# Patient Record
Sex: FEMALE | Race: WHITE | URBAN - METROPOLITAN AREA
[De-identification: names, ages, dates, MRNs, and addresses within clinical notes are randomized per-mention and may not be internally consistent; named-entity substitution may affect disease eponyms.]

---

## 2017-07-20 ENCOUNTER — EMERGENCY (EMERGENCY)
Facility: HOSPITAL | Age: 57
LOS: 1 days | End: 2017-07-20
Payer: COMMERCIAL

## 2017-07-20 PROCEDURE — 99283 EMERGENCY DEPT VISIT LOW MDM: CPT | Mod: 25

## 2021-07-07 ENCOUNTER — APPOINTMENT (OUTPATIENT)
Dept: HEART AND VASCULAR | Facility: CLINIC | Age: 61
End: 2021-07-07
Payer: COMMERCIAL

## 2021-07-07 VITALS
HEIGHT: 68 IN | SYSTOLIC BLOOD PRESSURE: 130 MMHG | WEIGHT: 287.5 LBS | HEART RATE: 80 BPM | BODY MASS INDEX: 43.57 KG/M2 | DIASTOLIC BLOOD PRESSURE: 74 MMHG

## 2021-07-07 DIAGNOSIS — Z78.9 OTHER SPECIFIED HEALTH STATUS: ICD-10-CM

## 2021-07-07 DIAGNOSIS — R00.2 PALPITATIONS: ICD-10-CM

## 2021-07-07 DIAGNOSIS — Z80.0 FAMILY HISTORY OF MALIGNANT NEOPLASM OF DIGESTIVE ORGANS: ICD-10-CM

## 2021-07-07 DIAGNOSIS — Z80.7 FAMILY HISTORY OF OTHER MALIGNANT NEOPLASMS OF LYMPHOID, HEMATOPOIETIC AND RELATED TISSUES: ICD-10-CM

## 2021-07-07 DIAGNOSIS — E78.00 PURE HYPERCHOLESTEROLEMIA, UNSPECIFIED: ICD-10-CM

## 2021-07-07 PROCEDURE — 93000 ELECTROCARDIOGRAM COMPLETE: CPT

## 2021-07-07 PROCEDURE — 99072 ADDL SUPL MATRL&STAF TM PHE: CPT

## 2021-07-07 PROCEDURE — 99204 OFFICE O/P NEW MOD 45 MIN: CPT | Mod: 25

## 2021-07-07 RX ORDER — ESOMEPRAZOLE MAGNESIUM 20 MG/1
20 CAPSULE, DELAYED RELEASE ORAL
Refills: 0 | Status: ACTIVE | COMMUNITY

## 2021-07-07 NOTE — HISTORY OF PRESENT ILLNESS
[FreeTextEntry1] : has chronic heavy legs- and developed bilateral leg discomfort- saw vascular in [place] who DX with venous Abnormaility (RLE "totally closed" and advised support stockings- has been having almost daily, short palpitations- legs get more swollen when walks a lot. gets OOB with minimal movement and bending down( chronic). . no cp,dizziness. no significant caffeine intake

## 2021-07-07 NOTE — DISCUSSION/SUMMARY
[FreeTextEntry1] : EKG:NSR\par reveiwed labs done last month: DXB=213wt%\par elevated A1c- weight loss/diet for lipids; will get echo for dyspnea and stress test( feel due to weight)- get event recorder for palpitations

## 2021-07-07 NOTE — PHYSICAL EXAM
[Well Developed] : well developed [Well Nourished] : well nourished [No Acute Distress] : no acute distress [Obese] : obese [Normal Conjunctiva] : normal conjunctiva [Normal Venous Pressure] : normal venous pressure [No Carotid Bruit] : no carotid bruit [Normal S1, S2] : normal S1, S2 [No Murmur] : no murmur [No Rub] : no rub [No Gallop] : no gallop [Clear Lung Fields] : clear lung fields [Good Air Entry] : good air entry [No Respiratory Distress] : no respiratory distress  [Soft] : abdomen soft [Non Tender] : non-tender [Normal Bowel Sounds] : normal bowel sounds [Normal Gait] : normal gait [No Varicosities] : no varicosities [No Rash] : no rash [No Skin Lesions] : no skin lesions [Moves all extremities] : moves all extremities [Normal Speech] : normal speech [Alert and Oriented] : alert and oriented [de-identified] : + LE varicosities: non pitting lE edema

## 2021-07-07 NOTE — REVIEW OF SYSTEMS
[Weight Loss (___ Lbs)] : [unfilled] ~Ulb weight loss [Tinnitus] : tinnitus [Vertigo] : vertigo [SOB] : shortness of breath [Dyspnea on exertion] : dyspnea during exertion [Chest Discomfort] : no chest discomfort [Lower Ext Edema] : lower extremity edema [Leg Claudication] : no intermittent leg claudication [Palpitations] : palpitations [Orthopnea] : no orthopnea [PND] : no PND [Syncope] : no syncope [Cough] : cough [Wheezing] : no wheezing [Coughing Up Blood] : no hemoptysis [Snoring] : no snoring [Abdominal Pain] : no abdominal pain [Nausea] : no nausea [Vomiting] : no vomiting [Heartburn] : heartburn [Change in Appetite] : no change in appetite [Change In The Stool] : no change in stool [Diarrhea] : diarrhea [Constipation] : no constipation [Blood in Stool] : no blood in stool [Dysuria] : no dysuria [Joint Pain] : joint pain [Myalgia] : no myalgia [Knee Pain] : knee pain [Ankle Pain] : ankle pain [Dizziness] : dizziness [Memory Lapses Or Loss] : memory lapses or loss [Depression] : no depression [Anxiety] : anxiety [Under Stress] : under stress [Negative] : Heme/Lymph [FreeTextEntry4] : positional [FreeTextEntry2] : positional vertigo

## 2021-07-30 ENCOUNTER — APPOINTMENT (OUTPATIENT)
Dept: HEART AND VASCULAR | Facility: CLINIC | Age: 61
End: 2021-07-30
Payer: COMMERCIAL

## 2021-07-30 DIAGNOSIS — I51.7 CARDIOMEGALY: ICD-10-CM

## 2021-07-30 PROCEDURE — 93306 TTE W/DOPPLER COMPLETE: CPT

## 2023-01-19 ENCOUNTER — APPOINTMENT (RX ONLY)
Dept: URBAN - METROPOLITAN AREA CLINIC 146 | Facility: CLINIC | Age: 63
Setting detail: DERMATOLOGY
End: 2023-01-19

## 2023-01-19 DIAGNOSIS — L28.1 PRURIGO NODULARIS: ICD-10-CM

## 2023-01-19 DIAGNOSIS — L29.89 OTHER PRURITUS: ICD-10-CM | Status: INADEQUATELY CONTROLLED

## 2023-01-19 PROBLEM — L29.8 OTHER PRURITUS: Status: ACTIVE | Noted: 2023-01-19

## 2023-01-19 PROCEDURE — ? PRESCRIPTION

## 2023-01-19 PROCEDURE — 99204 OFFICE O/P NEW MOD 45 MIN: CPT

## 2023-01-19 PROCEDURE — ? COUNSELING

## 2023-01-19 PROCEDURE — ? PRESCRIPTION MEDICATION MANAGEMENT

## 2023-01-19 RX ORDER — GABAPENTIN 100 MG/1
CAPSULE ORAL
Qty: 60 | Refills: 5 | Status: ERX | COMMUNITY
Start: 2023-01-19

## 2023-01-19 RX ADMIN — GABAPENTIN: 100 CAPSULE ORAL at 00:00

## 2023-01-19 ASSESSMENT — LOCATION DETAILED DESCRIPTION DERM
LOCATION DETAILED: RIGHT ANTERIOR PROXIMAL UPPER ARM
LOCATION DETAILED: RIGHT VENTRAL DISTAL FOREARM
LOCATION DETAILED: LEFT VENTRAL DISTAL FOREARM
LOCATION DETAILED: LEFT VENTRAL PROXIMAL FOREARM
LOCATION DETAILED: LEFT ANTERIOR PROXIMAL UPPER ARM

## 2023-01-19 ASSESSMENT — LOCATION SIMPLE DESCRIPTION DERM
LOCATION SIMPLE: RIGHT UPPER ARM
LOCATION SIMPLE: LEFT FOREARM
LOCATION SIMPLE: LEFT UPPER ARM
LOCATION SIMPLE: RIGHT FOREARM

## 2023-01-19 ASSESSMENT — LOCATION ZONE DERM: LOCATION ZONE: ARM

## 2023-08-26 ENCOUNTER — INPATIENT (INPATIENT)
Facility: HOSPITAL | Age: 63
LOS: 17 days | Discharge: ANOTHER HOSPITAL | DRG: 3 | End: 2023-09-13
Attending: INTERNAL MEDICINE | Admitting: STUDENT IN AN ORGANIZED HEALTH CARE EDUCATION/TRAINING PROGRAM
Payer: COMMERCIAL

## 2023-08-26 VITALS
OXYGEN SATURATION: 94 % | HEIGHT: 68 IN | RESPIRATION RATE: 26 BRPM | HEART RATE: 108 BPM | WEIGHT: 289.03 LBS | SYSTOLIC BLOOD PRESSURE: 156 MMHG | DIASTOLIC BLOOD PRESSURE: 88 MMHG | TEMPERATURE: 98 F

## 2023-08-26 DIAGNOSIS — R06.09 OTHER FORMS OF DYSPNEA: ICD-10-CM

## 2023-08-26 DIAGNOSIS — I48.91 UNSPECIFIED ATRIAL FIBRILLATION: ICD-10-CM

## 2023-08-26 DIAGNOSIS — I24.8 OTHER FORMS OF ACUTE ISCHEMIC HEART DISEASE: ICD-10-CM

## 2023-08-26 DIAGNOSIS — Z29.9 ENCOUNTER FOR PROPHYLACTIC MEASURES, UNSPECIFIED: ICD-10-CM

## 2023-08-26 DIAGNOSIS — R91.8 OTHER NONSPECIFIC ABNORMAL FINDING OF LUNG FIELD: ICD-10-CM

## 2023-08-26 DIAGNOSIS — M54.30 SCIATICA, UNSPECIFIED SIDE: ICD-10-CM

## 2023-08-26 DIAGNOSIS — R77.8 OTHER SPECIFIED ABNORMALITIES OF PLASMA PROTEINS: ICD-10-CM

## 2023-08-26 DIAGNOSIS — R74.01 ELEVATION OF LEVELS OF LIVER TRANSAMINASE LEVELS: ICD-10-CM

## 2023-08-26 LAB
ALBUMIN SERPL ELPH-MCNC: 3.4 G/DL — SIGNIFICANT CHANGE UP (ref 3.3–5)
ALP SERPL-CCNC: 68 U/L — SIGNIFICANT CHANGE UP (ref 40–120)
ALT FLD-CCNC: 71 U/L — HIGH (ref 10–45)
ANION GAP SERPL CALC-SCNC: 10 MMOL/L — SIGNIFICANT CHANGE UP (ref 5–17)
APTT BLD: 28.8 SEC — SIGNIFICANT CHANGE UP (ref 24.5–35.6)
AST SERPL-CCNC: 58 U/L — HIGH (ref 10–40)
BASE EXCESS BLDV CALC-SCNC: 2.7 MMOL/L — SIGNIFICANT CHANGE UP (ref -2–3)
BASOPHILS # BLD AUTO: 0.03 K/UL — SIGNIFICANT CHANGE UP (ref 0–0.2)
BASOPHILS NFR BLD AUTO: 0.3 % — SIGNIFICANT CHANGE UP (ref 0–2)
BILIRUB SERPL-MCNC: 1.3 MG/DL — HIGH (ref 0.2–1.2)
BUN SERPL-MCNC: 15 MG/DL — SIGNIFICANT CHANGE UP (ref 7–23)
CALCIUM SERPL-MCNC: 9.3 MG/DL — SIGNIFICANT CHANGE UP (ref 8.4–10.5)
CHLORIDE SERPL-SCNC: 100 MMOL/L — SIGNIFICANT CHANGE UP (ref 96–108)
CO2 BLDV-SCNC: 29.3 MMOL/L — HIGH (ref 22–26)
CO2 SERPL-SCNC: 25 MMOL/L — SIGNIFICANT CHANGE UP (ref 22–31)
CREAT SERPL-MCNC: 0.84 MG/DL — SIGNIFICANT CHANGE UP (ref 0.5–1.3)
D DIMER BLD IA.RAPID-MCNC: 666 NG/ML DDU — HIGH
EGFR: 78 ML/MIN/1.73M2 — SIGNIFICANT CHANGE UP
EOSINOPHIL # BLD AUTO: 0.01 K/UL — SIGNIFICANT CHANGE UP (ref 0–0.5)
EOSINOPHIL NFR BLD AUTO: 0.1 % — SIGNIFICANT CHANGE UP (ref 0–6)
GLUCOSE SERPL-MCNC: 119 MG/DL — HIGH (ref 70–99)
HCO3 BLDV-SCNC: 28 MMOL/L — SIGNIFICANT CHANGE UP (ref 22–29)
HCT VFR BLD CALC: 46.1 % — HIGH (ref 34.5–45)
HGB BLD-MCNC: 15.4 G/DL — SIGNIFICANT CHANGE UP (ref 11.5–15.5)
IMM GRANULOCYTES NFR BLD AUTO: 1.2 % — HIGH (ref 0–0.9)
INR BLD: 1.1 — SIGNIFICANT CHANGE UP (ref 0.85–1.18)
LYMPHOCYTES # BLD AUTO: 0.86 K/UL — LOW (ref 1–3.3)
LYMPHOCYTES # BLD AUTO: 8.7 % — LOW (ref 13–44)
MCHC RBC-ENTMCNC: 31.1 PG — SIGNIFICANT CHANGE UP (ref 27–34)
MCHC RBC-ENTMCNC: 33.4 GM/DL — SIGNIFICANT CHANGE UP (ref 32–36)
MCV RBC AUTO: 93.1 FL — SIGNIFICANT CHANGE UP (ref 80–100)
MONOCYTES # BLD AUTO: 0.88 K/UL — SIGNIFICANT CHANGE UP (ref 0–0.9)
MONOCYTES NFR BLD AUTO: 9 % — SIGNIFICANT CHANGE UP (ref 2–14)
NEUTROPHILS # BLD AUTO: 7.93 K/UL — HIGH (ref 1.8–7.4)
NEUTROPHILS NFR BLD AUTO: 80.7 % — HIGH (ref 43–77)
NRBC # BLD: 0 /100 WBCS — SIGNIFICANT CHANGE UP (ref 0–0)
NT-PROBNP SERPL-SCNC: 160 PG/ML — SIGNIFICANT CHANGE UP (ref 0–300)
PCO2 BLDV: 44 MMHG — HIGH (ref 39–42)
PH BLDV: 7.41 — SIGNIFICANT CHANGE UP (ref 7.32–7.43)
PLATELET # BLD AUTO: 222 K/UL — SIGNIFICANT CHANGE UP (ref 150–400)
PO2 BLDV: <33 MMHG — SIGNIFICANT CHANGE UP (ref 25–45)
POTASSIUM SERPL-MCNC: 4.2 MMOL/L — SIGNIFICANT CHANGE UP (ref 3.5–5.3)
POTASSIUM SERPL-SCNC: 4.2 MMOL/L — SIGNIFICANT CHANGE UP (ref 3.5–5.3)
PROT SERPL-MCNC: 7.6 G/DL — SIGNIFICANT CHANGE UP (ref 6–8.3)
PROTHROM AB SERPL-ACNC: 12.5 SEC — SIGNIFICANT CHANGE UP (ref 9.5–13)
RAPID RVP RESULT: SIGNIFICANT CHANGE UP
RBC # BLD: 4.95 M/UL — SIGNIFICANT CHANGE UP (ref 3.8–5.2)
RBC # FLD: 14.9 % — HIGH (ref 10.3–14.5)
SAO2 % BLDV: 47.1 % — LOW (ref 67–88)
SARS-COV-2 RNA SPEC QL NAA+PROBE: SIGNIFICANT CHANGE UP
SODIUM SERPL-SCNC: 135 MMOL/L — SIGNIFICANT CHANGE UP (ref 135–145)
TROPONIN T, HIGH SENSITIVITY RESULT: 118 NG/L — CRITICAL HIGH (ref 0–51)
TROPONIN T, HIGH SENSITIVITY RESULT: 123 NG/L — CRITICAL HIGH (ref 0–51)
WBC # BLD: 9.83 K/UL — SIGNIFICANT CHANGE UP (ref 3.8–10.5)
WBC # FLD AUTO: 9.83 K/UL — SIGNIFICANT CHANGE UP (ref 3.8–10.5)

## 2023-08-26 PROCEDURE — 71275 CT ANGIOGRAPHY CHEST: CPT | Mod: 26,MC

## 2023-08-26 PROCEDURE — 99223 1ST HOSP IP/OBS HIGH 75: CPT

## 2023-08-26 PROCEDURE — 99291 CRITICAL CARE FIRST HOUR: CPT

## 2023-08-26 RX ORDER — SODIUM CHLORIDE 9 MG/ML
1000 INJECTION, SOLUTION INTRAVENOUS
Refills: 0 | Status: DISCONTINUED | OUTPATIENT
Start: 2023-08-26 | End: 2023-09-13

## 2023-08-26 RX ORDER — GABAPENTIN 400 MG/1
100 CAPSULE ORAL THREE TIMES A DAY
Refills: 0 | Status: DISCONTINUED | OUTPATIENT
Start: 2023-08-26 | End: 2023-08-30

## 2023-08-26 RX ORDER — DEXTROSE 50 % IN WATER 50 %
25 SYRINGE (ML) INTRAVENOUS ONCE
Refills: 0 | Status: DISCONTINUED | OUTPATIENT
Start: 2023-08-26 | End: 2023-09-13

## 2023-08-26 RX ORDER — GLUCAGON INJECTION, SOLUTION 0.5 MG/.1ML
1 INJECTION, SOLUTION SUBCUTANEOUS ONCE
Refills: 0 | Status: DISCONTINUED | OUTPATIENT
Start: 2023-08-26 | End: 2023-09-13

## 2023-08-26 RX ORDER — INSULIN LISPRO 100/ML
VIAL (ML) SUBCUTANEOUS
Refills: 0 | Status: DISCONTINUED | OUTPATIENT
Start: 2023-08-26 | End: 2023-09-13

## 2023-08-26 RX ORDER — PANTOPRAZOLE SODIUM 20 MG/1
40 TABLET, DELAYED RELEASE ORAL
Refills: 0 | Status: DISCONTINUED | OUTPATIENT
Start: 2023-08-26 | End: 2023-09-13

## 2023-08-26 RX ORDER — DEXTROSE 50 % IN WATER 50 %
15 SYRINGE (ML) INTRAVENOUS ONCE
Refills: 0 | Status: DISCONTINUED | OUTPATIENT
Start: 2023-08-26 | End: 2023-09-13

## 2023-08-26 RX ORDER — ACETAMINOPHEN 500 MG
650 TABLET ORAL EVERY 6 HOURS
Refills: 0 | Status: DISCONTINUED | OUTPATIENT
Start: 2023-08-26 | End: 2023-09-13

## 2023-08-26 RX ORDER — ENOXAPARIN SODIUM 100 MG/ML
40 INJECTION SUBCUTANEOUS EVERY 12 HOURS
Refills: 0 | Status: DISCONTINUED | OUTPATIENT
Start: 2023-08-27 | End: 2023-09-13

## 2023-08-26 RX ORDER — DEXTROSE 50 % IN WATER 50 %
12.5 SYRINGE (ML) INTRAVENOUS ONCE
Refills: 0 | Status: DISCONTINUED | OUTPATIENT
Start: 2023-08-26 | End: 2023-09-13

## 2023-08-26 RX ADMIN — GABAPENTIN 100 MILLIGRAM(S): 400 CAPSULE ORAL at 22:32

## 2023-08-26 NOTE — ED ADULT NURSE NOTE - NSFALLUNIVINTERV_ED_ALL_ED
Bed/Stretcher in lowest position, wheels locked, appropriate side rails in place/Call bell, personal items and telephone in reach/Instruct patient to call for assistance before getting out of bed/chair/stretcher/Non-slip footwear applied when patient is off stretcher/[place] to call system/Physically safe environment - no spills, clutter or unnecessary equipment/Purposeful proactive rounding/Room/bathroom lighting operational, light cord in reach

## 2023-08-26 NOTE — H&P ADULT - PROBLEM SELECTOR PLAN 3
Trops downtrending    - f/u cardiology Trops downtrending. Troponin may be elevated in the setting of demand ischemia. Pt has history of chronic SOB. BNP wnl    - f/u cardiology rec  - continue to monitor symptoms  - f/u tte

## 2023-08-26 NOTE — H&P ADULT - ATTENDING COMMENTS
63 yo F with no significant PMHx px from outpatient urgent care with 1d hx of shortness of breath found to have acute hypoxic respiratory failure with CTPE evidence of interstitial lung disease admitted for further work-up.      #Acute hypoxic respiratory failure – Pt reporting hx of shortness of breath over several months, worsening in last few weeks found to be hypoxic to low 80s with ambulation. Labs largely within normal, no significant peripheral eosinophilia noted. CTPE in ED negative for PE but revealing bilateral interstitial lung disease 2/2 HP vs NSIP vs atypical PNA. Of note, pt recently on prednisone short-course for alternative reason (pinched nerve) and reportedly had work-up outpatient to R/O autoimmune/rheumatological etiology after being told about GGO on outpatient CT scan prior. 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 On current admission, AOx3, tachycardic on exam, lungs with fine crackles at the base, R>L, mildly tachypneic with speaking full sentences, satting well on 2L NC. Outpatient collateral from PCP. Pulm consult. Wean O2 as tolerated. IS. Ambulatory O2 saturation. F/U autoimmune/rheum work-up with AM labs. Favor starting prednisone 40mg PO Q24 for suspected ILD.      Agree with remainder of resident plan as above.

## 2023-08-26 NOTE — ED PROVIDER NOTE - OBJECTIVE STATEMENT
61 y/o F recently travelled to [place] by car 2 weeks ago with b/l ground glass opacities from unknown cause presents to ED c/o SOB. Pt has had chronic SOB for several months and got worked up in [place] where she had a CAT scan negative for PE. Pt was seen by pulmonology and rheumatology to r/o lupus and other immunological disorders. Prior to arrival pt went to urgent care and had an XR done which was concerning for b/l lower infiltrates. Pt also has chronic leg swelling and cough, but denies fever, chills.

## 2023-08-26 NOTE — H&P ADULT - ASSESSMENT
62 year old female with a past medical history of afib, and sciatica, who presented to [facility] for shortness of breath found to have tropinemia and admitted for shortness of breath. RUPERT:'[id number]' TOKEN:'[id number]' 62 year old female with a past medical history of afib, and sciatica, who presented to [facility] for shortness of breath found to have tropinemia and interstitial lung disease admitted for hypoxia.

## 2023-08-26 NOTE — H&P ADULT - PROBLEM SELECTOR PLAN 7
F: none given  E: If k>4 or mag>2 replete prn  N: dash diet  G: none  DVT: lovenox  Dispo; 7uris Home meds prednisone 20mg BID, gabapentin 100g TID.    - c/w home meds

## 2023-08-26 NOTE — H&P ADULT - PROBLEM SELECTOR PLAN 4
CXR revealed ground glass opacity    - continue to monitor sxs CXR revealed ground glass opacity. CTA revealed interstitial lung disease.    - continue to monitor sxs  - f/u procal

## 2023-08-26 NOTE — H&P ADULT - NSHPPHYSICALEXAM_GEN_ALL_CORE
T(C): 36.4 (08-26-23 @ 20:00), Max: 36.9 (08-26-23 @ 17:47)  HR: 97 (08-26-23 @ 20:00) (96 - 108)  BP: 113/76 (08-26-23 @ 20:00) (113/76 - 156/88)  RR: 18 (08-26-23 @ 20:00) (18 - 22)  SpO2: 94% (08-26-23 @ 20:00) (94% - 98%)    General: NAD, laying in bed, speaking in full sentences  HEENT: head NC/AT, no conjunctival injection, EOMI, MMM  Neck: supple  Cardio: RRR, +S1/S2, no M/R/G  Resp: lungs CTAB, no cough/wheezes/rales/rhonchi  Abdo: obese abdomen, nontender nondistended  Extremities: WWP, no edema/cyanosis/clubbing   Vasc: 2+ radial and DP pulses b/l  Neuro: A&Ox3  Psych: speech non-pressured, thoughts goal-oriented  Skin: lymphedema  MSK: no joint swelling T(C): 36.4 (08-26-23 @ 20:00), Max: 36.9 (08-26-23 @ 17:47)  HR: 97 (08-26-23 @ 20:00) (96 - 108)  BP: 113/76 (08-26-23 @ 20:00) (113/76 - 156/88)  RR: 18 (08-26-23 @ 20:00) (18 - 22)  SpO2: 94% (08-26-23 @ 20:00) (94% - 98%)    General: NAD, laying in bed, speaking in full sentences  HEENT: head NC/AT, no conjunctival injection, EOMI, MMM  Neck: supple  Cardio: RRR, +S1/S2, no M/R/G  Resp: lungs CTAB, no cough/wheezes/rales/rhonchi  Abdo: obese abdomen, nontender nondistended  Extremities: WWP. no pitting edema, lymphedema  Vasc: 2+ radial and DP pulses b/l  Neuro: A&Ox3  Psych: speech non-pressured, thoughts goal-oriented  Skin: no rash  MSK: no joint swelling

## 2023-08-26 NOTE — H&P ADULT - PROBLEM SELECTOR PLAN 6
Pt states she had afib 1 month ago. Has not been taking medications    - not currently in afib  - consider lopressor 12.5 Pt states she had afib 1 month ago. Has not been taking medications.    - not currently in afib  - consider lopressor 12.5

## 2023-08-26 NOTE — H&P ADULT - HISTORY OF PRESENT ILLNESS
This is a 62 year old female with a past medical history of afib, and sciatica, who presented to [facility] for shortness of breath. Pt states that she went to the urgent care where she had an XR done concerning for b/l lower infiltrates, and was driven here to the ED by car from the ambulance. This is a 62 year old female with a past medical history of afib, and sciatica, who presented to [facility] for shortness of breath. Pt states that she went to the urgent care today where she had an XR done concerning for b/l lower infiltrates, and was driven here to the ED by car from the ambulance. She states that she can only walk 12-15 feet and feels short of breath. She states that she has a pulse ox at home and had readings of 82. She states that she had chronic SOB for several months and got worked up in [place] where she had a CT scan that was negative for PE. She also states that she was seen by a pulmonologist and rheumatology, where they ruled out lupus and other immunological disorders. Pt denies any uri sxs, chest pain, pain with breathing, abdominal pain, nvd, headaches,     ED Course:  Vitals: 97.6, 97, 113/76, 18 94% 2L nc  Labs: Wbc 9.83 Hb 15.4, Hct 46.1, Plt 222, D-dimer 666, Trop T 123, VBG 7.41 CO2 29.3, RVP neg, Cov2 neg,  Imaging: CTA: no visualized PE  EKhr sinus rhythm  Consult: Cardiology This is a 62 year old female with a past medical history of afib, and sciatica, who presented to [facility] for shortness of breath. Pt states that she went to the urgent care today where she had an XR done concerning for b/l lower infiltrates, and was driven here to the ED by car from the ambulance. She states that she can only walk 12-15 feet and feels short of breath. She states that she has a pulse ox at home and had readings of 82. She states that she had chronic SOB for several months and got worked up in [place] where she had a CT scan that was negative for PE. She also states that she was seen by a pulmonologist and rheumatology, where they ruled out lupus and other immunological disorders. Pt states that she has a cough with no sputum. Pt denies any uri sxs, chest pain, pain with breathing, abdominal pain, nvd, headaches,     ED Course:  Vitals: 97.6, 97, 113/76, 18 94% 2L nc  Labs: Wbc 9.83 Hb 15.4, Hct 46.1, Plt 222, D-dimer 666, Trop T 123, VBG 7.41 CO2 29.3, RVP neg, Cov2 neg,  Imaging: CTA: no visualized PE  EKhr sinus rhythm  Consult: Cardiology

## 2023-08-26 NOTE — H&P ADULT - PROBLEM SELECTOR PLAN 5
AST 58, ALT 71    - trend  - hep panel AST 58, ALT 71. May be elevated in the setting of decreased perfusion. Cr wnl    - trend cmp daily  - hep panel

## 2023-08-26 NOTE — H&P ADULT - PROBLEM SELECTOR PLAN 2
on 2l nc  CXR: pulmonary infiltrates    - f/u pulm consult No acs sxs. Elevated troponin, downtrending. In the setting of chronic sob and dyspnea on exertion. BNP wnl    - f/u cardiology consult  - f/u a1c, lipid profile,  - f/u tte

## 2023-08-26 NOTE — ED ADULT TRIAGE NOTE - CHIEF COMPLAINT QUOTE
Pt presents to ED here for sob. Pt A&Ox4. Pt is conversive in full sentences pt noted to have labored breathing. Pt reports have been taking prednisone for her pinch nerve. No known PMHx. Denies cp, abd pain, HA or dizziness. EKG in prog. Pt presents to ED here for sob. Pt A&Ox4. Pt is conversive in full sentences. Pt reports have been taking prednisone for her pinch nerve. No known PMHx. Denies cp, abd pain, HA or dizziness. EKG in prog. Pt presents to ED here for sob. Pt A&Ox4. Pt is conversive in full sentences. Pt reports have been taking prednisone for her pinch nerve. No known PMHx. Denies any cardiac hx or lung hx, cp, abd pain, HA or dizziness. EKG in prog.

## 2023-08-26 NOTE — ED ADULT NURSE NOTE - CHIEF COMPLAINT QUOTE
Pt presents to ED here for sob. Pt A&Ox4. Pt is conversive in full sentences. Pt reports have been taking prednisone for her pinch nerve. No known PMHx. Denies any cardiac hx or lung hx, cp, abd pain, HA or dizziness. EKG in prog.

## 2023-08-26 NOTE — ED PROVIDER NOTE - CLINICAL SUMMARY MEDICAL DECISION MAKING FREE TEXT BOX
61 y/o F presents to ED c/o SOB which has been ongoing xseveral months but now hypoxic on RA requiring 2L NC to maintain O2 over 94%. Check labs, D-dimer, CTA r/o PE. Will likely admit for hypoxia. 61 y/o F presents to ED c/o SOB which has been ongoing xseveral months but now hypoxic on RA requiring 2L NC to maintain O2 over 94%. Check labs, D-dimer, CTA r/o PE. Will likely admit for hypoxia.    Workup shows trop 126, then 118.  EKG with q waves III and avF.  CTA no PE - but findings c/w ILD.  Discussed case with cards who feels pt requires med admission for pulm workup and cards to consult on pt.  Pulm fellow aware of consult.  Pt stable for regional med admission.

## 2023-08-26 NOTE — H&P ADULT - PROBLEM SELECTOR PLAN 1
No acs sxs. Elevated troponin, downtrending.    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 RVP, cov negative. Hb and Hct elevated in setting of  chronic sob  CXR: pulmonary infiltrates  CTA: Bilateral interstitial lung disease. Differential diagnosis includes HP,NSIP, atypical pneumonia.    Possibly idiopathic pulmonary fibrosis with chronic duration given CTA findings and CXR. 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 Symptoms have not improved with prednisone 20mg BID, may be less likely ILD secondary to rheumatological causes. Less likely interstitial pneumonia given asymptomatic and wbc wnl. Physical exam not notable for connective tissue diseases.     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 RVP, cov negative. Hb and Hct elevated in setting of  chronic sob  CXR: pulmonary infiltrates  CTA: Bilateral interstitial lung disease. Differential diagnosis includes HP,NSIP, atypical pneumonia.    Possibly idiopathic pulmonary fibrosis with chronic duration given CTA findings and CXR. 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 Symptoms have not improved with prednisone 20mg BID, may be less likely ILD secondary to rheumatological causes. Less likely interstitial pneumonia given asymptomatic and wbc wnl. Physical exam not notable for connective tissue diseases.     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 RVP, cov negative. Hb and Hct elevated in setting of  chronic sob  CXR: pulmonary infiltrates  CTA: Bilateral interstitial lung disease. Differential diagnosis includes HP,NSIP, atypical pneumonia.    Possibly idiopathic pulmonary fibrosis with chronic duration given CTA findings and CXR. 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 Symptoms have not improved with prednisone 20mg BID, may be less likely ILD secondary to rheumatological causes. Less likely interstitial pneumonia given asymptomatic and wbc wnl. Physical exam not notable for connective tissue diseases.     - f/u pulm consult  - prednisone 40mg qD  - consider ct chest  - incentive spirometer  - f/u legionella and strep  - f/u procal  - wean o2

## 2023-08-26 NOTE — H&P ADULT - PROBLEM SELECTOR PLAN 8
F: none given  E: If k>4 or mag>2 replete prn  N: dash diet  G: none  DVT: lovenox  Dispo; 7uris F: none given  E: If k>4 or mag>2 replete prn  N: dash diet  G: protonix  DVT: lovenox  Dispo; 7uris

## 2023-08-26 NOTE — H&P ADULT - NSHPSOCIALHISTORY_GEN_ALL_CORE
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

## 2023-08-26 NOTE — ED ADULT NURSE NOTE - OBJECTIVE STATEMENT
Patient to ED c/o shortness of breath "for awhile", worsening x 2 days. Reports she has been checking O2 @ home and has been mid 80's the past two days. Denies cardiac/respiratory hx. Currently taking prednisone r/t back injury. 89/90% on RA upon ED arrival, placed on 2 L O2. Speaking in complete sentences. AAOX4, NAD.

## 2023-08-27 DIAGNOSIS — J96.01 ACUTE RESPIRATORY FAILURE WITH HYPOXIA: ICD-10-CM

## 2023-08-27 LAB
A1C WITH ESTIMATED AVERAGE GLUCOSE RESULT: 6.1 % — HIGH (ref 4–5.6)
ALBUMIN SERPL ELPH-MCNC: 2.9 G/DL — LOW (ref 3.3–5)
ALP SERPL-CCNC: 55 U/L — SIGNIFICANT CHANGE UP (ref 40–120)
ALT FLD-CCNC: 50 U/L — HIGH (ref 10–45)
ANION GAP SERPL CALC-SCNC: 8 MMOL/L — SIGNIFICANT CHANGE UP (ref 5–17)
AST SERPL-CCNC: 47 U/L — HIGH (ref 10–40)
BASOPHILS # BLD AUTO: 0.03 K/UL — SIGNIFICANT CHANGE UP (ref 0–0.2)
BASOPHILS NFR BLD AUTO: 0.4 % — SIGNIFICANT CHANGE UP (ref 0–2)
BILIRUB SERPL-MCNC: 1.1 MG/DL — SIGNIFICANT CHANGE UP (ref 0.2–1.2)
BUN SERPL-MCNC: 14 MG/DL — SIGNIFICANT CHANGE UP (ref 7–23)
CALCIUM SERPL-MCNC: 8.8 MG/DL — SIGNIFICANT CHANGE UP (ref 8.4–10.5)
CHLORIDE SERPL-SCNC: 104 MMOL/L — SIGNIFICANT CHANGE UP (ref 96–108)
CHOLEST SERPL-MCNC: 152 MG/DL — SIGNIFICANT CHANGE UP
CO2 SERPL-SCNC: 23 MMOL/L — SIGNIFICANT CHANGE UP (ref 22–31)
CREAT SERPL-MCNC: 0.79 MG/DL — SIGNIFICANT CHANGE UP (ref 0.5–1.3)
EGFR: 83 ML/MIN/1.73M2 — SIGNIFICANT CHANGE UP
EOSINOPHIL # BLD AUTO: 0.06 K/UL — SIGNIFICANT CHANGE UP (ref 0–0.5)
EOSINOPHIL NFR BLD AUTO: 0.8 % — SIGNIFICANT CHANGE UP (ref 0–6)
ESTIMATED AVERAGE GLUCOSE: 128 MG/DL — HIGH (ref 68–114)
GLUCOSE BLDC GLUCOMTR-MCNC: 104 MG/DL — HIGH (ref 70–99)
GLUCOSE BLDC GLUCOMTR-MCNC: 135 MG/DL — HIGH (ref 70–99)
GLUCOSE BLDC GLUCOMTR-MCNC: 168 MG/DL — HIGH (ref 70–99)
GLUCOSE SERPL-MCNC: 105 MG/DL — HIGH (ref 70–99)
HAV IGM SER-ACNC: SIGNIFICANT CHANGE UP
HBV CORE AB SER-ACNC: SIGNIFICANT CHANGE UP
HBV CORE IGM SER-ACNC: SIGNIFICANT CHANGE UP
HBV SURFACE AB SER-ACNC: SIGNIFICANT CHANGE UP
HBV SURFACE AG SER-ACNC: SIGNIFICANT CHANGE UP
HCT VFR BLD CALC: 42.8 % — SIGNIFICANT CHANGE UP (ref 34.5–45)
HCV AB S/CO SERPL IA: 0.04 S/CO — SIGNIFICANT CHANGE UP
HCV AB S/CO SERPL IA: 0.04 S/CO — SIGNIFICANT CHANGE UP
HCV AB SERPL-IMP: SIGNIFICANT CHANGE UP
HCV AB SERPL-IMP: SIGNIFICANT CHANGE UP
HDLC SERPL-MCNC: 32 MG/DL — LOW
HGB BLD-MCNC: 13.7 G/DL — SIGNIFICANT CHANGE UP (ref 11.5–15.5)
IMM GRANULOCYTES NFR BLD AUTO: 1.1 % — HIGH (ref 0–0.9)
LIPID PNL WITH DIRECT LDL SERPL: 99 MG/DL — SIGNIFICANT CHANGE UP
LYMPHOCYTES # BLD AUTO: 0.73 K/UL — LOW (ref 1–3.3)
LYMPHOCYTES # BLD AUTO: 10.1 % — LOW (ref 13–44)
MAGNESIUM SERPL-MCNC: 2 MG/DL — SIGNIFICANT CHANGE UP (ref 1.6–2.6)
MCHC RBC-ENTMCNC: 30.5 PG — SIGNIFICANT CHANGE UP (ref 27–34)
MCHC RBC-ENTMCNC: 32 GM/DL — SIGNIFICANT CHANGE UP (ref 32–36)
MCV RBC AUTO: 95.3 FL — SIGNIFICANT CHANGE UP (ref 80–100)
MONOCYTES # BLD AUTO: 0.71 K/UL — SIGNIFICANT CHANGE UP (ref 0–0.9)
MONOCYTES NFR BLD AUTO: 9.8 % — SIGNIFICANT CHANGE UP (ref 2–14)
NEUTROPHILS # BLD AUTO: 5.65 K/UL — SIGNIFICANT CHANGE UP (ref 1.8–7.4)
NEUTROPHILS NFR BLD AUTO: 77.8 % — HIGH (ref 43–77)
NON HDL CHOLESTEROL: 120 MG/DL — SIGNIFICANT CHANGE UP
NRBC # BLD: 0 /100 WBCS — SIGNIFICANT CHANGE UP (ref 0–0)
PHOSPHATE SERPL-MCNC: 4 MG/DL — SIGNIFICANT CHANGE UP (ref 2.5–4.5)
PLATELET # BLD AUTO: 160 K/UL — SIGNIFICANT CHANGE UP (ref 150–400)
POTASSIUM SERPL-MCNC: 4.2 MMOL/L — SIGNIFICANT CHANGE UP (ref 3.5–5.3)
POTASSIUM SERPL-SCNC: 4.2 MMOL/L — SIGNIFICANT CHANGE UP (ref 3.5–5.3)
PROCALCITONIN SERPL-MCNC: 0.09 NG/ML — SIGNIFICANT CHANGE UP (ref 0.02–0.1)
PROT SERPL-MCNC: 6.3 G/DL — SIGNIFICANT CHANGE UP (ref 6–8.3)
RBC # BLD: 4.49 M/UL — SIGNIFICANT CHANGE UP (ref 3.8–5.2)
RBC # FLD: 15 % — HIGH (ref 10.3–14.5)
RHEUMATOID FACT SERPL-ACNC: <10 IU/ML — SIGNIFICANT CHANGE UP (ref 0–13)
SODIUM SERPL-SCNC: 135 MMOL/L — SIGNIFICANT CHANGE UP (ref 135–145)
TRIGL SERPL-MCNC: 104 MG/DL — SIGNIFICANT CHANGE UP
TSH SERPL-MCNC: 2.36 UIU/ML — SIGNIFICANT CHANGE UP (ref 0.27–4.2)
WBC # BLD: 7.26 K/UL — SIGNIFICANT CHANGE UP (ref 3.8–10.5)
WBC # FLD AUTO: 7.26 K/UL — SIGNIFICANT CHANGE UP (ref 3.8–10.5)

## 2023-08-27 PROCEDURE — 99223 1ST HOSP IP/OBS HIGH 75: CPT | Mod: GC

## 2023-08-27 PROCEDURE — 93010 ELECTROCARDIOGRAM REPORT: CPT

## 2023-08-27 PROCEDURE — 99221 1ST HOSP IP/OBS SF/LOW 40: CPT

## 2023-08-27 PROCEDURE — 99233 SBSQ HOSP IP/OBS HIGH 50: CPT | Mod: GC

## 2023-08-27 RX ADMIN — GABAPENTIN 100 MILLIGRAM(S): 400 CAPSULE ORAL at 06:25

## 2023-08-27 RX ADMIN — Medication 40 MILLIGRAM(S): at 06:25

## 2023-08-27 RX ADMIN — ENOXAPARIN SODIUM 40 MILLIGRAM(S): 100 INJECTION SUBCUTANEOUS at 17:57

## 2023-08-27 RX ADMIN — PANTOPRAZOLE SODIUM 40 MILLIGRAM(S): 20 TABLET, DELAYED RELEASE ORAL at 06:25

## 2023-08-27 RX ADMIN — ENOXAPARIN SODIUM 40 MILLIGRAM(S): 100 INJECTION SUBCUTANEOUS at 06:25

## 2023-08-27 RX ADMIN — GABAPENTIN 100 MILLIGRAM(S): 400 CAPSULE ORAL at 14:11

## 2023-08-27 RX ADMIN — GABAPENTIN 100 MILLIGRAM(S): 400 CAPSULE ORAL at 21:29

## 2023-08-27 RX ADMIN — Medication 1: at 09:58

## 2023-08-27 NOTE — CONSULT NOTE ADULT - SUBJECTIVE AND OBJECTIVE BOX
HPI  HPI:  This is a 62 F afib, and sciatica who presented to [facility] for shortness of breath. Can only walk 12-15 feet and feels short of breath. She states that she has a pulse ox at home and had readings of 82. She states that she had chronic SOB for several months and got worked up in [place] where she had a CT scan that was negative for PE. She also states that she was seen by a pulmonologist and rheumatology, where they ruled out lupus and other immunological disorders. Pt states that she has a cough with no sputum. Pt denies any uri sxs, chest pain, pain with breathing, abdominal pain, nvd, headaches,     Denies CP, palpitations, presyncope, syncope    ROS: A 10-point review of systems was otherwise negative.    PAST MEDICAL & SURGICAL HISTORY:      SOCIAL HISTORY: non smoker  FAMILY HISTORY:    No hx of premature ASCVD in first degree relative, no sudden cardiac death in first degree relatives.       ALLERGIES: 	  No Known Allergies            MEDICATIONS:  acetaminophen     Tablet .. 650 milliGRAM(s) Oral every 6 hours PRN  dextrose 5%. 1000 milliLiter(s) IV Continuous <Continuous>  dextrose 5%. 1000 milliLiter(s) IV Continuous <Continuous>  dextrose 50% Injectable 25 Gram(s) IV Push once  dextrose 50% Injectable 12.5 Gram(s) IV Push once  dextrose 50% Injectable 25 Gram(s) IV Push once  dextrose Oral Gel 15 Gram(s) Oral once PRN  enoxaparin Injectable 40 milliGRAM(s) SubCutaneous every 12 hours  gabapentin 100 milliGRAM(s) Oral three times a day  glucagon  Injectable 1 milliGRAM(s) IntraMuscular once  insulin lispro (ADMELOG) corrective regimen sliding scale   SubCutaneous three times a day before meals  pantoprazole    Tablet 40 milliGRAM(s) Oral before breakfast  predniSONE   Tablet 40 milliGRAM(s) Oral daily      PHYSICAL EXAM:  T(C): 37 (08-27-23 @ 05:41), Max: 37.6 (08-26-23 @ 20:40)  HR: 89 (08-27-23 @ 05:41) (89 - 108)  BP: 115/69 (08-27-23 @ 05:41) (113/76 - 156/88)  RR: 18 (08-27-23 @ 11:00) (18 - 22)  SpO2: 91% (08-27-23 @ 11:00) (88% - 98%)  Wt(kg): --    GEN:  NAD.   HEENT: JVP 5  cmH2O  RESP: Chest clear bilaterally  CV: S1+s2. No murmur  ABD: Soft and non tender  EXT: Warm and well perfused. No edema    LABS:                        13.7   7.26  )-----------( 160      ( 27 Aug 2023 05:30 )             42.8     08-27    135  |  104  |  14  ----------------------------<  105<H>  4.2   |  23  |  0.79    Ca    8.8      27 Aug 2023 05:30  Phos  4.0     08-27  Mg     2.0     08-27    TPro  6.3  /  Alb  2.9<L>  /  TBili  1.1  /  DBili  x   /  AST  47<H>  /  ALT  50<H>  /  AlkPhos  55  08-27      Radiographic Imaging, ECG, echocardiography personally reviewed.   HPI  HPI:  This is a 62 F afib, and sciatica who presented to [facility] for shortness of breath. Can only walk 12-15 feet and feels short of breath. She states that she has a pulse ox at home and had readings of 82. She states that she had chronic SOB for several months and got worked up in [place] where she had a CT scan that was negative for PE. She also states that she was seen by a pulmonologist and rheumatology, where they ruled out lupus and other immunological disorders. Pt states that she has a cough with no sputum. Pt denies any uri sxs, chest pain, pain with breathing, abdominal pain, nvd, headaches,     Denies CP, palpitations, presyncope, syncope    ROS: A 10-point review of systems was otherwise negative.    PAST MEDICAL & SURGICAL HISTORY:      SOCIAL HISTORY: non smoker  FAMILY HISTORY:    father w possible premature angina      ALLERGIES: 	  No Known Allergies            MEDICATIONS:  acetaminophen     Tablet .. 650 milliGRAM(s) Oral every 6 hours PRN  dextrose 5%. 1000 milliLiter(s) IV Continuous <Continuous>  dextrose 5%. 1000 milliLiter(s) IV Continuous <Continuous>  dextrose 50% Injectable 25 Gram(s) IV Push once  dextrose 50% Injectable 12.5 Gram(s) IV Push once  dextrose 50% Injectable 25 Gram(s) IV Push once  dextrose Oral Gel 15 Gram(s) Oral once PRN  enoxaparin Injectable 40 milliGRAM(s) SubCutaneous every 12 hours  gabapentin 100 milliGRAM(s) Oral three times a day  glucagon  Injectable 1 milliGRAM(s) IntraMuscular once  insulin lispro (ADMELOG) corrective regimen sliding scale   SubCutaneous three times a day before meals  pantoprazole    Tablet 40 milliGRAM(s) Oral before breakfast  predniSONE   Tablet 40 milliGRAM(s) Oral daily      PHYSICAL EXAM:  T(C): 37 (08-27-23 @ 05:41), Max: 37.6 (08-26-23 @ 20:40)  HR: 89 (08-27-23 @ 05:41) (89 - 108)  BP: 115/69 (08-27-23 @ 05:41) (113/76 - 156/88)  RR: 18 (08-27-23 @ 11:00) (18 - 22)  SpO2: 91% (08-27-23 @ 11:00) (88% - 98%)  Wt(kg): --    GEN:  NAD.   HEENT: JVP 5  cmH2O  RESP: Chest clear bilaterally  CV: S1+s2. No murmur  ABD: Soft and non tender  EXT: Warm and well perfused. No edema.    LABS:                        13.7   7.26  )-----------( 160      ( 27 Aug 2023 05:30 )             42.8     08-27    135  |  104  |  14  ----------------------------<  105<H>  4.2   |  23  |  0.79    Ca    8.8      27 Aug 2023 05:30  Phos  4.0     08-27  Mg     2.0     08-27    TPro  6.3  /  Alb  2.9<L>  /  TBili  1.1  /  DBili  x   /  AST  47<H>  /  ALT  50<H>  /  AlkPhos  55  08-27      Radiographic Imaging, ECG, echocardiography personally reviewed.

## 2023-08-27 NOTE — CONSULT NOTE ADULT - SUBJECTIVE AND OBJECTIVE BOX
PULMONARY SERVICE INITIAL CONSULT NOTE    HPI:  This is a 62 year old female with a past medical history of afib, and sciatica, who presented to [facility] for shortness of breath. Pt states that she went to the urgent care today where she had an XR done concerning for b/l lower infiltrates, and was driven here to the ED by car from the ambulance. She states that she can only walk 12-15 feet and feels short of breath. She states that she has a pulse ox at home and had readings of 82. She states that she had chronic SOB for several months and got worked up in [place] where she had a CT scan that was negative for PE. She also states that she was seen by a pulmonologist and rheumatology, where they ruled out lupus and other immunological disorders. Pt states that she has a cough with no sputum. Pt denies any uri sxs, chest pain, pain with breathing, abdominal pain, nvd, headaches,     ED Course:  Vitals: 97.6, 97, 113/76, 18 94% 2L nc  Labs: Wbc 9.83 Hb 15.4, Hct 46.1, Plt 222, D-dimer 666, Trop T 123, VBG 7.41 CO2 29.3, RVP neg, Cov2 neg,  Imaging: CTA: no visualized PE  EKhr sinus rhythm  Consult: Cardiology (26 Aug 2023 18:38)    Additional Pulmonary History:     REVIEW OF SYSTEMS:  Constitutional: No fever, weight loss or fatigue  Eyes: No eye pain, visual disturbances, or discharge  ENMT:  No difficulty hearing, tinnitus, vertigo; No sinus or throat pain  Neck: No pain, stiffness or neck swelling  Respiratory: see HPI  Cardiovascular: No chest pain, palpitations, dizziness or leg swelling  Gastrointestinal: No abdominal or epigastric pain. No nausea, vomiting or hematemesis; No diarrhea or constipation. No melena or hematochezia.  Genitourinary: No dysuria, frequency, hematuria or incontinence  Neurological: No headaches, memory loss, loss of strength, numbness or tremors  Skin: No itching, burning, rashes or lesions   Lymph Nodes: No enlarged glands  Endocrine: No heat or cold intolerance; No hair loss  Musculoskeletal: No joint pain or swelling; No muscle, back or extremity pain  Psychiatric: No depression, anxiety, mood swings or difficulty sleeping  Heme/Lymph: No easy bruising or bleeding gums  Allergy and Immunologic: No hives or eczema    PAST MEDICAL & SURGICAL HISTORY:      FAMILY HISTORY:      SOCIAL HISTORY:  Smoking Status: [ ] Current, [ ] Former, [ ] Never  Pack Years:    MEDICATIONS:  Pulmonary:    Antimicrobials:    Anticoagulants:  enoxaparin Injectable 40 milliGRAM(s) SubCutaneous every 12 hours    Onc:    GI/:  pantoprazole    Tablet 40 milliGRAM(s) Oral before breakfast    Endocrine:  dextrose 50% Injectable 25 Gram(s) IV Push once  dextrose 50% Injectable 12.5 Gram(s) IV Push once  dextrose 50% Injectable 25 Gram(s) IV Push once  dextrose Oral Gel 15 Gram(s) Oral once PRN  glucagon  Injectable 1 milliGRAM(s) IntraMuscular once  insulin lispro (ADMELOG) corrective regimen sliding scale   SubCutaneous three times a day before meals  predniSONE   Tablet 40 milliGRAM(s) Oral daily    Cardiac:    Other Medications:  acetaminophen     Tablet .. 650 milliGRAM(s) Oral every 6 hours PRN  dextrose 5%. 1000 milliLiter(s) IV Continuous <Continuous>  dextrose 5%. 1000 milliLiter(s) IV Continuous <Continuous>  gabapentin 100 milliGRAM(s) Oral three times a day      Allergies    No Known Allergies    Intolerances        Vital Signs Last 24 Hrs  T(C): 37 (27 Aug 2023 05:41), Max: 37.6 (26 Aug 2023 20:40)  T(F): 98.6 (27 Aug 2023 05:41), Max: 99.6 (26 Aug 2023 20:40)  HR: 89 (27 Aug 2023 05:41) (89 - 108)  BP: 115/69 (27 Aug 2023 05:41) (113/76 - 156/88)  BP(mean): 85 (26 Aug 2023 20:40) (85 - 85)  RR: 18 (27 Aug 2023 11:00) (18 - 22)  SpO2: 91% (27 Aug 2023 11:00) (88% - 98%)    Parameters below as of 27 Aug 2023 11:00  Patient On (Oxygen Delivery Method): nasal cannula  O2 Flow (L/min): 3          PHYSICAL EXAM:  Constitutional: well-appearing  Head: NC/AT  EENT: PERRL, anicteric sclera; oropharynx clear, MMM  Neck: supple, no appreciable JVD  Respiratory: CTA B/L; no W/R/R  Cardiovascular: +S1/S2, RRR  Gastrointestinal: soft, NT/ND  Extremities: WWP; no edema, clubbing or cyanosis  Vascular: 2+ radial pulses B/L  Neurological: awake and alert; WRIGHT    LABS:      CBC Full  -  ( 27 Aug 2023 05:30 )  WBC Count : 7.26 K/uL  RBC Count : 4.49 M/uL  Hemoglobin : 13.7 g/dL  Hematocrit : 42.8 %  Platelet Count - Automated : 160 K/uL  Mean Cell Volume : 95.3 fl  Mean Cell Hemoglobin : 30.5 pg  Mean Cell Hemoglobin Concentration : 32.0 gm/dL  Auto Neutrophil # : 5.65 K/uL  Auto Lymphocyte # : 0.73 K/uL  Auto Monocyte # : 0.71 K/uL  Auto Eosinophil # : 0.06 K/uL  Auto Basophil # : 0.03 K/uL  Auto Neutrophil % : 77.8 %  Auto Lymphocyte % : 10.1 %  Auto Monocyte % : 9.8 %  Auto Eosinophil % : 0.8 %  Auto Basophil % : 0.4 %        135  |  104  |  14  ----------------------------<  105<H>  4.2   |  23  |  0.79    Ca    8.8      27 Aug 2023 05:30  Phos  4.0       Mg     2.0         TPro  6.3  /  Alb  2.9<L>  /  TBili  1.1  /  DBili  x   /  AST  47<H>  /  ALT  50<H>  /  AlkPhos  55  08-27    PT/INR - ( 26 Aug 2023 12:55 )   PT: 12.5 sec;   INR: 1.10          PTT - ( 26 Aug 2023 12:55 )  PTT:28.8 sec      Urinalysis Basic - ( 27 Aug 2023 05:30 )    Color: x / Appearance: x / SG: x / pH: x  Gluc: 105 mg/dL / Ketone: x  / Bili: x / Urobili: x   Blood: x / Protein: x / Nitrite: x   Leuk Esterase: x / RBC: x / WBC x   Sq Epi: x / Non Sq Epi: x / Bacteria: x                RADIOLOGY & ADDITIONAL STUDIES: PULMONARY SERVICE INITIAL CONSULT NOTE    HPI:  This is a 62 year old female with a past medical history of afib, and sciatica, who presented to [facility] for shortness of breath. Pt states that she went to the urgent care today where she had an XR done concerning for b/l lower infiltrates, and was driven here to the ED by car from the ambulance. She states that she can only walk 12-15 feet and feels short of breath. She states that she has a pulse ox at home and had readings of 82. She states that she had chronic SOB for several months and got worked up in [place] where she had a CT scan that was negative for PE. She also states that she was seen by a pulmonologist and rheumatology, where they ruled out lupus and other immunological disorders. Pt states that she has a cough with no sputum. Pt denies any uri sxs, chest pain, pain with breathing, abdominal pain, nvd, headaches,     ED Course:  Vitals: 97.6, 97, 113/76, 18 94% 2L nc  Labs: Wbc 9.83 Hb 15.4, Hct 46.1, Plt 222, D-dimer 666, Trop T 123, VBG 7.41 CO2 29.3, RVP neg, Cov2 neg,  Imaging: CTA: no visualized PE  EKhr sinus rhythm  Consult: Cardiology (26 Aug 2023 18:38)    Additional Pulmonary History: History as above. Reports worsening shortness of breath over the last several months with extensive workup in [place]. 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 No pets or specific allergies she is aware of. Parents are both  and is unaware of any significant family history. No other personal history of pulmonary parenchumal disease or rheumatologic phenomena although does have digit paresthesias secondary to cervical spine issues for which she was given a short course of steroids. Negligible smoking history. No correlation between symptoms and geographical location, although in hindsight she admits it may be worse when she is in [place].     REVIEW OF SYSTEMS:  Constitutional: No fever, weight loss or fatigue  Eyes: No eye pain, visual disturbances, or discharge  ENMT:  No difficulty hearing, tinnitus, vertigo; No sinus or throat pain  Neck: No pain, stiffness or neck swelling  Respiratory: see HPI  Cardiovascular: No chest pain, palpitations, dizziness or leg swelling  Gastrointestinal: No abdominal or epigastric pain. No nausea, vomiting or hematemesis; No diarrhea or constipation. No melena or hematochezia.  Genitourinary: No dysuria, frequency, hematuria or incontinence  Neurological: No headaches, memory loss, loss of strength, numbness or tremors  Skin: No itching, burning, rashes or lesions   Lymph Nodes: No enlarged glands  Endocrine: No heat or cold intolerance; No hair loss  Musculoskeletal: No joint pain or swelling; No muscle, back or extremity pain  Psychiatric: No depression, anxiety, mood swings or difficulty sleeping  Heme/Lymph: No easy bruising or bleeding gums  Allergy and Immunologic: No hives or eczema    PAST MEDICAL & SURGICAL HISTORY:      FAMILY HISTORY:      SOCIAL HISTORY:  Smoking Status: [ ] Current, [ ] Former, [ ] Never  Pack Years:    MEDICATIONS:  Pulmonary:    Antimicrobials:    Anticoagulants:  enoxaparin Injectable 40 milliGRAM(s) SubCutaneous every 12 hours    Onc:    GI/:  pantoprazole    Tablet 40 milliGRAM(s) Oral before breakfast    Endocrine:  dextrose 50% Injectable 25 Gram(s) IV Push once  dextrose 50% Injectable 12.5 Gram(s) IV Push once  dextrose 50% Injectable 25 Gram(s) IV Push once  dextrose Oral Gel 15 Gram(s) Oral once PRN  glucagon  Injectable 1 milliGRAM(s) IntraMuscular once  insulin lispro (ADMELOG) corrective regimen sliding scale   SubCutaneous three times a day before meals  predniSONE   Tablet 40 milliGRAM(s) Oral daily    Cardiac:    Other Medications:  acetaminophen     Tablet .. 650 milliGRAM(s) Oral every 6 hours PRN  dextrose 5%. 1000 milliLiter(s) IV Continuous <Continuous>  dextrose 5%. 1000 milliLiter(s) IV Continuous <Continuous>  gabapentin 100 milliGRAM(s) Oral three times a day      Allergies    No Known Allergies    Intolerances        Vital Signs Last 24 Hrs  T(C): 37 (27 Aug 2023 05:41), Max: 37.6 (26 Aug 2023 20:40)  T(F): 98.6 (27 Aug 2023 05:41), Max: 99.6 (26 Aug 2023 20:40)  HR: 89 (27 Aug 2023 05:41) (89 - 108)  BP: 115/69 (27 Aug 2023 05:41) (113/76 - 156/88)  BP(mean): 85 (26 Aug 2023 20:40) (85 - 85)  RR: 18 (27 Aug 2023 11:00) (18 - 22)  SpO2: 91% (27 Aug 2023 11:00) (88% - 98%)    Parameters below as of 27 Aug 2023 11:00  Patient On (Oxygen Delivery Method): nasal cannula  O2 Flow (L/min): 3          PHYSICAL EXAM:  Constitutional: well-appearing  Head: NC/AT  EENT: PERRL, anicteric sclera; oropharynx clear, MMM  Neck: supple, no appreciable JVD  Respiratory: CTA B/L; no W/R/R  Cardiovascular: +S1/S2, RRR  Gastrointestinal: soft, NT/ND  Extremities: WWP; no edema, clubbing or cyanosis  Vascular: 2+ radial pulses B/L  Neurological: awake and alert; WRIGHT    LABS:      CBC Full  -  ( 27 Aug 2023 05:30 )  WBC Count : 7.26 K/uL  RBC Count : 4.49 M/uL  Hemoglobin : 13.7 g/dL  Hematocrit : 42.8 %  Platelet Count - Automated : 160 K/uL  Mean Cell Volume : 95.3 fl  Mean Cell Hemoglobin : 30.5 pg  Mean Cell Hemoglobin Concentration : 32.0 gm/dL  Auto Neutrophil # : 5.65 K/uL  Auto Lymphocyte # : 0.73 K/uL  Auto Monocyte # : 0.71 K/uL  Auto Eosinophil # : 0.06 K/uL  Auto Basophil # : 0.03 K/uL  Auto Neutrophil % : 77.8 %  Auto Lymphocyte % : 10.1 %  Auto Monocyte % : 9.8 %  Auto Eosinophil % : 0.8 %  Auto Basophil % : 0.4 %        135  |  104  |  14  ----------------------------<  105<H>  4.2   |  23  |  0.79    Ca    8.8      27 Aug 2023 05:30  Phos  4.0       Mg     2.0         TPro  6.3  /  Alb  2.9<L>  /  TBili  1.1  /  DBili  x   /  AST  47<H>  /  ALT  50<H>  /  AlkPhos  55  0827    PT/INR - ( 26 Aug 2023 12:55 )   PT: 12.5 sec;   INR: 1.10          PTT - ( 26 Aug 2023 12:55 )  PTT:28.8 sec      Urinalysis Basic - ( 27 Aug 2023 05:30 )    Color: x / Appearance: x / SG: x / pH: x  Gluc: 105 mg/dL / Ketone: x  / Bili: x / Urobili: x   Blood: x / Protein: x / Nitrite: x   Leuk Esterase: x / RBC: x / WBC x   Sq Epi: x / Non Sq Epi: x / Bacteria: x                RADIOLOGY & ADDITIONAL STUDIES:

## 2023-08-27 NOTE — PROGRESS NOTE ADULT - PROBLEM SELECTOR PLAN 5
AST 58, ALT 71. May be elevated in the setting of decreased perfusion. Cr wnl    - trend cmp daily  - hep panel AST 58, ALT 71. May be elevated in the setting of decreased perfusion. Cr wnl  Hep panel non-reactive     - trend cmp daily

## 2023-08-27 NOTE — PROGRESS NOTE ADULT - ATTENDING COMMENTS
62 year old female with a past medical history of afib, and sciatica, who presented to [facility] for shortness of breath found to have tropinemia and interstitial lung disease admitted for hypoxia.    Labs and imaging reviewed    Problem List  #Acute Hypoxic Resp Failure  #Interstitial Lung Disease  #Chronic Afib  #Troponinemia likely Type 2 MI  #Sciatica      Plan:  -Consulting Pulm for ILD discovered on CT, awaiting recommendations  -Consulting Cardiology given subacute nature of dyspnea and troponin elevation concern for possible underlying ischemia  -TTE pending  -Continue to titrate O2, patient is saturating well but visibly dyspneic, likely needs flow at this time.     Rest as above

## 2023-08-27 NOTE — CONSULT NOTE ADULT - PROBLEM SELECTOR RECOMMENDATION 9
Presenting in acute respiratory failure, with imaging should bilateral reticulations and ground glass opacities. Given reticular pattern, underlying interstitial lung disease is a concern, however, the presence of ground glass favors a diagnosis alternative to UIP (usual interstitial pneumonia). More likely she has some underlying rheumatologic disease, painting a picture of an NSIP (non specific interstitial pneumonia). There is also a possiblity that she has some level of cellular HP (hypersensitivity pneumonitis) given some geographic correlation with her symptoms, suggesting some antigen she is being exposed to exacerbating her symptoms. There may also be an element of fluid overload contributing and should warrant an evaluation for any diastologic dysfunction contributing to a cardiogenic component to her shortness of breath.     Recommend:   - rheumatologic workup with sjogren antibodies, ARIANA, RF, centromere abs, anti CCP, anti RNP, myomarker panel and systemic sclerosis panel (all received)   - hypersensitivity pneumonitis panel (ordered)   - full TTE for evaluation of cardiac function and diastology   - PT evaluation and document ambulatory saturation if possible; may require establishment of home oxygen   - nutrition counseling and weight loss   - may ultimately require bronchoscopy with biopsy for definitive diagnosis   - would hold any steroids at the moment so as to increase yield of diagnostic studies   - will require dedicated outpatient pulmonary follow up with formal PFTs, 6MWT Presenting in acute respiratory failure, with imaging should bilateral reticulations and ground glass opacities. Given reticular pattern, underlying interstitial lung disease is a concern, however, the presence of ground glass favors a diagnosis alternative to UIP (usual interstitial pneumonia). More likely she has some underlying rheumatologic disease, painting a picture of an NSIP (non specific interstitial pneumonia). There is also a possiblity that she has some level of cellular HP (hypersensitivity pneumonitis) given some geographic correlation with her symptoms, suggesting some antigen she is being exposed to exacerbating her symptoms. There may also be an element of fluid overload contributing and should warrant an evaluation for any diastologic dysfunction contributing to a cardiogenic component to her shortness of breath.     Recommend:   - rheumatologic workup with sjogren antibodies, ARIANA, RF, centromere abs, anti CCP, anti RNP, myomarker panel and systemic sclerosis panel (all received)   - hypersensitivity pneumonitis panel (ordered)   - full TTE for evaluation of cardiac function and diastology   - please have discs she has available with radiographic imaging done at [facility] uploaded to PACS  - PT evaluation and document ambulatory saturation if possible; may require establishment of home oxygen   - nutrition counseling and weight loss   - may ultimately require bronchoscopy with biopsy for definitive diagnosis   - would hold any steroids at the moment so as to increase yield of diagnostic studies   - will require dedicated outpatient pulmonary follow up with formal PFTs, 6MWT

## 2023-08-27 NOTE — PROGRESS NOTE ADULT - PROBLEM SELECTOR PLAN 2
No acs sxs. Elevated troponin, downtrending. In the setting of chronic sob and dyspnea on exertion. BNP wnl    - f/u cardiology consult  - f/u a1c, lipid profile,  - f/u tte No acs sxs. Elevated troponin, downtrending. In the setting of chronic sob and dyspnea on exertion. BNP wnl  A1c 6.1  Cholesterol 152, triglycerides 104, HDL 32, LDL 99  - f/u cardiology consult- recommending work up for ILD in patient with normal TTE outpatient in June.  - f/u tte

## 2023-08-27 NOTE — CONSULT NOTE ADULT - ASSESSMENT
62F w/ a fib p/w progressive chronic MANZANO w/ bilateral infiltrates on CT likely in setting of underlying lung pathology.     TTE 2021 - normal RV/LV fxn w/ mild LVH     #MANZANO - likely primary lung etiol. less likely cardiac w/ normal BNP   Obtain formal TTE    #Myocardial injury - non ACS.   Downtrending  - No further troponin trend indicated       62F w/ a fib p/w progressive chronic MANZANO since march w/ bilateral infiltrates on CT which have progressed over the last few months likely in setting of underlying lung pathology.     TTE 2021 - normal RV/LV fxn w/ mild LVH   tte 7/2023 [place]: nl lv fxn w/ normal diastology, nl rv systolix fxn, normal atria and no sig valve dz    ecg 8/26: sins w/ low voltage. non specfic t wave flattening    #MANZANO - likely primary lung etiol. less likely cardiac w/ normal BNP, and normal tte  Can obtain updated tte  rec evaluation for primary lung etiology/ILD.  euvolemic. no indication for diuresis    #Myocardial injury - non ACS.   Downtrending  - No further troponin trend indicated

## 2023-08-27 NOTE — PROGRESS NOTE ADULT - PROBLEM SELECTOR PLAN 1
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 RVP, cov negative. Hb and Hct elevated in setting of  chronic sob  CXR: pulmonary infiltrates  CTA: Bilateral interstitial lung disease. Differential diagnosis includes HP,NSIP, atypical pneumonia.    Possibly idiopathic pulmonary fibrosis with chronic duration given CTA findings and CXR. 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 Symptoms have not improved with prednisone 20mg BID, may be less likely ILD secondary to rheumatological causes. Less likely interstitial pneumonia given asymptomatic and wbc wnl. Physical exam not notable for connective tissue diseases.     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 RVP, cov negative. Hb and Hct elevated in setting of  chronic sob  CXR: pulmonary infiltrates  CTA: Bilateral interstitial lung disease. Differential diagnosis includes HP, NSIP, atypical pneumonia.    Possibly idiopathic pulmonary fibrosis with chronic duration given CTA findings and CXR. 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 Symptoms have not improved with prednisone 20mg BID, may be less likely ILD secondary to rheumatological causes. Less likely interstitial pneumonia given asymptomatic and wbc wnl. Physical exam not notable for connective tissue diseases.   Procal 0.09    - f/u pulm consult  - prednisone 40mg qD  - consider ct chest  - incentive spirometer  - f/u legionella and strep  - wean o2

## 2023-08-27 NOTE — CONSULT NOTE ADULT - ASSESSMENT
Ms. Hager is a 62 year old female with a past medical history of afib, and sciatica, who presented to [facility] for shortness of breath. Pulmonary consulted for acute hypoxic respiratory failure.     #Acute Hypoxic Respiratory Failure   #R/O ILD     Data Reviewed:   CT Chest 8/27/23:   Labwork: No peripheral eosinophilia, rheumatologic workup pending      Ms. Hager is a 62 year old female with a past medical history of afib, and sciatica, who presented to [facility] for shortness of breath. Pulmonary consulted for acute hypoxic respiratory failure.     #Acute Hypoxic Respiratory Failure   #R/O ILD     Data Reviewed:   CT Chest 8/27/23:   Labwork: No peripheral eosinophilia, rheumatologic workup pending  Collateral from [facility] ([place]) workup: CT Chest report 6/2023 (Lingular GGO and scarring of right lung base), no sig labwork obtained (no rheumatologic studies available), no PE on CT angio performed in late June 2023

## 2023-08-27 NOTE — PATIENT PROFILE ADULT - NSPROMEDSDISPOSITION_GEN_A_NUR
In patients bag and instructed not to take home meds while in hospital. Patient states they understand and will not take home medications while in hospital./bedside

## 2023-08-27 NOTE — PATIENT PROFILE ADULT - NSPRESCRUSEDDRG_GEN_A_NUR
Spoke with patient. Dr. Avinash Schmidt prescribed ensure. Patient is stating rx is costing $65 and he cannot afford that. Is there any way we can get this covered for him? Requests strawberry flavor if possible. Seeing new gastro provider on 6/5/17. Dr. Helena Hinojosa at [facility].  He needs an insurance authorization for referral. No

## 2023-08-27 NOTE — PROGRESS NOTE ADULT - SUBJECTIVE AND OBJECTIVE BOX
Subjective/ROS: Patient seen and examined at bedside. Patient states that she has a sinus headache and that she gets them sometimes. She also endorses a cough with some shortness of breath and post nasal drip. The patient also states that she has a hx pinched nerve in her neck.    Denies Fever/Chills, CP, n/v, changes in bowel/urinary habits.  12pt ROS otherwise negative.    VITALS  Vital Signs Last 24 Hrs  T(C): 37 (27 Aug 2023 05:41), Max: 37.6 (26 Aug 2023 20:40)  T(F): 98.6 (27 Aug 2023 05:41), Max: 99.6 (26 Aug 2023 20:40)  HR: 89 (27 Aug 2023 05:41) (89 - 100)  BP: 115/69 (27 Aug 2023 05:41) (113/76 - 144/68)  BP(mean): 85 (26 Aug 2023 20:40) (85 - 85)  RR: 18 (27 Aug 2023 11:00) (18 - 20)  SpO2: 91% (27 Aug 2023 11:00) (88% - 98%)    Parameters below as of 27 Aug 2023 11:00  Patient On (Oxygen Delivery Method): nasal cannula  O2 Flow (L/min): 3      CAPILLARY BLOOD GLUCOSE      POCT Blood Glucose.: 168 mg/dL (27 Aug 2023 09:56)      PHYSICAL EXAM  General: NAD  Head: NC/AT; MMM; PERRL; EOMI;  Neck: Supple; no JVD  Respiratory: wheezes in lung bases  Cardiovascular: Regular rhythm/rate; S1/S2+, no murmurs  Gastrointestinal: Soft; NTND;  Extremities: WWP; lymphedema present in the BL lower extemities  Neurological: A&Ox3; no obvious focal deficits    MEDICATIONS  (STANDING):  dextrose 5%. 1000 milliLiter(s) (50 mL/Hr) IV Continuous <Continuous>  dextrose 5%. 1000 milliLiter(s) (100 mL/Hr) IV Continuous <Continuous>  dextrose 50% Injectable 25 Gram(s) IV Push once  dextrose 50% Injectable 12.5 Gram(s) IV Push once  dextrose 50% Injectable 25 Gram(s) IV Push once  enoxaparin Injectable 40 milliGRAM(s) SubCutaneous every 12 hours  gabapentin 100 milliGRAM(s) Oral three times a day  glucagon  Injectable 1 milliGRAM(s) IntraMuscular once  insulin lispro (ADMELOG) corrective regimen sliding scale   SubCutaneous three times a day before meals  pantoprazole    Tablet 40 milliGRAM(s) Oral before breakfast  predniSONE   Tablet 40 milliGRAM(s) Oral daily    MEDICATIONS  (PRN):  acetaminophen     Tablet .. 650 milliGRAM(s) Oral every 6 hours PRN Temp greater or equal to 38C (100.4F), Mild Pain (1 - 3)  dextrose Oral Gel 15 Gram(s) Oral once PRN Blood Glucose LESS THAN 70 milliGRAM(s)/deciliter      No Known Allergies      LABS                        13.7   7.26  )-----------( 160      ( 27 Aug 2023 05:30 )             42.8     08-27    135  |  104  |  14  ----------------------------<  105<H>  4.2   |  23  |  0.79    Ca    8.8      27 Aug 2023 05:30  Phos  4.0     08-27  Mg     2.0     08-27    TPro  6.3  /  Alb  2.9<L>  /  TBili  1.1  /  DBili  x   /  AST  47<H>  /  ALT  50<H>  /  AlkPhos  55  08-27    PT/INR - ( 26 Aug 2023 12:55 )   PT: 12.5 sec;   INR: 1.10          PTT - ( 26 Aug 2023 12:55 )  PTT:28.8 sec  Urinalysis Basic - ( 27 Aug 2023 05:30 )    Color: x / Appearance: x / SG: x / pH: x  Gluc: 105 mg/dL / Ketone: x  / Bili: x / Urobili: x   Blood: x / Protein: x / Nitrite: x   Leuk Esterase: x / RBC: x / WBC x   Sq Epi: x / Non Sq Epi: x / Bacteria: x      CARDIAC MARKERS ( 26 Aug 2023 16:15 )  x     / x     / 104 U/L / x     / 6.9 ng/mL          IMAGING/EKG/ETC

## 2023-08-27 NOTE — PROGRESS NOTE ADULT - PROBLEM SELECTOR PLAN 6
Pt states she had afib 1 month ago. Has not been taking medications.    - not currently in afib  - consider lopressor 12.5

## 2023-08-27 NOTE — PROGRESS NOTE ADULT - PROBLEM SELECTOR PLAN 3
Trops downtrending. Troponin may be elevated in the setting of demand ischemia. Pt has history of chronic SOB. BNP wnl    - f/u cardiology rec  - continue to monitor symptoms  - f/u tte

## 2023-08-27 NOTE — PROGRESS NOTE ADULT - ASSESSMENT
62 year old female with a past medical history of afib, and sciatica, who presented to [facility] for shortness of breath found to have tropinemia and interstitial lung disease admitted for hypoxia. 62 year old female with a past medical history of afib, and sciatica, who presented to [facility] for shortness of breath found to have tropinemia and interstitial lung disease admitted for hypoxia.

## 2023-08-27 NOTE — PROGRESS NOTE ADULT - PROBLEM SELECTOR PLAN 4
CXR revealed ground glass opacity. CTA revealed interstitial lung disease.    - continue to monitor sxs  - f/u procal CXR revealed ground glass opacity. CTA revealed interstitial lung disease.  Procal 0.09    - continue to monitor sxs

## 2023-08-27 NOTE — PROGRESS NOTE ADULT - PROBLEM SELECTOR PLAN 8
F: none given  E: If k>4 or mag>2 replete prn  N: dash diet  G: protonix  DVT: lovenox  Dispo; 7uris

## 2023-08-27 NOTE — CONSULT NOTE ADULT - ATTENDING COMMENTS
Patient is a 62 F w/ PMHX of AFib (off bb and a/c) who p/w progressive chronic MANZANO since march w/ bilateral infiltrates on CT which have progressed over the last few months likely in setting of underlying lung pathology. Cardiology consulted for Evaluation of MANZANO    REVIEW OF STUDIES  - TTE 2021 - normal RV/LV fxn w/ mild LVH   - TTE 7/2023 [place]: nl lv fxn w/ normal diastology, normal systolic fxn, normal atria and no sig valve dz  - EKG 8/26: sins w/ low voltage. non specific t wave flattening    # MANZANO/Hypoxic Respiratory Failure  - At this time believe patient's Symptoms to be secondary to primary lung etiology rather than Cardiac  - Echos reviewed with normal Biventricular function without valvular heart disease. No evidence of Diastolic dysfunction. To that end patient's BNP has also been normal  - CT chest showed  ground glass opacities/infiltrates   -Recommend continued pulmonary evaluation to better assess etiology of Hypoxic Respiratory Failure    # Atrial Fibrillation  - Patient diagnosed with AF a month prior to this hospitalization, recommended a Beta Blocker and AC which as an outpatient which she has deferred  - At this time would recommend restarting Toprol 25 mg daily to maintain rate control. Patient is at risk r recurrent AF given underlying lung disease. Hypoxia  - [id number] of +1 for Female Sex. Currently on Lovenox Full AC dose. Can continue with ASA for now. Patient will turn 63 in a few months (CHADVASC of 2). If her thromboembolic risk increases, she would like to have a Watchman and would like to defer indefinite AC  - Please ensure she has outpatient EP follow up upon DC with Dr. Cadena for close surveillance of her arrythmia

## 2023-08-28 ENCOUNTER — APPOINTMENT (OUTPATIENT)
Dept: INTERNAL MEDICINE | Facility: CLINIC | Age: 63
End: 2023-08-28

## 2023-08-28 LAB
ALBUMIN SERPL ELPH-MCNC: 3.1 G/DL — LOW (ref 3.3–5)
ALP SERPL-CCNC: 57 U/L — SIGNIFICANT CHANGE UP (ref 40–120)
ALT FLD-CCNC: 55 U/L — HIGH (ref 10–45)
ANION GAP SERPL CALC-SCNC: 8 MMOL/L — SIGNIFICANT CHANGE UP (ref 5–17)
ANTI-RIBONUCLEAR PROTEIN: >8 AI — HIGH
AST SERPL-CCNC: 58 U/L — HIGH (ref 10–40)
BASOPHILS # BLD AUTO: 0.02 K/UL — SIGNIFICANT CHANGE UP (ref 0–0.2)
BASOPHILS NFR BLD AUTO: 0.3 % — SIGNIFICANT CHANGE UP (ref 0–2)
BILIRUB SERPL-MCNC: 0.8 MG/DL — SIGNIFICANT CHANGE UP (ref 0.2–1.2)
BUN SERPL-MCNC: 12 MG/DL — SIGNIFICANT CHANGE UP (ref 7–23)
CALCIUM SERPL-MCNC: 9 MG/DL — SIGNIFICANT CHANGE UP (ref 8.4–10.5)
CCP IGG SERPL-ACNC: <8 UNITS — SIGNIFICANT CHANGE UP
CENTROMERE AB SER-ACNC: <0.2 AI — SIGNIFICANT CHANGE UP
CHLORIDE SERPL-SCNC: 104 MMOL/L — SIGNIFICANT CHANGE UP (ref 96–108)
CO2 SERPL-SCNC: 24 MMOL/L — SIGNIFICANT CHANGE UP (ref 22–31)
CREAT SERPL-MCNC: 0.74 MG/DL — SIGNIFICANT CHANGE UP (ref 0.5–1.3)
DSDNA AB FLD-ACNC: <0.2 AI — SIGNIFICANT CHANGE UP
EGFR: 90 ML/MIN/1.73M2 — SIGNIFICANT CHANGE UP
ENA SS-A AB FLD IA-ACNC: 0.4 AI — SIGNIFICANT CHANGE UP
EOSINOPHIL # BLD AUTO: 0.11 K/UL — SIGNIFICANT CHANGE UP (ref 0–0.5)
EOSINOPHIL NFR BLD AUTO: 1.6 % — SIGNIFICANT CHANGE UP (ref 0–6)
GLUCOSE BLDC GLUCOMTR-MCNC: 114 MG/DL — HIGH (ref 70–99)
GLUCOSE BLDC GLUCOMTR-MCNC: 132 MG/DL — HIGH (ref 70–99)
GLUCOSE BLDC GLUCOMTR-MCNC: 133 MG/DL — HIGH (ref 70–99)
GLUCOSE BLDC GLUCOMTR-MCNC: 169 MG/DL — HIGH (ref 70–99)
GLUCOSE SERPL-MCNC: 108 MG/DL — HIGH (ref 70–99)
HCT VFR BLD CALC: 43.3 % — SIGNIFICANT CHANGE UP (ref 34.5–45)
HGB BLD-MCNC: 14.2 G/DL — SIGNIFICANT CHANGE UP (ref 11.5–15.5)
IMM GRANULOCYTES NFR BLD AUTO: 0.9 % — SIGNIFICANT CHANGE UP (ref 0–0.9)
LYMPHOCYTES # BLD AUTO: 1.08 K/UL — SIGNIFICANT CHANGE UP (ref 1–3.3)
LYMPHOCYTES # BLD AUTO: 15.8 % — SIGNIFICANT CHANGE UP (ref 13–44)
MAGNESIUM SERPL-MCNC: 2 MG/DL — SIGNIFICANT CHANGE UP (ref 1.6–2.6)
MCHC RBC-ENTMCNC: 31.3 PG — SIGNIFICANT CHANGE UP (ref 27–34)
MCHC RBC-ENTMCNC: 32.8 GM/DL — SIGNIFICANT CHANGE UP (ref 32–36)
MCV RBC AUTO: 95.6 FL — SIGNIFICANT CHANGE UP (ref 80–100)
MONOCYTES # BLD AUTO: 0.61 K/UL — SIGNIFICANT CHANGE UP (ref 0–0.9)
MONOCYTES NFR BLD AUTO: 8.9 % — SIGNIFICANT CHANGE UP (ref 2–14)
NEUTROPHILS # BLD AUTO: 4.97 K/UL — SIGNIFICANT CHANGE UP (ref 1.8–7.4)
NEUTROPHILS NFR BLD AUTO: 72.5 % — SIGNIFICANT CHANGE UP (ref 43–77)
NRBC # BLD: 0 /100 WBCS — SIGNIFICANT CHANGE UP (ref 0–0)
PHOSPHATE SERPL-MCNC: 3.5 MG/DL — SIGNIFICANT CHANGE UP (ref 2.5–4.5)
PLATELET # BLD AUTO: 150 K/UL — SIGNIFICANT CHANGE UP (ref 150–400)
POTASSIUM SERPL-MCNC: 3.8 MMOL/L — SIGNIFICANT CHANGE UP (ref 3.5–5.3)
POTASSIUM SERPL-SCNC: 3.8 MMOL/L — SIGNIFICANT CHANGE UP (ref 3.5–5.3)
PROT SERPL-MCNC: 6.3 G/DL — SIGNIFICANT CHANGE UP (ref 6–8.3)
RBC # BLD: 4.53 M/UL — SIGNIFICANT CHANGE UP (ref 3.8–5.2)
RBC # FLD: 14.8 % — HIGH (ref 10.3–14.5)
RF+CCP IGG SER-IMP: NEGATIVE — SIGNIFICANT CHANGE UP
SODIUM SERPL-SCNC: 136 MMOL/L — SIGNIFICANT CHANGE UP (ref 135–145)
WBC # BLD: 6.85 K/UL — SIGNIFICANT CHANGE UP (ref 3.8–10.5)
WBC # FLD AUTO: 6.85 K/UL — SIGNIFICANT CHANGE UP (ref 3.8–10.5)

## 2023-08-28 PROCEDURE — 99232 SBSQ HOSP IP/OBS MODERATE 35: CPT | Mod: GC

## 2023-08-28 PROCEDURE — 93306 TTE W/DOPPLER COMPLETE: CPT | Mod: 26

## 2023-08-28 PROCEDURE — 71250 CT THORAX DX C-: CPT | Mod: 26

## 2023-08-28 RX ADMIN — GABAPENTIN 100 MILLIGRAM(S): 400 CAPSULE ORAL at 06:07

## 2023-08-28 RX ADMIN — ENOXAPARIN SODIUM 40 MILLIGRAM(S): 100 INJECTION SUBCUTANEOUS at 19:20

## 2023-08-28 RX ADMIN — GABAPENTIN 100 MILLIGRAM(S): 400 CAPSULE ORAL at 22:26

## 2023-08-28 RX ADMIN — PANTOPRAZOLE SODIUM 40 MILLIGRAM(S): 20 TABLET, DELAYED RELEASE ORAL at 06:07

## 2023-08-28 RX ADMIN — GABAPENTIN 100 MILLIGRAM(S): 400 CAPSULE ORAL at 14:15

## 2023-08-28 RX ADMIN — ENOXAPARIN SODIUM 40 MILLIGRAM(S): 100 INJECTION SUBCUTANEOUS at 06:08

## 2023-08-28 RX ADMIN — Medication 40 MILLIGRAM(S): at 06:07

## 2023-08-28 NOTE — PROGRESS NOTE ADULT - PROBLEM SELECTOR PLAN 4
CXR revealed ground glass opacity. CTA revealed interstitial lung disease.  Procal 0.09    - continue to monitor sxs CXR revealed ground glass opacity. CTA revealed interstitial lung disease.  Procal 0.09  Anti- SSA 0.4, SSB <0.2, rheumatoid factor quant <10, CCP (-), centromere ab <0.2, anti-ribonuclear >8.0    - f/u rheumatologic workup: ANCA, ARIAAN, anti-dsDNA, complement levels, myomarker panel, and systemic sclerosis panel  - f/u hypersensitivity pneumonitis panel  - continue to monitor sxs

## 2023-08-28 NOTE — PROGRESS NOTE ADULT - PROBLEM SELECTOR PLAN 5
AST 58, ALT 71. May be elevated in the setting of decreased perfusion. Cr wnl  Hep panel non-reactive     - trend cmp daily

## 2023-08-28 NOTE — PROGRESS NOTE ADULT - ATTENDING COMMENTS
pt seen and examined by me. case d/w housestaff agree with VS, PE, assessment and plan as above with following additives    1-Hypoxic respiratory failure- etiology unclear  pulm following  plan for bronch with biopsy  2- Chronic atrial fibrillation - not on AC  will fu echo results today as well

## 2023-08-28 NOTE — PROGRESS NOTE ADULT - PROBLEM SELECTOR PLAN 3
Trops downtrending. Troponin may be elevated in the setting of demand ischemia. Pt has history of chronic SOB. BNP wnl    - f/u cardiology rec  - continue to monitor symptoms  - f/u tte Trops downtrending. Troponin may be elevated in the setting of demand ischemia. Pt has history of chronic SOB. BNP wnl    - f/u cardiology rec  - continue to monitor symptoms

## 2023-08-28 NOTE — PROGRESS NOTE ADULT - ASSESSMENT
62 year old female with a past medical history of afib, and sciatica, who presented to [facility] for shortness of breath found to have tropinemia and interstitial lung disease admitted for hypoxia.

## 2023-08-28 NOTE — PROGRESS NOTE ADULT - PROBLEM SELECTOR PLAN 2
No acs sxs. Elevated troponin, downtrending. In the setting of chronic sob and dyspnea on exertion. BNP wnl  A1c 6.1  Cholesterol 152, triglycerides 104, HDL 32, LDL 99  - f/u cardiology consult- recommending work up for ILD in patient with normal TTE outpatient in June.  - f/u tte No acs sxs. Elevated troponin, downtrending. In the setting of chronic sob and dyspnea on exertion. BNP wnl  A1c 6.1  Cholesterol 152, triglycerides 104, HDL 32, LDL 99  TTE:  1. Normal left and right ventricular size and systolic function.  2. Normal atria.  3. No significant valvular disease.  4. No evidence of pulmonary hypertension.  5. No pericardial effusion.  6. No prior echo is available for comparison.    - f/c cardiology rec - recommending work up for ILD in patient with normal TTE outpatient in June.

## 2023-08-28 NOTE — PROGRESS NOTE ADULT - ASSESSMENT
62 F w/ PMHX of AFib (off bb and a/c) who p/w progressive chronic MANZANO since march w/ bilateral infiltrates on CT which have progressed over the last few months likely in setting of underlying lung pathology.     TTE 2021 - normal RV/LV fxn w/ mild LVH   TTE 7/2023 [place]: nl lv fxn w/ normal diastology, nl rv systolix fxn, normal atria and no sig valve dz  EKG 8/26: sins w/ low voltage. non specfic t wave flattening    #MANZANO - likely primary lung etiology less likely cardiac w/ normal BNP, normal TTE and prior CT chest with ground glass opacities/infiltrates   - repeat TTE done 8/28 showed NL LVSF, no VHD, normal chambers  - euvolemic on exam, no indication for diuresis  - f/u with pulm recommendations     #Afib  - patient diagnosed with AF 1 mo ago   - started on Toprol x30 days and has since d/c'ed  - recommend restarting Toprol 25 mg daily as patient is at risk for recurrent AF iso hypoxia   - [id number] +1 for female, no indication for a/c at this time    Please see attending attestation for final recommendations.  62 F w/ PMHX of AFib (off bb and a/c) who p/w progressive chronic MANZANO since march w/ bilateral infiltrates on CT which have progressed over the last few months likely in setting of underlying lung pathology.     TTE 2021 - normal RV/LV fxn w/ mild LVH   TTE 7/2023 [place]: nl lv fxn w/ normal diastology, nl rv systolix fxn, normal atria and no sig valve dz  EKG 8/26: sins w/ low voltage. non specfic t wave flattening    #MANZANO - likely primary lung etiology less likely cardiac w/ normal BNP, normal TTE and prior CT chest with ground glass opacities/infiltrates   - repeat TTE done 8/28 showed NL LVSF, no VHD, normal chambers  - euvolemic on exam, no indication for diuresis  - f/u with pulm recommendations     #Afib  - patient diagnosed with AF 1 mo ago   - started on Toprol x30 days and has since d/c'ed  - recommend restarting Toprol 25 mg daily as patient is at risk for recurrent AF iso hypoxia however patient declines  - [id number] +1 for female, no indication for a/c at this time.  - for now, would start ASA 81 mg  - f/u with EP/cardiology out-patient (Dr. Cadena)    Please see attending attestation for final recommendations.

## 2023-08-28 NOTE — PROGRESS NOTE ADULT - SUBJECTIVE AND OBJECTIVE BOX
PULMONARY CONSULT SERVICE FOLLOW-UP NOTE    INTERVAL HPI:  Reviewed chart and overnight events; patient seen and examined at bedside.    MEDICATIONS:  Pulmonary:    Antimicrobials:    Anticoagulants:  enoxaparin Injectable 40 milliGRAM(s) SubCutaneous every 12 hours    Cardiac:      Allergies    No Known Allergies    Intolerances        Vital Signs Last 24 Hrs  T(C): 36.6 (28 Aug 2023 07:25), Max: 37 (27 Aug 2023 13:01)  T(F): 97.9 (28 Aug 2023 07:25), Max: 98.6 (27 Aug 2023 13:01)  HR: 94 (28 Aug 2023 07:25) (87 - 94)  BP: 137/82 (28 Aug 2023 07:25) (118/68 - 137/82)  BP(mean): --  RR: 17 (28 Aug 2023 07:25) (17 - 19)  SpO2: 90% (28 Aug 2023 07:25) (88% - 94%)    Parameters below as of 28 Aug 2023 07:25  Patient On (Oxygen Delivery Method): nasal cannula  O2 Flow (L/min): 3          PHYSICAL EXAM:  Constitutional: NAD  HEENT: NC/AT; PERRL, anicteric sclera; MMM  Neck: supple  Cardiovascular: +S1/S2, RRR  Respiratory: b/l crackles  Gastrointestinal: soft, NT/ND  Extremities: WWP; no edema, clubbing or cyanosis  Vascular: 2+ radial pulses B/L  Neurological: awake and alert; WRIGHT    LABS:      CBC Full  -  ( 28 Aug 2023 06:06 )  WBC Count : 6.85 K/uL  RBC Count : 4.53 M/uL  Hemoglobin : 14.2 g/dL  Hematocrit : 43.3 %  Platelet Count - Automated : 150 K/uL  Mean Cell Volume : 95.6 fl  Mean Cell Hemoglobin : 31.3 pg  Mean Cell Hemoglobin Concentration : 32.8 gm/dL  Auto Neutrophil # : 4.97 K/uL  Auto Lymphocyte # : 1.08 K/uL  Auto Monocyte # : 0.61 K/uL  Auto Eosinophil # : 0.11 K/uL  Auto Basophil # : 0.02 K/uL  Auto Neutrophil % : 72.5 %  Auto Lymphocyte % : 15.8 %  Auto Monocyte % : 8.9 %  Auto Eosinophil % : 1.6 %  Auto Basophil % : 0.3 %    08-28    136  |  104  |  12  ----------------------------<  108<H>  3.8   |  24  |  0.74    Ca    9.0      28 Aug 2023 06:06  Phos  3.5     08-28  Mg     2.0     08-28    TPro  6.3  /  Alb  3.1<L>  /  TBili  0.8  /  DBili  x   /  AST  58<H>  /  ALT  55<H>  /  AlkPhos  57  08-28    PT/INR - ( 26 Aug 2023 12:55 )   PT: 12.5 sec;   INR: 1.10          PTT - ( 26 Aug 2023 12:55 )  PTT:28.8 sec      Urinalysis Basic - ( 28 Aug 2023 06:06 )    Color: x / Appearance: x / SG: x / pH: x  Gluc: 108 mg/dL / Ketone: x  / Bili: x / Urobili: x   Blood: x / Protein: x / Nitrite: x   Leuk Esterase: x / RBC: x / WBC x   Sq Epi: x / Non Sq Epi: x / Bacteria: x                RADIOLOGY & ADDITIONAL STUDIES: PULMONARY CONSULT SERVICE FOLLOW-UP NOTE    INTERVAL HPI:  Reviewed chart and overnight events; patient seen and examined at bedside. Patient denies any skin rashes, joint pain, or new respiratory issues today. She describes GERD-like symptoms and PND. Mentions a "bronchial infection" in [place] 3-4 months ago. Had a CT chest in June.    MEDICATIONS:  Pulmonary:    Antimicrobials:    Anticoagulants:  enoxaparin Injectable 40 milliGRAM(s) SubCutaneous every 12 hours    Cardiac:      Allergies    No Known Allergies    Intolerances        Vital Signs Last 24 Hrs  T(C): 36.6 (28 Aug 2023 07:25), Max: 37 (27 Aug 2023 13:01)  T(F): 97.9 (28 Aug 2023 07:25), Max: 98.6 (27 Aug 2023 13:01)  HR: 94 (28 Aug 2023 07:25) (87 - 94)  BP: 137/82 (28 Aug 2023 07:25) (118/68 - 137/82)  BP(mean): --  RR: 17 (28 Aug 2023 07:25) (17 - 19)  SpO2: 90% (28 Aug 2023 07:25) (88% - 94%)    Parameters below as of 28 Aug 2023 07:25  Patient On (Oxygen Delivery Method): nasal cannula  O2 Flow (L/min): 3          PHYSICAL EXAM:  GENERAL: Pleasant, alert and oriented, not in any acute distress, appears comfortable.  HEENT: Nonicteric sclerae, PERRLA, EOMI. Oropharynx clear. Moist mucous membranes.  CHEST: Chest wall is nontender.  HEART: Regular rate and rhythm without any appreciable m/r/g.  LUNGS: Breathing comfortably on nasal cannula. Could not appreciate breath sounds due to body habitus.  ABDOMEN: Soft, normoactive bowel sounds, nontender in all quadrants.  : No suprapubic tenderness to palpation. No CVA tenderness bilaterally.  SKIN: No rash, no excessive bruising, petechiae, or purpura. No manifestations of rheumatologic disease  NEUROLOGIC: Moves all extremities spontaneously.  EXTREMITIES: WWP; No BLE pitting edema, clubbing or cyanosis      LABS:      CBC Full  -  ( 28 Aug 2023 06:06 )  WBC Count : 6.85 K/uL  RBC Count : 4.53 M/uL  Hemoglobin : 14.2 g/dL  Hematocrit : 43.3 %  Platelet Count - Automated : 150 K/uL  Mean Cell Volume : 95.6 fl  Mean Cell Hemoglobin : 31.3 pg  Mean Cell Hemoglobin Concentration : 32.8 gm/dL  Auto Neutrophil # : 4.97 K/uL  Auto Lymphocyte # : 1.08 K/uL  Auto Monocyte # : 0.61 K/uL  Auto Eosinophil # : 0.11 K/uL  Auto Basophil # : 0.02 K/uL  Auto Neutrophil % : 72.5 %  Auto Lymphocyte % : 15.8 %  Auto Monocyte % : 8.9 %  Auto Eosinophil % : 1.6 %  Auto Basophil % : 0.3 %    08-28    136  |  104  |  12  ----------------------------<  108<H>  3.8   |  24  |  0.74    Ca    9.0      28 Aug 2023 06:06  Phos  3.5     08-28  Mg     2.0     08-28    TPro  6.3  /  Alb  3.1<L>  /  TBili  0.8  /  DBili  x   /  AST  58<H>  /  ALT  55<H>  /  AlkPhos  57  08-28    PT/INR - ( 26 Aug 2023 12:55 )   PT: 12.5 sec;   INR: 1.10          PTT - ( 26 Aug 2023 12:55 )  PTT:28.8 sec      Urinalysis Basic - ( 28 Aug 2023 06:06 )    Color: x / Appearance: x / SG: x / pH: x  Gluc: 108 mg/dL / Ketone: x  / Bili: x / Urobili: x   Blood: x / Protein: x / Nitrite: x   Leuk Esterase: x / RBC: x / WBC x   Sq Epi: x / Non Sq Epi: x / Bacteria: x                RADIOLOGY & ADDITIONAL STUDIES:

## 2023-08-28 NOTE — PROGRESS NOTE ADULT - PROBLEM SELECTOR PLAN 8
F: none given  E: If k>4 or mag>2 replete prn  N: dash diet  G: protonix  DVT: lovenox  Dispo; 7uris EF: EF:60

## 2023-08-28 NOTE — PROGRESS NOTE ADULT - SUBJECTIVE AND OBJECTIVE BOX
Cardiology Consult    O/N: DUNG  Interval History/HPI: Pt seen and examined at bedside, NAD, no complaints at this time. ROS s/f worsening SOB. Denies any chest pain or palpitations. Remainder of ROS otherwise unremarkable   Telemetry: not available     OBJECTIVE  T(C): 36.6 (08-28-23 @ 07:25), Max: 36.9 (08-27-23 @ 20:44)  HR: 94 (08-28-23 @ 07:25) (87 - 94)  BP: 137/82 (08-28-23 @ 07:25) (119/79 - 137/82)  RR: 17 (08-28-23 @ 07:25) (17 - 18)  SpO2: 90% (08-28-23 @ 07:25) (90% - 94%)      PHYSICAL EXAM:    Constitutional: resting comfortably in bed; NAD, + NC  Neck: supple; no thyromegaly, no JVD   Respiratory: CTA B/L; no W/R/R, no retractions  Cardiac: +S1/S2; RRR; no M/R/G; PMI non-displaced  Gastrointestinal: soft, NT/ND; no rebound or guarding; +BSx4  Extremities: WWP, no clubbing or cyanosis; + lymphedema   Vascular: 2+ radial, DP/PT pulses B/L    LABS:                        14.2   6.85  )-----------( 150      ( 28 Aug 2023 06:06 )             43.3     08-28    136  |  104  |  12  ----------------------------<  108<H>  3.8   |  24  |  0.74    Ca    9.0      28 Aug 2023 06:06  Phos  3.5     08-28  Mg     2.0     08-28    TPro  6.3  /  Alb  3.1<L>  /  TBili  0.8  /  DBili  x   /  AST  58<H>  /  ALT  55<H>  /  AlkPhos  57  08-28      Urinalysis Basic - ( 28 Aug 2023 06:06 )    Color: x / Appearance: x / SG: x / pH: x  Gluc: 108 mg/dL / Ketone: x  / Bili: x / Urobili: x   Blood: x / Protein: x / Nitrite: x   Leuk Esterase: x / RBC: x / WBC x   Sq Epi: x / Non Sq Epi: x / Bacteria: x        RADIOLOGY & ADDITIONAL TESTS:  Reviewed .    MEDICATIONS  (STANDING):  dextrose 5%. 1000 milliLiter(s) (50 mL/Hr) IV Continuous <Continuous>  dextrose 5%. 1000 milliLiter(s) (100 mL/Hr) IV Continuous <Continuous>  dextrose 50% Injectable 25 Gram(s) IV Push once  dextrose 50% Injectable 12.5 Gram(s) IV Push once  dextrose 50% Injectable 25 Gram(s) IV Push once  enoxaparin Injectable 40 milliGRAM(s) SubCutaneous every 12 hours  gabapentin 100 milliGRAM(s) Oral three times a day  glucagon  Injectable 1 milliGRAM(s) IntraMuscular once  insulin lispro (ADMELOG) corrective regimen sliding scale   SubCutaneous Before meals and at bedtime  pantoprazole    Tablet 40 milliGRAM(s) Oral before breakfast    MEDICATIONS  (PRN):  acetaminophen     Tablet .. 650 milliGRAM(s) Oral every 6 hours PRN Temp greater or equal to 38C (100.4F), Mild Pain (1 - 3)  dextrose Oral Gel 15 Gram(s) Oral once PRN Blood Glucose LESS THAN 70 milliGRAM(s)/deciliter

## 2023-08-28 NOTE — PROGRESS NOTE ADULT - SUBJECTIVE AND OBJECTIVE BOX
O/N Events: no acute events overnight    Subjective/ROS: Patient seen and examined at bedside. Patient expressed concern for new onset of cold and numb feeling toes. States that the toes feel difficult to move and that the sensation is different from the numbness that she has in her fingers. Patient endorses increased SOB from yesterday and dry cough without sputum.     Denies Fever/Chills, HA, CP, n/v, changes in bowel/urinary habits.  12pt ROS otherwise negative.    VITALS  Vital Signs Last 24 Hrs  T(C): 37 (28 Aug 2023 13:45), Max: 37 (28 Aug 2023 13:45)  T(F): 98.6 (28 Aug 2023 13:45), Max: 98.6 (28 Aug 2023 13:45)  HR: 95 (28 Aug 2023 13:45) (87 - 95)  BP: 147/86 (28 Aug 2023 13:45) (119/79 - 147/86)  BP(mean): --  RR: 18 (28 Aug 2023 13:45) (17 - 18)  SpO2: 95% (28 Aug 2023 13:45) (90% - 95%)    Parameters below as of 28 Aug 2023 13:45  Patient On (Oxygen Delivery Method): nasal cannula  O2 Flow (L/min): 4      CAPILLARY BLOOD GLUCOSE      POCT Blood Glucose.: 132 mg/dL (28 Aug 2023 17:50)  POCT Blood Glucose.: 133 mg/dL (28 Aug 2023 13:23)  POCT Blood Glucose.: 169 mg/dL (28 Aug 2023 08:49)      PHYSICAL EXAM  General: NAD  Head: NC/AT; MMM; PERRL; EOMI;  Neck: Supple; no JVD  Respiratory: CTAB; no wheezes/rales/rhonchi  Cardiovascular: Regular rhythm/rate; S1/S2+, no murmurs, rubs gallops   Gastrointestinal: Soft; NTND; bowel sounds normal and present  Extremities: WWP; no edema/cyanosis  Neurological: A&Ox3, CNII-XII grossly intact; no obvious focal deficits    MEDICATIONS  (STANDING):  dextrose 5%. 1000 milliLiter(s) (50 mL/Hr) IV Continuous <Continuous>  dextrose 5%. 1000 milliLiter(s) (100 mL/Hr) IV Continuous <Continuous>  dextrose 50% Injectable 25 Gram(s) IV Push once  dextrose 50% Injectable 12.5 Gram(s) IV Push once  dextrose 50% Injectable 25 Gram(s) IV Push once  enoxaparin Injectable 40 milliGRAM(s) SubCutaneous every 12 hours  gabapentin 100 milliGRAM(s) Oral three times a day  glucagon  Injectable 1 milliGRAM(s) IntraMuscular once  insulin lispro (ADMELOG) corrective regimen sliding scale   SubCutaneous Before meals and at bedtime  pantoprazole    Tablet 40 milliGRAM(s) Oral before breakfast    MEDICATIONS  (PRN):  acetaminophen     Tablet .. 650 milliGRAM(s) Oral every 6 hours PRN Temp greater or equal to 38C (100.4F), Mild Pain (1 - 3)  dextrose Oral Gel 15 Gram(s) Oral once PRN Blood Glucose LESS THAN 70 milliGRAM(s)/deciliter      No Known Allergies      LABS                        14.2   6.85  )-----------( 150      ( 28 Aug 2023 06:06 )             43.3     08-28    136  |  104  |  12  ----------------------------<  108<H>  3.8   |  24  |  0.74    Ca    9.0      28 Aug 2023 06:06  Phos  3.5     08-28  Mg     2.0     08-28    TPro  6.3  /  Alb  3.1<L>  /  TBili  0.8  /  DBili  x   /  AST  58<H>  /  ALT  55<H>  /  AlkPhos  57  08-28      Urinalysis Basic - ( 28 Aug 2023 06:06 )    Color: x / Appearance: x / SG: x / pH: x  Gluc: 108 mg/dL / Ketone: x  / Bili: x / Urobili: x   Blood: x / Protein: x / Nitrite: x   Leuk Esterase: x / RBC: x / WBC x   Sq Epi: x / Non Sq Epi: x / Bacteria: x              IMAGING/EKG/ETC

## 2023-08-28 NOTE — PROGRESS NOTE ADULT - SUBJECTIVE AND OBJECTIVE BOX
PULMONARY CONSULT SERVICE FOLLOW-UP NOTE    INTERVAL HPI:  Reviewed chart and overnight events; patient seen and examined at bedside.       MEDICATIONS:  Pulmonary:    Antimicrobials:    Anticoagulants:  enoxaparin Injectable 40 milliGRAM(s) SubCutaneous every 12 hours    Cardiac:      Allergies    No Known Allergies    Intolerances        Vital Signs Last 24 Hrs  T(C): 36.6 (28 Aug 2023 07:25), Max: 37 (27 Aug 2023 13:01)  T(F): 97.9 (28 Aug 2023 07:25), Max: 98.6 (27 Aug 2023 13:01)  HR: 94 (28 Aug 2023 07:25) (87 - 94)  BP: 137/82 (28 Aug 2023 07:25) (118/68 - 137/82)  BP(mean): --  RR: 17 (28 Aug 2023 07:25) (17 - 19)  SpO2: 90% (28 Aug 2023 07:25) (88% - 94%)    Parameters below as of 28 Aug 2023 07:25  Patient On (Oxygen Delivery Method): nasal cannula  O2 Flow (L/min): 3          PHYSICAL EXAM:  Constitutional: NAD  HEENT: NC/AT; PERRL, anicteric sclera; MMM  Neck: supple  Cardiovascular: +S1/S2, RRR  Respiratory: CTA B/L; no W/R/R  Gastrointestinal: soft, NT/ND  Extremities: WWP; no edema, clubbing or cyanosis  Vascular: 2+ radial pulses B/L  Neurological: awake and alert; WRIGHT    LABS:      CBC Full  -  ( 28 Aug 2023 06:06 )  WBC Count : 6.85 K/uL  RBC Count : 4.53 M/uL  Hemoglobin : 14.2 g/dL  Hematocrit : 43.3 %  Platelet Count - Automated : 150 K/uL  Mean Cell Volume : 95.6 fl  Mean Cell Hemoglobin : 31.3 pg  Mean Cell Hemoglobin Concentration : 32.8 gm/dL  Auto Neutrophil # : 4.97 K/uL  Auto Lymphocyte # : 1.08 K/uL  Auto Monocyte # : 0.61 K/uL  Auto Eosinophil # : 0.11 K/uL  Auto Basophil # : 0.02 K/uL  Auto Neutrophil % : 72.5 %  Auto Lymphocyte % : 15.8 %  Auto Monocyte % : 8.9 %  Auto Eosinophil % : 1.6 %  Auto Basophil % : 0.3 %    08-28    136  |  104  |  12  ----------------------------<  108<H>  3.8   |  24  |  0.74    Ca    9.0      28 Aug 2023 06:06  Phos  3.5     08-28  Mg     2.0     08-28    TPro  6.3  /  Alb  3.1<L>  /  TBili  0.8  /  DBili  x   /  AST  58<H>  /  ALT  55<H>  /  AlkPhos  57  08-28    PT/INR - ( 26 Aug 2023 12:55 )   PT: 12.5 sec;   INR: 1.10          PTT - ( 26 Aug 2023 12:55 )  PTT:28.8 sec      Urinalysis Basic - ( 28 Aug 2023 06:06 )    Color: x / Appearance: x / SG: x / pH: x  Gluc: 108 mg/dL / Ketone: x  / Bili: x / Urobili: x   Blood: x / Protein: x / Nitrite: x   Leuk Esterase: x / RBC: x / WBC x   Sq Epi: x / Non Sq Epi: x / Bacteria: x                RADIOLOGY & ADDITIONAL STUDIES:

## 2023-08-28 NOTE — PROGRESS NOTE ADULT - PROBLEM SELECTOR PLAN 1
Presenting in acute respiratory failure, with imaging should bilateral reticulations and ground glass opacities. Given reticular pattern, underlying interstitial lung disease is a concern, however, the presence of ground glass favors a diagnosis alternative to UIP (usual interstitial pneumonia). More likely she has some underlying rheumatologic disease, painting a picture of an NSIP (non specific interstitial pneumonia). There is also a possiblity that she has some level of cellular HP (hypersensitivity pneumonitis) given some geographic correlation with her symptoms, suggesting some antigen she is being exposed to exacerbating her symptoms. There may also be an element of fluid overload contributing and should warrant an evaluation for any diastologic dysfunction contributing to a cardiogenic component to her shortness of breath.     Recommend:   - Plan for bronchoscopy  - Recommend HRCT with inspiratory, expiratory, and prone images to further characterize ILD  - F/u rheumatologic workup with sjogren antibodies, ARIANA, RF, centromere abs, anti CCP, anti RNP, myomarker panel and systemic sclerosis panel (all received)   - F/u hypersensitivity pneumonitis panel (received)   - full TTE for evaluation of cardiac function and diastology   - please have discs she has available with radiographic imaging done at [facility] uploaded to PACS  - PT evaluation and document ambulatory saturation if possible; may require establishment of home oxygen   - nutrition counseling and weight loss    - would hold any steroids at the moment so as to increase yield of diagnostic studies   - will require dedicated outpatient pulmonary follow up with formal PFTs, 6MWT. Presenting in acute respiratory failure, with imaging should bilateral reticulations and ground glass opacities. Given reticular pattern, underlying interstitial lung disease is a concern, however, the presence of ground glass favors a diagnosis alternative to UIP (usual interstitial pneumonia) vs NSIP. There is also a possibility that she has some level of cellular HP (hypersensitivity pneumonitis) given some geographic correlation with her symptoms, suggesting some antigen she is being exposed to exacerbating her symptoms.    Recommend:   -Plan for bronchoscopy with [facility] ([facility] location) on Wednesday  -Ordered HRCT with inspiratory, expiratory, with supine and prone images to further characterize ILD  -F/u rheumatologic workup with ANCA,  sjogren antibodies, ARIANA, anti-dsDNA, complement levels, RF, centromere abs, anti CCP, anti RNP, myomarker panel and systemic sclerosis panel  -F/u hypersensitivity pneumonitis panel  -TTE  -ABG, then hyperventilated for 1 minute, and then repeat ABG right after.    -Uploaded prior CT chest from 6/2023 that looks essentially normal.  -PT evaluation and document ambulatory saturation if possible; may require establishment of home oxygen   -Nutrition counseling and weight loss  -Would hold any steroids at the moment so as to increase yield of diagnostic studies, unless patient becomes acutely hypoxic  -Will require dedicated outpatient pulmonary follow up with formal PFTs, 6MWT.

## 2023-08-28 NOTE — PROGRESS NOTE ADULT - PROBLEM SELECTOR PLAN 1
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 RVP, cov negative. Hb and Hct elevated in setting of  chronic sob  CXR: pulmonary infiltrates  CTA: Bilateral interstitial lung disease. Differential diagnosis includes HP, NSIP, atypical pneumonia.    Possibly idiopathic pulmonary fibrosis with chronic duration given CTA findings and CXR. 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 Symptoms have not improved with prednisone 20mg BID, may be less likely ILD secondary to rheumatological causes. Less likely interstitial pneumonia given asymptomatic and wbc wnl. Physical exam not notable for connective tissue diseases.   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 RVP, cov negative. Hb and Hct elevated in setting of  chronic sob  CXR: pulmonary infiltrates  CTA: Bilateral interstitial lung disease. Differential diagnosis includes HP, NSIP, atypical pneumonia.    Possibly idiopathic pulmonary fibrosis with chronic duration given CTA findings and CXR. 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 Symptoms have not improved with prednisone 20mg BID, may be less likely ILD secondary to rheumatological causes. Less likely interstitial pneumonia given asymptomatic and wbc wnl. Physical exam not notable for connective tissue diseases.   Procal 0.09  8/28: patient O2 requirement 3L NC -> 4L NC    - f/u pulm consult  - f/u high resolution ct chest  - f/u legionella and strep  - prednisone 40mg qD  - incentive spirometer  - wean o2

## 2023-08-28 NOTE — PROGRESS NOTE ADULT - ASSESSMENT
Ms. Hager is a 62 year old female with a past medical history of afib, and sciatica, who presented to [facility] for shortness of breath. Pulmonary consulted for acute hypoxic respiratory failure.     #Acute Hypoxic Respiratory Failure   #R/O ILD     Data Reviewed:   CT Chest 8/27/23:   Labwork: No peripheral eosinophilia, rheumatologic workup pending  Collateral from [facility] ([place]) workup: CT Chest report 6/2023 (Lingular GGO and scarring of right lung base), no sig labwork obtained (no rheumatologic studies available), no PE on CT angio performed in late June 2023      Ms. Hager is a 62 year old female with a past medical history of afib, and sciatica, who presented to [facility] for shortness of breath. Pulmonary consulted for acute hypoxic respiratory failure.     #Acute Hypoxic Respiratory Failure   #R/O ILD    Data Reviewed:   CT Chest 8/27/23:   Labwork: No peripheral eosinophilia, rheumatologic workup pending  Collateral from [facility] ([place]) workup: CT Chest report 6/2023 (Lingular GGO and scarring of right lung base), no sig labwork obtained (no rheumatologic studies available), no PE on CT angio performed in late June 2023

## 2023-08-29 LAB
ALBUMIN SERPL ELPH-MCNC: 3.1 G/DL — LOW (ref 3.3–5)
ALP SERPL-CCNC: 58 U/L — SIGNIFICANT CHANGE UP (ref 40–120)
ALT FLD-CCNC: 61 U/L — HIGH (ref 10–45)
ANION GAP SERPL CALC-SCNC: 10 MMOL/L — SIGNIFICANT CHANGE UP (ref 5–17)
AST SERPL-CCNC: 54 U/L — HIGH (ref 10–40)
BASE EXCESS BLDA CALC-SCNC: -0.3 MMOL/L — SIGNIFICANT CHANGE UP (ref -2–3)
BASOPHILS # BLD AUTO: 0.02 K/UL — SIGNIFICANT CHANGE UP (ref 0–0.2)
BASOPHILS NFR BLD AUTO: 0.3 % — SIGNIFICANT CHANGE UP (ref 0–2)
BILIRUB SERPL-MCNC: 0.7 MG/DL — SIGNIFICANT CHANGE UP (ref 0.2–1.2)
BUN SERPL-MCNC: 15 MG/DL — SIGNIFICANT CHANGE UP (ref 7–23)
C3 SERPL-MCNC: 99 MG/DL — SIGNIFICANT CHANGE UP (ref 81–157)
C4 SERPL-MCNC: 12 MG/DL — LOW (ref 13–39)
CALCIUM SERPL-MCNC: 9.2 MG/DL — SIGNIFICANT CHANGE UP (ref 8.4–10.5)
CHLORIDE SERPL-SCNC: 102 MMOL/L — SIGNIFICANT CHANGE UP (ref 96–108)
CO2 BLDA-SCNC: 25 MMOL/L — HIGH (ref 19–24)
CO2 SERPL-SCNC: 24 MMOL/L — SIGNIFICANT CHANGE UP (ref 22–31)
CREAT SERPL-MCNC: 0.8 MG/DL — SIGNIFICANT CHANGE UP (ref 0.5–1.3)
D DIMER BLD IA.RAPID-MCNC: 861 NG/ML DDU — HIGH
EGFR: 82 ML/MIN/1.73M2 — SIGNIFICANT CHANGE UP
EOSINOPHIL # BLD AUTO: 0.09 K/UL — SIGNIFICANT CHANGE UP (ref 0–0.5)
EOSINOPHIL NFR BLD AUTO: 1.2 % — SIGNIFICANT CHANGE UP (ref 0–6)
GLUCOSE BLDC GLUCOMTR-MCNC: 112 MG/DL — HIGH (ref 70–99)
GLUCOSE BLDC GLUCOMTR-MCNC: 122 MG/DL — HIGH (ref 70–99)
GLUCOSE BLDC GLUCOMTR-MCNC: 90 MG/DL — SIGNIFICANT CHANGE UP (ref 70–99)
GLUCOSE SERPL-MCNC: 120 MG/DL — HIGH (ref 70–99)
HCO3 BLDA-SCNC: 24 MMOL/L — SIGNIFICANT CHANGE UP (ref 21–28)
HCT VFR BLD CALC: 44.4 % — SIGNIFICANT CHANGE UP (ref 34.5–45)
HGB BLD-MCNC: 14.2 G/DL — SIGNIFICANT CHANGE UP (ref 11.5–15.5)
IMM GRANULOCYTES NFR BLD AUTO: 0.7 % — SIGNIFICANT CHANGE UP (ref 0–0.9)
LEGIONELLA AG UR QL: NEGATIVE — SIGNIFICANT CHANGE UP
LYMPHOCYTES # BLD AUTO: 1.11 K/UL — SIGNIFICANT CHANGE UP (ref 1–3.3)
LYMPHOCYTES # BLD AUTO: 14.8 % — SIGNIFICANT CHANGE UP (ref 13–44)
MAGNESIUM SERPL-MCNC: 1.9 MG/DL — SIGNIFICANT CHANGE UP (ref 1.6–2.6)
MCHC RBC-ENTMCNC: 30.7 PG — SIGNIFICANT CHANGE UP (ref 27–34)
MCHC RBC-ENTMCNC: 32 GM/DL — SIGNIFICANT CHANGE UP (ref 32–36)
MCV RBC AUTO: 96.1 FL — SIGNIFICANT CHANGE UP (ref 80–100)
MONOCYTES # BLD AUTO: 0.56 K/UL — SIGNIFICANT CHANGE UP (ref 0–0.9)
MONOCYTES NFR BLD AUTO: 7.5 % — SIGNIFICANT CHANGE UP (ref 2–14)
NEUTROPHILS # BLD AUTO: 5.65 K/UL — SIGNIFICANT CHANGE UP (ref 1.8–7.4)
NEUTROPHILS NFR BLD AUTO: 75.5 % — SIGNIFICANT CHANGE UP (ref 43–77)
NRBC # BLD: 0 /100 WBCS — SIGNIFICANT CHANGE UP (ref 0–0)
NT-PROBNP SERPL-SCNC: 76 PG/ML — SIGNIFICANT CHANGE UP (ref 0–300)
PCO2 BLDA: 38 MMHG — SIGNIFICANT CHANGE UP (ref 32–45)
PH BLDA: 7.41 — SIGNIFICANT CHANGE UP (ref 7.35–7.45)
PHOSPHATE SERPL-MCNC: 3.4 MG/DL — SIGNIFICANT CHANGE UP (ref 2.5–4.5)
PLATELET # BLD AUTO: 174 K/UL — SIGNIFICANT CHANGE UP (ref 150–400)
PO2 BLDA: 81 MMHG — LOW (ref 83–108)
POTASSIUM SERPL-MCNC: 3.6 MMOL/L — SIGNIFICANT CHANGE UP (ref 3.5–5.3)
POTASSIUM SERPL-SCNC: 3.6 MMOL/L — SIGNIFICANT CHANGE UP (ref 3.5–5.3)
PROT SERPL-MCNC: 6.7 G/DL — SIGNIFICANT CHANGE UP (ref 6–8.3)
RBC # BLD: 4.62 M/UL — SIGNIFICANT CHANGE UP (ref 3.8–5.2)
RBC # FLD: 14.7 % — HIGH (ref 10.3–14.5)
RNAP III AB SER-ACNC: 16 UNITS — SIGNIFICANT CHANGE UP (ref 0–19)
S PNEUM AG UR QL: NEGATIVE — SIGNIFICANT CHANGE UP
SAO2 % BLDA: 97.7 % — SIGNIFICANT CHANGE UP (ref 94–98)
SODIUM SERPL-SCNC: 136 MMOL/L — SIGNIFICANT CHANGE UP (ref 135–145)
WBC # BLD: 7.48 K/UL — SIGNIFICANT CHANGE UP (ref 3.8–10.5)
WBC # FLD AUTO: 7.48 K/UL — SIGNIFICANT CHANGE UP (ref 3.8–10.5)

## 2023-08-29 PROCEDURE — 99222 1ST HOSP IP/OBS MODERATE 55: CPT

## 2023-08-29 RX ORDER — POTASSIUM CHLORIDE 20 MEQ
40 PACKET (EA) ORAL ONCE
Refills: 0 | Status: COMPLETED | OUTPATIENT
Start: 2023-08-29 | End: 2023-08-29

## 2023-08-29 RX ORDER — POTASSIUM CHLORIDE 20 MEQ
40 PACKET (EA) ORAL ONCE
Refills: 0 | Status: DISCONTINUED | OUTPATIENT
Start: 2023-08-29 | End: 2023-08-29

## 2023-08-29 RX ADMIN — GABAPENTIN 100 MILLIGRAM(S): 400 CAPSULE ORAL at 06:19

## 2023-08-29 RX ADMIN — Medication 1 TABLET(S): at 13:48

## 2023-08-29 RX ADMIN — GABAPENTIN 100 MILLIGRAM(S): 400 CAPSULE ORAL at 22:31

## 2023-08-29 RX ADMIN — Medication 40 MILLIEQUIVALENT(S): at 11:18

## 2023-08-29 RX ADMIN — ENOXAPARIN SODIUM 40 MILLIGRAM(S): 100 INJECTION SUBCUTANEOUS at 06:20

## 2023-08-29 RX ADMIN — ENOXAPARIN SODIUM 40 MILLIGRAM(S): 100 INJECTION SUBCUTANEOUS at 17:48

## 2023-08-29 RX ADMIN — PANTOPRAZOLE SODIUM 40 MILLIGRAM(S): 20 TABLET, DELAYED RELEASE ORAL at 06:19

## 2023-08-29 RX ADMIN — GABAPENTIN 100 MILLIGRAM(S): 400 CAPSULE ORAL at 13:15

## 2023-08-29 NOTE — CONSULT NOTE ADULT - SUBJECTIVE AND OBJECTIVE BOX
Patient is a 62y old  Female who presents with a chief complaint of Shortness of breath (29 Aug 2023 08:14)      HPI:  This is a 62 year old female with a past medical history of afib, and sciatica, who presented to [facility] for shortness of breath. Pt states that she went to the urgent care today where she had an XR done concerning for b/l lower infiltrates, and was driven here to the ED by car from the ambulance. She states that she can only walk 12-15 feet and feels short of breath. She states that she has a pulse ox at home and had readings of 82. She states that she had chronic SOB for several months and got worked up in [place] where she had a CT scan that was negative for PE. She also states that she was seen by a pulmonologist and rheumatology, where they ruled out lupus and other immunological disorders. Pt states that she has a cough with no sputum. Pt denies any uri sxs, chest pain, pain with breathing, abdominal pain, nvd, headaches,     ED Course:  Vitals: 97.6, 97, 113/76, 18 94% 2L nc  Labs: Wbc 9.83 Hb 15.4, Hct 46.1, Plt 222, D-dimer 666, Trop T 123, VBG 7.41 CO2 29.3, RVP neg, Cov2 neg,  Imaging: CTA: no visualized PE  EKhr sinus rhythm  Consult: Cardiology (26 Aug 2023 18:38)    PAST MEDICAL & SURGICAL HISTORY:    MEDICATIONS  (STANDING):  dextrose 5%. 1000 milliLiter(s) (50 mL/Hr) IV Continuous <Continuous>  dextrose 5%. 1000 milliLiter(s) (100 mL/Hr) IV Continuous <Continuous>  dextrose 50% Injectable 25 Gram(s) IV Push once  dextrose 50% Injectable 12.5 Gram(s) IV Push once  dextrose 50% Injectable 25 Gram(s) IV Push once  enoxaparin Injectable 40 milliGRAM(s) SubCutaneous every 12 hours  gabapentin 100 milliGRAM(s) Oral three times a day  glucagon  Injectable 1 milliGRAM(s) IntraMuscular once  insulin lispro (ADMELOG) corrective regimen sliding scale   SubCutaneous Before meals and at bedtime  pantoprazole    Tablet 40 milliGRAM(s) Oral before breakfast    MEDICATIONS  (PRN):  acetaminophen     Tablet .. 650 milliGRAM(s) Oral every 6 hours PRN Temp greater or equal to 38C (100.4F), Mild Pain (1 - 3)  dextrose Oral Gel 15 Gram(s) Oral once PRN Blood Glucose LESS THAN 70 milliGRAM(s)/deciliter               Home Living Status :  lives in [facility] with her  [place] private house         -  Prior Home Care Services :  none      Baseline Functional Level Prior to Admission :             - ADL's/ IADL's :  independent          - Ambulatory status prior to admission :   walked with no assistive devices          FAMILY HISTORY:      CBC Full  -  ( 29 Aug 2023 05:30 )  WBC Count : 7.48 K/uL  RBC Count : 4.62 M/uL  Hemoglobin : 14.2 g/dL  Hematocrit : 44.4 %  Platelet Count - Automated : 174 K/uL  Mean Cell Volume : 96.1 fl  Mean Cell Hemoglobin : 30.7 pg  Mean Cell Hemoglobin Concentration : 32.0 gm/dL  Auto Neutrophil # : 5.65 K/uL  Auto Lymphocyte # : 1.11 K/uL  Auto Monocyte # : 0.56 K/uL  Auto Eosinophil # : 0.09 K/uL  Auto Basophil # : 0.02 K/uL  Auto Neutrophil % : 75.5 %  Auto Lymphocyte % : 14.8 %  Auto Monocyte % : 7.5 %  Auto Eosinophil % : 1.2 %  Auto Basophil % : 0.3 %          136  |  102  |  15  ----------------------------<  120<H>  3.6   |  24  |  0.80    Ca    9.2      29 Aug 2023 05:30  Phos  3.4       Mg     1.9         TPro  6.7  /  Alb  3.1<L>  /  TBili  0.7  /  DBili  x   /  AST  54<H>  /  ALT  61<H>  /  AlkPhos  58        Urinalysis Basic - ( 29 Aug 2023 05:30 )    Color: x / Appearance: x / SG: x / pH: x  Gluc: 120 mg/dL / Ketone: x  / Bili: x / Urobili: x   Blood: x / Protein: x / Nitrite: x   Leuk Esterase: x / RBC: x / WBC x   Sq Epi: x / Non Sq Epi: x / Bacteria: x        Radiology :     < from: CT Angio Chest PE Protocol w/ IV Cont (23 @ 15:10) >  ACC: [id number] EXAM:  CT ANGIO CHEST PULM ART [facility]   ORDERED BY:   COLT SUÁREZ     PROCEDURE DATE:  2023          INTERPRETATION:  CONTRAST/COMPLICATIONS:  IV Contrast: Isovue 370  154 cc administered   46 cc discarded  Oral Contrast: NONE  Complications: None reported at time of study completion    CTA (CT angiography) of the CHEST dated 2023 3:10 PM    INDICATION: dyspnea, recent travel r/o PE    TECHNIQUE: CT angiography of the chest was performed during bolus   injection of intravenous contrast.  Post-processing including the   production of axial and coronal and sagittal multiplanar reformatted   images and coronal maximum intensity projections (MIPs) was performed.    PRIOR STUDY: None.    FINDINGS: No visualized PE.The pulmonary trunk measures 3.3 cm diameter.     The heart is enlarged.     Trace left lateral asymmetric pericardial effusion is seen.     The great vessels are unremarkable. Left vertebral artery arises   directly from the aortic arch.     No mediastinal or hilar lymphadenopathy is seen.    Mediastinal lipomatosis.    Evaluation of the pulmonary parenchyma demonstrates bilateral   interstitial lung disease in the upper, mid and lower zones with   peripheral groundglass opacities and reticulation. Less involvement of   the bilateral upper zones. No traction bronchiectasis or honeycombing.    Bilateral air trapping.     No pleural effusions are seen.    Limited evaluation of the upper abdomen demonstrates 1.6 cm hypodensity   in segment one likely cyst. Small to moderate hiatal hernia.    Evaluation of the osseous structures demonstrates no significant   abnormality.      IMPRESSION:  No visualized PE.    Bilateral interstitial lung disease. Differential diagnosis includes  HP,NSIP, atypical pneumonia.             Review of Systems : per HPI         Vital Signs Last 24 Hrs  T(C): 36.7 (29 Aug 2023 05:35), Max: 37 (28 Aug 2023 13:45)  T(F): 98.1 (29 Aug 2023 05:35), Max: 98.6 (28 Aug 2023 13:45)  HR: 81 (29 Aug 2023 05:35) (81 - 95)  BP: 111/66 (29 Aug 2023 05:35) (111/66 - 147/86)  BP(mean): --  RR: 17 (29 Aug 2023 05:35) (17 - 18)  SpO2: 96% (29 Aug 2023 05:35) (92% - 96%)    Parameters below as of 29 Aug 2023 05:35  Patient On (Oxygen Delivery Method): nasal cannula  O2 Flow (L/min): 4          Physical Exam :  obese 62 y o woman lying comfortably in semi Pandya's position , awake , alert , no acute complaints , feels tired     Head : normocephalic , atraumatic    Eyes : PERRLA , EOMI , no nystagmus , sclera anicteric    ENT / FACE : neg nasal discharge , uvula midline , no oropharyngeal erythema / exudate    Neck : supple , negative JVD , negative carotid bruits , no thyromegaly    Chest : CTA bilaterally     Cardiovascular : regular rate and rhythm , neg murmurs / rubs / gallops    Abdomen : soft , non distended , non tender to palpation in all 4 quadrants ,  normal bowel sounds     Extremities : WWP , neg cyanosis /clubbing / edema     Neurologic Exam :    Alert and oriented  x 3     Motor Exam:          Right UE:               no focal weakness ,  > 4/5 throughout     Left UE:                 no focal weakness ,  > 4/5 throughout         Right LE:    no focal weakness ,  > 4/5 throughout        Left LE:    no focal weakness ,  > 4/5 throughout         Sensation :         intact to light touch x 4 extremities       DTR :     biceps/brachioradialis : equal                      patella/ankle : equal        Gait :  not tested          PM&R Impression :     admitted for c/o  shortness of breath found to have tropinemia and interstitial lung disease admitted for hypoxia    - deconditioned    - no focal weakness       Recommendations / Plan :      1) Physical / Occupational therapy focusing on therapeutic exercises , equipment evaluation , bed mobility/transfer out of bed evaluation , progressive ambulation with assistive devices prn .    2) Current disposition plan recommendation  :    pending functional progress

## 2023-08-29 NOTE — CONSULT NOTE ADULT - ASSESSMENT
I M    62 year old female with a past medical history of afib, and sciatica, who presented to [facility] for shortness of breath found to have tropinemia and interstitial lung disease admitted for hypoxia.    Problem/Plan - 1:  ·  Problem: Dyspnea on exertion.   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 RVP, cov negative. Hb and Hct elevated in setting of  chronic sob  CXR: pulmonary infiltrates  CTA: Bilateral interstitial lung disease. Differential diagnosis includes HP, NSIP, atypical pneumonia.    Possibly idiopathic pulmonary fibrosis with chronic duration given CTA findings and CXR. 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 Symptoms have not improved with prednisone 20mg BID, may be less likely ILD secondary to rheumatological causes. Less likely interstitial pneumonia given asymptomatic and wbc wnl. Physical exam not notable for connective tissue diseases.   Procal 0.09  8/28: patient O2 requirement 3L NC -> 4L NC    - f/u pulm consult  - f/u high resolution ct chest  - f/u legionella and strep  - prednisone 40mg qD  - incentive spirometer  - wean o2.    Problem/Plan - 2:  ·  Problem: Demand ischemia.   ·  Plan: No acs sxs. Elevated troponin, downtrending. In the setting of chronic sob and dyspnea on exertion. BNP wnl  A1c 6.1  Cholesterol 152, triglycerides 104, HDL 32, LDL 99  TTE:  1. Normal left and right ventricular size and systolic function.  2. Normal atria.  3. No significant valvular disease.  4. No evidence of pulmonary hypertension.  5. No pericardial effusion.  6. No prior echo is available for comparison.    - f/c cardiology rec - recommending work up for ILD in patient with normal TTE outpatient in June.    Problem/Plan - 3:  ·  Problem: Elevated troponin.   ·  Plan: Trops downtrending. Troponin may be elevated in the setting of demand ischemia. Pt has history of chronic SOB. BNP wnl    - f/u cardiology rec  - continue to monitor symptoms.    Problem/Plan - 4:  ·  Problem: Ground glass opacity present on imaging of lung.   ·  Plan: CXR revealed ground glass opacity. CTA revealed interstitial lung disease.  Procal 0.09  Anti- SSA 0.4, SSB <0.2, rheumatoid factor quant <10, CCP (-), centromere ab <0.2, anti-ribonuclear >8.0    - f/u rheumatologic workup: ANCA, ARIANA, anti-dsDNA, complement levels, myomarker panel, and systemic sclerosis panel  - f/u hypersensitivity pneumonitis panel  - continue to monitor sxs.    Problem/Plan - 5:  ·  Problem: Transaminitis.   ·  Plan: AST 58, ALT 71. May be elevated in the setting of decreased perfusion. Cr wnl  Hep panel non-reactive     - trend cmp daily.    Problem/Plan - 6:  ·  Problem: Afib.   ·  Plan: Pt states she had afib 1 month ago. Has not been taking medications.    - not currently in afib  - consider lopressor 12.5.    Problem/Plan - 7:  ·  Problem: Sciatica.   ·  Plan: Home meds prednisone 20mg BID, gabapentin 100g TID.    - c/w home meds.    Problem/Plan - 8:  ·  Problem: Prophylactic measure.   ·  Plan: F: none given  E: If k>4 or mag>2 replete prn  N: dash diet  G: protonix  DVT: lovenox  Dispo; 7uris.

## 2023-08-29 NOTE — PROGRESS NOTE ADULT - SUBJECTIVE AND OBJECTIVE BOX
Cardiology Consult    O/N:  Interval History/HPI: Pt seen and examined at bedside, NAD, no complaints at this time. ROS s/f ?, remainder of ROS otherwise unremarkable   Telemetry:    OBJECTIVE  T(C): 36.7 (08-29-23 @ 05:35), Max: 37 (08-28-23 @ 13:45)  HR: 81 (08-29-23 @ 05:35) (81 - 95)  BP: 111/66 (08-29-23 @ 05:35) (111/66 - 147/86)  RR: 17 (08-29-23 @ 05:35) (17 - 18)  SpO2: 96% (08-29-23 @ 05:35) (92% - 96%)      PHYSICAL EXAM:    Constitutional: resting comfortably in bed; NAD  HEENT: NC/AT, PERRL, EOMI, anicteric sclera, no nasal discharge; uvula midline, no oropharyngeal erythema or exudates; MMM  Neck: supple; no thyromegaly, JVP ? cm H20, JVD +/-  Respiratory: CTA B/L; no W/R/R, no retractions  Cardiac: +S1/S2; RRR; no M/R/G; PMI non-displaced  Gastrointestinal: soft, NT/ND; no rebound or guarding; +BSx4  Extremities: WWP, no clubbing or cyanosis; no peripheral edema  Musculoskeletal: NROM x4; no joint swelling, tenderness or erythema  Vascular: 2+ radial, DP/PT pulses B/L  Dermatologic: skin warm, dry and intact; no rashes, wounds, or scars  Lymphatic: no submandibular or cervical LAD  Neurologic: AAOx3; CNII-XII grossly intact; no focal deficits    LABS:                        14.2   7.48  )-----------( 174      ( 29 Aug 2023 05:30 )             44.4     08-29    136  |  102  |  15  ----------------------------<  120<H>  3.6   |  24  |  0.80    Ca    9.2      29 Aug 2023 05:30  Phos  3.4     08-29  Mg     1.9     08-29    TPro  6.7  /  Alb  3.1<L>  /  TBili  0.7  /  DBili  x   /  AST  54<H>  /  ALT  61<H>  /  AlkPhos  58  08-29      Urinalysis Basic - ( 29 Aug 2023 05:30 )    Color: x / Appearance: x / SG: x / pH: x  Gluc: 120 mg/dL / Ketone: x  / Bili: x / Urobili: x   Blood: x / Protein: x / Nitrite: x   Leuk Esterase: x / RBC: x / WBC x   Sq Epi: x / Non Sq Epi: x / Bacteria: x        RADIOLOGY & ADDITIONAL TESTS:  Reviewed .    MEDICATIONS  (STANDING):  dextrose 5%. 1000 milliLiter(s) (50 mL/Hr) IV Continuous <Continuous>  dextrose 5%. 1000 milliLiter(s) (100 mL/Hr) IV Continuous <Continuous>  dextrose 50% Injectable 25 Gram(s) IV Push once  dextrose 50% Injectable 12.5 Gram(s) IV Push once  dextrose 50% Injectable 25 Gram(s) IV Push once  enoxaparin Injectable 40 milliGRAM(s) SubCutaneous every 12 hours  gabapentin 100 milliGRAM(s) Oral three times a day  glucagon  Injectable 1 milliGRAM(s) IntraMuscular once  insulin lispro (ADMELOG) corrective regimen sliding scale   SubCutaneous Before meals and at bedtime  pantoprazole    Tablet 40 milliGRAM(s) Oral before breakfast    MEDICATIONS  (PRN):  acetaminophen     Tablet .. 650 milliGRAM(s) Oral every 6 hours PRN Temp greater or equal to 38C (100.4F), Mild Pain (1 - 3)  dextrose Oral Gel 15 Gram(s) Oral once PRN Blood Glucose LESS THAN 70 milliGRAM(s)/deciliter

## 2023-08-29 NOTE — PROGRESS NOTE ADULT - SUBJECTIVE AND OBJECTIVE BOX
PULMONARY CONSULT SERVICE FOLLOW-UP NOTE    INTERVAL HPI:  Reviewed chart and overnight events; patient seen and examined at bedside.    MEDICATIONS:  Pulmonary:    Antimicrobials:    Anticoagulants:  enoxaparin Injectable 40 milliGRAM(s) SubCutaneous every 12 hours    Cardiac:      Allergies    No Known Allergies    Intolerances        Vital Signs Last 24 Hrs  T(C): 36.7 (29 Aug 2023 05:35), Max: 37 (28 Aug 2023 13:45)  T(F): 98.1 (29 Aug 2023 05:35), Max: 98.6 (28 Aug 2023 13:45)  HR: 81 (29 Aug 2023 05:35) (81 - 95)  BP: 111/66 (29 Aug 2023 05:35) (111/66 - 147/86)  BP(mean): --  RR: 17 (29 Aug 2023 05:35) (17 - 18)  SpO2: 96% (29 Aug 2023 05:35) (92% - 96%)    Parameters below as of 29 Aug 2023 05:35  Patient On (Oxygen Delivery Method): nasal cannula  O2 Flow (L/min): 4          PHYSICAL EXAM:  Constitutional: NAD  HEENT: NC/AT; PERRL, anicteric sclera; MMM  Neck: supple  Cardiovascular: +S1/S2, RRR  Respiratory: CTA B/L; no W/R/R  Gastrointestinal: soft, NT/ND  Extremities: WWP; no edema, clubbing or cyanosis  Vascular: 2+ radial pulses B/L  Neurological: awake and alert; WRIGHT    LABS:      CBC Full  -  ( 29 Aug 2023 05:30 )  WBC Count : 7.48 K/uL  RBC Count : 4.62 M/uL  Hemoglobin : 14.2 g/dL  Hematocrit : 44.4 %  Platelet Count - Automated : 174 K/uL  Mean Cell Volume : 96.1 fl  Mean Cell Hemoglobin : 30.7 pg  Mean Cell Hemoglobin Concentration : 32.0 gm/dL  Auto Neutrophil # : 5.65 K/uL  Auto Lymphocyte # : 1.11 K/uL  Auto Monocyte # : 0.56 K/uL  Auto Eosinophil # : 0.09 K/uL  Auto Basophil # : 0.02 K/uL  Auto Neutrophil % : 75.5 %  Auto Lymphocyte % : 14.8 %  Auto Monocyte % : 7.5 %  Auto Eosinophil % : 1.2 %  Auto Basophil % : 0.3 %    08-29    136  |  102  |  15  ----------------------------<  120<H>  3.6   |  24  |  0.80    Ca    9.2      29 Aug 2023 05:30  Phos  3.4     08-29  Mg     1.9     08-29    TPro  6.7  /  Alb  3.1<L>  /  TBili  0.7  /  DBili  x   /  AST  54<H>  /  ALT  61<H>  /  AlkPhos  58  08-29          Urinalysis Basic - ( 29 Aug 2023 05:30 )    Color: x / Appearance: x / SG: x / pH: x  Gluc: 120 mg/dL / Ketone: x  / Bili: x / Urobili: x   Blood: x / Protein: x / Nitrite: x   Leuk Esterase: x / RBC: x / WBC x   Sq Epi: x / Non Sq Epi: x / Bacteria: x                RADIOLOGY & ADDITIONAL STUDIES: PULMONARY CONSULT SERVICE FOLLOW-UP NOTE    INTERVAL HPI:  Reviewed chart and overnight events; patient seen and examined at bedside.    MEDICATIONS:  Pulmonary:    Antimicrobials:    Anticoagulants:  enoxaparin Injectable 40 milliGRAM(s) SubCutaneous every 12 hours    Cardiac:      Allergies    No Known Allergies    Intolerances        Vital Signs Last 24 Hrs  T(C): 36.7 (29 Aug 2023 05:35), Max: 37 (28 Aug 2023 13:45)  T(F): 98.1 (29 Aug 2023 05:35), Max: 98.6 (28 Aug 2023 13:45)  HR: 81 (29 Aug 2023 05:35) (81 - 95)  BP: 111/66 (29 Aug 2023 05:35) (111/66 - 147/86)  BP(mean): --  RR: 17 (29 Aug 2023 05:35) (17 - 18)  SpO2: 96% (29 Aug 2023 05:35) (92% - 96%)    Parameters below as of 29 Aug 2023 05:35  Patient On (Oxygen Delivery Method): nasal cannula  O2 Flow (L/min): 4          PHYSICAL EXAM:  Constitutional: NAD  HEENT: NC/AT; PERRL, anicteric sclera; MMM  Neck: supple  Cardiovascular: +S1/S2, RRR  Respiratory: b/l crackles  Gastrointestinal: soft, NT/ND  Extremities: WWP; no edema, clubbing or cyanosis  Vascular: 2+ radial pulses B/L  Neurological: awake and alert; WRIGHT    LABS:      CBC Full  -  ( 29 Aug 2023 05:30 )  WBC Count : 7.48 K/uL  RBC Count : 4.62 M/uL  Hemoglobin : 14.2 g/dL  Hematocrit : 44.4 %  Platelet Count - Automated : 174 K/uL  Mean Cell Volume : 96.1 fl  Mean Cell Hemoglobin : 30.7 pg  Mean Cell Hemoglobin Concentration : 32.0 gm/dL  Auto Neutrophil # : 5.65 K/uL  Auto Lymphocyte # : 1.11 K/uL  Auto Monocyte # : 0.56 K/uL  Auto Eosinophil # : 0.09 K/uL  Auto Basophil # : 0.02 K/uL  Auto Neutrophil % : 75.5 %  Auto Lymphocyte % : 14.8 %  Auto Monocyte % : 7.5 %  Auto Eosinophil % : 1.2 %  Auto Basophil % : 0.3 %    08-29    136  |  102  |  15  ----------------------------<  120<H>  3.6   |  24  |  0.80    Ca    9.2      29 Aug 2023 05:30  Phos  3.4     08-29  Mg     1.9     08-29    TPro  6.7  /  Alb  3.1<L>  /  TBili  0.7  /  DBili  x   /  AST  54<H>  /  ALT  61<H>  /  AlkPhos  58  08-29          Urinalysis Basic - ( 29 Aug 2023 05:30 )    Color: x / Appearance: x / SG: x / pH: x  Gluc: 120 mg/dL / Ketone: x  / Bili: x / Urobili: x   Blood: x / Protein: x / Nitrite: x   Leuk Esterase: x / RBC: x / WBC x   Sq Epi: x / Non Sq Epi: x / Bacteria: x                RADIOLOGY & ADDITIONAL STUDIES:

## 2023-08-29 NOTE — PHYSICAL THERAPY INITIAL EVALUATION ADULT - LIVES WITH, PROFILE
partner (Kiara) in both [place] and [facility]; Patient has one home in [facility] (stairs to negotiate) and an apartment in [facility] (elevator access); patient reports she will be staying in elevator apartment upon discharge from [facility]

## 2023-08-29 NOTE — PHYSICAL THERAPY INITIAL EVALUATION ADULT - GAIT DEVIATIONS NOTED, PT EVAL
appropriate tomas/step length, steady gait, no LOB/knee buckling noted; good negotiation through hallway obstacles without gait disturbances noted

## 2023-08-29 NOTE — CONSULT NOTE ADULT - ASSESSMENT
Presenting in acute respiratory failure, with imaging should bilateral reticulations and ground glass opacities. Given reticular pattern, underlying interstitial lung disease is a concern, however, the presence of ground glass favors a diagnosis alternative to UIP (usual interstitial pneumonia) vs NSIP. There is also a possibility that she has some level of cellular HP (hypersensitivity pneumonitis) given some geographic correlation with her symptoms, suggesting some antigen she is being exposed to exacerbating her symptoms.    Recommend:   -Will discuss with IP for possible bronchoscopy with BAL ([facility] location) on Wednesday  -Ordered HRCT with inspiratory, expiratory, with supine and prone images to further characterize ILD  -F/u rheumatologic workup with ANCA,  sjogren antibodies, ARIANA, anti-dsDNA, complement levels, RF, centromere abs, anti CCP, anti RNP, myomarker panel and systemic sclerosis panel  -F/u hypersensitivity pneumonitis panel  -TTE  -ABG, then hyperventilated for 1 minute, and then repeat ABG right after.    -Uploaded prior CT chest from 6/2023 that looks essentially normal.  -PT evaluation and document ambulatory saturation if possible; may require establishment of home oxygen   -Nutrition counseling and weight loss  -Would hold any steroids at the moment so as to increase yield of diagnostic studies, unless patient becomes acutely hypoxic  -Will require dedicated outpatient pulmonary follow up with formal PFTs, 6MWT. 62 year old female with a past medical history of afib, and sciatica, who presented to [facility] for shortness of breath. Pulmonary consulted for acute hypoxic respiratory failure.     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 Given reticular pattern, in particular in a subpleural pattern with a cranial caudal distrubution, GGOs, and arcade-like sign- this constellation of CT chest findings noted on HRCT favors mainly diagnoses of organizing pneumonia and NSIP 2/2 CTD (highly elevated anti-RNP). Patient may have certainly presented with ILD prior to rheumatological manifestations of MCTD.     Recommend:  -Start prednisone 40mg qd and bactrim PJP ppx (eg, double strength 1 tablet daily)  -IgG and IgM SARS COV-2  -Rheum consult recommended with capillaroscopy  -Will discuss with IP for possible bronchoscopy with BAL ([facility] location) on Wednesday  -F/u rheumatologic workup with ANCA, sjogren antibodies (neg), ARIANA, anti-dsDNA, complement levels (neg), RF, centromere abs (neg), anti-CCP (neg), anti RNP (elevated; >8), myomarker panel, and systemic sclerosis panel  -F/u hypersensitivity pneumonitis panel  -TTE wnl  -ABG, then hyperventilated for 1 minute, and then repeat ABG right after.    -Uploaded prior CT chest from 6/2023 that looks essentially normal.  -PT evaluation and document ambulatory saturation if possible; may require establishment of home oxygen   -Nutrition counseling and weight loss  -Would hold any steroids at the moment so as to increase yield of diagnostic studies, unless patient becomes acutely hypoxic  -Will require dedicated outpatient pulmonary follow up with formal PFTs, 6MWT. 62 year old female with a past medical history of afib, and sciatica, who presented to [facility] for shortness of breath. Pulmonary consulted for acute hypoxic respiratory failure.   My assessment is as follows:  1. Clinical  In a middle aged lady, who is a non-smoker and who has no family history of fibrotic lung disease, the differential diagnosis is:  --Autoimmune related ILD  --Hypersensitivity pneumonitis  --Drug induced ILD  --Post-Covid  --Organizing pneumonia  2. Radiographic  --Minimal reticulation  --Ground glass opacities and consolidation in sub-pleural areas with arcade pattern  This pattern is consistent with organizing pneumonia. The pattern is consistent with an alternate diagnosis to UIP  Putting the clinical and radiological picture together, I suspect that the patient has an autoimmune disease such as mixed connective tissue disease which is resulting in organizing pneumonia.  An injury or insult like covid may cause auto-immunity to flare up. Awaiting SARS CoV2 antibody results.    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 Given reticular pattern, in particular in a subpleural pattern with a cranial caudal distrubution, GGOs/consolidation, and arcade pattern- this constellation of CT chest findings noted on HRCT favors mainly diagnoses of organizing pneumonia possibly due to CTD (highly elevated anti-RNP). Patient may have certainly presented with ILD prior to rheumatological manifestations of MCTD.     Recommend:  -Start prednisone 40mg qd and bactrim PJP ppx (eg, double strength 1 tablet daily)-only if anti polymerase 3 antibodies are negative  -IgG and IgM SARS COV-2  -Rheum consult recommended with capillaroscopy  -Will discuss with IP for possible bronchoscopy with BAL ([facility] location) on Wednesday  -F/u rheumatologic workup with ANCA, sjogren antibodies (neg), ARIANA, anti-dsDNA, complement levels (neg), RF, centromere abs (neg), anti-CCP (neg), anti RNP (elevated; >8), myomarker panel, and systemic sclerosis panel  -F/u hypersensitivity pneumonitis panel  -TTE wnl  -ABG, then hyperventilated for 1 minute, and then repeat ABG right after.    -Uploaded prior CT chest from 6/2023 that looks essentially normal.  -PT evaluation and document ambulatory saturation if possible; may require establishment of home oxygen   -Nutrition counseling and weight loss  -Would hold any steroids at the moment so as to increase yield of diagnostic studies, unless patient becomes acutely hypoxic  -Will require dedicated outpatient pulmonary follow up with formal PFTs, 6MWT. Discharged

## 2023-08-29 NOTE — PROGRESS NOTE ADULT - ASSESSMENT
62 year old female with a past medical history of afib, and sciatica, who presented to [facility] for shortness of breath found to have tropinemia and interstitial lung disease admitted for hypoxia.

## 2023-08-29 NOTE — PROGRESS NOTE ADULT - PROBLEM SELECTOR PLAN 1
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 RVP, cov negative. Hb and Hct elevated in setting of  chronic sob  CXR: pulmonary infiltrates  CTA: Bilateral interstitial lung disease. Differential diagnosis includes HP, NSIP, atypical pneumonia.    Possibly idiopathic pulmonary fibrosis with chronic duration given CTA findings and CXR. 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 Symptoms have not improved with prednisone 20mg BID, may be less likely ILD secondary to rheumatological causes. Less likely interstitial pneumonia given asymptomatic and wbc wnl. Physical exam not notable for connective tissue diseases.   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 RVP, cov negative. Hb and Hct elevated in setting of  chronic sob  CXR: pulmonary infiltrates  CTA: Bilateral interstitial lung disease. Differential diagnosis includes HP, NSIP, atypical pneumonia.    Possibly idiopathic pulmonary fibrosis with chronic duration given CTA findings and CXR. 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 Symptoms have not improved with prednisone 20mg BID, may be less likely ILD secondary to rheumatological causes. Less likely interstitial pneumonia given asymptomatic and wbc wnl. Physical exam not notable for connective tissue diseases.   Procal 0.09  8/28: patient O2 requirement 3L NC -> 4L NC  High resolution CT findings in problem 4 ground glass below    - f/u pulm consult  - f/u legionella and strep  - holding prednisone 40mg qD  - incentive spirometer  - wean o2 as appropriate

## 2023-08-29 NOTE — PROGRESS NOTE ADULT - ASSESSMENT
Ms. Hager is a 62 year old female with a past medical history of afib, and sciatica, who presented to [facility] for shortness of breath. Pulmonary consulted for acute hypoxic respiratory failure.     #Acute Hypoxic Respiratory Failure   #R/O ILD    Data Reviewed:   CT Chest 8/27/23:   Labwork: No peripheral eosinophilia, rheumatologic workup pending  Collateral from [facility] ([place]) workup: CT Chest report 6/2023 (Lingular GGO and scarring of right lung base), no sig labwork obtained (no rheumatologic studies available), no PE on CT angio performed in late June 2023

## 2023-08-29 NOTE — PHYSICAL THERAPY INITIAL EVALUATION ADULT - THERAPY FREQUENCY, PT EVAL
Patient educated on discharge from inpatient physical therapy at [facility], patient verbalized understanding.

## 2023-08-29 NOTE — PROGRESS NOTE ADULT - SUBJECTIVE AND OBJECTIVE BOX
O/N Events: no acute events overnight    Subjective/ROS: Patient seen and examined at bedside. Patient states that she feels that her oxygen level is better than yesterday. Patient has personal pulse oximeter at bedside. She states that the numbness and coldness she felt in her toes yesterday has resolved and attributes that to better oxygenation.     Denies Fever/Chills, HA, CP, n/v, changes in bowel/urinary habits.  12pt ROS otherwise negative.    VITALS  Vital Signs Last 24 Hrs  T(C): 36.7 (29 Aug 2023 05:35), Max: 36.9 (28 Aug 2023 21:02)  T(F): 98.1 (29 Aug 2023 05:35), Max: 98.5 (28 Aug 2023 21:02)  HR: 81 (29 Aug 2023 05:35) (81 - 87)  BP: 111/66 (29 Aug 2023 05:35) (111/66 - 111/75)  BP(mean): --  RR: 17 (29 Aug 2023 05:35) (17 - 18)  SpO2: 96% (29 Aug 2023 05:35) (92% - 96%)    Parameters below as of 29 Aug 2023 05:35  Patient On (Oxygen Delivery Method): nasal cannula  O2 Flow (L/min): 4      CAPILLARY BLOOD GLUCOSE      POCT Blood Glucose.: 122 mg/dL (29 Aug 2023 12:10)  POCT Blood Glucose.: 90 mg/dL (29 Aug 2023 08:41)  POCT Blood Glucose.: 114 mg/dL (28 Aug 2023 22:07)  POCT Blood Glucose.: 132 mg/dL (28 Aug 2023 17:50)      PHYSICAL EXAM  General: NAD  Head: NC/AT; MMM; PERRL; EOMI;  Neck: Supple; no JVD  Respiratory: crackles in BL lung bases  Cardiovascular: Regular rhythm/rate; S1/S2+, no murmurs  Gastrointestinal: Soft; NTND  Extremities: WWP; BL lymphedema of LE  Neurological: A&Ox3, no obvious focal deficits    MEDICATIONS  (STANDING):  dextrose 5%. 1000 milliLiter(s) (50 mL/Hr) IV Continuous <Continuous>  dextrose 5%. 1000 milliLiter(s) (100 mL/Hr) IV Continuous <Continuous>  dextrose 50% Injectable 25 Gram(s) IV Push once  dextrose 50% Injectable 12.5 Gram(s) IV Push once  dextrose 50% Injectable 25 Gram(s) IV Push once  enoxaparin Injectable 40 milliGRAM(s) SubCutaneous every 12 hours  gabapentin 100 milliGRAM(s) Oral three times a day  glucagon  Injectable 1 milliGRAM(s) IntraMuscular once  insulin lispro (ADMELOG) corrective regimen sliding scale   SubCutaneous Before meals and at bedtime  pantoprazole    Tablet 40 milliGRAM(s) Oral before breakfast  trimethoprim  160 mG/sulfamethoxazole 800 mG 1 Tablet(s) Oral daily    MEDICATIONS  (PRN):  acetaminophen     Tablet .. 650 milliGRAM(s) Oral every 6 hours PRN Temp greater or equal to 38C (100.4F), Mild Pain (1 - 3)  dextrose Oral Gel 15 Gram(s) Oral once PRN Blood Glucose LESS THAN 70 milliGRAM(s)/deciliter      No Known Allergies      LABS                        14.2   7.48  )-----------( 174      ( 29 Aug 2023 05:30 )             44.4     08-29    136  |  102  |  15  ----------------------------<  120<H>  3.6   |  24  |  0.80    Ca    9.2      29 Aug 2023 05:30  Phos  3.4     08-29  Mg     1.9     08-29    TPro  6.7  /  Alb  3.1<L>  /  TBili  0.7  /  DBili  x   /  AST  54<H>  /  ALT  61<H>  /  AlkPhos  58  08-29      Urinalysis Basic - ( 29 Aug 2023 05:30 )    Color: x / Appearance: x / SG: x / pH: x  Gluc: 120 mg/dL / Ketone: x  / Bili: x / Urobili: x   Blood: x / Protein: x / Nitrite: x   Leuk Esterase: x / RBC: x / WBC x   Sq Epi: x / Non Sq Epi: x / Bacteria: x              IMAGING/EKG/ETC

## 2023-08-29 NOTE — PROGRESS NOTE ADULT - PROBLEM SELECTOR PLAN 2
No acs sxs. Elevated troponin, downtrending. In the setting of chronic sob and dyspnea on exertion. BNP wnl  A1c 6.1  Cholesterol 152, triglycerides 104, HDL 32, LDL 99  TTE:  1. Normal left and right ventricular size and systolic function.  2. Normal atria.  3. No significant valvular disease.  4. No evidence of pulmonary hypertension.  5. No pericardial effusion.  6. No prior echo is available for comparison.    - f/c cardiology rec - recommending work up for ILD in patient with normal TTE outpatient in June.

## 2023-08-29 NOTE — PROGRESS NOTE ADULT - PROBLEM SELECTOR PLAN 1
Presenting in acute respiratory failure, with imaging showing bilateral reticulations and ground glass opacities. Prior CT from 6/2023 unremarkable. Rheumatologic workup shows positive anti-RNP. HRCT consistent with NSIP. 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     Recommend:   - Will discuss plan with Dr. Whitney  - No need for bronchoscopy at this time  - F/u remaining rheumatologic workup: ARIANA, anti-dsDNA, complement levels, myomarker panel and systemic sclerosis panel  - F/u hypersensitivity pneumonitis panel  - TTE  - ABG, then hyperventilated for 1 minute, and then repeat ABG right after.    - Document ambulatory saturation if possible; may require establishment of home oxygen   - Nutrition counseling and weight loss  - Will require dedicated outpatient pulmonary follow up with formal PFTs, 6MWT. Presenting in acute respiratory failure, with imaging showing bilateral reticulations and ground glass opacities. Prior CT from 6/2023 unremarkable. TTE unremarkable. Rheumatologic workup shows positive anti-RNP. HRCT consistent with NSIP. 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     Recommend:   - Will discuss plan with Dr. Whitney  - No need for bronchoscopy at this time  - F/u remaining rheumatologic workup: ARIANA, anti-dsDNA, complement levels, myomarker panel and systemic sclerosis panel  - F/u hypersensitivity pneumonitis panel  - ABG, then hyperventilated for 1 minute, and then repeat ABG right after.    - Document ambulatory saturation if possible; may require establishment of home oxygen   - Nutrition counseling and weight loss  - Will require dedicated outpatient pulmonary follow up with formal PFTs, 6MWT.

## 2023-08-29 NOTE — PHYSICAL THERAPY INITIAL EVALUATION ADULT - GENERAL OBSERVATIONS, REHAB EVAL
PT IE completed. Chart reviewed. Patient without complaints of pain at rest, agreeable to PT. Patient received semi-supine, NAD, +5LO2NC, +IV hep SADIE fournier cleared patient for treatment.

## 2023-08-29 NOTE — PHYSICAL THERAPY INITIAL EVALUATION ADULT - GAIT DISTANCE, PT EVAL
~200 feet x 1 (initially SpO2 noted 85% on 5LO2NC; SpO2 noted to desat to 77% on room air after ~100 feet and recovered to only ~87% on 5LO2NC after 1 minute seated rest and deep breathing therex; MD Bartholomew ([facility] Team 2) made aware)

## 2023-08-29 NOTE — PHYSICAL THERAPY INITIAL EVALUATION ADULT - PERTINENT HX OF CURRENT PROBLEM, REHAB EVAL
This is a 62 year old female with a past medical history of afib, and sciatica, who presented to [facility] for shortness of breath. Pt states that she went to the urgent care today where she had an XR done concerning for b/l lower infiltrates, and was driven here to the ED by car from the ambulance. She states that she can only walk 12-15 feet and feels short of breath. She states that she has a pulse ox at home and had readings of 82. She states that she had chronic SOB for several months and got worked up in [place] where she had a CT scan that was negative for PE. She also states that she was seen by a pulmonologist and rheumatology, where they ruled out lupus and other immunological disorders. Pt states that she has a cough with no sputum. Pt denies any uri sxs, chest pain, pain with breathing, abdominal pain, nvd, headaches. Please refer to H&P on [place] for remaining.

## 2023-08-29 NOTE — PROGRESS NOTE ADULT - PROBLEM SELECTOR PLAN 3
Trops downtrending. Troponin may be elevated in the setting of demand ischemia. Pt has history of chronic SOB. BNP wnl    - f/u cardiology rec  - continue to monitor symptoms

## 2023-08-29 NOTE — PROGRESS NOTE ADULT - PROBLEM SELECTOR PLAN 4
CXR revealed ground glass opacity. CTA revealed interstitial lung disease.  Procal 0.09  Anti- SSA 0.4, SSB <0.2, rheumatoid factor quant <10, CCP (-), centromere ab <0.2, anti-ribonuclear >8.0    - f/u rheumatologic workup: ANCA, ARIANA, anti-dsDNA, complement levels, myomarker panel, and systemic sclerosis panel  - f/u hypersensitivity pneumonitis panel  - continue to monitor sxs CXR revealed ground glass opacity. CTA revealed interstitial lung disease.  Procal 0.09  Anti- SSA 0.4, SSB <0.2, rheumatoid factor quant <10, CCP (-), centromere ab <0.2, anti-ribonuclear >8.0  High resolution CT:  1. Since August 26, 2023, again interstitial lung disease non-UIP pattern. Although no subpleural sparing, possibly interstitial pneumonia, differential includes NSIP associated with connective tissue disorders, chronic HP or drug toxicity.    - f/u rheumatologic workup: ANCA, ARIANA, anti-dsDNA, complement levels, myomarker panel, and systemic sclerosis panel  - f/u hypersensitivity pneumonitis panel  - continue to monitor sxs

## 2023-08-29 NOTE — CONSULT NOTE ADULT - ATTENDING COMMENTS
My assessment is as follows:  1. Clinical  In a middle aged lady, who is a non-smoker and who has no family history of fibrotic lung disease, the differential diagnosis is:  --Autoimmune related ILD  --Hypersensitivity pneumonitis  --Drug induced ILD  --Post-Covid  --Organizing pneumonia  2. Radiographic  --Minimal reticulation  --Ground glass opacities and consolidation in sub-pleural areas with arcade pattern  This pattern is consistent with organizing pneumonia. The pattern is consistent with an alternate diagnosis to UIP  Putting the clinical and radiological picture together, I suspect that the patient has an autoimmune disease such as mixed connective tissue disease which is resulting in organizing pneumonia.  An injury or insult like covid may cause auto-immunity to flare up. Awaiting SARS CoV2 antibody results.

## 2023-08-30 ENCOUNTER — RESULT REVIEW (OUTPATIENT)
Age: 63
End: 2023-08-30

## 2023-08-30 ENCOUNTER — TRANSCRIPTION ENCOUNTER (OUTPATIENT)
Age: 63
End: 2023-08-30

## 2023-08-30 LAB
ALBUMIN SERPL ELPH-MCNC: 2.9 G/DL — LOW (ref 3.3–5)
ALBUMIN SERPL ELPH-MCNC: 3 G/DL — LOW (ref 3.3–5)
ALP SERPL-CCNC: 66 U/L — SIGNIFICANT CHANGE UP (ref 40–120)
ALP SERPL-CCNC: 74 U/L — SIGNIFICANT CHANGE UP (ref 40–120)
ALT FLD-CCNC: SIGNIFICANT CHANGE UP (ref 10–45)
ALT FLD-CCNC: SIGNIFICANT CHANGE UP U/L (ref 10–45)
ANION GAP SERPL CALC-SCNC: 5 MMOL/L — SIGNIFICANT CHANGE UP (ref 5–17)
ANION GAP SERPL CALC-SCNC: 7 MMOL/L — SIGNIFICANT CHANGE UP (ref 5–17)
ANISOCYTOSIS BLD QL: SLIGHT — SIGNIFICANT CHANGE UP
ANTI-RIBONUCLEAR PROTEIN: >8 AI — HIGH
APTT BLD: 32.7 SEC — SIGNIFICANT CHANGE UP (ref 24.5–35.6)
AST SERPL-CCNC: SIGNIFICANT CHANGE UP (ref 10–40)
AST SERPL-CCNC: SIGNIFICANT CHANGE UP U/L (ref 10–40)
AUTO DIFF PNL BLD: ABNORMAL
B PERT IGG+IGM PNL SER: SIGNIFICANT CHANGE UP
BASOPHILS # BLD AUTO: 0 K/UL — SIGNIFICANT CHANGE UP (ref 0–0.2)
BASOPHILS NFR BLD AUTO: 0 % — SIGNIFICANT CHANGE UP (ref 0–2)
BILIRUB SERPL-MCNC: 0.5 MG/DL — SIGNIFICANT CHANGE UP (ref 0.2–1.2)
BILIRUB SERPL-MCNC: 0.8 MG/DL — SIGNIFICANT CHANGE UP (ref 0.2–1.2)
BUN SERPL-MCNC: 11 MG/DL — SIGNIFICANT CHANGE UP (ref 7–23)
BUN SERPL-MCNC: 12 MG/DL — SIGNIFICANT CHANGE UP (ref 7–23)
C-ANCA SER-ACNC: NEGATIVE — SIGNIFICANT CHANGE UP
CALCIUM SERPL-MCNC: 8.8 MG/DL — SIGNIFICANT CHANGE UP (ref 8.4–10.5)
CALCIUM SERPL-MCNC: 8.9 MG/DL — SIGNIFICANT CHANGE UP (ref 8.4–10.5)
CHLORIDE SERPL-SCNC: 100 MMOL/L — SIGNIFICANT CHANGE UP (ref 96–108)
CHLORIDE SERPL-SCNC: 101 MMOL/L — SIGNIFICANT CHANGE UP (ref 96–108)
CK SERPL-CCNC: 120 U/L — SIGNIFICANT CHANGE UP (ref 25–170)
CO2 SERPL-SCNC: 21 MMOL/L — LOW (ref 22–31)
CO2 SERPL-SCNC: 23 MMOL/L — SIGNIFICANT CHANGE UP (ref 22–31)
COLOR FLD: SIGNIFICANT CHANGE UP
COMMENT - FLUIDS: SIGNIFICANT CHANGE UP
COVID-19 NUCLEOCAPSID GAM AB INTERP: NEGATIVE — SIGNIFICANT CHANGE UP
COVID-19 NUCLEOCAPSID TOTAL GAM ANTIBODY RESULT: 0.08 INDEX — SIGNIFICANT CHANGE UP
COVID-19 SPIKE DOMAIN AB INTERP: POSITIVE
COVID-19 SPIKE DOMAIN ANTIBODY RESULT: >250 U/ML — HIGH
CREAT SERPL-MCNC: 0.76 MG/DL — SIGNIFICANT CHANGE UP (ref 0.5–1.3)
CREAT SERPL-MCNC: 0.85 MG/DL — SIGNIFICANT CHANGE UP (ref 0.5–1.3)
CRP SERPL-MCNC: 18.1 MG/L — HIGH (ref 0–4)
DACRYOCYTES BLD QL SMEAR: SLIGHT — SIGNIFICANT CHANGE UP
DSDNA AB SER-ACNC: <12 IU/ML — SIGNIFICANT CHANGE UP
DSDNA AB SER-ACNC: <12 IU/ML — SIGNIFICANT CHANGE UP
EGFR: 76 ML/MIN/1.73M2 — SIGNIFICANT CHANGE UP
EGFR: 87 ML/MIN/1.73M2 — SIGNIFICANT CHANGE UP
ENA SM AB FLD QL: >8 AI — HIGH
EOSINOPHIL # BLD AUTO: 0 K/UL — SIGNIFICANT CHANGE UP (ref 0–0.5)
EOSINOPHIL NFR BLD AUTO: 0 % — SIGNIFICANT CHANGE UP (ref 0–6)
ERYTHROCYTE [SEDIMENTATION RATE] IN BLOOD: 26 MM/HR — HIGH
FLUID INTAKE SUBSTANCE CLASS: SIGNIFICANT CHANGE UP
GLUCOSE SERPL-MCNC: 103 MG/DL — HIGH (ref 70–99)
GLUCOSE SERPL-MCNC: 97 MG/DL — SIGNIFICANT CHANGE UP (ref 70–99)
GRAM STN FLD: SIGNIFICANT CHANGE UP
HCT VFR BLD CALC: 43.7 % — SIGNIFICANT CHANGE UP (ref 34.5–45)
HGB BLD-MCNC: 14.3 G/DL — SIGNIFICANT CHANGE UP (ref 11.5–15.5)
HYPOCHROMIA BLD QL: SLIGHT — SIGNIFICANT CHANGE UP
INR BLD: 1.05 — SIGNIFICANT CHANGE UP (ref 0.85–1.18)
LYMPHOCYTES # BLD AUTO: 0.18 K/UL — LOW (ref 1–3.3)
LYMPHOCYTES # BLD AUTO: 2.6 % — LOW (ref 13–44)
LYMPHOCYTES # FLD: 2 % — SIGNIFICANT CHANGE UP
MAGNESIUM SERPL-MCNC: 1.7 MG/DL — SIGNIFICANT CHANGE UP (ref 1.6–2.6)
MANUAL SMEAR VERIFICATION: SIGNIFICANT CHANGE UP
MCHC RBC-ENTMCNC: 31.3 PG — SIGNIFICANT CHANGE UP (ref 27–34)
MCHC RBC-ENTMCNC: 32.7 GM/DL — SIGNIFICANT CHANGE UP (ref 32–36)
MCV RBC AUTO: 95.6 FL — SIGNIFICANT CHANGE UP (ref 80–100)
MICROCYTES BLD QL: SLIGHT — SIGNIFICANT CHANGE UP
MONOCYTES # BLD AUTO: 0.13 K/UL — SIGNIFICANT CHANGE UP (ref 0–0.9)
MONOCYTES NFR BLD AUTO: 1.8 % — LOW (ref 2–14)
MONOS+MACROS # FLD: 4 % — SIGNIFICANT CHANGE UP
NEUTROPHILS # BLD AUTO: 6.72 K/UL — SIGNIFICANT CHANGE UP (ref 1.8–7.4)
NEUTROPHILS NFR BLD AUTO: 95.6 % — HIGH (ref 43–77)
NEUTROPHILS-BODY FLUID: 13 % — SIGNIFICANT CHANGE UP
OVALOCYTES BLD QL SMEAR: SLIGHT — SIGNIFICANT CHANGE UP
P-ANCA SER-ACNC: NEGATIVE — SIGNIFICANT CHANGE UP
PHOSPHATE SERPL-MCNC: 3.4 MG/DL — SIGNIFICANT CHANGE UP (ref 2.5–4.5)
PLAT MORPH BLD: NORMAL — SIGNIFICANT CHANGE UP
PLATELET # BLD AUTO: 154 K/UL — SIGNIFICANT CHANGE UP (ref 150–400)
POIKILOCYTOSIS BLD QL AUTO: SLIGHT — SIGNIFICANT CHANGE UP
POLYCHROMASIA BLD QL SMEAR: SLIGHT — SIGNIFICANT CHANGE UP
POTASSIUM SERPL-MCNC: SIGNIFICANT CHANGE UP (ref 3.5–5.3)
POTASSIUM SERPL-MCNC: SIGNIFICANT CHANGE UP MMOL/L (ref 3.5–5.3)
POTASSIUM SERPL-SCNC: SIGNIFICANT CHANGE UP (ref 3.5–5.3)
POTASSIUM SERPL-SCNC: SIGNIFICANT CHANGE UP MMOL/L (ref 3.5–5.3)
PROT SERPL-MCNC: 6.8 G/DL — SIGNIFICANT CHANGE UP (ref 6–8.3)
PROT SERPL-MCNC: 6.9 G/DL — SIGNIFICANT CHANGE UP (ref 6–8.3)
PROTHROM AB SERPL-ACNC: 11.9 SEC — SIGNIFICANT CHANGE UP (ref 9.5–13)
RBC # BLD: 4.57 M/UL — SIGNIFICANT CHANGE UP (ref 3.8–5.2)
RBC # FLD: 14.6 % — HIGH (ref 10.3–14.5)
RBC BLD AUTO: ABNORMAL
RCV VOL RI: 898 /UL — HIGH (ref 0–0)
SARS-COV-2 IGG+IGM SERPL QL IA: 0.08 INDEX — SIGNIFICANT CHANGE UP
SARS-COV-2 IGG+IGM SERPL QL IA: >250 U/ML — HIGH
SARS-COV-2 IGG+IGM SERPL QL IA: NEGATIVE — SIGNIFICANT CHANGE UP
SARS-COV-2 IGG+IGM SERPL QL IA: POSITIVE
SMUDGE CELLS # BLD: PRESENT — SIGNIFICANT CHANGE UP
SODIUM SERPL-SCNC: 127 MMOL/L — LOW (ref 135–145)
SODIUM SERPL-SCNC: 130 MMOL/L — LOW (ref 135–145)
SPECIMEN SOURCE: SIGNIFICANT CHANGE UP
TOTAL NUCLEATED CELL COUNT, BODY FLUID: 19 /UL — SIGNIFICANT CHANGE UP
TUBE TYPE: SIGNIFICANT CHANGE UP
WBC # BLD: 7.03 K/UL — SIGNIFICANT CHANGE UP (ref 3.8–10.5)
WBC # FLD AUTO: 7.03 K/UL — SIGNIFICANT CHANGE UP (ref 3.8–10.5)

## 2023-08-30 PROCEDURE — 99222 1ST HOSP IP/OBS MODERATE 55: CPT

## 2023-08-30 PROCEDURE — 71045 X-RAY EXAM CHEST 1 VIEW: CPT | Mod: 26

## 2023-08-30 PROCEDURE — 88305 TISSUE EXAM BY PATHOLOGIST: CPT | Mod: 26

## 2023-08-30 PROCEDURE — 31624 DX BRONCHOSCOPE/LAVAGE: CPT | Mod: GC

## 2023-08-30 PROCEDURE — 31628 BRONCHOSCOPY/LUNG BX EACH: CPT | Mod: GC

## 2023-08-30 PROCEDURE — 88112 CYTOPATH CELL ENHANCE TECH: CPT | Mod: 26

## 2023-08-30 RX ORDER — GABAPENTIN 400 MG/1
300 CAPSULE ORAL EVERY 8 HOURS
Refills: 0 | Status: DISCONTINUED | OUTPATIENT
Start: 2023-08-30 | End: 2023-09-13

## 2023-08-30 RX ORDER — MAGNESIUM SULFATE 500 MG/ML
2 VIAL (ML) INJECTION ONCE
Refills: 0 | Status: COMPLETED | OUTPATIENT
Start: 2023-08-30 | End: 2023-08-30

## 2023-08-30 RX ADMIN — Medication 1 TABLET(S): at 14:53

## 2023-08-30 RX ADMIN — PANTOPRAZOLE SODIUM 40 MILLIGRAM(S): 20 TABLET, DELAYED RELEASE ORAL at 07:29

## 2023-08-30 RX ADMIN — Medication 25 GRAM(S): at 16:55

## 2023-08-30 RX ADMIN — GABAPENTIN 300 MILLIGRAM(S): 400 CAPSULE ORAL at 21:06

## 2023-08-30 RX ADMIN — GABAPENTIN 100 MILLIGRAM(S): 400 CAPSULE ORAL at 14:53

## 2023-08-30 RX ADMIN — GABAPENTIN 100 MILLIGRAM(S): 400 CAPSULE ORAL at 05:18

## 2023-08-30 RX ADMIN — ENOXAPARIN SODIUM 40 MILLIGRAM(S): 100 INJECTION SUBCUTANEOUS at 16:55

## 2023-08-30 RX ADMIN — Medication 40 MILLIGRAM(S): at 16:55

## 2023-08-30 RX ADMIN — ENOXAPARIN SODIUM 40 MILLIGRAM(S): 100 INJECTION SUBCUTANEOUS at 05:18

## 2023-08-30 RX ADMIN — Medication 650 MILLIGRAM(S): at 23:54

## 2023-08-30 RX ADMIN — Medication 650 MILLIGRAM(S): at 22:54

## 2023-08-30 NOTE — PROGRESS NOTE ADULT - PROBLEM SELECTOR PLAN 7
Home meds prednisone 20mg BID, gabapentin 100g TID.    - c/w home meds #BL hand and arm numbness 2/2 to radiculopathy  Patient states that numbness in the BL UE has been increasing due to sleeping on hospital mattress.  Home meds prednisone 20mg BID, gabapentin 100g TID.    - prednisone 40 mg qD  - gabapentin increased 100 mg -> 300 mg TID  - PT consulted

## 2023-08-30 NOTE — PROGRESS NOTE ADULT - PROBLEM SELECTOR PLAN 1
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 RVP, cov negative. Hb and Hct elevated in setting of  chronic sob  CXR: pulmonary infiltrates  CTA: Bilateral interstitial lung disease. Differential diagnosis includes HP, NSIP, atypical pneumonia.    Possibly idiopathic pulmonary fibrosis with chronic duration given CTA findings and CXR. 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 Symptoms have not improved with prednisone 20mg BID, may be less likely ILD secondary to rheumatological causes. Less likely interstitial pneumonia given asymptomatic and wbc wnl. Physical exam not notable for connective tissue diseases.   Procal 0.09, legionella (-), strep pneumo (-)  8/28: patient O2 requirement 3L NC -> 4L NC  High resolution CT findings in problem 4 ground glass below    - f/u pulm consult  - restarting prednisone 40mg qD  - incentive spirometer  - wean o2 as appropriate

## 2023-08-30 NOTE — PROGRESS NOTE ADULT - PROBLEM SELECTOR PLAN 4
CXR revealed ground glass opacity. CTA revealed interstitial lung disease.  Procal 0.09  Anti- SSA 0.4, SSB <0.2, rheumatoid factor quant <10, CCP (-), centromere ab <0.2, anti-ribonuclear >8.0  High resolution CT:  1. Since August 26, 2023, again interstitial lung disease non-UIP pattern. Although no subpleural sparing, possibly interstitial pneumonia, differential includes NSIP associated with connective tissue disorders, chronic HP or drug toxicity.    - f/u rheumatologic workup: ANCA, ARIANA, anti-dsDNA, complement levels, myomarker panel, and systemic sclerosis panel  - f/u hypersensitivity pneumonitis panel  - continue to monitor sxs

## 2023-08-30 NOTE — DIETITIAN INITIAL EVALUATION ADULT - PERTINENT MEDS FT
MEDICATIONS  (STANDING):  dextrose 5%. 1000 milliLiter(s) (50 mL/Hr) IV Continuous <Continuous>  dextrose 5%. 1000 milliLiter(s) (100 mL/Hr) IV Continuous <Continuous>  dextrose 50% Injectable 25 Gram(s) IV Push once  dextrose 50% Injectable 12.5 Gram(s) IV Push once  dextrose 50% Injectable 25 Gram(s) IV Push once  enoxaparin Injectable 40 milliGRAM(s) SubCutaneous every 12 hours  gabapentin 100 milliGRAM(s) Oral three times a day  glucagon  Injectable 1 milliGRAM(s) IntraMuscular once  insulin lispro (ADMELOG) corrective regimen sliding scale   SubCutaneous Before meals and at bedtime  pantoprazole    Tablet 40 milliGRAM(s) Oral before breakfast  trimethoprim  160 mG/sulfamethoxazole 800 mG 1 Tablet(s) Oral daily    MEDICATIONS  (PRN):  acetaminophen     Tablet .. 650 milliGRAM(s) Oral every 6 hours PRN Temp greater or equal to 38C (100.4F), Mild Pain (1 - 3)  dextrose Oral Gel 15 Gram(s) Oral once PRN Blood Glucose LESS THAN 70 milliGRAM(s)/deciliter

## 2023-08-30 NOTE — PROGRESS NOTE ADULT - PROBLEM SELECTOR PLAN 1
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 RVP, cov negative. Hb and Hct elevated in setting of  chronic sob  CXR: pulmonary infiltrates  CTA: Bilateral interstitial lung disease. Differential diagnosis includes HP, NSIP, atypical pneumonia.    Possibly idiopathic pulmonary fibrosis with chronic duration given CTA findings and CXR. 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 Symptoms have not improved with prednisone 20mg BID, may be less likely ILD secondary to rheumatological causes. Less likely interstitial pneumonia given asymptomatic and wbc wnl. Physical exam not notable for connective tissue diseases.   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 RVP, cov negative. Hb and Hct elevated in setting of  chronic sob  CXR: pulmonary infiltrates  CTA: Bilateral interstitial lung disease. Differential diagnosis includes HP, NSIP, atypical pneumonia.    Possibly idiopathic pulmonary fibrosis with chronic duration given CTA findings and CXR. 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 Symptoms have not improved with prednisone 20mg BID, may be less likely ILD secondary to rheumatological causes. Less likely interstitial pneumonia given asymptomatic and wbc wnl. Physical exam not notable for connective tissue diseases.   Procal 0.09, legionella (-), strep pneumo (-)  8/28: patient O2 requirement 3L NC -> 4L NC  High resolution CT findings in problem 4 ground glass below    - f/u pulm consult  - restarting prednisone 40mg qD  - incentive spirometer  - wean o2 as appropriate

## 2023-08-30 NOTE — DIETITIAN INITIAL EVALUATION ADULT - PROBLEM SELECTOR PLAN 2
No acs sxs. Elevated troponin, downtrending. In the setting of chronic sob and dyspnea on exertion. BNP wnl    - f/u cardiology consult  - f/u a1c, lipid profile,  - f/u tte

## 2023-08-30 NOTE — PROGRESS NOTE ADULT - PROBLEM SELECTOR PLAN 5
AST 58, ALT 71. May be elevated in the setting of decreased perfusion. Cr wnl  Hep panel non-reactive     - trend cmp daily Statement Selected

## 2023-08-30 NOTE — PROGRESS NOTE ADULT - ASSESSMENT
62 year old female with a past medical history of afib, and sciatica, who presented to [facility] for shortness of breath found to have tropinemia and interstitial lung disease admitted for hypoxia.

## 2023-08-30 NOTE — DIETITIAN INITIAL EVALUATION ADULT - PROBLEM SELECTOR PLAN 5
AST 58, ALT 71. May be elevated in the setting of decreased perfusion. Cr wnl    - trend cmp daily  - hep panel

## 2023-08-30 NOTE — DIETITIAN INITIAL EVALUATION ADULT - PROBLEM SELECTOR PLAN 1
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 RVP, cov negative. Hb and Hct elevated in setting of  chronic sob  CXR: pulmonary infiltrates  CTA: Bilateral interstitial lung disease. Differential diagnosis includes HP,NSIP, atypical pneumonia.    Possibly idiopathic pulmonary fibrosis with chronic duration given CTA findings and CXR. 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 Symptoms have not improved with prednisone 20mg BID, may be less likely ILD secondary to rheumatological causes. Less likely interstitial pneumonia given asymptomatic and wbc wnl. Physical exam not notable for connective tissue diseases.     - f/u pulm consult  - prednisone 40mg qD  - consider ct chest  - incentive spirometer  - f/u legionella and strep  - f/u procal  - wean o2

## 2023-08-30 NOTE — PROGRESS NOTE ADULT - SUBJECTIVE AND OBJECTIVE BOX
O/N Events: patient stated that she had a increase in the BL hand numbness 2/2 to pinched nerve in her neck.    Subjective/ROS: Patient seen and examined at bedside. Patient's partner was by bed side. Patient stated that her increase in BL hand numbness is likely due to the hospital mattress. Patient states that she feels shes oxygenating well on 4L NC, no increased work of breathing since yesterday.     Denies Fever/Chills, HA, CP, n/v, changes in bowel/urinary habits.  12pt ROS otherwise negative.    VITALS  Vital Signs Last 24 Hrs  T(C): 37.7 (30 Aug 2023 05:23), Max: 37.7 (30 Aug 2023 05:23)  T(F): 99.8 (30 Aug 2023 05:23), Max: 99.8 (30 Aug 2023 05:23)  HR: 99 (30 Aug 2023 06:51) (88 - 103)  BP: 105/64 (30 Aug 2023 05:23) (105/64 - 144/84)  BP(mean): --  RR: 18 (30 Aug 2023 05:23) (17 - 18)  SpO2: 98% (30 Aug 2023 06:51) (93% - 98%)    Parameters below as of 30 Aug 2023 06:51  Patient On (Oxygen Delivery Method): nasal cannula  O2 Flow (L/min): 4      CAPILLARY BLOOD GLUCOSE      POCT Blood Glucose.: 112 mg/dL (29 Aug 2023 17:50)  POCT Blood Glucose.: 122 mg/dL (29 Aug 2023 12:10)      PHYSICAL EXAM  General: NAD  Head: NC/AT;   Neck: Supple; no JVD  Respiratory: BL crackles in lung bases   Cardiovascular: Regular rhythm/rate; S1/S2+, no murmurs  Gastrointestinal: Non-distended  Extremities: WWP; BL lymphedema   Neurological: A&Ox3, no obvious focal deficits  patient was wearing eye mask during interview    MEDICATIONS  (STANDING):  dextrose 5%. 1000 milliLiter(s) (50 mL/Hr) IV Continuous <Continuous>  dextrose 5%. 1000 milliLiter(s) (100 mL/Hr) IV Continuous <Continuous>  dextrose 50% Injectable 25 Gram(s) IV Push once  dextrose 50% Injectable 12.5 Gram(s) IV Push once  dextrose 50% Injectable 25 Gram(s) IV Push once  enoxaparin Injectable 40 milliGRAM(s) SubCutaneous every 12 hours  gabapentin 100 milliGRAM(s) Oral three times a day  glucagon  Injectable 1 milliGRAM(s) IntraMuscular once  insulin lispro (ADMELOG) corrective regimen sliding scale   SubCutaneous Before meals and at bedtime  pantoprazole    Tablet 40 milliGRAM(s) Oral before breakfast  trimethoprim  160 mG/sulfamethoxazole 800 mG 1 Tablet(s) Oral daily    MEDICATIONS  (PRN):  acetaminophen     Tablet .. 650 milliGRAM(s) Oral every 6 hours PRN Temp greater or equal to 38C (100.4F), Mild Pain (1 - 3)  dextrose Oral Gel 15 Gram(s) Oral once PRN Blood Glucose LESS THAN 70 milliGRAM(s)/deciliter      No Known Allergies      LABS                        14.3   7.03  )-----------( 154      ( 30 Aug 2023 06:08 )             43.7     08-30    130<L>  |  100  |  12  ----------------------------<  97  See Note   |  23  |  0.85    Ca    8.9      30 Aug 2023 06:08  Phos  3.4     08-30  Mg     1.7     08-30    TPro  6.8  /  Alb  3.0<L>  /  TBili  0.5  /  DBili  x   /  AST  See Note  /  ALT  See Note  /  AlkPhos  74  08-30    PT/INR - ( 30 Aug 2023 06:08 )   PT: 11.9 sec;   INR: 1.05          PTT - ( 30 Aug 2023 06:08 )  PTT:32.7 sec  Urinalysis Basic - ( 30 Aug 2023 06:08 )    Color: x / Appearance: x / SG: x / pH: x  Gluc: 97 mg/dL / Ketone: x  / Bili: x / Urobili: x   Blood: x / Protein: x / Nitrite: x   Leuk Esterase: x / RBC: x / WBC x   Sq Epi: x / Non Sq Epi: x / Bacteria: x      CARDIAC MARKERS ( 30 Aug 2023 06:08 )  x     / x     / 120 U/L / x     / x              IMAGING/EKG/ETC

## 2023-08-30 NOTE — CONSULT NOTE ADULT - ASSESSMENT
62 year old female with PMH of ILD, Afib admitted for respiratory failure, is being evaluated for worsening pain and numbness of upper extremities. Her physical exam showing intact strength and stable gait. HEr presentation is pretty consistent with radiculopathy.    Recommendations:  - Recommend increasing Gabapentin 300 mg TID  - If cleared by Pulmonology, recommend restarting Prednisone , agrees with higher dosing than home dose.  - Patient has the CD of MRI C spine performed on 08/15: got it uploaded in EMR , no cord compression , will review with attending.  - Physical therapy.  - Neurology will follow.  62 year old female with PMH of ILD, Afib admitted for respiratory failure, is being evaluated for worsening pain and numbness of upper extremities. Her physical exam showing intact strength and stable gait.   Reviewed her MRI C-spine: moderate to severe spinal stenosis at C3-4, C4-5 level , but no cord signal. Neural foraminal stenosis left at C3-4 level, and bilateral at C4-5 level.  Her sensory distribution and presence of allodynia seems to be more likely due to non-length dependent small fiber neuropathy, which is very much possible with her concomitant autoimmune disease. But will also rule out Carpal tunnel syndrome ( unlikely as Tinel And Phalen sign: negative)    Recommendations:  - Obtain Vit-B12, SPEP, UPEP, immunofixation  - Patient will need EMG-NCS as outpatient  - Recommend increasing Gabapentin 300 mg TID: can uptitrate slowly if patient can't tolerate.   - Patient has the CD of MRI C spine performed on 08/15: got it uploaded in EMR : reviewed: see above  - Physical therapy.  - Can follow up with Dr. Dipak Carroll upon discharge . Contact: [phone]  - Neurology will sign off, please call the service if you have any question or concern.

## 2023-08-30 NOTE — PROGRESS NOTE ADULT - ASSESSMENT
62 year old female with a past medical history of afib, and sciatica, who presented to [facility] for shortness of breath found to have tropinemia and interstitial lung disease admitted for hypoxia.

## 2023-08-30 NOTE — CONSULT NOTE ADULT - SUBJECTIVE AND OBJECTIVE BOX
HPI:  This is a 62 year old female with a past medical history of afib, and sciatica, who presented to [facility] for shortness of breath, currently being treated for respiratory failure. Neurology is being consulted for worsening upper extremity numbness and pain. Antonio states that she was admitted in a hospital in [place] for Pneumonia and developed numbness and pain of both hands. She had MRI done and was told she has pinched nerve in her neck and prescribed oral steroid and gabapentin. She has been receiving acupuncture chiropractor and her symptoms were well controlled. She was planning to go for PT , and hasn't seen any neurologist yet. Since this morning she noticed worsening of her pain and numbness of both hands and numbness of her toes. She attributes it to the uncomfortable mattress. She is having trouble using her fingers due to pain,  She denies any weakness of her hands, denies any difficulty walking. No bladder bowel symptoms.  No blurred vision, slurred speech, headache, neck pain.             PAST MEDICAL & SURGICAL HISTORY:  Afib  ILD        Medications:  acetaminophen     Tablet .. 650 milliGRAM(s) Oral every 6 hours PRN  dextrose 5%. 1000 milliLiter(s) IV Continuous <Continuous>  dextrose 5%. 1000 milliLiter(s) IV Continuous <Continuous>  dextrose 50% Injectable 25 Gram(s) IV Push once  dextrose 50% Injectable 12.5 Gram(s) IV Push once  dextrose 50% Injectable 25 Gram(s) IV Push once  dextrose Oral Gel 15 Gram(s) Oral once PRN  enoxaparin Injectable 40 milliGRAM(s) SubCutaneous every 12 hours  gabapentin 100 milliGRAM(s) Oral three times a day  glucagon  Injectable 1 milliGRAM(s) IntraMuscular once  insulin lispro (ADMELOG) corrective regimen sliding scale   SubCutaneous Before meals and at bedtime  pantoprazole    Tablet 40 milliGRAM(s) Oral before breakfast  trimethoprim  160 mG/sulfamethoxazole 800 mG 1 Tablet(s) Oral daily      Vital Signs Last 24 Hrs  T(C): 37.7 (30 Aug 2023 11:42), Max: 37.7 (30 Aug 2023 05:23)  T(F): 99.8 (30 Aug 2023 05:23), Max: 99.8 (30 Aug 2023 05:23)  HR: 99 (30 Aug 2023 11:42) (88 - 103)  BP: 105/64 (30 Aug 2023 11:42) (105/64 - 144/84)  BP(mean): 85 (30 Aug 2023 11:42) (85 - 85)  RR: 18 (30 Aug 2023 11:42) (17 - 18)  SpO2: 98% (30 Aug 2023 11:42) (93% - 98%)    Parameters below as of 30 Aug 2023 11:42    O2 Flow (L/min): 4      Neurological Exam:   Mental status: Awake, alert and oriented x3.    Naming, repetition and comprehension intact.  Attention/concentration intact.  No dysarthria, no aphasia.    Cranial nerves: Pupils equally round and reactive to light, visual fields full, no nystagmus, extraocular muscles intact, V1 through V3 intact bilaterally and symmetric, face symmetric, hearing intact to finger rub, palate elevation symmetric, tongue was midline.  Motor:  MRC grading 5/5 b/l UE/LE.   strength 5/5.  Normal tone and bulk.  No abnormal movements.    Sensation: Decreased temperature sensation over all 4 extremities intact pinprick and light touch B/L.   Coordination: No dysmetria on finger-to-nose   Reflexes: 2+ in bilateral UE/LE, downgoing toes bilaterally. (-) Bolaños.  Gait: Narrow and steady. No ataxia.     Labs:  Fingerstick Blood Glucose: CAPILLARY BLOOD GLUCOSE      POCT Blood Glucose.: 112 mg/dL (29 Aug 2023 17:50)    LABS:                        14.3   7.03  )-----------( 154      ( 30 Aug 2023 06:08 )             43.7     08-30    127<L>  |  101  |  11  ----------------------------<  103<H>  See note   |  21<L>  |  0.76    Ca    8.8      30 Aug 2023 10:12  Phos  3.4     08-30  Mg     1.7     08-30    TPro  6.9  /  Alb  2.9<L>  /  TBili  0.8  /  DBili  x   /  AST  See note  /  ALT  See note  /  AlkPhos  66  08-30    PT/INR - ( 30 Aug 2023 06:08 )   PT: 11.9 sec;   INR: 1.05          PTT - ( 30 Aug 2023 06:08 )  PTT:32.7 sec  CARDIAC MARKERS ( 30 Aug 2023 06:08 )  x     / x     / 120 U/L / x     / x          Urinalysis Basic - ( 30 Aug 2023 10:12 )    Color: x / Appearance: x / SG: x / pH: x  Gluc: 103 mg/dL / Ketone: x  / Bili: x / Urobili: x   Blood: x / Protein: x / Nitrite: x   Leuk Esterase: x / RBC: x / WBC x   Sq Epi: x / Non Sq Epi: x / Bacteria: x         HPI:  This is a 62 year old female with a past medical history of afib, and sciatica, who presented to [facility] for shortness of breath, currently being treated for respiratory failure. Neurology is being consulted for worsening upper extremity numbness and pain. Patient states that she was admitted in a hospital in [place] for Pneumonia and developed numbness and pain of both hands. She had MRI done and was told she has pinched nerve in her neck and prescribed oral steroid and gabapentin. She has been receiving acupuncture chiropractor and her symptoms were well controlled. She was planning to go for PT , and hasn't seen any neurologist yet. Since this morning she noticed worsening of her pain and numbness of both hands and numbness of her toes. She attributes it to the uncomfortable mattress. She is having trouble using her fingers due to pain,  She denies any weakness of her hands, denies any difficulty walking. No bladder bowel symptoms.  No blurred vision, slurred speech, headache, neck pain.             PAST MEDICAL & SURGICAL HISTORY:  Afib  ILD        Medications:  acetaminophen     Tablet .. 650 milliGRAM(s) Oral every 6 hours PRN  dextrose 5%. 1000 milliLiter(s) IV Continuous <Continuous>  dextrose 5%. 1000 milliLiter(s) IV Continuous <Continuous>  dextrose 50% Injectable 25 Gram(s) IV Push once  dextrose 50% Injectable 12.5 Gram(s) IV Push once  dextrose 50% Injectable 25 Gram(s) IV Push once  dextrose Oral Gel 15 Gram(s) Oral once PRN  enoxaparin Injectable 40 milliGRAM(s) SubCutaneous every 12 hours  gabapentin 100 milliGRAM(s) Oral three times a day  glucagon  Injectable 1 milliGRAM(s) IntraMuscular once  insulin lispro (ADMELOG) corrective regimen sliding scale   SubCutaneous Before meals and at bedtime  pantoprazole    Tablet 40 milliGRAM(s) Oral before breakfast  trimethoprim  160 mG/sulfamethoxazole 800 mG 1 Tablet(s) Oral daily      Vital Signs Last 24 Hrs  T(C): 37.7 (30 Aug 2023 11:42), Max: 37.7 (30 Aug 2023 05:23)  T(F): 99.8 (30 Aug 2023 05:23), Max: 99.8 (30 Aug 2023 05:23)  HR: 99 (30 Aug 2023 11:42) (88 - 103)  BP: 105/64 (30 Aug 2023 11:42) (105/64 - 144/84)  BP(mean): 85 (30 Aug 2023 11:42) (85 - 85)  RR: 18 (30 Aug 2023 11:42) (17 - 18)  SpO2: 98% (30 Aug 2023 11:42) (93% - 98%)    Parameters below as of 30 Aug 2023 11:42    O2 Flow (L/min): 4      Neurological Exam:   Mental status: Awake, alert and oriented x3.    Naming, repetition and comprehension intact.  Attention/concentration intact.  No dysarthria, no aphasia.    Cranial nerves: Pupils equally round and reactive to light, visual fields full, no nystagmus, extraocular muscles intact, V1 through V3 intact bilaterally and symmetric, face symmetric, hearing intact to finger rub, palate elevation symmetric, tongue was midline.  Motor:  MRC grading 5/5 b/l UE/LE.   strength 5/5.  Normal tone and bulk.  No abnormal movements.    Sensation: Decreased temperature sensation over all 4 extremities intact pinprick and light touch B/L. Hypersensitivity to pinprick over both hands upto forearm.  Coordination: No dysmetria on finger-to-nose   Reflexes: 2+ in bilateral UE/LE, downgoing toes bilaterally. (-) Bolaños.  Gait: Narrow and steady. No ataxia.     Labs:  Fingerstick Blood Glucose: CAPILLARY BLOOD GLUCOSE      POCT Blood Glucose.: 112 mg/dL (29 Aug 2023 17:50)    LABS:                        14.3   7.03  )-----------( 154      ( 30 Aug 2023 06:08 )             43.7     08-30    127<L>  |  101  |  11  ----------------------------<  103<H>  See note   |  21<L>  |  0.76    Ca    8.8      30 Aug 2023 10:12  Phos  3.4     08-30  Mg     1.7     08-30    TPro  6.9  /  Alb  2.9<L>  /  TBili  0.8  /  DBili  x   /  AST  See note  /  ALT  See note  /  AlkPhos  66  08-30    PT/INR - ( 30 Aug 2023 06:08 )   PT: 11.9 sec;   INR: 1.05          PTT - ( 30 Aug 2023 06:08 )  PTT:32.7 sec  CARDIAC MARKERS ( 30 Aug 2023 06:08 )  x     / x     / 120 U/L / x     / x          Urinalysis Basic - ( 30 Aug 2023 10:12 )    Color: x / Appearance: x / SG: x / pH: x  Gluc: 103 mg/dL / Ketone: x  / Bili: x / Urobili: x   Blood: x / Protein: x / Nitrite: x   Leuk Esterase: x / RBC: x / WBC x   Sq Epi: x / Non Sq Epi: x / Bacteria: x

## 2023-08-30 NOTE — DIETITIAN INITIAL EVALUATION ADULT - PERTINENT LABORATORY DATA
08-30    127<L>  |  101  |  11  ----------------------------<  103<H>  See note   |  21<L>  |  0.76    Ca    8.8      30 Aug 2023 10:12  Phos  3.4     08-30  Mg     1.7     08-30    TPro  6.9  /  Alb  2.9<L>  /  TBili  0.8  /  DBili  x   /  AST  See note  /  ALT  See note  /  AlkPhos  66  08-30  POCT Blood Glucose.: 112 mg/dL (08-29-23 @ 17:50)  A1C with Estimated Average Glucose Result: 6.1 % (08-27-23 @ 05:30)

## 2023-08-30 NOTE — PROGRESS NOTE ADULT - PROBLEM SELECTOR PLAN 6
Pt states she had afib 1 month ago. Has not been taking medications.    - not currently in afib  -f/u with cardiology

## 2023-08-30 NOTE — DIETITIAN INITIAL EVALUATION ADULT - PERSON TAUGHT/METHOD
Reviewed general healthful diet, outpatient nutrition program/verbal instruction/patient instructed/significant other instructed

## 2023-08-30 NOTE — DIETITIAN NUTRITION RISK NOTIFICATION - ADDITIONAL COMMENTS/DIETITIAN RECOMMENDATIONS
1. Continue with diet order   2. Encourage pt to meet nutritional needs as able   3. Monitor labs: electrolytes, cmp  4. Monitor weights   5. Pain and bowel regimen per team   6. Will continue to assess/honor food preferences as able  7. Monitor adherence to diet education  patient instructed; significant other instructed; verbal instruction; Reviewed general healthful diet, Provided information on [facility]. Outpatient contact provided for potential enrollment

## 2023-08-30 NOTE — DIETITIAN INITIAL EVALUATION ADULT - OTHER INFO
62 year old female with a past medical history of afib, and sciatica, who presented to [facility] for shortness of breath. Pt states that she went to the urgent care today where she had an XR done concerning for b/l lower infiltrates, and was driven here to the ED by car from the ambulance. She states that she can only walk 12-15 feet and feels short of breath. She states that she has a pulse ox at home and had readings of 82. She states that she had chronic SOB for several months and got worked up in [place] where she had a CT scan that was negative for PE. She also states that she was seen by a pulmonologist and rheumatology, where they ruled out lupus and other immunological disorders. Pt states that she has a cough with no sputum. Pt denies any uri sxs, chest pain, pain with breathing, abdominal pain, nvd, headaches,     Patient and partner seen at bedside for length of stay initial assessment. Current diet order: DASH/TLC. NKFA. No difficulty chewing/swallowing reported. Reports blank appetite. Patient did not consume breakfast this AM due to NPO for procedure, however endorses good appetite and PO intake since admit and PTA. Food preferences reviewed and documented in CBORD: lactaid milk. Patient limits sodium intake at home, otherwise does not follow any specific diet. Reviewed general healthful diet for gradual weight loss, patient and partner expressed understanding. Provided information on [place] [address] [facility] Weight [facility] Program. Outpatient contact provided for potential enrollment: [email or web address], [phone]. Dosing weight: 288.4#, BMI 44, reports UBW of 287# and denies recent weight loss. No pressure injuries or edema documented. Denies n/v/d/c/abd pain, last BM 8/30 per EMR. Labs: Na low (127), glucose trending  x 24 hours, HgbA1C 6.1%. Meds: abx, insulin, protonix. Observed with no overt signs and symptoms of muscle or fat wasting. Based on ASPEN guidelines, pt does not meet criteria for malnutrition. See nutrition recommendations below.  62 year old female with a past medical history of afib, and sciatica, who presented to [facility] for shortness of breath. Pt states that she went to the urgent care today where she had an XR done concerning for b/l lower infiltrates, and was driven here to the ED by car from the ambulance. She states that she can only walk 12-15 feet and feels short of breath. She states that she has a pulse ox at home and had readings of 82. She states that she had chronic SOB for several months and got worked up in [place] where she had a CT scan that was negative for PE. She also states that she was seen by a pulmonologist and rheumatology, where they ruled out lupus and other immunological disorders. Pt states that she has a cough with no sputum. Pt denies any uri sxs, chest pain, pain with breathing, abdominal pain, nvd, headaches,     Patient and partner seen at bedside for length of stay initial assessment. Current diet order: DASH/TLC. NKFA. No difficulty chewing/swallowing reported. Patient did not consume breakfast this AM due to NPO for procedure, however endorses good appetite and PO intake since admit and PTA. Food preferences reviewed and documented in CBORD: lactaid milk. Patient limits sodium intake at home, otherwise does not follow any specific diet. Reviewed general healthful diet for gradual weight loss, patient and partner expressed understanding. Provided information on [place] [address] Outpatient Weight Loss Program. Outpatient contact provided for potential enrollment: [email or web address], [phone]. Dosing weight: 288.4#, BMI 44, reports UBW of 287# and denies recent weight loss. No pressure injuries or edema documented. Denies n/v/d/c/abd pain, last BM 8/30 per EMR. Labs: Na low (127), glucose trending  x 24 hours, HgbA1C 6.1%. Meds: abx, insulin, protonix. Observed with no overt signs and symptoms of muscle or fat wasting. Based on ASPEN guidelines, pt does not meet criteria for malnutrition. See nutrition recommendations below.

## 2023-08-30 NOTE — PROGRESS NOTE ADULT - SUBJECTIVE AND OBJECTIVE BOX
PULMONARY CONSULT SERVICE FOLLOW-UP NOTE    INTERVAL HPI:  Reviewed chart and overnight events; patient seen and examined at bedside.    MEDICATIONS:  Pulmonary:    Antimicrobials:  trimethoprim  160 mG/sulfamethoxazole 800 mG 1 Tablet(s) Oral daily    Anticoagulants:  enoxaparin Injectable 40 milliGRAM(s) SubCutaneous every 12 hours    Cardiac:      Allergies    No Known Allergies    Intolerances        Vital Signs Last 24 Hrs  T(C): 37.7 (30 Aug 2023 05:23), Max: 37.7 (30 Aug 2023 05:23)  T(F): 99.8 (30 Aug 2023 05:23), Max: 99.8 (30 Aug 2023 05:23)  HR: 99 (30 Aug 2023 06:51) (88 - 103)  BP: 105/64 (30 Aug 2023 05:23) (105/64 - 144/84)  BP(mean): --  RR: 18 (30 Aug 2023 05:23) (17 - 18)  SpO2: 98% (30 Aug 2023 06:51) (93% - 98%)    Parameters below as of 30 Aug 2023 06:51  Patient On (Oxygen Delivery Method): nasal cannula  O2 Flow (L/min): 4          PHYSICAL EXAM:  GENERAL: Pleasant, alert and oriented, obese, middle aged female not in any acute distress, appears comfortable.  HEENT: Nonicteric sclerae, PERRLA, EOMI. Oropharynx clear. Moist mucous membranes. No cervical, supraclavicular, or axillary LAD.  CHEST: Chest wall is nontender.  HEART: Regular rate and rhythm without any appreciable m/r/g.  LUNGS: Breathing comfortably. Difficult to appreciate breath sounds due to body habitus, but appears to have lung fields clear to auscultation bilaterally. No r/r/w.  ABDOMEN: Soft, normoactive bowel sounds, nontender in all quadrants.  : No suprapubic tenderness to palpation.  SKIN: No rash, no excessive bruising, petechiae, or purpura. No dorsal hand thickening,  hands, or digital calcification or ulceration.  NEUROLOGIC: Moves all extremities spontaneously.  EXTREMITIES: 2+ bilateral radial pulses; WWP; No BLE pitting edema, clubbing or cyanosis; BLE varicose veins    LABS:  ABG - ( 29 Aug 2023 16:25 )  pH, Arterial: 7.41  pH, Blood: x     /  pCO2: 38    /  pO2: 81    / HCO3: 24    / Base Excess: -0.3  /  SaO2: 97.7                CBC Full  -  ( 30 Aug 2023 06:08 )  WBC Count : 7.03 K/uL  RBC Count : 4.57 M/uL  Hemoglobin : 14.3 g/dL  Hematocrit : 43.7 %  Platelet Count - Automated : 154 K/uL  Mean Cell Volume : 95.6 fl  Mean Cell Hemoglobin : 31.3 pg  Mean Cell Hemoglobin Concentration : 32.7 gm/dL  Auto Neutrophil # : 6.72 K/uL  Auto Lymphocyte # : 0.18 K/uL  Auto Monocyte # : 0.13 K/uL  Auto Eosinophil # : 0.00 K/uL  Auto Basophil # : 0.00 K/uL  Auto Neutrophil % : 95.6 %  Auto Lymphocyte % : 2.6 %  Auto Monocyte % : 1.8 %  Auto Eosinophil % : 0.0 %  Auto Basophil % : 0.0 %    08-30    130<L>  |  100  |  12  ----------------------------<  97  See Note   |  23  |  0.85    Ca    8.9      30 Aug 2023 06:08  Phos  3.4     08-30  Mg     1.7     08-30    TPro  6.8  /  Alb  3.0<L>  /  TBili  0.5  /  DBili  x   /  AST  See Note  /  ALT  See Note  /  AlkPhos  74  08-30    PT/INR - ( 30 Aug 2023 06:08 )   PT: 11.9 sec;   INR: 1.05          PTT - ( 30 Aug 2023 06:08 )  PTT:32.7 sec      Urinalysis Basic - ( 30 Aug 2023 06:08 )    Color: x / Appearance: x / SG: x / pH: x  Gluc: 97 mg/dL / Ketone: x  / Bili: x / Urobili: x   Blood: x / Protein: x / Nitrite: x   Leuk Esterase: x / RBC: x / WBC x   Sq Epi: x / Non Sq Epi: x / Bacteria: x                RADIOLOGY & ADDITIONAL STUDIES:

## 2023-08-30 NOTE — PROGRESS NOTE ADULT - PROBLEM SELECTOR PLAN 7
#BL hand and arm numbness 2/2 to radiculopathy  Patient states that numbness in the BL UE has been increasing due to sleeping on hospital mattress.  Home meds prednisone 20mg BID, gabapentin 100g TID.    - prednisone 40 mg qD  - gabapentin increased 100 mg -> 300 mg TID  - PT consulted

## 2023-08-30 NOTE — PROGRESS NOTE ADULT - PROBLEM SELECTOR PLAN 5
How Many Skin Cancers Have You Had?: one What Is The Reason For Today's Visit?: History of Non-Melanoma Skin Cancer When Was Your Last Cancer Diagnosed?: 2014 AST 58, ALT 71. May be elevated in the setting of decreased perfusion. Cr wnl  Hep panel non-reactive     - trend cmp daily

## 2023-08-30 NOTE — DIETITIAN INITIAL EVALUATION ADULT - PROBLEM SELECTOR PLAN 3
Trops downtrending. Troponin may be elevated in the setting of demand ischemia. Pt has history of chronic SOB. BNP wnl    - f/u cardiology rec  - continue to monitor symptoms  - f/u tte Principal Discharge DX:	Miscarriage   1

## 2023-08-30 NOTE — PROGRESS NOTE ADULT - SUBJECTIVE AND OBJECTIVE BOX
PULMONARY INTERSTITIAL LUNG DISEASE (ILD) SERVICE INITIAL CONSULT NOTE    HPI:  62 year old female with PMH of afib, and sciatica, who presented to [facility] ED for shortness of breath. Pt states that she went to the urgent care ([facility]) where she had a CXR performed concerning for b/l lower infiltrates, and was driven here to the ED by car from the ambulance. She states that she can only walk 12-15 feet and felt short of breath. She states that she has a pulse ox at home and had readings of in the low SpO2 around 88. She states that she had chronic SOB for several months and got worked up in [place] where she had a CT scan in June that was negative for PE. She describes GERD-like symptoms and PND. Mentions a "bronchial infection" in [place] 3-4 months ago and her symptoms have persisted since. She also states that she was seen by a pulmonologist and rheumatology, where they ruled out lupus and other immunological disorders. Pt states that she has a cough with no sputum. 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 No pets or specific allergies she is aware of. Denies any other environmental or occupational exposures. Denies any family history of lung disease, lung cancer, or autoimmune/rheumatological diseases. No other personal history of pulmonary disease or rheumatologic phenomena although does have digit paresthesias secondary to cervical spine issues for which she was given a short course of steroids. Never smoker. No correlation between symptoms and geographical location. Denies any fever, chills, night sweats, weight loss, chest pain, rash, arthralgias or myalgias.  labs with positive RNP - other rheum serologies are still pending.   Now on 3 L ox oxygen with normal O2 sat at rest  Pulmonology has started steroid at 60 mg daily  s/p bronchoscopy today    REVIEW OF SYSTEMS:  A 12 point ROS was negative other than noted in HPI above.    PAST MEDICAL & SURGICAL HISTORY:      FAMILY HISTORY:      SOCIAL HISTORY:  Smoking Status: [ ] Current, [ ] Former, [x] Never  Pack Years: 0    MEDICATIONS:  Pulmonary:    Antimicrobials:    Anticoagulants:  enoxaparin Injectable 40 milliGRAM(s) SubCutaneous every 12 hours    Onc:    GI/:  pantoprazole    Tablet 40 milliGRAM(s) Oral before breakfast    Endocrine:  dextrose 50% Injectable 25 Gram(s) IV Push once  dextrose 50% Injectable 12.5 Gram(s) IV Push once  dextrose 50% Injectable 25 Gram(s) IV Push once  dextrose Oral Gel 15 Gram(s) Oral once PRN  glucagon  Injectable 1 milliGRAM(s) IntraMuscular once  insulin lispro (ADMELOG) corrective regimen sliding scale   SubCutaneous Before meals and at bedtime    Cardiac:    Other Medications:  acetaminophen     Tablet .. 650 milliGRAM(s) Oral every 6 hours PRN  dextrose 5%. 1000 milliLiter(s) IV Continuous <Continuous>  dextrose 5%. 1000 milliLiter(s) IV Continuous <Continuous>  gabapentin 100 milliGRAM(s) Oral three times a day      Allergies    No Known Allergies    Intolerances        Vital Signs Last 24 Hrs  T(C): 36.7 (29 Aug 2023 05:35), Max: 37 (28 Aug 2023 13:45)  T(F): 98.1 (29 Aug 2023 05:35), Max: 98.6 (28 Aug 2023 13:45)  HR: 81 (29 Aug 2023 05:35) (81 - 95)  BP: 111/66 (29 Aug 2023 05:35) (111/66 - 147/86)  BP(mean): --  RR: 17 (29 Aug 2023 05:35) (17 - 18)  SpO2: 96% (29 Aug 2023 05:35) (92% - 96%)    Parameters below as of 29 Aug 2023 05:35  Patient On (Oxygen Delivery Method): nasal cannula  O2 Flow (L/min): 4          PHYSICAL EXAM:  GENERAL: Pleasant, alert and oriented, obese, middle aged female not in any acute distress, appears comfortable.  HEENT: Collar size 16. Nonicteric sclerae, PERRLA, EOMI. Oropharynx clear. Moist mucous membranes. No cervical, supraclavicular, or axillary LAD.  CHEST: Chest wall is nontender.  HEART: Regular rate and rhythm without any appreciable m/r/g.  LUNGS: Breathing comfortably. Difficult to appreciate breath sounds due to body habitus, but appears to have lung fields clear to auscultation bilaterally. No rhonchi, rales, or wheezing appreciated.  ABDOMEN: Soft, normoactive bowel sounds, nontender in all quadrants.  : No suprapubic tenderness to palpation. No CVA tenderness bilaterally.  SKIN: No rash, no excessive bruising, petechiae, or purpura. No dorsal hand thickening,  hands, or digital calcification or ulceration.  NEUROLOGIC: Moves all extremities spontaneously.  EXTREMITIES: 2+ bilateral radial pulses; WWP; No BLE pitting edema, clubbing or cyanosis; BLE varicose veins      LABS:      CBC Full  -  ( 29 Aug 2023 05:30 )  WBC Count : 7.48 K/uL  RBC Count : 4.62 M/uL  Hemoglobin : 14.2 g/dL  Hematocrit : 44.4 %  Platelet Count - Automated : 174 K/uL  Mean Cell Volume : 96.1 fl  Mean Cell Hemoglobin : 30.7 pg  Mean Cell Hemoglobin Concentration : 32.0 gm/dL  Auto Neutrophil # : 5.65 K/uL  Auto Lymphocyte # : 1.11 K/uL  Auto Monocyte # : 0.56 K/uL  Auto Eosinophil # : 0.09 K/uL  Auto Basophil # : 0.02 K/uL  Auto Neutrophil % : 75.5 %  Auto Lymphocyte % : 14.8 %  Auto Monocyte % : 7.5 %  Auto Eosinophil % : 1.2 %  Auto Basophil % : 0.3 %    08-29    136  |  102  |  15  ----------------------------<  120<H>  3.6   |  24  |  0.80    Ca    9.2      29 Aug 2023 05:30  Phos  3.4     08-29  Mg     1.9     08-29    TPro  6.7  /  Alb  3.1<L>  /  TBili  0.7  /  DBili  x   /  AST  54<H>  /  ALT  61<H>  /  AlkPhos  58  08-29          Urinalysis Basic - ( 29 Aug 2023 05:30 )    Color: x / Appearance: x / SG: x / pH: x  Gluc: 120 mg/dL / Ketone: x  / Bili: x / Urobili: x   Blood: x / Protein: x / Nitrite: x   Leuk Esterase: x / RBC: x / WBC x   Sq Epi: x / Non Sq Epi: x / Bacteria: x                RADIOLOGY & ADDITIONAL STUDIES:  CT CHEST   ORDERED BY: YANELIS ZAPATA     PROCEDURE DATE:  08/28/2023          INTERPRETATION:  CLINICAL INFORMATION: Interstitial lung disease    COMPARISON: August 26, 2023    CONTRAST/COMPLICATIONS:  IV Contrast: None  Oral Contrast: None  Complications: None    PROCEDURE:  CT of the Chest was performed. Prone inspiratory and supine expiratory   scans were performed.  Sagittal and coronal reformats were performed.    FINDINGS:    LUNGS AND AIRWAYS: Patent central airways.  Again, bilateral groundglass   and reticular opacities increasing from apices to bases, both peripheral   as well as extending centrally along bronchovascular bundles. Mild   traction bronchiectasis and bronchiolectasis. No honeycombing. Mild   expiratory air trapping.  PLEURA: No pleural effusion.  MEDIASTINUM AND NIRMALA: No lymphadenopathy.  VESSELS: Within normal limits.  HEART: Heart size is normal. No pericardial effusion.  CHEST WALL AND LOWER NECK: Within normal limits.  VISUALIZED UPPERABDOMEN: Again, probable caudate lobe cyst and moderate   hiatal hernia. BONES: Within normal limits.      IMPRESSION:  1. Since August 26, 2023, again interstitial lung disease non-UIP   pattern. Although no subpleural sparing, possibly interstitialpneumonia,   differential includes NSIP associated with connective tissue disorders,   chronic HP or drug toxicity.  **************     CT ANGIO CHEST PULM ART WAWIC   ORDERED BY:   COLT SUÁREZ     PROCEDURE DATE:  08/26/2023          INTERPRETATION:  CONTRAST/COMPLICATIONS:  IV Contrast: Isovue 370  154 cc administered   46 cc discarded  Oral Contrast: NONE  Complications: None reported at time of study completion    CTA (CT angiography) of the CHEST dated 8/26/2023 3:10 PM    INDICATION: dyspnea, recent travel r/o PE    TECHNIQUE: CT angiography of the chest was performed during bolus   injection of intravenous contrast.  Post-processing including the   production of axial and coronal and sagittal multiplanar reformatted   images and coronal maximum intensity projections (MIPs) was performed.    PRIOR STUDY: None.    FINDINGS: No visualized PE.The pulmonary trunk measures 3.3 cm diameter.     The heart is enlarged.     Trace left lateral asymmetric pericardial effusion is seen.     The great vessels are unremarkable. Left vertebral artery arises   directly from the aortic arch.     No mediastinal or hilar lymphadenopathy is seen.    Mediastinal lipomatosis.    Evaluation of the pulmonary parenchyma demonstrates bilateral   interstitial lung disease in the upper, mid and lower zones with   peripheral groundglass opacities and reticulation. Less involvement of   the bilateral upper zones. No traction bronchiectasis or honeycombing.    Bilateral air trapping.     No pleural effusions are seen.    Limited evaluation of the upper abdomen demonstrates 1.6 cm hypodensity   in segment one likely cyst. Small to moderate hiatal hernia.    Evaluation of the osseous structures demonstrates no significant   abnormality.      IMPRESSION:  No visualized PE.    Bilateral interstitial lung disease. Differential diagnosis includes  HP,NSIP, atypical pneumonia.  ****************  TTE 8/28/23 CONCLUSIONS:     1. Normal left and right ventricular size and systolic function.   2. Normal atria.   3. No significant valvular disease.   4. No evidence of pulmonary hypertension.   5. No pericardial effusion.   6. No prior echo is available for comparison.

## 2023-08-30 NOTE — DIETITIAN INITIAL EVALUATION ADULT - OTHER CALCULATIONS
Based on Standards of Care pt within % IBW (206%) thus actual body weight used for all calculations (140#). Fluid recs per team.

## 2023-08-30 NOTE — PROGRESS NOTE ADULT - PROBLEM SELECTOR PLAN 4
CXR revealed ground glass opacity. CTA revealed interstitial lung disease.  Procal 0.09  Anti- SSA 0.4, SSB <0.2, rheumatoid factor quant <10, CCP (-), centromere ab <0.2, anti-ribonuclear >8.0  High resolution CT:  1. Since August 26, 2023, again interstitial lung disease non-UIP pattern. Although no subpleural sparing, possibly interstitial pneumonia, differential includes NSIP associated with connective tissue disorders, chronic HP or drug toxicity.  had a previous CT scan in 06/2023 - told to be negative for PE    - f/u rheumatologic workup: ANCA, ARIANA, anti-dsDNA, complement levels, myomarker panel, and systemic sclerosis panel  - f/u hypersensitivity pneumonitis panel  - continue to monitor sxs  started prednisone 60 mg daily  consider long term immunosuppressant after discharge  pending PFT outpatient  muscle enzymes are normal -

## 2023-08-30 NOTE — DIETITIAN INITIAL EVALUATION ADULT - PROBLEM SELECTOR PLAN 4
CXR revealed ground glass opacity. CTA revealed interstitial lung disease.    - continue to monitor sxs  - f/u procal

## 2023-08-30 NOTE — PROGRESS NOTE ADULT - ASSESSMENT
62 year old female with a past medical history of afib, and sciatica, who presented to [facility] for shortness of breath. Pulmonary consulted for acute hypoxic respiratory failure.     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 Given reticular pattern, in particular in a subpleural pattern with a cranial caudal distrubution, GGOs, and arcade-like sign- this constellation of CT chest findings noted on HRCT favors mainly diagnoses of organizing pneumonia and NSIP 2/2 CTD (highly elevated anti-RNP). Patient may have certainly presented with ILD prior to rheumatological manifestations of MCTD.     Recommend:  -Plan for bronchoscopy today with BAL and TBBX; keep NPO for now  -Start prednisone 40mg qd and bactrim PJP ppx (eg, double strength 1 tablet daily)  -Rheum consult recommended with capillaroscopy  -Will discuss with IP for possible bronchoscopy with BAL ([facility] location) on Wednesday  -F/u rheumatologic workup with ANCA, sjogren antibodies (neg), ARIANA, anti-dsDNA, complement levels (C3 normal, C4 low), RF, centromere abs (neg), RF (neg), anti-CCP (neg), anti RNP (elevated; >8), myomarker panel, and systemic sclerosis panel  -SARS COV-2 spike protein antibody positive  -CRP and ESR elevated  -F/u hypersensitivity pneumonitis panel  -TTE wnl  -ABG, then hyperventilated for 1 minute, and then repeat ABG right after.    -Uploaded prior CT chest from 6/2023 that looks essentially normal.  -PT evaluation and document ambulatory saturation if possible; may require establishment of home oxygen   -Nutrition counseling and weight loss  -Would hold any steroids at the moment so as to increase yield of diagnostic studies, unless patient becomes acutely hypoxic  -Will require dedicated outpatient pulmonary follow up with formal PFTs and a 6MWT.

## 2023-08-30 NOTE — CONSULT NOTE ADULT - ATTENDING COMMENTS
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 No radicular symptoms. Associated raynauds in toes and myalgia. O/E glove distribution of hyperesthesia in both hands. Tinel and phalen neg without median n distribution or cervical radicular distribution of sensory loss or weakness. Concern for a autoimmune nonlength dependent small fiber neuropathy vs bilateral carpal tunnel not evident on exam. No evidence of diabetes or thyroid disease but will complete the work up with B12 and SPEP with IF. Advised outpatient EMG NCS. May consider skin biopsy on outpatient basis depending on results of tests and trajectory of symptoms. 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 No radicular symptoms. Associated raynauds in toes and myalgia. O/E glove distribution of hyperesthesia in both hands. Tinel and phalen neg without median n distribution or cervical radicular distribution of sensory loss or weakness. Concern for a autoimmune nonlength dependent small fiber neuropathy vs bilateral carpal tunnel not evident on exam. No evidence of diabetes or thyroid disease but will complete the work up with B12 and SPEP with IF. Advised outpatient EMG NCS. MRI c spine reviewed which shows cervical spondylosis with C3-C4 and C4-C5 stenosis with neural foraminal stenosis worse at C4-C5, not symptoms and exam are not consistent with cervical radiculopathy or myelopathy. May consider skin biopsy on outpatient basis depending on results of tests and trajectory of symptoms.

## 2023-08-31 DIAGNOSIS — J84.89 OTHER SPECIFIED INTERSTITIAL PULMONARY DISEASES: ICD-10-CM

## 2023-08-31 DIAGNOSIS — M35.1 OTHER OVERLAP SYNDROMES: ICD-10-CM

## 2023-08-31 LAB
A FUMIGATUS IGG SER QL: 4.9 MCG/ML — SIGNIFICANT CHANGE UP
ALBUMIN SERPL ELPH-MCNC: 3 G/DL — LOW (ref 3.3–5)
ALDOLASE SERPL-CCNC: 12.1 U/L — HIGH (ref 3.3–10.3)
ALP SERPL-CCNC: 71 U/L — SIGNIFICANT CHANGE UP (ref 40–120)
ALT FLD-CCNC: 59 U/L — HIGH (ref 10–45)
ALTERNARIA TENUIS/ALTERNATA IGG: <2 MCG/ML — SIGNIFICANT CHANGE UP
ANA PAT FLD IF-IMP: ABNORMAL
ANA TITR SER: ABNORMAL
ANION GAP SERPL CALC-SCNC: 6 MMOL/L — SIGNIFICANT CHANGE UP (ref 5–17)
ANISOCYTOSIS BLD QL: SLIGHT — SIGNIFICANT CHANGE UP
APPEARANCE UR: CLEAR — SIGNIFICANT CHANGE UP
AST SERPL-CCNC: 42 U/L — HIGH (ref 10–40)
AUREOBASIDIUM PULLULANS IGG: <2 MCG/ML — SIGNIFICANT CHANGE UP
BASOPHILS # BLD AUTO: 0 K/UL — SIGNIFICANT CHANGE UP (ref 0–0.2)
BASOPHILS NFR BLD AUTO: 0 % — SIGNIFICANT CHANGE UP (ref 0–2)
BILIRUB SERPL-MCNC: 0.5 MG/DL — SIGNIFICANT CHANGE UP (ref 0.2–1.2)
BILIRUB UR-MCNC: NEGATIVE — SIGNIFICANT CHANGE UP
BUN SERPL-MCNC: 16 MG/DL — SIGNIFICANT CHANGE UP (ref 7–23)
BURR CELLS BLD QL SMEAR: PRESENT — SIGNIFICANT CHANGE UP
CALCIUM SERPL-MCNC: 9.2 MG/DL — SIGNIFICANT CHANGE UP (ref 8.4–10.5)
CHLORIDE SERPL-SCNC: 101 MMOL/L — SIGNIFICANT CHANGE UP (ref 96–108)
CO2 SERPL-SCNC: 24 MMOL/L — SIGNIFICANT CHANGE UP (ref 22–31)
COLOR SPEC: YELLOW — SIGNIFICANT CHANGE UP
CREAT ?TM UR-MCNC: 72 MG/DL — SIGNIFICANT CHANGE UP
CREAT SERPL-MCNC: 0.88 MG/DL — SIGNIFICANT CHANGE UP (ref 0.5–1.3)
DACRYOCYTES BLD QL SMEAR: SLIGHT — SIGNIFICANT CHANGE UP
DIFF PNL FLD: NEGATIVE — SIGNIFICANT CHANGE UP
EGFR: 73 ML/MIN/1.73M2 — SIGNIFICANT CHANGE UP
EOSINOPHIL # BLD AUTO: 0 K/UL — SIGNIFICANT CHANGE UP (ref 0–0.5)
EOSINOPHIL NFR BLD AUTO: 0 % — SIGNIFICANT CHANGE UP (ref 0–6)
GIANT PLATELETS BLD QL SMEAR: PRESENT — SIGNIFICANT CHANGE UP
GLUCOSE BLDC GLUCOMTR-MCNC: 127 MG/DL — HIGH (ref 70–99)
GLUCOSE BLDC GLUCOMTR-MCNC: 136 MG/DL — HIGH (ref 70–99)
GLUCOSE BLDC GLUCOMTR-MCNC: 144 MG/DL — HIGH (ref 70–99)
GLUCOSE BLDC GLUCOMTR-MCNC: 148 MG/DL — HIGH (ref 70–99)
GLUCOSE SERPL-MCNC: 165 MG/DL — HIGH (ref 70–99)
GLUCOSE UR QL: NEGATIVE — SIGNIFICANT CHANGE UP
HCT VFR BLD CALC: 43.9 % — SIGNIFICANT CHANGE UP (ref 34.5–45)
HGB BLD-MCNC: 13.8 G/DL — SIGNIFICANT CHANGE UP (ref 11.5–15.5)
KETONES UR-MCNC: NEGATIVE — SIGNIFICANT CHANGE UP
LACEYELLA SACCHARI IGG: <2 MCG/ML — SIGNIFICANT CHANGE UP
LEUKOCYTE ESTERASE UR-ACNC: NEGATIVE — SIGNIFICANT CHANGE UP
LYMPHOCYTES # BLD AUTO: 0.19 K/UL — LOW (ref 1–3.3)
LYMPHOCYTES # BLD AUTO: 2.7 % — LOW (ref 13–44)
MACROCYTES BLD QL: SLIGHT — SIGNIFICANT CHANGE UP
MAGNESIUM SERPL-MCNC: 2.4 MG/DL — SIGNIFICANT CHANGE UP (ref 1.6–2.6)
MANUAL SMEAR VERIFICATION: SIGNIFICANT CHANGE UP
MCHC RBC-ENTMCNC: 30.4 PG — SIGNIFICANT CHANGE UP (ref 27–34)
MCHC RBC-ENTMCNC: 31.4 GM/DL — LOW (ref 32–36)
MCV RBC AUTO: 96.7 FL — SIGNIFICANT CHANGE UP (ref 80–100)
MICROCYTES BLD QL: SLIGHT — SIGNIFICANT CHANGE UP
MICROPOLYSPORA FAENI IGG: <2 MCG/ML — SIGNIFICANT CHANGE UP
MONOCYTES # BLD AUTO: 0.32 K/UL — SIGNIFICANT CHANGE UP (ref 0–0.9)
MONOCYTES NFR BLD AUTO: 4.4 % — SIGNIFICANT CHANGE UP (ref 2–14)
NEUTROPHILS # BLD AUTO: 6.7 K/UL — SIGNIFICANT CHANGE UP (ref 1.8–7.4)
NEUTROPHILS NFR BLD AUTO: 92.9 % — HIGH (ref 43–77)
NITRITE UR-MCNC: NEGATIVE — SIGNIFICANT CHANGE UP
OSMOLALITY UR: 392 MOSM/KG — SIGNIFICANT CHANGE UP (ref 300–900)
OVALOCYTES BLD QL SMEAR: SLIGHT — SIGNIFICANT CHANGE UP
PENICILLIUM CHRYSOGENUM/NOTATUM IGG: 5.3 MCG/ML — SIGNIFICANT CHANGE UP
PH UR: 6 — SIGNIFICANT CHANGE UP (ref 5–8)
PHOMA BETAE IGG: <2 MCG/ML — SIGNIFICANT CHANGE UP
PHOSPHATE SERPL-MCNC: 4.6 MG/DL — HIGH (ref 2.5–4.5)
PLAT MORPH BLD: ABNORMAL
PLATELET # BLD AUTO: 142 K/UL — LOW (ref 150–400)
POIKILOCYTOSIS BLD QL AUTO: SLIGHT — SIGNIFICANT CHANGE UP
POTASSIUM SERPL-MCNC: 5.2 MMOL/L — SIGNIFICANT CHANGE UP (ref 3.5–5.3)
POTASSIUM SERPL-SCNC: 5.2 MMOL/L — SIGNIFICANT CHANGE UP (ref 3.5–5.3)
POTASSIUM UR-SCNC: 27 MMOL/L — SIGNIFICANT CHANGE UP
PROT ?TM UR-MCNC: 14 MG/DL — HIGH (ref 0–12)
PROT SERPL-MCNC: 7 G/DL — SIGNIFICANT CHANGE UP (ref 6–8.3)
PROT UR-MCNC: NEGATIVE MG/DL — SIGNIFICANT CHANGE UP
PROT/CREAT UR-RTO: 0.2 RATIO — SIGNIFICANT CHANGE UP (ref 0–0.2)
RBC # BLD: 4.54 M/UL — SIGNIFICANT CHANGE UP (ref 3.8–5.2)
RBC # FLD: 14.4 % — SIGNIFICANT CHANGE UP (ref 10.3–14.5)
RBC BLD AUTO: ABNORMAL
SMUDGE CELLS # BLD: PRESENT — SIGNIFICANT CHANGE UP
SODIUM SERPL-SCNC: 131 MMOL/L — LOW (ref 135–145)
SODIUM UR-SCNC: 30 MMOL/L — SIGNIFICANT CHANGE UP
SP GR SPEC: 1.02 — SIGNIFICANT CHANGE UP (ref 1–1.03)
SPHEROCYTES BLD QL SMEAR: SLIGHT — SIGNIFICANT CHANGE UP
TRICHODERMA VIRIDE IGG: 3.4 MCG/ML — SIGNIFICANT CHANGE UP
UROBILINOGEN FLD QL: 0.2 E.U./DL — SIGNIFICANT CHANGE UP
UUN UR-MCNC: 593 MG/DL — SIGNIFICANT CHANGE UP
WBC # BLD: 7.21 K/UL — SIGNIFICANT CHANGE UP (ref 3.8–10.5)
WBC # FLD AUTO: 7.21 K/UL — SIGNIFICANT CHANGE UP (ref 3.8–10.5)

## 2023-08-31 PROCEDURE — 88189 FLOWCYTOMETRY/READ 16 & >: CPT

## 2023-08-31 PROCEDURE — 99222 1ST HOSP IP/OBS MODERATE 55: CPT

## 2023-08-31 RX ORDER — BENZOCAINE AND MENTHOL 5; 1 G/100ML; G/100ML
1 LIQUID ORAL EVERY 4 HOURS
Refills: 0 | Status: DISCONTINUED | OUTPATIENT
Start: 2023-08-31 | End: 2023-09-13

## 2023-08-31 RX ADMIN — PANTOPRAZOLE SODIUM 40 MILLIGRAM(S): 20 TABLET, DELAYED RELEASE ORAL at 06:42

## 2023-08-31 RX ADMIN — GABAPENTIN 300 MILLIGRAM(S): 400 CAPSULE ORAL at 06:42

## 2023-08-31 RX ADMIN — GABAPENTIN 300 MILLIGRAM(S): 400 CAPSULE ORAL at 22:01

## 2023-08-31 RX ADMIN — Medication 40 MILLIGRAM(S): at 06:42

## 2023-08-31 RX ADMIN — Medication 1 TABLET(S): at 11:53

## 2023-08-31 RX ADMIN — GABAPENTIN 300 MILLIGRAM(S): 400 CAPSULE ORAL at 13:30

## 2023-08-31 RX ADMIN — ENOXAPARIN SODIUM 40 MILLIGRAM(S): 100 INJECTION SUBCUTANEOUS at 06:42

## 2023-08-31 RX ADMIN — ENOXAPARIN SODIUM 40 MILLIGRAM(S): 100 INJECTION SUBCUTANEOUS at 17:36

## 2023-08-31 NOTE — PROGRESS NOTE ADULT - PROBLEM SELECTOR PLAN 8
F: none given  E: If k>4 or mag>2 replete prn  N: dash diet  G: protonix  DVT: lovenox  Dispo; 7uris F: none  E: If k>4 or mag>2 replete prn  N: dash diet  G: protonix  DVT: lovenox  Dispo; 7uris

## 2023-08-31 NOTE — PROGRESS NOTE ADULT - SUBJECTIVE AND OBJECTIVE BOX
Physical Medicine and Rehabilitation Progress Note :       Patient is a 62y old  Female who presents with a chief complaint of Shortness of breath (31 Aug 2023 10:05)      HPI:  This is a 62 year old female with a past medical history of afib, and sciatica, who presented to [facility] for shortness of breath. Pt states that she went to the urgent care today where she had an XR done concerning for b/l lower infiltrates, and was driven here to the ED by car from the ambulance. She states that she can only walk 12-15 feet and feels short of breath. She states that she has a pulse ox at home and had readings of 82. She states that she had chronic SOB for several months and got worked up in [place] where she had a CT scan that was negative for PE. She also states that she was seen by a pulmonologist and rheumatology, where they ruled out lupus and other immunological disorders. Pt states that she has a cough with no sputum. Pt denies any uri sxs, chest pain, pain with breathing, abdominal pain, nvd, headaches,     ED Course:  Vitals: 97.6, 97, 113/76, 18 94% 2L nc  Labs: Wbc 9.83 Hb 15.4, Hct 46.1, Plt 222, D-dimer 666, Trop T 123, VBG 7.41 CO2 29.3, RVP neg, Cov2 neg,  Imaging: CTA: no visualized PE  EKhr sinus rhythm  Consult: Cardiology (26 Aug 2023 18:38)                            13.8   7.21  )-----------( 142      ( 31 Aug 2023 05:30 )             43.9       08-31    131<L>  |  101  |  16  ----------------------------<  165<H>  5.2   |  24  |  0.88    Ca    9.2      31 Aug 2023 05:30  Phos  4.6     08-31  Mg     2.4     08-31    TPro  7.0  /  Alb  3.0<L>  /  TBili  0.5  /  DBili  x   /  AST  42<H>  /  ALT  59<H>  /  AlkPhos  71  08-31    Vital Signs Last 24 Hrs  T(C): 36.3 (31 Aug 2023 09:34), Max: 37.3 (30 Aug 2023 20:43)  T(F): 97.4 (31 Aug 2023 09:34), Max: 99.2 (30 Aug 2023 20:43)  HR: 76 (31 Aug 2023 09:34) (76 - 93)  BP: 128/71 (31 Aug 2023 09:34) (117/79 - 128/71)  BP(mean): --  RR: 17 (31 Aug 2023 09:34) (17 - 24)  SpO2: 94% (31 Aug 2023 09:34) (81% - 96%)    Parameters below as of 31 Aug 2023 09:34  Patient On (Oxygen Delivery Method): nasal cannula  O2 Flow (L/min): 5      MEDICATIONS  (STANDING):  dextrose 5%. 1000 milliLiter(s) (50 mL/Hr) IV Continuous <Continuous>  dextrose 5%. 1000 milliLiter(s) (100 mL/Hr) IV Continuous <Continuous>  dextrose 50% Injectable 25 Gram(s) IV Push once  dextrose 50% Injectable 25 Gram(s) IV Push once  dextrose 50% Injectable 12.5 Gram(s) IV Push once  enoxaparin Injectable 40 milliGRAM(s) SubCutaneous every 12 hours  gabapentin 300 milliGRAM(s) Oral every 8 hours  glucagon  Injectable 1 milliGRAM(s) IntraMuscular once  insulin lispro (ADMELOG) corrective regimen sliding scale   SubCutaneous Before meals and at bedtime  pantoprazole    Tablet 40 milliGRAM(s) Oral before breakfast  predniSONE   Tablet 40 milliGRAM(s) Oral daily  trimethoprim  160 mG/sulfamethoxazole 800 mG 1 Tablet(s) Oral daily    MEDICATIONS  (PRN):  acetaminophen     Tablet .. 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 No HHA. Denies history of mechanical falls.    Cognitive Status Examination:   · Orientation	oriented to person, place, time and situation  · Level of Consciousness	alert  · Follows Commands and Answers Questions	100% of the time  · Personal Safety and Judgment	intact    Peripheral Neurovascular:     Neurovascular Assessment:   · LLE	warm; no discoloration; no numbness; no tingling  · RLE	warm; no discoloration; no numbness; no tingling    Range of Motion Exam:   · Active Range of Motion Examination	bilateral upper extremity Active ROM was WFL (within functional limits); bilateral  lower extremity Active ROM was WFL (within functional limits)    Manual Muscle Testing:   · Manual Muscle Testing Results	Grossly assessed with functional movement, bilateral UE/LE greater than or equal to 3+/5    Bed Mobility: Rolling/Turning:     · Level of [place]	independent  · Physical Assist/Nonphysical Assist	rolling to (R) side    Bed Mobility: Scooting/Bridging:     · Level of [place]	independent  · Physical Assist/Nonphysical Assist	scooting to EOB in sitting    Bed Mobility: Sit to Supine:     · Level of [place]	independent  · Assistive Device	HOB < 20 degrees    Bed Mobility: Supine to Sit:     · Level of [place]	independent  · Assistive Device	HOB < 20 degrees    Transfer: Sit to Stand:     · Level of [place]	independent  · Physical Assist/Nonphysical Assist	steady, no LOB/knee buckling noted  · Assistive Device	without assistive device    Transfer: Stand to Sit:     · Level of [place]	independent  · Physical Assist/Nonphysical Assist	good eccentric control upon descent  · Assistive Device	without assistive device    Gait Skills:     · Level of [place]	independent  · Assistive Device	without assistive device  · Gait Distance	~200 feet x 1 (initially SpO2 noted 85% on 5LO2NC; SpO2 noted to desat to 77% on room air after ~100 feet and recovered to only ~87% on 5LO2NC after 1 minute seated rest and deep breathing therex; MD Bartholomew ([facility]s Team 2) made aware)    Gait Analysis:     · Gait Deviations Noted	appropriate tomas/step length, steady gait, no LOB/knee buckling noted; good negotiation through hallway obstacles without gait disturbances noted    Balance Skills Assessment:     · Sitting Balance: Static	independent  · Sitting Balance: Dynamic	independent  · Sit-to-Stand Balance	independent  · Standing Balance: Static	independent  · Standing Balance: Dynamic	independent    Sensory Examination:   Sensory Examination:    Grossly Intact:   · Gross Sensory Examination	Grossly Intact; Left UE; Right UE; Left LE; Right LE; Head/Neck; Trunk      Clinical Impressions:   · Therapy Frequency	Patient educated on discharge from inpatient physical therapy at [facility], patient verbalized understanding.  · Physical Therapy DME Recommendations	no DME needs        PM&R Impression : as above    Current Disposition Plan Recommendations :      d/c home with no PT/OT needs

## 2023-08-31 NOTE — PROGRESS NOTE ADULT - PROBLEM SELECTOR PLAN 2
ARIANA (1:1280 speckled), anti-smith (positive), complement levels (C3 normal, C4 low), anti RNP (elevated; >8)    -Continue prednisone 40mg qd and bactrim PJP ppx (eg, double strength 1 tablet daily)  -SARS COV-2 spike protein antibody positive  -CRP and ESR elevated  -F/u hypersensitivity pneumonitis panel  -TTE wnl  -ABG, then hyperventilated for 1 minute, and then repeat ABG right after.    -Uploaded prior CT chest from 6/2023 that looks essentially normal.  -PT evaluation and document ambulatory saturation if possible; may require establishment of home oxygen   -Nutrition counseling and weight loss  -Will require dedicated outpatient pulmonary follow up with formal PFTs and a 6MWT. ARIANA (1:1280 speckled), anti-smith (positive), complement levels (C3 normal, C4 low), anti RNP (elevated; >8)  SARS COV-2 spike protein antibody positive; CRP and ESR elevated    -Continue prednisone 40mg qd and bactrim PJP ppx (eg, double strength 1 tablet daily)  -F/u hypersensitivity pneumonitis panel  -TTE wnl    -Uploaded prior CT chest from 6/2023 that looks essentially normal.  -PT evaluation and document ambulatory saturation if possible; may require establishment of home oxygen   -Nutrition counseling and weight loss  -Will require dedicated outpatient pulmonary follow up with Dr. Whitney in 2 weeks with formal PFTs and a 6MWT. Repeat CT chest in 6 weeks.  -Will need to walk with suppl O2 prior to discharge; will call 48 hours after discharge to see if the patient is okay. ARIANA (1:1280 speckled), anti-smith (positive), complement levels (C3 normal, C4 low), anti RNP (elevated; >8)  SARS COV-2 spike protein antibody positive; CRP and ESR elevated    Hypersensitivity pneumonitis panel negative  Uploaded prior CT chest from 6/2023 that looks essentially normal.  TTE wnl    -Continue prednisone 40mg qd and bactrim PJP ppx (eg, double strength 1 tablet daily)  -PT evaluation and document ambulatory saturation if possible; will likely require establishment of home oxygen   -Nutrition counseling and weight loss  -Will require dedicated outpatient pulmonary follow up with Dr. Whitney in 2 weeks with formal PFTs and a 6MWT. Repeat CT chest in 6 weeks.  -Will need to walk with suppl O2 prior to discharge; will call 48 hours after discharge to see if the patient is okay.

## 2023-08-31 NOTE — PROGRESS NOTE ADULT - SUBJECTIVE AND OBJECTIVE BOX
PULMONARY CONSULT SERVICE FOLLOW-UP NOTE    INTERVAL HPI:  Reviewed chart and overnight events; patient seen and examined at bedside. No new respiratory complaints today.    MEDICATIONS:  Pulmonary:    Antimicrobials:  trimethoprim  160 mG/sulfamethoxazole 800 mG 1 Tablet(s) Oral daily    Anticoagulants:  enoxaparin Injectable 40 milliGRAM(s) SubCutaneous every 12 hours    Cardiac:      Allergies    No Known Allergies    Intolerances        Vital Signs Last 24 Hrs  T(C): 36.3 (31 Aug 2023 09:34), Max: 37.7 (30 Aug 2023 11:42)  T(F): 97.4 (31 Aug 2023 09:34), Max: 99.2 (30 Aug 2023 20:43)  HR: 76 (31 Aug 2023 09:34) (76 - 99)  BP: 128/71 (31 Aug 2023 09:34) (105/64 - 128/71)  BP(mean): 85 (30 Aug 2023 11:42) (85 - 85)  RR: 17 (31 Aug 2023 09:34) (17 - 24)  SpO2: 94% (31 Aug 2023 09:34) (81% - 98%)    Parameters below as of 31 Aug 2023 09:34  Patient On (Oxygen Delivery Method): nasal cannula  O2 Flow (L/min): 5          PHYSICAL EXAM:  GENERAL: Pleasant, alert and oriented, obese, middle aged female not in any acute distress, appears comfortable. Sitting in chair eating breakfast.  HEENT: Nonicteric sclerae, PERRLA, EOMI. Oropharynx clear. Moist mucous membranes. No cervical, supraclavicular, or axillary LAD.  CHEST: Chest wall is nontender.  HEART: Regular rate and rhythm without any appreciable m/r/g.  LUNGS: Breathing comfortably on low flow nasal cannula. Difficult to appreciate breath sounds due to body habitus  ABDOMEN: Soft, normoactive bowel sounds, nontender in all quadrants.  : No suprapubic tenderness to palpation.  SKIN: No rash, no excessive bruising, petechiae, or purpura.  NEUROLOGIC: Moves all extremities spontaneously.  EXTREMITIES: 2+ bilateral radial pulses; WWP; No BLE pitting edema, clubbing or cyanosis; BLE varicose veins    LABS:  ABG - ( 29 Aug 2023 16:25 )  pH, Arterial: 7.41  pH, Blood: x     /  pCO2: 38    /  pO2: 81    / HCO3: 24    / Base Excess: -0.3  /  SaO2: 97.7                CBC Full  -  ( 31 Aug 2023 05:30 )  WBC Count : 7.21 K/uL  RBC Count : 4.54 M/uL  Hemoglobin : 13.8 g/dL  Hematocrit : 43.9 %  Platelet Count - Automated : 142 K/uL  Mean Cell Volume : 96.7 fl  Mean Cell Hemoglobin : 30.4 pg  Mean Cell Hemoglobin Concentration : 31.4 gm/dL  Auto Neutrophil # : 6.70 K/uL  Auto Lymphocyte # : 0.19 K/uL  Auto Monocyte # : 0.32 K/uL  Auto Eosinophil # : 0.00 K/uL  Auto Basophil # : 0.00 K/uL  Auto Neutrophil % : 92.9 %  Auto Lymphocyte % : 2.7 %  Auto Monocyte % : 4.4 %  Auto Eosinophil % : 0.0 %  Auto Basophil % : 0.0 %    08-31    131<L>  |  101  |  16  ----------------------------<  165<H>  5.2   |  24  |  0.88    Ca    9.2      31 Aug 2023 05:30  Phos  4.6     08-31  Mg     2.4     08-31    TPro  7.0  /  Alb  3.0<L>  /  TBili  0.5  /  DBili  x   /  AST  42<H>  /  ALT  59<H>  /  AlkPhos  71  08-31    PT/INR - ( 30 Aug 2023 06:08 )   PT: 11.9 sec;   INR: 1.05          PTT - ( 30 Aug 2023 06:08 )  PTT:32.7 sec      Urinalysis Basic - ( 31 Aug 2023 05:30 )    Color: x / Appearance: x / SG: x / pH: x  Gluc: 165 mg/dL / Ketone: x  / Bili: x / Urobili: x   Blood: x / Protein: x / Nitrite: x   Leuk Esterase: x / RBC: x / WBC x   Sq Epi: x / Non Sq Epi: x / Bacteria: x                RADIOLOGY & ADDITIONAL STUDIES:

## 2023-08-31 NOTE — PROGRESS NOTE ADULT - NUTRITIONAL ASSESSMENT
This patient has been assessed with a concern for Malnutrition and has been determined to have a diagnosis/diagnoses of Morbid obesity (BMI > 40).    This patient is being managed with:   Diet DASH/TLC-  Sodium & Cholesterol Restricted  Entered: Aug 26 2023  6:49PM

## 2023-08-31 NOTE — PROGRESS NOTE ADULT - ASSESSMENT
{\rtf1\sodrob81125\ansi\odpvulk0989\ftnbj\uc1\deff0  {\fonttbl{\f0 \fnil Segoe UI;}{\f1 \fnil \fcharset0 Segoe UI;}{\f2 \fnil Times New Thanh;}}  {\colortbl ;\jev903\hmldi120\qurl213 ;\red0\green0\blue0 ;\red0\green0\auuw592 ;\red0\green0\blue0 ;}  {\stylesheet{\f0\fs20 Normal;}{\cs1 Default Paragraph Font;}{\cs2\f0\fs16 Line Number;}{\cs3\f2\fs24\ul\cf3 Hyperlink;}}  {\*\revtbl{Unknown;}}  \fnnpje58985\obkavl14338\rgtvt4343\pnxzq7101\glqhm4157\ghhnp0729\gxbiaww154\kkmhbnw689\nogrowautofit\haargy280\formshade\nofeaturethrottle1\dntblnsbdb\fet4\aendnotes\aftnnrlc\pgbrdrhead\pgbrdrfoot  \sectd\lasawn20091\ltvqxv53119\guttersxn0\ibvazttf5522\iwwiihmr7544\vijqdjmk0782\buqjmfua1073\yegptlh486\jgaxtel755\sbkpage\pgncont\pgndec  \plain\plain\f0\fs24\ql\plain\f0\fs24\plain\f0\fs20\flag1095\hich\f0\dbch\f0\loch\f0\fs20 I M\par  \par  62 year old female with a past medical history of afib, and sciatica, who presented to [facility] for shortness of breath found to have tropinemia and interstitial lung disease admitted for hypoxia.\par  \par  \plain\f1\fs20\qqcx5489\hich\f1\dbch\f1\loch\f1\cf2\fs20\ul{\field{\*\fldinst HYPERLINK [id number] }{\fldrslt Nutritional Assessment:}}\plain\f0\fs20\eawv1573\hich\f0\dbch\f0\loch\f0\fs20\ql\par  \trowd\uwwgmz79\lastrow\mgipoip34\trpaddfl3\hmwjssy52\trpaddfr3\trpaddt0\trpaddft3\trpaddb0\trpaddfb3\trleft0  \clvertalt\stiemw39\clpadft3\fujrre52\clpadfr3\clpadl0\clpadfl3\clpadb0\clpadfb3\fqoof6329  \clvertalt\elwnyg52\clpadft3\guqdnw61\clpadfr3\clpadl0\clpadfl3\clpadb0\clpadfb3\tfirl0766  \pard\intbl\ssparaaux0\s0\fi-120\li120\ql\plain\f0\fs24{\*\bkmkstart [id number]}{\*\bkmkend [id number]}\plain\f0\fs20\xdnh7894\hich\f0\dbch\f0\loch\f0\fs20 \'b7 Nutritional Assessment\cell  \pard\intbl\ssparaaux0\s0\ql\plain\f0\fs24\plain\f0\fs20\bbao0660\hich\f0\dbch\f0\loch\f0\fs20 This patient has been assessed with a concern for Malnutrition and has been determined to have a diagnosis/diagnoses of Morbid obesity (BMI > 40).\par  \par  This patient is being managed with: \par  Diet DASH/TLC-\par  Sodium & Cholesterol Restricted\par  Entered: Aug 26 2023  6:49PM\cell  \intbl\row  \pard\ssparaaux0\s0\ql\plain\f0\fs24\plain\f0\fs20\xfns7558\hich\f0\dbch\f0\loch\f0\fs20\par  \plain\f1\fs20\ymuj8964\hich\f1\dbch\f1\loch\f1\cf2\fs20\ul{\field{\*\fldinst HYPERLINK [id number] }{\fldrslt Problem/Plan - 1:}}\plain\f0\fs20\eknr5297\hich\f0\dbch\f0\loch\f0\fs20\ql\par  \'b7  {\*\bkmkstart uz42048741999}{\*\bkmkend ij83110194022}Problem: {\*\bkmkstart mo95690617800}{\*\bkmkend sf14073430503}Acute respiratory failure with hypoxia.\plain\f1\fs20\mllk4974\hich\f1\dbch\f1\loch\f1\cf2\fs20\strike\plain\f0\fs20\lrth6607\hich\f0\dbch\f0\loch\f0\fs20\par  \'b7  {\*\bkmkstart wf16525099707}{\*\bkmkend ay14225720550}Plan: {\*\bkmkstart fb28699293136}{\*\bkmkend vm06643647736}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 RVP, cov negative. Hb and Hct elevated   in setting of  chronic sob\par  CXR: pulmonary infiltrates\par  CTA: Bilateral interstitial lung disease. Differential diagnosis includes HP, NSIP, atypical pneumonia.\par  \par  Possibly idiopathic pulmonary fibrosis with chronic duration given CTA findings and CXR. 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   Symptoms have not improved with prednisone 20mg BID, may be less likely ILD secondary to rheumatological causes. Less likely interstitial pneumonia given asymptomatic and wbc wnl. Physical exam not notable for connective tissue diseases. \par  Procal 0.09, legionella (-), strep pneumo (-)\par  8/28: patient O2 requirement 3L NC -> 4L NC\par  High resolution CT findings in problem 4 ground glass below\par  s/p bronchoscopy with BAL and biopsy\par  \par  - f/u pulm consult\par  - restarting prednisone 40mg qD\par  - bactrim 1 DS qd\par  - incentive spirometer\par  - wean o2 as appropriate.\par  \par  \plain\f1\fs20\hfgu8266\hich\f1\dbch\f1\loch\f1\cf2\fs20\ul{\field{\*\fldinst HYPERLINK [id number] }{\fldrslt Problem/Plan - 2:}}\plain\f0\fs20\aenu9160\hich\f0\dbch\f0\loch\f0\fs20\ql\par  \'b7  {\*\bkmkstart vd46507959538}{\*\bkmkend mu69317964046}Problem: {\*\bkmkstart iu25362079285}{\*\bkmkend pk93343254199}NSIP (nonspecific interstitial pneumonia).\plain\f1\fs20\gldc1111\hich\f1\dbch\f1\loch\f1\cf2\fs20\strike\plain\f0\fs20\pwdi1988\hich\f0\dbch\f0\loch\f0\fs20\par  \'b7  {\*\bkmkstart gv65756932923}{\*\bkmkend ni80565520211}Plan: {\*\bkmkstart oc42623583768}{\*\bkmkend ce45900888932}CXR revealed ground glass opacity. CTA revealed interstitial lung disease.\par  Procal 0.09\par  Anti- SSA 0.4, SSB <0.2, rheumatoid factor quant <10, CCP (-), centromere ab <0.2, antiribonucleoprotein >8.0\par  c-ANCA (-), p-ANCA (-), atypical ANCA indeterminate, C4 12, C3 99, ARIANA speckled, Anti-nuclear factor titer 1:1280\par  DS DNA <12, Hypersensitivity pneumonitis panel wnl\par  \par  High resolution CT:\par  1. Since August 26, 2023, again interstitial lung disease non-UIP pattern. Although no subpleural sparing, possibly interstitial pneumonia, differential includes NSIP associated with connective tissue disorders, chronic HP or drug toxicity.\par  \par  - f/u rheum consult\par  - f/u rheumatologic workup: myomarker panel, and systemic sclerosis panel\par  - f/u hypersensitivity pneumonitis panel\par  - continue to monitor sxs.\plain\f1\fs20\qfch9795\hich\f1\dbch\f1\loch\f1\cf2\fs20\strike\plain\f0\fs20\jniz9864\hich\f0\dbch\f0\loch\f0\fs20\par  \par  \plain\f1\fs20\kgdr2919\hich\f1\dbch\f1\loch\f1\cf2\fs20\ul{\field{\*\fldinst HYPERLINK [id number] }{\fldrslt Problem/Plan - 3:}}\plain\f0\fs20\uulz6985\hich\f0\dbch\f0\loch\f0\fs20\ql\par  \'b7  {\*\bkmkstart qd03664022333}{\*\bkmkend ku89136926143}Problem: {\*\bkmkstart ji58061745832}{\*\bkmkend or41196348867}Demand ischemia.\plain\f1\fs20\vnrt5727\hich\f1\dbch\f1\loch\f1\cf2\fs20\strike\plain\f0\fs20\ewgk4490\hich\f0\dbch\f0\loch\f0\fs20\par  \'b7  {\*\bkmkstart hc21951584705}{\*\bkmkend cg20661905030}Plan: {\*\bkmkstart zn61135946195}{\*\bkmkend uc51495729455}No acs sxs. Elevated troponin, downtrending. In the setting of chronic sob and dyspnea on exertion. BNP wnl\par  A1c 6.1\par  Cholesterol 152, triglycerides 104, HDL 32, LDL 99\par  TTE:\par  1. Normal left and right ventricular size and systolic function.\par  2. Normal atria.\par  3. No significant valvular disease.\par  4. No evidence of pulmonary hypertension.\par  5. No pericardial effusion.\par  6. No prior echo is available for comparison.\par  \par  - f/c cardiology rec - recommending work up for ILD in patient with normal TTE outpatient in June.\plain\f1\fs20\txfe2111\hich\f1\dbch\f1\loch\f1\cf2\fs20\strike\plain\f0\fs20\dbkr6997\hich\f0\dbch\f0\loch\f0\fs20\par  \par  \plain\f1\fs20\mrqb8748\hich\f1\dbch\f1\loch\f1\cf2\fs20\ul{\field{\*\fldinst HYPERLINK [id number] }{\fldrslt Problem/Plan - 4:}}\plain\f0\fs20\jwrp9260\hich\f0\dbch\f0\loch\f0\fs20\ql\par  \'b7  {\*\bkmkstart av89151198319}{\*\bkmkend sb02461914499}Problem: {\*\bkmkstart dl68491725892}{\*\bkmkend vm25781042986}Elevated troponin.\plain\f1\fs20\nswj9503\hich\f1\dbch\f1\loch\f1\cf2\fs20\strike\plain\f0\fs20\bvmw8984\hich\f0\dbch\f0\loch\f0\fs20\par  \'b7  {\*\bkmkstart mz99082050055}{\*\bkmkend sz92330546059}Plan: {\*\bkmkstart tw47429408217}{\*\bkmkend fy05083972290}Trops downtrending. Troponin may be elevated in the setting of demand ischemia. Pt has history of chronic SOB. BNP wnl\par  \par  - f/u cardiology rec\par  - continue to monitor symptoms.\plain\f1\fs20\sxnx5268\hich\f1\dbch\f1\loch\f1\cf2\fs20\strike\plain\f0\fs20\dyls2428\hich\f0\dbch\f0\loch\f0\fs20\par  \par  \plain\f1\fs20\wnbr2410\hich\f1\dbch\f1\loch\f1\cf2\fs20\ul{\field{\*\fldinst HYPERLINK [id number] }{\fldrslt Problem/Plan - 5:}}\plain\f0\fs20\vtfq6758\hich\f0\dbch\f0\loch\f0\fs20\ql\par  \'b7  {\*\bkmkstart lb92233471249}{\*\bkmkend tl79801853434}Problem: {\*\bkmkstart td91297437516}{\*\bkmkend sk00110176006}Transaminitis. \par  \'b7  {\*\bkmkstart sz22795370694}{\*\bkmkend xy72135600079}Plan: {\*\bkmkstart lo01662330517}{\*\bkmkend vz32009005099}AST 58, ALT 71. May be elevated in the setting of decreased perfusion. Cr wnl\par  Hep panel non-reactive \par  \par  - trend cmp daily.\par  \par  \plain\f1\fs20\sbfj2517\hich\f1\dbch\f1\loch\f1\cf2\fs20\ul{\field{\*\fldinst HYPERLINK [id number] }{\fldrslt Problem/Plan - 6:}}\plain\f0\fs20\hezo3595\hich\f0\dbch\f0\loch\f0\fs20\ql\par  \'b7  {\*\bkmkstart nd42630094455}{\*\bkmkend we38256719427}Problem: {\*\bkmkstart od53192950899}{\*\bkmkend pl62268435405}Afib. \par  \'b7  {\*\bkmkstart ac33772031815}{\*\bkmkend uu83115146559}Plan: {\*\bkmkstart ku24709654332}{\*\bkmkend sq43693097980}Pt states she had afib 1 month ago. Has not been taking medications.\par  \par  - not currently in afib\par  - consider lopressor 12.5.\par  \par  \plain\f1\fs20\ejzm9984\hich\f1\dbch\f1\loch\f1\cf2\fs20\ul{\field{\*\fldinst HYPERLINK [id number] }{\fldrslt Problem/Plan - 7:}}\plain\f0\fs20\rklf6819\hich\f0\dbch\f0\loch\f0\fs20\ql\par  \'b7  {\*\bkmkstart ki33865288834}{\*\bkmkend az90605111441}Problem: {\*\bkmkstart in08559383232}{\*\bkmkend bn67696883980}Sciatica. \par  \'b7  {\*\bkmkstart sh58213610045}{\*\bkmkend do18808759101}Plan: {\*\bkmkstart lw36969339600}{\*\bkmkend vw28052677982}#BL hand and arm numbness 2/2 to radiculopathy\par  Patient states that numbness in the BL UE has been increasing due to sleeping on hospital mattress.\par  Home meds prednisone 20mg BID, gabapentin 100g TID.\par  \par  - f/u neuro recs\par  - prednisone 40 mg qD\par  - gabapentin increased 100 mg -> 300 mg TID\par  - PT consulted.\par  \par  \plain\f1\fs20\mpcb3521\hich\f1\dbch\f1\loch\f1\cf2\fs20\ul{\field{\*\fldinst HYPERLINK [id number] }{\fldrslt Problem/Plan - 8:}}\plain\f0\fs20\ipoz3143\hich\f0\dbch\f0\loch\f0\fs20\ql\par  \'b7  {\*\bkmkstart [id number]}{\*\bkmkend [id number]}Problem: {\*\bkmkstart [id number]}{\*\bkmkend [id number]}Prophylactic measure. \par  \'b7  {\*\bkmkstart [id number]}{\*\bkmkend [id number]}Plan: {\*\bkmkstart [id number]}{\*\bkmkend [id number]}F: none given\par  E: If k>4 or mag>2 replete prn\par  N: dash diet\par  G: protonix\par  DVT: lovenox\par  Dispo; 7uris.\par  }

## 2023-08-31 NOTE — DISCHARGE NOTE NURSING/CASE MANAGEMENT/SOCIAL WORK - PATIENT PORTAL LINK FT
You can access the FollowMyHealth Patient Portal offered by [facility] by registering at the following website: [email or web address]. By joining [employer]’s FollowMyHealth portal, you will also be able to view your health information using other applications (apps) compatible with our system.

## 2023-08-31 NOTE — PROGRESS NOTE ADULT - PROBLEM SELECTOR PLAN 2
No acs sxs. Elevated troponin, downtrending. In the setting of chronic sob and dyspnea on exertion. BNP wnl  A1c 6.1  Cholesterol 152, triglycerides 104, HDL 32, LDL 99  TTE:  1. Normal left and right ventricular size and systolic function.  2. Normal atria.  3. No significant valvular disease.  4. No evidence of pulmonary hypertension.  5. No pericardial effusion.  6. No prior echo is available for comparison.    - f/c cardiology rec - recommending work up for ILD in patient with normal TTE outpatient in June. CXR revealed ground glass opacity. CTA revealed interstitial lung disease.  Procal 0.09  Anti- SSA 0.4, SSB <0.2, rheumatoid factor quant <10, CCP (-), centromere ab <0.2, antiribonucleoprotein >8.0  c-ANCA (-), p-ANCA (-), atypical ANCA indeterminate, C4 12, C3 99, ARIANA speckled, Anti-nuclear factor titer 1:1280  DS DNA <12, Hypersensitivity pneumonitis panel wnl    High resolution CT:  1. Since August 26, 2023, again interstitial lung disease non-UIP pattern. Although no subpleural sparing, possibly interstitial pneumonia, differential includes NSIP associated with connective tissue disorders, chronic HP or drug toxicity.    - f/u rheum consult  - f/u rheumatologic workup: myomarker panel, and systemic sclerosis panel  - f/u hypersensitivity pneumonitis panel  - continue to monitor sxs CXR revealed ground glass opacity. CTA revealed interstitial lung disease.  Procal 0.09  Anti- SSA 0.4, SSB <0.2, rheumatoid factor quant <10, CCP (-), centromere ab <0.2, antiribonucleoprotein >8.0  c-ANCA (-), p-ANCA (-), atypical ANCA indeterminate, ARIANA speckled, Anti-nuclear factor titer 1:1280, DS DNA <12,   Alejandro  C4 12, C3 99,     Hypersensitivity pneumonitis panel wnl    High resolution CT:  1. Since August 26, 2023, again interstitial lung disease non-UIP pattern. Although no subpleural sparing, possibly interstitial pneumonia, differential includes NSIP associated with connective tissue disorders, chronic HP or drug toxicity.    - f/u rheum consult  - f/u rheumatologic workup: myomarker panel, and systemic sclerosis panel  - continue to monitor sxs CXR revealed ground glass opacity. CTA revealed interstitial lung disease.  Procal 0.09  Anti- SSA 0.4, SSB <0.2, rheumatoid factor quant <10, CCP (-), centromere ab <0.2, antiribonucleoprotein >8.0  c-ANCA (-), p-ANCA (-), atypical ANCA indeterminate, RAIANA speckled, Anti-nuclear factor titer 1:1280, DS DNA <12,   Alejandro >8, RNA polymerase III IG 16,   C4 12, C3 99,   Hypersensitivity pneumonitis panel wnl    High resolution CT:  1. Since August 26, 2023, again interstitial lung disease non-UIP pattern. Although no subpleural sparing, possibly interstitial pneumonia, differential includes NSIP associated with connective tissue disorders, chronic HP or drug toxicity.    - f/u rheum consult  - f/u rheumatologic workup: myomarker panel, and systemic sclerosis panel  - continue to monitor sxs

## 2023-08-31 NOTE — PROGRESS NOTE ADULT - PROBLEM SELECTOR PLAN 3
Trops downtrending. Troponin may be elevated in the setting of demand ischemia. Pt has history of chronic SOB. BNP wnl    - f/u cardiology rec  - continue to monitor symptoms No acs sxs. Elevated troponin, downtrending. In the setting of chronic sob and dyspnea on exertion. BNP wnl  A1c 6.1  Cholesterol 152, triglycerides 104, HDL 32, LDL 99  TTE:  1. Normal left and right ventricular size and systolic function.  2. Normal atria.  3. No significant valvular disease.  4. No evidence of pulmonary hypertension.  5. No pericardial effusion.  6. No prior echo is available for comparison.    - f/c cardiology rec - recommending work up for ILD in patient with normal TTE outpatient in June.

## 2023-08-31 NOTE — PROGRESS NOTE ADULT - PROBLEM SELECTOR PLAN 1
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 RVP, cov negative. Hb and Hct elevated in setting of  chronic sob  CXR: pulmonary infiltrates  CTA: Bilateral interstitial lung disease. Differential diagnosis includes HP, NSIP, atypical pneumonia.    Possibly idiopathic pulmonary fibrosis with chronic duration given CTA findings and CXR. 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 Symptoms have not improved with prednisone 20mg BID, may be less likely ILD secondary to rheumatological causes. Less likely interstitial pneumonia given asymptomatic and wbc wnl. Physical exam not notable for connective tissue diseases.   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 RVP, cov negative. Hb and Hct elevated in setting of  chronic sob  CXR: pulmonary infiltrates  CTA: Bilateral interstitial lung disease. Differential diagnosis includes HP, NSIP, atypical pneumonia.    Possibly idiopathic pulmonary fibrosis with chronic duration given CTA findings and CXR. 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 Symptoms have not improved with prednisone 20mg BID, may be less likely ILD secondary to rheumatological causes. Less likely interstitial pneumonia given asymptomatic and wbc wnl. Physical exam not notable for connective tissue diseases.   Procal 0.09, legionella (-), strep pneumo (-)  8/28: patient O2 requirement 3L NC -> 4L NC  High resolution CT findings in problem 4 ground glass below  s/p bronchoscopy with BAL and biopsy    - f/u pulm consult  - c/w prednisone 40mg qD  - bactrim 1 DS qd PJP prophylaxis   - incentive spirometer  - wean o2 as appropriate

## 2023-08-31 NOTE — PROGRESS NOTE ADULT - PROBLEM SELECTOR PLAN 4
CXR revealed ground glass opacity. CTA revealed interstitial lung disease.  Procal 0.09  Anti- SSA 0.4, SSB <0.2, rheumatoid factor quant <10, CCP (-), centromere ab <0.2, anti-ribonuclear >8.0  High resolution CT:  1. Since August 26, 2023, again interstitial lung disease non-UIP pattern. Although no subpleural sparing, possibly interstitial pneumonia, differential includes NSIP associated with connective tissue disorders, chronic HP or drug toxicity.    - f/u rheum consult  - f/u rheumatologic workup: ANCA, ARIANA, anti-dsDNA, complement levels, myomarker panel, and systemic sclerosis panel  - f/u hypersensitivity pneumonitis panel  - continue to monitor sxs Trops downtrending. Troponin may be elevated in the setting of demand ischemia. Pt has history of chronic SOB. BNP wnl    - f/u cardiology rec  - continue to monitor symptoms

## 2023-08-31 NOTE — CHART NOTE - NSCHARTNOTEFT_GEN_A_CORE
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

## 2023-08-31 NOTE — PROGRESS NOTE ADULT - PROBLEM SELECTOR PLAN 7
#BL hand and arm numbness 2/2 to radiculopathy  Patient states that numbness in the BL UE has been increasing due to sleeping on hospital mattress.  Home meds prednisone 20mg BID, gabapentin 100g TID.    - f/u neuro recs  - prednisone 40 mg qD  - gabapentin increased 100 mg -> 300 mg TID  - PT consulted #BL hand and arm numbness 2/2 to radiculopathy  Patient states that numbness in the BL UE has been increasing due to sleeping on hospital mattress.  Home meds prednisone 20mg BID, gabapentin 100g TID.    - f/u neuro recs  - c/w prednisone 40 mg qD  - c/w gabapentin increased 300 mg TID  - PT consulted #BL hand and arm numbness 2/2 to radiculopathy  Patient states that numbness in the BL UE has been increasing due to sleeping on hospital mattress.  Home meds prednisone 20mg BID, gabapentin 100g TID.    - f/u neuro recs  - c/w prednisone 40 mg qD  - c/w gabapentin 300 mg TID  - PT consulted

## 2023-08-31 NOTE — PROGRESS NOTE ADULT - PROBLEM SELECTOR PLAN 1
Presenting in acute respiratory failure, with imaging should bilateral reticulations and ground glass opacities. Given reticular pattern, in particular in a subpleural pattern with a cranial caudal distrubution, GGOs, and arcade-like sign- this constellation of CT chest findings noted on HRCT favors mainly diagnoses of organizing pneumonia and NSIP 2/2 CTD (highly elevated anti-RNP). Patient may have certainly presented with ILD prior to rheumatological manifestations of MCTD.

## 2023-08-31 NOTE — PROGRESS NOTE ADULT - SUBJECTIVE AND OBJECTIVE BOX
O/N Events: patient refused finger sticks overnight    Subjective/ROS: Patient seen and examined at chairside. The patient states that her numbness in the BL hands is the same despite increase in gabapentin from 100 mg -> 300 mg.   Patient stated that she had 2 episodes of diarrhea this morning that she attributes these episodes to restarting her prednisone 40 mg    Denies Fever/Chills, HA, CP, n/v, changes in urinary habits.  12pt ROS otherwise negative.    VITALS  Vital Signs Last 24 Hrs  T(C): 36.3 (31 Aug 2023 09:34), Max: 37.3 (30 Aug 2023 20:43)  T(F): 97.4 (31 Aug 2023 09:34), Max: 99.2 (30 Aug 2023 20:43)  HR: 76 (31 Aug 2023 09:34) (76 - 93)  BP: 128/71 (31 Aug 2023 09:34) (117/79 - 128/71)  BP(mean): --  RR: 17 (31 Aug 2023 09:34) (17 - 24)  SpO2: 94% (31 Aug 2023 09:34) (81% - 96%)    Parameters below as of 31 Aug 2023 09:34  Patient On (Oxygen Delivery Method): nasal cannula  O2 Flow (L/min): 5      CAPILLARY BLOOD GLUCOSE      POCT Blood Glucose.: 127 mg/dL (31 Aug 2023 13:08)  POCT Blood Glucose.: 136 mg/dL (31 Aug 2023 09:31)      PHYSICAL EXAM  General: NAD  Head: NC/AT; MMM; PERRL; EOMI;  Neck: Supple; no JVD  Respiratory: CTAB; no wheezes/rales/rhonchi  Cardiovascular: Regular rhythm/rate; S1/S2+, no murmurs, rubs gallops   Gastrointestinal: Soft; NTND; bowel sounds normal and present  Extremities: WWP; no edema/cyanosis  Neurological: A&Ox3, CNII-XII grossly intact; no obvious focal deficits    MEDICATIONS  (STANDING):  dextrose 5%. 1000 milliLiter(s) (50 mL/Hr) IV Continuous <Continuous>  dextrose 5%. 1000 milliLiter(s) (100 mL/Hr) IV Continuous <Continuous>  dextrose 50% Injectable 25 Gram(s) IV Push once  dextrose 50% Injectable 25 Gram(s) IV Push once  dextrose 50% Injectable 12.5 Gram(s) IV Push once  enoxaparin Injectable 40 milliGRAM(s) SubCutaneous every 12 hours  gabapentin 300 milliGRAM(s) Oral every 8 hours  glucagon  Injectable 1 milliGRAM(s) IntraMuscular once  insulin lispro (ADMELOG) corrective regimen sliding scale   SubCutaneous Before meals and at bedtime  pantoprazole    Tablet 40 milliGRAM(s) Oral before breakfast  predniSONE   Tablet 40 milliGRAM(s) Oral daily  trimethoprim  160 mG/sulfamethoxazole 800 mG 1 Tablet(s) Oral daily    MEDICATIONS  (PRN):  acetaminophen     Tablet .. 650 milliGRAM(s) Oral every 6 hours PRN Temp greater or equal to 38C (100.4F), Mild Pain (1 - 3)  dextrose Oral Gel 15 Gram(s) Oral once PRN Blood Glucose LESS THAN 70 milliGRAM(s)/deciliter      No Known Allergies      LABS                        13.8   7.21  )-----------( 142      ( 31 Aug 2023 05:30 )             43.9     08-31    131<L>  |  101  |  16  ----------------------------<  165<H>  5.2   |  24  |  0.88    Ca    9.2      31 Aug 2023 05:30  Phos  4.6     08-31  Mg     2.4     08-31    TPro  7.0  /  Alb  3.0<L>  /  TBili  0.5  /  DBili  x   /  AST  42<H>  /  ALT  59<H>  /  AlkPhos  71  08-31    PT/INR - ( 30 Aug 2023 06:08 )   PT: 11.9 sec;   INR: 1.05          PTT - ( 30 Aug 2023 06:08 )  PTT:32.7 sec  Urinalysis Basic - ( 31 Aug 2023 05:30 )    Color: x / Appearance: x / SG: x / pH: x  Gluc: 165 mg/dL / Ketone: x  / Bili: x / Urobili: x   Blood: x / Protein: x / Nitrite: x   Leuk Esterase: x / RBC: x / WBC x   Sq Epi: x / Non Sq Epi: x / Bacteria: x      CARDIAC MARKERS ( 30 Aug 2023 06:08 )  x     / x     / 120 U/L / x     / x              IMAGING/EKG/ETC

## 2023-08-31 NOTE — PROGRESS NOTE ADULT - ASSESSMENT
62 year old female with a past medical history of afib, and sciatica, who presented to [facility] for shortness of breath found to have tropinemia and interstitial lung disease admitted for hypoxia.

## 2023-08-31 NOTE — PROGRESS NOTE ADULT - ASSESSMENT
62 year old female with a past medical history of afib, and sciatica, who presented to [facility] for shortness of breath. Pulmonary consulted for acute hypoxic respiratory failure.     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

## 2023-09-01 LAB
ALBUMIN SERPL ELPH-MCNC: 3.2 G/DL — LOW (ref 3.3–5)
ALP SERPL-CCNC: 77 U/L — SIGNIFICANT CHANGE UP (ref 40–120)
ALT FLD-CCNC: 59 U/L — HIGH (ref 10–45)
ANION GAP SERPL CALC-SCNC: 8 MMOL/L — SIGNIFICANT CHANGE UP (ref 5–17)
AST SERPL-CCNC: 53 U/L — HIGH (ref 10–40)
BASOPHILS # BLD AUTO: 0.02 K/UL — SIGNIFICANT CHANGE UP (ref 0–0.2)
BASOPHILS NFR BLD AUTO: 0.2 % — SIGNIFICANT CHANGE UP (ref 0–2)
BILIRUB SERPL-MCNC: 0.4 MG/DL — SIGNIFICANT CHANGE UP (ref 0.2–1.2)
BUN SERPL-MCNC: 20 MG/DL — SIGNIFICANT CHANGE UP (ref 7–23)
CALCIUM SERPL-MCNC: 9.6 MG/DL — SIGNIFICANT CHANGE UP (ref 8.4–10.5)
CENP-A: <11 SI — SIGNIFICANT CHANGE UP
CENP-B: <11 SI — SIGNIFICANT CHANGE UP
CHLORIDE SERPL-SCNC: 102 MMOL/L — SIGNIFICANT CHANGE UP (ref 96–108)
CO2 SERPL-SCNC: 25 MMOL/L — SIGNIFICANT CHANGE UP (ref 22–31)
CREAT SERPL-MCNC: 0.93 MG/DL — SIGNIFICANT CHANGE UP (ref 0.5–1.3)
CULTURE RESULTS: SIGNIFICANT CHANGE UP
EGFR: 69 ML/MIN/1.73M2 — SIGNIFICANT CHANGE UP
EOSINOPHIL # BLD AUTO: 0.57 K/UL — HIGH (ref 0–0.5)
EOSINOPHIL NFR BLD AUTO: 5.1 % — SIGNIFICANT CHANGE UP (ref 0–6)
FIBRILLARIN: <11 SI — SIGNIFICANT CHANGE UP
FUNGITELL B-D-GLUCAN,  BRONCHIAL LAVAGE: SIGNIFICANT CHANGE UP
GLUCOSE BLDC GLUCOMTR-MCNC: 105 MG/DL — HIGH (ref 70–99)
GLUCOSE BLDC GLUCOMTR-MCNC: 108 MG/DL — HIGH (ref 70–99)
GLUCOSE BLDC GLUCOMTR-MCNC: 145 MG/DL — HIGH (ref 70–99)
GLUCOSE BLDC GLUCOMTR-MCNC: 156 MG/DL — HIGH (ref 70–99)
GLUCOSE SERPL-MCNC: 94 MG/DL — SIGNIFICANT CHANGE UP (ref 70–99)
HCT VFR BLD CALC: 44.7 % — SIGNIFICANT CHANGE UP (ref 34.5–45)
HGB BLD-MCNC: 14.2 G/DL — SIGNIFICANT CHANGE UP (ref 11.5–15.5)
IMM GRANULOCYTES NFR BLD AUTO: 0.6 % — SIGNIFICANT CHANGE UP (ref 0–0.9)
LYMPHOCYTES # BLD AUTO: 1.03 K/UL — SIGNIFICANT CHANGE UP (ref 1–3.3)
LYMPHOCYTES # BLD AUTO: 9.2 % — LOW (ref 13–44)
MAGNESIUM SERPL-MCNC: 2 MG/DL — SIGNIFICANT CHANGE UP (ref 1.6–2.6)
MCHC RBC-ENTMCNC: 30.9 PG — SIGNIFICANT CHANGE UP (ref 27–34)
MCHC RBC-ENTMCNC: 31.8 GM/DL — LOW (ref 32–36)
MCV RBC AUTO: 97.4 FL — SIGNIFICANT CHANGE UP (ref 80–100)
MONOCYTES # BLD AUTO: 0.48 K/UL — SIGNIFICANT CHANGE UP (ref 0–0.9)
MONOCYTES NFR BLD AUTO: 4.3 % — SIGNIFICANT CHANGE UP (ref 2–14)
NEUTROPHILS # BLD AUTO: 9.06 K/UL — HIGH (ref 1.8–7.4)
NEUTROPHILS NFR BLD AUTO: 80.6 % — HIGH (ref 43–77)
NRBC # BLD: 0 /100 WBCS — SIGNIFICANT CHANGE UP (ref 0–0)
PHOSPHATE SERPL-MCNC: 3.7 MG/DL — SIGNIFICANT CHANGE UP (ref 2.5–4.5)
PLATELET # BLD AUTO: 171 K/UL — SIGNIFICANT CHANGE UP (ref 150–400)
PM/SCL-100: <11 SI — SIGNIFICANT CHANGE UP
PM/SCL-75: <11 SI — SIGNIFICANT CHANGE UP
POTASSIUM SERPL-MCNC: 4.7 MMOL/L — SIGNIFICANT CHANGE UP (ref 3.5–5.3)
POTASSIUM SERPL-SCNC: 4.7 MMOL/L — SIGNIFICANT CHANGE UP (ref 3.5–5.3)
PROT SERPL-MCNC: 7 G/DL — SIGNIFICANT CHANGE UP (ref 6–8.3)
RBC # BLD: 4.59 M/UL — SIGNIFICANT CHANGE UP (ref 3.8–5.2)
RBC # FLD: 14.6 % — HIGH (ref 10.3–14.5)
RP11: <11 SI — SIGNIFICANT CHANGE UP
RP155: <11 SI — SIGNIFICANT CHANGE UP
SCL-70: <11 SI — SIGNIFICANT CHANGE UP
SODIUM SERPL-SCNC: 135 MMOL/L — SIGNIFICANT CHANGE UP (ref 135–145)
SPECIMEN SOURCE: SIGNIFICANT CHANGE UP
SURGICAL PATHOLOGY STUDY: SIGNIFICANT CHANGE UP
TH/TO: <11 SI — SIGNIFICANT CHANGE UP
U1-SNRNP RNP A: 162 SI — HIGH
U1-SNRNP RNP C: 71 SI — HIGH
U1-SNRNP RNP-70KD: 111 SI — HIGH
WBC # BLD: 11.23 K/UL — HIGH (ref 3.8–10.5)
WBC # FLD AUTO: 11.23 K/UL — HIGH (ref 3.8–10.5)

## 2023-09-01 PROCEDURE — 99232 SBSQ HOSP IP/OBS MODERATE 35: CPT | Mod: GC

## 2023-09-01 PROCEDURE — 99233 SBSQ HOSP IP/OBS HIGH 50: CPT | Mod: GC

## 2023-09-01 RX ORDER — DIPHENHYDRAMINE HYDROCHLORIDE AND LIDOCAINE HYDROCHLORIDE AND ALUMINUM HYDROXIDE AND MAGNESIUM HYDRO
10 KIT EVERY 4 HOURS
Refills: 0 | Status: DISCONTINUED | OUTPATIENT
Start: 2023-09-01 | End: 2023-09-07

## 2023-09-01 RX ORDER — CEFTRIAXONE 500 MG/1
2000 INJECTION, POWDER, FOR SOLUTION INTRAMUSCULAR; INTRAVENOUS EVERY 24 HOURS
Refills: 0 | Status: COMPLETED | OUTPATIENT
Start: 2023-09-01 | End: 2023-09-05

## 2023-09-01 RX ADMIN — DIPHENHYDRAMINE HYDROCHLORIDE AND LIDOCAINE HYDROCHLORIDE AND ALUMINUM HYDROXIDE AND MAGNESIUM HYDRO 10 MILLILITER(S): KIT at 11:04

## 2023-09-01 RX ADMIN — GABAPENTIN 300 MILLIGRAM(S): 400 CAPSULE ORAL at 06:26

## 2023-09-01 RX ADMIN — Medication 650 MILLIGRAM(S): at 08:41

## 2023-09-01 RX ADMIN — Medication 650 MILLIGRAM(S): at 09:41

## 2023-09-01 RX ADMIN — CEFTRIAXONE 100 MILLIGRAM(S): 500 INJECTION, POWDER, FOR SOLUTION INTRAMUSCULAR; INTRAVENOUS at 16:40

## 2023-09-01 RX ADMIN — DIPHENHYDRAMINE HYDROCHLORIDE AND LIDOCAINE HYDROCHLORIDE AND ALUMINUM HYDROXIDE AND MAGNESIUM HYDRO 10 MILLILITER(S): KIT at 22:11

## 2023-09-01 RX ADMIN — DIPHENHYDRAMINE HYDROCHLORIDE AND LIDOCAINE HYDROCHLORIDE AND ALUMINUM HYDROXIDE AND MAGNESIUM HYDRO 10 MILLILITER(S): KIT at 19:05

## 2023-09-01 RX ADMIN — PANTOPRAZOLE SODIUM 40 MILLIGRAM(S): 20 TABLET, DELAYED RELEASE ORAL at 06:27

## 2023-09-01 RX ADMIN — GABAPENTIN 300 MILLIGRAM(S): 400 CAPSULE ORAL at 22:10

## 2023-09-01 RX ADMIN — Medication 60 MILLIGRAM(S): at 18:14

## 2023-09-01 RX ADMIN — Medication 40 MILLIGRAM(S): at 06:27

## 2023-09-01 RX ADMIN — ENOXAPARIN SODIUM 40 MILLIGRAM(S): 100 INJECTION SUBCUTANEOUS at 06:26

## 2023-09-01 RX ADMIN — GABAPENTIN 300 MILLIGRAM(S): 400 CAPSULE ORAL at 13:11

## 2023-09-01 RX ADMIN — Medication 1 TABLET(S): at 13:11

## 2023-09-01 RX ADMIN — ENOXAPARIN SODIUM 40 MILLIGRAM(S): 100 INJECTION SUBCUTANEOUS at 18:14

## 2023-09-01 NOTE — PROGRESS NOTE ADULT - PROBLEM SELECTOR PLAN 2
ARIANA (1:1280 speckled), anti-smith (positive), complement levels (C3 normal, C4 low), anti RNP (elevated; >8)  SARS COV-2 spike protein antibody positive; CRP and ESR elevated    Hypersensitivity pneumonitis panel negative  Uploaded prior CT chest from 6/2023 that looks essentially normal.  TTE wnl    -Continue prednisone 40mg qd and bactrim PJP ppx (eg, double strength 1 tablet daily)  -PT evaluation and document ambulatory saturation if possible; will likely require establishment of home oxygen prior to discharge  -Nutrition counseling and weight loss  -Will require dedicated outpatient pulmonary follow up with Dr. Whitney in 2 weeks with formal PFTs and a 6MWT. -Repeat CT chest in 6 weeks.  -Will need to walk with suppl O2 prior to discharge; will call 48 hours after discharge to see if the patient is okay.

## 2023-09-01 NOTE — PROGRESS NOTE ADULT - ATTENDING COMMENTS
Patient was seen and examined at bedside on 9/1/2023 at 130 pm. Patient reports that she feels mostly unchanged. Denies SOB, CP, dizziness, abdominal pain, N/V. ROS is otherwise negative. Vitals, labwork and pertinent imaging reviewed. Physical exam - NAD, AAO x 4, PERRLA, EOMI, supple neck, chest - CTA b/l, CV - rrr, s1s2, no m/r/g, abd - soft, + BS, ext - WWP, psych - normal affect, skin - no rash, back - midline    Plan  -Will start high dose Steroids - Solumedrol 60 mg IV q6 for at least 3 days, Protonix 40 mg PO qd  -Will start CTX given + cultures (H influenza)   -Continue to monitor oxygen requirements

## 2023-09-01 NOTE — PROGRESS NOTE ADULT - PROBLEM SELECTOR PLAN 7
#BL hand and arm numbness 2/2 to radiculopathy  Patient states that numbness in the BL UE has been increasing due to sleeping on hospital mattress.  Home meds prednisone 20mg BID, gabapentin 100g TID.    - f/u neuro recs  - c/w prednisone 40 mg qD  - c/w gabapentin 300 mg TID  - PT consulted #BL hand and arm numbness 2/2 to radiculopathy  Patient states that numbness in the BL UE has been increasing due to sleeping on hospital mattress.  Home meds prednisone 20mg BID, gabapentin 100g TID.    - f/u neuro recs  - c/w gabapentin 300 mg TID  - PT consulted

## 2023-09-01 NOTE — PROGRESS NOTE ADULT - SUBJECTIVE AND OBJECTIVE BOX
O/N Events: no events overnight    Subjective/ROS: Patient seen and examined at bedside. Patient states that the numbness in her hands is persistent despite increase in gabapentin from 100 mg TID to 300 mg TID. Patient stated that has some traumatic ulcers from the bronchoscopy and a sinus headache. Denies any diarrhea, cramping.     Denies Fever/Chills, HA, CP, n/v, changes in bowel/urinary habits.  12pt ROS otherwise negative.    VITALS  Vital Signs Last 24 Hrs  T(C): 36.5 (01 Sep 2023 13:00), Max: 36.7 (31 Aug 2023 20:42)  T(F): 97.7 (01 Sep 2023 13:00), Max: 98.1 (01 Sep 2023 06:25)  HR: 72 (01 Sep 2023 13:00) (72 - 85)  BP: 119/72 (01 Sep 2023 13:00) (119/72 - 130/80)  BP(mean): --  RR: 18 (01 Sep 2023 13:00) (16 - 18)  SpO2: 96% (01 Sep 2023 13:00) (86% - 97%)    Parameters below as of 01 Sep 2023 13:00  Patient On (Oxygen Delivery Method): nasal cannula  O2 Flow (L/min): 4      CAPILLARY BLOOD GLUCOSE      POCT Blood Glucose.: 145 mg/dL (01 Sep 2023 11:52)  POCT Blood Glucose.: 108 mg/dL (01 Sep 2023 08:55)  POCT Blood Glucose.: 144 mg/dL (31 Aug 2023 21:59)  POCT Blood Glucose.: 148 mg/dL (31 Aug 2023 17:53)      PHYSICAL EXAM  General: NAD  Head: NC/AT; MMM; EOMI;  Neck: Supple; no JVD  Respiratory: crackles heard in the lower lung fields bilaterally  Cardiovascular: Regular rhythm/rate; S1/S2+, no murmurs  Gastrointestinal: Soft; obese  Extremities: WWP; lymphedema in BL lower extremities  Neurological: A&Ox3, no obvious focal deficits      MEDICATIONS  (STANDING):  cefTRIAXone   IVPB 2000 milliGRAM(s) IV Intermittent every 24 hours  dextrose 5%. 1000 milliLiter(s) (50 mL/Hr) IV Continuous <Continuous>  dextrose 5%. 1000 milliLiter(s) (100 mL/Hr) IV Continuous <Continuous>  dextrose 50% Injectable 25 Gram(s) IV Push once  dextrose 50% Injectable 12.5 Gram(s) IV Push once  dextrose 50% Injectable 25 Gram(s) IV Push once  enoxaparin Injectable 40 milliGRAM(s) SubCutaneous every 12 hours  FIRST- Mouthwash  BLM 10 milliLiter(s) Swish and Spit every 4 hours  gabapentin 300 milliGRAM(s) Oral every 8 hours  glucagon  Injectable 1 milliGRAM(s) IntraMuscular once  insulin lispro (ADMELOG) corrective regimen sliding scale   SubCutaneous Before meals and at bedtime  methylPREDNISolone sodium succinate Injectable 60 milliGRAM(s) IV Push every 6 hours  pantoprazole    Tablet 40 milliGRAM(s) Oral before breakfast  trimethoprim  160 mG/sulfamethoxazole 800 mG 1 Tablet(s) Oral daily    MEDICATIONS  (PRN):  acetaminophen     Tablet .. 650 milliGRAM(s) Oral every 6 hours PRN Temp greater or equal to 38C (100.4F), Mild Pain (1 - 3)  benzocaine/menthol Lozenge 1 Lozenge Oral every 4 hours PRN Sore Throat  dextrose Oral Gel 15 Gram(s) Oral once PRN Blood Glucose LESS THAN 70 milliGRAM(s)/deciliter      No Known Allergies      LABS                        14.2   11.23 )-----------( 171      ( 01 Sep 2023 07:00 )             44.7     09-01    135  |  102  |  20  ----------------------------<  94  4.7   |  25  |  0.93    Ca    9.6      01 Sep 2023 07:00  Phos  3.7     09-01  Mg     2.0     09-01    TPro  7.0  /  Alb  3.2<L>  /  TBili  0.4  /  DBili  x   /  AST  53<H>  /  ALT  59<H>  /  AlkPhos  77  09-01      Urinalysis Basic - ( 01 Sep 2023 07:00 )    Color: x / Appearance: x / SG: x / pH: x  Gluc: 94 mg/dL / Ketone: x  / Bili: x / Urobili: x   Blood: x / Protein: x / Nitrite: x   Leuk Esterase: x / RBC: x / WBC x   Sq Epi: x / Non Sq Epi: x / Bacteria: x              IMAGING/EKG/ETC

## 2023-09-01 NOTE — PROGRESS NOTE ADULT - ATTENDING COMMENTS
Clinically stable, new dx of CTD-ILD and now on prednisone and supplemental O2. Discharge planning ongoing, and will follow up w Dr Whitney. Dr Del Castillo covers the pulmonary service for the long weekend.

## 2023-09-01 NOTE — PROGRESS NOTE ADULT - ASSESSMENT
62 year old female with a past medical history of afib, and sciatica, who presented to [facility] for shortness of breath found to have tropinemia and interstitial lung disease admitted for hypoxia.

## 2023-09-01 NOTE — PROGRESS NOTE ADULT - ASSESSMENT
62 year old female with a past medical history of afib, and sciatica, who presented to [facility] for shortness of breath. Pulmonary consulted for acute hypoxic respiratory failure.     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

## 2023-09-01 NOTE — PROGRESS NOTE ADULT - SUBJECTIVE AND OBJECTIVE BOX
PULMONARY CONSULT SERVICE FOLLOW-UP NOTE    INTERVAL HPI:  Reviewed chart and overnight events; patient seen and examined at bedside. Patient says she feels about the same as she did yesterday. She is hoping to go home today or this weekend.     MEDICATIONS:  Pulmonary:    Antimicrobials:  cefTRIAXone   IVPB 2000 milliGRAM(s) IV Intermittent every 24 hours  trimethoprim  160 mG/sulfamethoxazole 800 mG 1 Tablet(s) Oral daily    Anticoagulants:  enoxaparin Injectable 40 milliGRAM(s) SubCutaneous every 12 hours    Cardiac:      Allergies    No Known Allergies    Intolerances        Vital Signs Last 24 Hrs  T(C): 36.7 (01 Sep 2023 06:25), Max: 36.7 (31 Aug 2023 20:42)  T(F): 98.1 (01 Sep 2023 06:25), Max: 98.1 (01 Sep 2023 06:25)  HR: 85 (01 Sep 2023 06:25) (74 - 85)  BP: 128/70 (01 Sep 2023 06:25) (123/74 - 130/80)  BP(mean): --  RR: 18 (01 Sep 2023 06:25) (16 - 18)  SpO2: 92% (01 Sep 2023 06:25) (92% - 97%)    Parameters below as of 01 Sep 2023 06:25  Patient On (Oxygen Delivery Method): nasal cannula  O2 Flow (L/min): 4 08-31 @ 07:01  -  09-01 @ 07:00  --------------------------------------------------------  IN: 360 mL / OUT: 0 mL / NET: 360 mL          PHYSICAL EXAM:  Constitutional: WD  HEENT: NC/AT; PERRL, anicteric sclera; MMM  Neck: supple  Lymph: No supraclavical LAD or cervical chain LAD  Cardiovascular: +S1/S2, RRR  Respiratory: CTA B/L; no W/R/R  Gastrointestinal: soft, NT/ND  Extremities: WWP; no edema, clubbing or cyanosis  Vascular: 2+ radial and pedal pulses  Neurological: AAOx3; no focal deficits    LABS:      CBC Full  -  ( 01 Sep 2023 07:00 )  WBC Count : 11.23 K/uL  RBC Count : 4.59 M/uL  Hemoglobin : 14.2 g/dL  Hematocrit : 44.7 %  Platelet Count - Automated : 171 K/uL  Mean Cell Volume : 97.4 fl  Mean Cell Hemoglobin : 30.9 pg  Mean Cell Hemoglobin Concentration : 31.8 gm/dL  Auto Neutrophil # : 9.06 K/uL  Auto Lymphocyte # : 1.03 K/uL  Auto Monocyte # : 0.48 K/uL  Auto Eosinophil # : 0.57 K/uL  Auto Basophil # : 0.02 K/uL  Auto Neutrophil % : 80.6 %  Auto Lymphocyte % : 9.2 %  Auto Monocyte % : 4.3 %  Auto Eosinophil % : 5.1 %  Auto Basophil % : 0.2 %    09-01    135  |  102  |  20  ----------------------------<  94  4.7   |  25  |  0.93    Ca    9.6      01 Sep 2023 07:00  Phos  3.7     09-01  Mg     2.0     09-01    TPro  7.0  /  Alb  3.2<L>  /  TBili  0.4  /  DBili  x   /  AST  53<H>  /  ALT  59<H>  /  AlkPhos  77  09-01      Urinalysis Basic - ( 01 Sep 2023 07:00 )    Color: x / Appearance: x / SG: x / pH: x  Gluc: 94 mg/dL / Ketone: x  / Bili: x / Urobili: x   Blood: x / Protein: x / Nitrite: x   Leuk Esterase: x / RBC: x / WBC x   Sq Epi: x / Non Sq Epi: x / Bacteria: x      RADIOLOGY & ADDITIONAL STUDIES:    < from: Xray Chest 1 View-PORTABLE IMMEDIATE (Xray Chest 1 View-PORTABLE IMMEDIATE .) (08.30.23 @ 13:31) >    ACC: [id number] EXAM:  XR CHEST PORTABLE IMMED 1V   ORDERED BY: FRED CHAVEZ     PROCEDURE DATE:  08/30/2023          INTERPRETATION:  PORTABLE CHEST X-RAY    HISTORY: Cough    PRIOR STUDIES: CT chest August 28, 2023.    IMPRESSION: No significantchange bilateral lung markings compared to    radiograph August 28, 2023. No pneumothorax or pleural effusion.   Stable cardiac mediastinal silhouette.    --- End of Report ---            MORALES SCHRADER MD; Attending Radiologist  This documenthas been electronically signed. Aug 30 2023  2:11PM    < end of copied text >

## 2023-09-01 NOTE — PROGRESS NOTE ADULT - PROBLEM SELECTOR PLAN 2
CXR revealed ground glass opacity. CTA revealed interstitial lung disease.  Procal 0.09  Anti- SSA 0.4, SSB <0.2, rheumatoid factor quant <10, CCP (-), centromere ab <0.2, antiribonucleoprotein >8.0  c-ANCA (-), p-ANCA (-), atypical ANCA indeterminate, ARIANA speckled, Anti-nuclear factor titer 1:1280, DS DNA <12,   Alejandro >8, RNA polymerase III IG 16,   C4 12, C3 99,   Hypersensitivity pneumonitis panel wnl    High resolution CT:  1. Since August 26, 2023, again interstitial lung disease non-UIP pattern. Although no subpleural sparing, possibly interstitial pneumonia, differential includes NSIP associated with connective tissue disorders, chronic HP or drug toxicity.    - f/u rheum consult  - f/u rheumatologic workup: myomarker panel, and systemic sclerosis panel  - continue to monitor sxs CXR revealed ground glass opacity. CTA revealed interstitial lung disease.  Procal 0.09  Anti- SSA 0.4, SSB <0.2, rheumatoid factor quant <10, CCP (-), centromere ab <0.2, antiribonucleoprotein >8.0  c-ANCA (-), p-ANCA (-), atypical ANCA indeterminate, ARIANA speckled, Anti-nuclear factor titer 1:1280, DS DNA <12,   Alejandro >8, RNA polymerase III IG 16,   C4 12, C3 99,   Hypersensitivity pneumonitis panel wnl    High resolution CT:  1. Since August 26, 2023, again interstitial lung disease non-UIP pattern. Although no subpleural sparing, possibly interstitial pneumonia, differential includes NSIP associated with connective tissue disorders, chronic HP or drug toxicity.    - f/u rheum consult  - f/u rheumatologic workup: myomarker panel and systemic sclerosis panel  - continue to monitor sxs

## 2023-09-01 NOTE — PROGRESS NOTE ADULT - PROBLEM SELECTOR PLAN 1
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 RVP, cov negative. Hb and Hct elevated in setting of  chronic sob  CXR: pulmonary infiltrates  CTA: Bilateral interstitial lung disease. Differential diagnosis includes HP, NSIP, atypical pneumonia.    Possibly idiopathic pulmonary fibrosis with chronic duration given CTA findings and CXR. 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 Symptoms have not improved with prednisone 20mg BID, may be less likely ILD secondary to rheumatological causes. Less likely interstitial pneumonia given asymptomatic and wbc wnl. Physical exam not notable for connective tissue diseases.   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 RVP, cov negative. Hb and Hct elevated in setting of  chronic sob  CXR: pulmonary infiltrates  CTA: Bilateral interstitial lung disease. Differential diagnosis includes HP, NSIP, atypical pneumonia.    Possibly idiopathic pulmonary fibrosis with chronic duration given CTA findings and CXR. 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 Symptoms have not improved with prednisone 20mg BID, may be less likely ILD secondary to rheumatological causes. Less likely interstitial pneumonia given asymptomatic and wbc wnl. Physical exam not notable for connective tissue diseases.   Procal 0.09, legionella (-), strep pneumo (-)  8/28: patient O2 requirement 3L NC -> 4L NC  High resolution CT findings in problem 4 ground glass below  s/p bronchoscopy with BAL and biopsy  Bronch culture: H. parainfluenza     - f/u pulm consult  - d/c prednisone 40mg PO qD  - start solumedrol 60mg IV q6h  - start protonix 40mg PO qd before breakfast   - start ceftriaxone 2000mg IV q24h  - bactrim 1 DS qd PJP prophylaxis   - incentive spirometer  - wean o2 as appropriate

## 2023-09-02 LAB
ALBUMIN SERPL ELPH-MCNC: 3.6 G/DL — SIGNIFICANT CHANGE UP (ref 3.3–5)
ALP SERPL-CCNC: 73 U/L — SIGNIFICANT CHANGE UP (ref 40–120)
ALT FLD-CCNC: 54 U/L — HIGH (ref 10–45)
ANION GAP SERPL CALC-SCNC: 9 MMOL/L — SIGNIFICANT CHANGE UP (ref 5–17)
ANISOCYTOSIS BLD QL: SLIGHT — SIGNIFICANT CHANGE UP
AST SERPL-CCNC: 34 U/L — SIGNIFICANT CHANGE UP (ref 10–40)
BASOPHILS # BLD AUTO: 0 K/UL — SIGNIFICANT CHANGE UP (ref 0–0.2)
BASOPHILS NFR BLD AUTO: 0 % — SIGNIFICANT CHANGE UP (ref 0–2)
BILIRUB SERPL-MCNC: 0.4 MG/DL — SIGNIFICANT CHANGE UP (ref 0.2–1.2)
BUN SERPL-MCNC: 18 MG/DL — SIGNIFICANT CHANGE UP (ref 7–23)
CALCIUM SERPL-MCNC: 10 MG/DL — SIGNIFICANT CHANGE UP (ref 8.4–10.5)
CHLORIDE SERPL-SCNC: 99 MMOL/L — SIGNIFICANT CHANGE UP (ref 96–108)
CO2 SERPL-SCNC: 27 MMOL/L — SIGNIFICANT CHANGE UP (ref 22–31)
CREAT SERPL-MCNC: 0.75 MG/DL — SIGNIFICANT CHANGE UP (ref 0.5–1.3)
EGFR: 89 ML/MIN/1.73M2 — SIGNIFICANT CHANGE UP
EOSINOPHIL # BLD AUTO: 0.16 K/UL — SIGNIFICANT CHANGE UP (ref 0–0.5)
EOSINOPHIL NFR BLD AUTO: 1.8 % — SIGNIFICANT CHANGE UP (ref 0–6)
GIANT PLATELETS BLD QL SMEAR: PRESENT — SIGNIFICANT CHANGE UP
GLUCOSE BLDC GLUCOMTR-MCNC: 154 MG/DL — HIGH (ref 70–99)
GLUCOSE BLDC GLUCOMTR-MCNC: 161 MG/DL — HIGH (ref 70–99)
GLUCOSE BLDC GLUCOMTR-MCNC: 175 MG/DL — HIGH (ref 70–99)
GLUCOSE BLDC GLUCOMTR-MCNC: 178 MG/DL — HIGH (ref 70–99)
GLUCOSE SERPL-MCNC: 157 MG/DL — HIGH (ref 70–99)
HCT VFR BLD CALC: 47.2 % — HIGH (ref 34.5–45)
HGB BLD-MCNC: 14.9 G/DL — SIGNIFICANT CHANGE UP (ref 11.5–15.5)
HYPOCHROMIA BLD QL: SLIGHT — SIGNIFICANT CHANGE UP
LYMPHOCYTES # BLD AUTO: 0.08 K/UL — LOW (ref 1–3.3)
LYMPHOCYTES # BLD AUTO: 0.9 % — LOW (ref 13–44)
MAGNESIUM SERPL-MCNC: 2.1 MG/DL — SIGNIFICANT CHANGE UP (ref 1.6–2.6)
MANUAL SMEAR VERIFICATION: SIGNIFICANT CHANGE UP
MCHC RBC-ENTMCNC: 31.2 PG — SIGNIFICANT CHANGE UP (ref 27–34)
MCHC RBC-ENTMCNC: 31.6 GM/DL — LOW (ref 32–36)
MCV RBC AUTO: 98.7 FL — SIGNIFICANT CHANGE UP (ref 80–100)
MONOCYTES # BLD AUTO: 0.24 K/UL — SIGNIFICANT CHANGE UP (ref 0–0.9)
MONOCYTES NFR BLD AUTO: 2.7 % — SIGNIFICANT CHANGE UP (ref 2–14)
NEUTROPHILS # BLD AUTO: 8.4 K/UL — HIGH (ref 1.8–7.4)
NEUTROPHILS NFR BLD AUTO: 91.9 % — HIGH (ref 43–77)
NEUTS BAND # BLD: 0.9 % — SIGNIFICANT CHANGE UP (ref 0–8)
OVALOCYTES BLD QL SMEAR: SLIGHT — SIGNIFICANT CHANGE UP
PHOSPHATE SERPL-MCNC: 4.1 MG/DL — SIGNIFICANT CHANGE UP (ref 2.5–4.5)
PLAT MORPH BLD: NORMAL — SIGNIFICANT CHANGE UP
PLATELET # BLD AUTO: 166 K/UL — SIGNIFICANT CHANGE UP (ref 150–400)
POLYCHROMASIA BLD QL SMEAR: SLIGHT — SIGNIFICANT CHANGE UP
POTASSIUM SERPL-MCNC: 4.9 MMOL/L — SIGNIFICANT CHANGE UP (ref 3.5–5.3)
POTASSIUM SERPL-SCNC: 4.9 MMOL/L — SIGNIFICANT CHANGE UP (ref 3.5–5.3)
PROT SERPL-MCNC: 7.7 G/DL — SIGNIFICANT CHANGE UP (ref 6–8.3)
RBC # BLD: 4.78 M/UL — SIGNIFICANT CHANGE UP (ref 3.8–5.2)
RBC # FLD: 14.2 % — SIGNIFICANT CHANGE UP (ref 10.3–14.5)
RBC BLD AUTO: ABNORMAL
SMUDGE CELLS # BLD: PRESENT — SIGNIFICANT CHANGE UP
SODIUM SERPL-SCNC: 135 MMOL/L — SIGNIFICANT CHANGE UP (ref 135–145)
VARIANT LYMPHS # BLD: 1.8 % — SIGNIFICANT CHANGE UP (ref 0–6)
WBC # BLD: 9.05 K/UL — SIGNIFICANT CHANGE UP (ref 3.8–10.5)
WBC # FLD AUTO: 9.05 K/UL — SIGNIFICANT CHANGE UP (ref 3.8–10.5)

## 2023-09-02 PROCEDURE — 99233 SBSQ HOSP IP/OBS HIGH 50: CPT | Mod: GC

## 2023-09-02 PROCEDURE — 99232 SBSQ HOSP IP/OBS MODERATE 35: CPT | Mod: GC

## 2023-09-02 RX ORDER — METFORMIN HYDROCHLORIDE 850 MG/1
500 TABLET ORAL
Refills: 0 | Status: DISCONTINUED | OUTPATIENT
Start: 2023-09-02 | End: 2023-09-13

## 2023-09-02 RX ADMIN — ENOXAPARIN SODIUM 40 MILLIGRAM(S): 100 INJECTION SUBCUTANEOUS at 06:11

## 2023-09-02 RX ADMIN — Medication 1 TABLET(S): at 11:57

## 2023-09-02 RX ADMIN — Medication 60 MILLIGRAM(S): at 06:11

## 2023-09-02 RX ADMIN — GABAPENTIN 300 MILLIGRAM(S): 400 CAPSULE ORAL at 06:11

## 2023-09-02 RX ADMIN — Medication 60 MILLIGRAM(S): at 18:16

## 2023-09-02 RX ADMIN — GABAPENTIN 300 MILLIGRAM(S): 400 CAPSULE ORAL at 23:02

## 2023-09-02 RX ADMIN — PANTOPRAZOLE SODIUM 40 MILLIGRAM(S): 20 TABLET, DELAYED RELEASE ORAL at 06:12

## 2023-09-02 RX ADMIN — CEFTRIAXONE 100 MILLIGRAM(S): 500 INJECTION, POWDER, FOR SOLUTION INTRAMUSCULAR; INTRAVENOUS at 15:33

## 2023-09-02 RX ADMIN — Medication 60 MILLIGRAM(S): at 11:57

## 2023-09-02 RX ADMIN — GABAPENTIN 300 MILLIGRAM(S): 400 CAPSULE ORAL at 13:43

## 2023-09-02 RX ADMIN — ENOXAPARIN SODIUM 40 MILLIGRAM(S): 100 INJECTION SUBCUTANEOUS at 18:15

## 2023-09-02 RX ADMIN — Medication 60 MILLIGRAM(S): at 00:01

## 2023-09-02 RX ADMIN — METFORMIN HYDROCHLORIDE 500 MILLIGRAM(S): 850 TABLET ORAL at 18:56

## 2023-09-02 NOTE — PROGRESS NOTE ADULT - ATTENDING COMMENTS
Patient was seen and examined at bedside on 9/2/2023 at 1230 pm. Patient reports that she feels mostly unchanged. Denies SOB, CP, dizziness, abdominal pain, N/V. ROS is otherwise negative. Vitals, labwork and pertinent imaging reviewed. Physical exam - NAD, AAO x 4, PERRLA, EOMI, supple neck, chest - CTA b/l, CV - rrr, s1s2, no m/r/g, abd - soft, + BS, ext - WWP, psych - normal affect, skin - no rash, back - midline    Plan  -Will start high dose Steroids - Solumedrol 60 mg IV q6 for at least 3 days, Protonix 40 mg PO qd  -Will start CTX given + cultures (H influenza), 5 day course  -Continue to monitor oxygen requirements  -Start Metformin given increased FS

## 2023-09-02 NOTE — PROGRESS NOTE ADULT - ATTENDING COMMENTS
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 Covering for CAP as well given high risk. On PCP ppx i/s/o high dose steroids and will need prolonged course. Rest of plan as above

## 2023-09-02 NOTE — PROGRESS NOTE ADULT - PROBLEM SELECTOR PLAN 2
CXR revealed ground glass opacity. CTA revealed interstitial lung disease.  Procal 0.09  Anti- SSA 0.4, SSB <0.2, rheumatoid factor quant <10, CCP (-), centromere ab <0.2, antiribonucleoprotein >8.0  c-ANCA (-), p-ANCA (-), atypical ANCA indeterminate, ARIANA speckled, Anti-nuclear factor titer 1:1280, DS DNA <12,   Alejandro >8, RNA polymerase III IG 16,   C4 12, C3 99,   Hypersensitivity pneumonitis panel wnl    High resolution CT:  1. Since August 26, 2023, again interstitial lung disease non-UIP pattern. Although no subpleural sparing, possibly interstitial pneumonia, differential includes NSIP associated with connective tissue disorders, chronic HP or drug toxicity.    - f/u rheum consult  - f/u rheumatologic workup: myomarker panel and systemic sclerosis panel  - continue to monitor sxs

## 2023-09-02 NOTE — PROGRESS NOTE ADULT - SUBJECTIVE AND OBJECTIVE BOX
O/N Events: patient refused insulin overnight, was given patient education in regards to steroids and hyperglycemia    Subjective/ROS: Patient seen and examined at bedside. Patient is resting comfortably. Patient stated that when she took a shower (~5 min duration), she noticed that her feet "turned purple" and her oxygen was 69% on her personal pulse ox.   Complains that the gabapentin wears off before the next dose is given and asked if the frequency could be increased to 4 times a day. She keeps track using a notepad shown at bedside.  This AM patient stated that she prefers metformin for management of hyperglycemia and wants to avoid insulin because she wants to avoid additional needles.    Denies Fever/Chills, HA, CP, n/v, changes in bowel/urinary habits.  12pt ROS otherwise negative.    VITALS  Vital Signs Last 24 Hrs  T(C): 36.6 (02 Sep 2023 06:15), Max: 36.9 (01 Sep 2023 20:43)  T(F): 97.8 (02 Sep 2023 06:15), Max: 98.5 (01 Sep 2023 20:43)  HR: 85 (02 Sep 2023 06:15) (72 - 85)  BP: 139/81 (02 Sep 2023 06:15) (107/69 - 139/81)  BP(mean): --  RR: 18 (02 Sep 2023 06:15) (18 - 18)  SpO2: 93% (02 Sep 2023 06:15) (93% - 96%)    Parameters below as of 02 Sep 2023 06:15  Patient On (Oxygen Delivery Method): nasal cannula  O2 Flow (L/min): 4      CAPILLARY BLOOD GLUCOSE      POCT Blood Glucose.: 161 mg/dL (02 Sep 2023 12:03)  POCT Blood Glucose.: 178 mg/dL (02 Sep 2023 09:14)  POCT Blood Glucose.: 156 mg/dL (01 Sep 2023 22:33)  POCT Blood Glucose.: 105 mg/dL (01 Sep 2023 18:03)      PHYSICAL EXAM  General: NAD  Head: NC/AT; MMM; EOMI;  Neck: Supple; no JVD  Respiratory: soft crackles in lower BL lung fields.  Cardiovascular: Regular rhythm/rate; S1/S2+, no murmur  Gastrointestinal: Soft; obese  Extremities: WWP; BL lymphedema present  Neurological: A&Ox3, no obvious focal deficits    MEDICATIONS  (STANDING):  cefTRIAXone   IVPB 2000 milliGRAM(s) IV Intermittent every 24 hours  dextrose 5%. 1000 milliLiter(s) (50 mL/Hr) IV Continuous <Continuous>  dextrose 5%. 1000 milliLiter(s) (100 mL/Hr) IV Continuous <Continuous>  dextrose 50% Injectable 25 Gram(s) IV Push once  dextrose 50% Injectable 25 Gram(s) IV Push once  dextrose 50% Injectable 12.5 Gram(s) IV Push once  enoxaparin Injectable 40 milliGRAM(s) SubCutaneous every 12 hours  FIRST- Mouthwash  BLM 10 milliLiter(s) Swish and Spit every 4 hours  gabapentin 300 milliGRAM(s) Oral every 8 hours  glucagon  Injectable 1 milliGRAM(s) IntraMuscular once  insulin lispro (ADMELOG) corrective regimen sliding scale   SubCutaneous Before meals and at bedtime  metFORMIN 500 milliGRAM(s) Oral two times a day  methylPREDNISolone sodium succinate Injectable 60 milliGRAM(s) IV Push every 6 hours  pantoprazole    Tablet 40 milliGRAM(s) Oral before breakfast  trimethoprim  160 mG/sulfamethoxazole 800 mG 1 Tablet(s) Oral daily    MEDICATIONS  (PRN):  acetaminophen     Tablet .. 650 milliGRAM(s) Oral every 6 hours PRN Temp greater or equal to 38C (100.4F), Mild Pain (1 - 3)  benzocaine/menthol Lozenge 1 Lozenge Oral every 4 hours PRN Sore Throat  dextrose Oral Gel 15 Gram(s) Oral once PRN Blood Glucose LESS THAN 70 milliGRAM(s)/deciliter      No Known Allergies      LABS                        14.9   9.05  )-----------( 166      ( 02 Sep 2023 07:04 )             47.2     09-02    135  |  99  |  18  ----------------------------<  157<H>  4.9   |  27  |  0.75    Ca    10.0      02 Sep 2023 07:04  Phos  4.1     09-02  Mg     2.1     09-02    TPro  7.7  /  Alb  3.6  /  TBili  0.4  /  DBili  x   /  AST  34  /  ALT  54<H>  /  AlkPhos  73  09-02      Urinalysis Basic - ( 02 Sep 2023 07:04 )    Color: x / Appearance: x / SG: x / pH: x  Gluc: 157 mg/dL / Ketone: x  / Bili: x / Urobili: x   Blood: x / Protein: x / Nitrite: x   Leuk Esterase: x / RBC: x / WBC x   Sq Epi: x / Non Sq Epi: x / Bacteria: x              IMAGING/EKG/ETC

## 2023-09-02 NOTE — PROGRESS NOTE ADULT - PROBLEM SELECTOR PLAN 7
#BL hand and arm numbness 2/2 to radiculopathy  Patient states that numbness in the BL UE has been increasing due to sleeping on hospital mattress.  Home meds prednisone 20mg BID, gabapentin 100g TID.    - f/u neuro recs  - c/w gabapentin 300 mg TID  - PT consulted

## 2023-09-02 NOTE — PROGRESS NOTE ADULT - ASSESSMENT
62 year old female with a past medical history of afib, and sciatica, who presented to [facility] for shortness of breath. Pulmonary consulted for acute hypoxic respiratory failure.     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

## 2023-09-02 NOTE — PROGRESS NOTE ADULT - PROBLEM SELECTOR PLAN 2
ARIANA (1:1280 speckled), anti-smith (positive), complement levels (C3 normal, C4 low), anti RNP (elevated; >8)  SARS COV-2 spike protein antibody positive; CRP and ESR elevated    Hypersensitivity pneumonitis panel negative  Uploaded prior CT chest from 6/2023 that looks essentially normal.  TTE wnl    -Continue IV solumedrol 60mg q6h and bactrim PJP ppx (eg, double strength 1 tablet daily), PPI  -IV Ceftriaxone out of an abundance of precaution to cover BAL specimen  -PT evaluation and document ambulatory saturation if possible; will likely require establishment of home oxygen prior to discharge  -Nutrition counseling and weight loss  -Will require dedicated outpatient pulmonary follow up with Dr. Whitney in 2 weeks with formal PFTs and a 6MWT. -Repeat CT chest in 6 weeks.  -Will need to walk with suppl O2 prior to discharge; will call 48 hours after discharge to see if the patient is okay.

## 2023-09-02 NOTE — PROGRESS NOTE ADULT - SUBJECTIVE AND OBJECTIVE BOX
PULMONARY CONSULT SERVICE FOLLOW-UP NOTE    INTERVAL HPI:  Reviewed chart and overnight events; patient seen and examined at bedside. States that she was cyanotic when taking a shower and checked her pulse ox to be 69% when she came out, but returned to normal with O2 supplementation    MEDICATIONS:  Pulmonary:    Antimicrobials:  cefTRIAXone   IVPB 2000 milliGRAM(s) IV Intermittent every 24 hours  trimethoprim  160 mG/sulfamethoxazole 800 mG 1 Tablet(s) Oral daily    Anticoagulants:  enoxaparin Injectable 40 milliGRAM(s) SubCutaneous every 12 hours    Cardiac:      Allergies    No Known Allergies    Intolerances        Vital Signs Last 24 Hrs  T(C): 36.6 (02 Sep 2023 06:15), Max: 36.9 (01 Sep 2023 20:43)  T(F): 97.8 (02 Sep 2023 06:15), Max: 98.5 (01 Sep 2023 20:43)  HR: 85 (02 Sep 2023 06:15) (72 - 85)  BP: 139/81 (02 Sep 2023 06:15) (107/69 - 139/81)  BP(mean): --  RR: 18 (02 Sep 2023 06:15) (18 - 18)  SpO2: 93% (02 Sep 2023 06:15) (86% - 96%)    Parameters below as of 02 Sep 2023 06:15  Patient On (Oxygen Delivery Method): nasal cannula  O2 Flow (L/min): 4          PHYSICAL EXAM:  Constitutional: WDWN  HEENT: NC/AT; PERRL, anicteric sclera; MMM  Neck: supple  Cardiovascular: +S1/S2, RRR  Respiratory: Breathing comfortably on 4LPM NC; CTA B/L; no W/R/R  Gastrointestinal: soft, NT/ND; +BSx4  Extremities: WWP; no edema, clubbing or cyanosis  Vascular: 2+ radial, DP/PT pulses B/L  Neurological: AAOx3; no focal deficits    LABS:      CBC Full  -  ( 02 Sep 2023 07:04 )  WBC Count : 9.05 K/uL  RBC Count : 4.78 M/uL  Hemoglobin : 14.9 g/dL  Hematocrit : 47.2 %  Platelet Count - Automated : 166 K/uL  Mean Cell Volume : 98.7 fl  Mean Cell Hemoglobin : 31.2 pg  Mean Cell Hemoglobin Concentration : 31.6 gm/dL  Auto Neutrophil # : 8.40 K/uL  Auto Lymphocyte # : 0.08 K/uL  Auto Monocyte # : 0.24 K/uL  Auto Eosinophil # : 0.16 K/uL  Auto Basophil # : 0.00 K/uL  Auto Neutrophil % : 91.9 %  Auto Lymphocyte % : 0.9 %  Auto Monocyte % : 2.7 %  Auto Eosinophil % : 1.8 %  Auto Basophil % : 0.0 %    09-02    135  |  99  |  18  ----------------------------<  157<H>  4.9   |  27  |  0.75    Ca    10.0      02 Sep 2023 07:04  Phos  4.1     09-02  Mg     2.1     09-02    TPro  7.7  /  Alb  3.6  /  TBili  0.4  /  DBili  x   /  AST  34  /  ALT  54<H>  /  AlkPhos  73  09-02          Urinalysis Basic - ( 02 Sep 2023 07:04 )    Color: x / Appearance: x / SG: x / pH: x  Gluc: 157 mg/dL / Ketone: x  / Bili: x / Urobili: x   Blood: x / Protein: x / Nitrite: x   Leuk Esterase: x / RBC: x / WBC x   Sq Epi: x / Non Sq Epi: x / Bacteria: x                RADIOLOGY & ADDITIONAL STUDIES:

## 2023-09-02 NOTE — PROGRESS NOTE ADULT - PROBLEM SELECTOR PLAN 1
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 RVP, cov negative. Hb and Hct elevated in setting of  chronic sob  CXR: pulmonary infiltrates  CTA: Bilateral interstitial lung disease. Differential diagnosis includes HP, NSIP, atypical pneumonia.    Possibly idiopathic pulmonary fibrosis with chronic duration given CTA findings and CXR. 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 Symptoms have not improved with prednisone 20mg BID, may be less likely ILD secondary to rheumatological causes. Less likely interstitial pneumonia given asymptomatic and wbc wnl. Physical exam not notable for connective tissue diseases.   Procal 0.09, legionella (-), strep pneumo (-)  8/28: patient O2 requirement 3L NC -> 4L NC  High resolution CT findings in problem 4 ground glass below  s/p bronchoscopy with BAL and biopsy  Bronch culture: H. parainfluenza     - f/u pulm consult  - d/c prednisone 40mg PO qD  - start solumedrol 60mg IV q6h  - start protonix 40mg PO qd before breakfast   - start ceftriaxone 2000mg IV q24h  - bactrim 1 DS qd PJP prophylaxis   - incentive spirometer  - wean o2 as appropriate

## 2023-09-02 NOTE — PROGRESS NOTE ADULT - ASSESSMENT
62 year old female with a past medical history of afib, and sciatica, who presented to [facility] for shortness of breath found to have tropinemia and interstitial lung disease admitted for hypoxia.

## 2023-09-03 LAB
ALBUMIN SERPL ELPH-MCNC: 3.3 G/DL — SIGNIFICANT CHANGE UP (ref 3.3–5)
ALP SERPL-CCNC: 66 U/L — SIGNIFICANT CHANGE UP (ref 40–120)
ALT FLD-CCNC: 51 U/L — HIGH (ref 10–45)
ANION GAP SERPL CALC-SCNC: 9 MMOL/L — SIGNIFICANT CHANGE UP (ref 5–17)
AST SERPL-CCNC: 31 U/L — SIGNIFICANT CHANGE UP (ref 10–40)
BASOPHILS # BLD AUTO: 0.02 K/UL — SIGNIFICANT CHANGE UP (ref 0–0.2)
BASOPHILS NFR BLD AUTO: 0.2 % — SIGNIFICANT CHANGE UP (ref 0–2)
BILIRUB SERPL-MCNC: 0.4 MG/DL — SIGNIFICANT CHANGE UP (ref 0.2–1.2)
BUN SERPL-MCNC: 21 MG/DL — SIGNIFICANT CHANGE UP (ref 7–23)
CALCIUM SERPL-MCNC: 9.9 MG/DL — SIGNIFICANT CHANGE UP (ref 8.4–10.5)
CHLORIDE SERPL-SCNC: 101 MMOL/L — SIGNIFICANT CHANGE UP (ref 96–108)
CO2 SERPL-SCNC: 26 MMOL/L — SIGNIFICANT CHANGE UP (ref 22–31)
CREAT SERPL-MCNC: 0.74 MG/DL — SIGNIFICANT CHANGE UP (ref 0.5–1.3)
EGFR: 90 ML/MIN/1.73M2 — SIGNIFICANT CHANGE UP
EOSINOPHIL # BLD AUTO: 0.29 K/UL — SIGNIFICANT CHANGE UP (ref 0–0.5)
EOSINOPHIL NFR BLD AUTO: 2.5 % — SIGNIFICANT CHANGE UP (ref 0–6)
GLUCOSE BLDC GLUCOMTR-MCNC: 133 MG/DL — HIGH (ref 70–99)
GLUCOSE BLDC GLUCOMTR-MCNC: 138 MG/DL — HIGH (ref 70–99)
GLUCOSE BLDC GLUCOMTR-MCNC: 139 MG/DL — HIGH (ref 70–99)
GLUCOSE BLDC GLUCOMTR-MCNC: 171 MG/DL — HIGH (ref 70–99)
GLUCOSE SERPL-MCNC: 163 MG/DL — HIGH (ref 70–99)
HCT VFR BLD CALC: 46.6 % — HIGH (ref 34.5–45)
HGB BLD-MCNC: 14.9 G/DL — SIGNIFICANT CHANGE UP (ref 11.5–15.5)
IMM GRANULOCYTES NFR BLD AUTO: 1 % — HIGH (ref 0–0.9)
LYMPHOCYTES # BLD AUTO: 0.42 K/UL — LOW (ref 1–3.3)
LYMPHOCYTES # BLD AUTO: 3.6 % — LOW (ref 13–44)
MAGNESIUM SERPL-MCNC: 2 MG/DL — SIGNIFICANT CHANGE UP (ref 1.6–2.6)
MCHC RBC-ENTMCNC: 31.3 PG — SIGNIFICANT CHANGE UP (ref 27–34)
MCHC RBC-ENTMCNC: 32 GM/DL — SIGNIFICANT CHANGE UP (ref 32–36)
MCV RBC AUTO: 97.9 FL — SIGNIFICANT CHANGE UP (ref 80–100)
MONOCYTES # BLD AUTO: 0.4 K/UL — SIGNIFICANT CHANGE UP (ref 0–0.9)
MONOCYTES NFR BLD AUTO: 3.4 % — SIGNIFICANT CHANGE UP (ref 2–14)
NEUTROPHILS # BLD AUTO: 10.45 K/UL — HIGH (ref 1.8–7.4)
NEUTROPHILS NFR BLD AUTO: 89.3 % — HIGH (ref 43–77)
NRBC # BLD: 0 /100 WBCS — SIGNIFICANT CHANGE UP (ref 0–0)
PHOSPHATE SERPL-MCNC: 4.1 MG/DL — SIGNIFICANT CHANGE UP (ref 2.5–4.5)
PLATELET # BLD AUTO: 157 K/UL — SIGNIFICANT CHANGE UP (ref 150–400)
POTASSIUM SERPL-MCNC: 4.7 MMOL/L — SIGNIFICANT CHANGE UP (ref 3.5–5.3)
POTASSIUM SERPL-SCNC: 4.7 MMOL/L — SIGNIFICANT CHANGE UP (ref 3.5–5.3)
PROT SERPL-MCNC: 6.9 G/DL — SIGNIFICANT CHANGE UP (ref 6–8.3)
RBC # BLD: 4.76 M/UL — SIGNIFICANT CHANGE UP (ref 3.8–5.2)
RBC # FLD: 13.8 % — SIGNIFICANT CHANGE UP (ref 10.3–14.5)
SODIUM SERPL-SCNC: 136 MMOL/L — SIGNIFICANT CHANGE UP (ref 135–145)
WBC # BLD: 11.7 K/UL — HIGH (ref 3.8–10.5)
WBC # FLD AUTO: 11.7 K/UL — HIGH (ref 3.8–10.5)

## 2023-09-03 PROCEDURE — 99232 SBSQ HOSP IP/OBS MODERATE 35: CPT | Mod: GC

## 2023-09-03 RX ADMIN — METFORMIN HYDROCHLORIDE 500 MILLIGRAM(S): 850 TABLET ORAL at 06:41

## 2023-09-03 RX ADMIN — Medication 1 TABLET(S): at 12:19

## 2023-09-03 RX ADMIN — Medication 60 MILLIGRAM(S): at 12:20

## 2023-09-03 RX ADMIN — GABAPENTIN 300 MILLIGRAM(S): 400 CAPSULE ORAL at 14:45

## 2023-09-03 RX ADMIN — CEFTRIAXONE 100 MILLIGRAM(S): 500 INJECTION, POWDER, FOR SOLUTION INTRAMUSCULAR; INTRAVENOUS at 12:19

## 2023-09-03 RX ADMIN — ENOXAPARIN SODIUM 40 MILLIGRAM(S): 100 INJECTION SUBCUTANEOUS at 06:41

## 2023-09-03 RX ADMIN — Medication 60 MILLIGRAM(S): at 06:40

## 2023-09-03 RX ADMIN — GABAPENTIN 300 MILLIGRAM(S): 400 CAPSULE ORAL at 22:28

## 2023-09-03 RX ADMIN — METFORMIN HYDROCHLORIDE 500 MILLIGRAM(S): 850 TABLET ORAL at 17:40

## 2023-09-03 RX ADMIN — PANTOPRAZOLE SODIUM 40 MILLIGRAM(S): 20 TABLET, DELAYED RELEASE ORAL at 06:41

## 2023-09-03 RX ADMIN — GABAPENTIN 300 MILLIGRAM(S): 400 CAPSULE ORAL at 06:41

## 2023-09-03 RX ADMIN — ENOXAPARIN SODIUM 40 MILLIGRAM(S): 100 INJECTION SUBCUTANEOUS at 17:40

## 2023-09-03 RX ADMIN — Medication 60 MILLIGRAM(S): at 00:23

## 2023-09-03 RX ADMIN — Medication 60 MILLIGRAM(S): at 17:40

## 2023-09-03 RX ADMIN — Medication 60 MILLIGRAM(S): at 23:40

## 2023-09-03 NOTE — DISCHARGE NOTE PROVIDER - DETAILS OF MALNUTRITION DIAGNOSIS/DIAGNOSES
This patient has been assessed with a concern for Malnutrition and was treated during this hospitalization for the following Nutrition diagnosis/diagnoses:     -  08/30/2023: Morbid obesity (BMI > 40)

## 2023-09-03 NOTE — DISCHARGE NOTE PROVIDER - HOSPITAL COURSE
#Discharge: do not delete    The patient is a 62 year old female with a past medical history of afib, and sciatica, who presented to [facility] for shortness of breath. Pt stated that she went to the urgent care prior to admission where she had an XR concerning for b/l lower infiltrates. She stated that she could only walk 12-15 feet before feeling short of breath. She stated that she has a pulse ox at home showing readings of 82%. She stated that she had chronic SOB for several months and got worked up in [place] where she had a CT scan that was negative for PE. She also stated that she was seen by pulmonology and rheumatology, where they ruled out lupus and other immunological disorders. Pt states that she has a cough with no sputum. Pt denies any uri sxs, chest pain, pain with breathing, abdominal pain, nvd, headaches. While at [facility] patient was underwent bronchoscopy with BAL and biopsy and pulse steroids.    # Acute respiratory failure with hypoxia.   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 Pt home med is prednisone 20mg BID for cervical pinched nerve. RVP, cov negative. Hb and Hct elevated in setting of  chronic sob  CXR: pulmonary infiltrates  CTA: Bilateral interstitial lung disease. Differential diagnosis includes HP, NSIP, atypical pneumonia.    Possibly idiopathic pulmonary fibrosis with chronic duration given CTA findings and CXR. 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 Symptoms have not improved with prednisone 20mg BID, may be less likely ILD secondary to rheumatological causes. Less likely interstitial pneumonia given asymptomatic and wbc wnl. Physical exam not notable for connective tissue diseases.   Procal 0.09, legionella (-), strep pneumo (-)  8/28: patient O2 requirement 3L NC -> 4L NC  High resolution CT findings in problem 4 ground glass below  s/p bronchoscopy with BAL and biopsy  Bronch culture: H. parainfluenza     - f/u pulm consult  - d/c prednisone 40mg PO qD  - start solumedrol 60mg IV q6h  - start protonix 40mg PO qd before breakfast   - start ceftriaxone 2000mg IV q24h  - bactrim 1 DS qd PJP prophylaxis   - incentive spirometer  - wean o2 as appropriate.    # NSIP (nonspecific interstitial pneumonia).   CXR revealed ground glass opacity. CTA revealed interstitial lung disease.  Procal 0.09  Anti- SSA 0.4, SSB <0.2, rheumatoid factor quant <10, CCP (-), centromere ab <0.2, antiribonucleoprotein >8.0  c-ANCA (-), p-ANCA (-), atypical ANCA indeterminate, ARIANA speckled, Anti-nuclear factor titer 1:1280, DS DNA <12,   Alejandro >8, RNA polymerase III IG 16,   C4 12, C3 99,   Hypersensitivity pneumonitis panel wnl    High resolution CT:  1. Since August 26, 2023, again interstitial lung disease non-UIP pattern. Although no subpleural sparing, possibly interstitial pneumonia, differential includes NSIP associated with connective tissue disorders, chronic HP or drug toxicity.    - f/u rheum consult  - f/u rheumatologic workup: myomarker panel and systemic sclerosis panel  - continue to monitor sxs.    # Demand ischemia.   RESOLVED   No acs sxs. Elevated troponin, downtrending. In the setting of chronic sob and dyspnea   on exertion. BNP wnl  A1c 6.1  Cholesterol 152, triglycerides 104, HDL 32, LDL 99  TTE:  1. Normal left and right ventricular size and systolic function.  2. Normal atria.  3. No significant valvular disease.  4. No evidence of pulmonary hypertension.  5. No pericardial effusion.  6. No prior echo is available for comparison.    #Elevated troponin.   RESOLVED  Trops downtrending. Troponin may be elevated in the setting of demand ischemia. Pt has history of chronic SOB. BNP wnl    # Transaminitis.   AST 58, ALT 71. May be elevated in the setting of decreased perfusion. Cr wnl  Hep panel non-reactive     # Afib.   Pt states she had afib 1 month ago. Has not been taking medications.    Patient was discharged to: home    New medications:   Changes to old medications:  Medications that were stopped:    Items to follow up as outpatient:    Physical exam at the time of discharge:   #Discharge: do not delete    The patient is a 62 year old female with a past medical history of afib, and sciatica, who presented to [facility] for shortness of breath. Pt stated that she went to the urgent care prior to admission where she had an XR concerning for b/l lower infiltrates. She stated that she could only walk 12-15 feet before feeling short of breath. She stated that she has a pulse ox at home showing readings of 82%. She stated that she had chronic SOB for several months and got worked up in [place] where she had a CT scan that was negative for PE. She also stated that she was seen by pulmonology and rheumatology, where they ruled out lupus and other immunological disorders. Pt states that she has a cough with no sputum. Pt denies any uri sxs, chest pain, pain with breathing, abdominal pain, nvd, headaches. While at [facility] patient was underwent bronchoscopy with BAL and biopsy and pulse steroids.    # Acute respiratory failure with hypoxia.   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 Pt home med is prednisone 20mg BID for cervical pinched nerve. RVP, cov negative. Hb and Hct elevated in setting of  chronic sob  CXR: pulmonary infiltrates  CTA: Bilateral interstitial lung disease. Differential diagnosis includes HP, NSIP, atypical pneumonia.    Possibly idiopathic pulmonary fibrosis with chronic duration given CTA findings and CXR. 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 Symptoms have not improved with prednisone 20mg BID, may be less likely ILD secondary to rheumatological causes. Less likely interstitial pneumonia given asymptomatic and wbc wnl. Physical exam not notable for connective tissue diseases.   Procal 0.09, legionella (-), strep pneumo (-)  8/28: patient O2 requirement 3L NC -> 4L NC  High resolution CT findings in problem 4 ground glass below  s/p bronchoscopy with BAL and biopsy  Bronch culture: H. parainfluenza     - f/u pulm consult  - d/c prednisone 40mg PO qD  - start solumedrol 60mg IV q6h  - start protonix 40mg PO qd before breakfast   - start ceftriaxone 2000mg IV q24h  - bactrim 1 DS qd PJP prophylaxis   - incentive spirometer  - wean o2 as appropriate.    # NSIP (nonspecific interstitial pneumonia).   CXR revealed ground glass opacity. CTA revealed interstitial lung disease.  Procal 0.09  Anti- SSA 0.4, SSB <0.2, rheumatoid factor quant <10, CCP (-), centromere ab <0.2, antiribonucleoprotein >8.0  c-ANCA (-), p-ANCA (-), atypical ANCA indeterminate, ARIANA speckled, Anti-nuclear factor titer 1:1280, DS DNA <12,   Alejandro >8, RNA polymerase III IG 16,   C4 12, C3 99,   Hypersensitivity pneumonitis panel wnl    High resolution CT:  1. Since August 26, 2023, again interstitial lung disease non-UIP pattern. Although no subpleural sparing, possibly interstitial pneumonia, differential includes NSIP associated with connective tissue disorders, chronic HP or drug toxicity.    - f/u rheum consult  - f/u rheumatologic workup: myomarker panel and systemic sclerosis panel  - continue to monitor sxs.    # Demand ischemia.   RESOLVED   No acs sxs. Elevated troponin, downtrending. In the setting of chronic sob and dyspnea   on exertion. BNP wnl  A1c 6.1  Cholesterol 152, triglycerides 104, HDL 32, LDL 99  TTE:  1. Normal left and right ventricular size and systolic function.  2. Normal atria.  3. No significant valvular disease.  4. No evidence of pulmonary hypertension.  5. No pericardial effusion.  6. No prior echo is available for comparison.    #Elevated troponin- RESOLVED    # Transaminitis - RESOLVED  AST 58, ALT 71. May be elevated in the setting of decreased perfusion. Cr wnl  Hep panel non-reactive     # Afib.   Pt states she had afib 1 month ago. Has not been taking medications.    Patient was discharged to: home    New medications:   solumedrol PO     Changes to old medications:    Medications that were stopped:    Items to follow up as outpatient:  PFT     Physical exam at the time of discharge:   #Discharge: do not delete    The patient is a 62 year old female with a past medical history of afib, and sciatica, who presented to [facility] for shortness of breath. Pt stated that she went to the urgent care prior to admission where she had an XR concerning for b/l lower infiltrates. She stated that she could only walk 12-15 feet before feeling short of breath. She stated that she has a pulse ox at home showing readings of 82%. She stated that she had chronic SOB for several months and got worked up in [place] where she had a CT scan that was negative for PE. She also stated that she was seen by pulmonology and rheumatology, where they ruled out lupus and other immunological disorders. Pt states that she has a cough with no sputum. Pt denies any uri sxs, chest pain, pain with breathing, abdominal pain, nvd, headaches. While at [facility] patient was underwent bronchoscopy with BAL and biopsy and pulse steroids.    #NSIP (nonspecific interstitial pneumonia).   #hypoxia  STABLE. 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 Hb and Hct elevated in setting of chronic sob. CXR revealed ground glass opacity. CTA revealed interstitial lung disease. Infectious workup negative so far. Hypersensitivity pneumonitis panel wnl. on 2L O2. Ambulatory sats have been stable around high 80s/ low 90s with quick recovery to baseline. s/p solu-medrol 60mg course & tapering 40mg q6 9/6, 30mg q6 9/7, 20mg 9/8, transition to PO methylprednisone 32mg qd after. Also started on Cellcept 500 BID with no significant adverse effects.     #Likely LEILA  Indeterminate ANCA, ARIANA (1:1280 speckled), anti-smith (positive), anti-dsDNA (neg), complement levels (C3 normal, C4 low), RF (neg), sjogrens (neg), centromere abs (neg), RF (neg), anti-CCP (neg), anti RNP (elevated; >8), myomarker panel (pending), and systemic sclerosis (neg)    # Afib.   Pt states she had afib 1 month ago. Has not been taking medications. Lovenox inpt.     Patient was discharged to: home    New medications:   methylprednisone PO 32 qd  cellcept 500 PO BID  O2 therapy    Changes to old medications:    Medications that were stopped:    Items to follow up as outpatient:  -Will require dedicated outpatient pulmonary follow up with Dr. Whitney in 2 weeks from discharge with formal PFTs and a 6MWT.  -Repeat CT chest in 6 week  - rheum workup, if indicated.     Physical exam at the time of discharge:  ao3, rrr, mild crackles b/l LLs , abdomen ntnd, BL LE lymphedema, O2 sat during ambulation 91% on 2L O2/ resting 95% on 2L O2

## 2023-09-03 NOTE — DISCHARGE NOTE PROVIDER - CARE PROVIDER_API CALL
Kyle Pierce  Internal Medicine  [address] [place] [address], [address]  [place] [address]  Phone: [phone]  Fax: [phone]  Follow Up Time: 2 weeks    Trae Whitney  Pulmonary Disease  [address] [address]  Phone: [phone]  Fax: [phone]  Follow Up Time: 2 weeks   Kyle Pierce  Internal Medicine  [address] [place] [address], Floor [address]  [place] [address]  Phone: [phone]  Fax: [phone]  Follow Up Time: 2 weeks    Trae Whitney  Pulmonary Disease  [address], [address] [place] [address]  Phone: [phone]  Fax: [phone]  Follow Up Time: 2 weeks    Ryanne Allen  Rheumatology  [address] [address]  Phone: [phone]  Fax: [phone]  Follow Up Time: 1 week   Trae Whitney  Pulmonary Disease  [address], [address] [place] [address]  Phone: [phone]  Fax: [phone]  Follow Up Time: 2 weeks    Ryanne Allen  Rheumatology  [address] [address]  Phone: [phone]  Fax: [phone]  Follow Up Time: 1 week    Chacho Dumont  Internal Medicine  [address] [place] [address]  Phone: [phone]  Fax: [phone]  Scheduled Appointment: 09/13/2023 02:40 PM   Trae Whitney  Pulmonary Disease  [address], [address] [place] [address]  Phone: [phone]  Fax: [phone]  Follow Up Time: 2 weeks    Ryanne Allen  Rheumatology  [address] [address]  Phone: [phone]  Fax: [phone]  Follow Up Time: 1 week    Kyle Pierce  Internal Medicine  [address] [place] [address], [address]  [place] [address]  Phone: [phone]  Fax: [phone]  Follow Up Time: 2 weeks

## 2023-09-03 NOTE — PROGRESS NOTE ADULT - ASSESSMENT
62 year old female with a past medical history of afib, and sciatica, who presented to [facility] for shortness of breath. Pulmonary consulted for acute hypoxic respiratory failure.     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

## 2023-09-03 NOTE — PROGRESS NOTE ADULT - SUBJECTIVE AND OBJECTIVE BOX
PULMONARY CONSULT SERVICE FOLLOW-UP NOTE    INTERVAL HPI:  Reviewed chart and overnight events; patient seen and examined at bedside. Patient denies any cough, fever, chills, or shortness of breath. She was able to walk around the hallways and her room.    MEDICATIONS:  Pulmonary:    Antimicrobials:  cefTRIAXone   IVPB 2000 milliGRAM(s) IV Intermittent every 24 hours  trimethoprim  160 mG/sulfamethoxazole 800 mG 1 Tablet(s) Oral daily    Anticoagulants:  enoxaparin Injectable 40 milliGRAM(s) SubCutaneous every 12 hours    Cardiac:      Allergies    No Known Allergies    Intolerances        Vital Signs Last 24 Hrs  T(C): 36.5 (03 Sep 2023 12:17), Max: 37 (02 Sep 2023 20:45)  T(F): 97.7 (03 Sep 2023 12:17), Max: 98.6 (02 Sep 2023 20:45)  HR: 69 (03 Sep 2023 12:17) (69 - 95)  BP: 137/86 (03 Sep 2023 12:17) (131/79 - 137/86)  BP(mean): 96 (03 Sep 2023 06:57) (96 - 99)  RR: 18 (03 Sep 2023 12:17) (18 - 18)  SpO2: 94% (03 Sep 2023 12:17) (93% - 95%)    Parameters below as of 03 Sep 2023 12:17  Patient On (Oxygen Delivery Method): nasal cannula  O2 Flow (L/min): 3          PHYSICAL EXAM:  GENERAL: Pleasant, alert and oriented, obese, middle aged female not in any acute distress, appears comfortable. Sitting up in bed  HEENT: Nonicteric sclerae, PERRLA, EOMI. Oropharynx clear. Moist mucous membranes. No cervical, supraclavicular, or axillary LAD.  CHEST: Chest wall is nontender.  HEART: Regular rate and rhythm without any appreciable m/r/g.  LUNGS: Breathing comfortably on low flow nasal cannula. Difficult to appreciate breath sounds due to body habitus  ABDOMEN: Soft, normoactive bowel sounds, nontender in all quadrants.  : No suprapubic tenderness to palpation.  SKIN: No rash, no excessive bruising, petechiae, or purpura.  NEUROLOGIC: Moves all extremities spontaneously.  EXTREMITIES: 2+ bilateral radial pulses; WWP; No BLE pitting edema, clubbing or cyanosis; BLE varicose veins    LABS:      CBC Full  -  ( 03 Sep 2023 06:08 )  WBC Count : 11.70 K/uL  RBC Count : 4.76 M/uL  Hemoglobin : 14.9 g/dL  Hematocrit : 46.6 %  Platelet Count - Automated : 157 K/uL  Mean Cell Volume : 97.9 fl  Mean Cell Hemoglobin : 31.3 pg  Mean Cell Hemoglobin Concentration : 32.0 gm/dL  Auto Neutrophil # : 10.45 K/uL  Auto Lymphocyte # : 0.42 K/uL  Auto Monocyte # : 0.40 K/uL  Auto Eosinophil # : 0.29 K/uL  Auto Basophil # : 0.02 K/uL  Auto Neutrophil % : 89.3 %  Auto Lymphocyte % : 3.6 %  Auto Monocyte % : 3.4 %  Auto Eosinophil % : 2.5 %  Auto Basophil % : 0.2 %    09-03    136  |  101  |  21  ----------------------------<  163<H>  4.7   |  26  |  0.74    Ca    9.9      03 Sep 2023 06:08  Phos  4.1     09-03  Mg     2.0     09-03    TPro  6.9  /  Alb  3.3  /  TBili  0.4  /  DBili  x   /  AST  31  /  ALT  51<H>  /  AlkPhos  66  09-03          Urinalysis Basic - ( 03 Sep 2023 06:08 )    Color: x / Appearance: x / SG: x / pH: x  Gluc: 163 mg/dL / Ketone: x  / Bili: x / Urobili: x   Blood: x / Protein: x / Nitrite: x   Leuk Esterase: x / RBC: x / WBC x   Sq Epi: x / Non Sq Epi: x / Bacteria: x                RADIOLOGY & ADDITIONAL STUDIES:

## 2023-09-03 NOTE — PROGRESS NOTE ADULT - ATTENDING COMMENTS
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 Ambulating but having worsening hypoxia w/ ambulation to be expected. Will need to transition to oral prednisone if continues to improve, likely tomorrow or early next week. Covering for CAP as well given high risk. On PCP ppx i/s/o high dose steroids and will need prolonged course. Rest of plan as above

## 2023-09-03 NOTE — DISCHARGE NOTE PROVIDER - CARE PROVIDERS DIRECT ADDRESSES
,[email or web address] ,[email or web address] ,[email or web address],[email or web address] ,[email or web address].Bradley HospitalCradle TechnologiesUNM Children's Psychiatric Center.[facility],[email or web address].Bradley HospitalCradle TechnologiesUNM Children's Psychiatric Center.net

## 2023-09-03 NOTE — DISCHARGE NOTE PROVIDER - PROVIDER TOKENS
PROVIDER:[TOKEN:[[id number]],FOLLOWUP:[2 weeks]],PROVIDER:[TOKEN:[[id number]],FOLLOWUP:[2 weeks]] PROVIDER:[TOKEN:[[id number]],FOLLOWUP:[2 weeks]],PROVIDER:[TOKEN:[[id number]],FOLLOWUP:[2 weeks]],PROVIDER:[TOKEN:[[id number]],FOLLOWUP:[1 week]] PROVIDER:[TOKEN:[[id number]],FOLLOWUP:[2 weeks]],PROVIDER:[TOKEN:[[id number]],FOLLOWUP:[1 week]],FREE:[LAST:[Julia],FIRST:[Chacho Matamoros],PHONE:[phone]],FAX:[phone]],ADDRESS:[Internal Medicine  [address] [place] [address]],SCHEDULEDAPPT:[09/13/2023],SCHEDULEDAPPTTIME:[02:40 PM]] PROVIDER:[TOKEN:[[id number]],FOLLOWUP:[2 weeks]],PROVIDER:[TOKEN:[[id number]],FOLLOWUP:[1 week]],PROVIDER:[TOKEN:[[id number]],FOLLOWUP:[2 weeks]]

## 2023-09-03 NOTE — DISCHARGE NOTE PROVIDER - NSDCCPCAREPLAN_GEN_ALL_CORE_FT
PRINCIPAL DISCHARGE DIAGNOSIS  Diagnosis: NSIP (nonspecific interstitial pneumonia)  Assessment and Plan of Treatment:       SECONDARY DISCHARGE DIAGNOSES  Diagnosis: Diabetes mellitus  Assessment and Plan of Treatment:      PRINCIPAL DISCHARGE DIAGNOSIS  Diagnosis: NSIP (nonspecific interstitial pneumonia)  Assessment and Plan of Treatment:      PRINCIPAL DISCHARGE DIAGNOSIS  Diagnosis: NSIP (nonspecific interstitial pneumonia)  Assessment and Plan of Treatment: Interstitial lung disease describes a large group of disorders, most of which cause progressive scarring of lung tissue. The scarring associated with interstitial lung disease eventually affects your ability to breathe and get enough oxygen into your bloodstream.  Interstitial lung disease can be caused by long-term exposure to hazardous materials, such as asbestos. Some types of autoimmune diseases, such as rheumatoid arthritis, also can cause interstitial lung disease. In some cases, however, the causes remain unknown.  Once lung scarring occurs, it's generally irreversible. Medications may slow the damage of interstitial lung disease. It is very important for you to continue taking the medications we prescribed (Cellcept and methylprednisone) and have frequent follow up with your pulmonologist. Please also adhere to any additional oxygen therapy your pulmonologist suggests   We also recommend you follow up with your primary care doctor, pulmonologist and outpt rheumatologist within 2 weeks of your discharge for further management recommendations.   If you experience acute worsening of your symtoms, please go to your closest emergency room to seek medical attention.

## 2023-09-03 NOTE — DISCHARGE NOTE PROVIDER - NSDCFUADDAPPT_GEN_ALL_CORE_FT
(1) Please note that the office of your Pulmonary Medicine Provider, Dr. Trae Whitney will contact you from phone number [phone] to schedule an appointment following your hospital discharge.  Please bring your Insurance card, Photo ID, copy of Images if any; and Discharge paperwork to the appointment.    Appointment was facilitated by Ms. CELINA Hui, Referral Coordinator. (1) Please note that the office of your Pulmonary Medicine Provider, Dr. Trae Whitney will contact you from phone number [phone] to schedule an appointment following your hospital discharge.  Please bring your Insurance card, Photo ID, copy of Images if any; and Discharge paperwork to the appointment.    Appointment was facilitated by Ms. CELINA Hui, Referral Coordinator.    Please bring your Insurance card, Photo ID and Discharge paperwork to the following appointment:    (2) Please follow up with your Primary Care Provider, Dr. Chacho Dumont at [address] [place] [address] on 09/13/2023 at 2:40pm.    Appointment was scheduled by Ms. CELINA Hui, Referral Coordinator.

## 2023-09-03 NOTE — PROGRESS NOTE ADULT - PROBLEM SELECTOR PLAN 1
Improving with IV steroids and is currently on 2-3LPM NC.    Presenting in acute respiratory failure, with imaging should bilateral reticulations and ground glass opacities. Given reticular pattern, in particular in a subpleural pattern with a cranial caudal distrubution, GGOs, and arcade-like sign- this constellation of CT chest findings noted on HRCT favors mainly diagnoses of organizing pneumonia and NSIP 2/2 CTD (highly elevated anti-RNP). Patient may have certainly presented with ILD prior to rheumatological manifestations of MCTD.

## 2023-09-03 NOTE — PROGRESS NOTE ADULT - SUBJECTIVE AND OBJECTIVE BOX
O/N Events: DUNG    Subjective/ROS: Patient seen and examined at bedside. Patient is resting comfortably. Patient denies abdominal pain, CP.     Denies Fever/Chills, HA, CP, n/v, changes in bowel/urinary habits.  12pt ROS otherwise negative.      Vital Signs Last 24 Hrs  T(C): 36.7 (03 Sep 2023 06:57), Max: 37 (02 Sep 2023 20:45)  T(F): 98 (03 Sep 2023 06:57), Max: 98.6 (02 Sep 2023 20:45)  HR: 70 (03 Sep 2023 06:57) (70 - 95)  BP: 131/79 (03 Sep 2023 06:57) (131/79 - 132/83)  BP(mean): 96 (03 Sep 2023 06:57) (96 - 99)  RR: 18 (03 Sep 2023 06:57) (18 - 18)  SpO2: 94% (03 Sep 2023 06:57) (93% - 95%)    Parameters below as of 03 Sep 2023 06:57  Patient On (Oxygen Delivery Method): nasal cannula  O2 Flow (L/min): 2            PHYSICAL EXAM  General: NAD  Head: NC/AT; MMM; EOMI;  Neck: Supple; no JVD  Respiratory: soft crackles in lower BL lung fields.  Cardiovascular: Regular rhythm/rate; S1/S2+, no murmur  Gastrointestinal: Soft; obese  Extremities: WWP; BL lymphedema present  Neurological: A&Ox3, no obvious focal deficits  Psych: normal affect        MEDICATIONS  (STANDING):  cefTRIAXone   IVPB 2000 milliGRAM(s) IV Intermittent every 24 hours  dextrose 5%. 1000 milliLiter(s) (50 mL/Hr) IV Continuous <Continuous>  dextrose 5%. 1000 milliLiter(s) (100 mL/Hr) IV Continuous <Continuous>  dextrose 50% Injectable 25 Gram(s) IV Push once  dextrose 50% Injectable 25 Gram(s) IV Push once  dextrose 50% Injectable 12.5 Gram(s) IV Push once  enoxaparin Injectable 40 milliGRAM(s) SubCutaneous every 12 hours  FIRST- Mouthwash  BLM 10 milliLiter(s) Swish and Spit every 4 hours  gabapentin 300 milliGRAM(s) Oral every 8 hours  glucagon  Injectable 1 milliGRAM(s) IntraMuscular once  insulin lispro (ADMELOG) corrective regimen sliding scale   SubCutaneous Before meals and at bedtime  metFORMIN 500 milliGRAM(s) Oral two times a day  methylPREDNISolone sodium succinate Injectable 60 milliGRAM(s) IV Push every 6 hours  pantoprazole    Tablet 40 milliGRAM(s) Oral before breakfast  trimethoprim  160 mG/sulfamethoxazole 800 mG 1 Tablet(s) Oral daily    MEDICATIONS  (PRN):  acetaminophen     Tablet .. 650 milliGRAM(s) Oral every 6 hours PRN Temp greater or equal to 38C (100.4F), Mild Pain (1 - 3)  benzocaine/menthol Lozenge 1 Lozenge Oral every 4 hours PRN Sore Throat  dextrose Oral Gel 15 Gram(s) Oral once PRN Blood Glucose LESS THAN 70 milliGRAM(s)/deciliter          No Known Allergies      LABS                            14.9   11.70 )-----------( 157      ( 03 Sep 2023 06:08 )             46.6   09-03    136  |  101  |  21  ----------------------------<  163<H>  4.7   |  26  |  0.74    Ca    9.9      03 Sep 2023 06:08  Phos  4.1     09-03  Mg     2.0     09-03    TPro  6.9  /  Alb  3.3  /  TBili  0.4  /  DBili  x   /  AST  31  /  ALT  51<H>  /  AlkPhos  66  09-03        Urinalysis Basic - ( 02 Sep 2023 07:04 )    Color: x / Appearance: x / SG: x / pH: x  Gluc: 157 mg/dL / Ketone: x  / Bili: x / Urobili: x   Blood: x / Protein: x / Nitrite: x   Leuk Esterase: x / RBC: x / WBC x   Sq Epi: x / Non Sq Epi: x / Bacteria: x              IMAGING/EKG/ETC

## 2023-09-03 NOTE — DISCHARGE NOTE PROVIDER - NSDCMRMEDTOKEN_GEN_ALL_CORE_FT
gabapentin 100 mg oral capsule: 1 orally 3 times a day  predniSONE 20 mg oral tablet: 1 orally 2 times a day   Dexcom G7 Monitor: Use as prescribed  Dexcom G7 [place]: use as instructed  Dexcom G7 Sensors: change every 10 days  Dexcom G7 Sensors: 3 applicators, change every 10 days  gabapentin 100 mg oral capsule: 1 orally 3 times a day  predniSONE 20 mg oral tablet: 1 orally 2 times a day

## 2023-09-03 NOTE — DISCHARGE NOTE PROVIDER - NPI NUMBER (FOR SYSADMIN USE ONLY) :
[[id number] [[id number] [[id number]],[[id number]] [[id number]]

## 2023-09-03 NOTE — PROGRESS NOTE ADULT - ASSESSMENT
62 year old female with a past medical history of afib, and sciatica, who presented to [facility] for shortness of breath found to have tropinemia and interstitial lung disease admitted for hypoxia.

## 2023-09-04 LAB
ALBUMIN SERPL ELPH-MCNC: 3.3 G/DL — SIGNIFICANT CHANGE UP (ref 3.3–5)
ALP SERPL-CCNC: 64 U/L — SIGNIFICANT CHANGE UP (ref 40–120)
ALT FLD-CCNC: 53 U/L — HIGH (ref 10–45)
ANION GAP SERPL CALC-SCNC: 7 MMOL/L — SIGNIFICANT CHANGE UP (ref 5–17)
AST SERPL-CCNC: 28 U/L — SIGNIFICANT CHANGE UP (ref 10–40)
BASOPHILS # BLD AUTO: 0.02 K/UL — SIGNIFICANT CHANGE UP (ref 0–0.2)
BASOPHILS NFR BLD AUTO: 0.1 % — SIGNIFICANT CHANGE UP (ref 0–2)
BILIRUB SERPL-MCNC: 0.4 MG/DL — SIGNIFICANT CHANGE UP (ref 0.2–1.2)
BUN SERPL-MCNC: 25 MG/DL — HIGH (ref 7–23)
CALCIUM SERPL-MCNC: 9.7 MG/DL — SIGNIFICANT CHANGE UP (ref 8.4–10.5)
CHLORIDE SERPL-SCNC: 100 MMOL/L — SIGNIFICANT CHANGE UP (ref 96–108)
CO2 SERPL-SCNC: 25 MMOL/L — SIGNIFICANT CHANGE UP (ref 22–31)
CREAT SERPL-MCNC: 0.74 MG/DL — SIGNIFICANT CHANGE UP (ref 0.5–1.3)
EGFR: 90 ML/MIN/1.73M2 — SIGNIFICANT CHANGE UP
EOSINOPHIL # BLD AUTO: 0.11 K/UL — SIGNIFICANT CHANGE UP (ref 0–0.5)
EOSINOPHIL NFR BLD AUTO: 0.8 % — SIGNIFICANT CHANGE UP (ref 0–6)
GLUCOSE BLDC GLUCOMTR-MCNC: 142 MG/DL — HIGH (ref 70–99)
GLUCOSE BLDC GLUCOMTR-MCNC: 147 MG/DL — HIGH (ref 70–99)
GLUCOSE BLDC GLUCOMTR-MCNC: 170 MG/DL — HIGH (ref 70–99)
GLUCOSE BLDC GLUCOMTR-MCNC: 181 MG/DL — HIGH (ref 70–99)
GLUCOSE SERPL-MCNC: 174 MG/DL — HIGH (ref 70–99)
HCT VFR BLD CALC: 46.2 % — HIGH (ref 34.5–45)
HGB BLD-MCNC: 15.4 G/DL — SIGNIFICANT CHANGE UP (ref 11.5–15.5)
IMM GRANULOCYTES NFR BLD AUTO: 1.5 % — HIGH (ref 0–0.9)
LYMPHOCYTES # BLD AUTO: 0.4 K/UL — LOW (ref 1–3.3)
LYMPHOCYTES # BLD AUTO: 2.9 % — LOW (ref 13–44)
MAGNESIUM SERPL-MCNC: 2 MG/DL — SIGNIFICANT CHANGE UP (ref 1.6–2.6)
MCHC RBC-ENTMCNC: 32.2 PG — SIGNIFICANT CHANGE UP (ref 27–34)
MCHC RBC-ENTMCNC: 33.3 GM/DL — SIGNIFICANT CHANGE UP (ref 32–36)
MCV RBC AUTO: 96.7 FL — SIGNIFICANT CHANGE UP (ref 80–100)
MONOCYTES # BLD AUTO: 0.52 K/UL — SIGNIFICANT CHANGE UP (ref 0–0.9)
MONOCYTES NFR BLD AUTO: 3.8 % — SIGNIFICANT CHANGE UP (ref 2–14)
NEUTROPHILS # BLD AUTO: 12.31 K/UL — HIGH (ref 1.8–7.4)
NEUTROPHILS NFR BLD AUTO: 90.9 % — HIGH (ref 43–77)
NRBC # BLD: 0 /100 WBCS — SIGNIFICANT CHANGE UP (ref 0–0)
PHOSPHATE SERPL-MCNC: 3.8 MG/DL — SIGNIFICANT CHANGE UP (ref 2.5–4.5)
PLATELET # BLD AUTO: 206 K/UL — SIGNIFICANT CHANGE UP (ref 150–400)
POTASSIUM SERPL-MCNC: 5.1 MMOL/L — SIGNIFICANT CHANGE UP (ref 3.5–5.3)
POTASSIUM SERPL-SCNC: 5.1 MMOL/L — SIGNIFICANT CHANGE UP (ref 3.5–5.3)
PROT SERPL-MCNC: 6.9 G/DL — SIGNIFICANT CHANGE UP (ref 6–8.3)
RBC # BLD: 4.78 M/UL — SIGNIFICANT CHANGE UP (ref 3.8–5.2)
RBC # FLD: 14 % — SIGNIFICANT CHANGE UP (ref 10.3–14.5)
SODIUM SERPL-SCNC: 132 MMOL/L — LOW (ref 135–145)
WBC # BLD: 13.56 K/UL — HIGH (ref 3.8–10.5)
WBC # FLD AUTO: 13.56 K/UL — HIGH (ref 3.8–10.5)

## 2023-09-04 PROCEDURE — 99233 SBSQ HOSP IP/OBS HIGH 50: CPT | Mod: GC

## 2023-09-04 RX ADMIN — ENOXAPARIN SODIUM 40 MILLIGRAM(S): 100 INJECTION SUBCUTANEOUS at 17:57

## 2023-09-04 RX ADMIN — GABAPENTIN 300 MILLIGRAM(S): 400 CAPSULE ORAL at 06:09

## 2023-09-04 RX ADMIN — GABAPENTIN 300 MILLIGRAM(S): 400 CAPSULE ORAL at 13:22

## 2023-09-04 RX ADMIN — CEFTRIAXONE 100 MILLIGRAM(S): 500 INJECTION, POWDER, FOR SOLUTION INTRAMUSCULAR; INTRAVENOUS at 12:37

## 2023-09-04 RX ADMIN — ENOXAPARIN SODIUM 40 MILLIGRAM(S): 100 INJECTION SUBCUTANEOUS at 06:09

## 2023-09-04 RX ADMIN — GABAPENTIN 300 MILLIGRAM(S): 400 CAPSULE ORAL at 22:12

## 2023-09-04 RX ADMIN — Medication 60 MILLIGRAM(S): at 17:56

## 2023-09-04 RX ADMIN — Medication 60 MILLIGRAM(S): at 11:29

## 2023-09-04 RX ADMIN — Medication 60 MILLIGRAM(S): at 06:10

## 2023-09-04 RX ADMIN — METFORMIN HYDROCHLORIDE 500 MILLIGRAM(S): 850 TABLET ORAL at 06:10

## 2023-09-04 RX ADMIN — Medication 60 MILLIGRAM(S): at 23:37

## 2023-09-04 RX ADMIN — PANTOPRAZOLE SODIUM 40 MILLIGRAM(S): 20 TABLET, DELAYED RELEASE ORAL at 06:09

## 2023-09-04 RX ADMIN — Medication 1 TABLET(S): at 11:28

## 2023-09-04 RX ADMIN — METFORMIN HYDROCHLORIDE 500 MILLIGRAM(S): 850 TABLET ORAL at 17:56

## 2023-09-04 NOTE — PROGRESS NOTE ADULT - ATTENDING COMMENTS
Patient was seen and examined at bedside on 9/2/2023 at 1230 pm. Patient reports that she feels mostly unchanged. Denies SOB, CP, dizziness, abdominal pain, N/V. ROS is otherwise negative. Vitals, labwork and pertinent imaging reviewed. Physical exam - NAD, AAO x 4, PERRLA, EOMI, supple neck, chest - CTA b/l, CV - rrr, s1s2, no m/r/g, abd - soft, + BS, ext - WWP, psych - normal affect, skin - no rash, back - midline    Plan  -Will start high dose Steroids - Solumedrol 60 mg IV q6, Protonix 40 mg PO qd  -Will start CTX given + cultures (H influenza), 5 day course  -Continue to monitor oxygen requirements  -Start Metformin given increased FS

## 2023-09-04 NOTE — PROGRESS NOTE ADULT - PROBLEM SELECTOR PLAN 2
ARIANA (1:1280 speckled), anti-smith (positive), complement levels (C3 normal, C4 low), anti RNP (elevated; >8)  SARS COV-2 spike protein antibody positive; CRP and ESR elevated    Hypersensitivity pneumonitis panel negative  Uploaded prior CT chest from 6/2023 that looks essentially normal.  TTE wnl    -Continue IV solumedrol 60mg q6h for an additional 2 days (finish on 9/6 noon dose; then transition to oral prednisone) and bactrim PJP ppx (eg, double strength 1 tablet daily), PPI  -IV Ceftriaxone 5 days out of an abundance of precaution to cover BAL specimen  -PT evaluation and document ambulatory saturation if possible; will likely require establishment of home oxygen prior to discharge  -Nutrition counseling and weight loss  -Will require dedicated outpatient pulmonary follow up with Dr. Whitney in 2 weeks with formal PFTs and a 6MWT.  -Repeat CT chest in 6 weeks.  -Will need to walk with suppl O2 prior to discharge; will call 48 hours after discharge to see if the patient is okay.

## 2023-09-04 NOTE — PROGRESS NOTE ADULT - SUBJECTIVE AND OBJECTIVE BOX
PULMONARY CONSULT SERVICE FOLLOW-UP NOTE    INTERVAL HPI:  Reviewed chart and overnight events; patient seen and examined at bedside.    MEDICATIONS:  Pulmonary:    Antimicrobials:  cefTRIAXone   IVPB 2000 milliGRAM(s) IV Intermittent every 24 hours  trimethoprim  160 mG/sulfamethoxazole 800 mG 1 Tablet(s) Oral daily    Anticoagulants:  enoxaparin Injectable 40 milliGRAM(s) SubCutaneous every 12 hours    Cardiac:      Allergies    No Known Allergies    Intolerances        Vital Signs Last 24 Hrs  T(C): 36.8 (04 Sep 2023 12:29), Max: 36.8 (03 Sep 2023 20:31)  T(F): 98.2 (04 Sep 2023 12:29), Max: 98.3 (03 Sep 2023 20:31)  HR: 80 (04 Sep 2023 12:29) (71 - 80)  BP: 144/82 (04 Sep 2023 12:29) (139/80 - 153/88)  BP(mean): --  RR: 18 (04 Sep 2023 12:29) (17 - 18)  SpO2: 86% (04 Sep 2023 12:29) (86% - 93%)    Parameters below as of 04 Sep 2023 12:29  Patient On (Oxygen Delivery Method): nasal cannula  O2 Flow (L/min): 2          PHYSICAL EXAM:  GENERAL: Pleasant, alert and oriented, obese, middle aged female not in any acute distress, appears comfortable.  HEENT: Nonicteric sclerae, PERRLA, EOMI. Oropharynx clear. Moist mucous membranes.  CHEST: Chest wall is nontender.  HEART: Regular rate and rhythm without any appreciable m/r/g.  LUNGS: Breathing comfortably on 2L nasal cannula. Difficult to appreciate breath sounds due to body habitus.  ABDOMEN: Soft, normoactive bowel sounds, nontender in all quadrants.  : No suprapubic tenderness to palpation.  SKIN: No rash, no excessive bruising, petechiae, or purpura.  NEUROLOGIC: Moves all extremities spontaneously.  EXTREMITIES: 2+ bilateral radial pulses; WWP; No BLE pitting edema, clubbing or cyanosis    LABS:      CBC Full  -  ( 04 Sep 2023 06:21 )  WBC Count : 13.56 K/uL  RBC Count : 4.78 M/uL  Hemoglobin : 15.4 g/dL  Hematocrit : 46.2 %  Platelet Count - Automated : 206 K/uL  Mean Cell Volume : 96.7 fl  Mean Cell Hemoglobin : 32.2 pg  Mean Cell Hemoglobin Concentration : 33.3 gm/dL  Auto Neutrophil # : 12.31 K/uL  Auto Lymphocyte # : 0.40 K/uL  Auto Monocyte # : 0.52 K/uL  Auto Eosinophil # : 0.11 K/uL  Auto Basophil # : 0.02 K/uL  Auto Neutrophil % : 90.9 %  Auto Lymphocyte % : 2.9 %  Auto Monocyte % : 3.8 %  Auto Eosinophil % : 0.8 %  Auto Basophil % : 0.1 %    09-04    132<L>  |  100  |  25<H>  ----------------------------<  174<H>  5.1   |  25  |  0.74    Ca    9.7      04 Sep 2023 06:21  Phos  3.8     09-04  Mg     2.0     09-04    TPro  6.9  /  Alb  3.3  /  TBili  0.4  /  DBili  x   /  AST  28  /  ALT  53<H>  /  AlkPhos  64  09-04          Urinalysis Basic - ( 04 Sep 2023 06:21 )    Color: x / Appearance: x / SG: x / pH: x  Gluc: 174 mg/dL / Ketone: x  / Bili: x / Urobili: x   Blood: x / Protein: x / Nitrite: x   Leuk Esterase: x / RBC: x / WBC x   Sq Epi: x / Non Sq Epi: x / Bacteria: x                RADIOLOGY & ADDITIONAL STUDIES:

## 2023-09-04 NOTE — PROGRESS NOTE ADULT - SUBJECTIVE AND OBJECTIVE BOX
O/N Events: no acute events overnight    Subjective/ROS: Patient seen and examined at bedside. Patient complains of swelling in the balls of her feet and ankles. Patient states that when walking she desats to the 80s. No new complaints today.    Denies Fever/Chills, HA, CP, n/v, changes in bowel/urinary habits.  12pt ROS otherwise negative.    VITALS  Vital Signs Last 24 Hrs  T(C): 36.8 (04 Sep 2023 12:29), Max: 36.8 (03 Sep 2023 20:31)  T(F): 98.2 (04 Sep 2023 12:29), Max: 98.3 (03 Sep 2023 20:31)  HR: 80 (04 Sep 2023 12:29) (71 - 80)  BP: 144/82 (04 Sep 2023 12:29) (139/80 - 153/88)  BP(mean): --  RR: 18 (04 Sep 2023 12:29) (17 - 18)  SpO2: 86% (04 Sep 2023 12:29) (86% - 93%)    Parameters below as of 04 Sep 2023 12:29  Patient On (Oxygen Delivery Method): nasal cannula  O2 Flow (L/min): 2      CAPILLARY BLOOD GLUCOSE      POCT Blood Glucose.: 170 mg/dL (04 Sep 2023 13:02)  POCT Blood Glucose.: 142 mg/dL (04 Sep 2023 09:15)  POCT Blood Glucose.: 171 mg/dL (03 Sep 2023 23:45)  POCT Blood Glucose.: 139 mg/dL (03 Sep 2023 17:50)      PHYSICAL EXAM  General: NAD  Head: NC/AT; MMM; EOMI;  Neck: Supple; no JVD  Respiratory: soft crackles in BL lower lung fields   Cardiovascular: Regular rhythm/rate; S1/S2+, no murmur  Gastrointestinal: Soft; NTND; obese  Extremities: WWP; BL lymphedema present  Neurological: A&Ox3, no obvious focal deficits    MEDICATIONS  (STANDING):  cefTRIAXone   IVPB 2000 milliGRAM(s) IV Intermittent every 24 hours  dextrose 5%. 1000 milliLiter(s) (100 mL/Hr) IV Continuous <Continuous>  dextrose 5%. 1000 milliLiter(s) (50 mL/Hr) IV Continuous <Continuous>  dextrose 50% Injectable 25 Gram(s) IV Push once  dextrose 50% Injectable 12.5 Gram(s) IV Push once  dextrose 50% Injectable 25 Gram(s) IV Push once  enoxaparin Injectable 40 milliGRAM(s) SubCutaneous every 12 hours  FIRST- Mouthwash  BLM 10 milliLiter(s) Swish and Spit every 4 hours  gabapentin 300 milliGRAM(s) Oral every 8 hours  glucagon  Injectable 1 milliGRAM(s) IntraMuscular once  insulin lispro (ADMELOG) corrective regimen sliding scale   SubCutaneous Before meals and at bedtime  metFORMIN 500 milliGRAM(s) Oral two times a day  methylPREDNISolone sodium succinate Injectable 60 milliGRAM(s) IV Push every 6 hours  pantoprazole    Tablet 40 milliGRAM(s) Oral before breakfast  trimethoprim  160 mG/sulfamethoxazole 800 mG 1 Tablet(s) Oral daily    MEDICATIONS  (PRN):  acetaminophen     Tablet .. 650 milliGRAM(s) Oral every 6 hours PRN Temp greater or equal to 38C (100.4F), Mild Pain (1 - 3)  benzocaine/menthol Lozenge 1 Lozenge Oral every 4 hours PRN Sore Throat  dextrose Oral Gel 15 Gram(s) Oral once PRN Blood Glucose LESS THAN 70 milliGRAM(s)/deciliter      No Known Allergies      LABS                        15.4   13.56 )-----------( 206      ( 04 Sep 2023 06:21 )             46.2     09-04    132<L>  |  100  |  25<H>  ----------------------------<  174<H>  5.1   |  25  |  0.74    Ca    9.7      04 Sep 2023 06:21  Phos  3.8     09-04  Mg     2.0     09-04    TPro  6.9  /  Alb  3.3  /  TBili  0.4  /  DBili  x   /  AST  28  /  ALT  53<H>  /  AlkPhos  64  09-04      Urinalysis Basic - ( 04 Sep 2023 06:21 )    Color: x / Appearance: x / SG: x / pH: x  Gluc: 174 mg/dL / Ketone: x  / Bili: x / Urobili: x   Blood: x / Protein: x / Nitrite: x   Leuk Esterase: x / RBC: x / WBC x   Sq Epi: x / Non Sq Epi: x / Bacteria: x              IMAGING/EKG/ETC

## 2023-09-04 NOTE — PROGRESS NOTE ADULT - PROBLEM SELECTOR PLAN 1
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 RVP, cov negative. Hb and Hct elevated in setting of  chronic sob  CXR: pulmonary infiltrates  CTA: Bilateral interstitial lung disease. Differential diagnosis includes HP, NSIP, atypical pneumonia.    Possibly idiopathic pulmonary fibrosis with chronic duration given CTA findings and CXR. 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 Symptoms have not improved with prednisone 20mg BID, may be less likely ILD secondary to rheumatological causes. Less likely interstitial pneumonia given asymptomatic and wbc wnl. Physical exam not notable for connective tissue diseases.   Procal 0.09, legionella (-), strep pneumo (-)  8/28: patient O2 requirement 3L NC -> 4L NC  High resolution CT findings in problem 4 ground glass below  s/p bronchoscopy with BAL and biopsy  Bronch culture: H. 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 RVP, cov negative. Hb and Hct elevated in setting of  chronic sob  CXR: pulmonary infiltrates  CTA: Bilateral interstitial lung disease. Differential diagnosis includes HP, NSIP, atypical pneumonia.    Possibly idiopathic pulmonary fibrosis with chronic duration given CTA findings and CXR. 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 Symptoms have not improved with prednisone 20mg BID, may be less likely ILD secondary to rheumatological causes. Less likely interstitial pneumonia given asymptomatic and wbc wnl. Physical exam not notable for connective tissue diseases.   Procal 0.09, legionella (-), strep pneumo (-)  8/28: patient O2 requirement 3L NC -> 4L NC  High resolution CT findings in problem 4 ground glass below  s/p bronchoscopy with BAL and biopsy  Bronch culture: H. parainfluenza     - f/u pulm consult  - c/w solumedrol 60mg IV q6h (day 3/5)  - c/w protonix 40mg PO qd before breakfast   - c/w ceftriaxone 2000mg IV q24h (end 9/6)  - c/w bactrim 1 DS qd PJP prophylaxis   - incentive spirometer  - wean o2 as appropriate

## 2023-09-04 NOTE — PROGRESS NOTE ADULT - ASSESSMENT
62 year old female with a past medical history of afib, and sciatica, who presented to [facility] for shortness of breath. Pulmonary consulted for acute hypoxic respiratory failure.     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

## 2023-09-04 NOTE — PROGRESS NOTE ADULT - ASSESSMENT
62 year old female with a past medical history of afib, and sciatica, who presented to [facility] for shortness of breath found to have tropinemia and interstitial lung disease admitted for hypoxia.

## 2023-09-05 LAB
ALBUMIN SERPL ELPH-MCNC: 3.5 G/DL — SIGNIFICANT CHANGE UP (ref 3.3–5)
ALP SERPL-CCNC: 66 U/L — SIGNIFICANT CHANGE UP (ref 40–120)
ALT FLD-CCNC: 66 U/L — HIGH (ref 10–45)
ANION GAP SERPL CALC-SCNC: 10 MMOL/L — SIGNIFICANT CHANGE UP (ref 5–17)
ANISOCYTOSIS BLD QL: SLIGHT — SIGNIFICANT CHANGE UP
AST SERPL-CCNC: 28 U/L — SIGNIFICANT CHANGE UP (ref 10–40)
BASOPHILS # BLD AUTO: 0.13 K/UL — SIGNIFICANT CHANGE UP (ref 0–0.2)
BASOPHILS NFR BLD AUTO: 0.9 % — SIGNIFICANT CHANGE UP (ref 0–2)
BILIRUB SERPL-MCNC: 0.6 MG/DL — SIGNIFICANT CHANGE UP (ref 0.2–1.2)
BUN SERPL-MCNC: 26 MG/DL — HIGH (ref 7–23)
CALCIUM SERPL-MCNC: 10.1 MG/DL — SIGNIFICANT CHANGE UP (ref 8.4–10.5)
CHLORIDE SERPL-SCNC: 98 MMOL/L — SIGNIFICANT CHANGE UP (ref 96–108)
CO2 SERPL-SCNC: 24 MMOL/L — SIGNIFICANT CHANGE UP (ref 22–31)
CREAT SERPL-MCNC: 0.72 MG/DL — SIGNIFICANT CHANGE UP (ref 0.5–1.3)
EGFR: 93 ML/MIN/1.73M2 — SIGNIFICANT CHANGE UP
EOSINOPHIL # BLD AUTO: 0 K/UL — SIGNIFICANT CHANGE UP (ref 0–0.5)
EOSINOPHIL NFR BLD AUTO: 0 % — SIGNIFICANT CHANGE UP (ref 0–6)
GIANT PLATELETS BLD QL SMEAR: PRESENT — SIGNIFICANT CHANGE UP
GLUCOSE BLDC GLUCOMTR-MCNC: 144 MG/DL — HIGH (ref 70–99)
GLUCOSE BLDC GLUCOMTR-MCNC: 145 MG/DL — HIGH (ref 70–99)
GLUCOSE BLDC GLUCOMTR-MCNC: 175 MG/DL — HIGH (ref 70–99)
GLUCOSE BLDC GLUCOMTR-MCNC: 213 MG/DL — HIGH (ref 70–99)
GLUCOSE SERPL-MCNC: 164 MG/DL — HIGH (ref 70–99)
HCT VFR BLD CALC: 49.2 % — HIGH (ref 34.5–45)
HGB BLD-MCNC: 16 G/DL — HIGH (ref 11.5–15.5)
LYMPHOCYTES # BLD AUTO: 0 % — LOW (ref 13–44)
LYMPHOCYTES # BLD AUTO: 0 K/UL — LOW (ref 1–3.3)
MAGNESIUM SERPL-MCNC: 2 MG/DL — SIGNIFICANT CHANGE UP (ref 1.6–2.6)
MANUAL SMEAR VERIFICATION: SIGNIFICANT CHANGE UP
MCHC RBC-ENTMCNC: 31.2 PG — SIGNIFICANT CHANGE UP (ref 27–34)
MCHC RBC-ENTMCNC: 32.5 GM/DL — SIGNIFICANT CHANGE UP (ref 32–36)
MCV RBC AUTO: 95.9 FL — SIGNIFICANT CHANGE UP (ref 80–100)
METAMYELOCYTES # FLD: 0.9 % — HIGH (ref 0–0)
MONOCYTES # BLD AUTO: 0.5 K/UL — SIGNIFICANT CHANGE UP (ref 0–0.9)
MONOCYTES NFR BLD AUTO: 3.5 % — SIGNIFICANT CHANGE UP (ref 2–14)
NEUTROPHILS # BLD AUTO: 13.58 K/UL — HIGH (ref 1.8–7.4)
NEUTROPHILS NFR BLD AUTO: 94.7 % — HIGH (ref 43–77)
NON-GYNECOLOGICAL CYTOLOGY STUDY: SIGNIFICANT CHANGE UP
OVALOCYTES BLD QL SMEAR: SLIGHT — SIGNIFICANT CHANGE UP
PHOSPHATE SERPL-MCNC: 3.5 MG/DL — SIGNIFICANT CHANGE UP (ref 2.5–4.5)
PLAT MORPH BLD: ABNORMAL
PLATELET # BLD AUTO: 160 K/UL — SIGNIFICANT CHANGE UP (ref 150–400)
POTASSIUM SERPL-MCNC: 4.8 MMOL/L — SIGNIFICANT CHANGE UP (ref 3.5–5.3)
POTASSIUM SERPL-SCNC: 4.8 MMOL/L — SIGNIFICANT CHANGE UP (ref 3.5–5.3)
PROT SERPL-MCNC: 7.3 G/DL — SIGNIFICANT CHANGE UP (ref 6–8.3)
RBC # BLD: 5.13 M/UL — SIGNIFICANT CHANGE UP (ref 3.8–5.2)
RBC # FLD: 13.8 % — SIGNIFICANT CHANGE UP (ref 10.3–14.5)
RBC BLD AUTO: ABNORMAL
SODIUM SERPL-SCNC: 132 MMOL/L — LOW (ref 135–145)
WBC # BLD: 14.34 K/UL — HIGH (ref 3.8–10.5)
WBC # FLD AUTO: 14.34 K/UL — HIGH (ref 3.8–10.5)

## 2023-09-05 PROCEDURE — 99233 SBSQ HOSP IP/OBS HIGH 50: CPT | Mod: GC

## 2023-09-05 PROCEDURE — 99232 SBSQ HOSP IP/OBS MODERATE 35: CPT | Mod: GC

## 2023-09-05 PROCEDURE — 36000 PLACE NEEDLE IN VEIN: CPT

## 2023-09-05 PROCEDURE — 76937 US GUIDE VASCULAR ACCESS: CPT | Mod: 26

## 2023-09-05 RX ADMIN — METFORMIN HYDROCHLORIDE 500 MILLIGRAM(S): 850 TABLET ORAL at 05:54

## 2023-09-05 RX ADMIN — Medication 60 MILLIGRAM(S): at 05:54

## 2023-09-05 RX ADMIN — GABAPENTIN 300 MILLIGRAM(S): 400 CAPSULE ORAL at 05:54

## 2023-09-05 RX ADMIN — Medication 60 MILLIGRAM(S): at 17:31

## 2023-09-05 RX ADMIN — ENOXAPARIN SODIUM 40 MILLIGRAM(S): 100 INJECTION SUBCUTANEOUS at 17:31

## 2023-09-05 RX ADMIN — ENOXAPARIN SODIUM 40 MILLIGRAM(S): 100 INJECTION SUBCUTANEOUS at 05:54

## 2023-09-05 RX ADMIN — Medication 60 MILLIGRAM(S): at 11:30

## 2023-09-05 RX ADMIN — METFORMIN HYDROCHLORIDE 500 MILLIGRAM(S): 850 TABLET ORAL at 17:31

## 2023-09-05 RX ADMIN — Medication 60 MILLIGRAM(S): at 23:56

## 2023-09-05 RX ADMIN — CEFTRIAXONE 100 MILLIGRAM(S): 500 INJECTION, POWDER, FOR SOLUTION INTRAMUSCULAR; INTRAVENOUS at 12:28

## 2023-09-05 RX ADMIN — GABAPENTIN 300 MILLIGRAM(S): 400 CAPSULE ORAL at 15:49

## 2023-09-05 RX ADMIN — PANTOPRAZOLE SODIUM 40 MILLIGRAM(S): 20 TABLET, DELAYED RELEASE ORAL at 06:05

## 2023-09-05 RX ADMIN — Medication 1 TABLET(S): at 11:30

## 2023-09-05 RX ADMIN — GABAPENTIN 300 MILLIGRAM(S): 400 CAPSULE ORAL at 22:29

## 2023-09-05 NOTE — PROGRESS NOTE ADULT - SUBJECTIVE AND OBJECTIVE BOX
***** Hospital course ******  The patient is a 62 year old female with a past medical history of afib, and sciatica, who presented to [facility] for shortness of breath. Pt stated that she went to the urgent care prior to admission where she had an XR concerning for b/l lower infiltrates. She stated that she could only walk 12-15 feet before feeling short of breath. She stated that she has a pulse ox at home showing readings of 82%. She stated that she had chronic SOB for several months and got worked up in [place] where she had a CT scan that was negative for PE. She also stated that she was seen by pulmonology and rheumatology, where they ruled out lupus and other immunological disorders. Pt states that she has a cough with no sputum. Pt denies any uri sxs, chest pain, pain with breathing, abdominal pain, nvd, headaches. While at [facility] patient was underwent bronchoscopy with BAL and biopsy and pulse steroids.  ***** Hospital course ******    O/N Events: no acute events overnight.    Subjective/ROS: Patient seen and examined at bedside. Patient complains that the IV on her left arm is giving her pain. Suspect IV infiltration and will need to replace IV. Patient is amenable to additional days of IV steroids. No new complaints today.    Denies Fever/Chills, HA, CP, n/v, changes in bowel/urinary habits.  12pt ROS otherwise negative.    VITALS  Vital Signs Last 24 Hrs  T(C): 36.6 (05 Sep 2023 05:34), Max: 36.8 (04 Sep 2023 12:29)  T(F): 97.8 (05 Sep 2023 05:34), Max: 98.2 (04 Sep 2023 12:29)  HR: 82 (05 Sep 2023 05:34) (80 - 82)  BP: 152/96 (05 Sep 2023 05:34) (144/82 - 152/96)  BP(mean): --  RR: 19 (05 Sep 2023 05:34) (18 - 19)  SpO2: 94% (05 Sep 2023 05:34) (86% - 94%)    Parameters below as of 05 Sep 2023 05:34  Patient On (Oxygen Delivery Method): nasal cannula        CAPILLARY BLOOD GLUCOSE      POCT Blood Glucose.: 145 mg/dL (05 Sep 2023 09:11)  POCT Blood Glucose.: 147 mg/dL (04 Sep 2023 22:11)  POCT Blood Glucose.: 181 mg/dL (04 Sep 2023 18:15)  POCT Blood Glucose.: 170 mg/dL (04 Sep 2023 13:02)      PHYSICAL EXAM  General: NAD  Head: NC/AT; MMM; PERRL; EOMI;  Neck: Supple; no JVD  Respiratory: CTAB; no wheezes/rales/rhonchi  Cardiovascular: Regular rhythm/rate; S1/S2+, no murmurs, rubs gallops   Gastrointestinal: Soft; NTND; bowel sounds normal and present  Extremities: WWP; no edema/cyanosis  Neurological: A&Ox3, CNII-XII grossly intact; no obvious focal deficits    MEDICATIONS  (STANDING):  cefTRIAXone   IVPB 2000 milliGRAM(s) IV Intermittent every 24 hours  dextrose 5%. 1000 milliLiter(s) (50 mL/Hr) IV Continuous <Continuous>  dextrose 5%. 1000 milliLiter(s) (100 mL/Hr) IV Continuous <Continuous>  dextrose 50% Injectable 25 Gram(s) IV Push once  dextrose 50% Injectable 25 Gram(s) IV Push once  dextrose 50% Injectable 12.5 Gram(s) IV Push once  enoxaparin Injectable 40 milliGRAM(s) SubCutaneous every 12 hours  FIRST- Mouthwash  BLM 10 milliLiter(s) Swish and Spit every 4 hours  gabapentin 300 milliGRAM(s) Oral every 8 hours  glucagon  Injectable 1 milliGRAM(s) IntraMuscular once  insulin lispro (ADMELOG) corrective regimen sliding scale   SubCutaneous Before meals and at bedtime  metFORMIN 500 milliGRAM(s) Oral two times a day  methylPREDNISolone sodium succinate Injectable 60 milliGRAM(s) IV Push every 6 hours  pantoprazole    Tablet 40 milliGRAM(s) Oral before breakfast  trimethoprim  160 mG/sulfamethoxazole 800 mG 1 Tablet(s) Oral daily    MEDICATIONS  (PRN):  acetaminophen     Tablet .. 650 milliGRAM(s) Oral every 6 hours PRN Temp greater or equal to 38C (100.4F), Mild Pain (1 - 3)  benzocaine/menthol Lozenge 1 Lozenge Oral every 4 hours PRN Sore Throat  dextrose Oral Gel 15 Gram(s) Oral once PRN Blood Glucose LESS THAN 70 milliGRAM(s)/deciliter      No Known Allergies      LABS                        16.0   14.34 )-----------( 160      ( 05 Sep 2023 07:40 )             49.2     09-05    132<L>  |  98  |  26<H>  ----------------------------<  164<H>  4.8   |  24  |  0.72    Ca    10.1      05 Sep 2023 07:40  Phos  3.5     09-05  Mg     2.0     09-05    TPro  7.3  /  Alb  3.5  /  TBili  0.6  /  DBili  x   /  AST  28  /  ALT  66<H>  /  AlkPhos  66  09-05      Urinalysis Basic - ( 05 Sep 2023 07:40 )    Color: x / Appearance: x / SG: x / pH: x  Gluc: 164 mg/dL / Ketone: x  / Bili: x / Urobili: x   Blood: x / Protein: x / Nitrite: x   Leuk Esterase: x / RBC: x / WBC x   Sq Epi: x / Non Sq Epi: x / Bacteria: x              IMAGING/EKG/ETC   ***** Hospital course ******  The patient is a 62 year old female with a past medical history of afib, and sciatica, who presented to [facility] for shortness of breath. Pt stated that she went to the urgent care prior to admission where she had an XR concerning for b/l lower infiltrates. She stated that she could only walk 12-15 feet before feeling short of breath. She stated that she has a pulse ox at home showing readings of 82%. She stated that she had chronic SOB for several months and got worked up in [place] where she had a CT scan that was negative for PE. She also stated that she was seen by pulmonology and rheumatology, where they ruled out lupus and other immunological disorders. Pt states that she has a cough with no sputum. Pt denies any uri sxs, chest pain, pain with breathing, abdominal pain, nvd, headaches. While at [facility] patient was underwent bronchoscopy with BAL and biopsy and pulse steroids.   ***** Hospital course ******    O/N Events: no acute events overnight.    Subjective/ROS: Patient seen and examined at bedside. Patient complains that the IV on her left arm is giving her pain. Suspect IV infiltration and will need to replace IV. Patient is amenable to additional days of IV steroids. No new complaints today.    Denies Fever/Chills, HA, CP, n/v, changes in bowel/urinary habits.  12pt ROS otherwise negative.    VITALS  Vital Signs Last 24 Hrs  T(C): 36.6 (05 Sep 2023 05:34), Max: 36.8 (04 Sep 2023 12:29)  T(F): 97.8 (05 Sep 2023 05:34), Max: 98.2 (04 Sep 2023 12:29)  HR: 82 (05 Sep 2023 05:34) (80 - 82)  BP: 152/96 (05 Sep 2023 05:34) (144/82 - 152/96)  BP(mean): --  RR: 19 (05 Sep 2023 05:34) (18 - 19)  SpO2: 94% (05 Sep 2023 05:34) (86% - 94%)    Parameters below as of 05 Sep 2023 05:34  Patient On (Oxygen Delivery Method): nasal cannula        CAPILLARY BLOOD GLUCOSE      POCT Blood Glucose.: 145 mg/dL (05 Sep 2023 09:11)  POCT Blood Glucose.: 147 mg/dL (04 Sep 2023 22:11)  POCT Blood Glucose.: 181 mg/dL (04 Sep 2023 18:15)  POCT Blood Glucose.: 170 mg/dL (04 Sep 2023 13:02)      PHYSICAL EXAM  General: NAD  Head: NC/AT; MMM; PERRL; EOMI;  Neck: Supple; no JVD  Respiratory: CTAB upper lung fields; mild crackles in the BL lower lung fields   Cardiovascular: Regular rhythm/rate; S1/S2+, no murmurs, rubs gallops   Gastrointestinal: Soft; obese  Extremities: WWP; lymphedema in the BL lower extremities   Neurological: A&Ox3, CNII-XII grossly intact; no obvious focal deficits    MEDICATIONS  (STANDING):  cefTRIAXone   IVPB 2000 milliGRAM(s) IV Intermittent every 24 hours  dextrose 5%. 1000 milliLiter(s) (50 mL/Hr) IV Continuous <Continuous>  dextrose 5%. 1000 milliLiter(s) (100 mL/Hr) IV Continuous <Continuous>  dextrose 50% Injectable 25 Gram(s) IV Push once  dextrose 50% Injectable 25 Gram(s) IV Push once  dextrose 50% Injectable 12.5 Gram(s) IV Push once  enoxaparin Injectable 40 milliGRAM(s) SubCutaneous every 12 hours  FIRST- Mouthwash  BLM 10 milliLiter(s) Swish and Spit every 4 hours  gabapentin 300 milliGRAM(s) Oral every 8 hours  glucagon  Injectable 1 milliGRAM(s) IntraMuscular once  insulin lispro (ADMELOG) corrective regimen sliding scale   SubCutaneous Before meals and at bedtime  metFORMIN 500 milliGRAM(s) Oral two times a day  methylPREDNISolone sodium succinate Injectable 60 milliGRAM(s) IV Push every 6 hours  pantoprazole    Tablet 40 milliGRAM(s) Oral before breakfast  trimethoprim  160 mG/sulfamethoxazole 800 mG 1 Tablet(s) Oral daily    MEDICATIONS  (PRN):  acetaminophen     Tablet .. 650 milliGRAM(s) Oral every 6 hours PRN Temp greater or equal to 38C (100.4F), Mild Pain (1 - 3)  benzocaine/menthol Lozenge 1 Lozenge Oral every 4 hours PRN Sore Throat  dextrose Oral Gel 15 Gram(s) Oral once PRN Blood Glucose LESS THAN 70 milliGRAM(s)/deciliter      No Known Allergies      LABS                        16.0   14.34 )-----------( 160      ( 05 Sep 2023 07:40 )             49.2     09-05    132<L>  |  98  |  26<H>  ----------------------------<  164<H>  4.8   |  24  |  0.72    Ca    10.1      05 Sep 2023 07:40  Phos  3.5     09-05  Mg     2.0     09-05    TPro  7.3  /  Alb  3.5  /  TBili  0.6  /  DBili  x   /  AST  28  /  ALT  66<H>  /  AlkPhos  66  09-05      Urinalysis Basic - ( 05 Sep 2023 07:40 )    Color: x / Appearance: x / SG: x / pH: x  Gluc: 164 mg/dL / Ketone: x  / Bili: x / Urobili: x   Blood: x / Protein: x / Nitrite: x   Leuk Esterase: x / RBC: x / WBC x   Sq Epi: x / Non Sq Epi: x / Bacteria: x              IMAGING/EKG/ETC

## 2023-09-05 NOTE — PROGRESS NOTE ADULT - ASSESSMENT
62 year old female with a past medical history of afib, and sciatica, who presented to [facility] for shortness of breath found to have tropinemia and interstitial lung disease admitted for hypoxia.

## 2023-09-05 NOTE — PROGRESS NOTE ADULT - SUBJECTIVE AND OBJECTIVE BOX
PULMONARY CONSULT SERVICE FOLLOW-UP NOTE    INTERVAL HPI:  Reviewed chart and overnight events; patient seen and examined at bedside. She feels a little better. She would like to know what the long-term plan is to manage her ILD.    MEDICATIONS:  Pulmonary:    Antimicrobials:  cefTRIAXone   IVPB 2000 milliGRAM(s) IV Intermittent every 24 hours  trimethoprim  160 mG/sulfamethoxazole 800 mG 1 Tablet(s) Oral daily    Anticoagulants:  enoxaparin Injectable 40 milliGRAM(s) SubCutaneous every 12 hours    Cardiac:      Allergies    No Known Allergies    Intolerances        Vital Signs Last 24 Hrs  T(C): 36.6 (05 Sep 2023 05:34), Max: 36.8 (04 Sep 2023 12:29)  T(F): 97.8 (05 Sep 2023 05:34), Max: 98.2 (04 Sep 2023 12:29)  HR: 82 (05 Sep 2023 05:34) (80 - 82)  BP: 152/96 (05 Sep 2023 05:34) (144/82 - 152/96)  BP(mean): --  RR: 19 (05 Sep 2023 05:34) (18 - 19)  SpO2: 94% (05 Sep 2023 05:34) (86% - 94%)    Parameters below as of 05 Sep 2023 05:34  Patient On (Oxygen Delivery Method): nasal cannula            PHYSICAL EXAM:  Constitutional: WD  HEENT: NC/AT; PERRL, anicteric sclera; MMM  Neck: supple  Lymph: No supraclavical LAD or cervical chain LAD  Cardiovascular: +S1/S2, RRR  Respiratory: CTA B/L; no W/R/R  Gastrointestinal: soft, NT/ND  Extremities: WWP; no edema, clubbing or cyanosis  Vascular: 2+ radial and pedal pulses  Neurological: AAOx3; no focal deficits    LABS:      CBC Full  -  ( 05 Sep 2023 07:40 )  WBC Count : 14.34 K/uL  RBC Count : 5.13 M/uL  Hemoglobin : 16.0 g/dL  Hematocrit : 49.2 %  Platelet Count - Automated : 160 K/uL  Mean Cell Volume : 95.9 fl  Mean Cell Hemoglobin : 31.2 pg  Mean Cell Hemoglobin Concentration : 32.5 gm/dL  Auto Neutrophil # : 13.58 K/uL  Auto Lymphocyte # : 0.00 K/uL  Auto Monocyte # : 0.50 K/uL  Auto Eosinophil # : 0.00 K/uL  Auto Basophil # : 0.13 K/uL  Auto Neutrophil % : 94.7 %  Auto Lymphocyte % : 0.0 %  Auto Monocyte % : 3.5 %  Auto Eosinophil % : 0.0 %  Auto Basophil % : 0.9 %    09-05    132<L>  |  98  |  26<H>  ----------------------------<  164<H>  4.8   |  24  |  0.72    Ca    10.1      05 Sep 2023 07:40  Phos  3.5     09-05  Mg     2.0     09-05    TPro  7.3  /  Alb  3.5  /  TBili  0.6  /  DBili  x   /  AST  28  /  ALT  66<H>  /  AlkPhos  66  09-05          Urinalysis Basic - ( 05 Sep 2023 07:40 )    Color: x / Appearance: x / SG: x / pH: x  Gluc: 164 mg/dL / Ketone: x  / Bili: x / Urobili: x   Blood: x / Protein: x / Nitrite: x   Leuk Esterase: x / RBC: x / WBC x   Sq Epi: x / Non Sq Epi: x / Bacteria: x    RADIOLOGY & ADDITIONAL STUDIES:    < from: Xray Chest 1 View-PORTABLE IMMEDIATE (Xray Chest 1 View-PORTABLE IMMEDIATE .) (08.30.23 @ 13:31) >  ACC: [id number] EXAM:  XR CHEST PORTABLE IMMED 1V   ORDERED BY: FRED CHAVEZ     PROCEDURE DATE:  08/30/2023          INTERPRETATION:  PORTABLE CHEST X-RAY    HISTORY: Cough    PRIOR STUDIES: CT chest August 28, 2023.    IMPRESSION: No significantchange bilateral lung markings compared to    radiograph August 28, 2023. No pneumothorax or pleural effusion.   Stable cardiac mediastinal silhouette.    --- End of Report ---      < end of copied text >

## 2023-09-05 NOTE — PROGRESS NOTE ADULT - ASSESSMENT
62 year old female with a past medical history of afib, and sciatica, who presented to [facility] for shortness of breath. Pulmonary consulted for acute hypoxic respiratory failure.     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

## 2023-09-05 NOTE — PROGRESS NOTE ADULT - PROBLEM SELECTOR PLAN 2
ARIANA (1:1280 speckled), anti-smith (positive), complement levels (C3 normal, C4 low), anti RNP (elevated; >8)  SARS COV-2 spike protein antibody positive; CRP and ESR elevated    Hypersensitivity pneumonitis panel negative  Uploaded prior CT chest from 6/2023 that looks essentially normal.  TTE wnl    -Continue IV solumedrol 60mg q6h for an additional 2 days (finish on 9/6 noon dose; then transition to oral prednisone) and bactrim PJP ppx (eg, double strength 1 tablet daily), PPI  -IV Ceftriaxone 5 days out of an abundance of precaution to cover BAL specimen  -PT evaluation and document ambulatory saturation if possible; will likely require establishment of home oxygen prior to discharge  -Nutrition counseling and weight loss  -Will require dedicated outpatient pulmonary follow up with Dr. Whitney in 2 weeks with formal PFTs and a 6MWT.  -Repeat CT chest in 6 weeks.  -Will need to walk with suppl O2 prior to discharge; will call 48 hours after discharge to see if the patient is okay. ARIANA (1:1280 speckled), anti-smith (positive), complement levels (C3 normal, C4 low), anti RNP (elevated; >8)  SARS COV-2 spike protein antibody positive; CRP and ESR elevated    Hypersensitivity pneumonitis panel negative  Uploaded prior CT chest from 6/2023 that looks essentially normal.  TTE wnl    -Continue IV solumedrol 60mg q6h for an additional 2 days (finish on 9/6 noon dose; then transition to a taper and bactrim PJP ppx (eg, double strength 1 tablet daily), PPI  -IV Ceftriaxone 5 days out of an abundance of precaution to cover BAL specimen  -PT evaluation and document ambulatory saturation if possible; will likely require establishment of home oxygen prior to discharge  -Nutrition counseling and weight loss  -Will require dedicated outpatient pulmonary follow up with Dr. Whitney in 2 weeks with formal PFTs and a 6MWT.  -Repeat CT chest in 6 weeks.  -Will need to walk with suppl O2 prior to discharge; will call 48 hours after discharge to see if the patient is okay.

## 2023-09-05 NOTE — PROGRESS NOTE ADULT - ATTENDING COMMENTS
Patient was seen and examined at bedside on 9/5/2023 at 1230 pm. Patient reports that she feels mostly unchanged. Denies SOB, CP, dizziness, abdominal pain, N/V. ROS is otherwise negative. Vitals, labwork and pertinent imaging reviewed. Physical exam - NAD, AAO x 4, PERRLA, EOMI, supple neck, chest - CTA b/l, CV - rrr, s1s2, no m/r/g, abd - soft, + BS, ext - WWP, psych - normal affect, skin - no rash, back - midline    Plan  -C/w high dose Steroids - Solumedrol 60 mg IV q6, Protonix 40 mg PO qd  -S/p 5 day course of CTX given + cultures (H influenza)  -Continue to monitor oxygen requirements  -C/w Metformin given increased FS

## 2023-09-05 NOTE — PROGRESS NOTE ADULT - PROBLEM SELECTOR PLAN 6
Pt states she had afib 1 month ago. Has not been taking medications.    - not currently in afib  - consider lopressor 12.5 Notification Instructions: Patient will be notified of biopsy results. However, patient instructed to call the office if not contacted within 2 weeks.

## 2023-09-05 NOTE — PROGRESS NOTE ADULT - PROBLEM SELECTOR PLAN 1
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 RVP, cov negative. Hb and Hct elevated in setting of  chronic sob  CXR: pulmonary infiltrates  CTA: Bilateral interstitial lung disease. Differential diagnosis includes HP, NSIP, atypical pneumonia.    Possibly idiopathic pulmonary fibrosis with chronic duration given CTA findings and CXR. 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 Symptoms have not improved with prednisone 20mg BID, may be less likely ILD secondary to rheumatological causes. Less likely interstitial pneumonia given asymptomatic and wbc wnl. Physical exam not notable for connective tissue diseases.   Procal 0.09, legionella (-), strep pneumo (-)  8/28: patient O2 requirement 3L NC -> 4L NC  High resolution CT findings in problem 4 ground glass below  s/p bronchoscopy with BAL and biopsy  Bronch culture: H. 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 RVP, cov negative. Hb and Hct elevated in setting of  chronic sob  CXR: pulmonary infiltrates  CTA: Bilateral interstitial lung disease. Differential diagnosis includes HP, NSIP, atypical pneumonia.    Possibly idiopathic pulmonary fibrosis with chronic duration given CTA findings and CXR. 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 Symptoms have not improved with prednisone 20mg BID, may be less likely ILD secondary to rheumatological causes. Less likely interstitial pneumonia given asymptomatic and wbc wnl. Physical exam not notable for connective tissue diseases.   Procal 0.09, legionella (-), strep pneumo (-)  8/28: patient O2 requirement 3L NC -> 4L NC  High resolution CT findings in problem 4 ground glass below  s/p bronchoscopy with BAL and biopsy  Bronch culture: H. parainfluenza     - f/u pulm consult  - c/w solumedrol 60mg IV q6h (re-evaluation 9/6)  - c/w protonix 40mg PO qd before breakfast   - c/w ceftriaxone 2000mg IV q24h (end 9/6)  - c/w bactrim 1 DS qd PJP prophylaxis   - incentive spirometer  - wean o2 as appropriate

## 2023-09-06 LAB
GALACTOMANNAN AG SERPL-ACNC: 1.75 INDEX — HIGH (ref 0–0.49)
GLUCOSE BLDC GLUCOMTR-MCNC: 174 MG/DL — HIGH (ref 70–99)
GLUCOSE BLDC GLUCOMTR-MCNC: 182 MG/DL — HIGH (ref 70–99)
GLUCOSE BLDC GLUCOMTR-MCNC: 186 MG/DL — HIGH (ref 70–99)
GLUCOSE BLDC GLUCOMTR-MCNC: 214 MG/DL — HIGH (ref 70–99)

## 2023-09-06 PROCEDURE — 99232 SBSQ HOSP IP/OBS MODERATE 35: CPT | Mod: GC

## 2023-09-06 RX ADMIN — PANTOPRAZOLE SODIUM 40 MILLIGRAM(S): 20 TABLET, DELAYED RELEASE ORAL at 06:14

## 2023-09-06 RX ADMIN — GABAPENTIN 300 MILLIGRAM(S): 400 CAPSULE ORAL at 13:09

## 2023-09-06 RX ADMIN — ENOXAPARIN SODIUM 40 MILLIGRAM(S): 100 INJECTION SUBCUTANEOUS at 17:12

## 2023-09-06 RX ADMIN — Medication 1 TABLET(S): at 11:22

## 2023-09-06 RX ADMIN — GABAPENTIN 300 MILLIGRAM(S): 400 CAPSULE ORAL at 22:02

## 2023-09-06 RX ADMIN — METFORMIN HYDROCHLORIDE 500 MILLIGRAM(S): 850 TABLET ORAL at 06:12

## 2023-09-06 RX ADMIN — Medication 60 MILLIGRAM(S): at 06:12

## 2023-09-06 RX ADMIN — Medication 60 MILLIGRAM(S): at 11:22

## 2023-09-06 RX ADMIN — GABAPENTIN 300 MILLIGRAM(S): 400 CAPSULE ORAL at 06:13

## 2023-09-06 RX ADMIN — Medication 40 MILLIGRAM(S): at 23:43

## 2023-09-06 RX ADMIN — METFORMIN HYDROCHLORIDE 500 MILLIGRAM(S): 850 TABLET ORAL at 17:07

## 2023-09-06 RX ADMIN — Medication 40 MILLIGRAM(S): at 17:07

## 2023-09-06 RX ADMIN — ENOXAPARIN SODIUM 40 MILLIGRAM(S): 100 INJECTION SUBCUTANEOUS at 06:12

## 2023-09-06 NOTE — PROGRESS NOTE ADULT - PROBLEM SELECTOR PLAN 1
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 RVP, cov negative. Hb and Hct elevated in setting of  chronic sob  CXR: pulmonary infiltrates  CTA: Bilateral interstitial lung disease. Differential diagnosis includes HP, NSIP, atypical pneumonia.    Possibly idiopathic pulmonary fibrosis with chronic duration given CTA findings and CXR. 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 Symptoms have not improved with prednisone 20mg BID, may be less likely ILD secondary to rheumatological causes. Less likely interstitial pneumonia given asymptomatic and wbc wnl. Physical exam not notable for connective tissue diseases.   Procal 0.09, legionella (-), strep pneumo (-)  8/28: patient O2 requirement 3L NC -> 4L NC  High resolution CT findings in problem 4 ground glass below  s/p bronchoscopy with BAL and biopsy  Bronch culture: H. 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 Hb and Hct elevated in setting of chronic sob. CXR revealed ground glass opacity. CTA revealed interstitial lung disease.  Infectious workup negative so far. Hypersensitivity pneumonitis panel wnl. Ambulatory sat 88% 6/5, 87% 9/6. s/p solu-medrol 60mg course.     - Solu-medrol taper 40mg q6hr x1 9/6. Reassess 9/7  - f/u rheum consult and workup  - continue to monitor sxs and obtain ambulatory sats.

## 2023-09-06 NOTE — PROGRESS NOTE ADULT - PROBLEM SELECTOR PLAN 2
CXR revealed ground glass opacity. CTA revealed interstitial lung disease.  Procal 0.09  Anti- SSA 0.4, SSB <0.2, rheumatoid factor quant <10, CCP (-), centromere ab <0.2, antiribonucleoprotein >8.0  c-ANCA (-), p-ANCA (-), atypical ANCA indeterminate, ARIANA speckled, Anti-nuclear factor titer 1:1280, DS DNA <12,   Alejandro >8, RNA polymerase III IG 16,   C4 12, C3 99,   Hypersensitivity pneumonitis panel wnl    High resolution CT:  1. Since August 26, 2023, again interstitial lung disease non-UIP pattern. Although no subpleural sparing, possibly interstitial pneumonia, differential includes NSIP associated with connective tissue disorders, chronic HP or drug toxicity.    - f/u rheum consult  - f/u rheumatologic workup: myomarker panel and systemic sclerosis panel  - continue to monitor sxs STABLE and currently in sinus rhythm. Pt states she had afib 1 month ago. Has not been taking medications.     - Lovenox AC   - consider lopressor 12.5 if afib

## 2023-09-06 NOTE — PROGRESS NOTE ADULT - PROBLEM SELECTOR PLAN 3
No acs sxs. Elevated troponin, downtrending. In the setting of chronic sob and dyspnea on exertion. BNP wnl  A1c 6.1  Cholesterol 152, triglycerides 104, HDL 32, LDL 99  TTE:  1. Normal left and right ventricular size and systolic function.  2. Normal atria.  3. No significant valvular disease.  4. No evidence of pulmonary hypertension.  5. No pericardial effusion.  6. No prior echo is available for comparison.    - f/c cardiology rec - recommending work up for ILD in patient with normal TTE outpatient in June. STABLE. BL hand and arm numbness 2/2 to radiculopathy. Patient states that numbness in the BL UE has been increasing due to sleeping on hospital mattress. Home meds prednisone 20mg BID, gabapentin 100g TID. PT consult 8/29 needed no additional PT needs.     - f/u neuro recs  - c/w gabapentin 300 mg TID

## 2023-09-06 NOTE — PROGRESS NOTE ADULT - SUBJECTIVE AND OBJECTIVE BOX
OVERNIGHT EVENTS: NAEO    SUBJECTIVE  Pt was seen and examined at bedside. Denies chest pain, palpitation. Reports MANZANO with O2 sat dropping to 85-88s but would return to 92-94 baseline within 1-2 minutes of resting.     Reports no other active concerns.         T(C): 36.9 (09-06-23 @ 05:36), Max: 36.9 (09-06-23 @ 05:36)  HR: 78 (09-06-23 @ 05:36) (78 - 78)  BP: 127/87 (09-06-23 @ 05:36) (127/87 - 142/89)  RR: 19 (09-06-23 @ 05:36) (18 - 19)  SpO2: 93% (09-06-23 @ 05:36) (88% - 95%)    PHYSICAL EXAM     General: NAD  HEENT: NC/AT; PERRL; Neck Supple; no JVD  Respiratory: CTAB; no wheezes/rales/rhonchi  Cardiovascular: Regular rhythm/rate, + S1/S2; no murmurs, rubs gallops   Gastrointestinal: Soft; NTND; bowel sounds normal and present  Extremities: WWP; no edema/cyanosis  Neurological: A&Ox3, CNII-XII grossly intact; no obvious focal deficits  Integument: intact; normal turgor, texture, temperature, color for age    Vitals:  ============  T(C): 36.9 (09-06-23 @ 05:36), Max: 36.9 (09-06-23 @ 05:36)  HR: 78 (09-06-23 @ 05:36) (78 - 78)  BP: 127/87 (09-06-23 @ 05:36) (127/87 - 142/89)  RR: 19 (09-06-23 @ 05:36) (18 - 19)  SpO2: 93% (09-06-23 @ 05:36) (88% - 95%)        =======================================================  Current Antibiotics:  trimethoprim  160 mG/sulfamethoxazole 800 mG 1 Tablet(s) Oral daily    Other medications:  dextrose 5%. 1000 milliLiter(s) IV Continuous <Continuous>  dextrose 5%. 1000 milliLiter(s) IV Continuous <Continuous>  dextrose 50% Injectable 25 Gram(s) IV Push once  dextrose 50% Injectable 25 Gram(s) IV Push once  dextrose 50% Injectable 12.5 Gram(s) IV Push once  enoxaparin Injectable 40 milliGRAM(s) SubCutaneous every 12 hours  FIRST- Mouthwash  BLM 10 milliLiter(s) Swish and Spit every 4 hours  gabapentin 300 milliGRAM(s) Oral every 8 hours  glucagon  Injectable 1 milliGRAM(s) IntraMuscular once  insulin lispro (ADMELOG) corrective regimen sliding scale   SubCutaneous Before meals and at bedtime  metFORMIN 500 milliGRAM(s) Oral two times a day  methylPREDNISolone sodium succinate Injectable 40 milliGRAM(s) IV Push every 6 hours  pantoprazole    Tablet 40 milliGRAM(s) Oral before breakfast      =======================================================  Labs:                        16.0   14.34 )-----------( 160      ( 05 Sep 2023 07:40 )             49.2     09-05    132<L>  |  98  |  26<H>  ----------------------------<  164<H>  4.8   |  24  |  0.72    Ca    10.1      05 Sep 2023 07:40  Phos  3.5     09-05  Mg     2.0     09-05    TPro  7.3  /  Alb  3.5  /  TBili  0.6  /  DBili  x   /  AST  28  /  ALT  66<H>  /  AlkPhos  66  09-05      Culture - Fungal, Bronchial (collected 08-30-23 @ 12:54)  Source: .Bronchial RLL BAL    Culture - Bronchial (collected 08-30-23 @ 12:54)  Source: Lavage RLL BAL  Gram Stain (08-30-23 @ 16:26):    Rare epithelial cells    Rare WBC's    Few Gram Positive Rods    Few Gram Positive Cocci in Pairs and Chains  Final Report (09-01-23 @ 14:22):    Normal Respiratory Noreen present including    7,000 CFU/ml Haemophilus parainfluenzae       OVERNIGHT EVENTS: NAEO    SUBJECTIVE  Pt was seen and examined at bedside. Denies chest pain, palpitation. Reports MANZANO with O2 sat dropping to 85-88s but would return to 92-94 baseline within 1-2 minutes of resting.   No other active concerns.         T(C): 36.9 (09-06-23 @ 05:36), Max: 36.9 (09-06-23 @ 05:36)  HR: 78 (09-06-23 @ 05:36) (78 - 78)  BP: 127/87 (09-06-23 @ 05:36) (127/87 - 142/89)  RR: 19 (09-06-23 @ 05:36) (18 - 19)  SpO2: 93% (09-06-23 @ 05:36) (88% - 95%)    PHYSICAL EXAM     General: NAD  HEENT: NC/AT; PERRL; Neck Supple; no JVD  Respiratory: CTAB; no wheezes/rales/rhonchi  Cardiovascular: Regular rhythm/rate, + S1/S2; no murmurs, rubs gallops   Gastrointestinal: Soft; NTND; bowel sounds normal and present  Extremities: WWP; 1+ nonpitting b/l LE, cyanosis, 2+ distal pulses b/l   Neurological: A&Ox3, no obvious focal deficits  Integument: intact; normal turgor, texture, temperature, color for age    Vitals:  ============  T(C): 36.9 (09-06-23 @ 05:36), Max: 36.9 (09-06-23 @ 05:36)  HR: 78 (09-06-23 @ 05:36) (78 - 78)  BP: 127/87 (09-06-23 @ 05:36) (127/87 - 142/89)  RR: 19 (09-06-23 @ 05:36) (18 - 19)  SpO2: 93% (09-06-23 @ 05:36) (88% - 95%)        =======================================================  Current Antibiotics:  trimethoprim  160 mG/sulfamethoxazole 800 mG 1 Tablet(s) Oral daily    Other medications:  dextrose 5%. 1000 milliLiter(s) IV Continuous <Continuous>  dextrose 5%. 1000 milliLiter(s) IV Continuous <Continuous>  dextrose 50% Injectable 25 Gram(s) IV Push once  dextrose 50% Injectable 25 Gram(s) IV Push once  dextrose 50% Injectable 12.5 Gram(s) IV Push once  enoxaparin Injectable 40 milliGRAM(s) SubCutaneous every 12 hours  FIRST- Mouthwash  BLM 10 milliLiter(s) Swish and Spit every 4 hours  gabapentin 300 milliGRAM(s) Oral every 8 hours  glucagon  Injectable 1 milliGRAM(s) IntraMuscular once  insulin lispro (ADMELOG) corrective regimen sliding scale   SubCutaneous Before meals and at bedtime  metFORMIN 500 milliGRAM(s) Oral two times a day  methylPREDNISolone sodium succinate Injectable 40 milliGRAM(s) IV Push every 6 hours  pantoprazole    Tablet 40 milliGRAM(s) Oral before breakfast      =======================================================  Labs:                        16.0   14.34 )-----------( 160      ( 05 Sep 2023 07:40 )             49.2     09-05    132<L>  |  98  |  26<H>  ----------------------------<  164<H>  4.8   |  24  |  0.72    Ca    10.1      05 Sep 2023 07:40  Phos  3.5     09-05  Mg     2.0     09-05    TPro  7.3  /  Alb  3.5  /  TBili  0.6  /  DBili  x   /  AST  28  /  ALT  66<H>  /  AlkPhos  66  09-05      Culture - Fungal, Bronchial (collected 08-30-23 @ 12:54)  Source: .Bronchial RLL BAL    Culture - Bronchial (collected 08-30-23 @ 12:54)  Source: Lavage RLL BAL  Gram Stain (08-30-23 @ 16:26):    Rare epithelial cells    Rare WBC's    Few Gram Positive Rods    Few Gram Positive Cocci in Pairs and Chains  Final Report (09-01-23 @ 14:22):    Normal Respiratory Noreen present including    7,000 CFU/ml Haemophilus parainfluenzae

## 2023-09-06 NOTE — PROGRESS NOTE ADULT - PROBLEM SELECTOR PLAN 2
ARIANA (1:1280 speckled), anti-smith (positive), complement levels (C3 normal, C4 low), anti RNP (elevated; >8)  SARS COV-2 spike protein antibody positive; CRP and ESR elevated    Hypersensitivity pneumonitis panel negative  Uploaded prior CT chest from 6/2023 that looks essentially normal.  TTE wnl    -Continue IV solumedrol 60mg q6h for an additional 2 days (finish on 9/6 noon dose; then transition to a taper and bactrim PJP ppx (eg, double strength 1 tablet daily), PPI  -IV Ceftriaxone 5 days out of an abundance of precaution to cover BAL specimen  -PT evaluation and document ambulatory saturation if possible; will likely require establishment of home oxygen prior to discharge  -Nutrition counseling and weight loss  -Will require dedicated outpatient pulmonary follow up with Dr. Whitney in 2 weeks with formal PFTs and a 6MWT.  -Repeat CT chest in 6 weeks.  -Will need to walk with suppl O2 prior to discharge; will call 48 hours after discharge to see if the patient is okay. ARIANA (1:1280 speckled), anti-smith (positive), complement levels (C3 normal, C4 low), anti RNP (elevated; >8)  SARS COV-2 spike protein antibody positive; CRP and ESR elevated    Hypersensitivity pneumonitis panel negative  Uploaded prior CT chest from 6/2023 that looks essentially normal.  TTE wnl    -Continue IV solumedrol but please taper to 40 mg IV q6 starting today, and then 30 mg IV q6 tomorrow, followed by 20 mg IV q6 on Friday  -If she continues to improve we will plan to discharge on Saturday on PO methylprednisolone 20 mg qd   -IV Ceftriaxone 5 days total out of an abundance of precaution to cover BAL specimen  -PT evaluation and document ambulatory saturation if possible; will likely require establishment of home oxygen prior to discharge  -Nutrition counseling and weight loss  -Will require dedicated outpatient pulmonary follow up with Dr. Whitney in 2 weeks from discharge with formal PFTs and a 6MWT.  -Repeat CT chest in 6 weeks.  -Will need to walk with suppl O2 prior to discharge; will call 48 hours after discharge to see if the patient is okay.

## 2023-09-06 NOTE — PROGRESS NOTE ADULT - SUBJECTIVE AND OBJECTIVE BOX
PULMONARY CONSULT SERVICE FOLLOW-UP NOTE    INTERVAL HPI:  Reviewed chart and overnight events; patient seen and examined at bedside. She feels about the same. Has no new complaints. She says she walked yesterday around the room and was a little short of breath.     MEDICATIONS:  Pulmonary:    Antimicrobials:  trimethoprim  160 mG/sulfamethoxazole 800 mG 1 Tablet(s) Oral daily    Anticoagulants:  enoxaparin Injectable 40 milliGRAM(s) SubCutaneous every 12 hours    Cardiac:      Allergies    No Known Allergies    Intolerances        Vital Signs Last 24 Hrs  T(C): 36.9 (06 Sep 2023 05:36), Max: 36.9 (06 Sep 2023 05:36)  T(F): 98.4 (06 Sep 2023 05:36), Max: 98.4 (06 Sep 2023 05:36)  HR: 78 (06 Sep 2023 05:36) (78 - 78)  BP: 127/87 (06 Sep 2023 05:36) (127/87 - 146/84)  BP(mean): --  RR: 19 (06 Sep 2023 05:36) (18 - 19)  SpO2: 93% (06 Sep 2023 05:36) (88% - 95%)    Parameters below as of 06 Sep 2023 05:36  Patient On (Oxygen Delivery Method): nasal cannula            PHYSICAL EXAM:  Constitutional: WD  HEENT: NC/AT; PERRL, anicteric sclera; MMM  Neck: supple  Lymph: No supraclavical LAD or cervical chain LAD  Cardiovascular: +S1/S2, RRR  Respiratory: Faint inspiratory crackles throughout bilaterally  Gastrointestinal: soft, NT/ND  Extremities: WWP; no edema, clubbing or cyanosis  Vascular: 2+ radial and pedal pulses  Neurological: AAOx3; no focal deficits    LABS:      CBC Full  -  ( 05 Sep 2023 07:40 )  WBC Count : 14.34 K/uL  RBC Count : 5.13 M/uL  Hemoglobin : 16.0 g/dL  Hematocrit : 49.2 %  Platelet Count - Automated : 160 K/uL  Mean Cell Volume : 95.9 fl  Mean Cell Hemoglobin : 31.2 pg  Mean Cell Hemoglobin Concentration : 32.5 gm/dL  Auto Neutrophil # : 13.58 K/uL  Auto Lymphocyte # : 0.00 K/uL  Auto Monocyte # : 0.50 K/uL  Auto Eosinophil # : 0.00 K/uL  Auto Basophil # : 0.13 K/uL  Auto Neutrophil % : 94.7 %  Auto Lymphocyte % : 0.0 %  Auto Monocyte % : 3.5 %  Auto Eosinophil % : 0.0 %  Auto Basophil % : 0.9 %    09-05    132<L>  |  98  |  26<H>  ----------------------------<  164<H>  4.8   |  24  |  0.72    Ca    10.1      05 Sep 2023 07:40  Phos  3.5     09-05  Mg     2.0     09-05    TPro  7.3  /  Alb  3.5  /  TBili  0.6  /  DBili  x   /  AST  28  /  ALT  66<H>  /  AlkPhos  66  09-05          Urinalysis Basic - ( 05 Sep 2023 07:40 )    Color: x / Appearance: x / SG: x / pH: x  Gluc: 164 mg/dL / Ketone: x  / Bili: x / Urobili: x   Blood: x / Protein: x / Nitrite: x   Leuk Esterase: x / RBC: x / WBC x   Sq Epi: x / Non Sq Epi: x / Bacteria: x                RADIOLOGY & ADDITIONAL STUDIES:

## 2023-09-06 NOTE — PROGRESS NOTE ADULT - PROBLEM SELECTOR PROBLEM 7
Sciatica

## 2023-09-06 NOTE — PROGRESS NOTE ADULT - ATTENDING COMMENTS
Patient was seen and examined at bedside on 9/6/2023 at 1230 pm. Patient reports that she feels mostly unchanged. Denies SOB, CP, dizziness, abdominal pain, N/V. ROS is otherwise negative. Vitals, labwork and pertinent imaging reviewed. Physical exam - NAD, AAO x 4, PERRLA, EOMI, supple neck, chest - CTA b/l, CV - rrr, s1s2, no m/r/g, abd - soft, + BS, ext - WWP, psych - normal affect, skin - no rash, back - midline    Plan  -C/w high dose Steroids - Solumedrol 60 mg IV q6, Protonix 40 mg PO qd, plan to taper and switch to PO on 9/9  -S/p 5 day course of CTX given + cultures (H influenza)  -Continue to monitor oxygen requirements  -C/w Metformin given increased FS

## 2023-09-06 NOTE — CHART NOTE - NSCHARTNOTEFT_GEN_A_CORE
Admitting Diagnosis:   Patient is a 62y old  Female who presents with a chief complaint of Shortness of breath (06 Sep 2023 10:53)      PAST MEDICAL & SURGICAL HISTORY:      Current Nutrition Order: DASH/TLC    PO Intake: Good (%) [ X ]  Fair (50-75%) [   ] Poor (<25%) [   ]    GI Issues: No N/V/D/C    Pain: No pain noted    Skin Integrity: Apolinar score 20; 1+ edema L/R foot    Labs:   09-05    132<L>  |  98  |  26<H>  ----------------------------<  164<H>  4.8   |  24  |  0.72    Ca    10.1      05 Sep 2023 07:40  Phos  3.5     09-05  Mg     2.0     09-05    TPro  7.3  /  Alb  3.5  /  TBili  0.6  /  DBili  x   /  AST  28  /  ALT  66<H>  /  AlkPhos  66  09-05    CAPILLARY BLOOD GLUCOSE      POCT Blood Glucose.: 174 mg/dL (06 Sep 2023 08:50)  POCT Blood Glucose.: 175 mg/dL (05 Sep 2023 22:32)  POCT Blood Glucose.: 144 mg/dL (05 Sep 2023 18:00)      Medications:  MEDICATIONS  (STANDING):  dextrose 5%. 1000 milliLiter(s) (50 mL/Hr) IV Continuous <Continuous>  dextrose 5%. 1000 milliLiter(s) (100 mL/Hr) IV Continuous <Continuous>  dextrose 50% Injectable 25 Gram(s) IV Push once  dextrose 50% Injectable 25 Gram(s) IV Push once  dextrose 50% Injectable 12.5 Gram(s) IV Push once  enoxaparin Injectable 40 milliGRAM(s) SubCutaneous every 12 hours  FIRST- Mouthwash  BLM 10 milliLiter(s) Swish and Spit every 4 hours  gabapentin 300 milliGRAM(s) Oral every 8 hours  glucagon  Injectable 1 milliGRAM(s) IntraMuscular once  insulin lispro (ADMELOG) corrective regimen sliding scale   SubCutaneous Before meals and at bedtime  metFORMIN 500 milliGRAM(s) Oral two times a day  methylPREDNISolone sodium succinate Injectable 40 milliGRAM(s) IV Push every 6 hours  pantoprazole    Tablet 40 milliGRAM(s) Oral before breakfast  trimethoprim  160 mG/sulfamethoxazole 800 mG 1 Tablet(s) Oral daily    MEDICATIONS  (PRN):  acetaminophen     Tablet .. 650 milliGRAM(s) Oral every 6 hours PRN Temp greater or equal to 38C (100.4F), Mild Pain (1 - 3)  benzocaine/menthol Lozenge 1 Lozenge Oral every 4 hours PRN Sore Throat  dextrose Oral Gel 15 Gram(s) Oral once PRN Blood Glucose LESS THAN 70 milliGRAM(s)/deciliter    Admission Anthropometrics:  Height for BMI (FEET)	5 Feet  Height for BMI (INCHES)	8 Inch(s)  Height for BMI (CENTIMETERS)	172.72 Centimeter(s)  Weight for BMI (lbs)	288.4 lb  Weight for BMI (kg)	130.8 kg  Body Mass Index	43.8      Estimated energy needs:   Estimated Energy Needs From (leo/kg)	25  Estimated Energy Needs To (leo/kg)	30  Estimated Energy Needs Calculated From (leo/kg)	1587  Estimated Energy Needs Calculated To (leo/kg)	1905    Estimated Protein Needs From (g/kg)	0.8  Estimated Protein Needs To (g/kg)	1  Estimated Protein Needs Calculated From (g/kg)	50.8  Estimated Protein Needs Calculated To (g/kg)	63.5    Other Calculations	Based on Standards of Care pt within % IBW (206%) thus IBW used for all calculations (140#). Fluid recs per team.    Subjective:   62 year old female with a past medical history of afib, and sciatica, who presented to [facility] for shortness of breath. Pulmonary consulted for acute hypoxic respiratory failure.         Previous Nutrition Diagnosis:  Overweight/Obesity related to excessive energy intake as evidenced by BMI 44    Active [ X  ]  Resolved [   ]    Goal: Pt to recall and verbalize at least 3 nutrition education points    Recommendations:  1. Continue with diet order   2. Encourage pt to meet nutritional needs as able   3. Monitor labs: electrolytes, cmp  4. Monitor weights   5. Pain and bowel regimen per team   6. Will continue to assess/honor food preferences as able  7. Monitor adherence to diet education      Education: Reviewed general healthful diet, outpatient nutrition program    Risk Level: High [   ] Moderate [  X ] Low [   ] Admitting Diagnosis:   Patient is a 62y old  Female who presents with a chief complaint of Shortness of breath (06 Sep 2023 10:53)      PAST MEDICAL & SURGICAL HISTORY:      Current Nutrition Order: DASH/TLC    PO Intake: Good (%) [ X ]  Fair (50-75%) [   ] Poor (<25%) [   ]    GI Issues: No N/V/D/C    Pain: No pain noted    Skin Integrity: Apolinar score 20; 1+ edema L/R foot    Labs:   09-05    132<L>  |  98  |  26<H>  ----------------------------<  164<H>  4.8   |  24  |  0.72    Ca    10.1      05 Sep 2023 07:40  Phos  3.5     09-05  Mg     2.0     09-05    TPro  7.3  /  Alb  3.5  /  TBili  0.6  /  DBili  x   /  AST  28  /  ALT  66<H>  /  AlkPhos  66  09-05    CAPILLARY BLOOD GLUCOSE      POCT Blood Glucose.: 174 mg/dL (06 Sep 2023 08:50)  POCT Blood Glucose.: 175 mg/dL (05 Sep 2023 22:32)  POCT Blood Glucose.: 144 mg/dL (05 Sep 2023 18:00)      Medications:  MEDICATIONS  (STANDING):  dextrose 5%. 1000 milliLiter(s) (50 mL/Hr) IV Continuous <Continuous>  dextrose 5%. 1000 milliLiter(s) (100 mL/Hr) IV Continuous <Continuous>  dextrose 50% Injectable 25 Gram(s) IV Push once  dextrose 50% Injectable 25 Gram(s) IV Push once  dextrose 50% Injectable 12.5 Gram(s) IV Push once  enoxaparin Injectable 40 milliGRAM(s) SubCutaneous every 12 hours  FIRST- Mouthwash  BLM 10 milliLiter(s) Swish and Spit every 4 hours  gabapentin 300 milliGRAM(s) Oral every 8 hours  glucagon  Injectable 1 milliGRAM(s) IntraMuscular once  insulin lispro (ADMELOG) corrective regimen sliding scale   SubCutaneous Before meals and at bedtime  metFORMIN 500 milliGRAM(s) Oral two times a day  methylPREDNISolone sodium succinate Injectable 40 milliGRAM(s) IV Push every 6 hours  pantoprazole    Tablet 40 milliGRAM(s) Oral before breakfast  trimethoprim  160 mG/sulfamethoxazole 800 mG 1 Tablet(s) Oral daily    MEDICATIONS  (PRN):  acetaminophen     Tablet .. 650 milliGRAM(s) Oral every 6 hours PRN Temp greater or equal to 38C (100.4F), Mild Pain (1 - 3)  benzocaine/menthol Lozenge 1 Lozenge Oral every 4 hours PRN Sore Throat  dextrose Oral Gel 15 Gram(s) Oral once PRN Blood Glucose LESS THAN 70 milliGRAM(s)/deciliter    Admission Anthropometrics:  Height for BMI (FEET)	5 Feet  Height for BMI (INCHES)	8 Inch(s)  Height for BMI (CENTIMETERS)	172.72 Centimeter(s)  Weight for BMI (lbs)	288.4 lb  Weight for BMI (kg)	130.8 kg  Body Mass Index	43.8      Estimated energy needs:   Estimated Energy Needs From (leo/kg)	25  Estimated Energy Needs To (leo/kg)	30  Estimated Energy Needs Calculated From (leo/kg)	1587  Estimated Energy Needs Calculated To (leo/kg)	1905    Estimated Protein Needs From (g/kg)	0.8  Estimated Protein Needs To (g/kg)	1  Estimated Protein Needs Calculated From (g/kg)	50.8  Estimated Protein Needs Calculated To (g/kg)	63.5    Other Calculations	Based on Standards of Care pt within % IBW (206%) thus IBW used for all calculations (140#). Fluid recs per team.    Subjective:   62 year old female with a past medical history of afib, and sciatica, who presented to [facility] for shortness of breath. Pulmonary consulted for acute hypoxic respiratory failure.     Pt assessed at bedside. Resting in bed on 2L NC. Continues on DASH/TLC diet. Tolerated well with good PO intake. Reviewed adequate intake parameters, avoiding excess intake. RD to follow.    Previous Nutrition Diagnosis:  Overweight/Obesity related to excessive energy intake as evidenced by BMI 44    Active [ X  ]  Resolved [   ]    Goal: Pt to recall and verbalize at least 3 nutrition education points    Recommendations:  1. Continue with diet order   2. Encourage pt to meet nutritional needs as able   3. Monitor labs: electrolytes, cmp  4. Monitor weights   5. Pain and bowel regimen per team   6. Will continue to assess/honor food preferences as able  7. Monitor adherence to diet education    Education: Reviewed general healthful diet, outpatient nutrition program    Risk Level: High [   ] Moderate [  X ] Low [   ]

## 2023-09-06 NOTE — PROGRESS NOTE ADULT - ASSESSMENT
62 year old female with a past medical history of afib, and sciatica, who presented to [facility] for shortness of breath found to have tropinemia and interstitial lung disease admitted for hypoxia. 62 year old female with a past medical history of afib, and sciatica, who presented to [facility] for shortness of breath found to hypoxic and have interstitial lung disease induced admitted for further ILD workup and management, respiratory status improving. 62 year old female with a past medical history of afib, and sciatica, who presented to [facility] for shortness of breath found to hypoxic and have interstitial lung disease induced admitted for AHRF, further ILD workup and management, respiratory status improving. 62 year old female with a past medical history of afib, and sciatica, who presented to [facility] for shortness of breath found to be hypoxic and have interstitial lung disease, admitted for AHRF, further ILD workup and management; respiratory status improving.

## 2023-09-06 NOTE — PROGRESS NOTE ADULT - ASSESSMENT
62 year old female with a past medical history of afib, and sciatica, who presented to [facility] for shortness of breath. Pulmonary consulted for acute hypoxic respiratory failure.     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

## 2023-09-06 NOTE — PROGRESS NOTE ADULT - PROBLEM SELECTOR PLAN 4
Trops downtrending. Troponin may be elevated in the setting of demand ischemia. Pt has history of chronic SOB. BNP wnl    - f/u cardiology rec  - continue to monitor symptoms F: none  E: If k>4 or mag>2 replete prn  N: dash diet  G: protonix  DVT: lovenox  Dispo; 7uris

## 2023-09-07 LAB
GLUCOSE BLDC GLUCOMTR-MCNC: 162 MG/DL — HIGH (ref 70–99)
GLUCOSE BLDC GLUCOMTR-MCNC: 178 MG/DL — HIGH (ref 70–99)
GLUCOSE BLDC GLUCOMTR-MCNC: 180 MG/DL — HIGH (ref 70–99)
GLUCOSE BLDC GLUCOMTR-MCNC: 220 MG/DL — HIGH (ref 70–99)

## 2023-09-07 PROCEDURE — 99232 SBSQ HOSP IP/OBS MODERATE 35: CPT | Mod: GC

## 2023-09-07 PROCEDURE — 99233 SBSQ HOSP IP/OBS HIGH 50: CPT | Mod: GC

## 2023-09-07 PROCEDURE — 71045 X-RAY EXAM CHEST 1 VIEW: CPT | Mod: 26

## 2023-09-07 RX ORDER — POLYETHYLENE GLYCOL 3350 17 G/17G
17 POWDER, FOR SOLUTION ORAL DAILY
Refills: 0 | Status: DISCONTINUED | OUTPATIENT
Start: 2023-09-07 | End: 2023-09-13

## 2023-09-07 RX ADMIN — METFORMIN HYDROCHLORIDE 500 MILLIGRAM(S): 850 TABLET ORAL at 17:47

## 2023-09-07 RX ADMIN — GABAPENTIN 300 MILLIGRAM(S): 400 CAPSULE ORAL at 22:45

## 2023-09-07 RX ADMIN — PANTOPRAZOLE SODIUM 40 MILLIGRAM(S): 20 TABLET, DELAYED RELEASE ORAL at 06:02

## 2023-09-07 RX ADMIN — METFORMIN HYDROCHLORIDE 500 MILLIGRAM(S): 850 TABLET ORAL at 05:40

## 2023-09-07 RX ADMIN — Medication 30 MILLIGRAM(S): at 11:13

## 2023-09-07 RX ADMIN — Medication 40 MILLIGRAM(S): at 05:40

## 2023-09-07 RX ADMIN — ENOXAPARIN SODIUM 40 MILLIGRAM(S): 100 INJECTION SUBCUTANEOUS at 05:40

## 2023-09-07 RX ADMIN — GABAPENTIN 300 MILLIGRAM(S): 400 CAPSULE ORAL at 13:12

## 2023-09-07 RX ADMIN — GABAPENTIN 300 MILLIGRAM(S): 400 CAPSULE ORAL at 05:40

## 2023-09-07 RX ADMIN — Medication 30 MILLIGRAM(S): at 17:47

## 2023-09-07 RX ADMIN — Medication 1 TABLET(S): at 11:13

## 2023-09-07 RX ADMIN — ENOXAPARIN SODIUM 40 MILLIGRAM(S): 100 INJECTION SUBCUTANEOUS at 17:47

## 2023-09-07 RX ADMIN — POLYETHYLENE GLYCOL 3350 17 GRAM(S): 17 POWDER, FOR SOLUTION ORAL at 09:59

## 2023-09-07 NOTE — PROGRESS NOTE ADULT - NUTRITIONAL ASSESSMENT
This patient has been assessed with a concern for Malnutrition and has been determined to have a diagnosis/diagnoses of Morbid obesity (BMI > 40).    This patient is being managed with:   Diet Regular-  Entered: Sep  7 2023 11:21AM

## 2023-09-07 NOTE — PROGRESS NOTE ADULT - ASSESSMENT
62 year old female with a past medical history of afib, and sciatica, who presented to [facility] for shortness of breath. Pulmonary consulted for acute hypoxic respiratory failure.     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

## 2023-09-07 NOTE — CONSULT NOTE ADULT - SUBJECTIVE AND OBJECTIVE BOX
HPI: 62F hx afib, now admitted to medicine for w/u acute onset SOB and interstitial lung disease. ENT consulted for L ear discomfort. Patient reports 2 days ago, she noticed a bubbling noise in her L ear and put her finger in her ear. She noted that her L ear canal felt more narrow than prior, and it was much narrower than the R. She worried this was an acute change. No drainage from L ear. No changes in hearing. No pain in L ear. Denies vertigo. Patient has a prior history of b/l tinnitus for which she's been worked up in [place], but reports no acute changes to this. Denies any current allergic symptoms (postnasal drip, nasal congestion).    Allergies    No Known Allergies    Intolerances      MEDICATIONS:  Antiinfectives:   trimethoprim  160 mG/sulfamethoxazole 800 mG 1 Tablet(s) Oral daily    Hematologic/Anticoagulation:  enoxaparin Injectable 40 milliGRAM(s) SubCutaneous every 12 hours    Pain medications/Neuro:  acetaminophen     Tablet .. 650 milliGRAM(s) Oral every 6 hours PRN  gabapentin 300 milliGRAM(s) Oral every 8 hours    IV fluids:  dextrose 5%. 1000 milliLiter(s) IV Continuous <Continuous>  dextrose 5%. 1000 milliLiter(s) IV Continuous <Continuous>    Endocrine Medications:   dextrose 50% Injectable 12.5 Gram(s) IV Push once  dextrose 50% Injectable 25 Gram(s) IV Push once  dextrose 50% Injectable 25 Gram(s) IV Push once  dextrose Oral Gel 15 Gram(s) Oral once PRN  glucagon  Injectable 1 milliGRAM(s) IntraMuscular once  insulin lispro (ADMELOG) corrective regimen sliding scale   SubCutaneous Before meals and at bedtime  metFORMIN 500 milliGRAM(s) Oral two times a day  methylPREDNISolone sodium succinate Injectable 30 milliGRAM(s) IV Push every 6 hours    All other standing medications:   FIRST- Mouthwash  BLM 10 milliLiter(s) Swish and Spit every 4 hours  pantoprazole    Tablet 40 milliGRAM(s) Oral before breakfast  polyethylene glycol 3350 17 Gram(s) Oral daily    All other PRN medications:  benzocaine/menthol Lozenge 1 Lozenge Oral every 4 hours PRN    Vital Signs Last 24 Hrs  T(C): 36.6 (07 Sep 2023 11:28), Max: 36.8 (06 Sep 2023 20:39)  T(F): 97.8 (07 Sep 2023 11:28), Max: 98.2 (06 Sep 2023 20:39)  HR: 79 (07 Sep 2023 11:28) (77 - 86)  BP: 143/85 (07 Sep 2023 11:28) (121/84 - 143/85)  BP(mean): --  RR: 16 (07 Sep 2023 11:28) (16 - 19)  SpO2: 95% (07 Sep 2023 11:28) (92% - 96%)    Parameters below as of 07 Sep 2023 11:28  Patient On (Oxygen Delivery Method): nasal cannula  O2 Flow (L/min): 2    LABS:  CBC-    Coagulation Studies-    Endocrine Panel-    PHYSICAL EXAM:  ENT EXAM-   Constitutional: No apparent distress. No hoarseness.     Head: normocephalic, atraumatic.   Ears: Ear canals both clear, no rashes or lesions. Tympanic membranes both intact; no effusion or retraction.   Nose: nares patent, septum intact, midline  OC/OP: Floor of mouth, buccal mucosa, lips, hard palate, soft palate, uvula, posterior pharyngeal wall normal. Mucosa moist.  Neck: Trachea midline. Thyroid, parotid and submandibular glands normal.  Lymph: No cervical adenopathy.    MULTISYSTEM EXAM-  Neuro/Psych: A&O x 3. Mood stable. Affect bright  Cranial nerves: 2-12 grossly intact bilaterally.  Eyes: EOMI, no nystagmus  Pulm: No dyspnea, non-labored breathing  Cardiovascular: regular rate   Skin: No rash or lesions on exposed skin of head/neck

## 2023-09-07 NOTE — PROGRESS NOTE ADULT - PROBLEM SELECTOR PLAN 3
STABLE. BL hand and arm numbness 2/2 to radiculopathy. Patient states that numbness in the BL UE has been increasing due to sleeping on hospital mattress. Home meds prednisone 20mg BID, gabapentin 100g TID. PT consult 8/29 needed no additional PT needs.     - f/u neuro recs  - c/w gabapentin 300 mg TID

## 2023-09-07 NOTE — PROGRESS NOTE ADULT - ASSESSMENT
62 year old female with a past medical history of afib, and sciatica, who presented to [facility] for shortness of breath found to be hypoxic and have interstitial lung disease, admitted for AHRF, further ILD workup and management; Respiratory status improving and medically stable.

## 2023-09-07 NOTE — PROGRESS NOTE ADULT - PROBLEM SELECTOR PLAN 1
STABLE. 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 Hb and Hct elevated in setting of chronic sob. CXR revealed ground glass opacity. CTA revealed interstitial lung disease.  Infectious workup negative so far. Hypersensitivity pneumonitis panel wnl. stable. Ambulatory sats have been stable around high 80s with quick recovery to baseline. s/p solu-medrol 60mg course. Tapering 40mg q6 9/6, 30mg q6 9/7    - c/w steroid tapering, reassess 9/8   - f/u rheum consult and workup  - continue to monitor sxs and obtain ambulatory sats.

## 2023-09-07 NOTE — PROGRESS NOTE ADULT - SUBJECTIVE AND OBJECTIVE BOX
PULMONARY CONSULT SERVICE FOLLOW-UP NOTE    INTERVAL HPI:  Reviewed chart and overnight events; patient seen and examined at bedside. She feels well, better compared to yesterday but she notes she is weak and it's hard for her to gauge since she doesn't get around and do much.     MEDICATIONS:  Pulmonary:    Antimicrobials:  trimethoprim  160 mG/sulfamethoxazole 800 mG 1 Tablet(s) Oral daily    Anticoagulants:  enoxaparin Injectable 40 milliGRAM(s) SubCutaneous every 12 hours    Cardiac:      Allergies    No Known Allergies    Intolerances        Vital Signs Last 24 Hrs  T(C): 36.8 (07 Sep 2023 05:51), Max: 36.8 (06 Sep 2023 20:39)  T(F): 98.2 (07 Sep 2023 05:51), Max: 98.2 (06 Sep 2023 20:39)  HR: 77 (07 Sep 2023 05:51) (77 - 86)  BP: 121/84 (07 Sep 2023 05:51) (121/84 - 138/93)  BP(mean): --  RR: 19 (07 Sep 2023 05:51) (18 - 19)  SpO2: 96% (07 Sep 2023 05:51) (92% - 96%)    Parameters below as of 07 Sep 2023 05:51  Patient On (Oxygen Delivery Method): nasal cannula  O2 Flow (L/min): 2          PHYSICAL EXAM:  Constitutional: WD  HEENT: NC/AT; PERRL, anicteric sclera; MMM  Neck: supple  Lymph: No supraclavical LAD or cervical chain LAD  Cardiovascular: +S1/S2, RRR  Respiratory: CTA B/L; no W/R/R  Gastrointestinal: soft, NT/ND  Extremities: WWP; no edema, clubbing or cyanosis  Vascular: 2+ radial and pedal pulses  Neurological: AAOx3; no focal deficits    LABS:    RADIOLOGY & ADDITIONAL STUDIES:    < from: Xray Chest 1 View- PORTABLE-Urgent (Xray Chest 1 View- PORTABLE-Urgent .) (09.07.23 @ 09:13) >  ACC: [id number] EXAM:  XR CHEST PORTABLE URGENT 1V   ORDERED BY: ALEX CARRASCO     PROCEDURE DATE:  09/07/2023          INTERPRETATION:  Portable chest    HISTORY: Abnormal breath sounds    IMPRESSION:    Increased hypoinflation compared to prior exam 8/30/2023. Bibasilar focal   atelectasis, lung base infiltrates cannot be excluded. Upper lungs   grossly clear. No pleural effusion or pneumothorax.    --- End of Report ---          < end of copied text >

## 2023-09-07 NOTE — PROGRESS NOTE ADULT - PROBLEM SELECTOR PLAN 2
ARIANA (1:1280 speckled), anti-smith (positive), complement levels (C3 normal, C4 low), anti RNP (elevated; >8)  SARS COV-2 spike protein antibody positive; CRP and ESR elevated    Hypersensitivity pneumonitis panel negative  Uploaded prior CT chest from 6/2023 that looks essentially normal.  TTE wnl    -Continue IV solumedrol but please taper to 40 mg IV q6 starting today, and then 30 mg IV q6 tomorrow, followed by 20 mg IV q6 on Friday  -If she continues to improve we will plan to discharge on Saturday on PO methylprednisolone 20 mg qd   -IV Ceftriaxone 5 days total out of an abundance of precaution to cover BAL specimen  -PT evaluation and document ambulatory saturation if possible; will likely require establishment of home oxygen prior to discharge  -Patient walked this morning and ambulatory saturation after going up and down the [place] was 90% on 2L  -Repeat CXR this AM largely unchanged  -Nutrition counseling and weight loss  -Will require dedicated outpatient pulmonary follow up with Dr. Whitney in 2 weeks from discharge with formal PFTs and a 6MWT.  -Repeat CT chest in 6 weeks.  -Will need to walk with suppl O2 prior to discharge; will call 48 hours after discharge to see if the patient is okay.

## 2023-09-07 NOTE — PROGRESS NOTE ADULT - SUBJECTIVE AND OBJECTIVE BOX
OVERNIGHT EVENTS: NAEO    SUBJECTIVE  Pt was seen and examined at bedside. Pt appears comfortable and not in any acute distress. Reports MANZANO but no SOB at rest.  Also reports left side ear discomfort. Denies tinnitus, vertigo, dizziness.    Also denies chest pain, palpitation. ROS negative other than above.         PHYSICAL EXAM     General: NAD  HEENT: NC/AT; PERRL; Neck Supple; no JVD  Respiratory: CTAB; no wheezes/rales/rhonchi  Cardiovascular: Regular rhythm/rate, + S1/S2; no murmurs, rubs gallops   Gastrointestinal: Soft; NTND; bowel sounds normal and present  Extremities: WWP; no edema/cyanosis  Neurological: A&Ox3, CNII-XII grossly intact; no obvious focal deficits  Integument: intact; normal turgor, texture, temperature, color for age    Vitals:  ============  T(C): 36.6 (09-07-23 @ 11:28), Max: 36.8 (09-06-23 @ 20:39)  HR: 79 (09-07-23 @ 11:28) (77 - 86)  BP: 143/85 (09-07-23 @ 11:28) (121/84 - 143/85)  RR: 16 (09-07-23 @ 11:28) (16 - 19)  SpO2: 95% (09-07-23 @ 11:28) (92% - 96%)        =======================================================  Current Antibiotics:  trimethoprim  160 mG/sulfamethoxazole 800 mG 1 Tablet(s) Oral daily    Other medications:  dextrose 5%. 1000 milliLiter(s) IV Continuous <Continuous>  dextrose 5%. 1000 milliLiter(s) IV Continuous <Continuous>  dextrose 50% Injectable 25 Gram(s) IV Push once  dextrose 50% Injectable 25 Gram(s) IV Push once  dextrose 50% Injectable 12.5 Gram(s) IV Push once  enoxaparin Injectable 40 milliGRAM(s) SubCutaneous every 12 hours  FIRST- Mouthwash  BLM 10 milliLiter(s) Swish and Spit every 4 hours  gabapentin 300 milliGRAM(s) Oral every 8 hours  glucagon  Injectable 1 milliGRAM(s) IntraMuscular once  insulin lispro (ADMELOG) corrective regimen sliding scale   SubCutaneous Before meals and at bedtime  metFORMIN 500 milliGRAM(s) Oral two times a day  methylPREDNISolone sodium succinate Injectable 30 milliGRAM(s) IV Push every 6 hours  pantoprazole    Tablet 40 milliGRAM(s) Oral before breakfast  polyethylene glycol 3350 17 Gram(s) Oral daily      =======================================================  Labs:            Culture - Fungal, Bronchial (collected 08-30-23 @ 12:54)  Source: .Bronchial RLL BAL    Culture - Bronchial (collected 08-30-23 @ 12:54)  Source: Lavage RLL BAL  Gram Stain (08-30-23 @ 16:26):    Rare epithelial cells    Rare WBC's    Few Gram Positive Rods    Few Gram Positive Cocci in Pairs and Chains  Final Report (09-01-23 @ 14:22):    Normal Respiratory Noreen present including    7,000 CFU/ml Haemophilus parainfluenzae       OVERNIGHT EVENTS: NAEO    SUBJECTIVE  Pt was seen and examined at bedside. Pt appears comfortable and not in any acute distress. Reports "feeling better" but continues to MANZANO w/o SOB at rest.  Also reports left side ear discomfort. Denies tinnitus, vertigo, dizziness.    Also denies chest pain, palpitation. ROS negative other than above.         PHYSICAL EXAM     General: NAD  HEENT: NC/AT; PERRL; Neck Supple; no JVD. No erythemia, edema at b/l external ears   Respiratory: CTAB; no wheezes/rales/rhonchi  Cardiovascular: Regular rhythm/rate, + S1/S2; no murmurs, rubs gallops   Gastrointestinal: Soft; NTND; bowel sounds normal and present  Extremities: WWP; no edema/cyanosis,  2+ pulses b/l at all extremities   Neurological: A&Ox3,  no focal deficits  Integument: intact; normal turgor, texture, temperature, color for age    Vitals:  ============  T(C): 36.6 (09-07-23 @ 11:28), Max: 36.8 (09-06-23 @ 20:39)  HR: 79 (09-07-23 @ 11:28) (77 - 86)  BP: 143/85 (09-07-23 @ 11:28) (121/84 - 143/85)  RR: 16 (09-07-23 @ 11:28) (16 - 19)  SpO2: 95% (09-07-23 @ 11:28) (92% - 96%)        =======================================================  Current Antibiotics:  trimethoprim  160 mG/sulfamethoxazole 800 mG 1 Tablet(s) Oral daily    Other medications:  dextrose 5%. 1000 milliLiter(s) IV Continuous <Continuous>  dextrose 5%. 1000 milliLiter(s) IV Continuous <Continuous>  dextrose 50% Injectable 25 Gram(s) IV Push once  dextrose 50% Injectable 25 Gram(s) IV Push once  dextrose 50% Injectable 12.5 Gram(s) IV Push once  enoxaparin Injectable 40 milliGRAM(s) SubCutaneous every 12 hours  FIRST- Mouthwash  BLM 10 milliLiter(s) Swish and Spit every 4 hours  gabapentin 300 milliGRAM(s) Oral every 8 hours  glucagon  Injectable 1 milliGRAM(s) IntraMuscular once  insulin lispro (ADMELOG) corrective regimen sliding scale   SubCutaneous Before meals and at bedtime  metFORMIN 500 milliGRAM(s) Oral two times a day  methylPREDNISolone sodium succinate Injectable 30 milliGRAM(s) IV Push every 6 hours  pantoprazole    Tablet 40 milliGRAM(s) Oral before breakfast  polyethylene glycol 3350 17 Gram(s) Oral daily      =======================================================

## 2023-09-07 NOTE — PROGRESS NOTE ADULT - ATTENDING COMMENTS
Patient was seen and examined at bedside on 9/7/2023 at 1130 am. Patient reports that she feels mildly improved. Denies SOB, CP, dizziness, abdominal pain, N/V. ROS is otherwise negative. Vitals, labwork and pertinent imaging reviewed. Physical exam - NAD, AAO x 4, PERRLA, EOMI, supple neck, chest - CTA b/l, CV - rrr, s1s2, no m/r/g, abd - soft, + BS, ext - WWP, psych - normal affect, skin - no rash, back - midline    Plan  -C/w high dose Steroids - noiw tapered Solumedrol 30 mg IV q6, Protonix 40 mg PO qd, plan to taper and switch to PO on 9/9  -S/p 5 day course of CTX given + cultures (H influenza)  -Continue to monitor oxygen requirements - improving  -C/w Metformin given increased FS - fairly well controlled; will try to obtain CGM for patient

## 2023-09-07 NOTE — PROGRESS NOTE ADULT - PROBLEM SELECTOR PLAN 2
STABLE and currently in sinus rhythm. Pt states she had afib 1 month ago. Has not been taking medications.     - Lovenox AC   - consider lopressor 12.5 if afib

## 2023-09-07 NOTE — CONSULT NOTE ADULT - ASSESSMENT
62F hx afib, now admitted to medicine for w/u acute onset SOB and interstitial lung disease. ENT consulted for L ear discomfort. Normal ear exam, no rashes or lesions on canal, TM non-retracted, non-erythematous.    Plan:  - No acute ENT intervention at this time  - If symptoms worsen or persist, patient can call [phone] to schedule an outpatient f/u appointment

## 2023-09-08 ENCOUNTER — TRANSCRIPTION ENCOUNTER (OUTPATIENT)
Age: 63
End: 2023-09-08

## 2023-09-08 LAB
ALBUMIN SERPL ELPH-MCNC: 3.2 G/DL — LOW (ref 3.3–5)
ALP SERPL-CCNC: 66 U/L — SIGNIFICANT CHANGE UP (ref 40–120)
ALT FLD-CCNC: 79 U/L — HIGH (ref 10–45)
ANION GAP SERPL CALC-SCNC: 11 MMOL/L — SIGNIFICANT CHANGE UP (ref 5–17)
AST SERPL-CCNC: 30 U/L — SIGNIFICANT CHANGE UP (ref 10–40)
BASOPHILS # BLD AUTO: 0.03 K/UL — SIGNIFICANT CHANGE UP (ref 0–0.2)
BASOPHILS NFR BLD AUTO: 0.3 % — SIGNIFICANT CHANGE UP (ref 0–2)
BILIRUB SERPL-MCNC: 0.8 MG/DL — SIGNIFICANT CHANGE UP (ref 0.2–1.2)
BUN SERPL-MCNC: 36 MG/DL — HIGH (ref 7–23)
CALCIUM SERPL-MCNC: 9.7 MG/DL — SIGNIFICANT CHANGE UP (ref 8.4–10.5)
CHLORIDE SERPL-SCNC: 97 MMOL/L — SIGNIFICANT CHANGE UP (ref 96–108)
CO2 SERPL-SCNC: 22 MMOL/L — SIGNIFICANT CHANGE UP (ref 22–31)
CREAT SERPL-MCNC: 0.76 MG/DL — SIGNIFICANT CHANGE UP (ref 0.5–1.3)
EGFR: 87 ML/MIN/1.73M2 — SIGNIFICANT CHANGE UP
EOSINOPHIL # BLD AUTO: 0.02 K/UL — SIGNIFICANT CHANGE UP (ref 0–0.5)
EOSINOPHIL NFR BLD AUTO: 0.2 % — SIGNIFICANT CHANGE UP (ref 0–6)
GLUCOSE BLDC GLUCOMTR-MCNC: 172 MG/DL — HIGH (ref 70–99)
GLUCOSE BLDC GLUCOMTR-MCNC: 187 MG/DL — HIGH (ref 70–99)
GLUCOSE BLDC GLUCOMTR-MCNC: 210 MG/DL — HIGH (ref 70–99)
GLUCOSE BLDC GLUCOMTR-MCNC: 220 MG/DL — HIGH (ref 70–99)
GLUCOSE SERPL-MCNC: 209 MG/DL — HIGH (ref 70–99)
HCT VFR BLD CALC: 49.8 % — HIGH (ref 34.5–45)
HGB BLD-MCNC: 16.3 G/DL — HIGH (ref 11.5–15.5)
IMM GRANULOCYTES NFR BLD AUTO: 1.6 % — HIGH (ref 0–0.9)
LYMPHOCYTES # BLD AUTO: 0.66 K/UL — LOW (ref 1–3.3)
LYMPHOCYTES # BLD AUTO: 5.7 % — LOW (ref 13–44)
MAGNESIUM SERPL-MCNC: 2.1 MG/DL — SIGNIFICANT CHANGE UP (ref 1.6–2.6)
MCHC RBC-ENTMCNC: 30.4 PG — SIGNIFICANT CHANGE UP (ref 27–34)
MCHC RBC-ENTMCNC: 32.7 GM/DL — SIGNIFICANT CHANGE UP (ref 32–36)
MCV RBC AUTO: 92.9 FL — SIGNIFICANT CHANGE UP (ref 80–100)
MONOCYTES # BLD AUTO: 0.39 K/UL — SIGNIFICANT CHANGE UP (ref 0–0.9)
MONOCYTES NFR BLD AUTO: 3.3 % — SIGNIFICANT CHANGE UP (ref 2–14)
NEUTROPHILS # BLD AUTO: 10.38 K/UL — HIGH (ref 1.8–7.4)
NEUTROPHILS NFR BLD AUTO: 88.9 % — HIGH (ref 43–77)
NRBC # BLD: 0 /100 WBCS — SIGNIFICANT CHANGE UP (ref 0–0)
OSMOLALITY UR: 848 MOSM/KG — SIGNIFICANT CHANGE UP (ref 300–900)
PHOSPHATE SERPL-MCNC: 3.2 MG/DL — SIGNIFICANT CHANGE UP (ref 2.5–4.5)
PLATELET # BLD AUTO: 119 K/UL — LOW (ref 150–400)
POTASSIUM SERPL-MCNC: 4.6 MMOL/L — SIGNIFICANT CHANGE UP (ref 3.5–5.3)
POTASSIUM SERPL-SCNC: 4.6 MMOL/L — SIGNIFICANT CHANGE UP (ref 3.5–5.3)
PROT SERPL-MCNC: 6.7 G/DL — SIGNIFICANT CHANGE UP (ref 6–8.3)
RBC # BLD: 5.36 M/UL — HIGH (ref 3.8–5.2)
RBC # FLD: 14.4 % — SIGNIFICANT CHANGE UP (ref 10.3–14.5)
SODIUM SERPL-SCNC: 130 MMOL/L — LOW (ref 135–145)
SODIUM UR-SCNC: 75 MMOL/L — SIGNIFICANT CHANGE UP
TM INTERPRETATION: SIGNIFICANT CHANGE UP
WBC # BLD: 11.67 K/UL — HIGH (ref 3.8–10.5)
WBC # FLD AUTO: 11.67 K/UL — HIGH (ref 3.8–10.5)

## 2023-09-08 PROCEDURE — 99232 SBSQ HOSP IP/OBS MODERATE 35: CPT | Mod: GC

## 2023-09-08 RX ORDER — MYCOPHENOLATE MOFETIL 250 MG/1
500 CAPSULE ORAL ONCE
Refills: 0 | Status: COMPLETED | OUTPATIENT
Start: 2023-09-08 | End: 2023-09-08

## 2023-09-08 RX ADMIN — Medication 20 MILLIGRAM(S): at 12:17

## 2023-09-08 RX ADMIN — METFORMIN HYDROCHLORIDE 500 MILLIGRAM(S): 850 TABLET ORAL at 18:32

## 2023-09-08 RX ADMIN — Medication 20 MILLIGRAM(S): at 18:32

## 2023-09-08 RX ADMIN — GABAPENTIN 300 MILLIGRAM(S): 400 CAPSULE ORAL at 22:12

## 2023-09-08 RX ADMIN — ENOXAPARIN SODIUM 40 MILLIGRAM(S): 100 INJECTION SUBCUTANEOUS at 18:32

## 2023-09-08 RX ADMIN — ENOXAPARIN SODIUM 40 MILLIGRAM(S): 100 INJECTION SUBCUTANEOUS at 06:54

## 2023-09-08 RX ADMIN — METFORMIN HYDROCHLORIDE 500 MILLIGRAM(S): 850 TABLET ORAL at 06:54

## 2023-09-08 RX ADMIN — GABAPENTIN 300 MILLIGRAM(S): 400 CAPSULE ORAL at 06:55

## 2023-09-08 RX ADMIN — PANTOPRAZOLE SODIUM 40 MILLIGRAM(S): 20 TABLET, DELAYED RELEASE ORAL at 06:54

## 2023-09-08 RX ADMIN — Medication 20 MILLIGRAM(S): at 07:20

## 2023-09-08 RX ADMIN — POLYETHYLENE GLYCOL 3350 17 GRAM(S): 17 POWDER, FOR SOLUTION ORAL at 12:17

## 2023-09-08 RX ADMIN — GABAPENTIN 300 MILLIGRAM(S): 400 CAPSULE ORAL at 13:01

## 2023-09-08 RX ADMIN — Medication 1 TABLET(S): at 12:17

## 2023-09-08 RX ADMIN — MYCOPHENOLATE MOFETIL 500 MILLIGRAM(S): 250 CAPSULE ORAL at 22:12

## 2023-09-08 RX ADMIN — Medication 30 MILLIGRAM(S): at 00:37

## 2023-09-08 NOTE — PROGRESS NOTE ADULT - PROBLEM SELECTOR PLAN 2
ARIANA (1:1280 speckled), anti-smith (positive), complement levels (C3 normal, C4 low), anti RNP (elevated; >8)  SARS COV-2 spike protein antibody positive; CRP and ESR elevated    Hypersensitivity pneumonitis panel negative  Uploaded prior CT chest from 6/2023 that looks essentially normal.  TTE wnl    -Continue IV solumedrol but please taper to 40 mg IV q6 starting today, and then 30 mg IV q6 tomorrow, followed by 20 mg IV q6 on Friday, and the transition to methylprednisolone 32 mg PO qd starting Saturday  -Please start Cellcept 500 mg PO this evening. If she tolerates it well, then we can go up to Cellcept 500 mg BID starting tomorrow  -If she continues to improve we will plan to discharge on Sunday on PO methylprednisolone 32mg PO qd  -IV Ceftriaxone 5 days total out of an abundance of precaution to cover BAL specimen  -PT evaluation and document ambulatory saturation if possible; will likely require establishment of home oxygen prior to discharge  -Patient walked yesterday morning and ambulatory saturation after going up and down the [place] was 90% on 2L  -Repeat CXR this AM largely unchanged  -Nutrition counseling and weight loss  -Will require dedicated outpatient pulmonary follow up with Dr. Whitney in 2 weeks from discharge with formal PFTs and a 6MWT.  -Repeat CT chest in 6 weeks.  -Will need to walk with suppl O2 prior to discharge; will call 48 hours after discharge to see if the patient is okay. ARIANA (1:1280 speckled), anti-smith (positive), complement levels (C3 normal, C4 low), anti RNP (elevated; >8)  SARS COV-2 spike protein antibody positive; CRP and ESR elevated    Hypersensitivity pneumonitis panel negative  Uploaded prior CT chest from 6/2023 that looks essentially normal.  TTE wnl    -Continue IV solumedrol but please taper to 40 mg IV q6 starting today, and then 30 mg IV q6 tomorrow, followed by 20 mg IV q6 on Friday, and the transition to methylprednisolone 32 mg PO qd starting Saturday  -Please start Cellcept 500 mg PO this evening. If she tolerates it well, then we can go up to Cellcept 500 mg BID starting tomorrow  -If she continues to improve we will plan to discharge on Sunday on PO methylprednisolone 32mg PO qd and Cellcept 500 mg PO BID  -IV Ceftriaxone 5 days total out of an abundance of precaution to cover BAL specimen  -PT evaluation and document ambulatory saturation if possible; will likely require establishment of home oxygen prior to discharge  -Patient walked yesterday morning and ambulatory saturation after going up and down the [place] was 90% on 2L  -Nutrition counseling and weight loss  -Will require dedicated outpatient pulmonary follow up with Dr. Whitney in 2 weeks from discharge with formal PFTs and a 6MWT.  -Repeat CT chest in 6 weeks.  -Will need to walk with suppl O2 prior to discharge

## 2023-09-08 NOTE — PROGRESS NOTE ADULT - PROBLEM SELECTOR PLAN 1
STABLE. 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 Hb and Hct elevated in setting of chronic sob. CXR revealed ground glass opacity. CTA revealed interstitial lung disease.  Infectious workup negative so far. Hypersensitivity pneumonitis panel wnl. stable. Ambulatory sats have been stable around high 80s/ low 90s with quick recovery to baseline. s/p solu-medrol 60mg course & tapering 40mg q6 9/6, 30mg q6 9/7, 20mg 9/8    - transition to PO methylprednisone 20mg qd  - start cellcept   - f/u rheum consult and workup  - continue to monitor sxs and obtain ambulatory sats. STABLE. 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 Hb and Hct elevated in setting of chronic sob. CXR revealed ground glass opacity. CTA revealed interstitial lung disease.  Infectious workup negative so far. Hypersensitivity pneumonitis panel wnl. stable. Ambulatory sats have been stable around high 80s/ low 90s with quick recovery to baseline. s/p solu-medrol 60mg course & tapering 40mg q6 9/6, 30mg q6 9/7, 20mg 9/8, transition to PO methylprednisone 20mg qd after     - continue steroid tapering course   - start cellcept   - f/u rheum consult and workup  - continue to monitor sxs and obtain ambulatory sats. STABLE. 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 Hb and Hct elevated in setting of chronic sob. CXR revealed ground glass opacity. CTA revealed interstitial lung disease.  Infectious workup negative so far. Hypersensitivity pneumonitis panel wnl. stable. Ambulatory sats have been stable around high 80s/ low 90s with quick recovery to baseline. s/p solu-medrol 60mg course & tapering 40mg q6 9/6, 30mg q6 9/7, 20mg 9/8, transition to PO methylprednisone 20mg qd after     - f/u 12pm lab  - continue steroid tapering course   - start cellcept   - f/u rheum consult and workup  - continue to monitor sxs and obtain ambulatory sats.

## 2023-09-08 NOTE — PROGRESS NOTE ADULT - ASSESSMENT
62 year old female with a past medical history of afib, and sciatica, who presented to [facility] for shortness of breath. Pulmonary consulted for acute hypoxic respiratory failure.     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

## 2023-09-08 NOTE — PROGRESS NOTE ADULT - ASSESSMENT
62 year old female with a past medical history of afib, and sciatica, who presented to [facility] for shortness of breath found to be hypoxic and have interstitial lung disease, admitted for AHRF, further ILD workup and management; Respiratory status improving and medically stable.

## 2023-09-08 NOTE — PROGRESS NOTE ADULT - SUBJECTIVE AND OBJECTIVE BOX
OVERNIGHT EVENTS: NAEO    SUBJECTIVE  pt was seen and examined at bedside. Stated subjective symptomatic improvement.  Continues to report MANZANO w/o SOB at rest.     denies chest pain, palpitation. ROS negative other than above.         T(C): 36.5 (09-08-23 @ 06:59), Max: 37.1 (09-07-23 @ 20:42)  HR: 85 (09-08-23 @ 06:59) (79 - 89)  BP: 119/77 (09-08-23 @ 06:59) (119/77 - 143/85)  RR: 18 (09-08-23 @ 06:59) (16 - 18)  SpO2: 95% (09-08-23 @ 06:59) (94% - 95%)    PHYSICAL EXAM   General: NAD  HEENT: NC/AT; PERRL; Neck Supple; no JVD.   Respiratory: faint basilar crackle b/l   Cardiovascular: Regular rhythm/rate, + S1/S2; no murmurs, rubs gallops   Gastrointestinal: Soft; NTND; bowel sounds normal and present  Extremities: WWP; no edema/cyanosis,  2+ pulses b/l at all extremities   Neurological: A&Ox3,  no focal deficits  Integument: intact; normal turgor, texture, temperature, color for age    Vitals:  ============  T(C): 36.5 (09-08-23 @ 06:59), Max: 37.1 (09-07-23 @ 20:42)  HR: 85 (09-08-23 @ 06:59) (79 - 89)  BP: 119/77 (09-08-23 @ 06:59) (119/77 - 143/85)  RR: 18 (09-08-23 @ 06:59) (16 - 18)  SpO2: 95% (09-08-23 @ 06:59) (94% - 95%)        =======================================================  Current Antibiotics:  trimethoprim  160 mG/sulfamethoxazole 800 mG 1 Tablet(s) Oral daily    Other medications:  dextrose 5%. 1000 milliLiter(s) IV Continuous <Continuous>  dextrose 5%. 1000 milliLiter(s) IV Continuous <Continuous>  dextrose 50% Injectable 25 Gram(s) IV Push once  dextrose 50% Injectable 12.5 Gram(s) IV Push once  dextrose 50% Injectable 25 Gram(s) IV Push once  enoxaparin Injectable 40 milliGRAM(s) SubCutaneous every 12 hours  gabapentin 300 milliGRAM(s) Oral every 8 hours  glucagon  Injectable 1 milliGRAM(s) IntraMuscular once  insulin lispro (ADMELOG) corrective regimen sliding scale   SubCutaneous Before meals and at bedtime  metFORMIN 500 milliGRAM(s) Oral two times a day  methylPREDNISolone sodium succinate Injectable 20 milliGRAM(s) IV Push every 6 hours  mycophenolate mofetil 500 milliGRAM(s) Oral once  pantoprazole    Tablet 40 milliGRAM(s) Oral before breakfast  polyethylene glycol 3350 17 Gram(s) Oral daily      =======================================================  Labs:            Culture - Fungal, Bronchial (collected 08-30-23 @ 12:54)  Source: .Bronchial RLL BAL    Culture - Bronchial (collected 08-30-23 @ 12:54)  Source: Lavage RLL BAL  Gram Stain (08-30-23 @ 16:26):    Rare epithelial cells    Rare WBC's    Few Gram Positive Rods    Few Gram Positive Cocci in Pairs and Chains  Final Report (09-01-23 @ 14:22):    Normal Respiratory Noreen present including    7,000 CFU/ml Haemophilus parainfluenzae

## 2023-09-08 NOTE — PROGRESS NOTE ADULT - ATTENDING COMMENTS
Patient was seen and examined at bedside on 9/8/2023 at 1130 am. Patient reports that she feels mildly improved especially at rest although her symptoms worsen with ambulation. Denies SOB at rest, CP, dizziness, abdominal pain, N/V. ROS is otherwise negative. Vitals, labwork and pertinent imaging reviewed. Physical exam - NAD, AAO x 4, PERRLA, EOMI, supple neck, chest - CTA b/l, CV - rrr, s1s2, no m/r/g, abd - soft, + BS, ext - WWP, psych - normal affect, skin - no rash, back - midline    Plan  -C/w high dose Steroid taper, Protonix 40 mg PO qd, plan to taper and switch to PO on 9/9  -Will start Cellcept today  -S/p 5 day course of CTX given + cultures (H influenza)  -Continue to monitor oxygen requirements - improving  -C/w Metformin given increased FS - fairly well controlled; will try to obtain CGM for patient  -Hyponatremia suspect SIADH, check urine lytes

## 2023-09-09 DIAGNOSIS — R53.1 WEAKNESS: ICD-10-CM

## 2023-09-09 LAB
ALBUMIN SERPL ELPH-MCNC: 3.3 G/DL — SIGNIFICANT CHANGE UP (ref 3.3–5)
ALP SERPL-CCNC: 72 U/L — SIGNIFICANT CHANGE UP (ref 40–120)
ALT FLD-CCNC: 87 U/L — HIGH (ref 10–45)
ANION GAP SERPL CALC-SCNC: 13 MMOL/L — SIGNIFICANT CHANGE UP (ref 5–17)
AST SERPL-CCNC: 37 U/L — SIGNIFICANT CHANGE UP (ref 10–40)
BASOPHILS # BLD AUTO: 0.03 K/UL — SIGNIFICANT CHANGE UP (ref 0–0.2)
BASOPHILS NFR BLD AUTO: 0.2 % — SIGNIFICANT CHANGE UP (ref 0–2)
BILIRUB SERPL-MCNC: 0.7 MG/DL — SIGNIFICANT CHANGE UP (ref 0.2–1.2)
BUN SERPL-MCNC: 34 MG/DL — HIGH (ref 7–23)
CALCIUM SERPL-MCNC: 9.6 MG/DL — SIGNIFICANT CHANGE UP (ref 8.4–10.5)
CHLORIDE SERPL-SCNC: 95 MMOL/L — LOW (ref 96–108)
CO2 SERPL-SCNC: 23 MMOL/L — SIGNIFICANT CHANGE UP (ref 22–31)
CREAT SERPL-MCNC: 0.78 MG/DL — SIGNIFICANT CHANGE UP (ref 0.5–1.3)
EGFR: 85 ML/MIN/1.73M2 — SIGNIFICANT CHANGE UP
EOSINOPHIL # BLD AUTO: 0.02 K/UL — SIGNIFICANT CHANGE UP (ref 0–0.5)
EOSINOPHIL NFR BLD AUTO: 0.2 % — SIGNIFICANT CHANGE UP (ref 0–6)
GLUCOSE BLDC GLUCOMTR-MCNC: 202 MG/DL — HIGH (ref 70–99)
GLUCOSE SERPL-MCNC: 189 MG/DL — HIGH (ref 70–99)
HCT VFR BLD CALC: 52.4 % — HIGH (ref 34.5–45)
HGB BLD-MCNC: 17.1 G/DL — HIGH (ref 11.5–15.5)
IMM GRANULOCYTES NFR BLD AUTO: 1.3 % — HIGH (ref 0–0.9)
LYMPHOCYTES # BLD AUTO: 0.57 K/UL — LOW (ref 1–3.3)
LYMPHOCYTES # BLD AUTO: 4.3 % — LOW (ref 13–44)
MAGNESIUM SERPL-MCNC: 2.2 MG/DL — SIGNIFICANT CHANGE UP (ref 1.6–2.6)
MCHC RBC-ENTMCNC: 31 PG — SIGNIFICANT CHANGE UP (ref 27–34)
MCHC RBC-ENTMCNC: 32.6 GM/DL — SIGNIFICANT CHANGE UP (ref 32–36)
MCV RBC AUTO: 94.9 FL — SIGNIFICANT CHANGE UP (ref 80–100)
MONOCYTES # BLD AUTO: 0.44 K/UL — SIGNIFICANT CHANGE UP (ref 0–0.9)
MONOCYTES NFR BLD AUTO: 3.4 % — SIGNIFICANT CHANGE UP (ref 2–14)
NEUTROPHILS # BLD AUTO: 11.9 K/UL — HIGH (ref 1.8–7.4)
NEUTROPHILS NFR BLD AUTO: 90.6 % — HIGH (ref 43–77)
NRBC # BLD: 0 /100 WBCS — SIGNIFICANT CHANGE UP (ref 0–0)
PHOSPHATE SERPL-MCNC: 4 MG/DL — SIGNIFICANT CHANGE UP (ref 2.5–4.5)
PLATELET # BLD AUTO: 96 K/UL — LOW (ref 150–400)
POTASSIUM SERPL-MCNC: 5.2 MMOL/L — SIGNIFICANT CHANGE UP (ref 3.5–5.3)
POTASSIUM SERPL-SCNC: 5.2 MMOL/L — SIGNIFICANT CHANGE UP (ref 3.5–5.3)
PROT SERPL-MCNC: 6.4 G/DL — SIGNIFICANT CHANGE UP (ref 6–8.3)
RBC # BLD: 5.52 M/UL — HIGH (ref 3.8–5.2)
RBC # FLD: 14.4 % — SIGNIFICANT CHANGE UP (ref 10.3–14.5)
SODIUM SERPL-SCNC: 131 MMOL/L — LOW (ref 135–145)
WBC # BLD: 13.13 K/UL — HIGH (ref 3.8–10.5)
WBC # FLD AUTO: 13.13 K/UL — HIGH (ref 3.8–10.5)

## 2023-09-09 PROCEDURE — 99232 SBSQ HOSP IP/OBS MODERATE 35: CPT | Mod: GC

## 2023-09-09 RX ORDER — MYCOPHENOLATE MOFETIL 250 MG/1
2 CAPSULE ORAL
Qty: 28 | Refills: 2
Start: 2023-09-09 | End: 2023-10-20

## 2023-09-09 RX ORDER — MYCOPHENOLATE MOFETIL 250 MG/1
500 CAPSULE ORAL EVERY 12 HOURS
Refills: 0 | Status: DISCONTINUED | OUTPATIENT
Start: 2023-09-09 | End: 2023-09-11

## 2023-09-09 RX ADMIN — METFORMIN HYDROCHLORIDE 500 MILLIGRAM(S): 850 TABLET ORAL at 18:06

## 2023-09-09 RX ADMIN — METFORMIN HYDROCHLORIDE 500 MILLIGRAM(S): 850 TABLET ORAL at 06:52

## 2023-09-09 RX ADMIN — Medication 1 TABLET(S): at 11:21

## 2023-09-09 RX ADMIN — Medication 32 MILLIGRAM(S): at 06:53

## 2023-09-09 RX ADMIN — MYCOPHENOLATE MOFETIL 500 MILLIGRAM(S): 250 CAPSULE ORAL at 22:35

## 2023-09-09 RX ADMIN — Medication 20 MILLIGRAM(S): at 00:47

## 2023-09-09 RX ADMIN — GABAPENTIN 300 MILLIGRAM(S): 400 CAPSULE ORAL at 22:35

## 2023-09-09 RX ADMIN — GABAPENTIN 300 MILLIGRAM(S): 400 CAPSULE ORAL at 14:11

## 2023-09-09 RX ADMIN — GABAPENTIN 300 MILLIGRAM(S): 400 CAPSULE ORAL at 06:52

## 2023-09-09 RX ADMIN — ENOXAPARIN SODIUM 40 MILLIGRAM(S): 100 INJECTION SUBCUTANEOUS at 06:52

## 2023-09-09 RX ADMIN — ENOXAPARIN SODIUM 40 MILLIGRAM(S): 100 INJECTION SUBCUTANEOUS at 18:06

## 2023-09-09 RX ADMIN — MYCOPHENOLATE MOFETIL 500 MILLIGRAM(S): 250 CAPSULE ORAL at 08:39

## 2023-09-09 RX ADMIN — PANTOPRAZOLE SODIUM 40 MILLIGRAM(S): 20 TABLET, DELAYED RELEASE ORAL at 06:53

## 2023-09-09 NOTE — PROGRESS NOTE ADULT - SUBJECTIVE AND OBJECTIVE BOX
OVERNIGHT EVENTS: JEN MAYA  pt was seen and examined at bedside. Patient reports that she is now experiencing increased weakness in her upper extremity. Has pain with simple ADLs    denies chest pain, palpitation. ROS negative other than above.             PHYSICAL EXAM   General: NAD  HEENT: NC/AT; PERRL; Neck Supple; no JVD.   Respiratory: faint basilar crackle b/l   Cardiovascular: Regular rhythm/rate, + S1/S2; no murmurs, rubs gallops   Gastrointestinal: Soft; NTND; bowel sounds normal and present  Extremities: WWP; no edema/cyanosis,  2+ pulses b/l at all extremities   Neurological: A&Ox3,  no focal deficits  Integument: intact; normal turgor, texture, temperature, color for age  Psych: normal affect  Skin: no rash      Vital Signs Last 24 Hrs  T(C): 36.6 (09 Sep 2023 06:24), Max: 37.3 (08 Sep 2023 20:59)  T(F): 97.8 (09 Sep 2023 06:24), Max: 99.1 (08 Sep 2023 20:59)  HR: 78 (09 Sep 2023 06:24) (75 - 84)  BP: 131/85 (09 Sep 2023 06:24) (127/77 - 131/85)  BP(mean): 99 (08 Sep 2023 20:59) (99 - 99)  RR: 18 (09 Sep 2023 06:24) (18 - 18)  SpO2: 96% (09 Sep 2023 06:24) (92% - 96%)    Parameters below as of 09 Sep 2023 06:24  Patient On (Oxygen Delivery Method): nasal cannula  O2 Flow (L/min): 2          MEDICATIONS  (STANDING):  dextrose 5%. 1000 milliLiter(s) (50 mL/Hr) IV Continuous <Continuous>  dextrose 5%. 1000 milliLiter(s) (100 mL/Hr) IV Continuous <Continuous>  dextrose 50% Injectable 25 Gram(s) IV Push once  dextrose 50% Injectable 12.5 Gram(s) IV Push once  dextrose 50% Injectable 25 Gram(s) IV Push once  enoxaparin Injectable 40 milliGRAM(s) SubCutaneous every 12 hours  gabapentin 300 milliGRAM(s) Oral every 8 hours  glucagon  Injectable 1 milliGRAM(s) IntraMuscular once  insulin lispro (ADMELOG) corrective regimen sliding scale   SubCutaneous Before meals and at bedtime  metFORMIN 500 milliGRAM(s) Oral two times a day  methylPREDNISolone 32 milliGRAM(s) Oral daily  mycophenolate mofetil 500 milliGRAM(s) Oral every 12 hours  pantoprazole    Tablet 40 milliGRAM(s) Oral before breakfast  polyethylene glycol 3350 17 Gram(s) Oral daily  trimethoprim  160 mG/sulfamethoxazole 800 mG 1 Tablet(s) Oral daily    MEDICATIONS  (PRN):  acetaminophen     Tablet .. 650 milliGRAM(s) Oral every 6 hours PRN Temp greater or equal to 38C (100.4F), Mild Pain (1 - 3)  benzocaine/menthol Lozenge 1 Lozenge Oral every 4 hours PRN Sore Throat  dextrose Oral Gel 15 Gram(s) Oral once PRN Blood Glucose LESS THAN 70 milliGRAM(s)/deciliter          Labs:                          17.1   13.13 )-----------( 96       ( 09 Sep 2023 06:44 )             52.4   09-09    131<L>  |  95<L>  |  34<H>  ----------------------------<  189<H>  5.2   |  23  |  0.78    Ca    9.6      09 Sep 2023 06:44  Phos  4.0     09-09  Mg     2.2     09-09    TPro  6.4  /  Alb  3.3  /  TBili  0.7  /  DBili  x   /  AST  37  /  ALT  87<H>  /  AlkPhos  72  09-09          Culture - Fungal, Bronchial (collected 08-30-23 @ 12:54)  Source: .Bronchial RLL BAL    Culture - Bronchial (collected 08-30-23 @ 12:54)  Source: Lavage RLL BAL  Gram Stain (08-30-23 @ 16:26):    Rare epithelial cells    Rare WBC's    Few Gram Positive Rods    Few Gram Positive Cocci in Pairs and Chains  Final Report (09-01-23 @ 14:22):    Normal Respiratory Noreen present including    7,000 CFU/ml Haemophilus parainfluenzae

## 2023-09-09 NOTE — PROGRESS NOTE ADULT - SUBJECTIVE AND OBJECTIVE BOX
PULMONARY CONSULT SERVICE FOLLOW-UP NOTE    INTERVAL HPI:  Reviewed chart and overnight events; patient seen and examined at bedside.    MEDICATIONS:  Pulmonary:    Antimicrobials:  trimethoprim  160 mG/sulfamethoxazole 800 mG 1 Tablet(s) Oral daily    Anticoagulants:  enoxaparin Injectable 40 milliGRAM(s) SubCutaneous every 12 hours    Cardiac:      Allergies    No Known Allergies    Intolerances        Vital Signs Last 24 Hrs  T(C): 36.6 (09 Sep 2023 06:24), Max: 37.3 (08 Sep 2023 20:59)  T(F): 97.8 (09 Sep 2023 06:24), Max: 99.1 (08 Sep 2023 20:59)  HR: 78 (09 Sep 2023 06:24) (75 - 84)  BP: 131/85 (09 Sep 2023 06:24) (127/77 - 131/85)  BP(mean): 99 (08 Sep 2023 20:59) (99 - 99)  RR: 18 (09 Sep 2023 06:24) (18 - 18)  SpO2: 96% (09 Sep 2023 06:24) (92% - 96%)    Parameters below as of 09 Sep 2023 06:24  Patient On (Oxygen Delivery Method): nasal cannula  O2 Flow (L/min): 2          PHYSICAL EXAM:  Constitutional: WD  HEENT: NC/AT; PERRL, anicteric sclera; MMM  Neck: supple  Lymph: No supraclavical LAD or cervical chain LAD  Cardiovascular: +S1/S2, RRR  Respiratory: CTA B/L; no W/R/R  Gastrointestinal: soft, NT/ND  Extremities: WWP; no edema, clubbing or cyanosis  Vascular: 2+ radial and pedal pulses  Neurological: AAOx3; no focal deficits    LABS:      CBC Full  -  ( 09 Sep 2023 06:44 )  WBC Count : 13.13 K/uL  RBC Count : 5.52 M/uL  Hemoglobin : 17.1 g/dL  Hematocrit : 52.4 %  Platelet Count - Automated : 96 K/uL  Mean Cell Volume : 94.9 fl  Mean Cell Hemoglobin : 31.0 pg  Mean Cell Hemoglobin Concentration : 32.6 gm/dL  Auto Neutrophil # : 11.90 K/uL  Auto Lymphocyte # : 0.57 K/uL  Auto Monocyte # : 0.44 K/uL  Auto Eosinophil # : 0.02 K/uL  Auto Basophil # : 0.03 K/uL  Auto Neutrophil % : 90.6 %  Auto Lymphocyte % : 4.3 %  Auto Monocyte % : 3.4 %  Auto Eosinophil % : 0.2 %  Auto Basophil % : 0.2 %    09-09    131<L>  |  95<L>  |  34<H>  ----------------------------<  189<H>  5.2   |  23  |  0.78    Ca    9.6      09 Sep 2023 06:44  Phos  4.0     09-09  Mg     2.2     09-09    TPro  6.4  /  Alb  3.3  /  TBili  0.7  /  DBili  x   /  AST  37  /  ALT  87<H>  /  AlkPhos  72  09-09          Urinalysis Basic - ( 09 Sep 2023 06:44 )    Color: x / Appearance: x / SG: x / pH: x  Gluc: 189 mg/dL / Ketone: x  / Bili: x / Urobili: x   Blood: x / Protein: x / Nitrite: x   Leuk Esterase: x / RBC: x / WBC x   Sq Epi: x / Non Sq Epi: x / Bacteria: x    RADIOLOGY & ADDITIONAL STUDIES:    < from: Xray Chest 1 View- PORTABLE-Urgent (Xray Chest 1 View- PORTABLE-Urgent .) (09.07.23 @ 09:13) >    ACC: [id number] EXAM:  XR CHEST PORTABLE URGENT 1V   ORDERED BY: ALEX CARRASCO     PROCEDURE DATE:  09/07/2023          INTERPRETATION:  Portable chest    HISTORY: Abnormal breath sounds    IMPRESSION:    Increased hypoinflation compared to prior exam 8/30/2023. Bibasilar focal   atelectasis, lung base infiltrates cannot be excluded. Upper lungs   grossly clear. No pleural effusion or pneumothorax.    --- End of Report ---      < end of copied text >   PULMONARY CONSULT SERVICE FOLLOW-UP NOTE    INTERVAL HPI:  Reviewed chart and overnight events; patient seen and examined at bedside. She feels well from a respiratory standpoint but continues to complain of limb weakness and would like to see a neurologist or rheumatologist.     MEDICATIONS:  Pulmonary:    Antimicrobials:  trimethoprim  160 mG/sulfamethoxazole 800 mG 1 Tablet(s) Oral daily    Anticoagulants:  enoxaparin Injectable 40 milliGRAM(s) SubCutaneous every 12 hours    Cardiac:      Allergies    No Known Allergies    Intolerances        Vital Signs Last 24 Hrs  T(C): 36.6 (09 Sep 2023 06:24), Max: 37.3 (08 Sep 2023 20:59)  T(F): 97.8 (09 Sep 2023 06:24), Max: 99.1 (08 Sep 2023 20:59)  HR: 78 (09 Sep 2023 06:24) (75 - 84)  BP: 131/85 (09 Sep 2023 06:24) (127/77 - 131/85)  BP(mean): 99 (08 Sep 2023 20:59) (99 - 99)  RR: 18 (09 Sep 2023 06:24) (18 - 18)  SpO2: 96% (09 Sep 2023 06:24) (92% - 96%)    Parameters below as of 09 Sep 2023 06:24  Patient On (Oxygen Delivery Method): nasal cannula  O2 Flow (L/min): 2          PHYSICAL EXAM:  Constitutional: WD  HEENT: NC/AT; PERRL, anicteric sclera; MMM  Neck: supple  Lymph: No supraclavical LAD or cervical chain LAD  Cardiovascular: +S1/S2, RRR  Respiratory: CTA B/L; no W/R/R  Gastrointestinal: soft, NT/ND  Extremities: WWP; no edema, clubbing or cyanosis  Vascular: 2+ radial and pedal pulses  Neurological: AAOx3; no focal deficits    LABS:      CBC Full  -  ( 09 Sep 2023 06:44 )  WBC Count : 13.13 K/uL  RBC Count : 5.52 M/uL  Hemoglobin : 17.1 g/dL  Hematocrit : 52.4 %  Platelet Count - Automated : 96 K/uL  Mean Cell Volume : 94.9 fl  Mean Cell Hemoglobin : 31.0 pg  Mean Cell Hemoglobin Concentration : 32.6 gm/dL  Auto Neutrophil # : 11.90 K/uL  Auto Lymphocyte # : 0.57 K/uL  Auto Monocyte # : 0.44 K/uL  Auto Eosinophil # : 0.02 K/uL  Auto Basophil # : 0.03 K/uL  Auto Neutrophil % : 90.6 %  Auto Lymphocyte % : 4.3 %  Auto Monocyte % : 3.4 %  Auto Eosinophil % : 0.2 %  Auto Basophil % : 0.2 %    09-09    131<L>  |  95<L>  |  34<H>  ----------------------------<  189<H>  5.2   |  23  |  0.78    Ca    9.6      09 Sep 2023 06:44  Phos  4.0     09-09  Mg     2.2     09-09    TPro  6.4  /  Alb  3.3  /  TBili  0.7  /  DBili  x   /  AST  37  /  ALT  87<H>  /  AlkPhos  72  09-09          Urinalysis Basic - ( 09 Sep 2023 06:44 )    Color: x / Appearance: x / SG: x / pH: x  Gluc: 189 mg/dL / Ketone: x  / Bili: x / Urobili: x   Blood: x / Protein: x / Nitrite: x   Leuk Esterase: x / RBC: x / WBC x   Sq Epi: x / Non Sq Epi: x / Bacteria: x    RADIOLOGY & ADDITIONAL STUDIES:    < from: Xray Chest 1 View- PORTABLE-Urgent (Xray Chest 1 View- PORTABLE-Urgent .) (09.07.23 @ 09:13) >    ACC: [id number] EXAM:  XR CHEST PORTABLE URGENT 1V   ORDERED BY: ALEX CARRASCO     PROCEDURE DATE:  09/07/2023          INTERPRETATION:  Portable chest    HISTORY: Abnormal breath sounds    IMPRESSION:    Increased hypoinflation compared to prior exam 8/30/2023. Bibasilar focal   atelectasis, lung base infiltrates cannot be excluded. Upper lungs   grossly clear. No pleural effusion or pneumothorax.    --- End of Report ---      < end of copied text >

## 2023-09-09 NOTE — PROGRESS NOTE ADULT - PROBLEM SELECTOR PLAN 1
STABLE. 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 Hb and Hct elevated in setting of chronic sob. CXR revealed ground glass opacity. CTA revealed interstitial lung disease.  Infectious workup negative so far. Hypersensitivity pneumonitis panel wnl. stable. Ambulatory sats have been stable around high 80s/ low 90s with quick recovery to baseline. s/p solu-medrol 60mg course & tapering 40mg q6 9/6, 30mg q6 9/7, 20mg 9/8, transition to PO methylprednisone 20mg qd after     - f/u 12pm lab  - continue steroid tapering course   - start cellcept   - f/u rheum consult and workup  - continue to monitor sxs and obtain ambulatory sats.

## 2023-09-09 NOTE — PROGRESS NOTE ADULT - ASSESSMENT
62 year old female with a past medical history of afib, and sciatica, who presented to [facility] for shortness of breath found to be hypoxic and have interstitial lung disease, admitted for AHRF, further ILD workup and management; Respiratory status improving and medically stable.

## 2023-09-09 NOTE — PROGRESS NOTE ADULT - ASSESSMENT
Per JULIANA Valentino, patient's packet is to be faxed to [facility], but there are other packets in review ahead of patient.     [facility] fax#[phone]    There are no other facilities to refer patient to this weekend due to all other facilities being full     AMIE Carver was notified of the above and will be faxing the packet to [facility].        62 year old female with a past medical history of afib, and sciatica, who presented to [facility] for shortness of breath. Pulmonary consulted for acute hypoxic respiratory failure.     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

## 2023-09-09 NOTE — PROGRESS NOTE ADULT - NUTRITIONAL ASSESSMENT
This patient has been assessed with a concern for Malnutrition and has been determined to have a diagnosis/diagnoses of Morbid obesity (BMI > 40).    This patient is being managed with:   Diet Regular-  1000mL Fluid Restriction ([id number])  Entered: Sep  9 2023  8:10AM

## 2023-09-09 NOTE — PROGRESS NOTE ADULT - PROBLEM SELECTOR PLAN 2
ARIANA (1:1280 speckled), anti-smith (positive), complement levels (C3 normal, C4 low), anti RNP (elevated; >8)  SARS COV-2 spike protein antibody positive; CRP and ESR elevated    Hypersensitivity pneumonitis panel negative  Uploaded prior CT chest from 6/2023 that looks essentially normal.  TTE wnl    -Continue IV solumedrol but please taper to 40 mg IV q6 starting today, and then 30 mg IV q6 tomorrow, followed by 20 mg IV q6 on Friday, and the transition to methylprednisolone 32 mg PO qd starting Saturday  -Please start Cellcept 500 mg PO this evening. If she tolerates it well, then we can go up to Cellcept 500 mg BID starting tomorrow  -If she continues to improve we will plan to discharge on Sunday on PO methylprednisolone 32mg PO qd and Cellcept 500 mg PO BID  -IV Ceftriaxone 5 days total out of an abundance of precaution to cover BAL specimen  -PT evaluation and document ambulatory saturation if possible; will likely require establishment of home oxygen prior to discharge  -Patient walked yesterday morning and ambulatory saturation after going up and down the barboza was 90% on 2L  -Nutrition counseling and weight loss  -Will require dedicated outpatient pulmonary follow up with Dr. Whitney in 2 weeks from discharge with formal PFTs and a 6MWT.  -Repeat CT chest in 6 weeks.  -Will need to walk with suppl O2 prior to discharge ARIANA (1:1280 speckled), anti-smith (positive), complement levels (C3 normal, C4 low), anti RNP (elevated; >8)  SARS COV-2 spike protein antibody positive; CRP and ESR elevated    Hypersensitivity pneumonitis panel negative  Uploaded prior CT chest from 6/2023 that looks essentially normal.  TTE wnl    -Continue with PO methylprednisolone 32mg PO qd and Cellcept 500 mg PO BID  -IV Ceftriaxone 5 days total out of an abundance of precaution to cover BAL specimen  -PT evaluation and document ambulatory saturation if possible; will likely require establishment of home oxygen prior to discharge  -Patient walked and ambulatory saturation after going up and down the [place]bo[place] was 91% on 2L  -Nutrition counseling and weight loss  -Will require dedicated outpatient pulmonary follow up with Dr. Whitney in 2 weeks from discharge with formal PFTs and a 6MWT.  -Repeat CT chest in 6 weeks.  -Will need to walk with suppl O2 prior to discharge

## 2023-09-09 NOTE — PROGRESS NOTE ADULT - PROBLEM SELECTOR PROBLEM 4
Pharmacy requesting clarification on how frequent patient should be using lancets. Pending new order for lancets with frequency added per other diabetic supply orders.   Linda Reid LPN     MCTD (mixed connective tissue disease)

## 2023-09-10 DIAGNOSIS — E87.1 HYPO-OSMOLALITY AND HYPONATREMIA: ICD-10-CM

## 2023-09-10 LAB
ANION GAP SERPL CALC-SCNC: 12 MMOL/L — SIGNIFICANT CHANGE UP (ref 5–17)
ANION GAP SERPL CALC-SCNC: 9 MMOL/L — SIGNIFICANT CHANGE UP (ref 5–17)
APPEARANCE UR: CLEAR — SIGNIFICANT CHANGE UP
BACTERIA # UR AUTO: PRESENT /HPF
BILIRUB UR-MCNC: NEGATIVE — SIGNIFICANT CHANGE UP
BUN SERPL-MCNC: 35 MG/DL — HIGH (ref 7–23)
BUN SERPL-MCNC: 35 MG/DL — HIGH (ref 7–23)
CALCIUM SERPL-MCNC: 9 MG/DL — SIGNIFICANT CHANGE UP (ref 8.4–10.5)
CALCIUM SERPL-MCNC: 9.1 MG/DL — SIGNIFICANT CHANGE UP (ref 8.4–10.5)
CHLORIDE SERPL-SCNC: 95 MMOL/L — LOW (ref 96–108)
CHLORIDE SERPL-SCNC: 96 MMOL/L — SIGNIFICANT CHANGE UP (ref 96–108)
CO2 SERPL-SCNC: 21 MMOL/L — LOW (ref 22–31)
CO2 SERPL-SCNC: 23 MMOL/L — SIGNIFICANT CHANGE UP (ref 22–31)
COLOR SPEC: YELLOW — SIGNIFICANT CHANGE UP
COMMENT - URINE: SIGNIFICANT CHANGE UP
CREAT ?TM UR-MCNC: 134 MG/DL — SIGNIFICANT CHANGE UP
CREAT SERPL-MCNC: 0.74 MG/DL — SIGNIFICANT CHANGE UP (ref 0.5–1.3)
CREAT SERPL-MCNC: 0.91 MG/DL — SIGNIFICANT CHANGE UP (ref 0.5–1.3)
DIFF PNL FLD: ABNORMAL
EGFR: 70 ML/MIN/1.73M2 — SIGNIFICANT CHANGE UP
EGFR: 90 ML/MIN/1.73M2 — SIGNIFICANT CHANGE UP
EPI CELLS # UR: SIGNIFICANT CHANGE UP /HPF (ref 0–5)
GLUCOSE BLDC GLUCOMTR-MCNC: 112 MG/DL — HIGH (ref 70–99)
GLUCOSE BLDC GLUCOMTR-MCNC: 124 MG/DL — HIGH (ref 70–99)
GLUCOSE BLDC GLUCOMTR-MCNC: 134 MG/DL — HIGH (ref 70–99)
GLUCOSE BLDC GLUCOMTR-MCNC: 135 MG/DL — HIGH (ref 70–99)
GLUCOSE BLDC GLUCOMTR-MCNC: 155 MG/DL — HIGH (ref 70–99)
GLUCOSE BLDC GLUCOMTR-MCNC: 172 MG/DL — HIGH (ref 70–99)
GLUCOSE BLDC GLUCOMTR-MCNC: 173 MG/DL — HIGH (ref 70–99)
GLUCOSE BLDC GLUCOMTR-MCNC: 234 MG/DL — HIGH (ref 70–99)
GLUCOSE SERPL-MCNC: 125 MG/DL — HIGH (ref 70–99)
GLUCOSE SERPL-MCNC: 202 MG/DL — HIGH (ref 70–99)
GLUCOSE UR QL: NEGATIVE — SIGNIFICANT CHANGE UP
HCT VFR BLD CALC: 52 % — HIGH (ref 34.5–45)
HGB BLD-MCNC: 17.3 G/DL — HIGH (ref 11.5–15.5)
KETONES UR-MCNC: NEGATIVE — SIGNIFICANT CHANGE UP
LEUKOCYTE ESTERASE UR-ACNC: NEGATIVE — SIGNIFICANT CHANGE UP
MAGNESIUM SERPL-MCNC: 2 MG/DL — SIGNIFICANT CHANGE UP (ref 1.6–2.6)
MCHC RBC-ENTMCNC: 30.8 PG — SIGNIFICANT CHANGE UP (ref 27–34)
MCHC RBC-ENTMCNC: 33.3 GM/DL — SIGNIFICANT CHANGE UP (ref 32–36)
MCV RBC AUTO: 92.7 FL — SIGNIFICANT CHANGE UP (ref 80–100)
NITRITE UR-MCNC: NEGATIVE — SIGNIFICANT CHANGE UP
NRBC # BLD: 0 /100 WBCS — SIGNIFICANT CHANGE UP (ref 0–0)
OSMOLALITY SERPL: 283 MOSM/KG — SIGNIFICANT CHANGE UP (ref 280–301)
OSMOLALITY UR: 888 MOSM/KG — SIGNIFICANT CHANGE UP (ref 300–900)
PH UR: 5.5 — SIGNIFICANT CHANGE UP (ref 5–8)
PHOSPHATE SERPL-MCNC: 2.5 MG/DL — SIGNIFICANT CHANGE UP (ref 2.5–4.5)
PLATELET # BLD AUTO: 103 K/UL — LOW (ref 150–400)
POTASSIUM SERPL-MCNC: 5.2 MMOL/L — SIGNIFICANT CHANGE UP (ref 3.5–5.3)
POTASSIUM SERPL-MCNC: SIGNIFICANT CHANGE UP MMOL/L (ref 3.5–5.3)
POTASSIUM SERPL-SCNC: 5.2 MMOL/L — SIGNIFICANT CHANGE UP (ref 3.5–5.3)
POTASSIUM SERPL-SCNC: SIGNIFICANT CHANGE UP MMOL/L (ref 3.5–5.3)
PROT UR-MCNC: ABNORMAL MG/DL
RAPID RVP RESULT: SIGNIFICANT CHANGE UP
RBC # BLD: 5.61 M/UL — HIGH (ref 3.8–5.2)
RBC # FLD: 14.6 % — HIGH (ref 10.3–14.5)
RBC CASTS # UR COMP ASSIST: < 5 /HPF — SIGNIFICANT CHANGE UP
SARS-COV-2 RNA SPEC QL NAA+PROBE: SIGNIFICANT CHANGE UP
SODIUM SERPL-SCNC: 128 MMOL/L — LOW (ref 135–145)
SODIUM SERPL-SCNC: 128 MMOL/L — LOW (ref 135–145)
SODIUM UR-SCNC: <20 MMOL/L — SIGNIFICANT CHANGE UP
SP GR SPEC: 1.02 — SIGNIFICANT CHANGE UP (ref 1–1.03)
UROBILINOGEN FLD QL: 1 E.U./DL — SIGNIFICANT CHANGE UP
WBC # BLD: 11.62 K/UL — HIGH (ref 3.8–10.5)
WBC # FLD AUTO: 11.62 K/UL — HIGH (ref 3.8–10.5)
WBC UR QL: < 5 /HPF — SIGNIFICANT CHANGE UP

## 2023-09-10 PROCEDURE — 99233 SBSQ HOSP IP/OBS HIGH 50: CPT | Mod: GC

## 2023-09-10 PROCEDURE — 71045 X-RAY EXAM CHEST 1 VIEW: CPT | Mod: 26

## 2023-09-10 PROCEDURE — 99222 1ST HOSP IP/OBS MODERATE 55: CPT

## 2023-09-10 PROCEDURE — 93010 ELECTROCARDIOGRAM REPORT: CPT

## 2023-09-10 RX ADMIN — GABAPENTIN 300 MILLIGRAM(S): 400 CAPSULE ORAL at 21:52

## 2023-09-10 RX ADMIN — METFORMIN HYDROCHLORIDE 500 MILLIGRAM(S): 850 TABLET ORAL at 06:30

## 2023-09-10 RX ADMIN — Medication 1 TABLET(S): at 11:41

## 2023-09-10 RX ADMIN — Medication 32 MILLIGRAM(S): at 06:29

## 2023-09-10 RX ADMIN — ENOXAPARIN SODIUM 40 MILLIGRAM(S): 100 INJECTION SUBCUTANEOUS at 17:29

## 2023-09-10 RX ADMIN — MYCOPHENOLATE MOFETIL 500 MILLIGRAM(S): 250 CAPSULE ORAL at 21:52

## 2023-09-10 RX ADMIN — ENOXAPARIN SODIUM 40 MILLIGRAM(S): 100 INJECTION SUBCUTANEOUS at 06:30

## 2023-09-10 RX ADMIN — PANTOPRAZOLE SODIUM 40 MILLIGRAM(S): 20 TABLET, DELAYED RELEASE ORAL at 06:30

## 2023-09-10 RX ADMIN — GABAPENTIN 300 MILLIGRAM(S): 400 CAPSULE ORAL at 06:30

## 2023-09-10 RX ADMIN — GABAPENTIN 300 MILLIGRAM(S): 400 CAPSULE ORAL at 13:39

## 2023-09-10 RX ADMIN — METFORMIN HYDROCHLORIDE 500 MILLIGRAM(S): 850 TABLET ORAL at 17:29

## 2023-09-10 NOTE — PROGRESS NOTE ADULT - PROBLEM SELECTOR PLAN 1
Improving with IV steroids and is currently on 2-3LPM NC.    Presenting in acute respiratory failure, with imaging should bilateral reticulations and ground glass opacities. Given reticular pattern, in particular in a subpleural pattern with a cranial caudal distrubution, GGOs, and arcade-like sign- this constellation of CT chest findings noted on HRCT favors mainly diagnoses of organizing pneumonia and NSIP 2/2 CTD (highly elevated anti-RNP). Patient may have certainly presented with ILD prior to rheumatological manifestations of MCTD. Her subclinical fever this AM and symptoms are unlikely to be due to Cellcept. Her CXR is largely unchanged compared to prior. We would recommend continuing with Cellcept at qd dosing for now once infection has been ruled out.

## 2023-09-10 NOTE — CONSULT NOTE ADULT - TIME BILLING
As above
Patient seen and examined with house-staff during bedside rounds.  Resident note read, including vitals, physical findings, laboratory data, and radiological reports.   Revisions included below.  Direct personal management at bed side and extensive interpretation of the data.  Plan was outlined and discussed in details with the housestaff.  Decision making of high complexity  Action taken for acute disease activity to reflect the level of care provided:  - medication reconciliation  - review laboratory data  as above and including mosaic pattern in CT scan.  CT scan findings are not consistent with UIP.  CTD and hypersensitivity panel workup.  PFT and may need Bronch with Bx.  Hold steroids till bronch
evaluation, coordination of care, counseling

## 2023-09-10 NOTE — PROGRESS NOTE ADULT - NUTRITIONAL ASSESSMENT
This patient has been assessed with a concern for Malnutrition and has been determined to have a diagnosis/diagnoses of Morbid obesity (BMI > 40).    This patient is being managed with:   Diet Regular-  Entered: Sep  9 2023  5:16PM

## 2023-09-10 NOTE — PROGRESS NOTE ADULT - PROBLEM SELECTOR PLAN 5
Na 128 this AM. likely SIADH i/s/o nausea, weakness  Serum Na pending   Plan:   - f/u Urine Lytes and serum osmolarity

## 2023-09-10 NOTE — PROGRESS NOTE ADULT - SUBJECTIVE AND OBJECTIVE BOX
PULMONARY CONSULT SERVICE FOLLOW-UP NOTE    INTERVAL HPI:  Reviewed chart and overnight events; patient seen and examined at bedside. Patient states initially this morning she felt like she had a fever, headache, and some trouble breathing but it resolved in a few hours and now she feels back at her baseline.     MEDICATIONS:  Pulmonary:    Antimicrobials:  trimethoprim  160 mG/sulfamethoxazole 800 mG 1 Tablet(s) Oral daily    Anticoagulants:  enoxaparin Injectable 40 milliGRAM(s) SubCutaneous every 12 hours    Cardiac:      Allergies    No Known Allergies    Intolerances        Vital Signs Last 24 Hrs  T(C): 37.3 (10 Sep 2023 11:29), Max: 37.9 (10 Sep 2023 09:06)  T(F): 99.2 (10 Sep 2023 11:29), Max: 100.2 (10 Sep 2023 09:06)  HR: 89 (10 Sep 2023 11:29) (89 - 118)  BP: 115/74 (10 Sep 2023 11:29) (115/74 - 145/80)  BP(mean): --  RR: 20 (10 Sep 2023 11:29) (18 - 20)  SpO2: 94% (10 Sep 2023 11:29) (89% - 95%)    Parameters below as of 10 Sep 2023 11:29  Patient On (Oxygen Delivery Method): nasal cannula  O2 Flow (L/min): 3      PHYSICAL EXAM:  Constitutional: WD  HEENT: NC/AT; PERRL, anicteric sclera; MMM  Neck: supple  Lymph: No supraclavical LAD or cervical chain LAD  Cardiovascular: +S1/S2, RRR  Respiratory: Faint inspiratory crackles bilaterally   Gastrointestinal: soft, NT/ND  Extremities: WWP; no edema, clubbing or cyanosis  Vascular: 2+ radial and pedal pulses  Neurological: AAOx3; no focal deficits    LABS:      CBC Full  -  ( 10 Sep 2023 07:12 )  WBC Count : 11.62 K/uL  RBC Count : 5.61 M/uL  Hemoglobin : 17.3 g/dL  Hematocrit : 52.0 %  Platelet Count - Automated : 103 K/uL  Mean Cell Volume : 92.7 fl  Mean Cell Hemoglobin : 30.8 pg  Mean Cell Hemoglobin Concentration : 33.3 gm/dL  Auto Neutrophil # : x  Auto Lymphocyte # : x  Auto Monocyte # : x  Auto Eosinophil # : x  Auto Basophil # : x  Auto Neutrophil % : x  Auto Lymphocyte % : x  Auto Monocyte % : x  Auto Eosinophil % : x  Auto Basophil % : x    09-10    128<L>  |  96  |  35<H>  ----------------------------<  125<H>  SEE NOTE   |  23  |  0.74    Ca    9.0      10 Sep 2023 07:12  Phos  2.5     09-10  Mg     2.0     09-10    TPro  6.4  /  Alb  3.3  /  TBili  0.7  /  DBili  x   /  AST  37  /  ALT  87<H>  /  AlkPhos  72  09-09          Urinalysis Basic - ( 10 Sep 2023 07:12 )    Color: x / Appearance: x / SG: x / pH: x  Gluc: 125 mg/dL / Ketone: x  / Bili: x / Urobili: x   Blood: x / Protein: x / Nitrite: x   Leuk Esterase: x / RBC: x / WBC x   Sq Epi: x / Non Sq Epi: x / Bacteria: x    RADIOLOGY & ADDITIONAL STUDIES:    < from: Xray Chest 1 View-PORTABLE IMMEDIATE (Xray Chest 1 View-PORTABLE IMMEDIATE .) (09.10.23 @ 10:21) >  ACC: [id number] EXAM:  XR CHEST PORTABLE IMMED 1V   ORDERED BY: DANIEL WAGGONER     PROCEDURE DATE:  09/10/2023          INTERPRETATION:  CLINICAL INDICATION:  SOB    TECHNIQUE: Frontal chest radiograph on 9/10/2023 9:54 AM.    COMPARISON: Radiographs ofthe chest from 9/7/2023 and 8/30/2023. CT of   the chest from 8/28/2023.    FINDINGS:  Since 9/7/2023, no significant interval change in bilateral basilar   predominant reticular opacities consistent with known interstitial lung   disease. Findings are better evaluated on recent CT of the chest from   8/28/2023. There is no new focal consolidation, pleural effusion or   pneumothorax. The cardiomediastinal silhouette and osseous structures are   unchanged.    IMPRESSION:  Stable exam when comparedto most recent prior chest radiograph from   9/7/2023.    --- End of Report ---      < end of copied text >

## 2023-09-10 NOTE — PROGRESS NOTE ADULT - SUBJECTIVE AND OBJECTIVE BOX
OVERNIGHT EVENTS: SABRA    SUBJECTIVE:  The pt was seen and examined at the bedside. The pt states that she is feeling more tired than prior this AM and that she has muscle pain in her extremities. She states that pulling herself up in bed has become harder everyday becuase of the weakness. She was also complaining of mild chest pressure which is not new. The pt states that she is finding it hard to breathe as well. The pt has good appetite but states she has not had a BM in 3 days    ROS: Negative except for above    VITAL SIGNS:  Vital Signs Last 24 Hrs  T(C): 37.3 (10 Sep 2023 11:29), Max: 37.9 (10 Sep 2023 09:06)  T(F): 99.2 (10 Sep 2023 11:29), Max: 100.2 (10 Sep 2023 09:06)  HR: 89 (10 Sep 2023 11:29) (77 - 118)  BP: 115/74 (10 Sep 2023 11:29) (115/74 - 145/80)  BP(mean): --  RR: 20 (10 Sep 2023 11:29) (17 - 20)  SpO2: 94% (10 Sep 2023 11:29) (89% - 95%)    Parameters below as of 10 Sep 2023 11:29  Patient On (Oxygen Delivery Method): nasal cannula  O2 Flow (L/min): 3      PHYSICAL EXAM:  General: Not in distress at the moment, Speaking full sentences and cooperating to exam.   HEENT: PERRL; EOMI; MMM  Neck: supple; no JVD  Cardiac: RRR; +S1/S2, no murmurs or gallops  Pulm: CTA B/L; no W/R/R, using NC 2L   GI: soft, NT/ND, +BS  Extremities: Mild edema, extremities WWP; clubbing or cyanosis  Vasc: 2+ radial, DP pulses B/L  Neuro: AAOx3; no focal deficits    MEDICATIONS:  MEDICATIONS  (STANDING):  dextrose 5%. 1000 milliLiter(s) (100 mL/Hr) IV Continuous <Continuous>  dextrose 5%. 1000 milliLiter(s) (50 mL/Hr) IV Continuous <Continuous>  dextrose 50% Injectable 12.5 Gram(s) IV Push once  dextrose 50% Injectable 25 Gram(s) IV Push once  dextrose 50% Injectable 25 Gram(s) IV Push once  enoxaparin Injectable 40 milliGRAM(s) SubCutaneous every 12 hours  gabapentin 300 milliGRAM(s) Oral every 8 hours  glucagon  Injectable 1 milliGRAM(s) IntraMuscular once  insulin lispro (ADMELOG) corrective regimen sliding scale   SubCutaneous Before meals and at bedtime  metFORMIN 500 milliGRAM(s) Oral two times a day  methylPREDNISolone 32 milliGRAM(s) Oral daily  mycophenolate mofetil 500 milliGRAM(s) Oral every 12 hours  pantoprazole    Tablet 40 milliGRAM(s) Oral before breakfast  polyethylene glycol 3350 17 Gram(s) Oral daily  trimethoprim  160 mG/sulfamethoxazole 800 mG 1 Tablet(s) Oral daily    MEDICATIONS  (PRN):  acetaminophen     Tablet .. 650 milliGRAM(s) Oral every 6 hours PRN Temp greater or equal to 38C (100.4F), Mild Pain (1 - 3)  benzocaine/menthol Lozenge 1 Lozenge Oral every 4 hours PRN Sore Throat  dextrose Oral Gel 15 Gram(s) Oral once PRN Blood Glucose LESS THAN 70 milliGRAM(s)/deciliter      ALLERGIES:  Allergies    No Known Allergies    Intolerances        LABS:                        17.3   11.62 )-----------( 103      ( 10 Sep 2023 07:12 )             52.0     09-10    128<L>  |  96  |  35<H>  ----------------------------<  125<H>  SEE NOTE   |  23  |  0.74    Ca    9.0      10 Sep 2023 07:12  Phos  2.5     09-10  Mg     2.0     09-10    TPro  6.4  /  Alb  3.3  /  TBili  0.7  /  DBili  x   /  AST  37  /  ALT  87<H>  /  AlkPhos  72  09-09        RADIOLOGY & ADDITIONAL TESTS: Reviewed.

## 2023-09-10 NOTE — PROGRESS NOTE ADULT - ASSESSMENT
Subjective:     Patient ID: Bird Spear is a 71 y.o. female.    Chief Complaint: Bilateral hip pain, right shoulder pain    History of Present Illness    Ms. Spear presents to clinic with a two-year history of pain noted at bilateral hips.  Pain present at the lateral aspect of bilateral hips worse on the right side.  Reports that she had initially experienced pain after her back surgery which was in 2012 but really over the last 2 years has pain missing worse at hips.  Denies that she's had corticosteroid injections in the past that she has had x-rays of her hips at outside facility which were negative.  The imaging is not available for viewing.  She denies that her legs are giving out on her but has noticed the pain radiating at the right hip down the lateral aspect of her leg to her knee and onto her ankle.  Rates pain at a 6-7 out of a 10 aching and pulling in nature.  She notices pain when she is lying on either side at night and try to get comfortable which she is unable to do secondary pain.  She also experiences pain at the lateral aspect of her hips while sitting in a hard surface chair and for sitting for long periods of time and with abduction motions of both hips.  She has applied a heating pad as needed in bilateral hips however does not receive significant symptom relief with a heating pad.  Denies presence of numbness and tingling radiating down bilateral lower extremities.    She would also like to be seen for her right shoulder pain present over the lateral posterior aspect of her shoulder radiates down inferiorly to her mid humerus.  Began noticing within the last 3 months after completing an increased amount of luis beans which included her lifting the jars up into the canner which was over her head.  She reports pain present when lying on her right shoulder at night and initially just began noticing the aching sensation intermittently however the last few months the pain has been constant  aching without any symptom relief.  Denies previous x-rays at her right shoulder, denies previously receiving glucose steroid injections at her right shoulder in the past.  Increased pain noted with reaching up overhead and reaching back behind herself.  Denies that she's had any previous injury at her right shoulder in the past but she is right-hand dominant.  Denies presence of numbness and tingling radiating from shoulder to hand on her right upper extremity denies pain radiating up into the right side of her neck.  Denies other concerns present this time.     Social History     Occupational History   • Not on file.     Social History Main Topics   • Smoking status: Never Smoker   • Smokeless tobacco: Not on file   • Alcohol use No      Comment: caffeine use   • Drug use: Not on file   • Sexual activity: Not on file      Past Medical History:   Diagnosis Date   • Abdominal pain    • Abnormal electrocardiogram    • Arthritis 2016   • Arthritis of neck    • Chronic pain syndrome 2016   • Depression    • Edema    • Essential hypertension 2016   • Fatigue    • Hematuria 2017   • Hx of radiation therapy    • Hyperlipidemia 2016   • Hypertension    • Hypothyroidism    • Hypothyroidism (acquired) 2016   • Lumbosacral disc disease    • Malignant neoplasm of breast    • Renal failure    • Scoliosis    • Thoracic aortic aneurysm    • Thyroid disease      Past Surgical History:   Procedure Laterality Date   • BACK SURGERY     • BREAST LUMPECTOMY Right    •  SECTION     • HYSTERECTOMY     • SPINAL FUSION         Family History   Problem Relation Age of Onset   • Heart disease Mother    • Hyperlipidemia Mother    • Diabetes Mother    • Hypertension Mother    • Heart disease Father      Coronary artery disease   • Hyperlipidemia Father    • Hypertension Father    • Heart disease Sister    • Hypertension Other          Review of Systems   Constitutional: Negative for chills, diaphoresis,  "fever and unexpected weight change.   HENT: Negative for hearing loss, nosebleeds, sore throat and tinnitus.    Eyes: Negative for pain and visual disturbance.   Respiratory: Negative for cough, shortness of breath and wheezing.    Cardiovascular: Negative for chest pain and palpitations.   Gastrointestinal: Positive for abdominal pain. Negative for diarrhea, nausea and vomiting.   Endocrine: Positive for heat intolerance. Negative for cold intolerance and polydipsia.   Genitourinary: Positive for hematuria. Negative for difficulty urinating and dysuria.   Musculoskeletal: Positive for arthralgias and myalgias. Negative for joint swelling.   Skin: Negative for rash and wound.   Allergic/Immunologic: Negative for environmental allergies.   Neurological: Negative for dizziness, syncope and numbness.   Hematological: Does not bruise/bleed easily.   Psychiatric/Behavioral: Negative for dysphoric mood and sleep disturbance. The patient is not nervous/anxious.            Objective:  Physical Exam    Vital signs reviewed.   General: No acute distress.  Eyes: conjunctiva clear; pupils equally round and reactive  ENT: external ears and nose atraumatic; oropharynx clear  CV: no peripheral edema  Resp: normal respiratory effort  Skin: no rashes or wounds; normal turgor  Psych: mood and affect appropriate; recent and remote memory intact    Vitals:    12/13/17 1311   BP: 121/79   Pulse: 63   Weight: 67.1 kg (148 lb)   Height: 149.9 cm (59\")     Last 2 weights    12/13/17  1311   Weight: 67.1 kg (148 lb)     Body mass index is 29.89 kg/(m^2).     Right Hip Exam     Tenderness   The patient is experiencing tenderness in the greater trochanter.    Range of Motion   Extension: 0   Flexion: 100   Abduction: 45   Adduction: 30     Tests   DINO: negative  Mario: positive  Fadir:  Negative FADIR test    Other   Erythema: absent  Scars: absent  Sensation: normal  Pulse: present      Left Hip Exam     Tenderness   The patient is " experiencing tenderness in the greater trochanter.    Range of Motion   Extension: 0   Flexion: 100   Abduction: 45   Adduction: 30     Tests   DINO: negative  Mario: positive  Fadir:  Negative FADIR test    Other   Erythema: absent  Scars: absent  Sensation: normal  Pulse: present      Right Shoulder Exam     Tenderness   The patient is experiencing tenderness in the acromion.    Range of Motion   Forward Flexion: 180+   External Rotation: 50   Internal Rotation 0 degrees: L2     Muscle Strength   Internal Rotation: 4/5   External Rotation: 5/5   Supraspinatus: 4/5   Subscapularis: 4/5   Biceps: 5/5     Tests   Apprehension: negative  Cross Arm: positive  Drop Arm: negative  Hawkin's test: positive  Impingement: positive  Sulcus: absent    Other   Erythema: absent  Scars: absent  Sensation: normal  Pulse: present    Comments:  Mildly positive empty can  negative [place]  negative Speed's  Mildly positive bear hug exam                Imaging:  Right Hip X-Ray  Indication: Pain  AP and Frog Leg views    Findings:  No fracture  No bony lesion  Normal soft tissues  Normal joint spaces    No prior studies were available for comparison.    Right Shoulder X-Ray  Indication: Pain  AP Internal and External Rotation views    Findings:  No fracture  No bony lesion  Normal soft tissues  AC joint arthritis     No prior studies were available for comparison.    Assessment:       1. Pain    2. Greater trochanteric bursitis of both hips    3. Tendinopathy of rotator cuff, right    4. Acromioclavicular joint arthritis          Plan:  1.  Discussed plan of care with patient.  Wishes to proceed with corticosteroid injection at her right shoulder and bilateral greater trochanter bursa.  We'll plan to start her on home strengthening exercises at bilateral lower extremities for quad, hamstring, calf strengthening activities.  2.  We'll plan to see her back in 6 weeks to reassess.  Patient verbalized understanding of all information  agrees plan of care.  Denies other concerns present time.  Orders:  Orders Placed This Encounter   Procedures   • Large Joint Arthrocentesis   • Large Joint Arthrocentesis   • XR Hip With or Without Pelvis 2 - 3 View Right   • XR Shoulder 2+ View Right       DESTINI query complete.    Dragon transcription disclaimer     Much of this encounter note is an electronic transcription/translation of spoken language to printed text. The electronic translation of spoken language may permit erroneous, or at times, nonsensical words or phrases to be inadvertently transcribed. Although I have reviewed the note for such errors, some may still exist.  Large Joint Arthrocentesis  Date/Time: 12/13/2017 2:06 PM  Consent given by: patient  Site marked: site marked  Timeout: Immediately prior to procedure a time out was called to verify the correct patient, procedure, equipment, support staff and site/side marked as required   Supporting Documentation  Indications: pain   Procedure Details  Location: hip - Hip joint: bilateral greater trochanteric bursa.  Preparation: Patient was prepped and draped in the usual sterile fashion  Needle size: 22 G  Approach: lateral  Medications administered: 2 mL lidocaine PF 1% 1 %; 2 mL bupivacaine (PF) 0.5 %; 80 mg triamcinolone acetonide 40 MG/ML  Patient tolerance: patient tolerated the procedure well with no immediate complications           62 year old female with a past medical history of afib, and sciatica, who presented to [facility] for shortness of breath. Pulmonary consulted for acute hypoxic respiratory failure.     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

## 2023-09-10 NOTE — PROGRESS NOTE ADULT - PROBLEM SELECTOR PLAN 2
ARIANA (1:1280 speckled), anti-smith (positive), complement levels (C3 normal, C4 low), anti RNP (elevated; >8)  SARS COV-2 spike protein antibody positive; CRP and ESR elevated    Hypersensitivity pneumonitis panel negative  Uploaded prior CT chest from 6/2023 that looks essentially normal.  TTE wnl    -Continue with PO methylprednisolone 32mg PO qd   -Please send broad infectious workup including RVP and UA  -Can restart Cellcept at 500 mg PO qd every evening for the next 3 days. Please note if she has an infectious process that is found then the Cellcept should be held  -After 3 days of qd dosing, would recommend returning to BID dosing of Cellcept until she follows up with Dr. Whitney as outpatient   -PT evaluation and document ambulatory saturation if possible; will likely require establishment of home oxygen prior to discharge  -Nutrition counseling and weight loss  -Will require dedicated outpatient pulmonary follow up with Dr. Whitney in 2 weeks from discharge with formal PFTs and a 6MWT.  -Repeat CT chest in 6 weeks.  -Will need to walk with suppl O2 prior to discharge

## 2023-09-10 NOTE — PROGRESS NOTE ADULT - ATTENDING COMMENTS
Patient was seen and examined at bedside on 9/10/2023 at 830 am. Patient reports that she feels much worse today, she feels extremely tired with any movement. Reports that she was extremely SOB prior to me seeing her when walking which persisted when she went back to bed and was at rest. Denies dizziness, abdominal pain, N/V. ROS is otherwise negative. Vitals, labwork and pertinent imaging reviewed. Physical exam - NAD, AAO x 4, PERRLA, EOMI, supple neck, chest - CTA b/l, CV - rrr, s1s2, no m/r/g, abd - soft, + BS, ext - WWP, psych - normal affect, skin - no rash, back - midline    Plan  -Currently on Medrol, Protonix 40 mg PO qd, Cellcept  -S/p 5 day course of CTX given + cultures (H influenza)  -Given increasing oxygen requirements, symptoms and mild distress will call ICU consult  -C/w Metformin given increased FS - fairly well controlled; pt w/ CGM   -Hyponatremia suspect SIADH, urine lytes c./w this  -Neuro consult given profound weakness  -Check EKG, troponin Patient was seen and examined at bedside on 9/10/2023 at 830 am. Patient reports that she feels much worse today, she feels extremely tired with any movement. Reports that she was extremely SOB prior to me seeing her when walking which persisted when she went back to bed and was at rest. Denies dizziness, abdominal pain, N/V. ROS is otherwise negative. Vitals, labwork and pertinent imaging reviewed. Physical exam - NAD, AAO x 4, PERRLA, EOMI, supple neck, chest - CTA b/l, CV - rrr, s1s2, no m/r/g, abd - soft, + BS, ext - WWP, psych - normal affect, skin - no rash, back - midline    Plan  -Currently on Medrol, Protonix 40 mg PO qd, Cellcept  -S/p 5 day course of CTX given + cultures (H influenza)  -Given increasing oxygen requirements, symptoms and mild distress will call ICU consult  -C/w Metformin given increased FS - fairly well controlled; pt w/ CGM   -Hyponatremia suspect SIADH, urine lytes c./w this  -Neuro consult given profound weakness  -Check EKG, troponin  -Check RVP, BCx

## 2023-09-10 NOTE — PROGRESS NOTE ADULT - ASSESSMENT
62 year old female with a past medical history of afib, and sciatica, who presented to [facility] for shortness of breath found to be hypoxic and have interstitial lung disease, admitted for AHRF, further ILD workup and management; Respiratory status improving and medically stable.

## 2023-09-10 NOTE — PROGRESS NOTE ADULT - ATTENDING COMMENTS
Low grade temp this morning and slight increase in oxygen requirement without any new localizing symptoms or leukocytosis. CXR is unchanged from prior with no new infiltrate or effusion. Rec workup for other sources of infection, and DVT study (on Lovenox ppx) and cont w steroids and MMF as noted above.

## 2023-09-10 NOTE — CONSULT NOTE ADULT - SUBJECTIVE AND OBJECTIVE BOX
NEUROLOGY CONSULT    HPI:  This is a 62 year old female with a past medical history of afib, and sciatica, who presented to [facility] for shortness of breath. Pt states that she went to the urgent care today where she had an XR done concerning for b/l lower infiltrates, and was driven here to the ED by car from the ambulance. She states that she can only walk 12-15 feet and feels short of breath. She states that she has a pulse ox at home and had readings of 82. She states that she had chronic SOB for several months and got worked up in [place] where she had a CT scan that was negative for PE. She also states that she was seen by a pulmonologist and rheumatology, where they ruled out lupus and other immunological disorders. Pt states that she has a cough with no sputum. Pt denies any uri sxs, chest pain, pain with breathing, abdominal pain, nvd, headaches,     ED Course:  Vitals: 97.6, 97, 113/76, 18 94% 2L nc  Labs: Wbc 9.83 Hb 15.4, Hct 46.1, Plt 222, D-dimer 666, Trop T 123, VBG 7.41 CO2 29.3, RVP neg, Cov2 neg,  Imaging: CTA: no visualized PE  EKhr sinus rhythm  Consult: Cardiology (26 Aug 2023 18:38)    Neurology Team on board to evaluation patient.     mRankin score 0 at baseline  0 No symptoms at all  1 No significant disability despite symptoms; able to carry out all usual duties and activities without assistance  2 Slight disability; unable to carry out all previous activities, but able to look after own affairs  3 Moderate disability; requiring some help, but able to walk without assistance  4 Moderately severe disability; unable to walk without assistance and unable to attend to own bodily needs without assistance  5 Severe disability; bedridden, incontinent and requiring constant nursing care and attention  6 Dead     MEDICATIONS  Home Medications:  gabapentin 100 mg oral capsule: 1 orally 3 times a day (07 Sep 2023 16:58)    MEDICATIONS  (STANDING):  dextrose 5%. 1000 milliLiter(s) (100 mL/Hr) IV Continuous <Continuous>  dextrose 5%. 1000 milliLiter(s) (50 mL/Hr) IV Continuous <Continuous>  dextrose 50% Injectable 12.5 Gram(s) IV Push once  dextrose 50% Injectable 25 Gram(s) IV Push once  dextrose 50% Injectable 25 Gram(s) IV Push once  enoxaparin Injectable 40 milliGRAM(s) SubCutaneous every 12 hours  gabapentin 300 milliGRAM(s) Oral every 8 hours  glucagon  Injectable 1 milliGRAM(s) IntraMuscular once  insulin lispro (ADMELOG) corrective regimen sliding scale   SubCutaneous Before meals and at bedtime  metFORMIN 500 milliGRAM(s) Oral two times a day  methylPREDNISolone 32 milliGRAM(s) Oral daily  mycophenolate mofetil 500 milliGRAM(s) Oral every 12 hours  pantoprazole    Tablet 40 milliGRAM(s) Oral before breakfast  polyethylene glycol 3350 17 Gram(s) Oral daily  trimethoprim  160 mG/sulfamethoxazole 800 mG 1 Tablet(s) Oral daily    MEDICATIONS  (PRN):  acetaminophen     Tablet .. 650 milliGRAM(s) Oral every 6 hours PRN Temp greater or equal to 38C (100.4F), Mild Pain (1 - 3)  benzocaine/menthol Lozenge 1 Lozenge Oral every 4 hours PRN Sore Throat  dextrose Oral Gel 15 Gram(s) Oral once PRN Blood Glucose LESS THAN 70 milliGRAM(s)/deciliter      FAMILY HISTORY:    SOCIAL HISTORY: negative for tobacco, alcohol, or ilicit drug use.    Allergies    No Known Allergies    Intolerances        GEN: NAD, pleasant, cooperative    NEURO:   MENTAL STATUS: AAOx3  LANG/SPEECH: Fluent, intact naming, repetition & comprehension  CRANIAL NERVES:  II: Pupils equal and reactive, no RAPD, normal visual field and fundus  III, IV, VI: EOM intact, no gaze preference or deviation  V: normal  VII: no facial asymmetry  VIII: normal hearing to speech  MOTOR: 5/5 in both upper and lower extremities  REFLEXES: 2/4 throughout, bilateral flexor plantars  SENSORY: Normal to touch, temperature & pin prick in all extremiteis  COORD: Normal finger to nose and heel to shin, no tremor, no dysmetria  Gait:   NIHSS: **** ASPECT Score: ***** ICH Score: ****** (GCS)    LABS:                        17.3   11.62 )-----------( 103      ( 10 Sep 2023 07:12 )             52.0     09-10    128<L>  |  96  |  35<H>  ----------------------------<  125<H>  SEE NOTE   |  23  |  0.74    Ca    9.0      10 Sep 2023 07:12  Phos  2.5     09-10  Mg     2.0     09-10    TPro  6.4  /  Alb  3.3  /  TBili  0.7  /  DBili  x   /  AST  37  /  ALT  87<H>  /  AlkPhos  72      Hemoglobin A1C:   Vitamin B12     CAPILLARY BLOOD GLUCOSE      POCT Blood Glucose.: 173 mg/dL (10 Sep 2023 09:14)      Urinalysis Basic - ( 10 Sep 2023 07:12 )    Color: x / Appearance: x / SG: x / pH: x  Gluc: 125 mg/dL / Ketone: x  / Bili: x / Urobili: x   Blood: x / Protein: x / Nitrite: x   Leuk Esterase: x / RBC: x / WBC x   Sq Epi: x / Non Sq Epi: x / Bacteria: x            Microbiology:      RADIOLOGY, EKG AND ADDITIONAL TESTS: Reviewed.           NEUROLOGY CONSULT    HPI:  This is a 62 year old female with a past medical history of afib, and sciatica, who presented to [facility] for shortness of breath. Pt states that she went to the urgent care today where she had an XR done concerning for b/l lower infiltrates, and was driven here to the ED by car from the ambulance. She states that she can only walk 12-15 feet and feels short of breath. She states that she has a pulse ox at home and had readings of 82. She states that she had chronic SOB for several months and got worked up in [place] where she had a CT scan that was negative for PE. She also states that she was seen by a pulmonologist and rheumatology, where they ruled out lupus and other immunological disorders. Pt states that she has a cough with no sputum. Pt denies any uri sxs, chest pain, pain with breathing, abdominal pain, nvd, headaches. Patient started 3 months ago presenting with a subacute episode of intense tingling and burning sensation, crampings, pain and weakness in both hands that progressed proximally and worsened with repetitive movements very incapacitating and debilitating. Initially was diagnosed of bilateral carpal tunnel but never assessed with NCS/EMG as per patient. After going to acupuncture and chiropractor without improvement for a few days, she decided to go to the hospital in [place] for better assessment. She ended up going three different times and was assessed for rheumatological process and likely lupus. She was started on prednisone 20 mg daily on 8/10/23 with improvement of symptoms, also on gabapentin on 23. One month ago patient started to feel worsening of symptoms plus new shortness of breath and now wasting and muscular in jaw while repetitively chewing, on both upper extremities proximal and distally, and on the back part of leg (mainly in the calves), back part of legs and buttocks. Tenth months ago, started with bilateral skin lesions described as painful lumps in both legs. Progressively got also blister-like and red lesions in upper chest. Recently noticed cuticular lesions in her finger nails. No significant loss of weigh but patient has been recently depressed. She believes high stress trigger symptoms a few months ago. Once arrived to [facility], receiving high-dose steroids improving all her symptoms. Recently diagnosed with ILD. Patient does not report light worsen the skin lesions, no eye skin changes, no difficulty swallowing. No blood in body fluids. No new adenopathies.     ED1 Course:  Vitals: 97.6, 97, 113/76, 18 94% 2L nc  Labs: Wbc 9.83 Hb 15.4, Hct 46.1, Plt 222, D-dimer 666, Trop T 123, VBG 7.41 CO2 29.3, RVP neg, Cov2 neg,  Imaging: CTA: no visualized PE  EKhr sinus rhythm  Consult: Cardiology (26 Aug 2023 18:38)    Neurology Team on board to evaluation patient.  Now still pain, burning sensation, cramps and shortness of breath but significantly improving.     mRankin score 0 at baseline    Review of systems negative for all but skin, psychiatry, lungs and peripheral nerve system.      MEDICATIONS  Home Medications:  gabapentin 100 mg oral capsule: 1 orally 3 times a day (07 Sep 2023 16:58)    MEDICATIONS  (STANDING):  dextrose 5%. 1000 milliLiter(s) (100 mL/Hr) IV Continuous <Continuous>  dextrose 5%. 1000 milliLiter(s) (50 mL/Hr) IV Continuous <Continuous>  dextrose 50% Injectable 12.5 Gram(s) IV Push once  dextrose 50% Injectable 25 Gram(s) IV Push once  dextrose 50% Injectable 25 Gram(s) IV Push once  enoxaparin Injectable 40 milliGRAM(s) SubCutaneous every 12 hours  gabapentin 300 milliGRAM(s) Oral every 8 hours  glucagon  Injectable 1 milliGRAM(s) IntraMuscular once  insulin lispro (ADMELOG) corrective regimen sliding scale   SubCutaneous Before meals and at bedtime  metFORMIN 500 milliGRAM(s) Oral two times a day  methylPREDNISolone 32 milliGRAM(s) Oral daily  mycophenolate mofetil 500 milliGRAM(s) Oral every 12 hours  pantoprazole    Tablet 40 milliGRAM(s) Oral before breakfast  polyethylene glycol 3350 17 Gram(s) Oral daily  trimethoprim  160 mG/sulfamethoxazole 800 mG 1 Tablet(s) Oral daily    MEDICATIONS  (PRN):  acetaminophen     Tablet .. 650 milliGRAM(s) Oral every 6 hours PRN Temp greater or equal to 38C (100.4F), Mild Pain (1 - 3)  benzocaine/menthol Lozenge 1 Lozenge Oral every 4 hours PRN Sore Throat  dextrose Oral Gel 15 Gram(s) Oral once PRN Blood Glucose LESS THAN 70 milliGRAM(s)/deciliter      FAMILY HISTORY:    SOCIAL HISTORY: negative for tobacco, alcohol, or ilicit drug use.    Allergies    No Known Allergies    GEN: obese, pleasant, cooperative, multiple skin painful, dark, nodular lesions, v- neck erythematous lesions, nail nodular and purulent lesions    NEURO:   MENTAL STATUS: AAOx3  LANG/SPEECH: Fluent, intact naming, repetition & comprehension  CRANIAL NERVES:  II: Pupils equal and reactive, no RAPD, normal visual field and fundus  III, IV, VI: EOM intact, no gaze preference or deviation  V: normal  VII: no facial asymmetry  VIII: normal hearing to speech  MOTOR: 5/5 in both upper and lower extremities. Tone normal  REFLEXES: 0/4 throughout, bilateral flexor plantars  SENSORY: Reduced to touch, temperature & pin prick in all four extremities with a gloves and socks pattern  COORD: Normal finger to nose and heel to shin, no tremor, no dysmetria  BALANCE AND GAIT: Non-assessed    OSTEOMUSCULAR: Tenderness to palpation in muscles in head and all four extremities    LABS:                        17.3   11.62 )-----------( 103      ( 10 Sep 2023 07:12 )             52.0     09-10    128<L>  |  96  |  35<H>  ----------------------------<  125<H>  SEE NOTE   |  23  |  0.74    Ca    9.0      10 Sep 2023 07:12  Phos  2.5     09-10  Mg     2.0     -10    TPro  6.4  /  Alb  3.3  /  TBili  0.7  /  DBili  x   /  AST  37  /  ALT  87<H>  /  AlkPhos  72  09-09    Hemoglobin A1C:   Vitamin B12     CAPILLARY BLOOD GLUCOSE      POCT Blood Glucose.: 173 mg/dL (10 Sep 2023 09:14)      Urinalysis Basic - ( 10 Sep 2023 07:12 )    Color: x / Appearance: x / SG: x / pH: x  Gluc: 125 mg/dL / Ketone: x  / Bili: x / Urobili: x   Blood: x / Protein: x / Nitrite: x   Leuk Esterase: x / RBC: x / WBC x   Sq Epi: x / Non Sq Epi: x / Bacteria: x            Microbiology:      RADIOLOGY, EKG AND ADDITIONAL TESTS: Reviewed.

## 2023-09-10 NOTE — CONSULT NOTE ADULT - ATTENDING COMMENTS
Patient was seen and examined at bedside on 9/10/2023 at 830 am. Patient reports that she feels much worse today, she feels extremely tired with any movement. Reports that she was extremely SOB prior to me seeing her when walking which persisted when she went back to bed and was at rest. Denies dizziness, abdominal pain, N/V. ROS is otherwise negative. Vitals, labwork and pertinent imaging reviewed. Physical exam - NAD, AAO x 4, PERRLA, EOMI, supple neck, chest - CTA b/l, CV - rrr, s1s2, no m/r/g, abd - soft, + BS, ext - WWP, psych - normal affect, skin - no rash, back - midline    Plan  -Currently on Medrol, Protonix 40 mg PO qd, Cellcept  -S/p 5 day course of CTX given + cultures (H influenza)  -Given increasing oxygen requirements, symptoms and mild distress will call ICU consult  -C/w Metformin given increased FS - fairly well controlled; pt w/ CGM   -Hyponatremia suspect SIADH, urine lytes c./w this  -Neuro consult given profound weakness  -Check EKG, troponin  -Check RVP, BCx I was physically present for the key portions of the evaluation and managemnent (E/M) service provided.  I agree with the above history, physical, and plan which I have reviewed and edited where appropriate, with the exceptions as per my note.    aox3 normally conversant  cn intact  5/5 throughout except 4+ in both triceps and 4+ in both HF.  sens intact to LT, PP, VBS  dtrs absent throughout  ftn and karly intact    AP: unclear etiology of patient's complaints of "burning" within arms and legs. she denies that this is a sensation of burning on the skin and feels it deeper, but it's unclear if this signifies myalgia. this discomfort has been limiting her activities. she also reports heaviness of all 4. Neuro exam with some proximal weakness bilaterally and depressed reflexes. given she reports that the heaviness predates steroids, a steroid myopathy is not likely. more likely is a neuropathy or myopathy that is related to her underlying rheumatological condition that is currently being treated. CPKs have been normal on 3 separate occasions which makes myopathy less likely. a small fiber neuropathy is certainly possible.    - management as per primary team  - pain control if not tolerable  - f/u with Dr. Marie or Dr. Urbano for EMG as outpatient.

## 2023-09-10 NOTE — PROGRESS NOTE ADULT - PROBLEM SELECTOR PLAN 4
Unclear etiology, likely myositis since pt has been having worsening weakness with movement of extremities  Plan:  - Neurology consulted, f/u recs

## 2023-09-10 NOTE — CONSULT NOTE ADULT - ASSESSMENT
**INCOMPLETE**    62 year old female with a past medical history of afib, and sciatica, who presented to [facility] for shortness of breath found to be hypoxic and have interstitial lung disease, admitted for AHRF, further ILD workup and management; Respiratory status improving and medically stable.    Plan  -Currently on Medrol, Protonix 40 mg PO qd, Cellcept  -S/p 5 day course of CTX given + cultures (H influenza)  -Given increasing oxygen requirements, symptoms and mild distress will call ICU consult  -C/w Metformin given increased FS - fairly well controlled; pt w/ CGM   -Hyponatremia suspect SIADH, urine lytes c./w this  -Neuro consult given profound weakness  -Check EKG, troponin  -Check RVP, BCx.  **INCOMPLETE**    62 year old female with a past medical history of afib, and sciatica, who presented to [facility] for shortness of breath found to be hypoxic and have interstitial lung disease, admitted for AHRF, further ILD workup and management; Respiratory status improving and medically stable. Prot-alb gradient positive. RNP positive titers.     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 R/o systemic disorders (dermatomyositis/polymyositis, erythematous lupus, sarcoidosis, systemic sclerosis, paraneoplastic...)     Plan  -Continue management by primary team  -Correct hyponatremia  -Order folate/vit B, TSH, ARIANA, Ab (anti-Jennifer, anti-Mi2, SRP, TIF-1-gamma, NXP-2, MDA-5, anti-ssDNA, anti-Alejandro, paraneoplastic panel), ECA, vitamin D, PTH,   -Repeat CK  -Order NCS/EMG  -Please contact back to general neurology if any worsening or new neurological symptoms **INCOMPLETE**    62 year old female with a past medical history of afib, and sciatica, who presented to [facility] for shortness of breath found to be hypoxic and have interstitial lung disease, admitted for AHRF, further ILD workup and management; Respiratory status improving and medically stable. Prot-alb gradient, ARIANA. RNP, SM, ssDNA, DUSTIN, atypical ANCA positive titers.     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 Associated with possible lupus erythematosus with positive anti Alejandro and anti ssDNA and/or mixed connective tissue disease (MCTD) with positive anti RNP. R/o systemic disorders (dermatomyositis/polymyositis, erythematous lupus, sarcoidosis, systemic sclerosis, mixed connective tissue disease (MCTD), porphyria, paraneoplastic...)     Plan  -Continue management by primary team  -Correct hyponatremia  -Order folate/vit B1-B6-B12 and E, ARIANA, Ab (anti-Jennifer, anti-Mi2, SRP, TIF-1-gamma, NXP-2, MDA-5, anti-ssDNA, anti-Alejandro, paraneoplastic panel), ECA, vitamin D, PTH, porphyrins   -Repeat CK  -Order NCS/EMG  -Follow up positive results and comanagement with rheumatology  -Please contact back to general neurology if any worsening or new neurological symptoms 62 year old female with a past medical history of afib, and sciatica, who presented to [facility] for shortness of breath found to be hypoxic and have interstitial lung disease, admitted for AHRF, further ILD workup and management; Respiratory status improving and medically stable. Prot-alb gradient, ARIANA. RNP, SM, ssDNA, DUSTIN, atypical ANCA positive titers. Reviewed her MRI C-spine: moderate to severe spinal stenosis at C3-4, C4-5 level , but no cord signal. Neural foraminal stenosis left at C3-4 level, and bilateral at C4-5 level. Her sensory distribution and presence of allodynia seems to be more likely due to non-length dependent small fiber neuropathy, which is very much possible with her concomitant autoimmune disease. But will also rule out Carpal tunnel syndrome ( unlikely as Tinel And Phalen sign: negative)    Today Neurology Team was consulted for weakness. Patient was evaluated on 8/30 by us after being consulted for pain and numbness of upper extremities. When evaluated patient does not present any weakness in the neuro exam but patient does have myalgias and mild sensory polyneuropathy with hypoesthesia.     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 Associated with possible lupus erythematosus with positive anti Alejandro and anti ssDNA and/or mixed connective tissue disease (MCTD) with positive anti RNP (erythematous lupus?, mixed connective tissue disease (MCTD)?)     Plan  -Continue management by primary team  -Follow previous neuro recs from 8/30  -Could increase gabapentin for pain relieve  -Correct hyponatremia  -Order folate/vit B1-B6-B12 and E  -Follow up positive results and comanagement with rheumatology  -Physical therapy.  -Can follow up with Dr. Dipak Carroll upon discharge . Contact: [phone]  -Neurology will sign off, please call the service if you have any question or concern.     Case discussed with Neurology attending Dr. Urbano 62 year old female with a past medical history of afib, and sciatica, who presented to [facility] for shortness of breath found to be hypoxic and have interstitial lung disease, admitted for AHRF, further ILD workup and management; Respiratory status improving and medically stable. Prot-alb gradient, ARIANA. RNP, SM, ssDNA, DUSTIN, atypical ANCA positive titers. Reviewed her MRI C-spine: moderate to severe spinal stenosis at C3-4, C4-5 level , but no cord signal. Neural foraminal stenosis left at C3-4 level, and bilateral at C4-5 level. Her sensory distribution and presence of allodynia seems to be more likely due to non-length dependent small fiber neuropathy, which is very much possible with her concomitant autoimmune disease. But will also rule out Carpal tunnel syndrome ( unlikely as Tinel And Phalen sign: negative)    Today Neurology Team was consulted for weakness. Patient was evaluated on 8/30 by us after being consulted for pain and numbness of upper extremities. When evaluated patient does not present any weakness in the neuro exam but patient does have myalgias and mild sensory polyneuropathy with hypoesthesia.     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 Associated with possible lupus erythematosus with positive anti Alejandro and anti ssDNA and/or mixed connective tissue disease (MCTD) with positive anti RNP (erythematous lupus?, mixed connective tissue disease (MCTD)?)     Plan  -Continue management by primary team  -Follow previous neuro recs from 8/30  -Could increase gabapentin for pain relieve  -Correct hyponatremia  -Can reorder CK  -Order folate/vit B1-B6-B12 and E  -Follow up positive results and comanagement with rheumatology  -Physical therapy.  -Can follow up with Dr. Dipak Carroll upon discharge . Contact: [phone]  -Neurology will sign off, please call the service if you have any question or concern.     Case discussed with Neurology attending Dr. Urbano 62 year old female with a past medical history of afib, and sciatica, who presented to [facility] for shortness of breath found to be hypoxic and have interstitial lung disease, admitted for AHRF, further ILD workup and management; Respiratory status improving and medically stable. Prot-alb gradient, ARIANA. RNP, SM, ssDNA, DUSTIN, atypical ANCA positive titers. Reviewed her MRI C-spine: moderate to severe spinal stenosis at C3-4, C4-5 level , but no cord signal. Neural foraminal stenosis left at C3-4 level, and bilateral at C4-5 level. Her sensory distribution and presence of allodynia seems to be more likely due to non-length dependent small fiber neuropathy, which is very much possible with her concomitant autoimmune disease. But will also rule out Carpal tunnel syndrome ( unlikely as Tinel And Phalen sign: negative)    Today Neurology Team was consulted for weakness. Patient was evaluated on 8/30 by us after being consulted for pain and numbness of upper extremities. When evaluated patient does not present any weakness in the neuro exam but patient does have myalgias and mild sensory polyneuropathy with hypoesthesia.     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 Associated with possible lupus erythematosus with positive anti Alejandro and anti ssDNA and/or mixed connective tissue disease (MCTD) with positive anti RNP (erythematous lupus?, mixed connective tissue disease (MCTD)?)     Plan  -Continue management by primary team and rheum  -Follow previous neuro recs from 8/30  -Could increase gabapentin for pain relief, but pt is not interested in this at this time - she reports the sensation is tolerable from a discomfort standpoint, but it is limiting her function.  - reorder CK  -Order folate/vit B1-B6-B12 and E  -Follow up positive results and comanagement with rheumatology  -Physical therapy.  - follow up with Dr. Nik Marie or Dante Urbano for EMG upon discharge. Contact: [phone]  -Neurology will sign off, please call the service if you have any question or concern.     Case discussed with Neurology attending Dr. Kincaid

## 2023-09-10 NOTE — PROGRESS NOTE ADULT - PROBLEM SELECTOR PLAN 1
STABLE.   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 Hb and Hct elevated in setting of chronic sob. CXR revealed ground glass opacity. CTA revealed interstitial lung disease.  Infectious workup negative so far. Hypersensitivity pneumonitis panel wnl. stable. Ambulatory sats have been stable around high 80s/ low 90s with quick recovery to baseline. s/p solu-medrol 60mg course & tapering, now transitioned to PO Methylprednisolone 32mg and started on Cellecept 500mg Q12 on 9/9  Plan:  - c/w Methyprednisolone PO 32mg   - c/w Cellecept 500mg Q12Hrs, continue for 3 days and then double dose to 1000mg until pt sees dr Whitney   - F/u Pulm recommendations   - continue to monitor sxs and obtain ambulatory sats.  - RVP pending

## 2023-09-11 LAB
ANION GAP SERPL CALC-SCNC: 11 MMOL/L — SIGNIFICANT CHANGE UP (ref 5–17)
BUN SERPL-MCNC: 38 MG/DL — HIGH (ref 7–23)
CALCIUM SERPL-MCNC: 8.9 MG/DL — SIGNIFICANT CHANGE UP (ref 8.4–10.5)
CHLORIDE SERPL-SCNC: 96 MMOL/L — SIGNIFICANT CHANGE UP (ref 96–108)
CK SERPL-CCNC: 43 U/L — SIGNIFICANT CHANGE UP (ref 25–170)
CO2 SERPL-SCNC: 22 MMOL/L — SIGNIFICANT CHANGE UP (ref 22–31)
CREAT SERPL-MCNC: 0.82 MG/DL — SIGNIFICANT CHANGE UP (ref 0.5–1.3)
EGFR: 80 ML/MIN/1.73M2 — SIGNIFICANT CHANGE UP
GLUCOSE BLDC GLUCOMTR-MCNC: 141 MG/DL — HIGH (ref 70–99)
GLUCOSE BLDC GLUCOMTR-MCNC: 143 MG/DL — HIGH (ref 70–99)
GLUCOSE BLDC GLUCOMTR-MCNC: 165 MG/DL — HIGH (ref 70–99)
GLUCOSE BLDC GLUCOMTR-MCNC: 165 MG/DL — HIGH (ref 70–99)
GLUCOSE BLDC GLUCOMTR-MCNC: 223 MG/DL — HIGH (ref 70–99)
GLUCOSE SERPL-MCNC: 141 MG/DL — HIGH (ref 70–99)
HCT VFR BLD CALC: 49 % — HIGH (ref 34.5–45)
HGB BLD-MCNC: 16.6 G/DL — HIGH (ref 11.5–15.5)
MAGNESIUM SERPL-MCNC: 2.2 MG/DL — SIGNIFICANT CHANGE UP (ref 1.6–2.6)
MCHC RBC-ENTMCNC: 30.6 PG — SIGNIFICANT CHANGE UP (ref 27–34)
MCHC RBC-ENTMCNC: 33.9 GM/DL — SIGNIFICANT CHANGE UP (ref 32–36)
MCV RBC AUTO: 90.4 FL — SIGNIFICANT CHANGE UP (ref 80–100)
NRBC # BLD: 0 /100 WBCS — SIGNIFICANT CHANGE UP (ref 0–0)
PHOSPHATE SERPL-MCNC: 3.2 MG/DL — SIGNIFICANT CHANGE UP (ref 2.5–4.5)
PLATELET # BLD AUTO: 108 K/UL — LOW (ref 150–400)
POTASSIUM SERPL-MCNC: 4.5 MMOL/L — SIGNIFICANT CHANGE UP (ref 3.5–5.3)
POTASSIUM SERPL-SCNC: 4.5 MMOL/L — SIGNIFICANT CHANGE UP (ref 3.5–5.3)
RBC # BLD: 5.42 M/UL — HIGH (ref 3.8–5.2)
RBC # FLD: 14.6 % — HIGH (ref 10.3–14.5)
SODIUM SERPL-SCNC: 129 MMOL/L — LOW (ref 135–145)
TROPONIN T, HIGH SENSITIVITY RESULT: 91 NG/L — CRITICAL HIGH (ref 0–51)
WBC # BLD: 12.6 K/UL — HIGH (ref 3.8–10.5)
WBC # FLD AUTO: 12.6 K/UL — HIGH (ref 3.8–10.5)

## 2023-09-11 PROCEDURE — 99233 SBSQ HOSP IP/OBS HIGH 50: CPT | Mod: GC

## 2023-09-11 PROCEDURE — 71275 CT ANGIOGRAPHY CHEST: CPT | Mod: 26

## 2023-09-11 PROCEDURE — 99233 SBSQ HOSP IP/OBS HIGH 50: CPT

## 2023-09-11 PROCEDURE — 93010 ELECTROCARDIOGRAM REPORT: CPT

## 2023-09-11 PROCEDURE — 99232 SBSQ HOSP IP/OBS MODERATE 35: CPT | Mod: GC

## 2023-09-11 PROCEDURE — 93970 EXTREMITY STUDY: CPT | Mod: 26

## 2023-09-11 RX ORDER — ACETAMINOPHEN 500 MG
1000 TABLET ORAL ONCE
Refills: 0 | Status: COMPLETED | OUTPATIENT
Start: 2023-09-11 | End: 2023-09-11

## 2023-09-11 RX ORDER — MYCOPHENOLATE MOFETIL 250 MG/1
500 CAPSULE ORAL EVERY 24 HOURS
Refills: 0 | Status: DISCONTINUED | OUTPATIENT
Start: 2023-09-11 | End: 2023-09-11

## 2023-09-11 RX ORDER — METOPROLOL TARTRATE 50 MG
25 TABLET ORAL EVERY 8 HOURS
Refills: 0 | Status: DISCONTINUED | OUTPATIENT
Start: 2023-09-11 | End: 2023-09-11

## 2023-09-11 RX ORDER — SODIUM CHLORIDE 9 MG/ML
1000 INJECTION INTRAMUSCULAR; INTRAVENOUS; SUBCUTANEOUS ONCE
Refills: 0 | Status: COMPLETED | OUTPATIENT
Start: 2023-09-11 | End: 2023-09-11

## 2023-09-11 RX ORDER — METOPROLOL TARTRATE 50 MG
5 TABLET ORAL ONCE
Refills: 0 | Status: COMPLETED | OUTPATIENT
Start: 2023-09-11 | End: 2023-09-11

## 2023-09-11 RX ORDER — SODIUM CHLORIDE 9 MG/ML
500 INJECTION INTRAMUSCULAR; INTRAVENOUS; SUBCUTANEOUS
Refills: 0 | Status: DISCONTINUED | OUTPATIENT
Start: 2023-09-11 | End: 2023-09-11

## 2023-09-11 RX ORDER — METOPROLOL TARTRATE 50 MG
2.5 TABLET ORAL ONCE
Refills: 0 | Status: COMPLETED | OUTPATIENT
Start: 2023-09-11 | End: 2023-09-11

## 2023-09-11 RX ORDER — SODIUM CHLORIDE 9 MG/ML
500 INJECTION INTRAMUSCULAR; INTRAVENOUS; SUBCUTANEOUS ONCE
Refills: 0 | Status: COMPLETED | OUTPATIENT
Start: 2023-09-11 | End: 2023-09-11

## 2023-09-11 RX ORDER — METOPROLOL TARTRATE 50 MG
2.5 TABLET ORAL ONCE
Refills: 0 | Status: DISCONTINUED | OUTPATIENT
Start: 2023-09-11 | End: 2023-09-11

## 2023-09-11 RX ORDER — METOPROLOL TARTRATE 50 MG
12.5 TABLET ORAL EVERY 8 HOURS
Refills: 0 | Status: DISCONTINUED | OUTPATIENT
Start: 2023-09-11 | End: 2023-09-13

## 2023-09-11 RX ORDER — SODIUM CHLORIDE 9 MG/ML
1500 INJECTION INTRAMUSCULAR; INTRAVENOUS; SUBCUTANEOUS ONCE
Refills: 0 | Status: COMPLETED | OUTPATIENT
Start: 2023-09-11 | End: 2023-09-11

## 2023-09-11 RX ORDER — SODIUM CHLORIDE 9 MG/ML
1 INJECTION INTRAMUSCULAR; INTRAVENOUS; SUBCUTANEOUS THREE TIMES A DAY
Refills: 0 | Status: DISCONTINUED | OUTPATIENT
Start: 2023-09-11 | End: 2023-09-12

## 2023-09-11 RX ADMIN — SODIUM CHLORIDE 500 MILLILITER(S): 9 INJECTION INTRAMUSCULAR; INTRAVENOUS; SUBCUTANEOUS at 19:02

## 2023-09-11 RX ADMIN — METFORMIN HYDROCHLORIDE 500 MILLIGRAM(S): 850 TABLET ORAL at 06:29

## 2023-09-11 RX ADMIN — PANTOPRAZOLE SODIUM 40 MILLIGRAM(S): 20 TABLET, DELAYED RELEASE ORAL at 06:29

## 2023-09-11 RX ADMIN — ENOXAPARIN SODIUM 40 MILLIGRAM(S): 100 INJECTION SUBCUTANEOUS at 06:28

## 2023-09-11 RX ADMIN — GABAPENTIN 300 MILLIGRAM(S): 400 CAPSULE ORAL at 06:29

## 2023-09-11 RX ADMIN — Medication 32 MILLIGRAM(S): at 06:28

## 2023-09-11 RX ADMIN — SODIUM CHLORIDE 1 GRAM(S): 9 INJECTION INTRAMUSCULAR; INTRAVENOUS; SUBCUTANEOUS at 13:48

## 2023-09-11 RX ADMIN — Medication 2.5 MILLIGRAM(S): at 05:51

## 2023-09-11 RX ADMIN — GABAPENTIN 300 MILLIGRAM(S): 400 CAPSULE ORAL at 13:48

## 2023-09-11 RX ADMIN — Medication 5 MILLIGRAM(S): at 06:09

## 2023-09-11 RX ADMIN — Medication 1 TABLET(S): at 13:48

## 2023-09-11 RX ADMIN — GABAPENTIN 300 MILLIGRAM(S): 400 CAPSULE ORAL at 22:02

## 2023-09-11 RX ADMIN — SODIUM CHLORIDE 1000 MILLILITER(S): 9 INJECTION INTRAMUSCULAR; INTRAVENOUS; SUBCUTANEOUS at 04:50

## 2023-09-11 RX ADMIN — SODIUM CHLORIDE 1500 MILLILITER(S): 9 INJECTION INTRAMUSCULAR; INTRAVENOUS; SUBCUTANEOUS at 20:47

## 2023-09-11 RX ADMIN — METFORMIN HYDROCHLORIDE 500 MILLIGRAM(S): 850 TABLET ORAL at 18:32

## 2023-09-11 RX ADMIN — Medication 5 MILLIGRAM(S): at 16:50

## 2023-09-11 RX ADMIN — Medication 2.5 MILLIGRAM(S): at 05:23

## 2023-09-11 RX ADMIN — ENOXAPARIN SODIUM 40 MILLIGRAM(S): 100 INJECTION SUBCUTANEOUS at 18:26

## 2023-09-11 RX ADMIN — POLYETHYLENE GLYCOL 3350 17 GRAM(S): 17 POWDER, FOR SOLUTION ORAL at 13:49

## 2023-09-11 RX ADMIN — SODIUM CHLORIDE 1 GRAM(S): 9 INJECTION INTRAMUSCULAR; INTRAVENOUS; SUBCUTANEOUS at 22:02

## 2023-09-11 NOTE — PROGRESS NOTE ADULT - ASSESSMENT
62 year old female with a past medical history of afib, and sciatica, who presented to [facility] for shortness of breath found to be hypoxic and have interstitial lung disease, admitted for AHRF, further ILD workup and management; Respiratory status improving and medically stable.

## 2023-09-11 NOTE — PROGRESS NOTE ADULT - SUBJECTIVE AND OBJECTIVE BOX
PULMONARY CONSULT SERVICE FOLLOW-UP NOTE    INTERVAL HPI:  Reviewed chart and overnight events; patient seen and examined at bedside. She says she feels well again after the morning episode resolved. She feels the Cellcept is what causes her these episodes of tachycardia and fever.     MEDICATIONS:  Pulmonary:    Antimicrobials:  trimethoprim  160 mG/sulfamethoxazole 800 mG 1 Tablet(s) Oral daily    Anticoagulants:  enoxaparin Injectable 40 milliGRAM(s) SubCutaneous every 12 hours    Cardiac:  metoprolol tartrate 25 milliGRAM(s) Oral every 8 hours      Allergies    No Known Allergies    Intolerances        Vital Signs Last 24 Hrs  T(C): 36.7 (11 Sep 2023 12:52), Max: 36.7 (10 Sep 2023 21:54)  T(F): 98.1 (11 Sep 2023 12:52), Max: 98.1 (11 Sep 2023 12:52)  HR: 116 (11 Sep 2023 12:52) (85 - 136)  BP: 104/72 (11 Sep 2023 12:52) (89/54 - 114/74)  BP(mean): --  RR: 18 (11 Sep 2023 12:52) (18 - 24)  SpO2: 98% (11 Sep 2023 12:52) (89% - 98%)    Parameters below as of 11 Sep 2023 12:52  Patient On (Oxygen Delivery Method): nasal cannula  O2 Flow (L/min): 4      PHYSICAL EXAM:  Constitutional: WD  HEENT: NC/AT; PERRL, anicteric sclera; MMM  Neck: supple  Lymph: No supraclavical LAD or cervical chain LAD  Cardiovascular: +S1/S2, RRR  Respiratory: CTA B/L; no W/R/R  Gastrointestinal: soft, NT/ND  Extremities: WWP; no edema, clubbing or cyanosis  Vascular: 2+ radial and pedal pulses  Neurological: AAOx3; no focal deficits    LABS:      CBC Full  -  ( 11 Sep 2023 05:30 )  WBC Count : 12.60 K/uL  RBC Count : 5.42 M/uL  Hemoglobin : 16.6 g/dL  Hematocrit : 49.0 %  Platelet Count - Automated : 108 K/uL  Mean Cell Volume : 90.4 fl  Mean Cell Hemoglobin : 30.6 pg  Mean Cell Hemoglobin Concentration : 33.9 gm/dL  Auto Neutrophil # : x  Auto Lymphocyte # : x  Auto Monocyte # : x  Auto Eosinophil # : x  Auto Basophil # : x  Auto Neutrophil % : x  Auto Lymphocyte % : x  Auto Monocyte % : x  Auto Eosinophil % : x  Auto Basophil % : x    09-11    129<L>  |  96  |  38<H>  ----------------------------<  141<H>  4.5   |  22  |  0.82    Ca    8.9      11 Sep 2023 05:30  Phos  3.2     09-11  Mg     2.2     09-11            Urinalysis Basic - ( 11 Sep 2023 05:30 )    Color: x / Appearance: x / SG: x / pH: x  Gluc: 141 mg/dL / Ketone: x  / Bili: x / Urobili: x   Blood: x / Protein: x / Nitrite: x   Leuk Esterase: x / RBC: x / WBC x   Sq Epi: x / Non Sq Epi: x / Bacteria: x    RADIOLOGY & ADDITIONAL STUDIES:    < from: CT Angio Chest PE Protocol w/ IV Cont (09.11.23 @ 13:45) >  ACC: [id number] EXAM:  CT ANGIO CHEST PULM ART WAWIC   ORDERED BY: DELFINO DRISCOLL     PROCEDURE DATE:  09/11/2023          INTERPRETATION:  CLINICAL INFORMATION: New tachycardia, desaturation    COMPARISON: 8/28/2023    CONTRAST/COMPLICATIONS:  IV Contrast: Isovue 370  90 cc administered   10 cc discarded  Oral Contrast: NONE  Complications: None reported at time of study completion    PROCEDURE:  CT Angiography of the Chest.  Sagittal and coronal reformats were performed as well as 3D (MIP)   reconstructions.    FINDINGS:    LUNGS AND AIRWAYS: Patent central airways.  Bilateral mild groundglass   opacities and increased reticular markings/opacities overall   decreased/improved compared to the previous study. Extending from the   apices to thelung bases and both peripheral and central. No   honeycombing.A new small focus of consolidative changes seen in the   lingula. Otherwise, no new areas of lung disease  PLEURA: No pleural effusion.  MEDIASTINUM AND NIRMALA: No lymphadenopathy.  VESSELS:No pulmonary embolism. No thoracic aortic dissection or aneurysm.  HEART: Heart size is normal. No pericardial effusion.  CHEST WALL AND LOWER NECK: Within normal limits.  VISUALIZED UPPER ABDOMEN: Small hiatal hernia  BONES: Small low-attenuation focus noted in the caudate lobe, stable,   likely a small cyst.    IMPRESSION:  1.  Improved/decreased extent of bilateral groundglass opacities and   reticular markings compared to the previous study. Findings are   nonspecific but differential etiologies include interstitial pneumonia,   drug toxicity, nonspecific connective tissue disorders.  2.  New small focus of parenchymal consolidation seen in the lingula;   possible small focal pneumonia.  3.  No pulmonary embolism      < end of copied text >

## 2023-09-11 NOTE — PROGRESS NOTE ADULT - PROBLEM SELECTOR PLAN 5
Na 128 this AM. likely SIADH i/s/o nausea, weakness  Serum Na pending   Plan:   - f/u Urine Lytes and serum osmolarity Na 129 9/11. etiology unclear    - f/u Urine Lytes and serum osmolarity and correlate w/ am lab 9/12.

## 2023-09-11 NOTE — PROGRESS NOTE ADULT - PROBLEM SELECTOR PLAN 2
ARIANA (1:1280 speckled), anti-smith (positive), complement levels (C3 normal, C4 low), anti RNP (elevated; >8)  SARS COV-2 spike protein antibody positive; CRP and ESR elevated    Hypersensitivity pneumonitis panel negative  Uploaded prior CT chest from 6/2023 that looks essentially normal.  TTE wnl    -Increase to PO methylprednisolone 40 mg PO qd    -PT evaluation and document ambulatory saturation if possible; will likely require establishment of home oxygen prior to discharge  -Nutrition counseling and weight loss  -Will require dedicated outpatient pulmonary follow up with Dr. Whitney in 2 weeks from discharge with formal PFTs and a 6MWT.  -Repeat CT chest in 6 weeks.  -Will need to walk with suppl O2 prior to discharge

## 2023-09-11 NOTE — PROGRESS NOTE ADULT - PROBLEM SELECTOR PLAN 2
Unclear etiology, likely myositis since pt has been having worsening weakness with movement of extremities vs Cellcept s/e  Plan:  - Neurology consulted, f/u recs Pt states she had afib3 month ago and took 1 month of toprol.  pt has episodes of afib 9/10 o/n and returned to sinus s/p Lopressor 2.5 and 5. PE negative on CT angio. Denies chest pain, palpitation. Cardiology consulted. CHADVASC 1 (female)    - Lovenox AC for DVT  - f/u US b/l LE     Per card:  - started Lopressor 25mg PO q8hrs as patient is at risk for recurrent AF iso hypoxia   - would monitor fever trend given Tmax of 100.2, possible infection that could be provoking recurrent Afib  - f/u with EP/cardiology out-patient (Dr. Cadena)

## 2023-09-11 NOTE — PROGRESS NOTE ADULT - ASSESSMENT
The patient is a 62 year old female with a past medical history of afib, and sciatica, who presented to [facility] for shortness of breath. Being treated for ILD. ICU consulted i/s/o A-fib with RVR.     #A-fib w/ RVR   Pt noted to have HRs in 140s-160s while on regional medical floors, decreased to 130s and sustained throughout day. Pt states she believes her sx are attributed to Cellcept which she began receiving 9/9. Pt afebrile TMax of 100.2 9/10. Pt denies sx at this time including but not equal to CP, SOB, dizziness, palpitations. Electrolytes on recent blood work WNL. MAPs >80 on bedside monitor, though still tachycardic to 133, BP of 106/62. Unclear connection between Mycophenolate and tachycardia, as the incidence of irregular tachycardia has not been thoroughly reported/studied.   Plan:   - Per Cardiology, pt should begin Metoprolol tartrate 25mg q8h, last received 5mg IVP @ 0500 and 0600 this AM.   The patient is a 62 year old female with a past medical history of afib, and sciatica, who presented to [facility] for shortness of breath. Being treated for ILD. ICU consulted i/s/o A-fib with RVR.     #A-fib w/ RVR   Pt noted to have HRs in 140s-160s while on regional medical floors, decreased to 130s and sustained throughout day. Pt states she believes her sx are attributed to Cellcept which she began receiving 9/9. Pt afebrile TMax of 100.2 9/10. Pt denies sx at this time including but not equal to CP, SOB, dizziness, palpitations. Electrolytes on recent blood work WNL. MAPs >80 on bedside monitor, though still tachycardic to 133, BP of 106/62. Unclear connection between Mycophenolate and tachycardia, as the incidence of irregular tachycardia has not been thoroughly reported/studied.   Plan:   - Per Cardiology, pt should begin Metoprolol tartrate 25mg q8h, last received 5mg IVP @ 0500 and 0600 this AM.  - We recommend fluid resuscitation for isolated hypotensive episode of 106/62 prior to resuming B-Blocker   - We recommend continual vital checks overnight to assess for HR   - Pt declined any gestures at upgrading pt for cardiac monitoring, stated she "feels fine"    Thank you for this consult. Discussed with Intensivist Dr. Thacker   Recommendations are final after attending attestation.

## 2023-09-11 NOTE — CONSULT NOTE ADULT - SUBJECTIVE AND OBJECTIVE BOX
ALIZA FOLEY, 62y, Female  MRN-[id number]    ****ICU CONSULT NOTE****  HPI: The patient is a 62 year old female with a past medical history of afib, and sciatica, who presented to [facility] for shortness of breath. Pt stated that she went to the urgent care prior to admission where she had an XR concerning for b/l lower infiltrates. She stated that she could only walk 12-15 feet before feeling short of breath. She stated that she has a pulse ox at home showing readings of 82%. She stated that she had chronic SOB for several months and got worked up in [place] where she had a CT scan that was negative for PE. She also stated that she was seen by pulmonology and rheumatology, where they ruled out lupus and other immunological disorders. Pt states that she has a cough with no sputum. Pt denies any uri sxs, chest pain, pain with breathing, abdominal pain, nvd, headaches. While at [facility] patient was underwent bronchoscopy with BAL and biopsy and pulse steroids. ICU consulted i/s/o elevated HR.     Pt seen/examined at bedside. Pt AAOx3 and conversing fully. Pt denying chest pain, cough, SOB, abdominal pain, n/v. Pt stating that she had a similar a-fib RVR episode on 9/10 after reciving Cellcept and that she did not want to continue on that medication.     12 Point ROS Negative unless noted otherwise above.  -------------------------------------------------------------------------------  VITAL SIGNS:  Vital Signs Last 24 Hrs  T(C): 36.5 (11 Sep 2023 04:20), Max: 37.9 (10 Sep 2023 09:06)  T(F): 97.7 (11 Sep 2023 04:20), Max: 100.2 (10 Sep 2023 09:06)  HR: 126 (11 Sep 2023 04:20) (85 - 126)  BP: 89/54 (11 Sep 2023 04:20) (89/54 - 132/91)  BP(mean): --  RR: 20 (11 Sep 2023 04:36) (18 - 24)  SpO2: 94% (11 Sep 2023 04:36) (89% - 95%)    Parameters below as of 11 Sep 2023 04:36  Patient On (Oxygen Delivery Method): nasal cannula  O2 Flow (L/min): 6    I&O's Summary      PHYSICAL EXAM:    General: NAD, well developed  HEENT: NC/AT  Neck: supple  Cardiovascular: tachycardic and irregular rhythm; +S1/S2; NO M/R/G  Respiratory: CTA B/L; no W/R/R  Gastrointestinal: soft, NT/ND; +BSx4  Extremities: WWP; no edema or cyanosis  Vascular: 2+ radial, DP/PT pulses B/L  Neurological: AAOx3; no focal deficits    ALLERGIES:  Allergies    No Known Allergies    Intolerances    MEDICATIONS:  MEDICATIONS  (STANDING):  acetaminophen   IVPB .. 1000 milliGRAM(s) IV Intermittent once  dextrose 5%. 1000 milliLiter(s) (100 mL/Hr) IV Continuous <Continuous>  dextrose 5%. 1000 milliLiter(s) (50 mL/Hr) IV Continuous <Continuous>  dextrose 50% Injectable 25 Gram(s) IV Push once  dextrose 50% Injectable 12.5 Gram(s) IV Push once  dextrose 50% Injectable 25 Gram(s) IV Push once  enoxaparin Injectable 40 milliGRAM(s) SubCutaneous every 12 hours  gabapentin 300 milliGRAM(s) Oral every 8 hours  glucagon  Injectable 1 milliGRAM(s) IntraMuscular once  insulin lispro (ADMELOG) corrective regimen sliding scale   SubCutaneous Before meals and at bedtime  metFORMIN 500 milliGRAM(s) Oral two times a day  methylPREDNISolone 32 milliGRAM(s) Oral daily  metoprolol tartrate Injectable 5 milliGRAM(s) IV Push once  mycophenolate mofetil 500 milliGRAM(s) Oral every 12 hours  pantoprazole    Tablet 40 milliGRAM(s) Oral before breakfast  polyethylene glycol 3350 17 Gram(s) Oral daily  sodium chloride 0.9% Bolus 1000 milliLiter(s) IV Bolus once  trimethoprim  160 mG/sulfamethoxazole 800 mG 1 Tablet(s) Oral daily    MEDICATIONS  (PRN):  acetaminophen     Tablet .. 650 milliGRAM(s) Oral every 6 hours PRN Temp greater or equal to 38C (100.4F), Mild Pain (1 - 3)  benzocaine/menthol Lozenge 1 Lozenge Oral every 4 hours PRN Sore Throat  dextrose Oral Gel 15 Gram(s) Oral once PRN Blood Glucose LESS THAN 70 milliGRAM(s)/deciliter      -------------------------------------------------------------------------------  LABS:                        17.3   11.62 )-----------( 103      ( 10 Sep 2023 07:12 )             52.0     09-10    128<L>  |  95<L>  |  35<H>  ----------------------------<  202<H>  5.2   |  21<L>  |  0.91    Ca    9.1      10 Sep 2023 14:00  Phos  2.5     09-10  Mg     2.0     09-10    TPro  6.4  /  Alb  3.3  /  TBili  0.7  /  DBili  x   /  AST  37  /  ALT  87<H>  /  AlkPhos  72  09-09    LIVER FUNCTIONS - ( 09 Sep 2023 06:44 )  Alb: 3.3 g/dL / Pro: 6.4 g/dL / ALK PHOS: 72 U/L / ALT: 87 U/L / AST: 37 U/L / GGT: x             Urinalysis Basic - ( 10 Sep 2023 14:00 )    Color: x / Appearance: x / SG: x / pH: x  Gluc: 202 mg/dL / Ketone: x  / Bili: x / Urobili: x   Blood: x / Protein: x / Nitrite: x   Leuk Esterase: x / RBC: x / WBC x   Sq Epi: x / Non Sq Epi: x / Bacteria: x      CAPILLARY BLOOD GLUCOSE      POCT Blood Glucose.: 155 mg/dL (10 Sep 2023 23:11)      SARS-CoV-2: NotDetec (10 Sep 2023 11:24)  SARS-CoV-2: NotDetec (26 Aug 2023 12:55)      RADIOLOGY & ADDITIONAL TESTS: Reviewed.

## 2023-09-11 NOTE — CONSULT NOTE ADULT - CONSULT REQUESTED DATE/TIME
30-Aug-2023 15:29
10-Sep-2023 11:18
29-Aug-2023
29-Aug-2023 06:20
07-Sep-2023 17:09
11-Sep-2023 05:57
27-Aug-2023 11:36
27-Aug-2023 11:41

## 2023-09-11 NOTE — CHART NOTE - NSCHARTNOTEFT_GEN_A_CORE
At 430am, tachy to 130s, BPs 89/54, satting 89% on 4L. Non-toxic, reporting dry mouth, no pain. Pt evaluated by beside, asx other than feeling her heart race when she is tachycardic. Repeat Vitals 97.7F oral, HR 110s-130s, BP 90s/60s MAP around 67. SpO2 92 on 4L. EKG demonstrates afib with RVR . 1L NS ordered. ICU consulted. Etioogy of afib currently unclear, pt afebrile with WNL rectal temperature, no recent medications that could send her into afib. Potentialy 2/2 inflammation from ILD iso downtitration of her steroids v.s. pulmonary embolism causing R heart strain. CTA PE ordered. Given lopressor IV 2.5 x2 with /70s --> 104/70s, HR ~130-140s decreased to ~110-120s. ofirmev 1g IV given. Additional lopressor IVP 5 given. At 430am, tachy to 130s, BPs 89/54, satting 89% on 4L. Non-toxic, reporting dry mouth, no pain. Pt evaluated by beside, asx other than feeling her heart race when she is tachycardic. Repeat Vitals 97.7F oral, HR 110s-130s, BP 90s/60s MAP around 67. SpO2 92 on 4L. EKG demonstrates afib with RVR . 1L NS ordered. ICU consulted. Etioogy of afib currently unclear, pt afebrile with WNL rectal temperature, no recent medications that could send her into afib. Potentialy 2/2 inflammation from ILD iso downtitration of her steroids v.s. pulmonary embolism causing R heart strain. CTA PE ordered. Given lopressor IV 2.5 x2 with /70s --> 104/70s, HR ~130-140s decreased to ~110-120s. ofirmev 1g IV given. Additional lopressor IVP 5 given. -110s, BPs 95/73. At 430am, tachy to 130s, BPs 89/54, satting 89% on 4L. Non-toxic, reporting dry mouth, no pain. Pt evaluated by beside, asx other than feeling her heart race when she is tachycardic. Repeat Vitals 97.7F oral, HR 110s-130s, BP 90s/60s MAP around 67. SpO2 92 on 4L. EKG demonstrates afib with RVR . 1L NS ordered. ICU consulted. Etioogy of afib currently unclear, pt afebrile with WNL rectal temperature, no recent medications that could send her into afib. Potentially 2/2 inflammation from ILD iso downtitration of her steroids v.s. pulmonary embolism causing R heart strain. CTA PE ordered. Given lopressor IV 2.5 x2 with /70s --> 104/70s, HR ~130-140s decreased to ~110-120s. ofirmev 1g IV given. Additional lopressor IVP 5 given. -110s, BPs 95/73 (MAP 74). At 430am, tachy to 130s, BPs 89/54, satting 89% on 4L. Non-toxic, reporting dry mouth, no pain. Pt evaluated by beside, asx other than feeling her heart race when she is tachycardic. Repeat Vitals 97.7F oral, HR 110s-130s, BP 90s/60s MAP around 67. SpO2 92 on 4L. EKG demonstrates afib with RVR . 1L NS ordered. ICU consulted. Etioogy of afib currently unclear, pt afebrile with WNL rectal temperature, no recent medications that could send her into afib. Potentially 2/2 inflammation from ILD iso downtitration of her steroids v.s. pulmonary embolism causing R heart strain. CTA PE ordered. Given lopressor IV 2.5 x2 with /70s --> 104/70s, HR ~130-140s decreased to ~110-120s. ofirmev 1g IV given. Additional lopressor IVP 5 given. -110s, BPs 95/73 (MAP 74). Consider starting oral rate control.

## 2023-09-11 NOTE — PROGRESS NOTE ADULT - PROBLEM SELECTOR PLAN 1
Improving with IV steroids and is currently on 2-3LPM NC.    Presenting in acute respiratory failure, with imaging should bilateral reticulations and ground glass opacities. Given reticular pattern, in particular in a subpleural pattern with a cranial caudal distrubution, GGOs, and arcade-like sign- this constellation of CT chest findings noted on HRCT favors mainly diagnoses of organizing pneumonia and NSIP 2/2 CTD (highly elevated anti-RNP). Patient may have certainly presented with ILD prior to rheumatological manifestations of MCTD. Her subclinical fever this AM and symptoms are unlikely to be due to Cellcept. Her CT chest is improved compared to prior. Can continue on just steroids for now.

## 2023-09-11 NOTE — PROGRESS NOTE ADULT - ATTENDING COMMENTS
ILD with persistent AF with RVR  physical as above  seen earlier  since admission note progressive hemoconcentration suggestin dehdration may be contributing to lower BP and tachycardia  give 2 liters NS; echo shows good LV/RV function  CT improved  patient defers transfer to more monitored setting

## 2023-09-11 NOTE — PROGRESS NOTE ADULT - ASSESSMENT
62 year old female with a past medical history of afib, and sciatica, who presented to [facility] for shortness of breath. Pulmonary consulted for acute hypoxic respiratory failure.     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

## 2023-09-11 NOTE — PROVIDER CONTACT NOTE (MEDICATION) - SITUATION
provider made aware of POCT glucose of 155. Patient refused insulin. Patient received metformin q12.

## 2023-09-11 NOTE — CONSULT NOTE ADULT - ASSESSMENT
The patient is a 62 year old female with a past medical history of afib, and sciatica, who presented to [facility] for shortness of breath. Being treated for ILD. ICU consulted i/s/o A-fib with RVR.     #A-fib w/ RVR   Pt noted to have HRs in 140s-160s while on regional medical floors. Rectal T 100.2, no urinary retention, no pain. Pt stating she feels as though her symptoms are related to oral Cellcept use. Pt AAOx3 with no active chest pain or cardiac symptoms. Electrolytes on recent blood work WNL. MAPs >80 on bedside monitor.   - etiology likely 2/2 possible developing infection v cellcept use  - Recommend IV Lopressor 5mg for resolution of a-fib RVR   - recommend resuming PO beta-blocker once RVR resolved  - recommend performing full med rec and determine side effects of Cellcept -- consider additional alternatives if possible        Dispo: [facility]  Discussed w/ Dr. Rouse

## 2023-09-11 NOTE — CHART NOTE - NSCHARTNOTEFT_GEN_A_CORE
Admitting Diagnosis:   Patient is a 62y old  Female who presents with a chief complaint of Shortness of breath (11 Sep 2023 05:57)      PAST MEDICAL & SURGICAL HISTORY:      Current Nutrition Order: Regular        PO Intake: Good (%) [ x ]  Fair (50-75%) [   ] Poor (<25%) [   ]    GI Issues: denies n/v/d/c/abd pain, last BM 9/9 per EMR    Skin Integrity: no PI documented, noted with +1 BL foot edema    Labs:   09-11    129<L>  |  96  |  38<H>  ----------------------------<  141<H>  4.5   |  22  |  0.82    Ca    8.9      11 Sep 2023 05:30  Phos  3.2     09-11  Mg     2.2     09-11      CAPILLARY BLOOD GLUCOSE      POCT Blood Glucose.: 223 mg/dL (11 Sep 2023 09:53)  POCT Blood Glucose.: 165 mg/dL (11 Sep 2023 06:33)  POCT Blood Glucose.: 155 mg/dL (10 Sep 2023 23:11)  POCT Blood Glucose.: 172 mg/dL (10 Sep 2023 17:56)  POCT Blood Glucose.: 234 mg/dL (10 Sep 2023 12:53)      Medications:  MEDICATIONS  (STANDING):  dextrose 5%. 1000 milliLiter(s) (100 mL/Hr) IV Continuous <Continuous>  dextrose 5%. 1000 milliLiter(s) (50 mL/Hr) IV Continuous <Continuous>  dextrose 50% Injectable 25 Gram(s) IV Push once  dextrose 50% Injectable 12.5 Gram(s) IV Push once  dextrose 50% Injectable 25 Gram(s) IV Push once  enoxaparin Injectable 40 milliGRAM(s) SubCutaneous every 12 hours  gabapentin 300 milliGRAM(s) Oral every 8 hours  glucagon  Injectable 1 milliGRAM(s) IntraMuscular once  insulin lispro (ADMELOG) corrective regimen sliding scale   SubCutaneous Before meals and at bedtime  metFORMIN 500 milliGRAM(s) Oral two times a day  methylPREDNISolone 32 milliGRAM(s) Oral daily  mycophenolate mofetil 500 milliGRAM(s) Oral every 24 hours  pantoprazole    Tablet 40 milliGRAM(s) Oral before breakfast  polyethylene glycol 3350 17 Gram(s) Oral daily  sodium chloride 1 Gram(s) Oral three times a day  trimethoprim  160 mG/sulfamethoxazole 800 mG 1 Tablet(s) Oral daily    MEDICATIONS  (PRN):  acetaminophen     Tablet .. 650 milliGRAM(s) Oral every 6 hours PRN Temp greater or equal to 38C (100.4F), Mild Pain (1 - 3)  benzocaine/menthol Lozenge 1 Lozenge Oral every 4 hours PRN Sore Throat  dextrose Oral Gel 15 Gram(s) Oral once PRN Blood Glucose LESS THAN 70 milliGRAM(s)/deciliter      Anthropometrics:  Dosing height: 68in  Dosing weight: 288.4#  BMI: 43.8  IBW: 140#         %IBW: 206%    Weight Change: no new weights since admit     Nutrition Focused Physical Exam: Completed [ x ]  Not Pertinent [   ]  >>Observed with no overt signs and symptoms of muscle or fat wasting.    Estimated energy needs:   Estimated Energy Needs Weight (lbs)	140 lb  Estimated Energy Needs Weight (kg)	63.5 kg  Estimated Energy Needs From (leo/kg)	25  Estimated Energy Needs To (leo/kg)	30  Estimated Energy Needs Calculated From (leo/kg)	1587  Estimated Energy Needs Calculated To (leo/kg)	1905  Weight used for calculations	IBW  Estimated Protein Needs Weight (lbs)	140 lb  Estimated Protein Needs Weight (kg)	63.5 kg  Estimated Protein Needs From (g/kg)	0.8  Estimated Protein Needs To (g/kg)	1  Estimated Protein Needs Calculated From (g/kg)	50.8  Estimated Protein Needs Calculated To (g/kg)	63.5    Other Calculations	Based on Standards of Care pt within % IBW (206%) thus actual body weight used for all calculations (140#). Fluid recs per team.    Subjective:   62 year old female with a past medical history of afib, and sciatica, who presented to [facility] for shortness of breath. Pt states that she went to the urgent care today where she had an XR done concerning for b/l lower infiltrates, and was driven here to the ED by car from the ambulance. She states that she can only walk 12-15 feet and feels short of breath. She states that she has a pulse ox at home and had readings of 82. She states that she had chronic SOB for several months and got worked up in [place] where she had a CT scan that was negative for PE. She also states that she was seen by a pulmonologist and rheumatology, where they ruled out lupus and other immunological disorders. Pt states that she has a cough with no sputum. Pt denies any uri sxs, chest pain, pain with breathing, abdominal pain, nvd, headaches,     Patient and partner seen at bedside for follow up assessment. On regular diet with good PO intake. Reinforced nutrition education on general healthful diet, provided handouts on general healthful nutrition therapy, plate method, snacking tips. Patient expressed appreciation and understanding. Also reviewed information on [facility] Core 4 Outpatient Weight Loss Program. Outpatient contact provided for potential enrollment: [email or web address], [phone]. Denies n/v/d/c/abd pain, last BM 9/9 per EMR. No PI documented, noted with +1 BL foot edema. No new weights since admit. Labs: Na low (129), BUN elevated (38), glucose trending 141-223 x 24 hours. Meds: abx, IVF, metformin, insulin, methylprednisolone, sodium chloride, bowel regimen. RD to remain available for additional nutrition interventions as needed.     Previous Nutrition Diagnosis: Overweight/Obesity related to excessive energy intake as evidenced by BMI 44    Active [ x ]  Resolved [   ]    Goal: Pt to recall and verbalize at least 3 nutrition education points     Recommendations:  1. Continue current diet order   2. Encourage pt to meet nutritional needs as able   3. Monitor labs: electrolytes, cmp  4. Monitor weights   5. Pain and bowel regimen per team   6. Will continue to assess/honor food preferences as able  7. Monitor adherence to diet education     Education: general healthful nutrition therapy, plate method, snacking tips, [facility] Core 4 outpatient weight loss program     Risk Level: High [   ] Moderate [ x ] Low [   ]

## 2023-09-11 NOTE — PROGRESS NOTE ADULT - PROBLEM SELECTOR PLAN 3
STABLE and currently in sinus rhythm. Pt states she had afib 1 month ago. Has not been taking medications.     - Lovenox AC   - consider lopressor 12.5 if afib Unclear etiology, likely myositis since pt has been having worsening weakness with movement of extremities vs Cellcept s/e. Symptoms improving subjectively.     - Neurology consulted, f/u recs

## 2023-09-11 NOTE — PROGRESS NOTE ADULT - ATTENDING COMMENTS
seen and examined on 9/11/23 at 10:30 am  and 3:30 PM    patient remains on 4.lts o2   appears comfortable   resting HR in the 110-120s   discussed with Pulm attending , discontinue Cellcept , increase dose of Methylprednisolone to 40mg daily , CTA finding reviewed with pulm attending , overall improvement in ground glass changes , minor infiltrate in lingula - however pattern not concerning for Infectious process per Pulm team     Given persistent HR of 110-120s and Afib on EKG , Cardiology consulted , they recommended Metoprolol Tartrate 25mg po q8hrs     Low threshold for Reconsulting ICU     Hyponatremia consistent with Hypovolemia based on urine studies from 9/10 , received 1lts IV fluids on 9/11 , can give 1 more lt of NS bolus     Plan discussed with the patient and all her questions were answered

## 2023-09-11 NOTE — PROGRESS NOTE ADULT - PROVIDER SPECIALTY LIST ADULT
Cardiology Localized Dermabrasion Text: The patient was draped in routine manner.  Localized dermabrasion using 3 x 17 mm wire brush was performed in routine manner to papillary dermis. This spot dermabrasion is being performed to complete skin cancer reconstruction. It also will eliminate the other sun damaged precancerous cells that are known to be part of the regional effect of a lifetime's worth of sun exposure. This localized dermabrasion is therapeutic and should not be considered cosmetic in any regard. Localized Dermabrasion With Wire Brush Text: The patient was draped in routine manner.  Localized dermabrasion using 3 x 17 mm wire brush was performed in routine manner to papillary dermis. This spot dermabrasion is being performed to complete skin cancer reconstruction. It also will eliminate the other sun damaged precancerous cells that are known to be part of the regional effect of a lifetime's worth of sun exposure. This localized dermabrasion is therapeutic and should not be considered cosmetic in any regard.

## 2023-09-11 NOTE — PROGRESS NOTE ADULT - SUBJECTIVE AND OBJECTIVE BOX
Patient is a 62y old  Female who presents with a chief complaint of Shortness of breath     HPI: The patient is a 62 year old female with a past medical history of afib, and sciatica, who presented to [facility] for shortness of breath. Pt stated that she went to the urgent care prior to admission where she had an XR concerning for b/l lower infiltrates. She stated that she could only walk 12-15 feet before feeling short of breath. She stated that she has a pulse ox at home showing readings of 82%. She stated that she had chronic SOB for several months and got worked up in [place] where she had a CT scan that was negative for PE. She also stated that she was seen by pulmonology and rheumatology, where they ruled out lupus and other immunological disorders. Pt states that she has a cough with no sputum. Pt denies any uri sxs, chest pain, pain with breathing, abdominal pain, nvd, headaches. While at [facility] patient was underwent bronchoscopy with BAL and biopsy and pulse steroids. ICU consulted i/s/o elevated HR.     Pt seen/examined at bedside. Pt AAOx3 and conversing fully. Pt denying chest pain, cough, SOB, abdominal pain, n/v. Pt stating that she had a similar a-fib RVR episode on 9/10 after reciving Cellcept and that she did not want to continue on that medication.     12 Point ROS Negative unless noted otherwise above.    INTERVAL HPI/OVERNIGHT EVENTS:   No overnight events   Afebrile, hemodynamically stable     ICU Vital Signs Last 24 Hrs  T(C): 36.6 (11 Sep 2023 19:08), Max: 36.7 (10 Sep 2023 21:54)  T(F): 97.8 (11 Sep 2023 19:08), Max: 98.1 (11 Sep 2023 12:52)  HR: 137 (11 Sep 2023 19:08) (85 - 137)  BP: 98/49 (11 Sep 2023 19:08) (89/54 - 114/74)  BP(mean): --  ABP: --  ABP(mean): --  RR: 18 (11 Sep 2023 19:08) (18 - 24)  SpO2: 96% (11 Sep 2023 19:08) (89% - 98%)    O2 Parameters below as of 11 Sep 2023 19:08  Patient On (Oxygen Delivery Method): nasal cannula  O2 Flow (L/min): 4        I&O's Summary        LABS:                        16.6   12.60 )-----------( 108      ( 11 Sep 2023 05:30 )             49.0     09-11    129<L>  |  96  |  38<H>  ----------------------------<  141<H>  4.5   |  22  |  0.82    Ca    8.9      11 Sep 2023 05:30  Phos  3.2     09-11  Mg     2.2     09-11        Urinalysis Basic - ( 11 Sep 2023 05:30 )    Color: x / Appearance: x / SG: x / pH: x  Gluc: 141 mg/dL / Ketone: x  / Bili: x / Urobili: x   Blood: x / Protein: x / Nitrite: x   Leuk Esterase: x / RBC: x / WBC x   Sq Epi: x / Non Sq Epi: x / Bacteria: x      CAPILLARY BLOOD GLUCOSE      POCT Blood Glucose.: 141 mg/dL (11 Sep 2023 18:20)  POCT Blood Glucose.: 165 mg/dL (11 Sep 2023 14:50)  POCT Blood Glucose.: 223 mg/dL (11 Sep 2023 09:53)  POCT Blood Glucose.: 165 mg/dL (11 Sep 2023 06:33)  POCT Blood Glucose.: 155 mg/dL (10 Sep 2023 23:11)        RADIOLOGY & ADDITIONAL TESTS:    Consultant(s) Notes Reviewed:  [x ] YES  [ ] NO    MEDICATIONS  (STANDING):  dextrose 5%. 1000 milliLiter(s) (50 mL/Hr) IV Continuous <Continuous>  dextrose 5%. 1000 milliLiter(s) (100 mL/Hr) IV Continuous <Continuous>  dextrose 50% Injectable 25 Gram(s) IV Push once  dextrose 50% Injectable 12.5 Gram(s) IV Push once  dextrose 50% Injectable 25 Gram(s) IV Push once  enoxaparin Injectable 40 milliGRAM(s) SubCutaneous every 12 hours  gabapentin 300 milliGRAM(s) Oral every 8 hours  glucagon  Injectable 1 milliGRAM(s) IntraMuscular once  insulin lispro (ADMELOG) corrective regimen sliding scale   SubCutaneous Before meals and at bedtime  metFORMIN 500 milliGRAM(s) Oral two times a day  methylPREDNISolone 32 milliGRAM(s) Oral daily  pantoprazole    Tablet 40 milliGRAM(s) Oral before breakfast  polyethylene glycol 3350 17 Gram(s) Oral daily  sodium chloride 1 Gram(s) Oral three times a day  sodium chloride 0.9% Bolus 1500 milliLiter(s) IV Bolus once  trimethoprim  160 mG/sulfamethoxazole 800 mG 1 Tablet(s) Oral daily    MEDICATIONS  (PRN):  acetaminophen     Tablet .. 650 milliGRAM(s) Oral every 6 hours PRN Temp greater or equal to 38C (100.4F), Mild Pain (1 - 3)  benzocaine/menthol Lozenge 1 Lozenge Oral every 4 hours PRN Sore Throat  dextrose Oral Gel 15 Gram(s) Oral once PRN Blood Glucose LESS THAN 70 milliGRAM(s)/deciliter      PHYSICAL EXAM:  GENERAL:   HEAD:  Atraumatic, Normocephalic  EYES: EOMI, PERRLA, conjunctiva and sclera clear  NECK: Supple, No JVD, Normal thyroid, no enlarged nodes  NERVOUS SYSTEM:  Alert & Awake.   CHEST/LUNG: B/L good air entry; No rales, rhonchi, or wheezing  HEART: S1S2 normal, no S3, Regular rate and rhythm; No murmurs  ABDOMEN: Soft, Nontender, Nondistended; Bowel sounds present  EXTREMITIES:  2+ Peripheral Pulses, No clubbing, cyanosis, or edema  LYMPH: No lymphadenopathy noted  SKIN: No rashes or lesions    Care Discussed with Consultants/Other Providers [ x] YES  [ ] NO Patient is a 62y old  Female who presents with a chief complaint of Shortness of breath     HPI: The patient is a 62 year old female with a past medical history of afib, and sciatica, who presented to [facility] for shortness of breath. Pt stated that she went to the urgent care prior to admission where she had an XR concerning for b/l lower infiltrates. She stated that she could only walk 12-15 feet before feeling short of breath. She stated that she has a pulse ox at home showing readings of 82%. She stated that she had chronic SOB for several months and got worked up in [place] where she had a CT scan that was negative for PE. She also stated that she was seen by pulmonology and rheumatology, where they ruled out lupus and other immunological disorders. Pt states that she has a cough with no sputum. Pt denies any uri sxs, chest pain, pain with breathing, abdominal pain, nvd, headaches. While at [facility] patient was underwent bronchoscopy with BAL and biopsy and pulse steroids. ICU consulted i/s/o elevated HR this AM and again this evening.     Pt seen/examined at bedside. Pt AAOx3 and conversing fully and comfortable in bed. Pt denies headaches, blurry vision, dizziness, palpitations, SOB, CP, abdominal pain. States she has received Cellcept over the weekend and attributes that medication to her a-fib with RVR. Pt states she was initially offered a B-Blocker and declined but is open to trying it now. Pt declined any gestures at upgrading to telemetry given asymptomatic.     12 Point ROS Negative unless noted otherwise above.    INTERVAL HPI/OVERNIGHT EVENTS:   No overnight events   Afebrile, hemodynamically stable     ICU Vital Signs Last 24 Hrs  T(C): 36.6 (11 Sep 2023 19:08), Max: 36.7 (10 Sep 2023 21:54)  T(F): 97.8 (11 Sep 2023 19:08), Max: 98.1 (11 Sep 2023 12:52)  HR: 137 (11 Sep 2023 19:08) (85 - 137)  BP: 98/49 (11 Sep 2023 19:08) (89/54 - 114/74)  BP(mean): --  ABP: --  ABP(mean): --  RR: 18 (11 Sep 2023 19:08) (18 - 24)  SpO2: 96% (11 Sep 2023 19:08) (89% - 98%)    O2 Parameters below as of 11 Sep 2023 19:08  Patient On (Oxygen Delivery Method): nasal cannula  O2 Flow (L/min): 4        I&O's Summary        LABS:                        16.6   12.60 )-----------( 108      ( 11 Sep 2023 05:30 )             49.0     09-11    129<L>  |  96  |  38<H>  ----------------------------<  141<H>  4.5   |  22  |  0.82    Ca    8.9      11 Sep 2023 05:30  Phos  3.2     09-11  Mg     2.2     09-11        Urinalysis Basic - ( 11 Sep 2023 05:30 )    Color: x / Appearance: x / SG: x / pH: x  Gluc: 141 mg/dL / Ketone: x  / Bili: x / Urobili: x   Blood: x / Protein: x / Nitrite: x   Leuk Esterase: x / RBC: x / WBC x   Sq Epi: x / Non Sq Epi: x / Bacteria: x      CAPILLARY BLOOD GLUCOSE      POCT Blood Glucose.: 141 mg/dL (11 Sep 2023 18:20)  POCT Blood Glucose.: 165 mg/dL (11 Sep 2023 14:50)  POCT Blood Glucose.: 223 mg/dL (11 Sep 2023 09:53)  POCT Blood Glucose.: 165 mg/dL (11 Sep 2023 06:33)  POCT Blood Glucose.: 155 mg/dL (10 Sep 2023 23:11)        RADIOLOGY & ADDITIONAL TESTS:    Consultant(s) Notes Reviewed:  [x ] YES  [ ] NO    MEDICATIONS  (STANDING):  dextrose 5%. 1000 milliLiter(s) (50 mL/Hr) IV Continuous <Continuous>  dextrose 5%. 1000 milliLiter(s) (100 mL/Hr) IV Continuous <Continuous>  dextrose 50% Injectable 25 Gram(s) IV Push once  dextrose 50% Injectable 12.5 Gram(s) IV Push once  dextrose 50% Injectable 25 Gram(s) IV Push once  enoxaparin Injectable 40 milliGRAM(s) SubCutaneous every 12 hours  gabapentin 300 milliGRAM(s) Oral every 8 hours  glucagon  Injectable 1 milliGRAM(s) IntraMuscular once  insulin lispro (ADMELOG) corrective regimen sliding scale   SubCutaneous Before meals and at bedtime  metFORMIN 500 milliGRAM(s) Oral two times a day  methylPREDNISolone 32 milliGRAM(s) Oral daily  pantoprazole    Tablet 40 milliGRAM(s) Oral before breakfast  polyethylene glycol 3350 17 Gram(s) Oral daily  sodium chloride 1 Gram(s) Oral three times a day  sodium chloride 0.9% Bolus 1500 milliLiter(s) IV Bolus once  trimethoprim  160 mG/sulfamethoxazole 800 mG 1 Tablet(s) Oral daily    MEDICATIONS  (PRN):  acetaminophen     Tablet .. 650 milliGRAM(s) Oral every 6 hours PRN Temp greater or equal to 38C (100.4F), Mild Pain (1 - 3)  benzocaine/menthol Lozenge 1 Lozenge Oral every 4 hours PRN Sore Throat  dextrose Oral Gel 15 Gram(s) Oral once PRN Blood Glucose LESS THAN 70 milliGRAM(s)/deciliter      PHYSICAL EXAM:  GENERAL: AOx3, comfortable.   HEAD:  Atraumatic, Normocephalic  EYES: EOMI, PERRLA, conjunctiva and sclera clear  NECK: Supple, No JVD, Normal thyroid, no enlarged nodes  NERVOUS SYSTEM:  Alert & Awake.   CHEST/LUNG: B/L good air entry; No rales, rhonchi, or wheezing  HEART: + Tachycardic, regular rhythm with no obvious murmurs  ABDOMEN: Soft, Nontender, Nondistended; Bowel sounds present  EXTREMITIES:  2+ Peripheral Pulses, No clubbing, cyanosis, or edema  LYMPH: No lymphadenopathy noted  SKIN: Some ecchyomosis on R upper extremity, no other rashes noted.     Care Discussed with Consultants/Other Providers [ x] YES  [ ] NO

## 2023-09-11 NOTE — CONSULT NOTE ADULT - ATTENDING COMMENTS
62 F with Afib and sciatica presented with dyspnea, hypoxia, and BL infiltrates. ARIANA was 1:1280, anti-Sm pos, anti-RNP pos. She was diagnosed with MCTD, organizing PNA, and NSIP and started on methylprednisolone and mycophenolate. The patient states she had Afib w RVR after the first dose of mycophenolate and had another episode of the second dose yesterday.   1. Afib w RVR  2. ARFH  3. MCTD  4. OP/NSIP  - crystalloid bolus  - metoprolol iv

## 2023-09-11 NOTE — PROGRESS NOTE ADULT - SUBJECTIVE AND OBJECTIVE BOX
Cardiology Consult    O/N: pt had runs of AFib with RVR, rate 130s. ICU consulted   Interval History/HPI: Pt seen and examined at bedside. She reports that all of the issues overnight were due to her not feeling well after getting Cellcept. She knows that she went into AFib but states that it was only due to the circumstances she was in and getting the medication. She still does not want to be on any medications long-term and states that she would only take Metoprolol for 30 days to help her "settle" and transition back home.      OBJECTIVE  T(C): 36.7 (09-11-23 @ 12:52), Max: 37.3 (09-10-23 @ 15:20)  HR: 116 (09-11-23 @ 12:52) (85 - 136)  BP: 104/72 (09-11-23 @ 12:52) (89/54 - 114/74)  RR: 18 (09-11-23 @ 12:52) (18 - 24)  SpO2: 98% (09-11-23 @ 12:52) (89% - 98%)      PHYSICAL EXAM:    GEN: Awake, alert. NAD.   HEENT: NCAT, PERRL, EOMI. on NC  RESP: CTA b/l  CV: RRR. Normal S1/S2. No m/r/g.  ABD: Soft. NT/ND. BS+  EXT: Warm. No edema, clubbing, or cyanosis.   NEURO: AAOx3. No focal deficits.       LABS:                        16.6   12.60 )-----------( 108      ( 11 Sep 2023 05:30 )             49.0     09-11    129<L>  |  96  |  38<H>  ----------------------------<  141<H>  4.5   |  22  |  0.82    Ca    8.9      11 Sep 2023 05:30  Phos  3.2     09-11  Mg     2.2     09-11          RADIOLOGY & ADDITIONAL TESTS:  Reviewed .    MEDICATIONS  (STANDING):  dextrose 5%. 1000 milliLiter(s) (50 mL/Hr) IV Continuous <Continuous>  dextrose 5%. 1000 milliLiter(s) (100 mL/Hr) IV Continuous <Continuous>  dextrose 50% Injectable 25 Gram(s) IV Push once  dextrose 50% Injectable 12.5 Gram(s) IV Push once  dextrose 50% Injectable 25 Gram(s) IV Push once  enoxaparin Injectable 40 milliGRAM(s) SubCutaneous every 12 hours  gabapentin 300 milliGRAM(s) Oral every 8 hours  glucagon  Injectable 1 milliGRAM(s) IntraMuscular once  insulin lispro (ADMELOG) corrective regimen sliding scale   SubCutaneous Before meals and at bedtime  metFORMIN 500 milliGRAM(s) Oral two times a day  methylPREDNISolone 32 milliGRAM(s) Oral daily  mycophenolate mofetil 500 milliGRAM(s) Oral every 24 hours  pantoprazole    Tablet 40 milliGRAM(s) Oral before breakfast  polyethylene glycol 3350 17 Gram(s) Oral daily  sodium chloride 1 Gram(s) Oral three times a day  trimethoprim  160 mG/sulfamethoxazole 800 mG 1 Tablet(s) Oral daily    MEDICATIONS  (PRN):  acetaminophen     Tablet .. 650 milliGRAM(s) Oral every 6 hours PRN Temp greater or equal to 38C (100.4F), Mild Pain (1 - 3)  benzocaine/menthol Lozenge 1 Lozenge Oral every 4 hours PRN Sore Throat  dextrose Oral Gel 15 Gram(s) Oral once PRN Blood Glucose LESS THAN 70 milliGRAM(s)/deciliter

## 2023-09-11 NOTE — PROGRESS NOTE ADULT - PROBLEM SELECTOR PROBLEM 4
"Contacted pt and conveyed recommendations per Dr Rama Landin listed below. ""Thank you, based on her labs she should be able to switch to oral on 6/2 after 4-weeks of IV. We have her scheduled for telephone follow-up that day. Can you let her know that we will plan to switch that day unless new concerns arise. \""  " MCTD (mixed connective tissue disease)

## 2023-09-11 NOTE — PROGRESS NOTE ADULT - ASSESSMENT
62 F w/ PMHX of AFib (off BB and a/c) who p/w progressive chronic MANZANO since march w/ bilateral infiltrates on CT which have progressed over the last few months and now diagnosed with MCTD, organizing PNA, and NSIP, started on steroids and mycophenolate. Cardiology consulted for Afib.    TTE 2021 - normal RV/LV fxn w/ mild LVH   TTE 7/2023 [place]: nl lv fxn w/ normal diastology, nl rv systolix fxn, normal atria and no sig valve dz  EKG 8/26: sins w/ low voltage. non specfic t wave flattening      #Afib  - patient diagnosed with AF 1 mo ago, was on Toprol x30 days and has since d/c'ed  - [id number] +1 for female, no indication for a/c at this time  - recommend starting Metoprolol tartrate 25mg PO q8hrs as patient is at risk for recurrent AF iso hypoxia         - pt has agreed to 30-day trial of BB  - would monitor fever trend given Tmax of 100.2, possible infection that could be provoking recurrent Afib  - replete electrolytes so K>4, Mg>2  - f/u with EP/cardiology out-patient (Dr. Cadena)      Please see attending attestation for final recommendations.

## 2023-09-11 NOTE — PROGRESS NOTE ADULT - ATTENDING COMMENTS
Patient is a 62 F w/ PMHX of AFib (off bb and a/c) who p/w progressive chronic MANZANO since march w/ bilateral infiltrates on CT which have progressed over the last few months likely in setting of underlying lung pathology.     REVIEW OF STUDIES  - TTE 2021 - normal RV/LV fxn w/ mild LVH   - TTE 7/2023 [place]: nl lv fxn w/ normal diastology, normal systolic fxn, normal atria and no sig valve dz  - EKG 8/26: sins w/ low voltage. non specific t wave flattening    Assessment:  1. Afib RVR  2. 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 Start PO metoprolol tartrate 25mg q8h  2. Patient will have [id number] score 3 in 3 months when she turns 63 so needs systemic embolization prevention w/ AC which she adamantly refuses  3. Rest of management as per pulm and primary team    I saw, evaluated, and examined the patient at the bedside. I discussed the patient’s case with the cardiology fellow and agree with fellow’s note, ROS findings, physical exam, assessment, and plan as documented in the fellow’s note, with the following addendum. Total of 50 minutes were spent in reviewing the patient’s chart, examining the patient, and discussing patient’s assessment and plan with the patient and patient’s medical providers. I answered all questions related to the patient’s cardiac conditions and workup and management plan.     Thank you for the consult    Bykathi Vega M.D.  CARDIOLOGY ATTENDING    Please call with any questions:  Service Line: [phone] (PAGER)

## 2023-09-11 NOTE — PROGRESS NOTE ADULT - ATTENDING COMMENTS
Episode of atrial fib, she thinks due to MMF. Suspect this is not the case but will stop MMF and give higher dose of methylprednisolone for now.

## 2023-09-11 NOTE — PROGRESS NOTE ADULT - SUBJECTIVE AND OBJECTIVE BOX
OVERNIGHT EVENTS: At 430am, tachy to 130s, BPs 89/54, satting 89% on 4L. Non-toxic, reporting dry mouth, no pain. Pt evaluated by beside, asx other than feeling her heart race when she is tachycardic. Repeat Vitals 97.7F oral, HR 110s-130s, BP 90s/60s MAP around 67. SpO2 92 on 4L. EKG demonstrates afib with RVR . 1L NS ordered. ICU consulted.  Given lopressor IV 2.5 x2 with /70s --> 104/70s, HR ~130-140s decreased to ~110-120s. ofirmev 1g IV given. Additional lopressor IVP 5 given. -110s, BPs 95/73 (MAP 74).    SUBJECTIVE    Pt was seen and examined at bedside. Pt reports subjective symptomatic improvement w/ weakness and shortness of breath.       PHYSICAL EXAM     General: NAD on 4L O2   HEENT: NC/AT; PERRL; Neck Supple; no JVD.   Respiratory: faint basilar crackle b/l   Cardiovascular: Regular rhythm, tachycardia; + S1/S2; no murmurs, rubs gallops   Gastrointestinal: Soft; NTND; bowel sounds normal and present  Extremities: WWP; no edema/cyanosis,  2+ pulses b/l at all extremities   Neurological: A&Ox3,  no focal deficits  Integument: intact; normal turgor, texture, temperature, color for age      Vitals:  ============  T(C): 36.7 (09-11-23 @ 12:52), Max: 36.7 (09-10-23 @ 21:54)  HR: 116 (09-11-23 @ 12:52) (85 - 136)  BP: 104/72 (09-11-23 @ 12:52) (89/54 - 114/74)  RR: 18 (09-11-23 @ 12:52) (18 - 24)  SpO2: 98% (09-11-23 @ 12:52) (89% - 98%)        =======================================================  Current Antibiotics:  trimethoprim  160 mG/sulfamethoxazole 800 mG 1 Tablet(s) Oral daily    Other medications:  dextrose 5%. 1000 milliLiter(s) IV Continuous <Continuous>  dextrose 5%. 1000 milliLiter(s) IV Continuous <Continuous>  dextrose 50% Injectable 25 Gram(s) IV Push once  dextrose 50% Injectable 25 Gram(s) IV Push once  dextrose 50% Injectable 12.5 Gram(s) IV Push once  enoxaparin Injectable 40 milliGRAM(s) SubCutaneous every 12 hours  gabapentin 300 milliGRAM(s) Oral every 8 hours  glucagon  Injectable 1 milliGRAM(s) IntraMuscular once  insulin lispro (ADMELOG) corrective regimen sliding scale   SubCutaneous Before meals and at bedtime  metFORMIN 500 milliGRAM(s) Oral two times a day  methylPREDNISolone 32 milliGRAM(s) Oral daily  mycophenolate mofetil 500 milliGRAM(s) Oral every 24 hours  pantoprazole    Tablet 40 milliGRAM(s) Oral before breakfast  polyethylene glycol 3350 17 Gram(s) Oral daily  sodium chloride 1 Gram(s) Oral three times a day      =======================================================  Labs:                        16.6   12.60 )-----------( 108      ( 11 Sep 2023 05:30 )             49.0     09-11    129<L>  |  96  |  38<H>  ----------------------------<  141<H>  4.5   |  22  |  0.82    Ca    8.9      11 Sep 2023 05:30  Phos  3.2     09-11  Mg     2.2     09-11        Culture - Fungal, Bronchial (collected 08-30-23 @ 12:54)  Source: .Bronchial RLL BAL      Culture - Bronchial (collected 08-30-23 @ 12:54)  Source: Lavage RLL BAL  Gram Stain (08-30-23 @ 16:26):    Rare epithelial cells    Rare WBC's    Few Gram Positive Rods    Few Gram Positive Cocci in Pairs and Chains  Final Report (09-01-23 @ 14:22):    Normal Respiratory Noreen present including    7,000 CFU/ml Haemophilus parainfluenzae        < from: CT Angio Chest PE Protocol w/ IV Cont (09.11.23 @ 13:45) >  IMPRESSION:  1.  Improved/decreased extent of bilateral groundglass opacities and   reticular markings compared to the previous study. Findings are   nonspecific but differential etiologies include interstitial pneumonia,   drug toxicity, nonspecific connective tissue disorders.  2.  New small focus of parenchymal consolidation seen in the lingula;   possible small focal pneumonia.  3.  No pulmonary embolism    < end of copied text >

## 2023-09-12 DIAGNOSIS — R00.0 TACHYCARDIA, UNSPECIFIED: ICD-10-CM

## 2023-09-12 LAB
ALBUMIN SERPL ELPH-MCNC: 2.3 G/DL — LOW (ref 3.3–5)
ALBUMIN SERPL ELPH-MCNC: 2.5 G/DL — LOW (ref 3.3–5)
ALP SERPL-CCNC: 108 U/L — SIGNIFICANT CHANGE UP (ref 40–120)
ALP SERPL-CCNC: 110 U/L — SIGNIFICANT CHANGE UP (ref 40–120)
ALT FLD-CCNC: 102 U/L — HIGH (ref 10–45)
ALT FLD-CCNC: 91 U/L — HIGH (ref 10–45)
ANION GAP SERPL CALC-SCNC: 11 MMOL/L — SIGNIFICANT CHANGE UP (ref 5–17)
ANION GAP SERPL CALC-SCNC: 12 MMOL/L — SIGNIFICANT CHANGE UP (ref 5–17)
APPEARANCE UR: CLEAR — SIGNIFICANT CHANGE UP
AST SERPL-CCNC: 40 U/L — SIGNIFICANT CHANGE UP (ref 10–40)
AST SERPL-CCNC: 54 U/L — HIGH (ref 10–40)
BACTERIA # UR AUTO: PRESENT /HPF
BASOPHILS # BLD AUTO: 0.03 K/UL — SIGNIFICANT CHANGE UP (ref 0–0.2)
BASOPHILS # BLD AUTO: 0.04 K/UL — SIGNIFICANT CHANGE UP (ref 0–0.2)
BASOPHILS NFR BLD AUTO: 0.2 % — SIGNIFICANT CHANGE UP (ref 0–2)
BASOPHILS NFR BLD AUTO: 0.3 % — SIGNIFICANT CHANGE UP (ref 0–2)
BILIRUB SERPL-MCNC: 1 MG/DL — SIGNIFICANT CHANGE UP (ref 0.2–1.2)
BILIRUB SERPL-MCNC: 1 MG/DL — SIGNIFICANT CHANGE UP (ref 0.2–1.2)
BILIRUB UR-MCNC: ABNORMAL
BUN SERPL-MCNC: 34 MG/DL — HIGH (ref 7–23)
BUN SERPL-MCNC: 35 MG/DL — HIGH (ref 7–23)
CALCIUM SERPL-MCNC: 8.1 MG/DL — LOW (ref 8.4–10.5)
CALCIUM SERPL-MCNC: 8.2 MG/DL — LOW (ref 8.4–10.5)
CHLORIDE SERPL-SCNC: 101 MMOL/L — SIGNIFICANT CHANGE UP (ref 96–108)
CHLORIDE SERPL-SCNC: 104 MMOL/L — SIGNIFICANT CHANGE UP (ref 96–108)
CO2 SERPL-SCNC: 17 MMOL/L — LOW (ref 22–31)
CO2 SERPL-SCNC: 18 MMOL/L — LOW (ref 22–31)
COLOR SPEC: YELLOW — SIGNIFICANT CHANGE UP
COMMENT - URINE: SIGNIFICANT CHANGE UP
CREAT SERPL-MCNC: 0.75 MG/DL — SIGNIFICANT CHANGE UP (ref 0.5–1.3)
CREAT SERPL-MCNC: 0.84 MG/DL — SIGNIFICANT CHANGE UP (ref 0.5–1.3)
DIFF PNL FLD: ABNORMAL
EGFR: 78 ML/MIN/1.73M2 — SIGNIFICANT CHANGE UP
EGFR: 89 ML/MIN/1.73M2 — SIGNIFICANT CHANGE UP
EOSINOPHIL # BLD AUTO: 0.01 K/UL — SIGNIFICANT CHANGE UP (ref 0–0.5)
EOSINOPHIL # BLD AUTO: 0.04 K/UL — SIGNIFICANT CHANGE UP (ref 0–0.5)
EOSINOPHIL NFR BLD AUTO: 0.1 % — SIGNIFICANT CHANGE UP (ref 0–6)
EOSINOPHIL NFR BLD AUTO: 0.3 % — SIGNIFICANT CHANGE UP (ref 0–6)
EPI CELLS # UR: SIGNIFICANT CHANGE UP /HPF (ref 0–5)
GLUCOSE BLDC GLUCOMTR-MCNC: 129 MG/DL — HIGH (ref 70–99)
GLUCOSE BLDC GLUCOMTR-MCNC: 145 MG/DL — HIGH (ref 70–99)
GLUCOSE BLDC GLUCOMTR-MCNC: 148 MG/DL — HIGH (ref 70–99)
GLUCOSE BLDC GLUCOMTR-MCNC: 176 MG/DL — HIGH (ref 70–99)
GLUCOSE SERPL-MCNC: 133 MG/DL — HIGH (ref 70–99)
GLUCOSE SERPL-MCNC: 157 MG/DL — HIGH (ref 70–99)
GLUCOSE UR QL: 100
HCT VFR BLD CALC: 50 % — HIGH (ref 34.5–45)
HCT VFR BLD CALC: 51.5 % — HIGH (ref 34.5–45)
HGB BLD-MCNC: 15.9 G/DL — HIGH (ref 11.5–15.5)
HGB BLD-MCNC: 17 G/DL — HIGH (ref 11.5–15.5)
HYALINE CASTS # UR AUTO: ABNORMAL /LPF (ref 0–2)
IMM GRANULOCYTES NFR BLD AUTO: 0.5 % — SIGNIFICANT CHANGE UP (ref 0–0.9)
IMM GRANULOCYTES NFR BLD AUTO: 0.6 % — SIGNIFICANT CHANGE UP (ref 0–0.9)
KETONES UR-MCNC: NEGATIVE — SIGNIFICANT CHANGE UP
LACTATE SERPL-SCNC: 4.3 MMOL/L — CRITICAL HIGH (ref 0.5–2)
LACTATE SERPL-SCNC: 5.4 MMOL/L — CRITICAL HIGH (ref 0.5–2)
LEGIONELLA AG UR QL: NEGATIVE — SIGNIFICANT CHANGE UP
LEUKOCYTE ESTERASE UR-ACNC: NEGATIVE — SIGNIFICANT CHANGE UP
LYMPHOCYTES # BLD AUTO: 0.54 K/UL — LOW (ref 1–3.3)
LYMPHOCYTES # BLD AUTO: 0.61 K/UL — LOW (ref 1–3.3)
LYMPHOCYTES # BLD AUTO: 3.3 % — LOW (ref 13–44)
LYMPHOCYTES # BLD AUTO: 4 % — LOW (ref 13–44)
MAGNESIUM SERPL-MCNC: 2 MG/DL — SIGNIFICANT CHANGE UP (ref 1.6–2.6)
MAGNESIUM SERPL-MCNC: 2 MG/DL — SIGNIFICANT CHANGE UP (ref 1.6–2.6)
MCHC RBC-ENTMCNC: 30.4 PG — SIGNIFICANT CHANGE UP (ref 27–34)
MCHC RBC-ENTMCNC: 31.1 PG — SIGNIFICANT CHANGE UP (ref 27–34)
MCHC RBC-ENTMCNC: 31.8 GM/DL — LOW (ref 32–36)
MCHC RBC-ENTMCNC: 33 GM/DL — SIGNIFICANT CHANGE UP (ref 32–36)
MCV RBC AUTO: 94.1 FL — SIGNIFICANT CHANGE UP (ref 80–100)
MCV RBC AUTO: 95.6 FL — SIGNIFICANT CHANGE UP (ref 80–100)
MONOCYTES # BLD AUTO: 0.74 K/UL — SIGNIFICANT CHANGE UP (ref 0–0.9)
MONOCYTES # BLD AUTO: 0.87 K/UL — SIGNIFICANT CHANGE UP (ref 0–0.9)
MONOCYTES NFR BLD AUTO: 4.5 % — SIGNIFICANT CHANGE UP (ref 2–14)
MONOCYTES NFR BLD AUTO: 5.6 % — SIGNIFICANT CHANGE UP (ref 2–14)
MRSA PCR RESULT.: NEGATIVE — SIGNIFICANT CHANGE UP
NEUTROPHILS # BLD AUTO: 13.74 K/UL — HIGH (ref 1.8–7.4)
NEUTROPHILS # BLD AUTO: 15.17 K/UL — HIGH (ref 1.8–7.4)
NEUTROPHILS NFR BLD AUTO: 89.2 % — HIGH (ref 43–77)
NEUTROPHILS NFR BLD AUTO: 91.4 % — HIGH (ref 43–77)
NITRITE UR-MCNC: POSITIVE
NRBC # BLD: 0 /100 WBCS — SIGNIFICANT CHANGE UP (ref 0–0)
NRBC # BLD: 0 /100 WBCS — SIGNIFICANT CHANGE UP (ref 0–0)
NT-PROBNP SERPL-SCNC: 1511 PG/ML — HIGH (ref 0–300)
PH UR: 6 — SIGNIFICANT CHANGE UP (ref 5–8)
PHOSPHATE SERPL-MCNC: 3.3 MG/DL — SIGNIFICANT CHANGE UP (ref 2.5–4.5)
PHOSPHATE SERPL-MCNC: 3.9 MG/DL — SIGNIFICANT CHANGE UP (ref 2.5–4.5)
PLATELET # BLD AUTO: 112 K/UL — LOW (ref 150–400)
PLATELET # BLD AUTO: 117 K/UL — LOW (ref 150–400)
POTASSIUM SERPL-MCNC: 4.5 MMOL/L — SIGNIFICANT CHANGE UP (ref 3.5–5.3)
POTASSIUM SERPL-MCNC: 5.2 MMOL/L — SIGNIFICANT CHANGE UP (ref 3.5–5.3)
POTASSIUM SERPL-SCNC: 4.5 MMOL/L — SIGNIFICANT CHANGE UP (ref 3.5–5.3)
POTASSIUM SERPL-SCNC: 5.2 MMOL/L — SIGNIFICANT CHANGE UP (ref 3.5–5.3)
PROCALCITONIN SERPL-MCNC: 0.29 NG/ML — HIGH (ref 0.02–0.1)
PROT SERPL-MCNC: 5 G/DL — LOW (ref 6–8.3)
PROT SERPL-MCNC: 5.4 G/DL — LOW (ref 6–8.3)
PROT SERPL-MCNC: 5.5 G/DL — LOW (ref 6–8.3)
PROT SERPL-MCNC: 5.5 G/DL — LOW (ref 6–8.3)
PROT UR-MCNC: 30 MG/DL
RBC # BLD: 5.23 M/UL — HIGH (ref 3.8–5.2)
RBC # BLD: 5.47 M/UL — HIGH (ref 3.8–5.2)
RBC # FLD: 14.9 % — HIGH (ref 10.3–14.5)
RBC # FLD: 15.3 % — HIGH (ref 10.3–14.5)
RBC CASTS # UR COMP ASSIST: < 5 /HPF — SIGNIFICANT CHANGE UP
S AUREUS DNA NOSE QL NAA+PROBE: POSITIVE
S PNEUM AG UR QL: NEGATIVE — SIGNIFICANT CHANGE UP
SODIUM SERPL-SCNC: 130 MMOL/L — LOW (ref 135–145)
SODIUM SERPL-SCNC: 133 MMOL/L — LOW (ref 135–145)
SP GR SPEC: 1.02 — SIGNIFICANT CHANGE UP (ref 1–1.03)
UROBILINOGEN FLD QL: 2 E.U./DL
WBC # BLD: 15.4 K/UL — HIGH (ref 3.8–10.5)
WBC # BLD: 16.58 K/UL — HIGH (ref 3.8–10.5)
WBC # FLD AUTO: 15.4 K/UL — HIGH (ref 3.8–10.5)
WBC # FLD AUTO: 16.58 K/UL — HIGH (ref 3.8–10.5)
WBC UR QL: < 5 /HPF — SIGNIFICANT CHANGE UP

## 2023-09-12 PROCEDURE — 99232 SBSQ HOSP IP/OBS MODERATE 35: CPT

## 2023-09-12 PROCEDURE — 36000 PLACE NEEDLE IN VEIN: CPT

## 2023-09-12 PROCEDURE — 71045 X-RAY EXAM CHEST 1 VIEW: CPT | Mod: 26

## 2023-09-12 PROCEDURE — 93010 ELECTROCARDIOGRAM REPORT: CPT

## 2023-09-12 PROCEDURE — 76937 US GUIDE VASCULAR ACCESS: CPT | Mod: 26

## 2023-09-12 PROCEDURE — 93308 TTE F-UP OR LMTD: CPT | Mod: 26

## 2023-09-12 PROCEDURE — 99233 SBSQ HOSP IP/OBS HIGH 50: CPT | Mod: GC

## 2023-09-12 PROCEDURE — 99232 SBSQ HOSP IP/OBS MODERATE 35: CPT | Mod: GC

## 2023-09-12 RX ORDER — FUROSEMIDE 40 MG
10 TABLET ORAL ONCE
Refills: 0 | Status: COMPLETED | OUTPATIENT
Start: 2023-09-12 | End: 2023-09-12

## 2023-09-12 RX ORDER — VANCOMYCIN HCL 1 G
1500 VIAL (EA) INTRAVENOUS EVERY 12 HOURS
Refills: 0 | Status: DISCONTINUED | OUTPATIENT
Start: 2023-09-12 | End: 2023-09-12

## 2023-09-12 RX ORDER — ALBUMIN HUMAN 25 %
50 VIAL (ML) INTRAVENOUS EVERY 8 HOURS
Refills: 0 | Status: DISCONTINUED | OUTPATIENT
Start: 2023-09-12 | End: 2023-09-13

## 2023-09-12 RX ORDER — VANCOMYCIN HCL 1 G
2000 VIAL (EA) INTRAVENOUS EVERY 12 HOURS
Refills: 0 | Status: DISCONTINUED | OUTPATIENT
Start: 2023-09-12 | End: 2023-09-12

## 2023-09-12 RX ORDER — PIPERACILLIN AND TAZOBACTAM 4; .5 G/20ML; G/20ML
4.5 INJECTION, POWDER, LYOPHILIZED, FOR SOLUTION INTRAVENOUS EVERY 8 HOURS
Refills: 0 | Status: DISCONTINUED | OUTPATIENT
Start: 2023-09-12 | End: 2023-09-13

## 2023-09-12 RX ORDER — VANCOMYCIN HCL 1 G
2000 VIAL (EA) INTRAVENOUS ONCE
Refills: 0 | Status: DISCONTINUED | OUTPATIENT
Start: 2023-09-12 | End: 2023-09-12

## 2023-09-12 RX ORDER — PIPERACILLIN AND TAZOBACTAM 4; .5 G/20ML; G/20ML
4.5 INJECTION, POWDER, LYOPHILIZED, FOR SOLUTION INTRAVENOUS ONCE
Refills: 0 | Status: COMPLETED | OUTPATIENT
Start: 2023-09-12 | End: 2023-09-12

## 2023-09-12 RX ORDER — VANCOMYCIN HCL 1 G
2000 VIAL (EA) INTRAVENOUS ONCE
Refills: 0 | Status: COMPLETED | OUTPATIENT
Start: 2023-09-12 | End: 2023-09-12

## 2023-09-12 RX ORDER — PIPERACILLIN AND TAZOBACTAM 4; .5 G/20ML; G/20ML
3.38 INJECTION, POWDER, LYOPHILIZED, FOR SOLUTION INTRAVENOUS ONCE
Refills: 0 | Status: DISCONTINUED | OUTPATIENT
Start: 2023-09-12 | End: 2023-09-12

## 2023-09-12 RX ORDER — SODIUM CHLORIDE 9 MG/ML
1000 INJECTION INTRAMUSCULAR; INTRAVENOUS; SUBCUTANEOUS ONCE
Refills: 0 | Status: DISCONTINUED | OUTPATIENT
Start: 2023-09-12 | End: 2023-09-12

## 2023-09-12 RX ORDER — LIDOCAINE 4 G/100G
1 CREAM TOPICAL EVERY 24 HOURS
Refills: 0 | Status: DISCONTINUED | OUTPATIENT
Start: 2023-09-12 | End: 2023-09-13

## 2023-09-12 RX ORDER — METOPROLOL TARTRATE 50 MG
5 TABLET ORAL ONCE
Refills: 0 | Status: COMPLETED | OUTPATIENT
Start: 2023-09-12 | End: 2023-09-12

## 2023-09-12 RX ORDER — ACETAMINOPHEN 500 MG
1000 TABLET ORAL ONCE
Refills: 0 | Status: COMPLETED | OUTPATIENT
Start: 2023-09-12 | End: 2023-09-12

## 2023-09-12 RX ORDER — PIPERACILLIN AND TAZOBACTAM 4; .5 G/20ML; G/20ML
3.38 INJECTION, POWDER, LYOPHILIZED, FOR SOLUTION INTRAVENOUS EVERY 8 HOURS
Refills: 0 | Status: DISCONTINUED | OUTPATIENT
Start: 2023-09-12 | End: 2023-09-12

## 2023-09-12 RX ORDER — VANCOMYCIN HCL 1 G
1500 VIAL (EA) INTRAVENOUS EVERY 12 HOURS
Refills: 0 | Status: COMPLETED | OUTPATIENT
Start: 2023-09-12 | End: 2023-09-13

## 2023-09-12 RX ADMIN — Medication 10 MILLIGRAM(S): at 14:25

## 2023-09-12 RX ADMIN — METFORMIN HYDROCHLORIDE 500 MILLIGRAM(S): 850 TABLET ORAL at 18:30

## 2023-09-12 RX ADMIN — PANTOPRAZOLE SODIUM 40 MILLIGRAM(S): 20 TABLET, DELAYED RELEASE ORAL at 05:32

## 2023-09-12 RX ADMIN — SODIUM CHLORIDE 1 GRAM(S): 9 INJECTION INTRAMUSCULAR; INTRAVENOUS; SUBCUTANEOUS at 14:25

## 2023-09-12 RX ADMIN — Medication 50 MILLILITER(S): at 20:12

## 2023-09-12 RX ADMIN — SODIUM CHLORIDE 1000 MILLILITER(S): 9 INJECTION INTRAMUSCULAR; INTRAVENOUS; SUBCUTANEOUS at 01:24

## 2023-09-12 RX ADMIN — GABAPENTIN 300 MILLIGRAM(S): 400 CAPSULE ORAL at 21:42

## 2023-09-12 RX ADMIN — Medication 5 MILLIGRAM(S): at 02:11

## 2023-09-12 RX ADMIN — GABAPENTIN 300 MILLIGRAM(S): 400 CAPSULE ORAL at 13:06

## 2023-09-12 RX ADMIN — PIPERACILLIN AND TAZOBACTAM 25 GRAM(S): 4; .5 INJECTION, POWDER, LYOPHILIZED, FOR SOLUTION INTRAVENOUS at 21:42

## 2023-09-12 RX ADMIN — Medication 32 MILLIGRAM(S): at 05:29

## 2023-09-12 RX ADMIN — Medication 250 MILLIGRAM(S): at 06:14

## 2023-09-12 RX ADMIN — GABAPENTIN 300 MILLIGRAM(S): 400 CAPSULE ORAL at 05:30

## 2023-09-12 RX ADMIN — Medication 12.5 MILLIGRAM(S): at 13:06

## 2023-09-12 RX ADMIN — METFORMIN HYDROCHLORIDE 500 MILLIGRAM(S): 850 TABLET ORAL at 07:14

## 2023-09-12 RX ADMIN — Medication 300 MILLIGRAM(S): at 18:19

## 2023-09-12 RX ADMIN — Medication 50 MILLILITER(S): at 10:32

## 2023-09-12 RX ADMIN — PIPERACILLIN AND TAZOBACTAM 200 GRAM(S): 4; .5 INJECTION, POWDER, LYOPHILIZED, FOR SOLUTION INTRAVENOUS at 05:27

## 2023-09-12 RX ADMIN — LIDOCAINE 1 PATCH: 4 CREAM TOPICAL at 14:11

## 2023-09-12 RX ADMIN — Medication 5 MILLIGRAM(S): at 00:45

## 2023-09-12 RX ADMIN — PIPERACILLIN AND TAZOBACTAM 25 GRAM(S): 4; .5 INJECTION, POWDER, LYOPHILIZED, FOR SOLUTION INTRAVENOUS at 13:06

## 2023-09-12 RX ADMIN — Medication 650 MILLIGRAM(S): at 20:05

## 2023-09-12 RX ADMIN — Medication 1000 MILLIGRAM(S): at 01:56

## 2023-09-12 RX ADMIN — Medication 650 MILLIGRAM(S): at 19:05

## 2023-09-12 RX ADMIN — ENOXAPARIN SODIUM 40 MILLIGRAM(S): 100 INJECTION SUBCUTANEOUS at 05:30

## 2023-09-12 RX ADMIN — LIDOCAINE 1 PATCH: 4 CREAM TOPICAL at 23:19

## 2023-09-12 RX ADMIN — LIDOCAINE 1 PATCH: 4 CREAM TOPICAL at 02:19

## 2023-09-12 RX ADMIN — Medication 12.5 MILLIGRAM(S): at 00:41

## 2023-09-12 RX ADMIN — LIDOCAINE 1 PATCH: 4 CREAM TOPICAL at 05:33

## 2023-09-12 RX ADMIN — Medication 1 TABLET(S): at 11:58

## 2023-09-12 RX ADMIN — Medication 12.5 MILLIGRAM(S): at 21:43

## 2023-09-12 RX ADMIN — ENOXAPARIN SODIUM 40 MILLIGRAM(S): 100 INJECTION SUBCUTANEOUS at 18:19

## 2023-09-12 RX ADMIN — SODIUM CHLORIDE 1 GRAM(S): 9 INJECTION INTRAMUSCULAR; INTRAVENOUS; SUBCUTANEOUS at 05:29

## 2023-09-12 RX ADMIN — Medication 12.5 MILLIGRAM(S): at 05:30

## 2023-09-12 RX ADMIN — Medication 400 MILLIGRAM(S): at 01:35

## 2023-09-12 NOTE — PROGRESS NOTE ADULT - SUBJECTIVE AND OBJECTIVE BOX
-------------TRANSFER FROM [facility] TO [facility]-------------------  62 year old female with a past medical history of afib, and sciatica and connective tissue disease, who presented to [facility] for shortness of breath found to be hypoxic and have interstitial lung disease, admitted 8/26 for AHRF, further ILD workup and management. TTE 8/26 with normal LVEF. Pt was started on predinison on admission with gradually improving O2 requirement and has been stable on 2-3LNC since 9/3. Started steroid taper on 9/6 and fully transitioned to PO 9/8 overnight. Started on Mycophenolate mofetil (cellcept) 9/8 evening but pt reports subjective side effects with weakness. Episode of AF w RVR with HR up to 140s on 9/11 am, decreased to HR 10-110s with IV lopressor 10. CTA negative for PE but showed possible lingula pneumonia. ICU and cards consulted, recommending fluids and oral BB. Patient received 2L of fluids 9/12 pm with EKG showing NSR HR 90s, SBP . Before receiving oral tartrate 12.5 and an additional 1L bolus, pt developed heart palpitations, no CP/SOB. EKG HR 170s with SVT. BPs 105/70s. Did not respond to vagal maneuvers. Cards and ICU called. Bedside echo with preserved LVEF and small pericardial effusion. Pt given oral lopressor 12.5 and IV lopressor 5, with improvement of HR to 130s. SBP briefly dropped to 60-70s then recovered. Pt feeling warm with palpitations but otherwise asx. Started on additional 1L NS and transferred to .  -------------TRANSFER FROM [facility] TO [facility]-------------------    SUBJECTIVE / INTERVAL HPI:     Vital Signs Last 12 Hrs  T(F): 98.6 (09-12-23 @ 01:00), Max: 98.9 (09-11-23 @ 22:49)  HR: 84 (09-12-23 @ 02:50) (84 - 176)  BP: 87/61 (09-12-23 @ 02:50) (87/61 - 121/80)  BP(mean): 66 (09-12-23 @ 02:50) (66 - 97)  RR: 24 (09-12-23 @ 01:14) (17 - 24)  SpO2: 99% (09-12-23 @ 02:50) (82% - 99%)  I&O's Summary      PHYSICAL EXAM:  General: NAD on 4L O2   HEENT: NC/AT; PERRL; Neck Supple; no JVD.   Respiratory: faint basilar crackle b/l   Cardiovascular: tachy, no MRG  Gastrointestinal: Soft; NTND; bowel sounds normal and present  Extremities: WWP; no edema/cyanosis,  2+ pulses b/l at all extremities   Neurological: A&Ox3,  no focal deficits  Psych: appropriate        LABS:                        17.0   16.58 )-----------( 117      ( 12 Sep 2023 01:59 )             51.5     09-12    130<L>  |  101  |  34<H>  ----------------------------<  157<H>  4.5   |  18<L>  |  0.75    Ca    8.2<L>      12 Sep 2023 01:59  Phos  3.3     09-12  Mg     2.0     09-12    TPro  5.4<L>  /  Alb  2.5<L>  /  TBili  1.0  /  DBili  x   /  AST  40  /  ALT  91<H>  /  AlkPhos  110  09-12      Urinalysis Basic - ( 12 Sep 2023 01:59 )    Color: x / Appearance: x / SG: x / pH: x  Gluc: 157 mg/dL / Ketone: x  / Bili: x / Urobili: x   Blood: x / Protein: x / Nitrite: x   Leuk Esterase: x / RBC: x / WBC x   Sq Epi: x / Non Sq Epi: x / Bacteria: x          RADIOLOGY & ADDITIONAL TESTS:    MEDICATIONS  (STANDING):  dextrose 5%. 1000 milliLiter(s) (50 mL/Hr) IV Continuous <Continuous>  dextrose 5%. 1000 milliLiter(s) (100 mL/Hr) IV Continuous <Continuous>  dextrose 50% Injectable 25 Gram(s) IV Push once  dextrose 50% Injectable 12.5 Gram(s) IV Push once  dextrose 50% Injectable 25 Gram(s) IV Push once  enoxaparin Injectable 40 milliGRAM(s) SubCutaneous every 12 hours  gabapentin 300 milliGRAM(s) Oral every 8 hours  glucagon  Injectable 1 milliGRAM(s) IntraMuscular once  insulin lispro (ADMELOG) corrective regimen sliding scale   SubCutaneous Before meals and at bedtime  lidocaine   4% Patch 1 Patch Transdermal every 24 hours  metFORMIN 500 milliGRAM(s) Oral two times a day  methylPREDNISolone 32 milliGRAM(s) Oral daily  metoprolol tartrate 12.5 milliGRAM(s) Oral every 8 hours  pantoprazole    Tablet 40 milliGRAM(s) Oral before breakfast  piperacillin/tazobactam IVPB. 4.5 Gram(s) IV Intermittent once  piperacillin/tazobactam IVPB.- 4.5 Gram(s) IV Intermittent once  piperacillin/tazobactam IVPB.. 4.5 Gram(s) IV Intermittent every 8 hours  polyethylene glycol 3350 17 Gram(s) Oral daily  sodium chloride 1 Gram(s) Oral three times a day  trimethoprim  160 mG/sulfamethoxazole 800 mG 1 Tablet(s) Oral daily  vancomycin  IVPB 1500 milliGRAM(s) IV Intermittent every 12 hours  vancomycin  IVPB 2000 milliGRAM(s) IV Intermittent once    MEDICATIONS  (PRN):  acetaminophen     Tablet .. 650 milliGRAM(s) Oral every 6 hours PRN Temp greater or equal to 38C (100.4F), Mild Pain (1 - 3)  benzocaine/menthol Lozenge 1 Lozenge Oral every 4 hours PRN Sore Throat  dextrose Oral Gel 15 Gram(s) Oral once PRN Blood Glucose LESS THAN 70 milliGRAM(s)/deciliter   -------------TRANSFER FROM [facility] TO [facility]-------------------  62 year old female with a past medical history of afib, and sciatica and connective tissue disease, who presented to [facility] for shortness of breath found to be hypoxic and have interstitial lung disease, admitted 8/26 for AHRF, further ILD workup and management. TTE 8/26 with normal LVEF. Pt was started on predinison on admission with gradually improving O2 requirement and has been stable on 2-3LNC since 9/3. Started steroid taper on 9/6 and fully transitioned to PO 9/8 overnight. Started on Mycophenolate mofetil (cellcept) 9/8 evening but pt reports subjective side effects with weakness. Episode of AF w RVR with HR up to 140s on 9/11 am, decreased to HR 10-110s with IV lopressor 10. CTA negative for PE but showed possible lingula pneumonia. ICU and cards consulted, recommending fluids and oral BB. Patient received 2L of fluids 9/12 pm with EKG showing NSR HR 90s, SBP . Before receiving oral tartrate 12.5 and an additional 1L bolus, pt developed heart palpitations, no CP/SOB. EKG HR 170s with SVT. BPs 105/70s. Did not respond to vagal maneuvers. Cards and ICU called. Bedside echo with preserved LVEF and small pericardial effusion. Pt given oral lopressor 12.5 and IV lopressor 5, with improvement of HR to 130s. SBP briefly dropped to 60-70s then recovered. Pt feeling warm with palpitations but otherwise asx. Started on additional 1L NS and transferred to .  -------------TRANSFER FROM [facility] TO [facility]-------------------    SUBJECTIVE / INTERVAL HPI:   Patient evaluated at bedside. Patient complains of SOB and cough on NRB mask. Refuses BiPAP and HFNC due to discomfort.    Vital Signs Last 12 Hrs  T(F): 98.6 (09-12-23 @ 01:00), Max: 98.9 (09-11-23 @ 22:49)  HR: 84 (09-12-23 @ 02:50) (84 - 176)  BP: 87/61 (09-12-23 @ 02:50) (87/61 - 121/80)  BP(mean): 66 (09-12-23 @ 02:50) (66 - 97)  RR: 24 (09-12-23 @ 01:14) (17 - 24)  SpO2: 99% (09-12-23 @ 02:50) (82% - 99%)  I&O's Summary      PHYSICAL EXAM:  General: Uncomfortable on NRB O2 satting ~90%.  HEENT: NC/AT; PERRL; Neck Supple; no JVD.   Respiratory: SOB with increased wob. faint basilar crackle b/l   Cardiovascular: tachy, no MRG  Gastrointestinal: Soft; NTND; bowel sounds normal and present  Extremities: WWP; no edema/cyanosis,  2+ pulses b/l at all extremities   Neurological: A&Ox3,  no focal deficits  Psych: appropriate        LABS:                        17.0   16.58 )-----------( 117      ( 12 Sep 2023 01:59 )             51.5     09-12    130<L>  |  101  |  34<H>  ----------------------------<  157<H>  4.5   |  18<L>  |  0.75    Ca    8.2<L>      12 Sep 2023 01:59  Phos  3.3     09-12  Mg     2.0     09-12    TPro  5.4<L>  /  Alb  2.5<L>  /  TBili  1.0  /  DBili  x   /  AST  40  /  ALT  91<H>  /  AlkPhos  110  09-12      Urinalysis Basic - ( 12 Sep 2023 01:59 )    Color: x / Appearance: x / SG: x / pH: x  Gluc: 157 mg/dL / Ketone: x  / Bili: x / Urobili: x   Blood: x / Protein: x / Nitrite: x   Leuk Esterase: x / RBC: x / WBC x   Sq Epi: x / Non Sq Epi: x / Bacteria: x          RADIOLOGY & ADDITIONAL TESTS:    MEDICATIONS  (STANDING):  dextrose 5%. 1000 milliLiter(s) (50 mL/Hr) IV Continuous <Continuous>  dextrose 5%. 1000 milliLiter(s) (100 mL/Hr) IV Continuous <Continuous>  dextrose 50% Injectable 25 Gram(s) IV Push once  dextrose 50% Injectable 12.5 Gram(s) IV Push once  dextrose 50% Injectable 25 Gram(s) IV Push once  enoxaparin Injectable 40 milliGRAM(s) SubCutaneous every 12 hours  gabapentin 300 milliGRAM(s) Oral every 8 hours  glucagon  Injectable 1 milliGRAM(s) IntraMuscular once  insulin lispro (ADMELOG) corrective regimen sliding scale   SubCutaneous Before meals and at bedtime  lidocaine   4% Patch 1 Patch Transdermal every 24 hours  metFORMIN 500 milliGRAM(s) Oral two times a day  methylPREDNISolone 32 milliGRAM(s) Oral daily  metoprolol tartrate 12.5 milliGRAM(s) Oral every 8 hours  pantoprazole    Tablet 40 milliGRAM(s) Oral before breakfast  piperacillin/tazobactam IVPB. 4.5 Gram(s) IV Intermittent once  piperacillin/tazobactam IVPB.- 4.5 Gram(s) IV Intermittent once  piperacillin/tazobactam IVPB.. 4.5 Gram(s) IV Intermittent every 8 hours  polyethylene glycol 3350 17 Gram(s) Oral daily  sodium chloride 1 Gram(s) Oral three times a day  trimethoprim  160 mG/sulfamethoxazole 800 mG 1 Tablet(s) Oral daily  vancomycin  IVPB 1500 milliGRAM(s) IV Intermittent every 12 hours  vancomycin  IVPB 2000 milliGRAM(s) IV Intermittent once    MEDICATIONS  (PRN):  acetaminophen     Tablet .. 650 milliGRAM(s) Oral every 6 hours PRN Temp greater or equal to 38C (100.4F), Mild Pain (1 - 3)  benzocaine/menthol Lozenge 1 Lozenge Oral every 4 hours PRN Sore Throat  dextrose Oral Gel 15 Gram(s) Oral once PRN Blood Glucose LESS THAN 70 milliGRAM(s)/deciliter

## 2023-09-12 NOTE — PROGRESS NOTE ADULT - SUBJECTIVE AND OBJECTIVE BOX
Cardiology Consult    O/N: Pt went into Afib with RVR and had episode of AVNRT overnight, given IV lopressor 5mg x2. Transferred to telemetry. Also noted to be more hypoxic and hypotensive with lactate 5.4, given 3L IVF  Interval History/HPI: Pt seen and examined at bedside. She is tired and does not want to be bothered at this time. Declines interview by cardiology team.  Telemetry: NSR, rate 80s    OBJECTIVE  T(C): 37.1 (09-12-23 @ 13:35), Max: 37.2 (09-11-23 @ 22:49)  HR: 84 (09-12-23 @ 13:09) (80 - 176)  BP: 109/71 (09-12-23 @ 13:09) (87/61 - 123/74)  RR: 20 (09-12-23 @ 13:09) (17 - 33)  SpO2: 95% (09-12-23 @ 13:09) (82% - 99%)    09-11-23 @ 07:01  -  09-12-23 @ 07:00  --------------------------------------------------------  IN: 0 mL / OUT: 450 mL / NET: -450 mL    09-12-23 @ 07:01  -  09-12-23 @ 13:53  --------------------------------------------------------  IN: 710 mL / OUT: 200 mL / NET: 510 mL        PHYSICAL EXAM:    Pt declined physical examination          LABS:                        15.9   15.40 )-----------( 112      ( 12 Sep 2023 06:21 )             50.0     09-12    133<L>  |  104  |  35<H>  ----------------------------<  133<H>  5.2   |  17<L>  |  0.84    Ca    8.1<L>      12 Sep 2023 06:21  Phos  3.9     09-12  Mg     2.0     09-12    TPro  5.0<L>  /  Alb  2.3<L>  /  TBili  1.0  /  DBili  x   /  AST  54<H>  /  ALT  102<H>  /  AlkPhos  108  09-12        RADIOLOGY & ADDITIONAL TESTS:  Reviewed .    MEDICATIONS  (STANDING):  albumin human 25% IVPB 50 milliLiter(s) IV Intermittent every 8 hours  dextrose 5%. 1000 milliLiter(s) (50 mL/Hr) IV Continuous <Continuous>  dextrose 5%. 1000 milliLiter(s) (100 mL/Hr) IV Continuous <Continuous>  dextrose 50% Injectable 25 Gram(s) IV Push once  dextrose 50% Injectable 12.5 Gram(s) IV Push once  dextrose 50% Injectable 25 Gram(s) IV Push once  enoxaparin Injectable 40 milliGRAM(s) SubCutaneous every 12 hours  gabapentin 300 milliGRAM(s) Oral every 8 hours  glucagon  Injectable 1 milliGRAM(s) IntraMuscular once  insulin lispro (ADMELOG) corrective regimen sliding scale   SubCutaneous Before meals and at bedtime  lidocaine   4% Patch 1 Patch Transdermal every 24 hours  metFORMIN 500 milliGRAM(s) Oral two times a day  metoprolol tartrate 12.5 milliGRAM(s) Oral every 8 hours  pantoprazole    Tablet 40 milliGRAM(s) Oral before breakfast  piperacillin/tazobactam IVPB.. 4.5 Gram(s) IV Intermittent every 8 hours  polyethylene glycol 3350 17 Gram(s) Oral daily  sodium chloride 1 Gram(s) Oral three times a day  trimethoprim  160 mG/sulfamethoxazole 800 mG 1 Tablet(s) Oral daily  vancomycin  IVPB 1500 milliGRAM(s) IV Intermittent every 12 hours    MEDICATIONS  (PRN):  acetaminophen     Tablet .. 650 milliGRAM(s) Oral every 6 hours PRN Temp greater or equal to 38C (100.4F), Mild Pain (1 - 3)  benzocaine/menthol Lozenge 1 Lozenge Oral every 4 hours PRN Sore Throat  dextrose Oral Gel 15 Gram(s) Oral once PRN Blood Glucose LESS THAN 70 milliGRAM(s)/deciliter

## 2023-09-12 NOTE — PROGRESS NOTE ADULT - SUBJECTIVE AND OBJECTIVE BOX
-------------TRANSFER FROM [facility] TO [facility]-------------------  62 year old female with a past medical history of afib, and sciatica and connective tissue disease, who presented to [facility] for shortness of breath found to be hypoxic and have interstitial lung disease, admitted 8/26 for AHRF, further ILD workup and management. TTE 8/26 with normal LVEF. Pt was started on predinison on admission with gradually improving O2 requirement and has been stable on 2-3LNC since 9/3. Started steroid taper on 9/6 and fully transitioned to PO 9/8 overnight. Started on Mycophenolate mofetil (cellcept) 9/8 evening but pt reports subjective side effects with weakness. Episode of AF w RVR with HR up to 140s on 9/11 am, decreased to HR 10-110s with IV lopressor 10. CTA negative for PE but showed possible lingula pneumonia. ICU and cards consulted, recommending fluids and oral BB.     SUBJECTIVE / INTERVAL HPI: Patient received 2L of fluids 9/12 pm with EKG showing NSR HR 90s, SBP . Before receiving oral tartrate 12.5 and an additional 1L bolus, pt developed heart palpitations, no CP/SOB. EKG HR 170s with SVT. BPs 105/70s. Did not respond to vagal maneuvers. Cards and ICU called. Bedside echo with preserved LVEF and small pericardial effusion. Pt given oral lopressor 12.5 and IV lopressor 5, with improvement of HR to 130s. SBP briefly dropped to 60-70s then recovered. Pt feeling warm with palpitations but otherwise asx. Started on additional 1L NS and transferred to .    PHYSICAL EXAM:    General: NAD on 4L O2   HEENT: NC/AT; PERRL; Neck Supple; no JVD.   Respiratory: faint basilar crackle b/l   Cardiovascular: tachy, no MRG  Gastrointestinal: Soft; NTND; bowel sounds normal and present  Extremities: WWP; no edema/cyanosis,  2+ pulses b/l at all extremities   Neurological: A&Ox3,  no focal deficits  Psych: appropriate    VITAL SIGNS:  Vital Signs Last 24 Hrs  T(C): 37 (12 Sep 2023 01:00), Max: 37.2 (11 Sep 2023 22:49)  T(F): 98.6 (12 Sep 2023 01:00), Max: 98.9 (11 Sep 2023 22:49)  HR: 140 (12 Sep 2023 01:00) (89 - 176)  BP: 110/81 (12 Sep 2023 01:00) (89/54 - 113/76)  BP(mean): --  RR: 17 (12 Sep 2023 01:00) (17 - 24)  SpO2: 96% (12 Sep 2023 01:00) (89% - 98%)    Parameters below as of 12 Sep 2023 01:00  Patient On (Oxygen Delivery Method): nasal cannula  O2 Flow (L/min): 4        MEDICATIONS:  MEDICATIONS  (STANDING):  dextrose 5%. 1000 milliLiter(s) (100 mL/Hr) IV Continuous <Continuous>  dextrose 5%. 1000 milliLiter(s) (50 mL/Hr) IV Continuous <Continuous>  dextrose 50% Injectable 25 Gram(s) IV Push once  dextrose 50% Injectable 12.5 Gram(s) IV Push once  dextrose 50% Injectable 25 Gram(s) IV Push once  enoxaparin Injectable 40 milliGRAM(s) SubCutaneous every 12 hours  gabapentin 300 milliGRAM(s) Oral every 8 hours  glucagon  Injectable 1 milliGRAM(s) IntraMuscular once  insulin lispro (ADMELOG) corrective regimen sliding scale   SubCutaneous Before meals and at bedtime  metFORMIN 500 milliGRAM(s) Oral two times a day  methylPREDNISolone 32 milliGRAM(s) Oral daily  metoprolol tartrate 12.5 milliGRAM(s) Oral every 8 hours  pantoprazole    Tablet 40 milliGRAM(s) Oral before breakfast  polyethylene glycol 3350 17 Gram(s) Oral daily  sodium chloride 1 Gram(s) Oral three times a day  trimethoprim  160 mG/sulfamethoxazole 800 mG 1 Tablet(s) Oral daily    MEDICATIONS  (PRN):  acetaminophen     Tablet .. 650 milliGRAM(s) Oral every 6 hours PRN Temp greater or equal to 38C (100.4F), Mild Pain (1 - 3)  benzocaine/menthol Lozenge 1 Lozenge Oral every 4 hours PRN Sore Throat  dextrose Oral Gel 15 Gram(s) Oral once PRN Blood Glucose LESS THAN 70 milliGRAM(s)/deciliter      ALLERGIES:  Allergies    No Known Allergies    Intolerances        LABS:                        16.6   12.60 )-----------( 108      ( 11 Sep 2023 05:30 )             49.0     09-11    129<L>  |  96  |  38<H>  ----------------------------<  141<H>  4.5   |  22  |  0.82    Ca    8.9      11 Sep 2023 05:30  Phos  3.2     09-11  Mg     2.2     09-11        Urinalysis Basic - ( 11 Sep 2023 05:30 )    Color: x / Appearance: x / SG: x / pH: x  Gluc: 141 mg/dL / Ketone: x  / Bili: x / Urobili: x   Blood: x / Protein: x / Nitrite: x   Leuk Esterase: x / RBC: x / WBC x   Sq Epi: x / Non Sq Epi: x / Bacteria: x      CAPILLARY BLOOD GLUCOSE      POCT Blood Glucose.: 143 mg/dL (11 Sep 2023 22:29)      RADIOLOGY & ADDITIONAL TESTS: Reviewed.

## 2023-09-12 NOTE — PROGRESS NOTE ADULT - PROBLEM SELECTOR PLAN 1
Pt states she had afib3 month ago and took 1 month of toprol.  Pt has episodes of afib 9/10 o/n and returned to sinus s/p Lopressor 10IVP. PE negative on CT angio. On 9/11 evening, pt received 2L NS and was in NSR then developed SVT with HR to 170s, unresponsive to vagal maneuvers. Given lopressor 5 IVP with HR decreasing to 130s. -70s with intermittent readings of 70/40s. Endorsing palpitations but no CP/SOB.     Plan:  - telemetry  - c/w tartrate 12.5 q8 PO with hold for 90/60/60 for now  - c/w 1L NS for low BPs after receiving IV lopressor  - cards following, appreciate recs  - consider provoking factors for tachyarrhytmia: lingular pna, hemoconcentration, cellcept, adrenal insufficiency iso steroid taper  - f/u US b/l LE

## 2023-09-12 NOTE — PROGRESS NOTE ADULT - ASSESSMENT
62 year old female with a past medical history of afib, and sciatica, who presented to [facility] for shortness of breath. Pulmonary consulted for acute hypoxic respiratory failure.     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

## 2023-09-12 NOTE — CHART NOTE - NSCHARTNOTEFT_GEN_A_CORE
Patient initial overnight evaluation ~10:30pm, noted no new cardiopulmonary complaints with HR 89 /76 saturating 96% on NC, ECG sinus rhythm. Course subsequently complicated by SVT/AVNRT to 170s ~ 1145pm - with complaints of subjective fever and diaphoresis, afebrile on oral temperature, refusing rectal temperature check at that time. Received lopressor 5mg IVP with improvement in HR to 140s - repeat labs notable for leukocytosis, preserved renal function with an elevated lactate - bedside limited TTE performed with preserved LVEF and no significant valvular abnormalities - rhythm converted back to NSR at 86bpm, with borderline blood pressure - concern for possible underlying infectious process given immunocompromised state s/p Cellcept and currently on steroids. Plan to continue current rate control regimen with hold parameters (SBP <100 or HR <60) and epimeric coverage given possible CT PE findings concerning of possible new focal PNA (lingula) and bilateral ground glass opacities. Cardiology consult team to continue to follow. Patient initial overnight evaluation ~10:30pm, noted no new cardiopulmonary complaints with HR 89 /76 saturating 96% on NC, ECG sinus rhythm. Course subsequently complicated by SVT/AVNRT to 170s ~ 1145pm - with complaints of subjective fever and diaphoresis, afebrile on oral temperature, refusing rectal temperature check at that time. Received lopressor 5mg IVP with improvement in HR to 140s - repeat labs notable for leukocytosis, preserved renal function with an elevated lactate - bedside limited TTE performed with preserved LVEF and no significant valvular abnormalities - rhythm converted back to NSR at 86bpm, with borderline blood pressure - concern for possible underlying infectious process given immunocompromised state s/p Cellcept and currently on steroids. Plan to continue current rate control regimen with hold parameters (SBP <100 or HR <60) and epimeric antibiotic coverage  given new focal PNA (lingula) and bilateral ground glass opacities on recent CT PE study. Cardiology consult team to continue to follow.

## 2023-09-12 NOTE — PROGRESS NOTE ADULT - PROBLEM SELECTOR PLAN 4
STABLE. BL hand and arm numbness 2/2 to radiculopathy. Patient states that numbness in the BL UE has been increasing due to sleeping on hospital mattress. Home meds prednisone 20mg BID, gabapentin 100g TID. PT consult 8/29 needed no additional PT needs. Seen by neuro.     - c/w gabapentin 300 mg TID

## 2023-09-12 NOTE — PROGRESS NOTE ADULT - PROBLEM SELECTOR PLAN 3
Unclear etiology, likely myositis since pt has been having worsening weakness with movement of extremities vs Cellcept s/e. Symptoms improving subjectively.     - neuro consulted, now signed off  - follow up with Dr. Nik Marie or Dante Urbano for EMG upon discharge. Contact: [phone]

## 2023-09-12 NOTE — PROGRESS NOTE ADULT - ATTENDING COMMENTS
Transferred to tele last night in setting of rapid A fib. No change from pulm POV, cont on steroids and CT had shown improvement in ganga parenchymal lung disease. Will cont to follow w you.

## 2023-09-12 NOTE — PROGRESS NOTE ADULT - PROBLEM SELECTOR PLAN 3
Unclear etiology, likely myositis since pt has been having worsening weakness with movement of extremities vs Cellcept s/e. Symptoms improving subjectively.     - neuro consulted, now signed off  - follow up with Dr. Nik Marie or Dante Urbano for EMG upon discharge. Contact: [phone]

## 2023-09-12 NOTE — PROGRESS NOTE ADULT - ASSESSMENT
62 year old female with a past medical history of afib, and sciatica, who presented to [facility] for shortness of breath found to be hypoxic and have interstitial lung disease, admitted for AHRF, further ILD workup and management; course c/b tachyarrhytmia from AF w RVR and SVT, now transferred to telemetry.

## 2023-09-12 NOTE — PROGRESS NOTE ADULT - NUTRITIONAL ASSESSMENT
This patient has been assessed with a concern for Malnutrition and has been determined to have a diagnosis/diagnoses of Morbid obesity (BMI > 40).    This patient is being managed with:   Diet Regular-  Entered: Sep  9 2023  5:16PM        HR sustained in 140s-150, gave another lopressor 5mg IVP x1. HR 80s, BP soft, converted to NSR. bedside TTE by cards fellow wnl. desatting to 80s on 6L NC, improved to 95+ on NRB, attempted BiPAP/HFNC but pt prefers NRB and is satting well, CXR relatively unchanged possibly slightly more interstitial markings. sent BCx x2, ordered UA. Started empiric vancomycin and zosyn. This patient has been assessed with a concern for Malnutrition and has been determined to have a diagnosis/diagnoses of Morbid obesity (BMI > 40).    This patient is being managed with:   Diet Regular-  Entered: Sep  9 2023  5:16PM

## 2023-09-12 NOTE — PROGRESS NOTE ADULT - PROBLEM SELECTOR PLAN 6
F: S/p 1L NS  E: If k>4 or mag>2 replete prn  N: dash diet  G: protonix  DVT: lovenox  Dispo; Telemetry

## 2023-09-12 NOTE — PROGRESS NOTE ADULT - ASSESSMENT
62 year old female with a past medical history of afib, and sciatica, who presented to [facility] for shortness of breath found to be hypoxic with CT scan consistent with ILD, s/p bronchoscopy with pathology suggestive of organizing pneumonia, cultures negative to date, currently treated with medrol 40 mg qday.  Patient with tachycardia with atrial fib, transferred to teemetry, heart rate better controlled at this [email or web address]  Patient with improvement in   CT findings, Improved/decreased extent of bilateral groundglass opacities and reticular markings compared to the previous study. Findings are nonspecific but differential etiologies include interstitial pneumonia,   drug toxicity, nonspecific connective tissue disorders.  Patient noted to have strongly positive ARIANA, RNP, and anti smith antibodies with low C4 and normal C3. Patient with negative SSa and SSb, negative DsDNa, and myomarker panel pending.  Would continue steroids as per pulmonary, discussed initiation of plaquenil when acute tachyarrhythmia better controlled, would check G6pd as well.  Would repeat C3 and C4 as well. In addition, discussed addition of steroid sparing agent, patient hesitant to restart mycophenolate at this time, discussed possible addition of azathioprine, would recommend checking TPMT enzyme as per the 2023 ACR Guidelines for the treatment of ILD in people with systemic autoimmune diseases (August 12, 2023) pending myomarker panel results. Possible rituximab vs tocilizumab as outpatient.  Please obtain quantiferon if not already completed. Will continue to follow with you, please call [phone] for any questions or concerns.

## 2023-09-12 NOTE — PROGRESS NOTE ADULT - ATTENDING COMMENTS
Patient is a 62 F w/ PMHX of AFib (Deferred BB  and AC) who p/w progressive chronic MANZANO since March found to be in Hypoxic Respiratory Failure in the setting of organizing pneumonia and NSIP 2/2 CTD (highly elevated anti-RNP), suspicion for ILD, with hospital course complicated by Afib with RVR for which Cardiology reconsulted    REVIEW OF STUDIES  - TTE 2021 - normal RV/LV fxn w/ mild LVH   - TTE 7/2023 [place]: nl lv fxn w/ normal diastology, normal systolic fxn, normal atria and no sig valve dz  - EKG 8/26: Sinus Rhythm w/ low voltage. non specific T wave flattening  - EKG 9/11/23: Afib with RVR at rate of 142  - EKG 9/12/23 am: SVT at a rate of 170    # Paroxysmal Atrial Fibrillation with RVR  - Patient diagnosed with AF a month prior to this hospitalization, recommended a Beta Blocker and AC  as an outpatient which she has deferred; Seen earlier this hospitalization at which time she maintained she would like to defer BB therapy.  - Clinical course notable for Afib with RVR with subsequent SVT in the 170's. Patient subsequently had acute decline breathing status with worsening Hypoxia requiring Higher oxygen requirements and transfer to to telemetry. She has since cardioverted back to NSR after Lopressor IVP  - Patient deferred Physical Exam and Participation in ROS today as she expressed desire to rest  - At this time suspect Hypoxia could have been secondary to a Brief Flash Pulmonary Edema episode in the setting of Afib with RVR and subsequent SVT with rate in the 170's.  - Agree with primary's team to pursue NIPPV. Patient would like to use the BIPAP for an hour every couple hours   - Patient is at risk recurrent AF given underlying lung disease and Hypoxia, would continue with Lopressor 12.5 mg PO q/8 with goal to uptitrate to q/6 as tolerated with strict holding parameters. Would than switch patient to Toprol 50 mg po daily starting 9/13 am for sustained BB effect  - [id number] of +1 for Female Sex. Patient will turn 63 in a few months (CHADVASC of 2), as such she would required AC which is is currently deferring. If her thromboembolic risk increases, she would like to have a Watchman and would like to defer indefinite AC  - Cardiology will continue to follow, please call with any questions

## 2023-09-12 NOTE — PROGRESS NOTE ADULT - ASSESSMENT
The patient is a 62 year old female with a past medical history of afib, and sciatica, who presented to [facility] for shortness of breath. Being treated for ILD. ICU consulted i/s/o A-fib with RVR v SVT sustained to 160s on RMF.    #A-fib w/ RVR; concern for SVT/AVNRT  Pt noted to have HRs in 140s-160s while on regional medical floors, decreased to 130s and sustained throughout day. Pt states she believes her sx are attributed to Cellcept which she began receiving 9/9. Pt afebrile TMax of 100.2 9/10. ICU re-consulted this evening i/s/o elevated HR to 170s despite recieving 2L NS this afternoon. Pt was resumed on oral beta blocker however had no taken her PO dose yet. Electrolytes on recent blood work WNL. MAPs >65 on bedside monitor, though still tachycardic to 160s BP of 120s/80s. Unclear connection between Mycophenolate and tachycardia, as the incidence of irregular tachycardia has not been thoroughly reported/studied. Cardiology at bedside, recommending resuming PO beta blocker and using IV as well to control HR.     Plan:   - Per Cardiology, pt should begin Metoprolol tartrate -- first dose now and also give IV Lopressor 2.5mg IV once now with additional available for possible repeat to control  HR. Per cardiology fellow, ECG possibly concerning for AVNRT.   - Continue with telemetry monitoring , can increase beta blocker dose depending on response to H  - Recommend transfer to [facility] telemetry unit -- patient amenable at this time       Dispo: [facility]  Discussed w/ Dr. Rouse

## 2023-09-12 NOTE — PROGRESS NOTE ADULT - ASSESSMENT
62 year old female with a past medical history of afib, and sciatica, who presented to [facility] for shortness of breath found to be hypoxic and have interstitial lung disease, admitted for AHRF, further ILD workup and management; course c/b tachyarrhytmia from AF w RVR and SVT, now transferred to telemetry.

## 2023-09-12 NOTE — PROGRESS NOTE ADULT - PROBLEM SELECTOR PLAN 1
Pt states she had afib3 month ago and took 1 month of toprol.  Pt has episodes of afib 9/10 o/n and returned to sinus s/p Lopressor 10IVP. PE negative on CT angio. On 9/11 evening, pt received 2L NS and was in NSR then developed SVT with HR to 170s, unresponsive to vagal maneuvers. Given lopressor 5 IVP with HR decreasing to 130s. -70s with intermittent readings of 70/40s. Endorsing palpitations but no CP/SOB. Administered 1L NS bolus. Received additional lopressor 5mg IVP x1 which converted her to NSR. BP remained soft. Bedside TTE by shana fellow wnl.     Plan:  - telemetry  - c/w tartrate 12.5 q8 PO with hold for 90/60/60 for now  - cards following, appreciate recs  - consider provoking factors for tachyarrhytmia: lingular pna, hemoconcentration, cellcept, adrenal insufficiency iso steroid taper  - f/u US b/l LE

## 2023-09-12 NOTE — PROGRESS NOTE ADULT - PROBLEM SELECTOR PLAN 1
Pt states she had afib3 month ago and took 1 month of toprol.  Pt has episodes of afib 9/10 o/n and returned to sinus s/p Lopressor 10IVP. PE negative on CT angio. On 9/11 evening, pt received 2L NS and was in NSR then developed SVT with HR to 170s, unresponsive to vagal maneuvers. Given lopressor 5 IVP with HR decreasing to 130s. -70s with intermittent readings of 70/40s. Endorsing palpitations but no CP/SOB. Received additional lopressor 5mg IVP x1 which converted her to NSR. Bedside TTE by shana fellow roman.     Plan:  - telemetry  - c/w tartrate 12.5 q8 PO with hold for 90/60/60 for now  - c/w 1L NS for low BPs after receiving IV lopressor  - cards following, appreciate recs  - consider provoking factors for tachyarrhytmia: lingular pna, hemoconcentration, cellcept, adrenal insufficiency iso steroid taper  - f/u US b/l LE Pt states she had afib3 month ago and took 1 month of toprol.  Pt has episodes of afib 9/10 o/n and returned to sinus s/p Lopressor 10IVP. PE negative on CT angio. On 9/11 evening, pt received 2L NS and was in NSR then developed SVT with HR to 170s, unresponsive to vagal maneuvers. Given lopressor 5 IVP with HR decreasing to 130s. -70s with intermittent readings of 70/40s. Endorsing palpitations but no CP/SOB. Administered 1L NS bolus. Received additional lopressor 5mg IVP x1 which converted her to NSR. BP remained soft. Bedside TTE by shana fellow wnl.     Plan:  - telemetry  - c/w tartrate 12.5 q8 PO with hold for 90/60/60 for now  - cards following, appreciate recs  - consider provoking factors for tachyarrhytmia: lingular pna, hemoconcentration, cellcept, adrenal insufficiency iso steroid taper  - f/u US b/l LE

## 2023-09-12 NOTE — PROGRESS NOTE ADULT - PROBLEM SELECTOR PLAN 3
Unclear etiology, likely myositis since pt has been having worsening weakness with movement of extremities vs Cellcept s/e. Symptoms improving subjectively.     - neuro consulted, now signed off  - follow up with Dr. Nik Marie or Dante Urbano for EMG upon discharge. Contact: [phone]

## 2023-09-12 NOTE — PROGRESS NOTE ADULT - SUBJECTIVE AND OBJECTIVE BOX
ALIZA FOLEY, 62y, Female  MRN-[id number]    ****ICU CONSULT NOTE****  HPI: for shortness of breath. Pt stated that she went to the urgent care prior to admission where she had an XR concerning for b/l lower infiltrates. She stated that she could only walk 12-15 feet before feeling short of breath. She stated that she has a pulse ox at home showing readings of 82%. She stated that she had chronic SOB for several months and got worked up in [place] where she had a CT scan that was negative for PE. She also stated that she was seen by pulmonology and rheumatology, where they ruled out lupus and other immunological disorders. Pt states that she has a cough with no sputum. Pt denies any uri sxs, chest pain, pain with breathing, abdominal pain, nvd, headaches. While at [facility] patient was underwent bronchoscopy with BAL and biopsy and pulse steroids. ICU consulted i/s/o elevated HR this evening sustained in 170s which also occurred earlier this afternoon and AM.     Pt seen/examined at bedside. Pt AAOx3 and conversing fully and comfortable in bed. Pt denies headaches, blurry vision, dizziness, SOB, CP, abdominal pain. Admits to mild palpitations States she has received Cellcept over the weekend and attributes that medication to her a-fib with RVR. Pt states she was initially offered a B-Blocker and declined but is open to trying it now.       SUBJECTIVE:    12 Point ROS Negative unless noted otherwise above.  -------------------------------------------------------------------------------  VITAL SIGNS:  Vital Signs Last 24 Hrs  T(C): 37.2 (11 Sep 2023 22:49), Max: 37.2 (11 Sep 2023 22:49)  T(F): 98.9 (11 Sep 2023 22:49), Max: 98.9 (11 Sep 2023 22:49)  HR: 176 (11 Sep 2023 23:56) (89 - 176)  BP: 110/81 (11 Sep 2023 23:56) (89/54 - 113/76)  BP(mean): --  RR: 17 (11 Sep 2023 23:56) (17 - 24)  SpO2: 96% (11 Sep 2023 22:49) (89% - 98%)    Parameters below as of 11 Sep 2023 22:49  Patient On (Oxygen Delivery Method): nasal cannula  O2 Flow (L/min): 4    I&O's Summary      PHYSICAL EXAM:    General: NAD; sitting up in bed speaking in full sentences   HEENT: NC/AT  Cardiovascular: tachycardic; regular rhythm;  +S1/S2; NO M/R/G  Respiratory: CTA B/L; no W/R/R  Gastrointestinal: soft, NT/ND; +BSx4  Extremities: WWP; no edema or cyanosis  Vascular: 2+ radial, DP/PT pulses B/L  Neurological: AAOx3; no focal deficits    ALLERGIES:  Allergies    No Known Allergies    Intolerances        MEDICATIONS:  MEDICATIONS  (STANDING):  dextrose 5%. 1000 milliLiter(s) (50 mL/Hr) IV Continuous <Continuous>  dextrose 5%. 1000 milliLiter(s) (100 mL/Hr) IV Continuous <Continuous>  dextrose 50% Injectable 25 Gram(s) IV Push once  dextrose 50% Injectable 12.5 Gram(s) IV Push once  dextrose 50% Injectable 25 Gram(s) IV Push once  enoxaparin Injectable 40 milliGRAM(s) SubCutaneous every 12 hours  gabapentin 300 milliGRAM(s) Oral every 8 hours  glucagon  Injectable 1 milliGRAM(s) IntraMuscular once  insulin lispro (ADMELOG) corrective regimen sliding scale   SubCutaneous Before meals and at bedtime  metFORMIN 500 milliGRAM(s) Oral two times a day  methylPREDNISolone 32 milliGRAM(s) Oral daily  metoprolol tartrate 12.5 milliGRAM(s) Oral every 8 hours  metoprolol tartrate Injectable 5 milliGRAM(s) IV Push once  pantoprazole    Tablet 40 milliGRAM(s) Oral before breakfast  polyethylene glycol 3350 17 Gram(s) Oral daily  sodium chloride 1 Gram(s) Oral three times a day  sodium chloride 0.9% Bolus 1000 milliLiter(s) IV Bolus once  trimethoprim  160 mG/sulfamethoxazole 800 mG 1 Tablet(s) Oral daily    MEDICATIONS  (PRN):  acetaminophen     Tablet .. 650 milliGRAM(s) Oral every 6 hours PRN Temp greater or equal to 38C (100.4F), Mild Pain (1 - 3)  benzocaine/menthol Lozenge 1 Lozenge Oral every 4 hours PRN Sore Throat  dextrose Oral Gel 15 Gram(s) Oral once PRN Blood Glucose LESS THAN 70 milliGRAM(s)/deciliter      -------------------------------------------------------------------------------  LABS:                        16.6   12.60 )-----------( 108      ( 11 Sep 2023 05:30 )             49.0     09-11    129<L>  |  96  |  38<H>  ----------------------------<  141<H>  4.5   |  22  |  0.82    Ca    8.9      11 Sep 2023 05:30  Phos  3.2     09-11  Mg     2.2     09-11          Urinalysis Basic - ( 11 Sep 2023 05:30 )    Color: x / Appearance: x / SG: x / pH: x  Gluc: 141 mg/dL / Ketone: x  / Bili: x / Urobili: x   Blood: x / Protein: x / Nitrite: x   Leuk Esterase: x / RBC: x / WBC x   Sq Epi: x / Non Sq Epi: x / Bacteria: x      CAPILLARY BLOOD GLUCOSE      POCT Blood Glucose.: 143 mg/dL (11 Sep 2023 22:29)      SARS-CoV-2: NotDetec (10 Sep 2023 11:24)  SARS-CoV-2: NotDetec (26 Aug 2023 12:55)      RADIOLOGY & ADDITIONAL TESTS: Reviewed.

## 2023-09-12 NOTE — PROGRESS NOTE ADULT - ATTENDING COMMENTS
ILD with acute hypoxemic respiratory failure, hemoconcentration, rapid AF/SVT  physical as above  rales  POCUS with B lines diffusely although this may be from her ILD  CXR with no change  with hypoxemia would assume flash pulmonary edema with fluids and tachycardia  will give dose lasix  DC salt tabs  HFNC with CPAP one hour q 4h while awake

## 2023-09-12 NOTE — PROGRESS NOTE ADULT - TIME BILLING
as above
Patient seen and examined with house-staff during bedside rounds.  Resident note read, including vitals, physical findings, laboratory data, and radiological reports.   Revisions included below.  Direct personal management at bed side and extensive interpretation of the data.  Plan was outlined and discussed in details with the housestaff.  Decision making of high complexity  Action taken for acute disease activity to reflect the level of care provided:  - medication reconciliation  - review laboratory data  The case was discussed multiple times with internal medicine.  High resolution CT scan with inspiratory expiratory pending proning revealed some reticulation groundglass opacity consistent with either NSIP or hypersensitivity pneumonitis is some evidence of air trapping.  We will proceed with bronchoscopy.  Biopsy might be low yield and might need an open lung biopsy.
case complexity as above
The patient tolerated the procedure.  Continue to follow in the transbronchial bowel but biopsy.  These cell count is nondiagnostic.  Serology were reviewed and discussed with the patient.  Increase prednisone to 40 mg a day.  Wean off the oxygen.  Await rheumatology input  Patient seen and examined with house-staff during bedside rounds.  Resident note read, including vitals, physical findings, laboratory data, and radiological reports.   Revisions included below.  Direct personal management at bed side and extensive interpretation of the data.  Plan was outlined and discussed in details with the housestaff.  Decision making of high complexity  Action taken for acute disease activity to reflect the level of care provided:  - medication reconciliation  - review laboratory data
case complexity as above
Bedside exam and interview    Review of hospital course, labs, vitals, imaging ,  medical records.   Reviewing outside records   Discharge planning on Interdisciplinary rounds   Discussion with consultants and Primary team   Documenting the encounter.

## 2023-09-12 NOTE — PROGRESS NOTE ADULT - SUBJECTIVE AND OBJECTIVE BOX
PULMONARY CONSULT SERVICE FOLLOW-UP NOTE    INTERVAL HPI:  Reviewed chart and overnight events; patient seen and examined at bedside. She is frustrated because she feels like every time she is almost well enough to go home she runs into another issue. Does not have respiratory distress at this time but notes that when she was in Afib she did.     MEDICATIONS:  Pulmonary:    Antimicrobials:  piperacillin/tazobactam IVPB.. 4.5 Gram(s) IV Intermittent every 8 hours  trimethoprim  160 mG/sulfamethoxazole 800 mG 1 Tablet(s) Oral daily  vancomycin  IVPB 1500 milliGRAM(s) IV Intermittent every 12 hours    Anticoagulants:  enoxaparin Injectable 40 milliGRAM(s) SubCutaneous every 12 hours    Cardiac:  metoprolol tartrate 12.5 milliGRAM(s) Oral every 8 hours      Allergies    No Known Allergies    Intolerances        Vital Signs Last 24 Hrs  T(C): 37.1 (12 Sep 2023 13:35), Max: 37.2 (11 Sep 2023 22:49)  T(F): 98.7 (12 Sep 2023 13:35), Max: 98.9 (11 Sep 2023 22:49)  HR: 77 (12 Sep 2023 18:23) (77 - 176)  BP: 106/67 (12 Sep 2023 18:23) (87/61 - 123/74)  BP(mean): 80 (12 Sep 2023 18:23) (66 - 97)  RR: 22 (12 Sep 2023 18:23) (17 - 33)  SpO2: 100% (12 Sep 2023 18:23) (82% - 100%)    Parameters below as of 12 Sep 2023 18:23  Patient On (Oxygen Delivery Method): nasal cannula, high flow  O2 Flow (L/min): 50  O2 Concentration (%): 70    09-11 @ 07:01  -  09-12 @ 07:00  --------------------------------------------------------  IN: 0 mL / OUT: 450 mL / NET: -450 mL    09-12 @ 07:01  -  09-12 @ 19:22  --------------------------------------------------------  IN: 810 mL / OUT: 750 mL / NET: 60 mL          PHYSICAL EXAM:  Constitutional: WD  HEENT: NC/AT; PERRL, anicteric sclera; MMM  Neck: supple  Lymph: No supraclavical LAD or cervical chain LAD  Cardiovascular: +S1/S2, RRR  Respiratory: Faint inspiratory crackles bilaterally more so at the bases   Gastrointestinal: soft, NT/ND  Extremities: WWP; no edema, clubbing or cyanosis  Vascular: 2+ radial and pedal pulses  Neurological: AAOx3; no focal deficits    LABS:      CBC Full  -  ( 12 Sep 2023 06:21 )  WBC Count : 15.40 K/uL  RBC Count : 5.23 M/uL  Hemoglobin : 15.9 g/dL  Hematocrit : 50.0 %  Platelet Count - Automated : 112 K/uL  Mean Cell Volume : 95.6 fl  Mean Cell Hemoglobin : 30.4 pg  Mean Cell Hemoglobin Concentration : 31.8 gm/dL  Auto Neutrophil # : 13.74 K/uL  Auto Lymphocyte # : 0.61 K/uL  Auto Monocyte # : 0.87 K/uL  Auto Eosinophil # : 0.04 K/uL  Auto Basophil # : 0.04 K/uL  Auto Neutrophil % : 89.2 %  Auto Lymphocyte % : 4.0 %  Auto Monocyte % : 5.6 %  Auto Eosinophil % : 0.3 %  Auto Basophil % : 0.3 %    09-12    133<L>  |  104  |  35<H>  ----------------------------<  133<H>  5.2   |  17<L>  |  0.84    Ca    8.1<L>      12 Sep 2023 06:21  Phos  3.9     09-12  Mg     2.0     09-12    TPro  5.0<L>  /  Alb  2.3<L>  /  TBili  1.0  /  DBili  x   /  AST  54<H>  /  ALT  102<H>  /  AlkPhos  108  09-12          Urinalysis Basic - ( 12 Sep 2023 06:21 )    Color: x / Appearance: x / SG: x / pH: x  Gluc: 133 mg/dL / Ketone: x  / Bili: x / Urobili: x   Blood: x / Protein: x / Nitrite: x   Leuk Esterase: x / RBC: x / WBC x   Sq Epi: x / Non Sq Epi: x / Bacteria: x    RADIOLOGY & ADDITIONAL STUDIES:    < from: CT Angio Chest PE Protocol w/ IV Cont (09.11.23 @ 13:45) >  ACC: [id number] EXAM:  CT ANGIO CHEST PULM [facility]   ORDERED BY: DELFINO DRISCOLL     PROCEDURE DATE:  09/11/2023          INTERPRETATION:  CLINICAL INFORMATION: New tachycardia, desaturation    COMPARISON: 8/28/2023    CONTRAST/COMPLICATIONS:  IV Contrast: Isovue 370  90 cc administered   10 cc discarded  Oral Contrast: NONE  Complications: None reported at time of study completion    PROCEDURE:  CT Angiography of the Chest.  Sagittal and coronal reformats were performed as well as 3D (MIP)   reconstructions.    FINDINGS:    LUNGS AND AIRWAYS: Patent central airways.  Bilateral mild groundglass   opacities and increased reticular markings/opacities overall   decreased/improved compared to the previous study. Extending from the   apices to thelung bases and both peripheral and central. No   honeycombing.A new small focus of consolidative changes seen in the   lingula. Otherwise, no new areas of lung disease  PLEURA: No pleural effusion.  MEDIASTINUM AND NIRMALA: No lymphadenopathy.  VESSELS:No pulmonary embolism. No thoracic aortic dissection or aneurysm.  HEART: Heart size is normal. No pericardial effusion.  CHEST WALL AND LOWER NECK: Within normal limits.  VISUALIZED UPPER ABDOMEN: Small hiatal hernia  BONES: Small low-attenuation focus noted in the caudate lobe, stable,   likely a small cyst.    IMPRESSION:  1.  Improved/decreased extent of bilateral groundglass opacities and   reticular markings compared to the previous study. Findings are   nonspecific but differential etiologies include interstitial pneumonia,   drug toxicity, nonspecific connective tissue disorders.  2.  New small focus of parenchymal consolidation seen in the lingula;   possible small focal pneumonia.  3.  No pulmonary embolism        --- End of Report ---    < end of copied text >

## 2023-09-12 NOTE — PROGRESS NOTE ADULT - SUBJECTIVE AND OBJECTIVE BOX
Rheumatology Follow up  Patient seen and examined at bedside.  Patient transferred to telemetry given persistent tachycardia, atrial fibrillation. At this time, patient with heart rate in the 80's, better controlled. Currently taking medrol 40 mg qday, following treatment with 60 mg IV q6 solumedrol.  Patient started on cellcept 500 mg bid, but developed tachycardia and was discontinued. Patient was restarted on antibiotics given concern for possible pneumonia on repeat CT scan,  Repeat CT scan negative for PE, Dopplers negative for DVT.  Reviewed results with patient  Reviewed previous symptoms prior to hospitalization, bilateral hand pain with paresthesias, started on prednisone 20 mg bid prior to admission  +rash on chest wall  Heaviness of legs without muscle weakness  Decrease exercise tolerance from previous  Leukopenia  No Raynauds  + oral ulcers  Labs: +ARIANA, +RNP, +smith, negative DsDNA, Negative SSa and SSb, negative ANCA, C3 normal, C4 12  Bronchoscopy pathology: evidence of organizing pneumonia, cultures negative to date    Initial Note:  62 year old female with PMH of afib, and sciatica, who presented to [facility] ED for shortness of breath. Patient reports that she had chronic SOB for several months, evaluated previously in [place] where she had a CT scan in June that was negative for PE. She describes GERD-like symptoms and PND. Mentions a "bronchial infection" in [place] 3-4 months ago and her symptoms have persisted since. Patient was evaluated Pt states that she has a cough with no sputum. 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 No pets or specific allergies she is aware of. Denies any other environmental or occupational exposures. Denies any family history of lung disease, lung cancer, or autoimmune/rheumatological diseases. No other personal history of pulmonary disease or rheumatologic phenomena although does have digit paresthesias secondary to cervical spine issues for which she was given a short course of steroids. Never smoker. No correlation between symptoms and geographical location. D    PHYSICAL EXAM:  General: tachypnea with prolonged conversation  HEENT: NC/AT; PERRL, anicteric sclera; MMM  Neck: supple  Respiratory: bilateral course breath sounds  Extremities: No active synovitis  Vascular: 2+ radial, DP/PT pulses B/L, cool lower extremities without color changes  Neurological: AAOx3; no focal deficits  Dermatologic: healing lesions on the chest wall, no vesicular lesions.    Vital Signs Last 24 Hrs  T(C): 37.1 (12 Sep 2023 13:35), Max: 37.2 (11 Sep 2023 22:49)  T(F): 98.7 (12 Sep 2023 13:35), Max: 98.9 (11 Sep 2023 22:49)  HR: 78 (12 Sep 2023 16:45) (78 - 176)  BP: 109/71 (12 Sep 2023 13:09) (87/61 - 123/74)  BP(mean): 84 (12 Sep 2023 13:09) (66 - 97)  RR: 24 (12 Sep 2023 16:45) (17 - 33)  SpO2: 96% (12 Sep 2023 16:45) (82% - 99%)    Parameters below as of 12 Sep 2023 16:45  Patient On (Oxygen Delivery Method): HFT  O2 Flow (L/min): 50  O2 Concentration (%): 60    MEDICATIONS:  MEDICATIONS  (STANDING):  dextrose 5%. 1000 milliLiter(s) (100 mL/Hr) IV Continuous <Continuous>  dextrose 5%. 1000 milliLiter(s) (50 mL/Hr) IV Continuous <Continuous>  dextrose 50% Injectable 25 Gram(s) IV Push once  dextrose 50% Injectable 12.5 Gram(s) IV Push once  dextrose 50% Injectable 25 Gram(s) IV Push once  enoxaparin Injectable 40 milliGRAM(s) SubCutaneous every 12 hours  gabapentin 300 milliGRAM(s) Oral every 8 hours  glucagon  Injectable 1 milliGRAM(s) IntraMuscular once  insulin lispro (ADMELOG) corrective regimen sliding scale   SubCutaneous Before meals and at bedtime  lidocaine   4% Patch 1 Patch Transdermal every 24 hours  metFORMIN 500 milliGRAM(s) Oral two times a day  methylPREDNISolone 32 milliGRAM(s) Oral daily  metoprolol tartrate 12.5 milliGRAM(s) Oral every 8 hours  pantoprazole    Tablet 40 milliGRAM(s) Oral before breakfast  piperacillin/tazobactam IVPB.- 4.5 Gram(s) IV Intermittent once  piperacillin/tazobactam IVPB.. 4.5 Gram(s) IV Intermittent every 8 hours  polyethylene glycol 3350 17 Gram(s) Oral daily  sodium chloride 1 Gram(s) Oral three times a day  trimethoprim  160 mG/sulfamethoxazole 800 mG 1 Tablet(s) Oral daily  vancomycin  IVPB 1500 milliGRAM(s) IV Intermittent every 12 hours    MEDICATIONS  (PRN):  acetaminophen     Tablet .. 650 milliGRAM(s) Oral every 6 hours PRN Temp greater or equal to 38C (100.4F), Mild Pain (1 - 3)  benzocaine/menthol Lozenge 1 Lozenge Oral every 4 hours PRN Sore Throat  dextrose Oral Gel 15 Gram(s) Oral once PRN Blood Glucose LESS THAN 70 milliGRAM(s)/deciliter                          15.9   15.40 )-----------( 112      ( 12 Sep 2023 06:21 )             50.0       09-12    133<L>  |  104  |  35<H>  ----------------------------<  133<H>  5.2   |  17<L>  |  0.84    Ca    8.1<L>      12 Sep 2023 06:21  Phos  3.9     09-12  Mg     2.0     09-12    TPro  5.0<L>  /  Alb  2.3<L>  /  TBili  1.0  /  DBili  x   /  AST  54<H>  /  ALT  102<H>  /  AlkPhos  108  09-12        Urinalysis Basic - ( 12 Sep 2023 06:21 )    Color: x / Appearance: x / SG: x / pH: x  Gluc: 133 mg/dL / Ketone: x  / Bili: x / Urobili: x   Blood: x / Protein: x / Nitrite: x   Leuk Esterase: x / RBC: x / WBC x   Sq Epi: x / Non Sq Epi: x / Bacteria: x    CPK 43    ARIANA +,  Dsdna neg  RNP >8  smith >8  SSa and SSb neg  Myomarker panel pending

## 2023-09-12 NOTE — PROGRESS NOTE ADULT - PROBLEM SELECTOR PLAN 2
STABLE.   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 Hb and Hct elevated in setting of chronic sob. CXR revealed ground glass opacity. CTA revealed interstitial lung disease.  Infectious workup negative so far. Hypersensitivity pneumonitis panel wnl. stable. Ambulatory sats have been stable around high 80s/ low 90s with quick recovery to baseline. s/p solu-medrol 60mg course & tapering, now transitioned to PO Methylprednisolone 32mg and started on Cellecept 9/9 but pt reported subjective side effects. RVP negative. Desatted to 80s on NC 6L, placed in NRB due to refusal of BiPAP/HFNC; curently satting ~90%.     Plan:  - d/c Cellecept    - F/u Pulm recommendations   - continue to monitor sxs and obtain ambulatory sats.  - wean O2 as tolerated     #likely LEILA  Indeterminate ANCA, ARIANA (1:1280 speckled), anti-smith (positive), anti-dsDNA (neg), complement levels (C3 normal, C4 low), RF (neg), sjogrens (neg), centromere abs (neg), RF (neg), anti-CCP (neg), anti RNP (elevated; >8), myomarker panel (pending), and systemic sclerosis (neg) STABLE.   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 Hb and Hct elevated in setting of chronic sob. CXR revealed ground glass opacity. CTA revealed interstitial lung disease.  Infectious workup negative so far. Hypersensitivity pneumonitis panel wnl. stable. Ambulatory sats have been stable around high 80s/ low 90s with quick recovery to baseline. s/p solu-medrol 60mg course & tapering, now transitioned to PO Methylprednisolone 32mg and started on Cellecept 9/9 but pt reported subjective side effects. RVP negative. Desatted to 80s on NC 6L, placed in NRB due to refusal of BiPAP/HFNC; curently satting ~90%. Discontinued Cellcept     - Declined cards consult this AM  - Pulm recommendations: 10 mg Lasix IV, urinating well; check I&O  - High flow when not on BiPap: on intermittent BiPap every hour 1 every 4 hours from 6 am to 10 PM  - 25% albumin standing order q8  - Continue to monitor sxs and obtain ambulatory sats.  - Wean O2 as tolerated     #likely LEILA  Indeterminate ANCA, ARIANA (1:1280 speckled), anti-smith (positive), anti-dsDNA (neg), complement levels (C3 normal, C4 low), RF (neg), sjogrens (neg), centromere abs (neg), RF (neg), anti-CCP (neg), anti RNP (elevated; >8), myomarker panel (pending), and systemic sclerosis (neg)

## 2023-09-12 NOTE — PROGRESS NOTE ADULT - ATTENDING COMMENTS
62 F with Afib and sciatica presented with dyspnea, hypoxia, and BL infiltrates. ARIANA was 1:1280, anti-Sm pos, anti-RNP pos. She was diagnosed with MCTD, organizing PNA, and NSIP and started on methylprednisolone and mycophenolate. The patient states she had Afib w RVR after the first dose of mycophenolate and had another episode of the second dose yesterday.   1. SVT  - metoprolol iv and po per Cardiology  2. Afib w RVR  3. ARFH - worsening - likely due to the volume she received  - start BIPAP  4. MCTD  5. OP/NSIP  5. Sepsis? - uptrending WBC, elevated LA  - UA, blood cultures  - start antibiotics

## 2023-09-12 NOTE — PROGRESS NOTE ADULT - SUBJECTIVE AND OBJECTIVE BOX
***Note in progress***    OVERNIGHT EVENTS: NAEO    SUBJECTIVE / INTERVAL HPI: Patient seen and examined at bedside. Patient denying chest pain, SOB, palpitations, cough. Patient denies fever, chills, HA, Dizziness, N/V, abdominal pain, diarrhea, constipation, hematochezia/melena, dysuria, hematuria, new onset weakness/numbness, LE pain and/or swelling.    Remaining ROS negative       PHYSICAL EXAM:    General:NAD.   HEENT: NC/AT; PERRL, anicteric sclera; MMM  Neck: supple  Cardiovascular: +S1/S2, RRR  Respiratory: CTA B/L; no W/R/R  Gastrointestinal: soft, NT/ND; +BSx4  Extremities: WWP; no edema, clubbing or cyanosis  Vascular: 2+ radial, DP/PT pulses B/L  Neurological: AAOx3; no focal deficits  Psychiatric: pleasant mood and affect  Dermatologic: no appreciable wounds or damage to the skin    VITAL SIGNS:  Vital Signs Last 24 Hrs  T(C): 37 (12 Sep 2023 01:00), Max: 37.2 (11 Sep 2023 22:49)  T(F): 98.6 (12 Sep 2023 01:00), Max: 98.9 (11 Sep 2023 22:49)  HR: 82 (12 Sep 2023 06:42) (82 - 176)  BP: 105/63 (12 Sep 2023 06:42) (87/61 - 121/80)  BP(mean): 74 (12 Sep 2023 06:42) (66 - 97)  RR: 20 (12 Sep 2023 06:42) (17 - 24)  SpO2: 92% (12 Sep 2023 06:42) (82% - 99%)    Parameters below as of 12 Sep 2023 06:42  Patient On (Oxygen Delivery Method): mask, nonrebreather    O2 Concentration (%): 100      MEDICATIONS:  MEDICATIONS  (STANDING):  dextrose 5%. 1000 milliLiter(s) (100 mL/Hr) IV Continuous <Continuous>  dextrose 5%. 1000 milliLiter(s) (50 mL/Hr) IV Continuous <Continuous>  dextrose 50% Injectable 25 Gram(s) IV Push once  dextrose 50% Injectable 12.5 Gram(s) IV Push once  dextrose 50% Injectable 25 Gram(s) IV Push once  enoxaparin Injectable 40 milliGRAM(s) SubCutaneous every 12 hours  gabapentin 300 milliGRAM(s) Oral every 8 hours  glucagon  Injectable 1 milliGRAM(s) IntraMuscular once  insulin lispro (ADMELOG) corrective regimen sliding scale   SubCutaneous Before meals and at bedtime  lidocaine   4% Patch 1 Patch Transdermal every 24 hours  metFORMIN 500 milliGRAM(s) Oral two times a day  methylPREDNISolone 32 milliGRAM(s) Oral daily  metoprolol tartrate 12.5 milliGRAM(s) Oral every 8 hours  pantoprazole    Tablet 40 milliGRAM(s) Oral before breakfast  piperacillin/tazobactam IVPB.- 4.5 Gram(s) IV Intermittent once  piperacillin/tazobactam IVPB.. 4.5 Gram(s) IV Intermittent every 8 hours  polyethylene glycol 3350 17 Gram(s) Oral daily  sodium chloride 1 Gram(s) Oral three times a day  trimethoprim  160 mG/sulfamethoxazole 800 mG 1 Tablet(s) Oral daily  vancomycin  IVPB 1500 milliGRAM(s) IV Intermittent every 12 hours    MEDICATIONS  (PRN):  acetaminophen     Tablet .. 650 milliGRAM(s) Oral every 6 hours PRN Temp greater or equal to 38C (100.4F), Mild Pain (1 - 3)  benzocaine/menthol Lozenge 1 Lozenge Oral every 4 hours PRN Sore Throat  dextrose Oral Gel 15 Gram(s) Oral once PRN Blood Glucose LESS THAN 70 milliGRAM(s)/deciliter      ALLERGIES:  Allergies    No Known Allergies    Intolerances        LABS:                        15.9   15.40 )-----------( 112      ( 12 Sep 2023 06:21 )             50.0     09-12    130<L>  |  101  |  34<H>  ----------------------------<  157<H>  4.5   |  18<L>  |  0.75    Ca    8.2<L>      12 Sep 2023 01:59  Phos  3.3     09-12  Mg     2.0     09-12    TPro  5.4<L>  /  Alb  2.5<L>  /  TBili  1.0  /  DBili  x   /  AST  40  /  ALT  91<H>  /  AlkPhos  110  09-12      Urinalysis Basic - ( 12 Sep 2023 01:59 )    Color: x / Appearance: x / SG: x / pH: x  Gluc: 157 mg/dL / Ketone: x  / Bili: x / Urobili: x   Blood: x / Protein: x / Nitrite: x   Leuk Esterase: x / RBC: x / WBC x   Sq Epi: x / Non Sq Epi: x / Bacteria: x      CAPILLARY BLOOD GLUCOSE      POCT Blood Glucose.: 129 mg/dL (12 Sep 2023 06:55)      RADIOLOGY & ADDITIONAL TESTS: Reviewed. OVERNIGHT EVENTS: Increased oxygen requirement overnight    SUBJECTIVE / INTERVAL HPI: Patient seen and examined at bedside. Patient complains of ongoing SOB and cough and requiring increased oxygen overnight. She denies chest pain and palpitations. Patient denies fever, chills, HA, Dizziness, N/V, abdominal pain, diarrhea, constipation, hematochezia/melena, dysuria, hematuria, new onset weakness/numbness, LE pain and/or swelling.    PHYSICAL EXAM:  General: NAD  HEENT: NC/AT; PERRL, anicteric sclera; MMM  Neck: supple  Cardiovascular: +S1/S2, RRR  Respiratory: bilateral course breath sounds  Gastrointestinal: soft, NT/ND; +BSx4  Extremities: WWP; +pitting edema bilaterally, no clubbing or cyanosis  Vascular: 2+ radial, DP/PT pulses B/L  Neurological: AAOx3; no focal deficits  Psychiatric: pleasant mood and affect  Dermatologic: no appreciable wounds or damage to the skin    Vital Signs Last 24 Hrs  T(C): 37.1 (12 Sep 2023 13:35), Max: 37.2 (11 Sep 2023 22:49)  T(F): 98.7 (12 Sep 2023 13:35), Max: 98.9 (11 Sep 2023 22:49)  HR: 78 (12 Sep 2023 16:45) (78 - 176)  BP: 109/71 (12 Sep 2023 13:09) (87/61 - 123/74)  BP(mean): 84 (12 Sep 2023 13:09) (66 - 97)  RR: 24 (12 Sep 2023 16:45) (17 - 33)  SpO2: 96% (12 Sep 2023 16:45) (82% - 99%)    Parameters below as of 12 Sep 2023 16:45  Patient On (Oxygen Delivery Method): HFT  O2 Flow (L/min): 50  O2 Concentration (%): 60    MEDICATIONS:  MEDICATIONS  (STANDING):  dextrose 5%. 1000 milliLiter(s) (100 mL/Hr) IV Continuous <Continuous>  dextrose 5%. 1000 milliLiter(s) (50 mL/Hr) IV Continuous <Continuous>  dextrose 50% Injectable 25 Gram(s) IV Push once  dextrose 50% Injectable 12.5 Gram(s) IV Push once  dextrose 50% Injectable 25 Gram(s) IV Push once  enoxaparin Injectable 40 milliGRAM(s) SubCutaneous every 12 hours  gabapentin 300 milliGRAM(s) Oral every 8 hours  glucagon  Injectable 1 milliGRAM(s) IntraMuscular once  insulin lispro (ADMELOG) corrective regimen sliding scale   SubCutaneous Before meals and at bedtime  lidocaine   4% Patch 1 Patch Transdermal every 24 hours  metFORMIN 500 milliGRAM(s) Oral two times a day  methylPREDNISolone 32 milliGRAM(s) Oral daily  metoprolol tartrate 12.5 milliGRAM(s) Oral every 8 hours  pantoprazole    Tablet 40 milliGRAM(s) Oral before breakfast  piperacillin/tazobactam IVPB.- 4.5 Gram(s) IV Intermittent once  piperacillin/tazobactam IVPB.. 4.5 Gram(s) IV Intermittent every 8 hours  polyethylene glycol 3350 17 Gram(s) Oral daily  sodium chloride 1 Gram(s) Oral three times a day  trimethoprim  160 mG/sulfamethoxazole 800 mG 1 Tablet(s) Oral daily  vancomycin  IVPB 1500 milliGRAM(s) IV Intermittent every 12 hours    MEDICATIONS  (PRN):  acetaminophen     Tablet .. 650 milliGRAM(s) Oral every 6 hours PRN Temp greater or equal to 38C (100.4F), Mild Pain (1 - 3)  benzocaine/menthol Lozenge 1 Lozenge Oral every 4 hours PRN Sore Throat  dextrose Oral Gel 15 Gram(s) Oral once PRN Blood Glucose LESS THAN 70 milliGRAM(s)/deciliter      ALLERGIES:  Allergies    No Known Allergies    Intolerances                          15.9   15.40 )-----------( 112      ( 12 Sep 2023 06:21 )             50.0       09-12    133<L>  |  104  |  35<H>  ----------------------------<  133<H>  5.2   |  17<L>  |  0.84    Ca    8.1<L>      12 Sep 2023 06:21  Phos  3.9     09-12  Mg     2.0     09-12    TPro  5.0<L>  /  Alb  2.3<L>  /  TBili  1.0  /  DBili  x   /  AST  54<H>  /  ALT  102<H>  /  AlkPhos  108  09-12              Urinalysis Basic - ( 12 Sep 2023 06:21 )    Color: x / Appearance: x / SG: x / pH: x  Gluc: 133 mg/dL / Ketone: x  / Bili: x / Urobili: x   Blood: x / Protein: x / Nitrite: x   Leuk Esterase: x / RBC: x / WBC x   Sq Epi: x / Non Sq Epi: x / Bacteria: x            CARDIAC MARKERS ( 11 Sep 2023 05:30 )  x     / x     / 43 U/L / x     / x            CAPILLARY BLOOD GLUCOSE      POCT Blood Glucose.: 148 mg/dL (12 Sep 2023 16:11)

## 2023-09-12 NOTE — PROGRESS NOTE ADULT - PROBLEM SELECTOR PLAN 2
STABLE.   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 Hb and Hct elevated in setting of chronic sob. CXR revealed ground glass opacity. CTA revealed interstitial lung disease.  Infectious workup negative so far. Hypersensitivity pneumonitis panel wnl. stable. Ambulatory sats have been stable around high 80s/ low 90s with quick recovery to baseline. s/p solu-medrol 60mg course & tapering, now transitioned to PO Methylprednisolone 32mg and started on Cellecept 9/9 but pt reported subjective side effects.     Plan:  - d/c Cellecept    - F/u Pulm recommendations   - continue to monitor sxs and obtain ambulatory sats.  - wean O2 as tolerated i/s/o o/n events 9/10  - RVP pending    #likely LEILA  Indeterminate ANCA, ARIANA (1:1280 speckled), anti-smith (positive), anti-dsDNA (neg), complement levels (C3 normal, C4 low), RF (neg), sjogrens (neg), centromere abs (neg), RF (neg), anti-CCP (neg), anti RNP (elevated; >8), myomarker panel (pending), and systemic sclerosis (neg)

## 2023-09-12 NOTE — PROCEDURE NOTE - NSUS ED ADDITIONAL DETAIL1 FT
limited due to body habitus
Irregular and thick pleura with "step offs" suggestive of ILD compatible with her known history. Diffuse B-lines. No consolidations or pleural effusions.

## 2023-09-12 NOTE — PROGRESS NOTE ADULT - PROBLEM SELECTOR PLAN 2
ARIANA (1:1280 speckled), anti-smith (positive), complement levels (C3 normal, C4 low), anti RNP (elevated; >8)  SARS COV-2 spike protein antibody positive; CRP and ESR elevated    Hypersensitivity pneumonitis panel negative  Uploaded prior CT chest from 6/2023 that looks essentially normal.  TTE wnl    -Continue with PO methylprednisolone 40 mg PO qd    -Cardiology for evaluation of Afib  -Please maintain euvolemia as patient has suboptimal lung reserve and flash pulmonary edema would quickly lead to respiratory distress  -No significant consolidations on CT concerning for PNA but can empirically treat is clinical suspicion is high per primary   -PT evaluation and document ambulatory saturation if possible; will likely require establishment of home oxygen prior to discharge  -Nutrition counseling and weight loss  -Will require dedicated outpatient pulmonary follow up with Dr. Whitney in 2 weeks from discharge with formal PFTs and a 6MWT.  -Repeat CT chest in 6 weeks.  -Will need to walk with suppl O2 prior to discharge

## 2023-09-12 NOTE — PROGRESS NOTE ADULT - PROBLEM SELECTOR PLAN 1
Improving with IV steroids and is currently on 2-3LPM NC.    Presenting in acute respiratory failure, with imaging should bilateral reticulations and ground glass opacities. Given reticular pattern, in particular in a subpleural pattern with a cranial caudal distrubution, GGOs, and arcade-like sign- this constellation of CT chest findings noted on HRCT favors mainly diagnoses of organizing pneumonia and NSIP 2/2 CTD (highly elevated anti-RNP). Patient may have certainly presented with ILD prior to rheumatological manifestations of MCTD. Her subclinical fever this AM and symptoms are unlikely to be due to Cellcept. She continues to have episodes of afib that is likely contributing to her respiratory distress at times. Her CT chest is improved compared to prior with fewer GGOs and interlobular septal thickening without evidence of consolidation or PE. Can continue on just steroids for now.

## 2023-09-12 NOTE — PROGRESS NOTE ADULT - PROBLEM SELECTOR PLAN 2
STABLE.   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 Hb and Hct elevated in setting of chronic sob. CXR revealed ground glass opacity. CTA revealed interstitial lung disease.  Infectious workup negative so far. Hypersensitivity pneumonitis panel wnl. stable. Ambulatory sats have been stable around high 80s/ low 90s with quick recovery to baseline. s/p solu-medrol 60mg course & tapering, now transitioned to PO Methylprednisolone 32mg and started on Cellecept 9/9 but pt reported subjective side effects.     Plan:  - d/c Cellecept    - F/u Pulm recommendations   - continue to monitor sxs and obtain ambulatory sats.  - wean O2 as tolerated i/s/o o/n events 9/10  - RVP pending    #likely LEILA  Indeterminate ANCA, ARIANA (1:1280 speckled), anti-smith (positive), anti-dsDNA (neg), complement levels (C3 normal, C4 low), RF (neg), sjogrens (neg), centromere abs (neg), RF (neg), anti-CCP (neg), anti RNP (elevated; >8), myomarker panel (pending), and systemic sclerosis (neg) STABLE.   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 Hb and Hct elevated in setting of chronic sob. CXR revealed ground glass opacity. CTA revealed interstitial lung disease.  Infectious workup negative so far. Hypersensitivity pneumonitis panel wnl. stable. Ambulatory sats have been stable around high 80s/ low 90s with quick recovery to baseline. s/p solu-medrol 60mg course & tapering, now transitioned to PO Methylprednisolone 32mg and started on Cellecept 9/9 but pt reported subjective side effects. RVP negative. Desatted to 80s on NC 6L, placed in NRB due to refusal of BiPAP/HFNC; curently satting ~90%.     Plan:  - d/c Cellecept    - F/u Pulm recommendations   - continue to monitor sxs and obtain ambulatory sats.  - wean O2 as tolerated     #likely LEILA  Indeterminate ANCA, ARIANA (1:1280 speckled), anti-smith (positive), anti-dsDNA (neg), complement levels (C3 normal, C4 low), RF (neg), sjogrens (neg), centromere abs (neg), RF (neg), anti-CCP (neg), anti RNP (elevated; >8), myomarker panel (pending), and systemic sclerosis (neg)

## 2023-09-12 NOTE — PROGRESS NOTE ADULT - PROBLEM SELECTOR PLAN 6
F: none  E: If k>4 or mag>2 replete prn  N: dash diet  G: protonix  DVT: lovenox  Dispo; 7uris F: S/p 1L NS  E: If k>4 or mag>2 replete prn  N: dash diet  G: protonix  DVT: lovenox  Dispo; Telemetry

## 2023-09-12 NOTE — PROGRESS NOTE ADULT - PROBLEM SELECTOR PLAN 5
Na 129 9/11. etiology unclear    - f/u Urine Lytes and serum osmolarity and correlate w/ am lab 9/12.

## 2023-09-12 NOTE — PROGRESS NOTE ADULT - ASSESSMENT
62 F w/ PMHX of AFib (off BB and a/c) who p/w progressive chronic MANZANO since march w/ bilateral infiltrates on CT which have progressed over the last few months and now diagnosed with MCTD, organizing PNA, and NSIP, started on steroids and mycophenolate. Cardiology consulted for Afib.    TTE 2021 - normal RV/LV fxn w/ mild LVH   TTE 7/2023 [place]: nl lv fxn w/ normal diastology, nl rv systolix fxn, normal atria and no sig valve dz  EKG 8/26: sins w/ low voltage. non specfic t wave flattening      #Afib  #AVNRT  - patient diagnosed with AF 1 mo ago, was on Toprol x30 days and has since d/c'ed  - CHADSVASC +1 for female, no indication for a/c at this time  - c/w Metoprolol tartrate 12.5mg PO q8hrs as patient is at risk for recurrent AF iso hypoxia         - pt has agreed to 30-day trial of BB        - would transition to Toprol XL 50mg on 9/13 if tolerated by BP  - replete electrolytes so K>4, Mg>2  - f/u with EP/cardiology out-patient (Dr. Cadena)      Please see attending attestation for final recommendations.

## 2023-09-13 ENCOUNTER — APPOINTMENT (OUTPATIENT)
Dept: INTERNAL MEDICINE | Facility: CLINIC | Age: 63
End: 2023-09-13

## 2023-09-13 ENCOUNTER — RESULT REVIEW (OUTPATIENT)
Age: 63
End: 2023-09-13

## 2023-09-13 ENCOUNTER — INPATIENT (INPATIENT)
Facility: HOSPITAL | Age: 63
LOS: 21 days | Discharge: NURSING, REHAB OR LONG-TERM CARE | End: 2023-10-05
Attending: STUDENT IN AN ORGANIZED HEALTH CARE EDUCATION/TRAINING PROGRAM | Admitting: STUDENT IN AN ORGANIZED HEALTH CARE EDUCATION/TRAINING PROGRAM
Payer: COMMERCIAL

## 2023-09-13 VITALS — HEART RATE: 119 BPM | OXYGEN SATURATION: 100 % | RESPIRATION RATE: 14 BRPM

## 2023-09-13 VITALS — OXYGEN SATURATION: 98 % | RESPIRATION RATE: 18 BRPM | HEART RATE: 118 BPM | TEMPERATURE: 98 F

## 2023-09-13 DIAGNOSIS — R29.818 OTHER SYMPTOMS AND SIGNS INVOLVING THE NERVOUS SYSTEM: ICD-10-CM

## 2023-09-13 DIAGNOSIS — J98.9 RESPIRATORY DISORDER, UNSPECIFIED: ICD-10-CM

## 2023-09-13 LAB
% ALBUMIN: 51.1 % — SIGNIFICANT CHANGE UP
% ALPHA 1: 4.8 % — SIGNIFICANT CHANGE UP
% ALPHA 2: 15.7 % — SIGNIFICANT CHANGE UP
% BETA: 8.2 % — SIGNIFICANT CHANGE UP
% GAMMA: 20.2 % — SIGNIFICANT CHANGE UP
ALBUMIN SERPL ELPH-MCNC: 2.6 G/DL — LOW (ref 3.3–5)
ALBUMIN SERPL ELPH-MCNC: 2.8 G/DL — LOW (ref 3.6–5.5)
ALBUMIN SERPL ELPH-MCNC: 3 G/DL — LOW (ref 3.3–5)
ALBUMIN SERPL ELPH-MCNC: 3.6 G/DL — SIGNIFICANT CHANGE UP (ref 3.3–5)
ALBUMIN SERPL ELPH-MCNC: 3.9 G/DL — SIGNIFICANT CHANGE UP (ref 3.3–5)
ALBUMIN/GLOB SERPL ELPH: 1 RATIO — SIGNIFICANT CHANGE UP
ALP SERPL-CCNC: 126 U/L — HIGH (ref 40–120)
ALP SERPL-CCNC: 72 U/L — SIGNIFICANT CHANGE UP (ref 40–120)
ALP SERPL-CCNC: 75 U/L — SIGNIFICANT CHANGE UP (ref 40–120)
ALP SERPL-CCNC: 94 U/L — SIGNIFICANT CHANGE UP (ref 40–120)
ALPHA1 GLOB SERPL ELPH-MCNC: 0.3 G/DL — SIGNIFICANT CHANGE UP (ref 0.1–0.4)
ALPHA2 GLOB SERPL ELPH-MCNC: 0.9 G/DL — SIGNIFICANT CHANGE UP (ref 0.5–1)
ALT FLD-CCNC: 103 U/L — HIGH (ref 10–45)
ALT FLD-CCNC: 681 U/L — HIGH (ref 10–45)
ALT FLD-CCNC: 781 U/L — HIGH (ref 4–33)
ALT FLD-CCNC: 849 U/L — HIGH (ref 4–33)
AMYLASE P1 CFR SERPL: 66 U/L — SIGNIFICANT CHANGE UP (ref 25–125)
ANION GAP SERPL CALC-SCNC: 11 MMOL/L — SIGNIFICANT CHANGE UP (ref 5–17)
ANION GAP SERPL CALC-SCNC: 12 MMOL/L — SIGNIFICANT CHANGE UP (ref 5–17)
ANION GAP SERPL CALC-SCNC: 19 MMOL/L — HIGH (ref 7–14)
ANION GAP SERPL CALC-SCNC: 22 MMOL/L — HIGH (ref 7–14)
APPEARANCE UR: CLEAR — SIGNIFICANT CHANGE UP
APTT BLD: 104.8 SEC — HIGH (ref 24.5–35.6)
APTT BLD: 32.6 SEC — SIGNIFICANT CHANGE UP (ref 24.5–35.6)
APTT BLD: 59.1 SEC — HIGH (ref 24.5–35.6)
APTT BLD: 97.3 SEC — HIGH (ref 24.5–35.6)
APTT BLD: >200 SEC — CRITICAL HIGH (ref 24.5–35.6)
AST SERPL-CCNC: 1033 U/L — HIGH (ref 4–32)
AST SERPL-CCNC: 68 U/L — HIGH (ref 10–40)
AST SERPL-CCNC: 695 U/L — HIGH (ref 10–40)
AST SERPL-CCNC: 991 U/L — HIGH (ref 4–32)
B PERT DNA SPEC QL NAA+PROBE: SIGNIFICANT CHANGE UP
B PERT IGG+IGM PNL SER: ABNORMAL
B PERT+PARAPERT DNA PNL SPEC NAA+PROBE: SIGNIFICANT CHANGE UP
B-GLOBULIN SERPL ELPH-MCNC: 0.5 G/DL — SIGNIFICANT CHANGE UP (ref 0.5–1)
BASE EXCESS BLDA CALC-SCNC: -1.3 MMOL/L — SIGNIFICANT CHANGE UP (ref -2–3)
BASE EXCESS BLDA CALC-SCNC: -11.4 MMOL/L — LOW (ref -2–3)
BASE EXCESS BLDA CALC-SCNC: -11.6 MMOL/L — LOW (ref -2–3)
BASE EXCESS BLDA CALC-SCNC: -14.4 MMOL/L — LOW (ref -2–3)
BASE EXCESS BLDA CALC-SCNC: -17.2 MMOL/L — LOW (ref -2–3)
BASE EXCESS BLDA CALC-SCNC: -3.4 MMOL/L — LOW (ref -2–3)
BASE EXCESS BLDV CALC-SCNC: -3.4 MMOL/L — LOW (ref -2–3)
BASE EXCESS BLDV CALC-SCNC: -3.5 MMOL/L — LOW (ref -2–3)
BASOPHILS # BLD AUTO: 0 K/UL — SIGNIFICANT CHANGE UP (ref 0–0.2)
BASOPHILS NFR BLD AUTO: 0 % — SIGNIFICANT CHANGE UP (ref 0–2)
BILIRUB DIRECT SERPL-MCNC: 2.2 MG/DL — HIGH (ref 0–0.3)
BILIRUB INDIRECT FLD-MCNC: 0.5 MG/DL — SIGNIFICANT CHANGE UP (ref 0.2–1)
BILIRUB SERPL-MCNC: 2.4 MG/DL — HIGH (ref 0.2–1.2)
BILIRUB SERPL-MCNC: 2.7 MG/DL — HIGH (ref 0.2–1.2)
BILIRUB SERPL-MCNC: 2.7 MG/DL — HIGH (ref 0.2–1.2)
BILIRUB SERPL-MCNC: 3.8 MG/DL — HIGH (ref 0.2–1.2)
BILIRUB SERPL-MCNC: 3.9 MG/DL — HIGH (ref 0.2–1.2)
BILIRUB UR-MCNC: NEGATIVE — SIGNIFICANT CHANGE UP
BLD GP AB SCN SERPL QL: NEGATIVE — SIGNIFICANT CHANGE UP
BLD GP AB SCN SERPL QL: NEGATIVE — SIGNIFICANT CHANGE UP
BLOOD GAS ARTERIAL - LYTES,HGB,ICA,LACT RESULT: SIGNIFICANT CHANGE UP
BLOOD GAS ARTERIAL - LYTES,HGB,ICA,LACT RESULT: SIGNIFICANT CHANGE UP
BLOOD GAS ECMO POST MEMBRANE - ARTERIAL RESULT: SIGNIFICANT CHANGE UP
BLOOD GAS ECMO PRE MEMBRANE - VENOUS RESULT: SIGNIFICANT CHANGE UP
BORDETELLA PARAPERTUSSIS (RAPRVP): SIGNIFICANT CHANGE UP
BUN SERPL-MCNC: 28 MG/DL — HIGH (ref 7–23)
BUN SERPL-MCNC: 28 MG/DL — HIGH (ref 7–23)
BUN SERPL-MCNC: 29 MG/DL — HIGH (ref 7–23)
BUN SERPL-MCNC: 29 MG/DL — HIGH (ref 7–23)
C PNEUM DNA SPEC QL NAA+PROBE: SIGNIFICANT CHANGE UP
CA-I BLDA-SCNC: 1.24 MMOL/L — SIGNIFICANT CHANGE UP (ref 1.15–1.33)
CA-I BLDA-SCNC: 1.26 MMOL/L — SIGNIFICANT CHANGE UP (ref 1.15–1.33)
CA-I BLDA-SCNC: 1.47 MMOL/L — HIGH (ref 1.15–1.33)
CA-I SERPL-SCNC: 1.14 MMOL/L — LOW (ref 1.15–1.33)
CA-I SERPL-SCNC: 1.17 MMOL/L — SIGNIFICANT CHANGE UP (ref 1.15–1.33)
CALCIUM SERPL-MCNC: 8.2 MG/DL — LOW (ref 8.4–10.5)
CALCIUM SERPL-MCNC: 8.2 MG/DL — LOW (ref 8.4–10.5)
CALCIUM SERPL-MCNC: 8.4 MG/DL — SIGNIFICANT CHANGE UP (ref 8.4–10.5)
CALCIUM SERPL-MCNC: 9.1 MG/DL — SIGNIFICANT CHANGE UP (ref 8.4–10.5)
CHLORIDE SERPL-SCNC: 101 MMOL/L — SIGNIFICANT CHANGE UP (ref 96–108)
CHLORIDE SERPL-SCNC: 101 MMOL/L — SIGNIFICANT CHANGE UP (ref 96–108)
CHLORIDE SERPL-SCNC: 96 MMOL/L — LOW (ref 98–107)
CHLORIDE SERPL-SCNC: 96 MMOL/L — LOW (ref 98–107)
CO2 BLDA-SCNC: 14 MMOL/L — LOW (ref 19–24)
CO2 BLDA-SCNC: 17 MMOL/L — LOW (ref 19–24)
CO2 BLDA-SCNC: 17 MMOL/L — LOW (ref 19–24)
CO2 BLDA-SCNC: 20 MMOL/L — SIGNIFICANT CHANGE UP (ref 19–24)
CO2 BLDA-SCNC: 22 MMOL/L — SIGNIFICANT CHANGE UP (ref 19–24)
CO2 BLDA-SCNC: 25 MMOL/L — HIGH (ref 19–24)
CO2 BLDV-SCNC: 24.1 MMOL/L — SIGNIFICANT CHANGE UP (ref 22–26)
CO2 BLDV-SCNC: 25.1 MMOL/L — SIGNIFICANT CHANGE UP (ref 22–26)
CO2 SERPL-SCNC: 18 MMOL/L — LOW (ref 22–31)
CO2 SERPL-SCNC: 21 MMOL/L — LOW (ref 22–31)
COHGB MFR BLDA: 1.5 % — SIGNIFICANT CHANGE UP
COHGB MFR BLDA: 2 % — SIGNIFICANT CHANGE UP
COHGB MFR BLDA: 2.4 % — SIGNIFICANT CHANGE UP
COHGB MFR BLDA: 2.7 % — SIGNIFICANT CHANGE UP
COHGB MFR BLDV: 1.2 % — SIGNIFICANT CHANGE UP
COLOR FLD: SIGNIFICANT CHANGE UP
COLOR SPEC: YELLOW — SIGNIFICANT CHANGE UP
CREAT SERPL-MCNC: 0.95 MG/DL — SIGNIFICANT CHANGE UP (ref 0.5–1.3)
CREAT SERPL-MCNC: 0.95 MG/DL — SIGNIFICANT CHANGE UP (ref 0.5–1.3)
CREAT SERPL-MCNC: 0.97 MG/DL — SIGNIFICANT CHANGE UP (ref 0.5–1.3)
CREAT SERPL-MCNC: 1.02 MG/DL — SIGNIFICANT CHANGE UP (ref 0.5–1.3)
D DIMER BLD IA.RAPID-MCNC: 1058 NG/ML DDU — HIGH
DIFF PNL FLD: ABNORMAL
EGFR: 61 ML/MIN/1.73M2 — SIGNIFICANT CHANGE UP
EGFR: 65 ML/MIN/1.73M2 — SIGNIFICANT CHANGE UP
EGFR: 67 ML/MIN/1.73M2 — SIGNIFICANT CHANGE UP
EGFR: 67 ML/MIN/1.73M2 — SIGNIFICANT CHANGE UP
EOSINOPHIL # BLD AUTO: 0 K/UL — SIGNIFICANT CHANGE UP (ref 0–0.5)
EOSINOPHIL # FLD: 0 % — SIGNIFICANT CHANGE UP
EOSINOPHIL NFR BLD AUTO: 0 % — SIGNIFICANT CHANGE UP (ref 0–6)
FIBRINOGEN PPP-MCNC: 234 MG/DL — SIGNIFICANT CHANGE UP (ref 200–465)
FLUAV SUBTYP SPEC NAA+PROBE: SIGNIFICANT CHANGE UP
FLUBV RNA SPEC QL NAA+PROBE: SIGNIFICANT CHANGE UP
FLUID INTAKE SUBSTANCE CLASS: SIGNIFICANT CHANGE UP
FOLATE+VIT B12 SERBLD-IMP: 0 % — SIGNIFICANT CHANGE UP
GAMMA GLOBULIN: 1.1 G/DL — SIGNIFICANT CHANGE UP (ref 0.6–1.6)
GAS PNL BLDA: SIGNIFICANT CHANGE UP
GAS PNL BLDA: SIGNIFICANT CHANGE UP
GAS PNL BLDV: 134 MMOL/L — LOW (ref 136–145)
GAS PNL BLDV: 135 MMOL/L — LOW (ref 136–145)
GAS PNL BLDV: SIGNIFICANT CHANGE UP
GAS PNL BLDV: SIGNIFICANT CHANGE UP
GLUCOSE BLDA-MCNC: 151 MG/DL — HIGH (ref 70–99)
GLUCOSE BLDA-MCNC: 151 MG/DL — HIGH (ref 70–99)
GLUCOSE BLDA-MCNC: 162 MG/DL — HIGH (ref 70–99)
GLUCOSE BLDC GLUCOMTR-MCNC: 296 MG/DL — HIGH (ref 70–99)
GLUCOSE SERPL-MCNC: 184 MG/DL — HIGH (ref 70–99)
GLUCOSE SERPL-MCNC: 271 MG/DL — HIGH (ref 70–99)
GLUCOSE SERPL-MCNC: 279 MG/DL — HIGH (ref 70–99)
GLUCOSE SERPL-MCNC: 333 MG/DL — HIGH (ref 70–99)
GLUCOSE UR QL: NEGATIVE MG/DL — SIGNIFICANT CHANGE UP
HADV DNA SPEC QL NAA+PROBE: SIGNIFICANT CHANGE UP
HAPTOGLOB SERPL-MCNC: 56 MG/DL — SIGNIFICANT CHANGE UP (ref 34–200)
HCO3 BLDA-SCNC: 13 MMOL/L — LOW (ref 21–28)
HCO3 BLDA-SCNC: 16 MMOL/L — LOW (ref 21–28)
HCO3 BLDA-SCNC: 16 MMOL/L — LOW (ref 21–28)
HCO3 BLDA-SCNC: 19 MMOL/L — LOW (ref 21–28)
HCO3 BLDA-SCNC: 21 MMOL/L — SIGNIFICANT CHANGE UP (ref 21–28)
HCO3 BLDA-SCNC: 24 MMOL/L — SIGNIFICANT CHANGE UP (ref 21–28)
HCO3 BLDV-SCNC: 23 MMOL/L — SIGNIFICANT CHANGE UP (ref 22–29)
HCO3 BLDV-SCNC: 24 MMOL/L — SIGNIFICANT CHANGE UP (ref 22–29)
HCOV 229E RNA SPEC QL NAA+PROBE: SIGNIFICANT CHANGE UP
HCOV HKU1 RNA SPEC QL NAA+PROBE: SIGNIFICANT CHANGE UP
HCOV NL63 RNA SPEC QL NAA+PROBE: SIGNIFICANT CHANGE UP
HCOV OC43 RNA SPEC QL NAA+PROBE: SIGNIFICANT CHANGE UP
HCT VFR BLD CALC: 34.7 % — SIGNIFICANT CHANGE UP (ref 34.5–45)
HCT VFR BLD CALC: 39.6 % — SIGNIFICANT CHANGE UP (ref 34.5–45)
HCT VFR BLD CALC: 49.1 % — HIGH (ref 34.5–45)
HCT VFR BLDA CALC: 47 % — SIGNIFICANT CHANGE UP
HGB BLD CALC-MCNC: 15.5 G/DL — SIGNIFICANT CHANGE UP (ref 11.7–16.1)
HGB BLD-MCNC: 11.5 G/DL — SIGNIFICANT CHANGE UP (ref 11.5–15.5)
HGB BLD-MCNC: 13.2 G/DL — SIGNIFICANT CHANGE UP (ref 11.5–15.5)
HGB BLD-MCNC: 15.7 G/DL — HIGH (ref 11.5–15.5)
HGB BLDA-MCNC: 11.7 G/DL — SIGNIFICANT CHANGE UP (ref 11.7–16.1)
HGB BLDA-MCNC: 12.2 G/DL — SIGNIFICANT CHANGE UP (ref 11.7–16.1)
HGB BLDA-MCNC: 12.5 G/DL — SIGNIFICANT CHANGE UP (ref 11.7–16.1)
HGB BLDA-MCNC: 15.4 G/DL — SIGNIFICANT CHANGE UP (ref 11.7–16.1)
HMPV RNA SPEC QL NAA+PROBE: SIGNIFICANT CHANGE UP
HPIV1 RNA SPEC QL NAA+PROBE: SIGNIFICANT CHANGE UP
HPIV2 RNA SPEC QL NAA+PROBE: SIGNIFICANT CHANGE UP
HPIV3 RNA SPEC QL NAA+PROBE: SIGNIFICANT CHANGE UP
HPIV4 RNA SPEC QL NAA+PROBE: SIGNIFICANT CHANGE UP
IANC: 6.63 K/UL — SIGNIFICANT CHANGE UP (ref 1.8–7.4)
INR BLD: 1.21 — HIGH (ref 0.85–1.18)
INR BLD: 2.17 — HIGH (ref 0.85–1.18)
INR BLD: 2.47 RATIO — HIGH (ref 0.85–1.18)
INTERPRETATION SERPL IFE-IMP: SIGNIFICANT CHANGE UP
ISTAT ARTERIAL BE: -5 MMOL/L — LOW (ref -2–3)
ISTAT ARTERIAL GLUCOSE: 173 MG/DL — HIGH (ref 70–99)
ISTAT ARTERIAL HCO3: 21 MMOL/L — LOW (ref 22–26)
ISTAT ARTERIAL HEMATOCRIT: 37 % — SIGNIFICANT CHANGE UP (ref 34.5–45)
ISTAT ARTERIAL HEMOGLOBIN: 12.6 G/DL — SIGNIFICANT CHANGE UP (ref 11.5–15.5)
ISTAT ARTERIAL IONIZED CALCIUM: 1.25 MMOL/L — SIGNIFICANT CHANGE UP (ref 1.12–1.3)
ISTAT ARTERIAL PCO2: 40 MMHG — SIGNIFICANT CHANGE UP (ref 35–45)
ISTAT ARTERIAL PH: 7.33 — LOW (ref 7.35–7.45)
ISTAT ARTERIAL PO2: 138 MMHG — HIGH (ref 80–105)
ISTAT ARTERIAL POTASSIUM: 3.4 MMOL/L — LOW (ref 3.5–5.3)
ISTAT ARTERIAL SO2: 99 % — HIGH (ref 95–98)
ISTAT ARTERIAL SODIUM: 139 MMOL/L — SIGNIFICANT CHANGE UP (ref 135–145)
ISTAT ARTERIAL TCO2: 22 MMOL/L — SIGNIFICANT CHANGE UP (ref 22–31)
KETONES UR-MCNC: NEGATIVE MG/DL — SIGNIFICANT CHANGE UP
LACTATE SERPL-SCNC: 3.6 MMOL/L — HIGH (ref 0.5–2)
LACTATE SERPL-SCNC: 8.2 MMOL/L — CRITICAL HIGH (ref 0.5–2)
LDH SERPL L TO P-CCNC: 1307 U/L — HIGH (ref 135–225)
LEUKOCYTE ESTERASE UR-ACNC: NEGATIVE — SIGNIFICANT CHANGE UP
LIDOCAIN IGE QN: 27 U/L — SIGNIFICANT CHANGE UP (ref 7–60)
LYMPHOCYTES # BLD AUTO: 0.2 K/UL — LOW (ref 1–3.3)
LYMPHOCYTES # BLD AUTO: 2.7 % — LOW (ref 13–44)
LYMPHOCYTES # FLD: 5 % — SIGNIFICANT CHANGE UP
M PNEUMO DNA SPEC QL NAA+PROBE: SIGNIFICANT CHANGE UP
MAGNESIUM SERPL-MCNC: 1.8 MG/DL — SIGNIFICANT CHANGE UP (ref 1.6–2.6)
MAGNESIUM SERPL-MCNC: 2.2 MG/DL — SIGNIFICANT CHANGE UP (ref 1.6–2.6)
MCHC RBC-ENTMCNC: 30.3 PG — SIGNIFICANT CHANGE UP (ref 27–34)
MCHC RBC-ENTMCNC: 30.3 PG — SIGNIFICANT CHANGE UP (ref 27–34)
MCHC RBC-ENTMCNC: 31.1 PG — SIGNIFICANT CHANGE UP (ref 27–34)
MCHC RBC-ENTMCNC: 32 GM/DL — SIGNIFICANT CHANGE UP (ref 32–36)
MCHC RBC-ENTMCNC: 33.1 GM/DL — SIGNIFICANT CHANGE UP (ref 32–36)
MCHC RBC-ENTMCNC: 33.3 GM/DL — SIGNIFICANT CHANGE UP (ref 32–36)
MCV RBC AUTO: 91.3 FL — SIGNIFICANT CHANGE UP (ref 80–100)
MCV RBC AUTO: 93.4 FL — SIGNIFICANT CHANGE UP (ref 80–100)
MCV RBC AUTO: 94.8 FL — SIGNIFICANT CHANGE UP (ref 80–100)
MESOTHL CELL # FLD: 0 % — SIGNIFICANT CHANGE UP
METHGB MFR BLDA: 0.7 % — SIGNIFICANT CHANGE UP
METHGB MFR BLDA: 0.9 % — SIGNIFICANT CHANGE UP
METHGB MFR BLDA: 1 % — SIGNIFICANT CHANGE UP
METHGB MFR BLDA: 1 % — SIGNIFICANT CHANGE UP
METHGB MFR BLDV: 0.8 % — SIGNIFICANT CHANGE UP
MONOCYTES # BLD AUTO: 0.13 K/UL — SIGNIFICANT CHANGE UP (ref 0–0.9)
MONOCYTES NFR BLD AUTO: 1.8 % — LOW (ref 2–14)
MONOS+MACROS # FLD: 12 % — SIGNIFICANT CHANGE UP
NEUTROPHILS # BLD AUTO: 7.09 K/UL — SIGNIFICANT CHANGE UP (ref 1.8–7.4)
NEUTROPHILS NFR BLD AUTO: 95.5 % — HIGH (ref 43–77)
NEUTROPHILS-BODY FLUID: 83 % — SIGNIFICANT CHANGE UP
NITRITE UR-MCNC: NEGATIVE — SIGNIFICANT CHANGE UP
NRBC # BLD: 0 /100 WBCS — SIGNIFICANT CHANGE UP (ref 0–0)
NRBC # BLD: 0 /100 WBCS — SIGNIFICANT CHANGE UP (ref 0–0)
NT-PROBNP SERPL-SCNC: HIGH PG/ML
OTHER CELLS FLD MANUAL: 0 % — SIGNIFICANT CHANGE UP
OXYHGB MFR BLDA: 21.7 % — LOW (ref 90–95)
OXYHGB MFR BLDA: 95.3 % — HIGH (ref 90–95)
OXYHGB MFR BLDA: 96.3 % — HIGH (ref 90–95)
OXYHGB MFR BLDA: 96.5 % — HIGH (ref 90–95)
PCO2 BLDA: 37 MMHG — SIGNIFICANT CHANGE UP (ref 32–45)
PCO2 BLDA: 38 MMHG — SIGNIFICANT CHANGE UP (ref 32–45)
PCO2 BLDA: 40 MMHG — SIGNIFICANT CHANGE UP (ref 32–45)
PCO2 BLDA: 47 MMHG — HIGH (ref 32–45)
PCO2 BLDA: 53 MMHG — HIGH (ref 32–45)
PCO2 BLDA: 60 MMHG — HIGH (ref 32–45)
PCO2 BLDV: 44 MMHG — HIGH (ref 39–42)
PCO2 BLDV: 49 MMHG — HIGH (ref 39–42)
PH BLDA: 7.05 — CRITICAL LOW (ref 7.35–7.45)
PH BLDA: 7.08 — CRITICAL LOW (ref 7.35–7.45)
PH BLDA: 7.1 — CRITICAL LOW (ref 7.35–7.45)
PH BLDA: 7.22 — LOW (ref 7.35–7.45)
PH BLDA: 7.37 — SIGNIFICANT CHANGE UP (ref 7.35–7.45)
PH BLDA: 7.38 — SIGNIFICANT CHANGE UP (ref 7.35–7.45)
PH BLDV: 7.29 — LOW (ref 7.32–7.43)
PH BLDV: 7.32 — SIGNIFICANT CHANGE UP (ref 7.32–7.43)
PH UR: 6 — SIGNIFICANT CHANGE UP (ref 5–8)
PHOSPHATE SERPL-MCNC: 3.5 MG/DL — SIGNIFICANT CHANGE UP (ref 2.5–4.5)
PHOSPHATE SERPL-MCNC: 4.5 MG/DL — SIGNIFICANT CHANGE UP (ref 2.5–4.5)
PLATELET # BLD AUTO: 141 K/UL — LOW (ref 150–400)
PLATELET # BLD AUTO: 81 K/UL — LOW (ref 150–400)
PLATELET # BLD AUTO: 93 K/UL — LOW (ref 150–400)
PO2 BLDA: 107 MMHG — SIGNIFICANT CHANGE UP (ref 83–108)
PO2 BLDA: 153 MMHG — HIGH (ref 83–108)
PO2 BLDA: 46 MMHG — CRITICAL LOW (ref 83–108)
PO2 BLDA: 57 MMHG — LOW (ref 83–108)
PO2 BLDA: 74 MMHG — LOW (ref 83–108)
PO2 BLDA: 86 MMHG — SIGNIFICANT CHANGE UP (ref 83–108)
PO2 BLDV: 39 MMHG — SIGNIFICANT CHANGE UP (ref 25–45)
PO2 BLDV: 51 MMHG — HIGH (ref 25–45)
POTASSIUM BLDA-SCNC: 3.2 MMOL/L — LOW (ref 3.5–5.1)
POTASSIUM BLDA-SCNC: 3.6 MMOL/L — SIGNIFICANT CHANGE UP (ref 3.5–5.1)
POTASSIUM BLDA-SCNC: 3.7 MMOL/L — SIGNIFICANT CHANGE UP (ref 3.5–5.1)
POTASSIUM BLDV-SCNC: 3.3 MMOL/L — LOW (ref 3.5–5.1)
POTASSIUM BLDV-SCNC: 3.8 MMOL/L — SIGNIFICANT CHANGE UP (ref 3.5–5.1)
POTASSIUM SERPL-MCNC: 3.7 MMOL/L — SIGNIFICANT CHANGE UP (ref 3.5–5.3)
POTASSIUM SERPL-MCNC: 4.2 MMOL/L — SIGNIFICANT CHANGE UP (ref 3.5–5.3)
POTASSIUM SERPL-MCNC: 4.4 MMOL/L — SIGNIFICANT CHANGE UP (ref 3.5–5.3)
POTASSIUM SERPL-MCNC: 5.2 MMOL/L — SIGNIFICANT CHANGE UP (ref 3.5–5.3)
POTASSIUM SERPL-SCNC: 3.7 MMOL/L — SIGNIFICANT CHANGE UP (ref 3.5–5.3)
POTASSIUM SERPL-SCNC: 4.2 MMOL/L — SIGNIFICANT CHANGE UP (ref 3.5–5.3)
POTASSIUM SERPL-SCNC: 4.4 MMOL/L — SIGNIFICANT CHANGE UP (ref 3.5–5.3)
POTASSIUM SERPL-SCNC: 5.2 MMOL/L — SIGNIFICANT CHANGE UP (ref 3.5–5.3)
PROT ?TM UR-MCNC: 64 MG/DL — HIGH (ref 0–12)
PROT PATTERN SERPL ELPH-IMP: SIGNIFICANT CHANGE UP
PROT SERPL-MCNC: 4.5 G/DL — LOW (ref 6–8.3)
PROT SERPL-MCNC: 5.1 G/DL — LOW (ref 6–8.3)
PROT SERPL-MCNC: 5.4 G/DL — LOW (ref 6–8.3)
PROT SERPL-MCNC: 5.6 G/DL — LOW (ref 6–8.3)
PROT UR-MCNC: NEGATIVE MG/DL — SIGNIFICANT CHANGE UP
PROTHROM AB SERPL-ACNC: 13.7 SEC — HIGH (ref 9.5–13)
PROTHROM AB SERPL-ACNC: 24.2 SEC — HIGH (ref 9.5–13)
PROTHROM AB SERPL-ACNC: 27.2 SEC — HIGH (ref 9.5–13)
RAPID RVP RESULT: SIGNIFICANT CHANGE UP
RBC # BLD: 3.8 M/UL — SIGNIFICANT CHANGE UP (ref 3.8–5.2)
RBC # BLD: 4.24 M/UL — SIGNIFICANT CHANGE UP (ref 3.8–5.2)
RBC # BLD: 5.18 M/UL — SIGNIFICANT CHANGE UP (ref 3.8–5.2)
RBC # FLD: 15.2 % — HIGH (ref 10.3–14.5)
RBC # FLD: 15.3 % — HIGH (ref 10.3–14.5)
RBC # FLD: 15.6 % — HIGH (ref 10.3–14.5)
RCV VOL RI: SIGNIFICANT CHANGE UP CELLS/UL (ref 0–5)
RH IG SCN BLD-IMP: POSITIVE — SIGNIFICANT CHANGE UP
RSV RNA SPEC QL NAA+PROBE: SIGNIFICANT CHANGE UP
RV+EV RNA SPEC QL NAA+PROBE: SIGNIFICANT CHANGE UP
SAO2 % BLDA: 22.2 % — LOW (ref 94–98)
SAO2 % BLDA: 62.4 % — LOW (ref 94–98)
SAO2 % BLDA: 79.4 % — LOW (ref 94–98)
SAO2 % BLDA: 91.2 % — LOW (ref 94–98)
SAO2 % BLDA: 98.6 % — HIGH (ref 94–98)
SAO2 % BLDA: 99.5 % — HIGH (ref 94–98)
SAO2 % BLDA: 99.9 % — HIGH (ref 94–98)
SAO2 % BLDV: 22 % — LOW (ref 67–88)
SAO2 % BLDV: 60.4 % — LOW (ref 67–88)
SAO2 % BLDV: 82.9 % — SIGNIFICANT CHANGE UP (ref 67–88)
SARS-COV-2 RNA SPEC QL NAA+PROBE: SIGNIFICANT CHANGE UP
SODIUM BLDA-SCNC: 133 MMOL/L — LOW (ref 136–145)
SODIUM BLDA-SCNC: 137 MMOL/L — SIGNIFICANT CHANGE UP (ref 136–145)
SODIUM BLDA-SCNC: 139 MMOL/L — SIGNIFICANT CHANGE UP (ref 136–145)
SODIUM SERPL-SCNC: 130 MMOL/L — LOW (ref 135–145)
SODIUM SERPL-SCNC: 134 MMOL/L — LOW (ref 135–145)
SODIUM SERPL-SCNC: 136 MMOL/L — SIGNIFICANT CHANGE UP (ref 135–145)
SODIUM SERPL-SCNC: 139 MMOL/L — SIGNIFICANT CHANGE UP (ref 135–145)
SP GR SPEC: 1.01 — SIGNIFICANT CHANGE UP (ref 1–1.03)
TOTAL CELLS COUNTED, BODY FLUID: 100 CELLS — SIGNIFICANT CHANGE UP
TOTAL NUCLEATED CELL COUNT, BODY FLUID: SIGNIFICANT CHANGE UP CELLS/UL (ref 0–5)
UROBILINOGEN FLD QL: 0.2 MG/DL — SIGNIFICANT CHANGE UP (ref 0.2–1)
VANCOMYCIN FLD-MCNC: 5.5 UG/ML — SIGNIFICANT CHANGE UP
WBC # BLD: 11.48 K/UL — HIGH (ref 3.8–10.5)
WBC # BLD: 16.11 K/UL — HIGH (ref 3.8–10.5)
WBC # BLD: 7.42 K/UL — SIGNIFICANT CHANGE UP (ref 3.8–10.5)
WBC # FLD AUTO: 11.48 K/UL — HIGH (ref 3.8–10.5)
WBC # FLD AUTO: 16.11 K/UL — HIGH (ref 3.8–10.5)
WBC # FLD AUTO: 7.42 K/UL — SIGNIFICANT CHANGE UP (ref 3.8–10.5)

## 2023-09-13 PROCEDURE — 85379 FIBRIN DEGRADATION QUANT: CPT

## 2023-09-13 PROCEDURE — 86036 ANCA SCREEN EACH ANTIBODY: CPT

## 2023-09-13 PROCEDURE — 51702 INSERT TEMP BLADDER CATH: CPT

## 2023-09-13 PROCEDURE — 36620 INSERTION CATHETER ARTERY: CPT

## 2023-09-13 PROCEDURE — 82553 CREATINE MB FRACTION: CPT

## 2023-09-13 PROCEDURE — 33952 ECMO/ECLS INSJ PRPH CANNULA: CPT

## 2023-09-13 PROCEDURE — 71250 CT THORAX DX C-: CPT

## 2023-09-13 PROCEDURE — 88305 TISSUE EXAM BY PATHOLOGIST: CPT

## 2023-09-13 PROCEDURE — 86335 IMMUNFIX E-PHORSIS/URINE/CSF: CPT

## 2023-09-13 PROCEDURE — 84156 ASSAY OF PROTEIN URINE: CPT

## 2023-09-13 PROCEDURE — 85610 PROTHROMBIN TIME: CPT

## 2023-09-13 PROCEDURE — 88112 CYTOPATH CELL ENHANCE TECH: CPT | Mod: 26

## 2023-09-13 PROCEDURE — 87102 FUNGUS ISOLATION CULTURE: CPT

## 2023-09-13 PROCEDURE — 93308 TTE F-UP OR LMTD: CPT | Mod: 26,GC

## 2023-09-13 PROCEDURE — 86334 IMMUNOFIX E-PHORESIS SERUM: CPT

## 2023-09-13 PROCEDURE — 86225 DNA ANTIBODY NATIVE: CPT

## 2023-09-13 PROCEDURE — 86709 HEPATITIS A IGM ANTIBODY: CPT

## 2023-09-13 PROCEDURE — 31624 DX BRONCHOSCOPE/LAVAGE: CPT | Mod: GC

## 2023-09-13 PROCEDURE — 86706 HEP B SURFACE ANTIBODY: CPT

## 2023-09-13 PROCEDURE — 84484 ASSAY OF TROPONIN QUANT: CPT

## 2023-09-13 PROCEDURE — 86001 ALLERGEN SPECIFIC IGG: CPT

## 2023-09-13 PROCEDURE — 82330 ASSAY OF CALCIUM: CPT

## 2023-09-13 PROCEDURE — 82150 ASSAY OF AMYLASE: CPT

## 2023-09-13 PROCEDURE — 88112 CYTOPATH CELL ENHANCE TECH: CPT

## 2023-09-13 PROCEDURE — 86140 C-REACTIVE PROTEIN: CPT

## 2023-09-13 PROCEDURE — 0225U NFCT DS DNA&RNA 21 SARSCOV2: CPT

## 2023-09-13 PROCEDURE — 87070 CULTURE OTHR SPECIMN AEROBIC: CPT

## 2023-09-13 PROCEDURE — 86160 COMPLEMENT ANTIGEN: CPT

## 2023-09-13 PROCEDURE — 93314 ECHO TRANSESOPHAGEAL: CPT | Mod: 26,GC

## 2023-09-13 PROCEDURE — 84300 ASSAY OF URINE SODIUM: CPT

## 2023-09-13 PROCEDURE — 86769 SARS-COV-2 COVID-19 ANTIBODY: CPT

## 2023-09-13 PROCEDURE — 71045 X-RAY EXAM CHEST 1 VIEW: CPT

## 2023-09-13 PROCEDURE — 86705 HEP B CORE ANTIBODY IGM: CPT

## 2023-09-13 PROCEDURE — 86900 BLOOD TYPING SEROLOGIC ABO: CPT

## 2023-09-13 PROCEDURE — 83930 ASSAY OF BLOOD OSMOLALITY: CPT

## 2023-09-13 PROCEDURE — 82947 ASSAY GLUCOSE BLOOD QUANT: CPT

## 2023-09-13 PROCEDURE — 83520 IMMUNOASSAY QUANT NOS NONAB: CPT

## 2023-09-13 PROCEDURE — 86200 CCP ANTIBODY: CPT

## 2023-09-13 PROCEDURE — 88184 FLOWCYTOMETRY/ TC 1 MARKER: CPT

## 2023-09-13 PROCEDURE — 33948 ECMO/ECLS DAILY MGMT-VENOUS: CPT

## 2023-09-13 PROCEDURE — 84182 PROTEIN WESTERN BLOT TEST: CPT

## 2023-09-13 PROCEDURE — 86431 RHEUMATOID FACTOR QUANT: CPT

## 2023-09-13 PROCEDURE — C1889: CPT

## 2023-09-13 PROCEDURE — 33946 ECMO/ECLS INITIATION VENOUS: CPT | Mod: GC

## 2023-09-13 PROCEDURE — 84132 ASSAY OF SERUM POTASSIUM: CPT

## 2023-09-13 PROCEDURE — 93005 ELECTROCARDIOGRAM TRACING: CPT

## 2023-09-13 PROCEDURE — 80053 COMPREHEN METABOLIC PANEL: CPT

## 2023-09-13 PROCEDURE — 82247 BILIRUBIN TOTAL: CPT

## 2023-09-13 PROCEDURE — 33946 ECMO/ECLS INITIATION VENOUS: CPT

## 2023-09-13 PROCEDURE — 82248 BILIRUBIN DIRECT: CPT

## 2023-09-13 PROCEDURE — 83516 IMMUNOASSAY NONANTIBODY: CPT

## 2023-09-13 PROCEDURE — 87040 BLOOD CULTURE FOR BACTERIA: CPT

## 2023-09-13 PROCEDURE — 85014 HEMATOCRIT: CPT

## 2023-09-13 PROCEDURE — 87205 SMEAR GRAM STAIN: CPT

## 2023-09-13 PROCEDURE — 84540 ASSAY OF URINE/UREA-N: CPT

## 2023-09-13 PROCEDURE — 97161 PT EVAL LOW COMPLEX 20 MIN: CPT

## 2023-09-13 PROCEDURE — 86038 ANTINUCLEAR ANTIBODIES: CPT

## 2023-09-13 PROCEDURE — C8929: CPT

## 2023-09-13 PROCEDURE — 36415 COLL VENOUS BLD VENIPUNCTURE: CPT

## 2023-09-13 PROCEDURE — 88305 TISSUE EXAM BY PATHOLOGIST: CPT | Mod: 26

## 2023-09-13 PROCEDURE — 85730 THROMBOPLASTIN TIME PARTIAL: CPT

## 2023-09-13 PROCEDURE — 84155 ASSAY OF PROTEIN SERUM: CPT

## 2023-09-13 PROCEDURE — C9399: CPT

## 2023-09-13 PROCEDURE — 80048 BASIC METABOLIC PNL TOTAL CA: CPT

## 2023-09-13 PROCEDURE — 82803 BLOOD GASES ANY COMBINATION: CPT

## 2023-09-13 PROCEDURE — 99292 CRITICAL CARE ADDL 30 MIN: CPT | Mod: GC,25

## 2023-09-13 PROCEDURE — 82570 ASSAY OF URINE CREATININE: CPT

## 2023-09-13 PROCEDURE — 84207 ASSAY OF VITAMIN B-6: CPT

## 2023-09-13 PROCEDURE — 85347 COAGULATION TIME ACTIVATED: CPT

## 2023-09-13 PROCEDURE — 82550 ASSAY OF CK (CPK): CPT

## 2023-09-13 PROCEDURE — 84165 PROTEIN E-PHORESIS SERUM: CPT

## 2023-09-13 PROCEDURE — 85652 RBC SED RATE AUTOMATED: CPT

## 2023-09-13 PROCEDURE — 83735 ASSAY OF MAGNESIUM: CPT

## 2023-09-13 PROCEDURE — 71275 CT ANGIOGRAPHY CHEST: CPT | Mod: MC

## 2023-09-13 PROCEDURE — 81003 URINALYSIS AUTO W/O SCOPE: CPT

## 2023-09-13 PROCEDURE — 80061 LIPID PANEL: CPT

## 2023-09-13 PROCEDURE — 84100 ASSAY OF PHOSPHORUS: CPT

## 2023-09-13 PROCEDURE — 83605 ASSAY OF LACTIC ACID: CPT

## 2023-09-13 PROCEDURE — 99291 CRITICAL CARE FIRST HOUR: CPT

## 2023-09-13 PROCEDURE — 76604 US EXAM CHEST: CPT | Mod: 26,GC

## 2023-09-13 PROCEDURE — 86803 HEPATITIS C AB TEST: CPT

## 2023-09-13 PROCEDURE — 84145 PROCALCITONIN (PCT): CPT

## 2023-09-13 PROCEDURE — 81001 URINALYSIS AUTO W/SCOPE: CPT

## 2023-09-13 PROCEDURE — 84133 ASSAY OF URINE POTASSIUM: CPT

## 2023-09-13 PROCEDURE — 83880 ASSAY OF NATRIURETIC PEPTIDE: CPT

## 2023-09-13 PROCEDURE — 82085 ASSAY OF ALDOLASE: CPT

## 2023-09-13 PROCEDURE — 71045 X-RAY EXAM CHEST 1 VIEW: CPT | Mod: 26,76

## 2023-09-13 PROCEDURE — C1751: CPT

## 2023-09-13 PROCEDURE — P9047: CPT

## 2023-09-13 PROCEDURE — C1894: CPT

## 2023-09-13 PROCEDURE — 83690 ASSAY OF LIPASE: CPT

## 2023-09-13 PROCEDURE — C1887: CPT

## 2023-09-13 PROCEDURE — 89051 BODY FLUID CELL COUNT: CPT

## 2023-09-13 PROCEDURE — 86235 NUCLEAR ANTIGEN ANTIBODY: CPT

## 2023-09-13 PROCEDURE — 93970 EXTREMITY STUDY: CPT

## 2023-09-13 PROCEDURE — P9045: CPT

## 2023-09-13 PROCEDURE — 85025 COMPLETE CBC W/AUTO DIFF WBC: CPT

## 2023-09-13 PROCEDURE — 87641 MR-STAPH DNA AMP PROBE: CPT

## 2023-09-13 PROCEDURE — 36010 PLACE CATHETER IN VEIN: CPT

## 2023-09-13 PROCEDURE — 88185 FLOWCYTOMETRY/TC ADD-ON: CPT

## 2023-09-13 PROCEDURE — 85027 COMPLETE CBC AUTOMATED: CPT

## 2023-09-13 PROCEDURE — 76937 US GUIDE VASCULAR ACCESS: CPT | Mod: 26

## 2023-09-13 PROCEDURE — 87449 NOS EACH ORGANISM AG IA: CPT

## 2023-09-13 PROCEDURE — 86704 HEP B CORE ANTIBODY TOTAL: CPT

## 2023-09-13 PROCEDURE — 87305 ASPERGILLUS AG IA: CPT

## 2023-09-13 PROCEDURE — 84295 ASSAY OF SERUM SODIUM: CPT

## 2023-09-13 PROCEDURE — 84166 PROTEIN E-PHORESIS/URINE/CSF: CPT

## 2023-09-13 PROCEDURE — 83036 HEMOGLOBIN GLYCOSYLATED A1C: CPT

## 2023-09-13 PROCEDURE — 86901 BLOOD TYPING SEROLOGIC RH(D): CPT

## 2023-09-13 PROCEDURE — 36600 WITHDRAWAL OF ARTERIAL BLOOD: CPT | Mod: 59

## 2023-09-13 PROCEDURE — 94660 CPAP INITIATION&MGMT: CPT

## 2023-09-13 PROCEDURE — 31645 BRNCHSC W/THER ASPIR 1ST: CPT | Mod: GC

## 2023-09-13 PROCEDURE — C1769: CPT

## 2023-09-13 PROCEDURE — 87640 STAPH A DNA AMP PROBE: CPT

## 2023-09-13 PROCEDURE — 83935 ASSAY OF URINE OSMOLALITY: CPT

## 2023-09-13 PROCEDURE — 86850 RBC ANTIBODY SCREEN: CPT

## 2023-09-13 PROCEDURE — 36000 PLACE NEEDLE IN VEIN: CPT | Mod: 59

## 2023-09-13 PROCEDURE — 99291 CRITICAL CARE FIRST HOUR: CPT | Mod: GC,25

## 2023-09-13 PROCEDURE — 93321 DOPPLER ECHO F-UP/LMTD STD: CPT

## 2023-09-13 PROCEDURE — 87899 AGENT NOS ASSAY W/OPTIC: CPT

## 2023-09-13 PROCEDURE — 82962 GLUCOSE BLOOD TEST: CPT

## 2023-09-13 PROCEDURE — 84443 ASSAY THYROID STIM HORMONE: CPT

## 2023-09-13 PROCEDURE — 86255 FLUORESCENT ANTIBODY SCREEN: CPT

## 2023-09-13 PROCEDURE — 87340 HEPATITIS B SURFACE AG IA: CPT

## 2023-09-13 PROCEDURE — 71045 X-RAY EXAM CHEST 1 VIEW: CPT | Mod: 26,77

## 2023-09-13 RX ORDER — CHLORHEXIDINE GLUCONATE 213 G/1000ML
1 SOLUTION TOPICAL
Refills: 0 | Status: DISCONTINUED | OUTPATIENT
Start: 2023-09-13 | End: 2023-09-13

## 2023-09-13 RX ORDER — INSULIN LISPRO 100/ML
VIAL (ML) SUBCUTANEOUS EVERY 6 HOURS
Refills: 0 | Status: DISCONTINUED | OUTPATIENT
Start: 2023-09-13 | End: 2023-09-14

## 2023-09-13 RX ORDER — FENTANYL CITRATE 50 UG/ML
0.5 INJECTION INTRAVENOUS
Qty: 2500 | Refills: 0 | Status: DISCONTINUED | OUTPATIENT
Start: 2023-09-13 | End: 2023-09-13

## 2023-09-13 RX ORDER — NOREPINEPHRINE BITARTRATE/D5W 8 MG/250ML
0.05 PLASTIC BAG, INJECTION (ML) INTRAVENOUS
Qty: 32 | Refills: 0 | Status: DISCONTINUED | OUTPATIENT
Start: 2023-09-13 | End: 2023-09-13

## 2023-09-13 RX ORDER — ALBUMIN HUMAN 25 %
250 VIAL (ML) INTRAVENOUS
Refills: 0 | Status: COMPLETED | OUTPATIENT
Start: 2023-09-13 | End: 2023-09-13

## 2023-09-13 RX ORDER — FENTANYL CITRATE 50 UG/ML
100 INJECTION INTRAVENOUS ONCE
Refills: 0 | Status: DISCONTINUED | OUTPATIENT
Start: 2023-09-13 | End: 2023-09-14

## 2023-09-13 RX ORDER — PIPERACILLIN AND TAZOBACTAM 4; .5 G/20ML; G/20ML
4.5 INJECTION, POWDER, LYOPHILIZED, FOR SOLUTION INTRAVENOUS EVERY 8 HOURS
Refills: 0 | Status: DISCONTINUED | OUTPATIENT
Start: 2023-09-13 | End: 2023-09-13

## 2023-09-13 RX ORDER — SENNA PLUS 8.6 MG/1
10 TABLET ORAL
Refills: 0 | Status: DISCONTINUED | OUTPATIENT
Start: 2023-09-13 | End: 2023-09-24

## 2023-09-13 RX ORDER — DIGOXIN 250 MCG
500 TABLET ORAL ONCE
Refills: 0 | Status: COMPLETED | OUTPATIENT
Start: 2023-09-13 | End: 2023-09-13

## 2023-09-13 RX ORDER — DEXTROSE 50 % IN WATER 50 %
12.5 SYRINGE (ML) INTRAVENOUS ONCE
Refills: 0 | Status: DISCONTINUED | OUTPATIENT
Start: 2023-09-13 | End: 2023-09-19

## 2023-09-13 RX ORDER — POLYETHYLENE GLYCOL 3350 17 G/17G
17 POWDER, FOR SOLUTION ORAL
Refills: 0 | Status: DISCONTINUED | OUTPATIENT
Start: 2023-09-13 | End: 2023-09-15

## 2023-09-13 RX ORDER — CISATRACURIUM BESYLATE 2 MG/ML
3 INJECTION INTRAVENOUS
Qty: 200 | Refills: 0 | Status: DISCONTINUED | OUTPATIENT
Start: 2023-09-13 | End: 2023-09-13

## 2023-09-13 RX ORDER — ARGATROBAN 50 MG/50ML
0.4 INJECTION, SOLUTION INTRAVENOUS
Qty: 250 | Refills: 0 | Status: DISCONTINUED | OUTPATIENT
Start: 2023-09-13 | End: 2023-09-25

## 2023-09-13 RX ORDER — FUROSEMIDE 40 MG
20 TABLET ORAL ONCE
Refills: 0 | Status: DISCONTINUED | OUTPATIENT
Start: 2023-09-13 | End: 2023-09-13

## 2023-09-13 RX ORDER — SODIUM CHLORIDE 9 MG/ML
1000 INJECTION, SOLUTION INTRAVENOUS
Refills: 0 | Status: DISCONTINUED | OUTPATIENT
Start: 2023-09-13 | End: 2023-09-19

## 2023-09-13 RX ORDER — MILRINONE LACTATE 1 MG/ML
0.12 INJECTION, SOLUTION INTRAVENOUS
Qty: 20 | Refills: 0 | Status: DISCONTINUED | OUTPATIENT
Start: 2023-09-13 | End: 2023-09-13

## 2023-09-13 RX ORDER — CHLORHEXIDINE GLUCONATE 213 G/1000ML
1 SOLUTION TOPICAL
Refills: 0 | Status: DISCONTINUED | OUTPATIENT
Start: 2023-09-13 | End: 2023-10-05

## 2023-09-13 RX ORDER — DOBUTAMINE HCL 250MG/20ML
2.5 VIAL (ML) INTRAVENOUS
Qty: 500 | Refills: 0 | Status: DISCONTINUED | OUTPATIENT
Start: 2023-09-13 | End: 2023-09-13

## 2023-09-13 RX ORDER — PHENYLEPHRINE HYDROCHLORIDE 10 MG/ML
0.5 INJECTION INTRAVENOUS
Qty: 40 | Refills: 0 | Status: DISCONTINUED | OUTPATIENT
Start: 2023-09-13 | End: 2023-09-13

## 2023-09-13 RX ORDER — NOREPINEPHRINE BITARTRATE/D5W 8 MG/250ML
0.05 PLASTIC BAG, INJECTION (ML) INTRAVENOUS
Qty: 8 | Refills: 0 | Status: DISCONTINUED | OUTPATIENT
Start: 2023-09-13 | End: 2023-09-13

## 2023-09-13 RX ORDER — CISATRACURIUM BESYLATE 2 MG/ML
10 INJECTION INTRAVENOUS ONCE
Refills: 0 | Status: COMPLETED | OUTPATIENT
Start: 2023-09-13 | End: 2023-09-13

## 2023-09-13 RX ORDER — NOREPINEPHRINE BITARTRATE/D5W 8 MG/250ML
0.3 PLASTIC BAG, INJECTION (ML) INTRAVENOUS
Qty: 16 | Refills: 0 | Status: DISCONTINUED | OUTPATIENT
Start: 2023-09-13 | End: 2023-09-20

## 2023-09-13 RX ORDER — FUROSEMIDE 40 MG
80 TABLET ORAL ONCE
Refills: 0 | Status: COMPLETED | OUTPATIENT
Start: 2023-09-13 | End: 2023-09-13

## 2023-09-13 RX ORDER — NALOXEGOL OXALATE 12.5 MG/1
25 TABLET, FILM COATED ORAL ONCE
Refills: 0 | Status: DISCONTINUED | OUTPATIENT
Start: 2023-09-13 | End: 2023-09-14

## 2023-09-13 RX ORDER — AMIODARONE HYDROCHLORIDE 400 MG/1
0.5 TABLET ORAL
Qty: 450 | Refills: 0 | Status: DISCONTINUED | OUTPATIENT
Start: 2023-09-13 | End: 2023-09-14

## 2023-09-13 RX ORDER — DEXTROSE 50 % IN WATER 50 %
15 SYRINGE (ML) INTRAVENOUS ONCE
Refills: 0 | Status: DISCONTINUED | OUTPATIENT
Start: 2023-09-13 | End: 2023-10-05

## 2023-09-13 RX ORDER — MEROPENEM 1 G/30ML
INJECTION INTRAVENOUS
Refills: 0 | Status: DISCONTINUED | OUTPATIENT
Start: 2023-09-13 | End: 2023-09-20

## 2023-09-13 RX ORDER — DEXTROSE 50 % IN WATER 50 %
25 SYRINGE (ML) INTRAVENOUS ONCE
Refills: 0 | Status: DISCONTINUED | OUTPATIENT
Start: 2023-09-13 | End: 2023-09-19

## 2023-09-13 RX ORDER — HYDROMORPHONE HYDROCHLORIDE 2 MG/ML
1.5 INJECTION INTRAMUSCULAR; INTRAVENOUS; SUBCUTANEOUS
Qty: 100 | Refills: 0 | Status: DISCONTINUED | OUTPATIENT
Start: 2023-09-13 | End: 2023-09-16

## 2023-09-13 RX ORDER — PROPOFOL 10 MG/ML
15 INJECTION, EMULSION INTRAVENOUS
Qty: 1000 | Refills: 0 | Status: DISCONTINUED | OUTPATIENT
Start: 2023-09-13 | End: 2023-09-16

## 2023-09-13 RX ORDER — ALTEPLASE 100 MG
100 KIT INTRAVENOUS ONCE
Refills: 0 | Status: DISCONTINUED | OUTPATIENT
Start: 2023-09-13 | End: 2023-09-13

## 2023-09-13 RX ORDER — SODIUM CHLORIDE 9 MG/ML
10 INJECTION INTRAMUSCULAR; INTRAVENOUS; SUBCUTANEOUS ONCE
Refills: 0 | Status: COMPLETED | OUTPATIENT
Start: 2023-09-13 | End: 2023-09-13

## 2023-09-13 RX ORDER — AMIODARONE HYDROCHLORIDE 400 MG/1
1 TABLET ORAL
Qty: 450 | Refills: 0 | Status: DISCONTINUED | OUTPATIENT
Start: 2023-09-13 | End: 2023-09-13

## 2023-09-13 RX ORDER — ALBUMIN HUMAN 25 %
250 VIAL (ML) INTRAVENOUS ONCE
Refills: 0 | Status: COMPLETED | OUTPATIENT
Start: 2023-09-13 | End: 2023-09-13

## 2023-09-13 RX ORDER — TENECTEPLASE 50 MG
100 KIT INTRAVENOUS ONCE
Refills: 0 | Status: DISCONTINUED | OUTPATIENT
Start: 2023-09-13 | End: 2023-09-13

## 2023-09-13 RX ORDER — VASOPRESSIN 20 [USP'U]/ML
0.04 INJECTION INTRAVENOUS
Qty: 40 | Refills: 0 | Status: DISCONTINUED | OUTPATIENT
Start: 2023-09-13 | End: 2023-09-13

## 2023-09-13 RX ORDER — MILRINONE LACTATE 1 MG/ML
0.38 INJECTION, SOLUTION INTRAVENOUS
Qty: 20 | Refills: 0 | Status: DISCONTINUED | OUTPATIENT
Start: 2023-09-13 | End: 2023-09-13

## 2023-09-13 RX ORDER — GLUCAGON INJECTION, SOLUTION 0.5 MG/.1ML
1 INJECTION, SOLUTION SUBCUTANEOUS ONCE
Refills: 0 | Status: DISCONTINUED | OUTPATIENT
Start: 2023-09-13 | End: 2023-10-05

## 2023-09-13 RX ORDER — PANTOPRAZOLE SODIUM 20 MG/1
40 TABLET, DELAYED RELEASE ORAL EVERY 24 HOURS
Refills: 0 | Status: DISCONTINUED | OUTPATIENT
Start: 2023-09-13 | End: 2023-09-13

## 2023-09-13 RX ORDER — PHENYLEPHRINE HYDROCHLORIDE 10 MG/ML
1.5 INJECTION INTRAVENOUS
Qty: 160 | Refills: 0 | Status: DISCONTINUED | OUTPATIENT
Start: 2023-09-13 | End: 2023-09-15

## 2023-09-13 RX ORDER — CISATRACURIUM BESYLATE 2 MG/ML
20 INJECTION INTRAVENOUS ONCE
Refills: 0 | Status: COMPLETED | OUTPATIENT
Start: 2023-09-13 | End: 2023-09-14

## 2023-09-13 RX ORDER — VASOPRESSIN 20 [USP'U]/ML
0.04 INJECTION INTRAVENOUS
Qty: 40 | Refills: 0 | Status: DISCONTINUED | OUTPATIENT
Start: 2023-09-13 | End: 2023-09-15

## 2023-09-13 RX ORDER — MIDAZOLAM HYDROCHLORIDE 1 MG/ML
4 INJECTION, SOLUTION INTRAMUSCULAR; INTRAVENOUS ONCE
Refills: 0 | Status: DISCONTINUED | OUTPATIENT
Start: 2023-09-13 | End: 2023-09-13

## 2023-09-13 RX ORDER — MIDAZOLAM HYDROCHLORIDE 1 MG/ML
0.02 INJECTION, SOLUTION INTRAMUSCULAR; INTRAVENOUS
Qty: 100 | Refills: 0 | Status: DISCONTINUED | OUTPATIENT
Start: 2023-09-13 | End: 2023-09-13

## 2023-09-13 RX ORDER — CHLORHEXIDINE GLUCONATE 213 G/1000ML
15 SOLUTION TOPICAL EVERY 12 HOURS
Refills: 0 | Status: DISCONTINUED | OUTPATIENT
Start: 2023-09-13 | End: 2023-09-20

## 2023-09-13 RX ORDER — FUROSEMIDE 40 MG
20 TABLET ORAL ONCE
Refills: 0 | Status: COMPLETED | OUTPATIENT
Start: 2023-09-13 | End: 2023-09-13

## 2023-09-13 RX ORDER — AZITHROMYCIN 500 MG/1
TABLET, FILM COATED ORAL
Refills: 0 | Status: DISCONTINUED | OUTPATIENT
Start: 2023-09-13 | End: 2023-09-15

## 2023-09-13 RX ORDER — FENTANYL CITRATE 50 UG/ML
0.5 INJECTION INTRAVENOUS
Qty: 5000 | Refills: 0 | Status: DISCONTINUED | OUTPATIENT
Start: 2023-09-13 | End: 2023-09-13

## 2023-09-13 RX ORDER — PROPOFOL 10 MG/ML
10 INJECTION, EMULSION INTRAVENOUS
Qty: 1000 | Refills: 0 | Status: DISCONTINUED | OUTPATIENT
Start: 2023-09-13 | End: 2023-09-13

## 2023-09-13 RX ORDER — MAGNESIUM SULFATE 500 MG/ML
2 VIAL (ML) INJECTION ONCE
Refills: 0 | Status: COMPLETED | OUTPATIENT
Start: 2023-09-13 | End: 2023-09-13

## 2023-09-13 RX ORDER — MEROPENEM 1 G/30ML
1000 INJECTION INTRAVENOUS EVERY 8 HOURS
Refills: 0 | Status: DISCONTINUED | OUTPATIENT
Start: 2023-09-13 | End: 2023-09-20

## 2023-09-13 RX ORDER — PIPERACILLIN AND TAZOBACTAM 4; .5 G/20ML; G/20ML
4.5 INJECTION, POWDER, LYOPHILIZED, FOR SOLUTION INTRAVENOUS ONCE
Refills: 0 | Status: DISCONTINUED | OUTPATIENT
Start: 2023-09-13 | End: 2023-09-13

## 2023-09-13 RX ORDER — MILRINONE LACTATE 1 MG/ML
0.38 INJECTION, SOLUTION INTRAVENOUS
Qty: 20 | Refills: 0 | Status: DISCONTINUED | OUTPATIENT
Start: 2023-09-13 | End: 2023-09-14

## 2023-09-13 RX ORDER — AZITHROMYCIN 500 MG/1
500 TABLET, FILM COATED ORAL EVERY 24 HOURS
Refills: 0 | Status: DISCONTINUED | OUTPATIENT
Start: 2023-09-14 | End: 2023-09-15

## 2023-09-13 RX ORDER — PANTOPRAZOLE SODIUM 20 MG/1
40 TABLET, DELAYED RELEASE ORAL DAILY
Refills: 0 | Status: DISCONTINUED | OUTPATIENT
Start: 2023-09-14 | End: 2023-10-05

## 2023-09-13 RX ORDER — EPINEPHRINE 0.3 MG/.3ML
0.03 INJECTION INTRAMUSCULAR; SUBCUTANEOUS
Qty: 8 | Refills: 0 | Status: DISCONTINUED | OUTPATIENT
Start: 2023-09-13 | End: 2023-09-13

## 2023-09-13 RX ORDER — AZITHROMYCIN 500 MG/1
500 TABLET, FILM COATED ORAL ONCE
Refills: 0 | Status: COMPLETED | OUTPATIENT
Start: 2023-09-13 | End: 2023-09-13

## 2023-09-13 RX ORDER — EPINEPHRINE 0.3 MG/.3ML
0.03 INJECTION INTRAMUSCULAR; SUBCUTANEOUS
Qty: 4 | Refills: 0 | Status: DISCONTINUED | OUTPATIENT
Start: 2023-09-13 | End: 2023-09-13

## 2023-09-13 RX ORDER — HEPARIN SODIUM 5000 [USP'U]/ML
INJECTION INTRAVENOUS; SUBCUTANEOUS
Qty: 25000 | Refills: 0 | Status: DISCONTINUED | OUTPATIENT
Start: 2023-09-13 | End: 2023-09-13

## 2023-09-13 RX ORDER — FUROSEMIDE 40 MG
40 TABLET ORAL ONCE
Refills: 0 | Status: COMPLETED | OUTPATIENT
Start: 2023-09-13 | End: 2023-09-13

## 2023-09-13 RX ORDER — PANTOPRAZOLE SODIUM 20 MG/1
40 TABLET, DELAYED RELEASE ORAL ONCE
Refills: 0 | Status: COMPLETED | OUTPATIENT
Start: 2023-09-13 | End: 2023-09-13

## 2023-09-13 RX ORDER — VANCOMYCIN HCL 1 G
1000 VIAL (EA) INTRAVENOUS ONCE
Refills: 0 | Status: DISCONTINUED | OUTPATIENT
Start: 2023-09-13 | End: 2023-09-13

## 2023-09-13 RX ORDER — FENTANYL CITRATE 50 UG/ML
100 INJECTION INTRAVENOUS ONCE
Refills: 0 | Status: DISCONTINUED | OUTPATIENT
Start: 2023-09-13 | End: 2023-09-13

## 2023-09-13 RX ORDER — MEROPENEM 1 G/30ML
1000 INJECTION INTRAVENOUS ONCE
Refills: 0 | Status: COMPLETED | OUTPATIENT
Start: 2023-09-13 | End: 2023-09-13

## 2023-09-13 RX ORDER — SODIUM BICARBONATE 1 MEQ/ML
50 SYRINGE (ML) INTRAVENOUS
Refills: 0 | Status: DISCONTINUED | OUTPATIENT
Start: 2023-09-13 | End: 2023-09-13

## 2023-09-13 RX ADMIN — PROPOFOL 11.8 MICROGRAM(S)/KG/MIN: 10 INJECTION, EMULSION INTRAVENOUS at 17:01

## 2023-09-13 RX ADMIN — PIPERACILLIN AND TAZOBACTAM 25 GRAM(S): 4; .5 INJECTION, POWDER, LYOPHILIZED, FOR SOLUTION INTRAVENOUS at 05:14

## 2023-09-13 RX ADMIN — Medication 20 MILLIGRAM(S): at 05:31

## 2023-09-13 RX ADMIN — Medication 50 MILLILITER(S): at 14:37

## 2023-09-13 RX ADMIN — CISATRACURIUM BESYLATE 23.6 MICROGRAM(S)/KG/MIN: 2 INJECTION INTRAVENOUS at 06:32

## 2023-09-13 RX ADMIN — FENTANYL CITRATE 100 MICROGRAM(S): 50 INJECTION INTRAVENOUS at 17:01

## 2023-09-13 RX ADMIN — PHENYLEPHRINE HYDROCHLORIDE 36.9 MICROGRAM(S)/KG/MIN: 10 INJECTION INTRAVENOUS at 17:02

## 2023-09-13 RX ADMIN — SENNA PLUS 10 MILLILITER(S): 8.6 TABLET ORAL at 18:13

## 2023-09-13 RX ADMIN — VASOPRESSIN 6 UNIT(S)/MIN: 20 INJECTION INTRAVENOUS at 07:00

## 2023-09-13 RX ADMIN — FENTANYL CITRATE 3.28 MICROGRAM(S)/KG/HR: 50 INJECTION INTRAVENOUS at 06:32

## 2023-09-13 RX ADMIN — AMIODARONE HYDROCHLORIDE 16.7 MG/MIN: 400 TABLET ORAL at 19:35

## 2023-09-13 RX ADMIN — HYDROMORPHONE HYDROCHLORIDE 1 MG/HR: 2 INJECTION INTRAMUSCULAR; INTRAVENOUS; SUBCUTANEOUS at 19:37

## 2023-09-13 RX ADMIN — MIDAZOLAM HYDROCHLORIDE 4 MILLIGRAM(S): 1 INJECTION, SOLUTION INTRAMUSCULAR; INTRAVENOUS at 08:16

## 2023-09-13 RX ADMIN — MIDAZOLAM HYDROCHLORIDE 4 MILLIGRAM(S): 1 INJECTION, SOLUTION INTRAMUSCULAR; INTRAVENOUS at 17:00

## 2023-09-13 RX ADMIN — CISATRACURIUM BESYLATE 10 MILLIGRAM(S): 2 INJECTION INTRAVENOUS at 06:20

## 2023-09-13 RX ADMIN — Medication 25 GRAM(S): at 13:00

## 2023-09-13 RX ADMIN — PROPOFOL 11.8 MICROGRAM(S)/KG/MIN: 10 INJECTION, EMULSION INTRAVENOUS at 19:35

## 2023-09-13 RX ADMIN — Medication 500 MILLILITER(S): at 14:37

## 2023-09-13 RX ADMIN — AMIODARONE HYDROCHLORIDE 16.7 MG/MIN: 400 TABLET ORAL at 17:01

## 2023-09-13 RX ADMIN — ARGATROBAN 2.3 MICROGRAM(S)/KG/MIN: 50 INJECTION, SOLUTION INTRAVENOUS at 19:37

## 2023-09-13 RX ADMIN — PANTOPRAZOLE SODIUM 40 MILLIGRAM(S): 20 TABLET, DELAYED RELEASE ORAL at 18:14

## 2023-09-13 RX ADMIN — POLYETHYLENE GLYCOL 3350 17 GRAM(S): 17 POWDER, FOR SOLUTION ORAL at 18:13

## 2023-09-13 RX ADMIN — VASOPRESSIN 9 UNIT(S)/MIN: 20 INJECTION INTRAVENOUS at 19:35

## 2023-09-13 RX ADMIN — VASOPRESSIN 9 UNIT(S)/MIN: 20 INJECTION INTRAVENOUS at 17:01

## 2023-09-13 RX ADMIN — PROPOFOL 7.87 MICROGRAM(S)/KG/MIN: 10 INJECTION, EMULSION INTRAVENOUS at 06:20

## 2023-09-13 RX ADMIN — Medication 3: at 23:26

## 2023-09-13 RX ADMIN — FENTANYL CITRATE 1.31 MICROGRAM(S)/KG/HR: 50 INJECTION INTRAVENOUS at 17:02

## 2023-09-13 RX ADMIN — MEROPENEM 100 MILLIGRAM(S): 1 INJECTION INTRAVENOUS at 18:14

## 2023-09-13 RX ADMIN — AZITHROMYCIN 255 MILLIGRAM(S): 500 TABLET, FILM COATED ORAL at 18:14

## 2023-09-13 RX ADMIN — Medication 1 MILLIGRAM(S): at 02:37

## 2023-09-13 RX ADMIN — Medication 36.9 MICROGRAM(S)/KG/MIN: at 17:02

## 2023-09-13 RX ADMIN — Medication 50 MILLIGRAM(S): at 14:00

## 2023-09-13 RX ADMIN — Medication 500 MILLILITER(S): at 14:36

## 2023-09-13 RX ADMIN — Medication 50 MILLILITER(S): at 03:43

## 2023-09-13 RX ADMIN — Medication 125 MILLILITER(S): at 14:38

## 2023-09-13 RX ADMIN — Medication 80 MILLIGRAM(S): at 07:20

## 2023-09-13 RX ADMIN — Medication 40 MILLIGRAM(S): at 08:00

## 2023-09-13 RX ADMIN — CHLORHEXIDINE GLUCONATE 15 MILLILITER(S): 213 SOLUTION TOPICAL at 18:14

## 2023-09-13 RX ADMIN — Medication 36.9 MICROGRAM(S)/KG/MIN: at 19:36

## 2023-09-13 RX ADMIN — Medication 80 MILLIGRAM(S): at 07:37

## 2023-09-13 RX ADMIN — PHENYLEPHRINE HYDROCHLORIDE 36.9 MICROGRAM(S)/KG/MIN: 10 INJECTION INTRAVENOUS at 19:36

## 2023-09-13 RX ADMIN — Medication 500 MICROGRAM(S): at 13:30

## 2023-09-13 RX ADMIN — Medication 9.83 MICROGRAM(S)/KG/MIN: at 08:05

## 2023-09-13 NOTE — PROVIDER CONTACT NOTE (OTHER) - REASON
rapid hear rate
pt SaO2 dropped to 70, RR 40s, HR remains 140-150.
pt SA02 83, RR 45, 
Patient's status
pt RR 40
pt tachycardic , tachypneic RR 40s

## 2023-09-13 NOTE — PATIENT PROFILE ADULT - NSPROMEDSBROUGHTTOHOSP_GEN_A_NUR
Detail Level: Detailed Wound Evaluated By (Optional): Liset Mejia, Dr. Acevedo Wound Diameter In Cm(Optional): 0 Wound Edema?: mild yes

## 2023-09-13 NOTE — PROCEDURE NOTE - NSSITEPREP_SKIN_A_CORE
chlorhexidine/Adherence to aseptic technique: hand hygiene prior to donning barriers (gown, gloves), don cap and mask, sterile drape over patient
Adherence to aseptic technique: hand hygiene prior to donning barriers (gown, gloves), don cap and mask, sterile drape over patient
chlorhexidine

## 2023-09-13 NOTE — PROCEDURE NOTE - NSPROCDETAILS_GEN_ALL_CORE
USG/location identified, draped/prepped, sterile technique used/blood seen on insertion/dressing applied/flushes easily/secured in place/sterile technique, catheter placed
location identified, draped/prepped, sterile technique used/blood seen on insertion/dressing applied/flushes easily/secured in place/sterile technique, catheter placed
location identified, draped/prepped, sterile technique used, needle inserted/introduced/positive blood return obtained via catheter/connected to a pressurized flush line/sutured in place/hemostasis with direct pressure, dressing applied/Seldinger technique/all materials/supplies accounted for at end of procedure
sterile technique, indwelling urinary device inserted/a urinary catheter insertion kit was used for all insertion materials
guidewire recovered/lumen(s) aspirated and flushed/sterile dressing applied/sterile technique, catheter placed/ultrasound guidance with use of sterile gel and probe cove

## 2023-09-13 NOTE — PATIENT PROFILE ADULT - FALL HARM RISK - RISK INTERVENTIONS
Physically safe environment - no spills, clutter or unnecessary equipment/Purposeful Proactive Rounding

## 2023-09-13 NOTE — PROVIDER CONTACT NOTE (OTHER) - RECOMMENDATIONS
continue to monitor pt. 12 lead ecg to be done. team will come see pt if HR doesn't decrease over time.
ekg ordered  ns boilus ordered.metoprolol ordered.will be move to a monitored unit
pt to be placed back onto BIPAP. 12 lead ecg done- pt in aflutter. lopressor to be given if HR doesn't break.
ativan or other medication to help pt relax.
Provider should see patient at bedside.
respiratory therapist paged to put pt back on highflow

## 2023-09-13 NOTE — PROVIDER CONTACT NOTE (OTHER) - SITUATION
pt RR 40 on BIPAP.
Provider made aware that patient had desaturation to 89% on 2L NC; pt placed on 3L NC (SPO2 93%); , T 100.0 F oral; /81. Patient states that she feels "like she is going to die".
pt SaO2 dropped to 70, RR 40s, HR remains 140-150.
pt complained of palpitation    /81 RR17  temp 98.6
pt anxious w/ , RR 45, Sa02 83% on BIPAP w/ Fio2 of 100%  pt requesting break from BIPAP
pt  and RR 42.

## 2023-09-13 NOTE — PROVIDER CONTACT NOTE (EICU) - BACKGROUND
Zo Hager is a 62 year old woman with history of a fib presenting with hypoxic respiratory failure found to have ILD 2/2 mixed connective tissue disease. PAtient treated with steroids. Attempted cellcept, but pt had arrhythmia. Over last 3-4 days, patient with increasing oxygen requirements started on HFNC. Overnight, increasing oxygen requirements. Trialed bipap without improvement. Intubated, sedated, paralyzed. Saturation on 100% FiO2 and PEEP 18 ~60%. [facility] ECMO team consulted.

## 2023-09-13 NOTE — CHART NOTE - NSCHARTNOTEFT_GEN_A_CORE
:  Enzo Bolaños     INDICATION: ECMO     PROCEDURE:   [x] LIMITED ECHO  [x] LIMITED CHEST  [x] LIMITED RETROPERITONEAL  [ ] LIMITED ABDOMINAL  [x] LIMITED DVT  [ ] NEEDLE GUIDANCE VASCULAR  [ ] NEEDLE GUIDANCE THORACENTESIS  [ ] NEEDLE GUIDANCE PARACENTESIS  [ ] NEEDLE GUIDANCE PERICARDIOCENTESIS  [ ] OTHER     FINDINGS:  Lung  - bilateral B lines  - pleural thickening and irregularity bilateral  - bilateral basilar consolidations with air bronchograms    Cardiac (in AF at rate of 100-110)  - LV > RV  - mild septal flattening in systole and diastole  - normal LV systolic function  - LVOT diameter 2cm  - LVOT VTI average 17cm  - no significant aortic or mitral valvular regurgitation or stenosis  - PV regurgitation noted  - PV acceleration time 102ms  - UT Vmax 2.31 m/s   - End-diastolic UT All 2.13 m/s    Hemodynamics (calculated)  - LVOT area - 3.14 cm^2  - LV SV = 52.7 cm^3  - CO 5.5 L/min  - CI 2.3 L/min/m^2  - DO2 = 0.3 L/min/m^2    Renal  - no hydronephrosis bilateral    DVT  - no DVT visualized in L leg  - limited exam on R leg due to ecmo cannula but no DVT noted in popliteal vein     INTERPRETATION:     Images stored in qpath    Supervised by    Should not be considered final until signed by attending. :  Enzo Bolaños     INDICATION: ECMO     PROCEDURE:   [x] LIMITED ECHO  [x] LIMITED CHEST  [x] LIMITED RETROPERITONEAL  [ ] LIMITED ABDOMINAL  [x] LIMITED DVT  [ ] NEEDLE GUIDANCE VASCULAR  [ ] NEEDLE GUIDANCE THORACENTESIS  [ ] NEEDLE GUIDANCE PARACENTESIS  [ ] NEEDLE GUIDANCE PERICARDIOCENTESIS  [ ] OTHER     FINDINGS:  Lung  - bilateral B lines  - pleural thickening and irregularity bilateral  - bilateral basilar consolidations with air bronchograms    Cardiac (in AF at rate of 100-110)  - LV = RV  - mild septal flattening in systole and diastole  - normal LV systolic function  - LVOT diameter 2cm  - LVOT VTI average 17cm  - no significant aortic or mitral valvular regurgitation or stenosis  - PV regurgitation noted  - PV acceleration time 102ms  - NM Vmax 2.31 m/s   - End-diastolic NM All 2.13 m/s    Hemodynamics (calculated)  - LVOT area - 3.14 cm^2  - LV SV = 52.7 cm^3  - CO 5.5 L/min  - CI 2.3 L/min/m^2  - DO2 = 0.3 L/min/m^2    Renal  - no hydronephrosis bilateral    DVT  - no DVT visualized in L leg  - limited exam on R leg due to ecmo cannula but no DVT noted in popliteal vein     INTERPRETATION:     Images stored in qpath    Supervised by    Should not be considered final until signed by attending.    Attending Attestation:  I was present during the key portions of the procedure and immediately available during the entire procedure.  Jozef Borden MD  Attending  Pulmonary & Critical Care Medicine

## 2023-09-13 NOTE — PROCEDURE NOTE - NSURITECHNIQUE_GU_A_CORE
Proper hand hygiene was performed/Sterile gloves were worn for the duration of the procedure/A sterile drape was used to cover all adjacent areas/The catheter was appropriately lubricated/The catheter was secured with a securement device (e.g. StatLock)/The collection bag is below the level of the patient and urinary bladder/All applicable medical record documentation is completed

## 2023-09-13 NOTE — PRE-ANESTHESIA EVALUATION ADULT - NSANTHOSAYNRD_GEN_A_CORE
No. JACEY screening performed.  STOP BANG Legend: 0-2 = LOW Risk; 3-4 = INTERMEDIATE Risk; 5-8 = HIGH Risk
No. JACEY screening performed.  STOP BANG Legend: 0-2 = LOW Risk; 3-4 = INTERMEDIATE Risk; 5-8 = HIGH Risk

## 2023-09-13 NOTE — PROGRESS NOTE ADULT - ASSESSMENT
62 year old female with a past medical history of afib, and sciatica, who presented to [facility] for shortness of breath found to be hypoxic and have interstitial lung disease, admitted for AHRF, further ILD workup and management; course c/b tachyarrhytmia from AF w RVR and SVT, now transferred to telemetry.

## 2023-09-13 NOTE — PROGRESS NOTE ADULT - ATTENDING COMMENTS
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  This evening patient with worsening hypoxia, tachypnea, accessory muscles use - upgraded to 100% and BiPAP with high EPAP, but still with persistent hypoxia (SaO2 79-82%),RR still > 40, still with sinus tachy.  Decision was made for transfer to ICU and mechanical ventilation; consider pulse dose steroids and rheumatology re-evaluation.

## 2023-09-13 NOTE — H&P ADULT - NSHPPHYSICALEXAM_GEN_ALL_CORE
CONST:               Ill-appearing. Intubated, sedated, on ECMO  EYES:                   conjunctiva and sclera clear; PERRL; no anisicoria; ocular lubricant noted.  RESP/CHEST:      Ventilator sounds symmetric bilaterally. No wheezing. No chest wall crepitus.  CARDIOVASC:     RRR, normal S1/S2, no M/R/G; no appreciable JVD  ABDOMEN:         Soft, NT/ND, no organomegaly noted  EXT:                     Warm to touch. Anasarca. Cap refill <2s; pulses are 2+ B/L  NEURO:               Sedated to RASS -4. Paralyzed.  SKIN:                   No diffuse rashes; no pressure injury noted; no mottling    TUBES/LINES/DRAINS:  [x] ETT  [x] NGT/OGT  [] Peripheral IV  [x] Central Venous Line     	[] R	[x] L	[x] IJ	[] Fem	[] SC	Date Placed: 9/13/23  [x] ECMO inflow                           [x] R      [] L         [x] Fem             Date Placed 9/13/23   [x] ECMO outflow                         [x] R	[] L	[x] IJ	[] Fem	Date Placed: 9/13/23  [x] Arterial Line		[] R	[x] L	[] Fem	[] Rad	[x] Ax	Date Placed: 9/13/23  [] PICC:         	[] Midline		[] Mediport  [x] Urinary Catheter		Date Placed:     ICU Vital Signs Last 24 Hrs  T(F): 98.4 (13 Sep 2023 05:00), Max: 98.7 (12 Sep 2023 13:35)  HR: 124 (13 Sep 2023 08:43) (69 - 158)  BP: 84/45 (13 Sep 2023 08:43) (60/39 - 138/93)  BP(mean): 62 (13 Sep 2023 08:43) (45 - 110)  ABP: 101/58 (13 Sep 2023 08:25) (72/48 - 101/58)  ABP(mean): 71 (13 Sep 2023 08:25) (54 - 71)  RR: 28 (13 Sep 2023 08:43) (18 - 45)  SpO2: 79% (13 Sep 2023 08:43) (57% - 100%)

## 2023-09-13 NOTE — PROCEDURE NOTE - NSPOSTPRCRAD_GEN_A_CORE
central line located in the superior vena cava/depth of insertion/line adjusted to depth of insertion/no pneumothorax/post procedure radiography not performed/post-procedure radiography performed

## 2023-09-13 NOTE — PROGRESS NOTE ADULT - ATTENDING SUPERVISION STATEMENT
Fellow
Fellow
Resident
Fellow
Resident
Resident
Fellow
Fellow
Resident
Fellow
Resident
Fellow
Resident

## 2023-09-13 NOTE — PROCEDURE NOTE - NSINFORMCONSENT_GEN_A_CORE
This was an emergent procedure.
Benefits, risks, and possible complications of procedure explained to patient/caregiver who verbalized understanding and gave verbal consent.
This was an emergent procedure.
Benefits, risks, and possible complications of procedure explained to patient/caregiver who verbalized understanding and gave verbal consent.
Benefits, risks, and possible complications of procedure explained to patient/caregiver who verbalized understanding and gave verbal consent.

## 2023-09-13 NOTE — PROGRESS NOTE ADULT - NUTRITIONAL ASSESSMENT
This patient has been assessed with a concern for Malnutrition and has been determined to have a diagnosis/diagnoses of Morbid obesity (BMI > 40).    This patient is being managed with:   Diet Regular-  Supplement Feeding Modality:  Oral  Ensure Enlive Cans or Servings Per Day:  1       Frequency:  Three Times a day  Entered: Sep 12 2023  5:16PM

## 2023-09-13 NOTE — PROGRESS NOTE ADULT - PROBLEM SELECTOR PLAN 3
Unclear etiology, likely myositis since pt has been having worsening weakness with movement of extremities vs Cellcept s/e. Symptoms improving subjectively.     - neuro consulted, now signed off  - follow up with Dr. Nik Marie or Dante Urbano for EMG upon discharge. Contact: [phone]

## 2023-09-13 NOTE — CHART NOTE - NSCHARTNOTEFT_GEN_A_CORE
Culture - Blood (collected 12 Sep 2023 03:43)  Source: .Blood Blood  Preliminary Report (13 Sep 2023 06:00):    No growth at 1 day.    Culture - Blood (collected 12 Sep 2023 03:43)  Source: .Blood Blood  Preliminary Report (13 Sep 2023 06:00):    No growth at 1 day.    Urinalysis with Rflx Culture (collected 12 Sep 2023 02:56)      Pro- (2023 12:28)  Pro-BNP 1511 (2023 06:21)      CRP 18.1 (2023 6:08)  Aldolase 12.1 (2023 6:08)  Procalcitonin 0.29 (2023 6:21)  C3 99 (2023 05:30)  C4 12 (2023 05:30)  TSH 2.36 (2023 05:30)  A1c 6.1 (2023 05:30)    HCV S/CO 0/05  HCV Interpretation Negative  HBSAg Nonreactive  HBSAb Nonreactive  HBCAb IgM Nonreactive  HBCAb Total Nonreactive  HA IgM Nonreactive    Legionella Antigen, Urine – Negative (2023 13:27)  Legionella Antigen, Urine – Negative (2023 04:12)  Streptococcus pneumoniae Ag, Urine – Negative (2023 13:27)  Streptococcus pneumoniae Ag, Urine – Negative (2023 04:12)    Rapid RVP – Not Detec (2023 12:55)  Rapid RVP – Not Detec (09- 11:24)  SARS-CoV-2– Not Detec (2023 12:55)  SARS-CoV-2– Not Detec (09- 11:24)  COVID-19 Nucleocapsid Total ALLIE Ab 0.08 - Negative (2023 14:56)  COVID-19 Jeremy Domain Ab >250 - Positive (2023 14:56)      ARIANA 1:1280  ARIANA Pattern Speckled  DsDNA <12  SM (Alejandro) Ab FBIA >8  Anti-RNP >8  c-ANCA Negative  p-ANCA Negative  Atypical ANCA Indeterminate  Anti-SS-A Ab 0.4  Anti-SS-B Ab <0.2  RF Quant <10  CCP Ab IgG Negative  Centromere Ab <0.2  RNA Polymerase III IgG 16    Systemic Sclerosis 12 Ab Pnl 2 (2023 05:30)  CCP <8  Scl 70 <11  CENP-A <11  RP11 <11   <11  U1-snRNP RNP A 162  U1-snRNP RNP C 71  U1-snRNP RNP-70kd 111  Fibrillarin <11  Th/To <11  PM/Scl-100 <11  PM/Scl-75 <11    Hypersensitivity Pneumonitis Panel – Negative (2023 6:06)    Bronchoscopy results (2023 12:54)  -	Fungitell B-D Galactomanan 93   -	Aspergillus Galactomannan Antigen 1.75  -	Appearance – Hazy  -	Color – No Color  -	Total Nucleated Cell Count – 19   -	Total RBC Count – 1123   -	Neutrophils 13  -	BF Lymphocytes 2  -	Monocytes/Macrophages 4   -	Cytopathology  o	Final Diagnosis  o	BRONCHOALVEOLAR LAVAGE, RIGHT LOWER LOBE:  o	NEGATIVE FOR MALIGNANT CELLS.  o	Pulmonary macrophages, bronchial cells and squamous cells.  -	Surgical Pathology  o	Final Diagnosis  o	Lung, right lower lobe; biopsy:  o	Bronchial tissue and interstitium showing organizing pneumonia and focal fibrin - See Note.  o	Note: Multiple deeper levels were performed on block 1A. The histologic findings are that of organizing pneumonia and focal fibrin. No granuloma, neoplasm or interstitial fibrosis is identified. Clinical and radiographic correlation is suggested. This case was also reviewed virtually at the [facility]Wide Thoracic Pathology Consensus Conference on 2023, and again virtually to Dr. Andino on 2023.  -	TM Interpretation  o	Diagnosis:  o	Bronchioalveolar Lavage  o	Flow cytometric analysis attempted, however insufficient cells to report.    Pending Tests  -	Serum immunofixation (2023)  -	Urine immunofixation (2023)  -	Myomarker Panel 3 (2023)  -	Myomarker Panel 3 (2023)  -	Vitamin B6 (2023)      RADIOLOGY & ADDITIONAL TESTS:  BRONCHOSCOPY (2023 11:30)  Procedure Performed: Bronchoscopy with BAL and TBBx    The bronchoscope was inserted with ease and the airway examined to subsegments bilaterally. BAL performed in RB8 followed by TBBX performed in RB8 3 subsegments. Patient tolerated procedure well.    Limitations/Complications:  There were no apparent limitations or complications    Findings:  Normal distal trachea and main orlando. Scant mucous bilaterally. No endobronchial lesions. Mildly ectatic airways.    Impressions:  Normal anatomy, mildly ectatic airways and scant secretions. .    Plan:  CXR in endo may need higher supplemental oxygen few hours after procedure, wean off, incentive spirometry    Specimens:  BAL for cell count, diff, CD4/8, micro, GM, fungitell and cyto. TBBX for path    IMAGING    US DPLX LWR EXT VEINS     PROCEDURE DATE:  2023          INTERPRETATION:  CLINICAL INFORMATION: Tachycardia with oxygen   desaturation.    COMPARISON: None available.    TECHNIQUE: Duplex sonography of the BILATERAL LOWER extremity veins with   color and spectral Doppler, with and without compression.    FINDINGS:    RIGHT:  Normal compressibility of the RIGHT common femoral, femoral and popliteal   veins.  Doppler examination shows normal spontaneous and phasic flow.  No RIGHT calf vein thrombosis is detected.    LEFT:  Normal compressibility of the LEFT common femoral, femoral and popliteal   veins.  Doppler examination shows normal spontaneous and phasic flow.  No LEFT calf vein thrombosis is detected.    IMPRESSION:  No evidence of deep venous thrombosis in either lower extremity.      CT ANGIO CHEST PULM ART   PROCEDURE DATE:  2023          INTERPRETATION:  CONTRAST/COMPLICATIONS:  IV Contrast: Isovue 370  154 cc administered   46 cc discarded  Oral Contrast: NONE  Complications: None reported at time of study completion    CTA (CT angiography) of the CHEST dated 2023 3:10 PM    INDICATION: dyspnea, recent travel r/o PE    PRIOR STUDY: None.    FINDINGS: No visualized PE. The pulmonary trunk measures 3.3 cm diameter.     The heart is enlarged.     Trace left lateral asymmetric pericardial effusion is seen.     The great vessels are unremarkable. Left vertebral artery arises   directly from the aortic arch.     No mediastinal or hilar lymphadenopathy is seen.    Mediastinal lipomatosis.    Evaluation of the pulmonary parenchyma demonstrates bilateral   interstitial lung disease in the upper, mid and lower zones with   peripheral groundglass opacities and reticulation. Less involvement of   the bilateral upper zones. No traction bronchiectasis or honeycombing.    Bilateral air trapping.     No pleural effusions are seen.    Limited evaluation of the upper abdomen demonstrates 1.6 cm hypodensity   in segment one likely cyst. Small to moderate hiatal hernia.    Evaluation of the osseous structures demonstrates no significant   abnormality.      IMPRESSION:  No visualized PE.    Bilateral interstitial lung disease. Differential diagnosis includes  HP,NSIP, atypical pneumonia.      CT CHEST   ORDERED BY: YANELIS ZAPATA   PROCEDURE DATE:  2023        INTERPRETATION:  CLINICAL INFORMATION: Interstitial lung disease    COMPARISON: 2023    CONTRAST/COMPLICATIONS:  IV Contrast: None  Oral Contrast: None  Complications: None    PROCEDURE:  CT of the Chest was performed. Prone inspiratory and supine expiratory   scans were performed.  Sagittal and coronal reformats were performed.    FINDINGS:    LUNGS AND AIRWAYS: Patent central airways.  Again, bilateral groundglass   and reticular opacities increasing from apices to bases, both peripheral   as well as extending centrally along bronchovascular bundles. Mild   traction bronchiectasis and bronchiolectasis. No honeycombing. Mild   expiratory air trapping.  PLEURA: No pleural effusion.  MEDIASTINUM AND NIRMALA: No lymphadenopathy.  VESSELS: Within normal limits.  HEART: Heart size is normal. No pericardial effusion.  CHEST WALL AND LOWER NECK: Within normal limits.  VISUALIZED UPPER ABDOMEN: Again, probable caudate lobe cyst and moderate   hiatal hernia. BONES: Within normal limits.      IMPRESSION:  Since 2023, again interstitial lung disease non-UIP   pattern. Although no subpleural sparing, possibly interstitial pneumonia,   differential includes NSIP associated with connective tissue disorders,   chronic HP or drug toxicity.      CT ANGIO CHEST PULM   PROCEDURE DATE:  2023          INTERPRETATION:  CLINICAL INFORMATION: New tachycardia, desaturation    COMPARISON: 2023    CONTRAST/COMPLICATIONS:  IV Contrast: Isovue 370  90 cc administered   10 cc discarded  Oral Contrast: NONE  Complications: None reported at time of study completion    PROCEDURE:  CT Angiography of the Chest.  Sagittal and coronal reformats were performed as well as 3D (MIP)   reconstructions.    FINDINGS:    LUNGS AND AIRWAYS: Patent central airways.  Bilateral mild groundglass   opacities and increased reticular markings/opacities overall   decreased/improved compared to the previous study. Extending from the   apices to the lung bases and both peripheral and central. No   honeycombing. A new small focus of consolidative changes seen in the   lingula. Otherwise, no new areas of lung disease  PLEURA: No pleural effusion.  MEDIASTINUM AND NIRMALA: No lymphadenopathy.  VESSELS: No pulmonary embolism. No thoracic aortic dissection or aneurysm.  HEART: Heart size is normal. No pericardial effusion.  CHEST WALL AND LOWER NECK: Within normal limits.  VISUALIZED UPPER ABDOMEN: Small hiatal hernia  BONES: Small low-attenuation focus noted in the caudate lobe, stable,   likely a small cyst.    IMPRESSION:  1.  Improved/decreased extent of bilateral groundglass opacities and   reticular markings compared to the previous study. Findings are   nonspecific but differential etiologies include interstitial pneumonia,   drug toxicity, nonspecific connective tissue disorders.  2.  New small focus of parenchymal consolidation seen in the lingula;   possible small focal pneumonia.  3.  No pulmonary embolism      TRANSTHORACIC ECHOCARDIOGRAM REPORT  PROCEDURE DATE:  2023      Patient Name:   ALIZA FOLEY Date of Exam:        2023  Medical Rec #:  [id number]          Height/Weight:       68.0 in / 288.81 lb  Account #:      [id number]         BSA:                 2.39 m²  YOB: 1960       BP:                  130/67 mmHg  Patient Age:    62 years         HR:                  92 bpm  Patient Gender: F                Sonographer:         Gretchen Raymundo                                   Referring Physician: GEORGETTE SHANKS      ---------------------------------------------------------------------------  -----  CPT:                ECHO TTE WITH CON COMP W DOPP - ; - 6279089.m  Indication(s):      R06.00 - Dyspnea, unspecified  Procedure:          A complete two-dimensional transthoracic   echocardiogram was                      performed: 2D, M-mode, spectral and color flow   Doppler.  Ordering Location:  [facility]    Contrast Injection: Contrast injection with an imaging solution Definity   was                      performed to enhance endocardial border definition.  Study Comments:     SOB      ---------------------------------------------------------------------------  -----  CONCLUSIONS:     1. Normal left and right ventricular size and systolic function.   2. Normal atria.   3. No significant valvular disease.   4. No evidence of pulmonary hypertension.   5. No pericardial effusion.   6. No prior echo is available for comparison.    ---------------------------------------------------------------------------  -----  2D AND M-MODE MEASUREMENTS (normal ranges within parentheses):    Left Ventricle:         Normal - Men Normal - Women  IVSd (2D):      0.99 cm  (0.6-1.0)     (0.6-0.9)  LVPWd (2D):     1.33 cm  (0.6-1.0)     (0.6-0.9)  LVIDd (2D):     4.82 cm  (4.2-5.8)     (3.8-5.2)  LVIDs (2D):     3.51 cm  LV FS (2D):     27.2 %     (>25%)  LV EF (MOD BP):  61 %      (>55%)  Aorta/Left Atrium:         Normal - Men Normal - Women  Aortic Root (2D):  3.10 cm  (2.4-3.7)    LA Volume A/L:    Right Ventricle:    LV DIASTOLIC FUNCTION:    MV Peak E: 69.20 cm/s MV e' lat:  7.3 cm/s MV E/e' lat ratio: 9.5  MV Peak A: 68.70 cm/s MV e' med:  5.1 cm/s MV E/e' med ratio: 13.5  E/A Ratio: 1.01       Decel Time: 200 msec MV E/e' av.5    SPECTRAL DOPPLER ANALYSIS:    Mitral Valve:  MV Max All:   MV P1/2 Time: 58.00 msec  MV Mean Grad: MV Area, PHT: 3.79 cm²      Aortic Valve: AoV Max All:    1.81 m/s AoV Peak P mmHg  AoV Mean   P mmHg  LVOT Vmax:      1.31 m/s LVOT VTI:      0.241 m  LVOT Diameter: 2.10 cm  AoV Area, VMax: 2.51 cm² AoV Area, VTI: 3.20 cm² AoV Area, Vmn: 2.28 cm²      Tricuspid Valve and PA/RV Systolic Pressure: TR Max Velocity:       RA   Pressure: 3 mmHg  RVSP/PASP:  TAPSE:           19 mm RV S':       17 cm/s      ---------------------------------------------------------------------------  -----  FINDINGS:    Left Ventricle:  The left ventricle is normal in size, wall thickness, and systolic   function with a calculated ejection fraction of 60-65%. There are no   regional wall motion abnormalities seen. There is normal left ventricular   diastolic function and filling pressure.    Right Ventricle:  The right ventricle is normal in size. Right ventricular systolic   function is normal. The tricuspid annular plane systolic excursion   (TAPSE) is 18.80 mm (normal >=17 mm).    Left Atrium:  The left atrium is normal in size. Left atrial volume index (BERNARD) is   17.3 ml/m².    Right Atrium:  The right atrium is normal in size. Right atrial volume index (ETHAN) is   13.70 ml/m².    Aortic Valve:  The aortic valve is tricuspid with normal structure and function without   stenosis. There is trace aortic regurgitation.    Mitral Valve:  Structurally normal mitral valve with normal leaflet excursion. There is   trace mitral regurgitation.    Tricuspid Valve:  Structurally normal tricuspid valve with normal leaflet excursion. There   is trace tricuspid regurgitation.    Inferior Vena Cava:  The inferior vena cava is normal in size (<2.1cm) with normal inspiratory   collapse (>50%) consistent with normal right atrial pressure (  3, range 0-5mmHg).    Pulmonic Valve:  Structurally normal pulmonic valve with normal leaflet excursion. There   is trace pulmonic regurgitation.    Aorta:  The aortic root is normal in size. The aortic arch is normal without   evidence of aortic coarctation. The aortic root measures 3.10 cm at level   of the sinuses of Valsalva (normal 3.1-3.7 cm for men, 2.7-3.3 cm for   women). The proximal ascending aorta measures 3.00 cm (normal 2.6-3.4 cm   for men, 2.3-3.1 cm for women).    Pericardium:  No pericardial effusion is seen.    TRANSTHORACIC ECHOCARDIOGRAM REPORT  Patient Name: ALIZA FOLEY Date of Exam: 2023  Medical Rec #: [id number] Height/Weight: 67.7 in / 288.80 lb  Account #: [id number] BSA: 2.38 m²  YOB: 1960 BP: 87/61 mmHg  Patient Age: 62 years HR: 89 bpm  Patient Gender: F Sonographer: SWAPNIL CUELLO  Fellow: Andreia Akhtar  Referring Physician: DAVID  CPT: ECHO TTE W/O CON F/U LTD - 93423; - [id number].m  Indication(s): R94.31 - Abnormal electrocardiogram ECG/EKG  Procedure: A two-dimensional transthoracic echocardiogram with color flow and Doppler was   performed in limited views only.   Study Quality: Study quality was fair.   Left Ventricle: Normal - Men Normal - Women  IVSd (2D): 1.54 cm (0.6-1.0) (0.6-0.9)  LVPWd (2D): 1.59 cm (0.6-1.0) (0.6-0.9)  Aorta/Left Atrium: Normal - Men Normal - Women  Aortic Root (2D): 3.26 cm (2.4-3.7)  LA Volume A/L:  Right Ventricle:  TR Max Velocity: RA Pressure: 3 mmHg RVSP/PASP:  Pulmonic Valve: PV Max Velocity: 0.71 m/s PV Max P mmHg PV Mean P mmHg  FINDINGS:  Left Ventricle:  There is moderate concentric left ventricular hypertrophy. The left ventricle is normal in size and systolic function with a calculated ejection fraction of 65-70%. Left ventricular endocardium is not well visualized. Grossly, left ventricular function appears normal.   Right Ventricle:  The right ventricle is normal in size. Right ventricular systolic function is normal.   Aortic Valve:  There is no evidence of aortic regurgitation.   Mitral Valve:  There is no evidence of mitral regurgitation.   Tricuspid Valve:  Structurally normal tricuspid valve with normal leaflet excursion. There is no evidence of tricuspid   regurgitation.   Pulmonic Valve:  The pulmonic valve is not well visualized. There is trace pulmonic regurgitation.   Aorta:  The aortic root is normal in size.   Pericardium:  No pericardial effusion is seen.

## 2023-09-13 NOTE — H&P ADULT - ASSESSMENT
Assessment and Plan  62F PMH AF, sciatica who had been experiencing subacute dyspnea with evaluation in [place] reportedly negative for PE and SLE who presented to [facility] with worsening dyspnea and imaging findings concerning for NSIP and labs showing +ARIANA, RNP, Alejandro Ab, U1-snRNP, low C4 (myomarker panel in lab) overall concerning for MCTD ILD. Patient’s imaging and clinical picture appeared to stabilize on steroids (solumedrol 60mg -> taper). She had also been trialed on cellcept but appeared to have adverse response. Hospital course also significant for AF RVR and episode of SVT to 180s responsive to BB but patient became hypotensive and had worsening hypoxemia necessitating HFNC->BPAP->Intubation. Post intubation still with significant hypoxemia despite high FiO2 and PEEP so placed on VV ECMO.       NEUROLOGY  #Sedation  -	Maintain sedation +/- paralytics i/s/o severe hypoxemic respiratory failure  #Weakness  -	Unclear etiology, likely myositis since pt has been having worsening weakness with movement of extremities vs Cellcept s/e. Symptoms improving subjectively.  -	follow up with Dr. Nik Marie or Dante Urbano for EMG upon discharge  #Sciatica  -	BL hand and arm numbness 2/2 to radiculopathy. Patient states that numbness in the BL UE has been increasing due to sleeping on hospital mattress. Home meds prednisone 20mg BID, gabapentin 100g TID. PT consult 8/29 needed no additional PT needs. Seen by neuro.   -	c/w gabapentin 300 mg TID.         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  #NSIP  -	VV ECMO:   -	Ventilator rest settings:     CARDIOVASCULAR  #Tachyarrhythmia  #AF RVR  -	c/w amio load  -	CT PE negative for PE  -	LE duplex negative for DVT     #Concern for Right Heart dysfunction  -	Likely i/s/o hypoxic vasoconstriction and high PEEP  -	Had been on dobutamine (+/- dig, milrinone)  -	No PE seen on invasive pulmonary angiography at time of ECMO cannulation  -	RITCHIE (9/13) showing  -	Currently improved on VV ECMO – off inotropes    RENAL  # Hyponatremia  -	f/u urine lytes    GASTROINTESTINAL  # Tube feeds    INFECTIOUS DISEASE  # Empiric antibiotics (dosed per ECMO)  -	vancomycin  -	zosyn  # PJP ppx  -	Bactrim DS     ENDOCRINE  # Hyperglycemia  -	SSI    HEME  # ECMO anticoagulation  -	argatroban gtt goal aPTT 50-70    MSK  #Critical illness polymyoneuropathy  - PT once off paralytics, coming down on sedation and begining SAT/SBTs    FLUIDS/ELECTROLYTES/NUTRITION  # Diet: NPO with TF  # IVF: albumin  # Monitor, Replete to K>4, Phos>3, Mg>2, iCa>1  # Transfuse for Hgb <7, Plt<10      PROPHYLAXIS  # DVT: argatroban  # GI: PPI    DISPO: ICU  CODE STATUS: Full   Assessment and Plan  62F [facility] AF, sciatica who had been experiencing subacute dyspnea with evaluation in [place] reportedly negative for PE and SLE who presented to [facility] with worsening dyspnea and imaging findings concerning for NSIP and labs showing +ARIANA, RNP, Alejandro Ab, U1-snRNP, low C4 (myomarker panel in lab) overall concerning for MCTD ILD. Patient’s imaging and clinical picture appeared to stabilize on steroids (solumedrol 60mg -> taper). She had also been trialed on cellcept but appeared to have adverse response. Hospital course also significant for AF RVR and episode of SVT to 180s responsive to BB but patient became hypotensive and had worsening hypoxemia necessitating HFNC->BPAP->Intubation. Post intubation still with significant hypoxemia despite high FiO2 and PEEP so placed on VV ECMO.      NEUROLOGY  #Sedation  - Maintain sedation i/s/o severe hypoxemic respiratory failure (prop, dilaudid)  - low threshold to paralyze if dysynchronus    #Weakness  -Unclear etiology, likely myositis since pt has been having worsening weakness with movement of extremities vs Cellcept s/e. Symptoms improving subjectively.  -follow up with Dr. Nik Marie or Dante Urbano for EMG upon discharge    #Sciatica  -BL hand and arm numbness ?2/2 to radiculopathy. Patient states that numbness in the BL UE has been increasing due to sleeping on hospital mattress. Home meds prednisone 20mg BID, gabapentin 100g TID. PT consult 8/29 needed no additional PT needs. Seen by neuro at [facility]      PULMONARY  #Acute on Chronic Hypoxemic Respiratory Failure  #ARDS  # VV ECMO:  - setup: R fem 25F. 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  #NSIP  #DAH   - s/p bronch with serial BAL worsening bloody appearance  - f/u bronch results  - Rheum consult  - timing of worsening appears coincident with weaning steroids  - currently being pulsed with 1g solumedrol - first dose on 9/13    #R/O Pneumonia  - Vanc, donald, azithro  - f/u BAL cultures, urine strep and legionella  - monitor QTc while on azithro    CARDIOVASCULAR  #Tachyarrhythmia  #AF RVR  -c/w amio load  -CT PE negative for PE  -LE duplex negative for DVT but will repeat  -wean down levophed    #Concern for Right Heart dysfunction  #Mild pHTN  -Likely i/s/o hypoxic vasoconstriction and high PEEP  -Had been on milrinone PTA now off  -No PE seen on invasive pulmonary angiography at time of ECMO cannulation  -Currently improved on VV ECMO – off inotropes  -wean off NO2 as above  -f/u official echo    #Distributive Shock  -likely septic with component of vaspoplegia i/s/o sedation, possible contribution of cardiogenic from RV dysfunction  -c/w levo, marianne, vaso - increase marianne to come down on levo    RENAL  # Hyponatremia  -f/u urine lytes  -overall making good urine    GASTROINTESTINAL  # Nutrition  - start tube feeds tomorrow    #Transaminase elevation  - likely due to shock liver  - if not improving can consider pulling back pull cannula    INFECTIOUS DISEASE  # Empiric antibiotics (dosed per ECMO)  - vancomycin  - donald  - azithro    # PJP ppx  -Bactrim DS     ENDOCRINE  # Hyperglycemia i/s/o steroids  -SSI  -Admission A1c 6.1      HEME  # ECMO anticoagulation  -argatroban gtt goal aPTT 50-70    #R/O DVT  - f/u duplex    MSK  #Critical illness polymyoneuropathy  - PT once off paralytics, coming down on sedation and begining SAT/SBTs    FLUIDS/ELECTROLYTES/NUTRITION  # Diet: NPO with TF  # IVF: albumin PRN  # Monitor, Replete to K>4, Phos>3, Mg>2, iCa>1  # Transfuse for Hgb <7, Plt<10      PROPHYLAXIS  # DVT: argatroban  # GI: TF    DISPO: ICU  CODE STATUS: Full

## 2023-09-13 NOTE — PROGRESS NOTE ADULT - PROBLEM SELECTOR PLAN 2
STABLE.   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 Hb and Hct elevated in setting of chronic sob. CXR revealed ground glass opacity. CTA revealed interstitial lung disease.  Infectious workup negative so far. Hypersensitivity pneumonitis panel wnl. stable. Ambulatory sats have been stable around high 80s/ low 90s with quick recovery to baseline. s/p solu-medrol 60mg course & tapering, now transitioned to PO Methylprednisolone 32mg and started on Cellecept 9/9 but pt reported subjective side effects. RVP negative. Desatted to 80s on NC 6L, placed in NRB due to refusal of BiPAP/HFNC; curently satting ~90%. Discontinued Cellcept. Patient placed on HFNC which was transitioned to BiPAP for increased oxygen demand. Patient found to be tachypneic, in increased wob, and satting in the 70s despite maximal NIV therapy. Patient transferred to MICU for intubation and mechanical ventilation.     - Declined cards consult this AM  - Pulm recommendations: 10 mg Lasix IV, urinating well; check I&O  - High flow when not on BiPap: on intermittent BiPap every hour 1 every 4 hours from 6 am to 10 PM  - 25% albumin standing order q8  - Continue to monitor sxs and obtain ambulatory sats.  - Wean O2 as tolerated     #likely LEILA  Indeterminate ANCA, ARIANA (1:1280 speckled), anti-smith (positive), anti-dsDNA (neg), complement levels (C3 normal, C4 low), RF (neg), sjogrens (neg), centromere abs (neg), RF (neg), anti-CCP (neg), anti RNP (elevated; >8), myomarker panel (pending), and systemic sclerosis (neg)

## 2023-09-13 NOTE — PROVIDER CONTACT NOTE (OTHER) - DATE AND TIME:
10-Sep-2023 08:15
13-Sep-2023 00:00
12-Sep-2023 23:56
13-Sep-2023 04:30
12-Sep-2023 20:30
13-Sep-2023 02:11

## 2023-09-13 NOTE — CHART NOTE - NSCHARTNOTEFT_GEN_A_CORE
Procedure: Transesophageal Echocardiogram  Indication:  shock / hypoxemia  Consent:  Documented in chart.   Operators:  Yvonne / Michi   Anesthesia:  Fentanyl drip; propofol drip; nimbex  Technique:  Glide scope insertion.     Findings:     AV:  Trace AI.     MV:  Mild MR.    PV:  Normal.      TV:  Normal.    LV:  Normal LVSF without SWMA.     RV: No RV dilation.     LA:   Normal.    RA:  Normal.      SVC / IVC:  ECMO return cannula noted in the distal SVC. ECMO drainage cannula in the RA approximately 2 cm from the return cannula.     Aorta:  Normal.     PA:  Normal.     Doppler: LVOT VTI of 14 cm however suboptimal doppler signal. MV E 0.67 m/s. MV A absent in setting of afib. Lateral MV e' of 0.12 m/s. MV E/e' of 5.68.       Other:  Agitated saline contrast study negative for PFO. LVOT diameter of 2 cm. Consolidated lung noted bilaterally with associated interstitial pattern.     Assessment    Normal LVSF.  LVOT diameter of 2 cm.   LVOT VTI mildly reduced however suboptimal doppler signal.  No PFO.   ECMO cannulas approximately 2 cm apart. Monitor for evidence of recirculation.     Images uploaded to [employer] Procedure: Transesophageal Echocardiogram  Indication:  shock / hypoxemia  Consent:  Documented in chart.   Operators:  Yvonne / Michi   Anesthesia:  Fentanyl drip; propofol drip; nimbex  Technique:  Glide scope insertion.     Findings:     AV:  Trace AI.     MV:  Mild MR.    PV:  Normal.      TV:  Normal.    LV:  Normal LVSF without SWMA.     RV: No RV dilation. TAPSE mildly reduced however off axis.     LA:   Normal.    RA:  Normal.      SVC / IVC:  ECMO return cannula noted in the distal SVC. ECMO drainage cannula in the RA approximately 2 cm from the return cannula.     Aorta:  Normal.     PA:  Normal.     Doppler: LVOT VTI of 14 cm however suboptimal doppler signal. MV E 0.67 m/s. MV A absent in setting of afib. Lateral MV e' of 0.12 m/s. MV E/e' of 5.68. MEREDITH velocity of 57 cm/s.     Other:  Agitated saline contrast study negative for PFO. LVOT diameter of 2 cm. Consolidated lung noted bilaterally with associated interstitial pattern.     Assessment    Normal LVSF.  LVOT diameter of 2 cm.   LVOT VTI mildly reduced however suboptimal doppler signal.  No PFO.   ECMO cannulas approximately 2 cm apart. Monitor for evidence of recirculation.     Images uploaded to [employer] Procedure: Transesophageal Echocardiogram  Indication:  shock / hypoxemia  Consent:  Documented in chart.   Operators:  Yvonne / Michi   Anesthesia:  Fentanyl drip; propofol drip; nimbex  Technique:  Glide scope insertion.     Findings:     AV:  Trace AI.     MV:  Mild MR.    PV:  Normal.      TV:  Normal.    LV:  Normal LVSF without SWMA.     RV: Mild RV dilation. RV systolic function reduced.     LA:   Normal.    RA:  Normal.      SVC / IVC:  ECMO return cannula noted in the distal SVC. ECMO drainage cannula in the RA approximately 2 cm from the return cannula.     Aorta:  Normal.     PA:  Normal.     Doppler: LVOT VTI of 14 cm however suboptimal doppler signal. MV E 0.67 m/s. MV A absent in setting of afib. Lateral MV e' of 0.12 m/s. MV E/e' of 5.68. MEREDITH velocity of 57 cm/s.     Other:  Agitated saline contrast study negative for PFO. LVOT diameter of 2 cm. Consolidated lung noted bilaterally with associated interstitial pattern.     Assessment    Normal LVSF.  LVOT diameter of 2 cm.   LVOT VTI mildly reduced however suboptimal doppler signal.  No PFO.   ECMO cannulas approximately 2 cm apart. Monitor for evidence of recirculation.     Images uploaded to [employer]    Attending Attestation:  I was present during the key portions of the procedure and immediately available during the entire procedure.  Jozef Borden MD  Attending  Pulmonary & Critical Care Medicine Procedure: Transesophageal Echocardiogram  Indication:  shock / hypoxemia  Consent:  Documented in chart.   Operators:  Yvonne / Michi   Anesthesia:  Fentanyl drip; propofol drip; nimbex  Technique:  Glide scope insertion.     Findings:     AV:  Trace AI.     MV:  Mild MR.    PV:  Normal.      TV:  Normal.    LV:  Normal LVSF without SWMA.     RV: Mild RV dilation. RV systolic function reduced.     LA:   Normal. No thrombus noted in MEREDITH.    RA:  Normal.      SVC / IVC:  ECMO return cannula noted in the distal SVC. ECMO drainage cannula in the RA approximately 2 cm from the return cannula.     Aorta:  Normal.     PA:  Normal.     Doppler: LVOT VTI of 14 cm however suboptimal doppler signal. MV E 0.67 m/s. MV A absent in setting of afib. Lateral MV e' of 0.12 m/s. MV E/e' of 5.68. MEREDITH velocity of 57 cm/s.     Other:  Agitated saline contrast study negative for PFO. LVOT diameter of 2 cm. Consolidated lung noted bilaterally with associated interstitial pattern.     Assessment    Normal LVSF.  LVOT diameter of 2 cm.   LVOT VTI mildly reduced however suboptimal doppler signal.  No PFO.   ECMO cannulas approximately 2 cm apart. Monitor for evidence of recirculation.     Images uploaded to [employer]    Attending Attestation:  I was present during the key portions of the procedure and immediately available during the entire procedure.  Jozef Borden MD  Attending  Pulmonary & Critical Care Medicine

## 2023-09-13 NOTE — PROCEDURE NOTE - NSPROCNAME_GEN_A_CORE
Point of Care Ultrasound Cardiac
Point of Care Ultrasound Lung
Peripheral Line Insertion
Arterial Puncture/Cannulation
Urinary Device Placement
Central Line Insertion
Peripheral Line Insertion

## 2023-09-13 NOTE — PROGRESS NOTE ADULT - SUBJECTIVE AND OBJECTIVE BOX
***Note in progress***    OVERNIGHT EVENTS: NAEO    SUBJECTIVE / INTERVAL HPI: Patient seen and examined at bedside. Patient denying chest pain, SOB, palpitations, cough. Patient denies fever, chills, HA, Dizziness, N/V, abdominal pain, diarrhea, constipation, hematochezia/melena, dysuria, hematuria, new onset weakness/numbness, LE pain and/or swelling.    Remaining ROS negative       PHYSICAL EXAM:    General:NAD.   HEENT: NC/AT; PERRL, anicteric sclera; MMM  Neck: supple  Cardiovascular: +S1/S2, RRR  Respiratory: CTA B/L; no W/R/R  Gastrointestinal: soft, NT/ND; +BSx4  Extremities: WWP; no edema, clubbing or cyanosis  Vascular: 2+ radial, DP/PT pulses B/L  Neurological: AAOx3; no focal deficits  Psychiatric: pleasant mood and affect  Dermatologic: no appreciable wounds or damage to the skin    VITAL SIGNS:  Vital Signs Last 24 Hrs  T(C): 36.9 (13 Sep 2023 05:00), Max: 37.2 (12 Sep 2023 09:18)  T(F): 98.4 (13 Sep 2023 05:00), Max: 98.9 (12 Sep 2023 09:18)  HR: 154 (13 Sep 2023 06:57) (77 - 155)  BP: 128/67 (13 Sep 2023 05:30) (101/59 - 137/89)  BP(mean): 85 (13 Sep 2023 05:30) (75 - 106)  RR: 42 (13 Sep 2023 05:30) (18 - 45)  SpO2: 61% (13 Sep 2023 06:57) (61% - 100%)    Parameters below as of 13 Sep 2023 05:30  Patient On (Oxygen Delivery Method): BiPAP/CPAP    O2 Concentration (%): 100      MEDICATIONS:  MEDICATIONS  (STANDING):  albumin human 25% IVPB 50 milliLiter(s) IV Intermittent every 8 hours  chlorhexidine 4% Liquid 1 Application(s) Topical <User Schedule>  cisatracurium Infusion 3 MICROgram(s)/kG/Min (23.6 mL/Hr) IV Continuous <Continuous>  cisatracurium Injectable 10 milliGRAM(s) IV Push once  cisatracurium Injectable 10 milliGRAM(s) IV Push once  dextrose 5%. 1000 milliLiter(s) (50 mL/Hr) IV Continuous <Continuous>  dextrose 5%. 1000 milliLiter(s) (100 mL/Hr) IV Continuous <Continuous>  dextrose 50% Injectable 25 Gram(s) IV Push once  dextrose 50% Injectable 12.5 Gram(s) IV Push once  dextrose 50% Injectable 25 Gram(s) IV Push once  enoxaparin Injectable 40 milliGRAM(s) SubCutaneous every 12 hours  fentaNYL   Infusion... 0.5 MICROgram(s)/kG/Hr (3.28 mL/Hr) IV Continuous <Continuous>  gabapentin 300 milliGRAM(s) Oral every 8 hours  glucagon  Injectable 1 milliGRAM(s) IntraMuscular once  insulin lispro (ADMELOG) corrective regimen sliding scale   SubCutaneous Before meals and at bedtime  lidocaine   4% Patch 1 Patch Transdermal every 24 hours  metFORMIN 500 milliGRAM(s) Oral two times a day  methylPREDNISolone 40 milliGRAM(s) Oral daily  metoprolol tartrate 12.5 milliGRAM(s) Oral every 8 hours  midazolam Injectable 4 milliGRAM(s) IV Push once  norepinephrine Infusion 0.05 MICROgram(s)/kG/Min (12.3 mL/Hr) IV Continuous <Continuous>  pantoprazole    Tablet 40 milliGRAM(s) Oral before breakfast  piperacillin/tazobactam IVPB.. 4.5 Gram(s) IV Intermittent every 8 hours  polyethylene glycol 3350 17 Gram(s) Oral daily  propofol Infusion 10 MICROgram(s)/kG/Min (7.87 mL/Hr) IV Continuous <Continuous>  trimethoprim  160 mG/sulfamethoxazole 800 mG 1 Tablet(s) Oral daily  vancomycin  IVPB 1500 milliGRAM(s) IV Intermittent every 12 hours  vasopressin Infusion 0.04 Unit(s)/Min (6 mL/Hr) IV Continuous <Continuous>    MEDICATIONS  (PRN):  acetaminophen     Tablet .. 650 milliGRAM(s) Oral every 6 hours PRN Temp greater or equal to 38C (100.4F), Mild Pain (1 - 3)  benzocaine/menthol Lozenge 1 Lozenge Oral every 4 hours PRN Sore Throat  dextrose Oral Gel 15 Gram(s) Oral once PRN Blood Glucose LESS THAN 70 milliGRAM(s)/deciliter      ALLERGIES:  Allergies    No Known Allergies    Intolerances        LABS:                        15.9   15.40 )-----------( 112      ( 12 Sep 2023 06:21 )             50.0     09-12    133<L>  |  104  |  35<H>  ----------------------------<  133<H>  5.2   |  17<L>  |  0.84    Ca    8.1<L>      12 Sep 2023 06:21  Phos  3.9     09-12  Mg     2.0     09-12    TPro  5.0<L>  /  Alb  2.3<L>  /  TBili  1.0  /  DBili  x   /  AST  54<H>  /  ALT  102<H>  /  AlkPhos  108  09-12      Urinalysis Basic - ( 12 Sep 2023 06:21 )    Color: x / Appearance: x / SG: x / pH: x  Gluc: 133 mg/dL / Ketone: x  / Bili: x / Urobili: x   Blood: x / Protein: x / Nitrite: x   Leuk Esterase: x / RBC: x / WBC x   Sq Epi: x / Non Sq Epi: x / Bacteria: x      CAPILLARY BLOOD GLUCOSE      POCT Blood Glucose.: 145 mg/dL (12 Sep 2023 21:29)      RADIOLOGY & ADDITIONAL TESTS: Reviewed.

## 2023-09-13 NOTE — H&P ADULT - ATTENDING COMMENTS
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    Patient was intially admitted to [facility] on 8/26 after p/w SOB and found to be hypoxemic. Of note, as per chart review and wife, patient has had SOB  for several months. They stayed in [place] from 11/2022 to 8/20/2023 and while there, patient noted onset of chronic SOB several months ago. Also endorse onset of debilitating b/l hand cramps and some muscles weakness several months as well. Patient went to see a neurologist and had an MRI which reportedly showed cervical spine issues for which she was recently started on Prednisone shortly before admission. Also endorses single episode of painful rash on her shins, ears and neck and chest which resolved with a steroid cream from a dermatologist.     During admission at [facility], patient underwent bronchoscopy with TBBX on 8/30 which showed NSIP/OP. Labs notable for positive ARIANA 1:1280, RNP > 8, Alejandro > 8. Patient was started on Solumedrol 60mg q6h from 9/1 to 9/6 then weaned over time to then Medrol 32 mg 9/9 to 9/12. Patient was also on Cellcept 9/8 to 9/11 but was stopped due to tachycardia. During this time, patient was on 2 to 4 L nc. Interval CTA chest on 9/11 also showed improved in reticular findings and diffuse GGO. On 9/12 early AM, patient had episode of SVT to 170s with then rapidly progressive hypoxemic respiratory failure leading to intubation this AM 9/13. Patient was restarted on empiric abx with Vanc and Zosyn on 9/12. Despite best practices, patient remamined hypoxemic and patient was cannulated for VV-ECMO and transferred to [facility].    #Shock - Likely mixed with newly decreased RV systolic function and RV dilation and distributive from sepsis  #Septic Shock  #RV dysfunction  #Shock Liver  #ARDS  #Multifocal Pneumonia  #MCTD-ILD  #Acute hypoxemic and hypercapnic respiratory failure - Differential includes bacterial PNA, atypical PNA, aspiration, AEILD. Angiogram during cannulation today negative for PE  #VV-ECMO  #Afib w/ RVR  #Oropharyngeal dysphagia  - c/w vasopressors to maintain MAP > 65. Held milrinone shortly after arrival. Will monitor RV size and function with serial TTE/RITCHIE  - c/w amiodarone gtt, may need alternative control agent given ILD and transaminitis  - c/w sedation, currently on Dilaudid and Propofol. Paralytics if unable to keep concordant with vent  - c/w mechanical ventilatory support, currently on rest settings PC RR 12, PS 12, PEEP 12, 100%  - c/w VV-ECMO support, 25 Fr RIJ and 19Fr RFem. Pull cannula is within RA with tip near distal SVC. Will monitor for signs of recirculation. Thoracic to pull back tomorrow unless clinically detrimental overnight. Currently on Sweep 4, Flow 3.8 to 4.  - f/u BAL cyto, cell count, cultures, PCP PCR, fungitell, aspergillus  - f/u blood and urine cultures, MRSA PCR, urine legionella, RVP  - Will broaden empiric abx to Vanc, Wilmer and Azithro  - Will consult rheum given concern for DAH, need for continuing pulse dose steroids (received dose this AM) and steroid sparing agent  - Trend LFTs for shock liver  - c/w Argatroban gtt, tranfuse as needed. Trend LDH and haptoglobin  - CTH and CT chest when stable  - Official LE duplex  - Offical TTE    #GOC - Full Code    #DVT ppx - Argatroban gtt    #POCUS - See POCUS note    Total time spent on VV ECMO management was 30 minutes, separate from time spent on critical care management, procedures, and teaching.    Jozef Borden MD  Pulmonary & Critical Care

## 2023-09-13 NOTE — PROVIDER CONTACT NOTE (OTHER) - ASSESSMENT
Patient presents with pallor and sweating.
alert oriented x4
pt SOB, tachypneic, and remains tachycardic.   , /65, MAP 84, RR 45, Sa)2 75 on HFNC 50/70.   [facility]
pt laying in bed. pt SOB with RR of 42 and HR of 155. pt denies cp. Pt /65, MAP 84. pt satting @ 92% on HFNC 50/70.   pt RR went back to 18-24 after a few minutes but remains tachycardic w/ -155.
HR 79, /59, MAP 75, RR 40, SaO2 96%. pt denies cp and SOB.
pt anxious, , /89, , RR 45, Sa)2 83% on BIPAP fio2 100%.

## 2023-09-13 NOTE — PROVIDER CONTACT NOTE (EICU) - ASSESSMENT
Patient critically ill with ARDS in setting of ILD. Accepted to [facility] ECMO to Dr. Jozef Borden. Patient to be cannulated at [facility] and then to be transferred over to [facility]. [facility] and [facility] to touch base after cannulation.

## 2023-09-13 NOTE — PROCEDURE NOTE - NSINDICATIONS_GEN_A_CORE
emergency venous access
critical illness
critical patient/strict intake/output during critical illness
needs IV access for meds

## 2023-09-13 NOTE — PROCEDURE NOTE - NSPOSTCAREGUIDE_GEN_A_CORE
Verbal/written post procedure instructions were given to patient/caregiver/Instructed patient/caregiver to follow-up with primary care physician/Instructed patient/caregiver regarding signs and symptoms of infection
Verbal/written post procedure instructions were given to patient/caregiver/Instructed patient/caregiver to follow-up with primary care physician/Instructed patient/caregiver regarding signs and symptoms of infection/Keep the cast/splint/dressing clean and dry/Care for catheter as per unit/ICU protocols
Verbal/written post procedure instructions were given to patient/caregiver/Instructed patient/caregiver to follow-up with primary care physician/Instructed patient/caregiver regarding signs and symptoms of infection/Keep the cast/splint/dressing clean and dry/Care for catheter as per unit/ICU protocols
Care for catheter as per unit/ICU protocols
Verbal/written post procedure instructions were given to patient/caregiver/Instructed patient/caregiver to follow-up with primary care physician/Instructed patient/caregiver regarding signs and symptoms of infection/Keep the cast/splint/dressing clean and dry/Care for catheter as per unit/ICU protocols

## 2023-09-13 NOTE — AIRWAY PLACEMENT NOTE ADULT - UNABLE TO INTUBATE ANESTHESIA/PHYSICIAN CALLED:
Anesthesia called to bedside stat intubation. 61 yo F severe ILD on bipap 100% O2sats 75% acute respiratory distress. Uneventful, atraumatic intubation, see details above.  Pre intubation VS  bpm, /60, O2sats 70%, Post intubation, -150, BP supported with levophed infusion 130/80, O2sats 65%,+BBS on 100% FIO2  RR 28, PEEP 12 chest xray obtained. Transfer of care to Neuro ICU team

## 2023-09-13 NOTE — PROGRESS NOTE ADULT - ASSESSMENT
62 year old female with a past medical history of afib, and sciatica, who presented to [facility] for shortness of breath found to be hypoxic and have interstitial lung disease, admitted for AHRF, further ILD workup and management; course c/b tachyarrhytmia from AF w RVR and SVT, now transferred to telemetry.

## 2023-09-13 NOTE — PROVIDER CONTACT NOTE (OTHER) - ACTION/TREATMENT ORDERED:
pt to be placed back onto BIPAP. pt self converted to SR.
pt to be put onto high-flow to see if she tolerates it better.
Provider present to examine patient at bedside; patient placed on 4L NC via provider's orders.
continue to monitor pt. 12 lead ecg to be done. team will come see pt if HR doesn't decrease over time.
MD PINO in attendance
let pt relax for a few minutes and see if Sa02 increases. team will discuss if pt can get another push of ativan.

## 2023-09-13 NOTE — H&P ADULT - NSHPLABSRESULTS_GEN_ALL_CORE
.                        11.5   7.42  )-----------( 81       ( 13 Sep 2023 15:44 )             34.7     -    134<L>  |  101  |  29<H>  ----------------------------<  184<H>  3.7   |  21<L>  |  1.02    Ca    9.1      13 Sep 2023 11:57  Phos  3.5       Mg     2.20         TPro  4.5<L>  /  Alb  2.6<L>  /  TBili  2.7<H>  /  DBili  2.2<H>  /  AST  695<H>  /  ALT  681<H>  /  AlkPhos  94      PT/INR - ( 13 Sep 2023 15:44 )   PT: 27.2 sec;   INR: 2.47 ratio         PTT - ( 13 Sep 2023 17:41 )  PTT:59.1 sec      Urinalysis Basic - ( 13 Sep 2023 15:44 )    Color: Yellow / Appearance: Clear / S.010 / pH: x  Gluc: x / Ketone: Negative mg/dL  / Bili: Negative / Urobili: 0.2 mg/dL   Blood: x / Protein: Negative mg/dL / Nitrite: Negative   Leuk Esterase: Negative / RBC: 1 /HPF / WBC 0 /HPF   Sq Epi: x / Non Sq Epi: 0 /HPF / Bacteria: Negative /HPF        Culture - Blood (collected 12 Sep 2023 03:43)  Source: .Blood Blood  Preliminary Report (13 Sep 2023 06:00):    No growth at 1 day.    Culture - Blood (collected 12 Sep 2023 03:43)  Source: .Blood Blood  Preliminary Report (13 Sep 2023 06:00):    No growth at 1 day.    Urinalysis with Rflx Culture (collected 12 Sep 2023 02:56)        ABG - ( 13 Sep 2023 15:44 )  pH, Arterial: 7.31  pH, Blood: x     /  pCO2: 43    /  pO2: 78    / HCO3: 22    / Base Excess: -4.4  /  SaO2: 96.2              CAPILLARY BLOOD GLUCOSE      POCT Blood Glucose.: 145 mg/dL (12 Sep 2023 21:29)      RADIOLOGY, EKG & ADDITIONAL TESTS: Reviewed.     ECMO Day# 1    Adult Advanced Hemodynamics Last 24 Hrs  CVP(mm Hg): --  CVP(cm H2O): --  CO: -- 5.5  CI: -- 2.36  PA: --  PA(mean): --  PCWP: --  SVR: --  SVRI: --  PVR: --  PVRI: --    ECMO SETTINGS:  Type:		[x] Venovenous		[ ] Venoarterial  Cannulation Site(s):  R Fem, RIJ    Flow:  3.8 L/m	        RPM: 3100		    Oxygenator:	Sweep:   4  L/min	FiO2:    100

## 2023-09-13 NOTE — PROGRESS NOTE ADULT - PROBLEM SELECTOR PLAN 3
Unclear etiology, likely myositis since pt has been having worsening weakness with movement of extremities vs Cellcept s/e. Symptoms improving subjectively.     - neuro consulted, now signed off  - follow up with Dr. Nik Marie or Dante Urbano for EMG upon discharge. Contact: [phone]

## 2023-09-13 NOTE — PROGRESS NOTE ADULT - PROBLEM SELECTOR PROBLEM 6
Afib
Prophylactic measure
Prophylactic measure
Afib
Prophylactic measure
Prophylactic measure
Afib
Prophylactic measure
Afib
Prophylactic measure
Afib
Prophylactic measure
Afib

## 2023-09-13 NOTE — PROGRESS NOTE ADULT - REASON FOR ADMISSION

## 2023-09-13 NOTE — PROVIDER CONTACT NOTE (OTHER) - BACKGROUND
pt admitted for SOB and hypoxia, pt stepped up 9/11 for tachycardia and hypotension
Patient admitted for hypoxia.
admitted for hypoxia.with hx of rapid afib
pt admitted for SOB and hypoxia, pt stepped up 9/11 for tachycardia and hyotension
pt admitted for SOB and hypoxia. pt stepped up 9/11 for tachycardia, hypotension, and increased O2 requirements. Throughout day pt on BIPAP for 1hr every 4 hours and high flow the rest of the time.
pt admitted for SOB and hypoxia. pt stepped up on 9/11 for tachycardia and hypotension.
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
## 2023-09-13 NOTE — PROGRESS NOTE ADULT - PROBLEM SELECTOR PROBLEM 5
Transaminitis
Transaminitis
Hyponatremia
Transaminitis
Hyponatremia
Transaminitis
Hyponatremia
Hyponatremia
Transaminitis
Weakness
Hyponatremia
Hyponatremia
Transaminitis
Hyponatremia
Transaminitis

## 2023-09-13 NOTE — PROGRESS NOTE ADULT - SUBJECTIVE AND OBJECTIVE BOX
-------------TRANSFER FROM [facility] to MICU-------------------  62 year old female with a past medical history of afib, and sciatica and connective tissue disease, who presented to [facility] for shortness of breath found to be hypoxic and have interstitial lung disease, admitted 8/26 for AHRF, further ILD workup and management. TTE 8/26 with normal LVEF. Pt was started on predinison on admission with gradually improving O2 requirement and has been stable on 2-3LNC since 9/3. Started steroid taper on 9/6 and fully transitioned to PO 9/8 overnight. Started on Mycophenolate mofetil (cellcept) 9/8 evening but pt reports subjective side effects with weakness. Episode of AF w RVR with HR up to 140s on 9/11 am, decreased to HR 10-110s with IV lopressor 10. CTA negative for PE but showed possible lingula pneumonia. ICU and cards consulted, recommending fluids and oral BB. Patient received 2L of fluids 9/12 pm with EKG showing NSR HR 90s, SBP . Before receiving oral tartrate 12.5 and an additional 1L bolus, pt developed heart palpitations, no CP/SOB. EKG HR 170s with SVT. BPs 105/70s. Did not respond to vagal maneuvers. Cards and ICU called. Bedside echo with preserved LVEF and small pericardial effusion. Pt given oral lopressor 12.5 and IV lopressor 5, with improvement of HR to 130s. SBP briefly dropped to 60-70s then recovered. Pt feeling warm with palpitations but otherwise asx. Started on additional 1L NS and transferred to . Patient on NRB offered HFNC/BiPAP but refused. Started on empiric vancomycin and zosyn. BCx w/ NGTD. Per pulm increased solumedrol to 40mg qd. Patient acceded to HFNC in which she desatted and presented with increased wob for which she was transitioned to BiPAP. Patient with anxiety while on BiPAP presenting tachypnea and desatting to the 80s despite verbal redirection for which she was administered ativan 1mg IVP x1. Patient distress improved with sats in the low 90s. Subsequently patient started presenting with tachypnea, increased work of breathing and desaturation to 70s. STAT CXR ordered and read by intensivist showed increased pulmonary congestion for which patient was administered lasix 20mg IVP x1. Due to concerns of respiratory failure despite maximal NIV therapy patient was transferred to MICU for intubation and mechanical ventilation.   [facility]  SUBJECTIVE:  Patient seen and examined at bedside. Found to be in respiratory distress while on BiPAP.     Vital Signs Last 12 Hrs  T(F): 98.4 (09-13-23 @ 05:00), Max: 98.4 (09-12-23 @ 21:39)  HR: 124 (09-13-23 @ 05:30) (77 - 155)  BP: 128/67 (09-13-23 @ 05:30) (101/59 - 137/89)  BP(mean): 85 (09-13-23 @ 05:30) (75 - 106)  RR: 42 (09-13-23 @ 05:30) (18 - 45)  SpO2: 79% (09-13-23 @ 05:30) (75% - 100%)  I&O's Summary    11 Sep 2023 07:01  -  12 Sep 2023 07:00  --------------------------------------------------------  IN: 0 mL / OUT: 450 mL / NET: -450 mL    12 Sep 2023 07:01  -  13 Sep 2023 06:20  --------------------------------------------------------  IN: 810 mL / OUT: 950 mL / NET: -140 mL        PHYSICAL EXAM:  General: in respiratory distress while on BiPAP  HEENT: NC/AT; PERRL, anicteric sclera; MMM  Neck: supple  Cardiovascular: +S1/S2, RRR  Respiratory: bilateral course breath sounds, tachypneic, increased wob  Gastrointestinal: soft, NT/ND; +BSx4  Extremities: WWP; +pitting edema bilaterally, no clubbing or cyanosis  Vascular: 2+ radial, DP/PT pulses B/L  Neurological: AAOx3; no focal deficits  Psychiatric: pleasant mood and affect  Dermatologic: no appreciable wounds or damage to the skin      LABS:                        15.9   15.40 )-----------( 112      ( 12 Sep 2023 06:21 )             50.0     09-12    133<L>  |  104  |  35<H>  ----------------------------<  133<H>  5.2   |  17<L>  |  0.84    Ca    8.1<L>      12 Sep 2023 06:21  Phos  3.9     09-12  Mg     2.0     09-12    TPro  5.0<L>  /  Alb  2.3<L>  /  TBili  1.0  /  DBili  x   /  AST  54<H>  /  ALT  102<H>  /  AlkPhos  108  09-12      Urinalysis Basic - ( 12 Sep 2023 06:21 )    Color: x / Appearance: x / SG: x / pH: x  Gluc: 133 mg/dL / Ketone: x  / Bili: x / Urobili: x   Blood: x / Protein: x / Nitrite: x   Leuk Esterase: x / RBC: x / WBC x   Sq Epi: x / Non Sq Epi: x / Bacteria: x          RADIOLOGY & ADDITIONAL TESTS:    MEDICATIONS  (STANDING):  albumin human 25% IVPB 50 milliLiter(s) IV Intermittent every 8 hours  cisatracurium Injectable 10 milliGRAM(s) IV Push once  dextrose 5%. 1000 milliLiter(s) (50 mL/Hr) IV Continuous <Continuous>  dextrose 5%. 1000 milliLiter(s) (100 mL/Hr) IV Continuous <Continuous>  dextrose 50% Injectable 12.5 Gram(s) IV Push once  dextrose 50% Injectable 25 Gram(s) IV Push once  dextrose 50% Injectable 25 Gram(s) IV Push once  enoxaparin Injectable 40 milliGRAM(s) SubCutaneous every 12 hours  gabapentin 300 milliGRAM(s) Oral every 8 hours  glucagon  Injectable 1 milliGRAM(s) IntraMuscular once  insulin lispro (ADMELOG) corrective regimen sliding scale   SubCutaneous Before meals and at bedtime  lidocaine   4% Patch 1 Patch Transdermal every 24 hours  metFORMIN 500 milliGRAM(s) Oral two times a day  methylPREDNISolone 40 milliGRAM(s) Oral daily  metoprolol tartrate 12.5 milliGRAM(s) Oral every 8 hours  midazolam Injectable 4 milliGRAM(s) IV Push once  pantoprazole    Tablet 40 milliGRAM(s) Oral before breakfast  piperacillin/tazobactam IVPB.. 4.5 Gram(s) IV Intermittent every 8 hours  polyethylene glycol 3350 17 Gram(s) Oral daily  propofol Infusion 10 MICROgram(s)/kG/Min (7.87 mL/Hr) IV Continuous <Continuous>  trimethoprim  160 mG/sulfamethoxazole 800 mG 1 Tablet(s) Oral daily  vancomycin  IVPB 1500 milliGRAM(s) IV Intermittent every 12 hours    MEDICATIONS  (PRN):  acetaminophen     Tablet .. 650 milliGRAM(s) Oral every 6 hours PRN Temp greater or equal to 38C (100.4F), Mild Pain (1 - 3)  benzocaine/menthol Lozenge 1 Lozenge Oral every 4 hours PRN Sore Throat  dextrose Oral Gel 15 Gram(s) Oral once PRN Blood Glucose LESS THAN 70 milliGRAM(s)/deciliter

## 2023-09-13 NOTE — H&P ADULT - NSTOBACCOSCREENHP_GEN_A_NCS
Speech Therapy      Visit Type: Treatment  -  Swallow    Precautions     - Medical precautions:  ISO for COVID; aspiration precautions.    - Oxygen: nasal cannula (2L).      - Lines in chart and on patient reviewed; cautions maintained through out session    - Safety measures: bed rails and bed alarm    - Hearing: no hearing deficits    - Cognition:         • Expression is verbal.          • Following commands intact.      - Affect/Behavior: alert, appropriate, calm, cooperative and pleasant    Current Diet: nectar-thick liquids and full liquid      - Dentition: intact    SUBJECTIVE                                                                                                             70 YOM w/ history of multiple myeloma s/p bone marrow transplant, HTN, arthritis, and chronic adrenal insufficiency who initially presented to the ER on 11/3/2020 for exertional dyspnea, RLE swelling and pain. Patient was supposed to have preoperative clearance w/ his PCP on 11/2/2020 for cataract surgery and was recommended to go to the ER instead due to his symptoms. On arrival at the ER, patient was hypertensive and afebrile. CTA of the chest showed nonobstructive emboli noted in the main pulmonary artery, lobar, segmental, and subsegmental branches of the middle and bilateral lower lobes. NADER showed RV strain. Patient then underwent placement of pulmonary artery thrombolysis catheters and had a venous doppler of ultrasound of the lower extremity showing extensive LLE DVT. He also underwent bilateral pulmonary angiography, IVC filter placement, and attempted thrombectomy from the LLE on 11/4/2020. After the procedure, he then became short of breath and placed on BiPAP. Patient became hypotensive. He was sent over to hemorrhagic shock. Patient was placed on pressors and received transfusion of packed red blood cells. He was intubated and subsequently had a LLE venogram and venoplasty w/ stent placement in the L common iliac vein.  Patient then went into cardiac arrest and received 1 round of CPR with 1 mg of epinephrine. Nephrology was also placed on consult due to worsening renal function. He was started on hemodialysis treatment. Patient developed new onset atrial fibrillation w/RVR on 11/7/2020. His hospitalization was complicated by having several episodes of melena. Patient was extubated on 11/10/2020 and was placed on high flow nasal cannula. EGD performed on 11/11/2020 showed erosive duodenitis. Speech tx consulted for clinical swallow eval. Pt reportedly w/no hx of dysphagia, tolerating a general diet/thin liquids PTA. Swallow eval completed 11/18 w/recs for a [facility] soft diet and nectar thick liquids +s/s of aspiration w/thin liquids (see note for further details). ST f/u 11/19 and advanced to dysphagia III/soft diet and nectar thick liquids. Ongoing swallow analyses completed w/ST, last seen 11/23 w/recs to continue modified diet and follow up videoswallow eval to further objectively assess pts swallow response due to s/s of aspiration w/thin liquids. VFSE completed 11/24 which revealed moderate-severe oropharyngeal dysphagia w/aspiration of thin liquids and penetration of nectar thick liquids +increased pharyngeal residue. Recs made for full liquid /nectar thick liquid diet +STRICT swallow precautions (see note for further details).    Ongoing swallow analyses completed. 12/1: right CFA PSA in the setting of hemoglobin drop and mild hypotension. Pt clinically had a right groin PSA. Urgent CTA showed a right distal CFA PSA but also significant left thigh hematoma. The pt was moved to the [facility] for critical care management and a monitored bed. An IJ central line was placed for resuscitation. The case was discussed with Dr. Cisse from IR who was able to thrombin inject the PSA and perform a LLE angiogram that did not demonstrate any extravasation from an SFA or profunda branch into the thigh hematoma.    Okay to see pt per RN. Received  pt at the b/s awake and alert. RN at the b/s who placed NGT for flushes and supplemental feeds. Pts sodium is high and is not taking in enough fluids orally due to dysphagia. Worsening dysphonia noted upon arrival which improved throughout session. Afebrile.         OBJECTIVE                                                                                                                Oral Mechanism   Oral Motor Assessment: generalized weakness  Saliva Control: intact     Swallowing   Patient positioning: upright in bed  Pretrial vocal quality: dysphonic    Repositioned pt to an upright position w/assist from RN. RN reported pt coughing more w/yogurt this morning. Pt reported gagging due to meds in yogurt. Recent CXR 12/1 revealed no acute infiltrate. Assessed tolerance of PO - full liquid/nectar thick liquids via tsp given feeding assistance and verbal cues for double/hard swallows and intermittent throat clear and reswallow. Known delayed swallow per VFSE (11/24/20) and to palpation. Weak, reduced hyolaryngeal excursion to palpation. Improved timing of second voluntary swallow this day. Gag response x1 w/puree. Per pt, \"it felt like something was inside it.\" No overt s/s of aspiration throughout PO trials. Stable cardiopulmonary status.                 ASSESSMENT                                                                                                   • Impairments: swallowing  • Functional Limitations: eating/drinking  • Skilled therapy is required to establish safe means of nutrition, hydration and medication administration  •   Pt w/overall debility after prolonged hospitalization and acute illness. At risk for aspiration. Persisting moderate oral and moderate-severe pharyngeal dysphagia. Per VFSE (11/24/20), inconsistent, silent aspiration of thin liquids and penetration of nectar and honey thick liquids. Pharyngeal residue  w/increased viscosity requiring pt to swallow multiple times to reduce  residue. Barriers include reduced endurance, at risk for fatigue during meals.      • Rehab Potential: good  • Potential barriers to progress:  Current medical conditions, reduced endurance and severity of deficits  • Progress: Progressing toward goals    Patient at end of session:    - location: in bed    - safety measures: alarm system in place/re-engaged, lines intact, equipment intact, bed rails x4 and call light within reach    - hand off to: nurse    PLAN                                                                                                                               Cautiously continue PO for oral intake and to maintain swallow movement and fxn. STOP FEEDING IF COUGHING. Ongoing dysphagia tx. Pt may benefit from ENT consult if dysphonia persists and/or worsens. Repeat videoswallow eval in 1-2 weeks prior to diet advancement.     Interventions:  Assess tolerance of least restrictive oral diet, patient/family education and dysphagia therapy    Plan/Goal Agreement:  Patient agrees with goals and treatment plan    RECOMMENDATIONS     -Diet:          *nectar-thick liquids and full liquids    -Medication Administration:         *with puree, new aspiration risk and crushed    -Compensatory Strategies:         *throat clear/re-swallow, double swallow liquids, double swallow solids and effortful swallow    -Feeding Guidelines:          *eat slowly only when alert, no straws, sit fully upright for all po intake, small bites/sips, constant supervision, set up tray, feeds self, liquids by tsp only, allow extra time to swallow, feed patient/total feeding assistance and stop feeding if coughing observed    -Additional Swallow Recommendations:         *frequent oral care    -Speech Reviewed Swallow:         *with patient/family, with clinical caregivers and feeding guidelines posted in room (STIC 4 RN (Karla) MD (Christelle))    GOALS                                                                                                                       Long Term Goals:   1) Dysphagia: Pt will tolerate least restrictive PO diet using safe swallow precautions given min verbal cues w/o s/s of aspiration in 90% of trials - PROGRESSING/ONGOING.    Documented in the chart in the following areas: Assessment.     Unable to assess due to patient's cognitive impairment

## 2023-09-14 DIAGNOSIS — J96.01 ACUTE RESPIRATORY FAILURE WITH HYPOXIA: ICD-10-CM

## 2023-09-14 DIAGNOSIS — Z92.81 PERSONAL HISTORY OF EXTRACORPOREAL MEMBRANE OXYGENATION (ECMO): ICD-10-CM

## 2023-09-14 DIAGNOSIS — R53.2 FUNCTIONAL QUADRIPLEGIA: ICD-10-CM

## 2023-09-14 DIAGNOSIS — Z51.5 ENCOUNTER FOR PALLIATIVE CARE: ICD-10-CM

## 2023-09-14 LAB
% GAMMA, URINE: 7.7 % — SIGNIFICANT CHANGE UP
ALBUMIN 24H MFR UR ELPH: 6.9 % — SIGNIFICANT CHANGE UP
ALBUMIN SERPL ELPH-MCNC: 3.1 G/DL — LOW (ref 3.3–5)
ALBUMIN SERPL ELPH-MCNC: 3.2 G/DL — LOW (ref 3.3–5)
ALBUMIN SERPL ELPH-MCNC: 3.4 G/DL — SIGNIFICANT CHANGE UP (ref 3.3–5)
ALP SERPL-CCNC: 66 U/L — SIGNIFICANT CHANGE UP (ref 40–120)
ALP SERPL-CCNC: 66 U/L — SIGNIFICANT CHANGE UP (ref 40–120)
ALP SERPL-CCNC: 71 U/L — SIGNIFICANT CHANGE UP (ref 40–120)
ALPHA1 GLOB 24H MFR UR ELPH: 32.7 % — SIGNIFICANT CHANGE UP
ALPHA2 GLOB 24H MFR UR ELPH: 31.8 % — SIGNIFICANT CHANGE UP
ALT FLD-CCNC: 565 U/L — HIGH (ref 4–33)
ALT FLD-CCNC: 609 U/L — HIGH (ref 4–33)
ALT FLD-CCNC: 620 U/L — HIGH (ref 4–33)
ANION GAP SERPL CALC-SCNC: 11 MMOL/L — SIGNIFICANT CHANGE UP (ref 7–14)
ANION GAP SERPL CALC-SCNC: 17 MMOL/L — HIGH (ref 7–14)
ANION GAP SERPL CALC-SCNC: 21 MMOL/L — HIGH (ref 7–14)
APTT BLD: 101.1 SEC — HIGH (ref 24.5–35.6)
APTT BLD: 63.6 SEC — HIGH (ref 24.5–35.6)
APTT BLD: 74 SEC — HIGH (ref 24.5–35.6)
APTT BLD: 82.8 SEC — HIGH (ref 24.5–35.6)
APTT BLD: 90.2 SEC — HIGH (ref 24.5–35.6)
APTT BLD: 91.7 SEC — HIGH (ref 24.5–35.6)
APTT BLD: 93 SEC — HIGH (ref 24.5–35.6)
AST SERPL-CCNC: 584 U/L — HIGH (ref 4–32)
AST SERPL-CCNC: 614 U/L — HIGH (ref 4–32)
AST SERPL-CCNC: 663 U/L — HIGH (ref 4–32)
B-GLOBULIN 24H MFR UR ELPH: 20.9 % — SIGNIFICANT CHANGE UP
BASOPHILS # BLD AUTO: 0 K/UL — SIGNIFICANT CHANGE UP (ref 0–0.2)
BASOPHILS # BLD AUTO: 0 K/UL — SIGNIFICANT CHANGE UP (ref 0–0.2)
BASOPHILS # BLD AUTO: 0.01 K/UL — SIGNIFICANT CHANGE UP (ref 0–0.2)
BASOPHILS NFR BLD AUTO: 0 % — SIGNIFICANT CHANGE UP (ref 0–2)
BASOPHILS NFR BLD AUTO: 0 % — SIGNIFICANT CHANGE UP (ref 0–2)
BASOPHILS NFR BLD AUTO: 0.2 % — SIGNIFICANT CHANGE UP (ref 0–2)
BILIRUB DIRECT SERPL-MCNC: 2.8 MG/DL — HIGH (ref 0–0.3)
BILIRUB SERPL-MCNC: 4.2 MG/DL — HIGH (ref 0.2–1.2)
BILIRUB SERPL-MCNC: 4.4 MG/DL — HIGH (ref 0.2–1.2)
BILIRUB SERPL-MCNC: 4.6 MG/DL — HIGH (ref 0.2–1.2)
BLOOD GAS ARTERIAL - LYTES,HGB,ICA,LACT RESULT: SIGNIFICANT CHANGE UP
BLOOD GAS ECMO POST MEMBRANE - ARTERIAL RESULT: SIGNIFICANT CHANGE UP
BLOOD GAS ECMO PRE MEMBRANE - VENOUS RESULT: SIGNIFICANT CHANGE UP
BUN SERPL-MCNC: 32 MG/DL — HIGH (ref 7–23)
BUN SERPL-MCNC: 36 MG/DL — HIGH (ref 7–23)
BUN SERPL-MCNC: 39 MG/DL — HIGH (ref 7–23)
C3 SERPL-MCNC: 41 MG/DL — LOW (ref 90–180)
C4 SERPL-MCNC: <4 MG/DL — LOW (ref 10–40)
CALCIUM SERPL-MCNC: 7.8 MG/DL — LOW (ref 8.4–10.5)
CALCIUM SERPL-MCNC: 8.4 MG/DL — SIGNIFICANT CHANGE UP (ref 8.4–10.5)
CALCIUM SERPL-MCNC: 8.6 MG/DL — SIGNIFICANT CHANGE UP (ref 8.4–10.5)
CHLORIDE SERPL-SCNC: 94 MMOL/L — LOW (ref 98–107)
CHLORIDE SERPL-SCNC: 94 MMOL/L — LOW (ref 98–107)
CHLORIDE SERPL-SCNC: 96 MMOL/L — LOW (ref 98–107)
CK SERPL-CCNC: 85 U/L — SIGNIFICANT CHANGE UP (ref 25–170)
CK SERPL-CCNC: 85 U/L — SIGNIFICANT CHANGE UP (ref 25–170)
CO2 SERPL-SCNC: 20 MMOL/L — LOW (ref 22–31)
CO2 SERPL-SCNC: 21 MMOL/L — LOW (ref 22–31)
CO2 SERPL-SCNC: 25 MMOL/L — SIGNIFICANT CHANGE UP (ref 22–31)
CREAT SERPL-MCNC: 0.77 MG/DL — SIGNIFICANT CHANGE UP (ref 0.5–1.3)
CREAT SERPL-MCNC: 0.86 MG/DL — SIGNIFICANT CHANGE UP (ref 0.5–1.3)
CREAT SERPL-MCNC: 0.87 MG/DL — SIGNIFICANT CHANGE UP (ref 0.5–1.3)
CULTURE RESULTS: NO GROWTH — SIGNIFICANT CHANGE UP
EGFR: 74 ML/MIN/1.73M2 — SIGNIFICANT CHANGE UP
EGFR: 75 ML/MIN/1.73M2 — SIGNIFICANT CHANGE UP
EGFR: 86 ML/MIN/1.73M2 — SIGNIFICANT CHANGE UP
EOSINOPHIL # BLD AUTO: 0 K/UL — SIGNIFICANT CHANGE UP (ref 0–0.5)
EOSINOPHIL NFR BLD AUTO: 0 % — SIGNIFICANT CHANGE UP (ref 0–6)
FERRITIN SERPL-MCNC: 3752 NG/ML — HIGH (ref 13–330)
FIBRINOGEN PPP-MCNC: 237 MG/DL — SIGNIFICANT CHANGE UP (ref 200–465)
GLUCOSE BLDC GLUCOMTR-MCNC: 277 MG/DL — HIGH (ref 70–99)
GLUCOSE BLDC GLUCOMTR-MCNC: 334 MG/DL — HIGH (ref 70–99)
GLUCOSE BLDC GLUCOMTR-MCNC: 355 MG/DL — HIGH (ref 70–99)
GLUCOSE BLDC GLUCOMTR-MCNC: 386 MG/DL — HIGH (ref 70–99)
GLUCOSE SERPL-MCNC: 309 MG/DL — HIGH (ref 70–99)
GLUCOSE SERPL-MCNC: 350 MG/DL — HIGH (ref 70–99)
GLUCOSE SERPL-MCNC: 356 MG/DL — HIGH (ref 70–99)
GRAM STN FLD: SIGNIFICANT CHANGE UP
HAPTOGLOB SERPL-MCNC: 36 MG/DL — SIGNIFICANT CHANGE UP (ref 34–200)
HCT VFR BLD CALC: 32.7 % — LOW (ref 34.5–45)
HCT VFR BLD CALC: 32.9 % — LOW (ref 34.5–45)
HCT VFR BLD CALC: 32.9 % — LOW (ref 34.5–45)
HCT VFR BLD CALC: 33.2 % — LOW (ref 34.5–45)
HCT VFR BLD CALC: 35.3 % — SIGNIFICANT CHANGE UP (ref 34.5–45)
HGB BLD-MCNC: 11.2 G/DL — LOW (ref 11.5–15.5)
HGB BLD-MCNC: 11.3 G/DL — LOW (ref 11.5–15.5)
HGB BLD-MCNC: 11.3 G/DL — LOW (ref 11.5–15.5)
HGB BLD-MCNC: 11.6 G/DL — SIGNIFICANT CHANGE UP (ref 11.5–15.5)
HGB BLD-MCNC: 12.1 G/DL — SIGNIFICANT CHANGE UP (ref 11.5–15.5)
IANC: 3.85 K/UL — SIGNIFICANT CHANGE UP (ref 1.8–7.4)
IANC: 3.93 K/UL — SIGNIFICANT CHANGE UP (ref 1.8–7.4)
IANC: 4.28 K/UL — SIGNIFICANT CHANGE UP (ref 1.8–7.4)
IANC: 4.68 K/UL — SIGNIFICANT CHANGE UP (ref 1.8–7.4)
IANC: 5.29 K/UL — SIGNIFICANT CHANGE UP (ref 1.8–7.4)
IMM GRANULOCYTES NFR BLD AUTO: 0.2 % — SIGNIFICANT CHANGE UP (ref 0–0.9)
IMM GRANULOCYTES NFR BLD AUTO: 0.3 % — SIGNIFICANT CHANGE UP (ref 0–0.9)
IMM GRANULOCYTES NFR BLD AUTO: 0.4 % — SIGNIFICANT CHANGE UP (ref 0–0.9)
IMM GRANULOCYTES NFR BLD AUTO: 0.6 % — SIGNIFICANT CHANGE UP (ref 0–0.9)
IMM GRANULOCYTES NFR BLD AUTO: 0.7 % — SIGNIFICANT CHANGE UP (ref 0–0.9)
INR BLD: 2.62 RATIO — HIGH (ref 0.85–1.18)
INR BLD: 3.24 RATIO — HIGH (ref 0.85–1.18)
INR BLD: 3.62 RATIO — HIGH (ref 0.85–1.18)
INR BLD: 3.92 RATIO — HIGH (ref 0.85–1.18)
INR BLD: 3.94 RATIO — HIGH (ref 0.85–1.18)
INTERPRETATION 24H UR IFE-IMP: SIGNIFICANT CHANGE UP
INTERPRETATION 24H UR IFE-IMP: SIGNIFICANT CHANGE UP
LDH SERPL L TO P-CCNC: 966 U/L — HIGH (ref 135–225)
LEGIONELLA AG UR QL: NEGATIVE — SIGNIFICANT CHANGE UP
LYMPHOCYTES # BLD AUTO: 0.25 K/UL — LOW (ref 1–3.3)
LYMPHOCYTES # BLD AUTO: 0.26 K/UL — LOW (ref 1–3.3)
LYMPHOCYTES # BLD AUTO: 0.38 K/UL — LOW (ref 1–3.3)
LYMPHOCYTES # BLD AUTO: 0.55 K/UL — LOW (ref 1–3.3)
LYMPHOCYTES # BLD AUTO: 0.58 K/UL — LOW (ref 1–3.3)
LYMPHOCYTES # BLD AUTO: 10.1 % — LOW (ref 13–44)
LYMPHOCYTES # BLD AUTO: 5.9 % — LOW (ref 13–44)
LYMPHOCYTES # BLD AUTO: 6 % — LOW (ref 13–44)
LYMPHOCYTES # BLD AUTO: 7.8 % — LOW (ref 13–44)
LYMPHOCYTES # BLD AUTO: 9.5 % — LOW (ref 13–44)
M PROTEIN 24H UR ELPH-MRATE: PRESENT
MAGNESIUM SERPL-MCNC: 2.2 MG/DL — SIGNIFICANT CHANGE UP (ref 1.6–2.6)
MAGNESIUM SERPL-MCNC: 2.2 MG/DL — SIGNIFICANT CHANGE UP (ref 1.6–2.6)
MAGNESIUM SERPL-MCNC: 2.3 MG/DL — SIGNIFICANT CHANGE UP (ref 1.6–2.6)
MCHC RBC-ENTMCNC: 30.9 PG — SIGNIFICANT CHANGE UP (ref 27–34)
MCHC RBC-ENTMCNC: 31.6 PG — SIGNIFICANT CHANGE UP (ref 27–34)
MCHC RBC-ENTMCNC: 31.7 PG — SIGNIFICANT CHANGE UP (ref 27–34)
MCHC RBC-ENTMCNC: 31.7 PG — SIGNIFICANT CHANGE UP (ref 27–34)
MCHC RBC-ENTMCNC: 32.3 PG — SIGNIFICANT CHANGE UP (ref 27–34)
MCHC RBC-ENTMCNC: 33.7 GM/DL — SIGNIFICANT CHANGE UP (ref 32–36)
MCHC RBC-ENTMCNC: 34.3 GM/DL — SIGNIFICANT CHANGE UP (ref 32–36)
MCHC RBC-ENTMCNC: 34.3 GM/DL — SIGNIFICANT CHANGE UP (ref 32–36)
MCHC RBC-ENTMCNC: 34.6 GM/DL — SIGNIFICANT CHANGE UP (ref 32–36)
MCHC RBC-ENTMCNC: 35.3 GM/DL — SIGNIFICANT CHANGE UP (ref 32–36)
MCV RBC AUTO: 91.5 FL — SIGNIFICANT CHANGE UP (ref 80–100)
MCV RBC AUTO: 91.6 FL — SIGNIFICANT CHANGE UP (ref 80–100)
MCV RBC AUTO: 91.9 FL — SIGNIFICANT CHANGE UP (ref 80–100)
MCV RBC AUTO: 92.2 FL — SIGNIFICANT CHANGE UP (ref 80–100)
MCV RBC AUTO: 92.2 FL — SIGNIFICANT CHANGE UP (ref 80–100)
MONOCYTES # BLD AUTO: 0.14 K/UL — SIGNIFICANT CHANGE UP (ref 0–0.9)
MONOCYTES # BLD AUTO: 0.14 K/UL — SIGNIFICANT CHANGE UP (ref 0–0.9)
MONOCYTES # BLD AUTO: 0.19 K/UL — SIGNIFICANT CHANGE UP (ref 0–0.9)
MONOCYTES # BLD AUTO: 0.19 K/UL — SIGNIFICANT CHANGE UP (ref 0–0.9)
MONOCYTES # BLD AUTO: 0.21 K/UL — SIGNIFICANT CHANGE UP (ref 0–0.9)
MONOCYTES NFR BLD AUTO: 3.1 % — SIGNIFICANT CHANGE UP (ref 2–14)
MONOCYTES NFR BLD AUTO: 3.2 % — SIGNIFICANT CHANGE UP (ref 2–14)
MONOCYTES NFR BLD AUTO: 3.3 % — SIGNIFICANT CHANGE UP (ref 2–14)
MONOCYTES NFR BLD AUTO: 3.8 % — SIGNIFICANT CHANGE UP (ref 2–14)
MONOCYTES NFR BLD AUTO: 3.9 % — SIGNIFICANT CHANGE UP (ref 2–14)
MRSA PCR RESULT.: SIGNIFICANT CHANGE UP
NEUTROPHILS # BLD AUTO: 3.85 K/UL — SIGNIFICANT CHANGE UP (ref 1.8–7.4)
NEUTROPHILS # BLD AUTO: 3.93 K/UL — SIGNIFICANT CHANGE UP (ref 1.8–7.4)
NEUTROPHILS # BLD AUTO: 4.28 K/UL — SIGNIFICANT CHANGE UP (ref 1.8–7.4)
NEUTROPHILS # BLD AUTO: 4.68 K/UL — SIGNIFICANT CHANGE UP (ref 1.8–7.4)
NEUTROPHILS # BLD AUTO: 5.29 K/UL — SIGNIFICANT CHANGE UP (ref 1.8–7.4)
NEUTROPHILS NFR BLD AUTO: 85.5 % — HIGH (ref 43–77)
NEUTROPHILS NFR BLD AUTO: 87.1 % — HIGH (ref 43–77)
NEUTROPHILS NFR BLD AUTO: 87.5 % — HIGH (ref 43–77)
NEUTROPHILS NFR BLD AUTO: 90.1 % — HIGH (ref 43–77)
NEUTROPHILS NFR BLD AUTO: 90.4 % — HIGH (ref 43–77)
NIGHT BLUE STAIN TISS: SIGNIFICANT CHANGE UP
NRBC # BLD: 0 /100 WBCS — SIGNIFICANT CHANGE UP (ref 0–0)
NRBC # FLD: 0 K/UL — SIGNIFICANT CHANGE UP (ref 0–0)
PHOSPHATE SERPL-MCNC: 3 MG/DL — SIGNIFICANT CHANGE UP (ref 2.5–4.5)
PHOSPHATE SERPL-MCNC: 3.1 MG/DL — SIGNIFICANT CHANGE UP (ref 2.5–4.5)
PHOSPHATE SERPL-MCNC: 3.3 MG/DL — SIGNIFICANT CHANGE UP (ref 2.5–4.5)
PLATELET # BLD AUTO: 69 K/UL — LOW (ref 150–400)
PLATELET # BLD AUTO: 71 K/UL — LOW (ref 150–400)
PLATELET # BLD AUTO: 71 K/UL — LOW (ref 150–400)
PLATELET # BLD AUTO: 76 K/UL — LOW (ref 150–400)
PLATELET # BLD AUTO: 83 K/UL — LOW (ref 150–400)
POTASSIUM SERPL-MCNC: 4 MMOL/L — SIGNIFICANT CHANGE UP (ref 3.5–5.3)
POTASSIUM SERPL-MCNC: 4.1 MMOL/L — SIGNIFICANT CHANGE UP (ref 3.5–5.3)
POTASSIUM SERPL-MCNC: 4.4 MMOL/L — SIGNIFICANT CHANGE UP (ref 3.5–5.3)
POTASSIUM SERPL-SCNC: 4 MMOL/L — SIGNIFICANT CHANGE UP (ref 3.5–5.3)
POTASSIUM SERPL-SCNC: 4.1 MMOL/L — SIGNIFICANT CHANGE UP (ref 3.5–5.3)
POTASSIUM SERPL-SCNC: 4.4 MMOL/L — SIGNIFICANT CHANGE UP (ref 3.5–5.3)
PROT PATTERN 24H UR ELPH-IMP: SIGNIFICANT CHANGE UP
PROT SERPL-MCNC: 4.4 G/DL — LOW (ref 6–8.3)
PROT SERPL-MCNC: 4.8 G/DL — LOW (ref 6–8.3)
PROT SERPL-MCNC: 4.8 G/DL — LOW (ref 6–8.3)
PROTHROM AB SERPL-ACNC: 28.5 SEC — HIGH (ref 9.5–13)
PROTHROM AB SERPL-ACNC: 35.1 SEC — HIGH (ref 9.5–13)
PROTHROM AB SERPL-ACNC: 39.1 SEC — HIGH (ref 9.5–13)
PROTHROM AB SERPL-ACNC: 42.2 SEC — HIGH (ref 9.5–13)
PROTHROM AB SERPL-ACNC: 42.4 SEC — HIGH (ref 9.5–13)
RBC # BLD: 3.56 M/UL — LOW (ref 3.8–5.2)
RBC # BLD: 3.57 M/UL — LOW (ref 3.8–5.2)
RBC # BLD: 3.59 M/UL — LOW (ref 3.8–5.2)
RBC # BLD: 3.63 M/UL — LOW (ref 3.8–5.2)
RBC # BLD: 3.83 M/UL — SIGNIFICANT CHANGE UP (ref 3.8–5.2)
RBC # FLD: 15.3 % — HIGH (ref 10.3–14.5)
RBC # FLD: 15.3 % — HIGH (ref 10.3–14.5)
RBC # FLD: 15.5 % — HIGH (ref 10.3–14.5)
RBC # FLD: 15.6 % — HIGH (ref 10.3–14.5)
RBC # FLD: 15.6 % — HIGH (ref 10.3–14.5)
S AUREUS DNA NOSE QL NAA+PROBE: SIGNIFICANT CHANGE UP
S PNEUM AG UR QL: NEGATIVE — SIGNIFICANT CHANGE UP
SODIUM SERPL-SCNC: 132 MMOL/L — LOW (ref 135–145)
SODIUM SERPL-SCNC: 132 MMOL/L — LOW (ref 135–145)
SODIUM SERPL-SCNC: 135 MMOL/L — SIGNIFICANT CHANGE UP (ref 135–145)
SPECIMEN SOURCE: SIGNIFICANT CHANGE UP
TOTAL VOLUME - 24 HOUR: SIGNIFICANT CHANGE UP ML
URINE CREATININE CALCULATION: SIGNIFICANT CHANGE UP G/24 H (ref 0.8–1.8)
WBC # BLD: 4.27 K/UL — SIGNIFICANT CHANGE UP (ref 3.8–10.5)
WBC # BLD: 4.35 K/UL — SIGNIFICANT CHANGE UP (ref 3.8–10.5)
WBC # BLD: 4.89 K/UL — SIGNIFICANT CHANGE UP (ref 3.8–10.5)
WBC # BLD: 5.47 K/UL — SIGNIFICANT CHANGE UP (ref 3.8–10.5)
WBC # BLD: 6.08 K/UL — SIGNIFICANT CHANGE UP (ref 3.8–10.5)
WBC # FLD AUTO: 4.27 K/UL — SIGNIFICANT CHANGE UP (ref 3.8–10.5)
WBC # FLD AUTO: 4.35 K/UL — SIGNIFICANT CHANGE UP (ref 3.8–10.5)
WBC # FLD AUTO: 4.89 K/UL — SIGNIFICANT CHANGE UP (ref 3.8–10.5)
WBC # FLD AUTO: 5.47 K/UL — SIGNIFICANT CHANGE UP (ref 3.8–10.5)
WBC # FLD AUTO: 6.08 K/UL — SIGNIFICANT CHANGE UP (ref 3.8–10.5)

## 2023-09-14 PROCEDURE — 76604 US EXAM CHEST: CPT | Mod: 26,GC

## 2023-09-14 PROCEDURE — 99497 ADVNCD CARE PLAN 30 MIN: CPT | Mod: 25

## 2023-09-14 PROCEDURE — 33958 ECMO/ECLS REPOS PERPH CNULA: CPT | Mod: AS

## 2023-09-14 PROCEDURE — 99291 CRITICAL CARE FIRST HOUR: CPT | Mod: GC

## 2023-09-14 PROCEDURE — 99498 ADVNCD CARE PLAN ADDL 30 MIN: CPT | Mod: 25

## 2023-09-14 PROCEDURE — 33948 ECMO/ECLS DAILY MGMT-VENOUS: CPT | Mod: GC

## 2023-09-14 PROCEDURE — 71260 CT THORAX DX C+: CPT | Mod: 26

## 2023-09-14 PROCEDURE — 99222 1ST HOSP IP/OBS MODERATE 55: CPT

## 2023-09-14 PROCEDURE — 70450 CT HEAD/BRAIN W/O DYE: CPT | Mod: 26

## 2023-09-14 PROCEDURE — 74177 CT ABD & PELVIS W/CONTRAST: CPT | Mod: 26

## 2023-09-14 PROCEDURE — 93314 ECHO TRANSESOPHAGEAL: CPT | Mod: 26

## 2023-09-14 PROCEDURE — 99223 1ST HOSP IP/OBS HIGH 75: CPT

## 2023-09-14 PROCEDURE — 99292 CRITICAL CARE ADDL 30 MIN: CPT | Mod: GC

## 2023-09-14 RX ORDER — HUMAN INSULIN 100 [IU]/ML
3 INJECTION, SUSPENSION SUBCUTANEOUS EVERY 6 HOURS
Refills: 0 | Status: DISCONTINUED | OUTPATIENT
Start: 2023-09-14 | End: 2023-09-14

## 2023-09-14 RX ORDER — VANCOMYCIN HCL 1 G
2500 VIAL (EA) INTRAVENOUS ONCE
Refills: 0 | Status: COMPLETED | OUTPATIENT
Start: 2023-09-14 | End: 2023-09-14

## 2023-09-14 RX ORDER — VANCOMYCIN HCL 1 G
1750 VIAL (EA) INTRAVENOUS EVERY 12 HOURS
Refills: 0 | Status: DISCONTINUED | OUTPATIENT
Start: 2023-09-14 | End: 2023-09-14

## 2023-09-14 RX ORDER — IPRATROPIUM/ALBUTEROL SULFATE 18-103MCG
3 AEROSOL WITH ADAPTER (GRAM) INHALATION EVERY 6 HOURS
Refills: 0 | Status: DISCONTINUED | OUTPATIENT
Start: 2023-09-14 | End: 2023-10-05

## 2023-09-14 RX ORDER — NALOXEGOL OXALATE 12.5 MG/1
25 TABLET, FILM COATED ORAL DAILY
Refills: 0 | Status: DISCONTINUED | OUTPATIENT
Start: 2023-09-14 | End: 2023-09-20

## 2023-09-14 RX ORDER — HUMAN INSULIN 100 [IU]/ML
9 INJECTION, SUSPENSION SUBCUTANEOUS EVERY 6 HOURS
Refills: 0 | Status: DISCONTINUED | OUTPATIENT
Start: 2023-09-14 | End: 2023-09-15

## 2023-09-14 RX ORDER — CALCIUM GLUCONATE 100 MG/ML
2 VIAL (ML) INTRAVENOUS ONCE
Refills: 0 | Status: COMPLETED | OUTPATIENT
Start: 2023-09-14 | End: 2023-09-14

## 2023-09-14 RX ORDER — INSULIN LISPRO 100/ML
VIAL (ML) SUBCUTANEOUS EVERY 6 HOURS
Refills: 0 | Status: DISCONTINUED | OUTPATIENT
Start: 2023-09-14 | End: 2023-09-15

## 2023-09-14 RX ORDER — PHYTONADIONE (VIT K1) 5 MG
5 TABLET ORAL ONCE
Refills: 0 | Status: COMPLETED | OUTPATIENT
Start: 2023-09-14 | End: 2023-09-14

## 2023-09-14 RX ORDER — FENTANYL CITRATE 50 UG/ML
100 INJECTION INTRAVENOUS ONCE
Refills: 0 | Status: DISCONTINUED | OUTPATIENT
Start: 2023-09-14 | End: 2023-09-16

## 2023-09-14 RX ORDER — VANCOMYCIN HCL 1 G
2000 VIAL (EA) INTRAVENOUS EVERY 12 HOURS
Refills: 0 | Status: DISCONTINUED | OUTPATIENT
Start: 2023-09-14 | End: 2023-09-14

## 2023-09-14 RX ORDER — CISATRACURIUM BESYLATE 2 MG/ML
20 INJECTION INTRAVENOUS ONCE
Refills: 0 | Status: COMPLETED | OUTPATIENT
Start: 2023-09-14 | End: 2023-09-14

## 2023-09-14 RX ORDER — SODIUM CHLORIDE 9 MG/ML
500 INJECTION, SOLUTION INTRAVENOUS ONCE
Refills: 0 | Status: COMPLETED | OUTPATIENT
Start: 2023-09-14 | End: 2023-09-14

## 2023-09-14 RX ORDER — HYDROMORPHONE HYDROCHLORIDE 2 MG/ML
2 INJECTION INTRAMUSCULAR; INTRAVENOUS; SUBCUTANEOUS ONCE
Refills: 0 | Status: DISCONTINUED | OUTPATIENT
Start: 2023-09-14 | End: 2023-09-14

## 2023-09-14 RX ORDER — CISATRACURIUM BESYLATE 2 MG/ML
20 INJECTION INTRAVENOUS ONCE
Refills: 0 | Status: DISCONTINUED | OUTPATIENT
Start: 2023-09-14 | End: 2023-09-16

## 2023-09-14 RX ORDER — AMIODARONE HYDROCHLORIDE 400 MG/1
0.5 TABLET ORAL
Qty: 450 | Refills: 0 | Status: DISCONTINUED | OUTPATIENT
Start: 2023-09-14 | End: 2023-09-14

## 2023-09-14 RX ORDER — HUMAN INSULIN 100 [IU]/ML
5 INJECTION, SUSPENSION SUBCUTANEOUS EVERY 6 HOURS
Refills: 0 | Status: DISCONTINUED | OUTPATIENT
Start: 2023-09-14 | End: 2023-09-14

## 2023-09-14 RX ORDER — VANCOMYCIN HCL 1 G
1500 VIAL (EA) INTRAVENOUS EVERY 12 HOURS
Refills: 0 | Status: DISCONTINUED | OUTPATIENT
Start: 2023-09-14 | End: 2023-09-15

## 2023-09-14 RX ADMIN — NALOXEGOL OXALATE 25 MILLIGRAM(S): 12.5 TABLET, FILM COATED ORAL at 12:48

## 2023-09-14 RX ADMIN — Medication 36.9 MICROGRAM(S)/KG/MIN: at 20:04

## 2023-09-14 RX ADMIN — PROPOFOL 11.8 MICROGRAM(S)/KG/MIN: 10 INJECTION, EMULSION INTRAVENOUS at 20:03

## 2023-09-14 RX ADMIN — VASOPRESSIN 9 UNIT(S)/MIN: 20 INJECTION INTRAVENOUS at 20:03

## 2023-09-14 RX ADMIN — POLYETHYLENE GLYCOL 3350 17 GRAM(S): 17 POWDER, FOR SOLUTION ORAL at 05:14

## 2023-09-14 RX ADMIN — Medication 101 MILLIGRAM(S): at 11:43

## 2023-09-14 RX ADMIN — Medication 3: at 05:17

## 2023-09-14 RX ADMIN — HUMAN INSULIN 5 UNIT(S): 100 INJECTION, SUSPENSION SUBCUTANEOUS at 17:51

## 2023-09-14 RX ADMIN — POLYETHYLENE GLYCOL 3350 17 GRAM(S): 17 POWDER, FOR SOLUTION ORAL at 17:52

## 2023-09-14 RX ADMIN — CISATRACURIUM BESYLATE 20 MILLIGRAM(S): 2 INJECTION INTRAVENOUS at 10:00

## 2023-09-14 RX ADMIN — CHLORHEXIDINE GLUCONATE 15 MILLILITER(S): 213 SOLUTION TOPICAL at 05:14

## 2023-09-14 RX ADMIN — SENNA PLUS 10 MILLILITER(S): 8.6 TABLET ORAL at 05:14

## 2023-09-14 RX ADMIN — AMIODARONE HYDROCHLORIDE 16.7 MG/MIN: 400 TABLET ORAL at 07:38

## 2023-09-14 RX ADMIN — Medication 10: at 13:14

## 2023-09-14 RX ADMIN — Medication 50 MILLIGRAM(S): at 11:28

## 2023-09-14 RX ADMIN — HUMAN INSULIN 3 UNIT(S): 100 INJECTION, SUSPENSION SUBCUTANEOUS at 14:12

## 2023-09-14 RX ADMIN — PROPOFOL 11.8 MICROGRAM(S)/KG/MIN: 10 INJECTION, EMULSION INTRAVENOUS at 07:39

## 2023-09-14 RX ADMIN — MEROPENEM 100 MILLIGRAM(S): 1 INJECTION INTRAVENOUS at 20:03

## 2023-09-14 RX ADMIN — Medication 160 MILLIGRAM(S): at 11:44

## 2023-09-14 RX ADMIN — PANTOPRAZOLE SODIUM 40 MILLIGRAM(S): 20 TABLET, DELAYED RELEASE ORAL at 11:43

## 2023-09-14 RX ADMIN — CHLORHEXIDINE GLUCONATE 1 APPLICATION(S): 213 SOLUTION TOPICAL at 05:18

## 2023-09-14 RX ADMIN — HUMAN INSULIN 9 UNIT(S): 100 INJECTION, SUSPENSION SUBCUTANEOUS at 23:48

## 2023-09-14 RX ADMIN — Medication 50 GRAM(S): at 16:25

## 2023-09-14 RX ADMIN — AZITHROMYCIN 255 MILLIGRAM(S): 500 TABLET, FILM COATED ORAL at 17:52

## 2023-09-14 RX ADMIN — MEROPENEM 100 MILLIGRAM(S): 1 INJECTION INTRAVENOUS at 12:48

## 2023-09-14 RX ADMIN — FENTANYL CITRATE 100 MICROGRAM(S): 50 INJECTION INTRAVENOUS at 11:35

## 2023-09-14 RX ADMIN — HYDROMORPHONE HYDROCHLORIDE 2 MILLIGRAM(S): 2 INJECTION INTRAMUSCULAR; INTRAVENOUS; SUBCUTANEOUS at 10:00

## 2023-09-14 RX ADMIN — MEROPENEM 100 MILLIGRAM(S): 1 INJECTION INTRAVENOUS at 02:06

## 2023-09-14 RX ADMIN — Medication 187.5 MILLIGRAM(S): at 08:24

## 2023-09-14 RX ADMIN — Medication 3 MILLILITER(S): at 21:35

## 2023-09-14 RX ADMIN — SODIUM CHLORIDE 500 MILLILITER(S): 9 INJECTION, SOLUTION INTRAVENOUS at 04:25

## 2023-09-14 RX ADMIN — Medication 36.9 MICROGRAM(S)/KG/MIN: at 07:40

## 2023-09-14 RX ADMIN — HYDROMORPHONE HYDROCHLORIDE 1 MG/HR: 2 INJECTION INTRAMUSCULAR; INTRAVENOUS; SUBCUTANEOUS at 20:04

## 2023-09-14 RX ADMIN — HYDROMORPHONE HYDROCHLORIDE 1 MG/HR: 2 INJECTION INTRAMUSCULAR; INTRAVENOUS; SUBCUTANEOUS at 07:39

## 2023-09-14 RX ADMIN — VASOPRESSIN 9 UNIT(S)/MIN: 20 INJECTION INTRAVENOUS at 07:38

## 2023-09-14 RX ADMIN — SENNA PLUS 10 MILLILITER(S): 8.6 TABLET ORAL at 17:51

## 2023-09-14 RX ADMIN — Medication 8: at 17:51

## 2023-09-14 RX ADMIN — Medication 10: at 23:47

## 2023-09-14 RX ADMIN — PHENYLEPHRINE HYDROCHLORIDE 36.9 MICROGRAM(S)/KG/MIN: 10 INJECTION INTRAVENOUS at 20:04

## 2023-09-14 RX ADMIN — CHLORHEXIDINE GLUCONATE 15 MILLILITER(S): 213 SOLUTION TOPICAL at 17:51

## 2023-09-14 RX ADMIN — CISATRACURIUM BESYLATE 20 MILLIGRAM(S): 2 INJECTION INTRAVENOUS at 11:35

## 2023-09-14 RX ADMIN — Medication 250 MILLIGRAM(S): at 21:04

## 2023-09-14 RX ADMIN — PHENYLEPHRINE HYDROCHLORIDE 36.9 MICROGRAM(S)/KG/MIN: 10 INJECTION INTRAVENOUS at 07:40

## 2023-09-14 NOTE — DIETITIAN INITIAL EVALUATION ADULT - ADD RECOMMEND
---- Monitor tolerance to TF, GI status, electrolytes, glucose  ---- Continue bowel regimen  ---- Modify insulin regimen

## 2023-09-14 NOTE — CHART NOTE - NSCHARTNOTEFT_GEN_A_CORE
63 y/o male on VV ECMO  Asked by MICU team to help reposition right groin ECMO cannula.  Under sterile conditions, and with Dr. Quiñones providing RITCHIE guidance, right groin cannula pulled-back ~3-4cm to position tip of cannula just inferior to hepatic vein.  Cannula sutured in place.  Continues on vasopressin, levophed, phenylephrine drips, and sedated w/ propofol and dilaudid drips. 63 y/o male on VV ECMO  Asked by MICU team to help reposition right groin ECMO cannula.  Under sterile conditions, and with Dr. Quiñones providing RITCHIE guidance, right groin cannula pulled-back ~3-4cm to position tip of cannula just inferior to hepatic vein.  Cannula sutured in place.  Continues on VV ECMO, on vasopressin, levophed, phenylephrine drips, and sedated w/ propofol and dilaudid drips.

## 2023-09-14 NOTE — DIETITIAN INITIAL EVALUATION ADULT - ETIOLOGY
Endocrine dysfunction (Hx of DM), metabolic causes in critical illness, [prior] steroid use, obesity

## 2023-09-14 NOTE — CONSULT NOTE ADULT - PROBLEM SELECTOR RECOMMENDATION 3
PPSV 10%  Needs assistance with all ADLs  Skin care, frequent repositioning  High risk for complications

## 2023-09-14 NOTE — OCCUPATIONAL THERAPY INITIAL EVALUATION ADULT - ADDITIONAL COMMENTS
Patient's partner provided social history upon evaluation. 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 The apartment has an elevator, where the patient will be recovering. During the winter they go to a private house in [place]. She was independent in ADLs and functional mobility.

## 2023-09-14 NOTE — DIETITIAN INITIAL EVALUATION ADULT - REASON FOR ADMISSION
62 year old female with a past medical history of Afib, and sciatica, who presented to [facility] for shortness of breath.  Found to be hypoxic and have interstitial lung disease appearance on CT, admitted (8/26) for acute hypoxic respiratory failure.  Pt. now intubated & cannulated on (9/13) and transferred to [facility] for VV-ECMO.

## 2023-09-14 NOTE — PROGRESS NOTE ADULT - ATTENDING COMMENTS
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 for acute hypoxemic and hypercapnic respiratory failure requiring mechanical ventilatory and VV-ECMO.    Patient was intially admitted to [facility] on 8/26 after p/w SOB and found to be hypoxemic. Of note, as per chart review and wife, patient has had SOB  for several months. They stayed in [place] from 11/2022 to 8/20/2023 and while there, patient noted onset of chronic SOB several months ago. Also endorse onset of debilitating b/l hand cramps and some muscles weakness several months as well. Patient went to see a neurologist and had an MRI which reportedly showed cervical spine issues for which she was recently started on Prednisone shortly before admission. Also endorses single episode of painful rash on her shins, ears and neck and chest which resolved with a steroid cream from a dermatologist.     During admission at [facility], patient underwent bronchoscopy with TBBX on 8/30 which showed NSIP/OP. Labs notable for positive ARIANA 1:1280, RNP > 8, Alejandro > 8. Patient was started on Solumedrol 60mg q6h from 9/1 to 9/6 then weaned over time to then Medrol 32 mg 9/9 to 9/12. Patient was also on Cellcept 9/8 to 9/11 but was stopped due to tachycardia. During this time, patient was on 2 to 4 L nc. Interval CTA chest on 9/11 also showed improved in reticular findings and diffuse GGO. On 9/12 early AM, patient had episode of SVT to 170s with then rapidly progressive hypoxemic respiratory failure leading to intubation this AM 9/13. Patient was restarted on empiric abx with Vanc and Zosyn on 9/12. Despite best practices, patient remamined hypoxemic and patient was cannulated for VV-ECMO on 9/13 and transferred to [facility].    #Shock - Likely mixed with newly decreased RV systolic function and RV dilation and distributive from sepsis  #Septic Shock  #RV dysfunction  #Shock Liver  #Elevated bilirubin  #ARDS  #Multifocal Pneumonia  #MCTD-ILD  #Acute hypoxemic and hypercapnic respiratory failure - Differential includes bacterial PNA, atypical PNA, aspiration, AEILD. Angiogram during cannulation 9/13 negative for PE  #VV-ECMO  #Afib w/ RVR  #Oropharyngeal dysphagia  #Hyperglycemia  - c/w vasopressors to maintain MAP > 65. Vasopressors continue to improve off milrinone stopped shortly after arrival. Will monitor RV size and function with serial TTE/RITCHIE  - Spontaneous cardioversion to sinu fady overnight s/p amiodarone gtt. No MEREDITH thrombus on RITCHIE on 9/13. Monitor on tele  - c/w sedation, currently on Dilaudid and Propofol. Paralytics if unable to keep concordant with vent. Daily sedation vacation  - c/w mechanical ventilatory support, currently on rest settings PC RR 12, PS 12, PEEP 12, 100%  - Will wean NO with close monitoring of RV size and function  - c/w VV-ECMO support, 25 Fr RIJ and 19Fr RFem. Pull cannula is within RA with tip near distal SVC. Venous SO2 higher today in 80s. Will call Thoracic to pull back Rfem pull cannula today. Currently on Sweep 4, Flow 3.8 to 4.  - f/u BAL cyto, cell count, cultures, PCP PCR, fungitell, aspergillus  - f/u blood and urine cultures, MRSA PCR, urine legionella. RVP negative  - c/w empiric abx Vanc, Wilmer and Azithro  - Will f/u rheum given concern for DAH. Will c/w pulse dose steroids for now. Will discuss with Rheum re PLEX/steroid sparing agent  - Trend LFTs for shock liver, peaked but T bili elevated. Will obtain fractionated Bili  - Will dose Vitamin K x 1  - c/w Argatroban gtt, tranfuse as needed. Trend LDH and haptoglobin  - CTH, CT chest and CT AP w/ contrast today  - f/u official LE duplex  - f/u offical TTE  - Start low dose NPH with sliding scale. Monitor FSG    #GOC - Full Code    #DVT ppx - Argatroban gtt    #POCUS - See POCUS note    Total time spent on VV ECMO management was 30 minutes, separate from time spent on critical care management, procedures, and teaching.    Jozef Borden MD  Pulmonary & Critical Care

## 2023-09-14 NOTE — CONSULT NOTE ADULT - ASSESSMENT
62 year old female with a past medical history of afib, and sciatica, who presented to [facility] for shortness of breath. Transferred from [facility] for vvECMO. Palliative Care consulted for complex decision making in the setting of serious illness.

## 2023-09-14 NOTE — OCCUPATIONAL THERAPY INITIAL EVALUATION ADULT - PERTINENT HX OF CURRENT PROBLEM, REHAB EVAL
62 year old female with a past medical history of afib, and sciatica, who presented to [facility] for shortness of breath.At [facility] found to be hypoxic and have interstitial lung disease appearance on CT, admitted 8/26 for AHRF, further ILD workup and management.  Pt was started on prednisone on admission with gradually improving O2 requirement and has been stable on 2-3LNC since 9/3. Patient continued to functionally declined an was  Intubated and started on mechanical ventilation but required very high PEEP (18) and 100% FiO2 and still had low saturations. VV ECMO team consulted and accepted to [facility]. Cannulated with 25 F drainage cannula in R Fem V and 23F “arterial” cannula in RIJ. Transferred to [facility] on 9/13/23.

## 2023-09-14 NOTE — CONSULT NOTE ADULT - PROBLEM SELECTOR RECOMMENDATION 9
Next of kin: Partner of 20+years Kiara Delgado. She indicates there is a HCP document and will email to me. Regardless she would be next of kin according to LSA. No adult children, one sister who is aware of her condition, she resides in [place].   Functional status prior to ECMO: working even while at [facility]. Highly functional.   Support: Partner, sister residing in [place]. Per partner she's very well loved and supported by family and friends.   Referrals:  [ x] SW: See [facility] note   [ x] Chaplaincy  [ ] Child life  [x ] Caregiver support group    Met with partner Kiara at bedside, introduced the role of Palliative Care in the setting of vvECMO  Assessed understanding of the severity of illness involved and the complexities of ECMO therapy as well as uncertainty of outcomes.   Family showed understanding, questions answered, support provided.

## 2023-09-14 NOTE — DIETITIAN INITIAL EVALUATION ADULT - PERTINENT LABORATORY DATA
09-14    135  |  94<L>  |  32<H>  ----------------------------<  309<H>  4.4   |  20<L>  |  0.87    Ca    8.4      14 Sep 2023 04:00  Phos  3.3     09-14  Mg     2.20     09-14    Glucose: 333 mg/dL (09.13.23 @ 20:26)  Glucose: 271 mg/dL (09.13.23 @ 17:41)      CAPILLARY BLOOD GLUCOSE  POCT Blood Glucose.: 277 mg/dL (14 Sep 2023 05:12)  POCT Blood Glucose.: 296 mg/dL (13 Sep 2023 23:06)    A1C with Estimated Average Glucose Result: 6.1 % (08-27-23 @ 05:30)    Triglycerides, Serum: 104 mg/dL (08.27.23 @ 05:30)

## 2023-09-14 NOTE — CHART NOTE - NSCHARTNOTEFT_GEN_A_CORE
: Michi Bolaños    INDICATION: Respiratory failure    PROCEDURE:  [ ] LIMITED ECHO  [X] LIMITED CHEST  [ ] LIMITED RETROPERITONEAL  [ ] LIMITED ABDOMINAL  [ ] LIMITED DVT  [ ] NEEDLE GUIDANCE VASCULAR  [ ] NEEDLE GUIDANCE THORACENTESIS  [ ] NEEDLE GUIDANCE PARACENTESIS  [ ] NEEDLE GUIDANCE PERICARDIOCENTESIS  [ ] OTHER    FINDINGS: Diffuse B lines with irregular pleura. Dense bibasilar consolidation pattern.      INTERPRETATION: Lung US c/w ARDS    Images stored on [employer].    Jozef Borden MD  Pulmonary & Critical Care

## 2023-09-14 NOTE — GOALS OF CARE CONVERSATION - ADVANCED CARE PLANNING - CONVERSATION DETAILS
Referral to palliative care team for complex decision making and symptom management in setting of VV ECMO. Pt was transferred from [facility] for ECMO.   Introduced palliative care team as an extra layer of support for the patient and family to provide an opportunity for the family to share their understanding/concerns regarding their loved one on the ECMO circuit. The goal is to elicit their understanding of the complexities of this form of life support, ensuring that the families have appropriate insight and information. Assessed family's support system and provided information regarding resources for support including: chaplaincy, child life, and caregiver support if necessary. Met with pt's partner Kiara at bedside. She shared that she has been partners with Zo for 26 years. Kiara appears to understand  Zo's medical complexities. She is being realistic while maintaining hope that Zo's medical course will improve and hopes she will be able to come off the ECMO circuit. She recognizes that her partner is in a critical state receiving the highest form of life support.  Kiara was appreciative of palliative care support.   Caregiver  referral made; Kiara was open and receptive to additional support.

## 2023-09-14 NOTE — OCCUPATIONAL THERAPY INITIAL EVALUATION ADULT - GENERAL OBSERVATIONS, REHAB EVAL
Patient received semisupine in bed. Seated and intubated into ventilator. +ECMO line +IV lines +Catheter. HR 56 bpm and BP 93/56 mmHg. Patient left semisupine in bed in NAD. Partner at bedside.

## 2023-09-14 NOTE — PROGRESS NOTE ADULT - SUBJECTIVE AND OBJECTIVE BOX
CHIEF COMPLAINT:    Interval Events:    HPI:  62 year old female with a past medical history of afib, and sciatica, who presented to [facility] [place] for shortness of breath. She had gone to [facility] where she had CXR which showed bilateral lower lobe infiltrates so was sent to ER. She had been having exertional dyspnea and hypoxemia (on home pulse ox to 82%). She had been having dyspnea for several months and had a workup in [place] with a CT scan (which was negative for PE). She also states that she was seen by a pulmonologist and rheumatology, where they ruled out lupus and other immunological disorders.    [facility] Hospital Course  Found to be hypoxic and have interstitial lung disease appearance on CT, admitted 8/26 for AHRF, further ILD workup and management. TTE 8/26 with normal LVEF. Pt was started on prednisone on admission with gradually improving O2 requirement and has been stable on 2-3LNC since 9/3. Started steroid taper on 9/6 and fully transitioned to PO 9/8 overnight. Started on Mycophenolate mofetil (cellcept) 9/8 evening but pt reported subjective side effects with weakness. Episode of AF w RVR with HR up to 140s on 9/11 am, decreased to HR 10-110s with IV lopressor 10. CTA negative for PE and showed interval improvements in GGO but possible lingular bleb and RML bronchiectasis. Patient received 2L of but before more fluids and beta-blocker pt developed heart palpitations with EKG HR 170s with SVT. BPs 105/70s. Did not respond to vagal maneuvers. Pt given oral lopressor 12.5 and IV lopressor 5, with improvement of HR to 130s. SBP briefly dropped to 60-70s then recovered. Patient on NRB offered HFNC/BiPAP but refused. Started on empiric vancomycin and zosyn. BCx w/ NGTD. Per pulm increased solumedrol to 40mg qd. Patient acceded to HFNC in which she desatted and presented with increased wob for which she was transitioned to BiPAP. Patient with anxiety while on BiPAP presenting tachypnea and desatting to the 80s despite verbal redirection for which she was administered ativan 1mg IVP x1. Patient distress improved with sats in the low 90s but had desaturation to 70s. STAT CXR showed increased pulmonary congestion for which patient was administered lasix without improvement. Intubated and started on mechanical ventilation but required very high PEEP (18) and 100% FiO2 and still had low saturations. VV ECMO team consulted and accepted to [facility] center. Per signout patient had RV enlargement and dysfunction on POCUS with concern for either new PE vs high pulmonary pressures due to PEEP 18 and lung dysfunction. Patient went for cannulation at [facility] with flouro showing no acute PE. Cannulated with 25 F drainage cannula in R Fem V and 23F “arterial” cannula in RIJ.    (13 Sep 2023 15:24)      REVIEW OF SYSTEMS:  Constitutional: [ ] negative [ ] fevers [ ] chills [ ] weight loss [ ] weight gain  HEENT: [ ] negative [ ] dry eyes [ ] eye irritation [ ] postnasal drip [ ] nasal congestion  CV: [ ] negative  [ ] chest pain [ ] orthopnea [ ] palpitations [ ] murmur  Resp: [ ] negative [ ] cough [ ] shortness of breath [ ] dyspnea [ ] wheezing [ ] sputum [ ] hemoptysis  GI: [ ] negative [ ] nausea [ ] vomiting [ ] diarrhea [ ] constipation [ ] abd pain [ ] dysphagia   : [ ] negative [ ] dysuria [ ] nocturia [ ] hematuria [ ] increased urinary frequency  Musculoskeletal: [ ] negative [ ] back pain [ ] myalgias [ ] arthralgias [ ] fracture  Skin: [ ] negative [ ] rash [ ] itch  Neurological: [ ] negative [ ] headache [ ] dizziness [ ] syncope [ ] weakness [ ] numbness  Psychiatric: [ ] negative [ ] anxiety [ ] depression  Endocrine: [ ] negative [ ] diabetes [ ] thyroid problem  Hematologic/Lymphatic: [ ] negative [ ] anemia [ ] bleeding problem  Allergic/Immunologic: [ ] negative [ ] itchy eyes [ ] nasal discharge [ ] hives [ ] angioedema  [ ] All other systems negative  [ ] Unable to assess ROS because ________    OBJECTIVE:  ICU Vital Signs Last 24 Hrs  T(C): 35.7 (14 Sep 2023 04:00), Max: 36.9 (13 Sep 2023 08:43)  T(F): 96.3 (14 Sep 2023 04:00), Max: 97.7 (13 Sep 2023 15:30)  HR: 57 (14 Sep 2023 06:00) (57 - 158)  BP: 84/45 (13 Sep 2023 08:43) (60/39 - 138/93)  BP(mean): 62 (13 Sep 2023 08:43) (45 - 110)  ABP: 99/59 (14 Sep 2023 06:00) (72/48 - 157/65)  ABP(mean): 74 (14 Sep 2023 06:00) (51 - 114)  RR: 12 (14 Sep 2023 06:00) (0 - 32)  SpO2: 99% (14 Sep 2023 06:00) (57% - 100%)    O2 Parameters below as of 14 Sep 2023 06:00  Patient On (Oxygen Delivery Method): ventilator    O2 Concentration (%): 80      Mode: AC/ CMV (Assist Control/ Continuous Mandatory Ventilation), RR (machine): 12, FiO2: 80, PEEP: 12, ITime: 1, MAP: 15, PC: 12, PIP: 24    09-13 @ 07:01  -  09-14 @ 06:16  --------------------------------------------------------  IN: 2556.9 mL / OUT: 3180 mL / NET: -623.1 mL      CAPILLARY BLOOD GLUCOSE      POCT Blood Glucose.: 277 mg/dL (14 Sep 2023 05:12)      Physical Exam:   Constitutional: ill appearing, no acute distress   HEENT: + PERRLA, EOMI, no drainage or redness  Neck: supple,  No JVD  Respiratory: Ventilator assisted breath Sounds equal & clear bilaterally to auscultation, no accessory muscle use noted  Cardiovascular: Regular rate, regular rhythm, normal S1, S2; no murmurs or rub  Gastrointestinal: Soft, non-tender, non distended, no hepatosplenomegaly, normal bowel sounds  Extremities: + 2 peripheral edema, no cyanosis, no clubbing   Vascular: Equal and normal pulses: 2+ peripheral pulses throughout  Neurological: sedated/intubated  Psychiatric: calm, normal mood, normal affect  Musculoskeletal: No joint swelling or deformity; no limitation of movement  Skin: warm, dry, well perfused, no rashes    HOSPITAL MEDICATIONS:  Standing Meds:  aMIOdarone Infusion 0.5 mG/Min IV Continuous <Continuous>  argatroban Infusion 0.15 MICROgram(s)/kG/Min IV Continuous <Continuous>  azithromycin  IVPB      azithromycin  IVPB 500 milliGRAM(s) IV Intermittent every 24 hours  chlorhexidine 0.12% Liquid 15 milliLiter(s) Oral Mucosa every 12 hours  chlorhexidine 2% Cloths 1 Application(s) Topical <User Schedule>  cisatracurium Injectable 20 milliGRAM(s) IV Push once  dextrose 5%. 1000 milliLiter(s) IV Continuous <Continuous>  dextrose 5%. 1000 milliLiter(s) IV Continuous <Continuous>  dextrose 50% Injectable 25 Gram(s) IV Push once  dextrose 50% Injectable 12.5 Gram(s) IV Push once  dextrose 50% Injectable 25 Gram(s) IV Push once  fentaNYL    Injectable 100 MICROGram(s) IV Push once  glucagon  Injectable 1 milliGRAM(s) IntraMuscular once  HYDROmorphone Infusion. 1 mG/Hr IV Continuous <Continuous>  insulin lispro (ADMELOG) corrective regimen sliding scale   SubCutaneous every 6 hours  meropenem  IVPB      meropenem  IVPB 1000 milliGRAM(s) IV Intermittent every 8 hours  milrinone Infusion 0.375 MICROgram(s)/kG/Min IV Continuous <Continuous>  naloxegol 25 milliGRAM(s) Oral once  norepinephrine Infusion 0.3 MICROgram(s)/kG/Min IV Continuous <Continuous>  pantoprazole  Injectable 40 milliGRAM(s) IV Push daily  phenylephrine    Infusion 1.5 MICROgram(s)/kG/Min IV Continuous <Continuous>  polyethylene glycol 3350 17 Gram(s) Oral two times a day  propofol Infusion 15.001 MICROgram(s)/kG/Min IV Continuous <Continuous>  senna Syrup 10 milliLiter(s) Oral two times a day  trimethoprim  40 mG/sulfamethoxazole 200 mG Suspension 160 milliGRAM(s) Oral daily  vasopressin Infusion 0.06 Unit(s)/Min IV Continuous <Continuous>      PRN Meds:  dextrose Oral Gel 15 Gram(s) Oral once PRN      LABS:                        12.1   6.08  )-----------( 83       ( 14 Sep 2023 04:00 )             35.3     Hgb Trend: 12.1<--, 11.5<--, 13.2<--, 15.7<--, 15.9<--  09-14    135  |  94<L>  |  32<H>  ----------------------------<  309<H>  4.4   |  20<L>  |  0.87    Ca    8.4      14 Sep 2023 04:00  Phos  3.3     09-14  Mg     2.20     09-14    TPro  4.8<L>  /  Alb  3.4  /  TBili  4.2<H>  /  DBili  x   /  AST  663<H>  /  ALT  609<H>  /  AlkPhos  71  09-14    Creatinine Trend: 0.87<--, 0.95<--, 0.97<--, 1.02<--, 0.95<--, 0.84<--  PT/INR - ( 14 Sep 2023 04:00 )   PT: 42.2 sec;   INR: 3.92 ratio         PTT - ( 14 Sep 2023 04:00 )  PTT:101.1 sec  Urinalysis Basic - ( 14 Sep 2023 04:00 )    Color: x / Appearance: x / SG: x / pH: x  Gluc: 309 mg/dL / Ketone: x  / Bili: x / Urobili: x   Blood: x / Protein: x / Nitrite: x   Leuk Esterase: x / RBC: x / WBC x   Sq Epi: x / Non Sq Epi: x / Bacteria: x      Arterial Blood Gas:  09-14 @ 04:00  7.35/41/120/23/99.3/-2.9  ABG lactate: --  Arterial Blood Gas:  09-13 @ 19:55  7.31/46/126/23/99.1/-3.2  ABG lactate: --  Arterial Blood Gas:  09-13 @ 15:44  7.31/43/78/22/96.2/-4.4  ABG lactate: --  Arterial Blood Gas:  09-13 @ 12:07  7.32/39/165/20/99.8/-5.6  ABG lactate: --  Arterial Blood Gas:  09-13 @ 11:20  7.37/37/86/21/98.6/-3.4  ABG lactate: --  Arterial Blood Gas:  09-13 @ 10:46  7.38/40/107/24/99.9/-1.3  ABG lactate: --  Arterial Blood Gas:  09-13 @ 10:29  7.22/38/153/16/99.5/-11.4  ABG lactate: --  Arterial Blood Gas:  09-13 @ 10:05  --/--/--/--/22.2/--  ABG lactate: --  Arterial Blood Gas:  09-13 @ 08:33  7.07/47/63/14/86.6/-16.3  ABG lactate: --  Arterial Blood Gas:  09-13 @ 08:21  7.05/47/74/13/91.2/-17.2  ABG lactate: --  Arterial Blood Gas:  09-13 @ 07:58  7.08/53/46/16/62.4/-14.4  ABG lactate: --  Arterial Blood Gas:  09-13 @ 07:26  7.10/60/57/19/79.4/-11.6  ABG lactate: --    Venous Blood Gas:  09-13 @ 12:46  7.29/49/39/24/60.4  VBG Lactate: --  Venous Blood Gas:  09-13 @ 12:26  7.32/44/51/23/82.9  VBG Lactate: --  Venous Blood Gas:  09-13 @ 10:08  --/--/--/--/22  VBG Lactate: --      MICROBIOLOGY:     Culture - Blood (collected 12 Sep 2023 03:43)  Source: .Blood Blood  Preliminary Report (14 Sep 2023 06:00):    No growth at 2 days.    Culture - Blood (collected 12 Sep 2023 03:43)  Source: .Blood Blood  Preliminary Report (14 Sep 2023 06:00):    No growth at 2 days.    Urinalysis with Rflx Culture (collected 12 Sep 2023 02:56)      RADIOLOGY:  [ ] Reviewed and interpreted by me           Interval Events:    HPI:  62 year old female with a past medical history of afib, and sciatica, who presented to [facility] for shortness of breath. She had gone to [facility] where she had CXR which showed bilateral lower lobe infiltrates so was sent to ER. She had been having exertional dyspnea and hypoxemia (on home pulse ox to 82%). She had been having dyspnea for several months and had a workup in [place] with a CT scan (which was negative for PE). She also states that she was seen by a pulmonologist and rheumatology, where they ruled out lupus and other immunological disorders.    [facility] Hospital Course  Found to be hypoxic and have interstitial lung disease appearance on CT, admitted 8/26 for AHRF, further ILD workup and management. TTE 8/26 with normal LVEF. Pt was started on prednisone on admission with gradually improving O2 requirement and has been stable on 2-3LNC since 9/3. Started steroid taper on 9/6 and fully transitioned to PO 9/8 overnight. Started on Mycophenolate mofetil (cellcept) 9/8 evening but pt reported subjective side effects with weakness. Episode of AF w RVR with HR up to 140s on 9/11 am, decreased to HR 10-110s with IV lopressor 10. CTA negative for PE and showed interval improvements in GGO but possible lingular bleb and RML bronchiectasis. Patient received 2L of but before more fluids and beta-blocker pt developed heart palpitations with EKG HR 170s with SVT. BPs 105/70s. Did not respond to vagal maneuvers. Pt given oral lopressor 12.5 and IV lopressor 5, with improvement of HR to 130s. SBP briefly dropped to 60-70s then recovered. Patient on NRB offered HFNC/BiPAP but refused. Started on empiric vancomycin and zosyn. BCx w/ NGTD. Per pulm increased solumedrol to 40mg qd. Patient acceded to HFNC in which she desatted and presented with increased wob for which she was transitioned to BiPAP. Patient with anxiety while on BiPAP presenting tachypnea and desatting to the 80s despite verbal redirection for which she was administered ativan 1mg IVP x1. Patient distress improved with sats in the low 90s but had desaturation to 70s. STAT CXR showed increased pulmonary congestion for which patient was administered lasix without improvement. Intubated and started on mechanical ventilation but required very high PEEP (18) and 100% FiO2 and still had low saturations. VV ECMO team consulted and accepted to [facility] center. Per signout patient had RV enlargement and dysfunction on POCUS with concern for either new PE vs high pulmonary pressures due to PEEP 18 and lung dysfunction. Patient went for cannulation at [facility] with flouro showing no acute PE. Cannulated with 25 F drainage cannula in R Fem V and 23F “arterial” cannula in RIJ.    (13 Sep 2023 15:24)      REVIEW OF SYSTEMS:    [ ] All other systems negative  [x] Unable to assess ROS because patient is intubated and sedated    OBJECTIVE:  ICU Vital Signs Last 24 Hrs  T(C): 35.7 (14 Sep 2023 04:00), Max: 36.9 (13 Sep 2023 08:43)  T(F): 96.3 (14 Sep 2023 04:00), Max: 97.7 (13 Sep 2023 15:30)  HR: 57 (14 Sep 2023 06:00) (57 - 158)  BP: 84/45 (13 Sep 2023 08:43) (60/39 - 138/93)  BP(mean): 62 (13 Sep 2023 08:43) (45 - 110)  ABP: 99/59 (14 Sep 2023 06:00) (72/48 - 157/65)  ABP(mean): 74 (14 Sep 2023 06:00) (51 - 114)  RR: 12 (14 Sep 2023 06:00) (0 - 32)  SpO2: 99% (14 Sep 2023 06:00) (57% - 100%)    O2 Parameters below as of 14 Sep 2023 06:00  Patient On (Oxygen Delivery Method): ventilator    O2 Concentration (%): 80      Mode: AC/ CMV (Assist Control/ Continuous Mandatory Ventilation), RR (machine): 12, FiO2: 80, PEEP: 12, ITime: 1, MAP: 15, PC: 12, PIP: 24    09-13 @ 07:01  -  09-14 @ 06:16  --------------------------------------------------------  IN: 2556.9 mL / OUT: 3180 mL / NET: -623.1 mL      CAPILLARY BLOOD GLUCOSE      POCT Blood Glucose.: 277 mg/dL (14 Sep 2023 05:12)      Physical Exam:   Constitutional: ill appearing, no acute distress   HEENT: + PERRLA, EOMI, no drainage or redness  Neck: supple,  No JVD  Respiratory: Ventilator assisted breath Sounds equal & clear bilaterally to auscultation, no accessory muscle use noted  Cardiovascular: Regular rate, regular rhythm, normal S1, S2; no murmurs or rub  Gastrointestinal: Soft, non-tender, non distended, no hepatosplenomegaly, normal bowel sounds  Extremities: + 2 peripheral edema, no cyanosis, no clubbing   Vascular: Equal and normal pulses: 2+ peripheral pulses throughout  Neurological: sedated/intubated  Psychiatric: calm, normal mood, normal affect  Musculoskeletal: No joint swelling or deformity; no limitation of movement  Skin: warm, dry, well perfused, no rashes    HOSPITAL MEDICATIONS:  Standing Meds:  aMIOdarone Infusion 0.5 mG/Min IV Continuous <Continuous>  argatroban Infusion 0.15 MICROgram(s)/kG/Min IV Continuous <Continuous>  azithromycin  IVPB      azithromycin  IVPB 500 milliGRAM(s) IV Intermittent every 24 hours  chlorhexidine 0.12% Liquid 15 milliLiter(s) Oral Mucosa every 12 hours  chlorhexidine 2% Cloths 1 Application(s) Topical <User Schedule>  cisatracurium Injectable 20 milliGRAM(s) IV Push once  dextrose 5%. 1000 milliLiter(s) IV Continuous <Continuous>  dextrose 5%. 1000 milliLiter(s) IV Continuous <Continuous>  dextrose 50% Injectable 25 Gram(s) IV Push once  dextrose 50% Injectable 12.5 Gram(s) IV Push once  dextrose 50% Injectable 25 Gram(s) IV Push once  fentaNYL    Injectable 100 MICROGram(s) IV Push once  glucagon  Injectable 1 milliGRAM(s) IntraMuscular once  HYDROmorphone Infusion. 1 mG/Hr IV Continuous <Continuous>  insulin lispro (ADMELOG) corrective regimen sliding scale   SubCutaneous every 6 hours  meropenem  IVPB      meropenem  IVPB 1000 milliGRAM(s) IV Intermittent every 8 hours  milrinone Infusion 0.375 MICROgram(s)/kG/Min IV Continuous <Continuous>  naloxegol 25 milliGRAM(s) Oral once  norepinephrine Infusion 0.3 MICROgram(s)/kG/Min IV Continuous <Continuous>  pantoprazole  Injectable 40 milliGRAM(s) IV Push daily  phenylephrine    Infusion 1.5 MICROgram(s)/kG/Min IV Continuous <Continuous>  polyethylene glycol 3350 17 Gram(s) Oral two times a day  propofol Infusion 15.001 MICROgram(s)/kG/Min IV Continuous <Continuous>  senna Syrup 10 milliLiter(s) Oral two times a day  trimethoprim  40 mG/sulfamethoxazole 200 mG Suspension 160 milliGRAM(s) Oral daily  vasopressin Infusion 0.06 Unit(s)/Min IV Continuous <Continuous>      PRN Meds:  dextrose Oral Gel 15 Gram(s) Oral once PRN      LABS:                        12.1   6.08  )-----------( 83       ( 14 Sep 2023 04:00 )             35.3     Hgb Trend: 12.1<--, 11.5<--, 13.2<--, 15.7<--, 15.9<--  09-14    135  |  94<L>  |  32<H>  ----------------------------<  309<H>  4.4   |  20<L>  |  0.87    Ca    8.4      14 Sep 2023 04:00  Phos  3.3     09-14  Mg     2.20     09-14    TPro  4.8<L>  /  Alb  3.4  /  TBili  4.2<H>  /  DBili  x   /  AST  663<H>  /  ALT  609<H>  /  AlkPhos  71  09-14    Creatinine Trend: 0.87<--, 0.95<--, 0.97<--, 1.02<--, 0.95<--, 0.84<--  PT/INR - ( 14 Sep 2023 04:00 )   PT: 42.2 sec;   INR: 3.92 ratio         PTT - ( 14 Sep 2023 04:00 )  PTT:101.1 sec  Urinalysis Basic - ( 14 Sep 2023 04:00 )    Color: x / Appearance: x / SG: x / pH: x  Gluc: 309 mg/dL / Ketone: x  / Bili: x / Urobili: x   Blood: x / Protein: x / Nitrite: x   Leuk Esterase: x / RBC: x / WBC x   Sq Epi: x / Non Sq Epi: x / Bacteria: x      Arterial Blood Gas:  09-14 @ 04:00  7.35/41/120/23/99.3/-2.9  ABG lactate: --  Arterial Blood Gas:  09-13 @ 19:55  7.31/46/126/23/99.1/-3.2  ABG lactate: --  Arterial Blood Gas:  09-13 @ 15:44  7.31/43/78/22/96.2/-4.4  ABG lactate: --  Arterial Blood Gas:  09-13 @ 12:07  7.32/39/165/20/99.8/-5.6  ABG lactate: --  Arterial Blood Gas:  09-13 @ 11:20  7.37/37/86/21/98.6/-3.4  ABG lactate: --  Arterial Blood Gas:  09-13 @ 10:46  7.38/40/107/24/99.9/-1.3  ABG lactate: --  Arterial Blood Gas:  09-13 @ 10:29  7.22/38/153/16/99.5/-11.4  ABG lactate: --  Arterial Blood Gas:  09-13 @ 10:05  --/--/--/--/22.2/--  ABG lactate: --  Arterial Blood Gas:  09-13 @ 08:33  7.07/47/63/14/86.6/-16.3  ABG lactate: --  Arterial Blood Gas:  09-13 @ 08:21  7.05/47/74/13/91.2/-17.2  ABG lactate: --  Arterial Blood Gas:  09-13 @ 07:58  7.08/53/46/16/62.4/-14.4  ABG lactate: --  Arterial Blood Gas:  09-13 @ 07:26  7.10/60/57/19/79.4/-11.6  ABG lactate: --    Venous Blood Gas:  09-13 @ 12:46  7.29/49/39/24/60.4  VBG Lactate: --  Venous Blood Gas:  09-13 @ 12:26  7.32/44/51/23/82.9  VBG Lactate: --  Venous Blood Gas:  09-13 @ 10:08  --/--/--/--/22  VBG Lactate: --      MICROBIOLOGY:     Culture - Blood (collected 12 Sep 2023 03:43)  Source: .Blood Blood  Preliminary Report (14 Sep 2023 06:00):    No growth at 2 days.    Culture - Blood (collected 12 Sep 2023 03:43)  Source: .Blood Blood  Preliminary Report (14 Sep 2023 06:00):    No growth at 2 days.    Urinalysis with Rflx Culture (collected 12 Sep 2023 02:56)      RADIOLOGY:  [ ] Reviewed and interpreted by me           Interval Events:  -    HPI:  62 year old female with a past medical history of afib, and sciatica, who presented to [facility] for shortness of breath. She had gone to [facility] where she had CXR which showed bilateral lower lobe infiltrates so was sent to ER. She had been having exertional dyspnea and hypoxemia (on home pulse ox to 82%). She had been having dyspnea for several months and had a workup in [place] with a CT scan (which was negative for PE). She also states that she was seen by a pulmonologist and rheumatology, where they ruled out lupus and other immunological disorders.    [facility] Hospital Course  Found to be hypoxic and have interstitial lung disease appearance on CT, admitted 8/26 for AHRF, further ILD workup and management. TTE 8/26 with normal LVEF. Pt was started on prednisone on admission with gradually improving O2 requirement and has been stable on 2-3LNC since 9/3. Started steroid taper on 9/6 and fully transitioned to PO 9/8 overnight. Started on Mycophenolate mofetil (cellcept) 9/8 evening but pt reported subjective side effects with weakness. Episode of AF w RVR with HR up to 140s on 9/11 am, decreased to HR 10-110s with IV lopressor 10. CTA negative for PE and showed interval improvements in GGO but possible lingular bleb and RML bronchiectasis. Patient received 2L of but before more fluids and beta-blocker pt developed heart palpitations with EKG HR 170s with SVT. BPs 105/70s. Did not respond to vagal maneuvers. Pt given oral lopressor 12.5 and IV lopressor 5, with improvement of HR to 130s. SBP briefly dropped to 60-70s then recovered. Patient on NRB offered HFNC/BiPAP but refused. Started on empiric vancomycin and zosyn. BCx w/ NGTD. Per pulm increased solumedrol to 40mg qd. Patient acceded to HFNC in which she desatted and presented with increased wob for which she was transitioned to BiPAP. Patient with anxiety while on BiPAP presenting tachypnea and desatting to the 80s despite verbal redirection for which she was administered ativan 1mg IVP x1. Patient distress improved with sats in the low 90s but had desaturation to 70s. STAT CXR showed increased pulmonary congestion for which patient was administered lasix without improvement. Intubated and started on mechanical ventilation but required very high PEEP (18) and 100% FiO2 and still had low saturations. VV ECMO team consulted and accepted to [facility] center. Per signout patient had RV enlargement and dysfunction on POCUS with concern for either new PE vs high pulmonary pressures due to PEEP 18 and lung dysfunction. Patient went for cannulation at [facility] with flouro showing no acute PE. Cannulated with 25 F drainage cannula in R Fem V and 23F “arterial” cannula in RIJ.    (13 Sep 2023 15:24)      REVIEW OF SYSTEMS:    [ ] All other systems negative  [x] Unable to assess ROS because patient is intubated and sedated    OBJECTIVE:  ICU Vital Signs Last 24 Hrs  T(C): 35.7 (14 Sep 2023 04:00), Max: 36.9 (13 Sep 2023 08:43)  T(F): 96.3 (14 Sep 2023 04:00), Max: 97.7 (13 Sep 2023 15:30)  HR: 57 (14 Sep 2023 06:00) (57 - 158)  BP: 84/45 (13 Sep 2023 08:43) (60/39 - 138/93)  BP(mean): 62 (13 Sep 2023 08:43) (45 - 110)  ABP: 99/59 (14 Sep 2023 06:00) (72/48 - 157/65)  ABP(mean): 74 (14 Sep 2023 06:00) (51 - 114)  RR: 12 (14 Sep 2023 06:00) (0 - 32)  SpO2: 99% (14 Sep 2023 06:00) (57% - 100%)    O2 Parameters below as of 14 Sep 2023 06:00  Patient On (Oxygen Delivery Method): ventilator    O2 Concentration (%): 80      Mode: AC/ CMV (Assist Control/ Continuous Mandatory Ventilation), RR (machine): 12, FiO2: 80, PEEP: 12, ITime: 1, MAP: 15, PC: 12, PIP: 24    09-13 @ 07:01  -  09-14 @ 06:16  --------------------------------------------------------  IN: 2556.9 mL / OUT: 3180 mL / NET: -623.1 mL      CAPILLARY BLOOD GLUCOSE      POCT Blood Glucose.: 277 mg/dL (14 Sep 2023 05:12)      Physical Exam:   Constitutional: ill appearing, no acute distress   HEENT: + PERRLA, EOMI, no drainage or redness  Neck: supple,  No JVD  Respiratory: Ventilator assisted breath Sounds equal & clear bilaterally to auscultation, no accessory muscle use noted  Cardiovascular: Regular rate, regular rhythm, normal S1, S2; no murmurs or rub  Gastrointestinal: Soft, non-tender, non distended, no hepatosplenomegaly, normal bowel sounds  Extremities: + 2 peripheral edema, no cyanosis, no clubbing   Vascular: Equal and normal pulses: 2+ peripheral pulses throughout  Neurological: sedated/intubated  Psychiatric: calm, normal mood, normal affect  Musculoskeletal: No joint swelling or deformity; no limitation of movement  Skin: warm, dry, well perfused, no rashes    HOSPITAL MEDICATIONS:  Standing Meds:  aMIOdarone Infusion 0.5 mG/Min IV Continuous <Continuous>  argatroban Infusion 0.15 MICROgram(s)/kG/Min IV Continuous <Continuous>  azithromycin  IVPB      azithromycin  IVPB 500 milliGRAM(s) IV Intermittent every 24 hours  chlorhexidine 0.12% Liquid 15 milliLiter(s) Oral Mucosa every 12 hours  chlorhexidine 2% Cloths 1 Application(s) Topical <User Schedule>  cisatracurium Injectable 20 milliGRAM(s) IV Push once  dextrose 5%. 1000 milliLiter(s) IV Continuous <Continuous>  dextrose 5%. 1000 milliLiter(s) IV Continuous <Continuous>  dextrose 50% Injectable 25 Gram(s) IV Push once  dextrose 50% Injectable 12.5 Gram(s) IV Push once  dextrose 50% Injectable 25 Gram(s) IV Push once  fentaNYL    Injectable 100 MICROGram(s) IV Push once  glucagon  Injectable 1 milliGRAM(s) IntraMuscular once  HYDROmorphone Infusion. 1 mG/Hr IV Continuous <Continuous>  insulin lispro (ADMELOG) corrective regimen sliding scale   SubCutaneous every 6 hours  meropenem  IVPB      meropenem  IVPB 1000 milliGRAM(s) IV Intermittent every 8 hours  milrinone Infusion 0.375 MICROgram(s)/kG/Min IV Continuous <Continuous>  naloxegol 25 milliGRAM(s) Oral once  norepinephrine Infusion 0.3 MICROgram(s)/kG/Min IV Continuous <Continuous>  pantoprazole  Injectable 40 milliGRAM(s) IV Push daily  phenylephrine    Infusion 1.5 MICROgram(s)/kG/Min IV Continuous <Continuous>  polyethylene glycol 3350 17 Gram(s) Oral two times a day  propofol Infusion 15.001 MICROgram(s)/kG/Min IV Continuous <Continuous>  senna Syrup 10 milliLiter(s) Oral two times a day  trimethoprim  40 mG/sulfamethoxazole 200 mG Suspension 160 milliGRAM(s) Oral daily  vasopressin Infusion 0.06 Unit(s)/Min IV Continuous <Continuous>      PRN Meds:  dextrose Oral Gel 15 Gram(s) Oral once PRN      LABS:                        12.1   6.08  )-----------( 83       ( 14 Sep 2023 04:00 )             35.3     Hgb Trend: 12.1<--, 11.5<--, 13.2<--, 15.7<--, 15.9<--  09-14    135  |  94<L>  |  32<H>  ----------------------------<  309<H>  4.4   |  20<L>  |  0.87    Ca    8.4      14 Sep 2023 04:00  Phos  3.3     09-14  Mg     2.20     09-14    TPro  4.8<L>  /  Alb  3.4  /  TBili  4.2<H>  /  DBili  x   /  AST  663<H>  /  ALT  609<H>  /  AlkPhos  71  09-14    Creatinine Trend: 0.87<--, 0.95<--, 0.97<--, 1.02<--, 0.95<--, 0.84<--  PT/INR - ( 14 Sep 2023 04:00 )   PT: 42.2 sec;   INR: 3.92 ratio         PTT - ( 14 Sep 2023 04:00 )  PTT:101.1 sec  Urinalysis Basic - ( 14 Sep 2023 04:00 )    Color: x / Appearance: x / SG: x / pH: x  Gluc: 309 mg/dL / Ketone: x  / Bili: x / Urobili: x   Blood: x / Protein: x / Nitrite: x   Leuk Esterase: x / RBC: x / WBC x   Sq Epi: x / Non Sq Epi: x / Bacteria: x      Arterial Blood Gas:  09-14 @ 04:00  7.35/41/120/23/99.3/-2.9  ABG lactate: --  Arterial Blood Gas:  09-13 @ 19:55  7.31/46/126/23/99.1/-3.2  ABG lactate: --  Arterial Blood Gas:  09-13 @ 15:44  7.31/43/78/22/96.2/-4.4  ABG lactate: --  Arterial Blood Gas:  09-13 @ 12:07  7.32/39/165/20/99.8/-5.6  ABG lactate: --  Arterial Blood Gas:  09-13 @ 11:20  7.37/37/86/21/98.6/-3.4  ABG lactate: --  Arterial Blood Gas:  09-13 @ 10:46  7.38/40/107/24/99.9/-1.3  ABG lactate: --  Arterial Blood Gas:  09-13 @ 10:29  7.22/38/153/16/99.5/-11.4  ABG lactate: --  Arterial Blood Gas:  09-13 @ 10:05  --/--/--/--/22.2/--  ABG lactate: --  Arterial Blood Gas:  09-13 @ 08:33  7.07/47/63/14/86.6/-16.3  ABG lactate: --  Arterial Blood Gas:  09-13 @ 08:21  7.05/47/74/13/91.2/-17.2  ABG lactate: --  Arterial Blood Gas:  09-13 @ 07:58  7.08/53/46/16/62.4/-14.4  ABG lactate: --  Arterial Blood Gas:  09-13 @ 07:26  7.10/60/57/19/79.4/-11.6  ABG lactate: --    Venous Blood Gas:  09-13 @ 12:46  7.29/49/39/24/60.4  VBG Lactate: --  Venous Blood Gas:  09-13 @ 12:26  7.32/44/51/23/82.9  VBG Lactate: --  Venous Blood Gas:  09-13 @ 10:08  --/--/--/--/22  VBG Lactate: --      MICROBIOLOGY:     Culture - Blood (collected 12 Sep 2023 03:43)  Source: .Blood Blood  Preliminary Report (14 Sep 2023 06:00):    No growth at 2 days.    Culture - Blood (collected 12 Sep 2023 03:43)  Source: .Blood Blood  Preliminary Report (14 Sep 2023 06:00):    No growth at 2 days.    Urinalysis with Rflx Culture (collected 12 Sep 2023 02:56)      RADIOLOGY:  [ ] Reviewed and interpreted by me           Interval Events:  -No evidence of chattering noted on ECMO circuit, sweep remains on 4 and fi02 100%  -LR 500ml for rising lactate, 7.4.   -Milrinone remained off  -Pressors lowered to [place] 0.7, Levo 0.03 and Vaso 0.06  -Converted to sinus rhythm, amio gtt discontinued iso fady 40-50's        HPI:  62 year old female with a past medical history of afib, and sciatica, who presented to [facility] for shortness of breath. She had gone to [facility] where she had CXR which showed bilateral lower lobe infiltrates so was sent to ER. She had been having exertional dyspnea and hypoxemia (on home pulse ox to 82%). She had been having dyspnea for several months and had a workup in [place] with a CT scan (which was negative for PE). She also states that she was seen by a pulmonologist and rheumatology, where they ruled out lupus and other immunological disorders.    [facility] Course  Found to be hypoxic and have interstitial lung disease appearance on CT, admitted 8/26 for AHRF, further ILD workup and management. TTE 8/26 with normal LVEF. Pt was started on prednisone on admission with gradually improving O2 requirement and has been stable on 2-3LNC since 9/3. Started steroid taper on 9/6 and fully transitioned to PO 9/8 overnight. Started on Mycophenolate mofetil (cellcept) 9/8 evening but pt reported subjective side effects with weakness. Episode of AF w RVR with HR up to 140s on 9/11 am, decreased to HR 10-110s with IV lopressor 10. CTA negative for PE and showed interval improvements in GGO but possible lingular bleb and RML bronchiectasis. Patient received 2L of but before more fluids and beta-blocker pt developed heart palpitations with EKG HR 170s with SVT. BPs 105/70s. Did not respond to vagal maneuvers. Pt given oral lopressor 12.5 and IV lopressor 5, with improvement of HR to 130s. SBP briefly dropped to 60-70s then recovered. Patient on NRB offered HFNC/BiPAP but refused. Started on empiric vancomycin and zosyn. BCx w/ NGTD. Per pulm increased solumedrol to 40mg qd. Patient acceded to HFNC in which she desatted and presented with increased wob for which she was transitioned to BiPAP. Patient with anxiety while on BiPAP presenting tachypnea and desatting to the 80s despite verbal redirection for which she was administered ativan 1mg IVP x1. Patient distress improved with sats in the low 90s but had desaturation to 70s. STAT CXR showed increased pulmonary congestion for which patient was administered lasix without improvement. Intubated and started on mechanical ventilation but required very high PEEP (18) and 100% FiO2 and still had low saturations. VV ECMO team consulted and accepted to [facility] Injury center. Per signout patient had RV enlargement and dysfunction on POCUS with concern for either new PE vs high pulmonary pressures due to PEEP 18 and lung dysfunction. Patient went for cannulation at [facility] with flouro showing no acute PE. Cannulated with 25 F drainage cannula in R Fem V and 23F “arterial” cannula in RIJ.    (13 Sep 2023 15:24)      REVIEW OF SYSTEMS:    [ ] All other systems negative  [x] Unable to assess ROS because patient is intubated and sedated    OBJECTIVE:  ICU Vital Signs Last 24 Hrs  T(C): 35.7 (14 Sep 2023 04:00), Max: 36.9 (13 Sep 2023 08:43)  T(F): 96.3 (14 Sep 2023 04:00), Max: 97.7 (13 Sep 2023 15:30)  HR: 57 (14 Sep 2023 06:00) (57 - 158)  BP: 84/45 (13 Sep 2023 08:43) (60/39 - 138/93)  BP(mean): 62 (13 Sep 2023 08:43) (45 - 110)  ABP: 99/59 (14 Sep 2023 06:00) (72/48 - 157/65)  ABP(mean): 74 (14 Sep 2023 06:00) (51 - 114)  RR: 12 (14 Sep 2023 06:00) (0 - 32)  SpO2: 99% (14 Sep 2023 06:00) (57% - 100%)    O2 Parameters below as of 14 Sep 2023 06:00  Patient On (Oxygen Delivery Method): ventilator    O2 Concentration (%): 80      Mode: AC/ CMV (Assist Control/ Continuous Mandatory Ventilation), RR (machine): 12, FiO2: 80, PEEP: 12, ITime: 1, MAP: 15, PC: 12, PIP: 24    09-13 @ 07:01  -  09-14 @ 06:16  --------------------------------------------------------  IN: 2556.9 mL / OUT: 3180 mL / NET: -623.1 mL      CAPILLARY BLOOD GLUCOSE      POCT Blood Glucose.: 277 mg/dL (14 Sep 2023 05:12)      Physical Exam:   Constitutional: no acute distress   HEENT: + PERRLA, EOMI, no drainage or redness  Neck: supple, RIJ ECMO cannula, LIJ TLC  Respiratory: Ventilator assisted breath Sounds equal & clear bilaterally to auscultation, no accessory muscle use noted  Cardiovascular: Sinus fady, regular rate, regular rhythm, normal S1, S2; no murmurs or rub  Gastrointestinal: Soft, non-tender, non distended, no hepatosplenomegaly, normal bowel sounds  Extremities: + 2 peripheral edema, R. Fem ECMO cannula  Vascular: Equal and normal pulses: 2+ peripheral pulses by doppler   Neurological: sedated/intubated  Psychiatric: calm, normal mood, normal affect  Musculoskeletal: No joint swelling or deformity; no limitation of movement  Skin: warm, dry, well perfused, no rashes    HOSPITAL MEDICATIONS:  Standing Meds:  aMIOdarone Infusion 0.5 mG/Min IV Continuous <Continuous>  argatroban Infusion 0.15 MICROgram(s)/kG/Min IV Continuous <Continuous>  azithromycin  IVPB      azithromycin  IVPB 500 milliGRAM(s) IV Intermittent every 24 hours  chlorhexidine 0.12% Liquid 15 milliLiter(s) Oral Mucosa every 12 hours  chlorhexidine 2% Cloths 1 Application(s) Topical <User Schedule>  cisatracurium Injectable 20 milliGRAM(s) IV Push once  dextrose 5%. 1000 milliLiter(s) IV Continuous <Continuous>  dextrose 5%. 1000 milliLiter(s) IV Continuous <Continuous>  dextrose 50% Injectable 25 Gram(s) IV Push once  dextrose 50% Injectable 12.5 Gram(s) IV Push once  dextrose 50% Injectable 25 Gram(s) IV Push once  fentaNYL    Injectable 100 MICROGram(s) IV Push once  glucagon  Injectable 1 milliGRAM(s) IntraMuscular once  HYDROmorphone Infusion. 1 mG/Hr IV Continuous <Continuous>  insulin lispro (ADMELOG) corrective regimen sliding scale   SubCutaneous every 6 hours  meropenem  IVPB      meropenem  IVPB 1000 milliGRAM(s) IV Intermittent every 8 hours  milrinone Infusion 0.375 MICROgram(s)/kG/Min IV Continuous <Continuous>  naloxegol 25 milliGRAM(s) Oral once  norepinephrine Infusion 0.3 MICROgram(s)/kG/Min IV Continuous <Continuous>  pantoprazole  Injectable 40 milliGRAM(s) IV Push daily  phenylephrine    Infusion 1.5 MICROgram(s)/kG/Min IV Continuous <Continuous>  polyethylene glycol 3350 17 Gram(s) Oral two times a day  propofol Infusion 15.001 MICROgram(s)/kG/Min IV Continuous <Continuous>  senna Syrup 10 milliLiter(s) Oral two times a day  trimethoprim  40 mG/sulfamethoxazole 200 mG Suspension 160 milliGRAM(s) Oral daily  vasopressin Infusion 0.06 Unit(s)/Min IV Continuous <Continuous>      PRN Meds:  dextrose Oral Gel 15 Gram(s) Oral once PRN      LABS:                        12.1   6.08  )-----------( 83       ( 14 Sep 2023 04:00 )             35.3     Hgb Trend: 12.1<--, 11.5<--, 13.2<--, 15.7<--, 15.9<--  09-14    135  |  94<L>  |  32<H>  ----------------------------<  309<H>  4.4   |  20<L>  |  0.87    Ca    8.4      14 Sep 2023 04:00  Phos  3.3     09-14  Mg     2.20     09-14    TPro  4.8<L>  /  Alb  3.4  /  TBili  4.2<H>  /  DBili  x   /  AST  663<H>  /  ALT  609<H>  /  AlkPhos  71  09-14    Creatinine Trend: 0.87<--, 0.95<--, 0.97<--, 1.02<--, 0.95<--, 0.84<--  PT/INR - ( 14 Sep 2023 04:00 )   PT: 42.2 sec;   INR: 3.92 ratio         PTT - ( 14 Sep 2023 04:00 )  PTT:101.1 sec  Urinalysis Basic - ( 14 Sep 2023 04:00 )    Color: x / Appearance: x / SG: x / pH: x  Gluc: 309 mg/dL / Ketone: x  / Bili: x / Urobili: x   Blood: x / Protein: x / Nitrite: x   Leuk Esterase: x / RBC: x / WBC x   Sq Epi: x / Non Sq Epi: x / Bacteria: x      Arterial Blood Gas:  09-14 @ 04:00  7.35/41/120/23/99.3/-2.9  ABG lactate: --  Arterial Blood Gas:  09-13 @ 19:55  7.31/46/126/23/99.1/-3.2  ABG lactate: --  Arterial Blood Gas:  09-13 @ 15:44  7.31/43/78/22/96.2/-4.4  ABG lactate: --  Arterial Blood Gas:  09-13 @ 12:07  7.32/39/165/20/99.8/-5.6  ABG lactate: --  Arterial Blood Gas:  09-13 @ 11:20  7.37/37/86/21/98.6/-3.4  ABG lactate: --  Arterial Blood Gas:  09-13 @ 10:46  7.38/40/107/24/99.9/-1.3  ABG lactate: --  Arterial Blood Gas:  09-13 @ 10:29  7.22/38/153/16/99.5/-11.4  ABG lactate: --  Arterial Blood Gas:  09-13 @ 10:05  --/--/--/--/22.2/--  ABG lactate: --  Arterial Blood Gas:  09-13 @ 08:33  7.07/47/63/14/86.6/-16.3  ABG lactate: --  Arterial Blood Gas:  09-13 @ 08:21  7.05/47/74/13/91.2/-17.2  ABG lactate: --  Arterial Blood Gas:  09-13 @ 07:58  7.08/53/46/16/62.4/-14.4  ABG lactate: --  Arterial Blood Gas:  09-13 @ 07:26  7.10/60/57/19/79.4/-11.6  ABG lactate: --    Venous Blood Gas:  09-13 @ 12:46  7.29/49/39/24/60.4  VBG Lactate: --  Venous Blood Gas:  09-13 @ 12:26  7.32/44/51/23/82.9  VBG Lactate: --  Venous Blood Gas:  09-13 @ 10:08  --/--/--/--/22  VBG Lactate: --      MICROBIOLOGY:     Culture - Blood (collected 12 Sep 2023 03:43)  Source: .Blood Blood  Preliminary Report (14 Sep 2023 06:00):    No growth at 2 days.    Culture - Blood (collected 12 Sep 2023 03:43)  Source: .Blood Blood  Preliminary Report (14 Sep 2023 06:00):    No growth at 2 days.    Urinalysis with Rflx Culture (collected 12 Sep 2023 02:56)      RADIOLOGY:  [ ] Reviewed and interpreted by me

## 2023-09-14 NOTE — PHYSICAL THERAPY INITIAL EVALUATION ADULT - PERTINENT HX OF CURRENT PROBLEM, REHAB EVAL
Pt is a 62 year old female with a past medical history of afib, and sciatica, who presented to [facility] for shortness of breath. She had gone to [facility] where she had CXR which showed bilateral lower lobe infiltrates so was sent to ER. She had been having exertional dyspnea and hypoxemia (on home pulse ox to 82%). She had been having dyspnea for several months and had a workup in [place] with a CT scan (which was negative for PE). She also states that she was seen by a pulmonologist and rheumatology, where they ruled out lupus and other immunological disorders.    [facility] Hospital Course  Found to be hypoxic and have interstitial lung disease appearance on CT, admitted 8/26 for AHRF, further ILD workup and management. TTE 8/26 with normal LVEF. Pt was started on prednisone on admission with gradually improving O2 requirement and has been stable on 2-3LNC since 9/3. Started steroid taper on 9/6 and fully transitioned to PO 9/8 overnight. Started on Mycophenolate mofetil (cellcept) 9/8 evening but pt reported subjective side effects with weakness. Episode of AF w RVR with HR up to 140s on 9/11 am, decreased to HR 10-110s with IV lopressor 10. CTA negative for PE and showed interval improvements in GGO but possible lingular bleb and RML bronchiectasis. Patient received 2L of but before more fluids and beta-blocker pt developed heart palpitations with EKG HR 170s with SVT. BPs 105/70s. Did not respond to vagal maneuvers. Pt given oral lopressor 12.5 and IV lopressor 5, with improvement of HR to 130s. SBP briefly dropped to 60-70s then recovered. Patient on NRB offered HFNC/BiPAP but refused. Started on empiric vancomycin and zosyn. BCx w/ NGTD. Per pulm increased solumedrol to 40mg qd. Patient acceded to HFNC in which she desatted and presented with increased wob for which she was transitioned to BiPAP. Patient with anxiety while on BiPAP presenting tachypnea and desatting to the 80s despite verbal redirection for which she was administered ativan 1mg IVP x1. Patient distress improved with sats in the low 90s but had desaturation to 70s. STAT CXR showed increased pulmonary congestion for which patient was administered lasix without improvement. Intubated and started on mechanical ventilation but required very high PEEP (18) and 100% FiO2 and still had low saturations. VV ECMO team consulted and accepted to [facility] Acute Lung Injury center. Per signout patient had RV enlargement and dysfunction on POCUS with concern for either new PE vs high pulmonary pressures due to PEEP 18 and lung dysfunction. Patient went for cannulation at [facility] with flouro showing no acute PE. Cannulated with 25 F drainage cannula in R Fem V and 23F “arterial” cannula in RIJ.

## 2023-09-14 NOTE — DIETITIAN INITIAL EVALUATION ADULT - OTHER INFO
Present during interdisciplinary rounds re: Pt.    Plan is to initiate enteral feeding.  Pt. to remain on current rate of Propofol @ this time (38.3mL/hr).  NPH to be added to insulin regimen considering persistently elevated glucose.

## 2023-09-14 NOTE — OCCUPATIONAL THERAPY INITIAL EVALUATION ADULT - RANGE OF MOTION EXAMINATION, UPPER EXTREMITY
bilateral shoulder assessed 0-40 degrees due to ECMO lines/IV lines/bilateral UE Passive ROM was WFL  (within functional limits)

## 2023-09-14 NOTE — DIETITIAN INITIAL EVALUATION ADULT - PERTINENT MEDS FT
MEDICATIONS  (STANDING):  aMIOdarone Infusion 0.5 mG/Min (16.7 mL/Hr) IV Continuous <Continuous>  argatroban Infusion 0.1 MICROgram(s)/kG/Min (0.77 mL/Hr) IV Continuous <Continuous>  azithromycin  IVPB 500 milliGRAM(s) IV Intermittent every 24 hours  azithromycin  IVPB      chlorhexidine 0.12% Liquid 15 milliLiter(s) Oral Mucosa every 12 hours  chlorhexidine 2% Cloths 1 Application(s) Topical <User Schedule>  cisatracurium Injectable 20 milliGRAM(s) IV Push once  dextrose 5%. 1000 milliLiter(s) (50 mL/Hr) IV Continuous <Continuous>  dextrose 5%. 1000 milliLiter(s) (100 mL/Hr) IV Continuous <Continuous>  dextrose 50% Injectable 25 Gram(s) IV Push once  dextrose 50% Injectable 12.5 Gram(s) IV Push once  dextrose 50% Injectable 25 Gram(s) IV Push once  fentaNYL    Injectable 100 MICROGram(s) IV Push once  glucagon  Injectable 1 milliGRAM(s) IntraMuscular once  HYDROmorphone Infusion. 1 mG/Hr (1 mL/Hr) IV Continuous <Continuous>  insulin lispro (ADMELOG) corrective regimen sliding scale   SubCutaneous every 6 hours  insulin NPH human recombinant 3 Unit(s) SubCutaneous every 6 hours  meropenem  IVPB      meropenem  IVPB 1000 milliGRAM(s) IV Intermittent every 8 hours  methylPREDNISolone sodium succinate IVPB 1000 milliGRAM(s) IV Intermittent once  milrinone Infusion 0.375 MICROgram(s)/kG/Min (14.8 mL/Hr) IV Continuous <Continuous>  naloxegol 25 milliGRAM(s) Oral daily  norepinephrine Infusion 0.3 MICROgram(s)/kG/Min (36.9 mL/Hr) IV Continuous <Continuous>  pantoprazole  Injectable 40 milliGRAM(s) IV Push daily  phenylephrine    Infusion 1.5 MICROgram(s)/kG/Min (36.9 mL/Hr) IV Continuous <Continuous>  polyethylene glycol 3350 17 Gram(s) Oral two times a day  propofol Infusion 15.001 MICROgram(s)/kG/Min (11.8 mL/Hr) IV Continuous <Continuous>  senna Syrup 10 milliLiter(s) Oral two times a day  trimethoprim  40 mG/sulfamethoxazole 200 mG Suspension 160 milliGRAM(s) Oral daily  vancomycin  IVPB 1750 milliGRAM(s) IV Intermittent every 12 hours  vasopressin Infusion 0.06 Unit(s)/Min (9 mL/Hr) IV Continuous <Continuous>    MEDICATIONS  (PRN):  dextrose Oral Gel 15 Gram(s) Oral once PRN Blood Glucose LESS THAN 70 milliGRAM(s)/deciliter

## 2023-09-14 NOTE — PHYSICAL THERAPY INITIAL EVALUATION ADULT - GENERAL OBSERVATIONS, REHAB EVAL
Pt received semi supine in bed in NAD, all lines intact + telemetry, IV, ecmo, mechanical vent, casey, HR 65, pts partner at bedside.

## 2023-09-14 NOTE — CONSULT NOTE ADULT - SUBJECTIVE AND OBJECTIVE BOX
HPI:  62 year old female with a past medical history of afib, and sciatica, who presented to [facility] for shortness of breath. She had gone to [facility] where she had CXR which showed bilateral lower lobe infiltrates so was sent to ER. She had been having exertional dyspnea and hypoxemia (on home pulse ox to 82%). She had been having dyspnea for several months and had a workup in [place] with a CT scan (which was negative for PE). She also states that she was seen by a pulmonologist and rheumatology, where they ruled out lupus and other immunological disorders.    [facility] Hospital Course  Found to be hypoxic and have interstitial lung disease appearance on CT, admitted 8/26 for AHRF, further ILD workup and management. TTE 8/26 with normal LVEF. Pt was started on prednisone on admission with gradually improving O2 requirement and has been stable on 2-3LNC since 9/3. Started steroid taper on 9/6 and fully transitioned to PO 9/8 overnight. Started on Mycophenolate mofetil (cellcept) 9/8 evening but pt reported subjective side effects with weakness. Episode of AF w RVR with HR up to 140s on 9/11 am, decreased to HR 10-110s with IV lopressor 10. CTA negative for PE and showed interval improvements in GGO but possible lingular bleb and RML bronchiectasis. Patient received 2L of but before more fluids and beta-blocker pt developed heart palpitations with EKG HR 170s with SVT. BPs 105/70s. Did not respond to vagal maneuvers. Pt given oral lopressor 12.5 and IV lopressor 5, with improvement of HR to 130s. SBP briefly dropped to 60-70s then recovered. Patient on NRB offered HFNC/BiPAP but refused. Started on empiric vancomycin and zosyn. BCx w/ NGTD. Per pulm increased solumedrol to 40mg qd. Patient acceded to HFNC in which she desatted and presented with increased wob for which she was transitioned to BiPAP. Patient with anxiety while on BiPAP presenting tachypnea and desatting to the 80s despite verbal redirection for which she was administered ativan 1mg IVP x1. Patient distress improved with sats in the low 90s but had desaturation to 70s. STAT CXR showed increased pulmonary congestion for which patient was administered lasix without improvement. Intubated and started on mechanical ventilation but required very high PEEP (18) and 100% FiO2 and still had low saturations. VV ECMO team consulted and accepted to [facility] center. Per signout patient had RV enlargement and dysfunction on POCUS with concern for either new PE vs high pulmonary pressures due to PEEP 18 and lung dysfunction. Patient went for cannulation at [facility] with flouro showing no acute PE. Cannulated with 25 F drainage cannula in R Fem V and 23F “arterial” cannula in RIJ.    (13 Sep 2023 15:24)    PERTINENT PM/SXH:       FAMILY HISTORY:    Family Hx substance abuse [ ]yes [ ]no  ITEMS NOT CHECKED ARE NOT PRESENT    SOCIAL HISTORY:   Significant other/partner[ x]  Children[ ]  Lutheran/Spirituality:  Substance hx:  [ ]   Tobacco hx:  [ ]   Alcohol hx: [ ]   Home Opioid hx:  [ ] I-Stop Reference No:  Living Situation: [x ]Home  [ ]Long term care  [ ]Rehab [ ]Other    ADVANCE DIRECTIVES:    DNR/MOLST  [ ]  Living Will  [ ]   DECISION MAKER(s):  [ ] Health Care Proxy(s)  [ x] Surrogate(s)  [ ] Guardian           Name(s): Phone Number(s):  Kiara Delgado #[phone] (partner)    BASELINE (I)ADL(s) (prior to admission):  Vega: [x ]Total  [ ] Moderate [ ]Dependent    Allergies    No Known Allergies    Intolerances    MEDICATIONS  (STANDING):  aMIOdarone Infusion 0.5 mG/Min (16.7 mL/Hr) IV Continuous <Continuous>  argatroban Infusion 0.1 MICROgram(s)/kG/Min (0.77 mL/Hr) IV Continuous <Continuous>  azithromycin  IVPB 500 milliGRAM(s) IV Intermittent every 24 hours  azithromycin  IVPB      calcium gluconate IVPB 2 Gram(s) IV Intermittent once  chlorhexidine 0.12% Liquid 15 milliLiter(s) Oral Mucosa every 12 hours  chlorhexidine 2% Cloths 1 Application(s) Topical <User Schedule>  cisatracurium Injectable 20 milliGRAM(s) IV Push once  dextrose 5%. 1000 milliLiter(s) (50 mL/Hr) IV Continuous <Continuous>  dextrose 5%. 1000 milliLiter(s) (100 mL/Hr) IV Continuous <Continuous>  dextrose 50% Injectable 25 Gram(s) IV Push once  dextrose 50% Injectable 12.5 Gram(s) IV Push once  dextrose 50% Injectable 25 Gram(s) IV Push once  fentaNYL    Injectable 100 MICROGram(s) IV Push once  glucagon  Injectable 1 milliGRAM(s) IntraMuscular once  HYDROmorphone Infusion. 1 mG/Hr (1 mL/Hr) IV Continuous <Continuous>  insulin lispro (ADMELOG) corrective regimen sliding scale   SubCutaneous every 6 hours  insulin NPH human recombinant 3 Unit(s) SubCutaneous every 6 hours  meropenem  IVPB      meropenem  IVPB 1000 milliGRAM(s) IV Intermittent every 8 hours  milrinone Infusion 0.375 MICROgram(s)/kG/Min (14.8 mL/Hr) IV Continuous <Continuous>  multivitamin  Chewable 1 Tablet(s) Chew daily  naloxegol 25 milliGRAM(s) Oral daily  norepinephrine Infusion 0.3 MICROgram(s)/kG/Min (36.9 mL/Hr) IV Continuous <Continuous>  pantoprazole  Injectable 40 milliGRAM(s) IV Push daily  phenylephrine    Infusion 1.5 MICROgram(s)/kG/Min (36.9 mL/Hr) IV Continuous <Continuous>  polyethylene glycol 3350 17 Gram(s) Oral two times a day  propofol Infusion 15.001 MICROgram(s)/kG/Min (11.8 mL/Hr) IV Continuous <Continuous>  senna Syrup 10 milliLiter(s) Oral two times a day  trimethoprim  40 mG/sulfamethoxazole 200 mG Suspension 160 milliGRAM(s) Oral daily  vancomycin  IVPB 1750 milliGRAM(s) IV Intermittent every 12 hours  vasopressin Infusion 0.06 Unit(s)/Min (9 mL/Hr) IV Continuous <Continuous>    MEDICATIONS  (PRN):  dextrose Oral Gel 15 Gram(s) Oral once PRN Blood Glucose LESS THAN 70 milliGRAM(s)/deciliter    PRESENT SYMPTOMS: [x ]Unable to self-report  [ x] CPOT [ ] PAINADs [ x] RDOS  Source if other than patient:  [ ]Family   [ ]Team     Pain: [ ]yes [ ]no  QOL impact -   Location -                    Aggravating factors -  Quality -  Radiation -  Timing-  Severity (0-10 scale):  Minimal acceptable level (0-10 scale):     CPOT:  0  https://www.[email or web address]/getattachment/[email or web address]/Critical-Care-Pain-Observation-Tool-(CPOT)    PAIN AD Score:   http://geriatrictoolkit.[facility]/cog/painad.pdf (press ctrl +  left click to view)    Dyspnea:                           [ ]Mild [ ]Moderate [ ]Severe      RDOS: 0  0 to 2  minimal or no respiratory distress   3  mild distress  4 to 6 moderate distress  >7 severe distress  https://homecareinformation.net/handouts/hen/Respiratory_Distress_Observation_Scale.pdf (Ctrl +  left click to view)     Anxiety:                             [ ]Mild [ ]Moderate [ ]Severe  Fatigue:                             [ ]Mild [ ]Moderate [ ]Severe  Nausea:                             [ ]Mild [ ]Moderate [ ]Severe  Loss of appetite:              [ ]Mild [ ]Moderate [ ]Severe  Constipation:                    [ ]Mild [ ]Moderate [ ]Severe    PCSSQ[Palliative Care Spiritual Screening Question]   Severity (0-10): deferred  Score of 4 or > indicate consideration of Chaplaincy referral.    Chaplaincy Referral: [ ] yes [ ] refused [ ] following [x] deferred    Other Symptoms:  [ ]All other review of systems negative     Palliative Performance Status Version 2: 10 %    http://[facility].org/files/news/palliative_performance_scale_ppsv2.pdf    PHYSICAL EXAM:  Vital Signs Last 24 Hrs  T(C): 35.4 (14 Sep 2023 12:00), Max: 35.9 (13 Sep 2023 20:00)  T(F): 95.8 (14 Sep 2023 12:00), Max: 96.6 (13 Sep 2023 20:00)  HR: 56 (14 Sep 2023 15:44) (51 - 124)  BP: --  BP(mean): --  RR: 12 (14 Sep 2023 15:00) (0 - 24)  SpO2: 98% (14 Sep 2023 15:44) (93% - 100%)    Parameters below as of 14 Sep 2023 15:00  Patient On (Oxygen Delivery Method): ventilator    O2 Concentration (%): 40 I&O's Summary    13 Sep 2023 07:01  -  14 Sep 2023 07:00  --------------------------------------------------------  IN: 2644.3 mL / OUT: 3230 mL / NET: -585.7 mL    14 Sep 2023 07:01  -  14 Sep 2023 16:01  --------------------------------------------------------  IN: 1006.6 mL / OUT: 400 mL / NET: 606.6 mL    GENERAL: [ ]Cachexia  [x] sedated  [ ]Alert  [ ]Oriented x   [ ]Lethargic  [ ]Unarousable  [ ]Verbal  [ ]Non-Verbal  Behavioral:   [ ] Anxiety  [ ] Delirium [ ] Agitation [ ] Other  HEENT:  [ ]Normal   [ ]Dry mouth   [x ]ET Tube/Trach  [ ]Oral lesions  PULMONARY:   [ ]Clear [ ]Tachypnea  [ ]Audible excessive secretions   [x ]Rhonchi        [ ]Right [ ]Left [ ]Bilateral  [ ]Crackles        [ ]Right [ ]Left [ ]Bilateral  [ ]Wheezing     [ ]Right [ ]Left [ ]Bilateral  [ ]Diminished breath sounds [ ]right [ ]left [ ]bilateral  CARDIOVASCULAR:    [ ]Regular [x ]Irregular [ ]Tachy  [ ]Everett [ ]Murmur [ ]Other  GASTROINTESTINAL:  [x ]Soft  [x ]Distended   [ x]+BS  [ x]Non tender [ ]Tender  [ ]Other [ ]PEG [x ]OGT/ NGT  Last BM:  GENITOURINARY:  [ ]Normal [ ] Incontinent   [ ]Oliguria/Anuria   [x ]Briseno  MUSCULOSKELETAL:   [ ]Normal   [ ]Weakness  [ ]Bed/Wheelchair bound [x ]Edema  NEUROLOGIC:   [ ]No focal deficits  [ ]Cognitive impairment  [ ]Dysphagia [ ]Dysarthria [ ]Paresis [ ]Other   SKIN:   [x ]Normal  [ ]Rash  [ ]Other  [ ]Pressure ulcer(s)       Present on admission [ ]y [ ]n    CRITICAL CARE:  [ ] Shock Present  [ ]Septic [ ]Cardiogenic [ ]Neurologic [ ]Hypovolemic  [ ]  Vasopressors [ ]  Inotropes   [ ]Respiratory failure present [ ]Mechanical ventilation [ ]Non-invasive ventilatory support [ ]High flow  Mode: AC/ CMV (Assist Control/ Continuous Mandatory Ventilation), RR (machine): 12, FiO2: 40, PEEP: 12, ITime: 1, MAP: 15, PC: 12, PIP: 24  [ ]Acute  [ ]Chronic [ ]Hypoxic  [ ]Hypercarbic [ ]Other  [ ]Other organ failure     LABS:                        11.3   5.47  )-----------( 71       ( 14 Sep 2023 13:05 )             32.7   09-14    132<L>  |  94<L>  |  36<H>  ----------------------------<  356<H>  4.1   |  21<L>  |  0.77    Ca    7.8<L>      14 Sep 2023 13:05  Phos  3.0     09-14  Mg     2.20     09-14    TPro  4.4<L>  /  Alb  3.1<L>  /  TBili  4.4<H>  /  DBili  x   /  AST  584<H>  /  ALT  565<H>  /  AlkPhos  66  09-14  PT/INR - ( 14 Sep 2023 13:05 )   PT: 42.4 sec;   INR: 3.94 ratio       PTT - ( 14 Sep 2023 15:21 )  PTT:82.8 sec    Urinalysis Basic - ( 14 Sep 2023 13:05 )    Color: x / Appearance: x / SG: x / pH: x  Gluc: 356 mg/dL / Ketone: x  / Bili: x / Urobili: x   Blood: x / Protein: x / Nitrite: x   Leuk Esterase: x / RBC: x / WBC x   Sq Epi: x / Non Sq Epi: x / Bacteria: x    RADIOLOGY & ADDITIONAL STUDIES:  < from: CT Chest w/ IV Cont (09.14.23 @ 10:38) >  IMPRESSION:  Airway secretions and large bilateral airspace consolidation, new since   9/11/2023. Small bilateral pleural effusions.  Interval ECMO placement.  Trace abdominopelvic ascites.    < from: CT Abdomen and Pelvis w/ IV Cont (09.14.23 @ 10:50) >  IMPRESSION:  Airway secretions and large bilateral airspace consolidation, new since   9/11/2023. Small bilateral pleural effusions.  Interval ECMO placement.  Trace abdominopelvic ascites.    PROTEIN CALORIE MALNUTRITION PRESENT: [ ]mild [ ]moderate [ ]severe [ ]underweight [ ]morbid obesity  https://www.andeal.org/vault/2440/web/files/ONC/Table_Clinical%20Characteristics%20to%20Document%20Malnutrition-White%20JV%20et%20al%202012.pdf    Height (cm): 172.7 (09-13-23 @ 15:32), 172.7 (09-13-23 @ 08:43)  Weight (kg): 127.6 (09-13-23 @ 17:00), 131.1 (09-13-23 @ 08:43)  BMI (kg/m2): 42.8 (09-13-23 @ 17:00), 44 (09-13-23 @ 15:32), 44 (09-13-23 @ 08:43)    [ ]PPSV2 < or = to 30% [ ]significant weight loss  [ ]poor nutritional intake  [ ]anasarca[ ]Artificial Nutrition      Other REFERRALS:  [ ]Hospice  [ ]Child Life  [ ]Social Work  [ ]Case management [ ]Holistic Therapy     Goals of Care Document: BRICE Pulliam (09-14-23 @ 15:41)  Goals of Care Conversation:   Participants:  · Participants  Family; Staff  · Spouse  Kiara Arvizulon  · Provider  Dr. Metz  ·   Ember Pulliam    Advance Directives:  · Does patient have Advance Directive  Yes  · Indicate Type  Health Care Proxy (HCP)  · Agent's Name  Kiara Delgado  · Phone #  [phone]  · Caregiver:  no    Conversation Discussion:  · Conversation  Diagnosis; Treatment Options  · Conversation Details  Referral to palliative care team for complex decision making and symptom management in setting of VV ECMO. Pt was transferred from [facility] for ECMO.   Introduced palliative care team as an extra layer of support for the patient and family to provide an opportunity for the family to share their understanding/concerns regarding their loved one on the ECMO circuit. The goal is to elicit their understanding of the complexities of this form of life support, ensuring that the families have appropriate insight and information. Assessed family's support system and provided information regarding resources for support including: chaplaincy, child life, and caregiver support if necessary. Met with pt's partner Kiara at bedside. She shared that she has been partners with Zo for 26 years. Kiara appears to understand  Zo's medical complexities. She is being realistic while maintaining hope that Zo's medical course will improve and hopes she will be able to come off the ECMO circuit. She recognizes that her partner is in a critical state receiving the highest form of life support.  Kiara was appreciative of palliative care support.   Caregiver  referral made; Kiara was open and receptive to additional support.    Location of Discussion:   Location of Discussion:  · Location of discussion  Face to face      Electronic Signatures:  Ember Pulliam ([facility])  (Signed 14-Sep-2023 15:55)  	Authored: Goals of Care Conversation, Location of Discussion  Anamika Alicea)  (Signature Pending)  	Co-Signer: Goals of Care Conversation, Location of Discussion      Last Updated: 14-Sep-2023 15:55 by Ember Pulliam ([facility])

## 2023-09-14 NOTE — DIETITIAN INITIAL EVALUATION ADULT - ENTERAL
--- Initiate VitalHP @ 10mL/hr then, as tolerated, increase by 10mL q6hrs to goal of 35mL/hr x 24hrs continuously + NoCarb Prosource 4 packets daily     will provide:  840mL total volume  840 kcals (+ Propofol KCals)  73g protein (+ 60g additional protein via NoCarb Prosource)= 133g total protein   702mL free water

## 2023-09-14 NOTE — PROGRESS NOTE ADULT - ASSESSMENT
This is a...     Neurology  AA&Ox3 at baseline   -c/w Sedation Propfol, Fentanyl, versed, Precedex  -medically paralyzed with cisatracurium   -PT and OT evaluation in place     Respiratory  Acute hypoxemic respiratory 2/2... intubated on...  requiring VV ECMO  -Bronch performed..... follow up BAL cultures  -VDR settings Conv. rate:     PIP:    PEEP:   Percussive Rate:         FiO2:  -Current settings: AC, PC, PS...  -Emergency settings:   -Ppeak:     Pplat:     DP:     Stress index:      ECMO  VV ECMO		  Cannulation sites/dates:  Flow:		P1:		Delta P:  RPM:		P2:		SVO2:  Sweep:        L/min:		FIO2:      ECMO Calculated CO:  DO2/VO2:  VO2/DO2:    -Clinically perfusing. Lactate:   -ECMO sweep sighing q1hr  - Adjust circuit flow as tolerated with DO2:VO2 goal >2  -Monitor for hemolysis, chatter, evidence of recirculation, mixing      Cardiovascular  Shock state requiring pressors   -c/w norepinephrine, phenylyephrine, vasopressin titrate to MAP>65 wean as tolerated   -POCUS  -LV vs RV     Lines  -Ecmo Cannulas  -TLC  -Hallie     Gastrointestinal   -tube feeds  -Bowel regimen     /Renal  ROSE    baseline creatine....  -CRRT, hemoconcentrator, diuresis     Infectious disease   cultures  antibiotics   COVID-19 treatment     HEME/PPX  -argatroban @  -Monitor for hemolysis LDH haptoglobin   -PLTs  -Heparin, argatroban GTT (goal PTT 50-70)    Endocrine  -A1c   -sliding scale FS Q       DISPO/ETHICS/GOC  -Palliative consult   -code status    This is a...     Neurology  AA&Ox3 at baseline   - continue sedation with propfol and dilaudid i/s/o severe hypoxemic respiratory failure   - low threshold to paralyze if dysynchronus,   - received cisatracurium IVP given for procedures yesterday    #Weakness  -Unclear etiology, likely myositis since pt has been having worsening weakness with movement of extremities vs Cellcept s/e. Symptoms improving subjectively.  -follow up with Dr. Nik Marie or Dante Urbano for EMG upon discharge    #Sciatica  BL hand and arm numbness ?2/2 to radiculopathy. Patient states that numbness in the BL UE has been increasing due to sleeping on hospital mattress.   Home meds prednisone 20mg BID, gabapentin 100g TID.   - PT consult 8/29 needed no additional PT needs. Seen by neuro at [facility]   -PT and OT evaluation in place     Respiratory  Acute hypoxemic respiratory 2/2... intubated on...  requiring VV ECMO  -Bronch performed..... follow up BAL cultures  -VDR settings Conv. rate:     PIP:    PEEP:   Percussive Rate:         FiO2:  -Current settings: AC, PC, PS...  -Emergency settings:   -Ppeak:     Pplat:     DP:     Stress index:      ECMO  VV ECMO		  Cannulation sites/dates:  Flow:		P1:		Delta P:  RPM:		P2:		SVO2:  Sweep:        L/min:		FIO2:      ECMO Calculated CO:  DO2/VO2:  VO2/DO2:    -Clinically perfusing. Lactate:   -ECMO sweep sighing q1hr  - Adjust circuit flow as tolerated with DO2:VO2 goal >2  -Monitor for hemolysis, chatter, evidence of recirculation, mixing      Cardiovascular  Shock state requiring pressors   -c/w norepinephrine, phenylyephrine, vasopressin titrate to MAP>65 wean as tolerated   -POCUS  -LV vs RV     Lines  -Ecmo Cannulas  -TLC  -Filion     Gastrointestinal   -tube feeds  -Bowel regimen     /Renal  ROSE    baseline creatine....  -CRRT, hemoconcentrator, diuresis     Infectious disease   cultures  antibiotics   COVID-19 treatment     HEME/PPX  -argatroban @  -Monitor for hemolysis LDH haptoglobin   -PLTs  -Heparin, argatroban GTT (goal PTT 50-70)    Endocrine  -A1c   -sliding scale FS Q       DISPO/ETHICS/GOC  -Palliative consult   -code status    This is a...     Neurology  AA&Ox3 at baseline   - continue sedation with propfol and dilaudid i/s/o severe hypoxemic respiratory failure   - low threshold to paralyze if dysynchronus,   - received cisatracurium IVP given for procedures yesterday    #Weakness  -Unclear etiology, likely myositis since pt has been having worsening weakness with movement of extremities vs Cellcept s/e. Symptoms improving subjectively.  -follow up with Dr. Nik Marie or Dante Urbano for EMG upon discharge    #Sciatica  BL hand and arm numbness ?2/2 to radiculopathy. Patient states that numbness in the BL UE has been increasing due to sleeping on hospital mattress.   Home meds prednisone 20mg BID, gabapentin 100g TID.   - PT consult 8/29 needed no additional PT needs. Seen by neuro at [facility]   -PT and OT evaluation in place     Respiratory  Acute hypoxemic respiratory 2/2... intubated on...  requiring VV ECMOHis course was further complicated by worsening hypoxemic/hypercapnic respiratory failure requiring VV ECMO and transfer to [facility]  -Bronch performed..... follow up BAL cultures  -VDR settings Conv. rate:     PIP:    PEEP:   Percussive Rate:         FiO2:  -Current settings: AC, PC, PS...  -Emergency settings:   -Ppeak:     Pplat:     DP:     Stress index:      ECMO  VV ECMO		  Cannulation sites/dates:  Flow:		P1:		Delta P:  RPM:		P2:		SVO2:  Sweep:        L/min:		FIO2:      ECMO Calculated CO:  DO2/VO2:  VO2/DO2:    -Clinically perfusing. Lactate:   -ECMO sweep sighing q1hr  - Adjust circuit flow as tolerated with DO2:VO2 goal >2  -Monitor for hemolysis, chatter, evidence of recirculation, mixing      Cardiovascular  Shock state requiring pressors   -c/w norepinephrine, phenylyephrine, vasopressin titrate to MAP>65 wean as tolerated   -POCUS  -LV vs RV     Lines  -Ecmo Cannulas  -TLC  -Traci     Gastrointestinal   -tube feeds  -Bowel regimen     /Renal  ROSE    baseline creatine....  -CRRT, hemoconcentrator, diuresis     Infectious disease   cultures  antibiotics   COVID-19 treatment     HEME/PPX  -argatroban @  -Monitor for hemolysis LDH haptoglobin   -PLTs  -Heparin, argatroban GTT (goal PTT 50-70)    Endocrine  -A1c   -sliding scale FS Q       DISPO/ETHICS/GOC  -Palliative consult   -code status      PULMONARY  #Acute on Chronic Hypoxemic Respiratory Failure  #ARDS  # VV ECMO:  - setup: R fem 25F. 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  #NSIP  #DAH   - s/p bronch with serial BAL worsening bloody appearance  - f/u bronch results  - Rheum consult  - timing of worsening appears coincident with weaning steroids  - currently being pulsed with 1g solumedrol - first dose on 9/13    #R/O Pneumonia  - Vanc, donald, azithro  - f/u BAL cultures, urine strep and legionella  - monitor QTc while on azithro    CARDIOVASCULAR  #Tachyarrhythmia  #AF RVR  -c/w amio load  -CT PE negative for PE  -LE duplex negative for DVT but will repeat  -wean down levophed    #Concern for Right Heart dysfunction  #Mild pHTN  -Likely i/s/o hypoxic vasoconstriction and high PEEP  -Had been on milrinone PTA now off  -No PE seen on invasive pulmonary angiography at time of ECMO cannulation  -Currently improved on VV ECMO – off inotropes  -wean off NO2 as above  -f/u official echo    #Distributive Shock  -likely septic with component of vaspoplegia i/s/o sedation, possible contribution of cardiogenic from RV dysfunction  -c/w levo, marianne, vaso - increase marianne to come down on levo    RENAL  # Hyponatremia  -f/u urine lytes  -overall making good urine    GASTROINTESTINAL  # Nutrition  - start tube feeds tomorrow    #Transaminase elevation  - likely due to shock liver  - if not improving can consider pulling back pull cannula    INFECTIOUS DISEASE  # Empiric antibiotics (dosed per ECMO)  - vancomycin  - donald  - azithro    # PJP ppx  -Bactrim DS     ENDOCRINE  # Hyperglycemia i/s/o steroids  -SSI  -Admission A1c 6.1      HEME  # ECMO anticoagulation  -argatroban gtt goal aPTT 50-70    #R/O DVT  - f/u duplex    MSK  #Critical illness polymyoneuropathy  - PT once off paralytics, coming down on sedation and begining SAT/SBTs    FLUIDS/ELECTROLYTES/NUTRITION  # Diet: NPO with TF  # IVF: albumin PRN  # Monitor, Replete to K>4, Phos>3, Mg>2, iCa>1  # Transfuse for Hgb <7, Plt<10      PROPHYLAXIS  # DVT: argatroban  # GI: TF    DISPO: ICU  CODE STATUS: Full        This is a... His course was further complicated by worsening hypoxemic/hypercapnic respiratory failure requiring VV ECMO and transfer to [facility]      Neurology  AA&Ox3 at baseline   - continue sedation with propfol and dilaudid i/s/o severe hypoxemic respiratory failure   - low threshold to paralyze if dysynchronus,   - received cisatracurium IVP given for procedures yesterday    #Weakness  -Unclear etiology, likely myositis since pt has been having worsening weakness with movement of extremities vs Cellcept s/e. Symptoms improving subjectively.  -follow up with Dr. Nik Marie or Dante Urbano for EMG upon discharge    #Sciatica  BL hand and arm numbness ?2/2 to radiculopathy. Patient states that numbness in the BL UE has been increasing due to sleeping on hospital mattress.   Home meds prednisone 20mg BID, gabapentin 100g TID.   - PT consult 8/29 needed no additional PT needs. Seen by neuro at [facility]   -PT and OT evaluation in place     Respiratory  Acute hypoxemic respiratory failure 2/2 bacterial PNA vs atypical PNA vs aspiration vs AEILD. Intubated on 9/13  requiring VV ECMO. -Bronch performed..... follow up BAL cultures  -Current settings: AC, PC, PS...  -Emergency settings:   -Ppeak:     Pplat:     DP:     Stress index:      ECMO  VV ECMO		  Cannulation sites/dates:  Flow:		P1:		Delta P:  RPM:		P2:		SVO2:  Sweep:        L/min:		FIO2:      ECMO Calculated CO:  DO2/VO2:  VO2/DO2:    -Clinically perfusing. Lactate:   -ECMO sweep sighing q1hr  - Adjust circuit flow as tolerated with DO2:VO2 goal >2  -Monitor for hemolysis, chatter, evidence of recirculation, mixing      Cardiovascular  Shock state requiring pressors   -c/w norepinephrine, phenylyephrine, vasopressin titrate to MAP>65 wean as tolerated   -POCUS  -LV vs RV     Lines  -Ecmo Cannulas  -TLC  -Traci     Gastrointestinal   -tube feeds  -Bowel regimen     /Renal  ROSE    baseline creatine....  -CRRT, hemoconcentrator, diuresis     Infectious disease   cultures  antibiotics   COVID-19 treatment     HEME/PPX  -argatroban @  -Monitor for hemolysis LDH haptoglobin   -PLTs  -Heparin, argatroban GTT (goal PTT 50-70)    Endocrine  -A1c   -sliding scale FS Q       DISPO/ETHICS/GOC  -Palliative consult   -code status       66 y/o male PMH HTN, ETOH abuse who presented to [facility] on 12/15 for fall at home, he was initially treated for rhabdo and ETOH withdrawal but developed acute respiratory failure 2/2 legionella pneumonia requiring intubation on 12/16. His course was further complicated by worsening hypoxemic/hypercapnic respiratory failure requiring VV ECMO and transfer to [facility] on 12/21 requiring VDR therapy for 12/23-12/29 with serial bronchoscopies in the setting copious thick secretions, now improved and decannulated on 12/29.      Neurology  AA&Ox3 at baseline now with critical illness myopathy.   -CTH 12/15 at OSH negative for hemorrhage or infarct repeat on 1/6 negative    -CTH 1/6 shows involutional and microvascular ischemic-type changes, no intracranial hemorrhage or large vessel occlusion  -Confused likely ICU delirium, c/w haldol 5mg IVP PRN agitation  -c/w seroquel 25mg QHS for sleep disturbance  -c/w aggressive PT and OT      Respiratory  Acute hypoxemic/hypercapnic respiratory 2/2 legionella pneumonia, intubated on 12/16 requiring VV ECMO on 12/21, course of VDR 12/23-12/29 with improvements in secretions and was successfully de-cannulated on 12/29 and extubated 1/6  -B/L dense consolidations with irregular pleura with small B/L pleural effusions  -s/p VDR therapy to promote airway clearance 12/23-12/29   -serial bronchoscopies performed for airway clearance throughout ECMO course   -Extubated on1/6 to face tent, now on room air w/02 sat >97%  -airway clearance to facilitate secretion mobilization, continue duoneb and 3% nacl inhalation      Cardiovascular  Hx of HTN, episode of new onset rapid afib with RVR at OSH, started on heparin gtt now in shock state requiring low dose pressors most likely vasoplegic in the setting sedation now with bradycardia  requiring intermittent low dose pressors  Home dose : Norvasc 10mg daily  -off norepinephrine since 1/3  -holding off EP at this time, if still bradycardiac off sedation, will consider consulting   -can start norvasc 5mg PO daily, can uptitrate to home dose of 10mg if remains hypertensive  -cortisol level 12/23 11.5 and started on Hydrocortisone  on 12/23 now tapering down slowly  -bedside RITCHIE LVOT 2.5     Skin/Lines  -Ecmo Cannulas Right IJ/Right Fem 12/21-12/29  -Left IJ TLC 12/21-12/30  -left fem Traci d/c'd 12/30  -Acute urticaria- infx vs meds -hx of pcn allergy w/ macular rash to inner thigh and outer thigh region as well has right chest resolved s/p benadryl 25mg q8 x48 hrs (12/22-12/24)  -concern for possible lyme dz? given rash appearance but all are negative, now resolved     Gastrointestinal   Transaminitis  -NGT with high output, dark/coffee ground in appearance, unlikely UGIB. Hgb remains stable.   -Tolerating trickle feeds, can increase to goal rate   -continue PPI for now   -LFT's elevated but stable, will continue to monitor   -s/p RUQ US 12/26 showed enlarged fatty liver only if LFT's resolving   -frequent loose stools, rectal tube in place  -continue bowel regimen senna and miralax  -cine esophogram? for possible dysphagia following prolonged intubation once more stable/alert.   -nutrition following      /Renal  ROSE /rhabdo ([place] [address]) resolved, continues to be hypernatremic   -Creatinine uptrended most likely in the setting of hypotension vs obstruction given large amount of urine in bladder on US on 1/2    -casey reinserted, in place making good UO will attempt TOV   -Hypernatremia likely 2/2 free fluid loss/hypovolemia s/p fluids now resolving     Infectious disease   Legionella positive now febrile post ECMO decannulation (12/29) with no leukocytosis   -s/p Levaquin for ~9-10 day course (12/19-12/28)  -s/p Meropenem (12/21-26) iso ? rash but had rash prior when on cefepime  -vancomycin discontinued, MRSA PCR negative   -empiric Cefepime (1/1-1/4) discontinued in setting of elevated LFT's and cultures thus far currently NGTD, will continue to monitor   -ID following     HEME/PPX  -was initially on heparin for new onset rapid afib at OSH was changed to argatroban while on ECMO circuit    -VA duplex 12/30 shows non-occlusive thrombosis right common femoral artery   -d/c argatroban gtt can transition to lovenox 100mg SQ BID for anticoagulation,   -elevated INR in the setting of argatroban gtt and ?malnutrition vs ETOH liver dysfunction - now resolved  -B12 and folate to assess for b12 deficiency anemia iso tremors/involuntary shaking- wnl    Endocrine  No hx DM a1c 5.2   -currently on ISS increased to moderate sliding scale in the setting of steroids   -TSH 0.21 (low normal), FT4 0.5 (low), TT3 36 (low), concern for secondary hypothyroidism?  -c/w Levothyroxine IV 20mcg,  can change to levothyroxine 25 mcg PO daily once tolerating tube feeds,  repeat FT4 and TSH on 1/6 WNL,  -cortisol level 11.5, low in relation to severity of stress and illness started on Hydrocortisone 100mg x1 12/23 now on steroids taper, IVP 20 mg AM and 10 mg in PM can switch to PO when tolerated  -once stable can hold PM dose of hydrocortisone and check 8AM cortisol the following day to reassess HPA axis  -prolactin 45.2 (elevated) and LH 2.7 (low) concerning for pituitary adenoma, MRI sella once stable   -Endocrine following        DISPO/ETHICS/GOC  -Palliative care following   -full code   -brother Barron and sister in law involved in care          NEUROLOGY  AA&Ox3 at baseline   - continue sedation with propfol and dilaudid i/s/o severe hypoxemic respiratory failure   - low threshold to paralyze if dysynchronus,   - received cisatracurium IVP given for procedures yesterday    #Weakness  -Unclear etiology, likely myositis since pt has been having worsening weakness with movement of extremities vs Cellcept s/e. Symptoms improving subjectively.  -follow up with Dr. Nik Marie or Dante Urbano for EMG upon discharge    #Sciatica  Home meds: prednisone 20mg BID, gabapentin 100g TID  B/l hand and arm numbness ?2/2 to radiculopathy. Patient states that numbness in the BL UE has been increasing due to sleeping on hospital mattress.   - PT consult     PULMONARY  Acute hypoxemic respiratory failure 2/2 bacterial PNA vs atypical PNA vs aspiration vs AEILD. Intubated on 9/13  requiring VV ECMO.  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    -Bronch performed..... follow up BAL cultures  -Current settings: PC  RR 12, PS 12, PEEP 12 Fi02 80% Ppeak:   -Emergency settings:   - currently on NO2 40ppm, will wean while watching RV    ECMO  VV ECMO		  Cannulation sites/dates: 9/13/23 R fem 25F. RIJ 19F   Flow: 3.86		P1: 154		Delta P: 23  RPM: 2800		P2: 132		SVO2: 83  Sweep:   4     L/min:		FIO2:   100  #Acute on Chronic Hypoxemic Respiratory Failure  #ARDS  # VV ECMO:    ECMO Calculated CO:  DO2/VO2: .15  VO2/DO2: 6.4    -Clinically perfusing. Lactate: downtrending 7.4 > 6.1  - ECMO sweep sighing q1hr  - Adjust circuit flow as tolerated with DO2:VO2 goal >2  - Monitor for hemolysis, chatter, evidence of recirculation,         #NSIP  #DAH   - s/p bronch with serial BAL worsening bloody appearance, appears to be coincident with weaning steroids  - f/u bronch results        #R/O Pneumonia  - Vanc, donald, azithro  - f/u BAL cultures, urine strep and legionella  - monitor QTc while on azithro    CARDIOVASCULAR  #Tachyarrhythmia  #AF RVR  -c/w amio load  -CT PE negative for PE  -LE duplex negative for DVT but will repeat  -wean down levophed    #Concern for Right Heart dysfunction  #Mild pHTN  -Likely i/s/o hypoxic vasoconstriction and high PEEP  -Had been on milrinone PTA now off  -No PE seen on invasive pulmonary angiography at time of ECMO cannulation  -Currently improved on VV ECMO – off inotropes  -wean off NO2 as above  -f/u official echo    #Distributive Shock  -likely septic with component of vaspoplegia i/s/o sedation, possible contribution of cardiogenic from RV dysfunction  -c/w levo, roosevelt, vaso - increase roosevelt to come down on levo    RENAL  # Hyponatremia  -f/u urine lytes  -overall making good urine    GASTROINTESTINAL  # Nutrition  - start tube feeds tomorrow    #Transaminase elevation  - likely due to shock liver  - if not improving can consider pulling back pull cannula    INFECTIOUS DISEASE  # Empiric antibiotics (dosed per ECMO)  - vancomycin  - donald  - azithro    # PJP ppx  -Bactrim DS     ENDOCRINE  # Hyperglycemia i/s/o steroids  -SSI  -Admission A1c 6.1      HEME  # ECMO anticoagulation  -argatroban gtt goal aPTT 50-70    #R/O DVT  - f/u duplex    MSK  #Critical illness polymyoneuropathy  - PT once off paralytics, coming down on sedation and begining SAT/SBTs      RHEUM  #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 Findings are nonspecific but differential etiologies include interstitial pneumonia, drug toxicity, nonspecific connective tissue disorders.  - OSH labs show strongly positive ARIANA, RNP, and anti smith antibodies with low C4 and normal C3. Patient with negative SSa and SSb, negative DsDNa,  - Rheum consult  - s/p 1g solumedrol - first dose on 9/13, plan for 1gm dose of solumedrol again today 9/14, and 1gm dose tomorrow 9/15  Ferritin lupus anticoagulant, DRRVT, Anti B2 glycoprotein and anticardiolipin IgG, IgM, C3, C4.       FLUIDS/ELECTROLYTES/NUTRITION  # Diet: NPO with TF  # IVF: albumin PRN  # Monitor, Replete to K>4, Phos>3, Mg>2, iCa>1  # Transfuse for Hgb <7, Plt<10      PROPHYLAXIS  # DVT: argatroban  # GI: TF    DISPO: ICU  CODE STATUS: Full        This is a... His course was further complicated by worsening hypoxemic/hypercapnic respiratory failure requiring VV ECMO and transfer to [facility]   -PT and OT evaluation in place     Respiratory        mixing      Cardiovascular  Hx of Afib w at OSH, started on Amiodarone gtt now in shock state requiring low dose pressors most likely vasoplegic in the setting sedation, now with bradycardia   Shock state requiring pressors   -c/w norepinephrine, will wean off Roosevelt to MAP>65 as tolerated and continue Vaso while still on NO2  -POCUS  -LV vs RV     Lines  -Ecmo Cannulas  -TLC  -Traci     Gastrointestinal   -tube feeds  -Bowel regimen     /Renal  ROSE    baseline creatine....  -CRRT, hemoconcentrator, diuresis     Infectious disease   cultures  antibiotics   COVID-19 treatment     HEME/PPX  -argatroban @  -Monitor for hemolysis LDH haptoglobin   -PLTs  -Heparin, argatroban GTT (goal PTT 50-70)    Endocrine  -A1c   -sliding scale FS Q       DISPO/ETHICS/GOC  -Palliative consult   -code status       64 y/o male PMH HTN, ETOH abuse who presented to [facility] on 12/15 for fall at home, he was initially treated for rhabdo and ETOH withdrawal but developed acute respiratory failure 2/2 legionella pneumonia requiring intubation on 12/16. His course was further complicated by worsening hypoxemic/hypercapnic respiratory failure requiring VV ECMO and transfer to [facility] on 12/21 requiring VDR therapy for 12/23-12/29 with serial bronchoscopies in the setting copious thick secretions, now improved and decannulated on 12/29.      Neurology  AA&Ox3 at baseline now with critical illness myopathy.   -CTH 12/15 at OSH negative for hemorrhage or infarct repeat on 1/6 negative    -CTH 1/6 shows involutional and microvascular ischemic-type changes, no intracranial hemorrhage or large vessel occlusion  -Confused likely ICU delirium, c/w haldol 5mg IVP PRN agitation  -c/w seroquel 25mg QHS for sleep disturbance  -c/w aggressive PT and OT      Respiratory  Acute hypoxemic/hypercapnic respiratory 2/2 legionella pneumonia, intubated on 12/16 requiring VV ECMO on 12/21, course of VDR 12/23-12/29 with improvements in secretions and was successfully de-cannulated on 12/29 and extubated 1/6  -B/L dense consolidations with irregular pleura with small B/L pleural effusions  -s/p VDR therapy to promote airway clearance 12/23-12/29   -serial bronchoscopies performed for airway clearance throughout ECMO course   -Extubated on1/6 to face tent, now on room air w/02 sat >97%  -airway clearance to facilitate secretion mobilization, continue duoneb and 3% nacl inhalation      Cardiovascular  Hx of HTN, episode of new onset rapid afib with RVR at OSH, started on heparin gtt now in shock state requiring low dose pressors most likely vasoplegic in the setting sedation now with bradycardia  requiring intermittent low dose pressors  Home dose : Norvasc 10mg daily  -off norepinephrine since 1/3  -holding off EP at this time, if still bradycardiac off sedation, will consider consulting   -can start norvasc 5mg PO daily, can uptitrate to home dose of 10mg if remains hypertensive  -cortisol level 12/23 11.5 and started on Hydrocortisone  on 12/23 now tapering down slowly  -bedside RITCHIE LVOT 2.5     Skin/Lines  -Ecmo Cannulas Right IJ/Right Fem 12/21-12/29  -Left IJ TLC 12/21-12/30  -left fem Moriarty d/c'd 12/30  -Acute urticaria- infx vs meds -hx of pcn allergy w/ macular rash to inner thigh and outer thigh region as well has right chest resolved s/p benadryl 25mg q8 x48 hrs (12/22-12/24)  -concern for possible lyme dz? given rash appearance but all are negative, now resolved     Gastrointestinal   Transaminitis  -NGT with high output, dark/coffee ground in appearance, unlikely UGIB. Hgb remains stable.   -Tolerating trickle feeds, can increase to goal rate   -continue PPI for now   -LFT's elevated but stable, will continue to monitor   -s/p RUQ US 12/26 showed enlarged fatty liver only if LFT's resolving   -frequent loose stools, rectal tube in place  -continue bowel regimen senna and miralax  -cine esophogram? for possible dysphagia following prolonged intubation once more stable/alert.   -nutrition following      /Renal  ROSE /rhabdo ([place] peaked [place] [address]) resolved, continues to be hypernatremic   -Creatinine uptrended most likely in the setting of hypotension vs obstruction given large amount of urine in bladder on US on 1/2    -casey reinserted, in place making good UO will attempt TOV   -Hypernatremia likely 2/2 free fluid loss/hypovolemia s/p fluids now resolving     Infectious disease   Legionella positive now febrile post ECMO decannulation (12/29) with no leukocytosis   -s/p Levaquin for ~9-10 day course (12/19-12/28)  -s/p Meropenem (12/21-26) iso ? rash but had rash prior when on cefepime  -vancomycin discontinued, MRSA PCR negative   -empiric Cefepime (1/1-1/4) discontinued in setting of elevated LFT's and cultures thus far currently NGTD, will continue to monitor   -ID following     HEME/PPX  -was initially on heparin for new onset rapid afib at OSH was changed to argatroban while on ECMO circuit    -VA duplex 12/30 shows non-occlusive thrombosis right common femoral artery   -d/c argatroban gtt can transition to lovenox 100mg SQ BID for anticoagulation,   -elevated INR in the setting of argatroban gtt and ?malnutrition vs ETOH liver dysfunction - now resolved  -B12 and folate to assess for b12 deficiency anemia iso tremors/involuntary shaking- wnl    Endocrine  No hx DM a1c 5.2   -currently on ISS increased to moderate sliding scale in the setting of steroids   -TSH 0.21 (low normal), FT4 0.5 (low), TT3 36 (low), concern for secondary hypothyroidism?  -c/w Levothyroxine IV 20mcg,  can change to levothyroxine 25 mcg PO daily once tolerating tube feeds,  repeat FT4 and TSH on 1/6 WNL,  -cortisol level 11.5, low in relation to severity of stress and illness started on Hydrocortisone 100mg x1 12/23 now on steroids taper, IVP 20 mg AM and 10 mg in PM can switch to PO when tolerated  -once stable can hold PM dose of hydrocortisone and check 8AM cortisol the following day to reassess HPA axis  -prolactin 45.2 (elevated) and LH 2.7 (low) concerning for pituitary adenoma, MRI sella once stable   -Endocrine following        DISPO/ETHICS/GOC  -Palliative care following   -full code   -brother Barron and sister in law involved in care        His course was further complicated by worsening hypoxemic/hypercapnic respiratory failure requiring VV ECMO a        NEUROLOGY  AA&Ox3 at baseline   - continue sedation with propfol and dilaudid i/s/o severe hypoxemic respiratory failure   - low threshold to paralyze if dysynchronus,   - received cisatracurium IVP given for procedures yesterday    #Weakness  -Unclear etiology, likely myositis since pt has been having worsening weakness with movement of extremities vs Cellcept s/e. Symptoms improving subjectively.  -follow up with Dr. Nik Marie or Dante Urbano for EMG upon discharge    Home meds: prednisone 20mg BID, gabapentin 100g TID  B/l hand and arm numbness ?2/2 to radiculopathy. Patient states that numbness in the BL UE has been increasing due to sleeping on hospital mattress.   - PT consult     PULMONARY  Acute hypoxemic respiratory failure 2/2 bacterial PNA vs atypical PNA vs aspiration vs AEILD. Intubated on 9/13  requiring VV ECMO.  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    -Bronch performed..... follow up BAL cultures  - Current settings: PC  RR 12, PS 12, PEEP 12 Fi02 80% Ppeak: 24 pulling TV ~40  - Emergency settings:   - Bronchoscopy showed mild thick yellow secretions in trachea, diffuse moderate thin green/brown secretions throughout  - airway clearance facilitate secretion mobilization  - c/w empiric abx   - f/u BAL cultures, urine strep and legionella      ECMO  VV ECMO		  Cannulation sites/dates: 9/13/23 R fem 25F. RIJ 19F   Flow: 3.86		P1: 154		Delta P: 23  RPM: 2800		P2: 132		SVO2: 83  Sweep:   4     L/min:		FIO2:   100  #Acute on Chronic Hypoxemic Respiratory Failure  #ARDS  # VV ECMO:    ECMO Calculated CO:  DO2/VO2: .15  VO2/DO2: 6.4    -Clinically perfusing. Lactate: downtrending 7.4 > 6.1  - ECMO sweep sighing q1hr  - Adjust circuit flow as tolerated with DO2:VO2 goal >2  - Monitor for hemolysis, chatter, evidence of recirculation,       CARDIOVASCULAR  #Tachyarrhythmia  #AF RVR  #Distributive Shock  Hx of Afib w at OSH, started on Amiodarone gtt now in shock state requiring low dose pressors most likely vasoplegic in the setting sedation, now with bradycardia   Shock state requiring pressors   - likely septic with component of vaspoplegia i/s/o sedation, possible contribution of cardiogenic from RV dysfunction  - c/w norepinephrine, will wean off [place] today maintaining map >65 and continue Vasopressin for now while on NO2  - discontinue amio gtt iso bradycardia  - POCUS  - CT PE negative for PE    #Concern for Right Heart dysfunction  #Mild pHTN  - Likely i/s/o hypoxic vasoconstriction and high PEEP  - s/p milrinone gtt, d/c'd 9/13  - No PE seen on invasive pulmonary angiography at time of ECMO cannulation  - currently on NO2 40ppm, will wean while watching RV function  - f/u official echo      Lines  -Ecmo Cannulas  -TLC-   -[place] -         RENAL  -Creatinine uptrended most likely in the setting of hypotension vs obstruction given large amount of urine in bladder on US on 1/2    -casey reinserted, in place making good UO will attempt TOV       GASTROINTESTINAL  # Nutrition  #Transaminase elevation  - LFT's elevated, now slightly downtrending, however T.bili is high and uptrending, likely 2/2 combination of shock liver and malposition of ECMO cannula, if not improving can consider pulling back ECMO cannula  - NGT in place, can start tube feeds and increase to goal rate as tolerated  - continue PPI iso steroids   - continue bowel regimen senna and miralax and naloxogel to avoid opioid induced constipation  - nutrition following    INFECTIOUS DISEASE  No leukocytosis, afebrile  - c/w empiric donald (9/13- ) vancomycin (9/14- ) pending mrsa pcr and azithro pending Ulegionella  - c/w Bactrim DS for PJP prophylaxis  - 9/13 UA negative  - f/u BAL cyto, cell count, cultures, PCP PCR, fungitell, aspergillus  - f/u blood and urine cultures, MRSA PCR, urine legionella. RVP negative      ENDOCRINE  # Hyperglycemia i/s/o steroids  Admission A1c 6.1  - blood glucose 200-300's  - hyperglycemia iso steroids   -       HEME  # ECMO anticoagulation  -argatroban gtt goal aPTT 50-70  - LE duplex negative for DVT but will repeat  #R/O DVT  - f/u duplex    MSK  #Critical illness polymyoneuropathy  - PT once off paralytics, coming down on sedation and begining SAT/SBTs      RHEUM  CT findings at OSH consistent with ILD   -CT findings, Improved/decreased extent of bilateral groundglass opacities and reticular markings compared to the previous study. Findings are nonspecific but differential etiologies include interstitial pneumonia, drug toxicity, nonspecific connective tissue disorders.  - OSH labs show strongly positive RAIANA, RNP, and anti smith antibodies with low C4 and normal C3. Patient with negative SSa and SSb, negative DsDNa,  - Rheum consult  - s/p 1g solumedrol - first dose on 9/13, plan for 1gm dose of solumedrol again today 9/14, and 1gm dose tomorrow 9/15  Ferritin lupus anticoagulant, DRRVT, Anti B2 glycoprotein and anticardiolipin IgG, IgM, C3, C4.       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 for acute hypoxemic and hypercapnic respiratory failure requiring mechanical ventilatory and VV-ECMO.         NEUROLOGY  AA&Ox3 at baseline   - continue sedation with propfol and dilaudid i/s/o severe hypoxemic respiratory failure   - low threshold to paralyze if dysynchronus,   - received cisatracurium IVP given for procedures yesterday    #Weakness  -Unclear etiology, likely myositis since pt has been having worsening weakness with movement of extremities vs Cellcept s/e. Symptoms improving subjectively.  -follow up with Dr. Nik Marie or Dante Urbano for EMG upon discharge    Home meds: prednisone 20mg BID, gabapentin 100g TID  B/l hand and arm numbness ?2/2 to radiculopathy. Patient states that numbness in the BL UE has been increasing due to sleeping on hospital mattress.   - PT consult     PULMONARY  Acute hypoxemic respiratory failure 2/2 bacterial PNA vs atypical PNA vs aspiration vs AEILD. Intubated on 9/13  requiring VV ECMO.  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    -Bronch performed..... follow up BAL cultures  - Current settings: PC  RR 12, PS 12, PEEP 12 Fi02 80% Ppeak: 24 pulling TV ~40  - Emergency settings:   - Bronchoscopy showed mild thick yellow secretions in trachea, diffuse moderate thin green/brown secretions throughout  - airway clearance facilitate secretion mobilization  - c/w empiric abx   - f/u BAL cultures, urine strep and legionella      ECMO  VV ECMO		  Cannulation sites/dates: 9/13/23 R fem 25F. RI 19F   Flow: 3.86		P1: 154		Delta P: 23  RPM: 2800		P2: 132		SVO2: 83  Sweep:   4     L/min:		FIO2:   100  #Acute on Chronic Hypoxemic Respiratory Failure  #ARDS  # VV ECMO:    ECMO Calculated CO:  DO2/VO2: .15  VO2/DO2: 6.4    -Clinically perfusing. Lactate: downtrending 7.4 > 6.1  - ECMO sweep sighing q1hr  - Adjust circuit flow as tolerated with DO2:VO2 goal >2  - Monitor for hemolysis, chatter, evidence of recirculation,       CARDIOVASCULAR  #Tachyarrhythmia  #AF RVR  #Distributive Shock  Hx of Afib w at OSH, started on Amiodarone gtt now in shock state requiring low dose pressors most likely vasoplegic in the setting sedation, now with bradycardia   Shock state requiring pressors   - likely septic with component of vaspoplegia i/s/o sedation, possible contribution of cardiogenic from RV dysfunction  - c/w norepinephrine, will wean off [place] today maintaining map >65 and continue Vasopressin for now while on NO2  - discontinue amio gtt iso bradycardia  - POCUS  - CT PE negative for PE    #Concern for Right Heart dysfunction  #Mild pHTN  - Likely i/s/o hypoxic vasoconstriction and high PEEP  - s/p milrinone gtt, d/c'd 9/13  - No PE seen on invasive pulmonary angiography at time of ECMO cannulation  - currently on NO2 40ppm, will wean while watching RV function  - f/u official echo      Lines  -Ecmo Cannulas  -TLC-   -[place] -         RENAL  -Creatinine uptrended most likely in the setting of hypotension vs obstruction given large amount of urine in bladder on US on 1/2    -casey reinserted, in place making good UO will attempt TOV       GASTROINTESTINAL  # Nutrition  #Transaminase elevation  - LFT's elevated, now slightly downtrending, however T.bili is high and uptrending, likely 2/2 combination of shock liver and malposition of ECMO cannula, if not improving can consider pulling back ECMO cannula  - NGT in place, can start tube feeds and increase to goal rate as tolerated  - continue PPI iso steroids   - continue bowel regimen senna and miralax and naloxogel to avoid opioid induced constipation  - nutrition following    INFECTIOUS DISEASE  No leukocytosis, afebrile  - c/w empiric donald (9/13- ) vancomycin (9/14- ) pending mrsa pcr and azithro pending Ulegionella  - c/w Bactrim DS for PJP prophylaxis  - 9/13 UA negative  - f/u BAL cyto, cell count, cultures, PCP PCR, fungitell, aspergillus  - f/u blood and urine cultures, MRSA PCR, urine legionella. RVP negative      ENDOCRINE  # Hyperglycemia i/s/o steroids  Admission A1c 6.1  - blood glucose 200-300's  - hyperglycemia iso steroids   -       HEME  # ECMO anticoagulation  -argatroban gtt goal aPTT 50-70  - LE duplex negative for DVT but will repeat  #R/O DVT  - f/u duplex    MSK  #Critical illness polymyoneuropathy  - PT once off paralytics, coming down on sedation and begining SAT/SBTs      RHEUM  CT findings at OSH consistent with ILD   -CT findings, Improved/decreased extent of bilateral groundglass opacities and reticular markings compared to the previous study. Findings are nonspecific but differential etiologies include interstitial pneumonia, drug toxicity, nonspecific connective tissue disorders.  - OSH labs show strongly positive ARIANA, RNP, and anti smith antibodies with low C4 and normal C3. Patient with negative SSa and SSb, negative DsDNa,  - Rheum consult  - s/p 1g solumedrol - first dose on 9/13, plan for 1gm dose of solumedrol again today 9/14, and 1gm dose tomorrow 9/15  Ferritin lupus anticoagulant, DRRVT, Anti B2 glycoprotein and anticardiolipin IgG, IgM, C3, C4.       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 for acute hypoxemic and hypercapnic respiratory failure requiring mechanical ventilatory and VV-ECMO.         NEUROLOGY  AA&Ox3 at baseline   - continue sedation with propfol and dilaudid i/s/o severe hypoxemic respiratory failure   - low threshold to paralyze if dysynchronus,   - received cisatracurium IVP given for procedures yesterday      #Weakness    Home meds: prednisone 20mg BID, gabapentin 100g TID  Patient states that numbness in the BL UE has been increasing due to sleeping on hospital mattress.   - PT consult     PULMONARY  Acute hypoxemic respiratory failure 2/2 bacterial PNA vs atypical PNA vs aspiration vs AEILD. Intubated on 9/13  requiring VV ECMO.  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    -Bronch performed..... follow up BAL cultures  - Current settings: PC  RR 12, PS 12, PEEP 12 Fi02 80% Ppeak: 24 pulling TV ~40  - Emergency settings:   - Bronchoscopy showed mild thick yellow secretions in trachea, diffuse moderate thin green/brown secretions throughout  - airway clearance facilitate secretion mobilization  - c/w empiric abx   - f/u BAL cultures, urine strep and legionella      ECMO  VV ECMO		  Cannulation sites/dates: 9/13/23 R fem 25F. RIJ 19F   Flow: 3.86		P1: 154		Delta P: 23  RPM: 2800		P2: 132		SVO2: 83  Sweep:   4     L/min:		FIO2:   100  #Acute on Chronic Hypoxemic Respiratory Failure  #ARDS  # VV ECMO:    ECMO Calculated CO:  DO2/VO2: .15  VO2/DO2: 6.4    -Clinically perfusing. Lactate: downtrending 7.4 > 6.1  - ECMO sweep sighing q1hr  - Adjust circuit flow as tolerated with DO2:VO2 goal >2  - Monitor for hemolysis, chatter, evidence of recirculation,       CARDIOVASCULAR  #Tachyarrhythmia  #AF RVR  #Distributive Shock  Hx of Afib w at OSH, started on Amiodarone gtt now in shock state requiring low dose pressors most likely vasoplegic in the setting sedation, now with bradycardia   Shock state requiring pressors   - likely septic with component of vaspoplegia i/s/o sedation, possible contribution of cardiogenic from RV dysfunction  - c/w norepinephrine, will wean off [place] today maintaining map >65 and continue Vasopressin for now while on NO2  - discontinue amio gtt iso bradycardia  - POCUS   - TTE 9/12 with EF 65-70% with normal LV and RV  - TTE shows EF 18%. Severe global LV systolic dysfunction. RV enlargement with decreased RV systolic function.    - CT PE negative for PE    #Concern for Right Heart dysfunction  #Mild pHTN  - Likely i/s/o hypoxic vasoconstriction and high PEEP  - s/p milrinone gtt, d/c'd 9/13  - No PE seen on invasive pulmonary angiography at time of ECMO cannulation  - currently on NO2 40ppm, will wean while watching RV function  - f/u official echo      Lines  -Ecmo Cannulas  -TLC-   -[place] -         RENAL  -Creatinine uptrended most likely in the setting of hypotension vs obstruction given large amount of urine in bladder on US on 1/2    -casey reinserted, in place making good UO will attempt TOV       GASTROINTESTINAL  # Nutrition  #Transaminase elevation  - LFT's elevated, now slightly downtrending, however T.bili is high and uptrending, likely 2/2 combination of shock liver and malposition of ECMO cannula, if not improving can consider pulling back ECMO cannula  - NGT in place, can start tube feeds and increase to goal rate as tolerated  - continue PPI iso steroids   - continue bowel regimen senna and miralax and naloxogel to avoid opioid induced constipation  - nutrition following    INFECTIOUS DISEASE  No leukocytosis, afebrile  - c/w empiric donald (9/13- ) vancomycin (9/14- ) pending mrsa pcr and azithro pending Ulegionella  - c/w Bactrim DS for PJP prophylaxis  - 9/13 UA negative  - f/u BAL cyto, cell count, cultures, PCP PCR, fungitell, aspergillus  - f/u blood and urine cultures, MRSA PCR, urine legionella. RVP negative      ENDOCRINE  # Hyperglycemia i/s/o steroids  Admission A1c 6.1  - blood glucose 200-300's  - hyperglycemia iso steroids   -       HEME  # ECMO anticoagulation  -argatroban gtt goal aPTT 50-70  - LE duplex negative for DVT but will repeat  #R/O DVT  - f/u duplex    MSK  - c/o B/l hand and arm weakness and numbness ?2/2 to radiculopathy.   - Unclear etiology, likely myositis since pt has been having worsening weakness with movement of extremities vs Cellcept s/e. Symptoms improving subjectively.  - follow up with Dr. Nik Marie or Dante Urbano for EMG upon discharge      RHEUM  - CT findings at OSH consistent with ILD   - CT findings, Improved/decreased extent of bilateral groundglass opacities and reticular markings compared to the previous study. Findings are nonspecific but differential etiologies include interstitial pneumonia, drug toxicity, nonspecific connective tissue disorders.  - OSH labs show strongly positive ARIANA, RNP, and anti smith antibodies with low C4 and normal C3. Patient with negative SSa and SSb, negative DsDNa,  - Rheum consult  - s/p 1g solumedrol - first dose on 9/13, plan for 1gm dose of solumedrol again today 9/14, and 1gm dose tomorrow 9/15  Ferritin lupus anticoagulant, DRRVT, Anti B2 glycoprotein and anticardiolipin IgG, IgM, C3, C4.       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         NEUROLOGY  AA&Ox3 at baseline   - continue sedation with propfol and dilaudid i/s/o severe hypoxemic respiratory failure   - low threshold to paralyze if dysynchronus,   - received cisatracurium IVP given for procedures yesterday      #Weakness    Home meds: prednisone 20mg BID, gabapentin 100g TID  Patient states that numbness in the BL UE has been increasing due to sleeping on hospital mattress.   - PT consult     PULMONARY  Acute hypoxemic respiratory failure 2/2 bacterial PNA vs atypical PNA vs aspiration vs AEILD. Intubated on 9/13  requiring VV ECMO.  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    -Bronch performed..... follow up BAL cultures  - Current settings: PC  RR 12, PS 12, PEEP 12 Fi02 80% Ppeak: 24 pulling TV ~40  - Emergency settings:   - Bronchoscopy showed mild thick yellow secretions in trachea, diffuse moderate thin green/brown secretions throughout  - airway clearance facilitate secretion mobilization  - c/w empiric abx   - f/u BAL cultures, urine strep and legionella      ECMO  VV ECMO		  Cannulation sites/dates: 9/13/23 R fem 25F. RIJ 19F   Flow: 3.86		P1: 154		Delta P: 23  RPM: 2800		P2: 132		SVO2: 83  Sweep:   4     L/min:		FIO2:   100  #Acute on Chronic Hypoxemic Respiratory Failure  #ARDS  # VV ECMO:    ECMO Calculated CO:  DO2/VO2: .15  VO2/DO2: 6.4    -Clinically perfusing. Lactate: downtrending 7.4 > 6.1  - ECMO sweep sighing q1hr  - Adjust circuit flow as tolerated with DO2:VO2 goal >2  - Monitor for hemolysis, chatter, evidence of recirculation,       CARDIOVASCULAR  #Tachyarrhythmia  #AF RVR  #Distributive Shock  Hx of Afib w at OSH, started on Amiodarone gtt now in shock state requiring low dose pressors most likely vasoplegic in the setting sedation, now with bradycardia   Shock state requiring pressors   - likely septic with component of vaspoplegia i/s/o sedation, possible contribution of cardiogenic from RV dysfunction  - c/w norepinephrine, will wean off [place] today maintaining map >65 and continue Vasopressin for now while on NO2  - discontinue amio gtt iso bradycardia  - TTE 9/12 with EF 65-70% with normal LV and RV  - TTE shows EF 18%. Severe global LV systolic dysfunction. RV enlargement with decreased RV systolic function  - POCUS:  Normal LVSF. RV systolic function improved from yesterday  - CT PE negative for PE    #Concern for Right Heart dysfunction  #Mild pHTN  - Likely i/s/o hypoxic vasoconstriction and high PEEP  - s/p milrinone gtt, d/c'd 9/13  - No PE seen on invasive pulmonary angiography at time of ECMO cannulation  - currently on NO2 40ppm, will wean while watching RV function        Lines  -Ecmo Cannulas  -TLC-   -[place] -         RENAL  -Creatinine uptrended most likely in the setting of hypotension vs obstruction given large amount of urine in bladder on US on 1/2    -casey reinserted, in place making good UO will attempt TOV       GASTROINTESTINAL  # Nutrition  #Transaminase elevation  - LFT's elevated, now slightly downtrending, however T.bili is high and uptrending, likely 2/2 combination of shock liver and malposition of ECMO cannula, if not improving can consider pulling back ECMO cannula  - NGT in place, can start tube feeds and increase to goal rate as tolerated  - continue PPI iso steroids   - continue bowel regimen senna and miralax and naloxogel to avoid opioid induced constipation  - nutrition following    INFECTIOUS DISEASE  No leukocytosis, afebrile  - c/w empiric donald (9/13- ) vancomycin (9/14- ) pending mrsa pcr and azithro pending Ulegionella  - c/w Bactrim DS for PJP prophylaxis  - 9/13 UA negative  - f/u BAL cyto, cell count, cultures, PCP PCR, fungitell, aspergillus  - f/u blood and urine cultures, MRSA PCR, urine legionella. RVP negative      ENDOCRINE  # Hyperglycemia i/s/o steroids  Admission A1c 6.1  - blood glucose 200-300's  - hyperglycemia iso steroids   -       HEME  # ECMO anticoagulation  -argatroban gtt goal aPTT 50-70  - LE duplex negative for DVT but will repeat  #R/O DVT  - f/u duplex    MSK  - c/o B/l hand and arm weakness and numbness ?2/2 to radiculopathy.   - Unclear etiology, likely myositis since pt has been having worsening weakness with movement of extremities vs Cellcept s/e. Symptoms improving subjectively.  - follow up with Dr. Nik Marie or Dante Urbano for EMG upon discharge      RHEUM  - CT findings at OSH consistent with ILD   - CT findings, Improved/decreased extent of bilateral groundglass opacities and reticular markings compared to the previous study. Findings are nonspecific but differential etiologies include interstitial pneumonia, drug toxicity, nonspecific connective tissue disorders.  - OSH labs show strongly positive ARIANA, RNP, and anti smith antibodies with low C4 and normal C3. Patient with negative SSa and SSb, negative DsDNa,  - Rheum consult  - s/p 1g solumedrol - first dose on 9/13, plan for 1gm dose of solumedrol again today 9/14, and 1gm dose tomorrow 9/15  Ferritin lupus anticoagulant, DRRVT, Anti B2 glycoprotein and anticardiolipin IgG, IgM, C3, C4.       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    Patient was intially admitted to [facility] on 8/26 after p/w SOB and found to be hypoxemic. Of note, as per chart review and wife, patient has had SOB  for several months. They stayed in [place] from 11/2022 to 8/20/2023 and while there, patient noted onset of chronic SOB several months ago. Also endorse onset of debilitating b/l hand cramps and some muscles weakness several months as well. Patient went to see a neurologist and had an MRI which reportedly showed cervical spine issues for which she was recently started on Prednisone shortly before admission. Also endorses single episode of painful rash on her shins, ears and neck and chest which resolved with a steroid cream from a dermatologist.     During admission at [facility], patient underwent bronchoscopy with TBBX on 8/30 which showed NSIP/OP. Labs notable for positive ARIANA 1:1280, RNP > 8, Alejandro > 8. Patient was started on Solumedrol 60mg q6h from 9/1 to 9/6 then weaned over time to then Medrol 32 mg 9/9 to 9/12. Patient was also on Cellcept 9/8 to 9/11 but was stopped due to tachycardia. During this time, patient was on 2 to 4 L nc. Interval CTA chest on 9/11 also showed improved in reticular findings and diffuse GGO. On 9/12 early AM, patient had episode of SVT to 170s with then rapidly progressive hypoxemic respiratory failure leading to intubation this AM 9/13. Patient was restarted on empiric abx with Vanc and Zosyn on 9/12. Despite best practices, patient remamined hypoxemic and patient was cannulated for VV-ECMO on 9/13 and transferred to [facility].        Patient is a 63 yo F w/ Afib admitted to [facility] on 8/26 after p/w SOB and found to be hypoxemic. patient has had SOB  for several months. as well as cdebilitating b/l hand cramps and some muscles weakness several months as well. Patient went to see a neurologist and had an MRI which reportedly showed cervical spine issues for which she was recently started on Prednisone shortly before admission. Also endorses single episode of painful rash on her shins, ears and neck and chest which resolved with a steroid cream from a dermatologist.     /io onset of and recently diagnosed Mixed Connective Tissue Disease, associated ILD (NSIP pattern)   who was transferred today to [facility] from [facility] for acute hypoxemic and hypercapnic respiratory failure requiring mechanical ventilatory and VV-ECMO.         NEUROLOGY  AA&Ox3 at baseline   - continue sedation with propfol and dilaudid i/s/o severe hypoxemic respiratory failure   - low threshold to paralyze if dysynchronus,   - received cisatracurium IVP given for procedures yesterday      #Weakness    Home meds: prednisone 20mg BID, gabapentin 100g TID  Patient states that numbness in the BL UE has been increasing due to sleeping on hospital mattress.   - PT consult     PULMONARY  Acute hypoxemic respiratory failure 2/2 bacterial PNA vs atypical PNA vs aspiration vs AEILD. Intubated on 9/13  requiring VV ECMO.  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    -Bronch performed..... follow up BAL cultures  - Current settings: PC  RR 12, PS 12, PEEP 12 Fi02 80% Ppeak: 24 pulling TV ~40  - Emergency settings:   - Bronchoscopy showed mild thick yellow secretions in trachea, diffuse moderate thin green/brown secretions throughout  - airway clearance facilitate secretion mobilization  - c/w empiric abx   - f/u BAL cultures, urine strep and legionella      ECMO  VV ECMO		  Cannulation sites/dates: 9/13/23 R fem 25F. RIJ 19F   Flow: 3.86		P1: 154		Delta P: 23  RPM: 2800		P2: 132		SVO2: 83  Sweep:   4     L/min:		FIO2:   100  #Acute on Chronic Hypoxemic Respiratory Failure  #ARDS  # VV ECMO:    ECMO Calculated CO:  DO2/VO2: .15  VO2/DO2: 6.4    -Clinically perfusing. Lactate: downtrending 7.4 > 6.1  - ECMO sweep sighing q1hr  - Adjust circuit flow as tolerated with DO2:VO2 goal >2  - Monitor for hemolysis, chatter, evidence of recirculation,       CARDIOVASCULAR  #Tachyarrhythmia  #AF RVR  #Distributive Shock  Hx of Afib w at OSH, started on Amiodarone gtt now in shock state requiring low dose pressors most likely vasoplegic in the setting sedation, now with bradycardia   Shock state requiring pressors   - likely septic with component of vaspoplegia i/s/o sedation, possible contribution of cardiogenic from RV dysfunction  - c/w norepinephrine, will wean off Roosevelt today maintaining map >65 and continue Vasopressin for now while on NO2  - discontinue amio gtt iso bradycardia  - TTE 9/12 with EF 65-70% with normal LV and RV  - TTE shows EF 18%. Severe global LV systolic dysfunction. RV enlargement with decreased RV systolic function  - POCUS:  Normal LVSF. RV systolic function improved from yesterday  - CT PE negative for PE    #Concern for Right Heart dysfunction  #Mild pHTN  - Likely i/s/o hypoxic vasoconstriction and high PEEP  - s/p milrinone gtt, d/c'd 9/13  - No PE seen on invasive pulmonary angiography at time of ECMO cannulation  - currently on NO2 40ppm, will wean while watching RV function        Lines  -Ecmo Cannulas  -TLC-   -Traci -         RENAL  -Creatinine uptrended most likely in the setting of hypotension vs obstruction given large amount of urine in bladder on US on 1/2    -casey reinserted, in place making good UO will attempt TOV       GASTROINTESTINAL  # Nutrition  #Transaminase elevation  - LFT's elevated, now slightly downtrending, however T.bili is high and uptrending, likely 2/2 combination of shock liver and malposition of ECMO cannula, if not improving can consider pulling back ECMO cannula  - NGT in place, can start tube feeds and increase to goal rate as tolerated  - continue PPI iso steroids   - continue bowel regimen senna and miralax and naloxogel to avoid opioid induced constipation  - nutrition following    INFECTIOUS DISEASE  No leukocytosis, afebrile  - c/w empiric donald (9/13- ) vancomycin (9/14- ) pending mrsa pcr and azithro pending Ulegionella  - c/w Bactrim DS for PJP prophylaxis  - 9/13 UA negative  - f/u BAL cyto, cell count, cultures, PCP PCR, fungitell, aspergillus  - f/u blood and urine cultures, MRSA PCR, urine legionella. RVP negative      ENDOCRINE  # Hyperglycemia i/s/o steroids  Admission A1c 6.1  - blood glucose 200-300's  - hyperglycemia iso steroids   -       HEME  # ECMO anticoagulation  -argatroban gtt goal aPTT 50-70  - LE duplex negative for DVT but will repeat  #R/O DVT  - f/u duplex    MSK  - c/o B/l hand and arm weakness and numbness ?2/2 to radiculopathy.   - Unclear etiology, likely myositis since pt has been having worsening weakness with movement of extremities vs Cellcept s/e. Symptoms improving subjectively.  - follow up with Dr. Nik Marie or Dante Urbano for EMG upon discharge      RHEUM  - CT findings at OSH consistent with ILD   - CT findings, Improved/decreased extent of bilateral groundglass opacities and reticular markings compared to the previous study. Findings are nonspecific but differential etiologies include interstitial pneumonia, drug toxicity, nonspecific connective tissue disorders.  - OSH labs show strongly positive ARIANA, RNP, and anti smith antibodies with low C4 and normal C3. Patient with negative SSa and SSb, negative DsDNa,  - Rheum consult  - s/p 1g solumedrol - first dose on 9/13, plan for 1gm dose of solumedrol again today 9/14, and 1gm dose tomorrow 9/15  Ferritin lupus anticoagulant, DRRVT, Anti B2 glycoprotein and anticardiolipin IgG, IgM, C3, C4.       FLUIDS/ELECTROLYTES/NUTRITION  # Diet: NPO with TF  # IVF: albumin PRN  # Monitor, Replete to K>4, Phos>3, Mg>2, iCa>1  # Transfuse for Hgb <7, Plt<10      PROPHYLAXIS  # DVT: argatroban  # GI: TF    DISPO: ICU  CODE STATUS: Full       63 yo F w/ Afib initially admitted to [facility] on 8/26 after p/w SOB and found to be hypoxemic, reported having  SOB as well as debilitating b/l hand cramps and some muscles weakness several months.  Underwent bronchoscopy with TBBX on 8/30 which showed Non-Specific Intersitial Disease (NSIP)/OP. Labs notable for positive ARIANA 1:1280, RNP > 8, Alejandro > 8. Patient was started on steroids and recieved cellcept, which was discontinued 2/2 tachycardia. Interval CTA chest on 9/11 also showed improved in reticular findings and diffuse GGO,  course c/b episode of SVT to 170s w/ rapidly progressive hypoxemic respiratory failure leading to intubation  9/13. Patient was restarted on empiric abx with Vanc and Zosyn on 9/12. Despite best practices, patient remamined hypoxemic and patient was cannulated for VV-ECMO on 9/13 and transferred to [facility].      NEUROLOGY  Home meds: gabapentin 100g TID  AA&Ox3 at baseline   - continue sedation with propfol and dilaudid i/s/o severe hypoxemic respiratory failure   - low threshold to paralyze if dysynchronus,   - received cisatracurium IVP given for procedures yesterday  - [facility] 9/14 no acute findings\  - Palliative following  - PT consult     PULMONARY  Acute hypoxemic respiratory failure 2/2 bacterial PNA vs atypical PNA vs aspiration vs AEILD. Intubated on 9/13  requiring VV ECMO.  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    -Bronch performed..... follow up BAL cultures  - Current settings: PC  RR 12, PS 12, PEEP 12 Fi02 80% Ppeak: 24 pulling TV ~40  - Emergency settings:   - Bronchoscopy showed mild thick yellow secretions in trachea, diffuse moderate thin green/brown secretions throughout  - airway clearance facilitate secretion mobilization  - c/w empiric abx   - f/u BAL cultures, urine strep and legionella      ECMO  VV ECMO		  Cannulation sites/dates: 9/13/23 R fem 25F. RI 19F   Flow: 3.86		P1: 154		Delta P: 23  RPM: 2800		P2: 132		SVO2: 83  Sweep:   4     L/min:		FIO2:   100  #Acute on Chronic Hypoxemic Respiratory Failure  #ARDS  # VV ECMO:    ECMO Calculated CO:  DO2/VO2: .15  VO2/DO2: 6.4    -Clinically perfusing. Lactate: downtrending 7.4 > 6.1  - ECMO sweep sighing q1hr  - Adjust circuit flow as tolerated with DO2:VO2 goal >2  - Monitor for hemolysis, chatter, evidence of recirculation,       CARDIOVASCULAR  #Tachyarrhythmia  #AF RVR  #Distributive Shock  Hx of Afib w at OSH, started on Amiodarone gtt now in shock state requiring low dose pressors most likely vasoplegic in the setting sedation, now with bradycardia   Shock state requiring pressors   - likely septic with component of vaspoplegia i/s/o sedation, possible contribution of cardiogenic from RV dysfunction  - c/w norepinephrine, will wean off Roosevelt today maintaining map >65 and continue Vasopressin for now while on NO2  - discontinue amio gtt iso bradycardia  - TTE 9/12 with EF 65-70% with normal LV and RV  - TTE shows EF 18%. Severe global LV systolic dysfunction. RV enlargement with decreased RV systolic function  - POCUS:  Normal LVSF. RV systolic function improved from yesterday  - CT PE negative for PE    #Concern for Right Heart dysfunction  #Mild pHTN  - Likely i/s/o hypoxic vasoconstriction and high PEEP  - s/p milrinone gtt, d/c'd 9/13  - No PE seen on invasive pulmonary angiography at time of ECMO cannulation  - currently on NO2 40ppm, will wean while watching RV function        Lines  -Ecmo Cannulas  -LIJ TLC- 9/13 (placed at OSH)  -Left Axill Traci - 9/13 (placed at OSH)        RENAL  sCr baseline 0.78  - Creatinine wnl, 0.77 today   - indwelling catheter in place, making good UO 40-100ml/hr  - monitor i/o's, positive +2.5L LOS/ negative -670ml 24hrs  - monitor, Replete to K>4, Phos>3, Mg>2, iCa>1      GASTROINTESTINAL  # Nutrition  #Transaminase elevation  - LFT's elevated, now slightly downtrending, however T.bili is high and uptrending, likely 2/2 combination of shock liver and malposition of ECMO cannula, if not improving can consider pulling back ECMO cannula  - NGT in place, can start tube feeds and increase to goal rate as tolerated  - continue PPI iso steroids   - continue bowel regimen senna and miralax and naloxogel to avoid opioid induced constipation  - nutrition following    INFECTIOUS DISEASE  No leukocytosis, afebrile  - c/w empiric donald (9/13- ) vancomycin (9/14- ) pending mrsa pcr and azithro pending Ulegionella  - c/w Bactrim DS for PJP prophylaxis  - 9/13 UA negative  - f/u BAL cyto, cell count, cultures, PCP PCR, fungitell, aspergillus  - f/u blood and urine cultures, MRSA PCR, urine legionella. RVP negative      ENDOCRINE  # Hyperglycemia i/s/o steroids  Admission A1c 6.1  - blood glucose 200-300's  - hyperglycemia iso steroids   - can add NPH and sliding scale  - monitor closely and adjust as needed      HEME  # ECMO anticoagulation  - continue argatroban gtt goal aPTT 50-70  - INR elevated likely iso argatroban, however will give dose Vit. K  - Hgb stable 11.3  - Transfuse for Hgb <7, Plt<10    #R/O DVT  - LE duplex negative for DVT but will repeat  - f/u duplex    MSK  - c/o B/l hand and arm weakness and numbness ?2/2 to radiculopathy, unnclear etiology, likely myositis since pt has been having worsening weakness with movement of extremities vs Cellcept s/e. Symptoms improving subjectively.  - f/u with Dr. Nik Marie or Dante Urbano for EMG upon discharge (osh?)      RHEUM  - CT findings at OSH consistent with ILD   - CT findings, Improved/decreased extent of bilateral groundglass opacities and reticular markings compared to the previous study. Findings are nonspecific but differential etiologies include interstitial pneumonia, drug toxicity, nonspecific connective tissue disorders.  - OSH labs show strongly positive ARIANA, RNP, and anti smith antibodies with low C4 and normal C3. Patient with negative SSa and SSb, negative DsDNa,  - Rheum consult  - s/p 1g solumedrol - first dose on 9/13, plan for 1gm dose of solumedrol again today 9/14, and 1gm dose tomorrow 9/15  Ferritin lupus anticoagulant, DRRVT, Anti B2 glycoprotein and anticardiolipin IgG, IgM, C3, C4.       PROPHYLAXIS  # DVT: argatroban  # GI: TF      GOC  -Palliative Following    DISPO: ICU  CODE STATUS: Full       63 yo F w/ Afib initially admitted to [facility] on 8/26 after p/w SOB and found to be hypoxemic, reported having  SOB as well as debilitating b/l hand cramps and some muscles weakness several months.  Underwent bronchoscopy with TBBX on 8/30 which showed Non-Specific Intersitial Disease (NSIP)/OP. Labs notable for positive ARIANA 1:1280, RNP > 8, Alejandro > 8. Patient was started on steroids and recieved cellcept, which was discontinued 2/2 tachycardia. Interval CTA chest on 9/11 also showed improved in reticular findings and diffuse GGO,  course c/b episode of SVT to 170s w/ rapidly progressive hypoxemic respiratory failure leading to intubation  9/13. Patient was restarted on empiric abx with Vanc and Zosyn on 9/12. Despite best practices, patient remamined hypoxemic and patient was cannulated for VV-ECMO on 9/13 and transferred to [facility].      NEUROLOGY  Home meds: gabapentin 100g TID  AA&Ox3 at baseline   - continue sedation with propfol and dilaudid i/s/o severe hypoxemic respiratory failure   - low threshold to paralyze if dysynchronus,   - received cisatracurium IVP given for procedures yesterday  - [facility] 9/14 no acute findings\  - Palliative following  - PT consult     PULMONARY  Acute hypoxemic respiratory failure 2/2 bacterial PNA vs atypical PNA vs aspiration vs AEILD. Intubated on 9/13  requiring VV ECMO.  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    -Bronch performed..... follow up BAL cultures  - Current settings: PC  RR 12, PS 12, PEEP 12 Fi02 80% Ppeak: 24 pulling TV ~40  - Emergency settings:   - Bronchoscopy showed mild thick yellow secretions in trachea, diffuse moderate thin green/brown secretions throughout  - airway clearance facilitate secretion mobilization  - c/w empiric abx   - f/u BAL cultures, urine strep and legionella      ECMO  VV ECMO		  Cannulation sites/dates: 9/13/23 R fem 25F. RIJ 19F   Flow: 3.86		P1: 154		Delta P: 23  RPM: 2800		P2: 132		SVO2: 83  Sweep:   4     L/min:		FIO2:   100  #Acute on Chronic Hypoxemic Respiratory Failure  #ARDS  # VV ECMO:    ECMO Calculated CO:  DO2/VO2: .15  VO2/DO2: 6.4    - right groin cannula pulled-back ~3-4cm to position tip of cannula just inferior to hepatic vein.  -Clinically perfusing. Lactate: downtrending 7.4 > 6.1  - ECMO sweep sighing q1hr  - Adjust circuit flow as tolerated with DO2:VO2 goal >2  - Monitor for hemolysis, chatter, evidence of recirculation,       CARDIOVASCULAR  #Tachyarrhythmia  #AF RVR  #Distributive Shock  Hx of Afib w at OSH, started on Amiodarone gtt now in shock state requiring low dose pressors most likely vasoplegic in the setting sedation, now with bradycardia   Shock state requiring pressors   - likely septic with component of vaspoplegia i/s/o sedation, possible contribution of cardiogenic from RV dysfunction  - c/w norepinephrine, will wean off Roosevelt today maintaining map >65 and continue Vasopressin for now while on NO2  - discontinue amio gtt iso bradycardia  - TTE 9/12 with EF 65-70% with normal LV and RV  - TTE shows EF 18%. Severe global LV systolic dysfunction. RV enlargement with decreased RV systolic function  - POCUS:  Normal LVSF. RV systolic function improved from yesterday  - CT PE negative for PE    #Concern for Right Heart dysfunction  #Mild pHTN  - Likely i/s/o hypoxic vasoconstriction and high PEEP  - s/p milrinone gtt, d/c'd 9/13  - No PE seen on invasive pulmonary angiography at time of ECMO cannulation  - currently on NO2 40ppm, will wean while watching RV function      Lines  -Ecmo Cannulas  -LIJ TLC- 9/13 (placed at OSH)  -Left Axill Traci - 9/13 (placed at OSH)      RENAL  sCr baseline 0.78  - Creatinine wnl, 0.77 today   - indwelling catheter in place, making good UO 40-100ml/hr  - monitor i/o's, positive +2.5L LOS/ negative -670ml 24hrs  - monitor, Replete to K>4, Phos>3, Mg>2, iCa>1      GASTROINTESTINAL  # Nutrition  #Transaminase elevation  - LFT's elevated, now slightly downtrending, however T.bili is high and uptrending, likely 2/2 combination of shock liver and malposition of ECMO cannula, if not improving can consider pulling back ECMO cannula  - NGT in place, can start tube feeds and increase to goal rate as tolerated  - continue PPI iso steroids   - continue bowel regimen senna and miralax and naloxogel to avoid opioid induced constipation  - nutrition following    INFECTIOUS DISEASE  No leukocytosis, afebrile  - c/w empiric donald (9/13- ) vancomycin (9/14- ) pending mrsa pcr and azithro pending Ulegionella  - c/w Bactrim DS for PJP prophylaxis  - 9/13 UA negative  - f/u BAL cyto, cell count, cultures, PCP PCR, fungitell, aspergillus  - f/u blood and urine cultures, MRSA PCR, urine legionella. RVP negative      ENDOCRINE  # Hyperglycemia i/s/o steroids  Admission A1c 6.1  - blood glucose 200-300's  - hyperglycemia iso steroids   - can add NPH and sliding scale  - monitor closely and adjust as needed      HEME  # ECMO anticoagulation  - continue argatroban gtt goal aPTT 50-70  - INR elevated likely iso argatroban, however will give dose Vit. K  - Hgb stable 11.3  - Transfuse for Hgb <7, Plt<10    #R/O DVT  - LE duplex negative for DVT but will repeat  - f/u duplex    MSK  - c/o B/l hand and arm weakness and numbness ?2/2 to radiculopathy, unnclear etiology, likely myositis since pt has been having worsening weakness with movement of extremities vs Cellcept s/e. Symptoms improving subjectively.  - f/u with Dr. Nik Marie or Dante Urbano for EMG upon discharge (osh?)      RHEUM  - CT findings at OSH consistent with ILD   - CT findings, Improved/decreased extent of bilateral groundglass opacities and reticular markings compared to the previous study. Findings are nonspecific but differential etiologies include interstitial pneumonia, drug toxicity, nonspecific connective tissue disorders.  - OSH labs show strongly positive ARIANA, RNP, and anti smith antibodies with low C4 and normal C3. Patient with negative SSa and SSb, negative DsDNa,  - Rheum consult  - s/p 1g solumedrol - first dose on 9/13, plan for 1gm dose of solumedrol again today 9/14, and 1gm dose tomorrow 9/15  Ferritin lupus anticoagulant, DRRVT, Anti B2 glycoprotein and anticardiolipin IgG, IgM, C3, C4.       PROPHYLAXIS  # DVT: argatroban  # GI: TF      GOC  -Palliative Following    DISPO: ICU  CODE STATUS: Full       63 yo F w/ Afib initially admitted to [facility] on 8/26 after p/w SOB and found to be hypoxemic, reported having  SOB as well as debilitating b/l hand cramps and some muscles weakness several months.  Underwent bronchoscopy with TBBX on 8/30 which showed Non-Specific Intersitial Disease (NSIP)/OP. Labs notable for positive ARIANA 1:1280, RNP > 8, Alejandro > 8. Patient was started on steroids and recieved cellcept, which was discontinued 2/2 tachycardia. Interval CTA chest on 9/11 also showed improved in reticular findings and diffuse GGO,  course c/b episode of SVT to 170s w/ rapidly progressive hypoxemic respiratory failure leading to intubation  9/13. Patient was restarted on empiric abx with Vanc and Zosyn on 9/12. Despite best practices, patient remamined hypoxemic and patient was cannulated for VV-ECMO on 9/13 and transferred to [facility].      NEUROLOGY  Home meds: gabapentin 100g TID  AA&Ox3 at baseline   - continue sedation with propfol and dilaudid i/s/o severe hypoxemic respiratory failure   - low threshold to paralyze if dysynchronus,   - received cisatracurium IVP given for procedures yesterday  - [facility] 9/14 no acute findings\  - Palliative following  - PT consult     PULMONARY  Acute hypoxemic respiratory failure 2/2 bacterial PNA vs atypical PNA vs aspiration vs AEILD. Intubated on 9/13  requiring VV ECMO.  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    -Bronch performed..... follow up BAL cultures  - Current settings: PC  RR 12, PS 12, PEEP 12 Fi02 80% Ppeak: 24 pulling TV ~40  - Emergency settings:   - Bronchoscopy showed mild thick yellow secretions in trachea, diffuse moderate thin green/brown secretions throughout  - airway clearance facilitate secretion mobilization  - c/w empiric abx   - f/u BAL cultures, urine strep and legionella      ECMO  VV ECMO		  Cannulation sites/dates: 9/13/23 R fem 25F. RIJ 19F   Flow: 3.86		P1: 154		Delta P: 23  RPM: 2800		P2: 132		SVO2: 83  Sweep:   4     L/min:		FIO2:   100  #Acute on Chronic Hypoxemic Respiratory Failure  #ARDS  # VV ECMO:    ECMO Calculated CO:  DO2/VO2: .15  VO2/DO2: 6.4    - right groin cannula pulled-back ~3-4cm to position tip of cannula just inferior to hepatic vein.  -Clinically perfusing. Lactate: downtrending 7.4 > 6.1  - ECMO sweep sighing q1hr  - Adjust circuit flow as tolerated with DO2:VO2 goal >2  - Monitor for hemolysis, chatter, evidence of recirculation,       CARDIOVASCULAR  #Tachyarrhythmia  #AF RVR  #Distributive Shock  Hx of Afib w at OSH, started on Amiodarone gtt now in shock state requiring low dose pressors most likely vasoplegic in the setting sedation, now with bradycardia   Shock state requiring pressors   - likely septic with component of vaspoplegia i/s/o sedation, possible contribution of cardiogenic from RV dysfunction  - c/w norepinephrine, will wean off Roosevelt today maintaining map >65 and continue Vasopressin for now while on NO2  - discontinue amio gtt iso bradycardia  - TTE 9/12 with EF 65-70% with normal LV and RV  - TTE shows EF 18%. Severe global LV systolic dysfunction. RV enlargement with decreased RV systolic function  - POCUS:  Normal LVSF. RV systolic function improved from yesterday  - CT PE negative for PE    #Concern for Right Heart dysfunction  #Mild pHTN  - Likely i/s/o hypoxic vasoconstriction and high PEEP  - s/p milrinone gtt, d/c'd 9/13  - No PE seen on invasive pulmonary angiography at time of ECMO cannulation  - currently on NO2 40ppm, will wean while watching RV function      Lines  -Ecmo Cannulas  -[facility] TLC- 9/13 (placed at OSH)  -Left Axill Traci - 9/13 (placed at OSH)      RENAL  sCr baseline 0.78  - Creatinine wnl, 0.77 today   - indwelling catheter in place, making good UO 40-100ml/hr  - monitor i/o's, positive +2.5L LOS/ negative -670ml 24hrs  - monitor, Replete to K>4, Phos>3, Mg>2, iCa>1      GASTROINTESTINAL  # Nutrition  #Transaminase elevation  - LFT's elevated, now slightly downtrending, however T.bili is high and uptrending, likely 2/2 combination of shock liver and malposition of ECMO cannula, if not improving can consider pulling back ECMO cannula  - NGT in place, can start tube feeds and increase to goal rate as tolerated  - continue PPI iso steroids   - continue bowel regimen senna and miralax and naloxogel to avoid opioid induced constipation  - nutrition following    INFECTIOUS DISEASE  No leukocytosis, afebrile  - c/w empiric donald (9/13- ) vancomycin (9/14- ) pending mrsa pcr and azithro pending Ulegionella  - c/w Bactrim DS for PJP prophylaxis  - 9/13 UA negative  - f/u BAL cyto, cell count, cultures, PCP PCR, fungitell, aspergillus  - f/u blood and urine cultures, MRSA PCR, urine legionella. RVP negative      ENDOCRINE  # Hyperglycemia i/s/o steroids  Admission A1c 6.1  - blood glucose 200-300's  - hyperglycemia iso steroids   - can add NPH, increase to 5units q6 and sliding scale  - monitor closely and adjust as needed      HEME  # ECMO anticoagulation  - continue argatroban gtt goal aPTT 50-70  - INR elevated likely iso argatroban, however will give dose Vit. K  - Hgb stable 11.3  - Transfuse for Hgb <7, Plt<10    #R/O DVT  - LE duplex negative for DVT but will repeat  - f/u duplex    MSK  - c/o B/l hand and arm weakness and numbness ?2/2 to radiculopathy, unnclear etiology, likely myositis since pt has been having worsening weakness with movement of extremities vs Cellcept s/e. Symptoms improving subjectively.  - f/u with Dr. Nik Marie or Dante Urbano for EMG upon discharge (osh?)      RHEUM  - CT findings at OSH consistent with ILD   - CT findings, Improved/decreased extent of bilateral groundglass opacities and reticular markings compared to the previous study. Findings are nonspecific but differential etiologies include interstitial pneumonia, drug toxicity, nonspecific connective tissue disorders.  - OSH labs show strongly positive ARIANA, RNP, and anti smith antibodies with low C4 and normal C3. Patient with negative SSa and SSb, negative DsDNa,  - Rheum consult  - s/p 1g solumedrol - first dose on 9/13, plan for 1gm dose of solumedrol again today 9/14, and 1gm dose tomorrow 9/15  Ferritin lupus anticoagulant, DRRVT, Anti B2 glycoprotein and anticardiolipin IgG, IgM, C3, C4.       PROPHYLAXIS  # DVT: argatroban  # GI: TF      GOC  -Palliative Following    DISPO: ICU  CODE STATUS: Full       61 yo F w/ Afib initially admitted to [facility] on 8/26 after p/w SOB and found to be hypoxemic, reported having  SOB as well as debilitating b/l hand cramps and some muscles weakness several months.  Underwent bronchoscopy with TBBX on 8/30 which showed Non-Specific Intersitial Disease (NSIP)/OP. Labs notable for positive ARIANA 1:1280, RNP > 8, Alejandro > 8. Patient was started on steroids and recieved cellcept, which was discontinued 2/2 tachycardia. Interval CTA chest on 9/11 also showed improved in reticular findings and diffuse GGO,  course c/b episode of SVT to 170s w/ rapidly progressive hypoxemic respiratory failure leading to intubation  9/13. Patient was restarted on empiric abx with Vanc and Zosyn on 9/12. Despite best practices, patient remamined hypoxemic and patient was cannulated for VV-ECMO on 9/13 and transferred to [facility].      NEUROLOGY  Home meds: gabapentin 100g TID  AA&Ox3 at baseline   - continue sedation with propfol and dilaudid i/s/o severe hypoxemic respiratory failure   - low threshold to paralyze if dysynchronus,   - received cisatracurium IVP given for procedures yesterday  - [facility] 9/14 no acute findings\  - Palliative following  - PT consult     PULMONARY  Acute hypoxemic respiratory failure 2/2 bacterial PNA vs atypical PNA vs aspiration vs AEILD. Intubated on 9/13  requiring VV ECMO.  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    -Bronch performed..... follow up BAL cultures  - Current settings: PC  RR 12, PS 12, PEEP 12 Fi02 80% Ppeak: 24 pulling TV ~40  - Emergency settings:   - Bronchoscopy showed mild thick yellow secretions in trachea, diffuse moderate thin green/brown secretions throughout  - airway clearance facilitate secretion mobilization  - c/w empiric abx   - f/u BAL cultures, urine strep and legionella      ECMO  VV ECMO		  Cannulation sites/dates: 9/13/23 R fem 25F. RIJ 19F   Flow: 3.86		P1: 154		Delta P: 23  RPM: 2800		P2: 132		SVO2: 83  Sweep:   4     L/min:		FIO2:   100  #Acute on Chronic Hypoxemic Respiratory Failure  #ARDS  # VV ECMO:    ECMO Calculated CO:  DO2/VO2: .15  VO2/DO2: 6.4    - right groin cannula pulled-back ~3-4cm to position tip of cannula just inferior to hepatic vein.  -Clinically perfusing. Lactate: downtrending 7.4 > 6.1  - ECMO sweep sighing q1hr  - Adjust circuit flow as tolerated with DO2:VO2 goal >2  - Monitor for hemolysis, chatter, evidence of recirculation,       CARDIOVASCULAR  #Tachyarrhythmia  #AF RVR  #Distributive Shock  Hx of Afib w at OSH, started on Amiodarone gtt now in shock state requiring low dose pressors most likely vasoplegic in the setting sedation, now with bradycardia   Shock state requiring pressors   - likely septic with component of vaspoplegia i/s/o sedation, possible contribution of cardiogenic from RV dysfunction  - c/w norepinephrine, will wean off Roosevelt today maintaining map >65 and continue Vasopressin for now while on NO2  - discontinue amio gtt iso bradycardia  - TTE 9/12 with EF 65-70% with normal LV and RV  - TTE shows EF 18%. Severe global LV systolic dysfunction. RV enlargement with decreased RV systolic function  - POCUS:  Normal LVSF. RV systolic function improved from yesterday  - CT PE negative for PE    #Concern for Right Heart dysfunction  #Mild pHTN  - Likely i/s/o hypoxic vasoconstriction and high PEEP  - s/p milrinone gtt, d/c'd 9/13  - No PE seen on invasive pulmonary angiography at time of ECMO cannulation  - currently on NO2 40ppm, will wean while watching RV function       RENAL  sCr baseline 0.78  - Creatinine wnl, 0.77 today   - indwelling catheter in place, making good UO 40-100ml/hr  - monitor i/o's, positive +2.5L LOS/ negative -670ml 24hrs  - monitor, Replete to K>4, Phos>3, Mg>2, iCa>1      GASTROINTESTINAL  # Nutrition  #Transaminase elevation  - LFT's elevated, now slightly downtrending, however T.bili is high and uptrending, likely 2/2 combination of shock liver and malposition of ECMO cannula, if not improving can consider pulling back ECMO cannula  - NGT in place, can start tube feeds and increase to goal rate as tolerated  - continue PPI iso steroids   - continue bowel regimen senna and miralax and naloxogel to avoid opioid induced constipation  - nutrition following    INFECTIOUS DISEASE  No leukocytosis, afebrile  - c/w empiric donald (9/13- ) vancomycin (9/14- ) pending mrsa pcr and azithro pending Ulegionella  - c/w Bactrim DS for PJP prophylaxis  - 9/13 UA negative  - f/u BAL cyto, cell count, cultures, PCP PCR, fungitell, aspergillus  - f/u blood and urine cultures, MRSA PCR, urine legionella. RVP negative      ENDOCRINE  # Hyperglycemia i/s/o steroids  Admission A1c 6.1  - blood glucose 200-300's  - hyperglycemia iso steroids   - can add NPH, increase to 5units q6 and sliding scale  - monitor closely and adjust as needed      HEME  # ECMO anticoagulation  - continue argatroban gtt goal aPTT 50-70  - INR elevated likely iso argatroban, however will give dose Vit. K  - Hgb stable 11.3  - Transfuse for Hgb <7, Plt<10    #R/O DVT  - LE duplex negative for DVT but will repeat  - f/u duplex    MSK  #Hx Sciatica  - c/o B/l hand and arm weakness and numbness ?2/2 to radiculopathy, unnclear etiology, likely myositis since pt has been having worsening weakness with movement of extremities vs Cellcept s/e. Symptoms improving subjectively.  - f/u with Dr. Nik Marie or Dante Urbano for EMG upon discharge (osh?)      RHEUM  - CT findings at OSH consistent with ILD   - CT findings, Improved/decreased extent of bilateral groundglass opacities and reticular markings compared to the previous study. Findings are nonspecific but differential etiologies include interstitial pneumonia, drug toxicity, nonspecific connective tissue disorders.  - OSH labs show strongly positive ARIANA, RNP, and anti smith antibodies with low C4 and normal C3. Patient with negative SSa and SSb, negative DsDNa,  - Rheum consult  - s/p 1g solumedrol - first dose on 9/13, plan for 1gm dose of solumedrol again today 9/14, and 1gm dose tomorrow 9/15  Ferritin lupus anticoagulant, DRRVT, Anti B2 glycoprotein and anticardiolipin IgG, IgM, C3, C4.   - Plan for [facility] plasmapheresis      PROPHYLAXIS  # DVT: argatroban  # GI: TF      LINES  -Ecmo Cannulas  -[facility] TLC- 9/13 (placed at OSH)  -Left Axill Kimberly - 9/13 (placed at OSH)      GOC  -Palliative Following    DISPO: ICU  CODE STATUS: Full       61 yo F w/ Afib initially admitted to [facility] on 8/26 after p/w SOB and found to be hypoxemic, reported having  SOB as well as debilitating b/l hand cramps and some muscles weakness several months. Found to be hypoxic and have interstitial lung disease appearance on CT, admitted 8/26 for AHRF, further ILD workup and management,  started on prednisone on admission with gradually improving O2 requirement and has been stable on 2-3LNC since 9/3, underwent bronchoscopy with TBBX on 8/30 which showed Non-Specific Intersitial Disease (NSIP)/OP. Labs notable for positive ARIANA 1:1280, RNP > 8, Alejandro > 8. Patient was started on steroids and recieved cellcept, which was discontinued 2/2 tachycardia. Interval CTA chest on 9/11 also showed improved in reticular findings and diffuse GGO,  course was then c/b episode of SVT to 170s w/ rapidly progressive hypoxemic respiratory failure leading to intubation  9/13. Patient was restarted on empiric abx with Vanc and Zosyn on 9/12. Despite best practices, patient remained hypoxemic and patient was cannulated for VV-ECMO on 9/13 and transferred to [facility].      NEUROLOGY  Home meds: gabapentin 100g TID  AA&Ox3 at baseline   - continue sedation with propfol and dilaudid i/s/o severe hypoxemic respiratory failure   - low threshold to paralyze if dysynchronus,   - received cisatracurium IVP given for procedures yesterday  - [facility] 9/14 no acute findings\  - Palliative following  - PT consult     PULMONARY  Acute hypoxemic respiratory failure 2/2 bacterial PNA vs atypical PNA vs aspiration vs AEILD. Intubated on 9/13  requiring VV ECMO.  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    -Bronch performed..... follow up BAL cultures  - Current settings: PC  RR 12, PS 12, PEEP 12 Fi02 80% Ppeak: 24 pulling TV ~40  - Emergency settings:   - Bronchoscopy showed mild thick yellow secretions in trachea, diffuse moderate thin green/brown secretions throughout  - airway clearance facilitate secretion mobilization  - c/w empiric abx   - f/u BAL cultures, urine strep and legionella      ECMO  VV ECMO		  Cannulation sites/dates: 9/13/23 R fem 25F. RIJ 19F   Flow: 3.86		P1: 154		Delta P: 23  RPM: 2800		P2: 132		SVO2: 83  Sweep:   4     L/min:		FIO2:   100  #Acute on Chronic Hypoxemic Respiratory Failure  #ARDS  # VV ECMO:    ECMO Calculated CO:  DO2/VO2: .15  VO2/DO2: 6.4    - right groin cannula pulled-back ~3-4cm to position tip of cannula just inferior to hepatic vein.  -Clinically perfusing. Lactate: downtrending 7.4 > 6.1  - ECMO sweep sighing q1hr  - Adjust circuit flow as tolerated with DO2:VO2 goal >2  - Monitor for hemolysis, chatter, evidence of recirculation,       CARDIOVASCULAR  #Tachyarrhythmia  #AF RVR  #Distributive Shock  Hx of Afib w at OSH, started on Amiodarone gtt now in shock state requiring low dose pressors most likely vasoplegic in the setting sedation, now with bradycardia   Shock state requiring pressors   - likely septic with component of vaspoplegia i/s/o sedation, possible contribution of cardiogenic from RV dysfunction  - c/w norepinephrine, will wean off Roosevelt today maintaining map >65 and continue Vasopressin for now while on NO2  - discontinue amio gtt iso bradycardia  - TTE 9/12 with EF 65-70% with normal LV and RV  - TTE shows EF 18%. Severe global LV systolic dysfunction. RV enlargement with decreased RV systolic function  - POCUS:  Normal LVSF. RV systolic function improved from yesterday  - CT PE negative for PE    #Concern for Right Heart dysfunction  #Mild pHTN  - Likely i/s/o hypoxic vasoconstriction and high PEEP  - s/p milrinone gtt, d/c'd 9/13  - No PE seen on invasive pulmonary angiography at time of ECMO cannulation  - currently on NO2 40ppm, will wean while watching RV function       RENAL  sCr baseline 0.78  - Creatinine wnl, 0.77 today   - indwelling catheter in place, making good UO 40-100ml/hr  - monitor i/o's, positive +2.5L LOS/ negative -670ml 24hrs  - monitor, Replete to K>4, Phos>3, Mg>2, iCa>1      GASTROINTESTINAL  # Nutrition  #Transaminase elevation  - LFT's elevated, now slightly downtrending, however T.bili is high and uptrending, likely 2/2 combination of shock liver and malposition of ECMO cannula, if not improving can consider pulling back ECMO cannula  - NGT in place, can start tube feeds and increase to goal rate as tolerated  - continue PPI iso steroids   - continue bowel regimen senna and miralax and naloxogel to avoid opioid induced constipation  - nutrition following    INFECTIOUS DISEASE  No leukocytosis, afebrile  - c/w empiric donald (9/13- ) vancomycin (9/14- ) pending mrsa pcr and azithro pending Ulegionella  - c/w Bactrim DS for PJP prophylaxis  - 9/13 UA negative  - f/u BAL cyto, cell count, cultures, PCP PCR, fungitell, aspergillus  - f/u blood and urine cultures, MRSA PCR, urine legionella. RVP negative      ENDOCRINE  # Hyperglycemia i/s/o steroids  Admission A1c 6.1  - blood glucose 200-300's  - hyperglycemia iso steroids   - can add NPH, increase to 5units q6 and sliding scale  - monitor closely and adjust as needed      HEME  # ECMO anticoagulation  - continue argatroban gtt goal aPTT 50-70  - INR elevated likely iso argatroban, however will give dose Vit. K  - Hgb stable 11.3  - Transfuse for Hgb <7, Plt<10    #R/O DVT  - LE duplex negative for DVT but will repeat  - f/u duplex    MSK  #Hx Sciatica  - c/o B/l hand and arm weakness and numbness ?2/2 to radiculopathy, unnclear etiology, likely myositis since pt has been having worsening weakness with movement of extremities vs Cellcept s/e. Symptoms improving subjectively.  - f/u with Dr. Nik Marie or Dante Urbano for EMG upon discharge (osh?)      RHEUM  - CT findings at OSH consistent with ILD   - CT findings, Improved/decreased extent of bilateral groundglass opacities and reticular markings compared to the previous study. Findings are nonspecific but differential etiologies include interstitial pneumonia, drug toxicity, nonspecific connective tissue disorders.  - OSH labs show strongly positive ARIANA, RNP, and anti smith antibodies with low C4 and normal C3. Patient with negative SSa and SSb, negative DsDNa,  - Rheum consult  - s/p 1g solumedrol - first dose on 9/13, plan for 1gm dose of solumedrol again today 9/14, and 1gm dose tomorrow 9/15  Ferritin lupus anticoagulant, DRRVT, Anti B2 glycoprotein and anticardiolipin IgG, IgM, C3, C4.   - Plan for [facility] plasmapheresis      PROPHYLAXIS  # DVT: argatroban  # GI: TF      LINES  -Ecmo Cannulas  -[facility] TLC- 9/13 (placed at OSH)  -Left Axill Traci - 9/13 (placed at OSH)      GOC  -Palliative Following    DISPO: ICU  CODE STATUS: Full       63 yo F w/ Afib initially admitted to [facility] on 8/26 after p/w SOB and found to be hypoxemic, reported having  SOB as well as debilitating b/l hand cramps and some muscles weakness several months. Found to be hypoxic and have interstitial lung disease appearance on CT, admitted 8/26 for AHRF, further ILD workup and management,  started on prednisone on admission with gradually improving O2 requirement and has been stable on 2-3LNC since 9/3, underwent bronchoscopy with TBBX on 8/30 which showed Non-Specific Intersitial Disease (NSIP)/OP. Labs notable for positive ARIANA 1:1280, RNP > 8, Alejandro > 8. Patient was started on steroids and recieved cellcept, which was discontinued 2/2 tachycardia. Interval CTA chest on 9/11 also negative PE  but showed possible lingula pneumonia.  Started on empiric vancomycin and zosyn 9/12, course was then c/b episode of SVT to 170s w/ rapidly progressive hypoxemic respiratory failure, placed NRB offered HFNC/BiPAP but refused, leading to worsening hypoxemic respiratory failure requiring intubation  9/13. Despite best practices, patient remained hypoxemic and patient was cannulated for VV-ECMO on 9/13 and transferred to [facility].      NEUROLOGY  Home meds: gabapentin 100g TID  AA&Ox3 at baseline   - continue sedation with propfol and dilaudid i/s/o severe hypoxemic respiratory failure   - low threshold to paralyze if dysynchronus,   - received cisatracurium IVP given for procedures yesterday  - [facility] 9/14 no acute findings  - Palliative following  - PT consult     PULMONARY  Acute hypoxemic respiratory failure 2/2 bacterial PNA vs atypical PNA vs aspiration vs AEILD. Intubated on 9/13  requiring VV ECMO.  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   #Acute on Chronic Hypoxemic Respiratory Failure  # VV ECMO  - Current settings: PC  RR 12, PS 12, PEEP 12 Fi02 80% Ppeak: 24 pulling TV ~40  - Emergency settings:   - Bronchoscopy showed mild thick yellow secretions in trachea, diffuse moderate thin green/brown secretions throughout, follow up BAL cultures  - airway clearance to assist facilitate secretion mobilization with IPV and duonebs  - c/w empiric abx   - f/u BAL cultures, urine strep and legionella      ECMO  VV ECMO		  Cannulation sites/dates: 9/13/23 R fem 25F. RIJ 19F   Flow: 3.86		P1: 154		Delta P: 23  RPM: 2800		P2: 132		SVO2: 83  Sweep:   4     L/min:		FIO2:   100      ECMO Calculated CO:  5.5  DO2/VO2: .15  VO2/DO2: 6.4    - right groin cannula pulled-back ~3-4cm to position tip of cannula just inferior to hepatic vein.  - clinically perfusing. Lactate: downtrending 7.4 > 6.1  - ECMO sweep sighing q1hr  - Adjust circuit flow as tolerated with DO2:VO2 goal >2  - Monitor for hemolysis, chatter, evidence of recirculation,       CARDIOVASCULAR  #Tachyarrhythmia  #AF RVR  #Distributive Shock  Hx of Afib w at OSH, started on Amiodarone gtt now in shock state requiring low dose pressors most likely vasoplegic in the setting sedation, now with bradycardia   Shock state requiring pressors   - likely septic with component of vaspoplegia i/s/o sedation, possible contribution of cardiogenic from RV dysfunction  - c/w norepinephrine, will wean off Roosevelt today maintaining map >65 and continue Vasopressin for now while on NO2  - discontinue amio gtt iso bradycardia  - TTE 9/12 with EF 65-70% with normal LV and RV  - TTE shows EF 18%. Severe global LV systolic dysfunction. RV enlargement with decreased RV systolic function  - POCUS:  Normal LVSF. RV systolic function improved from yesterday  - CT PE negative for PE    #Concern for Right Heart dysfunction  #Mild pHTN  - Likely i/s/o hypoxic vasoconstriction and high PEEP  - s/p milrinone gtt, d/c'd 9/13  - No PE seen on invasive pulmonary angiography at time of ECMO cannulation  - currently on NO2 40ppm, will wean while watching RV function       RENAL  sCr baseline 0.78  - Creatinine wnl, 0.77 today   - indwelling catheter in place, making good UO 40-100ml/hr  - monitor i/o's, positive +2.5L LOS/ negative -670ml 24hrs  - monitor, Replete to K>4, Phos>3, Mg>2, iCa>1      GASTROINTESTINAL  # Nutrition  #Transaminase elevation  - LFT's elevated, now slightly downtrending, however T.bili is high and uptrending, likely 2/2 combination of shock liver and malposition of ECMO cannula, if not improving can consider pulling back ECMO cannula  - NGT in place, can start tube feeds and increase to goal rate as tolerated  - continue PPI iso steroids   - continue bowel regimen senna and miralax and naloxogel to avoid opioid induced constipation  - nutrition following    INFECTIOUS DISEASE  No leukocytosis, afebrile  - c/w empiric donald (9/13- ) vancomycin (9/14- ) pending mrsa pcr and azithro pending Ulegionella  - c/w Bactrim DS for PJP prophylaxis  - 9/13 UA negative  - f/u BAL cyto, cell count, cultures, PCP PCR, fungitell, aspergillus  - f/u blood and urine cultures, MRSA PCR, urine legionella. RVP negative      ENDOCRINE  # Hyperglycemia i/s/o steroids  Admission A1c 6.1  - blood glucose 200-300's  - hyperglycemia iso steroids   - can add NPH, increase to 5units q6 and mod ISS  - monitor closely and adjust as needed      HEME  # ECMO anticoagulation  - continue argatroban gtt goal aPTT 50-70  - INR elevated likely iso argatroban, however will give dose Vit. K  - Hgb stable 11.3  - Transfuse for Hgb <7, Plt<10    #R/O DVT  - LE duplex negative for DVT but will repeat  - f/u duplex    MSK  #Hx Sciatica  - c/o B/l hand and arm weakness and numbness ?2/2 to radiculopathy, unnclear etiology, likely myositis since pt has been having worsening weakness with movement of extremities vs Cellcept s/e. Symptoms improving subjectively.  - f/u with Dr. Nik Marie or Dante Urbano for EMG upon discharge (osh?)      RHEUM  - CT findings at OSH consistent with ILD   - CT findings, Improved/decreased extent of bilateral groundglass opacities and reticular markings compared to the previous study. Findings are nonspecific but differential etiologies include interstitial pneumonia, drug toxicity, nonspecific connective tissue disorders.  - OSH labs show strongly positive ARIANA, RNP, and anti smith antibodies with low C4 and normal C3. Patient with negative SSa and SSb, negative DsDNa,  - Rheum consult  - s/p 1g solumedrol - first dose on 9/13, plan for 1gm dose of solumedrol again today 9/14, and 1gm dose tomorrow 9/15  Ferritin lupus anticoagulant, DRRVT, Anti B2 glycoprotein and anticardiolipin IgG, IgM, C3, C4.   - Plan for [facility] plasmapheresis      PROPHYLAXIS  # DVT: argatroban  # GI: TF      LINES  -Ecmo Cannulas  -[facility] TLC- 9/13 (placed at OSH)  -Left Axill Westbrook - 9/13 (placed at OSH)      GOC  -Palliative Following    DISPO: ICU  CODE STATUS: Full

## 2023-09-14 NOTE — CONSULT NOTE ADULT - SUBJECTIVE AND OBJECTIVE BOX
***consult has been received. note is in progress and incomplete without attending attestation***    Candice Hollingsworth MD  PGY-4  Rheumatology Fellow  [employer]  [phone]    ALIZA FOLEY  [id number]    HISTORY OF PRESENT ILLNESS:    62 year old female with a past medical history of afib, and sciatica, who was transferred from [facility] for ECMO. Patient was inittially presented [facility] for worsening shortness of breath, found to be hypoxic and CT chest revealed have interstitial lung disease appearance. She was admitted 8/26 for AHRF, further ILD workup and management. TTE 8/26 with normal LVEF. Pt was started on prednisone 40 mg IV daily  with gradually improving O2 requirement and has been stable on 2-3 LNC until 9/3. Rheuamtology was consulted, she was started on Mycophenolate mofetil 500 mg BID on 9/8 but she developed tachycardia and MMF was discontinueds.  Repat CTA chest (8/23) showed interval improvements in GGO but possible lingular bleb and RML bronchiectasis.  Started on empiric vancomycin and zosyn given concern for possible pneumonia on repeat CT scan.  Patient acceded to HFNC in which she desatted and presented with increased wob for which she was transitioned to BiPAP.  Patient saturation was dropped to 70s. STAT CXR showed increased pulmonary congestion for which patient was administered lasix without improvement. Intubated and started on mechanical ventilation but required very high PEEP (18) and 100% FiO2 and still had low saturations.  ECMO team was consulted and accepted to [facility] yesterday.    As per the EMR, she has been having chronic SOB for several months and got worked up in [place] where she had a CT scan in June, 23 that was negative for PE. Had "bronchial infection" in [place] 4 months ago and her symptoms have persisted since. She also states that she was seen by a pulmonologist and rheumatology, where they ruled out lupus and other immunological disorders. 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  Prior to hospitalization, She was having bilateral hand pain with paresthesias,  rash on chest wall, heaviness of legs without muscle weakness, oral ulcers, she was started on prednisone 20 mg bid prior to admission. The patient was Pt initialy went to the urgent care where she had a CXR performed concerning for b/l lower infiltrates and saturation was 88%  and was brought to the [facility] ED by car from the ambulance.       Denies any family history of lung disease, lung cancer, or autoimmune/rheumatological diseases..  Never smoker.      MEDICATIONS  (STANDING):  aMIOdarone Infusion 0.5 mG/Min (16.7 mL/Hr) IV Continuous <Continuous>  argatroban Infusion 0.1 MICROgram(s)/kG/Min (0.77 mL/Hr) IV Continuous <Continuous>  azithromycin  IVPB 500 milliGRAM(s) IV Intermittent every 24 hours  azithromycin  IVPB      chlorhexidine 0.12% Liquid 15 milliLiter(s) Oral Mucosa every 12 hours  chlorhexidine 2% Cloths 1 Application(s) Topical <User Schedule>  cisatracurium Injectable 20 milliGRAM(s) IV Push once  dextrose 5%. 1000 milliLiter(s) (50 mL/Hr) IV Continuous <Continuous>  dextrose 5%. 1000 milliLiter(s) (100 mL/Hr) IV Continuous <Continuous>  dextrose 50% Injectable 25 Gram(s) IV Push once  dextrose 50% Injectable 12.5 Gram(s) IV Push once  dextrose 50% Injectable 25 Gram(s) IV Push once  fentaNYL    Injectable 100 MICROGram(s) IV Push once  glucagon  Injectable 1 milliGRAM(s) IntraMuscular once  HYDROmorphone Infusion. 1 mG/Hr (1 mL/Hr) IV Continuous <Continuous>  insulin lispro (ADMELOG) corrective regimen sliding scale   SubCutaneous every 6 hours  insulin NPH human recombinant 3 Unit(s) SubCutaneous every 6 hours  meropenem  IVPB      meropenem  IVPB 1000 milliGRAM(s) IV Intermittent every 8 hours  methylPREDNISolone sodium succinate IVPB 1000 milliGRAM(s) IV Intermittent once  milrinone Infusion 0.375 MICROgram(s)/kG/Min (14.8 mL/Hr) IV Continuous <Continuous>  multivitamin  Chewable 1 Tablet(s) Chew daily  naloxegol 25 milliGRAM(s) Oral daily  norepinephrine Infusion 0.3 MICROgram(s)/kG/Min (36.9 mL/Hr) IV Continuous <Continuous>  pantoprazole  Injectable 40 milliGRAM(s) IV Push daily  phenylephrine    Infusion 1.5 MICROgram(s)/kG/Min (36.9 mL/Hr) IV Continuous <Continuous>  polyethylene glycol 3350 17 Gram(s) Oral two times a day  propofol Infusion 15.001 MICROgram(s)/kG/Min (11.8 mL/Hr) IV Continuous <Continuous>  senna Syrup 10 milliLiter(s) Oral two times a day  trimethoprim  40 mG/sulfamethoxazole 200 mG Suspension 160 milliGRAM(s) Oral daily  vancomycin  IVPB 1750 milliGRAM(s) IV Intermittent every 12 hours  vasopressin Infusion 0.06 Unit(s)/Min (9 mL/Hr) IV Continuous <Continuous>    MEDICATIONS  (PRN):  dextrose Oral Gel 15 Gram(s) Oral once PRN Blood Glucose LESS THAN 70 milliGRAM(s)/deciliter      Allergies: No Known Allergies        PAST MEDICAL & SURGICAL HISTORY:        Vital Signs Last 24 Hrs  T(C): 35.4 (14 Sep 2023 08:00), Max: 36.5 (13 Sep 2023 15:30)  T(F): 95.7 (14 Sep 2023 08:00), Max: 97.7 (13 Sep 2023 15:30)  HR: 51 (14 Sep 2023 09:00) (51 - 156)  BP: --  BP(mean): --  RR: 12 (14 Sep 2023 09:00) (0 - 25)  SpO2: 100% (14 Sep 2023 09:00) (93% - 100%)    Parameters below as of 14 Sep 2023 09:00  Patient On (Oxygen Delivery Method): ventilator    O2 Concentration (%): 80    Physical Exam:  General: No apparent distress  HEENT: EOMI, MMM  CVS: +S1/S2, RRR, no murmurs/rubs/gallops  Resp: CTA b/l. No crackles/wheezing  GI: Soft, NT/ND +BS  MSK: no swelling/warmth/erythema of the joints of the UE/LE  Neuro: AAOx3  Skin: no visible rashes    LABS:                        12.1   6.08  )-----------( 83       ( 14 Sep 2023 04:00 )             35.3     09-14    135  |  94<L>  |  32<H>  ----------------------------<  309<H>  4.4   |  20<L>  |  0.87    Ca    8.4      14 Sep 2023 04:00  Phos  3.3     09-14  Mg     2.20     09-14    TPro  4.8<L>  /  Alb  3.4  /  TBili  4.2<H>  /  DBili  2.8<H>  /  AST  663<H>  /  ALT  609<H>  /  AlkPhos  71  09-14    PT/INR - ( 14 Sep 2023 04:00 )   PT: 42.2 sec;   INR: 3.92 ratio         PTT - ( 14 Sep 2023 07:04 )  PTT:93.0 sec  Urinalysis Basic - ( 14 Sep 2023 04:00 )    Color: x / Appearance: x / SG: x / pH: x  Gluc: 309 mg/dL / Ketone: x  / Bili: x / Urobili: x   Blood: x / Protein: x / Nitrite: x   Leuk Esterase: x / RBC: x / WBC x   Sq Epi: x / Non Sq Epi: x / Bacteria: x      Culture - Bronchial (collected 09-13-23 @ 15:45)  Source: .Bronchial Bronchial Lavage  Gram Stain (09-14-23 @ 07:27):    Rare polymorphonuclear leukocytes seen per low power field    No squamous epithelial cells seen per low power field    No organisms seen per oil power field        Rheumatology Work Up    Creatine Kinase, Serum: 43 U/L [25 - 170] (09-11-23 @ 05:30)  Protein/Creatinine Ratio Calculation: 0.2 Ratio [0.0 - 0.2] (08-31-23 @ 07:41)  Sedimentation Rate, Erythrocyte: 26 mm/Hr *H* [[facility] performs Erythrocyte Sedimentation Rate assay  utilizing the Westergren method] (08-30-23 @ 06:08)  C-Reactive Protein, Serum: 18.1 mg/L *H* [0.0 - 4.0] (08-30-23 @ 06:08)    ARIANA 1:1280 Speckled  DsDNA <12  SM (Alejandro) Ab FBIA >8  Anti-RNP >8  c-ANCA Negative  p-ANCA Negative  Atypical ANCA Indeterminate  Anti-SS-A Ab 0.4  Anti-SS-B Ab <0.2  RF Quant <10  CCP Ab IgG Negative  Centromere Ab <0.2  RNA Polymerase III IgG 16    Systemic Sclerosis 12 Ab Pnl 2 (08- 05:30)  CCP <8  Scl 70 <11  CENP-A <11  RP11 <11   <11  U1-snRNP RNP A 162  U1-snRNP RNP C 71  U1-snRNP RNP-70kd 111  Fibrillarin <11  Th/To <11  PM/Scl-100 <11  PM/Scl-75 <11    Hypersensitivity Pneumonitis Panel – Negative (08- 6:06)    Bronchoscopy results (08- 12:54)  -	Fungitell B-D Galactomanan 93   -	Aspergillus Galactomannan Antigen 1.75  -	Appearance – Hazy  -	Color – No Color  -	Total Nucleated Cell Count – 19   -	Total RBC Count – 1123   -	Neutrophils 13  -	BF Lymphocytes 2  -	Monocytes/Macrophages 4   -	Cytopathology  o	Final Diagnosis  o	BRONCHOALVEOLAR LAVAGE, RIGHT LOWER LOBE:  o	NEGATIVE FOR MALIGNANT CELLS.  o	Pulmonary macrophages, bronchial cells and squamous cells.  -	Surgical Pathology  o	Final Diagnosis  o	Lung, right lower lobe; biopsy:  o	Bronchial tissue and interstitium showing organizing pneumonia and focal fibrin - See Note.  o	Note: Multiple deeper levels were performed on block 1A. The histologic findings are that of organizing pneumonia and focal fibrin. No granuloma, neoplasm or interstitial fibrosis is identified. Clinical and radiographic correlation is suggested. This case was also reviewed virtually at the [facility]Wide [facility]acic Pathology Consensus Conference on 8/31/2023, and again virtually to Dr. Andino on 9/1/2023.  -	TM Interpretation  o	Diagnosis:  o	Bronchioalveolar Lavage  o	Flow cytometric analysis attempted, however insufficient cells to report.      BRONCHOSCOPY (08- 11:30)  Procedure Performed: Bronchoscopy with BAL and TBBx    The bronchoscope was inserted with ease and the airway examined to subsegments bilaterally. BAL performed in RB8 followed by TBBX performed in RB8 3 subsegments. Patient tolerated procedure well.    Limitations/Complications:  There were no apparent limitations or complications    Findings:  Normal distal trachea and main orlando. Scant mucous bilaterally. No endobronchial lesions. Mildly ectatic airways.    Impressions:  Normal anatomy, mildly ectatic airways and scant secretions. .    Plan:  CXR in endo may need higher supplemental oxygen few hours after procedure, wean off, incentive spirometry    Specimens:  BAL for cell count, diff, CD4/8, micro, GM, fungitell and cyto. TBBX for path      RADIOLOGY & ADDITIONAL STUDIES:      CT Angio Chest PE Protocol w/ IV Cont (08.26.23 @ 15:10)   FINDINGS: No visualized PE.The pulmonary trunk measures 3.3 cm diameter.     The heart is enlarged.     Trace left lateral asymmetric pericardial effusion is seen.     The great vessels are unremarkable. Left vertebral artery arises   directly from the aortic arch.     No mediastinal or hilar lymphadenopathy is seen.    Mediastinal lipomatosis.    Evaluation of the pulmonary parenchyma demonstrates bilateral   interstitial lung disease in the upper, mid and lower zones with   peripheral groundglass opacities and reticulation. Less involvement of   the bilateral upper zones. No traction bronchiectasis or honeycombing.    Bilateral air trapping.     No pleural effusions are seen.    Limited evaluation of the upper abdomen demonstrates 1.6 cm hypodensity   in segment one likely cyst. Small to moderate hiatal hernia.    Evaluation of the osseous structures demonstrates no significant   abnormality.      IMPRESSION:  No visualized PE.    Bilateral interstitial lung disease. Differential diagnosis includes  HP,NSIP, atypical pneumonia.      CT Chest No Cont (08.28.23 @ 16:47) >  IMPRESSION:  1. Since August 26, 2023, again interstitial lung disease non-UIP   pattern. Although no subpleural sparing, possibly interstitialpneumonia,   differential includes NSIP associated with connective tissue disorders,   chronic HP or drug toxicity.      CT Angio Chest PE Protocol w/ IV Cont (09.11.23 @ 13:45) >  IMPRESSION:  1.  Improved/decreased extent of bilateral groundglass opacities and   reticular markings compared to the previous study. Findings are   nonspecific but differential etiologies include interstitial pneumonia,   drug toxicity, nonspecific connective tissue disorders.  2.  New small focus of parenchymal consolidation seen in the lingula;   possible small focal pneumonia.  3.  No pulmonary embolism      < end of copied text >    BRONCHOSCOPY (2nd)   · Procedure Date/Time	13-Sep-2023 17:18  · Pre-Bronchoscopy Tuberculosis Risk Assessment Screening(check 'Preview' tab for full text on these list values when selected)	I evaluated the patient prior to bronchoscopy procedure for active pulmonary/laryngeal M. tuberculosis disease and the risk and actions taken:    Low risk with routine standard of care measures followed.  · Informed Consent	Benefits, risks, and possible complications of procedure explained to patient/caregiver who verbalized understanding and gave written consent.  · Procedure Performed By	Miky Borden  · Procedure Details	  Bronchoscope inserted through ETT. ETT noted to be in good position. Airway evaluation revealed mild thick yellow secretions in trachea. Diffuse moderate thin green/brown secretions throughout. Secretions therapeutically suctioned. BAL performed of lingula. Serial BAL x3 performed of RML with progressively bloody return.  Bronchoscope then withdrawn from ETT.    · Specimens	Cell Count, Gram Stain and Culture, Fungal Culture, Acid Fast Culture, Cytopathology             HISTORY OF PRESENT ILLNESS:    62 year old female with a past medical history of afib, and sciatica, who was transferred from [facility] for ECMO. Patient was inittially presented [facility] for worsening shortness of breath, found to be hypoxic and CT chest revealed have interstitial lung disease appearance. She was admitted 8/26 for AHRF, further ILD workup and management. TTE 8/26 with normal LVEF. Pt was started on prednisone 40 mg IV daily  with gradually improving O2 requirement and has been stable on 2-3 LNC until 9/3. Rheuamtology was consulted, she was started on Mycophenolate mofetil 500 mg BID on 9/8 but she developed tachycardia and MMF was discontinueds.  Repat CTA chest (8/23) showed interval improvements in GGO but possible lingular bleb and RML bronchiectasis.  Started on empiric vancomycin and zosyn given concern for possible pneumonia on repeat CT scan.  Patient acceded to HFNC in which she desatted and presented with increased wob for which she was transitioned to BiPAP.  Patient saturation was dropped to 70s. STAT CXR showed increased pulmonary congestion for which patient was administered lasix without improvement. Intubated and started on mechanical ventilation but required very high PEEP (18) and 100% FiO2 and still had low saturations.  ECMO team was consulted and accepted to [facility] center yesterday.  As per the EMR, she has been having chronic SOB for several months and got worked up in [place] where she had a CT scan in June, 23 that was negative for PE. Had "bronchial infection" in [place] 4 months ago and her symptoms have persisted since. She also states that she was seen by a pulmonologist and rheumatology, where they ruled out lupus and other immunological disorders. 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  Prior to hospitalization, She was having bilateral hand pain with paresthesias,  rash on chest wall, heaviness of legs without muscle weakness, oral ulcers, she was started on prednisone 20 mg bid prior to admission. The patient was Pt initialy went to the urgent care where she had a CXR performed concerning for b/l lower infiltrates and saturation was 88%  and was brought to the [facility] ED by car from the ambulance.     Denies any family history of lung disease, lung cancer, or autoimmune/rheumatological diseases..  Never smoker.      MEDICATIONS  (STANDING):  aMIOdarone Infusion 0.5 mG/Min (16.7 mL/Hr) IV Continuous <Continuous>  argatroban Infusion 0.1 MICROgram(s)/kG/Min (0.77 mL/Hr) IV Continuous <Continuous>  azithromycin  IVPB 500 milliGRAM(s) IV Intermittent every 24 hours  azithromycin  IVPB      chlorhexidine 0.12% Liquid 15 milliLiter(s) Oral Mucosa every 12 hours  chlorhexidine 2% Cloths 1 Application(s) Topical <User Schedule>  cisatracurium Injectable 20 milliGRAM(s) IV Push once  dextrose 5%. 1000 milliLiter(s) (50 mL/Hr) IV Continuous <Continuous>  dextrose 5%. 1000 milliLiter(s) (100 mL/Hr) IV Continuous <Continuous>  dextrose 50% Injectable 25 Gram(s) IV Push once  dextrose 50% Injectable 12.5 Gram(s) IV Push once  dextrose 50% Injectable 25 Gram(s) IV Push once  fentaNYL    Injectable 100 MICROGram(s) IV Push once  glucagon  Injectable 1 milliGRAM(s) IntraMuscular once  HYDROmorphone Infusion. 1 mG/Hr (1 mL/Hr) IV Continuous <Continuous>  insulin lispro (ADMELOG) corrective regimen sliding scale   SubCutaneous every 6 hours  insulin NPH human recombinant 3 Unit(s) SubCutaneous every 6 hours  meropenem  IVPB      meropenem  IVPB 1000 milliGRAM(s) IV Intermittent every 8 hours  methylPREDNISolone sodium succinate IVPB 1000 milliGRAM(s) IV Intermittent once  milrinone Infusion 0.375 MICROgram(s)/kG/Min (14.8 mL/Hr) IV Continuous <Continuous>  multivitamin  Chewable 1 Tablet(s) Chew daily  naloxegol 25 milliGRAM(s) Oral daily  norepinephrine Infusion 0.3 MICROgram(s)/kG/Min (36.9 mL/Hr) IV Continuous <Continuous>  pantoprazole  Injectable 40 milliGRAM(s) IV Push daily  phenylephrine    Infusion 1.5 MICROgram(s)/kG/Min (36.9 mL/Hr) IV Continuous <Continuous>  polyethylene glycol 3350 17 Gram(s) Oral two times a day  propofol Infusion 15.001 MICROgram(s)/kG/Min (11.8 mL/Hr) IV Continuous <Continuous>  senna Syrup 10 milliLiter(s) Oral two times a day  trimethoprim  40 mG/sulfamethoxazole 200 mG Suspension 160 milliGRAM(s) Oral daily  vancomycin  IVPB 1750 milliGRAM(s) IV Intermittent every 12 hours  vasopressin Infusion 0.06 Unit(s)/Min (9 mL/Hr) IV Continuous <Continuous>    MEDICATIONS  (PRN):  dextrose Oral Gel 15 Gram(s) Oral once PRN Blood Glucose LESS THAN 70 milliGRAM(s)/deciliter      Allergies: No Known Allergies        PAST MEDICAL & SURGICAL HISTORY:        Vital Signs Last 24 Hrs  T(C): 35.4 (14 Sep 2023 08:00), Max: 36.5 (13 Sep 2023 15:30)  T(F): 95.7 (14 Sep 2023 08:00), Max: 97.7 (13 Sep 2023 15:30)  HR: 51 (14 Sep 2023 09:00) (51 - 156)  BP: --  BP(mean): --  RR: 12 (14 Sep 2023 09:00) (0 - 25)  SpO2: 100% (14 Sep 2023 09:00) (93% - 100%)    Parameters below as of 14 Sep 2023 09:00  Patient On (Oxygen Delivery Method): ventilator    O2 Concentration (%): 80    Physical Exam:  General: Intubated,   HEENT: EOMI, MMM  CVS: +S1/S2, RRR, no murmurs/rubs/gallops  Resp: B/L crackles from upper to lower zones  GI: Soft, NT/ND +BS  MSK: no swelling/warmth/erythema of the joints of the UE/LE, periungual ulcers with mild rash on MCP and PIP joints     LABS:                        12.1   6.08  )-----------( 83       ( 14 Sep 2023 04:00 )             35.3     09-14    135  |  94<L>  |  32<H>  ----------------------------<  309<H>  4.4   |  20<L>  |  0.87    Ca    8.4      14 Sep 2023 04:00  Phos  3.3     09-14  Mg     2.20     09-14    TPro  4.8<L>  /  Alb  3.4  /  TBili  4.2<H>  /  DBili  2.8<H>  /  AST  663<H>  /  ALT  609<H>  /  AlkPhos  71  09-14    PT/INR - ( 14 Sep 2023 04:00 )   PT: 42.2 sec;   INR: 3.92 ratio         PTT - ( 14 Sep 2023 07:04 )  PTT:93.0 sec  Urinalysis Basic - ( 14 Sep 2023 04:00 )    Color: x / Appearance: x / SG: x / pH: x  Gluc: 309 mg/dL / Ketone: x  / Bili: x / Urobili: x   Blood: x / Protein: x / Nitrite: x   Leuk Esterase: x / RBC: x / WBC x   Sq Epi: x / Non Sq Epi: x / Bacteria: x      Culture - Bronchial (collected 09-13-23 @ 15:45)  Source: .Bronchial Bronchial Lavage  Gram Stain (09-14-23 @ 07:27):    Rare polymorphonuclear leukocytes seen per low power field    No squamous epithelial cells seen per low power field    No organisms seen per oil power field        Rheumatology Work Up    Creatine Kinase, Serum: 43 U/L [25 - 170] (09-11-23 @ 05:30)  Protein/Creatinine Ratio Calculation: 0.2 Ratio [0.0 - 0.2] (08-31-23 @ 07:41)  Sedimentation Rate, Erythrocyte: 26 mm/Hr *H* [[facility] performs Erythrocyte Sedimentation Rate assay  utilizing the Westergren method] (08-30-23 @ 06:08)  C-Reactive Protein, Serum: 18.1 mg/L *H* [0.0 - 4.0] (08-30-23 @ 06:08)    ARIANA 1:1280 Speckled  DsDNA <12  SM (Alejandro) Ab FBIA >8  Anti-RNP >8  c-ANCA Negative  p-ANCA Negative  Atypical ANCA Indeterminate  Anti-SS-A Ab 0.4  Anti-SS-B Ab <0.2  RF Quant <10  CCP Ab IgG Negative  Centromere Ab <0.2  RNA Polymerase III IgG 16    Systemic Sclerosis 12 Ab Pnl 2 (08- 05:30)  CCP <8  Scl 70 <11  CENP-A <11  RP11 <11   <11  U1-snRNP RNP A 162  U1-snRNP RNP C 71  U1-snRNP RNP-70kd 111  Fibrillarin <11  Th/To <11  PM/Scl-100 <11  PM/Scl-75 <11    Hypersensitivity Pneumonitis Panel – Negative (08- 6:06)    Bronchoscopy results (08- 12:54)  -	Fungitell B-D Galactomanan 93   -	Aspergillus Galactomannan Antigen 1.75  -	Appearance – Hazy  -	Color – No Color  -	Total Nucleated Cell Count – 19   -	Total RBC Count – 1123   -	Neutrophils 13  -	BF Lymphocytes 2  -	Monocytes/Macrophages 4   -	Cytopathology  o	Final Diagnosis  o	BRONCHOALVEOLAR LAVAGE, RIGHT LOWER LOBE:  o	NEGATIVE FOR MALIGNANT CELLS.  o	Pulmonary macrophages, bronchial cells and squamous cells.  -	Surgical Pathology  o	Final Diagnosis  o	Lung, right lower lobe; biopsy:  o	Bronchial tissue and interstitium showing organizing pneumonia and focal fibrin - See Note.  o	Note: Multiple deeper levels were performed on block 1A. The histologic findings are that of organizing pneumonia and focal fibrin. No granuloma, neoplasm or interstitial fibrosis is identified. Clinical and radiographic correlation is suggested. This case was also reviewed virtually at the [facility]acic Pathology Consensus Conference on 8/31/2023, and again virtually to Dr. Andino on 9/1/2023.  -	TM Interpretation  o	Diagnosis:  o	Bronchioalveolar Lavage  o	Flow cytometric analysis attempted, however insufficient cells to report.      BRONCHOSCOPY (08- 11:30)  Procedure Performed: Bronchoscopy with BAL and TBBx    The bronchoscope was inserted with ease and the airway examined to subsegments bilaterally. BAL performed in RB8 followed by TBBX performed in RB8 3 subsegments. Patient tolerated procedure well.    Limitations/Complications:  There were no apparent limitations or complications    Findings:  Normal distal trachea and main orlando. Scant mucous bilaterally. No endobronchial lesions. Mildly ectatic airways.    Impressions:  Normal anatomy, mildly ectatic airways and scant secretions. .    Plan:  CXR in endo may need higher supplemental oxygen few hours after procedure, wean off, incentive spirometry    Specimens:  BAL for cell count, diff, CD4/8, micro, GM, fungitell and cyto. TBBX for path      RADIOLOGY & ADDITIONAL STUDIES:      CT Angio Chest PE Protocol w/ IV Cont (08.26.23 @ 15:10)   FINDINGS: No visualized PE.The pulmonary trunk measures 3.3 cm diameter.     The heart is enlarged.     Trace left lateral asymmetric pericardial effusion is seen.     The great vessels are unremarkable. Left vertebral artery arises   directly from the aortic arch.     No mediastinal or hilar lymphadenopathy is seen.    Mediastinal lipomatosis.    Evaluation of the pulmonary parenchyma demonstrates bilateral   interstitial lung disease in the upper, mid and lower zones with   peripheral groundglass opacities and reticulation. Less involvement of   the bilateral upper zones. No traction bronchiectasis or honeycombing.    Bilateral air trapping.     No pleural effusions are seen.    Limited evaluation of the upper abdomen demonstrates 1.6 cm hypodensity   in segment one likely cyst. Small to moderate hiatal hernia.    Evaluation of the osseous structures demonstrates no significant   abnormality.      IMPRESSION:  No visualized PE.    Bilateral interstitial lung disease. Differential diagnosis includes  HP,NSIP, atypical pneumonia.      CT Chest No Cont (08.28.23 @ 16:47) >  IMPRESSION:  1. Since August 26, 2023, again interstitial lung disease non-UIP   pattern. Although no subpleural sparing, possibly interstitialpneumonia,   differential includes NSIP associated with connective tissue disorders,   chronic HP or drug toxicity.      CT Angio Chest PE Protocol w/ IV Cont (09.11.23 @ 13:45) >  IMPRESSION:  1.  Improved/decreased extent of bilateral groundglass opacities and   reticular markings compared to the previous study. Findings are   nonspecific but differential etiologies include interstitial pneumonia,   drug toxicity, nonspecific connective tissue disorders.  2.  New small focus of parenchymal consolidation seen in the lingula;   possible small focal pneumonia.  3.  No pulmonary embolism      < end of copied text >    BRONCHOSCOPY (2nd)   · Procedure Date/Time	13-Sep-2023 17:18  · Pre-Bronchoscopy Tuberculosis Risk Assessment Screening(check 'Preview' tab for full text on these list values when selected)	I evaluated the patient prior to bronchoscopy procedure for active pulmonary/laryngeal M. tuberculosis disease and the risk and actions taken:    Low risk with routine standard of care measures followed.  · Informed Consent	Benefits, risks, and possible complications of procedure explained to patient/caregiver who verbalized understanding and gave written consent.  · Procedure Performed By	Miky Borden  · Procedure Details	  Bronchoscope inserted through ETT. ETT noted to be in good position. Airway evaluation revealed mild thick yellow secretions in trachea. Diffuse moderate thin green/brown secretions throughout. Secretions therapeutically suctioned. BAL performed of lingula. Serial BAL x3 performed of RML with progressively bloody return.  Bronchoscope then withdrawn from ETT.    · Specimens	Cell Count, Gram Stain and Culture, Fungal Culture, Acid Fast Culture, Cytopathology

## 2023-09-14 NOTE — CONSULT NOTE ADULT - PROBLEM SELECTOR RECOMMENDATION 4
Pt is full code  Goal is to continue with all aggressive medical care  Will continue to follow and provide support  Page for uncontrolled symptoms [id number]

## 2023-09-14 NOTE — CHART NOTE - NSCHARTNOTEFT_GEN_A_CORE
Consent for plasma exchange obtained from Spouse. Plan to do every other day starting 9/15/23.  Perfusionist required for all procedures to do tandem PLEX with ECMO.  Blood center apheresis nurse aware.  Consult note to follow.

## 2023-09-14 NOTE — PHYSICAL THERAPY INITIAL EVALUATION ADULT - ADDITIONAL COMMENTS
Pt is intubated and sedated, history obtained from pts partner at bedside. Pt lives in an apartment with elevator access and has a private house in [place]. Pt did not use an assistive device and was independent with ADLs prior.   Pt left semi supine in bed in NAD, all lines intact, RN Kaleb made aware.

## 2023-09-14 NOTE — CHART NOTE - NSCHARTNOTEFT_GEN_A_CORE
Procedure: Transesophageal Echocardiogram  Indication:  ECMO cannula repositioning  Consent: Written  Operators: Michi    AV:  Trace AI.     MV:  Mild MR.    PV:  Normal.      TV:  Normal.    LV:  Normal LVSF without SWMA.     RV: RV smaller in LV in size. RV systolic function improved from yesterday    LA:   Normal. No thrombus noted in MEREDITH.    RA:  Normal.      SVC / IVC:  ECMO return cannula noted to be abutting interatrial septum. ECMO cannula withdrawn under RITCHIE guidance.     Aorta:  Normal.     PA:  Normal.     Doppler: LVOT VTI of 17 cm. PA AT 64 cm with biphasic wave    Other:  Agitated saline contrast study negative for PFO after withdrawal of ECMO drainage cannula. LVOT diameter of 2 cm. Consolidated lung noted bilaterally with dynamic air bronchograms.     Assessment    Normal LVSF. Improved RV size and systolic function  ECMO return cannula noted to be abutting interatrial septum. ECMO cannula withdrawn under RITCHIE guidance.   Agitated saline contrast study negative for PFO after withdrawal of ECMO drainage cannula.  Consolidated lung noted bilaterally with dynamic air bronchograms.     Images uploaded to Kita Borden MD  Pulmonary & Critical Care

## 2023-09-14 NOTE — CONSULT NOTE ADULT - ASSESSMENT
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 Initially admitted for acute hypoxemic respiratory failure and ILD-like findings, she responded to prednisone 40 mg daily but developed weakness/tachycardia with Mycophenolate mofetil 500mg BID (A fibr with RVR). Despite interventions, her condition worsened, intubated and ECMO transfer.    # SLE  # MCTD  # DAH  - Serology: ARIANA 1:1280 Speckled, DsDNA <12. 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 Initially admitted for acute hypoxemic respiratory failure and ILD-like findings, she responded to prednisone 40 mg daily but developed weakness/tachycardia with Mycophenolate mofetil 500mg BID (A fibr with RVR). Despite interventions, her condition worsened, intubated and ECMO transfer.    # SLE  # MCTD  # DAH  - Serology: ARIANA 1:1280 Speckled, dsDNA <12. + SM, + Anti-RNP, U1-snRNP RNP A 162, U1-snRNP RNP-70kd 111  - CT chest (8/28): Since August 26, 2023, again interstitial lung disease non-UIP pattern. Although no subpleural sparing, possibly interstitialpneumonia, differential includes NSIP associated with connective tissue disorders, chronic HP or drug toxicity.  - Bronchoscopy results (08/30): Bronchial tissue and interstitium showing organizing pneumonia and focal fibrin  - CT chest (9/11): Improved/decreased extent of bilateral groundglass opacities and reticular markings compared to the previous study. Findings are nonspecific but differential etiologies include interstitial pneumonia, drug toxicity, nonspecific connective tissue disorders. New small focus of parenchymal consolidation seen in the lingula;   possible small focal pneumonia.  - Repeat Bronchoscopy (9/13): BAL performed of lingula. Serial BAL x3 performed of RML with progressively bloody return.  - Lab showed thrombocytopenia since she developed respiratory failure ( normal on the first admission to [facility])       Recommendations:  - Please continue with solumedrol 1 gr daily for 3 days ( day 2 today)  - Please order following labs: Ferritin lupus anticoagulant, DRRVT, Anti B2 glycoprotein and anticardiolipin IgG, IgM, C3, C4.   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 Initially admitted for acute hypoxemic respiratory failure and ILD-like findings, she responded to prednisone 40 mg daily but developed weakness/tachycardia with Mycophenolate mofetil 500mg BID (A fibr with RVR). Despite interventions, her condition worsened, intubated and ECMO transfer.    # Acute severe hepatitis -likely due to hypoperfusion   # SLE/ MCTD with DAH  - Skin rash on chest with periungual ulcers, ILD, leukopenia, thrombocytopenia.   - Serology: ARIANA 1:1280 Speckled, dsDNA <12. + SM, + Anti-RNP, U1-snRNP RNP A 162, U1-snRNP RNP-70kd 111  - CT chest (8/28): Since August 26, 2023, again interstitial lung disease non-UIP pattern. Although no subpleural sparing, possibly interstitialpneumonia, differential includes NSIP associated with connective tissue disorders, chronic HP or drug toxicity.  - Bronchoscopy results (08/30): Bronchial tissue and interstitium showing organizing pneumonia and focal fibrin  - CT chest (9/11): Improved/decreased extent of bilateral groundglass opacities and reticular markings compared to the previous study. Findings are nonspecific but differential etiologies include interstitial pneumonia, drug toxicity, nonspecific connective tissue disorders. New small focus of parenchymal consolidation seen in the lingula;   possible small focal pneumonia.  - Repeat Bronchoscopy (9/13): BAL performed of lingula. Serial BAL x3 performed of RML with progressively bloody return.  - Lab showed thrombocytopenia since she developed respiratory failure ( normal on the first admission to [facility])   - CRP 18, ESR 26 (8/30)    Recommendations:  - Please continue with solumedrol 1 gr daily for 3 days ( day 2 today)  - Please order following labs: Anti B2 glycoprotein and anticardiolipin IgG, IgM, IgA  - Please consider to start plasmapheresis as a therapeutic option to rapidly remove autoantibodies and inflammatory factors from the patient's plasma due to severe DAH  - We plan to start Cyclophosphamide, but due to the patient's severe hepatitis, we will monitor their LFT tomorrow. Depending on the results, we may consider using Rituximab as an alternative immunosuppressive  - Please send viral hepatitis markers and QuantiFeron     D/w Dr. Luz Hollingsworth MD  PGY-4  Rheumatology Fellow  Reachable on TEAMS  [phone]

## 2023-09-15 LAB
ALBUMIN SERPL ELPH-MCNC: 3.1 G/DL — LOW (ref 3.3–5)
ALBUMIN SERPL ELPH-MCNC: 3.2 G/DL — LOW (ref 3.3–5)
ALBUMIN SERPL ELPH-MCNC: 3.3 G/DL — SIGNIFICANT CHANGE UP (ref 3.3–5)
ALP SERPL-CCNC: 69 U/L — SIGNIFICANT CHANGE UP (ref 40–120)
ALP SERPL-CCNC: 71 U/L — SIGNIFICANT CHANGE UP (ref 40–120)
ALP SERPL-CCNC: 73 U/L — SIGNIFICANT CHANGE UP (ref 40–120)
ALP SERPL-CCNC: 74 U/L — SIGNIFICANT CHANGE UP (ref 40–120)
ALP SERPL-CCNC: 76 U/L — SIGNIFICANT CHANGE UP (ref 40–120)
ALT FLD-CCNC: 661 U/L — HIGH (ref 4–33)
ALT FLD-CCNC: 781 U/L — HIGH (ref 4–33)
ALT FLD-CCNC: 850 U/L — HIGH (ref 4–33)
ALT FLD-CCNC: 854 U/L — HIGH (ref 4–33)
ALT FLD-CCNC: 965 U/L — HIGH (ref 4–33)
ANION GAP SERPL CALC-SCNC: 12 MMOL/L — SIGNIFICANT CHANGE UP (ref 7–14)
ANION GAP SERPL CALC-SCNC: 13 MMOL/L — SIGNIFICANT CHANGE UP (ref 7–14)
ANION GAP SERPL CALC-SCNC: 15 MMOL/L — HIGH (ref 7–14)
ANION GAP SERPL CALC-SCNC: 16 MMOL/L — HIGH (ref 7–14)
ANION GAP SERPL CALC-SCNC: 16 MMOL/L — HIGH (ref 7–14)
APTT BLD: 44.2 SEC — HIGH (ref 24.5–35.6)
APTT BLD: 45 SEC — HIGH (ref 24.5–35.6)
APTT BLD: 48.4 SEC — HIGH (ref 24.5–35.6)
APTT BLD: 55.8 SEC — HIGH (ref 24.5–35.6)
APTT BLD: 56.5 SEC — HIGH (ref 24.5–35.6)
APTT BLD: 57.4 SEC — HIGH (ref 24.5–35.6)
APTT BLD: 60.5 SEC — HIGH (ref 24.5–35.6)
AST SERPL-CCNC: 376 U/L — HIGH (ref 4–32)
AST SERPL-CCNC: 544 U/L — HIGH (ref 4–32)
AST SERPL-CCNC: 582 U/L — HIGH (ref 4–32)
AST SERPL-CCNC: 589 U/L — HIGH (ref 4–32)
AST SERPL-CCNC: 665 U/L — HIGH (ref 4–32)
B-OH-BUTYR SERPL-SCNC: 0.5 MMOL/L — HIGH (ref 0–0.4)
B2 GLYCOPROT1 AB SER QL: NEGATIVE — SIGNIFICANT CHANGE UP
BASOPHILS # BLD AUTO: 0 K/UL — SIGNIFICANT CHANGE UP (ref 0–0.2)
BASOPHILS NFR BLD AUTO: 0 % — SIGNIFICANT CHANGE UP (ref 0–2)
BILIRUB SERPL-MCNC: 2.8 MG/DL — HIGH (ref 0.2–1.2)
BILIRUB SERPL-MCNC: 4.2 MG/DL — HIGH (ref 0.2–1.2)
BILIRUB SERPL-MCNC: 4.6 MG/DL — HIGH (ref 0.2–1.2)
BILIRUB SERPL-MCNC: 4.7 MG/DL — HIGH (ref 0.2–1.2)
BILIRUB SERPL-MCNC: 4.8 MG/DL — HIGH (ref 0.2–1.2)
BLOOD GAS ARTERIAL - LYTES,HGB,ICA,LACT RESULT: SIGNIFICANT CHANGE UP
BLOOD GAS ECMO POST MEMBRANE - ARTERIAL RESULT: SIGNIFICANT CHANGE UP
BLOOD GAS ECMO POST MEMBRANE - ARTERIAL RESULT: SIGNIFICANT CHANGE UP
BLOOD GAS ECMO PRE MEMBRANE - VENOUS RESULT: SIGNIFICANT CHANGE UP
BLOOD GAS ECMO PRE MEMBRANE - VENOUS RESULT: SIGNIFICANT CHANGE UP
BUN SERPL-MCNC: 42 MG/DL — HIGH (ref 7–23)
BUN SERPL-MCNC: 42 MG/DL — HIGH (ref 7–23)
BUN SERPL-MCNC: 44 MG/DL — HIGH (ref 7–23)
BUN SERPL-MCNC: 44 MG/DL — HIGH (ref 7–23)
BUN SERPL-MCNC: 46 MG/DL — HIGH (ref 7–23)
CALCIUM SERPL-MCNC: 8.3 MG/DL — LOW (ref 8.4–10.5)
CALCIUM SERPL-MCNC: 8.3 MG/DL — LOW (ref 8.4–10.5)
CALCIUM SERPL-MCNC: 8.4 MG/DL — SIGNIFICANT CHANGE UP (ref 8.4–10.5)
CALCIUM SERPL-MCNC: 8.5 MG/DL — SIGNIFICANT CHANGE UP (ref 8.4–10.5)
CALCIUM SERPL-MCNC: 8.6 MG/DL — SIGNIFICANT CHANGE UP (ref 8.4–10.5)
CARDIOLIPIN AB SER-ACNC: NEGATIVE — SIGNIFICANT CHANGE UP
CHLORIDE SERPL-SCNC: 100 MMOL/L — SIGNIFICANT CHANGE UP (ref 98–107)
CHLORIDE SERPL-SCNC: 93 MMOL/L — LOW (ref 98–107)
CHLORIDE SERPL-SCNC: 94 MMOL/L — LOW (ref 98–107)
CHLORIDE SERPL-SCNC: 97 MMOL/L — LOW (ref 98–107)
CHLORIDE SERPL-SCNC: 99 MMOL/L — SIGNIFICANT CHANGE UP (ref 98–107)
CO2 SERPL-SCNC: 24 MMOL/L — SIGNIFICANT CHANGE UP (ref 22–31)
CO2 SERPL-SCNC: 25 MMOL/L — SIGNIFICANT CHANGE UP (ref 22–31)
CO2 SERPL-SCNC: 27 MMOL/L — SIGNIFICANT CHANGE UP (ref 22–31)
CO2 SERPL-SCNC: 28 MMOL/L — SIGNIFICANT CHANGE UP (ref 22–31)
CO2 SERPL-SCNC: 29 MMOL/L — SIGNIFICANT CHANGE UP (ref 22–31)
CREAT SERPL-MCNC: 0.82 MG/DL — SIGNIFICANT CHANGE UP (ref 0.5–1.3)
CREAT SERPL-MCNC: 0.83 MG/DL — SIGNIFICANT CHANGE UP (ref 0.5–1.3)
CREAT SERPL-MCNC: 0.85 MG/DL — SIGNIFICANT CHANGE UP (ref 0.5–1.3)
CREAT SERPL-MCNC: 0.87 MG/DL — SIGNIFICANT CHANGE UP (ref 0.5–1.3)
CREAT SERPL-MCNC: 0.93 MG/DL — SIGNIFICANT CHANGE UP (ref 0.5–1.3)
DRVVT RATIO: SIGNIFICANT CHANGE UP RATIO (ref 0–1.21)
DRVVT SCREEN TO CONFIRM RATIO: SIGNIFICANT CHANGE UP
EGFR: 69 ML/MIN/1.73M2 — SIGNIFICANT CHANGE UP
EGFR: 74 ML/MIN/1.73M2 — SIGNIFICANT CHANGE UP
EGFR: 76 ML/MIN/1.73M2 — SIGNIFICANT CHANGE UP
EGFR: 79 ML/MIN/1.73M2 — SIGNIFICANT CHANGE UP
EGFR: 80 ML/MIN/1.73M2 — SIGNIFICANT CHANGE UP
EOSINOPHIL # BLD AUTO: 0 K/UL — SIGNIFICANT CHANGE UP (ref 0–0.5)
EOSINOPHIL # BLD AUTO: 0 K/UL — SIGNIFICANT CHANGE UP (ref 0–0.5)
EOSINOPHIL # BLD AUTO: 0.01 K/UL — SIGNIFICANT CHANGE UP (ref 0–0.5)
EOSINOPHIL # BLD AUTO: 0.01 K/UL — SIGNIFICANT CHANGE UP (ref 0–0.5)
EOSINOPHIL NFR BLD AUTO: 0 % — SIGNIFICANT CHANGE UP (ref 0–6)
EOSINOPHIL NFR BLD AUTO: 0 % — SIGNIFICANT CHANGE UP (ref 0–6)
EOSINOPHIL NFR BLD AUTO: 0.2 % — SIGNIFICANT CHANGE UP (ref 0–6)
EOSINOPHIL NFR BLD AUTO: 0.6 % — SIGNIFICANT CHANGE UP (ref 0–6)
FIBRINOGEN PPP-MCNC: 237 MG/DL — SIGNIFICANT CHANGE UP (ref 200–465)
FUNGITELL B-D-GLUCAN,  BRONCHIAL LAVAGE: SIGNIFICANT CHANGE UP
GAS PNL BLDA: SIGNIFICANT CHANGE UP
GLUCOSE BLDC GLUCOMTR-MCNC: 144 MG/DL — HIGH (ref 70–99)
GLUCOSE BLDC GLUCOMTR-MCNC: 156 MG/DL — HIGH (ref 70–99)
GLUCOSE BLDC GLUCOMTR-MCNC: 163 MG/DL — HIGH (ref 70–99)
GLUCOSE BLDC GLUCOMTR-MCNC: 170 MG/DL — HIGH (ref 70–99)
GLUCOSE BLDC GLUCOMTR-MCNC: 178 MG/DL — HIGH (ref 70–99)
GLUCOSE BLDC GLUCOMTR-MCNC: 188 MG/DL — HIGH (ref 70–99)
GLUCOSE BLDC GLUCOMTR-MCNC: 204 MG/DL — HIGH (ref 70–99)
GLUCOSE BLDC GLUCOMTR-MCNC: 224 MG/DL — HIGH (ref 70–99)
GLUCOSE BLDC GLUCOMTR-MCNC: 246 MG/DL — HIGH (ref 70–99)
GLUCOSE BLDC GLUCOMTR-MCNC: 250 MG/DL — HIGH (ref 70–99)
GLUCOSE BLDC GLUCOMTR-MCNC: 252 MG/DL — HIGH (ref 70–99)
GLUCOSE BLDC GLUCOMTR-MCNC: 257 MG/DL — HIGH (ref 70–99)
GLUCOSE BLDC GLUCOMTR-MCNC: 267 MG/DL — HIGH (ref 70–99)
GLUCOSE BLDC GLUCOMTR-MCNC: 267 MG/DL — HIGH (ref 70–99)
GLUCOSE BLDC GLUCOMTR-MCNC: 307 MG/DL — HIGH (ref 70–99)
GLUCOSE SERPL-MCNC: 178 MG/DL — HIGH (ref 70–99)
GLUCOSE SERPL-MCNC: 279 MG/DL — HIGH (ref 70–99)
GLUCOSE SERPL-MCNC: 315 MG/DL — HIGH (ref 70–99)
GLUCOSE SERPL-MCNC: 359 MG/DL — HIGH (ref 70–99)
GLUCOSE SERPL-MCNC: 395 MG/DL — HIGH (ref 70–99)
HAPTOGLOB SERPL-MCNC: <20 MG/DL — LOW (ref 34–200)
HAV IGM SER-ACNC: SIGNIFICANT CHANGE UP
HBV CORE IGM SER-ACNC: SIGNIFICANT CHANGE UP
HBV SURFACE AG SER-ACNC: SIGNIFICANT CHANGE UP
HCT VFR BLD CALC: 34.4 % — LOW (ref 34.5–45)
HCT VFR BLD CALC: 34.5 % — SIGNIFICANT CHANGE UP (ref 34.5–45)
HCT VFR BLD CALC: 36.3 % — SIGNIFICANT CHANGE UP (ref 34.5–45)
HCT VFR BLD CALC: 36.5 % — SIGNIFICANT CHANGE UP (ref 34.5–45)
HCV AB S/CO SERPL IA: 0.04 S/CO — SIGNIFICANT CHANGE UP (ref 0–0.99)
HCV AB SERPL-IMP: SIGNIFICANT CHANGE UP
HGB BLD CALC-MCNC: 11.8 G/DL — SIGNIFICANT CHANGE UP (ref 11.7–16.1)
HGB BLD-MCNC: 11.7 G/DL — SIGNIFICANT CHANGE UP (ref 11.5–15.5)
HGB BLD-MCNC: 11.9 G/DL — SIGNIFICANT CHANGE UP (ref 11.5–15.5)
HGB BLD-MCNC: 12.2 G/DL — SIGNIFICANT CHANGE UP (ref 11.5–15.5)
HGB BLD-MCNC: 12.2 G/DL — SIGNIFICANT CHANGE UP (ref 11.5–15.5)
IANC: 1.42 K/UL — LOW (ref 1.8–7.4)
IANC: 2.33 K/UL — SIGNIFICANT CHANGE UP (ref 1.8–7.4)
IANC: 3.57 K/UL — SIGNIFICANT CHANGE UP (ref 1.8–7.4)
IANC: 3.75 K/UL — SIGNIFICANT CHANGE UP (ref 1.8–7.4)
IMM GRANULOCYTES NFR BLD AUTO: 0.4 % — SIGNIFICANT CHANGE UP (ref 0–0.9)
IMM GRANULOCYTES NFR BLD AUTO: 0.5 % — SIGNIFICANT CHANGE UP (ref 0–0.9)
IMM GRANULOCYTES NFR BLD AUTO: 0.8 % — SIGNIFICANT CHANGE UP (ref 0–0.9)
IMM GRANULOCYTES NFR BLD AUTO: 1.2 % — HIGH (ref 0–0.9)
INR BLD: 1.67 RATIO — HIGH (ref 0.85–1.18)
INR BLD: 1.98 RATIO — HIGH (ref 0.85–1.18)
INR BLD: 2.04 RATIO — HIGH (ref 0.85–1.18)
INR BLD: 2.17 RATIO — HIGH (ref 0.85–1.18)
INR BLD: 2.38 RATIO — HIGH (ref 0.85–1.18)
LDH SERPL L TO P-CCNC: 923 U/L — HIGH (ref 135–225)
LYMPHOCYTES # BLD AUTO: 0.08 K/UL — LOW (ref 1–3.3)
LYMPHOCYTES # BLD AUTO: 0.14 K/UL — LOW (ref 1–3.3)
LYMPHOCYTES # BLD AUTO: 0.17 K/UL — LOW (ref 1–3.3)
LYMPHOCYTES # BLD AUTO: 0.24 K/UL — LOW (ref 1–3.3)
LYMPHOCYTES # BLD AUTO: 3.1 % — LOW (ref 13–44)
LYMPHOCYTES # BLD AUTO: 4.3 % — LOW (ref 13–44)
LYMPHOCYTES # BLD AUTO: 5.8 % — LOW (ref 13–44)
LYMPHOCYTES # BLD AUTO: 8.1 % — LOW (ref 13–44)
MAGNESIUM SERPL-MCNC: 2.2 MG/DL — SIGNIFICANT CHANGE UP (ref 1.6–2.6)
MAGNESIUM SERPL-MCNC: 2.3 MG/DL — SIGNIFICANT CHANGE UP (ref 1.6–2.6)
MAGNESIUM SERPL-MCNC: 2.4 MG/DL — SIGNIFICANT CHANGE UP (ref 1.6–2.6)
MCHC RBC-ENTMCNC: 30.3 PG — SIGNIFICANT CHANGE UP (ref 27–34)
MCHC RBC-ENTMCNC: 31 PG — SIGNIFICANT CHANGE UP (ref 27–34)
MCHC RBC-ENTMCNC: 31.3 PG — SIGNIFICANT CHANGE UP (ref 27–34)
MCHC RBC-ENTMCNC: 31.5 PG — SIGNIFICANT CHANGE UP (ref 27–34)
MCHC RBC-ENTMCNC: 33.4 GM/DL — SIGNIFICANT CHANGE UP (ref 32–36)
MCHC RBC-ENTMCNC: 33.6 GM/DL — SIGNIFICANT CHANGE UP (ref 32–36)
MCHC RBC-ENTMCNC: 33.9 GM/DL — SIGNIFICANT CHANGE UP (ref 32–36)
MCHC RBC-ENTMCNC: 34.6 GM/DL — SIGNIFICANT CHANGE UP (ref 32–36)
MCV RBC AUTO: 90.5 FL — SIGNIFICANT CHANGE UP (ref 80–100)
MCV RBC AUTO: 90.6 FL — SIGNIFICANT CHANGE UP (ref 80–100)
MCV RBC AUTO: 92.4 FL — SIGNIFICANT CHANGE UP (ref 80–100)
MCV RBC AUTO: 92.7 FL — SIGNIFICANT CHANGE UP (ref 80–100)
METHGB MFR BLDV: 0.6 % — SIGNIFICANT CHANGE UP (ref 0–1.5)
MONOCYTES # BLD AUTO: 0.12 K/UL — SIGNIFICANT CHANGE UP (ref 0–0.9)
MONOCYTES # BLD AUTO: 0.12 K/UL — SIGNIFICANT CHANGE UP (ref 0–0.9)
MONOCYTES # BLD AUTO: 0.13 K/UL — SIGNIFICANT CHANGE UP (ref 0–0.9)
MONOCYTES # BLD AUTO: 0.19 K/UL — SIGNIFICANT CHANGE UP (ref 0–0.9)
MONOCYTES NFR BLD AUTO: 2.9 % — SIGNIFICANT CHANGE UP (ref 2–14)
MONOCYTES NFR BLD AUTO: 4.7 % — SIGNIFICANT CHANGE UP (ref 2–14)
MONOCYTES NFR BLD AUTO: 4.8 % — SIGNIFICANT CHANGE UP (ref 2–14)
MONOCYTES NFR BLD AUTO: 7.6 % — SIGNIFICANT CHANGE UP (ref 2–14)
NEUTROPHILS # BLD AUTO: 1.42 K/UL — LOW (ref 1.8–7.4)
NEUTROPHILS # BLD AUTO: 2.33 K/UL — SIGNIFICANT CHANGE UP (ref 1.8–7.4)
NEUTROPHILS # BLD AUTO: 3.57 K/UL — SIGNIFICANT CHANGE UP (ref 1.8–7.4)
NEUTROPHILS # BLD AUTO: 3.75 K/UL — SIGNIFICANT CHANGE UP (ref 1.8–7.4)
NEUTROPHILS NFR BLD AUTO: 82.5 % — HIGH (ref 43–77)
NEUTROPHILS NFR BLD AUTO: 90.1 % — HIGH (ref 43–77)
NEUTROPHILS NFR BLD AUTO: 90.6 % — HIGH (ref 43–77)
NEUTROPHILS NFR BLD AUTO: 91.8 % — HIGH (ref 43–77)
NRBC # BLD: 0 /100 WBCS — SIGNIFICANT CHANGE UP (ref 0–0)
NRBC # BLD: 0 /100 WBCS — SIGNIFICANT CHANGE UP (ref 0–0)
NRBC # BLD: 2 /100 WBCS — HIGH (ref 0–0)
NRBC # BLD: 4 /100 WBCS — HIGH (ref 0–0)
NRBC # FLD: 0 K/UL — SIGNIFICANT CHANGE UP (ref 0–0)
NRBC # FLD: 0 K/UL — SIGNIFICANT CHANGE UP (ref 0–0)
NRBC # FLD: 0.04 K/UL — HIGH (ref 0–0)
NRBC # FLD: 0.07 K/UL — HIGH (ref 0–0)
PHOSPHATE SERPL-MCNC: 2.2 MG/DL — LOW (ref 2.5–4.5)
PHOSPHATE SERPL-MCNC: 2.4 MG/DL — LOW (ref 2.5–4.5)
PHOSPHATE SERPL-MCNC: 2.4 MG/DL — LOW (ref 2.5–4.5)
PHOSPHATE SERPL-MCNC: 2.9 MG/DL — SIGNIFICANT CHANGE UP (ref 2.5–4.5)
PHOSPHATE SERPL-MCNC: 3.2 MG/DL — SIGNIFICANT CHANGE UP (ref 2.5–4.5)
PLATELET # BLD AUTO: 58 K/UL — LOW (ref 150–400)
PLATELET # BLD AUTO: 65 K/UL — LOW (ref 150–400)
PLATELET # BLD AUTO: 66 K/UL — LOW (ref 150–400)
PLATELET # BLD AUTO: 75 K/UL — LOW (ref 150–400)
POTASSIUM SERPL-MCNC: 3.4 MMOL/L — LOW (ref 3.5–5.3)
POTASSIUM SERPL-MCNC: 3.6 MMOL/L — SIGNIFICANT CHANGE UP (ref 3.5–5.3)
POTASSIUM SERPL-MCNC: 3.6 MMOL/L — SIGNIFICANT CHANGE UP (ref 3.5–5.3)
POTASSIUM SERPL-MCNC: 3.8 MMOL/L — SIGNIFICANT CHANGE UP (ref 3.5–5.3)
POTASSIUM SERPL-MCNC: 3.9 MMOL/L — SIGNIFICANT CHANGE UP (ref 3.5–5.3)
POTASSIUM SERPL-SCNC: 3.4 MMOL/L — LOW (ref 3.5–5.3)
POTASSIUM SERPL-SCNC: 3.6 MMOL/L — SIGNIFICANT CHANGE UP (ref 3.5–5.3)
POTASSIUM SERPL-SCNC: 3.6 MMOL/L — SIGNIFICANT CHANGE UP (ref 3.5–5.3)
POTASSIUM SERPL-SCNC: 3.8 MMOL/L — SIGNIFICANT CHANGE UP (ref 3.5–5.3)
POTASSIUM SERPL-SCNC: 3.9 MMOL/L — SIGNIFICANT CHANGE UP (ref 3.5–5.3)
PROT SERPL-MCNC: 4.7 G/DL — LOW (ref 6–8.3)
PROT SERPL-MCNC: 4.7 G/DL — LOW (ref 6–8.3)
PROT SERPL-MCNC: 5.2 G/DL — LOW (ref 6–8.3)
PROT SERPL-MCNC: 5.3 G/DL — LOW (ref 6–8.3)
PROT SERPL-MCNC: 5.3 G/DL — LOW (ref 6–8.3)
PROTHROM AB SERPL-ACNC: 18.6 SEC — HIGH (ref 9.5–13)
PROTHROM AB SERPL-ACNC: 21.9 SEC — HIGH (ref 9.5–13)
PROTHROM AB SERPL-ACNC: 22.6 SEC — HIGH (ref 9.5–13)
PROTHROM AB SERPL-ACNC: 24 SEC — HIGH (ref 9.5–13)
PROTHROM AB SERPL-ACNC: 26 SEC — HIGH (ref 9.5–13)
RBC # BLD: 3.72 M/UL — LOW (ref 3.8–5.2)
RBC # BLD: 3.8 M/UL — SIGNIFICANT CHANGE UP (ref 3.8–5.2)
RBC # BLD: 3.93 M/UL — SIGNIFICANT CHANGE UP (ref 3.8–5.2)
RBC # BLD: 4.03 M/UL — SIGNIFICANT CHANGE UP (ref 3.8–5.2)
RBC # FLD: 14.9 % — HIGH (ref 10.3–14.5)
RBC # FLD: 15 % — HIGH (ref 10.3–14.5)
RBC # FLD: 15 % — HIGH (ref 10.3–14.5)
RBC # FLD: 15.2 % — HIGH (ref 10.3–14.5)
SODIUM SERPL-SCNC: 131 MMOL/L — LOW (ref 135–145)
SODIUM SERPL-SCNC: 133 MMOL/L — LOW (ref 135–145)
SODIUM SERPL-SCNC: 137 MMOL/L — SIGNIFICANT CHANGE UP (ref 135–145)
SODIUM SERPL-SCNC: 143 MMOL/L — SIGNIFICANT CHANGE UP (ref 135–145)
SODIUM SERPL-SCNC: 144 MMOL/L — SIGNIFICANT CHANGE UP (ref 135–145)
TRIGL SERPL-MCNC: 406 MG/DL — HIGH
TRIGL SERPL-MCNC: 835 MG/DL — HIGH
WBC # BLD: 1.72 K/UL — LOW (ref 3.8–10.5)
WBC # BLD: 2.54 K/UL — LOW (ref 3.8–10.5)
WBC # BLD: 3.96 K/UL — SIGNIFICANT CHANGE UP (ref 3.8–10.5)
WBC # BLD: 4.14 K/UL — SIGNIFICANT CHANGE UP (ref 3.8–10.5)
WBC # FLD AUTO: 1.72 K/UL — LOW (ref 3.8–10.5)
WBC # FLD AUTO: 2.54 K/UL — LOW (ref 3.8–10.5)
WBC # FLD AUTO: 3.96 K/UL — SIGNIFICANT CHANGE UP (ref 3.8–10.5)
WBC # FLD AUTO: 4.14 K/UL — SIGNIFICANT CHANGE UP (ref 3.8–10.5)

## 2023-09-15 PROCEDURE — 99221 1ST HOSP IP/OBS SF/LOW 40: CPT | Mod: 25

## 2023-09-15 PROCEDURE — 76705 ECHO EXAM OF ABDOMEN: CPT | Mod: 26

## 2023-09-15 PROCEDURE — 36514 APHERESIS PLASMA: CPT

## 2023-09-15 PROCEDURE — 93308 TTE F-UP OR LMTD: CPT | Mod: 26

## 2023-09-15 PROCEDURE — 76604 US EXAM CHEST: CPT | Mod: 26

## 2023-09-15 PROCEDURE — 71045 X-RAY EXAM CHEST 1 VIEW: CPT | Mod: 26

## 2023-09-15 PROCEDURE — 99292 CRITICAL CARE ADDL 30 MIN: CPT | Mod: 25

## 2023-09-15 PROCEDURE — 99291 CRITICAL CARE FIRST HOUR: CPT | Mod: 25

## 2023-09-15 PROCEDURE — 99233 SBSQ HOSP IP/OBS HIGH 50: CPT | Mod: GC

## 2023-09-15 PROCEDURE — 33948 ECMO/ECLS DAILY MGMT-VENOUS: CPT

## 2023-09-15 RX ORDER — POTASSIUM PHOSPHATE, MONOBASIC POTASSIUM PHOSPHATE, DIBASIC 236; 224 MG/ML; MG/ML
15 INJECTION, SOLUTION INTRAVENOUS ONCE
Refills: 0 | Status: COMPLETED | OUTPATIENT
Start: 2023-09-15 | End: 2023-09-15

## 2023-09-15 RX ORDER — FUROSEMIDE 40 MG
40 TABLET ORAL ONCE
Refills: 0 | Status: COMPLETED | OUTPATIENT
Start: 2023-09-15 | End: 2023-09-15

## 2023-09-15 RX ORDER — POTASSIUM CHLORIDE 20 MEQ
40 PACKET (EA) ORAL ONCE
Refills: 0 | Status: COMPLETED | OUTPATIENT
Start: 2023-09-15 | End: 2023-09-15

## 2023-09-15 RX ORDER — FUROSEMIDE 40 MG
20 TABLET ORAL ONCE
Refills: 0 | Status: COMPLETED | OUTPATIENT
Start: 2023-09-15 | End: 2023-09-15

## 2023-09-15 RX ORDER — POTASSIUM CHLORIDE 20 MEQ
10 PACKET (EA) ORAL ONCE
Refills: 0 | Status: COMPLETED | OUTPATIENT
Start: 2023-09-15 | End: 2023-09-15

## 2023-09-15 RX ORDER — INSULIN HUMAN 100 [IU]/ML
7 INJECTION, SOLUTION SUBCUTANEOUS
Qty: 50 | Refills: 0 | Status: DISCONTINUED | OUTPATIENT
Start: 2023-09-15 | End: 2023-09-15

## 2023-09-15 RX ORDER — POLYETHYLENE GLYCOL 3350 17 G/17G
17 POWDER, FOR SOLUTION ORAL
Refills: 0 | Status: DISCONTINUED | OUTPATIENT
Start: 2023-09-15 | End: 2023-09-24

## 2023-09-15 RX ORDER — SODIUM,POTASSIUM PHOSPHATES 278-250MG
2 POWDER IN PACKET (EA) ORAL ONCE
Refills: 0 | Status: COMPLETED | OUTPATIENT
Start: 2023-09-15 | End: 2023-09-15

## 2023-09-15 RX ORDER — INSULIN HUMAN 100 [IU]/ML
1.5 INJECTION, SOLUTION SUBCUTANEOUS
Qty: 100 | Refills: 0 | Status: DISCONTINUED | OUTPATIENT
Start: 2023-09-15 | End: 2023-09-19

## 2023-09-15 RX ORDER — CALCIUM GLUCONATE 100 MG/ML
2 VIAL (ML) INTRAVENOUS ONCE
Refills: 0 | Status: COMPLETED | OUTPATIENT
Start: 2023-09-15 | End: 2023-09-15

## 2023-09-15 RX ADMIN — Medication 2 PACKET(S): at 22:28

## 2023-09-15 RX ADMIN — Medication 3 MILLILITER(S): at 10:49

## 2023-09-15 RX ADMIN — POLYETHYLENE GLYCOL 3350 17 GRAM(S): 17 POWDER, FOR SOLUTION ORAL at 18:07

## 2023-09-15 RX ADMIN — Medication 40 MILLIEQUIVALENT(S): at 22:28

## 2023-09-15 RX ADMIN — PANTOPRAZOLE SODIUM 40 MILLIGRAM(S): 20 TABLET, DELAYED RELEASE ORAL at 11:20

## 2023-09-15 RX ADMIN — Medication 200 GRAM(S): at 13:00

## 2023-09-15 RX ADMIN — ARGATROBAN 0.38 MICROGRAM(S)/KG/MIN: 50 INJECTION, SOLUTION INTRAVENOUS at 04:19

## 2023-09-15 RX ADMIN — NALOXEGOL OXALATE 25 MILLIGRAM(S): 12.5 TABLET, FILM COATED ORAL at 11:19

## 2023-09-15 RX ADMIN — HYDROMORPHONE HYDROCHLORIDE 1 MG/HR: 2 INJECTION INTRAMUSCULAR; INTRAVENOUS; SUBCUTANEOUS at 07:34

## 2023-09-15 RX ADMIN — Medication 100 MILLIEQUIVALENT(S): at 18:08

## 2023-09-15 RX ADMIN — Medication 3 MILLILITER(S): at 21:28

## 2023-09-15 RX ADMIN — MEROPENEM 100 MILLIGRAM(S): 1 INJECTION INTRAVENOUS at 04:15

## 2023-09-15 RX ADMIN — Medication 36.9 MICROGRAM(S)/KG/MIN: at 19:50

## 2023-09-15 RX ADMIN — POTASSIUM PHOSPHATE, MONOBASIC POTASSIUM PHOSPHATE, DIBASIC 62.5 MILLIMOLE(S): 236; 224 INJECTION, SOLUTION INTRAVENOUS at 06:00

## 2023-09-15 RX ADMIN — Medication 8: at 06:15

## 2023-09-15 RX ADMIN — Medication 20 MILLIGRAM(S): at 01:04

## 2023-09-15 RX ADMIN — Medication 50 MILLIGRAM(S): at 12:17

## 2023-09-15 RX ADMIN — Medication 40 MILLIGRAM(S): at 06:11

## 2023-09-15 RX ADMIN — MEROPENEM 100 MILLIGRAM(S): 1 INJECTION INTRAVENOUS at 11:20

## 2023-09-15 RX ADMIN — CHLORHEXIDINE GLUCONATE 15 MILLILITER(S): 213 SOLUTION TOPICAL at 05:01

## 2023-09-15 RX ADMIN — SENNA PLUS 10 MILLILITER(S): 8.6 TABLET ORAL at 18:07

## 2023-09-15 RX ADMIN — Medication 40 MILLIEQUIVALENT(S): at 18:07

## 2023-09-15 RX ADMIN — POLYETHYLENE GLYCOL 3350 17 GRAM(S): 17 POWDER, FOR SOLUTION ORAL at 21:34

## 2023-09-15 RX ADMIN — HYDROMORPHONE HYDROCHLORIDE 1.5 MG/HR: 2 INJECTION INTRAMUSCULAR; INTRAVENOUS; SUBCUTANEOUS at 19:49

## 2023-09-15 RX ADMIN — INSULIN HUMAN 3 UNIT(S)/HR: 100 INJECTION, SOLUTION SUBCUTANEOUS at 19:51

## 2023-09-15 RX ADMIN — SENNA PLUS 10 MILLILITER(S): 8.6 TABLET ORAL at 05:02

## 2023-09-15 RX ADMIN — Medication 3 MILLILITER(S): at 17:03

## 2023-09-15 RX ADMIN — ARGATROBAN 1.53 MICROGRAM(S)/KG/MIN: 50 INJECTION, SOLUTION INTRAVENOUS at 19:50

## 2023-09-15 RX ADMIN — CHLORHEXIDINE GLUCONATE 1 APPLICATION(S): 213 SOLUTION TOPICAL at 05:02

## 2023-09-15 RX ADMIN — Medication 3 MILLILITER(S): at 04:18

## 2023-09-15 RX ADMIN — HUMAN INSULIN 9 UNIT(S): 100 INJECTION, SUSPENSION SUBCUTANEOUS at 06:16

## 2023-09-15 RX ADMIN — Medication 160 MILLIGRAM(S): at 11:20

## 2023-09-15 RX ADMIN — PROPOFOL 11.8 MICROGRAM(S)/KG/MIN: 10 INJECTION, EMULSION INTRAVENOUS at 19:49

## 2023-09-15 RX ADMIN — Medication 36.9 MICROGRAM(S)/KG/MIN: at 07:34

## 2023-09-15 RX ADMIN — MEROPENEM 100 MILLIGRAM(S): 1 INJECTION INTRAVENOUS at 21:34

## 2023-09-15 RX ADMIN — PROPOFOL 11.8 MICROGRAM(S)/KG/MIN: 10 INJECTION, EMULSION INTRAVENOUS at 07:34

## 2023-09-15 RX ADMIN — CHLORHEXIDINE GLUCONATE 15 MILLILITER(S): 213 SOLUTION TOPICAL at 18:07

## 2023-09-15 RX ADMIN — POLYETHYLENE GLYCOL 3350 17 GRAM(S): 17 POWDER, FOR SOLUTION ORAL at 05:02

## 2023-09-15 NOTE — PROGRESS NOTE ADULT - ASSESSMENT
63 yo F w/ Afib initially admitted to [facility] on 8/26 after p/w SOB and found to be hypoxemic, reported having  SOB as well as debilitating b/l hand cramps and some muscles weakness several months. Found to be hypoxic and have interstitial lung disease appearance on CT, admitted 8/26 for AHRF, further ILD workup and management,  started on prednisone on admission with gradually improving O2 requirement and has been stable on 2-3LNC since 9/3, underwent bronchoscopy with TBBX on 8/30 which showed Non-Specific Intersitial Disease (NSIP)/OP. Labs notable for positive ARIANA 1:1280, RNP > 8, Alejandro > 8. Patient was started on steroids and recieved cellcept, which was discontinued 2/2 tachycardia. Interval CTA chest on 9/11 also negative PE  but showed possible lingula pneumonia.  Started on empiric vancomycin and zosyn 9/12, course was then c/b episode of SVT to 170s w/ rapidly progressive hypoxemic respiratory failure, placed NRB offered HFNC/BiPAP but refused, leading to worsening hypoxemic respiratory failure requiring intubation  9/13. Despite best practices, patient remained hypoxemic and patient was cannulated for VV-ECMO on 9/13 and transferred to [facility].      NEUROLOGY  Home meds: gabapentin 100g TID  AA&Ox3 at baseline   - continue sedation with propfol and dilaudid i/s/o severe hypoxemic respiratory failure   - low threshold to paralyze if dysynchronus,   - received cisatracurium IVP given for procedures yesterday  - [facility] 9/14 no acute findings  - Palliative following  - PT consult     PULMONARY  Acute hypoxemic respiratory failure 2/2 bacterial PNA vs atypical PNA vs aspiration vs AEILD. Intubated on 9/13  requiring VV ECMO.  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   #Acute on Chronic Hypoxemic Respiratory Failure  # VV ECMO  - Current settings: PC  RR 12, PS 12, PEEP 12 Fi02 80% Ppeak: 24 pulling TV ~40  - Emergency settings:   - Bronchoscopy showed mild thick yellow secretions in trachea, diffuse moderate thin green/brown secretions throughout, follow up BAL cultures  - airway clearance to assist facilitate secretion mobilization with IPV and duonebs  - c/w empiric abx   - f/u BAL cultures, urine strep and legionella      ECMO  VV ECMO		  Cannulation sites/dates: 9/13/23 R fem 25F. RIJ 19F   Flow: 3.86		P1: 154		Delta P: 23  RPM: 2800		P2: 132		SVO2: 83  Sweep:   4     L/min:		FIO2:   100      ECMO Calculated CO:  5.5  DO2/VO2: .15  VO2/DO2: 6.4    - right groin cannula pulled-back ~3-4cm to position tip of cannula just inferior to hepatic vein.  - clinically perfusing. Lactate: downtrending 7.4 > 6.1  - ECMO sweep sighing q1hr  - Adjust circuit flow as tolerated with DO2:VO2 goal >2  - Monitor for hemolysis, chatter, evidence of recirculation,       CARDIOVASCULAR  #Tachyarrhythmia  #AF RVR  #Distributive Shock  Hx of Afib w at OSH, started on Amiodarone gtt now in shock state requiring low dose pressors most likely vasoplegic in the setting sedation, now with bradycardia   Shock state requiring pressors   - likely septic with component of vaspoplegia i/s/o sedation, possible contribution of cardiogenic from RV dysfunction  - c/w norepinephrine, will wean off Roosevelt today maintaining map >65 and continue Vasopressin for now while on NO2  - discontinue amio gtt iso bradycardia  - TTE 9/12 with EF 65-70% with normal LV and RV  - TTE shows EF 18%. Severe global LV systolic dysfunction. RV enlargement with decreased RV systolic function  - POCUS:  Normal LVSF. RV systolic function improved from yesterday  - CT PE negative for PE    #Concern for Right Heart dysfunction  #Mild pHTN  - Likely i/s/o hypoxic vasoconstriction and high PEEP  - s/p milrinone gtt, d/c'd 9/13  - No PE seen on invasive pulmonary angiography at time of ECMO cannulation  - currently on NO2 40ppm, will wean while watching RV function       RENAL  sCr baseline 0.78  - Creatinine wnl, 0.77 today   - indwelling catheter in place, making good UO 40-100ml/hr  - monitor i/o's, positive +2.5L LOS/ negative -670ml 24hrs  - monitor, Replete to K>4, Phos>3, Mg>2, iCa>1      GASTROINTESTINAL  # Nutrition  #Transaminase elevation  - LFT's elevated, now slightly downtrending, however T.bili is high and uptrending, likely 2/2 combination of shock liver and malposition of ECMO cannula, if not improving can consider pulling back ECMO cannula  - NGT in place, can start tube feeds and increase to goal rate as tolerated  - continue PPI iso steroids   - continue bowel regimen senna and miralax and naloxogel to avoid opioid induced constipation  - nutrition following    INFECTIOUS DISEASE  No leukocytosis, afebrile  - c/w empiric donald (9/13- ) vancomycin (9/14- ) pending mrsa pcr and azithro pending Ulegionella  - c/w Bactrim DS for PJP prophylaxis  - 9/13 UA negative  - f/u BAL cyto, cell count, cultures, PCP PCR, fungitell, aspergillus  - f/u blood and urine cultures, MRSA PCR, urine legionella. RVP negative      ENDOCRINE  # Hyperglycemia i/s/o steroids  Admission A1c 6.1  - blood glucose 200-300's  - hyperglycemia iso steroids   - can add NPH, increase to 5units q6 and mod ISS  - monitor closely and adjust as needed      HEME  # ECMO anticoagulation  - continue argatroban gtt goal aPTT 50-70  - INR elevated likely iso argatroban, however will give dose Vit. K  - Hgb stable 11.3  - Transfuse for Hgb <7, Plt<10    #R/O DVT  - LE duplex negative for DVT but will repeat  - f/u duplex    MSK  #Hx Sciatica  - c/o B/l hand and arm weakness and numbness ?2/2 to radiculopathy, unnclear etiology, likely myositis since pt has been having worsening weakness with movement of extremities vs Cellcept s/e. Symptoms improving subjectively.  - f/u with Dr. Nik Marie or Dante Urbano for EMG upon discharge (osh?)      RHEUM  - CT findings at OSH consistent with ILD   - CT findings, Improved/decreased extent of bilateral groundglass opacities and reticular markings compared to the previous study. Findings are nonspecific but differential etiologies include interstitial pneumonia, drug toxicity, nonspecific connective tissue disorders.  - OSH labs show strongly positive ARIANA, RNP, and anti smith antibodies with low C4 and normal C3. Patient with negative SSa and SSb, negative DsDNa,  - Rheum consult  - s/p 1g solumedrol - first dose on 9/13, plan for 1gm dose of solumedrol again today 9/14, and 1gm dose tomorrow 9/15  Ferritin lupus anticoagulant, DRRVT, Anti B2 glycoprotein and anticardiolipin IgG, IgM, C3, C4.   - Plan for Plex plasmapheresis      PROPHYLAXIS  # DVT: argatroban  # GI: TF      LINES  -Ecmo Cannulas  -[facility] TLC- 9/13 (placed at OSH)  -Left Axill Zortman - 9/13 (placed at OSH)      GOC  -Palliative Following    DISPO: ICU  CODE STATUS: Full   61 yo F w/ Afib initially presented to urgent care for SOB where she had an XR done concerning for b/l lower infiltrates, sent to [facility] ED and  admitted to [facility] on 8/26 after being found to be hypoxemic, reported having  debilitating b/l hand cramps and some muscles weakness several months. Found to be hypoxic and have interstitial lung disease appearance on CT, admitted 8/26 for AHRF, further ILD workup and management,  started on prednisone on admission with gradually improving O2 requirement and has been stable on 2-3LNC since 9/3, underwent bronchoscopy with TBBX on 8/30 which showed Non-Specific Intersitial Disease (NSIP)/OP. Labs notable for positive ARIANA 1:1280, RNP > 8, Alejandro > 8. Patient was started on steroids and recieved cellcept, which was discontinued 2/2 tachycardia. Interval CTA chest on 9/11 also negative PE  but showed possible lingula pneumonia.  Started on empiric vancomycin and zosyn 9/12, course was then c/b episode of SVT to 170s w/ rapidly progressive hypoxemic respiratory failure, placed NRB offered HFNC/BiPAP but refused, leading to worsening hypoxemic respiratory failure requiring intubation  9/13. Despite best practices, patient remained hypoxemic and patient was cannulated for VV-ECMO on 9/13 and transferred to [facility].      NEUROLOGY  Home meds: gabapentin 100g TID  AA&Ox3 at baseline   - continue sedation with propfol and dilaudid i/s/o severe hypoxemic respiratory failure   - during sedation vacation yesterday, opened eyes, moved LE and R upper arm spontaneously but not following commands  - received cisatracurium IVP given for procedures yesterday  - low threshold to paralyze if dysynchronus   - CT 9/14 no acute findings  - Neurology following at OSH for pain and numbness of upper extremities x3 mos likely  neuropathy or myopathy r/t  underlying rheum condition, planned for EMG outpt w/Dr. Marie or Dr. Urbano  - Palliative following  - Social Work consult  - PT consult     PULMONARY  Acute hypoxemic respiratory failure 2/2 bacterial PNA vs atypical PNA vs aspiration vs AEILD. Intubated on 9/13  requiring VV ECMO. Patient had reported chronic SOB for several months, had a CT scan that was negative for PE. She also states that she was seen by a pulmonologist and rheumatology, where they ruled out lupus and other immunological disorders.  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   #Acute on Chronic Hypoxemic Respiratory Failure  # VV ECMO  - Current settings: PC  RR 12, PS 12, PEEP 12 Fi02 80% Ppeak: 24 pulling TV ~40  - Emergency settings:   - Bronchoscopy showed mild thick yellow secretions in trachea, diffuse moderate thin green/brown secretions throughout, follow up BAL cultures  - airway clearance to assist facilitate secretion mobilization with IPV and duonebs  - c/w empiric abx   - f/u BAL cultures, urine strep and legionella      ECMO  VV ECMO		  Cannulation sites/dates: 9/13/23 R fem 25F. RIJ 19F   Flow: 3.75		P1: 154		Delta P: 23  RPM: 2880		P2: 132		SVO2: 83  Sweep:   4     L/min:		FIO2:   100      ECMO Calculated CO:  5.5  DO2/VO2: .15  VO2/DO2: 6.4    - right groin cannula pulled-back ~3-4cm to position tip of cannula just inferior to hepatic vein.  - clinically perfusing. Lactate: downtrending 7.4 > 6.1  - ECMO sweep sighing q1hr  - Adjust circuit flow as tolerated with DO2:VO2 goal >2  - Monitor for hemolysis, chatter, evidence of recirculation,       CARDIOVASCULAR  #Tachyarrhythmia  #AF RVR  #Distributive Shock  Hx of Afib w at OSH, started on Amiodarone gtt now in shock state requiring low dose pressors most likely vasoplegic in the setting sedation, now with bradycardia   Shock state requiring pressors   - likely septic with component of vaspoplegia i/s/o sedation, possible contribution of cardiogenic from RV dysfunction  - c/w norepinephrine, will wean off Roosevelt today maintaining map >65 and continue Vasopressin for now while on NO2  - discontinue amio gtt iso bradycardia  - TTE 9/12 with EF 65-70% with normal LV and RV  - TTE shows EF 18%. Severe global LV systolic dysfunction. RV enlargement with decreased RV systolic function  - POCUS:  Normal LVSF. RV systolic function improved from yesterday  - CT PE negative for PE    #Concern for Right Heart dysfunction  #Mild pHTN  - Likely i/s/o hypoxic vasoconstriction and high PEEP  - s/p milrinone gtt, d/c'd 9/13  - No PE seen on invasive pulmonary angiography at time of ECMO cannulation  - currently on NO2 40ppm, will wean while watching RV function       RENAL  sCr baseline 0.78  - Creatinine wnl, 0.77 today   - indwelling catheter in place, making good UO 40-100ml/hr  - monitor i/o's, positive +2.5L LOS/ negative -670ml 24hrs  - monitor, Replete to K>4, Phos>3, Mg>2, iCa>1      GASTROINTESTINAL  # Nutrition  #Transaminase elevation  - LFT's elevated, now slightly downtrending, however T.bili is high and uptrending, likely 2/2 combination of shock liver and malposition of ECMO cannula, if not improving can consider pulling back ECMO cannula  - NGT in place, can start tube feeds and increase to goal rate as tolerated  - continue PPI iso steroids   - continue bowel regimen senna and miralax and naloxogel to avoid opioid induced constipation  - nutrition following    INFECTIOUS DISEASE  No leukocytosis, afebrile  - c/w empiric donald (9/13- ) vancomycin (9/14- ) pending mrsa pcr and azithro pending Ulegionella  - c/w Bactrim DS for PJP prophylaxis  - 9/13 UA negative  - f/u BAL cyto, cell count, cultures, PCP PCR, fungitell, aspergillus  - f/u blood and urine cultures, MRSA PCR, urine legionella. RVP negative      ENDOCRINE  # Hyperglycemia i/s/o steroids  Admission A1c 6.1  - blood glucose 200-300's  - hyperglycemia iso steroids   - can add NPH, increase to 5units q6 and mod ISS  - monitor closely and adjust as needed      HEME  # ECMO anticoagulation  - continue argatroban gtt goal aPTT 50-70  - INR elevated likely iso argatroban, however will give dose Vit. K  - Hgb stable 11.3  - Transfuse for Hgb <7, Plt<10    #R/O DVT  - LE duplex negative for DVT but will repeat  - f/u duplex    MSK  #Hx Sciatica  - c/o B/l hand and arm weakness and numbness ?2/2 to radiculopathy, unnclear etiology, likely myositis since pt has been having worsening weakness with movement of extremities vs Cellcept s/e. Symptoms improving subjectively.  - f/u with Dr. Nik Marie or Dante Urbano for EMG upon discharge (osh?)      RHEUM  - CT findings at OSH consistent with ILD   - CT findings, Improved/decreased extent of bilateral groundglass opacities and reticular markings compared to the previous study. Findings are nonspecific but differential etiologies include interstitial pneumonia, drug toxicity, nonspecific connective tissue disorders.  - OSH labs show strongly positive ARIANA, RNP, and anti smith antibodies with low C4 and normal C3. Patient with negative SSa and SSb, negative DsDNa,  - Rheum consult  - s/p 1g solumedrol - first dose on 9/13, plan for 1gm dose of solumedrol again today 9/14, and 1gm dose tomorrow 9/15  Ferritin lupus anticoagulant, DRRVT, Anti B2 glycoprotein and anticardiolipin IgG, IgM, C3, C4.   - Plan for Plex plasmapheresis      PROPHYLAXIS  # DVT: argatroban  # GI: TF      LINES  -Ecmo Cannulas  -[facility] TLC- 9/13 (placed at OSH)  -Left Axill Traci - 9/13 (placed at OSH)      GOC  -Palliative Following    DISPO: ICU  CODE STATUS: Full   61 yo F w/ Afib initially presented to urgent care for SOB where she had an XR done concerning for b/l lower infiltrates, sent to [facility] ED and  admitted to [facility] on 8/26 after being found to be hypoxemic, reported having  debilitating b/l hand cramps and some muscles weakness several months. Found to be hypoxic and have interstitial lung disease appearance on CT, admitted 8/26 for AHRF, further ILD workup and management,  started on prednisone on admission with gradually improving O2 requirement and has been stable on 2-3LNC since 9/3, underwent bronchoscopy with TBBX on 8/30 which showed Non-Specific Intersitial Disease (NSIP)/OP. Labs notable for positive ARIANA 1:1280, RNP > 8, Alejandro > 8. Patient was started on steroids and recieved cellcept, which was discontinued 2/2 tachycardia. Interval CTA chest on 9/11 also negative PE  but showed possible lingula pneumonia.  Started on empiric vancomycin and zosyn 9/12, course was then c/b episode of SVT to 170s w/ rapidly progressive hypoxemic respiratory failure, placed NRB offered HFNC/BiPAP but refused, leading to worsening hypoxemic respiratory failure requiring intubation  9/13. Despite best practices, patient remained hypoxemic and patient was cannulated for VV-ECMO on 9/13 and transferred to [facility].      NEUROLOGY  Home meds: gabapentin 100g TID  AA&Ox3 at baseline   - continue sedation with propfol and dilaudid i/s/o severe hypoxemic respiratory failure   - during sedation vacation yesterday, opened eyes, moved LE and R upper arm spontaneously but not following commands  - received cisatracurium IVP given for procedures yesterday  - low threshold to paralyze if dysynchronus   - CT 9/14 no acute findings  - Neurology following at OSH for pain and numbness of upper extremities x3 mos likely  neuropathy or myopathy r/t  underlying rheum condition, planned for EMG outpt w/Dr. Marie or Dr. Urbano  - Palliative following  - Social Work consult  - PT consult     PULMONARY  Acute hypoxemic respiratory failure 2/2 bacterial PNA vs atypical PNA vs aspiration vs AEILD. Intubated on 9/13  requiring VV ECMO. Patient had reported chronic SOB for several months, had a CT scan that was negative for PE. She also states that she was seen by a pulmonologist and rheumatology, where they ruled out lupus and other immunological disorders.  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   #Acute on Chronic Hypoxemic Respiratory Failure  # VV ECMO  - Current settings: PC  RR 12, PS 12, PEEP 12 Fi02 40% Ppeak: 24 pulling TV ~40-70  - Emergency settings: VC 28/400/12/100%  - Bronchoscopy showed mild thick yellow secretions in trachea, diffuse moderate thin green/brown secretions throughout, follow up BAL cultures  - airway clearance to assist facilitate secretion mobilization with IPV and duonebs  - f/u BAL cultures, urine strep and legionella  - c/w empiric abx       ECMO  VV ECMO		  Cannulation sites/dates: 9/13/23 R fem 25F. RIJ 19F   Flow: 3.75		P1: 176  	Delta P: 22  RPM: 2880		P2: 154		SVO2: 80  Sweep:   4     L/min:		FIO2:   100      ECMO Calculated CO:  4.9  DO2/VO2: .08  VO2/DO2: 12.037    - right groin cannula pulled-back ~3-4cm to position tip of cannula just inferior to hepatic vein.  - clinically perfusing. Lactate: downtrending 7.4 > 6.1  - ECMO sweep sighing q1hr  - Adjust circuit flow as tolerated with DO2:VO2 goal >2  - Monitor for hemolysis, chatter, evidence of recirculation,       CARDIOVASCULAR  #Tachyarrhythmia  #AF RVR  #Distributive Shock  Hx of Afib w at OSH, started on Amiodarone gtt now in shock state requiring low dose pressors most likely vasoplegic in the setting sedation, now with bradycardia   Shock state requiring pressors   - likely septic with component of vaspoplegia i/s/o sedation, possible contribution of cardiogenic from RV dysfunction  - c/w norepinephrine, will wean off Roosevelt today maintaining map >65 and continue Vasopressin for now while on NO2  - discontinue amio gtt iso bradycardia  - TTE 9/12 with EF 65-70% with normal LV and RV  - TTE 9/14 with  EF 18%. Severe global LV systolic dysfunction. RV enlargement with decreased RV systolic function, however RITCHIE and recent POCUS shows normal LV systolic function, normal RV systolic function with moderate enlargement  - POCUS:  Normal LVSF. RV systolic function improved from yesterday  - CT PE negative for PE    #Concern for Right Heart dysfunction  #Mild pHTN  - Likely i/s/o hypoxic vasoconstriction and high PEEP  - s/p milrinone gtt, d/c'd 9/13  - No PE seen on invasive pulmonary angiography at time of ECMO cannulation  - NO2 lowered to 10ppm, will continue to wean while watching RV function       RENAL  sCr baseline 0.78  - Creatinine wnl, 0.77 today   - monitor i/o's, positive 1L LOS/ positive 1.6 L 24hrs  - s/p lasix, goal to keep 1-1.5L net negative   - indwelling catheter in place, making good UO  - monitor, Replete to K>4, Phos>3, Mg>2, iCa>1      GASTROINTESTINAL  # Nutrition  #Transaminase elevation  - LFT's elevated, now slightly downtrending, however T.bili is high and uptrending, likely 2/2 combination of shock liver and malposition of ECMO cannula, if not improving can consider pulling back ECMO cannula  - RUQ US 9/15 shows fatty infiltration of liver, with no cholelithiasis or biliary ductal dilatation.  - continue tube feeds via NGT  - continue PPI iso steroids   - continue bowel regimen senna and miralax and naloxogel to avoid opioid induced constipation  - nutrition following    INFECTIOUS DISEASE  No leukocytosis, afebrile  - c/w empiric donald (9/13- ) for 7 day course  - can d/c vancomycin (9/14-9/15 ) negative mrsa pcr and azithro (9/14-9/15) neg U legionella  - c/w Bactrim DS for PJP prophylaxis  - 9/13 UA negative  - f/u BAL cyto, cell count, cultures, PCP PCR, fungitell, aspergillus  - f/u blood and urine cultures, MRSA PCR, urine legionella. RVP negative      ENDOCRINE  # Hyperglycemia i/s/o steroids  Admission A1c 6.1  - hyperglycemia iso steroids   - blood glucose remained elevated despite increasing -399  - start insulin gtt for tighter glycemic contril  - monitor closely and adjust as needed      HEME  # ECMO anticoagulation  - continue argatroban gtt goal aPTT 50-70  - INR elevated likely iso argatroban and liver dysfxn, s/p Vit. K and FFP x1  - Hgb stable 11.9  - Transfuse for Hgb <7, Plt<10    #R/O DVT  - LE duplex negative for DVT but will repeat  - f/u duplex    MSK  #Hx Sciatica  - c/o B/l hand and arm weakness and numbness ?2/2 to radiculopathy, unnclear etiology, likely myositis since pt has been having worsening weakness with movement of extremities vs Cellcept s/e. Symptoms improving subjectively.  - f/u with Dr. Nik Marie or Dante Urbano for EMG upon discharge (osh?)      RHEUM  - CT findings at OSH consistent with ILD   - CT findings, Improved/decreased extent of bilateral groundglass opacities and reticular markings compared to the previous study. Findings are nonspecific but differential etiologies include interstitial pneumonia, drug toxicity, nonspecific connective tissue disorders.  - OSH labs show strongly positive ARIANA, RNP, and anti smith antibodies with low C4 and normal C3. Patient with negative SSa and SSb, negative DsDNa,  - Rheum consult  - s/p 1g solumedrol - first dose on 9/13, plan for 1gm dose of solumedrol again today 9/14, and 1gm dose tomorrow 9/15  Ferritin lupus anticoagulant, DRRVT, Anti B2 glycoprotein and anticardiolipin IgG, IgM, C3, C4.   - Plan for Plex plasmapheresis      PROPHYLAXIS  # DVT: argatroban  # GI: TF      LINES  -Ecmo Cannulas  -[facility] TLC- 9/13 (placed at OSH)  -Left Axill Hall - 9/13 (placed at OSH)      GOC  -Palliative Following    DISPO: ICU  CODE STATUS: Full   63 yo F w/ Afib initially presented to urgent care for SOB where she had an XR done concerning for b/l lower infiltrates, sent to [facility] ED and  admitted to [facility] on 8/26 after being found to be hypoxemic, reported having  debilitating b/l hand cramps and some muscles weakness several months. Found to be hypoxic and have interstitial lung disease appearance on CT, admitted 8/26 for AHRF, further ILD workup and management,  started on prednisone on admission with gradually improving O2 requirement and has been stable on 2-3LNC since 9/3, underwent bronchoscopy with TBBX on 8/30 which showed Non-Specific Intersitial Disease (NSIP)/OP. Labs notable for positive ARIANA 1:1280, RNP > 8, Alejandro > 8. Patient was started on steroids and recieved cellcept, which was discontinued 2/2 tachycardia. Interval CTA chest on 9/11 also negative PE  but showed possible lingula pneumonia.  Started on empiric vancomycin and zosyn 9/12, course was then c/b episode of SVT to 170s w/ rapidly progressive hypoxemic respiratory failure, placed NRB offered HFNC/BiPAP but refused, leading to worsening hypoxemic respiratory failure requiring intubation  9/13. Despite best practices, patient remained hypoxemic and patient was cannulated for VV-ECMO on 9/13 and transferred to [facility].      NEUROLOGY  Home meds: gabapentin 100g TID  AA&Ox3 at baseline   - continue sedation with propfol and dilaudid i/s/o severe hypoxemic respiratory failure  - during sedation vacation yesterday, opened eyes, moved LE and R upper arm spontaneously but not following commands  - received cisatracurium IVP given for procedures yesterday  - low threshold to paralyze if dysynchronus   - CT 9/14 no acute findings  - Neurology following at OSH for pain and numbness of upper extremities x3 mos likely  neuropathy or myopathy r/t  underlying rheum condition, planned for EMG outpt w/Dr. Marie or Dr. Urbano  - Palliative following  - Social Work consult  - PT consult     PULMONARY  Acute hypoxemic respiratory failure 2/2 bacterial PNA vs atypical PNA vs aspiration vs AEILD. Intubated on 9/13  requiring VV ECMO. Patient had reported chronic SOB for several months, had a CT scan that was negative for PE. She also states that she was seen by a pulmonologist and rheumatology, where they ruled out lupus and other immunological disorders.  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   #Acute on Chronic Hypoxemic Respiratory Failure  # VV ECMO  - Current settings: PC  RR 12, PS 12, PEEP 12 Fi02 40% Ppeak: 24 pulling TV ~40-70  - Emergency settings: VC 28/400/12/100%  - Bronchoscopy showed mild thick yellow secretions in trachea, diffuse moderate thin green/brown secretions throughout, follow up BAL cultures  - airway clearance to assist facilitate secretion mobilization with IPV and duonebs  - f/u BAL cultures, urine strep and legionella  - c/w empiric abx       ECMO  VV ECMO		  Cannulation sites/dates: 9/13/23 R fem 25F. RIJ 19F   Flow: 3.75		P1: 176  	Delta P: 22  RPM: 2880		P2: 154		SVO2: 80  Sweep:   4     L/min:		FIO2:   100      ECMO Calculated CO:  4.9  DO2/VO2: .08  VO2/DO2: 12.037    - right groin cannula pulled-back ~3-4cm to position tip of cannula just inferior to hepatic vein.  - clinically perfusing. Lactate: downtrending 7.4 > 6.1  - ECMO sweep sighing q1hr  - Adjust circuit flow as tolerated with DO2:VO2 goal >2  - Monitor for hemolysis, chatter, evidence of recirculation,       CARDIOVASCULAR  #Tachyarrhythmia  #AF RVR  #Distributive Shock  Hx of Afib w at OSH, started on Amiodarone gtt now in shock state requiring low dose pressors most likely vasoplegic in the setting sedation, now with bradycardia   Shock state requiring pressors   - likely septic with component of vaspoplegia i/s/o sedation, possible contribution of cardiogenic from RV dysfunction  - c/w norepinephrine, will wean off Roosevelt today maintaining map >65 and continue Vasopressin for now while on NO2  - discontinue amio gtt iso bradycardia  - TTE 9/12 with EF 65-70% with normal LV and RV  - TTE 9/14 with  EF 18%. Severe global LV systolic dysfunction. RV enlargement with decreased RV systolic function, however RITCHIE and recent POCUS shows normal LV systolic function, normal RV systolic function with moderate enlargement  - POCUS:  Normal LVSF. RV systolic function improved from yesterday  - CT PE negative for PE    #Concern for Right Heart dysfunction  #Mild pHTN  - Likely i/s/o hypoxic vasoconstriction and high PEEP  - s/p milrinone gtt, d/c'd 9/13  - No PE seen on invasive pulmonary angiography at time of ECMO cannulation  - NO2 lowered to 10ppm, will continue to wean while watching RV function       RENAL  sCr baseline 0.78  - Creatinine wnl, 0.77 today   - monitor i/o's, positive 1L LOS/ positive 1.6 L 24hrs  - s/p lasix, goal to keep 1-1.5L net negative   - indwelling catheter in place, making good UO  - monitor, Replete to K>4, Phos>3, Mg>2, iCa>1      GASTROINTESTINAL  # Nutrition  #Transaminase elevation  - LFT's elevated, now slightly downtrending, however T.bili is high and uptrending, likely 2/2 combination of shock liver and malposition of ECMO cannula, if not improving can consider pulling back ECMO cannula  - RUQ US 9/15 shows fatty infiltration of liver, with no cholelithiasis or biliary ductal dilatation.  - continue tube feeds via NGT  - continue PPI iso steroids   - continue bowel regimen senna and miralax and naloxogel to avoid opioid induced constipation  - nutrition following    INFECTIOUS DISEASE  No leukocytosis, afebrile  - c/w empiric donald (9/13- ) for 7 day course  - can d/c vancomycin (9/14-9/15 ) negative mrsa pcr and azithro (9/14-9/15) neg U legionella  - c/w Bactrim DS for PJP prophylaxis  - 9/13 UA negative  - f/u BAL cyto, cell count, cultures, PCP PCR, fungitell, aspergillus  - f/u blood and urine cultures, MRSA PCR, urine legionella. RVP negative      ENDOCRINE  # Hyperglycemia i/s/o steroids  Admission A1c 6.1  - hyperglycemia iso steroids   - blood glucose remained elevated despite increasing -399  - start insulin gtt for tighter glycemic contril  - monitor closely and adjust as needed      HEME  # ECMO anticoagulation  - continue argatroban gtt goal aPTT 50-70  - INR elevated likely iso argatroban and liver dysfxn, s/p Vit. K and FFP x1  - Hgb stable 11.9  - Transfuse for Hgb <7, Plt<10    #R/O DVT  - LE duplex negative for DVT but will repeat  - f/u duplex    MSK  #Hx Sciatica  - c/o B/l hand and arm weakness and numbness ?2/2 to radiculopathy, unnclear etiology, likely myositis since pt has been having worsening weakness with movement of extremities vs Cellcept s/e. Symptoms improving subjectively.  - f/u with Dr. Nik Marie or Dante Urbano for EMG upon discharge (osh?)      RHEUM  - CT findings at OSH consistent with ILD   - CT findings, Improved/decreased extent of bilateral groundglass opacities and reticular markings compared to the previous study. Findings are nonspecific but differential etiologies include interstitial pneumonia, drug toxicity, nonspecific connective tissue disorders.  - OSH labs show strongly positive ARIANA, RNP, and anti smith antibodies with low C4 and normal C3. Patient with negative SSa and SSb, negative DsDNa,  - Rheum consult  - s/p 1g solumedrol - first dose on 9/13, plan for 1gm dose of solumedrol again today 9/14, and 1gm dose tomorrow 9/15  Ferritin lupus anticoagulant, DRRVT, Anti B2 glycoprotein and anticardiolipin IgG, IgM, C3, C4.   - Plan for Plex plasmapheresis      PROPHYLAXIS  # DVT: argatroban  # GI: TF      LINES  -Ecmo Cannulas  -LIJ TLC- 9/13 (placed at OSH)  -Left Axill Traci - 9/13 (placed at OSH)      GOC  -Palliative Following    DISPO: ICU  CODE STATUS: Full   63 yo F w/ Afib initially presented to urgent care for SOB where she had an XR done concerning for b/l lower infiltrates, sent to [facility] ED and  admitted to [facility] on 8/26 after being found to be hypoxemic, reported having  debilitating b/l hand cramps and some muscles weakness several months. Found to be hypoxic and have interstitial lung disease appearance on CT, admitted 8/26 for AHRF, further ILD workup and management,  started on prednisone on admission with gradually improving O2 requirement and has been stable on 2-3LNC since 9/3, underwent bronchoscopy with TBBX on 8/30 which showed Non-Specific Intersitial Disease (NSIP)/OP. Labs notable for positive ARIANA 1:1280, RNP > 8, Alejandro > 8. Patient was started on steroids and recieved cellcept, which was discontinued 2/2 tachycardia. Interval CTA chest on 9/11 also negative PE  but showed possible lingula pneumonia.  Started on empiric vancomycin and zosyn 9/12, course was then c/b episode of SVT to 170s w/ rapidly progressive hypoxemic respiratory failure, placed NRB offered HFNC/BiPAP but refused, leading to worsening hypoxemic respiratory failure requiring intubation  9/13. Despite best practices, patient remained hypoxemic and patient was cannulated for VV-ECMO on 9/13 and transferred to [facility].      NEUROLOGY  Home meds: gabapentin 100g TID  AA&Ox3 at baseline   - continue sedation with propfol and dilaudid i/s/o severe hypoxemic respiratory failure  - during sedation vacation yesterday, opened eyes, moved LE and R upper arm spontaneously but not following commands  - received cisatracurium IVP given for procedures yesterday  - low threshold to paralyze if dysynchronus   - CT 9/14 no acute findings  - Neurology following at OSH for pain and numbness of upper extremities x3 mos likely  neuropathy or myopathy r/t  underlying rheum condition, planned for EMG outpt w/Dr. Marie or Dr. Urbano  - Palliative following  - Social Work consult  - PT consult     PULMONARY  Acute hypoxemic respiratory failure 2/2 bacterial PNA vs atypical PNA vs aspiration vs AEILD. Intubated on 9/13  requiring VV ECMO. Patient had reported chronic SOB for several months, had a CT scan that was negative for PE. She also states that she was seen by a pulmonologist and rheumatology, where they ruled out lupus and other immunological disorders.  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   #Acute on Chronic Hypoxemic Respiratory Failure  # VV ECMO  - Current settings: PC  RR 12, PS 12, PEEP 12 Fi02 40% Ppeak: 24 pulling TV ~40-70  - Emergency settings: VC 28/400/12/100%  - Bronchoscopy showed mild thick yellow secretions in trachea, diffuse moderate thin green/brown secretions throughout, follow up BAL cultures  - airway clearance to assist facilitate secretion mobilization with IPV and duonebs  - f/u BAL cultures, urine strep and legionella  - c/w empiric abx       ECMO  VV ECMO		  Cannulation sites/dates: 9/13/23 R fem 25F. RIJ 19F   Flow: 3.75		P1: 176  	Delta P: 22  RPM: 2880		P2: 154		SVO2: 80  Sweep:   4     L/min:		FIO2:   100      ECMO Calculated CO:  4.9  DO2/VO2: .08  VO2/DO2: 12.037    - right groin cannula pulled-back ~3-4cm to position tip of cannula just inferior to hepatic vein.  - clinically perfusing. Lactate: downtrending 7.4 > 6.1  - ECMO sweep sighing q1hr  - Adjust circuit flow as tolerated with DO2:VO2 goal >2  - Monitor for hemolysis, chatter, evidence of recirculation,       CARDIOVASCULAR  #Tachyarrhythmia  #AF RVR  #Distributive Shock  Hx of Afib w at OSH, started on Amiodarone gtt now in shock state requiring low dose pressors most likely vasoplegic in the setting sedation, now with bradycardia   Shock state requiring pressors   - likely septic with component of vaspoplegia i/s/o sedation, possible contribution of cardiogenic from RV dysfunction  - c/w norepinephrine, will wean off Roosevelt today maintaining map >65 and continue Vasopressin for now while on NO2  - discontinue amio gtt iso bradycardia  - TTE 9/12 with EF 65-70% with normal LV and RV  - TTE 9/14 with  EF 18%. Severe global LV systolic dysfunction. RV enlargement with decreased RV systolic function, however RITCHIE and recent POCUS shows normal LV systolic function, normal RV systolic function with moderate enlargement  - POCUS:  Normal LVSF. RV systolic function improved from yesterday  - CT PE negative for PE    #Concern for Right Heart dysfunction  #Mild pHTN  - Likely i/s/o hypoxic vasoconstriction and high PEEP  - s/p milrinone gtt, d/c'd 9/13  - No PE seen on invasive pulmonary angiography at time of ECMO cannulation  - NO2 lowered to 10ppm, will continue to wean while watching RV function       RENAL  sCr baseline 0.78  - Creatinine wnl, 0 today   - monitor i/o's, positive 1L LOS/ positive 1.6 L 24hrs  - s/p lasix, goal to keep 1-1.5L net negative   - indwelling catheter in place, making good UO  - monitor, Replete to K>4, Phos>3, Mg>2, iCa>1      GASTROINTESTINAL  # Nutrition  #Transaminase elevation  - LFT's elevated, now slightly downtrending, however T.bili is high and uptrending, likely 2/2 combination of shock liver and malposition of ECMO cannula, if not improving can consider pulling back ECMO cannula  - RUQ US 9/15 shows fatty infiltration of liver, with no cholelithiasis or biliary ductal dilatation.  - continue tube feeds via NGT  - continue PPI iso steroids   - continue bowel regimen senna and miralax and naloxogel to avoid opioid induced constipation  - nutrition following    INFECTIOUS DISEASE  No leukocytosis, afebrile  - c/w empiric donald (9/13- ) for 7 day course  - can d/c vancomycin (9/14-9/15 ) negative mrsa pcr and azithro (9/14-9/15) neg U legionella  - c/w Bactrim DS for PJP prophylaxis  - 9/13 UA negative  - f/u BAL cyto, cell count, cultures, PCP PCR, fungitell, aspergillus  - f/u blood and urine cultures, MRSA PCR, urine legionella. RVP negative      ENDOCRINE  # Hyperglycemia i/s/o steroids  Admission A1c 6.1  - hyperglycemia iso steroids   - blood glucose remained elevated despite increasing -399  - start insulin gtt for tighter glycemic contril  - monitor closely and adjust as needed      HEME  # ECMO anticoagulation  - continue argatroban gtt goal aPTT 50-70  - INR elevated likely iso argatroban and liver dysfxn, s/p Vit. K and FFP x1  - Hgb stable 11.9  - Transfuse for Hgb <7, Plt<10    #R/O DVT  - LE duplex negative for DVT but will repeat  - f/u duplex    MSK  #Hx Sciatica  - c/o B/l hand and arm weakness and numbness ?2/2 to radiculopathy, unnclear etiology, likely myositis since pt has been having worsening weakness with movement of extremities vs Cellcept s/e. Symptoms improving subjectively.  - f/u with Dr. Nik Marie or Dante Urbano for EMG upon discharge (osh?)      RHEUM  - CT findings at OSH consistent with ILD   - CT findings, Improved/decreased extent of bilateral groundglass opacities and reticular markings compared to the previous study. Findings are nonspecific but differential etiologies include interstitial pneumonia, drug toxicity, nonspecific connective tissue disorders.  - OSH labs show strongly positive ARIANA, RNP, and anti smith antibodies with low C4 and normal C3. Patient with negative SSa and SSb, negative DsDNa,  - Rheum consult  - s/p 1g solumedrol - first dose on 9/13, plan for 1gm dose of solumedrol again today 9/14, and 1gm dose tomorrow 9/15  Ferritin lupus anticoagulant, DRRVT, Anti B2 glycoprotein and anticardiolipin IgG, IgM, C3, C4.   - Plan for Plex plasmapheresis      PROPHYLAXIS  # DVT: argatroban  # GI: TF      LINES  -Ecmo Cannulas  -[facility] TLC- 9/13 (placed at OSH)  -Left Axill Hitchins - 9/13 (placed at OSH)      GOC  -Palliative Following    DISPO: ICU  CODE STATUS: Full   61 yo F w/ Afib initially presented to urgent care for SOB where she had an XR done concerning for b/l lower infiltrates, sent to [facility] ED and  admitted to [facility] on 8/26 after being found to be hypoxemic, reported having  debilitating b/l hand cramps and some muscles weakness several months. Found to be hypoxic and have interstitial lung disease appearance on CT, admitted 8/26 for AHRF, further ILD workup and management,  started on prednisone on admission with gradually improving O2 requirement and has been stable on 2-3LNC since 9/3, underwent bronchoscopy with TBBX on 8/30 which showed Non-Specific Intersitial Disease (NSIP)/OP. Labs notable for positive ARIANA 1:1280, RNP > 8, Alejandro > 8. Patient was started on steroids and recieved cellcept, which was discontinued 2/2 tachycardia. Interval CTA chest on 9/11 also negative PE  but showed possible lingula pneumonia.  Started on empiric vancomycin and zosyn 9/12, course was then c/b episode of SVT to 170s w/ rapidly progressive hypoxemic respiratory failure, placed NRB offered HFNC/BiPAP but refused, leading to worsening hypoxemic respiratory failure requiring intubation  9/13. Despite best practices, patient remained hypoxemic and patient was cannulated for VV-ECMO on 9/13 and transferred to [facility].      NEUROLOGY  Home meds: gabapentin 100g TID  AA&Ox3 at baseline   - continue sedation with propfol and dilaudid i/s/o severe hypoxemic respiratory failure  - during sedation vacation yesterday, opened eyes, moved LE and R upper arm spontaneously but not following commands  - received cisatracurium IVP given for procedures yesterday  - low threshold to paralyze if dysynchronus   - CT 9/14 no acute findings  - Neurology following at OSH for pain and numbness of upper extremities x3 mos likely  neuropathy or myopathy r/t  underlying rheum condition, planned for EMG outpt w/Dr. Marie or Dr. Urbano  - Palliative following  - Social Work consult  - PT consult     PULMONARY  Acute hypoxemic respiratory failure 2/2 bacterial PNA vs atypical PNA vs aspiration vs AEILD. Intubated on 9/13  requiring VV ECMO. Patient had reported chronic SOB for several months, had a CT scan that was negative for PE. She also states that she was seen by a pulmonologist and rheumatology, where they ruled out lupus and other immunological disorders.  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   #Acute on Chronic Hypoxemic Respiratory Failure  # VV ECMO  - Current settings: PC  RR 12, PS 12, PEEP 12 Fi02 40% Ppeak: 24 pulling TV ~40-70  - Emergency settings: VC 28/400/12/100%  - Bronchoscopy showed mild thick yellow secretions in trachea, diffuse moderate thin green/brown secretions throughout, follow up BAL cultures  - airway clearance to assist facilitate secretion mobilization with IPV and duonebs  - f/u BAL cultures, urine strep and legionella  - c/w empiric abx       ECMO  VV ECMO		  Cannulation sites/dates: 9/13/23 R fem 25F. RIJ 19F   Flow: 3.75		P1: 176  	Delta P: 22  RPM: 2880		P2: 154		SVO2: 80  Sweep:   4     L/min:		FIO2:   100      ECMO Calculated CO:  4.9  DO2/VO2: .08  VO2/DO2: 12.037    - right groin cannula pulled-back ~3-4cm to position tip of cannula just inferior to hepatic vein.  - clinically perfusing. Lactate: downtrending 7.4 > 6.1  - ECMO sweep sighing q1hr  - Adjust circuit flow as tolerated with DO2:VO2 goal >2  - Monitor for hemolysis, chatter, evidence of recirculation,       CARDIOVASCULAR  #Tachyarrhythmia  #AF RVR  #Distributive Shock  Hx of Afib w at OSH, started on Amiodarone gtt now in shock state requiring low dose pressors most likely vasoplegic in the setting sedation, now with bradycardia   Shock state requiring pressors   - likely septic with component of vaspoplegia i/s/o sedation, possible contribution of cardiogenic from RV dysfunction  - c/w norepinephrine, will wean off Roosevelt today maintaining map >65 and continue Vasopressin for now while on NO2  - discontinue amio gtt iso bradycardia  - TTE 9/12 with EF 65-70% with normal LV and RV  - TTE 9/14 with  EF 18%. Severe global LV systolic dysfunction. RV enlargement with decreased RV systolic function, however RITCHIE and recent POCUS shows normal LV systolic function, normal RV systolic function with moderate enlargement. VTi 23.4  - POCUS:  Normal LVSF. RV systolic function improved from yesterday  - CT negative for PE    #Concern for Right Heart dysfunction  #Mild pHTN  - Likely i/s/o hypoxic vasoconstriction and high PEEP  - s/p milrinone gtt, d/c'd 9/13  - No PE seen on invasive pulmonary angiography at time of ECMO cannulation or in recent imaging  - NO2 lowered to 10ppm, will continue to wean while watching RV function       RENAL  sCr baseline 0.78  - Creatinine wnl, 0 today   - monitor i/o's, positive 1L LOS/ positive 1.6 L 24hrs  - s/p lasix, goal to keep 1-1.5L net negative, plan for hemoconcentrator   - indwelling catheter in place, making good UO  - monitor, Replete to K>4, Phos>3, Mg>2, iCa>1      GASTROINTESTINAL  # Nutrition  #Transaminase elevation  - LFT's elevated, now slightly downtrending, however T.bili is high and uptrending, likely 2/2 combination of shock liver and malposition of ECMO cannula, if not improving can consider pulling back ECMO cannula  - Triglycerides elevated 836, currently on insulin gtt for hyperglycemia, will continue to monitor and consider using ketamine/precedex for sedation in place of propofol if continues to rise  - RUQ US 9/15 shows fatty infiltration of liver, with no cholelithiasis or biliary ductal dilatation.  - continue tube feeds via NGT  - continue PPI iso steroids   - continue bowel regimen senna and miralax and naloxogel to avoid opioid induced constipation  - nutrition following    INFECTIOUS DISEASE  No leukocytosis, afebrile  - c/w empiric donald (9/13- ) for 7 day course  - can d/c vancomycin (9/14-9/15 ) negative mrsa pcr and azithro (9/14-9/15) neg U legionella  - c/w Bactrim DS for PJP prophylaxis  - 9/13 Ucx and blood cx ngtd, RVP negative  - f/u BAL cyto, cell count, cultures, PCP PCR, fungitell, aspergillus  - f/u blood and urine cultures       ENDOCRINE  # Hyperglycemia i/s/o steroids  Admission A1c 6.1  - hyperglycemia iso steroids   - blood glucose remained elevated despite increasing -399  - start insulin gtt for tighter glycemic control  - monitor closely and adjust as needed      HEME  # ECMO anticoagulation  - continue argatroban gtt goal aPTT 50-70  - INR elevated likely iso argatroban and liver dysfxn, s/p Vit. K and FFP x1  - fibrinogen 237  - Hgb stable 11.9  - Transfuse for Hgb <7, Plt<10    #R/O DVT  - LE duplex negative for DVT but will repeat  - f/u duplex    MSK  #Hx Sciatica  - c/o B/l hand and arm weakness and numbness ?2/2 to radiculopathy, unnclear etiology, likely myositis since pt has been having worsening weakness with movement of extremities vs Cellcept s/e. Symptoms improving subjectively.  - f/u with Dr. Nik Marie or Dante Urbano for EMG upon discharge (osh?)      RHEUM  - CT findings at OSH consistent with ILD   - CT findings, Improved/decreased extent of bilateral groundglass opacities and reticular markings compared to the previous study. Findings are nonspecific but differential etiologies include interstitial pneumonia, drug toxicity, nonspecific connective tissue disorders.  - OSH labs show strongly positive ARIANA, RNP, and anti smith antibodies with low C4 and normal C3. Patient with negative SSa and SSb, negative DsDNa,  - Rheum consult  - s/p 1g solumedrol - first dose on 9/13, plan for 1gm dose of solumedrol again today 9/14, and 1gm dose tomorrow 9/15  Ferritin lupus anticoagulant, DRRVT, Anti B2 glycoprotein and anticardiolipin IgG, IgM, C3, C4.   - Plan for Plex plasmapheresis      PROPHYLAXIS  # DVT: argatroban  # GI: TF      LINES  -Ecmo Cannulas  -[facility]J TLC- 9/13 (placed at OSH)  -Left Axill Herald - 9/13 (placed at OSH)      GOC  -Palliative Following    DISPO: ICU  CODE STATUS: Full   61 yo F w/ Afib initially presented to urgent care for SOB where she had an XR done concerning for b/l lower infiltrates, sent to [facility] ED and  admitted to [facility] on 8/26 after being found to be hypoxemic, reported having  debilitating b/l hand numbness/tingling and some muscles weakness several months. Found to be hypoxic and have interstitial lung disease appearance on CT, admitted 8/26 for AHRF, further ILD workup and management,  started on prednisone on admission with gradually improving O2 requirement and has been stable on 2-3LNC since 9/3, underwent bronchoscopy with TBBX on 8/30 which showed Non-Specific Intersitial Disease (NSIP)/OP. Labs notable for positive ARIANA 1:1280, RNP > 8, Alejandro > 8. Patient continued on steroids and recieved cellcept, which was discontinued 2/2 Afib-RVR. Interval CTA chest on 9/11 also negative PE did show possible lingula pneumonia.  Started on empiric vancomycin and zosyn 9/12, course was then c/b episode of SVT to 170s w/ rapidly progressive hypoxemic respiratory failure, placed NRB offered HFNC/BiPAP but refused, leading to worsening hypoxemic respiratory failure requiring intubation  9/13. Despite best practices, patient remained hypoxemic and patient was cannulated for VV-ECMO on 9/13 and transferred to [facility].      NEUROLOGY  Home meds: gabapentin 100g TID  AA&Ox3 at baseline   - continue sedation with propfol and dilaudid i/s/o severe hypoxemic respiratory failure  - during sedation vacation yesterday, opened eyes, moved LE and R upper arm spontaneously but not following commands  - received cisatracurium IVP given for procedures yesterday  - low threshold to paralyze if dysynchronus   - [facility] 9/14 no acute findings  - Neurology following at OSH for pain and numbness of upper extremities x3 mos likely  neuropathy or myopathy r/t  underlying rheum condition, planned for EMG outpt w/Dr. Marie or Dr. Urbano  - Palliative following  - Social Work consult  - PT consult     PULMONARY  Acute hypoxemic respiratory failure 2/2 bacterial PNA vs atypical PNA vs aspiration vs AEILD. Intubated on 9/13  requiring VV ECMO. Patient had reported chronic SOB for several months, had a CT scan that was negative for PE. She also states that she was seen by a pulmonologist and rheumatology, where they ruled out lupus and other immunological disorders.  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   #Acute on Chronic Hypoxemic Respiratory Failure  # VV ECMO  - Current settings: PC  RR 12, PS 12, PEEP 12 Fi02 40% Ppeak: 24 pulling TV ~40-70  - Emergency settings: VC 28/400/12/100%  - Bronchoscopy showed mild thick yellow secretions in trachea, diffuse moderate thin green/brown secretions throughout, follow up BAL cultures  - airway clearance to assist facilitate secretion mobilization with IPV and duonebs  - f/u BAL cultures, urine strep and legionella  - c/w empiric abx       ECMO  VV ECMO		  Cannulation sites/dates: 9/13/23 R fem 25F. RIJ 19F   Flow: 3.75		P1: 176  	Delta P: 22  RPM: 2880		P2: 154		SVO2: 80  Sweep:   4     L/min:		FIO2:   100      ECMO Calculated CO:  4.9  DO2/VO2: .08  VO2/DO2: 12.037    - right groin cannula pulled-back ~3-4cm to position tip of cannula just inferior to hepatic vein.  - clinically perfusing. Lactate: downtrending 7.4 > 6.1  - ECMO sweep sighing q1hr  - Adjust circuit flow as tolerated with DO2:VO2 goal >2  - Monitor for hemolysis, chatter, evidence of recirculation,       CARDIOVASCULAR  #Tachyarrhythmia  #AF RVR  #Distributive Shock  Hx of Afib w at OSH, started on Amiodarone gtt now in shock state requiring low dose pressors most likely vasoplegic in the setting sedation, now with bradycardia   Shock state requiring pressors   - likely septic with component of vaspoplegia i/s/o sedation, possible contribution of cardiogenic from RV dysfunction  - c/w norepinephrine, will wean off Roosevelt today maintaining map >65 and continue Vasopressin for now while on NO2  - discontinue amio gtt iso bradycardia  - TTE 9/12 with EF 65-70% with normal LV and RV  - TTE 9/14 with  EF 18%. Severe global LV systolic dysfunction. RV enlargement with decreased RV systolic function, however RITCHIE and recent POCUS shows normal LV systolic function, normal RV systolic function with moderate enlargement. VTi 23.4  - POCUS:  Normal LVSF. RV systolic function improved from yesterday  - CT negative for PE    #Concern for Right Heart dysfunction  #Mild pHTN  - Likely i/s/o hypoxic vasoconstriction and high PEEP  - s/p milrinone gtt, d/c'd 9/13  - No PE seen on invasive pulmonary angiography at time of ECMO cannulation or in recent imaging  - NO2 lowered to 10ppm, will continue to wean while watching RV function       RENAL  sCr baseline 0.78  - Creatinine wnl, 0 today   - monitor i/o's, positive 1L LOS/ positive 1.6 L 24hrs  - s/p lasix, goal to keep 1-1.5L net negative, plan for hemoconcentrator   - indwelling catheter in place, making good UO  - monitor, Replete to K>4, Phos>3, Mg>2, iCa>1      GASTROINTESTINAL  # Nutrition  #Transaminase elevation  - LFT's elevated, now slightly downtrending, however T.bili is high and uptrending, likely 2/2 combination of shock liver and malposition of ECMO cannula, if not improving can consider pulling back ECMO cannula  - Triglycerides elevated 836, currently on insulin gtt for hyperglycemia, will continue to monitor and consider using ketamine/precedex for sedation in place of propofol if continues to rise  - RUQ US 9/15 shows fatty infiltration of liver, with no cholelithiasis or biliary ductal dilatation.  - continue tube feeds via NGT  - continue PPI iso steroids   - continue bowel regimen senna and miralax and naloxogel to avoid opioid induced constipation  - nutrition following    INFECTIOUS DISEASE  No leukocytosis, afebrile  - s/p zosyn at OSH (9/12) for lingula PNA  - c/w empiric donald (9/13- ) for 7 day course  - can d/c vancomycin (9/12-9/15 ) negative mrsa PCR, and azithro (9/14-9/15) neg Urine legionella  - c/w Bactrim DS for PJP prophylaxis  - 9/13 Ucx and blood cx ngtd, RVP negative  - f/u BAL cyto, cell count, cultures, PCP PCR, fungitell, aspergillus  - f/u blood and urine cultures       ENDOCRINE  # Hyperglycemia i/s/o steroids  Admission A1c 6.1  - hyperglycemia iso steroids   - blood glucose remained elevated despite increasing -399  - start insulin gtt for tighter glycemic control, and now found to have elevated triglycerides  - monitor closely and adjust as needed      HEME  # ECMO anticoagulation  - continue argatroban gtt goal aPTT 50-70  - INR elevated likely iso argatroban and liver dysfxn, s/p Vit. K and FFP x1  - fibrinogen 237  - Hgb stable 11.9  - Transfuse for Hgb <7, Plt<10    #R/O DVT  - LE duplex negative for DVT but will repeat  - f/u duplex    MSK  #Hx Sciatica  - c/o B/l hand and arm weakness and numbness ?2/2 to radiculopathy, unnclear etiology, likely myositis since pt has been having worsening weakness with movement of extremities vs Cellcept s/e. Symptoms improving subjectively.  - f/u with Dr. Nik Marie or Dante Urbano for EMG upon discharge (osh?)      RHEUM  - CT findings at OSH consistent with ILD   - CT findings, Improved/decreased extent of bilateral groundglass opacities and reticular markings compared to the previous study. Findings are nonspecific but differential etiologies include interstitial pneumonia, drug toxicity, nonspecific connective tissue disorders.  - OSH labs show strongly positive ARIANA, RNP, and anti smith antibodies with low C4 and normal C3. 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 First session today  (9/15), next session planned for Sunday 0730am (will need to coordinate with perfusion)  - plan to start Cyclophosphamide, but due to the patient's severe hepatitis and elevated LFT's tomorrow, we may consider using Rituximab as an alternative immunosuppressive      PROPHYLAXIS  # DVT: argatroban  # GI: TF      LINES  -Ecmo Cannulas  -[facility] TLC- 9/13 (placed at OSH)  -Left Axill Dorado - 9/13 (placed at OSH)      GOC  -Palliative Following    DISPO: ICU  CODE STATUS: Full   61 yo F w/ Afib initially presented to urgent care for SOB where she had an XR done concerning for b/l lower infiltrates, sent to [facility] ED and  admitted to [facility] on 8/26 after being found to be hypoxemic, reported having  debilitating b/l hand numbness/tingling and some muscles weakness several months. Found to be hypoxic and have interstitial lung disease appearance on CT, admitted 8/26 for AHRF, further ILD workup and management,  started on prednisone on admission with gradually improving O2 requirement and has been stable on 2-3LNC since 9/3, underwent bronchoscopy with TBBX on 8/30 which showed Non-Specific Intersitial Disease (NSIP)/OP. Labs notable for positive ARIANA 1:1280, RNP > 8, Alejandro > 8. Patient continued on steroids and recieved cellcept, which was discontinued 2/2 Afib-RVR. Interval CTA chest on 9/11 also negative PE did show possible lingula pneumonia.  Started on empiric vancomycin and zosyn 9/12, course was then c/b episode of SVT to 170s w/ rapidly progressive hypoxemic respiratory failure, placed NRB offered HFNC/BiPAP but refused, leading to worsening hypoxemic respiratory failure requiring intubation  9/13. Despite best practices, patient remained hypoxemic and patient was cannulated for VV-ECMO on 9/13 and transferred to [facility].      NEUROLOGY  Home meds: gabapentin 100g TID  AA&Ox3 at baseline   - continue sedation with propfol and dilaudid i/s/o severe hypoxemic respiratory failure  - during sedation vacation yesterday, opened eyes, moved LE and R upper arm spontaneously but not following commands  - received cisatracurium IVP given for procedures yesterday  - low threshold to paralyze if dysynchronus   - [facility] 9/14 no acute findings  - Neurology following at OSH for pain and numbness of upper extremities x3 mos likely  neuropathy or myopathy r/t  underlying rheum condition, planned for EMG outpt w/Dr. Marie or Dr. Urbano  - Palliative following  - Social Work consult  - PT consult     PULMONARY  Acute hypoxemic respiratory failure 2/2 bacterial PNA vs atypical PNA vs aspiration vs AEILD. Intubated on 9/13  requiring VV ECMO. Patient had reported chronic SOB for several months, had a CT scan that was negative for PE. She also states that she was seen by a pulmonologist and rheumatology, where they ruled out lupus and other immunological disorders.  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   #Acute on Chronic Hypoxemic Respiratory Failure  # VV ECMO  - Current settings: PC  RR 12, PS 12, PEEP 12 Fi02 40% Ppeak: 24 pulling TV ~40-70  - Emergency settings: VC 28/400/12/100%  - Bronchoscopy showed mild thick yellow secretions in trachea, diffuse moderate thin green/brown secretions throughout, follow up BAL cultures  - airway clearance to assist facilitate secretion mobilization with IPV and duonebs  - f/u BAL cultures, urine strep and legionella  - c/w empiric abx       ECMO  VV ECMO		  Cannulation sites/dates: 9/13/23 R fem 25F. RIJ 19F   Flow: 3.75		P1: 176  	Delta P: 22  RPM: 2880		P2: 154		SVO2: 80  Sweep:   4     L/min:		FIO2:   100      ECMO Calculated CO:  4.9  DO2/VO2: .08  VO2/DO2: 12.037    - right groin cannula pulled-back ~3-4cm to position tip of cannula just inferior to hepatic vein.  - clinically perfusing. Lactate: 1.9  - ECMO sweep sighing q1hr  - Adjust circuit flow as tolerated with DO2:VO2 goal >2  - Monitor for hemolysis, chatter, evidence of recirculation,       CARDIOVASCULAR  #Tachyarrhythmia  #AF RVR  #Distributive Shock  Hx of Afib w at OSH, started on Amiodarone gtt now in shock state requiring low dose pressors most likely vasoplegic in the setting sedation, now with bradycardia   Shock state requiring pressors   - likely septic with component of vaspoplegia i/s/o sedation, possible contribution of cardiogenic from RV dysfunction  - c/w norepinephrine, will wean off Roosevelt today maintaining map >65 and continue Vasopressin for now while on NO2  - discontinue amio gtt iso bradycardia, converted to sinus 9/14  - TTE 9/12 with EF 65-70% with normal LV and RV  - TTE 9/14 with  EF 18%. Severe global LV systolic dysfunction. RV enlargement with decreased RV systolic function, however RITCHIE and recent POCUS shows normal LV systolic function, normal RV systolic function with moderate enlargement. VTi 23.4  - POCUS:  Normal LVSF. RV systolic function improved from yesterday  - CT negative for PE    #Concern for Right Heart dysfunction  #Mild pHTN  - Likely i/s/o hypoxic vasoconstriction and high PEEP  - s/p milrinone gtt, d/c'd 9/13  - No PE seen on invasive pulmonary angiography at time of ECMO cannulation or in recent imaging  - NO2 lowered to 10ppm, will continue to wean while watching RV function       RENAL  sCr baseline 0.78  - Creatinine wnl, 0.83 today   - monitor i/o's, positive +1L LOS/ positive +1.6 L 24hrs  - s/p lasix, goal to keep 1-1.5Liters net negative,   - plan for hemoconcentrator to assist with fluid removal  - indwelling catheter in place, making good UO  - monitor, Replete to K>4, Phos>3, Mg>2, iCa>1      GASTROINTESTINAL  # Nutrition  #Transaminase elevation  - LFT's elevated, now slightly downtrending, however T.bili is high and uptrending, likely 2/2 combination of shock liver and malposition of ECMO cannula, if not improving can consider pulling back ECMO cannula  - Triglycerides elevated 836, currently on insulin gtt for hyperglycemia, will continue to monitor and consider using ketamine/precedex for sedation in place of propofol if continues to rise  - RUQ US 9/15 shows fatty infiltration of liver, with no cholelithiasis or biliary ductal dilatation.  - continue tube feeds via NGT  - continue PPI iso steroids   - continue bowel regimen senna and miralax and naloxogel to avoid opioid induced constipation  - nutrition following    INFECTIOUS DISEASE  No leukocytosis, afebrile  - s/p zosyn at OSH (9/12) for lingula PNA  - c/w empiric donald (9/13- ) for 7 day course  - can d/c vancomycin (9/12-9/15 ) negative mrsa PCR, and azithro (9/14-9/15) neg Urine legionella  - c/w Bactrim DS for PJP prophylaxis  - 9/13 Ucx and blood cx ngtd, RVP negative  - f/u BAL cyto, cell count, cultures, PCP PCR, fungitell, aspergillus  - f/u blood and urine cultures       ENDOCRINE  # Hyperglycemia i/s/o steroids  Admission A1c 6.1  - hyperglycemia iso steroids   - blood glucose remained elevated despite increasing -399  - start insulin gtt for tighter glycemic control, and now found to have elevated triglycerides  - monitor closely and adjust as needed      HEME  # ECMO anticoagulation  - continue argatroban gtt goal aPTT 50-70  - INR elevated likely iso argatroban and liver dysfxn, s/p Vit. K and FFP x1  - fibrinogen 237  - Hgb stable 11.9  - Transfuse for Hgb <7, Plt<10    #R/O DVT  - LE duplex negative for DVT but will repeat  - f/u duplex    MSK  #Hx Sciatica  - c/o B/l hand and arm weakness and numbness ?2/2 to radiculopathy, unnclear etiology, likely myositis since pt has been having worsening weakness with movement of extremities vs Cellcept s/e. Symptoms improving subjectively.  - f/u with Dr. Nik Marie or Dante Urbano for EMG upon discharge (osh?)      RHEUM  - CT findings at OSH consistent with ILD   - CT findings, Improved/decreased extent of bilateral groundglass opacities and reticular markings compared to the previous study. Findings are nonspecific but differential etiologies include interstitial pneumonia, drug toxicity, nonspecific connective tissue disorders.  - OSH labs show strongly positive ARIANA, RNP, and anti smith antibodies with low C4 and normal C3. 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 First session today  (9/15), next session planned for Sunday 0730am (will need to coordinate with perfusion)  - plan to start Cyclophosphamide, but due to the patient's severe hepatitis and elevated LFT's tomorrow, we may consider using Rituximab as an alternative immunosuppressive      PROPHYLAXIS  # DVT: argatroban  # GI: TF      LINES  -Ecmo Cannulas  -[facility] TLC- 9/13 (placed at OSH)  -Left Axill Traci - 9/13 (placed at OSH)      GOC  -Palliative Following    DISPO: ICU  CODE STATUS: Full

## 2023-09-15 NOTE — PROGRESS NOTE ADULT - ASSESSMENT
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 Initially admitted for acute hypoxemic respiratory failure and ILD-like findings, she responded to prednisone 40 mg daily but developed weakness/tachycardia with Mycophenolate mofetil 500mg BID (A fibr with RVR). Despite interventions, her condition worsened, intubated and ECMO transfer.    # Acute severe hepatitis -likely due to hypoperfusion   # SLE/ MCTD with DAH  - Skin rash on chest with periungual ulcers, ILD, leukopenia, thrombocytopenia.   - Serology: ARIANA 1:1280 Speckled, dsDNA <12. + SM, + Anti-RNP, U1-snRNP RNP A 162, U1-snRNP RNP-70kd 111  - CT chest (8/28): Since August 26, 2023, again interstitial lung disease non-UIP pattern. Although no subpleural sparing, possibly interstitialpneumonia, differential includes NSIP associated with connective tissue disorders, chronic HP or drug toxicity.  - Bronchoscopy results (08/30): Bronchial tissue and interstitium showing organizing pneumonia and focal fibrin  - CT chest (9/11): Improved/decreased extent of bilateral groundglass opacities and reticular markings compared to the previous study. Findings are nonspecific but differential etiologies include interstitial pneumonia, drug toxicity, nonspecific connective tissue disorders. New small focus of parenchymal consolidation seen in the lingula;   possible small focal pneumonia.  - Repeat Bronchoscopy (9/13): BAL performed of lingula. Serial BAL x3 performed of RML with progressively bloody return.  - Lab showed thrombocytopenia since she developed respiratory failure ( normal on the first admission to [facility])   - CRP 18, ESR 26 (8/30)    Recommendations:  - S/p solumedrol 1 gr for 3 days, please c/w solumedrol 1m g/kg from tomorrow   - Follow up labs : Anti B2 glycoprotein and anticardiolipin IgG, IgM, IgA  - Please c/w plasmapheresis  - Will start Rituximab 1 gr tomorrow morning     Rheumatology will follow the patient     D/w Dr. Luz Hollingsworth MD  PGY-4  Rheumatology Fellow  Reachable on TEAMS  [phone]

## 2023-09-15 NOTE — PROGRESS NOTE ADULT - ATTENDING COMMENTS
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    Patient was intially admitted to [facility] on 8/26 after p/w SOB and found to be hypoxemic. Of note, as per chart review and wife, patient has had SOB  for several months. They stayed in [place] from 11/2022 to 8/20/2023 and while there, patient noted onset of chronic SOB several months ago. Also endorse onset of debilitating b/l hand cramps and some muscles weakness several months as well. Patient went to see a neurologist and had an MRI which reportedly showed cervical spine issues for which she was recently started on Prednisone shortly before admission. Also endorses single episode of painful rash on her shins, ears and neck and chest which resolved with a steroid cream from a dermatologist.     During admission at [facility], patient underwent bronchoscopy with TBBX on 8/30 which showed NSIP/OP. Labs notable for positive ARIANA 1:1280, RNP > 8, Alejandro > 8. Patient was started on Solumedrol 60mg q6h from 9/1 to 9/6 then weaned over time to then Medrol 32 mg 9/9 to 9/12. Patient was also on Cellcept 9/8 to 9/11 but was stopped due to tachycardia. During this time, patient was on 2 to 4 L nc. Interval CTA chest on 9/11 also showed improved in reticular findings and diffuse GGO. On 9/12 early AM, patient had episode of SVT to 170s with then rapidly progressive hypoxemic respiratory failure leading to intubation this AM 9/13. Patient was restarted on empiric abx with Vanc and Zosyn on 9/12. Despite best practices, patient remamined hypoxemic and patient was cannulated for VV-ECMO on 9/13 and transferred to [facility].    #Shock - Likely mixed with newly decreased RV systolic function and RV dilation and distributive from sepsis  #Septic Shock  #RV dysfunction - Improved with serial TTE/RITCHIE  #Shock Liver  #Elevated bilirubin   #ARDS  #Multifocal Pneumonia  #MCTD-ILD  #Acute hypoxemic and hypercapnic respiratory failure - Differential includes bacterial PNA, atypical PNA, aspiration, AEILD. Angiogram during cannulation 9/13 negative for PE  #VV-ECMO  #Afib w/ RVR - Spontaneous cardioversion on Amio gtt  #Oropharyngeal dysphagia  #Hyperglycemia  - c/w vasopressors to maintain MAP > 65. Vasopressors continue to improve off milrinone stopped shortly after arrival, now only on Levo. Will monitor RV size and function with serial TTE/RITCHIE. Still mild to moderate RV enlargement but improved TAPSE 1.8 cm today  - Spontaneous cardioversion to sinus s/p amiodarone gtt. No MEREDITH thrombus on RITCHIE on 9/13. Monitor on tele  - c/w sedation, currently on Dilaudid and Propofol. Paralytics if unable to keep concordant with vent. Daily sedation vacation  - c/w mechanical ventilatory support, currently on rest settings PC RR 12, PS 12, PEEP 12, 100%, TV of about 30 to 50ccs  - Will wean NO to off today with close monitoring of RV size and function  - c/w VV-ECMO support, 25 Fr RIJ and 19Fr RFem. Pull cannula is within RA with tip near distal SVC. Venous SO2 high 70s to low 80s. Currently on Sweep 4, Flow 3.8.  - f/u BAL cyto, cell count, cultures, PCP PCR, fungitell, aspergillus  - f/u blood and urine cultures. MRSA PCR negative, urine legionella negative. RVP negative  - c/w empiric Wilmer. D/C Vanc and Azithro  - c/w pulse dose steroids for now (Day 3 of 3 today). Plan for PLEX via ECMO circuit today at 1PM with next session Sunday AM, discussed with Blood Bank and Perfusion. f/u Rheum re Cyclophosphamide vs Rituxan  - f/u additional Rheum labs re APLS  - Trend LFTs for shock liver, peaked but T bili/D bili still elevated. CTAP negative for hepatobiliary pathology. Will order RUQ US. f/u acute hepatitis panel  - c/w Argatroban gtt, tranfuse as needed. Trend LDH and haptoglobin  - Start insulin gtt for persistent hyperglycemia   - Hemoconcentrator for goal net negative 1 to 1.5 L today    #GOC - Full Code    #DVT ppx - Argatroban gtt    #POCUS - See POCUS note    Total time spent on VV ECMO management was 30 minutes, separate from time spent on critical care management, procedures, and teaching.    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 for acute hypoxemic and hypercapnic respiratory failure requiring mechanical ventilatory and VV-ECMO.    Patient was intially admitted to [facility] on 8/26 after p/w SOB and found to be hypoxemic. Of note, as per chart review and wife, patient has had SOB  for several months. They stayed in [place] from 11/2022 to 8/20/2023 and while there, patient noted onset of chronic SOB several months ago. Also endorse onset of debilitating b/l hand cramps and some muscles weakness several months as well. Patient went to see a neurologist and had an MRI which reportedly showed cervical spine issues for which she was recently started on Prednisone shortly before admission. Also endorses single episode of painful rash on her shins, ears and neck and chest which resolved with a steroid cream from a dermatologist.     During admission at [facility], patient underwent bronchoscopy with TBBX on 8/30 which showed NSIP/OP. Labs notable for positive ARIANA 1:1280, RNP > 8, Alejandro > 8. Patient was started on Solumedrol 60mg q6h from 9/1 to 9/6 then weaned over time to then Medrol 32 mg 9/9 to 9/12. Patient was also on Cellcept 9/8 to 9/11 but was stopped due to tachycardia. During this time, patient was on 2 to 4 L nc. Interval CTA chest on 9/11 also showed improved in reticular findings and diffuse GGO. On 9/12 early AM, patient had episode of SVT to 170s with then rapidly progressive hypoxemic respiratory failure leading to intubation this AM 9/13. Patient was restarted on empiric abx with Vanc and Zosyn on 9/12. Despite best practices, patient remamined hypoxemic and patient was cannulated for VV-ECMO on 9/13 and transferred to [facility].    #Shock - Likely mixed with newly decreased RV systolic function and RV dilation and distributive from sepsis  #Septic Shock  #RV dysfunction - Improved with serial TTE/RITCHIE  #Shock Liver  #Elevated bilirubin   #ARDS  #Multifocal Pneumonia  #MCTD-ILD  #Acute hypoxemic and hypercapnic respiratory failure - Differential includes bacterial PNA, atypical PNA, aspiration, AEILD. Angiogram during cannulation 9/13 negative for PE  #VV-ECMO  #Afib w/ RVR - Spontaneous cardioversion on Amio gtt  #Oropharyngeal dysphagia  #Hyperglycemia  - c/w vasopressors to maintain MAP > 65. Vasopressors continue to improve off milrinone stopped shortly after arrival, now only on Levo. Will monitor RV size and function with serial TTE/RITCHIE. Still mild to moderate RV enlargement but improved TAPSE 1.8 cm today  - Spontaneous cardioversion to sinus s/p amiodarone gtt. No MEREDITH thrombus on RITCHIE on 9/13. Monitor on tele  - c/w sedation, currently on Dilaudid and Propofol. Paralytics if unable to keep concordant with vent. Daily sedation vacation  - c/w mechanical ventilatory support, currently on rest settings PC RR 12, PS 12, PEEP 12, 100%, TV of about 30 to 50ccs  - Will wean NO to off today with close monitoring of RV size and function  - c/w VV-ECMO support, 25 Fr RIJ and 19Fr RFem. Pull cannula is appropriately positioned after pull back yesterday. Venous SO2 high 70s to low 80s. Currently on Sweep 4, Flow 3.8.  - f/u BAL cyto, cell count, cultures, PCP PCR, fungitell, aspergillus  - f/u blood and urine cultures. MRSA PCR negative, urine legionella negative. RVP negative  - c/w empiric Wilmer. D/C Vanc and Azithro  - c/w pulse dose steroids for now (Day 3 of 3 today). Plan for PLEX via ECMO circuit today at 1PM with next session Sunday AM, discussed with Blood Bank and Perfusion. f/u Rheum re Cyclophosphamide vs Rituxan  - f/u additional Rheum labs re APLS  - Trend LFTs for shock liver, peaked but T bili/D bili still elevated. CTAP negative for hepatobiliary pathology. Will order RUQ US. f/u acute hepatitis panel  - c/w Argatroban gtt, tranfuse as needed. Trend LDH and haptoglobin  - Start insulin gtt for persistent hyperglycemia   - Hemoconcentrator for goal net negative 1 to 1.5 L today  - Increase bowel regimen    #GOC - Full Code    #DVT ppx - Argatroban gtt    #POCUS - See POCUS note    Total time spent on VV ECMO management was 30 minutes, separate from time spent on critical care management, procedures, and teaching.    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    Patient was intially admitted to [facility] on 8/26 after p/w SOB and found to be hypoxemic. Of note, as per chart review and wife, patient has had SOB  for several months. They stayed in [place] from 11/2022 to 8/20/2023 and while there, patient noted onset of chronic SOB several months ago. Also endorse onset of debilitating b/l hand cramps and some muscles weakness several months as well. Patient went to see a neurologist and had an MRI which reportedly showed cervical spine issues for which she was recently started on Prednisone shortly before admission. Also endorses single episode of painful rash on her shins, ears and neck and chest which resolved with a steroid cream from a dermatologist.     During admission at [facility], patient underwent bronchoscopy with TBBX on 8/30 which showed NSIP/OP. Labs notable for positive ARIANA 1:1280, RNP > 8, Alejandro > 8. Patient was started on Solumedrol 60mg q6h from 9/1 to 9/6 then weaned over time to then Medrol 32 mg 9/9 to 9/12. Patient was also on Cellcept 9/8 to 9/11 but was stopped due to tachycardia. During this time, patient was on 2 to 4 L nc. Interval CTA chest on 9/11 also showed improved in reticular findings and diffuse GGO. On 9/12 early AM, patient had episode of SVT to 170s with then rapidly progressive hypoxemic respiratory failure leading to intubation this AM 9/13. Patient was restarted on empiric abx with Vanc and Zosyn on 9/12. Despite best practices, patient remamined hypoxemic and patient was cannulated for VV-ECMO on 9/13 and transferred to [facility].    #Shock - Likely mixed with newly decreased RV systolic function and RV dilation and distributive from sepsis  #Septic Shock  #RV dysfunction - Improved with serial TTE/RITCHIE  #Shock Liver  #Elevated bilirubin   #ARDS  #Multifocal Pneumonia  #MCTD-ILD  #Acute hypoxemic and hypercapnic respiratory failure - Differential includes bacterial PNA, atypical PNA, aspiration, AEILD. Angiogram during cannulation 9/13 negative for PE  #VV-ECMO  #Afib w/ RVR - Spontaneous cardioversion on Amio gtt  #Oropharyngeal dysphagia  #Hyperglycemia  - c/w vasopressors to maintain MAP > 65. Vasopressors continue to improve off milrinone stopped shortly after arrival, now only on Levo. Will monitor RV size and function with serial TTE/RITCHIE. Still mild to moderate RV enlargement but improved TAPSE 1.8 cm today  - Spontaneous cardioversion to sinus s/p amiodarone gtt. No MEREDITH thrombus on RITCHIE on 9/13. Monitor on tele  - c/w sedation, currently on Dilaudid and Propofol. Paralytics if unable to keep concordant with vent. Daily sedation vacation  - c/w mechanical ventilatory support, currently on rest settings PC RR 12, PS 12, PEEP 12, 100%, TV of about 30 to 50ccs  - Will wean NO to off today with close monitoring of RV size and function  - c/w VV-ECMO support, 25 Fr RIJ and 19Fr RFem. Pull cannula is appropriately positioned after pull back yesterday. Venous SO2 high 70s to low 80s. Currently on Sweep 4, Flow 3.8.  - f/u BAL cyto, cell count, cultures, PCP PCR, fungitell, aspergillus  - f/u blood and urine cultures. MRSA PCR negative, urine legionella negative. RVP negative  - c/w empiric Wilmer. D/C Vanc and Azithro  - c/w pulse dose steroids for now (Day 3 of 3 today). Plan for PLEX via ECMO circuit today at 1PM with next session Sunday AM, discussed with Blood Bank and Perfusion. f/u Rheum re Cyclophosphamide vs Rituxan  - f/u additional Rheum labs re APLS  - Trend LFTs for shock liver, peaked but T bili/D bili still elevated. CTAP negative for hepatobiliary pathology. Will order RUQ US. f/u acute hepatitis panel  - c/w Argatroban gtt, tranfuse as needed. Trend LDH and haptoglobin  - Start insulin gtt for persistent hyperglycemia   - Hemoconcentrator for goal net negative 1 to 1.5 L today  - Increase bowel regimen  - c/w PT/OT for passive ROM exercises  - Palliative eval appreciated    #GOC - Full Code    #DVT ppx - Argatroban gtt    #POCUS - See POCUS note    Total time spent on VV ECMO management was 30 minutes, separate from time spent on critical care management, procedures, and teaching.    Jozef Borden MD  Pulmonary & Critical Care

## 2023-09-15 NOTE — PROGRESS NOTE ADULT - SUBJECTIVE AND OBJECTIVE BOX
INTERVAL HX:  Patient started on PLEX today  Updated the patient HCP regarding Rituximab treatment which will be start tomorrow morning   Pt still on ECMO      HISTORY OF PRESENT ILLNESS:    62 year old female with a past medical history of afib, and sciatica, who was transferred from [facility] for ECMO. Patient was inittially presented [facility] for worsening shortness of breath, found to be hypoxic and CT chest revealed have interstitial lung disease appearance. She was admitted 8/26 for AHRF, further ILD workup and management. TTE 8/26 with normal LVEF. Pt was started on prednisone 40 mg IV daily  with gradually improving O2 requirement and has been stable on 2-3 LNC until 9/3. Rheuamtology was consulted, she was started on Mycophenolate mofetil 500 mg BID on 9/8 but she developed tachycardia and MMF was discontinueds.  Repat CTA chest (8/23) showed interval improvements in GGO but possible lingular bleb and RML bronchiectasis.  Started on empiric vancomycin and zosyn given concern for possible pneumonia on repeat CT scan.  Patient acceded to [facility] in which she desatted and presented with increased wob for which she was transitioned to BiPAP.  Patient saturation was dropped to 70s. STAT CXR showed increased pulmonary congestion for which patient was administered lasix without improvement. Intubated and started on mechanical ventilation but required very high PEEP (18) and 100% FiO2 and still had low saturations.  ECMO team was consulted and accepted to [facility] yesterday.  As per the EMR, she has been having chronic SOB for several months and got worked up in [place] where she had a CT scan in June, 23 that was negative for PE. Had "bronchial infection" in [place] 4 months ago and her symptoms have persisted since. She also states that she was seen by a pulmonologist and rheumatology, where they ruled out lupus and other immunological disorders. 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  Prior to hospitalization, She was having bilateral hand pain with paresthesias,  rash on chest wall, heaviness of legs without muscle weakness, oral ulcers, she was started on prednisone 20 mg bid prior to admission. The patient was Pt initialy went to the urgent care where she had a CXR performed concerning for b/l lower infiltrates and saturation was 88%  and was brought to the [facility] ED by car from the ambulance.     Denies any family history of lung disease, lung cancer, or autoimmune/rheumatological diseases..  Never smoker.      MEDICATIONS  (STANDING):  aMIOdarone Infusion 0.5 mG/Min (16.7 mL/Hr) IV Continuous <Continuous>  argatroban Infusion 0.1 MICROgram(s)/kG/Min (0.77 mL/Hr) IV Continuous <Continuous>  azithromycin  IVPB 500 milliGRAM(s) IV Intermittent every 24 hours  azithromycin  IVPB      chlorhexidine 0.12% Liquid 15 milliLiter(s) Oral Mucosa every 12 hours  chlorhexidine 2% Cloths 1 Application(s) Topical <User Schedule>  cisatracurium Injectable 20 milliGRAM(s) IV Push once  dextrose 5%. 1000 milliLiter(s) (50 mL/Hr) IV Continuous <Continuous>  dextrose 5%. 1000 milliLiter(s) (100 mL/Hr) IV Continuous <Continuous>  dextrose 50% Injectable 25 Gram(s) IV Push once  dextrose 50% Injectable 12.5 Gram(s) IV Push once  dextrose 50% Injectable 25 Gram(s) IV Push once  fentaNYL    Injectable 100 MICROGram(s) IV Push once  glucagon  Injectable 1 milliGRAM(s) IntraMuscular once  HYDROmorphone Infusion. 1 mG/Hr (1 mL/Hr) IV Continuous <Continuous>  insulin lispro (ADMELOG) corrective regimen sliding scale   SubCutaneous every 6 hours  insulin NPH human recombinant 3 Unit(s) SubCutaneous every 6 hours  meropenem  IVPB      meropenem  IVPB 1000 milliGRAM(s) IV Intermittent every 8 hours  methylPREDNISolone sodium succinate IVPB 1000 milliGRAM(s) IV Intermittent once  milrinone Infusion 0.375 MICROgram(s)/kG/Min (14.8 mL/Hr) IV Continuous <Continuous>  multivitamin  Chewable 1 Tablet(s) Chew daily  naloxegol 25 milliGRAM(s) Oral daily  norepinephrine Infusion 0.3 MICROgram(s)/kG/Min (36.9 mL/Hr) IV Continuous <Continuous>  pantoprazole  Injectable 40 milliGRAM(s) IV Push daily  phenylephrine    Infusion 1.5 MICROgram(s)/kG/Min (36.9 mL/Hr) IV Continuous <Continuous>  polyethylene glycol 3350 17 Gram(s) Oral two times a day  propofol Infusion 15.001 MICROgram(s)/kG/Min (11.8 mL/Hr) IV Continuous <Continuous>  senna Syrup 10 milliLiter(s) Oral two times a day  trimethoprim  40 mG/sulfamethoxazole 200 mG Suspension 160 milliGRAM(s) Oral daily  vancomycin  IVPB 1750 milliGRAM(s) IV Intermittent every 12 hours  vasopressin Infusion 0.06 Unit(s)/Min (9 mL/Hr) IV Continuous <Continuous>    MEDICATIONS  (PRN):  dextrose Oral Gel 15 Gram(s) Oral once PRN Blood Glucose LESS THAN 70 milliGRAM(s)/deciliter      Allergies: No Known Allergies        PAST MEDICAL & SURGICAL HISTORY:        Vital Signs Last 24 Hrs  T(C): 35.4 (14 Sep 2023 08:00), Max: 36.5 (13 Sep 2023 15:30)  T(F): 95.7 (14 Sep 2023 08:00), Max: 97.7 (13 Sep 2023 15:30)  HR: 51 (14 Sep 2023 09:00) (51 - 156)  BP: --  BP(mean): --  RR: 12 (14 Sep 2023 09:00) (0 - 25)  SpO2: 100% (14 Sep 2023 09:00) (93% - 100%)    Parameters below as of 14 Sep 2023 09:00  Patient On (Oxygen Delivery Method): ventilator    O2 Concentration (%): 80    Physical Exam:  General: Intubated,   HEENT: EOMI, MMM  CVS: +S1/S2, RRR, no murmurs/rubs/gallops  Resp: B/L crackles from upper to lower zones  GI: Soft, NT/ND +BS  MSK: no swelling/warmth/erythema of the joints of the UE/LE, periungual ulcers with mild rash on MCP and PIP joints       LABS:  09-14 @ 13:05 Creatine 85 U/L [25 - 170]  09-14 @ 04:00 Creatine 85 U/L [25 - 170]  cret                        12.2   2.54  )-----------( 66       ( 15 Sep 2023 14:58 )             36.3     09-15    143  |  99  |  42<H>  ----------------------------<  279<H>  3.4<L>   |  28  |  0.85    Ca    8.5      15 Sep 2023 14:58  Phos  3.2     09-15  Mg     2.30     09-15    TPro  5.3<L>  /  Alb  3.1<L>  /  TBili  2.8<H>  /  DBili  x   /  AST  376<H>  /  ALT  661<H>  /  AlkPhos  69  09-15    PT/INR - ( 15 Sep 2023 08:36 )   PT: 21.9 sec;   INR: 1.98 ratio         PTT - ( 15 Sep 2023 14:58 )  PTT:45.0 sec              Culture - Bronchial (collected 09-13-23 @ 15:45)  Source: .Bronchial Bronchial Lavage  Gram Stain (09-14-23 @ 07:27):    Rare polymorphonuclear leukocytes seen per low power field    No squamous epithelial cells seen per low power field    No organisms seen per oil power field        Rheumatology Work Up    Creatine Kinase, Serum: 43 U/L [25 - 170] (09-11-23 @ 05:30)  Protein/Creatinine Ratio Calculation: 0.2 Ratio [0.0 - 0.2] (08-31-23 @ 07:41)  Sedimentation Rate, Erythrocyte: 26 mm/Hr *H* [[facility] performs Erythrocyte Sedimentation Rate assay  utilizing the Westergren method] (08-30-23 @ 06:08)  C-Reactive Protein, Serum: 18.1 mg/L *H* [0.0 - 4.0] (08-30-23 @ 06:08)    ARIANA 1:1280 Speckled  DsDNA <12  SM (Alejandro) Ab FBIA >8  Anti-RNP >8  c-ANCA Negative  p-ANCA Negative  Atypical ANCA Indeterminate  Anti-SS-A Ab 0.4  Anti-SS-B Ab <0.2  RF Quant <10  CCP Ab IgG Negative  Centromere Ab <0.2  RNA Polymerase III IgG 16    Systemic Sclerosis 12 Ab Pnl 2 (08- 05:30)  CCP <8  Scl 70 <11  CENP-A <11  RP11 <11   <11  U1-snRNP RNP A 162  U1-snRNP RNP C 71  U1-snRNP RNP-70kd 111  Fibrillarin <11  Th/To <11  PM/Scl-100 <11  PM/Scl-75 <11    Hypersensitivity Pneumonitis Panel – Negative (08- 6:06)    Bronchoscopy results (08- 12:54)  -	Fungitell B-D Galactomanan 93   -	Aspergillus Galactomannan Antigen 1.75  -	Appearance – Hazy  -	Color – No Color  -	Total Nucleated Cell Count – 19   -	Total RBC Count – 1123   -	Neutrophils 13  -	BF Lymphocytes 2  -	Monocytes/Macrophages 4   -	Cytopathology  o	Final Diagnosis  o	BRONCHOALVEOLAR LAVAGE, RIGHT LOWER LOBE:  o	NEGATIVE FOR MALIGNANT CELLS.  o	Pulmonary macrophages, bronchial cells and squamous cells.  -	Surgical Pathology  o	Final Diagnosis  o	Lung, right lower lobe; biopsy:  o	Bronchial tissue and interstitium showing organizing pneumonia and focal fibrin - See Note.  o	Note: Multiple deeper levels were performed on block 1A. The histologic findings are that of organizing pneumonia and focal fibrin. No granuloma, neoplasm or interstitial fibrosis is identified. Clinical and radiographic correlation is suggested. This case was also reviewed virtually at the [facility] Pathology Consensus Conference on 8/31/2023, and again virtually to Dr. Andino on 9/1/2023.  -	TM Interpretation  o	Diagnosis:  o	Bronchioalveolar Lavage  o	Flow cytometric analysis attempted, however insufficient cells to report.      BRONCHOSCOPY (08- 11:30)  Procedure Performed: Bronchoscopy with BAL and TBBx    The bronchoscope was inserted with ease and the airway examined to subsegments bilaterally. BAL performed in RB8 followed by TBBX performed in RB8 3 subsegments. Patient tolerated procedure well.    Limitations/Complications:  There were no apparent limitations or complications    Findings:  Normal distal trachea and main [place]. Scant mucous bilaterally. No endobronchial lesions. Mildly ectatic airways.    Impressions:  Normal anatomy, mildly ectatic airways and scant secretions. .    Plan:  CXR in endo may need higher supplemental oxygen few hours after procedure, wean off, incentive spirometry    Specimens:  BAL for cell count, diff, CD4/8, micro, GM, fungitell and cyto. TBBX for path      RADIOLOGY & ADDITIONAL STUDIES:      CT Angio Chest PE Protocol w/ IV Cont (08.26.23 @ 15:10)   FINDINGS: No visualized PE.The pulmonary trunk measures 3.3 cm diameter.     The heart is enlarged.     Trace left lateral asymmetric pericardial effusion is seen.     The great vessels are unremarkable. Left vertebral artery arises   directly from the aortic arch.     No mediastinal or hilar lymphadenopathy is seen.    Mediastinal lipomatosis.    Evaluation of the pulmonary parenchyma demonstrates bilateral   interstitial lung disease in the upper, mid and lower zones with   peripheral groundglass opacities and reticulation. Less involvement of   the bilateral upper zones. No traction bronchiectasis or honeycombing.    Bilateral air trapping.     No pleural effusions are seen.    Limited evaluation of the upper abdomen demonstrates 1.6 cm hypodensity   in segment one likely cyst. Small to moderate hiatal hernia.    Evaluation of the osseous structures demonstrates no significant   abnormality.      IMPRESSION:  No visualized PE.    Bilateral interstitial lung disease. Differential diagnosis includes  HP,NSIP, atypical pneumonia.      CT Chest No Cont (08.28.23 @ 16:47) >  IMPRESSION:  1. Since August 26, 2023, again interstitial lung disease non-UIP   pattern. Although no subpleural sparing, possibly interstitialpneumonia,   differential includes NSIP associated with connective tissue disorders,   chronic HP or drug toxicity.      CT Angio Chest PE Protocol w/ IV Cont (09.11.23 @ 13:45) >  IMPRESSION:  1.  Improved/decreased extent of bilateral groundglass opacities and   reticular markings compared to the previous study. Findings are   nonspecific but differential etiologies include interstitial pneumonia,   drug toxicity, nonspecific connective tissue disorders.  2.  New small focus of parenchymal consolidation seen in the lingula;   possible small focal pneumonia.  3.  No pulmonary embolism      < end of copied text >    BRONCHOSCOPY (2nd)   · Procedure Date/Time	13-Sep-2023 17:18  · Pre-Bronchoscopy Tuberculosis Risk Assessment Screening(check 'Preview' tab for full text on these list values when selected)	I evaluated the patient prior to bronchoscopy procedure for active pulmonary/laryngeal M. tuberculosis disease and the risk and actions taken:    Low risk with routine standard of care measures followed.  · Informed Consent	Benefits, risks, and possible complications of procedure explained to patient/caregiver who verbalized understanding and gave written consent.  · Procedure Performed By	Miky Borden  · Procedure Details	  Bronchoscope inserted through ETT. ETT noted to be in good position. Airway evaluation revealed mild thick yellow secretions in trachea. Diffuse moderate thin green/brown secretions throughout. Secretions therapeutically suctioned. BAL performed of lingula. Serial BAL x3 performed of RML with progressively bloody return.  Bronchoscope then withdrawn from ETT.    · Specimens	Cell Count, Gram Stain and Culture, Fungal Culture, Acid Fast Culture, Cytopathology

## 2023-09-15 NOTE — CHART NOTE - NSCHARTNOTEFT_GEN_A_CORE
: Michi Bolaños    INDICATION: Shock, Respiratory failure    PROCEDURE:  [X] LIMITED ECHO  [X] LIMITED CHEST  [ ] LIMITED RETROPERITONEAL  [ ] LIMITED ABDOMINAL  [ ] LIMITED DVT  [ ] NEEDLE GUIDANCE VASCULAR  [ ] NEEDLE GUIDANCE THORACENTESIS  [ ] NEEDLE GUIDANCE PARACENTESIS  [ ] NEEDLE GUIDANCE PERICARDIOCENTESIS  [ ] OTHER    FINDINGS: Diffuse B line predominant pattern with irregular pleura. Bibasilar consolidation pattern. Normal LV systolic function. RV equal to LV in size. TAPSE 1.8 cm. IVC 1.9 cm. ECMO drainage cannula tip at IVC/RA junction.      INTERPRETATION: Lung US c/w ARDS. Normal LV systolic function. Moderate RV enlargement. Improved RV systolic function. IVC indeterminate. ECMO drainage cannula appropriately positioned.    Images stored on [employer].    Jozef Borden MD  Pulmonary & Critical Care

## 2023-09-15 NOTE — CHART NOTE - NSCHARTNOTEFT_GEN_A_CORE
:  Enzo Bolaños     INDICATION: ECMO     PROCEDURE:   [x] LIMITED ECHO  [x] LIMITED CHEST  [ ] LIMITED RETROPERITONEAL  [ ] LIMITED ABDOMINAL  [ ] LIMITED DVT  [ ] NEEDLE GUIDANCE VASCULAR  [ ] NEEDLE GUIDANCE THORACENTESIS  [ ] NEEDLE GUIDANCE PARACENTESIS  [ ] NEEDLE GUIDANCE PERICARDIOCENTESIS  [ ] OTHER     FINDINGS:   Lung  - bilateral B lines with pleural irregularities  - trace pleural effusions bilat  - bilat basal consolidations    Cardiac  -PSL - normal LV function, no sig MR/AR  -PSS - normal LV function, no septal D-ing, PVaccT 95ms  -A4c - RV=LV. LVOT VTI 23.4cm, MV E 0.68m/s, A 0.77m/s, E/A 0.87, E' .07m/s, E/E' 9.97, TAPSE 1.8  -SX - IVC range 1.7-2.1 cm, cannula located in IVC at the level of hepatic vein        INTERPRETATION:   - consolidated lungs with irregular pleura and infiltrate  - improvement in cardiac funtion from yesteray  - improvement in RV size and function   - diastology consistent with impaired relaxation    Images stored in [employer]    Supervised by Dr Borden    Should not be considered final until signed by attending. :  Enzo Bolaños     INDICATION: ECMO     PROCEDURE:   [x] LIMITED ECHO  [x] LIMITED CHEST  [ ] LIMITED RETROPERITONEAL  [ ] LIMITED ABDOMINAL  [ ] LIMITED DVT  [ ] NEEDLE GUIDANCE VASCULAR  [ ] NEEDLE GUIDANCE THORACENTESIS  [ ] NEEDLE GUIDANCE PARACENTESIS  [ ] NEEDLE GUIDANCE PERICARDIOCENTESIS  [ ] OTHER     FINDINGS:   Lung  - bilateral B lines with pleural irregularities  - trace pleural effusions bilat  - bilat basal consolidations    Cardiac  -PSL - normal LV function, no sig MR/AR  -PSS - normal LV function, no septal D-ing, PVaccT 95ms  -A4c - RV=LV. LVOT VTI 23.4cm, MV E 0.68m/s, A 0.77m/s, E/A 0.87, E' .07m/s, E/E' 9.97, TAPSE 1.8  -SX - IVC range 1.7-2.1 cm, cannula located in IVC at the level of hepatic vein        INTERPRETATION:   - consolidated lungs with irregular pleura and infiltrate  - improvement in cardiac funtion from yesteray  - improvement in RV size and function   - diastology consistent with impaired relaxation    Images stored in [employer]he    Supervised by Dr Borden    Should not be considered final until signed by attending.    Attending Attestation:  I was present during the key portions of the procedure and immediately available during the entire procedure.  Jozef Borden MD  Attending  Pulmonary & Critical Care Medicine

## 2023-09-15 NOTE — PROGRESS NOTE ADULT - SUBJECTIVE AND OBJECTIVE BOX
Interval Events:    HPI:  62 year old female with a past medical history of afib, and sciatica, who presented to [facility] for shortness of breath. She had gone to [facility] where she had CXR which showed bilateral lower lobe infiltrates so was sent to ER. She had been having exertional dyspnea and hypoxemia (on home pulse ox to 82%). She had been having dyspnea for several months and had a workup in [place] with a CT scan (which was negative for PE). She also states that she was seen by a pulmonologist and rheumatology, where they ruled out lupus and other immunological disorders.    [facility] Hospital Course  Found to be hypoxic and have interstitial lung disease appearance on CT, admitted 8/26 for AHRF, further ILD workup and management. TTE 8/26 with normal LVEF. Pt was started on prednisone on admission with gradually improving O2 requirement and has been stable on 2-3LNC since 9/3. Started steroid taper on 9/6 and fully transitioned to PO 9/8 overnight. Started on Mycophenolate mofetil (cellcept) 9/8 evening but pt reported subjective side effects with weakness. Episode of AF w RVR with HR up to 140s on 9/11 am, decreased to HR 10-110s with IV lopressor 10. CTA negative for PE and showed interval improvements in GGO but possible lingular bleb and RML bronchiectasis. Patient received 2L of but before more fluids and beta-blocker pt developed heart palpitations with EKG HR 170s with SVT. BPs 105/70s. Did not respond to vagal maneuvers. Pt given oral lopressor 12.5 and IV lopressor 5, with improvement of HR to 130s. SBP briefly dropped to 60-70s then recovered. Patient on NRB offered HFNC/BiPAP but refused. Started on empiric vancomycin and zosyn. BCx w/ NGTD. Per pulm increased solumedrol to 40mg qd. Patient acceded to HFNC in which she desatted and presented with increased wob for which she was transitioned to BiPAP. Patient with anxiety while on BiPAP presenting tachypnea and desatting to the 80s despite verbal redirection for which she was administered ativan 1mg IVP x1. Patient distress improved with sats in the low 90s but had desaturation to 70s. STAT CXR showed increased pulmonary congestion for which patient was administered lasix without improvement. Intubated and started on mechanical ventilation but required very high PEEP (18) and 100% FiO2 and still had low saturations. VV ECMO team consulted and accepted to [facility] center. Per signout patient had RV enlargement and dysfunction on POCUS with concern for either new PE vs high pulmonary pressures due to PEEP 18 and lung dysfunction. Patient went for cannulation at [facility] with flouro showing no acute PE. Cannulated with 25 F drainage cannula in R Fem V and 23F “arterial” cannula in RIJ.    (13 Sep 2023 15:24)    REVIEW OF SYSTEMS:    [ ] All other systems negative  [x] Unable to assess ROS because patient is intubated and sedated        OBJECTIVE:  ICU Vital Signs Last 24 Hrs  T(C): 35.4 (15 Sep 2023 04:00), Max: 35.6 (14 Sep 2023 19:15)  T(F): 95.8 (15 Sep 2023 04:00), Max: 96.1 (14 Sep 2023 19:15)  HR: 65 (15 Sep 2023 05:00) (51 - 65)  BP: --  BP(mean): --  ABP: 115/67 (15 Sep 2023 05:00) (82/49 - 150/86)  ABP(mean): 87 (15 Sep 2023 05:00) (62 - 111)  RR: 12 (15 Sep 2023 05:00) (0 - 24)  SpO2: 95% (15 Sep 2023 05:00) (92% - 100%)    O2 Parameters below as of 15 Sep 2023 05:00  Patient On (Oxygen Delivery Method): ventilator    O2 Concentration (%): 40      Mode: AC/ CMV (Assist Control/ Continuous Mandatory Ventilation), RR (machine): 12, FiO2: 40, PEEP: 12, ITime: 1, MAP: 15, PC: 12, PIP: 24    09-13 @ 07:01 - 09-14 @ 07:00  --------------------------------------------------------  IN: 2644.3 mL / OUT: 3230 mL / NET: -585.7 mL    09-14 @ 07:01  -  09-15 @ 06:21  --------------------------------------------------------  IN: 3309 mL / OUT: 1445 mL / NET: 1864 mL      CAPILLARY BLOOD GLUCOSE      POCT Blood Glucose.: 307 mg/dL (15 Sep 2023 06:06)      Physical Exam:   Constitutional: no acute distress   HEENT: + PERRLA, EOMI, no drainage or redness  Neck: supple, RIJ ECMO cannula, LIJ TLC  Respiratory: Ventilator assisted breath Sounds equal & clear bilaterally to auscultation, no accessory muscle use noted  Cardiovascular: Sinus fady, regular rate, regular rhythm, normal S1, S2; no murmurs or rub  Gastrointestinal: Soft, non-tender, non distended, no hepatosplenomegaly, normal bowel sounds  Extremities: + 2 peripheral edema, R. Fem ECMO cannula  Vascular: Equal and normal pulses: 2+ peripheral pulses by doppler   Neurological: sedated/intubated  Psychiatric: calm, normal mood, normal affect  Musculoskeletal: No joint swelling or deformity; no limitation of movement  Skin: warm, dry, well perfused, no rashes      HOSPITAL MEDICATIONS:  Standing Meds:  albuterol/ipratropium for Nebulization 3 milliLiter(s) Nebulizer every 6 hours  argatroban Infusion 0.05 MICROgram(s)/kG/Min IV Continuous <Continuous>  azithromycin  IVPB 500 milliGRAM(s) IV Intermittent every 24 hours  azithromycin  IVPB      chlorhexidine 0.12% Liquid 15 milliLiter(s) Oral Mucosa every 12 hours  chlorhexidine 2% Cloths 1 Application(s) Topical <User Schedule>  cisatracurium Injectable 20 milliGRAM(s) IV Push once  dextrose 5%. 1000 milliLiter(s) IV Continuous <Continuous>  dextrose 5%. 1000 milliLiter(s) IV Continuous <Continuous>  dextrose 50% Injectable 25 Gram(s) IV Push once  dextrose 50% Injectable 12.5 Gram(s) IV Push once  dextrose 50% Injectable 25 Gram(s) IV Push once  fentaNYL    Injectable 100 MICROGram(s) IV Push once  glucagon  Injectable 1 milliGRAM(s) IntraMuscular once  HYDROmorphone Infusion. 1 mG/Hr IV Continuous <Continuous>  insulin lispro (ADMELOG) corrective regimen sliding scale   SubCutaneous every 6 hours  insulin NPH human recombinant 9 Unit(s) SubCutaneous every 6 hours  meropenem  IVPB      meropenem  IVPB 1000 milliGRAM(s) IV Intermittent every 8 hours  methylPREDNISolone sodium succinate IVPB 1000 milliGRAM(s) IV Intermittent once  multivitamin  Chewable 1 Tablet(s) Chew daily  naloxegol 25 milliGRAM(s) Oral daily  norepinephrine Infusion 0.3 MICROgram(s)/kG/Min IV Continuous <Continuous>  pantoprazole  Injectable 40 milliGRAM(s) IV Push daily  phenylephrine    Infusion 1.5 MICROgram(s)/kG/Min IV Continuous <Continuous>  polyethylene glycol 3350 17 Gram(s) Oral two times a day  potassium phosphate IVPB 15 milliMole(s) IV Intermittent once  propofol Infusion 15.001 MICROgram(s)/kG/Min IV Continuous <Continuous>  senna Syrup 10 milliLiter(s) Oral two times a day  trimethoprim  40 mG/sulfamethoxazole 200 mG Suspension 160 milliGRAM(s) Oral daily  vancomycin  IVPB 1500 milliGRAM(s) IV Intermittent every 12 hours  vasopressin Infusion 0.04 Unit(s)/Min IV Continuous <Continuous>      PRN Meds:  dextrose Oral Gel 15 Gram(s) Oral once PRN      LABS:                        11.7   4.14  )-----------( 65       ( 15 Sep 2023 03:30 )             34.5     Hgb Trend: 11.7<--, 11.6<--, 11.3<--, 11.2<--, 11.3<--  09-15    131<L>  |  94<L>  |  44<H>  ----------------------------<  359<H>  3.6   |  25  |  0.83    Ca    8.4      15 Sep 2023 03:30  Phos  2.2     09-15  Mg     2.20     09-15    TPro  4.7<L>  /  Alb  3.3  /  TBili  4.8<H>  /  DBili  x   /  AST  589<H>  /  ALT  850<H>  /  AlkPhos  74  09-15    Creatinine Trend: 0.83<--, 0.82<--, 0.86<--, 0.77<--, 0.87<--, 0.95<--  PT/INR - ( 15 Sep 2023 03:30 )   PT: 24.0 sec;   INR: 2.17 ratio         PTT - ( 15 Sep 2023 03:30 )  PTT:57.4 sec  Urinalysis Basic - ( 15 Sep 2023 03:30 )    Color: x / Appearance: x / SG: x / pH: x  Gluc: 359 mg/dL / Ketone: x  / Bili: x / Urobili: x   Blood: x / Protein: x / Nitrite: x   Leuk Esterase: x / RBC: x / WBC x   Sq Epi: x / Non Sq Epi: x / Bacteria: x      Arterial Blood Gas:  09-15 @ 03:30  7.40/48/104/30/98.6/4.1  ABG lactate: --  Arterial Blood Gas:  09-14 @ 23:36  7.40/45/117/28/99.3/2.6  ABG lactate: --  Arterial Blood Gas:  09-14 @ 17:15  7.35/51/114/28/98.6/1.8  ABG lactate: --  Arterial Blood Gas:  09-14 @ 13:05  7.35/45/125/25/99.4/-1.0  ABG lactate: --  Arterial Blood Gas:  09-14 @ 07:04  7.38/41/124/24/100.0/-0.8  ABG lactate: --  Arterial Blood Gas:  09-14 @ 04:00  7.35/41/120/23/99.3/-2.9  ABG lactate: --  Arterial Blood Gas:  09-13 @ 19:55  7.31/46/126/23/99.1/-3.2  ABG lactate: --  Arterial Blood Gas:  09-13 @ 15:44  7.31/43/78/22/96.2/-4.4  ABG lactate: --  Arterial Blood Gas:  09-13 @ 12:07  7.32/39/165/20/99.8/-5.6  ABG lactate: --  Arterial Blood Gas:  09-13 @ 11:20  7.37/37/86/21/98.6/-3.4  ABG lactate: --  Arterial Blood Gas:  09-13 @ 10:46  7.38/40/107/24/99.9/-1.3  ABG lactate: --  Arterial Blood Gas:  09-13 @ 10:29  7.22/38/153/16/99.5/-11.4  ABG lactate: --  Arterial Blood Gas:  09-13 @ 10:05  --/--/--/--/22.2/--  ABG lactate: --  Arterial Blood Gas:  09-13 @ 08:33  7.07/47/63/14/86.6/-16.3  ABG lactate: --  Arterial Blood Gas:  09-13 @ 08:21  7.05/47/74/13/91.2/-17.2  ABG lactate: --  Arterial Blood Gas:  09-13 @ 07:58  7.08/53/46/16/62.4/-14.4  ABG lactate: --  Arterial Blood Gas:  09-13 @ 07:26  7.10/60/57/19/79.4/-11.6  ABG lactate: --    Venous Blood Gas:  09-13 @ 12:46  7.29/49/39/24/60.4  VBG Lactate: --  Venous Blood Gas:  09-13 @ 12:26  7.32/44/51/23/82.9  VBG Lactate: --  Venous Blood Gas:  09-13 @ 10:08  --/--/--/--/22  VBG Lactate: --      MICROBIOLOGY:     Culture - Blood (collected 13 Sep 2023 17:51)  Source: .Blood Blood-Peripheral  Preliminary Report (14 Sep 2023 22:02):    No growth at 24 hours    Culture - Blood (collected 13 Sep 2023 17:44)  Source: .Blood Blood  Preliminary Report (14 Sep 2023 22:02):    No growth at 24 hours    Culture - Urine (collected 13 Sep 2023 17:25)  Source: Catheterized Catheterized  Final Report (14 Sep 2023 15:40):    No growth    Culture - Fungal, Bronchial (collected 13 Sep 2023 15:45)  Source: .Bronchial Bronchial Lavage  Preliminary Report (14 Sep 2023 14:07):    Testing in progress    Culture - Acid Fast - Bronchial w/Smear (collected 13 Sep 2023 15:45)  Source: .Bronchial Bronchial Lavage    Culture - Bronchial (collected 13 Sep 2023 15:45)  Source: .Bronchial Bronchial Lavage  Gram Stain (14 Sep 2023 07:27):    Rare polymorphonuclear leukocytes seen per low power field    No squamous epithelial cells seen per low power field    No organisms seen per oil power field      RADIOLOGY:  [ ] Reviewed and interpreted by me           Interval Events:  -Lasix 20mg IVP and additional 40mg IVP given this AM  -weaned off Vasopressin overnight  -remains on ECMO circuit fi02 100% sweep 4    HPI:  62 year old female with a past medical history of afib, and sciatica, who presented to [facility] for shortness of breath. She had gone to [facility] where she had CXR which showed bilateral lower lobe infiltrates so was sent to ER. She had been having exertional dyspnea and hypoxemia (on home pulse ox to 82%). She had been having dyspnea for several months and had a workup in [place] with a CT scan (which was negative for PE). She also states that she was seen by a pulmonologist and rheumatology, where they ruled out lupus and other immunological disorders.    [facility] Hospital Course  Found to be hypoxic and have interstitial lung disease appearance on CT, admitted 8/26 for AHRF, further ILD workup and management. TTE 8/26 with normal LVEF. Pt was started on prednisone on admission with gradually improving O2 requirement and has been stable on 2-3LNC since 9/3. Started steroid taper on 9/6 and fully transitioned to PO 9/8 overnight. Started on Mycophenolate mofetil (cellcept) 9/8 evening but pt reported subjective side effects with weakness. Episode of AF w RVR with HR up to 140s on 9/11 am, decreased to HR 10-110s with IV lopressor 10. CTA negative for PE and showed interval improvements in GGO but possible lingular bleb and RML bronchiectasis. Patient received 2L of but before more fluids and beta-blocker pt developed heart palpitations with EKG HR 170s with SVT. BPs 105/70s. Did not respond to vagal maneuvers. Pt given oral lopressor 12.5 and IV lopressor 5, with improvement of HR to 130s. SBP briefly dropped to 60-70s then recovered. Patient on NRB offered HFNC/BiPAP but refused. Started on empiric vancomycin and zosyn. BCx w/ NGTD. Per pulm increased solumedrol to 40mg qd. Patient acceded to HFNC in which she desatted and presented with increased wob for which she was transitioned to BiPAP. Patient with anxiety while on BiPAP presenting tachypnea and desatting to the 80s despite verbal redirection for which she was administered ativan 1mg IVP x1. Patient distress improved with sats in the low 90s but had desaturation to 70s. STAT CXR showed increased pulmonary congestion for which patient was administered lasix without improvement. Intubated and started on mechanical ventilation but required very high PEEP (18) and 100% FiO2 and still had low saturations. VV ECMO team consulted and accepted to [facility] center. Per signout patient had RV enlargement and dysfunction on POCUS with concern for either new PE vs high pulmonary pressures due to PEEP 18 and lung dysfunction. Patient went for cannulation at [facility] with flouro showing no acute PE. Cannulated with 25 F drainage cannula in R Fem V and 23F “arterial” cannula in RIJ.    (13 Sep 2023 15:24)    REVIEW OF SYSTEMS:    [ ] All other systems negative  [x] Unable to assess ROS because patient is intubated and sedated        OBJECTIVE:  ICU Vital Signs Last 24 Hrs  T(C): 35.4 (15 Sep 2023 04:00), Max: 35.6 (14 Sep 2023 19:15)  T(F): 95.8 (15 Sep 2023 04:00), Max: 96.1 (14 Sep 2023 19:15)  HR: 65 (15 Sep 2023 05:00) (51 - 65)  BP: --  BP(mean): --  ABP: 115/67 (15 Sep 2023 05:00) (82/49 - 150/86)  ABP(mean): 87 (15 Sep 2023 05:00) (62 - 111)  RR: 12 (15 Sep 2023 05:00) (0 - 24)  SpO2: 95% (15 Sep 2023 05:00) (92% - 100%)    O2 Parameters below as of 15 Sep 2023 05:00  Patient On (Oxygen Delivery Method): ventilator    O2 Concentration (%): 40      Mode: AC/ CMV (Assist Control/ Continuous Mandatory Ventilation), RR (machine): 12, FiO2: 40, PEEP: 12, ITime: 1, MAP: 15, PC: 12, PIP: 24    09-13 @ 07:01  -  09-14 @ 07:00  --------------------------------------------------------  IN: 2644.3 mL / OUT: 3230 mL / NET: -585.7 mL    09-14 @ 07:01  -  09-15 @ 06:21  --------------------------------------------------------  IN: 3309 mL / OUT: 1445 mL / NET: 1864 mL      CAPILLARY BLOOD GLUCOSE      POCT Blood Glucose.: 307 mg/dL (15 Sep 2023 06:06)      Physical Exam:   Constitutional: no acute distress   HEENT: + PERRLA, EOMI, no drainage or redness  Neck: supple, RIJ ECMO cannula, LIJ TLC  Respiratory: Ventilator assisted breath Sounds equal & clear bilaterally to auscultation, no accessory muscle use noted  Cardiovascular: Sinus fady, regular rate, regular rhythm, normal S1, S2; no murmurs or rub  Gastrointestinal: Soft, non-tender, non distended, no hepatosplenomegaly, normal bowel sounds  Extremities: + 2 peripheral edema, R. Fem ECMO cannula  Vascular: Equal and normal pulses: 2+ peripheral pulses by doppler   Neurological: sedated/intubated  Psychiatric: calm, normal mood, normal affect  Musculoskeletal: No joint swelling or deformity; no limitation of movement  Skin: warm, dry, well perfused, no rashes      HOSPITAL MEDICATIONS:  Standing Meds:  albuterol/ipratropium for Nebulization 3 milliLiter(s) Nebulizer every 6 hours  argatroban Infusion 0.05 MICROgram(s)/kG/Min IV Continuous <Continuous>  azithromycin  IVPB 500 milliGRAM(s) IV Intermittent every 24 hours  azithromycin  IVPB      chlorhexidine 0.12% Liquid 15 milliLiter(s) Oral Mucosa every 12 hours  chlorhexidine 2% Cloths 1 Application(s) Topical <User Schedule>  cisatracurium Injectable 20 milliGRAM(s) IV Push once  dextrose 5%. 1000 milliLiter(s) IV Continuous <Continuous>  dextrose 5%. 1000 milliLiter(s) IV Continuous <Continuous>  dextrose 50% Injectable 25 Gram(s) IV Push once  dextrose 50% Injectable 12.5 Gram(s) IV Push once  dextrose 50% Injectable 25 Gram(s) IV Push once  fentaNYL    Injectable 100 MICROGram(s) IV Push once  glucagon  Injectable 1 milliGRAM(s) IntraMuscular once  HYDROmorphone Infusion. 1 mG/Hr IV Continuous <Continuous>  insulin lispro (ADMELOG) corrective regimen sliding scale   SubCutaneous every 6 hours  insulin NPH human recombinant 9 Unit(s) SubCutaneous every 6 hours  meropenem  IVPB      meropenem  IVPB 1000 milliGRAM(s) IV Intermittent every 8 hours  methylPREDNISolone sodium succinate IVPB 1000 milliGRAM(s) IV Intermittent once  multivitamin  Chewable 1 Tablet(s) Chew daily  naloxegol 25 milliGRAM(s) Oral daily  norepinephrine Infusion 0.3 MICROgram(s)/kG/Min IV Continuous <Continuous>  pantoprazole  Injectable 40 milliGRAM(s) IV Push daily  phenylephrine    Infusion 1.5 MICROgram(s)/kG/Min IV Continuous <Continuous>  polyethylene glycol 3350 17 Gram(s) Oral two times a day  potassium phosphate IVPB 15 milliMole(s) IV Intermittent once  propofol Infusion 15.001 MICROgram(s)/kG/Min IV Continuous <Continuous>  senna Syrup 10 milliLiter(s) Oral two times a day  trimethoprim  40 mG/sulfamethoxazole 200 mG Suspension 160 milliGRAM(s) Oral daily  vancomycin  IVPB 1500 milliGRAM(s) IV Intermittent every 12 hours  vasopressin Infusion 0.04 Unit(s)/Min IV Continuous <Continuous>      PRN Meds:  dextrose Oral Gel 15 Gram(s) Oral once PRN      LABS:                        11.7   4.14  )-----------( 65       ( 15 Sep 2023 03:30 )             34.5     Hgb Trend: 11.7<--, 11.6<--, 11.3<--, 11.2<--, 11.3<--  09-15    131<L>  |  94<L>  |  44<H>  ----------------------------<  359<H>  3.6   |  25  |  0.83    Ca    8.4      15 Sep 2023 03:30  Phos  2.2     09-15  Mg     2.20     09-15    TPro  4.7<L>  /  Alb  3.3  /  TBili  4.8<H>  /  DBili  x   /  AST  589<H>  /  ALT  850<H>  /  AlkPhos  74  09-15    Creatinine Trend: 0.83<--, 0.82<--, 0.86<--, 0.77<--, 0.87<--, 0.95<--  PT/INR - ( 15 Sep 2023 03:30 )   PT: 24.0 sec;   INR: 2.17 ratio         PTT - ( 15 Sep 2023 03:30 )  PTT:57.4 sec  Urinalysis Basic - ( 15 Sep 2023 03:30 )    Color: x / Appearance: x / SG: x / pH: x  Gluc: 359 mg/dL / Ketone: x  / Bili: x / Urobili: x   Blood: x / Protein: x / Nitrite: x   Leuk Esterase: x / RBC: x / WBC x   Sq Epi: x / Non Sq Epi: x / Bacteria: x      Arterial Blood Gas:  09-15 @ 03:30  7.40/48/104/30/98.6/4.1  ABG lactate: --  Arterial Blood Gas:  09-14 @ 23:36  7.40/45/117/28/99.3/2.6  ABG lactate: --  Arterial Blood Gas:  09-14 @ 17:15  7.35/51/114/28/98.6/1.8  ABG lactate: --  Arterial Blood Gas:  09-14 @ 13:05  7.35/45/125/25/99.4/-1.0  ABG lactate: --  Arterial Blood Gas:  09-14 @ 07:04  7.38/41/124/24/100.0/-0.8  ABG lactate: --  Arterial Blood Gas:  09-14 @ 04:00  7.35/41/120/23/99.3/-2.9  ABG lactate: --  Arterial Blood Gas:  09-13 @ 19:55  7.31/46/126/23/99.1/-3.2  ABG lactate: --  Arterial Blood Gas:  09-13 @ 15:44  7.31/43/78/22/96.2/-4.4  ABG lactate: --  Arterial Blood Gas:  09-13 @ 12:07  7.32/39/165/20/99.8/-5.6  ABG lactate: --  Arterial Blood Gas:  09-13 @ 11:20  7.37/37/86/21/98.6/-3.4  ABG lactate: --  Arterial Blood Gas:  09-13 @ 10:46  7.38/40/107/24/99.9/-1.3  ABG lactate: --  Arterial Blood Gas:  09-13 @ 10:29  7.22/38/153/16/99.5/-11.4  ABG lactate: --  Arterial Blood Gas:  09-13 @ 10:05  --/--/--/--/22.2/--  ABG lactate: --  Arterial Blood Gas:  09-13 @ 08:33  7.07/47/63/14/86.6/-16.3  ABG lactate: --  Arterial Blood Gas:  09-13 @ 08:21  7.05/47/74/13/91.2/-17.2  ABG lactate: --  Arterial Blood Gas:  09-13 @ 07:58  7.08/53/46/16/62.4/-14.4  ABG lactate: --  Arterial Blood Gas:  09-13 @ 07:26  7.10/60/57/19/79.4/-11.6  ABG lactate: --    Venous Blood Gas:  09-13 @ 12:46  7.29/49/39/24/60.4  VBG Lactate: --  Venous Blood Gas:  09-13 @ 12:26  7.32/44/51/23/82.9  VBG Lactate: --  Venous Blood Gas:  09-13 @ 10:08  --/--/--/--/22  VBG Lactate: --      MICROBIOLOGY:     Culture - Blood (collected 13 Sep 2023 17:51)  Source: .Blood Blood-Peripheral  Preliminary Report (14 Sep 2023 22:02):    No growth at 24 hours    Culture - Blood (collected 13 Sep 2023 17:44)  Source: .Blood Blood  Preliminary Report (14 Sep 2023 22:02):    No growth at 24 hours    Culture - Urine (collected 13 Sep 2023 17:25)  Source: Catheterized Catheterized  Final Report (14 Sep 2023 15:40):    No growth    Culture - Fungal, Bronchial (collected 13 Sep 2023 15:45)  Source: .Bronchial Bronchial Lavage  Preliminary Report (14 Sep 2023 14:07):    Testing in progress    Culture - Acid Fast - Bronchial w/Smear (collected 13 Sep 2023 15:45)  Source: .Bronchial Bronchial Lavage    Culture - Bronchial (collected 13 Sep 2023 15:45)  Source: .Bronchial Bronchial Lavage  Gram Stain (14 Sep 2023 07:27):    Rare polymorphonuclear leukocytes seen per low power field    No squamous epithelial cells seen per low power field    No organisms seen per oil power field      RADIOLOGY:  [ ] Reviewed and interpreted by me           Interval Events:  -Argatroban held anticipating shiley placement for PLEX, however INR remained elevated, transfused FFP x1 and argatroban restarted w/o placement of shiley  -Lasix 20mg IVP and additional 40mg IVP given this AM  -weaned off Vasopressin overnight  -remains on ECMO circuit fi02 100% sweep 4    HPI:  62 year old female with a past medical history of afib, and sciatica, who presented to [facility] for shortness of breath. She had gone to [facility] where she had CXR which showed bilateral lower lobe infiltrates so was sent to ER. She had been having exertional dyspnea and hypoxemia (on home pulse ox to 82%). She had been having dyspnea for several months and had a workup in [place] with a CT scan (which was negative for PE). She also states that she was seen by a pulmonologist and rheumatology, where they ruled out lupus and other immunological disorders.    [facility] Hospital Course  Found to be hypoxic and have interstitial lung disease appearance on CT, admitted 8/26 for AHRF, further ILD workup and management. TTE 8/26 with normal LVEF. Pt was started on prednisone on admission with gradually improving O2 requirement and has been stable on 2-3LNC since 9/3. Started steroid taper on 9/6 and fully transitioned to PO 9/8 overnight. Started on Mycophenolate mofetil (cellcept) 9/8 evening but pt reported subjective side effects with weakness. Episode of AF w RVR with HR up to 140s on 9/11 am, decreased to HR 10-110s with IV lopressor 10. CTA negative for PE and showed interval improvements in GGO but possible lingular bleb and RML bronchiectasis. Patient received 2L of but before more fluids and beta-blocker pt developed heart palpitations with EKG HR 170s with SVT. BPs 105/70s. Did not respond to vagal maneuvers. Pt given oral lopressor 12.5 and IV lopressor 5, with improvement of HR to 130s. SBP briefly dropped to 60-70s then recovered. Patient on NRB offered HFNC/BiPAP but refused. Started on empiric vancomycin and zosyn. BCx w/ NGTD. Per pulm increased solumedrol to 40mg qd. Patient acceded to HFNC in which she desatted and presented with increased wob for which she was transitioned to BiPAP. Patient with anxiety while on BiPAP presenting tachypnea and desatting to the 80s despite verbal redirection for which she was administered ativan 1mg IVP x1. Patient distress improved with sats in the low 90s but had desaturation to 70s. STAT CXR showed increased pulmonary congestion for which patient was administered lasix without improvement. Intubated and started on mechanical ventilation but required very high PEEP (18) and 100% FiO2 and still had low saturations. VV ECMO team consulted and accepted to [facility] center. Per signout patient had RV enlargement and dysfunction on POCUS with concern for either new PE vs high pulmonary pressures due to PEEP 18 and lung dysfunction. Patient went for cannulation at [facility] with flouro showing no acute PE. Cannulated with 25 F drainage cannula in R Fem V and 23F “arterial” cannula in RIJ.    (13 Sep 2023 15:24)    REVIEW OF SYSTEMS:    [ ] All other systems negative  [x] Unable to assess ROS because patient is intubated and sedated        OBJECTIVE:  ICU Vital Signs Last 24 Hrs  T(C): 35.4 (15 Sep 2023 04:00), Max: 35.6 (14 Sep 2023 19:15)  T(F): 95.8 (15 Sep 2023 04:00), Max: 96.1 (14 Sep 2023 19:15)  HR: 65 (15 Sep 2023 05:00) (51 - 65)  BP: --  BP(mean): --  ABP: 115/67 (15 Sep 2023 05:00) (82/49 - 150/86)  ABP(mean): 87 (15 Sep 2023 05:00) (62 - 111)  RR: 12 (15 Sep 2023 05:00) (0 - 24)  SpO2: 95% (15 Sep 2023 05:00) (92% - 100%)    O2 Parameters below as of 15 Sep 2023 05:00  Patient On (Oxygen Delivery Method): ventilator    O2 Concentration (%): 40      Mode: AC/ CMV (Assist Control/ Continuous Mandatory Ventilation), RR (machine): 12, FiO2: 40, PEEP: 12, ITime: 1, MAP: 15, PC: 12, PIP: 24    09-13 @ 07:01  -  09-14 @ 07:00  --------------------------------------------------------  IN: 2644.3 mL / OUT: 3230 mL / NET: -585.7 mL    09-14 @ 07:01  -  09-15 @ 06:21  --------------------------------------------------------  IN: 3309 mL / OUT: 1445 mL / NET: 1864 mL      CAPILLARY BLOOD GLUCOSE      POCT Blood Glucose.: 307 mg/dL (15 Sep 2023 06:06)      Physical Exam:   Constitutional: no acute distress   HEENT: + PERRLA, EOMI, no drainage or redness  Neck: supple, RIJ ECMO cannula, LIJ TLC  Respiratory: Ventilator assisted breath Sounds equal & clear bilaterally to auscultation, no accessory muscle use noted  Cardiovascular: Sinus fady, regular rate, regular rhythm, normal S1, S2; no murmurs or rub  Gastrointestinal: Soft, non-tender, non distended, no hepatosplenomegaly, normal bowel sounds  Extremities: + 2 peripheral edema, R. Fem ECMO cannula  Vascular: Equal and normal pulses: 2+ peripheral pulses by doppler   Neurological: sedated/intubated  Psychiatric: calm, normal mood, normal affect  Musculoskeletal: No joint swelling or deformity; no limitation of movement  Skin: warm, dry, well perfused, no rashes      HOSPITAL MEDICATIONS:  Standing Meds:  albuterol/ipratropium for Nebulization 3 milliLiter(s) Nebulizer every 6 hours  argatroban Infusion 0.05 MICROgram(s)/kG/Min IV Continuous <Continuous>  azithromycin  IVPB 500 milliGRAM(s) IV Intermittent every 24 hours  azithromycin  IVPB      chlorhexidine 0.12% Liquid 15 milliLiter(s) Oral Mucosa every 12 hours  chlorhexidine 2% Cloths 1 Application(s) Topical <User Schedule>  cisatracurium Injectable 20 milliGRAM(s) IV Push once  dextrose 5%. 1000 milliLiter(s) IV Continuous <Continuous>  dextrose 5%. 1000 milliLiter(s) IV Continuous <Continuous>  dextrose 50% Injectable 25 Gram(s) IV Push once  dextrose 50% Injectable 12.5 Gram(s) IV Push once  dextrose 50% Injectable 25 Gram(s) IV Push once  fentaNYL    Injectable 100 MICROGram(s) IV Push once  glucagon  Injectable 1 milliGRAM(s) IntraMuscular once  HYDROmorphone Infusion. 1 mG/Hr IV Continuous <Continuous>  insulin lispro (ADMELOG) corrective regimen sliding scale   SubCutaneous every 6 hours  insulin NPH human recombinant 9 Unit(s) SubCutaneous every 6 hours  meropenem  IVPB      meropenem  IVPB 1000 milliGRAM(s) IV Intermittent every 8 hours  methylPREDNISolone sodium succinate IVPB 1000 milliGRAM(s) IV Intermittent once  multivitamin  Chewable 1 Tablet(s) Chew daily  naloxegol 25 milliGRAM(s) Oral daily  norepinephrine Infusion 0.3 MICROgram(s)/kG/Min IV Continuous <Continuous>  pantoprazole  Injectable 40 milliGRAM(s) IV Push daily  phenylephrine    Infusion 1.5 MICROgram(s)/kG/Min IV Continuous <Continuous>  polyethylene glycol 3350 17 Gram(s) Oral two times a day  potassium phosphate IVPB 15 milliMole(s) IV Intermittent once  propofol Infusion 15.001 MICROgram(s)/kG/Min IV Continuous <Continuous>  senna Syrup 10 milliLiter(s) Oral two times a day  trimethoprim  40 mG/sulfamethoxazole 200 mG Suspension 160 milliGRAM(s) Oral daily  vancomycin  IVPB 1500 milliGRAM(s) IV Intermittent every 12 hours  vasopressin Infusion 0.04 Unit(s)/Min IV Continuous <Continuous>      PRN Meds:  dextrose Oral Gel 15 Gram(s) Oral once PRN      LABS:                        11.7   4.14  )-----------( 65       ( 15 Sep 2023 03:30 )             34.5     Hgb Trend: 11.7<--, 11.6<--, 11.3<--, 11.2<--, 11.3<--  09-15    131<L>  |  94<L>  |  44<H>  ----------------------------<  359<H>  3.6   |  25  |  0.83    Ca    8.4      15 Sep 2023 03:30  Phos  2.2     09-15  Mg     2.20     09-15    TPro  4.7<L>  /  Alb  3.3  /  TBili  4.8<H>  /  DBili  x   /  AST  589<H>  /  ALT  850<H>  /  AlkPhos  74  09-15    Creatinine Trend: 0.83<--, 0.82<--, 0.86<--, 0.77<--, 0.87<--, 0.95<--  PT/INR - ( 15 Sep 2023 03:30 )   PT: 24.0 sec;   INR: 2.17 ratio         PTT - ( 15 Sep 2023 03:30 )  PTT:57.4 sec  Urinalysis Basic - ( 15 Sep 2023 03:30 )    Color: x / Appearance: x / SG: x / pH: x  Gluc: 359 mg/dL / Ketone: x  / Bili: x / Urobili: x   Blood: x / Protein: x / Nitrite: x   Leuk Esterase: x / RBC: x / WBC x   Sq Epi: x / Non Sq Epi: x / Bacteria: x      Arterial Blood Gas:  09-15 @ 03:30  7.40/48/104/30/98.6/4.1  ABG lactate: --  Arterial Blood Gas:  09-14 @ 23:36  7.40/45/117/28/99.3/2.6  ABG lactate: --  Arterial Blood Gas:  09-14 @ 17:15  7.35/51/114/28/98.6/1.8  ABG lactate: --  Arterial Blood Gas:  09-14 @ 13:05  7.35/45/125/25/99.4/-1.0  ABG lactate: --  Arterial Blood Gas:  09-14 @ 07:04  7.38/41/124/24/100.0/-0.8  ABG lactate: --  Arterial Blood Gas:  09-14 @ 04:00  7.35/41/120/23/99.3/-2.9  ABG lactate: --  Arterial Blood Gas:  09-13 @ 19:55  7.31/46/126/23/99.1/-3.2  ABG lactate: --  Arterial Blood Gas:  09-13 @ 15:44  7.31/43/78/22/96.2/-4.4  ABG lactate: --  Arterial Blood Gas:  09-13 @ 12:07  7.32/39/165/20/99.8/-5.6  ABG lactate: --  Arterial Blood Gas:  09-13 @ 11:20  7.37/37/86/21/98.6/-3.4  ABG lactate: --  Arterial Blood Gas:  09-13 @ 10:46  7.38/40/107/24/99.9/-1.3  ABG lactate: --  Arterial Blood Gas:  09-13 @ 10:29  7.22/38/153/16/99.5/-11.4  ABG lactate: --  Arterial Blood Gas:  09-13 @ 10:05  --/--/--/--/22.2/--  ABG lactate: --  Arterial Blood Gas:  09-13 @ 08:33  7.07/47/63/14/86.6/-16.3  ABG lactate: --  Arterial Blood Gas:  09-13 @ 08:21  7.05/47/74/13/91.2/-17.2  ABG lactate: --  Arterial Blood Gas:  09-13 @ 07:58  7.08/53/46/16/62.4/-14.4  ABG lactate: --  Arterial Blood Gas:  09-13 @ 07:26  7.10/60/57/19/79.4/-11.6  ABG lactate: --    Venous Blood Gas:  09-13 @ 12:46  7.29/49/39/24/60.4  VBG Lactate: --  Venous Blood Gas:  09-13 @ 12:26  7.32/44/51/23/82.9  VBG Lactate: --  Venous Blood Gas:  09-13 @ 10:08  --/--/--/--/22  VBG Lactate: --      MICROBIOLOGY:     Culture - Blood (collected 13 Sep 2023 17:51)  Source: .Blood Blood-Peripheral  Preliminary Report (14 Sep 2023 22:02):    No growth at 24 hours    Culture - Blood (collected 13 Sep 2023 17:44)  Source: .Blood Blood  Preliminary Report (14 Sep 2023 22:02):    No growth at 24 hours    Culture - Urine (collected 13 Sep 2023 17:25)  Source: Catheterized Catheterized  Final Report (14 Sep 2023 15:40):    No growth    Culture - Fungal, Bronchial (collected 13 Sep 2023 15:45)  Source: .Bronchial Bronchial Lavage  Preliminary Report (14 Sep 2023 14:07):    Testing in progress    Culture - Acid Fast - Bronchial w/Smear (collected 13 Sep 2023 15:45)  Source: .Bronchial Bronchial Lavage    Culture - Bronchial (collected 13 Sep 2023 15:45)  Source: .Bronchial Bronchial Lavage  Gram Stain (14 Sep 2023 07:27):    Rare polymorphonuclear leukocytes seen per low power field    No squamous epithelial cells seen per low power field    No organisms seen per oil power field      RADIOLOGY:  [ ] Reviewed and interpreted by me

## 2023-09-16 DIAGNOSIS — R57.0 CARDIOGENIC SHOCK: ICD-10-CM

## 2023-09-16 DIAGNOSIS — J84.89 OTHER SPECIFIED INTERSTITIAL PULMONARY DISEASES: ICD-10-CM

## 2023-09-16 DIAGNOSIS — J96.01 ACUTE RESPIRATORY FAILURE WITH HYPOXIA: ICD-10-CM

## 2023-09-16 DIAGNOSIS — I31.39 OTHER PERICARDIAL EFFUSION (NONINFLAMMATORY): ICD-10-CM

## 2023-09-16 DIAGNOSIS — J81.0 ACUTE PULMONARY EDEMA: ICD-10-CM

## 2023-09-16 DIAGNOSIS — J18.9 PNEUMONIA, UNSPECIFIED ORGANISM: ICD-10-CM

## 2023-09-16 DIAGNOSIS — E87.1 HYPO-OSMOLALITY AND HYPONATREMIA: ICD-10-CM

## 2023-09-16 DIAGNOSIS — M35.1 OTHER OVERLAP SYNDROMES: ICD-10-CM

## 2023-09-16 DIAGNOSIS — I48.20 CHRONIC ATRIAL FIBRILLATION, UNSPECIFIED: ICD-10-CM

## 2023-09-16 DIAGNOSIS — E66.01 MORBID (SEVERE) OBESITY DUE TO EXCESS CALORIES: ICD-10-CM

## 2023-09-16 DIAGNOSIS — I24.8 OTHER FORMS OF ACUTE ISCHEMIC HEART DISEASE: ICD-10-CM

## 2023-09-16 LAB
ALBUMIN SERPL ELPH-MCNC: 2.9 G/DL — LOW (ref 3.3–5)
ALBUMIN SERPL ELPH-MCNC: 3.1 G/DL — LOW (ref 3.3–5)
ALBUMIN SERPL ELPH-MCNC: 3.1 G/DL — LOW (ref 3.3–5)
ALBUMIN SERPL ELPH-MCNC: 3.2 G/DL — LOW (ref 3.3–5)
ALP SERPL-CCNC: 67 U/L — SIGNIFICANT CHANGE UP (ref 40–120)
ALP SERPL-CCNC: 74 U/L — SIGNIFICANT CHANGE UP (ref 40–120)
ALP SERPL-CCNC: 76 U/L — SIGNIFICANT CHANGE UP (ref 40–120)
ALP SERPL-CCNC: 79 U/L — SIGNIFICANT CHANGE UP (ref 40–120)
ALT FLD-CCNC: 1030 U/L — HIGH (ref 4–33)
ALT FLD-CCNC: 970 U/L — HIGH (ref 4–33)
ALT FLD-CCNC: 992 U/L — HIGH (ref 4–33)
ALT FLD-CCNC: 993 U/L — HIGH (ref 4–33)
ANION GAP SERPL CALC-SCNC: 10 MMOL/L — SIGNIFICANT CHANGE UP (ref 7–14)
ANION GAP SERPL CALC-SCNC: 11 MMOL/L — SIGNIFICANT CHANGE UP (ref 7–14)
ANION GAP SERPL CALC-SCNC: 11 MMOL/L — SIGNIFICANT CHANGE UP (ref 7–14)
ANION GAP SERPL CALC-SCNC: 9 MMOL/L — SIGNIFICANT CHANGE UP (ref 7–14)
APTT BLD: 55.2 SEC — HIGH (ref 24.5–35.6)
APTT BLD: 59.9 SEC — HIGH (ref 24.5–35.6)
APTT BLD: 68.6 SEC — HIGH (ref 24.5–35.6)
APTT BLD: 71.8 SEC — HIGH (ref 24.5–35.6)
APTT BLD: 71.8 SEC — HIGH (ref 24.5–35.6)
APTT BLD: 89.9 SEC — HIGH (ref 24.5–35.6)
AST SERPL-CCNC: 422 U/L — HIGH (ref 4–32)
AST SERPL-CCNC: 431 U/L — HIGH (ref 4–32)
AST SERPL-CCNC: 508 U/L — HIGH (ref 4–32)
AST SERPL-CCNC: 525 U/L — HIGH (ref 4–32)
BASOPHILS # BLD AUTO: 0 K/UL — SIGNIFICANT CHANGE UP (ref 0–0.2)
BASOPHILS NFR BLD AUTO: 0 % — SIGNIFICANT CHANGE UP (ref 0–2)
BILIRUB SERPL-MCNC: 4.5 MG/DL — HIGH (ref 0.2–1.2)
BILIRUB SERPL-MCNC: 5 MG/DL — HIGH (ref 0.2–1.2)
BILIRUB SERPL-MCNC: 5.3 MG/DL — HIGH (ref 0.2–1.2)
BILIRUB SERPL-MCNC: 6.2 MG/DL — HIGH (ref 0.2–1.2)
BLD GP AB SCN SERPL QL: NEGATIVE — SIGNIFICANT CHANGE UP
BLOOD GAS ARTERIAL COMPREHENSIVE RESULT: SIGNIFICANT CHANGE UP
BLOOD GAS ECMO POST MEMBRANE - ARTERIAL RESULT: SIGNIFICANT CHANGE UP
BLOOD GAS ECMO PRE MEMBRANE - VENOUS RESULT: SIGNIFICANT CHANGE UP
BUN SERPL-MCNC: 48 MG/DL — HIGH (ref 7–23)
BUN SERPL-MCNC: 51 MG/DL — HIGH (ref 7–23)
BUN SERPL-MCNC: 53 MG/DL — HIGH (ref 7–23)
BUN SERPL-MCNC: 56 MG/DL — HIGH (ref 7–23)
CALCIUM SERPL-MCNC: 8.5 MG/DL — SIGNIFICANT CHANGE UP (ref 8.4–10.5)
CALCIUM SERPL-MCNC: 8.5 MG/DL — SIGNIFICANT CHANGE UP (ref 8.4–10.5)
CALCIUM SERPL-MCNC: 8.6 MG/DL — SIGNIFICANT CHANGE UP (ref 8.4–10.5)
CALCIUM SERPL-MCNC: 8.7 MG/DL — SIGNIFICANT CHANGE UP (ref 8.4–10.5)
CHLORIDE SERPL-SCNC: 102 MMOL/L — SIGNIFICANT CHANGE UP (ref 98–107)
CHLORIDE SERPL-SCNC: 103 MMOL/L — SIGNIFICANT CHANGE UP (ref 98–107)
CHLORIDE SERPL-SCNC: 103 MMOL/L — SIGNIFICANT CHANGE UP (ref 98–107)
CHLORIDE SERPL-SCNC: 105 MMOL/L — SIGNIFICANT CHANGE UP (ref 98–107)
CO2 SERPL-SCNC: 31 MMOL/L — SIGNIFICANT CHANGE UP (ref 22–31)
CO2 SERPL-SCNC: 32 MMOL/L — HIGH (ref 22–31)
CO2 SERPL-SCNC: 32 MMOL/L — HIGH (ref 22–31)
CO2 SERPL-SCNC: 34 MMOL/L — HIGH (ref 22–31)
CREAT SERPL-MCNC: 0.85 MG/DL — SIGNIFICANT CHANGE UP (ref 0.5–1.3)
CREAT SERPL-MCNC: 0.9 MG/DL — SIGNIFICANT CHANGE UP (ref 0.5–1.3)
CREAT SERPL-MCNC: 0.91 MG/DL — SIGNIFICANT CHANGE UP (ref 0.5–1.3)
CREAT SERPL-MCNC: 0.95 MG/DL — SIGNIFICANT CHANGE UP (ref 0.5–1.3)
CRP SERPL-MCNC: 105.7 MG/L — HIGH
CULTURE RESULTS: NO GROWTH — SIGNIFICANT CHANGE UP
EGFR: 67 ML/MIN/1.73M2 — SIGNIFICANT CHANGE UP
EGFR: 70 ML/MIN/1.73M2 — SIGNIFICANT CHANGE UP
EGFR: 71 ML/MIN/1.73M2 — SIGNIFICANT CHANGE UP
EGFR: 76 ML/MIN/1.73M2 — SIGNIFICANT CHANGE UP
EOSINOPHIL # BLD AUTO: 0 K/UL — SIGNIFICANT CHANGE UP (ref 0–0.5)
EOSINOPHIL NFR BLD AUTO: 0 % — SIGNIFICANT CHANGE UP (ref 0–6)
ERYTHROCYTE [SEDIMENTATION RATE] IN BLOOD: 10 MM/HR — SIGNIFICANT CHANGE UP (ref 4–25)
GAS PNL BLDA: SIGNIFICANT CHANGE UP
GLUCOSE BLDC GLUCOMTR-MCNC: 104 MG/DL — HIGH (ref 70–99)
GLUCOSE BLDC GLUCOMTR-MCNC: 111 MG/DL — HIGH (ref 70–99)
GLUCOSE BLDC GLUCOMTR-MCNC: 113 MG/DL — HIGH (ref 70–99)
GLUCOSE BLDC GLUCOMTR-MCNC: 114 MG/DL — HIGH (ref 70–99)
GLUCOSE BLDC GLUCOMTR-MCNC: 117 MG/DL — HIGH (ref 70–99)
GLUCOSE BLDC GLUCOMTR-MCNC: 122 MG/DL — HIGH (ref 70–99)
GLUCOSE BLDC GLUCOMTR-MCNC: 126 MG/DL — HIGH (ref 70–99)
GLUCOSE BLDC GLUCOMTR-MCNC: 132 MG/DL — HIGH (ref 70–99)
GLUCOSE BLDC GLUCOMTR-MCNC: 135 MG/DL — HIGH (ref 70–99)
GLUCOSE BLDC GLUCOMTR-MCNC: 135 MG/DL — HIGH (ref 70–99)
GLUCOSE BLDC GLUCOMTR-MCNC: 140 MG/DL — HIGH (ref 70–99)
GLUCOSE BLDC GLUCOMTR-MCNC: 140 MG/DL — HIGH (ref 70–99)
GLUCOSE BLDC GLUCOMTR-MCNC: 142 MG/DL — HIGH (ref 70–99)
GLUCOSE BLDC GLUCOMTR-MCNC: 144 MG/DL — HIGH (ref 70–99)
GLUCOSE BLDC GLUCOMTR-MCNC: 145 MG/DL — HIGH (ref 70–99)
GLUCOSE BLDC GLUCOMTR-MCNC: 148 MG/DL — HIGH (ref 70–99)
GLUCOSE BLDC GLUCOMTR-MCNC: 148 MG/DL — HIGH (ref 70–99)
GLUCOSE BLDC GLUCOMTR-MCNC: 150 MG/DL — HIGH (ref 70–99)
GLUCOSE BLDC GLUCOMTR-MCNC: 155 MG/DL — HIGH (ref 70–99)
GLUCOSE BLDC GLUCOMTR-MCNC: 156 MG/DL — HIGH (ref 70–99)
GLUCOSE BLDC GLUCOMTR-MCNC: 162 MG/DL — HIGH (ref 70–99)
GLUCOSE BLDC GLUCOMTR-MCNC: 171 MG/DL — HIGH (ref 70–99)
GLUCOSE BLDC GLUCOMTR-MCNC: 174 MG/DL — HIGH (ref 70–99)
GLUCOSE BLDC GLUCOMTR-MCNC: 181 MG/DL — HIGH (ref 70–99)
GLUCOSE BLDC GLUCOMTR-MCNC: 181 MG/DL — HIGH (ref 70–99)
GLUCOSE BLDC GLUCOMTR-MCNC: 186 MG/DL — HIGH (ref 70–99)
GLUCOSE SERPL-MCNC: 134 MG/DL — HIGH (ref 70–99)
GLUCOSE SERPL-MCNC: 148 MG/DL — HIGH (ref 70–99)
GLUCOSE SERPL-MCNC: 176 MG/DL — HIGH (ref 70–99)
GLUCOSE SERPL-MCNC: 206 MG/DL — HIGH (ref 70–99)
GRAM STN FLD: SIGNIFICANT CHANGE UP
HAPTOGLOB SERPL-MCNC: <20 MG/DL — LOW (ref 34–200)
HAPTOGLOB SERPL-MCNC: <20 MG/DL — LOW (ref 34–200)
HCT VFR BLD CALC: 37 % — SIGNIFICANT CHANGE UP (ref 34.5–45)
HCT VFR BLD CALC: 37.5 % — SIGNIFICANT CHANGE UP (ref 34.5–45)
HCT VFR BLD CALC: 37.5 % — SIGNIFICANT CHANGE UP (ref 34.5–45)
HCT VFR BLD CALC: 37.6 % — SIGNIFICANT CHANGE UP (ref 34.5–45)
HGB BLD-MCNC: 12.1 G/DL — SIGNIFICANT CHANGE UP (ref 11.5–15.5)
HGB BLD-MCNC: 12.3 G/DL — SIGNIFICANT CHANGE UP (ref 11.5–15.5)
IANC: 0.22 K/UL — LOW (ref 1.8–7.4)
IANC: 0.33 K/UL — LOW (ref 1.8–7.4)
IANC: 0.61 K/UL — LOW (ref 1.8–7.4)
IANC: 0.96 K/UL — LOW (ref 1.8–7.4)
IMM GRANULOCYTES NFR BLD AUTO: 0 % — SIGNIFICANT CHANGE UP (ref 0–0.9)
IMM GRANULOCYTES NFR BLD AUTO: 1.6 % — HIGH (ref 0–0.9)
IMM GRANULOCYTES NFR BLD AUTO: 2 % — HIGH (ref 0–0.9)
INR BLD: 2.22 RATIO — HIGH (ref 0.85–1.18)
INR BLD: 2.59 RATIO — HIGH (ref 0.85–1.18)
INR BLD: 2.65 RATIO — HIGH (ref 0.85–1.18)
INR BLD: 2.83 RATIO — HIGH (ref 0.85–1.18)
LDH SERPL L TO P-CCNC: 681 U/L — HIGH (ref 135–225)
LDH SERPL L TO P-CCNC: 775 U/L — HIGH (ref 135–225)
LYMPHOCYTES # BLD AUTO: 0.09 K/UL — LOW (ref 1–3.3)
LYMPHOCYTES # BLD AUTO: 0.12 K/UL — LOW (ref 1–3.3)
LYMPHOCYTES # BLD AUTO: 18 % — SIGNIFICANT CHANGE UP (ref 13–44)
LYMPHOCYTES # BLD AUTO: 21.4 % — SIGNIFICANT CHANGE UP (ref 13–44)
LYMPHOCYTES # BLD AUTO: 9.6 % — LOW (ref 13–44)
LYMPHOCYTES # BLD AUTO: 9.6 % — LOW (ref 13–44)
MAGNESIUM SERPL-MCNC: 2.4 MG/DL — SIGNIFICANT CHANGE UP (ref 1.6–2.6)
MAGNESIUM SERPL-MCNC: 2.6 MG/DL — SIGNIFICANT CHANGE UP (ref 1.6–2.6)
MAGNESIUM SERPL-MCNC: 2.6 MG/DL — SIGNIFICANT CHANGE UP (ref 1.6–2.6)
MAGNESIUM SERPL-MCNC: 2.7 MG/DL — HIGH (ref 1.6–2.6)
MCHC RBC-ENTMCNC: 30.3 PG — SIGNIFICANT CHANGE UP (ref 27–34)
MCHC RBC-ENTMCNC: 30.4 PG — SIGNIFICANT CHANGE UP (ref 27–34)
MCHC RBC-ENTMCNC: 30.6 PG — SIGNIFICANT CHANGE UP (ref 27–34)
MCHC RBC-ENTMCNC: 30.8 PG — SIGNIFICANT CHANGE UP (ref 27–34)
MCHC RBC-ENTMCNC: 32.2 GM/DL — SIGNIFICANT CHANGE UP (ref 32–36)
MCHC RBC-ENTMCNC: 32.3 GM/DL — SIGNIFICANT CHANGE UP (ref 32–36)
MCHC RBC-ENTMCNC: 32.7 GM/DL — SIGNIFICANT CHANGE UP (ref 32–36)
MCHC RBC-ENTMCNC: 32.8 GM/DL — SIGNIFICANT CHANGE UP (ref 32–36)
MCV RBC AUTO: 92.8 FL — SIGNIFICANT CHANGE UP (ref 80–100)
MCV RBC AUTO: 93.4 FL — SIGNIFICANT CHANGE UP (ref 80–100)
MCV RBC AUTO: 94.2 FL — SIGNIFICANT CHANGE UP (ref 80–100)
MCV RBC AUTO: 95.4 FL — SIGNIFICANT CHANGE UP (ref 80–100)
MONOCYTES # BLD AUTO: 0.05 K/UL — SIGNIFICANT CHANGE UP (ref 0–0.9)
MONOCYTES # BLD AUTO: 0.07 K/UL — SIGNIFICANT CHANGE UP (ref 0–0.9)
MONOCYTES # BLD AUTO: 0.11 K/UL — SIGNIFICANT CHANGE UP (ref 0–0.9)
MONOCYTES # BLD AUTO: 0.15 K/UL — SIGNIFICANT CHANGE UP (ref 0–0.9)
MONOCYTES NFR BLD AUTO: 12 % — SIGNIFICANT CHANGE UP (ref 2–14)
MONOCYTES NFR BLD AUTO: 14 % — SIGNIFICANT CHANGE UP (ref 2–14)
MONOCYTES NFR BLD AUTO: 26.2 % — HIGH (ref 2–14)
MONOCYTES NFR BLD AUTO: 5.3 % — SIGNIFICANT CHANGE UP (ref 2–14)
NEUTROPHILS # BLD AUTO: 0.22 K/UL — LOW (ref 1.8–7.4)
NEUTROPHILS # BLD AUTO: 0.33 K/UL — LOW (ref 1.8–7.4)
NEUTROPHILS # BLD AUTO: 0.75 K/UL — LOW (ref 1.8–7.4)
NEUTROPHILS # BLD AUTO: 0.96 K/UL — LOW (ref 1.8–7.4)
NEUTROPHILS NFR BLD AUTO: 52.4 % — SIGNIFICANT CHANGE UP (ref 43–77)
NEUTROPHILS NFR BLD AUTO: 66 % — SIGNIFICANT CHANGE UP (ref 43–77)
NEUTROPHILS NFR BLD AUTO: 75.4 % — SIGNIFICANT CHANGE UP (ref 43–77)
NEUTROPHILS NFR BLD AUTO: 76.8 % — SIGNIFICANT CHANGE UP (ref 43–77)
NRBC # BLD: 10 /100 WBCS — HIGH (ref 0–0)
NRBC # BLD: 22 /100 WBCS — HIGH (ref 0–0)
NRBC # BLD: 31 /100 WBCS — HIGH (ref 0–0)
NRBC # FLD: 0.11 K/UL — HIGH (ref 0–0)
NRBC # FLD: 0.12 K/UL — HIGH (ref 0–0)
NRBC # FLD: 0.13 K/UL — HIGH (ref 0–0)
PHOSPHATE SERPL-MCNC: 2.9 MG/DL — SIGNIFICANT CHANGE UP (ref 2.5–4.5)
PHOSPHATE SERPL-MCNC: 3.1 MG/DL — SIGNIFICANT CHANGE UP (ref 2.5–4.5)
PHOSPHATE SERPL-MCNC: 3.3 MG/DL — SIGNIFICANT CHANGE UP (ref 2.5–4.5)
PHOSPHATE SERPL-MCNC: 3.5 MG/DL — SIGNIFICANT CHANGE UP (ref 2.5–4.5)
PLATELET # BLD AUTO: 44 K/UL — LOW (ref 150–400)
PLATELET # BLD AUTO: 47 K/UL — LOW (ref 150–400)
PLATELET # BLD AUTO: 48 K/UL — LOW (ref 150–400)
PLATELET # BLD AUTO: 56 K/UL — LOW (ref 150–400)
POTASSIUM SERPL-MCNC: 3.9 MMOL/L — SIGNIFICANT CHANGE UP (ref 3.5–5.3)
POTASSIUM SERPL-MCNC: 4.1 MMOL/L — SIGNIFICANT CHANGE UP (ref 3.5–5.3)
POTASSIUM SERPL-SCNC: 3.9 MMOL/L — SIGNIFICANT CHANGE UP (ref 3.5–5.3)
POTASSIUM SERPL-SCNC: 4.1 MMOL/L — SIGNIFICANT CHANGE UP (ref 3.5–5.3)
PROT SERPL-MCNC: 4.8 G/DL — LOW (ref 6–8.3)
PROT SERPL-MCNC: 4.8 G/DL — LOW (ref 6–8.3)
PROT SERPL-MCNC: 5.1 G/DL — LOW (ref 6–8.3)
PROT SERPL-MCNC: 5.3 G/DL — LOW (ref 6–8.3)
PROTHROM AB SERPL-ACNC: 24.3 SEC — HIGH (ref 9.5–13)
PROTHROM AB SERPL-ACNC: 28.2 SEC — HIGH (ref 9.5–13)
PROTHROM AB SERPL-ACNC: 28.9 SEC — HIGH (ref 9.5–13)
PROTHROM AB SERPL-ACNC: 30.8 SEC — HIGH (ref 9.5–13)
RBC # BLD: 3.93 M/UL — SIGNIFICANT CHANGE UP (ref 3.8–5.2)
RBC # BLD: 3.96 M/UL — SIGNIFICANT CHANGE UP (ref 3.8–5.2)
RBC # BLD: 3.99 M/UL — SIGNIFICANT CHANGE UP (ref 3.8–5.2)
RBC # BLD: 4.04 M/UL — SIGNIFICANT CHANGE UP (ref 3.8–5.2)
RBC # FLD: 15 % — HIGH (ref 10.3–14.5)
RBC # FLD: 15.3 % — HIGH (ref 10.3–14.5)
RBC # FLD: 15.4 % — HIGH (ref 10.3–14.5)
RBC # FLD: 15.6 % — HIGH (ref 10.3–14.5)
RH IG SCN BLD-IMP: POSITIVE — SIGNIFICANT CHANGE UP
SODIUM SERPL-SCNC: 143 MMOL/L — SIGNIFICANT CHANGE UP (ref 135–145)
SODIUM SERPL-SCNC: 146 MMOL/L — HIGH (ref 135–145)
SODIUM SERPL-SCNC: 146 MMOL/L — HIGH (ref 135–145)
SODIUM SERPL-SCNC: 148 MMOL/L — HIGH (ref 135–145)
SPECIMEN SOURCE: SIGNIFICANT CHANGE UP
SPECIMEN SOURCE: SIGNIFICANT CHANGE UP
TRIGL SERPL-MCNC: 271 MG/DL — HIGH
WBC # BLD: 0.42 K/UL — CRITICAL LOW (ref 3.8–10.5)
WBC # BLD: 0.5 K/UL — CRITICAL LOW (ref 3.8–10.5)
WBC # BLD: 0.98 K/UL — CRITICAL LOW (ref 3.8–10.5)
WBC # BLD: 1.25 K/UL — LOW (ref 3.8–10.5)
WBC # FLD AUTO: 0.42 K/UL — CRITICAL LOW (ref 3.8–10.5)
WBC # FLD AUTO: 0.5 K/UL — CRITICAL LOW (ref 3.8–10.5)
WBC # FLD AUTO: 0.98 K/UL — CRITICAL LOW (ref 3.8–10.5)
WBC # FLD AUTO: 1.25 K/UL — LOW (ref 3.8–10.5)

## 2023-09-16 PROCEDURE — 71045 X-RAY EXAM CHEST 1 VIEW: CPT | Mod: 26

## 2023-09-16 PROCEDURE — 99223 1ST HOSP IP/OBS HIGH 75: CPT | Mod: GC

## 2023-09-16 PROCEDURE — 93308 TTE F-UP OR LMTD: CPT | Mod: 26

## 2023-09-16 PROCEDURE — 76604 US EXAM CHEST: CPT | Mod: 26

## 2023-09-16 PROCEDURE — 99223 1ST HOSP IP/OBS HIGH 75: CPT

## 2023-09-16 PROCEDURE — 99292 CRITICAL CARE ADDL 30 MIN: CPT | Mod: 25

## 2023-09-16 PROCEDURE — 33948 ECMO/ECLS DAILY MGMT-VENOUS: CPT

## 2023-09-16 PROCEDURE — 99291 CRITICAL CARE FIRST HOUR: CPT | Mod: 25

## 2023-09-16 RX ORDER — DIPHENHYDRAMINE HCL 50 MG
50 CAPSULE ORAL ONCE
Refills: 0 | Status: COMPLETED | OUTPATIENT
Start: 2023-09-16 | End: 2023-09-16

## 2023-09-16 RX ORDER — RITUXIMAB 10 MG/ML
1000 INJECTION, SOLUTION INTRAVENOUS ONCE
Refills: 0 | Status: COMPLETED | OUTPATIENT
Start: 2023-09-16 | End: 2023-09-16

## 2023-09-16 RX ORDER — DIPHENHYDRAMINE HCL 50 MG
50 CAPSULE ORAL ONCE
Refills: 0 | Status: DISCONTINUED | OUTPATIENT
Start: 2023-09-16 | End: 2023-09-22

## 2023-09-16 RX ORDER — ACETAMINOPHEN 500 MG
650 TABLET ORAL ONCE
Refills: 0 | Status: COMPLETED | OUTPATIENT
Start: 2023-09-16 | End: 2023-09-16

## 2023-09-16 RX ORDER — MEPERIDINE HYDROCHLORIDE 50 MG/ML
25 INJECTION INTRAMUSCULAR; INTRAVENOUS; SUBCUTANEOUS ONCE
Refills: 0 | Status: DISCONTINUED | OUTPATIENT
Start: 2023-09-16 | End: 2023-09-22

## 2023-09-16 RX ORDER — HYDROMORPHONE HYDROCHLORIDE 2 MG/ML
1 INJECTION INTRAMUSCULAR; INTRAVENOUS; SUBCUTANEOUS
Qty: 100 | Refills: 0 | Status: DISCONTINUED | OUTPATIENT
Start: 2023-09-16 | End: 2023-09-18

## 2023-09-16 RX ORDER — METOCLOPRAMIDE HCL 10 MG
5 TABLET ORAL EVERY 8 HOURS
Refills: 0 | Status: COMPLETED | OUTPATIENT
Start: 2023-09-16 | End: 2023-09-17

## 2023-09-16 RX ORDER — ATOVAQUONE 750 MG/5ML
1500 SUSPENSION ORAL DAILY
Refills: 0 | Status: DISCONTINUED | OUTPATIENT
Start: 2023-09-17 | End: 2023-10-05

## 2023-09-16 RX ORDER — PROPOFOL 10 MG/ML
50 INJECTION, EMULSION INTRAVENOUS
Qty: 1000 | Refills: 0 | Status: DISCONTINUED | OUTPATIENT
Start: 2023-09-16 | End: 2023-09-18

## 2023-09-16 RX ORDER — POTASSIUM CHLORIDE 20 MEQ
40 PACKET (EA) ORAL ONCE
Refills: 0 | Status: DISCONTINUED | OUTPATIENT
Start: 2023-09-16 | End: 2023-09-16

## 2023-09-16 RX ORDER — MULTIVIT-MIN/FERROUS GLUCONATE 9 MG/15 ML
15 LIQUID (ML) ORAL DAILY
Refills: 0 | Status: DISCONTINUED | OUTPATIENT
Start: 2023-09-16 | End: 2023-10-05

## 2023-09-16 RX ADMIN — Medication 3 MILLILITER(S): at 03:50

## 2023-09-16 RX ADMIN — POLYETHYLENE GLYCOL 3350 17 GRAM(S): 17 POWDER, FOR SOLUTION ORAL at 05:06

## 2023-09-16 RX ADMIN — CHLORHEXIDINE GLUCONATE 1 APPLICATION(S): 213 SOLUTION TOPICAL at 05:07

## 2023-09-16 RX ADMIN — HYDROMORPHONE HYDROCHLORIDE 1.5 MG/HR: 2 INJECTION INTRAMUSCULAR; INTRAVENOUS; SUBCUTANEOUS at 08:33

## 2023-09-16 RX ADMIN — ARGATROBAN 2.3 MICROGRAM(S)/KG/MIN: 50 INJECTION, SOLUTION INTRAVENOUS at 08:34

## 2023-09-16 RX ADMIN — Medication 650 MILLIGRAM(S): at 11:28

## 2023-09-16 RX ADMIN — MEROPENEM 100 MILLIGRAM(S): 1 INJECTION INTRAVENOUS at 05:06

## 2023-09-16 RX ADMIN — CHLORHEXIDINE GLUCONATE 15 MILLILITER(S): 213 SOLUTION TOPICAL at 05:06

## 2023-09-16 RX ADMIN — NALOXEGOL OXALATE 25 MILLIGRAM(S): 12.5 TABLET, FILM COATED ORAL at 10:13

## 2023-09-16 RX ADMIN — HYDROMORPHONE HYDROCHLORIDE 1 MG/HR: 2 INJECTION INTRAMUSCULAR; INTRAVENOUS; SUBCUTANEOUS at 20:01

## 2023-09-16 RX ADMIN — Medication 160 MILLIGRAM(S): at 12:14

## 2023-09-16 RX ADMIN — Medication 3 MILLILITER(S): at 10:06

## 2023-09-16 RX ADMIN — PROPOFOL 38.3 MICROGRAM(S)/KG/MIN: 10 INJECTION, EMULSION INTRAVENOUS at 08:33

## 2023-09-16 RX ADMIN — CHLORHEXIDINE GLUCONATE 15 MILLILITER(S): 213 SOLUTION TOPICAL at 18:14

## 2023-09-16 RX ADMIN — Medication 15 MILLILITER(S): at 12:58

## 2023-09-16 RX ADMIN — INSULIN HUMAN 3 UNIT(S)/HR: 100 INJECTION, SOLUTION SUBCUTANEOUS at 20:03

## 2023-09-16 RX ADMIN — Medication 3 MILLILITER(S): at 15:50

## 2023-09-16 RX ADMIN — Medication 36.9 MICROGRAM(S)/KG/MIN: at 20:02

## 2023-09-16 RX ADMIN — PROPOFOL 38.3 MICROGRAM(S)/KG/MIN: 10 INJECTION, EMULSION INTRAVENOUS at 20:01

## 2023-09-16 RX ADMIN — POLYETHYLENE GLYCOL 3350 17 GRAM(S): 17 POWDER, FOR SOLUTION ORAL at 13:19

## 2023-09-16 RX ADMIN — Medication 125 MILLIGRAM(S): at 10:28

## 2023-09-16 RX ADMIN — Medication 50 MILLIGRAM(S): at 10:28

## 2023-09-16 RX ADMIN — Medication 3 MILLILITER(S): at 22:22

## 2023-09-16 RX ADMIN — INSULIN HUMAN 3 UNIT(S)/HR: 100 INJECTION, SOLUTION SUBCUTANEOUS at 08:33

## 2023-09-16 RX ADMIN — Medication 1 DROP(S): at 18:39

## 2023-09-16 RX ADMIN — Medication 650 MILLIGRAM(S): at 10:27

## 2023-09-16 RX ADMIN — MEROPENEM 100 MILLIGRAM(S): 1 INJECTION INTRAVENOUS at 22:17

## 2023-09-16 RX ADMIN — Medication 5 MILLIGRAM(S): at 22:22

## 2023-09-16 RX ADMIN — PANTOPRAZOLE SODIUM 40 MILLIGRAM(S): 20 TABLET, DELAYED RELEASE ORAL at 12:14

## 2023-09-16 RX ADMIN — ARGATROBAN 1.53 MICROGRAM(S)/KG/MIN: 50 INJECTION, SOLUTION INTRAVENOUS at 20:02

## 2023-09-16 RX ADMIN — PROPOFOL 38.3 MICROGRAM(S)/KG/MIN: 10 INJECTION, EMULSION INTRAVENOUS at 12:14

## 2023-09-16 RX ADMIN — SENNA PLUS 10 MILLILITER(S): 8.6 TABLET ORAL at 05:06

## 2023-09-16 RX ADMIN — RITUXIMAB 1000 MILLIGRAM(S): 10 INJECTION, SOLUTION INTRAVENOUS at 11:21

## 2023-09-16 RX ADMIN — Medication 36.9 MICROGRAM(S)/KG/MIN: at 08:33

## 2023-09-16 RX ADMIN — Medication 5 MILLIGRAM(S): at 13:20

## 2023-09-16 RX ADMIN — MEROPENEM 100 MILLIGRAM(S): 1 INJECTION INTRAVENOUS at 13:19

## 2023-09-16 RX ADMIN — POLYETHYLENE GLYCOL 3350 17 GRAM(S): 17 POWDER, FOR SOLUTION ORAL at 22:23

## 2023-09-16 RX ADMIN — SENNA PLUS 10 MILLILITER(S): 8.6 TABLET ORAL at 18:14

## 2023-09-16 NOTE — CONSULT NOTE ADULT - SUBJECTIVE AND OBJECTIVE BOX
Patient is a 62y old  Female who presents with a chief complaint of VV ECMO (16 Sep 2023 17:59)      HPI: Refer to MICU note for details. 62 year old presented with SOB to [facility] and is a transfer for further management mainly ECMO.   Worsening B/l lung infiltrates, negative for ID work up. A few months ago work up in [place] neg for lupus and other rheum markers. She is currently intubated and also on ECMO.   Partner by bedside. During bronchoscopy blood was noted in return concerning for DAH. BB was consulted for PLEX trial.    MEDICATIONS  (STANDING): REVIEWED no dosing adjustment needed with PLEX                 12.1   0.50  )-----------( 47       ( 16 Sep 2023 15:30 )             37.5       Hematocrit: 37.5 % (09-16 @ 15:30)      09-16    146<H>  |  103  |  56<H>  ----------------------------<  206<H>  4.1   |  32<H>  |  0.90    Ca    8.5      16 Sep 2023 15:30  Phos  3.5     09-16  Mg     2.60     09-16    TPro  4.8<L>  /  Alb  2.9<L>  /  TBili  5.3<H>  /  DBili  x   /  AST  431<H>  /  ALT  1030<H>  /  AlkPhos  74  09-16      Bilirubin InDirect: 2.5 mg/dL (09-14 @ 04:00)  Lactate Dehydrogenase, Serum: 775 U/L (09-16 @ 03:20)  Haptoglobin, Serum: <20 mg/dL (09-16 @ 03:20)  Ferritin: 3752 ng/mL (09-14 @ 13:31)  PT/INR - ( 16 Sep 2023 15:30 )   PT: 30.8 sec;   INR: 2.83 ratio    PTT - ( 16 Sep 2023 15:30 )  PTT:71.8 sec

## 2023-09-16 NOTE — CONSULT NOTE ADULT - ASSESSMENT
63 yo F w/ Afib initially presented to urgent care for SOB where she had an XR done concerning for b/l lower infiltrates, sent to [facility] ED and  admitted to [facility] on 8/26 after being found to be hypoxemic, reported having  debilitating b/l hand numbness/tingling and some muscles weakness several months. Found to be hypoxic and have interstitial lung disease appearance on CT, admitted 8/26 for AHRF, further ILD workup and management,  started on prednisone on admission with gradually improving O2 requirement and has been stable on 2-3LNC since 9/3, underwent bronchoscopy with TBBX on 8/30 which showed Non-Specific Intersitial Disease (NSIP)/OP. Labs notable for positive ARIANA 1:1280, RNP > 8, Alejandro > 8. Patient continued on steroids and recieved cellcept, which was discontinued 2/2 Afib-RVR. Interval CTA chest on 9/11 also negative PE did show possible lingula pneumonia.  Started on empiric vancomycin and zosyn 9/12, course was then c/b episode of SVT to 170s w/ rapidly progressive hypoxemic respiratory failure, placed NRB offered HFNC/BiPAP but refused, leading to worsening hypoxemic respiratory failure requiring intubation  9/13. Despite best practices, patient remained hypoxemic and patient was cannulated for VV-ECMO on 9/13 and transferred to [facility]. Oncology consulted for leukopenia    #leukopenia, thrombocytopenia  - new acute leukopenia of unclear etiology, possibly in the setting of autoimmune dz vs medication induced (meropenem? bactrim? 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) w/ plasmapheresis (9/15- ) and rituximab (9/16x1)  - thrombocytopenia likely in the setting of shock liver w/ pre-existing fatty liver and on ECMO  - would give filgrastim 480 daily until ANC >1500 in the setting of possible infxn  - acute hepatitis panel negative, send HIV  - send B12, folate, TSH  - consider consult hepatology  - peripheral blood smear reviewed: large platelets noted, no platelet clumps, no blast cells noted, neutrophils noted, nucleated RBC noted 63 yo F w/ Afib initially presented to urgent care for SOB where she had an XR done concerning for b/l lower infiltrates, sent to [facility] ED and  admitted to [facility] on 8/26 after being found to be hypoxemic, reported having  debilitating b/l hand numbness/tingling and some muscles weakness several months. Found to be hypoxic and have interstitial lung disease appearance on CT, admitted 8/26 for AHRF, further ILD workup and management,  started on prednisone on admission with gradually improving O2 requirement and has been stable on 2-3LNC since 9/3, underwent bronchoscopy with TBBX on 8/30 which showed Non-Specific Intersitial Disease (NSIP)/OP. Labs notable for positive ARIANA 1:1280, RNP > 8, Alejandro > 8. Patient continued on steroids and recieved cellcept, which was discontinued 2/2 Afib-RVR. Interval CTA chest on 9/11 also negative PE did show possible lingula pneumonia.  Started on empiric vancomycin and zosyn 9/12, course was then c/b episode of SVT to 170s w/ rapidly progressive hypoxemic respiratory failure, placed NRB offered HFNC/BiPAP but refused, leading to worsening hypoxemic respiratory failure requiring intubation  9/13. Despite best practices, patient remained hypoxemic and patient was cannulated for VV-ECMO on 9/13 and transferred to [facility]. Oncology consulted for leukopenia    #leukopenia, thrombocytopenia  - new acute leukopenia of unclear etiology, possibly in the setting of autoimmune dz vs medication induced (meropenem? bactrim? 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) w/ plasmapheresis (planned for every other day starting 9/15- ) and rituximab (9/16x1)  - thrombocytopenia likely in the setting of shock liver w/ pre-existing fatty liver and on ECMO  - would give filgrastim 480 daily until ANC >1500 in the setting of possible infxn  - acute hepatitis panel negative, send HIV  - send B12, folate, TSH  - consider consult hepatology  - peripheral blood smear reviewed: large platelets noted, no platelet clumps, no blast cells noted, neutrophils noted w/ normal number of lobes, nucleated RBC noted 63 yo F w/ Afib initially presented to urgent care for SOB where she had an XR done concerning for b/l lower infiltrates, sent to [facility] ED and  admitted to [facility] on 8/26 after being found to be hypoxemic, reported having  debilitating b/l hand numbness/tingling and some muscles weakness several months. Found to be hypoxic and have interstitial lung disease appearance on CT, admitted 8/26 for AHRF, further ILD workup and management,  started on prednisone on admission with gradually improving O2 requirement and has been stable on 2-3LNC since 9/3, underwent bronchoscopy with TBBX on 8/30 which showed Non-Specific Intersitial Disease (NSIP)/OP. Labs notable for positive ARIANA 1:1280, RNP > 8, Alejandro > 8. Patient continued on steroids and recieved cellcept, which was discontinued 2/2 Afib-RVR. Interval CTA chest on 9/11 also negative PE did show possible lingula pneumonia.  Started on empiric vancomycin and zosyn 9/12, course was then c/b episode of SVT to 170s w/ rapidly progressive hypoxemic respiratory failure, placed NRB offered HFNC/BiPAP but refused, leading to worsening hypoxemic respiratory failure requiring intubation  9/13. Despite best practices, patient remained hypoxemic and patient was cannulated for VV-ECMO on 9/13 and transferred to [facility]. Oncology consulted for leukopenia    #leukopenia, thrombocytopenia  - new acute leukopenia of unclear etiology, possibly in the setting of autoimmune dz vs medication induced (meropenem? bactrim? 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) w/ plasmapheresis (planned for every other day starting 9/15- ) and rituximab (9/16x1)  - thrombocytopenia likely in the setting of shock liver w/ pre-existing fatty liver and on ECMO  - would give filgrastim 480 daily until ANC >1500 in the setting of possible infxn  - acute hepatitis panel negative, send HIV  - send B12, folate, TSH  - consider consult hepatology  - send repeat fibrinogen, give cryoprecipitate to maintain fibrinogen > 150  - peripheral blood smear reviewed: large platelets noted, no platelet clumps, no blast cells noted, neutrophils noted w/ normal number of lobes, nucleated RBC noted 61 yo F w/ Afib initially presented to urgent care for SOB where she had an XR done concerning for b/l lower infiltrates, sent to [facility] ED and  admitted to [facility] on 8/26 after being found to be hypoxemic, reported having  debilitating b/l hand numbness/tingling and some muscles weakness several months. Found to be hypoxic and have interstitial lung disease appearance on CT, admitted 8/26 for AHRF, further ILD workup and management,  started on prednisone on admission with gradually improving O2 requirement and has been stable on 2-3LNC since 9/3, underwent bronchoscopy with TBBX on 8/30 which showed Non-Specific Intersitial Disease (NSIP)/OP. Labs notable for positive ARIANA 1:1280, RNP > 8, Alejandro > 8. Patient continued on steroids and recieved cellcept, which was discontinued 2/2 Afib-RVR. Interval CTA chest on 9/11 also negative PE did show possible lingula pneumonia.  Started on empiric vancomycin and zosyn 9/12, course was then c/b episode of SVT to 170s w/ rapidly progressive hypoxemic respiratory failure, placed NRB offered HFNC/BiPAP but refused, leading to worsening hypoxemic respiratory failure requiring intubation  9/13. Despite best practices, patient remained hypoxemic and patient was cannulated for VV-ECMO on 9/13 and transferred to [facility]. Oncology consulted for leukopenia    #leukopenia, thrombocytopenia  - new acute leukopenia of unclear etiology (partner mentioned history of leukopenia which she saw hematology for in the past w/ negative workup, however, no records were found), possibly in the setting of autoimmune dz vs medication induced (meropenem? bactrim? 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) w/ plasmapheresis (planned for every other day starting 9/15- ) and rituximab (9/16x1)  - thrombocytopenia likely in the setting of shock liver w/ pre-existing fatty liver and on ECMO  - would give filgrastim 480 daily until ANC >1500 in the setting of possible infxn  - acute hepatitis panel negative, send HIV  - send B12, folate, TSH  - consider consult hepatology  - send repeat fibrinogen, give cryoprecipitate to maintain fibrinogen > 150  - peripheral blood smear reviewed: large platelets noted, no platelet clumps, no blast cells noted, neutrophils noted w/ normal number of lobes, nucleated RBC noted

## 2023-09-16 NOTE — PROGRESS NOTE ADULT - ASSESSMENT
63 yo F w/ Afib initially presented to urgent care for SOB where she had an XR done concerning for b/l lower infiltrates, sent to [facility] ED and  admitted to [facility] on 8/26 after being found to be hypoxemic, reported having  debilitating b/l hand numbness/tingling and some muscles weakness several months. Found to be hypoxic and have interstitial lung disease appearance on CT, admitted 8/26 for AHRF, further ILD workup and management,  started on prednisone on admission with gradually improving O2 requirement and has been stable on 2-3LNC since 9/3, underwent bronchoscopy with TBBX on 8/30 which showed Non-Specific Intersitial Disease (NSIP)/OP. Labs notable for positive ARIANA 1:1280, RNP > 8, Alejandro > 8. Patient continued on steroids and recieved cellcept, which was discontinued 2/2 Afib-RVR. Interval CTA chest on 9/11 also negative PE did show possible lingula pneumonia.  Started on empiric vancomycin and zosyn 9/12, course was then c/b episode of SVT to 170s w/ rapidly progressive hypoxemic respiratory failure, placed NRB offered HFNC/BiPAP but refused, leading to worsening hypoxemic respiratory failure requiring intubation  9/13. Despite best practices, patient remained hypoxemic and patient was cannulated for VV-ECMO on 9/13 and transferred to [facility].      NEUROLOGY  Home meds: gabapentin 100g TID  AA&Ox3 at baseline   - continue sedation with propfol and dilaudid i/s/o severe hypoxemic respiratory failure  - during sedation vacation yesterday, opened eyes, moved LE and R upper arm spontaneously but not following commands  - received cisatracurium IVP given for procedures yesterday  - low threshold to paralyze if dysynchronus   - [facility] 9/14 no acute findings  - Neurology following at OSH for pain and numbness of upper extremities x3 mos likely  neuropathy or myopathy r/t  underlying rheum condition, planned for EMG outpt w/Dr. Marie or Dr. Urbano  - Palliative following  - Social Work consult  - PT consult     PULMONARY  Acute hypoxemic respiratory failure 2/2 bacterial PNA vs atypical PNA vs aspiration vs AEILD. Intubated on 9/13  requiring VV ECMO. Patient had reported chronic SOB for several months, had a CT scan that was negative for PE. She also states that she was seen by a pulmonologist and rheumatology, where they ruled out lupus and other immunological disorders.  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   #Acute on Chronic Hypoxemic Respiratory Failure  # VV ECMO  - Current settings: PC  RR 12, PS 12, PEEP 12 Fi02 40% Ppeak: 24 pulling TV ~40-70  - Emergency settings: VC 28/400/12/100%  - Bronchoscopy showed mild thick yellow secretions in trachea, diffuse moderate thin green/brown secretions throughout, follow up BAL cultures  - airway clearance to assist facilitate secretion mobilization with IPV and duonebs  - f/u BAL cultures, urine strep and legionella  - c/w empiric abx       ECMO  VV ECMO		  Cannulation sites/dates: 9/13/23 R fem 25F. RIJ 19F   Flow: 3.75		P1: 176  	Delta P: 22  RPM: 2880		P2: 154		SVO2: 80  Sweep:   4     L/min:		FIO2:   100      ECMO Calculated CO:  4.9  DO2/VO2: .08  VO2/DO2: 12.037    - right groin cannula pulled-back ~3-4cm to position tip of cannula just inferior to hepatic vein.  - clinically perfusing. Lactate: 1.9  - ECMO sweep sighing q1hr  - Adjust circuit flow as tolerated with DO2:VO2 goal >2  - Monitor for hemolysis, chatter, evidence of recirculation,       CARDIOVASCULAR  #Tachyarrhythmia  #AF RVR  #Distributive Shock  Hx of Afib w at OSH, started on Amiodarone gtt now in shock state requiring low dose pressors most likely vasoplegic in the setting sedation, now with bradycardia   Shock state requiring pressors   - likely septic with component of vaspoplegia i/s/o sedation, possible contribution of cardiogenic from RV dysfunction  - c/w norepinephrine, will wean off Roosevelt today maintaining map >65 and continue Vasopressin for now while on NO2  - discontinue amio gtt iso bradycardia, converted to sinus 9/14  - TTE 9/12 with EF 65-70% with normal LV and RV  - TTE 9/14 with  EF 18%. Severe global LV systolic dysfunction. RV enlargement with decreased RV systolic function, however RITCHIE and recent POCUS shows normal LV systolic function, normal RV systolic function with moderate enlargement. VTi 23.4  - POCUS:  Normal LVSF. RV systolic function improved from yesterday  - CT negative for PE    #Concern for Right Heart dysfunction  #Mild pHTN  - Likely i/s/o hypoxic vasoconstriction and high PEEP  - s/p milrinone gtt, d/c'd 9/13  - No PE seen on invasive pulmonary angiography at time of ECMO cannulation or in recent imaging  - NO2 lowered to 10ppm, will continue to wean while watching RV function       RENAL  sCr baseline 0.78  - Creatinine wnl, 0.83 today   - monitor i/o's, positive +1L LOS/ positive +1.6 L 24hrs  - s/p lasix, goal to keep 1-1.5Liters net negative,   - plan for hemoconcentrator to assist with fluid removal  - indwelling catheter in place, making good UO  - monitor, Replete to K>4, Phos>3, Mg>2, iCa>1      GASTROINTESTINAL  # Nutrition  #Transaminase elevation  - LFT's elevated, now slightly downtrending, however T.bili is high and uptrending, likely 2/2 combination of shock liver and malposition of ECMO cannula, if not improving can consider pulling back ECMO cannula  - Triglycerides elevated 836, currently on insulin gtt for hyperglycemia, will continue to monitor and consider using ketamine/precedex for sedation in place of propofol if continues to rise  - RUQ US 9/15 shows fatty infiltration of liver, with no cholelithiasis or biliary ductal dilatation.  - continue tube feeds via NGT  - continue PPI iso steroids   - continue bowel regimen senna and miralax and naloxogel to avoid opioid induced constipation  - nutrition following    INFECTIOUS DISEASE  No leukocytosis, afebrile  - s/p zosyn at OSH (9/12) for lingula PNA  - c/w empiric donald (9/13- ) for 7 day course  - can d/c vancomycin (9/12-9/15 ) negative mrsa PCR, and azithro (9/14-9/15) neg Urine legionella  - c/w Bactrim DS for PJP prophylaxis  - 9/13 Ucx and blood cx ngtd, RVP negative  - f/u BAL cyto, cell count, cultures, PCP PCR, fungitell, aspergillus  - f/u blood and urine cultures       ENDOCRINE  # Hyperglycemia i/s/o steroids  Admission A1c 6.1  - hyperglycemia iso steroids   - blood glucose remained elevated despite increasing -399  - start insulin gtt for tighter glycemic control, and now found to have elevated triglycerides  - monitor closely and adjust as needed      HEME  # ECMO anticoagulation  - continue argatroban gtt goal aPTT 50-70  - INR elevated likely iso argatroban and liver dysfxn, s/p Vit. K and FFP x1  - fibrinogen 237  - Hgb stable 11.9  - Transfuse for Hgb <7, Plt<10    #R/O DVT  - LE duplex negative for DVT but will repeat  - f/u duplex    MSK  #Hx Sciatica  - c/o B/l hand and arm weakness and numbness ?2/2 to radiculopathy, unnclear etiology, likely myositis since pt has been having worsening weakness with movement of extremities vs Cellcept s/e. Symptoms improving subjectively.  - f/u with Dr. Nik Marie or Dante Urbano for EMG upon discharge (osh?)      RHEUM  - CT findings at OSH consistent with ILD   - CT findings, Improved/decreased extent of bilateral groundglass opacities and reticular markings compared to the previous study. Findings are nonspecific but differential etiologies include interstitial pneumonia, drug toxicity, nonspecific connective tissue disorders.  - OSH labs show strongly positive ARIANA, RNP, and anti smith antibodies with low C4 and normal C3. 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 First session today  (9/15), next session planned for Sunday 0730am (will need to coordinate with perfusion)  - plan to start Cyclophosphamide, but due to the patient's severe hepatitis and elevated LFT's tomorrow, we may consider using Rituximab as an alternative immunosuppressive      PROPHYLAXIS  # DVT: argatroban  # GI: TF      LINES  -Ecmo Cannulas  -[facility] TLC- 9/13 (placed at OSH)  -Left Axill Traci - 9/13 (placed at OSH)      GOC  -Palliative Following    DISPO: ICU  CODE STATUS: Full   61 yo F w/ Afib initially presented to urgent care for SOB where she had an XR done concerning for b/l lower infiltrates, sent to [facility] ED and  admitted to [facility] on 8/26 after being found to be hypoxemic, reported having  debilitating b/l hand numbness/tingling and some muscles weakness several months. Found to be hypoxic and have interstitial lung disease appearance on CT, admitted 8/26 for AHRF, further ILD workup and management,  started on prednisone on admission with gradually improving O2 requirement and has been stable on 2-3LNC since 9/3, underwent bronchoscopy with TBBX on 8/30 which showed Non-Specific Intersitial Disease (NSIP)/OP. Labs notable for positive ARIANA 1:1280, RNP > 8, Alejandro > 8. Patient continued on steroids and recieved cellcept, which was discontinued 2/2 Afib-RVR. Interval CTA chest on 9/11 also negative PE did show possible lingula pneumonia.  Started on empiric vancomycin and zosyn 9/12, course was then c/b episode of SVT to 170s w/ rapidly progressive hypoxemic respiratory failure, placed NRB offered HFNC/BiPAP but refused, leading to worsening hypoxemic respiratory failure requiring intubation  9/13. Despite best practices, patient remained hypoxemic and patient was cannulated for VV-ECMO on 9/13 and transferred to [facility].      NEUROLOGY  Home meds: gabapentin 100g TID  AA&Ox3 at baseline   - opened eyes, moved LE and R upper arm spontaneously but did not following commands off sedation  - currently sedated w propfol and dilaudid i/s/o severe hypoxemic respiratory failure, plan to wean dilaudid today to RASS -1/-2  - received cisatracurium IVP given for procedures yesterday  - low threshold to paralyze if dysynchronus   - CT 9/14 no acute findings  - Neurology following at OSH for pain and numbness of upper extremities x3 mos likely  neuropathy or myopathy r/t  underlying rheum condition, planned for EMG outpt w/Dr. Marie or Dr. Urbano  - Palliative following  - Social Work consult  - PT consult     PULMONARY  Acute hypoxemic respiratory failure 2/2 bacterial PNA vs atypical PNA vs aspiration vs AEILD. Intubated on 9/13  requiring VV ECMO. Patient had reported chronic SOB for several months, had a CT scan that was negative for PE. She also states that she was seen by a pulmonologist and rheumatology, where they ruled out lupus and other immunological disorders.  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   #Acute on Chronic Hypoxemic Respiratory Failure  # VV ECMO  - Current settings: PC  RR 12, PS 12, PEEP 12 Fi02 40% Ppeak: 24 pulling TV ~40-70  - Emergency settings: VC 28/400/12/100%  - Bronchoscopy showed mild thick yellow secretions in trachea, diffuse moderate thin green/brown secretions throughout, serial BAL x3 performed of RML with progressively bloody return indicating DAH likely 2/2 rheumatic disease?  follow up BAL cultures  - airway clearance to assist facilitate secretion mobilization with IPV and duonebs  - f/u BAL cultures, urine strep and legionella  - c/w empiric abx       ECMO  VV ECMO		  Cannulation sites/dates: 9/13/23 R fem 25F. RIJ 19F   Flow: 3.7		P1: 176  	Delta P: 22  RPM: 3000		P2: 154		SVO2: 80  Sweep:   2.5                   L/min:              FIO2:   .70      ECMO Calculated CO:  4.0  DO2/VO2: .08  VO2/DO2: 12.037    - right groin cannula pulled-back ~3-4cm to position tip of cannula just inferior to hepatic vein 9/14  - clinically perfusing. Lactate: 1.4  - ECMO sweep sighing q1hr  - Adjust circuit flow as tolerated with DO2:VO2 goal >2  - Monitor for hemolysis, chatter, evidence of recirculation,       CARDIOVASCULAR  #Tachyarrhythmia  #AF RVR  #Distributive Shock  Hx of Afib w at OSH, started on Amiodarone gtt now in shock state requiring low dose pressors most likely vasoplegic in the setting sedation, now with bradycardia   Shock state requiring pressors   - likely septic with component of vaspoplegia i/s/o sedation, possible contribution of cardiogenic from RV dysfunction  - c/w norepinephrine, will wean off Roosevelt today maintaining map >65 and continue Vasopressin for now while on NO2  - converted to sinus 9/14, discontinued amio gtt iso bradycardia   - TTE 9/12 with EF 65-70% with normal LV and RV  - TTE 9/14 with  EF 18%. Severe global LV systolic dysfunction. RV enlargement with decreased RV systolic function, however RITCHIE and recent POCUS shows normal LV systolic function, improved RV systolic function with moderate enlargement. VTi 23.4  - CT negative for PE    #Concern for Right Heart dysfunction  #Mild pHTN  - Likely i/s/o hypoxic vasoconstriction and high PEEP  - s/p milrinone gtt, d/c'd 9/13  - No PE seen on invasive pulmonary angiography at time of ECMO cannulation or in recent imaging  - NO2 discontinued 9/15, RV function unchanged       RENAL  sCr baseline 0.78  - Creatinine wnl, 0.83 today   - monitor i/o's, positive +1L LOS/ positive +1.6 L 24hrs  - s/p lasix, goal to keep 1-1.5Liters net negative,   - plan for hemoconcentrator to assist with fluid removal  - indwelling catheter in place, making good UO  - monitor, Replete to K>4, Phos>3, Mg>2, iCa>1      GASTROINTESTINAL  # Nutrition  #Transaminase elevation  - LFT's elevated, now slightly downtrending, however T.bili is high and uptrending, likely 2/2 combination of shock liver and malposition of ECMO cannula, if not improving can consider pulling back ECMO cannula  - Triglycerides elevated 836, currently on insulin gtt for hyperglycemia, will continue to monitor and consider using ketamine/precedex for sedation in place of propofol if continues to rise  - RUQ US 9/15 shows fatty infiltration of liver, with no cholelithiasis or biliary ductal dilatation.  - continue tube feeds via NGT  - continue PPI iso steroids   - continue bowel regimen senna and miralax and naloxogel to avoid opioid induced constipation  - nutrition following    INFECTIOUS DISEASE  No leukocytosis, afebrile  - s/p zosyn at OSH (9/12) for lingula PNA  - c/w empiric donald (9/13- ) for 7 day course  - can d/c vancomycin (9/12-9/15 ) negative mrsa PCR, and azithro (9/14-9/15) neg Urine legionella  - c/w Bactrim DS for PJP prophylaxis  - 9/13 Ucx and blood cx ngtd, RVP negative  - f/u BAL cyto, cell count, cultures, PCP PCR, fungitell, aspergillus  - f/u blood and urine cultures       ENDOCRINE  # Hyperglycemia i/s/o steroids  Admission A1c 6.1  - hyperglycemia iso steroids   - blood glucose remained elevated despite increasing -399  - start insulin gtt for tighter glycemic control, and now found to have elevated triglycerides  - monitor closely and adjust as needed      HEME  # ECMO anticoagulation  - continue argatroban gtt goal aPTT 50-70  - INR elevated likely iso argatroban and liver dysfxn, s/p Vit. K and FFP x1  - fibrinogen 237  - Hgb stable 11.9  - Transfuse for Hgb <7, Plt<10    #R/O DVT  - LE duplex negative for DVT but will repeat  - f/u duplex    MSK  #Hx Sciatica  - c/o B/l hand and arm weakness and numbness ?2/2 to radiculopathy, unnclear etiology, likely myositis since pt has been having worsening weakness with movement of extremities vs Cellcept s/e. Symptoms improving subjectively.  - f/u with Dr. Nik Marie or Dante Urbano for EMG upon discharge (osh?)      RHEUM  - CT findings at OSH consistent with ILD   - CT findings, Improved/decreased extent of bilateral groundglass opacities and reticular markings compared to the previous study. Findings are nonspecific but differential etiologies include interstitial pneumonia, drug toxicity, nonspecific connective tissue disorders.  - OSH labs show strongly positive ARIANA, RNP, and anti smith antibodies with low C4 and normal C3. 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 First session today  (9/15), next session planned for Sunday 0730am (will need to coordinate with perfusion)  - plan to start Cyclophosphamide, but due to the patient's severe hepatitis and elevated LFT's tomorrow, we may consider using Rituximab as an alternative immunosuppressive      PROPHYLAXIS  # DVT: argatroban  # GI: TF      LINES  -Ecmo Cannulas  -[facility] TLC- 9/13 (placed at OSH)  -Left Axill [place] - 9/13 (placed at OSH)      GOC  -Palliative Following    DISPO: ICU  CODE STATUS: Full   61 yo F w/ Afib initially presented to urgent care for SOB where she had an XR done concerning for b/l lower infiltrates, sent to [facility] ED and  admitted to [facility] on 8/26 after being found to be hypoxemic, reported having  debilitating b/l hand numbness/tingling and some muscles weakness several months. Found to be hypoxic and have interstitial lung disease appearance on CT, admitted 8/26 for AHRF, further ILD workup and management,  started on prednisone on admission with gradually improving O2 requirement and has been stable on 2-3LNC since 9/3, underwent bronchoscopy with TBBX on 8/30 which showed Non-Specific Intersitial Disease (NSIP)/OP. Labs notable for positive ARIANA 1:1280, RNP > 8, Alejandro > 8. Patient continued on steroids and recieved cellcept, which was discontinued 2/2 Afib-RVR. Interval CTA chest on 9/11 also negative PE did show possible lingula pneumonia.  Started on empiric vancomycin and zosyn 9/12, course was then c/b episode of SVT to 170s w/ rapidly progressive hypoxemic respiratory failure, placed NRB offered HFNC/BiPAP but refused, leading to worsening hypoxemic respiratory failure requiring intubation  9/13. Despite best practices, patient remained hypoxemic and patient was cannulated for VV-ECMO on 9/13 and transferred to [facility].      NEUROLOGY  Home meds: gabapentin 100g TID  AA&Ox3 at baseline   - opened eyes, moved LE and R upper arm spontaneously but did not following commands off sedation  - currently sedated w propfol and dilaudid i/s/o severe hypoxemic respiratory failure, plan to wean dilaudid today to RASS -1/-2  - received cisatracurium IVP given for procedures yesterday  - low threshold to paralyze if dysynchronus   - CT 9/14 no acute findings  - Neurology following at OSH for pain and numbness of upper extremities x3 mos likely  neuropathy or myopathy r/t  underlying rheum condition, planned for EMG outpt w/Dr. Marie or Dr. Urbano  - Palliative following  - Social Work consult  - PT consult     PULMONARY  Acute hypoxemic respiratory failure 2/2 bacterial PNA vs atypical PNA vs aspiration vs AEILD. Intubated on 9/13  requiring VV ECMO. Patient had reported chronic SOB for several months, had a CT scan that was negative for PE. She also states that she was seen by a pulmonologist and rheumatology, where they ruled out lupus and other immunological disorders.  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   #Acute on Chronic Hypoxemic Respiratory Failure  # VV ECMO  - Current settings: PC  RR 12, PS 12, PEEP 12 Fi02 40% Ppeak: 24 pulling TV ~40-70  - Emergency settings: VC 28/400/12/100%  - Bronchoscopy showed mild thick yellow secretions in trachea, diffuse moderate thin green/brown secretions throughout, serial BAL x3 performed of RML with progressively bloody return indicating DAH likely 2/2 rheumatic disease?  follow up BAL cultures  - airway clearance to assist facilitate secretion mobilization with IPV and duonebs  - f/u BAL cultures, urine strep and legionella  - c/w empiric abx       ECMO  VV ECMO		  Cannulation sites/dates: 9/13/23 R fem 25F. RIJ 19F   Flow: 3.7		P1: 176  	Delta P: 22  RPM: 3000		P2: 154		SVO2: 80  Sweep:   2.5                   L/min:              FIO2:   .70      ECMO Calculated CO:  4.0  DO2/VO2: .08  VO2/DO2: 12.037    - right groin cannula pulled-back ~3-4cm to position tip of cannula just inferior to hepatic vein 9/14  - clinically perfusing. Lactate: 1.4  - ECMO sweep sighing q1hr  - Adjust circuit flow as tolerated with DO2:VO2 goal >2  - Monitor for hemolysis, chatter, evidence of recirculation,       CARDIOVASCULAR  #Tachyarrhythmia  #AF RVR  #Distributive Shock  Hx of Afib w at OSH, started on Amiodarone gtt now in shock state requiring low dose pressors most likely vasoplegic in the setting sedation, now with bradycardia   Shock state requiring pressors   - likely septic with component of vaspoplegia i/s/o sedation, possible contribution of cardiogenic from RV dysfunction  - c/w norepinephrine, will wean off Roosevelt today maintaining map >65 and continue Vasopressin for now while on NO2  - converted to sinus 9/14, discontinued amio gtt iso bradycardia   - TTE 9/12 with EF 65-70% with normal LV and RV  - TTE 9/14 with  EF 18%. Severe global LV systolic dysfunction. RV enlargement with decreased RV systolic function, however RITCHIE and recent POCUS shows normal LV systolic function, improved RV systolic function with moderate enlargement. VTi 23.4  - CT negative for PE    #Concern for Right Heart dysfunction  #Mild pHTN  - Likely i/s/o hypoxic vasoconstriction and high PEEP  - s/p milrinone gtt, d/c'd 9/13  - No PE seen on invasive pulmonary angiography at time of ECMO cannulation or in recent imaging  - NO2 discontinued 9/15, RV function unchanged       RENAL  sCr baseline 0.78  - Creatinine wnl, 0.83 today   - monitor i/o's, negative -60ml LOS/ negative -1.1L 24hrs  - s/p diuresis with lasix, last administered 9/15  - now with hemoconcentrator to assist with fluid removal, goal to keep 1-2L net negative  - indwelling catheter in place, making good UO  - monitor, Replete to K>4, Phos>3, Mg>2, iCa>1      GASTROINTESTINAL  # Nutrition  #Transaminase elevation likely 2/2 combination of shock liver, malposition of ECMO cannula and liver dysfunction  - ALK normal 67, AST downtrending but remain elevated 525, ALT continues to rise 992, T.bili remains elevated 5 (mostly direct)  - RUQ US 9/15 shows fatty infiltration of liver, with no cholelithiasis or biliary ductal dilatation.  - Triglycerides elevated 836, on insulin gtt for hyperglycemia, now downtrending 406 > 271  - continue tube feeds via NGT  - no BM reported since admission, can give reglan 5mg IVP x 3 doses for possible gastroparesis, QTc  416  - continue bowel regimen senna, miralax and naloxogel to avoid opioid induced constipation  - CT abdomen 9/15 w/o bowel obstruction, noted with colonic diverticulosis mostly in sigmoid, w/o evidence of diverticulitis  - continue PPI iso steroids   - nutrition following    INFECTIOUS DISEASE  Neutropenia, afebrile  - WBC 0.98, repeat cultures sent 9/16  - s/p zosyn at OSH (9/12) for lingula PNA  - c/w empiric donald (9/13- ) for 7 day course  - vancomycin (9/12-9/15 ) d/c'd negative mrsa PCR, and azithro (9/14-9/15) d/c'd neg Urine legionella  - Bactrim DS discontinued iso neutropenia, will start Mepron for for PJP prophylaxis instead  - 9/13 Ucx and blood cx ngtd, RVP negative  - f/u BAL cyto, cell count, cultures, PCP PCR, fungitell, aspergillus  - f/u blood and urine cultures       ENDOCRINE  # Hyperglycemia i/s/o steroids  Admission A1c 6.1  - hyperglycemia iso steroids   - blood glucose remained elevated despite increasing -399  - start insulin gtt for tighter glycemic control, and now found to have elevated triglycerides  - triglycerides are downtrending, 835 > 406>271, continue to monitor daily  - monitor closely and adjust as needed      HEME  # ECMO anticoagulation  - continue argatroban gtt goal aPTT 50-70  - INR elevated likely iso argatroban and liver dysfxn, s/p Vit. K and FFP x1  - fibrinogen 237  - Hgb stable 11.9  - Transfuse for Hgb <7, Plt<10  - thrombocytopenic 48 today  - Heme consult placed thrombocytopenia and neutropenia, f/u recs    #R/O DVT  - LE duplex negative for DVT but will repeat  - f/u duplex    MSK  #Hx Sciatica  - c/o B/l hand and arm weakness and numbness ?2/2 to radiculopathy, unnclear etiology, likely myositis since pt has been having worsening weakness with movement of extremities vs Cellcept s/e. Symptoms improving subjectively.  - f/u with Dr. Nik Marie or Dante Urbano for EMG upon discharge (osh?)      RHEUM  - CT findings at OSH consistent with ILD   - CT findings, Improved/decreased extent of bilateral groundglass opacities and reticular markings compared to the previous study. Findings are nonspecific but differential etiologies include interstitial pneumonia, drug toxicity, nonspecific connective tissue disorders.  - OSH labs show strongly positive ARIANA, RNP, and anti smith antibodies with low C4 and normal C3. 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 First session  (9/15), next session planned for Monday (will need to coordinate with perfusion)  - plan for Rituximab today as an alternative immunosuppressive      PROPHYLAXIS  # DVT: argatroban  # GI: TF      LINES  -Ecmo Cannulas  -LIJ TLC- 9/13 (placed at OSH)  -Left Axill Traci - 9/13 (placed at OSH)      GOC  -Palliative Following    DISPO: ICU  CODE STATUS: Full   63 yo F w/ Afib initially presented to urgent care for SOB where she had an XR done concerning for b/l lower infiltrates, sent to [facility] ED and  admitted to [facility] on 8/26 after being found to be hypoxemic, reported having  debilitating b/l hand numbness/tingling and some muscles weakness several months. Found to be hypoxic and have interstitial lung disease appearance on CT, admitted 8/26 for AHRF, further ILD workup and management,  started on prednisone on admission with gradually improving O2 requirement and has been stable on 2-3LNC since 9/3, underwent bronchoscopy with TBBX on 8/30 which showed Non-Specific Intersitial Disease (NSIP)/OP. Labs notable for positive ARIANA 1:1280, RNP > 8, Alejandro > 8. Patient continued on steroids and recieved cellcept, which was discontinued 2/2 Afib-RVR. Interval CTA chest on 9/11 also negative PE did show possible lingula pneumonia.  Started on empiric vancomycin and zosyn 9/12, course was then c/b episode of SVT to 170s w/ rapidly progressive hypoxemic respiratory failure, placed NRB offered HFNC/BiPAP but refused, leading to worsening hypoxemic respiratory failure requiring intubation  9/13. Despite best practices, patient remained hypoxemic and patient was cannulated for VV-ECMO on 9/13 and transferred to [facility].      NEUROLOGY  Home meds: gabapentin 100g TID  AA&Ox3 at baseline   - opened eyes, moved LE and R upper arm spontaneously but did not following commands off sedation  - currently sedated w propfol and dilaudid i/s/o severe hypoxemic respiratory failure, plan to wean dilaudid today to RASS -1/-2  - received cisatracurium IVP given for procedures yesterday  - low threshold to paralyze if dysynchronus   - CTH 9/14 no acute findings  - Neurology following at OSH for pain and numbness of upper extremities x3 mos likely  neuropathy or myopathy r/t  underlying rheum condition, planned for EMG outpt w/Dr. Marie or Dr. Urbano  - Palliative following  - Social Work consult  - PT consult     PULMONARY  Acute hypoxemic respiratory failure 2/2 bacterial PNA vs atypical PNA vs aspiration vs AEILD. Intubated on 9/13 and cannulated VV ECMO  Reported chronic SOB for several months, had a CT scan that was negative for PE, as per family was seen by a pulmonologist and rheumatology, was r/o for lupus and other immunological disorders. 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   - Current settings: PC  RR 12, PS 12, PEEP 12 Fi02 40% Ppeak: 24 pulling TV ~40-70  - Emergency settings: VC 28/400/12/100%  - Bronchoscopy showed mild thick yellow secretions in trachea, diffuse moderate thin green/brown secretions throughout, serial BAL x3 performed of RML with progressively bloody return indicating DAH likely 2/2 rheumatic disease?  - airway clearance to assist facilitate secretion mobilization with IPV and duonebs  - f/u BAL cultures, urine strep and legionella  - c/w empiric abx       ECMO  VV ECMO		  Cannulation sites/dates: 9/13/23 R fem 25F. RIJ 19F   Flow: 3.7		P1: 176  	Delta P: 22  RPM: 3000		P2: 154		SVO2: 80  Sweep:   2.5                   L/min:              FIO2:   .70      ECMO Calculated CO:  4.0  DO2/VO2: .08  VO2/DO2: 12.037    - 9/14 right groin cannula pulled-back ~3-4cm to position tip of cannula just inferior to hepatic vein   - clinically perfusing. Lactate: 1.4  - ECMO sweep sighing q1hr  - Adjust circuit flow as tolerated with DO2:VO2 goal >2  - Monitor for hemolysis, chatter, evidence of recirculation       CARDIOVASCULAR  Hx of Afib w at OSH, started on Amiodarone gtt now in shock state requiring low dose pressors most likely vasoplegic in the setting sedation. Shock state requiring pressors likely septic with component of vaspoplegia i/s/o sedation, possible contribution of cardiogenic from RV dysfunction  - c/w norepinephrine to maintain map > 65  - Roosevelt off since (  ) and Vaso d/c'd   - converted to sinus 9/14, discontinued amio gtt iso bradycardia   - TTE 9/12 with EF 65-70% with normal LV and RV  - TTE 9/14 with  EF 18%. Severe global LV systolic dysfunction. RV enlargement with decreased RV systolic function, however RITCHIE and recent POCUS shows normal LV systolic function, improved RV systolic function with moderate enlargement. VTi 23.4  - CT negative for PE    #Concern for Right Heart dysfunction  #Mild pHTN  - Likely i/s/o hypoxic vasoconstriction and high PEEP  - s/p milrinone gtt, d/c'd 9/13  - No PE seen on invasive pulmonary angiography at time of ECMO cannulation or in recent imaging  - NO2 discontinued 9/15, RV function unchanged       RENAL  sCr baseline 0.78  - Creatinine wnl, 0.83 today   - monitor i/o's, negative -60ml LOS/ negative -1.1L 24hrs  - s/p diuresis with lasix, last administered 9/15  - now with hemoconcentrator to assist with fluid removal, goal to keep 1-2L net negative  - indwelling catheter in place, making good UO  - monitor, Replete to K>4, Phos>3, Mg>2, iCa>1      GASTROINTESTINAL  # Nutrition  #Transaminase elevation likely 2/2 combination of shock liver, malposition of ECMO cannula and liver dysfunction  - ALK normal 67, AST downtrending but remain elevated 525, ALT continues to rise 992, T.bili remains elevated 5 (mostly direct)  - RUQ US 9/15 shows fatty infiltration of liver, with no cholelithiasis or biliary ductal dilatation.  - Triglycerides elevated 836, on insulin gtt for hyperglycemia, now downtrending 406 > 271  - continue tube feeds via NGT  - no BM reported since admission, can give reglan 5mg IVP x 3 doses for possible gastroparesis, QTc  416  - continue bowel regimen senna, miralax and naloxogel to avoid opioid induced constipation  - CT abdomen 9/15 w/o bowel obstruction, noted with colonic diverticulosis mostly in sigmoid, w/o evidence of diverticulitis  - continue PPI iso steroids   - nutrition following    INFECTIOUS DISEASE  Neutropenia, afebrile  - WBC 0.98, repeat cultures sent 9/16  - s/p zosyn at OSH (9/12) for lingula PNA  - c/w empiric donald (9/13- ) for 7 day course  - vancomycin (9/12-9/15 ) d/c'd negative mrsa PCR, and azithro (9/14-9/15) d/c'd neg Urine legionella  - Bactrim DS discontinued iso neutropenia, will start Mepron for for PJP prophylaxis instead  - 9/13 Ucx and blood cx ngtd, RVP negative  - f/u BAL cyto, cell count, cultures, PCP PCR, fungitell, aspergillus  - f/u blood and urine cultures       ENDOCRINE  # Hyperglycemia i/s/o steroids  Admission A1c 6.1  - hyperglycemia iso steroids   - blood glucose remained elevated despite increasing -399  - start insulin gtt for tighter glycemic control, and now found to have elevated triglycerides  - triglycerides are downtrending, 835 > 406>271, continue to monitor daily  - monitor closely and adjust as needed      HEME  # ECMO anticoagulation  - continue argatroban gtt goal aPTT 50-70  - INR elevated likely iso argatroban and liver dysfxn, s/p Vit. K and FFP x1  - fibrinogen 237  - Hgb stable 11.9  - Transfuse for Hgb <7, Plt<10  - thrombocytopenic 48 today  - Heme consult placed thrombocytopenia and neutropenia, f/u recs    #R/O DVT  - LE duplex negative for DVT but will repeat  - f/u duplex    MSK  #Hx Sciatica  - c/o B/l hand and arm weakness and numbness ?2/2 to radiculopathy, unnclear etiology, likely myositis since pt has been having worsening weakness with movement of extremities vs Cellcept s/e. Symptoms improving subjectively.  - f/u with Dr. Nik Marie or Dante Urbano for EMG upon discharge (osh?)      RHEUM  - CT findings at OSH consistent with ILD   - CT findings, Improved/decreased extent of bilateral groundglass opacities and reticular markings compared to the previous study. Findings are nonspecific but differential etiologies include interstitial pneumonia, drug toxicity, nonspecific connective tissue disorders.  - OSH labs show strongly positive ARIANA, RNP, and anti smith antibodies with low C4 and normal C3. 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 First session  (9/15), next session planned for Monday (will need to coordinate with perfusion)  - plan for Rituximab today as an alternative immunosuppressive      PROPHYLAXIS  # DVT: argatroban  # GI: TF      LINES  -Ecmo Cannulas  -[facility] TLC- 9/13 (placed at OSH)  -Left Axill [place] - 9/13 (placed at OSH)      GOC  -Palliative Following    DISPO: ICU  CODE STATUS: Full   61 yo F w/ Afib initially presented to urgent care for SOB where she had an XR done concerning for b/l lower infiltrates, sent to [facility] ED and  admitted to [facility] on 8/26 after being found to be hypoxemic, reported having  debilitating b/l hand numbness/tingling and some muscles weakness several months. Found to be hypoxic and have interstitial lung disease appearance on CT, admitted 8/26 for AHRF, further ILD workup and management,  started on prednisone on admission with gradually improving O2 requirement and has been stable on 2-3LNC since 9/3, underwent bronchoscopy with TBBX on 8/30 which showed Non-Specific Intersitial Disease (NSIP)/OP. Labs notable for positive ARIANA 1:1280, RNP > 8, Alejandro > 8. Patient continued on steroids and recieved cellcept, which was discontinued 2/2 Afib-RVR. Interval CTA chest on 9/11 also negative PE did show possible lingula pneumonia.  Started on empiric vancomycin and zosyn 9/12, course was then c/b episode of SVT to 170s w/ rapidly progressive hypoxemic respiratory failure, placed NRB offered HFNC/BiPAP but refused, leading to worsening hypoxemic respiratory failure requiring intubation  9/13. Despite best practices, patient remained hypoxemic and patient was cannulated for VV-ECMO on 9/13 and transferred to [facility].      NEUROLOGY  Home meds: gabapentin 100g TID  AA&Ox3 at baseline   - opened eyes, moved LE and R upper arm spontaneously but did not following commands off sedation  - currently sedated w propfol and dilaudid i/s/o severe hypoxemic respiratory failure, plan to wean dilaudid today to RASS -1/-2  - received cisatracurium IVP given for procedures yesterday  - low threshold to paralyze if dysynchronus   - CTH 9/14 no acute findings  - Neurology following at OSH for pain and numbness of upper extremities x3 mos likely  neuropathy or myopathy r/t  underlying rheum condition, planned for EMG outpt w/Dr. Marie or Dr. Urbano  - Palliative following  - Social Work consult  - PT consult     PULMONARY  Acute hypoxemic respiratory failure 2/2 bacterial PNA vs atypical PNA vs aspiration vs AEILD. Intubated on 9/13 and cannulated VV ECMO  Reported chronic SOB for several months, had a CT scan that was negative for PE, as per family was seen by a pulmonologist and rheumatology, was r/o for lupus and other immunological disorders. 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   - Current settings: PC  RR 12, PS 12, PEEP 12 Fi02 40% Ppeak: 24 pulling TV ~40-70  - Emergency settings: VC 28/400/12/100%  - Bronchoscopy showed mild thick yellow secretions in trachea, diffuse moderate thin green/brown secretions throughout, serial BAL x3 performed of RML with progressively bloody return indicating DAH likely 2/2 rheumatic disease?  - airway clearance to assist facilitate secretion mobilization with IPV and duonebs  - f/u BAL cultures, urine strep and legionella  - c/w empiric abx       ECMO  VV ECMO		  Cannulation sites/dates: 9/13/23 R fem 25F. RIJ 19F   Flow: 3.7		P1: 176  	Delta P: 22  RPM: 3000		P2: 154		SVO2: 80  Sweep:   2.5                   L/min:              FIO2:   .70      ECMO Calculated CO:  4.0  DO2/VO2: .08  VO2/DO2: 12.037    - 9/14 right groin cannula pulled-back ~3-4cm to position tip of cannula just inferior to hepatic vein   - clinically perfusing. Lactate: 1.4  - ECMO sweep sighing q1hr  - Adjust circuit flow as tolerated with DO2:VO2 goal >2  - Monitor for hemolysis, chatter, evidence of recirculation       CARDIOVASCULAR  Hx of Afib w at OSH, started on Amiodarone gtt now in shock state requiring low dose pressors most likely vasoplegic in the setting sedation. Shock state requiring pressors likely septic with component of vaspoplegia i/s/o sedation, possible contribution of cardiogenic from RV dysfunction  - c/w norepinephrine to maintain map > 65  - Roosevelt off since (  ) and Vaso d/c'd   - converted to sinus 9/14, discontinued amio gtt iso bradycardia   - TTE 9/12 with EF 65-70% with normal LV and RV  - TTE 9/14 with  EF 18%. Severe global LV systolic dysfunction. RV enlargement with decreased RV systolic function, however RITCHIE and recent POCUS shows normal LV systolic function, improved RV systolic function with moderate enlargement. VTi 23.4  - CT negative for PE    #Concern for Right Heart dysfunction  #Mild pHTN  - Likely i/s/o hypoxic vasoconstriction and high PEEP  - s/p milrinone gtt, d/c'd 9/13  - No PE seen on invasive pulmonary angiography at time of ECMO cannulation or in recent imaging  - NO2 discontinued 9/15, RV function remains unchanged       RENAL  sCr baseline 0.78  - Creatinine wnl, 0.83 today   - monitor i/o's, negative -60ml LOS/ negative -1.1L 24hrs  - s/p diuresis with lasix, last administered 9/15  - now with hemoconcentrator to assist with fluid removal, goal to keep 1-2L net negative  - indwelling catheter in place, making good UO  - monitor, Replete to K>4, Phos>3, Mg>2, iCa>1      GASTROINTESTINAL  Transaminase elevation likely 2/2 combination of shock liver, malposition of ECMO cannula and liver dysfunction  - ALK normal 67, AST downtrending but remain elevated 525, ALT continues to rise 992, T.bili remains elevated 5 (mostly direct)  - Acute hepatitis panel negative  - RUQ US 9/15 shows fatty infiltration of liver, with no cholelithiasis or biliary ductal dilatation.  - Triglycerides elevated 836, on insulin gtt for hyperglycemia, now downtrending 406 > 271  - continue tube feeds via NGT  - no BM reported since admission, can give reglan 5mg IVP x 3 doses for possible gastroparesis, QTc  416  - continue bowel regimen senna, miralax and movantik to avoid opioid induced constipation  - CT abdomen 9/15 w/o bowel obstruction, noted with colonic diverticulosis mostly in sigmoid, w/o evidence of diverticulitis  - continue PPI iso steroids   - nutrition following    INFECTIOUS DISEASE  Neutropenia w/unknown etiology? possible transient? other differentials include drug induced vs infection vs malignancy?  - WBC 0.98, remains afebrile however temperature has been regulated via ECMO circuit, will turn off and monitor for fever  - Bactrim DS discontinued iso neutropenia, will start Mepron for PJP prophylaxis instead  - can repeat blood and sputum cultures 9/16  - c/w empiric donald (9/13- ) for 7 day course  - s/p zosyn at OSH (9/12) for lingula PNA  - vancomycin (9/12-9/15 ) d/c'd negative mrsa PCR, and azithro (9/14-9/15) d/c'd neg Urine legionella  - 9/13 RVP negative, urine, sputum and blood cx ngtd  - f/u BAL cyto, cell count, fungitell  - f/u legionella PCR, quantiferon, mycoplasma pneumoniae, pneumocystis jiroveci pcr      ENDOCRINE  # Hyperglycemia i/s/o steroids  Admission A1c 6.1  - hyperglycemia iso steroids, glucose remained elevated despite increase of NPH and ISS  - continue insulin gtt for tighter glycemic control, and   - elevated triglycerides 835, has hx of fatty liver, currently on propofol gtt. TG downtrending since initiating insulin gtt for hyperglycemia, now 406> 271, continue to monitor daily          HEME  # ECMO anticoagulation  - continue argatroban gtt goal aPTT 50-70  - INR elevated likely iso argatroban and liver dysfxn, s/p Vit. K and FFP x1  - fibrinogen 237  - Hgb remains stable 11.9  - Transfuse for Hgb <7, Plt<10  - platelets continue to downtrend, 48 today  - Heme consult placed for thrombocytopenia accompanied by neutropenia    #R/O DVT  - LE duplex negative for DVT but will repeat  - f/u duplex    MSK  #Hx Sciatica  - c/o B/l hand and arm weakness and numbness ?2/2 to radiculopathy, unnclear etiology, likely myositis since pt has been having worsening weakness with movement of extremities vs Cellcept s/e. Symptoms improving subjectively.  - f/u with Dr. Nik Marie or Dante Urbano for EMG upon discharge (osh?)      RHEUM  - CT findings at OSH consistent with ILD   - CT findings, Improved/decreased extent of bilateral groundglass opacities and reticular markings compared to the previous study. Findings are nonspecific but differential etiologies include interstitial pneumonia, drug toxicity, nonspecific connective tissue disorders.  - OSH labs show strongly positive ARIANA, RNP, and anti smith antibodies with low C4 and normal C3. 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 First session  (9/15), next session planned for Monday (will need to coordinate with perfusion)  - plan for Rituximab today as an alternative immunosuppressive      PROPHYLAXIS  # DVT: argatroban  # GI: TF      LINES  -Ecmo Cannulas  -LIJ TLC- 9/13 (placed at OSH)  -Left Axill Traci - 9/13 (placed at OSH)      GOC  -Palliative Following    DISPO: ICU  CODE STATUS: Full   61 yo F w/ Afib initially presented to urgent care for SOB where she had an XR done concerning for b/l lower infiltrates, sent to [facility] ED and  admitted to [facility] on 8/26 after being found to be hypoxemic, reported having  debilitating b/l hand numbness/tingling and some muscles weakness several months. Found to be hypoxic and have interstitial lung disease appearance on CT, admitted 8/26 for AHRF, further ILD workup and management,  started on prednisone on admission with gradually improving O2 requirement and has been stable on 2-3LNC since 9/3, underwent bronchoscopy with TBBX on 8/30 which showed Non-Specific Intersitial Disease (NSIP)/OP. Labs notable for positive ARIANA 1:1280, RNP > 8, Alejandro > 8. Patient continued on steroids and recieved cellcept, which was discontinued 2/2 Afib-RVR. Interval CTA chest on 9/11 also negative PE did show possible lingula pneumonia.  Started on empiric vancomycin and zosyn 9/12, course was then c/b episode of SVT to 170s w/ rapidly progressive hypoxemic respiratory failure, placed NRB offered HFNC/BiPAP but refused, leading to worsening hypoxemic respiratory failure requiring intubation  9/13. Despite best practices, patient remained hypoxemic and patient was cannulated for VV-ECMO on 9/13 and transferred to [facility].      NEUROLOGY  Home meds: gabapentin 100g TID  AA&Ox3 at baseline   - opened eyes, moved LE and R upper arm spontaneously but did not following commands off sedation  - currently sedated w propfol and dilaudid i/s/o severe hypoxemic respiratory failure, plan to wean dilaudid today to RASS -1/-2  - received cisatracurium IVP given for procedures yesterday  - low threshold to paralyze if dysynchronus   - CT 9/14 no acute findings  - Neurology following at OSH for pain and numbness of upper extremities x3 mos likely  neuropathy or myopathy r/t  underlying rheum condition, planned for EMG outpt w/Dr. Marie or Dr. Urbano  - Palliative following  - Social Work consult  - PT consult     PULMONARY  Admitted to [facility] on 8/26 after p/w SOB, found to be hypoxemic, required 2-4L NC/bibap, initially responded well to IV steroids. 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 2/2 bacterial PNA vs atypical PNA vs aspiration vs AEILD   - pt reportedly had been seen by a pulmonologist and rheumatology ([place]), and was ruled out for lupus and other immunological disorders.  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  - airway clearance to assist facilitate secretion mobilization with IPV and duonebs  - f/u BAL cultures, urine strep and legionella  - c/w empiric abx       ECMO  VV ECMO		  Cannulation sites/dates: 9/13/23 R fem 25F. RIJ 19F   Flow: 3.7		P1: 176  	Delta P: 22  RPM: 3000		P2: 154		SVO2: 80  Sweep:   2.5                   L/min:              FIO2:   .70      ECMO Calculated CO:  4.0-4.9  DO2/VO2:   VO2/DO2:     - 9/14 right groin cannula pulled-back ~3-4cm to position tip of cannula just inferior to hepatic vein   - clinically perfusing. Lactate: 1.4  - ECMO sweep sighing q1hr  - Adjust circuit flow as tolerated with DO2:VO2 goal >2  - Monitor for hemolysis, chatter, evidence of recirculation       CARDIOVASCULAR  Hx of Afib w at OSH, started on Amiodarone gtt now in shock state requiring requiring pressors likely septic with component of vaspoplegia i/s/o sedation, possible contribution of cardiogenic from RV dysfunction   - No PE seen on invasive pulmonary angiography at time of ECMO cannulation or in recent imaging  - NO2 weaned off  (9/15) RV function remains unchanged  - s/p milrinone, off since (9/13)  - requiring pressors, continue to wean norepinephrine to maintain map > 65  - Roosevelt off since ( 9/14) and Vaso off since (9/15)  - converted to sinus (9/14) discontinued amio gtt iso bradycardia   - TTE 9/12 with EF 65-70% with normal LV and RV  - TTE 9/14 with  EF 18%. Severe global LV systolic dysfunction. RV enlargement with decreased RV systolic function, however RITCHIE and recent POCUS shows normal LV systolic function, improved RV systolic function RV< LV . LVOT VTI 20.9 cm. IVC 2.3 cm.        RENAL  sCr baseline 0.78  - Creatinine wnl, 0.83 today   - monitor i/o's, negative -60ml LOS/ negative -1.1L 24hrs  - s/p diuresis with lasix, last administered 9/15  - now with hemoconcentrator to assist with fluid removal, goal to keep 1-2L net negative  - indwelling catheter in place, making good UO  - monitor, Replete to K>4, Phos>3, Mg>2, iCa>1      GASTROINTESTINAL  Transaminase elevation likely 2/2 combination of shock liver, malposition of ECMO cannula and liver dysfunction  - ALK normal 67, AST downtrending but remain elevated 525, ALT continues to rise 992, T.bili remains elevated 5 (mostly direct)  - Acute hepatitis panel negative  - RUQ US 9/15 shows fatty infiltration of liver, with no cholelithiasis or biliary ductal dilatation.  - Triglycerides elevated 836, on insulin gtt for hyperglycemia, now downtrending 406 > 271  - tolerating tube feeds via NGT   - no BM reported since admission, can give reglan  IVP x 3 doses for gastroparesis, QTc  416  - continue bowel regimen senna, miralax and movantik to avoid opioid induced constipation  - CT abdomen 9/15 w/o bowel obstruction, noted with colonic diverticulosis mostly in sigmoid, w/o evidence of diverticulitis  - continue PPI iso steroids   - nutrition following    INFECTIOUS DISEASE  Neutropenia likely transient? vs drug induced vs infection vs malignancy?  - WBC 0.98, remains afebrile however temperature has been regulated via ECMO circuit, will turn off and monitor for fever  - Bactrim DS discontinued iso neutropenia, will start Mepron for PJP prophylaxis instead  - can repeat blood and sputum cultures 9/16  - heme consult given neutropenia and thrombocytopenia  - c/w empiric donald (9/13- ) for 7 day course  - s/p IV Ceftriaxone 5 days? at H out of an abundance of precaution to cover BAL specimen  - s/p zosyn at OSH (9/12) for lingula PNA  - vancomycin (9/12-9/15 ) d/c'd negative mrsa PCR, and azithro (9/14-9/15) d/c'd neg Urine legionella  - 9/13 RVP negative, urine, sputum and blood cx ngtd  - f/u BAL cyto, cell count, fungitell  - f/u legionella PCR, quantiferon, mycoplasma pneumoniae, pneumocystis jiroveci pcr      ENDOCRINE  # Hyperglycemia i/s/o steroids  Admission A1c 6.1  - hyperglycemia iso steroids, glucose remained elevated despite increase of NPH and ISS  - continue insulin gtt for tighter glycemic control, and   - elevated triglycerides 835, has hx of fatty liver, currently on propofol gtt. TG downtrending since initiating insulin gtt for hyperglycemia, now 406> 271, continue to monitor daily      HEME  # ECMO anticoagulation  - continue argatroban gtt goal aPTT 50-70  - INR elevated likely iso argatroban and liver dysfxn,  -  s/p Vit. K (9/14) and FFP x1 (9/15)  - fibrinogen 237, monitor daily  - Hgb remains stable 11.9  - Transfuse for Hgb <7, Plt<10  - platelets continue to downtrend, 48 today  - Heme consult placed for thrombocytopenia, noted to also be neutropenic    #R/O DVT  - LE duplex negative for DVT but will repeat  - f/u duplex- pending    MSK  Onset of debilitating b/l hand cramps and some muscles weakness x several months, unclear etiology, radiculopathy? vs myositis?   - MRI outpatient reportedly showed cervical spine issues, started on Prednisone shortly before admission at OSH  - seen by neurologist at [facility]   - symptoms improved with cellcept (9/8-9/11) but discontinued given Afib RVR   - f/u with Dr. Nik Marie or Dante Urbano for EMG upon discharge (osh?)      RHEUM  - started on Solumedrol 60mg q6h from 9/1 to 9/6 then weaned over time to then Medrol 32 mg 9/9 to 9/12 at [facility]  - s/p Cellcept 9/8 to 9/11 but stopped due to Afib- RVR/tachycardia   - CT findings, Improved/decreased extent of bilateral ground glass opacities and reticular markings compared to the previous study. Findings are nonspecific but differential etiologies include interstitial pneumonia, drug toxicity, nonspecific connective tissue disorders.  - OSH labs show strongly positive ARIANA, RNP, and anti smith antibodies with low C4 and normal C3. 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 First session  (9/15), next session planned for Monday (will need to coordinate time with perfusion and blood bank)  - plan for Rituximab today as an alternative immunosuppressive    SKIN  Reportedly had single episode of painful rash on her shins, ears and neck and chest which resolved with a steroid cream from a dermatologist prior to admission  - no evidence of rash currently      PROPHYLAXIS  # DVT: argatroban  # GI: TF      LINES  -Ecmo Cannulas  -[facility]J TLC- 9/13 (placed at OSH)  -Left Axill Traci - 9/13 (placed at OSH)  -RA 16g PIV x2- 9/15      GOC  -Palliative Following    DISPO: ICU  CODE STATUS: Full

## 2023-09-16 NOTE — PROGRESS NOTE ADULT - SUBJECTIVE AND OBJECTIVE BOX
Interval Events:  - Remains on ECMO circuit, sweep lowered from 4 to 2.5, fi01 lowered to 70%  - Hemoconcentrator placed with 25ml/hr fluid removal    HPI:  62 year old female with a past medical history of afib, and sciatica, who presented to [facility] for shortness of breath. She had gone to [facility] where she had CXR which showed bilateral lower lobe infiltrates so was sent to ER. She had been having exertional dyspnea and hypoxemia (on home pulse ox to 82%). She had been having dyspnea for several months and had a workup in [place] with a CT scan (which was negative for PE). She also states that she was seen by a pulmonologist and rheumatology, where they ruled out lupus and other immunological disorders.    [facility] Hospital Course  Found to be hypoxic and have interstitial lung disease appearance on CT, admitted 8/26 for AHRF, further ILD workup and management. TTE 8/26 with normal LVEF. Pt was started on prednisone on admission with gradually improving O2 requirement and has been stable on 2-3LNC since 9/3. Started steroid taper on 9/6 and fully transitioned to PO 9/8 overnight. Started on Mycophenolate mofetil (cellcept) 9/8 evening but pt reported subjective side effects with weakness. Episode of AF w RVR with HR up to 140s on 9/11 am, decreased to HR 10-110s with IV lopressor 10. CTA negative for PE and showed interval improvements in GGO but possible lingular bleb and RML bronchiectasis. Patient received 2L of but before more fluids and beta-blocker pt developed heart palpitations with EKG HR 170s with SVT. BPs 105/70s. Did not respond to vagal maneuvers. Pt given oral lopressor 12.5 and IV lopressor 5, with improvement of HR to 130s. SBP briefly dropped to 60-70s then recovered. Patient on NRB offered HFNC/BiPAP but refused. Started on empiric vancomycin and zosyn. BCx w/ NGTD. Per pulm increased solumedrol to 40mg qd. Patient acceded to HFNC in which she desatted and presented with increased wob for which she was transitioned to BiPAP. Patient with anxiety while on BiPAP presenting tachypnea and desatting to the 80s despite verbal redirection for which she was administered ativan 1mg IVP x1. Patient distress improved with sats in the low 90s but had desaturation to 70s. STAT CXR showed increased pulmonary congestion for which patient was administered lasix without improvement. Intubated and started on mechanical ventilation but required very high PEEP (18) and 100% FiO2 and still had low saturations. VV ECMO team consulted and accepted to [facility]. Per signout patient had RV enlargement and dysfunction on POCUS with concern for either new PE vs high pulmonary pressures due to PEEP 18 and lung dysfunction. Patient went for cannulation at [facility] with flouro showing no acute PE. Cannulated with 25 F drainage cannula in R Fem V and 23F “arterial” cannula in RIJ.    (13 Sep 2023 15:24)      REVIEW OF SYSTEMS:  [ ] All other systems negative  [x] Unable to assess ROS because patient is intubated and sedated    OBJECTIVE:  ICU Vital Signs Last 24 Hrs  T(C): 35.4 (16 Sep 2023 04:00), Max: 35.8 (15 Sep 2023 08:00)  T(F): 95.7 (16 Sep 2023 04:00), Max: 96.5 (15 Sep 2023 08:00)  HR: 66 (16 Sep 2023 06:00) (61 - 73)  BP: --  BP(mean): --  ABP: 92/54 (16 Sep 2023 06:00) (91/54 - 125/68)  ABP(mean): 70 (16 Sep 2023 06:00) (70 - 92)  RR: 12 (16 Sep 2023 06:00) (12 - 14)  SpO2: 94% (16 Sep 2023 06:00) (91% - 99%)    O2 Parameters below as of 16 Sep 2023 06:00  Patient On (Oxygen Delivery Method): ventilator    O2 Concentration (%): 40      Mode: AC/ CMV (Assist Control/ Continuous Mandatory Ventilation), RR (machine): 12, FiO2: 40, PEEP: 12, ITime: 1, MAP: 15, PC: 12, PIP: 24    09-14 @ 07:01  -  09-15 @ 07:00  --------------------------------------------------------  IN: 3393.1 mL / OUT: 1765 mL / NET: 1628.1 mL    09-15 @ 07:01  -  09-16 @ 06:31  --------------------------------------------------------  IN: 3238 mL / OUT: 4340 mL / NET: -1102 mL      CAPILLARY BLOOD GLUCOSE      POCT Blood Glucose.: 145 mg/dL (16 Sep 2023 05:50)      Physical Exam:   Constitutional: no acute distress   HEENT: + PERRLA, EOMI, no drainage or redness  Neck: supple, RIJ ECMO cannula, LIJ TLC  Respiratory: Ventilator assisted breath Sounds equal & diminished bilaterally to auscultation, no accessory muscle use noted  Cardiovascular: Sinus fady, regular rate, regular rhythm, normal S1, S2; no murmurs or rub  Gastrointestinal: Soft, non-tender, non distended, no hepatosplenomegaly, normal bowel sounds  Extremities: + 2 peripheral edema, R. Fem ECMO cannula  Vascular: Equal and normal pulses: 2+ peripheral pulses by doppler   Neurological: sedated/intubated  Psychiatric: calm, normal mood, normal affect  Musculoskeletal: No joint swelling or deformity; no limitation of movement  Skin: warm, dry, well perfused, no rashes          HOSPITAL MEDICATIONS:  Standing Meds:  albuterol/ipratropium for Nebulization 3 milliLiter(s) Nebulizer every 6 hours  argatroban Infusion 0.3 MICROgram(s)/kG/Min IV Continuous <Continuous>  chlorhexidine 0.12% Liquid 15 milliLiter(s) Oral Mucosa every 12 hours  chlorhexidine 2% Cloths 1 Application(s) Topical <User Schedule>  cisatracurium Injectable 20 milliGRAM(s) IV Push once  dextrose 5%. 1000 milliLiter(s) IV Continuous <Continuous>  dextrose 5%. 1000 milliLiter(s) IV Continuous <Continuous>  dextrose 50% Injectable 25 Gram(s) IV Push once  dextrose 50% Injectable 12.5 Gram(s) IV Push once  dextrose 50% Injectable 25 Gram(s) IV Push once  fentaNYL    Injectable 100 MICROGram(s) IV Push once  glucagon  Injectable 1 milliGRAM(s) IntraMuscular once  HYDROmorphone Infusion. 1.5 mG/Hr IV Continuous <Continuous>  insulin regular Infusion 3 Unit(s)/Hr IV Continuous <Continuous>  meropenem  IVPB 1000 milliGRAM(s) IV Intermittent every 8 hours  meropenem  IVPB      methylPREDNISolone sodium succinate Injectable 125 milliGRAM(s) IV Push once  multivitamin  Chewable 1 Tablet(s) Chew daily  naloxegol 25 milliGRAM(s) Oral daily  norepinephrine Infusion 0.3 MICROgram(s)/kG/Min IV Continuous <Continuous>  pantoprazole  Injectable 40 milliGRAM(s) IV Push daily  polyethylene glycol 3350 17 Gram(s) Oral <User Schedule>  propofol Infusion 50 MICROgram(s)/kG/Min IV Continuous <Continuous>  senna Syrup 10 milliLiter(s) Oral two times a day  trimethoprim  40 mG/sulfamethoxazole 200 mG Suspension 160 milliGRAM(s) Oral daily      PRN Meds:  dextrose Oral Gel 15 Gram(s) Oral once PRN      LABS:                        12.1   1.25  )-----------( 56       ( 16 Sep 2023 03:20 )             37.0     Hgb Trend: 12.1<--, 12.2<--, 12.2<--, 11.9<--, 11.7<--  09-16    143  |  102  |  48<H>  ----------------------------<  148<H>  3.9   |  31  |  0.95    Ca    8.6      16 Sep 2023 03:20  Phos  2.9     09-16  Mg     2.40     09-16    TPro  4.8<L>  /  Alb  3.1<L>  /  TBili  4.5<H>  /  DBili  x   /  AST  525<H>  /  ALT  992<H>  /  AlkPhos  67  09-16    Creatinine Trend: 0.95<--, 0.93<--, 0.85<--, 0.87<--, 0.83<--, 0.82<--  PT/INR - ( 16 Sep 2023 03:20 )   PT: 24.3 sec;   INR: 2.22 ratio         PTT - ( 16 Sep 2023 03:20 )  PTT:59.9 sec  Urinalysis Basic - ( 16 Sep 2023 03:20 )    Color: x / Appearance: x / SG: x / pH: x  Gluc: 148 mg/dL / Ketone: x  / Bili: x / Urobili: x   Blood: x / Protein: x / Nitrite: x   Leuk Esterase: x / RBC: x / WBC x   Sq Epi: x / Non Sq Epi: x / Bacteria: x      Arterial Blood Gas:  09-16 @ 05:50  7.42/57/78/37/96.1/10.5  ABG lactate: --  Arterial Blood Gas:  09-16 @ 03:20  7.41/59/82/37/96.9/10.7  ABG lactate: --  Arterial Blood Gas:  09-16 @ 02:01  7.38/40/77/24/96.8/-1.3  ABG lactate: --  Arterial Blood Gas:  09-16 @ 00:05  7.46/54/92/38/97.5/12.5  ABG lactate: --  Arterial Blood Gas:  09-15 @ 23:00  7.46/52/95/37/98.3/11.4  ABG lactate: --  Arterial Blood Gas:  09-15 @ 21:36  7.48/49/126/36/99.2/11.3  ABG lactate: --  Arterial Blood Gas:  09-15 @ 18:04  7.45/52/95/36/98.0/10.4  ABG lactate: --  Arterial Blood Gas:  09-15 @ 17:07  7.46/48/95/34/97.9/9.0  ABG lactate: --  Arterial Blood Gas:  09-15 @ 14:58  7.38/61/96/36/97.5/8.9  ABG lactate: --  Arterial Blood Gas:  09-15 @ 08:36  7.40/50/100/31/98.4/5.2  ABG lactate: --  Arterial Blood Gas:  09-15 @ 03:30  7.40/48/104/30/98.6/4.1  ABG lactate: --  Arterial Blood Gas:  09-14 @ 23:36  7.40/45/117/28/99.3/2.6  ABG lactate: --  Arterial Blood Gas:  09-14 @ 17:15  7.35/51/114/28/98.6/1.8  ABG lactate: --  Arterial Blood Gas:  09-14 @ 13:05  7.35/45/125/25/99.4/-1.0  ABG lactate: --  Arterial Blood Gas:  09-14 @ 07:04  7.38/41/124/24/100.0/-0.8  ABG lactate: --        MICROBIOLOGY:     Culture - Blood (collected 13 Sep 2023 17:51)  Source: .Blood Blood-Peripheral  Preliminary Report (15 Sep 2023 22:01):    No growth at 48 Hours    Culture - Blood (collected 13 Sep 2023 17:44)  Source: .Blood Blood  Preliminary Report (15 Sep 2023 22:01):    No growth at 48 Hours    Culture - Urine (collected 13 Sep 2023 17:25)  Source: Catheterized Catheterized  Final Report (14 Sep 2023 15:40):    No growth    Culture - Acid Fast - Bronchial w/Smear (collected 13 Sep 2023 15:45)  Source: .Bronchial Bronchial Lavage    Culture - Bronchial (collected 13 Sep 2023 15:45)  Source: .Bronchial Bronchial Lavage  Gram Stain (14 Sep 2023 07:27):    Rare polymorphonuclear leukocytes seen per low power field    No squamous epithelial cells seen per low power field    No organisms seen per oil power field  Preliminary Report (15 Sep 2023 07:41):    No growth to date    Culture - Fungal, Bronchial (collected 13 Sep 2023 15:45)  Source: .Bronchial Bronchial Lavage  Preliminary Report (14 Sep 2023 14:07):    Testing in progress      RADIOLOGY:  [ ] Reviewed and interpreted by me      Interval Events:    REVIEW OF SYSTEMS:  Unable to assess ROS because pt is intubated and sedated    Vital Signs Last 24 Hrs  T(C): 35.4 (16 Sep 2023 04:00), Max: 35.8 (15 Sep 2023 08:00)  T(F): 95.7 (16 Sep 2023 04:00), Max: 96.5 (15 Sep 2023 08:00)  HR: 66 (16 Sep 2023 06:00) (61 - 73)  BP: --  BP(mean): --  RR: 12 (16 Sep 2023 06:00) (12 - 14)  SpO2: 94% (16 Sep 2023 06:00) (91% - 99%)      Adult Advanced Hemodynamics Last 24 Hrs  CVP(mm Hg): --  CVP(cm H2O): --  CO: --  CI: --  PA: --  PA(mean): --  PCWP: --  SVR: --  SVRI: --  PVR: --  PVRI: --    I&O's Summary    14 Sep 2023 07:01  -  15 Sep 2023 07:00  --------------------------------------------------------  IN: 3393.1 mL / OUT: 1765 mL / NET: 1628.1 mL    15 Sep 2023 07:01  -  16 Sep 2023 06:31  --------------------------------------------------------  IN: 3238 mL / OUT: 4340 mL / NET: -1102 mL          ECMO SETTINGS:  Type:		[ ] Venovenous		[ ] Venoarterial  Cannulation Site(s):     Flow:  	          P1:                  Delta P:  RPM: 		  P2:                  SVO2:    Oxygenator:	Sweep:     L/min	FiO2:     ABG - ( 16 Sep 2023 05:50 )  pH: 7.42  /  pCO2: 57    /  pO2: 78    / HCO3: 37    / Base Excess: 10.5  /  SaO2: 96.1                  VENTILATOR SETTINGS:     Mode: AC/ CMV (Assist Control/ Continuous Mandatory Ventilation)  RR (machine): 12  FiO2: 40  PEEP: 12  ITime: 1  MAP: 15  PC: 12  PIP: 24        PHYSICAL EXAM:  General:   HEENT:   Lymph Nodes:  Neck:   Respiratory:   Cardiovascular:   Abdomen:   Extremities:   Skin:   Neurological:  Psychiatry:      LABS:                          12.1   1.25  )-----------( 56       ( 16 Sep 2023 03:20 )             37.0                          09-16    143  |  102  |  48<H>  ----------------------------<  148<H>  3.9   |  31  |  0.95    Ca    8.6      16 Sep 2023 03:20  Phos  2.9     09-16  Mg     2.40     09-16    TPro  4.8<L>  /  Alb  3.1<L>  /  TBili  4.5<H>  /  DBili  x   /  AST  525<H>  /  ALT  992<H>  /  AlkPhos  67  09-16      LIVER FUNCTIONS - ( 16 Sep 2023 03:20 )  Alb: 3.1 g/dL / Pro: 4.8 g/dL / ALK PHOS: 67 U/L / ALT: 992 U/L / AST: 525 U/L / GGT: x             PT/INR - ( 16 Sep 2023 03:20 )   PT: 24.3 sec;   INR: 2.22 ratio         PTT - ( 16 Sep 2023 03:20 )  PTT:59.9 sec      Culture - Blood (collected 13 Sep 2023 17:51)  Source: .Blood Blood-Peripheral  Preliminary Report (15 Sep 2023 22:01):    No growth at 48 Hours    Culture - Blood (collected 13 Sep 2023 17:44)  Source: .Blood Blood  Preliminary Report (15 Sep 2023 22:01):    No growth at 48 Hours    Culture - Urine (collected 13 Sep 2023 17:25)  Source: Catheterized Catheterized  Final Report (14 Sep 2023 15:40):    No growth    Culture - Acid Fast - Bronchial w/Smear (collected 13 Sep 2023 15:45)  Source: .Bronchial Bronchial Lavage    Culture - Bronchial (collected 13 Sep 2023 15:45)  Source: .Bronchial Bronchial Lavage  Gram Stain (14 Sep 2023 07:27):    Rare polymorphonuclear leukocytes seen per low power field    No squamous epithelial cells seen per low power field    No organisms seen per oil power field  Preliminary Report (15 Sep 2023 07:41):    No growth to date    Culture - Fungal, Bronchial (collected 13 Sep 2023 15:45)  Source: .Bronchial Bronchial Lavage  Preliminary Report (14 Sep 2023 14:07):    Testing in progress          ACTIVE MEDS:    MEDICATIONS  (STANDING):  albuterol/ipratropium for Nebulization 3 milliLiter(s) Nebulizer every 6 hours  argatroban Infusion 0.3 MICROgram(s)/kG/Min (2.3 mL/Hr) IV Continuous <Continuous>  chlorhexidine 0.12% Liquid 15 milliLiter(s) Oral Mucosa every 12 hours  chlorhexidine 2% Cloths 1 Application(s) Topical <User Schedule>  cisatracurium Injectable 20 milliGRAM(s) IV Push once  dextrose 5%. 1000 milliLiter(s) (50 mL/Hr) IV Continuous <Continuous>  dextrose 5%. 1000 milliLiter(s) (100 mL/Hr) IV Continuous <Continuous>  dextrose 50% Injectable 25 Gram(s) IV Push once  dextrose 50% Injectable 12.5 Gram(s) IV Push once  dextrose 50% Injectable 25 Gram(s) IV Push once  fentaNYL    Injectable 100 MICROGram(s) IV Push once  glucagon  Injectable 1 milliGRAM(s) IntraMuscular once  HYDROmorphone Infusion. 1.5 mG/Hr (1.5 mL/Hr) IV Continuous <Continuous>  insulin regular Infusion 3 Unit(s)/Hr (3 mL/Hr) IV Continuous <Continuous>  meropenem  IVPB      meropenem  IVPB 1000 milliGRAM(s) IV Intermittent every 8 hours  methylPREDNISolone sodium succinate Injectable 125 milliGRAM(s) IV Push once  multivitamin  Chewable 1 Tablet(s) Chew daily  naloxegol 25 milliGRAM(s) Oral daily  norepinephrine Infusion 0.3 MICROgram(s)/kG/Min (36.9 mL/Hr) IV Continuous <Continuous>  pantoprazole  Injectable 40 milliGRAM(s) IV Push daily  polyethylene glycol 3350 17 Gram(s) Oral <User Schedule>  propofol Infusion 50 MICROgram(s)/kG/Min (38.3 mL/Hr) IV Continuous <Continuous>  senna Syrup 10 milliLiter(s) Oral two times a day  trimethoprim  40 mG/sulfamethoxazole 200 mG Suspension 160 milliGRAM(s) Oral daily       Interval Events:  - Remains on ECMO circuit, sweep lowered from 4 to 2.5, fi01 lowered to 70%  - Hemoconcentrator placed with 25ml/hr fluid removal    HPI:  62 year old female with a past medical history of afib, and sciatica, who presented to [facility] for shortness of breath. She had gone to [facility] where she had CXR which showed bilateral lower lobe infiltrates so was sent to ER. She had been having exertional dyspnea and hypoxemia (on home pulse ox to 82%). She had been having dyspnea for several months and had a workup in [place] with a CT scan (which was negative for PE). She also states that she was seen by a pulmonologist and rheumatology, where they ruled out lupus and other immunological disorders.    [facility] Hospital Course  Found to be hypoxic and have interstitial lung disease appearance on CT, admitted 8/26 for AHRF, further ILD workup and management. TTE 8/26 with normal LVEF. Pt was started on prednisone on admission with gradually improving O2 requirement and has been stable on 2-3LNC since 9/3. Started steroid taper on 9/6 and fully transitioned to PO 9/8 overnight. Started on Mycophenolate mofetil (cellcept) 9/8 evening but pt reported subjective side effects with weakness. Episode of AF w RVR with HR up to 140s on 9/11 am, decreased to HR 10-110s with IV lopressor 10. CTA negative for PE and showed interval improvements in GGO but possible lingular bleb and RML bronchiectasis. Patient received 2L of but before more fluids and beta-blocker pt developed heart palpitations with EKG HR 170s with SVT. BPs 105/70s. Did not respond to vagal maneuvers. Pt given oral lopressor 12.5 and IV lopressor 5, with improvement of HR to 130s. SBP briefly dropped to 60-70s then recovered. Patient on NRB offered HFNC/BiPAP but refused. Started on empiric vancomycin and zosyn. BCx w/ NGTD. Per pulm increased solumedrol to 40mg qd. Patient acceded to HFNC in which she desatted and presented with increased wob for which she was transitioned to BiPAP. Patient with anxiety while on BiPAP presenting tachypnea and desatting to the 80s despite verbal redirection for which she was administered ativan 1mg IVP x1. Patient distress improved with sats in the low 90s but had desaturation to 70s. STAT CXR showed increased pulmonary congestion for which patient was administered lasix without improvement. Intubated and started on mechanical ventilation but required very high PEEP (18) and 100% FiO2 and still had low saturations. VV ECMO team consulted and accepted to [facility] center. Per signout patient had RV enlargement and dysfunction on POCUS with concern for either new PE vs high pulmonary pressures due to PEEP 18 and lung dysfunction. Patient went for cannulation at [facility] with flouro showing no acute PE. Cannulated with 25 F drainage cannula in R Fem V and 23F “arterial” cannula in RIJ.    (13 Sep 2023 15:24)      REVIEW OF SYSTEMS:  [ ] All other systems negative  [x] Unable to assess ROS because patient is intubated and sedated    OBJECTIVE:  ICU Vital Signs Last 24 Hrs  T(C): 35.4 (16 Sep 2023 04:00), Max: 35.8 (15 Sep 2023 08:00)  T(F): 95.7 (16 Sep 2023 04:00), Max: 96.5 (15 Sep 2023 08:00)  HR: 66 (16 Sep 2023 06:00) (61 - 73)  BP: --  BP(mean): --  ABP: 92/54 (16 Sep 2023 06:00) (91/54 - 125/68)  ABP(mean): 70 (16 Sep 2023 06:00) (70 - 92)  RR: 12 (16 Sep 2023 06:00) (12 - 14)  SpO2: 94% (16 Sep 2023 06:00) (91% - 99%)    O2 Parameters below as of 16 Sep 2023 06:00  Patient On (Oxygen Delivery Method): ventilator    O2 Concentration (%): 40      Mode: AC/ CMV (Assist Control/ Continuous Mandatory Ventilation), RR (machine): 12, FiO2: 40, PEEP: 12, ITime: 1, MAP: 15, PC: 12, PIP: 24    09-14 @ 07:01  -  09-15 @ 07:00  --------------------------------------------------------  IN: 3393.1 mL / OUT: 1765 mL / NET: 1628.1 mL    09-15 @ 07:01  - 09-16 @ 06:31  --------------------------------------------------------  IN: 3238 mL / OUT: 4340 mL / NET: -1102 mL      CAPILLARY BLOOD GLUCOSE      POCT Blood Glucose.: 145 mg/dL (16 Sep 2023 05:50)      Physical Exam:   Constitutional: no acute distress   HEENT: + PERRLA, EOMI, no drainage or redness  Neck: supple, RIJ ECMO cannula, LIJ TLC  Respiratory: Ventilator assisted breath Sounds equal & diminished bilaterally to auscultation, no accessory muscle use noted  Cardiovascular: Sinus fady, regular rate, regular rhythm, normal S1, S2; no murmurs or rub  Gastrointestinal: Soft, non-tender, non distended, no hepatosplenomegaly, normal bowel sounds  Extremities: + 2 peripheral edema, R. Fem ECMO cannula  Vascular: Equal and normal pulses: 2+ peripheral pulses by doppler   Neurological: sedated/intubated  Psychiatric: calm, normal mood, normal affect  Musculoskeletal: No joint swelling or deformity; no limitation of movement  Skin: warm, dry, well perfused, no rashes, right fem cannula site with yellow tissue/slough          [facility] MEDICATIONS:  Standing Meds:  albuterol/ipratropium for Nebulization 3 milliLiter(s) Nebulizer every 6 hours  argatroban Infusion 0.3 MICROgram(s)/kG/Min IV Continuous <Continuous>  chlorhexidine 0.12% Liquid 15 milliLiter(s) Oral Mucosa every 12 hours  chlorhexidine 2% Cloths 1 Application(s) Topical <User Schedule>  cisatracurium Injectable 20 milliGRAM(s) IV Push once  dextrose 5%. 1000 milliLiter(s) IV Continuous <Continuous>  dextrose 5%. 1000 milliLiter(s) IV Continuous <Continuous>  dextrose 50% Injectable 25 Gram(s) IV Push once  dextrose 50% Injectable 12.5 Gram(s) IV Push once  dextrose 50% Injectable 25 Gram(s) IV Push once  fentaNYL    Injectable 100 MICROGram(s) IV Push once  glucagon  Injectable 1 milliGRAM(s) IntraMuscular once  HYDROmorphone Infusion. 1.5 mG/Hr IV Continuous <Continuous>  insulin regular Infusion 3 Unit(s)/Hr IV Continuous <Continuous>  meropenem  IVPB 1000 milliGRAM(s) IV Intermittent every 8 hours  meropenem  IVPB      methylPREDNISolone sodium succinate Injectable 125 milliGRAM(s) IV Push once  multivitamin  Chewable 1 Tablet(s) Chew daily  naloxegol 25 milliGRAM(s) Oral daily  norepinephrine Infusion 0.3 MICROgram(s)/kG/Min IV Continuous <Continuous>  pantoprazole  Injectable 40 milliGRAM(s) IV Push daily  polyethylene glycol 3350 17 Gram(s) Oral <User Schedule>  propofol Infusion 50 MICROgram(s)/kG/Min IV Continuous <Continuous>  senna Syrup 10 milliLiter(s) Oral two times a day  trimethoprim  40 mG/sulfamethoxazole 200 mG Suspension 160 milliGRAM(s) Oral daily      PRN Meds:  dextrose Oral Gel 15 Gram(s) Oral once PRN      LABS:                        12.1   1.25  )-----------( 56       ( 16 Sep 2023 03:20 )             37.0     Hgb Trend: 12.1<--, 12.2<--, 12.2<--, 11.9<--, 11.7<--  09-16    143  |  102  |  48<H>  ----------------------------<  148<H>  3.9   |  31  |  0.95    Ca    8.6      16 Sep 2023 03:20  Phos  2.9     09-16  Mg     2.40     09-16    TPro  4.8<L>  /  Alb  3.1<L>  /  TBili  4.5<H>  /  DBili  x   /  AST  525<H>  /  ALT  992<H>  /  AlkPhos  67  09-16    Creatinine Trend: 0.95<--, 0.93<--, 0.85<--, 0.87<--, 0.83<--, 0.82<--  PT/INR - ( 16 Sep 2023 03:20 )   PT: 24.3 sec;   INR: 2.22 ratio         PTT - ( 16 Sep 2023 03:20 )  PTT:59.9 sec  Urinalysis Basic - ( 16 Sep 2023 03:20 )    Color: x / Appearance: x / SG: x / pH: x  Gluc: 148 mg/dL / Ketone: x  / Bili: x / Urobili: x   Blood: x / Protein: x / Nitrite: x   Leuk Esterase: x / RBC: x / WBC x   Sq Epi: x / Non Sq Epi: x / Bacteria: x      Arterial Blood Gas:  09-16 @ 05:50  7.42/57/78/37/96.1/10.5  ABG lactate: --  Arterial Blood Gas:  09-16 @ 03:20  7.41/59/82/37/96.9/10.7  ABG lactate: --  Arterial Blood Gas:  09-16 @ 02:01  7.38/40/77/24/96.8/-1.3  ABG lactate: --  Arterial Blood Gas:  09-16 @ 00:05  7.46/54/92/38/97.5/12.5  ABG lactate: --  Arterial Blood Gas:  09-15 @ 23:00  7.46/52/95/37/98.3/11.4  ABG lactate: --  Arterial Blood Gas:  09-15 @ 21:36  7.48/49/126/36/99.2/11.3  ABG lactate: --  Arterial Blood Gas:  09-15 @ 18:04  7.45/52/95/36/98.0/10.4  ABG lactate: --  Arterial Blood Gas:  09-15 @ 17:07  7.46/48/95/34/97.9/9.0  ABG lactate: --  Arterial Blood Gas:  09-15 @ 14:58  7.38/61/96/36/97.5/8.9  ABG lactate: --  Arterial Blood Gas:  09-15 @ 08:36  7.40/50/100/31/98.4/5.2  ABG lactate: --  Arterial Blood Gas:  09-15 @ 03:30  7.40/48/104/30/98.6/4.1  ABG lactate: --  Arterial Blood Gas:  09-14 @ 23:36  7.40/45/117/28/99.3/2.6  ABG lactate: --  Arterial Blood Gas:  09-14 @ 17:15  7.35/51/114/28/98.6/1.8  ABG lactate: --  Arterial Blood Gas:  09-14 @ 13:05  7.35/45/125/25/99.4/-1.0  ABG lactate: --  Arterial Blood Gas:  09-14 @ 07:04  7.38/41/124/24/100.0/-0.8  ABG lactate: --        MICROBIOLOGY:     Culture - Blood (collected 13 Sep 2023 17:51)  Source: .Blood Blood-Peripheral  Preliminary Report (15 Sep 2023 22:01):    No growth at 48 Hours    Culture - Blood (collected 13 Sep 2023 17:44)  Source: .Blood Blood  Preliminary Report (15 Sep 2023 22:01):    No growth at 48 Hours    Culture - Urine (collected 13 Sep 2023 17:25)  Source: Catheterized Catheterized  Final Report (14 Sep 2023 15:40):    No growth    Culture - Acid Fast - Bronchial w/Smear (collected 13 Sep 2023 15:45)  Source: .Bronchial Bronchial Lavage    Culture - Bronchial (collected 13 Sep 2023 15:45)  Source: .Bronchial Bronchial Lavage  Gram Stain (14 Sep 2023 07:27):    Rare polymorphonuclear leukocytes seen per low power field    No squamous epithelial cells seen per low power field    No organisms seen per oil power field  Preliminary Report (15 Sep 2023 07:41):    No growth to date    Culture - Fungal, Bronchial (collected 13 Sep 2023 15:45)  Source: .Bronchial Bronchial Lavage  Preliminary Report (14 Sep 2023 14:07):    Testing in progress      RADIOLOGY:  [ ] Reviewed and interpreted by me      Interval Events:    REVIEW OF SYSTEMS:  Unable to assess ROS because pt is intubated and sedated    Vital Signs Last 24 Hrs  T(C): 35.4 (16 Sep 2023 04:00), Max: 35.8 (15 Sep 2023 08:00)  T(F): 95.7 (16 Sep 2023 04:00), Max: 96.5 (15 Sep 2023 08:00)  HR: 66 (16 Sep 2023 06:00) (61 - 73)  BP: --  BP(mean): --  RR: 12 (16 Sep 2023 06:00) (12 - 14)  SpO2: 94% (16 Sep 2023 06:00) (91% - 99%)      Adult Advanced Hemodynamics Last 24 Hrs  CVP(mm Hg): --  CVP(cm H2O): --  CO: --  CI: --  PA: --  PA(mean): --  PCWP: --  SVR: --  SVRI: --  PVR: --  PVRI: --    I&O's Summary    14 Sep 2023 07:01  -  15 Sep 2023 07:00  --------------------------------------------------------  IN: 3393.1 mL / OUT: 1765 mL / NET: 1628.1 mL    15 Sep 2023 07:01  -  16 Sep 2023 06:31  --------------------------------------------------------  IN: 3238 mL / OUT: 4340 mL / NET: -1102 mL          ECMO SETTINGS:  Type:		[ ] Venovenous		[ ] Venoarterial  Cannulation Site(s):     Flow:  	          P1:                  Delta P:  RPM: 		  P2:                  SVO2:    Oxygenator:	Sweep:     L/min	FiO2:     ABG - ( 16 Sep 2023 05:50 )  pH: 7.42  /  pCO2: 57    /  pO2: 78    / HCO3: 37    / Base Excess: 10.5  /  SaO2: 96.1                  VENTILATOR SETTINGS:     Mode: AC/ CMV (Assist Control/ Continuous Mandatory Ventilation)  RR (machine): 12  FiO2: 40  PEEP: 12  ITime: 1  MAP: 15  PC: 12  PIP: 24        PHYSICAL EXAM:  General:   HEENT:   Lymph Nodes:  Neck:   Respiratory:   Cardiovascular:   Abdomen:   Extremities:   Skin:   Neurological:  Psychiatry:      LABS:                          12.1   1.25  )-----------( 56       ( 16 Sep 2023 03:20 )             37.0                          09-16    143  |  102  |  48<H>  ----------------------------<  148<H>  3.9   |  31  |  0.95    Ca    8.6      16 Sep 2023 03:20  Phos  2.9     09-16  Mg     2.40     09-16    TPro  4.8<L>  /  Alb  3.1<L>  /  TBili  4.5<H>  /  DBili  x   /  AST  525<H>  /  ALT  992<H>  /  AlkPhos  67  09-16      LIVER FUNCTIONS - ( 16 Sep 2023 03:20 )  Alb: 3.1 g/dL / Pro: 4.8 g/dL / ALK PHOS: 67 U/L / ALT: 992 U/L / AST: 525 U/L / GGT: x             PT/INR - ( 16 Sep 2023 03:20 )   PT: 24.3 sec;   INR: 2.22 ratio         PTT - ( 16 Sep 2023 03:20 )  PTT:59.9 sec      Culture - Blood (collected 13 Sep 2023 17:51)  Source: .Blood Blood-Peripheral  Preliminary Report (15 Sep 2023 22:01):    No growth at 48 Hours    Culture - Blood (collected 13 Sep 2023 17:44)  Source: .Blood Blood  Preliminary Report (15 Sep 2023 22:01):    No growth at 48 Hours    Culture - Urine (collected 13 Sep 2023 17:25)  Source: Catheterized Catheterized  Final Report (14 Sep 2023 15:40):    No growth    Culture - Acid Fast - Bronchial w/Smear (collected 13 Sep 2023 15:45)  Source: .Bronchial Bronchial Lavage    Culture - Bronchial (collected 13 Sep 2023 15:45)  Source: .Bronchial Bronchial Lavage  Gram Stain (14 Sep 2023 07:27):    Rare polymorphonuclear leukocytes seen per low power field    No squamous epithelial cells seen per low power field    No organisms seen per oil power field  Preliminary Report (15 Sep 2023 07:41):    No growth to date    Culture - Fungal, Bronchial (collected 13 Sep 2023 15:45)  Source: .Bronchial Bronchial Lavage  Preliminary Report (14 Sep 2023 14:07):    Testing in progress          ACTIVE MEDS:    MEDICATIONS  (STANDING):  albuterol/ipratropium for Nebulization 3 milliLiter(s) Nebulizer every 6 hours  argatroban Infusion 0.3 MICROgram(s)/kG/Min (2.3 mL/Hr) IV Continuous <Continuous>  chlorhexidine 0.12% Liquid 15 milliLiter(s) Oral Mucosa every 12 hours  chlorhexidine 2% Cloths 1 Application(s) Topical <User Schedule>  cisatracurium Injectable 20 milliGRAM(s) IV Push once  dextrose 5%. 1000 milliLiter(s) (50 mL/Hr) IV Continuous <Continuous>  dextrose 5%. 1000 milliLiter(s) (100 mL/Hr) IV Continuous <Continuous>  dextrose 50% Injectable 25 Gram(s) IV Push once  dextrose 50% Injectable 12.5 Gram(s) IV Push once  dextrose 50% Injectable 25 Gram(s) IV Push once  fentaNYL    Injectable 100 MICROGram(s) IV Push once  glucagon  Injectable 1 milliGRAM(s) IntraMuscular once  HYDROmorphone Infusion. 1.5 mG/Hr (1.5 mL/Hr) IV Continuous <Continuous>  insulin regular Infusion 3 Unit(s)/Hr (3 mL/Hr) IV Continuous <Continuous>  meropenem  IVPB      meropenem  IVPB 1000 milliGRAM(s) IV Intermittent every 8 hours  methylPREDNISolone sodium succinate Injectable 125 milliGRAM(s) IV Push once  multivitamin  Chewable 1 Tablet(s) Chew daily  naloxegol 25 milliGRAM(s) Oral daily  norepinephrine Infusion 0.3 MICROgram(s)/kG/Min (36.9 mL/Hr) IV Continuous <Continuous>  pantoprazole  Injectable 40 milliGRAM(s) IV Push daily  polyethylene glycol 3350 17 Gram(s) Oral <User Schedule>  propofol Infusion 50 MICROgram(s)/kG/Min (38.3 mL/Hr) IV Continuous <Continuous>  senna Syrup 10 milliLiter(s) Oral two times a day  trimethoprim  40 mG/sulfamethoxazole 200 mG Suspension 160 milliGRAM(s) Oral daily

## 2023-09-16 NOTE — CONSULT NOTE ADULT - ASSESSMENT
62 year old F with no hx of lung disease now presented with SOB. A few months ago she was worked up extensively for dyspnea- neg for lupus and autoimmune markers. Patient transferred from [facility] now on ECMO.   Remains intubated. 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   Consent obtained  from partner Kiara.   On 9/15/23 using the ECMO circuit, PLEX with 3L plasma replacement was performed and well tolerated.  Plan: Trail of 3 procedures every other day.    Follow up: Rheum plan to give Rituxan on 9/16/23. PLEX should be scheduled At least 24 hours after completion of Rituxan to prevent drug removal and reducing efficacy.     Procedure PLEX#2 rescheduled to 9/18/23

## 2023-09-16 NOTE — CHART NOTE - NSCHARTNOTEFT_GEN_A_CORE
: Michi    INDICATION: Shock, Respiratory failure    PROCEDURE:  [X] LIMITED ECHO  [X] LIMITED CHEST  [ ] LIMITED RETROPERITONEAL  [ ] LIMITED ABDOMINAL  [ ] LIMITED DVT  [ ] NEEDLE GUIDANCE VASCULAR  [ ] NEEDLE GUIDANCE THORACENTESIS  [ ] NEEDLE GUIDANCE PARACENTESIS  [ ] NEEDLE GUIDANCE PERICARDIOCENTESIS  [ ] OTHER    FINDINGS: Diffuse B line predominant pattern with irregular pleura. Bibasilar consolidation pattern. Normal LV systolic function. RV smaller than LV in size. LVOT VTI 20.9 cm. IVC 2.3 cm. ECMO drainage cannula tip proximal to hepatic vein inflow      INTERPRETATION: Lung US c/w ARDS. Normal LV systolic function. Improved RV size and systolic function. IVC enlarged. ECMO drainage cannula appropriately positioned.    Images stored on [employer].    Jozef Borden MD  Pulmonary & Critical Care.

## 2023-09-16 NOTE — CONSULT NOTE ADULT - SUBJECTIVE AND OBJECTIVE BOX
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  Transplant pulm consulted for possible transplant eventually.  Per partner at bedside, patient never had lung problems before.  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  No birds at home.  Never smoker, but parents did growing up.  While at [facility], had biopsy wtih ?organizing pneumonia, CT with some bronchiectasis but largely was improving and down to 2 L NC before having worsening requiring intubation/VVECMO.  Was having joint pains for last few months, never had an explanation.  Was told in past she has fatty liver, told to lose weight.    Here, she is on VVECMO and rest vent settings.  Has thrombocytopenia  Unable to assess mental status given sedation.            REVIEW OF SYSTEMS:  unable to obtain given sedation    PAST MEDICAL & SURGICAL HISTORY:      FAMILY HISTORY:      SOCIAL HISTORY:  Smoking: [x ] Never Smoked [ ] Former Smoker (__ packs x ___ years) [ ] Current Smoker  (__ packs x ___ years)  Substance Use: [ ] Never Used [ ] Used ____  EtOH Use:  Marital Status: domestic partner  Sexual History:   Occupation:  Recent Travel:  Country of Birth:  Advance Directives:    Allergies    No Known Allergies    Intolerances        HOME MEDICATIONS:  Home Medications:  gabapentin 100 mg oral capsule: 1 orally 3 times a day (07 Sep 2023 16:58)      OBJECTIVE:  ICU Vital Signs Last 24 Hrs  T(C): 35.6 (16 Sep 2023 08:00), Max: 35.8 (15 Sep 2023 16:00)  T(F): 96.1 (16 Sep 2023 08:00), Max: 96.4 (15 Sep 2023 16:00)  HR: 65 (16 Sep 2023 11:58) (61 - 73)  BP: --  BP(mean): --  ABP: 109/61 (16 Sep 2023 11:58) (91/54 - 125/68)  ABP(mean): 80 (16 Sep 2023 11:58) (70 - 92)  RR: 12 (16 Sep 2023 11:58) (12 - 12)  SpO2: 99% (16 Sep 2023 11:58) (91% - 100%)    O2 Parameters below as of 16 Sep 2023 11:58      O2 Concentration (%): 100      Mode: AC/ CMV (Assist Control/ Continuous Mandatory Ventilation), RR (machine): 12, FiO2: 40, PEEP: 12, ITime: 1, MAP: 15, PC: 12, PIP: 24    09-15 @ 07:01  -  09-16 @ 07:00  --------------------------------------------------------  IN: 3384 mL / OUT: 4475 mL / NET: -1091 mL    09-16 @ 07:01  - 09-16 @ 12:08  --------------------------------------------------------  IN: 761.5 mL / OUT: 755 mL / NET: 6.5 mL      CAPILLARY BLOOD GLUCOSE      POCT Blood Glucose.: 174 mg/dL (16 Sep 2023 12:07)      PHYSICAL EXAM:  GENERAL: NAD, sedated  HEENT:  Atraumatic, Normocephalic  EYES: EOMI, PERRLA,   NECK: Supple, No JVD, R IJ ecmo cannula  CHEST/LUNG: diminished breath sounds anteriorly  HEART: Regular rate and rhythm; No murmurs, rubs, or gallops  ABDOMEN: Soft, Nontender, Nondistended; Bowel sounds present  EXTREMITIES:  + edema LE  PSYCH: unable to assess  NEUROLOGY: unable to assess    LINES:     HOSPITAL MEDICATIONS:  Standing Meds:  albuterol/ipratropium for Nebulization 3 milliLiter(s) Nebulizer every 6 hours  argatroban Infusion 0.3 MICROgram(s)/kG/Min IV Continuous <Continuous>  artificial  tears Solution 1 Drop(s) Both EYES every 12 hours  chlorhexidine 0.12% Liquid 15 milliLiter(s) Oral Mucosa every 12 hours  chlorhexidine 2% Cloths 1 Application(s) Topical <User Schedule>  dextrose 5%. 1000 milliLiter(s) IV Continuous <Continuous>  dextrose 5%. 1000 milliLiter(s) IV Continuous <Continuous>  dextrose 50% Injectable 25 Gram(s) IV Push once  dextrose 50% Injectable 12.5 Gram(s) IV Push once  dextrose 50% Injectable 25 Gram(s) IV Push once  glucagon  Injectable 1 milliGRAM(s) IntraMuscular once  HYDROmorphone Infusion. 1.5 mG/Hr IV Continuous <Continuous>  insulin regular Infusion 3 Unit(s)/Hr IV Continuous <Continuous>  meropenem  IVPB      meropenem  IVPB 1000 milliGRAM(s) IV Intermittent every 8 hours  methylPREDNISolone sodium succinate Injectable 125 milliGRAM(s) IV Push daily  metoclopramide Injectable 5 milliGRAM(s) IV Push every 8 hours  multivitamin  Chewable 1 Tablet(s) Chew daily  naloxegol 25 milliGRAM(s) Oral daily  norepinephrine Infusion 0.3 MICROgram(s)/kG/Min IV Continuous <Continuous>  pantoprazole  Injectable 40 milliGRAM(s) IV Push daily  polyethylene glycol 3350 17 Gram(s) Oral <User Schedule>  propofol Infusion 50 MICROgram(s)/kG/Min IV Continuous <Continuous>  senna Syrup 10 milliLiter(s) Oral two times a day  trimethoprim  40 mG/sulfamethoxazole 200 mG Suspension 160 milliGRAM(s) Oral daily      PRN Meds:  dextrose Oral Gel 15 Gram(s) Oral once PRN  diphenhydrAMINE Injectable 50 milliGRAM(s) IV Push once PRN  meperidine     Injectable 25 milliGRAM(s) IV Push once PRN      LABS:                        12.1   0.98  )-----------( 48       ( 16 Sep 2023 09:45 )             37.6     Hgb Trend: 12.1<--, 12.1<--, 12.2<--, 12.2<--, 11.9<--  09-16    146<H>  |  103  |  51<H>  ----------------------------<  176<H>  4.1   |  34<H>  |  0.91    Ca    8.7      16 Sep 2023 09:45  Phos  3.3     09-16  Mg     2.60     09-16    TPro  5.1<L>  /  Alb  3.1<L>  /  TBili  5.0<H>  /  DBili  x   /  AST  508<H>  /  ALT  970<H>  /  AlkPhos  76  09-16    Creatinine Trend: 0.91<--, 0.95<--, 0.93<--, 0.85<--, 0.87<--, 0.83<--  PT/INR - ( 16 Sep 2023 09:45 )   PT: 28.9 sec;   INR: 2.65 ratio         PTT - ( 16 Sep 2023 09:45 )  PTT:68.6 sec  Urinalysis Basic - ( 16 Sep 2023 09:45 )    Color: x / Appearance: x / SG: x / pH: x  Gluc: 176 mg/dL / Ketone: x  / Bili: x / Urobili: x   Blood: x / Protein: x / Nitrite: x   Leuk Esterase: x / RBC: x / WBC x   Sq Epi: x / Non Sq Epi: x / Bacteria: x      Arterial Blood Gas:  09-16 @ 09:45  7.38/64/124/38/97.8/10.4  ABG lactate: --  Arterial Blood Gas:  09-16 @ 05:50  7.42/57/78/37/96.1/10.5  ABG lactate: --  Arterial Blood Gas:  09-16 @ 03:20  7.41/59/82/37/96.9/10.7  ABG lactate: --  Arterial Blood Gas:  09-16 @ 02:01  7.38/40/77/24/96.8/-1.3  ABG lactate: --  Arterial Blood Gas:  09-16 @ 00:05  7.46/54/92/38/97.5/12.5  ABG lactate: --  Arterial Blood Gas:  09-15 @ 23:00  7.46/52/95/37/98.3/11.4  ABG lactate: --  Arterial Blood Gas:  09-15 @ 21:36  7.48/49/126/36/99.2/11.3  ABG lactate: --  Arterial Blood Gas:  09-15 @ 18:04  7.45/52/95/36/98.0/10.4  ABG lactate: --  Arterial Blood Gas:  09-15 @ 17:07  7.46/48/95/34/97.9/9.0  ABG lactate: --  Arterial Blood Gas:  09-15 @ 14:58  7.38/61/96/36/97.5/8.9  ABG lactate: --  Arterial Blood Gas:  09-15 @ 08:36  7.40/50/100/31/98.4/5.2  ABG lactate: --  Arterial Blood Gas:  09-15 @ 03:30  7.40/48/104/30/98.6/4.1  ABG lactate: --  Arterial Blood Gas:  09-14 @ 23:36  7.40/45/117/28/99.3/2.6  ABG lactate: --  Arterial Blood Gas:  09-14 @ 17:15  7.35/51/114/28/98.6/1.8  ABG lactate: --  Arterial Blood Gas:  09-14 @ 13:05  7.35/45/125/25/99.4/-1.0  ABG lactate: --        MICROBIOLOGY:     Culture - Blood (collected 13 Sep 2023 17:51)  Source: .Blood Blood-Peripheral  Preliminary Report (15 Sep 2023 22:01):    No growth at 48 Hours    Culture - Blood (collected 13 Sep 2023 17:44)  Source: .Blood Blood  Preliminary Report (15 Sep 2023 22:01):    No growth at 48 Hours    Culture - Urine (collected 13 Sep 2023 17:25)  Source: Catheterized Catheterized  Final Report (14 Sep 2023 15:40):    No growth    Culture - Acid Fast - Bronchial w/Smear (collected 13 Sep 2023 15:45)  Source: .Bronchial Bronchial Lavage    Culture - Bronchial (collected 13 Sep 2023 15:45)  Source: .Bronchial Bronchial Lavage  Gram Stain (14 Sep 2023 07:27):    Rare polymorphonuclear leukocytes seen per low power field    No squamous epithelial cells seen per low power field    No organisms seen per oil power field  Final Report (16 Sep 2023 11:39):    No growth    Culture - Fungal, Bronchial (collected 13 Sep 2023 15:45)  Source: .Bronchial Bronchial Lavage  Preliminary Report (14 Sep 2023 14:07):    Testing in progress        RADIOLOGY:  [x ] Reviewed and interpreted by me  ct chest 8/26: reticulation bilaterally some GGO  cxr bilateral opacities/consolidations

## 2023-09-16 NOTE — PROGRESS NOTE ADULT - ATTENDING COMMENTS
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    Patient was intially admitted to [facility] on 8/26 after p/w SOB and found to be hypoxemic. Of note, as per chart review and wife, patient has had SOB  for several months. They stayed in [place] from 11/2022 to 8/20/2023 and while there, patient noted onset of chronic SOB several months ago. Also endorse onset of debilitating b/l hand cramps and some muscles weakness several months as well. Patient went to see a neurologist and had an MRI which reportedly showed cervical spine issues for which she was recently started on Prednisone shortly before admission. Also endorses single episode of painful rash on her shins, ears and neck and chest which resolved with a steroid cream from a dermatologist.     During admission at [facility], patient underwent bronchoscopy with TBBX on 8/30 which showed NSIP/OP. Labs notable for positive ARIANA 1:1280, RNP > 8, Alejandro > 8. Patient was started on Solumedrol 60mg q6h from 9/1 to 9/6 then weaned over time to then Medrol 32 mg 9/9 to 9/12. Patient was also on Cellcept 9/8 to 9/11 but was stopped due to tachycardia. During this time, patient was on 2 to 4 L nc. Interval CTA chest on 9/11 also showed improved in reticular findings and diffuse GGO. On 9/12 early AM, patient had episode of SVT to 170s with then rapidly progressive hypoxemic respiratory failure leading to intubation this AM 9/13. Patient was restarted on empiric abx with Vanc and Zosyn on 9/12. Despite best practices, patient remamined hypoxemic and patient was cannulated for VV-ECMO on 9/13 and transferred to [facility].    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 Differential includes bacterial PNA, atypical PNA, aspiration. Angiogram during cannulation 9/13 negative for PE  #VV-ECMO  #Afib w/ RVR - Spontaneous cardioversion on Amio gtt  #Oropharyngeal dysphagia  #Hyperglycemia  - c/w vasopressors to maintain MAP > 65. Vasopressors continue to improve off milrinone stopped shortly after arrival, now only on Levo. Will monitor RV size and function with serial TTE/RITCHIE. Still mild to moderate RV enlargement but improved RV systolic function  - Spontaneous cardioversion to sinus s/p amiodarone gtt. No MEREDITH thrombus on RITCHIE on 9/13. Monitor on tele  - c/w sedation, currently on Dilaudid and Propofol. Paralytics if unable to keep concordant with vent. Daily sedation vacation  - c/w mechanical ventilatory support, currently on rest settings PC RR 12, PS 12, PEEP 12, 100%, TV slightly improved to about 70 ccs today  - Weaned NO off yesterday, with close monitoring of RV size and function  - c/w VV-ECMO support, 25 Fr RIJ and 19Fr RFem. Pull cannula is appropriately positioned after pull back on 9/14. Venous SO2 high 70s to low 80s. Currently on Sweep 4, Flow 3.8.  - f/u BAL cyto, cell count, cultures, PCP PCR, fungitell, aspergillus  - f/u blood and urine cultures. MRSA PCR negative, urine legionella negative. RVP negative  - c/w empiric Wilmer, plan for 7 day course if cultures negative  - s/p pulse dose steroids x 3 days, now on 1mg/kg daily. s/p PLEX x1 on 9/15. Now getting Rituxan today. Discussed with blood bank attending, will plan for next PLEX for Monday AM to allow Rituxan to work. Will touch base with perfusion. f/u Rheum recs  - f/u additional Rheum labs re APLS  - Trend LFTs for shock liver, peaked but T bili/D bili still elevated. CTAP and RUQ US negative for hepatobiliary pathology. Acute hepatitis panel negative  - c/w Argatroban gtt, tranfuse as needed. Trend LDH and haptoglobin  - c/w insulin gtt for persistent hyperglycemia, improved  - Hemoconcentrator for goal net negative 1 to 12 L today  - c/w bowel regimen, add Reglan x 24 hours  - c/w PT/OT for passive ROM exercises  - Palliative eval appreciated    #Leukopenia - Unclear etiology.  #Thrombocytopenia  - Monitor CBC, trend  - Will repeat blood cultures in case infectious  - Heme consult  #GOC - Full Code    #DVT ppx - Argatroban gtt    #POCUS - See POCUS note    Total time spent on VV ECMO management was 30 minutes, separate from time spent on critical care management, procedures, and teaching.    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 from [facility] for acute hypoxemic and hypercapnic respiratory failure requiring mechanical ventilatory and VV-ECMO.    Patient was intially admitted to [facility] on 8/26 after p/w SOB and found to be hypoxemic. Of note, as per chart review and wife, patient has had SOB  for several months. They stayed in [place] from 11/2022 to 8/20/2023 and while there, patient noted onset of chronic SOB several months ago. Also endorse onset of debilitating b/l hand cramps and some muscles weakness several months as well. Patient went to see a neurologist and had an MRI which reportedly showed cervical spine issues for which she was recently started on Prednisone shortly before admission. Also endorses single episode of painful rash on her shins, ears and neck and chest which resolved with a steroid cream from a dermatologist.     During admission at [facility], patient underwent bronchoscopy with TBBX on 8/30 which showed NSIP/OP. Labs notable for positive ARIANA 1:1280, RNP > 8, Alejandro > 8. Patient was started on Solumedrol 60mg q6h from 9/1 to 9/6 then weaned over time to then Medrol 32 mg 9/9 to 9/12. Patient was also on Cellcept 9/8 to 9/11 but was stopped due to tachycardia. During this time, patient was on 2 to 4 L nc. Interval CTA chest on 9/11 also showed improved in reticular findings and diffuse GGO. On 9/12 early AM, patient had episode of SVT to 170s with then rapidly progressive hypoxemic respiratory failure leading to intubation this AM 9/13. Patient was restarted on empiric abx with Vanc and Zosyn on 9/12. Despite best practices, patient remamined hypoxemic and patient was cannulated for VV-ECMO on 9/13 and transferred to [facility].    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 Differential includes bacterial PNA, atypical PNA, aspiration. Angiogram during cannulation 9/13 negative for PE  #VV-ECMO  #Afib w/ RVR - Spontaneous cardioversion on Amio gtt  #Oropharyngeal dysphagia  #Hyperglycemia  - c/w vasopressors to maintain MAP > 65. Vasopressors continue to improve off milrinone stopped shortly after arrival, now only on Levo. Will monitor RV size and function with serial TTE/RITCHIE. Still mild to moderate RV enlargement but improved RV systolic function  - Spontaneous cardioversion to sinus s/p amiodarone gtt. No MEREDITH thrombus on RITCHIE on 9/13. Monitor on tele  - c/w sedation, currently on Dilaudid and Propofol. Paralytics if unable to keep concordant with vent. Daily sedation vacation  - c/w mechanical ventilatory support, currently on rest settings PC RR 12, PS 12, PEEP 12, 100%, TV slightly improved to about 70 ccs today  - Weaned NO off yesterday, with close monitoring of RV size and function  - c/w VV-ECMO support, 25 Fr RIJ and 19Fr RFem. Pull cannula is appropriately positioned after pull back on 9/14. Venous SO2 high 70s to low 80s. Currently on Sweep 4, Flow 3.8.  - f/u BAL cyto, cell count, cultures, PCP PCR, fungitell, aspergillus  - f/u blood and urine cultures. MRSA PCR negative, urine legionella negative. RVP negative  - c/w empiric Wilmer, plan for 7 day course if cultures negative  - Will change bactrim ppx to mepron due to bicytopenia  - s/p pulse dose steroids x 3 days, now on 1mg/kg daily. s/p PLEX x1 on 9/15. Now getting Rituxan today. Discussed with blood bank attending, will plan for next PLEX for Monday AM to allow Rituxan to work. Will touch base with perfusion. f/u Rheum recs  - f/u additional Rheum labs re APLS  - Trend LFTs for shock liver, peaked but T bili/D bili still elevated. CTAP and RUQ US negative for hepatobiliary pathology. Acute hepatitis panel negative  - c/w Argatroban gtt, tranfuse as needed. Trend LDH and haptoglobin  - c/w insulin gtt for persistent hyperglycemia, improved  - Hemoconcentrator for goal net negative 1 to 12 L today  - c/w bowel regimen, add Reglan x 24 hours  - c/w PT/OT for passive ROM exercises  - Palliative eval appreciated    #Leukopenia - Unclear etiology.  #Thrombocytopenia  - Monitor CBC, trend  - Will repeat blood cultures in case infectious  - Heme consult  - Will change bactrim ppx to mepron    #GOC - Full Code    #DVT ppx - Argatroban gtt    #POCUS - See POCUS note    Total time spent on VV ECMO management was 30 minutes, separate from time spent on critical care management, procedures, and teaching.    Jozef Borden MD  Pulmonary & Critical Care

## 2023-09-16 NOTE — CONSULT NOTE ADULT - ASSESSMENT
62 F with afib and sciatica presented to [facility] in August wtih SOB, found to have likely MCTD-ILD with acute hypoxemic respiratory failure, improved with steroids/cellcept c/b afib/flash pulmonary edema/worsening hypoxemia requiring intubation and VVECMO for ARDS on 9/13, tx to [facility].    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, started on rituxan/plex/steroids    # pretransplant evaluation for lung transplant  # ARDS  # acute hypoxemic respiratory failure  - at this time, patient would not be a lung transplant candidate given acute medical conditions (ARDS, DAH, cytopenias), as well as inability to assess mental status  - BMI at this time is 44, as well as reported history of fatty liver, both of which may be issues for possible transplantation  - would continue with ARDS and DAH management    Explained to partner at beside, Kiara, what lung transplant is, who does and does not qualify for transplant, what the workup is like, survival rates, risks/benefits of lung transplantation.  All her questions answered.  She is hopeful that patient will improve on her own.    At this time, given her acute medical conditions, she is not a candidate to start a formal lung transplant evaluation.  Her partner is in agreement with this and understands.  Medical team to call lung transplant team back once patient improves. No

## 2023-09-16 NOTE — CONSULT NOTE ADULT - SUBJECTIVE AND OBJECTIVE BOX
Hematology Consult Note  ***recs not final until attending attestation***    Kevin Lucero MD PGY-5  Reachable on TEAMS    HPI:  62 year old female with a past medical history of afib, and sciatica, who presented to [facility] for shortness of breath. She had gone to [facility] where she had CXR which showed bilateral lower lobe infiltrates so was sent to ER. She had been having exertional dyspnea and hypoxemia (on home pulse ox to 82%). She had been having dyspnea for several months and had a workup in [place] with a CT scan (which was negative for PE). She also states that she was seen by a pulmonologist and rheumatology, where they ruled out lupus and other immunological disorders.    [facility] Hospital Course  Found to be hypoxic and have interstitial lung disease appearance on CT, admitted 8/26 for AHRF, further ILD workup and management. TTE 8/26 with normal LVEF. Pt was started on prednisone on admission with gradually improving O2 requirement and has been stable on 2-3LNC since 9/3. Started steroid taper on 9/6 and fully transitioned to PO 9/8 overnight. Started on Mycophenolate mofetil (cellcept) 9/8 evening but pt reported subjective side effects with weakness. Episode of AF w RVR with HR up to 140s on 9/11 am, decreased to HR 10-110s with IV lopressor 10. CTA negative for PE and showed interval improvements in GGO but possible lingular bleb and RML bronchiectasis. Patient received 2L of but before more fluids and beta-blocker pt developed heart palpitations with EKG HR 170s with SVT. BPs 105/70s. Did not respond to vagal maneuvers. Pt given oral lopressor 12.5 and IV lopressor 5, with improvement of HR to 130s. SBP briefly dropped to 60-70s then recovered. Patient on NRB offered HFNC/BiPAP but refused. Started on empiric vancomycin and zosyn. BCx w/ NGTD. Per pulm increased solumedrol to 40mg qd. Patient acceded to HFNC in which she desatted and presented with increased wob for which she was transitioned to BiPAP. Patient with anxiety while on BiPAP presenting tachypnea and desatting to the 80s despite verbal redirection for which she was administered ativan 1mg IVP x1. Patient distress improved with sats in the low 90s but had desaturation to 70s. STAT CXR showed increased pulmonary congestion for which patient was administered lasix without improvement. Intubated and started on mechanical ventilation but required very high PEEP (18) and 100% FiO2 and still had low saturations. VV ECMO team consulted and accepted to [facility]. Per signout patient had RV enlargement and dysfunction on POCUS with concern for either new PE vs high pulmonary pressures due to PEEP 18 and lung dysfunction. Patient went for cannulation at [facility] with flouro showing no acute PE. Cannulated with 25 F drainage cannula in R Fem V and 23F “arterial” cannula in RIJ.     Patient seen and examined w/ partner at bedside. Intubated, sedated, on ECMO.       Allergies    No Known Allergies    Intolerances        MEDICATIONS  (STANDING):  albuterol/ipratropium for Nebulization 3 milliLiter(s) Nebulizer every 6 hours  argatroban Infusion 0.3 MICROgram(s)/kG/Min (2.3 mL/Hr) IV Continuous <Continuous>  artificial  tears Solution 1 Drop(s) Both EYES every 12 hours  chlorhexidine 0.12% Liquid 15 milliLiter(s) Oral Mucosa every 12 hours  chlorhexidine 2% Cloths 1 Application(s) Topical <User Schedule>  dextrose 5%. 1000 milliLiter(s) (50 mL/Hr) IV Continuous <Continuous>  dextrose 5%. 1000 milliLiter(s) (100 mL/Hr) IV Continuous <Continuous>  dextrose 50% Injectable 25 Gram(s) IV Push once  dextrose 50% Injectable 12.5 Gram(s) IV Push once  dextrose 50% Injectable 25 Gram(s) IV Push once  glucagon  Injectable 1 milliGRAM(s) IntraMuscular once  HYDROmorphone Infusion. 1.5 mG/Hr (1.5 mL/Hr) IV Continuous <Continuous>  insulin regular Infusion 3 Unit(s)/Hr (3 mL/Hr) IV Continuous <Continuous>  meropenem  IVPB 1000 milliGRAM(s) IV Intermittent every 8 hours  meropenem  IVPB      methylPREDNISolone sodium succinate Injectable 125 milliGRAM(s) IV Push daily  metoclopramide Injectable 5 milliGRAM(s) IV Push every 8 hours  multivitamin/minerals/iron Oral Solution (CENTRUM) 15 milliLiter(s) Oral daily  naloxegol 25 milliGRAM(s) Oral daily  norepinephrine Infusion 0.3 MICROgram(s)/kG/Min (36.9 mL/Hr) IV Continuous <Continuous>  pantoprazole  Injectable 40 milliGRAM(s) IV Push daily  polyethylene glycol 3350 17 Gram(s) Oral <User Schedule>  propofol Infusion 50 MICROgram(s)/kG/Min (38.3 mL/Hr) IV Continuous <Continuous>  senna Syrup 10 milliLiter(s) Oral two times a day    MEDICATIONS  (PRN):  dextrose Oral Gel 15 Gram(s) Oral once PRN Blood Glucose LESS THAN 70 milliGRAM(s)/deciliter  diphenhydrAMINE Injectable 50 milliGRAM(s) IV Push once PRN chemotherapy reaction  meperidine     Injectable 25 milliGRAM(s) IV Push once PRN chemotherapy reaction      PAST MEDICAL & SURGICAL HISTORY:      FAMILY HISTORY:      SOCIAL HISTORY: no tobacco use    REVIEW OF SYSTEMS:  All other systems were reviewed and are negative, except as noted        T(F): 96.1 (09-16-23 @ 16:00), Max: 96.2 (09-16-23 @ 00:00)  HR: 74 (09-16-23 @ 16:00)  BP: --  RR: 12 (09-16-23 @ 16:00)  SpO2: 100% (09-16-23 @ 16:00)  Wt(kg): --    Constitutional: NAD  Eyes: EOMI, sclera non-icteric  Neck: supple  Respiratory: intubated on ecmo  Cardiovascular: RRR,   Gastrointestinal: soft, NTND, no masses palpable,  Extremities: no cyanosis, no clubbing                          12.1   0.50  )-----------( 47       ( 16 Sep 2023 15:30 )             37.5       09-16    146<H>  |  103  |  56<H>  ----------------------------<  206<H>  4.1   |  32<H>  |  0.90    Ca    8.5      16 Sep 2023 15:30  Phos  3.5     09-16  Mg     2.60     09-16    TPro  4.8<L>  /  Alb  2.9<L>  /  TBili  5.3<H>  /  DBili  x   /  AST  431<H>  /  ALT  1030<H>  /  AlkPhos  74  09-16      Magnesium: 2.60 mg/dL (09-16 @ 15:30)  Phosphorus: 3.5 mg/dL (09-16 @ 15:30)  Magnesium: 2.60 mg/dL (09-16 @ 09:45)  Phosphorus: 3.3 mg/dL (09-16 @ 09:45)  Haptoglobin, Serum: <20 mg/dL (09-16 @ 03:20)  Lactate Dehydrogenase, Serum: 775 U/L (09-16 @ 03:20)  Magnesium: 2.40 mg/dL (09-16 @ 03:20)  Phosphorus: 2.9 mg/dL (09-16 @ 03:20)  Phosphorus: 2.4 mg/dL (09-15 @ 21:09)  Magnesium: 2.40 mg/dL (09-15 @ 21:09)      RADIOLOGY & ADDITIONAL TESTS:  Studies reviewed.

## 2023-09-17 LAB
ALBUMIN SERPL ELPH-MCNC: 2.8 G/DL — LOW (ref 3.3–5)
ALBUMIN SERPL ELPH-MCNC: 2.9 G/DL — LOW (ref 3.3–5)
ALBUMIN SERPL ELPH-MCNC: 3 G/DL — LOW (ref 3.3–5)
ALBUMIN SERPL ELPH-MCNC: 3.2 G/DL — LOW (ref 3.3–5)
ALBUMIN SERPL ELPH-MCNC: 3.2 G/DL — LOW (ref 3.3–5)
ALP SERPL-CCNC: 79 U/L — SIGNIFICANT CHANGE UP (ref 40–120)
ALP SERPL-CCNC: 79 U/L — SIGNIFICANT CHANGE UP (ref 40–120)
ALP SERPL-CCNC: 83 U/L — SIGNIFICANT CHANGE UP (ref 40–120)
ALP SERPL-CCNC: 85 U/L — SIGNIFICANT CHANGE UP (ref 40–120)
ALP SERPL-CCNC: 91 U/L — SIGNIFICANT CHANGE UP (ref 40–120)
ALT FLD-CCNC: 1069 U/L — HIGH (ref 4–33)
ALT FLD-CCNC: 867 U/L — HIGH (ref 4–33)
ALT FLD-CCNC: 870 U/L — HIGH (ref 4–33)
ALT FLD-CCNC: 930 U/L — HIGH (ref 4–33)
ALT FLD-CCNC: 994 U/L — HIGH (ref 4–33)
ANION GAP SERPL CALC-SCNC: 12 MMOL/L — SIGNIFICANT CHANGE UP (ref 7–14)
ANION GAP SERPL CALC-SCNC: 12 MMOL/L — SIGNIFICANT CHANGE UP (ref 7–14)
ANION GAP SERPL CALC-SCNC: 5 MMOL/L — LOW (ref 7–14)
ANION GAP SERPL CALC-SCNC: 8 MMOL/L — SIGNIFICANT CHANGE UP (ref 7–14)
ANION GAP SERPL CALC-SCNC: 8 MMOL/L — SIGNIFICANT CHANGE UP (ref 7–14)
APTT BLD: 56.1 SEC — HIGH (ref 24.5–35.6)
APTT BLD: 60.1 SEC — HIGH (ref 24.5–35.6)
APTT BLD: 66.8 SEC — HIGH (ref 24.5–35.6)
APTT BLD: 67.7 SEC — HIGH (ref 24.5–35.6)
APTT BLD: 71.8 SEC — HIGH (ref 24.5–35.6)
AST SERPL-CCNC: 283 U/L — HIGH (ref 4–32)
AST SERPL-CCNC: 304 U/L — HIGH (ref 4–32)
AST SERPL-CCNC: 340 U/L — HIGH (ref 4–32)
AST SERPL-CCNC: 348 U/L — HIGH (ref 4–32)
AST SERPL-CCNC: 387 U/L — HIGH (ref 4–32)
BASE EXCESS BLDCOV CALC-SCNC: 11.6 MMOL/L — HIGH (ref -3–2)
BASOPHILS # BLD AUTO: 0 K/UL — SIGNIFICANT CHANGE UP (ref 0–0.2)
BASOPHILS NFR BLD AUTO: 0 % — SIGNIFICANT CHANGE UP (ref 0–2)
BILIRUB SERPL-MCNC: 6.6 MG/DL — HIGH (ref 0.2–1.2)
BILIRUB SERPL-MCNC: 6.8 MG/DL — HIGH (ref 0.2–1.2)
BILIRUB SERPL-MCNC: 7.2 MG/DL — HIGH (ref 0.2–1.2)
BILIRUB SERPL-MCNC: 7.4 MG/DL — HIGH (ref 0.2–1.2)
BILIRUB SERPL-MCNC: 8.3 MG/DL — HIGH (ref 0.2–1.2)
BLOOD GAS ARTERIAL COMPREHENSIVE RESULT: SIGNIFICANT CHANGE UP
BLOOD GAS ECMO POST MEMBRANE - ARTERIAL RESULT: SIGNIFICANT CHANGE UP
BLOOD GAS ECMO PRE MEMBRANE - VENOUS RESULT: SIGNIFICANT CHANGE UP
BLOOD GAS PRE MEMBRANE - GLUCOSE: 196 MG/DL — HIGH (ref 70–99)
BLOOD GAS PRE MEMBRANE - ICALCIUM: 1.1 MMOL/L — LOW (ref 1.15–1.29)
BLOOD GAS PRE MEMBRANE - POTASSIUM: 4.5 MMOL/L — SIGNIFICANT CHANGE UP (ref 3.4–4.5)
BLOOD GAS PRE MEMBRANE - SODIUM: 143 MMOL/L — SIGNIFICANT CHANGE UP (ref 136–146)
BUN SERPL-MCNC: 49 MG/DL — HIGH (ref 7–23)
BUN SERPL-MCNC: 50 MG/DL — HIGH (ref 7–23)
BUN SERPL-MCNC: 52 MG/DL — HIGH (ref 7–23)
BUN SERPL-MCNC: 54 MG/DL — HIGH (ref 7–23)
BUN SERPL-MCNC: 54 MG/DL — HIGH (ref 7–23)
CALCIUM SERPL-MCNC: 8.4 MG/DL — SIGNIFICANT CHANGE UP (ref 8.4–10.5)
CALCIUM SERPL-MCNC: 8.5 MG/DL — SIGNIFICANT CHANGE UP (ref 8.4–10.5)
CHLORIDE SERPL-SCNC: 104 MMOL/L — SIGNIFICANT CHANGE UP (ref 98–107)
CHLORIDE SERPL-SCNC: 105 MMOL/L — SIGNIFICANT CHANGE UP (ref 98–107)
CHLORIDE SERPL-SCNC: 105 MMOL/L — SIGNIFICANT CHANGE UP (ref 98–107)
CHLORIDE SERPL-SCNC: 106 MMOL/L — SIGNIFICANT CHANGE UP (ref 98–107)
CHLORIDE SERPL-SCNC: 107 MMOL/L — SIGNIFICANT CHANGE UP (ref 98–107)
CO2 SERPL-SCNC: 29 MMOL/L — SIGNIFICANT CHANGE UP (ref 22–31)
CO2 SERPL-SCNC: 30 MMOL/L — SIGNIFICANT CHANGE UP (ref 22–31)
CO2 SERPL-SCNC: 34 MMOL/L — HIGH (ref 22–31)
CO2 SERPL-SCNC: 35 MMOL/L — HIGH (ref 22–31)
CO2 SERPL-SCNC: 38 MMOL/L — HIGH (ref 22–31)
COHGB MFR BLDA: 2.6 % — HIGH (ref 0.5–1.5)
CREAT SERPL-MCNC: 0.55 MG/DL — SIGNIFICANT CHANGE UP (ref 0.5–1.3)
CREAT SERPL-MCNC: 0.65 MG/DL — SIGNIFICANT CHANGE UP (ref 0.5–1.3)
CREAT SERPL-MCNC: 0.71 MG/DL — SIGNIFICANT CHANGE UP (ref 0.5–1.3)
CREAT SERPL-MCNC: 0.73 MG/DL — SIGNIFICANT CHANGE UP (ref 0.5–1.3)
CREAT SERPL-MCNC: 0.8 MG/DL — SIGNIFICANT CHANGE UP (ref 0.5–1.3)
CULTURE RESULTS: SIGNIFICANT CHANGE UP
CULTURE RESULTS: SIGNIFICANT CHANGE UP
EGFR: 102 ML/MIN/1.73M2 — SIGNIFICANT CHANGE UP
EGFR: 82 ML/MIN/1.73M2 — SIGNIFICANT CHANGE UP
EGFR: 92 ML/MIN/1.73M2 — SIGNIFICANT CHANGE UP
EGFR: 95 ML/MIN/1.73M2 — SIGNIFICANT CHANGE UP
EGFR: 98 ML/MIN/1.73M2 — SIGNIFICANT CHANGE UP
EOSINOPHIL # BLD AUTO: 0 K/UL — SIGNIFICANT CHANGE UP (ref 0–0.5)
EOSINOPHIL NFR BLD AUTO: 0 % — SIGNIFICANT CHANGE UP (ref 0–6)
FIBRINOGEN PPP-MCNC: 250 MG/DL — SIGNIFICANT CHANGE UP (ref 200–465)
FIBRINOGEN PPP-MCNC: 258 MG/DL — SIGNIFICANT CHANGE UP (ref 200–465)
FOLATE SERPL-MCNC: 6.2 NG/ML — SIGNIFICANT CHANGE UP (ref 3.1–17.5)
GAS PNL BLDA: SIGNIFICANT CHANGE UP
GLUCOSE BLDC GLUCOMTR-MCNC: 113 MG/DL — HIGH (ref 70–99)
GLUCOSE BLDC GLUCOMTR-MCNC: 120 MG/DL — HIGH (ref 70–99)
GLUCOSE BLDC GLUCOMTR-MCNC: 134 MG/DL — HIGH (ref 70–99)
GLUCOSE BLDC GLUCOMTR-MCNC: 139 MG/DL — HIGH (ref 70–99)
GLUCOSE BLDC GLUCOMTR-MCNC: 143 MG/DL — HIGH (ref 70–99)
GLUCOSE BLDC GLUCOMTR-MCNC: 149 MG/DL — HIGH (ref 70–99)
GLUCOSE BLDC GLUCOMTR-MCNC: 150 MG/DL — HIGH (ref 70–99)
GLUCOSE BLDC GLUCOMTR-MCNC: 151 MG/DL — HIGH (ref 70–99)
GLUCOSE BLDC GLUCOMTR-MCNC: 157 MG/DL — HIGH (ref 70–99)
GLUCOSE BLDC GLUCOMTR-MCNC: 157 MG/DL — HIGH (ref 70–99)
GLUCOSE BLDC GLUCOMTR-MCNC: 163 MG/DL — HIGH (ref 70–99)
GLUCOSE BLDC GLUCOMTR-MCNC: 166 MG/DL — HIGH (ref 70–99)
GLUCOSE BLDC GLUCOMTR-MCNC: 170 MG/DL — HIGH (ref 70–99)
GLUCOSE BLDC GLUCOMTR-MCNC: 171 MG/DL — HIGH (ref 70–99)
GLUCOSE BLDC GLUCOMTR-MCNC: 177 MG/DL — HIGH (ref 70–99)
GLUCOSE BLDC GLUCOMTR-MCNC: 180 MG/DL — HIGH (ref 70–99)
GLUCOSE BLDC GLUCOMTR-MCNC: 182 MG/DL — HIGH (ref 70–99)
GLUCOSE BLDC GLUCOMTR-MCNC: 184 MG/DL — HIGH (ref 70–99)
GLUCOSE BLDC GLUCOMTR-MCNC: 195 MG/DL — HIGH (ref 70–99)
GLUCOSE BLDC GLUCOMTR-MCNC: 197 MG/DL — HIGH (ref 70–99)
GLUCOSE BLDC GLUCOMTR-MCNC: 197 MG/DL — HIGH (ref 70–99)
GLUCOSE SERPL-MCNC: 155 MG/DL — HIGH (ref 70–99)
GLUCOSE SERPL-MCNC: 156 MG/DL — HIGH (ref 70–99)
GLUCOSE SERPL-MCNC: 202 MG/DL — HIGH (ref 70–99)
GLUCOSE SERPL-MCNC: 208 MG/DL — HIGH (ref 70–99)
GLUCOSE SERPL-MCNC: 213 MG/DL — HIGH (ref 70–99)
HAPTOGLOB SERPL-MCNC: <20 MG/DL — LOW (ref 34–200)
HCO3, PRE MEMBRANE VENOUS: 38 MMOL/L — HIGH (ref 20–27)
HCT VFR BLD CALC: 34.6 % — SIGNIFICANT CHANGE UP (ref 34.5–45)
HCT VFR BLD CALC: 36.3 % — SIGNIFICANT CHANGE UP (ref 34.5–45)
HCT VFR BLD CALC: 36.9 % — SIGNIFICANT CHANGE UP (ref 34.5–45)
HCT VFR BLD CALC: 36.9 % — SIGNIFICANT CHANGE UP (ref 34.5–45)
HGB BLD-MCNC: 11.2 G/DL — LOW (ref 11.5–15.5)
HGB BLD-MCNC: 11.5 G/DL — SIGNIFICANT CHANGE UP (ref 11.5–15.5)
HGB BLD-MCNC: 11.9 G/DL — SIGNIFICANT CHANGE UP (ref 11.5–15.5)
HGB BLD-MCNC: 12.1 G/DL — SIGNIFICANT CHANGE UP (ref 11.5–15.5)
HIV 1+2 AB+HIV1 P24 AG SERPL QL IA: SIGNIFICANT CHANGE UP
IANC: 0.03 K/UL — LOW (ref 1.8–7.4)
IANC: 0.03 K/UL — LOW (ref 1.8–7.4)
IANC: 0.07 K/UL — LOW (ref 1.8–7.4)
IANC: 0.11 K/UL — LOW (ref 1.8–7.4)
IMM GRANULOCYTES NFR BLD AUTO: 0 % — SIGNIFICANT CHANGE UP (ref 0–0.9)
INR BLD: 1.73 RATIO — HIGH (ref 0.85–1.18)
INR BLD: 1.93 RATIO — HIGH (ref 0.85–1.18)
INR BLD: 2.2 RATIO — HIGH (ref 0.85–1.18)
INR BLD: 2.28 RATIO — HIGH (ref 0.85–1.18)
INR BLD: 2.45 RATIO — HIGH (ref 0.85–1.18)
LACTATE, PRE MEMBRANE VENOUS: 1.8 MMOL/L — SIGNIFICANT CHANGE UP (ref 0.5–2)
LDH SERPL L TO P-CCNC: 627 U/L — HIGH (ref 135–225)
LEGIONELLA DNA SPEC NAA+PROBE: NEGATIVE — SIGNIFICANT CHANGE UP
LYMPHOCYTES # BLD AUTO: 0.08 K/UL — LOW (ref 1–3.3)
LYMPHOCYTES # BLD AUTO: 0.09 K/UL — LOW (ref 1–3.3)
LYMPHOCYTES # BLD AUTO: 0.09 K/UL — LOW (ref 1–3.3)
LYMPHOCYTES # BLD AUTO: 0.1 K/UL — LOW (ref 1–3.3)
LYMPHOCYTES # BLD AUTO: 32.1 % — SIGNIFICANT CHANGE UP (ref 13–44)
LYMPHOCYTES # BLD AUTO: 36 % — SIGNIFICANT CHANGE UP (ref 13–44)
LYMPHOCYTES # BLD AUTO: 40 % — SIGNIFICANT CHANGE UP (ref 13–44)
LYMPHOCYTES # BLD AUTO: 50 % — HIGH (ref 13–44)
M PNEUMO DNA SPEC QL NAA+PROBE: NEGATIVE — SIGNIFICANT CHANGE UP
MAGNESIUM SERPL-MCNC: 2.8 MG/DL — HIGH (ref 1.6–2.6)
MAGNESIUM SERPL-MCNC: 2.8 MG/DL — HIGH (ref 1.6–2.6)
MAGNESIUM SERPL-MCNC: 3 MG/DL — HIGH (ref 1.6–2.6)
MAGNESIUM SERPL-MCNC: 3 MG/DL — HIGH (ref 1.6–2.6)
MAGNESIUM SERPL-MCNC: 3.1 MG/DL — HIGH (ref 1.6–2.6)
MCHC RBC-ENTMCNC: 30.3 PG — SIGNIFICANT CHANGE UP (ref 27–34)
MCHC RBC-ENTMCNC: 30.4 PG — SIGNIFICANT CHANGE UP (ref 27–34)
MCHC RBC-ENTMCNC: 30.7 PG — SIGNIFICANT CHANGE UP (ref 27–34)
MCHC RBC-ENTMCNC: 30.8 PG — SIGNIFICANT CHANGE UP (ref 27–34)
MCHC RBC-ENTMCNC: 31.7 GM/DL — LOW (ref 32–36)
MCHC RBC-ENTMCNC: 32.2 GM/DL — SIGNIFICANT CHANGE UP (ref 32–36)
MCHC RBC-ENTMCNC: 32.4 GM/DL — SIGNIFICANT CHANGE UP (ref 32–36)
MCHC RBC-ENTMCNC: 32.8 GM/DL — SIGNIFICANT CHANGE UP (ref 32–36)
MCV RBC AUTO: 92.7 FL — SIGNIFICANT CHANGE UP (ref 80–100)
MCV RBC AUTO: 95.1 FL — SIGNIFICANT CHANGE UP (ref 80–100)
MCV RBC AUTO: 95.1 FL — SIGNIFICANT CHANGE UP (ref 80–100)
MCV RBC AUTO: 95.8 FL — SIGNIFICANT CHANGE UP (ref 80–100)
METHGB MFR BLDMV: 0.7 % — SIGNIFICANT CHANGE UP (ref 0–1.5)
MONOCYTES # BLD AUTO: 0.07 K/UL — SIGNIFICANT CHANGE UP (ref 0–0.9)
MONOCYTES # BLD AUTO: 0.08 K/UL — SIGNIFICANT CHANGE UP (ref 0–0.9)
MONOCYTES # BLD AUTO: 0.09 K/UL — SIGNIFICANT CHANGE UP (ref 0–0.9)
MONOCYTES # BLD AUTO: 0.09 K/UL — SIGNIFICANT CHANGE UP (ref 0–0.9)
MONOCYTES NFR BLD AUTO: 28.6 % — HIGH (ref 2–14)
MONOCYTES NFR BLD AUTO: 35 % — HIGH (ref 2–14)
MONOCYTES NFR BLD AUTO: 36 % — HIGH (ref 2–14)
MONOCYTES NFR BLD AUTO: 45 % — HIGH (ref 2–14)
NEUTROPHILS # BLD AUTO: 0.03 K/UL — LOW (ref 1.8–7.4)
NEUTROPHILS # BLD AUTO: 0.03 K/UL — LOW (ref 1.8–7.4)
NEUTROPHILS # BLD AUTO: 0.07 K/UL — LOW (ref 1.8–7.4)
NEUTROPHILS # BLD AUTO: 0.11 K/UL — LOW (ref 1.8–7.4)
NEUTROPHILS NFR BLD AUTO: 15 % — LOW (ref 43–77)
NEUTROPHILS NFR BLD AUTO: 15 % — LOW (ref 43–77)
NEUTROPHILS NFR BLD AUTO: 28 % — LOW (ref 43–77)
NEUTROPHILS NFR BLD AUTO: 39.3 % — LOW (ref 43–77)
NRBC # BLD: 28 /100 WBCS — HIGH (ref 0–0)
NRBC # BLD: 29 /100 WBCS — HIGH (ref 0–0)
NRBC # BLD: 35 /100 WBCS — HIGH (ref 0–0)
NRBC # BLD: 40 /100 WBCS — HIGH (ref 0–0)
NRBC # FLD: 0.07 K/UL — HIGH (ref 0–0)
NRBC # FLD: 0.07 K/UL — HIGH (ref 0–0)
NRBC # FLD: 0.08 K/UL — HIGH (ref 0–0)
NRBC # FLD: 0.08 K/UL — HIGH (ref 0–0)
OXYGEN SATURATION, PRE MEMBRANE VENOUS: 84.1 % — SIGNIFICANT CHANGE UP (ref 60–85)
OXYHEMOGLOBIN, PRE MEMBRANE VENOUS: 81.3 % — SIGNIFICANT CHANGE UP (ref 59–84)
P JIROVECII DNA L RESP QL NAA+NON-PROBE: NEGATIVE — SIGNIFICANT CHANGE UP
PCO2, PRE MEMBRANE VENOUS: 54 MMHG — HIGH (ref 39–42)
PH, PRE MEMBRANE VENOUS: 7.45 — HIGH (ref 7.32–7.43)
PHOSPHATE SERPL-MCNC: 2.7 MG/DL — SIGNIFICANT CHANGE UP (ref 2.5–4.5)
PHOSPHATE SERPL-MCNC: 2.9 MG/DL — SIGNIFICANT CHANGE UP (ref 2.5–4.5)
PHOSPHATE SERPL-MCNC: 2.9 MG/DL — SIGNIFICANT CHANGE UP (ref 2.5–4.5)
PHOSPHATE SERPL-MCNC: 3.2 MG/DL — SIGNIFICANT CHANGE UP (ref 2.5–4.5)
PHOSPHATE SERPL-MCNC: 3.4 MG/DL — SIGNIFICANT CHANGE UP (ref 2.5–4.5)
PLATELET # BLD AUTO: 38 K/UL — LOW (ref 150–400)
PLATELET # BLD AUTO: 39 K/UL — LOW (ref 150–400)
PLATELET # BLD AUTO: 44 K/UL — LOW (ref 150–400)
PLATELET # BLD AUTO: 58 K/UL — LOW (ref 150–400)
PO2, PRE MEMBRANE VENOUS: 57 MMHG — HIGH (ref 35–40)
POTASSIUM SERPL-MCNC: 4.3 MMOL/L — SIGNIFICANT CHANGE UP (ref 3.5–5.3)
POTASSIUM SERPL-MCNC: 4.4 MMOL/L — SIGNIFICANT CHANGE UP (ref 3.5–5.3)
POTASSIUM SERPL-MCNC: 4.5 MMOL/L — SIGNIFICANT CHANGE UP (ref 3.5–5.3)
POTASSIUM SERPL-SCNC: 4.3 MMOL/L — SIGNIFICANT CHANGE UP (ref 3.5–5.3)
POTASSIUM SERPL-SCNC: 4.4 MMOL/L — SIGNIFICANT CHANGE UP (ref 3.5–5.3)
POTASSIUM SERPL-SCNC: 4.5 MMOL/L — SIGNIFICANT CHANGE UP (ref 3.5–5.3)
PROT SERPL-MCNC: 4.6 G/DL — LOW (ref 6–8.3)
PROT SERPL-MCNC: 4.8 G/DL — LOW (ref 6–8.3)
PROT SERPL-MCNC: 5 G/DL — LOW (ref 6–8.3)
PROT SERPL-MCNC: 5.1 G/DL — LOW (ref 6–8.3)
PROT SERPL-MCNC: 5.2 G/DL — LOW (ref 6–8.3)
PROTHROM AB SERPL-ACNC: 19.2 SEC — HIGH (ref 9.5–13)
PROTHROM AB SERPL-ACNC: 21.4 SEC — HIGH (ref 9.5–13)
PROTHROM AB SERPL-ACNC: 24.3 SEC — HIGH (ref 9.5–13)
PROTHROM AB SERPL-ACNC: 25.2 SEC — HIGH (ref 9.5–13)
PROTHROM AB SERPL-ACNC: 27 SEC — HIGH (ref 9.5–13)
PYRIDOXAL PHOS SERPL-MCNC: 11.2 UG/L — SIGNIFICANT CHANGE UP (ref 3.4–65.2)
RBC # BLD: 3.64 M/UL — LOW (ref 3.8–5.2)
RBC # BLD: 3.79 M/UL — LOW (ref 3.8–5.2)
RBC # BLD: 3.88 M/UL — SIGNIFICANT CHANGE UP (ref 3.8–5.2)
RBC # BLD: 3.98 M/UL — SIGNIFICANT CHANGE UP (ref 3.8–5.2)
RBC # FLD: 15.5 % — HIGH (ref 10.3–14.5)
RBC # FLD: 15.8 % — HIGH (ref 10.3–14.5)
RBC # FLD: 15.8 % — HIGH (ref 10.3–14.5)
RBC # FLD: 15.9 % — HIGH (ref 10.3–14.5)
SODIUM SERPL-SCNC: 145 MMOL/L — SIGNIFICANT CHANGE UP (ref 135–145)
SODIUM SERPL-SCNC: 147 MMOL/L — HIGH (ref 135–145)
SODIUM SERPL-SCNC: 148 MMOL/L — HIGH (ref 135–145)
SODIUM SERPL-SCNC: 148 MMOL/L — HIGH (ref 135–145)
SODIUM SERPL-SCNC: 150 MMOL/L — HIGH (ref 135–145)
SPECIMEN SOURCE: SIGNIFICANT CHANGE UP
T4 FREE SERPL-MCNC: 0.7 NG/DL — LOW (ref 0.9–1.8)
TOTAL HEMOGLOBIN, PRE MEMBRANE VENOUS: 12.3 G/DL — SIGNIFICANT CHANGE UP (ref 11.5–15.5)
TRIGL SERPL-MCNC: 221 MG/DL — HIGH
TSH SERPL-MCNC: 0.13 UIU/ML — LOW (ref 0.27–4.2)
VIT B12 SERPL-MCNC: >2000 PG/ML — SIGNIFICANT CHANGE UP (ref 200–900)
WBC # BLD: 0.2 K/UL — CRITICAL LOW (ref 3.8–10.5)
WBC # BLD: 0.2 K/UL — CRITICAL LOW (ref 3.8–10.5)
WBC # BLD: 0.25 K/UL — CRITICAL LOW (ref 3.8–10.5)
WBC # BLD: 0.28 K/UL — CRITICAL LOW (ref 3.8–10.5)
WBC # FLD AUTO: 0.2 K/UL — CRITICAL LOW (ref 3.8–10.5)
WBC # FLD AUTO: 0.2 K/UL — CRITICAL LOW (ref 3.8–10.5)
WBC # FLD AUTO: 0.25 K/UL — CRITICAL LOW (ref 3.8–10.5)
WBC # FLD AUTO: 0.28 K/UL — CRITICAL LOW (ref 3.8–10.5)

## 2023-09-17 PROCEDURE — 93308 TTE F-UP OR LMTD: CPT | Mod: 26,GC

## 2023-09-17 PROCEDURE — 33948 ECMO/ECLS DAILY MGMT-VENOUS: CPT | Mod: GC

## 2023-09-17 PROCEDURE — 99291 CRITICAL CARE FIRST HOUR: CPT | Mod: GC,25

## 2023-09-17 PROCEDURE — 71045 X-RAY EXAM CHEST 1 VIEW: CPT | Mod: 26

## 2023-09-17 PROCEDURE — 76604 US EXAM CHEST: CPT | Mod: 26,GC

## 2023-09-17 PROCEDURE — 99292 CRITICAL CARE ADDL 30 MIN: CPT | Mod: GC,25

## 2023-09-17 RX ORDER — FOLIC ACID 0.8 MG
1 TABLET ORAL DAILY
Refills: 0 | Status: DISCONTINUED | OUTPATIENT
Start: 2023-09-17 | End: 2023-10-05

## 2023-09-17 RX ORDER — FILGRASTIM 480MCG/1.6
480 VIAL (ML) INJECTION DAILY
Refills: 0 | Status: DISCONTINUED | OUTPATIENT
Start: 2023-09-17 | End: 2023-09-17

## 2023-09-17 RX ORDER — FILGRASTIM 480MCG/1.6
480 VIAL (ML) INJECTION DAILY
Refills: 0 | Status: DISCONTINUED | OUTPATIENT
Start: 2023-09-17 | End: 2023-09-21

## 2023-09-17 RX ORDER — METOCLOPRAMIDE HCL 10 MG
5 TABLET ORAL EVERY 8 HOURS
Refills: 0 | Status: COMPLETED | OUTPATIENT
Start: 2023-09-17 | End: 2023-09-18

## 2023-09-17 RX ORDER — SODIUM CHLORIDE 9 MG/ML
1000 INJECTION, SOLUTION INTRAVENOUS
Refills: 0 | Status: DISCONTINUED | OUTPATIENT
Start: 2023-09-17 | End: 2023-09-18

## 2023-09-17 RX ORDER — DEXMEDETOMIDINE HYDROCHLORIDE IN 0.9% SODIUM CHLORIDE 4 UG/ML
0.2 INJECTION INTRAVENOUS
Qty: 400 | Refills: 0 | Status: DISCONTINUED | OUTPATIENT
Start: 2023-09-17 | End: 2023-09-22

## 2023-09-17 RX ADMIN — Medication 3 MILLILITER(S): at 21:43

## 2023-09-17 RX ADMIN — NALOXEGOL OXALATE 25 MILLIGRAM(S): 12.5 TABLET, FILM COATED ORAL at 10:29

## 2023-09-17 RX ADMIN — Medication 3 MILLILITER(S): at 04:16

## 2023-09-17 RX ADMIN — PROPOFOL 38.3 MICROGRAM(S)/KG/MIN: 10 INJECTION, EMULSION INTRAVENOUS at 20:00

## 2023-09-17 RX ADMIN — MEROPENEM 100 MILLIGRAM(S): 1 INJECTION INTRAVENOUS at 14:33

## 2023-09-17 RX ADMIN — Medication 36.9 MICROGRAM(S)/KG/MIN: at 06:22

## 2023-09-17 RX ADMIN — Medication 125 MILLIGRAM(S): at 06:20

## 2023-09-17 RX ADMIN — Medication 1 DROP(S): at 06:20

## 2023-09-17 RX ADMIN — POLYETHYLENE GLYCOL 3350 17 GRAM(S): 17 POWDER, FOR SOLUTION ORAL at 14:34

## 2023-09-17 RX ADMIN — ARGATROBAN 1.15 MICROGRAM(S)/KG/MIN: 50 INJECTION, SOLUTION INTRAVENOUS at 20:00

## 2023-09-17 RX ADMIN — Medication 36.9 MICROGRAM(S)/KG/MIN: at 10:28

## 2023-09-17 RX ADMIN — SENNA PLUS 10 MILLILITER(S): 8.6 TABLET ORAL at 06:24

## 2023-09-17 RX ADMIN — PANTOPRAZOLE SODIUM 40 MILLIGRAM(S): 20 TABLET, DELAYED RELEASE ORAL at 11:23

## 2023-09-17 RX ADMIN — HYDROMORPHONE HYDROCHLORIDE 1 MG/HR: 2 INJECTION INTRAMUSCULAR; INTRAVENOUS; SUBCUTANEOUS at 10:29

## 2023-09-17 RX ADMIN — Medication 15 MILLILITER(S): at 11:22

## 2023-09-17 RX ADMIN — PROPOFOL 38.3 MICROGRAM(S)/KG/MIN: 10 INJECTION, EMULSION INTRAVENOUS at 06:21

## 2023-09-17 RX ADMIN — MEROPENEM 100 MILLIGRAM(S): 1 INJECTION INTRAVENOUS at 06:20

## 2023-09-17 RX ADMIN — CHLORHEXIDINE GLUCONATE 15 MILLILITER(S): 213 SOLUTION TOPICAL at 17:48

## 2023-09-17 RX ADMIN — Medication 5 MILLIGRAM(S): at 14:34

## 2023-09-17 RX ADMIN — SODIUM CHLORIDE 100 MILLILITER(S): 9 INJECTION, SOLUTION INTRAVENOUS at 16:00

## 2023-09-17 RX ADMIN — MEROPENEM 100 MILLIGRAM(S): 1 INJECTION INTRAVENOUS at 21:52

## 2023-09-17 RX ADMIN — ATOVAQUONE 1500 MILLIGRAM(S): 750 SUSPENSION ORAL at 11:23

## 2023-09-17 RX ADMIN — Medication 1 DROP(S): at 17:49

## 2023-09-17 RX ADMIN — Medication 5 MILLIGRAM(S): at 06:21

## 2023-09-17 RX ADMIN — INSULIN HUMAN 2.8 UNIT(S)/HR: 100 INJECTION, SOLUTION SUBCUTANEOUS at 20:00

## 2023-09-17 RX ADMIN — CHLORHEXIDINE GLUCONATE 1 APPLICATION(S): 213 SOLUTION TOPICAL at 06:40

## 2023-09-17 RX ADMIN — Medication 5 MILLIGRAM(S): at 21:52

## 2023-09-17 RX ADMIN — SENNA PLUS 10 MILLILITER(S): 8.6 TABLET ORAL at 17:48

## 2023-09-17 RX ADMIN — HYDROMORPHONE HYDROCHLORIDE 1 MG/HR: 2 INJECTION INTRAMUSCULAR; INTRAVENOUS; SUBCUTANEOUS at 20:00

## 2023-09-17 RX ADMIN — DEXMEDETOMIDINE HYDROCHLORIDE IN 0.9% SODIUM CHLORIDE 6.38 MICROGRAM(S)/KG/HR: 4 INJECTION INTRAVENOUS at 20:00

## 2023-09-17 RX ADMIN — ARGATROBAN 1.15 MICROGRAM(S)/KG/MIN: 50 INJECTION, SOLUTION INTRAVENOUS at 06:21

## 2023-09-17 RX ADMIN — Medication 3 MILLILITER(S): at 16:25

## 2023-09-17 RX ADMIN — POLYETHYLENE GLYCOL 3350 17 GRAM(S): 17 POWDER, FOR SOLUTION ORAL at 06:21

## 2023-09-17 RX ADMIN — INSULIN HUMAN 2.8 UNIT(S)/HR: 100 INJECTION, SOLUTION SUBCUTANEOUS at 10:29

## 2023-09-17 RX ADMIN — Medication 3 MILLILITER(S): at 09:51

## 2023-09-17 RX ADMIN — Medication 36.9 MICROGRAM(S)/KG/MIN: at 20:00

## 2023-09-17 RX ADMIN — Medication 480 MICROGRAM(S): at 11:52

## 2023-09-17 RX ADMIN — POLYETHYLENE GLYCOL 3350 17 GRAM(S): 17 POWDER, FOR SOLUTION ORAL at 21:53

## 2023-09-17 RX ADMIN — HYDROMORPHONE HYDROCHLORIDE 1 MG/HR: 2 INJECTION INTRAMUSCULAR; INTRAVENOUS; SUBCUTANEOUS at 06:22

## 2023-09-17 RX ADMIN — INSULIN HUMAN 3 UNIT(S)/HR: 100 INJECTION, SOLUTION SUBCUTANEOUS at 06:21

## 2023-09-17 RX ADMIN — ARGATROBAN 1.15 MICROGRAM(S)/KG/MIN: 50 INJECTION, SOLUTION INTRAVENOUS at 10:29

## 2023-09-17 RX ADMIN — CHLORHEXIDINE GLUCONATE 15 MILLILITER(S): 213 SOLUTION TOPICAL at 06:24

## 2023-09-17 RX ADMIN — PROPOFOL 38.3 MICROGRAM(S)/KG/MIN: 10 INJECTION, EMULSION INTRAVENOUS at 10:28

## 2023-09-17 RX ADMIN — SODIUM CHLORIDE 100 MILLILITER(S): 9 INJECTION, SOLUTION INTRAVENOUS at 20:00

## 2023-09-17 NOTE — CHART NOTE - NSCHARTNOTEFT_GEN_A_CORE
:  Wong/LeJeune    INDICATION:  VV ECMO    PROCEDURE:  [ x] LIMITED ECHO  [ x] LIMITED CHEST  [ ] LIMITED RETROPERITONEAL  [ ] LIMITED ABDOMINAL  [ ] LIMITED DVT  [ ] NEEDLE GUIDANCE VASCULAR  [ ] NEEDLE GUIDANCE THORACENTESIS  [ ] NEEDLE GUIDANCE PARACENTESIS  [ ] NEEDLE GUIDANCE PERICARDIOCENTESIS  [ ] OTHER    FINDINGS:  mildly reduced LVSF. diastolic dysfunction, probably grade 1. TAPSE 1.6. S' 9 cm/s. LVOT VTI 22.4. Pericardial fat pad. LV>RV. IVC 2cm. Drainage ecmo cannula seen within IVC.    B lines b/l, less in the Rt upper lung field than prior. consolidated lung at bases. Small Lt pleural effusion.    trace perihepatic ascites. Dilated gastric fundus and fluid filled antrum.    INTERPRETATION:  Adequate DO2  Persistent airspace disease  distended stomach    Images uploaded to [employer]    Time spent on the procedure was separate from the critical care time spent for patient care. :  Michi/LeJeune    INDICATION:  VV ECMO    PROCEDURE:  [ x] LIMITED ECHO  [ x] LIMITED CHEST  [ ] LIMITED RETROPERITONEAL  [ ] LIMITED ABDOMINAL  [ ] LIMITED DVT  [ ] NEEDLE GUIDANCE VASCULAR  [ ] NEEDLE GUIDANCE THORACENTESIS  [ ] NEEDLE GUIDANCE PARACENTESIS  [ ] NEEDLE GUIDANCE PERICARDIOCENTESIS  [ ] OTHER    FINDINGS:  mildly reduced LVSF. diastolic dysfunction, probably grade 1. TAPSE 1.6. S' 9 cm/s. LVOT VTI 22.4. Pericardial fat pad. LV>RV. IVC 2cm. Drainage ecmo cannula seen within IVC.    B lines b/l, less in the Rt upper lung field than prior. consolidated lung at bases. Small Lt pleural effusion.    trace perihepatic ascites. Dilated gastric fundus and fluid filled antrum.    INTERPRETATION:  Adequate DO2  Persistent airspace disease  distended stomach    Images uploaded to [employer]    Time spent on the procedure was separate from the critical care time spent for patient care.    Jozef Borden MD  Pulmonary & Critical Care

## 2023-09-17 NOTE — PROGRESS NOTE ADULT - SUBJECTIVE AND OBJECTIVE BOX
Interval Events:  argatroban adjusted o/n  no changes w circuit    OBJECTIVE:  ICU Vital Signs Last 24 Hrs  T(C): 35 (17 Sep 2023 00:00), Max: 35.6 (16 Sep 2023 08:00)  T(F): 95 (17 Sep 2023 00:00), Max: 96.1 (16 Sep 2023 08:00)  HR: 61 (17 Sep 2023 07:00) (59 - 74)  BP: --  BP(mean): --  ABP: 115/62 (17 Sep 2023 07:00) (88/54 - 137/72)  ABP(mean): 83 (17 Sep 2023 07:00) (69 - 98)  RR: 12 (17 Sep 2023 07:00) (0 - 19)  SpO2: 98% (17 Sep 2023 07:00) (95% - 100%)    O2 Parameters below as of 17 Sep 2023 07:00  Patient On (Oxygen Delivery Method): ventilator    O2 Concentration (%): 40      Mode: AC/ CMV (Assist Control/ Continuous Mandatory Ventilation), RR (machine): 12, FiO2: 40, PEEP: 12, ITime: 1, MAP: 15, PC: 12, PIP: 24    09-16 @ 07:01  -  09-17 @ 07:00  --------------------------------------------------------  IN: 4119.9 mL / OUT: 6740 mL / NET: -2620.1 mL      CAPILLARY BLOOD GLUCOSE      POCT Blood Glucose.: 182 mg/dL (17 Sep 2023 06:59)      PHYSICAL EXAM:      LINES:    HOSPITAL MEDICATIONS:  argatroban Infusion 0.15 MICROgram(s)/kG/Min IV Continuous <Continuous>    atovaquone  Suspension 1500 milliGRAM(s) Oral daily  meropenem  IVPB      meropenem  IVPB 1000 milliGRAM(s) IV Intermittent every 8 hours    norepinephrine Infusion 0.3 MICROgram(s)/kG/Min IV Continuous <Continuous>    dextrose 50% Injectable 25 Gram(s) IV Push once  dextrose 50% Injectable 12.5 Gram(s) IV Push once  dextrose 50% Injectable 25 Gram(s) IV Push once  dextrose Oral Gel 15 Gram(s) Oral once PRN  glucagon  Injectable 1 milliGRAM(s) IntraMuscular once  insulin regular Infusion 3 Unit(s)/Hr IV Continuous <Continuous>  methylPREDNISolone sodium succinate Injectable 125 milliGRAM(s) IV Push daily    albuterol/ipratropium for Nebulization 3 milliLiter(s) Nebulizer every 6 hours  diphenhydrAMINE Injectable 50 milliGRAM(s) IV Push once PRN    HYDROmorphone Infusion. 1 mG/Hr IV Continuous <Continuous>  meperidine     Injectable 25 milliGRAM(s) IV Push once PRN  propofol Infusion 50 MICROgram(s)/kG/Min IV Continuous <Continuous>    naloxegol 25 milliGRAM(s) Oral daily  pantoprazole  Injectable 40 milliGRAM(s) IV Push daily  polyethylene glycol 3350 17 Gram(s) Oral <User Schedule>  senna Syrup 10 milliLiter(s) Oral two times a day        dextrose 5%. 1000 milliLiter(s) IV Continuous <Continuous>  dextrose 5%. 1000 milliLiter(s) IV Continuous <Continuous>  multivitamin/minerals/iron Oral Solution (CENTRUM) 15 milliLiter(s) Oral daily      artificial  tears Solution 1 Drop(s) Both EYES every 12 hours  chlorhexidine 0.12% Liquid 15 milliLiter(s) Oral Mucosa every 12 hours  chlorhexidine 2% Cloths 1 Application(s) Topical <User Schedule>        LABS:                        12.1   0.28  )-----------( 38       ( 17 Sep 2023 04:00 )             36.9     Hgb Trend: 12.1<--, 12.3<--, 12.1<--, 12.1<--, 12.1<--  09-17    147<H>  |  105  |  54<H>  ----------------------------<  202<H>  4.4   |  30  |  0.73    Ca    8.4      17 Sep 2023 04:00  Phos  2.9     09-17  Mg     2.80     09-17    TPro  4.6<L>  /  Alb  2.8<L>  /  TBili  6.8<H>  /  DBili  x   /  AST  348<H>  /  ALT  994<H>  /  AlkPhos  79  09-17    Creatinine Trend: 0.73<--, 0.80<--, 0.85<--, 0.90<--, 0.91<--, 0.95<--  PT/INR - ( 17 Sep 2023 04:00 )   PT: 27.0 sec;   INR: 2.45 ratio         PTT - ( 17 Sep 2023 04:00 )  PTT:71.8 sec  Urinalysis Basic - ( 17 Sep 2023 04:00 )    Color: x / Appearance: x / SG: x / pH: x  Gluc: 202 mg/dL / Ketone: x  / Bili: x / Urobili: x   Blood: x / Protein: x / Nitrite: x   Leuk Esterase: x / RBC: x / WBC x   Sq Epi: x / Non Sq Epi: x / Bacteria: x      Arterial Blood Gas:  09-17 @ 04:00  7.47/52/146/38/98.8/12.3  ABG lactate: --  Arterial Blood Gas:  09-16 @ 20:23  7.40/59/115/36/98.5/9.7  ABG lactate: --  Arterial Blood Gas:  09-16 @ 15:30  7.33/68/104/36/97.7/7.7  ABG lactate: --  Arterial Blood Gas:  09-16 @ 09:45  7.38/64/124/38/97.8/10.4  ABG lactate: --  Arterial Blood Gas:  09-16 @ 05:50  7.42/57/78/37/96.1/10.5  ABG lactate: --  Arterial Blood Gas:  09-16 @ 03:20  7.41/59/82/37/96.9/10.7  ABG lactate: --  Arterial Blood Gas:  09-16 @ 02:01  7.38/40/77/24/96.8/-1.3  ABG lactate: --  Arterial Blood Gas:  09-16 @ 00:05  7.46/54/92/38/97.5/12.5  ABG lactate: --  Arterial Blood Gas:  09-15 @ 23:00  7.46/52/95/37/98.3/11.4  ABG lactate: --  Arterial Blood Gas:  09-15 @ 21:36  7.48/49/126/36/99.2/11.3  ABG lactate: --  Arterial Blood Gas:  09-15 @ 18:04  7.45/52/95/36/98.0/10.4  ABG lactate: --  Arterial Blood Gas:  09-15 @ 17:07  7.46/48/95/34/97.9/9.0  ABG lactate: --  Arterial Blood Gas:  09-15 @ 14:58  7.38/61/96/36/97.5/8.9  ABG lactate: --  Arterial Blood Gas:  09-15 @ 08:36  7.40/50/100/31/98.4/5.2  ABG lactate: --        MICROBIOLOGY:     RADIOLOGY:  [ ] Reviewed and interpreted by me    EKG: Interval Events:  argatroban adjusted o/n  no changes w circuit  BM o/n  planned for plex tomorrow  yesterday w vomiting s/p IVP    OBJECTIVE:  ICU Vital Signs Last 24 Hrs  T(C): 35 (17 Sep 2023 00:00), Max: 35.6 (16 Sep 2023 08:00)  T(F): 95 (17 Sep 2023 00:00), Max: 96.1 (16 Sep 2023 08:00)  HR: 61 (17 Sep 2023 07:00) (59 - 74)  BP: --  BP(mean): --  ABP: 115/62 (17 Sep 2023 07:00) (88/54 - 137/72)  ABP(mean): 83 (17 Sep 2023 07:00) (69 - 98)  RR: 12 (17 Sep 2023 07:00) (0 - 19)  SpO2: 98% (17 Sep 2023 07:00) (95% - 100%)    O2 Parameters below as of 17 Sep 2023 07:00  Patient On (Oxygen Delivery Method): ventilator    O2 Concentration (%): 40      Mode: AC/ CMV (Assist Control/ Continuous Mandatory Ventilation), RR (machine): 12, FiO2: 40, PEEP: 12, ITime: 1, MAP: 15, PC: 12, PIP: 24    09-16 @ 07:01  -  09-17 @ 07:00  --------------------------------------------------------  IN: 4119.9 mL / OUT: 6740 mL / NET: -2620.1 mL      CAPILLARY BLOOD GLUCOSE      POCT Blood Glucose.: 182 mg/dL (17 Sep 2023 06:59)      PHYSICAL EXAM:  Constitutional: no acute distress   HEENT: + PERRLA, EOMI, no drainage or redness  Neck: supple, RIJ ECMO cannula, LIJ TLC  Respiratory: Ventilator assisted breath Sounds equal & diminished bilaterally to auscultation, no accessory muscle use noted  Cardiovascular: Sinus fady, regular rate, regular rhythm, normal S1, S2; no murmurs or rub  Gastrointestinal: Soft, non-tender, non distended, no hepatosplenomegaly, normal bowel sounds  Extremities: + 2 peripheral edema, R. Fem ECMO cannula  Vascular: Equal and normal pulses: 2+ peripheral pulses by doppler   Neurological: sedated/intubated  Psychiatric: calm, normal mood, normal affect  Musculoskeletal: No joint swelling or deformity; no limitation of movement  Skin: warm, dry, well perfused, no rashes, right fem cannula site with yellow tissue/slough    LINES:    HOSPITAL MEDICATIONS:  argatroban Infusion 0.15 MICROgram(s)/kG/Min IV Continuous <Continuous>    atovaquone  Suspension 1500 milliGRAM(s) Oral daily  meropenem  IVPB      meropenem  IVPB 1000 milliGRAM(s) IV Intermittent every 8 hours    norepinephrine Infusion 0.3 MICROgram(s)/kG/Min IV Continuous <Continuous>    dextrose 50% Injectable 25 Gram(s) IV Push once  dextrose 50% Injectable 12.5 Gram(s) IV Push once  dextrose 50% Injectable 25 Gram(s) IV Push once  dextrose Oral Gel 15 Gram(s) Oral once PRN  glucagon  Injectable 1 milliGRAM(s) IntraMuscular once  insulin regular Infusion 3 Unit(s)/Hr IV Continuous <Continuous>  methylPREDNISolone sodium succinate Injectable 125 milliGRAM(s) IV Push daily    albuterol/ipratropium for Nebulization 3 milliLiter(s) Nebulizer every 6 hours  diphenhydrAMINE Injectable 50 milliGRAM(s) IV Push once PRN    HYDROmorphone Infusion. 1 mG/Hr IV Continuous <Continuous>  meperidine     Injectable 25 milliGRAM(s) IV Push once PRN  propofol Infusion 50 MICROgram(s)/kG/Min IV Continuous <Continuous>    naloxegol 25 milliGRAM(s) Oral daily  pantoprazole  Injectable 40 milliGRAM(s) IV Push daily  polyethylene glycol 3350 17 Gram(s) Oral <User Schedule>  senna Syrup 10 milliLiter(s) Oral two times a day        dextrose 5%. 1000 milliLiter(s) IV Continuous <Continuous>  dextrose 5%. 1000 milliLiter(s) IV Continuous <Continuous>  multivitamin/minerals/iron Oral Solution (CENTRUM) 15 milliLiter(s) Oral daily      artificial  tears Solution 1 Drop(s) Both EYES every 12 hours  chlorhexidine 0.12% Liquid 15 milliLiter(s) Oral Mucosa every 12 hours  chlorhexidine 2% Cloths 1 Application(s) Topical <User Schedule>        LABS:                        12.1   0.28  )-----------( 38       ( 17 Sep 2023 04:00 )             36.9     Hgb Trend: 12.1<--, 12.3<--, 12.1<--, 12.1<--, 12.1<--  09-17    147<H>  |  105  |  54<H>  ----------------------------<  202<H>  4.4   |  30  |  0.73    Ca    8.4      17 Sep 2023 04:00  Phos  2.9     09-17  Mg     2.80     09-17    TPro  4.6<L>  /  Alb  2.8<L>  /  TBili  6.8<H>  /  DBili  x   /  AST  348<H>  /  ALT  994<H>  /  AlkPhos  79  09-17    Creatinine Trend: 0.73<--, 0.80<--, 0.85<--, 0.90<--, 0.91<--, 0.95<--  PT/INR - ( 17 Sep 2023 04:00 )   PT: 27.0 sec;   INR: 2.45 ratio         PTT - ( 17 Sep 2023 04:00 )  PTT:71.8 sec  Urinalysis Basic - ( 17 Sep 2023 04:00 )    Color: x / Appearance: x / SG: x / pH: x  Gluc: 202 mg/dL / Ketone: x  / Bili: x / Urobili: x   Blood: x / Protein: x / Nitrite: x   Leuk Esterase: x / RBC: x / WBC x   Sq Epi: x / Non Sq Epi: x / Bacteria: x      Arterial Blood Gas:  09-17 @ 04:00  7.47/52/146/38/98.8/12.3  ABG lactate: --  Arterial Blood Gas:  09-16 @ 20:23  7.40/59/115/36/98.5/9.7  ABG lactate: --  Arterial Blood Gas:  09-16 @ 15:30  7.33/68/104/36/97.7/7.7  ABG lactate: --  Arterial Blood Gas:  09-16 @ 09:45  7.38/64/124/38/97.8/10.4  ABG lactate: --  Arterial Blood Gas:  09-16 @ 05:50  7.42/57/78/37/96.1/10.5  ABG lactate: --  Arterial Blood Gas:  09-16 @ 03:20  7.41/59/82/37/96.9/10.7  ABG lactate: --  Arterial Blood Gas:  09-16 @ 02:01  7.38/40/77/24/96.8/-1.3  ABG lactate: --  Arterial Blood Gas:  09-16 @ 00:05  7.46/54/92/38/97.5/12.5  ABG lactate: --  Arterial Blood Gas:  09-15 @ 23:00  7.46/52/95/37/98.3/11.4  ABG lactate: --  Arterial Blood Gas:  09-15 @ 21:36  7.48/49/126/36/99.2/11.3  ABG lactate: --  Arterial Blood Gas:  09-15 @ 18:04  7.45/52/95/36/98.0/10.4  ABG lactate: --  Arterial Blood Gas:  09-15 @ 17:07  7.46/48/95/34/97.9/9.0  ABG lactate: --  Arterial Blood Gas:  09-15 @ 14:58  7.38/61/96/36/97.5/8.9  ABG lactate: --  Arterial Blood Gas:  09-15 @ 08:36  7.40/50/100/31/98.4/5.2  ABG lactate: --        MICROBIOLOGY:     RADIOLOGY:  [ ] Reviewed and interpreted by me    EKG:

## 2023-09-17 NOTE — PROGRESS NOTE ADULT - ASSESSMENT
63 yo F w/ Afib initially presented to urgent care for SOB where she had an XR done concerning for b/l lower infiltrates, sent to [facility] ED and  admitted to [facility] on 8/26 after being found to be hypoxemic, reported having  debilitating b/l hand numbness/tingling and some muscles weakness several months. Found to be hypoxic and have interstitial lung disease appearance on CT, admitted 8/26 for AHRF, further ILD workup and management,  started on prednisone on admission with gradually improving O2 requirement and has been stable on 2-3LNC since 9/3, underwent bronchoscopy with TBBX on 8/30 which showed Non-Specific Intersitial Disease (NSIP)/OP. Labs notable for positive ARIANA 1:1280, RNP > 8, Alejandro > 8. Patient continued on steroids and recieved cellcept, which was discontinued 2/2 Afib-RVR. Interval CTA chest on 9/11 also negative PE did show possible lingula pneumonia.  Started on empiric vancomycin and zosyn 9/12, course was then c/b episode of SVT to 170s w/ rapidly progressive hypoxemic respiratory failure, placed NRB offered HFNC/BiPAP but refused, leading to worsening hypoxemic respiratory failure requiring intubation  9/13. Despite best practices, patient remained hypoxemic and patient was cannulated for VV-ECMO on 9/13 and transferred to [facility].      NEUROLOGY  Home meds: gabapentin 100g TID  AA&Ox3 at baseline   - opened eyes, moved LE and R upper arm spontaneously but did not following commands off sedation  - currently sedated w propfol and dilaudid i/s/o severe hypoxemic respiratory failure, plan to wean dilaudid today to RASS -1/-2  - received cisatracurium IVP given for procedures yesterday  - low threshold to paralyze if dysynchronus   - CT 9/14 no acute findings  - Neurology following at OSH for pain and numbness of upper extremities x3 mos likely  neuropathy or myopathy r/t  underlying rheum condition, planned for EMG outpt w/Dr. Marie or Dr. Urbano  - Palliative following  - Social Work consult  - PT consult   dilaudid 1  prop 50    PULMONARY  Admitted to [facility] on 8/26 after p/w SOB, found to be hypoxemic, required 2-4L NC/bibap, initially responded well to IV steroids. 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 2/2 bacterial PNA vs atypical PNA vs aspiration vs AEILD   - pt reportedly had been seen by a pulmonologist and rheumatology ([place]), and was ruled out for lupus and other immunological disorders.  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  - airway clearance to assist facilitate secretion mobilization with IPV and duonebs  - f/u BAL cultures, urine strep and legionella  - c/w empiric abx   - PLEX 9/18  trialed IPV yesterday, vomited shortly after initiation    ECMO  VV ECMO		  Cannulation sites/dates: 9/13/23 R fem 25F. [facility] 19F   Flow: 3.7-4		P1: 176  	Delta P: 22  RPM: 3000		P2: 154		SVO2: 80  Sweep:   2.5                   L/min:              FIO2:   .70      ECMO Calculated CO:  4.0-4.9  DO2/VO2:   VO2/DO2:     - 9/14 right groin cannula pulled-back ~3-4cm to position tip of cannula just inferior to hepatic vein   - clinically perfusing. Lactate: 1.4  - ECMO sweep sighing q1hr  - Adjust circuit flow as tolerated with DO2:VO2 goal >2  - Monitor for hemolysis, chatter, evidence of recirculation       CARDIOVASCULAR  Hx of Afib w at OSH, started on Amiodarone gtt now in shock state requiring requiring pressors likely septic with component of vaspoplegia i/s/o sedation, possible contribution of cardiogenic from RV dysfunction   - No PE seen on invasive pulmonary angiography at time of ECMO cannulation or in recent imaging  - NO2 weaned off  (9/15) RV function remains unchanged  - s/p milrinone, off since (9/13)  - requiring pressors, continue to wean norepinephrine to maintain map > 65  - Roosevelt off since ( 9/14) and Vaso off since (9/15)  - converted to sinus (9/14) discontinued amio gtt iso bradycardia   - TTE 9/12 with EF 65-70% with normal LV and RV  - TTE 9/14 with  EF 18%. Severe global LV systolic dysfunction. RV enlargement with decreased RV systolic function, however RITCHIE and recent POCUS shows normal LV systolic function, improved RV systolic function RV< LV . LVOT VTI 20.9 cm. IVC 2.3 cm.        RENAL  sCr baseline 0.78  - Creatinine wnl, 0.83 today   - monitor i/o's, negative -60ml LOS/ negative -1.1L 24hrs  - s/p diuresis with lasix, last administered 9/15  - now with hemoconcentrator to assist with fluid removal, goal to keep 1-2L net negative  - indwelling catheter in place, making good UO  - monitor, Replete to K>4, Phos>3, Mg>2, iCa>1  - metabolic alkalosis, ?contraction      GASTROINTESTINAL  Transaminase elevation likely 2/2 combination of shock liver, malposition of ECMO cannula and liver dysfunction  - ALK normal 67, AST downtrending but remain elevated 525, ALT continues to rise 992, T.bili remains elevated 5 (mostly direct)  - Acute hepatitis panel negative  - RUQ US 9/15 shows fatty infiltration of liver, with no cholelithiasis or biliary ductal dilatation.  - Triglycerides elevated 836, on insulin gtt for hyperglycemia, now downtrending 406 > 271  - tolerating tube feeds via NGT   - no BM reported since admission, can give reglan  IVP x 3 doses for gastroparesis, QTc  416  - continue bowel regimen senna, miralax and movantik to avoid opioid induced constipation  - CT abdomen 9/15 w/o bowel obstruction, noted with colonic diverticulosis mostly in sigmoid, w/o evidence of diverticulitis  - continue PPI iso steroids   - nutrition following  - feeds held yesterday following vomiting     INFECTIOUS DISEASE  Neutropenia likely transient? vs drug induced vs infection vs malignancy?  - WBC 0.98, remains afebrile however temperature has been regulated via ECMO circuit, will turn off and monitor for fever  - Bactrim DS discontinued iso neutropenia, will start Mepron for PJP prophylaxis instead  - can repeat blood and sputum cultures 9/16  - heme consult given neutropenia and thrombocytopenia  - c/w empiric donald (9/13- ) for 7 day course  - s/p IV Ceftriaxone 5 days? at [facility] out of an abundance of precaution to cover BAL specimen  - s/p zosyn at OSH (9/12) for lingula PNA  - vancomycin (9/12-9/15 ) d/c'd negative mrsa PCR, and azithro (9/14-9/15) d/c'd neg Urine legionella  - 9/13 RVP negative, urine, sputum and blood cx ngtd  - f/u BAL cyto, cell count, fungitell  - f/u legionella PCR, quantiferon, mycoplasma pneumoniae, pneumocystis jiroveci pcr      ENDOCRINE  # Hyperglycemia i/s/o steroids  Admission A1c 6.1  - hyperglycemia iso steroids, glucose remained elevated despite increase of NPH and ISS  - continue insulin gtt for tighter glycemic control, and   - elevated triglycerides 835, has hx of fatty liver, currently on propofol gtt. TG downtrending since initiating insulin gtt for hyperglycemia, now 406> 271, continue to monitor daily      HEME  # ECMO anticoagulation  - continue argatroban gtt goal aPTT 50-70  - INR elevated likely iso argatroban and liver dysfxn,  -  s/p Vit. K (9/14) and FFP x1 (9/15)  - fibrinogen 237, monitor daily  - Hgb remains stable 11.9  - Transfuse for Hgb <7, Plt<10  - platelets continue to downtrend, 48 today  - Heme consult placed for thrombocytopenia, noted to also be neutropenic    #R/O DVT  - LE duplex negative for DVT but will repeat  - f/u duplex- pending    MSK  Onset of debilitating b/l hand cramps and some muscles weakness x several months, unclear etiology, radiculopathy? vs myositis?   - MRI outpatient reportedly showed cervical spine issues, started on Prednisone shortly before admission at OSH  - seen by neurologist at [facility]   - symptoms improved with cellcept (9/8-9/11) but discontinued given Afib RVR   - f/u with Dr. Nik Marie or Dante Urbano for EMG upon discharge (osh?)      RHEUM  - started on Solumedrol 60mg q6h from 9/1 to 9/6 then weaned over time to then Medrol 32 mg 9/9 to 9/12 at [facility]  - s/p Cellcept 9/8 to 9/11 but stopped due to Afib- RVR/tachycardia   - CT findings, Improved/decreased extent of bilateral ground glass opacities and reticular markings compared to the previous study. Findings are nonspecific but differential etiologies include interstitial pneumonia, drug toxicity, nonspecific connective tissue disorders.  - OSH labs show strongly positive ARIANA, RNP, and anti smith antibodies with low C4 and normal C3. 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 First session  (9/15), next session planned for Monday (will need to coordinate time with perfusion and blood bank)    SKIN  Reportedly had single episode of painful rash on her shins, ears and neck and chest which resolved with a steroid cream from a dermatologist prior to admission  - no evidence of rash currently      PROPHYLAXIS  # DVT: argatroban  # GI: TF      LINES  -Ecmo Cannulas  -[facility] TLC- 9/13 (placed at OSH)  -Left Axill [place] - 9/13 (placed at OSH)  -RA 16g PIV x2- 9/15      GOC  -Palliative Following    DISPO: ICU  CODE STATUS: Full   63 yo F w/ Afib initially presented to urgent care for SOB where she had an XR done concerning for b/l lower infiltrates, sent to [facility] ED and  admitted to [facility] on 8/26 after being found to be hypoxemic, reported having  debilitating b/l hand numbness/tingling and some muscles weakness several months. Found to be hypoxic and have interstitial lung disease appearance on CT, admitted 8/26 for AHRF, further ILD workup and management,  started on prednisone on admission with gradually improving O2 requirement and has been stable on 2-3LNC since 9/3, underwent bronchoscopy with TBBX on 8/30 which showed Non-Specific Intersitial Disease (NSIP)/OP. Labs notable for positive ARIANA 1:1280, RNP > 8, Alejandro > 8. Patient continued on steroids and recieved cellcept, which was discontinued 2/2 Afib-RVR. Interval CTA chest on 9/11 also negative PE did show possible lingula pneumonia.  Started on empiric vancomycin and zosyn 9/12, course was then c/b episode of SVT to 170s w/ rapidly progressive hypoxemic respiratory failure, placed NRB offered HFNC/BiPAP but refused, leading to worsening hypoxemic respiratory failure requiring intubation  9/13. Despite best practices, patient remained hypoxemic and patient was cannulated for VV-ECMO on 9/13 and transferred to [facility].      NEUROLOGY  Home meds: gabapentin 100g TID  AA&Ox3 at baseline   - opened eyes, moved LE and R upper arm spontaneously but did not following commands off sedation  - currently sedated w propfol and dilaudid i/s/o severe hypoxemic respiratory failure, plan to wean dilaudid today to RASS -1/-2  - received cisatracurium IVP given for procedures yesterday  - low threshold to paralyze if dysynchronus   - [facility] 9/14 no acute findings  - Neurology following at OSH for pain and numbness of upper extremities x3 mos likely  neuropathy or myopathy r/t  underlying rheum condition, planned for EMG outpt w/Dr. Marie or Dr. Urbano  - Palliative following  - Social Work consult  - PT consult   - wean sedation  - add precedex for anxiolysis if needed    PULMONARY  Admitted to [facility] on 8/26 after p/w SOB, found to be hypoxemic, required 2-4L NC/bibap, initially responded well to IV steroids. 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 2/2 bacterial PNA vs atypical PNA vs aspiration vs AEILD   - pt reportedly had been seen by a pulmonologist and rheumatology ([place]), and was ruled out for lupus and other immunological disorders.  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  - airway clearance to assist facilitate secretion mobilization with IPV and duonebs  - f/u BAL cultures, urine strep and legionella  - c/w empiric abx   - PLEX 9/18  trialed IPV yesterday, vomited shortly after initiation, now discontinued    ECMO  VV ECMO		  Cannulation sites/dates: 9/13/23 R fem 25F. [facility] 19F   Flow: 3.7-4		P1: 176  	Delta P: 22  RPM: 3000		P2: 154		SVO2: 80  Sweep:   2.5                   L/min:              FIO2:   .70      ECMO Calculated CO:  4.0-4.9  DO2/VO2:   VO2/DO2:     - 9/14 right groin cannula pulled-back ~3-4cm to position tip of cannula just inferior to hepatic vein   - clinically perfusing. Lactate: 1.4  - ECMO sweep sighing q1hr  - Adjust circuit flow as tolerated with DO2:VO2 goal >2  - Monitor for hemolysis, chatter, evidence of recirculation       CARDIOVASCULAR  Hx of Afib w at OSH, started on Amiodarone gtt now in shock state requiring requiring pressors likely septic with component of vaspoplegia i/s/o sedation, possible contribution of cardiogenic from RV dysfunction   - No PE seen on invasive pulmonary angiography at time of ECMO cannulation or in recent imaging  - NO2 weaned off  (9/15) RV function remains unchanged  - s/p milrinone, off since (9/13)  - requiring pressors, continue to wean norepinephrine to maintain map > 65  - Roosevelt off since ( 9/14) and Vaso off since (9/15)  - converted to sinus (9/14) discontinued amio gtt iso bradycardia   - TTE 9/12 with EF 65-70% with normal LV and RV  - TTE 9/14 with  EF 18%. Severe global LV systolic dysfunction. RV enlargement with decreased RV systolic function, however RITCHIE and recent POCUS shows normal LV systolic function, improved RV systolic function RV< LV . LVOT VTI 20.9 cm. IVC 2.3 cm.        RENAL  sCr baseline 0.78  - Creatinine wnl, 0.83 today   - monitor i/o's, negative -60ml LOS/ negative -1.1L 24hrs  - s/p diuresis with lasix, last administered 9/15  - now with hemoconcentrator to assist with fluid removal, goal to keep 1-2L net negative  - indwelling catheter in place, making good UO  - monitor, Replete to K>4, Phos>3, Mg>2, iCa>1  - metabolic alkalosis, ?contraction      GASTROINTESTINAL  Transaminase elevation likely 2/2 combination of shock liver, malposition of ECMO cannula and liver dysfunction  - ALK normal 67, AST downtrending but remain elevated 525, ALT continues to rise 992, T.bili remains elevated 5 (mostly direct)  - Acute hepatitis panel negative  - RUQ US 9/15 shows fatty infiltration of liver, with no cholelithiasis or biliary ductal dilatation.  - Triglycerides elevated 836, on insulin gtt for hyperglycemia, now downtrending 406 > 271  - tolerating tube feeds via NGT   - no BM reported since admission, can give reglan  IVP x 3 doses for gastroparesis, QTc  416  - continue bowel regimen senna, miralax and movantik to avoid opioid induced constipation  - CT abdomen 9/15 w/o bowel obstruction, noted with colonic diverticulosis mostly in sigmoid, w/o evidence of diverticulitis  - continue PPI iso steroids   - nutrition following  - feeds held yesterday following vomiting     INFECTIOUS DISEASE  Neutropenia likely transient? vs drug induced vs infection vs malignancy?  - WBC 0.98, remains afebrile however temperature has been regulated via ECMO circuit, will turn off and monitor for fever  - Bactrim DS discontinued iso neutropenia, will start Mepron for PJP prophylaxis instead  - can repeat blood and sputum cultures 9/16  - heme consult given neutropenia and thrombocytopenia  - c/w empiric donald (9/13- ) for 7 day course  - s/p IV Ceftriaxone 5 days? at [facility] out of an abundance of precaution to cover BAL specimen  - s/p zosyn at OSH (9/12) for lingula PNA  - vancomycin (9/12-9/15 ) d/c'd negative mrsa PCR, and azithro (9/14-9/15) d/c'd neg Urine legionella  - 9/13 RVP negative, urine, sputum and blood cx ngtd  - f/u BAL cyto, cell count, fungitell  - f/u legionella PCR, quantiferon, mycoplasma pneumoniae, pneumocystis jiroveci pcr      ENDOCRINE  # Hyperglycemia i/s/o steroids  Admission A1c 6.1  - hyperglycemia iso steroids, glucose remained elevated despite increase of NPH and ISS  - continue insulin gtt for tighter glycemic control, and   - elevated triglycerides 835, has hx of fatty liver, currently on propofol gtt. TG downtrending since initiating insulin gtt for hyperglycemia, now 406> 271, continue to monitor daily      HEME  # ECMO anticoagulation  - continue argatroban gtt goal aPTT 50-70  - INR elevated likely iso argatroban and liver dysfxn,  -  s/p Vit. K (9/14) and FFP x1 (9/15)  - fibrinogen 237, monitor daily  - Hgb remains stable 11.9  - Transfuse for Hgb <7, Plt<10  - platelets continue to downtrend, 48 today    #Leukopenia  #Thrombocytopenia  - Heme consult placed for thrombocytopenia, noted to also be neutropenic  - unlikely HLH/MAS since many abnormalities can be explained by severe hypoxemia/hypotension during initial decompensation prior to ecmo cannulation but some features that are consistent and with rheumatic disease it is a possibility to f/u hematology regarding utility of further lab/pathologic testing    #R/O DVT  - LE duplex negative for DVT but will repeat  - f/u duplex- pending    MSK  Onset of debilitating b/l hand cramps and some muscles weakness x several months, unclear etiology, radiculopathy? vs myositis?   - MRI outpatient reportedly showed cervical spine issues, started on Prednisone shortly before admission at OSH  - seen by neurologist at [facility]   - symptoms improved with cellcept (9/8-9/11) but discontinued given Afib RVR   - f/u with Dr. Nik Marie or Dante Urbano for EMG upon discharge (osh?)      RHEUM  - started on Solumedrol 60mg q6h from 9/1 to 9/6 then weaned over time to then Medrol 32 mg 9/9 to 9/12 at [facility]  - s/p Cellcept 9/8 to 9/11 but stopped due to Afib- RVR/tachycardia   - CT findings, Improved/decreased extent of bilateral ground glass opacities and reticular markings compared to the previous study. Findings are nonspecific but differential etiologies include interstitial pneumonia, drug toxicity, nonspecific connective tissue disorders.  - OSH labs show strongly positive ARIANA, RNP, and anti smith antibodies with low C4 and normal C3. 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 First session  (9/15), next session planned for Monday (will need to coordinate time with perfusion and blood bank)    SKIN  Reportedly had single episode of painful rash on her shins, ears and neck and chest which resolved with a steroid cream from a dermatologist prior to admission  - no evidence of rash currently      PROPHYLAXIS  # DVT: argatroban  # GI: TF      LINES  -Ecmo Cannulas  -[facility] TLC- 9/13 (placed at OSH)  -Left Axill [place] - 9/13 (placed at OSH)  -RA 16g PIV x2- 9/15      GOC  -Palliative Following    DISPO: ICU  CODE STATUS: Full

## 2023-09-17 NOTE — PROGRESS NOTE ADULT - ATTENDING COMMENTS
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    Patient was intially admitted to [facility] on 8/26 after p/w SOB and found to be hypoxemic. Of note, as per chart review and wife, patient has had SOB  for several months. They stayed in [place] from 11/2022 to 8/20/2023 and while there, patient noted onset of chronic SOB several months ago. Also endorse onset of debilitating b/l hand cramps and some muscles weakness several months as well. Patient went to see a neurologist and had an MRI which reportedly showed cervical spine issues for which she was recently started on Prednisone shortly before admission. Also endorses single episode of painful rash on her shins, ears and neck and chest which resolved with a steroid cream from a dermatologist.     During admission at [facility], patient underwent bronchoscopy with TBBX on 8/30 which showed NSIP/OP. Labs notable for positive ARIANA 1:1280, RNP > 8, Alejandro > 8. Patient was started on Solumedrol 60mg q6h from 9/1 to 9/6 then weaned over time to then Medrol 32 mg 9/9 to 9/12. Patient was also on Cellcept 9/8 to 9/11 but was stopped due to tachycardia. During this time, patient was on 2 to 4 L nc. Interval CTA chest on 9/11 also showed improved in reticular findings and diffuse GGO. On 9/12 early AM, patient had episode of SVT to 170s with then rapidly progressive hypoxemic respiratory failure leading to intubation this AM 9/13. Patient was restarted on empiric abx with Vanc and Zosyn on 9/12. Despite best practices, patient remamined hypoxemic and patient was cannulated for VV-ECMO on 9/13 and transferred to [facility].    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 Differential includes bacterial PNA, atypical PNA, aspiration. Angiogram during cannulation 9/13 negative for PE  #VV-ECMO  #Afib w/ RVR - Spontaneous cardioversion on Amio gtt  #Oropharyngeal dysphagia  #Hyperglycemia  - c/w vasopressors to maintain MAP > 65. Vasopressors continue to improve off milrinone stopped shortly after arrival, now only on Levo. Will monitor RV size and function with serial TTE/RITCHIE. Still mild to moderate RV enlargement but improved RV systolic function  - Spontaneous cardioversion to sinus s/p amiodarone gtt. No MEREDITH thrombus on RITCHIE on 9/13. Monitor on tele  - c/w sedation, currently on Dilaudid and Propofol. Paralytics if unable to keep concordant with vent. Daily sedation vacation  - c/w mechanical ventilatory support, currently on rest settings PC RR 12, PS 12, PEEP 12, 100%, TV slightly improved to about 70 ccs today  - Weaned NO off yesterday, with close monitoring of RV size and function  - c/w VV-ECMO support, 25 Fr RIJ and 19Fr RFem. Pull cannula is appropriately positioned after pull back on 9/14. Venous SO2 high 70s to low 80s. Currently on Sweep 4, Flow 3.8.  - f/u BAL cyto, cell count, cultures, PCP PCR, fungitell, aspergillus  - f/u blood and urine cultures. MRSA PCR negative, urine legionella negative. RVP negative  - c/w empiric Wilmer, plan for 7 day course if cultures negative  - Will change bactrim ppx to mepron due to bicytopenia  - s/p pulse dose steroids x 3 days, now on 1mg/kg daily. s/p PLEX x1 on 9/15. Now getting Rituxan today. Discussed with blood bank attending, will plan for next PLEX for Monday AM to allow Rituxan to work. Will touch base with perfusion. f/u Rheum recs  - f/u additional Rheum labs re APLS  - Trend LFTs for shock liver, peaked but T bili/D bili still elevated. CTAP and RUQ US negative for hepatobiliary pathology. Acute hepatitis panel negative  - c/w Argatroban gtt, tranfuse as needed. Trend LDH and haptoglobin  - c/w insulin gtt for persistent hyperglycemia, improved  - Hemoconcentrator for goal net negative 1 to 12 L today  - c/w bowel regimen, add Reglan x 24 hours  - c/w PT/OT for passive ROM exercises  - Palliative eval appreciated    #Leukopenia - Unclear etiology.  #Thrombocytopenia  - Monitor CBC, trend  - Will repeat blood cultures in case infectious  - Heme consult  - Will change bactrim ppx to mepron    #GOC - Full Code    #DVT ppx - Argatroban gtt    #POCUS - See POCUS note    Total time spent on VV ECMO management was 30 minutes, separate from time spent on critical care management, procedures, and teaching.    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    Patient was intially admitted to [facility] on 8/26 after p/w SOB and found to be hypoxemic. Of note, as per chart review and wife, patient has had SOB  for several months. They stayed in [place] from 11/2022 to 8/20/2023 and while there, patient noted onset of chronic SOB several months ago. Also endorse onset of debilitating b/l hand cramps and some muscles weakness several months as well. Patient went to see a neurologist and had an MRI which reportedly showed cervical spine issues for which she was recently started on Prednisone shortly before admission. Also endorses single episode of painful rash on her shins, ears and neck and chest which resolved with a steroid cream from a dermatologist.     During admission at [facility], patient underwent bronchoscopy with TBBX on 8/30 which showed NSIP/OP. Labs notable for positive ARIANA 1:1280, RNP > 8, Alejandro > 8. Patient was started on Solumedrol 60mg q6h from 9/1 to 9/6 then weaned over time to then Medrol 32 mg 9/9 to 9/12. Patient was also on Cellcept 9/8 to 9/11 but was stopped due to tachycardia. During this time, patient was on 2 to 4 L nc. Interval CTA chest on 9/11 also showed improved in reticular findings and diffuse GGO. On 9/12 early AM, patient had episode of SVT to 170s with then rapidly progressive hypoxemic respiratory failure leading to intubation this AM 9/13. Patient was restarted on empiric abx with Vanc and Zosyn on 9/12. Despite best practices, patient remamined hypoxemic and patient was cannulated for VV-ECMO on 9/13 and transferred to [facility].    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 Differential includes bacterial PNA, atypical PNA, aspiration. Angiogram during cannulation 9/13 negative for PE  #VV-ECMO  #Afib w/ RVR - Spontaneous cardioversion on Amio gtt now off  #Oropharyngeal dysphagia  #Hyperglycemia  - c/w vasopressors to maintain MAP > 65. Vasopressors continue to improve off milrinone stopped shortly after arrival, now only on Levo. Will monitor RV size and function with serial TTE/RITCHIE. Still mild  RV enlargement but improved RV systolic function  - Spontaneous cardioversion to sinus s/p amiodarone gtt. No MEREDITH thrombus on RITCHIE on 9/13. Monitor on tele  - c/w sedation, currently on Dilaudid and Propofol. Wean sedation, Precedex as needed  - c/w mechanical ventilatory support, currently on rest settings PC RR 12, PS 12, PEEP 12, 100%, TV slightly improved to about 90 ccs  - Weaned NO off  - c/w VV-ECMO support, 25 Fr RIJ and 19Fr RFem. Pull cannula is appropriately positioned after pull back on 9/14. Venous SO2 high 70s to low 80s. Currently on Sweep 3.5, Flow 3.8.  - f/u BAL cyto. Cultures NGTD. PCP PCR, fungitell negative. f/u aspergillus  - f/u blood and urine cultures. MRSA PCR negative, urine legionella negative. RVP negative  - c/w empiric Wilmer, plan for 7 day course if cultures negative  - Changeed bactrim ppx to mepron due to bicytopenia. Heme consulted. Will start Zaroxio today. Will transfuse plts x2 today.  - s/p pulse dose steroids x 3 days, now on 1mg/kg daily. s/p PLEX x1 on 9/15. s/p Rituxan 9/16. Discussed with blood bank attending, will plan for next PLEX for Monday AM. Will find out timing and touch base with perfusion. f/u Rheum recs  - f/u additional Rheum labs re APLS  - Trend LFTs for shock liver, peaked but T bili/D bili still elevated. CTAP and RUQ US negative for hepatobiliary pathology. Acute hepatitis panel negative  - c/w Argatroban gtt, tranfuse as needed. Trend LDH and haptoglobin  - c/w insulin gtt for persistent hyperglycemia, improved  - Hemoconcentrator for goal net negative even today, given hyperNa and contraction alkalosis  - c/w bowel regimen, add Reglan for additional x 24 hours. Restart trickle feeds  - TSH low at 0.13 and Free T4 slightly low 0.7. Will repeat in a few days  - c/w PT/OT for passive ROM exercises  - Palliative eval appreciated    #Leukopenia - Unclear etiology.  #Thrombocytopenia  - Monitor CBC, trend  - Will repeat blood cultures in case infectious  - Heme consult  - Will change bactrim ppx to mepron    #GOC - Full Code    #DVT ppx - Argatroban gtt    #POCUS - See POCUS note    Total time spent on VV ECMO management was 30 minutes, separate from time spent on critical care management, procedures, and teaching.    Jozef Borden MD  Pulmonary & Critical Care

## 2023-09-17 NOTE — PHARMACOTHERAPY INTERVENTION NOTE - COMMENTS
spoke with Dr. Will - patient has standing active order for solu-medrol 125mg daily; clarify if solu-medrol 100mg is still needed for premedication.  Demerol was written as "25mg prn".  Clarified which formulation of Demerol (IV or PO).  Standard reaction medications for Rituximab were left out.  Suggested a corticosteroid and another antihistamine.  Provider was okay with benadryl 50mg iv x1 dose; If the patient needed another corticosteroid, MICU will need to contact Rheumatology.

## 2023-09-18 LAB
ALBUMIN SERPL ELPH-MCNC: 3 G/DL — LOW (ref 3.3–5)
ALBUMIN SERPL ELPH-MCNC: 3.1 G/DL — LOW (ref 3.3–5)
ALBUMIN SERPL ELPH-MCNC: 3.2 G/DL — LOW (ref 3.3–5)
ALBUMIN SERPL ELPH-MCNC: 3.2 G/DL — LOW (ref 3.3–5)
ALP SERPL-CCNC: 106 U/L — SIGNIFICANT CHANGE UP (ref 40–120)
ALP SERPL-CCNC: 126 U/L — HIGH (ref 40–120)
ALP SERPL-CCNC: 127 U/L — HIGH (ref 40–120)
ALP SERPL-CCNC: 96 U/L — SIGNIFICANT CHANGE UP (ref 40–120)
ALT FLD-CCNC: 363 U/L — HIGH (ref 4–33)
ALT FLD-CCNC: 738 U/L — HIGH (ref 4–33)
ALT FLD-CCNC: 760 U/L — HIGH (ref 4–33)
ALT FLD-CCNC: 894 U/L — HIGH (ref 4–33)
ANION GAP SERPL CALC-SCNC: 8 MMOL/L — SIGNIFICANT CHANGE UP (ref 7–14)
ANION GAP SERPL CALC-SCNC: 8 MMOL/L — SIGNIFICANT CHANGE UP (ref 7–14)
ANION GAP SERPL CALC-SCNC: 9 MMOL/L — SIGNIFICANT CHANGE UP (ref 7–14)
ANION GAP SERPL CALC-SCNC: 9 MMOL/L — SIGNIFICANT CHANGE UP (ref 7–14)
APTT BLD: 51.1 SEC — HIGH (ref 24.5–35.6)
APTT BLD: 52.2 SEC — HIGH (ref 24.5–35.6)
APTT BLD: 54.6 SEC — HIGH (ref 24.5–35.6)
APTT BLD: 54.8 SEC — HIGH (ref 24.5–35.6)
AST SERPL-CCNC: 139 U/L — HIGH (ref 4–32)
AST SERPL-CCNC: 222 U/L — HIGH (ref 4–32)
AST SERPL-CCNC: 240 U/L — HIGH (ref 4–32)
AST SERPL-CCNC: 249 U/L — HIGH (ref 4–32)
BASE EXCESS BLDCOV CALC-SCNC: 11.5 MMOL/L — HIGH (ref -3–2)
BASOPHILS # BLD AUTO: 0 K/UL — SIGNIFICANT CHANGE UP (ref 0–0.2)
BASOPHILS NFR BLD AUTO: 0 % — SIGNIFICANT CHANGE UP (ref 0–2)
BILIRUB DIRECT SERPL-MCNC: 8.7 MG/DL — HIGH (ref 0–0.3)
BILIRUB DIRECT SERPL-MCNC: 8.7 MG/DL — HIGH (ref 0–0.3)
BILIRUB INDIRECT FLD-MCNC: 1.1 MG/DL — HIGH (ref 0–1)
BILIRUB SERPL-MCNC: 10.8 MG/DL — HIGH (ref 0.2–1.2)
BILIRUB SERPL-MCNC: 7.7 MG/DL — HIGH (ref 0.2–1.2)
BILIRUB SERPL-MCNC: 9.1 MG/DL — HIGH (ref 0.2–1.2)
BILIRUB SERPL-MCNC: 9.8 MG/DL — HIGH (ref 0.2–1.2)
BILIRUB SERPL-MCNC: 9.8 MG/DL — HIGH (ref 0.2–1.2)
BLOOD GAS ARTERIAL - LYTES,HGB,ICA,LACT RESULT: SIGNIFICANT CHANGE UP
BLOOD GAS ARTERIAL COMPREHENSIVE RESULT: SIGNIFICANT CHANGE UP
BLOOD GAS ECMO POST MEMBRANE - ARTERIAL RESULT: SIGNIFICANT CHANGE UP
BLOOD GAS ECMO PRE MEMBRANE - VENOUS RESULT: SIGNIFICANT CHANGE UP
BLOOD GAS PRE MEMBRANE - GLUCOSE: 123 MG/DL — HIGH (ref 70–99)
BLOOD GAS PRE MEMBRANE - ICALCIUM: 1.11 MMOL/L — LOW (ref 1.15–1.29)
BLOOD GAS PRE MEMBRANE - POTASSIUM: 4.2 MMOL/L — SIGNIFICANT CHANGE UP (ref 3.4–4.5)
BLOOD GAS PRE MEMBRANE - SODIUM: 143 MMOL/L — SIGNIFICANT CHANGE UP (ref 136–146)
BUN SERPL-MCNC: 47 MG/DL — HIGH (ref 7–23)
BUN SERPL-MCNC: 48 MG/DL — HIGH (ref 7–23)
BUN SERPL-MCNC: 48 MG/DL — HIGH (ref 7–23)
BUN SERPL-MCNC: 51 MG/DL — HIGH (ref 7–23)
CALCIUM SERPL-MCNC: 8.4 MG/DL — SIGNIFICANT CHANGE UP (ref 8.4–10.5)
CALCIUM SERPL-MCNC: 8.5 MG/DL — SIGNIFICANT CHANGE UP (ref 8.4–10.5)
CALCIUM SERPL-MCNC: 8.6 MG/DL — SIGNIFICANT CHANGE UP (ref 8.4–10.5)
CALCIUM SERPL-MCNC: 8.8 MG/DL — SIGNIFICANT CHANGE UP (ref 8.4–10.5)
CHLORIDE SERPL-SCNC: 103 MMOL/L — SIGNIFICANT CHANGE UP (ref 98–107)
CHLORIDE SERPL-SCNC: 104 MMOL/L — SIGNIFICANT CHANGE UP (ref 98–107)
CO2 SERPL-SCNC: 33 MMOL/L — HIGH (ref 22–31)
CO2 SERPL-SCNC: 34 MMOL/L — HIGH (ref 22–31)
CO2 SERPL-SCNC: 34 MMOL/L — HIGH (ref 22–31)
CO2 SERPL-SCNC: 35 MMOL/L — HIGH (ref 22–31)
COHGB MFR BLDA: 2.7 % — HIGH (ref 0.5–1.5)
CREAT SERPL-MCNC: 0.45 MG/DL — LOW (ref 0.5–1.3)
CREAT SERPL-MCNC: 0.47 MG/DL — LOW (ref 0.5–1.3)
CREAT SERPL-MCNC: 0.49 MG/DL — LOW (ref 0.5–1.3)
CREAT SERPL-MCNC: 0.52 MG/DL — SIGNIFICANT CHANGE UP (ref 0.5–1.3)
CULTURE RESULTS: SIGNIFICANT CHANGE UP
EGFR: 104 ML/MIN/1.73M2 — SIGNIFICANT CHANGE UP
EGFR: 105 ML/MIN/1.73M2 — SIGNIFICANT CHANGE UP
EGFR: 106 ML/MIN/1.73M2 — SIGNIFICANT CHANGE UP
EGFR: 107 ML/MIN/1.73M2 — SIGNIFICANT CHANGE UP
EOSINOPHIL # BLD AUTO: 0 K/UL — SIGNIFICANT CHANGE UP (ref 0–0.5)
EOSINOPHIL NFR BLD AUTO: 0 % — SIGNIFICANT CHANGE UP (ref 0–6)
GLUCOSE BLDC GLUCOMTR-MCNC: 106 MG/DL — HIGH (ref 70–99)
GLUCOSE BLDC GLUCOMTR-MCNC: 117 MG/DL — HIGH (ref 70–99)
GLUCOSE BLDC GLUCOMTR-MCNC: 125 MG/DL — HIGH (ref 70–99)
GLUCOSE BLDC GLUCOMTR-MCNC: 127 MG/DL — HIGH (ref 70–99)
GLUCOSE BLDC GLUCOMTR-MCNC: 128 MG/DL — HIGH (ref 70–99)
GLUCOSE BLDC GLUCOMTR-MCNC: 135 MG/DL — HIGH (ref 70–99)
GLUCOSE BLDC GLUCOMTR-MCNC: 140 MG/DL — HIGH (ref 70–99)
GLUCOSE BLDC GLUCOMTR-MCNC: 145 MG/DL — HIGH (ref 70–99)
GLUCOSE BLDC GLUCOMTR-MCNC: 146 MG/DL — HIGH (ref 70–99)
GLUCOSE BLDC GLUCOMTR-MCNC: 155 MG/DL — HIGH (ref 70–99)
GLUCOSE BLDC GLUCOMTR-MCNC: 156 MG/DL — HIGH (ref 70–99)
GLUCOSE BLDC GLUCOMTR-MCNC: 156 MG/DL — HIGH (ref 70–99)
GLUCOSE BLDC GLUCOMTR-MCNC: 157 MG/DL — HIGH (ref 70–99)
GLUCOSE BLDC GLUCOMTR-MCNC: 157 MG/DL — HIGH (ref 70–99)
GLUCOSE BLDC GLUCOMTR-MCNC: 160 MG/DL — HIGH (ref 70–99)
GLUCOSE BLDC GLUCOMTR-MCNC: 163 MG/DL — HIGH (ref 70–99)
GLUCOSE BLDC GLUCOMTR-MCNC: 166 MG/DL — HIGH (ref 70–99)
GLUCOSE BLDC GLUCOMTR-MCNC: 178 MG/DL — HIGH (ref 70–99)
GLUCOSE BLDC GLUCOMTR-MCNC: 180 MG/DL — HIGH (ref 70–99)
GLUCOSE BLDC GLUCOMTR-MCNC: 186 MG/DL — HIGH (ref 70–99)
GLUCOSE BLDC GLUCOMTR-MCNC: 189 MG/DL — HIGH (ref 70–99)
GLUCOSE BLDC GLUCOMTR-MCNC: 202 MG/DL — HIGH (ref 70–99)
GLUCOSE BLDC GLUCOMTR-MCNC: 205 MG/DL — HIGH (ref 70–99)
GLUCOSE BLDC GLUCOMTR-MCNC: 209 MG/DL — HIGH (ref 70–99)
GLUCOSE BLDC GLUCOMTR-MCNC: 210 MG/DL — HIGH (ref 70–99)
GLUCOSE SERPL-MCNC: 133 MG/DL — HIGH (ref 70–99)
GLUCOSE SERPL-MCNC: 173 MG/DL — HIGH (ref 70–99)
GLUCOSE SERPL-MCNC: 173 MG/DL — HIGH (ref 70–99)
GLUCOSE SERPL-MCNC: 209 MG/DL — HIGH (ref 70–99)
HAPTOGLOB SERPL-MCNC: <20 MG/DL — LOW (ref 34–200)
HCO3, PRE MEMBRANE VENOUS: 38 MMOL/L — HIGH (ref 20–27)
HCT VFR BLD CALC: 34.5 % — SIGNIFICANT CHANGE UP (ref 34.5–45)
HCT VFR BLD CALC: 34.8 % — SIGNIFICANT CHANGE UP (ref 34.5–45)
HCT VFR BLD CALC: 35.5 % — SIGNIFICANT CHANGE UP (ref 34.5–45)
HCT VFR BLD CALC: 36.7 % — SIGNIFICANT CHANGE UP (ref 34.5–45)
HGB BLD-MCNC: 10.9 G/DL — LOW (ref 11.5–15.5)
HGB BLD-MCNC: 11.1 G/DL — LOW (ref 11.5–15.5)
HGB BLD-MCNC: 11.3 G/DL — LOW (ref 11.5–15.5)
HGB BLD-MCNC: 11.3 G/DL — LOW (ref 11.5–15.5)
IANC: 0.01 K/UL — LOW (ref 1.8–7.4)
IANC: 0.01 K/UL — LOW (ref 1.8–7.4)
IANC: 0.02 K/UL — LOW (ref 1.8–7.4)
IANC: 0.02 K/UL — LOW (ref 1.8–7.4)
IMM GRANULOCYTES NFR BLD AUTO: 0 % — SIGNIFICANT CHANGE UP (ref 0–0.9)
INR BLD: 1.47 RATIO — HIGH (ref 0.85–1.18)
INR BLD: 1.58 RATIO — HIGH (ref 0.85–1.18)
LACTATE, PRE MEMBRANE VENOUS: 2.1 MMOL/L — HIGH (ref 0.5–2)
LDH SERPL L TO P-CCNC: 584 U/L — HIGH (ref 135–225)
LYMPHOCYTES # BLD AUTO: 0.08 K/UL — LOW (ref 1–3.3)
LYMPHOCYTES # BLD AUTO: 0.09 K/UL — LOW (ref 1–3.3)
LYMPHOCYTES # BLD AUTO: 0.11 K/UL — LOW (ref 1–3.3)
LYMPHOCYTES # BLD AUTO: 0.13 K/UL — LOW (ref 1–3.3)
LYMPHOCYTES # BLD AUTO: 38.1 % — SIGNIFICANT CHANGE UP (ref 13–44)
LYMPHOCYTES # BLD AUTO: 47.4 % — HIGH (ref 13–44)
LYMPHOCYTES # BLD AUTO: 52.4 % — HIGH (ref 13–44)
LYMPHOCYTES # BLD AUTO: 64 % — HIGH (ref 13–44)
MAGNESIUM SERPL-MCNC: 2.8 MG/DL — HIGH (ref 1.6–2.6)
MAGNESIUM SERPL-MCNC: 3 MG/DL — HIGH (ref 1.6–2.6)
MCHC RBC-ENTMCNC: 29.7 PG — SIGNIFICANT CHANGE UP (ref 27–34)
MCHC RBC-ENTMCNC: 30.1 PG — SIGNIFICANT CHANGE UP (ref 27–34)
MCHC RBC-ENTMCNC: 30.3 PG — SIGNIFICANT CHANGE UP (ref 27–34)
MCHC RBC-ENTMCNC: 30.5 PG — SIGNIFICANT CHANGE UP (ref 27–34)
MCHC RBC-ENTMCNC: 30.8 GM/DL — LOW (ref 32–36)
MCHC RBC-ENTMCNC: 31.6 GM/DL — LOW (ref 32–36)
MCHC RBC-ENTMCNC: 31.8 GM/DL — LOW (ref 32–36)
MCHC RBC-ENTMCNC: 31.9 GM/DL — LOW (ref 32–36)
MCV RBC AUTO: 94.7 FL — SIGNIFICANT CHANGE UP (ref 80–100)
MCV RBC AUTO: 95.6 FL — SIGNIFICANT CHANGE UP (ref 80–100)
MCV RBC AUTO: 95.8 FL — SIGNIFICANT CHANGE UP (ref 80–100)
MCV RBC AUTO: 96.3 FL — SIGNIFICANT CHANGE UP (ref 80–100)
METHGB MFR BLDMV: 0.7 % — SIGNIFICANT CHANGE UP (ref 0–1.5)
MONOCYTES # BLD AUTO: 0.05 K/UL — SIGNIFICANT CHANGE UP (ref 0–0.9)
MONOCYTES # BLD AUTO: 0.08 K/UL — SIGNIFICANT CHANGE UP (ref 0–0.9)
MONOCYTES # BLD AUTO: 0.09 K/UL — SIGNIFICANT CHANGE UP (ref 0–0.9)
MONOCYTES # BLD AUTO: 0.11 K/UL — SIGNIFICANT CHANGE UP (ref 0–0.9)
MONOCYTES NFR BLD AUTO: 24 % — HIGH (ref 2–14)
MONOCYTES NFR BLD AUTO: 42.1 % — HIGH (ref 2–14)
MONOCYTES NFR BLD AUTO: 42.9 % — HIGH (ref 2–14)
MONOCYTES NFR BLD AUTO: 52.4 % — HIGH (ref 2–14)
NEUTROPHILS # BLD AUTO: 0.01 K/UL — LOW (ref 1.8–7.4)
NEUTROPHILS # BLD AUTO: 0.01 K/UL — LOW (ref 1.8–7.4)
NEUTROPHILS # BLD AUTO: 0.02 K/UL — LOW (ref 1.8–7.4)
NEUTROPHILS # BLD AUTO: 0.02 K/UL — LOW (ref 1.8–7.4)
NEUTROPHILS NFR BLD AUTO: 10.5 % — LOW (ref 43–77)
NEUTROPHILS NFR BLD AUTO: 4 % — LOW (ref 43–77)
NEUTROPHILS NFR BLD AUTO: 4.7 % — LOW (ref 43–77)
NEUTROPHILS NFR BLD AUTO: 9.5 % — LOW (ref 43–77)
NRBC # BLD: 33 /100 WBCS — HIGH (ref 0–0)
NRBC # BLD: 37 /100 WBCS — HIGH (ref 0–0)
NRBC # BLD: 38 /100 WBCS — HIGH (ref 0–0)
NRBC # FLD: 0.07 K/UL — HIGH (ref 0–0)
NRBC # FLD: 0.07 K/UL — HIGH (ref 0–0)
NRBC # FLD: 0.08 K/UL — HIGH (ref 0–0)
OXYGEN SATURATION, PRE MEMBRANE VENOUS: 88.9 % — HIGH (ref 60–85)
OXYHEMOGLOBIN, PRE MEMBRANE VENOUS: 85.9 % — HIGH (ref 59–84)
PCO2, PRE MEMBRANE VENOUS: 57 MMHG — HIGH (ref 39–42)
PH, PRE MEMBRANE VENOUS: 7.43 — SIGNIFICANT CHANGE UP (ref 7.32–7.43)
PHOSPHATE SERPL-MCNC: 2.9 MG/DL — SIGNIFICANT CHANGE UP (ref 2.5–4.5)
PHOSPHATE SERPL-MCNC: 3.2 MG/DL — SIGNIFICANT CHANGE UP (ref 2.5–4.5)
PLATELET # BLD AUTO: 31 K/UL — LOW (ref 150–400)
PLATELET # BLD AUTO: 46 K/UL — LOW (ref 150–400)
PLATELET # BLD AUTO: 47 K/UL — LOW (ref 150–400)
PLATELET # BLD AUTO: 51 K/UL — LOW (ref 150–400)
PO2, PRE MEMBRANE VENOUS: 62 MMHG — HIGH (ref 35–40)
POTASSIUM SERPL-MCNC: 4.1 MMOL/L — SIGNIFICANT CHANGE UP (ref 3.5–5.3)
POTASSIUM SERPL-MCNC: 4.2 MMOL/L — SIGNIFICANT CHANGE UP (ref 3.5–5.3)
POTASSIUM SERPL-MCNC: 4.3 MMOL/L — SIGNIFICANT CHANGE UP (ref 3.5–5.3)
POTASSIUM SERPL-MCNC: 4.5 MMOL/L — SIGNIFICANT CHANGE UP (ref 3.5–5.3)
POTASSIUM SERPL-SCNC: 4.1 MMOL/L — SIGNIFICANT CHANGE UP (ref 3.5–5.3)
POTASSIUM SERPL-SCNC: 4.2 MMOL/L — SIGNIFICANT CHANGE UP (ref 3.5–5.3)
POTASSIUM SERPL-SCNC: 4.3 MMOL/L — SIGNIFICANT CHANGE UP (ref 3.5–5.3)
POTASSIUM SERPL-SCNC: 4.5 MMOL/L — SIGNIFICANT CHANGE UP (ref 3.5–5.3)
PROT SERPL-MCNC: 4.7 G/DL — LOW (ref 6–8.3)
PROT SERPL-MCNC: 5.1 G/DL — LOW (ref 6–8.3)
PROTHROM AB SERPL-ACNC: 16.3 SEC — HIGH (ref 9.5–13)
PROTHROM AB SERPL-ACNC: 17.5 SEC — HIGH (ref 9.5–13)
RBC # BLD: 3.6 M/UL — LOW (ref 3.8–5.2)
RBC # BLD: 3.64 M/UL — LOW (ref 3.8–5.2)
RBC # BLD: 3.75 M/UL — LOW (ref 3.8–5.2)
RBC # BLD: 3.81 M/UL — SIGNIFICANT CHANGE UP (ref 3.8–5.2)
RBC # FLD: 15.9 % — HIGH (ref 10.3–14.5)
RBC # FLD: 16 % — HIGH (ref 10.3–14.5)
RBC # FLD: 16 % — HIGH (ref 10.3–14.5)
RBC # FLD: 16.1 % — HIGH (ref 10.3–14.5)
SODIUM SERPL-SCNC: 145 MMOL/L — SIGNIFICANT CHANGE UP (ref 135–145)
SODIUM SERPL-SCNC: 146 MMOL/L — HIGH (ref 135–145)
SODIUM SERPL-SCNC: 147 MMOL/L — HIGH (ref 135–145)
SODIUM SERPL-SCNC: 147 MMOL/L — HIGH (ref 135–145)
SPECIMEN SOURCE: SIGNIFICANT CHANGE UP
TOTAL HEMOGLOBIN, PRE MEMBRANE VENOUS: 11.9 G/DL — SIGNIFICANT CHANGE UP (ref 11.5–15.5)
TRIGL SERPL-MCNC: 173 MG/DL — HIGH
TSH SERPL-MCNC: 1.3 UIU/ML — SIGNIFICANT CHANGE UP (ref 0.27–4.2)
WBC # BLD: 0.19 K/UL — CRITICAL LOW (ref 3.8–10.5)
WBC # BLD: 0.2 K/UL — CRITICAL LOW (ref 3.8–10.5)
WBC # BLD: 0.21 K/UL — CRITICAL LOW (ref 3.8–10.5)
WBC # BLD: 0.21 K/UL — CRITICAL LOW (ref 3.8–10.5)
WBC # FLD AUTO: 0.19 K/UL — CRITICAL LOW (ref 3.8–10.5)
WBC # FLD AUTO: 0.2 K/UL — CRITICAL LOW (ref 3.8–10.5)
WBC # FLD AUTO: 0.21 K/UL — CRITICAL LOW (ref 3.8–10.5)
WBC # FLD AUTO: 0.21 K/UL — CRITICAL LOW (ref 3.8–10.5)

## 2023-09-18 PROCEDURE — 99222 1ST HOSP IP/OBS MODERATE 55: CPT | Mod: GC

## 2023-09-18 PROCEDURE — 33948 ECMO/ECLS DAILY MGMT-VENOUS: CPT | Mod: GC

## 2023-09-18 PROCEDURE — 99291 CRITICAL CARE FIRST HOUR: CPT | Mod: GC

## 2023-09-18 PROCEDURE — 99292 CRITICAL CARE ADDL 30 MIN: CPT | Mod: GC

## 2023-09-18 PROCEDURE — 99233 SBSQ HOSP IP/OBS HIGH 50: CPT

## 2023-09-18 PROCEDURE — 36514 APHERESIS PLASMA: CPT

## 2023-09-18 PROCEDURE — 76705 ECHO EXAM OF ABDOMEN: CPT | Mod: 26

## 2023-09-18 PROCEDURE — 93970 EXTREMITY STUDY: CPT | Mod: 26

## 2023-09-18 PROCEDURE — 99222 1ST HOSP IP/OBS MODERATE 55: CPT

## 2023-09-18 PROCEDURE — 99233 SBSQ HOSP IP/OBS HIGH 50: CPT | Mod: GC

## 2023-09-18 RX ORDER — VANCOMYCIN HCL 1 G
VIAL (EA) INTRAVENOUS
Refills: 0 | Status: DISCONTINUED | OUTPATIENT
Start: 2023-09-18 | End: 2023-09-19

## 2023-09-18 RX ORDER — VANCOMYCIN HCL 1 G
1750 VIAL (EA) INTRAVENOUS ONCE
Refills: 0 | Status: COMPLETED | OUTPATIENT
Start: 2023-09-18 | End: 2023-09-18

## 2023-09-18 RX ORDER — VANCOMYCIN HCL 1 G
1750 VIAL (EA) INTRAVENOUS EVERY 12 HOURS
Refills: 0 | Status: DISCONTINUED | OUTPATIENT
Start: 2023-09-19 | End: 2023-09-19

## 2023-09-18 RX ORDER — CALCIUM GLUCONATE 100 MG/ML
2 VIAL (ML) INTRAVENOUS ONCE
Refills: 0 | Status: COMPLETED | OUTPATIENT
Start: 2023-09-18 | End: 2023-09-18

## 2023-09-18 RX ORDER — CALCIUM GLUCONATE 100 MG/ML
2 VIAL (ML) INTRAVENOUS ONCE
Refills: 0 | Status: DISCONTINUED | OUTPATIENT
Start: 2023-09-18 | End: 2023-09-18

## 2023-09-18 RX ADMIN — Medication 3 MILLILITER(S): at 03:50

## 2023-09-18 RX ADMIN — CHLORHEXIDINE GLUCONATE 15 MILLILITER(S): 213 SOLUTION TOPICAL at 18:19

## 2023-09-18 RX ADMIN — PANTOPRAZOLE SODIUM 40 MILLIGRAM(S): 20 TABLET, DELAYED RELEASE ORAL at 12:18

## 2023-09-18 RX ADMIN — ARGATROBAN 1.15 MICROGRAM(S)/KG/MIN: 50 INJECTION, SOLUTION INTRAVENOUS at 18:20

## 2023-09-18 RX ADMIN — SENNA PLUS 10 MILLILITER(S): 8.6 TABLET ORAL at 05:39

## 2023-09-18 RX ADMIN — Medication 125 MILLIGRAM(S): at 05:55

## 2023-09-18 RX ADMIN — MEROPENEM 100 MILLIGRAM(S): 1 INJECTION INTRAVENOUS at 22:17

## 2023-09-18 RX ADMIN — Medication 1 DROP(S): at 18:21

## 2023-09-18 RX ADMIN — ARGATROBAN 1.15 MICROGRAM(S)/KG/MIN: 50 INJECTION, SOLUTION INTRAVENOUS at 07:34

## 2023-09-18 RX ADMIN — PROPOFOL 38.3 MICROGRAM(S)/KG/MIN: 10 INJECTION, EMULSION INTRAVENOUS at 07:34

## 2023-09-18 RX ADMIN — DEXMEDETOMIDINE HYDROCHLORIDE IN 0.9% SODIUM CHLORIDE 6.38 MICROGRAM(S)/KG/HR: 4 INJECTION INTRAVENOUS at 18:21

## 2023-09-18 RX ADMIN — HYDROMORPHONE HYDROCHLORIDE 1 MG/HR: 2 INJECTION INTRAMUSCULAR; INTRAVENOUS; SUBCUTANEOUS at 07:34

## 2023-09-18 RX ADMIN — Medication 480 MICROGRAM(S): at 11:57

## 2023-09-18 RX ADMIN — ATOVAQUONE 1500 MILLIGRAM(S): 750 SUSPENSION ORAL at 11:57

## 2023-09-18 RX ADMIN — MEROPENEM 100 MILLIGRAM(S): 1 INJECTION INTRAVENOUS at 13:35

## 2023-09-18 RX ADMIN — Medication 36.9 MICROGRAM(S)/KG/MIN: at 22:21

## 2023-09-18 RX ADMIN — DEXMEDETOMIDINE HYDROCHLORIDE IN 0.9% SODIUM CHLORIDE 6.38 MICROGRAM(S)/KG/HR: 4 INJECTION INTRAVENOUS at 22:20

## 2023-09-18 RX ADMIN — Medication 15 MILLILITER(S): at 11:57

## 2023-09-18 RX ADMIN — SODIUM CHLORIDE 100 MILLILITER(S): 9 INJECTION, SOLUTION INTRAVENOUS at 07:35

## 2023-09-18 RX ADMIN — Medication 5 MILLIGRAM(S): at 05:39

## 2023-09-18 RX ADMIN — Medication 1 MILLIGRAM(S): at 11:58

## 2023-09-18 RX ADMIN — INSULIN HUMAN 2.8 UNIT(S)/HR: 100 INJECTION, SOLUTION SUBCUTANEOUS at 22:20

## 2023-09-18 RX ADMIN — ARGATROBAN 1.15 MICROGRAM(S)/KG/MIN: 50 INJECTION, SOLUTION INTRAVENOUS at 22:21

## 2023-09-18 RX ADMIN — Medication 50 GRAM(S): at 14:49

## 2023-09-18 RX ADMIN — INSULIN HUMAN 2.8 UNIT(S)/HR: 100 INJECTION, SOLUTION SUBCUTANEOUS at 18:20

## 2023-09-18 RX ADMIN — MEROPENEM 100 MILLIGRAM(S): 1 INJECTION INTRAVENOUS at 05:54

## 2023-09-18 RX ADMIN — Medication 1 DROP(S): at 05:55

## 2023-09-18 RX ADMIN — DEXMEDETOMIDINE HYDROCHLORIDE IN 0.9% SODIUM CHLORIDE 6.38 MICROGRAM(S)/KG/HR: 4 INJECTION INTRAVENOUS at 11:00

## 2023-09-18 RX ADMIN — NALOXEGOL OXALATE 25 MILLIGRAM(S): 12.5 TABLET, FILM COATED ORAL at 11:57

## 2023-09-18 RX ADMIN — CHLORHEXIDINE GLUCONATE 15 MILLILITER(S): 213 SOLUTION TOPICAL at 05:54

## 2023-09-18 RX ADMIN — INSULIN HUMAN 2.8 UNIT(S)/HR: 100 INJECTION, SOLUTION SUBCUTANEOUS at 07:34

## 2023-09-18 RX ADMIN — Medication 3 MILLILITER(S): at 16:21

## 2023-09-18 RX ADMIN — Medication 3 MILLILITER(S): at 11:07

## 2023-09-18 RX ADMIN — Medication 3 MILLILITER(S): at 20:09

## 2023-09-18 RX ADMIN — Medication 250 MILLIGRAM(S): at 14:56

## 2023-09-18 RX ADMIN — POLYETHYLENE GLYCOL 3350 17 GRAM(S): 17 POWDER, FOR SOLUTION ORAL at 13:35

## 2023-09-18 RX ADMIN — POLYETHYLENE GLYCOL 3350 17 GRAM(S): 17 POWDER, FOR SOLUTION ORAL at 06:31

## 2023-09-18 RX ADMIN — CHLORHEXIDINE GLUCONATE 1 APPLICATION(S): 213 SOLUTION TOPICAL at 05:56

## 2023-09-18 NOTE — CONSULT NOTE ADULT - SUBJECTIVE AND OBJECTIVE BOX
Patient is a 62y old  Female who presents with a chief complaint of VV ECMO (18 Sep 2023 12:49)    HPI:  Zo Hager is a 62 year old female with a past medical history of afib who presented to [facility] for shortness of breath.  ID is consulted to evaluate for need of antimicrobial ppx    She had been having dyspnea for several months and had a workup in [place] with a CT scan (which was negative for PE). Per the patient's partner, Kiara, these symptoms developed suddenly beginning around April and have since worsened.  The patient developed a rash which had since resolved.  She began to feel numbness in her hands as well as a heaviness in her legs.  They saw multiple doctors, at which time was determined that she has an autoimmune condition.  She acutely developed further exertional dyspnea and hypoxemia (on home pulse ox to 82%).  She had first presented to urgent care where she had CXR which showed bilateral lower lobe infiltrates so was sent to the ER.  At [facility] she was hypoxic with an appearance of ILD on CT and admitted on 8/26 for ARF.  Pt was started on prednisone on admission with gradually improving O2 requirement.  Started steroid taper on 9/6. Started on Mycophenolate mofetil 9/8 evening but pt reported subjective side effects with weakness. Began to experience AFib with RVR on 9/11 which [id number] to lopressor.  Respiratory function began to decline.  Patient on NRB offered HFNC/BiPAP but refused. Started on empiric vancomycin (9/12 - 9/15) with a dose of zosyn on 9/12.  Per pulm increased solumedrol to 40mg qd. Patient was subsequently transitioned to BiPAP.  CXR showed increased pulmonary congestion for which patient was administered lasix without improvement. Intubated and started on mechanical ventilation but required very high PEEP (18) and 100% FiO2 and still had low saturations. VV ECMO team consulted and accepted to [facility].     Laboratory testing is notable for positive COVID-19 antigen in late August.    Antibiotic History:  Bactrim 9/13-9/16  Meropenem 9/13 -   Azithromycin 9/13  Vancomycin 9/12 - 9/15 (per notes)  Zosyn 9/12 (per notes)        HPI obtained from chart review and speaking with patient's partner, Kiara    REVIEW OF SYSTEMS  Patient intubated and sedated      prior hospital charts reviewed [V]  primary team notes reviewed [V]  other consultant notes reviewed [V]    PAST MEDICAL & SURGICAL HISTORY:      SOCIAL HISTORY:  - Denied smoking/vaping/alcohol/recreational drug use    FAMILY HISTORY:      Allergies  No Known Allergies        ANTIMICROBIALS:  atovaquone  Suspension 1500 daily  meropenem  IVPB    meropenem  IVPB 1000 every 8 hours  vancomycin  IVPB    vancomycin  IVPB 1750 once      ANTIMICROBIALS (past 90 days):  MEDICATIONS  (STANDING):  atovaquone  Suspension   1500 milliGRAM(s) Oral (09-18-23 @ 11:57)   1500 milliGRAM(s) Oral (09-17-23 @ 11:23)    azithromycin  IVPB   255 mL/Hr IV Intermittent (09-13-23 @ 18:14)    azithromycin  IVPB   255 mL/Hr IV Intermittent (09-14-23 @ 17:52)    meropenem  IVPB   100 mL/Hr IV Intermittent (09-13-23 @ 18:14)    meropenem  IVPB   100 mL/Hr IV Intermittent (09-18-23 @ 13:35)   100 mL/Hr IV Intermittent (09-18-23 @ 05:54)   100 mL/Hr IV Intermittent (09-17-23 @ 21:52)   100 mL/Hr IV Intermittent (09-17-23 @ 14:33)   100 mL/Hr IV Intermittent (09-17-23 @ 06:20)   100 mL/Hr IV Intermittent (09-16-23 @ 22:17)   100 mL/Hr IV Intermittent (09-16-23 @ 13:19)   100 mL/Hr IV Intermittent (09-16-23 @ 05:06)   100 mL/Hr IV Intermittent (09-15-23 @ 21:34)   100 mL/Hr IV Intermittent (09-15-23 @ 11:20)   100 mL/Hr IV Intermittent (09-15-23 @ 04:15)   100 mL/Hr IV Intermittent (09-14-23 @ 20:03)   100 mL/Hr IV Intermittent (09-14-23 @ 12:48)   100 mL/Hr IV Intermittent (09-14-23 @ 02:06)    trimethoprim  40 mG/sulfamethoxazole 200 mG Suspension   160 milliGRAM(s) Oral (09-16-23 @ 12:14)   160 milliGRAM(s) Oral (09-15-23 @ 11:20)   160 milliGRAM(s) Oral (09-14-23 @ 11:44)    vancomycin  IVPB   250 mL/Hr IV Intermittent (09-14-23 @ 21:04)    vancomycin  IVPB   187.5 mL/Hr IV Intermittent (09-14-23 @ 08:24)        OTHER MEDS:   MEDICATIONS  (STANDING):  albuterol/ipratropium for Nebulization 3 every 6 hours  argatroban Infusion 0.15 <Continuous>  dexMEDEtomidine Infusion 0.2 <Continuous>  dextrose 50% Injectable 25 once  dextrose 50% Injectable 12.5 once  dextrose 50% Injectable 25 once  dextrose Oral Gel 15 once PRN  diphenhydrAMINE Injectable 50 once PRN  filgrastim-sndz (ZARXIO) Injectable 480 daily  glucagon  Injectable 1 once  HYDROmorphone Infusion. 1 <Continuous>  insulin regular Infusion 2.8 <Continuous>  meperidine     Injectable 25 once PRN  methylPREDNISolone sodium succinate Injectable 125 daily  naloxegol 25 daily  norepinephrine Infusion 0.3 <Continuous>  pantoprazole  Injectable 40 daily  polyethylene glycol 3350 17 <User Schedule>  propofol Infusion 50 <Continuous>  senna Syrup 10 two times a day      VITALS:  Vital Signs Last 24 Hrs  T(F): 95.7 (09-18-23 @ 12:00), Max: 98.9 (09-11-23 @ 22:49)    Vital Signs Last 24 Hrs  HR: 71 (09-18-23 @ 14:00) (62 - 85)  BP: --  RR: 12 (09-18-23 @ 14:00)  SpO2: 100% (09-18-23 @ 14:00) (98% - 100%)  Wt(kg): --    EXAM:    PHYSICAL EXAM:  GENERAL: Intubated, sedated  HEAD:  Atraumatic, Normocephalic  NERVOUS SYSTEM: Sedated  CHEST/LUNG: Distant lung sounds with crackles diffusely  HEART: Regular rate and rhythm; No murmurs, rubs, or gallops  ABDOMEN: Soft, Nontender, Nondistended; Bowel sounds present  EXTREMITIES:  2+ Peripheral Pulses, No clubbing, cyanosis  SKIN: No rashes or lesions    Labs:                        11.3   0.20  )-----------( 46       ( 18 Sep 2023 12:35 )             35.5     09-18    145  |  104  |  47<H>  ----------------------------<  209<H>  4.5   |  33<H>  |  0.47<L>    Ca    8.6      18 Sep 2023 12:35  Phos  2.9     09-18  Mg     3.00     09-18    TPro  5.1<L>  /  Alb  3.1<L>  /  TBili  10.8<H>  /  DBili  x   /  AST  222<H>  /  ALT  738<H>  /  AlkPhos  127<H>  09-18      WBC Trend:  WBC Count: 0.20 (09-18-23 @ 12:35)  WBC Count: 0.19 (09-18-23 @ 08:00)  WBC Count: 0.21 (09-18-23 @ 04:00)  WBC Count: 0.20 (09-17-23 @ 22:00)      Auto Neutrophil #: 0.01 K/uL (09-18-23 @ 12:35)  Auto Neutrophil #: 0.02 K/uL (09-18-23 @ 08:00)  Auto Neutrophil #: 0.02 K/uL (09-18-23 @ 04:00)  Auto Neutrophil #: 0.03 K/uL (09-17-23 @ 22:00)  Auto Neutrophil #: 0.03 K/uL (09-17-23 @ 14:45)      Creatine Trend:  Creatinine: 0.47 (09-18)  Creatinine: 0.49 (09-18)  Creatinine: 0.52 (09-18)  Creatinine: 0.55 (09-17)      Liver Biochemical Testing Trend:  Alanine Aminotransferase (ALT/SGPT): 738 *H* (09-18)  Alanine Aminotransferase (ALT/SGPT): 760 *H* (09-18)  Alanine Aminotransferase (ALT/SGPT): 894 *H* (09-18)  Alanine Aminotransferase (ALT/SGPT): 867 *H* (09-17)  Alanine Aminotransferase (ALT/SGPT): 870 *H* (09-17)  Aspartate Aminotransferase (AST/SGOT): 222 (09-18-23 @ 12:35)  Aspartate Aminotransferase (AST/SGOT): 240 (09-18-23 @ 08:00)  Aspartate Aminotransferase (AST/SGOT): 249 (09-18-23 @ 04:00)  Aspartate Aminotransferase (AST/SGOT): 283 (09-17-23 @ 22:00)  Aspartate Aminotransferase (AST/SGOT): 304 (09-17-23 @ 14:45)  Bilirubin Total: 10.8 (09-18)  Bilirubin Total: 9.8 (09-18)  Bilirubin Direct: 8.7 (09-18)  Bilirubin Direct: 8.7 (09-18)  Bilirubin Total: 9.8 (09-18)      Trend LDH  09-18-23 @ 04:00  584<H>  09-17-23 @ 04:00  627<H>  09-16-23 @ 20:23  681<H>  09-16-23 @ 03:20  775<H>  09-15-23 @ 03:30  923<H>      Auto Eosinophil %: 0.0 % (09-18-23 @ 12:35)  Auto Eosinophil %: 0.0 % (09-18-23 @ 08:00)  Auto Eosinophil %: 0.0 % (09-18-23 @ 04:00)  Auto Eosinophil %: 0.0 % (09-17-23 @ 22:00)  Auto Eosinophil %: 0.0 % (09-17-23 @ 14:45)  Auto Eosinophil %: 0.0 % (09-17-23 @ 08:00)  Auto Eosinophil %: 0.0 % (09-17-23 @ 04:00)  Auto Eosinophil %: 0.0 % (09-16-23 @ 20:23)  Auto Eosinophil %: 0.0 % (09-16-23 @ 15:30)  Auto Eosinophil %: 0.0 % (09-16-23 @ 09:45)  Auto Eosinophil %: 0.0 % (09-16-23 @ 03:20)  Auto Eosinophil %: 0.6 % (09-15-23 @ 21:09)  Auto Eosinophil %: 0.0 % (09-15-23 @ 14:58)      Urinalysis Basic - ( 18 Sep 2023 12:35 )    Color: x / Appearance: x / SG: x / pH: x  Gluc: 209 mg/dL / Ketone: x  / Bili: x / Urobili: x   Blood: x / Protein: x / Nitrite: x   Leuk Esterase: x / RBC: x / WBC x   Sq Epi: x / Non Sq Epi: x / Bacteria: x        MICROBIOLOGY:  Vancomycin Level, Random: 5.5 (09-13 @ 20:26)    MRSA PCR Result.: NotDetec (09-13-23 @ 17:41)  MRSA PCR Result.: Negative (09-12-23 @ 04:12)      Culture - Blood (collected 16 Sep 2023 10:30)  Source: .Blood Blood-Peripheral  Preliminary Report:    No growth at 24 hours    Culture - Blood (collected 16 Sep 2023 10:25)  Source: .Blood Blood-Peripheral  Preliminary Report:    No growth at 24 hours    Culture - Sputum (collected 16 Sep 2023 09:00)  Source: ET Tube ET Tube  Final Report:    Normal Respiratory Noreen present    Culture - Blood (collected 13 Sep 2023 17:51)  Source: .Blood Blood-Peripheral  Preliminary Report:    No growth at 4 days    Culture - Blood (collected 13 Sep 2023 17:44)  Source: .Blood Blood  Preliminary Report:    No growth at 4 days    Culture - Urine (collected 13 Sep 2023 17:25)  Source: Catheterized Catheterized  Final Report:    No growth    Culture - Fungal, Bronchial (collected 13 Sep 2023 15:45)  Source: .Bronchial Bronchial Lavage  Preliminary Report:    Culture is being performed. Fungal cultures are held for 4 weeks.    Culture - Acid Fast - Bronchial w/Smear (collected 13 Sep 2023 15:45)  Source: .Bronchial Bronchial Lavage  Preliminary Report:    Culture is being performed.    Culture - Bronchial (collected 13 Sep 2023 15:45)  Source: .Bronchial Bronchial Lavage  Final Report:    No growth    Culture - Blood (collected 12 Sep 2023 03:43)  Source: .Blood Blood  Final Report:    No growth at 5 days.      HIV-1/2 Combo Result: Nonreact (09-17-23 @ 08:00)                    Rapid RVP Result: NotDetec (09-13 @ 17:55)  Legionella Antigen, Urine: Negative (09-13 @ 17:53)  Legionella Antigen, Urine: Negative (09-12 @ 04:12)      COVID-19 Nucleocapsid ALLIE AB Interp: Negative (08-29-23 @ 14:56)  COVID-19 Jeremy Domain AB Interp: Positive (08-29-23 @ 14:56)      Procalcitonin, Serum: 0.29 (09-12)    C-Reactive Protein, Serum: 105.7 (09-16)    Ferritin: 3752 (09-14)    D-Dimer Assay, Quantitative: 1058 (09-13)    Lactate Dehydrogenase, Serum: 584 (09-18)  Lactate Dehydrogenase, Serum: 627 (09-17)  Lactate Dehydrogenase, Serum: 681 (09-16)  Lactate Dehydrogenase, Serum: 775 (09-16)  Lactate Dehydrogenase, Serum: 923 (09-15)  Lactate Dehydrogenase, Serum: 966 (09-14)  Lactate Dehydrogenase, Serum: 1307 (09-13)        Blood Gas Arterial, Lactate: 1.8 (09-18 @ 12:35)  Blood Gas Arterial, Lactate: 1.8 (09-18 @ 08:00)  Blood Gas Arterial, Lactate: 2.0 (09-18 @ 04:00)  Blood Gas Arterial, Lactate: 1.9 (09-17 @ 22:00)    A1C with Estimated Average Glucose Result: 6.1 % (08-27-23 @ 05:30)      RADIOLOGY:  imaging below personally reviewed   Patient is a 62y old  Female who presents with a chief complaint of VV ECMO (18 Sep 2023 12:49)    HPI:  Zo Hager is a 62 year old female with a past medical history of afib who presented to [facility] for shortness of breath.  ID is consulted to evaluate for need of antimicrobial ppx    She had been having dyspnea for several months and had a workup in [place] with a CT scan (which was negative for PE). Per the patient's partner, Kiara, these symptoms developed suddenly beginning around April and have since worsened.  The patient developed a rash which had since resolved.  She began to feel numbness in her hands as well as a heaviness in her legs.  They saw multiple doctors, at which time was determined that she has an autoimmune condition.  She acutely developed further exertional dyspnea and hypoxemia (on home pulse ox to 82%).  She had first presented to urgent care where she had CXR which showed bilateral lower lobe infiltrates so was sent to the ER.  At [facility] she was hypoxic with an appearance of ILD on CT and admitted on 8/26 for ARF.  Pt was started on prednisone on admission with gradually improving O2 requirement.  Started steroid taper on 9/6. Started on Mycophenolate mofetil 9/8 evening but pt reported subjective side effects with weakness. Began to experience AFib with RVR on 9/11 which rate-responded to lopressor.  Respiratory function began to decline.  Patient on NRB offered HFNC/BiPAP but refused. Started on empiric vancomycin (9/12 - 9/15) with a dose of zosyn on 9/12.  Patient had an episode of SVT.  Per pulm increased solumedrol to 40mg qd. Patient was subsequently transitioned to BiPAP.  CXR showed increased pulmonary congestion for which patient was administered lasix without improvement. Intubated and started on mechanical ventilation but required very high PEEP (18) and 100% FiO2 and still had low saturations. VV ECMO team consulted and accepted to [facility].     Laboratory testing is notable for positive COVID-19 antigen in late August.    Antibiotic History:  Bactrim 9/13-9/16  Atovaquone 9/17  Meropenem 9/13 -   Azithromycin 9/13  Vancomycin 9/12 - 9/15 (per notes)  Zosyn 9/12 (per notes)        HPI obtained from chart review and speaking with patient's partner, Kiara    REVIEW OF SYSTEMS  Patient intubated and sedated      prior hospital charts reviewed [V]  primary team notes reviewed [V]  other consultant notes reviewed [V]    PAST MEDICAL & SURGICAL HISTORY:      SOCIAL HISTORY:  - Denied smoking/vaping/alcohol/recreational drug use    FAMILY HISTORY:      Allergies  No Known Allergies        ANTIMICROBIALS:  atovaquone  Suspension 1500 daily  meropenem  IVPB    meropenem  IVPB 1000 every 8 hours  vancomycin  IVPB    vancomycin  IVPB 1750 once      ANTIMICROBIALS (past 90 days):  MEDICATIONS  (STANDING):  atovaquone  Suspension   1500 milliGRAM(s) Oral (09-18-23 @ 11:57)   1500 milliGRAM(s) Oral (09-17-23 @ 11:23)    azithromycin  IVPB   255 mL/Hr IV Intermittent (09-13-23 @ 18:14)    azithromycin  IVPB   255 mL/Hr IV Intermittent (09-14-23 @ 17:52)    meropenem  IVPB   100 mL/Hr IV Intermittent (09-13-23 @ 18:14)    meropenem  IVPB   100 mL/Hr IV Intermittent (09-18-23 @ 13:35)   100 mL/Hr IV Intermittent (09-18-23 @ 05:54)   100 mL/Hr IV Intermittent (09-17-23 @ 21:52)   100 mL/Hr IV Intermittent (09-17-23 @ 14:33)   100 mL/Hr IV Intermittent (09-17-23 @ 06:20)   100 mL/Hr IV Intermittent (09-16-23 @ 22:17)   100 mL/Hr IV Intermittent (09-16-23 @ 13:19)   100 mL/Hr IV Intermittent (09-16-23 @ 05:06)   100 mL/Hr IV Intermittent (09-15-23 @ 21:34)   100 mL/Hr IV Intermittent (09-15-23 @ 11:20)   100 mL/Hr IV Intermittent (09-15-23 @ 04:15)   100 mL/Hr IV Intermittent (09-14-23 @ 20:03)   100 mL/Hr IV Intermittent (09-14-23 @ 12:48)   100 mL/Hr IV Intermittent (09-14-23 @ 02:06)    trimethoprim  40 mG/sulfamethoxazole 200 mG Suspension   160 milliGRAM(s) Oral (09-16-23 @ 12:14)   160 milliGRAM(s) Oral (09-15-23 @ 11:20)   160 milliGRAM(s) Oral (09-14-23 @ 11:44)    vancomycin  IVPB   250 mL/Hr IV Intermittent (09-14-23 @ 21:04)    vancomycin  IVPB   187.5 mL/Hr IV Intermittent (09-14-23 @ 08:24)        OTHER MEDS:   MEDICATIONS  (STANDING):  albuterol/ipratropium for Nebulization 3 every 6 hours  argatroban Infusion 0.15 <Continuous>  dexMEDEtomidine Infusion 0.2 <Continuous>  dextrose 50% Injectable 25 once  dextrose 50% Injectable 12.5 once  dextrose 50% Injectable 25 once  dextrose Oral Gel 15 once PRN  diphenhydrAMINE Injectable 50 once PRN  filgrastim-sndz (ZARXIO) Injectable 480 daily  glucagon  Injectable 1 once  HYDROmorphone Infusion. 1 <Continuous>  insulin regular Infusion 2.8 <Continuous>  meperidine     Injectable 25 once PRN  methylPREDNISolone sodium succinate Injectable 125 daily  naloxegol 25 daily  norepinephrine Infusion 0.3 <Continuous>  pantoprazole  Injectable 40 daily  polyethylene glycol 3350 17 <User Schedule>  propofol Infusion 50 <Continuous>  senna Syrup 10 two times a day      VITALS:  Vital Signs Last 24 Hrs  T(F): 95.7 (09-18-23 @ 12:00), Max: 98.9 (09-11-23 @ 22:49)    Vital Signs Last 24 Hrs  HR: 71 (09-18-23 @ 14:00) (62 - 85)  BP: --  RR: 12 (09-18-23 @ 14:00)  SpO2: 100% (09-18-23 @ 14:00) (98% - 100%)  Wt(kg): --    EXAM:    PHYSICAL EXAM:  GENERAL: Intubated, sedated  HEAD:  Atraumatic, Normocephalic  NERVOUS SYSTEM: Sedated  CHEST/LUNG: Distant lung sounds with crackles diffusely  HEART: Regular rate and rhythm; No murmurs, rubs, or gallops  ABDOMEN: Soft, Nontender, Nondistended; Bowel sounds present  EXTREMITIES:  No clubbing, cyanosis  SKIN: No rashes or lesions    Labs:                        11.3   0.20  )-----------( 46       ( 18 Sep 2023 12:35 )             35.5     09-18    145  |  104  |  47<H>  ----------------------------<  209<H>  4.5   |  33<H>  |  0.47<L>    Ca    8.6      18 Sep 2023 12:35  Phos  2.9     09-18  Mg     3.00     09-18    TPro  5.1<L>  /  Alb  3.1<L>  /  TBili  10.8<H>  /  DBili  x   /  AST  222<H>  /  ALT  738<H>  /  AlkPhos  127<H>  09-18      WBC Trend:  WBC Count: 0.20 (09-18-23 @ 12:35)  WBC Count: 0.19 (09-18-23 @ 08:00)  WBC Count: 0.21 (09-18-23 @ 04:00)  WBC Count: 0.20 (09-17-23 @ 22:00)      Auto Neutrophil #: 0.01 K/uL (09-18-23 @ 12:35)  Auto Neutrophil #: 0.02 K/uL (09-18-23 @ 08:00)  Auto Neutrophil #: 0.02 K/uL (09-18-23 @ 04:00)  Auto Neutrophil #: 0.03 K/uL (09-17-23 @ 22:00)  Auto Neutrophil #: 0.03 K/uL (09-17-23 @ 14:45)      Creatine Trend:  Creatinine: 0.47 (09-18)  Creatinine: 0.49 (09-18)  Creatinine: 0.52 (09-18)  Creatinine: 0.55 (09-17)      Liver Biochemical Testing Trend:  Alanine Aminotransferase (ALT/SGPT): 738 *H* (09-18)  Alanine Aminotransferase (ALT/SGPT): 760 *H* (09-18)  Alanine Aminotransferase (ALT/SGPT): 894 *H* (09-18)  Alanine Aminotransferase (ALT/SGPT): 867 *H* (09-17)  Alanine Aminotransferase (ALT/SGPT): 870 *H* (09-17)  Aspartate Aminotransferase (AST/SGOT): 222 (09-18-23 @ 12:35)  Aspartate Aminotransferase (AST/SGOT): 240 (09-18-23 @ 08:00)  Aspartate Aminotransferase (AST/SGOT): 249 (09-18-23 @ 04:00)  Aspartate Aminotransferase (AST/SGOT): 283 (09-17-23 @ 22:00)  Aspartate Aminotransferase (AST/SGOT): 304 (09-17-23 @ 14:45)  Bilirubin Total: 10.8 (09-18)  Bilirubin Total: 9.8 (09-18)  Bilirubin Direct: 8.7 (09-18)  Bilirubin Direct: 8.7 (09-18)  Bilirubin Total: 9.8 (09-18)      Trend LDH  09-18-23 @ 04:00  584<H>  09-17-23 @ 04:00  627<H>  09-16-23 @ 20:23  681<H>  09-16-23 @ 03:20  775<H>  09-15-23 @ 03:30  923<H>      Auto Eosinophil %: 0.0 % (09-18-23 @ 12:35)  Auto Eosinophil %: 0.0 % (09-18-23 @ 08:00)  Auto Eosinophil %: 0.0 % (09-18-23 @ 04:00)  Auto Eosinophil %: 0.0 % (09-17-23 @ 22:00)  Auto Eosinophil %: 0.0 % (09-17-23 @ 14:45)  Auto Eosinophil %: 0.0 % (09-17-23 @ 08:00)  Auto Eosinophil %: 0.0 % (09-17-23 @ 04:00)  Auto Eosinophil %: 0.0 % (09-16-23 @ 20:23)  Auto Eosinophil %: 0.0 % (09-16-23 @ 15:30)  Auto Eosinophil %: 0.0 % (09-16-23 @ 09:45)  Auto Eosinophil %: 0.0 % (09-16-23 @ 03:20)  Auto Eosinophil %: 0.6 % (09-15-23 @ 21:09)  Auto Eosinophil %: 0.0 % (09-15-23 @ 14:58)      Urinalysis Basic - ( 18 Sep 2023 12:35 )    Color: x / Appearance: x / SG: x / pH: x  Gluc: 209 mg/dL / Ketone: x  / Bili: x / Urobili: x   Blood: x / Protein: x / Nitrite: x   Leuk Esterase: x / RBC: x / WBC x   Sq Epi: x / Non Sq Epi: x / Bacteria: x        MICROBIOLOGY:  Vancomycin Level, Random: 5.5 (09-13 @ 20:26)    MRSA PCR Result.: NotDetec (09-13-23 @ 17:41)  MRSA PCR Result.: Negative (09-12-23 @ 04:12)      Culture - Blood (collected 16 Sep 2023 10:30)  Source: .Blood Blood-Peripheral  Preliminary Report:    No growth at 24 hours    Culture - Blood (collected 16 Sep 2023 10:25)  Source: .Blood Blood-Peripheral  Preliminary Report:    No growth at 24 hours    Culture - Sputum (collected 16 Sep 2023 09:00)  Source: ET Tube ET Tube  Final Report:    Normal Respiratory Noreen present    Culture - Blood (collected 13 Sep 2023 17:51)  Source: .Blood Blood-Peripheral  Preliminary Report:    No growth at 4 days    Culture - Blood (collected 13 Sep 2023 17:44)  Source: .Blood Blood  Preliminary Report:    No growth at 4 days    Culture - Urine (collected 13 Sep 2023 17:25)  Source: Catheterized Catheterized  Final Report:    No growth    Culture - Fungal, Bronchial (collected 13 Sep 2023 15:45)  Source: .Bronchial Bronchial Lavage  Preliminary Report:    Culture is being performed. Fungal cultures are held for 4 weeks.    Culture - Acid Fast - Bronchial w/Smear (collected 13 Sep 2023 15:45)  Source: .Bronchial Bronchial Lavage  Preliminary Report:    Culture is being performed.    Culture - Bronchial (collected 13 Sep 2023 15:45)  Source: .Bronchial Bronchial Lavage  Final Report:    No growth    Culture - Blood (collected 12 Sep 2023 03:43)  Source: .Blood Blood  Final Report:    No growth at 5 days.      HIV-1/2 Combo Result: Nonreact (09-17-23 @ 08:00)                    Rapid RVP Result: NotDetec (09-13 @ 17:55)  Legionella Antigen, Urine: Negative (09-13 @ 17:53)  Legionella Antigen, Urine: Negative (09-12 @ 04:12)      COVID-19 Nucleocapsid ALLIE AB Interp: Negative (08-29-23 @ 14:56)  COVID-19 Jeremy Domain AB Interp: Positive (08-29-23 @ 14:56)      Procalcitonin, Serum: 0.29 (09-12)    C-Reactive Protein, Serum: 105.7 (09-16)    Ferritin: 3752 (09-14)    D-Dimer Assay, Quantitative: 1058 (09-13)    Lactate Dehydrogenase, Serum: 584 (09-18)  Lactate Dehydrogenase, Serum: 627 (09-17)  Lactate Dehydrogenase, Serum: 681 (09-16)  Lactate Dehydrogenase, Serum: 775 (09-16)  Lactate Dehydrogenase, Serum: 923 (09-15)  Lactate Dehydrogenase, Serum: 966 (09-14)  Lactate Dehydrogenase, Serum: 1307 (09-13)        Blood Gas Arterial, Lactate: 1.8 (09-18 @ 12:35)  Blood Gas Arterial, Lactate: 1.8 (09-18 @ 08:00)  Blood Gas Arterial, Lactate: 2.0 (09-18 @ 04:00)  Blood Gas Arterial, Lactate: 1.9 (09-17 @ 22:00)    A1C with Estimated Average Glucose Result: 6.1 % (08-27-23 @ 05:30)      RADIOLOGY:  imaging below personally reviewed

## 2023-09-18 NOTE — CONSULT NOTE ADULT - SUBJECTIVE AND OBJECTIVE BOX
Chief Complaint:  Patient is a 62y old  Female who presents with a chief complaint of VV ECMO (18 Sep 2023 18:22)      HPI: 61 yo F w/ Afib transferred to [facility] from [facility] after requiring VV ECMO for Acute hypoxemic respiratory failure. She was admitted to [facility] after c/o SOB x 3mos as well as b/l hand numbness/tingling and muscle weakness. Found to be hypoxic and have interstitial lung disease appearance on CT, admitted 8/26 for AHRF, further ILD workup and management,  started on prednisone on admission with gradually improving O2 requirement and has been stable on 2-3LNC since 9/3, underwent bronchoscopy with TBBX on 8/30 which showed Non-Specific Intersitial Disease (NSIP)/OP.  Patient continued on steroids and recieved cellcept, which was discontinued 2/2 Afib-RVR.  Interval CTA chest on 9/11 also negative PE did show possible lingula pneumonia. S/p CTX,  vancomycin and zosyn 9/12, course was then c/b episode of SVT to 170s w/ rapidly progressive hypoxemic respiratory failure, placed NRB offered HFNC/BiPAP but refused, leading to worsening hypoxemic respiratory failure requiring intubation 9/13, was hypotensive to 70s/40s and started on multiple pressors. Despite best practices, patient remained hypoxemic and patient was cannulated for VV-ECMO on 9/13 and transferred to [facility].  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 S/p pulse dose steroids x3 days, now 1mg/kg daily. PLEX session (9/15, 9/18), Rituximab 9/16 and receiving Vanco and Wilmer empirically. Found to have elevated liver enzymes initially attributed to shock liver, AST peaked at 1033 on 9/13, ALT initially improved then began to rise 1069 on 9/17, T. bili has continued to rise to 9.8 (direct 8.7) on 9/18. AST and ALT remain elevated but are downtrending. RUQ revealed fatty infiltration of liver, but no cholelithiasis or biliary ductal dilatation. Hepatology consulted for elevated liver enzymes and Tbili.        Allergies:  No Known Allergies      Home Medications:    Hospital Medications:  albuterol/ipratropium for Nebulization 3 milliLiter(s) Nebulizer every 6 hours  argatroban Infusion 0.15 MICROgram(s)/kG/Min IV Continuous <Continuous>  artificial  tears Solution 1 Drop(s) Both EYES every 12 hours  atovaquone  Suspension 1500 milliGRAM(s) Oral daily  chlorhexidine 0.12% Liquid 15 milliLiter(s) Oral Mucosa every 12 hours  chlorhexidine 2% Cloths 1 Application(s) Topical <User Schedule>  dexMEDEtomidine Infusion 0.2 MICROgram(s)/kG/Hr IV Continuous <Continuous>  dextrose 5%. 1000 milliLiter(s) IV Continuous <Continuous>  dextrose 5%. 1000 milliLiter(s) IV Continuous <Continuous>  dextrose 50% Injectable 12.5 Gram(s) IV Push once  dextrose 50% Injectable 25 Gram(s) IV Push once  dextrose 50% Injectable 25 Gram(s) IV Push once  dextrose Oral Gel 15 Gram(s) Oral once PRN  diphenhydrAMINE Injectable 50 milliGRAM(s) IV Push once PRN  filgrastim-sndz (ZARXIO) Injectable 480 MICROGram(s) SubCutaneous daily  folic acid 1 milliGRAM(s) Oral daily  glucagon  Injectable 1 milliGRAM(s) IntraMuscular once  insulin regular Infusion 2.8 Unit(s)/Hr IV Continuous <Continuous>  meperidine     Injectable 25 milliGRAM(s) IV Push once PRN  meropenem  IVPB 1000 milliGRAM(s) IV Intermittent every 8 hours  meropenem  IVPB      methylPREDNISolone sodium succinate Injectable 125 milliGRAM(s) IV Push daily  multivitamin/minerals/iron Oral Solution (CENTRUM) 15 milliLiter(s) Oral daily  naloxegol 25 milliGRAM(s) Oral daily  norepinephrine Infusion 0.3 MICROgram(s)/kG/Min IV Continuous <Continuous>  pantoprazole  Injectable 40 milliGRAM(s) IV Push daily  polyethylene glycol 3350 17 Gram(s) Oral <User Schedule>  senna Syrup 10 milliLiter(s) Oral two times a day  vancomycin  IVPB          PMHX/PSHX:      Family history:   Denies any family history of GI-related disease or cancers.    Social History:   ETOH: denies  Tobacco: denies  Illicit drug use: denies    ROS: 14 point ROS negative unless otherwise stated in HPI      Vital Signs:  Vital Signs Last 24 Hrs  T(C): 35.8 (18 Sep 2023 17:00), Max: 35.8 (18 Sep 2023 09:00)  T(F): 96.5 (18 Sep 2023 17:00), Max: 96.5 (18 Sep 2023 09:00)  HR: 70 (18 Sep 2023 19:00) (62 - 85)  BP: --  BP(mean): --  RR: 12 (18 Sep 2023 19:00) (12 - 18)  SpO2: 100% (18 Sep 2023 19:00) (98% - 100%)    Parameters below as of 18 Sep 2023 19:00  Patient On (Oxygen Delivery Method): ventilator    O2 Concentration (%): 40  Daily     Daily     PHYSICAL EXAM:     GENERAL:  Appears stated age, +critically ill appearing  HEENT:  NC/AT  CHEST:  +vented breath sounds  HEART:  Regular rhythm, S1, S2, no murmur/rub/S3/S4  ABDOMEN:  Soft, non-tender, non-distended    EXTREMITIES:  no cyanosis,clubbing or edema  SKIN:  +jaundice  NEURO:  intubated; sedated      LABS:                        10.9   0.21  )-----------( 31       ( 18 Sep 2023 18:00 )             34.5     09-18    147<H>  |  103  |  48<H>  ----------------------------<  173<H>  4.1   |  35<H>  |  0.45<L>    Ca    8.8      18 Sep 2023 18:00  Phos  3.2     09-18  Mg     2.80     09-18    TPro  5.1<L>  /  Alb  3.2<L>  /  TBili  7.7<H>  /  DBili  x   /  AST  139<H>  /  ALT  363<H>  /  AlkPhos  126<H>  09-18    LIVER FUNCTIONS - ( 18 Sep 2023 18:00 )  Alb: 3.2 g/dL / Pro: 5.1 g/dL / ALK PHOS: 126 U/L / ALT: 363 U/L / AST: 139 U/L / GGT: x           PT/INR - ( 18 Sep 2023 18:00 )   PT: 16.3 sec;   INR: 1.47 ratio         PTT - ( 18 Sep 2023 18:00 )  PTT:52.2 sec  Urinalysis Basic - ( 18 Sep 2023 18:00 )    Color: x / Appearance: x / SG: x / pH: x  Gluc: 173 mg/dL / Ketone: x  / Bili: x / Urobili: x   Blood: x / Protein: x / Nitrite: x   Leuk Esterase: x / RBC: x / WBC x   Sq Epi: x / Non Sq Epi: x / Bacteria: x          Imaging:       ACC: [id number] EXAM:  US ABDOMEN RT UPR QUADRANT   ORDERED BY: FRED ALANIS     PROCEDURE DATE:  09/18/2023          INTERPRETATION:  CLINICAL INFORMATION: Rising bilirubin.    COMPARISON: None available.    TECHNIQUE: Sonography of the rightupper quadrant.    FINDINGS:  Liver: Fatty infiltration of the liver. Hepatomegaly.  Bile ducts: Normal caliber. Common bile duct measures 4 mm.  Gallbladder: Within normal limits.  Pancreas: Poorly visualized.  Right kidney: 11.0 cm. No hydronephrosis.  Ascites: None.  IVC: Visualized portions are within normal limits.    IMPRESSION:  Fatty infiltration of the liver.  No biliary ductal dilatation.

## 2023-09-18 NOTE — CHART NOTE - NSCHARTNOTEFT_GEN_A_CORE
----- Message from Lisa Maher RN sent at 10/13/2021  3:11 PM CDT -----  Regarding: Barbara Yoon consultation  Hi,    This patient called another clinic (I got a teams message) and she may self-refer to Dr. Briseida Ansari. Just wanted to give you a heads up in case she does call you guys! I would need to request outside imaging, so if she does call I'll need time to get the images from wherever she had her MRI done. He is out of town next week so that will give us a buffer if she calls right away. Referring: self  Reason: neurofibromatosis  Visit type: in person consult once imaging received    Thanks!   Malik Degroot :  Enzo Bolaños     INDICATION: ECMO     PROCEDURE:   [x] LIMITED ECHO  [x] LIMITED CHEST  [ ] LIMITED RETROPERITONEAL  [ ] LIMITED ABDOMINAL  [ ] LIMITED DVT  [ ] NEEDLE GUIDANCE VASCULAR  [ ] NEEDLE GUIDANCE THORACENTESIS  [ ] NEEDLE GUIDANCE PARACENTESIS  [ ] NEEDLE GUIDANCE PERICARDIOCENTESIS  [ ] OTHER     FINDINGS:   Lungs:  - mostly B line pattern with irregular pleura  - scattered areas with A-line predominance    Cardiac:  - RV=LV  - normal LV systolic function  - LVOT VTI 23cm -> CO 5.3 L/min  - MV E 0.63m/s, A 0.72 m/s,. E/A 0.87, E' 0.06 m/s, E/E' 11.4  - IVC 1.8cm with resp variation         INTERPRETATION:   Improvement in LV systolic function.   Mild-moderate LV diastolic dysfunction.  RV still with some dilation but improved function  Pulmonary disease improving    Images stored in qpath    Supervised by Dr. Prabhakar    Should not be considered final until signed by attending. :  Enzo Bolaños     INDICATION: ECMO     PROCEDURE:   [x] LIMITED ECHO  [x] LIMITED CHEST  [ ] LIMITED RETROPERITONEAL  [ ] LIMITED ABDOMINAL  [ ] LIMITED DVT  [ ] NEEDLE GUIDANCE VASCULAR  [ ] NEEDLE GUIDANCE THORACENTESIS  [ ] NEEDLE GUIDANCE PARACENTESIS  [ ] NEEDLE GUIDANCE PERICARDIOCENTESIS  [ ] OTHER     FINDINGS:   Lungs:  - mostly B line pattern with irregular pleura  - scattered areas with A-line predominance    Cardiac:  - RV=LV  - normal LV systolic function  - LVOT VTI 23cm -> CO 5.3 L/min  - MV E 0.63m/s, A 0.72 m/s,. E/A 0.87, E' 0.06 m/s, E/E' 11.4  - IVC 1.8cm with resp variation         INTERPRETATION:   Improvement in LV systolic function.   Mild-moderate LV diastolic dysfunction.  RV still with some dilation but improved function  Pulmonary disease improving    Images stored in qpath    Supervised by Dr. Prabhakar      Attending Addendum:  At bedside for procedure and agree with above.  ROSA Prabhakar DO, [facility]

## 2023-09-18 NOTE — PROGRESS NOTE ADULT - ASSESSMENT
61 yo F w/ Afib initially presented to urgent care for SOB where she had an XR done concerning for b/l lower infiltrates, sent to [facility] ED and  admitted to [facility] on 8/26 after being found to be hypoxemic, reported having  debilitating b/l hand numbness/tingling and some muscles weakness several months. Found to be hypoxic and have interstitial lung disease appearance on CT, admitted 8/26 for AHRF, further ILD workup and management,  started on prednisone on admission with gradually improving O2 requirement and has been stable on 2-3LNC since 9/3, underwent bronchoscopy with TBBX on 8/30 which showed Non-Specific Intersitial Disease (NSIP)/OP. Labs notable for positive ARIANA 1:1280, RNP > 8, Alejandro > 8. Patient continued on steroids and recieved cellcept, which was discontinued 2/2 Afib-RVR. Interval CTA chest on 9/11 also negative PE did show possible lingula pneumonia.  Started on empiric vancomycin and zosyn 9/12, course was then c/b episode of SVT to 170s w/ rapidly progressive hypoxemic respiratory failure, placed NRB offered HFNC/BiPAP but refused, leading to worsening hypoxemic respiratory failure requiring intubation  9/13. Despite best practices, patient remained hypoxemic and patient was cannulated for VV-ECMO on 9/13 and transferred to [facility].      NEUROLOGY  Home meds: gabapentin 100g TID  AA&Ox3 at baseline   - opened eyes, moved LE and R upper arm spontaneously but did not following commands off sedation  - currently sedated w propfol and dilaudid i/s/o severe hypoxemic respiratory failure, plan to wean dilaudid today to RASS -1/-2  - received cisatracurium IVP given for procedures yesterday  - low threshold to paralyze if dysynchronus   - [facility] 9/14 no acute findings  - Neurology following at OSH for pain and numbness of upper extremities x3 mos likely  neuropathy or myopathy r/t  underlying rheum condition, planned for EMG outpt w/Dr. Marie or Dr. Urbano  - Palliative following  - Social Work consult  - PT consult   - wean sedation  - add precedex for anxiolysis if needed    PULMONARY  Admitted to [facility] on 8/26 after p/w SOB, found to be hypoxemic, required 2-4L NC/bibap, initially responded well to IV steroids. 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 2/2 bacterial PNA vs atypical PNA vs aspiration vs AEILD   - pt reportedly had been seen by a pulmonologist and rheumatology ([place]), and was ruled out for lupus and other immunological disorders.  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  - airway clearance to assist facilitate secretion mobilization with IPV and duonebs  - f/u BAL cultures, urine strep and legionella  - c/w empiric abx   - PLEX 9/18  trialed IPV yesterday, vomited shortly after initiation, now discontinued    ECMO  VV ECMO		  Cannulation sites/dates: 9/13/23 R fem 25F. [facility] 19F   Flow: 3.7-4		P1: 176  	Delta P: 22  RPM: 3000		P2: 154		SVO2: 80  Sweep:   2.5                   L/min:              FIO2:   .70      ECMO Calculated CO:  4.0-4.9  DO2/VO2:   VO2/DO2:     - 9/14 right groin cannula pulled-back ~3-4cm to position tip of cannula just inferior to hepatic vein   - clinically perfusing. Lactate: 1.4  - ECMO sweep sighing q1hr  - Adjust circuit flow as tolerated with DO2:VO2 goal >2  - Monitor for hemolysis, chatter, evidence of recirculation       CARDIOVASCULAR  Hx of Afib w at OSH, started on Amiodarone gtt now in shock state requiring requiring pressors likely septic with component of vaspoplegia i/s/o sedation, possible contribution of cardiogenic from RV dysfunction   - No PE seen on invasive pulmonary angiography at time of ECMO cannulation or in recent imaging  - NO2 weaned off  (9/15) RV function remains unchanged  - s/p milrinone, off since (9/13)  - requiring pressors, continue to wean norepinephrine to maintain map > 65  - Roosevelt off since ( 9/14) and Vaso off since (9/15)  - converted to sinus (9/14) discontinued amio gtt iso bradycardia   - TTE 9/12 with EF 65-70% with normal LV and RV  - TTE 9/14 with  EF 18%. Severe global LV systolic dysfunction. RV enlargement with decreased RV systolic function, however RITCHIE and recent POCUS shows normal LV systolic function, improved RV systolic function RV< LV . LVOT VTI 20.9 cm. IVC 2.3 cm.        RENAL  sCr baseline 0.78  - Creatinine wnl, 0.83 today   - monitor i/o's, negative -60ml LOS/ negative -1.1L 24hrs  - s/p diuresis with lasix, last administered 9/15  - now with hemoconcentrator to assist with fluid removal, goal to keep 1-2L net negative  - indwelling catheter in place, making good UO  - monitor, Replete to K>4, Phos>3, Mg>2, iCa>1  - metabolic alkalosis, ?contraction      GASTROINTESTINAL  Transaminase elevation likely 2/2 combination of shock liver, malposition of ECMO cannula and liver dysfunction  - ALK normal 67, AST downtrending but remain elevated 525, ALT continues to rise 992, T.bili remains elevated 5 (mostly direct)  - Acute hepatitis panel negative  - RUQ US 9/15 shows fatty infiltration of liver, with no cholelithiasis or biliary ductal dilatation.  - Triglycerides elevated 836, on insulin gtt for hyperglycemia, now downtrending 406 > 271  - tolerating tube feeds via NGT   - no BM reported since admission, can give reglan  IVP x 3 doses for gastroparesis, QTc  416  - continue bowel regimen senna, miralax and movantik to avoid opioid induced constipation  - CT abdomen 9/15 w/o bowel obstruction, noted with colonic diverticulosis mostly in sigmoid, w/o evidence of diverticulitis  - continue PPI iso steroids   - nutrition following  - feeds held yesterday following vomiting     INFECTIOUS DISEASE  Neutropenia likely transient? vs drug induced vs infection vs malignancy?  - WBC 0.98, remains afebrile however temperature has been regulated via ECMO circuit, will turn off and monitor for fever  - Bactrim DS discontinued iso neutropenia, will start Mepron for PJP prophylaxis instead  - can repeat blood and sputum cultures 9/16  - heme consult given neutropenia and thrombocytopenia  - c/w empiric donald (9/13- ) for 7 day course  - s/p IV Ceftriaxone 5 days? at [facility] out of an abundance of precaution to cover BAL specimen  - s/p zosyn at OSH (9/12) for lingula PNA  - vancomycin (9/12-9/15 ) d/c'd negative mrsa PCR, and azithro (9/14-9/15) d/c'd neg Urine legionella  - 9/13 RVP negative, urine, sputum and blood cx ngtd  - f/u BAL cyto, cell count, fungitell  - f/u legionella PCR, quantiferon, mycoplasma pneumoniae, pneumocystis jiroveci pcr      ENDOCRINE  # Hyperglycemia i/s/o steroids  Admission A1c 6.1  - hyperglycemia iso steroids, glucose remained elevated despite increase of NPH and ISS  - continue insulin gtt for tighter glycemic control, and   - elevated triglycerides 835, has hx of fatty liver, currently on propofol gtt. TG downtrending since initiating insulin gtt for hyperglycemia, now 406> 271, continue to monitor daily      HEME  # ECMO anticoagulation  - continue argatroban gtt goal aPTT 50-70  - INR elevated likely iso argatroban and liver dysfxn,  -  s/p Vit. K (9/14) and FFP x1 (9/15)  - fibrinogen 237, monitor daily  - Hgb remains stable 11.9  - Transfuse for Hgb <7, Plt<10  - platelets continue to downtrend, 48 today    #Leukopenia  #Thrombocytopenia  - Heme consult placed for thrombocytopenia, noted to also be neutropenic  - unlikely HLH/MAS since many abnormalities can be explained by severe hypoxemia/hypotension during initial decompensation prior to ecmo cannulation but some features that are consistent and with rheumatic disease it is a possibility to f/u hematology regarding utility of further lab/pathologic testing    #R/O DVT  - LE duplex negative for DVT but will repeat  - f/u duplex- pending    MSK  Onset of debilitating b/l hand cramps and some muscles weakness x several months, unclear etiology, radiculopathy? vs myositis?   - MRI outpatient reportedly showed cervical spine issues, started on Prednisone shortly before admission at OSH  - seen by neurologist at [facility]   - symptoms improved with cellcept (9/8-9/11) but discontinued given Afib RVR   - f/u with Dr. Nik Marie or Dante Urbano for EMG upon discharge (osh?)      RHEUM  - started on Solumedrol 60mg q6h from 9/1 to 9/6 then weaned over time to then Medrol 32 mg 9/9 to 9/12 at [facility]  - s/p Cellcept 9/8 to 9/11 but stopped due to Afib- RVR/tachycardia   - CT findings, Improved/decreased extent of bilateral ground glass opacities and reticular markings compared to the previous study. Findings are nonspecific but differential etiologies include interstitial pneumonia, drug toxicity, nonspecific connective tissue disorders.  - OSH labs show strongly positive ARIANA, RNP, and anti smith antibodies with low C4 and normal C3. 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 First session  (9/15), next session planned for Monday (will need to coordinate time with perfusion and blood bank)    SKIN  Reportedly had single episode of painful rash on her shins, ears and neck and chest which resolved with a steroid cream from a dermatologist prior to admission  - no evidence of rash currently      PROPHYLAXIS  # DVT: argatroban  # GI: TF      LINES  -Ecmo Cannulas  -LIJ TLC- 9/13 (placed at OSH)  -Left Axill [place] - 9/13 (placed at OSH)  -RA 16g PIV x2- 9/15      GOC  -Palliative Following    DISPO: ICU  CODE STATUS: Full     61 yo F w/ Afib initially presented to urgent care for SOB where she had an XR done concerning for b/l lower infiltrates, sent to [facility] ED and  admitted to [facility] on 8/26 after being found to be hypoxemic, reported having  debilitating b/l hand numbness/tingling and some muscles weakness several months. Found to be hypoxic and have interstitial lung disease appearance on CT, admitted 8/26 for AHRF, further ILD workup and management,  started on prednisone on admission with gradually improving O2 requirement and has been stable on 2-3LNC since 9/3, underwent bronchoscopy with TBBX on 8/30 which showed Non-Specific Intersitial Disease (NSIP)/OP. Labs notable for positive ARIANA 1:1280, RNP > 8, Alejandro > 8. Patient continued on steroids and recieved cellcept, which was discontinued 2/2 Afib-RVR. Interval CTA chest on 9/11 also negative PE did show possible lingula pneumonia.  Started on empiric vancomycin and zosyn 9/12, course was then c/b episode of SVT to 170s w/ rapidly progressive hypoxemic respiratory failure, placed NRB offered HFNC/BiPAP but refused, leading to worsening hypoxemic respiratory failure requiring intubation  9/13. Despite best practices, patient remained hypoxemic and patient was cannulated for VV-ECMO on 9/13 and transferred to [facility].      NEUROLOGY  Home meds: gabapentin 100g TID  AA&Ox3 at baseline   - opened eyes, moved LE and R upper arm spontaneously but did not following commands off sedation  - currently sedated w propfol and dilaudid i/s/o severe hypoxemic respiratory failure, plan to wean sedation to RASS -1/-2  - add precedex for anxiolysis if needed  - received cisatracurium IVP given for procedures. last given 9/14  - low threshold to paralyze if dysynchronus   - CT 9/14 no acute findings  - Palliative following  - Social Work consult  - PT consult         PULMONARY  Admitted to [facility] on 8/26 after p/w SOB, found to be hypoxemic, required 2-4L NC/bibap, initially responded well to IV steroids. 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 2/2 bacterial PNA vs atypical PNA vs aspiration vs AEILD   - pt reportedly had been seen by a pulmonologist and rheumatology ([place]), and was ruled out for lupus and other immunological disorders.  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  - continue with duonebs, trialed IPV , vomited shortly after initiation, now discontinued  - c/w empiric abx   - PLEX 9/18, plan for second session today      ECMO  VV ECMO		  Cannulation sites/dates: 9/13/23 R fem 25F. RIJ 19F   Flow: 3.78		P1: 169  	Delta P: 25  RPM: 3000		P2: 144		SVO2: 78  Sweep:   3.5                  L/min:              FIO2:   100%      ECMO Calculated CO:  4.0-4.9  DO2/VO2:   VO2/DO2:     - 9/14 right groin cannula pulled-back ~3-4cm to position tip of cannula just inferior to hepatic vein   - clinically perfusing. Lactate: 1.8  - ECMO sweep sighing q1hr  - Adjust circuit flow as tolerated with DO2:VO2 goal >2  - Monitor for hemolysis, chatter, evidence of recirculation       CARDIOVASCULAR  Hx of Afib w at OSH, started on Amiodarone gtt now in shock state requiring requiring pressors likely septic with component of vaspoplegia i/s/o sedation, possible contribution of cardiogenic from RV dysfunction   - No PE seen on invasive pulmonary angiography at time of ECMO cannulation or in recent imaging  - NO2 weaned off  (9/15) RV function remains unchanged  - s/p milrinone, off since (9/13)  - requiring pressors, continue to wean norepinephrine to maintain map > 65  - Roosevelt off since ( 9/14) and Vaso off since (9/15)  - converted to sinus (9/14) discontinued amio gtt iso bradycardia   - RITCHIE 9/14 : No MEREDITH thrombus noted, negative for PFO. LVOT diameter of 2 cm.  - TTE 9/12 with EF 65-70% with normal LV and RV  - TTE 9/14 with  EF 18%. Severe global LV systolic dysfunction. RV enlargement with decreased RV systolic function, however RITCHIE and recent POCUS shows normal LV systolic function, improved RV systolic function         RENAL  sCr baseline 0.78  - Creatinine wnl, 0.49 today   - monitor i/o's, negative -2.8ml LOS/ negative -160mL 24hrs  - s/p diuresis with lasix, last administered 9/15  - now with hemoconcentrator to assist with fluid removal, goal to keep net even  - indwelling catheter in place, making good UO  - monitor, Replete to K>4, Phos>3, Mg>2, iCa>1  - metabolic alkalosis, ?contraction  - continue d5w for hypernatremia 150, monitor bmp q6      GASTROINTESTINAL  Transaminase elevation likely 2/2 combination of shock liver, malposition of ECMO cannula and liver dysfunction  - ALK normal 106, AST/ALT downtrending but remain elevated 240/760, T.bili continues to rise 9.8  - Acute hepatitis panel negative  - RUQ US 9/15 shows fatty infiltration of liver, negative for hepatobiliary pathology  - Triglycerides elevated 836, on insulin gtt for hyperglycemia, downtrending 406 > 271 >221 >173  - Episode of vomiting 9/16, tube feeds held and given reglan x48 hours, restarted TF 9/17, tolerating  - BM noted 9/17  - continue bowel regimen senna, miralax and movantik to avoid opioid induced constipation  - CT abdomen 9/15 w/o bowel obstruction, noted with colonic diverticulosis mostly in sigmoid, w/o evidence of diverticulitis  - continue PPI iso steroids   - nutrition following    INFECTIOUS DISEASE  Leukopenia likely transient? vs drug induced vs infection vs malignancy?  - WBC 0.19, remains afebrile however temperature has been regulated via ECMO circuit, will turn off and monitor for fever  - Bactrim DS discontinued iso leukopenia, continue Mepron for PJP prophylaxis instead  - heme following- thrombocytopenia noted as well as leukopenia, likely drug induced?  - c/w empiric donald (9/13- ) for 7 day course  - s/p IV Ceftriaxone 5 days? at [facility] out of an abundance of precaution to cover BAL specimen  - s/p zosyn at OSH (9/12) for lingula PNA  - vancomycin (9/12-9/15 ) d/c'd negative mrsa PCR, and azithro (9/14-9/15) d/c'd neg Urine legionella  - 9/13 RVP negative, urine, sputum and blood cx ngtd  - f/u BAL cyto, cell count, fungitell  - f/u legionella PCR, quantiferon, mycoplasma pneumoniae, pneumocystis jiroveci pcr      ENDOCRINE  # Hyperglycemia i/s/o steroids  Admission A1c 6.1  - hyperglycemia iso steroids, glucose remained elevated despite increase of NPH and ISS  - continue insulin gtt for tighter glycemic control, and   - elevated triglycerides 835, has hx of fatty liver, currently on propofol gtt. TG downtrending since initiating insulin gtt for hyperglycemia, now 173, cotinue to monitor daily      HEME  # ECMO anticoagulation  - continue argatroban gtt goal aPTT 50-70  - INR elevated likely iso argatroban and liver dysfxn  - platelets continue to downtrend, refractory after 4 units  transfusion, 51 today  - s/p Vit. K (9/14) and FFP x1 (9/15), platelets x 4units (9/17, 9/18)  - fibrinogen 250, monitor daily  - Hgb remains stable 11.3  - Transfuse for Hgb <7, Plt<10      #Leukopenia  #Thrombocytopenia  - Heme consult placed for thrombocytopenia, noted to also be neutropenic  - unlikely HLH/MAS since many abnormalities can be explained by severe hypoxemia/hypotension during initial decompensation prior to ecmo cannulation but some features that are consistent and with rheumatic disease it is a possibility to f/u hematology regarding utility of further lab/pathologic testing  - peripheral blood smear reviewed: large platelets noted, no platelet clumps, no blast cells noted, neutrophils noted w/ normal number of lobes, nucleated RBC noted  - acute hepatitis panel negative  - give filgrastim 480 daily until ANC >1500 in the setting of possible infxn    #R/O DVT  - LE duplex negative for DVT but will repeat  - f/u duplex- pending    MSK  Onset of debilitating b/l hand cramps and some muscles weakness x several months, unclear etiology, radiculopathy? vs myositis?   - MRI outpatient reportedly showed cervical spine issues, started on Prednisone shortly before admission at OSH  - seen by neurologist at [facility]   - symptoms improved with cellcept (9/8-9/11) but discontinued given Afib RVR   - f/u with Dr. Nik Marie or Dante Urbano for EMG upon discharge (osh?)      RHEUM  - started on Solumedrol 60mg q6h from 9/1 to 9/6 then weaned over time to then Medrol 32 mg 9/9 to 9/12 at [facility]  - s/p Cellcept 9/8 to 9/11 but stopped due to Afib- RVR/tachycardia   - CT findings, Improved/decreased extent of bilateral ground glass opacities and reticular markings compared to the previous study. Findings are nonspecific but differential etiologies include interstitial pneumonia, drug toxicity, nonspecific connective tissue disorders.  - OSH labs show strongly positive ARIANA, RNP, and anti smith antibodies with low C4 and normal C3. 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 First session  (9/15), next session planned for today 9/18 (will need to coordinate time with perfusion and blood bank)    SKIN  Reportedly had single episode of painful rash on her shins, ears and neck and chest which resolved with a steroid cream from a dermatologist prior to admission  - no evidence of rash currently      PROPHYLAXIS  # DVT: argatroban  # GI: TF      LINES  -Ecmo Cannulas  -LIJ TLC- 9/13 (placed at OSH)  -Left Axill [place] - 9/13 (placed at OSH)  -RA 16g PIV x2- 9/15      GOC  -Palliative Following    DISPO: ICU  CODE STATUS: Full     63 yo F w/ Afib initially presented to urgent care for SOB where she had an XR done concerning for b/l lower infiltrates, sent to [facility] ED and  admitted to [facility] on 8/26 after being found to be hypoxemic, reported having  debilitating b/l hand numbness/tingling and some muscles weakness several months. Found to be hypoxic and have interstitial lung disease appearance on CT, admitted 8/26 for AHRF, further ILD workup and management,  started on prednisone on admission with gradually improving O2 requirement and has been stable on 2-3LNC since 9/3, underwent bronchoscopy with TBBX on 8/30 which showed Non-Specific Intersitial Disease (NSIP)/OP. Labs notable for positive ARIANA 1:1280, RNP > 8, Alejandro > 8. Patient continued on steroids and recieved cellcept, which was discontinued 2/2 Afib-RVR. Interval CTA chest on 9/11 also negative PE did show possible lingula pneumonia.  Started on empiric vancomycin and zosyn 9/12, course was then c/b episode of SVT to 170s w/ rapidly progressive hypoxemic respiratory failure, placed NRB offered HFNC/BiPAP but refused, leading to worsening hypoxemic respiratory failure requiring intubation  9/13. Despite best practices, patient remained hypoxemic and patient was cannulated for VV-ECMO on 9/13 and transferred to [facility].      NEUROLOGY  Home meds: gabapentin 100g TID  AA&Ox3 at baseline   - opened eyes, moved LE and R upper arm spontaneously but did not following commands off sedation  - currently sedated w propfol and dilaudid i/s/o severe hypoxemic respiratory failure, plan to wean sedation to RASS -1/-2  - add precedex for anxiolysis if needed  - received cisatracurium IVP given for procedures. last given 9/14  - low threshold to paralyze if dysynchronus   - CT 9/14 no acute findings  - Palliative following  - Social Work consult  - PT consult         PULMONARY  Admitted to [facility] on 8/26 after p/w SOB, found to be hypoxemic, required 2-4L NC/bibap, initially responded well to IV steroids. 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 2/2 bacterial PNA vs atypical PNA vs aspiration vs AEILD   - pt reportedly had been seen by a pulmonologist and rheumatology ([place]), and was ruled out for lupus and other immunological disorders.  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  - continue with duonebs, trialed IPV , vomited shortly after initiation, now discontinued  - c/w empiric abx   - PLEX 9/18, plan for second session today      ECMO  VV ECMO		  Cannulation sites/dates: 9/13/23 R fem 25F. RIJ 19F   Flow: 3.78		P1: 169  	Delta P: 25  RPM: 3000		P2: 144		SVO2: 78  Sweep:   3.5                  L/min:              FIO2:   100%      ECMO Calculated CO:  4.0-4.9  DO2/VO2:   VO2/DO2:     - 9/14 right groin cannula pulled-back ~3-4cm to position tip of cannula just inferior to hepatic vein   - clinically perfusing. Lactate: 1.8  - ECMO sweep sighing q1hr  - Adjust circuit flow as tolerated with DO2:VO2 goal >2  - Monitor for hemolysis, chatter, evidence of recirculation       CARDIOVASCULAR  Hx of Afib w at OSH, started on Amiodarone gtt now in shock state requiring requiring pressors likely septic with component of vaspoplegia i/s/o sedation, possible contribution of cardiogenic from RV dysfunction   - No PE seen on invasive pulmonary angiography at time of ECMO cannulation or in recent imaging  - NO2 weaned off  (9/15) RV function remains unchanged  - s/p milrinone, off since (9/13)  - no longer requiring pressors to maintain map >65  - Roosevelt off since ( 9/14) and Vaso off since (9/15) Levo off since (9/18)  - converted to sinus (9/14) discontinued amio gtt iso bradycardia   - RITCHIE 9/14 : No MEREDITH thrombus noted, negative for PFO. LVOT diameter of 2 cm.  - TTE 9/12 with EF 65-70% with normal LV and RV  - TTE 9/14 with  EF 18%. Severe global LV systolic dysfunction. RV enlargement with decreased RV systolic function, however RITCHIE and recent POCUS shows normal LV systolic function, improved RV systolic function         RENAL  sCr baseline 0.78  - Creatinine wnl, 0.49 today   - monitor i/o's, negative -2.8ml LOS/ negative -160mL 24hrs  - s/p diuresis with lasix, last administered 9/15  - now with hemoconcentrator to assist with fluid removal, goal to keep net even  - indwelling catheter in place, making good UO  - monitor, Replete to K>4, Phos>3, Mg>2, iCa>1  - metabolic alkalosis, ?contraction  - hypernatremia 150 d5w started overnight, Na >148>147> 146, can switch to free h20 250ml q8      GASTROINTESTINAL  Transaminase elevation likely 2/2 combination of shock liver, malposition of ECMO cannula and liver dysfunction  - ALK normal 106, AST/ALT downtrending but remain elevated 240/760, T.bili continues to rise 9.8  - Acute hepatitis panel negative  - RUQ US 9/15 shows fatty infiltration of liver, negative for hepatobiliary pathology, will repeat RUQ ultrasound  - GI consult placed  - Triglycerides elevated 836, on insulin gtt for hyperglycemia, downtrending 406 > 271 >221 >173  - Episode of vomiting 9/16, tube feeds held and given reglan x48 hours, restarted TF 9/17, tolerating  - BM noted 9/17  - continue bowel regimen senna, miralax and movantik to avoid opioid induced constipation,  if no BM today, can give Relistor   - CT abdomen 9/15 w/o bowel obstruction, noted with colonic diverticulosis mostly in sigmoid, w/o evidence of diverticulitis  - continue PPI iso steroids   - nutrition following    INFECTIOUS DISEASE  Leukopenia likely transient? vs drug induced vs infection vs malignancy?  - WBC 0.19, remains afebrile however temperature has been regulated via ECMO circuit, will turn off and monitor for fever  - Bactrim DS discontinued iso leukopenia, continue Mepron for PJP prophylaxis instead  - heme following- thrombocytopenia noted as well as leukopenia, likely drug induced? filgrastim started 9/17  - s/p IV Ceftriaxone 5 days? at [facility] out of an abundance of precaution to cover BAL specimen  - s/p zosyn at OSH (9/12) for lingula PNA  - vancomycin (9/12-9/15 ) d/c'd negative mrsa PCR, and azithro (9/14-9/15) d/c'd neg Urine legionella  - can restart Vancomycin ppx iso leukopenia and c/w empiric donald (9/13- ) for 7 day course?   - 9/13 RVP negative, urine, sputum and blood cx ngtd  - f/u BAL cyto, cell count, fungitell  - f/u legionella PCR, quantiferon, mycoplasma pneumoniae, pneumocystis jiroveci pcr      ENDOCRINE  # Hyperglycemia i/s/o steroids  Admission A1c 6.1  - hyperglycemia iso steroids, glucose remained elevated despite increase of NPH and ISS  - continue insulin gtt for tighter glycemic control, and   - elevated triglycerides 835, has hx of fatty liver, currently on propofol gtt. TG downtrending since initiating insulin gtt for hyperglycemia, now 173, cotinue to monitor daily      HEME  # ECMO anticoagulation  - continue argatroban gtt goal aPTT 50-70  - INR elevated likely iso argatroban and liver dysfxn  - platelets continue to downtrend, refractory after 4 units  transfusion, 51 today  - s/p Vit. K (9/14) and FFP x1 (9/15), platelets x 4units (9/17, 9/18)  - fibrinogen 250, monitor daily  - Hgb remains stable 11.3  - Transfuse for Hgb <7, Plt<10      #Leukopenia  #Thrombocytopenia  - Heme consult placed for thrombocytopenia, noted to also be neutropenic  - unlikely HLH/MAS since many abnormalities can be explained by severe hypoxemia/hypotension during initial decompensation prior to ecmo cannulation but some features that are consistent and with rheumatic disease it is a possibility to f/u hematology regarding utility of further lab/pathologic testing  - peripheral blood smear reviewed: large platelets noted, no platelet clumps, no blast cells noted, neutrophils noted w/ normal number of lobes, nucleated RBC noted  - acute hepatitis panel negative  - give filgrastim 480 daily until ANC >1500 in the setting of possible infxn  - GI consult     #R/O DVT  - LE duplex negative for DVT but will repeat  - f/u duplex- pending    MSK  Onset of debilitating b/l hand cramps and some muscles weakness x several months, unclear etiology, radiculopathy? vs myositis?   - MRI outpatient reportedly showed cervical spine issues, started on Prednisone shortly before admission at OSH  - seen by neurologist at [facility]   - symptoms improved with cellcept (9/8-9/11) but discontinued given Afib RVR   - f/u with Dr. Nik Marie or Dante Urbano for EMG upon discharge (osh?)      RHEUM  - started on Solumedrol 60mg q6h from 9/1 to 9/6 then weaned over time to then Medrol 32 mg 9/9 to 9/12 at [facility]  - s/p Cellcept 9/8 to 9/11 but stopped due to Afib- RVR/tachycardia   - CT findings, Improved/decreased extent of bilateral ground glass opacities and reticular markings compared to the previous study. Findings are nonspecific but differential etiologies include interstitial pneumonia, drug toxicity, nonspecific connective tissue disorders.  - OSH labs show strongly positive ARIANA, RNP, and anti smith antibodies with low C4 and normal C3. 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 First session  (9/15), next session planned for today 9/18 (will need to coordinate time with perfusion and blood bank)    SKIN  Reportedly had single episode of painful rash on her shins, ears and neck and chest which resolved with a steroid cream from a dermatologist prior to admission  - no evidence of rash currently      PROPHYLAXIS  # DVT: argatroban  # GI: TF      LINES  -Ecmo Cannulas  -LIJ TLC- 9/13 (placed at OSH)  -Left Axill Traci - 9/13 (placed at OSH)  -RA 16g PIV x2- 9/15      GOC  -Palliative Following    DISPO: ICU  CODE STATUS: Full     63 yo F w/ Afib initially presented to urgent care for SOB where she had an XR done concerning for b/l lower infiltrates, sent to [facility] ED and  admitted to [facility] on 8/26 after being found to be hypoxemic, reported having  debilitating b/l hand numbness/tingling and some muscles weakness several months. Found to be hypoxic and have interstitial lung disease appearance on CT, admitted 8/26 for AHRF, further ILD workup and management,  started on prednisone on admission with gradually improving O2 requirement and has been stable on 2-3LNC since 9/3, underwent bronchoscopy with TBBX on 8/30 which showed Non-Specific Intersitial Disease (NSIP)/OP. Labs notable for positive ARIANA 1:1280, RNP > 8, Alejandro > 8. Patient continued on steroids and recieved cellcept, which was discontinued 2/2 Afib-RVR. Interval CTA chest on 9/11 also negative PE did show possible lingula pneumonia.  Started on empiric vancomycin and zosyn 9/12, course was then c/b episode of SVT to 170s w/ rapidly progressive hypoxemic respiratory failure, placed NRB offered HFNC/BiPAP but refused, leading to worsening hypoxemic respiratory failure requiring intubation  9/13. Despite best practices, patient remained hypoxemic and patient was cannulated for VV-ECMO on 9/13 and transferred to [facility].      NEUROLOGY  Home meds: gabapentin 100g TID  AA&Ox3 at baseline   - opened eyes, moved LE and R upper arm spontaneously but did not following commands off sedation  - currently sedated w propfol and dilaudid i/s/o severe hypoxemic respiratory failure, plan to wean sedation to RASS -1/-2  - add precedex for anxiolysis if needed  - received cisatracurium IVP given for procedures. last given 9/14  - low threshold to paralyze if dysynchronus   - CT 9/14 no acute findings  - Palliative following  - Social Work consult  - PT consult         PULMONARY  Admitted to [facility] on 8/26 after p/w SOB, found to be hypoxemic, required 2-4L NC/bibap, initially responded well to IV steroids. 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  - rheum consulted for DAH  - continue with duonebs,  trialed IPV but vomited shortly after initiation, now discontinued,  - c/w empiric abx         ECMO  VV ECMO		  Cannulation sites/dates: 9/13/23 R fem 25F. RIJ 19F   Flow: 3.78		P1: 169  	Delta P: 25  RPM: 3000		P2: 144		SVO2: 78  Sweep:   3.5                  L/min:              FIO2:   100%      ECMO Calculated CO:  4.0-4.9  DO2/VO2:   VO2/DO2:     - 9/14 right groin cannula pulled-back ~3-4cm to position tip of cannula just inferior to hepatic vein   - clinically perfusing. Lactate: 1.8  - ECMO sweep sighing q1hr  - Adjust circuit flow as tolerated with DO2:VO2 goal >2  - Monitor for hemolysis, chatter, evidence of recirculation       CARDIOVASCULAR  Hx of Afib w at OSH, started on Amiodarone gtt now in shock state requiring requiring pressors likely septic with component of vaspoplegia i/s/o sedation, possible contribution of cardiogenic from RV dysfunction   - No PE seen on invasive pulmonary angiography at time of ECMO cannulation or in recent imaging  - NO2 weaned off  (9/15) RV function remains unchanged  - s/p milrinone, off since (9/13)  - no longer requiring pressors to maintain map >65  - Roosevelt off since ( 9/14) and Vaso off since (9/15) Levo off since (9/18)  - converted to sinus (9/14) discontinued amio gtt iso bradycardia   - RITCHIE 9/14 : No MEREDITH thrombus noted, negative for PFO. LVOT diameter of 2 cm  - TTE 9/12 with EF 65-70% with normal LV and RV  - TTE 9/14 with  EF 18%. Severe global LV systolic dysfunction. RV enlargement with decreased RV systolic function, however RITCHIE and recent POCUS shows normal LV systolic function, improved RV systolic function         RENAL  sCr baseline 0.78  - Creatinine wnl, 0.49 today   - monitor i/o's, negative -2.8ml LOS/ negative -160mL 24hrs  - s/p diuresis with lasix, last administered 9/15  - now with hemoconcentrator to assist with fluid removal, goal to keep net even  - indwelling catheter in place, can discontinue and monitor TOV  - monitor, Replete to K>4, Phos>3, Mg>2, iCa>1  - metabolic alkalosis, ?contraction  - hypernatremia 150,  Na improving  >148>147> 146, can d/c d5W, and give free h20 250ml q8 instead, monitor bmp q12      GASTROINTESTINAL  Transaminase elevation likely 2/2 combination of shock liver, malposition of ECMO cannula and liver dysfunction  - ALK normal 106, AST/ALT downtrending but remain elevated 240/760, T.bili continues to rise 9.8  - Acute hepatitis panel negative  - RUQ US 9/15 shows fatty infiltration of liver, negative for hepatobiliary pathology, will repeat RUQ ultrasound  - GI consult placed  - Triglycerides elevated 836, on insulin gtt for hyperglycemia, downtrending 406 > 271 >221 >173  - Episode of vomiting 9/16, tube feeds held and given reglan x48 hours, restarted TF 9/17, tolerating  - BM noted 9/17  - continue bowel regimen senna, miralax and movantik to avoid opioid induced constipation,  if no BM today, can give Relistor   - CT abdomen 9/15 w/o bowel obstruction, noted with colonic diverticulosis mostly in sigmoid, w/o evidence of diverticulitis  - continue PPI iso steroids   - nutrition following    INFECTIOUS DISEASE  Leukopenia likely transient? vs drug induced vs infection vs malignancy?  - WBC 0.19, remains afebrile however temperature has been regulated via ECMO circuit, will turn off and monitor for fever  - Bactrim DS discontinued iso leukopenia, continue Mepron for PJP prophylaxis instead  - heme following- thrombocytopenia noted as well as leukopenia, likely drug induced? filgrastim started 9/17  - s/p IV Ceftriaxone 5 days? at [facility] out of an abundance of precaution to cover BAL specimen  - s/p zosyn at OSH (9/12) for lingula PNA  - vancomycin (9/12-9/15 ) d/c'd negative mrsa PCR, and azithro (9/14-9/15) d/c'd neg Urine legionella  - can restart Vancomycin ppx iso leukopenia and c/w empiric donald (9/13- ) for 7 day course?   - 9/13 RVP negative, urine, sputum and blood cx ngtd  - f/u BAL cyto, cell count, fungitell  - f/u legionella PCR, quantiferon, mycoplasma pneumoniae, pneumocystis jiroveci pcr  - ID consult for leukopenia      ENDOCRINE  # Hyperglycemia i/s/o steroids  Admission A1c 6.1  - hyperglycemia iso steroids, glucose remained elevated despite increase of NPH and ISS  - continue insulin gtt for tighter glycemic control, and   - elevated triglycerides 835, has hx of fatty liver, currently on propofol gtt. TG downtrending since initiating insulin gtt for hyperglycemia, now 173, continue to monitor daily      HEME  # ECMO anticoagulation  - continue argatroban gtt goal aPTT 50-70  - INR elevated likely iso argatroban and liver dysfxn  - platelets continue to downtrend, refractory after transfusion, 47  -> 51 -> 46  - s/p Vit. K (9/14) and FFP x1 (9/15), platelets x 4units (9/17, 9/18)  - fibrinogen 250, monitor daily  - Hgb remains stable 11.3  - Transfuse for Hgb <7, Plt<10      #Leukopenia  #Thrombocytopenia  - Heme consult placed for thrombocytopenia, noted to also be neutropenic  - unlikely HLH/MAS since many abnormalities can be explained by severe hypoxemia/hypotension during initial decompensation prior to ecmo cannulation but some features that are consistent and with rheumatic disease it is a possibility to f/u hematology regarding utility of further lab/pathologic testing  - peripheral blood smear reviewed: large platelets noted, no platelet clumps, no blast cells noted, neutrophils noted w/ normal number of lobes, nucleated RBC noted  - acute hepatitis panel negative  - give filgrastim 480 daily until ANC >1500 in the setting of possible infxn  - GI consult     #R/O DVT  - LE duplex negative for DVT but will repeat  - f/u duplex- pending    MSK  Onset of debilitating b/l hand cramps and some muscles weakness x several months, unclear etiology, radiculopathy? vs myositis?   - MRI outpatient reportedly showed cervical spine issues, started on Prednisone shortly before admission at OSH  - seen by neurologist at [facility]   - symptoms improved with cellcept (9/8-9/11) but discontinued given Afib RVR   - f/u with Dr. Nik Marie or Dante Urbano for EMG upon discharge (osh?)      RHEUM  - started on Solumedrol 60mg q6h from 9/1 to 9/6 then weaned over time to then Medrol 32 mg 9/9 to 9/12 at [facility]  - s/p Cellcept 9/8 to 9/11 but stopped due to Afib- RVR/tachycardia   - CT findings, Improved/decreased extent of bilateral ground glass opacities and reticular markings compared to the previous study. Findings are nonspecific but differential etiologies include interstitial pneumonia, drug toxicity, nonspecific connective tissue disorders.  - OSH labs show strongly positive ARIANA, RNP, and anti smith antibodies with low C4 and normal C3. 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 First session  (9/15), next session planned for today 9/18 (will need to coordinate time with perfusion and blood bank)    SKIN  Reportedly had single episode of painful rash on her shins, ears and neck and chest which resolved with a steroid cream from a dermatologist prior to admission  - no evidence of rash currently      PROPHYLAXIS  # DVT: argatroban  # GI: TF      LINES  -Ecmo Cannulas  -LIJ TLC- 9/13 (placed at OSH)  -Left Axill Traci - 9/13 (placed at OSH)  -RA 16g PIV x2- 9/15      GOC  -Palliative Following    DISPO: ICU  CODE STATUS: Full     61 yo F w/ Afib initially presented to urgent care for SOB where she had an XR done concerning for b/l lower infiltrates, sent to [facility] ED and  admitted to [facility] on 8/26 after being found to be hypoxemic, reported having  debilitating b/l hand numbness/tingling and some muscles weakness several months. Found to be hypoxic and have interstitial lung disease appearance on CT, admitted 8/26 for AHRF, further ILD workup and management,  started on prednisone on admission with gradually improving O2 requirement and has been stable on 2-3LNC since 9/3, underwent bronchoscopy with TBBX on 8/30 which showed Non-Specific Intersitial Disease (NSIP)/OP. Labs notable for positive ARIANA 1:1280, RNP > 8, Alejandro > 8. Patient continued on steroids and recieved cellcept, which was discontinued 2/2 Afib-RVR. Interval CTA chest on 9/11 also negative PE did show possible lingula pneumonia.  Started on empiric vancomycin and zosyn 9/12, course was then c/b episode of SVT to 170s w/ rapidly progressive hypoxemic respiratory failure, placed NRB offered HFNC/BiPAP but refused, leading to worsening hypoxemic respiratory failure requiring intubation  9/13. Despite best practices, patient remained hypoxemic and patient was cannulated for VV-ECMO on 9/13 and transferred to [facility].      NEUROLOGY  Home meds: gabapentin 100g TID  AA&Ox3 at baseline   - opened eyes, moved LE and R upper arm spontaneously but did not following commands off sedation  - currently sedated w propfol and dilaudid i/s/o severe hypoxemic respiratory failure, plan to wean sedation to RASS -1/-2  - add precedex for anxiolysis if needed  - received cisatracurium IVP given for procedures. last given 9/14  - low threshold to paralyze if dysynchronus   - CT 9/14 no acute findings  - Palliative following  - Social Work consult  - PT consult         PULMONARY  Admitted to [facility] on 8/26 after p/w SOB, found to be hypoxemic, required 2-4L NC/bibap, initially responded well to IV steroids. 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  - rheum consulted for DAH  - continue with duonebs,  trialed IPV but vomited shortly after initiation, now discontinued,  - c/w empiric abx         ECMO  VV ECMO		  Cannulation sites/dates: 9/13/23 R fem 25F. RIJ 19F   Flow: 3.78		P1: 169  	Delta P: 25  RPM: 3000		P2: 144		SVO2: 78  Sweep:   3.5                  L/min:              FIO2:   100%      ECMO Calculated CO:  4.0-4.9  DO2/VO2:   VO2/DO2:     - 9/14 right groin cannula pulled-back ~3-4cm to position tip of cannula just inferior to hepatic vein   - clinically perfusing. Lactate: 1.8  - ECMO sweep sighing q1hr  - Adjust circuit flow as tolerated with DO2:VO2 goal >2  - Monitor for hemolysis, chatter, evidence of recirculation       CARDIOVASCULAR  Hx of Afib w at OSH, started on Amiodarone gtt now in shock state requiring requiring pressors likely septic with component of vaspoplegia i/s/o sedation, possible contribution of cardiogenic from RV dysfunction   - No PE seen on invasive pulmonary angiography at time of ECMO cannulation or in recent imaging  - NO2 weaned off  (9/15) RV function remains unchanged  - s/p milrinone, off since (9/13)  - no longer requiring pressors to maintain map >65  - Roosevelt off since ( 9/14) and Vaso off since (9/15) Levo off since (9/18)  - converted to sinus (9/14) discontinued amio gtt iso bradycardia   - RITCHIE 9/14 : No MEREDITH thrombus noted, negative for PFO. LVOT diameter of 2 cm  - TTE 9/12 with EF 65-70% with normal LV and RV  - TTE 9/14 with  EF 18%. Severe global LV systolic dysfunction. RV enlargement with decreased RV systolic function, however RITCHIE and recent POCUS shows normal LV systolic function, improved RV systolic function         RENAL  sCr baseline 0.78  - Creatinine wnl, 0.49 today   - monitor i/o's, negative -2.8ml LOS/ negative -160mL 24hrs  - s/p diuresis with lasix, last administered 9/15  - now with hemoconcentrator to assist with fluid removal   - metabolic alkalosis, ?contraction, goal to keep net even given hypernatremia and elevated bicarb  - indwelling catheter in place, can discontinue 9/18 and monitor   - monitor, Replete to K>4, Phos>3, Mg>2, iCa>1  - hypernatremia 150,  Na improving  >148>147> 146, can d/c d5W, and give free h20 250ml q8 instead, monitor bmp q12      GASTROINTESTINAL  Transaminase elevation likely 2/2 combination of shock liver, malposition of ECMO cannula and liver dysfunction  - ALK normal 106, AST/ALT downtrending but remain elevated 240/760, T.bili continues to rise 9.8  - Acute hepatitis panel negative  - RUQ US 9/15 shows fatty infiltration of liver, negative for hepatobiliary pathology, will repeat RUQ ultrasound  - ECMO canula repositioned appropriately 9/14  - GI consult placed - f/u recs  - Triglycerides elevated 836, on insulin gtt for hyperglycemia, downtrending 406 > 271 >221 >173  - Episode of vomiting 9/16, tube feeds held and given reglan x48 hours, restarted TF 9/17, tolerating  - BM noted 9/17 and 9/18  - continue bowel regimen senna, miralax and movantik to avoid opioid induced constipation  - CT abdomen 9/15 w/o bowel obstruction, noted with colonic diverticulosis mostly in sigmoid, w/o evidence of diverticulitis  - continue PPI iso steroids   - nutrition following    INFECTIOUS DISEASE  Leukopenia likely transient? vs drug induced vs infection vs malignancy?  - WBC 0.19, remains afebrile however temperature has been regulated via ECMO circuit, will turn off and monitor for fever  - Bactrim DS discontinued iso leukopenia, continue Mepron for PJP prophylaxis instead  - heme following- thrombocytopenia noted as well as leukopenia, likely drug induced? filgrastim started 9/17  - s/p IV Ceftriaxone 5 days? at [facility] out of an abundance of precaution to cover BAL specimen  - s/p zosyn at OSH (9/12) for lingula PNA  - vancomycin (9/12-9/15 ) d/c'd negative mrsa PCR, and azithro (9/14-9/15) d/c'd neg Urine legionella  - can restart Vancomycin ppx (9/18-  ) by level iso leukopenia and c/w empiric donald (9/13- ) for 7 day course?   - draw vancomycin level prior to 4th dose  - 9/13  and 9/16 urine, sputum and blood cx ngtd  - positive COVID-19 antigen in late August  - f/u BAL, cultures, cytopathology, IL2 receptor  - ID consult for leukopenia - f/u recs      ENDOCRINE  # Hyperglycemia i/s/o steroids  Admission A1c 6.1  - hyperglycemia iso steroids, glucose remained elevated despite increase of NPH and ISS  - continue insulin gtt for tighter glycemic control, and   - elevated triglycerides 835, has hx of fatty liver, currently on propofol gtt. TG downtrending since initiating insulin gtt for hyperglycemia, now 173, continue to monitor daily  - TSH low at 0.13/Free T4 slightly low 0.7, will repeat in a few days      HEME  # ECMO anticoagulation  - continue argatroban gtt goal aPTT 50-70  - INR elevated likely iso argatroban and liver dysfxn  - platelets continue to downtrend, refractory after transfusion, 47  -> 51 -> 46  - s/p Vit. K (9/14) and FFP x1 (9/15), platelets x 4units (9/17, 9/18)  - fibrinogen 250, monitor daily  - Hgb remains stable 11.3  - Transfuse for Hgb <7, Plt<10  - Heme consult      #Leukopenia  #Thrombocytopenia  - Heme consult placed for thrombocytopenia, noted to also be leukopenic  - unlikely HLH/MAS since many abnormalities can be explained by severe hypoxemia/hypotension during initial decompensation prior to ecmo cannulation but some features that are consistent and with rheumatic disease it is a possibility to f/u hematology regarding utility of further lab/pathologic testing  - peripheral blood smear reviewed: large platelets noted, no platelet clumps, no blast cells noted, neutrophils noted w/ normal number of lobes, nucleated RBC noted  - acute hepatitis panel negative  - give filgrastim 480 daily until ANC >1500 in the setting of possible infxn  - GI consult     #R/O DVT  - LE duplex negative for DVT but will repeat  - f/u duplex- pending    MSK  Onset of debilitating b/l hand cramps and some muscles weakness x several months, unclear etiology, radiculopathy? vs myositis?   - MRI outpatient reportedly showed cervical spine issues, started on Prednisone shortly before admission at OSH  - f/u myomarkers  - seen by neurologist at [facility]   - symptoms improved with cellcept (9/8-9/11) but discontinued given Afib RVR   - f/u with Dr. Nik Marie or Dante Urbano for EMG upon discharge (osh?)      RHEUM  - started on Solumedrol 60mg q6h from 9/1 to 9/6 then weaned over time to then Medrol 32 mg 9/9 to 9/12 at [facility]  - s/p Cellcept 9/8 to 9/11 but stopped due to Afib- RVR/tachycardia   - CT findings, Improved/decreased extent of bilateral ground glass opacities and reticular markings compared to the previous study. Findings are nonspecific but differential etiologies include interstitial pneumonia, drug toxicity, nonspecific connective tissue disorders.  - OSH labs show strongly positive ARIANA, RNP, and anti smith antibodies with low C4 and normal C3. 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 First session  (9/15), next session planned for today 9/18 (will need to coordinate time with perfusion and blood bank)    SKIN  Reportedly had single episode of painful rash on her shins, ears and neck and chest which resolved with a steroid cream from a dermatologist prior to admission  - no evidence of rash currently      PROPHYLAXIS  # DVT: argatroban  # GI: TF      LINES  -Ecmo Cannulas  -LIJ TLC- 9/13 (placed at OSH)  -Left Axill [place] - 9/13 (placed at OSH)  -RA 16g PIV x2- 9/15      GOC  -Palliative Following    DISPO: ICU  CODE STATUS: Full     63 yo F w/ Afib initially presented to urgent care for SOB where she had an XR done concerning for b/l lower infiltrates, sent to [facility] ED and  admitted to [facility] on 8/26 after being found to be hypoxemic, reported having  debilitating b/l hand numbness/tingling and some muscles weakness several months. Found to be hypoxic and have interstitial lung disease appearance on CT, admitted 8/26 for AHRF, further ILD workup and management,  started on prednisone on admission with gradually improving O2 requirement and has been stable on 2-3LNC since 9/3, underwent bronchoscopy with TBBX on 8/30 which showed Non-Specific Intersitial Disease (NSIP)/OP. Labs notable for positive ARIANA 1:1280, RNP > 8, Alejandro > 8. Patient continued on steroids and recieved cellcept, which was discontinued 2/2 Afib-RVR. Interval CTA chest on 9/11 also negative PE did show possible lingula pneumonia.  Started on empiric vancomycin and zosyn 9/12, course was then c/b episode of SVT to 170s w/ rapidly progressive hypoxemic respiratory failure, placed NRB offered HFNC/BiPAP but refused, leading to worsening hypoxemic respiratory failure requiring intubation  9/13. Despite best practices, patient remained hypoxemic and patient was cannulated for VV-ECMO on 9/13 and transferred to [facility].      NEUROLOGY  Home meds: gabapentin 100g TID  AA&Ox3 at baseline   - opened eyes, moved LE and R upper arm spontaneously but did not following commands off sedation  - currently sedated w propfol and dilaudid i/s/o severe hypoxemic respiratory failure, plan to wean sedation to RASS -1/-2  - add precedex for anxiolysis if needed  - received cisatracurium IVP given for procedures. last given 9/14  - low threshold to paralyze if dysynchronus   - CT 9/14 no acute findings  - Palliative following  - Social Work consult  - PT consult         PULMONARY  Admitted to [facility] on 8/26 after p/w SOB, found to be hypoxemic, required 2-4L NC/bibap, initially responded well to IV steroids. 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  - rheum consulted for DAH  - continue with duonebs,  trialed IPV but vomited shortly after initiation, now discontinued,  - c/w empiric abx         ECMO  VV ECMO		  Cannulation sites/dates: 9/13/23 R fem 25F. RIJ 19F   Flow: 3.78		P1: 169  	Delta P: 25  RPM: 3000		P2: 144		SVO2: 78  Sweep:   3.5                  L/min:              FIO2:   100%      ECMO Calculated CO:  5.1 /  DO2/VO2:   VO2/DO2:     - 9/14 right groin cannula pulled-back ~3-4cm to position tip of cannula just inferior to hepatic vein   - clinically perfusing. Lactate: 1.8  - ECMO sweep sighing q1hr  - Adjust circuit flow as tolerated with DO2:VO2 goal >2  - Monitor for hemolysis, chatter, evidence of recirculation   - Plan to call CT surgery PA in the AM to suture right femoral cannula securely now that patient is more alert/awake      CARDIOVASCULAR  Hx of Afib w at OSH, started on Amiodarone gtt now in shock state requiring requiring pressors likely septic with component of vaspoplegia i/s/o sedation, possible contribution of cardiogenic from RV dysfunction   - No PE seen on invasive pulmonary angiography at time of ECMO cannulation or in recent imaging  - NO2 weaned off  (9/15) RV function remains unchanged  - s/p milrinone, off since (9/13)  - no longer requiring pressors to maintain map >65  - Roosevelt off since ( 9/14) and Vaso off since (9/15) Levo off since (9/18)  - converted to sinus (9/14) discontinued amio gtt iso bradycardia   - RITCHIE 9/14 : No MEREDITH thrombus noted, negative for PFO. LVOT diameter of 2 cm  - TTE 9/12 with EF 65-70% with normal LV and RV  - TTE 9/14 with  EF 18%. Severe global LV systolic dysfunction. RV enlargement with decreased RV systolic function, however RITCHIE and recent POCUS shows normal LV systolic function, improved RV systolic function         RENAL  sCr baseline 0.78  - Creatinine wnl, 0.49 today   - monitor i/o's, negative -2.8ml LOS/ negative -160mL 24hrs  - s/p diuresis with lasix, last administered 9/15, IVC 1.8cm  - now with hemoconcentrator to assist with fluid removal   - metabolic alkalosis, ?contraction, goal to keep net even given hypernatremia and elevated bicarb  - indwelling catheter in place, can discontinue 9/18 and monitor   - monitor, Replete to K>4, Phos>3, Mg>2, iCa>1  - hypernatremia 150,  Na improving  >148>147> 146, can d/c d5W, and give free h20 250ml q8 instead, monitor bmp q12      GASTROINTESTINAL  Transaminase elevation likely 2/2 combination of shock liver, malposition of ECMO cannula and liver dysfunction  - ALK normal 106, AST/ALT downtrending but remain elevated 240/760, T.bili continues to rise 9.8  - Acute hepatitis panel negative  - RUQ US 9/15 shows fatty infiltration of liver, negative for hepatobiliary pathology, will repeat RUQ ultrasound  - ECMO canula repositioned appropriately 9/14  - GI consult placed - f/u recs  - Triglycerides elevated 836, on insulin gtt for hyperglycemia, downtrending 406 > 271 >221 >173  - Episode of vomiting 9/16, tube feeds held and given reglan x48 hours, restarted TF 9/17, tolerating  - BM noted 9/17 and 9/18  - continue bowel regimen senna, miralax and movantik to avoid opioid induced constipation  - CT abdomen 9/15 w/o bowel obstruction, noted with colonic diverticulosis mostly in sigmoid, w/o evidence of diverticulitis  - continue PPI iso steroids   - nutrition following    INFECTIOUS DISEASE  Leukopenia likely transient? vs drug induced vs infection vs malignancy?  - WBC 0.19, remains afebrile however temperature has been regulated via ECMO circuit, will turn off and monitor for fever  - Bactrim DS discontinued iso leukopenia, continue Mepron for PJP prophylaxis instead  - heme following- thrombocytopenia noted as well as leukopenia, likely drug induced? filgrastim started 9/17  - s/p IV Ceftriaxone 5 days? at [facility] out of an abundance of precaution to cover BAL specimen  - s/p zosyn at OSH (9/12) for lingula PNA  - vancomycin (9/12-9/15 ) d/c'd negative mrsa PCR, and azithro (9/14-9/15) d/c'd neg Urine legionella  - can restart Vancomycin ppx (9/18-  ) by level iso leukopenia and c/w empiric donald (9/13- ) for 7 day course?   - draw vancomycin level prior to 4th dose  - 9/13 and 9/16 urine, sputum and blood cx ngtd  - positive COVID-19 antigen in late August  - f/u BAL, cultures, cytopathology, IL2 receptor  - ID consult for leukopenia - f/u recs      ENDOCRINE  # Hyperglycemia i/s/o steroids  Admission A1c 6.1  - hyperglycemia iso steroids, glucose remained elevated despite increase of NPH and ISS  - continue insulin gtt for tighter glycemic control, and   - elevated triglycerides 835, has hx of fatty liver, currently on propofol gtt. TG downtrending since initiating insulin gtt for hyperglycemia, now 173, continue to monitor daily  - TSH low at 0.13/Free T4 slightly low 0.7, will repeat in a few days      HEME  # ECMO anticoagulation  - continue argatroban gtt goal aPTT 50-70  - INR elevated likely iso argatroban and liver dysfxn  - platelets continue to downtrend, refractory after transfusion, 47  -> 51 -> 46  - s/p Vit. K (9/14) and FFP x1 (9/15), platelets x 4units (9/17, 9/18)  - fibrinogen 250, monitor daily  - Hgb remains stable 11.3  - Transfuse for Hgb <7, Plt<10  - Heme consult      #Leukopenia  #Thrombocytopenia  - Heme consult placed for thrombocytopenia, noted to also be leukopenic  - unlikely HLH/MAS since many abnormalities can be explained by severe hypoxemia/hypotension during initial decompensation prior to ecmo cannulation but some features that are consistent and with rheumatic disease it is a possibility to f/u hematology regarding utility of further lab/pathologic testing  - peripheral blood smear reviewed: large platelets noted, no platelet clumps, no blast cells noted, neutrophils noted w/ normal number of lobes, nucleated RBC noted  - acute hepatitis panel negative  - give filgrastim 480 daily until ANC >1500 in the setting of possible infxn       #R/O DVT  - LE duplex negative for DVT but will repeat  - f/u duplex- pending    MSK  Onset of debilitating b/l hand cramps and some muscles weakness x several months, unclear etiology, radiculopathy? vs myositis?   - MRI outpatient reportedly showed cervical spine issues, started on Prednisone shortly before admission at OSH  - f/u myomarkers  - seen by neurologist at [facility]   - symptoms improved with cellcept (9/8-9/11) but discontinued given Afib RVR   - f/u with Dr. Nik Marie or Dante Urbano for EMG upon discharge (osh?)      RHEUM  - started on Solumedrol 60mg q6h from 9/1 to 9/6 then weaned over time to then Medrol 32 mg 9/9 to 9/12 at [facility]  - s/p Cellcept 9/8 to 9/11 but stopped due to Afib- RVR/tachycardia   - CT findings, Improved/decreased extent of bilateral ground glass opacities and reticular markings compared to the previous study. Findings are nonspecific but differential etiologies include interstitial pneumonia, drug toxicity, nonspecific connective tissue disorders.  - OSH labs show strongly positive ARIANA, RNP, and anti smith antibodies with low C4 and normal C3. 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 First session  (9/15), next session planned for today 9/18 (will need to coordinate time with perfusion and blood bank)    SKIN  Reportedly had single episode of painful rash on her shins, ears and neck and chest which resolved with a steroid cream from a dermatologist prior to admission  - no evidence of rash currently      PROPHYLAXIS  # DVT: argatroban  # GI: TF      LINES  -Ecmo Cannulas  -[facility] TLC- 9/13 (placed at OSH)  -Left Axill [place] - 9/13 (placed at OSH)  -RA 16g PIV x2- 9/15      GOC  -Palliative Following    DISPO: ICU  CODE STATUS: Full

## 2023-09-18 NOTE — PROGRESS NOTE ADULT - ASSESSMENT
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 Initially admitted for acute hypoxemic respiratory failure and ILD-like findings, she responded to prednisone 40 mg daily but developed weakness/tachycardia with Mycophenolate mofetil 500mg BID (A fibr with RVR). Despite interventions, her condition worsened, intubated and ECMO transfer.    # Acute severe hepatitis -likely due to hypoperfusion   # SLE/ MCTD with DAH  # Pancytopenia, likely due to HLH vs drug induced vs sepsis (less likely)  - Skin rash on chest with periungual ulcers, ILD, leukopenia, thrombocytopenia.   - Serology: ARIANA 1:1280 Speckled, dsDNA <12. + SM, + Anti-RNP, U1-snRNP RNP A 162, U1-snRNP RNP-70kd 111  - CT chest (8/28): Since August 26, 2023, again interstitial lung disease non-UIP pattern. Although no subpleural sparing, possibly interstitialpneumonia, differential includes NSIP associated with connective tissue disorders, chronic HP or drug toxicity.  - Bronchoscopy results (08/30): Bronchial tissue and interstitium showing organizing pneumonia and focal fibrin  - CT chest (9/11): Improved/decreased extent of bilateral groundglass opacities and reticular markings compared to the previous study. Findings are nonspecific but differential etiologies include interstitial pneumonia, drug toxicity, nonspecific connective tissue disorders. New small focus of parenchymal consolidation seen in the lingula;   possible small focal pneumonia.  - Repeat Bronchoscopy (9/13): BAL performed of lingula. Serial BAL x3 performed of RML with progressively bloody return.  - Lab showed thrombocytopenia since she developed respiratory failure ( normal on the first admission to [facility])   - WBC 0.2, PLT 46, Hg 11.3 (9/18/23)  - LFT improving   - S/p Rituximab 1 gr on 9/16/23  - ID consulted - c/w meropenem  - Hematology consulted -Pt started on Filgrastim     Recommendations:  - Please decrease methylprednisolone to 80 mg IV daily from tomorrow   - C/w plasmapheresis (First session  (9/15), next session planned for today 9/18)  - Please order IL-2 receptor level (CD25), triglyceride, ferritin, and fibrinogen    Rheumatology will follow the patient     D/w Dr. 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 Initially admitted for acute hypoxemic respiratory failure and ILD-like findings, she responded to prednisone 40 mg daily but developed weakness/tachycardia with Mycophenolate mofetil 500mg BID (A fibr with RVR). Despite interventions, her condition worsened, intubated and ECMO transfer.    # Acute severe hepatitis -likely due to hypoperfusion   # SLE/ MCTD with DAH  # Pancytopenia, likely due to MAS vs drug induced vs sepsis (less likely)  - Skin rash on chest with periungual ulcers, ILD, leukopenia, thrombocytopenia.   - Serology: ARIANA 1:1280 Speckled, dsDNA <12. + SM, + Anti-RNP, U1-snRNP RNP A 162, U1-snRNP RNP-70kd 111  - CT chest (8/28): Since August 26, 2023, again interstitial lung disease non-UIP pattern. Although no subpleural sparing, possibly interstitialpneumonia, differential includes NSIP associated with connective tissue disorders, chronic HP or drug toxicity.  - Bronchoscopy results (08/30): Bronchial tissue and interstitium showing organizing pneumonia and focal fibrin  - CT chest (9/11): Improved/decreased extent of bilateral groundglass opacities and reticular markings compared to the previous study. Findings are nonspecific but differential etiologies include interstitial pneumonia, drug toxicity, nonspecific connective tissue disorders. New small focus of parenchymal consolidation seen in the lingula;   possible small focal pneumonia.  - Repeat Bronchoscopy (9/13): BAL performed of lingula. Serial BAL x3 performed of RML with progressively bloody return.  - Lab showed thrombocytopenia since she developed respiratory failure ( normal on the first admission to [facility])   - WBC 0.2, PLT 46, Hg 11.3 (9/18/23)  - LFT improving   - S/p Rituximab 1 gr on 9/16/23  - ID consulted - c/w meropenem  - Hematology consulted -Pt started on Filgrastim     Recommendations:  - Please decrease methylprednisolone to 80 mg IV daily from tomorrow   - C/w plasmapheresis (First session  (9/15), next session planned for today 9/18)  - Please order IL-2 receptor level (CD25), triglyceride, ferritin, fibrinogen, NK cell activity, CXCL9    Rheumatology will follow the patient     D/w Dr. Luz Hollingsworth MD  PGY-4  Rheumatology Fellow  Reachable on TEAMS  [phone]

## 2023-09-18 NOTE — PROGRESS NOTE ADULT - SUBJECTIVE AND OBJECTIVE BOX
INTERVAL HX:  Patient seen today, still intubated and on ECMO      HISTORY OF PRESENT ILLNESS:    62 year old female with a past medical history of afib, and sciatica, who was transferred from [facility] for ECMO. Patient was inittially presented [facility] for worsening shortness of breath, found to be hypoxic and CT chest revealed have interstitial lung disease appearance. She was admitted 8/26 for AHRF, further ILD workup and management. TTE 8/26 with normal LVEF. Pt was started on prednisone 40 mg IV daily  with gradually improving O2 requirement and has been stable on 2-3 LNC until 9/3. Rheuamtology was consulted, she was started on Mycophenolate mofetil 500 mg BID on 9/8 but she developed tachycardia and MMF was discontinueds.  Repat CTA chest (8/23) showed interval improvements in GGO but possible lingular bleb and RML bronchiectasis.  Started on empiric vancomycin and zosyn given concern for possible pneumonia on repeat CT scan.  Patient acceded to [facility] in which she desatted and presented with increased wob for which she was transitioned to BiPAP.  Patient saturation was dropped to 70s. STAT CXR showed increased pulmonary congestion for which patient was administered lasix without improvement. Intubated and started on mechanical ventilation but required very high PEEP (18) and 100% FiO2 and still had low saturations.  ECMO team was consulted and accepted to [facility] yesterday.  As per the EMR, she has been having chronic SOB for several months and got worked up in [place] where she had a CT scan in June, 23 that was negative for PE. Had "bronchial infection" in [place] 4 months ago and her symptoms have persisted since. She also states that she was seen by a pulmonologist and rheumatology, where they ruled out lupus and other immunological disorders. 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  Prior to hospitalization, She was having bilateral hand pain with paresthesias,  rash on chest wall, heaviness of legs without muscle weakness, oral ulcers, she was started on prednisone 20 mg bid prior to admission. The patient was Pt initialy went to the urgent care where she had a CXR performed concerning for b/l lower infiltrates and saturation was 88%  and was brought to the [facility] ED by car from the ambulance.     Denies any family history of lung disease, lung cancer, or autoimmune/rheumatological diseases..  Never smoker.    MEDICATIONS  (STANDING):  albuterol/ipratropium for Nebulization 3 milliLiter(s) Nebulizer every 6 hours  argatroban Infusion 0.15 MICROgram(s)/kG/Min (1.15 mL/Hr) IV Continuous <Continuous>  artificial  tears Solution 1 Drop(s) Both EYES every 12 hours  atovaquone  Suspension 1500 milliGRAM(s) Oral daily  chlorhexidine 0.12% Liquid 15 milliLiter(s) Oral Mucosa every 12 hours  chlorhexidine 2% Cloths 1 Application(s) Topical <User Schedule>  dexMEDEtomidine Infusion 0.2 MICROgram(s)/kG/Hr (6.38 mL/Hr) IV Continuous <Continuous>  dextrose 5%. 1000 milliLiter(s) (50 mL/Hr) IV Continuous <Continuous>  dextrose 5%. 1000 milliLiter(s) (100 mL/Hr) IV Continuous <Continuous>  dextrose 50% Injectable 25 Gram(s) IV Push once  dextrose 50% Injectable 12.5 Gram(s) IV Push once  dextrose 50% Injectable 25 Gram(s) IV Push once  filgrastim-sndz (ZARXIO) Injectable 480 MICROGram(s) SubCutaneous daily  folic acid 1 milliGRAM(s) Oral daily  glucagon  Injectable 1 milliGRAM(s) IntraMuscular once  insulin regular Infusion 2.8 Unit(s)/Hr (2.8 mL/Hr) IV Continuous <Continuous>  meropenem  IVPB      meropenem  IVPB 1000 milliGRAM(s) IV Intermittent every 8 hours  methylPREDNISolone sodium succinate Injectable 125 milliGRAM(s) IV Push daily  multivitamin/minerals/iron Oral Solution (CENTRUM) 15 milliLiter(s) Oral daily  naloxegol 25 milliGRAM(s) Oral daily  norepinephrine Infusion 0.3 MICROgram(s)/kG/Min (36.9 mL/Hr) IV Continuous <Continuous>  pantoprazole  Injectable 40 milliGRAM(s) IV Push daily  polyethylene glycol 3350 17 Gram(s) Oral <User Schedule>  senna Syrup 10 milliLiter(s) Oral two times a day  vancomycin  IVPB        MEDICATIONS  (PRN):  dextrose Oral Gel 15 Gram(s) Oral once PRN Blood Glucose LESS THAN 70 milliGRAM(s)/deciliter  diphenhydrAMINE Injectable 50 milliGRAM(s) IV Push once PRN chemotherapy reaction  meperidine     Injectable 25 milliGRAM(s) IV Push once PRN chemotherapy reaction      Allergies    No Known Allergies    Intolerances        PERTINENT MEDICATION HISTORY:      Social History:      PAST MEDICAL & SURGICAL HISTORY:      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EL HISTORY:    FAMILY HISTORY:      Vital Signs Last 24 Hrs  T(C): 35.8 (18 Sep 2023 17:00), Max: 35.8 (18 Sep 2023 09:00)  T(F): 96.5 (18 Sep 2023 17:00), Max: 96.5 (18 Sep 2023 09:00)  HR: 77 (18 Sep 2023 18:00) (62 - 85)  BP: --  BP(mean): --  RR: 18 (18 Sep 2023 18:00) (12 - 18)  SpO2: 100% (18 Sep 2023 18:00) (98% - 100%)    Parameters below as of 18 Sep 2023 18:00  Patient On (Oxygen Delivery Method): ventilator    O2 Concentration (%): 40    Physical Exam:  General: No apparent distress  HEENT: EOMI, MMM  CVS: +S1/S2, RRR, no murmurs/rubs/gallops  Resp: CTA b/l. No crackles/wheezing  GI: Soft, NT/ND +BS  MSK: no swelling/warmth/erythema of the joints of the UE/LE  Neuro: AAOx3  Skin: no visible rashes    LABS:                        11.3   0.20  )-----------( 46       ( 18 Sep 2023 12:35 )             35.5     09-18    145  |  104  |  47<H>  ----------------------------<  209<H>  4.5   |  33<H>  |  0.47<L>    Ca    8.6      18 Sep 2023 12:35  Phos  2.9     09-18  Mg     3.00     09-18    TPro  5.1<L>  /  Alb  3.1<L>  /  TBili  10.8<H>  /  DBili  x   /  AST  222<H>  /  ALT  738<H>  /  AlkPhos  127<H>  09-18    PT/INR - ( 18 Sep 2023 08:00 )   PT: 17.5 sec;   INR: 1.58 ratio         PTT - ( 18 Sep 2023 12:35 )  PTT:51.1 sec  Urinalysis Basic - ( 18 Sep 2023 12:35 )    Color: x / Appearance: x / SG: x / pH: x  Gluc: 209 mg/dL / Ketone: x  / Bili: x / Urobili: x   Blood: x / Protein: x / Nitrite: x   Leuk Esterase: x / RBC: x / WBC x   Sq Epi: x / Non Sq Epi: x / Bacteria: x        RADIOLOGY & ADDITIONAL STUDIES:      Rheumatology Work Up    Creatine Kinase, Serum: 43 U/L [25 - 170] (09-11-23 @ 05:30)  Protein/Creatinine Ratio Calculation: 0.2 Ratio [0.0 - 0.2] (08-31-23 @ 07:41)  Sedimentation Rate, Erythrocyte: 26 mm/Hr *H* [[facility] performs Erythrocyte Sedimentation Rate assay  utilizing the Westergren method] (08-30-23 @ 06:08)  C-Reactive Protein, Serum: 18.1 mg/L *H* [0.0 - 4.0] (08-30-23 @ 06:08)    Beta 2 Glycoprotein 1 Antibody Screen: Negative (09-14-23 @ 13:31)  Anticardiolipin Antibody Level, Total: Negative [Method: EIA] (09-14-23 @ 13:31)    ARIANA 1:1280 Speckled  DsDNA <12  SM (Alejandro) Ab FBIA >8  Anti-RNP >8  c-ANCA Negative  p-ANCA Negative  Atypical ANCA Indeterminate  Anti-SS-A Ab 0.4  Anti-SS-B Ab <0.2  RF Quant <10  CCP Ab IgG Negative  Centromere Ab <0.2  RNA Polymerase III IgG 16    Systemic Sclerosis 12 Ab Pnl 2 (08- 05:30)  CCP <8  Scl 70 <11  CENP-A <11  RP11 <11   <11  U1-snRNP RNP A 162  U1-snRNP RNP C 71  U1-snRNP RNP-70kd 111  Fibrillarin <11  Th/To <11  PM/Scl-100 <11  PM/Scl-75 <11    Hypersensitivity Pneumonitis Panel – Negative (08- 6:06)    Bronchoscopy results (08- 12:54)  -	Fungitell B-D Galactomanan 93   -	Aspergillus Galactomannan Antigen 1.75  -	Appearance – Hazy  -	Color – No Color  -	Total Nucleated Cell Count – 19   -	Total RBC Count – 1123   -	Neutrophils 13  -	BF Lymphocytes 2  -	Monocytes/Macrophages 4   -	Cytopathology  o	Final Diagnosis  o	BRONCHOALVEOLAR LAVAGE, RIGHT LOWER LOBE:  o	NEGATIVE FOR MALIGNANT CELLS.  o	Pulmonary macrophages, bronchial cells and squamous cells.  -	Surgical Pathology  o	Final Diagnosis  o	Lung, right lower lobe; biopsy:  o	Bronchial tissue and interstitium showing organizing pneumonia and focal fibrin - See Note.  o	Note: Multiple deeper levels were performed on block 1A. The histologic findings are that of organizing pneumonia and focal fibrin. No granuloma, neoplasm or interstitial fibrosis is identified. Clinical and radiographic correlation is suggested. This case was also reviewed virtually at the [facility]ide [facility]acic Pathology Consensus Conference on 8/31/2023, and again virtually to Dr. Andino on 9/1/2023.  -	TM Interpretation  o	Diagnosis:  o	Bronchioalveolar Lavage  o	Flow cytometric analysis attempted, however insufficient cells to report.      BRONCHOSCOPY (08- 11:30)  Procedure Performed: Bronchoscopy with BAL and TBBx    The bronchoscope was inserted with ease and the airway examined to subsegments bilaterally. BAL performed in RB8 followed by TBBX performed in RB8 3 subsegments. Patient tolerated procedure well.    Limitations/Complications:  There were no apparent limitations or complications    Findings:  Normal distal trachea and main [place]. Scant mucous bilaterally. No endobronchial lesions. Mildly ectatic airways.    Impressions:  Normal anatomy, mildly ectatic airways and scant secretions. .    Plan:  CXR in endo may need higher supplemental oxygen few hours after procedure, wean off, incentive spirometry    Specimens:  BAL for cell count, diff, CD4/8, micro, GM, fungitell and cyto. TBBX for path      RADIOLOGY & ADDITIONAL STUDIES:      CT Angio Chest PE Protocol w/ IV Cont (08.26.23 @ 15:10)   FINDINGS: No visualized PE.The pulmonary trunk measures 3.3 cm diameter.     The heart is enlarged.     Trace left lateral asymmetric pericardial effusion is seen.     The great vessels are unremarkable. Left vertebral artery arises   directly from the aortic arch.     No mediastinal or hilar lymphadenopathy is seen.    Mediastinal lipomatosis.    Evaluation of the pulmonary parenchyma demonstrates bilateral   interstitial lung disease in the upper, mid and lower zones with   peripheral groundglass opacities and reticulation. Less involvement of   the bilateral upper zones. No traction bronchiectasis or honeycombing.    Bilateral air trapping.     No pleural effusions are seen.    Limited evaluation of the upper abdomen demonstrates 1.6 cm hypodensity   in segment one likely cyst. Small to moderate hiatal hernia.    Evaluation of the osseous structures demonstrates no significant   abnormality.      IMPRESSION:  No visualized PE.    Bilateral interstitial lung disease. Differential diagnosis includes  HP,NSIP, atypical pneumonia.      CT Chest No Cont (08.28.23 @ 16:47) >  IMPRESSION:  1. Since August 26, 2023, again interstitial lung disease non-UIP   pattern. Although no subpleural sparing, possibly interstitialpneumonia,   differential includes NSIP associated with connective tissue disorders,   chronic HP or drug toxicity.      CT Angio Chest PE Protocol w/ IV Cont (09.11.23 @ 13:45) >  IMPRESSION:  1.  Improved/decreased extent of bilateral groundglass opacities and   reticular markings compared to the previous study. Findings are   nonspecific but differential etiologies include interstitial pneumonia,   drug toxicity, nonspecific connective tissue disorders.  2.  New small focus of parenchymal consolidation seen in the lingula;   possible small focal pneumonia.  3.  No pulmonary embolism      < end of copied text >    BRONCHOSCOPY (2nd)   · Procedure Date/Time	13-Sep-2023 17:18  · Pre-Bronchoscopy Tuberculosis Risk Assessment Screening(check 'Preview' tab for full text on these list values when selected)	I evaluated the patient prior to bronchoscopy procedure for active pulmonary/laryngeal M. tuberculosis disease and the risk and actions taken:    Low risk with routine standard of care measures followed.  · Informed Consent	Benefits, risks, and possible complications of procedure explained to patient/caregiver who verbalized understanding and gave written consent.  · Procedure Performed By	Miky Borden  · Procedure Details	  Bronchoscope inserted through ETT. ETT noted to be in good position. Airway evaluation revealed mild thick yellow secretions in trachea. Diffuse moderate thin green/brown secretions throughout. Secretions therapeutically suctioned. BAL performed of lingula. Serial BAL x3 performed of RML with progressively bloody return.  Bronchoscope then withdrawn from ETT.    · Specimens	Cell Count, Gram Stain and Culture, Fungal Culture, Acid Fast Culture, Cytopathology

## 2023-09-18 NOTE — CONSULT NOTE ADULT - ATTENDING COMMENTS
Shock liver with abrupt increase of AST/ALT from 70/100 to >700/>700 on 9/13, the day she was hypotensive wit hBP 80s/40s, was intubated and started on pressors and ECMO. Improving liver enzymes, bilirubin shows typical delay, today also mildly improved.   Continue current management.
62 year old with progressive dyspnea and hypoxia that has progressed over months.  She has some associated rash and arthralgia.    She was admitted from [facility] 8/26 through 9/13  Course complicated by SVT    She had progressive hypoxic respiratory failure  She was intubated  Now on VV ECMO.    S/p Course of Ceftriaxone in early September  She was on Bactrim for prophyaxis    At transfer here, she was given Meropenem / vancomycin    Now with neutropenia    Concern for underlying inflammatory problem    I would continue mepron for prophylaxis  Can continue meropenem pending count recovery  I would stop vancomycin
per above
I have seen the patient and reviewed the case, and agree with the assessment and plan of care as outlined in Dr. Lucero's note.  61 y/o F who initially presented with SOB and admitted to [facility] on 8/26 for hypoxemic respiratory failure, with CT imaging c/w interstitial lung disease and started on steroids Bronchoscopic evaluation on 8/30 c/w nonspecific interstitial disease. Patient subsequently transferred to [facility] MICU and is currently intubated for worsening hypoxemic respiratory failure requiring ECMO. 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 Labs and imaging studies reviewed in detail. Unclear etiology of leukopenia and thrombocytopenia, ddx includes underlying autoimmune/rheumatologic disease vs. medication induced (bactrim/meropenem) vs. infectious process in setting of immunosuppression. Thrombocytopenia may be associated with liver disease and being on ECMO. Recommend filgrastim daily until ANC >1500 in setting of possible infectious process. Please send hepatitis panel, HIV, TSH, folate/B12 and repeat fibrinogen. Daily CBC with diff. Plan of care discussed in detail with patient's partner at bedside. Will continue to closely monitor.

## 2023-09-18 NOTE — PROGRESS NOTE ADULT - ATTENDING COMMENTS
Agree with above. Seen and examined with team on rounds. Critically ill on VV ECMO with respiratory failure from some mixed connective tissue disease. Taper down ECMO circuit as tolerated. Off vasopressors. RV improved. The patient is neutropenic for unclear reasons. Heme following. Bili is rising likely due to previous shock liver and shock state. Close follow up and hepatology consult appreciated. Continue support. Family at bedside and updated.

## 2023-09-18 NOTE — CONSULT NOTE ADULT - ASSESSMENT
Zo Hager is a 62 year old female with a past medical history of afib who presented to [facility] for rapid deterioration of respiratory status.  ID is consulted to evaluate for need of antimicrobial ppx in setting of ECMO.

## 2023-09-18 NOTE — PROGRESS NOTE ADULT - SUBJECTIVE AND OBJECTIVE BOX
SUBJECTIVE AND OBJECTIVE: pt sedated and intubated    Indication for Geriatrics and Palliative Care Services/INTERVAL HPI: complex decision making and support in the setting of vv ECMO    OVERNIGHT EVENTS: no overnight events    DNR on chart: full code  Allergies    No Known Allergies    Intolerances    MEDICATIONS  (STANDING):  albuterol/ipratropium for Nebulization 3 milliLiter(s) Nebulizer every 6 hours  argatroban Infusion 0.15 MICROgram(s)/kG/Min (1.15 mL/Hr) IV Continuous <Continuous>  artificial  tears Solution 1 Drop(s) Both EYES every 12 hours  atovaquone  Suspension 1500 milliGRAM(s) Oral daily  calcium gluconate IVPB 2 Gram(s) IV Intermittent once  chlorhexidine 0.12% Liquid 15 milliLiter(s) Oral Mucosa every 12 hours  chlorhexidine 2% Cloths 1 Application(s) Topical <User Schedule>  dexMEDEtomidine Infusion 0.2 MICROgram(s)/kG/Hr (6.38 mL/Hr) IV Continuous <Continuous>  dextrose 5%. 1000 milliLiter(s) (50 mL/Hr) IV Continuous <Continuous>  dextrose 5%. 1000 milliLiter(s) (100 mL/Hr) IV Continuous <Continuous>  dextrose 5%. 1000 milliLiter(s) (100 mL/Hr) IV Continuous <Continuous>  dextrose 50% Injectable 25 Gram(s) IV Push once  dextrose 50% Injectable 12.5 Gram(s) IV Push once  dextrose 50% Injectable 25 Gram(s) IV Push once  filgrastim-sndz (ZARXIO) Injectable 480 MICROGram(s) SubCutaneous daily  folic acid 1 milliGRAM(s) Oral daily  glucagon  Injectable 1 milliGRAM(s) IntraMuscular once  HYDROmorphone Infusion. 1 mG/Hr (1 mL/Hr) IV Continuous <Continuous>  insulin regular Infusion 2.8 Unit(s)/Hr (2.8 mL/Hr) IV Continuous <Continuous>  meropenem  IVPB      meropenem  IVPB 1000 milliGRAM(s) IV Intermittent every 8 hours  methylPREDNISolone sodium succinate Injectable 125 milliGRAM(s) IV Push daily  multivitamin/minerals/iron Oral Solution (CENTRUM) 15 milliLiter(s) Oral daily  naloxegol 25 milliGRAM(s) Oral daily  norepinephrine Infusion 0.3 MICROgram(s)/kG/Min (36.9 mL/Hr) IV Continuous <Continuous>  pantoprazole  Injectable 40 milliGRAM(s) IV Push daily  polyethylene glycol 3350 17 Gram(s) Oral <User Schedule>  propofol Infusion 50 MICROgram(s)/kG/Min (38.3 mL/Hr) IV Continuous <Continuous>  senna Syrup 10 milliLiter(s) Oral two times a day  vancomycin  IVPB        MEDICATIONS  (PRN):  dextrose Oral Gel 15 Gram(s) Oral once PRN Blood Glucose LESS THAN 70 milliGRAM(s)/deciliter  diphenhydrAMINE Injectable 50 milliGRAM(s) IV Push once PRN chemotherapy reaction  meperidine     Injectable 25 milliGRAM(s) IV Push once PRN chemotherapy reaction    ITEMS UNCHECKED ARE NOT PRESENT    PRESENT SYMPTOMS: [x ]Unable to self-report - see [ x] CPOT [ ] PAINADS [x ] RDOS  Source if other than patient:  [ ]Family   [ ]Team     Pain:  [ ]yes [ ]no  QOL impact -   Location -                    Aggravating factors -  Quality -  Radiation -  Timing-  Severity (0-10 scale):  Minimal acceptable level (0-10 scale):     CPOT:  0  https://www.[facility].org/getattachment/jpf60a20-7d5o-5c4z-7v8a-0335w3704x4r/Critical-Care-Pain-Observation-Tool-(CPOT)    PAIN AD Score:	  [email or web address]/[email or web address].pdf (Ctrl + left click to view)    Dyspnea:                           [ ]Mild [ ]Moderate [ ]Severe    RDOS: 0  0 to 2  minimal or no respiratory distress   3  mild distress  4 to 6 moderate distress  >7 severe distress  [email or web address]://[email or web address]/[email or web address]_Distress_Observation_Scale.pdf (Ctrl +  left click to view)     Anxiety:                             [ ]Mild [ ]Moderate [ ]Severe  Fatigue:                             [ ]Mild [ ]Moderate [ ]Severe  Nausea:                             [ ]Mild [ ]Moderate [ ]Severe  Loss of appetite:              [ ]Mild [ ]Moderate [ ]Severe  Constipation:                    [ ]Mild [ ]Moderate [ ]Severe    PCSSQ[Palliative Care Spiritual Screening Question]   Severity (0-10): 8  Score of 4 or > indicate consideration of Chaplaincy referral.    Chaplaincy Referral: [x ] yes [ ] refused [ ] following    Other Symptoms:  [ ]All other review of systems negative     Palliative Performance Status Version 2: 20 %      [email or web address]://[email or web address]/[email or web address]_performance_scale_[email or web address].pdf    PHYSICAL EXAM:  Vital Signs Last 24 Hrs  T(C): 35.4 (18 Sep 2023 12:00), Max: 35.8 (17 Sep 2023 17:00)  T(F): 95.7 (18 Sep 2023 12:00), Max: 96.5 (17 Sep 2023 17:00)  HR: 68 (18 Sep 2023 12:00) (62 - 85)  BP: --  BP(mean): --  RR: 12 (18 Sep 2023 12:00) (12 - 16)  SpO2: 100% (18 Sep 2023 12:00) (98% - 100%)    Parameters below as of 18 Sep 2023 12:00  Patient On (Oxygen Delivery Method): ventilator    O2 Concentration (%): 40 I&O's Summary    17 Sep 2023 07:01  -  18 Sep 2023 07:00  --------------------------------------------------------  IN: 5274.8 mL / OUT: 5435 mL / NET: -160.2 mL    18 Sep 2023 07:01  -  18 Sep 2023 12:49  --------------------------------------------------------  IN: 1009.5 mL / OUT: 1560 mL / NET: -550.5 mL    GENERAL: [ ]Cachexia  [x] sedated, intubated  [ ]Alert  [ ]Oriented x   [ ]Lethargic  [x ]Unarousable  [ ]Verbal  [ ]Non-Verbal  Behavioral:   [ ]Anxiety  [ ]Delirium [ ]Agitation [ ]Other  HEENT:  [ ]Normal   [ ]Dry mouth   [x ]ET Tube/Trach  [ ]Oral lesions  PULMONARY:   [ ]Clear [ ]Tachypnea  [ ]Audible excessive secretions   [x ]Rhonchi        [ ]Right [ ]Left [ ]Bilateral  [ ]Crackles        [ ]Right [ ]Left [ ]Bilateral  [ ]Wheezing     [ ]Right [ ]Left [ ]Bilateral  [ ]Diminished BS [ ] Right [ ]Left [ ]Bilateral  CARDIOVASCULAR:    [x ]Regular [ ]Irregular [ ]Tachy  [ ]Everett [ ]Murmur [ ]Other  GASTROINTESTINAL:  [ x]Soft  [ x]Distended   [x ]+BS  [x ]Non tender [ ]Tender  [ ]Other [ ]PEG [ ]OGT/ NGT   Last BM:  9/17  GENITOURINARY:  [ ]Normal [ ]Incontinent   [ ]Oliguria/Anuria   [ x]Briseno  MUSCULOSKELETAL:   [ ]Normal   [ ]Weakness  [x ]Bed/Wheelchair bound [ ]Edema  NEUROLOGIC:   [ ]No focal deficits  [x ] Cognitive impairment  [ ] Dysphagia [ ]Dysarthria [ ] Paresis [ ]Other   SKIN:   [x ]Normal  [ ]Rash  [ ]Other  [ ]Pressure ulcer(s) [ ]y [ ]n present on admission    CRITICAL CARE:  [ ]Shock Present  [ ]Septic [ ]Cardiogenic [ ]Neurologic [ ]Hypovolemic  [ ]Vasopressors [ ]Inotropes  [ x]Respiratory failure present [ x]Mechanical Ventilation [ ]Non-invasive ventilatory support [ ]High-Flow Mode: AC/ CMV (Assist Control/ Continuous Mandatory Ventilation), RR (machine): 12, FiO2: 40, PEEP: 12, ITime: 1, MAP: 16, PC: 12, PIP: 33  [ ]Acute  [ ]Chronic [ ]Hypoxic  [ ]Hypercarbic [ ]Other  [ ]Other organ failure     LABS:                        11.1   0.19  )-----------( 51       ( 18 Sep 2023 08:00 )             34.8   09-18    146<H>  |  104  |  48<H>  ----------------------------<  173<H>  4.3   |  34<H>  |  0.49<L>    Ca    8.5      18 Sep 2023 08:00  Phos  2.9     09-18  Mg     3.00     09-18    TPro  5.1<L>  /  Alb  3.2<L>  /  TBili  9.8<H>  /  DBili  8.7<H>  /  AST  240<H>  /  ALT  760<H>  /  AlkPhos  106  09-18  PT/INR - ( 18 Sep 2023 08:00 )   PT: 17.5 sec;   INR: 1.58 ratio         PTT - ( 18 Sep 2023 08:00 )  PTT:54.8 sec    Urinalysis Basic - ( 18 Sep 2023 08:00 )    Color: x / Appearance: x / SG: x / pH: x  Gluc: 173 mg/dL / Ketone: x  / Bili: x / Urobili: x   Blood: x / Protein: x / Nitrite: x   Leuk Esterase: x / RBC: x / WBC x   Sq Epi: x / Non Sq Epi: x / Bacteria: x    RADIOLOGY & ADDITIONAL STUDIES:  < from: Xray Chest 1 View- PORTABLE-Urgent (Xray Chest 1 View- PORTABLE-Urgent .) (09.17.23 @ 22:18) >  FINDINGS:  Endotracheal tube tip in the midtrachea. Left IJ central venous catheter   tip in SVC. Enteric tube courses below the diaphragm and out of the   field-of-view. ECMO cannula is unchanged in position. Heart size is not   assessed in this projection. Left basilar opacity may represent pleural   effusion/atelectasis. Improving aeration bilaterally. No pneumothorax.    Protein Calorie Malnutrition Present: [ ]mild [ ]moderate [ ]severe [ ]underweight [ ]morbid obesity  https://www.andeal.org/vault/2440/web/files/ONC/Table_Clinical%20Characteristics%20to%20Document%20Malnutrition-White%20JV%20et%20al%702131.pdf    Height (cm): 172.7 (09-13-23 @ 15:32), 172.7 (09-13-23 @ 08:43)  Weight (kg): 127.6 (09-13-23 @ 17:00), 131.1 (09-13-23 @ 08:43)  BMI (kg/m2): 42.8 (09-13-23 @ 17:00), 44 (09-13-23 @ 15:32), 44 (09-13-23 @ 08:43)    [ ]PPSV2 < or = 30%  [ ]significant weight loss [ ]poor nutritional intake [ ]anasarca[ ]Artificial Nutrition    Other REFERRALS:  [ ]Hospice  [ ]Child Life  [ ]Social Work  [ ]Case management [ ]Holistic Therapy     Goals of Care Document:ROSA Augustin (09-14-23 @ 15:41)  Goals of Care Conversation:   Participants:  · Participants  Family; Staff  · Spouse  Kiara Delgado  · Provider  Dr. Metz  ·   Ember Pulliam    Advance Directives:  · Does patient have Advance Directive  Yes  · Indicate Type  Health Care Proxy (HCP)  · Agent's Name  Kiara Delgado  · Phone #  [phone]  · Caregiver:  no    Conversation Discussion:  · Conversation  Diagnosis; Treatment Options  · Conversation Details  Referral to palliative care team for complex decision making and symptom management in setting of VV ECMO. Pt was transferred from [facility] for ECMO.   Introduced palliative care team as an extra layer of support for the patient and family to provide an opportunity for the family to share their understanding/concerns regarding their loved one on the ECMO circuit. The goal is to elicit their understanding of the complexities of this form of life support, ensuring that the families have appropriate insight and information. Assessed family's support system and provided information regarding resources for support including: chaplaincy, child life, and caregiver support if necessary. Met with pt's partner Kiaar at bedside. She shared that she has been partners with Zo for 26 years. Kiara appears to understand  Zo's medical complexities. She is being realistic while maintaining hope that Zo's medical course will improve and hopes she will be able to come off the ECMO circuit. She recognizes that her partner is in a critical state receiving the highest form of life support.  Kiara was appreciative of palliative care support.   Caregiver  referral made; Kiara was open and receptive to additional support.    Location of Discussion:   Time Spent on Advance Care Planning:  Attending or [facility] Only.     I personally spent 46 minutes on advance care planning services with the patient. This time is separate and distinct from any other care management services provided on this date.    Location of Discussion:  · Location of discussion  Face to face      Electronic Signatures:  Ember Pulliam (MS)  (Signed 14-Sep-2023 15:55)  	Authored: Goals of Care Conversation, Location of Discussion  Anamika Alicea)  (Signed 14-Sep-2023 16:25)  	Authored: Location of Discussion  	Co-Signer: Goals of Care Conversation, Location of Discussion      Last Updated: 14-Sep-2023 16:25 by Anamika Alicea)

## 2023-09-18 NOTE — PROGRESS NOTE ADULT - SUBJECTIVE AND OBJECTIVE BOX
CHIEF COMPLAINT:    Interval Events:    REVIEW OF SYSTEMS:  Constitutional: [ ] negative [ ] fevers [ ] chills [ ] weight loss [ ] weight gain  HEENT: [ ] negative [ ] dry eyes [ ] eye irritation [ ] postnasal drip [ ] nasal congestion  CV: [ ] negative  [ ] chest pain [ ] orthopnea [ ] palpitations [ ] murmur  Resp: [ ] negative [ ] cough [ ] shortness of breath [ ] dyspnea [ ] wheezing [ ] sputum [ ] hemoptysis  GI: [ ] negative [ ] nausea [ ] vomiting [ ] diarrhea [ ] constipation [ ] abd pain [ ] dysphagia   : [ ] negative [ ] dysuria [ ] nocturia [ ] hematuria [ ] increased urinary frequency  Musculoskeletal: [ ] negative [ ] back pain [ ] myalgias [ ] arthralgias [ ] fracture  Skin: [ ] negative [ ] rash [ ] itch  Neurological: [ ] negative [ ] headache [ ] dizziness [ ] syncope [ ] weakness [ ] numbness  Psychiatric: [ ] negative [ ] anxiety [ ] depression  Endocrine: [ ] negative [ ] diabetes [ ] thyroid problem  Hematologic/Lymphatic: [ ] negative [ ] anemia [ ] bleeding problem  Allergic/Immunologic: [ ] negative [ ] itchy eyes [ ] nasal discharge [ ] hives [ ] angioedema  [ ] All other systems negative  [ ] Unable to assess ROS because ________    OBJECTIVE:  ICU Vital Signs Last 24 Hrs  T(C): 35.7 (18 Sep 2023 04:00), Max: 35.8 (17 Sep 2023 17:00)  T(F): 96.3 (18 Sep 2023 04:00), Max: 96.5 (17 Sep 2023 17:00)  HR: 85 (18 Sep 2023 05:45) (56 - 85)  BP: --  BP(mean): --  ABP: 129/70 (18 Sep 2023 05:45) (103/59 - 144/82)  ABP(mean): 95 (18 Sep 2023 05:45) (76 - 106)  RR: 13 (18 Sep 2023 05:45) (12 - 16)  SpO2: 100% (18 Sep 2023 05:45) (98% - 100%)    O2 Parameters below as of 18 Sep 2023 05:45  Patient On (Oxygen Delivery Method): ventilator, VV ECMO 100%    O2 Concentration (%): 40      Mode: AC/ CMV (Assist Control/ Continuous Mandatory Ventilation), RR (machine): 12, FiO2: 40, PEEP: 12, ITime: 1, MAP: 15, PC: 12, PIP: 24    09-16 @ 07:01  - 09-17 @ 07:00  --------------------------------------------------------  IN: 4119.9 mL / OUT: 6740 mL / NET: -2620.1 mL    09-17 @ 07:01  -  09-18 @ 06:09  --------------------------------------------------------  IN: 4412.2 mL / OUT: 4060 mL / NET: 352.2 mL      CAPILLARY BLOOD GLUCOSE      POCT Blood Glucose.: 128 mg/dL (18 Sep 2023 05:04)      PHYSICAL EXAM:  General:   HEENT:   Lymph Nodes:  Neck:   Respiratory:   Cardiovascular:   Abdomen:   Extremities:   Skin:   Neurological:  Psychiatry:    LINES:    HOSPITAL MEDICATIONS:  Standing Meds:  albuterol/ipratropium for Nebulization 3 milliLiter(s) Nebulizer every 6 hours  argatroban Infusion 0.15 MICROgram(s)/kG/Min IV Continuous <Continuous>  artificial  tears Solution 1 Drop(s) Both EYES every 12 hours  atovaquone  Suspension 1500 milliGRAM(s) Oral daily  chlorhexidine 0.12% Liquid 15 milliLiter(s) Oral Mucosa every 12 hours  chlorhexidine 2% Cloths 1 Application(s) Topical <User Schedule>  dexMEDEtomidine Infusion 0.2 MICROgram(s)/kG/Hr IV Continuous <Continuous>  dextrose 5%. 1000 milliLiter(s) IV Continuous <Continuous>  dextrose 5%. 1000 milliLiter(s) IV Continuous <Continuous>  dextrose 5%. 1000 milliLiter(s) IV Continuous <Continuous>  dextrose 50% Injectable 25 Gram(s) IV Push once  dextrose 50% Injectable 12.5 Gram(s) IV Push once  dextrose 50% Injectable 25 Gram(s) IV Push once  filgrastim-sndz (ZARXIO) Injectable 480 MICROGram(s) SubCutaneous daily  folic acid 1 milliGRAM(s) Oral daily  glucagon  Injectable 1 milliGRAM(s) IntraMuscular once  HYDROmorphone Infusion. 1 mG/Hr IV Continuous <Continuous>  insulin regular Infusion 2.8 Unit(s)/Hr IV Continuous <Continuous>  meropenem  IVPB      meropenem  IVPB 1000 milliGRAM(s) IV Intermittent every 8 hours  methylPREDNISolone sodium succinate Injectable 125 milliGRAM(s) IV Push daily  multivitamin/minerals/iron Oral Solution (CENTRUM) 15 milliLiter(s) Oral daily  naloxegol 25 milliGRAM(s) Oral daily  norepinephrine Infusion 0.3 MICROgram(s)/kG/Min IV Continuous <Continuous>  pantoprazole  Injectable 40 milliGRAM(s) IV Push daily  polyethylene glycol 3350 17 Gram(s) Oral <User Schedule>  propofol Infusion 50 MICROgram(s)/kG/Min IV Continuous <Continuous>  senna Syrup 10 milliLiter(s) Oral two times a day      PRN Meds:  dextrose Oral Gel 15 Gram(s) Oral once PRN  diphenhydrAMINE Injectable 50 milliGRAM(s) IV Push once PRN  meperidine     Injectable 25 milliGRAM(s) IV Push once PRN      LABS:                        11.3   0.21  )-----------( 47       ( 18 Sep 2023 04:00 )             36.7     Hgb Trend: 11.3<--, 11.5<--, 11.2<--, 11.9<--, 12.1<--  09-18    147<H>  |  104  |  51<H>  ----------------------------<  133<H>  4.2   |  34<H>  |  0.52    Ca    8.4      18 Sep 2023 04:00  Phos  2.9     09-18  Mg     3.00     09-18    TPro  4.7<L>  /  Alb  3.0<L>  /  TBili  9.1<H>  /  DBili  x   /  AST  249<H>  /  ALT  894<H>  /  AlkPhos  96  09-18    Creatinine Trend: 0.52<--, 0.55<--, 0.65<--, 0.71<--, 0.73<--, 0.80<--  PT/INR - ( 17 Sep 2023 22:00 )   PT: 19.2 sec;   INR: 1.73 ratio         PTT - ( 18 Sep 2023 04:00 )  PTT:54.6 sec  Urinalysis Basic - ( 18 Sep 2023 04:00 )    Color: x / Appearance: x / SG: x / pH: x  Gluc: 133 mg/dL / Ketone: x  / Bili: x / Urobili: x   Blood: x / Protein: x / Nitrite: x   Leuk Esterase: x / RBC: x / WBC x   Sq Epi: x / Non Sq Epi: x / Bacteria: x      Arterial Blood Gas:  09-18 @ 04:00  7.41/58/104/37/98.8/10.2  ABG lactate: --  Arterial Blood Gas:  09-17 @ 22:00  7.43/58/102/38/98.8/11.9  ABG lactate: --  Arterial Blood Gas:  09-17 @ 14:45  7.42/59/106/38/98.3/11.7  ABG lactate: --  Arterial Blood Gas:  09-17 @ 08:00  7.40/60/143/37/98.8/10.3  ABG lactate: --  Arterial Blood Gas:  09-17 @ 04:00  7.47/52/146/38/98.8/12.3  ABG lactate: --  Arterial Blood Gas:  09-16 @ 20:23  7.40/59/115/36/98.5/9.7  ABG lactate: --  Arterial Blood Gas:  09-16 @ 15:30  7.33/68/104/36/97.7/7.7  ABG lactate: --  Arterial Blood Gas:  09-16 @ 09:45  7.38/64/124/38/97.8/10.4  ABG lactate: --        MICROBIOLOGY:     Culture - Blood (collected 16 Sep 2023 10:30)  Source: .Blood Blood-Peripheral  Preliminary Report (17 Sep 2023 15:01):    No growth at 24 hours    Culture - Blood (collected 16 Sep 2023 10:25)  Source: .Blood Blood-Peripheral  Preliminary Report (17 Sep 2023 15:01):    No growth at 24 hours    Culture - Sputum (collected 16 Sep 2023 09:00)  Source: ET Tube ET Tube  Gram Stain (16 Sep 2023 16:01):    No polymorphonuclear cells seen per low power field    No Squamous epithelial cells per low power field    No organisms seen  Preliminary Report (17 Sep 2023 15:19):    Normal Respiratory Noreen present      RADIOLOGY:  [ ] Reviewed and interpreted by me    EKG:   Interval Events:  -Levophed weaned off overnight  -Transfused 2 units platelets   -D5W at 100ml/hr for hypernatremia    REVIEW OF SYSTEMS:  [ ] All other systems negative  [x] Unable to assess ROS because patient is sedated and intubated    OBJECTIVE:  ICU Vital Signs Last 24 Hrs  T(C): 35.7 (18 Sep 2023 04:00), Max: 35.8 (17 Sep 2023 17:00)  T(F): 96.3 (18 Sep 2023 04:00), Max: 96.5 (17 Sep 2023 17:00)  HR: 85 (18 Sep 2023 05:45) (56 - 85)  BP: --  BP(mean): --  ABP: 129/70 (18 Sep 2023 05:45) (103/59 - 144/82)  ABP(mean): 95 (18 Sep 2023 05:45) (76 - 106)  RR: 13 (18 Sep 2023 05:45) (12 - 16)  SpO2: 100% (18 Sep 2023 05:45) (98% - 100%)    O2 Parameters below as of 18 Sep 2023 05:45  Patient On (Oxygen Delivery Method): ventilator, VV ECMO 100%    O2 Concentration (%): 40      Mode: AC/ CMV (Assist Control/ Continuous Mandatory Ventilation), RR (machine): 12, FiO2: 40, PEEP: 12, ITime: 1, MAP: 15, PC: 12, PIP: 24    09-16 @ 07:01 - 09-17 @ 07:00  --------------------------------------------------------  IN: 4119.9 mL / OUT: 6740 mL / NET: -2620.1 mL    09-17 @ 07:01 - 09-18 @ 06:09  --------------------------------------------------------  IN: 4412.2 mL / OUT: 4060 mL / NET: 352.2 mL      CAPILLARY BLOOD GLUCOSE      POCT Blood Glucose.: 128 mg/dL (18 Sep 2023 05:04)      Physical Exam:   Constitutional: no acute distress   HEENT: + PERRLA, EOMI, no drainage or redness  Neck: supple, RIJ ECMO cannula, LIJ TLC  Respiratory: Ventilator assisted breath Sounds equal & diminished bilaterally to auscultation, no accessory muscle use noted  Cardiovascular: Sinus fady, regular rate, regular rhythm, normal S1, S2; no murmurs or rub  Gastrointestinal: Soft, non-tender, non distended, no hepatosplenomegaly, normal bowel sounds  Extremities: + 2 peripheral edema, R. Fem ECMO cannula  Vascular: Equal and normal pulses: 2+ peripheral pulses by doppler   Neurological: sedated/intubated  Psychiatric: calm, normal mood, normal affect  Musculoskeletal: No joint swelling or deformity; no limitation of movement  Skin: warm, dry, well perfused, no rashes, right fem cannula site with yellow tissue/slough      LINES:    HOSPITAL MEDICATIONS:  Standing Meds:  albuterol/ipratropium for Nebulization 3 milliLiter(s) Nebulizer every 6 hours  argatroban Infusion 0.15 MICROgram(s)/kG/Min IV Continuous <Continuous>  artificial  tears Solution 1 Drop(s) Both EYES every 12 hours  atovaquone  Suspension 1500 milliGRAM(s) Oral daily  chlorhexidine 0.12% Liquid 15 milliLiter(s) Oral Mucosa every 12 hours  chlorhexidine 2% Cloths 1 Application(s) Topical <User Schedule>  dexMEDEtomidine Infusion 0.2 MICROgram(s)/kG/Hr IV Continuous <Continuous>  dextrose 5%. 1000 milliLiter(s) IV Continuous <Continuous>  dextrose 5%. 1000 milliLiter(s) IV Continuous <Continuous>  dextrose 5%. 1000 milliLiter(s) IV Continuous <Continuous>  dextrose 50% Injectable 25 Gram(s) IV Push once  dextrose 50% Injectable 12.5 Gram(s) IV Push once  dextrose 50% Injectable 25 Gram(s) IV Push once  filgrastim-sndz (ZARXIO) Injectable 480 MICROGram(s) SubCutaneous daily  folic acid 1 milliGRAM(s) Oral daily  glucagon  Injectable 1 milliGRAM(s) IntraMuscular once  HYDROmorphone Infusion. 1 mG/Hr IV Continuous <Continuous>  insulin regular Infusion 2.8 Unit(s)/Hr IV Continuous <Continuous>  meropenem  IVPB      meropenem  IVPB 1000 milliGRAM(s) IV Intermittent every 8 hours  methylPREDNISolone sodium succinate Injectable 125 milliGRAM(s) IV Push daily  multivitamin/minerals/iron Oral Solution (CENTRUM) 15 milliLiter(s) Oral daily  naloxegol 25 milliGRAM(s) Oral daily  norepinephrine Infusion 0.3 MICROgram(s)/kG/Min IV Continuous <Continuous>  pantoprazole  Injectable 40 milliGRAM(s) IV Push daily  polyethylene glycol 3350 17 Gram(s) Oral <User Schedule>  propofol Infusion 50 MICROgram(s)/kG/Min IV Continuous <Continuous>  senna Syrup 10 milliLiter(s) Oral two times a day      PRN Meds:  dextrose Oral Gel 15 Gram(s) Oral once PRN  diphenhydrAMINE Injectable 50 milliGRAM(s) IV Push once PRN  meperidine     Injectable 25 milliGRAM(s) IV Push once PRN      LABS:                        11.3   0.21  )-----------( 47       ( 18 Sep 2023 04:00 )             36.7     Hgb Trend: 11.3<--, 11.5<--, 11.2<--, 11.9<--, 12.1<--  09-18    147<H>  |  104  |  51<H>  ----------------------------<  133<H>  4.2   |  34<H>  |  0.52    Ca    8.4      18 Sep 2023 04:00  Phos  2.9     09-18  Mg     3.00     09-18    TPro  4.7<L>  /  Alb  3.0<L>  /  TBili  9.1<H>  /  DBili  x   /  AST  249<H>  /  ALT  894<H>  /  AlkPhos  96  09-18    Creatinine Trend: 0.52<--, 0.55<--, 0.65<--, 0.71<--, 0.73<--, 0.80<--  PT/INR - ( 17 Sep 2023 22:00 )   PT: 19.2 sec;   INR: 1.73 ratio         PTT - ( 18 Sep 2023 04:00 )  PTT:54.6 sec  Urinalysis Basic - ( 18 Sep 2023 04:00 )    Color: x / Appearance: x / SG: x / pH: x  Gluc: 133 mg/dL / Ketone: x  / Bili: x / Urobili: x   Blood: x / Protein: x / Nitrite: x   Leuk Esterase: x / RBC: x / WBC x   Sq Epi: x / Non Sq Epi: x / Bacteria: x      Arterial Blood Gas:  09-18 @ 04:00  7.41/58/104/37/98.8/10.2  ABG lactate: --  Arterial Blood Gas:  09-17 @ 22:00  7.43/58/102/38/98.8/11.9  ABG lactate: --  Arterial Blood Gas:  09-17 @ 14:45  7.42/59/106/38/98.3/11.7  ABG lactate: --  Arterial Blood Gas:  09-17 @ 08:00  7.40/60/143/37/98.8/10.3  ABG lactate: --  Arterial Blood Gas:  09-17 @ 04:00  7.47/52/146/38/98.8/12.3  ABG lactate: --  Arterial Blood Gas:  09-16 @ 20:23  7.40/59/115/36/98.5/9.7  ABG lactate: --  Arterial Blood Gas:  09-16 @ 15:30  7.33/68/104/36/97.7/7.7  ABG lactate: --  Arterial Blood Gas:  09-16 @ 09:45  7.38/64/124/38/97.8/10.4  ABG lactate: --        MICROBIOLOGY:     Culture - Blood (collected 16 Sep 2023 10:30)  Source: .Blood Blood-Peripheral  Preliminary Report (17 Sep 2023 15:01):    No growth at 24 hours    Culture - Blood (collected 16 Sep 2023 10:25)  Source: .Blood Blood-Peripheral  Preliminary Report (17 Sep 2023 15:01):    No growth at 24 hours    Culture - Sputum (collected 16 Sep 2023 09:00)  Source: ET Tube ET Tube  Gram Stain (16 Sep 2023 16:01):    No polymorphonuclear cells seen per low power field    No Squamous epithelial cells per low power field    No organisms seen  Preliminary Report (17 Sep 2023 15:19):    Normal Respiratory Noreen present      RADIOLOGY:  [ ] Reviewed and interpreted by me    EKG:   Interval Events:  -Levophed weaned off overnight  -Transfused 2 units platelets   -D5W at 100ml/hr for hypernatremia    REVIEW OF SYSTEMS:  [ ] All other systems negative  [x] Unable to assess ROS because patient is sedated and intubated    OBJECTIVE:  ICU Vital Signs Last 24 Hrs  T(C): 35.7 (18 Sep 2023 04:00), Max: 35.8 (17 Sep 2023 17:00)  T(F): 96.3 (18 Sep 2023 04:00), Max: 96.5 (17 Sep 2023 17:00)  HR: 85 (18 Sep 2023 05:45) (56 - 85)  BP: --  BP(mean): --  ABP: 129/70 (18 Sep 2023 05:45) (103/59 - 144/82)  ABP(mean): 95 (18 Sep 2023 05:45) (76 - 106)  RR: 13 (18 Sep 2023 05:45) (12 - 16)  SpO2: 100% (18 Sep 2023 05:45) (98% - 100%)    O2 Parameters below as of 18 Sep 2023 05:45  Patient On (Oxygen Delivery Method): ventilator, VV ECMO 100%    O2 Concentration (%): 40      Mode: AC/ CMV (Assist Control/ Continuous Mandatory Ventilation), RR (machine): 12, FiO2: 40, PEEP: 12, ITime: 1, MAP: 15, PC: 12, PIP: 24    09-16 @ 07:01 - 09-17 @ 07:00  --------------------------------------------------------  IN: 4119.9 mL / OUT: 6740 mL / NET: -2620.1 mL    09-17 @ 07:01 - 09-18 @ 06:09  --------------------------------------------------------  IN: 4412.2 mL / OUT: 4060 mL / NET: 352.2 mL      CAPILLARY BLOOD GLUCOSE      POCT Blood Glucose.: 128 mg/dL (18 Sep 2023 05:04)      Physical Exam:   Constitutional: no acute distress   HEENT: +2 pupils sluggish response, sclera icterus  Neck: supple, RIJ ECMO cannula, LIJ TLC  Respiratory: Ventilator assisted breath Sounds equal & diminished bilaterally to auscultation, no accessory muscle use noted  Cardiovascular: Sinus fady, regular rate, regular rhythm, normal S1, S2; no murmurs or rub  Gastrointestinal: Soft, non-tender, non distended, no hepatosplenomegaly, normal bowel sounds  Extremities: + 2 peripheral edema, R. Fem ECMO cannula  Vascular: Equal and normal pulses: 2+ peripheral pulses by doppler   Neurological: sedated/intubated  Psychiatric: calm, normal mood, normal affect  Musculoskeletal: No joint swelling or deformity; no limitation of movement  Skin: warm, dry, well perfused, no rashes, right fem cannula site with yellow tissue/slough      LINES:    HOSPITAL MEDICATIONS:  Standing Meds:  albuterol/ipratropium for Nebulization 3 milliLiter(s) Nebulizer every 6 hours  argatroban Infusion 0.15 MICROgram(s)/kG/Min IV Continuous <Continuous>  artificial  tears Solution 1 Drop(s) Both EYES every 12 hours  atovaquone  Suspension 1500 milliGRAM(s) Oral daily  chlorhexidine 0.12% Liquid 15 milliLiter(s) Oral Mucosa every 12 hours  chlorhexidine 2% Cloths 1 Application(s) Topical <User Schedule>  dexMEDEtomidine Infusion 0.2 MICROgram(s)/kG/Hr IV Continuous <Continuous>  dextrose 5%. 1000 milliLiter(s) IV Continuous <Continuous>  dextrose 5%. 1000 milliLiter(s) IV Continuous <Continuous>  dextrose 5%. 1000 milliLiter(s) IV Continuous <Continuous>  dextrose 50% Injectable 25 Gram(s) IV Push once  dextrose 50% Injectable 12.5 Gram(s) IV Push once  dextrose 50% Injectable 25 Gram(s) IV Push once  filgrastim-sndz (ZARXIO) Injectable 480 MICROGram(s) SubCutaneous daily  folic acid 1 milliGRAM(s) Oral daily  glucagon  Injectable 1 milliGRAM(s) IntraMuscular once  HYDROmorphone Infusion. 1 mG/Hr IV Continuous <Continuous>  insulin regular Infusion 2.8 Unit(s)/Hr IV Continuous <Continuous>  meropenem  IVPB      meropenem  IVPB 1000 milliGRAM(s) IV Intermittent every 8 hours  methylPREDNISolone sodium succinate Injectable 125 milliGRAM(s) IV Push daily  multivitamin/minerals/iron Oral Solution (CENTRUM) 15 milliLiter(s) Oral daily  naloxegol 25 milliGRAM(s) Oral daily  norepinephrine Infusion 0.3 MICROgram(s)/kG/Min IV Continuous <Continuous>  pantoprazole  Injectable 40 milliGRAM(s) IV Push daily  polyethylene glycol 3350 17 Gram(s) Oral <User Schedule>  propofol Infusion 50 MICROgram(s)/kG/Min IV Continuous <Continuous>  senna Syrup 10 milliLiter(s) Oral two times a day      PRN Meds:  dextrose Oral Gel 15 Gram(s) Oral once PRN  diphenhydrAMINE Injectable 50 milliGRAM(s) IV Push once PRN  meperidine     Injectable 25 milliGRAM(s) IV Push once PRN      LABS:                        11.3   0.21  )-----------( 47       ( 18 Sep 2023 04:00 )             36.7     Hgb Trend: 11.3<--, 11.5<--, 11.2<--, 11.9<--, 12.1<--  09-18    147<H>  |  104  |  51<H>  ----------------------------<  133<H>  4.2   |  34<H>  |  0.52    Ca    8.4      18 Sep 2023 04:00  Phos  2.9     09-18  Mg     3.00     09-18    TPro  4.7<L>  /  Alb  3.0<L>  /  TBili  9.1<H>  /  DBili  x   /  AST  249<H>  /  ALT  894<H>  /  AlkPhos  96  09-18    Creatinine Trend: 0.52<--, 0.55<--, 0.65<--, 0.71<--, 0.73<--, 0.80<--  PT/INR - ( 17 Sep 2023 22:00 )   PT: 19.2 sec;   INR: 1.73 ratio         PTT - ( 18 Sep 2023 04:00 )  PTT:54.6 sec  Urinalysis Basic - ( 18 Sep 2023 04:00 )    Color: x / Appearance: x / SG: x / pH: x  Gluc: 133 mg/dL / Ketone: x  / Bili: x / Urobili: x   Blood: x / Protein: x / Nitrite: x   Leuk Esterase: x / RBC: x / WBC x   Sq Epi: x / Non Sq Epi: x / Bacteria: x      Arterial Blood Gas:  09-18 @ 04:00  7.41/58/104/37/98.8/10.2  ABG lactate: --  Arterial Blood Gas:  09-17 @ 22:00  7.43/58/102/38/98.8/11.9  ABG lactate: --  Arterial Blood Gas:  09-17 @ 14:45  7.42/59/106/38/98.3/11.7  ABG lactate: --  Arterial Blood Gas:  09-17 @ 08:00  7.40/60/143/37/98.8/10.3  ABG lactate: --  Arterial Blood Gas:  09-17 @ 04:00  7.47/52/146/38/98.8/12.3  ABG lactate: --  Arterial Blood Gas:  09-16 @ 20:23  7.40/59/115/36/98.5/9.7  ABG lactate: --  Arterial Blood Gas:  09-16 @ 15:30  7.33/68/104/36/97.7/7.7  ABG lactate: --  Arterial Blood Gas:  09-16 @ 09:45  7.38/64/124/38/97.8/10.4  ABG lactate: --        MICROBIOLOGY:     Culture - Blood (collected 16 Sep 2023 10:30)  Source: .Blood Blood-Peripheral  Preliminary Report (17 Sep 2023 15:01):    No growth at 24 hours    Culture - Blood (collected 16 Sep 2023 10:25)  Source: .Blood Blood-Peripheral  Preliminary Report (17 Sep 2023 15:01):    No growth at 24 hours    Culture - Sputum (collected 16 Sep 2023 09:00)  Source: ET Tube ET Tube  Gram Stain (16 Sep 2023 16:01):    No polymorphonuclear cells seen per low power field    No Squamous epithelial cells per low power field    No organisms seen  Preliminary Report (17 Sep 2023 15:19):    Normal Respiratory Noreen present      RADIOLOGY:  [ ] Reviewed and interpreted by me    EKG:

## 2023-09-18 NOTE — CONSULT NOTE ADULT - ASSESSMENT
61 yo F w/ Afib transferred to [facility] from [facility] after requiring VV ECMO for Acute hypoxemic respiratory failure. She was admitted to [facility] after c/o SOB x 3mos as well as b/l hand numbness/tingling and muscle weakness. Found to be hypoxic and have interstitial lung disease appearance on CT, admitted 8/26 for AHRF, further ILD workup and management,  started on prednisone on admission with gradually improving O2 requirement and has been stable on 2-3LNC since 9/3, underwent bronchoscopy with TBBX on 8/30 which showed Non-Specific Intersitial Disease (NSIP)/OP.  Patient continued on steroids and recieved cellcept, which was discontinued 2/2 Afib-RVR.  Interval CTA chest on 9/11 also negative PE did show possible lingula pneumonia. S/p CTX,  vancomycin and zosyn 9/12, course was then c/b episode of SVT to 170s w/ rapidly progressive hypoxemic respiratory failure, placed NRB offered HFNC/BiPAP but refused, leading to worsening hypoxemic respiratory failure requiring intubation 9/13, was hypotensive to 70s/40s and started on multiple pressors. Despite best practices, patient remained hypoxemic and patient was cannulated for VV-ECMO on 9/13 and transferred to [facility].  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 S/p pulse dose steroids x3 days, now 1mg/kg daily. PLEX session (9/15, 9/18), Rituximab 9/16 and receiving Vanco and Wilmer empirically. Found to have elevated liver enzymes initially attributed to shock liver, AST peaked at 1033 on 9/13, ALT initially improved then began to rise 1069 on 9/17, T. bili has continued to rise to 9.8 (direct 8.7) on 9/18. AST and ALT remain elevated but are downtrending. RUQ revealed fatty infiltration of liver, but no cholelithiasis or biliary ductal dilatation. Hepatology consulted for elevated liver enzymes and Tbili.    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 Please reconsult/page if questions.      **THIS NOTE IS NOT FINALIZED UNTIL SIGNED BY THE ATTENDING**    Sapna Spivey MD  GI Fellow, PGY-6  Available via [employer] Teams    NON-URGENT CONSULTS:  Please email [email or web address]  AT NIGHT AND ON WEEKENDS:  Contact on-call GI fellow via answering service ([phone]) from 5pm-8am and on weekends/holidays  MONDAY-FRIDAY 8AM-5PM:  Pager# [phone] ([facility]) or [phone] ([facility])

## 2023-09-18 NOTE — PROGRESS NOTE ADULT - ASSESSMENT
62 year old female with a past medical history of afib, and sciatica, who presented to [facility] for shortness of breath. Transferred from [facility] for vvECMO. Palliative Care consulted for complex decision making in the setting of serious illness.

## 2023-09-19 LAB
ALBUMIN SERPL ELPH-MCNC: 2.5 G/DL — LOW (ref 3.3–5)
ALBUMIN SERPL ELPH-MCNC: 3 G/DL — LOW (ref 3.3–5)
ALBUMIN SERPL ELPH-MCNC: 3.1 G/DL — LOW (ref 3.3–5)
ALBUMIN SERPL ELPH-MCNC: 3.2 G/DL — LOW (ref 3.3–5)
ALP SERPL-CCNC: 153 U/L — HIGH (ref 40–120)
ALP SERPL-CCNC: 160 U/L — HIGH (ref 40–120)
ALP SERPL-CCNC: 182 U/L — HIGH (ref 40–120)
ALP SERPL-CCNC: 200 U/L — HIGH (ref 40–120)
ALT FLD-CCNC: 289 U/L — HIGH (ref 4–33)
ALT FLD-CCNC: 340 U/L — HIGH (ref 4–33)
ALT FLD-CCNC: 355 U/L — HIGH (ref 4–33)
ALT FLD-CCNC: 360 U/L — HIGH (ref 4–33)
AMMONIA BLD-MCNC: 27 UMOL/L — SIGNIFICANT CHANGE UP (ref 11–55)
ANION GAP SERPL CALC-SCNC: 10 MMOL/L — SIGNIFICANT CHANGE UP (ref 7–14)
ANION GAP SERPL CALC-SCNC: 7 MMOL/L — SIGNIFICANT CHANGE UP (ref 7–14)
APTT BLD: 46.6 SEC — HIGH (ref 24.5–35.6)
APTT BLD: 50.1 SEC — HIGH (ref 24.5–35.6)
APTT BLD: 50.1 SEC — HIGH (ref 24.5–35.6)
APTT BLD: 50.4 SEC — HIGH (ref 24.5–35.6)
APTT BLD: 51.9 SEC — HIGH (ref 24.5–35.6)
APTT BLD: 53.2 SEC — HIGH (ref 24.5–35.6)
APTT BLD: 55 SEC — HIGH (ref 24.5–35.6)
AST SERPL-CCNC: 122 U/L — HIGH (ref 4–32)
AST SERPL-CCNC: 123 U/L — HIGH (ref 4–32)
AST SERPL-CCNC: 87 U/L — HIGH (ref 4–32)
AST SERPL-CCNC: 98 U/L — HIGH (ref 4–32)
BASE EXCESS BLDCOV CALC-SCNC: 13.6 MMOL/L — HIGH (ref -3–2)
BASE EXCESS BLDCOV CALC-SCNC: 9.7 MMOL/L — HIGH (ref -3–2)
BASOPHILS # BLD AUTO: 0 K/UL — SIGNIFICANT CHANGE UP (ref 0–0.2)
BASOPHILS # BLD AUTO: 0.01 K/UL — SIGNIFICANT CHANGE UP (ref 0–0.2)
BASOPHILS # BLD AUTO: 0.02 K/UL — SIGNIFICANT CHANGE UP (ref 0–0.2)
BASOPHILS NFR BLD AUTO: 0 % — SIGNIFICANT CHANGE UP (ref 0–2)
BASOPHILS NFR BLD AUTO: 2.7 % — HIGH (ref 0–2)
BASOPHILS NFR BLD AUTO: 2.8 % — HIGH (ref 0–2)
BILIRUB SERPL-MCNC: 10.2 MG/DL — HIGH (ref 0.2–1.2)
BILIRUB SERPL-MCNC: 11.5 MG/DL — HIGH (ref 0.2–1.2)
BILIRUB SERPL-MCNC: 8.6 MG/DL — HIGH (ref 0.2–1.2)
BILIRUB SERPL-MCNC: 9 MG/DL — HIGH (ref 0.2–1.2)
BLOOD GAS ARTERIAL - LYTES,HGB,ICA,LACT RESULT: SIGNIFICANT CHANGE UP
BLOOD GAS ECMO POST MEMBRANE - ARTERIAL RESULT: SIGNIFICANT CHANGE UP
BLOOD GAS ECMO POST MEMBRANE - ARTERIAL RESULT: SIGNIFICANT CHANGE UP
BLOOD GAS ECMO PRE MEMBRANE - VENOUS RESULT: SIGNIFICANT CHANGE UP
BLOOD GAS ECMO PRE MEMBRANE - VENOUS RESULT: SIGNIFICANT CHANGE UP
BLOOD GAS PRE MEMBRANE - GLUCOSE: 169 MG/DL — HIGH (ref 70–99)
BLOOD GAS PRE MEMBRANE - GLUCOSE: 208 MG/DL — HIGH (ref 70–99)
BLOOD GAS PRE MEMBRANE - ICALCIUM: 1.14 MMOL/L — LOW (ref 1.15–1.29)
BLOOD GAS PRE MEMBRANE - ICALCIUM: 1.2 MMOL/L — SIGNIFICANT CHANGE UP (ref 1.15–1.29)
BLOOD GAS PRE MEMBRANE - POTASSIUM: 3.5 MMOL/L — SIGNIFICANT CHANGE UP (ref 3.4–4.5)
BLOOD GAS PRE MEMBRANE - POTASSIUM: 4.6 MMOL/L — HIGH (ref 3.4–4.5)
BLOOD GAS PRE MEMBRANE - SODIUM: 139 MMOL/L — SIGNIFICANT CHANGE UP (ref 136–146)
BLOOD GAS PRE MEMBRANE - SODIUM: 142 MMOL/L — SIGNIFICANT CHANGE UP (ref 136–146)
BUN SERPL-MCNC: 40 MG/DL — HIGH (ref 7–23)
BUN SERPL-MCNC: 44 MG/DL — HIGH (ref 7–23)
BUN SERPL-MCNC: 50 MG/DL — HIGH (ref 7–23)
BUN SERPL-MCNC: 51 MG/DL — HIGH (ref 7–23)
CALCIUM SERPL-MCNC: 6.9 MG/DL — LOW (ref 8.4–10.5)
CALCIUM SERPL-MCNC: 8.9 MG/DL — SIGNIFICANT CHANGE UP (ref 8.4–10.5)
CALCIUM SERPL-MCNC: 9 MG/DL — SIGNIFICANT CHANGE UP (ref 8.4–10.5)
CALCIUM SERPL-MCNC: 9 MG/DL — SIGNIFICANT CHANGE UP (ref 8.4–10.5)
CHLORIDE SERPL-SCNC: 101 MMOL/L — SIGNIFICANT CHANGE UP (ref 98–107)
CHLORIDE SERPL-SCNC: 102 MMOL/L — SIGNIFICANT CHANGE UP (ref 98–107)
CHLORIDE SERPL-SCNC: 102 MMOL/L — SIGNIFICANT CHANGE UP (ref 98–107)
CHLORIDE SERPL-SCNC: 108 MMOL/L — HIGH (ref 98–107)
CLOSURE TME COLL+EPINEP BLD: 26 K/UL — LOW (ref 150–400)
CO2 SERPL-SCNC: 27 MMOL/L — SIGNIFICANT CHANGE UP (ref 22–31)
CO2 SERPL-SCNC: 33 MMOL/L — HIGH (ref 22–31)
CO2 SERPL-SCNC: 33 MMOL/L — HIGH (ref 22–31)
CO2 SERPL-SCNC: 34 MMOL/L — HIGH (ref 22–31)
COHGB MFR BLDA: 1.7 % — HIGH (ref 0.5–1.5)
COHGB MFR BLDA: 1.8 % — HIGH (ref 0.5–1.5)
CREAT SERPL-MCNC: 0.31 MG/DL — LOW (ref 0.5–1.3)
CREAT SERPL-MCNC: 0.36 MG/DL — LOW (ref 0.5–1.3)
CREAT SERPL-MCNC: 0.4 MG/DL — LOW (ref 0.5–1.3)
CREAT SERPL-MCNC: 0.44 MG/DL — LOW (ref 0.5–1.3)
EGFR: 108 ML/MIN/1.73M2 — SIGNIFICANT CHANGE UP
EGFR: 110 ML/MIN/1.73M2 — SIGNIFICANT CHANGE UP
EGFR: 113 ML/MIN/1.73M2 — SIGNIFICANT CHANGE UP
EGFR: 117 ML/MIN/1.73M2 — SIGNIFICANT CHANGE UP
EJ: NEGATIVE — SIGNIFICANT CHANGE UP
ENA JO1 AB SER IA-ACNC: <20 UNITS — SIGNIFICANT CHANGE UP
ENA PM/SCL AB SER-ACNC: <20 UNITS — SIGNIFICANT CHANGE UP
ENA SM+RNP AB SER IA-ACNC: 134 UNITS — HIGH
ENA SS-A IGG SER QL: <20 UNITS — SIGNIFICANT CHANGE UP
EOSINOPHIL # BLD AUTO: 0 K/UL — SIGNIFICANT CHANGE UP (ref 0–0.5)
EOSINOPHIL # BLD AUTO: 0.01 K/UL — SIGNIFICANT CHANGE UP (ref 0–0.5)
EOSINOPHIL # BLD AUTO: 0.01 K/UL — SIGNIFICANT CHANGE UP (ref 0–0.5)
EOSINOPHIL NFR BLD AUTO: 0 % — SIGNIFICANT CHANGE UP (ref 0–6)
EOSINOPHIL NFR BLD AUTO: 2.8 % — SIGNIFICANT CHANGE UP (ref 0–6)
EOSINOPHIL NFR BLD AUTO: 3.4 % — SIGNIFICANT CHANGE UP (ref 0–6)
FERRITIN SERPL-MCNC: 274 NG/ML — SIGNIFICANT CHANGE UP (ref 13–330)
FIBRILLARIN AB SER QL: NEGATIVE — SIGNIFICANT CHANGE UP
FIBRINOGEN PPP-MCNC: 205 MG/DL — SIGNIFICANT CHANGE UP (ref 200–465)
FIBRINOGEN PPP-MCNC: 225 MG/DL — SIGNIFICANT CHANGE UP (ref 200–465)
GAMMA INTERFERON BACKGROUND BLD IA-ACNC: 0.01 IU/ML — SIGNIFICANT CHANGE UP
GLUCOSE BLDC GLUCOMTR-MCNC: 130 MG/DL — HIGH (ref 70–99)
GLUCOSE BLDC GLUCOMTR-MCNC: 136 MG/DL — HIGH (ref 70–99)
GLUCOSE BLDC GLUCOMTR-MCNC: 140 MG/DL — HIGH (ref 70–99)
GLUCOSE BLDC GLUCOMTR-MCNC: 149 MG/DL — HIGH (ref 70–99)
GLUCOSE BLDC GLUCOMTR-MCNC: 157 MG/DL — HIGH (ref 70–99)
GLUCOSE BLDC GLUCOMTR-MCNC: 164 MG/DL — HIGH (ref 70–99)
GLUCOSE BLDC GLUCOMTR-MCNC: 186 MG/DL — HIGH (ref 70–99)
GLUCOSE BLDC GLUCOMTR-MCNC: 189 MG/DL — HIGH (ref 70–99)
GLUCOSE BLDC GLUCOMTR-MCNC: 205 MG/DL — HIGH (ref 70–99)
GLUCOSE BLDC GLUCOMTR-MCNC: 211 MG/DL — HIGH (ref 70–99)
GLUCOSE BLDC GLUCOMTR-MCNC: 215 MG/DL — HIGH (ref 70–99)
GLUCOSE BLDC GLUCOMTR-MCNC: 225 MG/DL — HIGH (ref 70–99)
GLUCOSE BLDC GLUCOMTR-MCNC: 229 MG/DL — HIGH (ref 70–99)
GLUCOSE BLDC GLUCOMTR-MCNC: 242 MG/DL — HIGH (ref 70–99)
GLUCOSE BLDC GLUCOMTR-MCNC: 245 MG/DL — HIGH (ref 70–99)
GLUCOSE SERPL-MCNC: 192 MG/DL — HIGH (ref 70–99)
GLUCOSE SERPL-MCNC: 197 MG/DL — HIGH (ref 70–99)
GLUCOSE SERPL-MCNC: 234 MG/DL — HIGH (ref 70–99)
GLUCOSE SERPL-MCNC: 267 MG/DL — HIGH (ref 70–99)
GRAM STN FLD: SIGNIFICANT CHANGE UP
HAPTOGLOB SERPL-MCNC: <20 MG/DL — LOW (ref 34–200)
HAPTOGLOB SERPL-MCNC: <20 MG/DL — LOW (ref 34–200)
HCO3, PRE MEMBRANE VENOUS: 34 MMOL/L — HIGH (ref 20–27)
HCO3, PRE MEMBRANE VENOUS: 39 MMOL/L — HIGH (ref 20–27)
HCT VFR BLD CALC: 33.5 % — LOW (ref 34.5–45)
HCT VFR BLD CALC: 33.9 % — LOW (ref 34.5–45)
HCT VFR BLD CALC: 36.2 % — SIGNIFICANT CHANGE UP (ref 34.5–45)
HCT VFR BLD CALC: 36.3 % — SIGNIFICANT CHANGE UP (ref 34.5–45)
HCT VFR BLD CALC: 36.8 % — SIGNIFICANT CHANGE UP (ref 34.5–45)
HCT VFR BLD CALC: 36.9 % — SIGNIFICANT CHANGE UP (ref 34.5–45)
HGB BLD-MCNC: 10.9 G/DL — LOW (ref 11.5–15.5)
HGB BLD-MCNC: 11.1 G/DL — LOW (ref 11.5–15.5)
HGB BLD-MCNC: 11.6 G/DL — SIGNIFICANT CHANGE UP (ref 11.5–15.5)
HGB BLD-MCNC: 11.6 G/DL — SIGNIFICANT CHANGE UP (ref 11.5–15.5)
HGB BLD-MCNC: 11.7 G/DL — SIGNIFICANT CHANGE UP (ref 11.5–15.5)
HGB BLD-MCNC: 12 G/DL — SIGNIFICANT CHANGE UP (ref 11.5–15.5)
IANC: 0.01 K/UL — LOW (ref 1.8–7.4)
IANC: 0.02 K/UL — LOW (ref 1.8–7.4)
IANC: 0.03 K/UL — LOW (ref 1.8–7.4)
IANC: 0.13 K/UL — LOW (ref 1.8–7.4)
IANC: 0.13 K/UL — LOW (ref 1.8–7.4)
IANC: 0.32 K/UL — LOW (ref 1.8–7.4)
IMM GRANULOCYTES NFR BLD AUTO: 0 % — SIGNIFICANT CHANGE UP (ref 0–0.9)
IMM GRANULOCYTES NFR BLD AUTO: 7.4 % — HIGH (ref 0–0.9)
INR BLD: 1.43 RATIO — HIGH (ref 0.85–1.18)
INR BLD: 1.5 RATIO — HIGH (ref 0.85–1.18)
INR BLD: 1.54 RATIO — HIGH (ref 0.85–1.18)
KU AB SER QL: ABNORMAL
LACTATE, PRE MEMBRANE VENOUS: 1.7 MMOL/L — SIGNIFICANT CHANGE UP (ref 0.5–2)
LACTATE, PRE MEMBRANE VENOUS: 2 MMOL/L — SIGNIFICANT CHANGE UP (ref 0.5–2)
LDH SERPL L TO P-CCNC: 382 U/L — HIGH (ref 135–225)
LDH SERPL L TO P-CCNC: 404 U/L — HIGH (ref 135–225)
LYMPHOCYTES # BLD AUTO: 0.07 K/UL — LOW (ref 1–3.3)
LYMPHOCYTES # BLD AUTO: 0.09 K/UL — LOW (ref 1–3.3)
LYMPHOCYTES # BLD AUTO: 0.11 K/UL — LOW (ref 1–3.3)
LYMPHOCYTES # BLD AUTO: 0.12 K/UL — LOW (ref 1–3.3)
LYMPHOCYTES # BLD AUTO: 0.16 K/UL — LOW (ref 1–3.3)
LYMPHOCYTES # BLD AUTO: 0.18 K/UL — LOW (ref 1–3.3)
LYMPHOCYTES # BLD AUTO: 19.4 % — SIGNIFICANT CHANGE UP (ref 13–44)
LYMPHOCYTES # BLD AUTO: 24.3 % — SIGNIFICANT CHANGE UP (ref 13–44)
LYMPHOCYTES # BLD AUTO: 25.7 % — SIGNIFICANT CHANGE UP (ref 13–44)
LYMPHOCYTES # BLD AUTO: 44.4 % — HIGH (ref 13–44)
LYMPHOCYTES # BLD AUTO: 52.4 % — HIGH (ref 13–44)
LYMPHOCYTES # BLD AUTO: 55.2 % — HIGH (ref 13–44)
M TB IFN-G BLD-IMP: ABNORMAL
M TB IFN-G CD4+ BCKGRND COR BLD-ACNC: 0 IU/ML — SIGNIFICANT CHANGE UP
M TB IFN-G CD4+CD8+ BCKGRND COR BLD-ACNC: 0 IU/ML — SIGNIFICANT CHANGE UP
MAGNESIUM SERPL-MCNC: 2 MG/DL — SIGNIFICANT CHANGE UP (ref 1.6–2.6)
MAGNESIUM SERPL-MCNC: 2.6 MG/DL — SIGNIFICANT CHANGE UP (ref 1.6–2.6)
MAGNESIUM SERPL-MCNC: 2.6 MG/DL — SIGNIFICANT CHANGE UP (ref 1.6–2.6)
MAGNESIUM SERPL-MCNC: 2.7 MG/DL — HIGH (ref 1.6–2.6)
MCHC RBC-ENTMCNC: 29.9 PG — SIGNIFICANT CHANGE UP (ref 27–34)
MCHC RBC-ENTMCNC: 30.1 PG — SIGNIFICANT CHANGE UP (ref 27–34)
MCHC RBC-ENTMCNC: 30.3 PG — SIGNIFICANT CHANGE UP (ref 27–34)
MCHC RBC-ENTMCNC: 30.5 PG — SIGNIFICANT CHANGE UP (ref 27–34)
MCHC RBC-ENTMCNC: 30.5 PG — SIGNIFICANT CHANGE UP (ref 27–34)
MCHC RBC-ENTMCNC: 30.6 PG — SIGNIFICANT CHANGE UP (ref 27–34)
MCHC RBC-ENTMCNC: 31.4 GM/DL — LOW (ref 32–36)
MCHC RBC-ENTMCNC: 32 GM/DL — SIGNIFICANT CHANGE UP (ref 32–36)
MCHC RBC-ENTMCNC: 32.2 GM/DL — SIGNIFICANT CHANGE UP (ref 32–36)
MCHC RBC-ENTMCNC: 32.5 GM/DL — SIGNIFICANT CHANGE UP (ref 32–36)
MCHC RBC-ENTMCNC: 32.6 GM/DL — SIGNIFICANT CHANGE UP (ref 32–36)
MCHC RBC-ENTMCNC: 32.7 GM/DL — SIGNIFICANT CHANGE UP (ref 32–36)
MCV RBC AUTO: 92.9 FL — SIGNIFICANT CHANGE UP (ref 80–100)
MCV RBC AUTO: 93.4 FL — SIGNIFICANT CHANGE UP (ref 80–100)
MCV RBC AUTO: 93.8 FL — SIGNIFICANT CHANGE UP (ref 80–100)
MCV RBC AUTO: 93.8 FL — SIGNIFICANT CHANGE UP (ref 80–100)
MCV RBC AUTO: 94.5 FL — SIGNIFICANT CHANGE UP (ref 80–100)
MCV RBC AUTO: 95.1 FL — SIGNIFICANT CHANGE UP (ref 80–100)
MDA-5 (P140)(CADM-140): <20 UNITS — SIGNIFICANT CHANGE UP
METHGB MFR BLDMV: 0.3 % — SIGNIFICANT CHANGE UP (ref 0–1.5)
METHGB MFR BLDMV: 0.4 % — SIGNIFICANT CHANGE UP (ref 0–1.5)
MI2 AB SER QL: NEGATIVE — SIGNIFICANT CHANGE UP
MONOCYTES # BLD AUTO: 0.08 K/UL — SIGNIFICANT CHANGE UP (ref 0–0.9)
MONOCYTES # BLD AUTO: 0.09 K/UL — SIGNIFICANT CHANGE UP (ref 0–0.9)
MONOCYTES # BLD AUTO: 0.12 K/UL — SIGNIFICANT CHANGE UP (ref 0–0.9)
MONOCYTES # BLD AUTO: 0.13 K/UL — SIGNIFICANT CHANGE UP (ref 0–0.9)
MONOCYTES # BLD AUTO: 0.16 K/UL — SIGNIFICANT CHANGE UP (ref 0–0.9)
MONOCYTES # BLD AUTO: 0.22 K/UL — SIGNIFICANT CHANGE UP (ref 0–0.9)
MONOCYTES NFR BLD AUTO: 29.7 % — HIGH (ref 2–14)
MONOCYTES NFR BLD AUTO: 31 % — HIGH (ref 2–14)
MONOCYTES NFR BLD AUTO: 37.1 % — HIGH (ref 2–14)
MONOCYTES NFR BLD AUTO: 38.1 % — HIGH (ref 2–14)
MONOCYTES NFR BLD AUTO: 44.4 % — HIGH (ref 2–14)
MONOCYTES NFR BLD AUTO: 44.4 % — HIGH (ref 2–14)
MRSA PCR RESULT.: SIGNIFICANT CHANGE UP
NEUTROPHILS # BLD AUTO: 0.01 K/UL — LOW (ref 1.8–7.4)
NEUTROPHILS # BLD AUTO: 0.02 K/UL — LOW (ref 1.8–7.4)
NEUTROPHILS # BLD AUTO: 0.03 K/UL — LOW (ref 1.8–7.4)
NEUTROPHILS # BLD AUTO: 0.11 K/UL — LOW (ref 1.8–7.4)
NEUTROPHILS # BLD AUTO: 0.13 K/UL — LOW (ref 1.8–7.4)
NEUTROPHILS # BLD AUTO: 0.32 K/UL — LOW (ref 1.8–7.4)
NEUTROPHILS NFR BLD AUTO: 10.4 % — LOW (ref 43–77)
NEUTROPHILS NFR BLD AUTO: 3.8 % — LOW (ref 43–77)
NEUTROPHILS NFR BLD AUTO: 30.6 % — LOW (ref 43–77)
NEUTROPHILS NFR BLD AUTO: 37.2 % — LOW (ref 43–77)
NEUTROPHILS NFR BLD AUTO: 43.3 % — SIGNIFICANT CHANGE UP (ref 43–77)
NEUTROPHILS NFR BLD AUTO: 9.5 % — LOW (ref 43–77)
NON-GYNECOLOGICAL CYTOLOGY STUDY: SIGNIFICANT CHANGE UP
NRBC # BLD: 20 /100 WBCS — HIGH (ref 0–0)
NRBC # BLD: 28 /100 WBCS — HIGH (ref 0–0)
NRBC # BLD: 29 /100 WBCS — HIGH (ref 0–0)
NRBC # BLD: 33 /100 WBCS — HIGH (ref 0–0)
NRBC # BLD: 34 /100 WBCS — HIGH (ref 0–0)
NRBC # BLD: 38 /100 WBCS — HIGH (ref 0–0)
NRBC # FLD: 0.08 K/UL — HIGH (ref 0–0)
NRBC # FLD: 0.09 K/UL — HIGH (ref 0–0)
NRBC # FLD: 0.1 K/UL — HIGH (ref 0–0)
NRBC # FLD: 0.15 K/UL — HIGH (ref 0–0)
NXP-2 (P140): <20 UNITS — SIGNIFICANT CHANGE UP
OJ AB SER QL: NEGATIVE — SIGNIFICANT CHANGE UP
OXYGEN SATURATION, PRE MEMBRANE VENOUS: 73.3 % — SIGNIFICANT CHANGE UP (ref 60–85)
OXYGEN SATURATION, PRE MEMBRANE VENOUS: 86.2 % — HIGH (ref 60–85)
OXYHEMOGLOBIN, PRE MEMBRANE VENOUS: 71.8 % — SIGNIFICANT CHANGE UP (ref 59–84)
OXYHEMOGLOBIN, PRE MEMBRANE VENOUS: 84.3 % — HIGH (ref 59–84)
PCO2, PRE MEMBRANE VENOUS: 46 MMHG — HIGH (ref 39–42)
PCO2, PRE MEMBRANE VENOUS: 51 MMHG — HIGH (ref 39–42)
PH, PRE MEMBRANE VENOUS: 7.48 — HIGH (ref 7.32–7.43)
PH, PRE MEMBRANE VENOUS: 7.49 — HIGH (ref 7.32–7.43)
PHOSPHATE SERPL-MCNC: 2 MG/DL — LOW (ref 2.5–4.5)
PHOSPHATE SERPL-MCNC: 2.3 MG/DL — LOW (ref 2.5–4.5)
PHOSPHATE SERPL-MCNC: 2.9 MG/DL — SIGNIFICANT CHANGE UP (ref 2.5–4.5)
PHOSPHATE SERPL-MCNC: 3.6 MG/DL — SIGNIFICANT CHANGE UP (ref 2.5–4.5)
PL12 AB SER QL: NEGATIVE — SIGNIFICANT CHANGE UP
PL7 AB SER QL: NEGATIVE — SIGNIFICANT CHANGE UP
PLATELET # BLD AUTO: 25 K/UL — LOW (ref 150–400)
PLATELET # BLD AUTO: 27 K/UL — LOW (ref 150–400)
PLATELET # BLD AUTO: 30 K/UL — LOW (ref 150–400)
PLATELET # BLD AUTO: 34 K/UL — LOW (ref 150–400)
PLATELET # BLD AUTO: 38 K/UL — LOW (ref 150–400)
PLATELET # BLD AUTO: 38 K/UL — LOW (ref 150–400)
PO2, PRE MEMBRANE VENOUS: 44 MMHG — HIGH (ref 35–40)
PO2, PRE MEMBRANE VENOUS: 53 MMHG — HIGH (ref 35–40)
POTASSIUM SERPL-MCNC: 3.6 MMOL/L — SIGNIFICANT CHANGE UP (ref 3.5–5.3)
POTASSIUM SERPL-MCNC: 3.9 MMOL/L — SIGNIFICANT CHANGE UP (ref 3.5–5.3)
POTASSIUM SERPL-MCNC: 4.5 MMOL/L — SIGNIFICANT CHANGE UP (ref 3.5–5.3)
POTASSIUM SERPL-MCNC: 5.1 MMOL/L — SIGNIFICANT CHANGE UP (ref 3.5–5.3)
POTASSIUM SERPL-SCNC: 3.6 MMOL/L — SIGNIFICANT CHANGE UP (ref 3.5–5.3)
POTASSIUM SERPL-SCNC: 3.9 MMOL/L — SIGNIFICANT CHANGE UP (ref 3.5–5.3)
POTASSIUM SERPL-SCNC: 4.5 MMOL/L — SIGNIFICANT CHANGE UP (ref 3.5–5.3)
POTASSIUM SERPL-SCNC: 5.1 MMOL/L — SIGNIFICANT CHANGE UP (ref 3.5–5.3)
PROT SERPL-MCNC: 3.8 G/DL — LOW (ref 6–8.3)
PROT SERPL-MCNC: 4.5 G/DL — LOW (ref 6–8.3)
PROT SERPL-MCNC: 4.6 G/DL — LOW (ref 6–8.3)
PROT SERPL-MCNC: 5.3 G/DL — LOW (ref 6–8.3)
PROTHROM AB SERPL-ACNC: 16 SEC — HIGH (ref 9.5–13)
PROTHROM AB SERPL-ACNC: 16.7 SEC — HIGH (ref 9.5–13)
PROTHROM AB SERPL-ACNC: 17.2 SEC — HIGH (ref 9.5–13)
QUANT TB PLUS MITOGEN MINUS NIL: 0.01 IU/ML — SIGNIFICANT CHANGE UP
RBC # BLD: 3.57 M/UL — LOW (ref 3.8–5.2)
RBC # BLD: 3.63 M/UL — LOW (ref 3.8–5.2)
RBC # BLD: 3.84 M/UL — SIGNIFICANT CHANGE UP (ref 3.8–5.2)
RBC # BLD: 3.86 M/UL — SIGNIFICANT CHANGE UP (ref 3.8–5.2)
RBC # BLD: 3.88 M/UL — SIGNIFICANT CHANGE UP (ref 3.8–5.2)
RBC # BLD: 3.96 M/UL — SIGNIFICANT CHANGE UP (ref 3.8–5.2)
RBC # FLD: 16 % — HIGH (ref 10.3–14.5)
RBC # FLD: 16.1 % — HIGH (ref 10.3–14.5)
RBC # FLD: 16.2 % — HIGH (ref 10.3–14.5)
RBC # FLD: 16.4 % — HIGH (ref 10.3–14.5)
RBC # FLD: 16.5 % — HIGH (ref 10.3–14.5)
RBC # FLD: 16.7 % — HIGH (ref 10.3–14.5)
S AUREUS DNA NOSE QL NAA+PROBE: SIGNIFICANT CHANGE UP
SODIUM SERPL-SCNC: 142 MMOL/L — SIGNIFICANT CHANGE UP (ref 135–145)
SODIUM SERPL-SCNC: 145 MMOL/L — SIGNIFICANT CHANGE UP (ref 135–145)
SPECIMEN SOURCE: SIGNIFICANT CHANGE UP
SRP AB SERPL QL: NEGATIVE — SIGNIFICANT CHANGE UP
TIF GAMMA (P155/140): <20 UNITS — SIGNIFICANT CHANGE UP
TOTAL HEMOGLOBIN, PRE MEMBRANE VENOUS: 10.5 G/DL — LOW (ref 11.5–15.5)
TOTAL HEMOGLOBIN, PRE MEMBRANE VENOUS: 12.3 G/DL — SIGNIFICANT CHANGE UP (ref 11.5–15.5)
TRIGL SERPL-MCNC: 112 MG/DL — SIGNIFICANT CHANGE UP
U2 SNRNP AB SER QL: ABNORMAL
WBC # BLD: 0.21 K/UL — CRITICAL LOW (ref 3.8–10.5)
WBC # BLD: 0.27 K/UL — CRITICAL LOW (ref 3.8–10.5)
WBC # BLD: 0.29 K/UL — CRITICAL LOW (ref 3.8–10.5)
WBC # BLD: 0.35 K/UL — CRITICAL LOW (ref 3.8–10.5)
WBC # BLD: 0.42 K/UL — CRITICAL LOW (ref 3.8–10.5)
WBC # BLD: 0.74 K/UL — CRITICAL LOW (ref 3.8–10.5)
WBC # FLD AUTO: 0.21 K/UL — CRITICAL LOW (ref 3.8–10.5)
WBC # FLD AUTO: 0.27 K/UL — CRITICAL LOW (ref 3.8–10.5)
WBC # FLD AUTO: 0.29 K/UL — CRITICAL LOW (ref 3.8–10.5)
WBC # FLD AUTO: 0.35 K/UL — CRITICAL LOW (ref 3.8–10.5)
WBC # FLD AUTO: 0.42 K/UL — CRITICAL LOW (ref 3.8–10.5)
WBC # FLD AUTO: 0.74 K/UL — CRITICAL LOW (ref 3.8–10.5)

## 2023-09-19 PROCEDURE — 74018 RADEX ABDOMEN 1 VIEW: CPT | Mod: 26

## 2023-09-19 PROCEDURE — 99233 SBSQ HOSP IP/OBS HIGH 50: CPT

## 2023-09-19 PROCEDURE — 93306 TTE W/DOPPLER COMPLETE: CPT | Mod: 26

## 2023-09-19 PROCEDURE — 33948 ECMO/ECLS DAILY MGMT-VENOUS: CPT

## 2023-09-19 PROCEDURE — 71045 X-RAY EXAM CHEST 1 VIEW: CPT | Mod: 26

## 2023-09-19 PROCEDURE — 99291 CRITICAL CARE FIRST HOUR: CPT

## 2023-09-19 RX ORDER — POTASSIUM PHOSPHATE, MONOBASIC POTASSIUM PHOSPHATE, DIBASIC 236; 224 MG/ML; MG/ML
15 INJECTION, SOLUTION INTRAVENOUS ONCE
Refills: 0 | Status: COMPLETED | OUTPATIENT
Start: 2023-09-19 | End: 2023-09-19

## 2023-09-19 RX ORDER — ALBUMIN HUMAN 25 %
250 VIAL (ML) INTRAVENOUS ONCE
Refills: 0 | Status: COMPLETED | OUTPATIENT
Start: 2023-09-19 | End: 2023-09-19

## 2023-09-19 RX ORDER — HUMAN INSULIN 100 [IU]/ML
11 INJECTION, SUSPENSION SUBCUTANEOUS EVERY 6 HOURS
Refills: 0 | Status: DISCONTINUED | OUTPATIENT
Start: 2023-09-19 | End: 2023-09-24

## 2023-09-19 RX ORDER — DEXTROSE 50 % IN WATER 50 %
25 SYRINGE (ML) INTRAVENOUS ONCE
Refills: 0 | Status: DISCONTINUED | OUTPATIENT
Start: 2023-09-19 | End: 2023-10-05

## 2023-09-19 RX ORDER — CALCIUM GLUCONATE 100 MG/ML
1 VIAL (ML) INTRAVENOUS ONCE
Refills: 0 | Status: DISCONTINUED | OUTPATIENT
Start: 2023-09-19 | End: 2023-09-19

## 2023-09-19 RX ORDER — METOPROLOL TARTRATE 50 MG
5 TABLET ORAL ONCE
Refills: 0 | Status: COMPLETED | OUTPATIENT
Start: 2023-09-19 | End: 2023-09-19

## 2023-09-19 RX ORDER — METOPROLOL TARTRATE 50 MG
25 TABLET ORAL
Refills: 0 | Status: DISCONTINUED | OUTPATIENT
Start: 2023-09-19 | End: 2023-09-20

## 2023-09-19 RX ORDER — MIDAZOLAM HYDROCHLORIDE 1 MG/ML
2 INJECTION, SOLUTION INTRAMUSCULAR; INTRAVENOUS ONCE
Refills: 0 | Status: DISCONTINUED | OUTPATIENT
Start: 2023-09-19 | End: 2023-09-19

## 2023-09-19 RX ORDER — POTASSIUM PHOSPHATE, MONOBASIC POTASSIUM PHOSPHATE, DIBASIC 236; 224 MG/ML; MG/ML
30 INJECTION, SOLUTION INTRAVENOUS ONCE
Refills: 0 | Status: DISCONTINUED | OUTPATIENT
Start: 2023-09-19 | End: 2023-09-19

## 2023-09-19 RX ORDER — PHENYLEPHRINE HYDROCHLORIDE 10 MG/ML
1 INJECTION INTRAVENOUS
Qty: 160 | Refills: 0 | Status: DISCONTINUED | OUTPATIENT
Start: 2023-09-19 | End: 2023-09-20

## 2023-09-19 RX ORDER — DEXTROSE 50 % IN WATER 50 %
12.5 SYRINGE (ML) INTRAVENOUS ONCE
Refills: 0 | Status: DISCONTINUED | OUTPATIENT
Start: 2023-09-19 | End: 2023-10-05

## 2023-09-19 RX ORDER — SODIUM CHLORIDE 9 MG/ML
1000 INJECTION, SOLUTION INTRAVENOUS
Refills: 0 | Status: DISCONTINUED | OUTPATIENT
Start: 2023-09-19 | End: 2023-10-05

## 2023-09-19 RX ORDER — INSULIN LISPRO 100/ML
VIAL (ML) SUBCUTANEOUS EVERY 6 HOURS
Refills: 0 | Status: DISCONTINUED | OUTPATIENT
Start: 2023-09-19 | End: 2023-09-27

## 2023-09-19 RX ADMIN — HUMAN INSULIN 11 UNIT(S): 100 INJECTION, SUSPENSION SUBCUTANEOUS at 18:10

## 2023-09-19 RX ADMIN — Medication 5 MILLIGRAM(S): at 10:44

## 2023-09-19 RX ADMIN — CHLORHEXIDINE GLUCONATE 15 MILLILITER(S): 213 SOLUTION TOPICAL at 05:29

## 2023-09-19 RX ADMIN — MEROPENEM 100 MILLIGRAM(S): 1 INJECTION INTRAVENOUS at 05:29

## 2023-09-19 RX ADMIN — CHLORHEXIDINE GLUCONATE 15 MILLILITER(S): 213 SOLUTION TOPICAL at 17:35

## 2023-09-19 RX ADMIN — Medication 1 DROP(S): at 05:30

## 2023-09-19 RX ADMIN — MIDAZOLAM HYDROCHLORIDE 2 MILLIGRAM(S): 1 INJECTION, SOLUTION INTRAMUSCULAR; INTRAVENOUS at 23:15

## 2023-09-19 RX ADMIN — Medication 3 MILLILITER(S): at 21:48

## 2023-09-19 RX ADMIN — Medication 1: at 18:08

## 2023-09-19 RX ADMIN — POTASSIUM PHOSPHATE, MONOBASIC POTASSIUM PHOSPHATE, DIBASIC 62.5 MILLIMOLE(S): 236; 224 INJECTION, SOLUTION INTRAVENOUS at 11:00

## 2023-09-19 RX ADMIN — Medication 3 MILLILITER(S): at 09:50

## 2023-09-19 RX ADMIN — MEROPENEM 100 MILLIGRAM(S): 1 INJECTION INTRAVENOUS at 22:37

## 2023-09-19 RX ADMIN — Medication 3 MILLILITER(S): at 02:52

## 2023-09-19 RX ADMIN — Medication 125 MILLIGRAM(S): at 05:29

## 2023-09-19 RX ADMIN — HUMAN INSULIN 11 UNIT(S): 100 INJECTION, SUSPENSION SUBCUTANEOUS at 12:11

## 2023-09-19 RX ADMIN — Medication 125 MILLILITER(S): at 23:40

## 2023-09-19 RX ADMIN — SENNA PLUS 10 MILLILITER(S): 8.6 TABLET ORAL at 17:35

## 2023-09-19 RX ADMIN — Medication 1 MILLIGRAM(S): at 11:09

## 2023-09-19 RX ADMIN — Medication 250 MILLIGRAM(S): at 06:39

## 2023-09-19 RX ADMIN — Medication 1 DROP(S): at 17:35

## 2023-09-19 RX ADMIN — ARGATROBAN 1.38 MICROGRAM(S)/KG/MIN: 50 INJECTION, SOLUTION INTRAVENOUS at 10:47

## 2023-09-19 RX ADMIN — MEROPENEM 100 MILLIGRAM(S): 1 INJECTION INTRAVENOUS at 13:00

## 2023-09-19 RX ADMIN — DEXMEDETOMIDINE HYDROCHLORIDE IN 0.9% SODIUM CHLORIDE 6.38 MICROGRAM(S)/KG/HR: 4 INJECTION INTRAVENOUS at 19:20

## 2023-09-19 RX ADMIN — Medication 480 MICROGRAM(S): at 12:59

## 2023-09-19 RX ADMIN — NALOXEGOL OXALATE 25 MILLIGRAM(S): 12.5 TABLET, FILM COATED ORAL at 11:09

## 2023-09-19 RX ADMIN — Medication 25 MILLIGRAM(S): at 13:31

## 2023-09-19 RX ADMIN — ARGATROBAN 1.38 MICROGRAM(S)/KG/MIN: 50 INJECTION, SOLUTION INTRAVENOUS at 19:20

## 2023-09-19 RX ADMIN — Medication 15 MILLILITER(S): at 11:09

## 2023-09-19 RX ADMIN — PANTOPRAZOLE SODIUM 40 MILLIGRAM(S): 20 TABLET, DELAYED RELEASE ORAL at 11:09

## 2023-09-19 RX ADMIN — ATOVAQUONE 1500 MILLIGRAM(S): 750 SUSPENSION ORAL at 11:10

## 2023-09-19 RX ADMIN — CHLORHEXIDINE GLUCONATE 1 APPLICATION(S): 213 SOLUTION TOPICAL at 05:29

## 2023-09-19 RX ADMIN — Medication 3 MILLILITER(S): at 15:41

## 2023-09-19 RX ADMIN — DEXMEDETOMIDINE HYDROCHLORIDE IN 0.9% SODIUM CHLORIDE 6.38 MICROGRAM(S)/KG/HR: 4 INJECTION INTRAVENOUS at 10:47

## 2023-09-19 RX ADMIN — INSULIN HUMAN 1.5 UNIT(S)/HR: 100 INJECTION, SOLUTION SUBCUTANEOUS at 10:47

## 2023-09-19 NOTE — PROGRESS NOTE ADULT - SUBJECTIVE AND OBJECTIVE BOX
Interval Events:  -remains off dilaudid     HPI:  62 year old female with a past medical history of afib, and sciatica, who presented to [facility] for shortness of breath. She had gone to [facility] where she had CXR which showed bilateral lower lobe infiltrates so was sent to ER. She had been having exertional dyspnea and hypoxemia (on home pulse ox to 82%). She had been having dyspnea for several months and had a workup in [place] with a CT scan (which was negative for PE). She also states that she was seen by a pulmonologist and rheumatology, where they ruled out lupus and other immunological disorders.    [facility] Hospital Course  Found to be hypoxic and have interstitial lung disease appearance on CT, admitted 8/26 for AHRF, further ILD workup and management. TTE 8/26 with normal LVEF. Pt was started on prednisone on admission with gradually improving O2 requirement and has been stable on 2-3LNC since 9/3. Started steroid taper on 9/6 and fully transitioned to PO 9/8 overnight. Started on Mycophenolate mofetil (cellcept) 9/8 evening but pt reported subjective side effects with weakness. Episode of AF w RVR with HR up to 140s on 9/11 am, decreased to HR 10-110s with IV lopressor 10. CTA negative for PE and showed interval improvements in GGO but possible lingular bleb and RML bronchiectasis. Patient received 2L of but before more fluids and beta-blocker pt developed heart palpitations with EKG HR 170s with SVT. BPs 105/70s. Did not respond to vagal maneuvers. Pt given oral lopressor 12.5 and IV lopressor 5, with improvement of HR to 130s. SBP briefly dropped to 60-70s then recovered. Patient on NRB offered HFNC/BiPAP but refused. Started on empiric vancomycin and zosyn. BCx w/ NGTD. Per pulm increased solumedrol to 40mg qd. Patient acceded to HFNC in which she desatted and presented with increased wob for which she was transitioned to BiPAP. Patient with anxiety while on BiPAP presenting tachypnea and desatting to the 80s despite verbal redirection for which she was administered ativan 1mg IVP x1. Patient distress improved with sats in the low 90s but had desaturation to 70s. STAT CXR showed increased pulmonary congestion for which patient was administered lasix without improvement. Intubated and started on mechanical ventilation but required very high PEEP (18) and 100% FiO2 and still had low saturations. VV ECMO team consulted and accepted to [facility]. Per signout patient had RV enlargement and dysfunction on POCUS with concern for either new PE vs high pulmonary pressures due to PEEP 18 and lung dysfunction. Patient went for cannulation at [facility] with flouro showing no acute PE. Cannulated with 25 F drainage cannula in R Fem V and 23F “arterial” cannula in RIJ.    (13 Sep 2023 15:24)      REVIEW OF SYSTEMS:    [ ] Unable to assess ROS because ________    OBJECTIVE:  ICU Vital Signs Last 24 Hrs  T(C): 35.8 (19 Sep 2023 04:00), Max: 36 (18 Sep 2023 20:00)  T(F): 96.4 (19 Sep 2023 04:00), Max: 96.8 (18 Sep 2023 20:00)  HR: 77 (19 Sep 2023 05:00) (62 - 84)  BP: --  BP(mean): --  ABP: 112/63 (19 Sep 2023 05:00) (78/44 - 148/80)  ABP(mean): 84 (19 Sep 2023 05:00) (57 - 243)  RR: 12 (19 Sep 2023 05:00) (12 - 19)  SpO2: 98% (19 Sep 2023 05:00) (95% - 100%)    O2 Parameters below as of 19 Sep 2023 06:00  Patient On (Oxygen Delivery Method): ventilator          Mode: AC/ CMV (Assist Control/ Continuous Mandatory Ventilation), RR (machine): 12, FiO2: 40, PEEP: 12, ITime: 1, MAP: 16, PC: 20, PIP: 32    09-17 @ 07:01  -  09-18 @ 07:00  --------------------------------------------------------  IN: 5274.8 mL / OUT: 5435 mL / NET: -160.2 mL    09-18 @ 07:01  -  09-19 @ 05:59  --------------------------------------------------------  IN: 3740.4 mL / OUT: 3135 mL / NET: 605.4 mL      CAPILLARY BLOOD GLUCOSE      POCT Blood Glucose.: 140 mg/dL (19 Sep 2023 05:12)      Physical Exam:   Constitutional: ill appearing, no acute distress   HEENT: + PERRLA, EOMI, no drainage or redness  Neck: supple,  No JVD  Respiratory: Ventilator assisted breath Sounds equal & clear bilaterally to auscultation, no accessory muscle use noted  Cardiovascular: Regular rate, regular rhythm, normal S1, S2; no murmurs or rub  Gastrointestinal: Soft, non-tender, non distended, no hepatosplenomegaly, normal bowel sounds  Extremities: + 2 peripheral edema, no cyanosis, no clubbing   Vascular: Equal and normal pulses: 2+ peripheral pulses throughout  Neurological: sedated/intubated  Psychiatric: calm, normal mood, normal affect  Musculoskeletal: No joint swelling or deformity; no limitation of movement  Skin: warm, dry, well perfused, no rashes    HOSPITAL MEDICATIONS:  Standing Meds:  albuterol/ipratropium for Nebulization 3 milliLiter(s) Nebulizer every 6 hours  argatroban Infusion 0.15 MICROgram(s)/kG/Min IV Continuous <Continuous>  artificial  tears Solution 1 Drop(s) Both EYES every 12 hours  atovaquone  Suspension 1500 milliGRAM(s) Oral daily  chlorhexidine 0.12% Liquid 15 milliLiter(s) Oral Mucosa every 12 hours  chlorhexidine 2% Cloths 1 Application(s) Topical <User Schedule>  dexMEDEtomidine Infusion 0.2 MICROgram(s)/kG/Hr IV Continuous <Continuous>  dextrose 5%. 1000 milliLiter(s) IV Continuous <Continuous>  dextrose 5%. 1000 milliLiter(s) IV Continuous <Continuous>  dextrose 50% Injectable 12.5 Gram(s) IV Push once  dextrose 50% Injectable 25 Gram(s) IV Push once  dextrose 50% Injectable 25 Gram(s) IV Push once  filgrastim-sndz (ZARXIO) Injectable 480 MICROGram(s) SubCutaneous daily  folic acid 1 milliGRAM(s) Oral daily  glucagon  Injectable 1 milliGRAM(s) IntraMuscular once  insulin regular Infusion 2.8 Unit(s)/Hr IV Continuous <Continuous>  meropenem  IVPB      meropenem  IVPB 1000 milliGRAM(s) IV Intermittent every 8 hours  methylPREDNISolone sodium succinate Injectable 125 milliGRAM(s) IV Push daily  multivitamin/minerals/iron Oral Solution (CENTRUM) 15 milliLiter(s) Oral daily  naloxegol 25 milliGRAM(s) Oral daily  norepinephrine Infusion 0.3 MICROgram(s)/kG/Min IV Continuous <Continuous>  pantoprazole  Injectable 40 milliGRAM(s) IV Push daily  polyethylene glycol 3350 17 Gram(s) Oral <User Schedule>  senna Syrup 10 milliLiter(s) Oral two times a day  vancomycin  IVPB      vancomycin  IVPB 1750 milliGRAM(s) IV Intermittent every 12 hours      PRN Meds:  dextrose Oral Gel 15 Gram(s) Oral once PRN  diphenhydrAMINE Injectable 50 milliGRAM(s) IV Push once PRN  meperidine     Injectable 25 milliGRAM(s) IV Push once PRN      LABS:                        11.1   0.29  )-----------( 25       ( 19 Sep 2023 04:05 )             33.9     Hgb Trend: 11.1<--, 10.9<--, 10.9<--, 11.3<--, 11.1<--  09-19    145  |  102  |  50<H>  ----------------------------<  197<H>  3.9   |  33<H>  |  0.40<L>    Ca    9.0      19 Sep 2023 04:05  Phos  2.9     09-18  Mg     2.70     09-18    TPro  4.6<L>  /  Alb  3.1<L>  /  TBili  10.2<H>  /  DBili  x   /  AST  123<H>  /  ALT  360<H>  /  AlkPhos  182<H>  09-19    Creatinine Trend: 0.40<--, 0.44<--, 0.45<--, 0.47<--, 0.49<--, 0.52<--  PT/INR - ( 19 Sep 2023 04:05 )   PT: 16.0 sec;   INR: 1.43 ratio         PTT - ( 19 Sep 2023 04:05 )  PTT:46.6 sec  Urinalysis Basic - ( 19 Sep 2023 04:05 )    Color: x / Appearance: x / SG: x / pH: x  Gluc: 197 mg/dL / Ketone: x  / Bili: x / Urobili: x   Blood: x / Protein: x / Nitrite: x   Leuk Esterase: x / RBC: x / WBC x   Sq Epi: x / Non Sq Epi: x / Bacteria: x      Arterial Blood Gas:  09-19 @ 04:05  7.51/47/164/38/98.6/12.9  ABG lactate: --  Arterial Blood Gas:  09-18 @ 23:55  7.52/47/142/38/99.3/13.9  ABG lactate: --  Arterial Blood Gas:  09-18 @ 18:00  7.46/54/165/38/99.5/12.7  ABG lactate: --  Arterial Blood Gas:  09-18 @ 12:35  7.47/49/144/36/99.1/10.6  ABG lactate: --  Arterial Blood Gas:  09-18 @ 08:00  7.46/53/89/38/97.1/12.1  ABG lactate: --  Arterial Blood Gas:  09-18 @ 04:00  7.41/58/104/37/98.8/10.2  ABG lactate: --  Arterial Blood Gas:  09-17 @ 22:00  7.43/58/102/38/98.8/11.9  ABG lactate: --  Arterial Blood Gas:  09-17 @ 14:45  7.42/59/106/38/98.3/11.7  ABG lactate: --  Arterial Blood Gas:  09-17 @ 08:00  7.40/60/143/37/98.8/10.3  ABG lactate: --        MICROBIOLOGY:     Culture - Blood (collected 16 Sep 2023 10:30)  Source: .Blood Blood-Peripheral  Preliminary Report (18 Sep 2023 15:01):    No growth at 48 Hours    Culture - Blood (collected 16 Sep 2023 10:25)  Source: .Blood Blood-Peripheral  Preliminary Report (18 Sep 2023 15:01):    No growth at 48 Hours    Culture - Sputum (collected 16 Sep 2023 09:00)  Source: ET Tube ET Tube  Gram Stain (16 Sep 2023 16:01):    No polymorphonuclear cells seen per low power field    No Squamous epithelial cells per low power field    No organisms seen  Final Report (18 Sep 2023 08:24):    Normal Respiratory Noreen present      RADIOLOGY:  [ ] Reviewed and interpreted by me       Interval Events:  -remains off dilaudid   -off norepinephrine  -alkalotic this AM in the setting of higher tidal volume on ventilator      HPI:  62 year old female with a past medical history of afib, and sciatica, who presented to [facility] for shortness of breath. She had gone to [facility] where she had CXR which showed bilateral lower lobe infiltrates so was sent to ER. She had been having exertional dyspnea and hypoxemia (on home pulse ox to 82%). She had been having dyspnea for several months and had a workup in [place] with a CT scan (which was negative for PE). She also states that she was seen by a pulmonologist and rheumatology, where they ruled out lupus and other immunological disorders.    [facility] Hospital Course  Found to be hypoxic and have interstitial lung disease appearance on CT, admitted 8/26 for AHRF, further ILD workup and management. TTE 8/26 with normal LVEF. Pt was started on prednisone on admission with gradually improving O2 requirement and has been stable on 2-3LNC since 9/3. Started steroid taper on 9/6 and fully transitioned to PO 9/8 overnight. Started on Mycophenolate mofetil (cellcept) 9/8 evening but pt reported subjective side effects with weakness. Episode of AF w RVR with HR up to 140s on 9/11 am, decreased to HR 10-110s with IV lopressor 10. CTA negative for PE and showed interval improvements in GGO but possible lingular bleb and RML bronchiectasis. Patient received 2L of but before more fluids and beta-blocker pt developed heart palpitations with EKG HR 170s with SVT. BPs 105/70s. Did not respond to vagal maneuvers. Pt given oral lopressor 12.5 and IV lopressor 5, with improvement of HR to 130s. SBP briefly dropped to 60-70s then recovered. Patient on NRB offered HFNC/BiPAP but refused. Started on empiric vancomycin and zosyn. BCx w/ NGTD. Per pulm increased solumedrol to 40mg qd. Patient acceded to HFNC in which she desatted and presented with increased wob for which she was transitioned to BiPAP. Patient with anxiety while on BiPAP presenting tachypnea and desatting to the 80s despite verbal redirection for which she was administered ativan 1mg IVP x1. Patient distress improved with sats in the low 90s but had desaturation to 70s. STAT CXR showed increased pulmonary congestion for which patient was administered lasix without improvement. Intubated and started on mechanical ventilation but required very high PEEP (18) and 100% FiO2 and still had low saturations. VV ECMO team consulted and accepted to [facility] center. Per signout patient had RV enlargement and dysfunction on POCUS with concern for either new PE vs high pulmonary pressures due to PEEP 18 and lung dysfunction. Patient went for cannulation at [facility] with flouro showing no acute PE. Cannulated with 25 F drainage cannula in R Fem V and 23F “arterial” cannula in RIJ.    (13 Sep 2023 15:24)      REVIEW OF SYSTEMS:    [x ] Unable to assess ROS because patient is intubated/can squeeze hand only at this time     OBJECTIVE:  ICU Vital Signs Last 24 Hrs  T(C): 35.8 (19 Sep 2023 04:00), Max: 36 (18 Sep 2023 20:00)  T(F): 96.4 (19 Sep 2023 04:00), Max: 96.8 (18 Sep 2023 20:00)  HR: 77 (19 Sep 2023 05:00) (62 - 84)  BP: --  BP(mean): --  ABP: 112/63 (19 Sep 2023 05:00) (78/44 - 148/80)  ABP(mean): 84 (19 Sep 2023 05:00) (57 - 243)  RR: 12 (19 Sep 2023 05:00) (12 - 19)  SpO2: 98% (19 Sep 2023 05:00) (95% - 100%)    O2 Parameters below as of 19 Sep 2023 06:00  Patient On (Oxygen Delivery Method): ventilator          Mode: AC/ CMV (Assist Control/ Continuous Mandatory Ventilation), RR (machine): 12, FiO2: 40, PEEP: 12, ITime: 1, MAP: 16, PC: 20, PIP: 32    09-17 @ 07:01  -  09-18 @ 07:00  --------------------------------------------------------  IN: 5274.8 mL / OUT: 5435 mL / NET: -160.2 mL    09-18 @ 07:01  -  09-19 @ 05:59  --------------------------------------------------------  IN: 3740.4 mL / OUT: 3135 mL / NET: 605.4 mL      CAPILLARY BLOOD GLUCOSE      POCT Blood Glucose.: 140 mg/dL (19 Sep 2023 05:12)      Physical Exam:   Constitutional: ill appearing, no acute distress   HEENT: + PERRLA, EOMI, no drainage or redness  Neck: supple,  No JVD  Respiratory: Ventilator assisted breath Sounds equal & clear bilaterally to auscultation, no accessory muscle use noted  Cardiovascular: Regular rate, regular rhythm, normal S1, S2; no murmurs or rub  Gastrointestinal: Soft, non-tender, non distended, no hepatosplenomegaly, normal bowel sounds  Extremities: + 2 peripheral edema, no cyanosis, no clubbing   Vascular: Equal and normal pulses: 2+ peripheral pulses throughout  Neurological: sedated/intubated  Psychiatric: calm, normal mood, normal affect  Musculoskeletal: No joint swelling or deformity; no limitation of movement  Skin: warm, dry, well perfused, no rashes    HOSPITAL MEDICATIONS:  Standing Meds:  albuterol/ipratropium for Nebulization 3 milliLiter(s) Nebulizer every 6 hours  argatroban Infusion 0.15 MICROgram(s)/kG/Min IV Continuous <Continuous>  artificial  tears Solution 1 Drop(s) Both EYES every 12 hours  atovaquone  Suspension 1500 milliGRAM(s) Oral daily  chlorhexidine 0.12% Liquid 15 milliLiter(s) Oral Mucosa every 12 hours  chlorhexidine 2% Cloths 1 Application(s) Topical <User Schedule>  dexMEDEtomidine Infusion 0.2 MICROgram(s)/kG/Hr IV Continuous <Continuous>  dextrose 5%. 1000 milliLiter(s) IV Continuous <Continuous>  dextrose 5%. 1000 milliLiter(s) IV Continuous <Continuous>  dextrose 50% Injectable 12.5 Gram(s) IV Push once  dextrose 50% Injectable 25 Gram(s) IV Push once  dextrose 50% Injectable 25 Gram(s) IV Push once  filgrastim-sndz (ZARXIO) Injectable 480 MICROGram(s) SubCutaneous daily  folic acid 1 milliGRAM(s) Oral daily  glucagon  Injectable 1 milliGRAM(s) IntraMuscular once  insulin regular Infusion 2.8 Unit(s)/Hr IV Continuous <Continuous>  meropenem  IVPB      meropenem  IVPB 1000 milliGRAM(s) IV Intermittent every 8 hours  methylPREDNISolone sodium succinate Injectable 125 milliGRAM(s) IV Push daily  multivitamin/minerals/iron Oral Solution (CENTRUM) 15 milliLiter(s) Oral daily  naloxegol 25 milliGRAM(s) Oral daily  norepinephrine Infusion 0.3 MICROgram(s)/kG/Min IV Continuous <Continuous>  pantoprazole  Injectable 40 milliGRAM(s) IV Push daily  polyethylene glycol 3350 17 Gram(s) Oral <User Schedule>  senna Syrup 10 milliLiter(s) Oral two times a day  vancomycin  IVPB      vancomycin  IVPB 1750 milliGRAM(s) IV Intermittent every 12 hours      PRN Meds:  dextrose Oral Gel 15 Gram(s) Oral once PRN  diphenhydrAMINE Injectable 50 milliGRAM(s) IV Push once PRN  meperidine     Injectable 25 milliGRAM(s) IV Push once PRN      LABS:                        11.1   0.29  )-----------( 25       ( 19 Sep 2023 04:05 )             33.9     Hgb Trend: 11.1<--, 10.9<--, 10.9<--, 11.3<--, 11.1<--  09-19    145  |  102  |  50<H>  ----------------------------<  197<H>  3.9   |  33<H>  |  0.40<L>    Ca    9.0      19 Sep 2023 04:05  Phos  2.9     09-18  Mg     2.70     09-18    TPro  4.6<L>  /  Alb  3.1<L>  /  TBili  10.2<H>  /  DBili  x   /  AST  123<H>  /  ALT  360<H>  /  AlkPhos  182<H>  09-19    Creatinine Trend: 0.40<--, 0.44<--, 0.45<--, 0.47<--, 0.49<--, 0.52<--  PT/INR - ( 19 Sep 2023 04:05 )   PT: 16.0 sec;   INR: 1.43 ratio         PTT - ( 19 Sep 2023 04:05 )  PTT:46.6 sec  Urinalysis Basic - ( 19 Sep 2023 04:05 )    Color: x / Appearance: x / SG: x / pH: x  Gluc: 197 mg/dL / Ketone: x  / Bili: x / Urobili: x   Blood: x / Protein: x / Nitrite: x   Leuk Esterase: x / RBC: x / WBC x   Sq Epi: x / Non Sq Epi: x / Bacteria: x      Arterial Blood Gas:  09-19 @ 04:05  7.51/47/164/38/98.6/12.9  ABG lactate: --  Arterial Blood Gas:  09-18 @ 23:55  7.52/47/142/38/99.3/13.9  ABG lactate: --  Arterial Blood Gas:  09-18 @ 18:00  7.46/54/165/38/99.5/12.7  ABG lactate: --  Arterial Blood Gas:  09-18 @ 12:35  7.47/49/144/36/99.1/10.6  ABG lactate: --  Arterial Blood Gas:  09-18 @ 08:00  7.46/53/89/38/97.1/12.1  ABG lactate: --  Arterial Blood Gas:  09-18 @ 04:00  7.41/58/104/37/98.8/10.2  ABG lactate: --  Arterial Blood Gas:  09-17 @ 22:00  7.43/58/102/38/98.8/11.9  ABG lactate: --  Arterial Blood Gas:  09-17 @ 14:45  7.42/59/106/38/98.3/11.7  ABG lactate: --  Arterial Blood Gas:  09-17 @ 08:00  7.40/60/143/37/98.8/10.3  ABG lactate: --        MICROBIOLOGY:     Culture - Blood (collected 16 Sep 2023 10:30)  Source: .Blood Blood-Peripheral  Preliminary Report (18 Sep 2023 15:01):    No growth at 48 Hours    Culture - Blood (collected 16 Sep 2023 10:25)  Source: .Blood Blood-Peripheral  Preliminary Report (18 Sep 2023 15:01):    No growth at 48 Hours    Culture - Sputum (collected 16 Sep 2023 09:00)  Source: ET Tube ET Tube  Gram Stain (16 Sep 2023 16:01):    No polymorphonuclear cells seen per low power field    No Squamous epithelial cells per low power field    No organisms seen  Final Report (18 Sep 2023 08:24):    Normal Respiratory Noreen present      RADIOLOGY:  [ ] Reviewed and interpreted by me       Interval Events:  -remains off dilaudid   -off norepinephrine  -alkalotic this AM in the setting of higher tidal volume on ventilator      HPI:  62 year old female with a past medical history of afib, and sciatica, who presented to [facility] for shortness of breath. She had gone to [facility] where she had CXR which showed bilateral lower lobe infiltrates so was sent to ER. She had been having exertional dyspnea and hypoxemia (on home pulse ox to 82%). She had been having dyspnea for several months and had a workup in [place] with a CT scan (which was negative for PE). She also states that she was seen by a pulmonologist and rheumatology, where they ruled out lupus and other immunological disorders.    [facility] Hospital Course  Found to be hypoxic and have interstitial lung disease appearance on CT, admitted 8/26 for AHRF, further ILD workup and management. TTE 8/26 with normal LVEF. Pt was started on prednisone on admission with gradually improving O2 requirement and has been stable on 2-3LNC since 9/3. Started steroid taper on 9/6 and fully transitioned to PO 9/8 overnight. Started on Mycophenolate mofetil (cellcept) 9/8 evening but pt reported subjective side effects with weakness. Episode of AF w RVR with HR up to 140s on 9/11 am, decreased to HR 10-110s with IV lopressor 10. CTA negative for PE and showed interval improvements in GGO but possible lingular bleb and RML bronchiectasis. Patient received 2L of but before more fluids and beta-blocker pt developed heart palpitations with EKG HR 170s with SVT. BPs 105/70s. Did not respond to vagal maneuvers. Pt given oral lopressor 12.5 and IV lopressor 5, with improvement of HR to 130s. SBP briefly dropped to 60-70s then recovered. Patient on NRB offered HFNC/BiPAP but refused. Started on empiric vancomycin and zosyn. BCx w/ NGTD. Per pulm increased solumedrol to 40mg qd. Patient acceded to HFNC in which she desatted and presented with increased wob for which she was transitioned to BiPAP. Patient with anxiety while on BiPAP presenting tachypnea and desatting to the 80s despite verbal redirection for which she was administered ativan 1mg IVP x1. Patient distress improved with sats in the low 90s but had desaturation to 70s. STAT CXR showed increased pulmonary congestion for which patient was administered lasix without improvement. Intubated and started on mechanical ventilation but required very high PEEP (18) and 100% FiO2 and still had low saturations. VV ECMO team consulted and accepted to [facility] Injury center. Per signout patient had RV enlargement and dysfunction on POCUS with concern for either new PE vs high pulmonary pressures due to PEEP 18 and lung dysfunction. Patient went for cannulation at [facility] with flouro showing no acute PE. Cannulated with 25 F drainage cannula in R Fem V and 23F “arterial” cannula in RIJ.    (13 Sep 2023 15:24)      REVIEW OF SYSTEMS:    [x ] Unable to assess ROS because patient is intubated/can squeeze hand only at this time     OBJECTIVE:  ICU Vital Signs Last 24 Hrs  T(C): 35.8 (19 Sep 2023 04:00), Max: 36 (18 Sep 2023 20:00)  T(F): 96.4 (19 Sep 2023 04:00), Max: 96.8 (18 Sep 2023 20:00)  HR: 77 (19 Sep 2023 05:00) (62 - 84)  BP: --  BP(mean): --  ABP: 112/63 (19 Sep 2023 05:00) (78/44 - 148/80)  ABP(mean): 84 (19 Sep 2023 05:00) (57 - 243)  RR: 12 (19 Sep 2023 05:00) (12 - 19)  SpO2: 98% (19 Sep 2023 05:00) (95% - 100%)    O2 Parameters below as of 19 Sep 2023 06:00  Patient On (Oxygen Delivery Method): ventilator          Mode: AC/ CMV (Assist Control/ Continuous Mandatory Ventilation), RR (machine): 12, FiO2: 40, PEEP: 12, ITime: 1, MAP: 16, PC: 20, PIP: 32    09-17 @ 07:01  -  09-18 @ 07:00  --------------------------------------------------------  IN: 5274.8 mL / OUT: 5435 mL / NET: -160.2 mL    09-18 @ 07:01  -  09-19 @ 05:59  --------------------------------------------------------  IN: 3740.4 mL / OUT: 3135 mL / NET: 605.4 mL      CAPILLARY BLOOD GLUCOSE      POCT Blood Glucose.: 140 mg/dL (19 Sep 2023 05:12)      Physical Exam:   Constitutional: ill appearing, no acute distress   HEENT: + PERRLA, EOMI, no drainage or redness  Neck: Right IJ ecmo cannula and left IJ TLC in place, supple, No JVD  Respiratory: Ventilator assisted breath Sounds dimished bilaterally to auscultation, no accessory muscle use noted  Cardiovascular: regular rate, irregular rhythm, normal S1, S2; no murmurs or rub  Gastrointestinal: obese, soft, non-tender, non distended, no hepatosplenomegaly, normal bowel sounds  Extremities: + 2 peripheral edema, no cyanosis, no clubbing   Vascular: Equal and normal pulses: 2+ peripheral pulses throughout  Neurological: sedated/intubated, moves extremities   Psychiatric: calm, normal mood, normal affect  Musculoskeletal: No joint swelling or deformity; no limitation of movement  Skin: warm, dry, well perfused, no rashes, some ecchymotic areas       HOSPITAL MEDICATIONS:  Standing Meds:  albuterol/ipratropium for Nebulization 3 milliLiter(s) Nebulizer every 6 hours  argatroban Infusion 0.15 MICROgram(s)/kG/Min IV Continuous <Continuous>  artificial  tears Solution 1 Drop(s) Both EYES every 12 hours  atovaquone  Suspension 1500 milliGRAM(s) Oral daily  chlorhexidine 0.12% Liquid 15 milliLiter(s) Oral Mucosa every 12 hours  chlorhexidine 2% Cloths 1 Application(s) Topical <User Schedule>  dexMEDEtomidine Infusion 0.2 MICROgram(s)/kG/Hr IV Continuous <Continuous>  dextrose 5%. 1000 milliLiter(s) IV Continuous <Continuous>  dextrose 5%. 1000 milliLiter(s) IV Continuous <Continuous>  dextrose 50% Injectable 12.5 Gram(s) IV Push once  dextrose 50% Injectable 25 Gram(s) IV Push once  dextrose 50% Injectable 25 Gram(s) IV Push once  filgrastim-sndz (ZARXIO) Injectable 480 MICROGram(s) SubCutaneous daily  folic acid 1 milliGRAM(s) Oral daily  glucagon  Injectable 1 milliGRAM(s) IntraMuscular once  insulin regular Infusion 2.8 Unit(s)/Hr IV Continuous <Continuous>  meropenem  IVPB      meropenem  IVPB 1000 milliGRAM(s) IV Intermittent every 8 hours  methylPREDNISolone sodium succinate Injectable 125 milliGRAM(s) IV Push daily  multivitamin/minerals/iron Oral Solution (CENTRUM) 15 milliLiter(s) Oral daily  naloxegol 25 milliGRAM(s) Oral daily  norepinephrine Infusion 0.3 MICROgram(s)/kG/Min IV Continuous <Continuous>  pantoprazole  Injectable 40 milliGRAM(s) IV Push daily  polyethylene glycol 3350 17 Gram(s) Oral <User Schedule>  senna Syrup 10 milliLiter(s) Oral two times a day  vancomycin  IVPB      vancomycin  IVPB 1750 milliGRAM(s) IV Intermittent every 12 hours      PRN Meds:  dextrose Oral Gel 15 Gram(s) Oral once PRN  diphenhydrAMINE Injectable 50 milliGRAM(s) IV Push once PRN  meperidine     Injectable 25 milliGRAM(s) IV Push once PRN      LABS:                        11.1   0.29  )-----------( 25       ( 19 Sep 2023 04:05 )             33.9     Hgb Trend: 11.1<--, 10.9<--, 10.9<--, 11.3<--, 11.1<--  09-19    145  |  102  |  50<H>  ----------------------------<  197<H>  3.9   |  33<H>  |  0.40<L>    Ca    9.0      19 Sep 2023 04:05  Phos  2.9     09-18  Mg     2.70     09-18    TPro  4.6<L>  /  Alb  3.1<L>  /  TBili  10.2<H>  /  DBili  x   /  AST  123<H>  /  ALT  360<H>  /  AlkPhos  182<H>  09-19    Creatinine Trend: 0.40<--, 0.44<--, 0.45<--, 0.47<--, 0.49<--, 0.52<--  PT/INR - ( 19 Sep 2023 04:05 )   PT: 16.0 sec;   INR: 1.43 ratio         PTT - ( 19 Sep 2023 04:05 )  PTT:46.6 sec  Urinalysis Basic - ( 19 Sep 2023 04:05 )    Color: x / Appearance: x / SG: x / pH: x  Gluc: 197 mg/dL / Ketone: x  / Bili: x / Urobili: x   Blood: x / Protein: x / Nitrite: x   Leuk Esterase: x / RBC: x / WBC x   Sq Epi: x / Non Sq Epi: x / Bacteria: x      Arterial Blood Gas:  09-19 @ 04:05  7.51/47/164/38/98.6/12.9  ABG lactate: --  Arterial Blood Gas:  09-18 @ 23:55  7.52/47/142/38/99.3/13.9  ABG lactate: --  Arterial Blood Gas:  09-18 @ 18:00  7.46/54/165/38/99.5/12.7  ABG lactate: --  Arterial Blood Gas:  09-18 @ 12:35  7.47/49/144/36/99.1/10.6  ABG lactate: --  Arterial Blood Gas:  09-18 @ 08:00  7.46/53/89/38/97.1/12.1  ABG lactate: --  Arterial Blood Gas:  09-18 @ 04:00  7.41/58/104/37/98.8/10.2  ABG lactate: --  Arterial Blood Gas:  09-17 @ 22:00  7.43/58/102/38/98.8/11.9  ABG lactate: --  Arterial Blood Gas:  09-17 @ 14:45  7.42/59/106/38/98.3/11.7  ABG lactate: --  Arterial Blood Gas:  09-17 @ 08:00  7.40/60/143/37/98.8/10.3  ABG lactate: --        MICROBIOLOGY:     Culture - Blood (collected 16 Sep 2023 10:30)  Source: .Blood Blood-Peripheral  Preliminary Report (18 Sep 2023 15:01):    No growth at 48 Hours    Culture - Blood (collected 16 Sep 2023 10:25)  Source: .Blood Blood-Peripheral  Preliminary Report (18 Sep 2023 15:01):    No growth at 48 Hours    Culture - Sputum (collected 16 Sep 2023 09:00)  Source: ET Tube ET Tube  Gram Stain (16 Sep 2023 16:01):    No polymorphonuclear cells seen per low power field    No Squamous epithelial cells per low power field    No organisms seen  Final Report (18 Sep 2023 08:24):    Normal Respiratory Noreen present      RADIOLOGY:  [ ] Reviewed and interpreted by me

## 2023-09-19 NOTE — PROGRESS NOTE ADULT - SUBJECTIVE AND OBJECTIVE BOX
Follow Up:      Interval History/ROS: Patient had no events overnight. Still intubated on ECMO.  Patient currently receiving Vancomycin, meropenem, and atovaquone treatment.    REVIEW OF SYSTEMS  Unable to perform as patient is obtunded    Allergies  No Known Allergies        ANTIMICROBIALS:    atovaquone  Suspension 1500 daily  meropenem  IVPB    meropenem  IVPB 1000 every 8 hours  vancomycin  IVPB    vancomycin  IVPB 1750 every 12 hours      OTHER MEDS: MEDICATIONS  (STANDING):  albuterol/ipratropium for Nebulization 3 every 6 hours  argatroban Infusion 0.18 <Continuous>  dexMEDEtomidine Infusion 0.2 <Continuous>  dextrose 50% Injectable 12.5 once  dextrose 50% Injectable 25 once  dextrose 50% Injectable 25 once  dextrose Oral Gel 15 once PRN  diphenhydrAMINE Injectable 50 once PRN  filgrastim-sndz (ZARXIO) Injectable 480 daily  glucagon  Injectable 1 once  insulin NPH human recombinant 11 every 6 hours  insulin regular Infusion 1.5 <Continuous>  meperidine     Injectable 25 once PRN  methylPREDNISolone sodium succinate Injectable 80 daily  metoprolol tartrate Injectable 5 once  naloxegol 25 daily  norepinephrine Infusion 0.3 <Continuous>  pantoprazole  Injectable 40 daily  polyethylene glycol 3350 17 <User Schedule>  senna Syrup 10 two times a day      Vital Signs Last 24 Hrs  T(F): 96.5 (09-19-23 @ 08:00), Max: 98.7 (09-12-23 @ 13:35)    Vital Signs Last 24 Hrs  HR: 80 (09-19-23 @ 09:20) (62 - 84)  BP: --  RR: 18 (09-19-23 @ 09:20)  SpO2: 97% (09-19-23 @ 09:20) (95% - 100%)  Wt(kg): --    EXAM:  GENERAL: Intubated, sedated  HEAD:  Atraumatic, Normocephalic  EYES:  Scleral icterus  NERVOUS SYSTEM: Sedated  CHEST/LUNG:  Diffuse crackles and ronchi  HEART: Irregular tachycardia; No murmurs, rubs, or gallops  ABDOMEN: Soft, Nontender, Nondistended; Bowel sounds present  EXTREMITIES:  No clubbing, cyanosis  SKIN: No rashes or lesions        Labs:                        11.1   0.29  )-----------( 25       ( 19 Sep 2023 04:05 )             33.9     09-19    145  |  102  |  50<H>  ----------------------------<  197<H>  3.9   |  33<H>  |  0.40<L>    Ca    9.0      19 Sep 2023 04:05  Phos  2.3     09-19  Mg     2.60     09-19    TPro  4.6<L>  /  Alb  3.1<L>  /  TBili  10.2<H>  /  DBili  x   /  AST  123<H>  /  ALT  360<H>  /  AlkPhos  182<H>  09-19      WBC Trend:  WBC Count: 0.29 (09-19-23 @ 04:05)  WBC Count: 0.21 (09-18-23 @ 23:55)  WBC Count: 0.21 (09-18-23 @ 18:00)  WBC Count: 0.20 (09-18-23 @ 12:35)      Creatine Trend:  Creatinine: 0.40 (09-19)  Creatinine: 0.44 (09-18)  Creatinine: 0.45 (09-18)  Creatinine: 0.47 (09-18)      Liver Biochemical Testing Trend:  Alanine Aminotransferase (ALT/SGPT): 360 *H* (09-19)  Alanine Aminotransferase (ALT/SGPT): 355 *H* (09-18)  Alanine Aminotransferase (ALT/SGPT): 363 *H* (09-18)  Alanine Aminotransferase (ALT/SGPT): 738 *H* (09-18)  Alanine Aminotransferase (ALT/SGPT): 760 *H* (09-18)  Aspartate Aminotransferase (AST/SGOT): 123 (09-19-23 @ 04:05)  Aspartate Aminotransferase (AST/SGOT): 122 (09-18-23 @ 23:55)  Aspartate Aminotransferase (AST/SGOT): 139 (09-18-23 @ 18:00)  Aspartate Aminotransferase (AST/SGOT): 222 (09-18-23 @ 12:35)  Aspartate Aminotransferase (AST/SGOT): 240 (09-18-23 @ 08:00)  Bilirubin Total: 10.2 (09-19)  Bilirubin Total: 9.0 (09-18)  Bilirubin Total: 7.7 (09-18)  Bilirubin Total: 10.8 (09-18)  Bilirubin Total: 9.8 (09-18)      Trend LDH  09-19-23 @ 04:05  404<H>  09-18-23 @ 23:55  382<H>  09-18-23 @ 04:00  584<H>  09-17-23 @ 04:00  627<H>  09-16-23 @ 20:23  681<H>      Urinalysis Basic - ( 19 Sep 2023 04:05 )    Color: x / Appearance: x / SG: x / pH: x  Gluc: 197 mg/dL / Ketone: x  / Bili: x / Urobili: x   Blood: x / Protein: x / Nitrite: x   Leuk Esterase: x / RBC: x / WBC x   Sq Epi: x / Non Sq Epi: x / Bacteria: x        MICROBIOLOGY:  Vancomycin Level, Random: 5.5 (09-13 @ 20:26)    MRSA PCR Result.: NotDetec (09-13-23 @ 17:41)  MRSA PCR Result.: Negative (09-12-23 @ 04:12)      Culture - Blood (collected 16 Sep 2023 10:30)  Source: .Blood Blood-Peripheral  Preliminary Report:    No growth at 48 Hours    Culture - Blood (collected 16 Sep 2023 10:25)  Source: .Blood Blood-Peripheral  Preliminary Report:    No growth at 48 Hours    Culture - Sputum (collected 16 Sep 2023 09:00)  Source: ET Tube ET Tube  Final Report:    Normal Respiratory Noreen present    Culture - Blood (collected 13 Sep 2023 17:51)  Source: .Blood Blood-Peripheral  Final Report:    No growth at 5 days    Culture - Blood (collected 13 Sep 2023 17:44)  Source: .Blood Blood  Final Report:    No growth at 5 days    Culture - Urine (collected 13 Sep 2023 17:25)  Source: Catheterized Catheterized  Final Report:    No growth    Culture - Fungal, Bronchial (collected 13 Sep 2023 15:45)  Source: .Bronchial Bronchial Lavage  Preliminary Report:    Culture is being performed. Fungal cultures are held for 4 weeks.    Culture - Acid Fast - Bronchial w/Smear (collected 13 Sep 2023 15:45)  Source: .Bronchial Bronchial Lavage  Preliminary Report:    Culture is being performed.    Culture - Bronchial (collected 13 Sep 2023 15:45)  Source: .Bronchial Bronchial Lavage  Final Report:    No growth    Culture - Blood (collected 12 Sep 2023 03:43)  Source: .Blood Blood  Final Report:    No growth at 5 days.      HIV-1/2 Combo Result: Nonreact (09-17-23 @ 08:00)                    Rapid RVP Result: NotDetec (09-13 @ 17:55)  Legionella Antigen, Urine: Negative (09-13 @ 17:53)      COVID-19 Nucleocapsid ALLIE AB Interp: Negative (08-29-23 @ 14:56)  COVID-19 Jeremy Domain AB Interp: Positive (08-29-23 @ 14:56)      C-Reactive Protein, Serum: 105.7 (09-16)    Ferritin: 274 (09-19)  Ferritin: 3752 (09-14)    D-Dimer Assay, Quantitative: 1058 (09-13)    Lactate Dehydrogenase, Serum: 404 (09-19)  Lactate Dehydrogenase, Serum: 382 (09-18)  Lactate Dehydrogenase, Serum: 584 (09-18)  Lactate Dehydrogenase, Serum: 627 (09-17)  Lactate Dehydrogenase, Serum: 681 (09-16)  Lactate Dehydrogenase, Serum: 775 (09-16)  Lactate Dehydrogenase, Serum: 923 (09-15)  Lactate Dehydrogenase, Serum: 966 (09-14)  Lactate Dehydrogenase, Serum: 1307 (09-13)        Blood Gas Arterial, Lactate: 1.4 (09-19 @ 08:14)  Blood Gas Arterial, Lactate: 2.3 (09-19 @ 04:05)  Blood Gas Arterial, Lactate: 1.7 (09-18 @ 23:55)  Blood Gas Arterial, Lactate: 2.6 (09-18 @ 18:00)      RADIOLOGY:  imaging below personally reviewed

## 2023-09-19 NOTE — PROGRESS NOTE ADULT - ATTENDING COMMENTS
62 year old with progressive dyspnea and hypoxia that has progressed over months.  She has some associated rash and arthralgia.    Suspected Mixed connective tissue disorder/SLE    She had progressive hypoxic respiratory failure  She was intubated  Now on VV ECMO.  Now with neutropenia    I would continue mepron for prophylaxis  Can continue meropenem pending count recovery

## 2023-09-19 NOTE — PROGRESS NOTE ADULT - ASSESSMENT
61 yo F w/ Afib initially presented to urgent care for SOB where she had an XR done concerning for b/l lower infiltrates, sent to [facility] ED and  admitted to [facility] on 8/26 after being found to be hypoxemic, reported having  debilitating b/l hand numbness/tingling and some muscles weakness several months. Found to be hypoxic and have interstitial lung disease appearance on CT, admitted 8/26 for AHRF, further ILD workup and management,  started on prednisone on admission with gradually improving O2 requirement and has been stable on 2-3LNC since 9/3, underwent bronchoscopy with TBBX on 8/30 which showed Non-Specific Intersitial Disease (NSIP)/OP. Labs notable for positive ARIANA 1:1280, RNP > 8, Alejandro > 8. Patient continued on steroids and recieved cellcept, which was discontinued 2/2 Afib-RVR. Interval CTA chest on 9/11 also negative PE did show possible lingula pneumonia.  Started on empiric vancomycin and zosyn 9/12, course was then c/b episode of SVT to 170s w/ rapidly progressive hypoxemic respiratory failure, placed NRB offered HFNC/BiPAP but refused, leading to worsening hypoxemic respiratory failure requiring intubation  9/13. Despite best practices, patient remained hypoxemic and patient was cannulated for VV-ECMO on 9/13 and transferred to [facility].      NEUROLOGY  Home meds: gabapentin 100g TID  AA&Ox3 at baseline   - opened eyes, moved LE and R upper arm spontaneously but did not following commands off sedation  - currently on precedex for comfort remains off dilaudid   - CT 9/14 no acute findings  - Palliative following  - Social Work consult  - PT/OT following    PULMONARY  Admitted to [facility] on 8/26 after p/w SOB, found to be hypoxemic, required 2-4L NC/bibap, initially responded well to IV steroids. 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  - rheum consulted for DAH  - continue with duonebs,  trialed IPV but vomited shortly after initiation, now discontinued,  - c/w empiric abx         ECMO  VV ECMO		  Cannulation sites/dates: 9/13/23 R fem 25F. RIJ 19F   Flow: 3.7		P1: 162  	Delta P: 22  RPM: 2850		P2: 140		SVO2: 82  Sweep: 1 L/min:              FIO2:   100%      - 9/14 right groin cannula pulled-back ~3-4cm to position tip of cannula just inferior to hepatic vein   - clinically perfusing. Lactate: 2.3  - ECMO sweep sighing q1hr  - Adjust circuit flow as tolerated with DO2:VO2 goal >2  - Monitor for hemolysis, chatter, evidence of recirculation   - Plan to call CT surgery PA in the AM to suture right femoral cannula securely now that patient is more alert/awake      CARDIOVASCULAR  Hx of Afib w at OSH, started on Amiodarone gtt now in shock state requiring requiring pressors likely septic with component of vaspoplegia i/s/o sedation, possible contribution of cardiogenic from RV dysfunction   - No PE seen on invasive pulmonary angiography at time of ECMO cannulation or in recent imaging  - NO2 weaned off  (9/15) RV function remains unchanged  - s/p milrinone, off since (9/13)  - no longer requiring pressors to maintain map >65  - Roosevelt off since ( 9/14) and Vaso off since (9/15) Levo off since (9/18)  - converted to sinus (9/14) discontinued amio gtt iso bradycardia   - RITCHIE 9/14 : No MEREDITH thrombus noted, negative for PFO. LVOT diameter of 2 cm  - TTE 9/12 with EF 65-70% with normal LV and RV  - TTE 9/14 with  EF 18%. Severe global LV systolic dysfunction. RV enlargement with decreased RV systolic function, however RITCHIE and recent POCUS shows normal LV systolic function, improved RV systolic function     GASTROINTESTINAL  Transaminase elevation likely 2/2 combination of shock liver, malposition of ECMO cannula and liver dysfunction  - ALK normal 106, AST/ALT downtrending but remain elevated 240/760, T.bili continues to rise 9.8  - Acute hepatitis panel negative  - RUQ US 9/15 shows fatty infiltration of liver, negative for hepatobiliary pathology, will repeat RUQ ultrasound  - ECMO canula repositioned appropriately 9/14  - GI consult placed - f/u recs  - Triglycerides elevated 836, on insulin gtt for hyperglycemia, downtrending 406 > 271 >221 >173  - Episode of vomiting 9/16, tube feeds held and given reglan x48 hours, restarted TF 9/17, tolerating  - BM noted 9/17 and 9/18  - continue bowel regimen senna, miralax and movantik to avoid opioid induced constipation  - CT abdomen 9/15 w/o bowel obstruction, noted with colonic diverticulosis mostly in sigmoid, w/o evidence of diverticulitis  - continue PPI iso steroids   - nutrition following        RENAL  sCr baseline 0.78  - Creatinine wnl, 0.49 today   - s/p diuresis with lasix, last administered 9/15, IVC 1.8cm  - now with hemoconcentrator to assist with fluid removal was removing -50cc/h overnight   - metabolic alkalosis, ?contraction, goal to keep net even given hypernatremia and elevated bicarb  - indwelling catheter in place, can discontinue 9/18 and monitor   - monitor, Replete to K>4, Phos>3, Mg>2, iCa>1  - hypernatremia 150,  Na improving  >148>147> 146, can d/c d5W, and give free h20 250ml q8 instead, monitor bmp q12      INFECTIOUS DISEASE  Leukopenia likely transient? vs drug induced vs infection vs malignancy?  - WBC 0.19, remains afebrile however temperature has been regulated via ECMO circuit, will turn off and monitor for fever  - Bactrim DS discontinued iso leukopenia, continue Mepron for PJP prophylaxis instead  - heme following- thrombocytopenia noted as well as leukopenia, likely drug induced? filgrastim started 9/17  - s/p IV Ceftriaxone 5 days? at [facility] out of an abundance of precaution to cover BAL specimen  - s/p zosyn at OSH (9/12) for lingula PNA  - vancomycin (9/12-9/15 ) d/c'd negative mrsa PCR, and azithro (9/14-9/15) d/c'd neg Urine legionella  - can restart Vancomycin ppx (9/18-  )  iso leukopenia and c/w empiric donald (9/13- ) for 7 day course?   - draw vancomycin level prior to 4th dose 9/20   - 9/13 and 9/16 urine, sputum and blood cx ngtd  - positive COVID-19 antigen in late August  - f/u BAL, cultures, cytopathology, IL2 receptor  - ID consult for leukopenia - f/u recs      ENDOCRINE  # Hyperglycemia i/s/o steroids  Admission A1c 6.1  - hyperglycemia iso steroids, glucose remained elevated despite increase of NPH and ISS  - continue insulin gtt for tighter glycemic control, and   - elevated triglycerides 835, has hx of fatty liver, currently on propofol gtt. TG downtrending since initiating insulin gtt for hyperglycemia, now 173, continue to monitor daily  - TSH low at 0.13/Free T4 slightly low 0.7, will repeat in a few days      HEME  # ECMO anticoagulation  - continue argatroban gtt goal aPTT 50-70  - INR elevated likely iso argatroban and liver dysfxn  - platelets continue to downtrend, refractory after transfusion, 47  -> 51 -> 46 now 25  - s/p Vit. K (9/14) and FFP x1 (9/15), platelets x 4units (9/17, 9/18)  - fibrinogen 250, monitor daily  - Hgb remains stable 11.3  - Transfuse for Hgb <7, Plt<10  - Heme consult      #Leukopenia  #Thrombocytopenia  - Heme consult placed for thrombocytopenia, noted to also be leukopenic  - unlikely HLH/MAS since many abnormalities can be explained by severe hypoxemia/hypotension during initial decompensation prior to ecmo cannulation but some features that are consistent and with rheumatic disease it is a possibility to f/u hematology regarding utility of further lab/pathologic testing  - peripheral blood smear reviewed: large platelets noted, no platelet clumps, no blast cells noted, neutrophils noted w/ normal number of lobes, nucleated RBC noted  - acute hepatitis panel negative  - give filgrastim 480 daily until ANC >1500 in the setting of possible infxn       #R/O DVT  - LE duplex negative for DVT but will repeat  - f/u duplex- pending    MSK  Onset of debilitating b/l hand cramps and some muscles weakness x several months, unclear etiology, radiculopathy? vs myositis?   - MRI outpatient reportedly showed cervical spine issues, started on Prednisone shortly before admission at OSH  - f/u myomarkers  - seen by neurologist at [facility]   - symptoms improved with cellcept (9/8-9/11) but discontinued given Afib RVR   - f/u with Dr. Nik Marie or Dante Urbano for EMG upon discharge (osh?)      RHEUM  - started on Solumedrol 60mg q6h from 9/1 to 9/6 then weaned over time to then Medrol 32 mg 9/9 to 9/12 at [facility]  - s/p Cellcept 9/8 to 9/11 but stopped due to Afib- RVR/tachycardia   - CT findings, Improved/decreased extent of bilateral ground glass opacities and reticular markings compared to the previous study. Findings are nonspecific but differential etiologies include interstitial pneumonia, drug toxicity, nonspecific connective tissue disorders.  - OSH labs show strongly positive ARIANA, RNP, and anti smith antibodies with low C4 and normal C3. 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 First session  (9/15), next session planned for today 9/19 (will need to coordinate time with perfusion and blood bank)    SKIN  Reportedly had single episode of painful rash on her shins, ears and neck and chest which resolved with a steroid cream from a dermatologist prior to admission  - no evidence of rash currently      PROPHYLAXIS  # DVT: argatroban  # GI: TF      LINES  -Ecmo Cannulas  -LIJ TLC- 9/13 (placed at OSH)  -Left Axill [place] - 9/13 (placed at OSH)  -RA 16g PIV x2- 9/15      GOC  -Palliative Following   61 yo F w/ Afib initially presented to urgent care for SOB where she had an XR done concerning for b/l lower infiltrates, sent to [facility] ED and  admitted to [facility] on 8/26 after being found to be hypoxemic, reported having  debilitating b/l hand numbness/tingling and some muscles weakness several months. Found to be hypoxic and have interstitial lung disease appearance on CT, admitted 8/26 for AHRF, further ILD workup and management,  started on prednisone on admission with gradually improving O2 requirement and has been stable on 2-3LNC since 9/3, underwent bronchoscopy with TBBX on 8/30 which showed Non-Specific Intersitial Disease (NSIP)/OP. Labs notable for positive ARIANA 1:1280, RNP > 8, Alejandro > 8. Patient continued on steroids and recieved cellcept, which was discontinued 2/2 Afib-RVR. Interval CTA chest on 9/11 also negative PE did show possible lingula pneumonia.  Started on empiric vancomycin and zosyn 9/12, course was then c/b episode of SVT to 170s w/ rapidly progressive hypoxemic respiratory failure, placed NRB offered HFNC/BiPAP but refused, leading to worsening hypoxemic respiratory failure requiring intubation  9/13. Despite best practices, patient remained hypoxemic and patient was cannulated for VV-ECMO on 9/13 and transferred to [facility] for further management.       NEUROLOGY  Home meds: gabapentin 100g TID  AA&Ox3 at baseline   - opened eyes weakly, moves extremities weakly and following simple commands   - currently on precedex for comfort remains off dilaudid   - [facility] 9/14 no acute findings  - Palliative following  - Social Work consult  - PT/OT following    PULMONARY  Admitted to [facility] on 8/26 after p/w SOB, found to be hypoxemic, required 2-4L NC/bibap, initially responded well to IV steroids. 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  - CT findings at OSH consistent with ILD   - Current ventilator settings: PC  RR 12, PS 15, PEEP 12 Fi02 40% pulling TV ~300-600  - Emergency settings: VC 28/400/12/100%  - Bronchoscopy showed mild thick yellow secretions in trachea, diffuse moderate thin green/brown secretions throughout, serial BAL x3 performed of RML with progressively bloody return indicating DAH likely 2/2 SLE vs MCTD?  - rheum consulted for DAH  - continue with duonebs,  trialed IPV but vomited shortly after initiation, now discontinued,  - c/w empiric abx     ECMO  VV ECMO		  Cannulation sites/dates: 9/13/23 R fem 25F. RIJ 19F   Flow: 3.7		P1: 162  	Delta P: 22  RPM: 2850		P2: 140		SVO2: 78  Sweep: 1 L/min:              FIO2: 0.4      - 9/14 right groin cannula pulled-back ~3-4cm to position tip of cannula just inferior to hepatic vein   - clinically perfusing. Lactate: 2.3  - ECMO sweep sighing q1hr  - Adjust circuit flow as tolerated with DO2:VO2 goal >2  - Monitor for hemolysis, chatter, evidence of recirculation   - Plan to call CT surgery PA in the AM to suture right femoral cannula securely now that patient is more alert/awake      CARDIOVASCULAR  Hx of Afib w at OSH, started on Amiodarone gtt now in shock state requiring requiring pressors likely septic with component of vaspoplegia i/s/o sedation, possible contribution of cardiogenic from RV dysfunction   - No PE seen on invasive pulmonary angiography at time of ECMO cannulation or in recent imaging  - NO2 weaned off  (9/15) RV function remains unchanged  - s/p milrinone, off since (9/13)  - no longer requiring pressors to maintain map >65  - Roosevelt off since ( 9/14) and Vaso off since (9/15) Levo off since (9/18)  - converted to sinus (9/14) discontinued amio gtt iso bradycardia   - RITCHIE 9/14 : No MEREDITH thrombus noted, negative for PFO. LVOT diameter of 2 cm  - TTE 9/12 with EF 65-70% with normal LV and RV  - TTE 9/14 with  EF 18%. Severe global LV systolic dysfunction. RV enlargement with decreased RV systolic function, however RITCHIE and recent POCUS shows normal LV systolic function, improved RV systolic function     GASTROINTESTINAL  #Transaminis  #Hypertriglyceridemia   - likely 2/2 combination of shock liver, malposition of ECMO cannula and liver dysfunction  - Acute hepatitis panel negative  - RUQ US 9/15 shows fatty infiltration of liver, negative for hepatobiliary pathology, will repeat RUQ ultrasound  - ECMO canula repositioned appropriately 9/14  - Hepatology recommends RUQ with dopplers if bili and LFT's continue to rise   - Triglycerides elevated to 836, on insulin gtt for hyperglycemia continues to downtrend   - Episode of vomiting 9/16, tube feeds held and given reglan x48 hours, restarted TF 9/17, tolerating  - BM noted 9/17 and 9/18  - continue bowel regimen senna, miralax and movantik to avoid opioid induced constipation  - CT abdomen 9/15 w/o bowel obstruction, noted with colonic diverticulosis mostly in sigmoid, w/o evidence of diverticulitis  - continue PPI iso steroids   - nutrition following      RENAL  #Hypernatremic   sCr baseline 0.78  - Creatinine wnl  - s/p diuresis with lasix, last administered 9/15  - now with hemoconcentrator to assist with fluid removal was removing -50cc/h overnight   - metabolic alkalosis, ?contraction, goal to keep slightly net negative to net even given hypernatremia and elevated bicarb  -passed TOV on 9/18 continue to monitor   - hypernatremia improving s/p d5W, and c/w give free h20 250ml q8       INFECTIOUS DISEASE  Leukopenia likely transient? vs drug induced vs infection vs malignancy?  - WBC 0.29, remains afebrile however temperature has been regulated via ECMO circuit, will turn off and monitor for fever  - Bactrim DS discontinued iso leukopenia, continue Mepron for PJP prophylaxis instead  - heme following- thrombocytopenia noted as well as leukopenia, likely drug induced? filgrastim started 9/17  - s/p IV Ceftriaxone 5 days? at [facility] out of an abundance of precaution to cover BAL specimen  - s/p zosyn at OSH (9/12) for lingula PNA  - vancomycin (9/12-9/15 ) d/c'd negative mrsa PCR, and azithro (9/14-9/15) d/c'd neg Urine legionella  - restarted Vancomycin ppx (9/18-  )  iso leukopenia and c/w empiric donald (9/13- ) for 7 day course   -will resend MRSA/MSSA swab and sputum 9/19 and if negative will D/C vancomycin   - draw vancomycin level prior to 4th dose 9/20   - 9/13 and 9/16 urine, sputum and blood cx ngtd  - positive COVID-19 antigen in late August  - f/u BAL, cultures, cytopathology, IL2 receptor  - ID consulted for leukopenia    ENDOCRINE  # Hyperglycemia i/s/o steroids  Admission A1c 6.1  - hyperglycemia iso steroids, glucose remained elevated despite increase of NPH and ISS  - currently on insulin gtt for tighter glycemic control will transition back to NPH/SS given decreasing steroids   - elevated triglycerides 835, has hx of fatty liver, was on propofol gtt. TG downtrending since initiating insulin gtt for hyperglycemia, now 112, continue to monitor daily  - TSH low at 0.13/Free T4 slightly low 0.7, will repeat in a few days      HEME  # ECMO anticoagulation  - continue argatroban gtt goal aPTT 50-70  - INR elevated likely iso argatroban and liver dysfxn  - platelets continue to downtrend, refractory after transfusion, 47  -> 51 -> 46 now 25  - s/p Vit. K (9/14) and FFP x1 (9/15), platelets x 4units (9/17, 9/18)  - fibrinogen 250, monitor daily  - Hgb remains stable 11.3  - Transfuse for Hgb <7, Plt<10  - Heme consult    #Leukopenia  #Thrombocytopenia  - Heme consult placed for thrombocytopenia, noted to also be leukopenic  - unlikely HLH/MAS since many abnormalities can be explained by severe hypoxemia/hypotension during initial decompensation prior to ecmo cannulation but some features that are consistent and with rheumatic disease it is a possibility to f/u hematology regarding utility of further lab/pathologic testing  - peripheral blood smear reviewed: large platelets noted, no platelet clumps, no blast cells noted, neutrophils noted w/ normal number of lobes, nucleated RBC noted  - acute hepatitis panel negative  -c/w filgrastim 480 daily until ANC >1500 in the setting of possible infxn  -will send HIT/BEN/ and platelet blue top   -PLT count 25 will aim for PLT>50 will give 1u PLT today      #R/O DVT  - LE duplex negative for DVT but will repeat  - f/u duplex- pending    MSK  Onset of debilitating b/l hand cramps and some muscles weakness x several months, unclear etiology, radiculopathy? vs myositis?   - MRI outpatient reportedly showed cervical spine issues, started on Prednisone shortly before admission at OSH  - f/u myomarkers  - seen by neurologist at [facility]   - symptoms improved with cellcept (9/8-9/11) but discontinued given Afib RVR   - f/u with Dr. Nik Marie or Dante Urbano for EMG upon discharge (osh?)      RHEUM  - started on Solumedrol 60mg q6h from 9/1 to 9/6 then weaned over time to then Medrol 32 mg 9/9 to 9/12 at [facility]  - s/p Cellcept 9/8 to 9/11 but stopped due to Afib- RVR/tachycardia   - CT findings, Improved/decreased extent of bilateral ground glass opacities and reticular markings compared to the previous study. Findings are nonspecific but differential etiologies include interstitial pneumonia, drug toxicity, nonspecific connective tissue disorders.  - OSH labs show strongly positive ARIANA, RNP, and anti smith antibodies with low C4 and normal C3. 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 First session  (9/15), 2nd session 9/18  (will need to coordinate time with perfusion and blood bank)    SKIN  Reportedly had single episode of painful rash on her shins, ears and neck and chest which resolved with a steroid cream from a dermatologist prior to admission  - no evidence of rash currently      PROPHYLAXIS  # DVT: argatroban  # GI: TF      LINES  -Ecmo Cannulas  -LIJ TLC- 9/13 (placed at OSH)  -Left Axillae [place] - 9/13 (placed at OSH)  -RA 16g PIV x2- 9/15      GOC  -Palliative Following     63 yo F w/ Afib initially presented to urgent care for SOB where she had an XR done concerning for b/l lower infiltrates, sent to [facility] ED and  admitted to [facility] on 8/26 after being found to be hypoxemic, reported having  debilitating b/l hand numbness/tingling and some muscles weakness several months. Found to be hypoxic and have interstitial lung disease appearance on CT, admitted 8/26 for AHRF, further ILD workup and management,  started on prednisone on admission with gradually improving O2 requirement and has been stable on 2-3LNC since 9/3, underwent bronchoscopy with TBBX on 8/30 which showed Non-Specific Intersitial Disease (NSIP)/OP. Labs notable for positive ARIANA 1:1280, RNP > 8, Alejandro > 8. Patient continued on steroids and recieved cellcept, which was discontinued 2/2 Afib-RVR. Interval CTA chest on 9/11 also negative PE did show possible lingula pneumonia.  Started on empiric vancomycin and zosyn 9/12, course was then c/b episode of SVT to 170s w/ rapidly progressive hypoxemic respiratory failure, placed NRB offered HFNC/BiPAP but refused, leading to worsening hypoxemic respiratory failure requiring intubation  9/13. Despite best practices, patient remained hypoxemic and patient was cannulated for VV-ECMO on 9/13 and transferred to [facility] for further management.       NEUROLOGY  Home meds: gabapentin 100g TID  AA&Ox3 at baseline   - opened eyes weakly, moves extremities weakly and following simple commands   - currently on precedex for comfort remains off dilaudid since 9/18  - [facility] 9/14 no acute findings  - Palliative following  - Social Work consult  - PT/OT following    PULMONARY  Admitted to [facility] on 8/26 after p/w SOB, found to be hypoxemic, required 2-4L NC/bibap, initially responded well to IV steroids. 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  - rheum consulted for DAH  - continue with duonebs,  trialed IPV but vomited shortly after initiation, now discontinued,  - c/w empiric abx     ECMO  VV ECMO		  Cannulation sites/dates: 9/13/23 R fem 25F. RIJ 19F   Flow: 3.7		P1: 162  	Delta P: 22  RPM: 2850		P2: 140		SVO2: 78  Sweep: 1 L/min:              FIO2: 0.4      - 9/14 right groin cannula pulled-back ~3-4cm to position tip of cannula just inferior to hepatic vein   - clinically perfusing. Lactate: 2.3  - ECMO sweep sighing q1hr  - Adjust circuit flow as tolerated with DO2:VO2 goal >2  - Monitor for hemolysis, chatter, evidence of recirculation       CARDIOVASCULAR  Hx of Afib w at OSH, started on Amiodarone gtt now in shock state requiring requiring pressors likely septic with component of vaspoplegia i/s/o sedation, possible contribution of cardiogenic from RV dysfunction   - No PE seen on invasive pulmonary angiography at time of ECMO cannulation or in recent imaging  - NO2 weaned off  (9/15) RV function remains unchanged  - s/p milrinone, off since (9/13)  - no longer requiring pressors to maintain map >65  - Roosevelt off since ( 9/14) and Vaso off since (9/15) Levo off since (9/18)  - converted to sinus (9/14) discontinued amio gtt iso bradycardia   - RITCHIE 9/14 : No MEREDITH thrombus noted, negative for PFO. LVOT diameter of 2 cm  - TTE 9/12 with EF 65-70% with normal LV and RV  - TTE 9/14 with  EF 18%. Severe global LV systolic dysfunction. RV enlargement with decreased RV systolic function, however RITCHIE and recent POCUS shows normal LV systolic function, improved RV systolic function     GASTROINTESTINAL  #Transaminitis  #Hypertriglyceridemia   - likely 2/2 combination of shock liver, malposition of ECMO cannula and liver dysfunction  - Acute hepatitis panel negative  - RUQ US 9/15 shows fatty infiltration of liver, negative for hepatobiliary pathology, will repeat RUQ ultrasound  - ECMO canula repositioned appropriately 9/14  - Hepatology recommends RUQ with dopplers if bili and LFT's continue to rise   - Triglycerides elevated to 836, on insulin gtt for hyperglycemia continues to downtrend   - Episode of vomiting 9/16, tube feeds held and given reglan x48 hours, restarted TF 9/17, tolerating  - BM noted 9/17 and 9/18  - continue bowel regimen senna, miralax and movantik to avoid opioid induced constipation  - CT abdomen 9/15 w/o bowel obstruction, noted with colonic diverticulosis mostly in sigmoid, w/o evidence of diverticulitis  - continue PPI iso steroids   - nutrition following      RENAL  #Hypernatremic   sCr baseline 0.78  - Creatinine wnl  - s/p diuresis with lasix, last administered 9/15  - now with hemoconcentrator to assist with fluid removal was removing -50cc/h overnight   - metabolic alkalosis, ?contraction, goal to keep slightly net negative to net even given hypernatremia and elevated bicarb  -passed TOV on 9/18 continue to monitor   - hypernatremia improving s/p d5W, and c/w give free h20 250ml q8       INFECTIOUS DISEASE  Leukopenia likely transient? vs drug induced vs infection vs malignancy?  - WBC 0.29, remains afebrile however temperature has been regulated via ECMO circuit, will turn off and monitor for fever  - Bactrim DS discontinued iso leukopenia, continue Mepron for PJP prophylaxis instead  - heme following- thrombocytopenia noted as well as leukopenia, likely drug induced? filgrastim started 9/17  - s/p IV Ceftriaxone 5 days? at [facility] out of an abundance of precaution to cover BAL specimen  - s/p zosyn at OSH (9/12) for lingula PNA  - vancomycin (9/12-9/15 ) d/c'd negative mrsa PCR, and azithro (9/14-9/15) d/c'd neg Urine legionella  - restarted Vancomycin ppx (9/18-9/19)  iso leukopenia and c/w empiric donald (9/13- ) for 7 day course   -will resend MRSA/MSSA swab and sputum 9/19 and if negative will D/C vancomycin   - 9/13 and 9/16 urine, sputum and blood cx ngtd  - positive COVID-19 antigen in late August  - f/u BAL, cultures, cytopathology, IL2 receptor  - ID consulted for leukopenia    ENDOCRINE  # Hyperglycemia i/s/o steroids  Admission A1c 6.1  - hyperglycemia iso steroids, glucose remained elevated despite increase of NPH and ISS  - currently on insulin gtt for tighter glycemic control will transition back to NPH/SS given decreasing steroids and glucose better maintained, will need to watch glucose closely and adjust NPH as steroids are tapered down   - elevated triglycerides 835, has hx of fatty liver, was on propofol gtt. TG downtrending since initiating insulin gtt for hyperglycemia, now 112, continue to monitor daily  - TSH low at 0.13/Free T4 slightly low 0.7, will repeat in a few days      HEME  # ECMO anticoagulation  - continue argatroban gtt goal aPTT 50-70  - INR elevated likely iso argatroban and liver dysfxn  - platelets continue to downtrend, refractory after transfusion, 47  -> 51 -> 46 now 25  - s/p Vit. K (9/14) and FFP x1 (9/15), platelets x 4units (9/17, 9/18)  - fibrinogen 250, monitor daily  - Hgb remains stable 11.3  - Transfuse for Hgb <7, Plt<10  - Heme consult    #Leukopenia  #Thrombocytopenia  - Heme consult placed for thrombocytopenia, noted to also be leukopenic  - unlikely HLH/MAS since many abnormalities can be explained by severe hypoxemia/hypotension during initial decompensation prior to ecmo cannulation but some features that are consistent and with rheumatic disease it is a possibility to f/u hematology regarding utility of further lab/pathologic testing  - peripheral blood smear reviewed: large platelets noted, no platelet clumps, no blast cells noted, neutrophils noted w/ normal number of lobes, nucleated RBC noted  - acute hepatitis panel negative  -c/w filgrastim 480 daily until ANC >1500 in the setting of possible infxn  -will send HIT/BEN/ and platelet blue top   -intermittent PLTS given 5u total for hospital stay   -PLT count 25 today will aim for PLT>50 will give 1u PLT today      #R/O DVT  - LE duplex negative for DVT but will repeat  - f/u duplex- pending    MSK  Onset of debilitating b/l hand cramps and some muscles weakness x several months, unclear etiology, radiculopathy? vs myositis?   - MRI outpatient reportedly showed cervical spine issues, started on Prednisone shortly before admission at OSH  - f/u myomarkers  - seen by neurologist at [facility]   - symptoms improved with cellcept (9/8-9/11) but discontinued given Afib RVR   - f/u with Dr. Nik Marie or Dante Urbano for EMG upon discharge (osh?)      RHEUM  - started on Solumedrol 60mg q6h from 9/1 to 9/6 then weaned over time to then Medrol 32 mg 9/9 to 9/12 at [facility]  - s/p Cellcept 9/8 to 9/11 but stopped due to Afib- RVR/tachycardia   - CT findings, Improved/decreased extent of bilateral ground glass opacities and reticular markings compared to the previous study. Findings are nonspecific but differential etiologies include interstitial pneumonia, drug toxicity, nonspecific connective tissue disorders.  - OSH labs show strongly positive ARIANA, RNP, and anti smith antibodies with low C4 and normal C3. 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 First session  (9/15), 2nd session 9/18, 3rd session planned for 9/20 (will need to coordinate time with perfusion and blood bank)    SKIN  Reportedly had single episode of painful rash on her shins, ears and neck and chest which resolved with a steroid cream from a dermatologist prior to admission  - no evidence of rash currently      PROPHYLAXIS  # DVT: argatroban  # GI: TF      LINES  -Ecmo Cannulas  -LIJ TLC- 9/13 (placed at OSH)  -Left Axillae Traci - 9/13 (placed at OSH)  -RA 16g PIV x2- 9/15      GOC  -Palliative Following     61 yo F w/ Afib initially presented to urgent care for SOB where she had an XR done concerning for b/l lower infiltrates, sent to [facility] ED and  admitted to [facility] on 8/26 after being found to be hypoxemic, reported having  debilitating b/l hand numbness/tingling and some muscles weakness several months. Found to be hypoxic and have interstitial lung disease appearance on CT, admitted 8/26 for AHRF, further ILD workup and management,  started on prednisone on admission with gradually improving O2 requirement and has been stable on 2-3LNC since 9/3, underwent bronchoscopy with TBBX on 8/30 which showed Non-Specific Intersitial Disease (NSIP)/OP. Labs notable for positive ARIANA 1:1280, RNP > 8, Alejandro > 8. Patient continued on steroids and recieved cellcept, which was discontinued 2/2 Afib-RVR. Interval CTA chest on 9/11 also negative PE did show possible lingula pneumonia.  Started on empiric vancomycin and zosyn 9/12, course was then c/b episode of SVT to 170s w/ rapidly progressive hypoxemic respiratory failure, placed NRB offered HFNC/BiPAP but refused, leading to worsening hypoxemic respiratory failure requiring intubation  9/13. Despite best practices, patient remained hypoxemic and patient was cannulated for VV-ECMO on 9/13 and transferred to [facility] for further management.       NEUROLOGY  Home meds: gabapentin 100g TID  AA&Ox3 at baseline   - opened eyes weakly, moves extremities weakly and following simple commands   - currently on precedex for comfort remains off dilaudid since 9/18  - [facility] 9/14 no acute findings  - Palliative following  - Social Work consult  - PT/OT following    PULMONARY  Admitted to [facility] on 8/26 after p/w SOB, found to be hypoxemic, required 2-4L NC/bibap, initially responded well to IV steroids. 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  - rheum consulted for DAH  - continue with duonebs,  trialed IPV but vomited shortly after initiation, now discontinued,  - c/w empiric abx     ECMO  VV ECMO		  Cannulation sites/dates: 9/13/23 R fem 25F. RIJ 19F   Flow: 3.7		P1: 162  	Delta P: 22  RPM: 2850		P2: 140		SVO2: 78  Sweep: 1 L/min:              FIO2: 0.5    - 9/14 right groin cannula pulled-back ~3-4cm to position tip of cannula just inferior to hepatic vein   - clinically perfusing. Lactate: 2.3  - ECMO sweep sighing q1hr  - Adjust circuit flow as tolerated with DO2:VO2 goal >2  - Monitor for hemolysis, chatter, evidence of recirculation     CARDIOVASCULAR  #Atrial fibrillation  - Afib at OSH, started on Amiodarone gtt now in shock state requiring requiring pressors likely septic with component of vaspoplegia i/s/o sedation, possible contribution of cardiogenic from RV dysfunction   - No PE seen on invasive pulmonary angiography at time of ECMO cannulation or in recent imaging  - NO2 weaned off  (9/15) RV function remains unchanged  - s/p milrinone, off since (9/13)  - no longer requiring pressors to maintain map >65  - Roosevelt off since ( 9/14) and Vaso off since (9/15) Levo off since (9/18)  - converted to sinus (9/14) discontinued amio gtt iso bradycardia, now with episode of rapid afib, responed to metoprolol 5mg IVPx1 and will start metoprolol 25mg BID and increase as needed   - RITCHIE 9/14 : No MEREDITH thrombus noted, negative for PFO. LVOT diameter of 2 cm  - TTE 9/12 with EF 65-70% with normal LV and RV  - TTE 9/14 with  EF 18%. Severe global LV systolic dysfunction. RV enlargement with decreased RV systolic function, however RITCHIE and recent POCUS shows normal LV systolic function, improved RV systolic function   -will repeat TTE today    GASTROINTESTINAL  #Transaminitis  #Hypertriglyceridemia   - likely 2/2 combination of shock liver, malposition of ECMO cannula and liver dysfunction  - Acute hepatitis panel negative  - RUQ US 9/15 shows fatty infiltration of liver, negative for hepatobiliary pathology, will repeat RUQ ultrasound  - ECMO canula repositioned appropriately 9/14  - Hepatology recommends RUQ with dopplers if bili and LFT's continue to rise   - Triglycerides elevated to 836, on insulin gtt for hyperglycemia continues to downtrend   - Episode of vomiting 9/16, tube feeds held and given reglan x48 hours, restarted TF 9/17, tolerating  - BM noted 9/17 and 9/18  - continue bowel regimen senna, miralax and movantik to avoid opioid induced constipation  - CT abdomen 9/15 w/o bowel obstruction, noted with colonic diverticulosis mostly in sigmoid, w/o evidence of diverticulitis  - continue PPI iso steroids   - nutrition following    RENAL  #Hypernatremic   sCr baseline 0.78  - Creatinine wnl  - s/p diuresis with lasix, last administered 9/15  - now with hemoconcentrator to assist with fluid removal was removing -50cc/h overnight   - metabolic alkalosis, ?contraction, goal to keep slightly net negative to net even given hypernatremia and elevated bicarb  -passed TOV on 9/18 continue to monitor   - hypernatremia improving s/p d5W, and c/w give free h20 250ml q8     INFECTIOUS DISEASE  Leukopenia likely transient? vs drug induced vs infection vs malignancy?  - WBC 0.29, remains afebrile however temperature has been regulated via ECMO circuit, will turn off and monitor for fever  - Bactrim DS discontinued iso leukopenia, continue Mepron for PJP prophylaxis instead  - heme following- thrombocytopenia noted as well as leukopenia, likely drug induced? filgrastim started 9/17  - s/p IV Ceftriaxone 5 days? at [facility] out of an abundance of precaution to cover BAL specimen  - s/p zosyn at OSH (9/12) for lingula PNA  - vancomycin (9/12-9/15 ) d/c'd negative mrsa PCR, and azithro (9/14-9/15) d/c'd neg Urine legionella  - restarted Vancomycin ppx (9/18-9/19)  iso leukopenia and c/w empiric donald (9/13- ) for 7 day course   -will resend MRSA/MSSA swab and sputum 9/19 and if negative will D/C vancomycin   - 9/13 and 9/16 urine, sputum and blood cx ngtd  - positive COVID-19 antigen in late August  - f/u BAL, cultures, cytopathology, IL2 receptor  - ID consulted for leukopenia    ENDOCRINE  # Hyperglycemia i/s/o steroids  Admission A1c 6.1  - hyperglycemia iso steroids, glucose remained elevated despite increase of NPH and ISS  - currently on insulin gtt for tighter glycemic control will transition back to NPH/SS given decreasing steroids and glucose better maintained, will need to watch glucose closely and adjust NPH as steroids are tapered down   - elevated triglycerides 835, has hx of fatty liver, was on propofol gtt. TG downtrending since initiating insulin gtt for hyperglycemia, now 112, continue to monitor daily  - TSH low at 0.13/Free T4 slightly low 0.7, will repeat in a few days    HEME  # ECMO anticoagulation  - continue argatroban gtt goal aPTT 50-70  - INR elevated likely iso argatroban and liver dysfxn  - platelets continue to downtrend, refractory after transfusion, 47  -> 51 -> 46 now 25  - s/p Vit. K (9/14) and FFP x1 (9/15), platelets x 4units (9/17, 9/18)  - fibrinogen 250, monitor daily  - Hgb remains stable 11.3  - Transfuse for Hgb <7, Plt<10  - Heme consult    #Leukopenia  #Thrombocytopenia  - Heme consult placed for thrombocytopenia, noted to also be leukopenic  - unlikely HLH/MAS since many abnormalities can be explained by severe hypoxemia/hypotension during initial decompensation prior to ecmo cannulation but some features that are consistent and with rheumatic disease it is a possibility to f/u hematology regarding utility of further lab/pathologic testing  - peripheral blood smear reviewed: large platelets noted, no platelet clumps, no blast cells noted, neutrophils noted w/ normal number of lobes, nucleated RBC noted  - acute hepatitis panel negative  -c/w filgrastim 480 daily until ANC >1500 in the setting of possible infxn  -will send HIT/BEN/ and platelet blue top   -intermittent PLTS given 5u total for hospital stay   -PLT count 25 today will aim for PLT>50 will give 1u PLT today      #R/O DVT  - LE duplex negative for DVT but will repeat  - f/u duplex- pending    MSK  Onset of debilitating b/l hand cramps and some muscles weakness x several months, unclear etiology, radiculopathy? vs myositis?   - MRI outpatient reportedly showed cervical spine issues, started on Prednisone shortly before admission at OSH  - f/u myomarkers  - seen by neurologist at [facility]   - symptoms improved with cellcept (9/8-9/11) but discontinued given Afib RVR   - f/u with Dr. Nik Marie or Dante Urbano for EMG upon discharge (osh?)      RHEUM  - started on Solumedrol 60mg q6h from 9/1 to 9/6 then weaned over time to then Medrol 32 mg 9/9 to 9/12 at [facility]  - s/p Cellcept 9/8 to 9/11 but stopped due to Afib- RVR/tachycardia   - CT findings, Improved/decreased extent of bilateral ground glass opacities and reticular markings compared to the previous study. Findings are nonspecific but differential etiologies include interstitial pneumonia, drug toxicity, nonspecific connective tissue disorders.  - OSH labs show strongly positive ARIANA, RNP, and anti smith antibodies with low C4 and normal C3. 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 First session  (9/15), 2nd session 9/18, 3rd session planned for 9/20 (will need to coordinate time with perfusion and blood bank)    SKIN  Reportedly had single episode of painful rash on her shins, ears and neck and chest which resolved with a steroid cream from a dermatologist prior to admission  - no evidence of rash currently    PROPHYLAXIS  # DVT: argatroban  # GI: TF    LINES  -Ecmo Cannulas  -LIJ TLC- 9/13 (placed at OSH)  -Left Axillae Traci - 9/13 (placed at OSH)  -RA 16g PIV x2- 9/15    GOC  -Palliative Following

## 2023-09-19 NOTE — CHART NOTE - NSCHARTNOTEFT_GEN_A_CORE
S/p Rituxan on 9/16/23.  Labs and vitals reviewed with apheresis RN prior to procedure.   On 9/18/23 PLEX#2 performed using 3L plasma as replacement fluid via ECMO line.   Procedure uneventful.  PLEX#3 of 3 on 9/20/23

## 2023-09-19 NOTE — PROGRESS NOTE ADULT - ASSESSMENT
Zo Hager is a 62 year old female with a past medical history of afib who presented to [facility] for rapid deterioration of respiratory status.  ID is consulted to evaluate for need of antimicrobial ppx in setting of ECMO.

## 2023-09-20 DIAGNOSIS — G89.3 NEOPLASM RELATED PAIN (ACUTE) (CHRONIC): ICD-10-CM

## 2023-09-20 LAB
ALBUMIN SERPL ELPH-MCNC: 3.1 G/DL — LOW (ref 3.3–5)
ALBUMIN SERPL ELPH-MCNC: 3.1 G/DL — LOW (ref 3.3–5)
ALBUMIN SERPL ELPH-MCNC: 3.2 G/DL — LOW (ref 3.3–5)
ALBUMIN SERPL ELPH-MCNC: 3.3 G/DL — SIGNIFICANT CHANGE UP (ref 3.3–5)
ALBUMIN SERPL ELPH-MCNC: 3.5 G/DL — SIGNIFICANT CHANGE UP (ref 3.3–5)
ALP SERPL-CCNC: 160 U/L — HIGH (ref 40–120)
ALP SERPL-CCNC: 201 U/L — HIGH (ref 40–120)
ALP SERPL-CCNC: 207 U/L — HIGH (ref 40–120)
ALP SERPL-CCNC: 243 U/L — HIGH (ref 40–120)
ALP SERPL-CCNC: 254 U/L — HIGH (ref 40–120)
ALT FLD-CCNC: 144 U/L — HIGH (ref 4–33)
ALT FLD-CCNC: 182 U/L — HIGH (ref 4–33)
ALT FLD-CCNC: 292 U/L — HIGH (ref 4–33)
ALT FLD-CCNC: 313 U/L — HIGH (ref 4–33)
ALT FLD-CCNC: 317 U/L — HIGH (ref 4–33)
ANION GAP SERPL CALC-SCNC: 10 MMOL/L — SIGNIFICANT CHANGE UP (ref 7–14)
ANION GAP SERPL CALC-SCNC: 7 MMOL/L — SIGNIFICANT CHANGE UP (ref 7–14)
ANION GAP SERPL CALC-SCNC: 8 MMOL/L — SIGNIFICANT CHANGE UP (ref 7–14)
ANION GAP SERPL CALC-SCNC: 8 MMOL/L — SIGNIFICANT CHANGE UP (ref 7–14)
ANION GAP SERPL CALC-SCNC: 9 MMOL/L — SIGNIFICANT CHANGE UP (ref 7–14)
APTT BLD: 52.6 SEC — HIGH (ref 24.5–35.6)
APTT BLD: 53.3 SEC — HIGH (ref 24.5–35.6)
APTT BLD: 55.5 SEC — HIGH (ref 24.5–35.6)
APTT BLD: 56.3 SEC — HIGH (ref 24.5–35.6)
AST SERPL-CCNC: 61 U/L — HIGH (ref 4–32)
AST SERPL-CCNC: 80 U/L — HIGH (ref 4–32)
AST SERPL-CCNC: 86 U/L — HIGH (ref 4–32)
AST SERPL-CCNC: 87 U/L — HIGH (ref 4–32)
AST SERPL-CCNC: 90 U/L — HIGH (ref 4–32)
B PERT IGG+IGM PNL SER: ABNORMAL
BASE EXCESS BLDCOV CALC-SCNC: 8.3 MMOL/L — HIGH (ref -3–2)
BASOPHILS # BLD AUTO: 0 K/UL — SIGNIFICANT CHANGE UP (ref 0–0.2)
BASOPHILS # BLD AUTO: 0.01 K/UL — SIGNIFICANT CHANGE UP (ref 0–0.2)
BASOPHILS NFR BLD AUTO: 0 % — SIGNIFICANT CHANGE UP (ref 0–2)
BASOPHILS NFR BLD AUTO: 0.2 % — SIGNIFICANT CHANGE UP (ref 0–2)
BASOPHILS NFR BLD AUTO: 0.3 % — SIGNIFICANT CHANGE UP (ref 0–2)
BASOPHILS NFR BLD AUTO: 0.7 % — SIGNIFICANT CHANGE UP (ref 0–2)
BILIRUB SERPL-MCNC: 10.3 MG/DL — HIGH (ref 0.2–1.2)
BILIRUB SERPL-MCNC: 12.2 MG/DL — HIGH (ref 0.2–1.2)
BILIRUB SERPL-MCNC: 12.6 MG/DL — HIGH (ref 0.2–1.2)
BILIRUB SERPL-MCNC: 13.6 MG/DL — HIGH (ref 0.2–1.2)
BILIRUB SERPL-MCNC: 14.1 MG/DL — HIGH (ref 0.2–1.2)
BLOOD GAS ARTERIAL - LYTES,HGB,ICA,LACT RESULT: SIGNIFICANT CHANGE UP
BLOOD GAS ECMO POST MEMBRANE - ARTERIAL RESULT: SIGNIFICANT CHANGE UP
BLOOD GAS ECMO PRE MEMBRANE - VENOUS RESULT: SIGNIFICANT CHANGE UP
BLOOD GAS PRE MEMBRANE - GLUCOSE: 166 MG/DL — HIGH (ref 70–99)
BLOOD GAS PRE MEMBRANE - ICALCIUM: 1.19 MMOL/L — SIGNIFICANT CHANGE UP (ref 1.15–1.29)
BLOOD GAS PRE MEMBRANE - POTASSIUM: 4.2 MMOL/L — SIGNIFICANT CHANGE UP (ref 3.4–4.5)
BLOOD GAS PRE MEMBRANE - SODIUM: 139 MMOL/L — SIGNIFICANT CHANGE UP (ref 136–146)
BUN SERPL-MCNC: 38 MG/DL — HIGH (ref 7–23)
BUN SERPL-MCNC: 43 MG/DL — HIGH (ref 7–23)
BUN SERPL-MCNC: 44 MG/DL — HIGH (ref 7–23)
BUN SERPL-MCNC: 45 MG/DL — HIGH (ref 7–23)
BUN SERPL-MCNC: 48 MG/DL — HIGH (ref 7–23)
CALCIUM SERPL-MCNC: 8.9 MG/DL — SIGNIFICANT CHANGE UP (ref 8.4–10.5)
CALCIUM SERPL-MCNC: 9.1 MG/DL — SIGNIFICANT CHANGE UP (ref 8.4–10.5)
CALCIUM SERPL-MCNC: 9.3 MG/DL — SIGNIFICANT CHANGE UP (ref 8.4–10.5)
CALCIUM SERPL-MCNC: 9.3 MG/DL — SIGNIFICANT CHANGE UP (ref 8.4–10.5)
CALCIUM SERPL-MCNC: 9.5 MG/DL — SIGNIFICANT CHANGE UP (ref 8.4–10.5)
CHLORIDE SERPL-SCNC: 101 MMOL/L — SIGNIFICANT CHANGE UP (ref 98–107)
CHLORIDE SERPL-SCNC: 101 MMOL/L — SIGNIFICANT CHANGE UP (ref 98–107)
CHLORIDE SERPL-SCNC: 102 MMOL/L — SIGNIFICANT CHANGE UP (ref 98–107)
CHLORIDE SERPL-SCNC: 104 MMOL/L — SIGNIFICANT CHANGE UP (ref 98–107)
CHLORIDE SERPL-SCNC: 105 MMOL/L — SIGNIFICANT CHANGE UP (ref 98–107)
CO2 SERPL-SCNC: 29 MMOL/L — SIGNIFICANT CHANGE UP (ref 22–31)
CO2 SERPL-SCNC: 29 MMOL/L — SIGNIFICANT CHANGE UP (ref 22–31)
CO2 SERPL-SCNC: 31 MMOL/L — SIGNIFICANT CHANGE UP (ref 22–31)
CO2 SERPL-SCNC: 32 MMOL/L — HIGH (ref 22–31)
CO2 SERPL-SCNC: 32 MMOL/L — HIGH (ref 22–31)
COHGB MFR BLDA: 2.5 % — HIGH (ref 0.5–1.5)
COLOR FLD: ABNORMAL
CREAT SERPL-MCNC: 0.33 MG/DL — LOW (ref 0.5–1.3)
CREAT SERPL-MCNC: 0.33 MG/DL — LOW (ref 0.5–1.3)
CREAT SERPL-MCNC: 0.35 MG/DL — LOW (ref 0.5–1.3)
CREAT SERPL-MCNC: 0.37 MG/DL — LOW (ref 0.5–1.3)
CREAT SERPL-MCNC: 0.37 MG/DL — LOW (ref 0.5–1.3)
EGFR: 113 ML/MIN/1.73M2 — SIGNIFICANT CHANGE UP
EGFR: 113 ML/MIN/1.73M2 — SIGNIFICANT CHANGE UP
EGFR: 114 ML/MIN/1.73M2 — SIGNIFICANT CHANGE UP
EGFR: 116 ML/MIN/1.73M2 — SIGNIFICANT CHANGE UP
EGFR: 116 ML/MIN/1.73M2 — SIGNIFICANT CHANGE UP
EOSINOPHIL # BLD AUTO: 0 K/UL — SIGNIFICANT CHANGE UP (ref 0–0.5)
EOSINOPHIL # BLD AUTO: 0.01 K/UL — SIGNIFICANT CHANGE UP (ref 0–0.5)
EOSINOPHIL # BLD AUTO: 0.01 K/UL — SIGNIFICANT CHANGE UP (ref 0–0.5)
EOSINOPHIL # BLD AUTO: 0.04 K/UL — SIGNIFICANT CHANGE UP (ref 0–0.5)
EOSINOPHIL # FLD: 0 % — SIGNIFICANT CHANGE UP
EOSINOPHIL NFR BLD AUTO: 0 % — SIGNIFICANT CHANGE UP (ref 0–6)
EOSINOPHIL NFR BLD AUTO: 0.2 % — SIGNIFICANT CHANGE UP (ref 0–6)
EOSINOPHIL NFR BLD AUTO: 0.7 % — SIGNIFICANT CHANGE UP (ref 0–6)
EOSINOPHIL NFR BLD AUTO: 4 % — SIGNIFICANT CHANGE UP (ref 0–6)
FLUID INTAKE SUBSTANCE CLASS: SIGNIFICANT CHANGE UP
FOLATE+VIT B12 SERBLD-IMP: 0 % — SIGNIFICANT CHANGE UP
GLUCOSE BLDC GLUCOMTR-MCNC: 154 MG/DL — HIGH (ref 70–99)
GLUCOSE BLDC GLUCOMTR-MCNC: 161 MG/DL — HIGH (ref 70–99)
GLUCOSE BLDC GLUCOMTR-MCNC: 182 MG/DL — HIGH (ref 70–99)
GLUCOSE BLDC GLUCOMTR-MCNC: 189 MG/DL — HIGH (ref 70–99)
GLUCOSE BLDC GLUCOMTR-MCNC: 189 MG/DL — HIGH (ref 70–99)
GLUCOSE SERPL-MCNC: 174 MG/DL — HIGH (ref 70–99)
GLUCOSE SERPL-MCNC: 175 MG/DL — HIGH (ref 70–99)
GLUCOSE SERPL-MCNC: 203 MG/DL — HIGH (ref 70–99)
GLUCOSE SERPL-MCNC: 221 MG/DL — HIGH (ref 70–99)
GLUCOSE SERPL-MCNC: 238 MG/DL — HIGH (ref 70–99)
GRAM STN FLD: SIGNIFICANT CHANGE UP
HAPTOGLOB SERPL-MCNC: <20 MG/DL — LOW (ref 34–200)
HCO3, PRE MEMBRANE VENOUS: 33 MMOL/L — HIGH (ref 20–27)
HCT VFR BLD CALC: 34.9 % — SIGNIFICANT CHANGE UP (ref 34.5–45)
HCT VFR BLD CALC: 35.8 % — SIGNIFICANT CHANGE UP (ref 34.5–45)
HCT VFR BLD CALC: 35.9 % — SIGNIFICANT CHANGE UP (ref 34.5–45)
HCT VFR BLD CALC: 36.1 % — SIGNIFICANT CHANGE UP (ref 34.5–45)
HGB BLD-MCNC: 11.2 G/DL — LOW (ref 11.5–15.5)
HGB BLD-MCNC: 11.6 G/DL — SIGNIFICANT CHANGE UP (ref 11.5–15.5)
HGB BLD-MCNC: 11.6 G/DL — SIGNIFICANT CHANGE UP (ref 11.5–15.5)
HGB BLD-MCNC: 11.7 G/DL — SIGNIFICANT CHANGE UP (ref 11.5–15.5)
IANC: 0.51 K/UL — LOW (ref 1.8–7.4)
IANC: 1.01 K/UL — LOW (ref 1.8–7.4)
IANC: 2.63 K/UL — SIGNIFICANT CHANGE UP (ref 1.8–7.4)
IANC: 5.13 K/UL — SIGNIFICANT CHANGE UP (ref 1.8–7.4)
IMM GRANULOCYTES NFR BLD AUTO: 0.7 % — SIGNIFICANT CHANGE UP (ref 0–0.9)
IMM GRANULOCYTES NFR BLD AUTO: 1.1 % — HIGH (ref 0–0.9)
IMM GRANULOCYTES NFR BLD AUTO: 1.5 % — HIGH (ref 0–0.9)
INR BLD: 1.63 RATIO — HIGH (ref 0.85–1.18)
LACTATE, PRE MEMBRANE VENOUS: 1.6 MMOL/L — SIGNIFICANT CHANGE UP (ref 0.5–2)
LDH SERPL L TO P-CCNC: 436 U/L — HIGH (ref 135–225)
LYMPHOCYTES # BLD AUTO: 0.07 K/UL — LOW (ref 1–3.3)
LYMPHOCYTES # BLD AUTO: 0.1 K/UL — LOW (ref 1–3.3)
LYMPHOCYTES # BLD AUTO: 0.2 K/UL — LOW (ref 1–3.3)
LYMPHOCYTES # BLD AUTO: 0.35 K/UL — LOW (ref 1–3.3)
LYMPHOCYTES # BLD AUTO: 19 % — SIGNIFICANT CHANGE UP (ref 13–44)
LYMPHOCYTES # BLD AUTO: 3 % — LOW (ref 13–44)
LYMPHOCYTES # BLD AUTO: 5.1 % — LOW (ref 13–44)
LYMPHOCYTES # BLD AUTO: 5.7 % — LOW (ref 13–44)
LYMPHOCYTES # FLD: 5 % — SIGNIFICANT CHANGE UP
MAGNESIUM SERPL-MCNC: 2.2 MG/DL — SIGNIFICANT CHANGE UP (ref 1.6–2.6)
MAGNESIUM SERPL-MCNC: 2.3 MG/DL — SIGNIFICANT CHANGE UP (ref 1.6–2.6)
MAGNESIUM SERPL-MCNC: 2.4 MG/DL — SIGNIFICANT CHANGE UP (ref 1.6–2.6)
MAGNESIUM SERPL-MCNC: 2.4 MG/DL — SIGNIFICANT CHANGE UP (ref 1.6–2.6)
MCHC RBC-ENTMCNC: 30.1 PG — SIGNIFICANT CHANGE UP (ref 27–34)
MCHC RBC-ENTMCNC: 30.3 PG — SIGNIFICANT CHANGE UP (ref 27–34)
MCHC RBC-ENTMCNC: 30.3 PG — SIGNIFICANT CHANGE UP (ref 27–34)
MCHC RBC-ENTMCNC: 30.4 PG — SIGNIFICANT CHANGE UP (ref 27–34)
MCHC RBC-ENTMCNC: 32.1 GM/DL — SIGNIFICANT CHANGE UP (ref 32–36)
MCHC RBC-ENTMCNC: 32.3 GM/DL — SIGNIFICANT CHANGE UP (ref 32–36)
MCHC RBC-ENTMCNC: 32.4 GM/DL — SIGNIFICANT CHANGE UP (ref 32–36)
MCHC RBC-ENTMCNC: 32.4 GM/DL — SIGNIFICANT CHANGE UP (ref 32–36)
MCV RBC AUTO: 92.8 FL — SIGNIFICANT CHANGE UP (ref 80–100)
MCV RBC AUTO: 93.5 FL — SIGNIFICANT CHANGE UP (ref 80–100)
MCV RBC AUTO: 93.7 FL — SIGNIFICANT CHANGE UP (ref 80–100)
MCV RBC AUTO: 94.8 FL — SIGNIFICANT CHANGE UP (ref 80–100)
MESOTHL CELL # FLD: 0 % — SIGNIFICANT CHANGE UP
METHGB MFR BLDMV: 0.7 % — SIGNIFICANT CHANGE UP (ref 0–1.5)
MONOCYTES # BLD AUTO: 0.25 K/UL — SIGNIFICANT CHANGE UP (ref 0–0.9)
MONOCYTES # BLD AUTO: 0.29 K/UL — SIGNIFICANT CHANGE UP (ref 0–0.9)
MONOCYTES # BLD AUTO: 0.55 K/UL — SIGNIFICANT CHANGE UP (ref 0–0.9)
MONOCYTES # BLD AUTO: 0.58 K/UL — SIGNIFICANT CHANGE UP (ref 0–0.9)
MONOCYTES NFR BLD AUTO: 16.5 % — HIGH (ref 2–14)
MONOCYTES NFR BLD AUTO: 18.4 % — HIGH (ref 2–14)
MONOCYTES NFR BLD AUTO: 28 % — HIGH (ref 2–14)
MONOCYTES NFR BLD AUTO: 9.4 % — SIGNIFICANT CHANGE UP (ref 2–14)
MONOS+MACROS # FLD: 13 % — SIGNIFICANT CHANGE UP
NEUTROPHILS # BLD AUTO: 0.38 K/UL — LOW (ref 1.8–7.4)
NEUTROPHILS # BLD AUTO: 1.01 K/UL — LOW (ref 1.8–7.4)
NEUTROPHILS # BLD AUTO: 2.63 K/UL — SIGNIFICANT CHANGE UP (ref 1.8–7.4)
NEUTROPHILS # BLD AUTO: 5.13 K/UL — SIGNIFICANT CHANGE UP (ref 1.8–7.4)
NEUTROPHILS NFR BLD AUTO: 31 % — LOW (ref 43–77)
NEUTROPHILS NFR BLD AUTO: 74.4 % — SIGNIFICANT CHANGE UP (ref 43–77)
NEUTROPHILS NFR BLD AUTO: 78.7 % — HIGH (ref 43–77)
NEUTROPHILS NFR BLD AUTO: 83.4 % — HIGH (ref 43–77)
NEUTROPHILS-BODY FLUID: 0 % — SIGNIFICANT CHANGE UP
NRBC # BLD: 10 /100 WBCS — HIGH (ref 0–0)
NRBC # BLD: 4 /100 WBCS — HIGH (ref 0–0)
NRBC # BLD: 5 /100 WBCS — HIGH (ref 0–0)
NRBC # FLD: 0.13 K/UL — HIGH (ref 0–0)
NRBC # FLD: 0.17 K/UL — HIGH (ref 0–0)
NRBC # FLD: 0.26 K/UL — HIGH (ref 0–0)
OTHER CELLS FLD MANUAL: 82 % — SIGNIFICANT CHANGE UP
OXYGEN SATURATION, PRE MEMBRANE VENOUS: 81 % — SIGNIFICANT CHANGE UP (ref 60–85)
OXYHEMOGLOBIN, PRE MEMBRANE VENOUS: 78.5 % — SIGNIFICANT CHANGE UP (ref 59–84)
PCO2, PRE MEMBRANE VENOUS: 48 MMHG — HIGH (ref 39–42)
PH, PRE MEMBRANE VENOUS: 7.45 — HIGH (ref 7.32–7.43)
PHOSPHATE SERPL-MCNC: 2.9 MG/DL — SIGNIFICANT CHANGE UP (ref 2.5–4.5)
PHOSPHATE SERPL-MCNC: 3 MG/DL — SIGNIFICANT CHANGE UP (ref 2.5–4.5)
PHOSPHATE SERPL-MCNC: 3 MG/DL — SIGNIFICANT CHANGE UP (ref 2.5–4.5)
PHOSPHATE SERPL-MCNC: 3.1 MG/DL — SIGNIFICANT CHANGE UP (ref 2.5–4.5)
PLATELET # BLD AUTO: 31 K/UL — LOW (ref 150–400)
PLATELET # BLD AUTO: 33 K/UL — LOW (ref 150–400)
PLATELET # BLD AUTO: 47 K/UL — LOW (ref 150–400)
PLATELET # BLD AUTO: 49 K/UL — LOW (ref 150–400)
PO2, PRE MEMBRANE VENOUS: 50 MMHG — HIGH (ref 35–40)
POTASSIUM SERPL-MCNC: 4.7 MMOL/L — SIGNIFICANT CHANGE UP (ref 3.5–5.3)
POTASSIUM SERPL-MCNC: 4.8 MMOL/L — SIGNIFICANT CHANGE UP (ref 3.5–5.3)
POTASSIUM SERPL-MCNC: 4.8 MMOL/L — SIGNIFICANT CHANGE UP (ref 3.5–5.3)
POTASSIUM SERPL-MCNC: 5 MMOL/L — SIGNIFICANT CHANGE UP (ref 3.5–5.3)
POTASSIUM SERPL-MCNC: 5.1 MMOL/L — SIGNIFICANT CHANGE UP (ref 3.5–5.3)
POTASSIUM SERPL-SCNC: 4.7 MMOL/L — SIGNIFICANT CHANGE UP (ref 3.5–5.3)
POTASSIUM SERPL-SCNC: 4.8 MMOL/L — SIGNIFICANT CHANGE UP (ref 3.5–5.3)
POTASSIUM SERPL-SCNC: 4.8 MMOL/L — SIGNIFICANT CHANGE UP (ref 3.5–5.3)
POTASSIUM SERPL-SCNC: 5 MMOL/L — SIGNIFICANT CHANGE UP (ref 3.5–5.3)
POTASSIUM SERPL-SCNC: 5.1 MMOL/L — SIGNIFICANT CHANGE UP (ref 3.5–5.3)
PROT SERPL-MCNC: 4.6 G/DL — LOW (ref 6–8.3)
PROT SERPL-MCNC: 4.7 G/DL — LOW (ref 6–8.3)
PROT SERPL-MCNC: 5 G/DL — LOW (ref 6–8.3)
PROT SERPL-MCNC: 5.2 G/DL — LOW (ref 6–8.3)
PROT SERPL-MCNC: 5.3 G/DL — LOW (ref 6–8.3)
PROTHROM AB SERPL-ACNC: 18 SEC — HIGH (ref 9.5–13)
RBC # BLD: 3.68 M/UL — LOW (ref 3.8–5.2)
RBC # BLD: 3.83 M/UL — SIGNIFICANT CHANGE UP (ref 3.8–5.2)
RBC # BLD: 3.83 M/UL — SIGNIFICANT CHANGE UP (ref 3.8–5.2)
RBC # BLD: 3.89 M/UL — SIGNIFICANT CHANGE UP (ref 3.8–5.2)
RBC # FLD: 16.7 % — HIGH (ref 10.3–14.5)
RBC # FLD: 16.8 % — HIGH (ref 10.3–14.5)
RBC # FLD: 17 % — HIGH (ref 10.3–14.5)
RBC # FLD: 17.4 % — HIGH (ref 10.3–14.5)
RCV VOL RI: SIGNIFICANT CHANGE UP CELLS/UL (ref 0–5)
SODIUM SERPL-SCNC: 140 MMOL/L — SIGNIFICANT CHANGE UP (ref 135–145)
SODIUM SERPL-SCNC: 140 MMOL/L — SIGNIFICANT CHANGE UP (ref 135–145)
SODIUM SERPL-SCNC: 141 MMOL/L — SIGNIFICANT CHANGE UP (ref 135–145)
SODIUM SERPL-SCNC: 143 MMOL/L — SIGNIFICANT CHANGE UP (ref 135–145)
SODIUM SERPL-SCNC: 144 MMOL/L — SIGNIFICANT CHANGE UP (ref 135–145)
SPECIMEN SOURCE: SIGNIFICANT CHANGE UP
TOTAL CELLS COUNTED, BODY FLUID: 100 CELLS — SIGNIFICANT CHANGE UP
TOTAL HEMOGLOBIN, PRE MEMBRANE VENOUS: 10.6 G/DL — LOW (ref 11.5–15.5)
TOTAL NUCLEATED CELL COUNT, BODY FLUID: SIGNIFICANT CHANGE UP CELLS/UL (ref 0–5)
TRIGL SERPL-MCNC: 157 MG/DL — HIGH
TUBE TYPE: SIGNIFICANT CHANGE UP
WBC # BLD: 1.03 K/UL — LOW (ref 3.8–10.5)
WBC # BLD: 1.36 K/UL — LOW (ref 3.8–10.5)
WBC # BLD: 3.34 K/UL — LOW (ref 3.8–10.5)
WBC # BLD: 6.15 K/UL — SIGNIFICANT CHANGE UP (ref 3.8–10.5)
WBC # FLD AUTO: 1.03 K/UL — LOW (ref 3.8–10.5)
WBC # FLD AUTO: 1.36 K/UL — LOW (ref 3.8–10.5)
WBC # FLD AUTO: 3.34 K/UL — LOW (ref 3.8–10.5)
WBC # FLD AUTO: 6.15 K/UL — SIGNIFICANT CHANGE UP (ref 3.8–10.5)

## 2023-09-20 PROCEDURE — 99233 SBSQ HOSP IP/OBS HIGH 50: CPT

## 2023-09-20 PROCEDURE — 99233 SBSQ HOSP IP/OBS HIGH 50: CPT | Mod: GC

## 2023-09-20 PROCEDURE — 31624 DX BRONCHOSCOPE/LAVAGE: CPT | Mod: GC,59

## 2023-09-20 PROCEDURE — 99291 CRITICAL CARE FIRST HOUR: CPT | Mod: GC

## 2023-09-20 PROCEDURE — 36514 APHERESIS PLASMA: CPT

## 2023-09-20 PROCEDURE — 71045 X-RAY EXAM CHEST 1 VIEW: CPT | Mod: 26

## 2023-09-20 PROCEDURE — 33948 ECMO/ECLS DAILY MGMT-VENOUS: CPT | Mod: GC

## 2023-09-20 RX ORDER — CALCIUM GLUCONATE 100 MG/ML
2 VIAL (ML) INTRAVENOUS ONCE
Refills: 0 | Status: COMPLETED | OUTPATIENT
Start: 2023-09-20 | End: 2023-09-20

## 2023-09-20 RX ORDER — CHLORHEXIDINE GLUCONATE 213 G/1000ML
15 SOLUTION TOPICAL EVERY 12 HOURS
Refills: 0 | Status: DISCONTINUED | OUTPATIENT
Start: 2023-09-20 | End: 2023-09-22

## 2023-09-20 RX ORDER — PROPOFOL 10 MG/ML
20 INJECTION, EMULSION INTRAVENOUS ONCE
Refills: 0 | Status: COMPLETED | OUTPATIENT
Start: 2023-09-20 | End: 2023-09-20

## 2023-09-20 RX ORDER — MEROPENEM 1 G/30ML
1000 INJECTION INTRAVENOUS EVERY 8 HOURS
Refills: 0 | Status: DISCONTINUED | OUTPATIENT
Start: 2023-09-20 | End: 2023-09-20

## 2023-09-20 RX ORDER — FENTANYL CITRATE 50 UG/ML
100 INJECTION INTRAVENOUS ONCE
Refills: 0 | Status: DISCONTINUED | OUTPATIENT
Start: 2023-09-20 | End: 2023-09-20

## 2023-09-20 RX ORDER — PROPOFOL 10 MG/ML
100 INJECTION, EMULSION INTRAVENOUS ONCE
Refills: 0 | Status: DISCONTINUED | OUTPATIENT
Start: 2023-09-20 | End: 2023-09-20

## 2023-09-20 RX ORDER — LIDOCAINE HCL 20 MG/ML
8 VIAL (ML) INJECTION ONCE
Refills: 0 | Status: COMPLETED | OUTPATIENT
Start: 2023-09-20 | End: 2023-09-20

## 2023-09-20 RX ORDER — METOPROLOL TARTRATE 50 MG
12.5 TABLET ORAL
Refills: 0 | Status: DISCONTINUED | OUTPATIENT
Start: 2023-09-20 | End: 2023-09-25

## 2023-09-20 RX ORDER — LIDOCAINE HCL 20 MG/ML
50 VIAL (ML) INJECTION ONCE
Refills: 0 | Status: DISCONTINUED | OUTPATIENT
Start: 2023-09-20 | End: 2023-09-20

## 2023-09-20 RX ORDER — PHENYLEPHRINE HYDROCHLORIDE 10 MG/ML
0.5 INJECTION INTRAVENOUS
Qty: 40 | Refills: 0 | Status: DISCONTINUED | OUTPATIENT
Start: 2023-09-20 | End: 2023-09-21

## 2023-09-20 RX ADMIN — Medication 3 MILLILITER(S): at 09:46

## 2023-09-20 RX ADMIN — PHENYLEPHRINE HYDROCHLORIDE 23.9 MICROGRAM(S)/KG/MIN: 10 INJECTION INTRAVENOUS at 08:17

## 2023-09-20 RX ADMIN — Medication 480 MICROGRAM(S): at 11:31

## 2023-09-20 RX ADMIN — ARGATROBAN 1.38 MICROGRAM(S)/KG/MIN: 50 INJECTION, SOLUTION INTRAVENOUS at 08:18

## 2023-09-20 RX ADMIN — HUMAN INSULIN 11 UNIT(S): 100 INJECTION, SUSPENSION SUBCUTANEOUS at 23:15

## 2023-09-20 RX ADMIN — Medication 1: at 17:05

## 2023-09-20 RX ADMIN — Medication 3 MILLILITER(S): at 04:37

## 2023-09-20 RX ADMIN — HUMAN INSULIN 11 UNIT(S): 100 INJECTION, SUSPENSION SUBCUTANEOUS at 00:38

## 2023-09-20 RX ADMIN — ARGATROBAN 1.38 MICROGRAM(S)/KG/MIN: 50 INJECTION, SOLUTION INTRAVENOUS at 19:53

## 2023-09-20 RX ADMIN — CHLORHEXIDINE GLUCONATE 15 MILLILITER(S): 213 SOLUTION TOPICAL at 05:07

## 2023-09-20 RX ADMIN — Medication 1: at 05:11

## 2023-09-20 RX ADMIN — Medication 200 GRAM(S): at 10:58

## 2023-09-20 RX ADMIN — CHLORHEXIDINE GLUCONATE 1 APPLICATION(S): 213 SOLUTION TOPICAL at 05:11

## 2023-09-20 RX ADMIN — DEXMEDETOMIDINE HYDROCHLORIDE IN 0.9% SODIUM CHLORIDE 6.38 MICROGRAM(S)/KG/HR: 4 INJECTION INTRAVENOUS at 08:18

## 2023-09-20 RX ADMIN — Medication 1 DROP(S): at 05:28

## 2023-09-20 RX ADMIN — SENNA PLUS 10 MILLILITER(S): 8.6 TABLET ORAL at 05:07

## 2023-09-20 RX ADMIN — PHENYLEPHRINE HYDROCHLORIDE 23.9 MICROGRAM(S)/KG/MIN: 10 INJECTION INTRAVENOUS at 19:52

## 2023-09-20 RX ADMIN — Medication 80 MILLIGRAM(S): at 05:13

## 2023-09-20 RX ADMIN — Medication 1: at 00:39

## 2023-09-20 RX ADMIN — PROPOFOL 20 MILLIGRAM(S): 10 INJECTION, EMULSION INTRAVENOUS at 14:10

## 2023-09-20 RX ADMIN — Medication 3 MILLILITER(S): at 16:34

## 2023-09-20 RX ADMIN — Medication 1 DROP(S): at 17:07

## 2023-09-20 RX ADMIN — MEROPENEM 100 MILLIGRAM(S): 1 INJECTION INTRAVENOUS at 05:10

## 2023-09-20 RX ADMIN — SENNA PLUS 10 MILLILITER(S): 8.6 TABLET ORAL at 17:05

## 2023-09-20 RX ADMIN — HUMAN INSULIN 11 UNIT(S): 100 INJECTION, SUSPENSION SUBCUTANEOUS at 17:05

## 2023-09-20 RX ADMIN — HUMAN INSULIN 11 UNIT(S): 100 INJECTION, SUSPENSION SUBCUTANEOUS at 05:12

## 2023-09-20 RX ADMIN — CHLORHEXIDINE GLUCONATE 15 MILLILITER(S): 213 SOLUTION TOPICAL at 17:05

## 2023-09-20 RX ADMIN — Medication 15 MILLILITER(S): at 11:34

## 2023-09-20 RX ADMIN — Medication 8 MILLILITER(S): at 14:10

## 2023-09-20 RX ADMIN — POLYETHYLENE GLYCOL 3350 17 GRAM(S): 17 POWDER, FOR SOLUTION ORAL at 23:15

## 2023-09-20 RX ADMIN — DEXMEDETOMIDINE HYDROCHLORIDE IN 0.9% SODIUM CHLORIDE 6.38 MICROGRAM(S)/KG/HR: 4 INJECTION INTRAVENOUS at 19:52

## 2023-09-20 RX ADMIN — PROPOFOL 20 MILLIGRAM(S): 10 INJECTION, EMULSION INTRAVENOUS at 14:06

## 2023-09-20 RX ADMIN — POLYETHYLENE GLYCOL 3350 17 GRAM(S): 17 POWDER, FOR SOLUTION ORAL at 13:19

## 2023-09-20 RX ADMIN — Medication 1: at 11:31

## 2023-09-20 RX ADMIN — Medication 3 MILLILITER(S): at 22:10

## 2023-09-20 RX ADMIN — Medication 12.5 MILLIGRAM(S): at 17:06

## 2023-09-20 RX ADMIN — MEROPENEM 100 MILLIGRAM(S): 1 INJECTION INTRAVENOUS at 13:18

## 2023-09-20 RX ADMIN — Medication 1 MILLIGRAM(S): at 11:30

## 2023-09-20 RX ADMIN — ATOVAQUONE 1500 MILLIGRAM(S): 750 SUSPENSION ORAL at 11:31

## 2023-09-20 RX ADMIN — HUMAN INSULIN 11 UNIT(S): 100 INJECTION, SUSPENSION SUBCUTANEOUS at 11:32

## 2023-09-20 RX ADMIN — PANTOPRAZOLE SODIUM 40 MILLIGRAM(S): 20 TABLET, DELAYED RELEASE ORAL at 11:30

## 2023-09-20 RX ADMIN — Medication 1: at 23:15

## 2023-09-20 NOTE — PROGRESS NOTE ADULT - SUBJECTIVE AND OBJECTIVE BOX
ALIZA SILVALLES  [id number]    INTERVAL HPI/OVERNIGHT EVENTS:    MEDICATIONS  (STANDING):  albuterol/ipratropium for Nebulization 3 milliLiter(s) Nebulizer every 6 hours  argatroban Infusion 0.18 MICROgram(s)/kG/Min (1.38 mL/Hr) IV Continuous <Continuous>  artificial  tears Solution 1 Drop(s) Both EYES every 12 hours  atovaquone  Suspension 1500 milliGRAM(s) Oral daily  chlorhexidine 0.12% Liquid 15 milliLiter(s) Oral Mucosa every 12 hours  chlorhexidine 2% Cloths 1 Application(s) Topical <User Schedule>  dexMEDEtomidine Infusion 0.2 MICROgram(s)/kG/Hr (6.38 mL/Hr) IV Continuous <Continuous>  dextrose 5%. 1000 milliLiter(s) (50 mL/Hr) IV Continuous <Continuous>  dextrose 50% Injectable 12.5 Gram(s) IV Push once  dextrose 50% Injectable 25 Gram(s) IV Push once  dextrose 50% Injectable 25 Gram(s) IV Push once  fentaNYL    Injectable 100 MICROGram(s) IV Push once  filgrastim-sndz (ZARXIO) Injectable 480 MICROGram(s) SubCutaneous daily  folic acid 1 milliGRAM(s) Oral daily  glucagon  Injectable 1 milliGRAM(s) IntraMuscular once  insulin lispro (ADMELOG) corrective regimen sliding scale   SubCutaneous every 6 hours  insulin NPH human recombinant 11 Unit(s) SubCutaneous every 6 hours  lidocaine 1% Injectable 50 milliLiter(s) Local Injection once  meropenem  IVPB 1000 milliGRAM(s) IV Intermittent every 8 hours  methylPREDNISolone sodium succinate Injectable 80 milliGRAM(s) IV Push daily  metoprolol tartrate 12.5 milliGRAM(s) Oral two times a day  multivitamin/minerals/iron Oral Solution (CENTRUM) 15 milliLiter(s) Oral daily  norepinephrine Infusion 0.3 MICROgram(s)/kG/Min (36.9 mL/Hr) IV Continuous <Continuous>  pantoprazole  Injectable 40 milliGRAM(s) IV Push daily  phenylephrine    Infusion 0.5 MICROgram(s)/kG/Min (23.9 mL/Hr) IV Continuous <Continuous>  polyethylene glycol 3350 17 Gram(s) Oral <User Schedule>  propofol Injectable 100 milliGRAM(s) IV Push once  senna Syrup 10 milliLiter(s) Oral two times a day    MEDICATIONS  (PRN):  dextrose Oral Gel 15 Gram(s) Oral once PRN Blood Glucose LESS THAN 70 milliGRAM(s)/deciliter  diphenhydrAMINE Injectable 50 milliGRAM(s) IV Push once PRN chemotherapy reaction  meperidine     Injectable 25 milliGRAM(s) IV Push once PRN chemotherapy reaction      Allergies    No Known Allergies    Intolerances        Review of Systems:  Gen:  No fevers/chills, weight loss, alopecia  HEENT: No blurry vision, dry eyes, dry mouth, no difficulty swallowing, no oral or nasal ulcers  CVS: No chest pain    Resp: No SOB  GI: No N/V/C/D/abdominal pain, hematochezia  : No hematuria or dysuria  MSK: No joint pain or muscle weakness  Skin: No rash  Neuro: No headaches    Vital Signs Last 24 Hrs  T(C): 35.9 (20 Sep 2023 12:00), Max: 36.1 (20 Sep 2023 04:00)  T(F): 96.7 (20 Sep 2023 12:00), Max: 96.9 (20 Sep 2023 04:00)  HR: 112 (20 Sep 2023 15:00) (99 - 191)  BP: 156/88 (20 Sep 2023 08:00) (156/88 - 156/88)  BP(mean): 104 (20 Sep 2023 08:00) (104 - 104)  RR: 13 (20 Sep 2023 15:00) (12 - 35)  SpO2: 98% (20 Sep 2023 15:00) (95% - 100%)    Parameters below as of 20 Sep 2023 15:00  Patient On (Oxygen Delivery Method): ventilator, P-AC PS 27 RATE 12    O2 Concentration (%): 40    Physical Exam:  General: Breathing comfortably on room air, no distress  HEENT: EOMI, MMM, no oral ulcers  CVS: +S1/S2, RRR  Resp: CTA b/l. No crackles/wheezing  GI: Soft, NT/ND +BS  MSK: 5/5 muscle strength in arms and legs. B/l shoulder, elbow, wrist, MCP/PIP/DIP, Knee, ankle w/o swelling/erythema/or tenderness  Skin: no visible rashes    LABS:                        11.6   1.36  )-----------( 31       ( 20 Sep 2023 09:00 )             35.9     09-20    140  |  102  |  44<H>  ----------------------------<  221<H>  5.0   |  29  |  0.33<L>    Ca    9.3      20 Sep 2023 09:00  Phos  3.1     09-20  Mg     2.30     09-20    TPro  5.0<L>  /  Alb  3.3  /  TBili  14.1<H>  /  DBili  x   /  AST  86<H>  /  ALT  292<H>  /  AlkPhos  243<H>  09-20    PT/INR - ( 20 Sep 2023 04:46 )   PT: 18.0 sec;   INR: 1.63 ratio         PTT - ( 20 Sep 2023 09:00 )  PTT:53.3 sec  Urinalysis Basic - ( 20 Sep 2023 09:00 )    Color: x / Appearance: x / SG: x / pH: x  Gluc: 221 mg/dL / Ketone: x  / Bili: x / Urobili: x   Blood: x / Protein: x / Nitrite: x   Leuk Esterase: x / RBC: x / WBC x   Sq Epi: x / Non Sq Epi: x / Bacteria: x       ALIZA FOLEY  [id number]    INTERVAL HPI/OVERNIGHT EVENTS: Pt remains intubated, somewhat alert and able to squeeze hand on command.      MEDICATIONS  (STANDING):  albuterol/ipratropium for Nebulization 3 milliLiter(s) Nebulizer every 6 hours  argatroban Infusion 0.18 MICROgram(s)/kG/Min (1.38 mL/Hr) IV Continuous <Continuous>  artificial  tears Solution 1 Drop(s) Both EYES every 12 hours  atovaquone  Suspension 1500 milliGRAM(s) Oral daily  chlorhexidine 0.12% Liquid 15 milliLiter(s) Oral Mucosa every 12 hours  chlorhexidine 2% Cloths 1 Application(s) Topical <User Schedule>  dexMEDEtomidine Infusion 0.2 MICROgram(s)/kG/Hr (6.38 mL/Hr) IV Continuous <Continuous>  dextrose 5%. 1000 milliLiter(s) (50 mL/Hr) IV Continuous <Continuous>  dextrose 50% Injectable 12.5 Gram(s) IV Push once  dextrose 50% Injectable 25 Gram(s) IV Push once  dextrose 50% Injectable 25 Gram(s) IV Push once  fentaNYL    Injectable 100 MICROGram(s) IV Push once  filgrastim-sndz (ZARXIO) Injectable 480 MICROGram(s) SubCutaneous daily  folic acid 1 milliGRAM(s) Oral daily  glucagon  Injectable 1 milliGRAM(s) IntraMuscular once  insulin lispro (ADMELOG) corrective regimen sliding scale   SubCutaneous every 6 hours  insulin NPH human recombinant 11 Unit(s) SubCutaneous every 6 hours  lidocaine 1% Injectable 50 milliLiter(s) Local Injection once  meropenem  IVPB 1000 milliGRAM(s) IV Intermittent every 8 hours  methylPREDNISolone sodium succinate Injectable 80 milliGRAM(s) IV Push daily  metoprolol tartrate 12.5 milliGRAM(s) Oral two times a day  multivitamin/minerals/iron Oral Solution (CENTRUM) 15 milliLiter(s) Oral daily  norepinephrine Infusion 0.3 MICROgram(s)/kG/Min (36.9 mL/Hr) IV Continuous <Continuous>  pantoprazole  Injectable 40 milliGRAM(s) IV Push daily  phenylephrine    Infusion 0.5 MICROgram(s)/kG/Min (23.9 mL/Hr) IV Continuous <Continuous>  polyethylene glycol 3350 17 Gram(s) Oral <User Schedule>  propofol Injectable 100 milliGRAM(s) IV Push once  senna Syrup 10 milliLiter(s) Oral two times a day    MEDICATIONS  (PRN):  dextrose Oral Gel 15 Gram(s) Oral once PRN Blood Glucose LESS THAN 70 milliGRAM(s)/deciliter  diphenhydrAMINE Injectable 50 milliGRAM(s) IV Push once PRN chemotherapy reaction  meperidine     Injectable 25 milliGRAM(s) IV Push once PRN chemotherapy reaction      Allergies    No Known Allergies    Intolerances        Review of Systems: unable to obtain as intubated    Vital Signs Last 24 Hrs  T(C): 35.9 (20 Sep 2023 12:00), Max: 36.1 (20 Sep 2023 04:00)  T(F): 96.7 (20 Sep 2023 12:00), Max: 96.9 (20 Sep 2023 04:00)  HR: 112 (20 Sep 2023 15:00) (99 - 191)  BP: 156/88 (20 Sep 2023 08:00) (156/88 - 156/88)  BP(mean): 104 (20 Sep 2023 08:00) (104 - 104)  RR: 13 (20 Sep 2023 15:00) (12 - 35)  SpO2: 98% (20 Sep 2023 15:00) (95% - 100%)    Parameters below as of 20 Sep 2023 15:00  Patient On (Oxygen Delivery Method): ventilator, P-AC PS 27 RATE 12    O2 Concentration (%): 40    Physical Exam:  General: intubated, somewhat alert and able to squeeze hand on command.    HEENT: +ET tube  CVS: +S1/S2, tachycardic  Resp: mechanical breath sounds  GI: Soft   MSK: no synovitis  Skin: areas of ecchymosis, no rash, +lymphedema in b/l legs    LABS:                        11.6   1.36  )-----------( 31       ( 20 Sep 2023 09:00 )             35.9     09-20    140  |  102  |  44<H>  ----------------------------<  221<H>  5.0   |  29  |  0.33<L>    Ca    9.3      20 Sep 2023 09:00  Phos  3.1     09-20  Mg     2.30     09-20    TPro  5.0<L>  /  Alb  3.3  /  TBili  14.1<H>  /  DBili  x   /  AST  86<H>  /  ALT  292<H>  /  AlkPhos  243<H>  09-20    PT/INR - ( 20 Sep 2023 04:46 )   PT: 18.0 sec;   INR: 1.63 ratio         PTT - ( 20 Sep 2023 09:00 )  PTT:53.3 sec  Urinalysis Basic - ( 20 Sep 2023 09:00 )    Color: x / Appearance: x / SG: x / pH: x  Gluc: 221 mg/dL / Ketone: x  / Bili: x / Urobili: x   Blood: x / Protein: x / Nitrite: x   Leuk Esterase: x / RBC: x / WBC x   Sq Epi: x / Non Sq Epi: x / Bacteria: x

## 2023-09-20 NOTE — PROGRESS NOTE ADULT - ASSESSMENT
62 year old with progressive dyspnea and hypoxia that has progressed over months.  She has some associated rash and arthralgia.    Suspected Mixed connective tissue disorder/SLE    She had progressive hypoxic respiratory failure  She was intubated  Now on VV ECMO.  Now with neutropenia    I would continue mepron for prophylaxis  Can continue meropenem pending count recovery (ANC>1000)

## 2023-09-20 NOTE — CHART NOTE - NSCHARTNOTEFT_GEN_A_CORE
:  Enzo Bolaños     INDICATION: ECMO     PROCEDURE:   [x] LIMITED ECHO  [x] LIMITED CHEST  [ ] LIMITED RETROPERITONEAL  [ ] LIMITED ABDOMINAL  [ ] LIMITED DVT  [ ] NEEDLE GUIDANCE VASCULAR  [ ] NEEDLE GUIDANCE THORACENTESIS  [ ] NEEDLE GUIDANCE PARACENTESIS  [ ] NEEDLE GUIDANCE PERICARDIOCENTESIS  [ ] OTHER     FINDINGS:   Lung:  bilateral apices with scattered B lines    Cardiac:  normal appearing LV function  LVOT VTI range from 8-18 (patient in AFib) - average ~13cm  LV >RV, TAPSE ~1.6     INTERPRETATION:   Improving biventricular function albeit i/s/o AFib  improving aeration of bilateral apices of lungs      Images not stored in [employer] (due to machine storage problems)    Supervised by Dr. Hawley    Should not be considered final until signed by attending.

## 2023-09-20 NOTE — CHART NOTE - NSCHARTNOTEFT_GEN_A_CORE
NUTRITION FOLLOW-UP      61yo F with ARDS, intubated/sedated & cannulated (9/13). Transferred to [facility] for VV-ECMO.    Pt. with possible MCTD vs SLE.  Currently neutropenic.  Receiving PLEX at time of RDN encounter.  Remains on VV-ECMO and intubated.     Spoke with RN.  Loose BM (9/20)... on bowel regimen. Abx also perhaps somewhat contributory.  No vomiting noted/reported.  No abdominal distention.     Consistently receiving VitalHP @ prevously recommended goal.  However, Pt. no longer sedated with Propofol.  Although adequately meeting estimated protein needs on current TF regimen, inadequately meeting estimated kcal needs.  Hence advise discontinuation of VitalHP formula and changing to Pivot 1.5 with goal of 40mL/hr.  Prosource supplementation remains warranted.... 3 packets per day.  Can continue multivitamin for micronutrient coverage.    Glucose levels elevated likely related to corticosteroid use.  Adjust insulin once TF changes, as/if medically warranted.        _________________Diet___________________  Diet, NPO with Tube Feed:   Tube Feeding Modality: Nasogastric  Vital High Protein (VITALHP)  Continuous  Starting Tube Feed Rate {mL per Hour}: 10  Increase Tube Feed Rate by (mL): 10     Every 4 hours  Until Goal Tube Feed Rate (mL per Hour): 35  Tube Feed Duration (in Hours): 24  Tube Feed Start Time: 10:00  No Carb Prosource (1pkg = 15gms Protein)     Qty per Day:  4 (09-14-23 @ 10:39) [Active]    provides:  840mL total volume  840 kcals  73g protein (+ 60g additional protein via NoCarb Prosource)= 133g total protein   702mL free water         Weight:                    Height:  68"              Upper 10% Ideal Body Weight: 154lbs / 69.8kg  133.9kg (9/20)  131.6kg (9/17)  127.6kg (9/13 on admit)            _____Estimated Energy Needs_____  11-14 kcals/kg admit weight = 0182-7606 kcals/d  1.8-2.0g protein/kg Ideal Body Weight = 126-140g protein/d      Edema: 2+ b/l arms/hands/legs/feet  Skin: No pressure injuries         ________________________Pertinent Medications____________   MEDICATIONS  (STANDING):  albuterol/ipratropium for Nebulization 3 milliLiter(s) Nebulizer every 6 hours  argatroban Infusion 0.18 MICROgram(s)/kG/Min (1.38 mL/Hr) IV Continuous <Continuous>  artificial  tears Solution 1 Drop(s) Both EYES every 12 hours  atovaquone  Suspension 1500 milliGRAM(s) Oral daily  chlorhexidine 0.12% Liquid 15 milliLiter(s) Oral Mucosa every 12 hours  chlorhexidine 2% Cloths 1 Application(s) Topical <User Schedule>  dexMEDEtomidine Infusion 0.2 MICROgram(s)/kG/Hr (6.38 mL/Hr) IV Continuous <Continuous>  dextrose 5%. 1000 milliLiter(s) (50 mL/Hr) IV Continuous <Continuous>  dextrose 50% Injectable 12.5 Gram(s) IV Push once  dextrose 50% Injectable 25 Gram(s) IV Push once  dextrose 50% Injectable 25 Gram(s) IV Push once  filgrastim-sndz (ZARXIO) Injectable 480 MICROGram(s) SubCutaneous daily  folic acid 1 milliGRAM(s) Oral daily  glucagon  Injectable 1 milliGRAM(s) IntraMuscular once  insulin lispro (ADMELOG) corrective regimen sliding scale   SubCutaneous every 6 hours  insulin NPH human recombinant 11 Unit(s) SubCutaneous every 6 hours  meropenem  IVPB 1000 milliGRAM(s) IV Intermittent every 8 hours  methylPREDNISolone sodium succinate Injectable 80 milliGRAM(s) IV Push daily  multivitamin/minerals/iron Oral Solution (CENTRUM) 15 milliLiter(s) Oral daily  naloxegol 25 milliGRAM(s) Oral daily  norepinephrine Infusion 0.3 MICROgram(s)/kG/Min (36.9 mL/Hr) IV Continuous <Continuous>  pantoprazole  Injectable 40 milliGRAM(s) IV Push daily  phenylephrine    Infusion 0.5 MICROgram(s)/kG/Min (23.9 mL/Hr) IV Continuous <Continuous>  polyethylene glycol 3350 17 Gram(s) Oral <User Schedule>  senna Syrup 10 milliLiter(s) Oral two times a day    MEDICATIONS  (PRN):  dextrose Oral Gel 15 Gram(s) Oral once PRN Blood Glucose LESS THAN 70 milliGRAM(s)/deciliter  diphenhydrAMINE Injectable 50 milliGRAM(s) IV Push once PRN chemotherapy reaction  meperidine     Injectable 25 milliGRAM(s) IV Push once PRN chemotherapy reaction          _________________________Pertinent Labs____________________     09-20    140  |  102  |  44<H>  ----------------------------<  221<H>  5.0   |  29  |  0.33<L>    Ca    9.3      20 Sep 2023 09:00  Phos  3.1     09-20  Mg     2.30     09-20    TPro  5.0<L>  /  Alb  3.3  /  TBili  14.1<H>  /  DBili  x   /  AST  86<H>  /  ALT  292<H>  /  AlkPhos  243<H>  09-20                                                                   11.6   1.36  )-----------( 31       ( 20 Sep 2023 09:00 )             35.9         CAPILLARY BLOOD GLUCOSE      POCT Blood Glucose.: 182 mg/dL (20 Sep 2023 11:25)    POCT Blood Glucose.: 182 mg/dL (09-20-23 @ 11:25)  POCT Blood Glucose.: 161 mg/dL (09-20-23 @ 05:00)  POCT Blood Glucose.: 189 mg/dL (09-20-23 @ 00:37)  POCT Blood Glucose.: 215 mg/dL (09-19-23 @ 23:27)  POCT Blood Glucose.: 164 mg/dL (09-19-23 @ 17:50)  POCT Blood Glucose.: 157 mg/dL (09-19-23 @ 14:04)    Triglycerides, Serum: 157 mg/dL (09.20.23 @ 04:46)        PLAN/RECOMMENDATIONS:    1)  D/C VitalHP.  Initiate Pivot 1.5 @ 30mL/hr then increase by 10mL in 4hrs, as tolerated, to goal of 40mL/hr x 24hrs + 3 packets NoCarb Prosource per day      provides:  960mL total volume  1440 kcals  90g protein (+45 g additional protein via NoCarb Prosource)= 135g total protein   720mL free water     2) Obtain daily weights  3) Monitor tolerance to TF, GI status, electrolytes, glucose     RDN remains available and will f/u PRN.          Marjorie Kidd, RDN, CDN       pager [phone] or MS Teams

## 2023-09-20 NOTE — PROGRESS NOTE ADULT - SUBJECTIVE AND OBJECTIVE BOX
MICU Progress Note  ---Enzo Bolaños MD, PGY4  ---------------------------------------------------------------------------------------------    SUBJECTIVE  INTERVAL EVENTS: Down on FiO2, FdO2, and sweep  OVERNIGHT EVENTS:  ADDITIONAL REVIEW OF SYSTEMS:  [ ] Unable to assess due to    OBJECTIVE  Physical Exam:     CONST:               Ill-appearing. Overall worsening/improving/no change since yesterday.  EYES:                   conjunctiva and sclera clear; PERRL; no anisicoria; ocular lubricant noted.  RESP/CHEST:      Ventilator sounds symmetric bilaterally. No wheezing. No chest wall crepitus.  CARDIOVASC:     RRR, normal S1/S2, no M/R/G; no appreciable JVD  ABDOMEN:         Soft, NT/ND, no organomegaly noted  EXT:                     Warm/Cool to touch. Anasarca present/absent. Carolin's negative. Cap refill <2s; pulses are 2+ B/L  NEURO:               Sedated to RASS __. (Paralyzed).  No spont mvmts. Withdraws to pain x4. no lamb's  SKIN:                   No diffuse rashes; no pressure injury noted; no mottling    TUBES/LINES/DRAINS:  [] ETT  [] NGT/OGT  [] Peripheral IV  [] Central Venous Line     	[] R	[] L	[] IJ	[] Fem	[] SC	Date Placed:   [] Arterial Line		[] R	[] L	[] Fem	[] Rad	[] Ax	Date Placed:   [] PICC:         	[] Midline		[] Mediport  [] Urinary Catheter		Date Placed:     ICU Vital Signs Last 24 Hrs  T(F): 96.9 (20 Sep 2023 04:00), Max: 96.9 (20 Sep 2023 04:00)  HR: 119 (20 Sep 2023 07:28) (76 - 142)  BP: --  BP(mean): --  ABP: 129/91 (20 Sep 2023 07:00) (61/40 - 188/122)  ABP(mean): 109 (20 Sep 2023 07:00) (46 - 151)  RR: 22 (20 Sep 2023 07:00) (12 - 35)  SpO2: 100% (20 Sep 2023 07:28) (92% - 100%)    I&O's Summary    19 Sep 2023 07:01  -  20 Sep 2023 07:00  --------------------------------------------------------  IN: 4421.7 mL / OUT: 3850 mL / NET: 571.7 mL      Mode: AC/ CMV (Assist Control/ Continuous Mandatory Ventilation)  RR (machine): 12  FiO2: 50  PEEP: 12  MAP: 15  PC: 15  PIP: 28    ECMO Day#     Adult Advanced Hemodynamics Last 24 Hrs  CVP(mm Hg): --  CVP(cm H2O): --  CO: --  CI: --  PA: --  PA(mean): --  PCWP: --  SVR: --  SVRI: --  PVR: --  PVRI: --    ECMO SETTINGS:  Type:		[ ] Venovenous		[ ] Venoarterial  Cannulation Site(s):     Flow:  	        RPM: 		    Oxygenator:	Sweep:     L/min	FiO2:        LABS:                        11.2   1.03  )-----------( 33       ( 20 Sep 2023 04:46 )             34.9     09-20    144  |  105  |  45<H>  ----------------------------<  175<H>  4.7   |  32<H>  |  0.37<L>    Ca    8.9      20 Sep 2023 04:46  Phos  2.9     09-20  Mg     2.40     09-20    TPro  4.6<L>  /  Alb  3.1<L>  /  TBili  12.6<H>  /  DBili  x   /  AST  80<H>  /  ALT  313<H>  /  AlkPhos  201<H>  09-20    PT/INR - ( 20 Sep 2023 04:46 )   PT: 18.0 sec;   INR: 1.63 ratio         PTT - ( 20 Sep 2023 04:46 )  PTT:56.3 sec  ABG - ( 20 Sep 2023 04:46 )  pH, Arterial: 7.46  pH, Blood: x     /  pCO2: 49    /  pO2: 158   / HCO3: 35    / Base Excess: 9.6   /  SaO2: 99.5                  Urinalysis Basic - ( 20 Sep 2023 04:46 )    Color: x / Appearance: x / SG: x / pH: x  Gluc: 175 mg/dL / Ketone: x  / Bili: x / Urobili: x   Blood: x / Protein: x / Nitrite: x   Leuk Esterase: x / RBC: x / WBC x   Sq Epi: x / Non Sq Epi: x / Bacteria: x          CAPILLARY BLOOD GLUCOSE      POCT Blood Glucose.: 161 mg/dL (20 Sep 2023 05:00)  POCT Blood Glucose.: 189 mg/dL (20 Sep 2023 00:37)  POCT Blood Glucose.: 215 mg/dL (19 Sep 2023 23:27)  POCT Blood Glucose.: 164 mg/dL (19 Sep 2023 17:50)  POCT Blood Glucose.: 157 mg/dL (19 Sep 2023 14:04)  POCT Blood Glucose.: 189 mg/dL (19 Sep 2023 13:01)  POCT Blood Glucose.: 149 mg/dL (19 Sep 2023 12:09)  POCT Blood Glucose.: 225 mg/dL (19 Sep 2023 11:12)  POCT Blood Glucose.: 229 mg/dL (19 Sep 2023 10:27)  POCT Blood Glucose.: 205 mg/dL (19 Sep 2023 09:15)      Culture - Sputum (collected 19 Sep 2023 10:40)  Source: ET Tube ET Tube  Gram Stain (19 Sep 2023 23:20):    Few polymorphonuclear leukocytes per low power field    Few Squamous epithelial cells per low power field    Moderate Yeast per oil power field    Moderate Gram Positive Cocci in Clusters per oil power field        RADIOLOGY & ADDITIONAL TESTS:  Results Reviewed:   Imaging Personally Reviewed:  Electrocardiogram Personally Reviewed:    Medications and Allergies:   MEDICATIONS  (STANDING):  albuterol/ipratropium for Nebulization 3 milliLiter(s) Nebulizer every 6 hours  argatroban Infusion 0.18 MICROgram(s)/kG/Min (1.38 mL/Hr) IV Continuous <Continuous>  artificial  tears Solution 1 Drop(s) Both EYES every 12 hours  atovaquone  Suspension 1500 milliGRAM(s) Oral daily  chlorhexidine 0.12% Liquid 15 milliLiter(s) Oral Mucosa every 12 hours  chlorhexidine 2% Cloths 1 Application(s) Topical <User Schedule>  dexMEDEtomidine Infusion 0.2 MICROgram(s)/kG/Hr (6.38 mL/Hr) IV Continuous <Continuous>  dextrose 5%. 1000 milliLiter(s) (50 mL/Hr) IV Continuous <Continuous>  dextrose 50% Injectable 25 Gram(s) IV Push once  dextrose 50% Injectable 12.5 Gram(s) IV Push once  dextrose 50% Injectable 25 Gram(s) IV Push once  filgrastim-sndz (ZARXIO) Injectable 480 MICROGram(s) SubCutaneous daily  folic acid 1 milliGRAM(s) Oral daily  glucagon  Injectable 1 milliGRAM(s) IntraMuscular once  insulin lispro (ADMELOG) corrective regimen sliding scale   SubCutaneous every 6 hours  insulin NPH human recombinant 11 Unit(s) SubCutaneous every 6 hours  meropenem  IVPB      meropenem  IVPB 1000 milliGRAM(s) IV Intermittent every 8 hours  methylPREDNISolone sodium succinate Injectable 80 milliGRAM(s) IV Push daily  metoprolol tartrate 25 milliGRAM(s) Oral two times a day  multivitamin/minerals/iron Oral Solution (CENTRUM) 15 milliLiter(s) Oral daily  naloxegol 25 milliGRAM(s) Oral daily  norepinephrine Infusion 0.3 MICROgram(s)/kG/Min (36.9 mL/Hr) IV Continuous <Continuous>  pantoprazole  Injectable 40 milliGRAM(s) IV Push daily  phenylephrine    Infusion 0.5 MICROgram(s)/kG/Min (23.9 mL/Hr) IV Continuous <Continuous>  polyethylene glycol 3350 17 Gram(s) Oral <User Schedule>  senna Syrup 10 milliLiter(s) Oral two times a day    MEDICATIONS  (PRN):  dextrose Oral Gel 15 Gram(s) Oral once PRN Blood Glucose LESS THAN 70 milliGRAM(s)/deciliter  diphenhydrAMINE Injectable 50 milliGRAM(s) IV Push once PRN chemotherapy reaction  meperidine     Injectable 25 milliGRAM(s) IV Push once PRN chemotherapy reaction      Antimicrobials  atovaquone  Suspension 1500 milliGRAM(s) Oral daily  meropenem  IVPB      meropenem  IVPB 1000 milliGRAM(s) IV Intermittent every 8 hours, Stop order after: 7 Days    azithromycin  IVPB 500 milliGRAM(s) IV Intermittent once, 09-13-23 @ 17:49, STAT  meropenem  IVPB 1000 milliGRAM(s) IV Intermittent once, 09-13-23 @ 17:49, STAT, Stop order after: 1 Doses  vancomycin  IVPB 2500 milliGRAM(s) IV Intermittent once, 09-14-23 @ 07:40, , Stop order after: 1 Doses  vancomycin  IVPB 1750 milliGRAM(s) IV Intermittent once, 09-18-23 @ 13:41, STAT, Stop order after: 1 Doses    azithromycin  IVPB    , 09-13-23 @ 17:49,   azithromycin  IVPB 500 milliGRAM(s) IV Intermittent every 24 hours, 09-14-23 @ 17:49,   trimethoprim  40 mG/sulfamethoxazole 200 mG Suspension 160 milliGRAM(s) Oral daily, 09-13-23 @ 18:19, Routine  trimethoprim  40 mG/sulfamethoxazole 200 mG Suspension 160 milliGRAM(s) Oral daily, 09-13-23 @ 18:21,   vancomycin  IVPB 1750 milliGRAM(s) IV Intermittent every 12 hours, 09-14-23 @ 20:00, 20:00, Stop order after: 7 Days  vancomycin  IVPB 1500 milliGRAM(s) IV Intermittent every 12 hours, 09-14-23 @ 20:00, 20:00, Stop order after: 7 Days  vancomycin  IVPB 2000 milliGRAM(s) IV Intermittent every 12 hours, 09-14-23 @ 07:13, Routine, Stop order after: 7 Days  vancomycin  IVPB    , 09-18-23 @ 14:56,   vancomycin  IVPB 1750 milliGRAM(s) IV Intermittent every 12 hours, 09-19-23 @ 06:00, , Stop order after: 7 Days    atovaquone  Suspension 1500 milliGRAM(s) Oral daily  meropenem  IVPB      meropenem  IVPB 1000 milliGRAM(s) IV Intermittent every 8 hours, Stop order after: 7 Days      Allergies    No Known Allergies    Intolerances     MICU Progress Note  ---Enzo Bolaños MD, PGY4  ---------------------------------------------------------------------------------------------    SUBJECTIVE  INTERVAL EVENTS: Down on FiO2, FdO2, and sweep  OVERNIGHT EVENTS: had a few episodes of hypotension requiring going back on marianne  ADDITIONAL REVIEW OF SYSTEMS:  [x] Unable to assess due to sedation    OBJECTIVE  Physical Exam:     CONST:               Ill-appearing. Overall improving since yesterday.  EYES:                   conjunctiva and sclera clear; PERRL; no anisicoria; ocular lubricant noted.  RESP/CHEST:       Ventilator sounds symmetric bilaterally. No wheezing. No chest wall crepitus.  CARDIOVASC:      RRR, normal S1/S2, no M/R/G; no appreciable JVD  ABDOMEN:         Soft, NT/ND, no organomegaly noted  EXT:                   Warm to touch. Anasarca present. Cap refill <2s; pulses are 2+ B/L  NEURO:               Sedated to RASS -1. + spont mvmts. Withdraws to pain x4.  SKIN:                   No diffuse rashes; no pressure injury noted; no mottling    TUBES/LINES/DRAINS:  [x] ETT  [x] NGT/OGT  [x] Peripheral IV  [x] Central Venous Line     	[] R	[x] L	[x] IJ	[] Fem	[] SC	Date Placed: 9/13/23  [x] ECMO inflow                           [x] R      [] L         [x] Fem             Date Placed 9/13/23   [x] ECMO outflow                         [x] R	[] L	[x] IJ	[] Fem	Date Placed: 9/13/23  [x] Arterial Line		[] R	[x] L	[] Fem	[] Rad	[x] Ax	Date Placed: 9/13/23  [] PICC:         	[] Midline		[] Mediport  [] Urinary Catheter		Date Placed:     ICU Vital Signs Last 24 Hrs  T(F): 96.9 (20 Sep 2023 04:00), Max: 96.9 (20 Sep 2023 04:00)  HR: 119 (20 Sep 2023 07:28) (76 - 142)  BP: --  BP(mean): --  ABP: 129/91 (20 Sep 2023 07:00) (61/40 - 188/122)  ABP(mean): 109 (20 Sep 2023 07:00) (46 - 151)  RR: 22 (20 Sep 2023 07:00) (12 - 35)  SpO2: 100% (20 Sep 2023 07:28) (92% - 100%)    I&O's Summary    19 Sep 2023 07:01  -  20 Sep 2023 07:00  --------------------------------------------------------  IN: 4421.7 mL / OUT: 3850 mL / NET: 571.7 mL      Mode: AC/ CMV (Assist Control/ Continuous Mandatory Ventilation)  RR (machine): 12  FiO2: 50  PEEP: 12  MAP: 15  PC: 15  PIP: 28    ECMO Day# 7    Adult Advanced Hemodynamics Last 24 Hrs  CVP(mm Hg): --  CVP(cm H2O): --  CO: --  CI: --  PA: --  PA(mean): --  PCWP: --  SVR: --  SVRI: --  PVR: --  PVRI: --    ECMO SETTINGS:  Type:		[x ] Venovenous		[ ] Venoarterial  Cannulation Site(s): R Fem, RIJ    Flow: 3.75 	        RPM: 2750		    Oxygenator:	Sweep:  0.5   L/min	FiO2:    0.5  Pressures:  Pre-Membrane (mm/Hg):  164 (20 Sep 2023 08:00)     Post-Membrane (mm/Hg):  137 (20 Sep 2023 08:00)  Pre-membrane VBG - 20 Sep 2023 04:46  pH: 7.45  / pCO2: 48    /  pO2: 50    /  HCO3: 33    /   Base Excess: 8.3   / SvO2: 81.0   Post-Membrane ABG - ( 20 Sep 2023 04:46 )  pH: 7.43  /  pCO2: 54    / pO2: 103   /  HCO3: 36    /  Base Excess: 9.6   /  SaO2: 97.5     LABS:                        11.2   1.03  )-----------( 33       ( 20 Sep 2023 04:46 )             34.9     09-20    144  |  105  |  45<H>  ----------------------------<  175<H>  4.7   |  32<H>  |  0.37<L>    Ca    8.9      20 Sep 2023 04:46  Phos  2.9     09-20  Mg     2.40     09-20    TPro  4.6<L>  /  Alb  3.1<L>  /  TBili  12.6<H>  /  DBili  x   /  AST  80<H>  /  ALT  313<H>  /  AlkPhos  201<H>  09-20    PT/INR - ( 20 Sep 2023 04:46 )   PT: 18.0 sec;   INR: 1.63 ratio         PTT - ( 20 Sep 2023 04:46 )  PTT:56.3 sec  ABG - ( 20 Sep 2023 04:46 )  pH, Arterial: 7.46  pH, Blood: x     /  pCO2: 49    /  pO2: 158   / HCO3: 35    / Base Excess: 9.6   /  SaO2: 99.5    --FdO2 50%, FiO2 50%              Urinalysis Basic - ( 20 Sep 2023 04:46 )    Color: x / Appearance: x / SG: x / pH: x  Gluc: 175 mg/dL / Ketone: x  / Bili: x / Urobili: x   Blood: x / Protein: x / Nitrite: x   Leuk Esterase: x / RBC: x / WBC x   Sq Epi: x / Non Sq Epi: x / Bacteria: x          CAPILLARY BLOOD GLUCOSE      POCT Blood Glucose.: 161 mg/dL (20 Sep 2023 05:00)  POCT Blood Glucose.: 189 mg/dL (20 Sep 2023 00:37)  POCT Blood Glucose.: 215 mg/dL (19 Sep 2023 23:27)  POCT Blood Glucose.: 164 mg/dL (19 Sep 2023 17:50)  POCT Blood Glucose.: 157 mg/dL (19 Sep 2023 14:04)  POCT Blood Glucose.: 189 mg/dL (19 Sep 2023 13:01)  POCT Blood Glucose.: 149 mg/dL (19 Sep 2023 12:09)  POCT Blood Glucose.: 225 mg/dL (19 Sep 2023 11:12)  POCT Blood Glucose.: 229 mg/dL (19 Sep 2023 10:27)  POCT Blood Glucose.: 205 mg/dL (19 Sep 2023 09:15)      Culture - Sputum (collected 19 Sep 2023 10:40)  Source: ET Tube ET Tube  Gram Stain (19 Sep 2023 23:20):    Few polymorphonuclear leukocytes per low power field    Few Squamous epithelial cells per low power field    Moderate Yeast per oil power field    Moderate Gram Positive Cocci in Clusters per oil power field        RADIOLOGY & ADDITIONAL TESTS:  Results Reviewed:   Imaging Personally Reviewed:  Electrocardiogram Personally Reviewed:    Medications and Allergies:   MEDICATIONS  (STANDING):  albuterol/ipratropium for Nebulization 3 milliLiter(s) Nebulizer every 6 hours  argatroban Infusion 0.18 MICROgram(s)/kG/Min (1.38 mL/Hr) IV Continuous <Continuous>  artificial  tears Solution 1 Drop(s) Both EYES every 12 hours  atovaquone  Suspension 1500 milliGRAM(s) Oral daily  chlorhexidine 0.12% Liquid 15 milliLiter(s) Oral Mucosa every 12 hours  chlorhexidine 2% Cloths 1 Application(s) Topical <User Schedule>  dexMEDEtomidine Infusion 0.2 MICROgram(s)/kG/Hr (6.38 mL/Hr) IV Continuous <Continuous>  dextrose 5%. 1000 milliLiter(s) (50 mL/Hr) IV Continuous <Continuous>  dextrose 50% Injectable 25 Gram(s) IV Push once  dextrose 50% Injectable 12.5 Gram(s) IV Push once  dextrose 50% Injectable 25 Gram(s) IV Push once  filgrastim-sndz (ZARXIO) Injectable 480 MICROGram(s) SubCutaneous daily  folic acid 1 milliGRAM(s) Oral daily  glucagon  Injectable 1 milliGRAM(s) IntraMuscular once  insulin lispro (ADMELOG) corrective regimen sliding scale   SubCutaneous every 6 hours  insulin NPH human recombinant 11 Unit(s) SubCutaneous every 6 hours  meropenem  IVPB      meropenem  IVPB 1000 milliGRAM(s) IV Intermittent every 8 hours  methylPREDNISolone sodium succinate Injectable 80 milliGRAM(s) IV Push daily  metoprolol tartrate 25 milliGRAM(s) Oral two times a day  multivitamin/minerals/iron Oral Solution (CENTRUM) 15 milliLiter(s) Oral daily  naloxegol 25 milliGRAM(s) Oral daily  norepinephrine Infusion 0.3 MICROgram(s)/kG/Min (36.9 mL/Hr) IV Continuous <Continuous>  pantoprazole  Injectable 40 milliGRAM(s) IV Push daily  phenylephrine    Infusion 0.5 MICROgram(s)/kG/Min (23.9 mL/Hr) IV Continuous <Continuous>  polyethylene glycol 3350 17 Gram(s) Oral <User Schedule>  senna Syrup 10 milliLiter(s) Oral two times a day    MEDICATIONS  (PRN):  dextrose Oral Gel 15 Gram(s) Oral once PRN Blood Glucose LESS THAN 70 milliGRAM(s)/deciliter  diphenhydrAMINE Injectable 50 milliGRAM(s) IV Push once PRN chemotherapy reaction  meperidine     Injectable 25 milliGRAM(s) IV Push once PRN chemotherapy reaction      Antimicrobials  atovaquone  Suspension 1500 milliGRAM(s) Oral daily  meropenem  IVPB      meropenem  IVPB 1000 milliGRAM(s) IV Intermittent every 8 hours, Stop order after: 7 Days    azithromycin  IVPB 500 milliGRAM(s) IV Intermittent once, 09-13-23 @ 17:49, STAT  meropenem  IVPB 1000 milliGRAM(s) IV Intermittent once, 09-13-23 @ 17:49, STAT, Stop order after: 1 Doses  vancomycin  IVPB 2500 milliGRAM(s) IV Intermittent once, 09-14-23 @ 07:40, , Stop order after: 1 Doses  vancomycin  IVPB 1750 milliGRAM(s) IV Intermittent once, 09-18-23 @ 13:41, STAT, Stop order after: 1 Doses    azithromycin  IVPB    , 09-13-23 @ 17:49,   azithromycin  IVPB 500 milliGRAM(s) IV Intermittent every 24 hours, 09-14-23 @ 17:49,   trimethoprim  40 mG/sulfamethoxazole 200 mG Suspension 160 milliGRAM(s) Oral daily, 09-13-23 @ 18:19, Routine  trimethoprim  40 mG/sulfamethoxazole 200 mG Suspension 160 milliGRAM(s) Oral daily, 09-13-23 @ 18:21,   vancomycin  IVPB 1750 milliGRAM(s) IV Intermittent every 12 hours, 09-14-23 @ 20:00, 20:00, Stop order after: 7 Days  vancomycin  IVPB 1500 milliGRAM(s) IV Intermittent every 12 hours, 09-14-23 @ 20:00, 20:00, Stop order after: 7 Days  vancomycin  IVPB 2000 milliGRAM(s) IV Intermittent every 12 hours, 09-14-23 @ 07:13, Routine, Stop order after: 7 Days  vancomycin  IVPB    , 09-18-23 @ 14:56,   vancomycin  IVPB 1750 milliGRAM(s) IV Intermittent every 12 hours, 09-19-23 @ 06:00, , Stop order after: 7 Days    atovaquone  Suspension 1500 milliGRAM(s) Oral daily  meropenem  IVPB      meropenem  IVPB 1000 milliGRAM(s) IV Intermittent every 8 hours, Stop order after: 7 Days      Allergies    No Known Allergies    Intolerances     MICU Progress Note  ---Enzo Bolaños MD, PGY4  ---------------------------------------------------------------------------------------------    SUBJECTIVE  INTERVAL EVENTS: Down on FiO2, FdO2, and sweep  OVERNIGHT EVENTS: had a few episodes of hypotension requiring going back on marianne  ADDITIONAL REVIEW OF SYSTEMS:  [x] Unable to assess due to sedation    OBJECTIVE  Physical Exam:     CONST:               Ill-appearing. Overall improving since yesterday.  EYES:                   conjunctiva and sclera clear; PERRL; no anisicoria; ocular lubricant noted.  RESP/CHEST:       Ventilator sounds symmetric bilaterally. No wheezing. No chest wall crepitus.  CARDIOVASC:      RRR, normal S1/S2, no M/R/G; no appreciable JVD  ABDOMEN:         Soft, NT/ND, no organomegaly noted  EXT:                   Warm to touch. Anasarca present. Cap refill <2s; pulses are 2+ B/L  NEURO:               Sedated to RASS -1. + spont mvmts. Withdraws to pain x4. shakes head to questions, opens eyes  SKIN:                   No diffuse rashes; no pressure injury noted; no mottling    TUBES/LINES/DRAINS:  [x] ETT  [x] NGT/OGT  [x] Peripheral IV  [x] Central Venous Line     	[] R	[x] L	[x] IJ	[] Fem	[] SC	Date Placed: 9/13/23  [x] ECMO inflow                           [x] R      [] L         [x] Fem             Date Placed 9/13/23   [x] ECMO outflow                         [x] R	[] L	[x] IJ	[] Fem	Date Placed: 9/13/23  [x] Arterial Line		[] R	[x] L	[] Fem	[] Rad	[x] Ax	Date Placed: 9/13/23  [] PICC:         	[] Midline		[] Mediport  [] Urinary Catheter		Date Placed:     ICU Vital Signs Last 24 Hrs  T(F): 96.9 (20 Sep 2023 04:00), Max: 96.9 (20 Sep 2023 04:00)  HR: 119 (20 Sep 2023 07:28) (76 - 142)  BP: --  BP(mean): --  ABP: 129/91 (20 Sep 2023 07:00) (61/40 - 188/122)  ABP(mean): 109 (20 Sep 2023 07:00) (46 - 151)  RR: 22 (20 Sep 2023 07:00) (12 - 35)  SpO2: 100% (20 Sep 2023 07:28) (92% - 100%)    I&O's Summary    19 Sep 2023 07:01  -  20 Sep 2023 07:00  --------------------------------------------------------  IN: 4421.7 mL / OUT: 3850 mL / NET: 571.7 mL      Mode: AC/ CMV (Assist Control/ Continuous Mandatory Ventilation)  RR (machine): 12  FiO2: 50  PEEP: 12  MAP: 15  PC: 15  PIP: 28    ECMO Day# 7    Adult Advanced Hemodynamics Last 24 Hrs  CVP(mm Hg): --  CVP(cm H2O): --  CO: --  CI: --  PA: --  PA(mean): --  PCWP: --  SVR: --  SVRI: --  PVR: --  PVRI: --    ECMO SETTINGS:  Type:		[x ] Venovenous		[ ] Venoarterial  Cannulation Site(s): R Fem, RIJ    Flow: 3.75 	        RPM: 2750		    Oxygenator:	Sweep:  0.5   L/min	FiO2:    0.5  Pressures:  Pre-Membrane (mm/Hg):  164 (20 Sep 2023 08:00)     Post-Membrane (mm/Hg):  137 (20 Sep 2023 08:00)  Pre-membrane VBG - 20 Sep 2023 04:46  pH: 7.45  / pCO2: 48    /  pO2: 50    /  HCO3: 33    /   Base Excess: 8.3   / SvO2: 81.0   Post-Membrane ABG - ( 20 Sep 2023 04:46 )  pH: 7.43  /  pCO2: 54    / pO2: 103   /  HCO3: 36    /  Base Excess: 9.6   /  SaO2: 97.5     LABS:                        11.2   1.03  )-----------( 33       ( 20 Sep 2023 04:46 )             34.9     09-20    144  |  105  |  45<H>  ----------------------------<  175<H>  4.7   |  32<H>  |  0.37<L>    Ca    8.9      20 Sep 2023 04:46  Phos  2.9     09-20  Mg     2.40     09-20    TPro  4.6<L>  /  Alb  3.1<L>  /  TBili  12.6<H>  /  DBili  x   /  AST  80<H>  /  ALT  313<H>  /  AlkPhos  201<H>  09-20    PT/INR - ( 20 Sep 2023 04:46 )   PT: 18.0 sec;   INR: 1.63 ratio         PTT - ( 20 Sep 2023 04:46 )  PTT:56.3 sec  ABG - ( 20 Sep 2023 04:46 )  pH, Arterial: 7.46  pH, Blood: x     /  pCO2: 49    /  pO2: 158   / HCO3: 35    / Base Excess: 9.6   /  SaO2: 99.5    --FdO2 50%, FiO2 50%              Urinalysis Basic - ( 20 Sep 2023 04:46 )    Color: x / Appearance: x / SG: x / pH: x  Gluc: 175 mg/dL / Ketone: x  / Bili: x / Urobili: x   Blood: x / Protein: x / Nitrite: x   Leuk Esterase: x / RBC: x / WBC x   Sq Epi: x / Non Sq Epi: x / Bacteria: x          CAPILLARY BLOOD GLUCOSE      POCT Blood Glucose.: 161 mg/dL (20 Sep 2023 05:00)  POCT Blood Glucose.: 189 mg/dL (20 Sep 2023 00:37)  POCT Blood Glucose.: 215 mg/dL (19 Sep 2023 23:27)  POCT Blood Glucose.: 164 mg/dL (19 Sep 2023 17:50)  POCT Blood Glucose.: 157 mg/dL (19 Sep 2023 14:04)  POCT Blood Glucose.: 189 mg/dL (19 Sep 2023 13:01)  POCT Blood Glucose.: 149 mg/dL (19 Sep 2023 12:09)  POCT Blood Glucose.: 225 mg/dL (19 Sep 2023 11:12)  POCT Blood Glucose.: 229 mg/dL (19 Sep 2023 10:27)  POCT Blood Glucose.: 205 mg/dL (19 Sep 2023 09:15)      Culture - Sputum (collected 19 Sep 2023 10:40)  Source: ET Tube ET Tube  Gram Stain (19 Sep 2023 23:20):    Few polymorphonuclear leukocytes per low power field    Few Squamous epithelial cells per low power field    Moderate Yeast per oil power field    Moderate Gram Positive Cocci in Clusters per oil power field        RADIOLOGY & ADDITIONAL TESTS:  Results Reviewed:   Imaging Personally Reviewed:  Electrocardiogram Personally Reviewed:    Medications and Allergies:   MEDICATIONS  (STANDING):  albuterol/ipratropium for Nebulization 3 milliLiter(s) Nebulizer every 6 hours  argatroban Infusion 0.18 MICROgram(s)/kG/Min (1.38 mL/Hr) IV Continuous <Continuous>  artificial  tears Solution 1 Drop(s) Both EYES every 12 hours  atovaquone  Suspension 1500 milliGRAM(s) Oral daily  chlorhexidine 0.12% Liquid 15 milliLiter(s) Oral Mucosa every 12 hours  chlorhexidine 2% Cloths 1 Application(s) Topical <User Schedule>  dexMEDEtomidine Infusion 0.2 MICROgram(s)/kG/Hr (6.38 mL/Hr) IV Continuous <Continuous>  dextrose 5%. 1000 milliLiter(s) (50 mL/Hr) IV Continuous <Continuous>  dextrose 50% Injectable 25 Gram(s) IV Push once  dextrose 50% Injectable 12.5 Gram(s) IV Push once  dextrose 50% Injectable 25 Gram(s) IV Push once  filgrastim-sndz (ZARXIO) Injectable 480 MICROGram(s) SubCutaneous daily  folic acid 1 milliGRAM(s) Oral daily  glucagon  Injectable 1 milliGRAM(s) IntraMuscular once  insulin lispro (ADMELOG) corrective regimen sliding scale   SubCutaneous every 6 hours  insulin NPH human recombinant 11 Unit(s) SubCutaneous every 6 hours  meropenem  IVPB      meropenem  IVPB 1000 milliGRAM(s) IV Intermittent every 8 hours  methylPREDNISolone sodium succinate Injectable 80 milliGRAM(s) IV Push daily  metoprolol tartrate 25 milliGRAM(s) Oral two times a day  multivitamin/minerals/iron Oral Solution (CENTRUM) 15 milliLiter(s) Oral daily  naloxegol 25 milliGRAM(s) Oral daily  norepinephrine Infusion 0.3 MICROgram(s)/kG/Min (36.9 mL/Hr) IV Continuous <Continuous>  pantoprazole  Injectable 40 milliGRAM(s) IV Push daily  phenylephrine    Infusion 0.5 MICROgram(s)/kG/Min (23.9 mL/Hr) IV Continuous <Continuous>  polyethylene glycol 3350 17 Gram(s) Oral <User Schedule>  senna Syrup 10 milliLiter(s) Oral two times a day    MEDICATIONS  (PRN):  dextrose Oral Gel 15 Gram(s) Oral once PRN Blood Glucose LESS THAN 70 milliGRAM(s)/deciliter  diphenhydrAMINE Injectable 50 milliGRAM(s) IV Push once PRN chemotherapy reaction  meperidine     Injectable 25 milliGRAM(s) IV Push once PRN chemotherapy reaction      Antimicrobials  atovaquone  Suspension 1500 milliGRAM(s) Oral daily  meropenem  IVPB      meropenem  IVPB 1000 milliGRAM(s) IV Intermittent every 8 hours, Stop order after: 7 Days    azithromycin  IVPB 500 milliGRAM(s) IV Intermittent once, 09-13-23 @ 17:49, STAT  meropenem  IVPB 1000 milliGRAM(s) IV Intermittent once, 09-13-23 @ 17:49, STAT, Stop order after: 1 Doses  vancomycin  IVPB 2500 milliGRAM(s) IV Intermittent once, 09-14-23 @ 07:40, , Stop order after: 1 Doses  vancomycin  IVPB 1750 milliGRAM(s) IV Intermittent once, 09-18-23 @ 13:41, STAT, Stop order after: 1 Doses    azithromycin  IVPB    , 09-13-23 @ 17:49,   azithromycin  IVPB 500 milliGRAM(s) IV Intermittent every 24 hours, 09-14-23 @ 17:49,   trimethoprim  40 mG/sulfamethoxazole 200 mG Suspension 160 milliGRAM(s) Oral daily, 09-13-23 @ 18:19, Routine  trimethoprim  40 mG/sulfamethoxazole 200 mG Suspension 160 milliGRAM(s) Oral daily, 09-13-23 @ 18:21,   vancomycin  IVPB 1750 milliGRAM(s) IV Intermittent every 12 hours, 09-14-23 @ 20:00, 20:00, Stop order after: 7 Days  vancomycin  IVPB 1500 milliGRAM(s) IV Intermittent every 12 hours, 09-14-23 @ 20:00, 20:00, Stop order after: 7 Days  vancomycin  IVPB 2000 milliGRAM(s) IV Intermittent every 12 hours, 09-14-23 @ 07:13, Routine, Stop order after: 7 Days  vancomycin  IVPB    , 09-18-23 @ 14:56,   vancomycin  IVPB 1750 milliGRAM(s) IV Intermittent every 12 hours, 09-19-23 @ 06:00, , Stop order after: 7 Days    atovaquone  Suspension 1500 milliGRAM(s) Oral daily  meropenem  IVPB      meropenem  IVPB 1000 milliGRAM(s) IV Intermittent every 8 hours, Stop order after: 7 Days      Allergies    No Known Allergies    Intolerances

## 2023-09-20 NOTE — CHART NOTE - NSCHARTNOTEFT_GEN_A_CORE
62 year old F with no hx of lung disease now presented with SOB. A few months ago she was worked up extensively for dyspnea- neg for lupus and autoimmune markers. Patient transferred from [facility] now on ECMO.   Remains intubated. 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     9/15/23 PLEX#1, using the ECMO circuit, 3L plasma as replacement fluid, well tolerated.  9/18/23 PLEX#2, using the ECMO circuit, 3L plasma as replacement fluid, well tolerated.  9/20/23 PLEX#3, using the ECMO circuit, 3L plasma as replacement fluid, well tolerated.    Please contact blood bank for further PLEX if indicated.

## 2023-09-20 NOTE — PROGRESS NOTE ADULT - SUBJECTIVE AND OBJECTIVE BOX
Follow Up:      Inverval History/ROS: Patient is a 62y old  Female who presents with a chief complaint of VV ECMO (20 Sep 2023 15:25)    On ECMO  No fever      Allergies    No Known Allergies    Intolerances        ANTIMICROBIALS:  atovaquone  Suspension 1500 daily  meropenem  IVPB 1000 every 8 hours      OTHER MEDS:  albuterol/ipratropium for Nebulization 3 milliLiter(s) Nebulizer every 6 hours  argatroban Infusion 0.18 MICROgram(s)/kG/Min IV Continuous <Continuous>  artificial  tears Solution 1 Drop(s) Both EYES every 12 hours  chlorhexidine 0.12% Liquid 15 milliLiter(s) Oral Mucosa every 12 hours  chlorhexidine 2% Cloths 1 Application(s) Topical <User Schedule>  dexMEDEtomidine Infusion 0.2 MICROgram(s)/kG/Hr IV Continuous <Continuous>  dextrose 5%. 1000 milliLiter(s) IV Continuous <Continuous>  dextrose 50% Injectable 25 Gram(s) IV Push once  dextrose 50% Injectable 12.5 Gram(s) IV Push once  dextrose 50% Injectable 25 Gram(s) IV Push once  dextrose Oral Gel 15 Gram(s) Oral once PRN  diphenhydrAMINE Injectable 50 milliGRAM(s) IV Push once PRN  fentaNYL    Injectable 100 MICROGram(s) IV Push once  filgrastim-sndz (ZARXIO) Injectable 480 MICROGram(s) SubCutaneous daily  folic acid 1 milliGRAM(s) Oral daily  glucagon  Injectable 1 milliGRAM(s) IntraMuscular once  insulin lispro (ADMELOG) corrective regimen sliding scale   SubCutaneous every 6 hours  insulin NPH human recombinant 11 Unit(s) SubCutaneous every 6 hours  lidocaine 1% Injectable 50 milliLiter(s) Local Injection once  meperidine     Injectable 25 milliGRAM(s) IV Push once PRN  methylPREDNISolone sodium succinate Injectable 80 milliGRAM(s) IV Push daily  metoprolol tartrate 12.5 milliGRAM(s) Oral two times a day  multivitamin/minerals/iron Oral Solution (CENTRUM) 15 milliLiter(s) Oral daily  norepinephrine Infusion 0.3 MICROgram(s)/kG/Min IV Continuous <Continuous>  pantoprazole  Injectable 40 milliGRAM(s) IV Push daily  phenylephrine    Infusion 0.5 MICROgram(s)/kG/Min IV Continuous <Continuous>  polyethylene glycol 3350 17 Gram(s) Oral <User Schedule>  propofol Injectable 100 milliGRAM(s) IV Push once  senna Syrup 10 milliLiter(s) Oral two times a day      Vital Signs Last 24 Hrs  T(C): 35.9 (20 Sep 2023 12:00), Max: 36.1 (20 Sep 2023 04:00)  T(F): 96.7 (20 Sep 2023 12:00), Max: 96.9 (20 Sep 2023 04:00)  HR: 112 (20 Sep 2023 15:00) (99 - 191)  BP: 156/88 (20 Sep 2023 08:00) (156/88 - 156/88)  BP(mean): 104 (20 Sep 2023 08:00) (104 - 104)  RR: 13 (20 Sep 2023 15:00) (12 - 35)  SpO2: 98% (20 Sep 2023 15:00) (95% - 100%)    Parameters below as of 20 Sep 2023 15:00  Patient On (Oxygen Delivery Method): ventilator, P-AC PS 27 RATE 12    O2 Concentration (%): 40    PHYSICAL EXAM:  General: [ ] non-toxic  HEAD/EYES: [ ] PERRL [x ] white sclera [ ] icterus  ENT:  [ ] normal [x ] supple [ ] thrush [ ] pharyngeal exudate  Cardiovascular:   [ ] murmur [x ] normal [ ] PPM/AICD  Respiratory:  [x ] clear to ausculation bilaterally  GI:  [ ] soft, non-tender, normal bowel sounds  :  [ ] casey [ ] no CVA tenderness   Musculoskeletal:  [ ] no synovitis  Neurologic:  [ ] non-focal exam   Skin:  [ x] no rash  Lymph: [ x] no lymphadenopathy  Psychiatric:  [ ] appropriate affect [ ] alert & oriented  Lines:  [ x] no phlebitis [ ] central line                                11.6   1.36  )-----------( 31       ( 20 Sep 2023 09:00 )             35.9       09-20    140  |  102  |  44<H>  ----------------------------<  221<H>  5.0   |  29  |  0.33<L>    Ca    9.3      20 Sep 2023 09:00  Phos  3.1     09-20  Mg     2.30     09-20    TPro  5.0<L>  /  Alb  3.3  /  TBili  14.1<H>  /  DBili  x   /  AST  86<H>  /  ALT  292<H>  /  AlkPhos  243<H>  09-20      Urinalysis Basic - ( 20 Sep 2023 09:00 )    Color: x / Appearance: x / SG: x / pH: x  Gluc: 221 mg/dL / Ketone: x  / Bili: x / Urobili: x   Blood: x / Protein: x / Nitrite: x   Leuk Esterase: x / RBC: x / WBC x   Sq Epi: x / Non Sq Epi: x / Bacteria: x        MICROBIOLOGY:Culture Results:   No growth at 4 days (09-16-23 @ 10:30)  Culture Results:   No growth at 4 days (09-16-23 @ 10:25)  Culture Results:   Normal Respiratory Noreen present (09-16-23 @ 09:00)  Culture Results:   No growth at 5 days (09-13-23 @ 17:51)  Culture Results:   No growth at 5 days (09-13-23 @ 17:44)  Culture Results:   No growth (09-13-23 @ 17:25)  Culture Results:   No growth (09-13-23 @ 15:45)  Culture Results:   Culture is being performed. (09-13-23 @ 15:45)  Culture Results:   Culture is being performed. Fungal cultures are held for 4 weeks. (09-13-23 @ 15:45)      RADIOLOGY:

## 2023-09-20 NOTE — PROGRESS NOTE ADULT - ASSESSMENT
63 yo F w/ Afib initially presented to urgent care for SOB where she had an XR done concerning for b/l lower infiltrates, sent to [facility] ED and  admitted to [facility] on 8/26 after being found to be hypoxemic, reported having  debilitating b/l hand numbness/tingling and some muscles weakness several months. Found to be hypoxic and have interstitial lung disease appearance on CT, admitted 8/26 for AHRF, further ILD workup and management,  started on prednisone on admission with gradually improving O2 requirement and has been stable on 2-3LNC since 9/3, underwent bronchoscopy with TBBX on 8/30 which showed Non-Specific Intersitial Disease (NSIP)/OP. Labs notable for positive ARIANA 1:1280, RNP > 8, Alejandro > 8. Patient continued on steroids and recieved cellcept, which was discontinued 2/2 Afib-RVR. Interval CTA chest on 9/11 also negative PE did show possible lingula pneumonia.  Started on empiric vancomycin and zosyn 9/12, course was then c/b episode of SVT to 170s w/ rapidly progressive hypoxemic respiratory failure, placed NRB offered HFNC/BiPAP but refused, leading to worsening hypoxemic respiratory failure requiring intubation  9/13. Despite best practices, patient remained hypoxemic and patient was cannulated for VV-ECMO on 9/13 and transferred to [facility] for further management.     NEUROLOGY  Home meds: gabapentin 100g TID  AA&Ox3 at baseline   - following commands off sedation  - currently on precedex for comfort remains off dilaudid   - CTH 9/14 no acute findings  - Palliative following  - Social Work consult  - PT/OT following    PULMONARY  Admitted to [facility] on 8/26 after p/w SOB, found to be hypoxemic, required 2-4L NC/bibap, initially responded well to IV steroids. 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, occupation in construction  - pt reportedly had been seen by a pulmonologist and rheumatology ([place]), and was ruled out for lupus and PE  - CT findings at OSH consistent with ILD   - Current ventilator settings: PS  RR 12, PS 15, PEEP 12 Fi02 50% pulling TV ~400-500  - Emergency settings: VC 28/400/12/100%  - Bronchoscopy showed mild thick yellow secretions in trachea, diffuse moderate thin green/brown secretions throughout, serial BAL x3 performed of RML with progressively bloody return indicating DAH likely 2/2 MCTD?  - rheum consulted for DAH  - continue with duonebs,  trialed IPV but vomited shortly after initiation, now discontinued,  - c/w empiric abx         ECMO  VV ECMO		  Cannulation sites/dates: 9/13/23 R fem 25F. RIJ 19F   Flow: 3.7		P1: 162  	Delta P: 22  RPM: 2850		P2: 140		SVO2: 82  Sweep: 0.5 L/min:              FIO2:   50%      - 9/14 right groin cannula pulled-back ~3-4cm to position tip of cannula just inferior to hepatic vein   - clinically perfusing. Lactate:   - ECMO sweep sighing q1hr  - Adjust circuit flow as tolerated with DO2:VO2 goal >2  - Monitor for hemolysis, chatter, evidence of recirculation   - Plan to call CT surgery PA in the AM to suture right femoral cannula securely now that patient is more alert/awake      CARDIOVASCULAR  Hx of Afib w at OSH, started on Amiodarone gtt now in shock state requiring requiring pressors likely septic with component of vaspoplegia i/s/o sedation, possible contribution of cardiogenic from RV dysfunction   - No PE seen on invasive pulmonary angiography at time of ECMO cannulation or in recent imaging  - NO2 weaned off  (9/15) RV function remains unchanged  - s/p milrinone, off since (9/13)  - converted to sinus (9/14) discontinued amio gtt iso bradycardia   - back to AF with RVR on 9/19 when sedation weaned off. started on metoprolol for rate control  - overnight 9/19 had some hypotension now back on marianne (0.4)  - RITCHIE 9/14 : No MEREDITH thrombus noted, negative for PFO. LVOT diameter of 2 cm  - TTE 9/12 with EF 65-70% with normal LV and RV  - TTE 9/14 with  EF 18%. Severe global LV systolic dysfunction. RV enlargement with decreased RV systolic function, however RITCHIE and recent POCUS shows normal LV systolic function, improved RV systolic function   - TTE 9/19 with recovered EF to 67% but still with RV enlargement and systolic dysfunction    GASTROINTESTINAL  Transaminase elevation likely 2/2 combination of shock liver, malposition of ECMO cannula and liver dysfunction  - ALK normal 106, AST/ALT downtrending but remain elevated 240/760, T.bili continues to rise 9.8  - Acute hepatitis panel negative  - RUQ US 9/15 shows fatty infiltration of liver, negative for hepatobiliary pathology, repeat RUQUS negative as well  - ECMO canula repositioned appropriately 9/14  - Hepatology consulted - likely shock liver with expected delay in improvement in bilirubin  - Triglycerides elevated 836, on insulin gtt for hyperglycemia, downtrending 406 > 271 >221 >173  - Episode of vomiting 9/16, tube feeds held and given reglan x48 hours, restarted TF 9/17, tolerating  - BM noted 9/17 and 9/18  - continue bowel regimen senna, miralax and movantik to avoid opioid induced constipation  - CT abdomen 9/15 w/o bowel obstruction, noted with colonic diverticulosis mostly in sigmoid, w/o evidence of diverticulitis  - continue PPI iso steroids   - nutrition following        RENAL  sCr baseline 0.78  - Creatinine wnl, 0.37 today   - s/p diuresis with lasix, last administered 9/15, IVC 1.8cm  - now with hemoconcentrator to assist with fluid removal was removing -50cc/h overnight   - metabolic alkalosis, ?contraction, goal to keep net even given hypernatremia and elevated bicarb  - on primafit, making urine  - monitor, Replete to K>4, Phos>3, Mg>2, iCa>1  - hypernatremia improving, on 1/2NS on hemoconcentrator and free water by NGT      INFECTIOUS DISEASE  Leukopenia likely transient? vs drug induced vs infection vs malignancy?  - WBC 1.03 today (improving), remains afebrile however temperature has been regulated via ECMO circuit, will turn off and monitor for fever  - Bactrim DS discontinued iso leukopenia, continue Mepron for PJP prophylaxis instead  - heme following- thrombocytopenia noted as well as leukopenia, likely drug induced? filgrastim started 9/17  - s/p IV Ceftriaxone 5 days? at [facility] out of an abundance of precaution to cover BAL specimen  - s/p zosyn at OSH (9/12) for lingula PNA  - vancomycin (9/12-9/15 ) d/c'd negative mrsa PCR, and azithro (9/14-9/15) d/c'd neg Urine legionella  - c/w empiric donald (9/13- ) for 7 day course  - vanco restarted for ppx on 9/18 d/t leukopenia but stopped on 9/20  - 9/13 and 9/16 urine, sputum and blood cx ngtd  - positive COVID-19 antigen in late August  - f/u BAL, cultures, cytopathology, IL2 receptor  - ID consult for leukopenia - f/u recs      ENDOCRINE  # Hyperglycemia i/s/o steroids  Admission A1c 6.1  - had required insulin gtt but now back on NPH 11 with ISS  - elevated triglycerides 835, has hx of fatty liver, had beenon propofol gtt. TG downtrending since initiating insulin gtt for hyperglycemia, now 114, continue to monitor daily  - TSH low at 0.13/Free T4 slightly low 0.7, will repeat in a few days      HEME  # ECMO anticoagulation  - continue argatroban gtt goal aPTT 50-70  - INR elevated likely iso argatroban and liver dysfxn  - platelets continue to downtrend, refractory after transfusion, 47  -> 51 -> 46 now 25  - s/p Vit. K (9/14) and FFP x1 (9/15), platelets with goal >50k  - fibrinogen 250, monitor daily  - Hgb remains stable 11.3  - Transfuse for Hgb <7, Plt<10  - Heme consult      #Leukopenia  #Thrombocytopenia  - Heme consult placed for thrombocytopenia, noted to also be leukopenic  - unlikely HLH/MAS since many abnormalities can be explained by severe hypoxemia/hypotension during initial decompensation prior to ecmo cannulation but some features that are consistent and with rheumatic disease it is a possibility to f/u hematology regarding utility of further lab/pathologic testing  - peripheral blood smear reviewed: large platelets noted, no platelet clumps, no blast cells noted, neutrophils noted w/ normal number of lobes, nucleated RBC noted  - acute hepatitis panel negative  - give filgrastim 480 daily until ANC >1500 in the setting of possible infxn       #R/O DVT  - LE duplex negative for DVT but will repeat  - f/u duplex- pending    MSK  Onset of debilitating b/l hand cramps and some muscles weakness x several months, unclear etiology, radiculopathy? vs myositis?   - MRI outpatient reportedly showed cervical spine issues, started on Prednisone shortly before admission at OSH  - myomarker panel +ivon only for RNP  - seen by neurologist at [facility]   - symptoms improved with cellcept (9/8-9/11) but discontinued given Afib RVR   - f/u with Dr. Nik Marie or Dante Urbano for EMG upon discharge (osh?)      RHEUM  - started on Solumedrol 60mg q6h from 9/1 to 9/6 then weaned over time to then Medrol 32 mg 9/9 to 9/12 at [facility]  - s/p Cellcept 9/8 to 9/11 but stopped due to Afib- RVR/tachycardia   - CT findings, Improved/decreased extent of bilateral ground glass opacities and reticular markings compared to the previous study. Findings are nonspecific but differential etiologies include interstitial pneumonia, drug toxicity, nonspecific connective tissue disorders.  - OSH labs show strongly positive ARIANA, RNP, and anti smith antibodies with low C4 and normal C3. 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 First session  (9/15), next session planned for today 9/20 (will need to coordinate time with perfusion and blood bank)    SKIN  Reportedly had single episode of painful rash on her shins, ears and neck and chest which resolved with a steroid cream from a dermatologist prior to admission  - no evidence of rash currently      PROPHYLAXIS  # DVT: argatroban  # GI: TF      LINES  -Ecmo Cannulas  -LIJ TLC- 9/13 (placed at OSH)  -Left Axill [place] - 9/13 (placed at OSH)  -RA 16g PIV x2- 9/15      GOC  -Palliative Following   63 yo F w/ Afib initially presented to urgent care for SOB where she had an XR done concerning for b/l lower infiltrates, sent to [facility] ED and  admitted to [facility] on 8/26 after being found to be hypoxemic, reported having  debilitating b/l hand numbness/tingling and some muscles weakness several months. Found to be hypoxic and have interstitial lung disease appearance on CT, admitted 8/26 for AHRF, further ILD workup and management,  started on prednisone on admission with gradually improving O2 requirement and has been stable on 2-3LNC since 9/3, underwent bronchoscopy with TBBX on 8/30 which showed Non-Specific Intersitial Disease (NSIP)/OP. Labs notable for positive ARIANA 1:1280, RNP > 8, Alejandro > 8. Patient continued on steroids and recieved cellcept, which was discontinued 2/2 Afib-RVR. Interval CTA chest on 9/11 also negative PE did show possible lingula pneumonia.  Started on empiric vancomycin and zosyn 9/12, course was then c/b episode of SVT to 170s w/ rapidly progressive hypoxemic respiratory failure, placed NRB offered HFNC/BiPAP but refused, leading to worsening hypoxemic respiratory failure requiring intubation  9/13. Despite best practices, patient remained hypoxemic and patient was cannulated for VV-ECMO on 9/13 and transferred to [facility] for further management.     NEUROLOGY  Home meds: gabapentin 100g TID  AA&Ox3 at baseline   - following commands off sedation  - currently on precedex for comfort remains off dilaudid   - CTH 9/14 no acute findings  - Palliative following  - Social Work consult  - PT/OT following    PULMONARY  Admitted to [facility] on 8/26 after p/w SOB, found to be hypoxemic, required 2-4L NC/bibap, initially responded well to IV steroids. 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, occupation in construction  - pt reportedly had been seen by a pulmonologist and rheumatology ([place]), and was ruled out for lupus and PE  - CT findings at OSH consistent with ILD   - Current ventilator settings: PS  RR 12, PS 15, PEEP 12 Fi02 50% pulling TV ~400-500  - Emergency settings: VC 28/400/12/100%  - Bronchoscopy showed mild thick yellow secretions in trachea, diffuse moderate thin green/brown secretions throughout, serial BAL x3 performed of RML with progressively bloody return 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 RIJ 19F   Flow: 3.7		P1: 164  	Delta P: 27  RPM: 2850		P2: 137		SVO2: 79  Sweep: 0.5 L/min:              FIO2:   50%    - 9/14 right groin cannula pulled-back ~3-4cm to position tip of cannula just inferior to hepatic vein   - clinically perfusing. Lactate: 1.5  - ECMO sweep sighing q1hr  - Adjust circuit flow as tolerated with DO2:VO2 goal >2  - decreased pump FdO2 to 40% and vent FiO2 to 40%  - Monitor for hemolysis, chatter, evidence of recirculation       CARDIOVASCULAR  Hx of Afib w at OSH, started on Amiodarone gtt now in shock state requiring requiring pressors likely septic with component of vaspoplegia i/s/o sedation, possible contribution of cardiogenic from RV dysfunction   - No PE seen on invasive pulmonary angiography at time of ECMO cannulation or in recent imaging  - NO2 weaned off  (9/15) RV function remains unchanged  - s/p milrinone, off since (9/13)  - converted to sinus (9/14) discontinued amio gtt iso bradycardia   - back to AF with RVR on 9/19 when sedation weaned off. started on metoprolol for rate control  - overnight 9/19 had some hypotension now back on marianne (0.4)  - RITCHIE 9/14 : No MEREDITH thrombus noted, negative for PFO. LVOT diameter of 2 cm  - TTE 9/12 with EF 65-70% with normal LV and RV  - TTE 9/14 with  EF 18%. Severe global LV systolic dysfunction. RV enlargement with decreased RV systolic function, however RITCHIE and recent POCUS shows normal LV systolic function, improved RV systolic function   - TTE 9/19 with recovered EF to 67% but still with RV enlargement and systolic dysfunction    GASTROINTESTINAL  Transaminase elevation likely 2/2 combination of shock liver, malposition of ECMO cannula and liver dysfunction  - ALK normal 106, AST/ALT downtrending but remain elevated 240/760, T.bili continues to rise 9.8  - Acute hepatitis panel negative  - RUQ US 9/15 shows fatty infiltration of liver, negative for hepatobiliary pathology, repeat RUQUS negative as well  - ECMO canula repositioned appropriately 9/14  - Hepatology consulted - likely shock liver with expected delay in improvement in bilirubin  - Triglycerides elevated 836, on insulin gtt for hyperglycemia, downtrending 406 > 271 >221 >173  - Episode of vomiting 9/16, tube feeds held and given reglan x48 hours, restarted TF 9/17, tolerating  - BM noted 9/17 and 9/18  - continue bowel regimen senna, miralax and movantik to avoid opioid induced constipation  - CT abdomen 9/15 w/o bowel obstruction, noted with colonic diverticulosis mostly in sigmoid, w/o evidence of diverticulitis  - continue PPI iso steroids   - nutrition following        RENAL  sCr baseline 0.78  - Creatinine wnl, 0.37 today   - s/p diuresis with lasix, last administered 9/15, IVC 1.8cm  - now with hemoconcentrator to assist with fluid removal was removing -50cc/h overnight   - metabolic alkalosis, ?contraction, goal to keep net even given hypernatremia and elevated bicarb  - on primafit, making urine  - monitor, Replete to K>4, Phos>3, Mg>2, iCa>1  - hypernatremia improving, on 1/2NS on hemoconcentrator and free water by NGT      INFECTIOUS DISEASE  Leukopenia likely transient? vs drug induced vs infection vs malignancy?  - WBC 1.03 today (improving), remains afebrile however temperature has been regulated via ECMO circuit, will turn off and monitor for fever  - Bactrim DS discontinued iso leukopenia, continue Mepron for PJP prophylaxis instead  - heme following- thrombocytopenia noted as well as leukopenia, likely drug induced? filgrastim started 9/17  - s/p IV Ceftriaxone 5 days? at [facility] out of an abundance of precaution to cover BAL specimen  - s/p zosyn at OSH (9/12) for lingula PNA  - vancomycin (9/12-9/15 ) d/c'd negative mrsa PCR, and azithro (9/14-9/15) d/c'd neg Urine legionella  - c/w empiric donald (9/13- ) for 7 day course  - vanco restarted for ppx on 9/18 d/t leukopenia but stopped on 9/20  - 9/13 and 9/16 urine, sputum and blood cx ngtd  - positive COVID-19 antigen in late August  - f/u BAL, cultures, cytopathology, IL2 receptor  - ID consult for leukopenia - f/u recs      ENDOCRINE  # Hyperglycemia i/s/o steroids  Admission A1c 6.1  - had required insulin gtt but now back on NPH 11 with ISS  - elevated triglycerides 835, has hx of fatty liver, had beenon propofol gtt. TG downtrending since initiating insulin gtt for hyperglycemia, now 114, continue to monitor daily  - TSH low at 0.13/Free T4 slightly low 0.7, will repeat in a few days      HEME  # ECMO anticoagulation  - continue argatroban gtt goal aPTT 50-70  - INR elevated likely iso argatroban and liver dysfxn  - platelets continue to downtrend, refractory after transfusion, 47  -> 51 -> 46 now 25  - s/p Vit. K (9/14) and FFP x1 (9/15), platelets with goal >50k  - fibrinogen 250, monitor daily  - Hgb remains stable 11.3  - Transfuse for Hgb <7, Plt<10  - Heme consult      #Leukopenia  #Thrombocytopenia  - Heme consult placed for thrombocytopenia, noted to also be leukopenic  - unlikely HLH/MAS since many abnormalities can be explained by severe hypoxemia/hypotension during initial decompensation prior to ecmo cannulation but some features that are consistent and with rheumatic disease it is a possibility to f/u hematology regarding utility of further lab/pathologic testing  - peripheral blood smear reviewed: large platelets noted, no platelet clumps, no blast cells noted, neutrophils noted w/ normal number of lobes, nucleated RBC noted  - acute hepatitis panel negative  - give filgrastim 480 daily until ANC >1500 in the setting of possible infxn       #R/O DVT  - LE duplex negative for DVT but will repeat  - f/u duplex- pending    MSK  Onset of debilitating b/l hand cramps and some muscles weakness x several months, unclear etiology, radiculopathy? vs myositis?   - MRI outpatient reportedly showed cervical spine issues, started on Prednisone shortly before admission at OSH  - myomarker panel +ivon only for RNP  - seen by neurologist at [facility]   - symptoms improved with cellcept (9/8-9/11) but discontinued given Afib RVR   - f/u with Dr. Nik Marie or Dante Urbano for EMG upon discharge (osh?)      RHEUM  - started on Solumedrol 60mg q6h from 9/1 to 9/6 then weaned over time to then Medrol 32 mg 9/9 to 9/12 at [facility]  - s/p Cellcept 9/8 to 9/11 but stopped due to Afib- RVR/tachycardia   - CT findings, Improved/decreased extent of bilateral ground glass opacities and reticular markings compared to the previous study. Findings are nonspecific but differential etiologies include interstitial pneumonia, drug toxicity, nonspecific connective tissue disorders.  - OSH labs show strongly positive ARIANA, RNP, and anti smith antibodies with low C4 and normal C3. 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 First session  (9/15), next session planned for today 9/20 (will need to coordinate time with perfusion and blood bank)    SKIN  Reportedly had single episode of painful rash on her shins, ears and neck and chest which resolved with a steroid cream from a dermatologist prior to admission  - no evidence of rash currently      PROPHYLAXIS  # DVT: argatroban  # GI: TF      LINES  -Ecmo Cannulas  -LIJ TLC- 9/13 (placed at OSH)  -Left Axill [place] - 9/13 (placed at OSH)  -RA 16g PIV x2- 9/15      GOC  -Palliative Following   63 yo F w/ Afib initially presented to urgent care for SOB where she had an XR done concerning for b/l lower infiltrates, sent to [facility] ED and  admitted to [facility] on 8/26 after being found to be hypoxemic, reported having  debilitating b/l hand numbness/tingling and some muscles weakness several months. Found to be hypoxic and have interstitial lung disease appearance on CT, admitted 8/26 for AHRF, further ILD workup and management,  started on prednisone on admission with gradually improving O2 requirement and has been stable on 2-3LNC since 9/3, underwent bronchoscopy with TBBX on 8/30 which showed Non-Specific Intersitial Disease (NSIP)/OP. Labs notable for positive ARIANA 1:1280, RNP > 8, Alejandro > 8. Patient continued on steroids and recieved cellcept, which was discontinued 2/2 Afib-RVR. Interval CTA chest on 9/11 also negative PE did show possible lingula pneumonia.  Started on empiric vancomycin and zosyn 9/12, course was then c/b episode of SVT to 170s w/ rapidly progressive hypoxemic respiratory failure, placed NRB offered HFNC/BiPAP but refused, leading to worsening hypoxemic respiratory failure requiring intubation  9/13. Despite best practices, patient remained hypoxemic and patient was cannulated for VV-ECMO on 9/13 and transferred to [facility] for further management.     NEUROLOGY  Home meds: gabapentin 100g TID  AA&Ox3 at baseline   - following commands off sedation  - currently on precedex for comfort remains off dilaudid   - CTH 9/14 no acute findings  - Palliative following  - Social Work consult  - PT/OT following    PULMONARY  Admitted to [facility] on 8/26 after p/w SOB, found to be hypoxemic, required 2-4L NC/bibap, initially responded well to IV steroids. 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, occupation in construction  - pt reportedly had been seen by a pulmonologist and rheumatology ([place]), and was ruled out for lupus and PE  - CT findings at OSH consistent with ILD   - Current ventilator settings: PS  RR 12, PS 15, PEEP 12 Fi02 40% pulling TV ~400-500  - Emergency settings: VC 28/400/12/100%  - Bronchoscopy showed mild thick yellow secretions in trachea, diffuse moderate thin green/brown secretions throughout, serial BAL x3 performed of RML with progressively bloody return 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 RIJ 19F   Flow: 3.7		P1: 164  	Delta P: 27  RPM: 2850		P2: 137		SVO2: 79  Sweep: 0.5 L/min:              FIO2:   21%    - 9/14 right groin cannula pulled-back ~3-4cm to position tip of cannula just inferior to hepatic vein   - clinically perfusing. Lactate: 1.5  - ECMO sweep sighing q1hr  - Adjust circuit flow as tolerated with DO2:VO2 goal >2  - decreased pump FdO2 to 21% and vent FiO2 to 40%  - Monitor for hemolysis, chatter, evidence of recirculation       CARDIOVASCULAR  Hx of Afib w at OSH, started on Amiodarone gtt now in shock state requiring requiring pressors likely septic with component of vaspoplegia i/s/o sedation, possible contribution of cardiogenic from RV dysfunction   - No PE seen on invasive pulmonary angiography at time of ECMO cannulation or in recent imaging  - NO2 weaned off  (9/15) RV function remains unchanged  - s/p milrinone, off since (9/13)  - converted to sinus (9/14) discontinued amio gtt iso bradycardia   - back to AF with RVR on 9/19 when sedation weaned off. started on metoprolol for rate control  - overnight 9/19 had some hypotension now back on marianne but coming off (currently 0.2)  - RITCHIE 9/14 : No MEREDITH thrombus noted, negative for PFO. LVOT diameter of 2 cm  - TTE 9/12 with EF 65-70% with normal LV and RV  - TTE 9/14 with  EF 18%. Severe global LV systolic dysfunction. RV enlargement with decreased RV systolic function, however RITCHIE and recent POCUS shows normal LV systolic function, improved RV systolic function   - TTE 9/19 with recovered EF to 67% but still with RV enlargement and systolic dysfunction    GASTROINTESTINAL  Transaminase elevation likely 2/2 combination of shock liver, malposition of ECMO cannula and liver dysfunction  - ALK normal 106, AST/ALT downtrending but remain elevated 240/760, T.bili continues to rise 9.8  - Acute hepatitis panel negative  - RUQ US 9/15 shows fatty infiltration of liver, negative for hepatobiliary pathology, repeat RUQUS negative as well  - ECMO canula repositioned appropriately 9/14  - Hepatology consulted - likely shock liver with expected delay in improvement in bilirubin  - Triglycerides elevated 836, on insulin gtt for hyperglycemia, downtrending 406 > 271 >221 >173 > 154  - Episode of vomiting 9/16, tube feeds held and given reglan x48 hours, restarted TF 9/17, tolerating  - BM noted 9/17 and 9/18  - continue bowel regimen senna, miralax and movantik to avoid opioid induced constipation  - CT abdomen 9/15 w/o bowel obstruction, noted with colonic diverticulosis mostly in sigmoid, w/o evidence of diverticulitis  - continue PPI iso steroids   - nutrition following        RENAL  sCr baseline 0.78  - Creatinine wnl, 0.37 today   - s/p diuresis with lasix, last administered 9/15, IVC 1.8cm  - now with hemoconcentrator to assist with fluid removal was removing -50cc/h overnight   - metabolic alkalosis, ?contraction, goal to keep net even given hypernatremia and elevated bicarb  - on primafit, making urine  - monitor, Replete to K>4, Phos>3, Mg>2, iCa>1  - hypernatremia improving, on 1/2NS on hemoconcentrator and free water by NGT      INFECTIOUS DISEASE  Leukopenia likely transient? vs drug induced vs infection vs malignancy?  - WBC 1.03 today (improving), remains afebrile however temperature has been regulated via ECMO circuit, will turn off and monitor for fever  - Bactrim DS discontinued iso leukopenia, continue Mepron for PJP prophylaxis instead  - heme following- thrombocytopenia noted as well as leukopenia, likely drug induced? filgrastim started 9/17  - s/p IV Ceftriaxone 5 days? at [facility] out of an abundance of precaution to cover BAL specimen  - s/p zosyn at OSH (9/12) for lingula PNA  - vancomycin (9/12-9/15 ) d/c'd negative mrsa PCR, and azithro (9/14-9/15) d/c'd neg Urine legionella  - c/w empiric donald (9/13- ) until ANC >1.5 per ID  - vanco restarted for ppx on 9/18 d/t leukopenia but stopped on 9/20  - 9/13 and 9/16 urine, sputum and blood cx ngtd  - positive COVID-19 antigen in late August  - f/u BAL, cultures, cytopathology, IL2 receptor  - ID consult for leukopenia - f/u recs      ENDOCRINE  # Hyperglycemia i/s/o steroids  Admission A1c 6.1  - had required insulin gtt but now back on NPH 11 with ISS  - elevated triglycerides 835, has hx of fatty liver, had beenon propofol gtt. TG downtrending since initiating insulin gtt for hyperglycemia, now 114, continue to monitor daily  - TSH low at 0.13/Free T4 slightly low 0.7, will repeat in a few days      HEME  # ECMO anticoagulation  - continue argatroban gtt goal aPTT 50-70  - INR elevated likely iso argatroban and liver dysfxn  - platelets continue to downtrend, refractory after transfusion, 47  -> 51 -> 46 now 25  - s/p Vit. K (9/14) and FFP x1 (9/15), platelets with goal >50k  - fibrinogen 250, monitor daily  - Hgb remains stable 11.3  - Transfuse for Hgb <7, Plt<10  - Heme consult      #Leukopenia  #Thrombocytopenia  - Heme consult placed for thrombocytopenia, noted to also be leukopenic  - unlikely HLH/MAS since many abnormalities can be explained by severe hypoxemia/hypotension during initial decompensation prior to ecmo cannulation but some features that are consistent and with rheumatic disease it is a possibility to f/u hematology regarding utility of further lab/pathologic testing  - peripheral blood smear reviewed: large platelets noted, no platelet clumps, no blast cells noted, neutrophils noted w/ normal number of lobes, nucleated RBC noted  - acute hepatitis panel negative  - give filgrastim 480 daily until ANC >1500 in the setting of possible infxn       #R/O DVT  - LE duplex negative for DVT but will repeat  - f/u duplex- pending    MSK  Onset of debilitating b/l hand cramps and some muscles weakness x several months, unclear etiology, radiculopathy? vs myositis?   - MRI outpatient reportedly showed cervical spine issues, started on Prednisone shortly before admission at OSH  - myomarker panel +ivon only for RNP  - seen by neurologist at [facility]   - symptoms improved with cellcept (9/8-9/11) but discontinued given Afib RVR   - f/u with Dr. Nik Marie or Dante Urbano for EMG upon discharge (osh?)      RHEUM  - started on Solumedrol 60mg q6h from 9/1 to 9/6 then weaned over time to then Medrol 32 mg 9/9 to 9/12 at [facility]  - s/p Cellcept 9/8 to 9/11 but stopped due to Afib- RVR/tachycardia   - CT findings, Improved/decreased extent of bilateral ground glass opacities and reticular markings compared to the previous study. Findings are nonspecific but differential etiologies include interstitial pneumonia, drug toxicity, nonspecific connective tissue disorders.  - OSH labs show strongly positive ARIANA, RNP, and anti smith antibodies with low C4 and normal C3. Patient with negative SSa and SSb, negative DsDNa, myomarker panel negative (except RNP)  - Rheum following  - s/p 1g solumedrol - first dose on 9/13, second dose 9/14, and third and last dose today 9/15, and then solumedrol (1mg/kg) 125mg daily, plan to change to 80mg daily today as per rheum  - s/p rituximab 9/16/23  - C/w plasmapheresis for therapeutic option to rapidly remove autoantibodies and inflammatory factors from plasma d/t severe DAH. First session  (9/15), next session planned for today 9/20 (will need to coordinate time with perfusion and blood bank)    SKIN  Reportedly had single episode of painful rash on her shins, ears and neck and chest which resolved with a steroid cream from a dermatologist prior to admission  - no evidence of rash currently      PROPHYLAXIS  # DVT: argatroban  # GI: TF      LINES  -Ecmo Cannulas  -LIJ TLC- 9/13 (placed at OSH)  -Left Ax Mobile - 9/13 (placed at OSH)  -RA 16g PIV x2- 9/15      GOC  -Palliative Following

## 2023-09-20 NOTE — PROGRESS NOTE ADULT - ASSESSMENT
62-year-old female with atrial fibrillation, a history of sciatica, and a construction materials occupation was transferred for ECMO support due to severe respiratory distress. Initially admitted for acute hypoxemic respiratory failure and ILD-like findings, she responded to prednisone 40 mg daily but developed weakness/tachycardia with Mycophenolate mofetil 500mg BID (A fibr with RVR). Despite interventions, her condition worsened, intubated and ECMO transfer.    # Acute severe hepatitis -likely due to hypoperfusion   # SLE/ MCTD with DAH  # Pancytopenia, likely due to MAS vs drug induced   - Skin rash on chest with periungual ulcers, ILD, leukopenia, thrombocytopenia.   - Serology: ARIANA 1:1280 Speckled, dsDNA <12. + SM, + Anti-RNP, U1-snRNP RNP A 162, U1-snRNP RNP-70kd 111  - CT chest (8/28): Since August 26, 2023, again interstitial lung disease non-UIP pattern. Although no subpleural sparing, possibly interstitialpneumonia, differential includes NSIP associated with connective tissue disorders, chronic HP or drug toxicity.  - Bronchoscopy results (08/30): Bronchial tissue and interstitium showing organizing pneumonia and focal fibrin  - CT chest (9/11): Improved/decreased extent of bilateral groundglass opacities and reticular markings compared to the previous study. Findings are nonspecific but differential etiologies include interstitial pneumonia, drug toxicity, nonspecific connective tissue disorders. New small focus of parenchymal consolidation seen in the lingula;   possible small focal pneumonia.  - Repeat Bronchoscopy (9/13): BAL performed of lingula. Serial BAL x3 performed of RML with progressively bloody return.  - Lab showed thrombocytopenia since she developed respiratory failure ( normal on the first admission to [facility])   - WBC 0.2, PLT 46, Hg 11.3 (9/18/23)  - LFT improving   - S/p Rituximab 1 gr on 9/16/23  - s/p plasmapheresis (First session (9/15), (9/18), (9/20)  - ID consulted - c/w meropenem  - Hematology consulted -Pt started on Filgrastim w/ improvement in leukopenia  - 9/20/2023: lungs are improving clinically and radiographically. Repeat bronch without signs of DAH    Recommendations:  -c/w solumedrol 80 mg qd, will consider taper next week depending on how she does   -will plan for next rituxan dose ~9/30/23  -thrombocytopenia stable, leukopenia improving w/ filgrastim likely multifactorial etiology  -would not trend C3/C4 as will be low 2/2 liver injury, rather than SLE  -will follow     Discussed with Attending Dr. 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 Initially admitted for acute hypoxemic respiratory failure and ILD-like findings, she responded to prednisone 40 mg daily but developed weakness/tachycardia with Mycophenolate mofetil 500mg BID (A fibr with RVR). Despite interventions, her condition worsened, intubated and ECMO transfer.    # Acute severe hepatitis -likely due to hypoperfusion   # SLE/ MCTD with DAH    - Skin rash on chest with periungual ulcers, ILD, leukopenia, thrombocytopenia.   - Serology: ARIANA 1:1280 Speckled, dsDNA <12. + SM, + Anti-RNP, U1-snRNP RNP A 162, U1-snRNP RNP-70kd 111  - CT chest (8/28): Since August 26, 2023, again interstitial lung disease non-UIP pattern. Although no subpleural sparing, possibly interstitialpneumonia, differential includes NSIP associated with connective tissue disorders, chronic HP or drug toxicity.  - Bronchoscopy results (08/30): Bronchial tissue and interstitium showing organizing pneumonia and focal fibrin  - CT chest (9/11): Improved/decreased extent of bilateral groundglass opacities and reticular markings compared to the previous study. Findings are nonspecific but differential etiologies include interstitial pneumonia, drug toxicity, nonspecific connective tissue disorders. New small focus of parenchymal consolidation seen in the lingula;   possible small focal pneumonia.  - Repeat Bronchoscopy (9/13): BAL performed of lingula. Serial BAL x3 performed of RML with progressively bloody return.  - Lab showed thrombocytopenia since she developed respiratory failure ( normal on the first admission to [facility])   - LFT improving   - S/p Rituximab 1 gr on 9/16/23  - s/p plasmapheresis (First session (9/15), (9/18), (9/20)  - ID consulted - c/w meropenem  - 9/20/2023: lungs are improving clinically and radiographically. Repeat bronch without signs of DAH    #pancytopenia  -leukopenia improving w filgastrim  -persistent thrombocytopenia  -at this point, I do not think this is MAS, as ferritin normal, trigylcerides not elevated. Will await rest of labs, but not less suspicious.   -likely multifactorial: shock liver and/or medication induced      Recommendations:  -c/w solumedrol 80 mg qd, will consider taper next week  -will plan for next rituxan dose ~9/30/23  -would not trend C3/C4 as will be low 2/2 liver injury, rather than SLE  -will follow     Discussed with Attending Dr. Luz Sutton,   Rheumatology Fellow  Pager: [phone]  Available on TEAMS

## 2023-09-20 NOTE — PROCEDURE NOTE - NSBRONCHPROCDETAILS_GEN_A_CORE_FT
Indication: Evaluation for improvement in DAH    Operators: Saumya Bolaños    Pre-op Dx: MCTD ILD    Preop medications: 4mg propofol    Intraop medications: 8cc of 1% lidocaine administered - 2cc at [place], 2cc at left mainstem bronchus, 2cc at right mainstem bronchus, and 2cc at right middle lobe bronchys     Findings:  Bronchoscope inserted through ETT. ETT noted to be slightly low position. Airway evaluation revealed some areas of scattered edema. Suction trauma noted at [place] and bilateral proximal mainstem bronchi. No bleeding and minimmal secretions noted throughout all major segments of both lungs. Serial BALs performed in lateral segment of RML bronchus. Samples did not get darker with serial lavages. Fluid was serosanguinous. ETT pulled back 1cm under direct guidance and re-secured. Bronchoscope then withdrawn from ETT.    Specimens: RML BAL
Bronchoscope inserted through ETT. ETT noted to be in good position. Airway evaluation revealed mild thick yellow secretions in trachea. Diffuse moderate thin green/brown secretions throughout. Secretions therapeutically suctioned. BAL performed of lingula. Serial BAL x3 performed of RML with progressively bloody return.  Bronchoscope then withdrawn from ETT.

## 2023-09-21 LAB
ALBUMIN SERPL ELPH-MCNC: 3.2 G/DL — LOW (ref 3.3–5)
ALBUMIN SERPL ELPH-MCNC: 3.2 G/DL — LOW (ref 3.3–5)
ALP SERPL-CCNC: 295 U/L — HIGH (ref 40–120)
ALP SERPL-CCNC: 337 U/L — HIGH (ref 40–120)
ALT FLD-CCNC: 197 U/L — HIGH (ref 4–33)
ALT FLD-CCNC: 210 U/L — HIGH (ref 4–33)
ANION GAP SERPL CALC-SCNC: 8 MMOL/L — SIGNIFICANT CHANGE UP (ref 7–14)
ANION GAP SERPL CALC-SCNC: 9 MMOL/L — SIGNIFICANT CHANGE UP (ref 7–14)
APTT BLD: 43.9 SEC — HIGH (ref 24.5–35.6)
APTT BLD: 46.9 SEC — HIGH (ref 24.5–35.6)
APTT BLD: 49 SEC — HIGH (ref 24.5–35.6)
APTT BLD: 56.7 SEC — HIGH (ref 24.5–35.6)
AST SERPL-CCNC: 119 U/L — HIGH (ref 4–32)
AST SERPL-CCNC: 123 U/L — HIGH (ref 4–32)
BASE EXCESS BLDCOV CALC-SCNC: 8.4 MMOL/L — HIGH (ref -3–2)
BASE EXCESS BLDCOV CALC-SCNC: 9.6 MMOL/L — HIGH (ref -3–2)
BASOPHILS # BLD AUTO: 0 K/UL — SIGNIFICANT CHANGE UP (ref 0–0.2)
BASOPHILS # BLD AUTO: 0.01 K/UL — SIGNIFICANT CHANGE UP (ref 0–0.2)
BASOPHILS # BLD AUTO: 0.03 K/UL — SIGNIFICANT CHANGE UP (ref 0–0.2)
BASOPHILS NFR BLD AUTO: 0 % — SIGNIFICANT CHANGE UP (ref 0–2)
BASOPHILS NFR BLD AUTO: 0.1 % — SIGNIFICANT CHANGE UP (ref 0–2)
BASOPHILS NFR BLD AUTO: 0.2 % — SIGNIFICANT CHANGE UP (ref 0–2)
BILIRUB DIRECT SERPL-MCNC: 10.8 MG/DL — HIGH (ref 0–0.3)
BILIRUB SERPL-MCNC: 14.3 MG/DL — HIGH (ref 0.2–1.2)
BILIRUB SERPL-MCNC: 14.3 MG/DL — HIGH (ref 0.2–1.2)
BILIRUB SERPL-MCNC: 15.8 MG/DL — HIGH (ref 0.2–1.2)
BLOOD GAS ARTERIAL - LYTES,HGB,ICA,LACT RESULT: SIGNIFICANT CHANGE UP
BLOOD GAS ECMO POST MEMBRANE - ARTERIAL RESULT: SIGNIFICANT CHANGE UP
BLOOD GAS ECMO POST MEMBRANE - ARTERIAL RESULT: SIGNIFICANT CHANGE UP
BLOOD GAS ECMO PRE MEMBRANE - VENOUS RESULT: SIGNIFICANT CHANGE UP
BLOOD GAS ECMO PRE MEMBRANE - VENOUS RESULT: SIGNIFICANT CHANGE UP
BLOOD GAS PRE MEMBRANE - GLUCOSE: 151 MG/DL — HIGH (ref 70–99)
BLOOD GAS PRE MEMBRANE - GLUCOSE: 211 MG/DL — HIGH (ref 70–99)
BLOOD GAS PRE MEMBRANE - ICALCIUM: 1.2 MMOL/L — SIGNIFICANT CHANGE UP (ref 1.15–1.29)
BLOOD GAS PRE MEMBRANE - ICALCIUM: 1.27 MMOL/L — SIGNIFICANT CHANGE UP (ref 1.15–1.29)
BLOOD GAS PRE MEMBRANE - POTASSIUM: 4.5 MMOL/L — SIGNIFICANT CHANGE UP (ref 3.4–4.5)
BLOOD GAS PRE MEMBRANE - POTASSIUM: 4.7 MMOL/L — HIGH (ref 3.4–4.5)
BLOOD GAS PRE MEMBRANE - SODIUM: 135 MMOL/L — LOW (ref 136–146)
BLOOD GAS PRE MEMBRANE - SODIUM: 136 MMOL/L — SIGNIFICANT CHANGE UP (ref 136–146)
BUN SERPL-MCNC: 39 MG/DL — HIGH (ref 7–23)
BUN SERPL-MCNC: 40 MG/DL — HIGH (ref 7–23)
CALCIUM SERPL-MCNC: 8.8 MG/DL — SIGNIFICANT CHANGE UP (ref 8.4–10.5)
CALCIUM SERPL-MCNC: 9.1 MG/DL — SIGNIFICANT CHANGE UP (ref 8.4–10.5)
CHLORIDE SERPL-SCNC: 101 MMOL/L — SIGNIFICANT CHANGE UP (ref 98–107)
CHLORIDE SERPL-SCNC: 99 MMOL/L — SIGNIFICANT CHANGE UP (ref 98–107)
CO2 SERPL-SCNC: 29 MMOL/L — SIGNIFICANT CHANGE UP (ref 22–31)
CO2 SERPL-SCNC: 30 MMOL/L — SIGNIFICANT CHANGE UP (ref 22–31)
COHGB MFR BLDA: 2.3 % — HIGH (ref 0.5–1.5)
COHGB MFR BLDA: 2.4 % — HIGH (ref 0.5–1.5)
CREAT SERPL-MCNC: 0.35 MG/DL — LOW (ref 0.5–1.3)
CREAT SERPL-MCNC: 0.36 MG/DL — LOW (ref 0.5–1.3)
CULTURE RESULTS: SIGNIFICANT CHANGE UP
EGFR: 113 ML/MIN/1.73M2 — SIGNIFICANT CHANGE UP
EGFR: 114 ML/MIN/1.73M2 — SIGNIFICANT CHANGE UP
EOSINOPHIL # BLD AUTO: 0 K/UL — SIGNIFICANT CHANGE UP (ref 0–0.5)
EOSINOPHIL # BLD AUTO: 0.01 K/UL — SIGNIFICANT CHANGE UP (ref 0–0.5)
EOSINOPHIL # BLD AUTO: 0.08 K/UL — SIGNIFICANT CHANGE UP (ref 0–0.5)
EOSINOPHIL NFR BLD AUTO: 0 % — SIGNIFICANT CHANGE UP (ref 0–6)
EOSINOPHIL NFR BLD AUTO: 0.1 % — SIGNIFICANT CHANGE UP (ref 0–6)
EOSINOPHIL NFR BLD AUTO: 1 % — SIGNIFICANT CHANGE UP (ref 0–6)
GALACTOMANNAN AG SERPL-ACNC: 0.09 INDEX — SIGNIFICANT CHANGE UP (ref 0–0.49)
GLUCOSE BLDC GLUCOMTR-MCNC: 149 MG/DL — HIGH (ref 70–99)
GLUCOSE BLDC GLUCOMTR-MCNC: 224 MG/DL — HIGH (ref 70–99)
GLUCOSE BLDC GLUCOMTR-MCNC: 241 MG/DL — HIGH (ref 70–99)
GLUCOSE BLDC GLUCOMTR-MCNC: 93 MG/DL — SIGNIFICANT CHANGE UP (ref 70–99)
GLUCOSE SERPL-MCNC: 158 MG/DL — HIGH (ref 70–99)
GLUCOSE SERPL-MCNC: 245 MG/DL — HIGH (ref 70–99)
HAPTOGLOB SERPL-MCNC: <20 MG/DL — LOW (ref 34–200)
HCO3, PRE MEMBRANE VENOUS: 33 MMOL/L — HIGH (ref 20–27)
HCO3, PRE MEMBRANE VENOUS: 35 MMOL/L — HIGH (ref 20–27)
HCT VFR BLD CALC: 34.9 % — SIGNIFICANT CHANGE UP (ref 34.5–45)
HCT VFR BLD CALC: 35.5 % — SIGNIFICANT CHANGE UP (ref 34.5–45)
HCT VFR BLD CALC: 35.9 % — SIGNIFICANT CHANGE UP (ref 34.5–45)
HGB BLD-MCNC: 11.2 G/DL — LOW (ref 11.5–15.5)
HGB BLD-MCNC: 11.5 G/DL — SIGNIFICANT CHANGE UP (ref 11.5–15.5)
HGB BLD-MCNC: 11.6 G/DL — SIGNIFICANT CHANGE UP (ref 11.5–15.5)
IANC: 14.28 K/UL — HIGH (ref 1.8–7.4)
IANC: 6.71 K/UL — SIGNIFICANT CHANGE UP (ref 1.8–7.4)
IANC: 8.05 K/UL — HIGH (ref 1.8–7.4)
IL2 SERPL-MCNC: 3119.2 PG/ML — HIGH (ref 175.3–858.2)
IMM GRANULOCYTES NFR BLD AUTO: 1.4 % — HIGH (ref 0–0.9)
IMM GRANULOCYTES NFR BLD AUTO: 1.9 % — HIGH (ref 0–0.9)
INR BLD: 1.46 RATIO — HIGH (ref 0.85–1.18)
INR BLD: 1.49 RATIO — HIGH (ref 0.85–1.18)
LACTATE, PRE MEMBRANE VENOUS: 1.5 MMOL/L — SIGNIFICANT CHANGE UP (ref 0.5–2)
LACTATE, PRE MEMBRANE VENOUS: 1.5 MMOL/L — SIGNIFICANT CHANGE UP (ref 0.5–2)
LDH SERPL L TO P-CCNC: 434 U/L — HIGH (ref 135–225)
LYMPHOCYTES # BLD AUTO: 0.16 K/UL — LOW (ref 1–3.3)
LYMPHOCYTES # BLD AUTO: 0.25 K/UL — LOW (ref 1–3.3)
LYMPHOCYTES # BLD AUTO: 0.64 K/UL — LOW (ref 1–3.3)
LYMPHOCYTES # BLD AUTO: 1.6 % — LOW (ref 13–44)
LYMPHOCYTES # BLD AUTO: 1.7 % — LOW (ref 13–44)
LYMPHOCYTES # BLD AUTO: 8 % — LOW (ref 13–44)
MAGNESIUM SERPL-MCNC: 2.2 MG/DL — SIGNIFICANT CHANGE UP (ref 1.6–2.6)
MAGNESIUM SERPL-MCNC: 2.3 MG/DL — SIGNIFICANT CHANGE UP (ref 1.6–2.6)
MCHC RBC-ENTMCNC: 30.1 PG — SIGNIFICANT CHANGE UP (ref 27–34)
MCHC RBC-ENTMCNC: 30.2 PG — SIGNIFICANT CHANGE UP (ref 27–34)
MCHC RBC-ENTMCNC: 30.4 PG — SIGNIFICANT CHANGE UP (ref 27–34)
MCHC RBC-ENTMCNC: 32.1 GM/DL — SIGNIFICANT CHANGE UP (ref 32–36)
MCHC RBC-ENTMCNC: 32.3 GM/DL — SIGNIFICANT CHANGE UP (ref 32–36)
MCHC RBC-ENTMCNC: 32.4 GM/DL — SIGNIFICANT CHANGE UP (ref 32–36)
MCV RBC AUTO: 92.9 FL — SIGNIFICANT CHANGE UP (ref 80–100)
MCV RBC AUTO: 93.5 FL — SIGNIFICANT CHANGE UP (ref 80–100)
MCV RBC AUTO: 94.8 FL — SIGNIFICANT CHANGE UP (ref 80–100)
METHGB MFR BLDMV: 0.3 % — SIGNIFICANT CHANGE UP (ref 0–1.5)
METHGB MFR BLDMV: 0.6 % — SIGNIFICANT CHANGE UP (ref 0–1.5)
MONOCYTES # BLD AUTO: 0.8 K/UL — SIGNIFICANT CHANGE UP (ref 0–0.9)
MONOCYTES # BLD AUTO: 0.88 K/UL — SIGNIFICANT CHANGE UP (ref 0–0.9)
MONOCYTES # BLD AUTO: 1.14 K/UL — HIGH (ref 0–0.9)
MONOCYTES NFR BLD AUTO: 11 % — SIGNIFICANT CHANGE UP (ref 2–14)
MONOCYTES NFR BLD AUTO: 7.1 % — SIGNIFICANT CHANGE UP (ref 2–14)
MONOCYTES NFR BLD AUTO: 8.7 % — SIGNIFICANT CHANGE UP (ref 2–14)
NEUTROPHILS # BLD AUTO: 14.28 K/UL — HIGH (ref 1.8–7.4)
NEUTROPHILS # BLD AUTO: 6.21 K/UL — SIGNIFICANT CHANGE UP (ref 1.8–7.4)
NEUTROPHILS # BLD AUTO: 8.05 K/UL — HIGH (ref 1.8–7.4)
NEUTROPHILS NFR BLD AUTO: 69 % — SIGNIFICANT CHANGE UP (ref 43–77)
NEUTROPHILS NFR BLD AUTO: 88 % — HIGH (ref 43–77)
NEUTROPHILS NFR BLD AUTO: 89.2 % — HIGH (ref 43–77)
NIGHT BLUE STAIN TISS: SIGNIFICANT CHANGE UP
NRBC # BLD: 1 /100 WBCS — HIGH (ref 0–0)
NRBC # BLD: 2 /100 WBCS — HIGH (ref 0–0)
NRBC # FLD: 0.14 K/UL — HIGH (ref 0–0)
NRBC # FLD: 0.22 K/UL — HIGH (ref 0–0)
OXYGEN SATURATION, PRE MEMBRANE VENOUS: 72.8 % — SIGNIFICANT CHANGE UP (ref 60–85)
OXYGEN SATURATION, PRE MEMBRANE VENOUS: 76.1 % — SIGNIFICANT CHANGE UP (ref 60–85)
OXYHEMOGLOBIN, PRE MEMBRANE VENOUS: 70.8 % — SIGNIFICANT CHANGE UP (ref 59–84)
OXYHEMOGLOBIN, PRE MEMBRANE VENOUS: 74 % — SIGNIFICANT CHANGE UP (ref 59–84)
PCO2, PRE MEMBRANE VENOUS: 47 MMHG — HIGH (ref 39–42)
PCO2, PRE MEMBRANE VENOUS: 49 MMHG — HIGH (ref 39–42)
PH, PRE MEMBRANE VENOUS: 7.46 — HIGH (ref 7.32–7.43)
PH, PRE MEMBRANE VENOUS: 7.46 — HIGH (ref 7.32–7.43)
PHOSPHATE SERPL-MCNC: 2.4 MG/DL — LOW (ref 2.5–4.5)
PHOSPHATE SERPL-MCNC: 3.6 MG/DL — SIGNIFICANT CHANGE UP (ref 2.5–4.5)
PLATELET # BLD AUTO: 43 K/UL — LOW (ref 150–400)
PLATELET # BLD AUTO: 58 K/UL — LOW (ref 150–400)
PLATELET # BLD AUTO: 64 K/UL — LOW (ref 150–400)
PO2, PRE MEMBRANE VENOUS: 45 MMHG — HIGH (ref 35–40)
PO2, PRE MEMBRANE VENOUS: 48 MMHG — HIGH (ref 35–40)
POTASSIUM SERPL-MCNC: 4.5 MMOL/L — SIGNIFICANT CHANGE UP (ref 3.5–5.3)
POTASSIUM SERPL-MCNC: 4.7 MMOL/L — SIGNIFICANT CHANGE UP (ref 3.5–5.3)
POTASSIUM SERPL-SCNC: 4.5 MMOL/L — SIGNIFICANT CHANGE UP (ref 3.5–5.3)
POTASSIUM SERPL-SCNC: 4.7 MMOL/L — SIGNIFICANT CHANGE UP (ref 3.5–5.3)
PROT SERPL-MCNC: 5 G/DL — LOW (ref 6–8.3)
PROT SERPL-MCNC: 5 G/DL — LOW (ref 6–8.3)
PROTHROM AB SERPL-ACNC: 16.3 SEC — HIGH (ref 9.5–13)
PROTHROM AB SERPL-ACNC: 16.6 SEC — HIGH (ref 9.5–13)
RBC # BLD: 3.68 M/UL — LOW (ref 3.8–5.2)
RBC # BLD: 3.82 M/UL — SIGNIFICANT CHANGE UP (ref 3.8–5.2)
RBC # BLD: 3.84 M/UL — SIGNIFICANT CHANGE UP (ref 3.8–5.2)
RBC # FLD: 17.4 % — HIGH (ref 10.3–14.5)
RBC # FLD: 17.8 % — HIGH (ref 10.3–14.5)
RBC # FLD: 17.8 % — HIGH (ref 10.3–14.5)
SODIUM SERPL-SCNC: 137 MMOL/L — SIGNIFICANT CHANGE UP (ref 135–145)
SODIUM SERPL-SCNC: 139 MMOL/L — SIGNIFICANT CHANGE UP (ref 135–145)
SPECIMEN SOURCE: SIGNIFICANT CHANGE UP
TOTAL HEMOGLOBIN, PRE MEMBRANE VENOUS: 11.4 G/DL — LOW (ref 11.5–15.5)
TOTAL HEMOGLOBIN, PRE MEMBRANE VENOUS: 11.6 G/DL — SIGNIFICANT CHANGE UP (ref 11.5–15.5)
TRIGL SERPL-MCNC: 120 MG/DL — SIGNIFICANT CHANGE UP
UNFRACTIONATED HEPARIN INTERPRETATION: SIGNIFICANT CHANGE UP
UNFRACTIONATED HEPARIN RESULT: NEGATIVE — SIGNIFICANT CHANGE UP
UNFRACTIONATED HEPARIN-HIGH DOSE: 0 % — SIGNIFICANT CHANGE UP
UNFRACTIONATED HEPARIN-LOW DOSE: 0 % — SIGNIFICANT CHANGE UP
WBC # BLD: 16.01 K/UL — HIGH (ref 3.8–10.5)
WBC # BLD: 7.96 K/UL — SIGNIFICANT CHANGE UP (ref 3.8–10.5)
WBC # BLD: 9.16 K/UL — SIGNIFICANT CHANGE UP (ref 3.8–10.5)
WBC # FLD AUTO: 16.01 K/UL — HIGH (ref 3.8–10.5)
WBC # FLD AUTO: 7.96 K/UL — SIGNIFICANT CHANGE UP (ref 3.8–10.5)
WBC # FLD AUTO: 9.16 K/UL — SIGNIFICANT CHANGE UP (ref 3.8–10.5)

## 2023-09-21 PROCEDURE — 33984 ECMO/ECLS RMVL PRPH CANNULA: CPT | Mod: AS

## 2023-09-21 PROCEDURE — 99233 SBSQ HOSP IP/OBS HIGH 50: CPT

## 2023-09-21 PROCEDURE — 93970 EXTREMITY STUDY: CPT | Mod: 26

## 2023-09-21 PROCEDURE — 93971 EXTREMITY STUDY: CPT | Mod: 26,59

## 2023-09-21 RX ORDER — ACETAMINOPHEN 500 MG
1000 TABLET ORAL ONCE
Refills: 0 | Status: COMPLETED | OUTPATIENT
Start: 2023-09-21 | End: 2023-09-21

## 2023-09-21 RX ORDER — HYDROMORPHONE HYDROCHLORIDE 2 MG/ML
2 INJECTION INTRAMUSCULAR; INTRAVENOUS; SUBCUTANEOUS ONCE
Refills: 0 | Status: DISCONTINUED | OUTPATIENT
Start: 2023-09-21 | End: 2023-09-21

## 2023-09-21 RX ORDER — POTASSIUM PHOSPHATE, MONOBASIC POTASSIUM PHOSPHATE, DIBASIC 236; 224 MG/ML; MG/ML
15 INJECTION, SOLUTION INTRAVENOUS ONCE
Refills: 0 | Status: DISCONTINUED | OUTPATIENT
Start: 2023-09-21 | End: 2023-09-21

## 2023-09-21 RX ORDER — PHENYLEPHRINE HYDROCHLORIDE 10 MG/ML
0.5 INJECTION INTRAVENOUS
Qty: 40 | Refills: 0 | Status: DISCONTINUED | OUTPATIENT
Start: 2023-09-21 | End: 2023-09-25

## 2023-09-21 RX ADMIN — CHLORHEXIDINE GLUCONATE 15 MILLILITER(S): 213 SOLUTION TOPICAL at 05:36

## 2023-09-21 RX ADMIN — Medication 2: at 18:03

## 2023-09-21 RX ADMIN — Medication 1000 MILLIGRAM(S): at 23:46

## 2023-09-21 RX ADMIN — Medication 1 DROP(S): at 05:49

## 2023-09-21 RX ADMIN — Medication 1 DROP(S): at 18:11

## 2023-09-21 RX ADMIN — HUMAN INSULIN 11 UNIT(S): 100 INJECTION, SUSPENSION SUBCUTANEOUS at 18:04

## 2023-09-21 RX ADMIN — Medication 15 MILLILITER(S): at 12:29

## 2023-09-21 RX ADMIN — ARGATROBAN 1.38 MICROGRAM(S)/KG/MIN: 50 INJECTION, SOLUTION INTRAVENOUS at 14:48

## 2023-09-21 RX ADMIN — SENNA PLUS 10 MILLILITER(S): 8.6 TABLET ORAL at 18:03

## 2023-09-21 RX ADMIN — Medication 85 MILLIMOLE(S): at 05:35

## 2023-09-21 RX ADMIN — DEXMEDETOMIDINE HYDROCHLORIDE IN 0.9% SODIUM CHLORIDE 6.38 MICROGRAM(S)/KG/HR: 4 INJECTION INTRAVENOUS at 19:23

## 2023-09-21 RX ADMIN — CHLORHEXIDINE GLUCONATE 1 APPLICATION(S): 213 SOLUTION TOPICAL at 05:36

## 2023-09-21 RX ADMIN — POLYETHYLENE GLYCOL 3350 17 GRAM(S): 17 POWDER, FOR SOLUTION ORAL at 14:48

## 2023-09-21 RX ADMIN — CHLORHEXIDINE GLUCONATE 15 MILLILITER(S): 213 SOLUTION TOPICAL at 18:03

## 2023-09-21 RX ADMIN — POLYETHYLENE GLYCOL 3350 17 GRAM(S): 17 POWDER, FOR SOLUTION ORAL at 05:35

## 2023-09-21 RX ADMIN — PHENYLEPHRINE HYDROCHLORIDE 23.9 MICROGRAM(S)/KG/MIN: 10 INJECTION INTRAVENOUS at 12:26

## 2023-09-21 RX ADMIN — Medication 3 MILLILITER(S): at 03:54

## 2023-09-21 RX ADMIN — POLYETHYLENE GLYCOL 3350 17 GRAM(S): 17 POWDER, FOR SOLUTION ORAL at 22:47

## 2023-09-21 RX ADMIN — Medication 12.5 MILLIGRAM(S): at 05:36

## 2023-09-21 RX ADMIN — ATOVAQUONE 1500 MILLIGRAM(S): 750 SUSPENSION ORAL at 12:30

## 2023-09-21 RX ADMIN — PHENYLEPHRINE HYDROCHLORIDE 23.9 MICROGRAM(S)/KG/MIN: 10 INJECTION INTRAVENOUS at 19:24

## 2023-09-21 RX ADMIN — Medication 400 MILLIGRAM(S): at 12:30

## 2023-09-21 RX ADMIN — Medication 400 MILLIGRAM(S): at 22:46

## 2023-09-21 RX ADMIN — HUMAN INSULIN 11 UNIT(S): 100 INJECTION, SUSPENSION SUBCUTANEOUS at 12:27

## 2023-09-21 RX ADMIN — ARGATROBAN 1.53 MICROGRAM(S)/KG/MIN: 50 INJECTION, SOLUTION INTRAVENOUS at 19:24

## 2023-09-21 RX ADMIN — Medication 1 MILLIGRAM(S): at 12:29

## 2023-09-21 RX ADMIN — SENNA PLUS 10 MILLILITER(S): 8.6 TABLET ORAL at 05:36

## 2023-09-21 RX ADMIN — Medication 80 MILLIGRAM(S): at 05:36

## 2023-09-21 RX ADMIN — Medication 12.5 MILLIGRAM(S): at 18:03

## 2023-09-21 RX ADMIN — HYDROMORPHONE HYDROCHLORIDE 2 MILLIGRAM(S): 2 INJECTION INTRAMUSCULAR; INTRAVENOUS; SUBCUTANEOUS at 11:00

## 2023-09-21 RX ADMIN — Medication 3 MILLILITER(S): at 08:44

## 2023-09-21 RX ADMIN — DEXMEDETOMIDINE HYDROCHLORIDE IN 0.9% SODIUM CHLORIDE 6.38 MICROGRAM(S)/KG/HR: 4 INJECTION INTRAVENOUS at 12:25

## 2023-09-21 RX ADMIN — Medication 2: at 12:26

## 2023-09-21 RX ADMIN — Medication 3 MILLILITER(S): at 15:16

## 2023-09-21 RX ADMIN — Medication 3 MILLILITER(S): at 20:24

## 2023-09-21 RX ADMIN — PANTOPRAZOLE SODIUM 40 MILLIGRAM(S): 20 TABLET, DELAYED RELEASE ORAL at 12:30

## 2023-09-21 NOTE — CHART NOTE - NSCHARTNOTEFT_GEN_A_CORE
NUTRITION FOLLOW-UP        63yo F with ARDS, intubated/sedated & cannulated (9/13). Transferred to [facility] for VV-ECMO.    Pt. with possible MCTD vs SLE.  Pt. decannulated today (9/21).    Pt. without any noted/reported intolerance to TF @ this time (no vomiting/diarrhea/constipation or abdominal distention).   BM (9/20).    Changed to Pivot 1.5 TF yesterday which was initiated.  Spoke w RN. However, this enteral formula is now unavailable @ this time.  Discussed with     Considering persistently elevated glucose values, Glucerna 1.5 deemed most appropriate at this time... suggested goal rate for Glucerna 1.5 is 45mL/hr    Additional protein supplementation w NoCarb Prosource (3 packets/d) remains warranted.      _________________Diet___________________  Diet, NPO with Tube Feed:   Tube Feeding Modality: Nasogastric  Pivot 1.5 Marvin (PIVOT1.5RTH)  Total Volume for 24 Hours (mL): 960  Continuous  Starting Tube Feed Rate {mL per Hour}: 30  Increase Tube Feed Rate by (mL): 10     Every 4 hours  Until Goal Tube Feed Rate (mL per Hour): 40  Tube Feed Duration (in Hours): 24  Tube Feed Start Time: 10:00  No Carb Prosource (1pkg = 15gms Protein)     Qty per Day:  3 (09-20-23 @ 14:43) [Active]    Weight:                    Height:  68"              Upper 10% Ideal Body Weight: 154lbs / 69.8kg  131.7kg (9/21)  133.9kg (9/20)  131.6kg (9/17)  127.6kg (9/13 on admit)          _____Estimated Energy Needs______  11-14 kcals/kg admit weight = 3064-6595 kcals/d  1.8-2.0g protein/kg Ideal Body Weight = 126-140g protein/d      Skin: No pressure injuries        ________________________Pertinent Medications____________   MEDICATIONS  (STANDING):  albuterol/ipratropium for Nebulization 3 milliLiter(s) Nebulizer every 6 hours  argatroban Infusion 0.18 MICROgram(s)/kG/Min (1.38 mL/Hr) IV Continuous <Continuous>  artificial  tears Solution 1 Drop(s) Both EYES every 12 hours  atovaquone  Suspension 1500 milliGRAM(s) Oral daily  chlorhexidine 0.12% Liquid 15 milliLiter(s) Oral Mucosa every 12 hours  chlorhexidine 2% Cloths 1 Application(s) Topical <User Schedule>  dexMEDEtomidine Infusion 0.2 MICROgram(s)/kG/Hr (6.38 mL/Hr) IV Continuous <Continuous>  dextrose 5%. 1000 milliLiter(s) (50 mL/Hr) IV Continuous <Continuous>  dextrose 50% Injectable 12.5 Gram(s) IV Push once  dextrose 50% Injectable 25 Gram(s) IV Push once  dextrose 50% Injectable 25 Gram(s) IV Push once  folic acid 1 milliGRAM(s) Oral daily  glucagon  Injectable 1 milliGRAM(s) IntraMuscular once  insulin lispro (ADMELOG) corrective regimen sliding scale   SubCutaneous every 6 hours  insulin NPH human recombinant 11 Unit(s) SubCutaneous every 6 hours  methylPREDNISolone sodium succinate Injectable 80 milliGRAM(s) IV Push daily  metoprolol tartrate 12.5 milliGRAM(s) Oral two times a day  multivitamin/minerals/iron Oral Solution (CENTRUM) 15 milliLiter(s) Oral daily  pantoprazole  Injectable 40 milliGRAM(s) IV Push daily  phenylephrine    Infusion 0.499 MICROgram(s)/kG/Min (23.9 mL/Hr) IV Continuous <Continuous>  polyethylene glycol 3350 17 Gram(s) Oral <User Schedule>  senna Syrup 10 milliLiter(s) Oral two times a day    MEDICATIONS  (PRN):  dextrose Oral Gel 15 Gram(s) Oral once PRN Blood Glucose LESS THAN 70 milliGRAM(s)/deciliter  diphenhydrAMINE Injectable 50 milliGRAM(s) IV Push once PRN chemotherapy reaction  meperidine     Injectable 25 milliGRAM(s) IV Push once PRN chemotherapy reaction          _________________________Pertinent Labs____________________     09-21    137  |  99  |  39<H>  ----------------------------<  245<H>  4.7   |  29  |  0.36<L>    Ca    8.8      21 Sep 2023 10:15  Phos  3.6     09-21  Mg     2.20     09-21    TPro  5.0<L>  /  Alb  3.2<L>  /  TBili  15.8<H>  /  DBili  x   /  AST  123<H>  /  ALT  210<H>  /  AlkPhos  337<H>  09-21                                                                   11.2   9.16  )-----------( 58       ( 21 Sep 2023 10:15 )             34.9         CAPILLARY BLOOD GLUCOSE      POCT Blood Glucose.: 241 mg/dL (21 Sep 2023 12:23)    POCT Blood Glucose.: 241 mg/dL (09-21-23 @ 12:23)  POCT Blood Glucose.: 93 mg/dL (09-21-23 @ 05:40)  POCT Blood Glucose.: 154 mg/dL (09-20-23 @ 23:04)  POCT Blood Glucose.: 189 mg/dL (09-20-23 @ 16:25)              PLAN/RECOMMENDATIONS:    1)  D/C Pivot 1.5.  Change to Glucerna 1.5 with goal of 45mL/hr x 24hrs + 3 packets NoCarb Prosource per day    1080mL total volume  1620 kcals  89g protein (+ 45g additional protein via NoCarb Prosource)= 134g total protein   820mL free water     2) Obtain daily weights  3) Monitor tolerance to TF, GI status, electrolytes, glucose     RDN remains available and will f/u PRN.          Marjorie Kidd, RDN, CDN       pager [phone] or MS Teams

## 2023-09-21 NOTE — PROGRESS NOTE ADULT - SUBJECTIVE AND OBJECTIVE BOX
Follow Up:      Inverval History/ROS:Patient is a 62y old  Female who presents with a chief complaint of VV ECMO (21 Sep 2023 06:07)    Off ECMo  intubated    Allergies    No Known Allergies    Intolerances        ANTIMICROBIALS:  atovaquone  Suspension 1500 daily      OTHER MEDS:  albuterol/ipratropium for Nebulization 3 milliLiter(s) Nebulizer every 6 hours  argatroban Infusion 0.18 MICROgram(s)/kG/Min IV Continuous <Continuous>  artificial  tears Solution 1 Drop(s) Both EYES every 12 hours  chlorhexidine 0.12% Liquid 15 milliLiter(s) Oral Mucosa every 12 hours  chlorhexidine 2% Cloths 1 Application(s) Topical <User Schedule>  dexMEDEtomidine Infusion 0.2 MICROgram(s)/kG/Hr IV Continuous <Continuous>  dextrose 5%. 1000 milliLiter(s) IV Continuous <Continuous>  dextrose 50% Injectable 12.5 Gram(s) IV Push once  dextrose 50% Injectable 25 Gram(s) IV Push once  dextrose 50% Injectable 25 Gram(s) IV Push once  dextrose Oral Gel 15 Gram(s) Oral once PRN  diphenhydrAMINE Injectable 50 milliGRAM(s) IV Push once PRN  folic acid 1 milliGRAM(s) Oral daily  glucagon  Injectable 1 milliGRAM(s) IntraMuscular once  insulin lispro (ADMELOG) corrective regimen sliding scale   SubCutaneous every 6 hours  insulin NPH human recombinant 11 Unit(s) SubCutaneous every 6 hours  meperidine     Injectable 25 milliGRAM(s) IV Push once PRN  methylPREDNISolone sodium succinate Injectable 80 milliGRAM(s) IV Push daily  metoprolol tartrate 12.5 milliGRAM(s) Oral two times a day  multivitamin/minerals/iron Oral Solution (CENTRUM) 15 milliLiter(s) Oral daily  pantoprazole  Injectable 40 milliGRAM(s) IV Push daily  phenylephrine    Infusion 0.499 MICROgram(s)/kG/Min IV Continuous <Continuous>  polyethylene glycol 3350 17 Gram(s) Oral <User Schedule>  senna Syrup 10 milliLiter(s) Oral two times a day      Vital Signs Last 24 Hrs  T(C): 36.1 (21 Sep 2023 12:00), Max: 36.2 (21 Sep 2023 04:00)  T(F): 97 (21 Sep 2023 12:00), Max: 97.2 (21 Sep 2023 04:00)  HR: 82 (21 Sep 2023 13:00) (60 - 133)  BP: --  BP(mean): --  RR: 21 (21 Sep 2023 13:00) (13 - 33)  SpO2: 96% (21 Sep 2023 13:00) (95% - 100%)    Parameters below as of 21 Sep 2023 13:00  Patient On (Oxygen Delivery Method): ventilator, cpap    O2 Concentration (%): 40    PHYSICAL EXAM:  General: [x ] intubated  HEAD/EYES: [ ] PERRL [ ] white sclera [x ] icterus  ENT:  [ ] normal [ ] supple [ ] thrush [ ] pharyngeal exudate  Cardiovascular:   [ ] murmur [x ] normal [ ] PPM/AICD  Respiratory:  [x ] clear to ausculation bilaterally  GI:  [ x] soft, non-tender, normal bowel sounds  :  [ ] casey [ ] no CVA tenderness   Musculoskeletal:  [x ] no synovitis  Neurologic:  [ ] non-focal exam   Skin:  [ x] no rash  Lymph: [ x] no lymphadenopathy  Psychiatric:  [ ] appropriate affect [ ] alert & oriented  Lines:  [x ] no phlebitis [ ] central line                                11.2   9.16  )-----------( 58       ( 21 Sep 2023 10:15 )             34.9       09-21    137  |  99  |  39<H>  ----------------------------<  245<H>  4.7   |  29  |  0.36<L>    Ca    8.8      21 Sep 2023 10:15  Phos  3.6     09-21  Mg     2.20     09-21    TPro  5.0<L>  /  Alb  3.2<L>  /  TBili  15.8<H>  /  DBili  x   /  AST  123<H>  /  ALT  210<H>  /  AlkPhos  337<H>  09-21      Urinalysis Basic - ( 21 Sep 2023 10:15 )    Color: x / Appearance: x / SG: x / pH: x  Gluc: 245 mg/dL / Ketone: x  / Bili: x / Urobili: x   Blood: x / Protein: x / Nitrite: x   Leuk Esterase: x / RBC: x / WBC x   Sq Epi: x / Non Sq Epi: x / Bacteria: x        MICROBIOLOGY:Culture Results:   Testing in progress (09-20-23 @ 16:39)  Culture Results:   Normal Respiratory Noreen present (09-19-23 @ 10:40)  Culture Results:   No growth at 4 days (09-16-23 @ 10:30)  Culture Results:   No growth at 4 days (09-16-23 @ 10:25)  Culture Results:   Normal Respiratory Noreen present (09-16-23 @ 09:00)      RADIOLOGY:

## 2023-09-21 NOTE — PROCEDURE NOTE - ADDITIONAL PROCEDURE DETAILS
Critically ill pt requiring PIV access for medical management and/or pressor support. Under US guidance identified Rt cephalic vein, and successfully placed 18g x 5.71cm AccuCath  vessel.  Placement confirmed s/p with ultrasound and catheter determined to be in patent lumen of vein. Pt tolerated well w/o complication. Lot#

## 2023-09-21 NOTE — PROGRESS NOTE ADULT - SUBJECTIVE AND OBJECTIVE BOX
Interval Events:    HPI:  62 year old female with a past medical history of afib, and sciatica, who presented to [facility] for shortness of breath. She had gone to [facility] where she had CXR which showed bilateral lower lobe infiltrates so was sent to ER. She had been having exertional dyspnea and hypoxemia (on home pulse ox to 82%). She had been having dyspnea for several months and had a workup in [place] with a CT scan (which was negative for PE). She also states that she was seen by a pulmonologist and rheumatology, where they ruled out lupus and other immunological disorders.    [facility] Hospital Course  Found to be hypoxic and have interstitial lung disease appearance on CT, admitted 8/26 for AHRF, further ILD workup and management. TTE 8/26 with normal LVEF. Pt was started on prednisone on admission with gradually improving O2 requirement and has been stable on 2-3LNC since 9/3. Started steroid taper on 9/6 and fully transitioned to PO 9/8 overnight. Started on Mycophenolate mofetil (cellcept) 9/8 evening but pt reported subjective side effects with weakness. Episode of AF w RVR with HR up to 140s on 9/11 am, decreased to HR 10-110s with IV lopressor 10. CTA negative for PE and showed interval improvements in GGO but possible lingular bleb and RML bronchiectasis. Patient received 2L of but before more fluids and beta-blocker pt developed heart palpitations with EKG HR 170s with SVT. BPs 105/70s. Did not respond to vagal maneuvers. Pt given oral lopressor 12.5 and IV lopressor 5, with improvement of HR to 130s. SBP briefly dropped to 60-70s then recovered. Patient on NRB offered HFNC/BiPAP but refused. Started on empiric vancomycin and zosyn. BCx w/ NGTD. Per pulm increased solumedrol to 40mg qd. Patient acceded to HFNC in which she desatted and presented with increased wob for which she was transitioned to BiPAP. Patient with anxiety while on BiPAP presenting tachypnea and desatting to the 80s despite verbal redirection for which she was administered ativan 1mg IVP x1. Patient distress improved with sats in the low 90s but had desaturation to 70s. STAT CXR showed increased pulmonary congestion for which patient was administered lasix without improvement. Intubated and started on mechanical ventilation but required very high PEEP (18) and 100% FiO2 and still had low saturations. VV ECMO team consulted and accepted to [facility] center. Per signout patient had RV enlargement and dysfunction on POCUS with concern for either new PE vs high pulmonary pressures due to PEEP 18 and lung dysfunction. Patient went for cannulation at [facility] with flouro showing no acute PE. Cannulated with 25 F drainage cannula in R Fem V and 23F “arterial” cannula in RIJ.    (13 Sep 2023 15:24)      REVIEW OF SYSTEMS:    [ ] Unable to assess ROS because ________    OBJECTIVE:  ICU Vital Signs Last 24 Hrs  T(C): 36.2 (21 Sep 2023 04:00), Max: 36.2 (21 Sep 2023 04:00)  T(F): 97.2 (21 Sep 2023 04:00), Max: 97.2 (21 Sep 2023 04:00)  HR: 74 (21 Sep 2023 06:00) (74 - 191)  BP: 156/88 (20 Sep 2023 08:00) (156/88 - 156/88)  BP(mean): 104 (20 Sep 2023 08:00) (104 - 104)  ABP: 132/71 (21 Sep 2023 06:00) (78/55 - 159/104)  ABP(mean): 96 (21 Sep 2023 06:00) (63 - 128)  RR: 26 (21 Sep 2023 06:00) (12 - 29)  SpO2: 99% (21 Sep 2023 06:00) (95% - 100%)    O2 Parameters below as of 21 Sep 2023 04:00  Patient On (Oxygen Delivery Method): BiPAP/CPAP, 12/12    O2 Concentration (%): 40      Mode: CPAP with PS, FiO2: 40, PEEP: 12, MAP: 15, PC: 12, PIP: 25    09-19 @ 07:01  -  09-20 @ 07:00  --------------------------------------------------------  IN: 4426.7 mL / OUT: 4400 mL / NET: 26.7 mL    09-20 @ 07:01 - 09-21 @ 06:08  --------------------------------------------------------  IN: 4845.5 mL / OUT: 4250 mL / NET: 595.5 mL      CAPILLARY BLOOD GLUCOSE      POCT Blood Glucose.: 93 mg/dL (21 Sep 2023 05:40)      Physical Exam:                     HOSPITAL MEDICATIONS:  Standing Meds:  albuterol/ipratropium for Nebulization 3 milliLiter(s) Nebulizer every 6 hours  argatroban Infusion 0.18 MICROgram(s)/kG/Min IV Continuous <Continuous>  artificial  tears Solution 1 Drop(s) Both EYES every 12 hours  atovaquone  Suspension 1500 milliGRAM(s) Oral daily  chlorhexidine 0.12% Liquid 15 milliLiter(s) Oral Mucosa every 12 hours  chlorhexidine 2% Cloths 1 Application(s) Topical <User Schedule>  dexMEDEtomidine Infusion 0.2 MICROgram(s)/kG/Hr IV Continuous <Continuous>  dextrose 5%. 1000 milliLiter(s) IV Continuous <Continuous>  dextrose 50% Injectable 25 Gram(s) IV Push once  dextrose 50% Injectable 25 Gram(s) IV Push once  dextrose 50% Injectable 12.5 Gram(s) IV Push once  filgrastim-sndz (ZARXIO) Injectable 480 MICROGram(s) SubCutaneous daily  folic acid 1 milliGRAM(s) Oral daily  glucagon  Injectable 1 milliGRAM(s) IntraMuscular once  insulin lispro (ADMELOG) corrective regimen sliding scale   SubCutaneous every 6 hours  insulin NPH human recombinant 11 Unit(s) SubCutaneous every 6 hours  methylPREDNISolone sodium succinate Injectable 80 milliGRAM(s) IV Push daily  metoprolol tartrate 12.5 milliGRAM(s) Oral two times a day  multivitamin/minerals/iron Oral Solution (CENTRUM) 15 milliLiter(s) Oral daily  pantoprazole  Injectable 40 milliGRAM(s) IV Push daily  phenylephrine    Infusion 0.5 MICROgram(s)/kG/Min IV Continuous <Continuous>  polyethylene glycol 3350 17 Gram(s) Oral <User Schedule>  senna Syrup 10 milliLiter(s) Oral two times a day      PRN Meds:  dextrose Oral Gel 15 Gram(s) Oral once PRN  diphenhydrAMINE Injectable 50 milliGRAM(s) IV Push once PRN  meperidine     Injectable 25 milliGRAM(s) IV Push once PRN      LABS:                        11.6   7.96  )-----------( 43       ( 21 Sep 2023 04:15 )             35.9     Hgb Trend: 11.6<--, 11.7<--, 11.6<--, 11.6<--, 11.2<--  09-21    139  |  101  |  40<H>  ----------------------------<  158<H>  4.5   |  30  |  0.35<L>    Ca    9.1      21 Sep 2023 04:15  Phos  2.4     09-21  Mg     2.30     09-21    TPro  5.0<L>  /  Alb  3.2<L>  /  TBili  14.3<H>  /  DBili  x   /  AST  119<H>  /  ALT  197<H>  /  AlkPhos  295<H>  09-21    Creatinine Trend: 0.35<--, 0.35<--, 0.33<--, 0.33<--, 0.37<--, 0.37<--  PT/INR - ( 21 Sep 2023 04:15 )   PT: 16.6 sec;   INR: 1.49 ratio         PTT - ( 21 Sep 2023 04:15 )  PTT:56.7 sec  Urinalysis Basic - ( 21 Sep 2023 04:15 )    Color: x / Appearance: x / SG: x / pH: x  Gluc: 158 mg/dL / Ketone: x  / Bili: x / Urobili: x   Blood: x / Protein: x / Nitrite: x   Leuk Esterase: x / RBC: x / WBC x   Sq Epi: x / Non Sq Epi: x / Bacteria: x      Arterial Blood Gas:  09-21 @ 04:15  7.50/42/133/33/99.2/8.7  ABG lactate: --  Arterial Blood Gas:  09-20 @ 23:05  7.49/45/93/34/96.9/9.7  ABG lactate: --  Arterial Blood Gas:  09-20 @ 18:30  7.53/39/91/33/99.0/9.2  ABG lactate: --  Arterial Blood Gas:  09-20 @ 16:15  7.53/42/84/35/97.3/11.3  ABG lactate: --  Arterial Blood Gas:  09-20 @ 10:40  7.48/42/96/31/98.4/7.1  ABG lactate: --  Arterial Blood Gas:  09-20 @ 09:00  7.46/46/99/33/97.5/7.8  ABG lactate: --  Arterial Blood Gas:  09-20 @ 04:46  7.46/49/158/35/99.5/9.6  ABG lactate: --  Arterial Blood Gas:  09-19 @ 23:25  7.50/43/137/34/99.2/9.3  ABG lactate: --  Arterial Blood Gas:  09-19 @ 16:40  7.48/44/113/33/98.8/8.3  ABG lactate: --  Arterial Blood Gas:  09-19 @ 10:25  7.50/47/78/37/96.5/11.9  ABG lactate: --  Arterial Blood Gas:  09-19 @ 08:14  7.54/44/123/38/99.1/13.6  ABG lactate: --        MICROBIOLOGY:     Culture - Bronchial (collected 20 Sep 2023 16:39)  Source: .Bronchial Bronchial Lavage  Gram Stain (20 Sep 2023 23:41):    No polymorphonuclear cells seen per low power field    No squamous epithelial cells per low power field    Rare Yeast per oil power field    Culture - Sputum (collected 19 Sep 2023 10:40)  Source: ET Tube ET Tube  Gram Stain (19 Sep 2023 23:20):    Few polymorphonuclear leukocytes per low power field    Few Squamous epithelial cells per low power field    Moderate Yeast per oil power field    Moderate Gram Positive Cocci in Clusters per oil power field  Preliminary Report (20 Sep 2023 16:46):    Normal Respiratory Noreen present      RADIOLOGY:  [ ] Reviewed and interpreted by me       Interval Events:  -1 u PLT   -tolerated minimal ecmo settings     HPI:  62 year old female with a past medical history of afib, and sciatica, who presented to [facility] for shortness of breath. She had gone to [facility] where she had CXR which showed bilateral lower lobe infiltrates so was sent to ER. She had been having exertional dyspnea and hypoxemia (on home pulse ox to 82%). She had been having dyspnea for several months and had a workup in [place] with a CT scan (which was negative for PE). She also states that she was seen by a pulmonologist and rheumatology, where they ruled out lupus and other immunological disorders.    [facility] Hospital Course  Found to be hypoxic and have interstitial lung disease appearance on CT, admitted 8/26 for AHRF, further ILD workup and management. TTE 8/26 with normal LVEF. Pt was started on prednisone on admission with gradually improving O2 requirement and has been stable on 2-3LNC since 9/3. Started steroid taper on 9/6 and fully transitioned to PO 9/8 overnight. Started on Mycophenolate mofetil (cellcept) 9/8 evening but pt reported subjective side effects with weakness. Episode of AF w RVR with HR up to 140s on 9/11 am, decreased to HR 10-110s with IV lopressor 10. CTA negative for PE and showed interval improvements in GGO but possible lingular bleb and RML bronchiectasis. Patient received 2L of but before more fluids and beta-blocker pt developed heart palpitations with EKG HR 170s with SVT. BPs 105/70s. Did not respond to vagal maneuvers. Pt given oral lopressor 12.5 and IV lopressor 5, with improvement of HR to 130s. SBP briefly dropped to 60-70s then recovered. Patient on NRB offered HFNC/BiPAP but refused. Started on empiric vancomycin and zosyn. BCx w/ NGTD. Per pulm increased solumedrol to 40mg qd. Patient acceded to HFNC in which she desatted and presented with increased wob for which she was transitioned to BiPAP. Patient with anxiety while on BiPAP presenting tachypnea and desatting to the 80s despite verbal redirection for which she was administered ativan 1mg IVP x1. Patient distress improved with sats in the low 90s but had desaturation to 70s. STAT CXR showed increased pulmonary congestion for which patient was administered lasix without improvement. Intubated and started on mechanical ventilation but required very high PEEP (18) and 100% FiO2 and still had low saturations. VV ECMO team consulted and accepted to [facility] Injury center. Per signout patient had RV enlargement and dysfunction on POCUS with concern for either new PE vs high pulmonary pressures due to PEEP 18 and lung dysfunction. Patient went for cannulation at [facility] with flouro showing no acute PE. Cannulated with 25 F drainage cannula in R Fem V and 23F “arterial” cannula in RIJ.    (13 Sep 2023 15:24)      REVIEW OF SYSTEMS:    [ x] Unable to assess ROS because patient is intubated can only answer yes or no questions at this time     OBJECTIVE:  ICU Vital Signs Last 24 Hrs  T(C): 36.2 (21 Sep 2023 04:00), Max: 36.2 (21 Sep 2023 04:00)  T(F): 97.2 (21 Sep 2023 04:00), Max: 97.2 (21 Sep 2023 04:00)  HR: 74 (21 Sep 2023 06:00) (74 - 191)  BP: 156/88 (20 Sep 2023 08:00) (156/88 - 156/88)  BP(mean): 104 (20 Sep 2023 08:00) (104 - 104)  ABP: 132/71 (21 Sep 2023 06:00) (78/55 - 159/104)  ABP(mean): 96 (21 Sep 2023 06:00) (63 - 128)  RR: 26 (21 Sep 2023 06:00) (12 - 29)  SpO2: 99% (21 Sep 2023 06:00) (95% - 100%)    O2 Parameters below as of 21 Sep 2023 04:00  Patient On (Oxygen Delivery Method): BiPAP/CPAP, 12/12    O2 Concentration (%): 40      Mode: CPAP with PS, FiO2: 40, PEEP: 12, MAP: 15, PC: 12, PIP: 25    09-19 @ 07:01  -  09-20 @ 07:00  --------------------------------------------------------  IN: 4426.7 mL / OUT: 4400 mL / NET: 26.7 mL    09-20 @ 07:01  -  09-21 @ 06:08  --------------------------------------------------------  IN: 4845.5 mL / OUT: 4250 mL / NET: 595.5 mL      CAPILLARY BLOOD GLUCOSE      POCT Blood Glucose.: 93 mg/dL (21 Sep 2023 05:40)      Physical Exam:   Constitutional: ill appearing, no acute distress   HEENT: + PERRLA, EOMI, no drainage or redness  Neck: Right IJ ecmo cannula and left IJ TLC in place, supple, No JVD  Respiratory: Ventilator assisted breath Sounds dimished bilaterally to auscultation, no accessory muscle use noted  Cardiovascular: regular rate, irregular rhythm, normal S1, S2; no murmurs or rub  Gastrointestinal: obese, soft, non-tender, non distended, no hepatosplenomegaly, normal bowel sounds  Extremities: + 2 peripheral edema, no cyanosis, no clubbing   Vascular: Equal and normal pulses: 2+ peripheral pulses throughout  Neurological: sedated/intubated, moves extremities   Psychiatric: calm, normal mood, normal affect  Musculoskeletal: No joint swelling or deformity; no limitation of movement  Skin: warm, dry, well perfused, no rashes, some ecchymotic areas       HOSPITAL MEDICATIONS:  Standing Meds:  albuterol/ipratropium for Nebulization 3 milliLiter(s) Nebulizer every 6 hours  argatroban Infusion 0.18 MICROgram(s)/kG/Min IV Continuous <Continuous>  artificial  tears Solution 1 Drop(s) Both EYES every 12 hours  atovaquone  Suspension 1500 milliGRAM(s) Oral daily  chlorhexidine 0.12% Liquid 15 milliLiter(s) Oral Mucosa every 12 hours  chlorhexidine 2% Cloths 1 Application(s) Topical <User Schedule>  dexMEDEtomidine Infusion 0.2 MICROgram(s)/kG/Hr IV Continuous <Continuous>  dextrose 5%. 1000 milliLiter(s) IV Continuous <Continuous>  dextrose 50% Injectable 25 Gram(s) IV Push once  dextrose 50% Injectable 25 Gram(s) IV Push once  dextrose 50% Injectable 12.5 Gram(s) IV Push once  filgrastim-sndz (ZARXIO) Injectable 480 MICROGram(s) SubCutaneous daily  folic acid 1 milliGRAM(s) Oral daily  glucagon  Injectable 1 milliGRAM(s) IntraMuscular once  insulin lispro (ADMELOG) corrective regimen sliding scale   SubCutaneous every 6 hours  insulin NPH human recombinant 11 Unit(s) SubCutaneous every 6 hours  methylPREDNISolone sodium succinate Injectable 80 milliGRAM(s) IV Push daily  metoprolol tartrate 12.5 milliGRAM(s) Oral two times a day  multivitamin/minerals/iron Oral Solution (CENTRUM) 15 milliLiter(s) Oral daily  pantoprazole  Injectable 40 milliGRAM(s) IV Push daily  phenylephrine    Infusion 0.5 MICROgram(s)/kG/Min IV Continuous <Continuous>  polyethylene glycol 3350 17 Gram(s) Oral <User Schedule>  senna Syrup 10 milliLiter(s) Oral two times a day      PRN Meds:  dextrose Oral Gel 15 Gram(s) Oral once PRN  diphenhydrAMINE Injectable 50 milliGRAM(s) IV Push once PRN  meperidine     Injectable 25 milliGRAM(s) IV Push once PRN      LABS:                        11.6   7.96  )-----------( 43       ( 21 Sep 2023 04:15 )             35.9     Hgb Trend: 11.6<--, 11.7<--, 11.6<--, 11.6<--, 11.2<--  09-21    139  |  101  |  40<H>  ----------------------------<  158<H>  4.5   |  30  |  0.35<L>    Ca    9.1      21 Sep 2023 04:15  Phos  2.4     09-21  Mg     2.30     09-21    TPro  5.0<L>  /  Alb  3.2<L>  /  TBili  14.3<H>  /  DBili  x   /  AST  119<H>  /  ALT  197<H>  /  AlkPhos  295<H>  09-21    Creatinine Trend: 0.35<--, 0.35<--, 0.33<--, 0.33<--, 0.37<--, 0.37<--  PT/INR - ( 21 Sep 2023 04:15 )   PT: 16.6 sec;   INR: 1.49 ratio         PTT - ( 21 Sep 2023 04:15 )  PTT:56.7 sec  Urinalysis Basic - ( 21 Sep 2023 04:15 )    Color: x / Appearance: x / SG: x / pH: x  Gluc: 158 mg/dL / Ketone: x  / Bili: x / Urobili: x   Blood: x / Protein: x / Nitrite: x   Leuk Esterase: x / RBC: x / WBC x   Sq Epi: x / Non Sq Epi: x / Bacteria: x      Arterial Blood Gas:  09-21 @ 04:15  7.50/42/133/33/99.2/8.7  ABG lactate: --  Arterial Blood Gas:  09-20 @ 23:05  7.49/45/93/34/96.9/9.7  ABG lactate: --  Arterial Blood Gas:  09-20 @ 18:30  7.53/39/91/33/99.0/9.2  ABG lactate: --  Arterial Blood Gas:  09-20 @ 16:15  7.53/42/84/35/97.3/11.3  ABG lactate: --  Arterial Blood Gas:  09-20 @ 10:40  7.48/42/96/31/98.4/7.1  ABG lactate: --  Arterial Blood Gas:  09-20 @ 09:00  7.46/46/99/33/97.5/7.8  ABG lactate: --  Arterial Blood Gas:  09-20 @ 04:46  7.46/49/158/35/99.5/9.6  ABG lactate: --  Arterial Blood Gas:  09-19 @ 23:25  7.50/43/137/34/99.2/9.3  ABG lactate: --  Arterial Blood Gas:  09-19 @ 16:40  7.48/44/113/33/98.8/8.3  ABG lactate: --  Arterial Blood Gas:  09-19 @ 10:25  7.50/47/78/37/96.5/11.9  ABG lactate: --  Arterial Blood Gas:  09-19 @ 08:14  7.54/44/123/38/99.1/13.6  ABG lactate: --        MICROBIOLOGY:     Culture - Bronchial (collected 20 Sep 2023 16:39)  Source: .Bronchial Bronchial Lavage  Gram Stain (20 Sep 2023 23:41):    No polymorphonuclear cells seen per low power field    No squamous epithelial cells per low power field    Rare Yeast per oil power field    Culture - Sputum (collected 19 Sep 2023 10:40)  Source: ET Tube ET Tube  Gram Stain (19 Sep 2023 23:20):    Few polymorphonuclear leukocytes per low power field    Few Squamous epithelial cells per low power field    Moderate Yeast per oil power field    Moderate Gram Positive Cocci in Clusters per oil power field  Preliminary Report (20 Sep 2023 16:46):    Normal Respiratory Noreen present      RADIOLOGY:  [ ] Reviewed and interpreted by me

## 2023-09-21 NOTE — PROGRESS NOTE ADULT - ASSESSMENT
62 year old with progressive dyspnea and hypoxia that has progressed over months.  She has some associated rash and arthralgia.    Suspected Mixed connective tissue disorder/SLE    She had progressive hypoxic respiratory failure  She was intubated  Now on VV ECMO.    ANC recovered, I agree with stopping meropenem    I would continue mepron for prophylaxis      Call ID service if we can be of assistance  I will sign off    Jonathan Randhawa MD  Please contact via TEAM between 8 am and 6 pm    In the evening or on weekends, can contact call service at [phone]

## 2023-09-21 NOTE — CHART NOTE - NSCHARTNOTEFT_GEN_A_CORE
63 y/o female decannulated earlier today at 11am at bedside by myself with help of Perfusion team.  Dr. Schultz aware.  ABG with VV ECMO weaned at 10:15am 7/49/41/86/31/97.5%  Argatroban held.  Patient sedated w/ precedex drip.  Given IV dilaudid and local lidocaine.  Right neck and Right groin sites C/D/I after decannulating and holding pressure x20min.  Patient comfortable, no complications.  Continues on phenylephrine drip and on ventilator.

## 2023-09-21 NOTE — PROGRESS NOTE ADULT - ASSESSMENT
61 yo F w/ Afib initially presented to urgent care for SOB where she had an XR done concerning for b/l lower infiltrates, sent to [facility] ED and  admitted to [facility] on 8/26 after being found to be hypoxemic, reported having  debilitating b/l hand numbness/tingling and some muscles weakness several months. Found to be hypoxic and have interstitial lung disease appearance on CT, admitted 8/26 for AHRF, further ILD workup and management,  started on prednisone on admission with gradually improving O2 requirement and has been stable on 2-3LNC since 9/3, underwent bronchoscopy with TBBX on 8/30 which showed Non-Specific Intersitial Disease (NSIP)/OP. Labs notable for positive ARIANA 1:1280, RNP > 8, Alejandro > 8. Patient continued on steroids and recieved cellcept, which was discontinued 2/2 Afib-RVR. Interval CTA chest on 9/11 also negative PE did show possible lingula pneumonia.  Started on empiric vancomycin and zosyn 9/12, course was then c/b episode of SVT to 170s w/ rapidly progressive hypoxemic respiratory failure, placed NRB offered HFNC/BiPAP but refused, leading to worsening hypoxemic respiratory failure requiring intubation  9/13. Despite best practices, patient remained hypoxemic and patient was cannulated for VV-ECMO on 9/13 and transferred to [facility] for further management.     NEUROLOGY  Home meds: gabapentin 100g TID  AA&Ox3 at baseline   - following commands off sedation  - currently on precedex for comfort remains off dilaudid   - CTH 9/14 no acute findings  - Palliative following  - Social Work consult  - PT/OT following    PULMONARY  Admitted to [facility] on 8/26 after p/w SOB, found to be hypoxemic, required 2-4L NC/bibap, initially responded well to IV steroids. 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, occupation in construction  - pt reportedly had been seen by a pulmonologist and rheumatology ([place]), and was ruled out for lupus and PE  - CT findings at OSH consistent with ILD   - Current ventilator settings: PS  RR 12, PS 15, PEEP 12 Fi02 40% pulling TV ~400-500  - Emergency settings: VC 28/400/12/100%  - Bronchoscopy showed mild thick yellow secretions in trachea, diffuse moderate thin green/brown secretions throughout, serial BAL x3 performed of RML with progressively bloody return 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 RIJ 19F   Flow: 3.7		P1: 164  	Delta P: 27  RPM: 2850		P2: 137		SVO2: 79  Sweep: 0.5 L/min:              FIO2:   21%    - 9/14 right groin cannula pulled-back ~3-4cm to position tip of cannula just inferior to hepatic vein   - clinically perfusing. Lactate: 1.5  - ECMO sweep sighing q1hr  - Adjust circuit flow as tolerated with DO2:VO2 goal >2  - decreased pump FdO2 to 21% and vent FiO2 to 40%  - Monitor for hemolysis, chatter, evidence of recirculation       CARDIOVASCULAR  Hx of Afib w at OSH, started on Amiodarone gtt now in shock state requiring requiring pressors likely septic with component of vaspoplegia i/s/o sedation, possible contribution of cardiogenic from RV dysfunction   - No PE seen on invasive pulmonary angiography at time of ECMO cannulation or in recent imaging  - NO2 weaned off  (9/15) RV function remains unchanged  - s/p milrinone, off since (9/13)  - converted to sinus (9/14) discontinued amio gtt iso bradycardia   - back to AF with RVR on 9/19 when sedation weaned off. started on metoprolol for rate control  - overnight 9/19 had some hypotension now back on marianne but coming off (currently 0.2)  - RITCHIE 9/14 : No MEREDITH thrombus noted, negative for PFO. LVOT diameter of 2 cm  - TTE 9/12 with EF 65-70% with normal LV and RV  - TTE 9/14 with  EF 18%. Severe global LV systolic dysfunction. RV enlargement with decreased RV systolic function, however RITCHIE and recent POCUS shows normal LV systolic function, improved RV systolic function   - TTE 9/19 with recovered EF to 67% but still with RV enlargement and systolic dysfunction    GASTROINTESTINAL  Transaminase elevation likely 2/2 combination of shock liver, malposition of ECMO cannula and liver dysfunction  - ALK normal 106, AST/ALT downtrending but remain elevated 240/760, T.bili continues to rise 9.8  - Acute hepatitis panel negative  - RUQ US 9/15 shows fatty infiltration of liver, negative for hepatobiliary pathology, repeat RUQUS negative as well  - ECMO canula repositioned appropriately 9/14  - Hepatology consulted - likely shock liver with expected delay in improvement in bilirubin  - Triglycerides elevated 836, on insulin gtt for hyperglycemia, downtrending 406 > 271 >221 >173 > 154  - Episode of vomiting 9/16, tube feeds held and given reglan x48 hours, restarted TF 9/17, tolerating  - BM noted 9/17 and 9/18  - continue bowel regimen senna, miralax and movantik to avoid opioid induced constipation  - CT abdomen 9/15 w/o bowel obstruction, noted with colonic diverticulosis mostly in sigmoid, w/o evidence of diverticulitis  - continue PPI iso steroids   - nutrition following        RENAL  sCr baseline 0.78  - Creatinine wnl, 0.37 today   - s/p diuresis with lasix, last administered 9/15, IVC 1.8cm  - now with hemoconcentrator to assist with fluid removal was removing -50cc/h overnight   - metabolic alkalosis, ?contraction, goal to keep net even given hypernatremia and elevated bicarb  - on primafit, making urine  - monitor, Replete to K>4, Phos>3, Mg>2, iCa>1  - hypernatremia improving, on 1/2NS on hemoconcentrator and free water by NGT      INFECTIOUS DISEASE  Leukopenia likely transient? vs drug induced vs infection vs malignancy?  - WBC 1.03 today (improving), remains afebrile however temperature has been regulated via ECMO circuit, will turn off and monitor for fever  - Bactrim DS discontinued iso leukopenia, continue Mepron for PJP prophylaxis instead  - heme following- thrombocytopenia noted as well as leukopenia, likely drug induced? filgrastim started 9/17  - s/p IV Ceftriaxone 5 days? at [facility] out of an abundance of precaution to cover BAL specimen  - s/p zosyn at OSH (9/12) for lingula PNA  - vancomycin (9/12-9/15 ) d/c'd negative mrsa PCR, and azithro (9/14-9/15) d/c'd neg Urine legionella  - c/w empiric donald (9/13- ) until ANC >1.5 per ID  - vanco restarted for ppx on 9/18 d/t leukopenia but stopped on 9/20  - 9/13 and 9/16 urine, sputum and blood cx ngtd  - positive COVID-19 antigen in late August  - f/u BAL, cultures, cytopathology, IL2 receptor  - ID consult for leukopenia - f/u recs      ENDOCRINE  # Hyperglycemia i/s/o steroids  Admission A1c 6.1  - had required insulin gtt but now back on NPH 11 with ISS  - elevated triglycerides 835, has hx of fatty liver, had beenon propofol gtt. TG downtrending since initiating insulin gtt for hyperglycemia, now 114, continue to monitor daily  - TSH low at 0.13/Free T4 slightly low 0.7, will repeat in a few days      HEME  # ECMO anticoagulation  - continue argatroban gtt goal aPTT 50-70  - INR elevated likely iso argatroban and liver dysfxn  - platelets continue to downtrend, refractory after transfusion, 47  -> 51 -> 46 now 25  - s/p Vit. K (9/14) and FFP x1 (9/15), platelets with goal >50k  - fibrinogen 250, monitor daily  - Hgb remains stable 11.3  - Transfuse for Hgb <7, Plt<10  - Heme consult      #Leukopenia  #Thrombocytopenia  - Heme consult placed for thrombocytopenia, noted to also be leukopenic  - unlikely HLH/MAS since many abnormalities can be explained by severe hypoxemia/hypotension during initial decompensation prior to ecmo cannulation but some features that are consistent and with rheumatic disease it is a possibility to f/u hematology regarding utility of further lab/pathologic testing  - peripheral blood smear reviewed: large platelets noted, no platelet clumps, no blast cells noted, neutrophils noted w/ normal number of lobes, nucleated RBC noted  - acute hepatitis panel negative  - give filgrastim 480 daily until ANC >1500 in the setting of possible infxn       #R/O DVT  - LE duplex negative for DVT but will repeat  - f/u duplex- pending    MSK  Onset of debilitating b/l hand cramps and some muscles weakness x several months, unclear etiology, radiculopathy? vs myositis?   - MRI outpatient reportedly showed cervical spine issues, started on Prednisone shortly before admission at OSH  - myomarker panel +ivon only for RNP  - seen by neurologist at [facility]   - symptoms improved with cellcept (9/8-9/11) but discontinued given Afib RVR   - f/u with Dr. Nik Marie or Dante Urbano for EMG upon discharge (osh?)      RHEUM  - started on Solumedrol 60mg q6h from 9/1 to 9/6 then weaned over time to then Medrol 32 mg 9/9 to 9/12 at [facility]  - s/p Cellcept 9/8 to 9/11 but stopped due to Afib- RVR/tachycardia   - CT findings, Improved/decreased extent of bilateral ground glass opacities and reticular markings compared to the previous study. Findings are nonspecific but differential etiologies include interstitial pneumonia, drug toxicity, nonspecific connective tissue disorders.  - OSH labs show strongly positive ARIANA, RNP, and anti smith antibodies with low C4 and normal C3. Patient with negative SSa and SSb, negative DsDNa, myomarker panel negative (except RNP)  - Rheum following  - s/p 1g solumedrol - first dose on 9/13, second dose 9/14, and third and last dose today 9/15, and then solumedrol (1mg/kg) 125mg daily, plan to change to 80mg daily today as per rheum  - s/p rituximab 9/16/23  - C/w plasmapheresis for therapeutic option to rapidly remove autoantibodies and inflammatory factors from plasma d/t severe DAH. First session  (9/15), next session planned for today 9/20 (will need to coordinate time with perfusion and blood bank)    SKIN  Reportedly had single episode of painful rash on her shins, ears and neck and chest which resolved with a steroid cream from a dermatologist prior to admission  - no evidence of rash currently      PROPHYLAXIS  # DVT: argatroban  # GI: TF      LINES  -Ecmo Cannulas  -[facility]J TLC- 9/13 (placed at OSH)  -Left Ax Doyle - 9/13 (placed at OSH)  -RA 16g PIV x2- 9/15      GOC  -Palliative Following   61 yo F w/ Afib initially presented to urgent care for SOB where she had an XR done concerning for b/l lower infiltrates, sent to [facility] ED and admitted to  on 8/26 after being found to be hypoxemic, reported having  debilitating b/l hand numbness/tingling and some muscles weakness several months. She was found to have interstitial lung disease appearance on CT, with the plan for further ILD workup and management, started on prednisone on admission with gradually improving O2 requirements and stable on 2-3LNC. She underwent bronchoscopy with TBBX on 8/30 which showed Non-Specific Intersitial Disease (NSIP)/OP. Labs notable for positive ARIANA 1:1280, RNP > 8, Alejandro > 8. Patient continued on steroids and received cellcept, which was discontinued 2/2 Afib-RVR. Interval CTA chest on 9/11 also negative PE did show possible lingula pneumonia. Started on empiric vancomycin and zosyn 9/12, course was then c/b episode of SVT to 170s w/ rapidly progressive hypoxemic respiratory failure, placed NRB offered HFNC/BiPAP but refused, leading to worsening hypoxemic respiratory failure requiring intubation on 9/13. Despite best practices, patient remained hypoxemic and patient was cannulated for VV-ECMO on 9/13 and transferred to [facility] for further management. 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 Showed significant lung improvement and was decannulated 9/21.    NEUROLOGY  Home meds: gabapentin 100g TID  AA&Ox3 at baseline   - following commands on precedex   - CTH 9/14 no acute findings  - Palliative following  - Social Work consult  - PT/OT following    PULMONARY  Admitted to [facility] on 8/26 after p/w SOB, found to be hypoxemic, required 2-4L NC/bibap, initially responded well to IV steroids. Interval CTA chest on 9/11 also showed improved in reticular findings and diffuse GGO, then had episode of SVT to 170s (9/12) with rapidly progressive hypoxemic respiratory failure leading to intubation 9/13 required very high PEEP (18) and 100% FiO2 and still had low saturations,  VV ECMO cannula placement 9/13 and was then transferred to [facility] 9/13 for Acute hypoxemic respiratory failure likely DAH/ILD in setting of MCTD. 2/2 bacterial PNA vs atypical PNA vs aspiration vs AEILD continued to show signficant improvement from a lung perspective and was decannulated 9/21.    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, occupation in construction  - pt reportedly had been seen by a pulmonologist and rheumatology ([place]), and was ruled out for lupus and PE  - CT findings at OSH consistent with ILD   - Current ventilator settings: PS  RR 12, PS 15, PEEP 12 Fi02 40% pulling TV ~350-400's PS trial as tolerated   - Bronchoscopy 9/13 showed mild thick yellow secretions in trachea, diffuse moderate thin green/brown secretions throughout, serial BAL x3 performed of RML with progressively bloody return indicating DAH likely 2/2 MCTD  - rheum following for DAH  - continue with duonebs,  trialed IPV but vomited shortly after initiation, now discontinued,  - c/w empiric abx   -s/p decannulation on 9/21 with plan for suture removal ~7-10 days       CARDIOVASCULAR  Hx of Afib w at OSH, started on Amiodarone gtt now in shock state requiring requiring pressors likely septic with component of vaspoplegia i/s/o sedation, possible contribution of cardiogenic from RV dysfunction   - No PE seen on invasive pulmonary angiography at time of ECMO cannulation or in recent imaging  - NO2 weaned off  (9/15) RV function remains unchanged  - s/p milrinone, off since (9/13)  - converted to sinus (9/14) discontinued amio gtt iso bradycardia   - back to AF with RVR on 9/19 when sedation weaned off c/w metoprolol for rate control and increase as needed   - overnight 9/19 had some hypotension now back on marianne but coming off (currently 0.2)  - RITCHIE 9/14 : No MEREDITH thrombus noted, negative for PFO. LVOT diameter of 2 cm  - TTE 9/12 with EF 65-70% with normal LV and RV  - TTE 9/14 with  EF 18%. Severe global LV systolic dysfunction. RV enlargement with decreased RV systolic function, however RITCHIE and recent POCUS shows normal LV systolic function, improved RV systolic function   - TTE 9/19 with recovered EF to 67% but still with RV enlargement and systolic dysfunction    GASTROINTESTINAL  Transaminase elevation likely 2/2 combination of shock liver, malposition of ECMO cannula and liver dysfunction  - ALK normal 106, AST/ALT downtrending but remain elevated 240/760, T.bili continues to rise  - Acute hepatitis panel negative  - RUQ US 9/15 shows fatty infiltration of liver, negative for hepatobiliary pathology, repeat RUQUS negative as well  - Hepatology consulted - likely shock liver with expected delay in improvement in bilirubin  - Triglycerides elevated 836, on insulin gtt for hyperglycemia, downtrending 406 > 271 >221 >173 > 154  - Episode of vomiting 9/16, tube feeds held and given reglan x48 hours, restarted TF 9/17, tolerating  - BM noted 9/17 and 9/18  - continue bowel regimen senna, miralax and movantik to avoid opioid induced constipation  - CT abdomen 9/15 w/o bowel obstruction, noted with colonic diverticulosis mostly in sigmoid, w/o evidence of diverticulitis  - continue PPI iso steroids   - nutrition following      RENAL  sCr baseline 0.78  - Creatinine wnl  - s/p diuresis with lasix, last administered 9/15 was on hemoconcentrator for fluid removal when on circuit, will assess daily if lasix is needed   - metabolic alkalosis, ?contraction, goal to keep net even given hypernatremia and elevated bicarb  -good UO with primafit   - hypernatremia improved will stop free H2O today       INFECTIOUS DISEASE  Leukopenia likely transient? vs drug induced vs infection vs malignancy?  - WBC significantly improving now 9.16  - Bactrim DS discontinued iso leukopenia, continue Mepron for PJP prophylaxis instead  - heme following- thrombocytopenia noted as well as leukopenia, likely drug induced? filgrastim started 9/17  - s/p IV Ceftriaxone 5 days? at [facility] out of an abundance of precaution to cover BAL specimen  - s/p zosyn at OSH (9/12) for lingula PNA  - vancomycin (9/12-9/15 ) d/c'd negative mrsa PCR, and azithro (9/14-9/15) d/c'd neg Urine legionella  -s/p empiric donald (9/13-9/20) now that ANC >1.5  - vanco restarted for ppx on 9/18 d/t leukopenia but stopped on 9/20  - 9/13 and 9/16 urine, sputum and blood cx ngtd  - positive COVID-19 antigen in late August  - f/u BAL, cultures, cytopathology, IL2 receptor  - ID consult for leukopenia - f/u recs      ENDOCRINE  # Hyperglycemia i/s/o steroids  Admission A1c 6.1  - had required insulin gtt but now back on NPH 11 with sliding scale   - elevated triglycerides 835, has hx of fatty liver, had beenon propofol gtt. TG downtrending since initiating insulin gtt for hyperglycemia,  - TSH low at 0.13/Free T4 slightly low 0.7, will repeat in a few days      HEME  # ECMO anticoagulation  - continue argatroban gtt goal aPTT 50-70 until negative duplex x2   -pending first [facility] duplex   - INR elevated likely iso argatroban and liver dysfxn  - s/p Vit. K (9/14) and FFP x1 (9/15)  - fibrinogen 250, monitor daily  - Hgb remains stable   - Heme consult      #Leukopenia  #Thrombocytopenia  - Heme consult placed for thrombocytopenia, noted to also be leukopenic  - unlikely HLH/MAS since many abnormalities can be explained by severe hypoxemia/hypotension during initial decompensation prior to ecmo cannulation but some features that are consistent and with rheumatic disease it is a possibility to f/u hematology regarding utility of further lab/pathologic testing  - peripheral blood smear reviewed: large platelets noted, no platelet clumps, no blast cells noted, neutrophils noted w/ normal number of lobes, nucleated RBC noted  - acute hepatitis panel negative  - give filgrastim 480 daily until ANC >1500 in the setting of possible infxn will stop today   -Thrombocytopenia refractory after intermittent transfusions will monitor now off circuit     #R/O DVT  - LE duplex negative for DVT   - f/u duplex- pending post decannulation     MSK  Onset of debilitating b/l hand cramps and some muscles weakness x several months, unclear etiology, radiculopathy? vs myositis?   - MRI outpatient reportedly showed cervical spine issues, started on Prednisone shortly before admission at OSH  - myomarker panel +ivon only for RNP  - seen by neurologist at [facility]   - symptoms improved with cellcept (9/8-9/11) but discontinued given Afib RVR   - f/u with Dr. Nik Marie or Dante Urbano for EMG upon discharge (osh?)      RHEUM  - started on Solumedrol 60mg q6h from 9/1 to 9/6 then weaned over time to then Medrol 32 mg 9/9 to 9/12 at [facility]  - s/p Cellcept 9/8 to 9/11 but stopped due to Afib- RVR/tachycardia   - CT findings, Improved/decreased extent of bilateral ground glass opacities and reticular markings compared to the previous study. Findings are nonspecific but differential etiologies include interstitial pneumonia, drug toxicity, nonspecific connective tissue disorders.  - OSH labs show strongly positive ARIANA, RNP, and anti smith antibodies with low C4 and normal C3. Patient with negative SSa and SSb, negative DsDNa, myomarker panel negative (except RNP)  - Rheum following  - s/p 1g solumedrol - first dose on 9/13, second dose 9/14, and third and last dose today 9/15, and then solumedrol (1mg/kg) 125mg daily, plan to change to 80mg daily today as per rheum  - s/p rituximab 9/16/23  -s/p  plasmapheresis x3 for therapeutic option to rapidly remove autoantibodies and inflammatory factors from plasma d/t severe DAH    SKIN  Reportedly had single episode of painful rash on her shins, ears and neck and chest which resolved with a steroid cream from a dermatologist prior to admission  - no evidence of rash currently  -right groin with breakdown s/p cannula placement       PROPHYLAXIS  # DVT: argatroban  # GI: TF      LINES  -Ecmo Cannulas 9/13-9/21  -[facility] TLC- 9/13  -Left Ax Traci - 9/13 (placed at OSH)  -RA 16g PIV x2- 9/15      GO  -Palliative Following  -full code  -partner Kiara at bedside daily and updated on care

## 2023-09-22 LAB
ALBUMIN SERPL ELPH-MCNC: 3 G/DL — LOW (ref 3.3–5)
ALBUMIN SERPL ELPH-MCNC: 3.3 G/DL — SIGNIFICANT CHANGE UP (ref 3.3–5)
ALP SERPL-CCNC: 490 U/L — HIGH (ref 40–120)
ALP SERPL-CCNC: 579 U/L — HIGH (ref 40–120)
ALT FLD-CCNC: 225 U/L — HIGH (ref 4–33)
ALT FLD-CCNC: 276 U/L — HIGH (ref 4–33)
ANION GAP SERPL CALC-SCNC: 13 MMOL/L — SIGNIFICANT CHANGE UP (ref 7–14)
ANION GAP SERPL CALC-SCNC: 6 MMOL/L — LOW (ref 7–14)
ANISOCYTOSIS BLD QL: SLIGHT — SIGNIFICANT CHANGE UP
APTT BLD: 47.7 SEC — HIGH (ref 24.5–35.6)
APTT BLD: 47.8 SEC — HIGH (ref 24.5–35.6)
APTT BLD: 55.6 SEC — HIGH (ref 24.5–35.6)
APTT BLD: 57 SEC — HIGH (ref 24.5–35.6)
AST SERPL-CCNC: 136 U/L — HIGH (ref 4–32)
AST SERPL-CCNC: 156 U/L — HIGH (ref 4–32)
BASOPHILS # BLD AUTO: 0 K/UL — SIGNIFICANT CHANGE UP (ref 0–0.2)
BASOPHILS # BLD AUTO: 0.02 K/UL — SIGNIFICANT CHANGE UP (ref 0–0.2)
BASOPHILS NFR BLD AUTO: 0 % — SIGNIFICANT CHANGE UP (ref 0–2)
BASOPHILS NFR BLD AUTO: 0.1 % — SIGNIFICANT CHANGE UP (ref 0–2)
BILIRUB DIRECT SERPL-MCNC: 14.2 MG/DL — HIGH (ref 0–0.3)
BILIRUB SERPL-MCNC: 16.3 MG/DL — HIGH (ref 0.2–1.2)
BILIRUB SERPL-MCNC: 19.3 MG/DL — HIGH (ref 0.2–1.2)
BLOOD GAS ARTERIAL - LYTES,HGB,ICA,LACT RESULT: SIGNIFICANT CHANGE UP
BUN SERPL-MCNC: 33 MG/DL — HIGH (ref 7–23)
BUN SERPL-MCNC: 37 MG/DL — HIGH (ref 7–23)
CALCIUM SERPL-MCNC: 9.1 MG/DL — SIGNIFICANT CHANGE UP (ref 8.4–10.5)
CALCIUM SERPL-MCNC: 9.3 MG/DL — SIGNIFICANT CHANGE UP (ref 8.4–10.5)
CHLORIDE SERPL-SCNC: 101 MMOL/L — SIGNIFICANT CHANGE UP (ref 98–107)
CHLORIDE SERPL-SCNC: 94 MMOL/L — LOW (ref 98–107)
CK SERPL-CCNC: 31 U/L — SIGNIFICANT CHANGE UP (ref 25–170)
CO2 SERPL-SCNC: 25 MMOL/L — SIGNIFICANT CHANGE UP (ref 22–31)
CO2 SERPL-SCNC: 30 MMOL/L — SIGNIFICANT CHANGE UP (ref 22–31)
CREAT SERPL-MCNC: 0.41 MG/DL — LOW (ref 0.5–1.3)
CREAT SERPL-MCNC: 0.41 MG/DL — LOW (ref 0.5–1.3)
CULTURE RESULTS: SIGNIFICANT CHANGE UP
EGFR: 110 ML/MIN/1.73M2 — SIGNIFICANT CHANGE UP
EGFR: 110 ML/MIN/1.73M2 — SIGNIFICANT CHANGE UP
EOSINOPHIL # BLD AUTO: 0 K/UL — SIGNIFICANT CHANGE UP (ref 0–0.5)
EOSINOPHIL # BLD AUTO: 0 K/UL — SIGNIFICANT CHANGE UP (ref 0–0.5)
EOSINOPHIL NFR BLD AUTO: 0 % — SIGNIFICANT CHANGE UP (ref 0–6)
EOSINOPHIL NFR BLD AUTO: 0 % — SIGNIFICANT CHANGE UP (ref 0–6)
GIANT PLATELETS BLD QL SMEAR: PRESENT — SIGNIFICANT CHANGE UP
GLUCOSE BLDC GLUCOMTR-MCNC: 106 MG/DL — HIGH (ref 70–99)
GLUCOSE BLDC GLUCOMTR-MCNC: 193 MG/DL — HIGH (ref 70–99)
GLUCOSE BLDC GLUCOMTR-MCNC: 246 MG/DL — HIGH (ref 70–99)
GLUCOSE SERPL-MCNC: 142 MG/DL — HIGH (ref 70–99)
GLUCOSE SERPL-MCNC: 220 MG/DL — HIGH (ref 70–99)
HCT VFR BLD CALC: 34.3 % — LOW (ref 34.5–45)
HCT VFR BLD CALC: 39.5 % — SIGNIFICANT CHANGE UP (ref 34.5–45)
HGB BLD-MCNC: 11.1 G/DL — LOW (ref 11.5–15.5)
HGB BLD-MCNC: 12.9 G/DL — SIGNIFICANT CHANGE UP (ref 11.5–15.5)
IANC: 15.7 K/UL — HIGH (ref 1.8–7.4)
IANC: 19.75 K/UL — HIGH (ref 1.8–7.4)
IMM GRANULOCYTES NFR BLD AUTO: 3.4 % — HIGH (ref 0–0.9)
INR BLD: 1.54 RATIO — HIGH (ref 0.85–1.18)
INR BLD: 1.81 RATIO — HIGH (ref 0.85–1.18)
LDH SERPL L TO P-CCNC: 520 U/L — HIGH (ref 135–225)
LYMPHOCYTES # BLD AUTO: 0.33 K/UL — LOW (ref 1–3.3)
LYMPHOCYTES # BLD AUTO: 0.45 K/UL — LOW (ref 1–3.3)
LYMPHOCYTES # BLD AUTO: 1.8 % — LOW (ref 13–44)
LYMPHOCYTES # BLD AUTO: 2 % — LOW (ref 13–44)
MACROCYTES BLD QL: SLIGHT — SIGNIFICANT CHANGE UP
MAGNESIUM SERPL-MCNC: 2.3 MG/DL — SIGNIFICANT CHANGE UP (ref 1.6–2.6)
MAGNESIUM SERPL-MCNC: 2.3 MG/DL — SIGNIFICANT CHANGE UP (ref 1.6–2.6)
MANUAL SMEAR VERIFICATION: SIGNIFICANT CHANGE UP
MCHC RBC-ENTMCNC: 30.5 PG — SIGNIFICANT CHANGE UP (ref 27–34)
MCHC RBC-ENTMCNC: 30.7 PG — SIGNIFICANT CHANGE UP (ref 27–34)
MCHC RBC-ENTMCNC: 32.4 GM/DL — SIGNIFICANT CHANGE UP (ref 32–36)
MCHC RBC-ENTMCNC: 32.7 GM/DL — SIGNIFICANT CHANGE UP (ref 32–36)
MCV RBC AUTO: 93.4 FL — SIGNIFICANT CHANGE UP (ref 80–100)
MCV RBC AUTO: 94.8 FL — SIGNIFICANT CHANGE UP (ref 80–100)
METAMYELOCYTES # FLD: 1.8 % — HIGH (ref 0–1)
MONOCYTES # BLD AUTO: 1.14 K/UL — HIGH (ref 0–0.9)
MONOCYTES # BLD AUTO: 1.24 K/UL — HIGH (ref 0–0.9)
MONOCYTES NFR BLD AUTO: 5.6 % — SIGNIFICANT CHANGE UP (ref 2–14)
MONOCYTES NFR BLD AUTO: 6.2 % — SIGNIFICANT CHANGE UP (ref 2–14)
MYELOCYTES NFR BLD: 0.9 % — HIGH (ref 0–0)
NEUTROPHILS # BLD AUTO: 16.39 K/UL — HIGH (ref 1.8–7.4)
NEUTROPHILS # BLD AUTO: 19.75 K/UL — HIGH (ref 1.8–7.4)
NEUTROPHILS NFR BLD AUTO: 80.4 % — HIGH (ref 43–77)
NEUTROPHILS NFR BLD AUTO: 88.9 % — HIGH (ref 43–77)
NEUTS BAND # BLD: 8.9 % — HIGH (ref 0–6)
NRBC # BLD: 0 /100 WBCS — SIGNIFICANT CHANGE UP (ref 0–0)
NRBC # BLD: 1 /100 — HIGH (ref 0–0)
NRBC # FLD: 0.2 K/UL — HIGH (ref 0–0)
PHOSPHATE SERPL-MCNC: 2.6 MG/DL — SIGNIFICANT CHANGE UP (ref 2.5–4.5)
PHOSPHATE SERPL-MCNC: 3.3 MG/DL — SIGNIFICANT CHANGE UP (ref 2.5–4.5)
PLAT MORPH BLD: NORMAL — SIGNIFICANT CHANGE UP
PLATELET # BLD AUTO: 75 K/UL — LOW (ref 150–400)
PLATELET # BLD AUTO: 82 K/UL — LOW (ref 150–400)
PLATELET COUNT - ESTIMATE: ABNORMAL
POLYCHROMASIA BLD QL SMEAR: SLIGHT — SIGNIFICANT CHANGE UP
POTASSIUM SERPL-MCNC: 4.6 MMOL/L — SIGNIFICANT CHANGE UP (ref 3.5–5.3)
POTASSIUM SERPL-MCNC: 4.8 MMOL/L — SIGNIFICANT CHANGE UP (ref 3.5–5.3)
POTASSIUM SERPL-SCNC: 4.6 MMOL/L — SIGNIFICANT CHANGE UP (ref 3.5–5.3)
POTASSIUM SERPL-SCNC: 4.8 MMOL/L — SIGNIFICANT CHANGE UP (ref 3.5–5.3)
PROT SERPL-MCNC: 4.9 G/DL — LOW (ref 6–8.3)
PROT SERPL-MCNC: 5.5 G/DL — LOW (ref 6–8.3)
PROTHROM AB SERPL-ACNC: 17.2 SEC — HIGH (ref 9.5–13)
PROTHROM AB SERPL-ACNC: 19.9 SEC — HIGH (ref 9.5–13)
RBC # BLD: 3.62 M/UL — LOW (ref 3.8–5.2)
RBC # BLD: 4.23 M/UL — SIGNIFICANT CHANGE UP (ref 3.8–5.2)
RBC # FLD: 17.9 % — HIGH (ref 10.3–14.5)
RBC # FLD: 18.7 % — HIGH (ref 10.3–14.5)
RBC BLD AUTO: ABNORMAL
SMUDGE CELLS # BLD: PRESENT — SIGNIFICANT CHANGE UP
SODIUM SERPL-SCNC: 132 MMOL/L — LOW (ref 135–145)
SODIUM SERPL-SCNC: 137 MMOL/L — SIGNIFICANT CHANGE UP (ref 135–145)
SPECIMEN SOURCE: SIGNIFICANT CHANGE UP
TRIGL SERPL-MCNC: 184 MG/DL — HIGH
WBC # BLD: 18.35 K/UL — HIGH (ref 3.8–10.5)
WBC # BLD: 22.21 K/UL — HIGH (ref 3.8–10.5)
WBC # FLD AUTO: 18.35 K/UL — HIGH (ref 3.8–10.5)
WBC # FLD AUTO: 22.21 K/UL — HIGH (ref 3.8–10.5)

## 2023-09-22 PROCEDURE — 76705 ECHO EXAM OF ABDOMEN: CPT | Mod: 26

## 2023-09-22 PROCEDURE — 99232 SBSQ HOSP IP/OBS MODERATE 35: CPT | Mod: GC

## 2023-09-22 PROCEDURE — 99291 CRITICAL CARE FIRST HOUR: CPT

## 2023-09-22 RX ORDER — ACETAMINOPHEN 500 MG
1000 TABLET ORAL ONCE
Refills: 0 | Status: COMPLETED | OUTPATIENT
Start: 2023-09-22 | End: 2023-09-22

## 2023-09-22 RX ORDER — BUMETANIDE 0.25 MG/ML
1 INJECTION INTRAMUSCULAR; INTRAVENOUS ONCE
Refills: 0 | Status: COMPLETED | OUTPATIENT
Start: 2023-09-22 | End: 2023-09-22

## 2023-09-22 RX ORDER — HYDRALAZINE HCL 50 MG
10 TABLET ORAL ONCE
Refills: 0 | Status: COMPLETED | OUTPATIENT
Start: 2023-09-22 | End: 2023-09-22

## 2023-09-22 RX ORDER — MORPHINE SULFATE 50 MG/1
0.25 CAPSULE, EXTENDED RELEASE ORAL ONCE
Refills: 0 | Status: DISCONTINUED | OUTPATIENT
Start: 2023-09-22 | End: 2023-09-22

## 2023-09-22 RX ORDER — QUETIAPINE FUMARATE 200 MG/1
25 TABLET, FILM COATED ORAL AT BEDTIME
Refills: 0 | Status: DISCONTINUED | OUTPATIENT
Start: 2023-09-22 | End: 2023-09-25

## 2023-09-22 RX ORDER — METOPROLOL TARTRATE 50 MG
5 TABLET ORAL ONCE
Refills: 0 | Status: COMPLETED | OUTPATIENT
Start: 2023-09-22 | End: 2023-09-22

## 2023-09-22 RX ORDER — CHLORHEXIDINE GLUCONATE 213 G/1000ML
15 SOLUTION TOPICAL EVERY 12 HOURS
Refills: 0 | Status: DISCONTINUED | OUTPATIENT
Start: 2023-09-22 | End: 2023-09-22

## 2023-09-22 RX ADMIN — DEXMEDETOMIDINE HYDROCHLORIDE IN 0.9% SODIUM CHLORIDE 6.38 MICROGRAM(S)/KG/HR: 4 INJECTION INTRAVENOUS at 07:59

## 2023-09-22 RX ADMIN — ARGATROBAN 3.06 MICROGRAM(S)/KG/MIN: 50 INJECTION, SOLUTION INTRAVENOUS at 19:12

## 2023-09-22 RX ADMIN — CHLORHEXIDINE GLUCONATE 15 MILLILITER(S): 213 SOLUTION TOPICAL at 05:12

## 2023-09-22 RX ADMIN — Medication 12.5 MILLIGRAM(S): at 05:13

## 2023-09-22 RX ADMIN — Medication 10 MILLIGRAM(S): at 09:25

## 2023-09-22 RX ADMIN — CHLORHEXIDINE GLUCONATE 1 APPLICATION(S): 213 SOLUTION TOPICAL at 17:56

## 2023-09-22 RX ADMIN — Medication 400 MILLIGRAM(S): at 09:17

## 2023-09-22 RX ADMIN — PHENYLEPHRINE HYDROCHLORIDE 23.9 MICROGRAM(S)/KG/MIN: 10 INJECTION INTRAVENOUS at 07:59

## 2023-09-22 RX ADMIN — PHENYLEPHRINE HYDROCHLORIDE 23.9 MICROGRAM(S)/KG/MIN: 10 INJECTION INTRAVENOUS at 19:11

## 2023-09-22 RX ADMIN — PANTOPRAZOLE SODIUM 40 MILLIGRAM(S): 20 TABLET, DELAYED RELEASE ORAL at 12:31

## 2023-09-22 RX ADMIN — Medication 400 MILLIGRAM(S): at 17:47

## 2023-09-22 RX ADMIN — Medication 3 MILLILITER(S): at 08:58

## 2023-09-22 RX ADMIN — Medication 1000 MILLIGRAM(S): at 10:03

## 2023-09-22 RX ADMIN — MORPHINE SULFATE 0.25 MILLIGRAM(S): 50 CAPSULE, EXTENDED RELEASE ORAL at 14:35

## 2023-09-22 RX ADMIN — Medication 1 DROP(S): at 17:56

## 2023-09-22 RX ADMIN — Medication 80 MILLIGRAM(S): at 05:12

## 2023-09-22 RX ADMIN — Medication 2: at 12:32

## 2023-09-22 RX ADMIN — MORPHINE SULFATE 0.25 MILLIGRAM(S): 50 CAPSULE, EXTENDED RELEASE ORAL at 15:59

## 2023-09-22 RX ADMIN — BUMETANIDE 1 MILLIGRAM(S): 0.25 INJECTION INTRAMUSCULAR; INTRAVENOUS at 09:17

## 2023-09-22 RX ADMIN — ARGATROBAN 3.06 MICROGRAM(S)/KG/MIN: 50 INJECTION, SOLUTION INTRAVENOUS at 07:00

## 2023-09-22 RX ADMIN — Medication 3 MILLILITER(S): at 14:37

## 2023-09-22 RX ADMIN — Medication 3 MILLILITER(S): at 21:53

## 2023-09-22 RX ADMIN — Medication 1: at 17:53

## 2023-09-22 RX ADMIN — Medication 5 MILLIGRAM(S): at 23:20

## 2023-09-22 RX ADMIN — Medication 1 DROP(S): at 05:12

## 2023-09-22 RX ADMIN — HUMAN INSULIN 11 UNIT(S): 100 INJECTION, SUSPENSION SUBCUTANEOUS at 00:18

## 2023-09-22 RX ADMIN — Medication 3 MILLILITER(S): at 03:37

## 2023-09-22 NOTE — PROGRESS NOTE ADULT - ASSESSMENT
62-year-old female with atrial fibrillation, a history of sciatica, and a construction materials occupation was transferred for ECMO support due to severe respiratory distress. Initially admitted for acute hypoxemic respiratory failure and ILD-like findings, she responded to prednisone 40 mg daily but developed weakness/tachycardia with Mycophenolate mofetil 500mg BID (A fibr with RVR). Despite interventions, her condition worsened, intubated and ECMO transfer.    # Acute severe hepatitis -likely due to hypoperfusion   # MCTD with DAH    - Skin rash on chest with periungual ulcers, ILD, leukopenia, thrombocytopenia.   - Serology: ARIANA 1:1280 Speckled, dsDNA <12. + SM, + Anti-RNP, U1-snRNP RNP A 162, U1-snRNP RNP-70kd 111  - CT chest (8/28): Since August 26, 2023, again interstitial lung disease non-UIP pattern. Although no subpleural sparing, possibly interstitialpneumonia, differential includes NSIP associated with connective tissue disorders, chronic HP or drug toxicity.  - Bronchoscopy results (08/30): Bronchial tissue and interstitium showing organizing pneumonia and focal fibrin  - CT chest (9/11): Improved/decreased extent of bilateral groundglass opacities and reticular markings compared to the previous study. Findings are nonspecific but differential etiologies include interstitial pneumonia, drug toxicity, nonspecific connective tissue disorders. New small focus of parenchymal consolidation seen in the lingula;   possible small focal pneumonia.  - Repeat Bronchoscopy (9/13): BAL performed of lingula. Serial BAL x3 performed of RML with progressively bloody return.  - Lab showed thrombocytopenia since she developed respiratory failure ( normal on the first admission to [facility])   - LFT improving   - S/p Rituximab 1 gr on 9/16/23  - s/p plasmapheresis (First session (9/15), (9/18), (9/20)  - ID consulted - c/w meropenem  - 9/20/2023: lungs are improving clinically and radiographically. Repeat bronch without signs of DAH  - 9/22: Pt extubated and d/c ECMO    #pancytopenia. improving   - leukopenia improved s/p filgrastim   - persistent thrombocytopenia  - MAS is unlikely   - likely multifactorial: shock liver and/or medication induced      Recommendations:  - c/w solumedrol 80 mg qd, will consider taper next week  - will plan for next rituxan dose ~9/30/23  - will follow     Discussed with Attending Dr. Dominic Hollingsworth MD  PGY-[id number]  Rheumatology Fellow  [employer]  [phone]

## 2023-09-22 NOTE — PROGRESS NOTE ADULT - SUBJECTIVE AND OBJECTIVE BOX
CHIEF COMPLAINT:    Interval Events:    HPI:  62 year old female with a past medical history of afib, and sciatica, who presented to [facility] for shortness of breath. She had gone to [facility] where she had CXR which showed bilateral lower lobe infiltrates so was sent to ER. She had been having exertional dyspnea and hypoxemia (on home pulse ox to 82%). She had been having dyspnea for several months and had a workup in [place] with a CT scan (which was negative for PE). She also states that she was seen by a pulmonologist and rheumatology, where they ruled out lupus and other immunological disorders.    [facility] Hospital Course  Found to be hypoxic and have interstitial lung disease appearance on CT, admitted 8/26 for AHRF, further ILD workup and management. TTE 8/26 with normal LVEF. Pt was started on prednisone on admission with gradually improving O2 requirement and has been stable on 2-3LNC since 9/3. Started steroid taper on 9/6 and fully transitioned to PO 9/8 overnight. Started on Mycophenolate mofetil (cellcept) 9/8 evening but pt reported subjective side effects with weakness. Episode of AF w RVR with HR up to 140s on 9/11 am, decreased to HR 10-110s with IV lopressor 10. CTA negative for PE and showed interval improvements in GGO but possible lingular bleb and RML bronchiectasis. Patient received 2L of but before more fluids and beta-blocker pt developed heart palpitations with EKG HR 170s with SVT. BPs 105/70s. Did not respond to vagal maneuvers. Pt given oral lopressor 12.5 and IV lopressor 5, with improvement of HR to 130s. SBP briefly dropped to 60-70s then recovered. Patient on NRB offered HFNC/BiPAP but refused. Started on empiric vancomycin and zosyn. BCx w/ NGTD. Per pulm increased solumedrol to 40mg qd. Patient acceded to HFNC in which she desatted and presented with increased wob for which she was transitioned to BiPAP. Patient with anxiety while on BiPAP presenting tachypnea and desatting to the 80s despite verbal redirection for which she was administered ativan 1mg IVP x1. Patient distress improved with sats in the low 90s but had desaturation to 70s. STAT CXR showed increased pulmonary congestion for which patient was administered lasix without improvement. Intubated and started on mechanical ventilation but required very high PEEP (18) and 100% FiO2 and still had low saturations. VV ECMO team consulted and accepted to [facility] center. Per signout patient had RV enlargement and dysfunction on POCUS with concern for either new PE vs high pulmonary pressures due to PEEP 18 and lung dysfunction. Patient went for cannulation at [facility] with flouro showing no acute PE. Cannulated with 25 F drainage cannula in R Fem V and 23F “arterial” cannula in RIJ.    (13 Sep 2023 15:24)      REVIEW OF SYSTEMS:  Constitutional: [ ] negative [ ] fevers [ ] chills [ ] weight loss [ ] weight gain  HEENT: [ ] negative [ ] dry eyes [ ] eye irritation [ ] postnasal drip [ ] nasal congestion  CV: [ ] negative  [ ] chest pain [ ] orthopnea [ ] palpitations [ ] murmur  Resp: [ ] negative [ ] cough [ ] shortness of breath [ ] dyspnea [ ] wheezing [ ] sputum [ ] hemoptysis  GI: [ ] negative [ ] nausea [ ] vomiting [ ] diarrhea [ ] constipation [ ] abd pain [ ] dysphagia   : [ ] negative [ ] dysuria [ ] nocturia [ ] hematuria [ ] increased urinary frequency  Musculoskeletal: [ ] negative [ ] back pain [ ] myalgias [ ] arthralgias [ ] fracture  Skin: [ ] negative [ ] rash [ ] itch  Neurological: [ ] negative [ ] headache [ ] dizziness [ ] syncope [ ] weakness [ ] numbness  Psychiatric: [ ] negative [ ] anxiety [ ] depression  Endocrine: [ ] negative [ ] diabetes [ ] thyroid problem  Hematologic/Lymphatic: [ ] negative [ ] anemia [ ] bleeding problem  Allergic/Immunologic: [ ] negative [ ] itchy eyes [ ] nasal discharge [ ] hives [ ] angioedema  [ ] All other systems negative  [ ] Unable to assess ROS because ________    OBJECTIVE:  ICU Vital Signs Last 24 Hrs  T(C): 37.4 (22 Sep 2023 04:00), Max: 37.6 (21 Sep 2023 19:00)  T(F): 99.4 (22 Sep 2023 04:00), Max: 99.6 (21 Sep 2023 19:00)  HR: 96 (22 Sep 2023 05:20) (60 - 103)  BP: --  BP(mean): --  ABP: 123/64 (22 Sep 2023 05:20) (77/46 - 187/98)  ABP(mean): 86 (22 Sep 2023 05:20) (59 - 128)  RR: 32 (22 Sep 2023 05:20) (19 - 33)  SpO2: 95% (22 Sep 2023 05:20) (93% - 100%)    O2 Parameters below as of 22 Sep 2023 05:20  Patient On (Oxygen Delivery Method): ventilator, CPAP  O2 Flow (L/min): 40        Mode: AC/ CMV (Assist Control/ Continuous Mandatory Ventilation), RR (machine): 24, TV (machine): 450, FiO2: 40, PEEP: 8, MAP: 14, PIP: 30    09-20 @ 07:01  -  09-21 @ 07:00  --------------------------------------------------------  IN: 4869.4 mL / OUT: 4250 mL / NET: 619.4 mL    09-21 @ 07:01  -  09-22 @ 06:05  --------------------------------------------------------  IN: 1769.8 mL / OUT: 2650 mL / NET: -880.2 mL      CAPILLARY BLOOD GLUCOSE      POCT Blood Glucose.: 106 mg/dL (22 Sep 2023 05:18)      Physical Exam:   Constitutional: ill appearing, no acute distress   HEENT: + PERRLA, EOMI, no drainage or redness  Neck: supple,  No JVD  Respiratory: Ventilator assisted breath Sounds equal & clear bilaterally to auscultation, no accessory muscle use noted  Cardiovascular: Regular rate, regular rhythm, normal S1, S2; no murmurs or rub  Gastrointestinal: Soft, non-tender, non distended, no hepatosplenomegaly, normal bowel sounds  Extremities: + 2 peripheral edema, no cyanosis, no clubbing   Vascular: Equal and normal pulses: 2+ peripheral pulses throughout  Neurological: sedated/intubated  Psychiatric: calm, normal mood, normal affect  Musculoskeletal: No joint swelling or deformity; no limitation of movement  Skin: warm, dry, well perfused, no rashes    HOSPITAL MEDICATIONS:  Standing Meds:  albuterol/ipratropium for Nebulization 3 milliLiter(s) Nebulizer every 6 hours  argatroban Infusion 0.4 MICROgram(s)/kG/Min IV Continuous <Continuous>  artificial  tears Solution 1 Drop(s) Both EYES every 12 hours  atovaquone  Suspension 1500 milliGRAM(s) Oral daily  chlorhexidine 0.12% Liquid 15 milliLiter(s) Oral Mucosa every 12 hours  chlorhexidine 2% Cloths 1 Application(s) Topical <User Schedule>  dexMEDEtomidine Infusion 0.2 MICROgram(s)/kG/Hr IV Continuous <Continuous>  dextrose 5%. 1000 milliLiter(s) IV Continuous <Continuous>  dextrose 50% Injectable 12.5 Gram(s) IV Push once  dextrose 50% Injectable 25 Gram(s) IV Push once  dextrose 50% Injectable 25 Gram(s) IV Push once  folic acid 1 milliGRAM(s) Oral daily  glucagon  Injectable 1 milliGRAM(s) IntraMuscular once  insulin lispro (ADMELOG) corrective regimen sliding scale   SubCutaneous every 6 hours  insulin NPH human recombinant 11 Unit(s) SubCutaneous every 6 hours  methylPREDNISolone sodium succinate Injectable 80 milliGRAM(s) IV Push daily  metoprolol tartrate 12.5 milliGRAM(s) Oral two times a day  multivitamin/minerals/iron Oral Solution (CENTRUM) 15 milliLiter(s) Oral daily  pantoprazole  Injectable 40 milliGRAM(s) IV Push daily  phenylephrine    Infusion 0.499 MICROgram(s)/kG/Min IV Continuous <Continuous>  polyethylene glycol 3350 17 Gram(s) Oral <User Schedule>  senna Syrup 10 milliLiter(s) Oral two times a day      PRN Meds:  dextrose Oral Gel 15 Gram(s) Oral once PRN  diphenhydrAMINE Injectable 50 milliGRAM(s) IV Push once PRN  meperidine     Injectable 25 milliGRAM(s) IV Push once PRN      LABS:                        11.1   18.35 )-----------( 75       ( 22 Sep 2023 00:15 )             34.3     Hgb Trend: 11.1<--, 11.5<--, 11.2<--, 11.6<--, 11.7<--  09-22    137  |  101  |  37<H>  ----------------------------<  142<H>  4.8   |  30  |  0.41<L>    Ca    9.1      22 Sep 2023 00:15  Phos  2.6     09-22  Mg     2.30     09-22    TPro  4.9<L>  /  Alb  3.0<L>  /  TBili  16.3<H>  /  DBili  x   /  AST  136<H>  /  ALT  225<H>  /  AlkPhos  490<H>  09-22    Creatinine Trend: 0.41<--, 0.36<--, 0.35<--, 0.35<--, 0.33<--, 0.33<--  PT/INR - ( 22 Sep 2023 00:15 )   PT: 17.2 sec;   INR: 1.54 ratio         PTT - ( 22 Sep 2023 05:20 )  PTT:57.0 sec  Urinalysis Basic - ( 22 Sep 2023 00:15 )    Color: x / Appearance: x / SG: x / pH: x  Gluc: 142 mg/dL / Ketone: x  / Bili: x / Urobili: x   Blood: x / Protein: x / Nitrite: x   Leuk Esterase: x / RBC: x / WBC x   Sq Epi: x / Non Sq Epi: x / Bacteria: x      Arterial Blood Gas:  09-22 @ 05:20  7.46/44/115/31/98.7/6.7  ABG lactate: --  Arterial Blood Gas:  09-22 @ 00:15  7.51/41/90/33/98.1/8.9  ABG lactate: --  Arterial Blood Gas:  09-21 @ 13:05  7.47/44/77/32/96.2/7.5  ABG lactate: --  Arterial Blood Gas:  09-21 @ 10:15  7.49/41/86/31/97.5/7.2  ABG lactate: --  Arterial Blood Gas:  09-21 @ 08:43  7.49/42/254/32/98.8/7.9  ABG lactate: --  Arterial Blood Gas:  09-21 @ 04:15  7.50/42/133/33/99.2/8.7  ABG lactate: --  Arterial Blood Gas:  09-20 @ 23:05  7.49/45/93/34/96.9/9.7  ABG lactate: --  Arterial Blood Gas:  09-20 @ 18:30  7.53/39/91/33/99.0/9.2  ABG lactate: --  Arterial Blood Gas:  09-20 @ 16:15  7.53/42/84/35/97.3/11.3  ABG lactate: --  Arterial Blood Gas:  09-20 @ 10:40  7.48/42/96/31/98.4/7.1  ABG lactate: --  Arterial Blood Gas:  09-20 @ 09:00  7.46/46/99/33/97.5/7.8  ABG lactate: --        MICROBIOLOGY:     Culture - Bronchial (collected 20 Sep 2023 16:39)  Source: .Bronchial Bronchial Lavage  Gram Stain (20 Sep 2023 23:41):    No polymorphonuclear cells seen per low power field    No squamous epithelial cells per low power field    Rare Yeast per oil power field  Preliminary Report (21 Sep 2023 18:20):    Normal Respiratory Noreen present    Culture - Acid Fast - Bronchial w/Smear (collected 20 Sep 2023 16:39)  Source: .Bronchial Bronchial Lavage    Culture - Fungal, Bronchial (collected 20 Sep 2023 16:39)  Source: .Bronchial Bronchial Lavage  Preliminary Report (21 Sep 2023 06:33):    Testing in progress    Culture - Sputum (collected 19 Sep 2023 10:40)  Source: ET Tube ET Tube  Gram Stain (19 Sep 2023 23:20):    Few polymorphonuclear leukocytes per low power field    Few Squamous epithelial cells per low power field    Moderate Yeast per oil power field    Moderate Gram Positive Cocci in Clusters per oil power field  Final Report (21 Sep 2023 15:56):    Normal Respiratory Noreen present      RADIOLOGY:  [ ] Reviewed and interpreted by me           Interval Events:  -remains on low dose marianne and precedex  -IV tylenol for c/o generalized pain  -placed on PS 10/8/40%  -tube feeds held for possible extubation    HPI:  62 year old female with a past medical history of afib, and sciatica, who presented to [facility] for shortness of breath. She had gone to [facility] where she had CXR which showed bilateral lower lobe infiltrates so was sent to ER. She had been having exertional dyspnea and hypoxemia (on home pulse ox to 82%). She had been having dyspnea for several months and had a workup in [place] with a CT scan (which was negative for PE). She also states that she was seen by a pulmonologist and rheumatology, where they ruled out lupus and other immunological disorders.    [facility] Hospital Course  Found to be hypoxic and have interstitial lung disease appearance on CT, admitted 8/26 for AHRF, further ILD workup and management. TTE 8/26 with normal LVEF. Pt was started on prednisone on admission with gradually improving O2 requirement and has been stable on 2-3LNC since 9/3. Started steroid taper on 9/6 and fully transitioned to PO 9/8 overnight. Started on Mycophenolate mofetil (cellcept) 9/8 evening but pt reported subjective side effects with weakness. Episode of AF w RVR with HR up to 140s on 9/11 am, decreased to HR 10-110s with IV lopressor 10. CTA negative for PE and showed interval improvements in GGO but possible lingular bleb and RML bronchiectasis. Patient received 2L of but before more fluids and beta-blocker pt developed heart palpitations with EKG HR 170s with SVT. BPs 105/70s. Did not respond to vagal maneuvers. Pt given oral lopressor 12.5 and IV lopressor 5, with improvement of HR to 130s. SBP briefly dropped to 60-70s then recovered. Patient on NRB offered HFNC/BiPAP but refused. Started on empiric vancomycin and zosyn. BCx w/ NGTD. Per pulm increased solumedrol to 40mg qd. Patient acceded to HFNC in which she desatted and presented with increased wob for which she was transitioned to BiPAP. Patient with anxiety while on BiPAP presenting tachypnea and desatting to the 80s despite verbal redirection for which she was administered ativan 1mg IVP x1. Patient distress improved with sats in the low 90s but had desaturation to 70s. STAT CXR showed increased pulmonary congestion for which patient was administered lasix without improvement. Intubated and started on mechanical ventilation but required very high PEEP (18) and 100% FiO2 and still had low saturations. VV ECMO team consulted and accepted to [facility] center. Per signout patient had RV enlargement and dysfunction on POCUS with concern for either new PE vs high pulmonary pressures due to PEEP 18 and lung dysfunction. Patient went for cannulation at [facility] with flouro showing no acute PE. Cannulated with 25 F drainage cannula in R Fem V and 23F “arterial” cannula in RIJ.    (13 Sep 2023 15:24)      REVIEW OF SYSTEMS:  [ x] Unable to assess ROS because patient is intubated can only answer yes or no questions at this time         OBJECTIVE:  ICU Vital Signs Last 24 Hrs  T(C): 37.4 (22 Sep 2023 04:00), Max: 37.6 (21 Sep 2023 19:00)  T(F): 99.4 (22 Sep 2023 04:00), Max: 99.6 (21 Sep 2023 19:00)  HR: 96 (22 Sep 2023 05:20) (60 - 103)  BP: --  BP(mean): --  ABP: 123/64 (22 Sep 2023 05:20) (77/46 - 187/98)  ABP(mean): 86 (22 Sep 2023 05:20) (59 - 128)  RR: 32 (22 Sep 2023 05:20) (19 - 33)  SpO2: 95% (22 Sep 2023 05:20) (93% - 100%)    O2 Parameters below as of 22 Sep 2023 05:20  Patient On (Oxygen Delivery Method): ventilator, CPAP  O2 Flow (L/min): 40        Mode: AC/ CMV (Assist Control/ Continuous Mandatory Ventilation), RR (machine): 24, TV (machine): 450, FiO2: 40, PEEP: 8, MAP: 14, PIP: 30    09-20 @ 07:01  -  09-21 @ 07:00  --------------------------------------------------------  IN: 4869.4 mL / OUT: 4250 mL / NET: 619.4 mL    09-21 @ 07:01  -  09-22 @ 06:05  --------------------------------------------------------  IN: 1769.8 mL / OUT: 2650 mL / NET: -880.2 mL      CAPILLARY BLOOD GLUCOSE      POCT Blood Glucose.: 106 mg/dL (22 Sep 2023 05:18)      Physical Exam:   Constitutional: ill appearing, no acute distress   HEENT: conjunctivus  icterus, PERRLA , no drainage or redness  Neck: Right IJ post ecmo cannula site, supple, No JVD  Respiratory: Ventilator assisted breath Sounds dimished bilaterally to auscultation, no accessory muscle use noted  Cardiovascular: regular rate, intermittent irregular rhythm, normal S1, S2; no murmurs or rub  Gastrointestinal: obese, soft, tender,  normal bowel sounds  Extremities: + 2 peripheral edema, no cyanosis, no clubbing   Vascular: Equal and normal pulses: 2+ peripheral pulses throughout  Neurological: sedated/intubated, moves extremities   Psychiatric: calm, normal mood, normal affect  Musculoskeletal: No joint swelling or deformity; no limitation of movement  Skin: warm, dry, well perfused, no rashes, some ecchymotic areas       HOSPITAL MEDICATIONS:  Standing Meds:  albuterol/ipratropium for Nebulization 3 milliLiter(s) Nebulizer every 6 hours  argatroban Infusion 0.4 MICROgram(s)/kG/Min IV Continuous <Continuous>  artificial  tears Solution 1 Drop(s) Both EYES every 12 hours  atovaquone  Suspension 1500 milliGRAM(s) Oral daily  chlorhexidine 0.12% Liquid 15 milliLiter(s) Oral Mucosa every 12 hours  chlorhexidine 2% Cloths 1 Application(s) Topical <User Schedule>  dexMEDEtomidine Infusion 0.2 MICROgram(s)/kG/Hr IV Continuous <Continuous>  dextrose 5%. 1000 milliLiter(s) IV Continuous <Continuous>  dextrose 50% Injectable 12.5 Gram(s) IV Push once  dextrose 50% Injectable 25 Gram(s) IV Push once  dextrose 50% Injectable 25 Gram(s) IV Push once  folic acid 1 milliGRAM(s) Oral daily  glucagon  Injectable 1 milliGRAM(s) IntraMuscular once  insulin lispro (ADMELOG) corrective regimen sliding scale   SubCutaneous every 6 hours  insulin NPH human recombinant 11 Unit(s) SubCutaneous every 6 hours  methylPREDNISolone sodium succinate Injectable 80 milliGRAM(s) IV Push daily  metoprolol tartrate 12.5 milliGRAM(s) Oral two times a day  multivitamin/minerals/iron Oral Solution (CENTRUM) 15 milliLiter(s) Oral daily  pantoprazole  Injectable 40 milliGRAM(s) IV Push daily  phenylephrine    Infusion 0.499 MICROgram(s)/kG/Min IV Continuous <Continuous>  polyethylene glycol 3350 17 Gram(s) Oral <User Schedule>  senna Syrup 10 milliLiter(s) Oral two times a day      PRN Meds:  dextrose Oral Gel 15 Gram(s) Oral once PRN  diphenhydrAMINE Injectable 50 milliGRAM(s) IV Push once PRN  meperidine     Injectable 25 milliGRAM(s) IV Push once PRN      LABS:                        11.1   18.35 )-----------( 75       ( 22 Sep 2023 00:15 )             34.3     Hgb Trend: 11.1<--, 11.5<--, 11.2<--, 11.6<--, 11.7<--  09-22    137  |  101  |  37<H>  ----------------------------<  142<H>  4.8   |  30  |  0.41<L>    Ca    9.1      22 Sep 2023 00:15  Phos  2.6     09-22  Mg     2.30     09-22    TPro  4.9<L>  /  Alb  3.0<L>  /  TBili  16.3<H>  /  DBili  x   /  AST  136<H>  /  ALT  225<H>  /  AlkPhos  490<H>  09-22    Creatinine Trend: 0.41<--, 0.36<--, 0.35<--, 0.35<--, 0.33<--, 0.33<--  PT/INR - ( 22 Sep 2023 00:15 )   PT: 17.2 sec;   INR: 1.54 ratio         PTT - ( 22 Sep 2023 05:20 )  PTT:57.0 sec  Urinalysis Basic - ( 22 Sep 2023 00:15 )    Color: x / Appearance: x / SG: x / pH: x  Gluc: 142 mg/dL / Ketone: x  / Bili: x / Urobili: x   Blood: x / Protein: x / Nitrite: x   Leuk Esterase: x / RBC: x / WBC x   Sq Epi: x / Non Sq Epi: x / Bacteria: x      Arterial Blood Gas:  09-22 @ 05:20  7.46/44/115/31/98.7/6.7  ABG lactate: --  Arterial Blood Gas:  09-22 @ 00:15  7.51/41/90/33/98.1/8.9  ABG lactate: --  Arterial Blood Gas:  09-21 @ 13:05  7.47/44/77/32/96.2/7.5  ABG lactate: --  Arterial Blood Gas:  09-21 @ 10:15  7.49/41/86/31/97.5/7.2  ABG lactate: --  Arterial Blood Gas:  09-21 @ 08:43  7.49/42/254/32/98.8/7.9  ABG lactate: --  Arterial Blood Gas:  09-21 @ 04:15  7.50/42/133/33/99.2/8.7  ABG lactate: --  Arterial Blood Gas:  09-20 @ 23:05  7.49/45/93/34/96.9/9.7  ABG lactate: --  Arterial Blood Gas:  09-20 @ 18:30  7.53/39/91/33/99.0/9.2  ABG lactate: --  Arterial Blood Gas:  09-20 @ 16:15  7.53/42/84/35/97.3/11.3  ABG lactate: --  Arterial Blood Gas:  09-20 @ 10:40  7.48/42/96/31/98.4/7.1  ABG lactate: --  Arterial Blood Gas:  09-20 @ 09:00  7.46/46/99/33/97.5/7.8  ABG lactate: --        MICROBIOLOGY:     Culture - Bronchial (collected 20 Sep 2023 16:39)  Source: .Bronchial Bronchial Lavage  Gram Stain (20 Sep 2023 23:41):    No polymorphonuclear cells seen per low power field    No squamous epithelial cells per low power field    Rare Yeast per oil power field  Preliminary Report (21 Sep 2023 18:20):    Normal Respiratory Noreen present    Culture - Acid Fast - Bronchial w/Smear (collected 20 Sep 2023 16:39)  Source: .Bronchial Bronchial Lavage    Culture - Fungal, Bronchial (collected 20 Sep 2023 16:39)  Source: .Bronchial Bronchial Lavage  Preliminary Report (21 Sep 2023 06:33):    Testing in progress    Culture - Sputum (collected 19 Sep 2023 10:40)  Source: ET Tube ET Tube  Gram Stain (19 Sep 2023 23:20):    Few polymorphonuclear leukocytes per low power field    Few Squamous epithelial cells per low power field    Moderate Yeast per oil power field    Moderate Gram Positive Cocci in Clusters per oil power field  Final Report (21 Sep 2023 15:56):    Normal Respiratory Noreen present      RADIOLOGY:  [ ] Reviewed and interpreted by me           Interval Events:  -remains on low dose marianne and precedex  -IV tylenol for c/o generalized pain  -placed on PS 10/8/40%  -tube feeds held for possible extubation    HPI:  62 year old female with a past medical history of afib, and sciatica, who presented to [facility] for shortness of breath. She had gone to [facility] where she had CXR which showed bilateral lower lobe infiltrates so was sent to ER. She had been having exertional dyspnea and hypoxemia (on home pulse ox to 82%). She had been having dyspnea for several months and had a workup in [place] with a CT scan (which was negative for PE). She also states that she was seen by a pulmonologist and rheumatology, where they ruled out lupus and other immunological disorders.    [facility] Hospital Course  Found to be hypoxic and have interstitial lung disease appearance on CT, admitted 8/26 for AHRF, further ILD workup and management. TTE 8/26 with normal LVEF. Pt was started on prednisone on admission with gradually improving O2 requirement and has been stable on 2-3LNC since 9/3. Started steroid taper on 9/6 and fully transitioned to PO 9/8 overnight. Started on Mycophenolate mofetil (cellcept) 9/8 evening but pt reported subjective side effects with weakness. Episode of AF w RVR with HR up to 140s on 9/11 am, decreased to HR 10-110s with IV lopressor 10. CTA negative for PE and showed interval improvements in GGO but possible lingular bleb and RML bronchiectasis. Patient received 2L of but before more fluids and beta-blocker pt developed heart palpitations with EKG HR 170s with SVT. BPs 105/70s. Did not respond to vagal maneuvers. Pt given oral lopressor 12.5 and IV lopressor 5, with improvement of HR to 130s. SBP briefly dropped to 60-70s then recovered. Patient on NRB offered HFNC/BiPAP but refused. Started on empiric vancomycin and zosyn. BCx w/ NGTD. Per pulm increased solumedrol to 40mg qd. Patient acceded to HFNC in which she desatted and presented with increased wob for which she was transitioned to BiPAP. Patient with anxiety while on BiPAP presenting tachypnea and desatting to the 80s despite verbal redirection for which she was administered ativan 1mg IVP x1. Patient distress improved with sats in the low 90s but had desaturation to 70s. STAT CXR showed increased pulmonary congestion for which patient was administered lasix without improvement. Intubated and started on mechanical ventilation but required very high PEEP (18) and 100% FiO2 and still had low saturations. VV ECMO team consulted and accepted to [facility] center. Per signout patient had RV enlargement and dysfunction on POCUS with concern for either new PE vs high pulmonary pressures due to PEEP 18 and lung dysfunction. Patient went for cannulation at [facility] with flouro showing no acute PE. Cannulated with 25 F drainage cannula in R Fem V and 23F “arterial” cannula in RIJ.    (13 Sep 2023 15:24)      REVIEW OF SYSTEMS:  [ x] Unable to assess ROS because patient is intubated can only answer yes or no questions at this time         OBJECTIVE:  ICU Vital Signs Last 24 Hrs  T(C): 37.4 (22 Sep 2023 04:00), Max: 37.6 (21 Sep 2023 19:00)  T(F): 99.4 (22 Sep 2023 04:00), Max: 99.6 (21 Sep 2023 19:00)  HR: 96 (22 Sep 2023 05:20) (60 - 103)  BP: --  BP(mean): --  ABP: 123/64 (22 Sep 2023 05:20) (77/46 - 187/98)  ABP(mean): 86 (22 Sep 2023 05:20) (59 - 128)  RR: 32 (22 Sep 2023 05:20) (19 - 33)  SpO2: 95% (22 Sep 2023 05:20) (93% - 100%)    O2 Parameters below as of 22 Sep 2023 05:20  Patient On (Oxygen Delivery Method): ventilator, CPAP  O2 Flow (L/min): 40        Mode: AC/ CMV (Assist Control/ Continuous Mandatory Ventilation), RR (machine): 24, TV (machine): 450, FiO2: 40, PEEP: 8, MAP: 14, PIP: 30    09-20 @ 07:01  -  09-21 @ 07:00  --------------------------------------------------------  IN: 4869.4 mL / OUT: 4250 mL / NET: 619.4 mL    09-21 @ 07:01 - 09-22 @ 06:05  --------------------------------------------------------  IN: 1769.8 mL / OUT: 2650 mL / NET: -880.2 mL      CAPILLARY BLOOD GLUCOSE      POCT Blood Glucose.: 106 mg/dL (22 Sep 2023 05:18)      Physical Exam:   Constitutional: ill appearing, no acute distress   HEENT: conjunctivus  icterus, PERRLA , no drainage or redness  Neck: Right IJ post ecmo cannula site, supple, No JVD  Respiratory: Ventilator assisted breath Sounds diminished bilaterally to auscultation, no accessory muscle use noted  Cardiovascular: regular rate, intermittent irregular rhythm, normal S1, S2; no murmurs or rub  Gastrointestinal: obese, soft, tender,  normal bowel sounds  Extremities: + 2 peripheral edema, no cyanosis, no clubbing   Vascular: Equal and normal pulses: 2+ peripheral pulses throughout  Neurological: sedated/intubated, moves extremities   Psychiatric: calm, normal mood, normal affect  Musculoskeletal: No joint swelling or deformity; b/l upper and lower extremity weakness   Skin: warm, dry, well perfused, no rashes, some ecchymotic areas       HOSPITAL MEDICATIONS:  Standing Meds:  albuterol/ipratropium for Nebulization 3 milliLiter(s) Nebulizer every 6 hours  argatroban Infusion 0.4 MICROgram(s)/kG/Min IV Continuous <Continuous>  artificial  tears Solution 1 Drop(s) Both EYES every 12 hours  atovaquone  Suspension 1500 milliGRAM(s) Oral daily  chlorhexidine 0.12% Liquid 15 milliLiter(s) Oral Mucosa every 12 hours  chlorhexidine 2% Cloths 1 Application(s) Topical <User Schedule>  dexMEDEtomidine Infusion 0.2 MICROgram(s)/kG/Hr IV Continuous <Continuous>  dextrose 5%. 1000 milliLiter(s) IV Continuous <Continuous>  dextrose 50% Injectable 12.5 Gram(s) IV Push once  dextrose 50% Injectable 25 Gram(s) IV Push once  dextrose 50% Injectable 25 Gram(s) IV Push once  folic acid 1 milliGRAM(s) Oral daily  glucagon  Injectable 1 milliGRAM(s) IntraMuscular once  insulin lispro (ADMELOG) corrective regimen sliding scale   SubCutaneous every 6 hours  insulin NPH human recombinant 11 Unit(s) SubCutaneous every 6 hours  methylPREDNISolone sodium succinate Injectable 80 milliGRAM(s) IV Push daily  metoprolol tartrate 12.5 milliGRAM(s) Oral two times a day  multivitamin/minerals/iron Oral Solution (CENTRUM) 15 milliLiter(s) Oral daily  pantoprazole  Injectable 40 milliGRAM(s) IV Push daily  phenylephrine    Infusion 0.499 MICROgram(s)/kG/Min IV Continuous <Continuous>  polyethylene glycol 3350 17 Gram(s) Oral <User Schedule>  senna Syrup 10 milliLiter(s) Oral two times a day      PRN Meds:  dextrose Oral Gel 15 Gram(s) Oral once PRN  diphenhydrAMINE Injectable 50 milliGRAM(s) IV Push once PRN  meperidine     Injectable 25 milliGRAM(s) IV Push once PRN      LABS:                        11.1   18.35 )-----------( 75       ( 22 Sep 2023 00:15 )             34.3     Hgb Trend: 11.1<--, 11.5<--, 11.2<--, 11.6<--, 11.7<--  09-22    137  |  101  |  37<H>  ----------------------------<  142<H>  4.8   |  30  |  0.41<L>    Ca    9.1      22 Sep 2023 00:15  Phos  2.6     09-22  Mg     2.30     09-22    TPro  4.9<L>  /  Alb  3.0<L>  /  TBili  16.3<H>  /  DBili  x   /  AST  136<H>  /  ALT  225<H>  /  AlkPhos  490<H>  09-22    Creatinine Trend: 0.41<--, 0.36<--, 0.35<--, 0.35<--, 0.33<--, 0.33<--  PT/INR - ( 22 Sep 2023 00:15 )   PT: 17.2 sec;   INR: 1.54 ratio         PTT - ( 22 Sep 2023 05:20 )  PTT:57.0 sec  Urinalysis Basic - ( 22 Sep 2023 00:15 )    Color: x / Appearance: x / SG: x / pH: x  Gluc: 142 mg/dL / Ketone: x  / Bili: x / Urobili: x   Blood: x / Protein: x / Nitrite: x   Leuk Esterase: x / RBC: x / WBC x   Sq Epi: x / Non Sq Epi: x / Bacteria: x      Arterial Blood Gas:  09-22 @ 05:20  7.46/44/115/31/98.7/6.7  ABG lactate: --  Arterial Blood Gas:  09-22 @ 00:15  7.51/41/90/33/98.1/8.9  ABG lactate: --  Arterial Blood Gas:  09-21 @ 13:05  7.47/44/77/32/96.2/7.5  ABG lactate: --  Arterial Blood Gas:  09-21 @ 10:15  7.49/41/86/31/97.5/7.2  ABG lactate: --  Arterial Blood Gas:  09-21 @ 08:43  7.49/42/254/32/98.8/7.9  ABG lactate: --  Arterial Blood Gas:  09-21 @ 04:15  7.50/42/133/33/99.2/8.7  ABG lactate: --  Arterial Blood Gas:  09-20 @ 23:05  7.49/45/93/34/96.9/9.7  ABG lactate: --  Arterial Blood Gas:  09-20 @ 18:30  7.53/39/91/33/99.0/9.2  ABG lactate: --  Arterial Blood Gas:  09-20 @ 16:15  7.53/42/84/35/97.3/11.3  ABG lactate: --  Arterial Blood Gas:  09-20 @ 10:40  7.48/42/96/31/98.4/7.1  ABG lactate: --  Arterial Blood Gas:  09-20 @ 09:00  7.46/46/99/33/97.5/7.8  ABG lactate: --        MICROBIOLOGY:     Culture - Bronchial (collected 20 Sep 2023 16:39)  Source: .Bronchial Bronchial Lavage  Gram Stain (20 Sep 2023 23:41):    No polymorphonuclear cells seen per low power field    No squamous epithelial cells per low power field    Rare Yeast per oil power field  Preliminary Report (21 Sep 2023 18:20):    Normal Respiratory Noreen present    Culture - Acid Fast - Bronchial w/Smear (collected 20 Sep 2023 16:39)  Source: .Bronchial Bronchial Lavage    Culture - Fungal, Bronchial (collected 20 Sep 2023 16:39)  Source: .Bronchial Bronchial Lavage  Preliminary Report (21 Sep 2023 06:33):    Testing in progress    Culture - Sputum (collected 19 Sep 2023 10:40)  Source: ET Tube ET Tube  Gram Stain (19 Sep 2023 23:20):    Few polymorphonuclear leukocytes per low power field    Few Squamous epithelial cells per low power field    Moderate Yeast per oil power field    Moderate Gram Positive Cocci in Clusters per oil power field  Final Report (21 Sep 2023 15:56):    Normal Respiratory Noreen present      RADIOLOGY:  [ ] Reviewed and interpreted by me           Interval Events:  -remains on low dose marianne and precedex  -IV tylenol for c/o generalized pain  -placed on PS 10/8/40%  -tube feeds held for possible extubation    HPI:  62 year old female with a past medical history of afib, and sciatica, who presented to [facility] for shortness of breath. She had gone to [facility] where she had CXR which showed bilateral lower lobe infiltrates so was sent to ER. She had been having exertional dyspnea and hypoxemia (on home pulse ox to 82%). She had been having dyspnea for several months and had a workup in [place] with a CT scan (which was negative for PE). She also states that she was seen by a pulmonologist and rheumatology, where they ruled out lupus and other immunological disorders.    [facility] Hospital Course  Found to be hypoxic and have interstitial lung disease appearance on CT, admitted 8/26 for AHRF, further ILD workup and management. TTE 8/26 with normal LVEF. Pt was started on prednisone on admission with gradually improving O2 requirement and has been stable on 2-3LNC since 9/3. Started steroid taper on 9/6 and fully transitioned to PO 9/8 overnight. Started on Mycophenolate mofetil (cellcept) 9/8 evening but pt reported subjective side effects with weakness. Episode of AF w RVR with HR up to 140s on 9/11 am, decreased to HR 10-110s with IV lopressor 10. CTA negative for PE and showed interval improvements in GGO but possible lingular bleb and RML bronchiectasis. Patient received 2L of but before more fluids and beta-blocker pt developed heart palpitations with EKG HR 170s with SVT. BPs 105/70s. Did not respond to vagal maneuvers. Pt given oral lopressor 12.5 and IV lopressor 5, with improvement of HR to 130s. SBP briefly dropped to 60-70s then recovered. Patient on NRB offered HFNC/BiPAP but refused. Started on empiric vancomycin and zosyn. BCx w/ NGTD. Per pulm increased solumedrol to 40mg qd. Patient acceded to HFNC in which she desatted and presented with increased wob for which she was transitioned to BiPAP. Patient with anxiety while on BiPAP presenting tachypnea and desatting to the 80s despite verbal redirection for which she was administered ativan 1mg IVP x1. Patient distress improved with sats in the low 90s but had desaturation to 70s. STAT CXR showed increased pulmonary congestion for which patient was administered lasix without improvement. Intubated and started on mechanical ventilation but required very high PEEP (18) and 100% FiO2 and still had low saturations. VV ECMO team consulted and accepted to [facility]. Per signout patient had RV enlargement and dysfunction on POCUS with concern for either new PE vs high pulmonary pressures due to PEEP 18 and lung dysfunction. Patient went for cannulation at [facility] with flouro showing no acute PE. Cannulated with 25 F drainage cannula in R Fem V and 23F “arterial” cannula in RIJ.    (13 Sep 2023 15:24)      REVIEW OF SYSTEMS:  [ x] Unable to assess ROS because patient is intubated can only answer yes or no questions at this time         OBJECTIVE:  ICU Vital Signs Last 24 Hrs  T(C): 37.4 (22 Sep 2023 04:00), Max: 37.6 (21 Sep 2023 19:00)  T(F): 99.4 (22 Sep 2023 04:00), Max: 99.6 (21 Sep 2023 19:00)  HR: 96 (22 Sep 2023 05:20) (60 - 103)  BP: --  BP(mean): --  ABP: 123/64 (22 Sep 2023 05:20) (77/46 - 187/98)  ABP(mean): 86 (22 Sep 2023 05:20) (59 - 128)  RR: 32 (22 Sep 2023 05:20) (19 - 33)  SpO2: 95% (22 Sep 2023 05:20) (93% - 100%)    O2 Parameters below as of 22 Sep 2023 05:20  Patient On (Oxygen Delivery Method): ventilator, CPAP  O2 Flow (L/min): 40        Mode: AC/ CMV (Assist Control/ Continuous Mandatory Ventilation), RR (machine): 24, TV (machine): 450, FiO2: 40, PEEP: 8, MAP: 14, PIP: 30    09-20 @ 07:01  -  09-21 @ 07:00  --------------------------------------------------------  IN: 4869.4 mL / OUT: 4250 mL / NET: 619.4 mL    09-21 @ 07:01 - 09-22 @ 06:05  --------------------------------------------------------  IN: 1769.8 mL / OUT: 2650 mL / NET: -880.2 mL      CAPILLARY BLOOD GLUCOSE      POCT Blood Glucose.: 106 mg/dL (22 Sep 2023 05:18)      Physical Exam:   Constitutional: ill appearing, no acute distress   HEENT: conjunctivus  icterus, PERRLA , no drainage or redness  Neck: Right IJ post ecmo cannula site sutures, supple, No JVD  Respiratory: Ventilator assisted breath Sounds diminished bilaterally to auscultation, no accessory muscle use noted  Cardiovascular: regular rate, intermittent irregular rhythm, normal S1, S2; no murmurs or rub  Gastrointestinal: obese, soft, tender,  normal bowel sounds  Extremities: + 2 peripheral edema, no cyanosis, no clubbing   Vascular: Equal and normal pulses: 2+ peripheral pulses throughout  Neurological: sedated/intubated, moves extremities   Psychiatric: calm, normal mood, normal affect  Musculoskeletal: No joint swelling or deformity; b/l upper and lower extremity weakness   Skin: warm, dry, well perfused, no rashes, some ecchymotic areas, right fem post cannulation suture site      HOSPITAL MEDICATIONS:  Standing Meds:  albuterol/ipratropium for Nebulization 3 milliLiter(s) Nebulizer every 6 hours  argatroban Infusion 0.4 MICROgram(s)/kG/Min IV Continuous <Continuous>  artificial  tears Solution 1 Drop(s) Both EYES every 12 hours  atovaquone  Suspension 1500 milliGRAM(s) Oral daily  chlorhexidine 0.12% Liquid 15 milliLiter(s) Oral Mucosa every 12 hours  chlorhexidine 2% Cloths 1 Application(s) Topical <User Schedule>  dexMEDEtomidine Infusion 0.2 MICROgram(s)/kG/Hr IV Continuous <Continuous>  dextrose 5%. 1000 milliLiter(s) IV Continuous <Continuous>  dextrose 50% Injectable 12.5 Gram(s) IV Push once  dextrose 50% Injectable 25 Gram(s) IV Push once  dextrose 50% Injectable 25 Gram(s) IV Push once  folic acid 1 milliGRAM(s) Oral daily  glucagon  Injectable 1 milliGRAM(s) IntraMuscular once  insulin lispro (ADMELOG) corrective regimen sliding scale   SubCutaneous every 6 hours  insulin NPH human recombinant 11 Unit(s) SubCutaneous every 6 hours  methylPREDNISolone sodium succinate Injectable 80 milliGRAM(s) IV Push daily  metoprolol tartrate 12.5 milliGRAM(s) Oral two times a day  multivitamin/minerals/iron Oral Solution (CENTRUM) 15 milliLiter(s) Oral daily  pantoprazole  Injectable 40 milliGRAM(s) IV Push daily  phenylephrine    Infusion 0.499 MICROgram(s)/kG/Min IV Continuous <Continuous>  polyethylene glycol 3350 17 Gram(s) Oral <User Schedule>  senna Syrup 10 milliLiter(s) Oral two times a day      PRN Meds:  dextrose Oral Gel 15 Gram(s) Oral once PRN  diphenhydrAMINE Injectable 50 milliGRAM(s) IV Push once PRN  meperidine     Injectable 25 milliGRAM(s) IV Push once PRN      LABS:                        11.1   18.35 )-----------( 75       ( 22 Sep 2023 00:15 )             34.3     Hgb Trend: 11.1<--, 11.5<--, 11.2<--, 11.6<--, 11.7<--  09-22    137  |  101  |  37<H>  ----------------------------<  142<H>  4.8   |  30  |  0.41<L>    Ca    9.1      22 Sep 2023 00:15  Phos  2.6     09-22  Mg     2.30     09-22    TPro  4.9<L>  /  Alb  3.0<L>  /  TBili  16.3<H>  /  DBili  x   /  AST  136<H>  /  ALT  225<H>  /  AlkPhos  490<H>  09-22    Creatinine Trend: 0.41<--, 0.36<--, 0.35<--, 0.35<--, 0.33<--, 0.33<--  PT/INR - ( 22 Sep 2023 00:15 )   PT: 17.2 sec;   INR: 1.54 ratio         PTT - ( 22 Sep 2023 05:20 )  PTT:57.0 sec  Urinalysis Basic - ( 22 Sep 2023 00:15 )    Color: x / Appearance: x / SG: x / pH: x  Gluc: 142 mg/dL / Ketone: x  / Bili: x / Urobili: x   Blood: x / Protein: x / Nitrite: x   Leuk Esterase: x / RBC: x / WBC x   Sq Epi: x / Non Sq Epi: x / Bacteria: x      Arterial Blood Gas:  09-22 @ 05:20  7.46/44/115/31/98.7/6.7  ABG lactate: --  Arterial Blood Gas:  09-22 @ 00:15  7.51/41/90/33/98.1/8.9  ABG lactate: --  Arterial Blood Gas:  09-21 @ 13:05  7.47/44/77/32/96.2/7.5  ABG lactate: --  Arterial Blood Gas:  09-21 @ 10:15  7.49/41/86/31/97.5/7.2  ABG lactate: --  Arterial Blood Gas:  09-21 @ 08:43  7.49/42/254/32/98.8/7.9  ABG lactate: --  Arterial Blood Gas:  09-21 @ 04:15  7.50/42/133/33/99.2/8.7  ABG lactate: --  Arterial Blood Gas:  09-20 @ 23:05  7.49/45/93/34/96.9/9.7  ABG lactate: --  Arterial Blood Gas:  09-20 @ 18:30  7.53/39/91/33/99.0/9.2  ABG lactate: --  Arterial Blood Gas:  09-20 @ 16:15  7.53/42/84/35/97.3/11.3  ABG lactate: --  Arterial Blood Gas:  09-20 @ 10:40  7.48/42/96/31/98.4/7.1  ABG lactate: --  Arterial Blood Gas:  09-20 @ 09:00  7.46/46/99/33/97.5/7.8  ABG lactate: --        MICROBIOLOGY:     Culture - Bronchial (collected 20 Sep 2023 16:39)  Source: .Bronchial Bronchial Lavage  Gram Stain (20 Sep 2023 23:41):    No polymorphonuclear cells seen per low power field    No squamous epithelial cells per low power field    Rare Yeast per oil power field  Preliminary Report (21 Sep 2023 18:20):    Normal Respiratory Noreen present    Culture - Acid Fast - Bronchial w/Smear (collected 20 Sep 2023 16:39)  Source: .Bronchial Bronchial Lavage    Culture - Fungal, Bronchial (collected 20 Sep 2023 16:39)  Source: .Bronchial Bronchial Lavage  Preliminary Report (21 Sep 2023 06:33):    Testing in progress    Culture - Sputum (collected 19 Sep 2023 10:40)  Source: ET Tube ET Tube  Gram Stain (19 Sep 2023 23:20):    Few polymorphonuclear leukocytes per low power field    Few Squamous epithelial cells per low power field    Moderate Yeast per oil power field    Moderate Gram Positive Cocci in Clusters per oil power field  Final Report (21 Sep 2023 15:56):    Normal Respiratory Noreen present      RADIOLOGY:  [ ] Reviewed and interpreted by me

## 2023-09-22 NOTE — PROGRESS NOTE ADULT - ATTENDING COMMENTS
as above    Dr. Lala Alfaro  Rheumatology Attending  [phone]  [email or web address]

## 2023-09-22 NOTE — PROGRESS NOTE ADULT - SUBJECTIVE AND OBJECTIVE BOX
INTERVAL HX:  Patient is extubated today, still on precedex and levophed  on HFNC, weaning off  S/p 3 PLEX      MEDICATIONS  (STANDING):  albuterol/ipratropium for Nebulization 3 milliLiter(s) Nebulizer every 6 hours  argatroban Infusion 0.4 MICROgram(s)/kG/Min (3.06 mL/Hr) IV Continuous <Continuous>  artificial  tears Solution 1 Drop(s) Both EYES every 12 hours  atovaquone  Suspension 1500 milliGRAM(s) Oral daily  chlorhexidine 2% Cloths 1 Application(s) Topical <User Schedule>  dexMEDEtomidine Infusion 0.2 MICROgram(s)/kG/Hr (6.38 mL/Hr) IV Continuous <Continuous>  dextrose 5%. 1000 milliLiter(s) (50 mL/Hr) IV Continuous <Continuous>  dextrose 50% Injectable 12.5 Gram(s) IV Push once  dextrose 50% Injectable 25 Gram(s) IV Push once  dextrose 50% Injectable 25 Gram(s) IV Push once  folic acid 1 milliGRAM(s) Oral daily  glucagon  Injectable 1 milliGRAM(s) IntraMuscular once  insulin lispro (ADMELOG) corrective regimen sliding scale   SubCutaneous every 6 hours  insulin NPH human recombinant 11 Unit(s) SubCutaneous every 6 hours  methylPREDNISolone sodium succinate Injectable 80 milliGRAM(s) IV Push daily  metoprolol tartrate 12.5 milliGRAM(s) Oral two times a day  multivitamin/minerals/iron Oral Solution (CENTRUM) 15 milliLiter(s) Oral daily  pantoprazole  Injectable 40 milliGRAM(s) IV Push daily  phenylephrine    Infusion 0.499 MICROgram(s)/kG/Min (23.9 mL/Hr) IV Continuous <Continuous>  polyethylene glycol 3350 17 Gram(s) Oral <User Schedule>  QUEtiapine 25 milliGRAM(s) Oral at bedtime  senna Syrup 10 milliLiter(s) Oral two times a day    MEDICATIONS  (PRN):  dextrose Oral Gel 15 Gram(s) Oral once PRN Blood Glucose LESS THAN 70 milliGRAM(s)/deciliter      Allergies    No Known Allergies    Intolerances        PERTINENT MEDICATION HISTORY:      Social History:      PAST MEDICAL & SURGICAL HISTORY:      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EL HISTORY:    FAMILY HISTORY:      Vital Signs Last 24 Hrs  T(C): 37.1 (22 Sep 2023 12:00), Max: 37.6 (21 Sep 2023 19:00)  T(F): 98.7 (22 Sep 2023 12:00), Max: 99.6 (21 Sep 2023 19:00)  HR: 98 (22 Sep 2023 15:00) (69 - 103)  BP: --  BP(mean): --  RR: 32 (22 Sep 2023 15:00) (17 - 39)  SpO2: 96% (22 Sep 2023 15:00) (93% - 100%)    Parameters below as of 22 Sep 2023 14:38  Patient On (Oxygen Delivery Method): nasal cannula, high flow  O2 Flow (L/min): 60  O2 Concentration (%): 40    Physical Exam:  General: No apparent distress  HEENT: EOMI, MMM  CVS: +S1/S2, RRR, no murmurs/rubs/gallops  Resp: CTA b/l. No crackles/wheezing  GI: Soft, NT/ND +BS  MSK: no swelling/warmth/erythema of the joints of the UE/LE  Neuro: AAOx3  Skin: red rash on knuckles with multiple periungual ulcers                         12.9   22.21 )-----------( 82       ( 22 Sep 2023 11:00 )             39.5     09-22    132<L>  |  94<L>  |  33<H>  ----------------------------<  220<H>  4.6   |  25  |  0.41<L>    Ca    9.3      22 Sep 2023 11:00  Phos  3.3     09-22  Mg     2.30     09-22    TPro  5.5<L>  /  Alb  3.3  /  TBili  19.3<H>  /  DBili  14.2<H>  /  AST  156<H>  /  ALT  276<H>  /  AlkPhos  579<H>  09-22    PT/INR - ( 22 Sep 2023 07:55 )   PT: 19.9 sec;   INR: 1.81 ratio         PTT - ( 22 Sep 2023 07:55 )  PTT:55.6 sec  Urinalysis Basic - ( 22 Sep 2023 11:00 )    Color: x / Appearance: x / SG: x / pH: x  Gluc: 220 mg/dL / Ketone: x  / Bili: x / Urobili: x   Blood: x / Protein: x / Nitrite: x   Leuk Esterase: x / RBC: x / WBC x   Sq Epi: x / Non Sq Epi: x / Bacteria: x            Rheumatology Work Up    Creatine Kinase, Serum: 31 U/L [25 - 170] (09-22-23 @ 00:15)  C-Reactive Protein, Serum: 105.7 mg/L *H* (09-16-23 @ 11:35)  Sedimentation Rate, Erythrocyte: 10 mm/hr [4 - 25] (09-16-23 @ 11:35)  Beta 2 Glycoprotein 1 Antibody Screen: Negative (09-14-23 @ 13:31)    RADIOLOGY & ADDITIONAL STUDIES:    < from: US Abdomen Upper Quadrant Right (09.22.23 @ 14:57) >  IMPRESSION:  Pancreas is not visualized. No other abnormality.    < end of copied text >    < from: Xray Chest 1 View- PORTABLE-Routine (Xray Chest 1 View- PORTABLE-Routine .) (09.20.23 @ 09:38) >  FINDINGS:  Superior and inferior vena cava ECMO catheters are present.    An endotracheal and enteric tube are also seen.    Lungs show no focal abnormalities although periphery of left lung shows   possible small loculated effusion.    No pneumothorax.    < end of copied text >

## 2023-09-22 NOTE — CHART NOTE - NSCHARTNOTEFT_GEN_A_CORE
62 F with afib, recently diagnosed with MCTD-ILD (anti RNP) admitted to [facility] with acute hypoxemic respiratory failure c/b acute hypoxemic and hypercapnic respiratory failure requiring intubation and VVECMO on 9/13, tx to [facility], concerning for DAH vs ILD flare, decannulated from vvecmo 9/21, extubated morning of 9/22 to HFNC    patient alert, following commands, but delirious    #neuro - continue to lighten precedex as tolerated, likely acute metabolic encephalopathy causing delirium, supportive care    #CV: keep lopressor at 12.5 mg po bid; might've been sedation/med related, resolving now.  TTE LV normal.    #pulm: lung compliance improving; c/w HFNC for now, avoid acute pulmonary edema, wean HFNC as tolerated     # ECMO: has acute R IJ thrombus post decannulation so will c/w systemic a/c    #ID: repeat MRSA swab negative, f/u bronch culture, may d/c vanco, stop donald    #heme: unclear etiology of pancytopenia ([facility] labs sent vs critical illness); s/p neupogen, monitor for now, transfuse plt goal > 50, Hgb goal ~9-10; ANC improving as is platelets; c/w argatroban for now    #rheum: lungs significantly improved, plan for 3 plex sessions and slow steroid taper, received rituxan Sunday and receiving last (3rd session 9/20); will d/w rheum re steroid plan and rituxan plan; repeat bronch 9/20 still with some DAH but unclear if it is a capillaritis process or not, c/w solumedrol 80 mg iv daily for now, mepron for pcp ppx    #renal: keep net even from fluid standpoint    #endo: c/w basal bolus but adjust for npo    #GI: ast/alt lateral but bilirubin increasing, monitor for now, restart tube feeds vs s/s eval as tolerated    Critically ill patient requiring frequent bedside visits with therapy changes.

## 2023-09-22 NOTE — PROGRESS NOTE ADULT - ASSESSMENT
63 yo F w/ Afib initially presented to urgent care for SOB where she had an XR done concerning for b/l lower infiltrates, sent to [facility] ED and admitted to  on 8/26 after being found to be hypoxemic, reported having  debilitating b/l hand numbness/tingling and some muscles weakness several months. She was found to have interstitial lung disease appearance on CT, with the plan for further ILD workup and management, started on prednisone on admission with gradually improving O2 requirements and stable on 2-3LNC. She underwent bronchoscopy with TBBX on 8/30 which showed Non-Specific Intersitial Disease (NSIP)/OP. Labs notable for positive ARIANA 1:1280, RNP > 8, Alejandro > 8. Patient continued on steroids and received cellcept, which was discontinued 2/2 Afib-RVR. Interval CTA chest on 9/11 also negative PE did show possible lingula pneumonia. Started on empiric vancomycin and zosyn 9/12, course was then c/b episode of SVT to 170s w/ rapidly progressive hypoxemic respiratory failure, placed NRB offered HFNC/BiPAP but refused, leading to worsening hypoxemic respiratory failure requiring intubation on 9/13. Despite best practices, patient remained hypoxemic and patient was cannulated for VV-ECMO on 9/13 and transferred to [facility] for further management. 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 Showed significant lung improvement and was decannulated 9/21.    NEUROLOGY  Home meds: gabapentin 100g TID  AA&Ox3 at baseline   - following commands on precedex   - CTH 9/14 no acute findings  - Palliative following  - Social Work consult  - PT/OT following    PULMONARY  Admitted to [facility] on 8/26 after p/w SOB, found to be hypoxemic, required 2-4L NC/bibap, initially responded well to IV steroids. 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 2/2 bacterial PNA vs atypical PNA vs aspiration vs AEILD continued to show signficant improvement from a lung perspective and was decannulated 9/21.    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, occupation in construction  - pt reportedly had been seen by a pulmonologist and rheumatology ([place]), and was ruled out for lupus and PE  - CT findings at OSH consistent with ILD   - Current ventilator settings: PS  RR 12, PS 15, PEEP 12 Fi02 40% pulling TV ~350-400's PS trial as tolerated   - Bronchoscopy 9/13 showed mild thick yellow secretions in trachea, diffuse moderate thin green/brown secretions throughout, serial BAL x3 performed of RML with progressively bloody return indicating DAH likely 2/2 MCTD  - rheum following for DAH  - continue with duonebs,  trialed IPV but vomited shortly after initiation, now discontinued,  - c/w empiric abx   -s/p decannulation on 9/21 with plan for suture removal ~7-10 days       CARDIOVASCULAR  Hx of Afib w at OSH, started on Amiodarone gtt now in shock state requiring requiring pressors likely septic with component of vaspoplegia i/s/o sedation, possible contribution of cardiogenic from RV dysfunction   - No PE seen on invasive pulmonary angiography at time of ECMO cannulation or in recent imaging  - NO2 weaned off  (9/15) RV function remains unchanged  - s/p milrinone, off since (9/13)  - converted to sinus (9/14) discontinued amio gtt iso bradycardia   - back to AF with RVR on 9/19 when sedation weaned off c/w metoprolol for rate control and increase as needed   - overnight 9/19 had some hypotension now back on marianne but coming off (currently 0.2)  - RITCHIE 9/14 : No MEREDITH thrombus noted, negative for PFO. LVOT diameter of 2 cm  - TTE 9/12 with EF 65-70% with normal LV and RV  - TTE 9/14 with  EF 18%. Severe global LV systolic dysfunction. RV enlargement with decreased RV systolic function, however RITCHIE and recent POCUS shows normal LV systolic function, improved RV systolic function   - TTE 9/19 with recovered EF to 67% but still with RV enlargement and systolic dysfunction    GASTROINTESTINAL  Transaminase elevation likely 2/2 combination of shock liver, malposition of ECMO cannula and liver dysfunction  - ALK normal 106, AST/ALT downtrending but remain elevated 240/760, T.bili continues to rise  - Acute hepatitis panel negative  - RUQ US 9/15 shows fatty infiltration of liver, negative for hepatobiliary pathology, repeat RUQUS negative as well  - Hepatology consulted - likely shock liver with expected delay in improvement in bilirubin  - Triglycerides elevated 836, on insulin gtt for hyperglycemia, downtrending 406 > 271 >221 >173 > 154  - Episode of vomiting 9/16, tube feeds held and given reglan x48 hours, restarted TF 9/17, tolerating  - BM noted 9/17 and 9/18  - continue bowel regimen senna, miralax and movantik to avoid opioid induced constipation  - CT abdomen 9/15 w/o bowel obstruction, noted with colonic diverticulosis mostly in sigmoid, w/o evidence of diverticulitis  - continue PPI iso steroids   - nutrition following      RENAL  sCr baseline 0.78  - Creatinine wnl  - s/p diuresis with lasix, last administered 9/15 was on hemoconcentrator for fluid removal when on circuit, will assess daily if lasix is needed   - metabolic alkalosis, ?contraction, goal to keep net even given hypernatremia and elevated bicarb  -good UO with primafit   - hypernatremia improved will stop free H2O today       INFECTIOUS DISEASE  Leukopenia likely transient? vs drug induced vs infection vs malignancy?  - WBC significantly improving now 9.16  - Bactrim DS discontinued iso leukopenia, continue Mepron for PJP prophylaxis instead  - heme following- thrombocytopenia noted as well as leukopenia, likely drug induced? filgrastim started 9/17  - s/p IV Ceftriaxone 5 days? at [facility] out of an abundance of precaution to cover BAL specimen  - s/p zosyn at OSH (9/12) for lingula PNA  - vancomycin (9/12-9/15 ) d/c'd negative mrsa PCR, and azithro (9/14-9/15) d/c'd neg Urine legionella  -s/p empiric donald (9/13-9/20) now that ANC >1.5  - vanco restarted for ppx on 9/18 d/t leukopenia but stopped on 9/20  - 9/13 and 9/16 urine, sputum and blood cx ngtd  - positive COVID-19 antigen in late August  - f/u BAL, cultures, cytopathology, IL2 receptor  - ID consult for leukopenia - f/u recs      ENDOCRINE  # Hyperglycemia i/s/o steroids  Admission A1c 6.1  - had required insulin gtt but now back on NPH 11 with sliding scale   - elevated triglycerides 835, has hx of fatty liver, had beenon propofol gtt. TG downtrending since initiating insulin gtt for hyperglycemia,  - TSH low at 0.13/Free T4 slightly low 0.7, will repeat in a few days      HEME  # ECMO anticoagulation  - continue argatroban gtt goal aPTT 50-70 until negative duplex x2   -pending first VA duplex   - INR elevated likely iso argatroban and liver dysfxn  - s/p Vit. K (9/14) and FFP x1 (9/15)  - fibrinogen 250, monitor daily  - Hgb remains stable   - Heme consult      #Leukopenia  #Thrombocytopenia  - Heme consult placed for thrombocytopenia, noted to also be leukopenic  - unlikely HLH/MAS since many abnormalities can be explained by severe hypoxemia/hypotension during initial decompensation prior to ecmo cannulation but some features that are consistent and with rheumatic disease it is a possibility to f/u hematology regarding utility of further lab/pathologic testing  - peripheral blood smear reviewed: large platelets noted, no platelet clumps, no blast cells noted, neutrophils noted w/ normal number of lobes, nucleated RBC noted  - acute hepatitis panel negative  - give filgrastim 480 daily until ANC >1500 in the setting of possible infxn will stop today   -Thrombocytopenia refractory after intermittent transfusions will monitor now off circuit     #R/O DVT  - LE duplex negative for DVT   - f/u duplex- pending post decannulation     MSK  Onset of debilitating b/l hand cramps and some muscles weakness x several months, unclear etiology, radiculopathy? vs myositis?   - MRI outpatient reportedly showed cervical spine issues, started on Prednisone shortly before admission at OSH  - myomarker panel +ivon only for RNP  - seen by neurologist at [facility]   - symptoms improved with cellcept (9/8-9/11) but discontinued given Afib RVR   - f/u with Dr. Nik Marie or Dante Urbano for EMG upon discharge (osh?)      RHEUM  - started on Solumedrol 60mg q6h from 9/1 to 9/6 then weaned over time to then Medrol 32 mg 9/9 to 9/12 at [facility]  - s/p Cellcept 9/8 to 9/11 but stopped due to Afib- RVR/tachycardia   - CT findings, Improved/decreased extent of bilateral ground glass opacities and reticular markings compared to the previous study. Findings are nonspecific but differential etiologies include interstitial pneumonia, drug toxicity, nonspecific connective tissue disorders.  - OSH labs show strongly positive ARIANA, RNP, and anti smith antibodies with low C4 and normal C3. Patient with negative SSa and SSb, negative DsDNa, myomarker panel negative (except RNP)  - Rheum following  - s/p 1g solumedrol - first dose on 9/13, second dose 9/14, and third and last dose today 9/15, and then solumedrol (1mg/kg) 125mg daily, plan to change to 80mg daily today as per rheum  - s/p rituximab 9/16/23  -s/p  plasmapheresis x3 for therapeutic option to rapidly remove autoantibodies and inflammatory factors from plasma d/t severe DAH    SKIN  Reportedly had single episode of painful rash on her shins, ears and neck and chest which resolved with a steroid cream from a dermatologist prior to admission  - no evidence of rash currently  -right groin with breakdown s/p cannula placement       PROPHYLAXIS  # DVT: argatroban  # GI: TF      LINES  -Ecmo Cannulas 9/13-9/21  -LIJ TLC- 9/13  -Left Ax Traci - 9/13 (placed at OSH)  -RA 16g PIV x2- 9/15      GO  -Palliative Following  -full code  -partner Kiara at bedside daily and updated on care      63 yo F w/ Afib initially presented to urgent care for SOB where she had an XR done concerning for b/l lower infiltrates, sent to [facility] ED and admitted to  on 8/26 after being found to be hypoxemic, reported having  debilitating b/l hand numbness/tingling and some muscles weakness several months. She was found to have interstitial lung disease appearance on CT, with the plan for further ILD workup and management, started on prednisone on admission with gradually improving O2 requirements and stable on 2-3LNC. She underwent bronchoscopy with TBBX on 8/30 which showed Non-Specific Intersitial Disease (NSIP)/OP. Labs notable for positive ARIANA 1:1280, RNP > 8, Alejandro > 8. Patient continued on steroids and received cellcept, which was discontinued 2/2 Afib-RVR. Interval CTA chest on 9/11 also negative PE did show possible lingula pneumonia. Started on empiric vancomycin and zosyn 9/12, course was then c/b episode of SVT to 170s w/ rapidly progressive hypoxemic respiratory failure, placed NRB offered HFNC/BiPAP but refused, leading to worsening hypoxemic respiratory failure requiring intubation on 9/13. Despite best practices, patient remained hypoxemic and patient was cannulated for VV-ECMO on 9/13 and transferred to [facility] for further management. 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 Showed significant lung improvement and was decannulated 9/21. Extubated 9/22.     NEUROLOGY  Home meds: gabapentin 100g TID  AA&Ox3 at baseline   - following commands, WRIGHT with noted weakness  - requiring precedex  for anxiety   - s/p IV tylenol for c/o generalized pain   - can start gabapentin once more alert and awake  - CTH 9/14 no acute findings  - Palliative following  - Social Work consult  - PT/OT following    PULMONARY  Admitted to [facility] on 8/26 after p/w SOB, found to be hypoxemic, required 2-4L NC/bibap, initially responded well to IV steroids. 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 2/2 bacterial PNA vs atypical PNA vs aspiration vs AEILD continued to show signficant improvement from a lung perspective and was decannulated 9/21.    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   - follow up BAL  - continue with duonebs,  trialed IPV but vomited shortly after initiation, now discontinued  - s/p empiric abx   - s/p decannulation on 9/21 with plan for suture removal ~7-10 days       CARDIOVASCULAR  Hx of Afib w at OSH, started on Amiodarone gtt now in shock state requiring requiring pressors likely septic with component of vaspoplegia i/s/o sedation, possible contribution of cardiogenic from RV dysfunction   - No PE seen on invasive pulmonary angiography at time of ECMO cannulation or in recent imaging  - NO2 weaned off  (9/15) RV function remains unchanged  - s/p milrinone, off since (9/13)  - converted to sinus (9/14) discontinued amio gtt iso bradycardia   - back to AF with RVR on 9/19 when sedation weaned off c/w metoprolol for rate control and increase as needed   - BP labile, has received hydralazine IVP for hypertensive episodes w/ anxiety likely a contributing factor, however is requiring low dose [place] for intermittent episodes of hypotension occuring mostly while asleep.  - continue with lopressor 12.5mg PO BID for HR control/afib  - RITCHIE 9/14 : No MEREDITH thrombus noted, negative for PFO. LVOT diameter of 2 cm  - TTE 9/12 with EF 65-70% with normal LV and RV  - TTE 9/14 with  EF 18%. Severe global LV systolic dysfunction. RV enlargement with decreased RV systolic function, however RITCHIE and recent POCUS shows normal LV systolic function, improved RV systolic function   - TTE 9/19 with recovered EF to 67% but still with RV enlargement and systolic dysfunction    GASTROINTESTINAL  Transaminase elevation likely 2/2 combination of shock liver, malposition of ECMO cannula and liver dysfunction  - ALK/ AST/ALT downtrending but remain elevated 490/136/225, T.bili continues to rise 16.3 today  - Acute hepatitis panel negative  - RUQ US 9/15 shows fatty infiltration of liver, negative for hepatobiliary pathology, repeat RUQ US negative as well  - Hepatology consulted - likely shock liver with expected delay in improvement in bilirubin  - Hypertriglyceridemia 836, hx of fatty liver and propofol gtt, s/p insulin gtt, now improved ->184 today  - Episode of vomiting 9/16, tube feeds held and given reglan x48 hours, restarted TF 9/17, held for extubation   - dysphagia screen  - BM noted 9/17 and 9/18  - continue bowel regimen senna and miralax  - CT abdomen 9/15 w/o bowel obstruction, noted with colonic diverticulosis mostly in sigmoid, w/o evidence of diverticulitis  - continue PPI iso steroids   - nutrition following      RENAL  sCr baseline 0.78  - Creatinine wnl  - s/p diuresis with lasix, last administered 9/15, fluid removal via hemo concentrator while on ECMO circuit, will assess daily if lasix is needed   - s/p Bumex 1mg IVP post extubation   - good UO with primafit   - hypernatremia improved, free H2O discontinued 9/21      INFECTIOUS DISEASE  Leukocytosis, Tmax 99.6F  - Elevated WBC likely iso filgrastim (initiated 9/17 for leukopenia) as well as s/p ECMO decannulation, however would send cultures if continues to uptrend  - leukopenia now resolved s/p filgrastim (9/17-9/21)  - Bactrim DS discontinued iso leukopenia, continue Mepron for PJP prophylaxis instead  - s/p IV Ceftriaxone 5 days? at [facility] out of an abundance of precaution to cover BAL specimen  - s/p zosyn at OSH (9/12) for lingula PNA  - vancomycin (9/12-9/15 ) d/c'd negative mrsa PCR, and azithro (9/14-9/15) d/c'd neg Urine legionella  - s/p empiric dnoald (9/13-9/20) now that ANC >1.5  - vanco restarted for ppx on 9/18 d/t leukopenia but stopped on 9/20  - 9/13 and 9/16 urine, sputum and blood cx ngtd  - positive COVID-19 antigen in late August  - f/u BAL, cultures, cytopathology,   - ID following- f/u recs      ENDOCRINE  # Hyperglycemia i/s/o steroids  Admission A1c 6.1  - had required insulin gtt but now back on NPH 11 with sliding scale   - elevated triglycerides 835, has hx of fatty liver, had been on propofol gtt. TG downtrending since initiating insulin gtt for hyperglycemia,  - TSH low at 0.13/Free T4 slightly low 0.7, will repeat in a few days      HEME  # ECMO anticoagulation  - argatroban gtt goal aPTT 50-70 for RIJ deep vein thrombosis, continue until negative, duplex x2  - INR elevated likely iso argatroban and liver dysfxn, s/p Vit. K (9/14) and FFP x1 (9/15) - now resolved  - platelets x 7 for thrombocytopenia <50,000, last transfused  9/21, now improving   - fibrinogen 225,   - Hgb remains stable   - Heme following- f/u recs      #Leukopenia  #Thrombocytopenia  - Heme consult placed for thrombocytopenia, noted to also be leukopenic  - unlikely HLH/MAS since many abnormalities can be explained by severe hypoxemia/hypotension during initial decompensation prior to ecmo cannulation but some features that are consistent and with rheumatic disease it is a possibility to f/u hematology regarding utility of further lab/pathologic testing  - peripheral blood smear reviewed: large platelets noted, no platelet clumps, no blast cells noted, neutrophils noted w/ normal number of lobes, nucleated RBC noted  - acute hepatitis panel negative  - s/p filgrastim 480 daily now d/c'd given ANC >1500 in the setting of possible infxn   -Thrombocytopenia refractory after intermittent transfusions will monitor now off circuit, platelet count improving-> 75 K/ul today    #R/O DVT  - LE duplex negative for DVT   - first VA duplex post decannulation 9/21 shows nonocclusive deep vein thrombosis in the right jugular vein and the right cephalic is non-compressible  - second duplex pending       MSK  Onset of debilitating b/l hand cramps and some muscles weakness x several months, unclear etiology, radiculopathy? vs myositis?   - MRI outpatient reportedly showed cervical spine issues, started on Prednisone shortly before admission at OSH  - myomarker panel +ivon only for RNP  - seen by neurologist at [facility]   - symptoms improved with cellcept (9/8-9/11) but discontinued given Afib RVR   - consider restarting home Gabapentin to assist with neuropathic pain? once more alert and awake  - f/u with Dr. Nik Marie or Dante Urbano for EMG upon discharge (osh?)      RHEUM  - started on Solumedrol 60mg q6h from 9/1 to 9/6 then weaned over time to then Medrol 32 mg 9/9 to 9/12 at [facility]  - s/p Cellcept 9/8 to 9/11 but stopped due to Afib- RVR/tachycardia   - CT findings, Improved/decreased extent of bilateral ground glass opacities and reticular markings compared to the previous study. Findings are nonspecific but differential etiologies include interstitial pneumonia, drug toxicity, nonspecific connective tissue disorders.  - OSH labs show strongly positive ARIANA, RNP, and anti smith antibodies with low C4 and normal C3. Patient with negative SSa and SSb, negative DsDNa, myomarker panel negative (except RNP)  - IL2 receptor elevated 3119.2  - Rheum following  - s/p 1g solumedrol - first dose on 9/13, second dose 9/14, and third and last dose today 9/15, and then solumedrol (1mg/kg) 125mg daily, plan to change to 80mg daily today as per rheum  - s/p rituximab 9/16/23  -s/p  plasmapheresis x3 for therapeutic option to rapidly remove autoantibodies and inflammatory factors from plasma d/t severe DAH    SKIN  Reportedly had single episode of painful rash on her shins, ears and neck and chest which resolved with a steroid cream from a dermatologist prior to admission  - no evidence of rash currently  - right groin with breakdown s/p cannula placement        PROPHYLAXIS  # DVT: argatroban  # GI: TF      LINES  -Ecmo Cannulas 9/13-9/21  -[facility]J TLC- 9/13  -Left Ax [place] - 9/13 (placed at OSH)  -RA 16g PIV x2- 9/15      GOC  -Palliative Following  -full code  -partner Kiara at bedside daily and updated on care      61 yo F w/ Afib initially presented to urgent care for SOB where she had an XR done concerning for b/l lower infiltrates, sent to [facility] ED and admitted to  on 8/26 after being found to be hypoxemic, reported having  debilitating b/l hand numbness/tingling and some muscles weakness several months. She was found to have interstitial lung disease appearance on CT, with the plan for further ILD workup and management, started on prednisone on admission with gradually improving O2 requirements and stable on 2-3LNC. She underwent bronchoscopy with TBBX on 8/30 which showed Non-Specific Intersitial Disease (NSIP)/OP. Labs notable for positive ARIANA 1:1280, RNP > 8, Alejandro > 8. Patient continued on steroids and received cellcept, which was discontinued 2/2 Afib-RVR. Interval CTA chest on 9/11 also negative PE did show possible lingula pneumonia. Started on empiric vancomycin and zosyn 9/12, course was then c/b episode of SVT to 170s w/ rapidly progressive hypoxemic respiratory failure, placed NRB offered HFNC/BiPAP but refused, leading to worsening hypoxemic respiratory failure requiring intubation on 9/13. Despite best practices, patient remained hypoxemic and patient was cannulated for VV-ECMO on 9/13 and transferred to [facility] for further management. 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 Showed significant lung improvement and was decannulated 9/21. Extubated 9/22.     NEUROLOGY  Home meds: gabapentin 100g TID  AA&Ox3 at baseline   - following commands w/ delirium likely iso prolonged hospitalization, sedation, and ICU setting  - requiring precedex  for anxiety, will start to wean off as tolerated   - s/p IV tylenol for c/o generalized pain   - can start gabapentin once more alert and awake  - CT 9/14 no acute findings  - Palliative following  - Social Work consult  - PT/OT following    PULMONARY  Admitted to [facility] on 8/26 after p/w SOB, found to be hypoxemic, required 2-4L NC/bibap, initially responded well to IV steroids. 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 2/2 bacterial PNA vs atypical PNA vs aspiration vs AEILD continued to show signficant improvement from a lung perspective and was decannulated 9/21.    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   - follow up BAL  - Tolerating PS 10/8 pulling TV ~ 300-400ml, Met criteria for extubation. Extubated to HFNC 100%, will wean as tolerated, bipap at night   - continue with duonebs,  trialed IPV but vomited shortly after initiation, now discontinued  - s/p empiric abx   - s/p decannulation on 9/21 with plan for suture removal ~7-10 days       CARDIOVASCULAR  Hx of Afib w at OSH, started on Amiodarone gtt now in shock state requiring requiring pressors likely septic with component of vaspoplegia i/s/o sedation, possible contribution of cardiogenic from RV dysfunction   - No PE seen on invasive pulmonary angiography at time of ECMO cannulation or in recent imaging  - NO2 weaned off  (9/15) RV function remains unchanged  - s/p milrinone, off since (9/13)  - converted to sinus (9/14) discontinued amio gtt iso bradycardia   - back to AF with RVR on 9/19 when sedation weaned off c/w metoprolol for rate control and increase as needed   - BP labile, has received hydralazine IVP for hypertensive episodes w/ anxiety likely a contributing factor, however is requiring low dose Roosevelt for intermittent episodes of hypotension occuring mostly while asleep.  - continue with lopressor 12.5mg PO BID for HR control/afib  - RITCHIE 9/14 : No MEREDITH thrombus noted, negative for PFO. LVOT diameter of 2 cm  - TTE 9/12 with EF 65-70% with normal LV and RV  - TTE 9/14 with  EF 18%. Severe global LV systolic dysfunction. RV enlargement with decreased RV systolic function, however RITCHIE and recent POCUS shows normal LV systolic function, improved RV systolic function   - TTE 9/19 with recovered EF to 67% but still with RV enlargement and systolic dysfunction    GASTROINTESTINAL  Transaminase elevation likely 2/2 combination of shock liver, malposition of ECMO cannula and liver dysfunction  - ALK/ AST/ALT downtrending but remain elevated 490/136/225, T.bili continues to rise 16.3 today  - Acute hepatitis panel negative  - RUQ US 9/15 shows fatty infiltration of liver, negative for hepatobiliary pathology, repeat RUQ US negative as well  - Hepatology consulted - likely shock liver with expected delay in improvement in bilirubin  - Hypertriglyceridemia 836, hx of fatty liver and propofol gtt, s/p insulin gtt, now improved ->184 today  - Episode of vomiting 9/16, tube feeds held and given reglan x48 hours, restarted TF 9/17, held for extubation   - dysphagia screen  - BM noted 9/17 and 9/18  - continue bowel regimen senna and miralax  - CT abdomen 9/15 w/o bowel obstruction, noted with colonic diverticulosis mostly in sigmoid, w/o evidence of diverticulitis  - continue PPI iso steroids   - nutrition following      RENAL  sCr baseline 0.78  - Creatinine wnl  - s/p diuresis with lasix, last administered 9/15, fluid removal via hemo concentrator while on ECMO circuit, will assess daily if lasix is needed   - s/p Bumex 1mg IVP post extubation   - good UO with primafit   - hypernatremia improved, free H2O discontinued 9/21      INFECTIOUS DISEASE  Leukocytosis, Tmax 99.6F  - Elevated WBC likely iso filgrastim (initiated 9/17 for leukopenia) as well as s/p ECMO decannulation, however would send cultures if continues to uptrend  - leukopenia now resolved s/p filgrastim (9/17-9/21)  - Bactrim DS discontinued iso leukopenia, continue Mepron for PJP prophylaxis instead  - s/p IV Ceftriaxone 5 days? at [facility] out of an abundance of precaution to cover BAL specimen  - s/p zosyn at OSH (9/12) for lingula PNA  - vancomycin (9/12-9/15 ) d/c'd negative mrsa PCR, and azithro (9/14-9/15) d/c'd neg Urine legionella  - s/p empiric donald (9/13-9/20) now that ANC >1.5  - vanco restarted for ppx on 9/18 d/t leukopenia but stopped on 9/20  - 9/13 and 9/16 urine, sputum and blood cx ngtd  - positive COVID-19 antigen in late August  - f/u BAL, cultures, cytopathology,   - ID following- f/u recs      ENDOCRINE  # Hyperglycemia i/s/o steroids  Admission A1c 6.1  - had required insulin gtt but now back on NPH 11 with sliding scale   - elevated triglycerides 835, has hx of fatty liver, had been on propofol gtt. TG downtrending since initiating insulin gtt for hyperglycemia,  - TSH low at 0.13/Free T4 slightly low 0.7, will repeat in a few days      HEME  # ECMO anticoagulation  - argatroban gtt goal aPTT 50-70 for RIJ deep vein thrombosis, continue until negative, duplex x2  - INR elevated likely iso argatroban and liver dysfxn, s/p Vit. K (9/14) and FFP x1 (9/15) - now resolved  - platelets x 7 for thrombocytopenia <50,000, last transfused  9/21, now improving   - fibrinogen 225,   - Hgb remains stable   - Heme following- f/u recs      #Leukopenia  #Thrombocytopenia  - Heme consult placed for thrombocytopenia, noted to also be leukopenic  - unlikely HLH/MAS since many abnormalities can be explained by severe hypoxemia/hypotension during initial decompensation prior to ecmo cannulation but some features that are consistent and with rheumatic disease it is a possibility to f/u hematology regarding utility of further lab/pathologic testing  - peripheral blood smear reviewed: large platelets noted, no platelet clumps, no blast cells noted, neutrophils noted w/ normal number of lobes, nucleated RBC noted  - acute hepatitis panel negative  - s/p filgrastim 480 daily now d/c'd given ANC >1500 in the setting of possible infxn   -T hrombocytopenia refractory after intermittent transfusions will monitor now off circuit, platelet count improving-> 75 K/ul today    #R/O DVT  - LE duplex negative for DVT   - first VA duplex post decannulation 9/21 shows nonocclusive deep vein thrombosis in the right jugular vein and the right cephalic is non-compressible  - second duplex pending       MSK  Onset of debilitating b/l hand cramps and some muscles weakness x several months, unclear etiology, radiculopathy? vs myositis?   - MRI outpatient reportedly showed cervical spine issues, started on Prednisone shortly before admission at OSH  - myomarker panel +ivon only for RNP  - seen by neurologist at [facility]   - symptoms improved with cellcept (9/8-9/11) but discontinued given Afib RVR   - consider restarting home Gabapentin to assist with neuropathic pain? once more alert and awake  - f/u with Dr. Nik Marie or Dante Urbano for EMG upon discharge (osh?)      RHEUM  - started on Solumedrol 60mg q6h from 9/1 to 9/6 then weaned over time to then Medrol 32 mg 9/9 to 9/12 at [facility]  - s/p Cellcept 9/8 to 9/11 but stopped due to Afib- RVR/tachycardia   - CT findings, Improved/decreased extent of bilateral ground glass opacities and reticular markings compared to the previous study. Findings are nonspecific but differential etiologies include interstitial pneumonia, drug toxicity, nonspecific connective tissue disorders.  - OSH labs show strongly positive ARIANA, RNP, and anti smith antibodies with low C4 and normal C3. 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      SKIN  Reportedly had single episode of painful rash on her shins, ears and neck and chest which resolved with a steroid cream from a dermatologist prior to admission  - no evidence of rash currently  - right groin with breakdown s/p cannula placement        PROPHYLAXIS  # DVT: argatroban  # GI: TF      LINES  -Ecmo Cannulas 9/13-9/21  -LIJ TLC- 9/13  -Left Ax Traci - 9/13 (placed at OSH)  -RA 16g PIV x2- 9/15      GOC  -Palliative Following  -full code  -partner Kiara at bedside daily and updated on care      63 yo F w/ Afib initially presented to urgent care for SOB where she had an XR done concerning for b/l lower infiltrates, sent to [facility] ED and admitted to  on 8/26 after being found to be hypoxemic, reported having  debilitating b/l hand numbness/tingling and some muscles weakness several months. She was found to have interstitial lung disease appearance on CT, with the plan for further ILD workup and management, started on prednisone on admission with gradually improving O2 requirements and stable on 2-3LNC. She underwent bronchoscopy with TBBX on 8/30 which showed Non-Specific Intersitial Disease (NSIP)/OP. Labs notable for positive ARIANA 1:1280, RNP > 8, Alejandro > 8. Patient continued on steroids and received cellcept, which was discontinued 2/2 Afib-RVR. Interval CTA chest on 9/11 also negative PE did show possible lingula pneumonia. Started on empiric vancomycin and zosyn 9/12, course was then c/b episode of SVT to 170s w/ rapidly progressive hypoxemic respiratory failure, placed NRB offered HFNC/BiPAP but refused, leading to worsening hypoxemic respiratory failure requiring intubation on 9/13. Despite best practices, patient remained hypoxemic and patient was cannulated for VV-ECMO on 9/13 and transferred to [facility] for further management. 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 Showed significant lung improvement and was decannulated 9/21. Extubated 9/22.     NEUROLOGY  Home meds: gabapentin 100g TID  AA&Ox3 at baseline   - following commands w/ delirium likely iso prolonged hospitalization, sedation, and ICU setting  - requiring precedex  for anxiety, will start to slowly wean off as tolerated   - can give seroquel 25mg QHS to assist with anxiety  - s/p IV tylenol for c/o generalized pain   - can start gabapentin once more alert and awake  - CTH 9/14 no acute findings  - Palliative following  - Social Work consult  - PT/OT following    PULMONARY  Admitted to [facility] on 8/26 after p/w SOB, found to be hypoxemic, required 2-4L NC/bibap, initially responded well to IV steroids. 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 2/2 bacterial PNA vs atypical PNA vs aspiration vs AEILD continued to show signficant improvement from a lung perspective and was decannulated 9/21.    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   - follow up BAL  - Tolerating PS 10/8 pulling TV ~ 300-400ml, Met criteria for extubation. Extubated to HFNC 100%, will wean as tolerated, bipap at night   - post extubation ABG   - continue with duonebs,  trialed IPV but vomited shortly after initiation, now discontinued  - s/p empiric abx   - s/p decannulation on 9/21 with plan for suture removal ~7-10 days       CARDIOVASCULAR  Hx of Afib w at OSH, started on Amiodarone gtt now in shock state requiring requiring pressors likely septic with component of vaspoplegia i/s/o sedation, possible contribution of cardiogenic from RV dysfunction   - No PE seen on invasive pulmonary angiography at time of ECMO cannulation or in recent imaging  - NO2 weaned off  (9/15) RV function remains unchanged  - s/p milrinone, off since (9/13)  - converted to sinus (9/14) discontinued amio gtt iso bradycardia   - back to AF with RVR on 9/19 when sedation weaned off c/w metoprolol for rate control and increase as needed   - BP labile, has received hydralazine IVP for hypertensive episodes w/ anxiety likely a contributing factor, however is requiring low dose Roosevelt for intermittent episodes of hypotension occuring mostly while asleep.  - continue with lopressor 12.5mg PO BID for HR control/afib  - RITCHIE 9/14 : No MEREDITH thrombus noted, negative for PFO. LVOT diameter of 2 cm  - TTE 9/12 with EF 65-70% with normal LV and RV  - TTE 9/14 with  EF 18%. Severe global LV systolic dysfunction. RV enlargement with decreased RV systolic function, however RITCHIE and recent POCUS shows normal LV systolic function, improved RV systolic function   - TTE 9/19 with recovered EF to 67% but still with RV enlargement and systolic dysfunction    GASTROINTESTINAL  Transaminase elevation likely 2/2 combination of shock liver, malposition of ECMO cannula and liver dysfunction  - ALK/ AST/ALT downtrending but remain elevated 490/136/225, T.bili continues to rise 16.3 today  - Acute hepatitis panel negative  - RUQ US 9/15 shows fatty infiltration of liver, negative for hepatobiliary pathology, repeat RUQ US negative as well  - Hepatology consulted - likely shock liver with expected delay in improvement in bilirubin  - Hypertriglyceridemia 836, hx of fatty liver and propofol gtt, s/p insulin gtt, now improved ->184 today  - Episode of vomiting 9/16, tube feeds held and given reglan x48 hours, restarted TF 9/17, held for extubation   - dysphagia screen  - BM noted 9/17 and 9/18  - continue bowel regimen senna and miralax  - CT abdomen 9/15 w/o bowel obstruction, noted with colonic diverticulosis mostly in sigmoid, w/o evidence of diverticulitis  - continue PPI iso steroids   - nutrition following      RENAL  sCr baseline 0.78  - Creatinine wnl  - s/p diuresis with lasix, last administered 9/15, fluid removal via hemo concentrator while on ECMO circuit, will assess daily if lasix is needed   - s/p Bumex 1mg IVP post extubation   - good UO with primafit   - hypernatremia improved, free H2O discontinued 9/21      INFECTIOUS DISEASE  Leukocytosis, Tmax 99.6F  - Elevated WBC likely iso filgrastim (initiated 9/17 for leukopenia) as well as s/p ECMO decannulation, however would send cultures if continues to uptrend  - leukopenia now resolved s/p filgrastim (9/17-9/21)  - Bactrim DS discontinued iso leukopenia, continue Mepron for PJP prophylaxis instead  - s/p IV Ceftriaxone 5 days? at [facility] out of an abundance of precaution to cover BAL specimen  - s/p zosyn at OSH (9/12) for lingula PNA  - vancomycin (9/12-9/15 ) d/c'd negative mrsa PCR, and azithro (9/14-9/15) d/c'd neg Urine legionella  - s/p empiric donald (9/13-9/20) now that ANC >1.5  - vanco restarted for ppx on 9/18 d/t leukopenia but stopped on 9/20  - 9/13 and 9/16 urine, sputum and blood cx ngtd  - positive COVID-19 antigen in late August  - f/u BAL, cultures, cytopathology,   - ID following- f/u recs      ENDOCRINE  # Hyperglycemia i/s/o steroids  Admission A1c 6.1  - had required insulin gtt but now back on NPH 11 with sliding scale   - elevated triglycerides 835, has hx of fatty liver, had been on propofol gtt. TG downtrending since initiating insulin gtt for hyperglycemia,  - TSH low at 0.13/Free T4 slightly low 0.7, will repeat in a few days      HEME  # ECMO anticoagulation  - argatroban gtt goal aPTT 50-70 for RIJ deep vein thrombosis, continue until negative, duplex x2  - INR elevated likely iso argatroban and liver dysfxn, s/p Vit. K (9/14) and FFP x1 (9/15) - now resolved  - platelets x 7 for thrombocytopenia <50,000, last transfused  9/21, now improving   - fibrinogen 225,   - Hgb remains stable   - Heme following- f/u recs      #Leukopenia  #Thrombocytopenia  - Heme consult placed for thrombocytopenia, noted to also be leukopenic  - unlikely HLH/MAS since many abnormalities can be explained by severe hypoxemia/hypotension during initial decompensation prior to ecmo cannulation but some features that are consistent and with rheumatic disease it is a possibility to f/u hematology regarding utility of further lab/pathologic testing  - peripheral blood smear reviewed: large platelets noted, no platelet clumps, no blast cells noted, neutrophils noted w/ normal number of lobes, nucleated RBC noted  - acute hepatitis panel negative  - s/p filgrastim 480 daily now d/c'd given ANC >1500 in the setting of possible infxn   -T hrombocytopenia refractory after intermittent transfusions will monitor now off circuit, platelet count improving-> 75 K/ul today    #R/O DVT  - LE duplex negative for DVT   - first VA duplex post decannulation 9/21 shows nonocclusive deep vein thrombosis in the right jugular vein and the right cephalic is non-compressible  - second duplex pending       MSK  Onset of debilitating b/l hand cramps and some muscles weakness x several months, unclear etiology, radiculopathy? vs myositis?   - MRI outpatient reportedly showed cervical spine issues, started on Prednisone shortly before admission at OSH  - myomarker panel +ivon only for RNP  - seen by neurologist at [facility]   - symptoms improved with cellcept (9/8-9/11) but discontinued given Afib RVR   - consider restarting home Gabapentin to assist with neuropathic pain? once more alert and awake  - f/u with Dr. Nik Marie or Dante Urbano for EMG upon discharge (osh?)      RHEUM  - started on Solumedrol 60mg q6h from 9/1 to 9/6 then weaned over time to then Medrol 32 mg 9/9 to 9/12 at [facility]  - s/p Cellcept 9/8 to 9/11 but stopped due to Afib- RVR/tachycardia   - CT findings, Improved/decreased extent of bilateral ground glass opacities and reticular markings compared to the previous study. Findings are nonspecific but differential etiologies include interstitial pneumonia, drug toxicity, nonspecific connective tissue disorders.  - OSH labs show strongly positive ARIANA, RNP, and anti smith antibodies with low C4 and normal C3. 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      SKIN  Reportedly had single episode of painful rash on her shins, ears and neck and chest which resolved with a steroid cream from a dermatologist prior to admission  - no evidence of rash currently  - right groin with breakdown s/p cannula placement        PROPHYLAXIS  # DVT: argatroban  # GI: TF      LINES  -Ecmo Cannulas 9/13-9/21  -[facility] TLC- 9/13  -Left Ax Traci - 9/13 (placed at OSH)  -RA 16g PIV x2- 9/15      GOC  -Palliative Following  -full code  -partner Kiara at bedside daily and updated on care      63 yo F w/ Afib initially presented to urgent care for SOB where she had an XR done concerning for b/l lower infiltrates, sent to [facility] ED and admitted to  on 8/26 after being found to be hypoxemic, reported having  debilitating b/l hand numbness/tingling and some muscles weakness several months. She was found to have interstitial lung disease appearance on CT, with the plan for further ILD workup and management, started on prednisone on admission with gradually improving O2 requirements and stable on 2-3LNC. She underwent bronchoscopy with TBBX on 8/30 which showed Non-Specific Intersitial Disease (NSIP)/OP. Labs notable for positive ARIANA 1:1280, RNP > 8, Alejandro > 8. Patient continued on steroids and received cellcept, which was discontinued 2/2 Afib-RVR. Interval CTA chest on 9/11 also negative PE did show possible lingula pneumonia. Started on empiric vancomycin and zosyn 9/12, course was then c/b episode of SVT to 170s w/ rapidly progressive hypoxemic respiratory failure, placed NRB offered HFNC/BiPAP but refused, leading to worsening hypoxemic respiratory failure requiring intubation on 9/13. Despite best practices, patient remained hypoxemic and patient was cannulated for VV-ECMO on 9/13 and transferred to [facility] for further management. 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 Showed significant lung improvement and was decannulated 9/21. Extubated 9/22.     NEUROLOGY  Home meds: gabapentin 100g TID  AA&Ox3 at baseline   - following commands w/ delirium likely iso prolonged hospitalization, sedation, and ICU setting  - requiring precedex  for anxiety, will start to slowly wean off as tolerated   - can give seroquel 25mg QHS to assist with anxiety  - s/p IV tylenol for c/o generalized pain and morphine low dose given elevated LFT's  - CTH 9/14 no acute findings  - Palliative following  - Social Work consult  - PT/OT following    PULMONARY  Admitted to [facility] on 8/26 after p/w SOB, found to be hypoxemic, required 2-4L NC/bibap, initially responded well to IV steroids. 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 2/2 bacterial PNA vs atypical PNA vs aspiration vs AEILD continued to show signficant improvement from a lung perspective and was decannulated 9/21.    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   - follow up BAL  - Tolerating PS 10/8 pulling TV ~ 300-400ml, Met criteria for extubation. Extubated to HFNC 60L/100%, will wean as tolerated  - post extubation ABG   - continue with duonebs, trialed IPV but vomited shortly after initiation, now discontinued  - s/p empiric abx   - s/p decannulation on 9/21 with plan for suture removal ~7-10 days       CARDIOVASCULAR  Hx of Afib w at OSH, started on Amiodarone gtt now in shock state requiring requiring pressors likely septic with component of vaspoplegia i/s/o sedation, possible contribution of cardiogenic from RV dysfunction   - No PE seen on invasive pulmonary angiography at time of ECMO cannulation or in recent imaging  - NO2 weaned off  (9/15) RV function remains unchanged  - s/p milrinone, off since (9/13)  - converted to sinus (9/14) discontinued amio gtt iso bradycardia   - back to AF with RVR on 9/19 when sedation weaned off c/w metoprolol for rate control and increase as needed   - BP labile, has received hydralazine IVP for hypertensive episodes w/ anxiety likely a contributing factor, however is requiring low dose Roosevelt for intermittent episodes of hypotension occuring mostly while asleep.  - continue with lopressor 12.5mg PO BID for HR control/afib  - RITCHIE 9/14 : No MEREDITH thrombus noted, negative for PFO. LVOT diameter of 2 cm  - TTE 9/12 with EF 65-70% with normal LV and RV  - TTE 9/14 with  EF 18%. Severe global LV systolic dysfunction. RV enlargement with decreased RV systolic function, however RITCHIE and recent POCUS shows normal LV systolic function, improved RV systolic function   - TTE 9/19 with recovered EF to 67% but still with RV enlargement and systolic dysfunction    GASTROINTESTINAL  Transaminase elevation likely 2/2 combination of shock liver, malposition of ECMO cannula and liver dysfunction  - ALK/ AST/ALT downtrending but remain elevated 490/136/225, T.bili continues to rise 16.3 today  - Acute hepatitis panel negative  - RUQ US 9/15 shows fatty infiltration of liver, negative for hepatobiliary pathology, repeat RUQ US negative as well  - Hepatology consulted - likely shock liver with expected delay in improvement in bilirubin  - Hypertriglyceridemia 836, hx of fatty liver and propofol gtt, s/p insulin gtt, now improved ->184 today  - Episode of vomiting 9/16, tube feeds held and given reglan x48 hours, restarted TF 9/17, held for extubation   - failed dysphagia screen, official swallow eval placed   - BM noted 9/17 and 9/18  - continue bowel regimen senna and miralax  - CT abdomen 9/15 w/o bowel obstruction, noted with colonic diverticulosis mostly in sigmoid, w/o evidence of diverticulitis  - continue PPI iso steroids   - nutrition following      RENAL  sCr baseline 0.78  - Creatinine wnl  - s/p diuresis with lasix, last administered 9/15, fluid removal via hemo concentrator while on ECMO circuit, will assess daily if lasix is needed   - s/p Bumex 1mg IVP post extubation   - good UO with primafit   - hypernatremia improved, free H2O discontinued 9/21      INFECTIOUS DISEASE  Leukocytosis, Tmax 99.6F  - Elevated WBC likely iso filgrastim (initiated 9/17 for leukopenia) as well as s/p ECMO decannulation, however would send cultures if continues to uptrend  - leukopenia now resolved s/p filgrastim (9/17-9/21)  - Bactrim DS discontinued iso leukopenia, continue Mepron for PJP prophylaxis instead  - s/p IV Ceftriaxone 5 days? at [facility] out of an abundance of precaution to cover BAL specimen  - s/p zosyn at OSH (9/12) for lingula PNA  - vancomycin (9/12-9/15 ) d/c'd negative mrsa PCR, and azithro (9/14-9/15) d/c'd neg Urine legionella  - s/p empiric donald (9/13-9/20) now that ANC >1.5  - vanco restarted for ppx on 9/18 d/t leukopenia but stopped on 9/20  - 9/13 and 9/16 urine, sputum and blood cx ngtd  - positive COVID-19 antigen in late August  - f/u BAL, cultures, cytopathology,   - ID following- f/u recs      ENDOCRINE  # Hyperglycemia i/s/o steroids  Admission A1c 6.1  - had required insulin gtt but now back on NPH 11 with sliding scale   - elevated triglycerides 835, has hx of fatty liver, had been on propofol gtt. TG downtrending since initiating insulin gtt for hyperglycemia,  - TSH low at 0.13/Free T4 slightly low 0.7, will repeat in a few days      HEME  # ECMO anticoagulation  - argatroban gtt goal aPTT 50-70 for RIJ deep vein thrombosis, continue until negative, duplex x2  - INR elevated likely iso argatroban and liver dysfxn, s/p Vit. K (9/14) and FFP x1 (9/15) - now resolved  - platelets x 7 for thrombocytopenia <50,000, last transfused  9/21, now improving   - fibrinogen 225,   - Hgb remains stable   - Heme following- f/u recs      #Leukopenia  #Thrombocytopenia  - Heme consult placed for thrombocytopenia, noted to also be leukopenic  - unlikely HLH/MAS since many abnormalities can be explained by severe hypoxemia/hypotension during initial decompensation prior to ecmo cannulation but some features that are consistent and with rheumatic disease it is a possibility to f/u hematology regarding utility of further lab/pathologic testing  - peripheral blood smear reviewed: large platelets noted, no platelet clumps, no blast cells noted, neutrophils noted w/ normal number of lobes, nucleated RBC noted  - acute hepatitis panel negative  - s/p filgrastim 480 daily now d/c'd given ANC >1500 in the setting of possible infxn   -T hrombocytopenia refractory after intermittent transfusions will monitor now off circuit, platelet count improving-> 75 K/ul today    #R/O DVT  - LE duplex negative for DVT   - first VA duplex post decannulation 9/21 shows nonocclusive deep vein thrombosis in the right jugular vein and the right cephalic is non-compressible  - second duplex pending       MSK  Onset of debilitating b/l hand cramps and some muscles weakness x several months, unclear etiology, radiculopathy? vs myositis?   - MRI outpatient reportedly showed cervical spine issues, started on Prednisone shortly before admission at OSH  - myomarker panel +ivon only for RNP  - seen by neurologist at [facility]   - symptoms improved with cellcept (9/8-9/11) but discontinued given Afib RVR   - consider restarting home Gabapentin to assist with neuropathic pain? once more alert and awake  - f/u with Dr. Nik Marie or Dante Urbano for EMG upon discharge (osh?)      RHEUM  - started on Solumedrol 60mg q6h from 9/1 to 9/6 then weaned over time to then Medrol 32 mg 9/9 to 9/12 at H  - s/p Cellcept 9/8 to 9/11 but stopped due to Afib- RVR/tachycardia   - CT findings, Improved/decreased extent of bilateral ground glass opacities and reticular markings compared to the previous study. Findings are nonspecific but differential etiologies include interstitial pneumonia, drug toxicity, nonspecific connective tissue disorders.  - OSH labs show strongly positive ARIANA, RNP, and anti smith antibodies with low C4 and normal C3. 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      SKIN  Reportedly had single episode of painful rash on her shins, ears and neck and chest which resolved with a steroid cream from a dermatologist prior to admission  - no evidence of rash currently  - right groin with breakdown s/p cannula placement        PROPHYLAXIS  # DVT: argatroban  # GI: TF      LINES  -Ecmo Cannulas 9/13-9/21  -LIJ TLC- 9/13  -Left Ax Traci - 9/13 (placed at OSH)  -RA 16g PIV x2- 9/15      GOC  -Palliative Following  -full code  -partner Kiara at bedside daily and updated on care      61 yo F w/ Afib initially presented to urgent care for SOB where she had an XR done concerning for b/l lower infiltrates, sent to [facility] ED and admitted to  on 8/26 after being found to be hypoxemic, reported having  debilitating b/l hand numbness/tingling and some muscles weakness several months. She was found to have interstitial lung disease appearance on CT, with the plan for further ILD workup and management, started on prednisone on admission with gradually improving O2 requirements and stable on 2-3LNC. She underwent bronchoscopy with TBBX on 8/30 which showed Non-Specific Intersitial Disease (NSIP)/OP. Labs notable for positive ARIANA 1:1280, RNP > 8, Alejandro > 8. Patient continued on steroids and received cellcept, which was discontinued 2/2 Afib-RVR. Interval CTA chest on 9/11 also negative PE did show possible lingula pneumonia. Started on empiric vancomycin and zosyn 9/12, course was then c/b episode of SVT to 170s w/ rapidly progressive hypoxemic respiratory failure, placed NRB offered HFNC/BiPAP but refused, leading to worsening hypoxemic respiratory failure requiring intubation on 9/13. Despite best practices, patient remained hypoxemic and patient was cannulated for VV-ECMO on 9/13 and transferred to [facility] for further management. 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 Showed significant lung improvement and was decannulated 9/21. Extubated 9/22.     NEUROLOGY  Home meds: gabapentin 100g TID  AA&Ox3 at baseline   - following commands w/ delirium likely iso prolonged hospitalization, sedation, and ICU setting  - requiring precedex  for anxiety, will start to slowly wean off as tolerated   - can give seroquel 25mg QHS to assist with anxiety  - s/p IV tylenol for c/o generalized pain and low dose morphine given iso elevated LFT's  - CTH 9/14 no acute findings  - Palliative following  - Social Work consult  - PT/OT following    PULMONARY  Admitted to [facility] on 8/26 after p/w SOB, found to be hypoxemic, required 2-4L NC/bibap, initially responded well to IV steroids. 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 2/2 bacterial PNA vs atypical PNA vs aspiration vs AEILD continued to show significant improvement from a lung perspective and was decannulated 9/21, and extubated to HFNC 9/22.  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   - follow up BAL  - Tolerating PS 10/8 pulling TV ~ 300-400ml. Met criteria for extubation. Extubated to HFNC 60L/100%, wean as tolerated  - post extubation ABG : 7.55/35/116/31/98% on HFNC 60%   - continue with duonebs, trialed IPV but vomited shortly after initiation, now discontinued  - s/p empiric abx   - s/p decannulation on 9/21 with plan for suture removal ~7-10 days       CARDIOVASCULAR  Hx of Afib w at OSH, started on Amiodarone gtt now in shock state requiring requiring pressors likely septic with component of vaspoplegia i/s/o sedation, possible contribution of cardiogenic from RV dysfunction   - No PE seen on invasive pulmonary angiography at time of ECMO cannulation or in recent imaging  - NO2 weaned off  (9/15) RV function remains unchanged  - s/p milrinone, off since (9/13)  - converted to sinus (9/14) discontinued amio gtt iso bradycardia   - back to AF with RVR on 9/19 when sedation weaned off c/w metoprolol 12.5mg BID for rate control and increase as needed   - BP labile, has received hydralazine IVP for hypertensive episodes w/ anxiety likely a contributing factor, however is also requiring low dose Roosevelt for intermittent episodes of hypotension that occur mostly while asleep.  - RITCHIE 9/14 : No MEREDITH thrombus noted, negative for PFO. LVOT diameter of 2 cm  - TTE 9/12 with EF 65-70% with normal LV and RV  - TTE 9/14 with  EF 18%. Severe global LV systolic dysfunction. RV enlargement with decreased RV systolic function, however RITCHIE and recent POCUS shows normal LV systolic function, improved RV systolic function   - TTE 9/19 with recovered EF to 67% but still with RV enlargement and systolic dysfunction    GASTROINTESTINAL  Transaminase elevation likely 2/2 combination of shock liver, malposition of ECMO cannula and liver dysfunction  - ALK/ AST/ALT downtrending but remain elevated 490/136/225, T.bili continues to rise 16.3 today  - Acute hepatitis panel negative  - RUQ US 9/15 shows fatty infiltration of liver, negative for hepatobiliary pathology, repeat RUQ US negative as well  - Hepatology consulted - likely shock liver with expected delay in improvement in bilirubin  - Hypertriglyceridemia 836, hx of fatty liver and propofol gtt, s/p insulin gtt, now improved ->184 today  - Episode of vomiting 9/16, tube feeds held and given reglan x48 hours, restarted TF 9/17, and today held for extubation   - failed dysphagia screen, keep NPO pending official swallow eval placed   - Last BM noted 9/20  - continue bowel regimen senna and miralax  - CT abdomen 9/15 w/o bowel obstruction, noted with colonic diverticulosis mostly in sigmoid, w/o evidence of diverticulitis  - continue PPI iso steroids   - nutrition following      RENAL  sCr baseline 0.78  - Creatinine wnl  - s/p diuresis with lasix, last administered 9/15, fluid removal via hemo concentrator while on ECMO circuit, will assess daily if lasix is needed   - s/p Bumex 1mg IVP post extubation   - good UO with primafit   - hypernatremia improved, free H2O discontinued 9/21      INFECTIOUS DISEASE  Leukocytosis, Tmax 99.6F  - Elevated WBC likely iso filgrastim (initiated 9/17 for leukopenia) as well as s/p ECMO decannulation, however would send cultures if continues to uptrend  - leukopenia now resolved s/p filgrastim (9/17-9/21)  - Bactrim DS discontinued iso leukopenia, continue Mepron for PJP prophylaxis instead  - s/p IV Ceftriaxone 5 days? at [facility] out of an abundance of precaution to cover BAL specimen  - s/p zosyn at OSH (9/12) for lingula PNA  - vancomycin (9/12-9/15 ) d/c'd negative mrsa PCR, and azithro (9/14-9/15) d/c'd neg Urine legionella  - s/p empiric donald (9/13-9/20) now that ANC >1.5  - vanco restarted for ppx on 9/18 d/t leukopenia but stopped on 9/20  - 9/13 and 9/16 urine, sputum and blood cx ngtd  - positive COVID-19 antigen in late August  - f/u BAL, cultures, cytopathology,   - ID following- f/u recs      ENDOCRINE  # Hyperglycemia i/s/o steroids  Admission A1c 6.1  - had required insulin gtt but now back on NPH 11 with sliding scale   - elevated triglycerides 835, has hx of fatty liver, had been on propofol gtt. TG downtrending since initiating insulin gtt for hyperglycemia,  - TSH low at 0.13/Free T4 slightly low 0.7, will repeat in a few days      HEME  # ECMO anticoagulation  - argatroban gtt goal aPTT 50-70 for RIJ deep vein thrombosis, continue until negative, duplex x2  - INR elevated likely iso argatroban and liver dysfxn, s/p Vit. K (9/14) and FFP x1 (9/15) - now resolved  - platelets x 7 for thrombocytopenia <50,000, last transfused  9/21, now improving   - fibrinogen 225,   - Hgb remains stable   - Heme following- f/u recs      #Leukopenia  #Thrombocytopenia  - Heme consult placed for thrombocytopenia, noted to also be leukopenic  - unlikely HLH/MAS since many abnormalities can be explained by severe hypoxemia/hypotension during initial decompensation prior to ecmo cannulation but some features that are consistent and with rheumatic disease it is a possibility to f/u hematology regarding utility of further lab/pathologic testing  - peripheral blood smear reviewed: large platelets noted, no platelet clumps, no blast cells noted, neutrophils noted w/ normal number of lobes, nucleated RBC noted  - acute hepatitis panel negative  - s/p filgrastim 480 daily now d/c'd given ANC >1500 in the setting of possible infxn   - Thrombocytopenia refractory after intermittent transfusions will monitor now off circuit, platelet count improving-> 75 K/ul today    #R/O DVT  - LE duplex negative for DVT   - first VA duplex post decannulation 9/21 shows nonocclusive deep vein thrombosis in the right jugular vein and the right cephalic is non-compressible  - second duplex pending       MSK  Onset of debilitating b/l hand cramps and some muscles weakness x several months, unclear etiology, radiculopathy? vs myositis?   - MRI outpatient reportedly showed cervical spine issues, started on Prednisone shortly before admission at OSH  - myomarker panel +ivon only for RNP  - seen by neurologist at [facility]   - symptoms improved with cellcept (9/8-9/11) but discontinued given Afib RVR   - consider restarting home Gabapentin to assist with neuropathic pain? once more alert and awake  - f/u with Dr. Nik Marie or Dante Urbano for EMG upon discharge (osh?)      RHEUM  - started on Solumedrol 60mg q6h from 9/1 to 9/6 then weaned over time to then Medrol 32 mg 9/9 to 9/12 at [facility]  - s/p Cellcept 9/8 to 9/11 but stopped due to Afib- RVR/tachycardia   - CT findings, Improved/decreased extent of bilateral ground glass opacities and reticular markings compared to the previous study. Findings are nonspecific but differential etiologies include interstitial pneumonia, drug toxicity, nonspecific connective tissue disorders.  - OSH labs show strongly positive ARIANA, RNP, and anti smith antibodies with low C4 and normal C3. 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      SKIN  Reportedly had single episode of painful rash on her shins, ears and neck and chest which resolved with a steroid cream from a dermatologist prior to admission  - no evidence of rash currently  - right groin with breakdown s/p cannula placement        PROPHYLAXIS  # DVT: argatroban  # GI: TF      LINES  -Ecmo Cannulas 9/13-9/21  -[facility]J TLC- 9/13  -Left Ax [place] - 9/13 (placed at OSH)  -RA 16g PIV x2- 9/15      GOC  -Palliative Following  -full code  -partner Kiara at bedside daily and updated on care      61 yo F w/ Afib initially presented to urgent care for SOB where she had an XR done concerning for b/l lower infiltrates, sent to [facility] ED and admitted to  on 8/26 after being found to be hypoxemic, reported having  debilitating b/l hand numbness/tingling and some muscles weakness several months. She was found to have interstitial lung disease appearance on CT, with the plan for further ILD workup and management, started on prednisone on admission with gradually improving O2 requirements and stable on 2-3LNC. She underwent bronchoscopy with TBBX on 8/30 which showed Non-Specific Intersitial Disease (NSIP)/OP. Labs notable for positive ARIANA 1:1280, RNP > 8, Alejandro > 8. Patient continued on steroids and received cellcept, which was discontinued 2/2 Afib-RVR. Interval CTA chest on 9/11 also negative PE did show possible lingula pneumonia. Started on empiric vancomycin and zosyn 9/12, course was then c/b episode of SVT to 170s w/ rapidly progressive hypoxemic respiratory failure, placed NRB offered HFNC/BiPAP but refused, leading to worsening hypoxemic respiratory failure requiring intubation on 9/13. Despite best practices, patient remained hypoxemic and patient was cannulated for VV-ECMO on 9/13 and transferred to [facility] for further management. 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 Showed significant lung improvement and was decannulated 9/21. Extubated 9/22.     NEUROLOGY  Home meds: gabapentin 100g TID  AA&Ox3 at baseline   - following commands w/ delirium likely iso prolonged hospitalization, sedation, and ICU setting  - requiring precedex  for anxiety, will start to slowly wean off as tolerated   - can give seroquel 25mg QHS to assist with anxiety  - s/p IV tylenol for c/o generalized pain and low dose morphine given iso elevated LFT's  - CTH 9/14 no acute findings  - Palliative following  - Social Work consult  - PT/OT following    PULMONARY  Admitted to [facility] on 8/26 after p/w SOB, found to be hypoxemic, required 2-4L NC/bibap, initially responded well to IV steroids. 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 2/2 bacterial PNA vs atypical PNA vs aspiration vs AEILD continued to show significant improvement from a lung perspective and was decannulated 9/21, and extubated to HFNC 9/22.  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   - follow up BAL  - Tolerating PS 10/8 pulling TV ~ 300-400ml. Met criteria for extubation. Extubated to HFNC 60L/100%, wean as tolerated  - post extubation ABG : 7.55/35/116/31/98% on HFNC 60%   - continue with duonebs, trialed IPV but vomited shortly after initiation, now discontinued  - s/p empiric abx   - s/p decannulation on 9/21 with plan for suture removal ~7-10 days       CARDIOVASCULAR  Hx of Afib w at OSH, started on Amiodarone gtt now in shock state requiring requiring pressors likely septic with component of vaspoplegia i/s/o sedation, possible contribution of cardiogenic from RV dysfunction   - No PE seen on invasive pulmonary angiography at time of ECMO cannulation or in recent imaging  - NO2 weaned off  (9/15) RV function remains unchanged  - s/p milrinone, off since (9/13)  - converted to sinus (9/14) discontinued amio gtt iso bradycardia   - back to AF with RVR on 9/19 when sedation weaned off c/w metoprolol 12.5mg BID for rate control and increase as needed   - BP labile, has received hydralazine IVP for hypertensive episodes w/ anxiety likely a contributing factor, however is also requiring low dose Roosevelt for intermittent episodes of hypotension that occur mostly while asleep.  - RITCHIE 9/14 : No MEREDITH thrombus noted, negative for PFO. LVOT diameter of 2 cm  - TTE 9/12 with EF 65-70% with normal LV and RV  - TTE 9/14 with  EF 18%. Severe global LV systolic dysfunction. RV enlargement with decreased RV systolic function, however RITCHIE and recent POCUS shows normal LV systolic function, improved RV systolic function   - TTE 9/19 with recovered EF to 67% but still with RV enlargement and systolic dysfunction    GASTROINTESTINAL  Transaminase elevation likely 2/2 combination of shock liver, malposition of ECMO cannula and liver dysfunction  - ALK/ AST/ALT downtrending but remain elevated 490/136/225, T.bili continues to rise 16.3 today  - Acute hepatitis panel negative  - RUQ US 9/15 shows fatty infiltration of liver, negative for hepatobiliary pathology, repeat RUQ US performed for worsening LFT's and rising bili with findings negative as well  - Hepatology consulted - likely shock liver with expected delay in improvement in bilirubin  - Hypertriglyceridemia 836, hx of fatty liver and propofol gtt, s/p insulin gtt, now improved ->184 today  - Episode of vomiting 9/16, tube feeds held and given reglan x48 hours, restarted TF 9/17, and today held for extubation   - failed dysphagia screen, keep NPO pending official swallow eval placed   - Last BM noted 9/20  - continue bowel regimen senna and miralax  - CT abdomen 9/15 w/o bowel obstruction, noted with colonic diverticulosis mostly in sigmoid, w/o evidence of diverticulitis  - continue PPI iso steroids   - nutrition following      RENAL  sCr baseline 0.78  - Creatinine wnl  - s/p diuresis with lasix, last administered 9/15, fluid removal via hemo concentrator while on ECMO circuit, will assess daily if lasix is needed   - s/p Bumex 1mg IVP post extubation   - good UO with primafit   - hypernatremia improved, free H2O discontinued 9/21      INFECTIOUS DISEASE  Leukocytosis, Tmax 99.6F  - Elevated WBC likely iso filgrastim (initiated 9/17 for leukopenia) as well as s/p ECMO decannulation, however would send cultures if continues to uptrend  - leukopenia now resolved s/p filgrastim (9/17-9/21)  - Bactrim DS discontinued iso leukopenia, continue Mepron for PJP prophylaxis instead  - s/p IV Ceftriaxone 5 days? at [facility] out of an abundance of precaution to cover BAL specimen  - s/p zosyn at OSH (9/12) for lingula PNA  - vancomycin (9/12-9/15 ) d/c'd negative mrsa PCR, and azithro (9/14-9/15) d/c'd neg Urine legionella  - s/p empiric donald (9/13-9/20) now that ANC >1.5  - vanco restarted for ppx on 9/18 d/t leukopenia but stopped on 9/20  - 9/13 and 9/16 urine, sputum and blood cx ngtd  - positive COVID-19 antigen in late August  - f/u BAL, cultures, cytopathology,   - ID following- f/u recs      ENDOCRINE  # Hyperglycemia i/s/o steroids  Admission A1c 6.1  - had required insulin gtt but now back on NPH 11 with sliding scale   - elevated triglycerides 835, has hx of fatty liver, had been on propofol gtt. TG downtrending since initiating insulin gtt for hyperglycemia,  - TSH low at 0.13/Free T4 slightly low 0.7, will repeat in a few days      HEME  # ECMO anticoagulation  - argatroban gtt goal aPTT 50-70 for RIJ deep vein thrombosis, continue until negative, duplex x2  - INR elevated likely iso argatroban and liver dysfxn, s/p Vit. K (9/14) and FFP x1 (9/15) - now resolved  - platelets x 7 for thrombocytopenia <50,000, last transfused  9/21, now improving   - fibrinogen 225,   - Hgb remains stable   - Heme following- f/u recs      #Leukopenia  #Thrombocytopenia  - Heme consult placed for thrombocytopenia, noted to also be leukopenic  - unlikely HLH/MAS since many abnormalities can be explained by severe hypoxemia/hypotension during initial decompensation prior to ecmo cannulation but some features that are consistent and with rheumatic disease it is a possibility to f/u hematology regarding utility of further lab/pathologic testing  - peripheral blood smear reviewed: large platelets noted, no platelet clumps, no blast cells noted, neutrophils noted w/ normal number of lobes, nucleated RBC noted  - acute hepatitis panel negative  - s/p filgrastim 480 daily now d/c'd given ANC >1500 in the setting of possible infxn   - Thrombocytopenia refractory after intermittent transfusions will monitor now off circuit, platelet count improving-> 75 K/ul today    #R/O DVT  - LE duplex negative for DVT   - first VA duplex post decannulation 9/21 shows nonocclusive deep vein thrombosis in the right jugular vein and the right cephalic is non-compressible  - second duplex pending       MSK  Onset of debilitating b/l hand cramps and some muscles weakness x several months, unclear etiology, radiculopathy? vs myositis?   - MRI outpatient reportedly showed cervical spine issues, started on Prednisone shortly before admission at OSH  - myomarker panel +ivon only for RNP  - seen by neurologist at [facility]   - symptoms improved with cellcept (9/8-9/11) but discontinued given Afib RVR   - consider restarting home Gabapentin to assist with neuropathic pain? once more alert and awake  - f/u with Dr. Nik aMrie or Dante Urbano for EMG upon discharge (osh?)      RHEUM  - started on Solumedrol 60mg q6h from 9/1 to 9/6 then weaned over time to then Medrol 32 mg 9/9 to 9/12 at [facility]  - s/p Cellcept 9/8 to 9/11 but stopped due to Afib- RVR/tachycardia   - CT findings, Improved/decreased extent of bilateral ground glass opacities and reticular markings compared to the previous study. Findings are nonspecific but differential etiologies include interstitial pneumonia, drug toxicity, nonspecific connective tissue disorders.  - OSH labs show strongly positive ARIANA, RNP, and anti smith antibodies with low C4 and normal C3. Patient with negative SSa and SSb, negative DsDNa, myomarker panel negative (except RNP)  - IL2 receptor elevated 3119.2  - Rheum following  - s/p 1g solumedrol - first dose on (9/13) second dose (9/14) the third and last dose  (9/15) and then solumedrol (1mg/kg) 125mg daily, lowered to 80mg daily (9/19) as per rheum,  will consider taper next week  - s/p plasmapheresis x3  (9/15), (9/18), (9/20) for therapeutic option to rapidly remove autoantibodies and inflammatory factors from plasma d/t severe DAH  - s/p rituximab 9/16/23  - will plan for next rituximab dose ~9/30/23       SKIN  Reportedly had single episode of painful rash on her shins, ears and neck and chest which resolved with a steroid cream from a dermatologist prior to admission  - no evidence of rash currently  - right groin with breakdown s/p cannula placement        PROPHYLAXIS  # DVT: argatroban  # GI: TF      LINES  -Ecmo Cannulas 9/13-9/21  -LIJ TLC- 9/13  -Left Ax [place]aci - 9/13 (placed at OSH)  -RA 16g PIV x2- 9/15      GO  -Palliative Following  -full code  -partner Kiara at bedside daily and updated on care      61 yo F w/ Afib initially presented to urgent care for SOB where she had an XR done concerning for b/l lower infiltrates, sent to [facility] ED and admitted to  on 8/26 after being found to be hypoxemic, reported having  debilitating b/l hand numbness/tingling and some muscles weakness several months. She was found to have interstitial lung disease appearance on CT, with the plan for further ILD workup and management, started on prednisone on admission with gradually improving O2 requirements and stable on 2-3LNC. She underwent bronchoscopy with TBBX on 8/30 which showed Non-Specific Intersitial Disease (NSIP)/OP. Labs notable for positive ARIANA 1:1280, RNP > 8, Alejandro > 8. Patient continued on steroids and received cellcept, which was discontinued 2/2 Afib-RVR. Interval CTA chest on 9/11 also negative PE did show possible lingula pneumonia. Started on empiric vancomycin and zosyn 9/12, course was then c/b episode of SVT to 170s w/ rapidly progressive hypoxemic respiratory failure, placed NRB offered HFNC/BiPAP but refused, leading to worsening hypoxemic respiratory failure requiring intubation on 9/13. Despite best practices, patient remained hypoxemic and patient was cannulated for VV-ECMO on 9/13 and transferred to [facility] for further management. 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 Showed significant lung improvement and was decannulated 9/21. Extubated 9/22.     NEUROLOGY  Home meds: gabapentin 100g TID  AA&Ox3 at baseline   - following commands w/ delirium likely iso prolonged hospitalization, sedation, and ICU setting  - requiring precedex  for anxiety, will start to slowly wean off as tolerated   - can give seroquel 25mg QHS to assist with anxiety  - s/p IV tylenol for c/o generalized pain and low dose morphine given iso elevated LFT's  - CTH 9/14 no acute findings  - Palliative following  - Social Work consult  - PT/OT following    PULMONARY  Admitted to [facility] on 8/26 after p/w SOB, found to be hypoxemic, required 2-4L NC/bibap, initially responded well to IV steroids. 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 2/2 bacterial PNA vs atypical PNA vs aspiration vs AEILD continued to show significant improvement from a lung perspective and was decannulated 9/21, and extubated to HFNC 9/22.  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   - follow up BAL  - Tolerating PS 10/8 pulling TV ~ 300-400ml. Met criteria for extubation. Extubated to HFNC 60L/100%, wean as tolerated  - post extubation ABG : 7.55/35/116/31/98% on HFNC 60%   - continue with duonebs, trialed IPV but vomited shortly after initiation, now discontinued  - s/p empiric abx   - s/p decannulation on 9/21 with plan for suture removal ~7-10 days       CARDIOVASCULAR  Hx of Afib w at OSH, started on Amiodarone gtt now in shock state requiring requiring pressors likely septic with component of vaspoplegia i/s/o sedation, possible contribution of cardiogenic from RV dysfunction   - No PE seen on invasive pulmonary angiography at time of ECMO cannulation or in recent imaging  - NO2 weaned off  (9/15) RV function remains unchanged  - s/p milrinone, off since (9/13)  - converted to sinus (9/14) discontinued amio gtt iso bradycardia   - back to AF with RVR on 9/19 when sedation weaned off c/w metoprolol 12.5mg BID for rate control and increase as needed   - BP labile, has received hydralazine IVP for hypertensive episodes w/ anxiety likely a contributing factor, however is also requiring low dose Neosynepherine for intermittent episodes of hypotension that occur mostly while asleep.  - RITCHIE 9/14 : No MEREDITH thrombus noted, negative for PFO. LVOT diameter of 2 cm  - TTE 9/12 with EF 65-70% with normal LV and RV  - TTE 9/14 with  EF 18%. Severe global LV systolic dysfunction. RV enlargement with decreased RV systolic function, however RITCHIE and recent POCUS shows normal LV systolic function, improved RV systolic function   - TTE 9/19 with recovered EF to 67% but still with RV enlargement and systolic dysfunction    GASTROINTESTINAL  Transaminase elevation likely 2/2 combination of shock liver, malposition of ECMO cannula and liver dysfunction  - ALK/ AST/ALT downtrending but remain elevated 490/136/225, T.bili continues to rise 16.3 today  - Acute hepatitis panel negative  - RUQ US 9/15 shows fatty infiltration of liver, negative for hepatobiliary pathology, repeat RUQ US performed 9/22 for worsening LFT's and rising bili with findings negative as well  - Hepatology consulted - likely shock liver with expected delay in improvement in bilirubin  - Hypertriglyceridemia 836, hx of fatty liver and propofol gtt, s/p insulin gtt, now improved ->184 today  - Episode of vomiting 9/16, tube feeds held and given reglan x48 hours, restarted TF 9/17, and today held for extubation   - failed dysphagia screen, keep NPO pending official swallow eval placed   - Last BM noted 9/20  - continue bowel regimen senna and miralax  - CT abdomen 9/15 w/o bowel obstruction, noted with colonic diverticulosis mostly in sigmoid, w/o evidence of diverticulitis  - continue PPI iso steroids   - nutrition following      RENAL  sCr baseline 0.78  - Creatinine wnl  - s/p diuresis with lasix, last administered 9/15, fluid removal via hemo concentrator while on ECMO circuit, will assess daily if lasix is needed   - s/p Bumex 1mg IVP to optimize post extubation   - good UO with primafit  - hypernatremia improved, free H2O discontinued 9/21      INFECTIOUS DISEASE  Leukocytosis, Tmax 99.6F  - Elevated WBC likely iso filgrastim (initiated 9/17 for leukopenia) as well as s/p ECMO decannulation, however would send cultures if continues to uptrend  - leukopenia now resolved s/p filgrastim (9/17-9/21)  - Bactrim DS discontinued iso leukopenia, continue Mepron for PJP prophylaxis instead  - s/p IV Ceftriaxone 5 days? at [facility] out of an abundance of precaution to cover BAL specimen  - s/p zosyn at OSH (9/12) for lingula PNA  - vancomycin (9/12-9/15 ) d/c'd negative mrsa PCR, and azithro (9/14-9/15) d/c'd neg Urine legionella  - s/p empiric donald (9/13-9/20) now that ANC >1.5  - vanco restarted for ppx on 9/18 d/t leukopenia but stopped on 9/20  - 9/13 and 9/16 urine, sputum and blood cx ngtd  - positive COVID-19 antigen in late August  - f/u BAL, cultures, cytopathology,   - ID following- f/u recs      ENDOCRINE  # Hyperglycemia i/s/o steroids  Admission A1c 6.1  - had required insulin gtt but now back on NPH 11 with sliding scale   - elevated triglycerides 835, has hx of fatty liver, had been on propofol gtt. TG downtrending since initiating insulin gtt for hyperglycemia,  - TSH low at 0.13/Free T4 slightly low 0.7, will repeat in a few days      HEME  # ECMO anticoagulation  - argatroban gtt goal aPTT 50-70 for RIJ deep vein thrombosis, continue until negative, duplex x2  - INR elevated likely iso argatroban and liver dysfxn, s/p Vit. K (9/14) and FFP x1 (9/15) - now resolved  - platelets x 7 for thrombocytopenia <50,000, last transfused  9/21, now improving   - fibrinogen 225,   - Hgb remains stable   - Heme following- f/u recs      #Leukopenia  #Thrombocytopenia  - Heme consult placed for thrombocytopenia, noted to also be leukopenic  - unlikely HLH/MAS since many abnormalities can be explained by severe hypoxemia/hypotension during initial decompensation prior to ecmo cannulation but some features that are consistent and with rheumatic disease it is a possibility to f/u hematology regarding utility of further lab/pathologic testing  - peripheral blood smear reviewed: large platelets noted, no platelet clumps, no blast cells noted, neutrophils noted w/ normal number of lobes, nucleated RBC noted  - acute hepatitis panel negative  - s/p filgrastim 480 daily now d/c'd given ANC >1500 in the setting of possible infxn   - Thrombocytopenia refractory after intermittent transfusions will monitor now off circuit, platelet count improving-> 75 K/ul today    #R/O DVT  - LE duplex negative for DVT   - first VA duplex post decannulation 9/21 shows nonocclusive deep vein thrombosis in the right jugular vein and the right cephalic is non-compressible  - second duplex pending       MSK  Onset of debilitating b/l hand cramps and some muscles weakness x several months, unclear etiology, radiculopathy? vs myositis?   - MRI outpatient reportedly showed cervical spine issues, started on Prednisone shortly before admission at OSH  - myomarker panel +ivon only for RNP  - seen by neurologist at [facility]   - symptoms improved with cellcept (9/8-9/11) but discontinued given Afib RVR   - consider restarting home Gabapentin to assist with neuropathic pain? once more alert and awake  - f/u with Dr. Nik Marie or Dante Urbano for EMG upon discharge (osh?)      RHEUM  - started on Solumedrol 60mg q6h from 9/1 to 9/6 then weaned over time to then Medrol 32 mg 9/9 to 9/12 at [facility]  - s/p Cellcept 9/8 to 9/11 but stopped due to Afib- RVR/tachycardia   - CT findings, Improved/decreased extent of bilateral ground glass opacities and reticular markings compared to the previous study. Findings are nonspecific but differential etiologies include interstitial pneumonia, drug toxicity, nonspecific connective tissue disorders.  - OSH labs show strongly positive ARIANA, RNP, and anti smith antibodies with low C4 and normal C3. Patient with negative SSa and SSb, negative DsDNa, myomarker panel negative (except RNP)  - IL2 receptor elevated 3119.2  - Rheum following  - s/p 1g solumedrol - first dose on (9/13) second dose (9/14) the third and last dose  (9/15) and then solumedrol (1mg/kg) 125mg daily, lowered to 80mg daily (9/19) as per rheum,  will consider taper next week  - s/p plasmapheresis x3  (9/15), (9/18), (9/20) for therapeutic option to rapidly remove autoantibodies and inflammatory factors from plasma d/t severe DAH  - s/p rituximab 9/16/23  - will plan for next rituximab dose ~9/30/23       SKIN  Reportedly had single episode of painful rash on her shins, ears and neck and chest which resolved with a steroid cream from a dermatologist prior to admission  - no evidence of rash currently  - right groin with breakdown s/p cannula placement        PROPHYLAXIS  # DVT: argatroban  # GI: TF      LINES  -Ecmo Cannulas 9/13-9/21  -LIJ TLC- 9/13  -Left Ax [place] - 9/13 (placed at OSH)  -RA 16g PIV x2- 9/15      [facility]  -Palliative Following  -full code  -partner Kiara at bedside daily and updated on care      63 yo F w/ Afib initially presented to urgent care for SOB where she had an XR done concerning for b/l lower infiltrates, sent to [facility] ED and admitted to  on 8/26 after being found to be hypoxemic, reported having  debilitating b/l hand numbness/tingling and some muscles weakness several months. She was found to have interstitial lung disease appearance on CT, with the plan for further ILD workup and management, started on prednisone on admission with gradually improving O2 requirements and stable on 2-3LNC. She underwent bronchoscopy with TBBX on 8/30 which showed Non-Specific Intersitial Disease (NSIP)/OP. Labs notable for positive ARIANA 1:1280, RNP > 8, Alejandro > 8. Patient continued on steroids and received cellcept, which was discontinued 2/2 Afib-RVR. Interval CTA chest on 9/11 also negative PE did show possible lingula pneumonia. Started on empiric vancomycin and zosyn 9/12, course was then c/b episode of SVT to 170s w/ rapidly progressive hypoxemic respiratory failure, placed NRB offered HFNC/BiPAP but refused, leading to worsening hypoxemic respiratory failure requiring intubation on 9/13. Despite best practices, patient remained hypoxemic and patient was cannulated for VV-ECMO on 9/13 and transferred to [facility] for further management. 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 Showed significant lung improvement and was decannulated 9/21. Extubated 9/22.     NEUROLOGY  Home meds: gabapentin 100g TID  AA&Ox3 at baseline   - following commands w/ delirium likely iso prolonged hospitalization, sedation, and ICU setting  - requiring precedex  for anxiety, will start to slowly wean off as tolerated   - can give seroquel 25mg QHS to assist with anxiety  - s/p IV tylenol and low dose morphine IV for c/o generalized pain   - CT 9/14 no acute findings  - Palliative following  - Social Work consult  - PT/OT following    PULMONARY  Admitted to [facility] on 8/26 after p/w SOB, found to be hypoxemic, required 2-4L NC/bibap, initially responded well to IV steroids. 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 2/2 bacterial PNA vs atypical PNA vs aspiration vs AEILD continued to show significant improvement from a lung perspective and was decannulated 9/21, and extubated to HFNC 9/22.  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   - follow up BAL cultures  - Tolerating PS 10/8 pulling TV ~ 300-400ml. Met criteria for extubation. Extubated to HFNC 60L/100%, wean as tolerated  - post extubation ABG : 7.55/35/116/31/98% on HFNC 60%   - continue with duonebs, trialed IPV but vomited shortly after initiation, now discontinued  - s/p empiric abx   - s/p decannulation on 9/21 with plan for suture removal ~7-10 days       CARDIOVASCULAR  Hx of Afib w at OSH, started on Amiodarone gtt now in shock state requiring requiring pressors likely septic with component of vaspoplegia i/s/o sedation, possible contribution of cardiogenic from RV dysfunction   - No PE seen on invasive pulmonary angiography at time of ECMO cannulation or in recent imaging  - NO2 weaned off  (9/15) RV function remains unchanged  - s/p milrinone, off since (9/13)  - converted to sinus (9/14) discontinued amio gtt iso bradycardia   - back to AF with RVR on 9/19 when sedation weaned off c/w metoprolol 12.5mg BID for rate control and increase as needed   - BP labile, has received hydralazine IVP for hypertensive episodes w/ anxiety likely a contributing factor, however is also requiring low dose Phenylephrine  for intermittent episodes of hypotension that occurs mostly while asleep.  - RITCHIE 9/14 : No MEREDITH thrombus noted, negative for PFO. LVOT diameter of 2 cm  - TTE 9/12 with EF 65-70% with normal LV and RV  - TTE 9/14 with  EF 18%. Severe global LV systolic dysfunction. RV enlargement with decreased RV systolic function, however RITCHIE and recent POCUS shows normal LV systolic function, improved RV systolic function   - TTE 9/19 with recovered EF to 67% but still with RV enlargement and systolic dysfunction    GASTROINTESTINAL  Transaminase elevation likely 2/2 combination of shock liver, malposition of ECMO cannula and liver dysfunction  - ALK/ AST/ALT downtrending but remain elevated 490/136/225, T.bili continues to rise 16.3 today  - Acute hepatitis panel negative  - RUQ US 9/15 shows fatty infiltration of liver, negative for hepatobiliary pathology, repeat RUQ US performed 9/22 for worsening LFT's and rising bili with findings negative as well  - Hepatology consulted - likely shock liver with expected delay in improvement in bilirubin  - Hypertriglyceridemia 836, hx of fatty liver and propofol gtt, s/p insulin gtt, now improved ->184 today  - Episode of vomiting 9/16, tube feeds held and given reglan x48 hours, restarted TF 9/17, and today held for extubation   - failed dysphagia screen, keep NPO pending official swallow eval placed   - Last BM noted 9/20  - continue bowel regimen senna and miralax  - CT abdomen 9/15 w/o bowel obstruction, noted with colonic diverticulosis mostly in sigmoid, w/o evidence of diverticulitis  - continue PPI iso steroids   - nutrition following      RENAL  sCr baseline 0.78  - Creatinine wnl  - s/p diuresis with lasix, last administered 9/15, fluid removal via hemo concentrator while on ECMO circuit, will assess daily if lasix is needed   - s/p Bumex 1mg IVP given to optimize post extubation   - good UO with primafit  - hypernatremia improved, free H2O discontinued 9/21      INFECTIOUS DISEASE  Leukocytosis, Tmax 99.6F  - Elevated WBC likely iso filgrastim (initiated 9/17 for leukopenia) as well as s/p ECMO decannulation, however would send cultures if continues to uptrend or febrile  - monitoring off antibiotics for now  - leukopenia now resolved s/p filgrastim (9/17-9/21)  - Bactrim DS discontinued iso leukopenia, continue Mepron for PJP prophylaxis instead  - s/p IV Ceftriaxone 5 days? at [facility] out of an abundance of precaution to cover BAL specimen  - s/p zosyn at OSH (9/12) for lingula PNA  - vancomycin (9/12-9/15 ) d/c'd negative mrsa PCR, and azithro (9/14-9/15) d/c'd neg Urine legionella  - s/p empiric donald (9/13-9/20) now that ANC >1.5  - vanco restarted for ppx on 9/18 d/t leukopenia but stopped on 9/20  - 9/13 and 9/16 urine, sputum and blood cx ngtd  - positive COVID-19 antigen in late August  - f/u BAL, cultures, cytopathology,   - ID following- f/u recs      ENDOCRINE  # Hyperglycemia i/s/o steroids  Admission A1c 6.1  - had required insulin gtt but now back on NPH 11 with sliding scale   - elevated triglycerides 835, has hx of fatty liver, had been on propofol gtt. TG downtrending since initiating insulin gtt for hyperglycemia,  - TSH low at 0.13/Free T4 slightly low 0.7, will repeat in a few days      HEME  # ECMO anticoagulation  - argatroban gtt goal aPTT 50-70 for RIJ deep vein thrombosis, continue until negative, duplex x2  - INR elevated likely iso argatroban and liver dysfxn, s/p Vit. K (9/14) and FFP x1 (9/15) - now resolved  - platelets x 7 for thrombocytopenia <50,000, last transfused  9/21, now improving   - fibrinogen 225,   - Hgb remains stable   - Heme following- f/u recs      #Leukopenia  #Thrombocytopenia  - Heme consult placed for thrombocytopenia, noted to also be leukopenic  - unlikely HLH/MAS since many abnormalities can be explained by severe hypoxemia/hypotension during initial decompensation prior to ecmo cannulation but some features that are consistent and with rheumatic disease it is a possibility to f/u hematology regarding utility of further lab/pathologic testing  - peripheral blood smear reviewed: large platelets noted, no platelet clumps, no blast cells noted, neutrophils noted w/ normal number of lobes, nucleated RBC noted  - acute hepatitis panel negative  - s/p filgrastim 480 daily now d/c'd given ANC >1500 in the setting of possible infxn   - Thrombocytopenia refractory after intermittent transfusions will monitor now off circuit, platelet count improving-> 75 K/ul today    #R/O DVT  - LE duplex negative for DVT   - first VA duplex post decannulation 9/21 shows non-occlusive deep vein thrombosis in the right jugular vein and the right cephalic is non-compressible  - second duplex pending       MSK  Onset of debilitating b/l hand cramps and some muscles weakness x several months, unclear etiology, radiculopathy? vs myositis?   - MRI outpatient reportedly showed cervical spine issues, started on Prednisone shortly before admission at OSH  - myomarker panel +ivon only for RNP  - seen by neurologist at [facility]   - symptoms improved with cellcept (9/8-9/11) but discontinued given Afib RVR   - consider restarting home Gabapentin to assist with neuropathic pain? once more alert and awake  - f/u with Dr. Nik Marie or Dante Urbano for EMG upon discharge (osh?)      RHEUM  - started on Solumedrol 60mg q6h from 9/1 to 9/6 then weaned over time to then Medrol 32 mg 9/9 to 9/12 at [facility]  - s/p Cellcept 9/8 to 9/11 but stopped due to Afib- RVR/tachycardia   - CT findings, Improved/decreased extent of bilateral ground glass opacities and reticular markings compared to the previous study. Findings are nonspecific but differential etiologies include interstitial pneumonia, drug toxicity, nonspecific connective tissue disorders.  - OSH labs show strongly positive RAIANA, RNP, and anti smith antibodies with low C4 and normal C3. Patient with negative SSa and SSb, negative DsDNa, myomarker panel negative (except RNP)  - IL2 receptor elevated 3119.2  - Rheum following  - s/p 1g solumedrol - first dose on (9/13) second dose (9/14) the third and last dose  (9/15) and then solumedrol (1mg/kg) 125mg daily, lowered to 80mg daily (9/19) as per rheum,  will consider taper next week  - s/p plasmapheresis x3  (9/15), (9/18), (9/20) for therapeutic option to rapidly remove autoantibodies and inflammatory factors from plasma d/t severe DAH  - s/p rituximab 9/16/23  - will plan for next rituximab dose ~9/30/23       SKIN  Reportedly had single episode of painful rash on her shins, ears and neck and chest which resolved with a steroid cream from a dermatologist prior to admission  - no evidence of rash currently  - right groin with breakdown s/p cannula placement        PROPHYLAXIS  # DVT: argatroban  # GI: TF      LINES  -Ecmo Cannulas 9/13-9/21  -[facility] TLC- 9/13  -Left Ax [place] - 9/13 (placed at OSH)  -RA 16g PIV x2- 9/15      GO  -Palliative Following  -full code  -partner Kiara at bedside daily and updated on care

## 2023-09-23 LAB
ALBUMIN SERPL ELPH-MCNC: 3.1 G/DL — LOW (ref 3.3–5)
ALBUMIN SERPL ELPH-MCNC: 3.2 G/DL — LOW (ref 3.3–5)
ALP SERPL-CCNC: 488 U/L — HIGH (ref 40–120)
ALP SERPL-CCNC: 495 U/L — HIGH (ref 40–120)
ALT FLD-CCNC: 185 U/L — HIGH (ref 4–33)
ALT FLD-CCNC: 212 U/L — HIGH (ref 4–33)
AMMONIA BLD-MCNC: 29 UMOL/L — SIGNIFICANT CHANGE UP (ref 11–55)
ANION GAP SERPL CALC-SCNC: 10 MMOL/L — SIGNIFICANT CHANGE UP (ref 7–14)
ANION GAP SERPL CALC-SCNC: 10 MMOL/L — SIGNIFICANT CHANGE UP (ref 7–14)
ANISOCYTOSIS BLD QL: SLIGHT — SIGNIFICANT CHANGE UP
APTT BLD: 59.9 SEC — HIGH (ref 24.5–35.6)
APTT BLD: 60 SEC — HIGH (ref 24.5–35.6)
AST SERPL-CCNC: 89 U/L — HIGH (ref 4–32)
AST SERPL-CCNC: 92 U/L — HIGH (ref 4–32)
BASOPHILS # BLD AUTO: 0 K/UL — SIGNIFICANT CHANGE UP (ref 0–0.2)
BASOPHILS # BLD AUTO: 0.02 K/UL — SIGNIFICANT CHANGE UP (ref 0–0.2)
BASOPHILS NFR BLD AUTO: 0 % — SIGNIFICANT CHANGE UP (ref 0–2)
BASOPHILS NFR BLD AUTO: 0.1 % — SIGNIFICANT CHANGE UP (ref 0–2)
BILIRUB SERPL-MCNC: 15.3 MG/DL — HIGH (ref 0.2–1.2)
BILIRUB SERPL-MCNC: 16.6 MG/DL — HIGH (ref 0.2–1.2)
BLOOD GAS ARTERIAL - LYTES,HGB,ICA,LACT RESULT: SIGNIFICANT CHANGE UP
BLOOD GAS ARTERIAL - LYTES,HGB,ICA,LACT RESULT: SIGNIFICANT CHANGE UP
BUN SERPL-MCNC: 34 MG/DL — HIGH (ref 7–23)
BUN SERPL-MCNC: 36 MG/DL — HIGH (ref 7–23)
CALCIUM SERPL-MCNC: 9.4 MG/DL — SIGNIFICANT CHANGE UP (ref 8.4–10.5)
CALCIUM SERPL-MCNC: 9.7 MG/DL — SIGNIFICANT CHANGE UP (ref 8.4–10.5)
CD3-/CD16+CD56+(%): 7 % — SIGNIFICANT CHANGE UP (ref 4–25)
CD3-CD16+CD56+(ABS): 10 CELLS/UL — LOW (ref 70–760)
CHLORIDE SERPL-SCNC: 103 MMOL/L — SIGNIFICANT CHANGE UP (ref 98–107)
CHLORIDE SERPL-SCNC: 99 MMOL/L — SIGNIFICANT CHANGE UP (ref 98–107)
CO2 SERPL-SCNC: 27 MMOL/L — SIGNIFICANT CHANGE UP (ref 22–31)
CO2 SERPL-SCNC: 27 MMOL/L — SIGNIFICANT CHANGE UP (ref 22–31)
CREAT SERPL-MCNC: 0.48 MG/DL — LOW (ref 0.5–1.3)
CREAT SERPL-MCNC: 0.51 MG/DL — SIGNIFICANT CHANGE UP (ref 0.5–1.3)
EGFR: 104 ML/MIN/1.73M2 — SIGNIFICANT CHANGE UP
EGFR: 106 ML/MIN/1.73M2 — SIGNIFICANT CHANGE UP
EOSINOPHIL # BLD AUTO: 0 K/UL — SIGNIFICANT CHANGE UP (ref 0–0.5)
EOSINOPHIL # BLD AUTO: 0.02 K/UL — SIGNIFICANT CHANGE UP (ref 0–0.5)
EOSINOPHIL NFR BLD AUTO: 0 % — SIGNIFICANT CHANGE UP (ref 0–6)
EOSINOPHIL NFR BLD AUTO: 0.1 % — SIGNIFICANT CHANGE UP (ref 0–6)
GIANT PLATELETS BLD QL SMEAR: PRESENT — SIGNIFICANT CHANGE UP
GLUCOSE BLDC GLUCOMTR-MCNC: 109 MG/DL — HIGH (ref 70–99)
GLUCOSE BLDC GLUCOMTR-MCNC: 117 MG/DL — HIGH (ref 70–99)
GLUCOSE BLDC GLUCOMTR-MCNC: 129 MG/DL — HIGH (ref 70–99)
GLUCOSE BLDC GLUCOMTR-MCNC: 129 MG/DL — HIGH (ref 70–99)
GLUCOSE BLDC GLUCOMTR-MCNC: 180 MG/DL — HIGH (ref 70–99)
GLUCOSE SERPL-MCNC: 125 MG/DL — HIGH (ref 70–99)
GLUCOSE SERPL-MCNC: 130 MG/DL — HIGH (ref 70–99)
HCT VFR BLD CALC: 36.6 % — SIGNIFICANT CHANGE UP (ref 34.5–45)
HCT VFR BLD CALC: 36.8 % — SIGNIFICANT CHANGE UP (ref 34.5–45)
HGB BLD-MCNC: 12 G/DL — SIGNIFICANT CHANGE UP (ref 11.5–15.5)
HGB BLD-MCNC: 12.1 G/DL — SIGNIFICANT CHANGE UP (ref 11.5–15.5)
IANC: 21.26 K/UL — HIGH (ref 1.8–7.4)
IANC: 22.58 K/UL — HIGH (ref 1.8–7.4)
IMM GRANULOCYTES NFR BLD AUTO: 3.9 % — HIGH (ref 0–0.9)
INR BLD: 2.05 RATIO — HIGH (ref 0.85–1.18)
LYMPHOCYTES # BLD AUTO: 0.51 K/UL — LOW (ref 1–3.3)
LYMPHOCYTES # BLD AUTO: 0.87 K/UL — LOW (ref 1–3.3)
LYMPHOCYTES # BLD AUTO: 1.9 % — LOW (ref 13–44)
LYMPHOCYTES # BLD AUTO: 3.5 % — LOW (ref 13–44)
LYMPHOCYTES, ABSOLUTE: 134 CELLS/UL — LOW (ref 850–3900)
MACROCYTES BLD QL: SLIGHT — SIGNIFICANT CHANGE UP
MAGNESIUM SERPL-MCNC: 2.3 MG/DL — SIGNIFICANT CHANGE UP (ref 1.6–2.6)
MAGNESIUM SERPL-MCNC: 2.4 MG/DL — SIGNIFICANT CHANGE UP (ref 1.6–2.6)
MCHC RBC-ENTMCNC: 30.2 PG — SIGNIFICANT CHANGE UP (ref 27–34)
MCHC RBC-ENTMCNC: 30.9 PG — SIGNIFICANT CHANGE UP (ref 27–34)
MCHC RBC-ENTMCNC: 32.8 GM/DL — SIGNIFICANT CHANGE UP (ref 32–36)
MCHC RBC-ENTMCNC: 32.9 GM/DL — SIGNIFICANT CHANGE UP (ref 32–36)
MCV RBC AUTO: 92.2 FL — SIGNIFICANT CHANGE UP (ref 80–100)
MCV RBC AUTO: 94.1 FL — SIGNIFICANT CHANGE UP (ref 80–100)
MONOCYTES # BLD AUTO: 1.74 K/UL — HIGH (ref 0–0.9)
MONOCYTES # BLD AUTO: 2.12 K/UL — HIGH (ref 0–0.9)
MONOCYTES NFR BLD AUTO: 7 % — SIGNIFICANT CHANGE UP (ref 2–14)
MONOCYTES NFR BLD AUTO: 8.1 % — SIGNIFICANT CHANGE UP (ref 2–14)
NEUTROPHILS # BLD AUTO: 22.19 K/UL — HIGH (ref 1.8–7.4)
NEUTROPHILS # BLD AUTO: 22.58 K/UL — HIGH (ref 1.8–7.4)
NEUTROPHILS NFR BLD AUTO: 84.2 % — HIGH (ref 43–77)
NEUTROPHILS NFR BLD AUTO: 85.9 % — HIGH (ref 43–77)
NEUTS BAND # BLD: 5.3 % — SIGNIFICANT CHANGE UP (ref 0–6)
NRBC # BLD: 0 /100 WBCS — SIGNIFICANT CHANGE UP (ref 0–0)
NRBC # BLD: 1 /100 — HIGH (ref 0–0)
NRBC # FLD: 0.19 K/UL — HIGH (ref 0–0)
OVALOCYTES BLD QL SMEAR: SLIGHT — SIGNIFICANT CHANGE UP
PHOSPHATE SERPL-MCNC: 3.6 MG/DL — SIGNIFICANT CHANGE UP (ref 2.5–4.5)
PHOSPHATE SERPL-MCNC: 4 MG/DL — SIGNIFICANT CHANGE UP (ref 2.5–4.5)
PLAT MORPH BLD: ABNORMAL
PLATELET # BLD AUTO: 104 K/UL — LOW (ref 150–400)
PLATELET # BLD AUTO: 136 K/UL — LOW (ref 150–400)
PLATELET COUNT - ESTIMATE: ABNORMAL
POIKILOCYTOSIS BLD QL AUTO: SLIGHT — SIGNIFICANT CHANGE UP
POTASSIUM SERPL-MCNC: 4.3 MMOL/L — SIGNIFICANT CHANGE UP (ref 3.5–5.3)
POTASSIUM SERPL-MCNC: 4.3 MMOL/L — SIGNIFICANT CHANGE UP (ref 3.5–5.3)
POTASSIUM SERPL-SCNC: 4.3 MMOL/L — SIGNIFICANT CHANGE UP (ref 3.5–5.3)
POTASSIUM SERPL-SCNC: 4.3 MMOL/L — SIGNIFICANT CHANGE UP (ref 3.5–5.3)
PROT SERPL-MCNC: 4.9 G/DL — LOW (ref 6–8.3)
PROT SERPL-MCNC: 5.3 G/DL — LOW (ref 6–8.3)
PROTHROM AB SERPL-ACNC: 22.7 SEC — HIGH (ref 9.5–13)
RBC # BLD: 3.91 M/UL — SIGNIFICANT CHANGE UP (ref 3.8–5.2)
RBC # BLD: 3.97 M/UL — SIGNIFICANT CHANGE UP (ref 3.8–5.2)
RBC # FLD: 18.9 % — HIGH (ref 10.3–14.5)
RBC # FLD: 20.1 % — HIGH (ref 10.3–14.5)
RBC BLD AUTO: ABNORMAL
SODIUM SERPL-SCNC: 136 MMOL/L — SIGNIFICANT CHANGE UP (ref 135–145)
SODIUM SERPL-SCNC: 140 MMOL/L — SIGNIFICANT CHANGE UP (ref 135–145)
TRIGL SERPL-MCNC: 162 MG/DL — HIGH
WBC # BLD: 24.79 K/UL — HIGH (ref 3.8–10.5)
WBC # BLD: 26.28 K/UL — HIGH (ref 3.8–10.5)
WBC # FLD AUTO: 24.79 K/UL — HIGH (ref 3.8–10.5)
WBC # FLD AUTO: 26.28 K/UL — HIGH (ref 3.8–10.5)

## 2023-09-23 PROCEDURE — 99291 CRITICAL CARE FIRST HOUR: CPT

## 2023-09-23 RX ORDER — METOPROLOL TARTRATE 50 MG
5 TABLET ORAL ONCE
Refills: 0 | Status: COMPLETED | OUTPATIENT
Start: 2023-09-23 | End: 2023-09-23

## 2023-09-23 RX ADMIN — Medication 3 MILLILITER(S): at 15:15

## 2023-09-23 RX ADMIN — Medication 1: at 11:39

## 2023-09-23 RX ADMIN — HUMAN INSULIN 11 UNIT(S): 100 INJECTION, SUSPENSION SUBCUTANEOUS at 11:39

## 2023-09-23 RX ADMIN — ARGATROBAN 3.06 MICROGRAM(S)/KG/MIN: 50 INJECTION, SOLUTION INTRAVENOUS at 07:58

## 2023-09-23 RX ADMIN — CHLORHEXIDINE GLUCONATE 1 APPLICATION(S): 213 SOLUTION TOPICAL at 05:11

## 2023-09-23 RX ADMIN — Medication 80 MILLIGRAM(S): at 05:10

## 2023-09-23 RX ADMIN — Medication 3 MILLILITER(S): at 08:49

## 2023-09-23 RX ADMIN — Medication 5 MILLIGRAM(S): at 04:49

## 2023-09-23 RX ADMIN — Medication 3 MILLILITER(S): at 22:54

## 2023-09-23 RX ADMIN — PANTOPRAZOLE SODIUM 40 MILLIGRAM(S): 20 TABLET, DELAYED RELEASE ORAL at 11:39

## 2023-09-23 RX ADMIN — ARGATROBAN 3.06 MICROGRAM(S)/KG/MIN: 50 INJECTION, SOLUTION INTRAVENOUS at 19:20

## 2023-09-23 RX ADMIN — Medication 5 MILLIGRAM(S): at 20:07

## 2023-09-23 RX ADMIN — PHENYLEPHRINE HYDROCHLORIDE 23.9 MICROGRAM(S)/KG/MIN: 10 INJECTION INTRAVENOUS at 07:57

## 2023-09-23 RX ADMIN — Medication 1 DROP(S): at 17:07

## 2023-09-23 RX ADMIN — Medication 1 DROP(S): at 05:10

## 2023-09-23 RX ADMIN — Medication 3 MILLILITER(S): at 03:45

## 2023-09-23 NOTE — PROGRESS NOTE ADULT - ATTENDING COMMENTS
62 F with afib, recently diagnosed with MCTD-ILD (anti RNP) admitted to [facility] with acute hypoxemic respiratory failure c/b acute hypoxemic and hypercapnic respiratory failure requiring intubation and VV- ECMO on 9/13, tx to [facility], concerning for DAH vs ILD flare, decannulated from V-V ecmo 9/21, extubated morning of 9/22 to HFNC    Patient brought to [facility] for ARDs Requiring V-V ECMO. Serologies positive for mainly MCTD-ILD, s/p pulse, PLEX and dose of RTX. Concern of also pulmonary edema. Now improved decannulated and extubated.     Still with post ECMO delirium. WBC increased but in the setting of Filgrastim. Bii improved, still on HFNC. Failed Bedside dysphagia, pending official S/S eval. Runs of SVT overnight.     # acute hypoxemic respiratory failure  # MCTD-ILD  # left IJ DVT   # encephalopathy  # transaminitis/hyperbilirubinemia  # immunosuppressed  # SVT  # oropharyngeal dysphagia   - C/W HFNC, decrease flow rate. trend PaO2 slight decrease from yesterday, check CXR  - SVT overnight, resolved, monitor on tele, was given IVP lopressor. Will restart PO once PO access established  - Pending official S/S, partner at bedside request delay in NGT placement for 1 more day.  - C/W steroids, taper likely next week. S/P plex, and RTX. Pending repeat dose on the 30th  - WBC likely 2/2 to Filgrastim, given immunosuppression will monitor closely for infection.   - Bili improving, will continue to trend, recall hepatology if continued to be elevated  - C/W argatroban gtt, given IJ thrombus  - Continue to re-orient, likely ICU/post ecmo delirium. Seroquel when able to get po meds.   - C/W PT/OT.   - DVT ppx- on full a/c  - Dispo- full code. D/W Partner at bedside.

## 2023-09-23 NOTE — SWALLOW BEDSIDE ASSESSMENT ADULT - ADDITIONAL RECOMMENDATIONS
1). Given severity of deficits at bedside, pt. is judged to be a high aspiration risk   2). This service to f/u as scheduling permits for re-assessment of PO trials  3). Medical team to re-consult as pt. becomes medically optimized to participate in reassessment

## 2023-09-23 NOTE — PROGRESS NOTE ADULT - ASSESSMENT
63 yo F w/ Afib initially presented to urgent care for SOB where she had an XR done concerning for b/l lower infiltrates, sent to [facility] ED and admitted to  on 8/26 after being found to be hypoxemic, reported having  debilitating b/l hand numbness/tingling and some muscles weakness several months. She was found to have interstitial lung disease appearance on CT, with the plan for further ILD workup and management, started on prednisone on admission with gradually improving O2 requirements and stable on 2-3LNC. She underwent bronchoscopy with TBBX on 8/30 which showed Non-Specific Intersitial Disease (NSIP)/OP. Labs notable for positive ARIANA 1:1280, RNP > 8, Alejandro > 8. Patient continued on steroids and received cellcept, which was discontinued 2/2 Afib-RVR. Interval CTA chest on 9/11 also negative PE did show possible lingula pneumonia. Started on empiric vancomycin and zosyn 9/12, course was then c/b episode of SVT to 170s w/ rapidly progressive hypoxemic respiratory failure, placed NRB offered HFNC/BiPAP but refused, leading to worsening hypoxemic respiratory failure requiring intubation on 9/13. Despite best practices, patient remained hypoxemic and patient was cannulated for VV-ECMO on 9/13 and transferred to [facility] for further management. 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 Showed significant lung improvement and was decannulated 9/21. Extubated 9/22.     NEUROLOGY  Home meds: gabapentin 100g TID  AA&Ox3 at baseline   - following commands w/ delirium likely iso prolonged hospitalization, sedation, and ICU setting  - requiring precedex  for anxiety, stopped 9/22  - seroquel 25 mg qhs when able to take PO meds, monitor qtc  - s/p IV tylenol and low dose morphine IV for c/o generalized pain   - [facility] 9/14 no acute findings  - Palliative following  - Social Work consult  - PT/OT following    PULMONARY  Admitted to [facility] on 8/26 after p/w SOB, found to be hypoxemic, required 2-4L NC/bibap, initially responded well to IV steroids. Interval CTA chest on 9/11 also showed improved in reticular findings and diffuse GGO, then had episode of SVT to 170s (9/12) with rapidly progressive hypoxemic respiratory failure leading to intubation 9/13 required very high PEEP (18) and 100% FiO2 and still had low saturations,  VV ECMO cannula placement 9/13 and was then transferred to [facility] 9/13 for Acute hypoxemic respiratory failure likely DAH/ILD in setting of MCTD. 2/2 bacterial PNA vs atypical PNA vs aspiration vs AEILD continued to show significant improvement from a lung perspective and was decannulated 9/21, and extubated to HFNC 9/22.  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   - 9/20 BAL culture: normal respiratory selvin  - continue duonebs  - s/p empiric abx   - s/p decannulation on 9/21 with plan for suture removal ~7-10 days   - extubated 9/22 to HFNC, post extubation ABG : 7.55/35/116/31/98% on HFNC 60% 40L  - wean HFNC as tolerated     CARDIOVASCULAR  Hx of Afib w at OSH, started on Amiodarone gtt now in shock state requiring requiring pressors likely septic with component of vaspoplegia i/s/o sedation, possible contribution of cardiogenic from RV dysfunction   - No PE seen on invasive pulmonary angiography at time of ECMO cannulation or in recent imaging  - NO2 weaned off  (9/15) RV function remains unchanged  - s/p milrinone, off since (9/13)  - converted to sinus (9/14) discontinued amio gtt iso bradycardia   - back to AF with RVR on 9/19 when sedation weaned off c/w metoprolol 12.5mg BID for rate control and increase as needed   - BP labile, has received hydralazine IVP for hypertensive episodes w/ anxiety likely a contributing factor, however is also requiring low dose Phenylephrine  for intermittent episodes of hypotension that occurs mostly while asleep.  - RITCHIE 9/14 : No MEREDITH thrombus noted, negative for PFO. LVOT diameter of 2 cm  - TTE 9/12 with EF 65-70% with normal LV and RV  - TTE 9/14 with  EF 18%. Severe global LV systolic dysfunction. RV enlargement with decreased RV systolic function, however RITCHIE and recent POCUS shows normal LV systolic function, improved RV systolic function   - TTE 9/19 with recovered EF to 67% but still with RV enlargement and systolic dysfunction    GASTROINTESTINAL  Transaminase elevation likely 2/2 combination of shock liver, malposition of ECMO cannula and liver dysfunction  - ALK/ AST/ALT downtrending but remain elevated 490/136/225, T.bili continues to rise 16.3 today  - Acute hepatitis panel negative  - RUQ US 9/15 shows fatty infiltration of liver, negative for hepatobiliary pathology, repeat RUQ US performed 9/22 for worsening LFT's and rising bili with findings negative as well  - Hepatology consulted - likely shock liver with expected delay in improvement in bilirubin  - Hypertriglyceridemia 836, hx of fatty liver and propofol gtt, s/p insulin gtt, now improving  - Episode of vomiting 9/16, tube feeds held and given reglan x48 hours, restarted TF 9/17 and held 9/22 for extubation  - Last BM noted 9/20  - continue bowel regimen senna and miralax  - CT abdomen 9/15 w/o bowel obstruction, noted with colonic diverticulosis mostly in sigmoid, w/o evidence of diverticulitis  - continue PPI iso steroids   - nutrition following  - failed dysphagia screen, keep NPO pending official swallow eval placed       RENAL  sCr baseline 0.78  - Creatinine wnl  - s/p diuresis with lasix, last administered 9/15, fluid removal via hemo concentrator while on ECMO circuit, will assess daily if lasix is needed   - s/p Bumex 1mg IVP given to optimize post extubation   - good UO with primafit  - hypernatremia improved, free H2O discontinued 9/21      INFECTIOUS DISEASE  Leukocytosis, Tmax 99.6F  - Elevated WBC likely iso filgrastim (initiated 9/17 for leukopenia) as well as s/p ECMO decannulation, however would send cultures if continues to uptrend or febrile  - monitoring off antibiotics for now  - leukopenia now resolved s/p filgrastim (9/17-9/21)  - Bactrim DS discontinued iso leukopenia, continue Mepron for PJP prophylaxis instead  - s/p IV Ceftriaxone 5 days? at [facility] out of an abundance of precaution to cover BAL specimen  - s/p zosyn at OSH (9/12) for lingula PNA  - vancomycin (9/12-9/15 ) d/c'd negative mrsa PCR, and azithro (9/14-9/15) d/c'd neg Urine legionella  - s/p empiric donald (9/13-9/20) now that ANC >1.5  - vanco restarted for ppx on 9/18 d/t leukopenia but stopped on 9/20  - 9/13 and 9/16 urine, sputum and blood cx ngtd  - positive COVID-19 antigen in late August  - f/u BAL, cultures, cytopathology --- so far negative   - ID following- f/u recs      ENDOCRINE  # Hyperglycemia i/s/o steroids  Admission A1c 6.1  - had required insulin gtt but now back on NPH 11 with sliding scale   - elevated triglycerides 835, has hx of fatty liver, had been on propofol gtt. TG downtrending since initiating insulin gtt for hyperglycemia, now normalizing off propofol  - TSH low at 0.13/Free T4 slightly low 0.7, will repeat in a few days  - insulin sliding scale    HEME  # ECMO anticoagulation  - argatroban gtt goal aPTT 50-70 for RIJ deep vein thrombosis, continue until negative, duplex x2  - INR elevated likely iso argatroban and liver dysfxn, s/p Vit. K (9/14) and FFP x1 (9/15) - now resolved  - platelets x 7 for thrombocytopenia <50,000, last transfused  9/21, now improving   - fibrinogen 225,   - Hgb remains stable   - Heme following- f/u recs      #Leukopenia  #Thrombocytopenia  - Heme consult placed for thrombocytopenia, noted to also be leukopenic  - unlikely HLH/MAS since many abnormalities can be explained by severe hypoxemia/hypotension during initial decompensation prior to ecmo cannulation but some features that are consistent and with rheumatic disease it is a possibility to f/u hematology regarding utility of further lab/pathologic testing  - peripheral blood smear reviewed: large platelets noted, no platelet clumps, no blast cells noted, neutrophils noted w/ normal number of lobes, nucleated RBC noted  - acute hepatitis panel negative  - s/p filgrastim 480 daily now d/c'd given ANC >1500 in the setting of possible infxn   - Thrombocytopenia refractory after intermittent transfusions will monitor now off circuit, platelet count improving-> 75 K/ul today    #R/O DVT  - LE duplex negative for DVT   - first VA duplex post decannulation 9/21 shows non-occlusive deep vein thrombosis in the right jugular vein and the right cephalic is non-compressible  - second duplex pending       MSK  Onset of debilitating b/l hand cramps and some muscles weakness x several months, unclear etiology, radiculopathy? vs myositis?   - MRI outpatient reportedly showed cervical spine issues, started on Prednisone shortly before admission at OSH  - myomarker panel +ivon only for RNP  - seen by neurologist at [facility]   - symptoms improved with cellcept (9/8-9/11) but discontinued given Afib RVR   - consider restarting home Gabapentin to assist with neuropathic pain? once more alert and awake  - f/u with Dr. Nik Marie or Dante Urbano for EMG upon discharge (osh?)      RHEUM  - started on Solumedrol 60mg q6h from 9/1 to 9/6 then weaned over time to then Medrol 32 mg 9/9 to 9/12 at [facility]  - s/p Cellcept 9/8 to 9/11 but stopped due to Afib- RVR/tachycardia   - CT findings, Improved/decreased extent of bilateral ground glass opacities and reticular markings compared to the previous study. Findings are nonspecific but differential etiologies include interstitial pneumonia, drug toxicity, nonspecific connective tissue disorders.  - OSH labs show strongly positive ARIANA, RNP, and anti smith antibodies with low C4 and normal C3. Patient with negative SSa and SSb, negative DsDNa, myomarker panel negative (except RNP)  - IL2 receptor elevated 3119.2  - Rheum following  - s/p 1g solumedrol - first dose on (9/13) second dose (9/14) the third and last dose  (9/15) and then solumedrol (1mg/kg) 125mg daily, lowered to 80mg daily (9/19) as per rheum,  will consider taper next week  - s/p plasmapheresis x3  (9/15), (9/18), (9/20) for therapeutic option to rapidly remove autoantibodies and inflammatory factors from plasma d/t severe DAH  - s/p rituximab 9/16/23  - will plan for next rituximab dose ~9/30/23       SKIN  Reportedly had single episode of painful rash on her shins, ears and neck and chest which resolved with a steroid cream from a dermatologist prior to admission  - no evidence of rash currently  - right groin with breakdown s/p cannula placement        PROPHYLAXIS  # DVT: argatroban  # GI: TF      LINES  -Ecmo Cannulas 9/13-9/21  -[facility] TLC- 9/13  -Left Ax [place] - 9/13 (placed at OSH)  -RA 16g PIV x2- 9/15      GOC  -Palliative Following  -full code  -partner Kiara at bedside daily and updated on care

## 2023-09-23 NOTE — PROGRESS NOTE ADULT - SUBJECTIVE AND OBJECTIVE BOX
Interval Events:  - two episodes of HR 190s while turning, broke with 5 mg IV lopressor  - remains on HFNC 60L 40%  - NPO      REVIEW OF SYSTEMS:  Unable to assess ROS because pt is intubated and sedated    Vital Signs Last 24 Hrs  T(C): 36.6 (23 Sep 2023 04:00), Max: 37.1 (22 Sep 2023 12:00)  T(F): 97.9 (23 Sep 2023 04:00), Max: 98.7 (22 Sep 2023 12:00)  HR: 93 (23 Sep 2023 06:00) (69 - 185)  BP: --  BP(mean): --  RR: 28 (23 Sep 2023 06:34) (17 - 39)  SpO2: 94% (23 Sep 2023 06:34) (89% - 100%)      Adult Advanced Hemodynamics Last 24 Hrs  CVP(mm Hg): --  CVP(cm H2O): --  CO: --  CI: --  PA: --  PA(mean): --  PCWP: --  SVR: --  SVRI: --  PVR: --  PVRI: --    I&O's Summary    22 Sep 2023 07:01  -  23 Sep 2023 07:00  --------------------------------------------------------  IN: 467 mL / OUT: 3580 mL / NET: -3113 mL          ECMO SETTINGS:  Type:		[ ] Venovenous		[ ] Venoarterial  Cannulation Site(s):     Flow:  	          P1:                  Delta P:  RPM: 		  P2:                  SVO2:    Oxygenator:	Sweep:     L/min	FiO2:     ABG - ( 23 Sep 2023 00:19 )  pH: 7.51  /  pCO2: 38    /  pO2: 67    / HCO3: 30    / Base Excess: 6.9   /  SaO2: 93.7                  VENTILATOR SETTINGS:           PHYSICAL EXAM:  General: NAD  HEENT: icteric sclera, EOMI  Neck: RIJ ecmo site c/d/i  Respiratory: CTAB  Cardiovascular: RRR  Abdomen: soft, NT, ND  Extremities: wwp  Neurological: no focal deficits        LABS:                          12.1   26.28 )-----------( 104      ( 23 Sep 2023 00:19 )             36.8                          09-23    136  |  99  |  34<H>  ----------------------------<  125<H>  4.3   |  27  |  0.48<L>    Ca    9.4      23 Sep 2023 00:19  Phos  3.6     09-23  Mg     2.30     09-23    TPro  4.9<L>  /  Alb  3.1<L>  /  TBili  16.6<H>  /  DBili  x   /  AST  92<H>  /  ALT  212<H>  /  AlkPhos  488<H>  09-23      LIVER FUNCTIONS - ( 23 Sep 2023 00:19 )  Alb: 3.1 g/dL / Pro: 4.9 g/dL / ALK PHOS: 488 U/L / ALT: 212 U/L / AST: 92 U/L / GGT: x             PT/INR - ( 22 Sep 2023 07:55 )   PT: 19.9 sec;   INR: 1.81 ratio         PTT - ( 23 Sep 2023 00:19 )  PTT:59.9 sec      Culture - Fungal, Bronchial (collected 20 Sep 2023 16:39)  Source: .Bronchial Bronchial Lavage  Preliminary Report (21 Sep 2023 06:33):    Testing in progress    Culture - Acid Fast - Bronchial w/Smear (collected 20 Sep 2023 16:39)  Source: .Bronchial Bronchial Lavage    Culture - Bronchial (collected 20 Sep 2023 16:39)  Source: .Bronchial Bronchial Lavage  Gram Stain (20 Sep 2023 23:41):    No polymorphonuclear cells seen per low power field    No squamous epithelial cells per low power field    Rare Yeast per oil power field  Final Report (22 Sep 2023 23:07):    Normal Respiratory Noreen present          ACTIVE MEDS:    MEDICATIONS  (STANDING):  albuterol/ipratropium for Nebulization 3 milliLiter(s) Nebulizer every 6 hours  argatroban Infusion 0.4 MICROgram(s)/kG/Min (3.06 mL/Hr) IV Continuous <Continuous>  artificial  tears Solution 1 Drop(s) Both EYES every 12 hours  atovaquone  Suspension 1500 milliGRAM(s) Oral daily  chlorhexidine 2% Cloths 1 Application(s) Topical <User Schedule>  dextrose 5%. 1000 milliLiter(s) (50 mL/Hr) IV Continuous <Continuous>  dextrose 50% Injectable 12.5 Gram(s) IV Push once  dextrose 50% Injectable 25 Gram(s) IV Push once  dextrose 50% Injectable 25 Gram(s) IV Push once  folic acid 1 milliGRAM(s) Oral daily  glucagon  Injectable 1 milliGRAM(s) IntraMuscular once  insulin lispro (ADMELOG) corrective regimen sliding scale   SubCutaneous every 6 hours  insulin NPH human recombinant 11 Unit(s) SubCutaneous every 6 hours  methylPREDNISolone sodium succinate Injectable 80 milliGRAM(s) IV Push daily  metoprolol tartrate 12.5 milliGRAM(s) Oral two times a day  multivitamin/minerals/iron Oral Solution (CENTRUM) 15 milliLiter(s) Oral daily  pantoprazole  Injectable 40 milliGRAM(s) IV Push daily  phenylephrine    Infusion 0.499 MICROgram(s)/kG/Min (23.9 mL/Hr) IV Continuous <Continuous>  polyethylene glycol 3350 17 Gram(s) Oral <User Schedule>  QUEtiapine 25 milliGRAM(s) Oral at bedtime  senna Syrup 10 milliLiter(s) Oral two times a day

## 2023-09-24 LAB
ALBUMIN SERPL ELPH-MCNC: 2.9 G/DL — LOW (ref 3.3–5)
ALP SERPL-CCNC: 541 U/L — HIGH (ref 40–120)
ALT FLD-CCNC: 195 U/L — HIGH (ref 4–33)
ANION GAP SERPL CALC-SCNC: 14 MMOL/L — SIGNIFICANT CHANGE UP (ref 7–14)
APTT BLD: 60.9 SEC — HIGH (ref 24.5–35.6)
AST SERPL-CCNC: 114 U/L — HIGH (ref 4–32)
BASOPHILS # BLD AUTO: 0.06 K/UL — SIGNIFICANT CHANGE UP (ref 0–0.2)
BASOPHILS NFR BLD AUTO: 0.3 % — SIGNIFICANT CHANGE UP (ref 0–2)
BILIRUB SERPL-MCNC: 15.8 MG/DL — HIGH (ref 0.2–1.2)
BLOOD GAS ARTERIAL - LYTES,HGB,ICA,LACT RESULT: SIGNIFICANT CHANGE UP
BUN SERPL-MCNC: 40 MG/DL — HIGH (ref 7–23)
CALCIUM SERPL-MCNC: 9.4 MG/DL — SIGNIFICANT CHANGE UP (ref 8.4–10.5)
CHLORIDE SERPL-SCNC: 102 MMOL/L — SIGNIFICANT CHANGE UP (ref 98–107)
CK MB BLD-MCNC: 12.2 % — HIGH (ref 0–2.5)
CK MB CFR SERPL CALC: 6.2 NG/ML — HIGH
CK SERPL-CCNC: 51 U/L — SIGNIFICANT CHANGE UP (ref 25–170)
CO2 SERPL-SCNC: 25 MMOL/L — SIGNIFICANT CHANGE UP (ref 22–31)
CREAT SERPL-MCNC: 0.54 MG/DL — SIGNIFICANT CHANGE UP (ref 0.5–1.3)
EGFR: 103 ML/MIN/1.73M2 — SIGNIFICANT CHANGE UP
EOSINOPHIL # BLD AUTO: 0 K/UL — SIGNIFICANT CHANGE UP (ref 0–0.5)
EOSINOPHIL NFR BLD AUTO: 0 % — SIGNIFICANT CHANGE UP (ref 0–6)
GLUCOSE BLDC GLUCOMTR-MCNC: 103 MG/DL — HIGH (ref 70–99)
GLUCOSE BLDC GLUCOMTR-MCNC: 113 MG/DL — HIGH (ref 70–99)
GLUCOSE BLDC GLUCOMTR-MCNC: 153 MG/DL — HIGH (ref 70–99)
GLUCOSE BLDC GLUCOMTR-MCNC: 82 MG/DL — SIGNIFICANT CHANGE UP (ref 70–99)
GLUCOSE SERPL-MCNC: 120 MG/DL — HIGH (ref 70–99)
HCT VFR BLD CALC: 36.5 % — SIGNIFICANT CHANGE UP (ref 34.5–45)
HGB BLD-MCNC: 11.7 G/DL — SIGNIFICANT CHANGE UP (ref 11.5–15.5)
IANC: 19.06 K/UL — HIGH (ref 1.8–7.4)
IMM GRANULOCYTES NFR BLD AUTO: 5.9 % — HIGH (ref 0–0.9)
INR BLD: 2.15 RATIO — HIGH (ref 0.85–1.18)
LYMPHOCYTES # BLD AUTO: 0.48 K/UL — LOW (ref 1–3.3)
LYMPHOCYTES # BLD AUTO: 2.1 % — LOW (ref 13–44)
MAGNESIUM SERPL-MCNC: 2.4 MG/DL — SIGNIFICANT CHANGE UP (ref 1.6–2.6)
MCHC RBC-ENTMCNC: 30.6 PG — SIGNIFICANT CHANGE UP (ref 27–34)
MCHC RBC-ENTMCNC: 32.1 GM/DL — SIGNIFICANT CHANGE UP (ref 32–36)
MCV RBC AUTO: 95.5 FL — SIGNIFICANT CHANGE UP (ref 80–100)
MONOCYTES # BLD AUTO: 1.74 K/UL — HIGH (ref 0–0.9)
MONOCYTES NFR BLD AUTO: 7.7 % — SIGNIFICANT CHANGE UP (ref 2–14)
NEUTROPHILS # BLD AUTO: 19.06 K/UL — HIGH (ref 1.8–7.4)
NEUTROPHILS NFR BLD AUTO: 84 % — HIGH (ref 43–77)
NRBC # BLD: 0 /100 WBCS — SIGNIFICANT CHANGE UP (ref 0–0)
NRBC # FLD: 0.08 K/UL — HIGH (ref 0–0)
PHOSPHATE SERPL-MCNC: 3.6 MG/DL — SIGNIFICANT CHANGE UP (ref 2.5–4.5)
PLATELET # BLD AUTO: 133 K/UL — LOW (ref 150–400)
POTASSIUM SERPL-MCNC: 4.2 MMOL/L — SIGNIFICANT CHANGE UP (ref 3.5–5.3)
POTASSIUM SERPL-SCNC: 4.2 MMOL/L — SIGNIFICANT CHANGE UP (ref 3.5–5.3)
PROT SERPL-MCNC: 4.8 G/DL — LOW (ref 6–8.3)
PROTHROM AB SERPL-ACNC: 23.6 SEC — HIGH (ref 9.5–13)
RBC # BLD: 3.82 M/UL — SIGNIFICANT CHANGE UP (ref 3.8–5.2)
RBC # FLD: 20.4 % — HIGH (ref 10.3–14.5)
SODIUM SERPL-SCNC: 141 MMOL/L — SIGNIFICANT CHANGE UP (ref 135–145)
TRIGL SERPL-MCNC: 198 MG/DL — HIGH
TROPONIN T, HIGH SENSITIVITY RESULT: 124 NG/L — CRITICAL HIGH
WBC # BLD: 22.68 K/UL — HIGH (ref 3.8–10.5)
WBC # FLD AUTO: 22.68 K/UL — HIGH (ref 3.8–10.5)

## 2023-09-24 PROCEDURE — 99291 CRITICAL CARE FIRST HOUR: CPT

## 2023-09-24 PROCEDURE — 93975 VASCULAR STUDY: CPT | Mod: 26

## 2023-09-24 PROCEDURE — 71045 X-RAY EXAM CHEST 1 VIEW: CPT | Mod: 26

## 2023-09-24 RX ORDER — SENNA PLUS 8.6 MG/1
10 TABLET ORAL AT BEDTIME
Refills: 0 | Status: DISCONTINUED | OUTPATIENT
Start: 2023-09-24 | End: 2023-10-05

## 2023-09-24 RX ORDER — ALBUMIN HUMAN 25 %
250 VIAL (ML) INTRAVENOUS ONCE
Refills: 0 | Status: COMPLETED | OUTPATIENT
Start: 2023-09-24 | End: 2023-09-24

## 2023-09-24 RX ORDER — HUMAN INSULIN 100 [IU]/ML
5 INJECTION, SUSPENSION SUBCUTANEOUS EVERY 6 HOURS
Refills: 0 | Status: DISCONTINUED | OUTPATIENT
Start: 2023-09-24 | End: 2023-09-27

## 2023-09-24 RX ORDER — MIDODRINE HYDROCHLORIDE 2.5 MG/1
10 TABLET ORAL EVERY 8 HOURS
Refills: 0 | Status: DISCONTINUED | OUTPATIENT
Start: 2023-09-24 | End: 2023-09-25

## 2023-09-24 RX ORDER — POLYETHYLENE GLYCOL 3350 17 G/17G
17 POWDER, FOR SOLUTION ORAL DAILY
Refills: 0 | Status: DISCONTINUED | OUTPATIENT
Start: 2023-09-24 | End: 2023-09-25

## 2023-09-24 RX ORDER — METOPROLOL TARTRATE 50 MG
5 TABLET ORAL ONCE
Refills: 0 | Status: COMPLETED | OUTPATIENT
Start: 2023-09-24 | End: 2023-09-24

## 2023-09-24 RX ADMIN — Medication 3 MILLILITER(S): at 22:34

## 2023-09-24 RX ADMIN — QUETIAPINE FUMARATE 25 MILLIGRAM(S): 200 TABLET, FILM COATED ORAL at 21:37

## 2023-09-24 RX ADMIN — CHLORHEXIDINE GLUCONATE 1 APPLICATION(S): 213 SOLUTION TOPICAL at 05:27

## 2023-09-24 RX ADMIN — Medication 1 MILLIGRAM(S): at 12:47

## 2023-09-24 RX ADMIN — Medication 80 MILLIGRAM(S): at 05:26

## 2023-09-24 RX ADMIN — SENNA PLUS 10 MILLILITER(S): 8.6 TABLET ORAL at 21:37

## 2023-09-24 RX ADMIN — Medication 3 MILLILITER(S): at 04:04

## 2023-09-24 RX ADMIN — ATOVAQUONE 1500 MILLIGRAM(S): 750 SUSPENSION ORAL at 12:47

## 2023-09-24 RX ADMIN — Medication 500 MILLILITER(S): at 15:07

## 2023-09-24 RX ADMIN — Medication 15 MILLILITER(S): at 13:20

## 2023-09-24 RX ADMIN — MIDODRINE HYDROCHLORIDE 10 MILLIGRAM(S): 2.5 TABLET ORAL at 21:37

## 2023-09-24 RX ADMIN — PANTOPRAZOLE SODIUM 40 MILLIGRAM(S): 20 TABLET, DELAYED RELEASE ORAL at 12:33

## 2023-09-24 RX ADMIN — Medication 12.5 MILLIGRAM(S): at 17:55

## 2023-09-24 RX ADMIN — Medication 1 DROP(S): at 05:26

## 2023-09-24 RX ADMIN — Medication 1 DROP(S): at 17:55

## 2023-09-24 RX ADMIN — Medication 1: at 12:33

## 2023-09-24 RX ADMIN — Medication 500 MILLILITER(S): at 14:07

## 2023-09-24 RX ADMIN — Medication 3 MILLILITER(S): at 10:22

## 2023-09-24 RX ADMIN — ARGATROBAN 3.06 MICROGRAM(S)/KG/MIN: 50 INJECTION, SOLUTION INTRAVENOUS at 12:38

## 2023-09-24 RX ADMIN — MIDODRINE HYDROCHLORIDE 10 MILLIGRAM(S): 2.5 TABLET ORAL at 13:52

## 2023-09-24 RX ADMIN — Medication 5 MILLIGRAM(S): at 12:34

## 2023-09-24 RX ADMIN — Medication 3 MILLILITER(S): at 15:38

## 2023-09-24 NOTE — PROCEDURE NOTE - NSINFORMCONSENT_GEN_A_CORE
Benefits, risks, and possible complications of procedure explained to patient/caregiver who verbalized understanding and gave verbal consent.
Benefits, risks, and possible complications of procedure explained to patient/caregiver who verbalized understanding and gave written consent.
Benefits, risks, and possible complications of procedure explained to patient/caregiver who verbalized understanding and gave verbal consent.
This was an emergent procedure.

## 2023-09-24 NOTE — PROGRESS NOTE ADULT - SUBJECTIVE AND OBJECTIVE BOX
CHIEF COMPLAINT: Im hungry     Interval Events:    HPI:  62 year old female with a past medical history of afib, and sciatica, who presented to [facility] for shortness of breath. She had gone to [facility] where she had CXR which showed bilateral lower lobe infiltrates so was sent to ER. She had been having exertional dyspnea and hypoxemia (on home pulse ox to 82%). She had been having dyspnea for several months and had a workup in [place] with a CT scan (which was negative for PE). She also states that she was seen by a pulmonologist and rheumatology, where they ruled out lupus and other immunological disorders.    [facility] Hospital Course  Found to be hypoxic and have interstitial lung disease appearance on CT, admitted 8/26 for AHRF, further ILD workup and management. TTE 8/26 with normal LVEF. Pt was started on prednisone on admission with gradually improving O2 requirement and has been stable on 2-3LNC since 9/3. Started steroid taper on 9/6 and fully transitioned to PO 9/8 overnight. Started on Mycophenolate mofetil (cellcept) 9/8 evening but pt reported subjective side effects with weakness. Episode of AF w RVR with HR up to 140s on 9/11 am, decreased to HR 10-110s with IV lopressor 10. CTA negative for PE and showed interval improvements in GGO but possible lingular bleb and RML bronchiectasis. Patient received 2L of but before more fluids and beta-blocker pt developed heart palpitations with EKG HR 170s with SVT. BPs 105/70s. Did not respond to vagal maneuvers. Pt given oral lopressor 12.5 and IV lopressor 5, with improvement of HR to 130s. SBP briefly dropped to 60-70s then recovered. Patient on NRB offered HFNC/BiPAP but refused. Started on empiric vancomycin and zosyn. BCx w/ NGTD. Per pulm increased solumedrol to 40mg qd. Patient acceded to HFNC in which she desatted and presented with increased wob for which she was transitioned to BiPAP. Patient with anxiety while on BiPAP presenting tachypnea and desatting to the 80s despite verbal redirection for which she was administered ativan 1mg IVP x1. Patient distress improved with sats in the low 90s but had desaturation to 70s. STAT CXR showed increased pulmonary congestion for which patient was administered lasix without improvement. Intubated and started on mechanical ventilation but required very high PEEP (18) and 100% FiO2 and still had low saturations. VV ECMO team consulted and accepted to [facility] center. Per signout patient had RV enlargement and dysfunction on POCUS with concern for either new PE vs high pulmonary pressures due to PEEP 18 and lung dysfunction. Patient went for cannulation at [facility] with flouro showing no acute PE. Cannulated with 25 F drainage cannula in R Fem V and 23F “arterial” cannula in RIJ.    (13 Sep 2023 15:24)      REVIEW OF SYSTEMS:  Constitutional: [ ] negative [ ] fevers [ ] chills [ ] weight loss [ ] weight gain  HEENT: [ ] negative [ ] dry eyes [ ] eye irritation [ ] postnasal drip [ ] nasal congestion  CV: [ ] negative  [ ] chest pain [ ] orthopnea [ ] palpitations [ ] murmur  Resp: [ ] negative [ ] cough [ ] shortness of breath [ ] dyspnea [ ] wheezing [ ] sputum [ ] hemoptysis  GI: [ ] negative [ ] nausea [ ] vomiting [ ] diarrhea [ ] constipation [ ] abd pain [ ] dysphagia   : [ ] negative [ ] dysuria [ ] nocturia [ ] hematuria [ ] increased urinary frequency  Musculoskeletal: [ ] negative [ ] back pain [ ] myalgias [ ] arthralgias [ ] fracture  Skin: [ ] negative [ ] rash [ ] itch  Neurological: [ ] negative [ ] headache [ ] dizziness [ ] syncope [ ] weakness [ ] numbness  Psychiatric: [ ] negative [ ] anxiety [ ] depression  Endocrine: [ ] negative [ ] diabetes [ ] thyroid problem  Hematologic/Lymphatic: [ ] negative [ ] anemia [ ] bleeding problem  Allergic/Immunologic: [ ] negative [ ] itchy eyes [ ] nasal discharge [ ] hives [ ] angioedema  [ ] All other systems negative  [ ] Unable to assess ROS because ________    OBJECTIVE:  ICU Vital Signs Last 24 Hrs  T(C): 37.1 (24 Sep 2023 08:00), Max: 37.1 (23 Sep 2023 16:00)  T(F): 98.7 (24 Sep 2023 08:00), Max: 98.7 (23 Sep 2023 16:00)  HR: 90 (24 Sep 2023 08:00) (86 - 101)  BP: 101/54 (24 Sep 2023 03:00) (98/52 - 103/59)  BP(mean): 65 (24 Sep 2023 03:00) (64 - 68)  ABP: 115/62 (24 Sep 2023 08:00) (98/58 - 128/72)  ABP(mean): 83 (24 Sep 2023 08:00) (74 - 95)  RR: 29 (24 Sep 2023 08:00) (20 - 34)  SpO2: 99% (24 Sep 2023 08:00) (92% - 100%)    O2 Parameters below as of 24 Sep 2023 08:00  Patient On (Oxygen Delivery Method): nasal cannula, high flow  O2 Flow (L/min): 40  O2 Concentration (%): 60          09-23 @ 07:01  -  09-24 @ 07:00  --------------------------------------------------------  IN: 74.4 mL / OUT: 2480 mL / NET: -2405.6 mL      CAPILLARY BLOOD GLUCOSE      POCT Blood Glucose.: 82 mg/dL (24 Sep 2023 05:16)      Physical Exam:   Constitutional: ill appearing, no acute distress   HEENT: + PERRLA, EOMI, no drainage or redness  Neck: supple,  No JVD  Respiratory: dimished breath Sounds equal & clear bilaterally to auscultation, no accessory muscle use noted  Cardiovascular: Regular rate, regular rhythm, normal S1, S2; no murmurs or rub  Gastrointestinal: Soft, non-tender, non distended, no hepatosplenomegaly, normal bowel sounds  Extremities: no peripheral edema, no cyanosis, no clubbing   Vascular: Equal and normal pulses: 2+ peripheral pulses throughout  Neurological:   Psychiatric: calm, normal mood, normal affect  Musculoskeletal: No joint swelling or deformity; no limitation of movement  Skin: warm, dry, well perfused, no rashes    HOSPITAL MEDICATIONS:  Standing Meds:  albuterol/ipratropium for Nebulization 3 milliLiter(s) Nebulizer every 6 hours  argatroban Infusion 0.4 MICROgram(s)/kG/Min IV Continuous <Continuous>  artificial  tears Solution 1 Drop(s) Both EYES every 12 hours  atovaquone  Suspension 1500 milliGRAM(s) Oral daily  chlorhexidine 2% Cloths 1 Application(s) Topical <User Schedule>  dextrose 5%. 1000 milliLiter(s) IV Continuous <Continuous>  dextrose 50% Injectable 25 Gram(s) IV Push once  dextrose 50% Injectable 12.5 Gram(s) IV Push once  dextrose 50% Injectable 25 Gram(s) IV Push once  folic acid 1 milliGRAM(s) Oral daily  glucagon  Injectable 1 milliGRAM(s) IntraMuscular once  insulin lispro (ADMELOG) corrective regimen sliding scale   SubCutaneous every 6 hours  insulin NPH human recombinant 11 Unit(s) SubCutaneous every 6 hours  methylPREDNISolone sodium succinate Injectable 80 milliGRAM(s) IV Push daily  metoprolol tartrate 12.5 milliGRAM(s) Oral two times a day  multivitamin/minerals/iron Oral Solution (CENTRUM) 15 milliLiter(s) Oral daily  pantoprazole  Injectable 40 milliGRAM(s) IV Push daily  phenylephrine    Infusion 0.499 MICROgram(s)/kG/Min IV Continuous <Continuous>  polyethylene glycol 3350 17 Gram(s) Oral <User Schedule>  QUEtiapine 25 milliGRAM(s) Oral at bedtime  senna Syrup 10 milliLiter(s) Oral two times a day      PRN Meds:  dextrose Oral Gel 15 Gram(s) Oral once PRN      LABS:                        11.7   22.68 )-----------( 133      ( 24 Sep 2023 03:15 )             36.5     Hgb Trend: 11.7<--, 12.0<--, 12.1<--, 12.9<--, 11.1<--  09-24    141  |  102  |  40<H>  ----------------------------<  120<H>  4.2   |  25  |  0.54    Ca    9.4      24 Sep 2023 03:15  Phos  3.6     09-24  Mg     2.40     09-24    TPro  4.8<L>  /  Alb  2.9<L>  /  TBili  15.8<H>  /  DBili  x   /  AST  114<H>  /  ALT  195<H>  /  AlkPhos  541<H>  09-24    Creatinine Trend: 0.54<--, 0.51<--, 0.48<--, 0.41<--, 0.41<--, 0.36<--  PT/INR - ( 24 Sep 2023 03:15 )   PT: 23.6 sec;   INR: 2.15 ratio         PTT - ( 24 Sep 2023 03:15 )  PTT:60.9 sec  Urinalysis Basic - ( 24 Sep 2023 03:15 )    Color: x / Appearance: x / SG: x / pH: x  Gluc: 120 mg/dL / Ketone: x  / Bili: x / Urobili: x   Blood: x / Protein: x / Nitrite: x   Leuk Esterase: x / RBC: x / WBC x   Sq Epi: x / Non Sq Epi: x / Bacteria: x      Arterial Blood Gas:  09-24 @ 03:15  7.48/39/74/29/95.8/5.2  ABG lactate: --  Arterial Blood Gas:  09-23 @ 22:15  7.49/38/76/29/96.4/5.4  ABG lactate: --  Arterial Blood Gas:  09-23 @ 00:19  7.51/38/67/30/93.7/6.9  ABG lactate: --  Arterial Blood Gas:  09-22 @ 16:52  7.55/32/109/28/98.8/5.8  ABG lactate: --  Arterial Blood Gas:  09-22 @ 11:00  7.55/35/116/31/98.9/8.0  ABG lactate: --             CHIEF COMPLAINT: Im hungry     Interval Events:   -increase in fio2 overnight   -9 beats of v tach, remained hemodynamically stable     HPI:  62 year old female with a past medical history of afib, and sciatica, who presented to [facility] for shortness of breath. She had gone to [facility] where she had CXR which showed bilateral lower lobe infiltrates so was sent to ER. She had been having exertional dyspnea and hypoxemia (on home pulse ox to 82%). She had been having dyspnea for several months and had a workup in [place] with a CT scan (which was negative for PE). She also states that she was seen by a pulmonologist and rheumatology, where they ruled out lupus and other immunological disorders.    [facility] Hospital Course  Found to be hypoxic and have interstitial lung disease appearance on CT, admitted 8/26 for AHRF, further ILD workup and management. TTE 8/26 with normal LVEF. Pt was started on prednisone on admission with gradually improving O2 requirement and has been stable on 2-3LNC since 9/3. Started steroid taper on 9/6 and fully transitioned to PO 9/8 overnight. Started on Mycophenolate mofetil (cellcept) 9/8 evening but pt reported subjective side effects with weakness. Episode of AF w RVR with HR up to 140s on 9/11 am, decreased to HR 10-110s with IV lopressor 10. CTA negative for PE and showed interval improvements in GGO but possible lingular bleb and RML bronchiectasis. Patient received 2L of but before more fluids and beta-blocker pt developed heart palpitations with EKG HR 170s with SVT. BPs 105/70s. Did not respond to vagal maneuvers. Pt given oral lopressor 12.5 and IV lopressor 5, with improvement of HR to 130s. SBP briefly dropped to 60-70s then recovered. Patient on NRB offered HFNC/BiPAP but refused. Started on empiric vancomycin and zosyn. BCx w/ NGTD. Per pulm increased solumedrol to 40mg qd. Patient acceded to HFNC in which she desatted and presented with increased wob for which she was transitioned to BiPAP. Patient with anxiety while on BiPAP presenting tachypnea and desatting to the 80s despite verbal redirection for which she was administered ativan 1mg IVP x1. Patient distress improved with sats in the low 90s but had desaturation to 70s. STAT CXR showed increased pulmonary congestion for which patient was administered lasix without improvement. Intubated and started on mechanical ventilation but required very high PEEP (18) and 100% FiO2 and still had low saturations. VV ECMO team consulted and accepted to [facility] Injury center. Per signout patient had RV enlargement and dysfunction on POCUS with concern for either new PE vs high pulmonary pressures due to PEEP 18 and lung dysfunction. Patient went for cannulation at [facility] with flouro showing no acute PE. Cannulated with 25 F drainage cannula in R Fem V and 23F “arterial” cannula in RIJ.    (13 Sep 2023 15:24)      REVIEW OF SYSTEMS:    [x ] All  systems negative  [ ] Unable to assess ROS because ________    OBJECTIVE:  ICU Vital Signs Last 24 Hrs  T(C): 37.1 (24 Sep 2023 08:00), Max: 37.1 (23 Sep 2023 16:00)  T(F): 98.7 (24 Sep 2023 08:00), Max: 98.7 (23 Sep 2023 16:00)  HR: 90 (24 Sep 2023 08:00) (86 - 101)  BP: 101/54 (24 Sep 2023 03:00) (98/52 - 103/59)  BP(mean): 65 (24 Sep 2023 03:00) (64 - 68)  ABP: 115/62 (24 Sep 2023 08:00) (98/58 - 128/72)  ABP(mean): 83 (24 Sep 2023 08:00) (74 - 95)  RR: 29 (24 Sep 2023 08:00) (20 - 34)  SpO2: 99% (24 Sep 2023 08:00) (92% - 100%)    O2 Parameters below as of 24 Sep 2023 08:00  Patient On (Oxygen Delivery Method): nasal cannula, high flow  O2 Flow (L/min): 40  O2 Concentration (%): 60          09-23 @ 07:01  -  09-24 @ 07:00  --------------------------------------------------------  IN: 74.4 mL / OUT: 2480 mL / NET: -2405.6 mL      CAPILLARY BLOOD GLUCOSE      POCT Blood Glucose.: 82 mg/dL (24 Sep 2023 05:16)      Physical Exam:   Constitutional: ill appearing, no acute distress   HEENT: + PERRLA, EOMI, no drainage or redness  Neck: supple,  No JVD  Respiratory: diminished breath Sounds equal & clear bilaterally to auscultation, no accessory muscle use noted  Cardiovascular: Regular rate, regular rhythm, normal S1, S2; no murmurs or rub  Gastrointestinal: Soft, non-tender, non distended, no hepatosplenomegaly, normal bowel sounds  Extremities: no peripheral edema, no cyanosis, no clubbing   Vascular: Equal and normal pulses: 2+ peripheral pulses throughout  Neurological:   Psychiatric: calm, normal mood, normal affect  Musculoskeletal: No joint swelling or deformity; no limitation of movement  Skin: warm, dry, well perfused, no rashes    HOSPITAL MEDICATIONS:  Standing Meds:  albuterol/ipratropium for Nebulization 3 milliLiter(s) Nebulizer every 6 hours  argatroban Infusion 0.4 MICROgram(s)/kG/Min IV Continuous <Continuous>  artificial  tears Solution 1 Drop(s) Both EYES every 12 hours  atovaquone  Suspension 1500 milliGRAM(s) Oral daily  chlorhexidine 2% Cloths 1 Application(s) Topical <User Schedule>  dextrose 5%. 1000 milliLiter(s) IV Continuous <Continuous>  dextrose 50% Injectable 25 Gram(s) IV Push once  dextrose 50% Injectable 12.5 Gram(s) IV Push once  dextrose 50% Injectable 25 Gram(s) IV Push once  folic acid 1 milliGRAM(s) Oral daily  glucagon  Injectable 1 milliGRAM(s) IntraMuscular once  insulin lispro (ADMELOG) corrective regimen sliding scale   SubCutaneous every 6 hours  insulin NPH human recombinant 11 Unit(s) SubCutaneous every 6 hours  methylPREDNISolone sodium succinate Injectable 80 milliGRAM(s) IV Push daily  metoprolol tartrate 12.5 milliGRAM(s) Oral two times a day  multivitamin/minerals/iron Oral Solution (CENTRUM) 15 milliLiter(s) Oral daily  pantoprazole  Injectable 40 milliGRAM(s) IV Push daily  phenylephrine    Infusion 0.499 MICROgram(s)/kG/Min IV Continuous <Continuous>  polyethylene glycol 3350 17 Gram(s) Oral <User Schedule>  QUEtiapine 25 milliGRAM(s) Oral at bedtime  senna Syrup 10 milliLiter(s) Oral two times a day      PRN Meds:  dextrose Oral Gel 15 Gram(s) Oral once PRN      LABS:                        11.7   22.68 )-----------( 133      ( 24 Sep 2023 03:15 )             36.5     Hgb Trend: 11.7<--, 12.0<--, 12.1<--, 12.9<--, 11.1<--  09-24    141  |  102  |  40<H>  ----------------------------<  120<H>  4.2   |  25  |  0.54    Ca    9.4      24 Sep 2023 03:15  Phos  3.6     09-24  Mg     2.40     09-24    TPro  4.8<L>  /  Alb  2.9<L>  /  TBili  15.8<H>  /  DBili  x   /  AST  114<H>  /  ALT  195<H>  /  AlkPhos  541<H>  09-24    Creatinine Trend: 0.54<--, 0.51<--, 0.48<--, 0.41<--, 0.41<--, 0.36<--  PT/INR - ( 24 Sep 2023 03:15 )   PT: 23.6 sec;   INR: 2.15 ratio         PTT - ( 24 Sep 2023 03:15 )  PTT:60.9 sec  Urinalysis Basic - ( 24 Sep 2023 03:15 )    Color: x / Appearance: x / SG: x / pH: x  Gluc: 120 mg/dL / Ketone: x  / Bili: x / Urobili: x   Blood: x / Protein: x / Nitrite: x   Leuk Esterase: x / RBC: x / WBC x   Sq Epi: x / Non Sq Epi: x / Bacteria: x      Arterial Blood Gas:  09-24 @ 03:15  7.48/39/74/29/95.8/5.2  ABG lactate: --  Arterial Blood Gas:  09-23 @ 22:15  7.49/38/76/29/96.4/5.4  ABG lactate: --  Arterial Blood Gas:  09-23 @ 00:19  7.51/38/67/30/93.7/6.9  ABG lactate: --  Arterial Blood Gas:  09-22 @ 16:52  7.55/32/109/28/98.8/5.8  ABG lactate: --  Arterial Blood Gas:  09-22 @ 11:00  7.55/35/116/31/98.9/8.0  ABG lactate: --

## 2023-09-24 NOTE — PROCEDURE NOTE - NSSITEPREP_SKIN_A_CORE
chlorhexidine/Adherence to aseptic technique: hand hygiene prior to donning barriers (gown, gloves), don cap and mask, sterile drape over patient
Adherence to aseptic technique: hand hygiene prior to donning barriers (gown, gloves), don cap and mask, sterile drape over patient

## 2023-09-24 NOTE — PROGRESS NOTE ADULT - ASSESSMENT
63 yo F w/ Afib initially presented to urgent care for SOB where she had an XR done concerning for b/l lower infiltrates, sent to [facility] ED and admitted to  on 8/26 after being found to be hypoxemic, reported having  debilitating b/l hand numbness/tingling and some muscles weakness several months. She was found to have interstitial lung disease appearance on CT, with the plan for further ILD workup and management, started on prednisone on admission with gradually improving O2 requirements and stable on 2-3LNC. She underwent bronchoscopy with TBBX on 8/30 which showed Non-Specific Intersitial Disease (NSIP)/OP. Labs notable for positive ARIANA 1:1280, RNP > 8, Alejandro > 8. Patient continued on steroids and received cellcept, which was discontinued 2/2 Afib-RVR. Interval CTA chest on 9/11 also negative PE did show possible lingula pneumonia. Started on empiric vancomycin and zosyn 9/12, course was then c/b episode of SVT to 170s w/ rapidly progressive hypoxemic respiratory failure, placed NRB offered HFNC/BiPAP but refused, leading to worsening hypoxemic respiratory failure requiring intubation on 9/13. Despite best practices, patient remained hypoxemic and patient was cannulated for VV-ECMO on 9/13 and transferred to [facility] for further management. 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 Showed significant lung improvement and was decannulated 9/21. Extubated 9/22.     NEUROLOGY  Home meds: gabapentin 100g TID  AA&Ox3 at baseline   - following commands w/ delirium likely iso prolonged hospitalization, sedation, and ICU setting  - requiring precedex  for anxiety, stopped 9/22  - seroquel 25 mg qhs when able to take PO meds, monitor qtc  - s/p IV tylenol and low dose morphine IV for c/o generalized pain   - [facility] 9/14 no acute findings  - Palliative following  - Social Work consult  - PT/OT following    PULMONARY  Admitted to [facility] on 8/26 after p/w SOB, found to be hypoxemic, required 2-4L NC/bibap, initially responded well to IV steroids. 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 2/2 bacterial PNA vs atypical PNA vs aspiration vs AEILD continued to show significant improvement from a lung perspective and was decannulated 9/21, and extubated to HFNC 9/22.  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   - 9/20 BAL culture: normal respiratory selvin  - continue duonebs  - s/p empiric abx   - s/p decannulation on 9/21 with plan for suture removal ~7-10 days   - extubated 9/22 to HFNC, post extubation ABG : 7.55/35/116/31/98% on HFNC 60% 40L  - wean HFNC as tolerated     CARDIOVASCULAR  Hx of Afib w at OSH, started on Amiodarone gtt now in shock state requiring requiring pressors likely septic with component of vaspoplegia i/s/o sedation, possible contribution of cardiogenic from RV dysfunction   - No PE seen on invasive pulmonary angiography at time of ECMO cannulation or in recent imaging  - NO2 weaned off  (9/15) RV function remains unchanged  - s/p milrinone, off since (9/13)  - converted to sinus (9/14) discontinued amio gtt iso bradycardia   - back to AF with RVR on 9/19 when sedation weaned off c/w metoprolol 12.5mg BID for rate control and increase as needed   - BP labile, has received hydralazine IVP for hypertensive episodes w/ anxiety likely a contributing factor, however is also requiring low dose Phenylephrine  for intermittent episodes of hypotension that occurs mostly while asleep.  - RITCHIE 9/14 : No MEREDITH thrombus noted, negative for PFO. LVOT diameter of 2 cm  - TTE 9/12 with EF 65-70% with normal LV and RV  - TTE 9/14 with  EF 18%. Severe global LV systolic dysfunction. RV enlargement with decreased RV systolic function, however RITCHIE and recent POCUS shows normal LV systolic function, improved RV systolic function   - TTE 9/19 with recovered EF to 67% but still with RV enlargement and systolic dysfunction    GASTROINTESTINAL  Transaminase elevation likely 2/2 combination of shock liver, malposition of ECMO cannula and liver dysfunction  - ALK/ AST/ALT downtrending but remain elevated 490/136/225, T.bili continues to rise 16.3 today  - Acute hepatitis panel negative  - RUQ US 9/15 shows fatty infiltration of liver, negative for hepatobiliary pathology, repeat RUQ US performed 9/22 for worsening LFT's and rising bili with findings negative as well  - Hepatology consulted - likely shock liver with expected delay in improvement in bilirubin  - Hypertriglyceridemia 836, hx of fatty liver and propofol gtt, s/p insulin gtt, now improving  - Episode of vomiting 9/16, tube feeds held and given reglan x48 hours, restarted TF 9/17 and held 9/22 for extubation  - Last BM noted 9/20  - continue bowel regimen senna and miralax  - CT abdomen 9/15 w/o bowel obstruction, noted with colonic diverticulosis mostly in sigmoid, w/o evidence of diverticulitis  - continue PPI iso steroids   - nutrition following  - failed dysphagia screen, keep NPO pending official swallow eval placed       RENAL  sCr baseline 0.78  - Creatinine wnl  - s/p diuresis with lasix, last administered 9/15, fluid removal via hemo concentrator while on ECMO circuit, will assess daily if lasix is needed   - s/p Bumex 1mg IVP given to optimize post extubation   - good UO with primafit  - hypernatremia improved, free H2O discontinued 9/21      INFECTIOUS DISEASE  Leukocytosis, Tmax 99.6F  - Elevated WBC likely iso filgrastim (initiated 9/17 for leukopenia) as well as s/p ECMO decannulation, however would send cultures if continues to uptrend or febrile  - monitoring off antibiotics for now  - leukopenia now resolved s/p filgrastim (9/17-9/21)  - Bactrim DS discontinued iso leukopenia, continue Mepron for PJP prophylaxis instead  - s/p IV Ceftriaxone 5 days? at [facility] out of an abundance of precaution to cover BAL specimen  - s/p zosyn at OSH (9/12) for lingula PNA  - vancomycin (9/12-9/15 ) d/c'd negative mrsa PCR, and azithro (9/14-9/15) d/c'd neg Urine legionella  - s/p empiric donald (9/13-9/20) now that ANC >1.5  - vanco restarted for ppx on 9/18 d/t leukopenia but stopped on 9/20  - 9/13 and 9/16 urine, sputum and blood cx ngtd  - positive COVID-19 antigen in late August  - f/u BAL, cultures, cytopathology --- so far negative   - ID following- f/u recs      ENDOCRINE  # Hyperglycemia i/s/o steroids  Admission A1c 6.1  - had required insulin gtt but now back on NPH 11 with sliding scale   - elevated triglycerides 835, has hx of fatty liver, had been on propofol gtt. TG downtrending since initiating insulin gtt for hyperglycemia, now normalizing off propofol  - TSH low at 0.13/Free T4 slightly low 0.7, will repeat in a few days  - insulin sliding scale    HEME  # ECMO anticoagulation  - argatroban gtt goal aPTT 50-70 for RIJ deep vein thrombosis, continue until negative, duplex x2  - INR elevated likely iso argatroban and liver dysfxn, s/p Vit. K (9/14) and FFP x1 (9/15) - now resolved  - platelets x 7 for thrombocytopenia <50,000, last transfused  9/21, now improving   - fibrinogen 225,   - Hgb remains stable   - Heme following- f/u recs      #Leukopenia  #Thrombocytopenia  - Heme consult placed for thrombocytopenia, noted to also be leukopenic  - unlikely HLH/MAS since many abnormalities can be explained by severe hypoxemia/hypotension during initial decompensation prior to ecmo cannulation but some features that are consistent and with rheumatic disease it is a possibility to f/u hematology regarding utility of further lab/pathologic testing  - peripheral blood smear reviewed: large platelets noted, no platelet clumps, no blast cells noted, neutrophils noted w/ normal number of lobes, nucleated RBC noted  - acute hepatitis panel negative  - s/p filgrastim 480 daily now d/c'd given ANC >1500 in the setting of possible infxn   - Thrombocytopenia refractory after intermittent transfusions will monitor now off circuit, platelet count improving-> 75 K/ul today    #R/O DVT  - LE duplex negative for DVT   - first VA duplex post decannulation 9/21 shows non-occlusive deep vein thrombosis in the right jugular vein and the right cephalic is non-compressible  - second duplex pending       MSK  Onset of debilitating b/l hand cramps and some muscles weakness x several months, unclear etiology, radiculopathy? vs myositis?   - MRI outpatient reportedly showed cervical spine issues, started on Prednisone shortly before admission at OSH  - myomarker panel +ivon only for RNP  - seen by neurologist at [facility]   - symptoms improved with cellcept (9/8-9/11) but discontinued given Afib RVR   - consider restarting home Gabapentin to assist with neuropathic pain? once more alert and awake  - f/u with Dr. Nik Marie or Dante Urbano for EMG upon discharge (osh?)      RHEUM  - started on Solumedrol 60mg q6h from 9/1 to 9/6 then weaned over time to then Medrol 32 mg 9/9 to 9/12 at [facility]  - s/p Cellcept 9/8 to 9/11 but stopped due to Afib- RVR/tachycardia   - CT findings, Improved/decreased extent of bilateral ground glass opacities and reticular markings compared to the previous study. Findings are nonspecific but differential etiologies include interstitial pneumonia, drug toxicity, nonspecific connective tissue disorders.  - OSH labs show strongly positive ARIANA, RNP, and anti smith antibodies with low C4 and normal C3. Patient with negative SSa and SSb, negative DsDNa, myomarker panel negative (except RNP)  - IL2 receptor elevated 3119.2  - Rheum following  - s/p 1g solumedrol - first dose on (9/13) second dose (9/14) the third and last dose  (9/15) and then solumedrol (1mg/kg) 125mg daily, lowered to 80mg daily (9/19) as per rheum,  will consider taper next week  - s/p plasmapheresis x3  (9/15), (9/18), (9/20) for therapeutic option to rapidly remove autoantibodies and inflammatory factors from plasma d/t severe DAH  - s/p rituximab 9/16/23  - will plan for next rituximab dose ~9/30/23       SKIN  Reportedly had single episode of painful rash on her shins, ears and neck and chest which resolved with a steroid cream from a dermatologist prior to admission  - no evidence of rash currently  - right groin with breakdown s/p cannula placement        PROPHYLAXIS  # DVT: argatroban  # GI: TF      LINES  -Ecmo Cannulas 9/13-9/21  -[facility] TLC- 9/13  -Left Ax [place] - 9/13 (placed at OSH)  -RA 16g PIV x2- 9/15      GOC  -Palliative Following  -full code  -partner Kiara at bedside daily and updated on care    61 yo F w/ Afib initially presented to urgent care for SOB where she had an XR done concerning for b/l lower infiltrates, sent to [facility] ED and admitted to  on 8/26 after being found to be hypoxemic, reported having  debilitating b/l hand numbness/tingling and some muscles weakness several months. She was found to have interstitial lung disease appearance on CT, with the plan for further ILD workup and management, started on prednisone on admission with gradually improving O2 requirements and stable on 2-3LNC. She underwent bronchoscopy with TBBX on 8/30 which showed Non-Specific Intersitial Disease (NSIP)/OP. Labs notable for positive ARIANA 1:1280, RNP > 8, Alejandro > 8. Patient continued on steroids and received cellcept, which was discontinued 2/2 Afib-RVR. Interval CTA chest on 9/11 also negative PE did show possible lingula pneumonia. Started on empiric vancomycin and zosyn 9/12, course was then c/b episode of SVT to 170s w/ rapidly progressive hypoxemic respiratory failure, placed NRB offered HFNC/BiPAP but refused, leading to worsening hypoxemic respiratory failure requiring intubation on 9/13. Despite best practices, patient remained hypoxemic and patient was cannulated for VV-ECMO on 9/13 and transferred to [facility] for further management. 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 Showed significant lung improvement and was decannulated 9/21. Extubated 9/22.     NEUROLOGY  Home meds: gabapentin 100g TID  AA&Ox3 at baseline   - following commands w/ delirium likely iso prolonged hospitalization, sedation, and ICU setting  - requiring precedex  for anxiety, stopped 9/22  -will start seroquel 25 mg qhs monitor qtc and increase if needed   - [facility] 9/14 no acute findings  - Palliative following  - Social Work consult  - PT/OT following    PULMONARY  Admitted to [facility] on 8/26 after p/w SOB, found to be hypoxemic, required 2-4L NC/bibap, initially responded well to IV steroids. 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 2/2 bacterial PNA vs atypical PNA vs aspiration vs AEILD continued to show significant improvement from a lung perspective and was decannulated 9/21, and extubated to HFNC 9/22.  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   - 9/20 BAL culture: normal respiratory selvin  - continue duonebs  - s/p empiric abx   - s/p decannulation on 9/21 with plan for suture removal ~7-10 days   - extubated 9/22 to HFNC   - wean HFNC as tolerated currently on 40L/40%     CARDIOVASCULAR  Hx of Afib w at OSH, started on Amiodarone gtt now in shock state requiring requiring pressors likely septic with component of vaspoplegia i/s/o sedation, possible contribution of cardiogenic from RV dysfunction. Now having episodes of SVT responsive to lopressor   - No PE seen on invasive pulmonary angiography at time of ECMO cannulation or in recent imaging  - NO2 weaned off  (9/15) RV function remains unchanged  - s/p milrinone, off since (9/13)  - converted to sinus (9/14) discontinued amio gtt iso bradycardia   - back to AF with RVR on 9/19 when sedation weaned off c/w metoprolol 12.5mg BID for rate control and increase as needed   - BP labile, has received hydralazine IVP for hypertensive episodes w/ anxiety likely a contributing factor, however is also requiring low dose Phenylephrine  for intermittent episodes of hypotension that occurs mostly while asleep.  -currently off marianne since 9/23 but BP remains soft will start midodrine 10mg TID and give albumin 250cc x2 for small IVC   - RITCHIE 9/14 : No MEREDITH thrombus noted, negative for PFO. LVOT diameter of 2 cm  - TTE 9/12 with EF 65-70% with normal LV and RV  - TTE 9/14 with  EF 18%. Severe global LV systolic dysfunction. RV enlargement with decreased RV systolic function, however RITCHIE and recent POCUS shows normal LV systolic function, improved RV systolic function   - TTE 9/19 with recovered EF to 67% but still with RV enlargement and systolic dysfunction    GASTROINTESTINAL  Transaminase elevation likely 2/2 combination of shock liver, malposition of ECMO cannula and liver dysfunction  - ALK/ AST/ALT downtrending but remain elevated 490/136/225, T.bili continues to rise 16.3 today  - Acute hepatitis panel negative  - RUQ US 9/15 shows fatty infiltration of liver, negative for hepatobiliary pathology, repeat RUQ US performed 9/22 for worsening LFT's and rising bili with findings negative as well  - Hepatology consulted - likely shock liver with expected delay in improvement in bilirubin  - Hypertriglyceridemia 836, hx of fatty liver and propofol gtt, s/p insulin gtt, now improving  - Last BM noted 9/24  - continue bowel regimen senna and miralax  - CT abdomen 9/15 w/o bowel obstruction, noted with colonic diverticulosis mostly in sigmoid, w/o evidence of diverticulitis  - continue PPI iso steroids   - nutrition following  - failed dysphagia screen, will place KFT and restart tube feeds and reassess speech and swallow     RENAL  sCr baseline 0.78  - Creatinine wnl  - s/p diuresis with lasix, last administered 9/15, fluid removal via hemo concentrator while on ECMO circuit, will assess daily if lasix is needed   - s/p Bumex 1mg IVP given to optimize post extubation   - good UO with primafit  - hypernatremia improved, free H2O discontinued 9/21  -IVC small with BP 80-90's will give albumin 250cc x2       INFECTIOUS DISEASE  Leukocytosis  - Elevated WBC likely iso filgrastim (initiated 9/17 for leukopenia) as well as s/p ECMO decannulation, however would send cultures if continues to uptrend or febrile  - monitoring off antibiotics for now  - leukopenia now resolved s/p filgrastim (9/17-9/21)  - Bactrim DS discontinued iso leukopenia, continue Mepron for PJP prophylaxis instead  - s/p IV Ceftriaxone 5 days? at [facility] out of an abundance of precaution to cover BAL specimen  - s/p zosyn at OSH (9/12) for lingula PNA  - vancomycin (9/12-9/15 ) d/c'd negative mrsa PCR, and azithro (9/14-9/15) d/c'd neg Urine legionella  - s/p empiric donald (9/13-9/20) now that ANC >1.5  - vanco restarted for ppx on 9/18 d/t leukopenia but stopped on 9/20  - 9/13 and 9/16 urine, sputum and blood cx ngtd  - positive COVID-19 antigen in late August  - f/u BAL, cultures, cytopathology --- so far negative   - ID following- f/u recs      ENDOCRINE  # Hyperglycemia i/s/o steroids  Admission A1c 6.1  - had required insulin gtt currently on NPH 11 with sliding scale will decrease NPH to 5u units and watch glucose closely   - elevated triglycerides 835, has hx of fatty liver, had been on propofol gtt. TG downtrending since initiating insulin gtt for hyperglycemia, now normalizing off propofol  - TSH low at 0.13/Free T4 slightly low 0.7, will repeat in a few days  - insulin sliding scale    HEME  #Right IJ thrombus   - argatroban gtt goal aPTT 50-70   - INR elevated likely iso argatroban and liver dysfxn, s/p Vit. K (9/14) and FFP x1 (9/15) - now resolved  - platelets x 7 for thrombocytopenia <50,000, last transfused  9/21 continues to improve  - fibrinogen 225,   - Hgb remains stable   - Heme following- f/u recs      #Leukopenia  #Thrombocytopenia  - Heme consult placed for thrombocytopenia, noted to also be leukopenic  - unlikely HLH/MAS since many abnormalities can be explained by severe hypoxemia/hypotension during initial decompensation prior to ecmo cannulation but some features that are consistent and with rheumatic disease it is a possibility to f/u hematology regarding utility of further lab/pathologic testing  - peripheral blood smear reviewed: large platelets noted, no platelet clumps, no blast cells noted, neutrophils noted w/ normal number of lobes, nucleated RBC noted  - acute hepatitis panel negative  - s/p filgrastim 480 daily  given ANC >1500 in the setting of possible infxn   - Thrombocytopenia refractory after intermittent transfusions will monitor now off circuit, platelet count continues to improve    #R/O DVT  - LE duplex negative for DVT   - first VA duplex post decannulation 9/21 shows non-occlusive deep vein thrombosis in the right jugular vein and the right cephalic is non-compressible  - second duplex pending       MSK  Onset of debilitating b/l hand cramps and some muscles weakness x several months, unclear etiology, radiculopathy? vs myositis?   - MRI outpatient reportedly showed cervical spine issues, started on Prednisone shortly before admission at OSH  - myomarker panel + for RNP and Ku  - seen by neurologist at [facility]   - symptoms improved with cellcept (9/8-9/11) but discontinued given Afib RVR   - consider restarting home Gabapentin to assist with neuropathic pain? once more alert and awake  - f/u with Dr. Nik Marie or Dante Urbano for EMG upon discharge (osh?)      RHEUM  - started on Solumedrol 60mg q6h from 9/1 to 9/6 then weaned over time to then Medrol 32 mg 9/9 to 9/12 at [facility]  - s/p Cellcept 9/8 to 9/11 but stopped due to Afib- RVR/tachycardia   - CT findings, Improved/decreased extent of bilateral ground glass opacities and reticular markings compared to the previous study. Findings are nonspecific but differential etiologies include interstitial pneumonia, drug toxicity, nonspecific connective tissue disorders.  - OSH labs show strongly positive ARIANA, RNP, and anti smith antibodies with low C4 and normal C3. Patient with negative SSa and SSb, negative DsDNa, myomarker panel negative (except RNP)  - IL2 receptor elevated 3119.2  - Rheum following  - s/p 1g solumedrol - first dose on (9/13) second dose (9/14) the third and last dose  (9/15) and then solumedrol (1mg/kg) 125mg daily, lowered to 80mg daily (9/19) as per rheum, will consider taper this week  - s/p plasmapheresis x3  (9/15), (9/18), (9/20) for therapeutic option to rapidly remove autoantibodies and inflammatory factors from plasma d/t severe DAH  - s/p rituximab 9/16/23  - will plan for next rituximab dose ~9/30/23       SKIN  Reportedly had single episode of painful rash on her shins, ears and neck and chest which resolved with a steroid cream from a dermatologist prior to admission  - no evidence of rash currently  - right groin with breakdown s/p cannula placement        PROPHYLAXIS  # DVT: argatroban  # GI: TF      LINES  -Ecmo Cannulas 9/13-9/21  -[facility] TLC- 9/13  -Left Ax [place] - 9/13 (placed at OSH)  -RA 16g PIV x2- 9/15      GO  -Palliative Following  -full code  -partner Kiara at bedside daily and updated on care

## 2023-09-25 LAB
ALBUMIN SERPL ELPH-MCNC: 3.2 G/DL — LOW (ref 3.3–5)
ALP SERPL-CCNC: 536 U/L — HIGH (ref 40–120)
ALT FLD-CCNC: 176 U/L — HIGH (ref 4–33)
ANION GAP SERPL CALC-SCNC: 13 MMOL/L — SIGNIFICANT CHANGE UP (ref 7–14)
APTT BLD: 57.7 SEC — HIGH (ref 24.5–35.6)
AST SERPL-CCNC: 120 U/L — HIGH (ref 4–32)
BASOPHILS # BLD AUTO: 0.13 K/UL — SIGNIFICANT CHANGE UP (ref 0–0.2)
BASOPHILS NFR BLD AUTO: 0.7 % — SIGNIFICANT CHANGE UP (ref 0–2)
BILIRUB SERPL-MCNC: 15 MG/DL — HIGH (ref 0.2–1.2)
BLOOD GAS ARTERIAL - LYTES,HGB,ICA,LACT RESULT: SIGNIFICANT CHANGE UP
BLOOD GAS ARTERIAL - LYTES,HGB,ICA,LACT RESULT: SIGNIFICANT CHANGE UP
BUN SERPL-MCNC: 40 MG/DL — HIGH (ref 7–23)
CALCIUM SERPL-MCNC: 9.7 MG/DL — SIGNIFICANT CHANGE UP (ref 8.4–10.5)
CHLORIDE SERPL-SCNC: 104 MMOL/L — SIGNIFICANT CHANGE UP (ref 98–107)
CO2 SERPL-SCNC: 25 MMOL/L — SIGNIFICANT CHANGE UP (ref 22–31)
CREAT SERPL-MCNC: 0.5 MG/DL — SIGNIFICANT CHANGE UP (ref 0.5–1.3)
EGFR: 105 ML/MIN/1.73M2 — SIGNIFICANT CHANGE UP
EOSINOPHIL # BLD AUTO: 0 K/UL — SIGNIFICANT CHANGE UP (ref 0–0.5)
EOSINOPHIL NFR BLD AUTO: 0 % — SIGNIFICANT CHANGE UP (ref 0–6)
GLUCOSE BLDC GLUCOMTR-MCNC: 143 MG/DL — HIGH (ref 70–99)
GLUCOSE BLDC GLUCOMTR-MCNC: 144 MG/DL — HIGH (ref 70–99)
GLUCOSE BLDC GLUCOMTR-MCNC: 146 MG/DL — HIGH (ref 70–99)
GLUCOSE BLDC GLUCOMTR-MCNC: 254 MG/DL — HIGH (ref 70–99)
GLUCOSE SERPL-MCNC: 160 MG/DL — HIGH (ref 70–99)
HCT VFR BLD CALC: 32.2 % — LOW (ref 34.5–45)
HGB BLD-MCNC: 10.4 G/DL — LOW (ref 11.5–15.5)
IANC: 15.59 K/UL — HIGH (ref 1.8–7.4)
IMM GRANULOCYTES NFR BLD AUTO: 6.1 % — HIGH (ref 0–0.9)
INR BLD: 2.34 RATIO — HIGH (ref 0.85–1.18)
LYMPHOCYTES # BLD AUTO: 0.42 K/UL — LOW (ref 1–3.3)
LYMPHOCYTES # BLD AUTO: 2.3 % — LOW (ref 13–44)
MAGNESIUM SERPL-MCNC: 2.4 MG/DL — SIGNIFICANT CHANGE UP (ref 1.6–2.6)
MCHC RBC-ENTMCNC: 30.9 PG — SIGNIFICANT CHANGE UP (ref 27–34)
MCHC RBC-ENTMCNC: 32.3 GM/DL — SIGNIFICANT CHANGE UP (ref 32–36)
MCV RBC AUTO: 95.5 FL — SIGNIFICANT CHANGE UP (ref 80–100)
MONOCYTES # BLD AUTO: 1.11 K/UL — HIGH (ref 0–0.9)
MONOCYTES NFR BLD AUTO: 6 % — SIGNIFICANT CHANGE UP (ref 2–14)
MRSA PCR RESULT.: SIGNIFICANT CHANGE UP
NEUTROPHILS # BLD AUTO: 15.59 K/UL — HIGH (ref 1.8–7.4)
NEUTROPHILS NFR BLD AUTO: 84.9 % — HIGH (ref 43–77)
NRBC # BLD: 0 /100 WBCS — SIGNIFICANT CHANGE UP (ref 0–0)
NRBC # FLD: 0.03 K/UL — HIGH (ref 0–0)
PHOSPHATE SERPL-MCNC: 3 MG/DL — SIGNIFICANT CHANGE UP (ref 2.5–4.5)
PLATELET # BLD AUTO: 143 K/UL — LOW (ref 150–400)
POTASSIUM SERPL-MCNC: 4.2 MMOL/L — SIGNIFICANT CHANGE UP (ref 3.5–5.3)
POTASSIUM SERPL-SCNC: 4.2 MMOL/L — SIGNIFICANT CHANGE UP (ref 3.5–5.3)
PROT SERPL-MCNC: 4.7 G/DL — LOW (ref 6–8.3)
PROTHROM AB SERPL-ACNC: 25.6 SEC — HIGH (ref 9.5–13)
RBC # BLD: 3.37 M/UL — LOW (ref 3.8–5.2)
RBC # FLD: 20.7 % — HIGH (ref 10.3–14.5)
S AUREUS DNA NOSE QL NAA+PROBE: SIGNIFICANT CHANGE UP
SODIUM SERPL-SCNC: 142 MMOL/L — SIGNIFICANT CHANGE UP (ref 135–145)
TRIGL SERPL-MCNC: 199 MG/DL — HIGH
TROPONIN T, HIGH SENSITIVITY RESULT: 138 NG/L — CRITICAL HIGH
WBC # BLD: 18.38 K/UL — HIGH (ref 3.8–10.5)
WBC # FLD AUTO: 18.38 K/UL — HIGH (ref 3.8–10.5)

## 2023-09-25 PROCEDURE — 99291 CRITICAL CARE FIRST HOUR: CPT | Mod: 25

## 2023-09-25 PROCEDURE — 99232 SBSQ HOSP IP/OBS MODERATE 35: CPT | Mod: GC

## 2023-09-25 PROCEDURE — 93308 TTE F-UP OR LMTD: CPT | Mod: 26,GC

## 2023-09-25 RX ORDER — ENOXAPARIN SODIUM 100 MG/ML
125 INJECTION SUBCUTANEOUS EVERY 12 HOURS
Refills: 0 | Status: DISCONTINUED | OUTPATIENT
Start: 2023-09-25 | End: 2023-09-25

## 2023-09-25 RX ORDER — METOPROLOL TARTRATE 50 MG
25 TABLET ORAL
Refills: 0 | Status: DISCONTINUED | OUTPATIENT
Start: 2023-09-25 | End: 2023-10-05

## 2023-09-25 RX ORDER — MIDODRINE HYDROCHLORIDE 2.5 MG/1
20 TABLET ORAL EVERY 8 HOURS
Refills: 0 | Status: DISCONTINUED | OUTPATIENT
Start: 2023-09-25 | End: 2023-10-02

## 2023-09-25 RX ORDER — ENOXAPARIN SODIUM 100 MG/ML
120 INJECTION SUBCUTANEOUS EVERY 12 HOURS
Refills: 0 | Status: DISCONTINUED | OUTPATIENT
Start: 2023-09-25 | End: 2023-09-27

## 2023-09-25 RX ORDER — METOPROLOL TARTRATE 50 MG
5 TABLET ORAL ONCE
Refills: 0 | Status: COMPLETED | OUTPATIENT
Start: 2023-09-25 | End: 2023-09-25

## 2023-09-25 RX ORDER — QUETIAPINE FUMARATE 200 MG/1
12.5 TABLET, FILM COATED ORAL AT BEDTIME
Refills: 0 | Status: DISCONTINUED | OUTPATIENT
Start: 2023-09-25 | End: 2023-09-25

## 2023-09-25 RX ADMIN — Medication 1 MILLIGRAM(S): at 11:40

## 2023-09-25 RX ADMIN — Medication 1 DROP(S): at 17:13

## 2023-09-25 RX ADMIN — PHENYLEPHRINE HYDROCHLORIDE 23.9 MICROGRAM(S)/KG/MIN: 10 INJECTION INTRAVENOUS at 07:22

## 2023-09-25 RX ADMIN — ENOXAPARIN SODIUM 120 MILLIGRAM(S): 100 INJECTION SUBCUTANEOUS at 22:27

## 2023-09-25 RX ADMIN — Medication 3 MILLILITER(S): at 22:12

## 2023-09-25 RX ADMIN — Medication 5 MILLIGRAM(S): at 08:08

## 2023-09-25 RX ADMIN — Medication 1 DROP(S): at 05:25

## 2023-09-25 RX ADMIN — POLYETHYLENE GLYCOL 3350 17 GRAM(S): 17 POWDER, FOR SOLUTION ORAL at 11:41

## 2023-09-25 RX ADMIN — ARGATROBAN 3.06 MICROGRAM(S)/KG/MIN: 50 INJECTION, SOLUTION INTRAVENOUS at 07:22

## 2023-09-25 RX ADMIN — CHLORHEXIDINE GLUCONATE 1 APPLICATION(S): 213 SOLUTION TOPICAL at 05:26

## 2023-09-25 RX ADMIN — Medication 5 MILLIGRAM(S): at 00:30

## 2023-09-25 RX ADMIN — HUMAN INSULIN 5 UNIT(S): 100 INJECTION, SUSPENSION SUBCUTANEOUS at 11:40

## 2023-09-25 RX ADMIN — MIDODRINE HYDROCHLORIDE 10 MILLIGRAM(S): 2.5 TABLET ORAL at 05:25

## 2023-09-25 RX ADMIN — ENOXAPARIN SODIUM 120 MILLIGRAM(S): 100 INJECTION SUBCUTANEOUS at 11:38

## 2023-09-25 RX ADMIN — Medication 25 MILLIGRAM(S): at 17:14

## 2023-09-25 RX ADMIN — MIDODRINE HYDROCHLORIDE 20 MILLIGRAM(S): 2.5 TABLET ORAL at 14:16

## 2023-09-25 RX ADMIN — Medication 15 MILLILITER(S): at 11:39

## 2023-09-25 RX ADMIN — PANTOPRAZOLE SODIUM 40 MILLIGRAM(S): 20 TABLET, DELAYED RELEASE ORAL at 11:39

## 2023-09-25 RX ADMIN — Medication 3 MILLILITER(S): at 10:14

## 2023-09-25 RX ADMIN — Medication 3 MILLILITER(S): at 16:28

## 2023-09-25 RX ADMIN — HUMAN INSULIN 5 UNIT(S): 100 INJECTION, SUSPENSION SUBCUTANEOUS at 17:14

## 2023-09-25 RX ADMIN — Medication 80 MILLIGRAM(S): at 05:24

## 2023-09-25 RX ADMIN — ATOVAQUONE 1500 MILLIGRAM(S): 750 SUSPENSION ORAL at 11:39

## 2023-09-25 RX ADMIN — Medication 3: at 11:38

## 2023-09-25 RX ADMIN — Medication 3 MILLILITER(S): at 03:59

## 2023-09-25 RX ADMIN — SENNA PLUS 10 MILLILITER(S): 8.6 TABLET ORAL at 22:27

## 2023-09-25 RX ADMIN — MIDODRINE HYDROCHLORIDE 20 MILLIGRAM(S): 2.5 TABLET ORAL at 22:27

## 2023-09-25 NOTE — SWALLOW BEDSIDE ASSESSMENT ADULT - PHARYNGEAL PHASE
Delayed pharyngeal swallow/Decreased laryngeal elevation/Cough post oral intake Delayed pharyngeal swallow/Decreased laryngeal elevation/Cough post oral intake/Multiple swallows

## 2023-09-25 NOTE — SWALLOW BEDSIDE ASSESSMENT ADULT - SWALLOW EVAL: DIAGNOSIS
Pt presented with 5x tsp puree solids. Pt with reduced oral containment, delayed posterior transfer with trace lingual stasis present post swallow. Pt initially with no overt signs of aspiration, however as trials progressed, spo2 dropped to 85 post swallow (quick recovery to 94 noted), pt exhibited multiple swallows as well as delayed, weak coughing post swallow Pt presented with 5x tsp puree solids. Pt with reduced oral containment, delayed posterior transfer with trace lingual stasis present post swallow. Pt initially with no overt signs of aspiration, however as trials progressed, spo2 dropped to 85 post swallow (quick recovery to 94 noted), pt exhibited multiple swallows as well as delayed, weak coughing post swallow. Patient determined to be high aspiration risk given recent 12 day intubation, subsequent poor vocal quality and signs of pharyngeal dysphagia/aspiration at bedside.

## 2023-09-25 NOTE — PROGRESS NOTE ADULT - ASSESSMENT
62-year-old female with atrial fibrillation, a history of sciatica, and a construction materials occupation was transferred for ECMO support due to severe respiratory distress. Initially admitted for acute hypoxemic respiratory failure and ILD-like findings, she responded to prednisone 40 mg daily but developed weakness/tachycardia with Mycophenolate mofetil 500mg BID (A fibr with RVR). Despite interventions, her condition worsened, intubated and ECMO transfer.    # Acute severe hepatitis -likely due to hypoperfusion   # MCTD with DAH    - Skin rash on chest with periungual ulcers, ILD, leukopenia, thrombocytopenia.   - Serology: ARIANA 1:1280 Speckled, dsDNA <12. + SM, + Anti-RNP, U1-snRNP RNP A 162, U1-snRNP RNP-70kd 111  - CT chest (8/28): Since August 26, 2023, again interstitial lung disease non-UIP pattern. Although no subpleural sparing, possibly interstitialpneumonia, differential includes NSIP associated with connective tissue disorders, chronic HP or drug toxicity.  - Bronchoscopy results (08/30): Bronchial tissue and interstitium showing organizing pneumonia and focal fibrin  - CT chest (9/11): Improved/decreased extent of bilateral groundglass opacities and reticular markings compared to the previous study. Findings are nonspecific but differential etiologies include interstitial pneumonia, drug toxicity, nonspecific connective tissue disorders. New small focus of parenchymal consolidation seen in the lingula;   possible small focal pneumonia.  - Repeat Bronchoscopy (9/13): BAL performed of lingula. Serial BAL x3 performed of RML with progressively bloody return.  - Lab showed thrombocytopenia since she developed respiratory failure ( normal on the first admission to [facility])   - LFT improving   - S/p Rituximab 1 gr on 9/16/23  - s/p plasmapheresis (First session (9/15), (9/18), (9/20)  - ID consulted - c/w meropenem  - 9/20/2023: lungs are improving clinically and radiographically. Repeat bronch without signs of DAH  - 9/22: Pt extubated and d/c ECMO    #pancytopenia. improving   - leukopenia improved s/p filgrastim   - persistent thrombocytopenia  - MAS is unlikely   - likely multifactorial: shock liver and/or medication induced      Recommendations:  - c/w solumedrol 80 mg qd, will consider taper next week  - will plan for next rituxan dose ~9/30/23  - will follow     Discussed with Attending Dr. 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 Initially admitted for acute hypoxemic respiratory failure and ILD-like findings, she responded to prednisone 40 mg daily but developed weakness/tachycardia with Mycophenolate mofetil 500mg BID (A fibr with RVR). Despite interventions, her condition worsened, intubated and ECMO transfer.    # Acute severe hepatitis -likely due to hypoperfusion   # MCTD with DAH    - Skin rash on chest with periungual ulcers, ILD, leukopenia, thrombocytopenia.   - Serology: ARIANA 1:1280 Speckled, dsDNA <12. + SM, + Anti-RNP, U1-snRNP RNP A 162, U1-snRNP RNP-70kd 111  - CT chest (8/28): Since August 26, 2023, again interstitial lung disease non-UIP pattern. Although no subpleural sparing, possibly interstitialpneumonia, differential includes NSIP associated with connective tissue disorders, chronic HP or drug toxicity.  - Bronchoscopy results (08/30): Bronchial tissue and interstitium showing organizing pneumonia and focal fibrin  - CT chest (9/11): Improved/decreased extent of bilateral groundglass opacities and reticular markings compared to the previous study. Findings are nonspecific but differential etiologies include interstitial pneumonia, drug toxicity, nonspecific connective tissue disorders. New small focus of parenchymal consolidation seen in the lingula;   possible small focal pneumonia.  - Repeat Bronchoscopy (9/13): BAL performed of lingula. Serial BAL x3 performed of RML with progressively bloody return.  - Lab showed thrombocytopenia since she developed respiratory failure ( normal on the first admission to [facility])   - LFT improving   - S/p Rituximab 1 gr on 9/16/23  - s/p plasmapheresis (First session (9/15), (9/18), (9/20)  - ID consulted - c/w meropenem  - 9/20/2023: lungs are improving clinically and radiographically. Repeat bronch without signs of DAH  - 9/22: Pt extubated and d/c ECMO  - Off HFNC     #pancytopenia. improved   - leukopenia improved s/p filgrastim   - Thrombocytopenia, improved   - MAS is unlikely   - likely multifactorial: shock liver and/or medication induced    Recommendations:  - c/w solumedrol 80 mg daily, will consider taper next week  - will plan for next rituxan dose ~9/30/23  - will follow     Discussed with Attending Dr. Dominic Hollingsworth MD  PGY-4  Rheumatology Fellow  Re[employer] on TEAMS  [phone] # Acute severe hepatitis -likely due to hypoperfusion   # MCTD with DAH    - Skin rash on chest with periungual ulcers, ILD, leukopenia, thrombocytopenia.   - Serology: ARIANA 1:1280 Speckled, dsDNA <12. + SM, + Anti-RNP, U1-snRNP RNP A 162, U1-snRNP RNP-70kd 111  - CT chest (8/28): Since August 26, 2023, again interstitial lung disease non-UIP pattern. Although no subpleural sparing, possibly interstitialpneumonia, differential includes NSIP associated with connective tissue disorders, chronic HP or drug toxicity.  - Bronchoscopy results (08/30): Bronchial tissue and interstitium showing organizing pneumonia and focal fibrin  - CT chest (9/11): Improved/decreased extent of bilateral groundglass opacities and reticular markings compared to the previous study. Findings are nonspecific but differential etiologies include interstitial pneumonia, drug toxicity, nonspecific connective tissue disorders. New small focus of parenchymal consolidation seen in the lingula;   possible small focal pneumonia.  - Repeat Bronchoscopy (9/13): BAL performed of lingula. Serial BAL x3 performed of RML with progressively bloody return.  - Lab showed thrombocytopenia since she developed respiratory failure ( normal on the first admission to [facility])   - LFT improving   - S/p Rituximab 1 gr on 9/16/23  - s/p plasmapheresis (First session (9/15), (9/18), (9/20)  - ID consulted - c/w meropenem  - 9/20/2023: lungs are improving clinically and radiographically. Repeat bronch without signs of DAH  - 9/22: Pt extubated and d/c ECMO  - Off HFNC     #pancytopenia. improved   - leukopenia improved s/p filgrastim   - Thrombocytopenia, improved   - MAS is unlikely   - likely multifactorial: shock liver and/or medication induced    Recommendations:  - c/w solumedrol 80 mg daily, will consider taper next week  - will plan for next rituxan dose ~9/30/23  - will follow     Discussed with Attending Dr. Jv Hollingsworth MD  PGY-4  Rheumatology Fellow  Re[employer] on TEAMS  [phone]

## 2023-09-25 NOTE — PROGRESS NOTE ADULT - SUBJECTIVE AND OBJECTIVE BOX
INTERVAL HX:  Patient is extubated today, still on precedex and levophed  on HFNC, weaning off  S/p 3 PLEX      MEDICATIONS  (STANDING):  albuterol/ipratropium for Nebulization 3 milliLiter(s) Nebulizer every 6 hours  argatroban Infusion 0.4 MICROgram(s)/kG/Min (3.06 mL/Hr) IV Continuous <Continuous>  artificial  tears Solution 1 Drop(s) Both EYES every 12 hours  atovaquone  Suspension 1500 milliGRAM(s) Oral daily  chlorhexidine 2% Cloths 1 Application(s) Topical <User Schedule>  dexMEDEtomidine Infusion 0.2 MICROgram(s)/kG/Hr (6.38 mL/Hr) IV Continuous <Continuous>  dextrose 5%. 1000 milliLiter(s) (50 mL/Hr) IV Continuous <Continuous>  dextrose 50% Injectable 12.5 Gram(s) IV Push once  dextrose 50% Injectable 25 Gram(s) IV Push once  dextrose 50% Injectable 25 Gram(s) IV Push once  folic acid 1 milliGRAM(s) Oral daily  glucagon  Injectable 1 milliGRAM(s) IntraMuscular once  insulin lispro (ADMELOG) corrective regimen sliding scale   SubCutaneous every 6 hours  insulin NPH human recombinant 11 Unit(s) SubCutaneous every 6 hours  methylPREDNISolone sodium succinate Injectable 80 milliGRAM(s) IV Push daily  metoprolol tartrate 12.5 milliGRAM(s) Oral two times a day  multivitamin/minerals/iron Oral Solution (CENTRUM) 15 milliLiter(s) Oral daily  pantoprazole  Injectable 40 milliGRAM(s) IV Push daily  phenylephrine    Infusion 0.499 MICROgram(s)/kG/Min (23.9 mL/Hr) IV Continuous <Continuous>  polyethylene glycol 3350 17 Gram(s) Oral <User Schedule>  QUEtiapine 25 milliGRAM(s) Oral at bedtime  senna Syrup 10 milliLiter(s) Oral two times a day    MEDICATIONS  (PRN):  dextrose Oral Gel 15 Gram(s) Oral once PRN Blood Glucose LESS THAN 70 milliGRAM(s)/deciliter      Allergies    No Known Allergies    Intolerances        PERTINENT MEDICATION HISTORY:      Social History:      PAST MEDICAL & SURGICAL HISTORY:      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[employer] HISTORY:    FAMILY HISTORY:      Vital Signs Last 24 Hrs  T(C): 37.1 (22 Sep 2023 12:00), Max: 37.6 (21 Sep 2023 19:00)  T(F): 98.7 (22 Sep 2023 12:00), Max: 99.6 (21 Sep 2023 19:00)  HR: 98 (22 Sep 2023 15:00) (69 - 103)  BP: --  BP(mean): --  RR: 32 (22 Sep 2023 15:00) (17 - 39)  SpO2: 96% (22 Sep 2023 15:00) (93% - 100%)    Parameters below as of 22 Sep 2023 14:38  Patient On (Oxygen Delivery Method): nasal cannula, high flow  O2 Flow (L/min): 60  O2 Concentration (%): 40    Physical Exam:  General: No apparent distress  HEENT: EOMI, MMM  CVS: +S1/S2, RRR, no murmurs/rubs/gallops  Resp: CTA b/l. No crackles/wheezing  GI: Soft, NT/ND +BS  MSK: no swelling/warmth/erythema of the joints of the UE/LE  Neuro: AAOx3  Skin: red rash on knuckles with multiple periungual ulcers                         12.9   22.21 )-----------( 82       ( 22 Sep 2023 11:00 )             39.5     09-22    132<L>  |  94<L>  |  33<H>  ----------------------------<  220<H>  4.6   |  25  |  0.41<L>    Ca    9.3      22 Sep 2023 11:00  Phos  3.3     09-22  Mg     2.30     09-22    TPro  5.5<L>  /  Alb  3.3  /  TBili  19.3<H>  /  DBili  14.2<H>  /  AST  156<H>  /  ALT  276<H>  /  AlkPhos  579<H>  09-22    PT/INR - ( 22 Sep 2023 07:55 )   PT: 19.9 sec;   INR: 1.81 ratio         PTT - ( 22 Sep 2023 07:55 )  PTT:55.6 sec  Urinalysis Basic - ( 22 Sep 2023 11:00 )    Color: x / Appearance: x / SG: x / pH: x  Gluc: 220 mg/dL / Ketone: x  / Bili: x / Urobili: x   Blood: x / Protein: x / Nitrite: x   Leuk Esterase: x / RBC: x / WBC x   Sq Epi: x / Non Sq Epi: x / Bacteria: x            Rheumatology Work Up    Creatine Kinase, Serum: 31 U/L [25 - 170] (09-22-23 @ 00:15)  C-Reactive Protein, Serum: 105.7 mg/L *H* (09-16-23 @ 11:35)  Sedimentation Rate, Erythrocyte: 10 mm/hr [4 - 25] (09-16-23 @ 11:35)  Beta 2 Glycoprotein 1 Antibody Screen: Negative (09-14-23 @ 13:31)    RADIOLOGY & ADDITIONAL STUDIES:    < from: US Abdomen Upper Quadrant Right (09.22.23 @ 14:57) >  IMPRESSION:  Pancreas is not visualized. No other abnormality.    < end of copied text >    < from: Xray Chest 1 View- PORTABLE-Routine (Xray Chest 1 View- PORTABLE-Routine .) (09.20.23 @ 09:38) >  FINDINGS:  Superior and inferior vena cava ECMO catheters are present.    An endotracheal and enteric tube are also seen.    Lungs show no focal abnormalities although periphery of left lung shows   possible small loculated effusion.    No pneumothorax.    < end of copied text >       INTERVAL HX:    Patient seen today, alert and oriented  Reports no pain  Awaiting for dysphagia eval (failed twice)    MEDICATIONS  (STANDING):  albuterol/ipratropium for Nebulization 3 milliLiter(s) Nebulizer every 6 hours  artificial  tears Solution 1 Drop(s) Both EYES every 12 hours  atovaquone  Suspension 1500 milliGRAM(s) Oral daily  chlorhexidine 2% Cloths 1 Application(s) Topical <User Schedule>  dextrose 5%. 1000 milliLiter(s) (50 mL/Hr) IV Continuous <Continuous>  dextrose 50% Injectable 25 Gram(s) IV Push once  dextrose 50% Injectable 12.5 Gram(s) IV Push once  dextrose 50% Injectable 25 Gram(s) IV Push once  enoxaparin Injectable 120 milliGRAM(s) SubCutaneous every 12 hours  folic acid 1 milliGRAM(s) Oral daily  glucagon  Injectable 1 milliGRAM(s) IntraMuscular once  insulin lispro (ADMELOG) corrective regimen sliding scale   SubCutaneous every 6 hours  insulin NPH human recombinant 5 Unit(s) SubCutaneous every 6 hours  methylPREDNISolone sodium succinate Injectable 80 milliGRAM(s) IV Push daily  metoprolol tartrate 25 milliGRAM(s) Oral two times a day  midodrine 20 milliGRAM(s) Oral every 8 hours  multivitamin/minerals/iron Oral Solution (CENTRUM) 15 milliLiter(s) Oral daily  pantoprazole  Injectable 40 milliGRAM(s) IV Push daily  QUEtiapine 12.5 milliGRAM(s) Oral at bedtime  senna Syrup 10 milliLiter(s) Oral at bedtime    MEDICATIONS  (PRN):  dextrose Oral Gel 15 Gram(s) Oral once PRN Blood Glucose LESS THAN 70 milliGRAM(s)/deciliter      Allergies    No Known Allergies    Intolerances        PERTINENT MEDICATION HISTORY:      Social History:      PAST MEDICAL & SURGICAL HISTORY:      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[employer] HISTORY:    FAMILY HISTORY:      Vital Signs Last 24 Hrs  T(C): 37.1 (25 Sep 2023 16:00), Max: 37.2 (25 Sep 2023 12:00)  T(F): 98.8 (25 Sep 2023 16:00), Max: 99 (25 Sep 2023 12:00)  HR: 77 (25 Sep 2023 16:28) (75 - 98)  BP: --  BP(mean): --  RR: 30 (25 Sep 2023 16:00) (16 - 32)  SpO2: 96% (25 Sep 2023 16:00) (91% - 100%)    Parameters below as of 25 Sep 2023 15:32  Patient On (Oxygen Delivery Method): nasal cannula        Physical Exam:  General: No apparent distress, obese   HEENT: EOMI, MMM  CVS: +S1/S2, RRR, no murmurs/rubs/gallops  Resp: CTA b/l. No crackles/wheezing  GI: Soft, NT/ND +BS  MSK: Muscle strength 2/5 in upper and lower extremity, was able to /move feet   Neuro: AAOx3  Skin: Periungual ulcer, rashes on the knuckles      LABS:                        10.4   18.38 )-----------( 143      ( 25 Sep 2023 00:45 )             32.2     09-25    142  |  104  |  40<H>  ----------------------------<  160<H>  4.2   |  25  |  0.50    Ca    9.7      25 Sep 2023 00:45  Phos  3.0     09-25  Mg     2.40     09-25    TPro  4.7<L>  /  Alb  3.2<L>  /  TBili  15.0<H>  /  DBili  x   /  AST  120<H>  /  ALT  176<H>  /  AlkPhos  536<H>  09-25    PT/INR - ( 25 Sep 2023 00:45 )   PT: 25.6 sec;   INR: 2.34 ratio         PTT - ( 25 Sep 2023 00:45 )  PTT:57.7 sec  Urinalysis Basic - ( 25 Sep 2023 00:45 )    Color: x / Appearance: x / SG: x / pH: x  Gluc: 160 mg/dL / Ketone: x  / Bili: x / Urobili: x   Blood: x / Protein: x / Nitrite: x   Leuk Esterase: x / RBC: x / WBC x   Sq Epi: x / Non Sq Epi: x / Bacteria: x      Rheumatology Work Up    Creatine Kinase, Serum: 51 U/L [25 - 170] (09-24-23 @ 03:15)  Creatine Kinase, Serum: 31 U/L [25 - 170] (09-22-23 @ 00:15)  C-Reactive Protein, Serum: 105.7 mg/L *H* (09-16-23 @ 11:35)  Sedimentation Rate, Erythrocyte: 10 mm/hr [4 - 25] (09-16-23 @ 11:35)    ARIANA 1:1280 Speckled  DsDNA <12  SM (Alejandro) Ab FBIA >8  Anti-RNP >8  c-ANCA Negative  p-ANCA Negative  Atypical ANCA Indeterminate  Anti-SS-A Ab 0.4  Anti-SS-B Ab <0.2  RF Quant <10  CCP Ab IgG Negative  Centromere Ab <0.2  RNA Polymerase III IgG 16    Systemic Sclerosis 12 Ab Pnl 2 (08- 05:30)  CCP <8  Scl 70 <11  CENP-A <11  RP11 <11   <11  Ku positive   U1-snRNP RNP A 162  U1-snRNP RNP C 71  U1-snRNP RNP-70kd 111  Fibrillarin <11  Th/To <11  PM/Scl-100 <11  PM/Scl-75 <11    Hypersensitivity Pneumonitis Panel – Negative (08- 6:06)    Bronchoscopy results (08- 12:54)  -	Fungitell B-D Galactomanan 93   -	Aspergillus Galactomannan Antigen 1.75  -	Appearance – Hazy  -	Color – No Color  -	Total Nucleated Cell Count – 19   -	Total RBC Count – 1123   -	Neutrophils 13  -	BF Lymphocytes 2  -	Monocytes/Macrophages 4   -	Cytopathology  o	Final Diagnosis  o	BRONCHOALVEOLAR LAVAGE, RIGHT LOWER LOBE:  o	NEGATIVE FOR MALIGNANT CELLS.  o	Pulmonary macrophages, bronchial cells and squamous cells.  -	Surgical Pathology  o	Final Diagnosis  o	Lung, right lower lobe; biopsy:  o	Bronchial tissue and interstitium showing organizing pneumonia and focal fibrin - See Note.  o	Note: Multiple deeper levels were performed on block 1A. The histologic findings are that of organizing pneumonia and focal fibrin. No granuloma, neoplasm or interstitial fibrosis is identified. Clinical and radiographic correlation is suggested. This case was also reviewed virtually at the [facility] Path[facility] Consensus Conference on 8/31/2023, and again virtually to Dr. Andino on 9/1/2023.  -	TM Interpretation  o	Diagnosis:  o	Bronchioalveolar Lavage  o	Flow cytometric analysis attempted, however insufficient cells to report.      BRONCHOSCOPY (08- 11:30)  Procedure Performed: Bronchoscopy with BAL and TBBx    The bronchoscope was inserted with ease and the airway examined to subsegments bilaterally. BAL performed in RB8 followed by TBBX performed in RB8 3 subsegments. Patient tolerated procedure well.    Limitations/Complications:  There were no apparent limitations or complications    Findings:  Normal distal trachea and main [place]lando. Scant mucous bilaterally. No endobronchial lesions. Mildly ectatic airways.CT Angio Chest PE Protocol w/ IV Cont (08.26.23 @ 15:10)   FINDINGS: No visualized PE.The pulmonary trunk measures 3.3 cm diameter.     The heart is enlarged.     Trace left lateral asymmetric pericardial effusion is seen.     The great vessels are unremarkable. Left vertebral artery arises   directly from the aortic arch.     No mediastinal or hilar lymphadenopathy is seen.    Mediastinal lipomatosis.    Evaluation of the pulmonary parenchyma demonstrates bilateral   interstitial lung disease in the upper, mid and lower zones with   peripheral groundglass opacities and reticulation. Less involvement of   the bilateral upper zones. No traction bronchiectasis or honeycombing.    Bilateral air trapping.     No pleural effusions are seen.    Limited evaluation of the upper abdomen demonstrates 1.6 cm hypodensity   in segment one likely cyst. Small to moderate hiatal hernia.    Evaluation of the osseous structures demonstrates no significant   abnormality.      IMPRESSION:  No visualized PE.    Bilateral interstitial lung disease. Differential diagnosis includes  HP,NSIP, atypical pneumonia.      CT Chest No Cont (08.28.23 @ 16:47) >  IMPRESSION:  1. Since August 26, 2023, again interstitial lung disease non-UIP   pattern. Although no subpleural sparing, possibly interstitialpneumonia,   differential includes NSIP associated with connective tissue disorders,   chronic HP or drug toxicity.    CT Angio Chest PE Protocol w/ IV Cont (09.11.23 @ 13:45) >  IMPRESSION:  1.  Improved/decreased extent of bilateral groundglass opacities and   reticular markings compared to the previous study. Findings are   nonspecific but differential etiologies include interstitial pneumonia,   drug toxicity, nonspecific connective tissue disorders.  2.  New small focus of parenchymal consolidation seen in the lingula;   possible small focal pneumonia.  3.  No pulmonary embolism      < end of copied text >    BRONCHOSCOPY (2nd)   · Procedure Date/Time	13-Sep-2023 17:18  · Pre-Bronchoscopy Tuberculosis Risk Assessment Screening(check 'Preview' tab for full text on these list values when selected)	I evaluated the patient prior to bronchoscopy procedure for active pulmonary/laryngeal M. tuberculosis disease and the risk and actions taken:    Low risk with routine standard of care measures followed.  · Informed Consent	Benefits, risks, and possible complications of procedure explained to patient/caregiver who verbalized understanding and gave written consent.  · Procedure Performed By	Miky Borden  · Procedure Details	  Bronchoscope inserted through ETT. ETT noted to be in good position. Airway evaluation revealed mild thick yellow secretions in trachea. Diffuse moderate thin green/brown secretions throughout. Secretions therapeutically suctioned. BAL performed of lingula. Serial BAL x3 performed of RML with progressively bloody return.  Bronchoscope then withdrawn from ETT.            RADIOLOGY & ADDITIONAL STUDIES:       INTERVAL HX:    Patient seen today, alert and oriented  Reports no pain  Awaiting for dysphagia eval (failed twice)    MEDICATIONS  (STANDING):  albuterol/ipratropium for Nebulization 3 milliLiter(s) Nebulizer every 6 hours  artificial  tears Solution 1 Drop(s) Both EYES every 12 hours  atovaquone  Suspension 1500 milliGRAM(s) Oral daily  chlorhexidine 2% Cloths 1 Application(s) Topical <User Schedule>  enoxaparin Injectable 120 milliGRAM(s) SubCutaneous every 12 hours  folic acid 1 milliGRAM(s) Oral daily  glucagon  Injectable 1 milliGRAM(s) IntraMuscular once  insulin lispro (ADMELOG) corrective regimen sliding scale   SubCutaneous every 6 hours  insulin NPH human recombinant 5 Unit(s) SubCutaneous every 6 hours  methylPREDNISolone sodium succinate Injectable 80 milliGRAM(s) IV Push daily  metoprolol tartrate 25 milliGRAM(s) Oral two times a day  midodrine 20 milliGRAM(s) Oral every 8 hours  multivitamin/minerals/iron Oral Solution (CENTRUM) 15 milliLiter(s) Oral daily  pantoprazole  Injectable 40 milliGRAM(s) IV Push daily  QUEtiapine 12.5 milliGRAM(s) Oral at bedtime  senna Syrup 10 milliLiter(s) Oral at bedtime    MEDICATIONS  (PRN):  dextrose Oral Gel 15 Gram(s) Oral once PRN Blood Glucose LESS THAN 70 milliGRAM(s)/deciliter    NKA            Vital Signs Last 24 Hrs  T(C): 37.1 (25 Sep 2023 16:00), Max: 37.2 (25 Sep 2023 12:00)  T(F): 98.8 (25 Sep 2023 16:00), Max: 99 (25 Sep 2023 12:00)  HR: 77 (25 Sep 2023 16:28) (75 - 98)  BP: --  BP(mean): --  RR: 30 (25 Sep 2023 16:00) (16 - 32)  SpO2: 96% (25 Sep 2023 16:00) (91% - 100%)    Parameters below as of 25 Sep 2023 15:32  Patient On (Oxygen Delivery Method): nasal cannula        Physical Exam:  General: No apparent distress, obese   HEENT: EOMI, MMM  CVS: +S1/S2,  Resp: CTA b/l.  GI: Soft, NT/ND +BS  MSK: Muscle strength 2/5 in upper and lower extremity, was able to /move feet   Neuro: AAOx3  Skin: Periungual ulcer, rashes on the knuckles      LABS:                        10.4   18.38 )-----------( 143      ( 25 Sep 2023 00:45 )             32.2     09-25    142  |  104  |  40<H>  ----------------------------<  160<H>  4.2   |  25  |  0.50    Ca    9.7      25 Sep 2023 00:45  Phos  3.0     09-25  Mg     2.40     09-25    TPro  4.7<L>  /  Alb  3.2<L>  /  TBili  15.0<H>  /  DBili  x   /  AST  120<H>  /  ALT  176<H>  /  AlkPhos  536<H>  09-25    PT/INR - ( 25 Sep 2023 00:45 )   PT: 25.6 sec;   INR: 2.34 ratio         PTT - ( 25 Sep 2023 00:45 )  PTT:57.7 sec  Urinalysis Basic - ( 25 Sep 2023 00:45 )    Color: x / Appearance: x / SG: x / pH: x  Gluc: 160 mg/dL / Ketone: x  / Bili: x / Urobili: x   Blood: x / Protein: x / Nitrite: x   Leuk Esterase: x / RBC: x / WBC x   Sq Epi: x / Non Sq Epi: x / Bacteria: x      Rheumatology Work Up    Creatine Kinase, Serum: 51 U/L [25 - 170] (09-24-23 @ 03:15)  Creatine Kinase, Serum: 31 U/L [25 - 170] (09-22-23 @ 00:15)  C-Reactive Protein, Serum: 105.7 mg/L *H* (09-16-23 @ 11:35)  Sedimentation Rate, Erythrocyte: 10 mm/hr [4 - 25] (09-16-23 @ 11:35)    ARIANA 1:1280 Speckled  DsDNA <12  SM (Alejandro) Ab FBIA >8  Anti-RNP >8  c-ANCA Negative  p-ANCA Negative  Atypical ANCA Indeterminate  Anti-SS-A Ab 0.4  Anti-SS-B Ab <0.2  RF Quant <10  CCP Ab IgG Negative  Centromere Ab <0.2  RNA Polymerase III IgG 16    Systemic Sclerosis 12 Ab Pnl 2 (08- 05:30)  CCP <8  Scl 70 <11  CENP-A <11  RP11 <11   <11  Ku positive   U1-snRNP RNP A 162  U1-snRNP RNP C 71  U1-snRNP RNP-70kd 111  Fibrillarin <11  Th/To <11  PM/Scl-100 <11  PM/Scl-75 <11    Hypersensitivity Pneumonitis Panel – Negative (08- 6:06)    Bronchoscopy results (08- 12:54)  -	Fungitell B-D Galactomanan 93   -	Aspergillus Galactomannan Antigen 1.75  -	Appearance – Hazy  -	Color – No Color  -	Total Nucleated Cell Count – 19   -	Total RBC Count – 1123   -	Neutrophils 13  -	BF Lymphocytes 2  -	Monocytes/Macrophages 4   -	Cytopathology  o	Final Diagnosis  o	BRONCHOALVEOLAR LAVAGE, RIGHT LOWER LOBE:  o	NEGATIVE FOR MALIGNANT CELLS.  o	Pulmonary macrophages, bronchial cells and squamous cells.  -	Surgical Pathology  o	Final Diagnosis  o	Lung, right lower lobe; biopsy:  o	Bronchial tissue and interstitium showing organizing pneumonia and focal fibrin - See Note.  o	Note: Multiple deeper levels were performed on block 1A. The histologic findings are that of organizing pneumonia and focal fibrin. No granuloma, neoplasm or interstitial fibrosis is identified. Clinical and radiographic correlation is suggested. This case was also reviewed virtually at the [facility] Pathology Consensus Conference on 8/31/2023, and again virtually to Dr. Andino on 9/1/2023.  -	TM Interpretation  o	Diagnosis:  o	Bronchioalveolar Lavage  o	Flow cytometric analysis attempted, however insufficient cells to report.      BRONCHOSCOPY (08- 11:30)  Procedure Performed: Bronchoscopy with BAL and TBBx    The bronchoscope was inserted with ease and the airway examined to subsegments bilaterally. BAL performed in RB8 followed by TBBX performed in RB8 3 subsegments. Patient tolerated procedure well.    Limitations/Complications:  There were no apparent limitations or complications    Findings:  Normal distal trachea and main [place]. Scant mucous bilaterally. No endobronchial lesions. Mildly ectatic airways.CT Angio Chest PE Protocol w/ IV Cont (08.26.23 @ 15:10)   FINDINGS: No visualized PE.The pulmonary trunk measures 3.3 cm diameter.     The heart is enlarged.     Trace left lateral asymmetric pericardial effusion is seen.     The great vessels are unremarkable. Left vertebral artery arises   directly from the aortic arch.     No mediastinal or hilar lymphadenopathy is seen.    Mediastinal lipomatosis.    Evaluation of the pulmonary parenchyma demonstrates bilateral   interstitial lung disease in the upper, mid and lower zones with   peripheral groundglass opacities and reticulation. Less involvement of   the bilateral upper zones. No traction bronchiectasis or honeycombing.    Bilateral air trapping.     No pleural effusions are seen.    Limited evaluation of the upper abdomen demonstrates 1.6 cm hypodensity   in segment one likely cyst. Small to moderate hiatal hernia.    Evaluation of the osseous structures demonstrates no significant   abnormality.      IMPRESSION:  No visualized PE.    Bilateral interstitial lung disease. Differential diagnosis includes  HP,NSIP, atypical pneumonia.      CT Chest No Cont (08.28.23 @ 16:47) >  IMPRESSION:  1. Since August 26, 2023, again interstitial lung disease non-UIP   pattern. Although no subpleural sparing, possibly interstitialpneumonia,   differential includes NSIP associated with connective tissue disorders,   chronic HP or drug toxicity.    CT Angio Chest PE Protocol w/ IV Cont (09.11.23 @ 13:45) >  IMPRESSION:  1.  Improved/decreased extent of bilateral groundglass opacities and   reticular markings compared to the previous study. Findings are   nonspecific but differential etiologies include interstitial pneumonia,   drug toxicity, nonspecific connective tissue disorders.  2.  New small focus of parenchymal consolidation seen in the lingula;   possible small focal pneumonia.  3.  No pulmonary embolism      < end of copied text >    BRONCHOSCOPY (2nd)   · Procedure Date/Time	13-Sep-2023 17:18  · Pre-Bronchoscopy Tuberculosis Risk Assessment Screening(check 'Preview' tab for full text on these list values when selected)	I evaluated the patient prior to bronchoscopy procedure for active pulmonary/laryngeal M. tuberculosis disease and the risk and actions taken:    Low risk with routine standard of care measures followed.  · Informed Consent	Benefits, risks, and possible complications of procedure explained to patient/caregiver who verbalized understanding and gave written consent.  · Procedure Performed By	Miky Borden  · Procedure Details	  Bronchoscope inserted through ETT. ETT noted to be in good position. Airway evaluation revealed mild thick yellow secretions in trachea. Diffuse moderate thin green/brown secretions throughout. Secretions therapeutically suctioned. BAL performed of lingula. Serial BAL x3 performed of RML with progressively bloody return.  Bronchoscope then withdrawn from ETT.            RADIOLOGY & ADDITIONAL STUDIES:

## 2023-09-25 NOTE — CHART NOTE - NSCHARTNOTEFT_GEN_A_CORE
: Michi Pack    INDICATION: Shock    PROCEDURE:  [X] LIMITED ECHO  [ ] LIMITED CHEST  [ ] LIMITED RETROPERITONEAL  [ ] LIMITED ABDOMINAL  [ ] LIMITED DVT  [ ] NEEDLE GUIDANCE VASCULAR  [ ] NEEDLE GUIDANCE THORACENTESIS  [ ] NEEDLE GUIDANCE PARACENTESIS  [ ] NEEDLE GUIDANCE PERICARDIOCENTESIS  [ ] OTHER    FINDINGS: Grossly normal LV systolic function. RV smaller than LV in size. IVC 1.5 cm.       INTERPRETATION: Grossly normal LV systolic function. Possibly fluid responsive.    Images stored on [employer].    Jozef Borden MD  Pulmonary & Critical Care

## 2023-09-25 NOTE — PROGRESS NOTE ADULT - ASSESSMENT
63 yo F w/ Afib initially presented to urgent care for SOB where she had an XR done concerning for b/l lower infiltrates, sent to [facility] ED and admitted to  on 8/26 after being found to be hypoxemic, reported having  debilitating b/l hand numbness/tingling and some muscles weakness several months. She was found to have interstitial lung disease appearance on CT, with the plan for further ILD workup and management, started on prednisone on admission with gradually improving O2 requirements and stable on 2-3LNC. She underwent bronchoscopy with TBBX on 8/30 which showed Non-Specific Intersitial Disease (NSIP)/OP. Labs notable for positive ARIANA 1:1280, RNP > 8, Alejandro > 8. Patient continued on steroids and received cellcept, which was discontinued 2/2 Afib-RVR. Interval CTA chest on 9/11 also negative PE did show possible lingula pneumonia. Started on empiric vancomycin and zosyn 9/12, course was then c/b episode of SVT to 170s w/ rapidly progressive hypoxemic respiratory failure, placed NRB offered HFNC/BiPAP but refused, leading to worsening hypoxemic respiratory failure requiring intubation on 9/13. Despite best practices, patient remained hypoxemic and patient was cannulated for VV-ECMO on 9/13 and transferred to [facility] for further management. 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 Showed significant lung improvement and was decannulated 9/21. Extubated 9/22.     NEUROLOGY  Home meds: gabapentin 100g TID  AA&Ox3 at baseline   - following commands w/ delirium likely iso prolonged hospitalization, sedation, and ICU setting  - requiring precedex  for anxiety, stopped 9/22  -will start seroquel 25 mg qhs monitor qtc and increase if needed   - CTH 9/14 no acute findings  - Palliative following  - Social Work consult  - PT/OT following    PULMONARY  Admitted to [facility] on 8/26 after p/w SOB, found to be hypoxemic, required 2-4L NC/bibap, initially responded well to IV steroids. 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 2/2 bacterial PNA vs atypical PNA vs aspiration vs AEILD continued to show significant improvement from a lung perspective and was decannulated 9/21, and extubated to HFNC 9/22.  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   - 9/20 BAL culture: normal respiratory selvin  - continue duonebs  - s/p empiric abx   - s/p decannulation on 9/21 with plan for suture removal ~7-10 days   - extubated 9/22 to HFNC   - wean HFNC as tolerated currently on 40L/40%     CARDIOVASCULAR  Hx of Afib w at OSH, started on Amiodarone gtt now in shock state requiring requiring pressors likely septic with component of vaspoplegia i/s/o sedation, possible contribution of cardiogenic from RV dysfunction. Now having episodes of SVT responsive to lopressor   - No PE seen on invasive pulmonary angiography at time of ECMO cannulation or in recent imaging  - NO2 weaned off  (9/15) RV function remains unchanged  - s/p milrinone, off since (9/13)  - converted to sinus (9/14) discontinued amio gtt iso bradycardia   - back to AF with RVR on 9/19 when sedation weaned off c/w metoprolol 12.5mg BID for rate control and increase as needed   - BP labile, has received hydralazine IVP for hypertensive episodes w/ anxiety likely a contributing factor, however is also requiring low dose Phenylephrine  for intermittent episodes of hypotension that occurs mostly while asleep.  -currently off marianne since 9/23 but BP remains soft will start midodrine 10mg TID and give albumin 250cc x2 for small IVC   - RITCHIE 9/14 : No MEREDIHT thrombus noted, negative for PFO. LVOT diameter of 2 cm  - TTE 9/12 with EF 65-70% with normal LV and RV  - TTE 9/14 with  EF 18%. Severe global LV systolic dysfunction. RV enlargement with decreased RV systolic function, however RITCHIE and recent POCUS shows normal LV systolic function, improved RV systolic function   - TTE 9/19 with recovered EF to 67% but still with RV enlargement and systolic dysfunction    GASTROINTESTINAL  Transaminase elevation likely 2/2 combination of shock liver, malposition of ECMO cannula and liver dysfunction  - ALK/ AST/ALT downtrending but remain elevated 490/136/225, T.bili continues to rise 16.3 today  - Acute hepatitis panel negative  - RUQ US 9/15 shows fatty infiltration of liver, negative for hepatobiliary pathology, repeat RUQ US performed 9/22 for worsening LFT's and rising bili with findings negative as well  - Hepatology consulted - likely shock liver with expected delay in improvement in bilirubin  - Hypertriglyceridemia 836, hx of fatty liver and propofol gtt, s/p insulin gtt, now improving  - Last BM noted 9/24  - continue bowel regimen senna and miralax  - CT abdomen 9/15 w/o bowel obstruction, noted with colonic diverticulosis mostly in sigmoid, w/o evidence of diverticulitis  - continue PPI iso steroids   - nutrition following  - failed dysphagia screen, will place KFT and restart tube feeds and reassess speech and swallow     RENAL  sCr baseline 0.78  - Creatinine wnl  - s/p diuresis with lasix, last administered 9/15, fluid removal via hemo concentrator while on ECMO circuit, will assess daily if lasix is needed   - s/p Bumex 1mg IVP given to optimize post extubation   - good UO with primafit  - hypernatremia improved, free H2O discontinued 9/21  -IVC small with BP 80-90's will give albumin 250cc x2       INFECTIOUS DISEASE  Leukocytosis  - Elevated WBC likely iso filgrastim (initiated 9/17 for leukopenia) as well as s/p ECMO decannulation, however would send cultures if continues to uptrend or febrile  - monitoring off antibiotics for now  - leukopenia now resolved s/p filgrastim (9/17-9/21)  - Bactrim DS discontinued iso leukopenia, continue Mepron for PJP prophylaxis instead  - s/p IV Ceftriaxone 5 days? at [facility] out of an abundance of precaution to cover BAL specimen  - s/p zosyn at OSH (9/12) for lingula PNA  - vancomycin (9/12-9/15 ) d/c'd negative mrsa PCR, and azithro (9/14-9/15) d/c'd neg Urine legionella  - s/p empiric donald (9/13-9/20) now that ANC >1.5  - vanco restarted for ppx on 9/18 d/t leukopenia but stopped on 9/20  - 9/13 and 9/16 urine, sputum and blood cx ngtd  - positive COVID-19 antigen in late August  - f/u BAL, cultures, cytopathology --- so far negative   - ID following- f/u recs      ENDOCRINE  # Hyperglycemia i/s/o steroids  Admission A1c 6.1  - had required insulin gtt currently on NPH 11 with sliding scale will decrease NPH to 5u units and watch glucose closely   - elevated triglycerides 835, has hx of fatty liver, had been on propofol gtt. TG downtrending since initiating insulin gtt for hyperglycemia, now normalizing off propofol  - TSH low at 0.13/Free T4 slightly low 0.7, will repeat in a few days  - insulin sliding scale    HEME  #Right IJ thrombus   - argatroban gtt goal aPTT 50-70   - INR elevated likely iso argatroban and liver dysfxn, s/p Vit. K (9/14) and FFP x1 (9/15) - now resolved  - platelets x 7 for thrombocytopenia <50,000, last transfused  9/21 continues to improve  - fibrinogen 225,   - Hgb remains stable   - Heme following- f/u recs      #Leukopenia  #Thrombocytopenia  - Heme consult placed for thrombocytopenia, noted to also be leukopenic  - unlikely HLH/MAS since many abnormalities can be explained by severe hypoxemia/hypotension during initial decompensation prior to ecmo cannulation but some features that are consistent and with rheumatic disease it is a possibility to f/u hematology regarding utility of further lab/pathologic testing  - peripheral blood smear reviewed: large platelets noted, no platelet clumps, no blast cells noted, neutrophils noted w/ normal number of lobes, nucleated RBC noted  - acute hepatitis panel negative  - s/p filgrastim 480 daily  given ANC >1500 in the setting of possible infxn   - Thrombocytopenia refractory after intermittent transfusions will monitor now off circuit, platelet count continues to improve    #R/O DVT  - LE duplex negative for DVT   - first VA duplex post decannulation 9/21 shows non-occlusive deep vein thrombosis in the right jugular vein and the right cephalic is non-compressible  - second duplex pending       MSK  Onset of debilitating b/l hand cramps and some muscles weakness x several months, unclear etiology, radiculopathy? vs myositis?   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   - symptoms improved with cellcept (9/8-9/11) but discontinued given Afib RVR   - consider restarting home Gabapentin to assist with neuropathic pain? once more alert and awake  - f/u with Dr. Nik Marie or Dante Urbano for EMG upon discharge (osh?)      RHEUM  - started on Solumedrol 60mg q6h from 9/1 to 9/6 then weaned over time to then Medrol 32 mg 9/9 to 9/12 at [facility]  - s/p Cellcept 9/8 to 9/11 but stopped due to Afib- RVR/tachycardia   - CT findings, Improved/decreased extent of bilateral ground glass opacities and reticular markings compared to the previous study. Findings are nonspecific but differential etiologies include interstitial pneumonia, drug toxicity, nonspecific connective tissue disorders.  - OSH labs show strongly positive ARIANA, RNP, and anti smith antibodies with low C4 and normal C3. Patient with negative SSa and SSb, negative DsDNa, myomarker panel negative (except RNP)  - IL2 receptor elevated 3119.2  - Rheum following  - s/p 1g solumedrol - first dose on (9/13) second dose (9/14) the third and last dose  (9/15) and then solumedrol (1mg/kg) 125mg daily, lowered to 80mg daily (9/19) as per rheum, will consider taper this week  - s/p plasmapheresis x3  (9/15), (9/18), (9/20) for therapeutic option to rapidly remove autoantibodies and inflammatory factors from plasma d/t severe DAH  - s/p rituximab 9/16/23  - will plan for next rituximab dose ~9/30/23       SKIN  Reportedly had single episode of painful rash on her shins, ears and neck and chest which resolved with a steroid cream from a dermatologist prior to admission  - no evidence of rash currently  - right groin with breakdown s/p cannula placement        PROPHYLAXIS  # DVT: argatroban  # GI: TF      LINES  -Ecmo Cannulas 9/13-9/21  -[facility] TLC- 9/13  -Left Ax [place] - 9/13 (placed at OSH)  -RA 16g PIV x2- 9/15      GO  -Palliative Following  -full code  -partner Kiara at bedside daily and updated on care    61 yo F w/ Afib initially presented to urgent care for SOB where she had an XR done concerning for b/l lower infiltrates, sent to [facility] ED and admitted to  on 8/26 after being found to be hypoxemic, reported having  debilitating b/l hand numbness/tingling and some muscles weakness several months. She was found to have interstitial lung disease appearance on CT, with the plan for further ILD workup and management, started on prednisone on admission with gradually improving O2 requirements and stable on 2-3LNC. She underwent bronchoscopy with TBBX on 8/30 which showed Non-Specific Intersitial Disease (NSIP)/OP. Labs notable for positive ARIANA 1:1280, RNP > 8, Alejandro > 8. Patient continued on steroids and received cellcept, which was discontinued 2/2 Afib-RVR. Interval CTA chest on 9/11 also negative PE did show possible lingula pneumonia. Started on empiric vancomycin and zosyn 9/12, course was then c/b episode of SVT to 170s w/ rapidly progressive hypoxemic respiratory failure, placed NRB offered HFNC/BiPAP but refused, leading to worsening hypoxemic respiratory failure requiring intubation on 9/13. Despite best practices, patient remained hypoxemic and patient was cannulated for VV-ECMO on 9/13 and transferred to [facility] for further management. 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 Showed significant lung improvement and was decannulated 9/21. Extubated 9/22.     NEUROLOGY  Home meds: gabapentin 100g TID  AA&Ox3 at baseline   - following commands w/ delirium likely iso prolonged hospitalization, sedation, and ICU setting  - requiring precedex  for anxiety, stopped 9/22  -started seroquel 25 mg qhs but dose may be too much given hypotension and lethargy today, will decrease dose to 12.5mg   - CTH 9/14 no acute findings  - Palliative following  - Social Work consult  - PT/OT following will plan on kandi lift to chair today     PULMONARY  Admitted to [facility] on 8/26 after p/w SOB, found to be hypoxemic, required 2-4L NC/bibap, initially responded well to IV steroids. 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 2/2 bacterial PNA vs atypical PNA vs aspiration vs AEILD continued to show significant improvement from a lung perspective and was decannulated 9/21, and extubated to HFNC 9/22.  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   - 9/20 BAL culture: normal respiratory selvin  - continue duonebs  - s/p empiric abx   - s/p decannulation on 9/21 with plan for suture removal ~7-10 days   - extubated 9/22 to HFNC   - weaned from HFNC to 6L nasal cannula today will continue to titrate down O2     CARDIOVASCULAR  Hx of Afib w at OSH, started on Amiodarone gtt now in shock state requiring requiring pressors likely septic with component of vaspoplegia i/s/o sedation, possible contribution of cardiogenic from RV dysfunction. Now having episodes of SVT responsive to lopressor   - No PE seen on invasive pulmonary angiography at time of ECMO cannulation or in recent imaging  - NO2 weaned off  (9/15) RV function remains unchanged  - s/p milrinone, off since (9/13)  - converted to sinus (9/14) discontinued amio gtt iso bradycardia, back to AF with RVR on 9/19 when sedation weaned off  -currently on metoprolol 12.5mg BID for rate control will increase to 25mg BID today   - BP labile, has received hydralazine IVP for hypertensive episodes w/ anxiety likely a contributing factor, however is also requiring low dose Phenylephrine  for intermittent episodes of hypotension that occurs mostly while asleep.  -restarted phenylephrine overnight will increase midodrine 10mg TID to 20mg TID    - RITCHIE 9/14 : No MEREDITH thrombus noted, negative for PFO. LVOT diameter of 2 cm  - TTE 9/12 with EF 65-70% with normal LV and RV  - TTE 9/14 with  EF 18%. Severe global LV systolic dysfunction. RV enlargement with decreased RV systolic function, however RITCHIE and recent POCUS shows normal LV systolic function, improved RV systolic function   - TTE 9/19 with recovered EF to 67% but still with RV enlargement and systolic dysfunction    GASTROINTESTINAL  Transaminase elevation likely 2/2 combination of shock liver, malposition of ECMO cannula and liver dysfunction  - ALK/ AST/ALT downtrending but remain elevated 490/136/225, T.bili continues to rise 16.3 today  - Acute hepatitis panel negative  - RUQ US 9/15 shows fatty infiltration of liver, negative for hepatobiliary pathology, repeat RUQ US performed 9/22 for worsening LFT's and rising bili with findings negative as well  - Hepatology consulted - likely shock liver with expected delay in improvement in bilirubin  - Hypertriglyceridemia 836, hx of fatty liver and propofol gtt, s/p insulin gtt, now improving  - Last BM noted 9/24  - continue bowel regimen senna and miralax  - CT abdomen 9/15 w/o bowel obstruction, noted with colonic diverticulosis mostly in sigmoid, w/o evidence of diverticulitis  - continue PPI iso steroids   - nutrition following  - failed dysphagia screen x2 currently with KFT in place tolerating tube feeds   -plan for cineesophagogram     RENAL  sCr baseline 0.78  - Creatinine wnl  - s/p diuresis with lasix, last administered 9/15, fluid removal via hemo concentrator while on ECMO circuit, will assess daily if lasix is needed   - s/p Bumex 1mg IVP given to optimize post extubation   - good UO with primafit  - hypernatremia improved, free H2O discontinued 9/21  -IVC small 9/24 with BP 80-90's will give albumin 250cc x2       INFECTIOUS DISEASE  Leukocytosis  - Elevated WBC likely iso filgrastim (initiated 9/17 for leukopenia) as well as s/p ECMO decannulation, however would send cultures if continues to uptrend or febrile  - monitoring off antibiotics for now  - leukopenia now resolved s/p filgrastim (9/17-9/21)  - Bactrim DS discontinued iso leukopenia, continue Mepron for PJP prophylaxis instead  - s/p IV Ceftriaxone 5 days? at [facility] out of an abundance of precaution to cover BAL specimen  - s/p zosyn at OSH (9/12) for lingula PNA  - vancomycin (9/12-9/15 ) d/c'd negative mrsa PCR, and azithro (9/14-9/15) d/c'd neg Urine legionella  - s/p empiric donald (9/13-9/20) now that ANC >1.5  - vanco restarted for ppx on 9/18 d/t leukopenia but stopped on 9/20  - 9/13 and 9/16 urine, sputum and blood cx ngtd  - positive COVID-19 antigen in late August  - f/u BAL, cultures, cytopathology --- so far negative   - ID following- f/u recs    ENDOCRINE  # Hyperglycemia i/s/o steroids  Admission A1c 6.1  - had required insulin gtt currently on NPH 11 with sliding scale will decrease NPH to 5u units and watch glucose closely   - elevated triglycerides 835, has hx of fatty liver, had been on propofol gtt. TG downtrending since initiating insulin gtt for hyperglycemia, now normalizing off propofol  - TSH low initially at 0.13 repeat normal     HEME  #Right IJ thrombus   -s/p argatroban gtt will transition to lovenox today    - INR elevated likely iso argatroban and liver dysfxn, s/p Vit. K (9/14) and FFP x1 (9/15) - now resolved  - platelets x 7 for thrombocytopenia <50,000, last transfused  9/21 continues to improve  - fibrinogen 225,   - Hgb remains stable   - Heme following- f/u recs    #Leukopenia  #Thrombocytopenia  - Heme consult placed for thrombocytopenia, noted to also be leukopenic but now resolved   - unlikely HLH/MAS since many abnormalities can be explained by severe hypoxemia/hypotension during initial decompensation prior to ecmo cannulation but some features that are consistent and with rheumatic disease it is a possibility to f/u hematology regarding utility of further lab/pathologic testing  - peripheral blood smear reviewed: large platelets noted, no platelet clumps, no blast cells noted, neutrophils noted w/ normal number of lobes, nucleated RBC noted  - acute hepatitis panel negative  - s/p filgrastim 480 daily  given ANC >1500 in the setting of possible infxn   - Thrombocytopenia refractory after intermittent transfusions will monitor now off circuit, platelet count continues to improve    #R/O DVT  - LE duplex negative for DVT   - first VA duplex post decannulation 9/21 shows non-occlusive deep vein thrombosis in the right jugular vein and the right cephalic is non-compressible      MSK  Onset of debilitating b/l hand cramps and some muscles weakness x several months, unclear etiology, radiculopathy? vs myositis?   - MRI outpatient reportedly showed cervical spine issues, started on Prednisone shortly before admission at OSH  - myomarker panel + for RNP and Ku  - seen by neurologist at [facility]   - symptoms improved with cellcept (9/8-9/11) but discontinued given Afib RVR   - consider restarting home Gabapentin to assist with neuropathic pain? once more alert and awake  - f/u with Dr. Nik Marie or Dante Urbano for EMG upon discharge (osh?)      RHEUM  - started on Solumedrol 60mg q6h from 9/1 to 9/6 then weaned over time to then Medrol 32 mg 9/9 to 9/12 at [facility]  - s/p Cellcept 9/8 to 9/11 but stopped due to Afib- RVR/tachycardia   - CT findings, Improved/decreased extent of bilateral ground glass opacities and reticular markings compared to the previous study. Findings are nonspecific but differential etiologies include interstitial pneumonia, drug toxicity, nonspecific connective tissue disorders.  - OSH labs show strongly positive ARIANA, RNP, and anti smith antibodies with low C4 and normal C3. Patient with negative SSa and SSb, negative DsDNa, myomarker panel negative (except RNP)  - IL2 receptor elevated 3119.2  - Rheum following  - s/p 1g solumedrol - first dose on (9/13) second dose (9/14) the third and last dose  (9/15) and then solumedrol (1mg/kg) 125mg daily, lowered to 80mg daily (9/19) as per rheum, will consider taper this week  - s/p plasmapheresis x3  (9/15), (9/18), (9/20) for therapeutic option to rapidly remove autoantibodies and inflammatory factors from plasma d/t severe DAH  - s/p rituximab 9/16/23  - will plan for next rituximab dose ~9/30/23       SKIN  Reportedly had single episode of painful rash on her shins, ears and neck and chest which resolved with a steroid cream from a dermatologist prior to admission  - no evidence of rash currently  - right groin with breakdown s/p cannula placement        PROPHYLAXIS  # DVT: argatroban  # GI: TF      LINES  -Ecmo Cannulas 9/13-9/21  -[facility] TLC- 9/13-9/21  -Left Ax Traci - 9/13 (placed at OSH)  -RA 16g PIV x2- 9/15      GOC  -Palliative Following  -full code  -partner Kiara at bedside daily and updated on care

## 2023-09-25 NOTE — PROGRESS NOTE ADULT - SUBJECTIVE AND OBJECTIVE BOX
Interval Events:   -SVT overnight given lopressor 5mg IVP x1  -starte don low dose phenylephrine overnight     HPI:  62 year old female with a past medical history of afib, and sciatica, who presented to [facility] for shortness of breath. She had gone to [facility] where she had CXR which showed bilateral lower lobe infiltrates so was sent to ER. She had been having exertional dyspnea and hypoxemia (on home pulse ox to 82%). She had been having dyspnea for several months and had a workup in [place] with a CT scan (which was negative for PE). She also states that she was seen by a pulmonologist and rheumatology, where they ruled out lupus and other immunological disorders.    [facility] Hospital Course  Found to be hypoxic and have interstitial lung disease appearance on CT, admitted 8/26 for AHRF, further ILD workup and management. TTE 8/26 with normal LVEF. Pt was started on prednisone on admission with gradually improving O2 requirement and has been stable on 2-3LNC since 9/3. Started steroid taper on 9/6 and fully transitioned to PO 9/8 overnight. Started on Mycophenolate mofetil (cellcept) 9/8 evening but pt reported subjective side effects with weakness. Episode of AF w RVR with HR up to 140s on 9/11 am, decreased to HR 10-110s with IV lopressor 10. CTA negative for PE and showed interval improvements in GGO but possible lingular bleb and RML bronchiectasis. Patient received 2L of but before more fluids and beta-blocker pt developed heart palpitations with EKG HR 170s with SVT. BPs 105/70s. Did not respond to vagal maneuvers. Pt given oral lopressor 12.5 and IV lopressor 5, with improvement of HR to 130s. SBP briefly dropped to 60-70s then recovered. Patient on NRB offered HFNC/BiPAP but refused. Started on empiric vancomycin and zosyn. BCx w/ NGTD. Per pulm increased solumedrol to 40mg qd. Patient acceded to HFNC in which she desatted and presented with increased wob for which she was transitioned to BiPAP. Patient with anxiety while on BiPAP presenting tachypnea and desatting to the 80s despite verbal redirection for which she was administered ativan 1mg IVP x1. Patient distress improved with sats in the low 90s but had desaturation to 70s. STAT CXR showed increased pulmonary congestion for which patient was administered lasix without improvement. Intubated and started on mechanical ventilation but required very high PEEP (18) and 100% FiO2 and still had low saturations. VV ECMO team consulted and accepted to [facility]. Per signout patient had RV enlargement and dysfunction on POCUS with concern for either new PE vs high pulmonary pressures due to PEEP 18 and lung dysfunction. Patient went for cannulation at [facility] with flouro showing no acute PE. Cannulated with 25 F drainage cannula in R Fem V and 23F “arterial” cannula in RIJ.    (13 Sep 2023 15:24)      REVIEW OF SYSTEMS:    [x ] All  systems negative  [ ] Unable to assess ROS because ________    OBJECTIVE:  ICU Vital Signs Last 24 Hrs  T(C): 36.8 (25 Sep 2023 04:00), Max: 37.2 (24 Sep 2023 16:00)  T(F): 98.3 (25 Sep 2023 04:00), Max: 99 (24 Sep 2023 16:00)  HR: 86 (25 Sep 2023 06:00) (75 - 97)  BP: --  BP(mean): --  ABP: 89/46 (25 Sep 2023 06:00) (88/49 - 140/74)  ABP(mean): 63 (25 Sep 2023 06:00) (63 - 100)  RR: 24 (25 Sep 2023 06:00) (16 - 32)  SpO2: 98% (25 Sep 2023 06:00) (91% - 100%)    O2 Parameters below as of 25 Sep 2023 04:00  Patient On (Oxygen Delivery Method): nasal cannula, high flow  O2 Flow (L/min): 40  O2 Concentration (%): 40          09-23 @ 07:01  -  09-24 @ 07:00  --------------------------------------------------------  IN: 74.4 mL / OUT: 2580 mL / NET: -2505.6 mL    09-24 @ 07:01  -  09-25 @ 06:34  --------------------------------------------------------  IN: 1558.2 mL / OUT: 1650 mL / NET: -91.8 mL      CAPILLARY BLOOD GLUCOSE      POCT Blood Glucose.: 146 mg/dL (25 Sep 2023 05:24)      Physical Exam:   Constitutional: ill appearing, no acute distress   HEENT: + PERRLA, EOMI, no drainage or redness  Neck: supple,  No JVD  Respiratory: diminished breath Sounds equal & clear bilaterally to auscultation, no accessory muscle use noted  Cardiovascular: Regular rate, regular rhythm, normal S1, S2; no murmurs or rub  Gastrointestinal: Soft, non-tender, non distended, no hepatosplenomegaly, normal bowel sounds  Extremities: + 2 peripheral edema, no cyanosis, no clubbing   Vascular: Equal and normal pulses: 2+ peripheral pulses throughout  Neurological: alert. delirious at times  Psychiatric: calm, normal mood, normal affect  Musculoskeletal: No joint swelling or deformity; no limitation of movement  Skin: warm, dry, well perfused, no rashes    HOSPITAL MEDICATIONS:  Standing Meds:  albuterol/ipratropium for Nebulization 3 milliLiter(s) Nebulizer every 6 hours  argatroban Infusion 0.4 MICROgram(s)/kG/Min IV Continuous <Continuous>  artificial  tears Solution 1 Drop(s) Both EYES every 12 hours  atovaquone  Suspension 1500 milliGRAM(s) Oral daily  chlorhexidine 2% Cloths 1 Application(s) Topical <User Schedule>  dextrose 5%. 1000 milliLiter(s) IV Continuous <Continuous>  dextrose 50% Injectable 25 Gram(s) IV Push once  dextrose 50% Injectable 12.5 Gram(s) IV Push once  dextrose 50% Injectable 25 Gram(s) IV Push once  folic acid 1 milliGRAM(s) Oral daily  glucagon  Injectable 1 milliGRAM(s) IntraMuscular once  insulin lispro (ADMELOG) corrective regimen sliding scale   SubCutaneous every 6 hours  insulin NPH human recombinant 5 Unit(s) SubCutaneous every 6 hours  methylPREDNISolone sodium succinate Injectable 80 milliGRAM(s) IV Push daily  metoprolol tartrate 12.5 milliGRAM(s) Oral two times a day  midodrine 20 milliGRAM(s) Oral every 8 hours  multivitamin/minerals/iron Oral Solution (CENTRUM) 15 milliLiter(s) Oral daily  pantoprazole  Injectable 40 milliGRAM(s) IV Push daily  phenylephrine    Infusion 0.499 MICROgram(s)/kG/Min IV Continuous <Continuous>  polyethylene glycol 3350 17 Gram(s) Oral daily  QUEtiapine 25 milliGRAM(s) Oral at bedtime  senna Syrup 10 milliLiter(s) Oral at bedtime      PRN Meds:  dextrose Oral Gel 15 Gram(s) Oral once PRN      LABS:                        10.4   18.38 )-----------( 143      ( 25 Sep 2023 00:45 )             32.2     Hgb Trend: 10.4<--, 11.7<--, 12.0<--, 12.1<--, 12.9<--  09-25    142  |  104  |  40<H>  ----------------------------<  160<H>  4.2   |  25  |  0.50    Ca    9.7      25 Sep 2023 00:45  Phos  3.0     09-25  Mg     2.40     09-25    TPro  4.7<L>  /  Alb  3.2<L>  /  TBili  15.0<H>  /  DBili  x   /  AST  120<H>  /  ALT  176<H>  /  AlkPhos  536<H>  09-25    Creatinine Trend: 0.50<--, 0.54<--, 0.51<--, 0.48<--, 0.41<--, 0.41<--  PT/INR - ( 25 Sep 2023 00:45 )   PT: 25.6 sec;   INR: 2.34 ratio         PTT - ( 25 Sep 2023 00:45 )  PTT:57.7 sec  Urinalysis Basic - ( 25 Sep 2023 00:45 )    Color: x / Appearance: x / SG: x / pH: x  Gluc: 160 mg/dL / Ketone: x  / Bili: x / Urobili: x   Blood: x / Protein: x / Nitrite: x   Leuk Esterase: x / RBC: x / WBC x   Sq Epi: x / Non Sq Epi: x / Bacteria: x      Arterial Blood Gas:  09-25 @ 00:45  7.47/40/80/29/96.3/5.0  ABG lactate: --  Arterial Blood Gas:  09-24 @ 03:15  7.48/39/74/29/95.8/5.2  ABG lactate: --  Arterial Blood Gas:  09-23 @ 22:15  7.49/38/76/29/96.4/5.4  ABG lactate: --           Interval Events:   -SVT overnight given lopressor 5mg IVP x1  -started on low dose phenylephrine overnight     HPI:  62 year old female with a past medical history of afib, and sciatica, who presented to [facility] [place] for shortness of breath. She had gone to [facility] where she had CXR which showed bilateral lower lobe infiltrates so was sent to ER. She had been having exertional dyspnea and hypoxemia (on home pulse ox to 82%). She had been having dyspnea for several months and had a workup in [place] with a CT scan (which was negative for PE). She also states that she was seen by a pulmonologist and rheumatology, where they ruled out lupus and other immunological disorders.    [facility] Hospital Course  Found to be hypoxic and have interstitial lung disease appearance on CT, admitted 8/26 for AHRF, further ILD workup and management. TTE 8/26 with normal LVEF. Pt was started on prednisone on admission with gradually improving O2 requirement and has been stable on 2-3LNC since 9/3. Started steroid taper on 9/6 and fully transitioned to PO 9/8 overnight. Started on Mycophenolate mofetil (cellcept) 9/8 evening but pt reported subjective side effects with weakness. Episode of AF w RVR with HR up to 140s on 9/11 am, decreased to HR 10-110s with IV lopressor 10. CTA negative for PE and showed interval improvements in GGO but possible lingular bleb and RML bronchiectasis. Patient received 2L of but before more fluids and beta-blocker pt developed heart palpitations with EKG HR 170s with SVT. BPs 105/70s. Did not respond to vagal maneuvers. Pt given oral lopressor 12.5 and IV lopressor 5, with improvement of HR to 130s. SBP briefly dropped to 60-70s then recovered. Patient on NRB offered HFNC/BiPAP but refused. Started on empiric vancomycin and zosyn. BCx w/ NGTD. Per pulm increased solumedrol to 40mg qd. Patient acceded to HFNC in which she desatted and presented with increased wob for which she was transitioned to BiPAP. Patient with anxiety while on BiPAP presenting tachypnea and desatting to the 80s despite verbal redirection for which she was administered ativan 1mg IVP x1. Patient distress improved with sats in the low 90s but had desaturation to 70s. STAT CXR showed increased pulmonary congestion for which patient was administered lasix without improvement. Intubated and started on mechanical ventilation but required very high PEEP (18) and 100% FiO2 and still had low saturations. VV ECMO team consulted and accepted to [facility] center. Per signout patient had RV enlargement and dysfunction on POCUS with concern for either new PE vs high pulmonary pressures due to PEEP 18 and lung dysfunction. Patient went for cannulation at [facility] with flouro showing no acute PE. Cannulated with 25 F drainage cannula in R Fem V and 23F “arterial” cannula in RIJ.    (13 Sep 2023 15:24)      REVIEW OF SYSTEMS:    [x ] All  systems negative  [ ] Unable to assess ROS because ________    OBJECTIVE:  ICU Vital Signs Last 24 Hrs  T(C): 36.8 (25 Sep 2023 04:00), Max: 37.2 (24 Sep 2023 16:00)  T(F): 98.3 (25 Sep 2023 04:00), Max: 99 (24 Sep 2023 16:00)  HR: 86 (25 Sep 2023 06:00) (75 - 97)  BP: --  BP(mean): --  ABP: 89/46 (25 Sep 2023 06:00) (88/49 - 140/74)  ABP(mean): 63 (25 Sep 2023 06:00) (63 - 100)  RR: 24 (25 Sep 2023 06:00) (16 - 32)  SpO2: 98% (25 Sep 2023 06:00) (91% - 100%)    O2 Parameters below as of 25 Sep 2023 04:00  Patient On (Oxygen Delivery Method): nasal cannula, high flow  O2 Flow (L/min): 40  O2 Concentration (%): 40          09-23 @ 07:01  -  09-24 @ 07:00  --------------------------------------------------------  IN: 74.4 mL / OUT: 2580 mL / NET: -2505.6 mL    09-24 @ 07:01  -  09-25 @ 06:34  --------------------------------------------------------  IN: 1558.2 mL / OUT: 1650 mL / NET: -91.8 mL      CAPILLARY BLOOD GLUCOSE      POCT Blood Glucose.: 146 mg/dL (25 Sep 2023 05:24)      Physical Exam:   Constitutional: ill appearing, no acute distress   HEENT: + PERRLA, EOMI, no drainage or redness  Neck: supple,  No JVD  Respiratory: diminished breath Sounds equal & clear bilaterally to auscultation, no accessory muscle use noted  Cardiovascular: Regular rate, regular rhythm, normal S1, S2; no murmurs or rub  Gastrointestinal: Soft, non-tender, non distended, no hepatosplenomegaly, normal bowel sounds  Extremities: + 2 peripheral edema, no cyanosis, no clubbing   Vascular: Equal and normal pulses: 2+ peripheral pulses throughout  Neurological: alert. delirious at times  Psychiatric: calm, normal mood, normal affect  Musculoskeletal: No joint swelling or deformity; no limitation of movement  Skin: warm, dry, well perfused, no rashes    HOSPITAL MEDICATIONS:  Standing Meds:  albuterol/ipratropium for Nebulization 3 milliLiter(s) Nebulizer every 6 hours  argatroban Infusion 0.4 MICROgram(s)/kG/Min IV Continuous <Continuous>  artificial  tears Solution 1 Drop(s) Both EYES every 12 hours  atovaquone  Suspension 1500 milliGRAM(s) Oral daily  chlorhexidine 2% Cloths 1 Application(s) Topical <User Schedule>  dextrose 5%. 1000 milliLiter(s) IV Continuous <Continuous>  dextrose 50% Injectable 25 Gram(s) IV Push once  dextrose 50% Injectable 12.5 Gram(s) IV Push once  dextrose 50% Injectable 25 Gram(s) IV Push once  folic acid 1 milliGRAM(s) Oral daily  glucagon  Injectable 1 milliGRAM(s) IntraMuscular once  insulin lispro (ADMELOG) corrective regimen sliding scale   SubCutaneous every 6 hours  insulin NPH human recombinant 5 Unit(s) SubCutaneous every 6 hours  methylPREDNISolone sodium succinate Injectable 80 milliGRAM(s) IV Push daily  metoprolol tartrate 12.5 milliGRAM(s) Oral two times a day  midodrine 20 milliGRAM(s) Oral every 8 hours  multivitamin/minerals/iron Oral Solution (CENTRUM) 15 milliLiter(s) Oral daily  pantoprazole  Injectable 40 milliGRAM(s) IV Push daily  phenylephrine    Infusion 0.499 MICROgram(s)/kG/Min IV Continuous <Continuous>  polyethylene glycol 3350 17 Gram(s) Oral daily  QUEtiapine 25 milliGRAM(s) Oral at bedtime  senna Syrup 10 milliLiter(s) Oral at bedtime      PRN Meds:  dextrose Oral Gel 15 Gram(s) Oral once PRN      LABS:                        10.4   18.38 )-----------( 143      ( 25 Sep 2023 00:45 )             32.2     Hgb Trend: 10.4<--, 11.7<--, 12.0<--, 12.1<--, 12.9<--  09-25    142  |  104  |  40<H>  ----------------------------<  160<H>  4.2   |  25  |  0.50    Ca    9.7      25 Sep 2023 00:45  Phos  3.0     09-25  Mg     2.40     09-25    TPro  4.7<L>  /  Alb  3.2<L>  /  TBili  15.0<H>  /  DBili  x   /  AST  120<H>  /  ALT  176<H>  /  AlkPhos  536<H>  09-25    Creatinine Trend: 0.50<--, 0.54<--, 0.51<--, 0.48<--, 0.41<--, 0.41<--  PT/INR - ( 25 Sep 2023 00:45 )   PT: 25.6 sec;   INR: 2.34 ratio         PTT - ( 25 Sep 2023 00:45 )  PTT:57.7 sec  Urinalysis Basic - ( 25 Sep 2023 00:45 )    Color: x / Appearance: x / SG: x / pH: x  Gluc: 160 mg/dL / Ketone: x  / Bili: x / Urobili: x   Blood: x / Protein: x / Nitrite: x   Leuk Esterase: x / RBC: x / WBC x   Sq Epi: x / Non Sq Epi: x / Bacteria: x      Arterial Blood Gas:  09-25 @ 00:45  7.47/40/80/29/96.3/5.0  ABG lactate: --  Arterial Blood Gas:  09-24 @ 03:15  7.48/39/74/29/95.8/5.2  ABG lactate: --  Arterial Blood Gas:  09-23 @ 22:15  7.49/38/76/29/96.4/5.4  ABG lactate: --

## 2023-09-25 NOTE — SWALLOW BEDSIDE ASSESSMENT ADULT - ASR SWALLOW REFERRAL
would recommend ENT consult should patient's vocal quality not improve spontaneously would recommend ENT consult should patient's vocal quality not improve spontaneously/Registered Dietitian

## 2023-09-26 DIAGNOSIS — R13.10 DYSPHAGIA, UNSPECIFIED: ICD-10-CM

## 2023-09-26 DIAGNOSIS — R53.1 WEAKNESS: ICD-10-CM

## 2023-09-26 LAB
ALBUMIN SERPL ELPH-MCNC: 3 G/DL — LOW (ref 3.3–5)
ALP SERPL-CCNC: 678 U/L — HIGH (ref 40–120)
ALT FLD-CCNC: 243 U/L — HIGH (ref 4–33)
ANION GAP SERPL CALC-SCNC: 10 MMOL/L — SIGNIFICANT CHANGE UP (ref 7–14)
ANISOCYTOSIS BLD QL: SLIGHT — SIGNIFICANT CHANGE UP
APTT BLD: 45.9 SEC — HIGH (ref 24.5–35.6)
AST SERPL-CCNC: 223 U/L — HIGH (ref 4–32)
BASOPHILS # BLD AUTO: 0 K/UL — SIGNIFICANT CHANGE UP (ref 0–0.2)
BASOPHILS NFR BLD AUTO: 0 % — SIGNIFICANT CHANGE UP (ref 0–2)
BILIRUB SERPL-MCNC: 14.3 MG/DL — HIGH (ref 0.2–1.2)
BLOOD GAS ARTERIAL - LYTES,HGB,ICA,LACT RESULT: SIGNIFICANT CHANGE UP
BLOOD GAS ARTERIAL COMPREHENSIVE RESULT: SIGNIFICANT CHANGE UP
BUN SERPL-MCNC: 34 MG/DL — HIGH (ref 7–23)
CALCIUM SERPL-MCNC: 9.7 MG/DL — SIGNIFICANT CHANGE UP (ref 8.4–10.5)
CHLORIDE SERPL-SCNC: 106 MMOL/L — SIGNIFICANT CHANGE UP (ref 98–107)
CK MB BLD-MCNC: 12.7 % — HIGH (ref 0–2.5)
CK MB CFR SERPL CALC: 4.7 NG/ML — SIGNIFICANT CHANGE UP
CK SERPL-CCNC: 37 U/L — SIGNIFICANT CHANGE UP (ref 25–170)
CK SERPL-CCNC: 50 U/L — SIGNIFICANT CHANGE UP (ref 25–170)
CO2 SERPL-SCNC: 29 MMOL/L — SIGNIFICANT CHANGE UP (ref 22–31)
CREAT SERPL-MCNC: 0.49 MG/DL — LOW (ref 0.5–1.3)
DACRYOCYTES BLD QL SMEAR: SLIGHT — SIGNIFICANT CHANGE UP
EGFR: 105 ML/MIN/1.73M2 — SIGNIFICANT CHANGE UP
EOSINOPHIL # BLD AUTO: 0 K/UL — SIGNIFICANT CHANGE UP (ref 0–0.5)
EOSINOPHIL NFR BLD AUTO: 0 % — SIGNIFICANT CHANGE UP (ref 0–6)
GIANT PLATELETS BLD QL SMEAR: PRESENT — SIGNIFICANT CHANGE UP
GLUCOSE BLDC GLUCOMTR-MCNC: 134 MG/DL — HIGH (ref 70–99)
GLUCOSE BLDC GLUCOMTR-MCNC: 138 MG/DL — HIGH (ref 70–99)
GLUCOSE BLDC GLUCOMTR-MCNC: 212 MG/DL — HIGH (ref 70–99)
GLUCOSE BLDC GLUCOMTR-MCNC: 253 MG/DL — HIGH (ref 70–99)
GLUCOSE SERPL-MCNC: 162 MG/DL — HIGH (ref 70–99)
HCT VFR BLD CALC: 32.5 % — LOW (ref 34.5–45)
HGB BLD-MCNC: 10.4 G/DL — LOW (ref 11.5–15.5)
IANC: 15.96 K/UL — HIGH (ref 1.8–7.4)
IL2 FLD-MCNC: <31.2 PG/ML — SIGNIFICANT CHANGE UP (ref 0–31.2)
INR BLD: 1.36 RATIO — HIGH (ref 0.85–1.18)
LYMPHOCYTES # BLD AUTO: 0.68 K/UL — LOW (ref 1–3.3)
LYMPHOCYTES # BLD AUTO: 3.6 % — LOW (ref 13–44)
MACROCYTES BLD QL: SLIGHT — SIGNIFICANT CHANGE UP
MAGNESIUM SERPL-MCNC: 2.3 MG/DL — SIGNIFICANT CHANGE UP (ref 1.6–2.6)
MANUAL SMEAR VERIFICATION: SIGNIFICANT CHANGE UP
MCHC RBC-ENTMCNC: 31.1 PG — SIGNIFICANT CHANGE UP (ref 27–34)
MCHC RBC-ENTMCNC: 32 GM/DL — SIGNIFICANT CHANGE UP (ref 32–36)
MCV RBC AUTO: 97.3 FL — SIGNIFICANT CHANGE UP (ref 80–100)
MONOCYTES # BLD AUTO: 1.02 K/UL — HIGH (ref 0–0.9)
MONOCYTES NFR BLD AUTO: 5.4 % — SIGNIFICANT CHANGE UP (ref 2–14)
MYELOCYTES NFR BLD: 2.7 % — HIGH (ref 0–0)
NEUTROPHILS # BLD AUTO: 16.64 K/UL — HIGH (ref 1.8–7.4)
NEUTROPHILS NFR BLD AUTO: 87.4 % — HIGH (ref 43–77)
NEUTS BAND # BLD: 0.9 % — SIGNIFICANT CHANGE UP (ref 0–6)
NRBC # BLD: 2 /100 — HIGH (ref 0–0)
OVALOCYTES BLD QL SMEAR: SLIGHT — SIGNIFICANT CHANGE UP
PHOSPHATE SERPL-MCNC: 2 MG/DL — LOW (ref 2.5–4.5)
PLAT MORPH BLD: NORMAL — SIGNIFICANT CHANGE UP
PLATELET # BLD AUTO: 172 K/UL — SIGNIFICANT CHANGE UP (ref 150–400)
PLATELET COUNT - ESTIMATE: NORMAL — SIGNIFICANT CHANGE UP
POIKILOCYTOSIS BLD QL AUTO: SLIGHT — SIGNIFICANT CHANGE UP
POLYCHROMASIA BLD QL SMEAR: SLIGHT — SIGNIFICANT CHANGE UP
POTASSIUM SERPL-MCNC: 4.2 MMOL/L — SIGNIFICANT CHANGE UP (ref 3.5–5.3)
POTASSIUM SERPL-SCNC: 4.2 MMOL/L — SIGNIFICANT CHANGE UP (ref 3.5–5.3)
PROT SERPL-MCNC: 4.8 G/DL — LOW (ref 6–8.3)
PROTHROM AB SERPL-ACNC: 15.1 SEC — HIGH (ref 9.5–13)
RBC # BLD: 3.34 M/UL — LOW (ref 3.8–5.2)
RBC # FLD: 22.4 % — HIGH (ref 10.3–14.5)
RBC BLD AUTO: ABNORMAL
SMUDGE CELLS # BLD: PRESENT — SIGNIFICANT CHANGE UP
SODIUM SERPL-SCNC: 145 MMOL/L — SIGNIFICANT CHANGE UP (ref 135–145)
TROPONIN T, HIGH SENSITIVITY RESULT: 151 NG/L — CRITICAL HIGH
TROPONIN T, HIGH SENSITIVITY RESULT: 89 NG/L — CRITICAL HIGH
WBC # BLD: 18.84 K/UL — HIGH (ref 3.8–10.5)
WBC # FLD AUTO: 18.84 K/UL — HIGH (ref 3.8–10.5)

## 2023-09-26 PROCEDURE — 99291 CRITICAL CARE FIRST HOUR: CPT | Mod: 25

## 2023-09-26 PROCEDURE — 76604 US EXAM CHEST: CPT | Mod: 26,GC

## 2023-09-26 PROCEDURE — 93308 TTE F-UP OR LMTD: CPT | Mod: 26,GC

## 2023-09-26 PROCEDURE — 99233 SBSQ HOSP IP/OBS HIGH 50: CPT

## 2023-09-26 RX ORDER — SODIUM CHLORIDE 9 MG/ML
1000 INJECTION, SOLUTION INTRAVENOUS
Refills: 0 | Status: DISCONTINUED | OUTPATIENT
Start: 2023-09-26 | End: 2023-09-27

## 2023-09-26 RX ADMIN — Medication 25 MILLIGRAM(S): at 17:30

## 2023-09-26 RX ADMIN — Medication 80 MILLIGRAM(S): at 06:11

## 2023-09-26 RX ADMIN — Medication 1 MILLIGRAM(S): at 13:09

## 2023-09-26 RX ADMIN — ATOVAQUONE 1500 MILLIGRAM(S): 750 SUSPENSION ORAL at 13:09

## 2023-09-26 RX ADMIN — Medication 3 MILLILITER(S): at 04:01

## 2023-09-26 RX ADMIN — SODIUM CHLORIDE 75 MILLILITER(S): 9 INJECTION, SOLUTION INTRAVENOUS at 09:08

## 2023-09-26 RX ADMIN — Medication 1 DROP(S): at 17:29

## 2023-09-26 RX ADMIN — HUMAN INSULIN 5 UNIT(S): 100 INJECTION, SUSPENSION SUBCUTANEOUS at 17:28

## 2023-09-26 RX ADMIN — Medication 2: at 12:34

## 2023-09-26 RX ADMIN — HUMAN INSULIN 5 UNIT(S): 100 INJECTION, SUSPENSION SUBCUTANEOUS at 12:34

## 2023-09-26 RX ADMIN — CHLORHEXIDINE GLUCONATE 1 APPLICATION(S): 213 SOLUTION TOPICAL at 06:12

## 2023-09-26 RX ADMIN — Medication 25 MILLIGRAM(S): at 06:12

## 2023-09-26 RX ADMIN — Medication 3 MILLILITER(S): at 15:26

## 2023-09-26 RX ADMIN — Medication 1 DROP(S): at 06:22

## 2023-09-26 RX ADMIN — HUMAN INSULIN 5 UNIT(S): 100 INJECTION, SUSPENSION SUBCUTANEOUS at 00:23

## 2023-09-26 RX ADMIN — HUMAN INSULIN 5 UNIT(S): 100 INJECTION, SUSPENSION SUBCUTANEOUS at 06:11

## 2023-09-26 RX ADMIN — Medication 3: at 17:28

## 2023-09-26 RX ADMIN — PANTOPRAZOLE SODIUM 40 MILLIGRAM(S): 20 TABLET, DELAYED RELEASE ORAL at 13:09

## 2023-09-26 RX ADMIN — MIDODRINE HYDROCHLORIDE 20 MILLIGRAM(S): 2.5 TABLET ORAL at 13:10

## 2023-09-26 RX ADMIN — Medication 3 MILLILITER(S): at 09:33

## 2023-09-26 RX ADMIN — MIDODRINE HYDROCHLORIDE 20 MILLIGRAM(S): 2.5 TABLET ORAL at 06:12

## 2023-09-26 RX ADMIN — ENOXAPARIN SODIUM 120 MILLIGRAM(S): 100 INJECTION SUBCUTANEOUS at 22:38

## 2023-09-26 RX ADMIN — Medication 85 MILLIMOLE(S): at 11:30

## 2023-09-26 RX ADMIN — MIDODRINE HYDROCHLORIDE 20 MILLIGRAM(S): 2.5 TABLET ORAL at 22:39

## 2023-09-26 RX ADMIN — ENOXAPARIN SODIUM 120 MILLIGRAM(S): 100 INJECTION SUBCUTANEOUS at 09:49

## 2023-09-26 RX ADMIN — Medication 3 MILLILITER(S): at 21:49

## 2023-09-26 RX ADMIN — Medication 15 MILLILITER(S): at 13:09

## 2023-09-26 RX ADMIN — SENNA PLUS 10 MILLILITER(S): 8.6 TABLET ORAL at 22:39

## 2023-09-26 NOTE — PROGRESS NOTE ADULT - SUBJECTIVE AND OBJECTIVE BOX
Interval Events:  -Placed on HFNC 40/40 overnight, fio2 increased to 50%    HPI:  62 year old female with a past medical history of afib, and sciatica, who presented to [facility] for shortness of breath. She had gone to [facility] where she had CXR which showed bilateral lower lobe infiltrates so was sent to ER. She had been having exertional dyspnea and hypoxemia (on home pulse ox to 82%). She had been having dyspnea for several months and had a workup in [place] with a CT scan (which was negative for PE). She also states that she was seen by a pulmonologist and rheumatology, where they ruled out lupus and other immunological disorders.    [facility] Hospital Course  Found to be hypoxic and have interstitial lung disease appearance on CT, admitted 8/26 for AHRF, further ILD workup and management. TTE 8/26 with normal LVEF. Pt was started on prednisone on admission with gradually improving O2 requirement and has been stable on 2-3LNC since 9/3. Started steroid taper on 9/6 and fully transitioned to PO 9/8 overnight. Started on Mycophenolate mofetil (cellcept) 9/8 evening but pt reported subjective side effects with weakness. Episode of AF w RVR with HR up to 140s on 9/11 am, decreased to HR 10-110s with IV lopressor 10. CTA negative for PE and showed interval improvements in GGO but possible lingular bleb and RML bronchiectasis. Patient received 2L of but before more fluids and beta-blocker pt developed heart palpitations with EKG HR 170s with SVT. BPs 105/70s. Did not respond to vagal maneuvers. Pt given oral lopressor 12.5 and IV lopressor 5, with improvement of HR to 130s. SBP briefly dropped to 60-70s then recovered. Patient on NRB offered HFNC/BiPAP but refused. Started on empiric vancomycin and zosyn. BCx w/ NGTD. Per pulm increased solumedrol to 40mg qd. Patient acceded to HFNC in which she desatted and presented with increased wob for which she was transitioned to BiPAP. Patient with anxiety while on BiPAP presenting tachypnea and desatting to the 80s despite verbal redirection for which she was administered ativan 1mg IVP x1. Patient distress improved with sats in the low 90s but had desaturation to 70s. STAT CXR showed increased pulmonary congestion for which patient was administered lasix without improvement. Intubated and started on mechanical ventilation but required very high PEEP (18) and 100% FiO2 and still had low saturations. VV ECMO team consulted and accepted to [facility]. Per signout patient had RV enlargement and dysfunction on POCUS with concern for either new PE vs high pulmonary pressures due to PEEP 18 and lung dysfunction. Patient went for cannulation at [facility] with flouro showing no acute PE. Cannulated with 25 F drainage cannula in R Fem V and 23F “arterial” cannula in RIJ.    (13 Sep 2023 15:24)      REVIEW OF SYSTEMS:  [ ] All other systems negative  [ ] Unable to assess ROS because ________    OBJECTIVE:  ICU Vital Signs Last 24 Hrs  T(C): 37.1 (26 Sep 2023 00:00), Max: 37.2 (25 Sep 2023 12:00)  T(F): 98.8 (26 Sep 2023 00:00), Max: 99 (25 Sep 2023 12:00)  HR: 94 (26 Sep 2023 04:01) (72 - 98)  BP: --  BP(mean): --  ABP: 111/54 (26 Sep 2023 04:00) (86/44 - 127/69)  ABP(mean): 76 (26 Sep 2023 04:00) (61 - 92)  RR: 36 (26 Sep 2023 04:00) (19 - 36)  SpO2: 93% (26 Sep 2023 04:01) (88% - 100%)    O2 Parameters below as of 26 Sep 2023 04:01  Patient On (Oxygen Delivery Method): nasal cannula, high flow              09-24 @ 07:01  -  09-25 @ 07:00  --------------------------------------------------------  IN: 1572.9 mL / OUT: 1650 mL / NET: -77.1 mL    09-25 @ 07:01  -  09-26 @ 06:20  --------------------------------------------------------  IN: 1247.7 mL / OUT: 1400 mL / NET: -152.3 mL      CAPILLARY BLOOD GLUCOSE      POCT Blood Glucose.: 138 mg/dL (26 Sep 2023 05:35)    Physical Exam:   Constitutional: ill appearing, no acute distress   HEENT: + PERRLA, EOMI, no drainage or redness  Neck: supple,  No JVD  Respiratory: diminished breath Sounds equal bilaterally to auscultation, no accessory muscle use noted  Cardiovascular: Regular rate, regular rhythm, normal S1, S2; no murmurs or rub  Gastrointestinal: Soft, non-tender, distended, no hepatosplenomegaly, normal bowel sounds  Extremities: + 2 peripheral edema, no cyanosis, no clubbing   Vascular: Equal and normal pulses: 2+ peripheral pulses throughout  Neurological: alert. delirious at times  Psychiatric: calm, normal mood, normal affect  Musculoskeletal: No joint swelling or deformity; no limitation of movement, weakness noted upper and lower ext.  Skin: warm, dry, well perfused, no rashes      HOSPITAL MEDICATIONS:  Standing Meds:  albuterol/ipratropium for Nebulization 3 milliLiter(s) Nebulizer every 6 hours  artificial  tears Solution 1 Drop(s) Both EYES every 12 hours  atovaquone  Suspension 1500 milliGRAM(s) Oral daily  chlorhexidine 2% Cloths 1 Application(s) Topical <User Schedule>  dextrose 5%. 1000 milliLiter(s) IV Continuous <Continuous>  dextrose 50% Injectable 12.5 Gram(s) IV Push once  dextrose 50% Injectable 25 Gram(s) IV Push once  dextrose 50% Injectable 25 Gram(s) IV Push once  enoxaparin Injectable 120 milliGRAM(s) SubCutaneous every 12 hours  folic acid 1 milliGRAM(s) Oral daily  glucagon  Injectable 1 milliGRAM(s) IntraMuscular once  insulin lispro (ADMELOG) corrective regimen sliding scale   SubCutaneous every 6 hours  insulin NPH human recombinant 5 Unit(s) SubCutaneous every 6 hours  methylPREDNISolone sodium succinate Injectable 80 milliGRAM(s) IV Push daily  metoprolol tartrate 25 milliGRAM(s) Oral two times a day  midodrine 20 milliGRAM(s) Oral every 8 hours  multivitamin/minerals/iron Oral Solution (CENTRUM) 15 milliLiter(s) Oral daily  pantoprazole  Injectable 40 milliGRAM(s) IV Push daily  senna Syrup 10 milliLiter(s) Oral at bedtime      PRN Meds:  dextrose Oral Gel 15 Gram(s) Oral once PRN      LABS:                        10.4   18.84 )-----------( 172      ( 26 Sep 2023 03:55 )             32.5     Hgb Trend: 10.4<--, 10.4<--, 11.7<--, 12.0<--, 12.1<--  09-26    145  |  106  |  34<H>  ----------------------------<  162<H>  4.2   |  29  |  0.49<L>    Ca    9.7      26 Sep 2023 03:55  Phos  2.0     09-26  Mg     2.30     09-26    TPro  4.8<L>  /  Alb  3.0<L>  /  TBili  14.3<H>  /  DBili  x   /  AST  223<H>  /  ALT  243<H>  /  AlkPhos  678<H>  09-26    Creatinine Trend: 0.49<--, 0.50<--, 0.54<--, 0.51<--, 0.48<--, 0.41<--  PT/INR - ( 26 Sep 2023 03:55 )   PT: 15.1 sec;   INR: 1.36 ratio         PTT - ( 26 Sep 2023 03:55 )  PTT:45.9 sec  Urinalysis Basic - ( 26 Sep 2023 03:55 )    Color: x / Appearance: x / SG: x / pH: x  Gluc: 162 mg/dL / Ketone: x  / Bili: x / Urobili: x   Blood: x / Protein: x / Nitrite: x   Leuk Esterase: x / RBC: x / WBC x   Sq Epi: x / Non Sq Epi: x / Bacteria: x      Arterial Blood Gas:  09-26 @ 03:55  7.51/36/98/29/98.5/5.5  ABG lactate: --  Arterial Blood Gas:  09-25 @ 17:30  7.47/39/62/28/91.2/4.4  ABG lactate: --  Arterial Blood Gas:  09-25 @ 00:45  7.47/40/80/29/96.3/5.0  ABG lactate: --        MICROBIOLOGY:     RADIOLOGY:  [ ] Reviewed and interpreted by me           Interval Events:  -Placed on HFNC 40/40 overnight, fio2 increased to 50%    HPI:  62 year old female with a past medical history of afib, and sciatica, who presented to [facility] for shortness of breath. She had gone to [facility] where she had CXR which showed bilateral lower lobe infiltrates so was sent to ER. She had been having exertional dyspnea and hypoxemia (on home pulse ox to 82%). She had been having dyspnea for several months and had a workup in [place] with a CT scan (which was negative for PE). She also states that she was seen by a pulmonologist and rheumatology, where they ruled out lupus and other immunological disorders.    [facility] Hospital Course  Found to be hypoxic and have interstitial lung disease appearance on CT, admitted 8/26 for AHRF, further ILD workup and management. TTE 8/26 with normal LVEF. Pt was started on prednisone on admission with gradually improving O2 requirement and has been stable on 2-3LNC since 9/3. Started steroid taper on 9/6 and fully transitioned to PO 9/8 overnight. Started on Mycophenolate mofetil (cellcept) 9/8 evening but pt reported subjective side effects with weakness. Episode of AF w RVR with HR up to 140s on 9/11 am, decreased to HR 10-110s with IV lopressor 10. CTA negative for PE and showed interval improvements in GGO but possible lingular bleb and RML bronchiectasis. Patient received 2L of but before more fluids and beta-blocker pt developed heart palpitations with EKG HR 170s with SVT. BPs 105/70s. Did not respond to vagal maneuvers. Pt given oral lopressor 12.5 and IV lopressor 5, with improvement of HR to 130s. SBP briefly dropped to 60-70s then recovered. Patient on NRB offered HFNC/BiPAP but refused. Started on empiric vancomycin and zosyn. BCx w/ NGTD. Per pulm increased solumedrol to 40mg qd. Patient acceded to HFNC in which she desatted and presented with increased wob for which she was transitioned to BiPAP. Patient with anxiety while on BiPAP presenting tachypnea and desatting to the 80s despite verbal redirection for which she was administered ativan 1mg IVP x1. Patient distress improved with sats in the low 90s but had desaturation to 70s. STAT CXR showed increased pulmonary congestion for which patient was administered lasix without improvement. Intubated and started on mechanical ventilation but required very high PEEP (18) and 100% FiO2 and still had low saturations. VV ECMO team consulted and accepted to [facility]. Per signout patient had RV enlargement and dysfunction on POCUS with concern for either new PE vs high pulmonary pressures due to PEEP 18 and lung dysfunction. Patient went for cannulation at [facility] with flouro showing no acute PE. Cannulated with 25 F drainage cannula in R Fem V and 23F “arterial” cannula in RIJ.    (13 Sep 2023 15:24)      REVIEW OF SYSTEMS:  [x] All other systems negative  [ ] Unable to assess ROS because ________    OBJECTIVE:  ICU Vital Signs Last 24 Hrs  T(C): 37.1 (26 Sep 2023 00:00), Max: 37.2 (25 Sep 2023 12:00)  T(F): 98.8 (26 Sep 2023 00:00), Max: 99 (25 Sep 2023 12:00)  HR: 94 (26 Sep 2023 04:01) (72 - 98)  BP: --  BP(mean): --  ABP: 111/54 (26 Sep 2023 04:00) (86/44 - 127/69)  ABP(mean): 76 (26 Sep 2023 04:00) (61 - 92)  RR: 36 (26 Sep 2023 04:00) (19 - 36)  SpO2: 93% (26 Sep 2023 04:01) (88% - 100%)    O2 Parameters below as of 26 Sep 2023 04:01  Patient On (Oxygen Delivery Method): nasal cannula, high flow              09-24 @ 07:01  -  09-25 @ 07:00  --------------------------------------------------------  IN: 1572.9 mL / OUT: 1650 mL / NET: -77.1 mL    09-25 @ 07:01  -  09-26 @ 06:20  --------------------------------------------------------  IN: 1247.7 mL / OUT: 1400 mL / NET: -152.3 mL      CAPILLARY BLOOD GLUCOSE      POCT Blood Glucose.: 138 mg/dL (26 Sep 2023 05:35)    Physical Exam:   Constitutional: ill appearing, no acute distress   HEENT: + PERRLA, EOMI, no drainage or redness  Neck: supple,  No JVD  Respiratory: diminished breath Sounds equal bilaterally to auscultation, no accessory muscle use noted  Cardiovascular: Regular rate, regular rhythm, normal S1, S2; no murmurs or rub  Gastrointestinal: Soft, non-tender, distended, no hepatosplenomegaly, normal bowel sounds  Extremities: + 2 peripheral edema, no cyanosis, no clubbing   Vascular: Equal and normal pulses: 2+ peripheral pulses throughout  Neurological: alert. delirious at times  Psychiatric: calm, normal mood, normal affect  Musculoskeletal: No joint swelling or deformity; no limitation of movement, weakness noted upper and lower ext.  Skin: warm, dry, well perfused, no rashes      HOSPITAL MEDICATIONS:  Standing Meds:  albuterol/ipratropium for Nebulization 3 milliLiter(s) Nebulizer every 6 hours  artificial  tears Solution 1 Drop(s) Both EYES every 12 hours  atovaquone  Suspension 1500 milliGRAM(s) Oral daily  chlorhexidine 2% Cloths 1 Application(s) Topical <User Schedule>  dextrose 5%. 1000 milliLiter(s) IV Continuous <Continuous>  dextrose 50% Injectable 12.5 Gram(s) IV Push once  dextrose 50% Injectable 25 Gram(s) IV Push once  dextrose 50% Injectable 25 Gram(s) IV Push once  enoxaparin Injectable 120 milliGRAM(s) SubCutaneous every 12 hours  folic acid 1 milliGRAM(s) Oral daily  glucagon  Injectable 1 milliGRAM(s) IntraMuscular once  insulin lispro (ADMELOG) corrective regimen sliding scale   SubCutaneous every 6 hours  insulin NPH human recombinant 5 Unit(s) SubCutaneous every 6 hours  methylPREDNISolone sodium succinate Injectable 80 milliGRAM(s) IV Push daily  metoprolol tartrate 25 milliGRAM(s) Oral two times a day  midodrine 20 milliGRAM(s) Oral every 8 hours  multivitamin/minerals/iron Oral Solution (CENTRUM) 15 milliLiter(s) Oral daily  pantoprazole  Injectable 40 milliGRAM(s) IV Push daily  senna Syrup 10 milliLiter(s) Oral at bedtime      PRN Meds:  dextrose Oral Gel 15 Gram(s) Oral once PRN      LABS:                        10.4   18.84 )-----------( 172      ( 26 Sep 2023 03:55 )             32.5     Hgb Trend: 10.4<--, 10.4<--, 11.7<--, 12.0<--, 12.1<--  09-26    145  |  106  |  34<H>  ----------------------------<  162<H>  4.2   |  29  |  0.49<L>    Ca    9.7      26 Sep 2023 03:55  Phos  2.0     09-26  Mg     2.30     09-26    TPro  4.8<L>  /  Alb  3.0<L>  /  TBili  14.3<H>  /  DBili  x   /  AST  223<H>  /  ALT  243<H>  /  AlkPhos  678<H>  09-26    Creatinine Trend: 0.49<--, 0.50<--, 0.54<--, 0.51<--, 0.48<--, 0.41<--  PT/INR - ( 26 Sep 2023 03:55 )   PT: 15.1 sec;   INR: 1.36 ratio         PTT - ( 26 Sep 2023 03:55 )  PTT:45.9 sec  Urinalysis Basic - ( 26 Sep 2023 03:55 )    Color: x / Appearance: x / SG: x / pH: x  Gluc: 162 mg/dL / Ketone: x  / Bili: x / Urobili: x   Blood: x / Protein: x / Nitrite: x   Leuk Esterase: x / RBC: x / WBC x   Sq Epi: x / Non Sq Epi: x / Bacteria: x      Arterial Blood Gas:  09-26 @ 03:55  7.51/36/98/29/98.5/5.5  ABG lactate: --  Arterial Blood Gas:  09-25 @ 17:30  7.47/39/62/28/91.2/4.4  ABG lactate: --  Arterial Blood Gas:  09-25 @ 00:45  7.47/40/80/29/96.3/5.0  ABG lactate: --        MICROBIOLOGY:     RADIOLOGY:  [ ] Reviewed and interpreted by me

## 2023-09-26 NOTE — PROGRESS NOTE ADULT - PROBLEM SELECTOR PLAN 3
PPSV 10%  Needs assistance with all ADLs  Skin care, frequent repositioning  High risk for complications.
With NG tube and tube feeds  Plan is to repeat s&s and give time for pt to recover  weak after ECMO and intubation

## 2023-09-26 NOTE — PROGRESS NOTE ADULT - PROBLEM SELECTOR PLAN 4
PPSV 30-40%  Needs assistance with most ADLs  Skin care  Frequent repositioning  High risk of further decline given comorbidities.
Pt is full code  Goal is to continue with all aggressive medical care  Will continue to follow and provide support  Page for uncontrolled symptoms [id number].

## 2023-09-26 NOTE — PROGRESS NOTE ADULT - PROBLEM SELECTOR PLAN 1
Next of kin: Partner of 20+years Kiara Delgado. Copy of HCP printed and in the chart.   No adult children, one sister who is aware of her condition, she resides in [place].   Functional status prior to ECMO: working even while at [facility]. Highly functional.   Support: Partner, sister residing in [place]. Per partner she's very well loved and supported by family and friends.   Referrals:  [ x] SW: See [facility] note   [ x] Chaplaincy  [ ] Child life  [x ] Caregiver support group    Met with partner Kiara at bedside on 9/15. At the time role of Palliative Care in the setting of vvECMO was introduced. Partner has insight and understanding of the severity of illness involved and the complexities of ECMO therapy as well as uncertainty of outcomes.   Questions answered, support provided.  Discussed with ECMO team, and nursing staff
Off ECMO     Next of kin: Partner of 20+years Kiara Delgado. Copy of HCP printed and in the chart.   No adult children, one sister who is aware of her condition, she resides in [place].   Functional status prior to ECMO: working even while admitted at [facility]. Highly functional.   Support: Partner, sister residing in [place]. Per partner she's very well loved and supported by family and friends.   Referrals:  [ x] SW: See [facility] note   [ x] Chaplaincy  [ ] Child life  [x ] Caregiver support group    Met with partner Kiara at bedside on 9/15. At the time role of Palliative Care in the setting of vvECMO was introduced. Partner has insight and understanding of the severity of illness involved and the complexities of ECMO therapy as well as uncertainty of outcomes.   Questions answered, support provided.  Discussed with ECMO team, and nursing staff

## 2023-09-26 NOTE — CHART NOTE - NSCHARTNOTEFT_GEN_A_CORE
: [place]    INDICATION: critical illness; hypoxemia    PROCEDURE:  [x] LIMITED ECHO  [x] LIMITED CHEST  [ ] LIMITED RETROPERITONEAL  [ ] LIMITED ABDOMINAL  [ ] LIMITED DVT  [ ] NEEDLE GUIDANCE VASCULAR  [ ] NEEDLE GUIDANCE THORACENTESIS  [ ] NEEDLE GUIDANCE PARACENTESIS  [ ] NEEDLE GUIDANCE PERICARDIOCENTESIS  [ ] OTHER    FINDINGS: a-line pattern anteriorly; small bibasilar consolidation pattern; IVC 1.1 cm; LV appropriate size and systolic function, RV appropriate size/relative dimensions grossly; LA/RA nondilated.      INTERPRETATION: normal lung aeration pattern with bibasilar small consolidations likely representing atelectasis; grossly normal systolic function with likely fluid-responsive state. : [place]    INDICATION: critical illness; hypoxemia    PROCEDURE:  [x] LIMITED ECHO  [x] LIMITED CHEST  [ ] LIMITED RETROPERITONEAL  [ ] LIMITED ABDOMINAL  [ ] LIMITED DVT  [ ] NEEDLE GUIDANCE VASCULAR  [ ] NEEDLE GUIDANCE THORACENTESIS  [ ] NEEDLE GUIDANCE PARACENTESIS  [ ] NEEDLE GUIDANCE PERICARDIOCENTESIS  [ ] OTHER    FINDINGS: a-line pattern anteriorly; small bibasilar consolidation pattern; IVC 1.1 cm; LV appropriate size and systolic function, RV appropriate size/relative dimensions grossly; LA/RA nondilated.      INTERPRETATION: normal lung aeration pattern with bibasilar small consolidations likely representing atelectasis; grossly normal systolic function with likely fluid-responsive state.    Attending Attestation:  I was present during the key portions of the procedure and immediately available during the entire procedure.  Jozef Borden MD  Attending  Pulmonary & Critical Care Medicine

## 2023-09-26 NOTE — PROGRESS NOTE ADULT - PROBLEM SELECTOR PLAN 5
Pt is full code  Pt is tired and weak at this time, unable to have full on conversation  Discussed with partner Kiara that in time and when pt was more awake/conversant would recommend to address advanced directives.  For now will sign off, remain available for further GOC discussions as need arise, please reconsult as needed.

## 2023-09-26 NOTE — PROGRESS NOTE ADULT - SUBJECTIVE AND OBJECTIVE BOX
SUBJECTIVE AND OBJECTIVE: pt off ECMO, extubated on high flow, indicates that she feels weak.     Indication for Geriatrics and Palliative Care Services/INTERVAL HPI: complex decision making and support in the setting of vv ECMO    OVERNIGHT EVENTS: On high flow, tapering down as tolerated, has had episodes of hypotension and tachycardia.     DNR on chart: full code  Allergies    No Known Allergies    Intolerances    MEDICATIONS  (STANDING):  albuterol/ipratropium for Nebulization 3 milliLiter(s) Nebulizer every 6 hours  artificial  tears Solution 1 Drop(s) Both EYES every 12 hours  atovaquone  Suspension 1500 milliGRAM(s) Oral daily  chlorhexidine 2% Cloths 1 Application(s) Topical <User Schedule>  dextrose 5%. 1000 milliLiter(s) (50 mL/Hr) IV Continuous <Continuous>  dextrose 50% Injectable 12.5 Gram(s) IV Push once  dextrose 50% Injectable 25 Gram(s) IV Push once  dextrose 50% Injectable 25 Gram(s) IV Push once  enoxaparin Injectable 120 milliGRAM(s) SubCutaneous every 12 hours  folic acid 1 milliGRAM(s) Oral daily  glucagon  Injectable 1 milliGRAM(s) IntraMuscular once  insulin lispro (ADMELOG) corrective regimen sliding scale   SubCutaneous every 6 hours  insulin NPH human recombinant 5 Unit(s) SubCutaneous every 6 hours  methylPREDNISolone sodium succinate Injectable 80 milliGRAM(s) IV Push daily  metoprolol tartrate 25 milliGRAM(s) Oral two times a day  midodrine 20 milliGRAM(s) Oral every 8 hours  multivitamin/minerals/iron Oral Solution (CENTRUM) 15 milliLiter(s) Oral daily  pantoprazole  Injectable 40 milliGRAM(s) IV Push daily  senna Syrup 10 milliLiter(s) Oral at bedtime  sodium chloride 0.45%. 1000 milliLiter(s) (75 mL/Hr) IV Continuous <Continuous>    MEDICATIONS  (PRN):  dextrose Oral Gel 15 Gram(s) Oral once PRN Blood Glucose LESS THAN 70 milliGRAM(s)/deciliter    ITEMS UNCHECKED ARE NOT PRESENT    PRESENT SYMPTOMS: [x ]Unable to self-report - see [ ] CPOT [ ] PAINADS [ ] RDOS  Source if other than patient:  [ ]Family   [ ]Team     Pain:  [ ]yes [x ]no  QOL impact -   Location -                    Aggravating factors -  Quality -  Radiation -  Timing-  Severity (0-10 scale):  Minimal acceptable level (0-10 scale):     CPOT:    https://www.[email or web address]/getattachment/[email or web address]/Critical-Care-Pain-Observation-Tool-(CPOT)    PAIN AD Score:	  http://geriatrictoolkit.[facility]/cog/painad.pdf (Ctrl + left click to view)    Dyspnea:                           [ ]Mild [ ]Moderate [ ]Severe    RDOS:   0 to 2  minimal or no respiratory distress   3  mild distress  4 to 6 moderate distress  >7 severe distress  https://homecareinformation.net/handouts/hen/Respiratory_Distress_Observation_Scale.pdf (Ctrl +  left click to view)     Anxiety:                             [ ]Mild [ ]Moderate [ ]Severe  Fatigue:                             [ ]Mild [x ]Moderate [ ]Severe  Nausea:                             [ ]Mild [ ]Moderate [ ]Severe  Loss of appetite:              [ ]Mild [ ]Moderate [ ]Severe  Constipation:                    [ ]Mild [ ]Moderate [ ]Severe    PCSSQ[Palliative Care Spiritual Screening Question]   Severity (0-10): deferred  Score of 4 or > indicate consideration of Chaplaincy referral.    Chaplaincy Referral: [x ] yes [ ] refused [ ] following    Other Symptoms:  [x ]All other review of systems negative     Palliative Performance Status Version 2: 30-40 %      http://npcrc.org/files/news/palliative_performance_scale_ppsv2.pdf    PHYSICAL EXAM:  Vital Signs Last 24 Hrs  T(C): 37.1 (26 Sep 2023 08:00), Max: 37.1 (25 Sep 2023 16:00)  T(F): 98.8 (26 Sep 2023 08:00), Max: 98.8 (25 Sep 2023 16:00)  HR: 76 (26 Sep 2023 12:00) (72 - 97)  BP: --  BP(mean): --  RR: 27 (26 Sep 2023 12:00) (20 - 37)  SpO2: 100% (26 Sep 2023 12:00) (88% - 100%)    Parameters below as of 26 Sep 2023 12:00  Patient On (Oxygen Delivery Method): nasal cannula, high flow  O2 Flow (L/min): 40  O2 Concentration (%): 45 I&O's Summary    25 Sep 2023 07:01  -  26 Sep 2023 07:00  --------------------------------------------------------  IN: 1367.7 mL / OUT: 2000 mL / NET: -632.3 mL    26 Sep 2023 07:01  -  26 Sep 2023 14:19  --------------------------------------------------------  IN: 420 mL / OUT: 0 mL / NET: 420 mL    GENERAL: [ ]Cachexia    [x ]Alert  [ ]Oriented x   [ ]Lethargic  [ ]Unarousable  [ ]Verbal  [ ]Non-Verbal  Behavioral:   [ ]Anxiety  [x ]Delirium [ ]Agitation [ ]Other  HEENT: on high flow   [ ]Normal   [x ]Dry mouth   [ ]ET Tube/Trach  [ ]Oral lesions  PULMONARY:   [ ]Clear [ ]Tachypnea  [ ]Audible excessive secretions   [x ]Rhonchi        [ ]Right [ ]Left [ ]Bilateral  [ ]Crackles        [ ]Right [ ]Left [ ]Bilateral  [ ]Wheezing     [ ]Right [ ]Left [ ]Bilateral  [ ]Diminished BS [ ] Right [ ]Left [ ]Bilateral  CARDIOVASCULAR:    [ ]Regular [x ]Irregular [ ]Tachy  [ ]Everett [ ]Murmur [ ]Other  GASTROINTESTINAL:  [ x]Soft  [ x]Distended   [x ]+BS  [x ]Non tender [ ]Tender  [ ]Other [ ]PEG [ ]OGT/ NGT   Last BM:  9/25  GENITOURINARY:  [ ]Normal [x ]Incontinent: primafit   [ ]Oliguria/Anuria   [ ]Briseno  MUSCULOSKELETAL:   [ ]Normal   [ x]Weakness  [ ]Bed/Wheelchair bound [ ]Edema  NEUROLOGIC:   [ ]No focal deficits  [ ] Cognitive impairment  [ ] Dysphagia [ ]Dysarthria [ ] Paresis [ ]Other   SKIN:   [x ]Normal  [ ]Rash  [ ]Other  [ ]Pressure ulcer(s) [ ]y [ ]n present on admission    CRITICAL CARE:  [ ]Shock Present  [ ]Septic [ ]Cardiogenic [ ]Neurologic [ ]Hypovolemic  [ ]Vasopressors [ ]Inotropes  [ ]Respiratory failure present [ ]Mechanical Ventilation [ ]Non-invasive ventilatory support [ x]High-Flow   [ ]Acute  [ ]Chronic [ ]Hypoxic  [ ]Hypercarbic [ ]Other  [ ]Other organ failure     LABS:                        10.4   18.84 )-----------( 172      ( 26 Sep 2023 03:55 )             32.5     09-26    145  |  106  |  34<H>  ----------------------------<  162<H>  4.2   |  29  |  0.49<L>    Ca    9.7      26 Sep 2023 03:55  Phos  2.0     09-26  Mg     2.30     09-26    TPro  4.8<L>  /  Alb  3.0<L>  /  TBili  14.3<H>  /  DBili  x   /  AST  223<H>  /  ALT  243<H>  /  AlkPhos  678<H>  09-26    PT/INR - ( 26 Sep 2023 03:55 )   PT: 15.1 sec;   INR: 1.36 ratio      PTT - ( 26 Sep 2023 03:55 )  PTT:45.9 sec  Urinalysis Basic - ( 26 Sep 2023 03:55 )    Color: x / Appearance: x / SG: x / pH: x  Gluc: 162 mg/dL / Ketone: x  / Bili: x / Urobili: x   Blood: x / Protein: x / Nitrite: x   Leuk Esterase: x / RBC: x / WBC x   Sq Epi: x / Non Sq Epi: x / Bacteria: x    RADIOLOGY & ADDITIONAL STUDIES:  < from: Xray Chest 1 View- PORTABLE-Urgent (Xray Chest 1 View- PORTABLE-Urgent .) (09.24.23 @ 13:52) >  IMPRESSION:  Pulmonary venous congestion and small bilateral pleural effusions,   overall similar to that of the prior chest radiograph.    Protein Calorie Malnutrition Present: [ ]mild [ ]moderate [ ]severe [ ]underweight [ ]morbid obesity  https://www.andeal.org/vault/2440/web/files/ONC/Table_Clinical%20Characteristics%20to%20Document%20Malnutrition-White%20JV%20et%20al%202012.pdf    Height (cm): 172.7 (09-13-23 @ 15:32), 172.7 (09-13-23 @ 08:43)  Weight (kg): 127.6 (09-13-23 @ 17:00), 131.1 (09-13-23 @ 08:43)  BMI (kg/m2): 42.8 (09-13-23 @ 17:00), 44 (09-13-23 @ 15:32), 44 (09-13-23 @ 08:43)    [ ]PPSV2 < or = 30%  [ ]significant weight loss [ ]poor nutritional intake [ ]anasarca[ ]Artificial Nutrition    Other REFERRALS:  [ ]Hospice  [ ]Child Life  [ ]Social Work  [ ]Case management [ ]Holistic Therapy

## 2023-09-26 NOTE — DIETITIAN NUTRITION RISK NOTIFICATION - ADDITIONAL COMMENTS/DIETITIAN RECOMMENDATIONS
Please refer to Initial Dietitian Evaluation in documents section for nutritional recommendations. 
Nutrition follow up completed. Please refer to chart note via documents section in EMR for further recommendations.  Last updated: 9/26/2023

## 2023-09-26 NOTE — SWALLOW VFSS/MBS ASSESSMENT ADULT - COMMENTS
Per discussion with ECMO team, patient not medically optimized at this time for Cinesophagram. Team to reconsult this service as needed.

## 2023-09-26 NOTE — PROGRESS NOTE ADULT - PROBLEM SELECTOR PROBLEM 1
Personal history of extracorporeal membrane oxygenation (ECMO)
Personal history of extracorporeal membrane oxygenation (ECMO)

## 2023-09-26 NOTE — CHART NOTE - NSCHARTNOTEFT_GEN_A_CORE
NUTRITION FOLLOW-UP          61yo F transferred to [facility] with ARDS, intubated/sedated & cannulated (9/13) and initiated on VV-ECMO.  Recent findings c/w MCTD-ILD.  Pt. is now s/p decannulation & extubation (9/22) to HFNC.   Currently with acute hypoxemic respiratory failure, encephalopathy, RIJ DVT, transaminitis/hyperbilirubinemia, immunosuppressed, SVT, oropharyngeal dysphagia & shock.    Bedside swallow evaluation (9/25) where SLP deemed oral nutrition/hydration is contraindicated due to suspected severity of pharyngeal dysphagia.  MBS/cinesophagram deferred (9/26) as Pt. not medically optimized at this time.      Spoke with RN.  Met with Pt. and partner/significant other @ bedside.  NP present at time of nutrition encounter.    Observed Pt. on HFNC & enteral nutrition with Glucerna 1.5 @ prescribed goal of 45mL/hr via NGT.  Has maintained at prescribed goal.  Pt. without any noted/reported intolerance to TF @ this time (no nausea/vomiting/constipation or abdominal distention).  Rectal tube in place with ~200mL output in 24hrs.  Bowel regimen in place.     Explained purpose of NPO with tube feeding recommendation so as to reduce risk of possible aspiration.  When asked, Pt. does reply she feels hungry.    Although, Pt. now with significant 8.13% weight loss x 2 weeks.  Perhaps somewhat fluid related considering prior VV-ECMO & occasional diuresis.  Although fluid accumulation possibly masking further weight changes.  Overtly visible physical findings of mild muscle wasting and subcutaneous fat loss: temples, shoulders and orbitals.    Deemed severely acutely malnourished.    Advise increasing Glucerna 1.5 to goal of 55mL/hr and continuation of additional protein supplementation to more adequately meet re-estimated energy.  Discussed recommendation with NP and physician.            ________________Diet___________________  Diet, NPO with Tube Feed:   Tube Feeding Modality: Orogastric  Glucerna 1.5 Marvin (GLUCERNA1.5RTH)  Total Volume for 24 Hours (mL): 1080  Continuous  Starting Tube Feed Rate {mL per Hour}: 45  Until Goal Tube Feed Rate (mL per Hour): 45  Tube Feed Duration (in Hours): 24  Tube Feed Start Time: 14:00  No Carb Prosource (1pkg = 15gms Protein)     Qty per Day:  3 (09-24-23 @ 13:47) [Active]    provides:  1080mL total volume  1620 kcals  89g protein (+ 45g additional protein via NoCarb Prosource)= 134g total proteini  820mL free water       Weight:                    Height:  68"              Upper 10% Ideal Body Weight: 154lbs / 69.8kg  117.5kg (9/26)          Current BMI = 39.3 kg/m^2  131.7kg (9/21)  133.9kg (9/20)  131.6kg (9/17)  127.6kg (9/13 on admit)        _____Re-Estimated Energy Needs (based on upper 10% Ideal Body Weight)_____  25-30 kcals/kg = 4114-1437 KCals/d  1.8-2.0g protein/kg = 126-140g protein/d      Edema:  2+ generalized  Skin:  No pressure injuries        ________________________Pertinent Medications____________   MEDICATIONS  (STANDING):  albuterol/ipratropium for Nebulization 3 milliLiter(s) Nebulizer every 6 hours  artificial  tears Solution 1 Drop(s) Both EYES every 12 hours  atovaquone  Suspension 1500 milliGRAM(s) Oral daily  chlorhexidine 2% Cloths 1 Application(s) Topical <User Schedule>  dextrose 5%. 1000 milliLiter(s) (50 mL/Hr) IV Continuous <Continuous>  dextrose 50% Injectable 25 Gram(s) IV Push once  dextrose 50% Injectable 12.5 Gram(s) IV Push once  dextrose 50% Injectable 25 Gram(s) IV Push once  enoxaparin Injectable 120 milliGRAM(s) SubCutaneous every 12 hours  folic acid 1 milliGRAM(s) Oral daily  glucagon  Injectable 1 milliGRAM(s) IntraMuscular once  insulin lispro (ADMELOG) corrective regimen sliding scale   SubCutaneous every 6 hours  insulin NPH human recombinant 5 Unit(s) SubCutaneous every 6 hours  methylPREDNISolone sodium succinate Injectable 80 milliGRAM(s) IV Push daily  metoprolol tartrate 25 milliGRAM(s) Oral two times a day  midodrine 20 milliGRAM(s) Oral every 8 hours  multivitamin/minerals/iron Oral Solution (CENTRUM) 15 milliLiter(s) Oral daily  pantoprazole  Injectable 40 milliGRAM(s) IV Push daily  senna Syrup 10 milliLiter(s) Oral at bedtime  sodium chloride 0.45%. 1000 milliLiter(s) (75 mL/Hr) IV Continuous <Continuous>  sodium phosphate 30 milliMole(s)/500 mL IVPB 30 milliMole(s) IV Intermittent once    MEDICATIONS  (PRN):  dextrose Oral Gel 15 Gram(s) Oral once PRN Blood Glucose LESS THAN 70 milliGRAM(s)/deciliter          _________________________Pertinent Labs____________________     09-26    145  |  106  |  34<H>  ----------------------------<  162<H>  4.2   |  29  |  0.49<L>    Ca    9.7      26 Sep 2023 03:55  Phos  2.0     09-26  Mg     2.30     09-26    TPro  4.8<L>  /  Alb  3.0<L>  /  TBili  14.3<H>  /  DBili  x   /  AST  223<H>  /  ALT  243<H>  /  AlkPhos  678<H>  09-26                                                                   10.4   18.84 )-----------( 172      ( 26 Sep 2023 03:55 )             32.5         CAPILLARY BLOOD GLUCOSE      POCT Blood Glucose.: 138 mg/dL (26 Sep 2023 05:35)    POCT Blood Glucose.: 138 mg/dL (09-26-23 @ 05:35)  POCT Blood Glucose.: 134 mg/dL (09-26-23 @ 00:21)  POCT Blood Glucose.: 143 mg/dL (09-25-23 @ 16:53)  POCT Blood Glucose.: 254 mg/dL (09-25-23 @ 11:24)        ____________________New Nutrition Dx___________________________  Pt. meets criteria for severe malnutrition in the context of acute [critical] illness related to pulmonary dysfunction / ILD as evidenced by significant >8% weight loss x 2 weeks, 2+ edema, & findings of [mild] muscle wasting/fat loss.      PLAN/RECOMMENDATIONS:    1)  Increase Glucerna 1.5 to goal of 55mL/hr x 24hrs + 2 packet NoCarb Prosource per day    provides:  1320mL total volume  1980 kcals  109g protein (+ 30g additional protein via NoCarb Prosource)= 139g total protein  1002mL free water    2) Obtain daily weights  3) Monitor tolerance to TF, GI status, electrolytes, glucose       RDN remains available and will f/u PRN.          Marjorie Kidd, JEFFREY, CDN       pager [phone] or MS Teams

## 2023-09-26 NOTE — PROGRESS NOTE ADULT - ASSESSMENT
62 year old female with a past medical history of afib, and sciatica, who presented to [facility] for shortness of breath. Transferred from [facility] for vvECMO. Palliative Care consulted for complex decision making in the setting of serious illness.

## 2023-09-26 NOTE — PROGRESS NOTE ADULT - ASSESSMENT
63 yo F w/ Afib initially presented to urgent care for SOB where she had an XR done concerning for b/l lower infiltrates, sent to [facility] ED and admitted to  on 8/26 after being found to be hypoxemic, reported having  debilitating b/l hand numbness/tingling and some muscles weakness several months. She was found to have interstitial lung disease appearance on CT, with the plan for further ILD workup and management, started on prednisone on admission with gradually improving O2 requirements and stable on 2-3LNC. She underwent bronchoscopy with TBBX on 8/30 which showed Non-Specific Intersitial Disease (NSIP)/OP. Labs notable for positive ARIANA 1:1280, RNP > 8, Alejandro > 8. Patient continued on steroids and received cellcept, which was discontinued 2/2 Afib-RVR. Interval CTA chest on 9/11 also negative PE did show possible lingula pneumonia. Started on empiric vancomycin and zosyn 9/12, course was then c/b episode of SVT to 170s w/ rapidly progressive hypoxemic respiratory failure, placed NRB offered HFNC/BiPAP but refused, leading to worsening hypoxemic respiratory failure requiring intubation on 9/13. Despite best practices, patient remained hypoxemic and patient was cannulated for VV-ECMO on 9/13 and transferred to [facility] for further management. 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 Showed significant lung improvement and was decannulated 9/21. Extubated 9/22.     NEUROLOGY  Home meds: gabapentin 100g TID  AA&Ox3 at baseline   - following commands w/ delirium likely iso prolonged hospitalization, sedation, and ICU setting  - requiring precedex  for anxiety, stopped 9/22  -started seroquel 25 mg qhs but dose may be too much given hypotension and lethargy today, will decrease dose to 12.5mg   - CTH 9/14 no acute findings  - Palliative following  - Social Work consult  - PT/OT following will plan on kandi lift to chair today     PULMONARY  Admitted to [facility] on 8/26 after p/w SOB, found to be hypoxemic, required 2-4L NC/bibap, initially responded well to IV steroids. 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 2/2 bacterial PNA vs atypical PNA vs aspiration vs AEILD continued to show significant improvement from a lung perspective and was decannulated 9/21, and extubated to HFNC 9/22.  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   - 9/20 BAL culture: normal respiratory selvin  - continue duonebs  - s/p empiric abx   - s/p decannulation on 9/21 with plan for suture removal ~7-10 days   - extubated 9/22 to HFNC   - weaned from HFNC to 6L nasal cannula today will continue to titrate down O2     CARDIOVASCULAR  Hx of Afib w at OSH, started on Amiodarone gtt now in shock state requiring requiring pressors likely septic with component of vaspoplegia i/s/o sedation, possible contribution of cardiogenic from RV dysfunction. Now having episodes of SVT responsive to lopressor   - No PE seen on invasive pulmonary angiography at time of ECMO cannulation or in recent imaging  - NO2 weaned off  (9/15) RV function remains unchanged  - s/p milrinone, off since (9/13)  - converted to sinus (9/14) discontinued amio gtt iso bradycardia, back to AF with RVR on 9/19 when sedation weaned off  -currently on metoprolol 12.5mg BID for rate control will increase to 25mg BID today   - BP labile, has received hydralazine IVP for hypertensive episodes w/ anxiety likely a contributing factor, however is also requiring low dose Phenylephrine  for intermittent episodes of hypotension that occurs mostly while asleep.  -restarted phenylephrine overnight will increase midodrine 10mg TID to 20mg TID    - RITCHIE 9/14 : No MEREDITH thrombus noted, negative for PFO. LVOT diameter of 2 cm  - TTE 9/12 with EF 65-70% with normal LV and RV  - TTE 9/14 with  EF 18%. Severe global LV systolic dysfunction. RV enlargement with decreased RV systolic function, however RITCHIE and recent POCUS shows normal LV systolic function, improved RV systolic function   - TTE 9/19 with recovered EF to 67% but still with RV enlargement and systolic dysfunction    GASTROINTESTINAL  Transaminase elevation likely 2/2 combination of shock liver, malposition of ECMO cannula and liver dysfunction  - ALK/ AST/ALT downtrending but remain elevated 490/136/225, T.bili continues to rise 16.3 today  - Acute hepatitis panel negative  - RUQ US 9/15 shows fatty infiltration of liver, negative for hepatobiliary pathology, repeat RUQ US performed 9/22 for worsening LFT's and rising bili with findings negative as well  - Hepatology consulted - likely shock liver with expected delay in improvement in bilirubin  - Hypertriglyceridemia 836, hx of fatty liver and propofol gtt, s/p insulin gtt, now improving  - Last BM noted 9/24  - continue bowel regimen senna and miralax  - CT abdomen 9/15 w/o bowel obstruction, noted with colonic diverticulosis mostly in sigmoid, w/o evidence of diverticulitis  - continue PPI iso steroids   - nutrition following  - failed dysphagia screen x2 currently with KFT in place tolerating tube feeds   -plan for cineesophagogram     RENAL  sCr baseline 0.78  - Creatinine wnl  - s/p diuresis with lasix, last administered 9/15, fluid removal via hemo concentrator while on ECMO circuit, will assess daily if lasix is needed   - s/p Bumex 1mg IVP given to optimize post extubation   - good UO with primafit  - hypernatremia improved, free H2O discontinued 9/21  -IVC small 9/24 with BP 80-90's will give albumin 250cc x2       INFECTIOUS DISEASE  Leukocytosis  - Elevated WBC likely iso filgrastim (initiated 9/17 for leukopenia) as well as s/p ECMO decannulation, however would send cultures if continues to uptrend or febrile  - monitoring off antibiotics for now  - leukopenia now resolved s/p filgrastim (9/17-9/21)  - Bactrim DS discontinued iso leukopenia, continue Mepron for PJP prophylaxis instead  - s/p IV Ceftriaxone 5 days? at [facility] out of an abundance of precaution to cover BAL specimen  - s/p zosyn at OSH (9/12) for lingula PNA  - vancomycin (9/12-9/15 ) d/c'd negative mrsa PCR, and azithro (9/14-9/15) d/c'd neg Urine legionella  - s/p empiric donald (9/13-9/20) now that ANC >1.5  - vanco restarted for ppx on 9/18 d/t leukopenia but stopped on 9/20  - 9/13 and 9/16 urine, sputum and blood cx ngtd  - positive COVID-19 antigen in late August  - f/u BAL, cultures, cytopathology --- so far negative   - ID following- f/u recs    ENDOCRINE  # Hyperglycemia i/s/o steroids  Admission A1c 6.1  - had required insulin gtt currently on NPH 11 with sliding scale will decrease NPH to 5u units and watch glucose closely   - elevated triglycerides 835, has hx of fatty liver, had been on propofol gtt. TG downtrending since initiating insulin gtt for hyperglycemia, now normalizing off propofol  - TSH low initially at 0.13 repeat normal     HEME  #Right IJ thrombus   -s/p argatroban gtt will transition to lovenox today    - INR elevated likely iso argatroban and liver dysfxn, s/p Vit. K (9/14) and FFP x1 (9/15) - now resolved  - platelets x 7 for thrombocytopenia <50,000, last transfused  9/21 continues to improve  - fibrinogen 225,   - Hgb remains stable   - Heme following- f/u recs    #Leukopenia  #Thrombocytopenia  - Heme consult placed for thrombocytopenia, noted to also be leukopenic but now resolved   - unlikely HLH/MAS since many abnormalities can be explained by severe hypoxemia/hypotension during initial decompensation prior to ecmo cannulation but some features that are consistent and with rheumatic disease it is a possibility to f/u hematology regarding utility of further lab/pathologic testing  - peripheral blood smear reviewed: large platelets noted, no platelet clumps, no blast cells noted, neutrophils noted w/ normal number of lobes, nucleated RBC noted  - acute hepatitis panel negative  - s/p filgrastim 480 daily  given ANC >1500 in the setting of possible infxn   - Thrombocytopenia refractory after intermittent transfusions will monitor now off circuit, platelet count continues to improve    #R/O DVT  - LE duplex negative for DVT   - first VA duplex post decannulation 9/21 shows non-occlusive deep vein thrombosis in the right jugular vein and the right cephalic is non-compressible      MSK  Onset of debilitating b/l hand cramps and some muscles weakness x several months, unclear etiology, radiculopathy? vs myositis?   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   - symptoms improved with cellcept (9/8-9/11) but discontinued given Afib RVR   - consider restarting home Gabapentin to assist with neuropathic pain? once more alert and awake  - f/u with Dr. Nik Marie or Dante Urbano for EMG upon discharge (osh?)      RHEUM  - started on Solumedrol 60mg q6h from 9/1 to 9/6 then weaned over time to then Medrol 32 mg 9/9 to 9/12 at H  - s/p Cellcept 9/8 to 9/11 but stopped due to Afib- RVR/tachycardia   - CT findings, Improved/decreased extent of bilateral ground glass opacities and reticular markings compared to the previous study. Findings are nonspecific but differential etiologies include interstitial pneumonia, drug toxicity, nonspecific connective tissue disorders.  - OSH labs show strongly positive ARIANA, RNP, and anti smith antibodies with low C4 and normal C3. Patient with negative SSa and SSb, negative DsDNa, myomarker panel negative (except RNP)  - IL2 receptor elevated 3119.2  - Rheum following  - s/p 1g solumedrol - first dose on (9/13) second dose (9/14) the third and last dose  (9/15) and then solumedrol (1mg/kg) 125mg daily, lowered to 80mg daily (9/19) as per rheum, will consider taper this week  - s/p plasmapheresis x3  (9/15), (9/18), (9/20) for therapeutic option to rapidly remove autoantibodies and inflammatory factors from plasma d/t severe DAH  - s/p rituximab 9/16/23  - will plan for next rituximab dose ~9/30/23       SKIN  Reportedly had single episode of painful rash on her shins, ears and neck and chest which resolved with a steroid cream from a dermatologist prior to admission  - no evidence of rash currently  - right groin with breakdown s/p cannula placement        PROPHYLAXIS  # DVT: argatroban  # GI: TF      LINES  -Ecmo Cannulas 9/13-9/21  -LIJ TLC- 9/13-9/21  -Left Ax Traci - 9/13 (placed at OSH)  -RA 16g PIV x2- 9/15      GOC  -Palliative Following  -full code  -partner Kiara at bedside daily and updated on care    63 yo F w/ Afib initially presented to urgent care for SOB where she had an XR done concerning for b/l lower infiltrates, sent to [facility] ED and admitted to  on 8/26 after being found to be hypoxemic, reported having  debilitating b/l hand numbness/tingling and some muscles weakness several months. She was found to have interstitial lung disease appearance on CT, with the plan for further ILD workup and management, started on prednisone on admission with gradually improving O2 requirements and stable on 2-3LNC. She underwent bronchoscopy with TBBX on 8/30 which showed Non-Specific Intersitial Disease (NSIP)/OP. Labs notable for positive ARIANA 1:1280, RNP > 8, Alejandro > 8. Patient continued on steroids and received cellcept, which was discontinued 2/2 Afib-RVR. Interval CTA chest on 9/11 also negative PE did show possible lingula pneumonia. Started on empiric vancomycin and zosyn 9/12, course was then c/b episode of SVT to 170s w/ rapidly progressive hypoxemic respiratory failure, placed NRB offered HFNC/BiPAP but refused, leading to worsening hypoxemic respiratory failure requiring intubation on 9/13. Despite best practices, patient remained hypoxemic and patient was cannulated for VV-ECMO on 9/13 and transferred to [facility] for further management. 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 Showed significant lung improvement and was decannulated 9/21. Extubated 9/22.     NEUROLOGY  Home meds: gabapentin 100g TID  AA&Ox3 at baseline   - following commands w/ delirium likely iso prolonged hospitalization, sedation, and ICU setting  - required precedex  for anxiety, stopped 9/22  - started seroquel 25 mg qhs but dose may be too much given hypotension and lethargy, will decrease dose to 12.5mg   - CTH 9/14 no acute findings  - Palliative following  - Social Work consult  - PT/OT following will plan on kandi lift to chair today     PULMONARY  Admitted to [facility] on 8/26 after p/w SOB, found to be hypoxemic, required 2-4L NC/bibap, initially responded well to IV steroids. 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 2/2 bacterial PNA vs atypical PNA vs aspiration vs AEILD continued to show significant improvement from a lung perspective and was decannulated 9/21, and extubated to HFNC 9/22.  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   - 9/20 BAL culture: normal respiratory selvin  - continue duonebs  - s/p empiric abx   - s/p decannulation on 9/21 with plan for suture removal ~7-10 days   - extubated 9/22 to HFNC   - weaned from HFNC to 6L nasal cannula will continue to titrate down O2     CARDIOVASCULAR  Hx of Afib w at OSH, started on Amiodarone gtt now in shock state requiring requiring pressors likely septic with component of vaspoplegia i/s/o sedation, possible contribution of cardiogenic from RV dysfunction. Now having episodes of SVT responsive to lopressor   - No PE seen on invasive pulmonary angiography at time of ECMO cannulation or in recent imaging  - NO2 weaned off  (9/15) RV function remains unchanged  - s/p milrinone, off since (9/13)  - converted to sinus (9/14) discontinued amio gtt iso bradycardia, back to AF with RVR on 9/19 when sedation weaned off  -currently on metoprolol 12.5mg BID for rate control will increase to 25mg BID today   - BP labile, has received hydralazine IVP for hypertensive episodes w/ anxiety likely a contributing factor, however is also requiring low dose Phenylephrine  for intermittent episodes of hypotension that occurs mostly while asleep.  -restarted phenylephrine overnight will increase midodrine 10mg TID to 20mg TID    - RITCHIE 9/14 : No MEREDITH thrombus noted, negative for PFO. LVOT diameter of 2 cm  - TTE 9/12 with EF 65-70% with normal LV and RV  - TTE 9/14 with  EF 18%. Severe global LV systolic dysfunction. RV enlargement with decreased RV systolic function, however RITCHIE and recent POCUS shows normal LV systolic function, improved RV systolic function   - TTE 9/19 with recovered EF to 67% but still with RV enlargement and systolic dysfunction    GASTROINTESTINAL  Transaminase elevation likely 2/2 combination of shock liver, malposition of ECMO cannula and liver dysfunction  - ALK/ AST/ALT downtrending but remain elevated 490/136/225, T.bili continues to rise 16.3 today  - Acute hepatitis panel negative  - RUQ US 9/15 shows fatty infiltration of liver, negative for hepatobiliary pathology, repeat RUQ US performed 9/22 for worsening LFT's and rising bili with findings negative as well  - Hepatology consulted - likely shock liver with expected delay in improvement in bilirubin  - Hypertriglyceridemia 836, hx of fatty liver and propofol gtt, s/p insulin gtt, now improving  - Last BM noted 9/24  - continue bowel regimen senna and miralax  - CT abdomen 9/15 w/o bowel obstruction, noted with colonic diverticulosis mostly in sigmoid, w/o evidence of diverticulitis  - continue PPI iso steroids   - nutrition following  - failed dysphagia screen x2 currently with KFT in place tolerating tube feeds   - plan for cineesophagogram     RENAL  sCr baseline 0.78  - Creatinine wnl  - s/p diuresis with lasix, last administered 9/15, fluid removal via hemo concentrator while on ECMO circuit, will assess daily if lasix is needed   - s/p Bumex 1mg IVP given to optimize post extubation   - good UO with primafit  - hypernatremia improved, free H2O discontinued 9/21  - IVC small 9/24 with BP 80-90's will give albumin 250cc x2       INFECTIOUS DISEASE  Leukocytosis  - Elevated WBC likely iso filgrastim (initiated 9/17 for leukopenia) as well as s/p ECMO decannulation, however would send cultures if continues to uptrend or febrile  - monitoring off antibiotics for now  - leukopenia now resolved s/p filgrastim (9/17-9/21)  - Bactrim DS discontinued iso leukopenia, continue Mepron for PJP prophylaxis instead  - s/p IV Ceftriaxone 5 days? at [facility] out of an abundance of precaution to cover BAL specimen  - s/p zosyn at OSH (9/12) for lingula PNA  - vancomycin (9/12-9/15 ) d/c'd negative mrsa PCR, and azithro (9/14-9/15) d/c'd neg Urine legionella  - s/p empiric donald (9/13-9/20) now that ANC >1.5  - vanco restarted for ppx on 9/18 d/t leukopenia but stopped on 9/20  - 9/13 and 9/16 urine, sputum and blood cx ngtd  - positive COVID-19 antigen in late August  - f/u BAL, cultures, cytopathology --- so far negative   - ID following- f/u recs    ENDOCRINE  # Hyperglycemia i/s/o steroids  Admission A1c 6.1  - had required insulin gtt currently on NPH 11 with sliding scale, now with NPH lowered to 5u units, will watch glucose closely   - elevated triglycerides 835, has hx of fatty liver, had been on propofol gtt. TG downtrending since initiating insulin gtt for hyperglycemia, now normalizing off propofol  - TSH low initially at 0.13 repeat normal     HEME  #Right IJ thrombus   -s/p argatroban gtt will transition to lovenox today    - INR elevated likely iso argatroban and liver dysfxn, s/p Vit. K (9/14) and FFP x1 (9/15) - now resolved  - platelets x 7 for thrombocytopenia <50,000, last transfused  9/21 continues to improve  - Hgb remains stable   - Heme following- f/u recs    #Leukopenia  #Thrombocytopenia  - Heme consult placed for thrombocytopenia, noted to also be leukopenic but now resolved   - unlikely HLH/MAS since many abnormalities can be explained by severe hypoxemia/hypotension during initial decompensation prior to ecmo cannulation but some features that are consistent and with rheumatic disease it is a possibility to f/u hematology regarding utility of further lab/pathologic testing  - peripheral blood smear reviewed: large platelets noted, no platelet clumps, no blast cells noted, neutrophils noted w/ normal number of lobes, nucleated RBC noted  - acute hepatitis panel negative  - s/p filgrastim 480 daily  given ANC >1500 in the setting of possible infxn   - Thrombocytopenia refractory after intermittent transfusions will monitor now off circuit, platelet count continues to improve    #R/O DVT  - LE duplex negative for DVT   - first VA duplex post decannulation 9/21 shows non-occlusive deep vein thrombosis in the right jugular vein and the right cephalic is non-compressible      MSK  Onset of debilitating b/l hand cramps and some muscles weakness x several months, unclear etiology, radiculopathy? vs myositis?   - MRI outpatient reportedly showed cervical spine issues, started on Prednisone shortly before admission at OSH  - myomarker panel + for RNP and Ku  - seen by neurologist at [facility]   - symptoms improved with cellcept (9/8-9/11) but discontinued given Afib RVR   - consider restarting home Gabapentin to assist with neuropathic pain? once more alert and awake  - f/u with Dr. Nik Marie or Dante Urbano for EMG upon discharge (osh?)      RHEUM  - started on Solumedrol 60mg q6h from 9/1 to 9/6 then weaned over time to then Medrol 32 mg 9/9 to 9/12 at [facility]  - s/p Cellcept 9/8 to 9/11 but stopped due to Afib- RVR/tachycardia   - CT findings, Improved/decreased extent of bilateral ground glass opacities and reticular markings compared to the previous study. Findings are nonspecific but differential etiologies include interstitial pneumonia, drug toxicity, nonspecific connective tissue disorders.  - OSH labs show strongly positive ARIANA, RNP, and anti smith antibodies with low C4 and normal C3. Patient with negative SSa and SSb, negative DsDNa, myomarker panel negative (except RNP)  - IL2 receptor elevated 3119.2  - Rheum following  - s/p 1g solumedrol - first dose on (9/13) second dose (9/14) the third and last dose  (9/15) and then solumedrol (1mg/kg) 125mg daily, lowered to 80mg daily (9/19) as per rheum, will consider taper this week  - s/p plasmapheresis x3  (9/15), (9/18), (9/20) for therapeutic option to rapidly remove autoantibodies and inflammatory factors from plasma d/t severe DAH  - s/p rituximab 9/16/23  - will plan for next rituximab dose ~9/30/23       SKIN  Reportedly had single episode of painful rash on her shins, ears and neck and chest which resolved with a steroid cream from a dermatologist prior to admission  - no evidence of rash currently  - right groin with breakdown s/p cannula placement        PROPHYLAXIS  # DVT: argatroban  # GI: TF      LINES  -Ecmo Cannulas 9/13-9/21  -LIJ TLC- 9/13-9/21  -Left Ax Traci - 9/13 (placed at OSH)  -RA 16g PIV x2- 9/15      GOC  -Palliative Following  -full code  -partner Kiara at bedside daily and updated on care    63 yo F w/ Afib initially presented to urgent care for SOB where she had an XR done concerning for b/l lower infiltrates, sent to [facility] ED and admitted to  on 8/26 after being found to be hypoxemic, reported having  debilitating b/l hand numbness/tingling and some muscles weakness several months. She was found to have interstitial lung disease appearance on CT, with the plan for further ILD workup and management, started on prednisone on admission with gradually improving O2 requirements and stable on 2-3LNC. She underwent bronchoscopy with TBBX on 8/30 which showed Non-Specific Intersitial Disease (NSIP)/OP. Labs notable for positive ARIANA 1:1280, RNP > 8, Alejandro > 8. Patient continued on steroids and received cellcept, which was discontinued 2/2 Afib-RVR. Interval CTA chest on 9/11 also negative PE did show possible lingula pneumonia. Started on empiric vancomycin and zosyn 9/12, course was then c/b episode of SVT to 170s w/ rapidly progressive hypoxemic respiratory failure, placed NRB offered HFNC/BiPAP but refused, leading to worsening hypoxemic respiratory failure requiring intubation on 9/13. Despite best practices, patient remained hypoxemic and patient was cannulated for VV-ECMO on 9/13 and transferred to [facility] for further management. 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 Showed significant lung improvement and was decannulated 9/21. Extubated 9/22.     NEUROLOGY  Home meds: gabapentin 100g TID  AA&Ox3 at baseline   - following commands w/ delirium likely iso prolonged hospitalization, sedation, and ICU setting  - required precedex for anxiety, stopped 9/22  - started seroquel 25 mg qhs but dose may be too much given hypotension and lethargy, will continue to hold for now, and if restarted decrease dose to 12.5mg   - CTH 9/14 no acute findings  - Palliative following  - Social Work consult  - PT/OT following will plan on kandi lift to chair today     PULMONARY  Admitted to [facility] on 8/26 after p/w SOB, found to be hypoxemic, required 2-4L NC/bibap, initially responded well to IV steroids. 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 2/2 bacterial PNA vs atypical PNA vs aspiration vs AEILD continued to show significant improvement from a lung perspective and was decannulated 9/21, and extubated to HFNC 9/22.  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   - 9/20 BAL culture: normal respiratory selvin  - continue duonebs  - s/p empiric abx   - s/p decannulation on 9/21 with plan for suture removal ~7-10 days   - extubated 9/22 to HFNC   - weaned from HFNC to 6L nasal cannula, overnight placed back on HFNC 50%/40L for increased WOB and po2 60's,  will continue to titrate down O2 as tolerated  - POCUS exam showed small b/l consolidation, and atelectasis. Will encourage use of incentive spirometer     CARDIOVASCULAR  Hx of Afib w at OSH, started on Amiodarone gtt now in shock state requiring requiring pressors likely septic with component of vaspoplegia i/s/o sedation, possible contribution of cardiogenic from RV dysfunction. Now having episodes of SVT responsive to lopressor   - No PE seen on invasive pulmonary angiography at time of ECMO cannulation or in recent imaging  - NO2 weaned off  (9/15) RV function remains unchanged  - s/p milrinone, off since (9/13)  - converted to sinus (9/14) discontinued amio gtt iso bradycardia, back to AF with RVR on 9/19 when sedation weaned off  - continue 25mg metoprolol BID for rate control   - BP labile, has received hydralazine IVP for hypertensive episodes w/ anxiety likely a contributing factor, however is also requiring low dose Phenylephrine  for intermittent episodes of hypotension that occurs mostly while asleep.  - phenylephrine off since (9/25), continue midodrine 20mg TID    - RITCHIE 9/14 : No MEREDITH thrombus noted, negative for PFO. LVOT diameter of 2 cm  - TTE 9/12 with EF 65-70% with normal LV and RV  - TTE 9/14 with  EF 18%. Severe global LV systolic dysfunction. RV enlargement with decreased RV systolic function, however RITCHIE and recent POCUS shows normal LV systolic function, improved RV systolic function   - TTE 9/19 with recovered EF to 67% but still with RV enlargement and systolic dysfunction    GASTROINTESTINAL  Transaminase elevation likely 2/2 combination of shock liver, malposition of ECMO cannula and liver dysfunction  - ALK/ AST/ALT downtrending but remain elevated 490/136/225, T.bili continues to rise 16.3 today  - Acute hepatitis panel negative  - RUQ US 9/15 shows fatty infiltration of liver, negative for hepatobiliary pathology, repeat RUQ US performed 9/22 for worsening LFT's and rising bili with findings negative as well  - Hepatology consulted - likely shock liver with expected delay in improvement in bilirubin  - Hypertriglyceridemia 836, hx of fatty liver and propofol gtt, s/p insulin gtt, now improving  - Last BM noted 9/24  - continue bowel regimen senna and miralax  - CT abdomen 9/15 w/o bowel obstruction, noted with colonic diverticulosis mostly in sigmoid, w/o evidence of diverticulitis  - continue PPI iso steroids   - nutrition following  - failed dysphagia screen x2 currently with KFT in place tolerating tube feeds  - will hold off cineesophagogram and reassess if ready for bedside FEESST in a few days     RENAL  sCr baseline 0.78  - Creatinine wnl  - s/p diuresis with lasix, last administered 9/15, fluid removal via hemo concentrator while on ECMO circuit, will assess daily if lasix is needed   - s/p Bumex 1mg IVP given to optimize post extubation   - good UO with primafit  - hypernatremia improved, free H2O discontinued 9/21  - IVC small 9/26, will start IVF 0.45% NS 75ml/hr x24 hours      INFECTIOUS DISEASE  Leukocytosis  - Elevated WBC likely iso filgrastim (initiated 9/17 for leukopenia) as well as s/p ECMO decannulation, bandemia present but have now downtrended,  however would send cultures if continues to uptrend or febrile  - monitoring off antibiotics for now  - leukopenia now resolved s/p filgrastim (9/17-9/21)  - Bactrim DS discontinued iso leukopenia, continue Mepron for PJP prophylaxis instead  - s/p IV Ceftriaxone 5 days? at [facility] out of an abundance of precaution to cover BAL specimen  - s/p zosyn at OSH (9/12) for lingula PNA  - vancomycin (9/12-9/15 ) d/c'd negative mrsa PCR, and azithro (9/14-9/15) d/c'd neg Urine legionella  - s/p empiric donald (9/13-9/20) now that ANC >1.5  - vanco restarted for ppx on 9/18 d/t leukopenia but stopped on 9/20  - 9/13 and 9/16 urine, sputum and blood cx ngtd  - positive COVID-19 antigen in late August  - f/u BAL, cultures, cytopathology --- so far negative   - ID following- f/u recs    ENDOCRINE  # Hyperglycemia i/s/o steroids  Admission A1c 6.1  - had required insulin gtt currently on NPH 11 with sliding scale, now with NPH lowered to 5u units, will watch glucose closely   - elevated triglycerides 835, has hx of fatty liver, had been on propofol gtt. TG downtrending since initiating insulin gtt for hyperglycemia, now normalizing off propofol  - TSH low initially at 0.13 repeat normal     HEME  #Right IJ thrombus   -s/p argatroban gtt will transition to lovenox today    - INR elevated likely iso argatroban and liver dysfxn, s/p Vit. K (9/14) and FFP x1 (9/15) - now resolved  - platelets x 7 for thrombocytopenia <50,000, last transfused  9/21 continues to improve  - Hgb remains stable   - Heme following- f/u recs    #Leukopenia  #Thrombocytopenia  - Heme consult placed for thrombocytopenia, noted to also be leukopenic but now resolved   - unlikely HLH/MAS since many abnormalities can be explained by severe hypoxemia/hypotension during initial decompensation prior to ecmo cannulation but some features that are consistent and with rheumatic disease it is a possibility to f/u hematology regarding utility of further lab/pathologic testing  - peripheral blood smear reviewed: large platelets noted, no platelet clumps, no blast cells noted, neutrophils noted w/ normal number of lobes, nucleated RBC noted  - acute hepatitis panel negative  - s/p filgrastim 480 daily  given ANC >1500 in the setting of possible infxn   - Thrombocytopenia refractory after intermittent transfusions will monitor now off circuit, platelet count continues to improve    #R/O DVT  - LE duplex negative for DVT   - first VA duplex post decannulation 9/21 shows non-occlusive deep vein thrombosis in the right jugular vein and the right cephalic is non-compressible      MSK  Onset of debilitating b/l hand cramps and some muscles weakness x several months, unclear etiology, radiculopathy? vs myositis?   - MRI outpatient reportedly showed cervical spine issues, started on Prednisone shortly before admission at OSH  - myomarker panel + for RNP and Ku  - seen by neurologist at [facility]   - symptoms improved with cellcept (9/8-9/11) but discontinued given Afib RVR   - consider restarting home Gabapentin to assist with neuropathic pain? once more alert and awake  - f/u with Dr. Nik Marie or Dante Urbano for EMG upon discharge (osh?)      RHEUM  - started on Solumedrol 60mg q6h from 9/1 to 9/6 then weaned over time to then Medrol 32 mg 9/9 to 9/12 at [facility]  - s/p Cellcept 9/8 to 9/11 but stopped due to Afib- RVR/tachycardia   - CT findings, Improved/decreased extent of bilateral ground glass opacities and reticular markings compared to the previous study. Findings are nonspecific but differential etiologies include interstitial pneumonia, drug toxicity, nonspecific connective tissue disorders.  - OSH labs show strongly positive ARIANA, RNP, and anti smith antibodies with low C4 and normal C3. 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  - s/p rituximab 9/16/23  - will plan for next rituximab dose ~9/30/23       SKIN  Reportedly had single episode of painful rash on her shins, ears and neck and chest which resolved with a steroid cream from a dermatologist prior to admission  - no evidence of rash currently  - right groin with breakdown s/p cannula placement        PROPHYLAXIS  # DVT: argatroban  # GI: TF      LINES  -Ecmo Cannulas 9/13-9/21  -LIJ TLC- 9/13-9/21  -Left Ax [place] - 9/13 (placed at OSH)  -RA 16g PIV x2- 9/15      GOC  -Palliative Following  -full code  -partner Kiara at bedside daily and updated on care    63 yo F w/ Afib initially presented to urgent care for SOB where she had an XR done concerning for b/l lower infiltrates, sent to [facility] ED and admitted to  on 8/26 after being found to be hypoxemic, reported having  debilitating b/l hand numbness/tingling and some muscles weakness several months. She was found to have interstitial lung disease appearance on CT, with the plan for further ILD workup and management, started on prednisone on admission with gradually improving O2 requirements and stable on 2-3LNC. She underwent bronchoscopy with TBBX on 8/30 which showed Non-Specific Intersitial Disease (NSIP)/OP. Labs notable for positive ARIANA 1:1280, RNP > 8, Alejandro > 8. Patient continued on steroids and received cellcept, which was discontinued 2/2 Afib-RVR. Interval CTA chest on 9/11 also negative PE did show possible lingula pneumonia. Started on empiric vancomycin and zosyn 9/12, course was then c/b episode of SVT to 170s w/ rapidly progressive hypoxemic respiratory failure, placed NRB offered HFNC/BiPAP but refused, leading to worsening hypoxemic respiratory failure requiring intubation on 9/13. Despite best practices, patient remained hypoxemic and patient was cannulated for VV-ECMO on 9/13 and transferred to [facility] for further management. 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 Showed significant lung improvement and was decannulated 9/21. Extubated 9/22.     NEUROLOGY  Home meds: gabapentin 100g TID  AA&Ox3 at baseline   - following commands w/ delirium likely iso prolonged hospitalization, sedation, and ICU setting  - required precedex for anxiety, stopped 9/22  - started seroquel 25 mg qhs but dose may be too much given hypotension and lethargy, continue to hold for now, and if restarted will decrease dose to 12.5mg   - CTH 9/14 no acute findings  - Palliative following  - Social Work consult  - PT/OT following will plan on kandi lift to chair today     PULMONARY  Admitted to [facility] on 8/26 after p/w SOB, found to be hypoxemic, required 2-4L NC/bibap, initially responded well to IV steroids. 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 2/2 bacterial PNA vs atypical PNA vs aspiration vs AEILD continued to show significant improvement from a lung perspective and was decannulated 9/21, and extubated to HFNC 9/22.  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   - 9/20 BAL culture: normal respiratory selvin  - continue duonebs  - s/p empiric abx   - s/p decannulation on 9/21 with plan for suture removal ~7-10 days   - extubated 9/22 to HFNC   - weaned from HFNC to 6L nasal cannula, overnight placed back on HFNC 50%/40L for increased WOB and po2 60's,  will continue to titrate down O2 as tolerated  - POCUS exam showed small b/l consolidation, and atelectasis. Will encourage use of incentive spirometer     CARDIOVASCULAR  Hx of Afib w at OSH, started on Amiodarone gtt now in shock state requiring requiring pressors likely septic with component of vaspoplegia i/s/o sedation, possible contribution of cardiogenic from RV dysfunction. Now having episodes of SVT responsive to lopressor   - No PE seen on invasive pulmonary angiography at time of ECMO cannulation or in recent imaging  - NO2 weaned off  (9/15) RV function remains unchanged  - s/p milrinone, off since (9/13)  - converted to sinus (9/14) discontinued amio gtt iso bradycardia, back to AF with RVR on 9/19 when sedation weaned off  - continue 25mg metoprolol BID for rate control   - BP labile, has received hydralazine IVP for hypertensive episodes w/ anxiety likely a contributing factor, however is also requiring low dose Phenylephrine  for intermittent episodes of hypotension that occurs mostly while asleep.  - phenylephrine off since (9/25), continue midodrine 20mg TID    - RITCHIE 9/14 : No MEREDITH thrombus noted, negative for PFO. LVOT diameter of 2 cm  - TTE 9/12 with EF 65-70% with normal LV and RV  - TTE 9/14 with  EF 18%. Severe global LV systolic dysfunction. RV enlargement with decreased RV systolic function, however RITCHIE and recent POCUS shows normal LV systolic function, improved RV systolic function   - TTE 9/19 with recovered EF to 67% but still with RV enlargement and systolic dysfunction    GASTROINTESTINAL  Transaminase elevation likely 2/2 combination of shock liver, malposition of ECMO cannula and liver dysfunction  - ALK/ AST/ALT downtrending but remain elevated 490/136/225, T.bili continues to rise 16.3 today  - Acute hepatitis panel negative  - RUQ US 9/15 shows fatty infiltration of liver, negative for hepatobiliary pathology, repeat RUQ US performed 9/22 for worsening LFT's and rising bili with findings negative as well  - Hepatology consulted - likely shock liver with expected delay in improvement in bilirubin  - Hypertriglyceridemia 836, hx of fatty liver and propofol gtt, s/p insulin gtt, now improving  - Last BM noted 9/24  - continue bowel regimen senna and miralax  - CT abdomen 9/15 w/o bowel obstruction, noted with colonic diverticulosis mostly in sigmoid, w/o evidence of diverticulitis  - continue PPI iso steroids   - nutrition following  - failed dysphagia screen x2 currently with KFT in place tolerating tube feeds  - will hold off cineesophagogram and reassess if ready for bedside FEESST in a few days     RENAL  sCr baseline 0.78  - Creatinine wnl  - s/p diuresis with lasix, last administered 9/15, fluid removal via hemo concentrator while on ECMO circuit, will assess daily if lasix is needed   - s/p Bumex 1mg IVP given to optimize post extubation   - good UO with primafit  - hypernatremia improved, free H2O discontinued 9/21  - IVC small 9/26, will start IVF 0.45% NS 75ml/hr x24 hours      INFECTIOUS DISEASE  Leukocytosis  - Elevated WBC likely iso filgrastim (initiated 9/17 for leukopenia) as well as s/p ECMO decannulation, bandemia present but have now downtrended,  however would send cultures if continues to uptrend or febrile  - monitoring off antibiotics for now  - leukopenia now resolved s/p filgrastim (9/17-9/21)  - Bactrim DS discontinued iso leukopenia, continue Mepron for PJP prophylaxis instead  - s/p IV Ceftriaxone 5 days? at [facility] out of an abundance of precaution to cover BAL specimen  - s/p zosyn at OSH (9/12) for lingula PNA  - vancomycin (9/12-9/15 ) d/c'd negative mrsa PCR, and azithro (9/14-9/15) d/c'd neg Urine legionella  - s/p empiric donald (9/13-9/20) now that ANC >1.5  - vanco restarted for ppx on 9/18 d/t leukopenia but stopped on 9/20  - 9/13 and 9/16 urine, sputum and blood cx ngtd  - positive COVID-19 antigen in late August  - f/u BAL, cultures, cytopathology --- so far negative   - ID following- f/u recs    ENDOCRINE  # Hyperglycemia i/s/o steroids  Admission A1c 6.1  - had required insulin gtt currently on NPH 11 with sliding scale, now with NPH lowered to 5u units, will watch glucose closely   - elevated triglycerides 835, has hx of fatty liver, had been on propofol gtt. TG downtrending since initiating insulin gtt for hyperglycemia, now normalizing off propofol  - TSH low initially at 0.13 repeat normal     HEME  #Right IJ thrombus   -s/p argatroban gtt will transition to lovenox today    - INR elevated likely iso argatroban and liver dysfxn, s/p Vit. K (9/14) and FFP x1 (9/15) - now resolved  - platelets x 7 for thrombocytopenia <50,000, last transfused  9/21 continues to improve  - Hgb remains stable   - Heme following- f/u recs    #Leukopenia  #Thrombocytopenia  - Heme consult placed for thrombocytopenia, noted to also be leukopenic but now resolved   - unlikely HLH/MAS since many abnormalities can be explained by severe hypoxemia/hypotension during initial decompensation prior to ecmo cannulation but some features that are consistent and with rheumatic disease it is a possibility to f/u hematology regarding utility of further lab/pathologic testing  - peripheral blood smear reviewed: large platelets noted, no platelet clumps, no blast cells noted, neutrophils noted w/ normal number of lobes, nucleated RBC noted  - acute hepatitis panel negative  - s/p filgrastim 480 daily  given ANC >1500 in the setting of possible infxn   - Thrombocytopenia refractory after intermittent transfusions will monitor now off circuit, platelet count continues to improve    #R/O DVT  - LE duplex negative for DVT   - first VA duplex post decannulation 9/21 shows non-occlusive deep vein thrombosis in the right jugular vein and the right cephalic is non-compressible      MSK  Onset of debilitating b/l hand cramps and some muscles weakness x several months, unclear etiology, radiculopathy? vs myositis?   - MRI outpatient reportedly showed cervical spine issues, started on Prednisone shortly before admission at OSH  - myomarker panel + for RNP and Ku  - seen by neurologist at [facility]   - symptoms improved with cellcept (9/8-9/11) but discontinued given Afib RVR   - consider restarting home Gabapentin to assist with neuropathic pain? once more alert and awake  - f/u with Dr. Nik Marie or Dante Urbano for EMG upon discharge (osh?)      RHEUM  - started on Solumedrol 60mg q6h from 9/1 to 9/6 then weaned over time to then Medrol 32 mg 9/9 to 9/12 at [facility]  - s/p Cellcept 9/8 to 9/11 but stopped due to Afib- RVR/tachycardia   - CT findings, Improved/decreased extent of bilateral ground glass opacities and reticular markings compared to the previous study. Findings are nonspecific but differential etiologies include interstitial pneumonia, drug toxicity, nonspecific connective tissue disorders.  - OSH labs show strongly positive ARIANA, RNP, and anti smith antibodies with low C4 and normal C3. 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  - s/p rituximab 9/16/23  - will plan for next rituximab dose ~9/30/23       SKIN  Reportedly had single episode of painful rash on her shins, ears and neck and chest which resolved with a steroid cream from a dermatologist prior to admission  - no evidence of rash currently  - right groin with breakdown s/p cannula placement        PROPHYLAXIS  # DVT: argatroban  # GI: TF      LINES  -Ecmo Cannulas 9/13-9/21  -[facility] TLC- 9/13-9/21  -Left Ax [place] - 9/13 (placed at OSH)  -RA 16g PIV x2- 9/15      GOC  -Palliative Following  -full code  -partner Kiara at bedside daily and updated on care

## 2023-09-26 NOTE — PROGRESS NOTE ADULT - PROBLEM SELECTOR PLAN 2
Extubated  Using High flow  Per pt +fatigue  Underlying lung condition 2/2 possibly interstitial pneumonia, differential includes NSIP associated with connective tissue disorders, chronic HP or drug toxicity. Bx consistent with organizing PNA  Had plasmapheresis x3  S/p rituximab pending following dose  Rheum following  Pt improving clinically
vvECMO  intubated on MV  Management by primary team.

## 2023-09-27 DIAGNOSIS — R04.0 EPISTAXIS: ICD-10-CM

## 2023-09-27 LAB
ALBUMIN SERPL ELPH-MCNC: 3 G/DL — LOW (ref 3.3–5)
ALBUMIN SERPL ELPH-MCNC: 3.2 G/DL — LOW (ref 3.3–5)
ALP SERPL-CCNC: 644 U/L — HIGH (ref 40–120)
ALP SERPL-CCNC: 763 U/L — HIGH (ref 40–120)
ALT FLD-CCNC: 230 U/L — HIGH (ref 4–33)
ALT FLD-CCNC: 278 U/L — HIGH (ref 4–33)
ANION GAP SERPL CALC-SCNC: 12 MMOL/L — SIGNIFICANT CHANGE UP (ref 7–14)
ANION GAP SERPL CALC-SCNC: 16 MMOL/L — HIGH (ref 7–14)
APPEARANCE UR: ABNORMAL
APTT BLD: 37.6 SEC — HIGH (ref 24.5–35.6)
AST SERPL-CCNC: 151 U/L — HIGH (ref 4–32)
AST SERPL-CCNC: 177 U/L — HIGH (ref 4–32)
BACTERIA # UR AUTO: ABNORMAL /HPF
BASOPHILS # BLD AUTO: 0.11 K/UL — SIGNIFICANT CHANGE UP (ref 0–0.2)
BASOPHILS # BLD AUTO: 0.16 K/UL — SIGNIFICANT CHANGE UP (ref 0–0.2)
BASOPHILS NFR BLD AUTO: 0.6 % — SIGNIFICANT CHANGE UP (ref 0–2)
BASOPHILS NFR BLD AUTO: 0.8 % — SIGNIFICANT CHANGE UP (ref 0–2)
BILIRUB SERPL-MCNC: 13.4 MG/DL — HIGH (ref 0.2–1.2)
BILIRUB SERPL-MCNC: 15.1 MG/DL — HIGH (ref 0.2–1.2)
BILIRUB UR-MCNC: ABNORMAL
BLOOD GAS ARTERIAL - LYTES,HGB,ICA,LACT RESULT: SIGNIFICANT CHANGE UP
BLOOD GAS ARTERIAL - LYTES,HGB,ICA,LACT RESULT: SIGNIFICANT CHANGE UP
BLOOD GAS ARTERIAL COMPREHENSIVE RESULT: SIGNIFICANT CHANGE UP
BUN SERPL-MCNC: 30 MG/DL — HIGH (ref 7–23)
BUN SERPL-MCNC: 32 MG/DL — HIGH (ref 7–23)
CALCIUM SERPL-MCNC: 9.4 MG/DL — SIGNIFICANT CHANGE UP (ref 8.4–10.5)
CALCIUM SERPL-MCNC: 9.4 MG/DL — SIGNIFICANT CHANGE UP (ref 8.4–10.5)
CAST: 0 /LPF — SIGNIFICANT CHANGE UP (ref 0–4)
CHLORIDE SERPL-SCNC: 102 MMOL/L — SIGNIFICANT CHANGE UP (ref 98–107)
CHLORIDE SERPL-SCNC: 97 MMOL/L — LOW (ref 98–107)
CO2 SERPL-SCNC: 26 MMOL/L — SIGNIFICANT CHANGE UP (ref 22–31)
CO2 SERPL-SCNC: 27 MMOL/L — SIGNIFICANT CHANGE UP (ref 22–31)
COLOR SPEC: SIGNIFICANT CHANGE UP
CREAT SERPL-MCNC: 0.43 MG/DL — LOW (ref 0.5–1.3)
CREAT SERPL-MCNC: 0.44 MG/DL — LOW (ref 0.5–1.3)
DIFF PNL FLD: NEGATIVE — SIGNIFICANT CHANGE UP
EGFR: 108 ML/MIN/1.73M2 — SIGNIFICANT CHANGE UP
EGFR: 109 ML/MIN/1.73M2 — SIGNIFICANT CHANGE UP
EOSINOPHIL # BLD AUTO: 0 K/UL — SIGNIFICANT CHANGE UP (ref 0–0.5)
EOSINOPHIL # BLD AUTO: 0 K/UL — SIGNIFICANT CHANGE UP (ref 0–0.5)
EOSINOPHIL NFR BLD AUTO: 0 % — SIGNIFICANT CHANGE UP (ref 0–6)
EOSINOPHIL NFR BLD AUTO: 0 % — SIGNIFICANT CHANGE UP (ref 0–6)
GLUCOSE BLDC GLUCOMTR-MCNC: 137 MG/DL — HIGH (ref 70–99)
GLUCOSE BLDC GLUCOMTR-MCNC: 161 MG/DL — HIGH (ref 70–99)
GLUCOSE BLDC GLUCOMTR-MCNC: 168 MG/DL — HIGH (ref 70–99)
GLUCOSE BLDC GLUCOMTR-MCNC: 231 MG/DL — HIGH (ref 70–99)
GLUCOSE SERPL-MCNC: 131 MG/DL — HIGH (ref 70–99)
GLUCOSE SERPL-MCNC: 220 MG/DL — HIGH (ref 70–99)
GLUCOSE UR QL: NEGATIVE MG/DL — SIGNIFICANT CHANGE UP
HCT VFR BLD CALC: 32.1 % — LOW (ref 34.5–45)
HCT VFR BLD CALC: 35.9 % — SIGNIFICANT CHANGE UP (ref 34.5–45)
HGB BLD-MCNC: 10.4 G/DL — LOW (ref 11.5–15.5)
HGB BLD-MCNC: 11.6 G/DL — SIGNIFICANT CHANGE UP (ref 11.5–15.5)
IANC: 17.21 K/UL — HIGH (ref 1.8–7.4)
IANC: 18.7 K/UL — HIGH (ref 1.8–7.4)
IMM GRANULOCYTES NFR BLD AUTO: 6.2 % — HIGH (ref 0–0.9)
IMM GRANULOCYTES NFR BLD AUTO: 7.2 % — HIGH (ref 0–0.9)
INR BLD: 1.15 RATIO — SIGNIFICANT CHANGE UP (ref 0.85–1.18)
KETONES UR-MCNC: NEGATIVE MG/DL — SIGNIFICANT CHANGE UP
LEUKOCYTE ESTERASE UR-ACNC: NEGATIVE — SIGNIFICANT CHANGE UP
LMWH PPP CHRO-ACNC: 1.14 IU/ML — HIGH (ref 0.5–1.1)
LYMPHOCYTES # BLD AUTO: 0.16 K/UL — LOW (ref 1–3.3)
LYMPHOCYTES # BLD AUTO: 0.51 K/UL — LOW (ref 1–3.3)
LYMPHOCYTES # BLD AUTO: 0.8 % — LOW (ref 13–44)
LYMPHOCYTES # BLD AUTO: 2.6 % — LOW (ref 13–44)
MAGNESIUM SERPL-MCNC: 2.1 MG/DL — SIGNIFICANT CHANGE UP (ref 1.6–2.6)
MCHC RBC-ENTMCNC: 31.3 PG — SIGNIFICANT CHANGE UP (ref 27–34)
MCHC RBC-ENTMCNC: 31.4 PG — SIGNIFICANT CHANGE UP (ref 27–34)
MCHC RBC-ENTMCNC: 32.3 GM/DL — SIGNIFICANT CHANGE UP (ref 32–36)
MCHC RBC-ENTMCNC: 32.4 GM/DL — SIGNIFICANT CHANGE UP (ref 32–36)
MCV RBC AUTO: 96.7 FL — SIGNIFICANT CHANGE UP (ref 80–100)
MCV RBC AUTO: 97.3 FL — SIGNIFICANT CHANGE UP (ref 80–100)
MONOCYTES # BLD AUTO: 0.24 K/UL — SIGNIFICANT CHANGE UP (ref 0–0.9)
MONOCYTES # BLD AUTO: 0.6 K/UL — SIGNIFICANT CHANGE UP (ref 0–0.9)
MONOCYTES NFR BLD AUTO: 1.2 % — LOW (ref 2–14)
MONOCYTES NFR BLD AUTO: 3 % — SIGNIFICANT CHANGE UP (ref 2–14)
NEUTROPHILS # BLD AUTO: 17.21 K/UL — HIGH (ref 1.8–7.4)
NEUTROPHILS # BLD AUTO: 18.7 K/UL — HIGH (ref 1.8–7.4)
NEUTROPHILS NFR BLD AUTO: 86.6 % — HIGH (ref 43–77)
NEUTROPHILS NFR BLD AUTO: 91 % — HIGH (ref 43–77)
NITRITE UR-MCNC: NEGATIVE — SIGNIFICANT CHANGE UP
NRBC # BLD: 0 /100 WBCS — SIGNIFICANT CHANGE UP (ref 0–0)
NRBC # BLD: 0 /100 WBCS — SIGNIFICANT CHANGE UP (ref 0–0)
NRBC # FLD: 0.04 K/UL — HIGH (ref 0–0)
NRBC # FLD: 0.05 K/UL — HIGH (ref 0–0)
PH UR: 7 — SIGNIFICANT CHANGE UP (ref 5–8)
PHOSPHATE SERPL-MCNC: 2.4 MG/DL — LOW (ref 2.5–4.5)
PHOSPHATE SERPL-MCNC: 3 MG/DL — SIGNIFICANT CHANGE UP (ref 2.5–4.5)
PLATELET # BLD AUTO: 206 K/UL — SIGNIFICANT CHANGE UP (ref 150–400)
PLATELET # BLD AUTO: 222 K/UL — SIGNIFICANT CHANGE UP (ref 150–400)
POTASSIUM SERPL-MCNC: 3.8 MMOL/L — SIGNIFICANT CHANGE UP (ref 3.5–5.3)
POTASSIUM SERPL-MCNC: 3.9 MMOL/L — SIGNIFICANT CHANGE UP (ref 3.5–5.3)
POTASSIUM SERPL-SCNC: 3.8 MMOL/L — SIGNIFICANT CHANGE UP (ref 3.5–5.3)
POTASSIUM SERPL-SCNC: 3.9 MMOL/L — SIGNIFICANT CHANGE UP (ref 3.5–5.3)
PROT SERPL-MCNC: 4.7 G/DL — LOW (ref 6–8.3)
PROT SERPL-MCNC: 5.1 G/DL — LOW (ref 6–8.3)
PROT UR-MCNC: NEGATIVE MG/DL — SIGNIFICANT CHANGE UP
PROTHROM AB SERPL-ACNC: 12.9 SEC — SIGNIFICANT CHANGE UP (ref 9.5–13)
RBC # BLD: 3.32 M/UL — LOW (ref 3.8–5.2)
RBC # BLD: 3.69 M/UL — LOW (ref 3.8–5.2)
RBC # FLD: 22.7 % — HIGH (ref 10.3–14.5)
RBC # FLD: 23.4 % — HIGH (ref 10.3–14.5)
RBC CASTS # UR COMP ASSIST: 2 /HPF — SIGNIFICANT CHANGE UP (ref 0–4)
REVIEW: SIGNIFICANT CHANGE UP
SODIUM SERPL-SCNC: 140 MMOL/L — SIGNIFICANT CHANGE UP (ref 135–145)
SODIUM SERPL-SCNC: 140 MMOL/L — SIGNIFICANT CHANGE UP (ref 135–145)
SP GR SPEC: 1.02 — SIGNIFICANT CHANGE UP (ref 1–1.03)
SQUAMOUS # UR AUTO: 0 /HPF — SIGNIFICANT CHANGE UP (ref 0–5)
UROBILINOGEN FLD QL: 4 MG/DL (ref 0.2–1)
WBC # BLD: 19.87 K/UL — HIGH (ref 3.8–10.5)
WBC # BLD: 20.53 K/UL — HIGH (ref 3.8–10.5)
WBC # FLD AUTO: 19.87 K/UL — HIGH (ref 3.8–10.5)
WBC # FLD AUTO: 20.53 K/UL — HIGH (ref 3.8–10.5)
WBC UR QL: 1 /HPF — SIGNIFICANT CHANGE UP (ref 0–5)

## 2023-09-27 PROCEDURE — 99232 SBSQ HOSP IP/OBS MODERATE 35: CPT | Mod: GC

## 2023-09-27 PROCEDURE — 99223 1ST HOSP IP/OBS HIGH 75: CPT | Mod: 25

## 2023-09-27 PROCEDURE — 99291 CRITICAL CARE FIRST HOUR: CPT | Mod: GC

## 2023-09-27 PROCEDURE — 30903 CONTROL OF NOSEBLEED: CPT

## 2023-09-27 RX ORDER — INSULIN LISPRO 100/ML
2 VIAL (ML) SUBCUTANEOUS ONCE
Refills: 0 | Status: COMPLETED | OUTPATIENT
Start: 2023-09-27 | End: 2023-09-27

## 2023-09-27 RX ORDER — CEFTRIAXONE 500 MG/1
1000 INJECTION, POWDER, FOR SOLUTION INTRAMUSCULAR; INTRAVENOUS EVERY 24 HOURS
Refills: 0 | Status: DISCONTINUED | OUTPATIENT
Start: 2023-09-27 | End: 2023-09-29

## 2023-09-27 RX ORDER — PSYLLIUM SEED (WITH DEXTROSE)
1 POWDER (GRAM) ORAL DAILY
Refills: 0 | Status: DISCONTINUED | OUTPATIENT
Start: 2023-09-27 | End: 2023-09-29

## 2023-09-27 RX ORDER — SODIUM CHLORIDE 0.65 %
1 AEROSOL, SPRAY (ML) NASAL EVERY 8 HOURS
Refills: 0 | Status: DISCONTINUED | OUTPATIENT
Start: 2023-09-27 | End: 2023-10-05

## 2023-09-27 RX ORDER — ENOXAPARIN SODIUM 100 MG/ML
100 INJECTION SUBCUTANEOUS
Refills: 0 | Status: DISCONTINUED | OUTPATIENT
Start: 2023-09-27 | End: 2023-10-02

## 2023-09-27 RX ORDER — HUMAN INSULIN 100 [IU]/ML
6 INJECTION, SUSPENSION SUBCUTANEOUS EVERY 6 HOURS
Refills: 0 | Status: DISCONTINUED | OUTPATIENT
Start: 2023-09-27 | End: 2023-10-02

## 2023-09-27 RX ORDER — OXYMETAZOLINE HYDROCHLORIDE 0.5 MG/ML
1 SPRAY NASAL EVERY 12 HOURS
Refills: 0 | Status: DISCONTINUED | OUTPATIENT
Start: 2023-09-27 | End: 2023-09-27

## 2023-09-27 RX ORDER — POTASSIUM CHLORIDE 20 MEQ
20 PACKET (EA) ORAL ONCE
Refills: 0 | Status: COMPLETED | OUTPATIENT
Start: 2023-09-27 | End: 2023-09-27

## 2023-09-27 RX ORDER — OXYMETAZOLINE HYDROCHLORIDE 0.5 MG/ML
1 SPRAY NASAL EVERY 12 HOURS
Refills: 0 | Status: COMPLETED | OUTPATIENT
Start: 2023-09-27 | End: 2023-09-30

## 2023-09-27 RX ORDER — INSULIN LISPRO 100/ML
VIAL (ML) SUBCUTANEOUS EVERY 6 HOURS
Refills: 0 | Status: DISCONTINUED | OUTPATIENT
Start: 2023-09-27 | End: 2023-10-02

## 2023-09-27 RX ORDER — FUROSEMIDE 40 MG
40 TABLET ORAL ONCE
Refills: 0 | Status: COMPLETED | OUTPATIENT
Start: 2023-09-27 | End: 2023-09-27

## 2023-09-27 RX ORDER — POTASSIUM PHOSPHATE, MONOBASIC POTASSIUM PHOSPHATE, DIBASIC 236; 224 MG/ML; MG/ML
15 INJECTION, SOLUTION INTRAVENOUS ONCE
Refills: 0 | Status: COMPLETED | OUTPATIENT
Start: 2023-09-27 | End: 2023-09-27

## 2023-09-27 RX ADMIN — Medication 2: at 12:21

## 2023-09-27 RX ADMIN — Medication 3 MILLILITER(S): at 03:53

## 2023-09-27 RX ADMIN — Medication 2 UNIT(S): at 13:19

## 2023-09-27 RX ADMIN — Medication 1 MILLIGRAM(S): at 12:21

## 2023-09-27 RX ADMIN — Medication 25 MILLIGRAM(S): at 17:33

## 2023-09-27 RX ADMIN — Medication 2: at 17:34

## 2023-09-27 RX ADMIN — HUMAN INSULIN 5 UNIT(S): 100 INJECTION, SUSPENSION SUBCUTANEOUS at 00:06

## 2023-09-27 RX ADMIN — Medication 25 MILLIGRAM(S): at 06:02

## 2023-09-27 RX ADMIN — Medication 20 MILLIEQUIVALENT(S): at 16:26

## 2023-09-27 RX ADMIN — SODIUM CHLORIDE 75 MILLILITER(S): 9 INJECTION, SOLUTION INTRAVENOUS at 00:41

## 2023-09-27 RX ADMIN — Medication 40 MILLIGRAM(S): at 10:52

## 2023-09-27 RX ADMIN — ENOXAPARIN SODIUM 100 MILLIGRAM(S): 100 INJECTION SUBCUTANEOUS at 21:31

## 2023-09-27 RX ADMIN — MIDODRINE HYDROCHLORIDE 20 MILLIGRAM(S): 2.5 TABLET ORAL at 21:31

## 2023-09-27 RX ADMIN — HUMAN INSULIN 5 UNIT(S): 100 INJECTION, SUSPENSION SUBCUTANEOUS at 12:22

## 2023-09-27 RX ADMIN — HUMAN INSULIN 5 UNIT(S): 100 INJECTION, SUSPENSION SUBCUTANEOUS at 06:04

## 2023-09-27 RX ADMIN — Medication 3 MILLILITER(S): at 22:42

## 2023-09-27 RX ADMIN — CHLORHEXIDINE GLUCONATE 1 APPLICATION(S): 213 SOLUTION TOPICAL at 06:04

## 2023-09-27 RX ADMIN — CEFTRIAXONE 100 MILLIGRAM(S): 500 INJECTION, POWDER, FOR SOLUTION INTRAMUSCULAR; INTRAVENOUS at 16:27

## 2023-09-27 RX ADMIN — SODIUM CHLORIDE 75 MILLILITER(S): 9 INJECTION, SOLUTION INTRAVENOUS at 07:40

## 2023-09-27 RX ADMIN — OXYMETAZOLINE HYDROCHLORIDE 1 SPRAY(S): 0.5 SPRAY NASAL at 14:15

## 2023-09-27 RX ADMIN — ATOVAQUONE 1500 MILLIGRAM(S): 750 SUSPENSION ORAL at 12:20

## 2023-09-27 RX ADMIN — MIDODRINE HYDROCHLORIDE 20 MILLIGRAM(S): 2.5 TABLET ORAL at 06:02

## 2023-09-27 RX ADMIN — Medication 80 MILLIGRAM(S): at 06:02

## 2023-09-27 RX ADMIN — HUMAN INSULIN 6 UNIT(S): 100 INJECTION, SUSPENSION SUBCUTANEOUS at 17:34

## 2023-09-27 RX ADMIN — ENOXAPARIN SODIUM 120 MILLIGRAM(S): 100 INJECTION SUBCUTANEOUS at 10:01

## 2023-09-27 RX ADMIN — Medication 15 MILLILITER(S): at 12:20

## 2023-09-27 RX ADMIN — POTASSIUM PHOSPHATE, MONOBASIC POTASSIUM PHOSPHATE, DIBASIC 62.5 MILLIMOLE(S): 236; 224 INJECTION, SOLUTION INTRAVENOUS at 06:03

## 2023-09-27 RX ADMIN — Medication 1: at 00:07

## 2023-09-27 RX ADMIN — Medication 3 MILLILITER(S): at 15:27

## 2023-09-27 RX ADMIN — Medication 1 DROP(S): at 17:35

## 2023-09-27 RX ADMIN — Medication 3 MILLILITER(S): at 09:29

## 2023-09-27 RX ADMIN — PANTOPRAZOLE SODIUM 40 MILLIGRAM(S): 20 TABLET, DELAYED RELEASE ORAL at 12:20

## 2023-09-27 RX ADMIN — Medication 1 DROP(S): at 06:31

## 2023-09-27 RX ADMIN — Medication 1 PACKET(S): at 12:27

## 2023-09-27 RX ADMIN — MIDODRINE HYDROCHLORIDE 20 MILLIGRAM(S): 2.5 TABLET ORAL at 13:51

## 2023-09-27 NOTE — PROGRESS NOTE ADULT - ATTENDING COMMENTS
Agree with above. Seen and examined with team on rounds. Critically ill post ECMO requiring frequent bedside visits. Titrate down Fi02 as tolerated. Send factor X to see if Lovenox dosing is appropriate given obesity and difficult to predict dosing. PT/OT appreciated. Supportive care. Family at the bedside.

## 2023-09-27 NOTE — CONSULT NOTE ADULT - SUBJECTIVE AND OBJECTIVE BOX
CC: Epistaxis    HPI:   62 year old female with a past medical history of afib, and sciatica, who presented to [facility] for shortness of breath. Found to be hypoxic and have interstitial lung disease appearance on CT. patient had worsening hypoxia while in [facility] requiring high PEEP and 100% FiO2 without improvement on saturation. VV ECMO team consulted and patient cannulated at [facility] and transferred to [facility] MICU for further care on 9/12. 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 Showed significant lung improvement and was decannulated 9/21. Extubated 9/22. 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     ENT consulted for intermittent epistaxis, improved with afrin and direct pressure. Patient seen and examined at bedside. Noted to have humidified face tent in place, no active bleeding noted. Denies active bleeding, tasting blood in the back of throat.     PAST MEDICAL & SURGICAL HISTORY:    Allergies    No Known Allergies    Intolerances      MEDICATIONS  (STANDING):  albuterol/ipratropium for Nebulization 3 milliLiter(s) Nebulizer every 6 hours  artificial  tears Solution 1 Drop(s) Both EYES every 12 hours  atovaquone  Suspension 1500 milliGRAM(s) Oral daily  chlorhexidine 2% Cloths 1 Application(s) Topical <User Schedule>  dextrose 5%. 1000 milliLiter(s) (50 mL/Hr) IV Continuous <Continuous>  dextrose 50% Injectable 25 Gram(s) IV Push once  dextrose 50% Injectable 12.5 Gram(s) IV Push once  dextrose 50% Injectable 25 Gram(s) IV Push once  enoxaparin Injectable 120 milliGRAM(s) SubCutaneous every 12 hours  folic acid 1 milliGRAM(s) Oral daily  glucagon  Injectable 1 milliGRAM(s) IntraMuscular once  insulin lispro (ADMELOG) corrective regimen sliding scale   SubCutaneous every 6 hours  insulin NPH human recombinant 5 Unit(s) SubCutaneous every 6 hours  methylPREDNISolone sodium succinate Injectable 80 milliGRAM(s) IV Push daily  metoprolol tartrate 25 milliGRAM(s) Oral two times a day  midodrine 20 milliGRAM(s) Oral every 8 hours  multivitamin/minerals/iron Oral Solution (CENTRUM) 15 milliLiter(s) Oral daily  oxymetazoline 0.05% Nasal Spray 1 Spray(s) Both Nostrils every 12 hours  pantoprazole  Injectable 40 milliGRAM(s) IV Push daily  psyllium Powder 1 Packet(s) Oral daily  senna Syrup 10 milliLiter(s) Oral at bedtime    MEDICATIONS  (PRN):  dextrose Oral Gel 15 Gram(s) Oral once PRN Blood Glucose LESS THAN 70 milliGRAM(s)/deciliter      Social History: unknown if ever smoker    Family history: No pertinent family history in first degree relatives    ROS:   ENT: all negative except as noted in HPI   CV: denies palpitations  Pulm: denies SOB, cough, hemoptysis  GI: denies change in appetite, indigestion, n/v  : denies pertinent urinary symptoms, urgency  Neuro: denies numbness/tingling, loss of sensation  Psych: denies anxiety  MS: denies muscle weakness, instability  Heme: denies easy bruising or bleeding  Endo: denies heat/cold intolerance, excessive sweating  Vascular: denies LE edema    Vital Signs Last 24 Hrs  T(C): 37.1 (27 Sep 2023 08:00), Max: 37.1 (27 Sep 2023 08:00)  T(F): 98.7 (27 Sep 2023 08:00), Max: 98.7 (27 Sep 2023 08:00)  HR: 79 (27 Sep 2023 11:00) (71 - 87)  BP: --  BP(mean): --  RR: 30 (27 Sep 2023 11:00) (18 - 33)  SpO2: 92% (27 Sep 2023 11:00) (92% - 100%)    Parameters below as of 27 Sep 2023 11:00  Patient On (Oxygen Delivery Method): face tent    O2 Concentration (%): 40                          10.4   19.87 )-----------( 206      ( 27 Sep 2023 02:58 )             32.1    09-27    140  |  102  |  30<H>  ----------------------------<  131<H>  3.9   |  26  |  0.43<L>    Ca    9.4      27 Sep 2023 02:58  Phos  2.4     09-27  Mg     2.30     09-26    TPro  4.7<L>  /  Alb  3.0<L>  /  TBili  13.4<H>  /  DBili  x   /  AST  151<H>  /  ALT  230<H>  /  AlkPhos  644<H>  09-27   PT/INR - ( 27 Sep 2023 02:58 )   PT: 12.9 sec;   INR: 1.15 ratio         PTT - ( 27 Sep 2023 02:58 )  PTT:37.6 sec    PHYSICAL EXAM:  Gen: NAD  Skin: + jaundice, No rashes, bruises, or lesions  Head: Normocephalic, Atraumatic  Face: no edema, erythema, or fluctuance. Parotid glands soft without mass  Eyes: no scleral injection, + jaundice  Ears: No mastoid tenderness, erythema, or ear bulging  Nose: NGT in the right nostril, dry bloody crust noted on the left nostril, no active epistaxis.   Mouth: No stridor, no drooling, no trismus.  Mucosa moist, tongue/uvula midline, strong gag reflux,  oropharynx with old blood clot extends from nasopharynx into oropharynx, removed via suction, no active bleeding or clots seen in the oropharynx.   Neck: Flat, supple, no lymphadenopathy, trachea midline, no masses  Lymphatic: No lymphadenopathy  Resp: breathing easily, no stridor  CV: no peripheral edema/cyanosis  GI: nondistended   Peripheral vascular: no JVD or edema  Neuro: facial nerve intact, no facial droop   CC: Epistaxis    HPI:   62 year old female with a past medical history of afib, and sciatica, who presented to [facility] for shortness of breath. Found to be hypoxic and have interstitial lung disease appearance on CT. Patient had worsening hypoxia while in [facility] requiring high PEEP and 100% FiO2 without improvement on saturation. VV ECMO team consulted and patient cannulated at [facility] and transferred to [facility] MICU for further care on 9/12. 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 Showed significant lung improvement and was decannulated 9/21. Extubated 9/22. 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     ENT consulted for intermittent epistaxis, improved with afrin and direct pressure. Patient seen and examined at bedside. Noted to have humidified face tent in place, no active bleeding noted. Denies active bleeding, tasting blood in the back of throat.     PAST MEDICAL & SURGICAL HISTORY:    Allergies    No Known Allergies    Intolerances      MEDICATIONS  (STANDING):  albuterol/ipratropium for Nebulization 3 milliLiter(s) Nebulizer every 6 hours  artificial  tears Solution 1 Drop(s) Both EYES every 12 hours  atovaquone  Suspension 1500 milliGRAM(s) Oral daily  chlorhexidine 2% Cloths 1 Application(s) Topical <User Schedule>  dextrose 5%. 1000 milliLiter(s) (50 mL/Hr) IV Continuous <Continuous>  dextrose 50% Injectable 25 Gram(s) IV Push once  dextrose 50% Injectable 12.5 Gram(s) IV Push once  dextrose 50% Injectable 25 Gram(s) IV Push once  enoxaparin Injectable 120 milliGRAM(s) SubCutaneous every 12 hours  folic acid 1 milliGRAM(s) Oral daily  glucagon  Injectable 1 milliGRAM(s) IntraMuscular once  insulin lispro (ADMELOG) corrective regimen sliding scale   SubCutaneous every 6 hours  insulin NPH human recombinant 5 Unit(s) SubCutaneous every 6 hours  methylPREDNISolone sodium succinate Injectable 80 milliGRAM(s) IV Push daily  metoprolol tartrate 25 milliGRAM(s) Oral two times a day  midodrine 20 milliGRAM(s) Oral every 8 hours  multivitamin/minerals/iron Oral Solution (CENTRUM) 15 milliLiter(s) Oral daily  oxymetazoline 0.05% Nasal Spray 1 Spray(s) Both Nostrils every 12 hours  pantoprazole  Injectable 40 milliGRAM(s) IV Push daily  psyllium Powder 1 Packet(s) Oral daily  senna Syrup 10 milliLiter(s) Oral at bedtime    MEDICATIONS  (PRN):  dextrose Oral Gel 15 Gram(s) Oral once PRN Blood Glucose LESS THAN 70 milliGRAM(s)/deciliter      Social History: unknown if ever smoker    Family history: No pertinent family history in first degree relatives    ROS:   ENT: all negative except as noted in HPI   CV: denies palpitations  Pulm: denies SOB, cough, hemoptysis  GI: denies change in appetite, indigestion, n/v  : denies pertinent urinary symptoms, urgency  Neuro: denies numbness/tingling, loss of sensation  Psych: denies anxiety  MS: denies muscle weakness, instability  Heme: denies easy bruising or bleeding  Endo: denies heat/cold intolerance, excessive sweating  Vascular: denies LE edema    Vital Signs Last 24 Hrs  T(C): 37.1 (27 Sep 2023 08:00), Max: 37.1 (27 Sep 2023 08:00)  T(F): 98.7 (27 Sep 2023 08:00), Max: 98.7 (27 Sep 2023 08:00)  HR: 79 (27 Sep 2023 11:00) (71 - 87)  BP: --  BP(mean): --  RR: 30 (27 Sep 2023 11:00) (18 - 33)  SpO2: 92% (27 Sep 2023 11:00) (92% - 100%)    Parameters below as of 27 Sep 2023 11:00  Patient On (Oxygen Delivery Method): face tent    O2 Concentration (%): 40                          10.4   19.87 )-----------( 206      ( 27 Sep 2023 02:58 )             32.1    09-27    140  |  102  |  30<H>  ----------------------------<  131<H>  3.9   |  26  |  0.43<L>    Ca    9.4      27 Sep 2023 02:58  Phos  2.4     09-27  Mg     2.30     09-26    TPro  4.7<L>  /  Alb  3.0<L>  /  TBili  13.4<H>  /  DBili  x   /  AST  151<H>  /  ALT  230<H>  /  AlkPhos  644<H>  09-27   PT/INR - ( 27 Sep 2023 02:58 )   PT: 12.9 sec;   INR: 1.15 ratio         PTT - ( 27 Sep 2023 02:58 )  PTT:37.6 sec    PHYSICAL EXAM:  Gen: NAD  Skin: + jaundice, No rashes, bruises, or lesions  Head: Normocephalic, Atraumatic  Face: no edema, erythema, or fluctuance. Parotid glands soft without mass  Eyes: no scleral injection, + jaundice  Ears: No mastoid tenderness, erythema, or ear bulging  Nose: NGT in the right nostril, dry bloody crust noted on the left nostril, no active epistaxis.   Mouth: No stridor, no drooling, no trismus.  Mucosa moist, tongue/uvula midline, strong gag reflux,  oropharynx with old blood clot extends from nasopharynx into oropharynx, removed via suction, no active bleeding or clots seen in the oropharynx.   Neck: Flat, supple, no lymphadenopathy, trachea midline, no masses  Lymphatic: No lymphadenopathy  Resp: breathing easily, no stridor  CV: no peripheral edema/cyanosis  GI: nondistended   Peripheral vascular: no JVD or edema  Neuro: facial nerve intact, no facial droop

## 2023-09-27 NOTE — CONSULT NOTE ADULT - ASSESSMENT
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 No active epistaxis on exam. There is a old clot noted in the oropharynx, removed via suction, and no further bleeding noted in the oropharynx.

## 2023-09-27 NOTE — PROGRESS NOTE ADULT - ASSESSMENT
63 yo F w/ Afib initially presented to urgent care for SOB where she had an XR done concerning for b/l lower infiltrates, sent to [facility] ED and admitted to  on 8/26 after being found to be hypoxemic, reported having  debilitating b/l hand numbness/tingling and some muscles weakness several months. She was found to have interstitial lung disease appearance on CT, with the plan for further ILD workup and management, started on prednisone on admission with gradually improving O2 requirements and stable on 2-3LNC. She underwent bronchoscopy with TBBX on 8/30 which showed Non-Specific Intersitial Disease (NSIP)/OP. Labs notable for positive ARIANA 1:1280, RNP > 8, Alejandro > 8. Patient continued on steroids and received cellcept, which was discontinued 2/2 Afib-RVR. Interval CTA chest on 9/11 also negative PE did show possible lingula pneumonia. Started on empiric vancomycin and zosyn 9/12, course was then c/b episode of SVT to 170s w/ rapidly progressive hypoxemic respiratory failure, placed NRB offered HFNC/BiPAP but refused, leading to worsening hypoxemic respiratory failure requiring intubation on 9/13. Despite best practices, patient remained hypoxemic and patient was cannulated for VV-ECMO on 9/13 and transferred to [facility] for further management. 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 Showed significant lung improvement and was decannulated 9/21. Extubated 9/22.     NEUROLOGY  Home meds: gabapentin 100g TID  AA&Ox3 at baseline   - following commands w/ delirium likely iso prolonged hospitalization, sedation, and ICU setting  - required precedex for anxiety, stopped 9/22  - started seroquel 25 mg qhs but dose may be too much given hypotension and lethargy, continue to hold for now, and if restarted will decrease dose to 12.5mg   - CTH 9/14 no acute findings  - Palliative following  - Social Work consult  - PT/OT following - kandi lift to chair, will continue to increase activity as tolerated    PULMONARY  Admitted to [facility] on 8/26 after p/w SOB, found to be hypoxemic, required 2-4L NC/bibap, initially responded well to IV steroids. 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 2/2 bacterial PNA vs atypical PNA vs aspiration vs AEILD continued to show significant improvement from a lung perspective and was decannulated 9/21, and extubated to HFNC 9/22.  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   - 9/20 BAL culture: normal respiratory selvin  - continue duonebs  - s/p empiric abx   - s/p decannulation on 9/21 with plan for suture removal ~7-10 days   - extubated 9/22 to HFNC   - weaned from HFNC to 6L nasal cannula,  placed back on HFNC 50%/40L for increased WOB and po2 60's,  will continue to titrate down O2 as tolerated  - POCUS exam showed small b/l consolidation, and atelectasis. Will encourage use of incentive spirometer     CARDIOVASCULAR  Hx of Afib w at OSH, started on Amiodarone gtt now in shock state requiring requiring pressors likely septic with component of vaspoplegia i/s/o sedation, possible contribution of cardiogenic from RV dysfunction. Now having episodes of SVT responsive to lopressor   - No PE seen on invasive pulmonary angiography at time of ECMO cannulation or in recent imaging  - NO2 weaned off  (9/15) RV function remains unchanged  - s/p milrinone, off since (9/13)  - converted to sinus (9/14) discontinued amio gtt iso bradycardia, back to AF with RVR on 9/19 when sedation weaned off  - continue 25mg metoprolol BID for rate control   - BP labile, has received hydralazine IVP for hypertensive episodes w/ anxiety likely a contributing factor, however is also requiring low dose Phenylephrine  for intermittent episodes of hypotension that occurs mostly while asleep.  - phenylephrine off since (9/25), continue midodrine 20mg TID    - RITCHIE 9/14 : No MEREDITH thrombus noted, negative for PFO. LVOT diameter of 2 cm  - TTE 9/12 with EF 65-70% with normal LV and RV  - TTE 9/14 with  EF 18%. Severe global LV systolic dysfunction. RV enlargement with decreased RV systolic function, however RITCHIE and recent POCUS shows normal LV systolic function, improved RV systolic function   - TTE 9/19 with recovered EF to 67% but still with RV enlargement and systolic dysfunction    GASTROINTESTINAL  Transaminase elevation likely 2/2 combination of shock liver, malposition of ECMO cannula and liver dysfunction  - ALK/ AST/ALT downtrending but remain elevated 490/136/225, T.bili peaked 19.3 mostly direct, and now 13.4  - Acute hepatitis panel negative  - RUQ US 9/15 shows fatty infiltration of liver, negative for hepatobiliary pathology, repeat RUQ US performed 9/22 for worsening LFT's and rising bili with findings negative as well  - Hepatology consulted - likely shock liver with expected delay in improvement in bilirubin  - Hypertriglyceridemia 836, hx of fatty liver and propofol gtt, s/p insulin gtt, now improving  - Last BM noted 9/24  - continue bowel regimen senna and miralax  - CT abdomen 9/15 w/o bowel obstruction, noted with colonic diverticulosis mostly in sigmoid, w/o evidence of diverticulitis  - continue PPI iso steroids   - nutrition following  - failed dysphagia screen x2 currently with KFT in place tolerating tube feeds  - will hold off cineesophagogram and reassess if ready for bedside FEESST in a few days     RENAL  sCr baseline 0.78  - Creatinine wnl  - s/p diuresis with lasix, last administered 9/15, fluid removal via hemo concentrator while on ECMO circuit, will assess daily if lasix is needed   - s/p Bumex 1mg IVP given to optimize post extubation   - good UO with primafit  - hypernatremia improved, free H2O discontinued 9/21  - IVC small 9/26, will start IVF 0.45% NS 75ml/hr x24 hours      INFECTIOUS DISEASE  Leukocytosis  - Elevated WBC likely iso filgrastim (initiated 9/17 for leukopenia) as well as s/p ECMO decannulation, bandemia present but have now downtrended,  however would send cultures if continues to uptrend or febrile  - monitoring off antibiotics for now  - leukopenia now resolved s/p filgrastim (9/17-9/21)  - Bactrim DS discontinued iso leukopenia, continue Mepron for PJP prophylaxis instead  - s/p IV Ceftriaxone 5 days? at [facility] out of an abundance of precaution to cover BAL specimen  - s/p zosyn at OSH (9/12) for lingula PNA  - vancomycin (9/12-9/15 ) d/c'd negative mrsa PCR, and azithro (9/14-9/15) d/c'd neg Urine legionella  - s/p empiric donald (9/13-9/20) now that ANC >1.5  - vanco restarted for ppx on 9/18 d/t leukopenia but stopped on 9/20  - 9/13 and 9/16 urine, sputum and blood cx ngtd  - positive COVID-19 antigen in late August  - f/u BAL, cultures, cytopathology --- so far negative   - ID following- f/u recs    ENDOCRINE  # Hyperglycemia i/s/o steroids  Admission A1c 6.1  - had required insulin gtt currently on NPH 11 with sliding scale, now with NPH lowered to 5u units, will watch glucose closely   - elevated triglycerides 835, has hx of fatty liver, had been on propofol gtt. TG downtrending since initiating insulin gtt for hyperglycemia, now normalizing off propofol  - TSH low initially at 0.13 repeat normal     HEME  #Right IJ thrombus   -s/p argatroban gtt will transition to lovenox today    - INR elevated likely iso argatroban and liver dysfxn, s/p Vit. K (9/14) and FFP x1 (9/15) - now resolved  - platelets x 7 for thrombocytopenia <50,000, last transfused  9/21 continues to improve  - Hgb remains stable   - Heme following- f/u recs    #Leukopenia  #Thrombocytopenia  - Heme consult placed for thrombocytopenia, noted to also be leukopenic but now resolved   - unlikely HLH/MAS since many abnormalities can be explained by severe hypoxemia/hypotension during initial decompensation prior to ecmo cannulation but some features that are consistent and with rheumatic disease it is a possibility to f/u hematology regarding utility of further lab/pathologic testing  - peripheral blood smear reviewed: large platelets noted, no platelet clumps, no blast cells noted, neutrophils noted w/ normal number of lobes, nucleated RBC noted  - acute hepatitis panel negative  - s/p filgrastim 480 daily  given ANC >1500 in the setting of possible infxn   - Thrombocytopenia refractory after intermittent transfusions will monitor now off circuit, platelet count continues to improve    #R/O DVT  - LE duplex negative for DVT   - first VA duplex post decannulation 9/21 shows non-occlusive deep vein thrombosis in the right jugular vein and the right cephalic is non-compressible      MSK  Onset of debilitating b/l hand cramps and some muscles weakness x several months, unclear etiology, radiculopathy? vs myositis?   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   - symptoms improved with cellcept (9/8-9/11) but discontinued given Afib RVR   - consider restarting home Gabapentin to assist with neuropathic pain? once more alert and awake  - f/u with Dr. Nik Marie or Dante Urbano for EMG upon discharge (osh?)      RHEUM  - started on Solumedrol 60mg q6h from 9/1 to 9/6 then weaned over time to then Medrol 32 mg 9/9 to 9/12 at [facility]  - s/p Cellcept 9/8 to 9/11 but stopped due to Afib- RVR/tachycardia   - CT findings, Improved/decreased extent of bilateral ground glass opacities and reticular markings compared to the previous study. Findings are nonspecific but differential etiologies include interstitial pneumonia, drug toxicity, nonspecific connective tissue disorders.  - OSH labs show strongly positive ARIANA, RNP, and anti smith antibodies with low C4 and normal C3. 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  - s/p rituximab 9/16/23  - will plan for next rituximab dose ~9/30/23       SKIN  Reportedly had single episode of painful rash on her shins, ears and neck and chest which resolved with a steroid cream from a dermatologist prior to admission  - no evidence of rash currently  - right groin with breakdown s/p cannula placement        PROPHYLAXIS  # DVT: argatroban  # GI: TF      LINES  -Ecmo Cannulas 9/13-9/21  -[facility] TLC- 9/13-9/21  -Left Ax [place] - 9/13 (placed at OSH)  -RA 16g PIV x2- 9/15      GOC  -Palliative Following  -full code  -partner Kiara at bedside daily and updated on care      61 yo F w/ Afib initially presented to urgent care for SOB where she had an XR done concerning for b/l lower infiltrates, sent to [facility] ED and admitted to  on 8/26 after being found to be hypoxemic, reported having  debilitating b/l hand numbness/tingling and some muscles weakness several months. She was found to have interstitial lung disease appearance on CT, with the plan for further ILD workup and management, started on prednisone on admission with gradually improving O2 requirements and stable on 2-3LNC. She underwent bronchoscopy with TBBX on 8/30 which showed Non-Specific Intersitial Disease (NSIP)/OP. Labs notable for positive ARIANA 1:1280, RNP > 8, Alejandro > 8. Patient continued on steroids and received cellcept, which was discontinued 2/2 Afib-RVR. Interval CTA chest on 9/11 also negative PE did show possible lingula pneumonia. Started on empiric vancomycin and zosyn 9/12, course was then c/b episode of SVT to 170s w/ rapidly progressive hypoxemic respiratory failure, placed NRB offered HFNC/BiPAP but refused, leading to worsening hypoxemic respiratory failure requiring intubation on 9/13. Despite best practices, patient remained hypoxemic and patient was cannulated for VV-ECMO on 9/13 and transferred to [facility] for further management. 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 Showed significant lung improvement and was decannulated 9/21. Extubated 9/22.     NEUROLOGY  Home meds: gabapentin 100g TID  AA&Ox3 at baseline   - following commands w/ delirium likely iso prolonged hospitalization, sedation, and ICU setting  - required precedex for anxiety, stopped 9/22  - started seroquel 25 mg qhs but dose may be too much given hypotension and lethargy, continue to hold for now, and if restarted will decrease dose to 12.5mg   - CTH 9/14 no acute findings  - Palliative following  - Social Work consult  - PT/OT following - kandi lift to chair, will continue to increase activity as tolerated    ENT  #Epistaxis   Spontaneous bleeding of left nare   - relieved after applying pressure   - ENT following - f/u recs  - Afrin Nasal saline spray 1 spray each nare BID  - monitor hgb        PULMONARY  Admitted to [facility] on 8/26 after p/w SOB, found to be hypoxemic, required 2-4L NC/bibap, initially responded well to IV steroids. 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 2/2 bacterial PNA vs atypical PNA vs aspiration vs AEILD continued to show significant improvement from a lung perspective and was decannulated 9/21, and extubated to HFNC 9/22.  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   - 9/20 BAL culture: normal respiratory selvin  - continue duonebs  - s/p empiric abx   - s/p decannulation on 9/21 with plan for suture removal ~7-10 days   - extubated 9/22 to HFNC   - weaned from HFNC to 6L nasal cannula,  placed back on HFNC 50%/40L for increased WOB and po2 60's,  will continue to titrate down O2 as tolerated  - POCUS exam showed small b/l consolidation, and atelectasis. Will encourage use of incentive spirometer     CARDIOVASCULAR  Hx of Afib w at OSH, started on Amiodarone gtt now in shock state requiring requiring pressors likely septic with component of vaspoplegia i/s/o sedation, possible contribution of cardiogenic from RV dysfunction. Now having episodes of SVT responsive to lopressor   - No PE seen on invasive pulmonary angiography at time of ECMO cannulation or in recent imaging  - NO2 weaned off  (9/15) RV function remains unchanged  - s/p milrinone, off since (9/13)  - converted to sinus (9/14) discontinued amio gtt iso bradycardia, back to AF with RVR on 9/19 when sedation weaned off  - continue 25mg metoprolol BID for rate control   - BP labile, has received hydralazine IVP for hypertensive episodes w/ anxiety likely a contributing factor, however is also requiring low dose Phenylephrine  for intermittent episodes of hypotension that occurs mostly while asleep.  - phenylephrine off since (9/25), continue midodrine 20mg TID    - RITCHIE 9/14 : No MEREDITH thrombus noted, negative for PFO. LVOT diameter of 2 cm  - TTE 9/12 with EF 65-70% with normal LV and RV  - TTE 9/14 with  EF 18%. Severe global LV systolic dysfunction. RV enlargement with decreased RV systolic function, however RITCHIE and recent POCUS shows normal LV systolic function, improved RV systolic function   - TTE 9/19 with recovered EF to 67% but still with RV enlargement and systolic dysfunction    GASTROINTESTINAL  Transaminase elevation likely 2/2 combination of shock liver, malposition of ECMO cannula and liver dysfunction  - ALK/ AST/ALT downtrending but remain elevated 490/136/225, T.bili peaked 19.3 mostly direct, and now 13.4  - Acute hepatitis panel negative  - RUQ US 9/15 shows fatty infiltration of liver, negative for hepatobiliary pathology, repeat RUQ US performed 9/22 for worsening LFT's and rising bili with findings negative as well  - Hepatology consulted - likely shock liver with expected delay in improvement in bilirubin  - Hypertriglyceridemia 836, hx of fatty liver and propofol gtt, s/p insulin gtt, now improving  - Last BM noted 9/24  - continue bowel regimen senna and miralax  - CT abdomen 9/15 w/o bowel obstruction, noted with colonic diverticulosis mostly in sigmoid, w/o evidence of diverticulitis  - continue PPI iso steroids   - nutrition following  - failed dysphagia screen x2 currently with KFT in place tolerating tube feeds  - will hold off cineesophagogram and reassess if ready for bedside FEESST in a few days     RENAL  sCr baseline 0.78  - Creatinine wnl  - s/p diuresis with lasix, last administered 9/15, fluid removal via hemo concentrator while on ECMO circuit, will assess daily if lasix is needed   - s/p Bumex 1mg IVP given to optimize post extubation   - good UO with primafit  - hypernatremia improved, free H2O discontinued 9/21  - IVC small 9/26, will start IVF 0.45% NS 75ml/hr x24 hours      INFECTIOUS DISEASE  Leukocytosis  - Elevated WBC likely iso filgrastim (initiated 9/17 for leukopenia) as well as s/p ECMO decannulation, bandemia present but have now downtrended   - Afebrile, however blood and urine cultures sent given uptrend of WBC  - follow up blood and urine cultures sent 9/27  - previous cultures, BAL, cytopathology so far negative  - monitoring off antibiotics for now  - leukopenia now resolved s/p filgrastim (9/17-9/21)  - Bactrim DS discontinued iso leukopenia, continue Mepron for PJP prophylaxis instead  - s/p IV Ceftriaxone 5 days? at [facility] out of an abundance of precaution to cover BAL specimen  - s/p zosyn at OSH (9/12) for lingula PNA  - vancomycin (9/12-9/15 ) d/c'd negative mrsa PCR, and azithro (9/14-9/15) d/c'd neg Urine legionella  - s/p empiric donald (9/13-9/20) now that ANC >1.5  - vanco restarted for ppx on 9/18 d/t leukopenia but stopped on 9/20  - 9/13 and 9/16 urine, sputum and blood cx ngtd  - positive COVID-19 antigen in late August  - ID following- f/u recs    ENDOCRINE  # Hyperglycemia i/s/o steroids  Admission A1c 6.1  - had required insulin gtt currently on NPH 11 with sliding scale, now with NPH lowered to 5u units, will watch glucose closely   - elevated triglycerides 835, has hx of fatty liver, had been on propofol gtt. TG downtrending since initiating insulin gtt for hyperglycemia, now normalizing off propofol  - TSH low initially at 0.13 repeat normal     HEME  #Right IJ thrombus   -s/p argatroban gtt , transitioned to lovenox   - will hold evening lovenox pending Anti Xa results given noted epistaxis  - INR elevated likely iso argatroban and liver dysfxn, s/p Vit. K (9/14) and FFP x1 (9/15) - now resolved  - platelets x 7 for thrombocytopenia <50,000, last transfused  9/21 continues to improve  - Hgb remains stable   - Heme following- f/u recs    #Leukopenia  #Thrombocytopenia  - Heme consult placed for thrombocytopenia, noted to also be leukopenic but now resolved   - unlikely HLH/MAS since many abnormalities can be explained by severe hypoxemia/hypotension during initial decompensation prior to ecmo cannulation but some features that are consistent and with rheumatic disease it is a possibility to f/u hematology regarding utility of further lab/pathologic testing  - peripheral blood smear reviewed: large platelets noted, no platelet clumps, no blast cells noted, neutrophils noted w/ normal number of lobes, nucleated RBC noted  - acute hepatitis panel negative  - s/p filgrastim 480 daily  given ANC >1500 in the setting of possible infxn   - Thrombocytopenia refractory after intermittent transfusions will monitor now off circuit, platelet count continues to improve    #R/O DVT  - LE duplex negative for DVT   - first VA duplex post decannulation 9/21 shows non-occlusive deep vein thrombosis in the right jugular vein and the right cephalic is non-compressible      MSK  Onset of debilitating b/l hand cramps and some muscles weakness x several months, unclear etiology, radiculopathy? vs myositis?   - MRI outpatient reportedly showed cervical spine issues, started on Prednisone shortly before admission at OSH  - myomarker panel + for RNP and Ku  - seen by neurologist at [facility]   - symptoms improved with cellcept (9/8-9/11) but discontinued given Afib RVR   - consider restarting home Gabapentin to assist with neuropathic pain? once more alert and awake  - f/u with Dr. Nik Marie or Dante Urbano for EMG upon discharge (osh?)      RHEUM  - started on Solumedrol 60mg q6h from 9/1 to 9/6 then weaned over time to then Medrol 32 mg 9/9 to 9/12 at [facility]  - s/p Cellcept 9/8 to 9/11 but stopped due to Afib- RVR/tachycardia   - CT findings, Improved/decreased extent of bilateral ground glass opacities and reticular markings compared to the previous study. Findings are nonspecific but differential etiologies include interstitial pneumonia, drug toxicity, nonspecific connective tissue disorders.  - OSH labs show strongly positive ARIANA, RNP, and anti smith antibodies with low C4 and normal C3. 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  - s/p rituximab 9/16/23  - will plan for next rituximab dose ~9/30/23       SKIN  Reportedly had single episode of painful rash on her shins, ears and neck and chest which resolved with a steroid cream from a dermatologist prior to admission  - no evidence of rash currently  - right groin with breakdown s/p cannula placement        PROPHYLAXIS  # DVT: argatroban  # GI: TF      LINES  -Ecmo Cannulas 9/13-9/21  -[facility] TLC- 9/13-9/21  -Left Ax Traci - 9/13 (placed at OSH)  -RA 16g PIV x2- 9/15      [facility]  -Palliative Following  -full code  -partner Kiara at bedside daily and updated on care      61 yo F w/ Afib initially presented to urgent care for SOB where she had an XR done concerning for b/l lower infiltrates, sent to [facility] ED and admitted to  on 8/26 after being found to be hypoxemic, reported having  debilitating b/l hand numbness/tingling and some muscles weakness several months. She was found to have interstitial lung disease appearance on CT, with the plan for further ILD workup and management, started on prednisone on admission with gradually improving O2 requirements and stable on 2-3LNC. She underwent bronchoscopy with TBBX on 8/30 which showed Non-Specific Intersitial Disease (NSIP)/OP. Labs notable for positive ARIANA 1:1280, RNP > 8, Alejandro > 8. Patient continued on steroids and received cellcept, which was discontinued 2/2 Afib-RVR. Interval CTA chest on 9/11 also negative PE did show possible lingula pneumonia. Started on empiric vancomycin and zosyn 9/12, course was then c/b episode of SVT to 170s w/ rapidly progressive hypoxemic respiratory failure, placed NRB offered HFNC/BiPAP but refused, leading to worsening hypoxemic respiratory failure requiring intubation on 9/13. Despite best practices, patient remained hypoxemic and patient was cannulated for VV-ECMO on 9/13 and transferred to [facility] for further management. 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 Showed significant lung improvement and was decannulated 9/21. Extubated 9/22.     NEUROLOGY  Home meds: gabapentin 100g TID  AA&Ox3 at baseline   - following commands w/ delirium likely iso prolonged hospitalization, sedation, and ICU setting  - required precedex for anxiety, stopped 9/22  - started seroquel 25 mg qhs but dose may be too much given hypotension and lethargy, continue to hold for now, and if restarted will decrease dose to 12.5mg   - CTH 9/14 no acute findings  - Palliative following  - Social Work consult  - PT/OT following - kandi lift to chair, will continue to increase activity as tolerated    ENT  #Epistaxis   Spontaneous bleeding of left nare   - relieved after applying pressure   - ENT following - f/u recs  - Afrin Nasal saline spray 1 spray each nare BID  - monitor hgb        PULMONARY  Admitted to [facility] on 8/26 after p/w SOB, found to be hypoxemic, required 2-4L NC/bibap, initially responded well to IV steroids. 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 2/2 bacterial PNA vs atypical PNA vs aspiration vs AEILD continued to show significant improvement from a lung perspective and was decannulated 9/21, and extubated to HFNC 9/22.  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   - 9/20 BAL culture: normal respiratory selvin  - continue duonebs  - s/p empiric abx   - s/p decannulation on 9/21 with plan for suture removal ~7-10 days   - extubated 9/22 to HFNC   - weaned from HFNC to 6L nasal cannula,  placed back on HFNC 50%/40L for increased WOB and po2 60's,  will continue to titrate down O2 as tolerated  - POCUS exam showed small b/l consolidation, and atelectasis. Will encourage use of incentive spirometer     CARDIOVASCULAR  Hx of Afib w at OSH, started on Amiodarone gtt now in shock state requiring requiring pressors likely septic with component of vaspoplegia i/s/o sedation, possible contribution of cardiogenic from RV dysfunction. Now having episodes of SVT responsive to lopressor   - No PE seen on invasive pulmonary angiography at time of ECMO cannulation or in recent imaging  - NO2 weaned off  (9/15) RV function remains unchanged  - s/p milrinone, off since (9/13)  - converted to sinus (9/14) discontinued amio gtt iso bradycardia, back to AF with RVR on 9/19 when sedation weaned off  - continue 25mg metoprolol BID for rate control   - BP labile, has received hydralazine IVP for hypertensive episodes w/ anxiety likely a contributing factor, however is also requiring low dose Phenylephrine  for intermittent episodes of hypotension that occurs mostly while asleep.  - phenylephrine off since (9/25), continue midodrine 20mg TID    - RITCHIE 9/14 : No MEREDITH thrombus noted, negative for PFO. LVOT diameter of 2 cm  - TTE 9/12 with EF 65-70% with normal LV and RV  - TTE 9/14 with  EF 18%. Severe global LV systolic dysfunction. RV enlargement with decreased RV systolic function, however RITCHIE and recent POCUS shows normal LV systolic function, improved RV systolic function   - TTE 9/19 with recovered EF to 67% but still with RV enlargement and systolic dysfunction    GASTROINTESTINAL  Transaminase elevation likely 2/2 combination of shock liver, malposition of ECMO cannula and liver dysfunction  - ALK/ AST/ALT downtrending but remain elevated 490/136/225, T.bili peaked 19.3 mostly direct, and now 13.4  - Acute hepatitis panel negative  - RUQ US 9/15 shows fatty infiltration of liver, negative for hepatobiliary pathology, repeat RUQ US performed 9/22 for worsening LFT's and rising bili with findings negative as well  - Hepatology consulted - likely shock liver with expected delay in improvement in bilirubin  - Hypertriglyceridemia 836, hx of fatty liver and propofol gtt, s/p insulin gtt, now improving  - Last BM noted 9/24  - continue bowel regimen senna and miralax  - CT abdomen 9/15 w/o bowel obstruction, noted with colonic diverticulosis mostly in sigmoid, w/o evidence of diverticulitis  - continue PPI iso steroids   - nutrition following  - failed dysphagia screen x2 currently with KFT in place tolerating tube feeds  - will hold off cineesophagogram and reassess if ready for bedside FEESST in a few days     RENAL  sCr baseline 0.78  - Creatinine wnl  - s/p diuresis with lasix, last administered 9/15, fluid removal via hemo concentrator while on ECMO circuit, will assess daily if lasix is needed   - s/p Bumex 1mg IVP given to optimize post extubation   - good UO with primafit  - hypernatremia improved, free H2O discontinued 9/21  - IVC small 9/26, will start IVF 0.45% NS 75ml/hr x24 hours      INFECTIOUS DISEASE  Leukocytosis  - Elevated WBC likely iso filgrastim (initiated 9/17 for leukopenia) as well as s/p ECMO decannulation, bandemia present but have now downtrended   - Afebrile, however blood and urine cultures sent given uptrend of WBC  - follow up blood and urine cultures sent 9/27  - previous cultures, BAL, cytopathology so far negative  - monitoring off antibiotics for now  - leukopenia now resolved s/p filgrastim (9/17-9/21)  - Bactrim DS discontinued iso leukopenia, continue Mepron for PJP prophylaxis instead  - s/p IV Ceftriaxone 5 days? at [facility] out of an abundance of precaution to cover BAL specimen  - s/p zosyn at OSH (9/12) for lingula PNA  - vancomycin (9/12-9/15 ) d/c'd negative mrsa PCR, and azithro (9/14-9/15) d/c'd neg Urine legionella  - s/p empiric donald (9/13-9/20) now that ANC >1.5  - vanco restarted for ppx on 9/18 d/t leukopenia but stopped on 9/20  - 9/13 and 9/16 urine, sputum and blood cx ngtd  - positive COVID-19 antigen in late August  - ID following- f/u recs    ENDOCRINE  # Hyperglycemia i/s/o steroids  Admission A1c 6.1  - s/p insulin gtt, transitioned to NPH 11 w/ISS,  NPH eventually lowered to 5u units, and now increased to 6units w/mod ISS given glucose >200 iso increase TF rate and hyperglycemia  - adjust insulin and ISS as steroids are tapered  - elevated triglycerides 835, has hx of fatty liver, had been on propofol gtt. TG downtrending since initiating insulin gtt for hyperglycemia, now normalizing off propofol  - TSH low initially at 0.13 repeat normal     HEME  #Right IJ thrombus   -s/p argatroban gtt , transitioned to lovenox   - will hold evening lovenox pending Anti Xa results given noted epistaxis  - INR elevated likely iso argatroban and liver dysfxn, s/p Vit. K (9/14) and FFP x1 (9/15) - now resolved  - platelets x 7 for thrombocytopenia <50,000, last transfused  9/21 continues to improve  - Hgb remains stable   - Heme following- f/u recs    #Leukopenia  #Thrombocytopenia  - Heme consult placed for thrombocytopenia, noted to also be leukopenic but now resolved   - unlikely HLH/MAS since many abnormalities can be explained by severe hypoxemia/hypotension during initial decompensation prior to ecmo cannulation but some features that are consistent and with rheumatic disease it is a possibility to f/u hematology regarding utility of further lab/pathologic testing  - peripheral blood smear reviewed: large platelets noted, no platelet clumps, no blast cells noted, neutrophils noted w/ normal number of lobes, nucleated RBC noted  - acute hepatitis panel negative  - s/p filgrastim 480 daily  given ANC >1500 in the setting of possible infxn   - Thrombocytopenia refractory after intermittent transfusions will monitor now off circuit, platelet count continues to improve    #R/O DVT  - LE duplex negative for DVT   - first VA duplex post decannulation 9/21 shows non-occlusive deep vein thrombosis in the right jugular vein and the right cephalic is non-compressible      MSK  Onset of debilitating b/l hand cramps and some muscles weakness x several months, unclear etiology, radiculopathy? vs myositis?   - MRI outpatient reportedly showed cervical spine issues, started on Prednisone shortly before admission at OSH  - myomarker panel + for RNP and Ku  - seen by neurologist at [facility]   - symptoms improved with cellcept (9/8-9/11) but discontinued given Afib RVR   - consider restarting home Gabapentin to assist with neuropathic pain? once more alert and awake  - f/u with Dr. Nik Marie or Dante Urbano for EMG upon discharge (osh?)      RHEUM  - started on Solumedrol 60mg q6h from 9/1 to 9/6 then weaned over time to then Medrol 32 mg 9/9 to 9/12 at H  - s/p Cellcept 9/8 to 9/11 but stopped due to Afib- RVR/tachycardia   - CT findings, Improved/decreased extent of bilateral ground glass opacities and reticular markings compared to the previous study. Findings are nonspecific but differential etiologies include interstitial pneumonia, drug toxicity, nonspecific connective tissue disorders.  - OSH labs show strongly positive ARIANA, RNP, and anti smith antibodies with low C4 and normal C3. 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  - s/p rituximab 9/16/23  - will plan for next rituximab dose ~9/30/23       SKIN  Reportedly had single episode of painful rash on her shins, ears and neck and chest which resolved with a steroid cream from a dermatologist prior to admission  - no evidence of rash currently  - right groin with breakdown s/p cannula placement        PROPHYLAXIS  # DVT: argatroban  # GI: TF      LINES  -Ecmo Cannulas 9/13-9/21  -[facility] TLC- 9/13-9/21  -Left Ax Traci - 9/13 (placed at OSH)  -RA 16g PIV x2- 9/15      GOC  -Palliative Following  -full code  -partner Kiara at bedside daily and updated on care      63 yo F w/ Afib initially presented to urgent care for SOB where she had an XR done concerning for b/l lower infiltrates, sent to [facility] ED and admitted to  on 8/26 after being found to be hypoxemic, reported having  debilitating b/l hand numbness/tingling and some muscles weakness several months. She was found to have interstitial lung disease appearance on CT, with the plan for further ILD workup and management, started on prednisone on admission with gradually improving O2 requirements and stable on 2-3LNC. She underwent bronchoscopy with TBBX on 8/30 which showed Non-Specific Intersitial Disease (NSIP)/OP. Labs notable for positive ARIANA 1:1280, RNP > 8, Alejandro > 8. Patient continued on steroids and received cellcept, which was discontinued 2/2 Afib-RVR. Interval CTA chest on 9/11 also negative PE did show possible lingula pneumonia. Started on empiric vancomycin and zosyn 9/12, course was then c/b episode of SVT to 170s w/ rapidly progressive hypoxemic respiratory failure, placed NRB offered HFNC/BiPAP but refused, leading to worsening hypoxemic respiratory failure requiring intubation on 9/13. Despite best practices, patient remained hypoxemic and patient was cannulated for VV-ECMO on 9/13 and transferred to [facility] for further management. 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 Showed significant lung improvement and was decannulated 9/21. Extubated 9/22.     NEUROLOGY  Home meds: gabapentin 100g TID  AA&Ox3 at baseline   - following commands w/ delirium likely iso prolonged hospitalization, sedation, and ICU setting  - required precedex for anxiety, stopped 9/22  - started seroquel 25 mg qhs, dose may have been too much given hypotension and lethargy, continue to hold for now, and if requires seroquel, will restart at lower dose of 12.5mg   - CTH 9/14 no acute findings  - Palliative following  - Social Work consult  - PT/OT following - c/w kandi lift to chair twice daily, increase activity as tolerated    ENT  #Epistaxis   - small amount of intermittent bleeding noted in left nare and hemoptysis? relieved after applying pressure   - ENT consult - f/u recs  - humidified air provided via face tent  - Nasal saline spray 1 spray each nose PRN   - Afrin 1 spray for rebleeding followed by direct pressure on the nose, if still actively bleeding, call ENT for reassessment  - Can consider use of nebulized TXA every 8 hours as needed if epistaxis not controlled by afrin and direct pressure.  - monitor hgb and for signs of overt bleeding closely       PULMONARY  Admitted to [facility] on 8/26 after p/w SOB, found to be hypoxemic, required 2-4L NC/bibap, initially responded well to IV steroids. 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 2/2 bacterial PNA vs atypical PNA vs aspiration vs AEILD continued to show significant improvement from a lung perspective and was decannulated 9/21, and extubated to HFNC 9/22.  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   - 9/20 BAL culture: normal respiratory selvin  - continue duonebs  - s/p empiric abx   - s/p decannulation on 9/21 with plan for suture removal ~7-10 days   - extubated 9/22 to HFNC   - weaned from HFNC to 6L nasal cannula,  placed back on HFNC 50%/40L for increased WOB and po2 60's,  will continue to titrate down O2 as tolerated  - POCUS exam showed small b/l consolidation, and atelectasis. Will encourage use of incentive spirometer     CARDIOVASCULAR  Hx of Afib w at OSH, started on Amiodarone gtt now in shock state requiring requiring pressors likely septic with component of vaspoplegia i/s/o sedation, possible contribution of cardiogenic from RV dysfunction. Now having episodes of SVT responsive to lopressor   - No PE seen on invasive pulmonary angiography at time of ECMO cannulation or in recent imaging  - NO2 weaned off  (9/15) RV function remains unchanged  - s/p milrinone, off since (9/13)  - converted to sinus (9/14) discontinued amio gtt iso bradycardia, back to AF with RVR on 9/19 when sedation weaned off  - continue 25mg metoprolol BID for rate control   - BP labile, has received hydralazine IVP for hypertensive episodes w/ anxiety likely a contributing factor, however is also requiring low dose Phenylephrine  for intermittent episodes of hypotension that occurs mostly while asleep.  - phenylephrine off since (9/25), continue midodrine 20mg TID    - RITCHIE 9/14 : No MEREDITH thrombus noted, negative for PFO. LVOT diameter of 2 cm  - TTE 9/12 with EF 65-70% with normal LV and RV  - TTE 9/14 with  EF 18%. Severe global LV systolic dysfunction. RV enlargement with decreased RV systolic function, however RITCHIE and recent POCUS shows normal LV systolic function, improved RV systolic function   - TTE 9/19 with recovered EF to 67% but still with RV enlargement and systolic dysfunction    GASTROINTESTINAL  Transaminase elevation likely 2/2 combination of shock liver, malposition of ECMO cannula and liver dysfunction  - ALK/ AST/ALT downtrending but remain elevated 490/136/225, T.bili peaked 19.3 mostly direct, and now 13.4  - Acute hepatitis panel negative  - RUQ US 9/15 shows fatty infiltration of liver, negative for hepatobiliary pathology, repeat RUQ US performed 9/22 for worsening LFT's and rising bili with findings negative as well  - Hepatology consulted - likely shock liver with expected delay in improvement in bilirubin  - Hypertriglyceridemia 836, hx of fatty liver and propofol gtt, s/p insulin gtt, now improving  - Last BM noted 9/24  - continue bowel regimen senna and miralax  - CT abdomen 9/15 w/o bowel obstruction, noted with colonic diverticulosis mostly in sigmoid, w/o evidence of diverticulitis  - continue PPI iso steroids   - nutrition following  - failed dysphagia screen x2 currently with KFT in place tolerating tube feeds  - will hold off cineesophagogram and reassess if ready for bedside FEESST in a few days     RENAL  sCr baseline 0.78  - Creatinine wnl  - s/p diuresis with lasix, last administered 9/15, fluid removal via hemo concentrator while on ECMO circuit, will assess daily if lasix is needed   - s/p Bumex 1mg IVP given to optimize post extubation   - good UO with primafit  - hypernatremia improved, free H2O discontinued 9/21  - IVF fluids started 9/26 for small IVC w/ negative fluid balance, now d/c'd concern for fluid overload, currently positive +2L/24 hours w/ worsening b-lines  on lung ultrasound      INFECTIOUS DISEASE  Leukocytosis  - Elevated WBC likely iso filgrastim (initiated 9/17 for leukopenia) as well as s/p ECMO decannulation, bandemia present but have now downtrended   - Afebrile, however blood and urine cultures were sent given uptrend of WBC  - UA+ (many bacteria), can start CTX 1gm IVPB daily (9/27-  )  - follow up blood and urine cultures sent 9/27  - previous cultures, BAL, cytopathology so far negative  - leukopenia now resolved s/p filgrastim (9/17-9/21)  - Bactrim DS discontinued iso leukopenia, continue Mepron for PJP prophylaxis instead  - s/p IV Ceftriaxone 5 days? at [facility] out of an abundance of precaution to cover BAL specimen  - s/p zosyn at OSH (9/12) for lingula PNA  - vancomycin (9/12-9/15 ) d/c'd negative mrsa PCR, and azithro (9/14-9/15) d/c'd neg Urine legionella  - s/p empiric donald (9/13-9/20) now that ANC >1.5  - vanco restarted for ppx on 9/18 d/t leukopenia but stopped on 9/20  - positive COVID-19 antigen in late August  - ID following- f/u recs    ENDOCRINE  # Hyperglycemia i/s/o steroids  Admission A1c 6.1  - s/p insulin gtt, transitioned to NPH 11 w/ISS,  NPH was lowered to 5u units, and now increased to 6units w/mod ISS given glucose >200 iso increase TF rate and hyperglycemia  - continue to adjust insulin and ISS as steroids are tapered and diet changes  - elevated triglycerides 835, has hx of fatty liver, had been on propofol gtt. TG downtrending since initiating insulin gtt for hyperglycemia, now normalizing off propofol  - TSH low initially at 0.13 repeat normal     HEME  #Right IJ thrombus   -s/p argatroban gtt , transitioned to lovenox   - Lowered lovenox dose 120mg BID to 100mg BID based on elevated Anti Xa level (1.14), will need redraw Anti Xa Friday 9/29/23, four hours after 4th dose lovenox 100mg adminstered  - INR elevated likely iso argatroban and liver dysfxn, s/p Vit. K (9/14) and FFP x1 (9/15) - now resolved  - platelets x 7 for thrombocytopenia <50,000, last transfused  9/21 continues to improve  - Intermittent episodes of epistaxis, no overt bleeding noted. Hgb remains stable   - Heme following- f/u recs    #Leukopenia  #Thrombocytopenia  - Heme consult placed for thrombocytopenia, noted to also be leukopenic but now resolved   - unlikely HLH/MAS since many abnormalities can be explained by severe hypoxemia/hypotension during initial decompensation prior to ecmo cannulation but some features that are consistent and with rheumatic disease it is a possibility to f/u hematology regarding utility of further lab/pathologic testing  - peripheral blood smear reviewed: large platelets noted, no platelet clumps, no blast cells noted, neutrophils noted w/ normal number of lobes, nucleated RBC noted  - acute hepatitis panel negative  - s/p filgrastim 480 daily  given ANC >1500 in the setting of possible infxn   - Thrombocytopenia refractory after intermittent transfusions will monitor now off circuit, platelet count continues to improve    #R/O DVT  - LE duplex negative for DVT   - first VA duplex post decannulation 9/21 shows non-occlusive deep vein thrombosis in the right jugular vein and the right cephalic is non-compressible  - c/w AC      MSK  Onset of debilitating b/l hand cramps and some muscles weakness x several months, unclear etiology, radiculopathy? vs myositis?   - MRI outpatient reportedly showed cervical spine issues, started on Prednisone shortly before admission at OSH  - myomarker panel + for RNP and Ku  - seen by neurologist at [facility]   - symptoms improved with cellcept (9/8-9/11) but discontinued given Afib RVR   - consider restarting home Gabapentin to assist with neuropathic pain? once more alert and awake  - f/u with Dr. Nik Marie or Dante Urbano for EMG upon discharge (osh?)      RHEUM  - started on Solumedrol 60mg q6h from 9/1 to 9/6 then weaned over time to then Medrol 32 mg 9/9 to 9/12 at H  - s/p Cellcept 9/8 to 9/11 but stopped due to Afib- RVR/tachycardia   - CT findings, Improved/decreased extent of bilateral ground glass opacities and reticular markings compared to the previous study. Findings are nonspecific but differential etiologies include interstitial pneumonia, drug toxicity, nonspecific connective tissue disorders.  - OSH labs show strongly positive ARIANA, RNP, and anti smith antibodies with low C4 and normal C3. 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  - s/p rituximab 9/16/23  - will plan for next rituximab dose ~9/30/23       SKIN  Reportedly had single episode of painful rash on her shins, ears and neck and chest which resolved with a steroid cream from a dermatologist prior to admission  - no evidence of rash currently  - right groin with breakdown s/p cannula placement        PROPHYLAXIS  # DVT: argatroban  # GI: TF      LINES  -Ecmo Cannulas 9/13-9/21  -LIJ TLC- 9/13-9/21  -Left Ax Traci - 9/13 (placed at OSH)  -RA 16g PIV x2- 9/15      GOC  -Palliative Following  -full code  -partner Kiara at bedside daily and updated on care      61 yo F w/ Afib initially presented to urgent care for SOB where she had an XR done concerning for b/l lower infiltrates, sent to [facility] ED and admitted to  on 8/26 after being found to be hypoxemic, reported having  debilitating b/l hand numbness/tingling and some muscles weakness several months. She was found to have interstitial lung disease appearance on CT, with the plan for further ILD workup and management, started on prednisone on admission with gradually improving O2 requirements and stable on 2-3LNC. She underwent bronchoscopy with TBBX on 8/30 which showed Non-Specific Intersitial Disease (NSIP)/OP. Labs notable for positive ARIANA 1:1280, RNP > 8, Alejandro > 8. Patient continued on steroids and received cellcept, which was discontinued 2/2 Afib-RVR. Interval CTA chest on 9/11 also negative PE did show possible lingula pneumonia. Started on empiric vancomycin and zosyn 9/12, course was then c/b episode of SVT to 170s w/ rapidly progressive hypoxemic respiratory failure, placed NRB offered HFNC/BiPAP but refused, leading to worsening hypoxemic respiratory failure requiring intubation on 9/13. Despite best practices, patient remained hypoxemic and patient was cannulated for VV-ECMO on 9/13 and transferred to [facility] for further management. 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 Showed significant lung improvement and was decannulated 9/21. Extubated 9/22.     NEUROLOGY  Home meds: gabapentin 100g TID  AA&Ox3 at baseline   - following commands w/ delirium likely iso prolonged hospitalization, sedation, and ICU setting  - required precedex for anxiety, stopped 9/22  - started seroquel 25 mg qhs, dose may have been too much given hypotension and lethargy, continue to hold for now, and if requires seroquel, will restart at lower dose of 12.5mg   - CTH 9/14 no acute findings  - Palliative following  - Social Work consult  - PT/OT following - c/w kandi lift to chair twice daily, increase activity as tolerated    ENT  #Epistaxis   - small amount of intermittent bleeding noted in left nare and hemoptysis? relieved after applying pressure   - ENT consult - f/u recs  - humidified air provided via face tent  - Nasal saline spray 1 spray each nose PRN   - Afrin 1 spray for rebleeding followed by direct pressure on the nose, if still actively bleeding, call ENT for reassessment  - Can consider use of nebulized TXA every 8 hours as needed if epistaxis not controlled by afrin and direct pressure.  - monitor hgb and for signs of overt bleeding closely       PULMONARY  Admitted to [facility] on 8/26 after p/w SOB, found to be hypoxemic, required 2-4L NC/bibap, initially responded well to IV steroids. 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 2/2 bacterial PNA vs atypical PNA vs aspiration vs AEILD continued to show significant improvement from a lung perspective and was decannulated 9/21, and extubated to HFNC 9/22.  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   - 9/20 BAL culture: normal respiratory selvin  - continue duonebs  - s/p empiric abx   - s/p decannulation on 9/21 with plan for suture removal ~7-10 days   - extubated 9/22 to HFNC   - weaned from HFNC to 6L nasal cannula,  placed back on HFNC 50%/40L for increased WOB and po2 60's,  will continue to titrate down O2 as tolerated  - POCUS exam showed small b/l consolidation, and atelectasis. Will encourage use of incentive spirometer     CARDIOVASCULAR  Hx of Afib w at OSH, started on Amiodarone gtt now in shock state requiring requiring pressors likely septic with component of vaspoplegia i/s/o sedation, possible contribution of cardiogenic from RV dysfunction. Now having episodes of SVT responsive to lopressor   - No PE seen on invasive pulmonary angiography at time of ECMO cannulation or in recent imaging  - NO2 weaned off  (9/15) RV function remains unchanged  - s/p milrinone, off since (9/13)  - converted to sinus (9/14) discontinued amio gtt iso bradycardia, back to AF with RVR on 9/19 when sedation weaned off  - continue 25mg metoprolol BID for rate control   - BP labile, has received hydralazine IVP for hypertensive episodes w/ anxiety likely a contributing factor, however is also requiring low dose Phenylephrine  for intermittent episodes of hypotension that occurs mostly while asleep.  - phenylephrine off since (9/25), continue midodrine 20mg TID    - RITCHIE 9/14 : No MEREDITH thrombus noted, negative for PFO. LVOT diameter of 2 cm  - TTE 9/12 with EF 65-70% with normal LV and RV  - TTE 9/14 with  EF 18%. Severe global LV systolic dysfunction. RV enlargement with decreased RV systolic function, however RITCHIE and recent POCUS shows normal LV systolic function, improved RV systolic function   - TTE 9/19 with recovered EF to 67% but still with RV enlargement and systolic dysfunction    GASTROINTESTINAL  Transaminase elevation likely 2/2 combination of shock liver, malposition of ECMO cannula and liver dysfunction  - ALK/ AST/ALT downtrending but remain elevated 490/136/225, T.bili peaked 19.3 mostly direct, and now 13.4  - Acute hepatitis panel negative  - RUQ US 9/15 shows fatty infiltration of liver, negative for hepatobiliary pathology, repeat RUQ US performed 9/22 for worsening LFT's and rising bili with findings negative as well  - Hepatology consulted - likely shock liver with expected delay in improvement in bilirubin  - Hypertriglyceridemia 836, hx of fatty liver and propofol gtt, s/p insulin gtt, now improving  - Last BM noted 9/24  - continue bowel regimen senna and miralax  - CT abdomen 9/15 w/o bowel obstruction, noted with colonic diverticulosis mostly in sigmoid, w/o evidence of diverticulitis  - continue PPI iso steroids   - nutrition following  - failed dysphagia screen x2 currently with KFT in place tolerating tube feeds  - will hold off cineesophagogram and reassess if ready for bedside FEESST in a few days     RENAL  sCr baseline 0.78  - Creatinine wnl  - s/p diuresis with lasix, last administered 9/15, fluid removal via hemo concentrator while on ECMO circuit, will assess daily if lasix is needed   - s/p Bumex 1mg IVP given to optimize post extubation   - good UO with primafit  - hypernatremia improved, free H2O discontinued 9/21  - IVF fluids started 9/26 for small IVC w/ negative fluid balance, now d/c'd concern for fluid overload, currently positive +2L/24 hours w/ worsening b-lines  on lung ultrasound      INFECTIOUS DISEASE  Leukocytosis  - Elevated WBC likely iso filgrastim (initiated 9/17 for leukopenia) as well as s/p ECMO decannulation, bandemia present but have now downtrended   - Afebrile, however blood and urine cultures were sent given uptrend of WBC  - UA+ (many bacteria), can start CTX 1gm IVPB daily (9/27-  )  - follow up blood and urine cultures sent 9/27  - previous cultures, BAL, cytopathology so far negative  - leukopenia now resolved s/p filgrastim (9/17-9/21)  - Bactrim DS discontinued iso leukopenia, continue Mepron for PJP prophylaxis instead  - s/p IV Ceftriaxone 5 days? at [facility] out of an abundance of precaution to cover BAL specimen  - s/p zosyn at OSH (9/12) for lingula PNA  - vancomycin (9/12-9/15 ) d/c'd negative mrsa PCR, and azithro (9/14-9/15) d/c'd neg Urine legionella  - s/p empiric donald (9/13-9/20) now that ANC >1.5  - vanco restarted for ppx on 9/18 d/t leukopenia but stopped on 9/20  - positive COVID-19 antigen in late August  - ID following- f/u recs    ENDOCRINE  # Hyperglycemia i/s/o steroids  Admission A1c 6.1  - s/p insulin gtt, transitioned to NPH 11 w/ISS,  NPH was lowered to 5u units, and now increased to 6units w/mod ISS given glucose >200 iso increase TF rate and hyperglycemia  - continue to adjust insulin and ISS as steroids are tapered and diet changes  - elevated triglycerides 835, has hx of fatty liver, had been on propofol gtt. TG downtrending since initiating insulin gtt for hyperglycemia, now normalizing off propofol  - TSH low initially at 0.13 repeat normal     HEME  #Right IJ thrombus   -s/p argatroban gtt , transitioned to lovenox   - Lowered lovenox dose 120mg BID to 100mg BID based on elevated Anti Xa level (1.14), will need redraw Anti Xa Friday 9/29/23, four hours after 4th dose lovenox 100mg adminstered  - INR elevated likely iso argatroban and liver dysfxn, s/p Vit. K (9/14) and FFP x1 (9/15) - now resolved  - platelets x 7 for thrombocytopenia <50,000, last transfused  9/21 continues to improve  - Intermittent episodes of epistaxis, no overt bleeding noted. Hgb remains stable   - Heme following- f/u recs    #Leukopenia  #Thrombocytopenia  - Heme consult placed for thrombocytopenia, noted to also be leukopenic but now resolved   - unlikely HLH/MAS since many abnormalities can be explained by severe hypoxemia/hypotension during initial decompensation prior to ecmo cannulation but some features that are consistent and with rheumatic disease it is a possibility to f/u hematology regarding utility of further lab/pathologic testing  - peripheral blood smear reviewed: large platelets noted, no platelet clumps, no blast cells noted, neutrophils noted w/ normal number of lobes, nucleated RBC noted  - acute hepatitis panel negative  - s/p filgrastim 480 daily  given ANC >1500 in the setting of possible infxn   - Thrombocytopenia refractory after intermittent transfusions will monitor now off circuit, platelet count continues to improve    #R/O DVT  - LE duplex negative for DVT   - first VA duplex post decannulation 9/21 shows non-occlusive deep vein thrombosis in the right jugular vein and the right cephalic is non-compressible  - c/w AC      MSK  Onset of debilitating b/l hand cramps and some muscles weakness x several months, unclear etiology, radiculopathy? vs myositis?   - MRI outpatient reportedly showed cervical spine issues, started on Prednisone shortly before admission at OSH  - myomarker panel + for RNP and Ku  - seen by neurologist at [facility]   - symptoms improved with cellcept (9/8-9/11) but discontinued given Afib RVR   - consider restarting home Gabapentin to assist with neuropathic pain? once more alert and awake  - f/u with Dr. Nik Marie or Dante Urbano for EMG upon discharge (osh?)      RHEUM  - started on Solumedrol 60mg q6h from 9/1 to 9/6 then weaned over time to then Medrol 32 mg 9/9 to 9/12 at H  - s/p Cellcept 9/8 to 9/11 but stopped due to Afib- RVR/tachycardia   - CT findings, Improved/decreased extent of bilateral ground glass opacities and reticular markings compared to the previous study. Findings are nonspecific but differential etiologies include interstitial pneumonia, drug toxicity, nonspecific connective tissue disorders.  - OSH labs show strongly positive ARIANA, RNP, and anti smith antibodies with low C4 and normal C3. 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

## 2023-09-27 NOTE — PROGRESS NOTE ADULT - ASSESSMENT
61 yo F w/ Afib initially presented to urgent care for SOB where she had an XR done concerning for b/l lower infiltrates, sent to [facility] ED and  admitted to [facility] on 8/26 after being found to be hypoxemic, reported having  debilitating b/l hand numbness/tingling and some muscles weakness several months. Found to be hypoxic and have interstitial lung disease appearance on CT, admitted 8/26 for AHRF, further ILD workup and management,  started on prednisone on admission with gradually improving O2 requirement and has been stable on 2-3LNC since 9/3, underwent bronchoscopy with TBBX on 8/30 which showed Non-Specific Intersitial Disease (NSIP)/OP. Labs notable for positive ARIANA 1:1280, RNP > 8, Alejandro > 8. Patient continued on steroids and recieved cellcept, which was discontinued 2/2 Afib-RVR. Interval CTA chest on 9/11 also negative PE did show possible lingula pneumonia.  Started on empiric vancomycin and zosyn 9/12, course was then c/b episode of SVT to 170s w/ rapidly progressive hypoxemic respiratory failure, placed NRB offered HFNC/BiPAP but refused, leading to worsening hypoxemic respiratory failure requiring intubation  9/13. Despite best practices, patient remained hypoxemic and patient was cannulated for VV-ECMO on 9/13 and transferred to [facility]. Oncology consulted for leukopenia    #leukopenia, thrombocytopenia-> resolved  - new acute leukopenia of unclear etiology (partner mentioned history of leukopenia which she saw hematology for in the past w/ negative workup, however, no records were found), possibly in the setting of autoimmune dz vs medication induced (meropenem? bactrim? 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) w/ plasmapheresis (planned for every other day starting 9/15- ) and rituximab (9/16x1)  - thrombocytopenia likely in the setting of shock liver w/ pre-existing fatty liver and on ECMO  - would give filgrastim 480 daily until ANC >1500 in the setting of possible infxn  - acute hepatitis panel negative,  HIV negative  -  B12, folate-> normal , TSH-> normal  -  give cryoprecipitate to maintain fibrinogen > 150  - peripheral blood smear reviewed: large platelets noted, no platelet clumps, no blast cells noted, neutrophils noted w/ normal number of lobes, nucleated RBC noted  - pt now has leukocytosis likely reactive in the setting of infection and mixed connective  63 yo F w/ Afib initially presented to urgent care for SOB where she had an XR done concerning for b/l lower infiltrates, sent to [facility] ED and  admitted to [facility] on 8/26 after being found to be hypoxemic, reported having  debilitating b/l hand numbness/tingling and some muscles weakness several months. Found to be hypoxic and have interstitial lung disease appearance on CT, admitted 8/26 for AHRF, further ILD workup and management,  started on prednisone on admission with gradually improving O2 requirement and has been stable on 2-3LNC since 9/3, underwent bronchoscopy with TBBX on 8/30 which showed Non-Specific Intersitial Disease (NSIP)/OP. Labs notable for positive ARIANA 1:1280, RNP > 8, Alejandro > 8. Patient continued on steroids and recieved cellcept, which was discontinued 2/2 Afib-RVR. Interval CTA chest on 9/11 also negative PE did show possible lingula pneumonia.  Started on empiric vancomycin and zosyn 9/12, course was then c/b episode of SVT to 170s w/ rapidly progressive hypoxemic respiratory failure, placed NRB offered HFNC/BiPAP but refused, leading to worsening hypoxemic respiratory failure requiring intubation  9/13. Despite best practices, patient remained hypoxemic and patient was cannulated for VV-ECMO on 9/13 and transferred to [facility]. Oncology consulted for leukopenia    #leukopenia, thrombocytopenia-> resolved  - new acute leukopenia of unclear etiology (partner mentioned history of leukopenia which she saw hematology for in the past w/ negative workup, however, no records were found), possibly in the setting of autoimmune dz vs medication induced (meropenem? bactrim? 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) w/ plasmapheresis (planned for every other day starting 9/15- ) and rituximab (9/16x1)  - thrombocytopenia likely in the setting of shock liver w/ pre-existing fatty liver and on ECMO  - would give filgrastim 480 daily until ANC >1500 in the setting of possible infxn  - acute hepatitis panel negative,  HIV negative  -  B12, folate-> normal , TSH-> normal  -  give cryoprecipitate to maintain fibrinogen > 150  - peripheral blood smear reviewed: large platelets noted, no platelet clumps, no blast cells noted, neutrophils noted w/ normal number of lobes, nucleated RBC noted  - pt now has leukocytosis likely reactive in the setting of infection and mixed connective     # DVT  - LE duplex negative for DVT   - first VA duplex post decannulation 9/21 shows non-occlusive deep vein thrombosis in the right jugular vein and the right cephalic is non-compressible  - patient on lovenox 120 mg q12-> has epistaxis; can check anti Xa level 4 hr post lovenox to see if pt is in therapeutic range.

## 2023-09-27 NOTE — PROGRESS NOTE ADULT - SUBJECTIVE AND OBJECTIVE BOX
Interval Events:  -No acute events overnight, remained on HFNC 40/40L    HPI:  62 year old female with a past medical history of afib, and sciatica, who presented to [facility] for shortness of breath. She had gone to [facility] where she had CXR which showed bilateral lower lobe infiltrates so was sent to ER. She had been having exertional dyspnea and hypoxemia (on home pulse ox to 82%). She had been having dyspnea for several months and had a workup in [place] with a CT scan (which was negative for PE). She also states that she was seen by a pulmonologist and rheumatology, where they ruled out lupus and other immunological disorders.    [facility] Hospital Course  Found to be hypoxic and have interstitial lung disease appearance on CT, admitted 8/26 for AHRF, further ILD workup and management. TTE 8/26 with normal LVEF. Pt was started on prednisone on admission with gradually improving O2 requirement and has been stable on 2-3LNC since 9/3. Started steroid taper on 9/6 and fully transitioned to PO 9/8 overnight. Started on Mycophenolate mofetil (cellcept) 9/8 evening but pt reported subjective side effects with weakness. Episode of AF w RVR with HR up to 140s on 9/11 am, decreased to HR 10-110s with IV lopressor 10. CTA negative for PE and showed interval improvements in GGO but possible lingular bleb and RML bronchiectasis. Patient received 2L of but before more fluids and beta-blocker pt developed heart palpitations with EKG HR 170s with SVT. BPs 105/70s. Did not respond to vagal maneuvers. Pt given oral lopressor 12.5 and IV lopressor 5, with improvement of HR to 130s. SBP briefly dropped to 60-70s then recovered. Patient on NRB offered HFNC/BiPAP but refused. Started on empiric vancomycin and zosyn. BCx w/ NGTD. Per pulm increased solumedrol to 40mg qd. Patient acceded to HFNC in which she desatted and presented with increased wob for which she was transitioned to BiPAP. Patient with anxiety while on BiPAP presenting tachypnea and desatting to the 80s despite verbal redirection for which she was administered ativan 1mg IVP x1. Patient distress improved with sats in the low 90s but had desaturation to 70s. STAT CXR showed increased pulmonary congestion for which patient was administered lasix without improvement. Intubated and started on mechanical ventilation but required very high PEEP (18) and 100% FiO2 and still had low saturations. VV ECMO team consulted and accepted to [facility]. Per signout patient had RV enlargement and dysfunction on POCUS with concern for either new PE vs high pulmonary pressures due to PEEP 18 and lung dysfunction. Patient went for cannulation at [facility] with flouro showing no acute PE. Cannulated with 25 F drainage cannula in R Fem V and 23F “arterial” cannula in RIJ.    (13 Sep 2023 15:24)      REVIEW OF SYSTEMS:  [x] All other systems negative  [ ] Unable to assess ROS because ________    OBJECTIVE:  ICU Vital Signs Last 24 Hrs  T(C): 36.8 (27 Sep 2023 00:00), Max: 37.1 (26 Sep 2023 08:00)  T(F): 98.2 (27 Sep 2023 00:00), Max: 98.8 (26 Sep 2023 08:00)  HR: 84 (27 Sep 2023 05:00) (71 - 88)  BP: --  BP(mean): --  ABP: 114/61 (27 Sep 2023 05:00) (99/55 - 123/67)  ABP(mean): 82 (27 Sep 2023 05:00) (71 - 90)  RR: 33 (27 Sep 2023 05:00) (18 - 36)  SpO2: 100% (27 Sep 2023 05:00) (92% - 100%)    O2 Parameters below as of 27 Sep 2023 06:00  Patient On (Oxygen Delivery Method): nasal cannula, high flow  O2 Flow (L/min): 40  O2 Concentration (%): 40          09-25 @ 07:01  -  09-26 @ 07:00  --------------------------------------------------------  IN: 1367.7 mL / OUT: 2000 mL / NET: -632.3 mL    09-26 @ 07:01  -  09-27 @ 06:03  --------------------------------------------------------  IN: 3235 mL / OUT: 700 mL / NET: 2535 mL      CAPILLARY BLOOD GLUCOSE      POCT Blood Glucose.: 137 mg/dL (27 Sep 2023 05:28)      Physical Exam:   Constitutional: ill appearing, no acute distress   HEENT: + PERRLA, EOMI, no drainage or redness  Neck: supple,  No JVD  Respiratory: diminished breath Sounds equal bilaterally to auscultation, no accessory muscle use noted  Cardiovascular: Regular rate, regular rhythm, normal S1, S2; no murmurs or rub  Gastrointestinal: Soft, non-tender, distended, no hepatosplenomegaly, normal bowel sounds  Extremities: + 2 peripheral edema, no cyanosis, no clubbing   Vascular: Equal and normal pulses: 2+ peripheral pulses throughout  Neurological: alert. delirious at times  Psychiatric: calm, normal mood, normal affect  Musculoskeletal: No joint swelling or deformity; no limitation of movement, weakness noted upper and lower ext.  Skin: warm, dry, well perfused, no rashes, right IJ and right fem sutures in place post ecmo      HOSPITAL MEDICATIONS:  Standing Meds:  albuterol/ipratropium for Nebulization 3 milliLiter(s) Nebulizer every 6 hours  artificial  tears Solution 1 Drop(s) Both EYES every 12 hours  atovaquone  Suspension 1500 milliGRAM(s) Oral daily  chlorhexidine 2% Cloths 1 Application(s) Topical <User Schedule>  dextrose 5%. 1000 milliLiter(s) IV Continuous <Continuous>  dextrose 50% Injectable 12.5 Gram(s) IV Push once  dextrose 50% Injectable 25 Gram(s) IV Push once  dextrose 50% Injectable 25 Gram(s) IV Push once  enoxaparin Injectable 120 milliGRAM(s) SubCutaneous every 12 hours  folic acid 1 milliGRAM(s) Oral daily  glucagon  Injectable 1 milliGRAM(s) IntraMuscular once  insulin lispro (ADMELOG) corrective regimen sliding scale   SubCutaneous every 6 hours  insulin NPH human recombinant 5 Unit(s) SubCutaneous every 6 hours  methylPREDNISolone sodium succinate Injectable 80 milliGRAM(s) IV Push daily  metoprolol tartrate 25 milliGRAM(s) Oral two times a day  midodrine 20 milliGRAM(s) Oral every 8 hours  multivitamin/minerals/iron Oral Solution (CENTRUM) 15 milliLiter(s) Oral daily  pantoprazole  Injectable 40 milliGRAM(s) IV Push daily  potassium phosphate IVPB 15 milliMole(s) IV Intermittent once  senna Syrup 10 milliLiter(s) Oral at bedtime  sodium chloride 0.45%. 1000 milliLiter(s) IV Continuous <Continuous>      PRN Meds:  dextrose Oral Gel 15 Gram(s) Oral once PRN      LABS:                        10.4   19.87 )-----------( 206      ( 27 Sep 2023 02:58 )             32.1     Hgb Trend: 10.4<--, 10.4<--, 10.4<--, 11.7<--, 12.0<--  09-27    140  |  102  |  30<H>  ----------------------------<  131<H>  3.9   |  26  |  0.43<L>    Ca    9.4      27 Sep 2023 02:58  Phos  2.4     09-27  Mg     2.30     09-26    TPro  4.7<L>  /  Alb  3.0<L>  /  TBili  13.4<H>  /  DBili  x   /  AST  151<H>  /  ALT  230<H>  /  AlkPhos  644<H>  09-27    Creatinine Trend: 0.43<--, 0.49<--, 0.50<--, 0.54<--, 0.51<--, 0.48<--  PT/INR - ( 27 Sep 2023 02:58 )   PT: 12.9 sec;   INR: 1.15 ratio         PTT - ( 27 Sep 2023 02:58 )  PTT:37.6 sec  Urinalysis Basic - ( 27 Sep 2023 02:58 )    Color: x / Appearance: x / SG: x / pH: x  Gluc: 131 mg/dL / Ketone: x  / Bili: x / Urobili: x   Blood: x / Protein: x / Nitrite: x   Leuk Esterase: x / RBC: x / WBC x   Sq Epi: x / Non Sq Epi: x / Bacteria: x      Arterial Blood Gas:  09-27 @ 02:58  7.47/40/84/29/96.6/5.0  ABG lactate: --  Arterial Blood Gas:  09-26 @ 16:56  7.49/36/94/27/97.8/4.0  ABG lactate: --  Arterial Blood Gas:  09-26 @ 03:55  7.51/36/98/29/98.5/5.5  ABG lactate: --  Arterial Blood Gas:  09-25 @ 17:30  7.47/39/62/28/91.2/4.4  ABG lactate: --        MICROBIOLOGY:     RADIOLOGY:  [ ] Reviewed and interpreted by me         Interval Events:  -No acute events overnight, remained on HFNC 40/40L    HPI:  62 year old female with a past medical history of afib, and sciatica, who presented to [facility] for shortness of breath. She had gone to [facility] where she had CXR which showed bilateral lower lobe infiltrates so was sent to ER. She had been having exertional dyspnea and hypoxemia (on home pulse ox to 82%). She had been having dyspnea for several months and had a workup in [place] with a CT scan (which was negative for PE). She also states that she was seen by a pulmonologist and rheumatology, where they ruled out lupus and other immunological disorders.    [facility] Hospital Course  Found to be hypoxic and have interstitial lung disease appearance on CT, admitted 8/26 for AHRF, further ILD workup and management. TTE 8/26 with normal LVEF. Pt was started on prednisone on admission with gradually improving O2 requirement and has been stable on 2-3LNC since 9/3. Started steroid taper on 9/6 and fully transitioned to PO 9/8 overnight. Started on Mycophenolate mofetil (cellcept) 9/8 evening but pt reported subjective side effects with weakness. Episode of AF w RVR with HR up to 140s on 9/11 am, decreased to HR 10-110s with IV lopressor 10. CTA negative for PE and showed interval improvements in GGO but possible lingular bleb and RML bronchiectasis. Patient received 2L of but before more fluids and beta-blocker pt developed heart palpitations with EKG HR 170s with SVT. BPs 105/70s. Did not respond to vagal maneuvers. Pt given oral lopressor 12.5 and IV lopressor 5, with improvement of HR to 130s. SBP briefly dropped to 60-70s then recovered. Patient on NRB offered HFNC/BiPAP but refused. Started on empiric vancomycin and zosyn. BCx w/ NGTD. Per pulm increased solumedrol to 40mg qd. Patient acceded to HFNC in which she desatted and presented with increased wob for which she was transitioned to BiPAP. Patient with anxiety while on BiPAP presenting tachypnea and desatting to the 80s despite verbal redirection for which she was administered ativan 1mg IVP x1. Patient distress improved with sats in the low 90s but had desaturation to 70s. STAT CXR showed increased pulmonary congestion for which patient was administered lasix without improvement. Intubated and started on mechanical ventilation but required very high PEEP (18) and 100% FiO2 and still had low saturations. VV ECMO team consulted and accepted to [facility] center. Per signout patient had RV enlargement and dysfunction on POCUS with concern for either new PE vs high pulmonary pressures due to PEEP 18 and lung dysfunction. Patient went for cannulation at [facility] with flouro showing no acute PE. Cannulated with 25 F drainage cannula in R Fem V and 23F “arterial” cannula in RIJ.    (13 Sep 2023 15:24)      REVIEW OF SYSTEMS:  [x] A ten-point review of systems was otherwise negative except as noted.  [ ] Due to altered mental status/intubation, subjective information were not able to be obtained from the patient. History was obtained, to the extent possible, from review of the chart and collateral sources of information.        OBJECTIVE:  ICU Vital Signs Last 24 Hrs  T(C): 36.8 (27 Sep 2023 00:00), Max: 37.1 (26 Sep 2023 08:00)  T(F): 98.2 (27 Sep 2023 00:00), Max: 98.8 (26 Sep 2023 08:00)  HR: 84 (27 Sep 2023 05:00) (71 - 88)  BP: --  BP(mean): --  ABP: 114/61 (27 Sep 2023 05:00) (99/55 - 123/67)  ABP(mean): 82 (27 Sep 2023 05:00) (71 - 90)  RR: 33 (27 Sep 2023 05:00) (18 - 36)  SpO2: 100% (27 Sep 2023 05:00) (92% - 100%)    O2 Parameters below as of 27 Sep 2023 06:00  Patient On (Oxygen Delivery Method): nasal cannula, high flow  O2 Flow (L/min): 40  O2 Concentration (%): 40          09-25 @ 07:01  -  09-26 @ 07:00  --------------------------------------------------------  IN: 1367.7 mL / OUT: 2000 mL / NET: -632.3 mL    09-26 @ 07:01  -  09-27 @ 06:03  --------------------------------------------------------  IN: 3235 mL / OUT: 700 mL / NET: 2535 mL      CAPILLARY BLOOD GLUCOSE      POCT Blood Glucose.: 137 mg/dL (27 Sep 2023 05:28)      Physical Exam:   Constitutional: ill appearing, no acute distress   HEENT: + PERRLA, EOMI, no drainage or redness  Neck: supple,  No JVD  Respiratory: diminished breath Sounds equal bilaterally to auscultation, no accessory muscle use noted  Cardiovascular: Regular rate, regular rhythm, normal S1, S2; no murmurs or rub  Gastrointestinal: Soft, non-tender, distended, no hepatosplenomegaly, normal bowel sounds  Extremities: + 2 peripheral edema, no cyanosis, no clubbing   Vascular: Equal and normal pulses: 2+ peripheral pulses throughout  Neurological: alert. awake, hypophonic  Psychiatric: calm, normal mood, normal affect  Musculoskeletal: No joint swelling or deformity; no limitation of movement, weakness noted upper and lower ext.  Skin: warm, dry, well perfused, no rashes, right IJ and right fem sutures in place post ecmo      HOSPITAL MEDICATIONS:  Standing Meds:  albuterol/ipratropium for Nebulization 3 milliLiter(s) Nebulizer every 6 hours  artificial  tears Solution 1 Drop(s) Both EYES every 12 hours  atovaquone  Suspension 1500 milliGRAM(s) Oral daily  chlorhexidine 2% Cloths 1 Application(s) Topical <User Schedule>  dextrose 5%. 1000 milliLiter(s) IV Continuous <Continuous>  dextrose 50% Injectable 12.5 Gram(s) IV Push once  dextrose 50% Injectable 25 Gram(s) IV Push once  dextrose 50% Injectable 25 Gram(s) IV Push once  enoxaparin Injectable 120 milliGRAM(s) SubCutaneous every 12 hours  folic acid 1 milliGRAM(s) Oral daily  glucagon  Injectable 1 milliGRAM(s) IntraMuscular once  insulin lispro (ADMELOG) corrective regimen sliding scale   SubCutaneous every 6 hours  insulin NPH human recombinant 5 Unit(s) SubCutaneous every 6 hours  methylPREDNISolone sodium succinate Injectable 80 milliGRAM(s) IV Push daily  metoprolol tartrate 25 milliGRAM(s) Oral two times a day  midodrine 20 milliGRAM(s) Oral every 8 hours  multivitamin/minerals/iron Oral Solution (CENTRUM) 15 milliLiter(s) Oral daily  pantoprazole  Injectable 40 milliGRAM(s) IV Push daily  potassium phosphate IVPB 15 milliMole(s) IV Intermittent once  senna Syrup 10 milliLiter(s) Oral at bedtime  sodium chloride 0.45%. 1000 milliLiter(s) IV Continuous <Continuous>      PRN Meds:  dextrose Oral Gel 15 Gram(s) Oral once PRN      LABS:                        10.4   19.87 )-----------( 206      ( 27 Sep 2023 02:58 )             32.1     Hgb Trend: 10.4<--, 10.4<--, 10.4<--, 11.7<--, 12.0<--  09-27    140  |  102  |  30<H>  ----------------------------<  131<H>  3.9   |  26  |  0.43<L>    Ca    9.4      27 Sep 2023 02:58  Phos  2.4     09-27  Mg     2.30     09-26    TPro  4.7<L>  /  Alb  3.0<L>  /  TBili  13.4<H>  /  DBili  x   /  AST  151<H>  /  ALT  230<H>  /  AlkPhos  644<H>  09-27    Creatinine Trend: 0.43<--, 0.49<--, 0.50<--, 0.54<--, 0.51<--, 0.48<--  PT/INR - ( 27 Sep 2023 02:58 )   PT: 12.9 sec;   INR: 1.15 ratio         PTT - ( 27 Sep 2023 02:58 )  PTT:37.6 sec  Urinalysis Basic - ( 27 Sep 2023 02:58 )    Color: x / Appearance: x / SG: x / pH: x  Gluc: 131 mg/dL / Ketone: x  / Bili: x / Urobili: x   Blood: x / Protein: x / Nitrite: x   Leuk Esterase: x / RBC: x / WBC x   Sq Epi: x / Non Sq Epi: x / Bacteria: x      Arterial Blood Gas:  09-27 @ 02:58  7.47/40/84/29/96.6/5.0  ABG lactate: --  Arterial Blood Gas:  09-26 @ 16:56  7.49/36/94/27/97.8/4.0  ABG lactate: --  Arterial Blood Gas:  09-26 @ 03:55  7.51/36/98/29/98.5/5.5  ABG lactate: --  Arterial Blood Gas:  09-25 @ 17:30  7.47/39/62/28/91.2/4.4  ABG lactate: --        MICROBIOLOGY:     RADIOLOGY:  [ ] Reviewed and interpreted by me

## 2023-09-27 NOTE — CONSULT NOTE ADULT - PROBLEM SELECTOR RECOMMENDATION 9
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   - Avoid strenuous activities  - Strict blood pressure control.  - Avoid nasal trauma; no nose rubbing, blowing or manipulating of the nose/nasal packing. Please sneeze with mouth open and pinching nares.  - Avoid bending with head blow the waist.    - No heavy lifting.  - Do not blow nose  - Hydration  - Rest of care per primary team/MICU  - Given no acute ENT intervention needed and patient is not actively bleeding, ENT sign off at this time  - Please page ENT with any further questions  - Case discussed with Dr. 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   - Avoid strenuous activities  - Strict blood pressure control.  - Avoid nasal trauma; no nose rubbing, blowing or manipulating of the nose/nasal packing. Please sneeze with mouth open and pinching nares.  - Avoid bending with head blow the waist.    - No heavy lifting.  - Do not blow nose  - Hydration  - Rest of care per primary team/MICU  - Given no acute ENT intervention needed and patient is not actively bleeding, ENT sign off at this time  - Please page ENT with any further questions  - Case discussed with Dr. Garrido

## 2023-09-27 NOTE — PROGRESS NOTE ADULT - SUBJECTIVE AND OBJECTIVE BOX
INTERVAL HPI/OVERNIGHT EVENTS:  Patient S&E at bedside. No o/n events,     VITAL SIGNS:  T(F): 98.7 (09-27-23 @ 08:00)  HR: 79 (09-27-23 @ 11:00)  BP: --  RR: 30 (09-27-23 @ 11:00)  SpO2: 92% (09-27-23 @ 11:00)  Wt(kg): --    PHYSICAL EXAM:    Constitutional: NAD  Eyes: EOMI, sclera non-icteric  Neck: supple  Respiratory: CTAB, no wheezes or crackles   Cardiovascular: RRR  Gastrointestinal: soft, NTND, + BS  Extremities: no cyanosis, clubbing or edema   Neurological: awake and alert      MEDICATIONS  (STANDING):  albuterol/ipratropium for Nebulization 3 milliLiter(s) Nebulizer every 6 hours  artificial  tears Solution 1 Drop(s) Both EYES every 12 hours  atovaquone  Suspension 1500 milliGRAM(s) Oral daily  chlorhexidine 2% Cloths 1 Application(s) Topical <User Schedule>  dextrose 5%. 1000 milliLiter(s) (50 mL/Hr) IV Continuous <Continuous>  dextrose 50% Injectable 25 Gram(s) IV Push once  dextrose 50% Injectable 12.5 Gram(s) IV Push once  dextrose 50% Injectable 25 Gram(s) IV Push once  enoxaparin Injectable 120 milliGRAM(s) SubCutaneous every 12 hours  folic acid 1 milliGRAM(s) Oral daily  glucagon  Injectable 1 milliGRAM(s) IntraMuscular once  insulin lispro (ADMELOG) corrective regimen sliding scale   SubCutaneous every 6 hours  insulin NPH human recombinant 5 Unit(s) SubCutaneous every 6 hours  methylPREDNISolone sodium succinate Injectable 80 milliGRAM(s) IV Push daily  metoprolol tartrate 25 milliGRAM(s) Oral two times a day  midodrine 20 milliGRAM(s) Oral every 8 hours  multivitamin/minerals/iron Oral Solution (CENTRUM) 15 milliLiter(s) Oral daily  pantoprazole  Injectable 40 milliGRAM(s) IV Push daily  psyllium Powder 1 Packet(s) Oral daily  senna Syrup 10 milliLiter(s) Oral at bedtime  sodium chloride 0.45%. 1000 milliLiter(s) (75 mL/Hr) IV Continuous <Continuous>    MEDICATIONS  (PRN):  dextrose Oral Gel 15 Gram(s) Oral once PRN Blood Glucose LESS THAN 70 milliGRAM(s)/deciliter      Allergies    No Known Allergies    Intolerances        LABS:                        10.4   19.87 )-----------( 206      ( 27 Sep 2023 02:58 )             32.1     09-27    140  |  102  |  30<H>  ----------------------------<  131<H>  3.9   |  26  |  0.43<L>    Ca    9.4      27 Sep 2023 02:58  Phos  2.4     09-27  Mg     2.30     09-26    TPro  4.7<L>  /  Alb  3.0<L>  /  TBili  13.4<H>  /  DBili  x   /  AST  151<H>  /  ALT  230<H>  /  AlkPhos  644<H>  09-27    PT/INR - ( 27 Sep 2023 02:58 )   PT: 12.9 sec;   INR: 1.15 ratio         PTT - ( 27 Sep 2023 02:58 )  PTT:37.6 sec  Urinalysis Basic - ( 27 Sep 2023 02:58 )    Color: x / Appearance: x / SG: x / pH: x  Gluc: 131 mg/dL / Ketone: x  / Bili: x / Urobili: x   Blood: x / Protein: x / Nitrite: x   Leuk Esterase: x / RBC: x / WBC x   Sq Epi: x / Non Sq Epi: x / Bacteria: x        RADIOLOGY & ADDITIONAL TESTS:  Studies reviewed.   INTERVAL HPI/OVERNIGHT EVENTS:  Patient S&E at bedside. No o/n events. Patient is off ECMO now and alert. Clinically improving    VITAL SIGNS:  T(F): 98.7 (09-27-23 @ 08:00)  HR: 79 (09-27-23 @ 11:00)  BP: --  RR: 30 (09-27-23 @ 11:00)  SpO2: 92% (09-27-23 @ 11:00)  Wt(kg): --    PHYSICAL EXAM:    Constitutional: NAD, on supplemental O2 via mask  Eyes: EOMI, sclera non-icteric  Neck: supple  Respiratory: CTAB, no wheezes or crackles   Cardiovascular: RRR  Gastrointestinal: soft, NTND, + BS  Extremities: no cyanosis, clubbing or edema   Neurological: awake and alert      MEDICATIONS  (STANDING):  albuterol/ipratropium for Nebulization 3 milliLiter(s) Nebulizer every 6 hours  artificial  tears Solution 1 Drop(s) Both EYES every 12 hours  atovaquone  Suspension 1500 milliGRAM(s) Oral daily  chlorhexidine 2% Cloths 1 Application(s) Topical <User Schedule>  dextrose 5%. 1000 milliLiter(s) (50 mL/Hr) IV Continuous <Continuous>  dextrose 50% Injectable 25 Gram(s) IV Push once  dextrose 50% Injectable 12.5 Gram(s) IV Push once  dextrose 50% Injectable 25 Gram(s) IV Push once  enoxaparin Injectable 120 milliGRAM(s) SubCutaneous every 12 hours  folic acid 1 milliGRAM(s) Oral daily  glucagon  Injectable 1 milliGRAM(s) IntraMuscular once  insulin lispro (ADMELOG) corrective regimen sliding scale   SubCutaneous every 6 hours  insulin NPH human recombinant 5 Unit(s) SubCutaneous every 6 hours  methylPREDNISolone sodium succinate Injectable 80 milliGRAM(s) IV Push daily  metoprolol tartrate 25 milliGRAM(s) Oral two times a day  midodrine 20 milliGRAM(s) Oral every 8 hours  multivitamin/minerals/iron Oral Solution (CENTRUM) 15 milliLiter(s) Oral daily  pantoprazole  Injectable 40 milliGRAM(s) IV Push daily  psyllium Powder 1 Packet(s) Oral daily  senna Syrup 10 milliLiter(s) Oral at bedtime  sodium chloride 0.45%. 1000 milliLiter(s) (75 mL/Hr) IV Continuous <Continuous>    MEDICATIONS  (PRN):  dextrose Oral Gel 15 Gram(s) Oral once PRN Blood Glucose LESS THAN 70 milliGRAM(s)/deciliter      Allergies    No Known Allergies    Intolerances        LABS:                        10.4   19.87 )-----------( 206      ( 27 Sep 2023 02:58 )             32.1     09-27    140  |  102  |  30<H>  ----------------------------<  131<H>  3.9   |  26  |  0.43<L>    Ca    9.4      27 Sep 2023 02:58  Phos  2.4     09-27  Mg     2.30     09-26    TPro  4.7<L>  /  Alb  3.0<L>  /  TBili  13.4<H>  /  DBili  x   /  AST  151<H>  /  ALT  230<H>  /  AlkPhos  644<H>  09-27    PT/INR - ( 27 Sep 2023 02:58 )   PT: 12.9 sec;   INR: 1.15 ratio         PTT - ( 27 Sep 2023 02:58 )  PTT:37.6 sec  Urinalysis Basic - ( 27 Sep 2023 02:58 )    Color: x / Appearance: x / SG: x / pH: x  Gluc: 131 mg/dL / Ketone: x  / Bili: x / Urobili: x   Blood: x / Protein: x / Nitrite: x   Leuk Esterase: x / RBC: x / WBC x   Sq Epi: x / Non Sq Epi: x / Bacteria: x        RADIOLOGY & ADDITIONAL TESTS:  Studies reviewed.

## 2023-09-27 NOTE — PROGRESS NOTE ADULT - ATTENDING COMMENTS
This patient was seen in follow-up by the hematology service.  She was seen last week for neutropenia and thrombocytopenia in the setting of multiple medical problems including sepsis, mixed connective tissue disease, respiratory failure with the need for ECMO.  Her counts have returned to normal at this time.  She has pre-existing fatty liver and had shock liver as well.  She did receive some for some filgrastim as she was infected.  She had a nosebleed this morning.  She is obese and she is on weight dosed Lovenox.  We recommend that an antifactor 10 A level be obtained 4 hours after the Lovenox injection to the chest for the possibility of a supratherapeutic level.  There are no clear recommendations as to dosing in patients that way more than 100 kg.  I agree with the assessment and plan as stated above in the note from Dr. Stanley, hematology fellow.  Ezequiel Remy MD  Hematology attending

## 2023-09-28 LAB
ALBUMIN SERPL ELPH-MCNC: 2.9 G/DL — LOW (ref 3.3–5)
ALP SERPL-CCNC: 688 U/L — HIGH (ref 40–120)
ALT FLD-CCNC: 227 U/L — HIGH (ref 4–33)
ANION GAP SERPL CALC-SCNC: 11 MMOL/L — SIGNIFICANT CHANGE UP (ref 7–14)
ANISOCYTOSIS BLD QL: SLIGHT — SIGNIFICANT CHANGE UP
APTT BLD: 36.6 SEC — HIGH (ref 24.5–35.6)
AST SERPL-CCNC: 166 U/L — HIGH (ref 4–32)
BASE EXCESS BLDV CALC-SCNC: 9.6 MMOL/L — HIGH (ref -2–3)
BASOPHILS # BLD AUTO: 0 K/UL — SIGNIFICANT CHANGE UP (ref 0–0.2)
BASOPHILS NFR BLD AUTO: 0 % — SIGNIFICANT CHANGE UP (ref 0–2)
BILIRUB SERPL-MCNC: 13.8 MG/DL — HIGH (ref 0.2–1.2)
BLOOD GAS VENOUS COMPREHENSIVE RESULT: SIGNIFICANT CHANGE UP
BUN SERPL-MCNC: 35 MG/DL — HIGH (ref 7–23)
CALCIUM SERPL-MCNC: 9.8 MG/DL — SIGNIFICANT CHANGE UP (ref 8.4–10.5)
CHLORIDE BLDV-SCNC: 102 MMOL/L — SIGNIFICANT CHANGE UP (ref 96–108)
CHLORIDE SERPL-SCNC: 98 MMOL/L — SIGNIFICANT CHANGE UP (ref 98–107)
CO2 BLDV-SCNC: 35.7 MMOL/L — HIGH (ref 22–26)
CO2 SERPL-SCNC: 30 MMOL/L — SIGNIFICANT CHANGE UP (ref 22–31)
CREAT SERPL-MCNC: 0.46 MG/DL — LOW (ref 0.5–1.3)
CULTURE RESULTS: NO GROWTH — SIGNIFICANT CHANGE UP
EGFR: 107 ML/MIN/1.73M2 — SIGNIFICANT CHANGE UP
EJ: NEGATIVE — SIGNIFICANT CHANGE UP
ENA JO1 AB SER IA-ACNC: <20 UNITS — SIGNIFICANT CHANGE UP
ENA PM/SCL AB SER-ACNC: <20 UNITS — SIGNIFICANT CHANGE UP
ENA SM+RNP AB SER IA-ACNC: 140 UNITS — HIGH
ENA SS-A IGG SER QL: <20 UNITS — SIGNIFICANT CHANGE UP
EOSINOPHIL # BLD AUTO: 0 K/UL — SIGNIFICANT CHANGE UP (ref 0–0.5)
EOSINOPHIL NFR BLD AUTO: 0 % — SIGNIFICANT CHANGE UP (ref 0–6)
FIBRILLARIN AB SER QL: NEGATIVE — SIGNIFICANT CHANGE UP
GAS PNL BLDV: 137 MMOL/L — SIGNIFICANT CHANGE UP (ref 136–145)
GIANT PLATELETS BLD QL SMEAR: PRESENT — SIGNIFICANT CHANGE UP
GLUCOSE BLDC GLUCOMTR-MCNC: 109 MG/DL — HIGH (ref 70–99)
GLUCOSE BLDC GLUCOMTR-MCNC: 111 MG/DL — HIGH (ref 70–99)
GLUCOSE BLDC GLUCOMTR-MCNC: 120 MG/DL — HIGH (ref 70–99)
GLUCOSE BLDC GLUCOMTR-MCNC: 133 MG/DL — HIGH (ref 70–99)
GLUCOSE BLDC GLUCOMTR-MCNC: 250 MG/DL — HIGH (ref 70–99)
GLUCOSE BLDV-MCNC: 121 MG/DL — HIGH (ref 70–99)
GLUCOSE SERPL-MCNC: 119 MG/DL — HIGH (ref 70–99)
HCO3 BLDV-SCNC: 34 MMOL/L — HIGH (ref 22–29)
HCT VFR BLD CALC: 32.4 % — LOW (ref 34.5–45)
HCT VFR BLDA CALC: 32 % — LOW (ref 34.5–46.5)
HGB BLD CALC-MCNC: 10.8 G/DL — LOW (ref 11.7–16.1)
HGB BLD-MCNC: 10.6 G/DL — LOW (ref 11.5–15.5)
IANC: 21.9 K/UL — HIGH (ref 1.8–7.4)
INR BLD: 1.14 RATIO — SIGNIFICANT CHANGE UP (ref 0.85–1.18)
KU AB SER QL: ABNORMAL
LACTATE BLDV-MCNC: 1.2 MMOL/L — SIGNIFICANT CHANGE UP (ref 0.5–2)
LYMPHOCYTES # BLD AUTO: 0.47 K/UL — LOW (ref 1–3.3)
LYMPHOCYTES # BLD AUTO: 1.9 % — LOW (ref 13–44)
MACROCYTES BLD QL: SLIGHT — SIGNIFICANT CHANGE UP
MAGNESIUM SERPL-MCNC: 2.1 MG/DL — SIGNIFICANT CHANGE UP (ref 1.6–2.6)
MANUAL SMEAR VERIFICATION: SIGNIFICANT CHANGE UP
MCHC RBC-ENTMCNC: 31.7 PG — SIGNIFICANT CHANGE UP (ref 27–34)
MCHC RBC-ENTMCNC: 32.7 GM/DL — SIGNIFICANT CHANGE UP (ref 32–36)
MCV RBC AUTO: 97 FL — SIGNIFICANT CHANGE UP (ref 80–100)
MDA-5 (P140)(CADM-140): <20 UNITS — SIGNIFICANT CHANGE UP
MI2 AB SER QL: NEGATIVE — SIGNIFICANT CHANGE UP
MONOCYTES # BLD AUTO: 0.91 K/UL — HIGH (ref 0–0.9)
MONOCYTES NFR BLD AUTO: 3.7 % — SIGNIFICANT CHANGE UP (ref 2–14)
MYELOCYTES NFR BLD: 3.7 % — HIGH (ref 0–0)
NEUTROPHILS # BLD AUTO: 22.26 K/UL — HIGH (ref 1.8–7.4)
NEUTROPHILS NFR BLD AUTO: 90.7 % — HIGH (ref 43–77)
NRBC # BLD: 2 /100 — HIGH (ref 0–0)
NXP-2 (P140): <20 UNITS — SIGNIFICANT CHANGE UP
OJ AB SER QL: NEGATIVE — SIGNIFICANT CHANGE UP
OVALOCYTES BLD QL SMEAR: SLIGHT — SIGNIFICANT CHANGE UP
PCO2 BLDV: 46 MMHG — SIGNIFICANT CHANGE UP (ref 39–52)
PH BLDV: 7.48 — HIGH (ref 7.32–7.43)
PHOSPHATE SERPL-MCNC: 2.7 MG/DL — SIGNIFICANT CHANGE UP (ref 2.5–4.5)
PL12 AB SER QL: NEGATIVE — SIGNIFICANT CHANGE UP
PL7 AB SER QL: NEGATIVE — SIGNIFICANT CHANGE UP
PLAT MORPH BLD: NORMAL — SIGNIFICANT CHANGE UP
PLATELET # BLD AUTO: 233 K/UL — SIGNIFICANT CHANGE UP (ref 150–400)
PLATELET COUNT - ESTIMATE: NORMAL — SIGNIFICANT CHANGE UP
PO2 BLDV: 83 MMHG — HIGH (ref 25–45)
POIKILOCYTOSIS BLD QL AUTO: SLIGHT — SIGNIFICANT CHANGE UP
POLYCHROMASIA BLD QL SMEAR: SLIGHT — SIGNIFICANT CHANGE UP
POTASSIUM BLDV-SCNC: 4.1 MMOL/L — SIGNIFICANT CHANGE UP (ref 3.5–5.1)
POTASSIUM SERPL-MCNC: 4.1 MMOL/L — SIGNIFICANT CHANGE UP (ref 3.5–5.3)
POTASSIUM SERPL-SCNC: 4.1 MMOL/L — SIGNIFICANT CHANGE UP (ref 3.5–5.3)
PROT SERPL-MCNC: 4.9 G/DL — LOW (ref 6–8.3)
PROTHROM AB SERPL-ACNC: 12.7 SEC — SIGNIFICANT CHANGE UP (ref 9.5–13)
RBC # BLD: 3.34 M/UL — LOW (ref 3.8–5.2)
RBC # FLD: 23.6 % — HIGH (ref 10.3–14.5)
RBC BLD AUTO: ABNORMAL
SAO2 % BLDV: 97.2 % — HIGH (ref 67–88)
SMUDGE CELLS # BLD: PRESENT — SIGNIFICANT CHANGE UP
SODIUM SERPL-SCNC: 139 MMOL/L — SIGNIFICANT CHANGE UP (ref 135–145)
SPECIMEN SOURCE: SIGNIFICANT CHANGE UP
SRP AB SERPL QL: NEGATIVE — SIGNIFICANT CHANGE UP
TIF GAMMA (P155/140): <20 UNITS — SIGNIFICANT CHANGE UP
U2 SNRNP AB SER QL: ABNORMAL
WBC # BLD: 24.54 K/UL — HIGH (ref 3.8–10.5)
WBC # FLD AUTO: 24.54 K/UL — HIGH (ref 3.8–10.5)

## 2023-09-28 PROCEDURE — 99232 SBSQ HOSP IP/OBS MODERATE 35: CPT | Mod: GC

## 2023-09-28 PROCEDURE — 99233 SBSQ HOSP IP/OBS HIGH 50: CPT

## 2023-09-28 PROCEDURE — 99291 CRITICAL CARE FIRST HOUR: CPT | Mod: GC

## 2023-09-28 RX ORDER — TRANEXAMIC ACID 100 MG/ML
500 INJECTION, SOLUTION INTRAVENOUS EVERY 8 HOURS
Refills: 0 | Status: DISCONTINUED | OUTPATIENT
Start: 2023-09-28 | End: 2023-10-05

## 2023-09-28 RX ADMIN — HUMAN INSULIN 6 UNIT(S): 100 INJECTION, SUSPENSION SUBCUTANEOUS at 18:23

## 2023-09-28 RX ADMIN — Medication 25 MILLIGRAM(S): at 18:23

## 2023-09-28 RX ADMIN — Medication 3 MILLILITER(S): at 09:39

## 2023-09-28 RX ADMIN — MIDODRINE HYDROCHLORIDE 20 MILLIGRAM(S): 2.5 TABLET ORAL at 15:37

## 2023-09-28 RX ADMIN — Medication 3 MILLILITER(S): at 22:44

## 2023-09-28 RX ADMIN — MIDODRINE HYDROCHLORIDE 20 MILLIGRAM(S): 2.5 TABLET ORAL at 21:12

## 2023-09-28 RX ADMIN — MIDODRINE HYDROCHLORIDE 20 MILLIGRAM(S): 2.5 TABLET ORAL at 05:31

## 2023-09-28 RX ADMIN — Medication 15 MILLILITER(S): at 11:06

## 2023-09-28 RX ADMIN — ATOVAQUONE 1500 MILLIGRAM(S): 750 SUSPENSION ORAL at 11:06

## 2023-09-28 RX ADMIN — Medication 3 MILLILITER(S): at 03:06

## 2023-09-28 RX ADMIN — Medication 80 MILLIGRAM(S): at 05:30

## 2023-09-28 RX ADMIN — PANTOPRAZOLE SODIUM 40 MILLIGRAM(S): 20 TABLET, DELAYED RELEASE ORAL at 11:07

## 2023-09-28 RX ADMIN — HUMAN INSULIN 6 UNIT(S): 100 INJECTION, SUSPENSION SUBCUTANEOUS at 05:31

## 2023-09-28 RX ADMIN — Medication 4: at 11:18

## 2023-09-28 RX ADMIN — HUMAN INSULIN 6 UNIT(S): 100 INJECTION, SUSPENSION SUBCUTANEOUS at 11:17

## 2023-09-28 RX ADMIN — Medication 1 MILLIGRAM(S): at 11:06

## 2023-09-28 RX ADMIN — OXYMETAZOLINE HYDROCHLORIDE 1 SPRAY(S): 0.5 SPRAY NASAL at 18:23

## 2023-09-28 RX ADMIN — CEFTRIAXONE 100 MILLIGRAM(S): 500 INJECTION, POWDER, FOR SOLUTION INTRAMUSCULAR; INTRAVENOUS at 15:37

## 2023-09-28 RX ADMIN — ENOXAPARIN SODIUM 100 MILLIGRAM(S): 100 INJECTION SUBCUTANEOUS at 21:12

## 2023-09-28 RX ADMIN — OXYMETAZOLINE HYDROCHLORIDE 1 SPRAY(S): 0.5 SPRAY NASAL at 05:32

## 2023-09-28 RX ADMIN — Medication 1 DROP(S): at 18:23

## 2023-09-28 RX ADMIN — ENOXAPARIN SODIUM 100 MILLIGRAM(S): 100 INJECTION SUBCUTANEOUS at 11:01

## 2023-09-28 RX ADMIN — Medication 1 DROP(S): at 05:31

## 2023-09-28 RX ADMIN — Medication 3 MILLILITER(S): at 16:11

## 2023-09-28 RX ADMIN — CHLORHEXIDINE GLUCONATE 1 APPLICATION(S): 213 SOLUTION TOPICAL at 05:32

## 2023-09-28 RX ADMIN — HUMAN INSULIN 6 UNIT(S): 100 INJECTION, SUSPENSION SUBCUTANEOUS at 23:43

## 2023-09-28 RX ADMIN — TRANEXAMIC ACID 500 MILLIGRAM(S): 100 INJECTION, SOLUTION INTRAVENOUS at 01:08

## 2023-09-28 RX ADMIN — HUMAN INSULIN 6 UNIT(S): 100 INJECTION, SUSPENSION SUBCUTANEOUS at 00:06

## 2023-09-28 RX ADMIN — Medication 25 MILLIGRAM(S): at 05:31

## 2023-09-28 RX ADMIN — TRANEXAMIC ACID 500 MILLIGRAM(S): 100 INJECTION, SOLUTION INTRAVENOUS at 23:44

## 2023-09-28 NOTE — SWALLOW BEDSIDE ASSESSMENT ADULT - PHARYNGEAL PHASE
appearance of increased work of breathing, however spo2 remained 96-97/Delayed pharyngeal swallow/Multiple swallows Delayed pharyngeal swallow

## 2023-09-28 NOTE — PROGRESS NOTE ADULT - ASSESSMENT
# MCTD, ILD   #Acute hypoxic respiratory failure, DAH   # Acute severe hepatitis   # Epistaxis  # UTI, started on ABX 9/27       - Serology: ARIANA 1:1280 Speckled, dsDNA <12. + SM, + Anti-RNP, U1-snRNP RNP A 162, U1-snRNP RNP-70kd 111  - Skin rash on chest with periungual ulcers, ILD, leukopenia, thrombocytopenia.   - CT chest (8/28): Since August 26, 2023, again interstitial lung disease non-UIP pattern. Although no subpleural sparing, possibly interstitialpneumonia, differential includes NSIP associated with connective tissue disorders, chronic HP or drug toxicity.  - Bronchoscopy results (08/30): Bronchial tissue and interstitium showing organizing pneumonia and focal fibrin  - CT chest (9/11): Improved/decreased extent of bilateral groundglass opacities and reticular markings compared to the previous study. Findings are nonspecific but differential etiologies include interstitial pneumonia, drug toxicity, nonspecific connective tissue disorders. New small focus of parenchymal consolidation seen in the lingula;   possible small focal pneumonia.  - Repeat Bronchoscopy (9/13): BAL performed of lingula. Serial BAL x3 performed of RML with progressively bloody return.  - Lab showed thrombocytopenia since she developed respiratory failure ( normal on the first admission to [facility])   - LFT improving   - S/p Rituximab 1 gr on 9/16/23  - s/p plasmapheresis (First session (9/15), (9/18), (9/20)  - 9/20/2023: lungs are improving clinically and radiographically. Repeat bronch without signs of DAH  - 9/22: Pt extubated and d/c ECMO  - Off HFNC   -9/27: UTI started on ABX  epistaxis, s.p ENT eval- conservative management  heme fup- checking factor Xa       Recommendations:  - c/w solumedrol 80 mg daily, will consider taper next week  - next Rituximab dose due around 9/30  will postpone for early next week to get few doses of ABx ( UTI, worsening leukocytosis)   - will follow

## 2023-09-28 NOTE — SWALLOW BEDSIDE ASSESSMENT ADULT - ORAL PREPARATORY PHASE
Reduced oral grading
Reduced oral grading
Reduced oral grading/Anterior loss of bolus/Lateral loss of bolus

## 2023-09-28 NOTE — SWALLOW BEDSIDE ASSESSMENT ADULT - ORAL PHASE
Decreased anterior-posterior movement of the bolus
Decreased anterior-posterior movement of the bolus/Lingual stasis
suspected premature loss/Decreased anterior-posterior movement of the bolus

## 2023-09-28 NOTE — SWALLOW BEDSIDE ASSESSMENT ADULT - NS ASR SWALLOW FINDINGS DISCUS
Physician/Nursing/Patient/Family
Physician/Nursing/Patient/Family
attempted to call ECMO TEAM/Nursing/Patient/Family

## 2023-09-28 NOTE — SWALLOW BEDSIDE ASSESSMENT ADULT - COMMENTS
Pt. lying in bed with significant other at bedside upon SLP arrival. Pt. with HFNC and in NARD. Pt. with low, breathy vocal quality.     Per chart:   "61 yo F w/ Afib initially presented to urgent care for SOB where she had an XR done concerning for b/l lower infiltrates, sent to [facility] ED and admitted to  on 8/26 after being found to be hypoxemic, reported having  debilitating b/l hand numbness/tingling and some muscles weakness several months. She was found to have interstitial lung disease appearance on CT, with the plan for further ILD workup and management, started on prednisone on admission with gradually improving O2 requirements and stable on 2-3LNC. She underwent bronchoscopy with TBBX on 8/30 which showed Non-Specific Intersitial Disease (NSIP)/OP. Labs notable for positive ARIANA 1:1280, RNP > 8, Alejandro > 8. Patient continued on steroids and received cellcept, which was discontinued 2/2 Afib-RVR. Interval CTA chest on 9/11 also negative PE did show possible lingula pneumonia. Started on empiric vancomycin and zosyn 9/12, course was then c/b episode of SVT to 170s w/ rapidly progressive hypoxemic respiratory failure, placed NRB offered HFNC/BiPAP but refused, leading to worsening hypoxemic respiratory failure requiring intubation on 9/13. Despite best practices, patient remained hypoxemic and patient was cannulated for VV-ECMO on 9/13 and transferred to [facility] for further management. 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 Showed significant lung improvement and was decannulated 9/21. Extubated 9/22."    -HIGH WBC per lab review  -CXR: "clear lungs"    Per RN, pt. failed dysphagia screen. Significant other reported pt. more alert now and would like re-assessment. Pt. initially refused but agreed with encouragement.
per charting - Per chart:   "61 yo F w/ Afib initially presented to urgent care for SOB where she had an XR done concerning for b/l lower infiltrates, sent to [facility] ED and admitted to  on 8/26 after being found to be hypoxemic, reported having  debilitating b/l hand numbness/tingling and some muscles weakness several months. She was found to have interstitial lung disease appearance on CT, with the plan for further ILD workup and management, started on prednisone on admission with gradually improving O2 requirements and stable on 2-3LNC. She underwent bronchoscopy with TBBX on 8/30 which showed Non-Specific Intersitial Disease (NSIP)/OP. Labs notable for positive ARIANA 1:1280, RNP > 8, Alejandro > 8. Patient continued on steroids and received cellcept, which was discontinued 2/2 Afib-RVR. Interval CTA chest on 9/11 also negative PE did show possible lingula pneumonia. Started on empiric vancomycin and zosyn 9/12, course was then c/b episode of SVT to 170s w/ rapidly progressive hypoxemic respiratory failure, placed NRB offered HFNC/BiPAP but refused, leading to worsening hypoxemic respiratory failure requiring intubation on 9/13. Despite best practices, patient remained hypoxemic and patient was cannulated for VV-ECMO on 9/13 and transferred to [facility] for further management. 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 Showed significant lung improvement and was decannulated 9/21. Extubated 9/22."    -HIGH WBC per lab review  -CXR: "clear lungs"    pt seen for bedside swallow eval with partner at bedside. Pt is alert, attempting to communicate but is aphonic. Pt reportedly had better vocal quality yesterday but is fatigued today. Pt is on high flow o2, spo2 96 at baseline. Reportedly team to attempt weaning off high flow o2 today.
per charting- "63 yo F w/ Afib initially presented to urgent care for SOB where she had an XR done concerning for b/l lower infiltrates, sent to [facility] ED and admitted to  on 8/26 after being found to be hypoxemic, reported having  debilitating b/l hand numbness/tingling and some muscles weakness several months. She was found to have interstitial lung disease appearance on CT, with the plan for further ILD workup and management, started on prednisone on admission with gradually improving O2 requirements and stable on 2-3LNC. She underwent bronchoscopy with TBBX on 8/30 which showed Non-Specific Intersitial Disease (NSIP)/OP. Labs notable for positive ARIANA 1:1280, RNP > 8, Alejandro > 8. Patient continued on steroids and received cellcept, which was discontinued 2/2 Afib-RVR. Interval CTA chest on 9/11 also negative PE did show possible lingula pneumonia. Started on empiric vancomycin and zosyn 9/12, course was then c/b episode of SVT to 170s w/ rapidly progressive hypoxemic respiratory failure, placed NRB offered HFNC/BiPAP but refused, leading to worsening hypoxemic respiratory failure requiring intubation on 9/13    pt extubated 9/22  CXR negative  Pt seen upright in chair for swallow eval. Pt is familiar to this service from previous bedside encounters. Pt was scheduled for MBS 9/27, however canceled due to patient's overall medical status. Pt's significant other at bedside reporting improvement in mental status as well as intermittently improved vocal quality. Pt is alert, able to follow commands, presents with dysphonia marked by very poor vocal quality, however is able to achieve voice. o2 mask in place due to nasal bleeding, RN provides clearance to remove mask for PO trials. Baseline spo2 100, remains  when mask removed.

## 2023-09-28 NOTE — SWALLOW BEDSIDE ASSESSMENT ADULT - ASR SWALLOW RECOMMEND DIAG
Instrumental study not indicated given severity of deficits at bedside
VFSS/MBS
to objectively assess swallow funciton given long intubation, suspicion for laryngeal injury due to dysphonia s/p extubation and due to signs of pharyngeal dysphagia at bedside. WILL NEED ORDER PLACED FOR CINESOPHAGRAM/VFSS/MBS

## 2023-09-28 NOTE — SWALLOW BEDSIDE ASSESSMENT ADULT - SWALLOW EVAL: ORAL MUSCULATURE
generally intact/unable to assess due to poor participation/comprehension
generally intact
generally intact
.

## 2023-09-28 NOTE — PROGRESS NOTE ADULT - ASSESSMENT
63 yo F w/ Afib initially presented to urgent care for SOB where she had an XR done concerning for b/l lower infiltrates, sent to [facility] ED and admitted to  on 8/26 after being found to be hypoxemic, reported having  debilitating b/l hand numbness/tingling and some muscles weakness several months. She was found to have interstitial lung disease appearance on CT, with the plan for further ILD workup and management, started on prednisone on admission with gradually improving O2 requirements and stable on 2-3LNC. She underwent bronchoscopy with TBBX on 8/30 which showed Non-Specific Intersitial Disease (NSIP)/OP. Labs notable for positive ARIANA 1:1280, RNP > 8, Alejandro > 8. Patient continued on steroids and received cellcept, which was discontinued 2/2 Afib-RVR. Interval CTA chest on 9/11 also negative PE did show possible lingula pneumonia. Started on empiric vancomycin and zosyn 9/12, course was then c/b episode of SVT to 170s w/ rapidly progressive hypoxemic respiratory failure, placed NRB offered HFNC/BiPAP but refused, leading to worsening hypoxemic respiratory failure requiring intubation on 9/13. Despite best practices, patient remained hypoxemic and patient was cannulated for VV-ECMO on 9/13 and transferred to [facility] for further management. 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 Showed significant lung improvement and was decannulated 9/21. Extubated 9/22.     NEUROLOGY  Home meds: gabapentin 100g TID; will hold for now  AA&Ox3 at baseline   - following commands w/ delirium likely iso prolonged hospitalization, sedation, and ICU setting. Now greatly improved; following commands and understands conversation.  - required precedex for anxiety, stopped 9/22  - started seroquel 25 mg qhs, dose may have been too much given hypotension and lethargy, now d/c since the 25th.  - CTH 9/14 no acute findings  - PT/OT following - c/w kandi lift to chair twice daily, increase activity as tolerated    ENT  #Epistaxis   - small amount of intermittent bleeding noted in b/l nares and hemoptysis? relieved after applying pressure   - Will continue with afrin and nebulized TXA; once able to tolerate food, we will remove the NG tube and allow pressure to   - humidified air provided via face tent  - Nasal saline spray 1 spray each nose PRN   - Afrin 1 spray for rebleeding followed by direct pressure on the nose, if still actively bleeding, call ENT for reassessment  - Can consider use of nebulized TXA every 8 hours as needed if epistaxis not controlled by afrin and direct pressure.  - monitor hgb and for signs of overt bleeding closely       PULMONARY  Admitted to [facility] on 8/26 after p/w SOB, found to be hypoxemic, required 2-4L NC/bibap, initially responded well to IV steroids. 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 2/2 bacterial PNA vs atypical PNA vs aspiration vs AEILD continued to show significant improvement from a lung perspective and was decannulated 9/21, and extubated to HFNC 9/22.  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   - 9/20 BAL culture: normal respiratory selvin  - continue duonebs  - s/p empiric abx   - s/p decannulation on 9/21 with plan for suture removal ~7-10 days   - extubated 9/22 to HFNC   - weaned from HFNC to 6L nasal cannula,  placed back on HFNC 50%/40L for increased WOB and po2 60's,  will continue to titrate down O2 as tolerated  - POCUS exam showed small b/l consolidation, and atelectasis. Will encourage use of incentive spirometer     CARDIOVASCULAR  Hx of Afib w at OSH, started on Amiodarone gtt now in shock state requiring requiring pressors likely septic with component of vaspoplegia i/s/o sedation, possible contribution of cardiogenic from RV dysfunction. Now having episodes of SVT responsive to lopressor   - No PE seen on invasive pulmonary angiography at time of ECMO cannulation or in recent imaging  - NO2 weaned off  (9/15) RV function remains unchanged  - s/p milrinone, off since (9/13)  - converted to sinus (9/14) discontinued amio gtt iso bradycardia, back to AF with RVR on 9/19 when sedation weaned off  - continue 25mg metoprolol BID for rate control   - BP labile, has received hydralazine IVP for hypertensive episodes w/ anxiety likely a contributing factor, however is also requiring low dose Phenylephrine  for intermittent episodes of hypotension that occurs mostly while asleep.  - phenylephrine off since (9/25), continue midodrine 20mg TID    - RITCHIE 9/14 : No MEREDITH thrombus noted, negative for PFO. LVOT diameter of 2 cm  - TTE 9/12 with EF 65-70% with normal LV and RV  - TTE 9/14 with  EF 18%. Severe global LV systolic dysfunction. RV enlargement with decreased RV systolic function, however RITCHIE and recent POCUS shows normal LV systolic function, improved RV systolic function   - TTE 9/19 with recovered EF to 67% but still with RV enlargement and systolic dysfunction    GASTROINTESTINAL  Transaminase elevation likely 2/2 combination of shock liver, malposition of ECMO cannula and liver dysfunction  - ALK/ AST/ALT downtrending but remain elevated 490/136/225, T.bili peaked 19.3 mostly direct, and now 13.4  - Acute hepatitis panel negative  - RUQ US 9/15 shows fatty infiltration of liver, negative for hepatobiliary pathology, repeat RUQ US performed 9/22 for worsening LFT's and rising bili with findings negative as well  - Hepatology consulted - likely shock liver with expected delay in improvement in bilirubin  - Hypertriglyceridemia 836, hx of fatty liver and propofol gtt, s/p insulin gtt, now improving  - Last BM noted 9/24  - continue bowel regimen senna and miralax  - CT abdomen 9/15 w/o bowel obstruction, noted with colonic diverticulosis mostly in sigmoid, w/o evidence of diverticulitis  - continue PPI iso steroids   - nutrition following  - failed dysphagia screen x2 currently with KFT in place tolerating tube feeds  - will hold off cineesophagogram and reassess if ready for bedside FEESST in a few days     RENAL  sCr baseline 0.78  - Creatinine wnl  - s/p diuresis with lasix, last administered 9/15, fluid removal via hemo concentrator while on ECMO circuit, will assess daily if lasix is needed   - s/p Bumex 1mg IVP given to optimize post extubation   - good UO with primafit  - hypernatremia improved, free H2O discontinued 9/21  - IVF fluids started 9/26 for small IVC w/ negative fluid balance, now d/c'd concern for fluid overload, currently positive +2L/24 hours w/ worsening b-lines  on lung ultrasound      INFECTIOUS DISEASE  Leukocytosis  - Elevated WBC likely iso filgrastim (initiated 9/17 for leukopenia) as well as s/p ECMO decannulation, bandemia present but have now downtrended   - Afebrile, however blood and urine cultures were sent given uptrend of WBC  - UA+ (many bacteria), can start CTX 1gm IVPB daily (9/27-  )  - follow up blood and urine cultures sent 9/27  - previous cultures, BAL, cytopathology so far negative  - leukopenia now resolved s/p filgrastim (9/17-9/21)  - Bactrim DS discontinued iso leukopenia, continue Mepron for PJP prophylaxis instead  - s/p IV Ceftriaxone 5 days? at [facility] out of an abundance of precaution to cover BAL specimen  - s/p zosyn at OSH (9/12) for lingula PNA  - vancomycin (9/12-9/15 ) d/c'd negative mrsa PCR, and azithro (9/14-9/15) d/c'd neg Urine legionella  - s/p empiric donald (9/13-9/20) now that ANC >1.5  - vanco restarted for ppx on 9/18 d/t leukopenia but stopped on 9/20  - positive COVID-19 antigen in late August  - ID following- f/u recs    ENDOCRINE  # Hyperglycemia i/s/o steroids  Admission A1c 6.1  - s/p insulin gtt, transitioned to NPH 11 w/ISS,  NPH was lowered to 5u units, and now increased to 6units w/mod ISS given glucose >200 iso increase TF rate and hyperglycemia  - continue to adjust insulin and ISS as steroids are tapered and diet changes  - elevated triglycerides 835, has hx of fatty liver, had been on propofol gtt. TG downtrending since initiating insulin gtt for hyperglycemia, now normalizing off propofol  - TSH low initially at 0.13 repeat normal     HEME  #Right IJ thrombus   -s/p argatroban gtt , transitioned to lovenox   - Lowered lovenox dose 120mg BID to 100mg BID based on elevated Anti Xa level (1.14), will need redraw Anti Xa Friday 9/29/23, four hours after 4th dose lovenox 100mg adminstered  - INR elevated likely iso argatroban and liver dysfxn, s/p Vit. K (9/14) and FFP x1 (9/15) - now resolved  - platelets x 7 for thrombocytopenia <50,000, last transfused  9/21 continues to improve  - Intermittent episodes of epistaxis, no overt bleeding noted. Hgb remains stable   - Heme following- f/u recs    #Leukopenia  #Thrombocytopenia  - Heme consult placed for thrombocytopenia, noted to also be leukopenic but now resolved   - unlikely HLH/MAS since many abnormalities can be explained by severe hypoxemia/hypotension during initial decompensation prior to ecmo cannulation but some features that are consistent and with rheumatic disease it is a possibility to f/u hematology regarding utility of further lab/pathologic testing  - peripheral blood smear reviewed: large platelets noted, no platelet clumps, no blast cells noted, neutrophils noted w/ normal number of lobes, nucleated RBC noted  - acute hepatitis panel negative  - s/p filgrastim 480 daily  given ANC >1500 in the setting of possible infxn   - Thrombocytopenia refractory after intermittent transfusions will monitor now off circuit, platelet count continues to improve    #R/O DVT  - LE duplex negative for DVT   - first VA duplex post decannulation 9/21 shows non-occlusive deep vein thrombosis in the right jugular vein and the right cephalic is non-compressible  - c/w AC      MSK  Onset of debilitating b/l hand cramps and some muscles weakness x several months, unclear etiology, radiculopathy? vs myositis?   - MRI outpatient reportedly showed cervical spine issues, started on Prednisone shortly before admission at OSH  - myomarker panel + for RNP and Ku  - seen by neurologist at [facility]   - symptoms improved with cellcept (9/8-9/11) but discontinued given Afib RVR   - consider restarting home Gabapentin to assist with neuropathic pain? once more alert and awake  - f/u with Dr. Nik Marie or Dante Urbano for EMG upon discharge (osh?)      RHEUM  - started on Solumedrol 60mg q6h from 9/1 to 9/6 then weaned over time to then Medrol 32 mg 9/9 to 9/12 at [facility]  - s/p Cellcept 9/8 to 9/11 but stopped due to Afib- RVR/tachycardia   - CT findings, Improved/decreased extent of bilateral ground glass opacities and reticular markings compared to the previous study. Findings are nonspecific but differential etiologies include interstitial pneumonia, drug toxicity, nonspecific connective tissue disorders.  - OSH labs show strongly positive ARIANA, RNP, and anti smith antibodies with low C4 and normal C3. 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  - s/p rituximab 9/16/23  - will plan for next rituximab dose ~9/30/23       SKIN  Reportedly had single episode of painful rash on her shins, ears and neck and chest which resolved with a steroid cream from a dermatologist prior to admission  - no evidence of rash currently  - right groin with breakdown s/p cannula placement        PROPHYLAXIS  # DVT: argatroban  # GI: TF      LINES  -Ecmo Cannulas 9/13-9/21  -[facility] TLC- 9/13-9/21  -Left Ax [place] - 9/13-9/27 (placed at OSH)  -RA 16g PIV x2- 9/15      GOC  -Palliative Following  -full code  -partner Kiara at bedside daily and updated on care      63 yo F w/ Afib initially presented to urgent care for SOB where she had an XR done concerning for b/l lower infiltrates, sent to [facility] ED and admitted to  on 8/26 after being found to be hypoxemic, reported having  debilitating b/l hand numbness/tingling and some muscles weakness several months. She was found to have interstitial lung disease appearance on CT, with the plan for further ILD workup and management, started on prednisone on admission with gradually improving O2 requirements and stable on 2-3LNC. She underwent bronchoscopy with TBBX on 8/30 which showed Non-Specific Intersitial Disease (NSIP)/OP. Labs notable for positive ARIANA 1:1280, RNP > 8, Alejandro > 8. Patient continued on steroids and received cellcept, which was discontinued 2/2 Afib-RVR. Interval CTA chest on 9/11 also negative PE did show possible lingula pneumonia. Started on empiric vancomycin and zosyn 9/12, course was then c/b episode of SVT to 170s w/ rapidly progressive hypoxemic respiratory failure, placed NRB offered HFNC/BiPAP but refused, leading to worsening hypoxemic respiratory failure requiring intubation on 9/13. Despite best practices, patient remained hypoxemic and patient was cannulated for VV-ECMO on 9/13 and transferred to [facility] for further management. 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 Showed significant lung improvement and was decannulated 9/21. Extubated 9/22.     NEUROLOGY  Home meds: gabapentin 100g TID; will hold for now  AA&Ox3 at baseline   - following commands w/ delirium likely iso prolonged hospitalization, sedation, and ICU setting. Now greatly improved; following commands and understands conversation.  - required precedex for anxiety, stopped 9/22  - started seroquel 25 mg qhs, dose may have been too much given hypotension and lethargy, now d/c since the 25th.  - CTH 9/14 no acute findings  - PT/OT following - c/w kandi lift to chair twice daily, increase activity as tolerated    ENT  #Epistaxis   - small amount of intermittent bleeding noted in b/l nares and hemoptysis? relieved after applying pressure   - Will continue with afrin and nebulized TXA; once able to tolerate food, we will remove the NG tube and allow pressure to   - humidified air provided via face tent  - Nasal saline spray 1 spray each nose PRN   - Afrin 1 spray for rebleeding followed by direct pressure on the nose, if still actively bleeding, call ENT for reassessment  - Can consider use of nebulized TXA every 8 hours as needed if epistaxis not controlled by afrin and direct pressure.  - monitor hgb and for signs of overt bleeding closely       PULMONARY  Admitted to [facility] on 8/26 after p/w SOB, found to be hypoxemic, required 2-4L NC/bibap, initially responded well to IV steroids. 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 2/2 bacterial PNA vs atypical PNA vs aspiration vs AEILD continued to show significant improvement from a lung perspective and was decannulated 9/21, and extubated to HFNC 9/22.  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   - 9/20 BAL culture: normal respiratory selvin  - continue duonebs  - s/p empiric abx   - s/p decannulation on 9/21 with plan for suture removal ~7-10 days   - extubated 9/22 to HFNC   - weaned from HFNC to 6L nasal cannula, given epistaxis currently on face tent. Will continue with face tent untill epistaxis fully resolves    CARDIOVASCULAR  Hx of Afib w at OSH, started on Amiodarone gtt now in shock state requiring requiring pressors likely septic with component of vaspoplegia i/s/o sedation, possible contribution of cardiogenic from RV dysfunction. Now having episodes of SVT responsive to lopressor   - No PE seen on invasive pulmonary angiography at time of ECMO cannulation or in recent imaging  - NO2 weaned off  (9/15) RV function remains unchanged  - s/p milrinone, off since (9/13)  - converted to sinus (9/14) discontinued amio gtt iso bradycardia, back to AF with RVR on 9/19 when sedation weaned off  - continue 25mg metoprolol BID for rate control   - BP labile, has received hydralazine IVP for hypertensive episodes w/ anxiety likely a contributing factor, however is also requiring low dose Phenylephrine  for intermittent episodes of hypotension that occurs mostly while asleep.  - phenylephrine off since (9/25), continue midodrine 20mg TID    - RITCHIE 9/14 : No MEREDITH thrombus noted, negative for PFO. LVOT diameter of 2 cm  - TTE 9/12 with EF 65-70% with normal LV and RV  - TTE 9/14 with  EF 18%. Severe global LV systolic dysfunction. RV enlargement with decreased RV systolic function, however RITCHIE and recent POCUS shows normal LV systolic function, improved RV systolic function   - TTE 9/19 with recovered EF to 67% but still with RV enlargement and systolic dysfunction  - Currently on midodrine 20mg q8hrs; will titrate down as tolerated.    GASTROINTESTINAL  Transaminase elevation likely 2/2 combination of shock liver, malposition of ECMO cannula and liver dysfunction  - ALK/ AST/ALT downtrending but remain elevated 490/136/225, T.bili peaked 19.3 mostly direct, and now 13.4  - Acute hepatitis panel negative  - RUQ US 9/15 shows fatty infiltration of liver, negative for hepatobiliary pathology, repeat RUQ US performed 9/22 for worsening LFT's and rising bili with findings negative as well  - Hepatology consulted - likely shock liver with expected delay in improvement in bilirubin  - Hypertriglyceridemia 836, hx of fatty liver and propofol gtt, s/p insulin gtt, now improving  - Given watery bowel movements; will continue with senna only at this time.  - CT abdomen 9/15 w/o bowel obstruction, noted with colonic diverticulosis mostly in sigmoid, w/o evidence of diverticulitis  - nutrition following  - failed dysphagia screen x2 currently with KFT in place tolerating tube feeds. Patient doing much better today and will have another speech and swallow evaluation.    RENAL  sCr baseline 0.78  - Creatinine wnl  - s/p diuresis with lasix, last administered 9/15, fluid removal via hemo concentrator while on ECMO circuit, will assess daily if lasix is needed   - s/p Bumex 1mg IVP given to optimize post extubation   - good UO with primafit  - hypernatremia improved, free H2O discontinued 9/21  - IVF fluids started 9/26 for small IVC w/ negative fluid balance, now d/c'd concern for fluid overload, currently positive +2L/24 hours w/ worsening b-lines  on lung ultrasound.  - Breathing much better today; will d/c diuresis at this time    INFECTIOUS DISEASE  Leukocytosis  - Elevated WBC likely iso filgrastim (initiated 9/17 for leukopenia) as well as s/p ECMO decannulation, bandemia present but have now downtrended   - Afebrile, however blood and urine cultures were sent given uptrend of WBC  - UA with many bacteria, can start CTX 1gm IVPB daily (9/27-  ); will d/c if cultures are negative  - follow up blood and urine cultures sent 9/27  - previous cultures, BAL, cytopathology so far negative  - leukopenia now resolved s/p filgrastim (9/17-9/21)  - Bactrim DS discontinued iso leukopenia, continue Mepron for PJP prophylaxis instead  - s/p IV Ceftriaxone 5 days? at [facility] out of an abundance of precaution to cover BAL specimen  - s/p zosyn at OSH (9/12) for lingula PNA  - vancomycin (9/12-9/15 ) d/c'd negative mrsa PCR, and azithro (9/14-9/15) d/c'd neg Urine legionella  - s/p empiric donald (9/13-9/20) now that ANC >1.5  - vanco restarted for ppx on 9/18 d/t leukopenia but stopped on 9/20  - positive COVID-19 antigen in late August  - ID following- f/u recs    ENDOCRINE  # Hyperglycemia i/s/o steroids  Admission A1c 6.1  - s/p insulin gtt, transitioned to NPH 11 w/ISS,  NPH was lowered to 5u units, and now increased to 6units w/mod ISS given glucose >200 iso increase TF rate and hyperglycemia  - continue to adjust insulin and ISS as steroids are tapered and diet changes  - elevated triglycerides 835, has hx of fatty liver, had been on propofol gtt. TG downtrending since initiating insulin gtt for hyperglycemia, now normalizing off propofol  - TSH low initially at 0.13 repeat normal     HEME  #Right IJ thrombus   -s/p argatroban gtt , transitioned to lovenox   - Lowered lovenox dose 120mg BID to 100mg BID based on elevated Anti Xa level (1.14), will need redraw Anti Xa Friday 9/29/23, four hours after 4th dose lovenox 100mg adminstered  - INR elevated likely iso argatroban and liver dysfxn, s/p Vit. K (9/14) and FFP x1 (9/15) - now resolved  - platelets x 7 for thrombocytopenia <50,000, last transfused  9/21 continues to improve  - Intermittent episodes of epistaxis, no overt bleeding noted. Hgb remains stable     #Leukopenia  #Thrombocytopenia  - Heme consult placed for thrombocytopenia, noted to also be leukopenic but now resolved   - unlikely HLH/MAS since many abnormalities can be explained by severe hypoxemia/hypotension during initial decompensation prior to ecmo cannulation but some features that are consistent and with rheumatic disease it is a possibility to f/u hematology regarding utility of further lab/pathologic testing  - peripheral blood smear reviewed: large platelets noted, no platelet clumps, no blast cells noted, neutrophils noted w/ normal number of lobes, nucleated RBC noted  - acute hepatitis panel negative  - s/p filgrastim 480 daily  given ANC >1500 in the setting of possible infxn   - Thrombocytopenia refractory after intermittent transfusions will monitor now off circuit, platelet count continues to improve    #R/O DVT  - LE duplex negative for DVT   - first VA duplex post decannulation 9/21 shows non-occlusive deep vein thrombosis in the right jugular vein and the right cephalic is non-compressible  - c/w AC      MSK  Onset of debilitating b/l hand cramps and some muscles weakness x several months, unclear etiology, radiculopathy? vs myositis?   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 once more alert and awake  - f/u with Dr. Nik Marie or Dante Urbano for EMG upon discharge (osh?)      RHEUM  - started on Solumedrol 60mg q6h from 9/1 to 9/6 then weaned over time to then Medrol 32 mg 9/9 to 9/12 at H  - s/p Cellcept 9/8 to 9/11 but stopped due to Afib- RVR/tachycardia   - CT findings, Improved/decreased extent of bilateral ground glass opacities and reticular markings compared to the previous study. Findings are nonspecific but differential etiologies include interstitial pneumonia, drug toxicity, nonspecific connective tissue disorders.  - OSH labs show strongly positive ARIANA, RNP, and anti smith antibodies with low C4 and normal C3. 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

## 2023-09-28 NOTE — SWALLOW BEDSIDE ASSESSMENT ADULT - SWALLOW EVAL: RECOMMENDED DIET
puree/moderately thick liquid
Oral nutrition/hydration is contraindicated due to suspected severity of pharyngeal dysphagia. Recommend continue short term non oral means of nutrition/hydration at this time
NPO

## 2023-09-28 NOTE — PROGRESS NOTE ADULT - SUBJECTIVE AND OBJECTIVE BOX
ALIZA FOLEY  [id number]    INTERVAL HPI/OVERNIGHT EVENTS:  was having nose bleed, s/p ENT eval  not requiring pressors  started on ABX for presumed UTI   no acute events      MEDICATIONS  (STANDING):  albuterol/ipratropium for Nebulization 3 milliLiter(s) Nebulizer every 6 hours  artificial  tears Solution 1 Drop(s) Both EYES every 12 hours  atovaquone  Suspension 1500 milliGRAM(s) Oral daily  cefTRIAXone   IVPB 1000 milliGRAM(s) IV Intermittent every 24 hours  chlorhexidine 2% Cloths 1 Application(s) Topical <User Schedule>  dextrose 5%. 1000 milliLiter(s) (50 mL/Hr) IV Continuous <Continuous>  dextrose 50% Injectable 25 Gram(s) IV Push once  dextrose 50% Injectable 12.5 Gram(s) IV Push once  dextrose 50% Injectable 25 Gram(s) IV Push once  enoxaparin Injectable 100 milliGRAM(s) SubCutaneous <User Schedule>  folic acid 1 milliGRAM(s) Oral daily  glucagon  Injectable 1 milliGRAM(s) IntraMuscular once  insulin lispro (ADMELOG) corrective regimen sliding scale   SubCutaneous every 6 hours  insulin NPH human recombinant 6 Unit(s) SubCutaneous every 6 hours  methylPREDNISolone sodium succinate Injectable 80 milliGRAM(s) IV Push daily  metoprolol tartrate 25 milliGRAM(s) Oral two times a day  midodrine 20 milliGRAM(s) Oral every 8 hours  multivitamin/minerals/iron Oral Solution (CENTRUM) 15 milliLiter(s) Oral daily  oxymetazoline 0.05% Nasal Spray 1 Spray(s) Both Nostrils every 12 hours  pantoprazole  Injectable 40 milliGRAM(s) IV Push daily  psyllium Powder 1 Packet(s) Oral daily  senna Syrup 10 milliLiter(s) Oral at bedtime    MEDICATIONS  (PRN):  dextrose Oral Gel 15 Gram(s) Oral once PRN Blood Glucose LESS THAN 70 milliGRAM(s)/deciliter  sodium chloride 0.65% Nasal 1 Spray(s) Both Nostrils every 8 hours PRN Nasal Congestion  tranexamic acid Injectable for Nebulization 500 milliGRAM(s) Inhalation every 8 hours PRN Nose bleed      Allergies    No Known Allergies    Intolerances        Review of Systems:   unable to obtain from patient     Vital Signs Last 24 Hrs  T(C): 36.7 (28 Sep 2023 20:00), Max: 37.1 (28 Sep 2023 00:00)  T(F): 98.1 (28 Sep 2023 20:00), Max: 98.8 (28 Sep 2023 04:00)  HR: 70 (28 Sep 2023 21:00) (67 - 93)  BP: 114/66 (28 Sep 2023 21:00) (101/45 - 125/67)  BP(mean): 78 (28 Sep 2023 21:00) (55 - 81)  RR: 18 (28 Sep 2023 21:00) (18 - 37)  SpO2: 95% (28 Sep 2023 21:00) (92% - 100%)    Parameters below as of 28 Sep 2023 21:00  Patient On (Oxygen Delivery Method): face tent    O2 Concentration (%): 40    Physical Exam:  General: sleepy, difficult to arouse  HEENT: anterior nose dried blood   Cardio: +S1/S2, RRR  Resp: decreased breath sounds  GI: soft   MSK: no synovitis  periungual bleed   skin jaundiced       LABS:                        10.6   24.54 )-----------( 233      ( 28 Sep 2023 02:30 )             32.4     09-28    139  |  98  |  35<H>  ----------------------------<  119<H>  4.1   |  30  |  0.46<L>    Ca    9.8      28 Sep 2023 02:30  Phos  2.7     09-28  Mg     2.10     09-28    TPro  4.9<L>  /  Alb  2.9<L>  /  TBili  13.8<H>  /  DBili  x   /  AST  166<H>  /  ALT  227<H>  /  AlkPhos  688<H>  09-28    PT/INR - ( 28 Sep 2023 02:30 )   PT: 12.7 sec;   INR: 1.14 ratio         PTT - ( 28 Sep 2023 02:30 )  PTT:36.6 sec  Urinalysis Basic - ( 28 Sep 2023 02:30 )    Color: x / Appearance: x / SG: x / pH: x  Gluc: 119 mg/dL / Ketone: x  / Bili: x / Urobili: x   Blood: x / Protein: x / Nitrite: x   Leuk Esterase: x / RBC: x / WBC x   Sq Epi: x / Non Sq Epi: x / Bacteria: x          RADIOLOGY & ADDITIONAL TESTS:

## 2023-09-28 NOTE — PROGRESS NOTE ADULT - SUBJECTIVE AND OBJECTIVE BOX
INTERVAL HPI/OVERNIGHT EVENTS: No acute overnight events.    O/N: Patient continuing to have a slow anterior nasal bleed. Did not require pressors overnight, no tachycardic events. Slept through most of the night. Overall doing much better and is more awake.    SUBJECTIVE: Patient seen and examined at bedside.     CONSTITUTIONAL: No weakness, fevers or chills  EYES/ENT: No visual changes;  No vertigo or throat pain. Slow nasal drip of blood.  NECK: No pain or stiffness  RESPIRATORY: Good cough, able to bring up sputum. Currently on face tent.  CARDIOVASCULAR: No chest pain or palpitations  GASTROINTESTINAL: No abdominal or epigastric pain. No nausea, vomiting, or hematemesis; No diarrhea or constipation. No melena or hematochezia.  GENITOURINARY: No dysuria, frequency or hematuria  NEUROLOGICAL: No numbness or weakness  SKIN: No itching, rashes    OBJECTIVE:    VITAL SIGNS:  ICU Vital Signs Last 24 Hrs  T(C): 37.1 (28 Sep 2023 04:00), Max: 37.1 (27 Sep 2023 08:00)  T(F): 98.8 (28 Sep 2023 04:00), Max: 98.8 (28 Sep 2023 04:00)  HR: 75 (28 Sep 2023 06:00) (70 - 90)  BP: 112/56 (28 Sep 2023 06:00) (102/45 - 125/67)  BP(mean): 69 (28 Sep 2023 06:00) (55 - 85)  ABP: 123/68 (27 Sep 2023 17:00) (104/56 - 138/82)  ABP(mean): 90 (27 Sep 2023 17:00) (75 - 105)  RR: 22 (28 Sep 2023 06:44) (18 - 32)  SpO2: 96% (28 Sep 2023 06:44) (60% - 100%)    O2 Parameters below as of 28 Sep 2023 06:44  Patient On (Oxygen Delivery Method): face tent  O2 Flow (L/min): 10  O2 Concentration (%): 50          09-27 @ 07:01  -  09-28 @ 07:00  --------------------------------------------------------  IN: 1790 mL / OUT: 3260 mL / NET: -1470 mL      CAPILLARY BLOOD GLUCOSE      POCT Blood Glucose.: 120 mg/dL (28 Sep 2023 05:28)      PHYSICAL EXAM:    General: NAD  HEENT: NC/AT; PERRL, clear conjunctiva  Neck: supple  Respiratory: CTA b/l  Cardiovascular: +S1/S2; RRR  Abdomen: soft, NT/ND; +BS x4  Extremities: WWP, 2+ peripheral pulses b/l; no LE edema  Skin: normal color and turgor; no rash  Neurological:    MEDICATIONS:  MEDICATIONS  (STANDING):  albuterol/ipratropium for Nebulization 3 milliLiter(s) Nebulizer every 6 hours  artificial  tears Solution 1 Drop(s) Both EYES every 12 hours  atovaquone  Suspension 1500 milliGRAM(s) Oral daily  cefTRIAXone   IVPB 1000 milliGRAM(s) IV Intermittent every 24 hours  chlorhexidine 2% Cloths 1 Application(s) Topical <User Schedule>  dextrose 5%. 1000 milliLiter(s) (50 mL/Hr) IV Continuous <Continuous>  dextrose 50% Injectable 25 Gram(s) IV Push once  dextrose 50% Injectable 12.5 Gram(s) IV Push once  dextrose 50% Injectable 25 Gram(s) IV Push once  enoxaparin Injectable 100 milliGRAM(s) SubCutaneous <User Schedule>  folic acid 1 milliGRAM(s) Oral daily  glucagon  Injectable 1 milliGRAM(s) IntraMuscular once  insulin lispro (ADMELOG) corrective regimen sliding scale   SubCutaneous every 6 hours  insulin NPH human recombinant 6 Unit(s) SubCutaneous every 6 hours  methylPREDNISolone sodium succinate Injectable 80 milliGRAM(s) IV Push daily  metoprolol tartrate 25 milliGRAM(s) Oral two times a day  midodrine 20 milliGRAM(s) Oral every 8 hours  multivitamin/minerals/iron Oral Solution (CENTRUM) 15 milliLiter(s) Oral daily  oxymetazoline 0.05% Nasal Spray 1 Spray(s) Both Nostrils every 12 hours  pantoprazole  Injectable 40 milliGRAM(s) IV Push daily  psyllium Powder 1 Packet(s) Oral daily  senna Syrup 10 milliLiter(s) Oral at bedtime    MEDICATIONS  (PRN):  dextrose Oral Gel 15 Gram(s) Oral once PRN Blood Glucose LESS THAN 70 milliGRAM(s)/deciliter  sodium chloride 0.65% Nasal 1 Spray(s) Both Nostrils every 8 hours PRN Nasal Congestion  tranexamic acid Injectable for Nebulization 500 milliGRAM(s) Inhalation every 8 hours PRN Nose bleed      ALLERGIES:  Allergies    No Known Allergies    Intolerances        LABS:                        10.6   24.54 )-----------( 233      ( 28 Sep 2023 02:30 )             32.4     09-28    139  |  98  |  35<H>  ----------------------------<  119<H>  4.1   |  30  |  0.46<L>    Ca    9.8      28 Sep 2023 02:30  Phos  2.7     09-28  Mg     2.10     09-28    TPro  4.9<L>  /  Alb  2.9<L>  /  TBili  13.8<H>  /  DBili  x   /  AST  166<H>  /  ALT  227<H>  /  AlkPhos  688<H>  09-28    PT/INR - ( 28 Sep 2023 02:30 )   PT: 12.7 sec;   INR: 1.14 ratio         PTT - ( 28 Sep 2023 02:30 )  PTT:36.6 sec  Urinalysis Basic - ( 28 Sep 2023 02:30 )    Color: x / Appearance: x / SG: x / pH: x  Gluc: 119 mg/dL / Ketone: x  / Bili: x / Urobili: x   Blood: x / Protein: x / Nitrite: x   Leuk Esterase: x / RBC: x / WBC x   Sq Epi: x / Non Sq Epi: x / Bacteria: x        RADIOLOGY & ADDITIONAL TESTS: Reviewed.

## 2023-09-28 NOTE — SWALLOW BEDSIDE ASSESSMENT ADULT - SWALLOW EVAL: DIAGNOSIS
Pt presents with oropharyngeal dysphagia for puree, mildly thick and moderately thick liquids (liquids presented via cup and spoon) Oral stage marked by reduced oral grading, appearance of delayed posterior transfer, successful oral clearance post swallow. Pharyngeal stage marked by appearance of delayed swallow initiation for all consistencies. Appears to have increased work of breathing and multiple swallows for mildly thick liquids. No other signs of apsiration/penetration

## 2023-09-29 LAB
ALBUMIN SERPL ELPH-MCNC: 3 G/DL — LOW (ref 3.3–5)
ALP SERPL-CCNC: 682 U/L — HIGH (ref 40–120)
ALT FLD-CCNC: 206 U/L — HIGH (ref 4–33)
ANION GAP SERPL CALC-SCNC: 10 MMOL/L — SIGNIFICANT CHANGE UP (ref 7–14)
APTT BLD: 36.2 SEC — HIGH (ref 24.5–35.6)
AST SERPL-CCNC: 110 U/L — HIGH (ref 4–32)
BASE EXCESS BLDV CALC-SCNC: 9.9 MMOL/L — HIGH (ref -2–3)
BASOPHILS # BLD AUTO: 0.11 K/UL — SIGNIFICANT CHANGE UP (ref 0–0.2)
BASOPHILS NFR BLD AUTO: 0.5 % — SIGNIFICANT CHANGE UP (ref 0–2)
BILIRUB SERPL-MCNC: 12.2 MG/DL — HIGH (ref 0.2–1.2)
BLOOD GAS VENOUS COMPREHENSIVE RESULT: SIGNIFICANT CHANGE UP
BUN SERPL-MCNC: 34 MG/DL — HIGH (ref 7–23)
CALCIUM SERPL-MCNC: 9.7 MG/DL — SIGNIFICANT CHANGE UP (ref 8.4–10.5)
CHLORIDE BLDV-SCNC: 103 MMOL/L — SIGNIFICANT CHANGE UP (ref 96–108)
CHLORIDE SERPL-SCNC: 97 MMOL/L — LOW (ref 98–107)
CO2 BLDV-SCNC: 36.4 MMOL/L — HIGH (ref 22–26)
CO2 SERPL-SCNC: 29 MMOL/L — SIGNIFICANT CHANGE UP (ref 22–31)
CREAT SERPL-MCNC: 0.45 MG/DL — LOW (ref 0.5–1.3)
EGFR: 107 ML/MIN/1.73M2 — SIGNIFICANT CHANGE UP
EOSINOPHIL # BLD AUTO: 0 K/UL — SIGNIFICANT CHANGE UP (ref 0–0.5)
EOSINOPHIL NFR BLD AUTO: 0 % — SIGNIFICANT CHANGE UP (ref 0–6)
GAS PNL BLDV: 137 MMOL/L — SIGNIFICANT CHANGE UP (ref 136–145)
GLUCOSE BLDC GLUCOMTR-MCNC: 114 MG/DL — HIGH (ref 70–99)
GLUCOSE BLDC GLUCOMTR-MCNC: 155 MG/DL — HIGH (ref 70–99)
GLUCOSE BLDC GLUCOMTR-MCNC: 270 MG/DL — HIGH (ref 70–99)
GLUCOSE BLDV-MCNC: 128 MG/DL — HIGH (ref 70–99)
GLUCOSE SERPL-MCNC: 126 MG/DL — HIGH (ref 70–99)
HCO3 BLDV-SCNC: 35 MMOL/L — HIGH (ref 22–29)
HCT VFR BLD CALC: 32.3 % — LOW (ref 34.5–45)
HCT VFR BLDA CALC: 32 % — LOW (ref 34.5–46.5)
HGB BLD CALC-MCNC: 10.8 G/DL — LOW (ref 11.7–16.1)
HGB BLD-MCNC: 10.3 G/DL — LOW (ref 11.5–15.5)
IANC: 20.88 K/UL — HIGH (ref 1.8–7.4)
IMM GRANULOCYTES NFR BLD AUTO: 5.4 % — HIGH (ref 0–0.9)
INR BLD: 1.08 RATIO — SIGNIFICANT CHANGE UP (ref 0.85–1.18)
LACTATE BLDV-MCNC: 1.1 MMOL/L — SIGNIFICANT CHANGE UP (ref 0.5–2)
LMWH PPP CHRO-ACNC: 1.08 IU/ML — SIGNIFICANT CHANGE UP (ref 0.5–1.1)
LYMPHOCYTES # BLD AUTO: 0.3 K/UL — LOW (ref 1–3.3)
LYMPHOCYTES # BLD AUTO: 1.3 % — LOW (ref 13–44)
MAGNESIUM SERPL-MCNC: 2.2 MG/DL — SIGNIFICANT CHANGE UP (ref 1.6–2.6)
MCHC RBC-ENTMCNC: 31.9 GM/DL — LOW (ref 32–36)
MCHC RBC-ENTMCNC: 31.9 PG — SIGNIFICANT CHANGE UP (ref 27–34)
MCV RBC AUTO: 100 FL — SIGNIFICANT CHANGE UP (ref 80–100)
MONOCYTES # BLD AUTO: 0.61 K/UL — SIGNIFICANT CHANGE UP (ref 0–0.9)
MONOCYTES NFR BLD AUTO: 2.6 % — SIGNIFICANT CHANGE UP (ref 2–14)
MRSA PCR RESULT.: SIGNIFICANT CHANGE UP
NEUTROPHILS # BLD AUTO: 20.88 K/UL — HIGH (ref 1.8–7.4)
NEUTROPHILS NFR BLD AUTO: 90.2 % — HIGH (ref 43–77)
NRBC # BLD: 0 /100 WBCS — SIGNIFICANT CHANGE UP (ref 0–0)
NRBC # FLD: 0.07 K/UL — HIGH (ref 0–0)
PCO2 BLDV: 48 MMHG — SIGNIFICANT CHANGE UP (ref 39–52)
PH BLDV: 7.47 — HIGH (ref 7.32–7.43)
PHOSPHATE SERPL-MCNC: 2.4 MG/DL — LOW (ref 2.5–4.5)
PLATELET # BLD AUTO: 251 K/UL — SIGNIFICANT CHANGE UP (ref 150–400)
PO2 BLDV: 45 MMHG — SIGNIFICANT CHANGE UP (ref 25–45)
POTASSIUM BLDV-SCNC: 4.1 MMOL/L — SIGNIFICANT CHANGE UP (ref 3.5–5.1)
POTASSIUM SERPL-MCNC: 4.1 MMOL/L — SIGNIFICANT CHANGE UP (ref 3.5–5.3)
POTASSIUM SERPL-SCNC: 4.1 MMOL/L — SIGNIFICANT CHANGE UP (ref 3.5–5.3)
PROT SERPL-MCNC: 4.7 G/DL — LOW (ref 6–8.3)
PROTHROM AB SERPL-ACNC: 12.1 SEC — SIGNIFICANT CHANGE UP (ref 9.5–13)
RBC # BLD: 3.23 M/UL — LOW (ref 3.8–5.2)
RBC # FLD: 24.2 % — HIGH (ref 10.3–14.5)
S AUREUS DNA NOSE QL NAA+PROBE: SIGNIFICANT CHANGE UP
SAO2 % BLDV: 74.4 % — SIGNIFICANT CHANGE UP (ref 67–88)
SODIUM SERPL-SCNC: 136 MMOL/L — SIGNIFICANT CHANGE UP (ref 135–145)
WBC # BLD: 23.15 K/UL — HIGH (ref 3.8–10.5)
WBC # FLD AUTO: 23.15 K/UL — HIGH (ref 3.8–10.5)

## 2023-09-29 PROCEDURE — 99291 CRITICAL CARE FIRST HOUR: CPT

## 2023-09-29 RX ORDER — POTASSIUM PHOSPHATE, MONOBASIC POTASSIUM PHOSPHATE, DIBASIC 236; 224 MG/ML; MG/ML
15 INJECTION, SOLUTION INTRAVENOUS ONCE
Refills: 0 | Status: COMPLETED | OUTPATIENT
Start: 2023-09-29 | End: 2023-09-29

## 2023-09-29 RX ORDER — OXYMETAZOLINE HYDROCHLORIDE 0.5 MG/ML
1 SPRAY NASAL ONCE
Refills: 0 | Status: COMPLETED | OUTPATIENT
Start: 2023-09-29 | End: 2023-09-29

## 2023-09-29 RX ORDER — OXYMETAZOLINE HYDROCHLORIDE 0.5 MG/ML
2 SPRAY NASAL ONCE
Refills: 0 | Status: DISCONTINUED | OUTPATIENT
Start: 2023-09-29 | End: 2023-09-29

## 2023-09-29 RX ORDER — CEFTRIAXONE 500 MG/1
1000 INJECTION, POWDER, FOR SOLUTION INTRAMUSCULAR; INTRAVENOUS EVERY 24 HOURS
Refills: 0 | Status: DISCONTINUED | OUTPATIENT
Start: 2023-09-29 | End: 2023-10-03

## 2023-09-29 RX ORDER — VANCOMYCIN HCL 1 G
1500 VIAL (EA) INTRAVENOUS ONCE
Refills: 0 | Status: COMPLETED | OUTPATIENT
Start: 2023-09-29 | End: 2023-09-29

## 2023-09-29 RX ORDER — VANCOMYCIN HCL 1 G
1500 VIAL (EA) INTRAVENOUS EVERY 12 HOURS
Refills: 0 | Status: DISCONTINUED | OUTPATIENT
Start: 2023-09-29 | End: 2023-10-01

## 2023-09-29 RX ORDER — VANCOMYCIN HCL 1 G
VIAL (EA) INTRAVENOUS
Refills: 0 | Status: DISCONTINUED | OUTPATIENT
Start: 2023-09-29 | End: 2023-10-01

## 2023-09-29 RX ADMIN — Medication 1 MILLIGRAM(S): at 13:32

## 2023-09-29 RX ADMIN — Medication 300 MILLIGRAM(S): at 21:33

## 2023-09-29 RX ADMIN — ENOXAPARIN SODIUM 100 MILLIGRAM(S): 100 INJECTION SUBCUTANEOUS at 09:16

## 2023-09-29 RX ADMIN — OXYMETAZOLINE HYDROCHLORIDE 1 SPRAY(S): 0.5 SPRAY NASAL at 12:28

## 2023-09-29 RX ADMIN — Medication 30 MILLILITER(S): at 21:58

## 2023-09-29 RX ADMIN — Medication 15 MILLILITER(S): at 13:31

## 2023-09-29 RX ADMIN — Medication 25 MILLIGRAM(S): at 05:59

## 2023-09-29 RX ADMIN — Medication 3 MILLILITER(S): at 03:31

## 2023-09-29 RX ADMIN — MIDODRINE HYDROCHLORIDE 20 MILLIGRAM(S): 2.5 TABLET ORAL at 21:18

## 2023-09-29 RX ADMIN — TRANEXAMIC ACID 500 MILLIGRAM(S): 100 INJECTION, SOLUTION INTRAVENOUS at 10:23

## 2023-09-29 RX ADMIN — Medication 1 DROP(S): at 18:08

## 2023-09-29 RX ADMIN — HUMAN INSULIN 6 UNIT(S): 100 INJECTION, SUSPENSION SUBCUTANEOUS at 18:08

## 2023-09-29 RX ADMIN — Medication 25 MILLIGRAM(S): at 18:08

## 2023-09-29 RX ADMIN — HUMAN INSULIN 6 UNIT(S): 100 INJECTION, SUSPENSION SUBCUTANEOUS at 12:28

## 2023-09-29 RX ADMIN — Medication 3 MILLILITER(S): at 10:23

## 2023-09-29 RX ADMIN — Medication 80 MILLIGRAM(S): at 05:58

## 2023-09-29 RX ADMIN — Medication 3 MILLILITER(S): at 16:15

## 2023-09-29 RX ADMIN — OXYMETAZOLINE HYDROCHLORIDE 1 SPRAY(S): 0.5 SPRAY NASAL at 05:59

## 2023-09-29 RX ADMIN — MIDODRINE HYDROCHLORIDE 20 MILLIGRAM(S): 2.5 TABLET ORAL at 05:58

## 2023-09-29 RX ADMIN — MIDODRINE HYDROCHLORIDE 20 MILLIGRAM(S): 2.5 TABLET ORAL at 13:32

## 2023-09-29 RX ADMIN — ENOXAPARIN SODIUM 100 MILLIGRAM(S): 100 INJECTION SUBCUTANEOUS at 21:18

## 2023-09-29 RX ADMIN — CHLORHEXIDINE GLUCONATE 1 APPLICATION(S): 213 SOLUTION TOPICAL at 05:59

## 2023-09-29 RX ADMIN — CEFTRIAXONE 100 MILLIGRAM(S): 500 INJECTION, POWDER, FOR SOLUTION INTRAMUSCULAR; INTRAVENOUS at 09:53

## 2023-09-29 RX ADMIN — Medication 6: at 12:29

## 2023-09-29 RX ADMIN — POTASSIUM PHOSPHATE, MONOBASIC POTASSIUM PHOSPHATE, DIBASIC 62.5 MILLIMOLE(S): 236; 224 INJECTION, SOLUTION INTRAVENOUS at 12:27

## 2023-09-29 RX ADMIN — PANTOPRAZOLE SODIUM 40 MILLIGRAM(S): 20 TABLET, DELAYED RELEASE ORAL at 13:31

## 2023-09-29 RX ADMIN — Medication 300 MILLIGRAM(S): at 10:33

## 2023-09-29 RX ADMIN — Medication 1 DROP(S): at 05:59

## 2023-09-29 RX ADMIN — OXYMETAZOLINE HYDROCHLORIDE 1 SPRAY(S): 0.5 SPRAY NASAL at 18:08

## 2023-09-29 RX ADMIN — ATOVAQUONE 1500 MILLIGRAM(S): 750 SUSPENSION ORAL at 13:33

## 2023-09-29 RX ADMIN — Medication 3 MILLILITER(S): at 22:29

## 2023-09-29 RX ADMIN — HUMAN INSULIN 6 UNIT(S): 100 INJECTION, SUSPENSION SUBCUTANEOUS at 05:58

## 2023-09-29 NOTE — CONSULT NOTE ADULT - ASSESSMENT
Assessment/Plan: 63 yo F w/ Afib initially presented to urgent care for SOB where she had an XR done concerning for b/l lower infiltrates, sent to [facility] ED and admitted to  on 8/26 after being found to be hypoxemic, reported having  debilitating b/l hand numbness/tingling and some muscles weakness several months. She was found to have interstitial lung disease appearance on CT, with the plan for further ILD workup and management, started on prednisone on admission with gradually improving O2 requirements and stable on 2-3LNC. She underwent bronchoscopy with TBBX on 8/30 which showed Non-Specific Intersitial Disease (NSIP)/OP. Labs notable for positive ARIANA 1:1280, RNP > 8, Alejandro > 8. Patient continued on steroids and received cellcept, which was discontinued 2/2 Afib-RVR. Interval CTA chest on 9/11 also negative PE did show possible lingula pneumonia. Started on empiric vancomycin and zosyn 9/12, course was then c/b episode of SVT to 170s w/ rapidly progressive hypoxemic respiratory failure, placed NRB offered HFNC/BiPAP but refused, leading to worsening hypoxemic respiratory failure requiring intubation on 9/13. Despite best practices, patient remained hypoxemic and patient was cannulated for VV-ECMO on 9/13 and transferred to [facility] for further management. 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 Showed significant lung improvement and was decannulated 9/21. Extubated 9/22.     Wound Consult requested to assist w/ management of right groin wound s/p VV ECMO decannulation. Per MICU team wound culture swab was obtained, pending results.    Impression/Recommendations:  -Patient lethargic, tachypneic, supplemental oxygen via face tent.  -Sclera jaundiced; elevated bilirubin and LFTs, (+) abdominal pain with palpation; primary team aware  ·	mgmt per primary team.  -Oral and nasal mucosa friable; left nare with slow bleed; primary team aware.  ·	mgmt per primary team.  -Right nare with NGT for enteral feeds.  ·	Topical recommendations for Nasogastric Tube- Cleanse nare with NS. Pat dry. Apply Liquid barrier film to periwound skin. Apply Stat-lock NGT woodard over center of nare, not touch any skin circumferentially. Change every three days or prn if soiled or compromised.   -Moisture associated dermatitis to intertriginous folds  ·	submammary and abdominal pannus- increased moisture, skin intact.  Topical recommendations: Cleanse with soap and water, dry well. Apply Interdry textile sheeting, under intertriginous folds leaving 2 inches exposed at ends to wick, remove to wash & dry affected area, then replace. Individual sheeting may be used for up to 5 days unless soiled.   ·	Bilateral groin including right groin as previous cannulation site for VV ECMO- increased moisture with superficial ulcerations.   ·	Right groin: with thick, superficial layer of tan-grey adherent slough versus bioburden. 1 suture remains. No induration, no fluctuance, no crepitus, no surrounding erythema, increased edema, no obvious s/s of candidiasis. Does not appear acutely infected. Likely healing was/is compromised due to increased moisture and pressure from abdominal pannus. No additional cultures taken; no drainage noted, no puss pockets, no drainable collections. Of note: wound cultured by MICU team, suspect polymicrobial growth given that culture was obtained from what appears to be superficial nonviable tissue versus biofilm, without local s/s of infection would consider ID input after obtaining results.  ·	Left groin: denuded epidermis with pink-moist dermis covered with thin layer of translucent fibrin. No induration, no fluctuance, no crepitus, no surrounding erythema, increased edema, no obvious s/s of candidiasis. Does not appear acutely infected. Likely healing compromised by increased moisture. No additional cultures taken; no drainage noted, no puss pockets, no drainable collections.  ·	Wounds do not appear acutely infected would not recommend abx management for these wounds at this time; additionally no s/s of candidiasis would not recommend topical antifungals.   ·	No sharp debridement indicated.  ·	Goals of care to provide antiseptic cleansing and moisture control  Topical Recommendations: Cleanse bilateral groin with in circular motion with soap and sterile water (antiseptic), dry well. Apply TRIAD thin layer moisture barrier paste twice a day and prn. Apply Interdry textile sheeting, under intertriginous folds leaving 2 inches exposed at ends to wick, remove to wash & dry affected area, then replace. Individual sheeting may be used for up to 5 days unless soiled.   ·	If possible would be ideal to have patient in reverse Trendelenburg while in bed for proper pulmonary toileting, while limiting flexion of waist to minimize moisture collection within intertriginous folds to optimize healing  ·	Follow up with ECMO team for right neck and right groin suture removal.    Additional recommendations:  Nutrition Consult for optimization as tolerated   Continue to offload pressure; low airloss support surface, t&P per protocol with use of fluidized postioning devices, continue incontinence care per protocol with single breathable incontinence pad, continue to offload heels with complete cair boots.  Of note: due to positioning and to maintain patient safety unable to assess posterior trunk, sacrum, buttocks; if skin impairment is noted or concerns arise regarding skin integrity to these areas please reconsult.    Upon discharge f/u as outpatient at [facility] [address] [place], [place] [phone]    Patient seen with Dr. Ruiz today. Findings and plan discussed in detail with patient, patient's wife Kiara at bedside, primary MICU team LIZ Chandra.  Remainder of care per primary team.     Thank you for this consult.   Please reconsult as needed, thank you.  PRAKASH Cutler-BC, CWN (pager #[phone])    If after 4PM or before 7:30AM on Mon-Friday or weekend/holiday please contact general surgery for urgent matters.   Team A- [id number]   Team B- [id number]  For non-urgent matters e-mail [email or web address]    We spent 55 minutes face to face with this patient of which more than 50% of the time was spent counseling & coordinating care of this pt

## 2023-09-29 NOTE — PROGRESS NOTE ADULT - ASSESSMENT
61 yo F w/ Afib initially presented to urgent care for SOB where she had an XR done concerning for b/l lower infiltrates, sent to [facility] ED and admitted to  on 8/26 after being found to be hypoxemic, reported having  debilitating b/l hand numbness/tingling and some muscles weakness several months. She was found to have interstitial lung disease appearance on CT, with the plan for further ILD workup and management, started on prednisone on admission with gradually improving O2 requirements and stable on 2-3LNC. She underwent bronchoscopy with TBBX on 8/30 which showed Non-Specific Intersitial Disease (NSIP)/OP. Labs notable for positive ARIANA 1:1280, RNP > 8, Alejandro > 8. Patient continued on steroids and received cellcept, which was discontinued 2/2 Afib-RVR. Interval CTA chest on 9/11 also negative PE did show possible lingula pneumonia. Started on empiric vancomycin and zosyn 9/12, course was then c/b episode of SVT to 170s w/ rapidly progressive hypoxemic respiratory failure, placed NRB offered HFNC/BiPAP but refused, leading to worsening hypoxemic respiratory failure requiring intubation on 9/13. Despite best practices, patient remained hypoxemic and patient was cannulated for VV-ECMO on 9/13 and transferred to [facility] for further management. 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 Showed significant lung improvement and was decannulated 9/21. Extubated 9/22.     NEUROLOGY  Home meds: gabapentin 100g TID; will hold for now  AA&Ox3 at baseline   - following commands w/ delirium likely iso prolonged hospitalization, sedation, and ICU setting. Now greatly improved; following commands and understands conversation.  - required precedex for anxiety, stopped 9/22  - started seroquel 25 mg qhs, dose may have been too much given hypotension and lethargy, now d/c since the 25th.  - CTH 9/14 no acute findings  - PT/OT following - c/w kandi lift to chair twice daily, increase activity as tolerated    ENT  #Epistaxis   - small amount of intermittent bleeding noted in b/l nares and hemoptysis? relieved after applying pressure   - Will continue with afrin and nebulized TXA; once able to tolerate food, we will remove the NG tube and allow pressure to   - humidified air provided via face tent  - Nasal saline spray 1 spray each nose PRN   - Afrin 1 spray for rebleeding followed by direct pressure on the nose, if still actively bleeding, call ENT for reassessment  - Can consider use of nebulized TXA every 8 hours as needed if epistaxis not controlled by afrin and direct pressure.  - monitor hgb and for signs of overt bleeding closely       PULMONARY  Admitted to [facility] on 8/26 after p/w SOB, found to be hypoxemic, required 2-4L NC/bibap, initially responded well to IV steroids. 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 2/2 bacterial PNA vs atypical PNA vs aspiration vs AEILD continued to show significant improvement from a lung perspective and was decannulated 9/21, and extubated to HFNC 9/22.  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   - 9/20 BAL culture: normal respiratory selvin  - continue duonebs  - s/p empiric abx   - s/p decannulation on 9/21 with plan for suture removal ~7-10 days   - extubated 9/22 to HFNC   - weaned from HFNC to 6L nasal cannula, given epistaxis currently on face tent. Will continue with face tent untill epistaxis fully resolves    CARDIOVASCULAR  Hx of Afib w at OSH, started on Amiodarone gtt now in shock state requiring requiring pressors likely septic with component of vaspoplegia i/s/o sedation, possible contribution of cardiogenic from RV dysfunction. Now having episodes of SVT responsive to lopressor   - No PE seen on invasive pulmonary angiography at time of ECMO cannulation or in recent imaging  - NO2 weaned off  (9/15) RV function remains unchanged  - s/p milrinone, off since (9/13)  - converted to sinus (9/14) discontinued amio gtt iso bradycardia, back to AF with RVR on 9/19 when sedation weaned off  - continue 25mg metoprolol BID for rate control   - BP labile, has received hydralazine IVP for hypertensive episodes w/ anxiety likely a contributing factor, however is also requiring low dose Phenylephrine  for intermittent episodes of hypotension that occurs mostly while asleep.  - phenylephrine off since (9/25), continue midodrine 20mg TID    - RITCHIE 9/14 : No MEREDITH thrombus noted, negative for PFO. LVOT diameter of 2 cm  - TTE 9/12 with EF 65-70% with normal LV and RV  - TTE 9/14 with  EF 18%. Severe global LV systolic dysfunction. RV enlargement with decreased RV systolic function, however RITCHIE and recent POCUS shows normal LV systolic function, improved RV systolic function   - TTE 9/19 with recovered EF to 67% but still with RV enlargement and systolic dysfunction  - Currently on midodrine 20mg q8hrs; will titrate down as tolerated.    GASTROINTESTINAL  Transaminase elevation likely 2/2 combination of shock liver, malposition of ECMO cannula and liver dysfunction  - ALK/ AST/ALT downtrending but remain elevated 490/136/225, T.bili peaked 19.3 mostly direct, and now 13.4  - Acute hepatitis panel negative  - RUQ US 9/15 shows fatty infiltration of liver, negative for hepatobiliary pathology, repeat RUQ US performed 9/22 for worsening LFT's and rising bili with findings negative as well  - Hepatology consulted - likely shock liver with expected delay in improvement in bilirubin  - Hypertriglyceridemia 836, hx of fatty liver and propofol gtt, s/p insulin gtt, now improving  - Given watery bowel movements; will continue with senna only at this time.  - CT abdomen 9/15 w/o bowel obstruction, noted with colonic diverticulosis mostly in sigmoid, w/o evidence of diverticulitis  - nutrition following  - failed dysphagia screen x2 currently with KFT in place tolerating tube feeds. Patient doing much better today and will have another speech and swallow evaluation.    RENAL  sCr baseline 0.78  - Creatinine wnl  - s/p diuresis with lasix, last administered 9/15, fluid removal via hemo concentrator while on ECMO circuit, will assess daily if lasix is needed   - s/p Bumex 1mg IVP given to optimize post extubation   - good UO with primafit  - hypernatremia improved, free H2O discontinued 9/21  - IVF fluids started 9/26 for small IVC w/ negative fluid balance, now d/c'd concern for fluid overload, currently positive +2L/24 hours w/ worsening b-lines  on lung ultrasound.  - Breathing much better today; will d/c diuresis at this time    INFECTIOUS DISEASE  Leukocytosis  - Elevated WBC likely iso filgrastim (initiated 9/17 for leukopenia) as well as s/p ECMO decannulation, bandemia present but have now downtrended   - Afebrile, however blood and urine cultures were sent given uptrend of WBC  - UA with many bacteria, can start CTX 1gm IVPB daily (9/27-  ); will d/c if cultures are negative  - follow up blood and urine cultures sent 9/27  - previous cultures, BAL, cytopathology so far negative  - leukopenia now resolved s/p filgrastim (9/17-9/21)  - Bactrim DS discontinued iso leukopenia, continue Mepron for PJP prophylaxis instead  - s/p IV Ceftriaxone 5 days? at [facility] out of an abundance of precaution to cover BAL specimen  - s/p zosyn at OSH (9/12) for lingula PNA  - vancomycin (9/12-9/15 ) d/c'd negative mrsa PCR, and azithro (9/14-9/15) d/c'd neg Urine legionella  - s/p empiric donald (9/13-9/20) now that ANC >1.5  - vanco restarted for ppx on 9/18 d/t leukopenia but stopped on 9/20  - positive COVID-19 antigen in late August  - ID following- f/u recs    ENDOCRINE  # Hyperglycemia i/s/o steroids  Admission A1c 6.1  - s/p insulin gtt, transitioned to NPH 11 w/ISS,  NPH was lowered to 5u units, and now increased to 6units w/mod ISS given glucose >200 iso increase TF rate and hyperglycemia  - continue to adjust insulin and ISS as steroids are tapered and diet changes  - elevated triglycerides 835, has hx of fatty liver, had been on propofol gtt. TG downtrending since initiating insulin gtt for hyperglycemia, now normalizing off propofol  - TSH low initially at 0.13 repeat normal     HEME  #Right IJ thrombus   -s/p argatroban gtt , transitioned to lovenox   - Lowered lovenox dose 120mg BID to 100mg BID based on elevated Anti Xa level (1.14), will need redraw Anti Xa Friday 9/29/23, four hours after 4th dose lovenox 100mg adminstered  - INR elevated likely iso argatroban and liver dysfxn, s/p Vit. K (9/14) and FFP x1 (9/15) - now resolved  - platelets x 7 for thrombocytopenia <50,000, last transfused  9/21 continues to improve  - Intermittent episodes of epistaxis, no overt bleeding noted. Hgb remains stable     #Leukopenia  #Thrombocytopenia  - Heme consult placed for thrombocytopenia, noted to also be leukopenic but now resolved   - unlikely HLH/MAS since many abnormalities can be explained by severe hypoxemia/hypotension during initial decompensation prior to ecmo cannulation but some features that are consistent and with rheumatic disease it is a possibility to f/u hematology regarding utility of further lab/pathologic testing  - peripheral blood smear reviewed: large platelets noted, no platelet clumps, no blast cells noted, neutrophils noted w/ normal number of lobes, nucleated RBC noted  - acute hepatitis panel negative  - s/p filgrastim 480 daily  given ANC >1500 in the setting of possible infxn   - Thrombocytopenia refractory after intermittent transfusions will monitor now off circuit, platelet count continues to improve    #R/O DVT  - LE duplex negative for DVT   - first VA duplex post decannulation 9/21 shows non-occlusive deep vein thrombosis in the right jugular vein and the right cephalic is non-compressible  - c/w AC      MSK  Onset of debilitating b/l hand cramps and some muscles weakness x several months, unclear etiology, radiculopathy? vs myositis?   - MRI outpatient reportedly showed cervical spine issues, started on Prednisone shortly before admission at OSH  - myomarker panel + for RNP and Ku  - seen by neurologist at [facility]   - symptoms improved with cellcept (9/8-9/11) but discontinued given Afib RVR   - consider restarting home Gabapentin to assist with neuropathic pain? once more alert and awake  - f/u with Dr. Nik Marie or Dante Urbano for EMG upon discharge (osh?)      RHEUM  - started on Solumedrol 60mg q6h from 9/1 to 9/6 then weaned over time to then Medrol 32 mg 9/9 to 9/12 at H  - s/p Cellcept 9/8 to 9/11 but stopped due to Afib- RVR/tachycardia   - CT findings, Improved/decreased extent of bilateral ground glass opacities and reticular markings compared to the previous study. Findings are nonspecific but differential etiologies include interstitial pneumonia, drug toxicity, nonspecific connective tissue disorders.  - OSH labs show strongly positive ARIANA, RNP, and anti smith antibodies with low C4 and normal C3. 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  - s/p rituximab 9/16/23  - will plan for next rituximab dose ~9/30/23       SKIN  Reportedly had single episode of painful rash on her shins, ears and neck and chest which resolved with a steroid cream from a dermatologist prior to admission  - no evidence of rash currently  - right groin with breakdown s/p cannula placement        PROPHYLAXIS  # DVT: Lovenox  # GI: TF      LINES  -Ecmo Cannulas 9/13-9/21  -LIJ TLC- 9/13-9/21  -Left Ax Vaiden - 9/13-9/27 (placed at OSH)  -RA 16g PIV x2- 9/15      GOC  -Palliative Following  -full code  -partner Kiara at bedside daily and updated on care    63 yo F w/ Afib initially presented to urgent care for SOB where she had an XR done concerning for b/l lower infiltrates, sent to [facility] ED and admitted to  on 8/26 after being found to be hypoxemic, reported having  debilitating b/l hand numbness/tingling and some muscles weakness several months. She was found to have interstitial lung disease appearance on CT, with the plan for further ILD workup and management, started on prednisone on admission with gradually improving O2 requirements and stable on 2-3LNC. She underwent bronchoscopy with TBBX on 8/30 which showed Non-Specific Intersitial Disease (NSIP)/OP. Labs notable for positive ARIANA 1:1280, RNP > 8, Alejandro > 8. Patient continued on steroids and received cellcept, which was discontinued 2/2 Afib-RVR. Interval CTA chest on 9/11 also negative PE did show possible lingula pneumonia. Started on empiric vancomycin and zosyn 9/12, course was then c/b episode of SVT to 170s w/ rapidly progressive hypoxemic respiratory failure, placed NRB offered HFNC/BiPAP but refused, leading to worsening hypoxemic respiratory failure requiring intubation on 9/13. Despite best practices, patient remained hypoxemic and patient was cannulated for VV-ECMO on 9/13 and transferred to [facility] for further management. 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 Showed significant lung improvement and was decannulated 9/21. Extubated 9/22.     NEUROLOGY  Home meds: gabapentin 100g TID; holding for now  AA&Ox3 at baseline   - following commands w/ delirium likely iso prolonged hospitalization, sedation, and ICU setting. Now improved; following commands and understands conversation.  - required precedex for anxiety, stopped 9/22  - started seroquel 25 mg qhs, dose may have been too much given hypotension and lethargy, now d/c since the 25th.  - CTH 9/14 no acute findings  - PT/OT following - c/w kandi lift to chair twice daily, increase activity as tolerated    ENT  #Epistaxis   - small amount of intermittent bleeding noted in b/l nares and hemoptysis? relieved after applying pressure   - Will continue with afrin and nebulized TXA prn; once able to tolerate food, we will remove the NG tube and allow pressure to   - humidified air provided via face tent  - Nasal saline spray 1 spray each nose PRN   - Afrin 1 spray for rebleeding followed by direct pressure on the nose, if still actively bleeding, call ENT for reassessment  - monitor hgb and for signs of overt bleeding closely       PULMONARY  Admitted to [facility] on 8/26 after p/w SOB, found to be hypoxemic, required 2-4L NC/bibap, initially responded well to IV steroids. 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 2/2 bacterial PNA vs atypical PNA vs aspiration vs AEILD continued to show significant improvement from a lung perspective and was decannulated 9/21, and extubated to HFNC 9/22.  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   - 9/20 BAL culture: normal respiratory selvin  - continue duonebs  - s/p empiric abx   - s/p decannulation on 9/21 with plan for suture removal ~7-10 days   - extubated 9/22 to HFNC   - weaned from HFNC to 6L nasal cannula, given epistaxis currently on face tent. Will continue with face tent until epistaxis fully resolves    CARDIOVASCULAR  Hx of Afib w at OSH, started on Amiodarone gtt now in shock state requiring requiring pressors likely septic with component of vaspoplegia i/s/o sedation, possible contribution of cardiogenic from RV dysfunction. Now having episodes of SVT responsive to lopressor   - No PE seen on invasive pulmonary angiography at time of ECMO cannulation or in recent imaging  - NO2 weaned off  (9/15) RV function remains unchanged  - s/p milrinone, off since (9/13)  - converted to sinus (9/14) discontinued amio gtt iso bradycardia, back to AF with RVR on 9/19 when sedation weaned off  - continue 25mg metoprolol BID for rate control   - BP labile, has received hydralazine IVP for hypertensive episodes w/ anxiety likely a contributing factor, however is also requiring low dose Phenylephrine  for intermittent episodes of hypotension that occurs mostly while asleep.  - phenylephrine off since (9/25), continue midodrine 20mg TID    - RITCHIE 9/14 : No MEREDITH thrombus noted, negative for PFO. LVOT diameter of 2 cm  - TTE 9/12 with EF 65-70% with normal LV and RV  - TTE 9/14 with  EF 18%. Severe global LV systolic dysfunction. RV enlargement with decreased RV systolic function, however RITCHIE and recent POCUS shows normal LV systolic function, improved RV systolic function   - TTE 9/19 with recovered EF to 67% but still with RV enlargement and systolic dysfunction  - Currently on midodrine 20mg q8hrs; will titrate down as tolerated.    GASTROINTESTINAL  Transaminase elevation likely 2/2 combination of shock liver, malposition of ECMO cannula and liver dysfunction  - ALK/ AST/ALT downtrending but remain elevated 490/136/225, T.bili peaked 19.3 mostly direct, and now 13.4  - Acute hepatitis panel negative  - RUQ US 9/15 shows fatty infiltration of liver, negative for hepatobiliary pathology, repeat RUQ US performed 9/22 for worsening LFT's and rising bili with findings negative as well  - Hepatology consulted - likely shock liver with expected delay in improvement in bilirubin  - Hypertriglyceridemia 836, hx of fatty liver and propofol gtt, s/p insulin gtt, now improving  - Given watery bowel movements; will continue with senna only at this time.  - CT abdomen 9/15 w/o bowel obstruction, noted with colonic diverticulosis mostly in sigmoid, w/o evidence of diverticulitis  - nutrition following  - failed dysphagia screen x2 currently with KFT in place tolerating tube feeds, plan for cineesophagogram     RENAL  sCr baseline 0.78  - Creatinine wnl  - s/p diuresis with lasix, last administered 9/15, fluid removal via hemo concentrator while on ECMO circuit, will assess daily if lasix is needed   - s/p Bumex 1mg IVP given to optimize post extubation   - good UO with primafit  - hypernatremia improved, free H2O discontinued 9/21      INFECTIOUS DISEASE  Leukocytosis  - Elevated WBC likely iso filgrastim (initiated 9/17 for leukopenia) as well as s/p ECMO decannulation, bandemia present but have now downtrended   - Afebrile currently   -right wound s/p ecmo cannula site ?infected will start vancomycin for now (9/29-   ) and culture area   - UA with many bacteria, currently on CTX 1gm IVPB daily (9/27-  )   -blood and urine cultures sent 9/27 currently NGTD  - previous cultures, BAL, cytopathology so far negative  - leukopenia now resolved s/p filgrastim (9/17-9/21)  - Bactrim DS discontinued iso leukopenia, continue Mepron for PJP prophylaxis instead  - s/p IV Ceftriaxone 5 days? at [facility] out of an abundance of precaution to cover BAL specimen  - s/p zosyn at OSH (9/12) for lingula PNA  - vancomycin (9/12-9/15 ) d/c'd negative mrsa PCR, and azithro (9/14-9/15) d/c'd neg Urine legionella  - s/p empiric donald (9/13-9/20) now that ANC >1.5  - vanco restarted for ppx on 9/18 d/t leukopenia but stopped on 9/20  - positive COVID-19 antigen in late August  - ID following- f/u recs    ENDOCRINE  # Hyperglycemia i/s/o steroids  Admission A1c 6.1  - s/p insulin gtt, transitioned to NPH 11 w/ISS,  NPH was lowered to 5u units, and now increased to 6units w/mod ISS given glucose >200 iso increase TF rate and hyperglycemia  - continue to adjust insulin and ISS as steroids are tapered and diet changes  - elevated triglycerides 835, has hx of fatty liver, had been on propofol gtt. TG downtrending since initiating insulin gtt for hyperglycemia, now normalizing off propofol  - TSH low initially at 0.13 repeat normal     HEME  #Right IJ thrombus   -s/p argatroban gtt , transitioned to lovenox   - Lowered lovenox dose from 120mg BID to 100mg BID based on elevated Anti Xa level (1.14), plan to redraw Anti Xa today 9/29/23, four hours after 4th dose lovenox 100mg administered  - INR elevated likely iso argatroban and liver dysfxn, s/p Vit. K (9/14) and FFP x1 (9/15) - now resolved  - platelets x 7 for thrombocytopenia <50,000, last transfused  9/21 continues to improve  - Intermittent episodes of epistaxis, no overt bleeding noted. Hgb remains stable     #Leukopenia  #Thrombocytopenia  - Heme consult placed for thrombocytopenia, noted to also be leukopenic but now resolved   - unlikely HLH/MAS since many abnormalities can be explained by severe hypoxemia/hypotension during initial decompensation prior to ecmo cannulation but some features that are consistent and with rheumatic disease it is a possibility to f/u hematology regarding utility of further lab/pathologic testing  - peripheral blood smear reviewed: large platelets noted, no platelet clumps, no blast cells noted, neutrophils noted w/ normal number of lobes, nucleated RBC noted  - acute hepatitis panel negative  - s/p filgrastim 480 daily  given ANC >1500 in the setting of possible infxn   - Thrombocytopenia refractory after intermittent transfusions will monitor now off circuit, platelet count continues to improve    #R/O DVT  - LE duplex negative for DVT   - first VA duplex post decannulation 9/21 shows non-occlusive deep vein thrombosis in the right jugular vein and the right cephalic is non-compressible  - c/w AC      MSK  Onset of debilitating b/l hand cramps and some muscles weakness x several months, unclear etiology, radiculopathy? vs myositis?   - MRI outpatient reportedly showed cervical spine issues, started on Prednisone shortly before admission at OSH  - myomarker panel + for RNP and Ku  - seen by neurologist at [facility]   - symptoms improved with cellcept (9/8-9/11) but discontinued given Afib RVR   - consider restarting home Gabapentin to assist with neuropathic pain? once more alert and awake  - f/u with Dr. Nik Marie or Dante Urbano for EMG upon discharge (osh?)      RHEUM  - started on Solumedrol 60mg q6h from 9/1 to 9/6 then weaned over time to then Medrol 32 mg 9/9 to 9/12 at [facility]  - s/p Cellcept 9/8 to 9/11 but stopped due to Afib- RVR/tachycardia   - CT findings, Improved/decreased extent of bilateral ground glass opacities and reticular markings compared to the previous study. Findings are nonspecific but differential etiologies include interstitial pneumonia, drug toxicity, nonspecific connective tissue disorders.  - OSH labs show strongly positive ARIANA, RNP, and anti smith antibodies with low C4 and normal C3. 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  - s/p rituximab 9/16/23  - plan for next rituximab dose  was for ~9/30/23 but may hold off given patient on abx        SKIN  Reportedly had single episode of painful rash on her shins, ears and neck and chest which resolved with a steroid cream from a dermatologist prior to admission  - no evidence of rash currently  - right groin with breakdown s/p cannula placement may now be source of infection, will culture and consult wound care         PROPHYLAXIS  # DVT: Lovenox  # GI: TF      LINES  -Ecmo Cannulas 9/13-9/21  -[facility] TLC- 9/13-9/21  -Left Ax [place] - 9/13-9/27 (placed at OSH)  -RA 16g PIV x2- 9/15      GO  -Palliative Following  -full code  -partner Kiara at bedside daily and updated on care

## 2023-09-29 NOTE — CONSULT NOTE ADULT - SUBJECTIVE AND OBJECTIVE BOX
Wound SURGERY CONSULT NOTE    HPI:  62 year old female with a past medical history of afib, and sciatica, who presented to [facility] for shortness of breath. She had gone to [facility] where she had CXR which showed bilateral lower lobe infiltrates so was sent to ER. She had been having exertional dyspnea and hypoxemia (on home pulse ox to 82%). She had been having dyspnea for several months and had a workup in [place] with a CT scan (which was negative for PE). She also states that she was seen by a pulmonologist and rheumatology, where they ruled out lupus and other immunological disorders.    [facility] Hospital Course  Found to be hypoxic and have interstitial lung disease appearance on CT, admitted 8/26 for AHRF, further ILD workup and management. TTE 8/26 with normal LVEF. Pt was started on prednisone on admission with gradually improving O2 requirement and has been stable on 2-3LNC since 9/3. Started steroid taper on 9/6 and fully transitioned to PO 9/8 overnight. Started on Mycophenolate mofetil (cellcept) 9/8 evening but pt reported subjective side effects with weakness. Episode of AF w RVR with HR up to 140s on 9/11 am, decreased to HR 10-110s with IV lopressor 10. CTA negative for PE and showed interval improvements in GGO but possible lingular bleb and RML bronchiectasis. Patient received 2L of but before more fluids and beta-blocker pt developed heart palpitations with EKG HR 170s with SVT. BPs 105/70s. Did not respond to vagal maneuvers. Pt given oral lopressor 12.5 and IV lopressor 5, with improvement of HR to 130s. SBP briefly dropped to 60-70s then recovered. Patient on NRB offered HFNC/BiPAP but refused. Started on empiric vancomycin and zosyn. BCx w/ NGTD. Per pulm increased solumedrol to 40mg qd. Patient acceded to HFNC in which she desatted and presented with increased wob for which she was transitioned to BiPAP. Patient with anxiety while on BiPAP presenting tachypnea and desatting to the 80s despite verbal redirection for which she was administered ativan 1mg IVP x1. Patient distress improved with sats in the low 90s but had desaturation to 70s. STAT CXR showed increased pulmonary congestion for which patient was administered lasix without improvement. Intubated and started on mechanical ventilation but required very high PEEP (18) and 100% FiO2 and still had low saturations. VV ECMO team consulted and accepted to [facility]. Per signout patient had RV enlargement and dysfunction on POCUS with concern for either new PE vs high pulmonary pressures due to PEEP 18 and lung dysfunction. Patient went for cannulation at [facility] with flouro showing no acute PE. Cannulated with 25 F drainage cannula in R Fem V and 23F “arterial” cannula in RIJ.    (13 Sep 2023 15:24)    Wound consult requested to assist w/ management of right groin wound s/p VV ECMO decannulation. Per MICU team wound culture swab was obtained, pending results. Patient sitting in chair with wife Kiara at bedside. Patient denies pain to groin; c/o abdominal discomfort; denies n/v, fever chills. Patient lethargic, per Kiara at bedside Mrs. Hager has been through a lot, seems to be slowly improving, has been extremely tired, which is not surprising. Kiara reports that Zo had a flexiseal in place that has since been removed, but sporadically complains of pain to her buttocks since removal; primary team and RN aware. Prior to hospitalization Mrs. 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      Current Diet: Diet, NPO with Tube Feed:   Tube Feeding Modality: Orogastric  Glucerna 1.5 Marvin (GLUCERNA1.5RTH)  Total Volume for 24 Hours (mL): 1320  Continuous  Starting Tube Feed Rate mL per Hour: 55  Until Goal Tube Feed Rate (mL per Hour): 55  Tube Feed Duration (in Hours): 24  Tube Feed Start Time: 14:00  No Carb Prosource (1pkg = 15gms Protein)     Qty per Day:  3 (09-26-23 @ 14:05)      PAST MEDICAL & SURGICAL HISTORY:      REVIEW OF SYSTEMS: General, skin. See above.  ROS limited, patient lethargic minimally verbal.       MEDICATIONS  (STANDING):  albuterol/ipratropium for Nebulization 3 milliLiter(s) Nebulizer every 6 hours  artificial  tears Solution 1 Drop(s) Both EYES every 12 hours  atovaquone  Suspension 1500 milliGRAM(s) Oral daily  cefTRIAXone   IVPB 1000 milliGRAM(s) IV Intermittent every 24 hours  chlorhexidine 2% Cloths 1 Application(s) Topical <User Schedule>  dextrose 5%. 1000 milliLiter(s) (50 mL/Hr) IV Continuous <Continuous>  dextrose 50% Injectable 12.5 Gram(s) IV Push once  dextrose 50% Injectable 25 Gram(s) IV Push once  dextrose 50% Injectable 25 Gram(s) IV Push once  enoxaparin Injectable 100 milliGRAM(s) SubCutaneous <User Schedule>  folic acid 1 milliGRAM(s) Oral daily  glucagon  Injectable 1 milliGRAM(s) IntraMuscular once  insulin lispro (ADMELOG) corrective regimen sliding scale   SubCutaneous every 6 hours  insulin NPH human recombinant 6 Unit(s) SubCutaneous every 6 hours  methylPREDNISolone sodium succinate Injectable 80 milliGRAM(s) IV Push daily  metoprolol tartrate 25 milliGRAM(s) Oral two times a day  midodrine 20 milliGRAM(s) Oral every 8 hours  multivitamin/minerals/iron Oral Solution (CENTRUM) 15 milliLiter(s) Oral daily  oxymetazoline 0.05% Nasal Spray 1 Spray(s) Both Nostrils every 12 hours  pantoprazole  Injectable 40 milliGRAM(s) IV Push daily  senna Syrup 10 milliLiter(s) Oral at bedtime  vancomycin  IVPB      vancomycin  IVPB 1500 milliGRAM(s) IV Intermittent every 12 hours    MEDICATIONS  (PRN):  dextrose Oral Gel 15 Gram(s) Oral once PRN Blood Glucose LESS THAN 70 milliGRAM(s)/deciliter  sodium chloride 0.65% Nasal 1 Spray(s) Both Nostrils every 8 hours PRN Nasal Congestion  tranexamic acid Injectable for Nebulization 500 milliGRAM(s) Inhalation every 8 hours PRN Nose bleed      Allergies    No Known Allergies    Intolerances        SOCIAL HISTORY:  Lives with spouse Kiara. 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  Per records no etoh, smoking, illicit drug use.        FAMILY HISTORY:      PHYSICAL EXAM:  Vital Signs Last 24 Hrs  T(C): 37.1 (29 Sep 2023 08:00), Max: 37.1 (29 Sep 2023 04:00)  T(F): 98.7 (29 Sep 2023 08:00), Max: 98.7 (29 Sep 2023 04:00)  HR: 62 (29 Sep 2023 12:00) (62 - 79)  BP: 122/61 (29 Sep 2023 12:00) (101/45 - 127/65)  BP(mean): 76 (29 Sep 2023 12:00) (57 - 82)  RR: 35 (29 Sep 2023 12:00) (17 - 35)  SpO2: 100% (29 Sep 2023 12:00) (93% - 100%)    Parameters below as of 29 Sep 2023 10:25  Patient On (Oxygen Delivery Method): face tent    Wt: 120.7kg (09-29-23)    Constitutional: Lethargic, sitting in recliner chair, minimally verbal due to lethargy.  Obese, chair/bedbound. Requires full assist with positioning and transferring.  (+) low airloss support surface, (+) fluidized positioning devices, heels elevated  HEENT:  NC/AT, (+) glasses, (+) sclera jaundiced, nasal (L>R) and oral mucosa pink friable; right nare NGT- skin intact. medical team aware.   Right neck with intact sutures; previous cannulation site.  Cardiovascular: rate regular  Respiratory: supplemental oxygen via face tent, tachypneic, equal chest expansion   Gastrointestinal: soft, nondistended, (+) tenderness with palpation to bilateral upper quadrants. No palpable abnormalities noted.  Large abdominal pannus, increased moisture beneath intertriginous fold; scattered areas of ecchymosis without hematoma; see skin.  Musculoskeletal: weak, no deformities or contractures.  Vascular: BL UE equaly warm, capillary refill < 3 seconds, +2 radial pulses.  BLE equally warm, capillary refill < 3 seconds, trace edema bilaterally, +2 dp pulses.  Skin: b/l ue with scattered areas of ecchymosis without hematoma.  Increased moisture within intertriginous folds including bilateral submammary, abdominal pannus, bilateral groin.  Bilateral submammary and abdominal pannus skin intact. Bilateral groin see below.  Right groin- previous cannulation site  -Superficial wound within intertriginous fold of groin exposed to increased moisture  -2.5cmx8.5cmx0.3cm  -Tissue type 100% tan-grey adherent slough versus biofilm; 1 suture in place.  -Area nonfluctuant, no induration, no crepitus, no drainage, no palpable drainable collection.  -Periwound skin without erythema, edema, increased warmth/no associated cellulitis; no obvious s/s of candidiasis.  -No drainage noted; no culture taken  Left groin- moisture associated dermatitis with denuded epidermis  -Superficial wound within intertriginous fold of groin exposed to increased moisture  -0.7lby8qfw2.2cm  -Tissue type 100% pink dermis with thin layer of translucent yellow-fibrinous film.  -Area nonfluctuant, no induration, no crepitus, no drainage, no palpable drainable collection.  -Periwound skin without erythema, edema, increased warmth/no associated cellulitis; no obvious s/s of candidiasis.  -No drainage noted; no culture taken  **Of note skin exam limited; unable to asses posterior trunk, sacrum, buttocks; sitting in recliner chair, unable to bear weight to stand for exam; unable to safely turn for assessment while in chair.      LABS/ CULTURES/ RADIOLOGY:                        10.3   23.15 )-----------( 251      ( 29 Sep 2023 00:25 )             32.3       136  |  97  |  34  ----------------------------<  126      [09-29-23 @ 00:25]  4.1   |  29  |  0.45        Ca     9.7     [09-29-23 @ 00:25]      Mg     2.20     [09-29-23 @ 00:25]      Phos  2.4     [09-29-23 @ 00:25]    TPro  4.7  /  Alb  3.0  /  TBili  12.2  /  DBili  x   /  AST  110  /  ALT  206  /  AlkPhos  682  [09-29-23 @ 00:25]    PT/INR: PT 12.1 , INR 1.08       [09-29-23 @ 00:25]  PTT: 36.2       [09-29-23 @ 00:25]          Culture - Blood (collected 09-27-23 @ 19:50)  Source: .Blood Blood-Peripheral  Preliminary Report (09-29-23 @ 01:02):    No growth at 24 hours    Culture - Blood (collected 09-27-23 @ 14:30)  Source: .Blood Blood-Peripheral  Preliminary Report (09-28-23 @ 20:02):    No growth at 24 hours

## 2023-09-29 NOTE — SWALLOW VFSS/MBS ASSESSMENT ADULT - COMMENTS
Per discussion with ECMO team; patient is not medically optimized at this time for Cinesophagram. Team to reconsult this service when patient becomes medically optimized.

## 2023-09-29 NOTE — PROGRESS NOTE ADULT - SUBJECTIVE AND OBJECTIVE BOX
CHIEF COMPLAINT:    Interval Events:      REVIEW OF SYSTEMS:  Constitutional: [ ] negative [ ] fevers [ ] chills [ ] weight loss [ ] weight gain  HEENT: [ ] negative [ ] dry eyes [ ] eye irritation [ ] postnasal drip [ ] nasal congestion  CV: [ ] negative  [ ] chest pain [ ] orthopnea [ ] palpitations [ ] murmur  Resp: [ ] negative [ ] cough [ ] shortness of breath [ ] dyspnea [ ] wheezing [ ] sputum [ ] hemoptysis  GI: [ ] negative [ ] nausea [ ] vomiting [ ] diarrhea [ ] constipation [ ] abd pain [ ] dysphagia   : [ ] negative [ ] dysuria [ ] nocturia [ ] hematuria [ ] increased urinary frequency  Musculoskeletal: [ ] negative [ ] back pain [ ] myalgias [ ] arthralgias [ ] fracture  Skin: [ ] negative [ ] rash [ ] itch  Neurological: [ ] negative [ ] headache [ ] dizziness [ ] syncope [ ] weakness [ ] numbness  Psychiatric: [ ] negative [ ] anxiety [ ] depression  Endocrine: [ ] negative [ ] diabetes [ ] thyroid problem  Hematologic/Lymphatic: [ ] negative [ ] anemia [ ] bleeding problem  Allergic/Immunologic: [ ] negative [ ] itchy eyes [ ] nasal discharge [ ] hives [ ] angioedema  [ ] All other systems negative  [ ] Unable to assess ROS because ________    OBJECTIVE:  ICU Vital Signs Last 24 Hrs  T(C): 37.1 (29 Sep 2023 04:00), Max: 37.1 (29 Sep 2023 04:00)  T(F): 98.7 (29 Sep 2023 04:00), Max: 98.7 (29 Sep 2023 04:00)  HR: 78 (29 Sep 2023 04:00) (66 - 93)  BP: 108/56 (29 Sep 2023 04:00) (101/45 - 121/71)  BP(mean): 67 (29 Sep 2023 04:00) (57 - 81)  ABP: --  ABP(mean): --  RR: 30 (29 Sep 2023 04:00) (17 - 37)  SpO2: 94% (29 Sep 2023 04:00) (92% - 100%)    O2 Parameters below as of 29 Sep 2023 04:00  Patient On (Oxygen Delivery Method): face tent    O2 Concentration (%): 50          09-27 @ 07:01  -  09-28 @ 07:00  --------------------------------------------------------  IN: 1845 mL / OUT: 3260 mL / NET: -1415 mL    09-28 @ 07:01 - 09-29 @ 06:01  --------------------------------------------------------  IN: 1205 mL / OUT: 1150 mL / NET: 55 mL      CAPILLARY BLOOD GLUCOSE      POCT Blood Glucose.: 114 mg/dL (29 Sep 2023 05:39)      Physical Exam:   Constitutional: no acute distress   HEENT: + PERRLA, EOMI, no drainage or redness  Neck: supple,  No JVD  Respiratory: diminshed breath Sounds equal & clear bilaterally to auscultation, no accessory muscle use noted  Cardiovascular: Regular rate, regular rhythm, normal S1, S2; no murmurs or rub  Gastrointestinal: Soft, non-tender, non distended, no hepatosplenomegaly, normal bowel sounds  Extremities: + 2 peripheral edema, no cyanosis, no clubbing   Vascular: Equal and normal pulses: 2+ peripheral pulses throughout  Neurological: alert and oriented   Psychiatric: calm, normal mood, normal affect  Musculoskeletal: No joint swelling or deformity; no limitation of movement  Skin: warm, dry, well perfused, no rashes    HOSPITAL MEDICATIONS:  Standing Meds:  albuterol/ipratropium for Nebulization 3 milliLiter(s) Nebulizer every 6 hours  artificial  tears Solution 1 Drop(s) Both EYES every 12 hours  atovaquone  Suspension 1500 milliGRAM(s) Oral daily  cefTRIAXone   IVPB 1000 milliGRAM(s) IV Intermittent every 24 hours  chlorhexidine 2% Cloths 1 Application(s) Topical <User Schedule>  dextrose 5%. 1000 milliLiter(s) IV Continuous <Continuous>  dextrose 50% Injectable 12.5 Gram(s) IV Push once  dextrose 50% Injectable 25 Gram(s) IV Push once  dextrose 50% Injectable 25 Gram(s) IV Push once  enoxaparin Injectable 100 milliGRAM(s) SubCutaneous <User Schedule>  folic acid 1 milliGRAM(s) Oral daily  glucagon  Injectable 1 milliGRAM(s) IntraMuscular once  insulin lispro (ADMELOG) corrective regimen sliding scale   SubCutaneous every 6 hours  insulin NPH human recombinant 6 Unit(s) SubCutaneous every 6 hours  methylPREDNISolone sodium succinate Injectable 80 milliGRAM(s) IV Push daily  metoprolol tartrate 25 milliGRAM(s) Oral two times a day  midodrine 20 milliGRAM(s) Oral every 8 hours  multivitamin/minerals/iron Oral Solution (CENTRUM) 15 milliLiter(s) Oral daily  oxymetazoline 0.05% Nasal Spray 1 Spray(s) Both Nostrils every 12 hours  pantoprazole  Injectable 40 milliGRAM(s) IV Push daily  psyllium Powder 1 Packet(s) Oral daily  senna Syrup 10 milliLiter(s) Oral at bedtime      PRN Meds:  dextrose Oral Gel 15 Gram(s) Oral once PRN  sodium chloride 0.65% Nasal 1 Spray(s) Both Nostrils every 8 hours PRN  tranexamic acid Injectable for Nebulization 500 milliGRAM(s) Inhalation every 8 hours PRN      LABS:                        10.3   23.15 )-----------( 251      ( 29 Sep 2023 00:25 )             32.3     Hgb Trend: 10.3<--, 10.6<--, 11.6<--, 10.4<--, 10.4<--  09-29    136  |  97<L>  |  34<H>  ----------------------------<  126<H>  4.1   |  29  |  0.45<L>    Ca    9.7      29 Sep 2023 00:25  Phos  2.7     09-28  Mg     2.20     09-29    TPro  4.7<L>  /  Alb  3.0<L>  /  TBili  12.2<H>  /  DBili  x   /  AST  110<H>  /  ALT  206<H>  /  AlkPhos  682<H>  09-29    Creatinine Trend: 0.45<--, 0.46<--, 0.44<--, 0.43<--, 0.49<--, 0.50<--  PT/INR - ( 29 Sep 2023 00:25 )   PT: 12.1 sec;   INR: 1.08 ratio         PTT - ( 29 Sep 2023 00:25 )  PTT:36.2 sec  Urinalysis Basic - ( 29 Sep 2023 00:25 )    Color: x / Appearance: x / SG: x / pH: x  Gluc: 126 mg/dL / Ketone: x  / Bili: x / Urobili: x   Blood: x / Protein: x / Nitrite: x   Leuk Esterase: x / RBC: x / WBC x   Sq Epi: x / Non Sq Epi: x / Bacteria: x      Arterial Blood Gas:  09-27 @ 17:30  7.44/45/92/31/97.1/5.7  ABG lactate: --  Arterial Blood Gas:  09-27 @ 14:23  7.49/39/70/30/94.0/5.9  ABG lactate: --    Venous Blood Gas:  09-29 @ 00:25  7.47/48/45/35/74.4  VBG Lactate: 1.1  Venous Blood Gas:  09-28 @ 02:30  7.48/46/83/34/97.2  VBG Lactate: 1.2      MICROBIOLOGY:     Culture - Blood (collected 27 Sep 2023 19:50)  Source: .Blood Blood-Peripheral  Preliminary Report (29 Sep 2023 01:02):    No growth at 24 hours    Culture - Urine (collected 27 Sep 2023 18:26)  Source: Catheterized Catheterized  Final Report (28 Sep 2023 21:25):    No growth    Culture - Blood (collected 27 Sep 2023 14:30)  Source: .Blood Blood-Peripheral  Preliminary Report (28 Sep 2023 20:02):    No growth at 24 hours           Interval Events:   -no acute events reported overnight       REVIEW OF SYSTEMS:  Constitutional: [ ] negative [ ] fevers [ ] chills [ ] weight loss [ ] weight gain  HEENT: [ ] negative [ ] dry eyes [ ] eye irritation [ ] postnasal drip [x ] nasal congestion  CV: [ ] negative  [ ] chest pain [ ] orthopnea [ ] palpitations [ ] murmur  Resp: [ ] negative [ ] cough [ ] shortness of breath [ ] dyspnea [ ] wheezing [ ] sputum [ ] hemoptysis  GI: [ ] negative [ ] nausea [ ] vomiting [ ] diarrhea [ ] constipation [ ] abd pain [ ] dysphagia   : [ ] negative [ ] dysuria [ ] nocturia [ ] hematuria [ ] increased urinary frequency  Musculoskeletal: [ ] negative [ ] back pain [ ] myalgias [ ] arthralgias [ ] fracture  Skin: [ ] negative [ ] rash [ ] itch  Neurological: [ ] negative [ ] headache [ ] dizziness [ ] syncope [ ] weakness [ ] numbness  Psychiatric: [ ] negative [ ] anxiety [ ] depression  Endocrine: [ ] negative [ ] diabetes [ ] thyroid problem  Hematologic/Lymphatic: [ ] negative [ ] anemia [ ] bleeding problem  Allergic/Immunologic: [ ] negative [ ] itchy eyes [ ] nasal discharge [ ] hives [ ] angioedema  [x ] All other systems negative  [ ] Unable to assess ROS because ________    OBJECTIVE:  ICU Vital Signs Last 24 Hrs  T(C): 37.1 (29 Sep 2023 04:00), Max: 37.1 (29 Sep 2023 04:00)  T(F): 98.7 (29 Sep 2023 04:00), Max: 98.7 (29 Sep 2023 04:00)  HR: 78 (29 Sep 2023 04:00) (66 - 93)  BP: 108/56 (29 Sep 2023 04:00) (101/45 - 121/71)  BP(mean): 67 (29 Sep 2023 04:00) (57 - 81)  ABP: --  ABP(mean): --  RR: 30 (29 Sep 2023 04:00) (17 - 37)  SpO2: 94% (29 Sep 2023 04:00) (92% - 100%)    O2 Parameters below as of 29 Sep 2023 04:00  Patient On (Oxygen Delivery Method): face tent    O2 Concentration (%): 50          09-27 @ 07:01  -  09-28 @ 07:00  --------------------------------------------------------  IN: 1845 mL / OUT: 3260 mL / NET: -1415 mL    09-28 @ 07:01 - 09-29 @ 06:01  --------------------------------------------------------  IN: 1205 mL / OUT: 1150 mL / NET: 55 mL      CAPILLARY BLOOD GLUCOSE      POCT Blood Glucose.: 114 mg/dL (29 Sep 2023 05:39)      Physical Exam:   Constitutional: no acute distress   HEENT: + PERRLA, EOMI, no drainage or redness, dry blood noted to Left nare   Neck: supple,  No JVD  Respiratory: diminished breath Sounds equal & clear bilaterally to auscultation, no accessory muscle use noted  Cardiovascular: Regular rate, regular rhythm, normal S1, S2; no murmurs or rub  Gastrointestinal: obese, soft, non-tender, non distended, no hepatosplenomegaly, normal bowel sounds  Extremities: + 2 peripheral edema to lower extremities B/L, no cyanosis, no clubbing   Vascular: Equal and normal pulses: 2+ peripheral pulses throughout  Neurological: alert and oriented   Psychiatric: calm  Musculoskeletal: No joint swelling or deformity; no limitation of movement  Skin: warm, dry, well perfused, no rashes, right groin wound     HOSPITAL MEDICATIONS:  Standing Meds:  albuterol/ipratropium for Nebulization 3 milliLiter(s) Nebulizer every 6 hours  artificial  tears Solution 1 Drop(s) Both EYES every 12 hours  atovaquone  Suspension 1500 milliGRAM(s) Oral daily  cefTRIAXone   IVPB 1000 milliGRAM(s) IV Intermittent every 24 hours  chlorhexidine 2% Cloths 1 Application(s) Topical <User Schedule>  dextrose 5%. 1000 milliLiter(s) IV Continuous <Continuous>  dextrose 50% Injectable 12.5 Gram(s) IV Push once  dextrose 50% Injectable 25 Gram(s) IV Push once  dextrose 50% Injectable 25 Gram(s) IV Push once  enoxaparin Injectable 100 milliGRAM(s) SubCutaneous <User Schedule>  folic acid 1 milliGRAM(s) Oral daily  glucagon  Injectable 1 milliGRAM(s) IntraMuscular once  insulin lispro (ADMELOG) corrective regimen sliding scale   SubCutaneous every 6 hours  insulin NPH human recombinant 6 Unit(s) SubCutaneous every 6 hours  methylPREDNISolone sodium succinate Injectable 80 milliGRAM(s) IV Push daily  metoprolol tartrate 25 milliGRAM(s) Oral two times a day  midodrine 20 milliGRAM(s) Oral every 8 hours  multivitamin/minerals/iron Oral Solution (CENTRUM) 15 milliLiter(s) Oral daily  oxymetazoline 0.05% Nasal Spray 1 Spray(s) Both Nostrils every 12 hours  pantoprazole  Injectable 40 milliGRAM(s) IV Push daily  psyllium Powder 1 Packet(s) Oral daily  senna Syrup 10 milliLiter(s) Oral at bedtime      PRN Meds:  dextrose Oral Gel 15 Gram(s) Oral once PRN  sodium chloride 0.65% Nasal 1 Spray(s) Both Nostrils every 8 hours PRN  tranexamic acid Injectable for Nebulization 500 milliGRAM(s) Inhalation every 8 hours PRN      LABS:                        10.3   23.15 )-----------( 251      ( 29 Sep 2023 00:25 )             32.3     Hgb Trend: 10.3<--, 10.6<--, 11.6<--, 10.4<--, 10.4<--  09-29    136  |  97<L>  |  34<H>  ----------------------------<  126<H>  4.1   |  29  |  0.45<L>    Ca    9.7      29 Sep 2023 00:25  Phos  2.7     09-28  Mg     2.20     09-29    TPro  4.7<L>  /  Alb  3.0<L>  /  TBili  12.2<H>  /  DBili  x   /  AST  110<H>  /  ALT  206<H>  /  AlkPhos  682<H>  09-29    Creatinine Trend: 0.45<--, 0.46<--, 0.44<--, 0.43<--, 0.49<--, 0.50<--  PT/INR - ( 29 Sep 2023 00:25 )   PT: 12.1 sec;   INR: 1.08 ratio         PTT - ( 29 Sep 2023 00:25 )  PTT:36.2 sec  Urinalysis Basic - ( 29 Sep 2023 00:25 )    Color: x / Appearance: x / SG: x / pH: x  Gluc: 126 mg/dL / Ketone: x  / Bili: x / Urobili: x   Blood: x / Protein: x / Nitrite: x   Leuk Esterase: x / RBC: x / WBC x   Sq Epi: x / Non Sq Epi: x / Bacteria: x      Arterial Blood Gas:  09-27 @ 17:30  7.44/45/92/31/97.1/5.7  ABG lactate: --  Arterial Blood Gas:  09-27 @ 14:23  7.49/39/70/30/94.0/5.9  ABG lactate: --    Venous Blood Gas:  09-29 @ 00:25  7.47/48/45/35/74.4  VBG Lactate: 1.1  Venous Blood Gas:  09-28 @ 02:30  7.48/46/83/34/97.2  VBG Lactate: 1.2      MICROBIOLOGY:     Culture - Blood (collected 27 Sep 2023 19:50)  Source: .Blood Blood-Peripheral  Preliminary Report (29 Sep 2023 01:02):    No growth at 24 hours    Culture - Urine (collected 27 Sep 2023 18:26)  Source: Catheterized Catheterized  Final Report (28 Sep 2023 21:25):    No growth    Culture - Blood (collected 27 Sep 2023 14:30)  Source: .Blood Blood-Peripheral  Preliminary Report (28 Sep 2023 20:02):    No growth at 24 hours

## 2023-09-30 LAB
ALBUMIN SERPL ELPH-MCNC: 2.8 G/DL — LOW (ref 3.3–5)
ALP SERPL-CCNC: 771 U/L — HIGH (ref 40–120)
ALT FLD-CCNC: 209 U/L — HIGH (ref 4–33)
ANION GAP SERPL CALC-SCNC: 9 MMOL/L — SIGNIFICANT CHANGE UP (ref 7–14)
AST SERPL-CCNC: 141 U/L — HIGH (ref 4–32)
B PERT DNA SPEC QL NAA+PROBE: SIGNIFICANT CHANGE UP
B PERT+PARAPERT DNA PNL SPEC NAA+PROBE: SIGNIFICANT CHANGE UP
BASE EXCESS BLDV CALC-SCNC: 10.5 MMOL/L — HIGH (ref -2–3)
BASE EXCESS BLDV CALC-SCNC: 6.7 MMOL/L — HIGH (ref -2–3)
BASOPHILS # BLD AUTO: 0.03 K/UL — SIGNIFICANT CHANGE UP (ref 0–0.2)
BASOPHILS # BLD AUTO: 0.19 K/UL — SIGNIFICANT CHANGE UP (ref 0–0.2)
BASOPHILS NFR BLD AUTO: 0.1 % — SIGNIFICANT CHANGE UP (ref 0–2)
BASOPHILS NFR BLD AUTO: 0.7 % — SIGNIFICANT CHANGE UP (ref 0–2)
BILIRUB SERPL-MCNC: 11.7 MG/DL — HIGH (ref 0.2–1.2)
BLOOD GAS VENOUS COMPREHENSIVE RESULT: SIGNIFICANT CHANGE UP
BLOOD GAS VENOUS COMPREHENSIVE RESULT: SIGNIFICANT CHANGE UP
BORDETELLA PARAPERTUSSIS (RAPRVP): SIGNIFICANT CHANGE UP
BUN SERPL-MCNC: 36 MG/DL — HIGH (ref 7–23)
C PNEUM DNA SPEC QL NAA+PROBE: SIGNIFICANT CHANGE UP
CALCIUM SERPL-MCNC: 9.4 MG/DL — SIGNIFICANT CHANGE UP (ref 8.4–10.5)
CHLORIDE BLDV-SCNC: 100 MMOL/L — SIGNIFICANT CHANGE UP (ref 96–108)
CHLORIDE BLDV-SCNC: 102 MMOL/L — SIGNIFICANT CHANGE UP (ref 96–108)
CHLORIDE SERPL-SCNC: 99 MMOL/L — SIGNIFICANT CHANGE UP (ref 98–107)
CO2 BLDV-SCNC: 31.6 MMOL/L — HIGH (ref 22–26)
CO2 BLDV-SCNC: 36.5 MMOL/L — HIGH (ref 22–26)
CO2 SERPL-SCNC: 28 MMOL/L — SIGNIFICANT CHANGE UP (ref 22–31)
CREAT SERPL-MCNC: 0.44 MG/DL — LOW (ref 0.5–1.3)
CULTURE RESULTS: SIGNIFICANT CHANGE UP
EGFR: 108 ML/MIN/1.73M2 — SIGNIFICANT CHANGE UP
EOSINOPHIL # BLD AUTO: 0.01 K/UL — SIGNIFICANT CHANGE UP (ref 0–0.5)
EOSINOPHIL # BLD AUTO: 0.01 K/UL — SIGNIFICANT CHANGE UP (ref 0–0.5)
EOSINOPHIL NFR BLD AUTO: 0 % — SIGNIFICANT CHANGE UP (ref 0–6)
EOSINOPHIL NFR BLD AUTO: 0 % — SIGNIFICANT CHANGE UP (ref 0–6)
FLUAV SUBTYP SPEC NAA+PROBE: SIGNIFICANT CHANGE UP
FLUBV RNA SPEC QL NAA+PROBE: SIGNIFICANT CHANGE UP
GAS PNL BLDV: 134 MMOL/L — LOW (ref 136–145)
GAS PNL BLDV: 135 MMOL/L — LOW (ref 136–145)
GLUCOSE BLDC GLUCOMTR-MCNC: 129 MG/DL — HIGH (ref 70–99)
GLUCOSE BLDC GLUCOMTR-MCNC: 143 MG/DL — HIGH (ref 70–99)
GLUCOSE BLDC GLUCOMTR-MCNC: 151 MG/DL — HIGH (ref 70–99)
GLUCOSE BLDC GLUCOMTR-MCNC: 188 MG/DL — HIGH (ref 70–99)
GLUCOSE BLDC GLUCOMTR-MCNC: 260 MG/DL — HIGH (ref 70–99)
GLUCOSE BLDV-MCNC: 122 MG/DL — HIGH (ref 70–99)
GLUCOSE BLDV-MCNC: 149 MG/DL — HIGH (ref 70–99)
GLUCOSE SERPL-MCNC: 150 MG/DL — HIGH (ref 70–99)
HADV DNA SPEC QL NAA+PROBE: SIGNIFICANT CHANGE UP
HCO3 BLDV-SCNC: 30 MMOL/L — HIGH (ref 22–29)
HCO3 BLDV-SCNC: 35 MMOL/L — HIGH (ref 22–29)
HCOV 229E RNA SPEC QL NAA+PROBE: SIGNIFICANT CHANGE UP
HCOV HKU1 RNA SPEC QL NAA+PROBE: SIGNIFICANT CHANGE UP
HCOV NL63 RNA SPEC QL NAA+PROBE: SIGNIFICANT CHANGE UP
HCOV OC43 RNA SPEC QL NAA+PROBE: SIGNIFICANT CHANGE UP
HCT VFR BLD CALC: 31.2 % — LOW (ref 34.5–45)
HCT VFR BLD CALC: 32.5 % — LOW (ref 34.5–45)
HCT VFR BLDA CALC: 31 % — LOW (ref 34.5–46.5)
HCT VFR BLDA CALC: 32 % — LOW (ref 34.5–46.5)
HGB BLD CALC-MCNC: 10.4 G/DL — LOW (ref 11.7–16.1)
HGB BLD CALC-MCNC: 10.7 G/DL — LOW (ref 11.7–16.1)
HGB BLD-MCNC: 10.1 G/DL — LOW (ref 11.5–15.5)
HGB BLD-MCNC: 10.5 G/DL — LOW (ref 11.5–15.5)
HMPV RNA SPEC QL NAA+PROBE: SIGNIFICANT CHANGE UP
HPIV1 RNA SPEC QL NAA+PROBE: SIGNIFICANT CHANGE UP
HPIV2 RNA SPEC QL NAA+PROBE: SIGNIFICANT CHANGE UP
HPIV3 RNA SPEC QL NAA+PROBE: SIGNIFICANT CHANGE UP
HPIV4 RNA SPEC QL NAA+PROBE: SIGNIFICANT CHANGE UP
IANC: 23.58 K/UL — HIGH (ref 1.8–7.4)
IANC: 25.2 K/UL — HIGH (ref 1.8–7.4)
IMM GRANULOCYTES NFR BLD AUTO: 4.9 % — HIGH (ref 0–0.9)
IMM GRANULOCYTES NFR BLD AUTO: 5.7 % — HIGH (ref 0–0.9)
LACTATE BLDV-MCNC: 1.5 MMOL/L — SIGNIFICANT CHANGE UP (ref 0.5–2)
LACTATE BLDV-MCNC: 2.1 MMOL/L — HIGH (ref 0.5–2)
LYMPHOCYTES # BLD AUTO: 0.36 K/UL — LOW (ref 1–3.3)
LYMPHOCYTES # BLD AUTO: 0.4 K/UL — LOW (ref 1–3.3)
LYMPHOCYTES # BLD AUTO: 1.4 % — LOW (ref 13–44)
LYMPHOCYTES # BLD AUTO: 1.4 % — LOW (ref 13–44)
M PNEUMO DNA SPEC QL NAA+PROBE: SIGNIFICANT CHANGE UP
MAGNESIUM SERPL-MCNC: 2.2 MG/DL — SIGNIFICANT CHANGE UP (ref 1.6–2.6)
MCHC RBC-ENTMCNC: 32 PG — SIGNIFICANT CHANGE UP (ref 27–34)
MCHC RBC-ENTMCNC: 32.3 GM/DL — SIGNIFICANT CHANGE UP (ref 32–36)
MCHC RBC-ENTMCNC: 32.4 GM/DL — SIGNIFICANT CHANGE UP (ref 32–36)
MCHC RBC-ENTMCNC: 32.4 PG — SIGNIFICANT CHANGE UP (ref 27–34)
MCV RBC AUTO: 100.3 FL — HIGH (ref 80–100)
MCV RBC AUTO: 98.7 FL — SIGNIFICANT CHANGE UP (ref 80–100)
MONOCYTES # BLD AUTO: 0.56 K/UL — SIGNIFICANT CHANGE UP (ref 0–0.9)
MONOCYTES # BLD AUTO: 0.82 K/UL — SIGNIFICANT CHANGE UP (ref 0–0.9)
MONOCYTES NFR BLD AUTO: 2 % — SIGNIFICANT CHANGE UP (ref 2–14)
MONOCYTES NFR BLD AUTO: 3.1 % — SIGNIFICANT CHANGE UP (ref 2–14)
NEUTROPHILS # BLD AUTO: 23.58 K/UL — HIGH (ref 1.8–7.4)
NEUTROPHILS # BLD AUTO: 25.2 K/UL — HIGH (ref 1.8–7.4)
NEUTROPHILS NFR BLD AUTO: 90.2 % — HIGH (ref 43–77)
NEUTROPHILS NFR BLD AUTO: 90.5 % — HIGH (ref 43–77)
NRBC # BLD: 0 /100 WBCS — SIGNIFICANT CHANGE UP (ref 0–0)
NRBC # BLD: 0 /100 WBCS — SIGNIFICANT CHANGE UP (ref 0–0)
NRBC # FLD: 0.08 K/UL — HIGH (ref 0–0)
NRBC # FLD: 0.11 K/UL — HIGH (ref 0–0)
PCO2 BLDV: 39 MMHG — SIGNIFICANT CHANGE UP (ref 39–52)
PCO2 BLDV: 46 MMHG — SIGNIFICANT CHANGE UP (ref 39–52)
PH BLDV: 7.49 — HIGH (ref 7.32–7.43)
PH BLDV: 7.5 — HIGH (ref 7.32–7.43)
PHOSPHATE SERPL-MCNC: 2.3 MG/DL — LOW (ref 2.5–4.5)
PLATELET # BLD AUTO: 299 K/UL — SIGNIFICANT CHANGE UP (ref 150–400)
PLATELET # BLD AUTO: 343 K/UL — SIGNIFICANT CHANGE UP (ref 150–400)
PO2 BLDV: 101 MMHG — HIGH (ref 25–45)
PO2 BLDV: 65 MMHG — HIGH (ref 25–45)
POTASSIUM BLDV-SCNC: 4.3 MMOL/L — SIGNIFICANT CHANGE UP (ref 3.5–5.1)
POTASSIUM BLDV-SCNC: 5.9 MMOL/L — HIGH (ref 3.5–5.1)
POTASSIUM SERPL-MCNC: 4.2 MMOL/L — SIGNIFICANT CHANGE UP (ref 3.5–5.3)
POTASSIUM SERPL-SCNC: 4.2 MMOL/L — SIGNIFICANT CHANGE UP (ref 3.5–5.3)
PROT SERPL-MCNC: 5.2 G/DL — LOW (ref 6–8.3)
RAPID RVP RESULT: SIGNIFICANT CHANGE UP
RBC # BLD: 3.16 M/UL — LOW (ref 3.8–5.2)
RBC # BLD: 3.24 M/UL — LOW (ref 3.8–5.2)
RBC # FLD: 25 % — HIGH (ref 10.3–14.5)
RBC # FLD: 25.6 % — HIGH (ref 10.3–14.5)
RSV RNA SPEC QL NAA+PROBE: SIGNIFICANT CHANGE UP
RV+EV RNA SPEC QL NAA+PROBE: SIGNIFICANT CHANGE UP
SAO2 % BLDV: 92.1 % — HIGH (ref 67–88)
SAO2 % BLDV: 98.8 % — HIGH (ref 67–88)
SARS-COV-2 RNA SPEC QL NAA+PROBE: SIGNIFICANT CHANGE UP
SODIUM SERPL-SCNC: 136 MMOL/L — SIGNIFICANT CHANGE UP (ref 135–145)
SPECIMEN SOURCE: SIGNIFICANT CHANGE UP
WBC # BLD: 26.09 K/UL — HIGH (ref 3.8–10.5)
WBC # BLD: 27.96 K/UL — HIGH (ref 3.8–10.5)
WBC # FLD AUTO: 26.09 K/UL — HIGH (ref 3.8–10.5)
WBC # FLD AUTO: 27.96 K/UL — HIGH (ref 3.8–10.5)

## 2023-09-30 PROCEDURE — 99291 CRITICAL CARE FIRST HOUR: CPT

## 2023-09-30 PROCEDURE — 74177 CT ABD & PELVIS W/CONTRAST: CPT | Mod: 26

## 2023-09-30 PROCEDURE — 71260 CT THORAX DX C+: CPT | Mod: 26

## 2023-09-30 RX ORDER — QUETIAPINE FUMARATE 200 MG/1
12.5 TABLET, FILM COATED ORAL AT BEDTIME
Refills: 0 | Status: DISCONTINUED | OUTPATIENT
Start: 2023-09-30 | End: 2023-10-01

## 2023-09-30 RX ORDER — SODIUM CHLORIDE 9 MG/ML
4 INJECTION INTRAMUSCULAR; INTRAVENOUS; SUBCUTANEOUS ONCE
Refills: 0 | Status: COMPLETED | OUTPATIENT
Start: 2023-09-30 | End: 2023-09-30

## 2023-09-30 RX ORDER — POTASSIUM PHOSPHATE, MONOBASIC POTASSIUM PHOSPHATE, DIBASIC 236; 224 MG/ML; MG/ML
15 INJECTION, SOLUTION INTRAVENOUS ONCE
Refills: 0 | Status: COMPLETED | OUTPATIENT
Start: 2023-09-30 | End: 2023-09-30

## 2023-09-30 RX ORDER — FUROSEMIDE 40 MG
20 TABLET ORAL ONCE
Refills: 0 | Status: COMPLETED | OUTPATIENT
Start: 2023-09-30 | End: 2023-09-30

## 2023-09-30 RX ADMIN — MIDODRINE HYDROCHLORIDE 20 MILLIGRAM(S): 2.5 TABLET ORAL at 06:21

## 2023-09-30 RX ADMIN — Medication 2: at 01:14

## 2023-09-30 RX ADMIN — Medication 3 MILLILITER(S): at 16:55

## 2023-09-30 RX ADMIN — Medication 15 MILLILITER(S): at 12:03

## 2023-09-30 RX ADMIN — OXYMETAZOLINE HYDROCHLORIDE 1 SPRAY(S): 0.5 SPRAY NASAL at 06:22

## 2023-09-30 RX ADMIN — TRANEXAMIC ACID 500 MILLIGRAM(S): 100 INJECTION, SOLUTION INTRAVENOUS at 04:02

## 2023-09-30 RX ADMIN — ENOXAPARIN SODIUM 100 MILLIGRAM(S): 100 INJECTION SUBCUTANEOUS at 09:19

## 2023-09-30 RX ADMIN — Medication 3 MILLILITER(S): at 10:47

## 2023-09-30 RX ADMIN — Medication 3 MILLILITER(S): at 03:44

## 2023-09-30 RX ADMIN — MIDODRINE HYDROCHLORIDE 20 MILLIGRAM(S): 2.5 TABLET ORAL at 23:08

## 2023-09-30 RX ADMIN — HUMAN INSULIN 6 UNIT(S): 100 INJECTION, SUSPENSION SUBCUTANEOUS at 06:21

## 2023-09-30 RX ADMIN — SODIUM CHLORIDE 4 MILLILITER(S): 9 INJECTION INTRAMUSCULAR; INTRAVENOUS; SUBCUTANEOUS at 04:03

## 2023-09-30 RX ADMIN — Medication 20 MILLIGRAM(S): at 12:03

## 2023-09-30 RX ADMIN — HUMAN INSULIN 6 UNIT(S): 100 INJECTION, SUSPENSION SUBCUTANEOUS at 17:35

## 2023-09-30 RX ADMIN — Medication 1 DROP(S): at 17:27

## 2023-09-30 RX ADMIN — Medication 1 DROP(S): at 06:22

## 2023-09-30 RX ADMIN — ENOXAPARIN SODIUM 100 MILLIGRAM(S): 100 INJECTION SUBCUTANEOUS at 21:04

## 2023-09-30 RX ADMIN — POTASSIUM PHOSPHATE, MONOBASIC POTASSIUM PHOSPHATE, DIBASIC 62.5 MILLIMOLE(S): 236; 224 INJECTION, SOLUTION INTRAVENOUS at 07:49

## 2023-09-30 RX ADMIN — HUMAN INSULIN 6 UNIT(S): 100 INJECTION, SUSPENSION SUBCUTANEOUS at 00:54

## 2023-09-30 RX ADMIN — Medication 6: at 12:06

## 2023-09-30 RX ADMIN — Medication 2: at 17:35

## 2023-09-30 RX ADMIN — ATOVAQUONE 1500 MILLIGRAM(S): 750 SUSPENSION ORAL at 12:02

## 2023-09-30 RX ADMIN — HUMAN INSULIN 6 UNIT(S): 100 INJECTION, SUSPENSION SUBCUTANEOUS at 23:27

## 2023-09-30 RX ADMIN — HUMAN INSULIN 6 UNIT(S): 100 INJECTION, SUSPENSION SUBCUTANEOUS at 12:06

## 2023-09-30 RX ADMIN — Medication 25 MILLIGRAM(S): at 06:22

## 2023-09-30 RX ADMIN — CHLORHEXIDINE GLUCONATE 1 APPLICATION(S): 213 SOLUTION TOPICAL at 06:23

## 2023-09-30 RX ADMIN — Medication 1 MILLIGRAM(S): at 12:02

## 2023-09-30 RX ADMIN — MIDODRINE HYDROCHLORIDE 20 MILLIGRAM(S): 2.5 TABLET ORAL at 13:23

## 2023-09-30 RX ADMIN — Medication 30 MILLILITER(S): at 19:55

## 2023-09-30 RX ADMIN — PANTOPRAZOLE SODIUM 40 MILLIGRAM(S): 20 TABLET, DELAYED RELEASE ORAL at 12:03

## 2023-09-30 RX ADMIN — CEFTRIAXONE 100 MILLIGRAM(S): 500 INJECTION, POWDER, FOR SOLUTION INTRAMUSCULAR; INTRAVENOUS at 09:19

## 2023-09-30 RX ADMIN — Medication 80 MILLIGRAM(S): at 06:21

## 2023-09-30 RX ADMIN — Medication 25 MILLIGRAM(S): at 17:27

## 2023-09-30 RX ADMIN — Medication 3 MILLILITER(S): at 21:15

## 2023-09-30 RX ADMIN — Medication 300 MILLIGRAM(S): at 10:30

## 2023-09-30 NOTE — PROGRESS NOTE ADULT - ASSESSMENT
61 yo F w/ Afib initially presented to urgent care for SOB where she had an XR done concerning for b/l lower infiltrates, sent to [facility] ED and admitted to  on 8/26 after being found to be hypoxemic, reported having  debilitating b/l hand numbness/tingling and some muscles weakness several months. She was found to have interstitial lung disease appearance on CT, with the plan for further ILD workup and management, started on prednisone on admission with gradually improving O2 requirements and stable on 2-3LNC. She underwent bronchoscopy with TBBX on 8/30 which showed Non-Specific Intersitial Disease (NSIP)/OP. Labs notable for positive ARIANA 1:1280, RNP > 8, Alejandro > 8. Patient continued on steroids and received cellcept, which was discontinued 2/2 Afib-RVR. Interval CTA chest on 9/11 also negative PE did show possible lingula pneumonia. Started on empiric vancomycin and zosyn 9/12, course was then c/b episode of SVT to 170s w/ rapidly progressive hypoxemic respiratory failure, placed NRB offered HFNC/BiPAP but refused, leading to worsening hypoxemic respiratory failure requiring intubation on 9/13. Despite best practices, patient remained hypoxemic and patient was cannulated for VV-ECMO on 9/13 and transferred to [facility] for further management. 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 Showed significant lung improvement and was decannulated 9/21. Extubated 9/22.     NEUROLOGY  Home meds: gabapentin 100g TID; holding for now  AA&Ox3 at baseline   - following commands w/ delirium likely iso prolonged hospitalization, sedation, and ICU setting. Now improved; following commands and understands conversation.  - required precedex for anxiety, stopped 9/22  - started seroquel 25 mg qhs, dose may have been too much given hypotension and lethargy, now d/c since the 25th.  - CTH 9/14 no acute findings  - PT/OT following - c/w kandi lift to chair twice daily, increase activity as tolerated    ENT  #Epistaxis   - small amount of intermittent bleeding noted in b/l nares and hemoptysis? relieved after applying pressure   - Will continue with afrin and nebulized TXA prn; once able to tolerate food, we will remove the NG tube and allow pressure to   - humidified air provided via face tent  - Nasal saline spray 1 spray each nose PRN   - Afrin 1 spray for rebleeding followed by direct pressure on the nose, if still actively bleeding, call ENT for reassessment  - monitor hgb and for signs of overt bleeding closely       PULMONARY  Admitted to [facility] on 8/26 after p/w SOB, found to be hypoxemic, required 2-4L NC/bibap, initially responded well to IV steroids. 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 2/2 bacterial PNA vs atypical PNA vs aspiration vs AEILD continued to show significant improvement from a lung perspective and was decannulated 9/21, and extubated to HFNC 9/22.  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   - 9/20 BAL culture: normal respiratory selvin  - continue duonebs  - s/p empiric abx   - s/p decannulation on 9/21 with plan for suture removal ~7-10 days   - extubated 9/22 to HFNC   - weaned from HFNC to 6L nasal cannula, given epistaxis currently on face tent. Will continue with face tent until epistaxis fully resolves    CARDIOVASCULAR  Hx of Afib w at OSH, started on Amiodarone gtt now in shock state requiring requiring pressors likely septic with component of vaspoplegia i/s/o sedation, possible contribution of cardiogenic from RV dysfunction. Now having episodes of SVT responsive to lopressor   - No PE seen on invasive pulmonary angiography at time of ECMO cannulation or in recent imaging  - NO2 weaned off  (9/15) RV function remains unchanged  - s/p milrinone, off since (9/13)  - converted to sinus (9/14) discontinued amio gtt iso bradycardia, back to AF with RVR on 9/19 when sedation weaned off  - continue 25mg metoprolol BID for rate control   - BP labile, has received hydralazine IVP for hypertensive episodes w/ anxiety likely a contributing factor, however is also requiring low dose Phenylephrine  for intermittent episodes of hypotension that occurs mostly while asleep.  - phenylephrine off since (9/25), continue midodrine 20mg TID    - RITCHIE 9/14 : No MEREDITH thrombus noted, negative for PFO. LVOT diameter of 2 cm  - TTE 9/12 with EF 65-70% with normal LV and RV  - TTE 9/14 with  EF 18%. Severe global LV systolic dysfunction. RV enlargement with decreased RV systolic function, however RITCHIE and recent POCUS shows normal LV systolic function, improved RV systolic function   - TTE 9/19 with recovered EF to 67% but still with RV enlargement and systolic dysfunction  - Currently on midodrine 20mg q8hrs; will titrate down as tolerated.    GASTROINTESTINAL  Transaminase elevation likely 2/2 combination of shock liver, malposition of ECMO cannula and liver dysfunction  - ALK/ AST/ALT downtrending but remain elevated 490/136/225, T.bili peaked 19.3 mostly direct, and now 13.4  - Acute hepatitis panel negative  - RUQ US 9/15 shows fatty infiltration of liver, negative for hepatobiliary pathology, repeat RUQ US performed 9/22 for worsening LFT's and rising bili with findings negative as well  - Hepatology consulted - likely shock liver with expected delay in improvement in bilirubin  - Hypertriglyceridemia 836, hx of fatty liver and propofol gtt, s/p insulin gtt, now improving  - Given watery bowel movements; will continue with senna only at this time.  - CT abdomen 9/15 w/o bowel obstruction, noted with colonic diverticulosis mostly in sigmoid, w/o evidence of diverticulitis  - nutrition following  - failed dysphagia screen x2 currently with KFT in place tolerating tube feeds, plan for cineesophagogram     RENAL  sCr baseline 0.78  - Creatinine wnl  - s/p diuresis with lasix, last administered 9/15, fluid removal via hemo concentrator while on ECMO circuit, will assess daily if lasix is needed   - s/p Bumex 1mg IVP given to optimize post extubation   - good UO with primafit  - hypernatremia improved, free H2O discontinued 9/21      INFECTIOUS DISEASE  Leukocytosis  - Elevated WBC likely iso filgrastim (initiated 9/17 for leukopenia) as well as s/p ECMO decannulation, bandemia present but have now downtrended   - Afebrile currently   -right wound s/p ecmo cannula site ?infected will start vancomycin for now (9/29-   ) and culture area   - UA with many bacteria, currently on CTX 1gm IVPB daily (9/27-  )   -blood and urine cultures sent 9/27 currently NGTD  - previous cultures, BAL, cytopathology so far negative  - leukopenia now resolved s/p filgrastim (9/17-9/21)  - Bactrim DS discontinued iso leukopenia, continue Mepron for PJP prophylaxis instead  - s/p IV Ceftriaxone 5 days? at [facility] out of an abundance of precaution to cover BAL specimen  - s/p zosyn at OSH (9/12) for lingula PNA  - vancomycin (9/12-9/15 ) d/c'd negative mrsa PCR, and azithro (9/14-9/15) d/c'd neg Urine legionella  - s/p empiric donald (9/13-9/20) now that ANC >1.5  - vanco restarted for ppx on 9/18 d/t leukopenia but stopped on 9/20  - positive COVID-19 antigen in late August  - ID following- f/u recs    ENDOCRINE  # Hyperglycemia i/s/o steroids  Admission A1c 6.1  - s/p insulin gtt, transitioned to NPH 11 w/ISS,  NPH was lowered to 5u units, and now increased to 6units w/mod ISS given glucose >200 iso increase TF rate and hyperglycemia  - continue to adjust insulin and ISS as steroids are tapered and diet changes  - elevated triglycerides 835, has hx of fatty liver, had been on propofol gtt. TG downtrending since initiating insulin gtt for hyperglycemia, now normalizing off propofol  - TSH low initially at 0.13 repeat normal     HEME  #Right IJ thrombus   -s/p argatroban gtt , transitioned to lovenox   - Lowered lovenox dose from 120mg BID to 100mg BID based on elevated Anti Xa level (1.14), plan to redraw Anti Xa today 9/29/23, four hours after 4th dose lovenox 100mg administered  - INR elevated likely iso argatroban and liver dysfxn, s/p Vit. K (9/14) and FFP x1 (9/15) - now resolved  - platelets x 7 for thrombocytopenia <50,000, last transfused  9/21 continues to improve  - Intermittent episodes of epistaxis, no overt bleeding noted. Hgb remains stable     #Leukopenia  #Thrombocytopenia  - Heme consult placed for thrombocytopenia, noted to also be leukopenic but now resolved   - unlikely HLH/MAS since many abnormalities can be explained by severe hypoxemia/hypotension during initial decompensation prior to ecmo cannulation but some features that are consistent and with rheumatic disease it is a possibility to f/u hematology regarding utility of further lab/pathologic testing  - peripheral blood smear reviewed: large platelets noted, no platelet clumps, no blast cells noted, neutrophils noted w/ normal number of lobes, nucleated RBC noted  - acute hepatitis panel negative  - s/p filgrastim 480 daily  given ANC >1500 in the setting of possible infxn   - Thrombocytopenia refractory after intermittent transfusions will monitor now off circuit, platelet count continues to improve    #R/O DVT  - LE duplex negative for DVT   - first VA duplex post decannulation 9/21 shows non-occlusive deep vein thrombosis in the right jugular vein and the right cephalic is non-compressible  - c/w AC      MSK  Onset of debilitating b/l hand cramps and some muscles weakness x several months, unclear etiology, radiculopathy? vs myositis?   - MRI outpatient reportedly showed cervical spine issues, started on Prednisone shortly before admission at OSH  - myomarker panel + for RNP and Ku  - seen by neurologist at [facility]   - symptoms improved with cellcept (9/8-9/11) but discontinued given Afib RVR   - consider restarting home Gabapentin to assist with neuropathic pain? once more alert and awake  - f/u with Dr. Nik Marie or Dante Urbano for EMG upon discharge (osh?)      RHEUM  - started on Solumedrol 60mg q6h from 9/1 to 9/6 then weaned over time to then Medrol 32 mg 9/9 to 9/12 at [facility]  - s/p Cellcept 9/8 to 9/11 but stopped due to Afib- RVR/tachycardia   - CT findings, Improved/decreased extent of bilateral ground glass opacities and reticular markings compared to the previous study. Findings are nonspecific but differential etiologies include interstitial pneumonia, drug toxicity, nonspecific connective tissue disorders.  - OSH labs show strongly positive ARIANA, RNP, and anti smith antibodies with low C4 and normal C3. 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  - s/p rituximab 9/16/23  - plan for next rituximab dose  was for ~9/30/23 but may hold off given patient on abx        SKIN  Reportedly had single episode of painful rash on her shins, ears and neck and chest which resolved with a steroid cream from a dermatologist prior to admission  - no evidence of rash currently  - right groin with breakdown s/p cannula placement may now be source of infection, will culture and consult wound care         PROPHYLAXIS  # DVT: Lovenox  # GI: TF      LINES  -Ecmo Cannulas 9/13-9/21  -[facility] TLC- 9/13-9/21  -Left Ax [place] - 9/13-9/27 (placed at OSH)  -RA 16g PIV x2- 9/15      GO  -Palliative Following  -full code  -partner Kiara at bedside daily and updated on care      63 yo F w/ Afib initially presented to urgent care for SOB where she had an XR done concerning for b/l lower infiltrates, sent to [facility] ED and admitted to  on 8/26 after being found to be hypoxemic, reported having  debilitating b/l hand numbness/tingling and some muscles weakness several months. She was found to have interstitial lung disease appearance on CT, with the plan for further ILD workup and management, started on prednisone on admission with gradually improving O2 requirements and stable on 2-3LNC. She underwent bronchoscopy with TBBX on 8/30 which showed Non-Specific Intersitial Disease (NSIP)/OP. Labs notable for positive ARIANA 1:1280, RNP > 8, Alejandro > 8. Patient continued on steroids and received cellcept, which was discontinued 2/2 Afib-RVR. Interval CTA chest on 9/11 also negative PE did show possible lingula pneumonia. Started on empiric vancomycin and zosyn 9/12, course was then c/b episode of SVT to 170s w/ rapidly progressive hypoxemic respiratory failure, placed NRB offered HFNC/BiPAP but refused, leading to worsening hypoxemic respiratory failure requiring intubation on 9/13. Despite best practices, patient remained hypoxemic and patient was cannulated for VV-ECMO on 9/13 and transferred to [facility] for further management. 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 Showed significant lung improvement and was decannulated 9/21. Extubated 9/22.     NEUROLOGY  Home meds: gabapentin 100g TID; holding for now  AA&Ox3 at baseline   - following commands w/ delirium likely iso prolonged hospitalization, sedation, and ICU setting. Now improved; following commands and understands conversation.  - required precedex for anxiety, stopped 9/22  - started seroquel 25 mg qhs, dose may have been too much given hypotension and lethargy, given delirium episode overnight, can restart seroquel at lower dose of 12.5mg qhs  - CTH 9/14 no acute findings  - PT/OT following - c/w kandi lift to chair twice daily, increase activity as tolerated    ENT  #Epistaxis   - small amount of intermittent bleeding noted in b/l nares and hemoptysis? relieved after applying pressure   - Will continue with afrin and nebulized TXA prn; once able to tolerate food, we will remove the NG tube  - humidified air provided via face tent  - Nasal saline spray 1 spray each nose PRN   - Afrin 1 spray for rebleeding followed by direct pressure on the nose, if still actively bleeding, call ENT for reassessment  - monitor hgb and for signs of overt bleeding closely       PULMONARY  Admitted to [facility] on 8/26 after p/w SOB, found to be hypoxemic, required 2-4L NC/bibap, initially responded well to IV steroids. 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 2/2 bacterial PNA vs atypical PNA vs aspiration vs AEILD continued to show significant improvement from a lung perspective and was decannulated 9/21, and extubated to HFNC 9/22.  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   - 9/20 BAL culture: normal respiratory selvin  - continue duonebs  - s/p empiric abx   - s/p decannulation on 9/21 with plan for suture removal ~7-10 days   - extubated 9/22 to HFNC   - weaned from HFNC to 6L nasal cannula, given epistaxis currently on face tent. Will continue with face tent until epistaxis fully resolves.  - will obtain CTA chest to r/o PE given oxygen requirement    CARDIOVASCULAR  Hx of Afib w at OSH, started on Amiodarone gtt now in shock state requiring requiring pressors likely septic with component of vaspoplegia i/s/o sedation, possible contribution of cardiogenic from RV dysfunction. Now having episodes of SVT responsive to lopressor   - No PE seen on invasive pulmonary angiography at time of ECMO cannulation or in recent imaging  - NO2 weaned off  (9/15) RV function remains unchanged  - s/p milrinone, off since (9/13)  - converted to sinus (9/14) discontinued amio gtt iso bradycardia, back to AF with RVR on 9/19 when sedation weaned off  - continue 25mg metoprolol BID for rate control   - BP labile, has received hydralazine IVP for hypertensive episodes w/ anxiety likely a contributing factor, however is also requiring low dose Phenylephrine  for intermittent episodes of hypotension that occurs mostly while asleep.  - phenylephrine off since (9/25), continue midodrine 20mg TID    - RITCHIE 9/14 : No MEREDITH thrombus noted, negative for PFO. LVOT diameter of 2 cm  - TTE 9/12 with EF 65-70% with normal LV and RV  - TTE 9/14 with  EF 18%. Severe global LV systolic dysfunction. RV enlargement with decreased RV systolic function, however RITCHIE and recent POCUS shows normal LV systolic function, improved RV systolic function   - TTE 9/19 with recovered EF to 67% but still with RV enlargement and systolic dysfunction  - Currently on midodrine 20mg q8hrs; will titrate down as tolerated.    GASTROINTESTINAL  Transaminase elevation likely 2/2 combination of shock liver, malposition of ECMO cannula and liver dysfunction  - ALK/ AST/ALT downtrending but remain elevated 490/136/225, T.bili peaked 19.3 mostly direct, and now 13.4  - Acute hepatitis panel negative  - RUQ US 9/15 shows fatty infiltration of liver, negative for hepatobiliary pathology, repeat RUQ US performed 9/22 for worsening LFT's and rising bili with findings negative as well  - Hepatology consulted - likely shock liver with expected delay in improvement in bilirubin  - Hypertriglyceridemia 836, hx of fatty liver and propofol gtt, s/p insulin gtt, now improving  - Given previous watery bowel movements; will continue with senna only at this time. Last BM 9/29   - failed dysphagia screen x2 currently with KFT in place tolerating tube feeds, plan for cineesophagogram   - c/o abdominal discomfort, relieved with maalox  - nutrition following- recommend changing TF formula to Peptamen to decrease GI discomfort  - CT abdomen 9/15 w/o bowel obstruction, noted with colonic diverticulosis mostly in sigmoid, w/o evidence of diverticulitis  - Repeat CT A/P w/ IV contrast -pending     RENAL  sCr baseline 0.78  - Creatinine wnl  - I/O's:  negative -7L/LOS and positive 910ml/24 hours  - s/p diuresis with lasix, now given PRN. Can given lasix 20mg IVP x1 today to keep net negative  - good UO with primafit  - hypernatremia improved, free H2O discontinued 9/21      INFECTIOUS DISEASE  Leukocytosis  - Elevated WBC likely iso filgrastim (initiated 9/17 for leukopenia) as well as s/p ECMO decannulation, bandemia present but have now downtrended   - Afebrile currently   -right wound s/p ecmo cannula site ?infected will start vancomycin for now (9/29-   ) and culture area   - UA with many bacteria, currently on CTX 1gm IVPB daily (9/27-  )   -blood and urine cultures sent 9/27 currently NGTD  - previous cultures, BAL, cytopathology so far negative  - leukopenia now resolved s/p filgrastim (9/17-9/21)  - Bactrim DS discontinued iso leukopenia, continue Mepron for PJP prophylaxis instead  - s/p IV Ceftriaxone 5 days? at [facility] out of an abundance of precaution to cover BAL specimen  - s/p zosyn at OSH (9/12) for lingula PNA  - vancomycin (9/12-9/15 ) d/c'd negative mrsa PCR, and azithro (9/14-9/15) d/c'd neg Urine legionella  - s/p empiric donald (9/13-9/20) now that ANC >1.5  - vanco restarted for ppx on 9/18 d/t leukopenia but stopped on 9/20  - positive COVID-19 antigen in late August  - RVP and sputum culture pending 9/30  - ID following- f/u recs  - CT C/A/P to r/o abscess/infx etiology    ENDOCRINE  # Hyperglycemia i/s/o steroids  Admission A1c 6.1  - s/p insulin gtt, transitioned to NPH 11 w/ISS,  NPH was lowered to 5u units, and now increased to 6units w/mod ISS given glucose >200 iso increase TF rate and hyperglycemia  - continue to adjust insulin and ISS as steroids are tapered and diet changes  - elevated triglycerides 835, has hx of fatty liver, had been on propofol gtt. TG downtrending since initiating insulin gtt for hyperglycemia, now normalizing off propofol  - TSH low initially at 0.13 repeat normal     HEME  #Right IJ thrombus   -s/p argatroban gtt , transitioned to lovenox   - Lowered lovenox dose from 120mg BID to 100mg BID based on elevated Anti Xa level (1.14), plan to redraw Anti Xa today 9/29/23, four hours after 4th dose lovenox 100mg administered  - INR elevated likely iso argatroban and liver dysfxn, s/p Vit. K (9/14) and FFP x1 (9/15) - now resolved  - platelets x 7 for thrombocytopenia <50,000, last transfused  9/21 continues to improve  - Intermittent episodes of epistaxis, no overt bleeding noted. Hgb remains stable     #Leukopenia  #Thrombocytopenia  - Heme consult placed for thrombocytopenia, noted to also be leukopenic but now resolved   - unlikely HLH/MAS since many abnormalities can be explained by severe hypoxemia/hypotension during initial decompensation prior to ecmo cannulation but some features that are consistent and with rheumatic disease it is a possibility to f/u hematology regarding utility of further lab/pathologic testing  - peripheral blood smear reviewed: large platelets noted, no platelet clumps, no blast cells noted, neutrophils noted w/ normal number of lobes, nucleated RBC noted  - acute hepatitis panel negative  - s/p filgrastim 480 daily  given ANC >1500 in the setting of possible infxn   - Thrombocytopenia refractory after intermittent transfusions will monitor now off circuit, platelet count continues to improve    #R/O DVT  - LE duplex negative for DVT   - first VA duplex post decannulation 9/21 shows non-occlusive deep vein thrombosis in the right jugular vein and the right cephalic is non-compressible  - c/w AC      MSK  Onset of debilitating b/l hand cramps and some muscles weakness x several months, unclear etiology, radiculopathy? vs myositis?   - MRI outpatient reportedly showed cervical spine issues, started on Prednisone shortly before admission at OSH  - myomarker panel + for RNP and Ku  - seen by neurologist at [facility]   - symptoms improved with cellcept (9/8-9/11) but discontinued given Afib RVR   - consider restarting home Gabapentin to assist with neuropathic pain? once more alert and awake  - f/u with Dr. Nik Marie or Dante Urbano for EMG upon discharge (osh?)      RHEUM  - started on Solumedrol 60mg q6h from 9/1 to 9/6 then weaned over time to then Medrol 32 mg 9/9 to 9/12 at H  - s/p Cellcept 9/8 to 9/11 but stopped due to Afib- RVR/tachycardia   - CT findings, Improved/decreased extent of bilateral ground glass opacities and reticular markings compared to the previous study. Findings are nonspecific but differential etiologies include interstitial pneumonia, drug toxicity, nonspecific connective tissue disorders.  - OSH labs show strongly positive ARIANA, RNP, and anti smith antibodies with low C4 and normal C3. 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  - s/p rituximab 9/16/23  - plan for next rituximab dose  was for ~9/30/23 but may hold off given patient on abx        SKIN  Reportedly had single episode of painful rash on her shins, ears and neck and chest which resolved with a steroid cream from a dermatologist prior to admission  - no evidence of rash currently  - right groin with breakdown s/p cannula placement may now be source of infection, will culture and consult wound care         PROPHYLAXIS  # DVT: Lovenox  # GI: TF      LINES  -Ecmo Cannulas 9/13-9/21  -LIJ TLC- 9/13-9/21  -Left Ax Traci - 9/13-9/27 (placed at OSH)  -RA 16g PIV x2- 9/15      GOC  -Palliative Following  -full code  -partner Kiara at bedside daily and updated on care      63 yo F w/ Afib initially presented to urgent care for SOB where she had an XR done concerning for b/l lower infiltrates, sent to [facility] ED and admitted to  on 8/26 after being found to be hypoxemic, reported having  debilitating b/l hand numbness/tingling and some muscles weakness several months. She was found to have interstitial lung disease appearance on CT, with the plan for further ILD workup and management, started on prednisone on admission with gradually improving O2 requirements and stable on 2-3LNC. She underwent bronchoscopy with TBBX on 8/30 which showed Non-Specific Intersitial Disease (NSIP)/OP. Labs notable for positive ARIANA 1:1280, RNP > 8, Alejandro > 8. Patient continued on steroids and received cellcept, which was discontinued 2/2 Afib-RVR. Interval CTA chest on 9/11 also negative PE did show possible lingula pneumonia. Started on empiric vancomycin and zosyn 9/12, course was then c/b episode of SVT to 170s w/ rapidly progressive hypoxemic respiratory failure, placed NRB offered HFNC/BiPAP but refused, leading to worsening hypoxemic respiratory failure requiring intubation on 9/13. Despite best practices, patient remained hypoxemic and patient was cannulated for VV-ECMO on 9/13 and [facility]. 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 Showed significant lung improvement and was decannulated 9/21. Extubated 9/22.     NEUROLOGY  Home meds: gabapentin 100g TID; holding for now  AA&Ox3 at baseline   - following commands w/ delirium likely iso prolonged hospitalization, sedation, and ICU setting. Now improved; following commands and understands conversation.  - required precedex for anxiety, stopped 9/22  - started seroquel 25 mg qhs, dose may have been too much given hypotension and lethargy, however can restart seroquel at lower dose of 12.5mg qhs given delirium episode overnight  - CTH 9/14 no acute findings  - PT/OT following - c/w kandi lift to chair twice daily, increase activity as tolerated    ENT  #Epistaxis   - small amount of intermittent bleeding noted in b/l nares and hemoptysis? relieved after applying pressure   - Will continue with afrin and nebulized TXA prn; once able to tolerate food, we will remove the NG tube  - humidified air provided via face tent  - Nasal saline spray 1 spray each nose PRN   - Afrin 1 spray for rebleeding followed by direct pressure on the nose, if still actively bleeding, call ENT for reassessment  - monitor hgb and for signs of overt bleeding closely       PULMONARY  Admitted to [facility] on 8/26 after p/w SOB, found to be hypoxemic, required 2-4L NC/bibap, initially responded well to IV steroids. 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 2/2 bacterial PNA vs atypical PNA vs aspiration vs AEILD continued to show significant improvement from a lung perspective and was decannulated 9/21, and extubated to HFNC 9/22.  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   - 9/20 BAL culture: normal respiratory selvin  - continue duonebs  - s/p empiric abx   - s/p decannulation on 9/21 with plan for suture removal ~7-10 days   - extubated 9/22 to HFNC   - weaned from HFNC to 6L nasal cannula, given epistaxis currently on face tent. Will continue with face tent until epistaxis fully resolves.  - will obtain CTA chest to r/o PE given oxygen requirement    CARDIOVASCULAR  Hx of Afib w at OSH, started on Amiodarone gtt now in shock state requiring requiring pressors likely septic with component of vaspoplegia i/s/o sedation, possible contribution of cardiogenic from RV dysfunction. Now having episodes of SVT responsive to lopressor   - No PE seen on invasive pulmonary angiography at time of ECMO cannulation or in recent imaging  - NO2 weaned off  (9/15) RV function remains unchanged  - s/p milrinone, off since (9/13)  - converted to sinus (9/14) discontinued amio gtt iso bradycardia, back to AF with RVR on 9/19 when sedation weaned off  - continue 25mg metoprolol BID for rate control   - BP labile, has received hydralazine IVP for hypertensive episodes w/ anxiety likely a contributing factor, however is also requiring low dose Phenylephrine  for intermittent episodes of hypotension that occurs mostly while asleep.  - phenylephrine off since (9/25), continue midodrine 20mg TID    - RITCHIE 9/14 : No MEREDITH thrombus noted, negative for PFO. LVOT diameter of 2 cm  - TTE 9/12 with EF 65-70% with normal LV and RV  - TTE 9/14 with  EF 18%. Severe global LV systolic dysfunction. RV enlargement with decreased RV systolic function, however RITCHIE and recent POCUS shows normal LV systolic function, improved RV systolic function   - TTE 9/19 with recovered EF to 67% but still with RV enlargement and systolic dysfunction  - Currently on midodrine 20mg q8hrs; will titrate down as tolerated.    GASTROINTESTINAL  Transaminase elevation likely 2/2 combination of shock liver, malposition of ECMO cannula and liver dysfunction  - ALK/ AST/ALT downtrending but remain elevated 490/136/225, T.bili peaked 19.3 mostly direct, and now 13.4  - Acute hepatitis panel negative  - RUQ US 9/15 shows fatty infiltration of liver, negative for hepatobiliary pathology, repeat RUQ US performed 9/22 for worsening LFT's and rising bili with findings negative as well  - Hepatology consulted - likely shock liver with expected delay in improvement in bilirubin  - Hypertriglyceridemia 836, hx of fatty liver and propofol gtt, s/p insulin gtt, now improving  - Given previous watery bowel movements; will continue with senna only at this time. Last BM 9/29   - failed dysphagia screen x2 currently with KFT in place tolerating tube feeds, plan for cineesophagogram   - c/o abdominal discomfort, relieved with maalox  - nutrition following- recommend changing TF formula to Peptamen to decrease GI discomfort  - CT abdomen 9/15 w/o bowel obstruction, noted with colonic diverticulosis mostly in sigmoid, w/o evidence of diverticulitis  - Repeat CT A/P w/ IV contrast -pending     RENAL  sCr baseline 0.78  - Creatinine wnl  - I/O's:  negative -7L/LOS and positive 910ml/24 hours  - s/p diuresis with lasix, now given PRN. Can given lasix 20mg IVP x1 today to keep net negative  - good UO with primafit  - hypernatremia improved, free H2O discontinued 9/21      INFECTIOUS DISEASE  Leukocytosis  - Elevated WBC likely iso filgrastim (initiated 9/17 for leukopenia) as well as s/p ECMO decannulation, bandemia present but have now downtrended   - Afebrile currently   -right wound s/p ecmo cannula site ?infected will start vancomycin for now (9/29-   ) and culture area   - UA with many bacteria, currently on CTX 1gm IVPB daily (9/27-  )   -blood and urine cultures sent 9/27 currently NGTD  - previous cultures, BAL, cytopathology so far negative  - leukopenia now resolved s/p filgrastim (9/17-9/21)  - Bactrim DS discontinued iso leukopenia, continue Mepron for PJP prophylaxis instead  - s/p IV Ceftriaxone 5 days? at [facility] out of an abundance of precaution to cover BAL specimen  - s/p zosyn at OSH (9/12) for lingula PNA  - vancomycin (9/12-9/15 ) d/c'd negative mrsa PCR, and azithro (9/14-9/15) d/c'd neg Urine legionella  - s/p empiric donald (9/13-9/20) now that ANC >1.5  - vanco restarted for ppx on 9/18 d/t leukopenia but stopped on 9/20  - positive COVID-19 antigen in late August  - RVP and sputum culture pending 9/30  - ID following- f/u recs  - CT C/A/P to r/o abscess/infx etiology    ENDOCRINE  # Hyperglycemia i/s/o steroids  Admission A1c 6.1  - s/p insulin gtt, transitioned to NPH 11 w/ISS,  NPH was lowered to 5u units, and now increased to 6units w/mod ISS given glucose >200 iso increase TF rate and hyperglycemia  - continue to adjust insulin and ISS as steroids are tapered and diet changes  - elevated triglycerides 835, has hx of fatty liver, had been on propofol gtt. TG downtrending since initiating insulin gtt for hyperglycemia, now normalizing off propofol  - TSH low initially at 0.13 repeat normal     HEME  #Right IJ thrombus   -s/p argatroban gtt , transitioned to lovenox   - Lowered lovenox dose from 120mg BID to 100mg BID based on elevated Anti Xa level (1.14), plan to redraw Anti Xa today 10/01/23, four hours after sose in AM  - INR elevated likely iso argatroban and liver dysfxn, s/p Vit. K (9/14) and FFP x1 (9/15) - now resolved  - platelets x 7 for thrombocytopenia <50,000, last transfused  9/21 continues to improve  - Intermittent episodes of epistaxis, no overt bleeding noted. Hgb remains stable     #Leukopenia  #Thrombocytopenia  - Heme consult placed for thrombocytopenia, noted to also be leukopenic but now resolved   - unlikely HLH/MAS since many abnormalities can be explained by severe hypoxemia/hypotension during initial decompensation prior to ecmo cannulation but some features that are consistent and with rheumatic disease it is a possibility to f/u hematology regarding utility of further lab/pathologic testing  - peripheral blood smear reviewed: large platelets noted, no platelet clumps, no blast cells noted, neutrophils noted w/ normal number of lobes, nucleated RBC noted  - acute hepatitis panel negative  - s/p filgrastim 480 daily  given ANC >1500 in the setting of possible infxn   - Thrombocytopenia refractory after intermittent transfusions will monitor now off circuit, platelet count continues to improve    #R/O DVT  - LE duplex negative for DVT   - first [facility] duplex post decannulation 9/21 shows non-occlusive deep vein thrombosis in the right jugular vein and the right cephalic is non-compressible  - c/w AC      MSK  Onset of debilitating b/l hand cramps and some muscles weakness x several months, unclear etiology, radiculopathy? vs myositis?   - MRI outpatient reportedly showed cervical spine issues, started on Prednisone shortly before admission at [facility]  - [facility] panel + for RNP and Ku  - seen by neurologist at [facility]   - symptoms improved with cellcept (9/8-9/11) but discontinued given Afib RVR   - consider restarting home Gabapentin to assist with neuropathic pain? once more alert and awake  - f/u with Dr. Nik Marie or Dante Urbano for EMG upon discharge (osh?)      RHEUM  - started on Solumedrol 60mg q6h from 9/1 to 9/6 then weaned over time to then Medrol 32 mg 9/9 to 9/12 at H  - s/p Cellcept 9/8 to 9/11 but stopped due to Afib- RVR/tachycardia   - CT findings, Improved/decreased extent of bilateral ground glass opacities and reticular markings compared to the previous study. Findings are nonspecific but differential etiologies include interstitial pneumonia, drug toxicity, nonspecific connective tissue disorders.  - OSH labs show strongly positive ARIANA, RNP, and anti smith antibodies with low C4 and normal C3. 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  - s/p rituximab 9/16/23  - plan for next rituximab dose  was for ~9/30/23 but may hold off given patient on abx        SKIN  Reportedly had single episode of painful rash on her shins, ears and neck and chest which resolved with a steroid cream from a dermatologist prior to admission  - no evidence of rash currently  - right groin with breakdown s/p cannula placement may now be source of infection, will culture and consult wound care         PROPHYLAXIS  # DVT: Lovenox  # GI: TF      LINES  -Ecmo Cannulas 9/13-9/21  -LIJ TLC- 9/13-9/21  -Left Ax [place] - 9/13-9/27 (placed at OSH)  -RA 16g PIV x2- 9/15      GOC  -Palliative Following  -full code  -partner Kiara at bedside daily and updated on care      61 yo F w/ Afib initially presented to urgent care for SOB where she had an XR done concerning for b/l lower infiltrates, sent to [facility] ED and admitted to  on 8/26 after being found to be hypoxemic, reported having  debilitating b/l hand numbness/tingling and some muscles weakness several months. She was found to have interstitial lung disease appearance on CT, with the plan for further ILD workup and management, started on prednisone on admission with gradually improving O2 requirements and stable on 2-3LNC. She underwent bronchoscopy with TBBX on 8/30 which showed Non-Specific Intersitial Disease (NSIP)/OP. Labs notable for positive ARIANA 1:1280, RNP > 8, Alejandro > 8. Patient continued on steroids and received cellcept, which was discontinued 2/2 Afib-RVR. Interval CTA chest on 9/11 also negative PE did show possible lingula pneumonia. Started on empiric vancomycin and zosyn 9/12, course was then c/b episode of SVT to 170s w/ rapidly progressive hypoxemic respiratory failure, placed NRB offered HFNC/BiPAP but refused, leading to worsening hypoxemic respiratory failure requiring intubation on 9/13. Despite best practices, patient remained hypoxemic and patient was cannulated for VV-ECMO on 9/13 and transferred to [facility] for further management. 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 Showed significant lung improvement and was decannulated 9/21. Extubated 9/22.     NEUROLOGY  Home meds: gabapentin 100g TID; holding for now  AA&Ox3 at baseline   - following commands w/ delirium likely iso prolonged hospitalization, sedation, and ICU setting. Now improved; following commands and understands conversation.  - required precedex for anxiety, stopped 9/22  - started seroquel 25 mg qhs, dose may have been too much given hypotension and lethargy, however can restart seroquel at lower dose of 12.5mg qhs given delirium episode overnight  - CTH 9/14 no acute findings  - PT/OT following - c/w kandi lift to chair twice daily, increase activity as tolerated    ENT  #Epistaxis   - small amount of intermittent bleeding noted in b/l nares and hemoptysis? relieved after applying pressure   - Will continue with afrin and nebulized TXA prn; once able to tolerate food, we will remove the NG tube  - humidified air provided via face tent  - Nasal saline spray 1 spray each nose PRN   - Afrin 1 spray for rebleeding followed by direct pressure on the nose, if still actively bleeding, call ENT for reassessment  - monitor hgb and for signs of overt bleeding closely       PULMONARY  Admitted to [facility] on 8/26 after p/w SOB, found to be hypoxemic, required 2-4L NC/bibap, initially responded well to IV steroids. 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 2/2 bacterial PNA vs atypical PNA vs aspiration vs AEILD continued to show significant improvement from a lung perspective and was decannulated 9/21, and extubated to HFNC 9/22.  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   - 9/20 BAL culture: normal respiratory selvin  - continue duonebs  - s/p empiric abx   - s/p decannulation on 9/21 with plan for suture removal ~7-10 days   - extubated 9/22 to HFNC   - weaned from HFNC to 6L nasal cannula, given epistaxis currently on face tent. Will continue with face tent until epistaxis fully resolves.  - will obtain CTA chest to r/o PE given oxygen requirement    CARDIOVASCULAR  Hx of Afib w at OSH, started on Amiodarone gtt now in shock state requiring requiring pressors likely septic with component of vaspoplegia i/s/o sedation, possible contribution of cardiogenic from RV dysfunction. Now having episodes of SVT responsive to lopressor   - No PE seen on invasive pulmonary angiography at time of ECMO cannulation or in recent imaging  - NO2 weaned off  (9/15) RV function remains unchanged  - s/p milrinone, off since (9/13)  - converted to sinus (9/14) discontinued amio gtt iso bradycardia, back to AF with RVR on 9/19 when sedation weaned off  - continue 25mg metoprolol BID for rate control   - BP labile, has received hydralazine IVP for hypertensive episodes w/ anxiety likely a contributing factor, however is also requiring low dose Phenylephrine  for intermittent episodes of hypotension that occurs mostly while asleep.  - phenylephrine off since (9/25), continue midodrine 20mg TID    - RITCHIE 9/14 : No MEREDITH thrombus noted, negative for PFO. LVOT diameter of 2 cm  - TTE 9/12 with EF 65-70% with normal LV and RV  - TTE 9/14 with  EF 18%. Severe global LV systolic dysfunction. RV enlargement with decreased RV systolic function, however RITCHIE and recent POCUS shows normal LV systolic function, improved RV systolic function   - TTE 9/19 with recovered EF to 67% but still with RV enlargement and systolic dysfunction  - Currently on midodrine 20mg q8hrs; will titrate down as tolerated.    GASTROINTESTINAL  Transaminase elevation likely 2/2 combination of shock liver, malposition of ECMO cannula and liver dysfunction  - ALK/ AST/ALT downtrending but remain elevated 490/136/225, T.bili peaked 19.3 mostly direct, and now 13.4  - Acute hepatitis panel negative  - RUQ US 9/15 shows fatty infiltration of liver, negative for hepatobiliary pathology, repeat RUQ US performed 9/22 for worsening LFT's and rising bili with findings negative as well  - Hepatology consulted - likely shock liver with expected delay in improvement in bilirubin  - Hypertriglyceridemia 836, hx of fatty liver and propofol gtt, s/p insulin gtt, now improving  - Given previous watery bowel movements; will continue with senna only at this time. Last BM 9/29   - failed dysphagia screen x2 currently with KFT in place tolerating tube feeds, plan for cineesophagogram   - c/o abdominal discomfort, relieved with maalox  - nutrition following- recommend changing TF formula to Peptamen to decrease GI discomfort  - CT abdomen 9/15 w/o bowel obstruction, noted with colonic diverticulosis mostly in sigmoid, w/o evidence of diverticulitis  - Repeat CT A/P w/ IV contrast -pending     RENAL  sCr baseline 0.78  - Creatinine wnl  - I/O's:  negative -7L/LOS and positive 910ml/24 hours  - s/p diuresis with lasix, now given PRN. Can given lasix 20mg IVP x1 today to keep net negative  - good UO with primafit  - hypernatremia improved, free H2O discontinued 9/21      INFECTIOUS DISEASE  Leukocytosis  - Elevated WBC likely iso filgrastim (initiated 9/17 for leukopenia) as well as s/p ECMO decannulation, bandemia present but have now downtrended   - Afebrile currently   -right wound s/p ecmo cannula site ?infected will start vancomycin for now (9/29-   ) and culture area, next vanco level 9/30 2100  - UA with many bacteria, currently on CTX 1gm IVPB daily (9/27-  )   - blood and urine cultures sent 9/27 currently NGTD  - previous cultures, BAL, cytopathology so far negative  - leukopenia now resolved s/p filgrastim (9/17-9/21)  - Bactrim DS discontinued iso leukopenia, continue Mepron for PJP prophylaxis instead  - s/p IV Ceftriaxone 5 days? at [facility] out of an abundance of precaution to cover BAL specimen  - s/p zosyn at OSH (9/12) for lingula PNA  - vancomycin (9/12-9/15 ) d/c'd negative mrsa PCR, and azithro (9/14-9/15) d/c'd neg Urine legionella  - s/p empiric donald (9/13-9/20) now that ANC >1.5  - vanco restarted for ppx on 9/18 d/t leukopenia but stopped on 9/20  - positive COVID-19 antigen in late August  - repeat RVP negative, f/u sputum culture 9/30  - ID following- f/u recs  - CT C/A/P to r/o abscess/infx etiology    ENDOCRINE  # Hyperglycemia i/s/o steroids  Admission A1c 6.1  - s/p insulin gtt, transitioned to NPH 11 w/ISS,  NPH was lowered to 5u units, and now increased to 6units w/mod ISS given glucose >200 iso increase TF rate and hyperglycemia  - continue to adjust insulin and ISS as steroids are tapered and diet changes  - elevated triglycerides 835, has hx of fatty liver, had been on propofol gtt. TG downtrending since initiating insulin gtt for hyperglycemia, now normalizing off propofol  - TSH low initially at 0.13 repeat normal     HEME  #Right IJ thrombus   -s/p argatroban gtt , transitioned to lovenox   - Lowered lovenox dose from 120mg BID to 100mg BID based on elevated Anti Xa level (1.14), plan to redraw Anti Xa today 10/01/23, four hours after sose in AM  - INR elevated likely iso argatroban and liver dysfxn, s/p Vit. K (9/14) and FFP x1 (9/15) - now resolved  - platelets x 7 for thrombocytopenia <50,000, last transfused  9/21 continues to improve  - Intermittent episodes of epistaxis, no overt bleeding noted. Hgb remains stable     #Leukopenia  #Thrombocytopenia  - Heme consult placed for thrombocytopenia, noted to also be leukopenic but now resolved   - unlikely HLH/MAS since many abnormalities can be explained by severe hypoxemia/hypotension during initial decompensation prior to ecmo cannulation but some features that are consistent and with rheumatic disease it is a possibility to f/u hematology regarding utility of further lab/pathologic testing  - peripheral blood smear reviewed: large platelets noted, no platelet clumps, no blast cells noted, neutrophils noted w/ normal number of lobes, nucleated RBC noted  - acute hepatitis panel negative  - s/p filgrastim 480 daily  given ANC >1500 in the setting of possible infxn   - Thrombocytopenia refractory after intermittent transfusions will monitor now off circuit, platelet count continues to improve    #R/O DVT  - LE duplex negative for DVT   - first VA duplex post decannulation 9/21 shows non-occlusive deep vein thrombosis in the right jugular vein and the right cephalic is non-compressible  - c/w AC      MSK  Onset of debilitating b/l hand cramps and some muscles weakness x several months, unclear etiology, radiculopathy? vs myositis?   - MRI outpatient reportedly showed cervical spine issues, started on Prednisone shortly before admission at OSH  - myomarker panel + for RNP and Ku  - seen by neurologist at [facility]   - symptoms improved with cellcept (9/8-9/11) but discontinued given Afib RVR   - consider restarting home Gabapentin to assist with neuropathic pain? once more alert and awake  - f/u with Dr. Nik Marie or Dante Urbano for EMG upon discharge (osh?)      RHEUM  - started on Solumedrol 60mg q6h from 9/1 to 9/6 then weaned over time to then Medrol 32 mg 9/9 to 9/12 at [facility]  - s/p Cellcept 9/8 to 9/11 but stopped due to Afib- RVR/tachycardia   - CT findings, Improved/decreased extent of bilateral ground glass opacities and reticular markings compared to the previous study. Findings are nonspecific but differential etiologies include interstitial pneumonia, drug toxicity, nonspecific connective tissue disorders.  - OSH labs show strongly positive ARIANA, RNP, and anti smith antibodies with low C4 and normal C3. 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  - s/p rituximab 9/16/23  - plan for next rituximab dose  was for ~9/30/23 but may hold off given patient on abx        SKIN  Reportedly had single episode of painful rash on her shins, ears and neck and chest which resolved with a steroid cream from a dermatologist prior to admission  - no evidence of rash currently  - right groin with breakdown s/p cannula placement may now be source of infection, will culture and consult wound care         PROPHYLAXIS  # DVT: Lovenox  # GI: TF      LINES  -Ecmo Cannulas 9/13-9/21  -LIJ TLC- 9/13-9/21  -Left Ax Traci - 9/13-9/27 (placed at OSH)  -RA 16g PIV x2- 9/15      GOC  -Palliative Following  -full code  -partner Kiara at bedside daily and updated on care      61 yo F w/ Afib initially presented to urgent care for SOB where she had an XR done concerning for b/l lower infiltrates, sent to [facility] ED and admitted to  on 8/26 after being found to be hypoxemic, reported having  debilitating b/l hand numbness/tingling and some muscles weakness several months. She was found to have interstitial lung disease appearance on CT, with the plan for further ILD workup and management, started on prednisone on admission with gradually improving O2 requirements and stable on 2-3LNC. She underwent bronchoscopy with TBBX on 8/30 which showed Non-Specific Intersitial Disease (NSIP)/OP. Labs notable for positive ARIANA 1:1280, RNP > 8, Alejandro > 8. Patient continued on steroids and received cellcept, which was discontinued 2/2 Afib-RVR. Interval CTA chest on 9/11 also negative PE did show possible lingula pneumonia. Started on empiric vancomycin and zosyn 9/12, course was then c/b episode of SVT to 170s w/ rapidly progressive hypoxemic respiratory failure, placed NRB offered HFNC/BiPAP but refused, leading to worsening hypoxemic respiratory failure requiring intubation on 9/13. Despite best practices, patient remained hypoxemic and patient was cannulated for VV-ECMO on 9/13 and transferred to [facility] for further management. 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 Showed significant lung improvement and was decannulated 9/21. Extubated 9/22.     NEUROLOGY  Home meds: gabapentin 100g TID; holding for now  AA&Ox3 at baseline   - following commands w/ delirium likely iso prolonged hospitalization, sedation, and ICU setting. Now improved; following commands and understands conversation.  - required precedex for anxiety, stopped 9/22  - started seroquel 25 mg qhs, dose may have been too much given hypotension and lethargy, however can restart seroquel at lower dose of 12.5mg qhs given delirium episode overnight  - CTH 9/14 no acute findings  - PT/OT following - c/w kandi lift to chair twice daily, increase activity as tolerated    ENT  #Epistaxis   - small amount of intermittent bleeding noted in b/l nares and hemoptysis? relieved after applying pressure   - Will continue with afrin and nebulized TXA prn; once able to tolerate food, we will remove the NG tube  - humidified air provided via face tent  - Nasal saline spray 1 spray each nose PRN   - Afrin 1 spray for rebleeding followed by direct pressure on the nose, if still actively bleeding, call ENT for reassessment  - monitor hgb and for signs of overt bleeding closely       PULMONARY  Admitted to [facility] on 8/26 after p/w SOB, found to be hypoxemic, required 2-4L NC/bibap, initially responded well to IV steroids. 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 2/2 bacterial PNA vs atypical PNA vs aspiration vs AEILD continued to show significant improvement from a lung perspective and was decannulated 9/21, and extubated to HFNC 9/22.  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   - 9/20 BAL culture: normal respiratory selvin  - continue duonebs  - s/p empiric abx   - s/p decannulation on 9/21 with plan for suture removal ~7-10 days   - extubated 9/22 to HFNC   - weaned from HFNC to 6L nasal cannula, given epistaxis currently on face tent. Will continue with face tent until epistaxis fully resolves.      CARDIOVASCULAR  Hx of Afib w at OSH, started on Amiodarone gtt now in shock state requiring requiring pressors likely septic with component of vaspoplegia i/s/o sedation, possible contribution of cardiogenic from RV dysfunction. Now having episodes of SVT responsive to lopressor   - No PE seen on invasive pulmonary angiography at time of ECMO cannulation or in recent imaging  - NO2 weaned off  (9/15) RV function remains unchanged  - s/p milrinone, off since (9/13)  - converted to sinus (9/14) discontinued amio gtt iso bradycardia, back to AF with RVR on 9/19 when sedation weaned off  - continue 25mg metoprolol BID for rate control   - Phenylephrine off since (9/25), continue midodrine 20mg q8 hours; will titrate down as tolerated  - RITCHIE 9/14 : No MEREDITH thrombus noted, negative for PFO. LVOT diameter of 2 cm  - TTE 9/12 with EF 65-70% with normal LV and RV  - TTE 9/14 with  EF 18%. Severe global LV systolic dysfunction. RV enlargement with decreased RV systolic function, however RITCHIE and recent POCUS shows normal LV systolic function, improved RV systolic function   - TTE 9/19 with recovered EF to 67% but still with RV enlargement and systolic dysfunction      GASTROINTESTINAL  Transaminase elevation likely 2/2 combination of shock liver, malposition of ECMO cannula and liver dysfunction  - ALK/ AST/ALT downtrending but remain elevated [id number]/287092, T.bili peaked 19.3 mostly direct, and now 11.7  - Acute hepatitis panel negative  - RUQ US 9/15 shows fatty infiltration of liver, negative for hepatobiliary pathology, repeat RUQ US performed 9/22 for worsening LFT's and rising bili with findings negative as well  - Hepatology consulted - likely shock liver with expected delay in improvement in bilirubin  - Hypertriglyceridemia 836, hx of fatty liver and propofol gtt, s/p insulin gtt, now improving  - Given previous watery bowel movements; will continue with senna only at this time. Last BM 9/29   - failed dysphagia screen x2 currently with KFT in place tolerating tube feeds, plan for cineesophagogram   - nutrition following- recommend changing TF formula to Peptamen to decrease GI discomfort  - CT abdomen 9/15 w/o bowel obstruction, noted with colonic diverticulosis mostly in sigmoid, w/o evidence of diverticulitis  - CT abdomen 9/30 shows distended gallbladder w/sludge, can obtain HIDA scan r/o  iso persistent leukocytosis with c/o intermittent abdominal pain       RENAL  sCr baseline 0.78  - Creatinine wnl  - I/O's:  negative -7L/LOS and positive 910ml/24 hours  - s/p diuresis with lasix, now given PRN. Can given lasix 20mg IVP x1 today, goal keep net even to slightly negative  - good UO with primafit  - hypernatremia improved, free H2O discontinued 9/21      INFECTIOUS DISEASE  Leukocytosis  - Afebrile, however WBC continues to uptrend, likely iso filgrastim (initiated 9/17-9/21 for leukopenia) vs infection?   - Cultures sent 9/27 negative to date, sputum culture pending  - right wound s/p ecmo cannula site ?infected will start vancomycin for now (9/29-   ) and culture area, next vanco level 9/30 2100  - UA with many bacteria, currently on CTX 1gm IVPB daily (9/27-  )   - previous cultures, BAL, cytopathology so far negative  - leukopenia now resolved s/p filgrastim (9/17-9/21)  - Bactrim DS discontinued iso leukopenia, continue Mepron for PJP prophylaxis instead  - s/p IV Ceftriaxone 5 days? at [facility] out of an abundance of precaution to cover BAL specimen  - s/p zosyn at OSH (9/12) for lingula PNA  - vancomycin (9/12-9/15 ) d/c'd negative mrsa PCR, and azithro (9/14-9/15) d/c'd neg Urine legionella  - s/p empiric donald (9/13-9/20) now that ANC >1.5  - vanco restarted for ppx on 9/18 d/t leukopenia but stopped on 9/20  - positive COVID-19 antigen in late August, repeat RVP negative 9/30  - ID following- f/u recs  - CT C/A/P to r/o abscess or infectious etiology    ENDOCRINE  # Hyperglycemia i/s/o steroids  Admission A1c 6.1  - s/p insulin gtt, transitioned to NPH 11 w/ISS,  NPH was lowered to 5u units, and now increased to 6units w/mod ISS given glucose >200 iso increase TF rate and hyperglycemia  - continue to adjust insulin and ISS as steroids are tapered and diet changes  - elevated triglycerides 835, has hx of fatty liver, had been on propofol gtt. TG downtrending since initiating insulin gtt for hyperglycemia, now normalizing off propofol  - TSH low initially at 0.13 repeat normal     HEME  #Right IJ thrombus   -s/p argatroban gtt , transitioned to lovenox   - Lowered lovenox dose from 120mg BID to 100mg BID based on elevated Anti Xa level (1.14), plan to redraw Anti Xa today 10/01/23, four hours after sose in AM  - INR elevated likely iso argatroban and liver dysfxn, s/p Vit. K (9/14) and FFP x1 (9/15) - now resolved  - platelets x 7 for thrombocytopenia <50,000, last transfused  9/21 continues to improve  - Intermittent episodes of epistaxis, no overt bleeding noted. Hgb remains stable     #Leukopenia  #Thrombocytopenia  - Heme consult placed for thrombocytopenia, noted to also be leukopenic but now resolved   - unlikely HLH/MAS since many abnormalities can be explained by severe hypoxemia/hypotension during initial decompensation prior to ecmo cannulation but some features that are consistent and with rheumatic disease it is a possibility to f/u hematology regarding utility of further lab/pathologic testing  - peripheral blood smear reviewed: large platelets noted, no platelet clumps, no blast cells noted, neutrophils noted w/ normal number of lobes, nucleated RBC noted  - acute hepatitis panel negative  - s/p filgrastim 480 daily  given ANC >1500 in the setting of possible infxn   - Thrombocytopenia refractory after intermittent transfusions will monitor now off circuit, platelet count continues to improve    #R/O DVT  - LE duplex negative for DVT   - first VA duplex post decannulation 9/21 shows non-occlusive deep vein thrombosis in the right jugular vein and the right cephalic is non-compressible  - c/w AC      MSK  Onset of debilitating b/l hand cramps and some muscles weakness x several months, unclear etiology, radiculopathy? vs myositis?   - MRI outpatient reportedly showed cervical spine issues, started on Prednisone shortly before admission at OSH  - myomarker panel + for RNP and Ku  - seen by neurologist at [facility]   - symptoms improved with cellcept (9/8-9/11) but discontinued given Afib RVR   - consider restarting home Gabapentin to assist with neuropathic pain? once more alert and awake  - f/u with Dr. Nik Marie or Dante Urbano for EMG upon discharge (osh?)      RHEUM  - started on Solumedrol 60mg q6h from 9/1 to 9/6 then weaned over time to then Medrol 32 mg 9/9 to 9/12 at [facility]  - s/p Cellcept 9/8 to 9/11 but stopped due to Afib- RVR/tachycardia   - CT findings, Improved/decreased extent of bilateral ground glass opacities and reticular markings compared to the previous study. Findings are nonspecific but differential etiologies include interstitial pneumonia, drug toxicity, nonspecific connective tissue disorders.  - OSH labs show strongly positive ARIANA, RNP, and anti smith antibodies with low C4 and normal C3. 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  - s/p rituximab 9/16/23  - plan for next rituximab dose  was for ~9/30/23 but will hold off for now given patient currently receiving antibiotics       SKIN  Reportedly had single episode of painful rash on her shins, ears and neck and chest which resolved with a steroid cream from a dermatologist prior to admission  - no evidence of rash currently  - right groin with breakdown s/p cannula placement may now be source of infection, will culture and consult wound care         PROPHYLAXIS  # DVT: Lovenox  # GI: TF      LINES  -Ecmo Cannulas 9/13-9/21  -LIJ TLC- 9/13-9/21  -Left Ax Berino - 9/13-9/27 (placed at OSH)  -RA 16g PIV x2- 9/15      GOC  -Palliative Following  -full code  -partner Kiara at bedside daily and updated on care

## 2023-09-30 NOTE — CHART NOTE - NSCHARTNOTEFT_GEN_A_CORE
NUTRITION FOLLOW-UP      63yo F transferred to [facility] with ARDS, intubated/sedated & cannulated (9/13) and initiated on VV-ECMO.  Recent findings c/w MCTD-ILD.  Pt. is now s/p decannulation & extubation (9/22) to HFNC.   Currently with acute hypoxemic respiratory failure, encephalopathy, RIJ DVT, transaminitis/hyperbilirubinemia, immunosuppressed, SVT, oropharyngeal dysphagia & shock.    Remains on enteral nutrition.  Swallow evaluation (9/28) recommendation for pureed w mildly thick & objective testing (MBS).  However MBS unable to be completed on (9/29) as Pt. still not medially optimized.  Still concern for possible aspiration.  Remains on TF.    Current enteral regimen adequately meets estimated energy needs.  No further weight decrease, now weight trending up.    Observed Pt. receiving Glucerna 1.5 via NGT @ goal of 55mL/hr.  Met with Pt.  Alert, although somewhat disoriented & lethargic.  Per NP, reports Pt. with c/o abdominal discomfort and tenderness.  Ordered for Maalox. No noted emesis, diarrhea, constipation.  Last BM (9/29).   Rectal removed since prior nutrition follow up assessment.    Per discussion w attending physician and NP, suggest changing enteral formula to Peptamen 1.5 which may decrease s/sx of GI discomfort.  Will continue to require additional protein supplementation.    Can start Peptamen 1.5 @ goal of 50mL/hr x 24hrs with LPS (protein supplement) 3 packets per day.  Continue to monitor glucose... adjust insulin as/if warranted.            _________________Diet___________________  Diet, NPO with Tube Feed:   Tube Feeding Modality: Orogastric  Glucerna 1.5 Marvin (GLUCERNA1.5RTH)  Total Volume for 24 Hours (mL): 1320  Continuous  Starting Tube Feed Rate {mL per Hour}: 55  Until Goal Tube Feed Rate (mL per Hour): 55  Tube Feed Duration (in Hours): 24  Tube Feed Start Time: 14:00  No Carb Prosource (1pkg = 15gms Protein)     Qty per Day:  3 (09-26-23 @ 14:05) [Active]      provides:  1320mL total volume  1980 kcals  109g protein (+ 45g additional protein via NoCarb Prosource)= 154g total protein  1002mL free water    Weight:                    Height:  68"              Upper 10% Ideal Body Weight: 154lbs / 69.8kg  126.2kg (9/30)  117.5kg (9/26)            131.7kg (9/21)  133.9kg (9/20)  131.6kg (9/17)  127.6kg (9/13 on admit)        _____Re-Estimated Energy Needs (based on upper 10% Ideal Body Weight)_____  25-30 kcals/kg = 3824-6124 KCals/d  1.8-2.0g protein/kg = 126-140g protein/d    Edema:  2+ generalized   Skin:  No pressure injuries.          ________________________Pertinent Medications____________   MEDICATIONS  (STANDING):  albuterol/ipratropium for Nebulization 3 milliLiter(s) Nebulizer every 6 hours  artificial  tears Solution 1 Drop(s) Both EYES every 12 hours  atovaquone  Suspension 1500 milliGRAM(s) Oral daily  cefTRIAXone   IVPB 1000 milliGRAM(s) IV Intermittent every 24 hours  chlorhexidine 2% Cloths 1 Application(s) Topical <User Schedule>  dextrose 5%. 1000 milliLiter(s) (50 mL/Hr) IV Continuous <Continuous>  dextrose 50% Injectable 25 Gram(s) IV Push once  dextrose 50% Injectable 12.5 Gram(s) IV Push once  dextrose 50% Injectable 25 Gram(s) IV Push once  enoxaparin Injectable 100 milliGRAM(s) SubCutaneous <User Schedule>  folic acid 1 milliGRAM(s) Oral daily  glucagon  Injectable 1 milliGRAM(s) IntraMuscular once  insulin lispro (ADMELOG) corrective regimen sliding scale   SubCutaneous every 6 hours  insulin NPH human recombinant 6 Unit(s) SubCutaneous every 6 hours  methylPREDNISolone sodium succinate Injectable 80 milliGRAM(s) IV Push daily  metoprolol tartrate 25 milliGRAM(s) Oral two times a day  midodrine 20 milliGRAM(s) Oral every 8 hours  multivitamin/minerals/iron Oral Solution (CENTRUM) 15 milliLiter(s) Oral daily  pantoprazole  Injectable 40 milliGRAM(s) IV Push daily  senna Syrup 10 milliLiter(s) Oral at bedtime  vancomycin  IVPB      vancomycin  IVPB 1500 milliGRAM(s) IV Intermittent every 12 hours    MEDICATIONS  (PRN):  aluminum hydroxide/magnesium hydroxide/simethicone Suspension 30 milliLiter(s) Oral every 4 hours PRN Dyspepsia  dextrose Oral Gel 15 Gram(s) Oral once PRN Blood Glucose LESS THAN 70 milliGRAM(s)/deciliter  sodium chloride 0.65% Nasal 1 Spray(s) Both Nostrils every 8 hours PRN Nasal Congestion  tranexamic acid Injectable for Nebulization 500 milliGRAM(s) Inhalation every 8 hours PRN Nose bleed          _________________________Pertinent Labs____________________     09-30    136  |  99  |  36<H>  ----------------------------<  150<H>  4.2   |  28  |  0.44<L>    Ca    9.4      30 Sep 2023 00:58  Phos  2.3     09-30  Mg     2.20     09-30    TPro  5.2<L>  /  Alb  2.8<L>  /  TBili  11.7<H>  /  DBili  x   /  AST  141<H>  /  ALT  209<H>  /  AlkPhos  771<H>  09-30                                                                   10.5   27.96 )-----------( 299      ( 30 Sep 2023 00:58 )             32.5         CAPILLARY BLOOD GLUCOSE      POCT Blood Glucose.: 143 mg/dL (30 Sep 2023 06:20)    POCT Blood Glucose.: 143 mg/dL (09-30-23 @ 06:20)  POCT Blood Glucose.: 151 mg/dL (09-30-23 @ 00:48)  POCT Blood Glucose.: 155 mg/dL (09-29-23 @ 17:53)  POCT Blood Glucose.: 270 mg/dL (09-29-23 @ 12:23)      ________NUTRITION Dx_________    Pt. remains severely malnourished         PLAN/RECOMMENDATIONS:    1) D/C Glucerna 1.5   Initiate Peptamen 1.5 @ goal of 50mL/hr x 24hrs + 3 packets LPS per day    provides:  1200mL total volume  1800 KCals   82g protein (+45 g additional protein via LPS)= 127g total protein     2) Obtain daily weights  3) Monitor tolerance to TF, GI status, electrolytes, glucose     RDN remains available and will f/u PRN.          Marjorie Kidd, TONYN, CDN       pager [phone] or MS Teams

## 2023-09-30 NOTE — PROGRESS NOTE ADULT - SUBJECTIVE AND OBJECTIVE BOX
CHIEF COMPLAINT:    Interval Events:    HPI:  62 year old female with a past medical history of afib, and sciatica, who presented to [facility] for shortness of breath. She had gone to [facility] where she had CXR which showed bilateral lower lobe infiltrates so was sent to ER. She had been having exertional dyspnea and hypoxemia (on home pulse ox to 82%). She had been having dyspnea for several months and had a workup in [place] with a CT scan (which was negative for PE). She also states that she was seen by a pulmonologist and rheumatology, where they ruled out lupus and other immunological disorders.    [facility] Hospital Course  Found to be hypoxic and have interstitial lung disease appearance on CT, admitted 8/26 for AHRF, further ILD workup and management. TTE 8/26 with normal LVEF. Pt was started on prednisone on admission with gradually improving O2 requirement and has been stable on 2-3LNC since 9/3. Started steroid taper on 9/6 and fully transitioned to PO 9/8 overnight. Started on Mycophenolate mofetil (cellcept) 9/8 evening but pt reported subjective side effects with weakness. Episode of AF w RVR with HR up to 140s on 9/11 am, decreased to HR 10-110s with IV lopressor 10. CTA negative for PE and showed interval improvements in GGO but possible lingular bleb and RML bronchiectasis. Patient received 2L of but before more fluids and beta-blocker pt developed heart palpitations with EKG HR 170s with SVT. BPs 105/70s. Did not respond to vagal maneuvers. Pt given oral lopressor 12.5 and IV lopressor 5, with improvement of HR to 130s. SBP briefly dropped to 60-70s then recovered. Patient on NRB offered HFNC/BiPAP but refused. Started on empiric vancomycin and zosyn. BCx w/ NGTD. Per pulm increased solumedrol to 40mg qd. Patient acceded to HFNC in which she desatted and presented with increased wob for which she was transitioned to BiPAP. Patient with anxiety while on BiPAP presenting tachypnea and desatting to the 80s despite verbal redirection for which she was administered ativan 1mg IVP x1. Patient distress improved with sats in the low 90s but had desaturation to 70s. STAT CXR showed increased pulmonary congestion for which patient was administered lasix without improvement. Intubated and started on mechanical ventilation but required very high PEEP (18) and 100% FiO2 and still had low saturations. VV ECMO team consulted and accepted to [facility]. Per signout patient had RV enlargement and dysfunction on POCUS with concern for either new PE vs high pulmonary pressures due to PEEP 18 and lung dysfunction. Patient went for cannulation at [facility] with flouro showing no acute PE. Cannulated with 25 F drainage cannula in R Fem V and 23F “arterial” cannula in RIJ.    (13 Sep 2023 15:24)      REVIEW OF SYSTEMS:  Constitutional: [ ] negative [ ] fevers [ ] chills [ ] weight loss [ ] weight gain  HEENT: [ ] negative [ ] dry eyes [ ] eye irritation [ ] postnasal drip [ ] nasal congestion  CV: [ ] negative  [ ] chest pain [ ] orthopnea [ ] palpitations [ ] murmur  Resp: [ ] negative [ ] cough [ ] shortness of breath [ ] dyspnea [ ] wheezing [ ] sputum [ ] hemoptysis  GI: [ ] negative [ ] nausea [ ] vomiting [ ] diarrhea [ ] constipation [ ] abd pain [ ] dysphagia   : [ ] negative [ ] dysuria [ ] nocturia [ ] hematuria [ ] increased urinary frequency  Musculoskeletal: [ ] negative [ ] back pain [ ] myalgias [ ] arthralgias [ ] fracture  Skin: [ ] negative [ ] rash [ ] itch  Neurological: [ ] negative [ ] headache [ ] dizziness [ ] syncope [ ] weakness [ ] numbness  Psychiatric: [ ] negative [ ] anxiety [ ] depression  Endocrine: [ ] negative [ ] diabetes [ ] thyroid problem  Hematologic/Lymphatic: [ ] negative [ ] anemia [ ] bleeding problem  Allergic/Immunologic: [ ] negative [ ] itchy eyes [ ] nasal discharge [ ] hives [ ] angioedema  [ ] All other systems negative  [ ] Unable to assess ROS because ________    OBJECTIVE:  ICU Vital Signs Last 24 Hrs  T(C): 37.1 (30 Sep 2023 06:00), Max: 37.1 (29 Sep 2023 08:00)  T(F): 98.8 (30 Sep 2023 06:00), Max: 98.8 (29 Sep 2023 20:00)  HR: 106 (30 Sep 2023 06:00) (61 - 106)  BP: 111/62 (30 Sep 2023 06:00) (111/58 - 141/75)  BP(mean): 72 (30 Sep 2023 06:00) (67 - 91)  ABP: --  ABP(mean): --  RR: 37 (30 Sep 2023 06:00) (19 - 39)  SpO2: 95% (30 Sep 2023 06:00) (86% - 100%)    O2 Parameters below as of 30 Sep 2023 06:00  Patient On (Oxygen Delivery Method): face tent    O2 Concentration (%): 30 09-28 @ 07:01  -  09-29 @ 07:00  --------------------------------------------------------  IN: 1315 mL / OUT: 1400 mL / NET: -85 mL    09-29 @ 07:01  -  09-30 @ 06:38  --------------------------------------------------------  IN: 2010 mL / OUT: 1100 mL / NET: 910 mL      CAPILLARY BLOOD GLUCOSE      POCT Blood Glucose.: 143 mg/dL (30 Sep 2023 06:20)      Physical Exam:   Constitutional: ill appearing, no acute distress   HEENT: + PERRLA, EOMI, no drainage or redness  Neck: supple,  No JVD  Respiratory: Ventilator assisted breath Sounds equal & clear bilaterally to auscultation, no accessory muscle use noted  Cardiovascular: Regular rate, regular rhythm, normal S1, S2; no murmurs or rub  Gastrointestinal: Soft, non-tender, non distended, no hepatosplenomegaly, normal bowel sounds  Extremities: + 2 peripheral edema, no cyanosis, no clubbing   Vascular: Equal and normal pulses: 2+ peripheral pulses throughout  Neurological: sedated/intubated  Psychiatric: calm, normal mood, normal affect  Musculoskeletal: No joint swelling or deformity; no limitation of movement  Skin: warm, dry, well perfused, no rashes    HOSPITAL MEDICATIONS:  Standing Meds:  albuterol/ipratropium for Nebulization 3 milliLiter(s) Nebulizer every 6 hours  artificial  tears Solution 1 Drop(s) Both EYES every 12 hours  atovaquone  Suspension 1500 milliGRAM(s) Oral daily  cefTRIAXone   IVPB 1000 milliGRAM(s) IV Intermittent every 24 hours  chlorhexidine 2% Cloths 1 Application(s) Topical <User Schedule>  dextrose 5%. 1000 milliLiter(s) IV Continuous <Continuous>  dextrose 50% Injectable 25 Gram(s) IV Push once  dextrose 50% Injectable 12.5 Gram(s) IV Push once  dextrose 50% Injectable 25 Gram(s) IV Push once  enoxaparin Injectable 100 milliGRAM(s) SubCutaneous <User Schedule>  folic acid 1 milliGRAM(s) Oral daily  glucagon  Injectable 1 milliGRAM(s) IntraMuscular once  insulin lispro (ADMELOG) corrective regimen sliding scale   SubCutaneous every 6 hours  insulin NPH human recombinant 6 Unit(s) SubCutaneous every 6 hours  methylPREDNISolone sodium succinate Injectable 80 milliGRAM(s) IV Push daily  metoprolol tartrate 25 milliGRAM(s) Oral two times a day  midodrine 20 milliGRAM(s) Oral every 8 hours  multivitamin/minerals/iron Oral Solution (CENTRUM) 15 milliLiter(s) Oral daily  pantoprazole  Injectable 40 milliGRAM(s) IV Push daily  potassium phosphate IVPB 15 milliMole(s) IV Intermittent once  senna Syrup 10 milliLiter(s) Oral at bedtime  vancomycin  IVPB      vancomycin  IVPB 1500 milliGRAM(s) IV Intermittent every 12 hours      PRN Meds:  dextrose Oral Gel 15 Gram(s) Oral once PRN  sodium chloride 0.65% Nasal 1 Spray(s) Both Nostrils every 8 hours PRN  tranexamic acid Injectable for Nebulization 500 milliGRAM(s) Inhalation every 8 hours PRN      LABS:                        10.5   27.96 )-----------( 299      ( 30 Sep 2023 00:58 )             32.5     Hgb Trend: 10.5<--, 10.3<--, 10.6<--, 11.6<--, 10.4<--  09-30    136  |  99  |  36<H>  ----------------------------<  150<H>  4.2   |  28  |  0.44<L>    Ca    9.4      30 Sep 2023 00:58  Phos  2.3     09-30  Mg     2.20     09-30    TPro  5.2<L>  /  Alb  2.8<L>  /  TBili  11.7<H>  /  DBili  x   /  AST  141<H>  /  ALT  209<H>  /  AlkPhos  771<H>  09-30    Creatinine Trend: 0.44<--, 0.45<--, 0.46<--, 0.44<--, 0.43<--, 0.49<--  PT/INR - ( 29 Sep 2023 00:25 )   PT: 12.1 sec;   INR: 1.08 ratio         PTT - ( 29 Sep 2023 00:25 )  PTT:36.2 sec  Urinalysis Basic - ( 30 Sep 2023 00:58 )    Color: x / Appearance: x / SG: x / pH: x  Gluc: 150 mg/dL / Ketone: x  / Bili: x / Urobili: x   Blood: x / Protein: x / Nitrite: x   Leuk Esterase: x / RBC: x / WBC x   Sq Epi: x / Non Sq Epi: x / Bacteria: x        Venous Blood Gas:  09-30 @ 00:58  7.50/39/101/30/98.8  VBG Lactate: 2.1  Venous Blood Gas:  09-29 @ 00:25  7.47/48/45/35/74.4  VBG Lactate: 1.1      MICROBIOLOGY:     Culture - Blood (collected 27 Sep 2023 19:50)  Source: .Blood Blood-Peripheral  Preliminary Report (30 Sep 2023 01:02):    No growth at 48 Hours    Culture - Urine (collected 27 Sep 2023 18:26)  Source: Catheterized Catheterized  Final Report (28 Sep 2023 21:25):    No growth    Culture - Blood (collected 27 Sep 2023 14:30)  Source: .Blood Blood-Peripheral  Preliminary Report (29 Sep 2023 20:01):    No growth at 48 Hours      RADIOLOGY:  [ ] Reviewed and interpreted by me           Interval Events:  -Episodes of anxiety and delirium, requiring redirection  -TXA neb for slow anterior nasal bleed    HPI:  62 year old female with a past medical history of afib, and sciatica, who presented to [facility] for shortness of breath. She had gone to [facility] where she had CXR which showed bilateral lower lobe infiltrates so was sent to ER. She had been having exertional dyspnea and hypoxemia (on home pulse ox to 82%). She had been having dyspnea for several months and had a workup in [place] with a CT scan (which was negative for PE). She also states that she was seen by a pulmonologist and rheumatology, where they ruled out lupus and other immunological disorders.    [facility] Hospital Course  Found to be hypoxic and have interstitial lung disease appearance on CT, admitted 8/26 for AHRF, further ILD workup and management. TTE 8/26 with normal LVEF. Pt was started on prednisone on admission with gradually improving O2 requirement and has been stable on 2-3LNC since 9/3. Started steroid taper on 9/6 and fully transitioned to PO 9/8 overnight. Started on Mycophenolate mofetil (cellcept) 9/8 evening but pt reported subjective side effects with weakness. Episode of AF w RVR with HR up to 140s on 9/11 am, decreased to HR 10-110s with IV lopressor 10. CTA negative for PE and showed interval improvements in GGO but possible lingular bleb and RML bronchiectasis. Patient received 2L of but before more fluids and beta-blocker pt developed heart palpitations with EKG HR 170s with SVT. BPs 105/70s. Did not respond to vagal maneuvers. Pt given oral lopressor 12.5 and IV lopressor 5, with improvement of HR to 130s. SBP briefly dropped to 60-70s then recovered. Patient on NRB offered HFNC/BiPAP but refused. Started on empiric vancomycin and zosyn. BCx w/ NGTD. Per pulm increased solumedrol to 40mg qd. Patient acceded to HFNC in which she desatted and presented with increased wob for which she was transitioned to BiPAP. Patient with anxiety while on BiPAP presenting tachypnea and desatting to the 80s despite verbal redirection for which she was administered ativan 1mg IVP x1. Patient distress improved with sats in the low 90s but had desaturation to 70s. STAT CXR showed increased pulmonary congestion for which patient was administered lasix without improvement. Intubated and started on mechanical ventilation but required very high PEEP (18) and 100% FiO2 and still had low saturations. VV ECMO team consulted and accepted to [facility] center. Per signout patient had RV enlargement and dysfunction on POCUS with concern for either new PE vs high pulmonary pressures due to PEEP 18 and lung dysfunction. Patient went for cannulation at [facility] with flouro showing no acute PE. Cannulated with 25 F drainage cannula in R Fem V and 23F “arterial” cannula in RIJ.    (13 Sep 2023 15:24)    SUBJECTIVE: Patient seen and examined at bedside.     REVIEW OF SYSTEMS:  [x] All other systems negative  [ ] Unable to assess ROS because ________    OBJECTIVE:  ICU Vital Signs Last 24 Hrs  T(C): 37.1 (30 Sep 2023 06:00), Max: 37.1 (29 Sep 2023 08:00)  T(F): 98.8 (30 Sep 2023 06:00), Max: 98.8 (29 Sep 2023 20:00)  HR: 106 (30 Sep 2023 06:00) (61 - 106)  BP: 111/62 (30 Sep 2023 06:00) (111/58 - 141/75)  BP(mean): 72 (30 Sep 2023 06:00) (67 - 91)  ABP: --  ABP(mean): --  RR: 37 (30 Sep 2023 06:00) (19 - 39)  SpO2: 95% (30 Sep 2023 06:00) (86% - 100%)    O2 Parameters below as of 30 Sep 2023 06:00  Patient On (Oxygen Delivery Method): face tent    O2 Concentration (%): 30 09-28 @ 07:01  -  09-29 @ 07:00  --------------------------------------------------------  IN: 1315 mL / OUT: 1400 mL / NET: -85 mL    09-29 @ 07:01  -  09-30 @ 06:38  --------------------------------------------------------  IN: 2010 mL / OUT: 1100 mL / NET: 910 mL      CAPILLARY BLOOD GLUCOSE      POCT Blood Glucose.: 143 mg/dL (30 Sep 2023 06:20)      Physical Exam:   Constitutional: ill appearing, no acute distress   HEENT: + PERRLA, EOMI, no drainage or redness  Neck: supple,  No JVD  Respiratory: diminished breath Sounds equal bilaterally to auscultation, no accessory muscle use noted  Cardiovascular: Regular rate, regular rhythm, normal S1, S2; no murmurs or rub  Gastrointestinal: Soft, non-tender, distended, no hepatosplenomegaly, normal bowel sounds  Extremities: + 2 peripheral edema, no cyanosis, no clubbing   Vascular: Equal and normal pulses: 2+ peripheral pulses throughout  Neurological: alert. awake, hypophonic  Psychiatric: calm, normal mood, normal affect  Musculoskeletal: No joint swelling or deformity; no limitation of movement, weakness noted upper and lower ext.  Skin: warm, dry, well perfused, no rashes, right IJ and right fem sutures in place post ecmo        HOSPITAL MEDICATIONS:  Standing Meds:  albuterol/ipratropium for Nebulization 3 milliLiter(s) Nebulizer every 6 hours  artificial  tears Solution 1 Drop(s) Both EYES every 12 hours  atovaquone  Suspension 1500 milliGRAM(s) Oral daily  cefTRIAXone   IVPB 1000 milliGRAM(s) IV Intermittent every 24 hours  chlorhexidine 2% Cloths 1 Application(s) Topical <User Schedule>  dextrose 5%. 1000 milliLiter(s) IV Continuous <Continuous>  dextrose 50% Injectable 25 Gram(s) IV Push once  dextrose 50% Injectable 12.5 Gram(s) IV Push once  dextrose 50% Injectable 25 Gram(s) IV Push once  enoxaparin Injectable 100 milliGRAM(s) SubCutaneous <User Schedule>  folic acid 1 milliGRAM(s) Oral daily  glucagon  Injectable 1 milliGRAM(s) IntraMuscular once  insulin lispro (ADMELOG) corrective regimen sliding scale   SubCutaneous every 6 hours  insulin NPH human recombinant 6 Unit(s) SubCutaneous every 6 hours  methylPREDNISolone sodium succinate Injectable 80 milliGRAM(s) IV Push daily  metoprolol tartrate 25 milliGRAM(s) Oral two times a day  midodrine 20 milliGRAM(s) Oral every 8 hours  multivitamin/minerals/iron Oral Solution (CENTRUM) 15 milliLiter(s) Oral daily  pantoprazole  Injectable 40 milliGRAM(s) IV Push daily  potassium phosphate IVPB 15 milliMole(s) IV Intermittent once  senna Syrup 10 milliLiter(s) Oral at bedtime  vancomycin  IVPB      vancomycin  IVPB 1500 milliGRAM(s) IV Intermittent every 12 hours      PRN Meds:  dextrose Oral Gel 15 Gram(s) Oral once PRN  sodium chloride 0.65% Nasal 1 Spray(s) Both Nostrils every 8 hours PRN  tranexamic acid Injectable for Nebulization 500 milliGRAM(s) Inhalation every 8 hours PRN      LABS:                        10.5   27.96 )-----------( 299      ( 30 Sep 2023 00:58 )             32.5     Hgb Trend: 10.5<--, 10.3<--, 10.6<--, 11.6<--, 10.4<--  09-30    136  |  99  |  36<H>  ----------------------------<  150<H>  4.2   |  28  |  0.44<L>    Ca    9.4      30 Sep 2023 00:58  Phos  2.3     09-30  Mg     2.20     09-30    TPro  5.2<L>  /  Alb  2.8<L>  /  TBili  11.7<H>  /  DBili  x   /  AST  141<H>  /  ALT  209<H>  /  AlkPhos  771<H>  09-30    Creatinine Trend: 0.44<--, 0.45<--, 0.46<--, 0.44<--, 0.43<--, 0.49<--  PT/INR - ( 29 Sep 2023 00:25 )   PT: 12.1 sec;   INR: 1.08 ratio         PTT - ( 29 Sep 2023 00:25 )  PTT:36.2 sec  Urinalysis Basic - ( 30 Sep 2023 00:58 )    Color: x / Appearance: x / SG: x / pH: x  Gluc: 150 mg/dL / Ketone: x  / Bili: x / Urobili: x   Blood: x / Protein: x / Nitrite: x   Leuk Esterase: x / RBC: x / WBC x   Sq Epi: x / Non Sq Epi: x / Bacteria: x        Venous Blood Gas:  09-30 @ 00:58  7.50/39/101/30/98.8  VBG Lactate: 2.1  Venous Blood Gas:  09-29 @ 00:25  7.47/48/45/35/74.4  VBG Lactate: 1.1      MICROBIOLOGY:     Culture - Blood (collected 27 Sep 2023 19:50)  Source: .Blood Blood-Peripheral  Preliminary Report (30 Sep 2023 01:02):    No growth at 48 Hours    Culture - Urine (collected 27 Sep 2023 18:26)  Source: Catheterized Catheterized  Final Report (28 Sep 2023 21:25):    No growth    Culture - Blood (collected 27 Sep 2023 14:30)  Source: .Blood Blood-Peripheral  Preliminary Report (29 Sep 2023 20:01):    No growth at 48 Hours      RADIOLOGY:  [ ] Reviewed and interpreted by me           Interval Events:  -Episodes of anxiety and delirium, requiring redirection  -TXA neb for slow anterior nasal bleed      HPI:  62 year old female with a past medical history of afib, and sciatica, who presented to [facility] for shortness of breath. She had gone to [facility] where she had CXR which showed bilateral lower lobe infiltrates so was sent to ER. She had been having exertional dyspnea and hypoxemia (on home pulse ox to 82%). She had been having dyspnea for several months and had a workup in [place] with a CT scan (which was negative for PE). She also states that she was seen by a pulmonologist and rheumatology, where they ruled out lupus and other immunological disorders.    [facility] Hospital Course  Found to be hypoxic and have interstitial lung disease appearance on CT, admitted 8/26 for AHRF, further ILD workup and management. TTE 8/26 with normal LVEF. Pt was started on prednisone on admission with gradually improving O2 requirement and has been stable on 2-3LNC since 9/3. Started steroid taper on 9/6 and fully transitioned to PO 9/8 overnight. Started on Mycophenolate mofetil (cellcept) 9/8 evening but pt reported subjective side effects with weakness. Episode of AF w RVR with HR up to 140s on 9/11 am, decreased to HR 10-110s with IV lopressor 10. CTA negative for PE and showed interval improvements in GGO but possible lingular bleb and RML bronchiectasis. Patient received 2L of but before more fluids and beta-blocker pt developed heart palpitations with EKG HR 170s with SVT. BPs 105/70s. Did not respond to vagal maneuvers. Pt given oral lopressor 12.5 and IV lopressor 5, with improvement of HR to 130s. SBP briefly dropped to 60-70s then recovered. Patient on NRB offered HFNC/BiPAP but refused. Started on empiric vancomycin and zosyn. BCx w/ NGTD. Per pulm increased solumedrol to 40mg qd. Patient acceded to HFNC in which she desatted and presented with increased wob for which she was transitioned to BiPAP. Patient with anxiety while on BiPAP presenting tachypnea and desatting to the 80s despite verbal redirection for which she was administered ativan 1mg IVP x1. Patient distress improved with sats in the low 90s but had desaturation to 70s. STAT CXR showed increased pulmonary congestion for which patient was administered lasix without improvement. Intubated and started on mechanical ventilation but required very high PEEP (18) and 100% FiO2 and still had low saturations. VV ECMO team consulted and accepted to [facility]. Per signout patient had RV enlargement and dysfunction on POCUS with concern for either new PE vs high pulmonary pressures due to PEEP 18 and lung dysfunction. Patient went for cannulation at [facility] with flouro showing no acute PE. Cannulated with 25 F drainage cannula in R Fem V and 23F “arterial” cannula in RIJ.    (13 Sep 2023 15:24)    SUBJECTIVE: Patient seen and examined at bedside. Complained of abdominal pain, maalox ordered,  as per patient has helped with discomfort previously.    REVIEW OF SYSTEMS:  [x] All other systems negative  [ ] Unable to assess ROS because ________    OBJECTIVE:  ICU Vital Signs Last 24 Hrs  T(C): 37.1 (30 Sep 2023 06:00), Max: 37.1 (29 Sep 2023 08:00)  T(F): 98.8 (30 Sep 2023 06:00), Max: 98.8 (29 Sep 2023 20:00)  HR: 106 (30 Sep 2023 06:00) (61 - 106)  BP: 111/62 (30 Sep 2023 06:00) (111/58 - 141/75)  BP(mean): 72 (30 Sep 2023 06:00) (67 - 91)  ABP: --  ABP(mean): --  RR: 37 (30 Sep 2023 06:00) (19 - 39)  SpO2: 95% (30 Sep 2023 06:00) (86% - 100%)    O2 Parameters below as of 30 Sep 2023 06:00  Patient On (Oxygen Delivery Method): face tent    O2 Concentration (%): 30 09-28 @ 07:01  -  09-29 @ 07:00  --------------------------------------------------------  IN: 1315 mL / OUT: 1400 mL / NET: -85 mL    09-29 @ 07:01 - 09-30 @ 06:38  --------------------------------------------------------  IN: 2010 mL / OUT: 1100 mL / NET: 910 mL      CAPILLARY BLOOD GLUCOSE      POCT Blood Glucose.: 143 mg/dL (30 Sep 2023 06:20)      Physical Exam:   Constitutional: ill appearing, no acute distress   HEENT: + PERRLA, EOMI, no drainage or redness. scleral icterus b/l  Neck: supple,  No JVD  Respiratory: diminished breath Sounds equal bilaterally to auscultation, no accessory muscle use noted  Cardiovascular: Regular rate, regular rhythm, normal S1, S2; no murmurs or rub  Gastrointestinal: Soft, non-tender, distended, no hepatosplenomegaly, normal bowel sounds  Extremities: + 2 peripheral edema, no cyanosis, no clubbing   Vascular: Equal and normal pulses: 2+ peripheral pulses throughout  Neurological: alert. awake, hypophonic  Psychiatric: calm, normal mood, normal affect  Musculoskeletal: No joint swelling or deformity; no limitation of movement, weakness noted upper and lower ext.  Skin: warm, dry, well perfused, no rashes, right IJ and right fem sutures in place post ecmo, right groin wound w/o noted drainage        HOSPITAL MEDICATIONS:  Standing Meds:  albuterol/ipratropium for Nebulization 3 milliLiter(s) Nebulizer every 6 hours  artificial  tears Solution 1 Drop(s) Both EYES every 12 hours  atovaquone  Suspension 1500 milliGRAM(s) Oral daily  cefTRIAXone   IVPB 1000 milliGRAM(s) IV Intermittent every 24 hours  chlorhexidine 2% Cloths 1 Application(s) Topical <User Schedule>  dextrose 5%. 1000 milliLiter(s) IV Continuous <Continuous>  dextrose 50% Injectable 25 Gram(s) IV Push once  dextrose 50% Injectable 12.5 Gram(s) IV Push once  dextrose 50% Injectable 25 Gram(s) IV Push once  enoxaparin Injectable 100 milliGRAM(s) SubCutaneous <User Schedule>  folic acid 1 milliGRAM(s) Oral daily  glucagon  Injectable 1 milliGRAM(s) IntraMuscular once  insulin lispro (ADMELOG) corrective regimen sliding scale   SubCutaneous every 6 hours  insulin NPH human recombinant 6 Unit(s) SubCutaneous every 6 hours  methylPREDNISolone sodium succinate Injectable 80 milliGRAM(s) IV Push daily  metoprolol tartrate 25 milliGRAM(s) Oral two times a day  midodrine 20 milliGRAM(s) Oral every 8 hours  multivitamin/minerals/iron Oral Solution (CENTRUM) 15 milliLiter(s) Oral daily  pantoprazole  Injectable 40 milliGRAM(s) IV Push daily  potassium phosphate IVPB 15 milliMole(s) IV Intermittent once  senna Syrup 10 milliLiter(s) Oral at bedtime  vancomycin  IVPB      vancomycin  IVPB 1500 milliGRAM(s) IV Intermittent every 12 hours      PRN Meds:  dextrose Oral Gel 15 Gram(s) Oral once PRN  sodium chloride 0.65% Nasal 1 Spray(s) Both Nostrils every 8 hours PRN  tranexamic acid Injectable for Nebulization 500 milliGRAM(s) Inhalation every 8 hours PRN      LABS:                        10.5   27.96 )-----------( 299      ( 30 Sep 2023 00:58 )             32.5     Hgb Trend: 10.5<--, 10.3<--, 10.6<--, 11.6<--, 10.4<--  09-30    136  |  99  |  36<H>  ----------------------------<  150<H>  4.2   |  28  |  0.44<L>    Ca    9.4      30 Sep 2023 00:58  Phos  2.3     09-30  Mg     2.20     09-30    TPro  5.2<L>  /  Alb  2.8<L>  /  TBili  11.7<H>  /  DBili  x   /  AST  141<H>  /  ALT  209<H>  /  AlkPhos  771<H>  09-30    Creatinine Trend: 0.44<--, 0.45<--, 0.46<--, 0.44<--, 0.43<--, 0.49<--  PT/INR - ( 29 Sep 2023 00:25 )   PT: 12.1 sec;   INR: 1.08 ratio         PTT - ( 29 Sep 2023 00:25 )  PTT:36.2 sec  Urinalysis Basic - ( 30 Sep 2023 00:58 )    Color: x / Appearance: x / SG: x / pH: x  Gluc: 150 mg/dL / Ketone: x  / Bili: x / Urobili: x   Blood: x / Protein: x / Nitrite: x   Leuk Esterase: x / RBC: x / WBC x   Sq Epi: x / Non Sq Epi: x / Bacteria: x        Venous Blood Gas:  09-30 @ 00:58  7.50/39/101/30/98.8  VBG Lactate: 2.1  Venous Blood Gas:  09-29 @ 00:25  7.47/48/45/35/74.4  VBG Lactate: 1.1      MICROBIOLOGY:     Culture - Blood (collected 27 Sep 2023 19:50)  Source: .Blood Blood-Peripheral  Preliminary Report (30 Sep 2023 01:02):    No growth at 48 Hours    Culture - Urine (collected 27 Sep 2023 18:26)  Source: Catheterized Catheterized  Final Report (28 Sep 2023 21:25):    No growth    Culture - Blood (collected 27 Sep 2023 14:30)  Source: .Blood Blood-Peripheral  Preliminary Report (29 Sep 2023 20:01):    No growth at 48 Hours      RADIOLOGY:  [ ] Reviewed and interpreted by me

## 2023-10-01 LAB
ALBUMIN SERPL ELPH-MCNC: 2.9 G/DL — LOW (ref 3.3–5)
ALBUMIN SERPL ELPH-MCNC: 3 G/DL — LOW (ref 3.3–5)
ALBUMIN SERPL ELPH-MCNC: 3.2 G/DL — LOW (ref 3.3–5)
ALP SERPL-CCNC: 624 U/L — HIGH (ref 40–120)
ALP SERPL-CCNC: 649 U/L — HIGH (ref 40–120)
ALP SERPL-CCNC: 654 U/L — HIGH (ref 40–120)
ALT FLD-CCNC: 177 U/L — HIGH (ref 4–33)
ALT FLD-CCNC: 181 U/L — HIGH (ref 4–33)
ALT FLD-CCNC: SIGNIFICANT CHANGE UP U/L (ref 4–33)
ANION GAP SERPL CALC-SCNC: 10 MMOL/L — SIGNIFICANT CHANGE UP (ref 7–14)
ANION GAP SERPL CALC-SCNC: 13 MMOL/L — SIGNIFICANT CHANGE UP (ref 7–14)
ANION GAP SERPL CALC-SCNC: 14 MMOL/L — SIGNIFICANT CHANGE UP (ref 7–14)
AST SERPL-CCNC: 105 U/L — HIGH (ref 4–32)
AST SERPL-CCNC: 87 U/L — HIGH (ref 4–32)
AST SERPL-CCNC: SIGNIFICANT CHANGE UP U/L (ref 4–32)
BILIRUB SERPL-MCNC: 10.2 MG/DL — HIGH (ref 0.2–1.2)
BILIRUB SERPL-MCNC: 9.4 MG/DL — HIGH (ref 0.2–1.2)
BILIRUB SERPL-MCNC: 9.7 MG/DL — HIGH (ref 0.2–1.2)
BUN SERPL-MCNC: 33 MG/DL — HIGH (ref 7–23)
BUN SERPL-MCNC: 33 MG/DL — HIGH (ref 7–23)
BUN SERPL-MCNC: 35 MG/DL — HIGH (ref 7–23)
CALCIUM SERPL-MCNC: 9.3 MG/DL — SIGNIFICANT CHANGE UP (ref 8.4–10.5)
CALCIUM SERPL-MCNC: 9.5 MG/DL — SIGNIFICANT CHANGE UP (ref 8.4–10.5)
CALCIUM SERPL-MCNC: 9.5 MG/DL — SIGNIFICANT CHANGE UP (ref 8.4–10.5)
CHLORIDE SERPL-SCNC: 96 MMOL/L — LOW (ref 98–107)
CHLORIDE SERPL-SCNC: 96 MMOL/L — LOW (ref 98–107)
CHLORIDE SERPL-SCNC: 97 MMOL/L — LOW (ref 98–107)
CO2 SERPL-SCNC: 26 MMOL/L — SIGNIFICANT CHANGE UP (ref 22–31)
CO2 SERPL-SCNC: 28 MMOL/L — SIGNIFICANT CHANGE UP (ref 22–31)
CO2 SERPL-SCNC: 32 MMOL/L — HIGH (ref 22–31)
CREAT SERPL-MCNC: 0.38 MG/DL — LOW (ref 0.5–1.3)
CREAT SERPL-MCNC: 0.43 MG/DL — LOW (ref 0.5–1.3)
CREAT SERPL-MCNC: 0.44 MG/DL — LOW (ref 0.5–1.3)
EGFR: 108 ML/MIN/1.73M2 — SIGNIFICANT CHANGE UP
EGFR: 109 ML/MIN/1.73M2 — SIGNIFICANT CHANGE UP
EGFR: 112 ML/MIN/1.73M2 — SIGNIFICANT CHANGE UP
GLUCOSE BLDC GLUCOMTR-MCNC: 125 MG/DL — HIGH (ref 70–99)
GLUCOSE BLDC GLUCOMTR-MCNC: 175 MG/DL — HIGH (ref 70–99)
GLUCOSE BLDC GLUCOMTR-MCNC: 247 MG/DL — HIGH (ref 70–99)
GLUCOSE SERPL-MCNC: 119 MG/DL — HIGH (ref 70–99)
GLUCOSE SERPL-MCNC: 146 MG/DL — HIGH (ref 70–99)
GLUCOSE SERPL-MCNC: 244 MG/DL — HIGH (ref 70–99)
LMWH PPP CHRO-ACNC: 1.08 IU/ML — SIGNIFICANT CHANGE UP (ref 0.5–1.1)
MAGNESIUM SERPL-MCNC: 2.5 MG/DL — SIGNIFICANT CHANGE UP (ref 1.6–2.6)
NT-PROBNP SERPL-SCNC: 847 PG/ML — HIGH
PHOSPHATE SERPL-MCNC: 2.7 MG/DL — SIGNIFICANT CHANGE UP (ref 2.5–4.5)
PHOSPHATE SERPL-MCNC: 3.1 MG/DL — SIGNIFICANT CHANGE UP (ref 2.5–4.5)
PHOSPHATE SERPL-MCNC: SIGNIFICANT CHANGE UP MG/DL (ref 2.5–4.5)
POTASSIUM SERPL-MCNC: 4.3 MMOL/L — SIGNIFICANT CHANGE UP (ref 3.5–5.3)
POTASSIUM SERPL-MCNC: 4.4 MMOL/L — SIGNIFICANT CHANGE UP (ref 3.5–5.3)
POTASSIUM SERPL-MCNC: SIGNIFICANT CHANGE UP MMOL/L (ref 3.5–5.3)
POTASSIUM SERPL-SCNC: 4.3 MMOL/L — SIGNIFICANT CHANGE UP (ref 3.5–5.3)
POTASSIUM SERPL-SCNC: 4.4 MMOL/L — SIGNIFICANT CHANGE UP (ref 3.5–5.3)
POTASSIUM SERPL-SCNC: SIGNIFICANT CHANGE UP MMOL/L (ref 3.5–5.3)
PROT SERPL-MCNC: 5.2 G/DL — LOW (ref 6–8.3)
PROT SERPL-MCNC: 5.4 G/DL — LOW (ref 6–8.3)
PROT SERPL-MCNC: SIGNIFICANT CHANGE UP G/DL (ref 6–8.3)
SODIUM SERPL-SCNC: 135 MMOL/L — SIGNIFICANT CHANGE UP (ref 135–145)
SODIUM SERPL-SCNC: 138 MMOL/L — SIGNIFICANT CHANGE UP (ref 135–145)
SODIUM SERPL-SCNC: 139 MMOL/L — SIGNIFICANT CHANGE UP (ref 135–145)
VANCOMYCIN TROUGH SERPL-MCNC: 21 UG/ML — HIGH (ref 10–20)

## 2023-10-01 PROCEDURE — 78226 HEPATOBILIARY SYSTEM IMAGING: CPT | Mod: 26,GC

## 2023-10-01 PROCEDURE — 99291 CRITICAL CARE FIRST HOUR: CPT

## 2023-10-01 RX ORDER — FUROSEMIDE 40 MG
20 TABLET ORAL ONCE
Refills: 0 | Status: COMPLETED | OUTPATIENT
Start: 2023-10-01 | End: 2023-10-01

## 2023-10-01 RX ORDER — VANCOMYCIN HCL 1 G
1250 VIAL (EA) INTRAVENOUS EVERY 12 HOURS
Refills: 0 | Status: DISCONTINUED | OUTPATIENT
Start: 2023-10-01 | End: 2023-10-01

## 2023-10-01 RX ADMIN — Medication 4: at 12:40

## 2023-10-01 RX ADMIN — ENOXAPARIN SODIUM 100 MILLIGRAM(S): 100 INJECTION SUBCUTANEOUS at 09:17

## 2023-10-01 RX ADMIN — HUMAN INSULIN 6 UNIT(S): 100 INJECTION, SUSPENSION SUBCUTANEOUS at 17:40

## 2023-10-01 RX ADMIN — Medication 2: at 17:40

## 2023-10-01 RX ADMIN — Medication 250 MILLIGRAM(S): at 06:17

## 2023-10-01 RX ADMIN — Medication 3 MILLILITER(S): at 10:17

## 2023-10-01 RX ADMIN — Medication 1 MILLIGRAM(S): at 16:32

## 2023-10-01 RX ADMIN — PANTOPRAZOLE SODIUM 40 MILLIGRAM(S): 20 TABLET, DELAYED RELEASE ORAL at 12:45

## 2023-10-01 RX ADMIN — Medication 80 MILLIGRAM(S): at 06:17

## 2023-10-01 RX ADMIN — Medication 1 DROP(S): at 19:00

## 2023-10-01 RX ADMIN — MIDODRINE HYDROCHLORIDE 20 MILLIGRAM(S): 2.5 TABLET ORAL at 16:31

## 2023-10-01 RX ADMIN — CHLORHEXIDINE GLUCONATE 1 APPLICATION(S): 213 SOLUTION TOPICAL at 06:18

## 2023-10-01 RX ADMIN — Medication 25 MILLIGRAM(S): at 20:08

## 2023-10-01 RX ADMIN — Medication 20 MILLIGRAM(S): at 17:39

## 2023-10-01 RX ADMIN — Medication 1 DROP(S): at 06:18

## 2023-10-01 RX ADMIN — Medication 15 MILLILITER(S): at 16:31

## 2023-10-01 RX ADMIN — ENOXAPARIN SODIUM 100 MILLIGRAM(S): 100 INJECTION SUBCUTANEOUS at 21:09

## 2023-10-01 RX ADMIN — Medication 3 MILLILITER(S): at 16:32

## 2023-10-01 RX ADMIN — ATOVAQUONE 1500 MILLIGRAM(S): 750 SUSPENSION ORAL at 16:31

## 2023-10-01 RX ADMIN — HUMAN INSULIN 6 UNIT(S): 100 INJECTION, SUSPENSION SUBCUTANEOUS at 06:17

## 2023-10-01 RX ADMIN — SENNA PLUS 10 MILLILITER(S): 8.6 TABLET ORAL at 21:09

## 2023-10-01 RX ADMIN — CEFTRIAXONE 100 MILLIGRAM(S): 500 INJECTION, POWDER, FOR SOLUTION INTRAMUSCULAR; INTRAVENOUS at 09:17

## 2023-10-01 RX ADMIN — Medication 3 MILLILITER(S): at 22:57

## 2023-10-01 RX ADMIN — Medication 3 MILLILITER(S): at 03:24

## 2023-10-01 RX ADMIN — MIDODRINE HYDROCHLORIDE 20 MILLIGRAM(S): 2.5 TABLET ORAL at 06:17

## 2023-10-01 NOTE — PROGRESS NOTE ADULT - ASSESSMENT
63 yo F w/ Afib initially presented to urgent care for SOB where she had an XR done concerning for b/l lower infiltrates, sent to [facility] ED and admitted to  on 8/26 after being found to be hypoxemic, reported having  debilitating b/l hand numbness/tingling and some muscles weakness several months. She was found to have interstitial lung disease appearance on CT, with the plan for further ILD workup and management, started on prednisone on admission with gradually improving O2 requirements and stable on 2-3LNC. She underwent bronchoscopy with TBBX on 8/30 which showed Non-Specific Intersitial Disease (NSIP)/OP. Labs notable for positive ARIANA 1:1280, RNP > 8, Alejandro > 8. Patient continued on steroids and received cellcept, which was discontinued 2/2 Afib-RVR. Interval CTA chest on 9/11 also negative PE did show possible lingula pneumonia. Started on empiric vancomycin and zosyn 9/12, course was then c/b episode of SVT to 170s w/ rapidly progressive hypoxemic respiratory failure, placed NRB offered HFNC/BiPAP but refused, leading to worsening hypoxemic respiratory failure requiring intubation on 9/13. Despite best practices, patient remained hypoxemic and patient was cannulated for VV-ECMO on 9/13 and transferred to [facility] for further management. 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 Showed significant lung improvement and was decannulated 9/21. Extubated 9/22.     NEUROLOGY  Home meds: gabapentin 100g TID; holding for now  AA&Ox3 at baseline   - following commands w/ delirium likely iso prolonged hospitalization, sedation, and ICU setting. Now improved; following commands and understands conversation.  - required precedex for anxiety, stopped 9/22  - started seroquel 25 mg qhs, dose may have been too much given hypotension and lethargy, however can restart seroquel at lower dose of 12.5mg qhs given delirium episode overnight  - CTH 9/14 no acute findings  - PT/OT following - c/w kandi lift to chair twice daily, increase activity as tolerated    ENT  #Epistaxis   Small amount of intermittent bleeding noted in b/l nares and hemoptysis?  - Will continue with afrin and nebulized TXA prn; once able to tolerate food, we will remove the NG tube  - humidified air provided via face tent  - Nasal saline spray 1 spray each nose PRN   - Afrin 1 spray for rebleeding followed by direct pressure on the nose, if still actively bleeding, call ENT for reassessment  - monitor hgb and for signs of overt bleeding closely       PULMONARY  Admitted to [facility] on 8/26 after p/w SOB, found to be hypoxemic, required 2-4L NC/bibap, initially responded well to IV steroids. 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 2/2 bacterial PNA vs atypical PNA vs aspiration vs AEILD continued to show significant improvement from a lung perspective and was decannulated 9/21, and extubated to HFNC 9/22, now tolerating 50% face tent.  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   - 9/20 BAL culture: normal respiratory selvin  - continue duonebs  - s/p empiric abx   - s/p decannulation on 9/21 with plan for suture removal ~7-10 days   - extubated 9/22 to HFNC   - weaned from HFNC to 6L nasal cannula, given epistaxis currently on face tent. Will continue with face tent until epistaxis fully resolves.  - CT 9/30 w/reticular peripheral lung opacities, LL atelectasis, and bilateral GGO's, improved compared to prior image (9/14) findings that showed extensive lung consolidations    CARDIOVASCULAR  Hx of Afib w at OSH, started on Amiodarone gtt now in shock state requiring requiring pressors likely septic with component of vaspoplegia i/s/o sedation, possible contribution of cardiogenic from RV dysfunction. Now having episodes of SVT responsive to lopressor   - No PE seen on invasive pulmonary angiography at time of ECMO cannulation or in recent imaging  - NO2 weaned off  (9/15) RV function remains unchanged  - s/p milrinone, off since (9/13)  - converted to sinus (9/14) discontinued amio gtt iso bradycardia, back to AF with RVR on 9/19 when sedation weaned off  - continue 25mg metoprolol BID for rate control   - Phenylephrine off since (9/25), continue midodrine 20mg q8 hours; will titrate down as tolerated  - RITCHIE 9/14 : No MEREDITH thrombus noted, negative for PFO. LVOT diameter of 2 cm  - TTE 9/12 with EF 65-70% with normal LV and RV  - TTE 9/14 with  EF 18%. Severe global LV systolic dysfunction. RV enlargement with decreased RV systolic function, however RITCHIE and recent POCUS shows normal LV systolic function, improved RV systolic function   - TTE 9/19 with recovered EF to 67% but still with RV enlargement and systolic dysfunction      GASTROINTESTINAL  Transaminase elevation likely 2/2 combination of shock liver, malposition of ECMO cannula and liver dysfunction  - ALK/ AST/ALT downtrending but remain elevated [id number]/375670, T.bili peaked 19.3 mostly direct, and now 11.7  - Acute hepatitis panel negative  - RUQ US 9/15 shows fatty infiltration of liver, negative for hepatobiliary pathology, repeat RUQ US performed 9/22 for worsening LFT's and rising bili with findings negative as well  - Hepatology consulted - likely shock liver with expected delay in improvement in bilirubin  - Hypertriglyceridemia 836, hx of fatty liver and propofol gtt, s/p insulin gtt, now improving  - Given previous watery bowel movements; will continue with senna only at this time. Last BM 9/29   - failed dysphagia screen x2 currently with KFT in place tolerating tube feeds, plan for cineesophagogram   - nutrition following- recommend changing TF formula to Peptamen to decrease GI discomfort  - CT abdomen 9/15 w/o bowel obstruction, noted with colonic diverticulosis mostly in sigmoid, w/o evidence of diverticulitis  - CT abdomen 9/30 shows distended gallbladder w/sludge, can obtain HIDA scan iso persistent leukocytosis with c/o intermittent abdominal pain       RENAL  sCr baseline 0.78  - Creatinine wnl  - I/O's:  negative -7.4L/LOS and negative -365ml/24 hours  - s/p diuresis with lasix, now given PRN. Last lasix 20mg dose 9/30, goal keep net even to slightly negative  - good UO with primafit  - hypernatremia improved, free H2O discontinued 9/21      INFECTIOUS DISEASE  Leukocytosis  - Afebrile, however WBC continues to uptrend, likely iso filgrastim (initiated 9/17-9/21 for leukopenia) vs infection?   - Cultures sent 9/27 negative to date, sputum culture pending  - right wound s/p ecmo cannula site ?infected will start vancomycin for now (9/29-   ) and culture area, next vanco level 10/2 at 1700  - UA with many bacteria, currently on CTX 1gm IVPB daily (9/27-  )   - previous cultures, BAL, cytopathology so far negative  - leukopenia now resolved s/p filgrastim (9/17-9/21)  - Bactrim DS discontinued iso leukopenia, continue Mepron for PJP prophylaxis instead  - s/p IV Ceftriaxone 5 days? at [facility] out of an abundance of precaution to cover BAL specimen  - s/p zosyn at OSH (9/12) for lingula PNA  - vancomycin (9/12-9/15 ) d/c'd negative mrsa PCR, and azithro (9/14-9/15) d/c'd neg Urine legionella  - s/p empiric donald (9/13-9/20) now that ANC >1.5  - vanco restarted for ppx on 9/18 d/t leukopenia but stopped on 9/20  - positive COVID-19 antigen in late August, repeat RVP negative 9/30  - ID following- f/u recs  - CT C/A/P 9/30 did not show any infectious etiology that may be contributing to leukocytosis    ENDOCRINE  # Hyperglycemia i/s/o steroids  Admission A1c 6.1  - s/p insulin gtt, transitioned to NPH 11 w/ISS,  NPH was lowered to 5u units, and now increased to 6u w/mod ISS given glucose >200 iso increase TF rate and hyperglycemia  - continue to adjust insulin and ISS as steroids are tapered and diet changes  - elevated triglycerides 835, has hx of fatty liver, had been on propofol gtt. TG downtrending since initiating insulin gtt for hyperglycemia, now normalizing off propofol  - TSH low initially at 0.13 repeat normal     HEME  #Right IJ thrombus   -s/p argatroban gtt , transitioned to lovenox   - Lowered lovenox dose from 120mg BID to 100mg BID based on elevated Anti Xa level (1.14), plan to redraw Anti Xa today 10/01/23  - INR elevated likely iso argatroban and liver dysfxn, s/p Vit. K (9/14) and FFP x1 (9/15) - now resolved  - platelets x 7 for thrombocytopenia <50,000, last transfused  9/21 continues to improve  - Intermittent episodes of epistaxis, no overt bleeding noted. Hgb remains stable     #Leukopenia  #Thrombocytopenia  - Heme consult placed for thrombocytopenia, noted to also be leukopenic but now resolved   - unlikely HLH/MAS since many abnormalities can be explained by severe hypoxemia/hypotension during initial decompensation prior to ecmo cannulation but some features that are consistent and with rheumatic disease it is a possibility to f/u hematology regarding utility of further lab/pathologic testing  - peripheral blood smear reviewed: large platelets noted, no platelet clumps, no blast cells noted, neutrophils noted w/ normal number of lobes, nucleated RBC noted  - acute hepatitis panel negative  - s/p filgrastim 480 daily  given ANC >1500 in the setting of possible infxn   - Thrombocytopenia refractory after intermittent transfusions will monitor now off circuit, platelet count continues to improve    #R/O DVT  - LE duplex negative for DVT   - first VA duplex post decannulation 9/21 shows non-occlusive deep vein thrombosis in the right jugular vein and the right cephalic is non-compressible  - c/w AC      MSK  Onset of debilitating b/l hand cramps and some muscles weakness x several months, unclear etiology, radiculopathy? vs myositis?   - MRI outpatient reportedly showed cervical spine issues, started on Prednisone shortly before admission at OSH  - myomarker panel + for RNP and Ku  - seen by neurologist at [facility]   - symptoms improved with cellcept (9/8-9/11) but discontinued given Afib RVR   - f/u with Dr. Nik Marie or Dante Urbano for EMG upon discharge (osh?)      RHEUM  - started on Solumedrol 60mg q6h from 9/1 to 9/6 then weaned over time to then Medrol 32 mg 9/9 to 9/12 at [facility]  - s/p Cellcept 9/8 to 9/11 but stopped due to Afib- RVR/tachycardia   - CT findings, Improved/decreased extent of bilateral ground glass opacities and reticular markings compared to the previous study. Findings are nonspecific but differential etiologies include interstitial pneumonia, drug toxicity, nonspecific connective tissue disorders.  - OSH labs show strongly positive ARIANA, RNP, and anti smith antibodies with low C4 and normal C3. 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  - s/p rituximab 9/16/23  - plan for next rituximab dose was for ~9/30/23 but will hold off for now given patient currently receiving antibiotics       SKIN  Reportedly had single episode of painful rash on her shins, ears and neck and chest which resolved with a steroid cream from a dermatologist prior to admission  - no evidence of rash currently  - right groin with breakdown s/p cannula placement may now be source of infection, will culture and consult wound care         PROPHYLAXIS  # DVT: Lovenox  # GI: TF      LINES  -Ecmo Cannulas 9/13-9/21  -[facility] TLC- 9/13-9/21  -Left Ax [place] - 9/13-9/27 (placed at OSH)  -RA 16g PIV x2- 9/15      GOC  -Palliative Following  -full code  -partner Kiara at bedside daily and updated on care      63 yo F w/ Afib initially presented to urgent care for SOB where she had an XR done concerning for b/l lower infiltrates, sent to [facility] ED and admitted to  on 8/26 after being found to be hypoxemic, reported having  debilitating b/l hand numbness/tingling and some muscles weakness several months. She was found to have interstitial lung disease appearance on CT, with the plan for further ILD workup and management, started on prednisone on admission with gradually improving O2 requirements and stable on 2-3LNC. She underwent bronchoscopy with TBBX on 8/30 which showed Non-Specific Intersitial Disease (NSIP)/OP. Labs notable for positive ARIANA 1:1280, RNP > 8, Alejandro > 8. Patient continued on steroids and received cellcept, which was discontinued 2/2 Afib-RVR. Interval CTA chest on 9/11 also negative PE did show possible lingula pneumonia. Started on empiric vancomycin and zosyn 9/12, course was then c/b episode of SVT to 170s w/ rapidly progressive hypoxemic respiratory failure, placed NRB offered HFNC/BiPAP but refused, leading to worsening hypoxemic respiratory failure requiring intubation on 9/13. Despite best practices, patient remained hypoxemic and patient was cannulated for VV-ECMO on 9/13 and transferred to [facility] for further management. 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 Showed significant lung improvement and was decannulated 9/21. Extubated 9/22.     NEUROLOGY  Home meds: gabapentin 100g TID; holding for now  AA&Ox3 at baseline   - following commands w/ delirium likely iso prolonged hospitalization, sedation, and ICU setting. Now improved; following commands and understands conversation.  - required precedex for anxiety, stopped 9/22  - seroquel 25mg QHS d/c'd, dose may have caused lethargy and hypotension, can give at lower dose of 12.5mg for delirium/anxiety, currently does not require any meds  - CTH 9/14 no acute findings  - PT/OT following - c/w kandi lift to chair twice daily, increase activity as tolerated    ENT  #Epistaxis   Small amount of intermittent bleeding noted in b/l nares and hemoptysis?  - Will continue with afrin and nebulized TXA prn; once able to tolerate food, we will remove the NG tube  - humidified air provided via face tent  - Nasal saline spray 1 spray each nose PRN   - Afrin 1 spray for rebleeding followed by direct pressure on the nose, if still actively bleeding, call ENT for reassessment  - monitor hgb and for signs of overt bleeding closely       PULMONARY  Admitted to [facility] on 8/26 after p/w SOB, found to be hypoxemic, required 2-4L NC/bibap, initially responded well to IV steroids. 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 2/2 bacterial PNA vs atypical PNA vs aspiration vs AEILD continued to show significant improvement from a lung perspective and was decannulated 9/21, and extubated to HFNC 9/22, now tolerating 50% face tent.  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   - 9/20 BAL culture: normal respiratory selvin  - continue duonebs  - s/p decannulation on 9/21 with plan for suture removal ~7-10 days   - extubated 9/22 to HFNC   - weaned from HFNC to 6L nasal cannula, given epistaxis currently on face tent. Will continue with face tent until epistaxis fully resolves.  - CT 9/30 w/reticular peripheral lung opacities, LL atelectasis, and bilateral GGO's, improved compared to prior image (9/14) findings that showed extensive lung consolidations    CARDIOVASCULAR  Hx of Afib w at OSH, started on Amiodarone gtt now in shock state requiring requiring pressors likely septic with component of vaspoplegia i/s/o sedation, possible contribution of cardiogenic from RV dysfunction. Now having episodes of SVT responsive to lopressor   - No PE seen on invasive pulmonary angiography at time of ECMO cannulation or in recent imaging  - NO2 weaned off  (9/15) RV function remains unchanged  - s/p milrinone, off since (9/13)  - converted to sinus (9/14) discontinued amio gtt iso bradycardia, back to AF with RVR on 9/19 when sedation weaned off  - continue 25mg metoprolol BID for rate control   - Phenylephrine off since (9/25), continue midodrine 20mg q8 hours; will titrate down as tolerated  - RITCHIE 9/14 : No MEREDITH thrombus noted, negative for PFO. LVOT diameter of 2 cm  - TTE 9/12 with EF 65-70% with normal LV and RV  - TTE 9/14 with  EF 18%. Severe global LV systolic dysfunction. RV enlargement with decreased RV systolic function, however RITCHIE and recent POCUS shows normal LV systolic function, improved RV systolic function   - TTE 9/19 with recovered EF to 67% but still with RV enlargement and systolic dysfunction      GASTROINTESTINAL  Transaminase elevation likely 2/2 combination of shock liver, malposition of ECMO cannula and liver dysfunction  - ALK/ AST/ALT downtrending but remain elevated 624/105/177, T.bili peaked 19.3 mostly direct, and now 9.4  - Acute hepatitis panel negative  - RUQ US 9/15 shows fatty infiltration of liver, negative for hepatobiliary pathology, repeat RUQ US performed 9/22 for worsening LFT's and rising bili with findings negative as well  - Hepatology consulted - likely shock liver with expected delay in improvement in bilirubin  - Hypertriglyceridemia 836, hx of fatty liver and propofol gtt, s/p insulin gtt, now improving  - Given previous watery bowel movements; will continue with senna only at this time. Last BM 9/29   - failed dysphagia screen x2 currently with KFT in place tolerating tube feeds, plan for cineesophagogram   - nutrition following- recommend changing TF formula to Peptamen to decrease GI discomfort  - CT abdomen 9/15 w/o bowel obstruction, noted with colonic diverticulosis mostly in sigmoid, w/o evidence of diverticulitis  - CT abdomen 9/30 w/distended gallbladder w/sludge, can obtain HIDA scan, given persistent leukocytosis with c/o intermittent abdominal pain, concern for perc maggie      RENAL  sCr baseline 0.78  - Creatinine wnl  - I/O's:  negative -7.4L/LOS and negative -365ml/24 hours  - s/p diuresis with lasix, now given PRN. Will give lasix today, goal keep net negative -1 Liter 24 hours  - good UO with primafit  - hypernatremia improved, free H2O discontinued 9/21      INFECTIOUS DISEASE  Leukocytosis  - Afebrile, however WBC continues to uptrend, likely iso filgrastim (initiated 9/17-9/21 for leukopenia) vs infection?   - Cultures sent 9/27 negative to date, sputum culture pending  - right wound s/p ecmo cannula site, wound culture pending  - UA+ with many bacteria, however urine culture negative, will continue CTX 1gm IVPB daily (9/27-  )   - previous cultures, BAL, cytopathology so far negative  - leukopenia now resolved s/p filgrastim (9/17-9/21)  - Bactrim DS discontinued iso leukopenia, continue Mepron for PJP prophylaxis instead  - s/p IV Ceftriaxone 5 days? at [facility] out of an abundance of precaution to cover BAL specimen  - s/p zosyn at OSH (9/12) for lingula PNA  - vancomycin (9/12-9/15 ) d/c'd negative mrsa PCR, and azithro (9/14-9/15) d/c'd neg Urine legionella  - s/p empiric donald (9/13-9/20) now that ANC >1.5  - vanco restarted for ppx on 9/18 d/t leukopenia but stopped on 9/20  - positive COVID-19 antigen in late August, repeat RVP negative 9/30  - ID following- f/u recs  - CT C/A/P 9/30 did not show any infectious etiology that may be contributing to leukocytosis    ENDOCRINE  # Hyperglycemia i/s/o steroids  Admission A1c 6.1  - s/p insulin gtt, transitioned to NPH 11 w/ISS,  NPH was lowered to 5u units, and now increased to 6u w/mod ISS given glucose >200 iso increase TF rate and hyperglycemia  - continue to adjust insulin and ISS as steroids are tapered and diet changes  - elevated triglycerides 835, has hx of fatty liver, had been on propofol gtt. TG downtrending since initiating insulin gtt for hyperglycemia, now normalizing off propofol  - TSH low initially at 0.13 repeat normal     HEME  #Right IJ thrombus   -s/p argatroban gtt , transitioned to lovenox   - Lowered lovenox dose from 120mg BID to 100mg BID based on elevated Anti Xa level (1.14), plan to redraw Anti Xa today 10/01/23  - INR elevated likely iso argatroban and liver dysfxn, s/p Vit. K (9/14) and FFP x1 (9/15) - now resolved  - platelets x 7 for thrombocytopenia <50,000, last transfused  9/21 continues to improve  - Intermittent episodes of epistaxis, no overt bleeding noted. Hgb remains stable     #Leukopenia  #Thrombocytopenia  - Heme consult placed for thrombocytopenia, noted to also be leukopenic but now resolved   - unlikely HLH/MAS since many abnormalities can be explained by severe hypoxemia/hypotension during initial decompensation prior to ecmo cannulation but some features that are consistent and with rheumatic disease it is a possibility to f/u hematology regarding utility of further lab/pathologic testing  - peripheral blood smear reviewed: large platelets noted, no platelet clumps, no blast cells noted, neutrophils noted w/ normal number of lobes, nucleated RBC noted  - acute hepatitis panel negative  - s/p filgrastim 480 daily  given ANC >1500 in the setting of possible infxn   - Thrombocytopenia refractory after intermittent transfusions will monitor now off circuit, platelet count continues to improve    #R/O DVT  - LE duplex negative for DVT   - first VA duplex post decannulation 9/21 shows non-occlusive deep vein thrombosis in the right jugular vein and the right cephalic is non-compressible  - c/w AC      MSK  Onset of debilitating b/l hand cramps and some muscles weakness x several months, unclear etiology, radiculopathy? vs myositis?   - MRI outpatient reportedly showed cervical spine issues, started on Prednisone shortly before admission at OSH  - myomarker panel + for RNP and Ku  - seen by neurologist at [facility]   - symptoms improved with cellcept (9/8-9/11) but discontinued given Afib RVR   - f/u with Dr. Nik Marie or Dante Urbano for EMG upon discharge (osh?)      RHEUM  - started on Solumedrol 60mg q6h from 9/1 to 9/6 then weaned over time to then Medrol 32 mg 9/9 to 9/12 at [facility]  - s/p Cellcept 9/8 to 9/11 but stopped due to Afib- RVR/tachycardia   - CT findings, Improved/decreased extent of bilateral ground glass opacities and reticular markings compared to the previous study. Findings are nonspecific but differential etiologies include interstitial pneumonia, drug toxicity, nonspecific connective tissue disorders.  - OSH labs show strongly positive ARIANA, RNP, and anti smith antibodies with low C4 and normal C3. 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  - s/p rituximab 9/16/23  - plan for next rituximab dose was for ~9/30/23 but will hold off for now given patient currently receiving antibiotics       SKIN  Reportedly had single episode of painful rash on her shins, ears and neck and chest which resolved with a steroid cream from a dermatologist prior to admission  - no evidence of rash currently  - right groin with breakdown s/p cannula placement may now be source of infection, will culture and consult wound care         PROPHYLAXIS  # DVT: Lovenox  # GI: TF      LINES  -Ecmo Cannulas 9/13-9/21  -LIJ TLC- 9/13-9/21  -Left Ax [place] - 9/13-9/27 (placed at OSH)  -RA 16g PIV x2- 9/15      GOC  -Palliative Following  -full code  -partner Kiara at bedside daily and updated on care      63 yo F w/ Afib initially presented to urgent care for SOB where she had an XR done concerning for b/l lower infiltrates, sent to [facility] ED and admitted to  on 8/26 after being found to be hypoxemic, reported having  debilitating b/l hand numbness/tingling and some muscles weakness several months. She was found to have interstitial lung disease appearance on CT, with the plan for further ILD workup and management, started on prednisone on admission with gradually improving O2 requirements and stable on 2-3LNC. She underwent bronchoscopy with TBBX on 8/30 which showed Non-Specific Intersitial Disease (NSIP)/OP. Labs notable for positive ARIANA 1:1280, RNP > 8, Alejandro > 8. Patient continued on steroids and received cellcept, which was discontinued 2/2 Afib-RVR. Interval CTA chest on 9/11 also negative PE did show possible lingula pneumonia. Started on empiric vancomycin and zosyn 9/12, course was then c/b episode of SVT to 170s w/ rapidly progressive hypoxemic respiratory failure, placed NRB offered HFNC/BiPAP but refused, leading to worsening hypoxemic respiratory failure requiring intubation on 9/13. Despite best practices, patient remained hypoxemic and patient was cannulated for VV-ECMO on 9/13 and transferred to [facility] for further management. 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 Showed significant lung improvement and was decannulated 9/21. Extubated 9/22.     NEUROLOGY  Home meds: gabapentin 100g TID; holding for now  AA&Ox3 at baseline   - following commands w/ delirium likely iso prolonged hospitalization, sedation, and ICU setting. Now improved; following commands and understands conversation.  - required precedex for anxiety, stopped 9/22  - seroquel 25mg QHS d/c'd, dose may have caused lethargy and hypotension, can give at lower dose of 12.5mg for delirium/anxiety, currently does not require any meds  - CTH 9/14 no acute findings  - PT/OT following - c/w kandi lift to chair twice daily, increase activity as tolerated    ENT  #Epistaxis   Small amount of intermittent bleeding noted in b/l nares and hemoptysis?  - Will continue with afrin and nebulized TXA prn; once able to tolerate food, we will remove the NG tube  - humidified air provided via face tent  - Nasal saline spray 1 spray each nose PRN   - Afrin 1 spray for rebleeding followed by direct pressure on the nose, if still actively bleeding, call ENT for reassessment  - monitor hgb and for signs of overt bleeding closely       PULMONARY  Admitted to [facility] on 8/26 after p/w SOB, found to be hypoxemic, required 2-4L NC/bibap, initially responded well to IV steroids. 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 2/2 bacterial PNA vs atypical PNA vs aspiration vs AEILD continued to show significant improvement from a lung perspective and was decannulated 9/21, and extubated to HFNC 9/22, now tolerating 50% face tent.  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   - 9/20 BAL culture: normal respiratory selvin  - continue duonebs  - s/p decannulation on 9/21 with plan for suture removal ~7-10 days   - extubated 9/22 to HFNC   - weaned from HFNC to 6L nasal cannula, given epistaxis currently on face tent. Will continue with face tent until epistaxis fully resolves.  - CT 9/30 w/reticular peripheral lung opacities, LL atelectasis, and bilateral GGO's, improved compared to prior image (9/14) findings that showed extensive lung consolidations    CARDIOVASCULAR  Hx of Afib w at OSH, started on Amiodarone gtt now in shock state requiring requiring pressors likely septic with component of vaspoplegia i/s/o sedation, possible contribution of cardiogenic from RV dysfunction. Now having episodes of SVT responsive to lopressor   - No PE seen on invasive pulmonary angiography at time of ECMO cannulation or in recent imaging  - NO2 weaned off  (9/15) RV function remains unchanged  - s/p milrinone, off since (9/13)  - converted to sinus (9/14) discontinued amio gtt iso bradycardia, back to AF with RVR on 9/19 when sedation weaned off  - continue 25mg metoprolol BID for rate control   - Phenylephrine off since (9/25), continue midodrine 20mg q8 hours; will titrate down as tolerated  - RITCHIE 9/14 : No MEREDITH thrombus noted, negative for PFO. LVOT diameter of 2 cm  - TTE 9/12 with EF 65-70% with normal LV and RV  - TTE 9/14 with  EF 18%. Severe global LV systolic dysfunction. RV enlargement with decreased RV systolic function, however RITCHIE and recent POCUS shows normal LV systolic function, improved RV systolic function   - TTE 9/19 with recovered EF to 67% but still with RV enlargement and systolic dysfunction      GASTROINTESTINAL  Transaminase elevation likely 2/2 combination of shock liver, malposition of ECMO cannula and liver dysfunction  - ALK/ AST/ALT downtrending but remain elevated 624/105/177, T.bili peaked 19.3 mostly direct, and now 9.4  - Acute hepatitis panel negative  - RUQ US 9/15 shows fatty infiltration of liver, negative for hepatobiliary pathology, repeat RUQ US performed 9/22 for worsening LFT's and rising bili with findings negative as well  - Hepatology consulted - likely shock liver with expected delay in improvement in bilirubin  - Hypertriglyceridemia 836, hx of fatty liver and propofol gtt, s/p insulin gtt, now improving  - Given previous watery bowel movements; will continue with senna only at this time. Last BM 9/29   - failed dysphagia screen x2 currently with KFT in place tolerating tube feeds, plan for cineesophagogram   - nutrition following- recommend changing TF formula to Peptamen to decrease GI discomfort  - CT abdomen 9/15 w/o bowel obstruction, noted with colonic diverticulosis mostly in sigmoid, w/o evidence of diverticulitis  - CT abdomen 9/30 w/ sigmoid diverticulosis, without evidence of acute diverticulitis, w/distended gallbladder w/sludge   - HIDA scan 10/01 obtained given recent imaging, leukocytosis and abdominal discomfort, however no evidence found of acute cholecystitis or biliary obstruction       RENAL  sCr baseline 0.78  - Creatinine wnl  - I/O's:  negative -7.4L/LOS and negative -365ml/24 hours  - s/p diuresis with lasix, now given PRN. Will give lasix today, goal to keep net negative up to 1 Liter 24 hours  - good UO with primafit  - hypernatremia improved, free H2O discontinued 9/21      INFECTIOUS DISEASE  Leukocytosis  - Afebrile, however WBC continues to uptrend, likely iso filgrastim (initiated 9/17-9/21 for leukopenia) vs infection?    - blood cultures negative to date, sputum and right groin (s/p ecmo cannula site) wound culture pending  - UA+ with many bacteria, however urine culture negative, will continue CTX 1gm IVPB daily (9/27-  ) for now,  Vanco started empirically (9/29-10/1) now discontinued  - CT imaging CAP 9/30 and HIDA scan 10/01 obtained, findings with no evidence of infection/abscess  - previous cultures, BAL, cytopathology so far negative  - leukopenia now resolved s/p filgrastim (9/17-9/21)  - Bactrim DS discontinued iso leukopenia, continue Mepron for PJP prophylaxis instead  - s/p IV Ceftriaxone 5 days? at [facility] out of an abundance of precaution to cover BAL specimen  - s/p zosyn at OSH (9/12) for lingula PNA  - vancomycin (9/12-9/15 ) d/c'd negative mrsa PCR, and azithro (9/14-9/15) d/c'd neg Urine legionella  - s/p empiric donald (9/13-9/20) now that ANC >1.5  - vanco restarted for ppx on 9/18 d/t leukopenia but stopped on 9/20  - positive COVID-19 antigen in late August, repeat RVP negative 9/30  - ID following- f/u recs      ENDOCRINE  # Hyperglycemia i/s/o steroids  Admission A1c 6.1  - s/p insulin gtt, transitioned to NPH 11 w/ISS,  NPH was lowered to 5u units, and now increased to 6u w/mod ISS given glucose >200 iso increase TF rate and hyperglycemia  - continue to adjust insulin and ISS as steroids are tapered and diet changes  - elevated triglycerides 835, has hx of fatty liver, had been on propofol gtt. TG downtrending since initiating insulin gtt for hyperglycemia, now normalizing off propofol  - TSH low initially at 0.13 repeat normal     HEME  #Right IJ thrombus   - s/p argatroban gtt , transitioned to lovenox   - Lowered lovenox dose from 120mg BID to 100mg BID based on elevated Anti Xa level (1.14), level redrawn with 100mg BID dose resulted within normal range: 1.08   - INR elevated likely iso argatroban and liver dysfxn, s/p Vit. K (9/14) and FFP x1 (9/15) - now resolved  - platelets x 7 for thrombocytopenia <50,000, last transfused  9/21 continues to improve  - Intermittent episodes of epistaxis, no overt bleeding noted. Hgb remains stable     #Leukopenia  #Thrombocytopenia  - Heme consult placed for thrombocytopenia, noted to also be leukopenic but now resolved   - unlikely HLH/MAS since many abnormalities can be explained by severe hypoxemia/hypotension during initial decompensation prior to ecmo cannulation but some features that are consistent and with rheumatic disease it is a possibility to f/u hematology regarding utility of further lab/pathologic testing  - peripheral blood smear reviewed: large platelets noted, no platelet clumps, no blast cells noted, neutrophils noted w/ normal number of lobes, nucleated RBC noted  - acute hepatitis panel negative  - s/p filgrastim 480 daily  given ANC >1500 in the setting of possible infxn   - Thrombocytopenia refractory after intermittent transfusions will monitor now off circuit, platelet count continues to improve    #R/O DVT  - LE duplex negative for DVT   - first VA duplex post decannulation 9/21 shows non-occlusive deep vein thrombosis in the right jugular vein and the right cephalic is non-compressible  - continue AC      MSK  Onset of debilitating b/l hand cramps and some muscles weakness x several months, unclear etiology, radiculopathy? vs myositis?   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 Nik Marie or Dante Urbano for EMG upon discharge (osh?)      RHEUM  - started on Solumedrol 60mg q6h from 9/1 to 9/6 then weaned over time to then Medrol 32 mg 9/9 to 9/12 at [facility]  - s/p Cellcept 9/8 to 9/11 but stopped due to Afib- RVR/tachycardia   - CT findings, Improved/decreased extent of bilateral ground glass opacities and reticular markings compared to the previous study. Findings are nonspecific but differential etiologies include interstitial pneumonia, drug toxicity, nonspecific connective tissue disorders.  - OSH labs show strongly positive ARIANA, RNP, and anti smith antibodies with low C4 and normal C3. 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  - s/p rituximab 9/16/23  - plan for next rituximab dose was for ~9/30/23 but will hold off for now given patient currently receiving antibiotics       SKIN  Reportedly had single episode of painful rash on her shins, ears and neck and chest which resolved with a steroid cream from a dermatologist prior to admission  - no evidence of rash currently  - right groin with breakdown s/p cannula placement may now be source of infection, will culture and consult wound care         PROPHYLAXIS  # DVT: Lovenox  # GI: TF      LINES  -Ecmo Cannulas 9/13-9/21  -LIJ TLC- 9/13-9/21  -Left Ax [place] - 9/13-9/27 (placed at OSH)  -RA 16g PIV x2- 9/15      GOC  -Palliative Following  -full code  -partner Kiara at bedside daily and updated on care      61 yo F w/ Afib initially presented to urgent care for SOB where she had an XR done concerning for b/l lower infiltrates, sent to [facility] ED and admitted to  on 8/26 after being found to be hypoxemic, reported having  debilitating b/l hand numbness/tingling and some muscles weakness several months. She was found to have interstitial lung disease appearance on CT, with the plan for further ILD workup and management, started on prednisone on admission with gradually improving O2 requirements and stable on 2-3LNC. She underwent bronchoscopy with TBBX on 8/30 which showed Non-Specific Intersitial Disease (NSIP)/OP. Labs notable for positive ARIANA 1:1280, RNP > 8, Alejandro > 8. Patient continued on steroids and received cellcept, which was discontinued 2/2 Afib-RVR. Interval CTA chest on 9/11 also negative PE did show possible lingula pneumonia. Started on empiric vancomycin and zosyn 9/12, course was then c/b episode of SVT to 170s w/ rapidly progressive hypoxemic respiratory failure, placed NRB offered HFNC/BiPAP but refused, leading to worsening hypoxemic respiratory failure requiring intubation on 9/13. Despite best practices, patient remained hypoxemic and patient was cannulated for VV-ECMO on 9/13 and transferred to [facility] for further management. 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 Showed significant lung improvement and was decannulated 9/21. Extubated 9/22.     NEUROLOGY  Home meds: gabapentin 100g TID; holding for now  AA&Ox3 at baseline   - following commands w/ delirium likely iso prolonged hospitalization, sedation, and ICU setting. Now improved; following commands and understands conversation.  - required precedex for anxiety, stopped 9/22  - seroquel 25mg QHS d/c'd, dose may have caused lethargy and hypotension, can give at lower dose of 12.5mg for delirium/anxiety, currently does not require any meds  - CTH 9/14 no acute findings  - C/w aggressive PT/OT - Oob to chair twice daily, currently with kandi lift,  increase activity as tolerated    ENT  #Epistaxis   Small amount of intermittent bleeding noted in b/l nares and hemoptysis?  - Will continue with afrin and nebulized TXA prn; once able to tolerate food, we will remove the NG tube  - humidified air provided via face tent  - Nasal saline spray 1 spray each nose PRN   - Afrin 1 spray for rebleeding followed by direct pressure on the nose, if still actively bleeding, call ENT for reassessment  - monitor hgb and for signs of overt bleeding closely       PULMONARY  Admitted to [facility] on 8/26 after p/w SOB, found to be hypoxemic, required 2-4L NC/bibap, initially responded well to IV steroids. 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 2/2 bacterial PNA vs atypical PNA vs aspiration vs AEILD continued to show significant improvement from a lung perspective and was decannulated 9/21, and extubated to HFNC 9/22, now tolerating 50% face tent.  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   - 9/20 BAL culture: normal respiratory selvin  - continue duonebs  - s/p decannulation on 9/21 with plan for suture removal ~7-10 days   - extubated 9/22 to HFNC   - weaned from HFNC to 6L nasal cannula, given epistaxis currently on face tent. Will continue with face tent until epistaxis fully resolves.  - CT 9/30 w/reticular peripheral lung opacities, LL atelectasis, and bilateral GGO's, improved compared to prior image (9/14) findings that showed extensive lung consolidations    CARDIOVASCULAR  Hx of Afib w at OSH, started on Amiodarone gtt now in shock state requiring requiring pressors likely septic with component of vaspoplegia i/s/o sedation, possible contribution of cardiogenic from RV dysfunction. Now having episodes of SVT responsive to lopressor   - No PE seen on invasive pulmonary angiography at time of ECMO cannulation or in recent imaging  - NO2 weaned off  (9/15) RV function remains unchanged  - s/p milrinone, off since (9/13)  - converted to sinus (9/14) discontinued amio gtt iso bradycardia, back to AF with RVR on 9/19 when sedation weaned off  - continue 25mg metoprolol BID for rate control   - Phenylephrine off since (9/25), continue midodrine 20mg q8 hours; will titrate down as tolerated  - RITCHIE 9/14 : No MEREDITH thrombus noted, negative for PFO. LVOT diameter of 2 cm  - TTE 9/12 with EF 65-70% with normal LV and RV  - TTE 9/14 with  EF 18%. Severe global LV systolic dysfunction. RV enlargement with decreased RV systolic function, however RITCHIE and recent POCUS shows normal LV systolic function, improved RV systolic function   - TTE 9/19 with recovered EF to 67% but still with RV enlargement and systolic dysfunction      GASTROINTESTINAL  Transaminase elevation likely 2/2 combination of shock liver, malposition of ECMO cannula and liver dysfunction  - ALK/ AST/ALT downtrending but remain elevated 624/105/177, T.bili peaked 19.3 mostly direct, and now 9.4  - Acute hepatitis panel negative  - RUQ US 9/15 shows fatty infiltration of liver, negative for hepatobiliary pathology, repeat RUQ US performed 9/22 for worsening LFT's and rising bili with findings negative as well  - Hepatology consulted - likely shock liver with expected delay in improvement in bilirubin  - Hypertriglyceridemia 836, hx of fatty liver and propofol gtt, s/p insulin gtt, now improving  - Given previous watery bowel movements; will continue with senna only at this time. Last BM 9/29   - failed dysphagia screen x2 currently with KFT in place tolerating tube feeds, plan for cineesophagogram as phonation improves  - nutrition following- recommend changing TF formula to Peptamen to decrease GI discomfort  - CT abdomen 9/15 w/o bowel obstruction, noted with colonic diverticulosis mostly in sigmoid, w/o evidence of diverticulitis  - CT abdomen 9/30 w/ sigmoid diverticulosis, without evidence of acute diverticulitis, w/distended gallbladder w/sludge   - HIDA scan 10/01 obtained given recent imaging, leukocytosis and abdominal discomfort, however no evidence found of acute cholecystitis or biliary obstruction       RENAL  sCr baseline 0.78  - Creatinine wnl  - I/O's:  negative -7.4L/LOS and negative -365ml/24 hours  - s/p diuresis with lasix, now given PRN. Will give lasix today, goal to keep net negative up to 1 Liter 24 hours  - good UO with primafit  - hypernatremia improved, free H2O discontinued 9/21      INFECTIOUS DISEASE  Leukocytosis  - Afebrile, however WBC continues to uptrend, likely iso filgrastim (initiated 9/17-9/21 for leukopenia) vs infection?    - blood cultures negative to date, sputum and right groin (s/p ecmo cannula site) wound culture pending  - UA+ with many bacteria, however urine culture negative, will continue CTX 1gm IVPB daily (9/27-  ) for now,  Vanco started empirically (9/29-10/1) now discontinued  - CT imaging CAP 9/30 and HIDA scan 10/01 obtained, findings with no evidence of infection/abscess  - previous cultures, BAL, cytopathology so far negative  - leukopenia now resolved s/p filgrastim (9/17-9/21)  - Bactrim DS discontinued iso leukopenia, continue Mepron for PJP prophylaxis instead  - s/p IV Ceftriaxone 5 days? at [facility] out of an abundance of precaution to cover BAL specimen  - s/p zosyn at OSH (9/12) for lingula PNA  - vancomycin (9/12-9/15 ) d/c'd negative mrsa PCR, and azithro (9/14-9/15) d/c'd neg Urine legionella  - s/p empiric donald (9/13-9/20) now that ANC >1.5  - vanco restarted for ppx on 9/18 d/t leukopenia but stopped on 9/20  - positive COVID-19 antigen in late August, repeat RVP negative 9/30  - ID following- f/u recs      ENDOCRINE  # Hyperglycemia i/s/o steroids  Admission A1c 6.1  - s/p insulin gtt, transitioned to NPH 11 w/ISS,  NPH was lowered to 5u units, and now increased to 6u w/mod ISS given glucose >200 iso increase TF rate and hyperglycemia  - continue to adjust insulin and ISS as steroids are tapered and diet changes  - elevated triglycerides 835, has hx of fatty liver, had been on propofol gtt. TG downtrending since initiating insulin gtt for hyperglycemia, now normalizing off propofol  - TSH low initially at 0.13 repeat normal     HEME  #Right IJ thrombus   - s/p argatroban gtt , transitioned to lovenox   - Lowered lovenox dose from 120mg BID to 100mg BID based on elevated Anti Xa level (1.14), level redrawn with 100mg BID dose resulted within normal range: 1.08   - INR elevated likely iso argatroban and liver dysfxn, s/p Vit. K (9/14) and FFP x1 (9/15) - now resolved  - platelets x 7 for thrombocytopenia <50,000, last transfused  9/21 continues to improve  - Intermittent episodes of epistaxis, no overt bleeding noted. Hgb remains stable     #Leukopenia  #Thrombocytopenia  - Heme consult placed for thrombocytopenia, noted to also be leukopenic but now resolved   - unlikely HLH/MAS since many abnormalities can be explained by severe hypoxemia/hypotension during initial decompensation prior to ecmo cannulation but some features that are consistent and with rheumatic disease it is a possibility to f/u hematology regarding utility of further lab/pathologic testing  - peripheral blood smear reviewed: large platelets noted, no platelet clumps, no blast cells noted, neutrophils noted w/ normal number of lobes, nucleated RBC noted  - acute hepatitis panel negative  - s/p filgrastim 480 daily  given ANC >1500 in the setting of possible infxn   - Thrombocytopenia refractory after intermittent transfusions will monitor now off circuit, platelet count continues to improve    #R/O DVT  - LE duplex negative for DVT   - first [facility] duplex post decannulation 9/21 shows non-occlusive deep vein thrombosis in the right jugular vein and the right cephalic is non-compressible  - continue AC      MSK  Onset of debilitating b/l hand cramps and some muscles weakness x several months, unclear etiology, radiculopathy? vs myositis?   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   - symptoms improved with cellcept (9/8-9/11) but discontinued given Afib RVR   - f/u with Dr. Nik Marie or Dante Urbano for EMG upon discharge (osh?)      RHEUM  - started on Solumedrol 60mg q6h from 9/1 to 9/6 then weaned over time to then Medrol 32 mg 9/9 to 9/12 at [facility]  - s/p Cellcept 9/8 to 9/11 but stopped due to Afib- RVR/tachycardia   - CT findings, Improved/decreased extent of bilateral ground glass opacities and reticular markings compared to the previous study. Findings are nonspecific but differential etiologies include interstitial pneumonia, drug toxicity, nonspecific connective tissue disorders.  - OSH labs show strongly positive ARIANA, RNP, and anti smith antibodies with low C4 and normal C3. 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  - s/p rituximab 9/16/23  - plan for next rituximab dose was for ~9/30/23 but will hold off for now given patient currently receiving antibiotics       SKIN  Reportedly had single episode of painful rash on her shins, ears and neck and chest which resolved with a steroid cream from a dermatologist prior to admission  - no evidence of rash currently  - right groin with breakdown s/p cannula placement may now be source of infection, will culture and consult wound care         PROPHYLAXIS  # DVT: Lovenox  # GI: TF      LINES  -Ecmo Cannulas 9/13-9/21  -[facility]J TLC- 9/13-9/21  -Left Ax Traci - 9/13-9/27 (placed at OSH)  -RA 16g PIV x2- 9/15      GOC  -Palliative Following  -full code  -partner Kiara at bedside daily and updated on care

## 2023-10-01 NOTE — PROGRESS NOTE ADULT - CRITICAL CARE ATTENDING COMMENT
62 F with afib, recently diagnosed with MCTD-ILD (anti RNP) admitted to [facility] with acute hypoxemic respiratory failure c/b acute hypoxemic and hypercapnic respiratory failure requiring intubation and VVECMO on 9/13, tx to [facility], concerning for DAH vs ILD flare    More alert today, lung compliance significantly improving.  on minimal ecmo settings    #neuro - continue to lighten precedex as tolerated    #CV: keep lopressor at 12.5 mg po bid; might've been sedation/med related, resolving now.  TTE LV normal.    #pulm: lung compliance improving on PC PS 12 PEEP 12 40%, will switch to PS as tolerated given -400s; on 40%, ARDS resolving as abg 7.49/41/86 on fio2 40$ with circuit fd02 21%    # ECMO: Pt abg 7.49/42/254 on cilley test (fdo2 21% sweep 0) and vent as above (ecmo flow 3.7 lpm), delta pressure in 20s, neg pressure 40s; discussed with CT surgeon, plan for ecmo decannulation today    #ID: repeat MRSA swab negative, f/u bronch culture, may d/c vanco, donald to stop today given improvement in ANC    #heme: unclear etiology of pancytopenia ([facility] labs sent vs critical illness); s/p neupogen, monitor for now, transfuse plt goal > 50, Hgb goal ~9-10; ANC improving as is platelets; c/w argatroban for now    #rheum: lungs significantly improved, plan for 3 plex sessions and slow steroid taper, received rituxan Sunday and receiving last (3rd session 9/20); will d/w rheum re steroid plan and rituxan plan; repeat bronch 9/20 still with some DAH but unclear if it is a capillaritis process or not    #renal: c/w fluid removal with hemoconcentrator to keep overall even to net negative 500 cc, monitor electrolytes; keep even from fluid standpoint post ecmo decannulation    #endo: c/w basal bolus    #GI: ast/alt improving, bilirubin may lag behind as per hepatology, will monitor for now; tolerating feeds, having BMs with bowel regimen    ECMO settings reviewed, including Pre and Post membrane ABGs, transmembrane pressures, flow, oxygenator function, and cannula positions. Discussed with team, including nursing and perfusion.  Total time spent on VV ECMO management was 15 minutes, separate from time spent on critical care management, procedures, and teaching.
62 F with afib, recently diagnosed with MCTD-ILD (anti RNP) admitted to [facility] with acute hypoxemic respiratory failure c/b acute hypoxemic and hypercapnic respiratory failure requiring intubation and VV- ECMO on 9/13, tx to [facility], concerning for DAH vs ILD flare, decannulated from V-V ecmo 9/21, extubated morning of 9/22 to HFNC    Patient brought to [facility] for ARDs Requiring V-V ECMO. Serologies positive for mainly MCTD-ILD, s/p pulse, PLEX and dose of RTX. Concern of also pulmonary edema. Now improved decannulated and extubated.     Still with post ECMO delirium. WBC increased but in the setting of Filgrastim. Bii improved, still on HFNC. Failed Bedside dysphagia, pending official S/S eval. Runs of SVT overnight.     # acute hypoxemic respiratory failure  # MCTD-ILD  # left IJ DVT   # encephalopathy  # transaminitis/hyperbilirubinemia  # immunosuppressed  # SVT  # oropharyngeal dysphagia   - C/W HFNC, wean as tolerated, CXR ordered today.   - SVT overnight, resolved, monitor on tele, was given IVP lopressor. Restart PO lopressor. Hold amiodarone 2/2 to LFT elevation.   - Pending official S/S, place NGT today, d/w patient and partner at bedside. Need for meds and feeds  - C/W steroids, taper likely next week. S/P plex, and RTX. Pending repeat dose on the 30th  - WBC likely 2/2 to Filgrastim, given immunosuppression will monitor closely for infection.  - Bili improving, will continue to trend, recall hepatology if continued to be elevated, Repeat US dopplars negative.   - C/W argatroban gtt, given IJ thrombus  - Continue to re-orient, likely ICU/post ecmo delirium. Seroquel/ Melatonin QHS.   - C/W PT/OT.   - DVT ppx- on full a/c  - Dispo- full code. D/W Partner at bedside.
Patient is a 63 yo F with afib, recently diagnosed with MCTD-ILD (anti RNP) admitted to [facility] with acute hypoxemic respiratory failure c/b acute hypoxemic and hypercapnic respiratory failure requiring intubation and VV- ECMO on 9/13, then transferred to [facility], concerning for DAH vs ILD flare, decannulated from VV-ECMO 9/21, extubated 9/22 to HFNC.    Patient brought to [facility] for ARDs requiring VV-ECMO. Serologies positive for mainly MCTD-ILD, s/p pulse dose steroids, PLEX (9/15, 9/18, 9/20) and dose of Rituxan (9/16). Course c/b severe leukopenia s/p filgastrim, shock liver with slow to resolved hyperbilirubinemia, and recurrent SVT.    # Acute hypoxemic respiratory failure  # MCTD-ILD  # RIJ DVT   # Ancephalopathy  # Transaminitis/hyperbilirubinemia  # Immunosuppressed  # SVT  # Oropharyngeal dysphagia   # Shock  - c/w HFNC, wean as tolerated  - c/w bronchodilators and chest PT  - Still with intermitten episodes of SVT requiring transient increase in vasopressors, responsive to Metoprolol IV pushes. Will increase PO metoprolol to 25mg BID. Hold amiodarone 2/2 to LFT elevation. Intermittently appears to have wandering atrial pacemaker, will repeat EKG if noticed again  - Offical S+S today with recommendation of NPO for now, pending MBS. c/w tube feeds via NGT   - c/w Prednisone 1mg/kg for now. Will f/u rheum re taper, will need slow taper, and for steroid sparing agent. Pending repeat dose of Rituxan on 9/30  - Leukocytosis likely 2/2 to Filgrastim, given immunosuppression will monitor closely for infection.  - Trend LFTs, T Bili improving. Will recall hepatology if continued to be elevated, Repeat US w/ dopplers notable for distended GB w/ sludge but no thrombus  - c/w AC for RIJ thrombus, will change to Lovenox  - c/w Seroquel, decrease to 12.5mg qHS   - c/w PT/OT    Jozef Borden MD  Pulmonary & Critical Care
Patient is a 63 yo F with afib, recently diagnosed with MCTD-ILD (anti RNP) admitted to [facility] with acute hypoxemic respiratory failure c/b acute hypoxemic and hypercapnic respiratory failure requiring intubation and VV- ECMO on 9/13, then transferred to [facility], concerning for DAH vs ILD flare, decannulated from VV-ECMO 9/21, extubated 9/22.    Patient brought to [facility] for ARDs requiring VV-ECMO. Serologies positive for mainly MCTD-ILD, s/p pulse dose steroids, PLEX (9/15, 9/18, 9/20) and dose of Rituxan (9/16). Course c/b severe leukopenia s/p filgastrim, shock liver with slow to resolved hyperbilirubinemia, and recurrent SVT.    # Acute hypoxemic respiratory failure  # MCTD-ILD  # RIJ DVT   # Ancephalopathy  # Transaminitis/hyperbilirubinemia  # Immunosuppressed  # SVT  # Oropharyngeal dysphagia   # Shock  # Epistaxis  - c/w supplemental O2, wean as tolerated  - c/w bronchodilators and chest PT  - c/w PO metoprolol for recurrent SVT, improved. Hold amiodarone 2/2 to LFT elevation.   - Offical S+S 9/25 with recommendation of NPO for now, pending MBS. Will recall speech and swallow now improved phonation and strength. c/w tube feeds via NGT   - c/w Prednisone 1mg/kg for now. Will f/u rheum re taper, will need slow taper, and for steroid sparing agent. Pending repeat dose of Rituxan on 9/30  - Leukocytosis likely 2/2 to Filgrastim, given immunosuppression will monitor closely for infection. f/u repeat cultures  - Ceftriaxone started yesterday for bacteria in UA, will continue for now  - Trend LFTs, fluctuating but overall improved. Will recall hepatology if continued to be elevated, Repeat US w/ dopplers notable for distended GB w/ sludge but no thrombus  - c/w AC w/ Lovenox for RIJ thrombus. AntiXa level checked  - c/w aggressive PT/OT, OOB to chair or bed in chair mode as toelerated  - Incentive spirometer  - c/w Afrin and PRN nebulized TXA for epistaxis, still with slow bleeding    Jozef Borden MD  Pulmonary & Critical Care
61 yo F with afib, recently diagnosed with MCTD-ILD (anti RNP) admitted to [facility] with acute hypoxemic respiratory failure c/b acute hypoxemic and hypercapnic respiratory failure requiring intubation and VV- ECMO on 9/13, then transferred to [facility], concerning for DAH vs ILD flare, decannulated from VV-ECMO 9/21, extubated 9/22.    Patient brought to [facility] for ARDs requiring VV-ECMO. Serologies positive for mainly MCTD-ILD, s/p pulse dose steroids, PLEX (9/15, 9/18, 9/20) and dose of Rituxan (9/16). Course c/b severe leukopenia s/p filgastrim, shock liver with slow to resolved hyperbilirubinemia, and recurrent SVT.    CT yesterday with some GGO and abd showing distended gall bladder.    # Acute hypoxemic respiratory failure  # MCTD-ILD  # RIJ DVT   # Encephalopathy  # Transaminitis/hyperbilirubinemia  # Immunosuppressed  # SVT  # Oropharyngeal dysphagia   # Shock  # Epistaxis  - c/w supplemental O2, wean as tolerated.  continue with diuresis to keep net negative  - c/w bronchodilators and chest PT  - start low dose seroquel qhs prn for acute metabolic encephalopathy   - c/w PO metoprolol for recurrent SVT, improved. Hold amiodarone 2/2 to LFT elevation.   - Official S+S 9/25 with recommendation of NPO for now, pending MBS. Will reassess with cineesophagogram as per speech and swallow when improved phonation and strength. c/w tube feeds via NGT   - c/w Prednisone 1mg/kg for now. Depending on infectious workup, may give rituxan during week  - ongoing leukocytosis, will get ct c/a/p, evaluate R groin as well  - c/w broad abx but can d/c vanco  - LFTs somewhat improving; given gall bladder findings and leukocytosis along with bilirubin, will get HIDA scan today to evaluate need for perc maggie  - c/w AC w/ Lovenox for RIJ thrombus. Will check AntiXa level today   - c/w aggressive PT/OT, OOB to chair or bed in chair mode as tolerated  - Incentive spirometer  - c/w Afrin and PRN nebulized TXA for epistaxis, bleeding resolving      Critically ill patient requiring frequent bedside visits with therapy changes.
62 F with afib, recently diagnosed with MCTD-ILD (anti RNP) admitted to [facility] with acute hypoxemic respiratory failure c/b acute hypoxemic and hypercapnic respiratory failure requiring intubation and VVECMO on 9/13, tx to [facility], concerning for DAH vs ILD flare    Dilaudid stopped yesterday, just on precedex.  No acute issues this AM    #neuro - will try lightening precedex, Dilaudid just turned off, may need more time to wake up but is following commands (open eyes, squeeze hands)    # CV: having afib now, maybe while waking up, will try lopressor, off vasopressors; repeat TTE    # pulm: lung compliance improving on PC PS 15 PEEP 12 40%, will switch to PS as tolerated given -400s; hold off on bronchoscopy for now until after planned third session of plex tomorrow    # ECMO: Pt abg 7.54/44/123 on Sweep 2 FdO2 100% flow 3.7 lpm, delta pressure in 20s, neg pressure 40s; will decrease Fd02 as tolerated, repeat abg, may be able to come down on sweep, too while on PS;     #ID: repeat MRSA swab, get sputum culture, may d/c vanco, donald to stop in coming days, c/w mepron for neutropenic ppx    #heme: unclear etiology of pancytopenia ([facility] labs sent vs critical illness); s/p neupogen, monitor for now, transfuse plt goal > 50, Hgb goal ~9-10; check HIT/BEN, c/w argatroban for now    #rheum: lungs significantly improved, plan for 3 plex sessions and slow steroid taper, received rituxan Sunday but received plex yesterday, will d/w rheum re steroid plan and rituxan plan; repeat bronch to evaluate for DAH after next PLEX session    #renal: c/w fluid removal with hemoconcentrator to keep overall even to net negative 500 cc, monitor electrolytes    #endo: c/w insulin gtt, may switch to basal/bolus    #GI: ast/alt improving, bilirubin may lag behind as per hepatology, will monitor for now; tolerating feeds, having BMs with bowel regimen    ECMO settings reviewed, including Pre and Post membrane ABGs, transmembrane pressures, flow, oxygenator function, and cannula positions. Discussed with team, including nursing and perfusion.  Total time spent on VV ECMO management was 15 minutes, separate from time spent on critical care management, procedures, and teaching.    Critically ill patient requiring frequent bedside visits with therapy changes.
62 F with afib, recently diagnosed with MCTD-ILD (anti RNP) admitted to [facility] with acute hypoxemic respiratory failure c/b acute hypoxemic and hypercapnic respiratory failure requiring intubation and VVECMO on 9/13, tx to [facility], concerning for DAH vs ILD flare, decannulated from vvecmo 9/21, extubated morning of 9/22 to HFNC    patient alert, following commands, but delirious    #neuro - continue to lighten precedex as tolerated, likely acute metabolic encephalopathy causing delirium, supportive care    #CV: keep lopressor at 12.5 mg po bid; might've been sedation/med related, resolving now.  TTE LV normal.    #pulm: lung compliance improving; c/w HFNC for now, avoid acute pulmonary edema, wean HFNC as tolerated     # ECMO: has acute R IJ thrombus post decannulation so will c/w systemic a/c    #ID: repeat MRSA swab negative, f/u bronch culture, may d/c vanco, stop donald    #heme: unclear etiology of pancytopenia ([facility] labs sent vs critical illness); s/p neupogen, monitor for now, transfuse plt goal > 50, Hgb goal ~9-10; ANC improving as is platelets; c/w argatroban for now    #rheum: lungs significantly improved, plan for 3 plex sessions and slow steroid taper, received rituxan Sunday and receiving last (3rd session 9/20); will d/w rheum re steroid plan and rituxan plan; repeat bronch 9/20 still with some DAH but unclear if it is a capillaritis process or not, c/w solumedrol 80 mg iv daily for now, mepron for pcp ppx    #renal: keep net even from fluid standpoint    #endo: c/w basal bolus but adjust for npo    #GI: ast/alt lateral but bilirubin increasing, monitor for now, restart tube feeds vs s/s eval as tolerated    Critically ill patient requiring frequent bedside visits with therapy changes.
Patient is a 61 yo F with afib, recently diagnosed with MCTD-ILD (anti RNP) admitted to [facility] with acute hypoxemic respiratory failure c/b acute hypoxemic and hypercapnic respiratory failure requiring intubation and VV- ECMO on 9/13, then transferred to [facility], concerning for DAH vs ILD flare, decannulated from VV-ECMO 9/21, extubated 9/22.    Patient brought to [facility] for ARDs requiring VV-ECMO. Serologies positive for mainly MCTD-ILD, s/p pulse dose steroids, PLEX (9/15, 9/18, 9/20) and dose of Rituxan (9/16). Course c/b severe leukopenia s/p filgastrim, shock liver with slow to resolved hyperbilirubinemia, and recurrent SVT.    # Acute hypoxemic respiratory failure  # MCTD-ILD  # RIJ DVT   # Encephalopathy  # Transaminitis/hyperbilirubinemia  # Immunosuppressed  # SVT  # Oropharyngeal dysphagia   # Shock  # Epistaxis  - c/w supplemental O2, wean as tolerated  - c/w bronchodilators and chest PT  - c/w PO metoprolol for recurrent SVT, improved. Hold amiodarone 2/2 to LFT elevation.   - Offical S+S 9/25 with recommendation of NPO for now, pending MBS. Will reassess with cinesophagram as per speech and swallow when improved phonation and strength. c/w tube feeds via NGT   - c/w Prednisone 1mg/kg for now. Will f/u rheum re taper, will need slow taper, and for steroid sparing agent. Pending repeat dose of Rituxan, originally on 9/30 but may delay given now on abx  - Leukocytosis likely 2/2 to Filgrastim and possible infection. Started empirically on Ceftriaxone 9/27 for concern for UTI. Also reported drainage from R fem, prior cannula site. f/u cultures, will send wound cultures. Wound care eval  - c/w Ceftriaxone. Will add Vanc for now  - Trend LFTs, fluctuating but overall improved. Will recall hepatology if continued to be elevated, Repeat US w/ dopplers notable for distended GB w/ sludge but no thrombus  - c/w AC w/ Lovenox for RIJ thrombus. Will check AntiXa level this AM  - c/w aggressive PT/OT, OOB to chair or bed in chair mode as tolerated  - Incentive spirometer  - c/w Afrin and PRN nebulized TXA for epistaxis, still with slow bleeding    Jozef Borden MD  Pulmonary & Critical Care
Patient is a 63 yo F with afib, recently diagnosed with MCTD-ILD (anti RNP) admitted to [facility] with acute hypoxemic respiratory failure c/b acute hypoxemic and hypercapnic respiratory failure requiring intubation and VV- ECMO on 9/13, then transferred to [facility], concerning for DAH vs ILD flare, decannulated from VV-ECMO 9/21, extubated 9/22 to HFNC.    Patient brought to [facility] for ARDs requiring VV-ECMO. Serologies positive for mainly MCTD-ILD, s/p pulse dose steroids, PLEX (9/15, 9/18, 9/20) and dose of Rituxan (9/16). Course c/b severe leukopenia s/p filgastrim, shock liver with slow to resolved hyperbilirubinemia, and recurrent SVT.    # Acute hypoxemic respiratory failure  # MCTD-ILD  # RIJ DVT   # Ancephalopathy  # Transaminitis/hyperbilirubinemia  # Immunosuppressed  # SVT  # Oropharyngeal dysphagia   # Shock  - c/w HFNC, wean as tolerated  - c/w bronchodilators and chest PT  - Still with intermittent but less in frequency since yesterday episodes of SVT requiring transient increase in vasopressors, responsive to Metoprolol IV pushes. c/w PO metoprolol to 25mg BID. Hold amiodarone 2/2 to LFT elevation. Intermittently appears to have wandering atrial pacemaker, will repeat EKG if noticed again  - Offical S+S 9/25 with recommendation of NPO for now, pending MBS. Will hold off further eval for now until improved strength and phonation. c/w tube feeds via NGT   - c/w Prednisone 1mg/kg for now. Will f/u rheum re taper, will need slow taper, and for steroid sparing agent. Pending repeat dose of Rituxan on 9/30  - Leukocytosis likely 2/2 to Filgrastim, given immunosuppression will monitor closely for infection.  - Trend LFTs, T Bili improving but Alk P, AST, ALT slightly up today. Will recall hepatology if continued to be elevated, Repeat US w/ dopplers notable for distended GB w/ sludge but no thrombus  - c/w AC w/ Lovenox for RIJ thrombus  - Monitor off seroquel. Was able to sleep yesterday night and less lethargy after stopping seroquel  - c/w PT/OT, OOB to chair or bed in chair mode as toelerated  - Incentive spirometer  - 1/2 NS @ 75 cc/hr x 24 hours    Jozef Borden MD  Pulmonary & Critical Care
63 yo F with afib, recently diagnosed with MCTD-ILD (anti RNP) admitted to [facility] with acute hypoxemic respiratory failure c/b acute hypoxemic and hypercapnic respiratory failure requiring intubation and VV- ECMO on 9/13, then transferred to [facility], concerning for DAH vs ILD flare, decannulated from VV-ECMO 9/21, extubated 9/22.    Patient brought to [facility] for ARDs requiring VV-ECMO. Serologies positive for mainly MCTD-ILD, s/p pulse dose steroids, PLEX (9/15, 9/18, 9/20) and dose of Rituxan (9/16). Course c/b severe leukopenia s/p filgastrim, shock liver with slow to resolved hyperbilirubinemia, and recurrent SVT.    # Acute hypoxemic respiratory failure  # MCTD-ILD  # RIJ DVT   # Encephalopathy  # Transaminitis/hyperbilirubinemia  # Immunosuppressed  # SVT  # Oropharyngeal dysphagia   # Shock  # Epistaxis  - c/w supplemental O2, wean as tolerated.  Will get CT chest today to evaluate parenchyma  - c/w bronchodilators and chest PT  - start low dose seroquel qhs for acute metabolic encephalopathy   - c/w PO metoprolol for recurrent SVT, improved. Hold amiodarone 2/2 to LFT elevation. 1  - Official S+S 9/25 with recommendation of NPO for now, pending MBS. Will reassess with cineesophagogram as per speech and swallow when improved phonation and strength. c/w tube feeds via NGT   - c/w Prednisone 1mg/kg for now. Depending on infectious workup, may give rituxan during week  - ongoing leukocytosis, will get ct c/a/p, evaluate R groin as well  - c/w broad abx  - Trend LFTs, fluctuating but overall improved. Will recall hepatology if continued to be elevated, Repeat US w/ dopplers notable for distended GB w/ sludge but no thrombus  - c/w AC w/ Lovenox for RIJ thrombus. Will check AntiXa level tomorrow AM  - c/w aggressive PT/OT, OOB to chair or bed in chair mode as tolerated  - Incentive spirometer  - c/w Afrin and PRN nebulized TXA for epistaxis, still with slow bleeding
Critically ill patient requiring frequent bedside visits with therapy changes.

## 2023-10-01 NOTE — PROGRESS NOTE ADULT - SUBJECTIVE AND OBJECTIVE BOX
Interval Events: No acute events reported overnight    HPI:  62 year old female with a past medical history of afib, and sciatica, who presented to [facility] for shortness of breath. She had gone to [facility] where she had CXR which showed bilateral lower lobe infiltrates so was sent to ER. She had been having exertional dyspnea and hypoxemia (on home pulse ox to 82%). She had been having dyspnea for several months and had a workup in [place] with a CT scan (which was negative for PE). She also states that she was seen by a pulmonologist and rheumatology, where they ruled out lupus and other immunological disorders.    [facility] Hospital Course  Found to be hypoxic and have interstitial lung disease appearance on CT, admitted 8/26 for AHRF, further ILD workup and management. TTE 8/26 with normal LVEF. Pt was started on prednisone on admission with gradually improving O2 requirement and has been stable on 2-3LNC since 9/3. Started steroid taper on 9/6 and fully transitioned to PO 9/8 overnight. Started on Mycophenolate mofetil (cellcept) 9/8 evening but pt reported subjective side effects with weakness. Episode of AF w RVR with HR up to 140s on 9/11 am, decreased to HR 10-110s with IV lopressor 10. CTA negative for PE and showed interval improvements in GGO but possible lingular bleb and RML bronchiectasis. Patient received 2L of but before more fluids and beta-blocker pt developed heart palpitations with EKG HR 170s with SVT. BPs 105/70s. Did not respond to vagal maneuvers. Pt given oral lopressor 12.5 and IV lopressor 5, with improvement of HR to 130s. SBP briefly dropped to 60-70s then recovered. Patient on NRB offered HFNC/BiPAP but refused. Started on empiric vancomycin and zosyn. BCx w/ NGTD. Per pulm increased solumedrol to 40mg qd. Patient acceded to HFNC in which she desatted and presented with increased wob for which she was transitioned to BiPAP. Patient with anxiety while on BiPAP presenting tachypnea and desatting to the 80s despite verbal redirection for which she was administered ativan 1mg IVP x1. Patient distress improved with sats in the low 90s but had desaturation to 70s. STAT CXR showed increased pulmonary congestion for which patient was administered lasix without improvement. Intubated and started on mechanical ventilation but required very high PEEP (18) and 100% FiO2 and still had low saturations. VV ECMO team consulted and accepted to [facility] center. Per signout patient had RV enlargement and dysfunction on POCUS with concern for either new PE vs high pulmonary pressures due to PEEP 18 and lung dysfunction. Patient went for cannulation at [facility] with flouro showing no acute PE. Cannulated with 25 F drainage cannula in R Fem V and 23F “arterial” cannula in RIJ.    (13 Sep 2023 15:24)      SUBJECTIVE: Patient seen and examined at bedside.    REVIEW OF SYSTEMS:  [x] All other systems negative  [ ] Unable to assess ROS because ________    REVIEW OF SYSTEMS:  Constitutional: [ ] negative [ ] fevers [ ] chills [ ] weight loss [ ] weight gain  HEENT: [ ] negative [ ] dry eyes [ ] eye irritation [ ] postnasal drip [ ] nasal congestion  CV: [ ] negative  [ ] chest pain [ ] orthopnea [ ] palpitations [ ] murmur  Resp: [ ] negative [ ] cough [ ] shortness of breath [ ] dyspnea [ ] wheezing [ ] sputum [ ] hemoptysis  GI: [ ] negative [ ] nausea [ ] vomiting [ ] diarrhea [ ] constipation [ ] abd pain [ ] dysphagia   : [ ] negative [ ] dysuria [ ] nocturia [ ] hematuria [ ] increased urinary frequency  Musculoskeletal: [ ] negative [ ] back pain [ ] myalgias [ ] arthralgias [ ] fracture  Skin: [ ] negative [ ] rash [ ] itch  Neurological: [ ] negative [ ] headache [ ] dizziness [ ] syncope [ ] weakness [ ] numbness  Psychiatric: [ ] negative [ ] anxiety [ ] depression  Endocrine: [ ] negative [ ] diabetes [ ] thyroid problem  Hematologic/Lymphatic: [ ] negative [ ] anemia [ ] bleeding problem  Allergic/Immunologic: [ ] negative [ ] itchy eyes [ ] nasal discharge [ ] hives [ ] angioedema  [ ] All other systems negative  [ ] Unable to assess ROS because ________    OBJECTIVE:  ICU Vital Signs Last 24 Hrs  T(C): 37.3 (01 Oct 2023 06:00), Max: 37.3 (01 Oct 2023 06:00)  T(F): 99.1 (01 Oct 2023 06:00), Max: 99.1 (01 Oct 2023 06:00)  HR: 87 (01 Oct 2023 06:00) (66 - 93)  BP: 101/50 (01 Oct 2023 06:00) (98/48 - 125/66)  BP(mean): 61 (01 Oct 2023 06:00) (60 - 79)  ABP: --  ABP(mean): --  RR: 24 (01 Oct 2023 06:00) (17 - 31)  SpO2: 97% (01 Oct 2023 06:00) (95% - 100%)    O2 Parameters below as of 01 Oct 2023 04:00  Patient On (Oxygen Delivery Method): face tent    O2 Concentration (%): 50          09-30 @ 07:01  -  10-01 @ 07:00  --------------------------------------------------------  IN: 2285 mL / OUT: 2650 mL / NET: -365 mL      CAPILLARY BLOOD GLUCOSE      POCT Blood Glucose.: 125 mg/dL (01 Oct 2023 06:16)      Physical Exam:   Constitutional: ill appearing, no acute distress   HEENT: + PERRLA, EOMI, no drainage or redness. scleral icterus b/l  Neck: supple,  No JVD  Respiratory: diminished breath Sounds equal bilaterally to auscultation, no accessory muscle use noted  Cardiovascular: Regular rate, regular rhythm, normal S1, S2; no murmurs or rub  Gastrointestinal: Soft, non-tender, distended, no hepatosplenomegaly, normal bowel sounds  Extremities: + 2 peripheral edema, no cyanosis, no clubbing   Vascular: Equal and normal pulses: 2+ peripheral pulses throughout  Neurological: alert. awake, hypophonic  Psychiatric: calm, normal mood, normal affect  Musculoskeletal: No joint swelling or deformity; no limitation of movement, weakness noted upper and lower ext.  Skin: warm, dry, well perfused, no rashes, right IJ and right fem sutures in place post ecmo, right groin wound w/o noted drainage        HOSPITAL MEDICATIONS:  Standing Meds:  albuterol/ipratropium for Nebulization 3 milliLiter(s) Nebulizer every 6 hours  artificial  tears Solution 1 Drop(s) Both EYES every 12 hours  atovaquone  Suspension 1500 milliGRAM(s) Oral daily  cefTRIAXone   IVPB 1000 milliGRAM(s) IV Intermittent every 24 hours  chlorhexidine 2% Cloths 1 Application(s) Topical <User Schedule>  dextrose 5%. 1000 milliLiter(s) IV Continuous <Continuous>  dextrose 50% Injectable 25 Gram(s) IV Push once  dextrose 50% Injectable 12.5 Gram(s) IV Push once  dextrose 50% Injectable 25 Gram(s) IV Push once  enoxaparin Injectable 100 milliGRAM(s) SubCutaneous <User Schedule>  folic acid 1 milliGRAM(s) Oral daily  glucagon  Injectable 1 milliGRAM(s) IntraMuscular once  insulin lispro (ADMELOG) corrective regimen sliding scale   SubCutaneous every 6 hours  insulin NPH human recombinant 6 Unit(s) SubCutaneous every 6 hours  methylPREDNISolone sodium succinate Injectable 80 milliGRAM(s) IV Push daily  metoprolol tartrate 25 milliGRAM(s) Oral two times a day  midodrine 20 milliGRAM(s) Oral every 8 hours  multivitamin/minerals/iron Oral Solution (CENTRUM) 15 milliLiter(s) Oral daily  pantoprazole  Injectable 40 milliGRAM(s) IV Push daily  QUEtiapine 12.5 milliGRAM(s) Oral at bedtime  senna Syrup 10 milliLiter(s) Oral at bedtime  vancomycin  IVPB 1250 milliGRAM(s) IV Intermittent every 12 hours      PRN Meds:  aluminum hydroxide/magnesium hydroxide/simethicone Suspension 30 milliLiter(s) Oral every 4 hours PRN  dextrose Oral Gel 15 Gram(s) Oral once PRN  sodium chloride 0.65% Nasal 1 Spray(s) Both Nostrils every 8 hours PRN  tranexamic acid Injectable for Nebulization 500 milliGRAM(s) Inhalation every 8 hours PRN      LABS:                        10.1   26.09 )-----------( 343      ( 30 Sep 2023 23:34 )             31.2     Hgb Trend: 10.1<--, 10.5<--, 10.3<--, 10.6<--, 11.6<--  09-30    135  |  96<L>  |  33<H>  ----------------------------<  119<H>  TNP   |  26  |  0.38<L>    Ca    9.5      30 Sep 2023 23:34  Phos  TNP     09-30  Mg     2.50     09-30    TPro  TNP  /  Alb  3.0<L>  /  TBili  9.7<H>  /  DBili  x   /  AST  TNP  /  ALT  TNP  /  AlkPhos  649<H>  09-30    Creatinine Trend: 0.38<--, 0.44<--, 0.45<--, 0.46<--, 0.44<--, 0.43<--    Urinalysis Basic - ( 30 Sep 2023 23:34 )    Color: x / Appearance: x / SG: x / pH: x  Gluc: 119 mg/dL / Ketone: x  / Bili: x / Urobili: x   Blood: x / Protein: x / Nitrite: x   Leuk Esterase: x / RBC: x / WBC x   Sq Epi: x / Non Sq Epi: x / Bacteria: x        Venous Blood Gas:  09-30 @ 23:34  7.49/46/65/35/92.1  VBG Lactate: 1.5  Venous Blood Gas:  09-30 @ 00:58  7.50/39/101/30/98.8  VBG Lactate: 2.1      MICROBIOLOGY:     RADIOLOGY:  [ ] Reviewed and interpreted by me           Interval Events: No acute events reported overnight. Tube feeds held this AM for planned HIDA scan.    HPI:  62 year old female with a past medical history of afib, and sciatica, who presented to [facility] for shortness of breath. She had gone to [facility] where she had CXR which showed bilateral lower lobe infiltrates so was sent to ER. She had been having exertional dyspnea and hypoxemia (on home pulse ox to 82%). She had been having dyspnea for several months and had a workup in [place] with a CT scan (which was negative for PE). She also states that she was seen by a pulmonologist and rheumatology, where they ruled out lupus and other immunological disorders.    [facility] Hospital Course  Found to be hypoxic and have interstitial lung disease appearance on CT, admitted 8/26 for AHRF, further ILD workup and management. TTE 8/26 with normal LVEF. Pt was started on prednisone on admission with gradually improving O2 requirement and has been stable on 2-3LNC since 9/3. Started steroid taper on 9/6 and fully transitioned to PO 9/8 overnight. Started on Mycophenolate mofetil (cellcept) 9/8 evening but pt reported subjective side effects with weakness. Episode of AF w RVR with HR up to 140s on 9/11 am, decreased to HR 10-110s with IV lopressor 10. CTA negative for PE and showed interval improvements in GGO but possible lingular bleb and RML bronchiectasis. Patient received 2L of but before more fluids and beta-blocker pt developed heart palpitations with EKG HR 170s with SVT. BPs 105/70s. Did not respond to vagal maneuvers. Pt given oral lopressor 12.5 and IV lopressor 5, with improvement of HR to 130s. SBP briefly dropped to 60-70s then recovered. Patient on NRB offered HFNC/BiPAP but refused. Started on empiric vancomycin and zosyn. BCx w/ NGTD. Per pulm increased solumedrol to 40mg qd. Patient acceded to HFNC in which she desatted and presented with increased wob for which she was transitioned to BiPAP. Patient with anxiety while on BiPAP presenting tachypnea and desatting to the 80s despite verbal redirection for which she was administered ativan 1mg IVP x1. Patient distress improved with sats in the low 90s but had desaturation to 70s. STAT CXR showed increased pulmonary congestion for which patient was administered lasix without improvement. Intubated and started on mechanical ventilation but required very high PEEP (18) and 100% FiO2 and still had low saturations. VV ECMO team consulted and accepted to [facility] center. Per signout patient had RV enlargement and dysfunction on POCUS with concern for either new PE vs high pulmonary pressures due to PEEP 18 and lung dysfunction. Patient went for cannulation at [facility] with flouro showing no acute PE. Cannulated with 25 F drainage cannula in R Fem V and 23F “arterial” cannula in RIJ.    (13 Sep 2023 15:24)      SUBJECTIVE: Patient seen and examined at bedside.     REVIEW OF SYSTEMS:  [x] All other systems negative  [ ] Unable to assess ROS because ________    REVIEW OF SYSTEMS:  Constitutional: [ ] negative [ ] fevers [ ] chills [ ] weight loss [ ] weight gain  HEENT: [ ] negative [ ] dry eyes [ ] eye irritation [ ] postnasal drip [ ] nasal congestion  CV: [ ] negative  [ ] chest pain [ ] orthopnea [ ] palpitations [ ] murmur  Resp: [ ] negative [ ] cough [ ] shortness of breath [ ] dyspnea [ ] wheezing [ ] sputum [ ] hemoptysis  GI: [ ] negative [ ] nausea [ ] vomiting [ ] diarrhea [ ] constipation [ ] abd pain [ ] dysphagia   : [ ] negative [ ] dysuria [ ] nocturia [ ] hematuria [ ] increased urinary frequency  Musculoskeletal: [ ] negative [ ] back pain [ ] myalgias [ ] arthralgias [ ] fracture  Skin: [ ] negative [ ] rash [ ] itch  Neurological: [ ] negative [ ] headache [ ] dizziness [ ] syncope [ ] weakness [ ] numbness  Psychiatric: [ ] negative [ ] anxiety [ ] depression  Endocrine: [ ] negative [ ] diabetes [ ] thyroid problem  Hematologic/Lymphatic: [ ] negative [ ] anemia [ ] bleeding problem  Allergic/Immunologic: [ ] negative [ ] itchy eyes [ ] nasal discharge [ ] hives [ ] angioedema  [ ] All other systems negative  [ ] Unable to assess ROS because ________    OBJECTIVE:  ICU Vital Signs Last 24 Hrs  T(C): 37.3 (01 Oct 2023 06:00), Max: 37.3 (01 Oct 2023 06:00)  T(F): 99.1 (01 Oct 2023 06:00), Max: 99.1 (01 Oct 2023 06:00)  HR: 87 (01 Oct 2023 06:00) (66 - 93)  BP: 101/50 (01 Oct 2023 06:00) (98/48 - 125/66)  BP(mean): 61 (01 Oct 2023 06:00) (60 - 79)  ABP: --  ABP(mean): --  RR: 24 (01 Oct 2023 06:00) (17 - 31)  SpO2: 97% (01 Oct 2023 06:00) (95% - 100%)    O2 Parameters below as of 01 Oct 2023 04:00  Patient On (Oxygen Delivery Method): face tent    O2 Concentration (%): 50          09-30 @ 07:01  -  10-01 @ 07:00  --------------------------------------------------------  IN: 2285 mL / OUT: 2650 mL / NET: -365 mL      CAPILLARY BLOOD GLUCOSE      POCT Blood Glucose.: 125 mg/dL (01 Oct 2023 06:16)      Physical Exam:   Constitutional: ill appearing, no acute distress   HEENT: + PERRLA, EOMI, no drainage or redness. scleral icterus b/l  Neck: supple,  No JVD  Respiratory: diminished breath Sounds equal bilaterally to auscultation, no accessory muscle use noted  Cardiovascular: Regular rate, regular rhythm, normal S1, S2; no murmurs or rub  Gastrointestinal: Soft, non-tender, distended, no hepatosplenomegaly, normal bowel sounds  Extremities: + 2 peripheral edema, no cyanosis, no clubbing   Vascular: Equal and normal pulses: 2+ peripheral pulses throughout  Neurological: alert. awake, hypophonic  Psychiatric: calm, normal mood, normal affect  Musculoskeletal: No joint swelling or deformity; no limitation of movement, weakness noted upper and lower ext.  Skin: warm, dry, well perfused, no rashes, right IJ and right fem sutures in place post ecmo, right groin wound w/o noted drainage        HOSPITAL MEDICATIONS:  Standing Meds:  albuterol/ipratropium for Nebulization 3 milliLiter(s) Nebulizer every 6 hours  artificial  tears Solution 1 Drop(s) Both EYES every 12 hours  atovaquone  Suspension 1500 milliGRAM(s) Oral daily  cefTRIAXone   IVPB 1000 milliGRAM(s) IV Intermittent every 24 hours  chlorhexidine 2% Cloths 1 Application(s) Topical <User Schedule>  dextrose 5%. 1000 milliLiter(s) IV Continuous <Continuous>  dextrose 50% Injectable 25 Gram(s) IV Push once  dextrose 50% Injectable 12.5 Gram(s) IV Push once  dextrose 50% Injectable 25 Gram(s) IV Push once  enoxaparin Injectable 100 milliGRAM(s) SubCutaneous <User Schedule>  folic acid 1 milliGRAM(s) Oral daily  glucagon  Injectable 1 milliGRAM(s) IntraMuscular once  insulin lispro (ADMELOG) corrective regimen sliding scale   SubCutaneous every 6 hours  insulin NPH human recombinant 6 Unit(s) SubCutaneous every 6 hours  methylPREDNISolone sodium succinate Injectable 80 milliGRAM(s) IV Push daily  metoprolol tartrate 25 milliGRAM(s) Oral two times a day  midodrine 20 milliGRAM(s) Oral every 8 hours  multivitamin/minerals/iron Oral Solution (CENTRUM) 15 milliLiter(s) Oral daily  pantoprazole  Injectable 40 milliGRAM(s) IV Push daily  QUEtiapine 12.5 milliGRAM(s) Oral at bedtime  senna Syrup 10 milliLiter(s) Oral at bedtime  vancomycin  IVPB 1250 milliGRAM(s) IV Intermittent every 12 hours      PRN Meds:  aluminum hydroxide/magnesium hydroxide/simethicone Suspension 30 milliLiter(s) Oral every 4 hours PRN  dextrose Oral Gel 15 Gram(s) Oral once PRN  sodium chloride 0.65% Nasal 1 Spray(s) Both Nostrils every 8 hours PRN  tranexamic acid Injectable for Nebulization 500 milliGRAM(s) Inhalation every 8 hours PRN      LABS:                        10.1   26.09 )-----------( 343      ( 30 Sep 2023 23:34 )             31.2     Hgb Trend: 10.1<--, 10.5<--, 10.3<--, 10.6<--, 11.6<--  09-30    135  |  96<L>  |  33<H>  ----------------------------<  119<H>  TNP   |  26  |  0.38<L>    Ca    9.5      30 Sep 2023 23:34  Phos  TNP     09-30  Mg     2.50     09-30    TPro  TNP  /  Alb  3.0<L>  /  TBili  9.7<H>  /  DBili  x   /  AST  TNP  /  ALT  TNP  /  AlkPhos  649<H>  09-30    Creatinine Trend: 0.38<--, 0.44<--, 0.45<--, 0.46<--, 0.44<--, 0.43<--    Urinalysis Basic - ( 30 Sep 2023 23:34 )    Color: x / Appearance: x / SG: x / pH: x  Gluc: 119 mg/dL / Ketone: x  / Bili: x / Urobili: x   Blood: x / Protein: x / Nitrite: x   Leuk Esterase: x / RBC: x / WBC x   Sq Epi: x / Non Sq Epi: x / Bacteria: x        Venous Blood Gas:  09-30 @ 23:34  7.49/46/65/35/92.1  VBG Lactate: 1.5  Venous Blood Gas:  09-30 @ 00:58  7.50/39/101/30/98.8  VBG Lactate: 2.1      MICROBIOLOGY:     RADIOLOGY:  [ ] Reviewed and interpreted by me

## 2023-10-02 LAB
ALBUMIN SERPL ELPH-MCNC: 2.9 G/DL — LOW (ref 3.3–5)
ALP SERPL-CCNC: 569 U/L — HIGH (ref 40–120)
ALT FLD-CCNC: 153 U/L — HIGH (ref 4–33)
ANION GAP SERPL CALC-SCNC: 8 MMOL/L — SIGNIFICANT CHANGE UP (ref 7–14)
AST SERPL-CCNC: 69 U/L — HIGH (ref 4–32)
BASE EXCESS BLDV CALC-SCNC: 9.8 MMOL/L — HIGH (ref -2–3)
BASOPHILS # BLD AUTO: 0.06 K/UL — SIGNIFICANT CHANGE UP (ref 0–0.2)
BASOPHILS NFR BLD AUTO: 0.3 % — SIGNIFICANT CHANGE UP (ref 0–2)
BILIRUB DIRECT SERPL-MCNC: 7.6 MG/DL — HIGH (ref 0–0.3)
BILIRUB SERPL-MCNC: 8.9 MG/DL — HIGH (ref 0.2–1.2)
BILIRUB SERPL-MCNC: 9 MG/DL — HIGH (ref 0.2–1.2)
BLOOD GAS VENOUS COMPREHENSIVE RESULT: SIGNIFICANT CHANGE UP
BUN SERPL-MCNC: 34 MG/DL — HIGH (ref 7–23)
CALCIUM SERPL-MCNC: 9.4 MG/DL — SIGNIFICANT CHANGE UP (ref 8.4–10.5)
CHLORIDE BLDV-SCNC: 101 MMOL/L — SIGNIFICANT CHANGE UP (ref 96–108)
CHLORIDE SERPL-SCNC: 99 MMOL/L — SIGNIFICANT CHANGE UP (ref 98–107)
CO2 BLDV-SCNC: 37.4 MMOL/L — HIGH (ref 22–26)
CO2 SERPL-SCNC: 31 MMOL/L — SIGNIFICANT CHANGE UP (ref 22–31)
CREAT SERPL-MCNC: 0.43 MG/DL — LOW (ref 0.5–1.3)
CULTURE RESULTS: SIGNIFICANT CHANGE UP
EGFR: 109 ML/MIN/1.73M2 — SIGNIFICANT CHANGE UP
EOSINOPHIL # BLD AUTO: 0 K/UL — SIGNIFICANT CHANGE UP (ref 0–0.5)
EOSINOPHIL NFR BLD AUTO: 0 % — SIGNIFICANT CHANGE UP (ref 0–6)
GAS PNL BLDV: 138 MMOL/L — SIGNIFICANT CHANGE UP (ref 136–145)
GAS PNL BLDV: SIGNIFICANT CHANGE UP
GLUCOSE BLDC GLUCOMTR-MCNC: 113 MG/DL — HIGH (ref 70–99)
GLUCOSE BLDC GLUCOMTR-MCNC: 126 MG/DL — HIGH (ref 70–99)
GLUCOSE BLDC GLUCOMTR-MCNC: 163 MG/DL — HIGH (ref 70–99)
GLUCOSE BLDC GLUCOMTR-MCNC: 193 MG/DL — HIGH (ref 70–99)
GLUCOSE BLDC GLUCOMTR-MCNC: 293 MG/DL — HIGH (ref 70–99)
GLUCOSE BLDV-MCNC: 155 MG/DL — HIGH (ref 70–99)
GLUCOSE SERPL-MCNC: 161 MG/DL — HIGH (ref 70–99)
HCO3 BLDV-SCNC: 36 MMOL/L — HIGH (ref 22–29)
HCT VFR BLD CALC: 29.8 % — LOW (ref 34.5–45)
HCT VFR BLDA CALC: 30 % — LOW (ref 34.5–46.5)
HGB BLD CALC-MCNC: 9.9 G/DL — LOW (ref 11.7–16.1)
HGB BLD-MCNC: 9.5 G/DL — LOW (ref 11.5–15.5)
IANC: 18.36 K/UL — HIGH (ref 1.8–7.4)
IMM GRANULOCYTES NFR BLD AUTO: 4.5 % — HIGH (ref 0–0.9)
ISTAT ACTK (ACTIVATED CLOTTING TIME KAOLIN): 161 SEC — HIGH (ref 74–137)
ISTAT ACTK (ACTIVATED CLOTTING TIME KAOLIN): 293 SEC — HIGH (ref 74–137)
ISTAT ACTK (ACTIVATED CLOTTING TIME KAOLIN): 384 SEC — HIGH (ref 74–137)
LACTATE BLDV-MCNC: 1.1 MMOL/L — SIGNIFICANT CHANGE UP (ref 0.5–2)
LYMPHOCYTES # BLD AUTO: 0.29 K/UL — LOW (ref 1–3.3)
LYMPHOCYTES # BLD AUTO: 1.4 % — LOW (ref 13–44)
MAGNESIUM SERPL-MCNC: 2.2 MG/DL — SIGNIFICANT CHANGE UP (ref 1.6–2.6)
MCHC RBC-ENTMCNC: 31.9 GM/DL — LOW (ref 32–36)
MCHC RBC-ENTMCNC: 32.9 PG — SIGNIFICANT CHANGE UP (ref 27–34)
MCV RBC AUTO: 103.1 FL — HIGH (ref 80–100)
MONOCYTES # BLD AUTO: 0.88 K/UL — SIGNIFICANT CHANGE UP (ref 0–0.9)
MONOCYTES NFR BLD AUTO: 4.3 % — SIGNIFICANT CHANGE UP (ref 2–14)
NEUTROPHILS # BLD AUTO: 18.36 K/UL — HIGH (ref 1.8–7.4)
NEUTROPHILS NFR BLD AUTO: 89.5 % — HIGH (ref 43–77)
NRBC # BLD: 0 /100 WBCS — SIGNIFICANT CHANGE UP (ref 0–0)
NRBC # FLD: 0.05 K/UL — HIGH (ref 0–0)
PCO2 BLDV: 55 MMHG — HIGH (ref 39–52)
PH BLDV: 7.42 — SIGNIFICANT CHANGE UP (ref 7.32–7.43)
PHOSPHATE SERPL-MCNC: 3.3 MG/DL — SIGNIFICANT CHANGE UP (ref 2.5–4.5)
PLATELET # BLD AUTO: 251 K/UL — SIGNIFICANT CHANGE UP (ref 150–400)
PO2 BLDV: 56 MMHG — HIGH (ref 25–45)
POTASSIUM BLDV-SCNC: 3.5 MMOL/L — SIGNIFICANT CHANGE UP (ref 3.5–5.1)
POTASSIUM SERPL-MCNC: 3.6 MMOL/L — SIGNIFICANT CHANGE UP (ref 3.5–5.3)
POTASSIUM SERPL-SCNC: 3.6 MMOL/L — SIGNIFICANT CHANGE UP (ref 3.5–5.3)
PROT SERPL-MCNC: 5 G/DL — LOW (ref 6–8.3)
RBC # BLD: 2.89 M/UL — LOW (ref 3.8–5.2)
RBC # FLD: 25.2 % — HIGH (ref 10.3–14.5)
SAO2 % BLDV: 85.8 % — SIGNIFICANT CHANGE UP (ref 67–88)
SODIUM SERPL-SCNC: 138 MMOL/L — SIGNIFICANT CHANGE UP (ref 135–145)
SPECIMEN SOURCE: SIGNIFICANT CHANGE UP
WBC # BLD: 20.51 K/UL — HIGH (ref 3.8–10.5)
WBC # FLD AUTO: 20.51 K/UL — HIGH (ref 3.8–10.5)

## 2023-10-02 PROCEDURE — 99233 SBSQ HOSP IP/OBS HIGH 50: CPT

## 2023-10-02 PROCEDURE — 99223 1ST HOSP IP/OBS HIGH 75: CPT | Mod: GC

## 2023-10-02 PROCEDURE — 74230 X-RAY XM SWLNG FUNCJ C+: CPT | Mod: 26

## 2023-10-02 RX ORDER — INSULIN GLARGINE 100 [IU]/ML
10 INJECTION, SOLUTION SUBCUTANEOUS AT BEDTIME
Refills: 0 | Status: DISCONTINUED | OUTPATIENT
Start: 2023-10-02 | End: 2023-10-03

## 2023-10-02 RX ORDER — ENOXAPARIN SODIUM 100 MG/ML
90 INJECTION SUBCUTANEOUS
Refills: 0 | Status: DISCONTINUED | OUTPATIENT
Start: 2023-10-02 | End: 2023-10-04

## 2023-10-02 RX ORDER — MIDODRINE HYDROCHLORIDE 2.5 MG/1
5 TABLET ORAL EVERY 8 HOURS
Refills: 0 | Status: DISCONTINUED | OUTPATIENT
Start: 2023-10-02 | End: 2023-10-02

## 2023-10-02 RX ORDER — POTASSIUM CHLORIDE 20 MEQ
40 PACKET (EA) ORAL ONCE
Refills: 0 | Status: COMPLETED | OUTPATIENT
Start: 2023-10-02 | End: 2023-10-02

## 2023-10-02 RX ORDER — ACETAMINOPHEN 500 MG
650 TABLET ORAL ONCE
Refills: 0 | Status: COMPLETED | OUTPATIENT
Start: 2023-10-02 | End: 2023-10-02

## 2023-10-02 RX ORDER — MIDODRINE HYDROCHLORIDE 2.5 MG/1
10 TABLET ORAL EVERY 8 HOURS
Refills: 0 | Status: DISCONTINUED | OUTPATIENT
Start: 2023-10-02 | End: 2023-10-05

## 2023-10-02 RX ORDER — INSULIN LISPRO 100/ML
VIAL (ML) SUBCUTANEOUS
Refills: 0 | Status: DISCONTINUED | OUTPATIENT
Start: 2023-10-02 | End: 2023-10-05

## 2023-10-02 RX ADMIN — Medication 40 MILLIEQUIVALENT(S): at 08:38

## 2023-10-02 RX ADMIN — INSULIN GLARGINE 10 UNIT(S): 100 INJECTION, SOLUTION SUBCUTANEOUS at 21:50

## 2023-10-02 RX ADMIN — CHLORHEXIDINE GLUCONATE 1 APPLICATION(S): 213 SOLUTION TOPICAL at 05:50

## 2023-10-02 RX ADMIN — Medication 80 MILLIGRAM(S): at 05:49

## 2023-10-02 RX ADMIN — Medication 1 MILLIGRAM(S): at 11:26

## 2023-10-02 RX ADMIN — Medication 1 DROP(S): at 05:51

## 2023-10-02 RX ADMIN — ATOVAQUONE 1500 MILLIGRAM(S): 750 SUSPENSION ORAL at 11:25

## 2023-10-02 RX ADMIN — Medication 2: at 17:20

## 2023-10-02 RX ADMIN — Medication 650 MILLIGRAM(S): at 09:30

## 2023-10-02 RX ADMIN — Medication 650 MILLIGRAM(S): at 08:58

## 2023-10-02 RX ADMIN — ENOXAPARIN SODIUM 100 MILLIGRAM(S): 100 INJECTION SUBCUTANEOUS at 09:08

## 2023-10-02 RX ADMIN — Medication 2: at 00:52

## 2023-10-02 RX ADMIN — HUMAN INSULIN 6 UNIT(S): 100 INJECTION, SUSPENSION SUBCUTANEOUS at 05:50

## 2023-10-02 RX ADMIN — Medication 6: at 13:08

## 2023-10-02 RX ADMIN — Medication 25 MILLIGRAM(S): at 17:19

## 2023-10-02 RX ADMIN — MIDODRINE HYDROCHLORIDE 10 MILLIGRAM(S): 2.5 TABLET ORAL at 05:50

## 2023-10-02 RX ADMIN — TRANEXAMIC ACID 500 MILLIGRAM(S): 100 INJECTION, SOLUTION INTRAVENOUS at 10:52

## 2023-10-02 RX ADMIN — Medication 15 MILLILITER(S): at 11:26

## 2023-10-02 RX ADMIN — CEFTRIAXONE 100 MILLIGRAM(S): 500 INJECTION, POWDER, FOR SOLUTION INTRAMUSCULAR; INTRAVENOUS at 09:09

## 2023-10-02 RX ADMIN — MIDODRINE HYDROCHLORIDE 10 MILLIGRAM(S): 2.5 TABLET ORAL at 21:52

## 2023-10-02 RX ADMIN — HUMAN INSULIN 6 UNIT(S): 100 INJECTION, SUSPENSION SUBCUTANEOUS at 00:52

## 2023-10-02 RX ADMIN — Medication 3 MILLILITER(S): at 04:11

## 2023-10-02 RX ADMIN — ENOXAPARIN SODIUM 90 MILLIGRAM(S): 100 INJECTION SUBCUTANEOUS at 21:50

## 2023-10-02 RX ADMIN — SENNA PLUS 10 MILLILITER(S): 8.6 TABLET ORAL at 21:52

## 2023-10-02 RX ADMIN — MIDODRINE HYDROCHLORIDE 10 MILLIGRAM(S): 2.5 TABLET ORAL at 14:00

## 2023-10-02 RX ADMIN — Medication 3 MILLILITER(S): at 10:25

## 2023-10-02 RX ADMIN — PANTOPRAZOLE SODIUM 40 MILLIGRAM(S): 20 TABLET, DELAYED RELEASE ORAL at 11:25

## 2023-10-02 RX ADMIN — Medication 25 MILLIGRAM(S): at 06:39

## 2023-10-02 NOTE — PROGRESS NOTE ADULT - ASSESSMENT
61 yo F w/ Afib initially presented to urgent care for SOB where she had an XR done concerning for b/l lower infiltrates, sent to [facility] ED and admitted to  on 8/26 after being found to be hypoxemic, reported having  debilitating b/l hand numbness/tingling and some muscles weakness several months. She was found to have interstitial lung disease appearance on CT, with the plan for further ILD workup and management, started on prednisone on admission with gradually improving O2 requirements and stable on 2-3LNC. She underwent bronchoscopy with TBBX on 8/30 which showed Non-Specific Intersitial Disease (NSIP)/OP. Labs notable for positive ARIANA 1:1280, RNP > 8, Alejandro > 8. Patient continued on steroids and received cellcept, which was discontinued 2/2 Afib-RVR. Interval CTA chest on 9/11 also negative PE did show possible lingula pneumonia. Started on empiric vancomycin and zosyn 9/12, course was then c/b episode of SVT to 170s w/ rapidly progressive hypoxemic respiratory failure, placed NRB offered HFNC/BiPAP but refused, leading to worsening hypoxemic respiratory failure requiring intubation on 9/13. Despite best practices, patient remained hypoxemic and patient was cannulated for VV-ECMO on 9/13 and transferred to [facility] for further management. 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 Showed significant lung improvement and was decannulated 9/21. Extubated to HFNC 9/22. Currently on FT 50% given recent episode of epistaxis.     NEUROLOGY  Home meds: gabapentin 100g TID; holding for now  AA&Ox3 at baseline   - following commands w/ delirium likely iso prolonged hospitalization, sedation, and ICU setting. Now improved; following commands and understands conversation.  - required precedex for anxiety, stopped 9/22  - seroquel 25mg QHS d/c'd, dose may have caused lethargy and hypotension, can give at lower dose of 12.5mg for delirium/anxiety, currently does not require any meds  - CTH 9/14 no acute findings  - C/w aggressive PT/OT - Oob to chair twice daily, currently with kandi lift,  increase activity as tolerated    ENT  #Epistaxis   Small amount of intermittent bleeding noted in b/l nares and hemoptysis?  - Will continue with afrin and nebulized TXA prn; once able to tolerate food, we will remove the NG tube  - humidified air provided via face tent  - Nasal saline spray 1 spray each nose PRN   - Afrin 1 spray for rebleeding followed by direct pressure on the nose, if still actively bleeding, call ENT for reassessment  - monitor hgb and for signs of overt bleeding closely       PULMONARY  Admitted to [facility] on 8/26 after p/w SOB, found to be hypoxemic, required 2-4L NC/bibap, initially responded well to IV steroids. 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 2/2 bacterial PNA vs atypical PNA vs aspiration vs AEILD continued to show significant improvement from a lung perspective and was decannulated 9/21, and extubated to HFNC 9/22, now tolerating 50% face tent.  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   - 9/20 BAL culture: normal respiratory selvin  - continue duonebs  - s/p decannulation on 9/21 with plan for suture removal ~7-10 days   - extubated 9/22 to HFNC   - weaned from HFNC to 6L nasal cannula, given epistaxis currently on face tent. Will continue with face tent until epistaxis fully resolves.  - CT 9/30 w/reticular peripheral lung opacities, LL atelectasis, and bilateral GGO's, improved compared to prior image (9/14) findings that showed extensive lung consolidations    CARDIOVASCULAR  Hx of Afib w at OSH, started on Amiodarone gtt now in shock state requiring requiring pressors likely septic with component of vaspoplegia i/s/o sedation, possible contribution of cardiogenic from RV dysfunction. Had episodes of SVT (9/25) responsive to lopressor   - No PE seen on invasive pulmonary angiography at time of ECMO cannulation or in recent imaging  - NO2 weaned off  (9/15) RV function remains unchanged  - s/p milrinone, off since (9/13)  - converted to sinus (9/14) discontinued amio gtt iso bradycardia, back to AF with RVR on 9/19 when sedation weaned off  - continue 25mg metoprolol BID for rate control   - Phenylephrine off since (9/25),  - continue midodrine to assist with blood pressure, dose lowered from 20mg to 10mg q8 hours iso bradycardia; will titrate down as tolerated  - RITCHIE 9/14 : No MEREDITH thrombus noted, negative for PFO. LVOT diameter of 2 cm  - TTE 9/12 with EF 65-70% with normal LV and RV  - TTE 9/14 with  EF 18%. Severe global LV systolic dysfunction. RV enlargement with decreased RV systolic function, however RITCHIE and recent POCUS shows normal LV systolic function, improved RV systolic function   - TTE 9/19 with recovered EF to 67% but still with RV enlargement and systolic dysfunction      GASTROINTESTINAL  Transaminase elevation likely 2/2 combination of shock liver, malposition of ECMO cannula and liver dysfunction  - ALK/ AST/ALT downtrending but remain elevated 624/105/177, T.bili peaked 19.3 mostly direct, and now 9.4  - Acute hepatitis panel negative  - RUQ US 9/15 shows fatty infiltration of liver, negative for hepatobiliary pathology, repeat RUQ US performed 9/22 for worsening LFT's and rising bili with findings negative as well  - Hepatology consulted - likely shock liver with expected delay in improvement in bilirubin  - Hypertriglyceridemia 836, hx of fatty liver and propofol gtt, s/p insulin gtt, now improving  - Given previous watery bowel movements, will continue with senna only at this time. Last BM 10/01   - failed dysphagia screen x2 currently with KFT in place tolerating tube feeds, plan for cineesophagogram as phonation improves  - nutrition following- recommend changing TF formula to Peptamen to decrease GI discomfort  - CT abdomen 9/15 w/o bowel obstruction, noted with colonic diverticulosis mostly in sigmoid, w/o evidence of diverticulitis  - CT abdomen 9/30 w/ sigmoid diverticulosis, without evidence of acute diverticulitis, w/distended gallbladder w/sludge   - HIDA scan 10/01 obtained iso recent imaging, leukocytosis and abdominal discomfort, however no evidence found of acute cholecystitis or biliary obstruction       RENAL  sCr baseline 0.78  - Creatinine wnl  - I/O's:  negative -7.4L/LOS and negative -365ml/24 hours  - s/p diuresis with lasix, now given PRN. Will give lasix today, goal to keep net negative up to 1 Liter 24 hours  - good UO with primafit  - hypernatremia improved, free H2O discontinued 9/21      INFECTIOUS DISEASE  Leukocytosis  - Afebrile, however WBC continues to uptrend, likely iso filgrastim (initiated 9/17-9/21 for leukopenia) vs infection?    - blood cultures negative to date, sputum and right groin (s/p ecmo cannula site) wound culture pending  - UA+ with many bacteria, however urine culture negative, will continue CTX 1gm IVPB daily (9/27-  ) for now,  Vanco started empirically (9/29-10/1) now discontinued  - CT imaging CAP 9/30 and HIDA scan 10/01 obtained, findings with no evidence of infection/abscess  - previous cultures, BAL, cytopathology so far negative  - leukopenia now resolved s/p filgrastim (9/17-9/21)  - Bactrim DS discontinued iso leukopenia, continue Mepron for PJP prophylaxis instead  - s/p IV Ceftriaxone 5 days? at [facility] out of an abundance of precaution to cover BAL specimen  - s/p zosyn at OSH (9/12) for lingula PNA  - vancomycin (9/12-9/15 ) d/c'd negative mrsa PCR, and azithro (9/14-9/15) d/c'd neg Urine legionella  - s/p empiric donald (9/13-9/20) now that ANC >1.5  - vanco restarted for ppx on 9/18 d/t leukopenia but stopped on 9/20  - positive COVID-19 antigen in late August, recent RVP negative 9/30  - ID following- f/u recs      ENDOCRINE  # Hyperglycemia i/s/o steroids  Admission A1c 6.1  - s/p insulin gtt, transitioned to NPH 11 w/ISS,  NPH was lowered to 5u units, and now increased to 6u w/mod ISS given glucose >200 iso increase TF rate and hyperglycemia  - continue to adjust insulin and ISS as steroids are tapered and diet changes  - elevated triglycerides 835, has hx of fatty liver, had been on propofol gtt. TG downtrending since initiating insulin gtt for hyperglycemia, now normalizing off propofol  - TSH low initially at 0.13 repeat normal     HEME  #Right IJ thrombus   - s/p argatroban gtt , transitioned to lovenox   - Lowered lovenox dose from 120mg BID to 100mg BID based on elevated Anti Xa level (1.14), level redrawn with 100mg BID dose resulted within normal range: 1.08   - INR elevated likely iso argatroban and liver dysfxn, s/p Vit. K (9/14) and FFP x1 (9/15) - now resolved  - platelets x 7 for thrombocytopenia <50,000, last transfused  9/21 -now improved  - Intermittent episodes of epistaxis, no overt bleeding noted. Hgb remains stable     #Leukopenia  #Thrombocytopenia  - Thrombocytopenia refractory after intermittent transfusions while on circuit  - Heme consult placed for thrombocytopenia, noted to also be leukopenic but now resolved   - unlikely HLH/MAS since many abnormalities can be explained by severe hypoxemia/hypotension during initial decompensation prior to ecmo cannulation but some features that are consistent and with rheumatic disease it is a possibility to f/u hematology regarding utility of further lab/pathologic testing  - peripheral blood smear reviewed: large platelets noted, no platelet clumps, no blast cells noted, neutrophils noted w/ normal number of lobes, nucleated RBC noted  - acute hepatitis panel negative  - s/p filgrastim 480 daily  given ANC >1500 in the setting of possible infxn       #R/O DVT  - LE duplex negative for DVT   - first VA duplex post decannulation 9/21 shows non-occlusive deep vein thrombosis in the right jugular vein and the right cephalic is non-compressible  - continue lovenox      MSK  Onset of debilitating b/l hand cramps and some muscles weakness x several months, unclear etiology, radiculopathy? vs myositis?   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   - symptoms improved with cellcept (9/8-9/11) but discontinued given Afib RVR   - f/u with Dr. Nik Marie or Dante Urbano for EMG upon discharge (osh?)      RHEUM  - started on Solumedrol 60mg q6h from 9/1 to 9/6 then weaned over time to then Medrol 32 mg 9/9 to 9/12 at [facility]  - s/p Cellcept 9/8 to 9/11 but stopped due to Afib- RVR/tachycardia   - CT findings, Improved/decreased extent of bilateral ground glass opacities and reticular markings compared to the previous study. Findings are nonspecific but differential etiologies include interstitial pneumonia, drug toxicity, nonspecific connective tissue disorders.  - OSH labs show strongly positive ARIANA, RNP, and anti smith antibodies with low C4 and normal C3. 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  - s/p rituximab 9/16/23  - plan for second rituximab dose was for ~9/30/23 but will hold off for now given patient currently receiving antibiotics       SKIN  Reportedly had single episode of painful rash on her shins, ears and neck and chest which resolved with a steroid cream from a dermatologist prior to admission  - no evidence of rash currently  - right groin with breakdown s/p cannula placement may be possible source of infection - wound culture not collected, no drainage noted  - as per wound care,  area does not appear infected       PROPHYLAXIS  # DVT: Lovenox  # GI: TF      LINES  -Ecmo Cannulas 9/13-9/21  -LIJ TLC- 9/13-9/21  -Left Ax Shelley - 9/13-9/27 (placed at OSH)  -RA 16g PIV x2- 9/15     63 yo F w/ Afib initially presented to urgent care for SOB where she had an XR done concerning for b/l lower infiltrates, sent to [facility] ED and admitted to  on 8/26 after being found to be hypoxemic, reported having  debilitating b/l hand numbness/tingling and some muscles weakness several months. She was found to have interstitial lung disease appearance on CT, with the plan for further ILD workup and management, started on prednisone on admission with gradually improving O2 requirements and stable on 2-3LNC. She underwent bronchoscopy with TBBX on 8/30 which showed Non-Specific Intersitial Disease (NSIP)/OP. Labs notable for positive ARIANA 1:1280, RNP > 8, Alejandro > 8. Patient continued on steroids and received cellcept, which was discontinued 2/2 Afib-RVR. Interval CTA chest on 9/11 also negative PE did show possible lingula pneumonia. Started on empiric vancomycin and zosyn 9/12, course was then c/b episode of SVT to 170s w/ rapidly progressive hypoxemic respiratory failure, placed NRB offered HFNC/BiPAP but refused, leading to worsening hypoxemic respiratory failure requiring intubation on 9/13. Despite best practices, patient remained hypoxemic and patient was cannulated for VV-ECMO on 9/13 and transferred to [facility] for further management. 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 Showed significant lung improvement and was decannulated 9/21. Extubated to HFNC 9/22. Currently on FT 50% given recent episode of epistaxis.     NEUROLOGY  Home meds: gabapentin 100g TID; holding for now  AA&Ox3 at baseline   - following commands w/ delirium likely iso prolonged hospitalization, sedation, and ICU setting. Now improved; following commands and understands conversation.  - required precedex for anxiety, stopped 9/22  - seroquel 25mg QHS for anxi delirium d/c'd,  may have caused lethargy and hypotension, , currently does not require any meds  - CTH 9/14 no acute findings  - C/w aggressive PT/OT - Oob to chair twice daily, currently with kandi lift,  increase activity as tolerated    ENT  #Epistaxis   Small amount of intermittent bleeding noted in b/l nares and hemoptysis?  - Will continue with afrin and nebulized TXA prn; once able to tolerate food, we will remove the NG tube  - humidified air provided via face tent  - Nasal saline spray 1 spray each nose PRN   - Afrin 1 spray for rebleeding followed by direct pressure on the nose, if still actively bleeding, call ENT for reassessment  - monitor hgb and for signs of overt bleeding closely       PULMONARY  Admitted to [facility] on 8/26 after p/w SOB, found to be hypoxemic, required 2-4L NC/bibap, initially responded well to IV steroids. 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 2/2 bacterial PNA vs atypical PNA vs aspiration vs AEILD continued to show significant improvement from a lung perspective and was decannulated 9/21, and extubated to HFNC 9/22, now tolerating 50% face tent.  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   - 9/20 BAL culture: normal respiratory selvin  - continue duonebs  - s/p decannulation on 9/21 with plan for suture removal ~7-10 days   - extubated 9/22 to HFNC   - weaned from HFNC to 6L nasal cannula, given epistaxis currently on face tent. Will continue with face tent until epistaxis fully resolves.  - CT 9/30 w/reticular peripheral lung opacities, LL atelectasis, and bilateral GGO's, improved compared to prior image (9/14) findings that showed extensive lung consolidations    CARDIOVASCULAR  Hx of Afib w at OSH, started on Amiodarone gtt now in shock state requiring requiring pressors likely septic with component of vaspoplegia i/s/o sedation, possible contribution of cardiogenic from RV dysfunction. Had episodes of SVT (9/25) responsive to lopressor   - No PE seen on invasive pulmonary angiography at time of ECMO cannulation or in recent imaging  - NO2 weaned off  (9/15) RV function remains unchanged  - s/p milrinone, off since (9/13)  - converted to sinus (9/14) discontinued amio gtt iso bradycardia, back to AF with RVR on 9/19 when sedation weaned off  - continue 25mg metoprolol BID for rate control   - Phenylephrine off since (9/25),  - continue midodrine to assist with blood pressure, dose lowered from 20mg to 10mg q8 hours iso bradycardia; will titrate down as tolerated  - RITCHIE 9/14 : No MEREDITH thrombus noted, negative for PFO. LVOT diameter of 2 cm  - TTE 9/12 with EF 65-70% with normal LV and RV  - TTE 9/14 with  EF 18%. Severe global LV systolic dysfunction. RV enlargement with decreased RV systolic function, however RITCHIE and recent POCUS shows normal LV systolic function, improved RV systolic function   - TTE 9/19 with recovered EF to 67% but still with RV enlargement and systolic dysfunction      GASTROINTESTINAL  Transaminase elevation likely 2/2 combination of shock liver, malposition of ECMO cannula and liver dysfunction  - ALK/ AST/ALT downtrending but remain elevated 624/105/177, T.bili peaked 19.3 mostly direct, and now 9.4  - Acute hepatitis panel negative  - RUQ US 9/15 shows fatty infiltration of liver, negative for hepatobiliary pathology, repeat RUQ US performed 9/22 for worsening LFT's and rising bili with findings negative as well  - Hepatology consulted - likely shock liver with expected delay in improvement in bilirubin  - Hypertriglyceridemia 836, hx of fatty liver and propofol gtt, s/p insulin gtt, now improving  - Given previous watery bowel movements, will continue with senna only at this time. Last BM 10/01   - failed dysphagia screen x2 currently with KFT in place tolerating tube feeds, plan for cineesophagogram as phonation improves  - nutrition following- recommend changing TF formula to Peptamen to decrease GI discomfort  - CT abdomen 9/15 w/o bowel obstruction, noted with colonic diverticulosis mostly in sigmoid, w/o evidence of diverticulitis  - CT abdomen 9/30 w/ sigmoid diverticulosis, without evidence of acute diverticulitis, w/distended gallbladder w/sludge   - HIDA scan 10/01 obtained iso recent imaging, leukocytosis and abdominal discomfort, however no evidence found of acute cholecystitis or biliary obstruction       RENAL  sCr baseline 0.78  - Creatinine wnl  - I/O's:  negative -7.4L/LOS and negative -365ml/24 hours  - s/p diuresis with lasix, now given PRN. Will give lasix today, goal to keep net negative up to 1 Liter 24 hours  - good UO with primafit  - hypernatremia improved, free H2O discontinued 9/21      INFECTIOUS DISEASE  Leukocytosis  - Afebrile, however WBC continues to uptrend, likely iso filgrastim (initiated 9/17-9/21 for leukopenia) vs infection?    - blood cultures negative to date, sputum and right groin (s/p ecmo cannula site) wound culture pending  - UA+ with many bacteria, however urine culture negative, will continue CTX 1gm IVPB daily (9/27-  ) for now,  Vanco started empirically (9/29-10/1) now discontinued  - CT imaging CAP 9/30 and HIDA scan 10/01 obtained, findings with no evidence of infection/abscess  - previous cultures, BAL, cytopathology so far negative  - leukopenia now resolved s/p filgrastim (9/17-9/21)  - Bactrim DS discontinued iso leukopenia, continue Mepron for PJP prophylaxis instead  - s/p IV Ceftriaxone 5 days? at [facility] out of an abundance of precaution to cover BAL specimen  - s/p zosyn at OSH (9/12) for lingula PNA  - vancomycin (9/12-9/15 ) d/c'd negative mrsa PCR, and azithro (9/14-9/15) d/c'd neg Urine legionella  - s/p empiric donald (9/13-9/20) now that ANC >1.5  - vanco restarted for ppx on 9/18 d/t leukopenia but stopped on 9/20  - positive COVID-19 antigen in late August, recent RVP negative 9/30  - ID following- f/u recs      ENDOCRINE  # Hyperglycemia i/s/o steroids  Admission A1c 6.1  - s/p insulin gtt, transitioned to NPH 11 w/ISS,  NPH was lowered to 5u units, and now increased to 6u w/mod ISS given glucose >200 iso increase TF rate and hyperglycemia  - continue to adjust insulin and ISS as steroids are tapered and diet changes  - elevated triglycerides 835, has hx of fatty liver, had been on propofol gtt. TG downtrending since initiating insulin gtt for hyperglycemia, now normalizing off propofol  - TSH low initially at 0.13 repeat normal     HEME  #Right IJ thrombus   - s/p argatroban gtt , transitioned to lovenox   - Lowered lovenox dose from 120mg BID to 100mg BID based on elevated Anti Xa level (1.14), level redrawn with 100mg BID dose resulted within normal range: 1.08   - INR elevated likely iso argatroban and liver dysfxn, s/p Vit. K (9/14) and FFP x1 (9/15) - now resolved  - platelets x 7 for thrombocytopenia <50,000, last transfused  9/21 -now improved  - Intermittent episodes of epistaxis, no overt bleeding noted. Hgb remains stable     #Leukopenia  #Thrombocytopenia  - Thrombocytopenia refractory after intermittent transfusions while on circuit  - Heme consult placed for thrombocytopenia, noted to also be leukopenic but now resolved   - unlikely HLH/MAS since many abnormalities can be explained by severe hypoxemia/hypotension during initial decompensation prior to ecmo cannulation but some features that are consistent and with rheumatic disease it is a possibility to f/u hematology regarding utility of further lab/pathologic testing  - peripheral blood smear reviewed: large platelets noted, no platelet clumps, no blast cells noted, neutrophils noted w/ normal number of lobes, nucleated RBC noted  - acute hepatitis panel negative  - s/p filgrastim 480 daily  given ANC >1500 in the setting of possible infxn       #R/O DVT  - LE duplex negative for DVT   - first VA duplex post decannulation 9/21 shows non-occlusive deep vein thrombosis in the right jugular vein and the right cephalic is non-compressible  - continue lovenox      MSK  Onset of debilitating b/l hand cramps and some muscles weakness x several months, unclear etiology, radiculopathy? vs myositis?   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   - symptoms improved with cellcept (9/8-9/11) but discontinued given Afib RVR   - f/u with Dr. Nik Marie or Dante Urbano for EMG upon discharge (osh?)      RHEUM  - started on Solumedrol 60mg q6h from 9/1 to 9/6 then weaned over time to then Medrol 32 mg 9/9 to 9/12 at [facility]  - s/p Cellcept 9/8 to 9/11 but stopped due to Afib- RVR/tachycardia   - CT findings, Improved/decreased extent of bilateral ground glass opacities and reticular markings compared to the previous study. Findings are nonspecific but differential etiologies include interstitial pneumonia, drug toxicity, nonspecific connective tissue disorders.  - OSH labs show strongly positive ARIANA, RNP, and anti smith antibodies with low C4 and normal C3. 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  - s/p rituximab 9/16/23  - plan for second rituximab dose was for ~9/30/23 but will hold off for now given patient currently receiving antibiotics       SKIN  Reportedly had single episode of painful rash on her shins, ears and neck and chest which resolved with a steroid cream from a dermatologist prior to admission  - no evidence of rash currently  - right groin with breakdown s/p cannula placement may be possible source of infection - wound culture not collected, no drainage noted  - as per wound care,  area does not appear infected       PROPHYLAXIS  # DVT: Lovenox  # GI: TF      LINES  -Ecmo Cannulas 9/13-9/21  -LIJ TLC- 9/13-9/21  -Left Ax [place] - 9/13-9/27 (placed at OSH)  -RA 16g PIV x2- 9/15     61 yo F w/ Afib initially presented to urgent care for SOB where she had an XR done concerning for b/l lower infiltrates, sent to [facility] ED and admitted to  on 8/26 after being found to be hypoxemic, reported having  debilitating b/l hand numbness/tingling and some muscles weakness several months. She was found to have interstitial lung disease appearance on CT, with the plan for further ILD workup and management, started on prednisone on admission with gradually improving O2 requirements and stable on 2-3LNC. She underwent bronchoscopy with TBBX on 8/30 which showed Non-Specific Intersitial Disease (NSIP)/OP. Labs notable for positive ARIANA 1:1280, RNP > 8, Alejandro > 8. Patient continued on steroids and received cellcept, which was discontinued 2/2 Afib-RVR. Interval CTA chest on 9/11 also negative PE did show possible lingula pneumonia. Started on empiric vancomycin and zosyn 9/12, course was then c/b episode of SVT to 170s w/ rapidly progressive hypoxemic respiratory failure, placed NRB offered HFNC/BiPAP but refused, leading to worsening hypoxemic respiratory failure requiring intubation on 9/13. Despite best practices, patient remained hypoxemic and patient was cannulated for VV-ECMO on 9/13 and transferred to [facility] for further management. 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 Showed significant lung improvement and was decannulated 9/21. Extubated to HFNC 9/22. Currently on FT 50% given recent episode of epistaxis.     NEUROLOGY  Home meds: gabapentin 100g TID; holding for now  AA&Ox3 at baseline   - following commands w/ delirium likely iso prolonged hospitalization, sedation, and ICU setting. Now improved; following commands and understands conversation.  - required precedex for anxiety, stopped 9/22  - seroquel 25mg QHS for anxi delirium d/c'd,  may have caused lethargy and hypotension, , currently does not require any meds  - CTH 9/14 no acute findings  - C/w aggressive PT/OT - Oob to chair twice daily, currently with kandi lift,  increase activity as tolerated    ENT  #Epistaxis   Small amount of intermittent bleeding noted in b/l nares and hemoptysis?  - Will continue with afrin and nebulized TXA prn; once able to tolerate food, we will remove the NG tube  - humidified air provided via face tent  - Nasal saline spray 1 spray each nose PRN   - Afrin 1 spray for rebleeding followed by direct pressure on the nose, if still actively bleeding, call ENT for reassessment  - monitor hgb and for signs of overt bleeding closely       PULMONARY  Admitted to [facility] on 8/26 after p/w SOB, found to be hypoxemic, required 2-4L NC/bibap, initially responded well to IV steroids. 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 2/2 bacterial PNA vs atypical PNA vs aspiration vs AEILD continued to show significant improvement from a lung perspective and was decannulated 9/21, and extubated to HFNC 9/22, now tolerating 50% face tent.  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   - 9/20 BAL culture: normal respiratory selvin  - continue duonebs  - s/p decannulation on 9/21 with plan for suture removal ~7-10 days   - extubated 9/22 to HFNC   - weaned from HFNC to 6L nasal cannula, given epistaxis currently on face tent. Will continue with face tent until epistaxis fully resolves.  - CT 9/30 w/reticular peripheral lung opacities, LL atelectasis, and bilateral GGO's, improved compared to prior image (9/14) findings that showed extensive lung consolidations    CARDIOVASCULAR  Hx of Afib w at OSH, started on Amiodarone gtt now in shock state requiring requiring pressors likely septic with component of vaspoplegia i/s/o sedation, possible contribution of cardiogenic from RV dysfunction. Had episodes of SVT (9/25) responsive to lopressor   - No PE seen on invasive pulmonary angiography at time of ECMO cannulation or in recent imaging  - NO2 weaned off  (9/15) RV function remains unchanged  - s/p milrinone, off since (9/13)  - converted to sinus (9/14) discontinued amio gtt iso bradycardia, back to AF with RVR on 9/19 when sedation weaned off  - continue 25mg metoprolol BID for rate control   - Phenylephrine off since (9/25),  - continue midodrine to assist with blood pressure, dose lowered from 20mg to 10mg q8 hours iso bradycardia; will titrate down as tolerated  - RITCHIE 9/14 : No MEREDITH thrombus noted, negative for PFO. LVOT diameter of 2 cm  - TTE 9/12 with EF 65-70% with normal LV and RV  - TTE 9/14 with  EF 18%. Severe global LV systolic dysfunction. RV enlargement with decreased RV systolic function, however RITCHIE and recent POCUS shows normal LV systolic function, improved RV systolic function   - TTE 9/19 with recovered EF to 67% but still with RV enlargement and systolic dysfunction  - Repeat TTE pending       GASTROINTESTINAL  Transaminase elevation likely 2/2 combination of shock liver, malposition of ECMO cannula and liver dysfunction  - ALK/ AST/ALT downtrending but remain elevated 624/105/177, T.bili peaked 19.3 mostly direct, and now 9.4  - Acute hepatitis panel negative  - RUQ US 9/15 shows fatty infiltration of liver, negative for hepatobiliary pathology, repeat RUQ US performed 9/22 for worsening LFT's and rising bili with findings negative as well  - Hepatology consulted - likely shock liver with expected delay in improvement in bilirubin  - Hypertriglyceridemia 836, hx of fatty liver and propofol gtt, s/p insulin gtt, now improving  - Given previous watery bowel movements, will continue with senna only at this time. Last BM 10/01   - failed dysphagia screen x2, received continuous TF via KFT, today  cineesophagogram as phonation improves  - nutrition following- recommend changing TF formula to Peptamen to decrease GI discomfort  - CT abdomen 9/15 w/o bowel obstruction, noted with colonic diverticulosis mostly in sigmoid, w/o evidence of diverticulitis  - CT abdomen 9/30 w/ sigmoid diverticulosis, without evidence of acute diverticulitis, w/distended gallbladder w/sludge   - HIDA scan 10/01 obtained iso recent imaging, leukocytosis and abdominal discomfort, however no evidence found of acute cholecystitis or biliary obstruction       RENAL  sCr baseline 0.78  - Creatinine wnl  - I/O's:  negative -8.4L/LOS and negative -1040mL/24 hours  - s/p diuresis with lasix, now given PRN, goal to keep net even to slightly negative  - good UO with primafit  - hypernatremia improved, free H2O discontinued 9/21      INFECTIOUS DISEASE  Leukocytosis  - Afebrile, however WBC continues to uptrend, likely iso filgrastim (initiated 9/17-9/21 for leukopenia) vs infection?    - blood cultures negative to date, sputum and right groin (s/p ecmo cannula site) - send wound culture if drainage present  - UA+ with many bacteria, however urine culture negative, s/p CTX 1gm IVPB daily (9/27-10/1 ) and empiric Vanco (9/29-10/1)   - CT imaging CAP 9/30 and HIDA scan 10/01 obtained iso leukocytosis,  findings with no evidence of infection/abscess  - previous cultures, BAL, cytopathology so far negative  - leukopenia now resolved s/p filgrastim (9/17-9/21)  - vanco restarted for ppx on 9/18 d/t leukopenia but stopped on 9/20  - Bactrim DS discontinued iso leukopenia, continue Mepron for PJP prophylaxis instead  - s/p IV Ceftriaxone 5 days? at [facility] out of an abundance of precaution to cover BAL specimen  - s/p zosyn at OSH (9/12) for lingula PNA  - s/p vancomycin (9/12-9/15 ) and azithro (9/14-9/15)   - s/p empiric donald (9/13-9/20) now that ANC >1.5  - positive COVID-19 antigen in late August, recent RVP negative 9/30  - ID following- f/u recs      ENDOCRINE  # Hyperglycemia i/s/o steroids  Admission A1c 6.1  - s/p insulin gtt, transitioned to NPH 6u q6 with mod ISS while receiving TF, will change to Lantus 10 units QHS with mod ISS for now  - continue to adjust insulin and ISS as steroids are tapered and diet changes  - elevated triglycerides 835, has hx of fatty liver, had been on propofol gtt. TG downtrending since initiating insulin gtt for hyperglycemia, now normalizing off propofol  - TSH low initially at 0.13 repeat normal     HEME  #Right IJ thrombus   - s/p argatroban gtt , transitioned to lovenox   - Lowered lovenox dose [id number] BID to 100mg BID based on elevated Anti Xa level (1.14), level redrawn (1.08) now lowered to 90mg iso epistaxis, next Anti Xa level due 10/4 at 1300  - INR elevated likely iso argatroban and liver dysfxn, s/p Vit. K (9/14) and FFP x1 (9/15) - now resolved  - platelets x 7 for thrombocytopenia <50,000, last transfused  9/21 -now improved  - Intermittent episodes of epistaxis, no overt bleeding noted. Hgb slight downtrend but remains stable, likely anemia critical illness and recent epistaxis    #Leukopenia  #Thrombocytopenia  - Thrombocytopenia refractory after intermittent transfusions while on circuit  - Heme consult placed for thrombocytopenia, noted to also be leukopenic but now resolved   - unlikely HLH/MAS since many abnormalities can be explained by severe hypoxemia/hypotension during initial decompensation prior to ecmo cannulation but some features that are consistent and with rheumatic disease it is a possibility to f/u hematology regarding utility of further lab/pathologic testing  - peripheral blood smear reviewed: large platelets noted, no platelet clumps, no blast cells noted, neutrophils noted w/ normal number of lobes, nucleated RBC noted  - acute hepatitis panel negative  - s/p filgrastim 480 daily  given ANC >1500 in the setting of possible infxn       #R/O DVT  - LE duplex negative for DVT   - first VA duplex post decannulation 9/21 shows non-occlusive deep vein thrombosis in the right jugular vein and the right cephalic is non-compressible  - continue lovenox      MSK  Onset of debilitating b/l hand cramps and some muscles weakness x several months, unclear etiology, radiculopathy? vs myositis?   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   - symptoms improved with cellcept (9/8-9/11) but discontinued given Afib RVR   - f/u with Dr. Nik Marie or Dante Urbano for EMG upon discharge (osh?)      RHEUM  - started on Solumedrol 60mg q6h from 9/1 to 9/6 then weaned over time to then Medrol 32 mg 9/9 to 9/12 at [facility]  - s/p Cellcept 9/8 to 9/11 but stopped due to Afib- RVR/tachycardia   - CT findings, Improved/decreased extent of bilateral ground glass opacities and reticular markings compared to the previous study. Findings are nonspecific but differential etiologies include interstitial pneumonia, drug toxicity, nonspecific connective tissue disorders.  - OSH labs show strongly positive ARIANA, RNP, and anti smith antibodies with low C4 and normal C3. 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  - s/p rituximab 9/16/23  - plan for second rituximab dose was for ~9/30/23 but will hold off for now given patient currently receiving antibiotics       SKIN  Reportedly had single episode of painful rash on her shins, ears and neck and chest which resolved with a steroid cream from a dermatologist prior to admission  - no evidence of rash currently  - right groin with breakdown s/p cannula placement may be possible source of infection - wound culture not collected, no drainage noted  - as per wound care,  area does not appear infected       PROPHYLAXIS  # DVT: Lovenox  # GI: TF      LINES  -Ecmo Cannulas 9/13-9/21  -[facility]J TLC- 9/13-9/21  -Left Ax [place] - 9/13-9/27 (placed at OSH)  -RA 16g PIV x2- 9/15    *

## 2023-10-02 NOTE — PROGRESS NOTE ADULT - SUBJECTIVE AND OBJECTIVE BOX
ALIZA FOLEY  [id number]    INTERVAL HPI/OVERNIGHT EVENTS:    sitting in chair, reports feeling comfortable  no further epistaxis  working with PT, still weak  being downgraded to RCU floor        MEDICATIONS  (STANDING):  albuterol/ipratropium for Nebulization 3 milliLiter(s) Nebulizer every 6 hours  artificial  tears Solution 1 Drop(s) Both EYES every 12 hours  atovaquone  Suspension 1500 milliGRAM(s) Oral daily  cefTRIAXone   IVPB 1000 milliGRAM(s) IV Intermittent every 24 hours  chlorhexidine 2% Cloths 1 Application(s) Topical <User Schedule>  dextrose 5%. 1000 milliLiter(s) (50 mL/Hr) IV Continuous <Continuous>  dextrose 50% Injectable 12.5 Gram(s) IV Push once  dextrose 50% Injectable 25 Gram(s) IV Push once  dextrose 50% Injectable 25 Gram(s) IV Push once  enoxaparin Injectable 90 milliGRAM(s) SubCutaneous <User Schedule>  folic acid 1 milliGRAM(s) Oral daily  glucagon  Injectable 1 milliGRAM(s) IntraMuscular once  insulin glargine Injectable (LANTUS) 10 Unit(s) SubCutaneous at bedtime  insulin lispro (ADMELOG) corrective regimen sliding scale   SubCutaneous every 6 hours  methylPREDNISolone sodium succinate Injectable 80 milliGRAM(s) IV Push daily  metoprolol tartrate 25 milliGRAM(s) Oral two times a day  midodrine 10 milliGRAM(s) Oral every 8 hours  multivitamin/minerals/iron Oral Solution (CENTRUM) 15 milliLiter(s) Oral daily  pantoprazole  Injectable 40 milliGRAM(s) IV Push daily  senna Syrup 10 milliLiter(s) Oral at bedtime    MEDICATIONS  (PRN):  aluminum hydroxide/magnesium hydroxide/simethicone Suspension 30 milliLiter(s) Oral every 4 hours PRN Dyspepsia  dextrose Oral Gel 15 Gram(s) Oral once PRN Blood Glucose LESS THAN 70 milliGRAM(s)/deciliter  sodium chloride 0.65% Nasal 1 Spray(s) Both Nostrils every 8 hours PRN Nasal Congestion  tranexamic acid Injectable for Nebulization 500 milliGRAM(s) Inhalation every 8 hours PRN Nose bleed      Allergies    No Known Allergies    Intolerances        Review of Systems:   General: No fevers/chills,  HEENT: No blurry vision  CVS: No CP  Resp: No SOB  MSK: no joint swelling         Vital Signs Last 24 Hrs  T(C): 37.3 (02 Oct 2023 16:00), Max: 37.3 (02 Oct 2023 16:00)  T(F): 99.2 (02 Oct 2023 16:00), Max: 99.2 (02 Oct 2023 16:00)  HR: 82 (02 Oct 2023 17:00) (56 - 86)  BP: 115/67 (02 Oct 2023 17:00) (93/66 - 132/88)  BP(mean): 77 (02 Oct 2023 17:00) (60 - 97)  RR: 34 (02 Oct 2023 17:00) (15 - 46)  SpO2: 100% (02 Oct 2023 17:00) (96% - 100%)    Parameters below as of 02 Oct 2023 14:00  Patient On (Oxygen Delivery Method): nasal cannula w/ humidification  O2 Flow (L/min): 4      Physical Exam:  General: NAD  HEENT: EOMI, MMM  MSK: no synovitis   Neuro: AAOx3  muscle strength 1/5 RUE LUE; 2/5RLE LLE     LABS:                        9.5    20.51 )-----------( 251      ( 02 Oct 2023 00:40 )             29.8     10-02    138  |  99  |  34<H>  ----------------------------<  161<H>  3.6   |  31  |  0.43<L>    Ca    9.4      02 Oct 2023 00:40  Phos  3.3     10-02  Mg     2.20     10-02    TPro  x   /  Alb  x   /  TBili  8.9<H>  /  DBili  7.6<H>  /  AST  x   /  ALT  x   /  AlkPhos  x   10-02      Urinalysis Basic - ( 02 Oct 2023 00:40 )    Color: x / Appearance: x / SG: x / pH: x  Gluc: 161 mg/dL / Ketone: x  / Bili: x / Urobili: x   Blood: x / Protein: x / Nitrite: x   Leuk Esterase: x / RBC: x / WBC x   Sq Epi: x / Non Sq Epi: x / Bacteria: x          RADIOLOGY & ADDITIONAL TESTS:

## 2023-10-02 NOTE — PROGRESS NOTE ADULT - ATTENDING SUPERVISION STATEMENT
Fellow
Resident
Fellow
Resident
Fellow

## 2023-10-02 NOTE — SWALLOW VFSS/MBS ASSESSMENT ADULT - ORAL PHASE
Incomplete tongue to palate contact/Uncontrolled bolus / spillover in hypopharynx slow within functional limits

## 2023-10-02 NOTE — PROGRESS NOTE ADULT - SUBJECTIVE AND OBJECTIVE BOX
Interval Events: Midodrine held overnight for lowered heart rate 50's. Systolic blood pressure remained 90's throughout the night.     HPI:  62 year old female with a past medical history of afib, and sciatica, who presented to [facility] for shortness of breath. She had gone to [facility] where she had CXR which showed bilateral lower lobe infiltrates so was sent to ER. She had been having exertional dyspnea and hypoxemia (on home pulse ox to 82%). She had been having dyspnea for several months and had a workup in [place] with a CT scan (which was negative for PE). She also states that she was seen by a pulmonologist and rheumatology, where they ruled out lupus and other immunological disorders.    [facility] Hospital Course  Found to be hypoxic and have interstitial lung disease appearance on CT, admitted 8/26 for AHRF, further ILD workup and management. TTE 8/26 with normal LVEF. Pt was started on prednisone on admission with gradually improving O2 requirement and has been stable on 2-3LNC since 9/3. Started steroid taper on 9/6 and fully transitioned to PO 9/8 overnight. Started on Mycophenolate mofetil (cellcept) 9/8 evening but pt reported subjective side effects with weakness. Episode of AF w RVR with HR up to 140s on 9/11 am, decreased to HR 10-110s with IV lopressor 10. CTA negative for PE and showed interval improvements in GGO but possible lingular bleb and RML bronchiectasis. Patient received 2L of but before more fluids and beta-blocker pt developed heart palpitations with EKG HR 170s with SVT. BPs 105/70s. Did not respond to vagal maneuvers. Pt given oral lopressor 12.5 and IV lopressor 5, with improvement of HR to 130s. SBP briefly dropped to 60-70s then recovered. Patient on NRB offered HFNC/BiPAP but refused. Started on empiric vancomycin and zosyn. BCx w/ NGTD. Per pulm increased solumedrol to 40mg qd. Patient acceded to HFNC in which she desatted and presented with increased wob for which she was transitioned to BiPAP. Patient with anxiety while on BiPAP presenting tachypnea and desatting to the 80s despite verbal redirection for which she was administered ativan 1mg IVP x1. Patient distress improved with sats in the low 90s but had desaturation to 70s. STAT CXR showed increased pulmonary congestion for which patient was administered lasix without improvement. Intubated and started on mechanical ventilation but required very high PEEP (18) and 100% FiO2 and still had low saturations. VV ECMO team consulted and accepted to [facility]. Per signout patient had RV enlargement and dysfunction on POCUS with concern for either new PE vs high pulmonary pressures due to PEEP 18 and lung dysfunction. Patient went for cannulation at [facility] with flouro showing no acute PE. Cannulated with 25 F drainage cannula in R Fem V and 23F “arterial” cannula in RIJ.    (13 Sep 2023 15:24)      SUBJECTIVE: Patient seen and examined at bedside.     REVIEW OF SYSTEMS:  [x] All other systems negative  [ ] Unable to assess ROS because ________      OBJECTIVE:  ICU Vital Signs Last 24 Hrs  T(C): 36.7 (02 Oct 2023 04:00), Max: 37 (01 Oct 2023 12:00)  T(F): 98 (02 Oct 2023 04:00), Max: 98.6 (01 Oct 2023 12:00)  HR: 65 (02 Oct 2023 05:00) (56 - 85)  BP: 96/51 (02 Oct 2023 05:00) (94/50 - 132/88)  BP(mean): 61 (02 Oct 2023 05:00) (60 - 97)  ABP: --  ABP(mean): --  RR: 17 (02 Oct 2023 05:00) (15 - 35)  SpO2: 100% (02 Oct 2023 05:00) (90% - 100%)    O2 Parameters below as of 02 Oct 2023 05:00  Patient On (Oxygen Delivery Method): ventilator    O2 Concentration (%): 50          09-30 @ 07:01  -  10-01 @ 07:00  --------------------------------------------------------  IN: 2285 mL / OUT: 2650 mL / NET: -365 mL    10-01 @ 07:01  -  10-02 @ 06:01  --------------------------------------------------------  IN: 460 mL / OUT: 1300 mL / NET: -840 mL      CAPILLARY BLOOD GLUCOSE      POCT Blood Glucose.: 126 mg/dL (02 Oct 2023 05:41)      Physical Exam:   Constitutional: ill appearing, no acute distress   HEENT: + PERRLA, EOMI, no drainage or redness. scleral icterus b/l  Neck: supple,  No JVD  Respiratory: diminished breath Sounds equal bilaterally to auscultation, no accessory muscle use noted  Cardiovascular: Regular rate, regular rhythm, normal S1, S2; no murmurs or rub  Gastrointestinal: Soft, non-tender, distended, no hepatosplenomegaly, normal bowel sounds  Extremities: + 2 peripheral edema, no cyanosis, no clubbing   Vascular: Equal and normal pulses: 2+ peripheral pulses throughout  Neurological: alert. awake, hypophonic  Psychiatric: calm, normal mood, normal affect  Musculoskeletal: No joint swelling or deformity; no limitation of movement, weakness noted upper and lower ext.  Skin: warm, dry, well perfused, no rashes, right IJ and right fem sutures in place post ecmo, right groin wound w/o noted drainage      HOSPITAL MEDICATIONS:  Standing Meds:  albuterol/ipratropium for Nebulization 3 milliLiter(s) Nebulizer every 6 hours  artificial  tears Solution 1 Drop(s) Both EYES every 12 hours  atovaquone  Suspension 1500 milliGRAM(s) Oral daily  cefTRIAXone   IVPB 1000 milliGRAM(s) IV Intermittent every 24 hours  chlorhexidine 2% Cloths 1 Application(s) Topical <User Schedule>  dextrose 5%. 1000 milliLiter(s) IV Continuous <Continuous>  dextrose 50% Injectable 25 Gram(s) IV Push once  dextrose 50% Injectable 12.5 Gram(s) IV Push once  dextrose 50% Injectable 25 Gram(s) IV Push once  enoxaparin Injectable 100 milliGRAM(s) SubCutaneous <User Schedule>  folic acid 1 milliGRAM(s) Oral daily  glucagon  Injectable 1 milliGRAM(s) IntraMuscular once  insulin lispro (ADMELOG) corrective regimen sliding scale   SubCutaneous every 6 hours  insulin NPH human recombinant 6 Unit(s) SubCutaneous every 6 hours  methylPREDNISolone sodium succinate Injectable 80 milliGRAM(s) IV Push daily  metoprolol tartrate 25 milliGRAM(s) Oral two times a day  midodrine 10 milliGRAM(s) Oral every 8 hours  multivitamin/minerals/iron Oral Solution (CENTRUM) 15 milliLiter(s) Oral daily  pantoprazole  Injectable 40 milliGRAM(s) IV Push daily  senna Syrup 10 milliLiter(s) Oral at bedtime      PRN Meds:  aluminum hydroxide/magnesium hydroxide/simethicone Suspension 30 milliLiter(s) Oral every 4 hours PRN  dextrose Oral Gel 15 Gram(s) Oral once PRN  sodium chloride 0.65% Nasal 1 Spray(s) Both Nostrils every 8 hours PRN  tranexamic acid Injectable for Nebulization 500 milliGRAM(s) Inhalation every 8 hours PRN      LABS:                        9.5    20.51 )-----------( 251      ( 02 Oct 2023 00:40 )             29.8     Hgb Trend: 9.5<--, 10.1<--, 10.5<--, 10.3<--, 10.6<--  10-02    138  |  99  |  34<H>  ----------------------------<  161<H>  3.6   |  31  |  0.43<L>    Ca    9.4      02 Oct 2023 00:40  Phos  3.3     10-02  Mg     2.20     10-02    TPro  x   /  Alb  x   /  TBili  8.9<H>  /  DBili  7.6<H>  /  AST  x   /  ALT  x   /  AlkPhos  x   10-02    Creatinine Trend: 0.43<--, 0.43<--, 0.44<--, 0.38<--, 0.44<--, 0.45<--    Urinalysis Basic - ( 02 Oct 2023 00:40 )    Color: x / Appearance: x / SG: x / pH: x  Gluc: 161 mg/dL / Ketone: x  / Bili: x / Urobili: x   Blood: x / Protein: x / Nitrite: x   Leuk Esterase: x / RBC: x / WBC x   Sq Epi: x / Non Sq Epi: x / Bacteria: x        Venous Blood Gas:  10-02 @ 00:40  7.42/55/56/36/85.8  VBG Lactate: 1.1  Venous Blood Gas:  09-30 @ 23:34  7.49/46/65/35/92.1  VBG Lactate: 1.5      MICROBIOLOGY:     RADIOLOGY:  [ ] Reviewed and interpreted by me

## 2023-10-02 NOTE — CHART NOTE - NSCHARTNOTEFT_GEN_A_CORE
61 yo F w/ Afib initially presented to urgent care for SOB where she had an XR done concerning for b/l lower infiltrates, sent to [facility] ED and  admitted to [facility] on 8/26 after being found to be hypoxemic, reported having  debilitating b/l hand numbness/tingling and some muscles weakness several months. Found to be hypoxic and have interstitial lung disease appearance on CT, admitted 8/26 for AHRF, further ILD workup and management,  started on prednisone on admission with gradually improving O2 requirement and has been stable on 2-3LNC since 9/3, underwent bronchoscopy with TBBX on 8/30 which showed Non-Specific Intersitial Disease (NSIP)/OP. Labs notable for positive ARIANA 1:1280, RNP > 8, Alejandro > 8. Patient continued on steroids and received cellcept, which was discontinued 2/2 Afib-RVR. Interval CTA chest on 9/11 also negative PE did show possible lingula pneumonia.  Started on empiric vancomycin and zosyn 9/12, course was then c/b episode of SVT to 170s w/ rapidly progressive hypoxemic respiratory failure, placed NRB offered HFNC/BiPAP but refused, leading to worsening hypoxemic respiratory failure requiring intubation  9/13. Despite best practices, patient remained hypoxemic and patient was cannulated for VV-ECMO on 9/13 and transferred to [facility]. Heme consulted for leukopenia which has now resolved.      # Leukocytosis  - Patient has MCTD and is on steriod, which could be the  of current leukocytosis.  - No concern for heme d/o    # IJ thrombus  - patient previously on lovenox 120  mg bid-> had nasal bleeding and had slightly supratherapeutic antiXa level   - Now decreased to lovenox 100 mg bid and anti Xa within therapeutic range  - Hematology to sign off  - Please call back with any questions.

## 2023-10-02 NOTE — SWALLOW VFSS/MBS ASSESSMENT ADULT - COMMENTS
MICU 10/2 "62 year old female with a past medical history of afib, and sciatica, who presented to [facility] for shortness of breath. She had gone to [facility] where she had CXR which showed bilateral lower lobe infiltrates so was sent to ER. She had been having exertional dyspnea and hypoxemia (on home pulse ox to 82%). She had been having dyspnea for several months and had a workup in [place] with a CT scan (which was negative for PE). She also states that she was seen by a pulmonologist and rheumatology, where they ruled out lupus and other immunological disorders."    Patient received in Radiology suite this afternoon for an initial Cinesophagram with RN and ECMO team present. Patient receiving supplemental O2 via face tent. Patient also with NG tube in place. Patient is able to follow directives and verbalizes wants/needs. MICU 10/2 "62 year old female with a past medical history of afib, and sciatica, who presented to [facility] for shortness of breath. She had gone to [facility] where she had CXR which showed bilateral lower lobe infiltrates so was sent to ER. She had been having exertional dyspnea and hypoxemia (on home pulse ox to 82%). She had been having dyspnea for several months and had a workup in [place] with a CT scan (which was negative for PE). She also states that she was seen by a pulmonologist and rheumatology, where they ruled out lupus and other immunological disorders."    Of Note: Patient was seen for clinical swallow assessments on 9/23, 9/25 and 9/28 (please see notes for details). Patient was scheduled for Cinesophagrams on 9/26 and 9/29, however, patient was not medically optimized at those times so exams were deferred (please see notes for details).    Patient received in Radiology suite this afternoon for an initial Cinesophagram with RN and ECMO team present. Patient receiving supplemental O2 via face tent. Patient also with NG tube in place. Patient is able to follow directives and verbalizes wants/needs.

## 2023-10-02 NOTE — SWALLOW VFSS/MBS ASSESSMENT ADULT - DIAGNOSTIC IMPRESSIONS
1. Mild oral dysphagia for puree, soft and bite sized solids, moderately thick liquids, mildly thick liquids and thin liquids marked by adequate oral acceptance, slow chewing for soft and bite sized solids, and slow tongue motion for anterior to posterior transport. There is premature spillage to the hypopharynx due to reduced tongue to palate seal for mildly thick/thin liquids. There is adequate oral clearance post primary swallow.  2. Mild pharyngeal dysphagia for puree, soft and bite sized solids, moderately thick liquids, mildly thick liquids and thin liquids marked by a delayed pharyngeal swallow trigger (bolus head at the vallecula for thin liquids) with reduced base of tongue retraction, reduced hyolaryngeal elevation, reduced epiglottic deflection, adequate pharyngeal contractility and incomplete closure of the laryngeal vestibule. There is mild pharyngeal clearance deficits post primary swallow located in the vallecula for puree that clears with a spontaneous re-swallow. No Aspiration before, during or after the swallow for puree, soft and bite sized solids, moderately thick liquids, mildly thick liquids and thin liquids.

## 2023-10-02 NOTE — PROGRESS NOTE ADULT - ASSESSMENT
# MCTD, ILD   #Acute hypoxic respiratory failure, DAH   # Acute severe hepatitis   # Epistaxis  # UTI, started on ABX 9/27       - Serology: ARIANA 1:1280 Speckled, dsDNA <12. + SM, + Anti-RNP, U1-snRNP RNP A 162, U1-snRNP RNP-70kd 111  - Skin rash on chest with periungual ulcers, ILD, leukopenia, thrombocytopenia.   - CT chest (8/28): Since August 26, 2023, again interstitial lung disease non-UIP pattern. Although no subpleural sparing, possibly interstitialpneumonia, differential includes NSIP associated with connective tissue disorders, chronic HP or drug toxicity.  - Bronchoscopy results (08/30): Bronchial tissue and interstitium showing organizing pneumonia and focal fibrin  - CT chest (9/11): Improved/decreased extent of bilateral groundglass opacities and reticular markings compared to the previous study. Findings are nonspecific but differential etiologies include interstitial pneumonia, drug toxicity, nonspecific connective tissue disorders. New small focus of parenchymal consolidation seen in the lingula;   possible small focal pneumonia.  - Repeat Bronchoscopy (9/13): BAL performed of lingula. Serial BAL x3 performed of RML with progressively bloody return.  - Lab showed thrombocytopenia since she developed respiratory failure ( normal on the first admission to [facility])   - LFT improving   - S/p Rituximab 1 gr on 9/16/23  - s/p plasmapheresis (First session (9/15), (9/18), (9/20)  - 9/20/2023: lungs are improving clinically and radiographically. Repeat bronch without signs of DAH  - 9/22: Pt extubated and d/c ECMO  - Off HFNC   -9/27: UTI started on ABX  epistaxis, s.p ENT eval- conservative management  heme fup- checking factor Xa - supra therapeutic  -Abx stopped       Recommendations:    - next Rituximab dose was due around 9/30  postponed because she was started on ABx ( UTI, worsening leukocytosis),now discussed with primary team they are planning to stop ABx at this time, non concern for active infection  discussed with patient and partner at bedside, agreeable to proceed with next dose of RTX  consent in chart, orders given to chemo RN to schedule for tomorrow   - c/w solumedrol 80 mg daily  as of 10/4, change to solumedrol 70 mg daily   -continue with PCP ppx   - will follow     DW primary team

## 2023-10-02 NOTE — SWALLOW VFSS/MBS ASSESSMENT ADULT - ADDITIONAL RECOMMENDATIONS
1. This service to follow up as schedule permits for possible diet advancement. 2. reconsult if change in medical status.

## 2023-10-02 NOTE — CHART NOTE - NSCHARTNOTEFT_GEN_A_CORE
MICU Transfer Note    Transfer from: MICU  Transfer to:  (  ) Medicine    (  ) Telemetry    (x) RCU    (  ) Palliative    (  ) Stroke Unit    (  ) _______________  Accepting physician: MD Addy Powers     PMHx  62 year old female with a past medical history of afib, and sciatica, who presented to [facility] for shortness of breath. She had gone to [facility] where she had CXR showing bilateral lower lobe infiltrates so was sent to ER. She had been having exertional dyspnea and hypoxemia (pulse ox at home 82%). She had been having dyspnea for several months and had a workup in [place] with a CT scan (which was negative for PE). She also states that she was seen by a pulmonologist and rheumatology, where they ruled out lupus and other immunological disorders.        [facility] Hospital Course  Found to be hypoxic and have interstitial lung disease appearance on CT, admitted 8/26 for AHRF, further ILD workup and management. TTE 8/26 with normal LVEF. Pt was started on prednisone on admission with gradually improving O2 requirement and has been stable on 2-3LNC since 9/3. Started steroid taper on 9/6 and fully transitioned to PO 9/8 overnight. Started on Mycophenolate mofetil (cellcept) 9/8 evening but pt reported subjective side effects with weakness. Episode of AF w RVR with HR up to 140s on 9/11 am, decreased to HR 10-110s with IV lopressor 10. CTA negative for PE and showed interval improvements in GGO but possible lingular bleb and RML bronchiectasis. Patient received 2L of but before more fluids and beta-blocker pt developed heart palpitations with EKG HR 170s with SVT. BPs 105/70s. Did not respond to vagal maneuvers. Pt given oral lopressor 12.5 and IV lopressor 5, with improvement of HR to 130s. SBP briefly dropped to 60-70s then recovered. Patient on NRB offered HFNC/BiPAP but refused. Started on empiric vancomycin and zosyn. BCx w/ NGTD. Per pulm increased solumedrol to 40mg qd. Patient acceded to HFNC in which she desatted and presented with increased wob for which she was transitioned to BiPAP. Patient with anxiety while on BiPAP presenting tachypnea and desatting to the 80s despite verbal redirection for which she was administered ativan IVP. Patient distress improved with sats in the low 90s but had desaturation to 70s. STAT CXR showed increased pulmonary congestion for which patient was administered lasix without improvement. Intubated and started on mechanical ventilation but required very high PEEP (18) and 100% FiO2 and still had low saturations.      VV ECMO team consulted and accepted to [facility] center. Per signout patient had RV enlargement and dysfunction on POCUS with concern for either new PE vs high pulmonary pressures due to PEEP 18 and lung dysfunction. Despite best practices, patient remained hypoxemic and patient was cannulated for VV-ECMO on 9/13 at [facility] with flouro showing no acute PE. Cannulated with 25 F drainage cannula in R Fem V and 23F “arterial” cannula in RIJ.       MICU COURSE:  Transferred to [facility] for further management. 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 Showed significant lung improvement and was decannulated 9/21. Extubated to [facility] 9/22 and then FT 50% d/t episodes of epistaxis         ASSESSMENT & PLAN:         For Follow-Up:                                                            138    |  99     |  34                  Calcium: 9.4   / iCa: x      (10-02 @ 00:40)    ----------------------------<  161       Magnesium: 2.20                             3.6     |  31     |  0.43             Phosphorous: 3.3      TPro  x      /  Alb  x      /  TBili  8.9    /  DBili  7.6    /  AST  x      /  ALT  x      /  AlkPhos  x      02 Oct 2023 01:20 MICU Transfer Note    Transfer from: MICU  Transfer to:  (  ) Medicine    (  ) Telemetry    (x) RCU    (  ) Palliative    (  ) Stroke Unit    (  ) _______________  Accepting physician: MD Addy Powers     PMHx  62 year old female with a past medical history of afib, and sciatica, who presented to [facility] for shortness of breath. She had gone to [facility] where she had CXR showing bilateral lower lobe infiltrates so was sent to ER. She had been having exertional dyspnea and hypoxemia (pulse ox at home 82%). She had been having dyspnea for several months and had a workup in [place] with a CT scan (which was negative for PE). She also states that she was seen by a pulmonologist and rheumatology, where they ruled out lupus and other immunological disorders.        [facility] Hospital Course  Found to be hypoxic and have interstitial lung disease appearance on CT, admitted 8/26 for AHRF, further ILD workup and management. TTE 8/26 with normal LVEF. Pt was started on prednisone on admission with gradually improving O2 requirement and has been stable on 2-3LNC since 9/3. Started steroid taper on 9/6 and fully transitioned to PO 9/8 overnight. Started on Mycophenolate mofetil (cellcept) 9/8 evening but pt reported subjective side effects with weakness. Episode of AF w RVR with HR up to 140s on 9/11 am, decreased to HR 10-110s with IV lopressor 10. CTA negative for PE and showed interval improvements in GGO but possible lingular bleb and RML bronchiectasis. Patient received 2L of but before more fluids and beta-blocker pt developed heart palpitations with EKG HR 170s with SVT. BPs 105/70s. Did not respond to vagal maneuvers. Pt given oral lopressor 12.5 and IV lopressor 5, with improvement of HR to 130s. SBP briefly dropped to 60-70s then recovered. Patient on NRB offered HFNC/BiPAP but refused. Started on empiric vancomycin and zosyn. BCx w/ NGTD. Per pulm increased solumedrol to 40mg qd. Patient acceded to HFNC in which she desatted and presented with increased wob for which she was transitioned to BiPAP. Patient with anxiety while on BiPAP presenting tachypnea and desatting to the 80s despite verbal redirection for which she was administered ativan IVP. Patient distress improved with sats in the low 90s but had desaturation to 70s. STAT CXR showed increased pulmonary congestion for which patient was administered lasix without improvement. Intubated and started on mechanical ventilation but required very high PEEP (18) and 100% FiO2 and still had low saturations.      VV ECMO team consulted and accepted to [facility] center. Per signout patient had RV enlargement and dysfunction on POCUS with concern for either new PE vs high pulmonary pressures due to PEEP 18 and lung dysfunction. Despite best practices, patient remained hypoxemic and patient was cannulated for VV-ECMO on 9/13 at [facility] with flouro showing no acute PE. Cannulated with 25 F drainage cannula in R Fem V and 23F “arterial” cannula in RIJ.       MICU COURSE:  Transferred to [facility] for further management. 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 Showed significant lung improvement and was decannulated 9/21. Extubated to HFNC 9/22 and then FT 50% d/t episodes of epistaxis         ASSESSMENT & PLAN:     61 yo F w/ Afib initially presented to urgent care for SOB where she had an XR done concerning for b/l lower infiltrates, sent to [facility] ED and admitted to  on 8/26 after being found to be hypoxemic, reported having  debilitating b/l hand numbness/tingling and some muscles weakness several months. She was found to have interstitial lung disease appearance on CT, with the plan for further ILD workup and management, started on prednisone on admission with gradually improving O2 requirements and stable on 2-3LNC. She underwent bronchoscopy with TBBX on 8/30 which showed Non-Specific Intersitial Disease (NSIP)/OP. Labs notable for positive ARIANA 1:1280, RNP > 8, Alejandro > 8. Patient continued on steroids and received cellcept, which was discontinued 2/2 Afib-RVR. Interval CTA chest on 9/11 also negative PE did show possible lingula pneumonia. Started on empiric vancomycin and zosyn 9/12, course was then c/b episode of SVT to 170s w/ rapidly progressive hypoxemic respiratory failure, placed NRB offered HFNC/BiPAP but refused, leading to worsening hypoxemic respiratory failure requiring intubation on 9/13. Despite best practices, patient remained hypoxemic and patient was cannulated for VV-ECMO on 9/13 and transferred to [facility] for further management. 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 Showed significant lung improvement and was decannulated 9/21. Extubated to [facility] 9/22. Currently on FT 50% given recent episode of epistaxis.     NEUROLOGY  Home meds: gabapentin 100g TID; holding for now  AA&Ox3 at baseline   - following commands w/ delirium likely iso prolonged hospitalization, sedation, and ICU setting. Now improved; following commands and understands conversation.  - required precedex for anxiety, stopped 9/22  - seroquel 25mg QHS for anxi delirium d/c'd,  may have caused lethargy and hypotension, , currently does not require any meds  - CTH 9/14 no acute findings  - C/w aggressive PT/OT - Oob to chair twice daily, currently with kandi lift,  increase activity as tolerated    ENT  #Epistaxis   Small amount of intermittent bleeding noted in b/l nares and hemoptysis?  - Will continue with afrin and nebulized TXA prn; once able to tolerate food, we will remove the NG tube  - humidified air provided via face tent  - Nasal saline spray 1 spray each nose PRN   - Afrin 1 spray for rebleeding followed by direct pressure on the nose, if still actively bleeding, call ENT for reassessment  - monitor hgb and for signs of overt bleeding closely       PULMONARY  Admitted to [facility] on 8/26 after p/w SOB, found to be hypoxemic, required 2-4L NC/bibap, initially responded well to IV steroids. 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 2/2 bacterial PNA vs atypical PNA vs aspiration vs AEILD continued to show significant improvement from a lung perspective and was decannulated 9/21, and extubated to HFNC 9/22, now tolerating 50% face tent.  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   - 9/20 BAL culture: normal respiratory selvin  - continue duonebs  - s/p decannulation on 9/21 with plan for suture removal ~7-10 days   - extubated 9/22 to HFNC   - weaned from HFNC to 6L nasal cannula, given epistaxis currently on face tent. Will continue with face tent until epistaxis fully resolves.  - CT 9/30 w/reticular peripheral lung opacities, LL atelectasis, and bilateral GGO's, improved compared to prior image (9/14) findings that showed extensive lung consolidations    CARDIOVASCULAR  Hx of Afib w at OSH, started on Amiodarone gtt now in shock state requiring requiring pressors likely septic with component of vaspoplegia i/s/o sedation, possible contribution of cardiogenic from RV dysfunction. Had episodes of SVT (9/25) responsive to lopressor   - No PE seen on invasive pulmonary angiography at time of ECMO cannulation or in recent imaging  - NO2 weaned off  (9/15) RV function remains unchanged  - s/p milrinone, off since (9/13)  - converted to sinus (9/14) discontinued amio gtt iso bradycardia, back to AF with RVR on 9/19 when sedation weaned off  - continue 25mg metoprolol BID for rate control   - Phenylephrine off since (9/25),  - continue midodrine to assist with blood pressure, dose lowered from 20mg to 10mg q8 hours iso bradycardia; will titrate down as tolerated  - RITCHIE 9/14 : No MEREDITH thrombus noted, negative for PFO. LVOT diameter of 2 cm  - TTE 9/12 with EF 65-70% with normal LV and RV  - TTE 9/14 with  EF 18%. Severe global LV systolic dysfunction. RV enlargement with decreased RV systolic function, however RITCHIE and recent POCUS shows normal LV systolic function, improved RV systolic function   - TTE 9/19 with recovered EF to 67% but still with RV enlargement and systolic dysfunction  - Repeat TTE pending       GASTROINTESTINAL  Transaminase elevation likely 2/2 combination of shock liver, malposition of ECMO cannula and liver dysfunction  - ALK/ AST/ALT downtrending but remain elevated 624/105/177, T.bili peaked 19.3 mostly direct, and now 9.4  - Acute hepatitis panel negative  - RUQ US 9/15 shows fatty infiltration of liver, negative for hepatobiliary pathology, repeat RUQ US performed 9/22 for worsening LFT's and rising bili with findings negative as well  - Hepatology consulted - likely shock liver with expected delay in improvement in bilirubin  - Hypertriglyceridemia 836, hx of fatty liver and propofol gtt, s/p insulin gtt, now improving  - Given previous watery bowel movements, will continue with senna only at this time. Last BM 10/01   - failed dysphagia screen x2, received continuous TF via KFT, today  cineesophagogram as phonation improves  - nutrition following- recommend changing TF formula to Peptamen to decrease GI discomfort  - CT abdomen 9/15 w/o bowel obstruction, noted with colonic diverticulosis mostly in sigmoid, w/o evidence of diverticulitis  - CT abdomen 9/30 w/ sigmoid diverticulosis, without evidence of acute diverticulitis, w/distended gallbladder w/sludge   - HIDA scan 10/01 obtained iso recent imaging, leukocytosis and abdominal discomfort, however no evidence found of acute cholecystitis or biliary obstruction       RENAL  sCr baseline 0.78  - Creatinine wnl  - I/O's:  negative -8.4L/LOS and negative -1040mL/24 hours  - s/p diuresis with lasix, now given PRN, goal to keep net even to slightly negative  - good UO with primafit  - hypernatremia improved, free H2O discontinued 9/21      INFECTIOUS DISEASE  Leukocytosis  - Afebrile, however WBC continues to uptrend, likely iso filgrastim (initiated 9/17-9/21 for leukopenia) vs infection?    - blood cultures negative to date, sputum and right groin (s/p ecmo cannula site) - send wound culture if drainage present  - UA+ with many bacteria, however urine culture negative, s/p CTX 1gm IVPB daily (9/27-10/1 ) and empiric Vanco (9/29-10/1)   - CT imaging CAP 9/30 and HIDA scan 10/01 obtained iso leukocytosis,  findings with no evidence of infection/abscess  - previous cultures, BAL, cytopathology so far negative  - leukopenia now resolved s/p filgrastim (9/17-9/21)  - vanco restarted for ppx on 9/18 d/t leukopenia but stopped on 9/20  - Bactrim DS discontinued iso leukopenia, continue Mepron for PJP prophylaxis instead  - s/p IV Ceftriaxone 5 days? at [facility] out of an abundance of precaution to cover BAL specimen  - s/p zosyn at OSH (9/12) for lingula PNA  - s/p vancomycin (9/12-9/15 ) and azithro (9/14-9/15)   - s/p empiric donald (9/13-9/20) now that ANC >1.5  - positive COVID-19 antigen in late August, recent RVP negative 9/30  - ID following- f/u recs      ENDOCRINE  # Hyperglycemia i/s/o steroids  Admission A1c 6.1  - s/p insulin gtt, transitioned to NPH 6u q6 with mod ISS while receiving TF, will change to Lantus 10 units QHS with mod ISS for now  - continue to adjust insulin and ISS as steroids are tapered and diet changes  - elevated triglycerides 835, has hx of fatty liver, had been on propofol gtt. TG downtrending since initiating insulin gtt for hyperglycemia, now normalizing off propofol  - TSH low initially at 0.13 repeat normal     HEME  #Right IJ thrombus   - s/p argatroban gtt , transitioned to lovenox   - Lowered lovenox dose [id number] BID to 100mg BID based on elevated Anti Xa level (1.14), level redrawn (1.08) now lowered to 90mg iso epistaxis, next Anti Xa level due 10/4 at 1300  - INR elevated likely iso argatroban and liver dysfxn, s/p Vit. K (9/14) and FFP x1 (9/15) - now resolved  - platelets x 7 for thrombocytopenia <50,000, last transfused  9/21 -now improved  - Intermittent episodes of epistaxis, no overt bleeding noted. Hgb slight downtrend but remains stable, likely anemia critical illness and recent epistaxis    #Leukopenia  #Thrombocytopenia  - Thrombocytopenia refractory after intermittent transfusions while on circuit  - Heme consult placed for thrombocytopenia, noted to also be leukopenic but now resolved   - unlikely HLH/MAS since many abnormalities can be explained by severe hypoxemia/hypotension during initial decompensation prior to ecmo cannulation but some features that are consistent and with rheumatic disease it is a possibility to f/u hematology regarding utility of further lab/pathologic testing  - peripheral blood smear reviewed: large platelets noted, no platelet clumps, no blast cells noted, neutrophils noted w/ normal number of lobes, nucleated RBC noted  - acute hepatitis panel negative  - s/p filgrastim 480 daily  given ANC >1500 in the setting of possible infxn       #R/O DVT  - LE duplex negative for DVT   - first VA duplex post decannulation 9/21 shows non-occlusive deep vein thrombosis in the right jugular vein and the right cephalic is non-compressible  - continue lovenox      MSK  Onset of debilitating b/l hand cramps and some muscles weakness x several months, unclear etiology, radiculopathy? vs myositis?   - MRI outpatient reportedly showed cervical spine issues, started on Prednisone shortly before admission at OSH  - myomarker panel + for RNP and Ku  - seen by neurologist at [facility]   - symptoms improved with cellcept (9/8-9/11) but discontinued given Afib RVR   - f/u with Dr. Nik Marie or Dante Urbano for EMG upon discharge (osh?)      RHEUM  - started on Solumedrol 60mg q6h from 9/1 to 9/6 then weaned over time to then Medrol 32 mg 9/9 to 9/12 at [facility]  - s/p Cellcept 9/8 to 9/11 but stopped due to Afib- RVR/tachycardia   - CT findings, Improved/decreased extent of bilateral ground glass opacities and reticular markings compared to the previous study. Findings are nonspecific but differential etiologies include interstitial pneumonia, drug toxicity, nonspecific connective tissue disorders.  - OSH labs show strongly positive ARIANA, RNP, and anti smith antibodies with low C4 and normal C3. 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  - s/p rituximab 9/16/23  - plan for second rituximab dose was for ~9/30/23 but will hold off for now given patient currently receiving antibiotics       SKIN  Reportedly had single episode of painful rash on her shins, ears and neck and chest which resolved with a steroid cream from a dermatologist prior to admission  - no evidence of rash currently  - right groin with breakdown s/p cannula placement may be possible source of infection - wound culture not collected, no drainage noted  - as per wound care,  area does not appear infected       PROPHYLAXIS  # DVT: Lovenox  # GI: TF      LINES  -Ecmo Cannulas 9/13-9/21  -LIJ TLC- 9/13-9/21  -Left Ax Traci - 9/13-9/27 (placed at OSH)  -RA 16g PIV x2- 9/15              For Follow-Up:  [ ] TTE pending  [ ] Anti Xa level 10/04 at 1300   [ ] Monitor blood glucose closely, transitioned off Tube feeds to PO diet, will need to adjust Lantus as needed                                                          138    |  99     |  34                  Calcium: 9.4   / iCa: x      (10-02 @ 00:40)    ----------------------------<  161       Magnesium: 2.20                             3.6     |  31     |  0.43             Phosphorous: 3.3      TPro  x      /  Alb  x      /  TBili  8.9    /  DBili  7.6    /  AST  x      /  ALT  x      /  AlkPhos  x      02 Oct 2023 01:20 MICU Transfer Note    Transfer from: MICU  Transfer to:  (  ) Medicine    (  ) Telemetry    (x) RCU    (  ) Palliative    (  ) Stroke Unit    (  ) _______________  Accepting physician: MD Addy Powers     PMHx  62 year old female with a past medical history of afib, and sciatica, who presented to [facility] for shortness of breath. She had gone to [facility] [place] where she had CXR showing bilateral lower lobe infiltrates so was sent to ER. She had been having exertional dyspnea and hypoxemia (pulse ox at home 82%). She had been having dyspnea for several months and had a workup in [place] with a CT scan (which was negative for PE). She also states that she was seen by a pulmonologist and rheumatology, where they ruled out lupus and other immunological disorders.        [facility] Hospital Course  Found to be hypoxic and have interstitial lung disease appearance on CT, admitted 8/26 for AHRF, further ILD workup and management. TTE 8/26 with normal LVEF. Pt was started on prednisone on admission with gradually improving O2 requirement and has been stable on 2-3LNC since 9/3. Started steroid taper on 9/6 and fully transitioned to PO 9/8 overnight. Started on Mycophenolate mofetil (cellcept) 9/8 evening but pt reported subjective side effects with weakness. Episode of AF w RVR with HR up to 140s on 9/11 am, decreased to HR 10-110s with IV lopressor 10. CTA negative for PE and showed interval improvements in GGO but possible lingular bleb and RML bronchiectasis. Patient received 2L of but before more fluids and beta-blocker pt developed heart palpitations with EKG HR 170s with SVT. BPs 105/70s. Did not respond to vagal maneuvers. Pt given oral lopressor 12.5 and IV lopressor 5, with improvement of HR to 130s. SBP briefly dropped to 60-70s then recovered. Patient on NRB offered HFNC/BiPAP but refused. Started on empiric vancomycin and zosyn. BCx w/ NGTD. Per pulm increased solumedrol to 40mg qd. Patient acceded to HFNC in which she desatted and presented with increased wob for which she was transitioned to BiPAP. Patient with anxiety while on BiPAP presenting tachypnea and desatting to the 80s despite verbal redirection for which she was administered ativan IVP. Patient distress improved with sats in the low 90s but had desaturation to 70s. STAT CXR showed increased pulmonary congestion for which patient was administered lasix without improvement. Intubated and started on mechanical ventilation but required very high PEEP (18) and 100% FiO2 and still had low saturations.      VV ECMO team consulted and accepted to [facility]. Per signout patient had RV enlargement and dysfunction on POCUS with concern for either new PE vs high pulmonary pressures due to PEEP 18 and lung dysfunction. Despite best practices, patient remained hypoxemic and patient was cannulated for VV-ECMO on 9/13 at [facility] with flouro showing no acute PE. Cannulated with 25 F drainage cannula in R Fem V and 23F “arterial” cannula in RIJ.       MICU COURSE:  Transferred to [facility] for further management. 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 Showed significant lung improvement and was decannulated 9/21. Extubated to HFNC 9/22 and then FT 50% d/t episodes of epistaxis         ASSESSMENT & PLAN:     61 yo F w/ Afib initially presented to urgent care for SOB where she had an XR done concerning for b/l lower infiltrates, sent to [facility] ED and admitted to  on 8/26 after being found to be hypoxemic, reported having  debilitating b/l hand numbness/tingling and some muscles weakness several months. She was found to have interstitial lung disease appearance on CT, with the plan for further ILD workup and management, started on prednisone on admission with gradually improving O2 requirements and stable on 2-3LNC. She underwent bronchoscopy with TBBX on 8/30 which showed Non-Specific Intersitial Disease (NSIP)/OP. Labs notable for positive ARIANA 1:1280, RNP > 8, Alejandro > 8. Patient continued on steroids and received cellcept, which was discontinued 2/2 Afib-RVR. Interval CTA chest on 9/11 also negative PE did show possible lingula pneumonia. Started on empiric vancomycin and zosyn 9/12, course was then c/b episode of SVT to 170s w/ rapidly progressive hypoxemic respiratory failure, placed NRB offered HFNC/BiPAP but refused, leading to worsening hypoxemic respiratory failure requiring intubation on 9/13. Despite best practices, patient remained hypoxemic and patient was cannulated for VV-ECMO on 9/13 and transferred to [facility] for further management. 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 Showed significant lung improvement and was decannulated 9/21. Extubated to HFNC 9/22. Currently on FT 50% given recent episode of epistaxis.     NEUROLOGY  Home meds: gabapentin 100g TID; holding for now  AA&Ox3 at baseline   - following commands w/ delirium likely iso prolonged hospitalization, sedation, and ICU setting. Now improved; following commands and understands conversation.  - required precedex for anxiety, stopped 9/22  - seroquel 25mg QHS for anti +delirium d/c'd,  may have caused lethargy and hypotension, , currently does not require any meds  - CTH 9/14 no acute findings  - C/w aggressive PT/OT - Oob to chair twice daily, currently with kandi lift,  increase activity as tolerated  - Social work - [facility]?   - PM&R consult     ENT  #Epistaxis   Small amount of intermittent bleeding noted in b/l nares and hemoptysis?  - Will continue with afrin and nebulized TXA prn; once able to tolerate food, we will remove the NG tube  - humidified air provided via face tent  - Nasal saline spray 1 spray each nose PRN   - Afrin 1 spray for rebleeding followed by direct pressure on the nose, if still actively bleeding, call ENT for reassessment  - monitor hgb and for signs of overt bleeding closely       PULMONARY  Admitted to [facility] on 8/26 after p/w SOB, found to be hypoxemic, required 2-4L NC/bibap, initially responded well to IV steroids. 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 2/2 bacterial PNA vs atypical PNA vs aspiration vs AEILD continued to show significant improvement from a lung perspective and was decannulated 9/21, and extubated to HFNC 9/22, now tolerating 50% face tent.  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   - 9/20 BAL culture: normal respiratory selvin  - continue duonebs  - s/p decannulation on 9/21 with plan for suture removal ~7-10 days   - extubated 9/22 to HFNC   - weaned from HFNC to 6L nasal cannula, given epistaxis currently on face tent. Will continue with face tent until epistaxis fully resolves.  - CT 9/30 w/reticular peripheral lung opacities, LL atelectasis, and bilateral GGO's, improved compared to prior image (9/14) findings that showed extensive lung consolidations    CARDIOVASCULAR  Hx of Afib w at OSH, started on Amiodarone gtt now in shock state requiring requiring pressors likely septic with component of vaspoplegia i/s/o sedation, possible contribution of cardiogenic from RV dysfunction. Had episodes of SVT (9/25) responsive to lopressor   - No PE seen on invasive pulmonary angiography at time of ECMO cannulation or in recent imaging  - NO2 weaned off  (9/15) RV function remains unchanged  - s/p milrinone, off since (9/13)  - converted to sinus (9/14) discontinued amio gtt iso bradycardia, back to AF with RVR on 9/19 when sedation weaned off  - continue 25mg metoprolol BID for rate control   - Phenylephrine off since (9/25),  - continue midodrine to assist with blood pressure, dose lowered from 20mg to 10mg q8 hours iso bradycardia; will titrate down as tolerated  - RITCHIE 9/14 : No MEREDITH thrombus noted, negative for PFO. LVOT diameter of 2 cm  - TTE 9/12 with EF 65-70% with normal LV and RV  - TTE 9/14 with  EF 18%. Severe global LV systolic dysfunction. RV enlargement with decreased RV systolic function, however RITCHIE and recent POCUS shows normal LV systolic function, improved RV systolic function   - TTE 9/19 with recovered EF to 67% but still with RV enlargement and systolic dysfunction  - Repeat TTE pending       GASTROINTESTINAL  Transaminase elevation likely 2/2 combination of shock liver, malposition of ECMO cannula and liver dysfunction  - ALK/ AST/ALT downtrending but remain elevated 624/105/177, T.bili peaked 19.3 mostly direct, and now 9.4  - Acute hepatitis panel negative  - RUQ US 9/15 shows fatty infiltration of liver, negative for hepatobiliary pathology, repeat RUQ US performed 9/22 for worsening LFT's and rising bili with findings negative as well  - Hepatology consulted - likely shock liver with expected delay in improvement in bilirubin  - Hypertriglyceridemia 836, hx of fatty liver and propofol gtt, s/p insulin gtt, now improving  - Given previous watery bowel movements, will continue with senna only at this time. Last BM 10/01   - failed dysphagia screen x2, received continuous TF via KFT  - cineesophagogram today, will start recommended diet minced/moist and thin liquids, keep KFT for now, remove if patient meets nutritional requirements with oral intake  - CT abdomen 9/15 w/o bowel obstruction, noted with colonic diverticulosis mostly in sigmoid, w/o evidence of diverticulitis  - CT abdomen 9/30 w/ sigmoid diverticulosis, without evidence of acute diverticulitis, w/distended gallbladder w/sludge   - HIDA scan 10/01 obtained iso recent imaging, leukocytosis and abdominal discomfort, however no evidence found of acute cholecystitis or biliary obstruction       RENAL  sCr baseline 0.78  - Creatinine wnl  - I/O's:  negative -8.4L/LOS and negative -1040mL/24 hours  - s/p diuresis with lasix, now given PRN, goal to keep net even to slightly negative  - good UO with primafit  - hypernatremia improved, free H2O discontinued 9/21      INFECTIOUS DISEASE  Leukocytosis  - Afebrile, however WBC continues to uptrend, likely iso filgrastim (initiated 9/17-9/21 for leukopenia) vs infection?    - blood cultures negative to date, sputum and right groin (s/p ecmo cannula site) - send wound culture if drainage present  - UA+ with many bacteria, however urine culture negative, s/p CTX 1gm IVPB daily (9/27-10/1 ) and empiric Vanco (9/29-10/1)   - CT imaging CAP 9/30 and HIDA scan 10/01 obtained iso leukocytosis,  findings with no evidence of infection/abscess  - previous cultures, BAL, cytopathology so far negative  - leukopenia now resolved s/p filgrastim (9/17-9/21)  - vanco restarted for ppx on 9/18 d/t leukopenia but stopped on 9/20  - Bactrim DS discontinued iso leukopenia, continue Mepron for PJP prophylaxis instead  - s/p IV Ceftriaxone 5 days? at [facility] out of an abundance of precaution to cover BAL specimen  - s/p zosyn at OSH (9/12) for lingula PNA  - s/p vancomycin (9/12-9/15 ) and azithro (9/14-9/15)   - s/p empiric donald (9/13-9/20) now that ANC >1.5  - positive COVID-19 antigen in late August, recent RVP negative 9/30  - ID following- f/u recs      ENDOCRINE  # Hyperglycemia i/s/o steroids  Admission A1c 6.1  - s/p insulin gtt, transitioned to NPH 6u q6 with mod ISS while receiving TF, will change to Lantus 10 units QHS with mod ISS for now  - continue to adjust insulin and ISS as steroids are tapered and diet changes  - elevated triglycerides 835, has hx of fatty liver, had been on propofol gtt. TG downtrending since initiating insulin gtt for hyperglycemia, now normalizing off propofol  - TSH low initially at 0.13 repeat normal     HEME  #Right IJ thrombus   - s/p argatroban gtt , transitioned to lovenox   - Lowered lovenox dose [id number] BID to 100mg BID based on elevated Anti Xa level (1.14), level redrawn (1.08) now lowered to 90mg iso epistaxis, next Anti Xa level due 10/4 at 1300  - INR elevated likely iso argatroban and liver dysfxn, s/p Vit. K (9/14) and FFP x1 (9/15) - now resolved  - platelets x 7 for thrombocytopenia <50,000, last transfused  9/21 -now improved  - Intermittent episodes of epistaxis, no overt bleeding noted.   - Hgb slight downtrend but remains stable, likely anemia in critical illness and recent episodes of epistaxis    #Leukopenia  #Thrombocytopenia  - Thrombocytopenia refractory after intermittent transfusions while on circuit  - Heme consult placed for thrombocytopenia, noted to also be leukopenic but now resolved   - unlikely HLH/MAS since many abnormalities can be explained by severe hypoxemia/hypotension during initial decompensation prior to ecmo cannulation but some features that are consistent and with rheumatic disease it is a possibility to f/u hematology regarding utility of further lab/pathologic testing  - peripheral blood smear reviewed: large platelets noted, no platelet clumps, no blast cells noted, neutrophils noted w/ normal number of lobes, nucleated RBC noted  - acute hepatitis panel negative  - s/p filgrastim 480 daily  given ANC >1500 in the setting of possible infxn       #R/O DVT  - LE duplex negative for DVT   - first VA duplex post decannulation 9/21 shows non-occlusive deep vein thrombosis in the right jugular vein and the right cephalic is non-compressible  - continue lovenox      MSK  Onset of debilitating b/l hand cramps and some muscles weakness x several months, unclear etiology, radiculopathy? vs myositis?   - MRI outpatient reportedly showed cervical spine issues, started on Prednisone shortly before admission at OSH  - myomarker panel + for RNP and Ku  - seen by neurologist at [facility]   - symptoms improved with cellcept (9/8-9/11) but discontinued given Afib RVR   - f/u with Dr. Nik Marie or Dante Urbano for EMG upon discharge (osh?)      RHEUM  - started on Solumedrol 60mg q6h from 9/1 to 9/6 then weaned over time to then Medrol 32 mg 9/9 to 9/12 at [facility]  - s/p Cellcept 9/8 to 9/11 but stopped due to Afib- RVR/tachycardia   - CT findings, Improved/decreased extent of bilateral ground glass opacities and reticular markings compared to the previous study. Findings are nonspecific but differential etiologies include interstitial pneumonia, drug toxicity, nonspecific connective tissue disorders.  - OSH labs show strongly positive ARIANA, RNP, and anti smith antibodies with low C4 and normal C3. 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  - s/p rituximab 9/16/23  - plan for second rituximab dose was for ~9/30/23 but will hold off for now given patient currently receiving antibiotics       SKIN  Reportedly had single episode of painful rash on her shins, ears and neck and chest which resolved with a steroid cream from a dermatologist prior to admission  - no evidence of rash currently  - right groin with breakdown s/p cannula placement may be possible source of infection - wound culture not collected, no drainage noted  - as per wound care,  area does not appear infected       PROPHYLAXIS  # DVT: Lovenox  # GI: TF      LINES  -Ecmo Cannulas 9/13-9/21  -LIJ TLC- 9/13-9/21  -Left Ax [place] - 9/13-9/27 (placed at OSH)  -RA 16g PIV x2- 9/15              For Follow-Up:  [ ] F/u with PT - acute vs subacute rehab?   [ ] Follow-up Rheum - 2nd session of rituximab 10/3? Then plan for steroid taper  [ ] TTE pending  [ ] Anti Xa level 10/04 at 1300   [ ] Monitor blood glucose closely, transitioned off Tube feeds to PO diet, adjust Lantus as needed  [ ] RIJ and Right Femoral (post cannula site) suture needs to be removed by CT surgery  [ ] Lovenox vs DOAC? MICU Transfer Note    Transfer from: MICU  Transfer to:  (  ) Medicine    (  ) Telemetry    (x) RCU    (  ) Palliative    (  ) Stroke Unit    (  ) _______________  Accepting physician: MD Addy Powers   Room: 647    PMHx  -62 year old female with a past medical history of afib, and sciatica, who presented to [facility] for shortness of breath. She had gone to [facility] where she had CXR showing bilateral lower lobe infiltrates so was sent to ER. She had been having exertional dyspnea and hypoxemia (pulse ox at home 82%). She had been having dyspnea for several months and had a workup in [place] with a CT scan (which was negative for PE). She also states that she was seen by a pulmonologist and rheumatology, where they ruled out lupus and other immunological disorders.    [facility] Hospital Course  -Found to be hypoxic and have interstitial lung disease appearance on CT, admitted 8/26 for AHRF, further ILD workup and management. TTE 8/26 with normal LVEF. Pt was started on prednisone on admission with gradually improving O2 requirement and has been stable on 2-3LNC since 9/3. Started steroid taper on 9/6 and fully transitioned to PO 9/8 overnight. Started on Mycophenolate mofetil (cellcept) 9/8 evening but pt reported subjective side effects with weakness. Episode of AF w RVR with HR up to 140s on 9/11 am, decreased to HR 10-110s with IV lopressor 10. CTA negative for PE and showed interval improvements in GGO but possible lingular bleb and RML bronchiectasis. Patient received 2L of but before more fluids and beta-blocker pt developed heart palpitations with EKG HR 170s with SVT. BPs 105/70s. Did not respond to vagal maneuvers. Pt given oral lopressor 12.5 and IV lopressor 5, with improvement of HR to 130s. SBP briefly dropped to 60-70s then recovered. Patient on NRB offered HFNC/BiPAP but refused. Started on empiric vancomycin and zosyn. BCx w/ NGTD. Per pulm increased solumedrol to 40mg qd. Patient acceded to HFNC in which she desatted and presented with increased wob for which she was transitioned to BiPAP. Patient with anxiety while on BiPAP presenting tachypnea and desatting to the 80s despite verbal redirection for which she was administered ativan IVP. Patient distress improved with sats in the low 90s but had desaturation to 70s. STAT CXR showed increased pulmonary congestion for which patient was administered lasix without improvement. Intubated and started on mechanical ventilation but required very high PEEP (18) and 100% FiO2 and still had low saturations.      VV ECMO team consulted and accepted to [facility] center. Per signout patient had RV enlargement and dysfunction on POCUS with concern for either new PE vs high pulmonary pressures due to PEEP 18 and lung dysfunction. Despite best practices, patient remained hypoxemic and patient was cannulated for VV-ECMO on 9/13 at [facility] with flouro showing no acute PE. Cannulated with 25 F drainage cannula in R Fem V and 23F “arterial” cannula in RIJ.       MICU COURSE:  -Transferred to [facility] for further management of VV ECMO. Arrived with nitric oxide 40PPM, primacor, amiodarone gtt, with multiple vasopressors, which has since been weaned off. 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 Thrombocytopenic while on ECMO circuit, received several platelet transfusions. Showed significant lung improvement and was decannulated 9/21. Extubated to HFNC 9/22, then FT 50% d/t episodes of epistaxis, and is currently tolerating 5L NC.  Failed swallow eval x2, required tube feeds via NGT, passed cinesophagram, now on PO diet. Course was also c/b leukopenia, is s/p fligrastim and now with leukocytosis of unknown origin w/o fever, cultures and imaging are negative of any infectious etiology, s/p empiric Vanco and CTX. WBC now downtrending. Tolerating and will continue to require aggressive PT. Patient is stable for transfer to RCU.         ASSESSMENT & PLAN:   61 yo F w/ Afib initially presented to urgent care for SOB where she had an XR done concerning for b/l lower infiltrates, sent to [facility] ED and admitted to  on 8/26 after being found to be hypoxemic, reported having  debilitating b/l hand numbness/tingling and some muscles weakness several months. She was found to have interstitial lung disease appearance on CT, with the plan for further ILD workup and management, started on prednisone on admission with gradually improving O2 requirements and stable on 2-3LNC. She underwent bronchoscopy with TBBX on 8/30 which showed Non-Specific Intersitial Disease (NSIP)/OP. Labs notable for positive ARIANA 1:1280, RNP > 8, Alejandro > 8. Patient continued on steroids and received cellcept, which was discontinued 2/2 Afib-RVR. Interval CTA chest on 9/11 also negative PE did show possible lingula pneumonia. Started on empiric vancomycin and zosyn 9/12, course was then c/b episode of SVT to 170s w/ rapidly progressive hypoxemic respiratory failure, placed NRB offered HFNC/BiPAP but refused, leading to worsening hypoxemic respiratory failure requiring intubation on 9/13. Despite best practices, patient remained hypoxemic and patient was cannulated for VV-ECMO on 9/13 and transferred to [facility] for further management. 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 Found to have elevated LFT's/Bili rising likely shock liver and shock state, now improving. Showed significant lung improvement and was decannulated 9/21. Extubated to HFNC 9/22. Currently tolerating 5L NC.    NEUROLOGY  Home meds: gabapentin 100g TID; holding for now  AA&Ox3 at baseline   - following commands w/ delirium likely iso prolonged hospitalization, sedation, and ICU setting. Now improved; following commands and understands conversation.  - required precedex for anxiety, stopped 9/22  - seroquel 25mg QHS for anti +delirium d/c'd,  may have caused lethargy and hypotension, , currently does not require any meds  - CTH 9/14 no acute findings  - C/w aggressive PT/OT - Oob to chair twice daily, currently with kandi lift,  increase activity as tolerated  - Social work - [facility]?   - PM&R consult     ENT  #Epistaxis   Small amount of intermittent bleeding noted in b/l nares and hemoptysis?  - Will continue with afrin and nebulized TXA prn; once able to tolerate food, we will remove the NG tube  - humidified air provided via face tent  - Nasal saline spray 1 spray each nose PRN   - Afrin 1 spray for rebleeding followed by direct pressure on the nose, if still actively bleeding, call ENT for reassessment  - monitor hgb and for signs of overt bleeding closely       PULMONARY  Admitted to [facility] on 8/26 after p/w SOB, found to be hypoxemic, required 2-4L NC/bibap, initially responded well to IV steroids. 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 2/2 bacterial PNA vs atypical PNA vs aspiration vs AEILD continued to show significant improvement from a lung perspective and was decannulated 9/21, and extubated to HFNC 9/22, now tolerating 50% face tent.  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   - 9/20 BAL culture: normal respiratory selvin  - continue duonebs  - s/p decannulation on 9/21 with plan for suture removal ~7-10 days   - extubated 9/22 to HFNC   - weaned from HFNC to 6L nasal cannula, given epistaxis currently on face tent. Will continue with face tent until epistaxis fully resolves.  - CT 9/30 w/reticular peripheral lung opacities, LL atelectasis, and bilateral GGO's, improved compared to prior image (9/14) findings that showed extensive lung consolidations    CARDIOVASCULAR  Hx of Afib w at OSH, started on Amiodarone gtt now in shock state requiring requiring pressors likely septic with component of vaspoplegia i/s/o sedation, possible contribution of cardiogenic from RV dysfunction. Had episodes of SVT (9/25) responsive to lopressor   - No PE seen on invasive pulmonary angiography at time of ECMO cannulation or in recent imaging  - NO2 weaned off  (9/15) RV function remains unchanged  - s/p milrinone, off since (9/13)  - converted to sinus (9/14) discontinued amio gtt iso bradycardia, back to AF with RVR on 9/19 when sedation weaned off  - continue 25mg metoprolol BID for rate control   - Phenylephrine off since (9/25),  - continue midodrine to assist with blood pressure, dose lowered from 20mg to 10mg q8 hours iso bradycardia; will titrate down as tolerated  - RITCHIE 9/14 : No MEREDITH thrombus noted, negative for PFO. LVOT diameter of 2 cm  - TTE 9/12 with EF 65-70% with normal LV and RV  - TTE 9/14 with  EF 18%. Severe global LV systolic dysfunction. RV enlargement with decreased RV systolic function, however RITCHIE and recent POCUS shows normal LV systolic function, improved RV systolic function   - TTE 9/19 with recovered EF to 67% but still with RV enlargement and systolic dysfunction  - Repeat TTE pending       GASTROINTESTINAL  Transaminase elevation likely 2/2 combination of shock liver, malposition of ECMO cannula and liver dysfunction  - ALK/ AST/ALT downtrending but remain elevated 624/105/177, T.bili peaked 19.3 mostly direct, and now 9.4  - Acute hepatitis panel negative  - RUQ US 9/15 shows fatty infiltration of liver, negative for hepatobiliary pathology, repeat RUQ US performed 9/22 for worsening LFT's and rising bili with findings negative as well  - Hepatology consulted - likely shock liver with expected delay in improvement in bilirubin  - Hypertriglyceridemia 836, hx of fatty liver and propofol gtt, s/p insulin gtt, now improving  - Given previous watery bowel movements, will continue with senna only at this time. Last BM 10/01   - failed dysphagia screen x2, received continuous TF via KFT  - cineesophagogram today, will start recommended diet minced/moist and thin liquids, keep KFT for now, remove if patient meets nutritional requirements with oral intake  - CT abdomen 9/15 w/o bowel obstruction, noted with colonic diverticulosis mostly in sigmoid, w/o evidence of diverticulitis  - CT abdomen 9/30 w/ sigmoid diverticulosis, without evidence of acute diverticulitis, w/distended gallbladder w/sludge   - HIDA scan 10/01 obtained iso recent imaging, leukocytosis and abdominal discomfort, however no evidence found of acute cholecystitis or biliary obstruction       RENAL  sCr baseline 0.78  - Creatinine wnl  - I/O's:  negative -8.4L/LOS and negative -1040mL/24 hours  - s/p diuresis with lasix, now given PRN, goal to keep net even to slightly negative  - good UO with primafit  - hypernatremia improved, free H2O discontinued 9/21      INFECTIOUS DISEASE  Leukocytosis  - Afebrile, however WBC continues to uptrend, likely iso filgrastim (initiated 9/17-9/21 for leukopenia) vs infection?    - blood cultures negative to date, sputum and right groin (s/p ecmo cannula site) - send wound culture if drainage present  - UA+ with many bacteria, however urine culture negative, s/p CTX 1gm IVPB daily (9/27-10/1 ) and empiric Vanco (9/29-10/1)   - CT imaging CAP 9/30 and HIDA scan 10/01 obtained iso leukocytosis,  findings with no evidence of infection/abscess  - previous cultures, BAL, cytopathology so far negative  - leukopenia now resolved s/p filgrastim (9/17-9/21)  - vanco restarted for ppx on 9/18 d/t leukopenia but stopped on 9/20  - Bactrim DS discontinued iso leukopenia, continue Mepron for PJP prophylaxis instead  - s/p IV Ceftriaxone 5 days? at [facility] out of an abundance of precaution to cover BAL specimen  - s/p zosyn at OSH (9/12) for lingula PNA  - s/p vancomycin (9/12-9/15 ) and azithro (9/14-9/15)   - s/p empiric donald (9/13-9/20) now that ANC >1.5  - positive COVID-19 antigen in late August, recent RVP negative 9/30  - ID following- f/u recs      ENDOCRINE  # Hyperglycemia i/s/o steroids  Admission A1c 6.1  - s/p insulin gtt, transitioned to NPH 6u q6 with mod ISS while receiving TF, will change to Lantus 10 units QHS with mod ISS for now  - continue to adjust insulin and ISS as steroids are tapered and diet changes  - elevated triglycerides 835, has hx of fatty liver, had been on propofol gtt. TG downtrending since initiating insulin gtt for hyperglycemia, now normalizing off propofol  - TSH low initially at 0.13 repeat normal     HEME  #Right IJ thrombus   - s/p argatroban gtt , transitioned to lovenox   - Lowered lovenox dose [id number] BID to 100mg BID based on elevated Anti Xa level (1.14), level redrawn (1.08) now lowered to 90mg iso epistaxis, next Anti Xa level due 10/4 at 1300  - INR elevated likely iso argatroban and liver dysfxn, s/p Vit. K (9/14) and FFP x1 (9/15) - now resolved  - platelets x 7 for thrombocytopenia <50,000, last transfused  9/21 -now improved  - Intermittent episodes of epistaxis, no overt bleeding noted.   - Hgb slight downtrend but remains stable, likely anemia in critical illness and recent episodes of epistaxis    #Leukopenia  #Thrombocytopenia  - Thrombocytopenia refractory after intermittent transfusions while on circuit  - Heme consult placed for thrombocytopenia, noted to also be leukopenic but now resolved   - unlikely HLH/MAS since many abnormalities can be explained by severe hypoxemia/hypotension during initial decompensation prior to ecmo cannulation but some features that are consistent and with rheumatic disease it is a possibility to f/u hematology regarding utility of further lab/pathologic testing  - peripheral blood smear reviewed: large platelets noted, no platelet clumps, no blast cells noted, neutrophils noted w/ normal number of lobes, nucleated RBC noted  - acute hepatitis panel negative  - s/p filgrastim 480 daily  given ANC >1500 in the setting of possible infxn       #R/O DVT  - LE duplex negative for DVT   - first VA duplex post decannulation 9/21 shows non-occlusive deep vein thrombosis in the right jugular vein and the right cephalic is non-compressible  - continue lovenox for now       MSK  Onset of debilitating b/l hand cramps and some muscles weakness x several months, unclear etiology, radiculopathy? vs myositis?   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 Nik Marie or Dante Urbano for EMG upon discharge (osh?)      RHEUM  - started on Solumedrol 60mg q6h from 9/1 to 9/6 then weaned over time to then Medrol 32 mg 9/9 to 9/12 at [facility]  - s/p Cellcept 9/8 to 9/11 but stopped due to Afib- RVR/tachycardia   - CT findings, Improved/decreased extent of bilateral ground glass opacities and reticular markings compared to the previous study. Findings are nonspecific but differential etiologies include interstitial pneumonia, drug toxicity, nonspecific connective tissue disorders.  - OSH labs show strongly positive ARIANA, RNP, and anti smith antibodies with low C4 and normal C3. 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  - s/p rituximab 9/16/23  - plan for second rituximab dose was for ~9/30/23 but will hold off for now given patient currently receiving antibiotics       SKIN  Reportedly had single episode of painful rash on her shins, ears and neck and chest which resolved with a steroid cream from a dermatologist prior to admission  - no evidence of rash currently  - right groin with breakdown s/p cannula placement may be possible source of infection - wound culture not collected, no drainage noted  - as per wound care,  area does not appear infected       PROPHYLAXIS  # DVT: Lovenox  # GI: TF      LINES  -Ecmo Cannulas 9/13-9/21  -LIJ TLC- 9/13-9/21  -Left Ax [place] - 9/13-9/27 (placed at OSH)  -RA 16g PIV x2- 9/15              For Follow-Up:  [ ] F/u with PT - acute vs subacute rehab?   [ ] Follow-up Rheum - 2nd session of rituximab 10/3? Then plan for steroid taper  [ ] TTE pending  [ ] Anti Xa level 10/04 at 1300   [ ] Monitor blood glucose closely, transitioned off Tube feeds to PO diet, adjust Lantus as needed  [ ] RIJ and Right Femoral (post cannula site) suture needs to be removed by CT surgery (tomorrow?)  [ ] Lovenox vs DOAC?

## 2023-10-02 NOTE — PROGRESS NOTE ADULT - TIME BILLING
As above
Time spent for extensive review of the physical chart, electronic medical record, and documentation to obtain collateral information including but not limited to:  [x ] Inpatient records (ED, H&P, primary team, and consultants if applicable, care coordination)  [x ] Inpatient values/results (biomarkers, immunoassays, imaging, and microbiology results)  [ x] Current or proposed treatment plans  [x ] Discussion with the primary team  [ x] Discussion with the patient, surrogate decision maker, or family    Time spent: >50min
Time spent for extensive review of the physical chart, electronic medical record, and documentation to obtain collateral information including but not limited to:  [x ] Inpatient records (ED, H&P, primary team, and consultants if applicable, care coordination)  [x ] Inpatient values/results (biomarkers, immunoassays, imaging, and microbiology results)  [ x] Current or proposed treatment plans  [x ] Discussion with the primary team  [ x] Discussion with the patient, surrogate decision maker, or family    Time spent: >50min

## 2023-10-02 NOTE — SWALLOW VFSS/MBS ASSESSMENT ADULT - RECOMMENDED CONSISTENCY
1) Minced and moist solids with thin liquids  2) Feeding/Swallowing Guidelines: Upright positioning, small single sips of thin liquids, maintain good oral hygiene  3) Aspiration/reflux precautions

## 2023-10-03 LAB
ALBUMIN SERPL ELPH-MCNC: 3.1 G/DL — LOW (ref 3.3–5)
ALP SERPL-CCNC: 538 U/L — HIGH (ref 40–120)
ALT FLD-CCNC: 210 U/L — HIGH (ref 4–33)
ANION GAP SERPL CALC-SCNC: 11 MMOL/L — SIGNIFICANT CHANGE UP (ref 7–14)
AST SERPL-CCNC: 176 U/L — HIGH (ref 4–32)
BILIRUB SERPL-MCNC: 9.9 MG/DL — HIGH (ref 0.2–1.2)
BUN SERPL-MCNC: 34 MG/DL — HIGH (ref 7–23)
CALCIUM SERPL-MCNC: 9.6 MG/DL — SIGNIFICANT CHANGE UP (ref 8.4–10.5)
CHLORIDE SERPL-SCNC: 99 MMOL/L — SIGNIFICANT CHANGE UP (ref 98–107)
CO2 SERPL-SCNC: 30 MMOL/L — SIGNIFICANT CHANGE UP (ref 22–31)
CREAT SERPL-MCNC: 0.4 MG/DL — LOW (ref 0.5–1.3)
CULTURE RESULTS: SIGNIFICANT CHANGE UP
EGFR: 110 ML/MIN/1.73M2 — SIGNIFICANT CHANGE UP
GLUCOSE BLDC GLUCOMTR-MCNC: 159 MG/DL — HIGH (ref 70–99)
GLUCOSE BLDC GLUCOMTR-MCNC: 178 MG/DL — HIGH (ref 70–99)
GLUCOSE BLDC GLUCOMTR-MCNC: 36 MG/DL — CRITICAL LOW (ref 70–99)
GLUCOSE BLDC GLUCOMTR-MCNC: 40 MG/DL — CRITICAL LOW (ref 70–99)
GLUCOSE BLDC GLUCOMTR-MCNC: 53 MG/DL — CRITICAL LOW (ref 70–99)
GLUCOSE BLDC GLUCOMTR-MCNC: 73 MG/DL — SIGNIFICANT CHANGE UP (ref 70–99)
GLUCOSE BLDC GLUCOMTR-MCNC: 78 MG/DL — SIGNIFICANT CHANGE UP (ref 70–99)
GLUCOSE BLDC GLUCOMTR-MCNC: 91 MG/DL — SIGNIFICANT CHANGE UP (ref 70–99)
GLUCOSE SERPL-MCNC: 138 MG/DL — HIGH (ref 70–99)
MAGNESIUM SERPL-MCNC: 2.4 MG/DL — SIGNIFICANT CHANGE UP (ref 1.6–2.6)
PHOSPHATE SERPL-MCNC: 2.9 MG/DL — SIGNIFICANT CHANGE UP (ref 2.5–4.5)
POTASSIUM SERPL-MCNC: 4.5 MMOL/L — SIGNIFICANT CHANGE UP (ref 3.5–5.3)
POTASSIUM SERPL-SCNC: 4.5 MMOL/L — SIGNIFICANT CHANGE UP (ref 3.5–5.3)
PROT SERPL-MCNC: 5.4 G/DL — LOW (ref 6–8.3)
SODIUM SERPL-SCNC: 140 MMOL/L — SIGNIFICANT CHANGE UP (ref 135–145)
SPECIMEN SOURCE: SIGNIFICANT CHANGE UP

## 2023-10-03 PROCEDURE — 99222 1ST HOSP IP/OBS MODERATE 55: CPT

## 2023-10-03 PROCEDURE — 99223 1ST HOSP IP/OBS HIGH 75: CPT

## 2023-10-03 RX ORDER — ACETAMINOPHEN 500 MG
650 TABLET ORAL ONCE
Refills: 0 | Status: COMPLETED | OUTPATIENT
Start: 2023-10-03 | End: 2023-10-03

## 2023-10-03 RX ORDER — SODIUM CHLORIDE 9 MG/ML
1000 INJECTION INTRAMUSCULAR; INTRAVENOUS; SUBCUTANEOUS
Refills: 0 | Status: DISCONTINUED | OUTPATIENT
Start: 2023-10-03 | End: 2023-10-04

## 2023-10-03 RX ORDER — MEPERIDINE HYDROCHLORIDE 50 MG/ML
25 INJECTION INTRAMUSCULAR; INTRAVENOUS; SUBCUTANEOUS ONCE
Refills: 0 | Status: DISCONTINUED | OUTPATIENT
Start: 2023-10-03 | End: 2023-10-05

## 2023-10-03 RX ORDER — RITUXIMAB 10 MG/ML
1000 INJECTION, SOLUTION INTRAVENOUS ONCE
Refills: 0 | Status: COMPLETED | OUTPATIENT
Start: 2023-10-03 | End: 2023-10-03

## 2023-10-03 RX ORDER — DEXTROSE 50 % IN WATER 50 %
25 SYRINGE (ML) INTRAVENOUS ONCE
Refills: 0 | Status: COMPLETED | OUTPATIENT
Start: 2023-10-03 | End: 2023-10-03

## 2023-10-03 RX ORDER — DEXTROSE 50 % IN WATER 50 %
12.5 SYRINGE (ML) INTRAVENOUS ONCE
Refills: 0 | Status: DISCONTINUED | OUTPATIENT
Start: 2023-10-03 | End: 2023-10-05

## 2023-10-03 RX ORDER — DIPHENHYDRAMINE HCL 50 MG
50 CAPSULE ORAL ONCE
Refills: 0 | Status: DISCONTINUED | OUTPATIENT
Start: 2023-10-03 | End: 2023-10-05

## 2023-10-03 RX ORDER — DEXTROSE 50 % IN WATER 50 %
15 SYRINGE (ML) INTRAVENOUS ONCE
Refills: 0 | Status: COMPLETED | OUTPATIENT
Start: 2023-10-03 | End: 2023-10-03

## 2023-10-03 RX ORDER — DIPHENHYDRAMINE HCL 50 MG
50 CAPSULE ORAL ONCE
Refills: 0 | Status: COMPLETED | OUTPATIENT
Start: 2023-10-03 | End: 2023-10-03

## 2023-10-03 RX ADMIN — MIDODRINE HYDROCHLORIDE 10 MILLIGRAM(S): 2.5 TABLET ORAL at 13:18

## 2023-10-03 RX ADMIN — SODIUM CHLORIDE 30 MILLILITER(S): 9 INJECTION INTRAMUSCULAR; INTRAVENOUS; SUBCUTANEOUS at 15:45

## 2023-10-03 RX ADMIN — ENOXAPARIN SODIUM 90 MILLIGRAM(S): 100 INJECTION SUBCUTANEOUS at 21:29

## 2023-10-03 RX ADMIN — Medication 50 MILLIGRAM(S): at 14:44

## 2023-10-03 RX ADMIN — MIDODRINE HYDROCHLORIDE 10 MILLIGRAM(S): 2.5 TABLET ORAL at 06:45

## 2023-10-03 RX ADMIN — Medication 3 MILLILITER(S): at 17:06

## 2023-10-03 RX ADMIN — Medication 3 MILLILITER(S): at 22:55

## 2023-10-03 RX ADMIN — Medication 1 DROP(S): at 06:46

## 2023-10-03 RX ADMIN — Medication 15 GRAM(S): at 08:41

## 2023-10-03 RX ADMIN — Medication 25 MILLIGRAM(S): at 17:17

## 2023-10-03 RX ADMIN — MIDODRINE HYDROCHLORIDE 10 MILLIGRAM(S): 2.5 TABLET ORAL at 21:29

## 2023-10-03 RX ADMIN — SENNA PLUS 10 MILLILITER(S): 8.6 TABLET ORAL at 21:29

## 2023-10-03 RX ADMIN — Medication 3 MILLILITER(S): at 12:18

## 2023-10-03 RX ADMIN — Medication 25 GRAM(S): at 08:00

## 2023-10-03 RX ADMIN — Medication 3 MILLILITER(S): at 04:38

## 2023-10-03 RX ADMIN — Medication 100 MILLIGRAM(S): at 14:45

## 2023-10-03 RX ADMIN — Medication 650 MILLIGRAM(S): at 14:46

## 2023-10-03 RX ADMIN — PANTOPRAZOLE SODIUM 40 MILLIGRAM(S): 20 TABLET, DELAYED RELEASE ORAL at 12:19

## 2023-10-03 RX ADMIN — CHLORHEXIDINE GLUCONATE 1 APPLICATION(S): 213 SOLUTION TOPICAL at 06:46

## 2023-10-03 RX ADMIN — Medication 2: at 21:28

## 2023-10-03 RX ADMIN — Medication 15 MILLILITER(S): at 12:18

## 2023-10-03 RX ADMIN — Medication 1 MILLIGRAM(S): at 12:19

## 2023-10-03 RX ADMIN — RITUXIMAB 1000 MILLIGRAM(S): 10 INJECTION, SOLUTION INTRAVENOUS at 15:46

## 2023-10-03 RX ADMIN — ATOVAQUONE 1500 MILLIGRAM(S): 750 SUSPENSION ORAL at 12:18

## 2023-10-03 RX ADMIN — Medication 25 MILLIGRAM(S): at 06:45

## 2023-10-03 RX ADMIN — Medication 1 DROP(S): at 17:16

## 2023-10-03 RX ADMIN — ENOXAPARIN SODIUM 90 MILLIGRAM(S): 100 INJECTION SUBCUTANEOUS at 10:21

## 2023-10-03 RX ADMIN — Medication 2: at 17:19

## 2023-10-03 NOTE — CHART NOTE - NSCHARTNOTEFT_GEN_A_CORE
Plan for rituximab infusion today.  Pt with no AM labs today, okay to continue with infusion.     Araseli Osborne MD   PGY-4  Reachable on TEAMS  Pager [phone]  Rheumatology Fellow

## 2023-10-03 NOTE — CONSULT NOTE ADULT - SUBJECTIVE AND OBJECTIVE BOX
HPI:  62 year old female with a past medical history of afib, and sciatica, who presented to [facility] for shortness of breath. She had gone to [facility] where she had CXR which showed bilateral lower lobe infiltrates so was sent to ER. She had been having exertional dyspnea and hypoxemia (on home pulse ox to 82%). She had been having dyspnea for several months and had a workup in [place] with a CT scan (which was negative for PE). She also states that she was seen by a pulmonologist and rheumatology, where they ruled out lupus and other immunological disorders.    [facility] Hospital Course  Found to be hypoxic and have interstitial lung disease appearance on CT, admitted 8/26 for AHRF, further ILD workup and management. TTE 8/26 with normal LVEF. Pt was started on prednisone on admission with gradually improving O2 requirement and has been stable on 2-3LNC since 9/3. Started steroid taper on 9/6 and fully transitioned to PO 9/8 overnight. Started on Mycophenolate mofetil (cellcept) 9/8 evening but pt reported subjective side effects with weakness. Episode of AF w RVR with HR up to 140s on 9/11 am, decreased to HR 10-110s with IV lopressor 10. CTA negative for PE and showed interval improvements in GGO but possible lingular bleb and RML bronchiectasis. Patient received 2L of but before more fluids and beta-blocker pt developed heart palpitations with EKG HR 170s with SVT. BPs 105/70s. Did not respond to vagal maneuvers. Pt given oral lopressor 12.5 and IV lopressor 5, with improvement of HR to 130s. SBP briefly dropped to 60-70s then recovered. Patient on NRB offered HFNC/BiPAP but refused. Started on empiric vancomycin and zosyn. BCx w/ NGTD. Per pulm increased solumedrol to 40mg qd. Patient acceded to HFNC in which she desatted and presented with increased wob for which she was transitioned to BiPAP. Patient with anxiety while on BiPAP presenting tachypnea and desatting to the 80s despite verbal redirection for which she was administered ativan 1mg IVP x1. Patient distress improved with sats in the low 90s but had desaturation to 70s. STAT CXR showed increased pulmonary congestion for which patient was administered lasix without improvement. Intubated and started on mechanical ventilation but required very high PEEP (18) and 100% FiO2 and still had low saturations. [facility]V ECMO team consulted and accepted to [facility] center. Per signout patient had RV enlargement and dysfunction on POCUS with concern for either new PE vs high pulmonary pressures due to PEEP 18 and lung dysfunction. Patient went for cannulation at [facility] with flouro showing no acute PE. Cannulated with 25 F drainage cannula in R Fem V and 23F “arterial” cannula in RIJ.    (13 Sep 2023 15:24)    Patient was admitted to [facility] MICU on 9/13, DA on bronchoscopy 9/13, s/p plasmapheresis, steroids, now rituximab, awaiting second dose, hospital course with thrombocytopenia, transfused platelets. Decannulated 9/21, now on nasal canula, s/p MBS, tolerating oral diet. Seen today in RCU, sig other and sister at bedside.     REVIEW OF SYSTEMS  Constitutional - No fever, No weight loss, No fatigue  HEENT - No eye pain, No visual disturbances, No difficulty hearing, No tinnitus, No vertigo, No neck pain  Respiratory - O2 by NC   Cardiovascular - No chest pain, No palpitations  Gastrointestinal - No abdominal pain, No nausea, No vomiting, No diarrhea, No constipation  Genitourinary - No dysuria, No frequency, No hematuria, No incontinence  Psychiatric - No depression, No anxiety    VITALS  T(C): 37 (10-03-23 @ 06:00), Max: 37.3 (10-02-23 @ 16:00)  HR: 67 (10-03-23 @ 06:00) (67 - 87)  BP: 102/63 (10-03-23 @ 06:00) (93/66 - 125/79)  RR: 18 (10-03-23 @ 06:00) (18 - 34)  SpO2: 97% (10-03-23 @ 06:00) (95% - 100%)  Wt(kg): --    PAST MEDICAL & SURGICAL HISTORY  see HPI     SOCIAL HISTORY  Smoking - Denied  EtOH - Denied   Drugs - Denied    FUNCTIONAL HISTORY  Lives with sig other, has elevator apt in [place], [place], [place], working as executive    Independent AMB and ADLs PTA     CURRENT FUNCTIONAL STATUS  10/2 SLP/MBS  mild oral dysphagia/mild pharyngeal dysphagia  minced moist solids, thin liquids    10/2 PT  transfers dependent with lift     9/26 OT  bed mobiilty max assist x 2        FAMILY HISTORY   no pertinent history in first degree relatives     RECENT LABS/IMAGING  CBC Full  -  ( 02 Oct 2023 00:40 )  WBC Count : 20.51 K/uL  RBC Count : 2.89 M/uL  Hemoglobin : 9.5 g/dL  Hematocrit : 29.8 %  Platelet Count - Automated : 251 K/uL  Mean Cell Volume : 103.1 fL  Mean Cell Hemoglobin : 32.9 pg  Mean Cell Hemoglobin Concentration : 31.9 gm/dL  Auto Neutrophil # : 18.36 K/uL  Auto Lymphocyte # : 0.29 K/uL  Auto Monocyte # : 0.88 K/uL  Auto Eosinophil # : 0.00 K/uL  Auto Basophil # : 0.06 K/uL  Auto Neutrophil % : 89.5 %  Auto Lymphocyte % : 1.4 %  Auto Monocyte % : 4.3 %  Auto Eosinophil % : 0.0 %  Auto Basophil % : 0.3 %    10-02    138  |  99  |  34<H>  ----------------------------<  161<H>  3.6   |  31  |  0.43<L>    Ca    9.4      02 Oct 2023 00:40  Phos  3.3     10-02  Mg     2.20     10-02    TPro  x   /  Alb  x   /  TBili  8.9<H>  /  DBili  7.6<H>  /  AST  x   /  ALT  x   /  AlkPhos  x   10-02    Urinalysis Basic - ( 02 Oct 2023 00:40 )    Color: x / Appearance: x / SG: x / pH: x  Gluc: 161 mg/dL / Ketone: x  / Bili: x / Urobili: x   Blood: x / Protein: x / Nitrite: x   Leuk Esterase: x / RBC: x / WBC x   Sq Epi: x / Non Sq Epi: x / Bacteria: x    < from: CT Head No Cont (09.14.23 @ 10:34) >    IMPRESSION: No evidence of acute hemorrhage, mass or mass effect.      < end of copied text >  < from: CT Chest w/ IV Cont (09.30.23 @ 15:20) >    IMPRESSION:  1.  Bilateral groundglass opacities consistent with pulmonary edema,   markedly improved from the previously seen extensive lung consolidations   on 9/14/2023.  2.  Interval removal of ECMO catheters. No right groin abscess or   evidence of infection at prior ECMO calculation site.      < end of copied text >      ALLERGIES  No Known Allergies      MEDICATIONS   acetaminophen     Tablet .. 650 milliGRAM(s) Oral once  albuterol/ipratropium for Nebulization 3 milliLiter(s) Nebulizer every 6 hours  aluminum hydroxide/magnesium hydroxide/simethicone Suspension 30 milliLiter(s) Oral every 4 hours PRN  artificial  tears Solution 1 Drop(s) Both EYES every 12 hours  atovaquone  Suspension 1500 milliGRAM(s) Oral daily  chlorhexidine 2% Cloths 1 Application(s) Topical <User Schedule>  dextrose 5%. 1000 milliLiter(s) IV Continuous <Continuous>  dextrose 50% Injectable 12.5 Gram(s) IV Push once  dextrose 50% Injectable 12.5 Gram(s) IV Push once  dextrose 50% Injectable 25 Gram(s) IV Push once  dextrose 50% Injectable 25 Gram(s) IV Push once  dextrose Oral Gel 15 Gram(s) Oral once PRN  diphenhydrAMINE Injectable 50 milliGRAM(s) IV Push once PRN  diphenhydrAMINE Injectable 50 milliGRAM(s) IV Push once  enoxaparin Injectable 90 milliGRAM(s) SubCutaneous <User Schedule>  folic acid 1 milliGRAM(s) Oral daily  glucagon  Injectable 1 milliGRAM(s) IntraMuscular once  insulin lispro (ADMELOG) corrective regimen sliding scale   SubCutaneous Before meals and at bedtime  meperidine     Injectable 25 milliGRAM(s) IV Push once PRN  methylPREDNISolone sodium succinate Injectable 100 milliGRAM(s) IV Push once  metoprolol tartrate 25 milliGRAM(s) Oral two times a day  midodrine 10 milliGRAM(s) Oral every 8 hours  multivitamin/minerals/iron Oral Solution (CENTRUM) 15 milliLiter(s) Oral daily  pantoprazole  Injectable 40 milliGRAM(s) IV Push daily  riTUXimab-pvvr (RUXIENCE) IVPB (eMAR) 1000 milliGRAM(s) IV Intermittent once  senna Syrup 10 milliLiter(s) Oral at bedtime  sodium chloride 0.65% Nasal 1 Spray(s) Both Nostrils every 8 hours PRN  sodium chloride 0.9%. 1000 milliLiter(s) IV Continuous <Continuous>  tranexamic acid Injectable for Nebulization 500 milliGRAM(s) Inhalation every 8 hours PRN      ----------------------------------------------------------------------------------------  PHYSICAL EXAM  Constitutional - NAD, Comfortable, in bed   +NGT  Chest - Breathing comfortably, +O2 by NC   Cardiovascular - S1S2   Abdomen - Soft   Extremities - LE edema   Neurologic Exam -    follows commands                Cognitive - Awake, Alert, AAO to self, place, date, year, situation     Communication - hypophonic      Cranial Nerves - CN 2-12 intact     Motor -                     LEFT    UE - ShAB 2/5, EF 4/5, EE 3/5, WE 4/5,  4/5                    RIGHT UE - ShAB 2/5, EF 2/5, EE 3/5, WE 3/5,  3/5                    LEFT    LE - HF 2/5, KE 3/5, DF 5/5, PF 5/5                    RIGHT LE - HF 2/5, KE 3/5, DF 5/5, PF 5/5        Sensory - Intact to LT     Psychiatric - Mood stable, Affect WNL  ----------------------------------------------------------------------------------------  ASSESSMENT/PLAN  62yFemale h/o afib with functional deficits due to ILD  s/p ECMO, steroids, plasmapheresis, rituxan, next dose today?  leukocytosis, off antibiotics   dysphagia, s/p MBS, on oral diet   Pain - Tylenol  DVT PPX - SCDs lovenox   Rehab - Will continue to follow for ongoing rehab needs and recommendations.   continue bedside therapy, PT/OT/SLP  last therapy notes with therapeutic exercises/lift for transfers to bed  would like to see some progress with functional mobility, bed mobility, sitting at side of bed    Recommend ACUTE inpatient rehabilitation for the functional deficits consisting of 3 hours of therapy/day & 24 hour RN/daily PMR physician for comorbid medical management. Patient will be able to tolerate 3 hours a day.

## 2023-10-03 NOTE — CHART NOTE - NSCHARTNOTEFT_GEN_A_CORE
** INCOMPLETE NOTE ** Notified by RN that patient hypoglycemic this morning. Patient assessed at bedside, awake/alert and oriented x3. Patient s/p 12.5g of D50 IVP and 15g oral dextrose gel with improvement. Lantus discontinued for now. Per partner at bedside, patient with poor PO intake yesterday. Eating breakfast this morning when assessed at bedside. Will continue to monitor.

## 2023-10-03 NOTE — PROVIDER CONTACT NOTE (HYPOGLYCEMIA EVENT) - NS PROVIDER CONTACT BACKGROUND-HYPO
Age: 62y    Gender: Female    POCT Blood Glucose:  159 mg/dL (10-03-23 @ 17:04)  78 mg/dL (10-03-23 @ 11:43)  91 mg/dL (10-03-23 @ 10:02)  73 mg/dL (10-03-23 @ 08:58)  53 mg/dL (10-03-23 @ 08:23)  40 mg/dL (10-03-23 @ 07:50)  36 mg/dL (10-03-23 @ 07:49)  113 mg/dL (10-02-23 @ 21:43)      eMAR:  dextrose 50% Injectable   25 Gram(s) IV Push (10-03-23 @ 08:00)    dextrose Oral Gel   15 Gram(s) Oral (10-03-23 @ 08:41)    insulin glargine Injectable (LANTUS)   10 Unit(s) SubCutaneous (10-02-23 @ 21:50)    insulin lispro (ADMELOG) corrective regimen sliding scale   2 Unit(s) SubCutaneous (10-03-23 @ 17:19)    methylPREDNISolone sodium succinate Injectable   100 milliGRAM(s) IV Push (10-03-23 @ 14:45)    Provider Sajan SANTIAGO notified of FS 40 at 0751 and verbally ordered to administer D50 25 grams IV push  Provider Sajan SANTIAGO notified of FS 53 at 0824 and verbally ordered to administer Glucose Gel  Provider Sajan SANTIAGO notified of FS 73 at 0859 and verbally ordered to not take further action and recheck fingerstick after breakfast  Provider Sajan SANTIAGO notified at 1002 after breakfast for FS 91 and ordered to continue monitoring fingersticks [facility].

## 2023-10-03 NOTE — PROGRESS NOTE ADULT - SUBJECTIVE AND OBJECTIVE BOX
CHIEF COMPLAINT:Patient is a 62y old  Female who presents with a chief complaint of VV ECMO (02 Oct 2023 17:21)      INTERVAL EVENTS:     ROS: Seen by bedside during AM rounds     OBJECTIVE:  ICU Vital Signs Last 24 Hrs  T(C): 37 (03 Oct 2023 06:00), Max: 37.3 (02 Oct 2023 16:00)  T(F): 98.6 (03 Oct 2023 06:00), Max: 99.2 (02 Oct 2023 16:00)  HR: 67 (03 Oct 2023 06:00) (66 - 87)  BP: 102/63 (03 Oct 2023 06:00) (93/66 - 125/79)  BP(mean): 75 (03 Oct 2023 06:00) (67 - 90)  ABP: --  ABP(mean): --  RR: 18 (03 Oct 2023 06:00) (18 - 46)  SpO2: 97% (03 Oct 2023 06:00) (95% - 100%)    O2 Parameters below as of 03 Oct 2023 06:00  Patient On (Oxygen Delivery Method): nasal cannula w/ humidification  O2 Flow (L/min): 6            10-02 @ 07:01  -  10-03 @ 07:00  --------------------------------------------------------  IN: 620 mL / OUT: 900 mL / NET: -280 mL      CAPILLARY BLOOD GLUCOSE      POCT Blood Glucose.: 113 mg/dL (02 Oct 2023 21:43)      PHYSICAL EXAM:  General:   HEENT:   Lymph Nodes:  Neck:   Respiratory:   Cardiovascular:   Abdomen:   Extremities:   Skin:   Neurological:  Psychiatry:        HOSPITAL MEDICATIONS:  MEDICATIONS  (STANDING):  albuterol/ipratropium for Nebulization 3 milliLiter(s) Nebulizer every 6 hours  artificial  tears Solution 1 Drop(s) Both EYES every 12 hours  atovaquone  Suspension 1500 milliGRAM(s) Oral daily  cefTRIAXone   IVPB 1000 milliGRAM(s) IV Intermittent every 24 hours  chlorhexidine 2% Cloths 1 Application(s) Topical <User Schedule>  dextrose 5%. 1000 milliLiter(s) (50 mL/Hr) IV Continuous <Continuous>  dextrose 50% Injectable 25 Gram(s) IV Push once  dextrose 50% Injectable 12.5 Gram(s) IV Push once  dextrose 50% Injectable 25 Gram(s) IV Push once  enoxaparin Injectable 90 milliGRAM(s) SubCutaneous <User Schedule>  folic acid 1 milliGRAM(s) Oral daily  glucagon  Injectable 1 milliGRAM(s) IntraMuscular once  insulin glargine Injectable (LANTUS) 10 Unit(s) SubCutaneous at bedtime  insulin lispro (ADMELOG) corrective regimen sliding scale   SubCutaneous Before meals and at bedtime  metoprolol tartrate 25 milliGRAM(s) Oral two times a day  midodrine 10 milliGRAM(s) Oral every 8 hours  multivitamin/minerals/iron Oral Solution (CENTRUM) 15 milliLiter(s) Oral daily  pantoprazole  Injectable 40 milliGRAM(s) IV Push daily  senna Syrup 10 milliLiter(s) Oral at bedtime    MEDICATIONS  (PRN):  aluminum hydroxide/magnesium hydroxide/simethicone Suspension 30 milliLiter(s) Oral every 4 hours PRN Dyspepsia  dextrose Oral Gel 15 Gram(s) Oral once PRN Blood Glucose LESS THAN 70 milliGRAM(s)/deciliter  sodium chloride 0.65% Nasal 1 Spray(s) Both Nostrils every 8 hours PRN Nasal Congestion  tranexamic acid Injectable for Nebulization 500 milliGRAM(s) Inhalation every 8 hours PRN Nose bleed      LABS:                        9.5    20.51 )-----------( 251      ( 02 Oct 2023 00:40 )             29.8     10-02    138  |  99  |  34<H>  ----------------------------<  161<H>  3.6   |  31  |  0.43<L>    Ca    9.4      02 Oct 2023 00:40  Phos  3.3     10-02  Mg     2.20     10-02    TPro  x   /  Alb  x   /  TBili  8.9<H>  /  DBili  7.6<H>  /  AST  x   /  ALT  x   /  AlkPhos  x   10-02      Urinalysis Basic - ( 02 Oct 2023 00:40 )    Color: x / Appearance: x / SG: x / pH: x  Gluc: 161 mg/dL / Ketone: x  / Bili: x / Urobili: x   Blood: x / Protein: x / Nitrite: x   Leuk Esterase: x / RBC: x / WBC x   Sq Epi: x / Non Sq Epi: x / Bacteria: x        Venous Blood Gas:  10-02 @ 00:40  7.42/55/56/36/85.8  VBG Lactate: 1.1   CHIEF COMPLAINT:Patient is a 62y old  Female who presents with a chief complaint of VV ECMO (02 Oct 2023 17:21)      INTERVAL EVENTS: transferred from MICU overnight. Hypoglycemic this morning now improved s/p treatment. S/P Rituxan dose #2 today.    ROS: See above, remaining ROS neg.     OBJECTIVE:  ICU Vital Signs Last 24 Hrs  T(C): 37 (03 Oct 2023 06:00), Max: 37.3 (02 Oct 2023 16:00)  T(F): 98.6 (03 Oct 2023 06:00), Max: 99.2 (02 Oct 2023 16:00)  HR: 67 (03 Oct 2023 06:00) (66 - 87)  BP: 102/63 (03 Oct 2023 06:00) (93/66 - 125/79)  BP(mean): 75 (03 Oct 2023 06:00) (67 - 90)  ABP: --  ABP(mean): --  RR: 18 (03 Oct 2023 06:00) (18 - 46)  SpO2: 97% (03 Oct 2023 06:00) (95% - 100%)    O2 Parameters below as of 03 Oct 2023 06:00  Patient On (Oxygen Delivery Method): nasal cannula w/ humidification  O2 Flow (L/min): 6            10-02 @ 07:01  -  10-03 @ 07:00  --------------------------------------------------------  IN: 620 mL / OUT: 900 mL / NET: -280 mL      CAPILLARY BLOOD GLUCOSE      POCT Blood Glucose.: 113 mg/dL (02 Oct 2023 21:43)      PHYSICAL EXAM:  General: NAD   Neck: Trachea midline.   Cards: S1/S2, no murmurs   Pulm: CTA bilaterally. No wheezes.   Abdomen: Soft, NTND.   Extremities: No pedal edema. Extremities warm to touch.  Neurology: AOx3. Significant weakness x 4E. Able to shrug shoulders slightly. Able to move hips b/l slightly. SILT x4E.   Skin: warm to touch, color appropriate for ethnicity. Refer to RN assessment for further details.          HOSPITAL MEDICATIONS:  MEDICATIONS  (STANDING):  albuterol/ipratropium for Nebulization 3 milliLiter(s) Nebulizer every 6 hours  artificial  tears Solution 1 Drop(s) Both EYES every 12 hours  atovaquone  Suspension 1500 milliGRAM(s) Oral daily  cefTRIAXone   IVPB 1000 milliGRAM(s) IV Intermittent every 24 hours  chlorhexidine 2% Cloths 1 Application(s) Topical <User Schedule>  dextrose 5%. 1000 milliLiter(s) (50 mL/Hr) IV Continuous <Continuous>  dextrose 50% Injectable 25 Gram(s) IV Push once  dextrose 50% Injectable 12.5 Gram(s) IV Push once  dextrose 50% Injectable 25 Gram(s) IV Push once  enoxaparin Injectable 90 milliGRAM(s) SubCutaneous <User Schedule>  folic acid 1 milliGRAM(s) Oral daily  glucagon  Injectable 1 milliGRAM(s) IntraMuscular once  insulin glargine Injectable (LANTUS) 10 Unit(s) SubCutaneous at bedtime  insulin lispro (ADMELOG) corrective regimen sliding scale   SubCutaneous Before meals and at bedtime  metoprolol tartrate 25 milliGRAM(s) Oral two times a day  midodrine 10 milliGRAM(s) Oral every 8 hours  multivitamin/minerals/iron Oral Solution (CENTRUM) 15 milliLiter(s) Oral daily  pantoprazole  Injectable 40 milliGRAM(s) IV Push daily  senna Syrup 10 milliLiter(s) Oral at bedtime    MEDICATIONS  (PRN):  aluminum hydroxide/magnesium hydroxide/simethicone Suspension 30 milliLiter(s) Oral every 4 hours PRN Dyspepsia  dextrose Oral Gel 15 Gram(s) Oral once PRN Blood Glucose LESS THAN 70 milliGRAM(s)/deciliter  sodium chloride 0.65% Nasal 1 Spray(s) Both Nostrils every 8 hours PRN Nasal Congestion  tranexamic acid Injectable for Nebulization 500 milliGRAM(s) Inhalation every 8 hours PRN Nose bleed      LABS:                        9.5    20.51 )-----------( 251      ( 02 Oct 2023 00:40 )             29.8     10-02    138  |  99  |  34<H>  ----------------------------<  161<H>  3.6   |  31  |  0.43<L>    Ca    9.4      02 Oct 2023 00:40  Phos  3.3     10-02  Mg     2.20     10-02    TPro  x   /  Alb  x   /  TBili  8.9<H>  /  DBili  7.6<H>  /  AST  x   /  ALT  x   /  AlkPhos  x   10-02      Urinalysis Basic - ( 02 Oct 2023 00:40 )    Color: x / Appearance: x / SG: x / pH: x  Gluc: 161 mg/dL / Ketone: x  / Bili: x / Urobili: x   Blood: x / Protein: x / Nitrite: x   Leuk Esterase: x / RBC: x / WBC x   Sq Epi: x / Non Sq Epi: x / Bacteria: x        Venous Blood Gas:  10-02 @ 00:40  7.42/55/56/36/85.8  VBG Lactate: 1.1

## 2023-10-03 NOTE — PROGRESS NOTE ADULT - ASSESSMENT
ASSESSMENT & PLAN:   61 yo F w/ Afib initially presented to urgent care for SOB where she had an XR done concerning for b/l lower infiltrates, sent to [facility] ED and admitted to  on 8/26 after being found to be hypoxemic, reported having  debilitating b/l hand numbness/tingling and some muscles weakness several months. She was found to have interstitial lung disease appearance on CT, with the plan for further ILD workup and management, started on prednisone on admission with gradually improving O2 requirements and stable on 2-3LNC. She underwent bronchoscopy with TBBX on 8/30 which showed Non-Specific Intersitial Disease (NSIP)/OP. Labs notable for positive ARIANA 1:1280, RNP > 8, Alejandro > 8. Patient continued on steroids and received cellcept, which was discontinued 2/2 Afib-RVR. Interval CTA chest on 9/11 also negative PE did show possible lingula pneumonia. Started on empiric vancomycin and zosyn 9/12, course was then c/b episode of SVT to 170s w/ rapidly progressive hypoxemic respiratory failure, placed NRB offered HFNC/BiPAP but refused, leading to worsening hypoxemic respiratory failure requiring intubation on 9/13. Despite best practices, patient remained hypoxemic and patient was cannulated for VV-ECMO on 9/13 and transferred to [facility] for further management. 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 Found to have elevated LFT's/Bili rising likely shock liver and shock state, now improving. Showed significant lung improvement and was decannulated 9/21. Extubated to HFNC 9/22. Currently tolerating 5L NC.    NEUROLOGY  Home meds: gabapentin 100g TID; holding for now  AA&Ox3 at baseline   - following commands w/ delirium likely iso prolonged hospitalization, sedation, and ICU setting. Now improved; following commands and understands conversation.  - required precedex for anxiety, stopped 9/22  - seroquel 25mg QHS for anti +delirium d/c'd,  may have caused lethargy and hypotension, , currently does not require any meds  - CT 9/14 no acute findings  - C/w aggressive PT/OT - Oob to chair twice daily, currently with kandi lift,  increase activity as tolerated  - Social work - [facility]?   - PM&R consult     ENT  #Epistaxis   Small amount of intermittent bleeding noted in b/l nares and hemoptysis?  - Will continue with afrin and nebulized TXA prn; once able to tolerate food, we will remove the NG tube  - humidified air provided via face tent  - Nasal saline spray 1 spray each nose PRN   - Afrin 1 spray for rebleeding followed by direct pressure on the nose, if still actively bleeding, call ENT for reassessment  - monitor hgb and for signs of overt bleeding closely       PULMONARY  Admitted to [facility] on 8/26 after p/w SOB, found to be hypoxemic, required 2-4L NC/bibap, initially responded well to IV steroids. 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 2/2 bacterial PNA vs atypical PNA vs aspiration vs AEILD continued to show significant improvement from a lung perspective and was decannulated 9/21, and extubated to HFNC 9/22, now tolerating 50% face tent.  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   - 9/20 BAL culture: normal respiratory selvin  - continue duonebs  - s/p decannulation on 9/21 with plan for suture removal ~7-10 days   - extubated 9/22 to HFNC   - weaned from HFNC to 6L nasal cannula, given epistaxis currently on face tent. Will continue with face tent until epistaxis fully resolves.  - CT 9/30 w/reticular peripheral lung opacities, LL atelectasis, and bilateral GGO's, improved compared to prior image (9/14) findings that showed extensive lung consolidations    CARDIOVASCULAR  Hx of Afib w at OSH, started on Amiodarone gtt now in shock state requiring requiring pressors likely septic with component of vaspoplegia i/s/o sedation, possible contribution of cardiogenic from RV dysfunction. Had episodes of SVT (9/25) responsive to lopressor   - No PE seen on invasive pulmonary angiography at time of ECMO cannulation or in recent imaging  - NO2 weaned off  (9/15) RV function remains unchanged  - s/p milrinone, off since (9/13)  - converted to sinus (9/14) discontinued amio gtt iso bradycardia, back to AF with RVR on 9/19 when sedation weaned off  - continue 25mg metoprolol BID for rate control   - Phenylephrine off since (9/25),  - continue midodrine to assist with blood pressure, dose lowered from 20mg to 10mg q8 hours iso bradycardia; will titrate down as tolerated  - RITCHIE 9/14 : No MEREDITH thrombus noted, negative for PFO. LVOT diameter of 2 cm  - TTE 9/12 with EF 65-70% with normal LV and RV  - TTE 9/14 with  EF 18%. Severe global LV systolic dysfunction. RV enlargement with decreased RV systolic function, however RITCHIE and recent POCUS shows normal LV systolic function, improved RV systolic function   - TTE 9/19 with recovered EF to 67% but still with RV enlargement and systolic dysfunction  - Repeat TTE pending       GASTROINTESTINAL  Transaminase elevation likely 2/2 combination of shock liver, malposition of ECMO cannula and liver dysfunction  - ALK/ AST/ALT downtrending but remain elevated 624/105/177, T.bili peaked 19.3 mostly direct, and now 9.4  - Acute hepatitis panel negative  - RUQ US 9/15 shows fatty infiltration of liver, negative for hepatobiliary pathology, repeat RUQ US performed 9/22 for worsening LFT's and rising bili with findings negative as well  - Hepatology consulted - likely shock liver with expected delay in improvement in bilirubin  - Hypertriglyceridemia 836, hx of fatty liver and propofol gtt, s/p insulin gtt, now improving  - Given previous watery bowel movements, will continue with senna only at this time. Last BM 10/01   - failed dysphagia screen x2, received continuous TF via KFT  - cineesophagogram today, will start recommended diet minced/moist and thin liquids, keep KFT for now, remove if patient meets nutritional requirements with oral intake  - CT abdomen 9/15 w/o bowel obstruction, noted with colonic diverticulosis mostly in sigmoid, w/o evidence of diverticulitis  - CT abdomen 9/30 w/ sigmoid diverticulosis, without evidence of acute diverticulitis, w/distended gallbladder w/sludge   - HIDA scan 10/01 obtained iso recent imaging, leukocytosis and abdominal discomfort, however no evidence found of acute cholecystitis or biliary obstruction       RENAL  sCr baseline 0.78  - Creatinine wnl  - I/O's:  negative -8.4L/LOS and negative -1040mL/24 hours  - s/p diuresis with lasix, now given PRN, goal to keep net even to slightly negative  - good UO with primafit  - hypernatremia improved, free H2O discontinued 9/21      INFECTIOUS DISEASE  Leukocytosis  - Afebrile, however WBC continues to uptrend, likely iso filgrastim (initiated 9/17-9/21 for leukopenia) vs infection?    - blood cultures negative to date, sputum and right groin (s/p ecmo cannula site) - send wound culture if drainage present  - UA+ with many bacteria, however urine culture negative, s/p CTX 1gm IVPB daily (9/27-10/1 ) and empiric Vanco (9/29-10/1)   - CT imaging CAP 9/30 and HIDA scan 10/01 obtained iso leukocytosis,  findings with no evidence of infection/abscess  - previous cultures, BAL, cytopathology so far negative  - leukopenia now resolved s/p filgrastim (9/17-9/21)  - vanco restarted for ppx on 9/18 d/t leukopenia but stopped on 9/20  - Bactrim DS discontinued iso leukopenia, continue Mepron for PJP prophylaxis instead  - s/p IV Ceftriaxone 5 days? at [facility] out of an abundance of precaution to cover BAL specimen  - s/p zosyn at OSH (9/12) for lingula PNA  - s/p vancomycin (9/12-9/15 ) and azithro (9/14-9/15)   - s/p empiric donald (9/13-9/20) now that ANC >1.5  - positive COVID-19 antigen in late August, recent RVP negative 9/30  - ID following- f/u recs      ENDOCRINE  # Hyperglycemia i/s/o steroids  Admission A1c 6.1  - s/p insulin gtt, transitioned to NPH 6u q6 with mod ISS while receiving TF, will change to Lantus 10 units QHS with mod ISS for now  - continue to adjust insulin and ISS as steroids are tapered and diet changes  - elevated triglycerides 835, has hx of fatty liver, had been on propofol gtt. TG downtrending since initiating insulin gtt for hyperglycemia, now normalizing off propofol  - TSH low initially at 0.13 repeat normal     HEME  #Right IJ thrombus   - s/p argatroban gtt , transitioned to lovenox   - Lowered lovenox dose [id number] BID to 100mg BID based on elevated Anti Xa level (1.14), level redrawn (1.08) now lowered to 90mg iso epistaxis, next Anti Xa level due 10/4 at 1300  - INR elevated likely iso argatroban and liver dysfxn, s/p Vit. K (9/14) and FFP x1 (9/15) - now resolved  - platelets x 7 for thrombocytopenia <50,000, last transfused  9/21 -now improved  - Intermittent episodes of epistaxis, no overt bleeding noted.   - Hgb slight downtrend but remains stable, likely anemia in critical illness and recent episodes of epistaxis    #Leukopenia  #Thrombocytopenia  - Thrombocytopenia refractory after intermittent transfusions while on circuit  - Heme consult placed for thrombocytopenia, noted to also be leukopenic but now resolved   - unlikely HLH/MAS since many abnormalities can be explained by severe hypoxemia/hypotension during initial decompensation prior to ecmo cannulation but some features that are consistent and with rheumatic disease it is a possibility to f/u hematology regarding utility of further lab/pathologic testing  - peripheral blood smear reviewed: large platelets noted, no platelet clumps, no blast cells noted, neutrophils noted w/ normal number of lobes, nucleated RBC noted  - acute hepatitis panel negative  - s/p filgrastim 480 daily  given ANC >1500 in the setting of possible infxn       #R/O DVT  - LE duplex negative for DVT   - first VA duplex post decannulation 9/21 shows non-occlusive deep vein thrombosis in the right jugular vein and the right cephalic is non-compressible  - continue lovenox for now       MSK  Onset of debilitating b/l hand cramps and some muscles weakness x several months, unclear etiology, radiculopathy? vs myositis?   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   - symptoms improved with cellcept (9/8-9/11) but discontinued given Afib RVR   - f/u with Dr. Nik Marie or Dante Urbano for EMG upon discharge (osh?)      RHEUM  - started on Solumedrol 60mg q6h from 9/1 to 9/6 then weaned over time to then Medrol 32 mg 9/9 to 9/12 at [facility]  - s/p Cellcept 9/8 to 9/11 but stopped due to Afib- RVR/tachycardia   - CT findings, Improved/decreased extent of bilateral ground glass opacities and reticular markings compared to the previous study. Findings are nonspecific but differential etiologies include interstitial pneumonia, drug toxicity, nonspecific connective tissue disorders.  - OSH labs show strongly positive ARIANA, RNP, and anti smith antibodies with low C4 and normal C3. 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  - s/p rituximab 9/16/23  - plan for second rituximab dose was for ~9/30/23 but will hold off for now given patient currently receiving antibiotics   - S/P Rituxan dose #2 (10/3)      SKIN  Reportedly had single episode of painful rash on her shins, ears and neck and chest which resolved with a steroid cream from a dermatologist prior to admission  - no evidence of rash currently  - right groin with breakdown s/p cannula placement may be possible source of infection - wound culture not collected, no drainage noted  - as per wound care,  area does not appear infected       PROPHYLAXIS  # DVT: Lovenox  # GI: TF      LINES  -Ecmo Cannulas 9/13-9/21  -[facility]J TLC- 9/13-9/21  -Left Ax [place] - 9/13-9/27 (placed at OSH)  -RA 16g PIV x2- 9/15

## 2023-10-03 NOTE — CONSULT NOTE ADULT - PROVIDER SPECIALTY LIST ADULT
Rheumatology
ENT
Infectious Disease
Rehab Medicine
Transfusion Medicine
Hepatology
Transplant Pulmonology
Heme/Onc
Wound Care
Palliative Care

## 2023-10-03 NOTE — CONSULT NOTE ADULT - CONSULT REQUESTED BY NAME
Michi
Wali Prabhaakr DO
IM
rheum
Jozef Borden
Primary team
Dr. Borden
MICU team

## 2023-10-03 NOTE — CONSULT NOTE ADULT - CONSULT REQUESTED DATE/TIME
18-Sep-2023 19:47
03-Oct-2023 13:21
29-Sep-2023 13:09
15-Sep-2023 08:00
14-Sep-2023 10:33
16-Sep-2023 12:09
14-Sep-2023 16:00
16-Sep-2023 17:59
18-Sep-2023 14:31
27-Sep-2023 11:54

## 2023-10-03 NOTE — CONSULT NOTE ADULT - CONSULT REASON
Concern for antimicrobial ppx
Rehabilitation consult
elevated liver enzymes
leukopenia
transplant eval
Concern for wound within right groin previous ECMO cannulation site
PLEX
SLE-DAH
Epistaxis
Complex decision making in the setting of advanced illness.

## 2023-10-04 LAB
ALBUMIN SERPL ELPH-MCNC: 3.2 G/DL — LOW (ref 3.3–5)
ALP SERPL-CCNC: 535 U/L — HIGH (ref 40–120)
ALT FLD-CCNC: 197 U/L — HIGH (ref 4–33)
ANION GAP SERPL CALC-SCNC: 12 MMOL/L — SIGNIFICANT CHANGE UP (ref 7–14)
ANISOCYTOSIS BLD QL: SLIGHT — SIGNIFICANT CHANGE UP
AST SERPL-CCNC: 105 U/L — HIGH (ref 4–32)
BASOPHILS # BLD AUTO: 0 K/UL — SIGNIFICANT CHANGE UP (ref 0–0.2)
BASOPHILS NFR BLD AUTO: 0 % — SIGNIFICANT CHANGE UP (ref 0–2)
BILIRUB SERPL-MCNC: 8.8 MG/DL — HIGH (ref 0.2–1.2)
BUN SERPL-MCNC: 28 MG/DL — HIGH (ref 7–23)
CALCIUM SERPL-MCNC: 9.7 MG/DL — SIGNIFICANT CHANGE UP (ref 8.4–10.5)
CHLORIDE SERPL-SCNC: 98 MMOL/L — SIGNIFICANT CHANGE UP (ref 98–107)
CO2 SERPL-SCNC: 27 MMOL/L — SIGNIFICANT CHANGE UP (ref 22–31)
CREAT SERPL-MCNC: 0.38 MG/DL — LOW (ref 0.5–1.3)
EGFR: 112 ML/MIN/1.73M2 — SIGNIFICANT CHANGE UP
EOSINOPHIL # BLD AUTO: 0 K/UL — SIGNIFICANT CHANGE UP (ref 0–0.5)
EOSINOPHIL NFR BLD AUTO: 0 % — SIGNIFICANT CHANGE UP (ref 0–6)
GIANT PLATELETS BLD QL SMEAR: PRESENT — SIGNIFICANT CHANGE UP
GLUCOSE BLDC GLUCOMTR-MCNC: 110 MG/DL — HIGH (ref 70–99)
GLUCOSE BLDC GLUCOMTR-MCNC: 123 MG/DL — HIGH (ref 70–99)
GLUCOSE BLDC GLUCOMTR-MCNC: 221 MG/DL — HIGH (ref 70–99)
GLUCOSE BLDC GLUCOMTR-MCNC: 229 MG/DL — HIGH (ref 70–99)
GLUCOSE SERPL-MCNC: 138 MG/DL — HIGH (ref 70–99)
HCT VFR BLD CALC: 34.5 % — SIGNIFICANT CHANGE UP (ref 34.5–45)
HGB BLD-MCNC: 10.4 G/DL — LOW (ref 11.5–15.5)
IANC: 10.94 K/UL — HIGH (ref 1.8–7.4)
LMWH PPP CHRO-ACNC: 1.02 IU/ML — SIGNIFICANT CHANGE UP (ref 0.5–1.1)
LMWH PPP CHRO-ACNC: >2 IU/ML — HIGH (ref 0.5–1.1)
LYMPHOCYTES # BLD AUTO: 0.22 K/UL — LOW (ref 1–3.3)
LYMPHOCYTES # BLD AUTO: 1.8 % — LOW (ref 13–44)
MACROCYTES BLD QL: SLIGHT — SIGNIFICANT CHANGE UP
MAGNESIUM SERPL-MCNC: 2.4 MG/DL — SIGNIFICANT CHANGE UP (ref 1.6–2.6)
MANUAL SMEAR VERIFICATION: SIGNIFICANT CHANGE UP
MCHC RBC-ENTMCNC: 30.1 GM/DL — LOW (ref 32–36)
MCHC RBC-ENTMCNC: 32 PG — SIGNIFICANT CHANGE UP (ref 27–34)
MCV RBC AUTO: 106.2 FL — HIGH (ref 80–100)
METAMYELOCYTES # FLD: 0.9 % — SIGNIFICANT CHANGE UP (ref 0–1)
MONOCYTES # BLD AUTO: 0.56 K/UL — SIGNIFICANT CHANGE UP (ref 0–0.9)
MONOCYTES NFR BLD AUTO: 4.6 % — SIGNIFICANT CHANGE UP (ref 2–14)
MYELOCYTES NFR BLD: 0.9 % — HIGH (ref 0–0)
NEUTROPHILS # BLD AUTO: 11.17 K/UL — HIGH (ref 1.8–7.4)
NEUTROPHILS NFR BLD AUTO: 89.1 % — HIGH (ref 43–77)
NEUTS BAND # BLD: 2.7 % — SIGNIFICANT CHANGE UP (ref 0–6)
PHOSPHATE SERPL-MCNC: 3.2 MG/DL — SIGNIFICANT CHANGE UP (ref 2.5–4.5)
PLAT MORPH BLD: ABNORMAL
PLATELET # BLD AUTO: 261 K/UL — SIGNIFICANT CHANGE UP (ref 150–400)
PLATELET COUNT - ESTIMATE: NORMAL — SIGNIFICANT CHANGE UP
POTASSIUM SERPL-MCNC: 4.5 MMOL/L — SIGNIFICANT CHANGE UP (ref 3.5–5.3)
POTASSIUM SERPL-SCNC: 4.5 MMOL/L — SIGNIFICANT CHANGE UP (ref 3.5–5.3)
PROT SERPL-MCNC: 5.5 G/DL — LOW (ref 6–8.3)
RBC # BLD: 3.25 M/UL — LOW (ref 3.8–5.2)
RBC # FLD: 24.5 % — HIGH (ref 10.3–14.5)
RBC BLD AUTO: ABNORMAL
SODIUM SERPL-SCNC: 137 MMOL/L — SIGNIFICANT CHANGE UP (ref 135–145)
WBC # BLD: 12.17 K/UL — HIGH (ref 3.8–10.5)
WBC # FLD AUTO: 12.17 K/UL — HIGH (ref 3.8–10.5)

## 2023-10-04 PROCEDURE — 93306 TTE W/DOPPLER COMPLETE: CPT | Mod: 26

## 2023-10-04 PROCEDURE — 99233 SBSQ HOSP IP/OBS HIGH 50: CPT

## 2023-10-04 RX ORDER — ACETAMINOPHEN 500 MG
975 TABLET ORAL ONCE
Refills: 0 | Status: COMPLETED | OUTPATIENT
Start: 2023-10-04 | End: 2023-10-04

## 2023-10-04 RX ORDER — ENOXAPARIN SODIUM 100 MG/ML
90 INJECTION SUBCUTANEOUS
Refills: 0 | Status: DISCONTINUED | OUTPATIENT
Start: 2023-10-04 | End: 2023-10-04

## 2023-10-04 RX ORDER — LANOLIN ALCOHOL/MO/W.PET/CERES
6 CREAM (GRAM) TOPICAL AT BEDTIME
Refills: 0 | Status: DISCONTINUED | OUTPATIENT
Start: 2023-10-04 | End: 2023-10-05

## 2023-10-04 RX ORDER — ENOXAPARIN SODIUM 100 MG/ML
70 INJECTION SUBCUTANEOUS
Refills: 0 | Status: DISCONTINUED | OUTPATIENT
Start: 2023-10-04 | End: 2023-10-05

## 2023-10-04 RX ORDER — LIDOCAINE 4 G/100G
1 CREAM TOPICAL ONCE
Refills: 0 | Status: COMPLETED | OUTPATIENT
Start: 2023-10-04 | End: 2023-10-04

## 2023-10-04 RX ADMIN — LIDOCAINE 1 PATCH: 4 CREAM TOPICAL at 19:00

## 2023-10-04 RX ADMIN — ENOXAPARIN SODIUM 70 MILLIGRAM(S): 100 INJECTION SUBCUTANEOUS at 21:11

## 2023-10-04 RX ADMIN — Medication 1 DROP(S): at 17:32

## 2023-10-04 RX ADMIN — Medication 25 MILLIGRAM(S): at 17:31

## 2023-10-04 RX ADMIN — Medication 3 MILLILITER(S): at 21:56

## 2023-10-04 RX ADMIN — ENOXAPARIN SODIUM 90 MILLIGRAM(S): 100 INJECTION SUBCUTANEOUS at 09:18

## 2023-10-04 RX ADMIN — Medication 6 MILLIGRAM(S): at 21:06

## 2023-10-04 RX ADMIN — Medication 4: at 17:32

## 2023-10-04 RX ADMIN — Medication 25 MILLIGRAM(S): at 06:19

## 2023-10-04 RX ADMIN — Medication 4: at 12:12

## 2023-10-04 RX ADMIN — MIDODRINE HYDROCHLORIDE 10 MILLIGRAM(S): 2.5 TABLET ORAL at 13:22

## 2023-10-04 RX ADMIN — Medication 975 MILLIGRAM(S): at 07:41

## 2023-10-04 RX ADMIN — MIDODRINE HYDROCHLORIDE 10 MILLIGRAM(S): 2.5 TABLET ORAL at 06:19

## 2023-10-04 RX ADMIN — Medication 3 MILLILITER(S): at 17:52

## 2023-10-04 RX ADMIN — Medication 3 MILLILITER(S): at 12:15

## 2023-10-04 RX ADMIN — LIDOCAINE 1 PATCH: 4 CREAM TOPICAL at 07:41

## 2023-10-04 RX ADMIN — CHLORHEXIDINE GLUCONATE 1 APPLICATION(S): 213 SOLUTION TOPICAL at 06:18

## 2023-10-04 RX ADMIN — MIDODRINE HYDROCHLORIDE 10 MILLIGRAM(S): 2.5 TABLET ORAL at 21:11

## 2023-10-04 RX ADMIN — Medication 1 MILLIGRAM(S): at 12:12

## 2023-10-04 RX ADMIN — Medication 80 MILLIGRAM(S): at 06:18

## 2023-10-04 RX ADMIN — Medication 3 MILLILITER(S): at 03:52

## 2023-10-04 RX ADMIN — Medication 975 MILLIGRAM(S): at 08:10

## 2023-10-04 RX ADMIN — ATOVAQUONE 1500 MILLIGRAM(S): 750 SUSPENSION ORAL at 12:11

## 2023-10-04 RX ADMIN — Medication 15 MILLILITER(S): at 12:11

## 2023-10-04 RX ADMIN — PANTOPRAZOLE SODIUM 40 MILLIGRAM(S): 20 TABLET, DELAYED RELEASE ORAL at 12:11

## 2023-10-04 RX ADMIN — Medication 1 DROP(S): at 06:19

## 2023-10-04 RX ADMIN — SENNA PLUS 10 MILLILITER(S): 8.6 TABLET ORAL at 21:07

## 2023-10-04 NOTE — PROGRESS NOTE ADULT - SUBJECTIVE AND OBJECTIVE BOX
CHIEF COMPLAINT:Patient is a 62y old  Female who presents with a chief complaint of VV ECMO (03 Oct 2023 13:21)      INTERVAL EVENTS:     ROS: Seen by bedside during AM rounds     OBJECTIVE:  ICU Vital Signs Last 24 Hrs  T(C): 35.9 (04 Oct 2023 03:01), Max: 37.2 (03 Oct 2023 12:35)  T(F): 96.7 (04 Oct 2023 03:01), Max: 98.9 (03 Oct 2023 12:35)  HR: 81 (04 Oct 2023 03:52) (65 - 91)  BP: 122/65 (04 Oct 2023 00:11) (108/58 - 124/70)  BP(mean): --  ABP: --  ABP(mean): --  RR: 19 (04 Oct 2023 00:11) (16 - 20)  SpO2: 100% (04 Oct 2023 03:52) (94% - 100%)    O2 Parameters below as of 04 Oct 2023 00:11  Patient On (Oxygen Delivery Method): nasal cannula w/ humidification  O2 Flow (L/min): 4            10-03 @ 07:01  -  10-04 @ 07:00  --------------------------------------------------------  IN: 600 mL / OUT: 1550 mL / NET: -950 mL      CAPILLARY BLOOD GLUCOSE      POCT Blood Glucose.: 123 mg/dL (04 Oct 2023 07:49)      PHYSICAL EXAM:  General:   HEENT:   Lymph Nodes:  Neck:   Respiratory:   Cardiovascular:   Abdomen:   Extremities:   Skin:   Neurological:  Psychiatry:        HOSPITAL MEDICATIONS:  MEDICATIONS  (STANDING):  albuterol/ipratropium for Nebulization 3 milliLiter(s) Nebulizer every 6 hours  artificial  tears Solution 1 Drop(s) Both EYES every 12 hours  atovaquone  Suspension 1500 milliGRAM(s) Oral daily  chlorhexidine 2% Cloths 1 Application(s) Topical <User Schedule>  dextrose 5%. 1000 milliLiter(s) (50 mL/Hr) IV Continuous <Continuous>  dextrose 50% Injectable 12.5 Gram(s) IV Push once  dextrose 50% Injectable 12.5 Gram(s) IV Push once  dextrose 50% Injectable 25 Gram(s) IV Push once  dextrose 50% Injectable 25 Gram(s) IV Push once  enoxaparin Injectable 90 milliGRAM(s) SubCutaneous <User Schedule>  folic acid 1 milliGRAM(s) Oral daily  glucagon  Injectable 1 milliGRAM(s) IntraMuscular once  insulin lispro (ADMELOG) corrective regimen sliding scale   SubCutaneous Before meals and at bedtime  methylPREDNISolone sodium succinate Injectable 80 milliGRAM(s) IV Push daily  metoprolol tartrate 25 milliGRAM(s) Oral two times a day  midodrine 10 milliGRAM(s) Oral every 8 hours  multivitamin/minerals/iron Oral Solution (CENTRUM) 15 milliLiter(s) Oral daily  pantoprazole  Injectable 40 milliGRAM(s) IV Push daily  senna Syrup 10 milliLiter(s) Oral at bedtime  sodium chloride 0.9%. 1000 milliLiter(s) (30 mL/Hr) IV Continuous <Continuous>    MEDICATIONS  (PRN):  aluminum hydroxide/magnesium hydroxide/simethicone Suspension 30 milliLiter(s) Oral every 4 hours PRN Dyspepsia  dextrose Oral Gel 15 Gram(s) Oral once PRN Blood Glucose LESS THAN 70 milliGRAM(s)/deciliter  diphenhydrAMINE Injectable 50 milliGRAM(s) IV Push once PRN REACTION  meperidine     Injectable 25 milliGRAM(s) IV Push once PRN REACTION  sodium chloride 0.65% Nasal 1 Spray(s) Both Nostrils every 8 hours PRN Nasal Congestion  tranexamic acid Injectable for Nebulization 500 milliGRAM(s) Inhalation every 8 hours PRN Nose bleed      LABS:                        10.4   12.17 )-----------( 261      ( 04 Oct 2023 05:43 )             34.5     10-04    137  |  98  |  28<H>  ----------------------------<  138<H>  4.5   |  27  |  0.38<L>    Ca    9.7      04 Oct 2023 05:43  Phos  3.2     10-04  Mg     2.40     10-04    TPro  5.5<L>  /  Alb  3.2<L>  /  TBili  8.8<H>  /  DBili  x   /  AST  105<H>  /  ALT  197<H>  /  AlkPhos  535<H>  10-04      Urinalysis Basic - ( 04 Oct 2023 05:43 )    Color: x / Appearance: x / SG: x / pH: x  Gluc: 138 mg/dL / Ketone: x  / Bili: x / Urobili: x   Blood: x / Protein: x / Nitrite: x   Leuk Esterase: x / RBC: x / WBC x   Sq Epi: x / Non Sq Epi: x / Bacteria: x         CHIEF COMPLAINT:Patient is a 62y old  Female who presents with a chief complaint of VV ECMO (03 Oct 2023 13:21)      INTERVAL EVENTS: no overnight events noted. C/o abd wall pain today at site of prior LVX injections improved wih tylenol and lidoderm patch. Anti-Factor Xa level elevated, LVX reduced.     ROS: See above, remaining RO Sneg.     OBJECTIVE:  ICU Vital Signs Last 24 Hrs  T(C): 35.9 (04 Oct 2023 03:01), Max: 37.2 (03 Oct 2023 12:35)  T(F): 96.7 (04 Oct 2023 03:01), Max: 98.9 (03 Oct 2023 12:35)  HR: 81 (04 Oct 2023 03:52) (65 - 91)  BP: 122/65 (04 Oct 2023 00:11) (108/58 - 124/70)  BP(mean): --  ABP: --  ABP(mean): --  RR: 19 (04 Oct 2023 00:11) (16 - 20)  SpO2: 100% (04 Oct 2023 03:52) (94% - 100%)    O2 Parameters below as of 04 Oct 2023 00:11  Patient On (Oxygen Delivery Method): nasal cannula w/ humidification  O2 Flow (L/min): 4            10-03 @ 07:01  -  10-04 @ 07:00  --------------------------------------------------------  IN: 600 mL / OUT: 1550 mL / NET: -950 mL      CAPILLARY BLOOD GLUCOSE      POCT Blood Glucose.: 123 mg/dL (04 Oct 2023 07:49)      PHYSICAL EXAM:  General: NAD   Neck: Trachea midline.   Cards: S1/S2, no murmurs   Pulm: CTA bilaterally. No wheezes.   Abdomen: Soft, NTND.   Extremities: No pedal edema. Extremities warm to touch.  Neurology: AOx3. Significant weakness x 4E. Able to shrug shoulders slightly. Able to move hips b/l slightly. SILT x4E.   Skin: warm to touch, color appropriate for ethnicity. Refer to RN assessment for further details.      HOSPITAL MEDICATIONS:  MEDICATIONS  (STANDING):  albuterol/ipratropium for Nebulization 3 milliLiter(s) Nebulizer every 6 hours  artificial  tears Solution 1 Drop(s) Both EYES every 12 hours  atovaquone  Suspension 1500 milliGRAM(s) Oral daily  chlorhexidine 2% Cloths 1 Application(s) Topical <User Schedule>  dextrose 5%. 1000 milliLiter(s) (50 mL/Hr) IV Continuous <Continuous>  dextrose 50% Injectable 12.5 Gram(s) IV Push once  dextrose 50% Injectable 12.5 Gram(s) IV Push once  dextrose 50% Injectable 25 Gram(s) IV Push once  dextrose 50% Injectable 25 Gram(s) IV Push once  enoxaparin Injectable 90 milliGRAM(s) SubCutaneous <User Schedule>  folic acid 1 milliGRAM(s) Oral daily  glucagon  Injectable 1 milliGRAM(s) IntraMuscular once  insulin lispro (ADMELOG) corrective regimen sliding scale   SubCutaneous Before meals and at bedtime  methylPREDNISolone sodium succinate Injectable 80 milliGRAM(s) IV Push daily  metoprolol tartrate 25 milliGRAM(s) Oral two times a day  midodrine 10 milliGRAM(s) Oral every 8 hours  multivitamin/minerals/iron Oral Solution (CENTRUM) 15 milliLiter(s) Oral daily  pantoprazole  Injectable 40 milliGRAM(s) IV Push daily  senna Syrup 10 milliLiter(s) Oral at bedtime  sodium chloride 0.9%. 1000 milliLiter(s) (30 mL/Hr) IV Continuous <Continuous>    MEDICATIONS  (PRN):  aluminum hydroxide/magnesium hydroxide/simethicone Suspension 30 milliLiter(s) Oral every 4 hours PRN Dyspepsia  dextrose Oral Gel 15 Gram(s) Oral once PRN Blood Glucose LESS THAN 70 milliGRAM(s)/deciliter  diphenhydrAMINE Injectable 50 milliGRAM(s) IV Push once PRN REACTION  meperidine     Injectable 25 milliGRAM(s) IV Push once PRN REACTION  sodium chloride 0.65% Nasal 1 Spray(s) Both Nostrils every 8 hours PRN Nasal Congestion  tranexamic acid Injectable for Nebulization 500 milliGRAM(s) Inhalation every 8 hours PRN Nose bleed      LABS:                        10.4   12.17 )-----------( 261      ( 04 Oct 2023 05:43 )             34.5     10-04    137  |  98  |  28<H>  ----------------------------<  138<H>  4.5   |  27  |  0.38<L>    Ca    9.7      04 Oct 2023 05:43  Phos  3.2     10-04  Mg     2.40     10-04    TPro  5.5<L>  /  Alb  3.2<L>  /  TBili  8.8<H>  /  DBili  x   /  AST  105<H>  /  ALT  197<H>  /  AlkPhos  535<H>  10-04      Urinalysis Basic - ( 04 Oct 2023 05:43 )    Color: x / Appearance: x / SG: x / pH: x  Gluc: 138 mg/dL / Ketone: x  / Bili: x / Urobili: x   Blood: x / Protein: x / Nitrite: x   Leuk Esterase: x / RBC: x / WBC x   Sq Epi: x / Non Sq Epi: x / Bacteria: x

## 2023-10-04 NOTE — PROGRESS NOTE ADULT - ASSESSMENT
ASSESSMENT & PLAN:   61 yo F w/ Afib initially presented to urgent care for SOB where she had an XR done concerning for b/l lower infiltrates, sent to [facility] ED and admitted to  on 8/26 after being found to be hypoxemic, reported having  debilitating b/l hand numbness/tingling and some muscles weakness several months. She was found to have interstitial lung disease appearance on CT, with the plan for further ILD workup and management, started on prednisone on admission with gradually improving O2 requirements and stable on 2-3LNC. She underwent bronchoscopy with TBBX on 8/30 which showed Non-Specific Intersitial Disease (NSIP)/OP. Labs notable for positive ARIANA 1:1280, RNP > 8, Alejandro > 8. Patient continued on steroids and received cellcept, which was discontinued 2/2 Afib-RVR. Interval CTA chest on 9/11 also negative PE did show possible lingula pneumonia. Started on empiric vancomycin and zosyn 9/12, course was then c/b episode of SVT to 170s w/ rapidly progressive hypoxemic respiratory failure, placed NRB offered HFNC/BiPAP but refused, leading to worsening hypoxemic respiratory failure requiring intubation on 9/13. Despite best practices, patient remained hypoxemic and patient was cannulated for VV-ECMO on 9/13 and transferred to [facility] for further management. 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 Found to have elevated LFT's/Bili rising likely shock liver and shock state, now improving. Showed significant lung improvement and was decannulated 9/21. Extubated to HFNC 9/22. Currently tolerating 5L NC.    NEUROLOGY  Home meds: gabapentin 100g TID; holding for now  AA&Ox3 at baseline   - following commands w/ delirium likely iso prolonged hospitalization, sedation, and ICU setting. Now improved; following commands and understands conversation.  - required precedex for anxiety, stopped 9/22  - seroquel 25mg QHS for anti +delirium d/c'd,  may have caused lethargy and hypotension, , currently does not require any meds  - CT 9/14 no acute findings  - C/w aggressive PT/OT - Oob to chair twice daily, currently with kandi lift,  increase activity as tolerated  - Social work - [facility]?   - PM&R consult     ENT  #Epistaxis   Small amount of intermittent bleeding noted in b/l nares and hemoptysis?  - Will continue with afrin and nebulized TXA prn; once able to tolerate food, we will remove the NG tube  - humidified air provided via face tent  - Nasal saline spray 1 spray each nose PRN   - Afrin 1 spray for rebleeding followed by direct pressure on the nose, if still actively bleeding, call ENT for reassessment  - monitor hgb and for signs of overt bleeding closely       PULMONARY  Admitted to [facility] on 8/26 after p/w SOB, found to be hypoxemic, required 2-4L NC/bibap, initially responded well to IV steroids. 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 2/2 bacterial PNA vs atypical PNA vs aspiration vs AEILD continued to show significant improvement from a lung perspective and was decannulated 9/21, and extubated to HFNC 9/22, now tolerating 50% face tent.  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   - 9/20 BAL culture: normal respiratory selvin  - continue duonebs  - s/p decannulation on 9/21 with plan for suture removal ~7-10 days   - extubated 9/22 to HFNC   - weaned from HFNC to 6L nasal cannula, given epistaxis currently on face tent. Will continue with face tent until epistaxis fully resolves.  - CT 9/30 w/reticular peripheral lung opacities, LL atelectasis, and bilateral GGO's, improved compared to prior image (9/14) findings that showed extensive lung consolidations    CARDIOVASCULAR  Hx of Afib w at OSH, started on Amiodarone gtt now in shock state requiring requiring pressors likely septic with component of vaspoplegia i/s/o sedation, possible contribution of cardiogenic from RV dysfunction. Had episodes of SVT (9/25) responsive to lopressor   - No PE seen on invasive pulmonary angiography at time of ECMO cannulation or in recent imaging  - NO2 weaned off  (9/15) RV function remains unchanged  - s/p milrinone, off since (9/13)  - converted to sinus (9/14) discontinued amio gtt iso bradycardia, back to AF with RVR on 9/19 when sedation weaned off  - continue 25mg metoprolol BID for rate control   - Phenylephrine off since (9/25),  - continue midodrine to assist with blood pressure, dose lowered from 20mg to 10mg q8 hours iso bradycardia; will titrate down as tolerated  - RITCHIE 9/14 : No MEREDITH thrombus noted, negative for PFO. LVOT diameter of 2 cm  - TTE 9/12 with EF 65-70% with normal LV and RV  - TTE 9/14 with  EF 18%. Severe global LV systolic dysfunction. RV enlargement with decreased RV systolic function, however RITCHIE and recent POCUS shows normal LV systolic function, improved RV systolic function   - TTE 9/19 with recovered EF to 67% but still with RV enlargement and systolic dysfunction  - Repeat TTE pending       GASTROINTESTINAL  Transaminase elevation likely 2/2 combination of shock liver, malposition of ECMO cannula and liver dysfunction  - ALK/ AST/ALT downtrending but remain elevated 624/105/177, T.bili peaked 19.3 mostly direct, and now 9.4  - Acute hepatitis panel negative  - RUQ US 9/15 shows fatty infiltration of liver, negative for hepatobiliary pathology, repeat RUQ US performed 9/22 for worsening LFT's and rising bili with findings negative as well  - Hepatology consulted - likely shock liver with expected delay in improvement in bilirubin  - Hypertriglyceridemia 836, hx of fatty liver and propofol gtt, s/p insulin gtt, now improving  - Given previous watery bowel movements, will continue with senna only at this time. Last BM 10/01   - failed dysphagia screen x2, received continuous TF via KFT  - cineesophagogram today, will start recommended diet minced/moist and thin liquids, keep KFT for now, remove if patient meets nutritional requirements with oral intake  - CT abdomen 9/15 w/o bowel obstruction, noted with colonic diverticulosis mostly in sigmoid, w/o evidence of diverticulitis  - CT abdomen 9/30 w/ sigmoid diverticulosis, without evidence of acute diverticulitis, w/distended gallbladder w/sludge   - HIDA scan 10/01 obtained iso recent imaging, leukocytosis and abdominal discomfort, however no evidence found of acute cholecystitis or biliary obstruction       RENAL  sCr baseline 0.78  - Creatinine wnl  - I/O's:  negative -8.4L/LOS and negative -1040mL/24 hours  - s/p diuresis with lasix, now given PRN, goal to keep net even to slightly negative  - good UO with primafit  - hypernatremia improved, free H2O discontinued 9/21      INFECTIOUS DISEASE  Leukocytosis  - Afebrile, however WBC continues to uptrend, likely iso filgrastim (initiated 9/17-9/21 for leukopenia) vs infection?    - blood cultures negative to date, sputum and right groin (s/p ecmo cannula site) - send wound culture if drainage present  - UA+ with many bacteria, however urine culture negative, s/p CTX 1gm IVPB daily (9/27-10/1 ) and empiric Vanco (9/29-10/1)   - CT imaging CAP 9/30 and HIDA scan 10/01 obtained iso leukocytosis,  findings with no evidence of infection/abscess  - previous cultures, BAL, cytopathology so far negative  - leukopenia now resolved s/p filgrastim (9/17-9/21)  - vanco restarted for ppx on 9/18 d/t leukopenia but stopped on 9/20  - Bactrim DS discontinued iso leukopenia, continue Mepron for PJP prophylaxis instead  - s/p IV Ceftriaxone 5 days? at [facility] out of an abundance of precaution to cover BAL specimen  - s/p zosyn at OSH (9/12) for lingula PNA  - s/p vancomycin (9/12-9/15 ) and azithro (9/14-9/15)   - s/p empiric donald (9/13-9/20) now that ANC >1.5  - positive COVID-19 antigen in late August, recent RVP negative 9/30  - ID following- f/u recs      ENDOCRINE  # Hyperglycemia i/s/o steroids  Admission A1c 6.1  - s/p insulin gtt, transitioned to NPH 6u q6 with mod ISS while receiving TF, will change to Lantus 10 units QHS with mod ISS for now  - continue to adjust insulin and ISS as steroids are tapered and diet changes  - elevated triglycerides 835, has hx of fatty liver, had been on propofol gtt. TG downtrending since initiating insulin gtt for hyperglycemia, now normalizing off propofol  - TSH low initially at 0.13 repeat normal     HEME  #Right IJ thrombus   - s/p argatroban gtt , transitioned to lovenox   - Lowered lovenox dose [id number] BID to 100mg BID based on elevated Anti Xa level (1.14), level redrawn (1.08) now lowered to 90mg iso epistaxis, next Anti Xa level due 10/4 at 1300  - INR elevated likely iso argatroban and liver dysfxn, s/p Vit. K (9/14) and FFP x1 (9/15) - now resolved  - platelets x 7 for thrombocytopenia <50,000, last transfused  9/21 -now improved  - Intermittent episodes of epistaxis, no overt bleeding noted.   - Hgb slight downtrend but remains stable, likely anemia in critical illness and recent episodes of epistaxis    #Leukopenia  #Thrombocytopenia  - Thrombocytopenia refractory after intermittent transfusions while on circuit  - Heme consult placed for thrombocytopenia, noted to also be leukopenic but now resolved   - unlikely HLH/MAS since many abnormalities can be explained by severe hypoxemia/hypotension during initial decompensation prior to ecmo cannulation but some features that are consistent and with rheumatic disease it is a possibility to f/u hematology regarding utility of further lab/pathologic testing  - peripheral blood smear reviewed: large platelets noted, no platelet clumps, no blast cells noted, neutrophils noted w/ normal number of lobes, nucleated RBC noted  - acute hepatitis panel negative  - s/p filgrastim 480 daily  given ANC >1500 in the setting of possible infxn       #R/O DVT  - LE duplex negative for DVT   - first VA duplex post decannulation 9/21 shows non-occlusive deep vein thrombosis in the right jugular vein and the right cephalic is non-compressible  - continue lovenox for now       MSK  Onset of debilitating b/l hand cramps and some muscles weakness x several months, unclear etiology, radiculopathy? vs myositis?   - MRI outpatient reportedly showed cervical spine issues, started on Prednisone shortly before admission at OSH  - myomarker panel + for RNP and Ku  - seen by neurologist at [facility]   - symptoms improved with cellcept (9/8-9/11) but discontinued given Afib RVR   - f/u with Dr. Nik Marie or Dante Urbano for EMG upon discharge (osh?)      RHEUM  - started on Solumedrol 60mg q6h from 9/1 to 9/6 then weaned over time to then Medrol 32 mg 9/9 to 9/12 at [facility]  - s/p Cellcept 9/8 to 9/11 but stopped due to Afib- RVR/tachycardia   - CT findings, Improved/decreased extent of bilateral ground glass opacities and reticular markings compared to the previous study. Findings are nonspecific but differential etiologies include interstitial pneumonia, drug toxicity, nonspecific connective tissue disorders.  - OSH labs show strongly positive ARIANA, RNP, and anti smith antibodies with low C4 and normal C3. 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  - s/p rituximab 9/16/23  - plan for second rituximab dose was for ~9/30/23 but will hold off for now given patient currently receiving antibiotics   - S/P Rituxan dose #2 (10/3)      SKIN  Reportedly had single episode of painful rash on her shins, ears and neck and chest which resolved with a steroid cream from a dermatologist prior to admission  - no evidence of rash currently  - right groin with breakdown s/p cannula placement may be possible source of infection - wound culture not collected, no drainage noted  - as per wound care,  area does not appear infected       PROPHYLAXIS  # DVT: Lovenox  # GI: TF      LINES  -Ecmo Cannulas 9/13-9/21  -[facility] TLC- 9/13-9/21  -Left Ax [place] - 9/13-9/27 (placed at OSH)  -RA 16g PIV x2- 9/15     ASSESSMENT & PLAN:   61 yo F w/ Afib initially presented to urgent care for SOB where she had an XR done concerning for b/l lower infiltrates, sent to [facility] ED and admitted to  on 8/26 after being found to be hypoxemic, reported having  debilitating b/l hand numbness/tingling and some muscles weakness several months. She was found to have interstitial lung disease appearance on CT, with the plan for further ILD workup and management, started on prednisone on admission with gradually improving O2 requirements and stable on 2-3LNC. She underwent bronchoscopy with TBBX on 8/30 which showed Non-Specific Intersitial Disease (NSIP)/OP. Labs notable for positive ARIANA 1:1280, RNP > 8, Alejandro > 8. Patient continued on steroids and received cellcept, which was discontinued 2/2 Afib-RVR. Interval CTA chest on 9/11 also negative PE did show possible lingula pneumonia. Started on empiric vancomycin and zosyn 9/12, course was then c/b episode of SVT to 170s w/ rapidly progressive hypoxemic respiratory failure, placed NRB offered HFNC/BiPAP but refused, leading to worsening hypoxemic respiratory failure requiring intubation on 9/13. Despite best practices, patient remained hypoxemic and patient was cannulated for VV-ECMO on 9/13 and transferred to [facility] for further management. 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 Found to have elevated LFT's/Bili rising likely shock liver and shock state, now improving. Showed significant lung improvement and was decannulated 9/21. Extubated to HFNC 9/22. Currently tolerating 5L NC.    NEUROLOGY  Home meds: gabapentin 100g TID; holding for now  AA&Ox3 at baseline   - following commands w/ delirium likely iso prolonged hospitalization, sedation, and ICU setting. Now improved; following commands and understands conversation.  - required precedex for anxiety, stopped 9/22  - seroquel 25mg QHS for anti +delirium d/c'd,  may have caused lethargy and hypotension, , currently does not require any meds  - CT 9/14 no acute findings  - C/w aggressive PT/OT - Oob to chair twice daily, currently with kandi lift,  increase activity as tolerated  - Social work - [facility]?   - PM&R consult     ENT  #Epistaxis   Small amount of intermittent bleeding noted in b/l nares and hemoptysis?  - Will continue with afrin and nebulized TXA prn; once able to tolerate food, we will remove the NG tube  - humidified air provided via face tent  - Nasal saline spray 1 spray each nose PRN   - Afrin 1 spray for rebleeding followed by direct pressure on the nose, if still actively bleeding, call ENT for reassessment  - monitor hgb and for signs of overt bleeding closely       PULMONARY  Admitted to [facility] on 8/26 after p/w SOB, found to be hypoxemic, required 2-4L NC/bibap, initially responded well to IV steroids. 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 2/2 bacterial PNA vs atypical PNA vs aspiration vs AEILD continued to show significant improvement from a lung perspective and was decannulated 9/21, and extubated to HFNC 9/22, now tolerating 50% face tent.  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   - 9/20 BAL culture: normal respiratory selvin  - continue duonebs  - s/p decannulation on 9/21 with plan for suture removal ~7-10 days   - extubated 9/22 to HFNC   - weaned from HFNC to 6L nasal cannula, given epistaxis currently on face tent. Will continue with face tent until epistaxis fully resolves.  - CT 9/30 w/reticular peripheral lung opacities, LL atelectasis, and bilateral GGO's, improved compared to prior image (9/14) findings that showed extensive lung consolidations    CARDIOVASCULAR  Hx of Afib w at OSH, started on Amiodarone gtt now in shock state requiring requiring pressors likely septic with component of vaspoplegia i/s/o sedation, possible contribution of cardiogenic from RV dysfunction. Had episodes of SVT (9/25) responsive to lopressor   - No PE seen on invasive pulmonary angiography at time of ECMO cannulation or in recent imaging  - NO2 weaned off  (9/15) RV function remains unchanged  - s/p milrinone, off since (9/13)  - converted to sinus (9/14) discontinued amio gtt iso bradycardia, back to AF with RVR on 9/19 when sedation weaned off  - continue 25mg metoprolol BID for rate control   - Phenylephrine off since (9/25),  - continue midodrine to assist with blood pressure, dose lowered from 20mg to 10mg q8 hours iso bradycardia; will titrate down as tolerated  - RITCHIE 9/14 : No MEREDITH thrombus noted, negative for PFO. LVOT diameter of 2 cm  - TTE 9/12 with EF 65-70% with normal LV and RV  - TTE 9/14 with  EF 18%. Severe global LV systolic dysfunction. RV enlargement with decreased RV systolic function, however RITCHIE and recent POCUS shows normal LV systolic function, improved RV systolic function   - TTE 9/19 with recovered EF to 67% but still with RV enlargement and systolic dysfunction  - Repeat TTE pending       GASTROINTESTINAL  Transaminase elevation likely 2/2 combination of shock liver, malposition of ECMO cannula and liver dysfunction  - ALK/ AST/ALT downtrending but remain elevated 624/105/177, T.bili peaked 19.3 mostly direct, and now 9.4  - Acute hepatitis panel negative  - RUQ US 9/15 shows fatty infiltration of liver, negative for hepatobiliary pathology, repeat RUQ US performed 9/22 for worsening LFT's and rising bili with findings negative as well  - Hepatology consulted - likely shock liver with expected delay in improvement in bilirubin  - Hypertriglyceridemia 836, hx of fatty liver and propofol gtt, s/p insulin gtt, now improving  - Given previous watery bowel movements, will continue with senna only at this time. Last BM 10/01   - failed dysphagia screen x2, received continuous TF via KFT  - cineesophagogram today, will start recommended diet minced/moist and thin liquids, keep KFT for now, remove if patient meets nutritional requirements with oral intake  - CT abdomen 9/15 w/o bowel obstruction, noted with colonic diverticulosis mostly in sigmoid, w/o evidence of diverticulitis  - CT abdomen 9/30 w/ sigmoid diverticulosis, without evidence of acute diverticulitis, w/distended gallbladder w/sludge   - HIDA scan 10/01 obtained iso recent imaging, leukocytosis and abdominal discomfort, however no evidence found of acute cholecystitis or biliary obstruction       RENAL  sCr baseline 0.78  - Creatinine wnl  - I/O's:  negative -8.4L/LOS and negative -1040mL/24 hours  - s/p diuresis with lasix, now given PRN, goal to keep net even to slightly negative  - good UO with primafit  - hypernatremia improved, free H2O discontinued 9/21      INFECTIOUS DISEASE  Leukocytosis  - Afebrile, however WBC continues to uptrend, likely iso filgrastim (initiated 9/17-9/21 for leukopenia) vs infection?    - blood cultures negative to date, sputum and right groin (s/p ecmo cannula site) - send wound culture if drainage present  - UA+ with many bacteria, however urine culture negative, s/p CTX 1gm IVPB daily (9/27-10/1 ) and empiric Vanco (9/29-10/1)   - CT imaging CAP 9/30 and HIDA scan 10/01 obtained iso leukocytosis,  findings with no evidence of infection/abscess  - previous cultures, BAL, cytopathology so far negative  - leukopenia now resolved s/p filgrastim (9/17-9/21)  - vanco restarted for ppx on 9/18 d/t leukopenia but stopped on 9/20  - Bactrim DS discontinued iso leukopenia, continue Mepron for PJP prophylaxis instead  - s/p IV Ceftriaxone 5 days? at [facility] out of an abundance of precaution to cover BAL specimen  - s/p zosyn at OSH (9/12) for lingula PNA  - s/p vancomycin (9/12-9/15 ) and azithro (9/14-9/15)   - s/p empiric donald (9/13-9/20) now that ANC >1.5  - positive COVID-19 antigen in late August, recent RVP negative 9/30  - ID following- f/u recs      ENDOCRINE  # Hyperglycemia i/s/o steroids  Admission A1c 6.1  - s/p insulin gtt, transitioned to NPH 6u q6 with mod ISS while receiving TF, will change to Lantus 10 units QHS with mod ISS for now  - continue to adjust insulin and ISS as steroids are tapered and diet changes  - elevated triglycerides 835, has hx of fatty liver, had been on propofol gtt. TG downtrending since initiating insulin gtt for hyperglycemia, now normalizing off propofol  - TSH low initially at 0.13 repeat normal     HEME  #Right IJ thrombus   - s/p argatroban gtt , transitioned to lovenox   - Lowered lovenox dose [id number] BID to 100mg BID based on elevated Anti Xa level (1.14), level redrawn (1.08) now lowered to 90mg iso epistaxis, next Anti Xa level due 10/4 at 1300  - INR elevated likely iso argatroban and liver dysfxn, s/p Vit. K (9/14) and FFP x1 (9/15) - now resolved  - platelets x 7 for thrombocytopenia <50,000, last transfused  9/21 -now improved  - Intermittent episodes of epistaxis, no overt bleeding noted.   - Hgb slight downtrend but remains stable, likely anemia in critical illness and recent episodes of epistaxis    #Leukopenia  #Thrombocytopenia  - Thrombocytopenia refractory after intermittent transfusions while on circuit  - Heme consult placed for thrombocytopenia, noted to also be leukopenic but now resolved   - unlikely HLH/MAS since many abnormalities can be explained by severe hypoxemia/hypotension during initial decompensation prior to ecmo cannulation but some features that are consistent and with rheumatic disease it is a possibility to f/u hematology regarding utility of further lab/pathologic testing  - peripheral blood smear reviewed: large platelets noted, no platelet clumps, no blast cells noted, neutrophils noted w/ normal number of lobes, nucleated RBC noted  - acute hepatitis panel negative  - s/p filgrastim 480 daily  given ANC >1500 in the setting of possible infxn       #R/O [id number]    - LE duplex negative for DVT   - first VA duplex post decannulation 9/21 shows non-occlusive deep vein thrombosis in the right jugular vein and the right cephalic is non-compressible  - continue lovenox for now and dose based on anti-Factor Xa levels      MSK  Onset of debilitating b/l hand cramps and some muscles weakness x several months, unclear etiology, radiculopathy? vs myositis?   - MRI outpatient reportedly showed cervical spine issues, started on Prednisone shortly before admission at OSH  - myomarker panel + for RNP and Ku  - seen by neurologist at [facility]   - symptoms improved with cellcept (9/8-9/11) but discontinued given Afib RVR   - f/u with Dr. Nik Marie or Dante Urbano for EMG upon discharge (osh?)      RHEUM  - started on Solumedrol 60mg q6h from 9/1 to 9/6 then weaned over time to then Medrol 32 mg 9/9 to 9/12 at [facility]  - s/p Cellcept 9/8 to 9/11 but stopped due to Afib- RVR/tachycardia   - CT findings, Improved/decreased extent of bilateral ground glass opacities and reticular markings compared to the previous study. Findings are nonspecific but differential etiologies include interstitial pneumonia, drug toxicity, nonspecific connective tissue disorders.  - OSH labs show strongly positive ARIANA, RNP, and anti smith antibodies with low C4 and normal C3. 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  - s/p rituximab 9/16/23  - plan for second rituximab dose was for ~9/30/23 but will hold off for now given patient currently receiving antibiotics   - S/P Rituxan dose #2 (10/3)      SKIN  Reportedly had single episode of painful rash on her shins, ears and neck and chest which resolved with a steroid cream from a dermatologist prior to admission  - no evidence of rash currently  - right groin with breakdown s/p cannula placement may be possible source of infection - wound culture not collected, no drainage noted  - as per wound care,  area does not appear infected       PROPHYLAXIS  # DVT: Lovenox  # GI: TF      LINES  -Ecmo Cannulas 9/13-9/21  -[facility] TLC- 9/13-9/21  -Left Ax [facility] - 9/13-9/27 (placed at OSH)  -RA 16g PIV x2- 9/15

## 2023-10-04 NOTE — CHART NOTE - NSCHARTNOTEFT_GEN_A_CORE
Notified by RN that patient had continued oozing from ECMO cannulation site at [place]. Dressing soaked from this morning. Surgicell placed on wound and several folded layers of gauze overlying with tegaderm placed over to create pressure dressing. D/w Thoracic Sphil SANTIAGO, will evaluate tonight.

## 2023-10-05 ENCOUNTER — NON-APPOINTMENT (OUTPATIENT)
Age: 63
End: 2023-10-05

## 2023-10-05 ENCOUNTER — TRANSCRIPTION ENCOUNTER (OUTPATIENT)
Age: 63
End: 2023-10-05

## 2023-10-05 ENCOUNTER — INPATIENT (INPATIENT)
Facility: HOSPITAL | Age: 63
LOS: 75 days | Discharge: HOME HEALTH | DRG: 196 | End: 2023-12-20
Attending: INTERNAL MEDICINE | Admitting: INTERNAL MEDICINE
Payer: COMMERCIAL

## 2023-10-05 VITALS
DIASTOLIC BLOOD PRESSURE: 75 MMHG | WEIGHT: 256.62 LBS | OXYGEN SATURATION: 95 % | HEIGHT: 68 IN | TEMPERATURE: 98 F | RESPIRATION RATE: 16 BRPM | SYSTOLIC BLOOD PRESSURE: 124 MMHG | HEART RATE: 85 BPM

## 2023-10-05 VITALS
OXYGEN SATURATION: 98 % | TEMPERATURE: 98 F | RESPIRATION RATE: 16 BRPM | SYSTOLIC BLOOD PRESSURE: 110 MMHG | HEART RATE: 72 BPM | DIASTOLIC BLOOD PRESSURE: 70 MMHG

## 2023-10-05 DIAGNOSIS — J84.9 INTERSTITIAL PULMONARY DISEASE, UNSPECIFIED: ICD-10-CM

## 2023-10-05 LAB
ALBUMIN SERPL ELPH-MCNC: 3 G/DL — LOW (ref 3.3–5)
ALP SERPL-CCNC: 533 U/L — HIGH (ref 40–120)
ALT FLD-CCNC: 173 U/L — HIGH (ref 4–33)
ANION GAP SERPL CALC-SCNC: 15 MMOL/L — HIGH (ref 7–14)
ANTI PM-SCL-100 PLUS: <20 UNITS — SIGNIFICANT CHANGE UP
ANTI-SAE 1 IGG: <20 UNITS — SIGNIFICANT CHANGE UP
ANTI-SS-A 52 KD AB, IGG PLUS: <20 UNITS — SIGNIFICANT CHANGE UP
ANTI-U1-RNP AB PLUS: 151 UNITS — HIGH
AST SERPL-CCNC: 110 U/L — HIGH (ref 4–32)
BASOPHILS # BLD AUTO: 0.1 K/UL — SIGNIFICANT CHANGE UP (ref 0–0.2)
BASOPHILS NFR BLD AUTO: 0.6 % — SIGNIFICANT CHANGE UP (ref 0–2)
BILIRUB DIRECT SERPL-MCNC: 6 MG/DL — HIGH (ref 0–0.3)
BILIRUB INDIRECT FLD-MCNC: 2.5 MG/DL — HIGH (ref 0–1)
BILIRUB SERPL-MCNC: 8.5 MG/DL — HIGH (ref 0.2–1.2)
BUN SERPL-MCNC: 24 MG/DL — HIGH (ref 7–23)
CALCIUM SERPL-MCNC: 9.3 MG/DL — SIGNIFICANT CHANGE UP (ref 8.4–10.5)
CHLORIDE SERPL-SCNC: 98 MMOL/L — SIGNIFICANT CHANGE UP (ref 98–107)
CO2 SERPL-SCNC: 22 MMOL/L — SIGNIFICANT CHANGE UP (ref 22–31)
CREAT SERPL-MCNC: 0.38 MG/DL — LOW (ref 0.5–1.3)
EGFR: 112 ML/MIN/1.73M2 — SIGNIFICANT CHANGE UP
EJ MYOMARKER3 PLUS: NEGATIVE — SIGNIFICANT CHANGE UP
ENA JO1 AB SER IA-ACNC: <20 UNITS — SIGNIFICANT CHANGE UP
EOSINOPHIL # BLD AUTO: 0.02 K/UL — SIGNIFICANT CHANGE UP (ref 0–0.5)
EOSINOPHIL NFR BLD AUTO: 0.1 % — SIGNIFICANT CHANGE UP (ref 0–6)
FIBRILLARIN (U3 RNP) PLUS: NEGATIVE — SIGNIFICANT CHANGE UP
GLUCOSE BLDC GLUCOMTR-MCNC: 138 MG/DL — HIGH (ref 70–99)
GLUCOSE BLDC GLUCOMTR-MCNC: 141 MG/DL — HIGH (ref 70–99)
GLUCOSE BLDC GLUCOMTR-MCNC: 179 MG/DL — HIGH (ref 70–99)
GLUCOSE BLDC GLUCOMTR-MCNC: 277 MG/DL — HIGH (ref 70–99)
GLUCOSE SERPL-MCNC: 95 MG/DL — SIGNIFICANT CHANGE UP (ref 70–99)
HCT VFR BLD CALC: 31.8 % — LOW (ref 34.5–45)
HGB BLD-MCNC: 9.9 G/DL — LOW (ref 11.5–15.5)
IANC: 14.85 K/UL — HIGH (ref 1.8–7.4)
IMM GRANULOCYTES NFR BLD AUTO: 3.1 % — HIGH (ref 0–0.9)
KU MYOMARKER3 PLUS: NEGATIVE — SIGNIFICANT CHANGE UP
LYMPHOCYTES # BLD AUTO: 0.43 K/UL — LOW (ref 1–3.3)
LYMPHOCYTES # BLD AUTO: 2.6 % — LOW (ref 13–44)
MAGNESIUM SERPL-MCNC: 2.1 MG/DL — SIGNIFICANT CHANGE UP (ref 1.6–2.6)
MCHC RBC-ENTMCNC: 31.1 GM/DL — LOW (ref 32–36)
MCHC RBC-ENTMCNC: 33.2 PG — SIGNIFICANT CHANGE UP (ref 27–34)
MCV RBC AUTO: 106.7 FL — HIGH (ref 80–100)
MDA5 (P140)(CADM-140) PLUS: <20 UNITS — SIGNIFICANT CHANGE UP
MI-2 PLUS: NEGATIVE — SIGNIFICANT CHANGE UP
MONOCYTES # BLD AUTO: 0.68 K/UL — SIGNIFICANT CHANGE UP (ref 0–0.9)
MONOCYTES NFR BLD AUTO: 4.1 % — SIGNIFICANT CHANGE UP (ref 2–14)
NEUTROPHILS # BLD AUTO: 14.85 K/UL — HIGH (ref 1.8–7.4)
NEUTROPHILS NFR BLD AUTO: 89.5 % — HIGH (ref 43–77)
NRBC # BLD: 0 /100 WBCS — SIGNIFICANT CHANGE UP (ref 0–0)
NRBC # FLD: 0.02 K/UL — HIGH (ref 0–0)
NXP-2 (P140) MYOPLUS: <20 UNITS — SIGNIFICANT CHANGE UP
OJ MYOMARKER3 PLUS: NEGATIVE — SIGNIFICANT CHANGE UP
PHOSPHATE SERPL-MCNC: 2.9 MG/DL — SIGNIFICANT CHANGE UP (ref 2.5–4.5)
PL-12 PLUS: NEGATIVE — SIGNIFICANT CHANGE UP
PL-7 PLUS: NEGATIVE — SIGNIFICANT CHANGE UP
PLATELET # BLD AUTO: 260 K/UL — SIGNIFICANT CHANGE UP (ref 150–400)
POTASSIUM SERPL-MCNC: 4.1 MMOL/L — SIGNIFICANT CHANGE UP (ref 3.5–5.3)
POTASSIUM SERPL-SCNC: 4.1 MMOL/L — SIGNIFICANT CHANGE UP (ref 3.5–5.3)
PROT SERPL-MCNC: 5.4 G/DL — LOW (ref 6–8.3)
RBC # BLD: 2.98 M/UL — LOW (ref 3.8–5.2)
RBC # FLD: 24.2 % — HIGH (ref 10.3–14.5)
SARS-COV-2 RNA SPEC QL NAA+PROBE: SIGNIFICANT CHANGE UP
SODIUM SERPL-SCNC: 135 MMOL/L — SIGNIFICANT CHANGE UP (ref 135–145)
SRP MYOMARKER3 PLUS: NEGATIVE — SIGNIFICANT CHANGE UP
TIF GAMMA (P155/140) PLUS: <20 UNITS — SIGNIFICANT CHANGE UP
U2 SNRNP PLUS: ABNORMAL
WBC # BLD: 16.6 K/UL — HIGH (ref 3.8–10.5)
WBC # FLD AUTO: 16.6 K/UL — HIGH (ref 3.8–10.5)

## 2023-10-05 PROCEDURE — 99233 SBSQ HOSP IP/OBS HIGH 50: CPT

## 2023-10-05 PROCEDURE — 71045 X-RAY EXAM CHEST 1 VIEW: CPT | Mod: 26

## 2023-10-05 RX ORDER — METOPROLOL TARTRATE 50 MG
25 TABLET ORAL
Refills: 0 | Status: DISCONTINUED | OUTPATIENT
Start: 2023-10-05 | End: 2023-11-15

## 2023-10-05 RX ORDER — SODIUM CHLORIDE 0.65 %
1 AEROSOL, SPRAY (ML) NASAL
Qty: 0 | Refills: 0 | DISCHARGE
Start: 2023-10-05

## 2023-10-05 RX ORDER — LANOLIN ALCOHOL/MO/W.PET/CERES
6 CREAM (GRAM) TOPICAL AT BEDTIME
Refills: 0 | Status: DISCONTINUED | OUTPATIENT
Start: 2023-10-05 | End: 2023-12-20

## 2023-10-05 RX ORDER — IPRATROPIUM/ALBUTEROL SULFATE 18-103MCG
3 AEROSOL WITH ADAPTER (GRAM) INHALATION EVERY 6 HOURS
Refills: 0 | Status: DISCONTINUED | OUTPATIENT
Start: 2023-10-05 | End: 2023-12-20

## 2023-10-05 RX ORDER — DEXTROSE 50 % IN WATER 50 %
15 SYRINGE (ML) INTRAVENOUS ONCE
Refills: 0 | Status: DISCONTINUED | OUTPATIENT
Start: 2023-10-05 | End: 2023-12-20

## 2023-10-05 RX ORDER — SODIUM CHLORIDE 9 MG/ML
1000 INJECTION, SOLUTION INTRAVENOUS
Refills: 0 | Status: DISCONTINUED | OUTPATIENT
Start: 2023-10-05 | End: 2023-12-20

## 2023-10-05 RX ORDER — DEXTROSE 50 % IN WATER 50 %
12.5 SYRINGE (ML) INTRAVENOUS ONCE
Refills: 0 | Status: DISCONTINUED | OUTPATIENT
Start: 2023-10-05 | End: 2023-12-20

## 2023-10-05 RX ORDER — METOPROLOL TARTRATE 50 MG
1 TABLET ORAL
Qty: 0 | Refills: 0 | DISCHARGE
Start: 2023-10-05

## 2023-10-05 RX ORDER — FOLIC ACID 0.8 MG
1 TABLET ORAL
Qty: 0 | Refills: 0 | DISCHARGE
Start: 2023-10-05

## 2023-10-05 RX ORDER — SODIUM CHLORIDE 0.65 %
1 AEROSOL, SPRAY (ML) NASAL EVERY 8 HOURS
Refills: 0 | Status: DISCONTINUED | OUTPATIENT
Start: 2023-10-05 | End: 2023-12-20

## 2023-10-05 RX ORDER — SENNA PLUS 8.6 MG/1
2 TABLET ORAL
Qty: 0 | Refills: 0 | DISCHARGE
Start: 2023-10-05

## 2023-10-05 RX ORDER — SOD,AMMONIUM,POTASSIUM LACTATE
1 CREAM (GRAM) TOPICAL EVERY 12 HOURS
Refills: 0 | Status: DISCONTINUED | OUTPATIENT
Start: 2023-10-05 | End: 2023-10-10

## 2023-10-05 RX ORDER — PANTOPRAZOLE SODIUM 20 MG/1
40 TABLET, DELAYED RELEASE ORAL
Refills: 0 | Status: DISCONTINUED | OUTPATIENT
Start: 2023-10-06 | End: 2023-12-20

## 2023-10-05 RX ORDER — GABAPENTIN 400 MG/1
1 CAPSULE ORAL
Refills: 0 | DISCHARGE

## 2023-10-05 RX ORDER — PANTOPRAZOLE SODIUM 20 MG/1
40 TABLET, DELAYED RELEASE ORAL
Refills: 0 | Status: DISCONTINUED | OUTPATIENT
Start: 2023-10-05 | End: 2023-10-05

## 2023-10-05 RX ORDER — ZINC OXIDE 200 MG/G
1 OINTMENT TOPICAL
Refills: 0 | Status: DISCONTINUED | OUTPATIENT
Start: 2023-10-05 | End: 2023-10-10

## 2023-10-05 RX ORDER — INFLUENZA VIRUS VACCINE 15; 15; 15; 15 UG/.5ML; UG/.5ML; UG/.5ML; UG/.5ML
0.5 SUSPENSION INTRAMUSCULAR ONCE
Refills: 0 | Status: COMPLETED | OUTPATIENT
Start: 2023-10-05 | End: 2023-10-05

## 2023-10-05 RX ORDER — INSULIN LISPRO 100/ML
0 VIAL (ML) SUBCUTANEOUS
Qty: 0 | Refills: 0 | DISCHARGE
Start: 2023-10-05

## 2023-10-05 RX ORDER — ENOXAPARIN SODIUM 100 MG/ML
70 INJECTION SUBCUTANEOUS
Refills: 0 | Status: DISCONTINUED | OUTPATIENT
Start: 2023-10-05 | End: 2023-10-07

## 2023-10-05 RX ORDER — ENOXAPARIN SODIUM 100 MG/ML
70 INJECTION SUBCUTANEOUS
Qty: 0 | Refills: 0 | DISCHARGE
Start: 2023-10-05

## 2023-10-05 RX ORDER — FOLIC ACID 0.8 MG
1 TABLET ORAL DAILY
Refills: 0 | Status: DISCONTINUED | OUTPATIENT
Start: 2023-10-06 | End: 2023-12-20

## 2023-10-05 RX ORDER — MULTIVIT-MIN/FERROUS GLUCONATE 9 MG/15 ML
15 LIQUID (ML) ORAL DAILY
Refills: 0 | Status: DISCONTINUED | OUTPATIENT
Start: 2023-10-06 | End: 2023-10-11

## 2023-10-05 RX ORDER — SENNA PLUS 8.6 MG/1
2 TABLET ORAL AT BEDTIME
Refills: 0 | Status: DISCONTINUED | OUTPATIENT
Start: 2023-10-05 | End: 2023-10-07

## 2023-10-05 RX ORDER — GLUCAGON INJECTION, SOLUTION 0.5 MG/.1ML
1 INJECTION, SOLUTION SUBCUTANEOUS ONCE
Refills: 0 | Status: DISCONTINUED | OUTPATIENT
Start: 2023-10-05 | End: 2023-12-20

## 2023-10-05 RX ORDER — IPRATROPIUM/ALBUTEROL SULFATE 18-103MCG
3 AEROSOL WITH ADAPTER (GRAM) INHALATION
Qty: 0 | Refills: 0 | DISCHARGE
Start: 2023-10-05

## 2023-10-05 RX ORDER — DEXTROSE 50 % IN WATER 50 %
25 SYRINGE (ML) INTRAVENOUS ONCE
Refills: 0 | Status: DISCONTINUED | OUTPATIENT
Start: 2023-10-05 | End: 2023-12-20

## 2023-10-05 RX ORDER — LANOLIN ALCOHOL/MO/W.PET/CERES
2 CREAM (GRAM) TOPICAL
Qty: 0 | Refills: 0 | DISCHARGE
Start: 2023-10-05

## 2023-10-05 RX ORDER — NYSTATIN CREAM 100000 [USP'U]/G
1 CREAM TOPICAL EVERY 12 HOURS
Refills: 0 | Status: DISCONTINUED | OUTPATIENT
Start: 2023-10-05 | End: 2023-10-10

## 2023-10-05 RX ORDER — INSULIN LISPRO 100/ML
VIAL (ML) SUBCUTANEOUS
Refills: 0 | Status: DISCONTINUED | OUTPATIENT
Start: 2023-10-05 | End: 2023-10-10

## 2023-10-05 RX ORDER — ATOVAQUONE 750 MG/5ML
10 SUSPENSION ORAL
Qty: 0 | Refills: 0 | DISCHARGE
Start: 2023-10-05

## 2023-10-05 RX ORDER — MULTIVIT-MIN/FERROUS GLUCONATE 9 MG/15 ML
1 LIQUID (ML) ORAL
Qty: 0 | Refills: 0 | DISCHARGE
Start: 2023-10-05

## 2023-10-05 RX ORDER — ATOVAQUONE 750 MG/5ML
1500 SUSPENSION ORAL DAILY
Refills: 0 | Status: DISCONTINUED | OUTPATIENT
Start: 2023-10-06 | End: 2023-12-20

## 2023-10-05 RX ORDER — PANTOPRAZOLE SODIUM 20 MG/1
1 TABLET, DELAYED RELEASE ORAL
Qty: 0 | Refills: 0 | DISCHARGE
Start: 2023-10-05

## 2023-10-05 RX ADMIN — Medication 3 MILLILITER(S): at 16:38

## 2023-10-05 RX ADMIN — ZINC OXIDE 1 APPLICATION(S): 200 OINTMENT TOPICAL at 21:09

## 2023-10-05 RX ADMIN — Medication 70 MILLIGRAM(S): at 05:52

## 2023-10-05 RX ADMIN — Medication 6: at 11:42

## 2023-10-05 RX ADMIN — MIDODRINE HYDROCHLORIDE 10 MILLIGRAM(S): 2.5 TABLET ORAL at 05:51

## 2023-10-05 RX ADMIN — PANTOPRAZOLE SODIUM 40 MILLIGRAM(S): 20 TABLET, DELAYED RELEASE ORAL at 11:46

## 2023-10-05 RX ADMIN — Medication 1 MILLIGRAM(S): at 11:43

## 2023-10-05 RX ADMIN — ENOXAPARIN SODIUM 70 MILLIGRAM(S): 100 INJECTION SUBCUTANEOUS at 11:41

## 2023-10-05 RX ADMIN — Medication 3 MILLILITER(S): at 12:42

## 2023-10-05 RX ADMIN — ENOXAPARIN SODIUM 70 MILLIGRAM(S): 100 INJECTION SUBCUTANEOUS at 21:10

## 2023-10-05 RX ADMIN — ATOVAQUONE 1500 MILLIGRAM(S): 750 SUSPENSION ORAL at 11:43

## 2023-10-05 RX ADMIN — Medication 15 MILLILITER(S): at 11:42

## 2023-10-05 RX ADMIN — SENNA PLUS 2 TABLET(S): 8.6 TABLET ORAL at 21:10

## 2023-10-05 RX ADMIN — Medication 25 MILLIGRAM(S): at 05:51

## 2023-10-05 RX ADMIN — Medication 6 MILLIGRAM(S): at 21:09

## 2023-10-05 RX ADMIN — Medication 1 DROP(S): at 05:46

## 2023-10-05 RX ADMIN — CHLORHEXIDINE GLUCONATE 1 APPLICATION(S): 213 SOLUTION TOPICAL at 05:46

## 2023-10-05 NOTE — PROGRESS NOTE ADULT - ASSESSMENT
# MCTD, ILD   #Acute hypoxic respiratory failure, DAH   # Acute severe hepatitis   # Epistaxis  # UTI, started on ABX 9/27       - Serology: ARIANA 1:1280 Speckled, dsDNA <12. + SM, + Anti-RNP, U1-snRNP RNP A 162, U1-snRNP RNP-70kd 111  - Skin rash on chest with periungual ulcers, ILD, leukopenia, thrombocytopenia.   - CT chest (8/28): Since August 26, 2023, again interstitial lung disease non-UIP pattern. Although no subpleural sparing, possibly interstitialpneumonia, differential includes NSIP associated with connective tissue disorders, chronic HP or drug toxicity.  - Bronchoscopy results (08/30): Bronchial tissue and interstitium showing organizing pneumonia and focal fibrin  - CT chest (9/11): Improved/decreased extent of bilateral groundglass opacities and reticular markings compared to the previous study. Findings are nonspecific but differential etiologies include interstitial pneumonia, drug toxicity, nonspecific connective tissue disorders. New small focus of parenchymal consolidation seen in the lingula;   possible small focal pneumonia.  - Repeat Bronchoscopy (9/13): BAL performed of lingula. Serial BAL x3 performed of RML with progressively bloody return.  - Lab showed thrombocytopenia since she developed respiratory failure ( normal on the first admission to [facility])   - LFT improving   - S/p Rituximab 1 gr on 9/16/23  - s/p plasmapheresis (First session (9/15), (9/18), (9/20)  - 9/20/2023: lungs are improving clinically and radiographically. Repeat bronch without signs of DAH  - 9/22: Pt extubated and d/c ECMO  - Off HFNC   -9/27: UTI started on ABX  epistaxis, s.p ENT eval- conservative management  heme fup- checking factor Xa - supra therapeutic  -Abx stopped   -S/p 2nd dose of Rituximab on 10/3    Recommendations:    -Steroid taper ***  -continue with PCP ppx   - will follow     DW primary team  # MCTD, ILD   #Acute hypoxic respiratory failure, DAH   # Acute severe hepatitis   # Epistaxis  # UTI (resolved)       - Serology: ARIANA 1:1280 Speckled, dsDNA <12. + SM, + Anti-RNP, U1-snRNP RNP A 162, U1-snRNP, + [id number], RNP-70kd 111  - Skin rash on chest with periungual ulcers, ILD, leukopenia, thrombocytopenia.   - CT chest (8/28): Since August 26, 2023, again interstitial lung disease non-UIP pattern. Although no subpleural sparing, possibly interstitialpneumonia, differential includes NSIP associated with connective tissue disorders, chronic HP or drug toxicity.  - Bronchoscopy results (08/30): Bronchial tissue and interstitium showing organizing pneumonia and focal fibrin  - CT chest (9/11): Improved/decreased extent of bilateral groundglass opacities and reticular markings compared to the previous study. Findings are nonspecific but differential etiologies include interstitial pneumonia, drug toxicity, nonspecific connective tissue disorders. New small focus of parenchymal consolidation seen in the lingula;   possible small focal pneumonia.  - Repeat Bronchoscopy (9/13): BAL performed of lingula. Serial BAL x3 performed of RML with progressively bloody return.  - Lab showed thrombocytopenia since she developed respiratory failure ( normal on the first admission to [facility])   - LFT improving   - S/p Rituximab 1 gr on 9/16/23  - s/p plasmapheresis (First session (9/15), (9/18), (9/20)  - 9/20/2023: lungs are improving clinically and radiographically. Repeat bronch without signs of DAH  - 9/22: Pt extubated and d/c ECMO  - Off HFNC   -9/27: UTI started on ABX  epistaxis, s.p ENT eval- conservative management  heme fup- checking factor Xa - supra therapeutic  -Abx stopped   -S/p 2nd dose of Rituximab on 10/3    Recommendations:  - Okay to transition to PO Medrol (prefer medrol over prednisone due to liver dysfunction): Plan will be to taper by ~20% every 2 weeks  Medrol 64mg x 2 weeks  56mg x 2 weeks  44mg x 2 weeks   36mg x 2 weeks   with remaining taper to monitored in the outpt setting   -continue with PCP ppx   -Per discussion with primary, pt will likely be discharged from [facility] in 2-3 weeks, thus can follow up in person with Rheumatology in early November. Please provide [facility] information in discharge paperwork. Will assist with setting follow up.     Discussed with Dr. Jv Osborne MD   PGY-4  Reachable on TEAMS  Pager [phone]  Rheumatology Fellow

## 2023-10-05 NOTE — H&P ADULT - ATTENDING COMMENTS
* Cardiology consult--evaluation and possible switch from therapeutic lovenox to oral AC agent --hx A fib ( on rate control prior to acute hosp admission--previously declined AC),  and recent Rt IJ thrombus   * Pulmonary consult --Interstitial lung disease with resp failure on Oxy,   * Wound care--Multiple wounds--perineal and buttock/sacral   * Provider pressure offloading gell form to pressure susceptible areas  * Duplex venous ultrasound b/l legs and Rt upper extremity, duplex arterial scan b/l legs  * Discussed Rehab protocol with patient and partner, present at bedside Seen and examined with NP  Patient and her partner present at bedside     63 y/o F. Background hx as noted, including Afib, on beta blocker, admits to have declined AC from her providers in the past   Had recent unremarkable work in [place] for resp symptoms  Admitted to [facility] with resp symptoms.and subsequently transferred to [facility], received extensive investigation and treatment for Interstitial Lung Disease with resp failure requring ECMO, and managed for multiple complicaitons including Transaminitis, Afib with RVR (commenced on therapeutic lovenox) and Rt IJ thrombus  Acute rehab admission 10/5    Living in a private home, in [place], has another house in [place], independent with ADLs at baseline  Currently, has profound functional compromise--dysphagia on restricted diet, max assistance for ADLs, multiple pressure injuries, hypophonic with fatigue, but remains fully alert    Alert and awake, interactive and appropriate, fatigued  On NC oxy 4 L, oxy sats >96% no dyspnea   Chest--good air entry  Abd--soft   Ext--multiple skin injuries-- abrasions on left groin, dressing on right groin, scab on right neck  Non pitting edema both legs  Faint dorsalis pedis, probably due to edema, no erythema    MMT UE 3/5, LE 2+/5 limited by edema    RECENT LABS/IMAGING                        9.9    12.49 )-----------( 208      ( 06 Oct 2023 05:25 )             32.0     10-06    137  |  101  |  22  ----------------------------<  124<H>  3.7   |  29  |  0.46<L>    Ca    8.9      06 Oct 2023 05:25  Phos  2.9     10-05  Mg     2.10     10-05    TPro  5.0<L>  /  Alb  2.2<L>  /  TBili  8.2<H>  /  DBili  x   /  AST  90<H>  /  ALT  181<H>  /  AlkPhos  496<H>  10-06      Urinalysis Basic - ( 06 Oct 2023 05:25 )    Color: x / Appearance: x / SG: x / pH: x  Gluc: 124 mg/dL / Ketone: x  / Bili: x / Urobili: x   Blood: x / Protein: x / Nitrite: x   Leuk Esterase: x / RBC: x / WBC x   Sq Epi: x / Non Sq Epi: x / Bacteria: x      Dx--Interstitial lung disease with resp failure   Commence therapy PT/OT/SLP  incentive spirometry   Transaminitis--monitor LFTs (volume related)   Consult requests  F/u test results as ordered   DVT/Afib--c/w lovenox and await cardiology recs  Continue NC oxy   Pressure offload   Collateral hx obtained from partner  Ext dc to be decided at [facility] of pAFIB and sciatica; who presented to [facility] ED with SOB, and was found to have groundglass opacities and interstitial lung disease. She was treated with empiric Vancomycin and Zosyn. She underwent a bronchoscopy with bronchoalveolar lavage and pulse steroids. She was given IV diuretics for increased pulmonary congestion, but her respiratory status continued to worsen.  On 9/13, she was transferred to [facility] for ECMO requirements. She was further treated with Plasmapheresis, Rituximab and high-dose steroids, with significant improvement. Her overall hospital course was complicated by thrombocytopenia (s/p several platelet transfusions), leukocytosis (infectious workup entirely negative- s/p Vanco and Ceftriaxone), AFIB with RVR (resolved with Metoprolol), dysphagia (requiring NGT), transaminitis (secondary to acute illness and hypotension),  non-occlusive RIJ DVT (started on Lovenox). Patient now admitted for a multidisciplinary rehab program. 10-05-23 @ 13:18     Cardiology consult--evaluation and possible switch from therapeutic lovenox to oral AC agent --hx A fib ( on rate control prior to acute hosp admission--previously declined AC),  and recent Rt IJ thrombus   * Pulmonary consult --Interstitial lung disease with resp failure on Oxy,   * Wound care--Multiple wounds--perineal and buttock/sacral   * Provider pressure offloading gell form to pressure susceptible areas  * Duplex venous ultrasound b/l legs and Rt upper extremity, duplex arterial scan b/l legs  * Discussed Rehab protocol with patient and partner, present at bedside

## 2023-10-05 NOTE — PROGRESS NOTE ADULT - NUTRITIONAL ASSESSMENT
This patient has been assessed with a concern for Malnutrition and has been determined to have a diagnosis/diagnoses of Morbid obesity (BMI > 40).    This patient is being managed with:   Diet NPO with Tube Feed-  Tube Feeding Modality: Orogastric  Glucerna 1.5 Marvin (GLUCERNA1.5RTH)  Total Volume for 24 Hours (mL): 1080  Continuous  Starting Tube Feed Rate {mL per Hour}: 45  Until Goal Tube Feed Rate (mL per Hour): 45  Tube Feed Duration (in Hours): 24  Tube Feed Start Time: 14:00  No Carb Prosource (1pkg = 15gms Protein)     Qty per Day:  3  Entered: Sep 21 2023  1:34PM  
This patient has been assessed with a concern for Malnutrition and has been determined to have a diagnosis/diagnoses of Morbid obesity (BMI > 40).    This patient is being managed with:   Diet NPO-  Entered: Sep 22 2023  5:26PM  
This patient has been assessed with a concern for Malnutrition and has been determined to have a diagnosis/diagnoses of Morbid obesity (BMI > 40).    This patient is being managed with:   Diet NPO with Tube Feed-  Tube Feeding Modality: Nasogastric  Vital High Protein (VITALHP)  Continuous  Starting Tube Feed Rate {mL per Hour}: 10  Increase Tube Feed Rate by (mL): 10     Every 4 hours  Until Goal Tube Feed Rate (mL per Hour): 35  Tube Feed Duration (in Hours): 24  Tube Feed Start Time: 10:00  No Carb Prosource (1pkg = 15gms Protein)     Qty per Day:  4  Entered: Sep 14 2023 10:39AM  
This patient has been assessed with a concern for Malnutrition and has been determined to have a diagnosis/diagnoses of Severe protein-calorie malnutrition.    This patient is being managed with:   Diet Minced and Moist-  Consistent Carbohydrate {No Snacks} (CSTCHO)  Entered: Oct  2 2023  1:00PM  
This patient has been assessed with a concern for Malnutrition and has been determined to have a diagnosis/diagnoses of Severe protein-calorie malnutrition.    This patient is being managed with:   Diet Minced and Moist-  Consistent Carbohydrate {No Snacks} (CSTCHO)  Entered: Oct  2 2023  1:00PM  
This patient has been assessed with a concern for Malnutrition and has been determined to have a diagnosis/diagnoses of Severe protein-calorie malnutrition.    This patient is being managed with:   Diet NPO with Tube Feed-  Tube Feeding Modality: Orogastric  Glucerna 1.5 Marvin (GLUCERNA1.5RTH)  Total Volume for 24 Hours (mL): 1320  Continuous  Starting Tube Feed Rate {mL per Hour}: 55  Until Goal Tube Feed Rate (mL per Hour): 55  Tube Feed Duration (in Hours): 24  Tube Feed Start Time: 14:00  No Carb Prosource (1pkg = 15gms Protein)     Qty per Day:  3  Entered: Sep 26 2023  2:05PM  
This patient has been assessed with a concern for Malnutrition and has been determined to have a diagnosis/diagnoses of Morbid obesity (BMI > 40).    This patient is being managed with:   Diet NPO with Tube Feed-  Tube Feeding Modality: Nasogastric  Pivot 1.5 Marvin (PIVOT1.5RTH)  Total Volume for 24 Hours (mL): 960  Continuous  Starting Tube Feed Rate {mL per Hour}: 30  Increase Tube Feed Rate by (mL): 10     Every 4 hours  Until Goal Tube Feed Rate (mL per Hour): 40  Tube Feed Duration (in Hours): 24  Tube Feed Start Time: 10:00  No Carb Prosource (1pkg = 15gms Protein)     Qty per Day:  3  Entered: Sep 20 2023  2:40PM  
This patient has been assessed with a concern for Malnutrition and has been determined to have a diagnosis/diagnoses of Morbid obesity (BMI > 40).    This patient is being managed with:   Diet NPO with Tube Feed-  Tube Feeding Modality: Nasogastric  Vital High Protein (VITALHP)  Continuous  Starting Tube Feed Rate {mL per Hour}: 10  Increase Tube Feed Rate by (mL): 10     Every 4 hours  Until Goal Tube Feed Rate (mL per Hour): 35  Tube Feed Duration (in Hours): 24  Tube Feed Start Time: 10:00  No Carb Prosource (1pkg = 15gms Protein)     Qty per Day:  4  Entered: Sep 14 2023 10:39AM  
This patient has been assessed with a concern for Malnutrition and has been determined to have a diagnosis/diagnoses of Morbid obesity (BMI > 40).    This patient is being managed with:   Diet NPO with Tube Feed-  Tube Feeding Modality: Nasogastric  Vital High Protein (VITALHP)  Continuous  Starting Tube Feed Rate {mL per Hour}: 10  Increase Tube Feed Rate by (mL): 10     Every 4 hours  Until Goal Tube Feed Rate (mL per Hour): 35  Tube Feed Duration (in Hours): 24  Tube Feed Start Time: 10:00  No Carb Prosource (1pkg = 15gms Protein)     Qty per Day:  4  Entered: Sep 14 2023 10:39AM  
This patient has been assessed with a concern for Malnutrition and has been determined to have a diagnosis/diagnoses of Morbid obesity (BMI > 40).    This patient is being managed with:   Diet NPO with Tube Feed-  Tube Feeding Modality: Orogastric  Glucerna 1.5 Marvin (GLUCERNA1.5RTH)  Total Volume for 24 Hours (mL): 1080  Continuous  Starting Tube Feed Rate {mL per Hour}: 45  Until Goal Tube Feed Rate (mL per Hour): 45  Tube Feed Duration (in Hours): 24  Tube Feed Start Time: 14:00  No Carb Prosource (1pkg = 15gms Protein)     Qty per Day:  3  Entered: Sep 21 2023  1:34PM  
This patient has been assessed with a concern for Malnutrition and has been determined to have a diagnosis/diagnoses of Morbid obesity (BMI > 40).    This patient is being managed with:   Diet NPO with Tube Feed-  Tube Feeding Modality: Orogastric  Glucerna 1.5 Marvin (GLUCERNA1.5RTH)  Total Volume for 24 Hours (mL): 1080  Continuous  Starting Tube Feed Rate {mL per Hour}: 45  Until Goal Tube Feed Rate (mL per Hour): 45  Tube Feed Duration (in Hours): 24  Tube Feed Start Time: 14:00  No Carb Prosource (1pkg = 15gms Protein)     Qty per Day:  3  Entered: Sep 24 2023  1:47PM  
This patient has been assessed with a concern for Malnutrition and has been determined to have a diagnosis/diagnoses of Severe protein-calorie malnutrition.    This patient is being managed with:   Diet NPO with Tube Feed-  Tube Feeding Modality: Orogastric  Glucerna 1.5 Marvin (GLUCERNA1.5RTH)  Total Volume for 24 Hours (mL): 1320  Continuous  Starting Tube Feed Rate {mL per Hour}: 55  Until Goal Tube Feed Rate (mL per Hour): 55  Tube Feed Duration (in Hours): 24  Tube Feed Start Time: 14:00  No Carb Prosource (1pkg = 15gms Protein)     Qty per Day:  3  Entered: Sep 26 2023  2:05PM    This patient has been assessed with a concern for Malnutrition and has been determined to have a diagnosis/diagnoses of Severe protein-calorie malnutrition.    This patient is being managed with:   Diet NPO with Tube Feed-  Tube Feeding Modality: Orogastric  Glucerna 1.5 Marvin (GLUCERNA1.5RTH)  Total Volume for 24 Hours (mL): 1320  Continuous  Starting Tube Feed Rate {mL per Hour}: 55  Until Goal Tube Feed Rate (mL per Hour): 55  Tube Feed Duration (in Hours): 24  Tube Feed Start Time: 14:00  No Carb Prosource (1pkg = 15gms Protein)     Qty per Day:  3  Entered: Sep 26 2023  2:05PM  
This patient has been assessed with a concern for Malnutrition and has been determined to have a diagnosis/diagnoses of Morbid obesity (BMI > 40).    This patient is being managed with:   Diet NPO with Tube Feed-  Tube Feeding Modality: Nasogastric  Vital High Protein (VITALHP)  Continuous  Starting Tube Feed Rate {mL per Hour}: 10  Increase Tube Feed Rate by (mL): 10     Every 4 hours  Until Goal Tube Feed Rate (mL per Hour): 35  Tube Feed Duration (in Hours): 24  Tube Feed Start Time: 10:00  No Carb Prosource (1pkg = 15gms Protein)     Qty per Day:  4  Entered: Sep 14 2023 10:39AM  
This patient has been assessed with a concern for Malnutrition and has been determined to have a diagnosis/diagnoses of Severe protein-calorie malnutrition.    This patient is being managed with:   Diet Minced and Moist-  Consistent Carbohydrate {No Snacks} (CSTCHO)  Entered: Oct  2 2023  1:00PM  
This patient has been assessed with a concern for Malnutrition and has been determined to have a diagnosis/diagnoses of Severe protein-calorie malnutrition.    This patient is being managed with:   Diet NPO with Tube Feed-  Tube Feeding Modality: Orogastric  Glucerna 1.5 Marvin (GLUCERNA1.5RTH)  Total Volume for 24 Hours (mL): 1320  Continuous  Starting Tube Feed Rate {mL per Hour}: 55  Until Goal Tube Feed Rate (mL per Hour): 55  Tube Feed Duration (in Hours): 24  Tube Feed Start Time: 14:00  No Carb Prosource (1pkg = 15gms Protein)     Qty per Day:  3  Entered: Sep 26 2023  2:05PM  
This patient has been assessed with a concern for Malnutrition and has been determined to have a diagnosis/diagnoses of Severe protein-calorie malnutrition.    This patient is being managed with:   Diet NPO with Tube Feed-  Tube Feeding Modality: Orogastric  Peptamen 1.5 (PEPTAMEN1.5RTH)  Total Volume for 24 Hours (mL): 1200  Continuous  Starting Tube Feed Rate {mL per Hour}: 20  Increase Tube Feed Rate by (mL): 10  Until Goal Tube Feed Rate (mL per Hour): 50  Tube Feed Duration (in Hours): 24  Tube Feed Start Time: 12:35  Liquid Protein Supplement     Qty per Day:  3  Entered: Sep 30 2023  1:19PM  
This patient has been assessed with a concern for Malnutrition and has been determined to have a diagnosis/diagnoses of Morbid obesity (BMI > 40).    This patient is being managed with:   Diet NPO with Tube Feed-  Tube Feeding Modality: Nasogastric  Vital High Protein (VITALHP)  Continuous  Starting Tube Feed Rate {mL per Hour}: 10  Increase Tube Feed Rate by (mL): 10     Every 4 hours  Until Goal Tube Feed Rate (mL per Hour): 35  Tube Feed Duration (in Hours): 24  Tube Feed Start Time: 10:00  No Carb Prosource (1pkg = 15gms Protein)     Qty per Day:  4  Entered: Sep 14 2023 10:39AM  
This patient has been assessed with a concern for Malnutrition and has been determined to have a diagnosis/diagnoses of Morbid obesity (BMI > 40).    This patient is being managed with:   Diet NPO with Tube Feed-  Tube Feeding Modality: Nasogastric  Vital High Protein (VITALHP)  Continuous  Starting Tube Feed Rate {mL per Hour}: 10  Increase Tube Feed Rate by (mL): 10     Every 4 hours  Until Goal Tube Feed Rate (mL per Hour): 35  Tube Feed Duration (in Hours): 24  Tube Feed Start Time: 10:00  No Carb Prosource (1pkg = 15gms Protein)     Qty per Day:  4  Entered: Sep 14 2023 10:39AM  
This patient has been assessed with a concern for Malnutrition and has been determined to have a diagnosis/diagnoses of Morbid obesity (BMI > 40).    This patient is being managed with:   Diet NPO with Tube Feed-  Tube Feeding Modality: Orogastric  Glucerna 1.5 Marvin (GLUCERNA1.5RTH)  Total Volume for 24 Hours (mL): 1080  Continuous  Starting Tube Feed Rate {mL per Hour}: 45  Until Goal Tube Feed Rate (mL per Hour): 45  Tube Feed Duration (in Hours): 24  Tube Feed Start Time: 14:00  No Carb Prosource (1pkg = 15gms Protein)     Qty per Day:  3  Entered: Sep 24 2023  1:47PM  
This patient has been assessed with a concern for Malnutrition and has been determined to have a diagnosis/diagnoses of Severe protein-calorie malnutrition.    This patient is being managed with:   Diet NPO with Tube Feed-  Tube Feeding Modality: Orogastric  Glucerna 1.5 Marvin (GLUCERNA1.5RTH)  Total Volume for 24 Hours (mL): 1320  Continuous  Starting Tube Feed Rate {mL per Hour}: 55  Until Goal Tube Feed Rate (mL per Hour): 55  Tube Feed Duration (in Hours): 24  Tube Feed Start Time: 14:00  No Carb Prosource (1pkg = 15gms Protein)     Qty per Day:  3  Entered: Sep 26 2023  2:05PM  
This patient has been assessed with a concern for Malnutrition and has been determined to have a diagnosis/diagnoses of Severe protein-calorie malnutrition.    This patient is being managed with:   Diet NPO with Tube Feed-  Tube Feeding Modality: Orogastric  Peptamen 1.5 (PEPTAMEN1.5RTH)  Total Volume for 24 Hours (mL): 1200  Continuous  Starting Tube Feed Rate {mL per Hour}: 20  Increase Tube Feed Rate by (mL): 10  Until Goal Tube Feed Rate (mL per Hour): 50  Tube Feed Duration (in Hours): 24  Tube Feed Start Time: 12:35  Liquid Protein Supplement     Qty per Day:  3  Entered: Sep 30 2023  1:19PM

## 2023-10-05 NOTE — DISCHARGE NOTE PROVIDER - NSDCMRMEDTOKEN_GEN_ALL_CORE_FT
aluminum hydroxide-magnesium hydroxide 200 mg-200 mg/5 mL oral suspension: 30 milliliter(s) orally every 4 hours As needed Dyspepsia  Antioxidant Formula oral tablet: 1 tablet with sensor orally once a day  atovaquone 750 mg/5 mL oral suspension: 10 milliliter(s) orally once a day  enoxaparin: 70 milligram(s) subcutaneous every 12 hours  folic acid 1 mg oral tablet: 1 tab(s) orally once a day  insulin lispro 100 units/mL injectable solution: Low-dose insulin sliding scale  ipratropium-albuterol 0.5 mg-2.5 mg/3 mL inhalation solution: 3 milliliter(s) inhaled every 6 hours as needed for  shortness of breath and/or wheezing  Medrol 16 mg oral tablet: 2 tab(s) orally once a day  melatonin 3 mg oral tablet: 2 tab(s) orally once a day (at bedtime)  metoprolol tartrate 25 mg oral tablet: 1 tab(s) orally 2 times a day Hold for HR &lt;60 or Systolic BP &lt;100  ocular lubricant ophthalmic solution: 1 drop(s) to each affected eye every 12 hours  Protonix 40 mg oral delayed release tablet: 1 tab(s) orally once a day (before a meal)  senna (sennosides) 8.6 mg oral tablet: 2 tab(s) orally once a day (at bedtime)  sodium chloride 0.65% nasal spray: 1 spray(s) nasal every 4 hours as needed for  dry nasal passages   aluminum hydroxide-magnesium hydroxide 200 mg-200 mg/5 mL oral suspension: 30 milliliter(s) orally every 4 hours As needed Dyspepsia  Antioxidant Formula oral tablet: 1 tablet with sensor orally once a day  atovaquone 750 mg/5 mL oral suspension: 10 milliliter(s) orally once a day  enoxaparin: 70 milligram(s) subcutaneous every 12 hours  folic acid 1 mg oral tablet: 1 tab(s) orally once a day  insulin lispro 100 units/mL injectable solution: injectable 4 times a day (before meals and at bedtime) Low-dose insulin sliding scale  ipratropium-albuterol 0.5 mg-2.5 mg/3 mL inhalation solution: 3 milliliter(s) inhaled every 6 hours as needed for  shortness of breath and/or wheezing  Medrol 32 mg oral tablet: 2 tab(s) orally once a day  melatonin 3 mg oral tablet: 2 tab(s) orally once a day (at bedtime)  metoprolol tartrate 25 mg oral tablet: 1 tab(s) orally 2 times a day Hold for HR &lt;60 or Systolic BP &lt;100  ocular lubricant ophthalmic solution: 1 drop(s) to each affected eye every 12 hours  Protonix 40 mg oral delayed release tablet: 1 tab(s) orally once a day (before a meal)  senna (sennosides) 8.6 mg oral tablet: 2 tab(s) orally once a day (at bedtime)  sodium chloride 0.65% nasal spray: 1 spray(s) nasal every 4 hours as needed for  dry nasal passages

## 2023-10-05 NOTE — DISCHARGE NOTE PROVIDER - CARE PROVIDERS DIRECT ADDRESSES
,DirectAddress_Unknown,DirectAddress_Unknown,[email or web address]

## 2023-10-05 NOTE — DISCHARGE NOTE PROVIDER - HOSPITAL COURSE
62 year old female with a past medical history of afib, and sciatica, who presented to [facility] for shortness of breath. She had gone to [facility] where she had CXR which showed bilateral lower lobe infiltrates so was sent to ER. She had been having exertional dyspnea and hypoxemia (on home pulse ox to 82%). She had been having dyspnea for several months and had a workup in [place] with a CT scan (which was negative for PE). She also states that she was seen by a pulmonologist and rheumatology, where they ruled out lupus and other immunological disorders.    [facility] Hospital Course  Found to be hypoxic and have interstitial lung disease appearance on CT, admitted 8/26 for AHRF, further ILD workup and management. TTE 8/26 with normal LVEF. Pt was started on prednisone on admission with gradually improving O2 requirement and has been stable on 2-3LNC since 9/3. Started steroid taper on 9/6 and fully transitioned to PO 9/8 overnight. Started on Mycophenolate mofetil (cellcept) 9/8 evening but pt reported subjective side effects with weakness. Episode of AF w RVR with HR up to 140s on 9/11 am, decreased to HR 10-110s with IV lopressor 10. CTA negative for PE and showed interval improvements in GGO but possible lingular bleb and RML bronchiectasis. Patient received 2L of but before more fluids and beta-blocker pt developed heart palpitations with EKG HR 170s with SVT. BPs 105/70s. Did not respond to vagal maneuvers. Pt given oral lopressor 12.5 and IV lopressor 5, with improvement of HR to 130s. SBP briefly dropped to 60-70s then recovered. Patient on NRB offered HFNC/BiPAP but refused. Started on empiric vancomycin and zosyn. BCx w/ NGTD. Per pulm increased solumedrol to 40mg qd. Patient acceded to HFNC in which she desatted and presented with increased wob for which she was transitioned to BiPAP. Patient with anxiety while on BiPAP presenting tachypnea and desatting to the 80s despite verbal redirection for which she was administered ativan 1mg IVP x1. Patient distress improved with sats in the low 90s but had desaturation to 70s. STAT CXR showed increased pulmonary congestion for which patient was administered lasix without improvement. Intubated and started on mechanical ventilation but required very high PEEP (18) and 100% FiO2 and still had low saturations. VV ECMO team consulted and accepted to [facility] center. Per signout patient had RV enlargement and dysfunction on POCUS with concern for either new PE vs high pulmonary pressures due to PEEP 18 and lung dysfunction. Patient went for cannulation at [facility] with flouro showing no acute PE. Cannulated with 25 F drainage cannula in R Fem V and 23F “arterial” cannula in RIJ.     [facility] Hospital Course  Arrived with nitric oxide 40PPM, primacor, amiodarone gtt, with multiple vasopressors, which has since been weaned off. 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 Thrombocytopenic while on ECMO circuit, received several platelet transfusions. Showed significant lung improvement and was decannulated 9/21. Extubated to NC 9/22, then face-tent 50% d/t episodes of epistaxis, and is currently tolerating 4L NC. Had some post-extubation delirium which has resolved. Failed swallow eval x2, required tube feeds via NGT, passed cinesophagram, now on PO diet. Course was also c/b leukopenia, is s/p fligrastim and now with leukocytosis of unknown origin w/o fever, cultures and imaging (including pan-scan, multiple RUQUS and HIDA)are negative of any infectious etiology, s/p empiric Vanco and CTX. She was found to have nonocclusive RIJ clot on duplex on 9/21 so patient was continued on argatroban gtt which was then switched to therapeutic lovenox. Has had elevated liver enzymes throughout hospital stay - attributed to slow resolution of shock liver (seen by hepatology, multiple RUQUS and HIDA Scan). Due to epistaxis have been titrating lovenox dose to anti-Xa levels (currently on 70 bid). Had some episodes of AF with RVR/SVT that have now resolved on beta-blockers. ECMO sutures have been removed by thoracic surgery. Tolerating and will continue to require aggressive PT. Transferred to RCU and continued to improve - weaned down to 2-4L NC while at rest and 4L with exercise.       Patient to follow up with Dr. Addy Powers for pulmonary/ILD, Dr. Kwame Hawley for post-ECMO/Lung Transplant, and Dr. Jv Meneses for Rheumatology.    For follow-up care at rehab:  [ ] please titrate lovenox dose to anti-Xa levels. Levels should be checked 4 hours after dose has been given (and after at least 4 doses of the same dose after a change). Goal is 0.5-0.8 (low end of therapeutic range) given epistaxis. If elevated would decrease by 10mg and recheck. Can consider switching to DOAC when ready to go home if patient prefers to avoid injections although less able to titrate dose.  [ ] please recheck liver enzymes in about a week to ensure continuing to improve.  [ ] please taper steroids:     Wound Care Instructions:  -->Moisture associated dermatitis to intertriginous folds- submammary and abdominal pannus- Cleanse with soap and water, dry well. Apply Interdry textile sheeting, under intertriginous folds leaving 2 inches exposed at ends to wick, remove to wash & dry affected area, then replace. Individual sheeting may be used for up to 5 days unless soiled.  -->Left groin MAD with shallow wound- Cleanse groin with circular motion with gauze, soap (antiseptic) and sterile water, dry well. Apply TRIAD thin layer moisture barrier paste daily.  -->Right groin MAD with wound-  Cleanse groin with circular motion with gauze, soap (antiseptic) and sterile water, dry well. Pack dead space with Aquacel hydrofiber leaving 2" out at end to wick, apply TRIAD thin layer to remaining shallow wound. Cover with silicone foam with border. Change daily and prn.  -->Bilateral groin and abdmoinal pannus- Apply Interdry textile sheeting, under intertriginous folds leaving 2 inches exposed at ends to wick, remove to wash & dry affected area, then replace. Individual sheeting may be used for up to 5 days unless soiled.   -->Continue to offload pressure; low airloss support surface, t&P per protocol with use of fluidized postioning devices, continue incontinence care per protocol with single breathable incontinence pad, continue to offload heels with complete cair boots. **If possible would be ideal to have patient in reverse Trendelenburg while in bed for proper pulmonary toileting, while limiting flexion of waist to minimize moisture collection within intertriginous folds to optimize healing 62 year old female with a past medical history of afib, and sciatica, who presented to [facility] for shortness of breath. She had gone to [facility] where she had CXR which showed bilateral lower lobe infiltrates so was sent to ER. She had been having exertional dyspnea and hypoxemia (on home pulse ox to 82%). She had been having dyspnea for several months and had a workup in [place] with a CT scan (which was negative for PE). She also states that she was seen by a pulmonologist and rheumatology, where they ruled out lupus and other immunological disorders.    [facility] Hospital Course  Found to be hypoxic and have interstitial lung disease appearance on CT, admitted 8/26 for AHRF, further ILD workup and management. TTE 8/26 with normal LVEF. Pt was started on prednisone on admission with gradually improving O2 requirement and has been stable on 2-3LNC since 9/3. Started steroid taper on 9/6 and fully transitioned to PO 9/8 overnight. Started on Mycophenolate mofetil (cellcept) 9/8 evening but pt reported subjective side effects with weakness. Episode of AF w RVR with HR up to 140s on 9/11 am, decreased to HR 10-110s with IV lopressor 10. CTA negative for PE and showed interval improvements in GGO but possible lingular bleb and RML bronchiectasis. Patient received 2L of but before more fluids and beta-blocker pt developed heart palpitations with EKG HR 170s with SVT. BPs 105/70s. Did not respond to vagal maneuvers. Pt given oral lopressor 12.5 and IV lopressor 5, with improvement of HR to 130s. SBP briefly dropped to 60-70s then recovered. Patient on NRB offered HFNC/BiPAP but refused. Started on empiric vancomycin and zosyn. BCx w/ NGTD. Per pulm increased solumedrol to 40mg qd. Patient acceded to HFNC in which she desatted and presented with increased wob for which she was transitioned to BiPAP. Patient with anxiety while on BiPAP presenting tachypnea and desatting to the 80s despite verbal redirection for which she was administered ativan 1mg IVP x1. Patient distress improved with sats in the low 90s but had desaturation to 70s. STAT CXR showed increased pulmonary congestion for which patient was administered lasix without improvement. Intubated and started on mechanical ventilation but required very high PEEP (18) and 100% FiO2 and still had low saturations. VV ECMO team consulted and accepted to [facility] Injury center. Per signout patient had RV enlargement and dysfunction on POCUS with concern for either new PE vs high pulmonary pressures due to PEEP 18 and lung dysfunction. Patient went for cannulation at [facility] with flouro showing no acute PE. Cannulated with 25 F drainage cannula in R Fem V and 23F “arterial” cannula in RIJ.     [facility] Hospital Course  Arrived with nitric oxide 40PPM, primacor, amiodarone gtt, with multiple vasopressors, which has since been weaned off. 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 Thrombocytopenic while on ECMO circuit, received several platelet transfusions. Showed significant lung improvement and was decannulated 9/21. Extubated to NC 9/22, then face-tent 50% d/t episodes of epistaxis, and is currently tolerating 4L NC. Had some post-extubation delirium which has resolved. Failed swallow eval x2, required tube feeds via NGT, passed cinesophagram, now on PO diet. Course was also c/b leukopenia, is s/p fligrastim and now with leukocytosis of unknown origin w/o fever, cultures and imaging (including pan-scan, multiple RUQUS and HIDA)are negative of any infectious etiology, s/p empiric Vanco and CTX. She was found to have nonocclusive RIJ clot on duplex on 9/21 so patient was continued on argatroban gtt which was then switched to therapeutic lovenox. Has had elevated liver enzymes throughout hospital stay - attributed to slow resolution of shock liver (seen by hepatology, multiple RUQUS and HIDA Scan). Due to epistaxis have been titrating lovenox dose to anti-Xa levels (currently on 70 bid). Had some episodes of AF with RVR/SVT that have now resolved on beta-blockers. ECMO sutures have been removed by thoracic surgery. Tolerating and will continue to require aggressive PT. Transferred to RCU and continued to improve - weaned down to 2-4L NC while at rest and 4L with exercise.       Patient to follow up with Dr. Addy Powers for pulmonary/ILD, Dr. Kwame Hawley for post-ECMO/Lung Transplant, and Dr. Jv Meneses for Rheumatology.    For follow-up care at rehab:  [ ] please titrate lovenox dose to anti-Xa levels. Levels should be checked 4 hours after dose has been given (and after at least 4 doses of the same dose after a change). Goal is 0.5-0.8 (low end of therapeutic range) given epistaxis. If elevated would decrease by 10mg and recheck. Can consider switching to DOAC when ready to go home if patient prefers to avoid injections although less able to titrate dose.  [ ] please recheck liver enzymes in about a week to ensure continuing to improve.  [ ] please taper steroids by 20% every two weeks: PO medrol 64mg daily starting 10/6. On 10/19 decrease to 56mg daily . On 11/2 can decrease to 44mg daily . On 11/16 can decrease to 36mg daily. (by this point she should have seen Rheum in clinic and remainder of taper can be discussed at that time).     Wound Care Instructions:  -->Moisture associated dermatitis to intertriginous folds- submammary and abdominal pannus- Cleanse with soap and water, dry well. Apply Interdry textile sheeting, under intertriginous folds leaving 2 inches exposed at ends to wick, remove to wash & dry affected area, then replace. Individual sheeting may be used for up to 5 days unless soiled.  -->Left groin MAD with shallow wound- Cleanse groin with circular motion with gauze, soap (antiseptic) and sterile water, dry well. Apply TRIAD thin layer moisture barrier paste daily.  -->Right groin MAD with wound-  Cleanse groin with circular motion with gauze, soap (antiseptic) and sterile water, dry well. Pack dead space with Aquacel hydrofiber leaving 2" out at end to wick, apply TRIAD thin layer to remaining shallow wound. Cover with silicone foam with border. Change daily and prn.  -->Bilateral groin and abdmoinal pannus- Apply Interdry textile sheeting, under intertriginous folds leaving 2 inches exposed at ends to wick, remove to wash & dry affected area, then replace. Individual sheeting may be used for up to 5 days unless soiled.   -->Continue to offload pressure; low airloss support surface, t&P per protocol with use of fluidized postioning devices, continue incontinence care per protocol with single breathable incontinence pad, continue to offload heels with complete cair boots. **If possible would be ideal to have patient in reverse Trendelenburg while in bed for proper pulmonary toileting, while limiting flexion of waist to minimize moisture collection within intertriginous folds to optimize healing

## 2023-10-05 NOTE — PATIENT PROFILE ADULT - FALL HARM RISK - HARM RISK INTERVENTIONS

## 2023-10-05 NOTE — PROGRESS NOTE ADULT - PROVIDER SPECIALTY LIST ADULT
Critical Care
Infectious Disease
MICU
MICU
Rheumatology
Critical Care
Infectious Disease
Infectious Disease
MICU
MICU
Pulmonology
Rheumatology
Wound Care
Critical Care
MICU
Pulmonology
Rheumatology
Heme/Onc
MICU
Pulmonology
Critical Care
Critical Care
ECMO
ECMO
Palliative Care
Palliative Care

## 2023-10-05 NOTE — DISCHARGE NOTE NURSING/CASE MANAGEMENT/SOCIAL WORK - NSDCPEFALRISK_GEN_ALL_CORE
For information on Fall & Injury Prevention, visit: [email or web address] OR  [email or web address] OR  https://www.cdc.gov/steadi/patient.html

## 2023-10-05 NOTE — H&P ADULT - NSHPSOCIALHISTORY_GEN_ALL_CORE
SOCIAL HISTORY  Smoking - Denied  EtOH - Denied   Drugs - Denied    FUNCTIONAL HISTORY  Lives with sig other, has elevator apt in [place], [place], working as executive    Independent AMB and ADLs PTA   CURRENT FUNCTIONAL STATUS  10/2 SLP/MBS  mild oral dysphagia/mild pharyngeal dysphagia  minced moist solids, thin liquids    10/2 PT  transfers dependent with lift     9/26 OT  bed mobiilty max assist x 2

## 2023-10-05 NOTE — DISCHARGE NOTE NURSING/CASE MANAGEMENT/SOCIAL WORK - PATIENT PORTAL LINK FT
You can access the FollowMyHealth Patient Portal offered by [facility] by registering at the following website: [email or web address]. By joining [facility]’s FollowMyHealth portal, you will also be able to view your health information using other applications (apps) compatible with our system.

## 2023-10-05 NOTE — DISCHARGE NOTE PROVIDER - PROVIDER TOKENS
PROVIDER:[TOKEN:[[id number]]],PROVIDER:[TOKEN:[[id number]]],PROVIDER:[TOKEN:[[id number]]]

## 2023-10-05 NOTE — H&P ADULT - NSHPPHYSICALEXAM_GEN_ALL_CORE
PHYSICAL EXAM  VITALS  T(C): 36.6 (10-05-23 @ 18:51), Max: 37.1 (10-05-23 @ 08:00)  HR: 85 (10-05-23 @ 18:51) (72 - 85)  BP: 124/75 (10-05-23 @ 18:51) (101/57 - 124/75)  RR: 16 (10-05-23 @ 18:51) (16 - 18)  SpO2: 95% (10-05-23 @ 18:51) (95% - 100%)    Gen - NAD, Comfortable  HEENT - NCAT, EOMI, MMM  Neck - Supple, No limited ROM  Pulm - CTAB, No wheeze, No rhonchi, No crackles  Cardiovascular - RRR, S1S2  Chest - good chest expansion, good respiratory effort  Abdomen - Soft, NT/ND, +BS  Extremities - No Cyanosis, no clubbing, 2+ edema to b/l feet, no calf tenderness, LUE SL PICC  Neuro-     Cognitive - awake, alert, oriented to person, place, date, year, and situation.  Able to follow command     Communication -hypophonic but comprehensible     Attention: Intact     Memory:  memory intact     Cranial Nerves - CN 2-12 intact.     Motor -                    LEFT    UE - 2/5                    RIGHT UE - 2/5                     LEFT    LE - 2-/5                    RIGHT LE - 2-/5        Sensory - Intact        Reflexes - DTR Intact, No primitive reflexive     Coordination - FTN/HTS  impaired   MSK: soreness and weakness  Psychiatric - Mood stable, Affect WNL  Skin: Left lower abdomen ruptured blister 3.0 x 1.0, stage 2 pressure injury to right buttock 4 x1 and left buttock 3x1, stage 2 pressure injury ti right inner thigh 0.2 x 0.2, right groin wound 10.5 x 2.0, Left groin wound 5 x 5,  right neck scab wound PHYSICAL EXAM  VITALS  T(C): 36.6 (10-05-23 @ 18:51), Max: 37.1 (10-05-23 @ 08:00)  HR: 85 (10-05-23 @ 18:51) (72 - 85)  BP: 124/75 (10-05-23 @ 18:51) (101/57 - 124/75)  RR: 16 (10-05-23 @ 18:51) (16 - 18)  SpO2: 95% (10-05-23 @ 18:51) (95% - 100%)    Gen - NAD, Comfortable  HEENT - NCAT, EOMI, MMM  Neck - Supple, No limited ROM, O2 via NC  Pulm - CTAB, No wheeze, No rhonchi, No crackles  Cardiovascular - RRR, S1S2  Chest - good chest expansion, good respiratory effort  Abdomen - Soft, NT/ND, +BS  Extremities - No Cyanosis, no clubbing, 2+ edema to b/l feet, no calf tenderness, LUE SL PICC  Neuro-     Cognitive - awake, alert, oriented to person, place, date, year.  Able to follow command     Communication -hypophonic but comprehensible     Attention: Intact     Memory:  memory intact     Cranial Nerves - CN 2-12 intact.     Motor -                    LEFT    UE - 2/5                    RIGHT UE - 2/5                     LEFT    LE - 2-/5                    RIGHT LE - 2-/5        Sensory - Intact        Reflexes - DTR Intact, No primitive reflexive     Coordination - FTN  impaired   MSK: soreness and weakness  Psychiatric - Mood stable, Affect WNL  Skin: Left lower abdomen ruptured blister 3.0 x 1.0, stage 2 pressure injury to right buttock 4 x1 and left buttock 3x1, stage 2 pressure injury ti right inner thigh 0.2 x 0.2, right groin wound 10.5 x 2.0, Left groin wound 5 x 5,  right neck scab wound PHYSICAL EXAM  VITALS  T(C): 36.6 (10-05-23 @ 18:51), Max: 37.1 (10-05-23 @ 08:00)  HR: 85 (10-05-23 @ 18:51) (72 - 85)  BP: 124/75 (10-05-23 @ 18:51) (101/57 - 124/75)  RR: 16 (10-05-23 @ 18:51) (16 - 18)  SpO2: 95% (10-05-23 @ 18:51) (95% - 100%)    Gen - NAD, Comfortable  HEENT - NCAT, EOMI, MMM  Neck - Supple, No limited ROM, O2 via NC  Pulm - diminished across lung field  Cardiovascular - RRR, S1S2  Chest - good chest expansion, good respiratory effort  Abdomen - Soft, NT/ND, +BS  Extremities - No Cyanosis, no clubbing, 2+ edema to b/l feet, no calf tenderness, LUE SL PICC  Neuro-     Cognitive - awake, alert, oriented to person, place, date, year.  Able to follow command     Communication -hypophonic but comprehensible     Attention: Intact     Memory:  memory intact     Cranial Nerves - CN 2-12 intact.     Motor -                    LEFT    UE - 2/5                    RIGHT UE - 2/5                     LEFT    LE - 2-/5                    RIGHT LE - 2-/5        Sensory - Intact        Reflexes - DTR Intact, No primitive reflexive     Coordination - FTN  impaired   MSK: soreness and weakness  Psychiatric - Mood stable, Affect WNL  Skin: Left lower abdomen ruptured blister 3.0 x 1.0, stage 2 pressure injury to right buttock 4 x1 and left buttock 3x1, stage 2 pressure injury ti right inner thigh 0.2 x 0.2, right groin wound 10.5 x 2.0, Left groin wound 5 x 5,  right neck scab wound PHYSICAL EXAM  VITALS  T(C): 36.6 (10-05-23 @ 18:51), Max: 37.1 (10-05-23 @ 08:00)  HR: 85 (10-05-23 @ 18:51) (72 - 85)  BP: 124/75 (10-05-23 @ 18:51) (101/57 - 124/75)  RR: 16 (10-05-23 @ 18:51) (16 - 18)  SpO2: 95% (10-05-23 @ 18:51) (95% - 100%)    Gen - NAD, Comfortable  HEENT - NCAT, EOMI, MMM  Neck - Supple, No limited ROM, O2 via NC  Pulm - diminished across lung field  Cardiovascular - RRR, S1S2  Chest - good chest expansion, good respiratory effort  Abdomen - Soft, NT/ND, +BS  Extremities - No Cyanosis, no clubbing, 2+ edema to b/l feet, no calf tenderness, LUE SL PICC  Neuro-     Cognitive - awake, alert, oriented to person, place, date, year.  Able to follow command     Communication -hypophonic but comprehensible     Attention: Intact     Memory:  memory intact     Cranial Nerves - CN 2-12 intact.     Motor -                    LEFT    UE - 4/5                    RIGHT UE - 4/5                     LEFT    LE - 2 +-/5 limited by leg edema                    RIGHT LE - 2+-/5  limitted by leg edema      Sensory - Intact        Reflexes - DTR 1+      Coordination - FTN  impaired  due to weakness   MSK: soreness and weakness  Psychiatric - Mood stable, Affect WNL  Skin: Left lower abdomen ruptured blister 3.0 x 1.0, stage 2 pressure injury to right buttock 4 x1 and left buttock 3x1, stage 2 pressure injury ti right inner thigh 0.2 x 0.2, right groin wound 10.5 x 2.0, Left groin wound 5 x 5,  right neck scab wound

## 2023-10-05 NOTE — DISCHARGE NOTE PROVIDER - DETAILS OF MALNUTRITION DIAGNOSIS/DIAGNOSES
This patient has been assessed with a concern for Malnutrition and was treated during this hospitalization for the following Nutrition diagnosis/diagnoses:     -  09/26/2023: Severe protein-calorie malnutrition   -  09/14/2023: Morbid obesity (BMI > 40)

## 2023-10-05 NOTE — H&P ADULT - ASSESSMENT
Assessment/Plan:  ALIZA FOLEY is a 62 year old female with PMH of pAFIB and sciatica;      Patient now admitted for a multidisciplinary rehab program. 10-05-23 @ 13:18    -------------    Rehab Management/MEDICAL MANAGEMENT     #Interstitial Lung Disease  - Gait Instability, ADL impairments and Functional impairments: start Comprehensive Rehab Program of PT/OT - 3 hours a day, 5 days a week  - P&O as needed   - Follow up Outpatient with Dr. Whitney   -- Repeat CT Chest in 6 weeks     #pAFIB  - Diagnosed 1 month ago  - No medication regimen  - Continue to monitor     #Sleep  - Melatonin PRN     #Skin  - Skin on admission:   - Pressure injury/Skin: OOB to Chair, PT/OT     #Pain Mgmt   - Tylenol PRN,     #GI/Bowel Mgmt   - Continent c/w Senna, Miralax     #/Bladder Mgmt   -  PVR q8h, CIC>400cc    #FEN   - Diet - Regular + Thins  [DASH/TLC]      #Precautions / PROPHYLAXIS:   - Falls  - ortho: Weight bearing status: WBAT   - Lungs: Aspiration, Incentive Spirometer   - DVT: Lovenox  ---------------------------  OUTPATIENT/FOLLOW UP:    Trae Whitney  Pulmonary Disease  [address], [address] [place] [address]  Phone: [phone]  Fax: [phone]  Follow Up Time: 2 weeks    Ryanne Allen  Rheumatology  [address] [address]  Phone: [phone]  Fax: [phone]  Follow Up Time: 1 week    Kyle Pierce  Internal Medicine  [address] [place] [address], [address]  [place] [address]  Phone: [phone]  Fax: [phone]  Follow Up Time: 2 weeks    [facility]  INTMED Stephenie Villeda  Scheduled Appointment: 09/13/2023  2:40 PM  ---------------------------  MEDICAL PROGNOSIS: GOOD                                   REHAB POTENTIAL: GOOD  ESTIMATED DISPOSITION: HOME                             ELOS: 10-14 Days   EXPECTED THERAPY:     P.T. 2hr/day       O.T. 1hr/day      S.L.P. 0hr/day      EXP FREQUENCY: 5 days per 7 day period     PRESCREEN COMPARISON: I have reviewed the prescreen information and I have found no relevant changes between the preadmission screening and my post admission evaluation     RATIONALE FOR INPATIENT ADMISSION - Patient demonstrates the following: (check all that apply)  [X] Medically appropriate for rehabilitation admission  [X] Has attainable rehab goals with an appropriate initial discharge plan  [X] Has rehabilitation potential (expected to make a significant improvement within a reasonable period of time)   [X] Requires close medical managment by a rehab physician, rehab nursing care, Hospitalist and comprehensive interdisciplinary team (including PT, OT, & or SLP, Prosthetics and Orthotics)     Assessment/Plan:  ALIZA FOLEY is a 62 year old female with PMH of pAFIB and sciatica; who presented to [facility] ED with SOB, and was diagnosed with non-specific interstitial pneumonia. Of note, she has been ruled out for lupus and multiple immunological disorders. While at [facility],  CXR and CTA were significant for ground glass opacities, and interstitial lung disease, respectively. She underwent a bronchoscopy with bronchoalveolar lavage and pulse steroids. On 9/13, she was transferred to [facility] for further management. She was diagnosed with non-specific interstitial pneumonia and was managed on Cellcept and Methylprednisolone.      Patient now admitted for a multidisciplinary rehab program. 10-05-23 @ 13:18    -------------    Rehab Management/MEDICAL MANAGEMENT     #Interstitial Lung Disease  - Gait Instability, ADL impairments and Functional impairments: start Comprehensive Rehab Program of PT/OT - 3 hours a day, 5 days a week  - P&O as needed   - Follow up Outpatient with Dr. Whitney   -- Repeat CT Chest in 6 weeks     #pAFIB  - Diagnosed 1 month ago  - No medication regimen  - Continue to monitor     #Sleep  - Melatonin PRN     #Skin  - Skin on admission:   - Pressure injury/Skin: OOB to Chair, PT/OT     #Pain Mgmt   - Tylenol PRN,     #GI/Bowel Mgmt   - Continent c/w Senna, Miralax     #/Bladder Mgmt   -  PVR q8h, CIC>400cc    #FEN   - Diet - Regular + Thins  [DASH/TLC]      #Precautions / PROPHYLAXIS:   - Falls  - ortho: Weight bearing status: WBAT   - Lungs: Aspiration, Incentive Spirometer   - DVT: Lovenox  ---------------------------  OUTPATIENT/FOLLOW UP:    Trae Whitney  Pulmonary Disease  [address], [address] [place] [address]  Phone: [phone]  Fax: [phone]  Follow Up Time: 2 weeks    Ryanne Allen  Rheumatology  [address] [address]  Phone: [phone]  Fax: [phone]  Follow Up Time: 1 week    Kyle Pierce  Internal Medicine  [address] [place] [address], Floor [address]  [place] [address]  Phone: [phone]  Fax: [phone]  Follow Up Time: 2 weeks    [facility]  [facility] [address]  Scheduled Appointment: 09/13/2023  2:40 PM  ---------------------------  MEDICAL PROGNOSIS: GOOD                                   REHAB POTENTIAL: GOOD  ESTIMATED DISPOSITION: HOME                             ELOS: 10-14 Days   EXPECTED THERAPY:     P.T. 2hr/day       O.T. 1hr/day      S.L.P. 0hr/day      EXP FREQUENCY: 5 days per 7 day period     PRESCREEN COMPARISON: I have reviewed the prescreen information and I have found no relevant changes between the preadmission screening and my post admission evaluation     RATIONALE FOR INPATIENT ADMISSION - Patient demonstrates the following: (check all that apply)  [X] Medically appropriate for rehabilitation admission  [X] Has attainable rehab goals with an appropriate initial discharge plan  [X] Has rehabilitation potential (expected to make a significant improvement within a reasonable period of time)   [X] Requires close medical managment by a rehab physician, rehab nursing care, Hospitalist and comprehensive interdisciplinary team (including PT, OT, & or SLP, Prosthetics and Orthotics)     Assessment/Plan:  ALIZA FOLEY is a 62 year old female with PMH of pAFIB and sciatica; who presented to [facility] ED with SOB, and was found to have groundglass opacities and interstitial lung disease. She was treated with empiric Vancomycin and Zosyn. She underwent a bronchoscopy with bronchoalveolar lavage and pulse steroids. She was given IV diuretics for increased pulmonary congestion, but her respiratory status continued to worsen.  On 9/13, she was transferred to [facility] for ECMO requirements. She was further treated with Plasmapheresis, Rituximab and high-dose steroids, with significant improvement. Her overall hospital course was complicated by thrombocytopenia (s/p several platelet transfusions), leukocytosis (infectious workup entirely negative- s/p Vanco and Ceftriaxone), AFIB with RVR (resolved with Metoprolol), dysphagia (requiring NGT), transaminitis (secondary to acute illness and hypotension),  non-occlusive RIJ DVT (started on Lovenox). Patient now admitted for a multidisciplinary rehab program. 10-05-23 @ 13:18  -------------  Rehab Management/MEDICAL MANAGEMENT     #Interstitial Lung Disease  - Gait Instability, ADL impairments and Functional impairments: start Comprehensive Rehab Program of PT/OT/SLP - 3 hours a day, 5 days a week  - P&O as needed   - Non-specific Interstitial Lung Disease  - Requiring ECMO   - Improvement s/p Plasmapheresis, Rituximab and high- dose steroids   - Follow up Outpatient   -- Repeat CT Chest in 6 weeks     #pAFIB  - Eliquis (for + DVT)     #Transaminitis   - Secondary to acute illness and hypotension at [facility]  - Trend LFTs  - Outpatient follow up     #Sleep  - Melatonin PRN     #Skin  - Skin on admission:   -- MAD to intertringinous- Nystatin to submammary and abdominal pannus BID  -- MAD to L groin- cleanse with NS, Triad cream daily  -- Mad to R groin- Nystatin powder daily   -- R groin wound-- aquacel packing, Triad to periwound, Allevyn foam- daily and PRN   - Pressure injury/Skin: OOB to Chair, PT/OT   - Offload pressure, low airloss support surfaces, T&P every 2 hours     #Pain Mgmt   - Tylenol PRN     #GI/Bowel Mgmt   - Continent c/w Senna, Miralax     #/Bladder Mgmt   -  PVR q8h, CIC>400cc    #FEN   #Dysphagia  - Diet - Regular + Thins  [DASH/TLC]    - Dysphaiga- SLP evaluation     #Precautions / PROPHYLAXIS:   - Falls  - ortho: Weight bearing status: WBAT   - Lungs: Aspiration, Incentive Spirometer   - DVT: Lovenox  - RIJ DVT found at ECMO cannula   ---------------------------  OUTPATIENT/FOLLOW UP:    Trae Whitney  Pulmonary Disease  [address], [address] [place] [address]  Phone: [phone]  Fax: [phone]  Follow Up Time: 2 weeks    Ryanne Allen  Rheumatology  [address] [address]  Phone: [phone]  Fax: [phone]  Follow Up Time: 1 week    Kyle Pierceell  Internal Medicine  [address] [place] [address], Floor 5  [place] [address]  Phone: [phone]  Fax: [phone]  Follow Up Time: 2 weeks    Addy Powers  Pulmonary Disease  410 [facility], [address]  [place], [place] [address]  Phone: [phone]  Fax: [phone]  Follow Up Time:     Kwame Hawley  Critical Care Medicine  410 [facility], [address]  [place] [address]  Phone: [phone]  Fax: [phone]  Follow Up Time:     Neena Borrego  Rheumatology  [address] [place] [address], Floor [address]  [place] [address]  Phone: [phone]  Fax: [phone]  Follow Up Time:    [facility]  INT[address]  Scheduled Appointment: 09/13/2023  2:40 PM  ---------------------------  MEDICAL PROGNOSIS: GOOD                                   REHAB POTENTIAL: GOOD  ESTIMATED DISPOSITION: HOME                             ELOS: 10-14 Days   EXPECTED THERAPY:     P.T. 1hr/day       O.T. 1hr/day      S.L.P. 1hr/day      EXP FREQUENCY: 5 days per 7 day period     PRESCREEN COMPARISON: I have reviewed the prescreen information and I have found no relevant changes between the preadmission screening and my post admission evaluation     RATIONALE FOR INPATIENT ADMISSION - Patient demonstrates the following: (check all that apply)  [X] Medically appropriate for rehabilitation admission  [X] Has attainable rehab goals with an appropriate initial discharge plan  [X] Has rehabilitation potential (expected to make a significant improvement within a reasonable period of time)   [X] Requires close medical managment by a rehab physician, rehab nursing care, Hospitalist and comprehensive interdisciplinary team (including PT, OT, & or SLP, Prosthetics and Orthotics)     Assessment/Plan:  ALIZA FOLEY is a 62 year old female with PMH of pAFIB and sciatica; who presented to [facility] ED with SOB, and was found to have groundglass opacities and interstitial lung disease. She was treated with empiric Vancomycin and Zosyn. She underwent a bronchoscopy with bronchoalveolar lavage and pulse steroids. She was given IV diuretics for increased pulmonary congestion, but her respiratory status continued to worsen.  On 9/13, she was transferred to [facility] for ECMO requirements. She was further treated with Plasmapheresis, Rituximab and high-dose steroids, with significant improvement. Her overall hospital course was complicated by thrombocytopenia (s/p several platelet transfusions), leukocytosis (infectious workup entirely negative- s/p Vanco and Ceftriaxone), AFIB with RVR (resolved with Metoprolol), dysphagia (requiring NGT), transaminitis (secondary to acute illness and hypotension),  non-occlusive RIJ DVT (started on Lovenox). Patient now admitted for a multidisciplinary rehab program. 10-05-23 @ 13:18  -------------  Rehab Management/MEDICAL MANAGEMENT     #Interstitial Lung Disease  - Gait Instability, ADL impairments and Functional impairments: start Comprehensive Rehab Program of PT/OT/SLP - 3 hours a day, 5 days a week  - P&O as needed   - Non-specific Interstitial Lung Disease  - Requiring ECMO   - Improvement s/p Plasmapheresis, Rituximab and high- dose steroids   - Albuterol/Ipratropium Nebulizer every 6 hours  - Mepron 1500mg daily  - Follow up Outpatient   -- Repeat CT Chest in 6 weeks     #pAFIB  - Metoprolol 25mg BID     #Transaminitis   - Secondary to acute illness and hypotension at [facility]  - Trend LFTs  - Outpatient follow up     #Sleep  - Melatonin 6mg at HS    #Skin  - Skin on admission:   -- MAD to skin folds- Nystatin to submammary and abdominal pannus BID  -- MAD to L groin- cleanse with NS, Triad cream daily  -- Mad to R groin- Nystatin powder daily   -- R groin wound-- aquacel packing, Triad to periwound, Allevyn foam- daily and PRN   - Pressure injury/Skin: OOB to Chair, PT/OT   - Offload pressure, low air loss support surfaces, T&P every 2 hours     #Pain Mgmt   - Tylenol PRN     #GI/Bowel Mgmt   - Pantoprazole  - Senna   - PRN: Maalox     #/Bladder Mgmt   -  PVR q8h, CIC>400cc    #FEN   #Dysphagia  - Diet - Minced & Moist  [CC]    - Dysphaiga- SLP evaluation     #Health maintenance  - Folic Acid   - MVI  - Ocean nasal spray    #Precautions / PROPHYLAXIS:   - Falls  - ortho: Weight bearing status: WBAT   - Lungs: Aspiration, Incentive Spirometer   - DVT: Lovenox 70mg BID  - RIJ DVT found at ECMO cannula   ---------------------------  OUTPATIENT/FOLLOW UP:    Trae Whitney  Pulmonary Disease  [address], [address] [place] [address]  Phone: [phone]  Fax: [phone]  Follow Up Time: 2 weeks    Ryanne Allen  Rheumatology  [address] [address]  Phone: [phone]  Fax: [phone]  Follow Up Time: 1 week    Kyle Pierce  Internal Medicine  [address] [place] [address], Floor [address]  [place] [address]  Phone: [phone]  Fax: [phone]  Follow Up Time: 2 weeks    Addy Powers  Pulmonary Disease  410 [facility], [facility] [address]  [place], [place] [address]  Phone: [phone]  Fax: [phone]  Follow Up Time:     Kwame Hawley  Critical Care Medicine  410 [facility], [facility] [address]  [place] [address]  Phone: [phone]  Fax: [phone]  Follow Up Time:     Neena Borrego  Rheumatology  [address] [facility], Floor 3  [place] [address]  Phone: [phone]  Fax: [phone]  Follow Up Time:    [facility]  [facility] 927 [facility]  Scheduled Appointment: 09/13/2023  2:40 PM  ---------------------------  MEDICAL PROGNOSIS: GOOD                                   REHAB POTENTIAL: GOOD  ESTIMATED DISPOSITION: HOME                             ELOS: 10-14 Days   EXPECTED THERAPY:     P.T. 1hr/day       O.T. 1hr/day      S.L.P. 1hr/day      EXP FREQUENCY: 5 days per 7 day period     PRESCREEN COMPARISON: I have reviewed the prescreen information and I have found no relevant changes between the preadmission screening and my post admission evaluation     RATIONALE FOR INPATIENT ADMISSION - Patient demonstrates the following: (check all that apply)  [X] Medically appropriate for rehabilitation admission  [X] Has attainable rehab goals with an appropriate initial discharge plan  [X] Has rehabilitation potential (expected to make a significant improvement within a reasonable period of time)   [X] Requires close medical managment by a rehab physician, rehab nursing care, Hospitalist and comprehensive interdisciplinary team (including PT, OT, & or SLP, Prosthetics and Orthotics)     Assessment/Plan:  ALIZA FOLEY is a 62 year old female with PMH of pAFIB and sciatica; who presented to [facility] ED with SOB, and was found to have groundglass opacities and interstitial lung disease. She was treated with empiric Vancomycin and Zosyn. She underwent a bronchoscopy with bronchoalveolar lavage and pulse steroids. She was given IV diuretics for increased pulmonary congestion, but her respiratory status continued to worsen.  On 9/13, she was transferred to [facility] for ECMO requirements. She was further treated with Plasmapheresis, Rituximab and high-dose steroids, with significant improvement. Her overall hospital course was complicated by thrombocytopenia (s/p several platelet transfusions), leukocytosis (infectious workup entirely negative- s/p Vanco and Ceftriaxone), AFIB with RVR (resolved with Metoprolol), dysphagia (requiring NGT), transaminitis (secondary to acute illness and hypotension),  non-occlusive RIJ DVT (started on Lovenox). Patient now admitted for a multidisciplinary rehab program. 10-05-23 @ 13:18  -------------  Rehab Management/MEDICAL MANAGEMENT     #Interstitial Lung Disease  - Gait Instability, ADL impairments and Functional impairments: start Comprehensive Rehab Program of PT/OT/SLP - 3 hours a day, 5 days a week  - P&O as needed   - Non-specific Interstitial Lung Disease  - Requiring ECMO   - Improvement s/p Plasmapheresis, Rituximab and high- dose steroids   - Albuterol/Ipratropium Nebulizer every 6 hours  - Mepron 1500mg daily  - PO Medrol ( due to liver dysfunction): Plan will be to taper by ~20% every 2 weeks  Medrol 64mg x 2 weeks  56mg x 2 weeks  44mg x 2 weeks   36mg x 2 weeks - Follow up Outpatient   -- Repeat CT Chest in 6 weeks     #pAFIB  - Metoprolol 25mg BID     #Transaminitis   - Secondary to acute illness and hypotension at [facility]  - Trend LFTs  - Outpatient follow up     #Sleep  - Melatonin 6mg at     #Skin  - Skin on admission: Left lower abdomen ruptured blister 3.0 x 1.0, stage 2 pressure injury to right buttock 4 x1 and left buttock 3x1, stage 2 pressure injury ti right inner thigh 0.2 x 0.2, right groin wound 10.5 x 2.0, Left groin wound 5 x 5,  right neck scab wound  -- MAD to skin folds- Nystatin to submammary and abdominal pannus BID  -- MAD to L groin- cleanse with NS, Triad cream daily  -- Mad to R groin- Nystatin powder daily   -- R groin wound-- aquacel packing, Triad to periwound, Allevyn foam- daily and PRN   - Pressure injury/Skin: OOB to Chair, PT/OT   - Offload pressure, low air loss support surfaces, T&P every 2 hours   - f/u wound care consult    #Pain Mgmt   - Tylenol PRN     #GI/Bowel Mgmt   - Pantoprazole  - Senna   - PRN: Maalox     #/Bladder Mgmt   -  PVR q8h, CIC>400cc    #FEN   #Dysphagia  - Diet - Minced & Moist  [CC]    - Dysphaiga- SLP evaluation     #Health maintenance  - Folic Acid   - MVI  - Ocean nasal spray    #Precautions / PROPHYLAXIS:   - Falls  - ortho: Weight bearing status: WBAT   - Lungs: Aspiration, Incentive Spirometer   - DVT: Lovenox 70mg BID  - RIJ DVT found at ECMO cannula   ---------------------------  OUTPATIENT/FOLLOW UP:    Trae Whitney  Pulmonary Disease  [address], [address] [place] [address]  Phone: [phone]  Fax: [phone]  Follow Up Time: 2 weeks    Ryanne Allen  Rheumatology  [address] [address]  Phone: [phone]  Fax: [phone]  Follow Up Time: 1 week    Kyle Pierce  Internal Medicine  [address] [place] [address], Floor [address]  [place] [address]  Phone: [phone]  Fax: [phone]  Follow Up Time: 2 weeks    Addy Powers  Pulmonary Disease  410 [facility], [address]  [place], [place] [address]  Phone: [phone]  Fax: [phone]  Follow Up Time:     Kwame Hawley  Critical Care Medicine  410 [facility], [address]  [place] [address]  Phone: [phone]  Fax: [phone]  Follow Up Time:     Neena Borrego  Rheumatology  [address] [facility], Floor 3  [place] [address]  Phone: [phone]  Fax: [phone]  Follow Up Time:    [facility]  [facility] [address]  Scheduled Appointment: 09/13/2023  2:40 PM  ---------------------------  MEDICAL PROGNOSIS: GOOD                                   REHAB POTENTIAL: GOOD  ESTIMATED DISPOSITION: HOME                             ELOS: 10-14 Days   EXPECTED THERAPY:     P.T. 1hr/day       O.T. 1hr/day      S.L.P. 1hr/day      EXP FREQUENCY: 5 days per 7 day period     PRESCREEN COMPARISON: I have reviewed the prescreen information and I have found no relevant changes between the preadmission screening and my post admission evaluation     RATIONALE FOR INPATIENT ADMISSION - Patient demonstrates the following: (check all that apply)  [X] Medically appropriate for rehabilitation admission  [X] Has attainable rehab goals with an appropriate initial discharge plan  [X] Has rehabilitation potential (expected to make a significant improvement within a reasonable period of time)   [X] Requires close medical managment by a rehab physician, rehab nursing care, Hospitalist and comprehensive interdisciplinary team (including PT, OT, & or SLP, Prosthetics and Orthotics)     Assessment/Plan:  ALIZA FOLEY is a 62 year old female with PMH of pAFIB and sciatica; who presented to [facility] ED with SOB, and was found to have groundglass opacities and interstitial lung disease. She was treated with empiric Vancomycin and Zosyn. She underwent a bronchoscopy with bronchoalveolar lavage and pulse steroids. She was given IV diuretics for increased pulmonary congestion, but her respiratory status continued to worsen.  On 9/13, she was transferred to [facility] for ECMO requirements. She was further treated with Plasmapheresis, Rituximab and high-dose steroids, with significant improvement. Her overall hospital course was complicated by thrombocytopenia (s/p several platelet transfusions), leukocytosis (infectious workup entirely negative- s/p Vanco and Ceftriaxone), AFIB with RVR (resolved with Metoprolol), dysphagia (requiring NGT), transaminitis (secondary to acute illness and hypotension),  non-occlusive RIJ DVT (started on Lovenox). Patient now admitted for a multidisciplinary rehab program. 10-05-23 @ 13:18    * Cardiology consult--evaluation and possible switch from therapeutic lovenox to oral AC agent --hx A fib ( on rate control prior to acute hosp admission--previously declined AC),  and recent Rt IJ thrombus   * Pulmonary consult --Interstitial lung disease with resp failure on Oxy,   * Wound care--Multiple wounds--perineal and buttock/sacral   * Provider pressure offloading gell form to pressure susceptible areas  * Duplex venous ultrasound b/l legs and Rt upper extremity, duplex arterial scan b/l legs  * Discussed Rehab protocol with patient and partner, present at bedside       #Interstitial Lung Disease  - Gait Instability, ADL impairments and Functional impairments: start Comprehensive Rehab Program of PT/OT/SLP - 3 hours a day, 5 days a week  - P&O as needed   - Non-specific Interstitial Lung Disease  - Requiring ECMO   - Improvement s/p Plasmapheresis, Rituximab and high- dose steroids   - Albuterol/Ipratropium Nebulizer every 6 hours  - Mepron 1500mg daily  - PO Medrol ( due to liver dysfunction): Plan will be to taper by ~20% every 2 weeks  Medrol 64mg x 2 weeks  56mg x 2 weeks  44mg x 2 weeks   36mg x 2 weeks - Follow up Outpatient   -- Repeat CT Chest in 6 weeks   --* Pulmonary consult --Interstitial lung disease with resp failure on Oxy,    #pAFIB  - Metoprolol 25mg BID   --* Cardiology consult--evaluation and possible switch from therapeutic lovenox to oral AC agent --hx A fib ( on rate control prior to acute hosp admission--previously declined AC),  and recent Rt IJ thrombus     #Transaminitis   - Secondary to acute illness and hypotension at [facility]  - Trend LFTs  - Outpatient follow up     #Sleep  - Melatonin 6mg at HS    #Skin  - Skin on admission: Left lower abdomen ruptured blister 3.0 x 1.0, stage 2 pressure injury to right buttock 4 x1 and left buttock 3x1, stage 2 pressure injury ti right inner thigh 0.2 x 0.2, right groin wound 10.5 x 2.0, Left groin wound 5 x 5,  right neck scab wound  -- MAD to skin folds- Nystatin to submammary and abdominal pannus BID  -- MAD to L groin- cleanse with NS, Triad cream daily  -- Mad to R groin- Nystatin powder daily   -- R groin wound-- aquacel packing, Triad to periwound, Allevyn foam- daily and PRN   - Pressure injury/Skin: OOB to Chair, PT/OT   - Offload pressure, low air loss support surfaces, T&P every 2 hours   - f/u wound care consult    #Pain Mgmt   - Tylenol PRN     #GI/Bowel Mgmt   - Pantoprazole  - Senna   - PRN: Maalox     #/Bladder Mgmt --voiding     #FEN   #Dysphagia  - Diet - Minced & Moist  [CC]    - Dysphaiga- SLP evaluation     #Health maintenance  - Folic Acid   - MVI  - Ocean nasal spray    #Precautions / PROPHYLAXIS:   - Falls  - ortho: Weight bearing status: WBAT   - Lungs: Aspiration, Incentive Spirometer   - DVT: Lovenox 70mg BID  - RIJ DVT found at ECMO cannula   ---------------------------  OUTPATIENT/FOLLOW UP:    Trae Whitney  Pulmonary Disease  [address], [address] [place] [address]  Phone: [phone]  Fax: [phone]  Follow Up Time: 2 weeks    Ryanne Allen  Rheumatology  [address] [address]  Phone: [phone]  Fax: [phone]  Follow Up Time: 1 week    Kyle Pierce  Internal Medicine  [address] [place] [address], Floor 5  [place] [address]  Phone: [phone]  Fax: [phone]  Follow Up Time: 2 weeks    Addy Powers  Pulmonary Disease  [address] [facility], [address]  [place], [place] [address]  Phone: [phone]  Fax: [phone]  Follow Up Time:     Kwame Hawley  Critical Care Medicine  [address] [facility], [address]  [place] [address]  Phone: [phone]  Fax: [phone]  Follow Up Time:     Neena Borrego  Rheumatology  [address] [facility], [address] 3  [place] [address]  Phone: [phone]  Fax: [phone]  Follow Up Time:    [facility]  [address]  Scheduled Appointment: 09/13/2023  2:40 PM  ---------------------------  MEDICAL PROGNOSIS: GOOD                                   REHAB POTENTIAL: GOOD  ESTIMATED DISPOSITION: HOME                             ELOS: 10-14 Days   EXPECTED THERAPY:     P.T. 1hr/day       O.T. 1hr/day      S.L.P. 1hr/day      EXP FREQUENCY: 5 days per 7 day period     PRESCREEN COMPARISON: I have reviewed the prescreen information and I have found no relevant changes between the preadmission screening and my post admission evaluation     RATIONALE FOR INPATIENT ADMISSION - Patient demonstrates the following: (check all that apply)  [X] Medically appropriate for rehabilitation admission  [X] Has attainable rehab goals with an appropriate initial discharge plan  [X] Has rehabilitation potential (expected to make a significant improvement within a reasonable period of time)   [X] Requires close medical managment by a rehab physician, rehab nursing care, Hospitalist and comprehensive interdisciplinary team (including PT, OT, & or SLP, Prosthetics and Orthotics)     Assessment/Plan:  ALIZA FOLEY is a 62 year old female with PMH of pAFIB and sciatica; who presented to [facility] ED with SOB, and was found to have groundglass opacities and interstitial lung disease. She was treated with empiric Vancomycin and Zosyn. She underwent a bronchoscopy with bronchoalveolar lavage and pulse steroids. She was given IV diuretics for increased pulmonary congestion, but her respiratory status continued to worsen.  On 9/13, she was transferred to [facility] for ECMO requirements. She was further treated with Plasmapheresis, Rituximab and high-dose steroids, with significant improvement. Her overall hospital course was complicated by thrombocytopenia (s/p several platelet transfusions), leukocytosis (infectious workup entirely negative- s/p Vanco and Ceftriaxone), AFIB with RVR (resolved with Metoprolol), dysphagia (requiring NGT), transaminitis (secondary to acute illness and hypotension),  non-occlusive RIJ DVT (started on Lovenox). Patient now admitted for a multidisciplinary rehab program. 10-05-23 @ 13:18    * Cardiology consult--evaluation and possible switch from therapeutic lovenox to oral AC agent --hx A fib ( on rate control prior to acute hosp admission--previously declined AC),  and recent Rt IJ thrombus   * Pulmonary consult --Interstitial lung disease with resp failure on Oxy,   * Wound care--Multiple wounds--perineal and buttock/sacral   * Provider pressure offloading gell form to pressure susceptible areas  * Duplex venous ultrasound b/l legs and Rt upper extremity, duplex arterial scan b/l legs  * Discussed Rehab protocol with patient and partner, present at bedside       #Interstitial Lung Disease  - Gait Instability, ADL impairments and Functional impairments: start Comprehensive Rehab Program of PT/OT/SLP - 3 hours a day, 5 days a week  - P&O as needed   - Non-specific Interstitial Lung Disease  - Requiring ECMO   - Improvement s/p Plasmapheresis, Rituximab and high- dose steroids   - Albuterol/Ipratropium Nebulizer every 6 hours  - Mepron 1500mg daily  - PO Medrol ( due to liver dysfunction): Plan will be to taper by ~20% every 2 weeks  Medrol 64mg x 2 weeks  56mg x 2 weeks  44mg x 2 weeks   36mg x 2 weeks - Follow up Outpatient   -- Repeat CT Chest in 6 weeks   --* Pulmonary consult --Interstitial lung disease with resp failure on Oxy,    #pAFIB  - Metoprolol 25mg BID   --* Cardiology consult--evaluation and possible switch from therapeutic lovenox to oral AC agent --hx A fib ( on rate control prior to acute hosp admission--previously declined AC),  and recent Rt IJ thrombus     # Lymphedema both legs -chronic and Rt IJ thrombus  * Duplex venous ultrasound b/l legs and Rt upper extremity, duplex arterial scan b/l legs    #Transaminitis   - Secondary to acute illness and hypotension at J  - Trend LFTs  - Outpatient follow up     #Sleep  - Melatonin 6mg at HS    #Skin  - Skin: Left lower abdomen ruptured blister 3.0 x 1.0, stage 2 pressure injury to right buttock 4 x1 and left buttock 3x1, stage 2 pressure injury ti right inner thigh 0.2 x 0.2, right groin wound 10.5 x 2.0, Left groin wound 5 x 5,  right neck scab wound  -- MAD to skin folds- Nystatin to submammary and abdominal pannus BID  -- MAD to L groin- cleanse with NS, Triad cream daily  -- Mad to R groin- Nystatin powder daily   -- R groin wound-- aquacel packing, Triad to periwound, Allevyn foam- daily and PRN   - Pressure injury/Skin: OOB to Chair, PT/OT   - Offload pressure, low air loss support surfaces, T&P every 2 hours   --Wound care consult--Multiple wounds--perineal and buttock/sacral        #Pain Mgmt   - Tylenol PRN     #GI/Bowel Mgmt   - Pantoprazole  - Senna   - PRN: Maalox     #/Bladder Mgmt --voiding     #FEN   #Dysphagia  - Diet - Minced & Moist  [CC]    - Dysphaiga- SLP evaluation     #Health maintenance  - Folic Acid   - MVI  - Ocean nasal spray    #Precautions / PROPHYLAXIS:   - Falls  - ortho: Weight bearing status: WBAT   - Lungs: Aspiration, Incentive Spirometer   - DVT: Lovenox 70mg BID  - RIJ DVT found at ECMO cannula   ---------------------------  Liaison with family  Patient's partner present during review, details of review d/w her, detailed explanation of therapy protocol explained and she was happy with same    Liaison with providers  consults ordered--cardiac, pulmonary, wound care    OUTPATIENT/FOLLOW UP:    Yasmany Whitneyl  Pulmonary Disease  [address], [address] [place] [address]  Phone: [phone]  Fax: [phone]  Follow Up Time: 2 weeks    Ryanne Allen  Rheumatology  [address] [address]  Phone: [phone]  Fax: [phone]  Follow Up Time: 1 week    Kyle Pierce  Internal Medicine  [address] [place] [address], [address] 5  [place] [address]  Phone: [phone]  Fax: [phone]  Follow Up Time: 2 weeks    Addy Powers  Pulmonary Disease  [address] [id number]  Phone: [phone]  Fax: [phone]  Follow Up Time:     Kwame Hawley  Critical Care Medicine  [address] [facility] [address]  [place] [address]  Phone: [phone]  Fax: [phone]  Follow Up Time:     Neena Borrego  Rheumatology  [address] [facility], Floor 3  [place] [address]  Phone: [phone]  Fax: [phone]  Follow Up Time:    Chacho Dumont  [facility]  [facility]  Scheduled Appointment: 09/13/2023  2:40 PM  ---------------------------  MEDICAL PROGNOSIS: GOOD                                   REHAB POTENTIAL: GOOD  ESTIMATED DISPOSITION: HOME                             ELOS: 10-14 Days   EXPECTED THERAPY:     P.T. 1hr/day       O.T. 1hr/day      S.L.P. 1hr/day      EXP FREQUENCY: 5 days per 7 day period     PRESCREEN COMPARISON: I have reviewed the prescreen information and I have found no relevant changes between the preadmission screening and my post admission evaluation     RATIONALE FOR INPATIENT ADMISSION - Patient demonstrates the following: (check all that apply)  [X] Medically appropriate for rehabilitation admission  [X] Has attainable rehab goals with an appropriate initial discharge plan  [X] Has rehabilitation potential (expected to make a significant improvement within a reasonable period of time)   [X] Requires close medical managment by a rehab physician, rehab nursing care, Hospitalist and comprehensive interdisciplinary team (including PT, OT, & or SLP, Prosthetics and Orthotics)

## 2023-10-05 NOTE — PROGRESS NOTE ADULT - SUBJECTIVE AND OBJECTIVE BOX
[facility]-- WOUND TEAM -- FOLLOW UP NOTE  --------------------------------------------------------------------------------    subjective: Patient seen while sitting up in chair with partner at bedside. Per patient her mouth and nose feels dry; otherwise no complaints of pain. Denies CP, SOB, fever, chills, n/v. Has been able to eat since previously seen by wound care.     Interval HPI/24 hour events:   -Left groin suture removed by thoracic surgery  -plan for discharge today.      Chart reviewed including labs and relevant images      Diet:  Diet, Minced and Moist:   Consistent Carbohydrate No Snacks (CSTCHO) (10-02-23 @ 13:00) [Active]      ROS: General, HEENT see above.  All other systems negative.    ALLERGIES & MEDICATIONS  --------------------------------------------------------------------------------  Allergies    No Known Allergies    Intolerances          STANDING INPATIENT MEDICATIONS    albuterol/ipratropium for Nebulization 3 milliLiter(s) Nebulizer every 6 hours  artificial  tears Solution 1 Drop(s) Both EYES every 12 hours  atovaquone  Suspension 1500 milliGRAM(s) Oral daily  chlorhexidine 2% Cloths 1 Application(s) Topical <User Schedule>  dextrose 5%. 1000 milliLiter(s) IV Continuous <Continuous>  dextrose 50% Injectable 12.5 Gram(s) IV Push once  dextrose 50% Injectable 25 Gram(s) IV Push once  dextrose 50% Injectable 12.5 Gram(s) IV Push once  dextrose 50% Injectable 25 Gram(s) IV Push once  enoxaparin Injectable 70 milliGRAM(s) SubCutaneous <User Schedule>  folic acid 1 milliGRAM(s) Oral daily  glucagon  Injectable 1 milliGRAM(s) IntraMuscular once  insulin lispro (ADMELOG) corrective regimen sliding scale   SubCutaneous Before meals and at bedtime  melatonin 6 milliGRAM(s) Oral at bedtime  methylPREDNISolone sodium succinate Injectable 70 milliGRAM(s) IV Push daily  metoprolol tartrate 25 milliGRAM(s) Oral two times a day  multivitamin/minerals/iron Oral Solution (CENTRUM) 15 milliLiter(s) Oral daily  pantoprazole    Tablet 40 milliGRAM(s) Oral before breakfast  senna Syrup 10 milliLiter(s) Oral at bedtime      PRN INPATIENT MEDICATION  aluminum hydroxide/magnesium hydroxide/simethicone Suspension 30 milliLiter(s) Oral every 4 hours PRN  dextrose Oral Gel 15 Gram(s) Oral once PRN  diphenhydrAMINE Injectable 50 milliGRAM(s) IV Push once PRN  sodium chloride 0.65% Nasal 1 Spray(s) Both Nostrils every 8 hours PRN        Vital signs:  T(C): 37.1 (10-05-23 @ 08:00), Max: 37.1 (10-05-23 @ 08:00)  HR: 75 (10-05-23 @ 08:00) (74 - 85)  BP: 121/71 (10-05-23 @ 08:00) (101/57 - 121/71)  RR: 18 (10-05-23 @ 08:00) (18 - 18)  SpO2: 97% (10-05-23 @ 08:00) (97% - 100%)  Wt(kg): 120kg (10-02-23)        10-04-23 @ 07:01  -  10-05-23 @ 07:00  --------------------------------------------------------  IN: 0 mL / OUT: 800 mL / NET: -800 mL      Constitutional: A&Ox4, sitting in recliner chair.  Obese, chair/bedbound. Requires full assist with positioning and transferring.  (+) low airloss support surface, (+) fluidized positioning devices, heels elevated  HEENT:  NC/AT, (+) glasses, (+) sclera jaundiced, mucosa moist; no longer friable. Hypophonic.  Right neck intact stable scab; no active bleeding noted.  Cardiovascular: rate regular  Respiratory: Supplemental oxygen via nasal cannula, nonlabored, equal chest expansion.   Gastrointestinal: soft, nondistended/nontender. No palpable abnormalities noted.  Large abdominal pannus, increased moisture beneath intertriginous fold; scattered areas of ecchymosis without hematoma; see skin.  Musculoskeletal: weak, no deformities or contractures.  Vascular: BL UE equally warm, capillary refill < 3 seconds, +2 radial pulses.  BLE equally warm, capillary refill < 3 seconds, trace edema bilaterally, +2 dp pulses.  Skin: b/l ue with scattered areas of ecchymosis without hematoma.  Increased moisture within intertriginous folds including bilateral submammary, abdominal pannus, bilateral groin.  Bilateral submammary skin intact, abdominal pannus denuded epidermis to right side. Bilateral groin see below.  Right groin- previous cannulation site  -wound within intertriginous fold of groin exposed to increased moisture  -2.5cmx8.5cmx2.5cm (prev 2.5cmx8.5cmx0.3cm)- deepest point where suture was removed from; did not probe further c/f vessel disruption   -Tissue type 80% tan-grey adherent slough versus biofilm; 20% red-moist viable adipose tissue.  -Area nonfluctuant, no induration, no crepitus, no palpable drainable collection.  -Scant serosanguinous drainage, no odor.  -Periwound skin without erythema, edema, increased warmth/no associated cellulitis; no obvious s/s of candidiasis.  Left groin- moisture associated dermatitis with denuded epidermis  -Superficial wound within intertriginous fold of groin exposed to increased moisture  -0.5cmx2.5cmx0.2cm (prev 0.5hgy3drd2.2cm)  -Tissue type 100% pink dermis with thin layer of translucent yellow-fibrinous film.  -Area nonfluctuant, no induration, no crepitus, no drainage, no palpable drainable collection.  -Periwound skin without erythema, edema, increased warmth/no associated cellulitis; no obvious s/s of candidiasis.  -No drainage noted; no culture taken  **Of note skin exam limited; unable to asses posterior trunk, sacrum, buttocks; sitting in recliner chair, unable to bear weight to stand for exam; unable to safely turn for assessment while in chair.      LABS/ CULTURES/ RADIOLOGY:              9.9    16.60 >-----------<  260      [10-05-23 @ 05:41]              31.8     135  |  98  |  24  ----------------------------<  95      [10-05-23 @ 05:41]  4.1   |  22  |  0.38        Ca     9.3     [10-05-23 @ 05:41]      Mg     2.10     [10-05-23 @ 05:41]      Phos  2.9     [10-05-23 @ 05:41]    TPro  5.4  /  Alb  3.0  /  TBili  8.5  /  DBili  6.0  /  AST  110  /  ALT  173  /  AlkPhos  533  [10-05-23 @ 05:41]        CAPILLARY BLOOD GLUCOSE      POCT Blood Glucose.: 277 mg/dL (05 Oct 2023 11:39)  POCT Blood Glucose.: 138 mg/dL (05 Oct 2023 07:51)  POCT Blood Glucose.: 110 mg/dL (04 Oct 2023 21:14)  POCT Blood Glucose.: 221 mg/dL (04 Oct 2023 16:46)        A1C with Estimated Average Glucose Result: 6.1 % (08-27-23 @ 05:30)        Triglycerides, Serum: 199 mg/dL (09-25-23 @ 00:45)  Triglycerides, Serum: 198 mg/dL (09-24-23 @ 03:15)  Triglycerides, Serum: 162 mg/dL (09-23-23 @ 00:19)  Triglycerides, Serum: 184 mg/dL (09-22-23 @ 00:15)  Triglycerides, Serum: 120 mg/dL (09-21-23 @ 04:15)

## 2023-10-05 NOTE — PROGRESS NOTE ADULT - NS ATTEND AMEND GEN_ALL_CORE FT
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 with acute hypoxemic respiratory failure that initially responded to steroids, then with worsening after taper with development of acute hypoxemic and hypercapnic respiratory failure requiring intubation and VV- ECMO on 9/13, then transferred to [facility], concerning for DAH vs ILD flare, decannulated from VV-ECMO 9/21, extubated 9/22. S/p pulse steroids, PLEX (9/15, 9/18, 9/20) and Rituximab (9/16 & 10/3). Course c/b severe leukopenia s/p filgastrim, shock liver with slow to resolved hyperbilirubinemia, RIJ DVT, oropharyngeal dysphagia, and recurrent SVT. Repeat CT chest on 9/30 with markedly improved bilateral groundglass opacities with resolved lung consolidations.    - Awake and alert, following commands. Physical therapy, out of bed to chair. PM&R eval, for acute rehab  - HD stable, continue metoprolol  - Stable respiratory status, on NC@6 LPM with SpO2 99%. Decrease to 4 LPM at rest with goal SpO2>90%. Would increase to 6 LPM with activity  - Humidified NC and ocean saline nasal spray given recent epistaxis  - Continue bronchodilators and chest PT  - Continue methylprednisolone 80 mg IV daily with very slow taper  - Planned for rituximab infusion today  - S/p PLEX during MICU stay  - Follow-up speech and swallow. On minced and moist diet. Improved phonation. Monitor liver enzymes, trend hyperbilirubinemia  - No evidence of active infection, observe off antibiotics  - Continue atovaquone for PCP prophylaxis  - Normal kidney function and lytes  - Continue therapeutic enoxaparin for RIJ DVT, monitor Factor Xa levels. Prefers to stay on enoxaparin rather than switch to apixaban for now  - Hypoglycemia this morning, now resolved. Hold Lantus. Continue insulin coverage scale  - Discussed with patient and partner at bedside, dispo planning to acute rehab
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 S/p pulse steroids, PLEX (9/15, 9/18, 9/20) and Rituximab (9/16 & 10/3). Course c/b severe leukopenia s/p filgastrim, shock liver with slow to resolved hyperbilirubinemia, RIJ DVT, oropharyngeal dysphagia, and recurrent SVT. Repeat CT chest on 9/30 with markedly improved bilateral groundglass opacities with resolved lung consolidations.    - Awake and alert, following commands. Physical therapy, out of bed to chair. PM&R eval, for acute rehab  - HD stable, continue metoprolol  - Stable respiratory status, on NC@2-4 LPM at rest, increase to 5-6 LPM with activity. Goal SpO2>90%  - Humidified NC and ocean saline nasal spray given recent epistaxis  - Continue bronchodilators and chest PT  - Decrease steroids to methylprednisolone 70 mg IV daily with very slow taper  - Would d/c to rehab on prednisone 80 mg daily and decrease by 10 mg every week with close outpatient follow-up  - Continue atovaquone for PCP prophylaxis  - S/p Rituximab 9/16 and 10/3  - S/p PLEX during MICU stay  - Follow-up speech and swallow. On minced and moist diet. Improved phonation. Monitor liver enzymes, trend hyperbilirubinemia  - No evidence of active infection, observe off antibiotics  - Normal kidney function and lytes  - Continue therapeutic enoxaparin for RIJ DVT. Decrease enoxaparin to 70 mg q12h given high Factor Xa  - She prefers to stay on enoxaparin rather than switch to apixaban for now  - D/c planning to acute rehab with close outpatient pulmonary follow-up. Discussed with patient and partner Kiara at bedside
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 S/p pulse steroids, PLEX (9/15, 9/18, 9/20) and Rituximab (9/16 & 10/3). Course c/b severe leukopenia s/p filgastrim, shock liver with slow to resolved hyperbilirubinemia, RIJ DVT, oropharyngeal dysphagia, and recurrent SVT. Repeat CT chest on 9/30 with markedly improved bilateral groundglass opacities with resolved lung consolidations.    - Awake and alert, following commands. Physical therapy, out of bed to chair. PM&R eval, for acute rehab  - HD stable, continue metoprolol. Hold midodrine  - Stable respiratory status, on NC@2-4 LPM at rest, increase to 5-6 LPM with activity. Goal SpO2>90%  - Humidified NC and ocean saline nasal spray given recent epistaxis  - Continue methylprednisolone 70 mg IV daily while inpatient. Consider changing to prednisone 80 mg daily upon discharge and decrease by 10 mg every week with close outpatient follow-up. Follow-up rheum recs re: taper  - Continue atovaquone for PCP prophylaxis  - S/p Rituximab 9/16 and 10/3  - S/p PLEX during MICU stay  - Continue minced and moist diet. Improved phonation. Monitor liver enzymes, trend hyperbilirubinemia  - No evidence of active infection, observe off antibiotics  - Normal kidney function and lytes  - Continue therapeutic enoxaparin for RIJ DVT. Will need monitoring of Factor Xa levels while on enoxaparin therapy given obesity. Consider switch to apixaban for long-term a/c. Will need at least 3 months of therapy for provoked RIJ DVT due to ECMO cannula  - Wound care for right groin  - D/c planning to acute rehab today with close outpatient pulmonary follow-up. Discussed with patient and partner Kiara at bedside
Agree with above. Seen and examined with team on rounds. Critically ill requiring frequent bedside visits. S/p ECMO for respiratory failure. Improving overall. Continue aggressive PT/OT. On ceftriaxone for possible UTI, finishing day 6 today. Will stop abx today. Will discuss plan for immunosuppressants with rheumatology. Continue lovenox for DVT. Supportive care. Family at the bedside and updated.

## 2023-10-05 NOTE — DISCHARGE NOTE PROVIDER - NSDCFUADDAPPT_GEN_ALL_CORE_FT
You should receive a call to schedule appointments with the doctors listed above, if not their numbers are listed above. You should see them (and a primary care doctor) within 2 weeks of discharge from rehab.

## 2023-10-05 NOTE — DISCHARGE NOTE PROVIDER - CARE PROVIDER_API CALL
Addy Powers  Pulmonary Disease  [address] [facility], [address]  [place], [place] [address]  Phone: [phone]  Fax: [phone]  Follow Up Time:     Kwame Hawley  Critical Care Medicine  [address] [facility] [address]  [place] [address]  Phone: [phone]  Fax: [phone]  Follow Up Time:     Neena Borrego  Rheumatology  [address] [place] [address], [address]  [place] [address]  Phone: [phone]  Fax: [phone]  Follow Up Time:

## 2023-10-05 NOTE — H&P ADULT - HISTORY OF PRESENT ILLNESS
Zo Hager is a 62 year old female with PMH of pAFIB and sciatica; who presented to [facility] ED with SOB. She has been experiencing chronic SOB and non-productive cough for several months and was worked up in [place] where she had a CT scan which resulted negative.  She has been evaluated by Pulmonology and Rheumatology and was ruled out for lupus and immunological disorders.  She recently went to Urgent Care due to SOB with ambulating short distances (12-15 feet), and  and an XR was concerning for BL LL infiltrates.  Zo Hager is a 62 year old female with PMH of pAFIB and sciatica; who presented to [facility] ED with SOB. She has been experiencing chronic SOB and non-productive cough for several months and was worked up in [place] where she had a CT scan which resulted negative.  She has been evaluated by Pulmonology and Rheumatology and was ruled out for lupus and immunological disorders.  She recently went to Urgent Care due to SOB with ambulating short distances (12-15 feet), and  and an XR was concerning for BL LL infiltrates. While at [facility],  CXR and CTA were significant for ground glass opacities, and interstitial lung disease, respectively. She underwent a bronchoscopy with bronchoalveolar lavage and pulse steroids. On 9/13, she was transferred to [facility] for further management. She was diagnosed with non-specific interstitial pneumonia and was managed on Cellcept and Methylprednisolone. PM&R was consulted and deemed her an appropriate candidate for IRF. She was medically stabilized and cleared for discharge to [facility].  Zo Hager is a 62 year old female with PMH of pAFIB and sciatica; who presented to [facility] ED with SOB. She has been experiencing chronic SOB and non-productive cough for several months and was worked up in [place] where she had a CT scan which resulted negative.  She has been evaluated by Pulmonology and Rheumatology and was ruled out for lupus and immunological disorders.  She recently went to Urgent Care due to SOB with ambulating short distances (12-15 feet), and an XR was concerning for BL LL infiltrates. While at [facility],  CXR and CTA were significant for ground glass opacities, and interstitial lung disease, respectively. She was treated with empiric Vancomycin and Zosyn. She underwent a bronchoscopy with bronchoalveolar lavage and pulse steroids. She was given IV diuretics for increased pulmonary congestion, but her respiratory status continued to worsen.  On 9/13, she was transferred to [facility] for ECMO requirements. She was further treated with Plasmapheresis, Rituximab and high-dose steroids, with significant improvement.   Her overall hospital course was complicated by thrombocytopenia (s/p several platelet transfusions), leukocytosis (infectious workup entirely negative- s/p Vanco and Ceftriaxone), AFIB with RVR (resolved with Metoprolol), dysphagia (requiring NGT), transaminitis (secondary to acute illness and hypotension),  non-occlusive RIJ DVT (started on Lovenox). PM&R was consulted and deemed her an appropriate candidate for IRF. She was medically stabilized and cleared for discharge to [facility].

## 2023-10-05 NOTE — DISCHARGE NOTE PROVIDER - NSDCCPCAREPLAN_GEN_ALL_CORE_FT
PRINCIPAL DISCHARGE DIAGNOSIS  Diagnosis: Interstitial lung disease  Assessment and Plan of Treatment: You came into the hospital for trouble breathing and were found to have inflammation in your lungs. 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 The condition worsened to the point where you needed a ventilator and then ECMO to get oxygen into your body and carbon dioxide out. You were treated with plasmapherisis which is a procedure that removed your antibodies (which were attacking your lungs), rituximab (a medication to suppress your immune system) and high-dose steroids. Your condition improved greatly and your lung function improved to the point where you no longer needed ECMO or the ventilator. While your condition has improved you are not cured of this disease and will need to continue following up with a lung doctor and a rheumatologist (doctor for auto-immune diseases) to keep the disease under control and keep you out of the hospital. For now you will be continued on steroids and we will reduce to dose slowly to prevent a relapse of the disease.      SECONDARY DISCHARGE DIAGNOSES  Diagnosis: Personal history of ECMO  Assessment and Plan of Treatment: Your lung disease was so severe that you required ECMO (extra-corporeal membrane oxygenation). This is a device that drains blood from your body, puts oxygen in and removes carbon dioxide. This is a very intense procedure which required surgery to place catheters in the neck and groin and then to remove them as well as very close monitoring. The sutures were removed while you were in the RCU. Please follow up with Dr. Hawley who will monitor your post-ECMO course.    Diagnosis: Dysphagia  Assessment and Plan of Treatment: You were having trouble swallowing after your period of critical illness (this is very common). You briefly required a nasal feeding tube but then passed a swallow evalution allowing you to eat a minced and moist diet. As you get stronger you will be able to eat regular foods.    Diagnosis: Liver enzyme elevation  Assessment and Plan of Treatment: Your liver markers were elevated so we did imaging tests and consulted with the liver doctors. It was felt that these elevations were due to your acute illness and low blood pressure in the time before you were transferred to [facility]. You should have your liver numbers rechecked at rehab and with your primary care doctor when you get home.    Diagnosis: Paroxysmal atrial fibrillation  Assessment and Plan of Treatment: You had episodes of fast heart rate including rapid atrial fibrillation. Please continue taking your beta-blockers to control your heart rate. You should probably also be on blood thinners long term and should discuss with your outpatient doctors.    Diagnosis: Deep vein thrombosis (DVT)  Assessment and Plan of Treatment: You had a small blood clot in a vein in your neck at the site where the ECMO cannula was. You're currently on a blood thinner and should remain on it for at least three months. After this time you can discuss whether or not it can be stopped (although with your atrial fibrillation it might be best to continue).

## 2023-10-05 NOTE — PROGRESS NOTE ADULT - SUBJECTIVE AND OBJECTIVE BOX
ALIZA FOLEY  [id number]    INTERVAL HPI/OVERNIGHT EVENTS:        Objective   Vital Signs Last 24 Hrs  T(C): 37.1 (05 Oct 2023 08:00), Max: 37.1 (05 Oct 2023 08:00)  T(F): 98.8 (05 Oct 2023 08:00), Max: 98.8 (05 Oct 2023 08:00)  HR: 75 (05 Oct 2023 08:00) (74 - 87)  BP: 121/71 (05 Oct 2023 08:00) (101/57 - 121/71)  BP(mean): --  RR: 18 (05 Oct 2023 08:00) (18 - 18)  SpO2: 97% (05 Oct 2023 08:00) (97% - 100%)    Parameters below as of 05 Oct 2023 08:00  Patient On (Oxygen Delivery Method): nasal cannula  O2 Flow (L/min): 2      Physical Exam:  General: NAD  HEENT: EOMI, MMM  MSK: no synovitis   Neuro: AAOx3  muscle strength 1/5 RUE LUE; 2/5RLE LLE       LABS:  cret                        9.9    16.60 )-----------( 260      ( 05 Oct 2023 05:41 )             31.8     10-05    135  |  98  |  24<H>  ----------------------------<  95  4.1   |  22  |  0.38<L>    Ca    9.3      05 Oct 2023 05:41  Phos  2.9     10-05  Mg     2.10     10-05    TPro  5.4<L>  /  Alb  3.0<L>  /  TBili  8.5<H>  /  DBili  6.0<H>  /  AST  110<H>  /  ALT  173<H>  /  AlkPhos  533<H>  10-05          RADIOLOGY & ADDITIONAL TESTS:   ALIZA FOLEY  [id number]    INTERVAL HPI/OVERNIGHT EVENTS:  no major events overnight. No acute complaints, plan is to be discharged to [facility].       Objective   Vital Signs Last 24 Hrs  T(C): 37.1 (05 Oct 2023 08:00), Max: 37.1 (05 Oct 2023 08:00)  T(F): 98.8 (05 Oct 2023 08:00), Max: 98.8 (05 Oct 2023 08:00)  HR: 75 (05 Oct 2023 08:00) (74 - 87)  BP: 121/71 (05 Oct 2023 08:00) (101/57 - 121/71)  BP(mean): --  RR: 18 (05 Oct 2023 08:00) (18 - 18)  SpO2: 97% (05 Oct 2023 08:00) (97% - 100%)    Parameters below as of 05 Oct 2023 08:00  Patient On (Oxygen Delivery Method): nasal cannula  O2 Flow (L/min): 2      Physical Exam:  General: NAD  HEENT: EOMI, MMM  MSK: no synovitis   Neuro: AAOx3  muscle strength 1/5 RUE LUE; 2/5RLE LLE       LABS:  cret                        9.9    16.60 )-----------( 260      ( 05 Oct 2023 05:41 )             31.8     10-05    135  |  98  |  24<H>  ----------------------------<  95  4.1   |  22  |  0.38<L>    Ca    9.3      05 Oct 2023 05:41  Phos  2.9     10-05  Mg     2.10     10-05    TPro  5.4<L>  /  Alb  3.0<L>  /  TBili  8.5<H>  /  DBili  6.0<H>  /  AST  110<H>  /  ALT  173<H>  /  AlkPhos  533<H>  10-05          RADIOLOGY & ADDITIONAL TESTS:

## 2023-10-05 NOTE — PROGRESS NOTE ADULT - SUBJECTIVE AND OBJECTIVE BOX
CHIEF COMPLAINT: Patient is a 62y old  Female who presents with a chief complaint of VV ECMO (04 Oct 2023 08:07)      Interval Events:    REVIEW OF SYSTEMS:  [ ] All other systems negative  [ ] Unable to assess ROS because ________          OBJECTIVE:  ICU Vital Signs Last 24 Hrs  T(C): 36.3 (04 Oct 2023 21:08), Max: 36.3 (04 Oct 2023 21:08)  T(F): 97.3 (04 Oct 2023 21:08), Max: 97.3 (04 Oct 2023 21:08)  HR: 78 (04 Oct 2023 21:56) (74 - 87)  BP: 101/57 (04 Oct 2023 21:08) (101/57 - 128/73)  BP(mean): --  ABP: --  ABP(mean): --  RR: 18 (04 Oct 2023 21:08) (18 - 19)  SpO2: 99% (04 Oct 2023 21:56) (95% - 100%)    O2 Parameters below as of 04 Oct 2023 21:08  Patient On (Oxygen Delivery Method): nasal cannula  O2 Flow (L/min): 2            10-04 @ 07:01  -  10-05 @ 07:00  --------------------------------------------------------  IN: 0 mL / OUT: 800 mL / NET: -800 mL      CAPILLARY BLOOD GLUCOSE      POCT Blood Glucose.: 110 mg/dL (04 Oct 2023 21:14)      HOSPITAL MEDICATIONS:  MEDICATIONS  (STANDING):  acetaminophen     Tablet .. 975 milliGRAM(s) Oral once  albuterol/ipratropium for Nebulization 3 milliLiter(s) Nebulizer every 6 hours  artificial  tears Solution 1 Drop(s) Both EYES every 12 hours  atovaquone  Suspension 1500 milliGRAM(s) Oral daily  chlorhexidine 2% Cloths 1 Application(s) Topical <User Schedule>  dextrose 5%. 1000 milliLiter(s) (50 mL/Hr) IV Continuous <Continuous>  dextrose 50% Injectable 12.5 Gram(s) IV Push once  dextrose 50% Injectable 25 Gram(s) IV Push once  dextrose 50% Injectable 12.5 Gram(s) IV Push once  dextrose 50% Injectable 25 Gram(s) IV Push once  enoxaparin Injectable 70 milliGRAM(s) SubCutaneous <User Schedule>  folic acid 1 milliGRAM(s) Oral daily  glucagon  Injectable 1 milliGRAM(s) IntraMuscular once  insulin lispro (ADMELOG) corrective regimen sliding scale   SubCutaneous Before meals and at bedtime  melatonin 6 milliGRAM(s) Oral at bedtime  methylPREDNISolone sodium succinate Injectable 70 milliGRAM(s) IV Push daily  metoprolol tartrate 25 milliGRAM(s) Oral two times a day  midodrine 10 milliGRAM(s) Oral every 8 hours  multivitamin/minerals/iron Oral Solution (CENTRUM) 15 milliLiter(s) Oral daily  pantoprazole  Injectable 40 milliGRAM(s) IV Push daily  senna Syrup 10 milliLiter(s) Oral at bedtime    MEDICATIONS  (PRN):  aluminum hydroxide/magnesium hydroxide/simethicone Suspension 30 milliLiter(s) Oral every 4 hours PRN Dyspepsia  dextrose Oral Gel 15 Gram(s) Oral once PRN Blood Glucose LESS THAN 70 milliGRAM(s)/deciliter  diphenhydrAMINE Injectable 50 milliGRAM(s) IV Push once PRN REACTION  meperidine     Injectable 25 milliGRAM(s) IV Push once PRN REACTION  sodium chloride 0.65% Nasal 1 Spray(s) Both Nostrils every 8 hours PRN Nasal Congestion  tranexamic acid Injectable for Nebulization 500 milliGRAM(s) Inhalation every 8 hours PRN Nose bleed      LABS:                        9.9    16.60 )-----------( 260      ( 05 Oct 2023 05:41 )             31.8     10-04    137  |  98  |  28<H>  ----------------------------<  138<H>  4.5   |  27  |  0.38<L>    Ca    9.7      04 Oct 2023 05:43  Phos  3.2     10-04  Mg     2.40     10-04    TPro  5.5<L>  /  Alb  3.2<L>  /  TBili  8.8<H>  /  DBili  x   /  AST  105<H>  /  ALT  197<H>  /  AlkPhos  535<H>  10-04      Urinalysis Basic - ( 04 Oct 2023 05:43 )    Color: x / Appearance: x / SG: x / pH: x  Gluc: 138 mg/dL / Ketone: x  / Bili: x / Urobili: x   Blood: x / Protein: x / Nitrite: x   Leuk Esterase: x / RBC: x / WBC x   Sq Epi: x / Non Sq Epi: x / Bacteria: x            PAST MEDICAL & SURGICAL HISTORY:      FAMILY HISTORY:      Social History:      RADIOLOGY:  [ ] Reviewed and interpreted by me    PULMONARY FUNCTION TESTS:    EKG: CHIEF COMPLAINT: Patient is a 62y old  Female who presents with a chief complaint of VV ECMO.      Interval Events: Plan for DC to acute rehab today 10/5      REVIEW OF SYSTEMS:  States she feels weaker than her normal self but overall improved since hospital course.  [x] All other systems negative        OBJECTIVE:  ICU Vital Signs Last 24 Hrs  T(C): 36.3 (04 Oct 2023 21:08), Max: 36.3 (04 Oct 2023 21:08)  T(F): 97.3 (04 Oct 2023 21:08), Max: 97.3 (04 Oct 2023 21:08)  HR: 78 (04 Oct 2023 21:56) (74 - 87)  BP: 101/57 (04 Oct 2023 21:08) (101/57 - 128/73)  BP(mean): --  ABP: --  ABP(mean): --  RR: 18 (04 Oct 2023 21:08) (18 - 19)  SpO2: 99% (04 Oct 2023 21:56) (95% - 100%)    O2 Parameters below as of 04 Oct 2023 21:08  Patient On (Oxygen Delivery Method): nasal cannula  O2 Flow (L/min): 2            10-04 @ 07:01  -  10-05 @ 07:00  --------------------------------------------------------  IN: 0 mL / OUT: 800 mL / NET: -800 mL      CAPILLARY BLOOD GLUCOSE      POCT Blood Glucose.: 110 mg/dL (04 Oct 2023 21:14)      HOSPITAL MEDICATIONS:  MEDICATIONS  (STANDING):  acetaminophen     Tablet .. 975 milliGRAM(s) Oral once  albuterol/ipratropium for Nebulization 3 milliLiter(s) Nebulizer every 6 hours  artificial  tears Solution 1 Drop(s) Both EYES every 12 hours  atovaquone  Suspension 1500 milliGRAM(s) Oral daily  chlorhexidine 2% Cloths 1 Application(s) Topical <User Schedule>  dextrose 5%. 1000 milliLiter(s) (50 mL/Hr) IV Continuous <Continuous>  dextrose 50% Injectable 12.5 Gram(s) IV Push once  dextrose 50% Injectable 25 Gram(s) IV Push once  dextrose 50% Injectable 12.5 Gram(s) IV Push once  dextrose 50% Injectable 25 Gram(s) IV Push once  enoxaparin Injectable 70 milliGRAM(s) SubCutaneous <User Schedule>  folic acid 1 milliGRAM(s) Oral daily  glucagon  Injectable 1 milliGRAM(s) IntraMuscular once  insulin lispro (ADMELOG) corrective regimen sliding scale   SubCutaneous Before meals and at bedtime  melatonin 6 milliGRAM(s) Oral at bedtime  methylPREDNISolone sodium succinate Injectable 70 milliGRAM(s) IV Push daily  metoprolol tartrate 25 milliGRAM(s) Oral two times a day  midodrine 10 milliGRAM(s) Oral every 8 hours  multivitamin/minerals/iron Oral Solution (CENTRUM) 15 milliLiter(s) Oral daily  pantoprazole  Injectable 40 milliGRAM(s) IV Push daily  senna Syrup 10 milliLiter(s) Oral at bedtime    MEDICATIONS  (PRN):  aluminum hydroxide/magnesium hydroxide/simethicone Suspension 30 milliLiter(s) Oral every 4 hours PRN Dyspepsia  dextrose Oral Gel 15 Gram(s) Oral once PRN Blood Glucose LESS THAN 70 milliGRAM(s)/deciliter  diphenhydrAMINE Injectable 50 milliGRAM(s) IV Push once PRN REACTION  meperidine     Injectable 25 milliGRAM(s) IV Push once PRN REACTION  sodium chloride 0.65% Nasal 1 Spray(s) Both Nostrils every 8 hours PRN Nasal Congestion  tranexamic acid Injectable for Nebulization 500 milliGRAM(s) Inhalation every 8 hours PRN Nose bleed      LABS:                        9.9    16.60 )-----------( 260      ( 05 Oct 2023 05:41 )             31.8     10-04    137  |  98  |  28<H>  ----------------------------<  138<H>  4.5   |  27  |  0.38<L>    Ca    9.7      04 Oct 2023 05:43  Phos  3.2     10-04  Mg     2.40     10-04    TPro  5.5<L>  /  Alb  3.2<L>  /  TBili  8.8<H>  /  DBili  x   /  AST  105<H>  /  ALT  197<H>  /  AlkPhos  535<H>  10-04      Urinalysis Basic - ( 04 Oct 2023 05:43 )    Color: x / Appearance: x / SG: x / pH: x  Gluc: 138 mg/dL / Ketone: x  / Bili: x / Urobili: x   Blood: x / Protein: x / Nitrite: x   Leuk Esterase: x / RBC: x / WBC x   Sq Epi: x / Non Sq Epi: x / Bacteria: x        PAST MEDICAL & SURGICAL HISTORY:      FAMILY HISTORY:      Social History:      RADIOLOGY:  [ ] Reviewed and interpreted by me    PULMONARY FUNCTION TESTS:    EKG:

## 2023-10-05 NOTE — H&P ADULT - NSHPREVIEWOFSYSTEMS_GEN_ALL_CORE
REVIEW OF SYSTEMS  Constitutional: No fever, No Chills, + fatigue  HEENT: No eye pain, No visual disturbances, No difficulty hearing  Pulm: No cough,  No shortness of breath  Cardio: No chest pain, No palpitations  GI:  No abdominal pain, No nausea, No vomiting, No diarrhea, No constipation  : No dysuria, No frequency, No hematuria  Neuro: No headaches, No memory loss, + loss of strength, no numbness, No tremors  Skin: + wounds  Endo: No temperature intolerance  MSK: No joint pain, No joint swelling, + muscle pain, No Neck pain,  No back pain, + edema  Psych:  No depression, No anxiety

## 2023-10-05 NOTE — H&P ADULT - NSHPLABSRESULTS_GEN_ALL_CORE
LABS:                        9.9    16.60 )-----------( 260      ( 05 Oct 2023 05:41 )             31.8     10-05    135  |  98  |  24<H>  ----------------------------<  95  4.1   |  22  |  0.38<L>    Ca    9.3      05 Oct 2023 05:41  Phos  2.9     10-05  Mg     2.10     10-05    TPro  5.4<L>  /  Alb  3.0<L>  /  TBili  8.5<H>  /  DBili  6.0<H>  /  AST  110<H>  /  ALT  173<H>  /  AlkPhos  533<H>  10-05    Urinalysis Basic - ( 05 Oct 2023 05:41 )    Color: x / Appearance: x / SG: x / pH: x  Gluc: 95 mg/dL / Ketone: x  / Bili: x / Urobili: x   Blood: x / Protein: x / Nitrite: x   Leuk Esterase: x / RBC: x / WBC x   Sq Epi: x / Non Sq Epi: x / Bacteria: x    IMAGING/RADIOLOGY:  CT CHEST/ CT AP (9/30)  IMPRESSION:  1.  Bilateral groundglass opacities consistent with pulmonary edema, markedly improved from the previously seen extensive lung consolidations on 9/14/2023.  2.  Interval removal of ECMO catheters. No right groin abscess or evidence of infection at prior ECMO calculation site.    XR- CHEST & ABDOMEN (9/19)  Impression:  *  Diffuse bilateral hazy opacities consistent with patient'shistory of ARDS. Left pleural effusion and trace right pleural effusion.  *  Nonobstructive bowel gas pattern.    CT CHEST/CT AP (9/14)  IMPRESSION:  Airway secretions and large bilateral airspace consolidation, new since 9/11/2023. Small bilateral pleural effusions.  Interval ECMO placement.  Trace abdominopelvic ascites.    CTA CHEST PE PROTOCOL (9/11)  IMPRESSION:  1.  Improved/decreased extent of bilateral groundglass opacities and reticular markings compared to the previous study. Findings are   nonspecific but differential etiologies include interstitial pneumonia, drug toxicity, nonspecific connective tissue disorders.  2.  New small focus of parenchymal consolidation seen in the lingula; possible small focal pneumonia.  3.  No pulmonary embolism    CT CHEST (8/28)   IMPRESSION:  1. Since August 26, 2023, again interstitial lung disease non-UIP pattern. Although no subpleural sparing, possibly interstitial pneumonia, differential includes NSIP associated with connective tissue disorders, chronic HP or drug toxicity.    CARDIOLOGY:  TTE (10/4)   CONCLUSIONS:   1. Left ventricular cavity is normally sized. Left ventricular wall thickness is normal. Left ventricular systolic function is normal with an ejection fraction of 64 % by Barnes's method of disks.   2. Normal right ventricular cavity size, wall thickness and systolic function.   3. There is trace tricuspid regurgitation. There is insufficient tricuspid regurgitation detected to calculate pulmonary artery systolic pressure.   4. There is mild aortic regurgitation.   5. No pericardial effusion seen.   6. Compared to the transthoracic echocardiogram performed on 9/19/2023 there have been no significant interval changes.   7. Normal left ventricular diastolic function, with normal filling pressure.

## 2023-10-05 NOTE — PROGRESS NOTE ADULT - ASSESSMENT
ASSESSMENT & PLAN:   61 yo F w/ Afib initially presented to urgent care for SOB where she had an XR done concerning for b/l lower infiltrates, sent to [facility] ED and admitted to  on 8/26 after being found to be hypoxemic, reported having  debilitating b/l hand numbness/tingling and some muscles weakness several months. She was found to have interstitial lung disease appearance on CT, with the plan for further ILD workup and management, started on prednisone on admission with gradually improving O2 requirements and stable on 2-3LNC. She underwent bronchoscopy with TBBX on 8/30 which showed Non-Specific Intersitial Disease (NSIP)/OP. Labs notable for positive ARIANA 1:1280, RNP > 8, Alejandro > 8. Patient continued on steroids and received cellcept, which was discontinued 2/2 Afib-RVR. Interval CTA chest on 9/11 also negative PE did show possible lingula pneumonia. Started on empiric vancomycin and zosyn 9/12, course was then c/b episode of SVT to 170s w/ rapidly progressive hypoxemic respiratory failure, placed NRB offered HFNC/BiPAP but refused, leading to worsening hypoxemic respiratory failure requiring intubation on 9/13. Despite best practices, patient remained hypoxemic and patient was cannulated for VV-ECMO on 9/13 and transferred to [facility] for further management. 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 Found to have elevated LFT's/Bili rising likely shock liver and shock state, now improving. Showed significant lung improvement and was decannulated 9/21. Extubated to HFNC 9/22. Currently tolerating 5L NC.    NEUROLOGY  Home meds: gabapentin 100g TID; holding for now  AA&Ox3 at baseline   - following commands w/ delirium likely iso prolonged hospitalization, sedation, and ICU setting. Now improved; following commands and understands conversation.  - required precedex for anxiety, stopped 9/22  - seroquel 25mg QHS for anti +delirium d/c'd,  may have caused lethargy and hypotension, , currently does not require any meds  - CT 9/14 no acute findings  - C/w aggressive PT/OT - Oob to chair twice daily, currently with kandi lift,  increase activity as tolerated  - Social work - [facility]?   - PM&R consult     ENT  #Epistaxis   Small amount of intermittent bleeding noted in b/l nares and hemoptysis?  - Will continue with afrin and nebulized TXA prn; once able to tolerate food, we will remove the NG tube  - humidified air provided via face tent  - Nasal saline spray 1 spray each nose PRN   - Afrin 1 spray for rebleeding followed by direct pressure on the nose, if still actively bleeding, call ENT for reassessment  - monitor hgb and for signs of overt bleeding closely       PULMONARY  Admitted to [facility] on 8/26 after p/w SOB, found to be hypoxemic, required 2-4L NC/bibap, initially responded well to IV steroids. Interval CTA chest on 9/11 also showed improved in reticular findings and diffuse GGO, then had episode of SVT to 170s (9/12) with rapidly progressive hypoxemic respiratory failure leading to intubation 9/13 required very high PEEP (18) and 100% FiO2 and still had low saturations,  VV ECMO cannula placement 9/13 and was then transferred to 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 2/2 bacterial PNA vs atypical PNA vs aspiration vs AEILD continued to show significant improvement from a lung perspective and was decannulated 9/21, and extubated to HFNC 9/22, now tolerating 50% face tent.  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   - 9/20 BAL culture: normal respiratory selvin  - continue duonebs  - s/p decannulation on 9/21 with plan for suture removal ~7-10 days   - extubated 9/22 to HFNC   - weaned from HFNC to 6L nasal cannula, given epistaxis currently on face tent. Will continue with face tent until epistaxis fully resolves.  - CT 9/30 w/reticular peripheral lung opacities, LL atelectasis, and bilateral GGO's, improved compared to prior image (9/14) findings that showed extensive lung consolidations    CARDIOVASCULAR  Hx of Afib w at OSH, started on Amiodarone gtt now in shock state requiring requiring pressors likely septic with component of vaspoplegia i/s/o sedation, possible contribution of cardiogenic from RV dysfunction. Had episodes of SVT (9/25) responsive to lopressor   - No PE seen on invasive pulmonary angiography at time of ECMO cannulation or in recent imaging  - NO2 weaned off  (9/15) RV function remains unchanged  - s/p milrinone, off since (9/13)  - converted to sinus (9/14) discontinued amio gtt iso bradycardia, back to AF with RVR on 9/19 when sedation weaned off  - continue 25mg metoprolol BID for rate control   - Phenylephrine off since (9/25),  - continue midodrine to assist with blood pressure, dose lowered from 20mg to 10mg q8 hours iso bradycardia; will titrate down as tolerated  - RITCHIE 9/14 : No MEREDITH thrombus noted, negative for PFO. LVOT diameter of 2 cm  - TTE 9/12 with EF 65-70% with normal LV and RV  - TTE 9/14 with  EF 18%. Severe global LV systolic dysfunction. RV enlargement with decreased RV systolic function, however RITCHIE and recent POCUS shows normal LV systolic function, improved RV systolic function   - TTE 9/19 with recovered EF to 67% but still with RV enlargement and systolic dysfunction  - Repeat TTE pending       GASTROINTESTINAL  Transaminase elevation likely 2/2 combination of shock liver, malposition of ECMO cannula and liver dysfunction  - ALK/ AST/ALT downtrending but remain elevated 624/105/177, T.bili peaked 19.3 mostly direct, and now 9.4  - Acute hepatitis panel negative  - RUQ US 9/15 shows fatty infiltration of liver, negative for hepatobiliary pathology, repeat RUQ US performed 9/22 for worsening LFT's and rising bili with findings negative as well  - Hepatology consulted - likely shock liver with expected delay in improvement in bilirubin  - Hypertriglyceridemia 836, hx of fatty liver and propofol gtt, s/p insulin gtt, now improving  - Given previous watery bowel movements, will continue with senna only at this time. Last BM 10/01   - failed dysphagia screen x2, received continuous TF via KFT  - cineesophagogram today, will start recommended diet minced/moist and thin liquids, keep KFT for now, remove if patient meets nutritional requirements with oral intake  - CT abdomen 9/15 w/o bowel obstruction, noted with colonic diverticulosis mostly in sigmoid, w/o evidence of diverticulitis  - CT abdomen 9/30 w/ sigmoid diverticulosis, without evidence of acute diverticulitis, w/distended gallbladder w/sludge   - HIDA scan 10/01 obtained iso recent imaging, leukocytosis and abdominal discomfort, however no evidence found of acute cholecystitis or biliary obstruction       RENAL  sCr baseline 0.78  - Creatinine wnl  - I/O's:  negative -8.4L/LOS and negative -1040mL/24 hours  - s/p diuresis with lasix, now given PRN, goal to keep net even to slightly negative  - good UO with primafit  - hypernatremia improved, free H2O discontinued 9/21      INFECTIOUS DISEASE  Leukocytosis  - Afebrile, however WBC continues to uptrend, likely iso filgrastim (initiated 9/17-9/21 for leukopenia) vs infection?    - blood cultures negative to date, sputum and right groin (s/p ecmo cannula site) - send wound culture if drainage present  - UA+ with many bacteria, however urine culture negative, s/p CTX 1gm IVPB daily (9/27-10/1 ) and empiric Vanco (9/29-10/1)   - CT imaging CAP 9/30 and HIDA scan 10/01 obtained iso leukocytosis,  findings with no evidence of infection/abscess  - previous cultures, BAL, cytopathology so far negative  - leukopenia now resolved s/p filgrastim (9/17-9/21)  - vanco restarted for ppx on 9/18 d/t leukopenia but stopped on 9/20  - Bactrim DS discontinued iso leukopenia, continue Mepron for PJP prophylaxis instead  - s/p IV Ceftriaxone 5 days? at [facility] out of an abundance of precaution to cover BAL specimen  - s/p zosyn at OSH (9/12) for lingula PNA  - s/p vancomycin (9/12-9/15 ) and azithro (9/14-9/15)   - s/p empiric donald (9/13-9/20) now that ANC >1.5  - positive COVID-19 antigen in late August, recent RVP negative 9/30  - ID following- f/u recs      ENDOCRINE  # Hyperglycemia i/s/o steroids  Admission A1c 6.1  - s/p insulin gtt, transitioned to NPH 6u q6 with mod ISS while receiving TF, will change to Lantus 10 units QHS with mod ISS for now  - continue to adjust insulin and ISS as steroids are tapered and diet changes  - elevated triglycerides 835, has hx of fatty liver, had been on propofol gtt. TG downtrending since initiating insulin gtt for hyperglycemia, now normalizing off propofol  - TSH low initially at 0.13 repeat normal     HEME  #Right IJ thrombus   - s/p argatroban gtt , transitioned to lovenox   - Lowered lovenox dose [id number] BID to 100mg BID based on elevated Anti Xa level (1.14), level redrawn (1.08) now lowered to 90mg iso epistaxis, next Anti Xa level due 10/4 at 1300  - INR elevated likely iso argatroban and liver dysfxn, s/p Vit. K (9/14) and FFP x1 (9/15) - now resolved  - platelets x 7 for thrombocytopenia <50,000, last transfused  9/21 -now improved  - Intermittent episodes of epistaxis, no overt bleeding noted.   - Hgb slight downtrend but remains stable, likely anemia in critical illness and recent episodes of epistaxis    #Leukopenia  #Thrombocytopenia  - Thrombocytopenia refractory after intermittent transfusions while on circuit  - Heme consult placed for thrombocytopenia, noted to also be leukopenic but now resolved   - unlikely HLH/MAS since many abnormalities can be explained by severe hypoxemia/hypotension during initial decompensation prior to ecmo cannulation but some features that are consistent and with rheumatic disease it is a possibility to f/u hematology regarding utility of further lab/pathologic testing  - peripheral blood smear reviewed: large platelets noted, no platelet clumps, no blast cells noted, neutrophils noted w/ normal number of lobes, nucleated RBC noted  - acute hepatitis panel negative  - s/p filgrastim 480 daily  given ANC >1500 in the setting of possible infxn       #R/O [id number]    - LE duplex negative for DVT   - first VA duplex post decannulation 9/21 shows non-occlusive deep vein thrombosis in the right jugular vein and the right cephalic is non-compressible  - continue lovenox for now and dose based on anti-Factor Xa levels      MSK  Onset of debilitating b/l hand cramps and some muscles weakness x several months, unclear etiology, radiculopathy? vs myositis?   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   - symptoms improved with cellcept (9/8-9/11) but discontinued given Afib RVR   - f/u with Dr. Nik Marie or Dante Urbano for EMG upon discharge (osh?)      RHEUM  - started on Solumedrol 60mg q6h from 9/1 to 9/6 then weaned over time to then Medrol 32 mg 9/9 to 9/12 at [facility]  - s/p Cellcept 9/8 to 9/11 but stopped due to Afib- RVR/tachycardia   - CT findings, Improved/decreased extent of bilateral ground glass opacities and reticular markings compared to the previous study. Findings are nonspecific but differential etiologies include interstitial pneumonia, drug toxicity, nonspecific connective tissue disorders.  - OSH labs show strongly positive ARIANA, RNP, and anti smith antibodies with low C4 and normal C3. 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  - s/p rituximab 9/16/23  - plan for second rituximab dose was for ~9/30/23 but will hold off for now given patient currently receiving antibiotics   - S/P Rituxan dose #2 (10/3)      SKIN  Reportedly had single episode of painful rash on her shins, ears and neck and chest which resolved with a steroid cream from a dermatologist prior to admission  - no evidence of rash currently  - right groin with breakdown s/p cannula placement may be possible source of infection - wound culture not collected, no drainage noted  - as per wound care,  area does not appear infected       PROPHYLAXIS  # DVT: Lovenox  # GI: TF      LINES  -Ecmo Cannulas 9/13-9/21  -[facility] TLC- 9/13-9/21  -Left Ax [place] - 9/13-9/27 (placed at OSH)  -RA 16g PIV x2- 9/15     ASSESSMENT & PLAN:   63 yo F w/ Afib initially presented to urgent care for SOB where she had an XR done concerning for b/l lower infiltrates, sent to [facility] ED and admitted to  on 8/26 after being found to be hypoxemic, reported having  debilitating b/l hand numbness/tingling and some muscles weakness several months. She was found to have interstitial lung disease appearance on CT, with the plan for further ILD workup and management, started on prednisone on admission with gradually improving O2 requirements and stable on 2-3LNC. She underwent bronchoscopy with TBBX on 8/30 which showed Non-Specific Intersitial Disease (NSIP)/OP. Labs notable for positive ARIANA 1:1280, RNP > 8, Alejandro > 8. Patient continued on steroids and received cellcept, which was discontinued 2/2 Afib-RVR. Interval CTA chest on 9/11 also negative PE did show possible lingula pneumonia. Started on empiric vancomycin and zosyn 9/12, course was then c/b episode of SVT to 170s w/ rapidly progressive hypoxemic respiratory failure, placed NRB offered HFNC/BiPAP but refused, leading to worsening hypoxemic respiratory failure requiring intubation on 9/13. Despite best practices, patient remained hypoxemic and patient was cannulated for VV-ECMO on 9/13 and transferred to [facility] for further management. 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 Found to have elevated LFT's/Bili rising likely shock liver and shock state, now improving. Showed significant lung improvement and was decannulated 9/21. Extubated to HFNC 9/22. Currently tolerating 5L NC.    NEUROLOGY  Home meds: gabapentin 100g TID; holding for now  AA&Ox3 at baseline   - following commands w/ delirium likely iso prolonged hospitalization, sedation, and ICU setting. Now improved; following commands and understands conversation.  - required precedex for anxiety, stopped 9/22  - seroquel 25mg QHS for anti +delirium d/c'd,  may have caused lethargy and hypotension, , currently does not require any meds  - CT 9/14 no acute findings  - C/w aggressive PT/OT - Oob to chair twice daily, currently with kandi lift,  increase activity as tolerated  - Social work - [facility]?   - PM&R consult     ENT  #Epistaxis   Small amount of intermittent bleeding noted in b/l nares and hemoptysis?  - Will continue with afrin and nebulized TXA prn; once able to tolerate food, we will remove the NG tube  - humidified air provided via face tent  - Nasal saline spray 1 spray each nose PRN   - Afrin 1 spray for rebleeding followed by direct pressure on the nose, if still actively bleeding, call ENT for reassessment  - monitor hgb and for signs of overt bleeding closely       PULMONARY  Admitted to [facility] on 8/26 after p/w SOB, found to be hypoxemic, required 2-4L NC/bibap, initially responded well to IV steroids. 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 2/2 bacterial PNA vs atypical PNA vs aspiration vs AEILD continued to show significant improvement from a lung perspective and was decannulated 9/21, and extubated to HFNC 9/22, now tolerating 50% face tent.  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   - 9/20 BAL culture: normal respiratory selvin  - continue duonebs  - s/p decannulation on 9/21 with plan for suture removal ~7-10 days   - extubated 9/22 to HFNC   - weaned from HFNC to 6L nasal cannula, given epistaxis currently on face tent. Will continue with face tent until epistaxis fully resolves.  - CT 9/30 w/reticular peripheral lung opacities, LL atelectasis, and bilateral GGO's, improved compared to prior image (9/14) findings that showed extensive lung consolidations    CARDIOVASCULAR  Hx of Afib w at OSH, started on Amiodarone gtt now in shock state requiring requiring pressors likely septic with component of vaspoplegia i/s/o sedation, possible contribution of cardiogenic from RV dysfunction. Had episodes of SVT (9/25) responsive to lopressor   - No PE seen on invasive pulmonary angiography at time of ECMO cannulation or in recent imaging  - NO2 weaned off  (9/15) RV function remains unchanged  - s/p milrinone, off since (9/13)  - converted to sinus (9/14) discontinued amio gtt iso bradycardia, back to AF with RVR on 9/19 when sedation weaned off  - continue 25mg metoprolol BID for rate control   - Phenylephrine off since (9/25),  - continue midodrine to assist with blood pressure, dose lowered from 20mg to 10mg q8 hours iso bradycardia; will titrate down as tolerated  - RITCHIE 9/14 : No MEREDITH thrombus noted, negative for PFO. LVOT diameter of 2 cm  - TTE 9/12 with EF 65-70% with normal LV and RV  - TTE 9/14 with  EF 18%. Severe global LV systolic dysfunction. RV enlargement with decreased RV systolic function, however RITCHIE and recent POCUS shows normal LV systolic function, improved RV systolic function   - TTE 9/19 with recovered EF to 67% but still with RV enlargement and systolic dysfunction  - Repeat TTE pending       GASTROINTESTINAL  Transaminase elevation likely 2/2 combination of shock liver, malposition of ECMO cannula and liver dysfunction  - ALK/ AST/ALT downtrending but remain elevated 624/105/177, T.bili peaked 19.3 mostly direct, and now 9.4  - Acute hepatitis panel negative  - RUQ US 9/15 shows fatty infiltration of liver, negative for hepatobiliary pathology, repeat RUQ US performed 9/22 for worsening LFT's and rising bili with findings negative as well  - Hepatology consulted - likely shock liver with expected delay in improvement in bilirubin  - Hypertriglyceridemia 836, hx of fatty liver and propofol gtt, s/p insulin gtt, now improving  - Given previous watery bowel movements, will continue with senna only at this time. Last BM 10/01   - failed dysphagia screen x2, received continuous TF via KFT  - cineesophagogram today, will start recommended diet minced/moist and thin liquids, keep KFT for now, remove if patient meets nutritional requirements with oral intake  - CT abdomen 9/15 w/o bowel obstruction, noted with colonic diverticulosis mostly in sigmoid, w/o evidence of diverticulitis  - CT abdomen 9/30 w/ sigmoid diverticulosis, without evidence of acute diverticulitis, w/distended gallbladder w/sludge   - HIDA scan 10/01 obtained iso recent imaging, leukocytosis and abdominal discomfort, however no evidence found of acute cholecystitis or biliary obstruction       RENAL  sCr baseline 0.78  - Creatinine wnl  - I/O's:  negative -8.4L/LOS and negative -1040mL/24 hours  - s/p diuresis with lasix, now given PRN, goal to keep net even to slightly negative  - good UO with primafit  - hypernatremia improved, free H2O discontinued 9/21      INFECTIOUS DISEASE  Leukocytosis  - Afebrile, however WBC continues to uptrend, likely iso filgrastim (initiated 9/17-9/21 for leukopenia) vs infection?    - blood cultures negative to date, sputum and right groin (s/p ecmo cannula site) - send wound culture if drainage present  - UA+ with many bacteria, however urine culture negative, s/p CTX 1gm IVPB daily (9/27-10/1 ) and empiric Vanco (9/29-10/1)   - CT imaging CAP 9/30 and HIDA scan 10/01 obtained iso leukocytosis,  findings with no evidence of infection/abscess  - previous cultures, BAL, cytopathology so far negative  - leukopenia now resolved s/p filgrastim (9/17-9/21)  - vanco restarted for ppx on 9/18 d/t leukopenia but stopped on 9/20  - Bactrim DS discontinued iso leukopenia, continue Mepron for PJP prophylaxis instead  - s/p IV Ceftriaxone 5 days? at [facility] out of an abundance of precaution to cover BAL specimen  - s/p zosyn at OSH (9/12) for lingula PNA  - s/p vancomycin (9/12-9/15 ) and azithro (9/14-9/15)   - s/p empiric odnald (9/13-9/20) now that ANC >1.5  - positive COVID-19 antigen in late August, recent RVP negative 9/30  - ID following- f/u recs      ENDOCRINE  # Hyperglycemia i/s/o steroids  Admission A1c 6.1  - s/p insulin gtt, transitioned to NPH 6u q6 with mod ISS while receiving TF, will change to Lantus 10 units QHS with mod ISS for now  - continue to adjust insulin and ISS as steroids are tapered and diet changes  - elevated triglycerides 835, has hx of fatty liver, had been on propofol gtt. TG downtrending since initiating insulin gtt for hyperglycemia, now normalizing off propofol  - TSH low initially at 0.13 repeat normal     HEME  #Right IJ thrombus   - s/p argatroban gtt , transitioned to lovenox   - Lowered lovenox dose [id number] BID to 100mg BID based on elevated Anti Xa level (1.14), level redrawn (1.08) now lowered to 90mg iso epistaxis, next Anti Xa level due 10/4 at 1300  - INR elevated likely iso argatroban and liver dysfxn, s/p Vit. K (9/14) and FFP x1 (9/15) - now resolved  - platelets x 7 for thrombocytopenia <50,000, last transfused  9/21 -now improved  - Intermittent episodes of epistaxis, no overt bleeding noted.   - Hgb slight downtrend but remains stable, likely anemia in critical illness and recent episodes of epistaxis    #Leukopenia  #Thrombocytopenia  - Thrombocytopenia refractory after intermittent transfusions while on circuit  - Heme consult placed for thrombocytopenia, noted to also be leukopenic but now resolved   - unlikely HLH/MAS since many abnormalities can be explained by severe hypoxemia/hypotension during initial decompensation prior to ecmo cannulation but some features that are consistent and with rheumatic disease it is a possibility to f/u hematology regarding utility of further lab/pathologic testing  - peripheral blood smear reviewed: large platelets noted, no platelet clumps, no blast cells noted, neutrophils noted w/ normal number of lobes, nucleated RBC noted  - acute hepatitis panel negative  - s/p filgrastim 480 daily  given ANC >1500 in the setting of possible infxn       #R/O [id number]    - LE duplex negative for DVT   - first VA duplex post decannulation 9/21 shows non-occlusive deep vein thrombosis in the right jugular vein and the right cephalic is non-compressible  - continue lovenox for now and dose based on anti-Factor Xa levels      MSK  Onset of debilitating b/l hand cramps and some muscles weakness x several months, unclear etiology, radiculopathy? vs myositis?   - MRI outpatient reportedly showed cervical spine issues, started on Prednisone shortly before admission at OSH  - myomarker panel + for RNP and Ku  - seen by neurologist at [facility]   - symptoms improved with cellcept (9/8-9/11) but discontinued given Afib RVR   - f/u with Dr. Nik Marie or Dante Urbano for EMG upon discharge (osh?)      RHEUM  - started on Solumedrol 60mg q6h from 9/1 to 9/6 then weaned over time to then Medrol 32 mg 9/9 to 9/12 at [facility]  - s/p Cellcept 9/8 to 9/11 but stopped due to Afib- RVR/tachycardia   - CT findings, Improved/decreased extent of bilateral ground glass opacities and reticular markings compared to the previous study. Findings are nonspecific but differential etiologies include interstitial pneumonia, drug toxicity, nonspecific connective tissue disorders.  - OSH labs show strongly positive ARIANA, RNP, and anti smith antibodies with low C4 and normal C3. 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  - s/p rituximab 9/16/23  - plan for second rituximab dose was for ~9/30/23 but will hold off for now given patient currently receiving antibiotics   - S/P Rituxan dose #2 (10/3)      SKIN  Reportedly had single episode of painful rash on her shins, ears and neck and chest which resolved with a steroid cream from a dermatologist prior to admission  - no evidence of rash currently  - right groin with breakdown s/p cannula placement may be possible source of infection - wound culture not collected, no drainage noted  - as per wound care,  area does not appear infected       PROPHYLAXIS  # DVT: Lovenox  # GI: TF      LINES  -Ecmo Cannulas 9/13-9/21  -[facility] TLC- 9/13-9/21  -Left Ax [place] - 9/13-9/27 (placed at OSH)  -RA 16g PIV x2- 9/15    DC Planning to Rehab today 10/5

## 2023-10-05 NOTE — PROGRESS NOTE ADULT - REASON FOR ADMISSION
VV ECMO

## 2023-10-05 NOTE — PATIENT PROFILE ADULT - HAVE YOU BEEN EATING POORLY BECAUSE OF A DECREASED APPETITE?
Per history spontaneous ecchymosis of the left foot.  INR not supratherapeutic.  We will obtain CBC to evaluate platelet level  It is more likely that the patient had some degree of trauma and in view of the anticoagulation developed a hematoma   No (0)

## 2023-10-06 ENCOUNTER — NON-APPOINTMENT (OUTPATIENT)
Age: 63
End: 2023-10-06

## 2023-10-06 DIAGNOSIS — R04.0 EPISTAXIS: ICD-10-CM

## 2023-10-06 DIAGNOSIS — R49.0 DYSPHONIA: ICD-10-CM

## 2023-10-06 PROBLEM — I48.91 UNSPECIFIED ATRIAL FIBRILLATION: Chronic | Status: ACTIVE | Noted: 2023-10-05

## 2023-10-06 PROBLEM — M54.30 SCIATICA, UNSPECIFIED SIDE: Chronic | Status: ACTIVE | Noted: 2023-10-05

## 2023-10-06 LAB
ALBUMIN SERPL ELPH-MCNC: 2.2 G/DL — LOW (ref 3.3–5)
ALP SERPL-CCNC: 496 U/L — HIGH (ref 40–120)
ALT FLD-CCNC: 181 U/L — HIGH (ref 10–45)
ANION GAP SERPL CALC-SCNC: 7 MMOL/L — SIGNIFICANT CHANGE UP (ref 5–17)
AST SERPL-CCNC: 90 U/L — HIGH (ref 10–40)
BASOPHILS # BLD AUTO: 0 K/UL — SIGNIFICANT CHANGE UP (ref 0–0.2)
BASOPHILS NFR BLD AUTO: 0 % — SIGNIFICANT CHANGE UP (ref 0–2)
BILIRUB SERPL-MCNC: 8.2 MG/DL — HIGH (ref 0.2–1.2)
BUN SERPL-MCNC: 22 MG/DL — SIGNIFICANT CHANGE UP (ref 7–23)
CALCIUM SERPL-MCNC: 8.9 MG/DL — SIGNIFICANT CHANGE UP (ref 8.4–10.5)
CHLORIDE SERPL-SCNC: 101 MMOL/L — SIGNIFICANT CHANGE UP (ref 96–108)
CO2 SERPL-SCNC: 29 MMOL/L — SIGNIFICANT CHANGE UP (ref 22–31)
CREAT SERPL-MCNC: 0.46 MG/DL — LOW (ref 0.5–1.3)
EGFR: 107 ML/MIN/1.73M2 — SIGNIFICANT CHANGE UP
EOSINOPHIL # BLD AUTO: 0 K/UL — SIGNIFICANT CHANGE UP (ref 0–0.5)
EOSINOPHIL NFR BLD AUTO: 0 % — SIGNIFICANT CHANGE UP (ref 0–6)
GLUCOSE BLDC GLUCOMTR-MCNC: 140 MG/DL — HIGH (ref 70–99)
GLUCOSE BLDC GLUCOMTR-MCNC: 171 MG/DL — HIGH (ref 70–99)
GLUCOSE BLDC GLUCOMTR-MCNC: 212 MG/DL — HIGH (ref 70–99)
GLUCOSE BLDC GLUCOMTR-MCNC: 247 MG/DL — HIGH (ref 70–99)
GLUCOSE SERPL-MCNC: 124 MG/DL — HIGH (ref 70–99)
HCT VFR BLD CALC: 32 % — LOW (ref 34.5–45)
HGB BLD-MCNC: 9.9 G/DL — LOW (ref 11.5–15.5)
HYPOCHROMIA BLD QL: SLIGHT — SIGNIFICANT CHANGE UP
LYMPHOCYTES # BLD AUTO: 0.37 K/UL — LOW (ref 1–3.3)
LYMPHOCYTES # BLD AUTO: 3 % — LOW (ref 13–44)
MACROCYTES BLD QL: SLIGHT — SIGNIFICANT CHANGE UP
MANUAL SMEAR VERIFICATION: SIGNIFICANT CHANGE UP
MCHC RBC-ENTMCNC: 30.9 GM/DL — LOW (ref 32–36)
MCHC RBC-ENTMCNC: 33 PG — SIGNIFICANT CHANGE UP (ref 27–34)
MCV RBC AUTO: 106.7 FL — HIGH (ref 80–100)
MONOCYTES # BLD AUTO: 0.62 K/UL — SIGNIFICANT CHANGE UP (ref 0–0.9)
MONOCYTES NFR BLD AUTO: 5 % — SIGNIFICANT CHANGE UP (ref 2–14)
MYELOCYTES NFR BLD: 4 % — HIGH (ref 0–0)
NEUTROPHILS # BLD AUTO: 10.99 K/UL — HIGH (ref 1.8–7.4)
NEUTROPHILS NFR BLD AUTO: 87 % — HIGH (ref 43–77)
NEUTS BAND # BLD: 1 % — SIGNIFICANT CHANGE UP (ref 0–8)
NRBC # BLD: 1 /100 — HIGH (ref 0–0)
PLAT MORPH BLD: NORMAL — SIGNIFICANT CHANGE UP
PLATELET # BLD AUTO: 208 K/UL — SIGNIFICANT CHANGE UP (ref 150–400)
POTASSIUM SERPL-MCNC: 3.7 MMOL/L — SIGNIFICANT CHANGE UP (ref 3.5–5.3)
POTASSIUM SERPL-SCNC: 3.7 MMOL/L — SIGNIFICANT CHANGE UP (ref 3.5–5.3)
PROT SERPL-MCNC: 5 G/DL — LOW (ref 6–8.3)
RBC # BLD: 3 M/UL — LOW (ref 3.8–5.2)
RBC # FLD: 23.4 % — HIGH (ref 10.3–14.5)
RBC BLD AUTO: ABNORMAL
SODIUM SERPL-SCNC: 137 MMOL/L — SIGNIFICANT CHANGE UP (ref 135–145)
WBC # BLD: 12.49 K/UL — HIGH (ref 3.8–10.5)
WBC # FLD AUTO: 12.49 K/UL — HIGH (ref 3.8–10.5)

## 2023-10-06 PROCEDURE — 99222 1ST HOSP IP/OBS MODERATE 55: CPT

## 2023-10-06 PROCEDURE — 99223 1ST HOSP IP/OBS HIGH 75: CPT

## 2023-10-06 PROCEDURE — 93971 EXTREMITY STUDY: CPT | Mod: 26,RT

## 2023-10-06 PROCEDURE — 93970 EXTREMITY STUDY: CPT | Mod: 26

## 2023-10-06 PROCEDURE — 93926 LOWER EXTREMITY STUDY: CPT | Mod: 26,RT

## 2023-10-06 PROCEDURE — 93926 LOWER EXTREMITY STUDY: CPT | Mod: 26,LT

## 2023-10-06 RX ORDER — SODIUM CHLORIDE 9 MG/ML
5 INJECTION INTRAMUSCULAR; INTRAVENOUS; SUBCUTANEOUS
Refills: 0 | Status: DISCONTINUED | OUTPATIENT
Start: 2023-10-06 | End: 2023-12-20

## 2023-10-06 RX ORDER — ASCORBIC ACID 60 MG
500 TABLET,CHEWABLE ORAL DAILY
Refills: 0 | Status: DISCONTINUED | OUTPATIENT
Start: 2023-10-06 | End: 2023-12-20

## 2023-10-06 RX ORDER — ZINC SULFATE TAB 220 MG (50 MG ZINC EQUIVALENT) 220 (50 ZN) MG
220 TAB ORAL DAILY
Refills: 0 | Status: COMPLETED | OUTPATIENT
Start: 2023-10-06 | End: 2023-10-15

## 2023-10-06 RX ADMIN — ZINC SULFATE TAB 220 MG (50 MG ZINC EQUIVALENT) 220 MILLIGRAM(S): 220 (50 ZN) TAB at 13:08

## 2023-10-06 RX ADMIN — ATOVAQUONE 1500 MILLIGRAM(S): 750 SUSPENSION ORAL at 13:10

## 2023-10-06 RX ADMIN — ZINC OXIDE 1 APPLICATION(S): 200 OINTMENT TOPICAL at 05:33

## 2023-10-06 RX ADMIN — Medication 500 MILLIGRAM(S): at 13:09

## 2023-10-06 RX ADMIN — Medication 64 MILLIGRAM(S): at 05:34

## 2023-10-06 RX ADMIN — Medication 4: at 17:12

## 2023-10-06 RX ADMIN — Medication 25 MILLIGRAM(S): at 17:58

## 2023-10-06 RX ADMIN — ENOXAPARIN SODIUM 70 MILLIGRAM(S): 100 INJECTION SUBCUTANEOUS at 09:09

## 2023-10-06 RX ADMIN — Medication 15 MILLILITER(S): at 13:10

## 2023-10-06 RX ADMIN — NYSTATIN CREAM 1 APPLICATION(S): 100000 CREAM TOPICAL at 17:59

## 2023-10-06 RX ADMIN — SODIUM CHLORIDE 5 MILLILITER(S): 9 INJECTION INTRAMUSCULAR; INTRAVENOUS; SUBCUTANEOUS at 18:19

## 2023-10-06 RX ADMIN — Medication 6 MILLIGRAM(S): at 21:23

## 2023-10-06 RX ADMIN — PANTOPRAZOLE SODIUM 40 MILLIGRAM(S): 20 TABLET, DELAYED RELEASE ORAL at 05:34

## 2023-10-06 RX ADMIN — Medication 1 APPLICATION(S): at 05:35

## 2023-10-06 RX ADMIN — NYSTATIN CREAM 1 APPLICATION(S): 100000 CREAM TOPICAL at 05:33

## 2023-10-06 RX ADMIN — Medication 1 APPLICATION(S): at 17:59

## 2023-10-06 RX ADMIN — Medication 25 MILLIGRAM(S): at 05:34

## 2023-10-06 RX ADMIN — ZINC OXIDE 1 APPLICATION(S): 200 OINTMENT TOPICAL at 18:02

## 2023-10-06 RX ADMIN — SENNA PLUS 2 TABLET(S): 8.6 TABLET ORAL at 21:24

## 2023-10-06 RX ADMIN — ENOXAPARIN SODIUM 70 MILLIGRAM(S): 100 INJECTION SUBCUTANEOUS at 21:23

## 2023-10-06 RX ADMIN — Medication 1 MILLIGRAM(S): at 13:09

## 2023-10-06 RX ADMIN — Medication 4: at 12:09

## 2023-10-06 RX ADMIN — Medication 1 SPRAY(S): at 01:57

## 2023-10-06 RX ADMIN — Medication 2: at 21:25

## 2023-10-06 RX ADMIN — Medication 1 SPRAY(S): at 17:58

## 2023-10-06 NOTE — DIETITIAN NUTRITION RISK NOTIFICATION - TREATMENT: THE FOLLOWING DIET HAS BEEN RECOMMENDED
Diet, Minced and Moist:   Consistent Carbohydrate {No Snacks} (CSTCHO)  Darinel(7 Gm Arginine/7 Gm Glut/1.2 Gm HMB     Qty per Day:  BID  Supplement Feeding Modality:  Oral  Glucerna Shake Cans or Servings Per Day:  1       Frequency:  Daily (10-06-23 @ 09:20) [Pending Verification By Attending]  Diet, Minced and Moist:   Consistent Carbohydrate {No Snacks} (CSTCHO) (10-05-23 @ 15:44) [Active]      + MVI, Vit C and Zinc

## 2023-10-06 NOTE — DIETITIAN INITIAL EVALUATION ADULT - PERTINENT MEDS FT
MEDICATIONS  (STANDING):  ammonium lactate 12% Lotion 1 Application(s) Topical every 12 hours  artificial  tears Solution 1 Drop(s) Both EYES every 12 hours  atovaquone  Suspension 1500 milliGRAM(s) Oral daily  dextrose 5%. 1000 milliLiter(s) (50 mL/Hr) IV Continuous <Continuous>  dextrose 5%. 1000 milliLiter(s) (100 mL/Hr) IV Continuous <Continuous>  dextrose 50% Injectable 12.5 Gram(s) IV Push once  dextrose 50% Injectable 25 Gram(s) IV Push once  dextrose 50% Injectable 25 Gram(s) IV Push once  enoxaparin Injectable 70 milliGRAM(s) SubCutaneous <User Schedule>  folic acid 1 milliGRAM(s) Oral daily  glucagon  Injectable 1 milliGRAM(s) IntraMuscular once  influenza   Vaccine 0.5 milliLiter(s) IntraMuscular once  insulin lispro (ADMELOG) corrective regimen sliding scale   SubCutaneous Before meals and at bedtime  melatonin 6 milliGRAM(s) Oral at bedtime  methylPREDNISolone 64 milliGRAM(s) Oral daily  metoprolol tartrate 25 milliGRAM(s) Oral two times a day  multivitamin/minerals/iron Oral Solution (CENTRUM) 15 milliLiter(s) Oral daily  nystatin Powder 1 Application(s) Topical every 12 hours  pantoprazole    Tablet 40 milliGRAM(s) Oral before breakfast  senna 2 Tablet(s) Oral at bedtime  zinc oxide 40% Paste 1 Application(s) Topical two times a day    MEDICATIONS  (PRN):  albuterol/ipratropium for Nebulization 3 milliLiter(s) Nebulizer every 6 hours PRN Shortness of Breath and/or Wheezing  aluminum hydroxide/magnesium hydroxide/simethicone Suspension 30 milliLiter(s) Oral every 4 hours PRN Dyspepsia  dextrose Oral Gel 15 Gram(s) Oral once PRN Blood Glucose LESS THAN 70 milliGRAM(s)/deciliter  sodium chloride 0.65% Nasal 1 Spray(s) Both Nostrils every 8 hours PRN Nasal Congestion

## 2023-10-06 NOTE — DIETITIAN INITIAL EVALUATION ADULT - NSFNSPHYEXAMSKINFT_GEN_A_CORE
Pressure Injury 1: Right:, thigh, Stage II  Pressure Injury 2: Right:, inner buttock, Stage II  Pressure Injury 3: Left:, inner buttock , Stage II  Pressure Injury 4: none, none  Pressure Injury 5: none, none  Pressure Injury 6: none, none  Pressure Injury 7: none, none  Pressure Injury 8: none, none  Pressure Injury 9: none, none  Pressure Injury 10: none, none  Pressure Injury 11: none, none

## 2023-10-06 NOTE — DIETITIAN INITIAL EVALUATION ADULT - PERSON TAUGHT/METHOD
Minced and Moist Consistent Carbohydrate, Optimal protein for wound healing/verbal instruction/written material/teach back - (Patient repeats in own words)/patient instructed/significant other instructed

## 2023-10-06 NOTE — CONSULT NOTE ADULT - ASSESSMENT
62 year old female with PMH of pAFIB and sciatica; who presented to [facility] ED with SOB, and was found to have groundglass opacities and interstitial lung disease. She was treated with empiric Vancomycin and Zosyn. She underwent a bronchoscopy with bronchoalveolar lavage and pulse steroids. She was given IV diuretics for increased pulmonary congestion, but her respiratory status continued to worsen.  On 9/13, she was transferred to [facility] for ECMO requirements. She was further treated with Plasmapheresis, Rituximab and high-dose steroids, with significant improvement. Her overall hospital course was complicated by thrombocytopenia (s/p several platelet transfusions), leukocytosis (infectious workup entirely negative- s/p Vanco and Ceftriaxone), AFIB with RVR (resolved with Metoprolol), dysphagia (requiring NGT), transaminitis (secondary to acute illness and hypotension),  non-occlusive RIJ DVT (started on Lovenox). Patient now admitted for a multidisciplinary rehab program- pt/ot/dvt ppx    #Interstitial Lung Disease, s/p ECMO   - s/p Plasmapheresis, Rituximab and high- dose steroids   - Albuterol/Ipratropium Nebulizer every 6 hours  - Mepron   - PO Medrol taper- Plan will be to taper by ~20% every 2 weeks  - Repeat CT Chest in 6 weeks     #pAFIB  - Metoprolol     - DVT: Lovenox 70mg BID  - RIJ DVT found at ECMO cannula     #Transaminitis   - Secondary to acute illness and hypotension at [facility]  - Trend LFTs  - Outpatient follow up     #Sleep  - Melatonin     # vte ppx- on therapeutic Lovenox     will follow  d/w rehab team  time spent in e/m visit- 80 min    62 year old female with PMH of pAFIB and sciatica; who presented to [facility] ED with SOB, and was found to have groundglass opacities and interstitial lung disease. She was treated with empiric Vancomycin and Zosyn. She underwent a bronchoscopy with bronchoalveolar lavage and pulse steroids. She was given IV diuretics for increased pulmonary congestion, but her respiratory status continued to worsen.  On 9/13, she was transferred to [facility] for ECMO requirements. She was further treated with Plasmapheresis, Rituximab and high-dose steroids, with significant improvement. Her overall hospital course was complicated by thrombocytopenia (s/p several platelet transfusions), leukocytosis (infectious workup entirely negative- s/p Vanco and Ceftriaxone), AFIB with RVR (resolved with Metoprolol), dysphagia (requiring NGT), transaminitis (secondary to acute illness and hypotension),  non-occlusive RIJ DVT (started on Lovenox). Patient now admitted for a multidisciplinary rehab program- pt/ot/dvt ppx    #Interstitial Lung Disease, s/p ECMO   - s/p Plasmapheresis, Rituximab and high- dose steroids   - Albuterol/Ipratropium Nebulizer every 6 hours  - Mepron   - PO Medrol taper- Plan will be to taper by ~20% every 2 weeks  - Repeat CT Chest in 6 weeks   - o2 support prn  - pulm and cardio consult    #pAFIB  - Metoprolol     - DVT: Lovenox 70mg BID  - RIJ DVT found at ECMO cannula     #Transaminitis   - Secondary to acute illness and hypotension at [facility]  - Trend LFTs  - Outpatient follow up     #Sleep  - Melatonin     # vte ppx- on therapeutic Lovenox     will follow  d/w rehab team  time spent in e/m visit- 80 min

## 2023-10-06 NOTE — DIETITIAN INITIAL EVALUATION ADULT - ORAL NUTRITION SUPPLEMENTS
Glucerna 8oz PO Daily (Provides 220kcal-10grams of Protein), Darinel 1 Packet BID (Provides 180kcal & 28grams of Protein)

## 2023-10-06 NOTE — CONSULT NOTE ADULT - ASSESSMENT
62F PMH A-Fib with recently diagnosed MCTD-ILD (+ARIANA 1:1280, RNP>8, Sm>8) who was admitted to [facility] with acute hypoxemic respiratory failure that initially responded to steroids, then with worsening after taper with development of acute hypoxemic and hypercapnic respiratory failure requiring intubation and VV- ECMO on 9/13, then transferred to [facility], concerning for DAH vs ILD flare, decannulated from VV-ECMO 9/21, extubated 9/22. S/p pulse steroids, PLEX (9/15, 9/18, 9/20) and Rituximab (9/16 & 10/3). Course c/b severe leukopenia s/p filgastrim, shock liver with slow to resolved hyperbilirubinemia, RIJ DVT, oropharyngeal dysphagia, and recurrent SVT. Repeat CT chest on 9/30 with markedly improved bilateral groundglass opacities with resolved lung consolidations. Now in acute rehab on 4 lpm NC oxygen and tapering dose of medrol.     Assessment:  1. Interstitial lung disease   2. Mixed connective tissue disease   3. Acute hypoxia  4. RIJ DVT     Plan:  - Stable respiratory status, on NC 4 LPM at rest, can increase to 5-6 LPM with activity. Goal SpO2>92%  - Cont. Methylprednisolone PO, taper as per rheumatology recs. from [facility]   - Continue atovaquone for PCP prophylaxis  - S/p Rituximab 9/16 and 10/3  - S/p PLEX during MICU stay  - Repeat CT scan in 6 weeks  - No evidence of active infection, monitor off antibiotics  - Cont. anticoagulation for DVT associated with ECMO cannula  - Consider incentive spirometry   - Care per primary team  - Discussed with spouse at bedside   - Outpatient pulmonary and rheumatology follow up upon discharge

## 2023-10-06 NOTE — CONSULT NOTE ADULT - SUBJECTIVE AND OBJECTIVE BOX
Initial HPI on admission:  HPI:  Zo Hager is a 62 year old female with PMH of pAFIB and sciatica; who presented to [facility] ED with SOB. She has been experiencing chronic SOB and non-productive cough for several months and was worked up in [place] where she had a CT scan which resulted negative.  She has been evaluated by Pulmonology and Rheumatology and was ruled out for lupus and immunological disorders.  She recently went to Urgent Care due to SOB with ambulating short distances (12-15 feet), and an XR was concerning for BL LL infiltrates. While at [facility],  CXR and CTA were significant for ground glass opacities, and interstitial lung disease, respectively. She was treated with empiric Vancomycin and Zosyn. She underwent a bronchoscopy with bronchoalveolar lavage and pulse steroids. She was given IV diuretics for increased pulmonary congestion, but her respiratory status continued to worsen.  On 9/13, she was transferred to [facility] for ECMO requirements. She was further treated with Plasmapheresis, Rituximab and high-dose steroids, with significant improvement.   Her overall hospital course was complicated by thrombocytopenia (s/p several platelet transfusions), leukocytosis (infectious workup entirely negative- s/p Vanco and Ceftriaxone), AFIB with RVR (resolved with Metoprolol), dysphagia (requiring NGT), transaminitis (secondary to acute illness and hypotension),  non-occlusive RIJ DVT (started on Lovenox). PM&R was consulted and deemed her an appropriate candidate for IRF. She was medically stabilized and cleared for discharge to [facility].  (05 Oct 2023 13:13)      BRIEF HOSPITAL COURSE: Patient seen at bedside in wheelchair. On NC oxygen 4lpm oxygen. States breathing is comfortable. Endorsing pain at sacral wound site. Denies any history of childhood pulmonary diseases. States she is a lifelong non smoker    PAST MEDICAL & SURGICAL HISTORY:  Afib      Sciatica        Allergies    No Known Allergies    Intolerances    lactose (Unknown)    FAMILY HISTORY:    Social History:  SOCIAL HISTORY  Smoking - Denied  EtOH - Denied   Drugs - Denied    FUNCTIONAL HISTORY  Lives with sig other, has elevator apt in [place], [facility] on [address], [place], working as executive    Independent AMB and ADLs PTA   CURRENT FUNCTIONAL STATUS  10/2 SLP/MBS  mild oral dysphagia/mild pharyngeal dysphagia  minced moist solids, thin liquids    10/2 PT  transfers dependent with lift     9/26 OT  bed mobiilty max assist x 2 (05 Oct 2023 13:13)      Review of Systems:  Negative except for above    Medications:  albuterol/ipratropium for Nebulization 3 milliLiter(s) Nebulizer every 6 hours PRN Shortness of Breath and/or Wheezing  aluminum hydroxide/magnesium hydroxide/simethicone Suspension 30 milliLiter(s) Oral every 4 hours PRN Dyspepsia  ammonium lactate 12% Lotion 1 Application(s) Topical every 12 hours  artificial  tears Solution 1 Drop(s) Both EYES every 12 hours  ascorbic acid 500 milliGRAM(s) Oral daily  atovaquone  Suspension 1500 milliGRAM(s) Oral daily  dextrose 5%. 1000 milliLiter(s) (50 mL/Hr) IV Continuous <Continuous>  dextrose 5%. 1000 milliLiter(s) (100 mL/Hr) IV Continuous <Continuous>  dextrose 50% Injectable 25 Gram(s) IV Push once  dextrose 50% Injectable 25 Gram(s) IV Push once  dextrose 50% Injectable 12.5 Gram(s) IV Push once  dextrose Oral Gel 15 Gram(s) Oral once PRN Blood Glucose LESS THAN 70 milliGRAM(s)/deciliter  enoxaparin Injectable 70 milliGRAM(s) SubCutaneous <User Schedule>  folic acid 1 milliGRAM(s) Oral daily  glucagon  Injectable 1 milliGRAM(s) IntraMuscular once  influenza   Vaccine 0.5 milliLiter(s) IntraMuscular once  insulin lispro (ADMELOG) corrective regimen sliding scale   SubCutaneous Before meals and at bedtime  melatonin 6 milliGRAM(s) Oral at bedtime  methylPREDNISolone 64 milliGRAM(s) Oral daily  metoprolol tartrate 25 milliGRAM(s) Oral two times a day  multivitamin/minerals/iron Oral Solution (CENTRUM) 15 milliLiter(s) Oral daily  nystatin Powder 1 Application(s) Topical every 12 hours  pantoprazole    Tablet 40 milliGRAM(s) Oral before breakfast  senna 2 Tablet(s) Oral at bedtime  sodium chloride 0.65% Nasal 1 Spray(s) Both Nostrils every 8 hours PRN Nasal Congestion  sodium chloride 0.9% lock flush 5 milliLiter(s) IV Push two times a day  zinc oxide 40% Paste 1 Application(s) Topical two times a day  zinc sulfate 220 milliGRAM(s) Oral daily    MEDICATIONS  (STANDING):  ammonium lactate 12% Lotion 1 Application(s) Topical every 12 hours  artificial  tears Solution 1 Drop(s) Both EYES every 12 hours  ascorbic acid 500 milliGRAM(s) Oral daily  atovaquone  Suspension 1500 milliGRAM(s) Oral daily  dextrose 5%. 1000 milliLiter(s) (50 mL/Hr) IV Continuous <Continuous>  dextrose 5%. 1000 milliLiter(s) (100 mL/Hr) IV Continuous <Continuous>  dextrose 50% Injectable 12.5 Gram(s) IV Push once  dextrose 50% Injectable 25 Gram(s) IV Push once  dextrose 50% Injectable 25 Gram(s) IV Push once  enoxaparin Injectable 70 milliGRAM(s) SubCutaneous <User Schedule>  folic acid 1 milliGRAM(s) Oral daily  glucagon  Injectable 1 milliGRAM(s) IntraMuscular once  influenza   Vaccine 0.5 milliLiter(s) IntraMuscular once  insulin lispro (ADMELOG) corrective regimen sliding scale   SubCutaneous Before meals and at bedtime  melatonin 6 milliGRAM(s) Oral at bedtime  methylPREDNISolone 64 milliGRAM(s) Oral daily  metoprolol tartrate 25 milliGRAM(s) Oral two times a day  multivitamin/minerals/iron Oral Solution (CENTRUM) 15 milliLiter(s) Oral daily  nystatin Powder 1 Application(s) Topical every 12 hours  pantoprazole    Tablet 40 milliGRAM(s) Oral before breakfast  senna 2 Tablet(s) Oral at bedtime  sodium chloride 0.9% lock flush 5 milliLiter(s) IV Push two times a day  zinc oxide 40% Paste 1 Application(s) Topical two times a day  zinc sulfate 220 milliGRAM(s) Oral daily    MEDICATIONS  (PRN):  albuterol/ipratropium for Nebulization 3 milliLiter(s) Nebulizer every 6 hours PRN Shortness of Breath and/or Wheezing  aluminum hydroxide/magnesium hydroxide/simethicone Suspension 30 milliLiter(s) Oral every 4 hours PRN Dyspepsia  dextrose Oral Gel 15 Gram(s) Oral once PRN Blood Glucose LESS THAN 70 milliGRAM(s)/deciliter  sodium chloride 0.65% Nasal 1 Spray(s) Both Nostrils every 8 hours PRN Nasal Congestion      Antibiotics History  atovaquone  Suspension 1500 milliGRAM(s) Oral daily, 10-06-23 @ 00:00      Heme Medications   enoxaparin Injectable 70 milliGRAM(s) SubCutaneous <User Schedule>, 10-05-23 @ 18:55      GI Medications  aluminum hydroxide/magnesium hydroxide/simethicone Suspension 30 milliLiter(s) Oral every 4 hours, 10-05-23 @ 18:55, Routine PRN  pantoprazole    Tablet 40 milliGRAM(s) Oral before breakfast, 10-06-23 @ 00:00, Routine  senna 2 Tablet(s) Oral at bedtime, 10-05-23 @ 20:11, Routine        Home Medications:  Last Order Reconciliation Date: Not Done  aluminum hydroxide-magnesium hydroxide 200 mg-200 mg/5 mL oral suspension: 30 milliliter(s) orally every 4 hours As needed Dyspepsia (10-05-23 @ 14:13)  Antioxidant Formula oral tablet: 1 tablet with sensor orally once a day (10-05-23 @ 14:13)  atovaquone 750 mg/5 mL oral suspension: 10 milliliter(s) orally once a day (10-05-23 @ 14:13)  enoxaparin: 70 milligram(s) subcutaneous every 12 hours (10-05-23 @ 14:13)  folic acid 1 mg oral tablet: 1 tab(s) orally once a day (10-05-23 @ 14:13)  gabapentin 100 mg oral capsule: 1 orally 3 times a day (10-05-23 @ 14:13)  insulin lispro 100 units/mL injectable solution: injectable 4 times a day (before meals and at bedtime) Low-dose insulin sliding scale (10-05-23 @ 15:41)  ipratropium-albuterol 0.5 mg-2.5 mg/3 mL inhalation solution: 3 milliliter(s) inhaled every 6 hours as needed for  shortness of breath and/or wheezing (10-05-23 @ 14:13)  Medrol 16 mg oral tablet: 2 tab(s) orally once a day (10-05-23 @ 15:41)  Medrol 32 mg oral tablet: 2 tab(s) orally once a day (10-05-23 @ 15:41)  melatonin 3 mg oral tablet: 2 tab(s) orally once a day (at bedtime) (10-05-23 @ 14:13)  methylPREDNISolone 32 mg oral tablet: 1 tab(s) orally once a day (09-27-23 @ 15:29)  metoprolol tartrate 25 mg oral tablet: 1 tab(s) orally 2 times a day Hold for HR &lt;60 or Systolic BP &lt;100 (10-05-23 @ 14:13)  mycophenolate mofetil 250 mg oral capsule: 2 cap(s) orally every 12 hours (10-05-23 @ 14:13)  ocular lubricant ophthalmic solution: 1 drop(s) to each affected eye every 12 hours (10-05-23 @ 14:13)  predniSONE 20 mg oral tablet: 1 orally 2 times a day (09-09-23 @ 20:20)  Protonix 40 mg oral delayed release tablet: 1 tab(s) orally once a day (before a meal) (10-05-23 @ 14:13)  senna (sennosides) 8.6 mg oral tablet: 2 tab(s) orally once a day (at bedtime) (10-05-23 @ 14:13)  sodium chloride 0.65% nasal spray: 1 spray(s) nasal every 4 hours as needed for  dry nasal passages (10-05-23 @ 14:13)      LABS:                        9.9    12.49 )-----------( 208      ( 06 Oct 2023 05:25 )             32.0     10-06    137  |  101  |  22  ----------------------------<  124<H>  3.7   |  29  |  0.46<L>    Ca    8.9      06 Oct 2023 05:25  Phos  2.9     10-05  Mg     2.10     10-05    TPro  5.0<L>  /  Alb  2.2<L>  /  TBili  8.2<H>  /  DBili  x   /  AST  90<H>  /  ALT  181<H>  /  AlkPhos  496<H>  10-06              Urinalysis Basic - ( 06 Oct 2023 05:25 )    Color: x / Appearance: x / SG: x / pH: x  Gluc: 124 mg/dL / Ketone: x  / Bili: x / Urobili: x   Blood: x / Protein: x / Nitrite: x   Leuk Esterase: x / RBC: x / WBC x   Sq Epi: x / Non Sq Epi: x / Bacteria: x              CULTURES: (if applicable)    Culture - Blood (collected 09-27-23 @ 19:50)  Source: .Blood Blood-Peripheral  Final Report (10-03-23 @ 01:00):    No growth at 5 days    Culture - Urine (collected 09-27-23 @ 18:26)  Source: Catheterized Catheterized  Final Report (09-28-23 @ 21:25):    No growth    Culture - Blood (collected 09-27-23 @ 14:30)  Source: .Blood Blood-Peripheral  Final Report (10-02-23 @ 20:01):    No growth at 5 days      Rapid RVP Result: NotDetec (09-30-23 @ 12:52)        CAPILLARY BLOOD GLUCOSE      POCT Blood Glucose.: 212 mg/dL (06 Oct 2023 12:06)      RADIOLOGY  CXR:      CT:    ECHO:      VITALS:  T(C): 36.4 (10-06-23 @ 07:30), Max: 36.8 (10-05-23 @ 16:00)  T(F): 97.6 (10-06-23 @ 07:30), Max: 98.2 (10-05-23 @ 16:00)  HR: 80 (10-06-23 @ 07:30) (72 - 85)  BP: 103/60 (10-06-23 @ 07:30) (103/60 - 124/75)  BP(mean): --  ABP: --  ABP(mean): --  RR: 16 (10-06-23 @ 07:30) (16 - 16)  SpO2: 95% (10-06-23 @ 07:30) (95% - 98%)  CVP(mm Hg): --  CVP(cm H2O): --    Ins and Outs     10-05-23 @ 07:01  -  10-06-23 @ 07:00  --------------------------------------------------------  IN: 0 mL / OUT: 150 mL / NET: -150 mL        Height (cm): 172.7 (10-05-23 @ 18:51)  Weight (kg): 116.4 (10-05-23 @ 18:51)  BMI (kg/m2): 39 (10-05-23 @ 18:51)        I&O's Detail    05 Oct 2023 07:01  -  06 Oct 2023 07:00  --------------------------------------------------------  IN:  Total IN: 0 mL    OUT:    Voided (mL): 150 mL  Total OUT: 150 mL    Total NET: -150 mL    Physical Examination:  GENERAL:                Alert, Oriented, No acute distress.    HEENT:                    Pupils equal, reactive to light.  Symmetric. No JVD, Moist MM  PULM:                     Bilateral air entry,  poor air entry at bases No Rales, No Rhonchi, No Wheezing  CVS:                         S1, S2,  No Murmur  ABD:                        Soft, nondistended, nontender, normoactive bowel sounds,   EXT:                        Trace non pitting edema, nontender, No Cyanosis or Clubbing   Vascular:                 Warm Extremities, Normal Capillary refill, Normal Distal Pulses  NEURO:                  Alert, oriented, interactive, bilateral upper and lower extremity weakness   PSYC:                      Calm, + Insight.

## 2023-10-06 NOTE — DIETITIAN INITIAL EVALUATION ADULT - ORAL INTAKE PTA/DIET HISTORY
Patient previously NPO w/ tube feeding upgraded to pureed diet during previous hospitalization. Per patient report w/ reduced PO intake and weight loss x 3 weeks. NKFA nor food intolerances reported. W/ Lactose intolerance; avoids milk, (yogurt, small amounts of cheese, ice cream ok) nor food intolerances reported

## 2023-10-06 NOTE — CONSULT NOTE ADULT - SUBJECTIVE AND OBJECTIVE BOX
62 year old female with PMH of pAFIB and sciatica; who presented to [facility] ED with SOB. She has been experiencing chronic SOB and non-productive cough for several months and was worked up in [place] where she had a CT scan which resulted negative.  She has been evaluated by Pulmonology and Rheumatology and was ruled out for lupus and immunological disorders.  She recently went to Urgent Care due to SOB with ambulating short distances (12-15 feet), and an XR was concerning for BL LL infiltrates. While at [facility],  CXR and CTA were significant for ground glass opacities, and interstitial lung disease, respectively. She was treated with empiric Vancomycin and Zosyn. She underwent a bronchoscopy with bronchoalveolar lavage and pulse steroids. She was given IV diuretics for increased pulmonary congestion, but her respiratory status continued to worsen.  On 9/13, she was transferred to [facility] for ECMO requirements. She was further treated with Plasmapheresis, Rituximab and high-dose steroids, with significant improvement.   Her overall hospital course was complicated by thrombocytopenia (s/p several platelet transfusions), leukocytosis (infectious workup entirely negative- s/p Vanco and Ceftriaxone), AFIB with RVR (resolved with Metoprolol), dysphagia (requiring NGT), transaminitis (secondary to acute illness and hypotension),  non-occlusive RIJ DVT (started on Lovenox). PM&R was consulted and deemed her an appropriate candidate for IRF. She was medically stabilized and cleared for discharge to [facility].     seen at the bedside, c/o feeling tired and pain in the buttocks, no headache, no sob, no CP, no dizziness.     Review of Systems: per hpi      Allergies and Intolerances:        Allergies:  	No Known Allergies:     Home Medications:   * Incomplete Medication History as of 11-Sep-2023 11:37 documented in Structured Notes  · 	Medrol 16 mg oral tablet: 2 tab(s) orally once a day  · 	Dexcom G7 [place]: use as instructed  · 	mycophenolate mofetil 250 mg oral capsule: 2 cap(s) orally every 12 hours  · 	Dexcom G7 Sensors: 3 applicators, change every 10 days  · 	gabapentin 100 mg oral capsule: 1 orally 3 times a day    .    PAST MEDICAL HISTORY:  Afib     Sciatica.    SOCIAL HISTORY  Smoking - Denied  EtOH - Denied   Drugs - Denied      PHYSICAL EXAM    Vital Signs Last 24 Hrs  T(C): 36.4 (06 Oct 2023 07:30), Max: 36.8 (05 Oct 2023 16:00)  T(F): 97.6 (06 Oct 2023 07:30), Max: 98.2 (05 Oct 2023 16:00)  HR: 80 (06 Oct 2023 07:30) (72 - 85)  BP: 103/60 (06 Oct 2023 07:30) (103/60 - 124/75)  BP(mean): --  RR: 16 (06 Oct 2023 07:30) (16 - 16)  SpO2: 95% (06 Oct 2023 07:30) (95% - 98%)    Parameters below as of 06 Oct 2023 07:30  Patient On (Oxygen Delivery Method): nasal cannula  O2 Flow (L/min): 4      GENERAL- NAD  EAR/NOSE/MOUTH/THROAT -  MMM  EYES- MADAI, conjunctiva and Sclera clear  NECK- supple  RESPIRATORY-  clear to auscultation bilaterally, non laboured breathing  CARDIOVASCULAR - SIS2, RRR  GI - soft NT BS present  EXTREMITIES-  b/l LE edema  NEUROLOGY-  b/l UE AND LE weakness  PSYCHIATRY- AAO X 3         Labs and Results:                  9.9                  137  | 29   | 22           12.49 >-----------< 208     ------------------------< 124                   32.0                 3.7  | 101  | 0.46                                         Ca 8.9   Mg x     Ph x        CAPILLARY BLOOD GLUCOSE      POCT Blood Glucose.: 140 mg/dL (06 Oct 2023 07:56)  POCT Blood Glucose.: 141 mg/dL (05 Oct 2023 21:06)  POCT Blood Glucose.: 179 mg/dL (05 Oct 2023 16:50)  POCT Blood Glucose.: 277 mg/dL (05 Oct 2023 11:39)      A1C with Estimated Average Glucose Result: 6.1: Reference Range                 4.0-5.6%       High risk (prediabetic)        5.7-6.4%       Diabetic, diagnostic             >=6.5%       ADA diabetic treatment goal       <7.0%  Reference ranges are based upon the 2010 recommendations of the American  Diabetes Association.  Interpretation may vary for children and  adolescents. % (08.27.23 @ 05:30)      Labs reviewed:     CXR personally reviewed: < from: CT Abdomen and Pelvis w/ IV Cont (09.30.23 @ 15:21) >  IMPRESSION:  1.  Bilateral ground glass opacities consistent with pulmonary edema,   markedly improved from the previously seen extensive lung consolidations   on 9/14/2023.  2.  Interval removal of ECMO catheters. No right groin abscess or   evidence of infection at prior ECMO calculation site.    < end of copied text >      ECG reviewed and interpreted: < from: TTE W or WO Ultrasound Enhancing Agent (10.04.23 @ 10:58) >      1. Left ventricular cavity is normally sized. Left ventricular wall thickness is normal. Left ventricular systolic function is normal with an ejection fraction of 64 % by Barnes's method of disks.   2. Normal right ventricular cavity size, wall thickness and systolic function.   3. There is trace tricuspid regurgitation. There is insufficient tricuspid regurgitation detected to calculate pulmonary artery systolic pressure.   4. There is mild aortic regurgitation.   5. No pericardial effusion seen.   6. Compared to the transthoracic echocardiogram performed on 9/19/2023 there have been no significant interval changes.   7. Normal left ventricular diastolic function, with normal filling pressure.    < end of copied text >      MAGING/RADIOLOGY:  CT CHEST/ CT AP (9/30)  IMPRESSION:  1.  Bilateral groundglass opacities consistent with pulmonary edema, markedly improved from the previously seen extensive lung consolidations on 9/14/2023.  2.  Interval removal of ECMO catheters. No right groin abscess or evidence of infection at prior ECMO calculation site.    XR- CHEST & ABDOMEN (9/19)  Impression:  *  Diffuse bilateral hazy opacities consistent with patient'shistory of ARDS. Left pleural effusion and trace right pleural effusion.  *  Nonobstructive bowel gas pattern.    CT CHEST/CT AP (9/14)  IMPRESSION:  Airway secretions and large bilateral airspace consolidation, new since 9/11/2023. Small bilateral pleural effusions.  Interval ECMO placement.  Trace abdominopelvic ascites.    CTA CHEST PE PROTOCOL (9/11)  IMPRESSION:  1.  Improved/decreased extent of bilateral groundglass opacities and reticular markings compared to the previous study. Findings are   nonspecific but differential etiologies include interstitial pneumonia, drug toxicity, nonspecific connective tissue disorders.  2.  New small focus of parenchymal consolidation seen in the lingula; possible small focal pneumonia.  3.  No pulmonary embolism    CT CHEST (8/28)   IMPRESSION:  1. Since August 26, 2023, again interstitial lung disease non-UIP pattern. Although no subpleural sparing, possibly interstitial pneumonia, differential includes NSIP associated with connective tissue disorders, chronic HP or drug toxicity.        MEDICATIONS  (STANDING):  ammonium lactate 12% Lotion 1 Application(s) Topical every 12 hours  artificial  tears Solution 1 Drop(s) Both EYES every 12 hours  atovaquone  Suspension 1500 milliGRAM(s) Oral daily  enoxaparin Injectable 70 milliGRAM(s) SubCutaneous <User Schedule>  folic acid 1 milliGRAM(s) Oral daily  glucagon  Injectable 1 milliGRAM(s) IntraMuscular once  influenza   Vaccine 0.5 milliLiter(s) IntraMuscular once  insulin lispro (ADMELOG) corrective regimen sliding scale   SubCutaneous Before meals and at bedtime  melatonin 6 milliGRAM(s) Oral at bedtime  methylPREDNISolone 64 milliGRAM(s) Oral daily  metoprolol tartrate 25 milliGRAM(s) Oral two times a day  multivitamin/minerals/iron Oral Solution (CENTRUM) 15 milliLiter(s) Oral daily  nystatin Powder 1 Application(s) Topical every 12 hours  pantoprazole    Tablet 40 milliGRAM(s) Oral before breakfast  senna 2 Tablet(s) Oral at bedtime  zinc oxide 40% Paste 1 Application(s) Topical two times a day    MEDICATIONS  (PRN):  albuterol/ipratropium for Nebulization 3 milliLiter(s) Nebulizer every 6 hours PRN Shortness of Breath and/or Wheezing  aluminum hydroxide/magnesium hydroxide/simethicone Suspension 30 milliLiter(s) Oral every 4 hours PRN Dyspepsia  dextrose Oral Gel 15 Gram(s) Oral once PRN Blood Glucose LESS THAN 70 milliGRAM(s)/deciliter  sodium chloride 0.65% Nasal 1 Spray(s) Both Nostrils every 8 hours PRN Nasal Congestion

## 2023-10-06 NOTE — DIETITIAN NUTRITION RISK NOTIFICATION - MALNUTRITION EVALUATION AS DEMONSTRATED BY (ADULTS > 20 YEARS OF AGE)
Weight loss.../Inadequate energy intake.../Loss of subcutaneous fat.../Loss of muscle.../Fluid accumulation...
No

## 2023-10-06 NOTE — CHART NOTE - NSCHARTNOTEFT_GEN_A_CORE
[facility] Interdisciplinary Plan of Care    REHABILITATION DIAGNOSIS:  Interstitial lung disease with respiratory failure       COMORBIDITIES/COMPLICATING CONDITIONS IMPACTING REHABILITATION:  HEALTH ISSUES - PROBLEM Dx:    Obesity  A fib  Lymphedema  Pressure injuries      PAST MEDICAL & SURGICAL HISTORY:  Afib      Sciatica          Based upon consideration of the patient's impairments, functional status, complicating conditions and any other contributing factors and after information garnered from the assessments of all therapy disciplines involved in treating the patient and other pertinent clinicians:    INTERDISCIPLINARY REHABILITATION INTERVENTIONS:    [ X  ] Transfer Training  [ X  ] Bed Mobility  [ X  ] Therapeutic Exercise  [ X ] Balance/Coordination Exercises  [ X ] Locomotion retraining  [ X  ] Stairs  [  X ] Functional Transfer Training  [   ] Bowel/Bladder program  [  x ] Pain Management  [   ] Skin/Wound Care  [   ] Visual/Perceptual Training  [  x ] Therapeutic Recreation Activities  [ x  ] Neuromuscular Re-education  [ X  ] Activities of Daily Living  [   ] Speech Exercise  [   ] Swallowing Exercises  [   ] Vital Stim  [   ] Dietary Supplements  [   ] Calorie Count  [   ] Cognitive Exercises  [   ] Congnitive/Linguistic Treatment  [   ] Behavior Program  [   ] Neuropsych Therapy  [ X  ] Patient/Family Counseling  [ X ] Family Training  [ X  ] Community Re-entry  [   ] Orthotic Evaluation  [   ] Prosthetic Eval/Training    MEDICAL PROGNOSIS:  Fair     REHAB POTENTIAL:  Fair  EXPECTED DAILY THERAPY:         PT: 1 HR       OT: 1 hr        ST: 1 HR        P&O:    EXPECTED INTENSITY OF PROGRAM:  3 hrs / Day    EXPECTED FREQUENCY OF PROGRAM: 5 Days/ Week    ESTIMATED LOS:  [  ] 5-7 Days  [  ] 7-10 Days  [  ] 10- 14 Days  [  ] 14- 18 Days  [X] 18- 21 Days    ESTIMATED DISPOSITION:  [  ] Home   [ X ] Home with Outpatient Therapies  [  ] Home with Home Therapies  [  ] Assisted Living  [  ] Nursing Home  [  ] Long Term Acute Care    INTERDISCIPLINARY FUNCTIONAL OUTCOMES/GOALS:         Gait/Mobility: 4 with device        Transfers: 4       ADLs: 4       Functional Transfers: 4       Medication Management: 6       Communication: 6       Cognitive: 6       Dysphagia: 6       Bladder 6       Bowel: 6     Functional Independent Measures:   7 = Independent  6 = Modified Independent  5 = Supervision  4 = Minimal Assist/ Contact Guard  3 = Moderate Assistance  2 = Maximum Assistance  1 = Total Assistance  0 = Unable to assess

## 2023-10-06 NOTE — DIETITIAN INITIAL EVALUATION ADULT - OTHER INFO
Reason for Admission: Interstitial Lung Disease  History of Present Illness:   Zo Hager is a 62 year old female with PMH of pAFIB and sciatica; who presented to [facility] ED with SOB. She has been experiencing chronic SOB and non-productive cough for several months and was worked up in [place] where she had a CT scan which resulted negative.  She has been evaluated by Pulmonology and Rheumatology and was ruled out for lupus and immunological disorders.  She recently went to Urgent Care due to SOB with ambulating short distances (12-15 feet), and an XR was concerning for BL LL infiltrates. While at [facility],  CXR and CTA were significant for ground glass opacities, and interstitial lung disease, respectively. She was treated with empiric Vancomycin and Zosyn. She underwent a bronchoscopy with bronchoalveolar lavage and pulse steroids. She was given IV diuretics for increased pulmonary congestion, but her respiratory status continued to worsen.  On 9/13, she was transferred to [facility] for ECMO requirements. She was further treated with Plasmapheresis, Rituximab and high-dose steroids, with significant improvement.   Her overall hospital course was complicated by thrombocytopenia (s/p several platelet transfusions), leukocytosis (infectious workup entirely negative- s/p Vanco and Ceftriaxone), AFIB with RVR (resolved with Metoprolol), dysphagia (requiring NGT), transaminitis (secondary to acute illness and hypotension),  non-occlusive RIJ DVT (started on Lovenox). PM&R was consulted and deemed her an appropriate candidate for IRF. She was medically stabilized and cleared for discharge to [facility].     Visited w/ patient and partner at bedside. Reports reduced intake on Minced and Moist diet from usual. Reviewed preferences and menu alternatives noted in [facility]. Glucerna 8oz PO Daily (Provides 220kcal-10grams of Protein) QD to assist patient in meeting estimated energy requirements. No issues w/ dentition or chewing and swallowing current diet texture. Denies N/V/C/D, last BM 10/5 Per nursing flowsheets. W/ 25.3lb weight loss x 3 weeks, .3lb CBW 256lb. W/ observed subcutaneous fat loss and muscle depletion. . W/ multiple Pressure Injuries; Pressure Injury 1: Right:, thigh, Stage II, Pressure Injury 2: Right:, inner buttock, Stage II, Pressure Injury 3: Left:, inner buttock , Stage II, Recommend Darinel BID, +MVI,  Vit C (500 mg) & Zinc (220mg x 10 days) supplementation to aid in wound healing, when medically feasible. Noted A1C 6.1% POCT 138-277mg/dL in last 24 hours no Hx of DM per patient, Elevated POCT likely 2/2 recent steroid use. Provided education on Consistent Carbohydrate meal pattern + written material.

## 2023-10-06 NOTE — DIETITIAN INITIAL EVALUATION ADULT - ETIOLOGY
decreased ability to meet estimated energy needs  related to increased physiological demands for wound healing

## 2023-10-06 NOTE — DIETITIAN INITIAL EVALUATION ADULT - PERTINENT LABORATORY DATA
10-06    137  |  101  |  22  ----------------------------<  124<H>  3.7   |  29  |  0.46<L>    Ca    8.9      06 Oct 2023 05:25  Phos  2.9     10-05  Mg     2.10     10-05    TPro  5.0<L>  /  Alb  2.2<L>  /  TBili  8.2<H>  /  DBili  x   /  AST  90<H>  /  ALT  181<H>  /  AlkPhos  496<H>  10-06  POCT Blood Glucose.: 140 mg/dL (10-06-23 @ 07:56)  A1C with Estimated Average Glucose Result: 6.1 % (08-27-23 @ 05:30)

## 2023-10-06 NOTE — DIETITIAN INITIAL EVALUATION ADULT - ADD RECOMMEND
1. Continue Minced and Moist Consistent Carbohydrate diet.   2. Recommend Glucerna 8oz PO Daily (Provides 220kcal-10grams of Protein) QD to assist pt in meeting estimated energy needs.  3. Recommend Darinel BID, +MVI,  Vit C (500 mg) & Zinc (220mg x 10 days) supplementation to aid in wound healing, when medically feasible   4. Monitor PO intake, GI tolerance, skin integrity, labs, weight, and bowel movement regularity.   5. Honor food preferences as feasible. Assist with meals PRN and encourage PO intake.  6. Follow SLP recommendations  7. Provide ongoing diet education as needed  8. RD remains available upon request and will follow-up per protocol

## 2023-10-06 NOTE — DIETITIAN INITIAL EVALUATION ADULT - SIGNS/SYMPTOMS
as evidenced by stage 2 pressure injury; thigh, right inner buttock, left inner buttock   as evidenced by 25.3lb weight loss x 3 weeks, subcutaneous fat loss, muscle depletion, Edema

## 2023-10-06 NOTE — CONSULT NOTE ADULT - SUBJECTIVE AND OBJECTIVE BOX
Patient is a 62y old  Female who presents with a chief complaint of Interstitial Lung Disease (06 Oct 2023 12:54)      HPI:  Zo Hager is a 62 year old female with PMH of pAFIB and sciatica; who presented to [facility] ED with SOB. She has been experiencing chronic SOB and non-productive cough for several months and was worked up in [place] where she had a CT scan which resulted negative.  She has been evaluated by Pulmonology and Rheumatology and was ruled out for lupus and immunological disorders.  She recently went to Urgent Care due to SOB with ambulating short distances (12-15 feet), and an XR was concerning for BL LL infiltrates. While at [facility],  CXR and CTA were significant for ground glass opacities, and interstitial lung disease, respectively. She was treated with empiric Vancomycin and Zosyn. She underwent a bronchoscopy with bronchoalveolar lavage and pulse steroids. She was given IV diuretics for increased pulmonary congestion, but her respiratory status continued to worsen.  On 9/13, she was transferred to [facility] for ECMO requirements. She was further treated with Plasmapheresis, Rituximab and high-dose steroids, with significant improvement.   Her overall hospital course was complicated by thrombocytopenia (s/p several platelet transfusions), leukocytosis (infectious workup entirely negative- s/p Vanco and Ceftriaxone), AFIB with RVR (resolved with Metoprolol), dysphagia (requiring NGT), transaminitis (secondary to acute illness and hypotension),  non-occlusive RIJ DVT (started on Lovenox). PM&R was consulted and deemed her an appropriate candidate for IRF. She was medically stabilized and cleared for discharge to [facility].  (05 Oct 2023 13:13)      Interval Events:  Called to see and evaluate Mrs. Hager for dried blood in both nostrils and nasal congestion.  Patient reports that because of her recent history and being on blood thinners, she had an epistaxis episode ah [facility].  She denies any active bleeding, +congestion.    PAST MEDICAL & SURGICAL HISTORY:  Afib  Sciatica      Allergies  No Known Allergies    Intolerances  lactose (Unknown)      Social History:  SOCIAL HISTORY  Smoking - Denied  EtOH - Denied   Drugs - Denied      REVIEW OF SYSTEMS:     CONSTITUTIONAL: No weakness, fevers or chills     EYES/ENT: No visual changes;  No vertigo or throat pain, +dry mouth, +soft voice     NECK: No pain or stiffness     RESPIRATORY: No cough, wheezing, hemoptysis; No shortness of breath     CARDIOVASCULAR: No chest pain or palpitations     GASTROINTESTINAL: No abdominal or epigastric pain. No nausea, vomiting, or hematemesis; No diarrhea or constipation. No melena or hematochezia.     GENITOURINARY: No dysuria, frequency or hematuria     NEUROLOGICAL: No numbness or weakness     SKIN: No itching, burning, rashes, or lesions   All other review of systems is negative unless indicated above.    MEDICATIONS  (STANDING):  ammonium lactate 12% Lotion 1 Application(s) Topical every 12 hours  artificial  tears Solution 1 Drop(s) Both EYES every 12 hours  ascorbic acid 500 milliGRAM(s) Oral daily  atovaquone  Suspension 1500 milliGRAM(s) Oral daily  dextrose 5%. 1000 milliLiter(s) (100 mL/Hr) IV Continuous <Continuous>  dextrose 5%. 1000 milliLiter(s) (50 mL/Hr) IV Continuous <Continuous>  dextrose 50% Injectable 12.5 Gram(s) IV Push once  dextrose 50% Injectable 25 Gram(s) IV Push once  dextrose 50% Injectable 25 Gram(s) IV Push once  enoxaparin Injectable 70 milliGRAM(s) SubCutaneous <User Schedule>  folic acid 1 milliGRAM(s) Oral daily  glucagon  Injectable 1 milliGRAM(s) IntraMuscular once  influenza   Vaccine 0.5 milliLiter(s) IntraMuscular once  insulin lispro (ADMELOG) corrective regimen sliding scale   SubCutaneous Before meals and at bedtime  melatonin 6 milliGRAM(s) Oral at bedtime  methylPREDNISolone 64 milliGRAM(s) Oral daily  metoprolol tartrate 25 milliGRAM(s) Oral two times a day  multivitamin/minerals/iron Oral Solution (CENTRUM) 15 milliLiter(s) Oral daily  nystatin Powder 1 Application(s) Topical every 12 hours  pantoprazole    Tablet 40 milliGRAM(s) Oral before breakfast  senna 2 Tablet(s) Oral at bedtime  sodium chloride 0.9% lock flush 5 milliLiter(s) IV Push two times a day  zinc oxide 40% Paste 1 Application(s) Topical two times a day  zinc sulfate 220 milliGRAM(s) Oral daily    MEDICATIONS  (PRN):  albuterol/ipratropium for Nebulization 3 milliLiter(s) Nebulizer every 6 hours PRN Shortness of Breath and/or Wheezing  aluminum hydroxide/magnesium hydroxide/simethicone Suspension 30 milliLiter(s) Oral every 4 hours PRN Dyspepsia  dextrose Oral Gel 15 Gram(s) Oral once PRN Blood Glucose LESS THAN 70 milliGRAM(s)/deciliter  sodium chloride 0.65% Nasal 1 Spray(s) Both Nostrils every 8 hours PRN Nasal Congestion                            9.9    12.49 )-----------( 208      ( 06 Oct 2023 05:25 )             32.0     10-06    137  |  101  |  22  ----------------------------<  124<H>  3.7   |  29  |  0.46<L>    Ca    8.9      06 Oct 2023 05:25  Phos  2.9     10-05  Mg     2.10     10-05    TPro  5.0<L>  /  Alb  2.2<L>  /  TBili  8.2<H>  /  DBili  x   /  AST  90<H>  /  ALT  181<H>  /  AlkPhos  496<H>  10-06    I&O's Detail    05 Oct 2023 07:01  -  06 Oct 2023 07:00  --------------------------------------------------------  IN:  Total IN: 0 mL    OUT:    Voided (mL): 150 mL  Total OUT: 150 mL    Total NET: -150 mL        Vital Signs Last 24 Hrs  T(C): 36.4 (06 Oct 2023 07:30), Max: 36.6 (05 Oct 2023 18:51)  T(F): 97.6 (06 Oct 2023 07:30), Max: 97.8 (05 Oct 2023 18:51)  HR: 80 (06 Oct 2023 07:30) (79 - 85)  BP: 103/60 (06 Oct 2023 07:30) (103/60 - 124/75)  BP(mean): --  RR: 16 (06 Oct 2023 07:30) (16 - 16)  SpO2: 95% (06 Oct 2023 07:30) (95% - 95%)    Parameters below as of 06 Oct 2023 07:30  Patient On (Oxygen Delivery Method): nasal cannula  O2 Flow (L/min): 4    CBC Full  -  ( 06 Oct 2023 05:25 )  WBC Count : 12.49 K/uL  RBC Count : 3.00 M/uL  Hemoglobin : 9.9 g/dL  Hematocrit : 32.0 %  Platelet Count - Automated : 208 K/uL  Mean Cell Volume : 106.7 fl  Mean Cell Hemoglobin : 33.0 pg  Mean Cell Hemoglobin Concentration : 30.9 gm/dL  Auto Neutrophil # : 10.99 K/uL  Auto Lymphocyte # : 0.37 K/uL  Auto Monocyte # : 0.62 K/uL  Auto Eosinophil # : 0.00 K/uL  Auto Basophil # : 0.00 K/uL  Auto Neutrophil % : 87.0 %  Auto Lymphocyte % : 3.0 %  Auto Monocyte % : 5.0 %  Auto Eosinophil % : 0.0 %  Auto Basophil % : 0.0 %        PHYSICAL EXAM:     Constitutional Normal: well nourished, well developed, non-dysmorphic, no acute distress     Psychiatric: age appropriate behavior, cooperative     Skin: no obvious skin lesions     Head: Normocephalic, Atraumatic     Lymphatic: no cervical lymphadenopathy     ENT:        External Ear:  Normal without any obvious lesions.        External Nose:  Normal, no structural deformities		        Anterior Nasal Cavity: Dry mucosa, no turbinate hypertrophy, straight septum, +dried blood in both nostrils, L>R     Oral Cavity:  Good dentition, tongue midline, no lesions or ulcerations, uvula midline     Neck: No palpable lymphadenopathy     Pulmonary: No Acute Distress.      CV: no peripheral edema/cyanosis     GI: nondistended, nontender     Peripheral vascular: no JVD or edema     Neurologic: awake and alert, no obvious facial weakness    PROCEDURE:  With a #8 nasal suction, nasal speculum and bayonet forceps, removed dried blood from both nostrils until patient was able to breathe.  No active bleeding seen.  Afrin sprayed in left nostril after cleaning.

## 2023-10-07 LAB
GLUCOSE BLDC GLUCOMTR-MCNC: 109 MG/DL — HIGH (ref 70–99)
GLUCOSE BLDC GLUCOMTR-MCNC: 128 MG/DL — HIGH (ref 70–99)
GLUCOSE BLDC GLUCOMTR-MCNC: 208 MG/DL — HIGH (ref 70–99)
GLUCOSE BLDC GLUCOMTR-MCNC: 263 MG/DL — HIGH (ref 70–99)

## 2023-10-07 PROCEDURE — 99232 SBSQ HOSP IP/OBS MODERATE 35: CPT

## 2023-10-07 PROCEDURE — 99222 1ST HOSP IP/OBS MODERATE 55: CPT

## 2023-10-07 RX ORDER — SIMETHICONE 80 MG/1
80 TABLET, CHEWABLE ORAL
Refills: 0 | Status: DISCONTINUED | OUTPATIENT
Start: 2023-10-07 | End: 2023-10-13

## 2023-10-07 RX ORDER — LOPERAMIDE HCL 2 MG
2 TABLET ORAL ONCE
Refills: 0 | Status: COMPLETED | OUTPATIENT
Start: 2023-10-07 | End: 2023-10-07

## 2023-10-07 RX ORDER — PHENYLEPHRINE-SHARK LIVER OIL-MINERAL OIL-PETROLATUM RECTAL OINTMENT
1 OINTMENT (GRAM) RECTAL
Refills: 0 | Status: DISCONTINUED | OUTPATIENT
Start: 2023-10-07 | End: 2023-10-11

## 2023-10-07 RX ADMIN — SODIUM CHLORIDE 5 MILLILITER(S): 9 INJECTION INTRAMUSCULAR; INTRAVENOUS; SUBCUTANEOUS at 05:41

## 2023-10-07 RX ADMIN — ZINC SULFATE TAB 220 MG (50 MG ZINC EQUIVALENT) 220 MILLIGRAM(S): 220 (50 ZN) TAB at 12:52

## 2023-10-07 RX ADMIN — Medication 25 MILLIGRAM(S): at 18:06

## 2023-10-07 RX ADMIN — ENOXAPARIN SODIUM 70 MILLIGRAM(S): 100 INJECTION SUBCUTANEOUS at 21:46

## 2023-10-07 RX ADMIN — Medication 1 APPLICATION(S): at 18:07

## 2023-10-07 RX ADMIN — ATOVAQUONE 1500 MILLIGRAM(S): 750 SUSPENSION ORAL at 13:08

## 2023-10-07 RX ADMIN — Medication 6: at 17:06

## 2023-10-07 RX ADMIN — SIMETHICONE 80 MILLIGRAM(S): 80 TABLET, CHEWABLE ORAL at 23:52

## 2023-10-07 RX ADMIN — Medication 15 MILLILITER(S): at 12:53

## 2023-10-07 RX ADMIN — NYSTATIN CREAM 1 APPLICATION(S): 100000 CREAM TOPICAL at 05:47

## 2023-10-07 RX ADMIN — Medication 4: at 11:47

## 2023-10-07 RX ADMIN — SODIUM CHLORIDE 5 MILLILITER(S): 9 INJECTION INTRAMUSCULAR; INTRAVENOUS; SUBCUTANEOUS at 18:12

## 2023-10-07 RX ADMIN — Medication 2 MILLIGRAM(S): at 23:52

## 2023-10-07 RX ADMIN — Medication 25 MILLIGRAM(S): at 05:46

## 2023-10-07 RX ADMIN — Medication 1 DROP(S): at 05:39

## 2023-10-07 RX ADMIN — PANTOPRAZOLE SODIUM 40 MILLIGRAM(S): 20 TABLET, DELAYED RELEASE ORAL at 05:40

## 2023-10-07 RX ADMIN — Medication 1 MILLIGRAM(S): at 12:52

## 2023-10-07 RX ADMIN — Medication 500 MILLIGRAM(S): at 12:52

## 2023-10-07 RX ADMIN — ENOXAPARIN SODIUM 70 MILLIGRAM(S): 100 INJECTION SUBCUTANEOUS at 09:08

## 2023-10-07 RX ADMIN — Medication 64 MILLIGRAM(S): at 05:40

## 2023-10-07 RX ADMIN — PHENYLEPHRINE-SHARK LIVER OIL-MINERAL OIL-PETROLATUM RECTAL OINTMENT 1 APPLICATION(S): at 23:25

## 2023-10-07 RX ADMIN — NYSTATIN CREAM 1 APPLICATION(S): 100000 CREAM TOPICAL at 18:07

## 2023-10-07 RX ADMIN — Medication 1 APPLICATION(S): at 05:39

## 2023-10-07 RX ADMIN — Medication 6 MILLIGRAM(S): at 21:45

## 2023-10-07 NOTE — CHART NOTE - NSCHARTNOTEFT_GEN_A_CORE
Nutrition Services    Patient noted 'lactose intolerant " however nursing reports patient unset due to all lactose containing foods are omitted from meal trays , Patient reports to nursing she is able to tolerate certain foods containing lactose & is requesting to receive lactose containing foods , Nutrition office made aware & lactose restriction removed from patient cardex in nutrition office, Nursing aware , will provided patient with requested foods ,

## 2023-10-07 NOTE — CONSULT NOTE ADULT - SUBJECTIVE AND OBJECTIVE BOX
ZO FOLEY  [id number]      HPI:    Zo Foley is a 62 year old woman with past medical history of Atrial fibrillation (not on anticoagulation), with recently diagnosed Interstitial lung disease at [facility], also hospital course consistent with pneumonia s/p antibiotic course with worsening hypoxemic respiratory failure s/p intubation, then transferred to [facility] for septic and cardiogenic shock s/p VV-ECMO and found to have diffuse alveolar hemorrhage s/p plasmapheresis and steroids, also hospital course complicated by thrombocytopenia requiring platelet transfusions and episodes of atrial fibrillation with rapid ventricular rates, and non-occlusive right IJ thrombus, now extubated and currently admitted to [facility].      ALLERGIES:  No Known Allergies  lactose (Unknown)      PAST MEDICAL & SURGICAL HISTORY:  Atrial fibrillation   Sciatica      CURRENT MEDICATIONS:  albuterol/ipratropium for Nebulization 3 milliLiter(s) Nebulizer every 6 hours PRN  aluminum hydroxide/magnesium hydroxide/simethicone Suspension 30 milliLiter(s) Oral every 4 hours PRN  ammonium lactate 12% Lotion 1 Application(s) Topical every 12 hours  artificial  tears Solution 1 Drop(s) Both EYES every 12 hours  ascorbic acid 500 milliGRAM(s) Oral daily  atovaquone  Suspension 1500 milliGRAM(s) Oral daily  dextrose 5%. 1000 milliLiter(s) IV Continuous <Continuous>  dextrose 5%. 1000 milliLiter(s) IV Continuous <Continuous>  dextrose 50% Injectable 12.5 Gram(s) IV Push once  dextrose 50% Injectable 25 Gram(s) IV Push once  dextrose 50% Injectable 25 Gram(s) IV Push once  dextrose Oral Gel 15 Gram(s) Oral once PRN  enoxaparin Injectable 70 milliGRAM(s) SubCutaneous <User Schedule>  folic acid 1 milliGRAM(s) Oral daily  glucagon  Injectable 1 milliGRAM(s) IntraMuscular once  influenza   Vaccine 0.5 milliLiter(s) IntraMuscular once  insulin lispro (ADMELOG) corrective regimen sliding scale   SubCutaneous Before meals and at bedtime  melatonin 6 milliGRAM(s) Oral at bedtime  methylPREDNISolone 64 milliGRAM(s) Oral daily  metoprolol tartrate 25 milliGRAM(s) Oral two times a day  multivitamin/minerals/iron Oral Solution (CENTRUM) 15 milliLiter(s) Oral daily  nystatin Powder 1 Application(s) Topical every 12 hours  pantoprazole    Tablet 40 milliGRAM(s) Oral before breakfast  senna 2 Tablet(s) Oral at bedtime  sodium chloride 0.65% Nasal 1 Spray(s) Both Nostrils every 8 hours PRN  sodium chloride 0.9% lock flush 5 milliLiter(s) IV Push two times a day  zinc oxide 40% Paste 1 Application(s) Topical two times a day  zinc sulfate 220 milliGRAM(s) Oral daily    ROS:  All 10 systems reviewed and positives noted in HPI    OBJECTIVE:    VITAL SIGNS:  Vital Signs Last 24 Hrs  T(C): 36.5 (07 Oct 2023 07:40), Max: 36.6 (06 Oct 2023 21:17)  T(F): 97.7 (07 Oct 2023 07:40), Max: 97.8 (06 Oct 2023 21:17)  HR: 71 (07 Oct 2023 07:40) (69 - 80)  BP: 105/68 (07 Oct 2023 07:40) (102/65 - 114/73)  BP(mean): --  RR: 18 (07 Oct 2023 07:40) (16 - 18)  SpO2: 93% (07 Oct 2023 07:40) (93% - 95%)    Parameters below as of 07 Oct 2023 07:40  Patient On (Oxygen Delivery Method): nasal cannula  O2 Flow (L/min): 3      PHYSICAL EXAM:  General: well appearing, no distress  HEENT: sclera anicteric  Neck: supple, no carotid bruits b/l  CVS: JVP ~ 7 cm H20, RRR, s1, s2, no murmurs/rubs/gallops  Chest: unlabored respirations, clear to auscultation b/l  Abdomen: non-distended  Extremities: no lower extremity edema b/l  Neuro: awake, alert & oriented x 3  Psych: normal affect      LABS:                        9.9    12.49 )-----------( 208      ( 06 Oct 2023 05:25 )             32.0     10-06    137  |  101  |  22  ----------------------------<  124<H>  3.7   |  29  |  0.46<L>    Ca    8.9      06 Oct 2023 05:25    TPro  5.0<L>  /  Alb  2.2<L>  /  TBili  8.2<H>  /  DBili  x   /  AST  90<H>  /  ALT  181<H>  /  AlkPhos  496<H>  10-06        TTE (10/4/23):  LVEF 64%  Normal RV size and systolic function  Mild AR  No pericardial effusion  ZO FOLEY  [id number]      HPI:    Zo Foley is a 62 year old woman with past medical history of Atrial fibrillation (not on anticoagulation), with recently diagnosed Interstitial lung disease at [facility], also hospital course consistent with pneumonia s/p antibiotic course with worsening hypoxemic respiratory failure s/p intubation, then transferred to [facility] for septic and cardiogenic shock s/p VV-ECMO and found to have diffuse alveolar hemorrhage s/p plasmapheresis and steroids, also hospital course complicated by thrombocytopenia requiring platelet transfusions and episodes of atrial fibrillation with rapid ventricular rates, and non-occlusive right IJ thrombus, now extubated and currently admitted to [facility].      ALLERGIES:  No Known Allergies  lactose (Unknown)      PAST MEDICAL & SURGICAL HISTORY:  Atrial fibrillation   Sciatica      CURRENT MEDICATIONS:  albuterol/ipratropium for Nebulization 3 milliLiter(s) Nebulizer every 6 hours PRN  aluminum hydroxide/magnesium hydroxide/simethicone Suspension 30 milliLiter(s) Oral every 4 hours PRN  ammonium lactate 12% Lotion 1 Application(s) Topical every 12 hours  artificial  tears Solution 1 Drop(s) Both EYES every 12 hours  ascorbic acid 500 milliGRAM(s) Oral daily  atovaquone  Suspension 1500 milliGRAM(s) Oral daily  dextrose 5%. 1000 milliLiter(s) IV Continuous <Continuous>  dextrose 5%. 1000 milliLiter(s) IV Continuous <Continuous>  dextrose 50% Injectable 12.5 Gram(s) IV Push once  dextrose 50% Injectable 25 Gram(s) IV Push once  dextrose 50% Injectable 25 Gram(s) IV Push once  dextrose Oral Gel 15 Gram(s) Oral once PRN  enoxaparin Injectable 70 milliGRAM(s) SubCutaneous <User Schedule>  folic acid 1 milliGRAM(s) Oral daily  glucagon  Injectable 1 milliGRAM(s) IntraMuscular once  influenza   Vaccine 0.5 milliLiter(s) IntraMuscular once  insulin lispro (ADMELOG) corrective regimen sliding scale   SubCutaneous Before meals and at bedtime  melatonin 6 milliGRAM(s) Oral at bedtime  methylPREDNISolone 64 milliGRAM(s) Oral daily  metoprolol tartrate 25 milliGRAM(s) Oral two times a day  multivitamin/minerals/iron Oral Solution (CENTRUM) 15 milliLiter(s) Oral daily  nystatin Powder 1 Application(s) Topical every 12 hours  pantoprazole    Tablet 40 milliGRAM(s) Oral before breakfast  senna 2 Tablet(s) Oral at bedtime  sodium chloride 0.65% Nasal 1 Spray(s) Both Nostrils every 8 hours PRN  sodium chloride 0.9% lock flush 5 milliLiter(s) IV Push two times a day  zinc oxide 40% Paste 1 Application(s) Topical two times a day  zinc sulfate 220 milliGRAM(s) Oral daily    ROS:  All 10 systems reviewed and positives noted in HPI    OBJECTIVE:    VITAL SIGNS:  Vital Signs Last 24 Hrs  T(C): 36.5 (07 Oct 2023 07:40), Max: 36.6 (06 Oct 2023 21:17)  T(F): 97.7 (07 Oct 2023 07:40), Max: 97.8 (06 Oct 2023 21:17)  HR: 71 (07 Oct 2023 07:40) (69 - 80)  BP: 105/68 (07 Oct 2023 07:40) (102/65 - 114/73)  BP(mean): --  RR: 18 (07 Oct 2023 07:40) (16 - 18)  SpO2: 93% (07 Oct 2023 07:40) (93% - 95%)    Parameters below as of 07 Oct 2023 07:40  Patient On (Oxygen Delivery Method): nasal cannula  O2 Flow (L/min): 3      PHYSICAL EXAM:  General: well appearing, no distress  HEENT: sclera anicteric  Neck: supple, no carotid bruits b/l  CVS: JVP ~ 7 cm H20, RRR, s1, s2, no murmurs/rubs/gallops  Chest: unlabored respirations, clear to auscultation b/l  Abdomen: non-distended  Extremities: no lower extremity edema b/l  Neuro: awake, alert & oriented x 3  Psych: normal affect      LABS:                        9.9    12.49 )-----------( 208      ( 06 Oct 2023 05:25 )             32.0     10-06    137  |  101  |  22  ----------------------------<  124<H>  3.7   |  29  |  0.46<L>    Ca    8.9      06 Oct 2023 05:25    TPro  5.0<L>  /  Alb  2.2<L>  /  TBili  8.2<H>  /  DBili  x   /  AST  90<H>  /  ALT  181<H>  /  AlkPhos  496<H>  10-06      ECG (9/12/23): sinus rhythm, low voltage    TTE (10/4/23):  LVEF 64%  Normal RV size and systolic function  Mild AR  No pericardial effusion  ZO FOLEY  [id number]      HPI:    Zo Foley is a 62 year old woman with past medical history of Atrial fibrillation (not on anticoagulation), with recently diagnosed Interstitial lung disease at [facility], also hospital course consistent with pneumonia s/p antibiotic course with worsening hypoxemic respiratory failure s/p intubation, then transferred to [facility] for septic and cardiogenic shock s/p VV-ECMO and found to have diffuse alveolar hemorrhage s/p plasmapheresis and steroids, also hospital course complicated by thrombocytopenia requiring platelet transfusions and episodes of atrial fibrillation with rapid ventricular rates, and non-occlusive right IJ thrombus, now extubated and currently admitted to [facility].      ALLERGIES:  No Known Allergies  lactose (Unknown)      PAST MEDICAL & SURGICAL HISTORY:  Atrial fibrillation   Sciatica      CURRENT MEDICATIONS:  albuterol/ipratropium for Nebulization 3 milliLiter(s) Nebulizer every 6 hours PRN  aluminum hydroxide/magnesium hydroxide/simethicone Suspension 30 milliLiter(s) Oral every 4 hours PRN  ammonium lactate 12% Lotion 1 Application(s) Topical every 12 hours  artificial  tears Solution 1 Drop(s) Both EYES every 12 hours  ascorbic acid 500 milliGRAM(s) Oral daily  atovaquone  Suspension 1500 milliGRAM(s) Oral daily  dextrose 5%. 1000 milliLiter(s) IV Continuous <Continuous>  dextrose 5%. 1000 milliLiter(s) IV Continuous <Continuous>  dextrose 50% Injectable 12.5 Gram(s) IV Push once  dextrose 50% Injectable 25 Gram(s) IV Push once  dextrose 50% Injectable 25 Gram(s) IV Push once  dextrose Oral Gel 15 Gram(s) Oral once PRN  enoxaparin Injectable 70 milliGRAM(s) SubCutaneous <User Schedule>  folic acid 1 milliGRAM(s) Oral daily  glucagon  Injectable 1 milliGRAM(s) IntraMuscular once  influenza   Vaccine 0.5 milliLiter(s) IntraMuscular once  insulin lispro (ADMELOG) corrective regimen sliding scale   SubCutaneous Before meals and at bedtime  melatonin 6 milliGRAM(s) Oral at bedtime  methylPREDNISolone 64 milliGRAM(s) Oral daily  metoprolol tartrate 25 milliGRAM(s) Oral two times a day  multivitamin/minerals/iron Oral Solution (CENTRUM) 15 milliLiter(s) Oral daily  nystatin Powder 1 Application(s) Topical every 12 hours  pantoprazole    Tablet 40 milliGRAM(s) Oral before breakfast  senna 2 Tablet(s) Oral at bedtime  sodium chloride 0.65% Nasal 1 Spray(s) Both Nostrils every 8 hours PRN  sodium chloride 0.9% lock flush 5 milliLiter(s) IV Push two times a day  zinc oxide 40% Paste 1 Application(s) Topical two times a day  zinc sulfate 220 milliGRAM(s) Oral daily    ROS:  All 10 systems reviewed and positives noted in HPI    OBJECTIVE:    VITAL SIGNS:  Vital Signs Last 24 Hrs  T(C): 36.5 (07 Oct 2023 07:40), Max: 36.6 (06 Oct 2023 21:17)  T(F): 97.7 (07 Oct 2023 07:40), Max: 97.8 (06 Oct 2023 21:17)  HR: 71 (07 Oct 2023 07:40) (69 - 80)  BP: 105/68 (07 Oct 2023 07:40) (102/65 - 114/73)  BP(mean): --  RR: 18 (07 Oct 2023 07:40) (16 - 18)  SpO2: 93% (07 Oct 2023 07:40) (93% - 95%)    Parameters below as of 07 Oct 2023 07:40  Patient On (Oxygen Delivery Method): nasal cannula  O2 Flow (L/min): 3      PHYSICAL EXAM:  General: morbidly obese, no acute distress  HEENT: sclera anicteric  Neck: supple, no carotid bruits b/l  CVS: JVP ~ 7 cm H20, RRR, s1, s2, no murmurs  Chest: unlabored respirations, mostly clear to ausculation anteriorly   Abdomen: obese  Extremities: wrapped lower extremities   Neuro: awake, alert & oriented   Psych: normal affect      LABS:                        9.9    12.49 )-----------( 208      ( 06 Oct 2023 05:25 )             32.0     10-06    137  |  101  |  22  ----------------------------<  124<H>  3.7   |  29  |  0.46<L>    Ca    8.9      06 Oct 2023 05:25    TPro  5.0<L>  /  Alb  2.2<L>  /  TBili  8.2<H>  /  DBili  x   /  AST  90<H>  /  ALT  181<H>  /  AlkPhos  496<H>  10-06      ECG (9/12/23): sinus rhythm, low voltage    TTE (10/4/23):  LVEF 64%  Normal RV size and systolic function  Mild AR  No pericardial effusion

## 2023-10-07 NOTE — CONSULT NOTE ADULT - ASSESSMENT
Assessment:  Zo Hager is a 62 year old woman with past medical history of Atrial fibrillation (not on anticoagulation), with recently diagnosed Interstitial lung disease at [facility], also hospital course consistent with pneumonia s/p antibiotic course with worsening hypoxemic respiratory failure s/p intubation, then transferred to [facility] for septic and cardiogenic shock s/p VV-ECMO and found to have diffuse alveolar hemorrhage s/p plasmapheresis and steroids, also hospital course complicated by thrombocytopenia requiring platelet transfusions and episodes of atrial fibrillation with rapid ventricular rates, and non-occlusive right IJ thrombus, now extubated and currently admitted to [facility].    Cardiology consulted regarding role of anticoagulation for history of atrial fibrillation. Chart review indicates that Cardiology evaluated patient at [facility] in September and deemed patient to have [id number] score of 1 and that she will turn 63 in few months with  higher score and would require anticoagulation, however patient was deferring, and there was consideration of Watchman device.  Assessment:  Zo Hager is a 62 year old woman with past medical history of Atrial fibrillation (not on anticoagulation), with recently diagnosed Interstitial lung disease at [facility], also hospital course consistent with pneumonia s/p antibiotic course with worsening hypoxemic respiratory failure s/p intubation, then transferred to [facility] for septic and cardiogenic shock s/p VV-ECMO and found to have diffuse alveolar hemorrhage s/p plasmapheresis and steroids, also hospital course complicated by thrombocytopenia requiring platelet transfusions and episodes of atrial fibrillation with rapid ventricular rates, and non-occlusive right IJ thrombus, now extubated and currently admitted to [facility].    Cardiology consulted regarding role of anticoagulation for history of atrial fibrillation. Most recent ECG consistent with sinus rhythm and low voltage. Echo report consistent with normal LV and RV systolic function. The patient reports feeling well, denies angina, dyspnea or palpitations at this time.     Recommendations:  [] Paroxysmal atrial fibrillation: Currently in sinus rhythm. Chart review indicates that Cardiology evaluated patient at [facility] in September and deemed patient to have [id number] score of 1 and that she will turn 63 in few months with higher score and would require anticoagulation, however patient was deferring, and there was consideration of Watchman device. Due to RIJ thrombus, appears [id number] score would be at least 2 (although thrombus likely due to ECMO cannula), patient amenable to trial of anticoagulation while inpatient, may continue Lovenox, will need to monitor Hgb and platelets daily, also monitor for recurrent epistaxis/ENT evaluation, eventual transition to DOAC prior to discharge. Recommend Vascular follow up regarding their recommendations for RIJ thrombus anticoagulation treatment duration. Patient will follow up with her cardiologist when discharged.     Discussed with patient and family at bedside. Please re-consult if needed.    Marina Tabor MD  Cardiology

## 2023-10-07 NOTE — PROGRESS NOTE ADULT - SUBJECTIVE AND OBJECTIVE BOX
No overnight events.  participating with therapy     REVIEW OF SYSTEMS  Constitutional - No fever,  No fatigue  Neurological - No headaches, No loss of strength  Musculoskeletal - No joint pain, No joint swelling, No muscle pain    VITALS  T(C): 36.5 (10-07-23 @ 07:40), Max: 36.6 (10-06-23 @ 21:17)  HR: 71 (10-07-23 @ 07:40) (69 - 80)  BP: 105/68 (10-07-23 @ 07:40) (102/65 - 114/73)  RR: 18 (10-07-23 @ 07:40) (16 - 18)  SpO2: 93% (10-07-23 @ 07:40) (93% - 95%)  Wt(kg): --       MEDICATIONS   albuterol/ipratropium for Nebulization 3 milliLiter(s) every 6 hours PRN  aluminum hydroxide/magnesium hydroxide/simethicone Suspension 30 milliLiter(s) every 4 hours PRN  ammonium lactate 12% Lotion 1 Application(s) every 12 hours  artificial  tears Solution 1 Drop(s) every 12 hours  ascorbic acid 500 milliGRAM(s) daily  atovaquone  Suspension 1500 milliGRAM(s) daily  dextrose 5%. 1000 milliLiter(s) <Continuous>  dextrose 5%. 1000 milliLiter(s) <Continuous>  dextrose 50% Injectable 12.5 Gram(s) once  dextrose 50% Injectable 25 Gram(s) once  dextrose 50% Injectable 25 Gram(s) once  dextrose Oral Gel 15 Gram(s) once PRN  enoxaparin Injectable 70 milliGRAM(s) <User Schedule>  folic acid 1 milliGRAM(s) daily  glucagon  Injectable 1 milliGRAM(s) once  influenza   Vaccine 0.5 milliLiter(s) once  insulin lispro (ADMELOG) corrective regimen sliding scale   Before meals and at bedtime  melatonin 6 milliGRAM(s) at bedtime  methylPREDNISolone 64 milliGRAM(s) daily  metoprolol tartrate 25 milliGRAM(s) two times a day  multivitamin/minerals/iron Oral Solution (CENTRUM) 15 milliLiter(s) daily  nystatin Powder 1 Application(s) every 12 hours  pantoprazole    Tablet 40 milliGRAM(s) before breakfast  senna 2 Tablet(s) at bedtime  sodium chloride 0.65% Nasal 1 Spray(s) every 8 hours PRN  sodium chloride 0.9% lock flush 5 milliLiter(s) two times a day  zinc oxide 40% Paste 1 Application(s) two times a day  zinc sulfate 220 milliGRAM(s) daily      RECENT LABS/IMAGING                        9.9    12.49 )-----------( 208      ( 06 Oct 2023 05:25 )             32.0     10-06    137  |  101  |  22  ----------------------------<  124<H>  3.7   |  29  |  0.46<L>    Ca    8.9      06 Oct 2023 05:25    TPro  5.0<L>  /  Alb  2.2<L>  /  TBili  8.2<H>  /  DBili  x   /  AST  90<H>  /  ALT  181<H>  /  AlkPhos  496<H>  10-06      Urinalysis Basic - ( 06 Oct 2023 05:25 )    Color: x / Appearance: x / SG: x / pH: x  Gluc: 124 mg/dL / Ketone: x  / Bili: x / Urobili: x   Blood: x / Protein: x / Nitrite: x   Leuk Esterase: x / RBC: x / WBC x   Sq Epi: x / Non Sq Epi: x / Bacteria: x              POCT Blood Glucose.: 109 mg/dL (10-07-23 @ 07:35)  POCT Blood Glucose.: 171 mg/dL (10-06-23 @ 21:19)  POCT Blood Glucose.: 247 mg/dL (10-06-23 @ 17:10)  POCT Blood Glucose.: 212 mg/dL (10-06-23 @ 12:06)    ---------  PHYSICAL EXAM  Constitutional - NAD, Comfortable, in bed  Pulm - Breathing comfortably, O2 by NC   Abd - Soft, NTND  Extremities - b/l LE edema   Neurologic Exam -                    Cognitive - Awake, Alert, oriented x 3     Communication - Fluent     Motor - b/l LE weakness      Sensory - Intact to LT  Psychiatric - Mood WNL, Affect WNL    ASSESSMENT/PLAN  62y Female h/o afib with functional deficits due to ILD, with respiratory failure   s/p plasmapheresis, rituximab, steroids, on steroid taper  dysphagia   wound care following for groin wounds   ENT consulted for epistaxis, hoarse voice, hold off on laryngoscopy due to epistaxis   afib, RIJ DVT on lovenox   transaminitis, continue to monitor   Continue current medical management  Pain - Tylenol PRN  Continue 3hrs a day of comprehensive rehab program.

## 2023-10-07 NOTE — CHART NOTE - NSCHARTNOTEFT_GEN_A_CORE
Patient seen at bedside for persistent bright red blood per rectum. Known history of hemorrhoids. Endorses perianal pain, mild generalized abdominal pain, and gas. Patient ate significant amount of food today and has been having frequent bowel movements showing mix of loose brown stool and red blood. No fever, chills, nausea, vomiting, heartburn.    Vital Signs Last 24 Hrs  T(C): 36.3 (07 Oct 2023 21:50), Max: 36.5 (07 Oct 2023 07:40)  T(F): 97.4 (07 Oct 2023 21:50), Max: 97.7 (07 Oct 2023 07:40)  HR: 70 (07 Oct 2023 21:50) (69 - 81)  BP: 106/67 (07 Oct 2023 21:50) (105/68 - 115/75)  BP(mean): --  RR: 16 (07 Oct 2023 21:50) (16 - 18)  SpO2: 95% (07 Oct 2023 21:50) (93% - 96%)    Parameters below as of 07 Oct 2023 21:50  Patient On (Oxygen Delivery Method): nasal cannula  O2 Flow (L/min): 3    General: reclining in no acute distress  Resp: normal effort on nasal cannula  Abd: nondistended, soft, mildly tender throughout  Anus: external hemorrhoids noted  Skin: warm and dry  Neuro: alert; well oriented  Psych: appropriate mood and affect    Vital signs reassuring. Suspect related to known history of hemorrhoids vs other lower GI bleed. Brown stool and symptoms non-concerning for upper GI bleed. Urogenital area free of blood per nursing. Check CBC and PT/PTT/INR. Will give 1 dose Imodium. Add simethicone PRN. Ice PRN to anus. Continue hemorrhoid ointment. Hold Lovenox and laxatives.

## 2023-10-08 LAB
APTT BLD: 27.6 SEC — SIGNIFICANT CHANGE UP (ref 24.5–35.6)
GLUCOSE BLDC GLUCOMTR-MCNC: 106 MG/DL — HIGH (ref 70–99)
GLUCOSE BLDC GLUCOMTR-MCNC: 150 MG/DL — HIGH (ref 70–99)
GLUCOSE BLDC GLUCOMTR-MCNC: 187 MG/DL — HIGH (ref 70–99)
GLUCOSE BLDC GLUCOMTR-MCNC: 203 MG/DL — HIGH (ref 70–99)
HCT VFR BLD CALC: 30.8 % — LOW (ref 34.5–45)
HGB BLD-MCNC: 9.6 G/DL — LOW (ref 11.5–15.5)
INR BLD: 0.95 RATIO — SIGNIFICANT CHANGE UP (ref 0.85–1.18)
MCHC RBC-ENTMCNC: 31.2 GM/DL — LOW (ref 32–36)
MCHC RBC-ENTMCNC: 33.3 PG — SIGNIFICANT CHANGE UP (ref 27–34)
MCV RBC AUTO: 106.9 FL — HIGH (ref 80–100)
NRBC # BLD: 0 /100 WBCS — SIGNIFICANT CHANGE UP (ref 0–0)
PLATELET # BLD AUTO: 209 K/UL — SIGNIFICANT CHANGE UP (ref 150–400)
PROTHROM AB SERPL-ACNC: 10.9 SEC — SIGNIFICANT CHANGE UP (ref 9.5–13)
RBC # BLD: 2.88 M/UL — LOW (ref 3.8–5.2)
RBC # FLD: 21.9 % — HIGH (ref 10.3–14.5)
WBC # BLD: 13.92 K/UL — HIGH (ref 3.8–10.5)
WBC # FLD AUTO: 13.92 K/UL — HIGH (ref 3.8–10.5)

## 2023-10-08 PROCEDURE — 99233 SBSQ HOSP IP/OBS HIGH 50: CPT

## 2023-10-08 RX ORDER — LOPERAMIDE HCL 2 MG
2 TABLET ORAL ONCE
Refills: 0 | Status: COMPLETED | OUTPATIENT
Start: 2023-10-08 | End: 2023-10-08

## 2023-10-08 RX ADMIN — Medication 1 DROP(S): at 06:11

## 2023-10-08 RX ADMIN — ZINC SULFATE TAB 220 MG (50 MG ZINC EQUIVALENT) 220 MILLIGRAM(S): 220 (50 ZN) TAB at 12:39

## 2023-10-08 RX ADMIN — NYSTATIN CREAM 1 APPLICATION(S): 100000 CREAM TOPICAL at 18:38

## 2023-10-08 RX ADMIN — SODIUM CHLORIDE 5 MILLILITER(S): 9 INJECTION INTRAMUSCULAR; INTRAVENOUS; SUBCUTANEOUS at 06:18

## 2023-10-08 RX ADMIN — Medication 2 MILLIGRAM(S): at 03:21

## 2023-10-08 RX ADMIN — PHENYLEPHRINE-SHARK LIVER OIL-MINERAL OIL-PETROLATUM RECTAL OINTMENT 1 APPLICATION(S): at 12:40

## 2023-10-08 RX ADMIN — Medication 64 MILLIGRAM(S): at 06:12

## 2023-10-08 RX ADMIN — PHENYLEPHRINE-SHARK LIVER OIL-MINERAL OIL-PETROLATUM RECTAL OINTMENT 1 APPLICATION(S): at 06:12

## 2023-10-08 RX ADMIN — PHENYLEPHRINE-SHARK LIVER OIL-MINERAL OIL-PETROLATUM RECTAL OINTMENT 1 APPLICATION(S): at 23:33

## 2023-10-08 RX ADMIN — SODIUM CHLORIDE 5 MILLILITER(S): 9 INJECTION INTRAMUSCULAR; INTRAVENOUS; SUBCUTANEOUS at 18:19

## 2023-10-08 RX ADMIN — PHENYLEPHRINE-SHARK LIVER OIL-MINERAL OIL-PETROLATUM RECTAL OINTMENT 1 APPLICATION(S): at 18:20

## 2023-10-08 RX ADMIN — Medication 2: at 12:06

## 2023-10-08 RX ADMIN — Medication 15 MILLILITER(S): at 12:39

## 2023-10-08 RX ADMIN — ATOVAQUONE 1500 MILLIGRAM(S): 750 SUSPENSION ORAL at 12:40

## 2023-10-08 RX ADMIN — Medication 1 APPLICATION(S): at 06:11

## 2023-10-08 RX ADMIN — Medication 1 APPLICATION(S): at 18:21

## 2023-10-08 RX ADMIN — Medication 500 MILLIGRAM(S): at 12:39

## 2023-10-08 RX ADMIN — Medication 4: at 17:12

## 2023-10-08 RX ADMIN — ZINC OXIDE 1 APPLICATION(S): 200 OINTMENT TOPICAL at 06:13

## 2023-10-08 RX ADMIN — Medication 25 MILLIGRAM(S): at 18:21

## 2023-10-08 RX ADMIN — PANTOPRAZOLE SODIUM 40 MILLIGRAM(S): 20 TABLET, DELAYED RELEASE ORAL at 06:13

## 2023-10-08 RX ADMIN — NYSTATIN CREAM 1 APPLICATION(S): 100000 CREAM TOPICAL at 06:18

## 2023-10-08 RX ADMIN — Medication 1 MILLIGRAM(S): at 12:39

## 2023-10-08 RX ADMIN — Medication 6 MILLIGRAM(S): at 22:00

## 2023-10-08 NOTE — PROGRESS NOTE ADULT - SUBJECTIVE AND OBJECTIVE BOX
+rectal bleeding overnight  patient complains of pain "at bottom"     REVIEW OF SYSTEMS  Constitutional - No fever,  No fatigue  Neurological - No headaches, No loss of strength  Musculoskeletal - No joint pain, No joint swelling, No muscle pain    VITALS  T(C): 36.5 (10-08-23 @ 08:02), Max: 36.6 (10-08-23 @ 06:16)  HR: 69 (10-08-23 @ 08:02) (67 - 81)  BP: 100/65 (10-08-23 @ 08:02) (94/58 - 115/75)  RR: 16 (10-08-23 @ 08:02) (16 - 18)  SpO2: 97% (10-08-23 @ 08:02) (95% - 98%)  Wt(kg): --       MEDICATIONS   albuterol/ipratropium for Nebulization 3 milliLiter(s) every 6 hours PRN  aluminum hydroxide/magnesium hydroxide/simethicone Suspension 30 milliLiter(s) every 4 hours PRN  ammonium lactate 12% Lotion 1 Application(s) every 12 hours  artificial  tears Solution 1 Drop(s) every 12 hours  ascorbic acid 500 milliGRAM(s) daily  atovaquone  Suspension 1500 milliGRAM(s) daily  dextrose 5%. 1000 milliLiter(s) <Continuous>  dextrose 5%. 1000 milliLiter(s) <Continuous>  dextrose 50% Injectable 12.5 Gram(s) once  dextrose 50% Injectable 25 Gram(s) once  dextrose 50% Injectable 25 Gram(s) once  dextrose Oral Gel 15 Gram(s) once PRN  folic acid 1 milliGRAM(s) daily  glucagon  Injectable 1 milliGRAM(s) once  hemorrhoidal Ointment 1 Application(s) four times a day  influenza   Vaccine 0.5 milliLiter(s) once  insulin lispro (ADMELOG) corrective regimen sliding scale   Before meals and at bedtime  melatonin 6 milliGRAM(s) at bedtime  methylPREDNISolone 64 milliGRAM(s) daily  metoprolol tartrate 25 milliGRAM(s) two times a day  multivitamin/minerals/iron Oral Solution (CENTRUM) 15 milliLiter(s) daily  nystatin Powder 1 Application(s) every 12 hours  pantoprazole    Tablet 40 milliGRAM(s) before breakfast  simethicone 80 milliGRAM(s) four times a day PRN  sodium chloride 0.65% Nasal 1 Spray(s) every 8 hours PRN  sodium chloride 0.9% lock flush 5 milliLiter(s) two times a day  zinc oxide 40% Paste 1 Application(s) two times a day  zinc sulfate 220 milliGRAM(s) daily      RECENT LABS/IMAGING                      POCT Blood Glucose.: 106 mg/dL (10-08-23 @ 07:54)  POCT Blood Glucose.: 128 mg/dL (10-07-23 @ 21:48)  POCT Blood Glucose.: 263 mg/dL (10-07-23 @ 17:03)  POCT Blood Glucose.: 208 mg/dL (10-07-23 @ 11:45)    ---------  ---  PHYSICAL EXAM  Constitutional - NAD, Comfortable, in bed  Pulm - Breathing comfortably, O2 by NC   Abd - Soft, NTND  Extremities - b/l LE edema   Neurologic Exam -                    Cognitive - Awake, Alert, oriented x 3     Communication - Fluent     Motor - b/l LE weakness      Sensory - Intact to LT  Psychiatric - Mood WNL, Affect WNL    ASSESSMENT/PLAN  62y Female h/o afib with functional deficits due to ILD, with respiratory failure   s/p plasmapheresis, rituximab, steroids, on steroid taper  dysphagia   wound care following for groin wounds   ENT consulted for epistaxis, hoarse voice, hold off on laryngoscopy due to epistaxis   afib, RIJ DVT on lovenox, cardiology consult appreciated, monitor Hgb, platelets daily   transaminitis, continue to monitor   rectal bleeding, h/o hemorrhoids, immodium given, hemorrhoid ointment   Continue current medical management  Pain - Tylenol PRN  Continue 3hrs a day of comprehensive rehab program.   AM labs

## 2023-10-09 DIAGNOSIS — I48.91 UNSPECIFIED ATRIAL FIBRILLATION: ICD-10-CM

## 2023-10-09 LAB
GLUCOSE BLDC GLUCOMTR-MCNC: 109 MG/DL — HIGH (ref 70–99)
GLUCOSE BLDC GLUCOMTR-MCNC: 154 MG/DL — HIGH (ref 70–99)
GLUCOSE BLDC GLUCOMTR-MCNC: 189 MG/DL — HIGH (ref 70–99)
GLUCOSE BLDC GLUCOMTR-MCNC: 194 MG/DL — HIGH (ref 70–99)

## 2023-10-09 PROCEDURE — 99232 SBSQ HOSP IP/OBS MODERATE 35: CPT

## 2023-10-09 RX ORDER — ENOXAPARIN SODIUM 100 MG/ML
70 INJECTION SUBCUTANEOUS EVERY 12 HOURS
Refills: 0 | Status: DISCONTINUED | OUTPATIENT
Start: 2023-10-09 | End: 2023-10-11

## 2023-10-09 RX ADMIN — SIMETHICONE 80 MILLIGRAM(S): 80 TABLET, CHEWABLE ORAL at 06:44

## 2023-10-09 RX ADMIN — Medication 1 DROP(S): at 06:44

## 2023-10-09 RX ADMIN — Medication 1 APPLICATION(S): at 17:01

## 2023-10-09 RX ADMIN — SODIUM CHLORIDE 5 MILLILITER(S): 9 INJECTION INTRAMUSCULAR; INTRAVENOUS; SUBCUTANEOUS at 17:07

## 2023-10-09 RX ADMIN — Medication 25 MILLIGRAM(S): at 17:01

## 2023-10-09 RX ADMIN — Medication 2: at 22:00

## 2023-10-09 RX ADMIN — ZINC OXIDE 1 APPLICATION(S): 200 OINTMENT TOPICAL at 17:08

## 2023-10-09 RX ADMIN — PANTOPRAZOLE SODIUM 40 MILLIGRAM(S): 20 TABLET, DELAYED RELEASE ORAL at 06:44

## 2023-10-09 RX ADMIN — PHENYLEPHRINE-SHARK LIVER OIL-MINERAL OIL-PETROLATUM RECTAL OINTMENT 1 APPLICATION(S): at 17:01

## 2023-10-09 RX ADMIN — Medication 15 MILLILITER(S): at 11:08

## 2023-10-09 RX ADMIN — PHENYLEPHRINE-SHARK LIVER OIL-MINERAL OIL-PETROLATUM RECTAL OINTMENT 1 APPLICATION(S): at 11:09

## 2023-10-09 RX ADMIN — ZINC SULFATE TAB 220 MG (50 MG ZINC EQUIVALENT) 220 MILLIGRAM(S): 220 (50 ZN) TAB at 11:09

## 2023-10-09 RX ADMIN — Medication 500 MILLIGRAM(S): at 11:09

## 2023-10-09 RX ADMIN — Medication 6 MILLIGRAM(S): at 21:54

## 2023-10-09 RX ADMIN — NYSTATIN CREAM 1 APPLICATION(S): 100000 CREAM TOPICAL at 07:03

## 2023-10-09 RX ADMIN — PHENYLEPHRINE-SHARK LIVER OIL-MINERAL OIL-PETROLATUM RECTAL OINTMENT 1 APPLICATION(S): at 06:45

## 2023-10-09 RX ADMIN — Medication 1 MILLIGRAM(S): at 11:08

## 2023-10-09 RX ADMIN — Medication 1 DROP(S): at 17:01

## 2023-10-09 RX ADMIN — ATOVAQUONE 1500 MILLIGRAM(S): 750 SUSPENSION ORAL at 11:08

## 2023-10-09 RX ADMIN — SIMETHICONE 80 MILLIGRAM(S): 80 TABLET, CHEWABLE ORAL at 17:02

## 2023-10-09 RX ADMIN — Medication 64 MILLIGRAM(S): at 06:44

## 2023-10-09 RX ADMIN — Medication 1 APPLICATION(S): at 06:44

## 2023-10-09 RX ADMIN — Medication 2: at 16:54

## 2023-10-09 RX ADMIN — Medication 25 MILLIGRAM(S): at 06:42

## 2023-10-09 RX ADMIN — ZINC OXIDE 1 APPLICATION(S): 200 OINTMENT TOPICAL at 07:04

## 2023-10-09 RX ADMIN — SODIUM CHLORIDE 5 MILLILITER(S): 9 INJECTION INTRAMUSCULAR; INTRAVENOUS; SUBCUTANEOUS at 07:04

## 2023-10-09 RX ADMIN — Medication 2: at 11:29

## 2023-10-09 RX ADMIN — NYSTATIN CREAM 1 APPLICATION(S): 100000 CREAM TOPICAL at 17:02

## 2023-10-09 NOTE — PROGRESS NOTE ADULT - ASSESSMENT
Assessment/Plan:  ALIZA FOLEY is a 62 year old female with PMH of pAFIB and sciatica; who presented to [facility] ED with SOB, and was found to have groundglass opacities and interstitial lung disease. She was treated with empiric Vancomycin and Zosyn. She underwent a bronchoscopy with bronchoalveolar lavage and pulse steroids. She was given IV diuretics for increased pulmonary congestion, but her respiratory status continued to worsen.  On 9/13, she was transferred to [facility] for ECMO requirements. She was further treated with Plasmapheresis, Rituximab and high-dose steroids, with significant improvement. Her overall hospital course was complicated by thrombocytopenia (s/p several platelet transfusions), leukocytosis (infectious workup entirely negative- s/p Vanco and Ceftriaxone), AFIB with RVR (resolved with Metoprolol), dysphagia (requiring NGT), transaminitis (secondary to acute illness and hypotension),  non-occlusive RIJ DVT (started on Lovenox). Patient now admitted for a multidisciplinary rehab program. 10-05-23 @ 13:18    * Vascular consult for RIJ non-occlusive thrombus   * Pulmonary consult --Interstitial lung disease with resp failure on Oxygen  * Wound care--Multiple wounds--perineal and buttock/sacral   * Rehab team to liason with outpatient Cardiologist regarding Eliquis     #Interstitial Lung Disease  - Gait Instability, ADL impairments and Functional impairments: start Comprehensive Rehab Program of PT/OT/SLP - 3 hours a day, 5 days a week  - P&O as needed   - Non-specific Interstitial Lung Disease  - Requiring ECMO   - Improvement s/p Plasmapheresis, Rituximab and high- dose steroids   - Albuterol/Ipratropium Nebulizer every 6 hours  - Mepron 1500mg daily  - PO Medrol ( due to liver dysfunction): Plan will be to taper by ~20% every 2 weeks  Medrol 64mg x 2 weeks  56mg x 2 weeks  44mg x 2 weeks   36mg x 2 weeks - Follow up Outpatient   -- Repeat CT Chest in 6 weeks   - Pulmonary following     #pAFIB  - Metoprolol 25mg BID   -- Cardiology consult--evaluation and possible switch from therapeutic lovenox to oral AC agent --hx A fib ( on rate control prior to acute hosp admission--previously declined AC),  and recent Rt IJ thrombus  - Rehab team to liason with outpatient Cardiologist regarding Eliquis      # Lymphedema both legs -chronic and Rt IJ thrombus  - BL LE doppler negative for DVT  - BL UE doppler with chronic thrombotic changes in RIJ   - Vascular consult     #Transaminitis   - Secondary to acute illness and hypotension at [facility]  - Trend LFTs  - Outpatient follow up     #Sleep  - Melatonin 6mg at HS    #Skin  - Skin: Left lower abdomen ruptured blister 3.0 x 1.0, stage 2 pressure injury to right buttock 4 x1 and left buttock 3x1, stage 2 pressure injury ti right inner thigh 0.2 x 0.2, right groin wound 10.5 x 2.0, Left groin wound 5 x 5,  right neck scab wound  -- MAD to skin folds- Nystatin to submammary and abdominal pannus BID  -- MAD to L groin- cleanse with NS, Triad cream daily  -- Mad to R groin- Nystatin powder daily   -- R groin wound-- aquacel packing, Triad to periwound, Allevyn foam- daily and PRN   - Pressure injury/Skin: OOB to Chair, PT/OT   - Offload pressure, low air loss support surfaces, T&P every 2 hours   --Wound care consult--Multiple wounds--perineal and buttock/sacral      #Pain Mgmt   - Tylenol PRN     #GI/Bowel Mgmt   - Pantoprazole  - Senna   - PRN: Maalox     #/Bladder Mgmt --voiding     #FEN   #Dysphagia  - Diet - Minced & Moist  [CC]    - Dysphaiga- SLP evaluation     #Health maintenance  - Folic Acid   - MVI  - Ocean nasal spray    #Precautions / PROPHYLAXIS:   - Falls  - ortho: Weight bearing status: WBAT   - Lungs: Aspiration, Incentive Spirometer   - DVT: Lovenox 70mg BID  - RIJ DVT found at ECMO cannula   ---------------------------  Liaison with family  Patient's partner present during review, details of review d/w her, detailed explanation of therapy protocol explained and she was happy with same    Liaison with providers  consults ordered--cardiac, pulmonary, wound care    OUTPATIENT/FOLLOW UP:    Trae Whitney  Pulmonary Disease  [address], [address] [place] [address]  Phone: [phone]  Fax: [phone]  Follow Up Time: 2 weeks    Ryanne Allen  Rheumatology  [address] [address]  Phone: [phone]  Fax: [phone]  Follow Up Time: 1 week    Kyle Pierce  Internal Medicine  [address] [place] [address], [address]  [place] [address]  Phone: [phone]  Fax: [phone]  Follow Up Time: 2 weeks    Addy Powers  Pulmonary Disease  [address] [facility] [address]  [place], [place] [id number]  Phone: [phone]  Fax: [phone]  Follow Up Time:     Kwame Hawley  Critical Care Medicine  [address] [facility] [address]  [place] [address]  Phone: [phone]  Fax: [phone]  Follow Up Time:     Neena Borrego  Rheumatology  [address] [place] [address], [address]  [place] [address]  Phone: [phone]  Fax: [phone]  Follow Up Time:    [facility]  [facility] [address]  Scheduled Appointment: 09/13/2023  2:40 PM  --------------------------- Assessment/Plan:  ALIZA FOLEY is a 62 year old female with PMH of pAFIB and sciatica; who presented to [facility] ED with SOB, and was found to have groundglass opacities and interstitial lung disease. She was treated with empiric Vancomycin and Zosyn. She underwent a bronchoscopy with bronchoalveolar lavage and pulse steroids. She was given IV diuretics for increased pulmonary congestion, but her respiratory status continued to worsen.  On 9/13, she was transferred to [facility] for ECMO requirements. She was further treated with Plasmapheresis, Rituximab and high-dose steroids, with significant improvement. Her overall hospital course was complicated by thrombocytopenia (s/p several platelet transfusions), leukocytosis (infectious workup entirely negative- s/p Vanco and Ceftriaxone), AFIB with RVR (resolved with Metoprolol), dysphagia (requiring NGT), transaminitis (secondary to acute illness and hypotension),  non-occlusive RIJ DVT (started on Lovenox). Patient now admitted for a multidisciplinary rehab program. 10-05-23 @ 13:18    * Vascular consult for RIJ non-occlusive thrombus   * Wound care--Multiple wounds--perineal and buttock/sacral   * Rehab team to liason with outpatient Cardiologist regarding Eliquis   * Ace wrap to both legs for edema managment  * Labs--stable anemia, Bld sugar normal, on tapering steroid dose  * Blood draws from Left arm Medline     #Interstitial Lung Disease  - Gait Instability, ADL impairments and Functional impairments: start Comprehensive Rehab Program of PT/OT/SLP - 3 hours a day, 5 days a week  - P&O as needed   - Non-specific Interstitial Lung Disease  - Requiring ECMO   - Improvement s/p Plasmapheresis, Rituximab and high- dose steroids   - Albuterol/Ipratropium Nebulizer every 6 hours  - Mepron 1500mg daily  - PO Medrol ( due to liver dysfunction): Plan will be to taper by ~20% every 2 weeks  Medrol 64mg x 2 weeks  56mg x 2 weeks  44mg x 2 weeks   36mg x 2 weeks - Follow up Outpatient   -- Repeat CT Chest in 6 weeks   - Pulmonary following     #pAFIB  - Metoprolol 25mg BID   -- Cardiology consult--evaluation and possible switch from therapeutic lovenox to oral AC agent --hx A fib ( on rate control prior to acute hosp admission--previously declined AC),  and recent Rt IJ thrombus  - Rehab team to liason with outpatient Cardiologist regarding Eliquis      # Lymphedema both legs -chronic and Rt IJ thrombus  - BL LE doppler negative for DVT  - BL UE doppler with chronic thrombotic changes in RIJ   - Vascular consult     #Transaminitis   - Secondary to acute illness and hypotension at [facility]  - Trend LFTs  - Outpatient follow up     #Sleep  - Melatonin 6mg at HS    #Skin  - Skin: Left lower abdomen ruptured blister 3.0 x 1.0, stage 2 pressure injury to right buttock 4 x1 and left buttock 3x1, stage 2 pressure injury ti right inner thigh 0.2 x 0.2, right groin wound 10.5 x 2.0, Left groin wound 5 x 5,  right neck scab wound  -- MAD to skin folds- Nystatin to submammary and abdominal pannus BID  -- MAD to L groin- cleanse with NS, Triad cream daily  -- Mad to R groin- Nystatin powder daily   -- R groin wound-- aquacel packing, Triad to periwound, Allevyn foam- daily and PRN   - Pressure injury/Skin: OOB to Chair, PT/OT   - Offload pressure, low air loss support surfaces, T&P every 2 hours   --Wound care consult--Multiple wounds--perineal and buttock/sacral      #Pain Mgmt   - Tylenol PRN     #GI/Bowel Mgmt   - Pantoprazole  - Senna   - PRN: Maalox     #/Bladder Mgmt --voiding     #FEN   #Dysphagia  - Diet - Minced & Moist  [CC]    - Dysphaiga- SLP evaluation     #Health maintenance  - Folic Acid   - MVI  - Ocean nasal spray    # Difficulty with access to peripheral veins--  * Blood draws from Left arm Medline     #Precautions / PROPHYLAXIS:   - Falls  - ortho: Weight bearing status: WBAT   - Lungs: Aspiration, Incentive Spirometer   - DVT: Lovenox 70mg BID--switch to oals after clearing with patient's cardiologist   - RIJ DVT found at ECMO cannula   ---------------------------    * Labs--stable anemia, Bld sugar normal, on tapering steroid dose    Liaison with family  Patient's partner present during review, details of review d/w her, detailed explanation of therapy protocol explained and she was happy with same  10/9--Partner present during review and discussed details treatment plan with her    Liaison with providers  Consult recs form multiple specialities being implemented  Pending vascular consult     OUTPATIENT/FOLLOW UP:    Trae Whitney  Pulmonary Disease  [address], [address] [place] [address]  Phone: [phone]  Fax: [phone]  Follow Up Time: 2 weeks    Ryanne Allen  Rheumatology  [address] [address]  Phone: [phone]  Fax: [phone]  Follow Up Time: 1 week    Kyle Pierce  Internal Medicine  [address] [place] [address], [address]  [place] [address]  Phone: [phone]  Fax: [phone]  Follow Up Time: 2 weeks    Addy Powers  Pulmonary Disease  410 [facility], [address] [id number]  Phone: [phone]  Fax: [phone]  Follow Up Time:     Kwame Hawley  Critical Care Medicine  410 [facility], [address] [address]  Phone: [phone]  Fax: [phone]  Follow Up Time:     Neena Borrego  Rheumatology  [address] [facility] [place], [address]  [place] [address]  Phone: [phone]  Fax: [phone]  Follow Up Time:    [facility]  [facility] [address]  Scheduled Appointment: 09/13/2023  2:40 PM  --------------------------- Assessment/Plan:  ALIZA FOLEY is a 62 year old female with PMH of pAFIB and sciatica; who presented to [facility] ED with SOB, and was found to have groundglass opacities and interstitial lung disease. She was treated with empiric Vancomycin and Zosyn. She underwent a bronchoscopy with bronchoalveolar lavage and pulse steroids. She was given IV diuretics for increased pulmonary congestion, but her respiratory status continued to worsen.  On 9/13, she was transferred to [facility] for ECMO requirements. She was further treated with Plasmapheresis, Rituximab and high-dose steroids, with significant improvement. Her overall hospital course was complicated by thrombocytopenia (s/p several platelet transfusions), leukocytosis (infectious workup entirely negative- s/p Vanco and Ceftriaxone), AFIB with RVR (resolved with Metoprolol), dysphagia (requiring NGT), transaminitis (secondary to acute illness and hypotension),  non-occlusive RIJ DVT (started on Lovenox). Patient now admitted for a multidisciplinary rehab program. 10-05-23 @ 13:18    * Vascular consult for RIJ non-occlusive thrombus   * Wound care--Multiple wounds--perineal and buttock/sacral   * Rehab team to liason with outpatient Cardiologist regarding Eliquis   * Ace wrap to both legs for edema managment  * Labs--stable anemia, Bld sugar normal, on tapering steroid dose  * Blood draws from Left arm Medline   *Recommence lovenox    #Interstitial Lung Disease  - Gait Instability, ADL impairments and Functional impairments: start Comprehensive Rehab Program of PT/OT/SLP - 3 hours a day, 5 days a week  - P&O as needed   - Non-specific Interstitial Lung Disease  - Requiring ECMO   - Improvement s/p Plasmapheresis, Rituximab and high- dose steroids   - Albuterol/Ipratropium Nebulizer every 6 hours  - Mepron 1500mg daily  - PO Medrol ( due to liver dysfunction): Plan will be to taper by ~20% every 2 weeks  Medrol 64mg x 2 weeks  56mg x 2 weeks  44mg x 2 weeks   36mg x 2 weeks - Follow up Outpatient   -- Repeat CT Chest in 6 weeks   - Pulmonary following     #pAFIB/Rt Ij thrombus  - Metoprolol 25mg BID and lovenox  -- Cardiology consult--recm can switch to oral AC  --Await discussion with patient's cardiologist before switch to oral AC as requested by patient   (recent GI bleed, from hemorrhoid, resolved)        # Lymphedema both legs -chronic and Rt IJ thrombus  - BL LE doppler negative for DVT  - BL UE doppler with chronic thrombotic changes in RIJ   - Vascular consult     #Transaminitis   - Secondary to acute illness and hypotension at [facility]  - Trend LFTs  - Outpatient follow up     #Sleep  - Melatonin 6mg at HS    #Skin  - Skin: Left lower abdomen ruptured blister 3.0 x 1.0, stage 2 pressure injury to right buttock 4 x1 and left buttock 3x1, stage 2 pressure injury ti right inner thigh 0.2 x 0.2, right groin wound 10.5 x 2.0, Left groin wound 5 x 5,  right neck scab wound  -- MAD to skin folds- Nystatin to submammary and abdominal pannus BID  -- MAD to L groin- cleanse with NS, Triad cream daily  -- Mad to R groin- Nystatin powder daily   -- R groin wound-- aquacel packing, Triad to periwound, Allevyn foam- daily and PRN   - Pressure injury/Skin: OOB to Chair, PT/OT   - Offload pressure, low air loss support surfaces, T&P every 2 hours   --Wound care consult--Multiple wounds--perineal and buttock/sacral      #Pain Mgmt   - Tylenol PRN     #GI/Bowel Mgmt   - Pantoprazole  - Senna   - PRN: Maalox     #/Bladder Mgmt --voiding     #FEN   #Dysphagia  - Diet - Minced & Moist  [CC]    - Dysphaiga- SLP evaluation     #Health maintenance  - Folic Acid   - MVI  - Ocean nasal spray    # Difficulty with access to peripheral veins--  * Blood draws from Left arm Medline     #Precautions / PROPHYLAXIS:   - Falls  - ortho: Weight bearing status: WBAT   - Lungs: Aspiration, Incentive Spirometer   - DVT: Lovenox 70mg BID--switch to oals after clearing with patient's cardiologist   - RI DVT found at ECMO cannula   ---------------------------    * Labs--stable anemia, Bld sugar normal, on tapering steroid dose    Liaison with family  Patient's partner present during review, details of review d/w her, detailed explanation of therapy protocol explained and she was happy with same  10/9--Partner present during review and discussed details treatment plan with her    Liaison with providers  Consult recs form multiple specialities being implemented  Pending vascular consult     OUTPATIENT/FOLLOW UP:    Trae Whitney  Pulmonary Disease  [address], [address] [place] [address]  Phone: [phone]  Fax: [phone]  Follow Up Time: 2 weeks    Ryanne Allen  Rheumatology  [address] [address]  Phone: [phone]  Fax: [phone]  Follow Up Time: 1 week    Kyle Pierce  Internal Medicine  [address] [place] [address], [address]  [place] [address]  Phone: [phone]  Fax: [phone]  Follow Up Time: 2 weeks    Addy Powers  Pulmonary Disease  410 [facility], [facility] [address]  [place], [place] [id number]  Phone: [phone]  Fax: [phone]  Follow Up Time:     Kwame Hawley  Critical Care Medicine  [address] [facility], [facility] [address]  [place] [address]  Phone: [phone]  Fax: [phone]  Follow Up Time:     Neena Borrego  Rheumatology  [address] [facility], [address]  [place] [address]  Phone: [phone]  Fax: [phone]  Follow Up Time:    [facility]  [facility] [address]  Scheduled Appointment: 09/13/2023  2:40 PM  ---------------------------

## 2023-10-09 NOTE — CONSULT NOTE ADULT - ASSESSMENT
62 [facility] A[facility]Fib with recently diagnosed MCTD-ILD who was admitted to [facility] with acute hypoxemic respiratory failure that initially responded to steroids, then with worsening after taper with development of acute hypoxemic and hypercapnic respiratory failure requiring intubation and ECMO on 9/13. Vascular surgery consulted for Ultrasound findings. Pt in bed comfortable, denies any swelling or pain on Upper extremities. Admits to sever weakness on Upper extremity unchanged/ denies chest pain, sob,n,v

## 2023-10-09 NOTE — PROGRESS NOTE ADULT - NS ATTEND AMEND GEN_ALL_CORE FT
Seen and examined  No acute distress    Interval hemorrhoidal bleed resolved  Epistaxis on f/u with ENT    Slept well, normal resp on NC oxy  No acute distress  Fully oriented able to follow commands    Labs--Hb stable  Access issues, hence other labs not done, but subsequently samples can be taken from left arm Medline    Reports bowel and bladder continence  Impaired mobility due to edema, fatigue, resp failure and debility    Cardiology review and recs appreciated    Continue therapy  Vascular consult   Wound care consult   Ace wrap to legs  AC oral, will be commenced after discussion and agreement with her cardiologist   Discussed update with partner present during review   Dietary f/u for obesity and advice on food choices     Time based treatment   Spent 64 mins, patient review, discussion with family, liaison with other providers, hospitalist, cardiology, and care co ordination Seen and examined  No acute distress    Interval hemorrhoidal bleed resolved  Epistaxis on f/u with ENT    Slept well, normal resp on NC oxy  No acute distress  Fully oriented able to follow commands    Labs--Hb stable  Access issues, hence other labs not done, but subsequently samples can be taken from left arm Medline    Reports bowel and bladder continence  Impaired mobility due to edema, fatigue, resp failure and debility    Cardiology review and recs appreciated    Continue therapy  Vascular consult   Continue lovenox and switch to oral AC after clarification with patient's cardiologist (name to be confirmed from patient)   Wound care consult   Ace wrap to legs  AC oral, will be commenced after discussion and agreement with her cardiologist   Discussed update with partner present during review   Dietary f/u for obesity and advice on food choices     Time based treatment   Spent 64 mins, patient review, discussion with family, liaison with other providers, hospitalist, cardiology, and care co ordination

## 2023-10-09 NOTE — CONSULT NOTE ADULT - SUBJECTIVE AND OBJECTIVE BOX
62 yoF [facility] A-Fib with recently diagnosed MCTD-ILD who was admitted to [facility] with acute hypoxemic respiratory failure that initially responded to steroids, then with worsening after taper with development of acute hypoxemic and hypercapnic respiratory failure requiring intubation and ECMO on 9/13, then transferred to [facility], concerning for DAH vs ILD flare, decannulated from ECMO 9/21, extubated 9/22. S/p pulse steroids, PLEX (9/15, 9/18, 9/20) and Rituximab (9/16 & 10/3). Course c/b severe leukopenia s/p filgastrim, shock liver with slow to resolved hyperbilirubinemia, RIJ DVT, oropharyngeal dysphagia, and recurrent SVT. Repeat CT chest on 9/30 with markedly improved bilateral groundglass opacities with resolved lung consolidations. Now in acute rehab on 4 lpm NC oxygen and tapering dose of medrol. Vascular surgery consulted on AC need for US findings. Pt in bed comfortable, denies any swelling or pain on Upper extremities. Admits to sever weakness on Upper extremity unchanged/ denies chest pain, sob,n,v    PAST MEDICAL & SURGICAL HISTORY:  Afib      Sciatica      MEDICATIONS  (STANDING):  ammonium lactate 12% Lotion 1 Application(s) Topical every 12 hours  artificial  tears Solution 1 Drop(s) Both EYES every 12 hours  ascorbic acid 500 milliGRAM(s) Oral daily  atovaquone  Suspension 1500 milliGRAM(s) Oral daily  dextrose 5%. 1000 milliLiter(s) (50 mL/Hr) IV Continuous <Continuous>  dextrose 5%. 1000 milliLiter(s) (100 mL/Hr) IV Continuous <Continuous>  dextrose 50% Injectable 12.5 Gram(s) IV Push once  dextrose 50% Injectable 25 Gram(s) IV Push once  dextrose 50% Injectable 25 Gram(s) IV Push once  folic acid 1 milliGRAM(s) Oral daily  glucagon  Injectable 1 milliGRAM(s) IntraMuscular once  hemorrhoidal Ointment 1 Application(s) Rectal four times a day  influenza   Vaccine 0.5 milliLiter(s) IntraMuscular once  insulin lispro (ADMELOG) corrective regimen sliding scale   SubCutaneous Before meals and at bedtime  melatonin 6 milliGRAM(s) Oral at bedtime  methylPREDNISolone 64 milliGRAM(s) Oral daily  metoprolol tartrate 25 milliGRAM(s) Oral two times a day  multivitamin/minerals/iron Oral Solution (CENTRUM) 15 milliLiter(s) Oral daily  nystatin Powder 1 Application(s) Topical every 12 hours  pantoprazole    Tablet 40 milliGRAM(s) Oral before breakfast  sodium chloride 0.9% lock flush 5 milliLiter(s) IV Push two times a day  zinc oxide 40% Paste 1 Application(s) Topical two times a day  zinc sulfate 220 milliGRAM(s) Oral daily    MEDICATIONS  (PRN):  albuterol/ipratropium for Nebulization 3 milliLiter(s) Nebulizer every 6 hours PRN Shortness of Breath and/or Wheezing  aluminum hydroxide/magnesium hydroxide/simethicone Suspension 30 milliLiter(s) Oral every 4 hours PRN Dyspepsia  dextrose Oral Gel 15 Gram(s) Oral once PRN Blood Glucose LESS THAN 70 milliGRAM(s)/deciliter  simethicone 80 milliGRAM(s) Chew four times a day PRN Gas  sodium chloride 0.65% Nasal 1 Spray(s) Both Nostrils every 8 hours PRN Nasal Congestion                          9.6    13.92 )-----------( 209      ( 08 Oct 2023 11:00 )             30.8   RUE US: NTERPRETATION:  CLINICAL INFORMATION: Prior right IJ nonocclusive   thrombus. Follow-up.    COMPARISON: None available.    TECHNIQUE: Duplex sonography of the RIGHT UPPER extremity veins with   color and spectral Doppler, with and without compression.    FINDINGS:  The right IJV is partially compressible and demonstrates mural thickening.  The right subclavian, axillary and brachial veins are patent and   compressible where applicable.  The basilic vein (superficial vein) is   patent and without thrombus.  The cephalic vein (superficial vein) is   thrombosed in the forearm.    Doppler examination shows normal spontaneous and phasic flow.    IMPRESSION:  No evidence of right upper extremity deep venous thrombosis.  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  Superficial thrombosis of right cephalic vein in the forearm.

## 2023-10-09 NOTE — PROGRESS NOTE ADULT - SUBJECTIVE AND OBJECTIVE BOX
Follow-up Pulmonary Progress Note  Chief Complaint : Interstitial lung disease    Patient seen and examined  comfortable  no cp, sob, palp, n/v  remains weak    on 3 L sat 96%, tapered to 2 L     Allergies :No Known Allergies  lactose (Unknown)      PAST MEDICAL & SURGICAL HISTORY:  Afib    Sciatica        Medications:  MEDICATIONS  (STANDING):  ammonium lactate 12% Lotion 1 Application(s) Topical every 12 hours  artificial  tears Solution 1 Drop(s) Both EYES every 12 hours  ascorbic acid 500 milliGRAM(s) Oral daily  atovaquone  Suspension 1500 milliGRAM(s) Oral daily  dextrose 5%. 1000 milliLiter(s) (50 mL/Hr) IV Continuous <Continuous>  dextrose 5%. 1000 milliLiter(s) (100 mL/Hr) IV Continuous <Continuous>  dextrose 50% Injectable 12.5 Gram(s) IV Push once  dextrose 50% Injectable 25 Gram(s) IV Push once  dextrose 50% Injectable 25 Gram(s) IV Push once  folic acid 1 milliGRAM(s) Oral daily  glucagon  Injectable 1 milliGRAM(s) IntraMuscular once  hemorrhoidal Ointment 1 Application(s) Rectal four times a day  influenza   Vaccine 0.5 milliLiter(s) IntraMuscular once  insulin lispro (ADMELOG) corrective regimen sliding scale   SubCutaneous Before meals and at bedtime  melatonin 6 milliGRAM(s) Oral at bedtime  methylPREDNISolone 64 milliGRAM(s) Oral daily  metoprolol tartrate 25 milliGRAM(s) Oral two times a day  multivitamin/minerals/iron Oral Solution (CENTRUM) 15 milliLiter(s) Oral daily  nystatin Powder 1 Application(s) Topical every 12 hours  pantoprazole    Tablet 40 milliGRAM(s) Oral before breakfast  sodium chloride 0.9% lock flush 5 milliLiter(s) IV Push two times a day  zinc oxide 40% Paste 1 Application(s) Topical two times a day  zinc sulfate 220 milliGRAM(s) Oral daily    MEDICATIONS  (PRN):  albuterol/ipratropium for Nebulization 3 milliLiter(s) Nebulizer every 6 hours PRN Shortness of Breath and/or Wheezing  aluminum hydroxide/magnesium hydroxide/simethicone Suspension 30 milliLiter(s) Oral every 4 hours PRN Dyspepsia  dextrose Oral Gel 15 Gram(s) Oral once PRN Blood Glucose LESS THAN 70 milliGRAM(s)/deciliter  simethicone 80 milliGRAM(s) Chew four times a day PRN Gas  sodium chloride 0.65% Nasal 1 Spray(s) Both Nostrils every 8 hours PRN Nasal Congestion      Antibiotics History  atovaquone  Suspension 1500 milliGRAM(s) Oral daily, 10-06-23 @ 00:00      Heme Medications       GI Medications  aluminum hydroxide/magnesium hydroxide/simethicone Suspension 30 milliLiter(s) Oral every 4 hours, 10-05-23 @ 18:55, Routine PRN  pantoprazole    Tablet 40 milliGRAM(s) Oral before breakfast, 10-06-23 @ 00:00, Routine  simethicone 80 milliGRAM(s) Chew four times a day, 10-07-23 @ 23:29, STAT PRN        LABS:                        9.6    13.92 )-----------( 209      ( 08 Oct 2023 11:00 )             30.8      CULTURES: (if applicable)    Culture - Blood (collected 09-27-23 @ 19:50)  Source: .Blood Blood-Peripheral  Final Report (10-03-23 @ 01:00):    No growth at 5 days    Culture - Urine (collected 09-27-23 @ 18:26)  Source: Catheterized Catheterized  Final Report (09-28-23 @ 21:25):    No growth    Culture - Blood (collected 09-27-23 @ 14:30)  Source: .Blood Blood-Peripheral  Final Report (10-02-23 @ 20:01):    No growth at 5 days      Rapid RVP Result: NotDetec (09-30-23 @ 12:52)     RADIOLOGY  CXR:  < from: Xray Chest 1 View- PORTABLE-Urgent (Xray Chest 1 View- PORTABLE-Urgent .) (10.05.23 @ 20:41) >  INTERPRETATION:  CLINICAL INDICATION: 62 years  Female with picc line.    COMPARISON: 9/24/2023    AP view of the chest demonstrates mild bibasilar discoid atelectasis. No   focal consolidation. There is no pleural effusion. The pulmonary   vasculature is normal. There is no pneumothorax.    The heart is normal in size. There is no mediastinal or hilar mass.    Mild thoracic degenerative changes are present.    Enteric contrast visualized in the left colon from recent modified barium   swallow.    IMPRESSION:    No acute infiltrate. Mild bibasilar discoid atelectasis.    --- End of Report ---    < end of copied text >      CT:  < from: CT Chest w/ IV Cont (09.30.23 @ 15:20) >  ACC: [id number] EXAM:  CT ABDOMEN AND PELVIS IC   ORDERED BY: ALEXUS GARDNER     ACC: [id number] EXAM:  CT CHEST IC   ORDERED BY: ALEXUS GARDNER     PROCEDURE DATE:  09/30/2023          INTERPRETATION:  CLINICAL INFORMATION: Hypoxia. Status post ECMO.   Evaluate for abscess near ECMO cannulation site.    COMPARISON: CT chest, abdomen, and pelvis 9/14/2023    CONTRAST/COMPLICATIONS:  IV Contrast: IV contrast documented in unlinked concurrent exam   (accession [id number]), Omnipaque 350 (accession [id number])  90 cc   administered   10 cc discarded  Oral Contrast: NONE  Complications: None reported at time of study completion    PROCEDURE:  CT of the Chest, Abdomen and Pelvis was performed.  Sagittal and coronal reformats were performed.    FINDINGS:  CHEST:  LUNGS AND LARGE AIRWAYS: Patent central airways. Bilateral groundglass   lung opacities, improved from the extensive lung consolidations seen on   9/14/2023. Reticular peripheral lung opacities. Atelectasis within the   lower lobes.  PLEURA: No pleural effusion.  VESSELS: Within normal limits. Interval removal of ECMO catheters.  HEART: Enlarged. No pericardial effusion.  MEDIASTINUM AND NIRMALA: No lymphadenopathy.  CHEST WALL AND LOWER NECK: Within normal limits.    ABDOMEN AND PELVIS:  LIVER: Unchanged subcentimeter hypodensity in the caudate lobe, likely a   cyst.  BILE DUCTS: Normal caliber.  GALLBLADDER: Distended. Subtle layering density within the gallbladder,   consistent with sludge as seen on prior ultrasound. No wall thickening.   No pericholecystic fluid.  SPLEEN: Within normal limits.  PANCREAS: Within normal limits.  ADRENALS: Within normal limits.  KIDNEYS/URETERS: Subcentimeter hypodensities bilaterally too small to   categorize.    BLADDER: Within normal limits.  REPRODUCTIVE ORGANS: Calcified uterine fibroid.    BOWEL: Small hiatal hernia. Weighted tip of the enteric tube terminates   within the stomach. No bowel obstruction. Sigmoid diverticulosis, without   evidence of acute diverticulitis. Moderate volumestool distending the   rectum. Appendix is normal.  PERITONEUM: No ascites.  VESSELS: Within normal limits. Interval removal of ECMO catheters.  RETROPERITONEUM/LYMPH NODES: No lymphadenopathy.  ABDOMINAL WALL: Tiny fat-containing umbilical hernia. No fluid collection   or evidence of infection at the right groin near prior ECMO cannulation   site.  BONES: Degenerative changes of the spine.    IMPRESSION:  1.  Bilateral groundglass opacities consistent with pulmonary edema,   markedly improved from the previously seen extensive lung consolidations   on 9/14/2023.  2.  Interval removal of ECMO catheters. No right groin abscess or   evidence of infection at prior ECMO calculation site.    --- End of Report ---    < end of copied text >    ECHO:      VITALS:  T(C): 36.5 (10-08-23 @ 20:58), Max: 36.5 (10-08-23 @ 20:58)  T(F): 97.7 (10-08-23 @ 20:58), Max: 97.7 (10-08-23 @ 20:58)  HR: 68 (10-09-23 @ 08:00) (68 - 78)  BP: 112/78 (10-09-23 @ 08:00) (105/66 - 116/71)  BP(mean): --  ABP: --  ABP(mean): --  RR: 16 (10-09-23 @ 08:00) (16 - 16)  SpO2: 97% (10-09-23 @ 08:00) (97% - 97%)  CVP(mm Hg): --  CVP(cm H2O): --    Ins and Outs

## 2023-10-09 NOTE — PROGRESS NOTE ADULT - ASSESSMENT
62 year old female with PMH of pAFIB and sciatica; who presented to [facility] ED with SOB, and was found to have groundglass opacities and interstitial lung disease. She was treated with empiric Vancomycin and Zosyn. She underwent a bronchoscopy with bronchoalveolar lavage and pulse steroids. She was given IV diuretics for increased pulmonary congestion, but her respiratory status continued to worsen.  On 9/13, she was transferred to [facility] for ECMO requirements. She was further treated with Plasmapheresis, Rituximab and high-dose steroids, with significant improvement. Her overall hospital course was complicated by thrombocytopenia (s/p several platelet transfusions), leukocytosis (infectious workup entirely negative- s/p Vanco and Ceftriaxone), AFIB with RVR (resolved with Metoprolol), dysphagia (requiring NGT), transaminitis (secondary to acute illness and hypotension),  non-occlusive RIJ DVT (started on Lovenox). Patient now admitted for a multidisciplinary rehab program- pt/ot/dvt ppx    #Interstitial Lung Disease, s/p ECMO   - s/p Plasmapheresis, Rituximab and high- dose steroids   - Albuterol/Ipratropium Nebulizer every 6 hours  - Mepron   - PO Medrol taper- Plan will be to taper by ~20% every 2 weeks  - Repeat CT Chest in 6 weeks   - o2 support prn  - pulm and cardio consult recc noted    #pAFIB  - Metoprolol     - DVT: Lovenox 70mg BID- switch to po doac per patients cardiology  - RIJ DVT found at ECMO cannula     #Transaminitis, jaundice  - Secondary to acute illness and hypotension at [facility]  - Trend LFTs, bili-  downtrending  - Outpatient follow up     #Sleep  - Melatonin       will follow  d/w rehab team

## 2023-10-09 NOTE — PROGRESS NOTE ADULT - SUBJECTIVE AND OBJECTIVE BOX
62 year old female with PMH of pAFIB and sciatica; who presented to [facility] ED with SOB. She has been experiencing chronic SOB and non-productive cough for several months and was worked up in [place] where she had a CT scan which resulted negative.  She has been evaluated by Pulmonology and Rheumatology and was ruled out for lupus and immunological disorders.  She recently went to Urgent Care due to SOB with ambulating short distances (12-15 feet), and an XR was concerning for BL LL infiltrates. While at [facility],  CXR and CTA were significant for ground glass opacities, and interstitial lung disease, respectively. She was treated with empiric Vancomycin and Zosyn. She underwent a bronchoscopy with bronchoalveolar lavage and pulse steroids. She was given IV diuretics for increased pulmonary congestion, but her respiratory status continued to worsen.  On 9/13, she was transferred to [facility] for ECMO requirements. She was further treated with Plasmapheresis, Rituximab and high-dose steroids, with significant improvement.   Her overall hospital course was complicated by thrombocytopenia (s/p several platelet transfusions), leukocytosis (infectious workup entirely negative- s/p Vanco and Ceftriaxone), AFIB with RVR (resolved with Metoprolol), dysphagia (requiring NGT), transaminitis (secondary to acute illness and hypotension),  non-occlusive RIJ DVT (started on Lovenox). PM&R was consulted and deemed her an appropriate candidate for IRF. She was medically stabilized and cleared for discharge to [facility].     seen at the bedside, no headache, no sob, no CP, no dizziness.      Vital Signs Last 24 Hrs  T(C): 36.5 (08 Oct 2023 20:58), Max: 36.5 (08 Oct 2023 20:58)  T(F): 97.7 (08 Oct 2023 20:58), Max: 97.7 (08 Oct 2023 20:58)  HR: 68 (09 Oct 2023 08:00) (68 - 78)  BP: 112/78 (09 Oct 2023 08:00) (105/66 - 116/71)  BP(mean): --  RR: 16 (09 Oct 2023 08:00) (16 - 16)  SpO2: 97% (09 Oct 2023 08:00) (97% - 97%)    Parameters below as of 09 Oct 2023 08:00  Patient On (Oxygen Delivery Method): nasal cannula  O2 Flow (L/min): 3      GENERAL- NAD  EAR/NOSE/MOUTH/THROAT -  MMM  EYES- MADAI, conjunctiva and Sclera clear- icturus+  NECK- supple  RESPIRATORY-  clear to auscultation bilaterally, non laboured breathing  CARDIOVASCULAR - SIS2, RRR  GI - soft NT BS present  EXTREMITIES-  b/l LE edema  NEUROLOGY-  b/l UE AND LE weakness  PSYCHIATRY- AAO X 3                  9.6                  x    | x    | x            13.92 >-----------< 209     ------------------------< x                     30.8                 x    | x    | x                                            Ca x     Mg x     Ph x          CAPILLARY BLOOD GLUCOSE      POCT Blood Glucose.: 194 mg/dL (09 Oct 2023 11:27)  POCT Blood Glucose.: 109 mg/dL (09 Oct 2023 08:03)  POCT Blood Glucose.: 150 mg/dL (08 Oct 2023 21:58)  POCT Blood Glucose.: 203 mg/dL (08 Oct 2023 17:10)      MEDICATIONS  (STANDING):  ammonium lactate 12% Lotion 1 Application(s) Topical every 12 hours  artificial  tears Solution 1 Drop(s) Both EYES every 12 hours  ascorbic acid 500 milliGRAM(s) Oral daily  atovaquone  Suspension 1500 milliGRAM(s) Oral daily  folic acid 1 milliGRAM(s) Oral daily  glucagon  Injectable 1 milliGRAM(s) IntraMuscular once  hemorrhoidal Ointment 1 Application(s) Rectal four times a day  influenza   Vaccine 0.5 milliLiter(s) IntraMuscular once  insulin lispro (ADMELOG) corrective regimen sliding scale   SubCutaneous Before meals and at bedtime  melatonin 6 milliGRAM(s) Oral at bedtime  methylPREDNISolone 64 milliGRAM(s) Oral daily  metoprolol tartrate 25 milliGRAM(s) Oral two times a day  multivitamin/minerals/iron Oral Solution (CENTRUM) 15 milliLiter(s) Oral daily  nystatin Powder 1 Application(s) Topical every 12 hours  pantoprazole    Tablet 40 milliGRAM(s) Oral before breakfast  sodium chloride 0.9% lock flush 5 milliLiter(s) IV Push two times a day  zinc oxide 40% Paste 1 Application(s) Topical two times a day  zinc sulfate 220 milliGRAM(s) Oral daily    MEDICATIONS  (PRN):  albuterol/ipratropium for Nebulization 3 milliLiter(s) Nebulizer every 6 hours PRN Shortness of Breath and/or Wheezing  aluminum hydroxide/magnesium hydroxide/simethicone Suspension 30 milliLiter(s) Oral every 4 hours PRN Dyspepsia  dextrose Oral Gel 15 Gram(s) Oral once PRN Blood Glucose LESS THAN 70 milliGRAM(s)/deciliter  simethicone 80 milliGRAM(s) Chew four times a day PRN Gas  sodium chloride 0.65% Nasal 1 Spray(s) Both Nostrils every 8 hours PRN Nasal Congestion

## 2023-10-09 NOTE — PROGRESS NOTE ADULT - SUBJECTIVE AND OBJECTIVE BOX
CC: Non-specific Interstitial Lung Disease     Today's Subjective & Objective Findings:  Patient seen and examined at bedside this AM with Dr. López.  Patient partner present during encounter.   Last BM on 10/8, per patient.  No other complaints at this time    Denies headache, dizziness, visual changes, chest pain, SOB/MANZANO, abdominal pain, nausea, vomiting, diarrhea, dysuria, numbness or tingling.    Therapy-- engaging, motivated    VITALS  T(C): 36.5 (10-08-23 @ 20:58), Max: 36.5 (10-08-23 @ 20:58)  HR: 68 (10-09-23 @ 08:00) (68 - 78)  BP: 112/78 (10-09-23 @ 08:00) (102/67 - 116/71)  RR: 16 (10-09-23 @ 08:00) (16 - 16)  SpO2: 97% (10-09-23 @ 08:00) (97% - 99%)      PHYSICAL EXAM:  Gen - NAD, Comfortable  HEENT - NCAT, EOMI, MMM  Neck - Supple, No limited ROM, O2 via NC  Pulm - diminished across lung field  Cardiovascular - RRR, S1S2  Chest - good chest expansion, good respiratory effort  Abdomen - Soft, NT/ND, +BS  Extremities - No Cyanosis, no clubbing, 2+ edema to b/l feet, no calf tenderness, LUE SL PICC  Neuro-     Cognitive - awake, alert, oriented to person, place, date, year.  Able to follow command     Communication -hypophonic but comprehensible     Attention: Intact     Memory:  memory intact     Cranial Nerves - CN 2-12 intact.     Motor -                    LEFT    UE - 4/5                    RIGHT UE - 4/5                     LEFT    LE - 2 +-/5 limited by leg edema                    RIGHT LE - 2+-/5  limitted by leg edema      Sensory - Intact        Reflexes - DTR 1+      Coordination - FTN  impaired  due to weakness   MSK: soreness and weakness  Psychiatric - Mood stable, Affect WNL  Skin: Left lower abdomen ruptured blister 3.0 x 1.0, stage 2 pressure injury to right buttock 4 x1 and left buttock 3x1, stage 2 pressure injury ti right inner thigh 0.2 x 0.2, right groin wound 10.5 x 2.0, Left groin wound 5 x 5,  right neck scab wound        LABS:                        9.6    13.92 )-----------( 209      ( 08 Oct 2023 11:00 )             30.8     PT/INR - ( 08 Oct 2023 11:00 )   PT: 10.9 sec;   INR: 0.95 ratio    PTT - ( 08 Oct 2023 11:00 )  PTT:27.6 sec      CAPILLARY BLOOD GLUCOSE  POCT Blood Glucose.: 109 mg/dL (09 Oct 2023 08:03)  POCT Blood Glucose.: 150 mg/dL (08 Oct 2023 21:58)  POCT Blood Glucose.: 203 mg/dL (08 Oct 2023 17:10)  POCT Blood Glucose.: 187 mg/dL (08 Oct 2023 12:04)          MEDICATIONS  (STANDING):  ammonium lactate 12% Lotion 1 Application(s) Topical every 12 hours  artificial  tears Solution 1 Drop(s) Both EYES every 12 hours  ascorbic acid 500 milliGRAM(s) Oral daily  atovaquone  Suspension 1500 milliGRAM(s) Oral daily  dextrose 5%. 1000 milliLiter(s) (50 mL/Hr) IV Continuous <Continuous>  dextrose 5%. 1000 milliLiter(s) (100 mL/Hr) IV Continuous <Continuous>  dextrose 50% Injectable 25 Gram(s) IV Push once  dextrose 50% Injectable 25 Gram(s) IV Push once  dextrose 50% Injectable 12.5 Gram(s) IV Push once  folic acid 1 milliGRAM(s) Oral daily  glucagon  Injectable 1 milliGRAM(s) IntraMuscular once  hemorrhoidal Ointment 1 Application(s) Rectal four times a day  influenza   Vaccine 0.5 milliLiter(s) IntraMuscular once  insulin lispro (ADMELOG) corrective regimen sliding scale   SubCutaneous Before meals and at bedtime  melatonin 6 milliGRAM(s) Oral at bedtime  methylPREDNISolone 64 milliGRAM(s) Oral daily  metoprolol tartrate 25 milliGRAM(s) Oral two times a day  multivitamin/minerals/iron Oral Solution (CENTRUM) 15 milliLiter(s) Oral daily  nystatin Powder 1 Application(s) Topical every 12 hours  pantoprazole    Tablet 40 milliGRAM(s) Oral before breakfast  sodium chloride 0.9% lock flush 5 milliLiter(s) IV Push two times a day  zinc oxide 40% Paste 1 Application(s) Topical two times a day  zinc sulfate 220 milliGRAM(s) Oral daily    MEDICATIONS  (PRN):  albuterol/ipratropium for Nebulization 3 milliLiter(s) Nebulizer every 6 hours PRN Shortness of Breath and/or Wheezing  aluminum hydroxide/magnesium hydroxide/simethicone Suspension 30 milliLiter(s) Oral every 4 hours PRN Dyspepsia  dextrose Oral Gel 15 Gram(s) Oral once PRN Blood Glucose LESS THAN 70 milliGRAM(s)/deciliter  simethicone 80 milliGRAM(s) Chew four times a day PRN Gas  sodium chloride 0.65% Nasal 1 Spray(s) Both Nostrils every 8 hours PRN Nasal Congestion CC: Non-specific Interstitial Lung Disease     Today's Subjective & Objective Findings:  Patient seen and examined at bedside this AM with Dr. López.  Patient partner present during encounter.   Reports bleeding from her hemorrhoids 2 days prior and interval loose BM, all resolved with two dozes of imodium  Her partner feels that multiple/variety of home food items taken by patient during her sister's visit during the week end, may have contributed to the recurrence of hemorrhoidal bleeding  Reports regular BM yesterday 10/8  Had mild epistaxis this AM post PT, which resolved afterwards  On ENT f/u for epistaxis but reports that she experienced nasal bleeding with afrin nasal drops, hence she is using the saline nasal drops with response    Patient uncomfortable with multiple needle sticks, in attempt at blood draw, has veins that are difficult to be accessed  She requested labs to be taken from her left arm med line  Cleared with hospitalist to go ahead with this    We discussed cardiology recs to switch lovenox with oral AC,but she wants us to clear with her private cardiologist before doing this, which we have promised to do  She report no distress, tolerating oral diet     Denies headache, dizziness, visual changes, chest pain, SOB/MANZANO, abdominal pain, nausea, vomiting, diarrhea, dysuria, numbness or tingling.    Therapy-- engaging, motivated  PT session today, worked on LE motor strength and ROM    Cardiology review appreciated  Vascular consult will be requested as rec by cardiology to f/u on Rt IJ persisting thrombus as noted on imaging     Discussed imaging reports and lab results todasy Hb >9 with patient     VITALS  T(C): 36.5 (10-08-23 @ 20:58), Max: 36.5 (10-08-23 @ 20:58)  HR: 68 (10-09-23 @ 08:00) (68 - 78)  BP: 112/78 (10-09-23 @ 08:00) (102/67 - 116/71)  RR: 16 (10-09-23 @ 08:00) (16 - 16)  SpO2: 97% (10-09-23 @ 08:00) (97% - 99%)      PHYSICAL EXAM:  Gen -  Comfortable, sitting on a WC, Oxy sats normal on NC oxy 2L  HEENT - NAd, nostrils are dry, no bleeding, mod icterus  Neck - Supple, No limited ROM, O2 via NC  Pulm - air entry improving   Cardiovascular - RRR, S1S2  Chest - good chest expansion, good respiratory effort  Abdomen - Soft, non tender, +BS, scattered ecchymosis on abd  Extremities - No Cyanosis, no clubbing, 2+ edema to b/l feet, non pitting, no calf tenderness, LUE Med line    Neuro-     Cognitive - awake, alert, oriented to person, place, date, year.  Able to follow command appropriately     Communication -speech tone and volume improved      Attention: Intact     Memory:  memory intact     Cranial Nerves - CN 2-12 intact.     Motor -                    LEFT    UE - 4/5                    RIGHT UE - 4/5                     LEFT    LE - 2 +-/5 limited by leg edema                    RIGHT LE - 2+-/5  limitted by leg edema      Sensory - Intact        Reflexes - DTR 1+      Coordination - FTN  impaired  due to weakness   MSK: soreness and weakness  Psychiatric - Mood stable, Affect WNL  Skin: Left lower abdomen ruptured blister 3.0 x 1.0, stage 2 pressure injury to right buttock 4 x1 and left buttock 3x1, stage 2 pressure injury ti right inner thigh 0.2 x 0.2, right groin wound 10.5 x 2.0, Left groin wound 5 x 5,  right neck scab wound    LABS:                        9.6    13.92 )-----------( 209      ( 08 Oct 2023 11:00 )             30.8     PT/INR - ( 08 Oct 2023 11:00 )   PT: 10.9 sec;   INR: 0.95 ratio    PTT - ( 08 Oct 2023 11:00 )  PTT:27.6 sec    Cr 10/6--Cr 0.46    CAPILLARY BLOOD GLUCOSE  POCT Blood Glucose.: 109 mg/dL (09 Oct 2023 08:03)  POCT Blood Glucose.: 150 mg/dL (08 Oct 2023 21:58)  POCT Blood Glucose.: 203 mg/dL (08 Oct 2023 17:10)  POCT Blood Glucose.: 187 mg/dL (08 Oct 2023 12:04)          MEDICATIONS  (STANDING):  ammonium lactate 12% Lotion 1 Application(s) Topical every 12 hours  artificial  tears Solution 1 Drop(s) Both EYES every 12 hours  ascorbic acid 500 milliGRAM(s) Oral daily  atovaquone  Suspension 1500 milliGRAM(s) Oral daily  dextrose 5%. 1000 milliLiter(s) (50 mL/Hr) IV Continuous <Continuous>  dextrose 5%. 1000 milliLiter(s) (100 mL/Hr) IV Continuous <Continuous>  dextrose 50% Injectable 25 Gram(s) IV Push once  dextrose 50% Injectable 25 Gram(s) IV Push once  dextrose 50% Injectable 12.5 Gram(s) IV Push once  folic acid 1 milliGRAM(s) Oral daily  glucagon  Injectable 1 milliGRAM(s) IntraMuscular once  hemorrhoidal Ointment 1 Application(s) Rectal four times a day  influenza   Vaccine 0.5 milliLiter(s) IntraMuscular once  insulin lispro (ADMELOG) corrective regimen sliding scale   SubCutaneous Before meals and at bedtime  melatonin 6 milliGRAM(s) Oral at bedtime  methylPREDNISolone 64 milliGRAM(s) Oral daily  metoprolol tartrate 25 milliGRAM(s) Oral two times a day  multivitamin/minerals/iron Oral Solution (CENTRUM) 15 milliLiter(s) Oral daily  nystatin Powder 1 Application(s) Topical every 12 hours  pantoprazole    Tablet 40 milliGRAM(s) Oral before breakfast  sodium chloride 0.9% lock flush 5 milliLiter(s) IV Push two times a day  zinc oxide 40% Paste 1 Application(s) Topical two times a day  zinc sulfate 220 milliGRAM(s) Oral daily    MEDICATIONS  (PRN):  albuterol/ipratropium for Nebulization 3 milliLiter(s) Nebulizer every 6 hours PRN Shortness of Breath and/or Wheezing  aluminum hydroxide/magnesium hydroxide/simethicone Suspension 30 milliLiter(s) Oral every 4 hours PRN Dyspepsia  dextrose Oral Gel 15 Gram(s) Oral once PRN Blood Glucose LESS THAN 70 milliGRAM(s)/deciliter  simethicone 80 milliGRAM(s) Chew four times a day PRN Gas  sodium chloride 0.65% Nasal 1 Spray(s) Both Nostrils every 8 hours PRN Nasal Congestion

## 2023-10-09 NOTE — PROGRESS NOTE ADULT - ASSESSMENT
Physical Examination:  GENERAL:                Alert, Oriented, No acute distress.    HEENT:                    Pupils equal, reactive to light.  Symmetric. No JVD, Moist MM  PULM:                     Bilateral air entry,  poor air entry at bases No Rales, No Rhonchi, No Wheezing  CVS:                         S1, S2,  No Murmur  ABD:                        Soft, nondistended, nontender, normoactive bowel sounds,   EXT:                        Trace non pitting edema, nontender, No Cyanosis or Clubbing   Vascular:                 Warm Extremities, Normal Capillary refill, Normal Distal Pulses  NEURO:                  Alert, oriented, interactive, bilateral upper and lower extremity weakness   PSYC:                      Calm, + Insight.      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 S/p pulse steroids, PLEX (9/15, 9/18, 9/20) and Rituximab (9/16 & 10/3). Course c/b severe leukopenia s/p filgastrim, shock liver with slow to resolved hyperbilirubinemia, RIJ DVT, oropharyngeal dysphagia, and recurrent SVT. Repeat CT chest on 9/30 with markedly improved bilateral groundglass opacities with resolved lung consolidations. Now in acute rehab on 4 lpm NC oxygen and tapering dose of medrol.     Problem List:  1. Interstitial lung disease   2. Mixed connective tissue disease   3. Acute hypoxia  4. RIJ DVT     Plan:  - Stable respiratory status, on NC 2 LPM at rest, can increase to 5-6 LPM with activity. Goal SpO2>92%  - Cont. Methylprednisolone PO, taper as per rheumatology recs. from [facility]   - Continue atovaquone for PCP prophylaxis  - S/p Rituximab 9/16 and 10/3  - S/p PLEX during MICU stay  - Repeat CT scan in 6 weeks  - No evidence of active infection, monitor off antibiotics  - Cont. anticoagulation for DVT associated with ECMO cannula  - Consider incentive spirometry   - Care per primary team  - Discussed with spouse at bedside   - PT OOB as tolerated  - Outpatient pulmonary and rheumatology follow up upon discharge

## 2023-10-09 NOTE — CHART NOTE - NSCHARTNOTEFT_GEN_A_CORE
Nutrition Follow Up Note  Source: Medical Record [X] Patient [X] Family [ ] Pt's partner [X]         Diet: Minced and moist, Consistent carbohydrate (no snacks), Darinel BID, Glucerna daily  Pt tolerating diet with fair PO intake, eating about 2/3 of meal trays per pt and pt's partner. Discussed adequate protein intake due to increased protein needs. Encouraged consumption of oral nutrition supplement + reviewed nutrition ed of current diet. Pt was having diarrhea but it has resolved. No digestive complaints at this time.    Enteral/Parenteral Nutrition: N/A    Current Weight: 256.6 lbs (10-5)    Pertinent Medications: MEDICATIONS  (STANDING):  ammonium lactate 12% Lotion 1 Application(s) Topical every 12 hours  artificial  tears Solution 1 Drop(s) Both EYES every 12 hours  ascorbic acid 500 milliGRAM(s) Oral daily  atovaquone  Suspension 1500 milliGRAM(s) Oral daily  dextrose 5%. 1000 milliLiter(s) (100 mL/Hr) IV Continuous <Continuous>  dextrose 5%. 1000 milliLiter(s) (50 mL/Hr) IV Continuous <Continuous>  dextrose 50% Injectable 12.5 Gram(s) IV Push once  dextrose 50% Injectable 25 Gram(s) IV Push once  dextrose 50% Injectable 25 Gram(s) IV Push once  folic acid 1 milliGRAM(s) Oral daily  glucagon  Injectable 1 milliGRAM(s) IntraMuscular once  hemorrhoidal Ointment 1 Application(s) Rectal four times a day  influenza   Vaccine 0.5 milliLiter(s) IntraMuscular once  insulin lispro (ADMELOG) corrective regimen sliding scale   SubCutaneous Before meals and at bedtime  melatonin 6 milliGRAM(s) Oral at bedtime  methylPREDNISolone 64 milliGRAM(s) Oral daily  metoprolol tartrate 25 milliGRAM(s) Oral two times a day  multivitamin/minerals/iron Oral Solution (CENTRUM) 15 milliLiter(s) Oral daily  nystatin Powder 1 Application(s) Topical every 12 hours  pantoprazole    Tablet 40 milliGRAM(s) Oral before breakfast  sodium chloride 0.9% lock flush 5 milliLiter(s) IV Push two times a day  zinc oxide 40% Paste 1 Application(s) Topical two times a day  zinc sulfate 220 milliGRAM(s) Oral daily    MEDICATIONS  (PRN):  albuterol/ipratropium for Nebulization 3 milliLiter(s) Nebulizer every 6 hours PRN Shortness of Breath and/or Wheezing  aluminum hydroxide/magnesium hydroxide/simethicone Suspension 30 milliLiter(s) Oral every 4 hours PRN Dyspepsia  dextrose Oral Gel 15 Gram(s) Oral once PRN Blood Glucose LESS THAN 70 milliGRAM(s)/deciliter  simethicone 80 milliGRAM(s) Chew four times a day PRN Gas  sodium chloride 0.65% Nasal 1 Spray(s) Both Nostrils every 8 hours PRN Nasal Congestion      Pertinent Labs:   10-05 Phos 2.9 mg/dL 10-06 Alb 2.2 g/dL<L> 09-25 Chol --    LDL --    HDL --    Trig 199 mg/dL<H>        Skin: R inner thigh stage II, R inner buttock stage II, L inner buttock stage II Per nursing flowsheets     Edema: 3+ R/L legs Per nursing flowsheets     Last BM: on 10/9 Per nursing flowsheets     Estimated Needs:   [X] No Change since Previous Assessment  [ ] Recalculated:     Previous Nutrition Diagnosis:   1. Severe malnutrition  2. Increased nutrient needs    Nutrition Diagnosis is [X] Ongoing  - addressed with Glucerna daily (provides 220 kcal, 10 g protein/serving), Darinel BID (provides 100 kcal, 14 g amino acids/serving), MVI, vitamin C, Zinc    New Nutrition Diagnosis: [X] Not Applicable  [ ] Inadequate Protein Energy Intake   [ ] Inadequate Oral Intake   [ ] Excessive Energy Intake   [ ] Increased Nutrient Needs   [ ] Obesity   [ ] Altered GI Function   [ ] Unintended Weight Loss   [ ] Food & Nutrition Related Knowledge Deficit  [ ] Limited Adherence to nutrition related recommendations   [ ] Malnutrition      Interventions:   1. Recommend continuing with current plan of care  2. Encourage PO intake  3. Ongoing diet education    Monitoring & Evaluation:   [X] Weights   [X] PO Intake   [X] Follow Up (Per Protocol)  [X] Tolerance to Diet Prescription   [X] Other: Labs     RD Remains Available.  Desire Livingston RD

## 2023-10-10 LAB
ALBUMIN SERPL ELPH-MCNC: 2.4 G/DL — LOW (ref 3.3–5)
ALP SERPL-CCNC: 540 U/L — HIGH (ref 40–120)
ALT FLD-CCNC: 232 U/L — HIGH (ref 10–45)
ANION GAP SERPL CALC-SCNC: 8 MMOL/L — SIGNIFICANT CHANGE UP (ref 5–17)
AST SERPL-CCNC: 139 U/L — HIGH (ref 10–40)
BILIRUB SERPL-MCNC: 7.5 MG/DL — HIGH (ref 0.2–1.2)
BUN SERPL-MCNC: 20 MG/DL — SIGNIFICANT CHANGE UP (ref 7–23)
CALCIUM SERPL-MCNC: 9.3 MG/DL — SIGNIFICANT CHANGE UP (ref 8.4–10.5)
CHLORIDE SERPL-SCNC: 98 MMOL/L — SIGNIFICANT CHANGE UP (ref 96–108)
CO2 SERPL-SCNC: 27 MMOL/L — SIGNIFICANT CHANGE UP (ref 22–31)
CREAT SERPL-MCNC: 0.7 MG/DL — SIGNIFICANT CHANGE UP (ref 0.5–1.3)
EGFR: 97 ML/MIN/1.73M2 — SIGNIFICANT CHANGE UP
GLUCOSE BLDC GLUCOMTR-MCNC: 114 MG/DL — HIGH (ref 70–99)
GLUCOSE BLDC GLUCOMTR-MCNC: 151 MG/DL — HIGH (ref 70–99)
GLUCOSE BLDC GLUCOMTR-MCNC: 151 MG/DL — HIGH (ref 70–99)
GLUCOSE BLDC GLUCOMTR-MCNC: 53 MG/DL — CRITICAL LOW (ref 70–99)
GLUCOSE BLDC GLUCOMTR-MCNC: 65 MG/DL — LOW (ref 70–99)
GLUCOSE BLDC GLUCOMTR-MCNC: 67 MG/DL — LOW (ref 70–99)
GLUCOSE BLDC GLUCOMTR-MCNC: 68 MG/DL — LOW (ref 70–99)
GLUCOSE BLDC GLUCOMTR-MCNC: 80 MG/DL — SIGNIFICANT CHANGE UP (ref 70–99)
GLUCOSE SERPL-MCNC: 240 MG/DL — HIGH (ref 70–99)
POTASSIUM SERPL-MCNC: 4.4 MMOL/L — SIGNIFICANT CHANGE UP (ref 3.5–5.3)
POTASSIUM SERPL-SCNC: 4.4 MMOL/L — SIGNIFICANT CHANGE UP (ref 3.5–5.3)
PROT SERPL-MCNC: 5.4 G/DL — LOW (ref 6–8.3)
SODIUM SERPL-SCNC: 133 MMOL/L — LOW (ref 135–145)

## 2023-10-10 PROCEDURE — 99232 SBSQ HOSP IP/OBS MODERATE 35: CPT

## 2023-10-10 RX ORDER — SENNA PLUS 8.6 MG/1
2 TABLET ORAL AT BEDTIME
Refills: 0 | Status: DISCONTINUED | OUTPATIENT
Start: 2023-10-10 | End: 2023-10-11

## 2023-10-10 RX ORDER — SODIUM CHLORIDE 9 MG/ML
1000 INJECTION, SOLUTION INTRAVENOUS
Refills: 0 | Status: DISCONTINUED | OUTPATIENT
Start: 2023-10-10 | End: 2023-10-13

## 2023-10-10 RX ORDER — NYSTATIN CREAM 100000 [USP'U]/G
1 CREAM TOPICAL
Refills: 0 | Status: DISCONTINUED | OUTPATIENT
Start: 2023-10-10 | End: 2023-12-20

## 2023-10-10 RX ORDER — ZINC OXIDE 200 MG/G
1 OINTMENT TOPICAL THREE TIMES A DAY
Refills: 0 | Status: DISCONTINUED | OUTPATIENT
Start: 2023-10-10 | End: 2023-12-20

## 2023-10-10 RX ADMIN — Medication 25 MILLIGRAM(S): at 18:02

## 2023-10-10 RX ADMIN — ZINC OXIDE 1 APPLICATION(S): 200 OINTMENT TOPICAL at 14:39

## 2023-10-10 RX ADMIN — Medication 6 MILLIGRAM(S): at 20:44

## 2023-10-10 RX ADMIN — Medication 64 MILLIGRAM(S): at 06:37

## 2023-10-10 RX ADMIN — ATOVAQUONE 1500 MILLIGRAM(S): 750 SUSPENSION ORAL at 12:28

## 2023-10-10 RX ADMIN — NYSTATIN CREAM 1 APPLICATION(S): 100000 CREAM TOPICAL at 18:02

## 2023-10-10 RX ADMIN — ZINC OXIDE 1 APPLICATION(S): 200 OINTMENT TOPICAL at 07:07

## 2023-10-10 RX ADMIN — Medication 1 DROP(S): at 06:32

## 2023-10-10 RX ADMIN — SODIUM CHLORIDE 5 MILLILITER(S): 9 INJECTION INTRAMUSCULAR; INTRAVENOUS; SUBCUTANEOUS at 06:43

## 2023-10-10 RX ADMIN — ZINC OXIDE 1 APPLICATION(S): 200 OINTMENT TOPICAL at 20:45

## 2023-10-10 RX ADMIN — PANTOPRAZOLE SODIUM 40 MILLIGRAM(S): 20 TABLET, DELAYED RELEASE ORAL at 06:34

## 2023-10-10 RX ADMIN — ENOXAPARIN SODIUM 70 MILLIGRAM(S): 100 INJECTION SUBCUTANEOUS at 07:02

## 2023-10-10 RX ADMIN — ZINC SULFATE TAB 220 MG (50 MG ZINC EQUIVALENT) 220 MILLIGRAM(S): 220 (50 ZN) TAB at 12:29

## 2023-10-10 RX ADMIN — Medication 25 MILLIGRAM(S): at 07:50

## 2023-10-10 RX ADMIN — SODIUM CHLORIDE 5 MILLILITER(S): 9 INJECTION INTRAMUSCULAR; INTRAVENOUS; SUBCUTANEOUS at 18:06

## 2023-10-10 RX ADMIN — Medication 500 MILLIGRAM(S): at 12:28

## 2023-10-10 RX ADMIN — Medication 15 MILLILITER(S): at 12:29

## 2023-10-10 RX ADMIN — SIMETHICONE 80 MILLIGRAM(S): 80 TABLET, CHEWABLE ORAL at 06:47

## 2023-10-10 RX ADMIN — ENOXAPARIN SODIUM 70 MILLIGRAM(S): 100 INJECTION SUBCUTANEOUS at 18:02

## 2023-10-10 RX ADMIN — Medication 1 DROP(S): at 18:02

## 2023-10-10 RX ADMIN — NYSTATIN CREAM 1 APPLICATION(S): 100000 CREAM TOPICAL at 06:58

## 2023-10-10 RX ADMIN — Medication 1 APPLICATION(S): at 06:35

## 2023-10-10 RX ADMIN — PHENYLEPHRINE-SHARK LIVER OIL-MINERAL OIL-PETROLATUM RECTAL OINTMENT 1 APPLICATION(S): at 18:03

## 2023-10-10 RX ADMIN — PHENYLEPHRINE-SHARK LIVER OIL-MINERAL OIL-PETROLATUM RECTAL OINTMENT 1 APPLICATION(S): at 12:29

## 2023-10-10 RX ADMIN — Medication 1 MILLIGRAM(S): at 12:29

## 2023-10-10 NOTE — PROVIDER CONTACT NOTE (HYPOGLYCEMIA EVENT) - NS PROVIDER CONTACT NOTE-TREATMENT-HYPO
4 oz apple juice, given on 10/10 7:45am. Repeat fingerstick done at 8:04 am 4oz apple juice given again at 8:05 am. Patient refusing 30 minute fingerstick but MD convinced patient. Repeat fingerstick done at 8:43 am and given a light snack./4 oz Fruit Juice (Specify quantity, date/time) 4 oz apple juice, given on 10/10 7:45am. Repeat fingerstick done at 8:04 am 4oz apple juice given again at 8:05 am. Patient refusing 30 minute fingerstick but MD convinced patient. Repeat fingerstick done at 8:43 am and given a light snack. Repeat fingerstick done at 9:17 am. Blood sugar 114/4 oz Fruit Juice (Specify quantity, date/time)

## 2023-10-10 NOTE — PROGRESS NOTE ADULT - SUBJECTIVE AND OBJECTIVE BOX
Time of Visit:  Patient seen and examined. pat is lying in bed comfortable , being fed by partner     MEDICATIONS  (STANDING):  artificial  tears Solution 1 Drop(s) Both EYES every 12 hours  ascorbic acid 500 milliGRAM(s) Oral daily  atovaquone  Suspension 1500 milliGRAM(s) Oral daily  dextrose 5%. 1000 milliLiter(s) (50 mL/Hr) IV Continuous <Continuous>  dextrose 5%. 1000 milliLiter(s) (50 mL/Hr) IV Continuous <Continuous>  dextrose 5%. 1000 milliLiter(s) (100 mL/Hr) IV Continuous <Continuous>  dextrose 50% Injectable 25 Gram(s) IV Push once  dextrose 50% Injectable 25 Gram(s) IV Push once  dextrose 50% Injectable 12.5 Gram(s) IV Push once  enoxaparin Injectable 70 milliGRAM(s) SubCutaneous every 12 hours  folic acid 1 milliGRAM(s) Oral daily  glucagon  Injectable 1 milliGRAM(s) IntraMuscular once  hemorrhoidal Ointment 1 Application(s) Rectal four times a day  influenza   Vaccine 0.5 milliLiter(s) IntraMuscular once  melatonin 6 milliGRAM(s) Oral at bedtime  methylPREDNISolone 64 milliGRAM(s) Oral daily  metoprolol tartrate 25 milliGRAM(s) Oral two times a day  multivitamin/minerals/iron Oral Solution (CENTRUM) 15 milliLiter(s) Oral daily  nystatin Powder 1 Application(s) Topical two times a day  pantoprazole    Tablet 40 milliGRAM(s) Oral before breakfast  senna 2 Tablet(s) Oral at bedtime  sodium chloride 0.9% lock flush 5 milliLiter(s) IV Push two times a day  zinc oxide 40% Paste 1 Application(s) Topical three times a day  zinc sulfate 220 milliGRAM(s) Oral daily      MEDICATIONS  (PRN):  albuterol/ipratropium for Nebulization 3 milliLiter(s) Nebulizer every 6 hours PRN Shortness of Breath and/or Wheezing  aluminum hydroxide/magnesium hydroxide/simethicone Suspension 30 milliLiter(s) Oral every 4 hours PRN Dyspepsia  dextrose Oral Gel 15 Gram(s) Oral once PRN Blood Glucose LESS THAN 70 milliGRAM(s)/deciliter  simethicone 80 milliGRAM(s) Chew four times a day PRN Gas  sodium chloride 0.65% Nasal 1 Spray(s) Both Nostrils every 8 hours PRN Nasal Congestion       Medications up to date at time of exam.      PHYSICAL EXAMINATION:  Patient has no new complaints.  GENERAL: The patient is a well-developed, well-nourished, in no apparent distress.     Vital Signs Last 24 Hrs  T(C): 36.3 (10 Oct 2023 07:40), Max: 36.4 (09 Oct 2023 21:30)  T(F): 97.4 (10 Oct 2023 07:40), Max: 97.5 (09 Oct 2023 21:30)  HR: 72 (10 Oct 2023 12:26) (68 - 72)  BP: 103/68 (10 Oct 2023 12:26) (99/62 - 114/70)  BP(mean): --  RR: 16 (10 Oct 2023 12:26) (16 - 16)  SpO2: 96% (10 Oct 2023 12:26) (96% - 98%)    Parameters below as of 10 Oct 2023 12:26  Patient On (Oxygen Delivery Method): nasal cannula  O2 Flow (L/min): 3     (if applicable)    Chest Tube (if applicable)    HEENT: Head is normocephalic and atraumatic. Extraocular muscles are intact. Mucous membranes are moist.     NECK: Supple, no palpable adenopathy.    LUNGS: Clear to auscultation, no wheezing, rales, or rhonchi.    HEART: Regular rate and rhythm without murmur.    ABDOMEN: Soft, nontender, and nondistended.  No hepatosplenomegaly is noted.    : No painful voiding, no pelvic pain    EXTREMITIES: Without any cyanosis, clubbing, rash, lesions or edema.    NEUROLOGIC: Awake, alert,     SKIN: Warm, dry, good turgor.      LABS:    10-10    133<L>  |  98  |  20  ----------------------------<  240<H>  4.4   |  27  |  0.70    Ca    9.3      10 Oct 2023 11:00    TPro  5.4<L>  /  Alb  2.4<L>  /  TBili  7.5<H>  /  DBili  x   /  AST  139<H>  /  ALT  232<H>  /  AlkPhos  540<H>  10-10      Urinalysis Basic - ( 10 Oct 2023 11:00 )    Color: x / Appearance: x / SG: x / pH: x  Gluc: 240 mg/dL / Ketone: x  / Bili: x / Urobili: x   Blood: x / Protein: x / Nitrite: x   Leuk Esterase: x / RBC: x / WBC x   Sq Epi: x / Non Sq Epi: x / Bacteria: x        MICROBIOLOGY: (if applicable)    RADIOLOGY & ADDITIONAL STUDIES:  EKG:   CXR:  ECHO:    IMPRESSION: 62y Female PAST MEDICAL & SURGICAL HISTORY:  Afib      Sciatica       p/w         IMP:   62 yr old woman with CAD,  A-Fib with 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 with acute hypoxemic respiratory failure that initially responded to steroids, then with worsening after taper with development of acute hypoxemic and hypercapnic respiratory failure requiring intubation and VV- ECMO on 9/13, then transferred to [facility], concerning for DAH vs ILD flare, decannulated from VV-ECMO 9/21, extubated 9/22. S/p pulse steroids, PLEX (9/15, 9/18, 9/20) and Rituximab (9/16 & 10/3). Course c/b severe leukopenia s/p filgastrim, shock liver with slow to resolved hyperbilirubinemia, RIJ DVT, oropharyngeal dysphagia, and recurrent SVT. Repeat CT chest on 9/30 with markedly improved bilateral groundglass opacities with resolved lung consolidations. Now in acute rehab on 4 lpm NC oxygen and tapering dose of medrol.     Problem List:  1. Interstitial lung disease   2. Mixed connective tissue disease   3. Acute hypoxia  4. RIJ DVT     Plan:  - Stable respiratory status, on NC 2 LPM at rest, can increase to 5-6 LPM with activity. Goal SpO2>92%  - Cont. Methylprednisolone PO, taper as per rheumatology recs. from [facility]   - Continue atovaquone for PCP prophylaxis  - S/p Rituximab 9/16 and 10/3  - S/p PLEX during MICU stay      spoke with partner Kiara at bedside   - Repeat CT scan in 6 weeks  - No evidence of active infection, monitor off antibiotics  - Cont. anticoagulation for DVT associated with ECMO cannula  - Consider incentive spirometry   - Care per primary team  - Discussed with spouse at bedside   - PT OOB as tolerated  - Outpatient pulmonary and rheumatology follow up upon discharge

## 2023-10-10 NOTE — PROVIDER CONTACT NOTE (HYPOGLYCEMIA EVENT) - NS PROVIDER CONTACT BACKGROUND-HYPO
Age: 62y    Gender: Female    POCT Blood Glucose:  80 mg/dL (10-10-23 @ 08:43)  65 mg/dL (10-10-23 @ 08:04)  53 mg/dL (10-10-23 @ 08:01)  68 mg/dL (10-10-23 @ 07:45)  67 mg/dL (10-10-23 @ 07:43)  154 mg/dL (10-09-23 @ 21:58)  189 mg/dL (10-09-23 @ 16:53)  194 mg/dL (10-09-23 @ 11:27)      eMAR:  insulin lispro (ADMELOG) corrective regimen sliding scale   2 Unit(s) SubCutaneous (10-09-23 @ 22:00)   2 Unit(s) SubCutaneous (10-09-23 @ 16:54)   2 Unit(s) SubCutaneous (10-09-23 @ 11:29)    methylPREDNISolone   64 milliGRAM(s) Oral (10-10-23 @ 06:37)

## 2023-10-10 NOTE — PROGRESS NOTE ADULT - ASSESSMENT
Assessment/Plan:  ALIZA FOLEY is a 62 year old female with PMH of pAFIB and sciatica; who presented to [facility] ED with SOB, and was found to have groundglass opacities and interstitial lung disease. She was treated with empiric Vancomycin and Zosyn. She underwent a bronchoscopy with bronchoalveolar lavage and pulse steroids. She was given IV diuretics for increased pulmonary congestion, but her respiratory status continued to worsen.  On 9/13, she was transferred to [facility] for ECMO requirements. She was further treated with Plasmapheresis, Rituximab and high-dose steroids, with significant improvement. Her overall hospital course was complicated by thrombocytopenia (s/p several platelet transfusions), leukocytosis (infectious workup entirely negative- s/p Vanco and Ceftriaxone), AFIB with RVR (resolved with Metoprolol), dysphagia (requiring NGT), transaminitis (secondary to acute illness and hypotension),  non-occlusive RIJ DVT (started on Lovenox). Patient now admitted for a multidisciplinary rehab program. 10-05-23 @ 13:18    * Vascular consult for RIJ non-occlusive thrombus   * Wound care--Multiple wounds--perineal and buttock/sacral   * Rehab team to liason with outpatient Cardiologist regarding Eliquis   * Ace wrap to both legs for edema managment  * Labs--stable anemia, Bld sugar normal, on tapering steroid dose  * Blood draws from Left arm Medline   *Recommence lovenox    #Interstitial Lung Disease  - Gait Instability, ADL impairments and Functional impairments: start Comprehensive Rehab Program of PT/OT/SLP - 3 hours a day, 5 days a week  - P&O as needed   - Non-specific Interstitial Lung Disease  - Requiring ECMO   - Improvement s/p Plasmapheresis, Rituximab and high- dose steroids   - Albuterol/Ipratropium Nebulizer every 6 hours  - Mepron 1500mg daily  - PO Medrol ( due to liver dysfunction): Plan will be to taper by ~20% every 2 weeks  Medrol 64mg x 2 weeks  56mg x 2 weeks  44mg x 2 weeks   36mg x 2 weeks - Follow up Outpatient   -- Repeat CT Chest in 6 weeks   - Pulmonary following     #pAFIB/Rt Ij thrombus  - Metoprolol 25mg BID and lovenox  -- Cardiology consult--recm can switch to oral AC  --Await discussion with patient's cardiologist before switch to oral AC as requested by patient   (recent GI bleed, from hemorrhoid, resolved)        # Lymphedema both legs -chronic and Rt IJ thrombus  - BL LE doppler negative for DVT  - BL UE doppler with chronic thrombotic changes in RIJ   - Vascular consult     #Transaminitis   - Secondary to acute illness and hypotension at [facility]  - Trend LFTs  - Outpatient follow up     #Sleep  - Melatonin 6mg at HS    #Skin  - Skin: Left lower abdomen ruptured blister 3.0 x 1.0, stage 2 pressure injury to right buttock 4 x1 and left buttock 3x1, stage 2 pressure injury ti right inner thigh 0.2 x 0.2, right groin wound 10.5 x 2.0, Left groin wound 5 x 5,  right neck scab wound  -- MAD to skin folds- Nystatin to submammary and abdominal pannus BID  -- MAD to L groin- cleanse with NS, Triad cream daily  -- Mad to R groin- Nystatin powder daily   -- R groin wound-- aquacel packing, Triad to periwound, Allevyn foam- daily and PRN   - Pressure injury/Skin: OOB to Chair, PT/OT   - Offload pressure, low air loss support surfaces, T&P every 2 hours   --Wound care consult--Multiple wounds--perineal and buttock/sacral      #Pain Mgmt   - Tylenol PRN     #GI/Bowel Mgmt   - Pantoprazole  - Senna   - PRN: Maalox     #/Bladder Mgmt --voiding     #FEN   #Dysphagia  - Diet - Minced & Moist  [CC]    - Dysphaiga- SLP evaluation     #Health maintenance  - Folic Acid   - MVI  - Ocean nasal spray    # Difficulty with access to peripheral veins--  * Blood draws from Left arm Medline     #Precautions / PROPHYLAXIS:   - Falls  - ortho: Weight bearing status: WBAT   - Lungs: Aspiration, Incentive Spirometer   - DVT: Lovenox 70mg BID--switch to oals after clearing with patient's cardiologist   - RI DVT found at ECMO cannula   ---------------------------    * Labs--stable anemia, Bld sugar normal, on tapering steroid dose    Liaison with family  Patient's partner present during review, details of review d/w her, detailed explanation of therapy protocol explained and she was happy with same  10/9--Partner present during review and discussed details treatment plan with her    Liaison with providers  Consult recs form multiple specialities being implemented  Pending vascular consult     OUTPATIENT/FOLLOW UP:    Trae Whitney  Pulmonary Disease  [address], [address] [place] [address]  Phone: [phone]  Fax: [phone]  Follow Up Time: 2 weeks    Ryanne Allen  Rheumatology  [address] [address]  Phone: [phone]  Fax: [phone]  Follow Up Time: 1 week    Kyle Pierce  Internal Medicine  [address] [place] [address], [address]  [place] [address]  Phone: [phone]  Fax: [phone]  Follow Up Time: 2 weeks    Addy Powers  Pulmonary Disease  [address] [facility], [facility] [address]  [place], [place] [address]  Phone: [phone]  Fax: [phone]  Follow Up Time:     Kwame Hawley  Critical Care Medicine  [address] [facility], [facility] [address]  [place] [address]  Phone: [phone]  Fax: [phone]  Follow Up Time:     Neena Borrego  Rheumatology  [address] [facility], Floor 3  [place] [address]  Phone: [phone]  Fax: [phone]  Follow Up Time:    [facility]  [facility] [address]  Scheduled Appointment: 09/13/2023  2:40 PM  --------------------------- Assessment/Plan:  ALIZA FOLEY is a 62 year old female with PMH of pAFIB and sciatica; who presented to [facility] ED with SOB, and was found to have groundglass opacities and interstitial lung disease. She was treated with empiric Vancomycin and Zosyn. She underwent a bronchoscopy with bronchoalveolar lavage and pulse steroids. She was given IV diuretics for increased pulmonary congestion, but her respiratory status continued to worsen.  On 9/13, she was transferred to [facility] for ECMO requirements. She was further treated with Plasmapheresis, Rituximab and high-dose steroids, with significant improvement. Her overall hospital course was complicated by thrombocytopenia (s/p several platelet transfusions), leukocytosis (infectious workup entirely negative- s/p Vanco and Ceftriaxone), AFIB with RVR (resolved with Metoprolol), dysphagia (requiring NGT), transaminitis (secondary to acute illness and hypotension),  non-occlusive RIJ DVT (started on Lovenox). Patient now admitted for a multidisciplinary rehab program. 10-05-23 @ 13:18    * Surg consult/recs apprecaited-- RIJ non-occlusive thrombus --agreed with AC, await discussion with patient's private cardiologist before commencing  * Wound care review and recs apprecaited--Multiple wounds--perineal and buttock/sacral   * Ace wrap to both legs for edema managment  * Blood draws from Left arm Medline   * CMP today (last one was 10/6)  * Interval hypoglycemia--d/c slidding scale insulin   * Monitor BM--Bowel agents on hold due to recent loose BM    #Interstitial Lung Disease  - Gait Instability, ADL impairments and Functional impairments: start Comprehensive Rehab Program of PT/OT/SLP - 3 hours a day, 5 days a week  - P&O as needed   - Non-specific Interstitial Lung Disease  - Requiring ECMO   - Improvement s/p Plasmapheresis, Rituximab and high- dose steroids   - Albuterol/Ipratropium Nebulizer every 6 hours  - Mepron 1500mg daily  - PO Medrol ( due to liver dysfunction): Plan will be to taper by ~20% every 2 weeks  Medrol 64mg x 2 weeks  56mg x 2 weeks  44mg x 2 weeks   36mg x 2 weeks - Follow up Outpatient   -- Repeat CT Chest in 6 weeks   - Pulmonary following     #pAFIB/Rt Ij thrombus  - Metoprolol 25mg BID and lovenox  -- Cardiology consult--recm can switch to oral AC  --Await discussion with patient's cardiologist before switch to oral AC as requested by patient   (recent GI bleed, from hemorrhoid, resolved)      # Lymphedema both legs -chronic and Rt IJ thrombus  - BL LE doppler negative for DVT  - BL UE doppler with chronic thrombotic changes in RIJ   - Surg consult recs--commence anticoagulation as recs by cardiology    #Transaminitis   - Secondary to acute illness and hypotension at [facility]  - Trend LFTs  - Outpatient follow up     #Sleep  - Melatonin 6mg at HS    #Skin  - Skin: Left lower abdomen ruptured blister 3.0 x 1.0, stage 2 pressure injury to right buttock 4 x1 and left buttock 3x1, stage 2 pressure injury ti right inner thigh 0.2 x 0.2, right groin wound 10.5 x 2.0, Left groin wound 5 x 5,  right neck scab wound  -- MAD to skin folds- Nystatin to submammary and abdominal pannus BID  -- MAD to L groin- cleanse with NS, Triad cream daily  -- Mad to R groin- Nystatin powder daily   -- R groin wound-- aquacel packing, Triad to periwound, Allevyn foam- daily and PRN   - Pressure injury/Skin: OOB to Chair, PT/OT   - Offload pressure, low air loss support surfaces, T&P every 2 hours   --Wound care consult-10/9 -Multiple wounds--perineal and buttock/sacral, recs appreciated      #Pain Mgmt   - Tylenol PRN     #GI/Bowel Mgmt   - Pantoprazole  - Senna  --on hold due to recent Loose BM  - PRN: Maalox     #/Bladder Mgmt --voiding     #FEN   #Dysphagia  - Diet - Minced & Moist  [CC]    - Dysphaiga- SLP evaluation     #Health maintenance  - Folic Acid   - MVI  - Ocean nasal spray    # Difficulty with access to peripheral veins--  * Blood draws from Left arm Medline     #Precautions / PROPHYLAXIS:   - Falls  - ortho: Weight bearing status: WBAT   - Lungs: Aspiration, Incentive Spirometer   - DVT: Lovenox 70mg BID--switch to oals after clearing with patient's cardiologist   - RIJ DVT found at ECMO cannula   ---------------------------    * Labs--stable anemia, Bld sugar normal, on tapering steroid dose  CMP today 10/10    Liaison with family  Patient's partner present during review, details of review d/w her, detailed explanation of therapy protocol explained and she was happy with same  10/9--Partner present during review and discussed details treatment plan with her    Liaison with providers  Consult recs form multiple specialities being implemented  Pending vascular consult     OUTPATIENT/FOLLOW UP:    Trae Whitney  Pulmonary Disease  [address], [address] [place] [address]  Phone: [phone]  Fax: [phone]  Follow Up Time: 2 weeks    Ryanne Allen  Rheumatology  [address] [place] [address] [address]  Phone: [phone]  Fax: [phone]  Follow Up Time: 1 week    Kyle Pierce  Internal Medicine  [address] [place] [address], [address]  [place] [address]  Phone: [phone]  Fax: [phone]  Follow Up Time: 2 weeks    Addy Powers  Pulmonary Disease  [address] [facility], [address]  [place], [place] [address]  Phone: [phone]  Fax: [phone]  Follow Up Time:     Kwame Hawley  Critical Care Medicine  [address] [facility], [facility] [address]  [place] [address]  Phone: [phone]  Fax: [phone]  Follow Up Time:     Neena Borrego  Rheumatology  [address] [facility], Floor 3  [place] [address]  Phone: [phone]  Fax: [phone]  Follow Up Time:    [facility]  [facility] [address]  Scheduled Appointment: 09/13/2023  2:40 PM  --------------------------- Assessment/Plan:  ALIZA FOLEY is a 62 year old female with PMH of pAFIB and sciatica; who presented to [facility] ED with SOB, and was found to have groundglass opacities and interstitial lung disease. She was treated with empiric Vancomycin and Zosyn. She underwent a bronchoscopy with bronchoalveolar lavage and pulse steroids. She was given IV diuretics for increased pulmonary congestion, but her respiratory status continued to worsen.  On 9/13, she was transferred to [facility] for ECMO requirements. She was further treated with Plasmapheresis, Rituximab and high-dose steroids, with significant improvement. Her overall hospital course was complicated by thrombocytopenia (s/p several platelet transfusions), leukocytosis (infectious workup entirely negative- s/p Vanco and Ceftriaxone), AFIB with RVR (resolved with Metoprolol), dysphagia (requiring NGT), transaminitis (secondary to acute illness and hypotension),  non-occlusive RIJ DVT (started on Lovenox). Patient now admitted for a multidisciplinary rehab program. 10-05-23 @ 13:18    * Surg consult/recs apprecaited-- RIJ non-occlusive thrombus --agreed with AC, await discussion with patient's private cardiologist before commencing  * Wound care review and recs apprecaited--Multiple wounds--perineal and buttock/sacral   * Ace wrap to both legs for edema managment  * Blood draws from Left arm Medline   * CMP today (last one was 10/6)  * Interval hypoglycemia--d/c slidding scale insulin   * Monitor BM--Bowel agents on hold due to recent loose BM    #Interstitial Lung Disease  - Gait Instability, ADL impairments and Functional impairments: start Comprehensive Rehab Program of PT/OT/SLP - 3 hours a day, 5 days a week  - P&O as needed   - Non-specific Interstitial Lung Disease  - Requiring ECMO   - Improvement s/p Plasmapheresis, Rituximab and high- dose steroids   - Albuterol/Ipratropium Nebulizer every 6 hours  - Mepron 1500mg daily  - PO Medrol ( due to liver dysfunction): Plan will be to taper by ~20% every 2 weeks  Medrol 64mg x 2 weeks  56mg x 2 weeks  44mg x 2 weeks   36mg x 2 weeks - Follow up Outpatient   -- Repeat CT Chest in 6 weeks   - Pulmonary following     #pAFIB/Rt Ij thrombus  - Metoprolol 25mg BID and lovenox  -- Cardiology consult--recm can switch to oral AC  --Await discussion with patient's cardiologist before switch to oral AC as requested by patient   (recent GI bleed, from hemorrhoid, resolved)      # Lymphedema both legs -chronic and Rt IJ thrombus  - BL LE doppler negative for DVT  - BL UE doppler with chronic thrombotic changes in RIJ   - Surg consult recs--commence anticoagulation as recs by cardiology    #Transaminitis   - Secondary to acute illness and hypotension at [facility]  - Trend LFTs  - Outpatient follow up     #Sleep  - Melatonin 6mg at HS    #Skin  - Skin: Left lower abdomen ruptured blister 3.0 x 1.0, stage 2 pressure injury to right buttock 4 x1 and left buttock 3x1, stage 2 pressure injury ti right inner thigh 0.2 x 0.2, right groin wound 10.5 x 2.0, Left groin wound 5 x 5,  right neck scab wound  -- MAD to skin folds- Nystatin to submammary and abdominal pannus BID  -- MAD to L groin- cleanse with NS, Triad cream daily  -- Mad to R groin- Nystatin powder daily   -- R groin wound-- aquacel packing, Triad to periwound, Allevyn foam- daily and PRN   - Pressure injury/Skin: OOB to Chair, PT/OT   - Offload pressure, low air loss support surfaces, T&P every 2 hours   --Wound care consult-10/9 -Multiple wounds--perineal and buttock/sacral, recs appreciated      #Pain Mgmt   - Tylenol PRN     #GI/Bowel Mgmt   - Pantoprazole  - Senna  --on hold due to recent Loose BM  - PRN: Maalox     #/Bladder Mgmt --voiding     #FEN   #Dysphagia  - Diet - Minced & Moist  [CC]    - Dysphaiga- SLP evaluation     #Health maintenance  - Folic Acid   - MVI  - Ocean nasal spray    # Difficulty with access to peripheral veins--  * Blood draws from Left arm Medline     #Precautions / PROPHYLAXIS:   - Falls  - ortho: Weight bearing status: WBAT   - Lungs: Aspiration, Incentive Spirometer   - DVT: Lovenox 70mg BID--switch to oals after clearing with patient's cardiologist   - RIJ DVT found at ECMO cannula   ---------------------------    * Labs--stable anemia, Bld sugar normal, on tapering steroid dose  CMP today 10/10    Liaison with family  Patient's partner present during review, details of review d/w her, detailed explanation of therapy protocol explained and she was happy with same  10/9--Partner present during review and discussed details treatment plan with her    Liaison with providers  Consult recs form multiple specialities being implemented  Pending vascular consult   ---------------------------  IDT conference on 10/10  TDD: 10/27 to home vs EMIGDIO.  Barriers: Obesity, poor skin integrity, poor endurance, BL UE weakness, poor coordination, pain, incontinent x2.  Social Work: Lives with spouse in [place] 6 months of year. DC to apt in [place] with elevator, no steps. Supportive spouse.   DME: Has 02 compressor.   OT: Max A for eating/grooming. Total for all other ADLs and transfers. Goal of Mod A.   PT: Damaris 2-3pr A. Sitting EOB is max.   SLP: SBS with TLs. Mild cog. Severe aphonia.  RT: --  Functional level on DC: Min A for transfers.  ---------------------------  OUTPATIENT/FOLLOW UP:    Trae Whitney  Pulmonary Disease  [address], [address] [place] [address]  Phone: [phone]  Fax: [phone]  Follow Up Time: 2 weeks    Ryanne Allen  Rheumatology  [address] [address]  Phone: [phone]  Fax: [phone]  Follow Up Time: 1 week    Kyle Pierce  Internal Medicine  [address] [place] [address], [address]  [place] [address]  Phone: [phone]  Fax: [phone]  Follow Up Time: 2 weeks    Addy Powers  Pulmonary Disease  [address] [facility], [address]  [place], [place] [address]  Phone: [phone]  Fax: [phone]  Follow Up Time:     Kwame Hawley  Critical Care Medicine  [address] [facility], [address]  [place] [address]  Phone: [phone]  Fax: [phone]  Follow Up Time:     Neena Borrego  Rheumatology  [address] [facility], [address]  [place] [address]  Phone: [phone]  Fax: [phone]  Follow Up Time:    [facility]  [facility] [address]  Scheduled Appointment: 09/13/2023  2:40 PM  --------------------------- Assessment/Plan:  ALIZA OFLEY is a 62 year old female with PMH of pAFIB and sciatica; who presented to [facility] ED with SOB, and was found to have groundglass opacities and interstitial lung disease. She was treated with empiric Vancomycin and Zosyn. She underwent a bronchoscopy with bronchoalveolar lavage and pulse steroids. She was given IV diuretics for increased pulmonary congestion, but her respiratory status continued to worsen.  On 9/13, she was transferred to [facility] for ECMO requirements. She was further treated with Plasmapheresis, Rituximab and high-dose steroids, with significant improvement. Her overall hospital course was complicated by thrombocytopenia (s/p several platelet transfusions), leukocytosis (infectious workup entirely negative- s/p Vanco and Ceftriaxone), AFIB with RVR (resolved with Metoprolol), dysphagia (requiring NGT), transaminitis (secondary to acute illness and hypotension),  non-occlusive RIJ DVT (started on Lovenox). Patient now admitted for a multidisciplinary rehab program. 10-05-23 @ 13:18    * Surg consult/recs apprecaited-- RIJ non-occlusive thrombus --agreed with AC, await discussion with patient's private cardiologist before commencing  * Wound care review and recs apprecaited--Multiple wounds--perineal and buttock/sacral   * Ace wrap to both legs for edema managment  * Blood draws from Left arm Medline   * CMP today (last one was 10/6)  * Interval hypoglycemia--d/c slidding scale insulin   * Monitor BM--Bowel agents on hold due to recent loose BM  * Unable to reach patient's private cardiologist on multiple trials, will discuss with patient and commence oral AC     #Interstitial Lung Disease  - Gait Instability, ADL impairments and Functional impairments: start Comprehensive Rehab Program of PT/OT/SLP - 3 hours a day, 5 days a week  - P&O as needed   - Non-specific Interstitial Lung Disease  - Requiring ECMO   - Improvement s/p Plasmapheresis, Rituximab and high- dose steroids   - Albuterol/Ipratropium Nebulizer every 6 hours  - Mepron 1500mg daily  - PO Medrol ( due to liver dysfunction): Plan will be to taper by ~20% every 2 weeks  Medrol 64mg x 2 weeks  56mg x 2 weeks  44mg x 2 weeks   36mg x 2 weeks - Follow up Outpatient   -- Repeat CT Chest in 6 weeks   - Pulmonary following     #pAFIB/Rt Ij thrombus  - Metoprolol 25mg BID and lovenox  -- Cardiology consult--recm can switch to oral AC  --Await discussion with patient's cardiologist before switch to oral AC as requested by patient   (recent GI bleed, from hemorrhoid, resolved)      # Lymphedema both legs -chronic and Rt IJ thrombus  - BL LE doppler negative for DVT  - BL UE doppler with chronic thrombotic changes in RIJ   - Surg consult recs--commence anticoagulation as recs by cardiology    #Transaminitis   - Secondary to acute illness and hypotension at [facility]  - Trend LFTs  - Outpatient follow up     #Sleep  - Melatonin 6mg at HS    #Skin  - Skin: Left lower abdomen ruptured blister 3.0 x 1.0, stage 2 pressure injury to right buttock 4 x1 and left buttock 3x1, stage 2 pressure injury ti right inner thigh 0.2 x 0.2, right groin wound 10.5 x 2.0, Left groin wound 5 x 5,  right neck scab wound  -- MAD to skin folds- Nystatin to submammary and abdominal pannus BID  -- MAD to L groin- cleanse with NS, Triad cream daily  -- Mad to R groin- Nystatin powder daily   -- R groin wound-- aquacel packing, Triad to periwound, Allevyn foam- daily and PRN   - Pressure injury/Skin: OOB to Chair, PT/OT   - Offload pressure, low air loss support surfaces, T&P every 2 hours   --Wound care consult-10/9 -Multiple wounds--perineal and buttock/sacral, recs appreciated      #Pain Mgmt   - Tylenol PRN     #GI/Bowel Mgmt   - Pantoprazole  - Senna  --on hold due to recent Loose BM  - PRN: Maalox     #/Bladder Mgmt --voiding     #FEN   #Dysphagia  - Diet - Minced & Moist  [CC]    - Dysphaiga- SLP evaluation     #Health maintenance  - Folic Acid   - MVI  - Ocean nasal spray    # Difficulty with access to peripheral veins--  * Blood draws from Left arm Medline     #Precautions / PROPHYLAXIS:   - Falls  - ortho: Weight bearing status: WBAT   - Lungs: Aspiration, Incentive Spirometer   - DVT: Lovenox 70mg BID--switch to oals after clearing with patient's cardiologist   - RIJ DVT found at ECMO cannula   ---------------------------    * Labs--stable anemia, Bld sugar normal, on tapering steroid dose  CMP today 10/10    Liaison with family  Patient's partner present during review, details of review d/w her, detailed explanation of therapy protocol explained and she was happy with same  10/9--Partner present during review and discussed details treatment plan with her    Liaison with providers  Consult recs form multiple specialities being implemented  Surgical consult and recs 10/9--agreed with plan for AC for Rt IJ chronic thrombus    10/10--No response to multiple calls to patient's private cardiologist Dr Otto Stratton  ph   Will discuss commencing oral anticoagulation with patient and commence     ---------------------------  IDT conference on 10/10  TDD: 10/27 to home vs EMIGDIO.  Barriers: Obesity, poor skin integrity, poor endurance, BL UE weakness, poor coordination, pain, incontinent x2.  Social Work: Lives with spouse in [place] 6 months of year. DC to apt in [place] with elevator, no steps. Supportive spouse.   DME: Has 02 compressor.   OT: Max A for eating/grooming. Total for all other ADLs and transfers. Goal of Mod A.   PT: Damaris 2-3pr A. Sitting EOB is max.   SLP: SBS with TLs. Mild cog. Severe aphonia.  RT: --  Functional level on DC: Min A for transfers.  ---------------------------  OUTPATIENT/FOLLOW UP:    Trae Whitney  Pulmonary Disease  [address], [address] [place] [address]  Phone: [phone]  Fax: [phone]  Follow Up Time: 2 weeks    Ryanne Allen  Rheumatology  [address] [address]  Phone: [phone]  Fax: [phone]  Follow Up Time: 1 week    Kyle Pierce  Internal Medicine  [facility] [place] [address], [address]  [place] [address]  Phone: [phone]  Fax: [phone]  Follow Up Time: 2 weeks    Addy Powers  Pulmonary Disease  [address] [facility], [address] [address]  Phone: [phone]  Fax: [phone]  Follow Up Time:     Kwame Hawley  Critical Care Medicine  [facility], [address] [address]  Phone: [phone]  Fax: [phone]  Follow Up Time:     Neena Borrego  Rheumatology  [address] [place] [address], [address]  [place] [address]  Phone: [phone]  Fax: [phone]  Follow Up Time:    [facility]  [facility] [address]  Scheduled Appointment: 09/13/2023  2:40 PM  ---------------------------

## 2023-10-10 NOTE — PROGRESS NOTE ADULT - SUBJECTIVE AND OBJECTIVE BOX
CC: Non-specific Interstitial Lung Disease     Today's Subjective & Objective Findings:  Patient seen and examined at bedside this AM with Dr. López.  Patient partner present during encounter.     Reports bleeding from her hemorrhoids 2 days prior and interval loose BM, all resolved with two dozes of imodium  Her partner feels that multiple/variety of home food items taken by patient during her sister's visit during the week end, may have contributed to the recurrence of hemorrhoidal bleeding  Reports regular BM yesterday 10/8  Had mild epistaxis this AM post PT, which resolved afterwards  On ENT f/u for epistaxis but reports that she experienced nasal bleeding with afrin nasal drops, hence she is using the saline nasal drops with response    Patient uncomfortable with multiple needle sticks, in attempt at blood draw, has veins that are difficult to be accessed  She requested labs to be taken from her left arm med line  Cleared with hospitalist to go ahead with this    We discussed cardiology recs to switch lovenox with oral AC,but she wants us to clear with her private cardiologist before doing this, which we have promised to do  She report no distress, tolerating oral diet     Denies headache, dizziness, visual changes, chest pain, SOB/MANZANO, abdominal pain, nausea, vomiting, diarrhea, dysuria, numbness or tingling.    Therapy-- engaging, motivated  PT session today, worked on LE motor strength and ROM    Cardiology review appreciated  Vascular consult will be requested as rec by cardiology to f/u on Rt IJ persisting thrombus as noted on imaging     Discussed imaging reports and lab results todasy Hb >9 with patient     VITALS  T(C): 36.5 (10-08-23 @ 20:58), Max: 36.5 (10-08-23 @ 20:58)  HR: 68 (10-09-23 @ 08:00) (68 - 78)  BP: 112/78 (10-09-23 @ 08:00) (102/67 - 116/71)  RR: 16 (10-09-23 @ 08:00) (16 - 16)  SpO2: 97% (10-09-23 @ 08:00) (97% - 99%)      PHYSICAL EXAM:  Gen -  Comfortable, sitting on a WC, Oxy sats normal on NC oxy 2L  HEENT - NAd, nostrils are dry, no bleeding, mod icterus  Neck - Supple, No limited ROM, O2 via NC  Pulm - air entry improving   Cardiovascular - RRR, S1S2  Chest - good chest expansion, good respiratory effort  Abdomen - Soft, non tender, +BS, scattered ecchymosis on abd  Extremities - No Cyanosis, no clubbing, 2+ edema to b/l feet, non pitting, no calf tenderness, LUE Med line    Neuro-     Cognitive - awake, alert, oriented to person, place, date, year.  Able to follow command appropriately     Communication -speech tone and volume improved      Attention: Intact     Memory:  memory intact     Cranial Nerves - CN 2-12 intact.     Motor -                    LEFT    UE - 4/5                    RIGHT UE - 4/5                     LEFT    LE - 2 +-/5 limited by leg edema                    RIGHT LE - 2+-/5  limitted by leg edema      Sensory - Intact        Reflexes - DTR 1+      Coordination - FTN  impaired  due to weakness   MSK: soreness and weakness  Psychiatric - Mood stable, Affect WNL  Skin: Left lower abdomen ruptured blister 3.0 x 1.0, stage 2 pressure injury to right buttock 4 x1 and left buttock 3x1, stage 2 pressure injury ti right inner thigh 0.2 x 0.2, right groin wound 10.5 x 2.0, Left groin wound 5 x 5,  right neck scab wound    LABS:                        9.6    13.92 )-----------( 209      ( 08 Oct 2023 11:00 )             30.8     PT/INR - ( 08 Oct 2023 11:00 )   PT: 10.9 sec;   INR: 0.95 ratio    PTT - ( 08 Oct 2023 11:00 )  PTT:27.6 sec    Cr 10/6--Cr 0.46    CAPILLARY BLOOD GLUCOSE  POCT Blood Glucose.: 109 mg/dL (09 Oct 2023 08:03)  POCT Blood Glucose.: 150 mg/dL (08 Oct 2023 21:58)  POCT Blood Glucose.: 203 mg/dL (08 Oct 2023 17:10)  POCT Blood Glucose.: 187 mg/dL (08 Oct 2023 12:04)          MEDICATIONS  (STANDING):  ammonium lactate 12% Lotion 1 Application(s) Topical every 12 hours  artificial  tears Solution 1 Drop(s) Both EYES every 12 hours  ascorbic acid 500 milliGRAM(s) Oral daily  atovaquone  Suspension 1500 milliGRAM(s) Oral daily  dextrose 5%. 1000 milliLiter(s) (50 mL/Hr) IV Continuous <Continuous>  dextrose 5%. 1000 milliLiter(s) (100 mL/Hr) IV Continuous <Continuous>  dextrose 50% Injectable 25 Gram(s) IV Push once  dextrose 50% Injectable 25 Gram(s) IV Push once  dextrose 50% Injectable 12.5 Gram(s) IV Push once  folic acid 1 milliGRAM(s) Oral daily  glucagon  Injectable 1 milliGRAM(s) IntraMuscular once  hemorrhoidal Ointment 1 Application(s) Rectal four times a day  influenza   Vaccine 0.5 milliLiter(s) IntraMuscular once  insulin lispro (ADMELOG) corrective regimen sliding scale   SubCutaneous Before meals and at bedtime  melatonin 6 milliGRAM(s) Oral at bedtime  methylPREDNISolone 64 milliGRAM(s) Oral daily  metoprolol tartrate 25 milliGRAM(s) Oral two times a day  multivitamin/minerals/iron Oral Solution (CENTRUM) 15 milliLiter(s) Oral daily  nystatin Powder 1 Application(s) Topical every 12 hours  pantoprazole    Tablet 40 milliGRAM(s) Oral before breakfast  sodium chloride 0.9% lock flush 5 milliLiter(s) IV Push two times a day  zinc oxide 40% Paste 1 Application(s) Topical two times a day  zinc sulfate 220 milliGRAM(s) Oral daily    MEDICATIONS  (PRN):  albuterol/ipratropium for Nebulization 3 milliLiter(s) Nebulizer every 6 hours PRN Shortness of Breath and/or Wheezing  aluminum hydroxide/magnesium hydroxide/simethicone Suspension 30 milliLiter(s) Oral every 4 hours PRN Dyspepsia  dextrose Oral Gel 15 Gram(s) Oral once PRN Blood Glucose LESS THAN 70 milliGRAM(s)/deciliter  simethicone 80 milliGRAM(s) Chew four times a day PRN Gas  sodium chloride 0.65% Nasal 1 Spray(s) Both Nostrils every 8 hours PRN Nasal Congestion CC: Non-specific Interstitial Lung Disease     Today's Subjective & Objective Findings:  Patient seen and examined at bedside this AM with Dr. López.  Patient partner present during encounter.   Reports no interval bleeding, had small volume BM yesterday, bowel agents on hold due to recent diarrhea  No abd pain or discomfort  Slept well  No further epistaxis    Interval Low bld sugar 58 responded to oral glucose drinks  Not Diabetic on baseline and bld sugar readings within normal/low normal except one reading of 263 probably postprandial    Therapy-- engaging, motivated  Observed during PT worked on muscle strengtheing    F/u Pulmonary review/recs appreciated  Hospitalist reviewed bld sugar and recs d/c slidding scale insulin  Wound care review and recs    ICU Vital Signs Last 24 Hrs  T(C): 36.3 (10 Oct 2023 07:40), Max: 36.4 (09 Oct 2023 21:30)  T(F): 97.4 (10 Oct 2023 07:40), Max: 97.5 (09 Oct 2023 21:30)  HR: 70 (10 Oct 2023 07:40) (68 - 72)  BP: 104/72 (10 Oct 2023 07:40) (99/62 - 114/70)  RR: 16 (10 Oct 2023 07:40) (16 - 16)  SpO2: 98% (09 Oct 2023 21:30) (96% - 98%)  O2 Parameters below as of 10 Oct 2023 07:40  Patient On (Oxygen Delivery Method): nasal cannula  O2 Flow (L/min): 3      PHYSICAL EXAM:  Gen -  Comfortable, sitting on a WC, Oxy sats normal on NC oxy 2L, oxy sats > 92%  HEENT - , nostrils are moist, no bleeding, mod icterus  Neck - Supple, No limited ROM, O2 via NC  Pulm - air entry improving   Cardiovascular - RRR, S1S2  Chest - good chest expansion, good respiratory effort  Abdomen - Soft, non tender, +BS, scattered ecchymosis on abd  Extremities - No Cyanosis, no clubbing, 2+ edema to b/l feet, non pitting, no calf tenderness, LUE Med line    Neuro-     Cognitive - awake, alert, oriented to person, place, date, year.  engaging and alert     Communication -speech tone and volume improved      Attention: Intact     Memory:  memory intact     Cranial Nerves - CN 2-12 intact.     Motor -                    LEFT    UE - 4/5                    RIGHT UE - 4/5                     LEFT    LE - 2 +-/5 limited by leg edema                    RIGHT LE - 2+-/5  limitted by leg edema      Sensory - Intact        Reflexes - DTR 1+      Coordination - FTN  impaired  due to weakness   MSK: soreness and weakness  Psychiatric - Mood stable, Affect WNL  Skin: Left lower abdomen ruptured blister 3.0 x 1.0, stage 2 pressure injury to right buttock 4 x1 and left buttock 3x1, stage 2 pressure injury ti right inner thigh 0.2 x 0.2, right groin wound 10.5 x 2.0, Left groin wound 5 x 5,  right neck scab wound    LABS:                        9.6    13.92 )-----------( 209      ( 08 Oct 2023 11:00 )             30.8     PT/INR - ( 08 Oct 2023 11:00 )   PT: 10.9 sec;   INR: 0.95 ratio    PTT - ( 08 Oct 2023 11:00 )  PTT:27.6 sec    Cr 10/6--Cr 0.46  Bld sugar as noted---53--203 in last 24hr     MEDICATIONS  (STANDING):  ammonium lactate 12% Lotion 1 Application(s) Topical every 12 hours  artificial  tears Solution 1 Drop(s) Both EYES every 12 hours  ascorbic acid 500 milliGRAM(s) Oral daily  atovaquone  Suspension 1500 milliGRAM(s) Oral daily  dextrose 5%. 1000 milliLiter(s) (50 mL/Hr) IV Continuous <Continuous>  dextrose 5%. 1000 milliLiter(s) (50 mL/Hr) IV Continuous <Continuous>  dextrose 5%. 1000 milliLiter(s) (100 mL/Hr) IV Continuous <Continuous>  dextrose 50% Injectable 25 Gram(s) IV Push once  dextrose 50% Injectable 25 Gram(s) IV Push once  dextrose 50% Injectable 12.5 Gram(s) IV Push once  enoxaparin Injectable 70 milliGRAM(s) SubCutaneous every 12 hours  folic acid 1 milliGRAM(s) Oral daily  glucagon  Injectable 1 milliGRAM(s) IntraMuscular once  hemorrhoidal Ointment 1 Application(s) Rectal four times a day  influenza   Vaccine 0.5 milliLiter(s) IntraMuscular once  insulin lispro (ADMELOG) corrective regimen sliding scale   SubCutaneous Before meals and at bedtime  melatonin 6 milliGRAM(s) Oral at bedtime  methylPREDNISolone 64 milliGRAM(s) Oral daily  metoprolol tartrate 25 milliGRAM(s) Oral two times a day  multivitamin/minerals/iron Oral Solution (CENTRUM) 15 milliLiter(s) Oral daily  nystatin Powder 1 Application(s) Topical every 12 hours  pantoprazole    Tablet 40 milliGRAM(s) Oral before breakfast  senna 2 Tablet(s) Oral at bedtime  sodium chloride 0.9% lock flush 5 milliLiter(s) IV Push two times a day  zinc oxide 40% Paste 1 Application(s) Topical two times a day  zinc sulfate 220 milliGRAM(s) Oral daily    MEDICATIONS  (PRN):  albuterol/ipratropium for Nebulization 3 milliLiter(s) Nebulizer every 6 hours PRN Shortness of Breath and/or Wheezing  aluminum hydroxide/magnesium hydroxide/simethicone Suspension 30 milliLiter(s) Oral every 4 hours PRN Dyspepsia  dextrose Oral Gel 15 Gram(s) Oral once PRN Blood Glucose LESS THAN 70 milliGRAM(s)/deciliter  simethicone 80 milliGRAM(s) Chew four times a day PRN Gas  sodium chloride 0.65% Nasal 1 Spray(s) Both Nostrils every 8 hours PRN Nasal Congestion   CC: Non-specific Interstitial Lung Disease     Today's Subjective & Objective Findings:  Patient seen and examined at bedside this AM with Dr. López.  Patient partner present during encounter.   Reports no interval bleeding, had small volume BM yesterday, bowel agents on hold due to recent diarrhea  No abd pain or discomfort  Slept well  No further epistaxis    Interval Low bld sugar 58 responded to oral glucose drinks  Not Diabetic on baseline and bld sugar readings within normal/low normal except one reading of 263 probably postprandial    Therapy-- engaging, motivated  Observed during PT worked on muscle strengtheing    F/u Pulmonary review/recs appreciated  Hospitalist reviewed bld sugar and recs d/c slidding scale insulin  Wound care review and recs    No response to multiple calls to patient's private cardiologist Dr Otto Stratton  ph   Will discuss commencing oral anticoagulation with patient and commence     ICU Vital Signs Last 24 Hrs  T(C): 36.3 (10 Oct 2023 07:40), Max: 36.4 (09 Oct 2023 21:30)  T(F): 97.4 (10 Oct 2023 07:40), Max: 97.5 (09 Oct 2023 21:30)  HR: 70 (10 Oct 2023 07:40) (68 - 72)  BP: 104/72 (10 Oct 2023 07:40) (99/62 - 114/70)  RR: 16 (10 Oct 2023 07:40) (16 - 16)  SpO2: 98% (09 Oct 2023 21:30) (96% - 98%)  O2 Parameters below as of 10 Oct 2023 07:40  Patient On (Oxygen Delivery Method): nasal cannula  O2 Flow (L/min): 3      PHYSICAL EXAM:  Gen -  Comfortable, sitting on a WC, Oxy sats normal on NC oxy 2L, oxy sats > 92%  HEENT - , nostrils are moist, no bleeding, mod icterus  Neck - Supple, No limited ROM, O2 via NC  Pulm - air entry improving   Cardiovascular - RRR, S1S2  Chest - good chest expansion, good respiratory effort  Abdomen - Soft, non tender, +BS, scattered ecchymosis on abd  Extremities - No Cyanosis, no clubbing, 2+ edema to b/l feet, non pitting, no calf tenderness, LUE Med line    Neuro-     Cognitive - awake, alert, oriented to person, place, date, year.  engaging and alert     Communication -speech tone and volume improved      Attention: Intact     Memory:  memory intact     Cranial Nerves - CN 2-12 intact.     Motor -                    LEFT    UE - 4/5                    RIGHT UE - 4/5                     LEFT    LE - 2 +-/5 limited by leg edema                    RIGHT LE - 2+-/5  limitted by leg edema      Sensory - Intact        Reflexes - DTR 1+      Coordination - FTN  impaired  due to weakness   MSK: soreness and weakness  Psychiatric - Mood stable, Affect WNL  Skin: Left lower abdomen ruptured blister 3.0 x 1.0, stage 2 pressure injury to right buttock 4 x1 and left buttock 3x1, stage 2 pressure injury ti right inner thigh 0.2 x 0.2, right groin wound 10.5 x 2.0, Left groin wound 5 x 5,  right neck scab wound    LABS:                        9.6    13.92 )-----------( 209      ( 08 Oct 2023 11:00 )             30.8     PT/INR - ( 08 Oct 2023 11:00 )   PT: 10.9 sec;   INR: 0.95 ratio    PTT - ( 08 Oct 2023 11:00 )  PTT:27.6 sec    Cr 10/6--Cr 0.46  Bld sugar as noted---53--203 in last 24hr     MEDICATIONS  (STANDING):  ammonium lactate 12% Lotion 1 Application(s) Topical every 12 hours  artificial  tears Solution 1 Drop(s) Both EYES every 12 hours  ascorbic acid 500 milliGRAM(s) Oral daily  atovaquone  Suspension 1500 milliGRAM(s) Oral daily  dextrose 5%. 1000 milliLiter(s) (50 mL/Hr) IV Continuous <Continuous>  dextrose 5%. 1000 milliLiter(s) (50 mL/Hr) IV Continuous <Continuous>  dextrose 5%. 1000 milliLiter(s) (100 mL/Hr) IV Continuous <Continuous>  dextrose 50% Injectable 25 Gram(s) IV Push once  dextrose 50% Injectable 25 Gram(s) IV Push once  dextrose 50% Injectable 12.5 Gram(s) IV Push once  enoxaparin Injectable 70 milliGRAM(s) SubCutaneous every 12 hours  folic acid 1 milliGRAM(s) Oral daily  glucagon  Injectable 1 milliGRAM(s) IntraMuscular once  hemorrhoidal Ointment 1 Application(s) Rectal four times a day  influenza   Vaccine 0.5 milliLiter(s) IntraMuscular once  insulin lispro (ADMELOG) corrective regimen sliding scale   SubCutaneous Before meals and at bedtime  melatonin 6 milliGRAM(s) Oral at bedtime  methylPREDNISolone 64 milliGRAM(s) Oral daily  metoprolol tartrate 25 milliGRAM(s) Oral two times a day  multivitamin/minerals/iron Oral Solution (CENTRUM) 15 milliLiter(s) Oral daily  nystatin Powder 1 Application(s) Topical every 12 hours  pantoprazole    Tablet 40 milliGRAM(s) Oral before breakfast  senna 2 Tablet(s) Oral at bedtime  sodium chloride 0.9% lock flush 5 milliLiter(s) IV Push two times a day  zinc oxide 40% Paste 1 Application(s) Topical two times a day  zinc sulfate 220 milliGRAM(s) Oral daily    MEDICATIONS  (PRN):  albuterol/ipratropium for Nebulization 3 milliLiter(s) Nebulizer every 6 hours PRN Shortness of Breath and/or Wheezing  aluminum hydroxide/magnesium hydroxide/simethicone Suspension 30 milliLiter(s) Oral every 4 hours PRN Dyspepsia  dextrose Oral Gel 15 Gram(s) Oral once PRN Blood Glucose LESS THAN 70 milliGRAM(s)/deciliter  simethicone 80 milliGRAM(s) Chew four times a day PRN Gas  sodium chloride 0.65% Nasal 1 Spray(s) Both Nostrils every 8 hours PRN Nasal Congestion

## 2023-10-10 NOTE — CHART NOTE - NSCHARTNOTEFT_GEN_A_CORE
IDT conference on 10/10  TDD: 10/27 to home vs [facility].  Barriers: Obesity, poor skin integrity, poor endurance, BL UE weakness, poor coordination, pain, incontinent x2.  Social Work: Lives with spouse in [place] 6 months of year. DC to apt in [place] with elevator, no steps. Supportive spouse.   DME: Has 02 compressor.   OT: Max A for eating/grooming. Total for all other ADLs and transfers. Goal of Mod A.   PT: Damaris 2-3pr A. Sitting EOB is max.   SLP: SBS with TLs. Mild cog. Severe aphonia.  RT: --  Functional level on DC: Min A for transfers.

## 2023-10-11 PROCEDURE — 99232 SBSQ HOSP IP/OBS MODERATE 35: CPT

## 2023-10-11 RX ORDER — HYDROCORTISONE 1 %
1 OINTMENT (GRAM) TOPICAL
Refills: 0 | Status: DISCONTINUED | OUTPATIENT
Start: 2023-10-11 | End: 2023-11-21

## 2023-10-11 RX ORDER — APIXABAN 2.5 MG/1
5 TABLET, FILM COATED ORAL EVERY 12 HOURS
Refills: 0 | Status: DISCONTINUED | OUTPATIENT
Start: 2023-10-11 | End: 2023-12-20

## 2023-10-11 RX ORDER — MULTIVIT-MIN/FERROUS GLUCONATE 9 MG/15 ML
1 LIQUID (ML) ORAL DAILY
Refills: 0 | Status: DISCONTINUED | OUTPATIENT
Start: 2023-10-11 | End: 2023-10-13

## 2023-10-11 RX ORDER — LOPERAMIDE HCL 2 MG
2 TABLET ORAL ONCE
Refills: 0 | Status: COMPLETED | OUTPATIENT
Start: 2023-10-11 | End: 2023-10-11

## 2023-10-11 RX ADMIN — SODIUM CHLORIDE 5 MILLILITER(S): 9 INJECTION INTRAMUSCULAR; INTRAVENOUS; SUBCUTANEOUS at 06:32

## 2023-10-11 RX ADMIN — Medication 1 SUPPOSITORY(S): at 18:36

## 2023-10-11 RX ADMIN — Medication 25 MILLIGRAM(S): at 17:39

## 2023-10-11 RX ADMIN — NYSTATIN CREAM 1 APPLICATION(S): 100000 CREAM TOPICAL at 17:39

## 2023-10-11 RX ADMIN — Medication 1 MILLIGRAM(S): at 12:55

## 2023-10-11 RX ADMIN — SODIUM CHLORIDE 5 MILLILITER(S): 9 INJECTION INTRAMUSCULAR; INTRAVENOUS; SUBCUTANEOUS at 17:36

## 2023-10-11 RX ADMIN — NYSTATIN CREAM 1 APPLICATION(S): 100000 CREAM TOPICAL at 06:28

## 2023-10-11 RX ADMIN — ZINC OXIDE 1 APPLICATION(S): 200 OINTMENT TOPICAL at 06:25

## 2023-10-11 RX ADMIN — Medication 500 MILLIGRAM(S): at 12:55

## 2023-10-11 RX ADMIN — ZINC OXIDE 1 APPLICATION(S): 200 OINTMENT TOPICAL at 21:43

## 2023-10-11 RX ADMIN — ZINC OXIDE 1 APPLICATION(S): 200 OINTMENT TOPICAL at 12:57

## 2023-10-11 RX ADMIN — APIXABAN 5 MILLIGRAM(S): 2.5 TABLET, FILM COATED ORAL at 17:39

## 2023-10-11 RX ADMIN — Medication 1 DROP(S): at 06:22

## 2023-10-11 RX ADMIN — Medication 15 MILLILITER(S): at 12:55

## 2023-10-11 RX ADMIN — PHENYLEPHRINE-SHARK LIVER OIL-MINERAL OIL-PETROLATUM RECTAL OINTMENT 1 APPLICATION(S): at 01:05

## 2023-10-11 RX ADMIN — Medication 2 MILLIGRAM(S): at 17:39

## 2023-10-11 RX ADMIN — Medication 64 MILLIGRAM(S): at 06:26

## 2023-10-11 RX ADMIN — PANTOPRAZOLE SODIUM 40 MILLIGRAM(S): 20 TABLET, DELAYED RELEASE ORAL at 06:27

## 2023-10-11 RX ADMIN — ENOXAPARIN SODIUM 70 MILLIGRAM(S): 100 INJECTION SUBCUTANEOUS at 06:25

## 2023-10-11 RX ADMIN — ATOVAQUONE 1500 MILLIGRAM(S): 750 SUSPENSION ORAL at 12:55

## 2023-10-11 RX ADMIN — Medication 25 MILLIGRAM(S): at 08:55

## 2023-10-11 RX ADMIN — ZINC SULFATE TAB 220 MG (50 MG ZINC EQUIVALENT) 220 MILLIGRAM(S): 220 (50 ZN) TAB at 12:55

## 2023-10-11 RX ADMIN — PHENYLEPHRINE-SHARK LIVER OIL-MINERAL OIL-PETROLATUM RECTAL OINTMENT 1 APPLICATION(S): at 08:55

## 2023-10-11 RX ADMIN — Medication 6 MILLIGRAM(S): at 21:41

## 2023-10-11 NOTE — PROGRESS NOTE ADULT - SUBJECTIVE AND OBJECTIVE BOX
Time of Visit:  Patient seen and examined. pat is lying in bed comfortable     MEDICATIONS  (STANDING):  apixaban 5 milliGRAM(s) Oral every 12 hours  artificial  tears Solution 1 Drop(s) Both EYES every 12 hours  ascorbic acid 500 milliGRAM(s) Oral daily  atovaquone  Suspension 1500 milliGRAM(s) Oral daily  dextrose 5%. 1000 milliLiter(s) (100 mL/Hr) IV Continuous <Continuous>  dextrose 5%. 1000 milliLiter(s) (50 mL/Hr) IV Continuous <Continuous>  dextrose 5%. 1000 milliLiter(s) (50 mL/Hr) IV Continuous <Continuous>  dextrose 50% Injectable 12.5 Gram(s) IV Push once  dextrose 50% Injectable 25 Gram(s) IV Push once  dextrose 50% Injectable 25 Gram(s) IV Push once  folic acid 1 milliGRAM(s) Oral daily  glucagon  Injectable 1 milliGRAM(s) IntraMuscular once  hemorrhoidal Ointment 1 Application(s) Rectal four times a day  influenza   Vaccine 0.5 milliLiter(s) IntraMuscular once  melatonin 6 milliGRAM(s) Oral at bedtime  methylPREDNISolone 64 milliGRAM(s) Oral daily  metoprolol tartrate 25 milliGRAM(s) Oral two times a day  multivitamin/minerals/iron Oral Solution (CENTRUM) 15 milliLiter(s) Oral daily  nystatin Powder 1 Application(s) Topical two times a day  pantoprazole    Tablet 40 milliGRAM(s) Oral before breakfast  senna 2 Tablet(s) Oral at bedtime  sodium chloride 0.9% lock flush 5 milliLiter(s) IV Push two times a day  zinc oxide 40% Paste 1 Application(s) Topical three times a day  zinc sulfate 220 milliGRAM(s) Oral daily      MEDICATIONS  (PRN):  albuterol/ipratropium for Nebulization 3 milliLiter(s) Nebulizer every 6 hours PRN Shortness of Breath and/or Wheezing  aluminum hydroxide/magnesium hydroxide/simethicone Suspension 30 milliLiter(s) Oral every 4 hours PRN Dyspepsia  dextrose Oral Gel 15 Gram(s) Oral once PRN Blood Glucose LESS THAN 70 milliGRAM(s)/deciliter  simethicone 80 milliGRAM(s) Chew four times a day PRN Gas  sodium chloride 0.65% Nasal 1 Spray(s) Both Nostrils every 8 hours PRN Nasal Congestion       Medications up to date at time of exam.      PHYSICAL EXAMINATION:  Patient has no new complaints.  GENERAL: The patient is a well-developed, well-nourished, in no apparent distress.     Vital Signs Last 24 Hrs  T(C): 36.3 (11 Oct 2023 08:49), Max: 36.3 (10 Oct 2023 20:30)  T(F): 97.4 (11 Oct 2023 08:49), Max: 97.4 (11 Oct 2023 08:49)  HR: 80 (11 Oct 2023 08:49) (67 - 80)  BP: 103/69 (11 Oct 2023 08:49) (96/70 - 106/70)  BP(mean): --  RR: 16 (11 Oct 2023 08:49) (16 - 16)  SpO2: 93% (11 Oct 2023 08:49) (93% - 98%)    Parameters below as of 11 Oct 2023 08:49  Patient On (Oxygen Delivery Method): nasal cannula  O2 Flow (L/min): 3     (if applicable)    Chest Tube (if applicable)    HEENT: Head is normocephalic and atraumatic. Extraocular muscles are intact. Mucous membranes are moist.     NECK: Supple, no palpable adenopathy.    LUNGS: Clear to auscultation, no wheezing, rales, or rhonchi.    HEART: Regular rate and rhythm without murmur.    ABDOMEN: Soft, nontender, and nondistended.  No hepatosplenomegaly is noted.    : No painful voiding, no pelvic pain    EXTREMITIES: Without any cyanosis, clubbing, rash, lesions or edema.    NEUROLOGIC: Awake, alert, oriented, grossly intact motor 4x 2 ext     SKIN: Warm, dry, good turgor.      LABS:    10-10    133<L>  |  98  |  20  ----------------------------<  240<H>  4.4   |  27  |  0.70    Ca    9.3      10 Oct 2023 11:00    TPro  5.4<L>  /  Alb  2.4<L>  /  TBili  7.5<H>  /  DBili  x   /  AST  139<H>  /  ALT  232<H>  /  AlkPhos  540<H>  10-10      Urinalysis Basic - ( 10 Oct 2023 11:00 )    Color: x / Appearance: x / SG: x / pH: x  Gluc: 240 mg/dL / Ketone: x  / Bili: x / Urobili: x   Blood: x / Protein: x / Nitrite: x   Leuk Esterase: x / RBC: x / WBC x   Sq Epi: x / Non Sq Epi: x / Bacteria: x        MICROBIOLOGY: (if applicable)    RADIOLOGY & ADDITIONAL STUDIES:  EKG:   CXR:  ECHO:    IMPRESSION: 62y Female PAST MEDICAL & SURGICAL HISTORY:  Afib      Sciatica       p/w     IMP:   62 yr old woman with CAD,  A-Fib with 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 S/p pulse steroids, PLEX (9/15, 9/18, 9/20) and Rituximab (9/16 & 10/3). Course c/b severe leukopenia s/p filgastrim, shock liver with slow to resolved hyperbilirubinemia, RIJ DVT, oropharyngeal dysphagia, and recurrent SVT. Repeat CT chest on 9/30 with markedly improved bilateral groundglass opacities with resolved lung consolidations. Now in acute rehab on 4 lpm NC oxygen and tapering dose of medrol.     Problem List:  1. Interstitial lung disease   2. Mixed connective tissue disease   3. Acute hypoxia  4. RIJ DVT     Plan:  - Stable respiratory status, on NC 2 LPM at rest, can increase to 5-6 LPM with activity. Goal SpO2>92%  - Cont. Methylprednisolone PO, taper as per rheumatology recs. from [facility]   - Continue atovaquone for PCP prophylaxis  - S/p Rituximab 9/16 and 10/3  - S/p PLEX during MICU stay   - Repeat CT scan in 6 weeks  - No evidence of active infection, monitor off antibiotics  - Cont. anticoagulation for DVT associated with ECMO cannula  - Consider incentive spirometry   - Care per primary team  - Discussed with spouse at bedside   - PT OOB as tolerated  - Outpatient pulmonary and rheumatology follow up upon discharge

## 2023-10-11 NOTE — PROGRESS NOTE ADULT - NS ATTEND AMEND GEN_ALL_CORE FT
Seen and examined, note revised, findings as noted    Patient and partner, report itching as noted and we discussed management plan, which is being implemented    Oral intake is improving, oxy sat normal with NC oxy,    Having regular bowel movements  Pressure injuries and Rt groin wound being addressed     Clinically stable  Alert and fully oriented    Continue therapy  D/c lovenox and commence apixiban  Continue wound care

## 2023-10-11 NOTE — CHART NOTE - NSCHARTNOTEFT_GEN_A_CORE
Approached patient at bedside for initial psychology consultation. Patient's partner is present. Neuropsychologist is introduced as a member of the rehabilitation team and the purpose of the visit is explained. Arranged for session within next business day, as patient is awaiting PCA care.   Neuropsychology remains available.

## 2023-10-11 NOTE — PROGRESS NOTE ADULT - ASSESSMENT
62 year old female with PMH of pAFIB and sciatica; who presented to [facility] ED with SOB, and was found to have groundglass opacities and interstitial lung disease. She was treated with empiric Vancomycin and Zosyn. She underwent a bronchoscopy with bronchoalveolar lavage and pulse steroids. She was given IV diuretics for increased pulmonary congestion, but her respiratory status continued to worsen.  On 9/13, she was transferred to [facility] for ECMO requirements. She was further treated with Plasmapheresis, Rituximab and high-dose steroids, with significant improvement. Her overall hospital course was complicated by thrombocytopenia (s/p several platelet transfusions), leukocytosis (infectious workup entirely negative- s/p Vanco and Ceftriaxone), AFIB with RVR (resolved with Metoprolol), dysphagia (requiring NGT), transaminitis (secondary to acute illness and hypotension),  non-occlusive RIJ DVT (started on Lovenox). Patient now admitted for a multidisciplinary rehab program- pt/ot/dvt ppx    #Interstitial Lung Disease, s/p ECMO   - s/p Plasmapheresis, Rituximab and high- dose steroids   - Albuterol/Ipratropium Nebulizer every 6 hours  - Mepron   - PO Medrol taper- Plan will be to taper by ~20% every 2 weeks  - Repeat CT Chest in 6 weeks   - o2 support prn  - pulm and cardio consult recc noted    #pAFIB  - Metoprolol     - DVT:  Lovenox 70mg BID- switch to Eliquis   - RIJ DVT found at ECMO cannula     #Transaminitis, jaundice  - Secondary to acute illness and hypotension at [facility]  - Trend LFTs, bili-  downtrending  - Outpatient follow up     #Sleep  - Melatonin       will follow  d/w rehab team

## 2023-10-11 NOTE — PROGRESS NOTE ADULT - SUBJECTIVE AND OBJECTIVE BOX
62 year old female with PMH of pAFIB and sciatica; who presented to [facility] ED with SOB. She has been experiencing chronic SOB and non-productive cough for several months and was worked up in [place] where she had a CT scan which resulted negative.  She has been evaluated by Pulmonology and Rheumatology and was ruled out for lupus and immunological disorders.  She recently went to Urgent Care due to SOB with ambulating short distances (12-15 feet), and an XR was concerning for BL LL infiltrates. While at [facility],  CXR and CTA were significant for ground glass opacities, and interstitial lung disease, respectively. She was treated with empiric Vancomycin and Zosyn. She underwent a bronchoscopy with bronchoalveolar lavage and pulse steroids. She was given IV diuretics for increased pulmonary congestion, but her respiratory status continued to worsen.  On 9/13, she was transferred to [facility] for ECMO requirements. She was further treated with Plasmapheresis, Rituximab and high-dose steroids, with significant improvement.   Her overall hospital course was complicated by thrombocytopenia (s/p several platelet transfusions), leukocytosis (infectious workup entirely negative- s/p Vanco and Ceftriaxone), AFIB with RVR (resolved with Metoprolol), dysphagia (requiring NGT), transaminitis (secondary to acute illness and hypotension),  non-occlusive RIJ DVT (started on Lovenox). PM&R was consulted and deemed her an appropriate candidate for IRF. She was medically stabilized and cleared for discharge to [facility].     seen at the bedside, no headache, no sob, no CP, no dizziness.      Vital Signs Last 24 Hrs  T(C): 36.3 (11 Oct 2023 08:49), Max: 36.3 (10 Oct 2023 20:30)  T(F): 97.4 (11 Oct 2023 08:49), Max: 97.4 (11 Oct 2023 08:49)  HR: 80 (11 Oct 2023 08:49) (67 - 80)  BP: 103/69 (11 Oct 2023 08:49) (96/70 - 106/70)  BP(mean): --  RR: 16 (11 Oct 2023 08:49) (16 - 16)  SpO2: 93% (11 Oct 2023 08:49) (93% - 98%)    Parameters below as of 11 Oct 2023 08:49  Patient On (Oxygen Delivery Method): nasal cannula  O2 Flow (L/min): 3      GENERAL- NAD  EAR/NOSE/MOUTH/THROAT -  MMM  EYES- MADAI, conjunctiva and Sclera clear- icterus+ b/l  NECK- supple  RESPIRATORY-  clear to auscultation bilaterally, non laboured breathing  CARDIOVASCULAR - SIS2, RRR  GI - soft NT BS present  EXTREMITIES-  b/l LE edema  NEUROLOGY-  b/l UE AND LE weakness  PSYCHIATRY- AAO X 3                x                    133  | 27   | 20           x     >-----------< x       ------------------------< 240                   x                    4.4  | 98   | 0.70                                         Ca 9.3   Mg x     Ph x          MEDICATIONS  (STANDING):  apixaban 5 milliGRAM(s) Oral every 12 hours  artificial  tears Solution 1 Drop(s) Both EYES every 12 hours  ascorbic acid 500 milliGRAM(s) Oral daily  atovaquone  Suspension 1500 milliGRAM(s) Oral daily  dextrose 5%. 1000 milliLiter(s) (50 mL/Hr) IV Continuous <Continuous>  dextrose 5%. 1000 milliLiter(s) (50 mL/Hr) IV Continuous <Continuous>  dextrose 5%. 1000 milliLiter(s) (100 mL/Hr) IV Continuous <Continuous>  dextrose 50% Injectable 25 Gram(s) IV Push once  dextrose 50% Injectable 25 Gram(s) IV Push once  dextrose 50% Injectable 12.5 Gram(s) IV Push once  folic acid 1 milliGRAM(s) Oral daily  glucagon  Injectable 1 milliGRAM(s) IntraMuscular once  hemorrhoidal Ointment 1 Application(s) Rectal four times a day  influenza   Vaccine 0.5 milliLiter(s) IntraMuscular once  melatonin 6 milliGRAM(s) Oral at bedtime  methylPREDNISolone 64 milliGRAM(s) Oral daily  metoprolol tartrate 25 milliGRAM(s) Oral two times a day  multivitamin/minerals/iron Oral Solution (CENTRUM) 15 milliLiter(s) Oral daily  nystatin Powder 1 Application(s) Topical two times a day  pantoprazole    Tablet 40 milliGRAM(s) Oral before breakfast  senna 2 Tablet(s) Oral at bedtime  sodium chloride 0.9% lock flush 5 milliLiter(s) IV Push two times a day  zinc oxide 40% Paste 1 Application(s) Topical three times a day  zinc sulfate 220 milliGRAM(s) Oral daily    MEDICATIONS  (PRN):  albuterol/ipratropium for Nebulization 3 milliLiter(s) Nebulizer every 6 hours PRN Shortness of Breath and/or Wheezing  aluminum hydroxide/magnesium hydroxide/simethicone Suspension 30 milliLiter(s) Oral every 4 hours PRN Dyspepsia  dextrose Oral Gel 15 Gram(s) Oral once PRN Blood Glucose LESS THAN 70 milliGRAM(s)/deciliter  simethicone 80 milliGRAM(s) Chew four times a day PRN Gas  sodium chloride 0.65% Nasal 1 Spray(s) Both Nostrils every 8 hours PRN Nasal Congestion

## 2023-10-11 NOTE — PROGRESS NOTE ADULT - ASSESSMENT
Assessment/Plan:  ALIZA FOLEY is a 62 year old female with PMH of pAFIB and sciatica; who presented to [facility] ED with SOB, and was found to have groundglass opacities and interstitial lung disease. She was treated with empiric Vancomycin and Zosyn. She underwent a bronchoscopy with bronchoalveolar lavage and pulse steroids. She was given IV diuretics for increased pulmonary congestion, but her respiratory status continued to worsen.  On 9/13, she was transferred to [facility] for ECMO requirements. She was further treated with Plasmapheresis, Rituximab and high-dose steroids, with significant improvement. Her overall hospital course was complicated by thrombocytopenia (s/p several platelet transfusions), leukocytosis (infectious workup entirely negative- s/p Vanco and Ceftriaxone), AFIB with RVR (resolved with Metoprolol), dysphagia (requiring NGT), transaminitis (secondary to acute illness and hypotension),  non-occlusive RIJ DVT (started on Lovenox). Patient now admitted for a multidisciplinary rehab program. 10-05-23 @ 13:18    * Surg consult/recs apprecaited-- RIJ non-occlusive thrombus -- patient agreeable start Eliquis 5mg BID   * Wound care review and recs apprecaited--Multiple wounds--perineal and buttock/sacral   * Blood draws from Left arm Midline   * Monitor BM--Bowel agents on hold due to recent loose BM    #Interstitial Lung Disease  - Gait Instability, ADL impairments and Functional impairments: start Comprehensive Rehab Program of PT/OT/SLP - 3 hours a day, 5 days a week  - P&O as needed   - Non-specific Interstitial Lung Disease  - Requiring ECMO   - Improvement s/p Plasmapheresis, Rituximab and high- dose steroids   - Albuterol/Ipratropium Nebulizer every 6 hours  - Mepron 1500mg daily  - PO Medrol ( due to liver dysfunction): Plan will be to taper by ~20% every 2 weeks  Medrol 64mg x 2 weeks  56mg x 2 weeks  44mg x 2 weeks   36mg x 2 weeks - Follow up Outpatient   -- Repeat CT Chest in 6 weeks   - Pulmonary following     #pAFIB/Rt Ij thrombus  - Metoprolol 25mg BID and lovenox  -- Cardiology consult--recm can switch to oral AC  --Await discussion with patient's cardiologist before switch to oral AC as requested by patient   (recent GI bleed, from hemorrhoid, resolved)      # Lymphedema both legs -chronic and Rt IJ thrombus  - ACE Wrap BL LEs  - BL LE doppler negative for DVT  - BL UE doppler with chronic thrombotic changes in RIJ   - Surg consult recs--commence anticoagulation as recs by cardiology  - Start Eliquis 5mg BID     #Transaminitis   - Secondary to acute illness and hypotension at [facility]  - Trend LFTs  - Outpatient follow up     #Sleep  - Melatonin 6mg at HS    #Skin  - Skin: Left lower abdomen ruptured blister 3.0 x 1.0, stage 2 pressure injury to right buttock 4 x1 and left buttock 3x1, stage 2 pressure injury ti right inner thigh 0.2 x 0.2, right groin wound 10.5 x 2.0, Left groin wound 5 x 5,  right neck scab wound  -- MAD to skin folds- Nystatin to submammary and abdominal pannus BID  -- MAD to L groin- cleanse with NS, Triad cream daily  -- Mad to R groin- Nystatin powder daily   -- R groin wound-- aquacel packing, Triad to periwound, Allevyn foam- daily and PRN   - Pressure injury/Skin: OOB to Chair, PT/OT   - Offload pressure, low air loss support surfaces, T&P every 2 hours   --Wound care consult-10/9 -Multiple wounds--perineal and buttock/sacral, recs appreciated      #Pain Mgmt   - Tylenol PRN     #GI/Bowel Mgmt   - Pantoprazole  - Senna  --on hold due to recent Loose BM  - PRN: Maalox     #/Bladder Mgmt --voiding     #FEN   #Dysphagia  - Diet - Minced & Moist  [CC]    - Dysphaiga- SLP evaluation     #Health maintenance  - Folic Acid   - MVI  - Ocean nasal spray    # Difficulty with access to peripheral veins--  * Blood draws from Left arm Medline     #Precautions / PROPHYLAXIS:   - Falls  - ortho: Weight bearing status: WBAT   - Lungs: Aspiration, Incentive Spirometer   - DVT PPX: Eliquis 5 mg BID   ---------------------------    * Labs--stable anemia, Bld sugar normal, on tapering steroid dose  CMP today 10/10    Liaison with family  Patient's partner present during review, details of review d/w her, detailed explanation of therapy protocol explained and she was happy with same  10/9--Partner present during review and discussed details treatment plan with her    Liaison with providers  Consult recs form multiple specialities being implemented  Surgical consult and recs 10/9--agreed with plan for AC for Rt IJ chronic thrombus    10/10--No response to multiple calls to patient's private cardiologist Dr Otto Stratton  ph   Will discuss commencing oral anticoagulation with patient and commence     ---------------------------  IDT conference on 10/10  TDD: 10/27 to home vs EMIGDIO.  Barriers: Obesity, poor skin integrity, poor endurance, BL UE weakness, poor coordination, pain, incontinent x2.  Social Work: Lives with spouse in [place] 6 months of year. DC to apt in [place] with elevator, no steps. Supportive spouse.   DME: Has 02 compressor.   OT: Max A for eating/grooming. Total for all other ADLs and transfers. Goal of Mod A.   PT: Damaris 2-3pr A. Sitting EOB is max.   SLP: SBS with TLs. Mild cog. Severe aphonia.  RT: --  Functional level on DC: Min A for transfers.  ---------------------------  OUTPATIENT/FOLLOW UP:    Trae Whitney  Pulmonary Disease  [address], [address] [place] [address]  Phone: [phone]  Fax: [phone]  Follow Up Time: 2 weeks    Ryanne Allen  Rheumatology  [address] [address]  Phone: [phone]  Fax: [phone]  Follow Up Time: 1 week    Kyle Pierce  Internal Medicine  [address] [place] [address], [address]  [place] [address]  Phone: [phone]  Fax: [phone]  Follow Up Time: 2 weeks    Addy Powers  Pulmonary Disease  [address] [facility], [address]  [place], [place] [address]  Phone: [phone]  Fax: [phone]  Follow Up Time:     Kwame Hawley  Critical Care Medicine  [address] [facility]  [place] [address]  Phone: [phone]  Fax: [phone]  Follow Up Time:     Neena Borrego  Rheumatology  [address] [facility], [address]  [place] [address]  Phone: [phone]  Fax: [phone]  Follow Up Time:    [facility]  [facility] [address] [facility]  Scheduled Appointment: 09/13/2023  2:40 PM  --------------------------- Assessment/Plan:  ALIZA FOLEY is a 62 year old female with PMH of pAFIB and sciatica; who presented to [facility] ED with SOB, and was found to have groundglass opacities and interstitial lung disease. She was treated with empiric Vancomycin and Zosyn. She underwent a bronchoscopy with bronchoalveolar lavage and pulse steroids. She was given IV diuretics for increased pulmonary congestion, but her respiratory status continued to worsen.  On 9/13, she was transferred to [facility] for ECMO requirements. She was further treated with Plasmapheresis, Rituximab and high-dose steroids, with significant improvement. Her overall hospital course was complicated by thrombocytopenia (s/p several platelet transfusions), leukocytosis (infectious workup entirely negative- s/p Vanco and Ceftriaxone), AFIB with RVR (resolved with Metoprolol), dysphagia (requiring NGT), transaminitis (secondary to acute illness and hypotension),  non-occlusive RIJ DVT (started on Lovenox). Patient now admitted for a multidisciplinary rehab program. 10-05-23 @ 13:18    * Surg consult/recs apprecaited-- RIJ non-occlusive thrombus -- patient agreeable start Eliquis 5mg BID   * Wound care review and recs apprecaited--Multiple wounds--perineal and buttock/sacral   * Blood draws from Left arm Midline   * Monitor BM--Bowel agents on hold due to recent loose BM  * Continue Pulmonary review and recs appreciated and implemented     #Interstitial Lung Disease  - Gait Instability, ADL impairments and Functional impairments: start Comprehensive Rehab Program of PT/OT/SLP - 3 hours a day, 5 days a week  - P&O as needed   - Non-specific Interstitial Lung Disease  - Requiring ECMO   - Improvement s/p Plasmapheresis, Rituximab and high- dose steroids   - Albuterol/Ipratropium Nebulizer every 6 hours  - Mepron 1500mg daily  - PO Medrol ( due to liver dysfunction): Plan will be to taper by ~20% every 2 weeks  Medrol 64mg x 2 weeks  56mg x 2 weeks  44mg x 2 weeks   36mg x 2 weeks - Follow up Outpatient   -- Repeat CT Chest in 6 weeks   - Pulmonary following     #pAFIB/Rt Ij thrombus  - Metoprolol 25mg BID and lovenox  -- Cardiology consult--recm can switch to oral AC  --Await discussion with patient's cardiologist before switch to oral AC as requested by patient   (recent GI bleed, from hemorrhoid, resolved)      # Lymphedema both legs -chronic and Rt IJ thrombus  - ACE Wrap BL LEs  - BL LE doppler negative for DVT  - BL UE doppler with chronic thrombotic changes in RIJ   - Surg consult recs--commence anticoagulation as recs by cardiology  - Start Eliquis 5mg BID     #Transaminitis   - Secondary to acute illness and hypotension at [facility]  - Trend LFTs  - Outpatient follow up     #Sleep  - Melatonin 6mg at HS    #Skin  - Skin: Left lower abdomen ruptured blister 3.0 x 1.0, stage 2 pressure injury to right buttock 4 x1 and left buttock 3x1, stage 2 pressure injury ti right inner thigh 0.2 x 0.2, right groin wound 10.5 x 2.0, Left groin wound 5 x 5,  right neck scab wound  -- MAD to skin folds- Nystatin to submammary and abdominal pannus BID  -- MAD to L groin- cleanse with NS, Triad cream daily  -- Mad to R groin- Nystatin powder daily   -- R groin wound-- aquacel packing, Triad to periwound, Allevyn foam- daily and PRN   - Pressure injury/Skin: OOB to Chair, PT/OT   - Offload pressure, low air loss support surfaces, T&P every 2 hours   --Wound care consult-10/9 -Multiple wounds--perineal and buttock/sacral, recs appreciated      #Pain Mgmt   - Tylenol PRN     #GI/Bowel Mgmt   - Pantoprazole  - Senna  --on hold due to recent Loose BM  - PRN: Maalox     #/Bladder Mgmt --voiding     #FEN   #Dysphagia  - Diet - Minced & Moist  [CC]    - Dysphaiga- SLP evaluation     #Health maintenance  - Folic Acid   - MVI  - Ocean nasal spray    # Difficulty with access to peripheral veins--  * Blood draws from Left arm Medline     #Precautions / PROPHYLAXIS:   - Falls  - ortho: Weight bearing status: WBAT   - Lungs: Aspiration, Incentive Spirometer   - DVT PPX: Eliquis 5 mg BID   ---------------------------    * Labs--stable anemia, Bld sugar normal, on tapering steroid dose  CMP today 10/10    Liaison with family  Patient's partner present during review, details of review d/w her, detailed explanation of therapy protocol explained and she was happy with same  10/9--Partner present during review and discussed details treatment plan with her    Liaison with providers  Consult recs form multiple specialities being implemented  Surgical consult and recs 10/9--agreed with plan for AC for Rt IJ chronic thrombus    10/10--No response to multiple calls to patient's private cardiologist Dr Otto Stratton  ph   Will discuss commencing oral anticoagulation with patient and commence     ---------------------------  IDT conference on 10/10  TDD: 10/27 to home vs EMIGDIO.  Barriers: Obesity, poor skin integrity, poor endurance, BL UE weakness, poor coordination, pain, incontinent x2.  Social Work: Lives with spouse in [place] 6 months of year. DC to apt in [place] with elevator, no steps. Supportive spouse.   DME: Has 02 compressor.   OT: Max A for eating/grooming. Total for all other ADLs and transfers. Goal of Mod A.   PT: Damaris 2-3pr A. Sitting EOB is max.   SLP: SBS with TLs. Mild cog. Severe aphonia.  RT: --  Functional level on DC: Min A for transfers.  ---------------------------  OUTPATIENT/FOLLOW UP:    Trae Whitney  Pulmonary Disease  [address], [address] [place] [address]  Phone: [phone]  Fax: [phone]  Follow Up Time: 2 weeks    Ryanne Allen  Rheumatology  [address] [address]  Phone: [phone]  Fax: [phone]  Follow Up Time: 1 week    Kyle Pierce  Internal Medicine  [address] [place] [address], [address]  [place] [address]  Phone: [phone]  Fax: [phone]  Follow Up Time: 2 weeks    Addy Powers  Pulmonary Disease  [address] [facility], [address]  [place], [place] [address]  Phone: [phone]  Fax: [phone]  Follow Up Time:     Kwame Hawley  Critical Care Medicine  [address] [facility], [facility] [address]  [place] [address]  Phone: [phone]  Fax: [phone]  Follow Up Time:     Neena Borrego  Rheumatology  [address] [place] [address], [address]  [place] [address]  Phone: [phone]  Fax: [phone]  Follow Up Time:    [facility]  [facility] [address]  Scheduled Appointment: 09/13/2023  2:40 PM  ---------------------------

## 2023-10-11 NOTE — PROGRESS NOTE ADULT - SUBJECTIVE AND OBJECTIVE BOX
CC: Non-specific Interstitial Lung Disease     Today's Subjective & Objective Findings:  Patient seen and examined at bedside this AM with Dr. López.  Patient partner present during encounter.   Admits to sleeping well.  Last BM on 10/10, per patient.  Discussed plan for DC on 10/27.  No other complaints at this time.    No response to multiple calls to patient's private cardiologist Dr Otto Stratton  ph   Will discuss commencing oral anticoagulation with patient and commence     Therapy-- engaging, motivated  Observed during PT worked on muscle strengtheing      Vital Signs Last 24 Hrs  T(C): 36.3 (11 Oct 2023 08:49), Max: 36.3 (10 Oct 2023 20:30)  T(F): 97.4 (11 Oct 2023 08:49), Max: 97.4 (11 Oct 2023 08:49)  HR: 80 (11 Oct 2023 08:49) (67 - 80)  BP: 103/69 (11 Oct 2023 08:49) (96/70 - 106/70)  BP(mean): --  RR: 16 (11 Oct 2023 08:49) (16 - 16)  SpO2: 93% (11 Oct 2023 08:49) (93% - 98%)      PHYSICAL EXAM:  Gen -  Comfortable, sitting on a WC, Oxy sats normal on NC oxy 2L, oxy sats > 92%  HEENT - , nostrils are moist, no bleeding, mod icterus  Neck - Supple, No limited ROM, O2 via NC  Pulm - air entry improving   Cardiovascular - RRR, S1S2  Chest - good chest expansion, good respiratory effort  Abdomen - Soft, non tender, +BS, scattered ecchymosis on abd  Extremities - No Cyanosis, no clubbing, 2+ edema to b/l feet, non pitting, no calf tenderness, LUE Med line    Neuro-     Cognitive - awake, alert, oriented to person, place, date, year.  engaging and alert     Communication -speech tone and volume improved      Attention: Intact     Memory:  memory intact     Cranial Nerves - CN 2-12 intact.     Motor -                    LEFT    UE - 4/5                    RIGHT UE - 4/5                     LEFT    LE - 2 +-/5 limited by leg edema                    RIGHT LE - 2+-/5  limitted by leg edema      Sensory - Intact        Reflexes - DTR 1+      Coordination - FTN  impaired  due to weakness   MSK: soreness and weakness  Psychiatric - Mood stable, Affect WNL  Skin: Left lower abdomen ruptured blister 3.0 x 1.0, stage 2 pressure injury to right buttock 4 x1 and left buttock 3x1, stage 2 pressure injury ti right inner thigh 0.2 x 0.2, right groin wound 10.5 x 2.0, Left groin wound 5 x 5,  right neck scab wound  LABS:    10-10    133<L>  |  98  |  20  ----------------------------<  240<H>  4.4   |  27  |  0.70    Ca    9.3      10 Oct 2023 11:00    TPro  5.4<L>  /  Alb  2.4<L>  /  TBili  7.5<H>  /  DBili  x   /  AST  139<H>  /  ALT  232<H>  /  AlkPhos  540<H>  10-10    Urinalysis Basic - ( 10 Oct 2023 11:00 )    Color: x / Appearance: x / SG: x / pH: x  Gluc: 240 mg/dL / Ketone: x  / Bili: x / Urobili: x   Blood: x / Protein: x / Nitrite: x   Leuk Esterase: x / RBC: x / WBC x   Sq Epi: x / Non Sq Epi: x / Bacteria: x        MEDICATIONS  (STANDING):  apixaban 5 milliGRAM(s) Oral every 12 hours  artificial  tears Solution 1 Drop(s) Both EYES every 12 hours  ascorbic acid 500 milliGRAM(s) Oral daily  atovaquone  Suspension 1500 milliGRAM(s) Oral daily  dextrose 5%. 1000 milliLiter(s) (50 mL/Hr) IV Continuous <Continuous>  dextrose 5%. 1000 milliLiter(s) (50 mL/Hr) IV Continuous <Continuous>  dextrose 5%. 1000 milliLiter(s) (100 mL/Hr) IV Continuous <Continuous>  dextrose 50% Injectable 25 Gram(s) IV Push once  dextrose 50% Injectable 25 Gram(s) IV Push once  dextrose 50% Injectable 12.5 Gram(s) IV Push once  folic acid 1 milliGRAM(s) Oral daily  glucagon  Injectable 1 milliGRAM(s) IntraMuscular once  hemorrhoidal Ointment 1 Application(s) Rectal four times a day  influenza   Vaccine 0.5 milliLiter(s) IntraMuscular once  melatonin 6 milliGRAM(s) Oral at bedtime  methylPREDNISolone 64 milliGRAM(s) Oral daily  metoprolol tartrate 25 milliGRAM(s) Oral two times a day  multivitamin/minerals/iron Oral Solution (CENTRUM) 15 milliLiter(s) Oral daily  nystatin Powder 1 Application(s) Topical two times a day  pantoprazole    Tablet 40 milliGRAM(s) Oral before breakfast  senna 2 Tablet(s) Oral at bedtime  sodium chloride 0.9% lock flush 5 milliLiter(s) IV Push two times a day  zinc oxide 40% Paste 1 Application(s) Topical three times a day  zinc sulfate 220 milliGRAM(s) Oral daily    MEDICATIONS  (PRN):  albuterol/ipratropium for Nebulization 3 milliLiter(s) Nebulizer every 6 hours PRN Shortness of Breath and/or Wheezing  aluminum hydroxide/magnesium hydroxide/simethicone Suspension 30 milliLiter(s) Oral every 4 hours PRN Dyspepsia  dextrose Oral Gel 15 Gram(s) Oral once PRN Blood Glucose LESS THAN 70 milliGRAM(s)/deciliter  simethicone 80 milliGRAM(s) Chew four times a day PRN Gas  sodium chloride 0.65% Nasal 1 Spray(s) Both Nostrils every 8 hours PRN Nasal Congestion   CC: Non-specific Interstitial Lung Disease     Today's Subjective & Objective Findings:  Patient seen and examined at bedside this AM with Dr. López.  Patient partner present during encounter. She reports sleeping well and they complimented the efforts of a male PCA on night duty yesterday, who was very helpful to her   Admits to sleeping well.  She report itching at lower abd skin folds where she has a rash with skin break, being treated with nystop powder  She reports response to treatment during acute care with  a washcloth over the nystatin powder at this tare  We promised to explore getting this arrangement, to facilitate her care   She expressed desire to have prompt attention for body care when requested  We reassured her that we will work with nursing to ensure that her needs are appropriately addressed    Discussed details of discussion at IDT yesterday and dc plan for DC on 10/27.  Patient and partner agreed to this but expressed their desire to have as much time as possible including and extended AR stay  They are also agreeable to exploring EMIGDIO placement in some select facilities  No other complaints at this time.    No response to multiple calls to patient's private cardiologist Dr Otto Stratton, [facility] 10/10  ph   Will discuss commencing oral anticoagulation with patient and commence     Therapy-- engaging, motivated  Working on muscle strengthening LE muscles including stretching of the gastroc/soleus muscles    We discussed inability to contact her private cardiology and need to switch to oral AC which would be as protective as lovenox and prevent abd ecchymosis at inj sites  She agreed to this       Vital Signs Last 24 Hrs  T(C): 36.3 (11 Oct 2023 08:49), Max: 36.3 (10 Oct 2023 20:30)  T(F): 97.4 (11 Oct 2023 08:49), Max: 97.4 (11 Oct 2023 08:49)  HR: 80 (11 Oct 2023 08:49) (67 - 80)  BP: 103/69 (11 Oct 2023 08:49) (96/70 - 106/70)  RR: 16 (11 Oct 2023 08:49) (16 - 16)  SpO2: 93% (11 Oct 2023 08:49) (93% - 98%)      PHYSICAL EXAM:  Gen -  Comfortable, sitting on a WC, Oxy sats normal on NC oxy 3L, oxy sats  persistently > 92%  HEENT - , nostrils are moist, no bleeding, mod icterus  Neck - Supple, No limited ROM, O2 via NC  Pulm - air entry improving   Cardiovascular - RRR, S1S2  Chest - good chest expansion, good respiratory effort  Abdomen - Soft, non tender, +BS, scattered ecchymosis on abd  Extremities - No Cyanosis, no clubbing, 2+ edema to b/l feet, non pitting, no calf tenderness, LUE Med line    Neuro-     Cognitive - awake, alert, fully oriented     Communication -speech tone and volume improved      Attention: Intact     Memory:  memory intact     Cranial Nerves - CN 2-12 intact.     Motor -                    LEFT    UE - 4/5                    RIGHT UE - 4/5                     LEFT    LE - 2 +-/5 limited by leg edema                    RIGHT LE - 2+-/5  limitted by leg edema      Sensory - Intact        Reflexes - DTR 1+      Coordination - FTN  impaired  due to weakness   MSK: soreness and weakness  Psychiatric - Mood stable, Affect WNL  Skin: Left lower abdomen ruptured blister 3.0 x 1.0, stage 2 pressure injury to right buttock 4 x1 and left buttock 3x1, stage 2 pressure injury ti right inner thigh 0.2 x 0.2, right groin wound 10.5 x 2.0, Left groin wound 5 x 5,  right neck scab wound  LABS:    10-10    133<L>  |  98  |  20  ----------------------------<  240<H>  4.4   |  27  |  0.70    Ca    9.3      10 Oct 2023 11:00    TPro  5.4<L>  /  Alb  2.4<L>  /  TBili  7.5<H>  /  DBili  x   /  AST  139<H>  /  ALT  232<H>  /  AlkPhos  540<H>  10-10    Urinalysis Basic - ( 10 Oct 2023 11:00 )    Color: x / Appearance: x / SG: x / pH: x  Gluc: 240 mg/dL / Ketone: x  / Bili: x / Urobili: x   Blood: x / Protein: x / Nitrite: x   Leuk Esterase: x / RBC: x / WBC x   Sq Epi: x / Non Sq Epi: x / Bacteria: x    MEDICATIONS  (STANDING):  apixaban 5 milliGRAM(s) Oral every 12 hours  artificial  tears Solution 1 Drop(s) Both EYES every 12 hours  ascorbic acid 500 milliGRAM(s) Oral daily  atovaquone  Suspension 1500 milliGRAM(s) Oral daily  dextrose 5%. 1000 milliLiter(s) (50 mL/Hr) IV Continuous <Continuous>  dextrose 5%. 1000 milliLiter(s) (50 mL/Hr) IV Continuous <Continuous>  dextrose 5%. 1000 milliLiter(s) (100 mL/Hr) IV Continuous <Continuous>  dextrose 50% Injectable 25 Gram(s) IV Push once  dextrose 50% Injectable 25 Gram(s) IV Push once  dextrose 50% Injectable 12.5 Gram(s) IV Push once  folic acid 1 milliGRAM(s) Oral daily  glucagon  Injectable 1 milliGRAM(s) IntraMuscular once  hemorrhoidal Ointment 1 Application(s) Rectal four times a day  influenza   Vaccine 0.5 milliLiter(s) IntraMuscular once  melatonin 6 milliGRAM(s) Oral at bedtime  methylPREDNISolone 64 milliGRAM(s) Oral daily  metoprolol tartrate 25 milliGRAM(s) Oral two times a day  multivitamin/minerals/iron Oral Solution (CENTRUM) 15 milliLiter(s) Oral daily  nystatin Powder 1 Application(s) Topical two times a day  pantoprazole    Tablet 40 milliGRAM(s) Oral before breakfast  senna 2 Tablet(s) Oral at bedtime  sodium chloride 0.9% lock flush 5 milliLiter(s) IV Push two times a day  zinc oxide 40% Paste 1 Application(s) Topical three times a day  zinc sulfate 220 milliGRAM(s) Oral daily    MEDICATIONS  (PRN):  albuterol/ipratropium for Nebulization 3 milliLiter(s) Nebulizer every 6 hours PRN Shortness of Breath and/or Wheezing  aluminum hydroxide/magnesium hydroxide/simethicone Suspension 30 milliLiter(s) Oral every 4 hours PRN Dyspepsia  dextrose Oral Gel 15 Gram(s) Oral once PRN Blood Glucose LESS THAN 70 milliGRAM(s)/deciliter  simethicone 80 milliGRAM(s) Chew four times a day PRN Gas  sodium chloride 0.65% Nasal 1 Spray(s) Both Nostrils every 8 hours PRN Nasal Congestion

## 2023-10-12 LAB
ALBUMIN SERPL ELPH-MCNC: 2.3 G/DL — LOW (ref 3.3–5)
ALBUMIN SERPL ELPH-MCNC: 2.3 G/DL — LOW (ref 3.3–5)
ALP SERPL-CCNC: 405 U/L — HIGH (ref 40–120)
ALP SERPL-CCNC: 405 U/L — HIGH (ref 40–120)
ALT FLD-CCNC: 172 U/L — HIGH (ref 10–45)
ALT FLD-CCNC: 172 U/L — HIGH (ref 10–45)
ANION GAP SERPL CALC-SCNC: 6 MMOL/L — SIGNIFICANT CHANGE UP (ref 5–17)
ANION GAP SERPL CALC-SCNC: 6 MMOL/L — SIGNIFICANT CHANGE UP (ref 5–17)
ANISOCYTOSIS BLD QL: SLIGHT — SIGNIFICANT CHANGE UP
AST SERPL-CCNC: 64 U/L — HIGH (ref 10–40)
AST SERPL-CCNC: 64 U/L — HIGH (ref 10–40)
BASOPHILS # BLD AUTO: 0 K/UL — SIGNIFICANT CHANGE UP (ref 0–0.2)
BASOPHILS NFR BLD AUTO: 0 % — SIGNIFICANT CHANGE UP (ref 0–2)
BILIRUB SERPL-MCNC: 6.3 MG/DL — HIGH (ref 0.2–1.2)
BILIRUB SERPL-MCNC: 6.3 MG/DL — HIGH (ref 0.2–1.2)
BUN SERPL-MCNC: 16 MG/DL — SIGNIFICANT CHANGE UP (ref 7–23)
BUN SERPL-MCNC: 16 MG/DL — SIGNIFICANT CHANGE UP (ref 7–23)
CALCIUM SERPL-MCNC: 8.9 MG/DL — SIGNIFICANT CHANGE UP (ref 8.4–10.5)
CALCIUM SERPL-MCNC: 8.9 MG/DL — SIGNIFICANT CHANGE UP (ref 8.4–10.5)
CHLORIDE SERPL-SCNC: 100 MMOL/L — SIGNIFICANT CHANGE UP (ref 96–108)
CHLORIDE SERPL-SCNC: 100 MMOL/L — SIGNIFICANT CHANGE UP (ref 96–108)
CO2 SERPL-SCNC: 29 MMOL/L — SIGNIFICANT CHANGE UP (ref 22–31)
CO2 SERPL-SCNC: 29 MMOL/L — SIGNIFICANT CHANGE UP (ref 22–31)
CREAT SERPL-MCNC: 0.45 MG/DL — LOW (ref 0.5–1.3)
CREAT SERPL-MCNC: 0.45 MG/DL — LOW (ref 0.5–1.3)
EGFR: 108 ML/MIN/1.73M2 — SIGNIFICANT CHANGE UP
EGFR: 108 ML/MIN/1.73M2 — SIGNIFICANT CHANGE UP
EOSINOPHIL # BLD AUTO: 0 K/UL — SIGNIFICANT CHANGE UP (ref 0–0.5)
EOSINOPHIL NFR BLD AUTO: 0 % — SIGNIFICANT CHANGE UP (ref 0–6)
GLUCOSE SERPL-MCNC: 196 MG/DL — HIGH (ref 70–99)
GLUCOSE SERPL-MCNC: 196 MG/DL — HIGH (ref 70–99)
HCT VFR BLD CALC: 30.5 % — LOW (ref 34.5–45)
HCT VFR BLD CALC: 30.5 % — LOW (ref 34.5–45)
HGB BLD-MCNC: 9.5 G/DL — LOW (ref 11.5–15.5)
HGB BLD-MCNC: 9.5 G/DL — LOW (ref 11.5–15.5)
HYPOCHROMIA BLD QL: SLIGHT — SIGNIFICANT CHANGE UP
LYMPHOCYTES # BLD AUTO: 0.19 K/UL — LOW (ref 1–3.3)
LYMPHOCYTES # BLD AUTO: 1 % — LOW (ref 13–44)
MANUAL SMEAR VERIFICATION: SIGNIFICANT CHANGE UP
MCHC RBC-ENTMCNC: 31.1 GM/DL — LOW (ref 32–36)
MCHC RBC-ENTMCNC: 31.1 GM/DL — LOW (ref 32–36)
MCHC RBC-ENTMCNC: 33 PG — SIGNIFICANT CHANGE UP (ref 27–34)
MCHC RBC-ENTMCNC: 33 PG — SIGNIFICANT CHANGE UP (ref 27–34)
MCV RBC AUTO: 105.9 FL — HIGH (ref 80–100)
MCV RBC AUTO: 105.9 FL — HIGH (ref 80–100)
MONOCYTES # BLD AUTO: 0.57 K/UL — SIGNIFICANT CHANGE UP (ref 0–0.9)
MONOCYTES NFR BLD AUTO: 3 % — SIGNIFICANT CHANGE UP (ref 2–14)
MYELOCYTES NFR BLD: 1 % — HIGH (ref 0–0)
NEUTROPHILS # BLD AUTO: 18 K/UL — HIGH (ref 1.8–7.4)
NEUTROPHILS NFR BLD AUTO: 95 % — HIGH (ref 43–77)
NRBC # BLD: 0 /100 WBCS — SIGNIFICANT CHANGE UP (ref 0–0)
NRBC # BLD: 0 /100 WBCS — SIGNIFICANT CHANGE UP (ref 0–0)
NRBC # BLD: 0 /100 — SIGNIFICANT CHANGE UP (ref 0–0)
PLAT MORPH BLD: NORMAL — SIGNIFICANT CHANGE UP
PLATELET # BLD AUTO: 216 K/UL — SIGNIFICANT CHANGE UP (ref 150–400)
PLATELET # BLD AUTO: 216 K/UL — SIGNIFICANT CHANGE UP (ref 150–400)
POTASSIUM SERPL-MCNC: 4 MMOL/L — SIGNIFICANT CHANGE UP (ref 3.5–5.3)
POTASSIUM SERPL-MCNC: 4 MMOL/L — SIGNIFICANT CHANGE UP (ref 3.5–5.3)
POTASSIUM SERPL-SCNC: 4 MMOL/L — SIGNIFICANT CHANGE UP (ref 3.5–5.3)
POTASSIUM SERPL-SCNC: 4 MMOL/L — SIGNIFICANT CHANGE UP (ref 3.5–5.3)
PROCALCITONIN SERPL-MCNC: 0.25 NG/ML — HIGH
PROT SERPL-MCNC: 5 G/DL — LOW (ref 6–8.3)
PROT SERPL-MCNC: 5 G/DL — LOW (ref 6–8.3)
RBC # BLD: 2.88 M/UL — LOW (ref 3.8–5.2)
RBC # BLD: 2.88 M/UL — LOW (ref 3.8–5.2)
RBC # FLD: 19.6 % — HIGH (ref 10.3–14.5)
RBC # FLD: 19.6 % — HIGH (ref 10.3–14.5)
RBC BLD AUTO: ABNORMAL
SODIUM SERPL-SCNC: 135 MMOL/L — SIGNIFICANT CHANGE UP (ref 135–145)
SODIUM SERPL-SCNC: 135 MMOL/L — SIGNIFICANT CHANGE UP (ref 135–145)
WBC # BLD: 18.95 K/UL — HIGH (ref 3.8–10.5)
WBC # BLD: 18.95 K/UL — HIGH (ref 3.8–10.5)
WBC # FLD AUTO: 18.95 K/UL — HIGH (ref 3.8–10.5)
WBC # FLD AUTO: 18.95 K/UL — HIGH (ref 3.8–10.5)

## 2023-10-12 PROCEDURE — 90791 PSYCH DIAGNOSTIC EVALUATION: CPT

## 2023-10-12 PROCEDURE — 99232 SBSQ HOSP IP/OBS MODERATE 35: CPT

## 2023-10-12 RX ORDER — PSYLLIUM SEED (WITH DEXTROSE)
1 POWDER (GRAM) ORAL DAILY
Refills: 0 | Status: DISCONTINUED | OUTPATIENT
Start: 2023-10-12 | End: 2023-11-21

## 2023-10-12 RX ORDER — LOPERAMIDE HCL 2 MG
2 TABLET ORAL THREE TIMES A DAY
Refills: 0 | Status: DISCONTINUED | OUTPATIENT
Start: 2023-10-12 | End: 2023-12-20

## 2023-10-12 RX ADMIN — Medication 64 MILLIGRAM(S): at 06:22

## 2023-10-12 RX ADMIN — ZINC OXIDE 1 APPLICATION(S): 200 OINTMENT TOPICAL at 21:44

## 2023-10-12 RX ADMIN — Medication 1 DROP(S): at 17:46

## 2023-10-12 RX ADMIN — Medication 1 PACKET(S): at 12:34

## 2023-10-12 RX ADMIN — NYSTATIN CREAM 1 APPLICATION(S): 100000 CREAM TOPICAL at 16:21

## 2023-10-12 RX ADMIN — SODIUM CHLORIDE 5 MILLILITER(S): 9 INJECTION INTRAMUSCULAR; INTRAVENOUS; SUBCUTANEOUS at 06:48

## 2023-10-12 RX ADMIN — APIXABAN 5 MILLIGRAM(S): 2.5 TABLET, FILM COATED ORAL at 06:23

## 2023-10-12 RX ADMIN — SODIUM CHLORIDE 5 MILLILITER(S): 9 INJECTION INTRAMUSCULAR; INTRAVENOUS; SUBCUTANEOUS at 17:46

## 2023-10-12 RX ADMIN — ZINC OXIDE 1 APPLICATION(S): 200 OINTMENT TOPICAL at 06:24

## 2023-10-12 RX ADMIN — Medication 25 MILLIGRAM(S): at 17:46

## 2023-10-12 RX ADMIN — Medication 1 SUPPOSITORY(S): at 16:18

## 2023-10-12 RX ADMIN — Medication 2 MILLIGRAM(S): at 09:11

## 2023-10-12 RX ADMIN — ZINC OXIDE 1 APPLICATION(S): 200 OINTMENT TOPICAL at 14:24

## 2023-10-12 RX ADMIN — NYSTATIN CREAM 1 APPLICATION(S): 100000 CREAM TOPICAL at 06:24

## 2023-10-12 RX ADMIN — Medication 1 TABLET(S): at 12:34

## 2023-10-12 RX ADMIN — Medication 1 DROP(S): at 06:29

## 2023-10-12 RX ADMIN — APIXABAN 5 MILLIGRAM(S): 2.5 TABLET, FILM COATED ORAL at 17:46

## 2023-10-12 RX ADMIN — Medication 1 SUPPOSITORY(S): at 08:43

## 2023-10-12 RX ADMIN — Medication 6 MILLIGRAM(S): at 21:30

## 2023-10-12 NOTE — CONSULT NOTE ADULT - SUBJECTIVE AND OBJECTIVE BOX
Patient seen at bedside for 60-minute psychology consultation. Her partner, Kiara, is present and participates in session, per patient preference. Neuropsychologist is introduced as a member of the rehabilitation team and the purpose of the session is explained. Patient agrees to participate.    Per patient, "I really don't know what happened to me." She indicates becoming ill while in [place] and coming up to be seen at a hosptial in [place]. She notes initially being treated at [facility] and was found to have "ground glass" in her lungs. On 9/13, she was placed on ECMO at [facility]. She notes having interstitial lung disease and is physically weakened.     Medical records are reviewed. Per records: Zo Hager is a 62-year-old, female, with PMH of pAFIB and sciatica; who presented to [facility] ED with SOB. She has been experiencing chronic SOB and non-productive cough for several months and was worked up in [place] where she had a CT scan which resulted negative.  She has been evaluated by Pulmonology and Rheumatology and was ruled out for lupus and immunological disorders. She recently went to Urgent Care due to SOB with ambulating short distances (12-15 feet), and an XR was concerning for BL LL infiltrates. While at [facility],  CXR and CTA were significant for ground glass opacities, and interstitial lung disease, respectively. She was treated with empiric Vancomycin and Zosyn. She underwent a bronchoscopy with bronchoalveolar lavage and pulse steroids. She was given IV diuretics for increased pulmonary congestion, but her respiratory status continued to worsen. On 9/13, she was transferred to [facility] for ECMO requirements. She was further treated with Plasmapheresis, Rituximab and high-dose steroids, with significant improvement.     Her overall hospital course was complicated by thrombocytopenia (s/p several platelet transfusions), leukocytosis (infectious workup entirely negative- s/p Vanco and Ceftriaxone), AFIB with RVR (resolved with Metoprolol), dysphagia (requiring NGT), transaminitis (secondary to acute illness and hypotension), non-occlusive RIJ DVT (started on Lovenox). PM&R was consulted and deemed her an appropriate candidate for IRF. She was medically stabilized and cleared for discharge to [facility].

## 2023-10-12 NOTE — CONSULT NOTE ADULT - ASSESSMENT
Patient is initially seated in her wheelchair, then transferred to bed due to discomfort with sitting up. Patient is alert and oriented to person, place, time, and situation. She indicates pain with sitting too long and needs to either reposition or go into bed. Appetite is good, though she tries to limit intake to minimize going to the bathroom. Sleep is reported as poor.    Emotional functioning: Mood is irritable and frustrated. Patient has insight and awareness of mood and indicates that she has been lashing out verbally today. She is bothered by pain, poor sleep, and feelings of frustration with dependency on staff. She finds her ability to concentrate on cognitive tasks presented to her (e.g., simple addition) is reduced. She notes the priority is for her to improve physically. 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 She notes the PCAs are good to her and everyone is trying to help, but she is frustrated by factors outside of her control and that "pain ruins my mood."     She indicates seeing psychologists in the past to address stressors at different points in her life. She denies ideation, plan, or intent to harm self or other. She denies hallucinations. She denies use of ETOH or substances. She does not smoke.    Psychosocial history: She was born and raised in [place]. She left college to begin working. 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 She notes being a "workaholic." She resides with her partner. She reports a support system of family and many friends.    Impression: Patient with adjustment difficulty associated with change in functioning due to current health concerns. Mood is reported as frustrated and compounded by pain, particularly when sitting in the wheelchair for extended times. She is a "take-charge" problem solver who is struggling with perceived loss of control in the acute rehab setting.     Psychoeducation is provided regarding the rehabilitation process, appropriate self-advocacy, flexibility, and adaptive coping with heightened stress (i.e., think before speaking, relaxation). Discussed cognitive functioning and factors which may contribute to inefficiency (i.e., pain, reduced sleep, anxiety/heightened stress). Cognitive-behavioral approach is used to address the interaction between thoughts and feelings. Support and encouragement are provided. Patient and her partner express appreciation for session.     Plan: Individual psychotherapy sessions are recommended to address adjustment and coping. Patient in agreement with plan. Family support, as needed. Neuropsychology remains available.

## 2023-10-12 NOTE — PROGRESS NOTE ADULT - SUBJECTIVE AND OBJECTIVE BOX
CC: Non-specific Interstitial Lung Disease     Today's Subjective & Objective Findings:  Patient seen and examined at bedside this AM.  Patient partner present during encounter.   Admits to poor sleep.   Visibily frustrated with current medical status/overall functioning.   Last BM on 10/12, soft, per patient.  Reassured her that we will work with nursing to ensure that her needs are appropriately addressed.   Tolerating Eliquis.   Await NPSY consult.  No other complaints at this time.     Therapy-- engaging, motivated  Working on muscle strengthening LE muscles including stretching of the gastroc/soleus muscles      Vital Signs Last 24 Hrs  T(C): 36.8 (12 Oct 2023 08:28), Max: 36.8 (12 Oct 2023 08:28)  T(F): 98.2 (12 Oct 2023 08:28), Max: 98.2 (12 Oct 2023 08:28)  HR: 80 (12 Oct 2023 08:28) (72 - 80)  BP: 98/59 (12 Oct 2023 08:28) (98/59 - 108/70)  BP(mean): --  RR: 16 (12 Oct 2023 08:28) (16 - 16)  SpO2: 97% (12 Oct 2023 08:28) (97% - 97%)      PHYSICAL EXAM:  Gen -  Comfortable, sitting on a WC, Oxy sats normal on NC oxy 3L, oxy sats  persistently > 92%  HEENT - , nostrils are moist, no bleeding, mod icterus  Neck - Supple, No limited ROM, O2 via NC  Pulm - air entry improving   Cardiovascular - RRR, S1S2  Chest - good chest expansion, good respiratory effort  Abdomen - Soft, non tender, +BS, scattered ecchymosis on abd  Extremities - No Cyanosis, no clubbing, 2+ edema to b/l feet, non pitting, no calf tenderness, LUE Med line    Neuro-     Cognitive - awake, alert, fully oriented     Communication -speech tone and volume improved      Attention: Intact     Memory:  memory intact     Cranial Nerves - CN 2-12 intact.     Motor -                    LEFT    UE - 4/5                    RIGHT UE - 4/5                     LEFT    LE - 2 +-/5 limited by leg edema                    RIGHT LE - 2+-/5  limitted by leg edema      Sensory - Intact        Reflexes - DTR 1+      Coordination - FTN  impaired  due to weakness   MSK: soreness and weakness  Psychiatric - Mood stable, Affect WNL  Skin: Left lower abdomen ruptured blister 3.0 x 1.0, stage 2 pressure injury to right buttock 4 x1 and left buttock 3x1, stage 2 pressure injury ti right inner thigh 0.2 x 0.2, right groin wound 10.5 x 2.0, Left groin wound 5 x 5,  right neck scab wound      LABS:    10-10    133<L>  |  98  |  20  ----------------------------<  240<H>  4.4   |  27  |  0.70    Ca    9.3      10 Oct 2023 11:00    TPro  5.4<L>  /  Alb  2.4<L>  /  TBili  7.5<H>  /  DBili  x   /  AST  139<H>  /  ALT  232<H>  /  AlkPhos  540<H>  10-10    Urinalysis Basic - ( 10 Oct 2023 11:00 )    Color: x / Appearance: x / SG: x / pH: x  Gluc: 240 mg/dL / Ketone: x  / Bili: x / Urobili: x   Blood: x / Protein: x / Nitrite: x   Leuk Esterase: x / RBC: x / WBC x   Sq Epi: x / Non Sq Epi: x / Bacteria: x          MEDICATIONS  (STANDING):  apixaban 5 milliGRAM(s) Oral every 12 hours  artificial  tears Solution 1 Drop(s) Both EYES every 12 hours  ascorbic acid 500 milliGRAM(s) Oral daily  atovaquone  Suspension 1500 milliGRAM(s) Oral daily  dextrose 5%. 1000 milliLiter(s) (50 mL/Hr) IV Continuous <Continuous>  dextrose 5%. 1000 milliLiter(s) (50 mL/Hr) IV Continuous <Continuous>  dextrose 5%. 1000 milliLiter(s) (100 mL/Hr) IV Continuous <Continuous>  dextrose 50% Injectable 25 Gram(s) IV Push once  dextrose 50% Injectable 25 Gram(s) IV Push once  dextrose 50% Injectable 12.5 Gram(s) IV Push once  folic acid 1 milliGRAM(s) Oral daily  glucagon  Injectable 1 milliGRAM(s) IntraMuscular once  hydrocortisone hemorrhoidal Suppository 1 Suppository(s) Rectal two times a day  influenza   Vaccine 0.5 milliLiter(s) IntraMuscular once  melatonin 6 milliGRAM(s) Oral at bedtime  methylPREDNISolone 64 milliGRAM(s) Oral daily  metoprolol tartrate 25 milliGRAM(s) Oral two times a day  multivitamin/minerals 1 Tablet(s) Oral daily  nystatin Powder 1 Application(s) Topical two times a day  pantoprazole    Tablet 40 milliGRAM(s) Oral before breakfast  psyllium Powder 1 Packet(s) Oral daily  sodium chloride 0.9% lock flush 5 milliLiter(s) IV Push two times a day  zinc oxide 40% Paste 1 Application(s) Topical three times a day  zinc sulfate 220 milliGRAM(s) Oral daily    MEDICATIONS  (PRN):  albuterol/ipratropium for Nebulization 3 milliLiter(s) Nebulizer every 6 hours PRN Shortness of Breath and/or Wheezing  aluminum hydroxide/magnesium hydroxide/simethicone Suspension 30 milliLiter(s) Oral every 4 hours PRN Dyspepsia  dextrose Oral Gel 15 Gram(s) Oral once PRN Blood Glucose LESS THAN 70 milliGRAM(s)/deciliter  loperamide 2 milliGRAM(s) Oral three times a day PRN Diarrhea  simethicone 80 milliGRAM(s) Chew four times a day PRN Gas  sodium chloride 0.65% Nasal 1 Spray(s) Both Nostrils every 8 hours PRN Nasal Congestion

## 2023-10-12 NOTE — PROGRESS NOTE ADULT - ASSESSMENT
Assessment/Plan:  ALIZA FOLEY is a 62 year old female with PMH of pAFIB and sciatica; who presented to [facility] ED with SOB, and was found to have groundglass opacities and interstitial lung disease. She was treated with empiric Vancomycin and Zosyn. She underwent a bronchoscopy with bronchoalveolar lavage and pulse steroids. She was given IV diuretics for increased pulmonary congestion, but her respiratory status continued to worsen.  On 9/13, [facility]. She was further treated with Plasmapheresis, Rituximab and high-dose steroids, with significant improvement. Her overall hospital course was complicated by thrombocytopenia (s/p several platelet transfusions), leukocytosis (infectious workup entirely negative- s/p Vanco and Ceftriaxone), AFIB with RVR (resolved with Metoprolol), dysphagia (requiring NGT), transaminitis (secondary to acute illness and hypotension),  non-occlusive RIJ DVT (started on Lovenox). Patient now admitted for a multidisciplinary rehab program. 10-05-23 @ 13:18    * Tolerating Eliquis (AFIB and RIJ DVT)   * Wound care review and recs apprecaited--Multiple wounds--perineal and buttock/sacral   * Blood draws from Left arm Midline   * Monitor BMs  * Continue Pulmonary review and recs appreciated and implemented     #Interstitial Lung Disease  - Gait Instability, ADL impairments and Functional impairments: start Comprehensive Rehab Program of PT/OT/SLP - 3 hours a day, 5 days a week  - P&O as needed   - Non-specific Interstitial Lung Disease  - Requiring ECMO   - Improvement s/p Plasmapheresis, Rituximab and high- dose steroids   - Albuterol/Ipratropium Nebulizer every 6 hours  - Mepron 1500mg daily  - PO Medrol ( due to liver dysfunction): Plan will be to taper by ~20% every 2 weeks  Medrol 64mg x 2 weeks  56mg x 2 weeks  44mg x 2 weeks   36mg x 2 weeks - Follow up Outpatient   -- Repeat CT Chest in 6 weeks   - Pulmonary following     #pAFIB/Rt Ij thrombus  - Metoprolol 25mg BID and lovenox  -- Cardiology consult--recm can switch to oral AC  --Await discussion with patient's cardiologist before switch to oral AC as requested by patient   (recent GI bleed, from hemorrhoid, resolved)      # Lymphedema both legs -chronic and Rt IJ thrombus  - ACE Wrap BL LEs  - BL LE doppler negative for DVT  - BL UE doppler with chronic thrombotic changes in RIJ   - Surg consult recs--commence anticoagulation as recs by cardiology  - Eliquis 5mg BID - tolerating well     #Transaminitis   - Secondary to acute illness and hypotension at J  - Trend LFTs  - Outpatient follow up     #Sleep  - Melatonin 6mg at HS    #Skin  - Skin: Left lower abdomen ruptured blister 3.0 x 1.0, stage 2 pressure injury to right buttock 4 x1 and left buttock 3x1, stage 2 pressure injury ti right inner thigh 0.2 x 0.2, right groin wound 10.5 x 2.0, Left groin wound 5 x 5,  right neck scab wound  -- MAD to skin folds- Nystatin to submammary and abdominal pannus BID  -- MAD to L groin- cleanse with NS, Triad cream daily  -- Mad to R groin- Nystatin powder daily   -- R groin wound-- aquacel packing, Triad to periwound, Allevyn foam- daily and PRN   - Pressure injury/Skin: OOB to Chair, PT/OT   - Offload pressure, low air loss support surfaces, T&P every 2 hours   --Wound care consult-10/9 -Multiple wounds--perineal and buttock/sacral, recs appreciated   --Suggest Continue treatments as below:   Twice daily & PRN Normal Saline Cleanse of abdominal pannus & breast folds, perineal, Left & Right inner buttock erosions.  Pat thoroughly dry.   Apply Desitin generously twice daily (& PRN) to perineal, buttocks, and left groin erosions, leave open to air.    Apply Nystatin powder to abdominal pannus and breast folds.  Suggest use of Interdry cloths in folds to 'wick' moisture.  Suggest daily Normal Saline Cleanse of right groin surgical wound, pat dry.  Apply Cavillon film barrier to intact periwound skin.  Place Aquacell strip over open area, cover with foam dressing.       #Pain Mgmt   - Tylenol PRN     #GI/Bowel Mgmt   - Pantoprazole  - Senna  --on hold due to recent Loose BM  - PRN: Maalox     #/Bladder Mgmt --voiding     #FEN   #Dysphagia  - Diet - Minced & Moist  [CC]    - Dysphaiga- SLP evaluation     #Health maintenance  - Folic Acid   - MVI  - Ocean nasal spray    # Difficulty with access to peripheral veins--  * Blood draws from Left arm Medline     #Precautions / PROPHYLAXIS:   - Falls  - ortho: Weight bearing status: WBAT   - Lungs: Aspiration, Incentive Spirometer   - DVT PPX: Eliquis 5 mg BID   ---------------------------    * Labs--stable anemia, Bld sugar normal, on tapering steroid dose  CMP today 10/10    Liaison with family  Patient's partner present during review, details of review d/w her, detailed explanation of therapy protocol explained and she was happy with same  10/9--Partner present during review and discussed details treatment plan with her    Liaison with providers  Consult recs form multiple specialities being implemented  Surgical consult and recs 10/9--agreed with plan for AC for Rt IJ chronic thrombus    10/10--No response to multiple calls to patient's private cardiologist Dr Otto Stratton  ph   Will discuss commencing oral anticoagulation with patient and commence     ---------------------------  IDT conference on 10/10  TDD: 10/27 to home vs EMIGDIO.  Barriers: Obesity, poor skin integrity, poor endurance, BL UE weakness, poor coordination, pain, incontinent x2.  Social Work: Lives with spouse in [place] 6 months of year. DC to apt in [place] with elevator, no steps. Supportive spouse.   DME: Has 02 compressor.   OT: Max A for eating/grooming. Total for all other ADLs and transfers. Goal of Mod A.   PT: Damaris 2-3pr A. Sitting EOB is max.   SLP: SBS with TLs. Mild cog. Severe aphonia.  RT: --  Functional level on DC: Min A for transfers.  ---------------------------  OUTPATIENT/FOLLOW UP:    Trae Whitney  Pulmonary Disease  [address], [address] [place] [address]  Phone: [phone]  Fax: [phone]  Follow Up Time: 2 weeks    Ryanne Allen  Rheumatology  [address] [address]  Phone: [phone]  Fax: [phone]  Follow Up Time: 1 week    Kyle Pierce  Internal Medicine  [address] [place] [address], [address]  [place] [address]  Phone: [phone]  Fax: [phone]  Follow Up Time: 2 weeks    Addy Powers  Pulmonary Disease  [address] [facility], [facility] [address]  [place], [place] [id number]  Phone: [phone]  Fax: [phone]  Follow Up Time:     Kwame Hawley  Critical Care Medicine  [address] [facility], [facility] [address]  [place] [address]  Phone: [phone]  Fax: [phone]  Follow Up Time:     Neena Borrego  Rheumatology  [address] [facility], [address]  [place] [address]  Phone: [phone]  Fax: [phone]  Follow Up Time:    [facility]  [facility] [place] [address]  Scheduled Appointment: 09/13/2023  2:40 PM  ---------------------------

## 2023-10-13 DIAGNOSIS — H61.20 IMPACTED CERUMEN, UNSPECIFIED EAR: ICD-10-CM

## 2023-10-13 DIAGNOSIS — H93.19 TINNITUS, UNSPECIFIED EAR: ICD-10-CM

## 2023-10-13 LAB
GLUCOSE BLDC GLUCOMTR-MCNC: 172 MG/DL — HIGH (ref 70–99)
GLUCOSE BLDC GLUCOMTR-MCNC: 199 MG/DL — HIGH (ref 70–99)
GLUCOSE BLDC GLUCOMTR-MCNC: 91 MG/DL — SIGNIFICANT CHANGE UP (ref 70–99)

## 2023-10-13 PROCEDURE — 99232 SBSQ HOSP IP/OBS MODERATE 35: CPT

## 2023-10-13 RX ADMIN — SODIUM CHLORIDE 5 MILLILITER(S): 9 INJECTION INTRAMUSCULAR; INTRAVENOUS; SUBCUTANEOUS at 06:49

## 2023-10-13 RX ADMIN — Medication 1 MILLIGRAM(S): at 12:57

## 2023-10-13 RX ADMIN — Medication 1 TABLET(S): at 13:36

## 2023-10-13 RX ADMIN — ZINC OXIDE 1 APPLICATION(S): 200 OINTMENT TOPICAL at 06:49

## 2023-10-13 RX ADMIN — APIXABAN 5 MILLIGRAM(S): 2.5 TABLET, FILM COATED ORAL at 06:43

## 2023-10-13 RX ADMIN — ATOVAQUONE 1500 MILLIGRAM(S): 750 SUSPENSION ORAL at 12:58

## 2023-10-13 RX ADMIN — Medication 25 MILLIGRAM(S): at 06:43

## 2023-10-13 RX ADMIN — NYSTATIN CREAM 1 APPLICATION(S): 100000 CREAM TOPICAL at 06:49

## 2023-10-13 RX ADMIN — NYSTATIN CREAM 1 APPLICATION(S): 100000 CREAM TOPICAL at 18:02

## 2023-10-13 RX ADMIN — ZINC OXIDE 1 APPLICATION(S): 200 OINTMENT TOPICAL at 13:36

## 2023-10-13 RX ADMIN — Medication 25 MILLIGRAM(S): at 18:01

## 2023-10-13 RX ADMIN — ZINC SULFATE TAB 220 MG (50 MG ZINC EQUIVALENT) 220 MILLIGRAM(S): 220 (50 ZN) TAB at 12:57

## 2023-10-13 RX ADMIN — ZINC OXIDE 1 APPLICATION(S): 200 OINTMENT TOPICAL at 21:20

## 2023-10-13 RX ADMIN — SODIUM CHLORIDE 5 MILLILITER(S): 9 INJECTION INTRAMUSCULAR; INTRAVENOUS; SUBCUTANEOUS at 18:43

## 2023-10-13 RX ADMIN — Medication 6 MILLIGRAM(S): at 21:14

## 2023-10-13 RX ADMIN — Medication 64 MILLIGRAM(S): at 06:43

## 2023-10-13 RX ADMIN — Medication 1 PACKET(S): at 18:02

## 2023-10-13 RX ADMIN — APIXABAN 5 MILLIGRAM(S): 2.5 TABLET, FILM COATED ORAL at 18:01

## 2023-10-13 RX ADMIN — Medication 500 MILLIGRAM(S): at 12:57

## 2023-10-13 NOTE — PROGRESS NOTE ADULT - SUBJECTIVE AND OBJECTIVE BOX
CC: Non-specific Interstitial Lung Disease     Today's Subjective & Objective Findings:  Patient seen and examined at bedside this AM. with her partner present  Reports good night rest, happy with the SCD in situ last night as provided by nurse manager  She had normal BM yesterday  Reports no bleeding  She is awaiting review by ENT as requested for hx of tinnitus and feeling of her ears being blocked      Therapy-- engaging, motivated  Observed during transfer from bed to , still requiring max/total assistance  Working on muscle strengthening LE muscles including stretching of the gastroc/soleus muscles  During bring period about 1-2 min, she was off oxy and oxy sat was >95%, desaturating to 80s on commencement of therapy, hence oxy NC recommenced     ICU Vital Signs Last 24 Hrs  T(C): 36.6 (13 Oct 2023 08:53), Max: 36.6 (12 Oct 2023 21:42)  T(F): 97.8 (13 Oct 2023 08:53), Max: 97.9 (12 Oct 2023 21:42)  HR: 73 (13 Oct 2023 08:53) (72 - 80)  BP: 105/72 (13 Oct 2023 08:53) (105/72 - 114/74)  RR: 16 (13 Oct 2023 08:53) (15 - 16)  SpO2: 93% (13 Oct 2023 08:53) (93% - 100%)  O2 Parameters below as of 13 Oct 2023 08:53  Patient On (Oxygen Delivery Method): room air      PHYSICAL EXAM:  Gen -  Comfortable, sitting on a WC, Oxy sats normal on NC oxy 3L, oxy sats  persistently > 92%  HEENT - , nostrils are moist, no bleeding, mod icterus  Neck - Supple, No limited ROM, O2 via NC  Pulm - good   Cardiovascular - RRR, S1S2  Chest - good chest expansion, good respiratory effort  Abdomen - Soft, non tender, +BS, scattered ecchymosis on abd  Extremities - No Cyanosis, no clubbing, 2+ edema to b/l feet, non pitting, significantly reduced    no calf tenderness, LUE Med line    Neuro-     Cognitive - awake, alert, fully oriented, engaging, speech normal      Motor -                    LEFT    UE - 4/5                    RIGHT UE - 4/5                     LEFT    LE - 2 +-/5 limited by leg edema                    RIGHT LE - 2+-/5  limitted by leg edema      Sensory - Intact        Reflexes - DTR 1+      Coordination - FTN  impaired  due to weakness   MSK: soreness and weakness  Psychiatric - Mood stable, Affect WNL  Skin: Left lower abdomen ruptured blister 3.0 x 1.0, stage 2 pressure injury to right buttock 4 x1 and left buttock 3x1, stage 2 pressure injury ti right inner thigh 0.2 x 0.2, right groin wound 10.5 x 2.0, Left groin wound 5 x 5,  right neck scab wound      LABS:    10-10    133<L>  |  98  |  20  ----------------------------<  240<H>  4.4   |  27  |  0.70    Ca    9.3      10 Oct 2023 11:00    TPro  5.4<L>  /  Alb  2.4<L>  /  TBili  7.5<H>  /  DBili  x   /  AST  139<H>  /  ALT  232<H>  /  AlkPhos  540<H>  10-10    Urinalysis Basic - ( 10 Oct 2023 11:00 )    Color: x / Appearance: x / SG: x / pH: x  Gluc: 240 mg/dL / Ketone: x  / Bili: x / Urobili: x   Blood: x / Protein: x / Nitrite: x   Leuk Esterase: x / RBC: x / WBC x   Sq Epi: x / Non Sq Epi: x / Bacteria: x          MEDICATIONS  (STANDING):  apixaban 5 milliGRAM(s) Oral every 12 hours  artificial  tears Solution 1 Drop(s) Both EYES every 12 hours  ascorbic acid 500 milliGRAM(s) Oral daily  atovaquone  Suspension 1500 milliGRAM(s) Oral daily  dextrose 5%. 1000 milliLiter(s) (50 mL/Hr) IV Continuous <Continuous>  dextrose 5%. 1000 milliLiter(s) (50 mL/Hr) IV Continuous <Continuous>  dextrose 5%. 1000 milliLiter(s) (100 mL/Hr) IV Continuous <Continuous>  dextrose 50% Injectable 25 Gram(s) IV Push once  dextrose 50% Injectable 25 Gram(s) IV Push once  dextrose 50% Injectable 12.5 Gram(s) IV Push once  folic acid 1 milliGRAM(s) Oral daily  glucagon  Injectable 1 milliGRAM(s) IntraMuscular once  hydrocortisone hemorrhoidal Suppository 1 Suppository(s) Rectal two times a day  influenza   Vaccine 0.5 milliLiter(s) IntraMuscular once  melatonin 6 milliGRAM(s) Oral at bedtime  methylPREDNISolone 64 milliGRAM(s) Oral daily  metoprolol tartrate 25 milliGRAM(s) Oral two times a day  multivitamin/minerals 1 Tablet(s) Oral daily  nystatin Powder 1 Application(s) Topical two times a day  pantoprazole    Tablet 40 milliGRAM(s) Oral before breakfast  psyllium Powder 1 Packet(s) Oral daily  sodium chloride 0.9% lock flush 5 milliLiter(s) IV Push two times a day  zinc oxide 40% Paste 1 Application(s) Topical three times a day  zinc sulfate 220 milliGRAM(s) Oral daily    MEDICATIONS  (PRN):  albuterol/ipratropium for Nebulization 3 milliLiter(s) Nebulizer every 6 hours PRN Shortness of Breath and/or Wheezing  aluminum hydroxide/magnesium hydroxide/simethicone Suspension 30 milliLiter(s) Oral every 4 hours PRN Dyspepsia  dextrose Oral Gel 15 Gram(s) Oral once PRN Blood Glucose LESS THAN 70 milliGRAM(s)/deciliter  loperamide 2 milliGRAM(s) Oral three times a day PRN Diarrhea  simethicone 80 milliGRAM(s) Chew four times a day PRN Gas  sodium chloride 0.65% Nasal 1 Spray(s) Both Nostrils every 8 hours PRN Nasal Congestion   CC: Non-specific Interstitial Lung Disease     Today's Subjective & Objective Findings:  Patient seen and examined at bedside this AM. with her partner present  Reports good night rest, happy with the SCD in situ last night as provided by nurse manager  She had normal BM yesterday  Reports no bleeding  She is awaiting review by ENT as requested for hx of tinnitus and feeling of her ears being blocked      Therapy-- engaging, motivated  Observed during transfer from bed to , still requiring max/total assistance  Working on muscle strengthening LE muscles including stretching of the gastroc/soleus muscles  During bring period about 1-2 min, she was off oxy and oxy sat was >95%, desaturating to 80s on commencement of therapy, hence oxy NC recommenced     ICU Vital Signs Last 24 Hrs  T(C): 36.6 (13 Oct 2023 08:53), Max: 36.6 (12 Oct 2023 21:42)  T(F): 97.8 (13 Oct 2023 08:53), Max: 97.9 (12 Oct 2023 21:42)  HR: 73 (13 Oct 2023 08:53) (72 - 80)  BP: 105/72 (13 Oct 2023 08:53) (105/72 - 114/74)  RR: 16 (13 Oct 2023 08:53) (15 - 16)  SpO2: 93% (13 Oct 2023 08:53) (93% - 100%)  O2 Parameters below as of 13 Oct 2023 08:53  Patient On (Oxygen Delivery Method): room air      PHYSICAL EXAM:  Gen -  Comfortable, sitting on a WC, Oxy sats normal on NC oxy 3L, oxy sats  persistently > 92%  HEENT - , nostrils are moist, no bleeding, mod icterus  Neck - Supple, No limited ROM, O2 via NC  Pulm - good   Cardiovascular - RRR, S1S2  Chest - good chest expansion, good respiratory effort  Abdomen - Soft, non tender, +BS, scattered ecchymosis on abd  Extremities - No Cyanosis, no clubbing, 2+ edema to b/l feet, non pitting, significantly reduced    no calf tenderness, LUE Med line    Neuro-     Cognitive - awake, alert, fully oriented, engaging, speech normal      Motor -                    LEFT    UE - 4/5                    RIGHT UE - 4/5                     LEFT    LE - 2 +-/5 limited by leg edema                    RIGHT LE - 2+-/5  limitted by leg edema,, which is reducing      Sensory - Intact        Reflexes - DTR 1+      Coordination - FTN  impaired  due to weakness   MSK: soreness and weakness  Psychiatric - Mood stable, Affect WNL  Skin: Left lower abdomen ruptured blister 3.0 x 1.0, stage 2 pressure injury to right buttock 4 x1 and left buttock 3x1, stage 2 pressure injury ti right inner thigh 0.2 x 0.2, right groin wound 10.5 x 2.0, Left groin wound 5 x 5,  right neck scab wound      LABS:    10-10    133<L>  |  98  |  20  ----------------------------<  240<H>  4.4   |  27  |  0.70    Ca    9.3      10 Oct 2023 11:00    TPro  5.4<L>  /  Alb  2.4<L>  /  TBili  7.5<H>  /  DBili  x   /  AST  139<H>  /  ALT  232<H>  /  AlkPhos  540<H>  10-10    Urinalysis Basic - ( 10 Oct 2023 11:00 )    Color: x / Appearance: x / SG: x / pH: x  Gluc: 240 mg/dL / Ketone: x  / Bili: x / Urobili: x   Blood: x / Protein: x / Nitrite: x   Leuk Esterase: x / RBC: x / WBC x   Sq Epi: x / Non Sq Epi: x / Bacteria: x          MEDICATIONS  (STANDING):  apixaban 5 milliGRAM(s) Oral every 12 hours  artificial  tears Solution 1 Drop(s) Both EYES every 12 hours  ascorbic acid 500 milliGRAM(s) Oral daily  atovaquone  Suspension 1500 milliGRAM(s) Oral daily  dextrose 5%. 1000 milliLiter(s) (50 mL/Hr) IV Continuous <Continuous>  dextrose 5%. 1000 milliLiter(s) (100 mL/Hr) IV Continuous <Continuous>  dextrose 50% Injectable 12.5 Gram(s) IV Push once  dextrose 50% Injectable 25 Gram(s) IV Push once  dextrose 50% Injectable 25 Gram(s) IV Push once  folic acid 1 milliGRAM(s) Oral daily  glucagon  Injectable 1 milliGRAM(s) IntraMuscular once  hydrocortisone hemorrhoidal Suppository 1 Suppository(s) Rectal two times a day  influenza   Vaccine 0.5 milliLiter(s) IntraMuscular once  melatonin 6 milliGRAM(s) Oral at bedtime  methylPREDNISolone 64 milliGRAM(s) Oral daily  metoprolol tartrate 25 milliGRAM(s) Oral two times a day  multivitamin 1 Tablet(s) Oral daily  nystatin Powder 1 Application(s) Topical two times a day  pantoprazole    Tablet 40 milliGRAM(s) Oral before breakfast  psyllium Powder 1 Packet(s) Oral daily  sodium chloride 0.9% lock flush 5 milliLiter(s) IV Push two times a day  zinc oxide 40% Paste 1 Application(s) Topical three times a day  zinc sulfate 220 milliGRAM(s) Oral daily    MEDICATIONS  (PRN):  albuterol/ipratropium for Nebulization 3 milliLiter(s) Nebulizer every 6 hours PRN Shortness of Breath and/or Wheezing  aluminum hydroxide/magnesium hydroxide/simethicone Suspension 30 milliLiter(s) Oral every 4 hours PRN Dyspepsia  dextrose Oral Gel 15 Gram(s) Oral once PRN Blood Glucose LESS THAN 70 milliGRAM(s)/deciliter  loperamide 2 milliGRAM(s) Oral three times a day PRN Diarrhea  simethicone 80 milliGRAM(s) Chew four times a day PRN Gas  sodium chloride 0.65% Nasal 1 Spray(s) Both Nostrils every 8 hours PRN Nasal Congestion

## 2023-10-13 NOTE — PROGRESS NOTE ADULT - ASSESSMENT
Physical Examination:  GENERAL:                Alert, Oriented, No acute distress.    HEENT:                    Pupils equal, reactive to light.  Symmetric. No JVD, Moist MM  PULM:                     Bilateral air entry,  poor air entry at bases No Rales, No Rhonchi, No Wheezing  CVS:                         S1, S2,  No Murmur  ABD:                        Soft, nondistended, nontender, normoactive bowel sounds,   EXT:                        Trace non pitting edema, nontender, No Cyanosis or Clubbing   Vascular:                 Warm Extremities, Normal Capillary refill, Normal Distal Pulses  NEURO:                  Alert, oriented, interactive, bilateral upper and lower extremity weakness   PSYC:                      Calm, + Insight.      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 with acute hypoxemic respiratory failure that initially responded to steroids, then with worsening after taper with development of acute hypoxemic and hypercapnic respiratory failure requiring intubation and VV- ECMO on 9/13, then transferred to [facility], concerning for DAH vs ILD flare, decannulated from VV-ECMO 9/21, extubated 9/22. S/p pulse steroids, PLEX (9/15, 9/18, 9/20) and Rituximab (9/16 & 10/3). Course c/b severe leukopenia s/p filgastrim, shock liver with slow to resolved hyperbilirubinemia, RIJ DVT, oropharyngeal dysphagia, and recurrent SVT. Repeat CT chest on 9/30 with markedly improved bilateral groundglass opacities with resolved lung consolidations. Now in acute rehab on 4 lpm NC oxygen and tapering dose of medrol.     Problem List:  1. Interstitial lung disease   2. Mixed connective tissue disease   3. Acute hypoxia  4. RIJ DVT     Plan:  - Stable respiratory status, on NC12 LPM at rest, can increase LPM with activity with goal SpO2>92%  - Cont. Methylprednisolone PO, taper as per rheumatology recs. from [facility]   - Continue atovaquone for PCP prophylaxis  - S/p Rituximab 9/16 and 10/3  - S/p PLEX during MICU stay  - Repeat CT scan in 6 weeks ~ November 11  - No evidence of active infection, monitor off antibiotics  - Cont. anticoagulation for DVT associated with ECMO cannula  - Consider incentive spirometry   - Care per primary team  - Discussed with spouse at bedside   - PT OOB as tolerated  - Outpatient pulmonary and rheumatology follow up upon discharge

## 2023-10-13 NOTE — CONSULT NOTE ADULT - SUBJECTIVE AND OBJECTIVE BOX
Patient is a 62y old  Female who presents with a chief complaint of Interstitial Lung Disease (13 Oct 2023 13:02)      Interval Events:  Following up with patient with complaints of tinnitus and ear fullness.  She reports that she has had tinnitus for some time now and it sometimes affects her hearing when it is loud.  She also reports that the ear fullness has improved but reports itching in her ears.  She notes that there are times she builds up cerumen in her ears.  Since her last visit, her voice is stronger and clearer.    REVIEW OF SYSTEMS:     CONSTITUTIONAL: No weakness, fevers or chills     EYES/ENT: No visual changes;  No vertigo or throat pain, ear fullness, ear itch      NECK: No pain or stiffness     RESPIRATORY: No cough, wheezing, hemoptysis; No shortness of breath     CARDIOVASCULAR: No chest pain or palpitations     GASTROINTESTINAL: No abdominal or epigastric pain. No nausea, vomiting, or hematemesis; No diarrhea or constipation. No melena or hematochezia.     GENITOURINARY: No dysuria, frequency or hematuria     NEUROLOGICAL: lower extremity weakness     SKIN: No itching, burning, rashes, or lesions   All other review of systems is negative unless indicated above.    MEDICATIONS  (STANDING):  apixaban 5 milliGRAM(s) Oral every 12 hours  artificial  tears Solution 1 Drop(s) Both EYES every 12 hours  ascorbic acid 500 milliGRAM(s) Oral daily  atovaquone  Suspension 1500 milliGRAM(s) Oral daily  dextrose 5%. 1000 milliLiter(s) (50 mL/Hr) IV Continuous <Continuous>  dextrose 5%. 1000 milliLiter(s) (100 mL/Hr) IV Continuous <Continuous>  dextrose 50% Injectable 12.5 Gram(s) IV Push once  dextrose 50% Injectable 25 Gram(s) IV Push once  dextrose 50% Injectable 25 Gram(s) IV Push once  folic acid 1 milliGRAM(s) Oral daily  glucagon  Injectable 1 milliGRAM(s) IntraMuscular once  hydrocortisone hemorrhoidal Suppository 1 Suppository(s) Rectal two times a day  influenza   Vaccine 0.5 milliLiter(s) IntraMuscular once  melatonin 6 milliGRAM(s) Oral at bedtime  methylPREDNISolone 64 milliGRAM(s) Oral daily  metoprolol tartrate 25 milliGRAM(s) Oral two times a day  multivitamin 1 Tablet(s) Oral daily  nystatin Powder 1 Application(s) Topical two times a day  pantoprazole    Tablet 40 milliGRAM(s) Oral before breakfast  psyllium Powder 1 Packet(s) Oral daily  sodium chloride 0.9% lock flush 5 milliLiter(s) IV Push two times a day  zinc oxide 40% Paste 1 Application(s) Topical three times a day  zinc sulfate 220 milliGRAM(s) Oral daily    MEDICATIONS  (PRN):  albuterol/ipratropium for Nebulization 3 milliLiter(s) Nebulizer every 6 hours PRN Shortness of Breath and/or Wheezing  aluminum hydroxide/magnesium hydroxide/simethicone Suspension 30 milliLiter(s) Oral every 4 hours PRN Dyspepsia  dextrose Oral Gel 15 Gram(s) Oral once PRN Blood Glucose LESS THAN 70 milliGRAM(s)/deciliter  loperamide 2 milliGRAM(s) Oral three times a day PRN Diarrhea  simethicone 80 milliGRAM(s) Chew four times a day PRN Gas  sodium chloride 0.65% Nasal 1 Spray(s) Both Nostrils every 8 hours PRN Nasal Congestion                            9.5    18.95 )-----------( 216      ( 12 Oct 2023 12:43 )             30.5     10-12    135  |  100  |  16  ----------------------------<  196<H>  4.0   |  29  |  0.45<L>    Ca    8.9      12 Oct 2023 12:43    TPro  5.0<L>  /  Alb  2.3<L>  /  TBili  6.3<H>  /  DBili  x   /  AST  64<H>  /  ALT  172<H>  /  AlkPhos  405<H>  10-12    I&O's Detail    Vital Signs Last 24 Hrs  T(C): 36.6 (13 Oct 2023 08:53), Max: 36.6 (12 Oct 2023 21:42)  T(F): 97.8 (13 Oct 2023 08:53), Max: 97.9 (12 Oct 2023 21:42)  HR: 73 (13 Oct 2023 08:53) (72 - 80)  BP: 105/72 (13 Oct 2023 08:53) (105/72 - 114/74)  BP(mean): --  RR: 16 (13 Oct 2023 08:53) (15 - 16)  SpO2: 93% (13 Oct 2023 08:53) (93% - 100%)    Parameters below as of 13 Oct 2023 08:53  Patient On (Oxygen Delivery Method): room air      CBC Full  -  ( 12 Oct 2023 12:43 )  WBC Count : 18.95 K/uL  RBC Count : 2.88 M/uL  Hemoglobin : 9.5 g/dL  Hematocrit : 30.5 %  Platelet Count - Automated : 216 K/uL  Mean Cell Volume : 105.9 fl  Mean Cell Hemoglobin : 33.0 pg  Mean Cell Hemoglobin Concentration : 31.1 gm/dL  Auto Neutrophil # : 18.00 K/uL  Auto Lymphocyte # : 0.19 K/uL  Auto Monocyte # : 0.57 K/uL  Auto Eosinophil # : 0.00 K/uL  Auto Basophil # : 0.00 K/uL  Auto Neutrophil % : 95.0 %  Auto Lymphocyte % : 1.0 %  Auto Monocyte % : 3.0 %  Auto Eosinophil % : 0.0 %  Auto Basophil % : 0.0 %        PHYSICAL EXAM:     Constitutional Normal: well nourished, well developed, non-dysmorphic, no acute distress     Psychiatric: age appropriate behavior, cooperative     Skin: no obvious skin lesions     Head: Normocephalic, Atraumatic     Lymphatic: no cervical lymphadenopathy     ENT:        Left EAC: Normal, minimal cerumen, no exostoses         Right EAC: Normal, minimal cerumen, no exostoses           Left Tympanic Membrane: Normal, Clear, No effusion        Right Tympanic Membrane: Normal, Clear, No effusion			          External Nose:  Normal, no structural deformities, no bleeding seen, O2 canula in place.     Oral Cavity:  Good dentition, tongue midline, no lesions or ulcerations, uvula midline     Neck: No palpable lymphadenopathy     Pulmonary: No Acute Distress.      CV: no peripheral edema/cyanosis     GI: nondistended, nontender     Peripheral vascular: no JVD or edema     Neurologic: awake and alert, no obvious facial weakness

## 2023-10-13 NOTE — CONSULT NOTE ADULT - PROBLEM SELECTOR RECOMMENDATION 2
-  With patient's history of being intubated, hoarseness of voice can be expected.  -  It is likely that patient's voice will recover on its own with time and ambulation  -  [facility]  -  Speech and swallow therapy  -  Will consider laryngoscopy if indicated but would prefer to hold off due to epistaxis.
- Minimal cerumen lining the wall of both ear canal  - TM white and visible.

## 2023-10-13 NOTE — PROGRESS NOTE ADULT - SUBJECTIVE AND OBJECTIVE BOX
Follow-up Pulmonary Progress Note  Chief Complaint : Interstitial lung disease    Patient seen and examined  comfortable  on 1 L n/c  remains weak but feeling better        Allergies :No Known Allergies  lactose (Unknown)      PAST MEDICAL & SURGICAL HISTORY:  Afib    Sciatica        Medications:  MEDICATIONS  (STANDING):  apixaban 5 milliGRAM(s) Oral every 12 hours  artificial  tears Solution 1 Drop(s) Both EYES every 12 hours  ascorbic acid 500 milliGRAM(s) Oral daily  atovaquone  Suspension 1500 milliGRAM(s) Oral daily  dextrose 5%. 1000 milliLiter(s) (50 mL/Hr) IV Continuous <Continuous>  dextrose 5%. 1000 milliLiter(s) (100 mL/Hr) IV Continuous <Continuous>  dextrose 50% Injectable 12.5 Gram(s) IV Push once  dextrose 50% Injectable 25 Gram(s) IV Push once  dextrose 50% Injectable 25 Gram(s) IV Push once  folic acid 1 milliGRAM(s) Oral daily  glucagon  Injectable 1 milliGRAM(s) IntraMuscular once  hydrocortisone hemorrhoidal Suppository 1 Suppository(s) Rectal two times a day  influenza   Vaccine 0.5 milliLiter(s) IntraMuscular once  melatonin 6 milliGRAM(s) Oral at bedtime  methylPREDNISolone 64 milliGRAM(s) Oral daily  metoprolol tartrate 25 milliGRAM(s) Oral two times a day  multivitamin/minerals 1 Tablet(s) Oral daily  nystatin Powder 1 Application(s) Topical two times a day  pantoprazole    Tablet 40 milliGRAM(s) Oral before breakfast  psyllium Powder 1 Packet(s) Oral daily  sodium chloride 0.9% lock flush 5 milliLiter(s) IV Push two times a day  zinc oxide 40% Paste 1 Application(s) Topical three times a day  zinc sulfate 220 milliGRAM(s) Oral daily    MEDICATIONS  (PRN):  albuterol/ipratropium for Nebulization 3 milliLiter(s) Nebulizer every 6 hours PRN Shortness of Breath and/or Wheezing  aluminum hydroxide/magnesium hydroxide/simethicone Suspension 30 milliLiter(s) Oral every 4 hours PRN Dyspepsia  dextrose Oral Gel 15 Gram(s) Oral once PRN Blood Glucose LESS THAN 70 milliGRAM(s)/deciliter  loperamide 2 milliGRAM(s) Oral three times a day PRN Diarrhea  simethicone 80 milliGRAM(s) Chew four times a day PRN Gas  sodium chloride 0.65% Nasal 1 Spray(s) Both Nostrils every 8 hours PRN Nasal Congestion      Antibiotics History  atovaquone  Suspension 1500 milliGRAM(s) Oral daily, 10-06-23 @ 00:00      Heme Medications   apixaban 5 milliGRAM(s) Oral every 12 hours, 10-11-23 @ 09:36      GI Medications  aluminum hydroxide/magnesium hydroxide/simethicone Suspension 30 milliLiter(s) Oral every 4 hours, 10-05-23 @ 18:55, Routine PRN  loperamide 2 milliGRAM(s) Oral three times a day, 10-12-23 @ 08:44, Routine PRN  pantoprazole    Tablet 40 milliGRAM(s) Oral before breakfast, 10-06-23 @ 00:00, Routine  psyllium Powder 1 Packet(s) Oral daily, 10-12-23 @ 08:45, Routine  simethicone 80 milliGRAM(s) Chew four times a day, 10-07-23 @ 23:29, STAT PRN        LABS:                        9.5    18.95 )-----------( 216      ( 12 Oct 2023 12:43 )             30.5     10-12    135  |  100  |  16  ----------------------------<  196<H>  4.0   |  29  |  0.45<L>    Ca    8.9      12 Oct 2023 12:43    TPro  5.0<L>  /  Alb  2.3<L>  /  TBili  6.3<H>  /  DBili  x   /  AST  64<H>  /  ALT  172<H>  /  AlkPhos  405<H>  10-12             VITALS:  T(C): 36.6 (10-13-23 @ 08:53), Max: 36.6 (10-12-23 @ 21:42)  T(F): 97.8 (10-13-23 @ 08:53), Max: 97.9 (10-12-23 @ 21:42)  HR: 73 (10-13-23 @ 08:53) (72 - 80)  BP: 105/72 (10-13-23 @ 08:53) (105/72 - 114/74)  BP(mean): --  ABP: --  ABP(mean): --  RR: 16 (10-13-23 @ 08:53) (15 - 16)  SpO2: 93% (10-13-23 @ 08:53) (93% - 100%)  CVP(mm Hg): --  CVP(cm H2O): --    Ins and Outs

## 2023-10-14 LAB
BASOPHILS # BLD AUTO: 0.06 K/UL — SIGNIFICANT CHANGE UP (ref 0–0.2)
BASOPHILS NFR BLD AUTO: 0.4 % — SIGNIFICANT CHANGE UP (ref 0–2)
CULTURE RESULTS: SIGNIFICANT CHANGE UP
EOSINOPHIL # BLD AUTO: 0.01 K/UL — SIGNIFICANT CHANGE UP (ref 0–0.5)
EOSINOPHIL NFR BLD AUTO: 0.1 % — SIGNIFICANT CHANGE UP (ref 0–6)
GLUCOSE BLDC GLUCOMTR-MCNC: 102 MG/DL — HIGH (ref 70–99)
HCT VFR BLD CALC: 30.1 % — LOW (ref 34.5–45)
HGB BLD-MCNC: 9.4 G/DL — LOW (ref 11.5–15.5)
IMM GRANULOCYTES NFR BLD AUTO: 3 % — HIGH (ref 0–0.9)
LYMPHOCYTES # BLD AUTO: 0.2 K/UL — LOW (ref 1–3.3)
LYMPHOCYTES # BLD AUTO: 1.2 % — LOW (ref 13–44)
MCHC RBC-ENTMCNC: 31.2 GM/DL — LOW (ref 32–36)
MCHC RBC-ENTMCNC: 32.4 PG — SIGNIFICANT CHANGE UP (ref 27–34)
MCV RBC AUTO: 103.8 FL — HIGH (ref 80–100)
MONOCYTES # BLD AUTO: 0.61 K/UL — SIGNIFICANT CHANGE UP (ref 0–0.9)
MONOCYTES NFR BLD AUTO: 3.6 % — SIGNIFICANT CHANGE UP (ref 2–14)
NEUTROPHILS # BLD AUTO: 15.68 K/UL — HIGH (ref 1.8–7.4)
NEUTROPHILS NFR BLD AUTO: 91.7 % — HIGH (ref 43–77)
NRBC # BLD: 0 /100 WBCS — SIGNIFICANT CHANGE UP (ref 0–0)
PLATELET # BLD AUTO: 231 K/UL — SIGNIFICANT CHANGE UP (ref 150–400)
RBC # BLD: 2.9 M/UL — LOW (ref 3.8–5.2)
RBC # FLD: 18.4 % — HIGH (ref 10.3–14.5)
SPECIMEN SOURCE: SIGNIFICANT CHANGE UP
WBC # BLD: 17.07 K/UL — HIGH (ref 3.8–10.5)
WBC # FLD AUTO: 17.07 K/UL — HIGH (ref 3.8–10.5)

## 2023-10-14 PROCEDURE — 99232 SBSQ HOSP IP/OBS MODERATE 35: CPT

## 2023-10-14 RX ADMIN — ZINC SULFATE TAB 220 MG (50 MG ZINC EQUIVALENT) 220 MILLIGRAM(S): 220 (50 ZN) TAB at 14:09

## 2023-10-14 RX ADMIN — APIXABAN 5 MILLIGRAM(S): 2.5 TABLET, FILM COATED ORAL at 18:12

## 2023-10-14 RX ADMIN — ATOVAQUONE 1500 MILLIGRAM(S): 750 SUSPENSION ORAL at 14:09

## 2023-10-14 RX ADMIN — Medication 6 MILLIGRAM(S): at 22:57

## 2023-10-14 RX ADMIN — Medication 64 MILLIGRAM(S): at 06:28

## 2023-10-14 RX ADMIN — SODIUM CHLORIDE 5 MILLILITER(S): 9 INJECTION INTRAMUSCULAR; INTRAVENOUS; SUBCUTANEOUS at 06:39

## 2023-10-14 RX ADMIN — NYSTATIN CREAM 1 APPLICATION(S): 100000 CREAM TOPICAL at 18:12

## 2023-10-14 RX ADMIN — NYSTATIN CREAM 1 APPLICATION(S): 100000 CREAM TOPICAL at 06:35

## 2023-10-14 RX ADMIN — APIXABAN 5 MILLIGRAM(S): 2.5 TABLET, FILM COATED ORAL at 06:28

## 2023-10-14 RX ADMIN — Medication 25 MILLIGRAM(S): at 06:28

## 2023-10-14 RX ADMIN — Medication 25 MILLIGRAM(S): at 18:12

## 2023-10-14 RX ADMIN — Medication 1 DROP(S): at 18:12

## 2023-10-14 RX ADMIN — ZINC OXIDE 1 APPLICATION(S): 200 OINTMENT TOPICAL at 06:39

## 2023-10-14 RX ADMIN — Medication 1 TABLET(S): at 14:09

## 2023-10-14 RX ADMIN — Medication 1 PACKET(S): at 18:12

## 2023-10-14 RX ADMIN — Medication 1 MILLIGRAM(S): at 14:09

## 2023-10-14 RX ADMIN — ZINC OXIDE 1 APPLICATION(S): 200 OINTMENT TOPICAL at 14:10

## 2023-10-14 RX ADMIN — Medication 1 DROP(S): at 06:35

## 2023-10-14 RX ADMIN — SODIUM CHLORIDE 5 MILLILITER(S): 9 INJECTION INTRAMUSCULAR; INTRAVENOUS; SUBCUTANEOUS at 17:23

## 2023-10-14 RX ADMIN — Medication 500 MILLIGRAM(S): at 14:09

## 2023-10-14 RX ADMIN — ZINC OXIDE 1 APPLICATION(S): 200 OINTMENT TOPICAL at 23:13

## 2023-10-14 NOTE — PROGRESS NOTE ADULT - SUBJECTIVE AND OBJECTIVE BOX
Cc: Interstitial Lung Disease    HPI:   Seen by ENT ytd.  WBC has been increasing  Also see by pulmonary ytd  Pain controlled, no chest pain, no N/V, no Fevers/Chills. No other new ROS  Has been tolerating rehabilitation program.    albuterol/ipratropium for Nebulization 3 milliLiter(s) Nebulizer every 6 hours PRN  aluminum hydroxide/magnesium hydroxide/simethicone Suspension 30 milliLiter(s) Oral every 4 hours PRN  apixaban 5 milliGRAM(s) Oral every 12 hours  artificial  tears Solution 1 Drop(s) Both EYES every 12 hours  ascorbic acid 500 milliGRAM(s) Oral daily  atovaquone  Suspension 1500 milliGRAM(s) Oral daily  dextrose 5%. 1000 milliLiter(s) IV Continuous <Continuous>  dextrose 5%. 1000 milliLiter(s) IV Continuous <Continuous>  dextrose 50% Injectable 12.5 Gram(s) IV Push once  dextrose 50% Injectable 25 Gram(s) IV Push once  dextrose 50% Injectable 25 Gram(s) IV Push once  dextrose Oral Gel 15 Gram(s) Oral once PRN  folic acid 1 milliGRAM(s) Oral daily  glucagon  Injectable 1 milliGRAM(s) IntraMuscular once  hydrocortisone hemorrhoidal Suppository 1 Suppository(s) Rectal two times a day  influenza   Vaccine 0.5 milliLiter(s) IntraMuscular once  loperamide 2 milliGRAM(s) Oral three times a day PRN  melatonin 6 milliGRAM(s) Oral at bedtime  methylPREDNISolone 64 milliGRAM(s) Oral daily  metoprolol tartrate 25 milliGRAM(s) Oral two times a day  multivitamin 1 Tablet(s) Oral daily  nystatin Powder 1 Application(s) Topical two times a day  pantoprazole    Tablet 40 milliGRAM(s) Oral before breakfast  psyllium Powder 1 Packet(s) Oral daily  SIMETHICONE SOFTGELS 250 milliGRAM(s) 250 milliGRAM(s) Oral two times a day PRN  sodium chloride 0.65% Nasal 1 Spray(s) Both Nostrils every 8 hours PRN  sodium chloride 0.9% lock flush 5 milliLiter(s) IV Push two times a day  zinc oxide 40% Paste 1 Application(s) Topical three times a day  zinc sulfate 220 milliGRAM(s) Oral daily      T(C): 36.5 (10-14-23 @ 08:35), Max: 36.7 (10-13-23 @ 21:21)  HR: 67 (10-14-23 @ 08:35) (67 - 76)  BP: 106/65 (10-14-23 @ 08:35) (101/61 - 119/73)  RR: 16 (10-14-23 @ 08:35) (15 - 16)  SpO2: 98% (10-14-23 @ 08:35) (94% - 98%)    In NAD  HEENT- EOMI  Heart- Well Perfused  Lungs- No resp distress, no use of accessory resp muscles  Neuro- Exam unchanged  Psych- Affect wnl                          9.5    18.95 )-----------( 216      ( 12 Oct 2023 12:43 )             30.5     10-12    135  |  100  |  16  ----------------------------<  196<H>  4.0   |  29  |  0.45<L>    Ca    8.9      12 Oct 2023 12:43    TPro  5.0<L>  /  Alb  2.3<L>  /  TBili  6.3<H>  /  DBili  x   /  AST  64<H>  /  ALT  172<H>  /  AlkPhos  405<H>  10-12      Urinalysis Basic - ( 12 Oct 2023 12:43 )    Color: x / Appearance: x / SG: x / pH: x  Gluc: 196 mg/dL / Ketone: x  / Bili: x / Urobili: x   Blood: x / Protein: x / Nitrite: x   Leuk Esterase: x / RBC: x / WBC x   Sq Epi: x / Non Sq Epi: x / Bacteria: x          Imp: Patient with diagnosis of     Interstitial Lung Disease     admitted for comprehensive acute rehabilitation.    Plan:  - Continue therapies  - DVT prophylaxis  - Skin- Turn q2h, check skin daily  - Continue current medications; patient medically stable.   - Patient is stable to continue current rehabilitation program.   - Leukocytosis  ENT notes appreciated from 10/13/23:  Assessment and Recommendation:    Problem/Recommendation - 1:  ·  Problem: Tinnitus.   ·  Recommendation: - Patient will need to follow up with ENT after discharge.  - Hearing test after discharge is recommended but patient reports she has already had one done.     Problem/Recommendation - 2:  ·  Problem: Excessive cerumen in ear canal.   ·  Recommendation: - Minimal cerumen lining the wall of both ear canal  - TM white and visible.     Problem/Recommendation - 3:  ·  Problem: Epistaxis.   ·  Recommendation: -  No new episodes since she was last seen  -  Continue humidified O2  -  Saline spray as needed.     Problem/Recommendation - 4:  ·  Problem: Soft hoarse voice.   ·  Recommendation: -  Improved voicing and speech.    Pulmonary appreciated from 10/13/23:    Problem List:  1. Interstitial lung disease   2. Mixed connective tissue disease   3. Acute hypoxia  4. RIJ DVT     Plan:  - Stable respiratory status, on NC12 LPM at rest, can increase LPM with activity with goal SpO2>92%  - Cont. Methylprednisolone PO, taper as per rheumatology recs. from [facility]   - Continue atovaquone for PCP prophylaxis  - S/p Rituximab 9/16 and 10/3  - S/p PLEX during MICU stay  - Repeat CT scan in 6 weeks ~ November 11  - No evidence of active infection, monitor off antibiotics  - Cont. anticoagulation for DVT associated with ECMO cannula  - Consider incentive spirometry   - Care per primary team  - Discussed with spouse at bedside   - PT OOB as tolerated  - Outpatient pulmonary and rheumatology follow up upon discharge

## 2023-10-14 NOTE — PROGRESS NOTE ADULT - ASSESSMENT
62 year old female with PMH of pAFIB and sciatica; who presented to [facility] ED with SOB, and was found to have groundglass opacities and interstitial lung disease. She was treated with empiric Vancomycin and Zosyn. She underwent a bronchoscopy with bronchoalveolar lavage and pulse steroids. She was given IV diuretics for increased pulmonary congestion, but her respiratory status continued to worsen.  On 9/13, she was transferred to [facility] for ECMO requirements. She was further treated with Plasmapheresis, Rituximab and high-dose steroids, with significant improvement. Her overall hospital course was complicated by thrombocytopenia (s/p several platelet transfusions), leukocytosis (infectious workup entirely negative- s/p Vanco and Ceftriaxone), AFIB with RVR (resolved with Metoprolol), dysphagia (requiring NGT), transaminitis (secondary to acute illness and hypotension),  non-occlusive RIJ DVT (started on Lovenox). Patient now admitted for a multidisciplinary rehab program- pt/ot/dvt ppx    Gait Instability/Functional Impairment  Interstitial Lung Disease, s/p ECMO   Mixed Connective Tissue Disease   Acute Hypoxia   -s/p Plasmapheresis, Rituximab and high- dose steroids   -Albuterol/Ipratropium Nebulizer every 6 hours PRN  -Continue PO Medrol taper- Plan will be to taper by ~20% every 2 weeks  -Continue PPx Mepron   -Repeat CT Chest in 6 weeks (~11/11)  -O2 supplementation for O2Sat >92%  -Cardiology consult appreciated   -Pulmonary following     Leukocytosis   -May be related to steroids, she has been afebrile and hemodynamically stable  -Monitor CBC    Anemia  -Likely multifactorial  -H/H stable  -Monitor CBC     pAFIB  -Continue Metoprolol   -Continue Eliquis     RIJ DVT found at ECMO cannula   -RUE duplex 10/6 showed No evidence of right upper extremity deep venous thrombosis. 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  -Continue Eliquis   -Vascular consult appreciated     Transaminitis, jaundice  -Secondary to acute illness and hypotension at [facility]  -Trend LFTs, bili-  downtrending  -Avoid hepatoxic agents   -Outpatient follow up     Tinnitus/Epistaxis   -Will need outpatient ENT follow up for tinnitus  -No further epistaxis, continue humidified O2  -ENT following    Chronic LE Lymphedema  -Bilateral LE duplex negative for DVT  -ACE wraps as tolerated  -Leg elevation encouraged     Prediabetes  -HbA1C 6.1 on 8/27  -Dietary/lifestyle modifications  -Repeat HbA1C with PMD on discharge     Sleep  -Melatonin    DVT PPx  -on Eliquis    GI PPx  -On PPI

## 2023-10-14 NOTE — PROGRESS NOTE ADULT - ASSESSMENT
From primary team notes  Assessment/Plan:  ALIZA FOLEY is a 62 year old female with PMH of pAFIB and sciatica; who presented to [facility] ED with SOB, and was found to have groundglass opacities and interstitial lung disease. She was treated with empiric Vancomycin and Zosyn. She underwent a bronchoscopy with bronchoalveolar lavage and pulse steroids. She was given IV diuretics for increased pulmonary congestion, but her respiratory status continued to worsen.  On 9/13, she was transferred to [facility] for ECMO requirements. She was further treated with Plasmapheresis, Rituximab and high-dose steroids, with significant improvement. Her overall hospital course was complicated by thrombocytopenia (s/p several platelet transfusions), leukocytosis (infectious workup entirely negative- s/p Vanco and Ceftriaxone), AFIB with RVR (resolved with Metoprolol), dysphagia (requiring NGT), transaminitis (secondary to acute illness and hypotension),  non-occlusive RIJ DVT (started on Lovenox). Patient now admitted for a multidisciplinary rehab program. 10-05-23 @ 13:18    * ENT consult for hx of tinnitus and feeling of ear fullness  * Wound care review and recs apprecaited--Multiple wounds--perineal and buttock/sacral    * Continue Pulmonary review and recs appreciated and implemented   * Elevated wbc will repeat and monitor     #Interstitial Lung Disease  - Gait Instability, ADL impairments and Functional impairments: start Comprehensive Rehab Program of PT/OT/SLP - 3 hours a day, 5 days a week  - P&O as needed   - Non-specific Interstitial Lung Disease  - Requiring ECMO   - Improvement s/p Plasmapheresis, Rituximab and high- dose steroids   - Albuterol/Ipratropium Nebulizer every 6 hours  - Mepron 1500mg daily  - PO Medrol ( due to liver dysfunction): Plan will be to taper by ~20% every 2 weeks  Medrol 64mg x 2 weeks  56mg x 2 weeks  44mg x 2 weeks   36mg x 2 weeks - Follow up Outpatient   -- Repeat CT Chest in 6 weeks   - Pulmonary following     #pAFIB/Rt Ij thrombus  - Metoprolol 25mg BID and lovenox  -- Cardiology consult--recm can switch to oral AC  --Await discussion with patient's cardiologist before switch to oral AC as requested by patient   (recent GI bleed, from hemorrhoid, resolved)    # Chronic Tinnitus   * ENT consult for hx of tinnitus and feeling of ear fullness    # Lymphedema both legs -chronic and Rt IJ thrombus  - ACE Wrap BL LEs  - BL LE doppler negative for DVT  - BL UE doppler with chronic thrombotic changes in RIJ   - Surg consult recs--commence anticoagulation as recs by cardiology  - Eliquis 5mg BID - tolerating well     #Transaminitis   - Secondary to acute illness and hypotension at J  - Trend LFTs  - Outpatient follow up     #Sleep  - Melatonin 6mg at HS    #Skin  - Skin: Left lower abdomen ruptured blister 3.0 x 1.0, stage 2 pressure injury to right buttock 4 x1 and left buttock 3x1, stage 2 pressure injury ti right inner thigh 0.2 x 0.2, right groin wound 10.5 x 2.0, Left groin wound 5 x 5,  right neck scab wound  -- MAD to skin folds- Nystatin to submammary and abdominal pannus BID  -- MAD to L groin- cleanse with NS, Triad cream daily  -- Mad to R groin- Nystatin powder daily   -- R groin wound-- aquacel packing, Triad to periwound, Allevyn foam- daily and PRN   - Pressure injury/Skin: OOB to Chair, PT/OT   - Offload pressure, low air loss support surfaces, T&P every 2 hours   --Wound care consult-10/9 -Multiple wounds--perineal and buttock/sacral, recs appreciated   --Suggest Continue treatments as below:   Twice daily & PRN Normal Saline Cleanse of abdominal pannus & breast folds, perineal, Left & Right inner buttock erosions.  Pat thoroughly dry.   Apply Desitin generously twice daily (& PRN) to perineal, buttocks, and left groin erosions, leave open to air.    Apply Nystatin powder to abdominal pannus and breast folds.  Suggest use of Interdry cloths in folds to 'wick' moisture.  Suggest daily Normal Saline Cleanse of right groin surgical wound, pat dry.  Apply Cavillon film barrier to intact periwound skin.  Place Aquacell strip over open area, cover with foam dressing.       #Pain Mgmt   - Tylenol PRN     #GI/Bowel Mgmt   - Pantoprazole  - Senna  --on hold due to recent Loose BM  - PRN: Maalox     #/Bladder Mgmt --voiding     #FEN   #Dysphagia  - Diet - Minced & Moist  [CC]    - Dysphaiga- SLP evaluation     #Health maintenance  - Folic Acid   - MVI  - Ocean nasal spray    # Difficulty with access to peripheral veins--  * Blood draws from Left arm Medline     #Precautions / PROPHYLAXIS:   - Falls  - ortho: Weight bearing status: WBAT   - Lungs: Aspiration, Incentive Spirometer   - DVT PPX: Eliquis 5 mg BID   ---------------------------    * Labs--stable anemia, Bld sugar normal, on tapering steroid dose  CMP today 10/10    Liaison with family  Patient's partner present during review, details of review d/w her, detailed explanation of therapy protocol explained and she was happy with same  10/9--Partner present during review and discussed details treatment plan with her    Liaison with providers  Consult recs form multiple specialities being implemented  Surgical consult and recs 10/9--agreed with plan for AC for Rt IJ chronic thrombus    10/10--No response to multiple calls to patient's private cardiologist Dr Otto Stratton  ph   Will discuss commencing oral anticoagulation with patient and commence     ---------------------------  IDT conference on 10/10  TDD: 10/27 to home vs EMIGDIO.  Barriers: Obesity, poor skin integrity, poor endurance, BL UE weakness, poor coordination, pain, incontinent x2.  Social Work: Lives with spouse in [place] 6 months of year. DC to apt in [place] with elevator, no steps. Supportive spouse.   DME: Has 02 compressor.   OT: Max A for eating/grooming. Total for all other ADLs and transfers. Goal of Mod A.   PT: Damaris 2-3pr A. Sitting EOB is max.   SLP: SBS with TLs. Mild cog. Severe aphonia.  RT: --  Functional level on DC: Min A for transfers.  ---------------------------  OUTPATIENT/FOLLOW UP:    Trae Whitney  Pulmonary Disease  [address] [address]  Phone: [phone]  Fax: [phone]  Follow Up Time: 2 weeks    Ryanne Allen  Rheumatology  [address] [address]  Phone: [phone]  Fax: [phone]  Follow Up Time: 1 week    Kyle Pierce  Internal Medicine  [address] [place] [address], [address]  [place] [address]  Phone: [phone]  Fax: [phone]  Follow Up Time: 2 weeks    Addy Powers  Pulmonary Disease  [address] [id number]  Phone: [phone]  Fax: [phone]  Follow Up Time:     Kwame Hawley  Critical Care Medicine  410 [facility], [address] [address]  Phone: [phone]  Fax: [phone]  Follow Up Time:     Neena Borrego  Rheumatology  [address] [place] [address], [address]  [place] [address]  Phone: [phone]  Fax: [phone]  Follow Up Time:    [facility]  [facility] [address]  Scheduled Appointment: 09/13/2023  2:40 PM  ---------------------------

## 2023-10-15 PROCEDURE — 99232 SBSQ HOSP IP/OBS MODERATE 35: CPT

## 2023-10-15 RX ORDER — SENNA PLUS 8.6 MG/1
2 TABLET ORAL AT BEDTIME
Refills: 0 | Status: DISCONTINUED | OUTPATIENT
Start: 2023-10-15 | End: 2023-10-30

## 2023-10-15 RX ORDER — POLYETHYLENE GLYCOL 3350 17 G/17G
17 POWDER, FOR SOLUTION ORAL ONCE
Refills: 0 | Status: COMPLETED | OUTPATIENT
Start: 2023-10-15 | End: 2023-10-15

## 2023-10-15 RX ADMIN — SODIUM CHLORIDE 5 MILLILITER(S): 9 INJECTION INTRAMUSCULAR; INTRAVENOUS; SUBCUTANEOUS at 06:47

## 2023-10-15 RX ADMIN — ZINC OXIDE 1 APPLICATION(S): 200 OINTMENT TOPICAL at 13:14

## 2023-10-15 RX ADMIN — Medication 6 MILLIGRAM(S): at 20:39

## 2023-10-15 RX ADMIN — POLYETHYLENE GLYCOL 3350 17 GRAM(S): 17 POWDER, FOR SOLUTION ORAL at 13:17

## 2023-10-15 RX ADMIN — ZINC OXIDE 1 APPLICATION(S): 200 OINTMENT TOPICAL at 06:46

## 2023-10-15 RX ADMIN — NYSTATIN CREAM 1 APPLICATION(S): 100000 CREAM TOPICAL at 06:47

## 2023-10-15 RX ADMIN — SODIUM CHLORIDE 5 MILLILITER(S): 9 INJECTION INTRAMUSCULAR; INTRAVENOUS; SUBCUTANEOUS at 18:44

## 2023-10-15 RX ADMIN — ATOVAQUONE 1500 MILLIGRAM(S): 750 SUSPENSION ORAL at 13:17

## 2023-10-15 RX ADMIN — Medication 1 DROP(S): at 18:43

## 2023-10-15 RX ADMIN — Medication 1 TABLET(S): at 13:17

## 2023-10-15 RX ADMIN — NYSTATIN CREAM 1 APPLICATION(S): 100000 CREAM TOPICAL at 18:44

## 2023-10-15 RX ADMIN — Medication 25 MILLIGRAM(S): at 06:45

## 2023-10-15 RX ADMIN — Medication 1 MILLIGRAM(S): at 13:17

## 2023-10-15 RX ADMIN — ZINC SULFATE TAB 220 MG (50 MG ZINC EQUIVALENT) 220 MILLIGRAM(S): 220 (50 ZN) TAB at 13:17

## 2023-10-15 RX ADMIN — APIXABAN 5 MILLIGRAM(S): 2.5 TABLET, FILM COATED ORAL at 06:44

## 2023-10-15 RX ADMIN — Medication 25 MILLIGRAM(S): at 18:00

## 2023-10-15 RX ADMIN — APIXABAN 5 MILLIGRAM(S): 2.5 TABLET, FILM COATED ORAL at 18:00

## 2023-10-15 RX ADMIN — ZINC OXIDE 1 APPLICATION(S): 200 OINTMENT TOPICAL at 20:40

## 2023-10-15 RX ADMIN — Medication 64 MILLIGRAM(S): at 06:45

## 2023-10-15 RX ADMIN — Medication 500 MILLIGRAM(S): at 13:17

## 2023-10-15 NOTE — PROGRESS NOTE ADULT - ASSESSMENT
From primary team notes  Assessment/Plan:  ALIZA FOLEY is a 62 year old female with PMH of pAFIB and sciatica; who presented to [facility] ED with SOB, and was found to have groundglass opacities and interstitial lung disease. She was treated with empiric Vancomycin and Zosyn. She underwent a bronchoscopy with bronchoalveolar lavage and pulse steroids. She was given IV diuretics for increased pulmonary congestion, but her respiratory status continued to worsen.  On 9/13, she was transferred to [facility] for ECMO requirements. She was further treated with Plasmapheresis, Rituximab and high-dose steroids, with significant improvement. Her overall hospital course was complicated by thrombocytopenia (s/p several platelet transfusions), leukocytosis (infectious workup entirely negative- s/p Vanco and Ceftriaxone), AFIB with RVR (resolved with Metoprolol), dysphagia (requiring NGT), transaminitis (secondary to acute illness and hypotension),  non-occlusive RIJ DVT (started on Lovenox). Patient now admitted for a multidisciplinary rehab program. 10-05-23 @ 13:18    * ENT consult for hx of tinnitus and feeling of ear fullness  * Wound care review and recs apprecaited--Multiple wounds--perineal and buttock/sacral    * Continue Pulmonary review and recs appreciated and implemented   * Elevated wbc will repeat and monitor     #Interstitial Lung Disease  - Gait Instability, ADL impairments and Functional impairments: start Comprehensive Rehab Program of PT/OT/SLP - 3 hours a day, 5 days a week  - P&O as needed   - Non-specific Interstitial Lung Disease  - Requiring ECMO   - Improvement s/p Plasmapheresis, Rituximab and high- dose steroids   - Albuterol/Ipratropium Nebulizer every 6 hours  - Mepron 1500mg daily  - PO Medrol ( due to liver dysfunction): Plan will be to taper by ~20% every 2 weeks  Medrol 64mg x 2 weeks  56mg x 2 weeks  44mg x 2 weeks   36mg x 2 weeks - Follow up Outpatient   -- Repeat CT Chest in 6 weeks   - Pulmonary following     #pAFIB/Rt Ij thrombus  - Metoprolol 25mg BID and lovenox  -- Cardiology consult--recm can switch to oral AC  --Await discussion with patient's cardiologist before switch to oral AC as requested by patient   (recent GI bleed, from hemorrhoid, resolved)    # Chronic Tinnitus   * ENT consult for hx of tinnitus and feeling of ear fullness    # Lymphedema both legs -chronic and Rt IJ thrombus  - ACE Wrap BL LEs  - BL LE doppler negative for DVT  - BL UE doppler with chronic thrombotic changes in RIJ   - Surg consult recs--commence anticoagulation as recs by cardiology  - Eliquis 5mg BID - tolerating well     #Transaminitis   - Secondary to acute illness and hypotension at J  - Trend LFTs  - Outpatient follow up     #Sleep  - Melatonin 6mg at HS    #Skin  - Skin: Left lower abdomen ruptured blister 3.0 x 1.0, stage 2 pressure injury to right buttock 4 x1 and left buttock 3x1, stage 2 pressure injury ti right inner thigh 0.2 x 0.2, right groin wound 10.5 x 2.0, Left groin wound 5 x 5,  right neck scab wound  -- MAD to skin folds- Nystatin to submammary and abdominal pannus BID  -- MAD to L groin- cleanse with NS, Triad cream daily  -- Mad to R groin- Nystatin powder daily   -- R groin wound-- aquacel packing, Triad to periwound, Allevyn foam- daily and PRN   - Pressure injury/Skin: OOB to Chair, PT/OT   - Offload pressure, low air loss support surfaces, T&P every 2 hours   --Wound care consult-10/9 -Multiple wounds--perineal and buttock/sacral, recs appreciated   --Suggest Continue treatments as below:   Twice daily & PRN Normal Saline Cleanse of abdominal pannus & breast folds, perineal, Left & Right inner buttock erosions.  Pat thoroughly dry.   Apply Desitin generously twice daily (& PRN) to perineal, buttocks, and left groin erosions, leave open to air.    Apply Nystatin powder to abdominal pannus and breast folds.  Suggest use of Interdry cloths in folds to 'wick' moisture.  Suggest daily Normal Saline Cleanse of right groin surgical wound, pat dry.  Apply Cavillon film barrier to intact periwound skin.  Place Aquacell strip over open area, cover with foam dressing.       #Pain Mgmt   - Tylenol PRN     #GI/Bowel Mgmt   - Pantoprazole  - Senna  --on hold due to recent Loose BM  - PRN: Maalox     #/Bladder Mgmt --voiding     #FEN   #Dysphagia  - Diet - Minced & Moist  [CC]    - Dysphaiga- SLP evaluation     #Health maintenance  - Folic Acid   - MVI  - Ocean nasal spray    # Difficulty with access to peripheral veins--  * Blood draws from Left arm Medline     #Precautions / PROPHYLAXIS:   - Falls  - ortho: Weight bearing status: WBAT   - Lungs: Aspiration, Incentive Spirometer   - DVT PPX: Eliquis 5 mg BID   ---------------------------    * Labs--stable anemia, Bld sugar normal, on tapering steroid dose  CMP today 10/10    Liaison with family  Patient's partner present during review, details of review d/w her, detailed explanation of therapy protocol explained and she was happy with same  10/9--Partner present during review and discussed details treatment plan with her    Liaison with providers  Consult recs form multiple specialities being implemented  Surgical consult and recs 10/9--agreed with plan for AC for Rt IJ chronic thrombus    10/10--No response to multiple calls to patient's private cardiologist Dr Otto Srtatton  ph   Will discuss commencing oral anticoagulation with patient and commence     ---------------------------  IDT conference on 10/10  TDD: 10/27 to home vs EMIGDIO.  Barriers: Obesity, poor skin integrity, poor endurance, BL UE weakness, poor coordination, pain, incontinent x2.  Social Work: Lives with spouse in [place] 6 months of year. DC to apt in [place] with elevator, no steps. Supportive spouse.   DME: Has 02 compressor.   OT: Max A for eating/grooming. Total for all other ADLs and transfers. Goal of Mod A.   PT: Damaris 2-3pr A. Sitting EOB is max.   SLP: SBS with TLs. Mild cog. Severe aphonia.  RT: --  Functional level on DC: Min A for transfers.  ---------------------------  OUTPATIENT/FOLLOW UP:    Trae Whitney  Pulmonary Disease  [address] [address]  Phone: [phone]  Fax: [phone]  Follow Up Time: 2 weeks    Ryanne Allen  Rheumatology  [address] [address]  Phone: [phone]  Fax: [phone]  Follow Up Time: 1 week    Kyle Pierce  Internal Medicine  [address] [place] [address], [address]  [place] [address]  Phone: [phone]  Fax: [phone]  Follow Up Time: 2 weeks    Addy Powers  Pulmonary Disease  [address] [id number]  Phone: [phone]  Fax: [phone]  Follow Up Time:     Kwame Hawley  Critical Care Medicine  410 [facility], [address] [address]  Phone: [phone]  Fax: [phone]  Follow Up Time:     Neena Borrego  Rheumatology  [address] [place] [address], [address]  [place] [address]  Phone: [phone]  Fax: [phone]  Follow Up Time:    [facility]  [facility] [address]  Scheduled Appointment: 09/13/2023  2:40 PM  ---------------------------

## 2023-10-15 NOTE — PROGRESS NOTE ADULT - SUBJECTIVE AND OBJECTIVE BOX
Cc: Interstitial Lung Disease    HPI:   Seen in AM patient is concerned and wishes to have more therapy  Pain controlled, no chest pain, no N/V, no Fevers/Chills. No other new ROS  Has been tolerating rehabilitation program.    albuterol/ipratropium for Nebulization 3 milliLiter(s) Nebulizer every 6 hours PRN  aluminum hydroxide/magnesium hydroxide/simethicone Suspension 30 milliLiter(s) Oral every 4 hours PRN  apixaban 5 milliGRAM(s) Oral every 12 hours  artificial  tears Solution 1 Drop(s) Both EYES every 12 hours  ascorbic acid 500 milliGRAM(s) Oral daily  atovaquone  Suspension 1500 milliGRAM(s) Oral daily  dextrose 5%. 1000 milliLiter(s) IV Continuous <Continuous>  dextrose 5%. 1000 milliLiter(s) IV Continuous <Continuous>  dextrose 50% Injectable 12.5 Gram(s) IV Push once  dextrose 50% Injectable 25 Gram(s) IV Push once  dextrose 50% Injectable 25 Gram(s) IV Push once  dextrose Oral Gel 15 Gram(s) Oral once PRN  folic acid 1 milliGRAM(s) Oral daily  glucagon  Injectable 1 milliGRAM(s) IntraMuscular once  hydrocortisone hemorrhoidal Suppository 1 Suppository(s) Rectal two times a day  influenza   Vaccine 0.5 milliLiter(s) IntraMuscular once  loperamide 2 milliGRAM(s) Oral three times a day PRN  melatonin 6 milliGRAM(s) Oral at bedtime  methylPREDNISolone 64 milliGRAM(s) Oral daily  metoprolol tartrate 25 milliGRAM(s) Oral two times a day  multivitamin 1 Tablet(s) Oral daily  nystatin Powder 1 Application(s) Topical two times a day  pantoprazole    Tablet 40 milliGRAM(s) Oral before breakfast  psyllium Powder 1 Packet(s) Oral daily  SIMETHICONE SOFTGELS 250 milliGRAM(s) 250 milliGRAM(s) Oral two times a day PRN  sodium chloride 0.65% Nasal 1 Spray(s) Both Nostrils every 8 hours PRN  sodium chloride 0.9% lock flush 5 milliLiter(s) IV Push two times a day  zinc oxide 40% Paste 1 Application(s) Topical three times a day  zinc sulfate 220 milliGRAM(s) Oral daily      ICU Vital Signs Last 24 Hrs  T(C): 36.8 (14 Oct 2023 21:25), Max: 36.8 (14 Oct 2023 21:25)  T(F): 98.3 (14 Oct 2023 21:25), Max: 98.3 (14 Oct 2023 21:25)  HR: 73 (15 Oct 2023 06:41) (73 - 76)  BP: 111/70 (15 Oct 2023 06:41) (104/66 - 111/70)  RR: 16 (15 Oct 2023 06:41) (16 - 16)  SpO2: 95% (15 Oct 2023 06:41) (93% - 95%)    O2 Parameters below as of 15 Oct 2023 06:41  Patient On (Oxygen Delivery Method): nasal cannula  O2 Flow (L/min): 1          In NAD  HEENT- EOMI  Heart- Well Perfused  Lungs- No resp distress, no use of accessory resp muscles  Neuro- Exam unchanged  Psych- Affect wnl                            9.4    17.07 )-----------( 231      ( 14 Oct 2023 10:10 )             30.1             Imp: Patient with diagnosis of     Interstitial Lung Disease     admitted for comprehensive acute rehabilitation.    Plan:  - Continue therapies  - DVT prophylaxis  - Skin- Turn q2h, check skin daily  - Continue current medications; patient medically stable.   - Patient is stable to continue current rehabilitation program.   - Leukocytosis  - Medicine notes appreciated from 10/14/23

## 2023-10-16 LAB
ALBUMIN SERPL ELPH-MCNC: 2.3 G/DL — LOW (ref 3.3–5)
ALP SERPL-CCNC: 367 U/L — HIGH (ref 40–120)
ALT FLD-CCNC: 150 U/L — HIGH (ref 10–45)
ANION GAP SERPL CALC-SCNC: 7 MMOL/L — SIGNIFICANT CHANGE UP (ref 5–17)
AST SERPL-CCNC: 65 U/L — HIGH (ref 10–40)
BASOPHILS # BLD AUTO: 0.03 K/UL — SIGNIFICANT CHANGE UP (ref 0–0.2)
BASOPHILS NFR BLD AUTO: 0.2 % — SIGNIFICANT CHANGE UP (ref 0–2)
BILIRUB SERPL-MCNC: 4.5 MG/DL — HIGH (ref 0.2–1.2)
BUN SERPL-MCNC: 21 MG/DL — SIGNIFICANT CHANGE UP (ref 7–23)
CALCIUM SERPL-MCNC: 9.2 MG/DL — SIGNIFICANT CHANGE UP (ref 8.4–10.5)
CHLORIDE SERPL-SCNC: 99 MMOL/L — SIGNIFICANT CHANGE UP (ref 96–108)
CO2 SERPL-SCNC: 29 MMOL/L — SIGNIFICANT CHANGE UP (ref 22–31)
CREAT SERPL-MCNC: 0.47 MG/DL — LOW (ref 0.5–1.3)
EGFR: 106 ML/MIN/1.73M2 — SIGNIFICANT CHANGE UP
EOSINOPHIL # BLD AUTO: 0.02 K/UL — SIGNIFICANT CHANGE UP (ref 0–0.5)
EOSINOPHIL NFR BLD AUTO: 0.1 % — SIGNIFICANT CHANGE UP (ref 0–6)
GLUCOSE SERPL-MCNC: 118 MG/DL — HIGH (ref 70–99)
HCT VFR BLD CALC: 30.5 % — LOW (ref 34.5–45)
HGB BLD-MCNC: 9.5 G/DL — LOW (ref 11.5–15.5)
IMM GRANULOCYTES NFR BLD AUTO: 1.5 % — HIGH (ref 0–0.9)
LYMPHOCYTES # BLD AUTO: 0.62 K/UL — LOW (ref 1–3.3)
LYMPHOCYTES # BLD AUTO: 3.8 % — LOW (ref 13–44)
MCHC RBC-ENTMCNC: 31.1 GM/DL — LOW (ref 32–36)
MCHC RBC-ENTMCNC: 32.4 PG — SIGNIFICANT CHANGE UP (ref 27–34)
MCV RBC AUTO: 104.1 FL — HIGH (ref 80–100)
MONOCYTES # BLD AUTO: 0.9 K/UL — SIGNIFICANT CHANGE UP (ref 0–0.9)
MONOCYTES NFR BLD AUTO: 5.5 % — SIGNIFICANT CHANGE UP (ref 2–14)
NEUTROPHILS # BLD AUTO: 14.42 K/UL — HIGH (ref 1.8–7.4)
NEUTROPHILS NFR BLD AUTO: 88.9 % — HIGH (ref 43–77)
NRBC # BLD: 0 /100 WBCS — SIGNIFICANT CHANGE UP (ref 0–0)
PLATELET # BLD AUTO: 232 K/UL — SIGNIFICANT CHANGE UP (ref 150–400)
POTASSIUM SERPL-MCNC: 3.7 MMOL/L — SIGNIFICANT CHANGE UP (ref 3.5–5.3)
POTASSIUM SERPL-SCNC: 3.7 MMOL/L — SIGNIFICANT CHANGE UP (ref 3.5–5.3)
PROT SERPL-MCNC: 5 G/DL — LOW (ref 6–8.3)
RBC # BLD: 2.93 M/UL — LOW (ref 3.8–5.2)
RBC # FLD: 17.8 % — HIGH (ref 10.3–14.5)
SODIUM SERPL-SCNC: 135 MMOL/L — SIGNIFICANT CHANGE UP (ref 135–145)
WBC # BLD: 16.23 K/UL — HIGH (ref 3.8–10.5)
WBC # FLD AUTO: 16.23 K/UL — HIGH (ref 3.8–10.5)

## 2023-10-16 PROCEDURE — 99233 SBSQ HOSP IP/OBS HIGH 50: CPT

## 2023-10-16 PROCEDURE — 90837 PSYTX W PT 60 MINUTES: CPT

## 2023-10-16 RX ORDER — GABAPENTIN 400 MG/1
100 CAPSULE ORAL AT BEDTIME
Refills: 0 | Status: DISCONTINUED | OUTPATIENT
Start: 2023-10-16 | End: 2023-10-23

## 2023-10-16 RX ORDER — POLYETHYLENE GLYCOL 3350 17 G/17G
17 POWDER, FOR SOLUTION ORAL DAILY
Refills: 0 | Status: DISCONTINUED | OUTPATIENT
Start: 2023-10-16 | End: 2023-12-01

## 2023-10-16 RX ADMIN — GABAPENTIN 100 MILLIGRAM(S): 400 CAPSULE ORAL at 21:28

## 2023-10-16 RX ADMIN — ZINC OXIDE 1 APPLICATION(S): 200 OINTMENT TOPICAL at 21:27

## 2023-10-16 RX ADMIN — SODIUM CHLORIDE 5 MILLILITER(S): 9 INJECTION INTRAMUSCULAR; INTRAVENOUS; SUBCUTANEOUS at 07:13

## 2023-10-16 RX ADMIN — Medication 1 MILLIGRAM(S): at 16:26

## 2023-10-16 RX ADMIN — ATOVAQUONE 1500 MILLIGRAM(S): 750 SUSPENSION ORAL at 16:25

## 2023-10-16 RX ADMIN — NYSTATIN CREAM 1 APPLICATION(S): 100000 CREAM TOPICAL at 16:42

## 2023-10-16 RX ADMIN — APIXABAN 5 MILLIGRAM(S): 2.5 TABLET, FILM COATED ORAL at 06:54

## 2023-10-16 RX ADMIN — APIXABAN 5 MILLIGRAM(S): 2.5 TABLET, FILM COATED ORAL at 16:27

## 2023-10-16 RX ADMIN — ZINC OXIDE 1 APPLICATION(S): 200 OINTMENT TOPICAL at 12:47

## 2023-10-16 RX ADMIN — SODIUM CHLORIDE 5 MILLILITER(S): 9 INJECTION INTRAMUSCULAR; INTRAVENOUS; SUBCUTANEOUS at 16:16

## 2023-10-16 RX ADMIN — Medication 6 MILLIGRAM(S): at 21:28

## 2023-10-16 RX ADMIN — Medication 1 TABLET(S): at 16:27

## 2023-10-16 RX ADMIN — ZINC OXIDE 1 APPLICATION(S): 200 OINTMENT TOPICAL at 06:55

## 2023-10-16 RX ADMIN — Medication 25 MILLIGRAM(S): at 16:29

## 2023-10-16 RX ADMIN — Medication 64 MILLIGRAM(S): at 06:54

## 2023-10-16 RX ADMIN — Medication 500 MILLIGRAM(S): at 16:26

## 2023-10-16 RX ADMIN — NYSTATIN CREAM 1 APPLICATION(S): 100000 CREAM TOPICAL at 06:55

## 2023-10-16 RX ADMIN — Medication 1 DROP(S): at 16:27

## 2023-10-16 NOTE — PROGRESS NOTE ADULT - ASSESSMENT
Assessment/Plan:  ALIZA FOLEY is a 62 year old female with PMH of pAFIB and sciatica; who presented to [facility] ED with SOB, and was found to have groundglass opacities and interstitial lung disease. She was treated with empiric Vancomycin and Zosyn. She underwent a bronchoscopy with bronchoalveolar lavage and pulse steroids. She was given IV diuretics for increased pulmonary congestion, but her respiratory status continued to worsen.  On 9/13, she was transferred to [facility] for ECMO requirements. She was further treated with Plasmapheresis, Rituximab and high-dose steroids, with significant improvement. Her overall hospital course was complicated by thrombocytopenia (s/p several platelet transfusions), leukocytosis (infectious workup entirely negative- s/p Vanco and Ceftriaxone), AFIB with RVR (resolved with Metoprolol), dysphagia (requiring NGT), transaminitis (secondary to acute illness and hypotension),  non-occlusive RIJ DVT (started on Lovenox). Patient now admitted for a multidisciplinary rehab program. 10-05-23 @ 13:18    * Wound care following  * Pulmonary following   * Start Gabapentin 100mg at HS  * Therapy- focus on BL UE stretching & strengthening, ESTIM     #Interstitial Lung Disease  - Gait Instability, ADL impairments and Functional impairments: start Comprehensive Rehab Program of PT/OT/SLP - 3 hours a day, 5 days a week  - P&O as needed   - Non-specific Interstitial Lung Disease  - Requiring ECMO   - Improvement s/p Plasmapheresis, Rituximab and high- dose steroids   - Albuterol/Ipratropium Nebulizer every 6 hours  - Mepron 1500mg daily  - PO Medrol ( due to liver dysfunction): Plan will be to taper by ~20% every 2 weeks  Medrol 64mg x 2 weeks  56mg x 2 weeks  44mg x 2 weeks   36mg x 2 weeks - Follow up Outpatient   -- Repeat CT Chest in 6 weeks   - Pulmonary following     #pAFIB/Rt Ij thrombus  - Metoprolol 25mg BID and lovenox  -- Cardiology consult--recm can switch to oral AC  --Await discussion with patient's cardiologist before switch to oral AC as requested by patient   (recent GI bleed, from hemorrhoid, resolved)    # Chronic Tinnitus   - ENT consult for hx of tinnitus and feeling of ear fullness  - ENT following, appreciate recs     # Lymphedema both legs -chronic and Rt IJ thrombus  - ACE Wrap BL LEs  - BL LE doppler negative for DVT  - BL UE doppler with chronic thrombotic changes in RIJ   - Surg consult recs--commence anticoagulation as recs by cardiology  - Eliquis 5mg BID - tolerating well     #Transaminitis   - Secondary to acute illness and hypotension at [facility]  - Trend LFTs  - Outpatient follow up     #Sleep  - Melatonin 6mg at HS    #Skin  - Skin: Left lower abdomen ruptured blister 3.0 x 1.0, stage 2 pressure injury to right buttock 4 x1 and left buttock 3x1, stage 2 pressure injury ti right inner thigh 0.2 x 0.2, right groin wound 10.5 x 2.0, Left groin wound 5 x 5,  right neck scab wound  -- MAD to skin folds- Nystatin to submammary and abdominal pannus BID  -- MAD to L groin- cleanse with NS, Triad cream daily  -- Mad to R groin- Nystatin powder daily   -- R groin wound-- aquacel packing, Triad to periwound, Allevyn foam- daily and PRN   - Pressure injury/Skin: OOB to Chair, PT/OT   - Offload pressure, low air loss support surfaces, T&P every 2 hours   --Wound care consult-10/9 -Multiple wounds--perineal and buttock/sacral, recs appreciated   --Suggest Continue treatments as below:   Twice daily & PRN Normal Saline Cleanse of abdominal pannus & breast folds, perineal, Left & Right inner buttock erosions.  Pat thoroughly dry.   Apply Desitin generously twice daily (& PRN) to perineal, buttocks, and left groin erosions, leave open to air.    Apply Nystatin powder to abdominal pannus and breast folds.  Suggest use of Interdry cloths in folds to 'wick' moisture.  Suggest daily Normal Saline Cleanse of right groin surgical wound, pat dry.  Apply Cavillon film barrier to intact periwound skin.  Place Aquacell strip over open area, cover with foam dressing.  - Wound care following        #Pain Mgmt   - Tylenol PRN   - Start Gabapentin 100mg at HS     #GI/Bowel Mgmt   - Pantoprazole  - Senna  --on hold due to recent Loose BM  - PRN: Maalox     #/Bladder Mgmt --voiding     #FEN   #Dysphagia  - Diet - Minced & Moist  [CC]    - Dysphaiga- SLP evaluation     #Health maintenance  - Folic Acid   - MVI  - Ocean nasal spray    # Difficulty with access to peripheral veins--  * Blood draws from Left arm Medline     #Precautions / PROPHYLAXIS:   - Falls  - ortho: Weight bearing status: WBAT   - Lungs: Aspiration, Incentive Spirometer   - DVT PPX: Eliquis 5 mg BID   ---------------------------    * Labs--stable anemia, Bld sugar normal, on tapering steroid dose  CMP today 10/10    Liaison with family  Patient's partner present during review, details of review d/w her, detailed explanation of therapy protocol explained and she was happy with same  10/9--Partner present during review and discussed details treatment plan with her    Liaison with providers  Consult recs form multiple specialities being implemented  Surgical consult and recs 10/9--agreed with plan for AC for Rt IJ chronic thrombus    10/10--No response to multiple calls to patient's private cardiologist Dr Otto Stratton  ph   Will discuss commencing oral anticoagulation with patient and commence     ---------------------------  IDT conference on 10/10  TDD: 10/27 to home vs EMIGDIO.  Barriers: Obesity, poor skin integrity, poor endurance, BL UE weakness, poor coordination, pain, incontinent x2.  Social Work: Lives with spouse in [place] 6 months of year. DC to apt in [place] with elevator, no steps. Supportive spouse.   DME: Has 02 compressor.   OT: Max A for eating/grooming. Total for all other ADLs and transfers. Goal of Mod A.   PT: Damaris 2-3pr A. Sitting EOB is max.   SLP: SBS with TLs. Mild cog. Severe aphonia.  RT: --  Functional level on DC: Min A for transfers.  ---------------------------  OUTPATIENT/FOLLOW UP:    Trae Whitney  Pulmonary Disease  [address], [address] [place] [id number]  Phone: [phone]  Fax: [phone]  Follow Up Time: 2 weeks    Ryanne Allen  Rheumatology  [address] [address]  Phone: [phone]  Fax: [phone]  Follow Up Time: 1 week    Kyle Pierce  Internal Medicine  [address] [place] [address], [address]  [place] [address]  Phone: [phone]  Fax: [phone]  Follow Up Time: 2 weeks    Addy Powers  Pulmonary Disease  [address] [facility], [facility] [address]  [place], [place] [id number]  Phone: [phone]  Fax: [phone]  Follow Up Time:     Kwame Hawley  Critical Care Medicine  [address] [facility], [facility] [address]  [place] [address]  Phone: [phone]  Fax: [phone]  Follow Up Time:     Neena Borrego  Rheumatology  [address] [facility], Floor [address]  [place] [address]  Phone: [phone]  Fax: [phone]  Follow Up Time:    [facility]  [facility] [address]  Scheduled Appointment: 09/13/2023  2:40 PM  ---------------------------   Assessment/Plan:  ALIZA FOLEY is a 62 year old female with PMH of pAFIB and sciatica; who presented to [facility] ED with SOB, and was found to have groundglass opacities and interstitial lung disease. She was treated with empiric Vancomycin and Zosyn. She underwent a bronchoscopy with bronchoalveolar lavage and pulse steroids. She was given IV diuretics for increased pulmonary congestion, but her respiratory status continued to worsen.  On 9/13, she was transferred to [facility] for ECMO requirements. She was further treated with Plasmapheresis, Rituximab and high-dose steroids, with significant improvement. Her overall hospital course was complicated by thrombocytopenia (s/p several platelet transfusions), leukocytosis (infectious workup entirely negative- s/p Vanco and Ceftriaxone), AFIB with RVR (resolved with Metoprolol), dysphagia (requiring NGT), transaminitis (secondary to acute illness and hypotension),  non-occlusive RIJ DVT (started on Lovenox). Patient now admitted for a multidisciplinary rehab program. 10-05-23 @ 13:18    * Labs unremarkable,,stable anemia, LFT elevated, but downtrending, Fluctuating leucocytosis, asymptomatic   * Wound care following  * Reducing dose of steroid  * Pulmonary following   * Start Gabapentin 100mg at HS  * Therapy- focus on BL UE stretching & strengthening, ESTIM     #Interstitial Lung Disease  - Gait Instability, ADL impairments and Functional impairments: start Comprehensive Rehab Program of PT/OT/SLP - 3 hours a day, 5 days a week  - P&O as needed   - Non-specific Interstitial Lung Disease  - Requiring ECMO   - Improvement s/p Plasmapheresis, Rituximab and high- dose steroids   - Albuterol/Ipratropium Nebulizer every 6 hours  - Mepron 1500mg daily  - PO Medrol ( due to liver dysfunction): Plan will be to taper by ~20% every 2 weeks  Medrol 64mg x 2 weeks  56mg x 2 weeks  44mg x 2 weeks   36mg x 2 weeks - Follow up Outpatient   -- Repeat CT Chest in 6 weeks   - Pulmonary following     #pAFIB/Rt Ij thrombus  - Metoprolol 25mg BID and lovenox  -- Cardiology consult--recm can switch to oral AC  --Await discussion with patient's cardiologist before switch to oral AC as requested by patient   (recent GI bleed, from hemorrhoid, resolved)    # Chronic Tinnitus   - ENT consult for hx of tinnitus and feeling of ear fullness  - ENT review and recs appreciate     # Lymphedema both legs -chronic and Rt IJ thrombus  - ACE Wrap BL LEs  - BL LE doppler negative for DVT  - BL UE doppler with chronic thrombotic changes in RIJ   - Surg consult recs--commence anticoagulation as recs by cardiology  - Eliquis 5mg BID - tolerating well     #Transaminitis --LFT Down trending   - Secondary to acute illness and hypotension at [facility]  - Trend LFTs  - Outpatient follow up     * Neuropathy  --commenced trial of gabapentin 10/16 and E stim  --Work on motor strengthening, priority for UE as preferred by patient     #Sleep  - Melatonin 6mg at HS    #Skin  - Skin: Left lower abdomen ruptured blister 3.0 x 1.0, stage 2 pressure injury to right buttock 4 x1 and left buttock 3x1, stage 2 pressure injury ti right inner thigh 0.2 x 0.2, right groin wound 10.5 x 2.0, Left groin wound 5 x 5,  right neck scab wound  -- MAD to skin folds- Nystatin to submammary and abdominal pannus BID  -- MAD to L groin- cleanse with NS, Triad cream daily  -- Mad to R groin- Nystatin powder daily   -- R groin wound-- aquacel packing, Triad to periwound, Allevyn foam- daily and PRN   - Pressure injury/Skin: OOB to Chair, PT/OT   - Offload pressure, low air loss support surfaces, T&P every 2 hours   --Wound care consult-10/9 -Multiple wounds--perineal and buttock/sacral, recs appreciated   --Suggest Continue treatments as below:   Twice daily & PRN Normal Saline Cleanse of abdominal pannus & breast folds, perineal, Left & Right inner buttock erosions.  Pat thoroughly dry.   Apply Desitin generously twice daily (& PRN) to perineal, buttocks, and left groin erosions, leave open to air.    Apply Nystatin powder to abdominal pannus and breast folds.  Suggest use of Interdry cloths in folds to 'wick' moisture.  Suggest daily Normal Saline Cleanse of right groin surgical wound, pat dry.  Apply Cavillon film barrier to intact periwound skin.  Place Aquacell strip over open area, cover with foam dressing.  - Wound care following        #Pain Mgmt   - Tylenol PRN   - Start Gabapentin 100mg at HS     #GI/Bowel Mgmt   - Pantoprazole  - Senna  --on hold due to recent Loose BM  - PRN: Maalox     #/Bladder Mgmt --voiding     #FEN   #Dysphagia  - Diet - Minced & Moist  [CC]    - Dysphaiga- SLP evaluation     #Health maintenance  - Folic Acid   - MVI  - Ocean nasal spray    # Difficulty with access to peripheral veins--  * Blood draws from Left arm Medline     #Precautions / PROPHYLAXIS:   - Falls  - ortho: Weight bearing status: WBAT   - Lungs: Aspiration, Incentive Spirometer   - DVT PPX: Eliquis 5 mg BID   ---------------------------    * Labs unremarkable,,stable anemia, LFT elevated, but downtrending, fluctuating leucocytosis, asymptomatic ,       Liaison with family  Patient's partner present during review, details of review d/w her, detailed explanation of therapy protocol explained and she was happy with same  10/9--Partner present during review and discussed details treatment plan with her    Liaison with providers  Consult recs form multiple specialities being implemented  Surgical consult and recs 10/9--agreed with plan for AC for Rt IJ chronic thrombus    10/10--No response to multiple calls to patient's private cardiologist Dr Otto Stratton  ph   Will discuss commencing oral anticoagulation with patient and commence     ---------------------------  IDT conference on 10/10  TDD: 10/27 to home vs EMIGDIO.  Barriers: Obesity, poor skin integrity, poor endurance, BL UE weakness, poor coordination, pain, incontinent x2.  Social Work: Lives with spouse in [place] 6 months of year. DC to apt in [place] with elevator, no steps. Supportive spouse.   DME: Has 02 compressor.   OT: Max A for eating/grooming. Total for all other ADLs and transfers. Goal of Mod A.   PT: Damaris 2-3pr A. Sitting EOB is max.   SLP: SBS with TLs. Mild cog. Severe aphonia.  RT: --n/a   Functional level on DC: Min A for transfers.  ---------------------------  OUTPATIENT/FOLLOW UP:    Chelo Trae  Pulmonary Disease  [address], [address] [place] [address]  Phone: [phone]  Fax: [phone]  Follow Up Time: 2 weeks    Ryanne Allen  Rheumatology  [address] [address]  Phone: [phone]  Fax: [phone]  Follow Up Time: 1 week    Kyle Pierce  Internal Medicine  [address] [place] [address], [address]  [place] [address]  Phone: [phone]  Fax: [phone]  Follow Up Time: 2 weeks    Addy Powers  Pulmonary Disease  [address] [facility], [address] [id number]  Phone: [phone]  Fax: [phone]  Follow Up Time:     Kwame Hawley  Critical Care Medicine  [address] [facility], [address] [address]  Phone: [phone]  Fax: [phone]  Follow Up Time:     Neena Borrego  Rheumatology  [address] [facility], [address]  [place] [address]  Phone: [phone]  Fax: [phone]  Follow Up Time:    [facility]  [facility] [address]  Scheduled Appointment: 09/13/2023  2:40 PM  ---------------------------

## 2023-10-16 NOTE — CHART NOTE - NSCHARTNOTEFT_GEN_A_CORE
NUTRITION Follow Up Note    SOURCE: Patient [X]  Medical Record [X]  Nursing Staff [X]  Family Member []    DIET: Diet, Consistent Carbohydrate/No Snacks:   Darinel(7 Gm Arginine/7 Gm Glut/1.2 Gm HMB     Qty per Day:  BID (10-13-23 @ 13:30) [Active]    Patient noted with good appetite, consuming % of meals as per nursing flow sheets. Continues with Darinel 1 Packet BID (Provides 180kcal & 28gm of Protein + 14gm Arginine, 14gm Glutamine, & 2.4gm HMB, 300mg Vitamin C, 9.5mg Zinc) and MVI & ascorbic acid (500 mg) to promote wound healing. Completed 10 days of Zinc Sulfate (220 mg) on 10/15/23. Steroids noted. POCT reviewed. [facility]     EDEMA:  +3 b/l leg    LAST BM: R inner thigh stage II, R inner buttock stage II, L inner buttock stage II Per nursing flowsheets     SKIN: no pressure injuries noted    WEIGHT TRENDS: 116.4 kG (10/5/23)      PERTINENT MEDICATIONS: MEDICATIONS  (STANDING):  apixaban 5 milliGRAM(s) Oral every 12 hours  artificial  tears Solution 1 Drop(s) Both EYES every 12 hours  ascorbic acid 500 milliGRAM(s) Oral daily  atovaquone  Suspension 1500 milliGRAM(s) Oral daily  dextrose 5%. 1000 milliLiter(s) (50 mL/Hr) IV Continuous <Continuous>  dextrose 5%. 1000 milliLiter(s) (100 mL/Hr) IV Continuous <Continuous>  dextrose 50% Injectable 12.5 Gram(s) IV Push once  dextrose 50% Injectable 25 Gram(s) IV Push once  dextrose 50% Injectable 25 Gram(s) IV Push once  folic acid 1 milliGRAM(s) Oral daily  gabapentin 100 milliGRAM(s) Oral at bedtime  glucagon  Injectable 1 milliGRAM(s) IntraMuscular once  hydrocortisone hemorrhoidal Suppository 1 Suppository(s) Rectal two times a day  influenza   Vaccine 0.5 milliLiter(s) IntraMuscular once  melatonin 6 milliGRAM(s) Oral at bedtime  methylPREDNISolone 64 milliGRAM(s) Oral daily  metoprolol tartrate 25 milliGRAM(s) Oral two times a day  multivitamin 1 Tablet(s) Oral daily  nystatin Powder 1 Application(s) Topical two times a day  pantoprazole    Tablet 40 milliGRAM(s) Oral before breakfast  psyllium Powder 1 Packet(s) Oral daily  senna 2 Tablet(s) Oral at bedtime  sodium chloride 0.9% lock flush 5 milliLiter(s) IV Push two times a day  zinc oxide 40% Paste 1 Application(s) Topical three times a day    MEDICATIONS  (PRN):  albuterol/ipratropium for Nebulization 3 milliLiter(s) Nebulizer every 6 hours PRN Shortness of Breath and/or Wheezing  aluminum hydroxide/magnesium hydroxide/simethicone Suspension 30 milliLiter(s) Oral every 4 hours PRN Dyspepsia  dextrose Oral Gel 15 Gram(s) Oral once PRN Blood Glucose LESS THAN 70 milliGRAM(s)/deciliter  loperamide 2 milliGRAM(s) Oral three times a day PRN Diarrhea  polyethylene glycol 3350 17 Gram(s) Oral daily PRN Constipation  SIMETHICONE SOFTGELS 250 milliGRAM(s) 250 milliGRAM(s) Oral three times a day PRN for gas  sodium chloride 0.65% Nasal 1 Spray(s) Both Nostrils every 8 hours PRN Nasal Congestion      PERTINENT LABS:  10-16 Na135 mmol/L Glu 118 mg/dL<H> K+ 3.7 mmol/L Cr  0.47 mg/dL<L> BUN 21 mg/dL 10-16 Alb 2.3 g/dL<L> 09-25 Chol --    LDL --    HDL --    Trig 199 mg/dL<H>      ESTIMATED NEEDS:   [X] No Change Since Previous Assessment    PREVIOUS NUTRITION DIAGNOSIS:  1.Severe Protein Calorie Malnutrition  2. Increased Nutrient Needs    NUTRITION DIAGNOSIS IS [X] Ongoing - Addressed with Darinel 1 Packet BID (Provides 180kcal & 28gm of Protein + 14gm Arginine, 14gm Glutamine, & 2.4gm HMB, 300mg Vitamin C, 9.5mg Zinc)    NEW NUTRITION DIAGNOSIS: [X] Not Applicable    INTERVENTIONS:   1. Recommend continue current nutrition plan of care as tolerated   2 [place] patients daily food preferences as feasible with prescribed diet   3. Monitor daily PO intakes, GI tolerance, labs, weights, skin integrity, & BM regularity     MONITORING & EVALUATION:   1. Weights   2. PO intakes   3. Skin integrity   4. Tolerance to diet prescription   5. Labs & POCT  6. Follow up (per protocol)    Registered Dietitian/Nutritionist Remains Available.  Tenzin Mcintyre RD, CDN    Contact: [phone] or via [place] TEAMS

## 2023-10-16 NOTE — PROGRESS NOTE ADULT - SUBJECTIVE AND OBJECTIVE BOX
CC: Non-specific Interstitial Lung Disease     Today's Subjective & Objective Findings:  Patient seen and examined at bedside this AM with Dr ROBLES and medical student.  Partner present during encounter.   Admits to intermittent sleep due to thoughts/anxiety.  Last BM on 10/14, per chart review.   Discussed rehab progress and likely need for EMIGDIO in near future.  Discussed starting anh for BL hand and BL feet numbness/tingling.   No other complaints at this time.      Therapy-- engaging, motivated  Observed during transfer from bed to , still requiring max/total assistance  Working on muscle strengthening LE muscles including stretching of the gastroc/soleus muscles  During bring period about 1-2 min, she was off oxy and oxy sat was >95%, desaturating to 80s on commencement of therapy, hence oxy NC recommenced   Patient prefers to focus on BL UE stretching/strengthening and ESTIM.       Vital Signs Last 24 Hrs  T(C): 36.6 (16 Oct 2023 08:11), Max: 36.6 (16 Oct 2023 08:11)  T(F): 97.8 (16 Oct 2023 08:11), Max: 97.8 (16 Oct 2023 08:11)  HR: 72 (16 Oct 2023 08:11) (72 - 78)  BP: 98/64 (16 Oct 2023 08:11) (98/64 - 121/79)  BP(mean): --  RR: 16 (16 Oct 2023 08:11) (16 - 16)  SpO2: 96% (16 Oct 2023 08:11) (96% - 96%)air      PHYSICAL EXAM:  Gen -  Comfortable, sitting on a WC, Oxy sats normal on NC oxy 3L, oxy sats  persistently > 92%  HEENT - , nostrils are moist, no bleeding, mod icterus  Neck - Supple, No limited ROM, O2 via NC  Pulm - good   Cardiovascular - RRR, S1S2  Chest - good chest expansion, good respiratory effort  Abdomen - Soft, non tender, +BS, scattered ecchymosis on abd  Extremities - No Cyanosis, no clubbing, 2+ edema to b/l feet, non pitting, significantly reduced    no calf tenderness, LUE Med line    Neuro-     Cognitive - awake, alert, fully oriented, engaging, speech normal      Motor -                    LEFT    UE - 4/5                    RIGHT UE - 4/5                     LEFT    LE - 2 +-/5 limited by leg edema                    RIGHT LE - 2+-/5  limitted by leg edema,, which is reducing      Sensory - Intact        Reflexes - DTR 1+      Coordination - FTN  impaired  due to weakness   MSK: soreness and weakness  Psychiatric - Mood stable, Affect WNL  Skin: Left lower abdomen ruptured blister 3.0 x 1.0, stage 2 pressure injury to right buttock 4 x1 and left buttock 3x1, stage 2 pressure injury ti right inner thigh 0.2 x 0.2, right groin wound 10.5 x 2.0, Left groin wound 5 x 5,  right neck scab wound      LABS:                        9.5    16.23 )-----------( 232      ( 16 Oct 2023 06:07 )             30.5     10-16    135  |  99  |  21  ----------------------------<  118<H>  3.7   |  29  |  0.47<L>    Ca    9.2      16 Oct 2023 06:07    TPro  5.0<L>  /  Alb  2.3<L>  /  TBili  4.5<H>  /  DBili  x   /  AST  65<H>  /  ALT  150<H>  /  AlkPhos  367<H>  10-16      Urinalysis Basic - ( 16 Oct 2023 06:07 )    Color: x / Appearance: x / SG: x / pH: x  Gluc: 118 mg/dL / Ketone: x  / Bili: x / Urobili: x   Blood: x / Protein: x / Nitrite: x   Leuk Esterase: x / RBC: x / WBC x   Sq Epi: x / Non Sq Epi: x / Bacteria: x      MEDICATIONS  (STANDING):  apixaban 5 milliGRAM(s) Oral every 12 hours  artificial  tears Solution 1 Drop(s) Both EYES every 12 hours  ascorbic acid 500 milliGRAM(s) Oral daily  atovaquone  Suspension 1500 milliGRAM(s) Oral daily  dextrose 5%. 1000 milliLiter(s) (50 mL/Hr) IV Continuous <Continuous>  dextrose 5%. 1000 milliLiter(s) (100 mL/Hr) IV Continuous <Continuous>  dextrose 50% Injectable 12.5 Gram(s) IV Push once  dextrose 50% Injectable 25 Gram(s) IV Push once  dextrose 50% Injectable 25 Gram(s) IV Push once  folic acid 1 milliGRAM(s) Oral daily  gabapentin 100 milliGRAM(s) Oral at bedtime  glucagon  Injectable 1 milliGRAM(s) IntraMuscular once  hydrocortisone hemorrhoidal Suppository 1 Suppository(s) Rectal two times a day  influenza   Vaccine 0.5 milliLiter(s) IntraMuscular once  melatonin 6 milliGRAM(s) Oral at bedtime  methylPREDNISolone 64 milliGRAM(s) Oral daily  metoprolol tartrate 25 milliGRAM(s) Oral two times a day  multivitamin 1 Tablet(s) Oral daily  nystatin Powder 1 Application(s) Topical two times a day  pantoprazole    Tablet 40 milliGRAM(s) Oral before breakfast  psyllium Powder 1 Packet(s) Oral daily  senna 2 Tablet(s) Oral at bedtime  sodium chloride 0.9% lock flush 5 milliLiter(s) IV Push two times a day  zinc oxide 40% Paste 1 Application(s) Topical three times a day    MEDICATIONS  (PRN):  albuterol/ipratropium for Nebulization 3 milliLiter(s) Nebulizer every 6 hours PRN Shortness of Breath and/or Wheezing  aluminum hydroxide/magnesium hydroxide/simethicone Suspension 30 milliLiter(s) Oral every 4 hours PRN Dyspepsia  dextrose Oral Gel 15 Gram(s) Oral once PRN Blood Glucose LESS THAN 70 milliGRAM(s)/deciliter  loperamide 2 milliGRAM(s) Oral three times a day PRN Diarrhea  polyethylene glycol 3350 17 Gram(s) Oral daily PRN Constipation  SIMETHICONE SOFTGELS 250 milliGRAM(s) 250 milliGRAM(s) Oral two times a day PRN for gas  sodium chloride 0.65% Nasal 1 Spray(s) Both Nostrils every 8 hours PRN Nasal Congestion   CC: Non-specific Interstitial Lung Disease     Today's Subjective & Objective Findings:  Patient seen and examined at bedside this AM with Dr López and medical student.  Partner present during encounter.   Admits to intermittent sleep due to thoughts/anxiety.  Reports that she her immediate rehab goal is to improve UE motor strength to enable her scratch her hair and improved her fine motor activity  She asked for a projection of how long it would take for her to achieve this, but we told her that its difficult to determine. However considering that her current UE motor function is 2/5, it would take beyond 1 wk and she would continue to need therapy in EMIGDIO post discharge  Discussed rehab progress and likely need for EMIGDIO in near future.  We discussed possible options for EMIGDIO and she plans to start working on this    Discussed starting trial of gabapentin and e stimulation for BL hand and BL feet numbness/tingling.   No other complaints at this time.      ROS   Therapy-- engaging, motivated  Progress  with motor function  is minimal, and often frustrating to patient  However leg swelling significantly reduced, ankle strength improving      Observed during transfer from bed to , still requiring max/total assistance  Working on muscle strengthening LE muscles including stretching of the gastroc/soleus muscles  During bring period about 1-2 min, she was off oxy and oxy sat was >95%, desaturating to 80s on commencement of therapy, hence oxy NC recommenced   Patient prefers to focus on BL UE stretching/strengthening and ESTIM.       Vital Signs Last 24 Hrs  T(C): 36.6 (16 Oct 2023 08:11), Max: 36.6 (16 Oct 2023 08:11)  T(F): 97.8 (16 Oct 2023 08:11), Max: 97.8 (16 Oct 2023 08:11)  HR: 72 (16 Oct 2023 08:11) (72 - 78)  BP: 98/64 (16 Oct 2023 08:11) (98/64 - 121/79)  RR: 16 (16 Oct 2023 08:11) (16 - 16)  SpO2: 96% (16 Oct 2023 08:11) (96% - 96%)air      PHYSICAL EXAM:  Gen -  Comfortable, sitting on a WC, Oxy sats normal on NC oxy 3L, oxy sats  persistently > 92%  HEENT - , nostrils are moist, no bleeding, mod icterus  Neck - Supple, No limited ROM, O2 via NC  Pulm - good   Cardiovascular - RRR, S1S2  Chest - good chest expansion, good respiratory effort  Abdomen - Soft, non tender, +BS, scattered ecchymosis on abd  Extremities - No Cyanosis, no clubbing, 1+ edema to b/l feet, non pitting,    no calf tenderness, LUE Med line    Neuro-     Cognitive - awake, alert, fully oriented, engaging, speech normal      Motor -                    LEFT    UE - 4/5                    RIGHT UE - 4/5                     LEFT    LE - 2 +-/5 limited by leg edema                    RIGHT LE - 2+-/5  limitted by leg edema,, which is reducing      Sensory - Intact        Reflexes - DTR 1+      Coordination - FTN  impaired  due to weakness   MSK: soreness and weakness  Psychiatric - Mood stable, Affect WNL  Skin: Left lower abdomen ruptured blister 3.0 x 1.0, stage 2 pressure injury to right buttock 4 x1 and left buttock 3x1, stage 2 pressure injury ti right inner thigh 0.2 x 0.2, right groin wound 10.5 x 2.0, Left groin wound 5 x 5,        LABS:                        9.5    16.23 )-----------( 232      ( 16 Oct 2023 06:07 )             30.5     10-16    135  |  99  |  21  ----------------------------<  118<H>  3.7   |  29  |  0.47<L>    Ca    9.2      16 Oct 2023 06:07    TPro  5.0<L>  /  Alb  2.3<L>  /  TBili  4.5<H>  /  DBili  x   /  AST  65<H>  /  ALT  150<H>  /  AlkPhos  367<H>  10-16      Urinalysis Basic - ( 16 Oct 2023 06:07 )    Color: x / Appearance: x / SG: x / pH: x  Gluc: 118 mg/dL / Ketone: x  / Bili: x / Urobili: x   Blood: x / Protein: x / Nitrite: x   Leuk Esterase: x / RBC: x / WBC x   Sq Epi: x / Non Sq Epi: x / Bacteria: x      MEDICATIONS  (STANDING):  apixaban 5 milliGRAM(s) Oral every 12 hours  artificial  tears Solution 1 Drop(s) Both EYES every 12 hours  ascorbic acid 500 milliGRAM(s) Oral daily  atovaquone  Suspension 1500 milliGRAM(s) Oral daily  dextrose 5%. 1000 milliLiter(s) (50 mL/Hr) IV Continuous <Continuous>  dextrose 5%. 1000 milliLiter(s) (100 mL/Hr) IV Continuous <Continuous>  dextrose 50% Injectable 12.5 Gram(s) IV Push once  dextrose 50% Injectable 25 Gram(s) IV Push once  dextrose 50% Injectable 25 Gram(s) IV Push once  folic acid 1 milliGRAM(s) Oral daily  gabapentin 100 milliGRAM(s) Oral at bedtime  glucagon  Injectable 1 milliGRAM(s) IntraMuscular once  hydrocortisone hemorrhoidal Suppository 1 Suppository(s) Rectal two times a day  influenza   Vaccine 0.5 milliLiter(s) IntraMuscular once  melatonin 6 milliGRAM(s) Oral at bedtime  methylPREDNISolone 64 milliGRAM(s) Oral daily  metoprolol tartrate 25 milliGRAM(s) Oral two times a day  multivitamin 1 Tablet(s) Oral daily  nystatin Powder 1 Application(s) Topical two times a day  pantoprazole    Tablet 40 milliGRAM(s) Oral before breakfast  psyllium Powder 1 Packet(s) Oral daily  senna 2 Tablet(s) Oral at bedtime  sodium chloride 0.9% lock flush 5 milliLiter(s) IV Push two times a day  zinc oxide 40% Paste 1 Application(s) Topical three times a day    MEDICATIONS  (PRN):  albuterol/ipratropium for Nebulization 3 milliLiter(s) Nebulizer every 6 hours PRN Shortness of Breath and/or Wheezing  aluminum hydroxide/magnesium hydroxide/simethicone Suspension 30 milliLiter(s) Oral every 4 hours PRN Dyspepsia  dextrose Oral Gel 15 Gram(s) Oral once PRN Blood Glucose LESS THAN 70 milliGRAM(s)/deciliter  loperamide 2 milliGRAM(s) Oral three times a day PRN Diarrhea  polyethylene glycol 3350 17 Gram(s) Oral daily PRN Constipation  SIMETHICONE SOFTGELS 250 milliGRAM(s) 250 milliGRAM(s) Oral two times a day PRN for gas  sodium chloride 0.65% Nasal 1 Spray(s) Both Nostrils every 8 hours PRN Nasal Congestion

## 2023-10-16 NOTE — PROGRESS NOTE ADULT - ASSESSMENT
Patient is seated in wheelchair at bedside. She is alert and oriented. Mood is frustrated. She is cooperative. Themes discussed include patient's current level of functioning, coping with slower than expected rate of progress, and discrepancy between patient and partner's expectations.      Psychoeducation is provided regarding appropriate expectations, caregiver stress, caregiver advocacy versus patient self-advocacy, and collaborative communication. Patient would like her partner to accompany her to OT/PT sessions to have a better sense of how her needs are being addressed. Cognitive-behavioral approach is used to address the relationship between thoughts and emotions. Support and encouragement are provided. Patient and her partner express appreciation for the session.      Plan: Individual psychotherapy to address adjustment. Coordination of care with PM&R. Neuropsychology remains available through discharge.

## 2023-10-16 NOTE — PROGRESS NOTE ADULT - SUBJECTIVE AND OBJECTIVE BOX
Patient seen at bedside for 55-minute, supportive psychotherapy session. Her partner, Kiara, is present per patient preference and participates along with patient.     Patient expresses anxiety last night which interfered with sleep, as she feels physically "trapped" by inability to move, scratch an itch, etc. She indicates a desire to "face reality" that her progress is slower than expected and that she will likely need to continue therapy at [facility].     Patient's partner expresses frustration and trying to motivate patient with "tough love." She doesn't understand why patient isn't progressing faster, as that was the expectation communicated to her at the prior hospital. Thus, she is trying to remain optimistic that the situation can turn around within the next couple of weeks.

## 2023-10-16 NOTE — PROGRESS NOTE ADULT - NS ATTEND AMEND GEN_ALL_CORE FT
Seen and examined, findings as noted, not revised, labs reviewed,   Interventions as stated  Extensively d/w patient and her partner present at bed side    Patient has preference for improvement of UE motor function and we will facilitate this as stated  Reports significant neruopathic symptoms--tingling/numbness of extremities but no pain, agreed to trail of gabapentin and E stim    Labs--reviewed and d/w patient     Tolerating AC, no bleeding     Consult recs and f/u appreciated    Continue therapy  Continue wound care Rt groin wound  All treatments as stated      Spent 63 mins, patient review, review of results, discussion of treatments, treatment post acute rehab and care co ordination

## 2023-10-17 PROCEDURE — 99232 SBSQ HOSP IP/OBS MODERATE 35: CPT

## 2023-10-17 RX ADMIN — APIXABAN 5 MILLIGRAM(S): 2.5 TABLET, FILM COATED ORAL at 18:09

## 2023-10-17 RX ADMIN — Medication 1 TABLET(S): at 11:23

## 2023-10-17 RX ADMIN — Medication 25 MILLIGRAM(S): at 18:09

## 2023-10-17 RX ADMIN — SODIUM CHLORIDE 5 MILLILITER(S): 9 INJECTION INTRAMUSCULAR; INTRAVENOUS; SUBCUTANEOUS at 18:06

## 2023-10-17 RX ADMIN — ZINC OXIDE 1 APPLICATION(S): 200 OINTMENT TOPICAL at 06:17

## 2023-10-17 RX ADMIN — Medication 64 MILLIGRAM(S): at 06:16

## 2023-10-17 RX ADMIN — Medication 1 MILLIGRAM(S): at 11:23

## 2023-10-17 RX ADMIN — Medication 25 MILLIGRAM(S): at 06:24

## 2023-10-17 RX ADMIN — ZINC OXIDE 1 APPLICATION(S): 200 OINTMENT TOPICAL at 13:08

## 2023-10-17 RX ADMIN — NYSTATIN CREAM 1 APPLICATION(S): 100000 CREAM TOPICAL at 06:17

## 2023-10-17 RX ADMIN — SODIUM CHLORIDE 5 MILLILITER(S): 9 INJECTION INTRAMUSCULAR; INTRAVENOUS; SUBCUTANEOUS at 06:22

## 2023-10-17 RX ADMIN — ZINC OXIDE 1 APPLICATION(S): 200 OINTMENT TOPICAL at 21:12

## 2023-10-17 RX ADMIN — GABAPENTIN 100 MILLIGRAM(S): 400 CAPSULE ORAL at 21:09

## 2023-10-17 RX ADMIN — Medication 500 MILLIGRAM(S): at 11:23

## 2023-10-17 RX ADMIN — NYSTATIN CREAM 1 APPLICATION(S): 100000 CREAM TOPICAL at 18:11

## 2023-10-17 RX ADMIN — APIXABAN 5 MILLIGRAM(S): 2.5 TABLET, FILM COATED ORAL at 06:14

## 2023-10-17 RX ADMIN — Medication 1 PACKET(S): at 11:23

## 2023-10-17 RX ADMIN — Medication 6 MILLIGRAM(S): at 21:09

## 2023-10-17 RX ADMIN — ATOVAQUONE 1500 MILLIGRAM(S): 750 SUSPENSION ORAL at 14:46

## 2023-10-17 NOTE — PROGRESS NOTE ADULT - SUBJECTIVE AND OBJECTIVE BOX
CC: Non-specific Interstitial Lung Disease     Today's Subjective & Objective Findings:  Patient seen and examined at bedside this AM with Dr López and medical student.  Partner present during encounter.   Admits to intermittent sleep due to thoughts/anxiety.  Reports that she her immediate rehab goal is to improve UE motor strength to enable her scratch her hair and improved her fine motor activity  She asked for a projection of how long it would take for her to achieve this, but we told her that its difficult to determine. However considering that her current UE motor function is 2/5, it would take beyond 1 wk and she would continue to need therapy in EMIGDIO post discharge  Discussed rehab progress and likely need for EMIGDIO in near future.  We discussed possible options for EMIGDIO and she plans to start working on this    Discussed starting trial of gabapentin and e stimulation for BL hand and BL feet numbness/tingling.   No other complaints at this time.      ROS   Therapy-- engaging, motivated  Progress  with motor function  is minimal, and often frustrating to patient  However leg swelling significantly reduced, ankle strength improving      Observed during transfer from bed to , still requiring max/total assistance  Working on muscle strengthening LE muscles including stretching of the gastroc/soleus muscles  During bring period about 1-2 min, she was off oxy and oxy sat was >95%, desaturating to 80s on commencement of therapy, hence oxy NC recommenced   Patient prefers to focus on BL UE stretching/strengthening and ESTIM.       Vital Signs Last 24 Hrs  T(C): 36.6 (16 Oct 2023 08:11), Max: 36.6 (16 Oct 2023 08:11)  T(F): 97.8 (16 Oct 2023 08:11), Max: 97.8 (16 Oct 2023 08:11)  HR: 72 (16 Oct 2023 08:11) (72 - 78)  BP: 98/64 (16 Oct 2023 08:11) (98/64 - 121/79)  RR: 16 (16 Oct 2023 08:11) (16 - 16)  SpO2: 96% (16 Oct 2023 08:11) (96% - 96%)air      PHYSICAL EXAM:  Gen -  Comfortable, sitting on a WC, Oxy sats normal on NC oxy 3L, oxy sats  persistently > 92%  HEENT - , nostrils are moist, no bleeding, mod icterus  Neck - Supple, No limited ROM, O2 via NC  Pulm - good   Cardiovascular - RRR, S1S2  Chest - good chest expansion, good respiratory effort  Abdomen - Soft, non tender, +BS, scattered ecchymosis on abd  Extremities - No Cyanosis, no clubbing, 1+ edema to b/l feet, non pitting,    no calf tenderness, LUE Med line    Neuro-     Cognitive - awake, alert, fully oriented, engaging, speech normal      Motor -                    LEFT    UE - 4/5                    RIGHT UE - 4/5                     LEFT    LE - 2 +-/5 limited by leg edema                    RIGHT LE - 2+-/5  limitted by leg edema,, which is reducing      Sensory - Intact        Reflexes - DTR 1+      Coordination - FTN  impaired  due to weakness   MSK: soreness and weakness  Psychiatric - Mood stable, Affect WNL  Skin: Left lower abdomen ruptured blister 3.0 x 1.0, stage 2 pressure injury to right buttock 4 x1 and left buttock 3x1, stage 2 pressure injury ti right inner thigh 0.2 x 0.2, right groin wound 10.5 x 2.0, Left groin wound 5 x 5,        LABS:                        9.5    16.23 )-----------( 232      ( 16 Oct 2023 06:07 )             30.5     10-16    135  |  99  |  21  ----------------------------<  118<H>  3.7   |  29  |  0.47<L>    Ca    9.2      16 Oct 2023 06:07    TPro  5.0<L>  /  Alb  2.3<L>  /  TBili  4.5<H>  /  DBili  x   /  AST  65<H>  /  ALT  150<H>  /  AlkPhos  367<H>  10-16      Urinalysis Basic - ( 16 Oct 2023 06:07 )    Color: x / Appearance: x / SG: x / pH: x  Gluc: 118 mg/dL / Ketone: x  / Bili: x / Urobili: x   Blood: x / Protein: x / Nitrite: x   Leuk Esterase: x / RBC: x / WBC x   Sq Epi: x / Non Sq Epi: x / Bacteria: x      MEDICATIONS  (STANDING):  apixaban 5 milliGRAM(s) Oral every 12 hours  artificial  tears Solution 1 Drop(s) Both EYES every 12 hours  ascorbic acid 500 milliGRAM(s) Oral daily  atovaquone  Suspension 1500 milliGRAM(s) Oral daily  dextrose 5%. 1000 milliLiter(s) (50 mL/Hr) IV Continuous <Continuous>  dextrose 5%. 1000 milliLiter(s) (100 mL/Hr) IV Continuous <Continuous>  dextrose 50% Injectable 12.5 Gram(s) IV Push once  dextrose 50% Injectable 25 Gram(s) IV Push once  dextrose 50% Injectable 25 Gram(s) IV Push once  folic acid 1 milliGRAM(s) Oral daily  gabapentin 100 milliGRAM(s) Oral at bedtime  glucagon  Injectable 1 milliGRAM(s) IntraMuscular once  hydrocortisone hemorrhoidal Suppository 1 Suppository(s) Rectal two times a day  influenza   Vaccine 0.5 milliLiter(s) IntraMuscular once  melatonin 6 milliGRAM(s) Oral at bedtime  methylPREDNISolone 64 milliGRAM(s) Oral daily  metoprolol tartrate 25 milliGRAM(s) Oral two times a day  multivitamin 1 Tablet(s) Oral daily  nystatin Powder 1 Application(s) Topical two times a day  pantoprazole    Tablet 40 milliGRAM(s) Oral before breakfast  psyllium Powder 1 Packet(s) Oral daily  senna 2 Tablet(s) Oral at bedtime  sodium chloride 0.9% lock flush 5 milliLiter(s) IV Push two times a day  zinc oxide 40% Paste 1 Application(s) Topical three times a day    MEDICATIONS  (PRN):  albuterol/ipratropium for Nebulization 3 milliLiter(s) Nebulizer every 6 hours PRN Shortness of Breath and/or Wheezing  aluminum hydroxide/magnesium hydroxide/simethicone Suspension 30 milliLiter(s) Oral every 4 hours PRN Dyspepsia  dextrose Oral Gel 15 Gram(s) Oral once PRN Blood Glucose LESS THAN 70 milliGRAM(s)/deciliter  loperamide 2 milliGRAM(s) Oral three times a day PRN Diarrhea  polyethylene glycol 3350 17 Gram(s) Oral daily PRN Constipation  SIMETHICONE SOFTGELS 250 milliGRAM(s) 250 milliGRAM(s) Oral two times a day PRN for gas  sodium chloride 0.65% Nasal 1 Spray(s) Both Nostrils every 8 hours PRN Nasal Congestion   CC: Non-specific Interstitial Lung Disease     Today's Subjective & Objective Findings:  Patient seen and examined at bedside this AM with Dr López, NP and medical student.  Partner present during encounter.   Admits to a good night sleep.  Tolerating gabapentin well.  Discussed e stimulation that occurred during therapy yesterday for BL hand numbness/tingling. Patient states the experience was "weird" but is willing to try it again in therapy soon.    No other complaints at this time.      ROS   Therapy-- engaging, motivated  Progress  with motor function  is minimal, and often frustrating to patient  However leg swelling significantly reduced, ankle strength improving      Observed during transfer from bed to , still requiring max/total assistance  Working on muscle strengthening LE muscles including stretching of the gastroc/soleus muscles  During bring period about 1-2 min, she was off oxy and oxy sat was >95%, desaturating to 80s on commencement of therapy, hence oxy NC recommenced   Patient prefers to focus on BL UE stretching/strengthening and ESTIM.     ICU Vital Signs Last 24 Hrs  T(C): 36.4 (17 Oct 2023 08:42), Max: 36.4 (17 Oct 2023 08:42)  T(F): 97.6 (17 Oct 2023 08:42), Max: 97.6 (17 Oct 2023 08:42)  HR: 70 (17 Oct 2023 08:42) (70 - 81)  BP: 108/72 (17 Oct 2023 08:42) (104/65 - 113/78)  RR: 16 (17 Oct 2023 08:42) (16 - 16)  SpO2: 96% (17 Oct 2023 08:42) (96% - 98%)    O2 Parameters below as of 17 Oct 2023 08:42  Patient On (Oxygen Delivery Method): nasal cannula  O2 Flow (L/min): 1      PHYSICAL EXAM:  Gen -  Comfortable, sitting on a WC, Oxy sats normal on NC oxy 3L, oxy sats  persistently > 92%  HEENT - , nostrils are moist, no bleeding, mod icterus  Neck - Supple, No limited ROM, O2 via NC  Pulm - good   Cardiovascular - RRR, S1S2  Chest - good chest expansion, good respiratory effort  Abdomen - Soft, non tender, +BS, scattered ecchymosis on abd  Extremities - No Cyanosis, no clubbing, 1+ edema to b/l feet, non pitting,    no calf tenderness, LUE Med line    Neuro-     Cognitive - awake, alert, fully oriented, engaging, speech normal      Motor -                    LEFT    UE - 4/5                    RIGHT UE - 4/5                     LEFT    LE - 2 +-/5 limited by leg edema                    RIGHT LE - 2+-/5  limitted by leg edema,, which is reducing      Sensory - Intact        Reflexes - DTR 1+      Coordination - FTN  impaired  due to weakness   MSK: soreness and weakness  Psychiatric - Mood stable, Affect WNL  Skin: Left lower abdomen ruptured blister 3.0 x 1.0, stage 2 pressure injury to right buttock 4 x1 and left buttock 3x1, stage 2 pressure injury ti right inner thigh 0.2 x 0.2, right groin wound 10.5 x 2.0, Left groin wound 5 x 5,        LABS:                        9.5    16.23 )-----------( 232      ( 16 Oct 2023 06:07 )             30.5     10-16    135  |  99  |  21  ----------------------------<  118<H>  3.7   |  29  |  0.47<L>    Ca    9.2      16 Oct 2023 06:07    TPro  5.0<L>  /  Alb  2.3<L>  /  TBili  4.5<H>  /  DBili  x   /  AST  65<H>  /  ALT  150<H>  /  AlkPhos  367<H>  10-16      Urinalysis Basic - ( 16 Oct 2023 06:07 )    Color: x / Appearance: x / SG: x / pH: x  Gluc: 118 mg/dL / Ketone: x  / Bili: x / Urobili: x   Blood: x / Protein: x / Nitrite: x   Leuk Esterase: x / RBC: x / WBC x   Sq Epi: x / Non Sq Epi: x / Bacteria: x      MEDICATIONS  (STANDING):  apixaban 5 milliGRAM(s) Oral every 12 hours  artificial  tears Solution 1 Drop(s) Both EYES every 12 hours  ascorbic acid 500 milliGRAM(s) Oral daily  atovaquone  Suspension 1500 milliGRAM(s) Oral daily  dextrose 5%. 1000 milliLiter(s) (50 mL/Hr) IV Continuous <Continuous>  dextrose 5%. 1000 milliLiter(s) (100 mL/Hr) IV Continuous <Continuous>  dextrose 50% Injectable 25 Gram(s) IV Push once  dextrose 50% Injectable 25 Gram(s) IV Push once  dextrose 50% Injectable 12.5 Gram(s) IV Push once  folic acid 1 milliGRAM(s) Oral daily  gabapentin 100 milliGRAM(s) Oral at bedtime  glucagon  Injectable 1 milliGRAM(s) IntraMuscular once  hydrocortisone hemorrhoidal Suppository 1 Suppository(s) Rectal two times a day  influenza   Vaccine 0.5 milliLiter(s) IntraMuscular once  melatonin 6 milliGRAM(s) Oral at bedtime  methylPREDNISolone 64 milliGRAM(s) Oral daily  metoprolol tartrate 25 milliGRAM(s) Oral two times a day  multivitamin 1 Tablet(s) Oral daily  nystatin Powder 1 Application(s) Topical two times a day  pantoprazole    Tablet 40 milliGRAM(s) Oral before breakfast  psyllium Powder 1 Packet(s) Oral daily  senna 2 Tablet(s) Oral at bedtime  sodium chloride 0.9% lock flush 5 milliLiter(s) IV Push two times a day  zinc oxide 40% Paste 1 Application(s) Topical three times a day    MEDICATIONS  (PRN):  albuterol/ipratropium for Nebulization 3 milliLiter(s) Nebulizer every 6 hours PRN Shortness of Breath and/or Wheezing  aluminum hydroxide/magnesium hydroxide/simethicone Suspension 30 milliLiter(s) Oral every 4 hours PRN Dyspepsia  dextrose Oral Gel 15 Gram(s) Oral once PRN Blood Glucose LESS THAN 70 milliGRAM(s)/deciliter  loperamide 2 milliGRAM(s) Oral three times a day PRN Diarrhea  polyethylene glycol 3350 17 Gram(s) Oral daily PRN Constipation  SIMETHICONE SOFTGELS 250 milliGRAM(s) 250 milliGRAM(s) Oral three times a day PRN for gas  sodium chloride 0.65% Nasal 1 Spray(s) Both Nostrils every 8 hours PRN Nasal Congestion     CC: Non-specific Interstitial Lung Disease     Today's Subjective & Objective Findings:  Patient seen and examined at bedside this AM with Dr López, NP and medical student.  Partner present at bedside  Reports a good night sleep.  Tolerating gabapentin well., no drowziness  Reports some difficulty operating his bed to enhance pressure offloading   NP demonstrated the operation to patient and partner    Patient interested in getting a gell over bed around his buttock for pressure offloading   D/w nurse manager to see if this can be obtained  Otherwise patient is feeling well, engaging in therapy     ROS   Therapy-- engaging, motivated  Reports tolerating E stim yesterday to both arms, but felt it takes time to assemeble the device and she is concerned that this may take up therapy time  Advised to give this another trial and she can make a decision on its effectiveness  Wound care is progressing well for sacral and Rt groin wounds   However leg swelling significantly reduced, ankle strength improving      ICU Vital Signs Last 24 Hrs  T(C): 36.4 (17 Oct 2023 08:42), Max: 36.4 (17 Oct 2023 08:42)  T(F): 97.6 (17 Oct 2023 08:42), Max: 97.6 (17 Oct 2023 08:42)  HR: 70 (17 Oct 2023 08:42) (70 - 81)  BP: 108/72 (17 Oct 2023 08:42) (104/65 - 113/78)  RR: 16 (17 Oct 2023 08:42) (16 - 16)  SpO2: 96% (17 Oct 2023 08:42) (96% - 98%)  O2 Parameters below as of 17 Oct 2023 08:42  Patient On (Oxygen Delivery Method): nasal cannula  O2 Flow (L/min): 1      PHYSICAL EXAM:  Gen -  Comfortable, sitting on a WC, Oxy sats normal on NC oxy 3L, oxy sats  persistently > 93 %  HEENT - , nostrils are moist, no bleeding, mod icterus  Neck - Supple, No limited ROM, O2 via NC  Pulm - good   Cardiovascular - RRR, S1S2  Chest - good chest expansion, good respiratory effort  Abdomen - Soft, non tender, +BS, scattered ecchymosis on abd  Extremities - No Cyanosis, no clubbing, 1+ edema to b/l feet, non pitting,    no calf tenderness, LUE Med line    Neuro-     Cognitive - awake, alert, fully oriented, engaging, speech normal      Motor -                    LEFT    UE - 4/5                    RIGHT UE - 4/5                     LEFT    LE - 2 +-/5 limited by leg edema                    RIGHT LE - 2+-/5  limitted by leg edema,, which is reducing      Sensory - Intact        Reflexes - DTR 1+      Coordination - FTN  impaired  due to weakness   MSK: soreness and weakness  Psychiatric - Mood stable, Affect WNL  Skin: Left lower abdomen ruptured blister 3.0 x 1.0, stage 2 pressure injury to right buttock 4 x1 and left buttock 3x1, stage 2 pressure injury ti right inner thigh 0.2 x 0.2, right groin wound 10.5 x 2.0, Left groin wound 5 x 5,        LABS:                        9.5    16.23 )-----------( 232      ( 16 Oct 2023 06:07 )             30.5     10-16    135  |  99  |  21  ----------------------------<  118<H>  3.7   |  29  |  0.47<L>    Ca    9.2      16 Oct 2023 06:07    TPro  5.0<L>  /  Alb  2.3<L>  /  TBili  4.5<H>  /  DBili  x   /  AST  65<H>  /  ALT  150<H>  /  AlkPhos  367<H>  10-16      Urinalysis Basic - ( 16 Oct 2023 06:07 )    Color: x / Appearance: x / SG: x / pH: x  Gluc: 118 mg/dL / Ketone: x  / Bili: x / Urobili: x   Blood: x / Protein: x / Nitrite: x   Leuk Esterase: x / RBC: x / WBC x   Sq Epi: x / Non Sq Epi: x / Bacteria: x      MEDICATIONS  (STANDING):  apixaban 5 milliGRAM(s) Oral every 12 hours  artificial  tears Solution 1 Drop(s) Both EYES every 12 hours  ascorbic acid 500 milliGRAM(s) Oral daily  atovaquone  Suspension 1500 milliGRAM(s) Oral daily  dextrose 5%. 1000 milliLiter(s) (50 mL/Hr) IV Continuous <Continuous>  dextrose 5%. 1000 milliLiter(s) (100 mL/Hr) IV Continuous <Continuous>  dextrose 50% Injectable 25 Gram(s) IV Push once  dextrose 50% Injectable 25 Gram(s) IV Push once  dextrose 50% Injectable 12.5 Gram(s) IV Push once  folic acid 1 milliGRAM(s) Oral daily  gabapentin 100 milliGRAM(s) Oral at bedtime  glucagon  Injectable 1 milliGRAM(s) IntraMuscular once  hydrocortisone hemorrhoidal Suppository 1 Suppository(s) Rectal two times a day  influenza   Vaccine 0.5 milliLiter(s) IntraMuscular once  melatonin 6 milliGRAM(s) Oral at bedtime  methylPREDNISolone 64 milliGRAM(s) Oral daily  metoprolol tartrate 25 milliGRAM(s) Oral two times a day  multivitamin 1 Tablet(s) Oral daily  nystatin Powder 1 Application(s) Topical two times a day  pantoprazole    Tablet 40 milliGRAM(s) Oral before breakfast  psyllium Powder 1 Packet(s) Oral daily  senna 2 Tablet(s) Oral at bedtime  sodium chloride 0.9% lock flush 5 milliLiter(s) IV Push two times a day  zinc oxide 40% Paste 1 Application(s) Topical three times a day    MEDICATIONS  (PRN):  albuterol/ipratropium for Nebulization 3 milliLiter(s) Nebulizer every 6 hours PRN Shortness of Breath and/or Wheezing  aluminum hydroxide/magnesium hydroxide/simethicone Suspension 30 milliLiter(s) Oral every 4 hours PRN Dyspepsia  dextrose Oral Gel 15 Gram(s) Oral once PRN Blood Glucose LESS THAN 70 milliGRAM(s)/deciliter  loperamide 2 milliGRAM(s) Oral three times a day PRN Diarrhea  polyethylene glycol 3350 17 Gram(s) Oral daily PRN Constipation  SIMETHICONE SOFTGELS 250 milliGRAM(s) 250 milliGRAM(s) Oral three times a day PRN for gas  sodium chloride 0.65% Nasal 1 Spray(s) Both Nostrils every 8 hours PRN Nasal Congestion

## 2023-10-17 NOTE — PROGRESS NOTE ADULT - ASSESSMENT
Assessment/Plan:  ALIZA FOLEY is a 62 year old female with PMH of pAFIB and sciatica; who presented to [facility] ED with SOB, and was found to have groundglass opacities and interstitial lung disease. She was treated with empiric Vancomycin and Zosyn. She underwent a bronchoscopy with bronchoalveolar lavage and pulse steroids. She was given IV diuretics for increased pulmonary congestion, but her respiratory status continued to worsen.  On 9/13, she was transferred to [facility] for ECMO requirements. She was further treated with Plasmapheresis, Rituximab and high-dose steroids, with significant improvement. Her overall hospital course was complicated by thrombocytopenia (s/p several platelet transfusions), leukocytosis (infectious workup entirely negative- s/p Vanco and Ceftriaxone), AFIB with RVR (resolved with Metoprolol), dysphagia (requiring NGT), transaminitis (secondary to acute illness and hypotension),  non-occlusive RIJ DVT (started on Lovenox). Patient now admitted for a multidisciplinary rehab program. 10-05-23 @ 13:18    * Labs unremarkable,,stable anemia, LFT elevated, but downtrending, Fluctuating leucocytosis, asymptomatic   * Wound care following  * Reducing dose of steroid  * Pulmonary following   * Start Gabapentin 100mg at HS  * Therapy- focus on BL UE stretching & strengthening, ESTIM     #Interstitial Lung Disease  - Gait Instability, ADL impairments and Functional impairments: start Comprehensive Rehab Program of PT/OT/SLP - 3 hours a day, 5 days a week  - P&O as needed   - Non-specific Interstitial Lung Disease  - Requiring ECMO   - Improvement s/p Plasmapheresis, Rituximab and high- dose steroids   - Albuterol/Ipratropium Nebulizer every 6 hours  - Mepron 1500mg daily  - PO Medrol ( due to liver dysfunction): Plan will be to taper by ~20% every 2 weeks  Medrol 64mg x 2 weeks  56mg x 2 weeks  44mg x 2 weeks   36mg x 2 weeks - Follow up Outpatient   -- Repeat CT Chest in 6 weeks   - Pulmonary following     #pAFIB/Rt Ij thrombus  - Metoprolol 25mg BID and lovenox  -- Cardiology consult--recm can switch to oral AC  --Await discussion with patient's cardiologist before switch to oral AC as requested by patient   (recent GI bleed, from hemorrhoid, resolved)    # Chronic Tinnitus   - ENT consult for hx of tinnitus and feeling of ear fullness  - ENT review and recs appreciate     # Lymphedema both legs -chronic and Rt IJ thrombus  - ACE Wrap BL LEs  - BL LE doppler negative for DVT  - BL UE doppler with chronic thrombotic changes in RIJ   - Surg consult recs--commence anticoagulation as recs by cardiology  - Eliquis 5mg BID - tolerating well     #Transaminitis --LFT Down trending   - Secondary to acute illness and hypotension at [facility]  - Trend LFTs  - Outpatient follow up     * Neuropathy  --commenced trial of gabapentin 10/16 and E stim  --Work on motor strengthening, priority for UE as preferred by patient     #Sleep  - Melatonin 6mg at HS    #Skin  - Skin: Left lower abdomen ruptured blister 3.0 x 1.0, stage 2 pressure injury to right buttock 4 x1 and left buttock 3x1, stage 2 pressure injury ti right inner thigh 0.2 x 0.2, right groin wound 10.5 x 2.0, Left groin wound 5 x 5,  right neck scab wound  -- MAD to skin folds- Nystatin to submammary and abdominal pannus BID  -- MAD to L groin- cleanse with NS, Triad cream daily  -- Mad to R groin- Nystatin powder daily   -- R groin wound-- aquacel packing, Triad to periwound, Allevyn foam- daily and PRN   - Pressure injury/Skin: OOB to Chair, PT/OT   - Offload pressure, low air loss support surfaces, T&P every 2 hours   --Wound care consult-10/9 -Multiple wounds--perineal and buttock/sacral, recs appreciated   --Suggest Continue treatments as below:   Twice daily & PRN Normal Saline Cleanse of abdominal pannus & breast folds, perineal, Left & Right inner buttock erosions.  Pat thoroughly dry.   Apply Desitin generously twice daily (& PRN) to perineal, buttocks, and left groin erosions, leave open to air.    Apply Nystatin powder to abdominal pannus and breast folds.  Suggest use of Interdry cloths in folds to 'wick' moisture.  Suggest daily Normal Saline Cleanse of right groin surgical wound, pat dry.  Apply Cavillon film barrier to intact periwound skin.  Place Aquacell strip over open area, cover with foam dressing.  - Wound care following        #Pain Mgmt   - Tylenol PRN   - Start Gabapentin 100mg at HS     #GI/Bowel Mgmt   - Pantoprazole  - Senna  --on hold due to recent Loose BM  - PRN: Maalox     #/Bladder Mgmt --voiding     #FEN   #Dysphagia  - Diet - Minced & Moist  [CC]    - Dysphaiga- SLP evaluation     #Health maintenance  - Folic Acid   - MVI  - Ocean nasal spray    # Difficulty with access to peripheral veins--  * Blood draws from Left arm Medline     #Precautions / PROPHYLAXIS:   - Falls  - ortho: Weight bearing status: WBAT   - Lungs: Aspiration, Incentive Spirometer   - DVT PPX: Eliquis 5 mg BID   ---------------------------    * Labs unremarkable,,stable anemia, LFT elevated, but downtrending, fluctuating leucocytosis, asymptomatic ,       Liaison with family  Patient's partner present during review, details of review d/w her, detailed explanation of therapy protocol explained and she was happy with same  10/9--Partner present during review and discussed details treatment plan with her    Liaison with providers  Consult recs form multiple specialities being implemented  Surgical consult and recs 10/9--agreed with plan for AC for Rt IJ chronic thrombus    10/10--No response to multiple calls to patient's private cardiologist Dr Otto Stratton  ph   Will discuss commencing oral anticoagulation with patient and commence     ---------------------------  IDT conference on 10/10  TDD: 10/27 to home vs EMIGDIO.  Barriers: Obesity, poor skin integrity, poor endurance, BL UE weakness, poor coordination, pain, incontinent x2.  Social Work: Lives with spouse in [place] 6 months of year. DC to apt in [place] with elevator, no steps. Supportive spouse.   DME: Has 02 compressor.   OT: Max A for eating/grooming. Total for all other ADLs and transfers. Goal of Mod A.   PT: Damaris 2-3pr A. Sitting EOB is max.   SLP: SBS with TLs. Mild cog. Severe aphonia.  RT: --n/a   Functional level on DC: Min A for transfers.  ---------------------------  OUTPATIENT/FOLLOW UP:    Chelo Trae  Pulmonary Disease  [address], [address] [place] [address]  Phone: [phone]  Fax: [phone]  Follow Up Time: 2 weeks    Ryanne Allen  Rheumatology  [address] [address]  Phone: [phone]  Fax: [phone]  Follow Up Time: 1 week    Kyle Pierce  Internal Medicine  [address] [place] [address], [address]  [place] [address]  Phone: [phone]  Fax: [phone]  Follow Up Time: 2 weeks    Addy Powers  Pulmonary Disease  410 [facility], [address] [id number]  Phone: [phone]  Fax: [phone]  Follow Up Time:     Kwame Hawley  Critical Care Medicine  410 [facility], [address] [address]  Phone: [phone]  Fax: [phone]  Follow Up Time:     Neena Borrego  Rheumatology  [address] [facility], Floor 3  [place] [address]  Phone: [phone]  Fax: [phone]  Follow Up Time:    [facility]  [facility] [address]  Scheduled Appointment: 09/13/2023  2:40 PM  ---------------------------   Assessment/Plan:  ALIZA FOLEY is a 62 year old female with PMH of pAFIB and sciatica; who presented to [facility] ED with SOB, and was found to have groundglass opacities and interstitial lung disease. She was treated with empiric Vancomycin and Zosyn. She underwent a bronchoscopy with bronchoalveolar lavage and pulse steroids. She was given IV diuretics for increased pulmonary congestion, but her respiratory status continued to worsen.  On 9/13, she was transferred to [facility] for ECMO requirements. She was further treated with Plasmapheresis, Rituximab and high-dose steroids, with significant improvement. Her overall hospital course was complicated by thrombocytopenia (s/p several platelet transfusions), leukocytosis (infectious workup entirely negative- s/p Vanco and Ceftriaxone), AFIB with RVR (resolved with Metoprolol), dysphagia (requiring NGT), transaminitis (secondary to acute illness and hypotension),  non-occlusive RIJ DVT (started on Lovenox). Patient now admitted for a multidisciplinary rehab program. 10-05-23 @ 13:18    * Labs unremarkable, stable anemia, LFT elevated, but downtrending, Downtrending leukocytosis, asymptomatic   * Wound care following  * Reducing dose of steroid  * Pulmonary following   * Tolerating Gabapentin 100mg at HS  * Therapy- focus on BL UE stretching & strengthening, ESTIM     #Interstitial Lung Disease  - Gait Instability, ADL impairments and Functional impairments: start Comprehensive Rehab Program of PT/OT/SLP - 3 hours a day, 5 days a week  - P&O as needed   - Non-specific Interstitial Lung Disease  - Requiring ECMO   - Improvement s/p Plasmapheresis, Rituximab and high- dose steroids   - Albuterol/Ipratropium Nebulizer every 6 hours  - Mepron 1500mg daily  - PO Medrol ( due to liver dysfunction): Plan will be to taper by ~20% every 2 weeks  Medrol 64mg x 2 weeks  56mg x 2 weeks  44mg x 2 weeks   36mg x 2 weeks - Follow up Outpatient   -- Repeat CT Chest in 6 weeks   - Pulmonary following     #pAFIB/Rt Ij thrombus  - Metoprolol 25mg BID and lovenox  -- Cardiology consult--recm can switch to oral AC  --Await discussion with patient's cardiologist before switch to oral AC as requested by patient   (recent GI bleed, from hemorrhoid, resolved)    # Chronic Tinnitus   - ENT consult for hx of tinnitus and feeling of ear fullness  - ENT review and recs appreciate     # Lymphedema both legs -chronic and Rt IJ thrombus  - ACE Wrap BL LEs  - BL LE doppler negative for DVT  - BL UE doppler with chronic thrombotic changes in RIJ   - Surg consult recs--commence anticoagulation as recs by cardiology  - Eliquis 5mg BID - tolerating well     #Transaminitis --LFT Down trending   - Secondary to acute illness and hypotension at [facility]  - Trend LFTs  - Outpatient follow up     * Neuropathy  --commenced trial of gabapentin 10/16 and E stim, tolerating gabapentin, willing to continue trial of E stim for now  --Work on motor strengthening, priority for UE as preferred by patient     #Sleep  - Melatonin 6mg at HS    #Skin  - Skin: Left lower abdomen ruptured blister 3.0 x 1.0, stage 2 pressure injury to right buttock 4 x1 and left buttock 3x1, stage 2 pressure injury ti right inner thigh 0.2 x 0.2, right groin wound 10.5 x 2.0, Left groin wound 5 x 5,  right neck scab wound  -- MAD to skin folds- Nystatin to submammary and abdominal pannus BID  -- MAD to L groin- cleanse with NS, Triad cream daily  -- Mad to R groin- Nystatin powder daily   -- R groin wound-- aquacel packing, Triad to periwound, Allevyn foam- daily and PRN   - Pressure injury/Skin: OOB to Chair, PT/OT   - Offload pressure, low air loss support surfaces, T&P every 2 hours   --Wound care consult-10/9 -Multiple wounds--perineal and buttock/sacral, recs appreciated   --Suggest Continue treatments as below:   Twice daily & PRN Normal Saline Cleanse of abdominal pannus & breast folds, perineal, Left & Right inner buttock erosions.  Pat thoroughly dry.   Apply Desitin generously twice daily (& PRN) to perineal, buttocks, and left groin erosions, leave open to air.    Apply Nystatin powder to abdominal pannus and breast folds.  Suggest use of Interdry cloths in folds to 'wick' moisture.  Suggest daily Normal Saline Cleanse of right groin surgical wound, pat dry.  Apply Cavillon film barrier to intact periwound skin.  Place Aquacell strip over open area, cover with foam dressing.  - Wound care following        #Pain Mgmt   - Tylenol PRN   - Continue Gabapentin 100mg at HS     #GI/Bowel Mgmt   - Pantoprazole  - Senna  --on hold due to recent Loose BM  - PRN: Maalox     #/Bladder Mgmt --voiding     #FEN   #Dysphagia  - Diet - Minced & Moist  [CC]    - Dysphaiga- SLP evaluation     #Health maintenance  - Folic Acid   - MVI  - Ocean nasal spray    # Difficulty with access to peripheral veins--  * Blood draws from Left arm Medline     #Precautions / PROPHYLAXIS:   - Falls  - ortho: Weight bearing status: WBAT   - Lungs: Aspiration, Incentive Spirometer   - DVT PPX: Eliquis 5 mg BID   ---------------------------    * Labs unremarkable,,stable anemia, LFT elevated, but downtrending, Downtrending leucocytosis, asymptomatic ,       Liaison with family  Patient's partner present during review, details of review d/w her, detailed explanation of therapy protocol explained and she was happy with same  10/9--Partner present during review and discussed details treatment plan with her    Liaison with providers  Consult recs form multiple specialities being implemented  Surgical consult and recs 10/9--agreed with plan for AC for Rt IJ chronic thrombus    10/10--No response to multiple calls to patient's private cardiologist Dr Otto Stratton  ph   Will discuss commencing oral anticoagulation with patient and commence     ---------------------------  IDT conference on 10/10  TDD: 10/27 to home vs EMIGDIO.  Barriers: Obesity, poor skin integrity, poor endurance, BL UE weakness, poor coordination, pain, incontinent x2.  Social Work: Lives with spouse in [place] 6 months of year. DC to apt in [place] with elevator, no steps. Supportive spouse.   DME: Has 02 compressor.   OT: Max A for eating/grooming. Total for all other ADLs and transfers. Goal of Mod A.   PT: Damaris 2-3pr A. Sitting EOB is max.   SLP: SBS with TLs. Mild cog. Severe aphonia.  RT: --n/a   Functional level on DC: Min A for transfers.  ---------------------------  OUTPATIENT/FOLLOW UP:    Chelo Trae  Pulmonary Disease  [address], [address] [place] [address]  Phone: [phone]  Fax: [phone]  Follow Up Time: 2 weeks    Ryanne Allen  Rheumatology  [address] [address]  Phone: [phone]  Fax: [phone]  Follow Up Time: 1 week    Kyle Pierce  Internal Medicine  [address] [place] [address], [address]  [place] [address]  Phone: [phone]  Fax: [phone]  Follow Up Time: 2 weeks    Addy Powers  Pulmonary Disease  410 [facility], [address] [id number]  Phone: [phone]  Fax: [phone]  Follow Up Time:     Kwame Hawley  Critical Care Medicine  410 [facility], [address] [address]  Phone: [phone]  Fax: [phone]  Follow Up Time:     Neena Borrego  Rheumatology  [address] [place] [address], [address]  [place] [address]  Phone: [phone]  Fax: [phone]  Follow Up Time:    [facility]  [facility] [address]  Scheduled Appointment: 09/13/2023  2:40 PM  ---------------------------   Assessment/Plan:  ALIZA FOLEY is a 62 year old female with PMH of pAFIB and sciatica; who presented to [facility] ED with SOB, and was found to have groundglass opacities and interstitial lung disease. She was treated with empiric Vancomycin and Zosyn. She underwent a bronchoscopy with bronchoalveolar lavage and pulse steroids. She was given IV diuretics for increased pulmonary congestion, but her respiratory status continued to worsen.  On 9/13, she was transferred to [facility] for ECMO requirements. She was further treated with Plasmapheresis, Rituximab and high-dose steroids, with significant improvement. Her overall hospital course was complicated by thrombocytopenia (s/p several platelet transfusions), leukocytosis (infectious workup entirely negative- s/p Vanco and Ceftriaxone), AFIB with RVR (resolved with Metoprolol), dysphagia (requiring NGT), transaminitis (secondary to acute illness and hypotension),  non-occlusive RIJ DVT (started on Lovenox). Patient now admitted for a multidisciplinary rehab program. 10-05-23 @ 13:18    * Labs unremarkable, stable anemia, LFT elevated, but downtrending, Downtrending leukocytosis, asymptomatic   * On reducing dose of steroid  * Pulmonary following   * Tolerating Gabapentin 100mg at HS  * Nursing to check if gell for pressure offload can be obtained, on buttock     #Interstitial Lung Disease  - Gait Instability, ADL impairments and Functional impairments: start Comprehensive Rehab Program of PT/OT/SLP - 3 hours a day, 5 days a week  - P&O as needed   - Non-specific Interstitial Lung Disease  - Requiring ECMO   - Improvement s/p Plasmapheresis, Rituximab and high- dose steroids   - Albuterol/Ipratropium Nebulizer every 6 hours  - Mepron 1500mg daily  - PO Medrol ( due to liver dysfunction): Plan will be to taper by ~20% every 2 weeks  Medrol 64mg x 2 weeks  56mg x 2 weeks  44mg x 2 weeks   36mg x 2 weeks - Follow up Outpatient   -- Repeat CT Chest in 6 weeks   - Pulmonary following     #pAFIB/Rt Ij thrombus  - Metoprolol 25mg BID and lovenox  -- Cardiology consult--recm can switch to oral AC  --Await discussion with patient's cardiologist before switch to oral AC as requested by patient   (recent GI bleed, from hemorrhoid, resolved)    # Chronic Tinnitus   - ENT consult for hx of tinnitus and feeling of ear fullness  - ENT review and recs appreciate     # Lymphedema both legs -chronic and Rt IJ thrombus  - ACE Wrap BL LEs  - BL LE doppler negative for DVT  - BL UE doppler with chronic thrombotic changes in RIJ   - Surg consult recs--commence anticoagulation as recs by cardiology  - Eliquis 5mg BID - tolerating well     #Transaminitis --LFT Down trending   - Secondary to acute illness and hypotension at [facility]  - Trend LFTs  - Outpatient follow up     * Neuropathy  --continue gabapentin 10/16 and E stim, tolerating gabapentin, willing to continue trial of E stim for now  --Work on motor strengthening, priority for UE as preferred by patient     #Sleep  - Melatonin 6mg at HS    #Skin  - Skin: Left lower abdomen ruptured blister 3.0 x 1.0, stage 2 pressure injury to right buttock 4 x1 and left buttock 3x1, stage 2 pressure injury ti right inner thigh 0.2 x 0.2, right groin wound 10.5 x 2.0, Left groin wound 5 x 5,  right neck scab wound  -- MAD to skin folds- Nystatin to submammary and abdominal pannus BID  -- MAD to L groin- cleanse with NS, Triad cream daily  -- Mad to R groin- Nystatin powder daily   -- R groin wound-- aquacel packing, Triad to periwound, Allevyn foam- daily and PRN   - Pressure injury/Skin: OOB to Chair, PT/OT   - Offload pressure, low air loss support surfaces, T&P every 2 hours   --Wound care consult-10/9 -Multiple wounds--perineal and buttock/sacral, recs appreciated   --Suggest Continue treatments as below:   Twice daily & PRN Normal Saline Cleanse of abdominal pannus & breast folds, perineal, Left & Right inner buttock erosions.  Pat thoroughly dry.   Apply Desitin generously twice daily (& PRN) to perineal, buttocks, and left groin erosions, leave open to air.    Apply Nystatin powder to abdominal pannus and breast folds.  Suggest use of Interdry cloths in folds to 'wick' moisture.  Suggest daily Normal Saline Cleanse of right groin surgical wound, pat dry.  Apply Cavillon film barrier to intact periwound skin.  Place Aquacell strip over open area, cover with foam dressing.  - Wound care following        #Pain Mgmt   - Tylenol PRN   - Continue Gabapentin 100mg at HS     #GI/Bowel Mgmt   - Pantoprazole  - Senna  --on hold due to recent Loose BM  - PRN: Maalox     #/Bladder Mgmt --voiding     #FEN   #Dysphagia  - Diet - Minced & Moist  [CC]    - Dysphaiga- SLP evaluation     #Health maintenance  - Folic Acid   - MVI  - Ocean nasal spray    # Difficulty with access to peripheral veins--  * Blood draws from Left arm Medline     #Precautions / PROPHYLAXIS:   - Falls  - ortho: Weight bearing status: WBAT   - Lungs: Aspiration, Incentive Spirometer   - DVT PPX: Eliquis 5 mg BID   ---------------------------    * Labs unremarkable,,stable anemia, LFT elevated, but downtrending, Downtrending leucocytosis, asymptomatic ,       Liaison with family  Patient's partner present during review, details of review d/w her, detailed explanation of therapy protocol explained and she was happy with same  10/9--Partner present during review and discussed details treatment plan with her    Liaison with providers  Consult recs form multiple specialities being implemented  Surgical consult and recs 10/9--agreed with plan for AC for Rt IJ chronic thrombus    10/10--No response to multiple calls to patient's private cardiologist Dr Otto Stratton  ph   Will discuss commencing oral anticoagulation with patient and commence     ---------------------------  IDT conference on 10/10  TDD: 10/27 to home vs EMIGDIO.  Barriers: Obesity, poor skin integrity, poor endurance, BL UE weakness, poor coordination, pain, incontinent x2.  Social Work: Lives with spouse in [place] 6 months of year. DC to apt in [place] with elevator, no steps. Supportive spouse.   DME: Has 02 compressor.   OT: Max A for eating/grooming. Total for all other ADLs and transfers. Goal of Mod A.   PT: Damaris 2-3pr A. Sitting EOB is max.   SLP: SBS with TLs. Mild cog. Severe aphonia.  RT: --n/a   Functional level on DC: Min A for transfers.  ---------------------------  OUTPATIENT/FOLLOW UP:    Chelo Trae  Pulmonary Disease  [address], [address] [place] [address]  Phone: [phone]  Fax: [phone]  Follow Up Time: 2 weeks    Ryanne Allen  Rheumatology  [address] [address]  Phone: [phone]  Fax: [phone]  Follow Up Time: 1 week    Kyle Pierce  Internal Medicine  [address] [place] [address], [address]  [place] [address]  Phone: [phone]  Fax: [phone]  Follow Up Time: 2 weeks    Addy Powers  Pulmonary Disease  410 [facility], [address] [id number]  Phone: [phone]  Fax: [phone]  Follow Up Time:     Kwame Hawley  Critical Care Medicine  410 [facility], [address] [address]  Phone: [phone]  Fax: [phone]  Follow Up Time:     Neena Borrego  Rheumatology  [address] [place] [address], [address]  [place] [address]  Phone: [phone]  Fax: [phone]  Follow Up Time:    [facility]  [facility] [address]  Scheduled Appointment: 09/13/2023  2:40 PM  ---------------------------

## 2023-10-17 NOTE — CHART NOTE - NSCHARTNOTEFT_GEN_A_CORE
IDT conference on 10/17   Social Work: Provided private hire list.   SLP: --   OT: Max A for eating/grooming. Total A/Dependent for all other ADLs/transfers  PT: Damaris 2-3pr A. Sitting on edge of mat for 10 seconds.  RT: Declines rec therapy.   DME: TBD   Barriers: Total A, pain, poor trunk control, 02 requirements,   TDD: 10/27 to home vs EMIGDIO

## 2023-10-17 NOTE — PROGRESS NOTE ADULT - NS ATTEND AMEND GEN_ALL_CORE FT
Seen and examined, findings as noted, not revised, labs reviewed,   Interventions as stated  Extensively d/w patient and her partner present at bed side    Patient has preference for improvement of UE motor function and we will facilitate this as stated  Reports significant neruopathic symptoms--tingling/numbness of extremities but no pain, agreed to trail of gabapentin and E stim    Labs--reviewed and d/w patient     Tolerating AC, no bleeding     Consult recs and f/u appreciated    Continue therapy  Continue wound care Rt groin wound  All treatments as stated      Spent 63 mins, patient review, review of results, discussion of treatments, treatment post acute rehab and care co ordination Seen and examined   Note revised    Findings as noted  No new symptoms  Admits to feeling well    Continue therapy   Gell overlay to buttock to be explored by nursing

## 2023-10-18 LAB
CULTURE RESULTS: SIGNIFICANT CHANGE UP
CULTURE RESULTS: SIGNIFICANT CHANGE UP
SPECIMEN SOURCE: SIGNIFICANT CHANGE UP
SPECIMEN SOURCE: SIGNIFICANT CHANGE UP

## 2023-10-18 PROCEDURE — 99233 SBSQ HOSP IP/OBS HIGH 50: CPT

## 2023-10-18 RX ADMIN — Medication 25 MILLIGRAM(S): at 08:58

## 2023-10-18 RX ADMIN — SODIUM CHLORIDE 5 MILLILITER(S): 9 INJECTION INTRAMUSCULAR; INTRAVENOUS; SUBCUTANEOUS at 18:33

## 2023-10-18 RX ADMIN — ATOVAQUONE 1500 MILLIGRAM(S): 750 SUSPENSION ORAL at 18:00

## 2023-10-18 RX ADMIN — Medication 1 MILLIGRAM(S): at 14:56

## 2023-10-18 RX ADMIN — Medication 1 PACKET(S): at 18:01

## 2023-10-18 RX ADMIN — Medication 25 MILLIGRAM(S): at 18:01

## 2023-10-18 RX ADMIN — NYSTATIN CREAM 1 APPLICATION(S): 100000 CREAM TOPICAL at 06:49

## 2023-10-18 RX ADMIN — APIXABAN 5 MILLIGRAM(S): 2.5 TABLET, FILM COATED ORAL at 06:50

## 2023-10-18 RX ADMIN — ZINC OXIDE 1 APPLICATION(S): 200 OINTMENT TOPICAL at 14:53

## 2023-10-18 RX ADMIN — Medication 64 MILLIGRAM(S): at 06:50

## 2023-10-18 RX ADMIN — Medication 500 MILLIGRAM(S): at 14:56

## 2023-10-18 RX ADMIN — Medication 30 MILLILITER(S): at 22:09

## 2023-10-18 RX ADMIN — SODIUM CHLORIDE 5 MILLILITER(S): 9 INJECTION INTRAMUSCULAR; INTRAVENOUS; SUBCUTANEOUS at 06:56

## 2023-10-18 RX ADMIN — ZINC OXIDE 1 APPLICATION(S): 200 OINTMENT TOPICAL at 06:49

## 2023-10-18 RX ADMIN — APIXABAN 5 MILLIGRAM(S): 2.5 TABLET, FILM COATED ORAL at 18:00

## 2023-10-18 RX ADMIN — Medication 1 TABLET(S): at 14:56

## 2023-10-18 NOTE — PROGRESS NOTE ADULT - SUBJECTIVE AND OBJECTIVE BOX
CC: Non-specific Interstitial Lung Disease     Today's Subjective & Objective Findings:  Patient seen and examined at bedside this AM with Dr López.  Partner present at bedside  Reports a good night sleep.  Last BM on 10/18, per patient.   Wishes to extend DC 2 additional weeks from 10/27 DC date.  Discussed at length potential DC possibilities.   No other complaints at this time.      ROS   Therapy-- engaging, motivated  Reports tolerating E stim yesterday to both arms, but felt it takes time to assemeble the device and she is concerned that this may take up therapy time  Advised to give this another trial and she can make a decision on its effectiveness  Wound care is progressing well for sacral and Rt groin wounds   However leg swelling significantly reduced, ankle strength improving      Vital Signs Last 24 Hrs  T(C): 36.6 (18 Oct 2023 08:55), Max: 36.6 (18 Oct 2023 08:55)  T(F): 97.8 (18 Oct 2023 08:55), Max: 97.8 (18 Oct 2023 08:55)  HR: 82 (18 Oct 2023 08:55) (63 - 82)  BP: 106/74 (18 Oct 2023 08:55) (101/68 - 130/83)  BP(mean): --  RR: 16 (18 Oct 2023 08:55) (16 - 16)  SpO2: 98% (18 Oct 2023 08:55) (96% - 98%)      PHYSICAL EXAM:  Gen -  Comfortable, sitting on a WC, Oxy sats normal on NC oxy 3L, oxy sats  persistently > 93 %  HEENT - , nostrils are moist, no bleeding, mod icterus  Neck - Supple, No limited ROM, O2 via NC  Pulm - good   Cardiovascular - RRR, S1S2  Chest - good chest expansion, good respiratory effort  Abdomen - Soft, non tender, +BS, scattered ecchymosis on abd  Extremities - No Cyanosis, no clubbing, 1+ edema to b/l feet, non pitting,    no calf tenderness, LUE Med line    Neuro-     Cognitive - awake, alert, fully oriented, engaging, speech normal      Motor -                    LEFT    UE - 4/5                    RIGHT UE - 4/5                     LEFT    LE - 2 +-/5 limited by leg edema                    RIGHT LE - 2+-/5  limitted by leg edema,, which is reducing      Sensory - Intact        Reflexes - DTR 1+      Coordination - FTN  impaired  due to weakness   MSK: soreness and weakness  Psychiatric - Mood stable, Affect WNL  Skin: Left lower abdomen ruptured blister 3.0 x 1.0, stage 2 pressure injury to right buttock 4 x1 and left buttock 3x1, stage 2 pressure injury ti right inner thigh 0.2 x 0.2, right groin wound 10.5 x 2.0, Left groin wound 5 x 5,        LABS:                        9.5    16.23 )-----------( 232      ( 16 Oct 2023 06:07 )             30.5     10-16    135  |  99  |  21  ----------------------------<  118<H>  3.7   |  29  |  0.47<L>    Ca    9.2      16 Oct 2023 06:07    TPro  5.0<L>  /  Alb  2.3<L>  /  TBili  4.5<H>  /  DBili  x   /  AST  65<H>  /  ALT  150<H>  /  AlkPhos  367<H>  10-16      Urinalysis Basic - ( 16 Oct 2023 06:07 )    Color: x / Appearance: x / SG: x / pH: x  Gluc: 118 mg/dL / Ketone: x  / Bili: x / Urobili: x   Blood: x / Protein: x / Nitrite: x   Leuk Esterase: x / RBC: x / WBC x   Sq Epi: x / Non Sq Epi: x / Bacteria: x      MEDICATIONS  (STANDING):  apixaban 5 milliGRAM(s) Oral every 12 hours  artificial  tears Solution 1 Drop(s) Both EYES every 12 hours  ascorbic acid 500 milliGRAM(s) Oral daily  atovaquone  Suspension 1500 milliGRAM(s) Oral daily  dextrose 5%. 1000 milliLiter(s) (50 mL/Hr) IV Continuous <Continuous>  dextrose 5%. 1000 milliLiter(s) (100 mL/Hr) IV Continuous <Continuous>  dextrose 50% Injectable 25 Gram(s) IV Push once  dextrose 50% Injectable 25 Gram(s) IV Push once  dextrose 50% Injectable 12.5 Gram(s) IV Push once  folic acid 1 milliGRAM(s) Oral daily  gabapentin 100 milliGRAM(s) Oral at bedtime  glucagon  Injectable 1 milliGRAM(s) IntraMuscular once  hydrocortisone hemorrhoidal Suppository 1 Suppository(s) Rectal two times a day  influenza   Vaccine 0.5 milliLiter(s) IntraMuscular once  melatonin 6 milliGRAM(s) Oral at bedtime  methylPREDNISolone 64 milliGRAM(s) Oral daily  metoprolol tartrate 25 milliGRAM(s) Oral two times a day  multivitamin 1 Tablet(s) Oral daily  nystatin Powder 1 Application(s) Topical two times a day  pantoprazole    Tablet 40 milliGRAM(s) Oral before breakfast  psyllium Powder 1 Packet(s) Oral daily  senna 2 Tablet(s) Oral at bedtime  sodium chloride 0.9% lock flush 5 milliLiter(s) IV Push two times a day  zinc oxide 40% Paste 1 Application(s) Topical three times a day    MEDICATIONS  (PRN):  albuterol/ipratropium for Nebulization 3 milliLiter(s) Nebulizer every 6 hours PRN Shortness of Breath and/or Wheezing  aluminum hydroxide/magnesium hydroxide/simethicone Suspension 30 milliLiter(s) Oral every 4 hours PRN Dyspepsia  dextrose Oral Gel 15 Gram(s) Oral once PRN Blood Glucose LESS THAN 70 milliGRAM(s)/deciliter  loperamide 2 milliGRAM(s) Oral three times a day PRN Diarrhea  polyethylene glycol 3350 17 Gram(s) Oral daily PRN Constipation  SIMETHICONE SOFTGELS 250 milliGRAM(s) 250 milliGRAM(s) Oral three times a day PRN for gas  sodium chloride 0.65% Nasal 1 Spray(s) Both Nostrils every 8 hours PRN Nasal Congestion     CC: Non-specific Interstitial Lung Disease     Today's Subjective & Objective Findings:  Patient seen and examined at bedside this AM with Dr López.  Partner present at bedside  Reports a good night sleep.  Last BM on 10/18, per patient.   Wishes to extend DC 2 additional weeks from 10/27 DC date.        ROS --No dyspnea, head ache, chest or abd pain, LBM 10/17, large, but had intermittent episodes of flatulence and minimal stool volume    Therapy-- engaging, motivated  Reports improvement with muscle activity at both upper back muscles last night, attributes it to success with E stim  Happy with continued reduction of LE edema with ace wrap and therapy      Meeting at bed side  We had a brief meeting at bed side at this time  We discussed details from IDT including projected dc date of 10/27, ([facility] 10/21), but the extended time was to make up for time spent addressing medical issues on acute rehab admission, during which he got limited therapy  They appreciated this  But they has some requests  1--To explore extension of acute rehab treatment by at 2 wk period from 10/27, with the intention of home dc afterwards  2--They are willing to personally pay, depending on the cost if insurance will not cover this  3--If both requests not possible, they will agree to [facility] provided patient's partner will be allowed to care for patient in same room in the [facility] facility (they report being told that in [facility], this arrangement will not be possible) if that is true, they would pre [facility] or [facility]  She agrees to continue therapy while SW team explores these options    SUBSEQUENT MEETING WITH [facility] NURSE MANAGER AND SUPERVISION, NP and SW  We discussed patient's/family's requests  Plan is to explore these requests and update patient on possibilities of each    Vital Signs Last 24 Hrs  T(C): 36.6 (18 Oct 2023 08:55), Max: 36.6 (18 Oct 2023 08:55)  T(F): 97.8 (18 Oct 2023 08:55), Max: 97.8 (18 Oct 2023 08:55)  HR: 82 (18 Oct 2023 08:55) (63 - 82)  BP: 106/74 (18 Oct 2023 08:55) (101/68 - 130/83)  RR: 16 (18 Oct 2023 08:55) (16 - 16)  SpO2: 98% (18 Oct 2023 08:55) (96% - 98%)      PHYSICAL EXAM:  Gen -  Comfortable, sitting on a WC, Oxy sats normal on NC oxy 3L, oxy sats  persistently > 93 %  HEENT - , nostrils are moist, no bleeding, mod icterus  Neck - Supple, No limited ROM, O2 via NC  Pulm - good   Cardiovascular - RRR, S1S2  Chest - good chest expansion,   Abdomen - Soft, non tender, +BS, scattered ecchymosis on abd  Extremities - No Cyanosis, no clubbing, 1+ edema to b/l feet, non pitting,    no calf tenderness, LUE Med line    Neuro-     Cognitive - awake, alert, fully oriented, engaging, speech normal      Motor -                    LEFT    UE - 4/5                    RIGHT UE - 4/5                     LEFT    LE - 2 +-/5 limited by leg edema                    RIGHT LE - 2+-/5  limitted by leg edema,, which is reducing      Sensory - Intact        Reflexes - DTR 1+      Coordination - FTN  impaired  due to weakness   MSK: soreness and weakness  Psychiatric - Mood stable, Affect WNL  Skin: Left lower abdomen ruptured blister 3.0 x 1.0, stage 2 pressure injury to right buttock 4 x1 and left buttock 3x1, stage 2 pressure injury ti right inner thigh 0.2 x 0.2, right groin wound 10.5 x 2.0, Left groin wound 5 x 5,      MMT--Able to contact B/L upper back muscles,   Elbow abduction and adduction, gravity eliminated 2+    LABS:                        9.5    16.23 )-----------( 232      ( 16 Oct 2023 06:07 )             30.5     10-16    135  |  99  |  21  ----------------------------<  118<H>  3.7   |  29  |  0.47<L>    Ca    9.2      16 Oct 2023 06:07    TPro  5.0<L>  /  Alb  2.3<L>  /  TBili  4.5<H>  /  DBili  x   /  AST  65<H>  /  ALT  150<H>  /  AlkPhos  367<H>  10-16      Urinalysis Basic - ( 16 Oct 2023 06:07 )    Color: x / Appearance: x / SG: x / pH: x  Gluc: 118 mg/dL / Ketone: x  / Bili: x / Urobili: x   Blood: x / Protein: x / Nitrite: x   Leuk Esterase: x / RBC: x / WBC x   Sq Epi: x / Non Sq Epi: x / Bacteria: x      MEDICATIONS  (STANDING):  apixaban 5 milliGRAM(s) Oral every 12 hours  artificial  tears Solution 1 Drop(s) Both EYES every 12 hours  ascorbic acid 500 milliGRAM(s) Oral daily  atovaquone  Suspension 1500 milliGRAM(s) Oral daily  dextrose 5%. 1000 milliLiter(s) (50 mL/Hr) IV Continuous <Continuous>  dextrose 5%. 1000 milliLiter(s) (100 mL/Hr) IV Continuous <Continuous>  dextrose 50% Injectable 25 Gram(s) IV Push once  dextrose 50% Injectable 25 Gram(s) IV Push once  dextrose 50% Injectable 12.5 Gram(s) IV Push once  folic acid 1 milliGRAM(s) Oral daily  gabapentin 100 milliGRAM(s) Oral at bedtime  glucagon  Injectable 1 milliGRAM(s) IntraMuscular once  hydrocortisone hemorrhoidal Suppository 1 Suppository(s) Rectal two times a day  influenza   Vaccine 0.5 milliLiter(s) IntraMuscular once  melatonin 6 milliGRAM(s) Oral at bedtime  methylPREDNISolone 64 milliGRAM(s) Oral daily  metoprolol tartrate 25 milliGRAM(s) Oral two times a day  multivitamin 1 Tablet(s) Oral daily  nystatin Powder 1 Application(s) Topical two times a day  pantoprazole    Tablet 40 milliGRAM(s) Oral before breakfast  psyllium Powder 1 Packet(s) Oral daily  senna 2 Tablet(s) Oral at bedtime  sodium chloride 0.9% lock flush 5 milliLiter(s) IV Push two times a day  zinc oxide 40% Paste 1 Application(s) Topical three times a day    MEDICATIONS  (PRN):  albuterol/ipratropium for Nebulization 3 milliLiter(s) Nebulizer every 6 hours PRN Shortness of Breath and/or Wheezing  aluminum hydroxide/magnesium hydroxide/simethicone Suspension 30 milliLiter(s) Oral every 4 hours PRN Dyspepsia  dextrose Oral Gel 15 Gram(s) Oral once PRN Blood Glucose LESS THAN 70 milliGRAM(s)/deciliter  loperamide 2 milliGRAM(s) Oral three times a day PRN Diarrhea  polyethylene glycol 3350 17 Gram(s) Oral daily PRN Constipation  SIMETHICONE SOFTGELS 250 milliGRAM(s) 250 milliGRAM(s) Oral three times a day PRN for gas  sodium chloride 0.65% Nasal 1 Spray(s) Both Nostrils every 8 hours PRN Nasal Congestion

## 2023-10-18 NOTE — PROGRESS NOTE ADULT - NS ATTEND AMEND GEN_ALL_CORE FT
Seen and examined  Finding as noted, note revised  Patient had no acute med complaint   Happy with recent progress in motor function with therapy    Some alteration of bowel activity noted, will explore for any fecal burden on imaging today    Still requiring NC oxy.    Alert and fully oriented  Leg edema continuously reducing   Still total assist for ADLs    Discussed IDT details, discharge plan, in two meetings, with patient and with [facility] nursing admin/SW    Continue therapy  Explore all possibilities with regards to discharge  SW will discuss with SW admin, when to apply for extension of stay, explore cost of private pay for extended period from 10/27.

## 2023-10-18 NOTE — PROGRESS NOTE ADULT - ASSESSMENT
Assessment/Plan:  ALIZA FOLEY is a 62 year old female with PMH of pAFIB and sciatica; who presented to [facility] ED with SOB, and was found to have groundglass opacities and interstitial lung disease. She was treated with empiric Vancomycin and Zosyn. She underwent a bronchoscopy with bronchoalveolar lavage and pulse steroids. She was given IV diuretics for increased pulmonary congestion, but her respiratory status continued to worsen.  On 9/13, she was transferred to [facility] for ECMO requirements. She was further treated with Plasmapheresis, Rituximab and high-dose steroids, with significant improvement. Her overall hospital course was complicated by thrombocytopenia (s/p several platelet transfusions), leukocytosis (infectious workup entirely negative- s/p Vanco and Ceftriaxone), AFIB with RVR (resolved with Metoprolol), dysphagia (requiring NGT), transaminitis (secondary to acute illness and hypotension),  non-occlusive RIJ DVT (started on Lovenox). Patient now admitted for a multidisciplinary rehab program. 10-05-23 @ 13:18    * Await AB XR to assess stool burden   * On Methylprednisolone taper   * Pulmonary following   * Rehab team to explore all potential DC options (EMIGDIO, extended stay/self pay?, etc)   * Perform BL elbow flexion & elbow extension with antigravity in therapy     #Interstitial Lung Disease  - Gait Instability, ADL impairments and Functional impairments: start Comprehensive Rehab Program of PT/OT/SLP - 3 hours a day, 5 days a week  - P&O as needed   - Non-specific Interstitial Lung Disease  - Requiring ECMO   - Improvement s/p Plasmapheresis, Rituximab and high- dose steroids   - Albuterol/Ipratropium Nebulizer every 6 hours  - Mepron 1500mg daily  - PO Medrol ( due to liver dysfunction): Plan will be to taper by ~20% every 2 weeks  Medrol   64mg x 2 weeks  56mg x 2 weeks  44mg x 2 weeks   36mg x 2 weeks - Follow up Outpatient   -- Repeat CT Chest in 6 weeks   - Pulmonary following   -Perform BL elbow flexion & elbow extension with antigravity in therapy     #pAFIB/Rt Ij thrombus  - Metoprolol 25mg BID and lovenox  -- Cardiology consult--recm can switch to oral AC  --Await discussion with patient's cardiologist before switch to oral AC as requested by patient   (recent GI bleed, from hemorrhoid, resolved)    # Chronic Tinnitus   - ENT consult for hx of tinnitus and feeling of ear fullness  - ENT review and recs appreciate     # Lymphedema both legs -chronic and Rt IJ thrombus  - ACE Wrap BL LEs  - BL LE doppler negative for DVT  - BL UE doppler with chronic thrombotic changes in RIJ   - Surg consult recs--commence anticoagulation as recs by cardiology  - Eliquis 5mg BID - tolerating well     #Transaminitis --LFT Down trending   - Secondary to acute illness and hypotension at [facility]  - Trend LFTs  - Outpatient follow up     #Neuropathy  --continue gabapentin 10/16 and E stim, tolerating gabapentin, willing to continue trial of E stim for now  --Work on motor strengthening, priority for UE as preferred by patient     #Sleep  - Melatonin 6mg at HS    #Skin  - Skin: Left lower abdomen ruptured blister 3.0 x 1.0, stage 2 pressure injury to right buttock 4 x1 and left buttock 3x1, stage 2 pressure injury ti right inner thigh 0.2 x 0.2, right groin wound 10.5 x 2.0, Left groin wound 5 x 5,  right neck scab wound  -- MAD to skin folds- Nystatin to submammary and abdominal pannus BID  -- MAD to L groin- cleanse with NS, Triad cream daily  -- Mad to R groin- Nystatin powder daily   -- R groin wound-- aquacel packing, Triad to periwound, Allevyn foam- daily and PRN   - Pressure injury/Skin: OOB to Chair, PT/OT   - Offload pressure, low air loss support surfaces, T&P every 2 hours   --Wound care consult-10/9 -Multiple wounds--perineal and buttock/sacral, recs appreciated   --Suggest Continue treatments as below:   Twice daily & PRN Normal Saline Cleanse of abdominal pannus & breast folds, perineal, Left & Right inner buttock erosions.  Pat thoroughly dry.   Apply Desitin generously twice daily (& PRN) to perineal, buttocks, and left groin erosions, leave open to air.    Apply Nystatin powder to abdominal pannus and breast folds.  Suggest use of Interdry cloths in folds to 'wick' moisture.  Suggest daily Normal Saline Cleanse of right groin surgical wound, pat dry.  Apply Cavillon film barrier to intact periwound skin.  Place Aquacell strip over open area, cover with foam dressing.  - Wound care following     #Pain Mgmt   - Tylenol PRN   - Gabapentin 100mg at HS     #GI/Bowel Mgmt   - Pantoprazole  - Senna  --on hold due to recent Loose BM  - PRN: Maalox   - Abdominal XR to assess stool burden     #/Bladder Mgmt --voiding     #FEN   #Dysphagia  - Diet - Minced & Moist  [CC]    - Dysphaiga- SLP evaluation     #Health maintenance  - Folic Acid   - MVI  - Ocean nasal spray    # Difficulty with access to peripheral veins--  * Blood draws from Left arm Medline     #Precautions / PROPHYLAXIS:   - Falls  - ortho: Weight bearing status: WBAT   - Lungs: Aspiration, Incentive Spirometer   - DVT PPX: Eliquis 5 mg BID   ---------------------------    * Labs unremarkable,,stable anemia, LFT elevated, but downtrending, Downtrending leucocytosis, asymptomatic ,       Liaison with family  Patient's partner present during review, details of review d/w her, detailed explanation of therapy protocol explained and she was happy with same  10/9--Partner present during review and discussed details treatment plan with her    Liaison with providers  Consult recs form multiple specialities being implemented  Surgical consult and recs 10/9--agreed with plan for AC for Rt IJ chronic thrombus    10/10--No response to multiple calls to patient's private cardiologist Dr Otto Stratton  ph   Will discuss commencing oral anticoagulation with patient and commence     ---------------------------  IDT conference on 10/10  TDD: 10/27 to home vs EMIGDIO.  Barriers: Obesity, poor skin integrity, poor endurance, BL UE weakness, poor coordination, pain, incontinent x2.  Social Work: Lives with spouse in [place] 6 months of year. DC to apt in [place] with elevator, no steps. Supportive spouse.   DME: Has 02 compressor.   OT: Max A for eating/grooming. Total for all other ADLs and transfers. Goal of Mod A.   PT: Damaris 2-3pr A. Sitting EOB is max.   SLP: SBS with TLs. Mild cog. Severe aphonia.  RT: --n/a   Functional level on DC: Min A for transfers.  ---------------------------  OUTPATIENT/FOLLOW UP:    Trae Whitney  Pulmonary Disease  [address], [address] [place] [address]  Phone: [phone]  Fax: [phone]  Follow Up Time: 2 weeks    Ryanne Allen  Rheumatology  [address] [address]  Phone: [phone]  Fax: [phone]  Follow Up Time: 1 week    Kyle Pierce  Internal Medicine  [address] [place] [address], [address]  [place] [address]  Phone: [phone]  Fax: [phone]  Follow Up Time: 2 weeks    Addy Powers  Pulmonary Disease  [address] [facility], [address] [id number]  Phone: [phone]  Fax: [phone]  Follow Up Time:     [facility]  Critical Care Medicine  [address] [facility], [address] [address]  Phone: [phone]  Fax: [phone]  Follow Up Time:     Neena Borrego  Rheumatology  [address] [facility], [address]  [place] [address]  Phone: [phone]  Fax: [phone]  Follow Up Time:    [facility]  [facility] [address]  Scheduled Appointment: 09/13/2023  2:40 PM  ---------------------------   Assessment/Plan:  ALIZA FOLEY is a 62 year old female with PMH of pAFIB and sciatica; who presented to [facility] ED with SOB, and was found to have groundglass opacities and interstitial lung disease. She was treated with empiric Vancomycin and Zosyn. She underwent a bronchoscopy with bronchoalveolar lavage and pulse steroids. She was given IV diuretics for increased pulmonary congestion, but her respiratory status continued to worsen.  On 9/13, she was transferred to [facility] for ECMO requirements. She was further treated with Plasmapheresis, Rituximab and high-dose steroids, with significant improvement. Her overall hospital course was complicated by thrombocytopenia (s/p several platelet transfusions), leukocytosis (infectious workup entirely negative- s/p Vanco and Ceftriaxone), AFIB with RVR (resolved with Metoprolol), dysphagia (requiring NGT), transaminitis (secondary to acute illness and hypotension),  non-occlusive RIJ DVT (started on Lovenox). Patient now admitted for a multidisciplinary rehab program. 10-05-23 @ 13:18    * Await AB XR to assess stool burden   * On Methylprednisolone taper   * Pulmonary following   * Rehab team to explore all potential DC options (EMIGDIO, extended stay/self pay?, etc)   * Perform BL elbow flexion & elbow extension with antigravity in therapy   * Wound care review 10/17, appreciated    #Interstitial Lung Disease and Mixed connective tissue diesease  - Gait Instability, ADL impairments and Functional impairments: start Comprehensive Rehab Program of PT/OT/SLP - 3 hours a day, 5 days a week  - P&O as needed   - Non-specific Interstitial Lung Disease  - Requiring ECMO   - Improvement s/p Plasmapheresis, Rituximab and high- dose steroids   - Albuterol/Ipratropium Nebulizer every 6 hours  - Mepron 1500mg daily  - PO Medrol ( due to liver dysfunction): Plan will be to taper by ~20% every 2 weeks  Medrol   64mg x 2 weeks  56mg x 2 weeks  44mg x 2 weeks   36mg x 2 weeks - Follow up Outpatient   -- Repeat CT Chest in 6 weeks   - Pulmonary following   -Perform BL elbow flexion & elbow extension with antigravity in therapy     #pAFIB/Rt Ij thrombus  - Metoprolol 25mg BID and lovenox  -- Cardiology consult--recm can switch to oral AC  --Await discussion with patient's cardiologist before switch to oral AC as requested by patient   (recent GI bleed, from hemorrhoid, resolved)    # Chronic Tinnitus   - ENT consult for hx of tinnitus and feeling of ear fullness  - ENT review and recs appreciate, Patient already made ENT appointment for f/u    # Lymphedema both legs -chronic and Rt IJ thrombus  - ACE Wrap BL LEs  - BL LE doppler negative for DVT  - BL UE doppler with chronic thrombotic changes in RIJ   - Surg consult recs--commence anticoagulation as recs by cardiology  - Eliquis 5mg BID - tolerating well     #Transaminitis --LFT Down trending   - Secondary to acute illness and hypotension at [facility]  - Trend LFTs  - Outpatient follow up     #Neuropathy  --continue gabapentin 10/16 and E stim, tolerating gabapentin, willing to continue trial of E stim for now  --Work on motor strengthening, priority for UE as preferred by patient     #Sleep  - Melatonin 6mg at HS    #Skin  - Skin: Left lower abdomen ruptured blister 3.0 x 1.0, stage 2 pressure injury to right buttock 4 x1 and left buttock 3x1, stage 2 pressure injury ti right inner thigh 0.2 x 0.2, right groin wound 10.5 x 2.0, Left groin wound 5 x 5,  right neck scab wound  -- MAD to skin folds- Nystatin to submammary and abdominal pannus BID  -- MAD to L groin- cleanse with NS, Triad cream daily  -- Mad to R groin- Nystatin powder daily   -- R groin wound-- aquacel packing, Triad to periwound, Allevyn foam- daily and PRN   - Pressure injury/Skin: OOB to Chair, PT/OT   - Offload pressure, low air loss support surfaces, T&P every 2 hours   --Wound care consult-10/9 -Multiple wounds--perineal and buttock/sacral, recs appreciated   --Suggest Continue treatments as below:   Twice daily & PRN Normal Saline Cleanse of abdominal pannus & breast folds, perineal, Left & Right inner buttock erosions.  Pat thoroughly dry.   Apply Desitin generously twice daily (& PRN) to perineal, buttocks, and left groin erosions, leave open to air.    Apply Nystatin powder to abdominal pannus and breast folds.  Suggest use of Interdry cloths in folds to 'wick' moisture.  Suggest daily Normal Saline Cleanse of right groin surgical wound, pat dry.  Apply Cavillon film barrier to intact periwound skin.  Place Aquacell strip over open area, cover with foam dressing.  - Wound care following     #Pain Mgmt   - Tylenol PRN   - Gabapentin 100mg at HS     #GI/Bowel Mgmt   - Pantoprazole  - Senna  --on hold due to recent Loose BM  - PRN: Maalox   - Abdominal XR to assess stool burden     #/Bladder Mgmt --voiding     #FEN   #Dysphagia  - Diet - Minced & Moist  [CC]    - Dysphaiga- SLP evaluation     #Health maintenance  - Folic Acid   - MVI  - Ocean nasal spray    # Difficulty with access to peripheral veins--  * Blood draws from Left arm Medline     #Precautions / PROPHYLAXIS:   - Falls  - ortho: Weight bearing status: WBAT   - Lungs: Aspiration, Incentive Spirometer   - DVT PPX: Eliquis 5 mg BID   ---------------------------    * Labs unremarkable,,stable anemia, LFT elevated, but downtrending, Downtrending leucocytosis, asymptomatic ,       Liaison with family  Patient's partner present during review, details of review d/w her, detailed explanation of therapy protocol explained and she was happy with same  10/9--Partner present during review and discussed details treatment plan with her    Liaison with providers  Consult recs form multiple specialities being implemented  Surgical consult and recs 10/9--agreed with plan for AC for Rt IJ chronic thrombus    10/10--No response to multiple calls to patient's private cardiologist Dr Otto Stratton  ph   Will discuss commencing oral anticoagulation with patient and commence     ---------------------------  IDT conference on 10/10  TDD: 10/27 to home vs EMIGDIO.  Barriers: Obesity, poor skin integrity, poor endurance, BL UE weakness, poor coordination, pain, incontinent x2.  Social Work: Lives with spouse in [place] 6 months of year. DC to apt in [place] with elevator, no steps. Supportive spouse.   DME: Has 02 compressor.   OT: Max A for eating/grooming. Total for all other ADLs and transfers. Goal of Mod A.   PT: Damaris 2-3pr A. Sitting EOB is max.   SLP: SBS with TLs. Mild cog. Severe aphonia.  RT: --n/a   Functional level on DC: Min A for transfers.  ---------------------------  OUTPATIENT/FOLLOW UP:    Trae Whitney  Pulmonary Disease  [address], [address] [place] [address]  Phone: [phone]  Fax: [phone]  Follow Up Time: 2 weeks    Ryanne Allen  Rheumatology  [address] [address]  Phone: [phone]  Fax: [phone]  Follow Up Time: 1 week    Kyle Pierce  Internal Medicine  [address] [place] [address], [address]  [place] [address]  Phone: [phone]  Fax: [phone]  Follow Up Time: 2 weeks    Addy Powers  Pulmonary Disease  [address] [facility], [facility] [address]  [place], [place] [id number]  Phone: [phone]  Fax: [phone]  Follow Up Time:     [facility]  Critical Care Medicine  [address] [facility], [address]  [place] [address]  Phone: [phone]  Fax: [phone]  Follow Up Time:     Neena Borrego  Rheumatology  [address] [facility], [address]  [place] [address]  Phone: [phone]  Fax: [phone]  Follow Up Time:    [facility]  [facility] [address]  Scheduled Appointment: 09/13/2023  2:40 PM  ---------------------------

## 2023-10-19 LAB
ALBUMIN SERPL ELPH-MCNC: 2.1 G/DL — LOW (ref 3.3–5)
ALBUMIN SERPL ELPH-MCNC: 2.1 G/DL — LOW (ref 3.3–5)
ALP SERPL-CCNC: 305 U/L — HIGH (ref 40–120)
ALP SERPL-CCNC: 305 U/L — HIGH (ref 40–120)
ALT FLD-CCNC: 132 U/L — HIGH (ref 10–45)
ALT FLD-CCNC: 132 U/L — HIGH (ref 10–45)
ANION GAP SERPL CALC-SCNC: 5 MMOL/L — SIGNIFICANT CHANGE UP (ref 5–17)
ANION GAP SERPL CALC-SCNC: 5 MMOL/L — SIGNIFICANT CHANGE UP (ref 5–17)
AST SERPL-CCNC: 49 U/L — HIGH (ref 10–40)
AST SERPL-CCNC: 49 U/L — HIGH (ref 10–40)
BASOPHILS # BLD AUTO: 0.04 K/UL — SIGNIFICANT CHANGE UP (ref 0–0.2)
BASOPHILS # BLD AUTO: 0.04 K/UL — SIGNIFICANT CHANGE UP (ref 0–0.2)
BASOPHILS NFR BLD AUTO: 0.2 % — SIGNIFICANT CHANGE UP (ref 0–2)
BASOPHILS NFR BLD AUTO: 0.2 % — SIGNIFICANT CHANGE UP (ref 0–2)
BILIRUB SERPL-MCNC: 4.2 MG/DL — HIGH (ref 0.2–1.2)
BILIRUB SERPL-MCNC: 4.2 MG/DL — HIGH (ref 0.2–1.2)
BUN SERPL-MCNC: 20 MG/DL — SIGNIFICANT CHANGE UP (ref 7–23)
BUN SERPL-MCNC: 20 MG/DL — SIGNIFICANT CHANGE UP (ref 7–23)
CALCIUM SERPL-MCNC: 8.4 MG/DL — SIGNIFICANT CHANGE UP (ref 8.4–10.5)
CALCIUM SERPL-MCNC: 8.4 MG/DL — SIGNIFICANT CHANGE UP (ref 8.4–10.5)
CHLORIDE SERPL-SCNC: 100 MMOL/L — SIGNIFICANT CHANGE UP (ref 96–108)
CHLORIDE SERPL-SCNC: 100 MMOL/L — SIGNIFICANT CHANGE UP (ref 96–108)
CO2 SERPL-SCNC: 30 MMOL/L — SIGNIFICANT CHANGE UP (ref 22–31)
CO2 SERPL-SCNC: 30 MMOL/L — SIGNIFICANT CHANGE UP (ref 22–31)
CREAT SERPL-MCNC: 0.4 MG/DL — LOW (ref 0.5–1.3)
CREAT SERPL-MCNC: 0.4 MG/DL — LOW (ref 0.5–1.3)
EGFR: 110 ML/MIN/1.73M2 — SIGNIFICANT CHANGE UP
EGFR: 110 ML/MIN/1.73M2 — SIGNIFICANT CHANGE UP
EOSINOPHIL # BLD AUTO: 0.02 K/UL — SIGNIFICANT CHANGE UP (ref 0–0.5)
EOSINOPHIL # BLD AUTO: 0.02 K/UL — SIGNIFICANT CHANGE UP (ref 0–0.5)
EOSINOPHIL NFR BLD AUTO: 0.1 % — SIGNIFICANT CHANGE UP (ref 0–6)
EOSINOPHIL NFR BLD AUTO: 0.1 % — SIGNIFICANT CHANGE UP (ref 0–6)
GLUCOSE SERPL-MCNC: 128 MG/DL — HIGH (ref 70–99)
GLUCOSE SERPL-MCNC: 128 MG/DL — HIGH (ref 70–99)
HCT VFR BLD CALC: 31.1 % — LOW (ref 34.5–45)
HCT VFR BLD CALC: 31.1 % — LOW (ref 34.5–45)
HGB BLD-MCNC: 9.7 G/DL — LOW (ref 11.5–15.5)
HGB BLD-MCNC: 9.7 G/DL — LOW (ref 11.5–15.5)
IMM GRANULOCYTES NFR BLD AUTO: 0.7 % — SIGNIFICANT CHANGE UP (ref 0–0.9)
IMM GRANULOCYTES NFR BLD AUTO: 0.7 % — SIGNIFICANT CHANGE UP (ref 0–0.9)
LYMPHOCYTES # BLD AUTO: 0.38 K/UL — LOW (ref 1–3.3)
LYMPHOCYTES # BLD AUTO: 0.38 K/UL — LOW (ref 1–3.3)
LYMPHOCYTES # BLD AUTO: 2.2 % — LOW (ref 13–44)
LYMPHOCYTES # BLD AUTO: 2.2 % — LOW (ref 13–44)
MCHC RBC-ENTMCNC: 31.2 GM/DL — LOW (ref 32–36)
MCHC RBC-ENTMCNC: 31.2 GM/DL — LOW (ref 32–36)
MCHC RBC-ENTMCNC: 32.3 PG — SIGNIFICANT CHANGE UP (ref 27–34)
MCHC RBC-ENTMCNC: 32.3 PG — SIGNIFICANT CHANGE UP (ref 27–34)
MCV RBC AUTO: 103.7 FL — HIGH (ref 80–100)
MCV RBC AUTO: 103.7 FL — HIGH (ref 80–100)
MONOCYTES # BLD AUTO: 0.57 K/UL — SIGNIFICANT CHANGE UP (ref 0–0.9)
MONOCYTES # BLD AUTO: 0.57 K/UL — SIGNIFICANT CHANGE UP (ref 0–0.9)
MONOCYTES NFR BLD AUTO: 3.2 % — SIGNIFICANT CHANGE UP (ref 2–14)
MONOCYTES NFR BLD AUTO: 3.2 % — SIGNIFICANT CHANGE UP (ref 2–14)
NEUTROPHILS # BLD AUTO: 16.42 K/UL — HIGH (ref 1.8–7.4)
NEUTROPHILS # BLD AUTO: 16.42 K/UL — HIGH (ref 1.8–7.4)
NEUTROPHILS NFR BLD AUTO: 93.6 % — HIGH (ref 43–77)
NEUTROPHILS NFR BLD AUTO: 93.6 % — HIGH (ref 43–77)
NRBC # BLD: 0 /100 WBCS — SIGNIFICANT CHANGE UP (ref 0–0)
NRBC # BLD: 0 /100 WBCS — SIGNIFICANT CHANGE UP (ref 0–0)
PLATELET # BLD AUTO: 214 K/UL — SIGNIFICANT CHANGE UP (ref 150–400)
PLATELET # BLD AUTO: 214 K/UL — SIGNIFICANT CHANGE UP (ref 150–400)
POTASSIUM SERPL-MCNC: 3.6 MMOL/L — SIGNIFICANT CHANGE UP (ref 3.5–5.3)
POTASSIUM SERPL-MCNC: 3.6 MMOL/L — SIGNIFICANT CHANGE UP (ref 3.5–5.3)
POTASSIUM SERPL-SCNC: 3.6 MMOL/L — SIGNIFICANT CHANGE UP (ref 3.5–5.3)
POTASSIUM SERPL-SCNC: 3.6 MMOL/L — SIGNIFICANT CHANGE UP (ref 3.5–5.3)
PROT SERPL-MCNC: 4.8 G/DL — LOW (ref 6–8.3)
PROT SERPL-MCNC: 4.8 G/DL — LOW (ref 6–8.3)
RBC # BLD: 3 M/UL — LOW (ref 3.8–5.2)
RBC # BLD: 3 M/UL — LOW (ref 3.8–5.2)
RBC # FLD: 17.2 % — HIGH (ref 10.3–14.5)
RBC # FLD: 17.2 % — HIGH (ref 10.3–14.5)
SODIUM SERPL-SCNC: 135 MMOL/L — SIGNIFICANT CHANGE UP (ref 135–145)
SODIUM SERPL-SCNC: 135 MMOL/L — SIGNIFICANT CHANGE UP (ref 135–145)
WBC # BLD: 17.56 K/UL — HIGH (ref 3.8–10.5)
WBC # BLD: 17.56 K/UL — HIGH (ref 3.8–10.5)
WBC # FLD AUTO: 17.56 K/UL — HIGH (ref 3.8–10.5)
WBC # FLD AUTO: 17.56 K/UL — HIGH (ref 3.8–10.5)

## 2023-10-19 PROCEDURE — 90834 PSYTX W PT 45 MINUTES: CPT

## 2023-10-19 PROCEDURE — 74018 RADEX ABDOMEN 1 VIEW: CPT | Mod: 26

## 2023-10-19 PROCEDURE — 99232 SBSQ HOSP IP/OBS MODERATE 35: CPT

## 2023-10-19 RX ORDER — LACTOBACILLUS ACIDOPHILUS 100MM CELL
1 CAPSULE ORAL
Refills: 0 | Status: DISCONTINUED | OUTPATIENT
Start: 2023-10-19 | End: 2023-12-20

## 2023-10-19 RX ADMIN — Medication 2 MILLIGRAM(S): at 06:56

## 2023-10-19 RX ADMIN — PANTOPRAZOLE SODIUM 40 MILLIGRAM(S): 20 TABLET, DELAYED RELEASE ORAL at 06:57

## 2023-10-19 RX ADMIN — GABAPENTIN 100 MILLIGRAM(S): 400 CAPSULE ORAL at 22:22

## 2023-10-19 RX ADMIN — Medication 25 MILLIGRAM(S): at 22:43

## 2023-10-19 RX ADMIN — Medication 64 MILLIGRAM(S): at 06:57

## 2023-10-19 RX ADMIN — ZINC OXIDE 1 APPLICATION(S): 200 OINTMENT TOPICAL at 12:43

## 2023-10-19 RX ADMIN — Medication 25 MILLIGRAM(S): at 06:57

## 2023-10-19 RX ADMIN — Medication 1 MILLIGRAM(S): at 12:46

## 2023-10-19 RX ADMIN — SODIUM CHLORIDE 5 MILLILITER(S): 9 INJECTION INTRAMUSCULAR; INTRAVENOUS; SUBCUTANEOUS at 07:19

## 2023-10-19 RX ADMIN — Medication 500 MILLIGRAM(S): at 12:45

## 2023-10-19 RX ADMIN — ATOVAQUONE 1500 MILLIGRAM(S): 750 SUSPENSION ORAL at 12:45

## 2023-10-19 RX ADMIN — Medication 6 MILLIGRAM(S): at 22:22

## 2023-10-19 RX ADMIN — APIXABAN 5 MILLIGRAM(S): 2.5 TABLET, FILM COATED ORAL at 06:57

## 2023-10-19 RX ADMIN — NYSTATIN CREAM 1 APPLICATION(S): 100000 CREAM TOPICAL at 07:10

## 2023-10-19 RX ADMIN — ZINC OXIDE 1 APPLICATION(S): 200 OINTMENT TOPICAL at 07:10

## 2023-10-19 RX ADMIN — Medication 1 TABLET(S): at 19:05

## 2023-10-19 RX ADMIN — SODIUM CHLORIDE 5 MILLILITER(S): 9 INJECTION INTRAMUSCULAR; INTRAVENOUS; SUBCUTANEOUS at 19:30

## 2023-10-19 RX ADMIN — Medication 20 MILLIGRAM(S): at 22:23

## 2023-10-19 RX ADMIN — Medication 1 TABLET(S): at 12:45

## 2023-10-19 RX ADMIN — APIXABAN 5 MILLIGRAM(S): 2.5 TABLET, FILM COATED ORAL at 19:05

## 2023-10-19 NOTE — PROGRESS NOTE ADULT - SUBJECTIVE AND OBJECTIVE BOX
Patient seen at bedside for 42-minute, supportive psychotherapy session. Her partner, Kiara, is present per patient preference and participates along with patient.     Patient expresses frustration regarding lack of functional movement in her arms/hands. She indicates sitting up in the wheelchair is difficult and painful, due to discomfort in her legs despite repositioning. She reports feelings of guilt associated with caregiver burden on her spouse. Patient's partner also expresses desire to be present, as patient cannot move her arms and help herself, though acknowledges she is also frustrated. Together they are trying to problem solve how to provide caregiver respite and ensure patient's needs are met (i.e., another person in the room).

## 2023-10-19 NOTE — PROGRESS NOTE ADULT - ASSESSMENT
Assessment/Plan:  ALIZA FOLEY is a 62 year old female with PMH of pAFIB and sciatica; who presented to [facility] ED with SOB, and was found to have groundglass opacities and interstitial lung disease. She was treated with empiric Vancomycin and Zosyn. She underwent a bronchoscopy with bronchoalveolar lavage and pulse steroids. She was given IV diuretics for increased pulmonary congestion, but her respiratory status continued to worsen.  On 9/13, she was transferred to [facility] for ECMO requirements. She was further treated with Plasmapheresis, Rituximab and high-dose steroids, with significant improvement. Her overall hospital course was complicated by thrombocytopenia (s/p several platelet transfusions), leukocytosis (infectious workup entirely negative- s/p Vanco and Ceftriaxone), AFIB with RVR (resolved with Metoprolol), dysphagia (requiring NGT), transaminitis (secondary to acute illness and hypotension),  non-occlusive RIJ DVT (started on Lovenox). Patient now admitted for a multidisciplinary rehab program. 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  - Gait Instability, ADL impairments and Functional impairments: start Comprehensive Rehab Program of PT/OT/SLP - 3 hours a day, 5 days a week  - P&O as needed   - Non-specific Interstitial Lung Disease  - Requiring ECMO   - Improvement s/p Plasmapheresis, Rituximab and high- dose steroids   - Albuterol/Ipratropium Nebulizer every 6 hours  - Mepron 1500mg daily  - PO Medrol ( due to liver dysfunction): Plan will be to taper by ~20% every 2 weeks  Medrol   64mg x 2 weeks  56mg x 2 weeks  44mg x 2 weeks   36mg x 2 weeks - Follow up Outpatient   -- Repeat CT Chest in 6 weeks   - Pulmonary following   -Perform BL elbow flexion & elbow extension with antigravity in therapy     10/18:  Meeting at bed side  We had a brief meeting at bed side at this time  We discussed details from IDT including projected dc date of 10/27, (CMG 10/21), but the extended time was to make up for time spent addressing medical issues on acute rehab admission, during which he got limited therapy  They appreciated this  But they has some requests  1--To explore extension of acute rehab treatment by at 2 wk period from 10/27, with the intention of home dc afterwards  2--They are willing to personally pay, depending on the cost if insurance will not cover this  3--If both requests not possible, they will agree to EM[facility]DIO provided patient's partner will be allowed to care for patient in same room in the EMIGDIO facility (they report being told that in [facility], this arrangement will not be possible) if that is true, they would pre cody [facility] or [facility]  She agrees to continue therapy while SW team explores these options    SUBSEQUENT MEETING WITH  NURSE MANAGER AND SUPERVISION, NP and SW  We discussed patient's/family's requests  Plan is to explore these requests and update patient on possibilities of each    #pAFIB/Rt Ij thrombus  - Metoprolol 25mg BID and lovenox  -- Cardiology consult--recm can switch to oral AC  --Await discussion with patient's cardiologist before switch to oral AC as requested by patient   (recent GI bleed, from hemorrhoid, resolved)    # Chronic Tinnitus   - ENT consult for hx of tinnitus and feeling of ear fullness  - ENT review and recs appreciate, Patient already made ENT appointment for f/u    # Lymphedema both legs -chronic and Rt IJ thrombus  - ACE Wrap BL LEs  - BL LE doppler negative for DVT  - BL UE doppler with chronic thrombotic changes in RIJ   - Surg consult recs--commence anticoagulation as recs by cardiology  - Eliquis 5mg BID - tolerating well     #Transaminitis --LFT Down trending   - Secondary to acute illness and hypotension at [facility]  - Trend LFTs  - Outpatient follow up     #Neuropathy  --continue gabapentin 10/16 and E stim, tolerating gabapentin, willing to continue trial of E stim for now  --Work on motor strengthening, priority for UE as preferred by patient     #Sleep  - Melatonin 6mg at HS    #Skin  - Skin: Left lower abdomen ruptured blister 3.0 x 1.0, stage 2 pressure injury to right buttock 4 x1 and left buttock 3x1, stage 2 pressure injury ti right inner thigh 0.2 x 0.2, right groin wound 10.5 x 2.0, Left groin wound 5 x 5,  right neck scab wound  -- MAD to skin folds- Nystatin to submammary and abdominal pannus BID  -- MAD to L groin- cleanse with NS, Triad cream daily  -- Mad to R groin- Nystatin powder daily   -- R groin wound-- aquacel packing, Triad to periwound, Allevyn foam- daily and PRN   - Pressure injury/Skin: OOB to Chair, PT/OT   - Offload pressure, low air loss support surfaces, T&P every 2 hours   --Wound care consult-10/9 -Multiple wounds--perineal and buttock/sacral, recs appreciated   --Suggest Continue treatments as below:   Twice daily & PRN Normal Saline Cleanse of abdominal pannus & breast folds, perineal, Left & Right inner buttock erosions.  Pat thoroughly dry.   Apply Desitin generously twice daily (& PRN) to perineal, buttocks, and left groin erosions, leave open to air.    Apply Nystatin powder to abdominal pannus and breast folds.  Suggest use of Interdry cloths in folds to 'wick' moisture.  Suggest daily Normal Saline Cleanse of right groin surgical wound, pat dry.  Apply Cavillon film barrier to intact periwound skin.  Place Aquacell strip over open area, cover with foam dressing.  - Wound care following     #Pain Mgmt   - Tylenol PRN   - Gabapentin 100mg at HS     #GI/Bowel Mgmt   - Pantoprazole  - Senna  --on hold due to recent Loose BM  - PRN: Maalox   - Abdominal XR to assess stool burden   - Stool count  - Start Lactobacillus BID     #/Bladder Mgmt --voiding     #FEN   #Dysphagia  - Diet - Minced & Moist  [CC]    - Dysphaiga- SLP evaluation     #Health maintenance  - Folic Acid   - MVI  - Ocean nasal spray    # Difficulty with access to peripheral veins--  * Blood draws from Left arm Medline     #Precautions / PROPHYLAXIS:   - Falls  - ortho: Weight bearing status: WBAT   - Lungs: Aspiration, Incentive Spirometer   - DVT PPX: Eliquis 5 mg BID   ---------------------------    * Labs unremarkable,,stable anemia, LFT elevated, but downtrending, Downtrending leucocytosis, asymptomatic ,       Liaison with family  Patient's partner present during review, details of review d/w her, detailed explanation of therapy protocol explained and she was happy with same  10/9--Partner present during review and discussed details treatment plan with her    Liaison with providers  Consult recs form multiple specialities being implemented  Surgical consult and recs 10/9--agreed with plan for AC for Rt IJ chronic thrombus    10/10--No response to multiple calls to patient's private cardiologist Dr Otto Stratton  ph   Will discuss commencing oral anticoagulation with patient and commence     ---------------------------  IDT conference on 10/10  TDD: 10/27 to home vs EMIGDIO.  Barriers: Obesity, poor skin integrity, poor endurance, BL UE weakness, poor coordination, pain, incontinent x2.  Social Work: Lives with spouse in [place] 6 months of year. DC to apt in [place] with elevator, no steps. Supportive spouse.   DME: Has 02 compressor.   OT: Max A for eating/grooming. Total for all other ADLs and transfers. Goal of Mod A.   PT: Damaris 2-3pr A. Sitting EOB is max.   SLP: SBS with TLs. Mild cog. Severe aphonia.  RT: --n/a   Functional level on DC: Min A for transfers.  ---------------------------  OUTPATIENT/FOLLOW UP:    Trae Whitney  Pulmonary Disease  [address], [address] [place] [address]  Phone: [phone]  Fax: [phone]  Follow Up Time: 2 weeks    Ryanne Allen  Rheumatology  [address] [address]  Phone: [phone]  Fax: [phone]  Follow Up Time: 1 week    Kyle Pierce  Internal Medicine  [address] [place] [address], [address]  [place] [address]  Phone: [phone]  Fax: [phone]  Follow Up Time: 2 weeks    Addy Powers  Pulmonary Disease  [address] [facility], [address]  [place], [place] [id number]  Phone: [phone]  Fax: [phone]  Follow Up Time:     Kwame [place]  Critical Care Medicine  [address] [facility], [address]  [place] [address]  Phone: [phone]  Fax: [phone]  Follow Up Time:     Neena Borrego  Rheumatology  [address] [facility], [address] 3  [place] [id number]  Phone: [phone]  Fax: [phone]  Follow Up Time:    [facility]  [facility] [address]  Scheduled Appointment: 09/13/2023  2:40 PM  ---------------------------   Assessment/Plan:  ALIZA FOLEY is a 62 year old female with PMH of pAFIB and sciatica; who presented to [facility] ED with SOB, and was found to have groundglass opacities and interstitial lung disease. She was treated with empiric Vancomycin and Zosyn. She underwent a bronchoscopy with bronchoalveolar lavage and pulse steroids. She was given IV diuretics for increased pulmonary congestion, but her respiratory status continued to worsen.  On 9/13, she was transferred to [facility] for ECMO requirements. She was further treated with Plasmapheresis, Rituximab and high-dose steroids, with significant improvement. Her overall hospital course was complicated by thrombocytopenia (s/p several platelet transfusions), leukocytosis (infectious workup entirely negative- s/p Vanco and Ceftriaxone), AFIB with RVR (resolved with Metoprolol), dysphagia (requiring NGT), transaminitis (secondary to acute illness and hypotension),  non-occlusive RIJ DVT (started on Lovenox). Patient now admitted for a multidisciplinary rehab program. 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  - Gait Instability, ADL impairments and Functional impairments: start Comprehensive Rehab Program of PT/OT/SLP - 3 hours a day, 5 days a week  - P&O as needed   - Non-specific Interstitial Lung Disease  - Requiring ECMO   - Improvement s/p Plasmapheresis, Rituximab and high- dose steroids   - Albuterol/Ipratropium Nebulizer every 6 hours  - Mepron 1500mg daily  - PO Medrol ( due to liver dysfunction): Plan will be to taper by ~20% every 2 weeks  Medrol   64mg x 2 weeks  56mg x 2 weeks  44mg x 2 weeks   36mg x 2 weeks - Follow up Outpatient   -- Repeat CT Chest in 6 weeks   - Pulmonary following   -Perform BL elbow flexion & elbow extension with antigravity in therapy       10/18:  Meeting at bed side  We had a brief meeting at bed side at this time  We discussed details from IDT including projected dc date of 10/27, (CMG 10/21), but the extended time was to make up for time spent addressing medical issues on acute rehab admission, during which he got limited therapy  They appreciated this  But they has some requests  1--To explore extension of acute rehab treatment by at 2 wk period from 10/27, with the intention of home dc afterwards  2--They are willing to personally pay, depending on the cost if insurance will not cover this  3--If both requests not possible, they will agree to EM[facility]IO provided patient's partner will be allowed to care for patient in same room in the [facility] facility (they report being told that in [facility], this arrangement will not be possible) if that is true, they would pre cody [facility] or [facility]  She agrees to continue therapy while SW team explores these options    SUBSEQUENT MEETING WITH  NURSE MANAGER AND SUPERVISION, NP and SW  We discussed patient's/family's requests  Plan is to explore these requests and update patient on possibilities of each    #pAFIB/Rt Ij thrombus  - Metoprolol 25mg BID and lovenox  -- Cardiology consult--recm can switch to oral AC  --Await discussion with patient's cardiologist before switch to oral AC as requested by patient   (recent GI bleed, from hemorrhoid, resolved)    # Chronic Tinnitus   - ENT consult for hx of tinnitus and feeling of ear fullness  - ENT review and recs appreciate, Patient already made ENT appointment for f/u    # Lymphedema both legs -chronic and Rt IJ thrombus  - ACE Wrap BL LEs  - BL LE doppler negative for DVT  - BL UE doppler with chronic thrombotic changes in RIJ   - Surg consult recs--commence anticoagulation as recs by cardiology  - Eliquis 5mg BID - tolerating well     #Transaminitis --LFT Down trending   - Secondary to acute illness and hypotension at [facility]  - Outpatient follow up     #Neuropathy  --continue gabapentin 10/16 and E stim, tolerating gabapentin, willing to continue trial of E stim for now  --Work on motor strengthening, priority for UE as preferred by patient     #Sleep  - Melatonin 6mg at HS    #Skin  - Skin: Left lower abdomen ruptured blister 3.0 x 1.0, stage 2 pressure injury to right buttock 4 x1 and left buttock 3x1, stage 2 pressure injury ti right inner thigh 0.2 x 0.2, right groin wound 10.5 x 2.0, Left groin wound 5 x 5,  right neck scab wound  -- MAD to skin folds- Nystatin to submammary and abdominal pannus BID  -- MAD to L groin- cleanse with NS, Triad cream daily  -- Mad to R groin- Nystatin powder daily   -- R groin wound-- aquacel packing, Triad to periwound, Allevyn foam- daily and PRN   - Pressure injury/Skin: OOB to Chair, PT/OT   - Offload pressure, low air loss support surfaces, T&P every 2 hours   --Wound care consult-10/9 -Multiple wounds--perineal and buttock/sacral, recs appreciated   --Suggest Continue treatments as below:   Twice daily & PRN Normal Saline Cleanse of abdominal pannus & breast folds, perineal, Left & Right inner buttock erosions.  Pat thoroughly dry.   Apply Desitin generously twice daily (& PRN) to perineal, buttocks, and left groin erosions, leave open to air.    Apply Nystatin powder to abdominal pannus and breast folds.  Suggest use of Interdry cloths in folds to 'wick' moisture.  Suggest daily Normal Saline Cleanse of right groin surgical wound, pat dry.  Apply Cavillon film barrier to intact periwound skin.  Place Aquacell strip over open area, cover with foam dressing.  - Wound care following     #Pain Mgmt   - Tylenol PRN   - Gabapentin 100mg at HS     #GI/Bowel Mgmt   - Pantoprazole  - Senna  --on hold due to recent Loose BM  - PRN: Maalox   - Abdominal XR to assess stool burden   - Stool count  - Start Lactobacillus BID     # Alternating bowel function --commenced probiotic, latobacilus   * Leucocytosis probably steroid related and partly due to her Mixed Connective Tissue Disease, will continue monitoring     #/Bladder Mgmt --voiding     #FEN   #Dysphagia  - Diet - Minced & Moist  [CC]    - Dysphaiga- SLP evaluation     #Health maintenance  - Folic Acid   - MVI  - Ocean nasal spray    # Difficulty with access to peripheral veins-- Blood draws from Left arm Medline     #Precautions / PROPHYLAXIS:   - Falls  - ortho: Weight bearing status: WBAT   - Lungs: Aspiration, Incentive Spirometer   - DVT PPX: Eliquis 5 mg BID   ---------------------------    * 10/19 Labs unremarkable,,stable anemia, LFT elevated, but downtrending, leucocytosis, non progressive    Liaison with family  Patient's partner present during review, details of review d/w her, detailed explanation of therapy protocol explained and she was happy with same  10/9--Partner present during review and discussed details treatment plan with her    Liaison with providers  Consult recs form multiple specialities being implemented  Surgical consult and recs 10/9--agreed with plan for AC for Rt IJ chronic thrombus    10/10--No response to multiple calls to patient's private cardiologist Dr Otto Stratton  ph   Will discuss commencing oral anticoagulation with patient and commence     ---------------------------  IDT conference on 10/10  TDD: 10/27 to home vs EMIGDIO.  Barriers: Obesity, poor skin integrity, poor endurance, BL UE weakness, poor coordination, pain, incontinent x2.  Social Work: Lives with spouse in [place] 6 months of year. DC to apt in [place] with elevator, no steps. Supportive spouse.   DME: Has 02 compressor.   OT: Max A for eating/grooming. Total for all other ADLs and transfers. Goal of Mod A.   PT: Damaris 2-3pr A. Sitting EOB is max.   SLP: SBS with TLs. Mild cog. Severe aphonia.  RT: --n/a   Functional level on DC: Min A for transfers.  ---------------------------  OUTPATIENT/FOLLOW UP:    Trae Whitney  Pulmonary Disease  [address], [address] [place] [address]  Phone: [phone]  Fax: [phone]  Follow Up Time: 2 weeks    Ryanne Allen  Rheumatology  [address] [address]  Phone: [phone]  Fax: [phone]  Follow Up Time: 1 week    Kyle Pierce  Internal Medicine  [address] [place] [address], [address]  [place] [address]  Phone: [phone]  Fax: [phone]  Follow Up Time: 2 weeks    Addy Powers  Pulmonary Disease  [address] [facility], [address]  [place], [place] [address]  Phone: [phone]  Fax: [phone]  Follow Up Time:     Kwame [place]  Critical Care Medicine  [address] [place] [address] [address]  [place] [address]  Phone: [phone]  Fax: [phone]  Follow Up Time:     Neena Borrego  Rheumatology  [address] [place] [address], [address]  [place] [address]  Phone: [phone]  Fax: [phone]  Follow Up Time:    [facility]  [facility] [address]  Scheduled Appointment: 09/13/2023  2:40 PM  ---------------------------

## 2023-10-19 NOTE — PROGRESS NOTE ADULT - ASSESSMENT
62 year old female with PMH of pAFIB and sciatica; who presented to [facility] ED with SOB, and was found to have groundglass opacities and interstitial lung disease. She was treated with empiric Vancomycin and Zosyn. She underwent a bronchoscopy with bronchoalveolar lavage and pulse steroids. She was given IV diuretics for increased pulmonary congestion, but her respiratory status continued to worsen.  On 9/13, she was transferred to [facility] for ECMO requirements. She was further treated with Plasmapheresis, Rituximab and high-dose steroids, with significant improvement. Her overall hospital course was complicated by thrombocytopenia (s/p several platelet transfusions), leukocytosis (infectious workup entirely negative- s/p Vanco and Ceftriaxone), AFIB with RVR (resolved with Metoprolol), dysphagia (requiring NGT), transaminitis (secondary to acute illness and hypotension),  non-occlusive RIJ DVT (started on Lovenox). Patient now admitted for a multidisciplinary rehab program- pt/ot/dvt ppx    Gait Instability/Functional Impairment  Interstitial Lung Disease, s/p ECMO   Mixed Connective Tissue Disease   Acute Hypoxia   -s/p Plasmapheresis, Rituximab and high- dose steroids   -Albuterol/Ipratropium Nebulizer every 6 hours PRN  -Continue PO Medrol taper- Plan will be to taper by ~20% every 2 weeks  -Continue PPx Mepron   -Repeat CT Chest in 6 weeks (~11/11)  -O2 supplementation for O2Sat >92%  -Cardiology consult appreciated   -Pulmonary following     Leukocytosis   -May be related to steroids, she has been afebrile and hemodynamically stable  -Monitor CBC    Diarrhea  -imodium prn. no recent antibiotic use     Anemia  -Likely multifactorial  -H/H stable  -Monitor CBC     pAFIB  -Continue Metoprolol   -Continue Eliquis     RIJ DVT found at ECMO cannula   -RUE duplex 10/6 showed No evidence of right upper extremity deep venous thrombosis. 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  -Continue Eliquis   -Vascular consult appreciated     Transaminitis, jaundice  -Secondary to acute illness and hypotension at [facility]  -Trend LFTs, bili-  downtrending  -Avoid hepatoxic agents   -Outpatient follow up     Tinnitus/Epistaxis   -Will need outpatient ENT follow up for tinnitus  -No further epistaxis, continue humidified O2  -ENT following    Chronic LE Lymphedema  -Bilateral LE duplex negative for DVT  -ACE wraps as tolerated  -Leg elevation encouraged     Prediabetes  -HbA1C 6.1 on 8/27  -Dietary/lifestyle modifications  -Repeat HbA1C with PMD on discharge     Sleep  -Melatonin    DVT PPx  -on Eliquis    GI PPx  -On PPI

## 2023-10-19 NOTE — PROGRESS NOTE ADULT - NS ATTEND AMEND GEN_ALL_CORE FT
Seen and examined  Note revised      Alternative bowel function--multiple semisolid stool, followed by loose BM, all preceeded by constipation   No fever, but feeling of gas in abdomen    Labs persisting but stable leucocytosis multifactorial--connective tissue disease, steroid induces    Continue therapy including E stim UE and ROM elbows gravity eliminated  Commence probiotic  Await Abd xray r/o stool burden or overflow diarrhea  Await response from SW and Nursing regarding dc planning

## 2023-10-19 NOTE — PROGRESS NOTE ADULT - SUBJECTIVE AND OBJECTIVE BOX
Patient is a 62y old  Female who presents with a chief complaint of Interstitial Lung Disease (18 Oct 2023 10:22)    Patient seen and examined at bedside. Endorses abdominal cramping and 5 BMs since yesterday afternoon. States BMs were solid-formed and now are watery, most recent this morning.   Denies fever, abdominal pain.     ALLERGIES:  No Known Allergies    MEDICATIONS  (STANDING):  apixaban 5 milliGRAM(s) Oral every 12 hours  artificial  tears Solution 1 Drop(s) Both EYES every 12 hours  ascorbic acid 500 milliGRAM(s) Oral daily  atovaquone  Suspension 1500 milliGRAM(s) Oral daily  dextrose 5%. 1000 milliLiter(s) (50 mL/Hr) IV Continuous <Continuous>  dextrose 5%. 1000 milliLiter(s) (100 mL/Hr) IV Continuous <Continuous>  dextrose 50% Injectable 12.5 Gram(s) IV Push once  dextrose 50% Injectable 25 Gram(s) IV Push once  dextrose 50% Injectable 25 Gram(s) IV Push once  folic acid 1 milliGRAM(s) Oral daily  gabapentin 100 milliGRAM(s) Oral at bedtime  glucagon  Injectable 1 milliGRAM(s) IntraMuscular once  hydrocortisone hemorrhoidal Suppository 1 Suppository(s) Rectal two times a day  influenza   Vaccine 0.5 milliLiter(s) IntraMuscular once  lactobacillus acidophilus 1 Tablet(s) Oral two times a day with meals  melatonin 6 milliGRAM(s) Oral at bedtime  metoprolol tartrate 25 milliGRAM(s) Oral two times a day  multivitamin 1 Tablet(s) Oral daily  nystatin Powder 1 Application(s) Topical two times a day  pantoprazole    Tablet 40 milliGRAM(s) Oral before breakfast  psyllium Powder 1 Packet(s) Oral daily  senna 2 Tablet(s) Oral at bedtime  sodium chloride 0.9% lock flush 5 milliLiter(s) IV Push two times a day  zinc oxide 40% Paste 1 Application(s) Topical three times a day    MEDICATIONS  (PRN):  albuterol/ipratropium for Nebulization 3 milliLiter(s) Nebulizer every 6 hours PRN Shortness of Breath and/or Wheezing  aluminum hydroxide/magnesium hydroxide/simethicone Suspension 30 milliLiter(s) Oral every 4 hours PRN Dyspepsia  dextrose Oral Gel 15 Gram(s) Oral once PRN Blood Glucose LESS THAN 70 milliGRAM(s)/deciliter  loperamide 2 milliGRAM(s) Oral three times a day PRN Diarrhea  polyethylene glycol 3350 17 Gram(s) Oral daily PRN Constipation  SIMETHICONE SOFTGELS 250 milliGRAM(s) 250 milliGRAM(s) Oral three times a day PRN for gas  sodium chloride 0.65% Nasal 1 Spray(s) Both Nostrils every 8 hours PRN Nasal Congestion    Vital Signs Last 24 Hrs  T(F): --  HR: 72 (19 Oct 2023 06:51) (72 - 72)  BP: 105/70 (19 Oct 2023 06:51) (105/70 - 105/70)  RR: 16 (19 Oct 2023 06:51) (16 - 16)  SpO2: 97% (19 Oct 2023 06:51) (97% - 97%)  I&O's Summary    PHYSICAL EXAM:  General: NAD, A/O x 3  ENT: MMM, no tonsilar exudate  Neck: Supple, No JVD  Lungs: Clear to auscultation bilaterally, no wheezes. Good air entry bilaterally   Cardio: RRR, S1/S2, No murmurs  Abdomen: Soft, Nontender, Nondistended; Bowel sounds present  Extremities: No calf tenderness, non-piting edema b/l LE    LABS:                        9.7    17.56 )-----------( 214      ( 19 Oct 2023 08:40 )             31.1       10-19    135  |  100  |  20  ----------------------------<  128  3.6   |  30  |  0.40    Ca    8.4      19 Oct 2023 08:40    TPro  4.8  /  Alb  2.1  /  TBili  4.2  /  DBili  x   /  AST  49  /  ALT  132  /  AlkPhos  305  10-19     09-25 Chol -- LDL -- HDL -- Trig 199 mg/dL    Urinalysis Basic - ( 19 Oct 2023 08:40 )    Color: x / Appearance: x / SG: x / pH: x  Gluc: 128 mg/dL / Ketone: x  / Bili: x / Urobili: x   Blood: x / Protein: x / Nitrite: x   Leuk Esterase: x / RBC: x / WBC x   Sq Epi: x / Non Sq Epi: x / Bacteria: x    COVID-19 PCR: NotDetec (10-05-23 @ 20:17)    RADIOLOGY & ADDITIONAL TESTS:     Care Discussed with Consultants/Other Providers:

## 2023-10-19 NOTE — PROGRESS NOTE ADULT - ASSESSMENT
Patient is in her bed. She is alert and oriented. Mood is frustrated/irritable, affect is serious. Patient indicates she is emotionally sensitive to what others say. She reports stomach discomfort today, which is being addressed by medical team. Themes discussed include patient's adaptive coping with stress, improving communication style, and expectations.      Cognitive-behavioral approach is used to address the relationship between thoughts and emotions. Support and encouragement are provided.     Plan: Individual psychotherapy to address adjustment. Coordination of care with PM&R. Neuropsychology remains available through discharge.

## 2023-10-19 NOTE — PROGRESS NOTE ADULT - SUBJECTIVE AND OBJECTIVE BOX
CC: Non-specific Interstitial Lung Disease     Today's Subjective & Objective Findings:  Patient seen and examined at bedside this AM with Dr López.  Partner present at bedside  Reports poor sleep.  Last BM on 10/19, multiple episodes, per patient.   Discussed probiotic.   Wishes to extend DC 2 additional weeks from 10/27 DC date.    ROS --No dyspnea, head ache, chest or abd pain    Therapy-- engaging, motivated  Reports improvement with muscle activity at both upper back muscles last night, attributes it to success with E stim  Happy with continued reduction of LE edema with ace wrap and therapy        Vital Signs Last 24 Hrs  T(C): --  T(F): --  HR: 72 (19 Oct 2023 06:51) (72 - 72)  BP: 105/70 (19 Oct 2023 06:51) (105/70 - 105/70)  BP(mean): --  RR: 16 (19 Oct 2023 06:51) (16 - 16)  SpO2: 97% (19 Oct 2023 06:51) (97% - 97%)      PHYSICAL EXAM:  Gen -  Comfortable, sitting on a WC, Oxy sats normal on NC oxy 3L, oxy sats  persistently > 93 %  HEENT - , nostrils are moist, no bleeding, mod icterus  Neck - Supple, No limited ROM, O2 via NC  Pulm - good   Cardiovascular - RRR, S1S2  Chest - good chest expansion,   Abdomen - Soft, non tender, +BS, scattered ecchymosis on abd  Extremities - No Cyanosis, no clubbing, 1+ edema to b/l feet, non pitting,    no calf tenderness, LUE Med line    Neuro-     Cognitive - awake, alert, fully oriented, engaging, speech normal      Motor -                    LEFT    UE - 4/5                    RIGHT UE - 4/5                     LEFT    LE - 2 +-/5 limited by leg edema                    RIGHT LE - 2+-/5  limitted by leg edema,, which is reducing      Sensory - Intact        Reflexes - DTR 1+      Coordination - FTN  impaired  due to weakness   MSK: soreness and weakness  Psychiatric - Mood stable, Affect WNL  Skin: Left lower abdomen ruptured blister 3.0 x 1.0, stage 2 pressure injury to right buttock 4 x1 and left buttock 3x1, stage 2 pressure injury ti right inner thigh 0.2 x 0.2, right groin wound 10.5 x 2.0, Left groin wound 5 x 5,      MMT--Able to contact B/L upper back muscles,   Elbow abduction and adduction, gravity eliminated 2+      LABS:                        9.7    17.56 )-----------( 214      ( 19 Oct 2023 08:40 )             31.1     10-19    135  |  100  |  20  ----------------------------<  128<H>  3.6   |  30  |  0.40<L>    Ca    8.4      19 Oct 2023 08:40    TPro  4.8<L>  /  Alb  2.1<L>  /  TBili  4.2<H>  /  DBili  x   /  AST  49<H>  /  ALT  132<H>  /  AlkPhos  305<H>  10-19      Urinalysis Basic - ( 19 Oct 2023 08:40 )    Color: x / Appearance: x / SG: x / pH: x  Gluc: 128 mg/dL / Ketone: x  / Bili: x / Urobili: x   Blood: x / Protein: x / Nitrite: x   Leuk Esterase: x / RBC: x / WBC x   Sq Epi: x / Non Sq Epi: x / Bacteria: x      MEDICATIONS  (STANDING):  apixaban 5 milliGRAM(s) Oral every 12 hours  artificial  tears Solution 1 Drop(s) Both EYES every 12 hours  ascorbic acid 500 milliGRAM(s) Oral daily  atovaquone  Suspension 1500 milliGRAM(s) Oral daily  dextrose 5%. 1000 milliLiter(s) (50 mL/Hr) IV Continuous <Continuous>  dextrose 5%. 1000 milliLiter(s) (100 mL/Hr) IV Continuous <Continuous>  dextrose 50% Injectable 25 Gram(s) IV Push once  dextrose 50% Injectable 25 Gram(s) IV Push once  dextrose 50% Injectable 12.5 Gram(s) IV Push once  folic acid 1 milliGRAM(s) Oral daily  gabapentin 100 milliGRAM(s) Oral at bedtime  glucagon  Injectable 1 milliGRAM(s) IntraMuscular once  hydrocortisone hemorrhoidal Suppository 1 Suppository(s) Rectal two times a day  influenza   Vaccine 0.5 milliLiter(s) IntraMuscular once  lactobacillus acidophilus 1 Tablet(s) Oral two times a day with meals  melatonin 6 milliGRAM(s) Oral at bedtime  metoprolol tartrate 25 milliGRAM(s) Oral two times a day  multivitamin 1 Tablet(s) Oral daily  nystatin Powder 1 Application(s) Topical two times a day  pantoprazole    Tablet 40 milliGRAM(s) Oral before breakfast  psyllium Powder 1 Packet(s) Oral daily  senna 2 Tablet(s) Oral at bedtime  sodium chloride 0.9% lock flush 5 milliLiter(s) IV Push two times a day  zinc oxide 40% Paste 1 Application(s) Topical three times a day    MEDICATIONS  (PRN):  albuterol/ipratropium for Nebulization 3 milliLiter(s) Nebulizer every 6 hours PRN Shortness of Breath and/or Wheezing  aluminum hydroxide/magnesium hydroxide/simethicone Suspension 30 milliLiter(s) Oral every 4 hours PRN Dyspepsia  dextrose Oral Gel 15 Gram(s) Oral once PRN Blood Glucose LESS THAN 70 milliGRAM(s)/deciliter  loperamide 2 milliGRAM(s) Oral three times a day PRN Diarrhea  polyethylene glycol 3350 17 Gram(s) Oral daily PRN Constipation  SIMETHICONE SOFTGELS 250 milliGRAM(s) 250 milliGRAM(s) Oral three times a day PRN for gas  sodium chloride 0.65% Nasal 1 Spray(s) Both Nostrils every 8 hours PRN Nasal Congestion   CC: Non-specific Interstitial Lung Disease     Today's Subjective & Objective Findings:  Patient seen and examined at bedside this AM with Dr López.  Partner present at bedside  Reports poor sleep.  Last BM on 10/19, multiple episodes, per patient.   She reports long personal and family hx of alternating bowel function   Discussed benefits of probiotic. and she agreed to this   Wishes to extend DC 2 additional weeks from 10/27 DC date and SW/nursing management is exploring this and will give update to patient  Nursing admin reports shortage of diapers and patient working with staff with regards to efficient use of current limited supplies    ROS --No dyspnea, head ache, chest or abd pain    Therapy-- engaging, motivated  Reports therapy yesterday as being intense and she enjoyed it, improvement in upper back muscles still present  Will continue to engage for  E stim to upper extremity muscles     Neuropsych review appreciated, working with patient, addressing her guilt feelings due to burden of care giving on her partner and distress due to her overall functional deficits  CBT being uses to address psychological deficits    Vital Signs Last 24 Hrs    HR: 72 (19 Oct 2023 06:51) (72 - 72)  BP: 105/70 (19 Oct 2023 06:51) (105/70 - 105/70)  RR: 16 (19 Oct 2023 06:51) (16 - 16)  SpO2: 97% (19 Oct 2023 06:51) (97% - 97%)      PHYSICAL EXAM:  Gen -  Comfortable, sitting on a WC, Oxy sats normal on NC oxy 3L, oxy sats  persistently > 93 %  HEENT - , nostrils are moist, no bleeding, mod icterus  Neck - Supple, No limited ROM, O2 via NC  Pulm - good   Cardiovascular - RRR, S1S2  Chest - good chest expansion,   Abdomen - Soft, non tender, +BS, scattered ecchymosis on abd  Extremities - No Cyanosis, no clubbing, 1+ edema to b/l feet, non pitting,    no calf tenderness, LUE Med line    Neuro-     Cognitive - awake, alert, fully oriented, engaging, speech normal      Motor -                    LEFT    UE - 4/5                    RIGHT UE - 4/5                     LEFT    LE - 2 +-/5 limited by leg edema                    RIGHT LE - 2+-/5  limitted by leg edema,, which is reducing      Sensory - Intact        Reflexes - DTR 1+      Coordination - FTN  impaired  due to weakness   MSK: soreness and weakness  Psychiatric - Mood stable, Affect WNL  Skin: Left lower abdomen ruptured blister 3.0 x 1.0, stage 2 pressure injury to right buttock 4 x1 and left buttock 3x1, stage 2 pressure injury ti right inner thigh 0.2 x 0.2, right groin wound 10.5 x 2.0, Left groin wound 5 x 5,      MMT--Able to contact B/L upper back muscles,   Elbow abduction and adduction, gravity eliminated 2+      LABS:                        9.7    17.56 )-----------( 214      ( 19 Oct 2023 08:40 )             31.1     10-19    135  |  100  |  20  ----------------------------<  128<H>  3.6   |  30  |  0.40<L>    Ca    8.4      19 Oct 2023 08:40    TPro  4.8<L>  /  Alb  2.1<L>  /  TBili  4.2<H>  /  DBili  x   /  AST  49<H>  /  ALT  132<H>  /  AlkPhos  305<H>  10-19      Urinalysis Basic - ( 19 Oct 2023 08:40 )    Color: x / Appearance: x / SG: x / pH: x  Gluc: 128 mg/dL / Ketone: x  / Bili: x / Urobili: x   Blood: x / Protein: x / Nitrite: x   Leuk Esterase: x / RBC: x / WBC x   Sq Epi: x / Non Sq Epi: x / Bacteria: x      MEDICATIONS  (STANDING):  apixaban 5 milliGRAM(s) Oral every 12 hours  artificial  tears Solution 1 Drop(s) Both EYES every 12 hours  ascorbic acid 500 milliGRAM(s) Oral daily  atovaquone  Suspension 1500 milliGRAM(s) Oral daily  dextrose 5%. 1000 milliLiter(s) (50 mL/Hr) IV Continuous <Continuous>  dextrose 5%. 1000 milliLiter(s) (100 mL/Hr) IV Continuous <Continuous>  dextrose 50% Injectable 25 Gram(s) IV Push once  dextrose 50% Injectable 25 Gram(s) IV Push once  dextrose 50% Injectable 12.5 Gram(s) IV Push once  folic acid 1 milliGRAM(s) Oral daily  gabapentin 100 milliGRAM(s) Oral at bedtime  glucagon  Injectable 1 milliGRAM(s) IntraMuscular once  hydrocortisone hemorrhoidal Suppository 1 Suppository(s) Rectal two times a day  influenza   Vaccine 0.5 milliLiter(s) IntraMuscular once  lactobacillus acidophilus 1 Tablet(s) Oral two times a day with meals  melatonin 6 milliGRAM(s) Oral at bedtime  metoprolol tartrate 25 milliGRAM(s) Oral two times a day  multivitamin 1 Tablet(s) Oral daily  nystatin Powder 1 Application(s) Topical two times a day  pantoprazole    Tablet 40 milliGRAM(s) Oral before breakfast  psyllium Powder 1 Packet(s) Oral daily  senna 2 Tablet(s) Oral at bedtime  sodium chloride 0.9% lock flush 5 milliLiter(s) IV Push two times a day  zinc oxide 40% Paste 1 Application(s) Topical three times a day    MEDICATIONS  (PRN):  albuterol/ipratropium for Nebulization 3 milliLiter(s) Nebulizer every 6 hours PRN Shortness of Breath and/or Wheezing  aluminum hydroxide/magnesium hydroxide/simethicone Suspension 30 milliLiter(s) Oral every 4 hours PRN Dyspepsia  dextrose Oral Gel 15 Gram(s) Oral once PRN Blood Glucose LESS THAN 70 milliGRAM(s)/deciliter  loperamide 2 milliGRAM(s) Oral three times a day PRN Diarrhea  polyethylene glycol 3350 17 Gram(s) Oral daily PRN Constipation  SIMETHICONE SOFTGELS 250 milliGRAM(s) 250 milliGRAM(s) Oral three times a day PRN for gas  sodium chloride 0.65% Nasal 1 Spray(s) Both Nostrils every 8 hours PRN Nasal Congestion

## 2023-10-20 PROCEDURE — 99232 SBSQ HOSP IP/OBS MODERATE 35: CPT

## 2023-10-20 RX ORDER — LACTULOSE 10 G/15ML
20 SOLUTION ORAL ONCE
Refills: 0 | Status: COMPLETED | OUTPATIENT
Start: 2023-10-20 | End: 2023-10-20

## 2023-10-20 RX ADMIN — Medication 1 TABLET(S): at 08:42

## 2023-10-20 RX ADMIN — ZINC OXIDE 1 APPLICATION(S): 200 OINTMENT TOPICAL at 23:00

## 2023-10-20 RX ADMIN — SODIUM CHLORIDE 5 MILLILITER(S): 9 INJECTION INTRAMUSCULAR; INTRAVENOUS; SUBCUTANEOUS at 06:45

## 2023-10-20 RX ADMIN — APIXABAN 5 MILLIGRAM(S): 2.5 TABLET, FILM COATED ORAL at 18:47

## 2023-10-20 RX ADMIN — PANTOPRAZOLE SODIUM 40 MILLIGRAM(S): 20 TABLET, DELAYED RELEASE ORAL at 06:32

## 2023-10-20 RX ADMIN — APIXABAN 5 MILLIGRAM(S): 2.5 TABLET, FILM COATED ORAL at 06:32

## 2023-10-20 RX ADMIN — NYSTATIN CREAM 1 APPLICATION(S): 100000 CREAM TOPICAL at 06:45

## 2023-10-20 RX ADMIN — GABAPENTIN 100 MILLIGRAM(S): 400 CAPSULE ORAL at 22:06

## 2023-10-20 RX ADMIN — Medication 25 MILLIGRAM(S): at 06:37

## 2023-10-20 RX ADMIN — Medication 1 ENEMA: at 15:42

## 2023-10-20 RX ADMIN — NYSTATIN CREAM 1 APPLICATION(S): 100000 CREAM TOPICAL at 18:54

## 2023-10-20 RX ADMIN — ZINC OXIDE 1 APPLICATION(S): 200 OINTMENT TOPICAL at 18:54

## 2023-10-20 RX ADMIN — Medication 25 MILLIGRAM(S): at 18:47

## 2023-10-20 RX ADMIN — ZINC OXIDE 1 APPLICATION(S): 200 OINTMENT TOPICAL at 07:18

## 2023-10-20 RX ADMIN — Medication 1 SUPPOSITORY(S): at 08:44

## 2023-10-20 RX ADMIN — SODIUM CHLORIDE 5 MILLILITER(S): 9 INJECTION INTRAMUSCULAR; INTRAVENOUS; SUBCUTANEOUS at 18:54

## 2023-10-20 RX ADMIN — Medication 6 MILLIGRAM(S): at 22:06

## 2023-10-20 RX ADMIN — ATOVAQUONE 1500 MILLIGRAM(S): 750 SUSPENSION ORAL at 18:47

## 2023-10-20 NOTE — PROGRESS NOTE ADULT - SUBJECTIVE AND OBJECTIVE BOX
CC: Non-specific Interstitial Lung Disease     Today's Subjective & Objective Findings:  Patient seen and examined at bedside this NP and Med Student, Ms Cherie  Partner present at bedside  Reports having episodes of flatulence on moving from lying to sitting, associated with BM  Reports multiple BMs yesterday, semisolid  She asked when she could get immunizations--COVID Flu vaccine considering recent hx of PLEX  We told her that we would clarify this with Pulmonary and probably ID teams    ROS --No dyspnea, head ache, chest or abd pain  Alternative bowel movements   Lab 10/20    Therapy-- engaging, motivated  She will continue therapy on motor strengthening  Will continue to engage for  E stim to upper extremity muscles       Vital Signs Last 24 Hrs  T(C): 36.5 (20 Oct 2023 08:45), Max: 36.8 (19 Oct 2023 22:31)  T(F): 97.7 (20 Oct 2023 08:45), Max: 98.2 (19 Oct 2023 22:31)  HR: 71 (20 Oct 2023 08:45) (71 - 85)  BP: 101/65 (20 Oct 2023 08:45) (101/65 - 115/71)  RR: 16 (20 Oct 2023 08:45) (16 - 16)  SpO2: 96% (20 Oct 2023 08:45) (94% - 96%)  O2 Parameters below as of 20 Oct 2023 08:45  Patient On (Oxygen Delivery Method): nasal cannula  O2 Flow (L/min): 1        PHYSICAL EXAM:  Gen -  Comfortable, sitting on a WC, Oxy sats normal on NC oxy 1L, oxy sats  persistently > 93 %  HEENT - , nostrils are moist, no bleeding, mod icterus  Neck - Supple, No limited ROM, O2 via NC  Pulm - good   Cardiovascular - RRR, S1S2  Chest - good chest expansion,   Abdomen - Soft, non tender, +BS,   Extremities - No Cyanosis, no clubbing, 1+ edema to b/l feet, non pitting,    no calf tenderness, LUE Med line    Neuro-     Cognitive - awake, alert, fully oriented, engaging, speech normal      Motor -                    LEFT    UE - 4/5                    RIGHT UE - 4/5                     LEFT    LE - 2 +-/5 limited by leg edema                    RIGHT LE - 2+-/5  limitted by leg edema,, which is reducing      Sensory - Intact        Reflexes - DTR 1+      Coordination - FTN  impaired  due to weakness   MSK: soreness and weakness  Psychiatric - Mood stable, Affect WNL  Skin: Left lower abdomen ruptured blister 3.0 x 1.0, stage 2 pressure injury to right buttock 4 x1 and left buttock 3x1, stage 2 pressure injury ti right inner thigh 0.2 x 0.2, right groin wound 10.5 x 2.0, Left groin wound 5 x 5,      MMT--Able to contact B/L upper back muscles,   Elbow abduction and adduction, gravity eliminated 2+      LABS:                        9.7    17.56 )-----------( 214      ( 19 Oct 2023 08:40 )             31.1     10-19    135  |  100  |  20  ----------------------------<  128<H>  3.6   |  30  |  0.40<L>    Ca    8.4      19 Oct 2023 08:40    TPro  4.8<L>  /  Alb  2.1<L>  /  TBili  4.2<H>  /  DBili  x   /  AST  49<H>  /  ALT  132<H>  /  AlkPhos  305<H>  10-19      Urinalysis Basic - ( 19 Oct 2023 08:40 )    Color: x / Appearance: x / SG: x / pH: x  Gluc: 128 mg/dL / Ketone: x  / Bili: x / Urobili: x   Blood: x / Protein: x / Nitrite: x   Leuk Esterase: x / RBC: x / WBC x   Sq Epi: x / Non Sq Epi: x / Bacteria: x      MEDICATIONS  (STANDING):  apixaban 5 milliGRAM(s) Oral every 12 hours  artificial  tears Solution 1 Drop(s) Both EYES every 12 hours  ascorbic acid 500 milliGRAM(s) Oral daily  atovaquone  Suspension 1500 milliGRAM(s) Oral daily  dextrose 5%. 1000 milliLiter(s) (50 mL/Hr) IV Continuous <Continuous>  dextrose 5%. 1000 milliLiter(s) (100 mL/Hr) IV Continuous <Continuous>  dextrose 50% Injectable 12.5 Gram(s) IV Push once  dextrose 50% Injectable 25 Gram(s) IV Push once  dextrose 50% Injectable 25 Gram(s) IV Push once  folic acid 1 milliGRAM(s) Oral daily  gabapentin 100 milliGRAM(s) Oral at bedtime  glucagon  Injectable 1 milliGRAM(s) IntraMuscular once  hydrocortisone hemorrhoidal Suppository 1 Suppository(s) Rectal two times a day  influenza   Vaccine 0.5 milliLiter(s) IntraMuscular once  lactobacillus acidophilus 1 Tablet(s) Oral two times a day with meals  lactulose Syrup 20 Gram(s) Oral once  lactulose Syrup 20 Gram(s) Oral once  melatonin 6 milliGRAM(s) Oral at bedtime  metoprolol tartrate 25 milliGRAM(s) Oral two times a day  multivitamin 1 Tablet(s) Oral daily  nystatin Powder 1 Application(s) Topical two times a day  pantoprazole    Tablet 40 milliGRAM(s) Oral before breakfast  psyllium Powder 1 Packet(s) Oral daily  saline laxative (FLEET) Rectal Enema 1 Enema Rectal once  senna 2 Tablet(s) Oral at bedtime  sodium chloride 0.9% lock flush 5 milliLiter(s) IV Push two times a day  zinc oxide 40% Paste 1 Application(s) Topical three times a day    MEDICATIONS  (PRN):  albuterol/ipratropium for Nebulization 3 milliLiter(s) Nebulizer every 6 hours PRN Shortness of Breath and/or Wheezing  aluminum hydroxide/magnesium hydroxide/simethicone Suspension 30 milliLiter(s) Oral every 4 hours PRN Dyspepsia  dextrose Oral Gel 15 Gram(s) Oral once PRN Blood Glucose LESS THAN 70 milliGRAM(s)/deciliter  loperamide 2 milliGRAM(s) Oral three times a day PRN Diarrhea  polyethylene glycol 3350 17 Gram(s) Oral daily PRN Constipation  SIMETHICONE SOFTGELS 250 milliGRAM(s) 250 milliGRAM(s) Oral three times a day PRN for gas  sodium chloride 0.65% Nasal 1 Spray(s) Both Nostrils every 8 hours PRN Nasal Congestion

## 2023-10-20 NOTE — PROGRESS NOTE ADULT - ASSESSMENT
Assessment/Plan:  ALIZA FOLEY is a 62 year old female with PMH of pAFIB and sciatica; who presented to [facility] ED with SOB, and was found to have groundglass opacities and interstitial lung disease. She was treated with empiric Vancomycin and Zosyn. She underwent a bronchoscopy with bronchoalveolar lavage and pulse steroids. She was given IV diuretics for increased pulmonary congestion, but her respiratory status continued to worsen.  On 9/13, she was transferred to [facility] for ECMO requirements. She was further treated with Plasmapheresis, Rituximab and high-dose steroids, with significant improvement. Her overall hospital course was complicated by thrombocytopenia (s/p several platelet transfusions), leukocytosis (infectious workup entirely negative- s/p Vanco and Ceftriaxone), AFIB with RVR (resolved with Metoprolol), dysphagia (requiring NGT), transaminitis (secondary to acute illness and hypotension),  non-occlusive RIJ DVT (started on Lovenox). Patient now admitted for a multidisciplinary rehab program. 10-05-23 @ 13:18    * Await AB XR mod stool burden on transverse colon, will give enema and lactulose today  * On Methylprednisolone taper   * Rehab team to explore all potential DC options ([facility], extended stay/self pay?, etc)   * Wound care following  * Pulmonary team--f/u with patient and clarify when she can get immunizations      #Interstitial Lung Disease and Mixed connective tissue disease  - Gait Instability, ADL impairments and Functional impairments: start Comprehensive Rehab Program of PT/OT/SLP - 3 hours a day, 5 days a week  - P&O as needed   - Non-specific Interstitial Lung Disease  - Requiring ECMO   - Improvement s/p Plasmapheresis, Rituximab and high- dose steroids   - Albuterol/Ipratropium Nebulizer every 6 hours  - Mepron 1500mg daily  - PO Medrol ( due to liver dysfunction): Plan will be to taper by ~20% every 2 weeks  Medrol   64mg x 2 weeks  56mg x 2 weeks  44mg x 2 weeks   36mg x 2 weeks - Follow up Outpatient   -- Repeat CT Chest in 6 weeks   --Pulmonary team--f/u with patient and clarify when she can get immunizations        10/18:  Meeting at bed side  We had a brief meeting at bed side at this time  We discussed details from IDT including projected dc date of 10/27, (CMG 10/21), but the extended time was to make up for time spent addressing medical issues on acute rehab admission, during which he got limited therapy  They appreciated this  But they has some requests  1--To explore extension of acute rehab treatment by at 2 wk period from 10/27, with the intention of home dc afterwards  2--They are willing to personally pay, depending on the cost if insurance will not cover this  3--If both requests not possible, they will agree to EMIGDIO provided patient's partner will be allowed to care for patient in same room in the EMIGDIO facility (they report being told that in [facility], this arrangement will not be possible) if that is true, they would pre [facility] or [facility]  She agrees to continue therapy while SW team explores these options    SUBSEQUENT MEETING WITH  NURSE MANAGER AND SUPERVISION, NP and SW  We discussed patient's/family's requests  Plan is to explore these requests and update patient on possibilities of each    #pAFIB/Rt Ij thrombus  - Metoprolol 25mg BID and lovenox  -- Cardiology consult--recm can switch to oral AC  --Await discussion with patient's cardiologist before switch to oral AC as requested by patient   (recent GI bleed, from hemorrhoid, resolved)    # Chronic Tinnitus   - ENT consult for hx of tinnitus and feeling of ear fullness  - ENT review and recs appreciate, Patient already made ENT appointment for f/u    # Lymphedema both legs -chronic and Rt IJ thrombus  - ACE Wrap BL LEs  - BL LE doppler negative for DVT  - BL UE doppler with chronic thrombotic changes in RIJ   - Surg consult recs--commence anticoagulation as recs by cardiology  - Eliquis 5mg BID - tolerating well     #Transaminitis --LFT Down trending   - Secondary to acute illness and hypotension at [facility]  - Outpatient follow up     #Neuropathy  --continue gabapentin 10/16 and E stim, tolerating gabapentin, willing to continue trial of E stim for now  --Work on motor strengthening, priority for UE as preferred by patient     #Sleep  - Melatonin 6mg at HS    #Skin  - Skin: Left lower abdomen ruptured blister 3.0 x 1.0, stage 2 pressure injury to right buttock 4 x1 and left buttock 3x1, stage 2 pressure injury ti right inner thigh 0.2 x 0.2, right groin wound 10.5 x 2.0, Left groin wound 5 x 5,  right neck scab wound  -- MAD to skin folds- Nystatin to submammary and abdominal pannus BID  -- MAD to L groin- cleanse with NS, Triad cream daily  -- Mad to R groin- Nystatin powder daily   -- R groin wound-- aquacel packing, Triad to periwound, Allevyn foam- daily and PRN   - Pressure injury/Skin: OOB to Chair, PT/OT   - Offload pressure, low air loss support surfaces, T&P every 2 hours   --Wound care consult-10/9 -Multiple wounds--perineal and buttock/sacral, recs appreciated   --Suggest Continue treatments as below:   Twice daily & PRN Normal Saline Cleanse of abdominal pannus & breast folds, perineal, Left & Right inner buttock erosions.  Pat thoroughly dry.   Apply Desitin generously twice daily (& PRN) to perineal, buttocks, and left groin erosions, leave open to air.    Apply Nystatin powder to abdominal pannus and breast folds.  Suggest use of Interdry cloths in folds to 'wick' moisture.  Suggest daily Normal Saline Cleanse of right groin surgical wound, pat dry.  Apply Cavillon film barrier to intact periwound skin.  Place Aquacell strip over open area, cover with foam dressing.  - Wound care following     -Perform BL elbow flexion & elbow extension with antigravity in therapy    #Pain Mgmt   - Tylenol PRN   - Gabapentin 100mg at HS     #GI/Bowel Mgmt   - Pantoprazole  - Senna  --on hold due to recent Loose BM  - PRN: Maalox   - Stool count  - Start Lactobacillus BID   --Await AB XR mod stool burden on transverse colon, will give enema and lactulose today  Await official read    # Alternating bowel function --commenced probiotic, latobacilus   * Leucocytosis probably steroid related and partly due to her Mixed Connective Tissue Disease, will continue monitoring     #/Bladder Mgmt --voiding     #FEN   #Dysphagia  - Diet - Minced & Moist  [CC]    - Dysphaiga- SLP evaluation     #Health maintenance  - Folic Acid   - MVI  - Ocean nasal spray    # Difficulty with access to peripheral veins-- Blood draws from Left arm Medline     #Precautions / PROPHYLAXIS:   - Falls  - ortho: Weight bearing status: WBAT   - Lungs: Aspiration, Incentive Spirometer   - DVT PPX: Eliquis 5 mg BID   ---------------------------    * 10/19 Labs unremarkable,,stable anemia, LFT elevated, but downtrending, leucocytosis, non progressive    Liaison with family  Patient's partner present during review, details of review d/w her, detailed explanation of therapy protocol explained and she was happy with same  10/9--Partner present during review and discussed details treatment plan with her    Liaison with providers  Consult recs form multiple specialities being implemented  Surgical consult and recs 10/9--agreed with plan for AC for Rt IJ chronic thrombus    10/10--No response to multiple calls to patient's private cardiologist Dr Otto Stratton  ph       IDT conference on 10/10  TDD: 10/27 to home vs EMIGDIO.  Barriers: Obesity, poor skin integrity, poor endurance, BL UE weakness, poor coordination, pain, incontinent x2.  Social Work: Lives with spouse in [place] 6 months of year. DC to apt in [place] with elevator, no steps. Supportive spouse.   DME: Has 02 compressor.   OT: Max A for eating/grooming. Total for all other ADLs and transfers. Goal of Mod A.   PT: Damaris 2-3pr A. Sitting EOB is max.   SLP: SBS with TLs. Mild cog. Severe aphonia.  RT: --n/a   Functional level on DC: Min A for transfers.  ---------------------------  OUTPATIENT/FOLLOW UP:    Trae Whitney  Pulmonary Disease  [address], [address] [place] [address]  Phone: [phone]  Fax: [phone]  Follow Up Time: 2 weeks    Ryanne Allen  Rheumatology  [address] [address]  Phone: [phone]  Fax: [phone]  Follow Up Time: 1 week    Kyle Pierce  Internal Medicine  [address] [place] [address], [address]  [place] [address]  Phone: [phone]  Fax: [phone]  Follow Up Time: 2 weeks    Addy Powers  Pulmonary Disease  [address] [facility], [address]  [place], [place] [id number]  Phone: [phone]  Fax: [phone]  Follow Up Time:     wKame Hawley  Critical Care Medicine  [address] [place] [address], [address]  [place] [address]  Phone: [phone]  Fax: [phone]  Follow Up Time:     Neena Borrego  Rheumatology  [address] [place] [address], [address]  [place] [address]  Phone: [phone]  Fax: [phone]  Follow Up Time:    [facility]  [facility] [address]  Scheduled Appointment: 09/13/2023  2:40 PM  ---------------------------

## 2023-10-21 PROCEDURE — 99232 SBSQ HOSP IP/OBS MODERATE 35: CPT

## 2023-10-21 RX ADMIN — APIXABAN 5 MILLIGRAM(S): 2.5 TABLET, FILM COATED ORAL at 07:00

## 2023-10-21 RX ADMIN — Medication 1 MILLIGRAM(S): at 12:16

## 2023-10-21 RX ADMIN — NYSTATIN CREAM 1 APPLICATION(S): 100000 CREAM TOPICAL at 18:26

## 2023-10-21 RX ADMIN — Medication 6 MILLIGRAM(S): at 21:12

## 2023-10-21 RX ADMIN — PANTOPRAZOLE SODIUM 40 MILLIGRAM(S): 20 TABLET, DELAYED RELEASE ORAL at 07:00

## 2023-10-21 RX ADMIN — Medication 25 MILLIGRAM(S): at 07:00

## 2023-10-21 RX ADMIN — ZINC OXIDE 1 APPLICATION(S): 200 OINTMENT TOPICAL at 14:10

## 2023-10-21 RX ADMIN — GABAPENTIN 100 MILLIGRAM(S): 400 CAPSULE ORAL at 21:11

## 2023-10-21 RX ADMIN — Medication 500 MILLIGRAM(S): at 12:15

## 2023-10-21 RX ADMIN — Medication 1 TABLET(S): at 18:20

## 2023-10-21 RX ADMIN — APIXABAN 5 MILLIGRAM(S): 2.5 TABLET, FILM COATED ORAL at 18:22

## 2023-10-21 RX ADMIN — SODIUM CHLORIDE 5 MILLILITER(S): 9 INJECTION INTRAMUSCULAR; INTRAVENOUS; SUBCUTANEOUS at 07:05

## 2023-10-21 RX ADMIN — Medication 1 TABLET(S): at 08:10

## 2023-10-21 RX ADMIN — NYSTATIN CREAM 1 APPLICATION(S): 100000 CREAM TOPICAL at 07:06

## 2023-10-21 RX ADMIN — ZINC OXIDE 1 APPLICATION(S): 200 OINTMENT TOPICAL at 21:12

## 2023-10-21 RX ADMIN — Medication 25 MILLIGRAM(S): at 18:20

## 2023-10-21 RX ADMIN — SODIUM CHLORIDE 5 MILLILITER(S): 9 INJECTION INTRAMUSCULAR; INTRAVENOUS; SUBCUTANEOUS at 18:30

## 2023-10-21 RX ADMIN — ATOVAQUONE 1500 MILLIGRAM(S): 750 SUSPENSION ORAL at 12:16

## 2023-10-21 RX ADMIN — ZINC OXIDE 1 APPLICATION(S): 200 OINTMENT TOPICAL at 07:06

## 2023-10-21 NOTE — PROGRESS NOTE ADULT - SUBJECTIVE AND OBJECTIVE BOX
Patient is a 62y old  Female who presents with a chief complaint of Interstitial Lung Disease (21 Oct 2023 15:04)      Subjective and overnight events:  Patient seen and examined at bedside. no complaints. no fever, chills, sob, cp, abd pain. + BM with an enema     ALLERGIES:  No Known Allergies    MEDICATIONS  (STANDING):  apixaban 5 milliGRAM(s) Oral every 12 hours  artificial  tears Solution 1 Drop(s) Both EYES every 12 hours  ascorbic acid 500 milliGRAM(s) Oral daily  atovaquone  Suspension 1500 milliGRAM(s) Oral daily  dextrose 5%. 1000 milliLiter(s) (50 mL/Hr) IV Continuous <Continuous>  dextrose 5%. 1000 milliLiter(s) (100 mL/Hr) IV Continuous <Continuous>  dextrose 50% Injectable 12.5 Gram(s) IV Push once  dextrose 50% Injectable 25 Gram(s) IV Push once  dextrose 50% Injectable 25 Gram(s) IV Push once  folic acid 1 milliGRAM(s) Oral daily  gabapentin 100 milliGRAM(s) Oral at bedtime  glucagon  Injectable 1 milliGRAM(s) IntraMuscular once  hydrocortisone hemorrhoidal Suppository 1 Suppository(s) Rectal two times a day  influenza   Vaccine 0.5 milliLiter(s) IntraMuscular once  lactobacillus acidophilus 1 Tablet(s) Oral two times a day with meals  melatonin 6 milliGRAM(s) Oral at bedtime  metoprolol tartrate 25 milliGRAM(s) Oral two times a day  multivitamin 1 Tablet(s) Oral daily  nystatin Powder 1 Application(s) Topical two times a day  pantoprazole    Tablet 40 milliGRAM(s) Oral before breakfast  psyllium Powder 1 Packet(s) Oral daily  senna 2 Tablet(s) Oral at bedtime  sodium chloride 0.9% lock flush 5 milliLiter(s) IV Push two times a day  zinc oxide 40% Paste 1 Application(s) Topical three times a day    MEDICATIONS  (PRN):  albuterol/ipratropium for Nebulization 3 milliLiter(s) Nebulizer every 6 hours PRN Shortness of Breath and/or Wheezing  aluminum hydroxide/magnesium hydroxide/simethicone Suspension 30 milliLiter(s) Oral every 4 hours PRN Dyspepsia  dextrose Oral Gel 15 Gram(s) Oral once PRN Blood Glucose LESS THAN 70 milliGRAM(s)/deciliter  loperamide 2 milliGRAM(s) Oral three times a day PRN Diarrhea  polyethylene glycol 3350 17 Gram(s) Oral daily PRN Constipation  SIMETHICONE SOFTGELS 250 milliGRAM(s) 250 milliGRAM(s) Oral three times a day PRN for gas  sodium chloride 0.65% Nasal 1 Spray(s) Both Nostrils every 8 hours PRN Nasal Congestion    Vital Signs Last 24 Hrs  T(F): 98.1 (21 Oct 2023 09:01), Max: 98.1 (21 Oct 2023 09:01)  HR: 68 (21 Oct 2023 09:01) (68 - 72)  BP: 102/62 (21 Oct 2023 09:01) (102/62 - 110/73)  RR: 16 (21 Oct 2023 09:01) (16 - 17)  SpO2: 97% (21 Oct 2023 09:01) (96% - 98%)  I&O's Summary    PHYSICAL EXAM:  General: NAD, A/O x 3  ENT: MMM  Neck: Supple, No JVD  Lungs: Clear to auscultation bilaterally  Cardio: RRR, S1/S2, No murmurs  Abdomen: Soft, Nontender, Nondistended; Bowel sounds present  Extremities: No calf tenderness, No pitting edema    LABS:                        9.7    17.56 )-----------( 214      ( 19 Oct 2023 08:40 )             31.1     10-19    135  |  100  |  20  ----------------------------<  128  3.6   |  30  |  0.40    Ca    8.4      19 Oct 2023 08:40    TPro  4.8  /  Alb  2.1  /  TBili  4.2  /  DBili  x   /  AST  49  /  ALT  132  /  AlkPhos  305  10-19      09-25 Chol -- LDL -- HDL -- Trig 199 mg/dL        Urinalysis Basic - ( 19 Oct 2023 08:40 )    Color: x / Appearance: x / SG: x / pH: x  Gluc: 128 mg/dL / Ketone: x  / Bili: x / Urobili: x   Blood: x / Protein: x / Nitrite: x   Leuk Esterase: x / RBC: x / WBC x   Sq Epi: x / Non Sq Epi: x / Bacteria: x        COVID-19 PCR: NotDetec (10-05-23 @ 20:17)      RADIOLOGY & ADDITIONAL TESTS:    Care Discussed with Consultants/Other Providers:

## 2023-10-21 NOTE — PROGRESS NOTE ADULT - ASSESSMENT
Physical Examination:  GENERAL:                Alert, Oriented, No acute distress.    HEENT:                    Pupils equal, reactive to light.  Symmetric. No JVD, Moist MM  PULM:                     Bilateral air entry,  poor air entry at bases No Rales, No Rhonchi, No Wheezing  CVS:                         S1, S2,  No Murmur  ABD:                        Soft, nondistended, nontender, normoactive bowel sounds,   EXT:                        Trace non pitting edema, nontender, No Cyanosis or Clubbing   Vascular:                 Warm Extremities, Normal Capillary refill, Normal Distal Pulses  NEURO:                  Alert, oriented, interactive, bilateral upper and lower extremity weakness   PSYC:                      Calm, + Insight.      Assessment  62F PMH A-Fib with recently diagnosed MCTD-ILD (+ARIANA 1:1280, RNP>8, Sm>8) who was admitted to [facility] with acute hypoxemic respiratory failure that initially responded to steroids, then with worsening after taper with development of acute hypoxemic and hypercapnic respiratory failure requiring intubation and VV- ECMO on 9/13, then transferred to [facility], concerning for DAH vs ILD flare, decannulated from VV-ECMO 9/21, extubated 9/22. S/p pulse steroids, PLEX (9/15, 9/18, 9/20) and Rituximab (9/16 & 10/3). Course c/b severe leukopenia s/p filgastrim, shock liver with slow to resolved hyperbilirubinemia, RIJ DVT, oropharyngeal dysphagia, and recurrent SVT. Repeat CT chest on 9/30 with markedly improved bilateral groundglass opacities with resolved lung consolidations. Now in acute rehab on 4 lpm NC oxygen and tapering dose of medrol.     Problem List:  1. Interstitial lung disease   2. Mixed connective tissue disease   3. Acute hypoxia  4. RIJ DVT     Plan:  - Stable respiratory status, on NC@ 1 LPM at rest, can increase LPM with activity with goal SpO2>92%  - Cont. Methylprednisolone PO, taper as per rheumatology recs. from [facility]   - Continue atovaquone for PCP prophylaxis  - S/p Rituximab 9/16 and 10/3  - S/p PLEX during MICU stay  - Repeat CT scan in 6 weeks ~ November 11  - No evidence of active infection, monitor off antibiotics  - Cont. anticoagulation for DVT associated with ECMO cannula  - Consider incentive spirometry   - Care per primary team  - Discussed with spouse at bedside   - PT OOB as tolerated  - Outpatient pulmonary and rheumatology follow up upon discharge  - I am personally not aware if need new set of vaccines when it comes to post PLEX therapy can consider to ask immunology or rheumatology. of note being on rituximab decreases ability to develop ab. will check covid Ab neuclecaspid and spike domain to see her immunological immunity to COVID. decision for vaccination should be in conjunction to primary rheumatology.

## 2023-10-21 NOTE — PROGRESS NOTE ADULT - SUBJECTIVE AND OBJECTIVE BOX
Cc: Gait dysfunction    HPI: Patient with no new medical issues today.  Pain controlled, no chest pain, no N/V, no Fevers/Chills. No other new ROS  Has been tolerating rehabilitation program.    MEDICATIONS  (STANDING):  apixaban 5 milliGRAM(s) Oral every 12 hours  artificial  tears Solution 1 Drop(s) Both EYES every 12 hours  ascorbic acid 500 milliGRAM(s) Oral daily  atovaquone  Suspension 1500 milliGRAM(s) Oral daily  dextrose 5%. 1000 milliLiter(s) (50 mL/Hr) IV Continuous <Continuous>  dextrose 5%. 1000 milliLiter(s) (100 mL/Hr) IV Continuous <Continuous>  dextrose 50% Injectable 12.5 Gram(s) IV Push once  dextrose 50% Injectable 25 Gram(s) IV Push once  dextrose 50% Injectable 25 Gram(s) IV Push once  folic acid 1 milliGRAM(s) Oral daily  gabapentin 100 milliGRAM(s) Oral at bedtime  glucagon  Injectable 1 milliGRAM(s) IntraMuscular once  hydrocortisone hemorrhoidal Suppository 1 Suppository(s) Rectal two times a day  influenza   Vaccine 0.5 milliLiter(s) IntraMuscular once  lactobacillus acidophilus 1 Tablet(s) Oral two times a day with meals  melatonin 6 milliGRAM(s) Oral at bedtime  metoprolol tartrate 25 milliGRAM(s) Oral two times a day  multivitamin 1 Tablet(s) Oral daily  nystatin Powder 1 Application(s) Topical two times a day  pantoprazole    Tablet 40 milliGRAM(s) Oral before breakfast  psyllium Powder 1 Packet(s) Oral daily  senna 2 Tablet(s) Oral at bedtime  sodium chloride 0.9% lock flush 5 milliLiter(s) IV Push two times a day  zinc oxide 40% Paste 1 Application(s) Topical three times a day    MEDICATIONS  (PRN):  albuterol/ipratropium for Nebulization 3 milliLiter(s) Nebulizer every 6 hours PRN Shortness of Breath and/or Wheezing  aluminum hydroxide/magnesium hydroxide/simethicone Suspension 30 milliLiter(s) Oral every 4 hours PRN Dyspepsia  dextrose Oral Gel 15 Gram(s) Oral once PRN Blood Glucose LESS THAN 70 milliGRAM(s)/deciliter  loperamide 2 milliGRAM(s) Oral three times a day PRN Diarrhea  polyethylene glycol 3350 17 Gram(s) Oral daily PRN Constipation  SIMETHICONE SOFTGELS 250 milliGRAM(s) 250 milliGRAM(s) Oral three times a day PRN for gas  sodium chloride 0.65% Nasal 1 Spray(s) Both Nostrils every 8 hours PRN Nasal Congestion      Vital Signs Last 24 Hrs  T(C): 36.7 (21 Oct 2023 09:01), Max: 36.7 (21 Oct 2023 09:01)  T(F): 98.1 (21 Oct 2023 09:01), Max: 98.1 (21 Oct 2023 09:01)  HR: 68 (21 Oct 2023 09:01) (68 - 72)  BP: 102/62 (21 Oct 2023 09:01) (102/62 - 110/73)  BP(mean): --  RR: 16 (21 Oct 2023 09:01) (16 - 17)  SpO2: 97% (21 Oct 2023 09:01) (96% - 98%)    Parameters below as of 21 Oct 2023 09:01  Patient On (Oxygen Delivery Method): nasal cannula  O2 Flow (L/min): 1      In NAD  HEENT- EOMI  Heart- RRR, S1S2  Lungs- CTA bl.  Abd- + BS, NT  Ext- No calf pain  Neuro- Exam unchanged            CAPILLARY BLOOD GLUCOSE                    Imp: Patient with diagnosis of          admitted for comprehensive acute rehabilitation.    Plan:  - Continue therapies  - DVT prophylaxis  - Skin- Turn q2h, check skin daily  - Continue current medications; patient medically stable.   - Patient is stable to continue current rehabilitation program.    Cc: Gait dysfunction    HPI: Patient with no new medical issues today.  She reports 2 BMs yesterday, normal texture  She is awaiting f/u Pulmonary review and discussion regarding vaccine admin post PLEX treatment   She asked if use of compression gloves on hands would help edema treatment and stimulate motor function. we agreed to this and d/w OT to facilitate this in addition to use of hand/finger compression balls    ROS  Pain controlled, no chest pain, no N/V, no Fevers/Chills. lbm 10/20    Has been tolerating rehabilitation program.    MEDICATIONS  (STANDING):  apixaban 5 milliGRAM(s) Oral every 12 hours  artificial  tears Solution 1 Drop(s) Both EYES every 12 hours  ascorbic acid 500 milliGRAM(s) Oral daily  atovaquone  Suspension 1500 milliGRAM(s) Oral daily  dextrose 5%. 1000 milliLiter(s) (50 mL/Hr) IV Continuous <Continuous>  dextrose 5%. 1000 milliLiter(s) (100 mL/Hr) IV Continuous <Continuous>  dextrose 50% Injectable 12.5 Gram(s) IV Push once  dextrose 50% Injectable 25 Gram(s) IV Push once  dextrose 50% Injectable 25 Gram(s) IV Push once  folic acid 1 milliGRAM(s) Oral daily  gabapentin 100 milliGRAM(s) Oral at bedtime  glucagon  Injectable 1 milliGRAM(s) IntraMuscular once  hydrocortisone hemorrhoidal Suppository 1 Suppository(s) Rectal two times a day  influenza   Vaccine 0.5 milliLiter(s) IntraMuscular once  lactobacillus acidophilus 1 Tablet(s) Oral two times a day with meals  melatonin 6 milliGRAM(s) Oral at bedtime  metoprolol tartrate 25 milliGRAM(s) Oral two times a day  multivitamin 1 Tablet(s) Oral daily  nystatin Powder 1 Application(s) Topical two times a day  pantoprazole    Tablet 40 milliGRAM(s) Oral before breakfast  psyllium Powder 1 Packet(s) Oral daily  senna 2 Tablet(s) Oral at bedtime  sodium chloride 0.9% lock flush 5 milliLiter(s) IV Push two times a day  zinc oxide 40% Paste 1 Application(s) Topical three times a day    MEDICATIONS  (PRN):  albuterol/ipratropium for Nebulization 3 milliLiter(s) Nebulizer every 6 hours PRN Shortness of Breath and/or Wheezing  aluminum hydroxide/magnesium hydroxide/simethicone Suspension 30 milliLiter(s) Oral every 4 hours PRN Dyspepsia  dextrose Oral Gel 15 Gram(s) Oral once PRN Blood Glucose LESS THAN 70 milliGRAM(s)/deciliter  loperamide 2 milliGRAM(s) Oral three times a day PRN Diarrhea  polyethylene glycol 3350 17 Gram(s) Oral daily PRN Constipation  SIMETHICONE SOFTGELS 250 milliGRAM(s) 250 milliGRAM(s) Oral three times a day PRN for gas  sodium chloride 0.65% Nasal 1 Spray(s) Both Nostrils every 8 hours PRN Nasal Congestion      Vital Signs Last 24 Hrs  T(C): 36.7 (21 Oct 2023 09:01), Max: 36.7 (21 Oct 2023 09:01)  T(F): 98.1 (21 Oct 2023 09:01), Max: 98.1 (21 Oct 2023 09:01)  HR: 68 (21 Oct 2023 09:01) (68 - 72)  BP: 102/62 (21 Oct 2023 09:01) (102/62 - 110/73)  RR: 16 (21 Oct 2023 09:01) (16 - 17)  SpO2: 97% (21 Oct 2023 09:01) (96% - 98%)  Parameters below as of 21 Oct 2023 09:01  Patient On (Oxygen Delivery Method): nasal cannula  O2 Flow (L/min): 1    EXAM  In NAD  HEENT- EOMI  Heart- RRR, S1S2  Lungs- CTA bl.  Abd- + BS, NT  Ext- No calf non tender at rest  Rt groin and sacral wounds    Neuro- Exam AAO X 3  Decreased motor strength globally    CAPILLARY BLOOD GLUCOSE    Imp: Patient with diagnosis of  Interstitial Lung Disease admitted for comprehensive acute rehabilitation.    Plan:  - Continue therapies  - DVT prophylaxis/Rt IJ thrombus--Apixiban  - Skin- Rt groin and sacral skin injuries--continue wound care  --Gastritis--c/w pantoprazole  --Insomina--c/w melatonin   - Continue current medications;  - Patient is stable to continue current rehabilitation program.

## 2023-10-21 NOTE — PROGRESS NOTE ADULT - SUBJECTIVE AND OBJECTIVE BOX
Follow-up Pulmonary Progress Note  Chief Complaint : Interstitial lung disease      patient seen and examined  comfortable  was requested to f/u for question regarding vaccine requirments  states after [place] she feels that all her antibodies have been replaced and need to get new slew of vaccines.        Allergies :No Known Allergies      PAST MEDICAL & SURGICAL HISTORY:  Afib    Sciatica        Medications:  MEDICATIONS  (STANDING):  apixaban 5 milliGRAM(s) Oral every 12 hours  artificial  tears Solution 1 Drop(s) Both EYES every 12 hours  ascorbic acid 500 milliGRAM(s) Oral daily  atovaquone  Suspension 1500 milliGRAM(s) Oral daily  dextrose 5%. 1000 milliLiter(s) (50 mL/Hr) IV Continuous <Continuous>  dextrose 5%. 1000 milliLiter(s) (100 mL/Hr) IV Continuous <Continuous>  dextrose 50% Injectable 12.5 Gram(s) IV Push once  dextrose 50% Injectable 25 Gram(s) IV Push once  dextrose 50% Injectable 25 Gram(s) IV Push once  folic acid 1 milliGRAM(s) Oral daily  gabapentin 100 milliGRAM(s) Oral at bedtime  glucagon  Injectable 1 milliGRAM(s) IntraMuscular once  hydrocortisone hemorrhoidal Suppository 1 Suppository(s) Rectal two times a day  influenza   Vaccine 0.5 milliLiter(s) IntraMuscular once  lactobacillus acidophilus 1 Tablet(s) Oral two times a day with meals  melatonin 6 milliGRAM(s) Oral at bedtime  metoprolol tartrate 25 milliGRAM(s) Oral two times a day  multivitamin 1 Tablet(s) Oral daily  nystatin Powder 1 Application(s) Topical two times a day  pantoprazole    Tablet 40 milliGRAM(s) Oral before breakfast  psyllium Powder 1 Packet(s) Oral daily  senna 2 Tablet(s) Oral at bedtime  sodium chloride 0.9% lock flush 5 milliLiter(s) IV Push two times a day  zinc oxide 40% Paste 1 Application(s) Topical three times a day    MEDICATIONS  (PRN):  albuterol/ipratropium for Nebulization 3 milliLiter(s) Nebulizer every 6 hours PRN Shortness of Breath and/or Wheezing  aluminum hydroxide/magnesium hydroxide/simethicone Suspension 30 milliLiter(s) Oral every 4 hours PRN Dyspepsia  dextrose Oral Gel 15 Gram(s) Oral once PRN Blood Glucose LESS THAN 70 milliGRAM(s)/deciliter  loperamide 2 milliGRAM(s) Oral three times a day PRN Diarrhea  polyethylene glycol 3350 17 Gram(s) Oral daily PRN Constipation  SIMETHICONE SOFTGELS 250 milliGRAM(s) 250 milliGRAM(s) Oral three times a day PRN for gas  sodium chloride 0.65% Nasal 1 Spray(s) Both Nostrils every 8 hours PRN Nasal Congestion      Antibiotics History  atovaquone  Suspension 1500 milliGRAM(s) Oral daily, 10-06-23 @ 00:00      Heme Medications   apixaban 5 milliGRAM(s) Oral every 12 hours, 10-11-23 @ 09:36      GI Medications  aluminum hydroxide/magnesium hydroxide/simethicone Suspension 30 milliLiter(s) Oral every 4 hours, 10-05-23 @ 18:55, Routine PRN  loperamide 2 milliGRAM(s) Oral three times a day, 10-12-23 @ 08:44, Routine PRN  pantoprazole    Tablet 40 milliGRAM(s) Oral before breakfast, 10-06-23 @ 00:00, Routine  polyethylene glycol 3350 17 Gram(s) Oral daily, 10-16-23 @ 07:02, Routine PRN  psyllium Powder 1 Packet(s) Oral daily, 10-12-23 @ 08:45, Routine  senna 2 Tablet(s) Oral at bedtime, 10-15-23 @ 11:54, Routine         VITALS:  T(C): 36.7 (10-21-23 @ 09:01), Max: 36.7 (10-21-23 @ 09:01)  T(F): 98.1 (10-21-23 @ 09:01), Max: 98.1 (10-21-23 @ 09:01)  HR: 68 (10-21-23 @ 09:01) (68 - 72)  BP: 102/62 (10-21-23 @ 09:01) (102/62 - 110/73)  BP(mean): --  ABP: --  ABP(mean): --  RR: 16 (10-21-23 @ 09:01) (16 - 17)  SpO2: 97% (10-21-23 @ 09:01) (96% - 98%)  CVP(mm Hg): --  CVP(cm H2O): --    Ins and Outs

## 2023-10-21 NOTE — PROGRESS NOTE ADULT - ASSESSMENT
62 year old female with PMH of pAFIB and sciatica; who presented to [facility] ED with SOB, and was found to have groundglass opacities and interstitial lung disease. She was treated with empiric Vancomycin and Zosyn. She underwent a bronchoscopy with bronchoalveolar lavage and pulse steroids. She was given IV diuretics for increased pulmonary congestion, but her respiratory status continued to worsen.  On 9/13, she was transferred to [facility] for ECMO requirements. She was further treated with Plasmapheresis, Rituximab and high-dose steroids, with significant improvement. Her overall hospital course was complicated by thrombocytopenia (s/p several platelet transfusions), leukocytosis (infectious workup entirely negative- s/p Vanco and Ceftriaxone), AFIB with RVR (resolved with Metoprolol), dysphagia (requiring NGT), transaminitis (secondary to acute illness and hypotension),  non-occlusive RIJ DVT (started on Lovenox). Patient now admitted for a multidisciplinary rehab program- pt/ot/dvt ppx    Gait Instability/Functional Impairment  Interstitial Lung Disease, s/p ECMO   Mixed Connective Tissue Disease   Acute Hypoxia   -s/p Plasmapheresis, Rituximab and high- dose steroids   -Albuterol/Ipratropium Nebulizer every 6 hours PRN  -Continue PO Medrol taper- Plan will be to taper by ~20% every 2 weeks  -Continue PPx Mepron   -Repeat CT Chest in 6 weeks (~11/11)  -O2 supplementation for O2Sat >92%  -Cardiology consult appreciated   -Pulmonary following     Leukocytosis   -May be related to steroids, she has been afebrile and hemodynamically stable  -Monitor CBC    Diarrhea  -imodium prn. no recent antibiotic use     Anemia  -Likely multifactorial  -H/H stable  -Monitor CBC     pAFIB  -Continue Metoprolol   -Continue Eliquis     RIJ DVT found at ECMO cannula   -RUE duplex 10/6 showed No evidence of right upper extremity deep venous thrombosis. 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  -Continue Eliquis   -Vascular consult appreciated     Transaminitis, jaundice  -Secondary to acute illness and hypotension at [facility]  -Trend LFTs, bili-  downtrending  -Avoid hepatoxic agents   -Outpatient follow up     Tinnitus/Epistaxis   -Will need outpatient ENT follow up for tinnitus  -No further epistaxis, continue humidified O2  -ENT following    Chronic LE Lymphedema  -Bilateral LE duplex negative for DVT  -ACE wraps as tolerated  -Leg elevation encouraged     Prediabetes  -HbA1C 6.1 on 8/27  -Dietary/lifestyle modifications  -Repeat HbA1C with PMD on discharge     Sleep  -Melatonin    DVT PPx  -on Eliquis    GI PPx  -On PPI

## 2023-10-22 LAB
COVID-19 NUCLEOCAPSID GAM AB INTERP: POSITIVE
COVID-19 NUCLEOCAPSID GAM AB INTERP: POSITIVE
COVID-19 NUCLEOCAPSID TOTAL GAM ANTIBODY RESULT: 32.7 INDEX — HIGH
COVID-19 NUCLEOCAPSID TOTAL GAM ANTIBODY RESULT: 32.7 INDEX — HIGH
COVID-19 SPIKE DOMAIN AB INTERP: POSITIVE
COVID-19 SPIKE DOMAIN AB INTERP: POSITIVE
COVID-19 SPIKE DOMAIN ANTIBODY RESULT: >250 U/ML — HIGH
COVID-19 SPIKE DOMAIN ANTIBODY RESULT: >250 U/ML — HIGH
SARS-COV-2 IGG+IGM SERPL QL IA: 32.7 INDEX — HIGH
SARS-COV-2 IGG+IGM SERPL QL IA: 32.7 INDEX — HIGH
SARS-COV-2 IGG+IGM SERPL QL IA: >250 U/ML — HIGH
SARS-COV-2 IGG+IGM SERPL QL IA: >250 U/ML — HIGH
SARS-COV-2 IGG+IGM SERPL QL IA: POSITIVE

## 2023-10-22 PROCEDURE — 99232 SBSQ HOSP IP/OBS MODERATE 35: CPT

## 2023-10-22 RX ADMIN — NYSTATIN CREAM 1 APPLICATION(S): 100000 CREAM TOPICAL at 17:32

## 2023-10-22 RX ADMIN — ZINC OXIDE 1 APPLICATION(S): 200 OINTMENT TOPICAL at 20:57

## 2023-10-22 RX ADMIN — APIXABAN 5 MILLIGRAM(S): 2.5 TABLET, FILM COATED ORAL at 06:27

## 2023-10-22 RX ADMIN — Medication 1 DROP(S): at 17:33

## 2023-10-22 RX ADMIN — Medication 6 MILLIGRAM(S): at 20:57

## 2023-10-22 RX ADMIN — ZINC OXIDE 1 APPLICATION(S): 200 OINTMENT TOPICAL at 06:25

## 2023-10-22 RX ADMIN — APIXABAN 5 MILLIGRAM(S): 2.5 TABLET, FILM COATED ORAL at 17:33

## 2023-10-22 RX ADMIN — GABAPENTIN 100 MILLIGRAM(S): 400 CAPSULE ORAL at 20:56

## 2023-10-22 RX ADMIN — Medication 1 TABLET(S): at 17:33

## 2023-10-22 RX ADMIN — Medication 1 TABLET(S): at 08:20

## 2023-10-22 RX ADMIN — SODIUM CHLORIDE 5 MILLILITER(S): 9 INJECTION INTRAMUSCULAR; INTRAVENOUS; SUBCUTANEOUS at 17:40

## 2023-10-22 RX ADMIN — SODIUM CHLORIDE 5 MILLILITER(S): 9 INJECTION INTRAMUSCULAR; INTRAVENOUS; SUBCUTANEOUS at 06:05

## 2023-10-22 RX ADMIN — ZINC OXIDE 1 APPLICATION(S): 200 OINTMENT TOPICAL at 15:21

## 2023-10-22 RX ADMIN — NYSTATIN CREAM 1 APPLICATION(S): 100000 CREAM TOPICAL at 06:35

## 2023-10-22 RX ADMIN — Medication 25 MILLIGRAM(S): at 08:20

## 2023-10-22 NOTE — PROGRESS NOTE ADULT - SUBJECTIVE AND OBJECTIVE BOX
Cc: Gait dysfunction    HPI: Patient with no new medical issues today.  Seen and examined with her partner present  Both report good night rest   She understood and will follow through with recs by pulmonary team regarding timing of vaccines  COVID Abx test result is pending     She requests if she could be allowed to sit on a recliner chair near her bed  I told her that we we can proceed with this after clearance by therapists,     Pain controlled, no chest pain, no N/V, no Fevers/Chills. LBM 10/21  Has been tolerating rehabilitation program.    MEDICATIONS  (STANDING):  apixaban 5 milliGRAM(s) Oral every 12 hours  artificial  tears Solution 1 Drop(s) Both EYES every 12 hours  ascorbic acid 500 milliGRAM(s) Oral daily  atovaquone  Suspension 1500 milliGRAM(s) Oral daily  dextrose 5%. 1000 milliLiter(s) (100 mL/Hr) IV Continuous <Continuous>  dextrose 5%. 1000 milliLiter(s) (50 mL/Hr) IV Continuous <Continuous>  dextrose 50% Injectable 12.5 Gram(s) IV Push once  dextrose 50% Injectable 25 Gram(s) IV Push once  dextrose 50% Injectable 25 Gram(s) IV Push once  folic acid 1 milliGRAM(s) Oral daily  gabapentin 100 milliGRAM(s) Oral at bedtime  glucagon  Injectable 1 milliGRAM(s) IntraMuscular once  hydrocortisone hemorrhoidal Suppository 1 Suppository(s) Rectal two times a day  influenza   Vaccine 0.5 milliLiter(s) IntraMuscular once  lactobacillus acidophilus 1 Tablet(s) Oral two times a day with meals  melatonin 6 milliGRAM(s) Oral at bedtime  metoprolol tartrate 25 milliGRAM(s) Oral two times a day  multivitamin 1 Tablet(s) Oral daily  nystatin Powder 1 Application(s) Topical two times a day  pantoprazole    Tablet 40 milliGRAM(s) Oral before breakfast  psyllium Powder 1 Packet(s) Oral daily  senna 2 Tablet(s) Oral at bedtime  sodium chloride 0.9% lock flush 5 milliLiter(s) IV Push two times a day  zinc oxide 40% Paste 1 Application(s) Topical three times a day    MEDICATIONS  (PRN):  albuterol/ipratropium for Nebulization 3 milliLiter(s) Nebulizer every 6 hours PRN Shortness of Breath and/or Wheezing  aluminum hydroxide/magnesium hydroxide/simethicone Suspension 30 milliLiter(s) Oral every 4 hours PRN Dyspepsia  dextrose Oral Gel 15 Gram(s) Oral once PRN Blood Glucose LESS THAN 70 milliGRAM(s)/deciliter  loperamide 2 milliGRAM(s) Oral three times a day PRN Diarrhea  polyethylene glycol 3350 17 Gram(s) Oral daily PRN Constipation  SIMETHICONE SOFTGELS 250 milliGRAM(s) 250 milliGRAM(s) Oral three times a day PRN for gas  sodium chloride 0.65% Nasal 1 Spray(s) Both Nostrils every 8 hours PRN Nasal Congestion      Vital Signs Last 24 Hrs  T(C): 36.9 (22 Oct 2023 08:13), Max: 37 (21 Oct 2023 21:00)  T(F): 98.5 (22 Oct 2023 08:13), Max: 98.6 (21 Oct 2023 21:00)  HR: 84 (22 Oct 2023 08:13) (78 - 85)  BP: 130/70 (22 Oct 2023 08:13) (105/69 - 130/70)  BP(mean): --  RR: 16 (22 Oct 2023 08:13) (16 - 16)  SpO2: 96% (22 Oct 2023 08:13) (96% - 96%)    Parameters below as of 22 Oct 2023 08:13    O2 Flow (L/min): 1    EXAM  In NAD  HEENT- EOMI  Heart- RRR, S1S2  Lungs- CTA bl.  Abd- + BS, NT  Ext- Leg swelling, non pitting     Neuro- Exam AAO X 3  Generalized motor weakness       Imp: Patient with diagnosis of Interstitial Lung Disease admitted for comprehensive acute rehabilitation.    Plan:  - Continue therapies  - DVT prophylaxis/Rt IJ thrombus--apixiban  --Motor weakness--edema glove both hands and use compression balls both hands prn   --Afib--metoprolol and apixiban   - Skin- Rt groin and sacral wounds, c/w wound care  - Continue current medications;  - Patient is stable to continue current rehabilitation program.

## 2023-10-23 LAB
ALBUMIN SERPL ELPH-MCNC: 2.1 G/DL — LOW (ref 3.3–5)
ALBUMIN SERPL ELPH-MCNC: 2.1 G/DL — LOW (ref 3.3–5)
ALP SERPL-CCNC: 248 U/L — HIGH (ref 40–120)
ALP SERPL-CCNC: 248 U/L — HIGH (ref 40–120)
ALT FLD-CCNC: 82 U/L — HIGH (ref 10–45)
ALT FLD-CCNC: 82 U/L — HIGH (ref 10–45)
ANION GAP SERPL CALC-SCNC: 7 MMOL/L — SIGNIFICANT CHANGE UP (ref 5–17)
ANION GAP SERPL CALC-SCNC: 7 MMOL/L — SIGNIFICANT CHANGE UP (ref 5–17)
AST SERPL-CCNC: 34 U/L — SIGNIFICANT CHANGE UP (ref 10–40)
AST SERPL-CCNC: 34 U/L — SIGNIFICANT CHANGE UP (ref 10–40)
BASOPHILS # BLD AUTO: 0.02 K/UL — SIGNIFICANT CHANGE UP (ref 0–0.2)
BASOPHILS # BLD AUTO: 0.02 K/UL — SIGNIFICANT CHANGE UP (ref 0–0.2)
BASOPHILS NFR BLD AUTO: 0.3 % — SIGNIFICANT CHANGE UP (ref 0–2)
BASOPHILS NFR BLD AUTO: 0.3 % — SIGNIFICANT CHANGE UP (ref 0–2)
BILIRUB SERPL-MCNC: 3 MG/DL — HIGH (ref 0.2–1.2)
BILIRUB SERPL-MCNC: 3 MG/DL — HIGH (ref 0.2–1.2)
BUN SERPL-MCNC: 14 MG/DL — SIGNIFICANT CHANGE UP (ref 7–23)
BUN SERPL-MCNC: 14 MG/DL — SIGNIFICANT CHANGE UP (ref 7–23)
CALCIUM SERPL-MCNC: 8.7 MG/DL — SIGNIFICANT CHANGE UP (ref 8.4–10.5)
CALCIUM SERPL-MCNC: 8.7 MG/DL — SIGNIFICANT CHANGE UP (ref 8.4–10.5)
CHLORIDE SERPL-SCNC: 101 MMOL/L — SIGNIFICANT CHANGE UP (ref 96–108)
CHLORIDE SERPL-SCNC: 101 MMOL/L — SIGNIFICANT CHANGE UP (ref 96–108)
CO2 SERPL-SCNC: 30 MMOL/L — SIGNIFICANT CHANGE UP (ref 22–31)
CO2 SERPL-SCNC: 30 MMOL/L — SIGNIFICANT CHANGE UP (ref 22–31)
CREAT SERPL-MCNC: 0.3 MG/DL — LOW (ref 0.5–1.3)
CREAT SERPL-MCNC: 0.3 MG/DL — LOW (ref 0.5–1.3)
EGFR: 118 ML/MIN/1.73M2 — SIGNIFICANT CHANGE UP
EGFR: 118 ML/MIN/1.73M2 — SIGNIFICANT CHANGE UP
EOSINOPHIL # BLD AUTO: 0.15 K/UL — SIGNIFICANT CHANGE UP (ref 0–0.5)
EOSINOPHIL # BLD AUTO: 0.15 K/UL — SIGNIFICANT CHANGE UP (ref 0–0.5)
EOSINOPHIL NFR BLD AUTO: 2.3 % — SIGNIFICANT CHANGE UP (ref 0–6)
EOSINOPHIL NFR BLD AUTO: 2.3 % — SIGNIFICANT CHANGE UP (ref 0–6)
GLUCOSE SERPL-MCNC: 98 MG/DL — SIGNIFICANT CHANGE UP (ref 70–99)
GLUCOSE SERPL-MCNC: 98 MG/DL — SIGNIFICANT CHANGE UP (ref 70–99)
HCT VFR BLD CALC: 29.3 % — LOW (ref 34.5–45)
HCT VFR BLD CALC: 29.3 % — LOW (ref 34.5–45)
HGB BLD-MCNC: 9 G/DL — LOW (ref 11.5–15.5)
HGB BLD-MCNC: 9 G/DL — LOW (ref 11.5–15.5)
IMM GRANULOCYTES NFR BLD AUTO: 1.1 % — HIGH (ref 0–0.9)
IMM GRANULOCYTES NFR BLD AUTO: 1.1 % — HIGH (ref 0–0.9)
LYMPHOCYTES # BLD AUTO: 0.47 K/UL — LOW (ref 1–3.3)
LYMPHOCYTES # BLD AUTO: 0.47 K/UL — LOW (ref 1–3.3)
LYMPHOCYTES # BLD AUTO: 7.3 % — LOW (ref 13–44)
LYMPHOCYTES # BLD AUTO: 7.3 % — LOW (ref 13–44)
MCHC RBC-ENTMCNC: 30.7 GM/DL — LOW (ref 32–36)
MCHC RBC-ENTMCNC: 30.7 GM/DL — LOW (ref 32–36)
MCHC RBC-ENTMCNC: 31.6 PG — SIGNIFICANT CHANGE UP (ref 27–34)
MCHC RBC-ENTMCNC: 31.6 PG — SIGNIFICANT CHANGE UP (ref 27–34)
MCV RBC AUTO: 102.8 FL — HIGH (ref 80–100)
MCV RBC AUTO: 102.8 FL — HIGH (ref 80–100)
MONOCYTES # BLD AUTO: 0.55 K/UL — SIGNIFICANT CHANGE UP (ref 0–0.9)
MONOCYTES # BLD AUTO: 0.55 K/UL — SIGNIFICANT CHANGE UP (ref 0–0.9)
MONOCYTES NFR BLD AUTO: 8.6 % — SIGNIFICANT CHANGE UP (ref 2–14)
MONOCYTES NFR BLD AUTO: 8.6 % — SIGNIFICANT CHANGE UP (ref 2–14)
NEUTROPHILS # BLD AUTO: 5.16 K/UL — SIGNIFICANT CHANGE UP (ref 1.8–7.4)
NEUTROPHILS # BLD AUTO: 5.16 K/UL — SIGNIFICANT CHANGE UP (ref 1.8–7.4)
NEUTROPHILS NFR BLD AUTO: 80.4 % — HIGH (ref 43–77)
NEUTROPHILS NFR BLD AUTO: 80.4 % — HIGH (ref 43–77)
NRBC # BLD: 0 /100 WBCS — SIGNIFICANT CHANGE UP (ref 0–0)
NRBC # BLD: 0 /100 WBCS — SIGNIFICANT CHANGE UP (ref 0–0)
PLATELET # BLD AUTO: 195 K/UL — SIGNIFICANT CHANGE UP (ref 150–400)
PLATELET # BLD AUTO: 195 K/UL — SIGNIFICANT CHANGE UP (ref 150–400)
POTASSIUM SERPL-MCNC: 3.1 MMOL/L — LOW (ref 3.5–5.3)
POTASSIUM SERPL-MCNC: 3.1 MMOL/L — LOW (ref 3.5–5.3)
POTASSIUM SERPL-SCNC: 3.1 MMOL/L — LOW (ref 3.5–5.3)
POTASSIUM SERPL-SCNC: 3.1 MMOL/L — LOW (ref 3.5–5.3)
PROT SERPL-MCNC: 4.8 G/DL — LOW (ref 6–8.3)
PROT SERPL-MCNC: 4.8 G/DL — LOW (ref 6–8.3)
RBC # BLD: 2.85 M/UL — LOW (ref 3.8–5.2)
RBC # BLD: 2.85 M/UL — LOW (ref 3.8–5.2)
RBC # FLD: 16.2 % — HIGH (ref 10.3–14.5)
RBC # FLD: 16.2 % — HIGH (ref 10.3–14.5)
SODIUM SERPL-SCNC: 138 MMOL/L — SIGNIFICANT CHANGE UP (ref 135–145)
SODIUM SERPL-SCNC: 138 MMOL/L — SIGNIFICANT CHANGE UP (ref 135–145)
WBC # BLD: 6.42 K/UL — SIGNIFICANT CHANGE UP (ref 3.8–10.5)
WBC # BLD: 6.42 K/UL — SIGNIFICANT CHANGE UP (ref 3.8–10.5)
WBC # FLD AUTO: 6.42 K/UL — SIGNIFICANT CHANGE UP (ref 3.8–10.5)
WBC # FLD AUTO: 6.42 K/UL — SIGNIFICANT CHANGE UP (ref 3.8–10.5)

## 2023-10-23 PROCEDURE — 99232 SBSQ HOSP IP/OBS MODERATE 35: CPT

## 2023-10-23 PROCEDURE — 74018 RADEX ABDOMEN 1 VIEW: CPT | Mod: 26

## 2023-10-23 RX ORDER — GABAPENTIN 400 MG/1
200 CAPSULE ORAL AT BEDTIME
Refills: 0 | Status: DISCONTINUED | OUTPATIENT
Start: 2023-10-23 | End: 2023-10-26

## 2023-10-23 RX ORDER — POTASSIUM CHLORIDE 20 MEQ
40 PACKET (EA) ORAL EVERY 4 HOURS
Refills: 0 | Status: COMPLETED | OUTPATIENT
Start: 2023-10-23 | End: 2023-10-23

## 2023-10-23 RX ADMIN — Medication 40 MILLIEQUIVALENT(S): at 12:38

## 2023-10-23 RX ADMIN — APIXABAN 5 MILLIGRAM(S): 2.5 TABLET, FILM COATED ORAL at 06:21

## 2023-10-23 RX ADMIN — Medication 1 MILLIGRAM(S): at 12:38

## 2023-10-23 RX ADMIN — Medication 6 MILLIGRAM(S): at 20:44

## 2023-10-23 RX ADMIN — Medication 1 DROP(S): at 18:08

## 2023-10-23 RX ADMIN — Medication 1 TABLET(S): at 17:54

## 2023-10-23 RX ADMIN — NYSTATIN CREAM 1 APPLICATION(S): 100000 CREAM TOPICAL at 06:22

## 2023-10-23 RX ADMIN — NYSTATIN CREAM 1 APPLICATION(S): 100000 CREAM TOPICAL at 18:08

## 2023-10-23 RX ADMIN — Medication 1 TABLET(S): at 08:24

## 2023-10-23 RX ADMIN — Medication 25 MILLIGRAM(S): at 06:22

## 2023-10-23 RX ADMIN — ZINC OXIDE 1 APPLICATION(S): 200 OINTMENT TOPICAL at 20:45

## 2023-10-23 RX ADMIN — APIXABAN 5 MILLIGRAM(S): 2.5 TABLET, FILM COATED ORAL at 17:54

## 2023-10-23 RX ADMIN — Medication 25 MILLIGRAM(S): at 17:54

## 2023-10-23 RX ADMIN — SODIUM CHLORIDE 5 MILLILITER(S): 9 INJECTION INTRAMUSCULAR; INTRAVENOUS; SUBCUTANEOUS at 18:08

## 2023-10-23 RX ADMIN — ZINC OXIDE 1 APPLICATION(S): 200 OINTMENT TOPICAL at 13:39

## 2023-10-23 RX ADMIN — Medication 500 MILLIGRAM(S): at 12:38

## 2023-10-23 RX ADMIN — ATOVAQUONE 1500 MILLIGRAM(S): 750 SUSPENSION ORAL at 12:38

## 2023-10-23 RX ADMIN — GABAPENTIN 200 MILLIGRAM(S): 400 CAPSULE ORAL at 20:45

## 2023-10-23 RX ADMIN — SODIUM CHLORIDE 5 MILLILITER(S): 9 INJECTION INTRAMUSCULAR; INTRAVENOUS; SUBCUTANEOUS at 06:30

## 2023-10-23 RX ADMIN — Medication 40 MILLIEQUIVALENT(S): at 17:54

## 2023-10-23 RX ADMIN — Medication 1 TABLET(S): at 12:38

## 2023-10-23 RX ADMIN — ZINC OXIDE 1 APPLICATION(S): 200 OINTMENT TOPICAL at 06:22

## 2023-10-23 NOTE — CHART NOTE - NSCHARTNOTEFT_GEN_A_CORE
Nutrition Follow Up Note   Source: Medical Record [X] Patient [X] Family [X] pt's partner provided info    Diet: Consistent carbohydrate, no snacks, Darinel BID  Pt tolerating diet with fair-good PO intake, eating % of meals. Pt provided food preferences. Occasionally, pt will consume outside food. Pt has not been drinking Darinel (provides 100 kcal, 14 g amino acids). Explained the benefits, but pt would prefer to try the glucerna in her fridge instead. Encouraged pt to try the Glucerna and if she likes it, we will d/c Darinel and provide Glucerna instead. Pt provided with education on the importance of high biological protein to aid in wound healing. Encouraged protein intake at all meals. Also provided w/ education on consistent carb diet + glycemic control in order to promote proper wound healing.    Enteral/Parenteral Nutrition: N/A    Current Weight: 256.6 lbs (10-5)    Pertinent Medications: MEDICATIONS  (STANDING):  apixaban 5 milliGRAM(s) Oral every 12 hours  artificial  tears Solution 1 Drop(s) Both EYES every 12 hours  ascorbic acid 500 milliGRAM(s) Oral daily  atovaquone  Suspension 1500 milliGRAM(s) Oral daily  dextrose 5%. 1000 milliLiter(s) (50 mL/Hr) IV Continuous <Continuous>  dextrose 5%. 1000 milliLiter(s) (100 mL/Hr) IV Continuous <Continuous>  dextrose 50% Injectable 12.5 Gram(s) IV Push once  dextrose 50% Injectable 25 Gram(s) IV Push once  dextrose 50% Injectable 25 Gram(s) IV Push once  folic acid 1 milliGRAM(s) Oral daily  gabapentin 200 milliGRAM(s) Oral at bedtime  glucagon  Injectable 1 milliGRAM(s) IntraMuscular once  hydrocortisone hemorrhoidal Suppository 1 Suppository(s) Rectal two times a day  influenza   Vaccine 0.5 milliLiter(s) IntraMuscular once  lactobacillus acidophilus 1 Tablet(s) Oral two times a day with meals  melatonin 6 milliGRAM(s) Oral at bedtime  metoprolol tartrate 25 milliGRAM(s) Oral two times a day  multivitamin 1 Tablet(s) Oral daily  nystatin Powder 1 Application(s) Topical two times a day  pantoprazole    Tablet 40 milliGRAM(s) Oral before breakfast  potassium chloride    Tablet ER 40 milliEquivalent(s) Oral every 4 hours  psyllium Powder 1 Packet(s) Oral daily  senna 2 Tablet(s) Oral at bedtime  sodium chloride 0.9% lock flush 5 milliLiter(s) IV Push two times a day  zinc oxide 40% Paste 1 Application(s) Topical three times a day    MEDICATIONS  (PRN):  albuterol/ipratropium for Nebulization 3 milliLiter(s) Nebulizer every 6 hours PRN Shortness of Breath and/or Wheezing  aluminum hydroxide/magnesium hydroxide/simethicone Suspension 30 milliLiter(s) Oral every 4 hours PRN Dyspepsia  dextrose Oral Gel 15 Gram(s) Oral once PRN Blood Glucose LESS THAN 70 milliGRAM(s)/deciliter  loperamide 2 milliGRAM(s) Oral three times a day PRN Diarrhea  polyethylene glycol 3350 17 Gram(s) Oral daily PRN Constipation  SIMETHICONE SOFTGELS 250 milliGRAM(s) 250 milliGRAM(s) Oral three times a day PRN for gas  sodium chloride 0.65% Nasal 1 Spray(s) Both Nostrils every 8 hours PRN Nasal Congestion      Pertinent Labs:  10-23 Na138 mmol/L Glu 98 mg/dL K+ 3.1 mmol/L<L> Cr  0.30 mg/dL<L> BUN 14 mg/dL 10-23 Alb 2.1 g/dL<L> 09-25 Chol --    LDL --    HDL --    Trig 199 mg/dL<H>        Skin: stage II pressure injury R inner thigh, stage II pressure injury R inner buttock, stage II L inner buttock     Edema: edema Per nursing flowsheets     Last BM: on 10/22 Per nursing flowsheets     Estimated Needs:   [X] No Change since Previous Assessment  [ ] Recalculated:     Previous Nutrition Diagnosis:   1. Severe malnutrition  2. Increased nutrient needs    Nutrition Diagnosis is [X] Ongoing  - addressed with Darinel BID, MVI, encouraged protein intake at all meals, ongoing nutrition education    New Nutrition Diagnosis: [X] Not Applicable  [ ] Inadequate Protein Energy Intake   [ ] Inadequate Oral Intake   [ ] Excessive Energy Intake   [ ] Increased Nutrient Needs   [ ] Obesity   [ ] Altered GI Function   [ ] Unintended Weight Loss   [ ] Food & Nutrition Related Knowledge Deficit  [ ] Limited Adherence to nutrition related recommendations   [ ] Malnutrition      Interventions:   1. Will monitor acceptance of Glucerna (pt has it in her possession) - if agreeable, add Glucerna BID, and d/c Darinel  2. Encourage HBV protein at all meals  3. Food preferences obtained to optimize macronutrient intake  4. Ongoing diet education    Monitoring & Evaluation:   [X] Weights   [X] PO Intake   [X] Follow Up (Per Protocol)  [X] Tolerance to Diet Prescription   [X] Other: Labs     RD Remains Available.  Desire Livingston RD

## 2023-10-23 NOTE — PROGRESS NOTE ADULT - ASSESSMENT
62 year old female with PMH of pAFIB and sciatica; who presented to [facility] ED with SOB, and was found to have groundglass opacities and interstitial lung disease. She was treated with empiric Vancomycin and Zosyn. She underwent a bronchoscopy with bronchoalveolar lavage and pulse steroids. She was given IV diuretics for increased pulmonary congestion, but her respiratory status continued to worsen.  On 9/13, she was transferred to [facility] for ECMO requirements. She was further treated with Plasmapheresis, Rituximab and high-dose steroids, with significant improvement. Her overall hospital course was complicated by thrombocytopenia (s/p several platelet transfusions), leukocytosis (infectious workup entirely negative- s/p Vanco and Ceftriaxone), AFIB with RVR (resolved with Metoprolol), dysphagia (requiring NGT), transaminitis (secondary to acute illness and hypotension),  non-occlusive RIJ DVT (started on Lovenox). Patient now admitted for a multidisciplinary rehab program- pt/ot/dvt ppx    Gait Instability/Functional Impairment  Interstitial Lung Disease, s/p ECMO   Mixed Connective Tissue Disease   Acute Hypoxia   -s/p Plasmapheresis, Rituximab and high- dose steroids   -Albuterol/Ipratropium Nebulizer every 6 hours PRN  -Continue PO Medrol taper- Plan will be to taper by ~20% every 2 weeks  -Continue PPx Mepron   -Repeat CT Chest in 6 weeks (~11/11)  -O2 supplementation for O2Sat >92%  -Pulmonary following     Leukocytosis   -May be related to steroids, she has been afebrile and hemodynamically stable  -Monitor CBC    Anemia  -Likely multifactorial  -H/H stable  -Monitor CBC     pAFIB  -Continue Metoprolol   -Continue Eliquis     RIJ DVT found at ECMO cannula   -RUE duplex 10/6 showed No evidence of right upper extremity deep venous thrombosis. 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  -Continue Eliquis   -Vascular consult appreciated     Transaminitis, jaundice  -Secondary to acute illness and hypotension at [facility]  -Trend LFTs, bili-  downtrending  -Avoid hepatoxic agents   -Outpatient follow up     Tinnitus/Epistaxis   -Will need outpatient ENT follow up for tinnitus  -No further epistaxis, continue humidified O2  -ENT following    Chronic LE Lymphedema  -Bilateral LE duplex negative for DVT  -ACE wraps as tolerated  -Leg elevation encouraged     Prediabetes  -HbA1C 6.1 on 8/27  -Dietary/lifestyle modifications  -Repeat HbA1C with PMD on discharge     DVT PPx  -on Eliquis    GI PPx  -On PPI  62 year old female with PMH of pAFIB and sciatica; who presented to [facility] ED with SOB, and was found to have groundglass opacities and interstitial lung disease. She was treated with empiric Vancomycin and Zosyn. She underwent a bronchoscopy with bronchoalveolar lavage and pulse steroids. She was given IV diuretics for increased pulmonary congestion, but her respiratory status continued to worsen.  On 9/13, she was transferred to [facility] for ECMO requirements. She was further treated with Plasmapheresis, Rituximab and high-dose steroids, with significant improvement. Her overall hospital course was complicated by thrombocytopenia (s/p several platelet transfusions), leukocytosis (infectious workup entirely negative- s/p Vanco and Ceftriaxone), AFIB with RVR (resolved with Metoprolol), dysphagia (requiring NGT), transaminitis (secondary to acute illness and hypotension),  non-occlusive RIJ DVT (started on Lovenox). Patient now admitted for a multidisciplinary rehab program- pt/ot/dvt ppx    Gait Instability/Functional Impairment  Interstitial Lung Disease, s/p ECMO   Mixed Connective Tissue Disease   Acute Hypoxia   -s/p Plasmapheresis, Rituximab and high- dose steroids   -Albuterol/Ipratropium Nebulizer every 6 hours PRN  -Continue PPx Mepron   -Repeat CT Chest in 6 weeks (~11/11)  -O2 supplementation for O2Sat >92%  -Pulmonary following     Leukocytosis   -May be related to steroids, she has been afebrile and hemodynamically stable  -Monitor CBC    Anemia  -Likely multifactorial  -H/H stable  -Monitor CBC     pAFIB  -Continue Metoprolol   -Continue Eliquis     RIJ DVT found at ECMO cannula   -RUE duplex 10/6 showed No evidence of right upper extremity deep venous thrombosis. 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  -Continue Eliquis   -Vascular consult appreciated     Transaminitis, jaundice  -Secondary to acute illness and hypotension at [facility]  -Trend LFTs, bili-  downtrending  -Avoid hepatoxic agents   -Outpatient follow up     Tinnitus/Epistaxis   -Will need outpatient ENT follow up for tinnitus  -No further epistaxis, continue humidified O2  -ENT following    Chronic LE Lymphedema  -Bilateral LE duplex negative for DVT  -ACE wraps as tolerated  -Leg elevation encouraged     Prediabetes  -HbA1C 6.1 on 8/27  -Dietary/lifestyle modifications  -Repeat HbA1C with PMD on discharge     DVT PPx  -on Eliquis    GI PPx  -On PPI

## 2023-10-23 NOTE — PROGRESS NOTE ADULT - ASSESSMENT
Assessment/Plan:  ALIZA FOLEY is a 62 year old female with PMH of pAFIB and sciatica; who presented to [facility] ED with SOB, and was found to have groundglass opacities and interstitial lung disease. She was treated with empiric Vancomycin and Zosyn. She underwent a bronchoscopy with bronchoalveolar lavage and pulse steroids. She was given IV diuretics for increased pulmonary congestion, but her respiratory status continued to worsen.  On 9/13, she was transferred to [facility] for ECMO requirements. She was further treated with Plasmapheresis, Rituximab and high-dose steroids, with significant improvement. Her overall hospital course was complicated by thrombocytopenia (s/p several platelet transfusions), leukocytosis (infectious workup entirely negative- s/p Vanco and Ceftriaxone), AFIB with RVR (resolved with Metoprolol), dysphagia (requiring NGT), transaminitis (secondary to acute illness and hypotension),  non-occlusive RIJ DVT (started on Lovenox). Patient now admitted for a multidisciplinary rehab program. 10-05-23 @ 13:18    * Repeat Ab XR to assess stool burden  * On Methylprednisolone taper   * Rehab team to explore all potential DC options (EMIGDIO, extended stay/self pay?, etc)   * Wound care following  * Pulmonary team following     #Interstitial Lung Disease and Mixed connective tissue disease  - Gait Instability, ADL impairments and Functional impairments: start Comprehensive Rehab Program of PT/OT/SLP - 3 hours a day, 5 days a week  - P&O as needed   - Non-specific Interstitial Lung Disease  - Requiring ECMO   - Improvement s/p Plasmapheresis, Rituximab and high- dose steroids   - Albuterol/Ipratropium Nebulizer every 6 hours  - Mepron 1500mg daily  - PO Medrol ( due to liver dysfunction): Plan will be to taper by ~20% every 2 weeks  Medrol   64mg x 2 weeks  56mg x 2 weeks  44mg x 2 weeks   36mg x 2 weeks - Follow up Outpatient   -- Repeat CT Chest in 6 weeks   --Pulmonary team--f/u with patient and clarify when she can get immunizations    -Perform BL elbow flexion & elbow extension with antigravity in therapy      10/18:  Meeting at bed side  We had a brief meeting at bed side at this time  We discussed details from IDT including projected dc date of 10/27, (CMG 10/21), but the extended time was to make up for time spent addressing medical issues on acute rehab admission, during which he got limited therapy  They appreciated this  But they has some requests  1--To explore extension of acute rehab treatment by at 2 wk period from 10/27, with the intention of home dc afterwards  2--They are willing to personally pay, depending on the cost if insurance will not cover this  3--If both requests not possible, they will agree to EMIGDIO provided patient's partner will be allowed to care for patient in same room in the EMIGDIO facility (they report being told that in [facility], this arrangement will not be possible) if that is true, they would pre cody Emerge or [facility] SARs  She agrees to continue therapy while SW team explores these options    SUBSEQUENT MEETING WITH  NURSE MANAGER AND SUPERVISION, NP and SW  We discussed patient's/family's requests  Plan is to explore these requests and update patient on possibilities of each    #pAFIB/Rt Ij thrombus  - Metoprolol 25mg BID and lovenox  -- Cardiology consult--recm can switch to oral AC  --Await discussion with patient's cardiologist before switch to oral AC as requested by patient   (recent GI bleed, from hemorrhoid, resolved)    # Chronic Tinnitus   - ENT consult for hx of tinnitus and feeling of ear fullness  - ENT review and recs appreciate, Patient already made ENT appointment for f/u    # Lymphedema both legs -chronic and Rt IJ thrombus  - ACE Wrap BL LEs  - BL LE doppler negative for DVT  - BL UE doppler with chronic thrombotic changes in RIJ   - Surg consult recs--commence anticoagulation as recs by cardiology  - Eliquis 5mg BID - tolerating well     #Transaminitis --LFT Down trending   - Secondary to acute illness and hypotension at [facility]  - Outpatient follow up     #Neuropathy  --continue gabapentin 10/16 and E stim, tolerating gabapentin, willing to continue trial of E stim for now  --Work on motor strengthening, priority for UE as preferred by patient     #Sleep  - Melatonin 6mg at HS    #Skin  - Skin: Left lower abdomen ruptured blister 3.0 x 1.0, stage 2 pressure injury to right buttock 4 x1 and left buttock 3x1, stage 2 pressure injury ti right inner thigh 0.2 x 0.2, right groin wound 10.5 x 2.0, Left groin wound 5 x 5,  right neck scab wound  -- MAD to skin folds- Nystatin to submammary and abdominal pannus BID  -- MAD to L groin- cleanse with NS, Triad cream daily  -- Mad to R groin- Nystatin powder daily   -- R groin wound-- aquacel packing, Triad to periwound, Allevyn foam- daily and PRN   - Pressure injury/Skin: OOB to Chair, PT/OT   - Offload pressure, low air loss support surfaces, T&P every 2 hours   --Wound care consult-10/9 -Multiple wounds--perineal and buttock/sacral, recs appreciated   --Suggest Continue treatments as below:   Twice daily & PRN Normal Saline Cleanse of abdominal pannus & breast folds, perineal, Left & Right inner buttock erosions.  Pat thoroughly dry.   Apply Desitin generously twice daily (& PRN) to perineal, buttocks, and left groin erosions, leave open to air.    Apply Nystatin powder to abdominal pannus and breast folds.  Suggest use of Interdry cloths in folds to 'wick' moisture.  Suggest daily Normal Saline Cleanse of right groin surgical wound, pat dry.  Apply Cavillon film barrier to intact periwound skin.  Place Aquacell strip over open area, cover with foam dressing.  - Wound care following         #Pain Mgmt   - Tylenol PRN   - Gabapentin 100mg at HS     #GI/Bowel Mgmt   - Pantoprazole  - Senna  --on hold due to recent Loose BM  - PRN: Maalox   - Stool count  - Start Lactobacillus BID   -- AB XR mod stool burden on transverse colon  - S/p enema and lactulose  - Repeat Ab XR to re-assess stool burden     # Alternating bowel function --commenced probiotic, latobacilus   * Leucocytosis probably steroid related and partly due to her Mixed Connective Tissue Disease, will continue monitoring     #/Bladder Mgmt --voiding     #FEN   #Dysphagia  - Diet - Minced & Moist  [CC]    - Dysphaiga- SLP evaluation     #Health maintenance  - Folic Acid   - MVI  - Ocean nasal spray    # Difficulty with access to peripheral veins-- Blood draws from Left arm Medline     #Precautions / PROPHYLAXIS:   - Falls  - ortho: Weight bearing status: WBAT   - Lungs: Aspiration, Incentive Spirometer   - DVT PPX: Eliquis 5 mg BID   ---------------------------    * 10/19 Labs unremarkable,,stable anemia, LFT elevated, but downtrending, leucocytosis, non progressive    Liaison with family  Patient's partner present during review, details of review d/w her, detailed explanation of therapy protocol explained and she was happy with same  10/9--Partner present during review and discussed details treatment plan with her    Liaison with providers  Consult recs form multiple specialities being implemented  Surgical consult and recs 10/9--agreed with plan for AC for Rt IJ chronic thrombus    10/10--No response to multiple calls to patient's private cardiologist Dr Otto Stratton  ph       IDT conference on 10/10  TDD: 10/27 to home vs EMIGDIO.  Barriers: Obesity, poor skin integrity, poor endurance, BL UE weakness, poor coordination, pain, incontinent x2.  Social Work: Lives with spouse in [place] 6 months of year. DC to apt in [place] with elevator, no steps. Supportive spouse.   DME: Has 02 compressor.   OT: Max A for eating/grooming. Total for all other ADLs and transfers. Goal of Mod A.   PT: Damaris 2-3pr A. Sitting EOB is max.   SLP: SBS with TLs. Mild cog. Severe aphonia.  RT: --n/a   Functional level on DC: Min A for transfers.  ---------------------------  OUTPATIENT/FOLLOW UP:    Trae Whitney  Pulmonary Disease  [address], [address] [place] [address]  Phone: [phone]  Fax: [phone]  Follow Up Time: 2 weeks    Ryanne Allen  Rheumatology  [address] [place] [address] [address]  Phone: [phone]  Fax: [phone]  Follow Up Time: 1 week    Kyle Pierce  Internal Medicine  [address] [place] [address], [address]  [place] [address]  Phone: [phone]  Fax: [phone]  Follow Up Time: 2 weeks    Addy Powers  Pulmonary Disease  [address] [facility], [address]  [place], [place] [address]  Phone: [phone]  Fax: [phone]  Follow Up Time:     Kwame Hawley  Critical Care Medicine  [address] [facility], [address]  [place] [address]  Phone: [phone]  Fax: [phone]  Follow Up Time:     Neena Borrego  Rheumatology  [address] [place] [address], [address]  [place] [address]  Phone: [phone]  Fax: [phone]  Follow Up Time:    [facility]  [facility] [address]  Scheduled Appointment: 09/13/2023  2:40 PM  ---------------------------   Assessment/Plan:  ALIZA FOLEY is a 62 year old female with PMH of pAFIB and sciatica; who presented to [facility] ED with SOB, and was found to have groundglass opacities and interstitial lung disease. She was treated with empiric Vancomycin and Zosyn. She underwent a bronchoscopy with bronchoalveolar lavage and pulse steroids. She was given IV diuretics for increased pulmonary congestion, but her respiratory status continued to worsen.  On 9/13, she was transferred to [facility] for ECMO requirements. She was further treated with Plasmapheresis, Rituximab and high-dose steroids, with significant improvement. Her overall hospital course was complicated by thrombocytopenia (s/p several platelet transfusions), leukocytosis (infectious workup entirely negative- s/p Vanco and Ceftriaxone), AFIB with RVR (resolved with Metoprolol), dysphagia (requiring NGT), transaminitis (secondary to acute illness and hypotension),  non-occlusive RIJ DVT (started on Lovenox). Patient now admitted for a multidisciplinary rehab program. 10-05-23 @ 13:18    * Repeat Ab XR to assess stool burden  * On Methylprednisolone taper   * Rehab team to explore all potential DC options (EMIGDIO, extended stay/self pay?, etc)   * Wound care following  * Ot assessment of sitting balance and trunk control before clearance to use recliner chair at bed side   * Pulmonary team following   * Xray abd, f/u recent constipation   * Pulmonary f/u review and recs regarding vaccination appreciated , currently immune against COVID     #Interstitial Lung Disease and Mixed connective tissue disease  - Gait Instability, ADL impairments and Functional impairments: start Comprehensive Rehab Program of PT/OT/SLP - 3 hours a day, 5 days a week  - P&O as needed   - Non-specific Interstitial Lung Disease  - Requiring ECMO   - Improvement s/p Plasmapheresis, Rituximab and high- dose steroids   - Albuterol/Ipratropium Nebulizer every 6 hours  - Mepron 1500mg daily  - PO Medrol ( due to liver dysfunction): Plan will be to taper by ~20% every 2 weeks  Medrol   64mg x 2 weeks  56mg x 2 weeks  44mg x 2 weeks   36mg x 2 weeks - Follow up Outpatient   -- Repeat CT Chest in 6 weeks   --Pulmonary team--f/u with patient and clarify when she can get immunizations    -Perform BL elbow flexion & elbow extension with antigravity in therapy      10/18:  Meeting at bed side  We had a brief meeting at bed side at this time  We discussed details from IDT including projected dc date of 10/27, (CMG 10/21), but the extended time was to make up for time spent addressing medical issues on acute rehab admission, during which he got limited therapy  They appreciated this  But they has some requests  1--To explore extension of acute rehab treatment by at 2 wk period from 10/27, with the intention of home dc afterwards  2--They are willing to personally pay, depending on the cost if insurance will not cover this  3--If both requests not possible, they will agree to EMIGDIO provided patient's partner will be allowed to care for patient in same room in the EMIGDIO facility (they report being told that in [facility], this arrangement will not be possible) if that is true, they would pre cody Emerge or [facility] SARs  She agrees to continue therapy while SW team explores these options    SUBSEQUENT MEETING WITH  NURSE MANAGER AND SUPERVISION, NP and SW  We discussed patient's/family's requests  Plan is to explore these requests and update patient on possibilities of each    #pAFIB/Rt Ij thrombus  - Metoprolol 25mg BID and lovenox  -- Cardiology consult--recm can switch to oral AC  --Await discussion with patient's cardiologist before switch to oral AC as requested by patient   (recent GI bleed, from hemorrhoid, resolved)    # Chronic Tinnitus   - ENT consult for hx of tinnitus and feeling of ear fullness  - ENT review and recs appreciate, Patient already made ENT appointment for f/u    # Lymphedema both legs -chronic and Rt IJ thrombus  - ACE Wrap BL LEs  - BL LE doppler negative for DVT  - BL UE doppler with chronic thrombotic changes in RIJ   - Surg consult recs--commence anticoagulation as recs by cardiology  - Eliquis 5mg BID - tolerating well     #Transaminitis --LFT Down trending   - Secondary to acute illness and hypotension at [facility]  - Outpatient follow up     #Neuropathy  --continue gabapentin 10/16 and E stim, tolerating gabapentin, willing to continue trial of E stim for now  --Work on motor strengthening, priority for UE as preferred by patient     #Sleep  - Melatonin 6mg at HS    #Skin  - Skin: Left lower abdomen ruptured blister 3.0 x 1.0, stage 2 pressure injury to right buttock 4 x1 and left buttock 3x1, stage 2 pressure injury ti right inner thigh 0.2 x 0.2, right groin wound 10.5 x 2.0, Left groin wound 5 x 5,  right neck scab wound  -- MAD to skin folds- Nystatin to submammary and abdominal pannus BID  -- MAD to L groin- cleanse with NS, Triad cream daily  -- Mad to R groin- Nystatin powder daily   -- R groin wound-- aquacel packing, Triad to periwound, Allevyn foam- daily and PRN   - Pressure injury/Skin: OOB to Chair, PT/OT   - Offload pressure, low air loss support surfaces, T&P every 2 hours   --Wound care consult-10/9 -Multiple wounds--perineal and buttock/sacral, recs appreciated   --Suggest Continue treatments as below:   Twice daily & PRN Normal Saline Cleanse of abdominal pannus & breast folds, perineal, Left & Right inner buttock erosions.  Pat thoroughly dry.   Apply Desitin generously twice daily (& PRN) to perineal, buttocks, and left groin erosions, leave open to air.    Apply Nystatin powder to abdominal pannus and breast folds.  Suggest use of Interdry cloths in folds to 'wick' moisture.  Suggest daily Normal Saline Cleanse of right groin surgical wound, pat dry.  Apply Cavillon film barrier to intact periwound skin.  Place Aquacell strip over open area, cover with foam dressing.  - Wound care following         #Pain Mgmt   - Tylenol PRN   - Gabapentin 100mg at HS     #GI/Bowel Mgmt   - Pantoprazole  - Senna  --on hold due to recent Loose BM  - PRN: Maalox   - Stool count  - Start Lactobacillus BID   -- AB XR mod stool burden on transverse colon  - S/p enema and lactulose  - Repeat Ab XR to re-assess stool burden     # Alternating bowel function --commenced probiotic, latobacilus   * Leucocytosis probably steroid related and partly due to her Mixed Connective Tissue Disease, will continue monitoring     #/Bladder Mgmt --voiding     #FEN   #Dysphagia  - Diet - Minced & Moist  [CC]    - Dysphaiga- SLP evaluation     #Health maintenance  - Folic Acid   - MVI  - Ocean nasal spray    # Difficulty with access to peripheral veins-- Blood draws from Left arm Medline     #Precautions / PROPHYLAXIS:   - Falls  - ortho: Weight bearing status: WBAT   - Lungs: Aspiration, Incentive Spirometer   - DVT PPX: Eliquis 5 mg BID   ---------------------------    * 10/23 Labs unremarkable,,stable anemia, LFT elevated, but downtrending, leucocytosis, non progressive    Liaison with family  Patient's partner present during review, details of review d/w her, detailed explanation of therapy protocol explained and she was happy with same  10/9--Partner present during review and discussed details treatment plan with her    Liaison with providers  Consult recs form multiple specialities being implemented  Surgical consult and recs 10/9--agreed with plan for AC for Rt IJ chronic thrombus    10/10 --No response to multiple calls to patient'srivate cardiologist Dr Otto Stratton  ph       IDT conference on 10/10  TDD: 10/27 to home vs EMIGDIO.  Barriers: Obesity, poor skin integrity, poor endurance, BL UE weakness, poor coordination, pain, incontinent x2.  Social Work: Lives with spouse in [place] 6 months of year. DC to apt in [place] with elevator, no steps. Supportive spouse.   DME: Has 02 compressor.   OT: Max A for eating/grooming. Total for all other ADLs and transfers. Goal of Mod A.   PT: Damaris 2-3pr A. Sitting EOB is max.   SLP: SBS with TLs. Mild cog. Severe aphonia.  RT: --n/a   Functional level on DC: Min A for transfers.  ---------------------------  OUTPATIENT/FOLLOW UP:    Trae Whitney  Pulmonary Disease  [address] [place] [address], [address] [place] [address]  Phone: [phone]  Fax: [phone]  Follow Up Time: 2 weeks    Ryanne Allen  Rheumatology  [address] [address]  Phone: [phone]  Fax: [phone]  Follow Up Time: 1 week    Kyle Pierce  Internal Medicine  [address] [place] [address], [address]  [place] [address]  Phone: [phone]  Fax: [phone]  Follow Up Time: 2 weeks    Priya Addyobdulio Harley  Pulmonary Disease  [address] [facility], [address]  [place], [place] [address]  Phone: [phone]  Fax: [phone]  Follow Up Time:     Kwame Hawley  Critical Care Medicine  [address] [facility] [facility] [address]  [place] [address]  Phone: [phone]  Fax: [phone]  Follow Up Time:     Neena Borrego  Rheumatology  [address] [place] [address], [address]  [place] [address]  Phone: [phone]  Fax: [phone]  Follow Up Time:    [facility]  [facility] [address]  Scheduled Appointment: 09/13/2023  2:40 PM  ---------------------------

## 2023-10-23 NOTE — PROGRESS NOTE ADULT - NS ATTEND AMEND GEN_ALL_CORE FT
Seen and examined, findings as noted    No interval med complaint   Slept well    Requests regarding use of recliner chair noted, await therapy assessment      Labs no acute changes    Engaging in therapy  Still has marked motor weakness    Continue therapy  Xray abd f/u recent constipation  Await therapy assessment and clearance to use recliner chair

## 2023-10-23 NOTE — PROGRESS NOTE ADULT - ASSESSMENT
Physical Examination:  GENERAL:                Alert, Oriented, No acute distress.     PULM:                     Bilateral air entry,  poor air entry at bases No Rales, No Rhonchi, No Wheezing  CVS:                         S1, S2,  No Murmur  ABD:                        Soft, nondistended, nontender, normoactive bowel sounds,   EXT:                        Trace non pitting edema, nontender, No Cyanosis or Clubbing   NEURO:                  Alert, oriented, interactive, bilateral upper and lower extremity weakness   PSYC:                      Calm, + Insight.      Assessment  62F PMH A-Fib with recently diagnosed MCTD-ILD (+ARIANA 1:1280, RNP>8, Sm>8) who was admitted to [facility] with acute hypoxemic respiratory failure that initially responded to steroids, then with worsening after taper with development of acute hypoxemic and hypercapnic respiratory failure requiring intubation and VV- ECMO on 9/13, then transferred to [facility], concerning for DAH vs ILD flare, decannulated from VV-ECMO 9/21, extubated 9/22. S/p pulse steroids, PLEX (9/15, 9/18, 9/20) and Rituximab (9/16 & 10/3). Course c/b severe leukopenia s/p filgastrim, shock liver with slow to resolved hyperbilirubinemia, RIJ DVT, oropharyngeal dysphagia, and recurrent SVT. Repeat CT chest on 9/30 with markedly improved bilateral groundglass opacities with resolved lung consolidations. Now in acute rehab on 4 lpm NC oxygen and tapering dose of medrol.     Problem List:  1. Interstitial lung disease   2. Mixed connective tissue disease   3. Acute hypoxia  4. RIJ DVT     Plan:  - Stable respiratory status, on NC@ 1 LPM at rest, can increase LPM with activity with goal SpO2>92%  - Cont. Methylprednisolone PO, taper as per rheumatology recs. from [facility]   - Continue atovaquone for PCP prophylaxis  - S/p Rituximab 9/16 and 10/3  - S/p PLEX during MICU stay  - Repeat CT scan in 6 weeks ~ November 11  - No evidence of active infection, monitor off antibiotics  - Cont. anticoagulation for DVT associated with ECMO cannula  - Consider incentive spirometry   - Care per primary team  - Discussed with spouse at bedside   - PT OOB as tolerated  - Outpatient pulmonary and rheumatology follow up upon discharge  - based on ab results patient appears to have immunity    to covid  from h/o vaccine and h/o prior unknown reaction.  I do not believe needs repeat vaccination, will sarah to [facility] gridlines when she is due for booster shots.

## 2023-10-23 NOTE — PROGRESS NOTE ADULT - SUBJECTIVE AND OBJECTIVE BOX
CC: Non-specific Interstitial Lung Disease     Today's Subjective & Objective Findings:  Patient seen and examined at bedside this AM.  Partner present.  Admits to sleeping well.   Last BM on 10/22, per patient.  Discussed sitting in recliner during daytime.  No other complaints at this time.       ROS --No dyspnea, head ache, chest or abd pain  Alternative bowel movements       Therapy-- engaging, motivated  She will continue therapy on motor strengthening  Will continue to engage for  E stim to upper extremity muscles       Vital Signs Last 24 Hrs  T(C): 37.1 (23 Oct 2023 07:45), Max: 37.1 (23 Oct 2023 07:45)  T(F): 98.7 (23 Oct 2023 07:45), Max: 98.7 (23 Oct 2023 07:45)  HR: 77 (23 Oct 2023 07:45) (66 - 90)  BP: 96/64 (23 Oct 2023 07:45) (96/64 - 124/80)  BP(mean): --  RR: 16 (23 Oct 2023 07:45) (16 - 16)  SpO2: 95% (23 Oct 2023 07:45) (95% - 97%)        PHYSICAL EXAM:  Gen -  Comfortable, sitting on a WC, Oxy sats normal on NC oxy 1L, oxy sats  persistently > 93 %  HEENT - , nostrils are moist, no bleeding, mod icterus  Neck - Supple, No limited ROM, O2 via NC  Pulm - good   Cardiovascular - RRR, S1S2  Chest - good chest expansion,   Abdomen - Soft, non tender, +BS,   Extremities - No Cyanosis, no clubbing, 1+ edema to b/l feet, non pitting,    no calf tenderness, LUE Med line    Neuro-     Cognitive - awake, alert, fully oriented, engaging, speech normal      Motor -                    LEFT    UE - 4/5                    RIGHT UE - 4/5                     LEFT    LE - 2 +-/5 limited by leg edema                    RIGHT LE - 2+-/5  limitted by leg edema,, which is reducing      Sensory - Intact        Reflexes - DTR 1+      Coordination - FTN  impaired  due to weakness   MSK: soreness and weakness  Psychiatric - Mood stable, Affect WNL  Skin: Left lower abdomen ruptured blister 3.0 x 1.0, stage 2 pressure injury to right buttock 4 x1 and left buttock 3x1, stage 2 pressure injury ti right inner thigh 0.2 x 0.2, right groin wound 10.5 x 2.0, Left groin wound 5 x 5,      MMT--Able to contact B/L upper back muscles,   Elbow abduction and adduction, gravity eliminated 2+      LABS:                        9.0    6.42  )-----------( 195      ( 23 Oct 2023 06:37 )             29.3     10-23    138  |  101  |  14  ----------------------------<  98  3.1<L>   |  30  |  0.30<L>    Ca    8.7      23 Oct 2023 06:37    TPro  4.8<L>  /  Alb  2.1<L>  /  TBili  3.0<H>  /  DBili  x   /  AST  34  /  ALT  82<H>  /  AlkPhos  248<H>  10-23      Urinalysis Basic - ( 23 Oct 2023 06:37 )    Color: x / Appearance: x / SG: x / pH: x  Gluc: 98 mg/dL / Ketone: x  / Bili: x / Urobili: x   Blood: x / Protein: x / Nitrite: x   Leuk Esterase: x / RBC: x / WBC x   Sq Epi: x / Non Sq Epi: x / Bacteria: x        MEDICATIONS  (STANDING):  apixaban 5 milliGRAM(s) Oral every 12 hours  artificial  tears Solution 1 Drop(s) Both EYES every 12 hours  ascorbic acid 500 milliGRAM(s) Oral daily  atovaquone  Suspension 1500 milliGRAM(s) Oral daily  dextrose 5%. 1000 milliLiter(s) (50 mL/Hr) IV Continuous <Continuous>  dextrose 5%. 1000 milliLiter(s) (100 mL/Hr) IV Continuous <Continuous>  dextrose 50% Injectable 25 Gram(s) IV Push once  dextrose 50% Injectable 25 Gram(s) IV Push once  dextrose 50% Injectable 12.5 Gram(s) IV Push once  folic acid 1 milliGRAM(s) Oral daily  gabapentin 200 milliGRAM(s) Oral at bedtime  glucagon  Injectable 1 milliGRAM(s) IntraMuscular once  hydrocortisone hemorrhoidal Suppository 1 Suppository(s) Rectal two times a day  influenza   Vaccine 0.5 milliLiter(s) IntraMuscular once  lactobacillus acidophilus 1 Tablet(s) Oral two times a day with meals  melatonin 6 milliGRAM(s) Oral at bedtime  metoprolol tartrate 25 milliGRAM(s) Oral two times a day  multivitamin 1 Tablet(s) Oral daily  nystatin Powder 1 Application(s) Topical two times a day  pantoprazole    Tablet 40 milliGRAM(s) Oral before breakfast  potassium chloride    Tablet ER 40 milliEquivalent(s) Oral every 4 hours  psyllium Powder 1 Packet(s) Oral daily  senna 2 Tablet(s) Oral at bedtime  sodium chloride 0.9% lock flush 5 milliLiter(s) IV Push two times a day  zinc oxide 40% Paste 1 Application(s) Topical three times a day    MEDICATIONS  (PRN):  albuterol/ipratropium for Nebulization 3 milliLiter(s) Nebulizer every 6 hours PRN Shortness of Breath and/or Wheezing  aluminum hydroxide/magnesium hydroxide/simethicone Suspension 30 milliLiter(s) Oral every 4 hours PRN Dyspepsia  dextrose Oral Gel 15 Gram(s) Oral once PRN Blood Glucose LESS THAN 70 milliGRAM(s)/deciliter  loperamide 2 milliGRAM(s) Oral three times a day PRN Diarrhea  polyethylene glycol 3350 17 Gram(s) Oral daily PRN Constipation  SIMETHICONE SOFTGELS 250 milliGRAM(s) 250 milliGRAM(s) Oral three times a day PRN for gas  sodium chloride 0.65% Nasal 1 Spray(s) Both Nostrils every 8 hours PRN Nasal Congestion CC: Non-specific Interstitial Lung Disease     Today's Subjective & Objective Findings:  Patient seen and examined at bedside this AM.  Report no further episodes  of loose BM  She requests clerance to sit in a reclincer chair at her bed side  Partner stated that she had been sitting on a similar chair in acute are prior to acute rehab transfer  Informed that patient will get therapy assessment for suitability and safety before that  She was happy with this   Partner present.  Admits to sleeping well.   Last BM on 10/22, per patient.  Discussed sitting in recliner during daytime.  No other complaints at this time.       ROS --No dyspnea, head ache, chest or abd pain  Alternative bowel movements       Therapy-- engaging, motivated  Engaged in UE muscle strengthening  Will continue to engage for  E stim to upper extremity muscles       Vital Signs Last 24 Hrs  T(C): 37.1 (23 Oct 2023 07:45), Max: 37.1 (23 Oct 2023 07:45)  T(F): 98.7 (23 Oct 2023 07:45), Max: 98.7 (23 Oct 2023 07:45)  HR: 77 (23 Oct 2023 07:45) (66 - 90)  BP: 96/64 (23 Oct 2023 07:45) (96/64 - 124/80)  BP(mean): --  RR: 16 (23 Oct 2023 07:45) (16 - 16)  SpO2: 95% (23 Oct 2023 07:45) (95% - 97%)        PHYSICAL EXAM:  Gen -  Comfortable, sitting on a WC, Oxy sats normal on NC oxy 1L, oxy sats  persistently > 93 %  HEENT - , nostrils are moist, no bleeding, mod icterus  Neck - Supple, No limited ROM, O2 via NC  Pulm - good   Cardiovascular - RRR, S1S2  Chest - good chest expansion,   Abdomen - Soft, non tender, +BS,   Extremities - No Cyanosis, no clubbing, 1+ edema to b/l feet, non pitting,    no calf tenderness, LUE Med line    Neuro-     Cognitive - awake, alert, fully oriented, engaging, speech normal      Motor -                    LEFT    UE - 4/5                    RIGHT UE - 4/5                     LEFT    LE - 2 +-/5 limited by leg edema                    RIGHT LE - 2+-/5  limitted by leg edema,, which is reducing      Sensory - Intact        Reflexes - DTR 1+      Coordination - FTN  impaired  due to weakness   MSK: soreness and weakness  Psychiatric - Mood stable, Affect WNL  Skin: Left lower abdomen ruptured blister 3.0 x 1.0, stage 2 pressure injury to right buttock 4 x1 and left buttock 3x1, stage 2 pressure injury ti right inner thigh 0.2 x 0.2, right groin wound 10.5 x 2.0, Left groin wound 5 x 5,      MMT--Able to contact B/L upper back muscles,   Elbow abduction and adduction, gravity eliminated 2+      LABS:                        9.0    6.42  )-----------( 195      ( 23 Oct 2023 06:37 )             29.3     10-23    138  |  101  |  14  ----------------------------<  98  3.1<L>   |  30  |  0.30<L>    Ca    8.7      23 Oct 2023 06:37    TPro  4.8<L>  /  Alb  2.1<L>  /  TBili  3.0<H>  /  DBili  x   /  AST  34  /  ALT  82<H>  /  AlkPhos  248<H>  10-23      Urinalysis Basic - ( 23 Oct 2023 06:37 )    Color: x / Appearance: x / SG: x / pH: x  Gluc: 98 mg/dL / Ketone: x  / Bili: x / Urobili: x   Blood: x / Protein: x / Nitrite: x   Leuk Esterase: x / RBC: x / WBC x   Sq Epi: x / Non Sq Epi: x / Bacteria: x        MEDICATIONS  (STANDING):  apixaban 5 milliGRAM(s) Oral every 12 hours  artificial  tears Solution 1 Drop(s) Both EYES every 12 hours  ascorbic acid 500 milliGRAM(s) Oral daily  atovaquone  Suspension 1500 milliGRAM(s) Oral daily  dextrose 5%. 1000 milliLiter(s) (50 mL/Hr) IV Continuous <Continuous>  dextrose 5%. 1000 milliLiter(s) (100 mL/Hr) IV Continuous <Continuous>  dextrose 50% Injectable 25 Gram(s) IV Push once  dextrose 50% Injectable 25 Gram(s) IV Push once  dextrose 50% Injectable 12.5 Gram(s) IV Push once  folic acid 1 milliGRAM(s) Oral daily  gabapentin 200 milliGRAM(s) Oral at bedtime  glucagon  Injectable 1 milliGRAM(s) IntraMuscular once  hydrocortisone hemorrhoidal Suppository 1 Suppository(s) Rectal two times a day  influenza   Vaccine 0.5 milliLiter(s) IntraMuscular once  lactobacillus acidophilus 1 Tablet(s) Oral two times a day with meals  melatonin 6 milliGRAM(s) Oral at bedtime  metoprolol tartrate 25 milliGRAM(s) Oral two times a day  multivitamin 1 Tablet(s) Oral daily  nystatin Powder 1 Application(s) Topical two times a day  pantoprazole    Tablet 40 milliGRAM(s) Oral before breakfast  potassium chloride    Tablet ER 40 milliEquivalent(s) Oral every 4 hours  psyllium Powder 1 Packet(s) Oral daily  senna 2 Tablet(s) Oral at bedtime  sodium chloride 0.9% lock flush 5 milliLiter(s) IV Push two times a day  zinc oxide 40% Paste 1 Application(s) Topical three times a day    MEDICATIONS  (PRN):  albuterol/ipratropium for Nebulization 3 milliLiter(s) Nebulizer every 6 hours PRN Shortness of Breath and/or Wheezing  aluminum hydroxide/magnesium hydroxide/simethicone Suspension 30 milliLiter(s) Oral every 4 hours PRN Dyspepsia  dextrose Oral Gel 15 Gram(s) Oral once PRN Blood Glucose LESS THAN 70 milliGRAM(s)/deciliter  loperamide 2 milliGRAM(s) Oral three times a day PRN Diarrhea  polyethylene glycol 3350 17 Gram(s) Oral daily PRN Constipation  SIMETHICONE SOFTGELS 250 milliGRAM(s) 250 milliGRAM(s) Oral three times a day PRN for gas  sodium chloride 0.65% Nasal 1 Spray(s) Both Nostrils every 8 hours PRN Nasal Congestion

## 2023-10-23 NOTE — PROGRESS NOTE ADULT - SUBJECTIVE AND OBJECTIVE BOX
Patient is a 62y old  Female who presents with a chief complaint of Interstitial Lung Disease (23 Oct 2023 12:33)      SUBJECTIVE / OVERNIGHT EVENTS:  Pt seen and examined at bedside in the presence of pt's partner. No acute events overnight.  Pt denies cp, palpitations, sob, abd pain, N/V, fever, chills.    ROS:  All other review of systems negative    Allergies    No Known Allergies    Intolerances        MEDICATIONS  (STANDING):  apixaban 5 milliGRAM(s) Oral every 12 hours  artificial  tears Solution 1 Drop(s) Both EYES every 12 hours  ascorbic acid 500 milliGRAM(s) Oral daily  atovaquone  Suspension 1500 milliGRAM(s) Oral daily  dextrose 5%. 1000 milliLiter(s) (50 mL/Hr) IV Continuous <Continuous>  dextrose 5%. 1000 milliLiter(s) (100 mL/Hr) IV Continuous <Continuous>  dextrose 50% Injectable 25 Gram(s) IV Push once  dextrose 50% Injectable 25 Gram(s) IV Push once  dextrose 50% Injectable 12.5 Gram(s) IV Push once  folic acid 1 milliGRAM(s) Oral daily  gabapentin 200 milliGRAM(s) Oral at bedtime  glucagon  Injectable 1 milliGRAM(s) IntraMuscular once  hydrocortisone hemorrhoidal Suppository 1 Suppository(s) Rectal two times a day  influenza   Vaccine 0.5 milliLiter(s) IntraMuscular once  lactobacillus acidophilus 1 Tablet(s) Oral two times a day with meals  melatonin 6 milliGRAM(s) Oral at bedtime  metoprolol tartrate 25 milliGRAM(s) Oral two times a day  multivitamin 1 Tablet(s) Oral daily  nystatin Powder 1 Application(s) Topical two times a day  pantoprazole    Tablet 40 milliGRAM(s) Oral before breakfast  potassium chloride    Tablet ER 40 milliEquivalent(s) Oral every 4 hours  psyllium Powder 1 Packet(s) Oral daily  senna 2 Tablet(s) Oral at bedtime  sodium chloride 0.9% lock flush 5 milliLiter(s) IV Push two times a day  zinc oxide 40% Paste 1 Application(s) Topical three times a day    MEDICATIONS  (PRN):  albuterol/ipratropium for Nebulization 3 milliLiter(s) Nebulizer every 6 hours PRN Shortness of Breath and/or Wheezing  aluminum hydroxide/magnesium hydroxide/simethicone Suspension 30 milliLiter(s) Oral every 4 hours PRN Dyspepsia  dextrose Oral Gel 15 Gram(s) Oral once PRN Blood Glucose LESS THAN 70 milliGRAM(s)/deciliter  loperamide 2 milliGRAM(s) Oral three times a day PRN Diarrhea  polyethylene glycol 3350 17 Gram(s) Oral daily PRN Constipation  SIMETHICONE SOFTGELS 250 milliGRAM(s) 250 milliGRAM(s) Oral three times a day PRN for gas  sodium chloride 0.65% Nasal 1 Spray(s) Both Nostrils every 8 hours PRN Nasal Congestion      Vital Signs Last 24 Hrs  T(C): 37.1 (23 Oct 2023 07:45), Max: 37.1 (23 Oct 2023 07:45)  T(F): 98.7 (23 Oct 2023 07:45), Max: 98.7 (23 Oct 2023 07:45)  HR: 77 (23 Oct 2023 07:45) (66 - 90)  BP: 96/64 (23 Oct 2023 07:45) (96/64 - 124/80)  BP(mean): --  RR: 16 (23 Oct 2023 07:45) (16 - 16)  SpO2: 95% (23 Oct 2023 07:45) (95% - 97%)    Parameters below as of 23 Oct 2023 07:45  Patient On (Oxygen Delivery Method): nasal cannula  O2 Flow (L/min): 1    CAPILLARY BLOOD GLUCOSE        I&O's Summary      PHYSICAL EXAM:  GENERAL: NAD, obese female   HEAD:  Atraumatic, Normocephalic  NECK: Supple, No JVD  CHEST/LUNG: Clear to auscultation bilaterally; No wheeze, nonlabored breathing  HEART: Regular rate and rhythm; No murmurs, rubs, or gallops  ABDOMEN: Soft, Nontender, Nondistended; Bowel sounds present  EXTREMITIES:  No clubbing, cyanosis  SYCH: calm, appropriate mood      LABS:                        9.0    6.42  )-----------( 195      ( 23 Oct 2023 06:37 )             29.3     10-23    138  |  101  |  14  ----------------------------<  98  3.1<L>   |  30  |  0.30<L>    Ca    8.7      23 Oct 2023 06:37    TPro  4.8<L>  /  Alb  2.1<L>  /  TBili  3.0<H>  /  DBili  x   /  AST  34  /  ALT  82<H>  /  AlkPhos  248<H>  10-23          Urinalysis Basic - ( 23 Oct 2023 06:37 )    Color: x / Appearance: x / SG: x / pH: x  Gluc: 98 mg/dL / Ketone: x  / Bili: x / Urobili: x   Blood: x / Protein: x / Nitrite: x   Leuk Esterase: x / RBC: x / WBC x   Sq Epi: x / Non Sq Epi: x / Bacteria: x        RADIOLOGY & ADDITIONAL TESTS:  Results Reviewed:   Imaging Personally Reviewed:  Electrocardiogram Personally Reviewed:    COORDINATION OF CARE:  Care Discussed with Consultants/Other Providers [Y/N]:  Prior or Outpatient Records Reviewed [Y/N]:

## 2023-10-23 NOTE — PROGRESS NOTE ADULT - SUBJECTIVE AND OBJECTIVE BOX
Follow-up Pulmonary Progress Note  Chief Complaint : Interstitial lung disease      patient seen and examined  comfortable  discussed results ofcovid ab test showing ab to neuclocabspid and spike protein.   Allergies :No Known Allergies      PAST MEDICAL & SURGICAL HISTORY:  Afib    Sciatica        Medications:  MEDICATIONS  (STANDING):  apixaban 5 milliGRAM(s) Oral every 12 hours  artificial  tears Solution 1 Drop(s) Both EYES every 12 hours  ascorbic acid 500 milliGRAM(s) Oral daily  atovaquone  Suspension 1500 milliGRAM(s) Oral daily  dextrose 5%. 1000 milliLiter(s) (50 mL/Hr) IV Continuous <Continuous>  dextrose 5%. 1000 milliLiter(s) (100 mL/Hr) IV Continuous <Continuous>  dextrose 50% Injectable 25 Gram(s) IV Push once  dextrose 50% Injectable 25 Gram(s) IV Push once  dextrose 50% Injectable 12.5 Gram(s) IV Push once  folic acid 1 milliGRAM(s) Oral daily  gabapentin 200 milliGRAM(s) Oral at bedtime  glucagon  Injectable 1 milliGRAM(s) IntraMuscular once  hydrocortisone hemorrhoidal Suppository 1 Suppository(s) Rectal two times a day  influenza   Vaccine 0.5 milliLiter(s) IntraMuscular once  lactobacillus acidophilus 1 Tablet(s) Oral two times a day with meals  melatonin 6 milliGRAM(s) Oral at bedtime  metoprolol tartrate 25 milliGRAM(s) Oral two times a day  multivitamin 1 Tablet(s) Oral daily  nystatin Powder 1 Application(s) Topical two times a day  pantoprazole    Tablet 40 milliGRAM(s) Oral before breakfast  potassium chloride    Tablet ER 40 milliEquivalent(s) Oral every 4 hours  psyllium Powder 1 Packet(s) Oral daily  senna 2 Tablet(s) Oral at bedtime  sodium chloride 0.9% lock flush 5 milliLiter(s) IV Push two times a day  zinc oxide 40% Paste 1 Application(s) Topical three times a day    MEDICATIONS  (PRN):  albuterol/ipratropium for Nebulization 3 milliLiter(s) Nebulizer every 6 hours PRN Shortness of Breath and/or Wheezing  aluminum hydroxide/magnesium hydroxide/simethicone Suspension 30 milliLiter(s) Oral every 4 hours PRN Dyspepsia  dextrose Oral Gel 15 Gram(s) Oral once PRN Blood Glucose LESS THAN 70 milliGRAM(s)/deciliter  loperamide 2 milliGRAM(s) Oral three times a day PRN Diarrhea  polyethylene glycol 3350 17 Gram(s) Oral daily PRN Constipation  SIMETHICONE SOFTGELS 250 milliGRAM(s) 250 milliGRAM(s) Oral three times a day PRN for gas  sodium chloride 0.65% Nasal 1 Spray(s) Both Nostrils every 8 hours PRN Nasal Congestion      Antibiotics History  atovaquone  Suspension 1500 milliGRAM(s) Oral daily, 10-06-23 @ 00:00      Heme Medications   apixaban 5 milliGRAM(s) Oral every 12 hours, 10-11-23 @ 09:36      GI Medications  aluminum hydroxide/magnesium hydroxide/simethicone Suspension 30 milliLiter(s) Oral every 4 hours, 10-05-23 @ 18:55, Routine PRN  loperamide 2 milliGRAM(s) Oral three times a day, 10-12-23 @ 08:44, Routine PRN  pantoprazole    Tablet 40 milliGRAM(s) Oral before breakfast, 10-06-23 @ 00:00, Routine  polyethylene glycol 3350 17 Gram(s) Oral daily, 10-16-23 @ 07:02, Routine PRN  psyllium Powder 1 Packet(s) Oral daily, 10-12-23 @ 08:45, Routine  senna 2 Tablet(s) Oral at bedtime, 10-15-23 @ 11:54, Routine        LABS:                        9.0    6.42  )-----------( 195      ( 23 Oct 2023 06:37 )             29.3     10-23    138  |  101  |  14  ----------------------------<  98  3.1<L>   |  30  |  0.30<L>    Ca    8.7      23 Oct 2023 06:37    TPro  4.8<L>  /  Alb  2.1<L>  /  TBili  3.0<H>  /  DBili  x   /  AST  34  /  ALT  82<H>  /  AlkPhos  248<H>  10-23           VITALS:  T(C): 37.1 (10-23-23 @ 07:45), Max: 37.1 (10-23-23 @ 07:45)  T(F): 98.7 (10-23-23 @ 07:45), Max: 98.7 (10-23-23 @ 07:45)  HR: 77 (10-23-23 @ 07:45) (66 - 90)  BP: 96/64 (10-23-23 @ 07:45) (96/64 - 124/80)  BP(mean): --  ABP: --  ABP(mean): --  RR: 16 (10-23-23 @ 07:45) (16 - 16)  SpO2: 95% (10-23-23 @ 07:45) (95% - 97%)  CVP(mm Hg): --  CVP(cm H2O): --    [employer]s

## 2023-10-24 PROCEDURE — 99232 SBSQ HOSP IP/OBS MODERATE 35: CPT

## 2023-10-24 PROCEDURE — 90832 PSYTX W PT 30 MINUTES: CPT

## 2023-10-24 RX ADMIN — ZINC OXIDE 1 APPLICATION(S): 200 OINTMENT TOPICAL at 06:25

## 2023-10-24 RX ADMIN — Medication 500 MILLIGRAM(S): at 14:40

## 2023-10-24 RX ADMIN — Medication 1 DROP(S): at 18:24

## 2023-10-24 RX ADMIN — Medication 1 SPRAY(S): at 01:24

## 2023-10-24 RX ADMIN — APIXABAN 5 MILLIGRAM(S): 2.5 TABLET, FILM COATED ORAL at 18:24

## 2023-10-24 RX ADMIN — ZINC OXIDE 1 APPLICATION(S): 200 OINTMENT TOPICAL at 21:14

## 2023-10-24 RX ADMIN — Medication 1 TABLET(S): at 14:41

## 2023-10-24 RX ADMIN — Medication 25 MILLIGRAM(S): at 06:30

## 2023-10-24 RX ADMIN — APIXABAN 5 MILLIGRAM(S): 2.5 TABLET, FILM COATED ORAL at 06:25

## 2023-10-24 RX ADMIN — Medication 1 TABLET(S): at 18:25

## 2023-10-24 RX ADMIN — NYSTATIN CREAM 1 APPLICATION(S): 100000 CREAM TOPICAL at 06:25

## 2023-10-24 RX ADMIN — NYSTATIN CREAM 1 APPLICATION(S): 100000 CREAM TOPICAL at 18:24

## 2023-10-24 RX ADMIN — SODIUM CHLORIDE 5 MILLILITER(S): 9 INJECTION INTRAMUSCULAR; INTRAVENOUS; SUBCUTANEOUS at 17:08

## 2023-10-24 RX ADMIN — ZINC OXIDE 1 APPLICATION(S): 200 OINTMENT TOPICAL at 14:42

## 2023-10-24 RX ADMIN — ATOVAQUONE 1500 MILLIGRAM(S): 750 SUSPENSION ORAL at 14:41

## 2023-10-24 RX ADMIN — GABAPENTIN 200 MILLIGRAM(S): 400 CAPSULE ORAL at 21:14

## 2023-10-24 RX ADMIN — Medication 6 MILLIGRAM(S): at 21:15

## 2023-10-24 RX ADMIN — SODIUM CHLORIDE 5 MILLILITER(S): 9 INJECTION INTRAMUSCULAR; INTRAVENOUS; SUBCUTANEOUS at 06:28

## 2023-10-24 RX ADMIN — Medication 1 MILLIGRAM(S): at 14:41

## 2023-10-24 RX ADMIN — Medication 1 TABLET(S): at 08:15

## 2023-10-24 NOTE — PROGRESS NOTE ADULT - SUBJECTIVE AND OBJECTIVE BOX
Patient seen at bedside for 25-minute, supportive psychotherapy session. Her partner, Kiara, is present per patient preference and participates along with patient.     Patient indicates some progress in ambulation and use of hands, with small movements. She is seated in recliner, which is more comfortable. She indicates a desire to start exploring possible options for next steps (home vs [facility]), so as to be mentally prepared for either option, as it is unclear at this time where she will go.

## 2023-10-24 NOTE — CHART NOTE - NSCHARTNOTEFT_GEN_A_CORE
IDT conference on 10/24  Social Work: Await insurance extension/self pay.   SLP: --  OT: Total A for ADLs/transfers.   PT: [facility] for transfers. Amb 30ft with [employer].   RT: Continues to decline.  DME: TBD   Barriers: R groin wound care.  Functional level on DC: Max A for self care.   TDD: 11/1 to [facility]

## 2023-10-24 NOTE — PROGRESS NOTE ADULT - NS ATTEND AMEND GEN_ALL_CORE FT
Seen and examined   Note revised  Findings as noted      No acute med complaint     Therapy as already noted and he enjoyed same    Tolerating anticoagulation  Making gradual and sustained progress in therapy  LE edema significantly improving with therapy and leg elevation  Aim to wean off oxy    Continue therapy  Explore extension of stay with insurance company

## 2023-10-24 NOTE — PROGRESS NOTE ADULT - SUBJECTIVE AND OBJECTIVE BOX
CC: Non-specific Interstitial Lung Disease     Today's Subjective & Objective Findings:  Patient seen and examined at bedside this AM during therapy with Dr. JACOB  Partner present.  Admits to sleeping well.   Last BM on 10/23, per patient.  Loose BMs have resolved.  No other complaints at this time.     ROS --No dyspnea, head ache, chest or abd pain      Therapy-- engaging, motivated  Engaged in UE muscle strengthening  Will continue to engage for  E stim to upper extremity muscles       Vital Signs Last 24 Hrs  T(C): 37.1 (23 Oct 2023 20:40), Max: 37.1 (23 Oct 2023 20:40)  T(F): 98.7 (23 Oct 2023 20:40), Max: 98.7 (23 Oct 2023 20:40)  HR: 81 (24 Oct 2023 06:15) (80 - 86)  BP: 107/73 (24 Oct 2023 06:15) (105/71 - 116/69)  BP(mean): --  RR: 16 (23 Oct 2023 20:40) (16 - 16)  SpO2: 95% (23 Oct 2023 20:40) (95% - 99%)        PHYSICAL EXAM:  Gen -  Comfortable, sitting on a WC, Oxy sats normal on NC oxy 1L, oxy sats  persistently > 93 %  HEENT - , nostrils are moist, no bleeding, mod icterus  Neck - Supple, No limited ROM, O2 via NC  Pulm - good   Cardiovascular - RRR, S1S2  Chest - good chest expansion,   Abdomen - Soft, non tender, +BS,   Extremities - No Cyanosis, no clubbing, 1+ edema to b/l feet, non pitting,    no calf tenderness, LUE Med line    Neuro-     Cognitive - awake, alert, fully oriented, engaging, speech normal      Motor -                    LEFT    UE - 4/5                    RIGHT UE - 4/5                     LEFT    LE - 2 +-/5 limited by leg edema                    RIGHT LE - 2+-/5  limitted by leg edema,, which is reducing      Sensory - Intact        Reflexes - DTR 1+      Coordination - FTN  impaired  due to weakness   MSK: soreness and weakness  Psychiatric - Mood stable, Affect WNL  Skin: Left lower abdomen ruptured blister 3.0 x 1.0, stage 2 pressure injury to right buttock 4 x1 and left buttock 3x1, stage 2 pressure injury ti right inner thigh 0.2 x 0.2, right groin wound 10.5 x 2.0, Left groin wound 5 x 5,      MMT--Able to contact B/L upper back muscles,   Elbow abduction and adduction, gravity eliminated 2+      LABS:                        9.0    6.42  )-----------( 195      ( 23 Oct 2023 06:37 )             29.3     10-23    138  |  101  |  14  ----------------------------<  98  3.1<L>   |  30  |  0.30<L>    Ca    8.7      23 Oct 2023 06:37    TPro  4.8<L>  /  Alb  2.1<L>  /  TBili  3.0<H>  /  DBili  x   /  AST  34  /  ALT  82<H>  /  AlkPhos  248<H>  10-23      Urinalysis Basic - ( 23 Oct 2023 06:37 )    Color: x / Appearance: x / SG: x / pH: x  Gluc: 98 mg/dL / Ketone: x  / Bili: x / Urobili: x   Blood: x / Protein: x / Nitrite: x   Leuk Esterase: x / RBC: x / WBC x   Sq Epi: x / Non Sq Epi: x / Bacteria: x        MEDICATIONS  (STANDING):  apixaban 5 milliGRAM(s) Oral every 12 hours  artificial  tears Solution 1 Drop(s) Both EYES every 12 hours  ascorbic acid 500 milliGRAM(s) Oral daily  atovaquone  Suspension 1500 milliGRAM(s) Oral daily  dextrose 5%. 1000 milliLiter(s) (50 mL/Hr) IV Continuous <Continuous>  dextrose 5%. 1000 milliLiter(s) (100 mL/Hr) IV Continuous <Continuous>  dextrose 50% Injectable 25 Gram(s) IV Push once  dextrose 50% Injectable 25 Gram(s) IV Push once  dextrose 50% Injectable 12.5 Gram(s) IV Push once  folic acid 1 milliGRAM(s) Oral daily  gabapentin 200 milliGRAM(s) Oral at bedtime  glucagon  Injectable 1 milliGRAM(s) IntraMuscular once  hydrocortisone hemorrhoidal Suppository 1 Suppository(s) Rectal two times a day  influenza   Vaccine 0.5 milliLiter(s) IntraMuscular once  lactobacillus acidophilus 1 Tablet(s) Oral two times a day with meals  melatonin 6 milliGRAM(s) Oral at bedtime  metoprolol tartrate 25 milliGRAM(s) Oral two times a day  multivitamin 1 Tablet(s) Oral daily  nystatin Powder 1 Application(s) Topical two times a day  pantoprazole    Tablet 40 milliGRAM(s) Oral before breakfast  potassium chloride    Tablet ER 40 milliEquivalent(s) Oral every 4 hours  psyllium Powder 1 Packet(s) Oral daily  senna 2 Tablet(s) Oral at bedtime  sodium chloride 0.9% lock flush 5 milliLiter(s) IV Push two times a day  zinc oxide 40% Paste 1 Application(s) Topical three times a day    MEDICATIONS  (PRN):  albuterol/ipratropium for Nebulization 3 milliLiter(s) Nebulizer every 6 hours PRN Shortness of Breath and/or Wheezing  aluminum hydroxide/magnesium hydroxide/simethicone Suspension 30 milliLiter(s) Oral every 4 hours PRN Dyspepsia  dextrose Oral Gel 15 Gram(s) Oral once PRN Blood Glucose LESS THAN 70 milliGRAM(s)/deciliter  loperamide 2 milliGRAM(s) Oral three times a day PRN Diarrhea  polyethylene glycol 3350 17 Gram(s) Oral daily PRN Constipation  SIMETHICONE SOFTGELS 250 milliGRAM(s) 250 milliGRAM(s) Oral three times a day PRN for gas  sodium chloride 0.65% Nasal 1 Spray(s) Both Nostrils every 8 hours PRN Nasal Congestion CC: Non-specific Interstitial Lung Disease     Today's Subjective & Objective Findings:  Patient seen and examined at bedside this AM during therapy with Dr. López  Partner present.  Admits to sleeping well.   Last BM on 10/23, per patient.  Loose BMs have resolved.  No other complaints at this time.     ROS --No dyspnea, head ache, chest or abd pain      Therapy-- engaging, motivated  Had some standing exercise today, Damaris lift, 3 person, max assist  Felt pressure on b/l groin, hence advised therapists to add b/l groin padding during subsequent stand up exercises or anytime straps are being applied to groin  Engaged in UE muscle strengthening  Will continue to engage for  E stim to upper extremity muscles       Vital Signs Last 24 Hrs  T(C): 37.1 (23 Oct 2023 20:40), Max: 37.1 (23 Oct 2023 20:40)  T(F): 98.7 (23 Oct 2023 20:40), Max: 98.7 (23 Oct 2023 20:40)  HR: 81 (24 Oct 2023 06:15) (80 - 86)  BP: 107/73 (24 Oct 2023 06:15) (105/71 - 116/69)  RR: 16 (23 Oct 2023 20:40) (16 - 16)  SpO2: 95% (23 Oct 2023 20:40) (95% - 99%)        PHYSICAL EXAM:  Gen -  Comfortable,   Sitting on a recliner chair at bed side,  HEENT - , nostrils are moist, no bleeding, mod icterus  Neck - Supple, No limited ROM, O2 via NC  Pulm - good   Cardiovascular - RRR, S1S2  Chest - good chest expansion,   Abdomen - Soft, non tender, +BS,   Extremities - No Cyanosis, no clubbing, 1+ edema to b/l feet, non pitting,    no calf tenderness, LUE Med line    Neuro-     Cognitive - awake, alert, fully oriented, engaging, speech normal      Motor -                    LEFT    UE - 4/5                    RIGHT UE - 4/5                     LEFT    LE - 2 +-/5 limited by leg edema                    RIGHT LE - 2+-/5  limitted by leg edema,, which is reducing      Sensory - Intact        Reflexes - DTR 1+      Coordination - FTN  impaired  due to weakness   MSK: soreness and weakness  Psychiatric - Mood stable, Affect WNL  Skin: Left lower abdomen ruptured blister 3.0 x 1.0, stage 2 pressure injury to right buttock 4 x1 and left buttock 3x1, stage 2 pressure injury ti right inner thigh 0.2 x 0.2, right groin wound 10.5 x 2.0, Left groin wound 5 x 5,      MMT--Able to contact B/L upper back muscles,   Elbow abduction and adduction, gravity eliminated 2+      LABS:                        9.0    6.42  )-----------( 195      ( 23 Oct 2023 06:37 )             29.3     10-23    138  |  101  |  14  ----------------------------<  98  3.1<L>   |  30  |  0.30<L>    Ca    8.7      23 Oct 2023 06:37    TPro  4.8<L>  /  Alb  2.1<L>  /  TBili  3.0<H>  /  DBili  x   /  AST  34  /  ALT  82<H>  /  AlkPhos  248<H>  10-23      Urinalysis Basic - ( 23 Oct 2023 06:37 )    Color: x / Appearance: x / SG: x / pH: x  Gluc: 98 mg/dL / Ketone: x  / Bili: x / Urobili: x   Blood: x / Protein: x / Nitrite: x   Leuk Esterase: x / RBC: x / WBC x   Sq Epi: x / Non Sq Epi: x / Bacteria: x        MEDICATIONS  (STANDING):  apixaban 5 milliGRAM(s) Oral every 12 hours  artificial  tears Solution 1 Drop(s) Both EYES every 12 hours  ascorbic acid 500 milliGRAM(s) Oral daily  atovaquone  Suspension 1500 milliGRAM(s) Oral daily  dextrose 5%. 1000 milliLiter(s) (50 mL/Hr) IV Continuous <Continuous>  dextrose 5%. 1000 milliLiter(s) (100 mL/Hr) IV Continuous <Continuous>  dextrose 50% Injectable 25 Gram(s) IV Push once  dextrose 50% Injectable 25 Gram(s) IV Push once  dextrose 50% Injectable 12.5 Gram(s) IV Push once  folic acid 1 milliGRAM(s) Oral daily  gabapentin 200 milliGRAM(s) Oral at bedtime  glucagon  Injectable 1 milliGRAM(s) IntraMuscular once  hydrocortisone hemorrhoidal Suppository 1 Suppository(s) Rectal two times a day  influenza   Vaccine 0.5 milliLiter(s) IntraMuscular once  lactobacillus acidophilus 1 Tablet(s) Oral two times a day with meals  melatonin 6 milliGRAM(s) Oral at bedtime  metoprolol tartrate 25 milliGRAM(s) Oral two times a day  multivitamin 1 Tablet(s) Oral daily  nystatin Powder 1 Application(s) Topical two times a day  pantoprazole    Tablet 40 milliGRAM(s) Oral before breakfast  potassium chloride    Tablet ER 40 milliEquivalent(s) Oral every 4 hours  psyllium Powder 1 Packet(s) Oral daily  senna 2 Tablet(s) Oral at bedtime  sodium chloride 0.9% lock flush 5 milliLiter(s) IV Push two times a day  zinc oxide 40% Paste 1 Application(s) Topical three times a day    MEDICATIONS  (PRN):  albuterol/ipratropium for Nebulization 3 milliLiter(s) Nebulizer every 6 hours PRN Shortness of Breath and/or Wheezing  aluminum hydroxide/magnesium hydroxide/simethicone Suspension 30 milliLiter(s) Oral every 4 hours PRN Dyspepsia  dextrose Oral Gel 15 Gram(s) Oral once PRN Blood Glucose LESS THAN 70 milliGRAM(s)/deciliter  loperamide 2 milliGRAM(s) Oral three times a day PRN Diarrhea  polyethylene glycol 3350 17 Gram(s) Oral daily PRN Constipation  SIMETHICONE SOFTGELS 250 milliGRAM(s) 250 milliGRAM(s) Oral three times a day PRN for gas  sodium chloride 0.65% Nasal 1 Spray(s) Both Nostrils every 8 hours PRN Nasal Congestion    < from: Xray Abdomen 1 View PORTABLE -Routine (Xray Abdomen 1 View PORTABLE -Routine .) (10.19.23 @ 09:04) >  COMPARISON: 9/19/2023    Frontal view of the abdomen shows a normal gas pattern. Moderate stool   burden is present. No definite free air is identified.    IMPRESSION:  No evidence of bowel obstruction.

## 2023-10-24 NOTE — PROGRESS NOTE ADULT - ASSESSMENT
Patient is in her bed. She is alert and oriented. Mood/affect normal. She is apologetic for irritable speech in prior session. She indicates working with medical team to address bowel concerns. Themes discussed include patient's adaptive coping with stress and anticipatory planning.       Biopsychosocial approach used to address various concerns regarding physical and emotional well-being. Support and encouragement are provided.     Plan: Individual psychotherapy to address adjustment. Coordination of care with PM&R. Neuropsychology remains available through discharge.

## 2023-10-25 ENCOUNTER — TRANSCRIPTION ENCOUNTER (OUTPATIENT)
Age: 63
End: 2023-10-25

## 2023-10-25 PROCEDURE — 99232 SBSQ HOSP IP/OBS MODERATE 35: CPT

## 2023-10-25 RX ORDER — FOLIC ACID 0.8 MG
1 TABLET ORAL
Qty: 0 | Refills: 0 | DISCHARGE
Start: 2023-10-25

## 2023-10-25 RX ORDER — LANOLIN ALCOHOL/MO/W.PET/CERES
2 CREAM (GRAM) TOPICAL
Qty: 0 | Refills: 0 | DISCHARGE
Start: 2023-10-25

## 2023-10-25 RX ORDER — SENNA PLUS 8.6 MG/1
2 TABLET ORAL
Qty: 0 | Refills: 0 | DISCHARGE
Start: 2023-10-25

## 2023-10-25 RX ORDER — HYDROCORTISONE 1 %
1 OINTMENT (GRAM) TOPICAL
Qty: 0 | Refills: 0 | DISCHARGE
Start: 2023-10-25

## 2023-10-25 RX ORDER — ZINC OXIDE 200 MG/G
1 OINTMENT TOPICAL
Qty: 0 | Refills: 0 | DISCHARGE
Start: 2023-10-25

## 2023-10-25 RX ORDER — METOPROLOL TARTRATE 50 MG
1 TABLET ORAL
Qty: 0 | Refills: 0 | DISCHARGE
Start: 2023-10-25

## 2023-10-25 RX ORDER — IPRATROPIUM/ALBUTEROL SULFATE 18-103MCG
3 AEROSOL WITH ADAPTER (GRAM) INHALATION
Qty: 0 | Refills: 0 | DISCHARGE
Start: 2023-10-25

## 2023-10-25 RX ORDER — GABAPENTIN 400 MG/1
2 CAPSULE ORAL
Qty: 0 | Refills: 0 | DISCHARGE
Start: 2023-10-25

## 2023-10-25 RX ORDER — LOPERAMIDE HCL 2 MG
1 TABLET ORAL
Qty: 0 | Refills: 0 | DISCHARGE
Start: 2023-10-25

## 2023-10-25 RX ORDER — ASCORBIC ACID 60 MG
1 TABLET,CHEWABLE ORAL
Qty: 0 | Refills: 0 | DISCHARGE
Start: 2023-10-25

## 2023-10-25 RX ORDER — PSYLLIUM SEED (WITH DEXTROSE)
1 POWDER (GRAM) ORAL
Qty: 0 | Refills: 0 | DISCHARGE
Start: 2023-10-25

## 2023-10-25 RX ORDER — NYSTATIN CREAM 100000 [USP'U]/G
1 CREAM TOPICAL
Qty: 0 | Refills: 0 | DISCHARGE
Start: 2023-10-25

## 2023-10-25 RX ORDER — SODIUM CHLORIDE 0.65 %
1 AEROSOL, SPRAY (ML) NASAL
Qty: 0 | Refills: 0 | DISCHARGE
Start: 2023-10-25

## 2023-10-25 RX ORDER — ATOVAQUONE 750 MG/5ML
10 SUSPENSION ORAL
Qty: 0 | Refills: 0 | DISCHARGE
Start: 2023-10-25

## 2023-10-25 RX ORDER — PANTOPRAZOLE SODIUM 20 MG/1
1 TABLET, DELAYED RELEASE ORAL
Qty: 0 | Refills: 0 | DISCHARGE
Start: 2023-10-25

## 2023-10-25 RX ORDER — LACTOBACILLUS ACIDOPHILUS 100MM CELL
1 CAPSULE ORAL
Qty: 0 | Refills: 0 | DISCHARGE
Start: 2023-10-25

## 2023-10-25 RX ORDER — APIXABAN 2.5 MG/1
1 TABLET, FILM COATED ORAL
Qty: 0 | Refills: 0 | DISCHARGE
Start: 2023-10-25

## 2023-10-25 RX ORDER — POLYETHYLENE GLYCOL 3350 17 G/17G
17 POWDER, FOR SOLUTION ORAL
Qty: 0 | Refills: 0 | DISCHARGE
Start: 2023-10-25

## 2023-10-25 RX ADMIN — ZINC OXIDE 1 APPLICATION(S): 200 OINTMENT TOPICAL at 12:24

## 2023-10-25 RX ADMIN — SODIUM CHLORIDE 5 MILLILITER(S): 9 INJECTION INTRAMUSCULAR; INTRAVENOUS; SUBCUTANEOUS at 06:54

## 2023-10-25 RX ADMIN — Medication 25 MILLIGRAM(S): at 06:50

## 2023-10-25 RX ADMIN — Medication 1 TABLET(S): at 12:20

## 2023-10-25 RX ADMIN — Medication 1 DROP(S): at 06:50

## 2023-10-25 RX ADMIN — APIXABAN 5 MILLIGRAM(S): 2.5 TABLET, FILM COATED ORAL at 18:17

## 2023-10-25 RX ADMIN — ZINC OXIDE 1 APPLICATION(S): 200 OINTMENT TOPICAL at 06:49

## 2023-10-25 RX ADMIN — PANTOPRAZOLE SODIUM 40 MILLIGRAM(S): 20 TABLET, DELAYED RELEASE ORAL at 06:50

## 2023-10-25 RX ADMIN — Medication 1 TABLET(S): at 18:17

## 2023-10-25 RX ADMIN — Medication 500 MILLIGRAM(S): at 12:20

## 2023-10-25 RX ADMIN — SODIUM CHLORIDE 5 MILLILITER(S): 9 INJECTION INTRAMUSCULAR; INTRAVENOUS; SUBCUTANEOUS at 19:38

## 2023-10-25 RX ADMIN — NYSTATIN CREAM 1 APPLICATION(S): 100000 CREAM TOPICAL at 18:17

## 2023-10-25 RX ADMIN — ATOVAQUONE 1500 MILLIGRAM(S): 750 SUSPENSION ORAL at 18:17

## 2023-10-25 RX ADMIN — Medication 1 MILLIGRAM(S): at 12:20

## 2023-10-25 RX ADMIN — APIXABAN 5 MILLIGRAM(S): 2.5 TABLET, FILM COATED ORAL at 06:50

## 2023-10-25 RX ADMIN — Medication 6 MILLIGRAM(S): at 21:15

## 2023-10-25 RX ADMIN — Medication 1 DROP(S): at 18:22

## 2023-10-25 RX ADMIN — GABAPENTIN 200 MILLIGRAM(S): 400 CAPSULE ORAL at 21:15

## 2023-10-25 RX ADMIN — NYSTATIN CREAM 1 APPLICATION(S): 100000 CREAM TOPICAL at 06:50

## 2023-10-25 RX ADMIN — Medication 25 MILLIGRAM(S): at 18:17

## 2023-10-25 NOTE — DISCHARGE NOTE PROVIDER - PROVIDER TOKENS
PROVIDER:[TOKEN:[[id number]],FOLLOWUP:[1 month]],PROVIDER:[TOKEN:[[id number]],FOLLOWUP:[1 month]],PROVIDER:[TOKEN:[[id number]],FOLLOWUP:[2 weeks]],PROVIDER:[TOKEN:[[id number]],FOLLOWUP:[1 week]],PROVIDER:[TOKEN:[[id number]],FOLLOWUP:[2 weeks]],PROVIDER:[TOKEN:[[id number]],FOLLOWUP:[1 week]] PROVIDER:[TOKEN:[[id number]],FOLLOWUP:[1 month]],PROVIDER:[TOKEN:[[id number]],FOLLOWUP:[1 month]],PROVIDER:[TOKEN:[[id number]],FOLLOWUP:[2 weeks]],PROVIDER:[TOKEN:[[id number]],FOLLOWUP:[1 week]],PROVIDER:[TOKEN:[[id number]],FOLLOWUP:[2 weeks]],PROVIDER:[TOKEN:[[id number]],FOLLOWUP:[1 week]] PROVIDER:[TOKEN:[[id number]],FOLLOWUP:[1 month]],PROVIDER:[TOKEN:[[id number]],FOLLOWUP:[1 month]],PROVIDER:[TOKEN:[[id number]],FOLLOWUP:[2 weeks]],PROVIDER:[TOKEN:[[id number]],FOLLOWUP:[1 week]],PROVIDER:[TOKEN:[[id number]],FOLLOWUP:[2 weeks]],PROVIDER:[TOKEN:[[id number]],FOLLOWUP:[1 week]] PROVIDER:[TOKEN:[[id number]],FOLLOWUP:[1 month]],PROVIDER:[TOKEN:[[id number]],FOLLOWUP:[1 month]],PROVIDER:[TOKEN:[[id number]],FOLLOWUP:[2 weeks]],PROVIDER:[TOKEN:[[id number]],FOLLOWUP:[1 week]],PROVIDER:[TOKEN:[[id number]],FOLLOWUP:[2 weeks]],PROVIDER:[TOKEN:[[id number]],FOLLOWUP:[1 week]],PROVIDER:[TOKEN:[[id number]],FOLLOWUP:[1 week]],PROVIDER:[TOKEN:[[id number]:[id number]],FOLLOWUP:[1 week]],PROVIDER:[TOKEN:[[id number]],FOLLOWUP:[1 week]] PROVIDER:[TOKEN:[[id number]],FOLLOWUP:[1 month]],PROVIDER:[TOKEN:[[id number]],FOLLOWUP:[1 month]],PROVIDER:[TOKEN:[[id number]],FOLLOWUP:[2 weeks]],PROVIDER:[TOKEN:[[id number]],FOLLOWUP:[1 week]],PROVIDER:[TOKEN:[[id number]],FOLLOWUP:[2 weeks]],PROVIDER:[TOKEN:[[id number]],FOLLOWUP:[1 week]],PROVIDER:[TOKEN:[[id number]],FOLLOWUP:[1 week]],PROVIDER:[TOKEN:[[id number]:MIIS:[id number]],FOLLOWUP:[1 week]],PROVIDER:[TOKEN:[[id number]:[id number]],FOLLOWUP:[1 week]] PROVIDER:[TOKEN:[[id number]],FOLLOWUP:[1 month]],PROVIDER:[TOKEN:[[id number]],FOLLOWUP:[1 month]],PROVIDER:[TOKEN:[[id number]],FOLLOWUP:[2 weeks]],PROVIDER:[TOKEN:[[id number]],FOLLOWUP:[1 week]],PROVIDER:[TOKEN:[[id number]],FOLLOWUP:[2 weeks]],PROVIDER:[TOKEN:[[id number]],FOLLOWUP:[1 week]],PROVIDER:[TOKEN:[[id number]],FOLLOWUP:[1 week]],PROVIDER:[TOKEN:[[id number]:MIIS:[id number]],FOLLOWUP:[1 week]],PROVIDER:[TOKEN:[[id number]:MIIS:[id number]],FOLLOWUP:[1 week]] PROVIDER:[TOKEN:[[id number]],FOLLOWUP:[2 weeks]],PROVIDER:[TOKEN:[[id number]],FOLLOWUP:[1 month]],PROVIDER:[TOKEN:[[id number]],FOLLOWUP:[2 weeks]],PROVIDER:[TOKEN:[[id number]],FOLLOWUP:[1 week]],PROVIDER:[TOKEN:[[id number]],FOLLOWUP:[2 weeks]],PROVIDER:[TOKEN:[[id number]],FOLLOWUP:[1 week]],PROVIDER:[TOKEN:[[id number]:[id number]],FOLLOWUP:[1 week]],PROVIDER:[TOKEN:[[id number]:MIIS:[id number]],FOLLOWUP:[1 week]],PROVIDER:[TOKEN:[[id number]:MIIS:[id number]],FOLLOWUP:[1 week]] PROVIDER:[TOKEN:[[id number]],FOLLOWUP:[2 weeks]],PROVIDER:[TOKEN:[[id number]],FOLLOWUP:[1 month]],PROVIDER:[TOKEN:[[id number]],FOLLOWUP:[2 weeks]],PROVIDER:[TOKEN:[[id number]],FOLLOWUP:[1 week]],PROVIDER:[TOKEN:[[id number]],FOLLOWUP:[2 weeks]],PROVIDER:[TOKEN:[[id number]:MIIS:[id number]],FOLLOWUP:[1 week]],PROVIDER:[TOKEN:[[id number]:[id number]],FOLLOWUP:[1 week]],PROVIDER:[TOKEN:[[id number]:MIIS:[id number]],FOLLOWUP:[1 week]],PROVIDER:[TOKEN:[[id number]:MIIS:[id number]],FOLLOWUP:[1 week]] PROVIDER:[TOKEN:[[id number]],FOLLOWUP:[2 weeks]],PROVIDER:[TOKEN:[[id number]],FOLLOWUP:[1 month]],PROVIDER:[TOKEN:[[id number]],FOLLOWUP:[2 weeks]],PROVIDER:[TOKEN:[[id number]],FOLLOWUP:[1 week]],PROVIDER:[TOKEN:[[id number]:[id number]],FOLLOWUP:[2 weeks]],PROVIDER:[TOKEN:[[id number]],FOLLOWUP:[1 week]],PROVIDER:[TOKEN:[[id number]:[id number]],FOLLOWUP:[1 week]],PROVIDER:[TOKEN:[[id number]:MIIS:[id number]],FOLLOWUP:[1 week]],PROVIDER:[TOKEN:[[id number]:MIIS:[id number]],FOLLOWUP:[1 week]]

## 2023-10-25 NOTE — PROGRESS NOTE ADULT - ASSESSMENT
Assessment/Plan:  ALIZA FOLEY is a 62 year old female with PMH of pAFIB and sciatica; who presented to [facility] ED with SOB, and was found to have groundglass opacities and interstitial lung disease. She was treated with empiric Vancomycin and Zosyn. She underwent a bronchoscopy with bronchoalveolar lavage and pulse steroids. She was given IV diuretics for increased pulmonary congestion, but her respiratory status continued to worsen.  On 9/13, she was transferred to [facility] for ECMO requirements. She was further treated with Plasmapheresis, Rituximab and high-dose steroids, with significant improvement. Her overall hospital course was complicated by thrombocytopenia (s/p several platelet transfusions), leukocytosis (infectious workup entirely negative- s/p Vanco and Ceftriaxone), AFIB with RVR (resolved with Metoprolol), dysphagia (requiring NGT), transaminitis (secondary to acute illness and hypotension),  non-occlusive RIJ DVT (started on Lovenox). Patient now admitted for a multidisciplinary rehab program. 10-05-23 @ 13:18      * Rehab team to explore all potential DC options (EMIGDIO, extended stay/self pay?, etc) , SW reminded and will explore this and update patient  * Wound care following  * Pulmonary team following     #Interstitial Lung Disease and Mixed connective tissue disease  - Gait Instability, ADL impairments and Functional impairments: start Comprehensive Rehab Program of PT/OT/SLP - 3 hours a day, 5 days a week  - P&O as needed   - Non-specific Interstitial Lung Disease  - Requiring ECMO   - Improvement s/p Plasmapheresis, Rituximab and high- dose steroids   - Albuterol/Ipratropium Nebulizer every 6 hours  - Mepron 1500mg daily  - PO Medrol ( due to liver dysfunction): Plan will be to taper by ~20% every 2 weeks  Medrol   64mg x 2 weeks  56mg x 2 weeks  44mg x 2 weeks   36mg x 2 weeks - Follow up Outpatient   -- Repeat CT Chest in 6 weeks   --Pulmonary team--f/u with patient and clarify when she can get immunizations    -Perform BL elbow flexion & elbow extension with antigravity in therapy    10/24--SW will apply to insurance for extension in few days and proceed with other options depending on response  F/U to earlier discussion on 1/018    #pAFIB/Rt Ij thrombus  - Metoprolol 25mg BID and lovenox  --- Eliquis 5mg BID - tolerating well     # Chronic Tinnitus   - ENT review and recs appreciate, Patient already made ENT appointment for f/u    # Lymphedema both legs -chronic and Rt IJ thrombus  - ACE Wrap BL LEs  - BL LE doppler negative for DVT  - BL UE doppler with chronic thrombotic changes in RIJ       #Transaminitis --LFT Down trending   - Secondary to acute illness and hypotension at [facility]  - Outpatient follow up     #Neuropathy  --continue gabapentin 10/16, dose increased 10/23 to 200mg qhs  --Work on motor strengthening, priority for UE as preferred by patient     #Sleep  - Melatonin 6mg at HS    #Skin  - Skin: Left lower abdomen ruptured blister 3.0 x 1.0, stage 2 pressure injury to right buttock 4 x1 and left buttock 3x1, stage 2 pressure injury ti right inner thigh 0.2 x 0.2, right groin wound 10.5 x 2.0, Left groin wound 5 x 5,  right neck scab wound  -- MAD to skin folds- Nystatin to submammary and abdominal pannus BID  -- MAD to L groin- cleanse with NS, Triad cream daily  -- Mad to R groin- Nystatin powder daily   -- R groin wound-- aquacel packing, Triad to periwound, Allevyn foam- daily and PRN   - Pressure injury/Skin: OOB to Chair, PT/OT   - Offload pressure, low air loss support surfaces, T&P every 2 hours   --Wound care consult-10/9 -Multiple wounds--perineal and buttock/sacral, recs appreciated   --Suggest Continue treatments as below:   Twice daily & PRN Normal Saline Cleanse of abdominal pannus & breast folds, perineal, Left & Right inner buttock erosions.  Pat thoroughly dry.   Apply Desitin generously twice daily (& PRN) to perineal, buttocks, and left groin erosions, leave open to air.    Apply Nystatin powder to abdominal pannus and breast folds.  Suggest use of Interdry cloths in folds to 'wick' moisture.  Suggest daily Normal Saline Cleanse of right groin surgical wound, pat dry.  Apply Cavillon film barrier to intact periwound skin.  Place Aquacell strip over open area, cover with foam dressing.  - Wound care following         #Pain Mgmt   - Tylenol PRN   - Gabapentin 200mg at HS     #GI/Bowel Mgmt   - Pantoprazole  - Senna  --on hold due to recent Loose BM  - PRN: Maalox   - Stool count  -  Lactobacillus BID   -- AB XR mod stool burden on transverse colon 10/23--still has mod stool burden, but moving bowel appropriately  - S/p enema and lactulose    # Alternating bowel function --commenced probiotic, latobacilus   * Leucocytosis probably steroid related and partly due to her Mixed Connective Tissue Disease, will continue monitoring     #/Bladder Mgmt --voiding     #FEN   #Dysphagia  - Diet - Minced & Moist  [CC]    - Dysphaiga- SLP evaluation     #Health maintenance  - Folic Acid   - MVI  - Ocean nasal spray    # Difficulty with access to peripheral veins-- Blood draws from Left arm Medline     #Precautions / PROPHYLAXIS:   - Falls  - ortho: Weight bearing status: WBAT   - Lungs: Aspiration, Incentive Spirometer   - DVT PPX: Eliquis 5 mg BID   ---------------------------    * 10/23 Labs unremarkable,,stable anemia, LFT elevated, but downtrending, leucocytosis, non progressive    Liaison with family  Patient's partner present during review, details of review d/w her, detailed explanation of therapy protocol explained and she was happy with same  10/9--Partner present during review and discussed details treatment plan with her    Liaison with providers  Consult recs form multiple specialities being implemented  Surgical consult and recs 10/9--agreed with plan for AC for Rt IJ chronic thrombus    10/10 --No response to multiple calls to patient'srivate cardiologist Dr Otto Stratton  ph     ---------------------------  IDT conference on 10/24  Social Work: Await insurance extension/self pay.   SLP: --  OT: Total A for ADLs/transfers.   PT: Damaris for transfers. Amb 30ft with LiteGait.   RT: Continues to decline.  DME: TBD   Barriers: R groin wound care.  Functional level on DC: Max A for self care.   TDD: 11/1 to EMIGDIO.  ---------------------------  OUTPATIENT/FOLLOW UP:    Trae Whitney  Pulmonary Disease  [address], [address] [place] [address]  Phone: [phone]  Fax: [phone]  Follow Up Time: 2 weeks    Ryanne Allen  Rheumatology  [address] [address]  Phone: [phone]  Fax: [phone]  Follow Up Time: 1 week    Kyle Pierce  Internal Medicine  [address] [place] [address], [address]  [place] [address]  Phone: [phone]  Fax: [phone]  Follow Up Time: 2 weeks    Addy Powers  Pulmonary Disease  [address] [facility], [address] [id number]  Phone: [phone]  Fax: [phone]  Follow Up Time:     Kwame Hawley  Critical Care Medicine  [address] [facility], [address] [address]  Phone: [phone]  Fax: [phone]  Follow Up Time:     Neena Borrego  Rheumatology  [address] [facility], [address]  [place] [address]  Phone: [phone]  Fax: [phone]  Follow Up Time:    [facility]  [facility] [address]  Scheduled Appointment: 09/13/2023  2:40 PM  ---------------------------

## 2023-10-25 NOTE — DISCHARGE NOTE PROVIDER - NSDCCPCAREPLAN_GEN_ALL_CORE_FT
PRINCIPAL DISCHARGE DIAGNOSIS  Diagnosis: Lung interstitial disease  Assessment and Plan of Treatment: You presented to the hospital for shortness of breath and were found to have groundglass opacities and interstitial lung disease. You had a complicated course requiring ECMO. You were sent to  acute rehab to help improve your strength and overall function. Please follow up with the following providers listed in this packet for further management. Please repeat a CT Chest upon return to [place].      SECONDARY DISCHARGE DIAGNOSES  Diagnosis: Afib  Assessment and Plan of Treatment: Please continue to take your anticoagulation and your Metoprolol as prescribed. Please follow up with your PCP/Cardiologist for ongoing management.    Diagnosis: Neuropathy  Assessment and Plan of Treatment: Please continue to take Gabapentin as prescribed. Please follow up with your PCP/Rheumatologist for further management.    Diagnosis: On prednisone therapy  Assessment and Plan of Treatment: Please follow up with Rheumatology outpatient for further management of Prednisone taper.

## 2023-10-25 NOTE — DISCHARGE NOTE PROVIDER - NSDCFUSCHEDAPPT_GEN_ALL_CORE_FT
[address]  Scheduled Appointment: 11/27/2023    Addy Powers  [address]  Scheduled Appointment: 11/27/2023    [address]  Scheduled Appointment: 12/06/2023     [address]  Scheduled Appointment: 11/27/2023    Addy Powers  [address]  Scheduled Appointment: 11/27/2023    [address]  Scheduled Appointment: 12/06/2023     [address]  Scheduled Appointment: 11/27/2023    Addy Powers  [address]  Scheduled Appointment: 11/27/2023    [address]  Scheduled Appointment: 12/06/2023     [address]  Scheduled Appointment: 01/17/2024    Addy Powers  [address]  Scheduled Appointment: 01/17/2024     [address]  Scheduled Appointment: 01/17/2024    Addy Powers  [address]  Scheduled Appointment: 01/17/2024     [address]  Scheduled Appointment: 01/17/2024    Addy Powers  [address]  Scheduled Appointment: 01/17/2024

## 2023-10-25 NOTE — DISCHARGE NOTE PROVIDER - NSDCFUADDAPPT_GEN_ALL_CORE_FT
Please follow up with your PCP as soon as possible.    If you are in need of a PCP or a specialist in your area: contact the [facility] Physician Referral Service at [phone].  Please follow up with your PCP as soon as possible.    If you are in need of a PCP or a specialist in your area: contact the [facility] Physician Referral Service at [phone].  Please follow up with your PCP as soon as possible.    If you are in need of a PCP or a specialist in your area: contact the [facility] Physician Referral Service at [phone].

## 2023-10-25 NOTE — DISCHARGE NOTE PROVIDER - HOSPITAL COURSE
HPI:  Zo Hager is a 62 year old female with PMH of pAFIB and sciatica; who presented to [facility] ED with SOB. She has been experiencing chronic SOB and non-productive cough for several months and was worked up in [place] where she had a CT scan which resulted negative.  She has been evaluated by Pulmonology and Rheumatology and was ruled out for lupus and immunological disorders.  She recently went to Urgent Care due to SOB with ambulating short distances (12-15 feet), and an XR was concerning for BL LL infiltrates. While at [facility],  CXR and CTA were significant for ground glass opacities, and interstitial lung disease, respectively. She was treated with empiric Vancomycin and Zosyn. She underwent a bronchoscopy with bronchoalveolar lavage and pulse steroids. She was given IV diuretics for increased pulmonary congestion, but her respiratory status continued to worsen.  On 9/13, she was transferred to [facility] for ECMO requirements. She was further treated with Plasmapheresis, Rituximab and high-dose steroids, with significant improvement.   Her overall hospital course was complicated by thrombocytopenia (s/p several platelet transfusions), leukocytosis (infectious workup entirely negative- s/p Vanco and Ceftriaxone), AFIB with RVR (resolved with Metoprolol), dysphagia (requiring NGT), transaminitis (secondary to acute illness and hypotension),  non-occlusive RIJ DVT (started on Lovenox). PM&R was consulted and deemed her an appropriate candidate for IRF. She was medically stabilized and cleared for discharge to [facility].  (05 Oct 2023 13:13)      Patient was evaluated by PM&R and therapy for gait/ADL impairments and recommended acute rehabilitation. Patient was medically optimized for discharge to [facility] on 10/5/23. Admitted with gait instability, ADL, and functional impairments.     Rehab course significant for:    All other medical co-morbidities were stable. Patient tolerated course of inpatient PT/OT/SLP rehab with significant improvements and met rehab goals prior to discharge. Patient was medically cleared on ___ for discharge to [facility]. HPI:  Zo Hager is a 62 year old female with PMH of pAFIB and sciatica; who presented to [facility] ED with SOB. She has been experiencing chronic SOB and non-productive cough for several months and was worked up in [place] where she had a CT scan which resulted negative.  She has been evaluated by Pulmonology and Rheumatology and was ruled out for lupus and immunological disorders.  She recently went to Urgent Care due to SOB with ambulating short distances (12-15 feet), and an XR was concerning for BL LL infiltrates. While at [facility],  CXR and CTA were significant for ground glass opacities, and interstitial lung disease, respectively. She was treated with empiric Vancomycin and Zosyn. She underwent a bronchoscopy with bronchoalveolar lavage and pulse steroids. She was given IV diuretics for increased pulmonary congestion, but her respiratory status continued to worsen.  On 9/13, she was transferred to [facility] for ECMO requirements. She was further treated with Plasmapheresis, Rituximab and high-dose steroids, with significant improvement.   Her overall hospital course was complicated by thrombocytopenia (s/p several platelet transfusions), leukocytosis (infectious workup entirely negative- s/p Vanco and Ceftriaxone), AFIB with RVR (resolved with Metoprolol), dysphagia (requiring NGT), transaminitis (secondary to acute illness and hypotension),  non-occlusive RIJ DVT (started on Lovenox). PM&R was consulted and deemed her an appropriate candidate for IRF. She was medically stabilized and cleared for discharge to [facility].  (05 Oct 2023 13:13)      Patient was evaluated by PM&R and therapy for gait/ADL impairments and recommended acute rehabilitation. Patient was medically optimized for discharge to [facility] on 10/5/23. Admitted with gait instability, ADL, and functional impairments.     Rehab course significant for:    All other medical co-morbidities were stable. Patient tolerated course of inpatient PT/OT/SLP rehab with significant improvements and met rehab goals prior to discharge. Patient was medically cleared on ___ for discharge to [facility]. HPI:  Zo Hager is a 62 year old female with PMH of pAFIB and sciatica; who presented to [facility] ED with SOB. She has been experiencing chronic SOB and non-productive cough for several months and was worked up in [place] where she had a CT scan which resulted negative.  She has been evaluated by Pulmonology and Rheumatology and was ruled out for lupus and immunological disorders.  She recently went to Urgent Care due to SOB with ambulating short distances (12-15 feet), and an XR was concerning for BL LL infiltrates. While at [facility],  CXR and CTA were significant for ground glass opacities, and interstitial lung disease, respectively. She was treated with empiric Vancomycin and Zosyn. She underwent a bronchoscopy with bronchoalveolar lavage and pulse steroids. She was given IV diuretics for increased pulmonary congestion, but her respiratory status continued to worsen.  On 9/13, she was transferred to [facility] for ECMO requirements. She was further treated with Plasmapheresis, Rituximab and high-dose steroids, with significant improvement.   Her overall hospital course was complicated by thrombocytopenia (s/p several platelet transfusions), leukocytosis (infectious workup entirely negative- s/p Vanco and Ceftriaxone), AFIB with RVR (resolved with Metoprolol), dysphagia (requiring NGT), transaminitis (secondary to acute illness and hypotension),  non-occlusive RIJ DVT (started on Lovenox). PM&R was consulted and deemed her an appropriate candidate for IRF. She was medically stabilized and cleared for discharge to [facility].  (05 Oct 2023 13:13)      Patient was evaluated by PM&R and therapy for gait/ADL impairments and recommended acute rehabilitation. Patient was medically optimized for discharge to [facility] on 10/5/23. Admitted with gait instability, ADL, and functional impairments.     Rehab course significant for:    All other medical co-morbidities were stable. Patient tolerated course of inpatient PT/OT/SLP rehab with significant improvements and met rehab goals prior to discharge. Patient was medically cleared on ___ for discharge to [facility]. HPI:  Zo Hager is a 62 year old female with PMH of pAFIB and sciatica; who presented to [facility] ED with SOB. She has been experiencing chronic SOB and non-productive cough for several months and was worked up in [place] where she had a CT scan which resulted negative.  She has been evaluated by Pulmonology and Rheumatology and was ruled out for lupus and immunological disorders.  She recently went to Urgent Care due to SOB with ambulating short distances (12-15 feet), and an XR was concerning for BL LL infiltrates. While at [facility],  CXR and CTA were significant for ground glass opacities, and interstitial lung disease, respectively. She was treated with empiric Vancomycin and Zosyn. She underwent a bronchoscopy with bronchoalveolar lavage and pulse steroids. She was given IV diuretics for increased pulmonary congestion, but her respiratory status continued to worsen.  On 9/13, she was transferred to [facility] for ECMO requirements. She was further treated with Plasmapheresis, Rituximab and high-dose steroids, with significant improvement.   Her overall hospital course was complicated by thrombocytopenia (s/p several platelet transfusions), leukocytosis (infectious workup entirely negative- s/p Vanco and Ceftriaxone), AFIB with RVR (resolved with Metoprolol), dysphagia (requiring NGT), transaminitis (secondary to acute illness and hypotension),  non-occlusive RIJ DVT (started on Lovenox). PM&R was consulted and deemed her an appropriate candidate for IRF. She was medically stabilized and cleared for discharge to [facility].  (05 Oct 2023 13:13)      Patient was evaluated by PM&R and therapy for gait/ADL impairments and recommended acute rehabilitation. Patient was medically optimized for discharge to [facility] on 10/5/23. Admitted with gait instability, ADL, and functional impairments.     Rehab course significant for:  10/6: Await consults- ENT, Pulm, Cards, wound care. Await BL LE doppler, BL UE doppler and BL LE arterial doppler.   -- BL LE doppler negative  10/10: Senna. Shower. DC ISS.   10/11: Lovenox DCd. Eliquis 5 mg BID. Gradual BL calf stretching in therapy.   10/12: Imodium, Psyllium, stool count.   10/16: Add senna at HS and miralax PRN. BL UE stretching and strengthening.   10/18: Ab XR. Antigravity BL EF & EE in therapy.   10/19: Lactobacillus   10/20: Ab XR with moderate stool burden. Enema. Lactulose BID x2 doses   10/23: Ab XR. Edema glove in OT. Jeison 200mg at HS.   10/26: Gabapentin to 300mg at HS. Resume steroid taper  10/27: --   10/30: Change meds to 8AM. Do not disturb 10PM- 8 AM  10/31: Jeison 300mg BID. Check B12 level   11/2: Plan for CT Chest on 11/11, per pulm   11/3: Urinary frequency- UA resulted negative.   11/20: needs ambulatory O2 sat on RA and 2L if O2 sat 88% or less. needs nocturnal O2 sat on RA documented. needs sleep study post-DC. possible JACEY  11/21: Condense medication timing   11/22: Ab XR to rule out Ileus  11/28: Midine removed.  11/29: Rheumatology consult for R mandible and posterior occipital numbness with associated hair loss.  11/30: Assisted fall from knee buckling. Lidocaine patch to back and Ibuprofen 400mg x1.  12/1: BL deltoid massage/heat. Lidocaine patch to BL back and L deltoid. Baclofen 5mg TID   12/4: Urinary Frequency- order UA- moderate leuks. Await UCx, started on Bactrim BID x3 days   12/5: Fever overnight. Await blood cultures, lactate and CBC. Await UCx   12/6: CTAP stable, RVP negative. CBC, repeat Lactate. Agreeable to IV ABX. Wants to hold off on IVF. Prelim blood cultures negative.   12/7: Received 1000mL bolus overnight. Continue IV ABX. Await urine sensitivities. Mouthwash for cankre sores. Zosyn switched to Levofloxacin per sensitivities. CXR negative.   12/14: CBC/CMP/Lac/ESR/CRP   12/18: UA negative.       All other medical co-morbidities were stable. Patient tolerated course of inpatient PT/OT/SLP rehab with significant improvements and met rehab goals prior to discharge. Patient was medically cleared on ___ for discharge to [facility]. HPI:  Zo Hager is a 62 year old female with PMH of pAFIB and sciatica; who presented to [facility] ED with SOB. She has been experiencing chronic SOB and non-productive cough for several months and was worked up in [place] where she had a CT scan which resulted negative.  She has been evaluated by Pulmonology and Rheumatology and was ruled out for lupus and immunological disorders.  She recently went to Urgent Care due to SOB with ambulating short distances (12-15 feet), and an XR was concerning for BL LL infiltrates. While at [facility],  CXR and CTA were significant for ground glass opacities, and interstitial lung disease, respectively. She was treated with empiric Vancomycin and Zosyn. She underwent a bronchoscopy with bronchoalveolar lavage and pulse steroids. She was given IV diuretics for increased pulmonary congestion, but her respiratory status continued to worsen.  On 9/13, she was transferred to [facility] for ECMO requirements. She was further treated with Plasmapheresis, Rituximab and high-dose steroids, with significant improvement.   Her overall hospital course was complicated by thrombocytopenia (s/p several platelet transfusions), leukocytosis (infectious workup entirely negative- s/p Vanco and Ceftriaxone), AFIB with RVR (resolved with Metoprolol), dysphagia (requiring NGT), transaminitis (secondary to acute illness and hypotension),  non-occlusive RIJ DVT (started on Lovenox). PM&R was consulted and deemed her an appropriate candidate for IRF. She was medically stabilized and cleared for discharge to [facility].  (05 Oct 2023 13:13)      Patient was evaluated by PM&R and therapy for gait/ADL impairments and recommended acute rehabilitation. Patient was medically optimized for discharge to [facility] on 10/5/23. Admitted with gait instability, ADL, and functional impairments.     Rehab course significant for:  10/6: Await consults- ENT, Pulm, Cards, wound care. Await BL LE doppler, BL UE doppler and BL LE arterial doppler.   -- BL LE doppler negative  10/10: Senna. Shower. DC ISS.   10/11: Lovenox DCd. Eliquis 5 mg BID. Gradual BL calf stretching in therapy.   10/12: Imodium, Psyllium, stool count.   10/16: Add senna at HS and miralax PRN. BL UE stretching and strengthening.   10/18: Ab XR. Antigravity BL EF & EE in therapy.   10/19: Lactobacillus   10/20: Ab XR with moderate stool burden. Enema. Lactulose BID x2 doses   10/23: Ab XR. Edema glove in OT. Jeison 200mg at HS.   10/26: Gabapentin to 300mg at HS. Resume steroid taper  10/27: --   10/30: Change meds to 8AM. Do not disturb 10PM- 8 AM  10/31: Jeison 300mg BID. Check B12 level   11/2: Plan for CT Chest on 11/11, per pulm   11/3: Urinary frequency- UA resulted negative.   11/20: needs ambulatory O2 sat on RA and 2L if O2 sat 88% or less. needs nocturnal O2 sat on RA documented. needs sleep study post-DC. possible JACEY  11/21: Condense medication timing   11/22: Ab XR to rule out Ileus  11/28: Midine removed.  11/29: Rheumatology consult for R mandible and posterior occipital numbness with associated hair loss.  11/30: Assisted fall from knee buckling. Lidocaine patch to back and Ibuprofen 400mg x1.  12/1: BL deltoid massage/heat. Lidocaine patch to BL back and L deltoid. Baclofen 5mg TID   12/4: Urinary Frequency- order UA- moderate leuks. Await UCx, started on Bactrim BID x3 days   12/5: Fever overnight. Await blood cultures, lactate and CBC. Await UCx   12/6: CTAP stable, RVP negative. CBC, repeat Lactate. Agreeable to IV ABX. Wants to hold off on IVF. Prelim blood cultures negative.   12/7: Received 1000mL bolus overnight. Continue IV ABX. Await urine sensitivities. Mouthwash for cankre sores. Zosyn switched to Levofloxacin per sensitivities. CXR negative.   12/14: CBC/CMP/Lac/ESR/CRP   12/18: UA negative.       All other medical co-morbidities were stable. Patient tolerated course of inpatient PT/OT/SLP rehab with significant improvements and met rehab goals prior to discharge. Patient was medically cleared on ___ for discharge to [facility]. HPI:  Zo Hager is a 62 year old female with PMH of pAFIB and sciatica; who presented to [facility] ED with SOB. She has been experiencing chronic SOB and non-productive cough for several months and was worked up in [place] where she had a CT scan which resulted negative.  She has been evaluated by Pulmonology and Rheumatology and was ruled out for lupus and immunological disorders.  She recently went to Urgent Care due to SOB with ambulating short distances (12-15 feet), and an XR was concerning for BL LL infiltrates. While at [facility],  CXR and CTA were significant for ground glass opacities, and interstitial lung disease, respectively. She was treated with empiric Vancomycin and Zosyn. She underwent a bronchoscopy with bronchoalveolar lavage and pulse steroids. She was given IV diuretics for increased pulmonary congestion, but her respiratory status continued to worsen.  On 9/13, she was transferred to [facility] for ECMO requirements. She was further treated with Plasmapheresis, Rituximab and high-dose steroids, with significant improvement.   Her overall hospital course was complicated by thrombocytopenia (s/p several platelet transfusions), leukocytosis (infectious workup entirely negative- s/p Vanco and Ceftriaxone), AFIB with RVR (resolved with Metoprolol), dysphagia (requiring NGT), transaminitis (secondary to acute illness and hypotension),  non-occlusive RIJ DVT (started on Lovenox). PM&R was consulted and deemed her an appropriate candidate for IRF. She was medically stabilized and cleared for discharge to [facility].  (05 Oct 2023 13:13)      Patient was evaluated by PM&R and therapy for gait/ADL impairments and recommended acute rehabilitation. Patient was medically optimized for discharge to [facility] on 10/5/23. Admitted with gait instability, ADL, and functional impairments.     Rehab course significant for:  10/6: Await consults- ENT, Pulm, Cards, wound care. Await BL LE doppler, BL UE doppler and BL LE arterial doppler.   -- BL LE doppler negative  10/10: Senna. Shower. DC ISS.   10/11: Lovenox DCd. Eliquis 5 mg BID. Gradual BL calf stretching in therapy.   10/12: Imodium, Psyllium, stool count.   10/16: Add senna at HS and miralax PRN. BL UE stretching and strengthening.   10/18: Ab XR. Antigravity BL EF & EE in therapy.   10/19: Lactobacillus   10/20: Ab XR with moderate stool burden. Enema. Lactulose BID x2 doses   10/23: Ab XR. Edema glove in OT. Jeison 200mg at HS.   10/26: Gabapentin to 300mg at HS. Resume steroid taper  10/27: --   10/30: Change meds to 8AM. Do not disturb 10PM- 8 AM  10/31: Jeison 300mg BID. Check B12 level   11/2: Plan for CT Chest on 11/11, per pulm   11/3: Urinary frequency- UA resulted negative.   11/20: needs ambulatory O2 sat on RA and 2L if O2 sat 88% or less. needs nocturnal O2 sat on RA documented. needs sleep study post-DC. possible JACEY  11/21: Condense medication timing   11/22: Ab XR to rule out Ileus  11/28: Midine removed.  11/29: Rheumatology consult for R mandible and posterior occipital numbness with associated hair loss.  11/30: Assisted fall from knee buckling. Lidocaine patch to back and Ibuprofen 400mg x1.  12/1: BL deltoid massage/heat. Lidocaine patch to BL back and L deltoid. Baclofen 5mg TID   12/4: Urinary Frequency- order UA- moderate leuks. Await UCx, started on Bactrim BID x3 days   12/5: Fever overnight. Await blood cultures, lactate and CBC. Await UCx   12/6: CTAP stable, RVP negative. CBC, repeat Lactate. Agreeable to IV ABX. Wants to hold off on IVF. Prelim blood cultures negative.   12/7: Received 1000mL bolus overnight. Continue IV ABX. Await urine sensitivities. Mouthwash for cankre sores. Zosyn switched to Levofloxacin per sensitivities. CXR negative.   12/14: CBC/CMP/Lac/ESR/CRP   12/18: UA negative.       All other medical co-morbidities were stable. Patient tolerated course of inpatient PT/OT/SLP rehab with significant improvements and met rehab goals prior to discharge. Patient was medically cleared on ___ for discharge to [facility]. HPI:  Zo Hager is a 62 year old female with PMH of pAFIB and sciatica; who presented to [facility] ED with SOB. She has been experiencing chronic SOB and non-productive cough for several months and was worked up in [place] where she had a CT scan which resulted negative.  She has been evaluated by Pulmonology and Rheumatology and was ruled out for lupus and immunological disorders.  She recently went to Urgent Care due to SOB with ambulating short distances (12-15 feet), and an XR was concerning for BL LL infiltrates. While at [facility],  CXR and CTA were significant for ground glass opacities, and interstitial lung disease, respectively. She was treated with empiric Vancomycin and Zosyn. She underwent a bronchoscopy with bronchoalveolar lavage and pulse steroids. She was given IV diuretics for increased pulmonary congestion, but her respiratory status continued to worsen.  On 9/13, she was transferred to [facility] for ECMO requirements. She was further treated with Plasmapheresis, Rituximab and high-dose steroids, with significant improvement.   Her overall hospital course was complicated by thrombocytopenia (s/p several platelet transfusions), leukocytosis (infectious workup entirely negative- s/p Vanco and Ceftriaxone), AFIB with RVR (resolved with Metoprolol), dysphagia (requiring NGT), transaminitis (secondary to acute illness and hypotension),  non-occlusive RIJ DVT (started on Lovenox). PM&R was consulted and deemed her an appropriate candidate for IRF. She was medically stabilized and cleared for discharge to [facility].  (05 Oct 2023 13:13)      Patient was evaluated by PM&R and therapy for gait/ADL impairments and recommended acute rehabilitation. Patient was medically optimized for discharge to [facility] on 10/5/23. Admitted with gait instability, ADL, and functional impairments.     Rehab course significant for:  10/6: Await consults- ENT, Pulm, Cards, wound care. Await BL LE doppler, BL UE doppler and BL LE arterial doppler.   -- BL LE doppler negative  10/10: Senna. Shower. DC ISS.   10/11: Lovenox DCd. Eliquis 5 mg BID. Gradual BL calf stretching in therapy.   10/12: Imodium, Psyllium, stool count.   10/16: Add senna at HS and miralax PRN. BL UE stretching and strengthening.   10/18: Ab XR. Antigravity BL EF & EE in therapy.   10/19: Lactobacillus   10/20: Ab XR with moderate stool burden. Enema. Lactulose BID x2 doses   10/23: Ab XR. Edema glove in OT. Jeison 200mg at HS.   10/26: Gabapentin to 300mg at HS. Resume steroid taper  10/27: --   10/30: Change meds to 8AM. Do not disturb 10PM- 8 AM  10/31: Jeison 300mg BID. Check B12 level   11/2: Plan for CT Chest on 11/11, per pulm   11/3: Urinary frequency- UA resulted negative.   11/20: needs ambulatory O2 sat on RA and 2L if O2 sat 88% or less. needs nocturnal O2 sat on RA documented. needs sleep study post-DC. possible JACEY  11/21: Condense medication timing   11/22: Ab XR to rule out Ileus  11/28: Midine removed.  11/29: Rheumatology consult for R mandible and posterior occipital numbness with associated hair loss.  11/30: Assisted fall from knee buckling. Lidocaine patch to back and Ibuprofen 400mg x1.  12/1: BL deltoid massage/heat. Lidocaine patch to BL back and L deltoid. Baclofen 5mg TID   12/4: Urinary Frequency- order UA- moderate leuks. Await UCx, started on Bactrim BID x3 days   12/5: Fever overnight. Await blood cultures, lactate and CBC. Await UCx   12/6: CTAP stable, RVP negative. CBC, repeat Lactate. Agreeable to IV ABX. Wants to hold off on IVF. Prelim blood cultures negative.   12/7: Received 1000mL bolus overnight. Continue IV ABX. Await urine sensitivities. Mouthwash for cankre sores. Zosyn switched to Levofloxacin per sensitivities. CXR negative.   12/14: CBC/CMP/Lac/ESR/CRP   12/18: UA negative.       All other medical co-morbidities were stable. Patient tolerated course of inpatient PT/OT/SLP rehab with significant improvements and met rehab goals prior to discharge. Patient was medically cleared on 12/20/23 for discharge to [facility]. HPI:  Zo Hager is a 62 year old female with PMH of pAFIB and sciatica; who presented to [facility] ED with SOB. She has been experiencing chronic SOB and non-productive cough for several months and was worked up in [place] where she had a CT scan which resulted negative.  She has been evaluated by Pulmonology and Rheumatology and was ruled out for lupus and immunological disorders.  She recently went to Urgent Care due to SOB with ambulating short distances (12-15 feet), and an XR was concerning for BL LL infiltrates. While at [facility],  CXR and CTA were significant for ground glass opacities, and interstitial lung disease, respectively. She was treated with empiric Vancomycin and Zosyn. She underwent a bronchoscopy with bronchoalveolar lavage and pulse steroids. She was given IV diuretics for increased pulmonary congestion, but her respiratory status continued to worsen.  On 9/13, she was transferred to [facility] for ECMO requirements. She was further treated with Plasmapheresis, Rituximab and high-dose steroids, with significant improvement.   Her overall hospital course was complicated by thrombocytopenia (s/p several platelet transfusions), leukocytosis (infectious workup entirely negative- s/p Vanco and Ceftriaxone), AFIB with RVR (resolved with Metoprolol), dysphagia (requiring NGT), transaminitis (secondary to acute illness and hypotension),  non-occlusive RIJ DVT (started on Lovenox). PM&R was consulted and deemed her an appropriate candidate for IRF. She was medically stabilized and cleared for discharge to [facility].  (05 Oct 2023 13:13)      Patient was evaluated by PM&R and therapy for gait/ADL impairments and recommended acute rehabilitation. Patient was medically optimized for discharge to [facility] on 10/5/23. Admitted with gait instability, ADL, and functional impairments.     Rehab course significant for:  10/6: Await consults- ENT, Pulm, Cards, wound care. Await BL LE doppler, BL UE doppler and BL LE arterial doppler.   -- BL LE doppler negative  10/10: Senna. Shower. DC ISS.   10/11: Lovenox DCd. Eliquis 5 mg BID. Gradual BL calf stretching in therapy.   10/12: Imodium, Psyllium, stool count.   10/16: Add senna at HS and miralax PRN. BL UE stretching and strengthening.   10/18: Ab XR. Antigravity BL EF & EE in therapy.   10/19: Lactobacillus   10/20: Ab XR with moderate stool burden. Enema. Lactulose BID x2 doses   10/23: Ab XR. Edema glove in OT. Jeison 200mg at HS.   10/26: Gabapentin to 300mg at HS. Resume steroid taper  10/27: --   10/30: Change meds to 8AM. Do not disturb 10PM- 8 AM  10/31: Jeison 300mg BID. Check B12 level   11/2: Plan for CT Chest on 11/11, per pulm   11/3: Urinary frequency- UA resulted negative.   11/20: needs ambulatory O2 sat on RA and 2L if O2 sat 88% or less. needs nocturnal O2 sat on RA documented. needs sleep study post-DC. possible JACEY  11/21: Condense medication timing   11/22: Ab XR to rule out Ileus  11/28: Midine removed.  11/29: Rheumatology consult for R mandible and posterior occipital numbness with associated hair loss.  11/30: Assisted fall from knee buckling. Lidocaine patch to back and Ibuprofen 400mg x1.  12/1: BL deltoid massage/heat. Lidocaine patch to BL back and L deltoid. Baclofen 5mg TID   12/4: Urinary Frequency- order UA- moderate leuks. Await UCx, started on Bactrim BID x3 days   12/5: Fever overnight. Await blood cultures, lactate and CBC. Await UCx   12/6: CTAP stable, RVP negative. CBC, repeat Lactate. Agreeable to IV ABX. Wants to hold off on IVF. Prelim blood cultures negative.   12/7: Received 1000mL bolus overnight. Continue IV ABX. Await urine sensitivities. Mouthwash for cankre sores. Zosyn switched to Levofloxacin per sensitivities. CXR negative.   12/14: CBC/CMP/Lac/ESR/CRP   12/18: UA negative.       All other medical co-morbidities were stable. Patient tolerated course of inpatient PT/OT/SLP rehab with significant improvements and met rehab goals prior to discharge. Patient was medically cleared on 12/20/23 for discharge to [facility]. HPI:  Zo Hager is a 62 year old female with PMH of pAFIB and sciatica; who presented to [facility] ED with SOB. She has been experiencing chronic SOB and non-productive cough for several months and was worked up in [place] where she had a CT scan which resulted negative.  She has been evaluated by Pulmonology and Rheumatology and was ruled out for lupus and immunological disorders.  She recently went to Urgent Care due to SOB with ambulating short distances (12-15 feet), and an XR was concerning for BL LL infiltrates. While at [facility],  CXR and CTA were significant for ground glass opacities, and interstitial lung disease, respectively. She was treated with empiric Vancomycin and Zosyn. She underwent a bronchoscopy with bronchoalveolar lavage and pulse steroids. She was given IV diuretics for increased pulmonary congestion, but her respiratory status continued to worsen.  On 9/13, she was transferred to [facility] for ECMO requirements. She was further treated with Plasmapheresis, Rituximab and high-dose steroids, with significant improvement.   Her overall hospital course was complicated by thrombocytopenia (s/p several platelet transfusions), leukocytosis (infectious workup entirely negative- s/p Vanco and Ceftriaxone), AFIB with RVR (resolved with Metoprolol), dysphagia (requiring NGT), transaminitis (secondary to acute illness and hypotension),  non-occlusive RIJ DVT (started on Lovenox). PM&R was consulted and deemed her an appropriate candidate for IRF. She was medically stabilized and cleared for discharge to [facility].  (05 Oct 2023 13:13)      Patient was evaluated by PM&R and therapy for gait/ADL impairments and recommended acute rehabilitation. Patient was medically optimized for discharge to [facility] on 10/5/23. Admitted with gait instability, ADL, and functional impairments.     Rehab course significant for:  10/6: Await consults- ENT, Pulm, Cards, wound care. Await BL LE doppler, BL UE doppler and BL LE arterial doppler.   -- BL LE doppler negative  10/10: Senna. Shower. DC ISS.   10/11: Lovenox DCd. Eliquis 5 mg BID. Gradual BL calf stretching in therapy.   10/12: Imodium, Psyllium, stool count.   10/16: Add senna at HS and miralax PRN. BL UE stretching and strengthening.   10/18: Ab XR. Antigravity BL EF & EE in therapy.   10/19: Lactobacillus   10/20: Ab XR with moderate stool burden. Enema. Lactulose BID x2 doses   10/23: Ab XR. Edema glove in OT. Jeison 200mg at HS.   10/26: Gabapentin to 300mg at HS. Resume steroid taper  10/27: --   10/30: Change meds to 8AM. Do not disturb 10PM- 8 AM  10/31: Jeison 300mg BID. Check B12 level   11/2: Plan for CT Chest on 11/11, per pulm   11/3: Urinary frequency- UA resulted negative.   11/20: needs ambulatory O2 sat on RA and 2L if O2 sat 88% or less. needs nocturnal O2 sat on RA documented. needs sleep study post-DC. possible JACEY  11/21: Condense medication timing   11/22: Ab XR to rule out Ileus  11/28: Midine removed.  11/29: Rheumatology consult for R mandible and posterior occipital numbness with associated hair loss.  11/30: Assisted fall from knee buckling. Lidocaine patch to back and Ibuprofen 400mg x1.  12/1: BL deltoid massage/heat. Lidocaine patch to BL back and L deltoid. Baclofen 5mg TID   12/4: Urinary Frequency- order UA- moderate leuks. Await UCx, started on Bactrim BID x3 days   12/5: Fever overnight. Await blood cultures, lactate and CBC. Await UCx   12/6: CTAP stable, RVP negative. CBC, repeat Lactate. Agreeable to IV ABX. Wants to hold off on IVF. Prelim blood cultures negative.   12/7: Received 1000mL bolus overnight. Continue IV ABX. Await urine sensitivities. Mouthwash for cankre sores. Zosyn switched to Levofloxacin per sensitivities. CXR negative.   12/14: CBC/CMP/Lac/ESR/CRP   12/18: UA negative.       All other medical co-morbidities were stable. Patient tolerated course of inpatient PT/OT/SLP rehab with significant improvements and met rehab goals prior to discharge. Patient was medically cleared on 12/20/23 for discharge to [facility].

## 2023-10-25 NOTE — DISCHARGE NOTE PROVIDER - NPI NUMBER (FOR SYSADMIN USE ONLY) :
[[id number]],[[id number]],[[id number]],[[id number]],[[id number]] [[id number]],[[id number]],[[id number]],[[id number]],[[id number]],[[id number]] [[id number]],[[id number]],[[id number]],[[id number]],[[id number]],[[id number]] [[id number]],[[id number]],[[id number]],[[id number]],[[id number]],[[id number]],[[id number]],[[id number]],[[id number]] [[id number]],[[id number]],[[id number]],[[id number]],[[id number]],[[id number]],[[id number]],[[id number]],[[id number]] [[id number]],[[id number]],[[id number]],[[id number]],[[id number]],[[id number]],[[id number]],[[id number]],[[id number]]

## 2023-10-25 NOTE — DISCHARGE NOTE PROVIDER - CARE PROVIDERS DIRECT ADDRESSES
,DirectAddress_Unknown,DirectAddress_Unknown,[email or web address],[email or web address] CenterActifioAtrium [email or web address] [email or web address],DirectAddress_Unknown ,DirectAddress_Unknown,DirectAddress_Unknown,[email or web address] PhoenixSongAfterCape Fear [email or web address],DirectAddress_Unknown ,DirectAddress_Unknown,DirectAddress_Unknown,[email or web address] [email or web address] [email or web address] CenterAmerican Red CrossNovant Health Medical Park [facility].net,DirectAddress_Unknown ,DirectAddress_Unknown,DirectAddress_Unknown,[email or web address] CenterColondeeHoly Cross Hospital.[facility],[email or web address],DirectAddress_Unknown,[email or web address],DirectAddress_Unknown,[email or web address] CenterColondeeHoly Cross Hospital.[facility] ,DirectAddress_Unknown,DirectAddress_Unknown,[email or web address] Medical CenterCord ProjectSanta Fe Indian Hospital.[facility],[email or web address],[email or web address],DirectAddress_Unknown,[email or web address],DirectAddress_Unknown,[email or web address] Medical CenterCord ProjectSanta Fe Indian Hospital.[facility] ,DirectAddress_Unknown,DirectAddress_Unknown,[email or web address] CenterClub [facility],[email or web address],[email or web address],DirectAddress_Unknown,[email or web address],DirectAddress_Unknown,[email or web address] Medical CenterClub [facility]

## 2023-10-25 NOTE — DISCHARGE NOTE PROVIDER - NSDCMRMEDTOKEN_GEN_ALL_CORE_FT
aluminum hydroxide-magnesium hydroxide 200 mg-200 mg/5 mL oral suspension: 30 milliliter(s) orally every 4 hours As needed Dyspepsia  apixaban 5 mg oral tablet: 1 tab(s) orally every 12 hours  ascorbic acid 500 mg oral tablet: 1 tab(s) orally once a day  atovaquone 750 mg/5 mL oral suspension: 10 milliliter(s) orally once a day  folic acid 1 mg oral tablet: 1 tab(s) orally once a day  gabapentin 100 mg oral capsule: 2 cap(s) orally once a day (at bedtime)  hydrocortisone 25 mg rectal suppository: 1 suppository(ies) rectal 2 times a day  insulin lispro 100 units/mL injectable solution: injectable 4 times a day (before meals and at bedtime) Low-dose insulin sliding scale  ipratropium-albuterol 0.5 mg-2.5 mg/3 mL inhalation solution: 3 milliliter(s) inhaled every 6 hours As needed Shortness of Breath and/or Wheezing  lactobacillus acidophilus oral capsule: 1 cap(s) orally 2 times a day  loperamide 2 mg oral capsule: 1 cap(s) orally 3 times a day As needed Diarrhea  melatonin 3 mg oral tablet: 2 tab(s) orally once a day (at bedtime)  metoprolol tartrate 25 mg oral tablet: 1 tab(s) orally 2 times a day  Multiple Vitamins oral tablet: 1 tab(s) orally once a day  nystatin 100,000 units/g topical powder: 1 Apply topically to affected area 2 times a day  ocular lubricant ophthalmic solution: 1 drop(s) to each affected eye every 12 hours  pantoprazole 40 mg oral delayed release tablet: 1 tab(s) orally once a day (before a meal)  polyethylene glycol 3350 oral powder for reconstitution: 17 gram(s) orally once a day As needed Constipation  psyllium 3.4 g/7 g oral powder for reconstitution: 1 packet(s) orally once a day  senna leaf extract oral tablet: 2 tab(s) orally once a day (at bedtime)  sodium chloride 0.65% nasal spray: 1 spray(s) nasal 3 times a day  zinc oxide 40% topical ointment: Apply topically to affected area 2 times a day   acetaminophen 325 mg oral tablet: 2 tab(s) orally every 6 hours As needed Temp greater or equal to 38C (100.4F), Mild Pain (1 - 3), Moderate Pain (4 - 6)  ALPRAZolam 0.25 mg oral tablet: 1 tab(s) orally every 6 hours as needed for Anxiety MDD: 4  aluminum hydroxide-magnesium hydroxide 200 mg-200 mg/5 mL oral suspension: 30 milliliter(s) orally every 4 hours As needed Dyspepsia  ammonium lactate 12% topical lotion: 1 Apply topically to affected area every 12 hours As needed BL feet dryness  apixaban 5 mg oral tablet: 1 tab(s) orally every 12 hours  ascorbic acid 500 mg oral tablet: 1 tab(s) orally once a day  atovaquone 750 mg/5 mL oral suspension: 10 milliliter(s) orally once a day  Atrovent HFA 17 mcg/inh inhalation aerosol: 2 puff(s) by metered dose inhaler 2 times a day  benzonatate 100 mg oral capsule: 1 cap(s) orally 3 times a day as needed for Cough  bisacodyl 5 mg oral delayed release tablet: 1 tab(s) orally once a day As needed Constipation  folic acid 1 mg oral tablet: 1 tab(s) orally once a day  gabapentin 300 mg oral capsule: 1 cap(s) orally 3 times a day  hydrocortisone 25 mg rectal suppository: 1 suppository(ies) rectal 2 times a day as needed for Hemorrhoids  lactobacillus acidophilus oral capsule: 1 cap(s) orally 2 times a day  lidocaine 4% topical film: Apply topically to affected area once a day Apply patches to affected area from 8AM-8PM. Okay to substitute OTC Salonpas patches.  loperamide 2 mg oral capsule: 1 cap(s) orally 3 times a day As needed Diarrhea  melatonin 3 mg oral tablet: 2 tab(s) orally once a day (at bedtime)  menthol-benzocaine topical: 1 lozenge orally 2 times a day  methylPREDNISolone 4 mg oral tablet: 7 tab(s) orally once a day Last dose evening of 12/28.  methylPREDNISolone 4 mg oral tablet: 5 tab(s) orally once a day Start12/29, last dose on 1/12. MDD: 1  methylPREDNISolone 4 mg oral tablet: 4 tab(s) orally once a day Start 1/13, last dose 1/20 MDD: 1  methylPREDNISolone 4 mg oral tablet: 3 tab(s) orally once a day Start 1/21, last dose on 1/27 MDD: 1  methylPREDNISolone 4 mg oral tablet: 2 tab(s) orally once a day Start 1/28, last dose on 2/3 MDD: 1  methylPREDNISolone 4 mg oral tablet: 1 tab(s) orally once a day Start 2/4, last dose on 2/10 MDD: 1  metoprolol tartrate 25 mg oral tablet: 0.5 tab(s) orally 2 times a day  Multiple Vitamins oral tablet: 1 tab(s) orally once a day  nystatin 100,000 units/g topical powder: Apply topically to affected area 2 times a day 1 Apply topically to affected area 2 times a day  ocular lubricant ophthalmic solution: 1 drop(s) to each affected eye every 12 hours  pantoprazole 40 mg oral delayed release tablet: 1 tab(s) orally once a day (before a meal)  polyethylene glycol 3350 oral powder for reconstitution: 17 gram(s) orally once a day  psyllium 3.4 g/7 g oral powder for reconstitution: 1 packet(s) orally once a day  senna leaf extract oral tablet: 2 tab(s) orally once a day (at bedtime)  sodium chloride 0.65% nasal spray: 1 spray(s) nasal 3 times a day  zinc oxide 40% topical ointment: Apply topically to affected area 2 times a day   acetaminophen 325 mg oral tablet: 2 tab(s) orally every 6 hours As needed Temp greater or equal to 38C (100.4F), Mild Pain (1 - 3), Moderate Pain (4 - 6)  ALPRAZolam 0.25 mg oral tablet: 1 tab(s) orally every 6 hours as needed for Anxiety MDD: 4  aluminum hydroxide-magnesium hydroxide 200 mg-200 mg/5 mL oral suspension: 30 milliliter(s) orally every 4 hours As needed Dyspepsia  ammonium lactate 12% topical lotion: 1 Apply topically to affected area every 12 hours As needed BL feet dryness  apixaban 5 mg oral tablet: 1 tab(s) orally every 12 hours  ascorbic acid 500 mg oral tablet: 1 tab(s) orally once a day  atovaquone 750 mg/5 mL oral suspension: 10 milliliter(s) orally once a day  Atrovent HFA 17 mcg/inh inhalation aerosol: 2 puff(s) by metered dose inhaler 2 times a day  benzonatate 100 mg oral capsule: 1 cap(s) orally 3 times a day as needed for Cough  bisacodyl 5 mg oral delayed release tablet: 1 tab(s) orally once a day As needed Constipation  folic acid 1 mg oral tablet: 1 tab(s) orally once a day  gabapentin 300 mg oral capsule: 1 cap(s) orally 3 times a day  hydrocortisone 25 mg rectal suppository: 1 suppository(ies) rectal 2 times a day as needed for Hemorrhoids  lactobacillus acidophilus oral capsule: 1 cap(s) orally 2 times a day  lidocaine 4% topical film: Apply topically to affected area once a day Apply patches to affected area from 8AM-8PM. Okay to substitute OTC Salonpas patches.  loperamide 2 mg oral capsule: 1 cap(s) orally 3 times a day As needed Diarrhea  melatonin 3 mg oral tablet: 2 tab(s) orally once a day (at bedtime)  menthol-benzocaine topical: 1 lozenge orally 2 times a day  methylPREDNISolone 4 mg oral tablet: 7 tab(s) orally once a day Please take 7 tablets once a day until 12/28  Please take 5 tablets once a day from 12/29-1/12  Please take 4 tablets once a day from 1/13-1/20  Please take 3 tablets once a day from 1/21-1/27  Please take 2 tablets once a day from 1/28-2/3  Please take 1 tablet once a day from 2/4-2/10  metoprolol tartrate 25 mg oral tablet: 0.5 tab(s) orally 2 times a day  Multiple Vitamins oral tablet: 1 tab(s) orally once a day  nystatin 100,000 units/g topical powder: Apply topically to affected area 2 times a day 1 Apply topically to affected area 2 times a day  ocular lubricant ophthalmic solution: 1 drop(s) to each affected eye every 12 hours  pantoprazole 40 mg oral delayed release tablet: 1 tab(s) orally once a day (before a meal)  polyethylene glycol 3350 oral powder for reconstitution: 17 gram(s) orally once a day  psyllium 3.4 g/7 g oral powder for reconstitution: 1 packet(s) orally once a day  senna leaf extract oral tablet: 2 tab(s) orally once a day (at bedtime)  sodium chloride 0.65% nasal spray: 1 spray(s) nasal 3 times a day  zinc oxide 40% topical ointment: Apply topically to affected area 2 times a day   acetaminophen 325 mg oral tablet: 2 tab(s) orally every 6 hours As needed Temp greater or equal to 38C (100.4F), Mild Pain (1 - 3), Moderate Pain (4 - 6)  ALPRAZolam 0.25 mg oral tablet: 1 tab(s) orally every 6 hours as needed for Anxiety MDD: 4  aluminum hydroxide-magnesium hydroxide 200 mg-200 mg/5 mL oral suspension: 30 milliliter(s) orally every 4 hours As needed Dyspepsia  ammonium lactate 12% topical lotion: 1 Apply topically to affected area every 12 hours As needed BL feet dryness  apixaban 5 mg oral tablet: 1 tab(s) orally every 12 hours  ascorbic acid 500 mg oral tablet: 1 tab(s) orally once a day  atovaquone 750 mg/5 mL oral suspension: 10 milliliter(s) orally once a day  Atrovent HFA 17 mcg/inh inhalation aerosol: 2 puff(s) by metered dose inhaler 2 times a day  baclofen 5 mg oral tablet: 1 tab(s) orally every 8 hours MDD: 3  benzonatate 100 mg oral capsule: 1 cap(s) orally 3 times a day as needed for Cough  bisacodyl 5 mg oral delayed release tablet: 1 tab(s) orally once a day As needed Constipation  folic acid 1 mg oral tablet: 1 tab(s) orally once a day  gabapentin 300 mg oral capsule: 1 cap(s) orally 3 times a day  hydrocortisone 25 mg rectal suppository: 1 suppository(ies) rectal 2 times a day as needed for Hemorrhoids  lactobacillus acidophilus oral capsule: 1 cap(s) orally 2 times a day  lidocaine 4% topical film: Apply topically to affected area once a day Apply patches to affected area from 8AM-8PM. Okay to substitute OTC Salonpas patches.  loperamide 2 mg oral capsule: 1 cap(s) orally 3 times a day As needed Diarrhea  melatonin 3 mg oral tablet: 2 tab(s) orally once a day (at bedtime)  menthol-benzocaine topical: 1 lozenge orally 2 times a day  methylPREDNISolone 4 mg oral tablet: 7 tab(s) orally once a day Please take 7 tablets once a day until 12/28  Please take 5 tablets once a day from 12/29-1/12  Please take 4 tablets once a day from 1/13-1/20  Please take 3 tablets once a day from 1/21-1/27  Please take 2 tablets once a day from 1/28-2/3  Please take 1 tablet once a day from 2/4-2/10  metoprolol tartrate 25 mg oral tablet: 0.5 tab(s) orally 2 times a day  Multiple Vitamins oral tablet: 1 tab(s) orally once a day  nystatin 100,000 units/g topical powder: Apply topically to affected area 2 times a day 1 Apply topically to affected area 2 times a day  ocular lubricant ophthalmic solution: 1 drop(s) to each affected eye every 12 hours  pantoprazole 40 mg oral delayed release tablet: 1 tab(s) orally once a day (before a meal)  polyethylene glycol 3350 oral powder for reconstitution: 17 gram(s) orally once a day  psyllium 3.4 g/7 g oral powder for reconstitution: 1 packet(s) orally once a day  senna leaf extract oral tablet: 2 tab(s) orally once a day (at bedtime)  sodium chloride 0.65% nasal spray: 1 spray(s) nasal 3 times a day  zinc oxide 40% topical ointment: Apply topically to affected area 2 times a day

## 2023-10-25 NOTE — DISCHARGE NOTE PROVIDER - CARE PROVIDER_API CALL
Marie López  Physical/Rehab Medicine  [address] [place] [address]  Phone: [phone]  Fax: [phone]  Follow Up Time: 1 month    Priya Addyobdulio Harley  Pulmonary Disease  [address] [facility], [address]  [place], [place] [address]  Phone: [phone]  Fax: [phone]  Follow Up Time: 1 month    Trae Whitney  Pulmonary Disease  [address], 4 [place] [address]  Phone: [phone]  Fax: [phone]  Follow Up Time: 2 weeks    Ryanne Allen  Rheumatology  [address] [address]  Phone: [phone]  Fax: [phone]  Follow Up Time: 1 week    Kyle Pierce  Internal Medicine  [address] [place] [address], Floor 5  [place] [address]  Phone: [phone]  Fax: [phone]  Follow Up Time: 2 weeks    Kwame Hawley  Critical Care Medicine  [address] [facility], [address]  [place] [address]  Phone: [phone]  Fax: [phone]  Follow Up Time: 1 week   Marie López  Physical/Rehab Medicine  [address] [place] [address]  Phone: [phone]  Fax: [phone]  Follow Up Time: 1 month    Priya Addyobdulio Harley  Pulmonary Disease  [address] [facility], [address]  [place], [place] [address]  Phone: [phone]  Fax: [phone]  Follow Up Time: 1 month    Trae Whitney  Pulmonary Disease  [address], 4 [place] [address]  Phone: [phone]  Fax: [phone]  Follow Up Time: 2 weeks    Ryanne Allen  Rheumatology  [address] [address]  Phone: [phone]  Fax: [phone]  Follow Up Time: 1 week    Kyle Pierce  Internal Medicine  [address] [place] [address], Floor 5  [place] [address]  Phone: [phone]  Fax: [phone]  Follow Up Time: 2 weeks    Kwame Hawley  Critical Care Medicine  [address] [facility], [address]  [place] [address]  Phone: [phone]  Fax: [phone]  Follow Up Time: 1 week   Marie López  Physical/Rehab Medicine  [address] [place] [address]  Phone: [phone]  Fax: [phone]  Follow Up Time: 1 month    rPiya Addyobdulio Harley  Pulmonary Disease  [address] [facility], [address]  [place], [place] [id number]  Phone: [phone]  Fax: [phone]  Follow Up Time: 1 month    Trae Whitney  Pulmonary Disease  [address], 4 [place] [address]  Phone: [phone]  Fax: [phone]  Follow Up Time: 2 weeks    Ryanne Allen  Rheumatology  [address] [address]  Phone: [phone]  Fax: [phone]  Follow Up Time: 1 week    Kyle Pierce  Internal Medicine  [address] [place] [address], Floor 5  [place] [address]  Phone: [phone]  Fax: [phone]  Follow Up Time: 2 weeks    Kwame Hawley  Critical Care Medicine  [address] [facility], [address]  [place] [address]  Phone: [phone]  Fax: [phone]  Follow Up Time: 1 week   MaribelMarie moore  Physical/Rehab Medicine  [address] [place] [address]  Phone: [phone]  Fax: [phone]  Follow Up Time: 1 month    Addy Powers  Pulmonary Disease  [address] [facility], [address]  [place] [address]  Phone: [phone]  Fax: [phone]  Follow Up Time: 1 month    Davieof Trae  Pulmonary Disease  [address], 4 [place] [address]  Phone: [phone]  Fax: [phone]  Follow Up Time: 2 weeks    Ryanne Allen  Rheumatology  [address] [address]  Phone: [phone]  Fax: [phone]  Follow Up Time: 1 week    Kyle Pierce  Internal Medicine  [address] [place] [address], Floor 5  [place] [address]  Phone: [phone]  Fax: [phone]  Follow Up Time: 2 weeks    Kwame Hawley  Critical Care Medicine  [address] [facility], [address]  [place] [address]  Phone: [phone]  Fax: [phone]  Follow Up Time: 1 week    Candace Vasquez  Rheumatology  [address] [place] [address]  Phone: [phone]  Fax: [phone]  Follow Up Time: 1 week    Zak Ellison  Infectious Disease  [address] [facility], [address]  [place] [address]  Phone: [phone]  Fax: [phone]  Follow Up Time: 1 week    Elizabet Kathleen  Psychiatry  [address] [place] [address]  Phone: [phone]  Fax: [phone]  Follow Up Time: 1 week   MaribelMarie moore  Physical/Rehab Medicine  [address] [place] [address]  Phone: [phone]  Fax: [phone]  Follow Up Time: 1 month    Addy Powers  Pulmonary Disease  410 [facility], [address]  [place] [address]  Phone: [phone]  Fax: [phone]  Follow Up Time: 1 month    Davieof Trae  Pulmonary Disease  [address], 4 [place] [address]  Phone: [phone]  Fax: [phone]  Follow Up Time: 2 weeks    Ryanne Allen  Rheumatology  [address] [address]  Phone: [phone]  Fax: [phone]  Follow Up Time: 1 week    Kyle Pierce  Internal Medicine  [address] [place] [address], Floor 5  [place] [address]  Phone: [phone]  Fax: [phone]  Follow Up Time: 2 weeks    Kwame Hawley  Critical Care Medicine  [address] [facility], [address]  [place] [address]  Phone: [phone]  Fax: [phone]  Follow Up Time: 1 week    Candace Vasquez  Rheumatology  [address] [place] [address]  Phone: [phone]  Fax: [phone]  Follow Up Time: 1 week    Zak Ellison  Infectious Disease  [address] [facility], [address]  [place] [address]  Phone: [phone]  Fax: [phone]  Follow Up Time: 1 week    Elizabet Kathleen  Psychiatry  [address] [place] [address]  Phone: [phone]  Fax: [phone]  Follow Up Time: 1 week   MaribelMarie moore  Physical/Rehab Medicine  [address] [place] [address]  Phone: [phone]  Fax: [phone]  Follow Up Time: 1 month    Addy Powers  Pulmonary Disease  [address] [facility], [address]  [place] [address]  Phone: [phone]  Fax: [phone]  Follow Up Time: 1 month    Davieof Trae  Pulmonary Disease  [address], 4 [place] [address]  Phone: [phone]  Fax: [phone]  Follow Up Time: 2 weeks    Ryanne Allen  Rheumatology  [address] [address]  Phone: [phone]  Fax: [phone]  Follow Up Time: 1 week    Kyle Pierce  Internal Medicine  [address] [place] [address], Floor 5  [place] [address]  Phone: [phone]  Fax: [phone]  Follow Up Time: 2 weeks    Kwame Hawley  Critical Care Medicine  [address] [facility], [address]  [place] [address]  Phone: [phone]  Fax: [phone]  Follow Up Time: 1 week    Candace Vasquez  Rheumatology  [address] [place] [address]  Phone: [phone]  Fax: [phone]  Follow Up Time: 1 week    Zak Ellison  Infectious Disease  [address] [facility], [address]  [place] [address]  Phone: [phone]  Fax: [phone]  Follow Up Time: 1 week    Elizabet Kathleen  Psychiatry  [address] [place] [address]  Phone: [phone]  Fax: [phone]  Follow Up Time: 1 week   MaribelMarie moore  Physical/Rehab Medicine  [address] [place] [address]  Phone: [phone]  Fax: [phone]  Follow Up Time: 2 weeks    Addy Powers  Pulmonary Disease  [address] [facility], [address]  [place] [address]  Phone: [phone]  Fax: [phone]  Follow Up Time: 1 month    Chelo Trae  Pulmonary Disease  [address], 4 [place] [address]  Phone: [phone]  Fax: [phone]  Follow Up Time: 2 weeks    Ryanne Allen  Rheumatology  [address] [address]  Phone: [phone]  Fax: [phone]  Follow Up Time: 1 week    Kyle Pierce  Internal Medicine  [address] [place] [address], Floor 5  [place] [address]  Phone: [phone]  Fax: [phone]  Follow Up Time: 2 weeks    Kwame Hawley  Critical Care Medicine  [address] [facility], [address]  [place] [address]  Phone: [phone]  Fax: [phone]  Follow Up Time: 1 week    Candace Vasquez  Rheumatology  [address] [place] [address]  Phone: [phone]  Fax: [phone]  Follow Up Time: 1 week    Zak Ellison  Infectious Disease  [address] [facility], [address]  [place] [address]  Phone: [phone]  Fax: [phone]  Follow Up Time: 1 week    Elizabet Kathleen  Psychiatry  [address] [place] [address]  Phone: [phone]  Fax: [phone]  Follow Up Time: 1 week   MaribelMarie moore  Physical/Rehab Medicine  [address] [place] [address]  Phone: [phone]  Fax: [phone]  Follow Up Time: 2 weeks    Addy Powers  Pulmonary Disease  [address] [facility], [address]  [place] [address]  Phone: [phone]  Fax: [phone]  Follow Up Time: 1 month    Chelo Trae  Pulmonary Disease  [address], [address] [place] [address]  Phone: [phone]  Fax: [phone]  Follow Up Time: 2 weeks    Ryanne Allen  Rheumatology  [address] [address]  Phone: [phone]  Fax: [phone]  Follow Up Time: 1 week    Kyle Pierce  Internal Medicine  [address] [place] [address], Floor 5  [place] [address]  Phone: [phone]  Fax: [phone]  Follow Up Time: 2 weeks    Kwame Hawley  Critical Care Medicine  [address] [facility], [address]  [place] [address]  Phone: [phone]  Fax: [phone]  Follow Up Time: 1 week    Candace Vasquez  Rheumatology  [address] [place] [address]  Phone: [phone]  Fax: [phone]  Follow Up Time: 1 week    Zak Ellison  Infectious Disease  [address] [facility], [address]  [place] [address]  Phone: [phone]  Fax: [phone]  Follow Up Time: 1 week    Elizabet Kathelen  Psychiatry  [address] [place] [address]  Phone: [phone]  Fax: [phone]  Follow Up Time: 1 week   MaribelMarie moore  Physical/Rehab Medicine  [address] [place] [address]  Phone: [phone]  Fax: [phone]  Follow Up Time: 2 weeks    Addy Powers  Pulmonary Disease  [address] [facility], [address]  [place] [address]  Phone: [phone]  Fax: [phone]  Follow Up Time: 1 month    Chelo Trae  Pulmonary Disease  [address], [address] [place] [address]  Phone: [phone]  Fax: [phone]  Follow Up Time: 2 weeks    Ryanne Allen  Rheumatology  [address] [address]  Phone: [phone]  Fax: [phone]  Follow Up Time: 1 week    Kyle Pierce  Internal Medicine  [address] [place] [address], Floor 5  [place] [address]  Phone: [phone]  Fax: [phone]  Follow Up Time: 2 weeks    Kwame Hawley  Critical Care Medicine  [address] [facility], [address]  [place] [address]  Phone: [phone]  Fax: [phone]  Follow Up Time: 1 week    Candace Vasquez  Rheumatology  [address] [place] [address]  Phone: [phone]  Fax: [phone]  Follow Up Time: 1 week    Zak Ellison  Infectious Disease  [address] [facility], [address]  [place] [address]  Phone: [phone]  Fax: [phone]  Follow Up Time: 1 week    Elizabet Kathleen  Psychiatry  [address] [place] [address]  Phone: [phone]  Fax: [phone]  Follow Up Time: 1 week

## 2023-10-25 NOTE — PROGRESS NOTE ADULT - SUBJECTIVE AND OBJECTIVE BOX
CC: Non-specific Interstitial Lung Disease     Today's Subjective & Objective Findings:  Patient seen and examined at bedside this AM.   Partner present.  Admits to sleeping well.   Last BM on 10/24, per patient.  Loose BMs have resolved.  No other complaints at this time.     ROS --No dyspnea, head ache, chest or abd pain      Therapy-- engaging, motivated  Had some standing exercise today, Damaris lift, 3 person, max assist  Felt pressure on b/l groin, hence advised therapists to add b/l groin padding during subsequent stand up exercises or anytime straps are being applied to groin  Engaged in UE muscle strengthening  Will continue to engage for  E stim to upper extremity muscles       Vital Signs Last 24 Hrs  T(C): 36.6 (25 Oct 2023 09:35), Max: 36.7 (24 Oct 2023 13:41)  T(F): 97.8 (25 Oct 2023 09:35), Max: 98.1 (24 Oct 2023 13:41)  HR: 88 (25 Oct 2023 09:35) (78 - 100)  BP: 123/77 (25 Oct 2023 09:35) (96/61 - 144/76)  BP(mean): --  RR: 16 (25 Oct 2023 09:35) (16 - 16)  SpO2: 97% (25 Oct 2023 09:35) (94% - 97%)      PHYSICAL EXAM:  Gen -  Comfortable,   Sitting on a recliner chair at bed side,  HEENT - , nostrils are moist, no bleeding, mod icterus  Neck - Supple, No limited ROM, O2 via NC  Pulm - good   Cardiovascular - RRR, S1S2  Chest - good chest expansion,   Abdomen - Soft, non tender, +BS,   Extremities - No Cyanosis, no clubbing, 1+ edema to b/l feet, non pitting,    no calf tenderness, LUE Med line    Neuro-     Cognitive - awake, alert, fully oriented, engaging, speech normal      Motor -                    LEFT    UE - 4/5                    RIGHT UE - 4/5                     LEFT    LE - 2 +-/5 limited by leg edema                    RIGHT LE - 2+-/5  limitted by leg edema,, which is reducing      Sensory - Intact        Reflexes - DTR 1+      Coordination - FTN  impaired  due to weakness   MSK: soreness and weakness  Psychiatric - Mood stable, Affect WNL  Skin: Left lower abdomen ruptured blister 3.0 x 1.0, stage 2 pressure injury to right buttock 4 x1 and left buttock 3x1, stage 2 pressure injury ti right inner thigh 0.2 x 0.2, right groin wound 10.5 x 2.0, Left groin wound 5 x 5,      MMT--Able to contact B/L upper back muscles,   Elbow abduction and adduction, gravity eliminated 2+      LABS:                        9.0    6.42  )-----------( 195      ( 23 Oct 2023 06:37 )             29.3     10-23    138  |  101  |  14  ----------------------------<  98  3.1<L>   |  30  |  0.30<L>    Ca    8.7      23 Oct 2023 06:37    TPro  4.8<L>  /  Alb  2.1<L>  /  TBili  3.0<H>  /  DBili  x   /  AST  34  /  ALT  82<H>  /  AlkPhos  248<H>  10-23      Urinalysis Basic - ( 23 Oct 2023 06:37 )    Color: x / Appearance: x / SG: x / pH: x  Gluc: 98 mg/dL / Ketone: x  / Bili: x / Urobili: x   Blood: x / Protein: x / Nitrite: x   Leuk Esterase: x / RBC: x / WBC x   Sq Epi: x / Non Sq Epi: x / Bacteria: x        MEDICATIONS  (STANDING):  apixaban 5 milliGRAM(s) Oral every 12 hours  artificial  tears Solution 1 Drop(s) Both EYES every 12 hours  ascorbic acid 500 milliGRAM(s) Oral daily  atovaquone  Suspension 1500 milliGRAM(s) Oral daily  dextrose 5%. 1000 milliLiter(s) (50 mL/Hr) IV Continuous <Continuous>  dextrose 5%. 1000 milliLiter(s) (100 mL/Hr) IV Continuous <Continuous>  dextrose 50% Injectable 25 Gram(s) IV Push once  dextrose 50% Injectable 25 Gram(s) IV Push once  dextrose 50% Injectable 12.5 Gram(s) IV Push once  folic acid 1 milliGRAM(s) Oral daily  gabapentin 200 milliGRAM(s) Oral at bedtime  glucagon  Injectable 1 milliGRAM(s) IntraMuscular once  hydrocortisone hemorrhoidal Suppository 1 Suppository(s) Rectal two times a day  influenza   Vaccine 0.5 milliLiter(s) IntraMuscular once  lactobacillus acidophilus 1 Tablet(s) Oral two times a day with meals  melatonin 6 milliGRAM(s) Oral at bedtime  metoprolol tartrate 25 milliGRAM(s) Oral two times a day  multivitamin 1 Tablet(s) Oral daily  nystatin Powder 1 Application(s) Topical two times a day  pantoprazole    Tablet 40 milliGRAM(s) Oral before breakfast  potassium chloride    Tablet ER 40 milliEquivalent(s) Oral every 4 hours  psyllium Powder 1 Packet(s) Oral daily  senna 2 Tablet(s) Oral at bedtime  sodium chloride 0.9% lock flush 5 milliLiter(s) IV Push two times a day  zinc oxide 40% Paste 1 Application(s) Topical three times a day    MEDICATIONS  (PRN):  albuterol/ipratropium for Nebulization 3 milliLiter(s) Nebulizer every 6 hours PRN Shortness of Breath and/or Wheezing  aluminum hydroxide/magnesium hydroxide/simethicone Suspension 30 milliLiter(s) Oral every 4 hours PRN Dyspepsia  dextrose Oral Gel 15 Gram(s) Oral once PRN Blood Glucose LESS THAN 70 milliGRAM(s)/deciliter  loperamide 2 milliGRAM(s) Oral three times a day PRN Diarrhea  polyethylene glycol 3350 17 Gram(s) Oral daily PRN Constipation  SIMETHICONE SOFTGELS 250 milliGRAM(s) 250 milliGRAM(s) Oral three times a day PRN for gas  sodium chloride 0.65% Nasal 1 Spray(s) Both Nostrils every 8 hours PRN Nasal Congestion    < from: Xray Abdomen 1 View PORTABLE -Routine (Xray Abdomen 1 View PORTABLE -Routine .) (10.19.23 @ 09:04) >  COMPARISON: 9/19/2023    Frontal view of the abdomen shows a normal gas pattern. Moderate stool   burden is present. No definite free air is identified.    IMPRESSION:  No evidence of bowel obstruction.

## 2023-10-26 LAB
ALBUMIN SERPL ELPH-MCNC: 2.1 G/DL — LOW (ref 3.3–5)
ALBUMIN SERPL ELPH-MCNC: 2.1 G/DL — LOW (ref 3.3–5)
ALP SERPL-CCNC: 274 U/L — HIGH (ref 40–120)
ALP SERPL-CCNC: 274 U/L — HIGH (ref 40–120)
ALT FLD-CCNC: 78 U/L — HIGH (ref 10–45)
ALT FLD-CCNC: 78 U/L — HIGH (ref 10–45)
ANION GAP SERPL CALC-SCNC: 8 MMOL/L — SIGNIFICANT CHANGE UP (ref 5–17)
ANION GAP SERPL CALC-SCNC: 8 MMOL/L — SIGNIFICANT CHANGE UP (ref 5–17)
AST SERPL-CCNC: 43 U/L — HIGH (ref 10–40)
AST SERPL-CCNC: 43 U/L — HIGH (ref 10–40)
BASOPHILS # BLD AUTO: 0 K/UL — SIGNIFICANT CHANGE UP (ref 0–0.2)
BASOPHILS # BLD AUTO: 0 K/UL — SIGNIFICANT CHANGE UP (ref 0–0.2)
BASOPHILS NFR BLD AUTO: 0 % — SIGNIFICANT CHANGE UP (ref 0–2)
BASOPHILS NFR BLD AUTO: 0 % — SIGNIFICANT CHANGE UP (ref 0–2)
BILIRUB SERPL-MCNC: 2.8 MG/DL — HIGH (ref 0.2–1.2)
BILIRUB SERPL-MCNC: 2.8 MG/DL — HIGH (ref 0.2–1.2)
BUN SERPL-MCNC: 13 MG/DL — SIGNIFICANT CHANGE UP (ref 7–23)
BUN SERPL-MCNC: 13 MG/DL — SIGNIFICANT CHANGE UP (ref 7–23)
CALCIUM SERPL-MCNC: 8.8 MG/DL — SIGNIFICANT CHANGE UP (ref 8.4–10.5)
CALCIUM SERPL-MCNC: 8.8 MG/DL — SIGNIFICANT CHANGE UP (ref 8.4–10.5)
CHLORIDE SERPL-SCNC: 102 MMOL/L — SIGNIFICANT CHANGE UP (ref 96–108)
CHLORIDE SERPL-SCNC: 102 MMOL/L — SIGNIFICANT CHANGE UP (ref 96–108)
CO2 SERPL-SCNC: 28 MMOL/L — SIGNIFICANT CHANGE UP (ref 22–31)
CO2 SERPL-SCNC: 28 MMOL/L — SIGNIFICANT CHANGE UP (ref 22–31)
CREAT SERPL-MCNC: 0.41 MG/DL — LOW (ref 0.5–1.3)
CREAT SERPL-MCNC: 0.41 MG/DL — LOW (ref 0.5–1.3)
EGFR: 110 ML/MIN/1.73M2 — SIGNIFICANT CHANGE UP
EGFR: 110 ML/MIN/1.73M2 — SIGNIFICANT CHANGE UP
EOSINOPHIL # BLD AUTO: 0 K/UL — SIGNIFICANT CHANGE UP (ref 0–0.5)
EOSINOPHIL # BLD AUTO: 0 K/UL — SIGNIFICANT CHANGE UP (ref 0–0.5)
EOSINOPHIL NFR BLD AUTO: 0 % — SIGNIFICANT CHANGE UP (ref 0–6)
EOSINOPHIL NFR BLD AUTO: 0 % — SIGNIFICANT CHANGE UP (ref 0–6)
GLUCOSE SERPL-MCNC: 166 MG/DL — HIGH (ref 70–99)
GLUCOSE SERPL-MCNC: 166 MG/DL — HIGH (ref 70–99)
HCT VFR BLD CALC: 30 % — LOW (ref 34.5–45)
HCT VFR BLD CALC: 30 % — LOW (ref 34.5–45)
HGB BLD-MCNC: 9.3 G/DL — LOW (ref 11.5–15.5)
HGB BLD-MCNC: 9.3 G/DL — LOW (ref 11.5–15.5)
HYPOCHROMIA BLD QL: SLIGHT — SIGNIFICANT CHANGE UP
HYPOCHROMIA BLD QL: SLIGHT — SIGNIFICANT CHANGE UP
LYMPHOCYTES # BLD AUTO: 0.1 K/UL — LOW (ref 1–3.3)
LYMPHOCYTES # BLD AUTO: 0.1 K/UL — LOW (ref 1–3.3)
LYMPHOCYTES # BLD AUTO: 2 % — LOW (ref 13–44)
LYMPHOCYTES # BLD AUTO: 2 % — LOW (ref 13–44)
MACROCYTES BLD QL: SLIGHT — SIGNIFICANT CHANGE UP
MACROCYTES BLD QL: SLIGHT — SIGNIFICANT CHANGE UP
MANUAL SMEAR VERIFICATION: SIGNIFICANT CHANGE UP
MANUAL SMEAR VERIFICATION: SIGNIFICANT CHANGE UP
MCHC RBC-ENTMCNC: 31 GM/DL — LOW (ref 32–36)
MCHC RBC-ENTMCNC: 31 GM/DL — LOW (ref 32–36)
MCHC RBC-ENTMCNC: 31.3 PG — SIGNIFICANT CHANGE UP (ref 27–34)
MCHC RBC-ENTMCNC: 31.3 PG — SIGNIFICANT CHANGE UP (ref 27–34)
MCV RBC AUTO: 101 FL — HIGH (ref 80–100)
MCV RBC AUTO: 101 FL — HIGH (ref 80–100)
MONOCYTES # BLD AUTO: 0.05 K/UL — SIGNIFICANT CHANGE UP (ref 0–0.9)
MONOCYTES # BLD AUTO: 0.05 K/UL — SIGNIFICANT CHANGE UP (ref 0–0.9)
MONOCYTES NFR BLD AUTO: 1 % — LOW (ref 2–14)
MONOCYTES NFR BLD AUTO: 1 % — LOW (ref 2–14)
MYELOCYTES NFR BLD: 1 % — HIGH (ref 0–0)
MYELOCYTES NFR BLD: 1 % — HIGH (ref 0–0)
NEUTROPHILS # BLD AUTO: 4.87 K/UL — SIGNIFICANT CHANGE UP (ref 1.8–7.4)
NEUTROPHILS # BLD AUTO: 4.87 K/UL — SIGNIFICANT CHANGE UP (ref 1.8–7.4)
NEUTROPHILS NFR BLD AUTO: 96 % — HIGH (ref 43–77)
NEUTROPHILS NFR BLD AUTO: 96 % — HIGH (ref 43–77)
NRBC # BLD: 0 /100 — SIGNIFICANT CHANGE UP (ref 0–0)
NRBC # BLD: 0 /100 — SIGNIFICANT CHANGE UP (ref 0–0)
PLAT MORPH BLD: NORMAL — SIGNIFICANT CHANGE UP
PLAT MORPH BLD: NORMAL — SIGNIFICANT CHANGE UP
PLATELET # BLD AUTO: 207 K/UL — SIGNIFICANT CHANGE UP (ref 150–400)
PLATELET # BLD AUTO: 207 K/UL — SIGNIFICANT CHANGE UP (ref 150–400)
POIKILOCYTOSIS BLD QL AUTO: SLIGHT — SIGNIFICANT CHANGE UP
POIKILOCYTOSIS BLD QL AUTO: SLIGHT — SIGNIFICANT CHANGE UP
POTASSIUM SERPL-MCNC: 3.9 MMOL/L — SIGNIFICANT CHANGE UP (ref 3.5–5.3)
POTASSIUM SERPL-MCNC: 3.9 MMOL/L — SIGNIFICANT CHANGE UP (ref 3.5–5.3)
POTASSIUM SERPL-SCNC: 3.9 MMOL/L — SIGNIFICANT CHANGE UP (ref 3.5–5.3)
POTASSIUM SERPL-SCNC: 3.9 MMOL/L — SIGNIFICANT CHANGE UP (ref 3.5–5.3)
PROT SERPL-MCNC: 5 G/DL — LOW (ref 6–8.3)
PROT SERPL-MCNC: 5 G/DL — LOW (ref 6–8.3)
RBC # BLD: 2.97 M/UL — LOW (ref 3.8–5.2)
RBC # BLD: 2.97 M/UL — LOW (ref 3.8–5.2)
RBC # FLD: 15.9 % — HIGH (ref 10.3–14.5)
RBC # FLD: 15.9 % — HIGH (ref 10.3–14.5)
RBC BLD AUTO: ABNORMAL
RBC BLD AUTO: ABNORMAL
SODIUM SERPL-SCNC: 138 MMOL/L — SIGNIFICANT CHANGE UP (ref 135–145)
SODIUM SERPL-SCNC: 138 MMOL/L — SIGNIFICANT CHANGE UP (ref 135–145)
WBC # BLD: 5.07 K/UL — SIGNIFICANT CHANGE UP (ref 3.8–10.5)
WBC # BLD: 5.07 K/UL — SIGNIFICANT CHANGE UP (ref 3.8–10.5)
WBC # FLD AUTO: 5.07 K/UL — SIGNIFICANT CHANGE UP (ref 3.8–10.5)
WBC # FLD AUTO: 5.07 K/UL — SIGNIFICANT CHANGE UP (ref 3.8–10.5)

## 2023-10-26 PROCEDURE — 99232 SBSQ HOSP IP/OBS MODERATE 35: CPT

## 2023-10-26 RX ORDER — GABAPENTIN 400 MG/1
300 CAPSULE ORAL AT BEDTIME
Refills: 0 | Status: DISCONTINUED | OUTPATIENT
Start: 2023-10-26 | End: 2023-10-31

## 2023-10-26 RX ADMIN — Medication 1 TABLET(S): at 09:01

## 2023-10-26 RX ADMIN — Medication 25 MILLIGRAM(S): at 06:58

## 2023-10-26 RX ADMIN — APIXABAN 5 MILLIGRAM(S): 2.5 TABLET, FILM COATED ORAL at 17:08

## 2023-10-26 RX ADMIN — SODIUM CHLORIDE 5 MILLILITER(S): 9 INJECTION INTRAMUSCULAR; INTRAVENOUS; SUBCUTANEOUS at 19:15

## 2023-10-26 RX ADMIN — Medication 56 MILLIGRAM(S): at 01:06

## 2023-10-26 RX ADMIN — Medication 500 MILLIGRAM(S): at 13:01

## 2023-10-26 RX ADMIN — ZINC OXIDE 1 APPLICATION(S): 200 OINTMENT TOPICAL at 06:57

## 2023-10-26 RX ADMIN — APIXABAN 5 MILLIGRAM(S): 2.5 TABLET, FILM COATED ORAL at 06:55

## 2023-10-26 RX ADMIN — ATOVAQUONE 1500 MILLIGRAM(S): 750 SUSPENSION ORAL at 17:08

## 2023-10-26 RX ADMIN — NYSTATIN CREAM 1 APPLICATION(S): 100000 CREAM TOPICAL at 17:08

## 2023-10-26 RX ADMIN — NYSTATIN CREAM 1 APPLICATION(S): 100000 CREAM TOPICAL at 06:57

## 2023-10-26 RX ADMIN — Medication 6 MILLIGRAM(S): at 22:02

## 2023-10-26 RX ADMIN — Medication 25 MILLIGRAM(S): at 17:08

## 2023-10-26 RX ADMIN — Medication 1 TABLET(S): at 17:08

## 2023-10-26 RX ADMIN — Medication 1 TABLET(S): at 13:01

## 2023-10-26 RX ADMIN — GABAPENTIN 300 MILLIGRAM(S): 400 CAPSULE ORAL at 22:02

## 2023-10-26 RX ADMIN — PANTOPRAZOLE SODIUM 40 MILLIGRAM(S): 20 TABLET, DELAYED RELEASE ORAL at 06:55

## 2023-10-26 RX ADMIN — ZINC OXIDE 1 APPLICATION(S): 200 OINTMENT TOPICAL at 22:03

## 2023-10-26 RX ADMIN — Medication 1 MILLIGRAM(S): at 13:00

## 2023-10-26 RX ADMIN — ZINC OXIDE 1 APPLICATION(S): 200 OINTMENT TOPICAL at 17:08

## 2023-10-26 NOTE — PROGRESS NOTE ADULT - ASSESSMENT
Assessment/Plan:  ALIZA FOLEY is a 62 year old female with PMH of pAFIB and sciatica; who presented to [facility] ED with SOB, and was found to have groundglass opacities and interstitial lung disease. She was treated with empiric Vancomycin and Zosyn. She underwent a bronchoscopy with bronchoalveolar lavage and pulse steroids. She was given IV diuretics for increased pulmonary congestion, but her respiratory status continued to worsen.  On 9/13, she was transferred to [facility] for ECMO requirements. She was further treated with Plasmapheresis, Rituximab and high-dose steroids, with significant improvement. Her overall hospital course was complicated by thrombocytopenia (s/p several platelet transfusions), leukocytosis (infectious workup entirely negative- s/p Vanco and Ceftriaxone), AFIB with RVR (resolved with Metoprolol), dysphagia (requiring NGT), transaminitis (secondary to acute illness and hypotension),  non-occlusive RIJ DVT (started on Lovenox). Patient now admitted for a multidisciplinary rehab program. 10-05-23 @ 13:18      * Rehab team to explore all potential DC options (EMIGDIO, extended stay/self pay?, etc) , SW reminded and will explore this and update patient  * Wound care following  * Pulmonary team following   * Gabapentin increased to 300mg at HS    #Interstitial Lung Disease and Mixed connective tissue disease  - Gait Instability, ADL impairments and Functional impairments: start Comprehensive Rehab Program of PT/OT/SLP - 3 hours a day, 5 days a week  - P&O as needed   - Non-specific Interstitial Lung Disease  - Requiring ECMO   - Improvement s/p Plasmapheresis, Rituximab and high- dose steroids   - Albuterol/Ipratropium Nebulizer every 6 hours  - Mepron 1500mg daily  - PO Medrol ( due to liver dysfunction): Plan will be to taper by ~20% every 2 weeks  Medrol   64mg x 2 weeks   56mg x 2 weeks  44mg x 2 weeks   36mg x 2 weeks - Follow up Outpatient   -- Repeat CT Chest in 6 weeks   --Pulmonary team--f/u with patient and clarify when she can get immunizations    -Perform BL elbow flexion & elbow extension with antigravity in therapy  - Noted to have missed 5 days between steroid taper dosing- resumed at 56mg on 10/26- pulm recommends to continue as originally instructed     10/24--SW will apply to insurance for extension in few days and proceed with other options depending on response  F/U to earlier discussion on 1/018    #pAFIB/Rt Ij thrombus  - Metoprolol 25mg BID and lovenox  --- Eliquis 5mg BID - tolerating well     # Chronic Tinnitus   - ENT review and recs appreciate, Patient already made ENT appointment for f/u    # Lymphedema both legs -chronic and Rt IJ thrombus  - ACE Wrap BL LEs  - BL LE doppler negative for DVT  - BL UE doppler with chronic thrombotic changes in RIJ       #Transaminitis --LFT Down trending   - Secondary to acute illness and hypotension at [facility]  - Outpatient follow up     #Neuropathy  --continue gabapentin 10/16, dose increased 10/23 to 200mg qhs  --Work on motor strengthening, priority for UE as preferred by patient     #Sleep  - Melatonin 6mg at HS    #Skin  - Skin: Left lower abdomen ruptured blister 3.0 x 1.0, stage 2 pressure injury to right buttock 4 x1 and left buttock 3x1, stage 2 pressure injury ti right inner thigh 0.2 x 0.2, right groin wound 10.5 x 2.0, Left groin wound 5 x 5,  right neck scab wound  -- MAD to skin folds- Nystatin to submammary and abdominal pannus BID  -- MAD to L groin- cleanse with NS, Triad cream daily  -- Mad to R groin- Nystatin powder daily   -- R groin wound-- aquacel packing, Triad to periwound, Allevyn foam- daily and PRN   - Pressure injury/Skin: OOB to Chair, PT/OT   - Offload pressure, low air loss support surfaces, T&P every 2 hours   --Wound care consult-10/9 -Multiple wounds--perineal and buttock/sacral, recs appreciated   --Suggest Continue treatments as below:   Twice daily & PRN Normal Saline Cleanse of abdominal pannus & breast folds, perineal, Left & Right inner buttock erosions.  Pat thoroughly dry.   Apply Desitin generously twice daily (& PRN) to perineal, buttocks, and left groin erosions, leave open to air.    Apply Nystatin powder to abdominal pannus and breast folds.  Suggest use of Interdry cloths in folds to 'wick' moisture.  Suggest daily Normal Saline Cleanse of right groin surgical wound, pat dry.  Apply Cavillon film barrier to intact periwound skin.  Place Aquacell strip over open area, cover with foam dressing.  - Wound care following       #Pain Mgmt   - Tylenol PRN   - Gabapentin 300mg at HS     #GI/Bowel Mgmt   - Pantoprazole  - Senna  --on hold due to recent Loose BM  - PRN: Maalox   - Stool count  -  Lactobacillus BID   -- AB XR mod stool burden on transverse colon 10/23--still has mod stool burden, but moving bowel appropriately  - S/p enema and lactulose    # Alternating bowel function --commenced probiotic, latobacilus   * Leucocytosis probably steroid related and partly due to her Mixed Connective Tissue Disease, will continue monitoring     #/Bladder Mgmt --voiding     #FEN   #Dysphagia  - Diet - Minced & Moist  [CC]    - Dysphaiga- SLP evaluation     #Health maintenance  - Folic Acid   - MVI  - Ocean nasal spray    # Difficulty with access to peripheral veins-- Blood draws from Left arm Medline     #Precautions / PROPHYLAXIS:   - Falls  - ortho: Weight bearing status: WBAT   - Lungs: Aspiration, Incentive Spirometer   - DVT PPX: Eliquis 5 mg BID   ---------------------------    * 10/23 Labs unremarkable,,stable anemia, LFT elevated, but downtrending, leucocytosis, non progressive    Liaison with family  Patient's partner present during review, details of review d/w her, detailed explanation of therapy protocol explained and she was happy with same  10/9--Partner present during review and discussed details treatment plan with her    Liaison with providers  Consult recs form multiple specialities being implemented  Surgical consult and recs 10/9--agreed with plan for AC for Rt IJ chronic thrombus    10/10 --No response to multiple calls to patient'srivate cardiologist Dr Otto Stratton  ph     ---------------------------  IDT conference on 10/24  Social Work: Await insurance extension/self pay.   SLP: --  OT: Total A for ADLs/transfers.   PT: Damaris for transfers. Amb 30ft with LiteGait.   RT: Continues to decline.  DME: TBD   Barriers: R groin wound care.  Functional level on DC: Max A for self care.   TDD: 11/1 to EMIGDIO.  ---------------------------  OUTPATIENT/FOLLOW UP:    Trae Whitney  Pulmonary Disease  [address], [address] [place] [address]  Phone: [phone]  Fax: [phone]  Follow Up Time: 2 weeks    Ryanne Allen  Rheumatology  [address] [address]  Phone: [phone]  Fax: [phone]  Follow Up Time: 1 week    Kyle Pierce  Internal Medicine  [address] [place] [address], [address]  [place] [address]  Phone: [phone]  Fax: [phone]  Follow Up Time: 2 weeks    Addy Powers  Pulmonary Disease  [address] [facility], [address] [id number]  Phone: [phone]  Fax: [phone]  Follow Up Time:     Kwame Hawley  Critical Care Medicine  [address] [facility], [address] [address]  Phone: [phone]  Fax: [phone]  Follow Up Time:     Neena Borrego  Rheumatology  [address] [facility], [address]  [place] [address]  Phone: [phone]  Fax: [phone]  Follow Up Time:    [facility]  [facility] [address]  Scheduled Appointment: 09/13/2023  2:40 PM  ---------------------------   Assessment/Plan:  ALIZA FOLEY is a 62 year old female with PMH of pAFIB and sciatica; who presented to [facility] ED with SOB, and was found to have groundglass opacities and interstitial lung disease. She was treated with empiric Vancomycin and Zosyn. She underwent a bronchoscopy with bronchoalveolar lavage and pulse steroids. She was given IV diuretics for increased pulmonary congestion, but her respiratory status continued to worsen.  On 9/13, she was transferred to [facility] for ECMO requirements. She was further treated with Plasmapheresis, Rituximab and high-dose steroids, with significant improvement. Her overall hospital course was complicated by thrombocytopenia (s/p several platelet transfusions), leukocytosis (infectious workup entirely negative- s/p Vanco and Ceftriaxone), AFIB with RVR (resolved with Metoprolol), dysphagia (requiring NGT), transaminitis (secondary to acute illness and hypotension),  non-occlusive RIJ DVT (started on Lovenox). Patient now admitted for a multidisciplinary rehab program. 10-05-23 @ 13:18    * Therapy admin informed about need for make up session for patient either on Sat or Sunday  * Wound care following  * Pulmonary team following   * Continue steroid taper as d/w Pulmonary today  * Gabapentin increased to 300mg at HS  * DC planning as already discussed, staring with appeal for extension of acute rehab stay duration (SW sending notes to insurance) and other options depending on rhe response  *l abs--stable anemia, normal renal function and down trending LFT     #Interstitial Lung Disease and Mixed connective tissue disease  - Gait Instability, ADL impairments and Functional impairments: start Comprehensive Rehab Program of PT/OT/SLP - 3 hours a day, 5 days a week  - P&O as needed   - Non-specific Interstitial Lung Disease  - Requiring ECMO   - Improvement s/p Plasmapheresis, Rituximab and high- dose steroids   - Albuterol/Ipratropium Nebulizer every 6 hours  - Mepron 1500mg daily  - PO Medrol ( due to liver dysfunction): Plan will be to taper by ~20% every 2 weeks  Medrol   64mg x 2 weeks   56mg x 2 weeks  44mg x 2 weeks   36mg x 2 weeks - Follow up Outpatient   -- Repeat CT Chest in 6 weeks   --Pulmonary team--f/u with patient and clarify when she can get immunizations    -Perform BL elbow flexion & elbow extension with antigravity in therapy  - Noted to have missed 5 days between steroid taper dosing- resumed at 56mg on 10/26- pulm recommends to continue as originally instructed     10/24--SW will apply to insurance for extension in few days and proceed with other options depending on response  F/U to earlier discussion on 1/018    #pAFIB/Rt Ij thrombus  - Metoprolol 25mg BID and lovenox  --- Eliquis 5mg BID - tolerating well     # Chronic Tinnitus   - ENT review and recs appreciate, Patient already made ENT appointment for f/u    # Lymphedema both legs -chronic and Rt IJ thrombus  - ACE Wrap BL LEs  - BL LE doppler negative for DVT  - BL UE doppler with chronic thrombotic changes in RIJ       #Transaminitis --LFT Down trending   - Secondary to acute illness and hypotension at [facility]  - Outpatient follow up     #Neuropathy  --continue gabapentin 10/16, dose increased 10/23 to 200mg qhs  --Work on motor strengthening, priority for UE as preferred by patient     #Sleep  - Melatonin 6mg at HS    #Skin  - Skin: Left lower abdomen ruptured blister 3.0 x 1.0, stage 2 pressure injury to right buttock 4 x1 and left buttock 3x1, stage 2 pressure injury ti right inner thigh 0.2 x 0.2, right groin wound 10.5 x 2.0, Left groin wound 5 x 5,  right neck scab wound  -- MAD to skin folds- Nystatin to submammary and abdominal pannus BID  -- MAD to L groin- cleanse with NS, Triad cream daily  -- Mad to R groin- Nystatin powder daily   -- R groin wound-- aquacel packing, Triad to periwound, Allevyn foam- daily and PRN   - Pressure injury/Skin: OOB to Chair, PT/OT   - Offload pressure, low air loss support surfaces, T&P every 2 hours   --Wound care consult-10/9 -Multiple wounds--perineal and buttock/sacral, recs appreciated   --Suggest Continue treatments as below:   Twice daily & PRN Normal Saline Cleanse of abdominal pannus & breast folds, perineal, Left & Right inner buttock erosions.  Pat thoroughly dry.   Apply Desitin generously twice daily (& PRN) to perineal, buttocks, and left groin erosions, leave open to air.    Apply Nystatin powder to abdominal pannus and breast folds.  Suggest use of Interdry cloths in folds to 'wick' moisture.  Suggest daily Normal Saline Cleanse of right groin surgical wound, pat dry.  Apply Cavillon film barrier to intact periwound skin.  Place Aquacell strip over open area, cover with foam dressing.  - Wound care following       #Pain Mgmt   - Tylenol PRN   - Gabapentin 300mg at HS     #GI/Bowel Mgmt   - Pantoprazole  - Senna  --on hold due to recent Loose BM  - PRN: Maalox   - Stool count  -  Lactobacillus BID   -- AB XR mod stool burden on transverse colon 10/23--still has mod stool burden, but moving bowel appropriately  - S/p enema and lactulose    # Alternating bowel function --commenced probiotic, latobacilus   * Leucocytosis probably steroid related and partly due to her Mixed Connective Tissue Disease, will continue monitoring     #/Bladder Mgmt --voiding     #FEN   #Dysphagia  - Diet - Minced & Moist  [CC]    - Dysphaiga- SLP evaluation     #Health maintenance  - Folic Acid   - MVI  - Ocean nasal spray    # Difficulty with access to peripheral veins-- Blood draws from Left arm Medline     #Precautions / PROPHYLAXIS:   - Falls  - ortho: Weight bearing status: WBAT   - Lungs: Aspiration, Incentive Spirometer   - DVT PPX: Eliquis 5 mg BID   ---------------------------  *l abs 10/26--stable anemia, normal renal function and down trending LFT      Liaison with family  Patient's partner present during review, details of review d/w her, detailed explanation of therapy protocol explained and she was happy with same  10/9--Partner present during review and discussed details treatment plan with her    Liaison with providers  Consult recs form multiple specialities being implemented  Surgical consult and recs 10/9--agreed with plan for AC for Rt IJ chronic thrombus    10/10 --No response to multiple calls to patient'[facility] cardiologist Dr Otto Stratton  ph     ---------------------------  IDT conference on 10/24  Social Work: Await insurance extension/self pay.   SLP: --  OT: Total A for ADLs/transfers.   PT: Damaris for transfers. Amb 30ft with LiteGait.   RT: Continues to decline.  DME: TBD   Barriers: R groin wound care.  Functional level on DC: Max A for self care.   TDD: 11/1 to EMIGDIO.  ---------------------------  OUTPATIENT/FOLLOW UP:    Trae Whitney  Pulmonary Disease  [address], [address] [place] [address]  Phone: [phone]  Fax: [phone]  Follow Up Time: 2 weeks    Ryanne Allen  Rheumatology  [address] [address]  Phone: [phone]  Fax: [phone]  Follow Up Time: 1 week    Kyle Pierce  Internal Medicine  [address] [place] [address], [address]  [place] [address]  Phone: [phone]  Fax: [phone]  Follow Up Time: 2 weeks    Addy Powers  Pulmonary Disease  [address] [facility], [address] [id number]  Phone: [phone]  Fax: [phone]  Follow Up Time:     Kwaem Hawley  Critical Care Medicine  [address] [facility], [address] [address]  Phone: [phone]  Fax: [phone]  Follow Up Time:     Neena Borrego  Rheumatology  [address] [place] [address], [address]  [place] [address]  Phone: [phone]  Fax: [phone]  Follow Up Time:    [facility]  [facility] [address]  Scheduled Appointment: 09/13/2023  2:40 PM  ---------------------------

## 2023-10-26 NOTE — CHART NOTE - NSCHARTNOTEFT_GEN_A_CORE
Evaluated patient at bedside.  Wife was also present at bedside.  Discussed current methylprednisolone taper plan.  Patient had a missed dose from this morning.  Discussed giving the patient a stat dose now and then moving the next the schedule dose.  Explained the plans for 2 week incremental taper.  Patient oxygen saturations while in the room 91-93% on 3L NC.  Discussed increased pulse ox checks throughout the night with patient, patient's spouse and nursing.    Vital Signs Last 24 Hrs  T(C): 36.6 (25 Oct 2023 09:35), Max: 36.6 (25 Oct 2023 09:35)  T(F): 97.8 (25 Oct 2023 09:35), Max: 97.8 (25 Oct 2023 09:35)  HR: 95 (25 Oct 2023 18:14) (88 - 100)  BP: 111/73 (25 Oct 2023 18:14) (111/73 - 144/76)  BP(mean): --  RR: 16 (25 Oct 2023 09:35) (16 - 16)  SpO2: 97% (25 Oct 2023 09:35) (97% - 97%)    Parameters below as of 25 Oct 2023 09:35  Patient On (Oxygen Delivery Method): nasal cannula  O2 Flow (L/min): 3      A/P  ALIZA FOLEY is a 62 year old female with PMH of pAFIB and sciatica; who presented to [facility] ED with SOB, and was found to have groundglass opacities and interstitial lung disease. She was treated with empiric Vancomycin and Zosyn. She underwent a bronchoscopy with bronchoalveolar lavage and pulse steroids. She was given IV diuretics for increased pulmonary congestion, but her respiratory status continued to worsen.  On 9/13, she was transferred to [facility] for ECMO requirements. She was further treated with Plasmapheresis, Rituximab and high-dose steroids, with significant improvement. Her overall hospital course was complicated by thrombocytopenia (s/p several platelet transfusions), leukocytosis (infectious workup entirely negative- s/p Vanco and Ceftriaxone), AFIB with RVR (resolved with Metoprolol), dysphagia (requiring NGT), transaminitis (secondary to acute illness and hypotension),  non-occlusive RIJ DVT (started on Lovenox). Patient now admitted for a multidisciplinary rehab program. 10-05-23 @ 13:18    -Stat 56mg methylprednisolone dose to be given 10/26  -c/w with incremental taper as described in prior progress note  -Q1h pulse ox monitoring  -c/w with 3L NC to maintain O2 saturation >91%  -Primary team/Hospitalist to follow-up in AM

## 2023-10-26 NOTE — PROGRESS NOTE ADULT - SUBJECTIVE AND OBJECTIVE BOX
CC: Non-specific Interstitial Lung Disease     Today's Subjective & Objective Findings:  Patient seen and examined at bedside this AM.   Partner present.  Admits to sleeping well.   Last BM on 10/25, per patient.  Loose BMs have resolved.  Expressed frustration over steroid taper.  Brief episode of hypoxia overnight- resolved.   No other complaints at this time.     ROS --No dyspnea, head ache, chest or abd pain      Therapy-- engaging, motivated  Had some standing exercise today, Damaris lift, 3 person, max assist  Felt pressure on b/l groin, hence advised therapists to add b/l groin padding during subsequent stand up exercises or anytime straps are being applied to groin  Engaged in UE muscle strengthening  Will continue to engage for  E stim to upper extremity muscles       Vital Signs Last 24 Hrs  T(C): 36.7 (26 Oct 2023 08:47), Max: 37.1 (25 Oct 2023 21:00)  T(F): 98.1 (26 Oct 2023 08:47), Max: 98.8 (25 Oct 2023 21:00)  HR: 79 (26 Oct 2023 08:47) (79 - 96)  BP: 101/65 (26 Oct 2023 08:47) (101/65 - 122/74)  BP(mean): --  RR: 18 (26 Oct 2023 08:47) (16 - 18)  SpO2: 97% (26 Oct 2023 08:47) (89% - 98%)      PHYSICAL EXAM:  Gen -  Comfortable,   Sitting on a recliner chair at bed side,  HEENT - , nostrils are moist, no bleeding, mod icterus  Neck - Supple, No limited ROM, O2 via NC  Pulm - good   Cardiovascular - RRR, S1S2  Chest - good chest expansion,   Abdomen - Soft, non tender, +BS,   Extremities - No Cyanosis, no clubbing, 1+ edema to b/l feet, non pitting,    no calf tenderness, LUE Med line    Neuro-     Cognitive - awake, alert, fully oriented, engaging, speech normal      Motor -                    LEFT    UE - 4/5                    RIGHT UE - 4/5                     LEFT    LE - 2 +-/5 limited by leg edema                    RIGHT LE - 2+-/5  limitted by leg edema,, which is reducing      Sensory - Intact        Reflexes - DTR 1+      Coordination - FTN  impaired  due to weakness   MSK: soreness and weakness  Psychiatric - Mood stable, Affect WNL  Skin: Left lower abdomen ruptured blister 3.0 x 1.0, stage 2 pressure injury to right buttock 4 x1 and left buttock 3x1, stage 2 pressure injury ti right inner thigh 0.2 x 0.2, right groin wound 10.5 x 2.0, Left groin wound 5 x 5,      MMT--Able to contact B/L upper back muscles,   Elbow abduction and adduction, gravity eliminated 2+      LABS:                        9.3    5.07  )-----------( 207      ( 26 Oct 2023 07:16 )             30.0     10-26    138  |  102  |  13  ----------------------------<  166<H>  3.9   |  28  |  0.41<L>    Ca    8.8      26 Oct 2023 07:16    TPro  5.0<L>  /  Alb  2.1<L>  /  TBili  2.8<H>  /  DBili  x   /  AST  43<H>  /  ALT  78<H>  /  AlkPhos  274<H>  10-26      Urinalysis Basic - ( 26 Oct 2023 07:16 )    Color: x / Appearance: x / SG: x / pH: x  Gluc: 166 mg/dL / Ketone: x  / Bili: x / Urobili: x   Blood: x / Protein: x / Nitrite: x   Leuk Esterase: x / RBC: x / WBC x   Sq Epi: x / Non Sq Epi: x / Bacteria: x      < from: Xray Abdomen 1 View PORTABLE -Routine (Xray Abdomen 1 View PORTABLE -Routine .) (10.19.23 @ 09:04) >  COMPARISON: 9/19/2023    Frontal view of the abdomen shows a normal gas pattern. Moderate stool   burden is present. No definite free air is identified.    IMPRESSION:  No evidence of bowel obstruction.      MEDICATIONS  (STANDING):  apixaban 5 milliGRAM(s) Oral every 12 hours  artificial  tears Solution 1 Drop(s) Both EYES every 12 hours  ascorbic acid 500 milliGRAM(s) Oral daily  atovaquone  Suspension 1500 milliGRAM(s) Oral daily  dextrose 5%. 1000 milliLiter(s) (50 mL/Hr) IV Continuous <Continuous>  dextrose 5%. 1000 milliLiter(s) (100 mL/Hr) IV Continuous <Continuous>  dextrose 50% Injectable 12.5 Gram(s) IV Push once  dextrose 50% Injectable 25 Gram(s) IV Push once  dextrose 50% Injectable 25 Gram(s) IV Push once  folic acid 1 milliGRAM(s) Oral daily  gabapentin 300 milliGRAM(s) Oral at bedtime  glucagon  Injectable 1 milliGRAM(s) IntraMuscular once  hydrocortisone hemorrhoidal Suppository 1 Suppository(s) Rectal two times a day  influenza   Vaccine 0.5 milliLiter(s) IntraMuscular once  lactobacillus acidophilus 1 Tablet(s) Oral two times a day with meals  melatonin 6 milliGRAM(s) Oral at bedtime  methylPREDNISolone   Oral   methylPREDNISolone 56 milliGRAM(s) Oral daily  metoprolol tartrate 25 milliGRAM(s) Oral two times a day  multivitamin 1 Tablet(s) Oral daily  nystatin Powder 1 Application(s) Topical two times a day  pantoprazole    Tablet 40 milliGRAM(s) Oral before breakfast  psyllium Powder 1 Packet(s) Oral daily  senna 2 Tablet(s) Oral at bedtime  sodium chloride 0.9% lock flush 5 milliLiter(s) IV Push two times a day  zinc oxide 40% Paste 1 Application(s) Topical three times a day    MEDICATIONS  (PRN):  albuterol/ipratropium for Nebulization 3 milliLiter(s) Nebulizer every 6 hours PRN Shortness of Breath and/or Wheezing  aluminum hydroxide/magnesium hydroxide/simethicone Suspension 30 milliLiter(s) Oral every 4 hours PRN Dyspepsia  dextrose Oral Gel 15 Gram(s) Oral once PRN Blood Glucose LESS THAN 70 milliGRAM(s)/deciliter  loperamide 2 milliGRAM(s) Oral three times a day PRN Diarrhea  polyethylene glycol 3350 17 Gram(s) Oral daily PRN Constipation  SIMETHICONE SOFTGELS 250 milliGRAM(s) 250 milliGRAM(s) Oral three times a day PRN for gas  sodium chloride 0.65% Nasal 1 Spray(s) Both Nostrils every 8 hours PRN Nasal Congestion   CC: Non-specific Interstitial Lung Disease     Today's Subjective & Objective Findings:  Patient seen and examined at bedside this AM.   Partner present.  Still reporting tingling/numbness b/l feet, on gabapentin 200mg qhs, agreed to increased dose  Reports disturbed sleep due to an issue with NC oxy tubing, last night, and episodic hypoxia. Now resolved after issue with NC tubing resolved  She was unable to attend her AM therapy session due to fatigue, she agrees to a make up therapy session on either Sat or Sunday  Regarding missed dozes of tapering oral steroid.  Discussed with Pulmonary, they recommended to recommence tapering steroid at dose previously planned for the day. patient happy with decision  Pulse oxy meter check during review 95% on 1L oxy NC    ROS --No dyspnea, head ache, chest or abd pain LBM 10/25    Therapy-- engaging, motivated  Am therapy session rescheduled, to get a make up session as stated  Happy with the minimal progress with her UE motor function  Clinical notes to be sent, by , to health insurance seeking for an extension of acute rehab treatment    Vital Signs Last 24 Hrs  T(C): 36.7 (26 Oct 2023 08:47), Max: 37.1 (25 Oct 2023 21:00)  T(F): 98.1 (26 Oct 2023 08:47), Max: 98.8 (25 Oct 2023 21:00)  HR: 79 (26 Oct 2023 08:47) (79 - 96)  BP: 101/65 (26 Oct 2023 08:47) (101/65 - 122/74)  RR: 18 (26 Oct 2023 08:47) (16 - 18)  SpO2: 97% (26 Oct 2023 08:47) (89% - 98%)      PHYSICAL EXAM:  Gen -  Comfortable,   Lying in bed, in no acute distress IL NC oxy  HEENT - , nostrils are moist, no bleeding, mild icterus  Neck - Supple, No limited ROM, O2 via NC  Pulm - good   Cardiovascular - RRR, S1S2  Chest - good chest expansion,   Abdomen - Soft, non tender, +BS,   Extremities - No Cyanosis, no clubbing, 1+ edema to b/l feet, non pitting,    no calf tenderness, LUE Med line    Neuro-     Cognitive - awake, alert, fully oriented, engaging, speech normal      Motor -                    LEFT    UE - 4/5                    RIGHT UE - 4/5                     LEFT    LE - 2 +-/5 limited by leg edema                    RIGHT LE - 2+-/5  limitted by leg edema,, which is reducing      Sensory - Intact        Reflexes - DTR 1+      Coordination - FTN  impaired  due to weakness   MSK: soreness and weakness  Psychiatric - Mood stable, Affect WNL  Skin: Left lower abdomen ruptured blister 3.0 x 1.0, stage 2 pressure injury to right buttock 4 x1 and left buttock 3x1, stage 2 pressure injury ti right inner thigh 0.2 x 0.2, right groin wound 10.5 x 2.0, Left groin wound 5 x 5,    Hand swelling reducing    MMT--Able to contact B/L upper back muscles,   Elbow abduction and adduction, gravity eliminated 2+      LABS:                        9.3    5.07  )-----------( 207      ( 26 Oct 2023 07:16 )             30.0     10-26    138  |  102  |  13  ----------------------------<  166<H>  3.9   |  28  |  0.41<L>    Ca    8.8      26 Oct 2023 07:16    TPro  5.0<L>  /  Alb  2.1<L>  /  TBili  2.8<H>  /  DBili  x   /  AST  43<H>  /  ALT  78<H>  /  AlkPhos  274<H>  10-26      Urinalysis Basic - ( 26 Oct 2023 07:16 )    Color: x / Appearance: x / SG: x / pH: x  Gluc: 166 mg/dL / Ketone: x  / Bili: x / Urobili: x   Blood: x / Protein: x / Nitrite: x   Leuk Esterase: x / RBC: x / WBC x   Sq Epi: x / Non Sq Epi: x / Bacteria: x      < from: Xray Abdomen 1 View PORTABLE -Routine (Xray Abdomen 1 View PORTABLE -Routine .) (10.19.23 @ 09:04) >  COMPARISON: 9/19/2023    Frontal view of the abdomen shows a normal gas pattern. Moderate stool   burden is present. No definite free air is identified.    IMPRESSION:  No evidence of bowel obstruction.      MEDICATIONS  (STANDING):  apixaban 5 milliGRAM(s) Oral every 12 hours  artificial  tears Solution 1 Drop(s) Both EYES every 12 hours  ascorbic acid 500 milliGRAM(s) Oral daily  atovaquone  Suspension 1500 milliGRAM(s) Oral daily  dextrose 5%. 1000 milliLiter(s) (50 mL/Hr) IV Continuous <Continuous>  dextrose 5%. 1000 milliLiter(s) (100 mL/Hr) IV Continuous <Continuous>  dextrose 50% Injectable 12.5 Gram(s) IV Push once  dextrose 50% Injectable 25 Gram(s) IV Push once  dextrose 50% Injectable 25 Gram(s) IV Push once  folic acid 1 milliGRAM(s) Oral daily  gabapentin 300 milliGRAM(s) Oral at bedtime  glucagon  Injectable 1 milliGRAM(s) IntraMuscular once  hydrocortisone hemorrhoidal Suppository 1 Suppository(s) Rectal two times a day  influenza   Vaccine 0.5 milliLiter(s) IntraMuscular once  lactobacillus acidophilus 1 Tablet(s) Oral two times a day with meals  melatonin 6 milliGRAM(s) Oral at bedtime  methylPREDNISolone   Oral   methylPREDNISolone 56 milliGRAM(s) Oral daily  metoprolol tartrate 25 milliGRAM(s) Oral two times a day  multivitamin 1 Tablet(s) Oral daily  nystatin Powder 1 Application(s) Topical two times a day  pantoprazole    Tablet 40 milliGRAM(s) Oral before breakfast  psyllium Powder 1 Packet(s) Oral daily  senna 2 Tablet(s) Oral at bedtime  sodium chloride 0.9% lock flush 5 milliLiter(s) IV Push two times a day  zinc oxide 40% Paste 1 Application(s) Topical three times a day    MEDICATIONS  (PRN):  albuterol/ipratropium for Nebulization 3 milliLiter(s) Nebulizer every 6 hours PRN Shortness of Breath and/or Wheezing  aluminum hydroxide/magnesium hydroxide/simethicone Suspension 30 milliLiter(s) Oral every 4 hours PRN Dyspepsia  dextrose Oral Gel 15 Gram(s) Oral once PRN Blood Glucose LESS THAN 70 milliGRAM(s)/deciliter  loperamide 2 milliGRAM(s) Oral three times a day PRN Diarrhea  polyethylene glycol 3350 17 Gram(s) Oral daily PRN Constipation  SIMETHICONE SOFTGELS 250 milliGRAM(s) 250 milliGRAM(s) Oral three times a day PRN for gas  sodium chloride 0.65% Nasal 1 Spray(s) Both Nostrils every 8 hours PRN Nasal Congestion

## 2023-10-26 NOTE — PROGRESS NOTE ADULT - NS ATTEND AMEND GEN_ALL_CORE FT
Seen and examined, findings as noted, note revised    Events overnight noted, issue with NC oxy tubing and interval oxy desaturation, now resolved  Still has neuropathy of both feet    Tolerating diet and has regular BMs    Engaging in therapy,   Has made some progress with upper extremity function as noted during IDT 2 days prior  Some improvement of hand function and elbow strength/ROM    Labs unremarkable--LFT downtrending,anemia stable renal function normal    Continue therapy--focusing on ROM, Improving UE motor activity/ADLs   Increased gabapentin to 300mg qhs  Continue steroid taper  Make up therapy session as discussed  Await update from insurance on appeal for extension of acute rehab treatment   Patient and partner happy with the plan

## 2023-10-27 PROCEDURE — 99232 SBSQ HOSP IP/OBS MODERATE 35: CPT

## 2023-10-27 RX ADMIN — NYSTATIN CREAM 1 APPLICATION(S): 100000 CREAM TOPICAL at 06:15

## 2023-10-27 RX ADMIN — PANTOPRAZOLE SODIUM 40 MILLIGRAM(S): 20 TABLET, DELAYED RELEASE ORAL at 06:13

## 2023-10-27 RX ADMIN — Medication 1 TABLET(S): at 16:09

## 2023-10-27 RX ADMIN — APIXABAN 5 MILLIGRAM(S): 2.5 TABLET, FILM COATED ORAL at 06:13

## 2023-10-27 RX ADMIN — APIXABAN 5 MILLIGRAM(S): 2.5 TABLET, FILM COATED ORAL at 16:09

## 2023-10-27 RX ADMIN — ZINC OXIDE 1 APPLICATION(S): 200 OINTMENT TOPICAL at 06:13

## 2023-10-27 RX ADMIN — Medication 56 MILLIGRAM(S): at 06:24

## 2023-10-27 RX ADMIN — Medication 1 TABLET(S): at 08:03

## 2023-10-27 RX ADMIN — Medication 1 SPRAY(S): at 06:14

## 2023-10-27 RX ADMIN — Medication 500 MILLIGRAM(S): at 11:51

## 2023-10-27 RX ADMIN — Medication 1 DROP(S): at 06:11

## 2023-10-27 RX ADMIN — Medication 1 MILLIGRAM(S): at 11:51

## 2023-10-27 RX ADMIN — Medication 25 MILLIGRAM(S): at 06:13

## 2023-10-27 RX ADMIN — Medication 25 MILLIGRAM(S): at 16:08

## 2023-10-27 RX ADMIN — Medication 1 DROP(S): at 16:08

## 2023-10-27 RX ADMIN — ATOVAQUONE 1500 MILLIGRAM(S): 750 SUSPENSION ORAL at 16:09

## 2023-10-27 RX ADMIN — GABAPENTIN 300 MILLIGRAM(S): 400 CAPSULE ORAL at 22:45

## 2023-10-27 RX ADMIN — NYSTATIN CREAM 1 APPLICATION(S): 100000 CREAM TOPICAL at 16:11

## 2023-10-27 RX ADMIN — Medication 6 MILLIGRAM(S): at 22:46

## 2023-10-27 RX ADMIN — ZINC OXIDE 1 APPLICATION(S): 200 OINTMENT TOPICAL at 22:45

## 2023-10-27 RX ADMIN — Medication 1 TABLET(S): at 11:51

## 2023-10-27 RX ADMIN — SODIUM CHLORIDE 5 MILLILITER(S): 9 INJECTION INTRAMUSCULAR; INTRAVENOUS; SUBCUTANEOUS at 17:27

## 2023-10-27 NOTE — PROGRESS NOTE ADULT - SUBJECTIVE AND OBJECTIVE BOX
CC: Non-specific Interstitial Lung Disease     Today's Subjective & Objective Findings:  Patient seen and examined at bedside this AM.   Partner present.  Last BM on 10/27, per patient.   BL EF/EE improving.   Plan for weaning off 02. Will attempt therapy off 02 today.  No other complaints.     ROS --No dyspnea, head ache, chest or abd pain    Therapy-- engaging, motivated  Am therapy session rescheduled, to get a make up session as stated  Happy with the minimal progress with her UE motor function  Clinical notes to be sent, by SW, to health insurance seeking for an extension of acute rehab treatment      Vital Signs Last 24 Hrs  T(C): 36.7 (27 Oct 2023 09:20), Max: 36.8 (26 Oct 2023 20:38)  T(F): 98.1 (27 Oct 2023 09:20), Max: 98.3 (26 Oct 2023 20:38)  HR: 78 (27 Oct 2023 09:20) (78 - 84)  BP: 97/58 (27 Oct 2023 09:20) (97/58 - 133/76)  BP(mean): --  RR: 18 (27 Oct 2023 09:20) (18 - 18)  SpO2: 86% (27 Oct 2023 09:20) (86% - 99%)      PHYSICAL EXAM:  Gen -  Comfortable,   Lying in bed, in no acute distress [place] NC oxy  HEENT - , nostrils are moist, no bleeding, mild icterus  Neck - Supple, No limited ROM, O2 via NC  Pulm - good   Cardiovascular - RRR, S1S2  Chest - good chest expansion,   Abdomen - Soft, non tender, +BS,   Extremities - No Cyanosis, no clubbing, 1+ edema to b/l feet, non pitting,    no calf tenderness, LUE Med line    Neuro-     Cognitive - awake, alert, fully oriented, engaging, speech normal      Motor -                    LEFT    UE - 4/5                    RIGHT UE - 4/5                     LEFT    LE - 2 +-/5 limited by leg edema                    RIGHT LE - 2+-/5  limitted by leg edema,, which is reducing      Sensory - Intact        Reflexes - DTR 1+      Coordination - FTN  impaired  due to weakness   MSK: soreness and weakness  Psychiatric - Mood stable, Affect WNL  Skin: Left lower abdomen ruptured blister 3.0 x 1.0, stage 2 pressure injury to right buttock 4 x1 and left buttock 3x1, stage 2 pressure injury ti right inner thigh 0.2 x 0.2, right groin wound 10.5 x 2.0, Left groin wound 5 x 5,    Hand swelling reducing    MMT--Able to contact B/L upper back muscles,   Elbow abduction and adduction, gravity eliminated 2+      LABS:                        9.3    5.07  )-----------( 207      ( 26 Oct 2023 07:16 )             30.0     10-26    138  |  102  |  13  ----------------------------<  166<H>  3.9   |  28  |  0.41<L>    Ca    8.8      26 Oct 2023 07:16    TPro  5.0<L>  /  Alb  2.1<L>  /  TBili  2.8<H>  /  DBili  x   /  AST  43<H>  /  ALT  78<H>  /  AlkPhos  274<H>  10-26      Urinalysis Basic - ( 26 Oct 2023 07:16 )    Color: x / Appearance: x / SG: x / pH: x  Gluc: 166 mg/dL / Ketone: x  / Bili: x / Urobili: x   Blood: x / Protein: x / Nitrite: x   Leuk Esterase: x / RBC: x / WBC x   Sq Epi: x / Non Sq Epi: x / Bacteria: x      < from: Xray Abdomen 1 View PORTABLE -Routine (Xray Abdomen 1 View PORTABLE -Routine .) (10.19.23 @ 09:04) >  COMPARISON: 9/19/2023    Frontal view of the abdomen shows a normal gas pattern. Moderate stool   burden is present. No definite free air is identified.    IMPRESSION:  No evidence of bowel obstruction.        MEDICATIONS  (STANDING):  apixaban 5 milliGRAM(s) Oral every 12 hours  artificial  tears Solution 1 Drop(s) Both EYES every 12 hours  ascorbic acid 500 milliGRAM(s) Oral daily  atovaquone  Suspension 1500 milliGRAM(s) Oral daily  dextrose 5%. 1000 milliLiter(s) (50 mL/Hr) IV Continuous <Continuous>  dextrose 5%. 1000 milliLiter(s) (100 mL/Hr) IV Continuous <Continuous>  dextrose 50% Injectable 25 Gram(s) IV Push once  dextrose 50% Injectable 25 Gram(s) IV Push once  dextrose 50% Injectable 12.5 Gram(s) IV Push once  folic acid 1 milliGRAM(s) Oral daily  gabapentin 300 milliGRAM(s) Oral at bedtime  glucagon  Injectable 1 milliGRAM(s) IntraMuscular once  hydrocortisone hemorrhoidal Suppository 1 Suppository(s) Rectal two times a day  influenza   Vaccine 0.5 milliLiter(s) IntraMuscular once  lactobacillus acidophilus 1 Tablet(s) Oral two times a day with meals  melatonin 6 milliGRAM(s) Oral at bedtime  methylPREDNISolone 56 milliGRAM(s) Oral daily  methylPREDNISolone   Oral   metoprolol tartrate 25 milliGRAM(s) Oral two times a day  multivitamin 1 Tablet(s) Oral daily  nystatin Powder 1 Application(s) Topical two times a day  pantoprazole    Tablet 40 milliGRAM(s) Oral before breakfast  psyllium Powder 1 Packet(s) Oral daily  senna 2 Tablet(s) Oral at bedtime  sodium chloride 0.9% lock flush 5 milliLiter(s) IV Push two times a day  zinc oxide 40% Paste 1 Application(s) Topical three times a day    MEDICATIONS  (PRN):  albuterol/ipratropium for Nebulization 3 milliLiter(s) Nebulizer every 6 hours PRN Shortness of Breath and/or Wheezing  aluminum hydroxide/magnesium hydroxide/simethicone Suspension 30 milliLiter(s) Oral every 4 hours PRN Dyspepsia  dextrose Oral Gel 15 Gram(s) Oral once PRN Blood Glucose LESS THAN 70 milliGRAM(s)/deciliter  loperamide 2 milliGRAM(s) Oral three times a day PRN Diarrhea  polyethylene glycol 3350 17 Gram(s) Oral daily PRN Constipation  SIMETHICONE SOFTGELS 250 milliGRAM(s) 250 milliGRAM(s) Oral three times a day PRN for gas  sodium chloride 0.65% Nasal 1 Spray(s) Both Nostrils every 8 hours PRN Nasal Congestion

## 2023-10-27 NOTE — PROGRESS NOTE ADULT - ASSESSMENT
Assessment/Plan:  ALIZA FOLEY is a 62 year old female with PMH of pAFIB and sciatica; who presented to [facility] ED with SOB, and was found to have groundglass opacities and interstitial lung disease. She was treated with empiric Vancomycin and Zosyn. She underwent a bronchoscopy with bronchoalveolar lavage and pulse steroids. She was given IV diuretics for increased pulmonary congestion, but her respiratory status continued to worsen.  On 9/13, she was transferred to [facility] for ECMO requirements. She was further treated with Plasmapheresis, Rituximab and high-dose steroids, with significant improvement. Her overall hospital course was complicated by thrombocytopenia (s/p several platelet transfusions), leukocytosis (infectious workup entirely negative- s/p Vanco and Ceftriaxone), AFIB with RVR (resolved with Metoprolol), dysphagia (requiring NGT), transaminitis (secondary to acute illness and hypotension),  non-occlusive RIJ DVT (started on Lovenox). Patient now admitted for a multidisciplinary rehab program. 10-05-23 @ 13:18    * Wound care following  * Pulmonary team following - continue steroid taper  * Plan to wean off 02 as tolerated   * DC planning as already discussed, staring with appeal for extension of acute rehab stay duration (SW sending notes to insurance) and other options depending on rhe response      #Interstitial Lung Disease and Mixed connective tissue disease  - Gait Instability, ADL impairments and Functional impairments: start Comprehensive Rehab Program of PT/OT/SLP - 3 hours a day, 5 days a week  - P&O as needed   - Non-specific Interstitial Lung Disease  - Requiring ECMO   - Improvement s/p Plasmapheresis, Rituximab and high- dose steroids   - Albuterol/Ipratropium Nebulizer every 6 hours  - Mepron 1500mg daily  - PO Medrol ( due to liver dysfunction): Plan will be to taper by ~20% every 2 weeks  Medrol   64mg x 2 weeks   56mg x 2 weeks  44mg x 2 weeks   36mg x 2 weeks - Follow up Outpatient   -- Repeat CT Chest in 6 weeks   --Pulmonary team--f/u with patient and clarify when she can get immunizations    -Perform BL elbow flexion & elbow extension with antigravity in therapy  - Noted to have missed 5 days between steroid taper dosing- resumed at 56mg on 10/26- pulm recommends to continue as originally instructed   - Plan to wean 02 as tolerated     10/24--SW will apply to insurance for extension in few days and proceed with other options depending on response  F/U to earlier discussion on 1/018    #pAFIB/Rt Ij thrombus  - Metoprolol 25mg BID and lovenox  --- Eliquis 5mg BID - tolerating well     # Chronic Tinnitus   - ENT review and recs appreciate, Patient already made ENT appointment for f/u    # Lymphedema both legs -chronic and Rt IJ thrombus  - ACE Wrap BL LEs  - BL LE doppler negative for DVT  - BL UE doppler with chronic thrombotic changes in RIJ       #Transaminitis --LFT Down trending   - Secondary to acute illness and hypotension at [facility]  - Outpatient follow up     #Neuropathy  --continue gabapentin 10/16, dose increased 10/23 to 200mg qhs  --Work on motor strengthening, priority for UE as preferred by patient     #Sleep  - Melatonin 6mg at HS    #Skin  - Skin: Left lower abdomen ruptured blister 3.0 x 1.0, stage 2 pressure injury to right buttock 4 x1 and left buttock 3x1, stage 2 pressure injury ti right inner thigh 0.2 x 0.2, right groin wound 10.5 x 2.0, Left groin wound 5 x 5,  right neck scab wound  -- MAD to skin folds- Nystatin to submammary and abdominal pannus BID  -- MAD to L groin- cleanse with NS, Triad cream daily  -- Mad to R groin- Nystatin powder daily   -- R groin wound-- aquacel packing, Triad to periwound, Allevyn foam- daily and PRN   - Pressure injury/Skin: OOB to Chair, PT/OT   - Offload pressure, low air loss support surfaces, T&P every 2 hours   --Wound care consult-10/9 -Multiple wounds--perineal and buttock/sacral, recs appreciated   --Suggest Continue treatments as below:   Twice daily & PRN Normal Saline Cleanse of abdominal pannus & breast folds, perineal, Left & Right inner buttock erosions.  Pat thoroughly dry.   Apply Desitin generously twice daily (& PRN) to perineal, buttocks, and left groin erosions, leave open to air.    Apply Nystatin powder to abdominal pannus and breast folds.  Suggest use of Interdry cloths in folds to 'wick' moisture.  Suggest daily Normal Saline Cleanse of right groin surgical wound, pat dry.  Apply Cavillon film barrier to intact periwound skin.  Place Aquacell strip over open area, cover with foam dressing.  - Wound care following       #Pain Mgmt   - Tylenol PRN   - Gabapentin 300mg at HS     #GI/Bowel Mgmt   - Pantoprazole  - Senna  --on hold due to recent Loose BM  - PRN: Maalox   - Stool count  -  Lactobacillus BID   -- AB XR mod stool burden on transverse colon 10/23--still has mod stool burden, but moving bowel appropriately  - S/p enema and lactulose    # Alternating bowel function --commenced probiotic, latobacilus   * Leucocytosis probably steroid related and partly due to her Mixed Connective Tissue Disease, will continue monitoring     #/Bladder Mgmt --voiding     #FEN   #Dysphagia  - Diet - Minced & Moist  [CC]    - Dysphaiga- SLP evaluation     #Health maintenance  - Folic Acid   - MVI  - Ocean nasal spray    # Difficulty with access to peripheral veins-- Blood draws from Left arm Medline     #Precautions / PROPHYLAXIS:   - Falls  - ortho: Weight bearing status: WBAT   - Lungs: Aspiration, Incentive Spirometer   - DVT PPX: Eliquis 5 mg BID   ---------------------------  *l abs 10/26--stable anemia, normal renal function and down trending LFT      Liaison with family  Patient's partner present during review, details of review d/w her, detailed explanation of therapy protocol explained and she was happy with same  10/9--Partner present during review and discussed details treatment plan with her    Liaison with providers  Consult recs form multiple specialities being implemented  Surgical consult and recs 10/9--agreed with plan for AC for Rt IJ chronic thrombus    10/10 --No response to multiple calls to patient'srivate cardiologist Dr Otto Stratton  ph     ---------------------------  IDT conference on 10/24  Social Work: Await insurance extension/self pay.   SLP: --  OT: Total A for ADLs/transfers.   PT: Damaris for transfers. Amb 30ft with LiteGait.   RT: Continues to decline.  DME: TBD   Barriers: R groin wound care.  Functional level on DC: Max A for self care.   TDD: 11/1 to EMIGDIO.  ---------------------------  OUTPATIENT/FOLLOW UP:    Trae Whitney  Pulmonary Disease  [address], [address] [place] [address]  Phone: [phone]  Fax: [phone]  Follow Up Time: 2 weeks    Ryanne Allen  Rheumatology  [address] [address]  Phone: [phone]  Fax: [phone]  Follow Up Time: 1 week    Kyle Pierce [place]  Internal Medicine  [address] [place] [address], [address]  [place] [address]  Phone: [phone]  Fax: [phone]  Follow Up Time: 2 weeks    Addy Powers  Pulmonary Disease  [address] [facility], [address] [id number]  Phone: [phone]  Fax: [phone]  Follow Up Time:     Kwame Hawley [facility]  Critical Care Medicine  [address] [facility], [address]  [place] [address]  Phone: [phone]  Fax: [phone]  Follow Up Time:     Neena Borrego  Rheumatology  [address] [facility], [address]  [place] [address]  Phone: [phone]  Fax: [phone]  Follow Up Time:    [facility]  [facility] [address]  Scheduled Appointment: 09/13/2023  2:40 PM  ---------------------------

## 2023-10-28 ENCOUNTER — TRANSCRIPTION ENCOUNTER (OUTPATIENT)
Age: 63
End: 2023-10-28

## 2023-10-28 PROCEDURE — 99232 SBSQ HOSP IP/OBS MODERATE 35: CPT

## 2023-10-28 RX ADMIN — Medication 500 MILLIGRAM(S): at 12:43

## 2023-10-28 RX ADMIN — Medication 56 MILLIGRAM(S): at 06:53

## 2023-10-28 RX ADMIN — NYSTATIN CREAM 1 APPLICATION(S): 100000 CREAM TOPICAL at 17:19

## 2023-10-28 RX ADMIN — NYSTATIN CREAM 1 APPLICATION(S): 100000 CREAM TOPICAL at 06:53

## 2023-10-28 RX ADMIN — Medication 1 DROP(S): at 17:19

## 2023-10-28 RX ADMIN — Medication 6 MILLIGRAM(S): at 20:47

## 2023-10-28 RX ADMIN — PANTOPRAZOLE SODIUM 40 MILLIGRAM(S): 20 TABLET, DELAYED RELEASE ORAL at 06:54

## 2023-10-28 RX ADMIN — SODIUM CHLORIDE 5 MILLILITER(S): 9 INJECTION INTRAMUSCULAR; INTRAVENOUS; SUBCUTANEOUS at 07:29

## 2023-10-28 RX ADMIN — Medication 1 PACKET(S): at 12:42

## 2023-10-28 RX ADMIN — GABAPENTIN 300 MILLIGRAM(S): 400 CAPSULE ORAL at 20:47

## 2023-10-28 RX ADMIN — APIXABAN 5 MILLIGRAM(S): 2.5 TABLET, FILM COATED ORAL at 06:54

## 2023-10-28 RX ADMIN — Medication 1 MILLIGRAM(S): at 12:43

## 2023-10-28 RX ADMIN — Medication 25 MILLIGRAM(S): at 06:54

## 2023-10-28 RX ADMIN — Medication 1 DROP(S): at 06:54

## 2023-10-28 RX ADMIN — ZINC OXIDE 1 APPLICATION(S): 200 OINTMENT TOPICAL at 21:48

## 2023-10-28 RX ADMIN — APIXABAN 5 MILLIGRAM(S): 2.5 TABLET, FILM COATED ORAL at 17:20

## 2023-10-28 RX ADMIN — Medication 1 TABLET(S): at 12:43

## 2023-10-28 RX ADMIN — Medication 1 TABLET(S): at 08:10

## 2023-10-28 RX ADMIN — SODIUM CHLORIDE 5 MILLILITER(S): 9 INJECTION INTRAMUSCULAR; INTRAVENOUS; SUBCUTANEOUS at 17:00

## 2023-10-28 RX ADMIN — ZINC OXIDE 1 APPLICATION(S): 200 OINTMENT TOPICAL at 16:35

## 2023-10-28 RX ADMIN — Medication 1 TABLET(S): at 17:18

## 2023-10-28 RX ADMIN — ATOVAQUONE 1500 MILLIGRAM(S): 750 SUSPENSION ORAL at 12:42

## 2023-10-28 RX ADMIN — Medication 25 MILLIGRAM(S): at 17:19

## 2023-10-28 RX ADMIN — ZINC OXIDE 1 APPLICATION(S): 200 OINTMENT TOPICAL at 06:53

## 2023-10-28 NOTE — DISCHARGE NOTE NURSING/CASE MANAGEMENT/SOCIAL WORK - PATIENT PORTAL LINK FT
You can access the FollowMyHealth Patient Portal offered by [facility] by registering at the following website: [email or web address]. By joining [employer]’s FollowMyHealth portal, you will also be able to view your health information using other applications (apps) compatible with our system. You can access the FollowMyHealth Patient Portal offered by [facility] by registering at the following website: [email or web address]. By joining [employer]’s FollowMyHealth portal, you will also be able to view your health information using other applications (apps) compatible with our system. You can access the FollowMyHealth Patient Portal offered by [facility] by registering at the following website: [email or web address]. By joining [employer]’s FollowMyHealth portal, you will also be able to view your health information using other applications (apps) compatible with our system.

## 2023-10-28 NOTE — DISCHARGE NOTE NURSING/CASE MANAGEMENT/SOCIAL WORK - NSSCTYPOFSERV_GEN_ALL_CORE
You will be evaluated for [facility] services.  You will be evaluated for [facility] services.  You will be evaluated for [facility] services.

## 2023-10-28 NOTE — DISCHARGE NOTE NURSING/CASE MANAGEMENT/SOCIAL WORK - NSDCPEFALRISK_GEN_ALL_CORE
For information on Fall & Injury Prevention, visit: [email or web address]://www.[facility]/news/fall-prevention-protects-and-maintains-health-and-mobility OR  https://www.[facility]/news/fall-prevention-tips-to-avoid-injury OR  https://www.cdc.gov/steadi/patient.html For information on Fall & Injury Prevention, visit: https://www.[facility]/news/fall-prevention-protects-and-maintains-health-and-mobility OR  https://www.[facility]/news/fall-prevention-tips-to-avoid-injury OR  https://www.cdc.gov/steadi/patient.html For information on Fall & Injury Prevention, visit: https://www.[facility]/news/fall-prevention-protects-and-maintains-health-and-mobility OR  https://www.[facility]/news/fall-prevention-tips-to-avoid-injury OR  https://www.cdc.gov/steadi/patient.html

## 2023-10-28 NOTE — PROGRESS NOTE ADULT - SUBJECTIVE AND OBJECTIVE BOX
Chief complaint: no new complaints    Patient is a 62y old  Female who presents with a chief complaint of Interstitial Lung Disease (27 Oct 2023 09:50)        HEALTH ISSUES - PROBLEM Dx:  Epistaxis    Soft hoarse voice    Afib    Tinnitus    Excessive cerumen in ear canal                PAST MEDICAL & SURGICAL HISTORY:  Afib      Sciatica    VITALS  Vital Signs Last 24 Hrs  T(C): 36.8 (27 Oct 2023 19:00), Max: 36.8 (27 Oct 2023 19:00)  T(F): 98.2 (27 Oct 2023 19:00), Max: 98.2 (27 Oct 2023 19:00)  HR: 82 (28 Oct 2023 06:54) (76 - 82)  BP: 109/69 (28 Oct 2023 06:54) (108/66 - 113/65)  BP(mean): --  RR: 18 (27 Oct 2023 19:00) (18 - 18)  SpO2: 97% (27 Oct 2023 19:00) (97% - 98%)    Parameters below as of 27 Oct 2023 19:00  Patient On (Oxygen Delivery Method): nasal cannula          PHYSICAL EXAM  Constitutional - NAD, Comfortable  HEENT - NCAT, EOMI  Neck - Supple, No limited ROM  Chest - CTA bilaterally  Cardiovascular - RRR, S1S2  Abdomen - Soft, NTND  Extremities -  No calf tenderness            CURRENT MEDICATIONS    MEDICATIONS  (STANDING):  apixaban 5 milliGRAM(s) Oral every 12 hours  artificial  tears Solution 1 Drop(s) Both EYES every 12 hours  ascorbic acid 500 milliGRAM(s) Oral daily  atovaquone  Suspension 1500 milliGRAM(s) Oral daily  dextrose 5%. 1000 milliLiter(s) (50 mL/Hr) IV Continuous <Continuous>  dextrose 5%. 1000 milliLiter(s) (100 mL/Hr) IV Continuous <Continuous>  dextrose 50% Injectable 25 Gram(s) IV Push once  dextrose 50% Injectable 25 Gram(s) IV Push once  dextrose 50% Injectable 12.5 Gram(s) IV Push once  folic acid 1 milliGRAM(s) Oral daily  gabapentin 300 milliGRAM(s) Oral at bedtime  glucagon  Injectable 1 milliGRAM(s) IntraMuscular once  hydrocortisone hemorrhoidal Suppository 1 Suppository(s) Rectal two times a day  influenza   Vaccine 0.5 milliLiter(s) IntraMuscular once  lactobacillus acidophilus 1 Tablet(s) Oral two times a day with meals  melatonin 6 milliGRAM(s) Oral at bedtime  methylPREDNISolone 56 milliGRAM(s) Oral daily  methylPREDNISolone   Oral   metoprolol tartrate 25 milliGRAM(s) Oral two times a day  multivitamin 1 Tablet(s) Oral daily  nystatin Powder 1 Application(s) Topical two times a day  pantoprazole    Tablet 40 milliGRAM(s) Oral before breakfast  psyllium Powder 1 Packet(s) Oral daily  senna 2 Tablet(s) Oral at bedtime  sodium chloride 0.9% lock flush 5 milliLiter(s) IV Push two times a day  zinc oxide 40% Paste 1 Application(s) Topical three times a day    MEDICATIONS  (PRN):  albuterol/ipratropium for Nebulization 3 milliLiter(s) Nebulizer every 6 hours PRN Shortness of Breath and/or Wheezing  aluminum hydroxide/magnesium hydroxide/simethicone Suspension 30 milliLiter(s) Oral every 4 hours PRN Dyspepsia  dextrose Oral Gel 15 Gram(s) Oral once PRN Blood Glucose LESS THAN 70 milliGRAM(s)/deciliter  loperamide 2 milliGRAM(s) Oral three times a day PRN Diarrhea  polyethylene glycol 3350 17 Gram(s) Oral daily PRN Constipation  SIMETHICONE SOFTGELS 250 milliGRAM(s) 250 milliGRAM(s) Oral three times a day PRN for gas  sodium chloride 0.65% Nasal 1 Spray(s) Both Nostrils every 8 hours PRN Nasal Congestion    ASSESSMENT & PLAN          GI/Bowel Management    Management - Toilet Q2  Skin - Turn Q2  Pain - Tylenol PRN  DVT PPX - Apixaban       Continue comprehensive acute rehab program consisting of 3hrs/day of OT/PT and SLP.

## 2023-10-28 NOTE — DISCHARGE NOTE NURSING/CASE MANAGEMENT/SOCIAL WORK - CAREGIVER ADDRESS
[address] [facility] [address] [address] [facility] [address] [address] [facility] [address]

## 2023-10-28 NOTE — DISCHARGE NOTE NURSING/CASE MANAGEMENT/SOCIAL WORK - NSDCFUADDAPPT_GEN_ALL_CORE_FT
Please follow up with your PCP as soon as possible.    If you are in need of a PCP or a specialist in your area: contact the [facility] Physician Referral Service at [phone].  Please follow up with your PCP as soon as possible.    If you are in need of a PCP or a specialist in your area: contact the [facility] Physician Referral Service at [phone].  Please follow up with your PCP as soon as possible.    If you are in need of a PCP or a specialist in your area: contact the [facility] Physician Referral Service at [phone].  Please follow up with your PCP as soon as possible.  MD: Dr. Brissa Perez   PHONE: [phone]    If you are in need of a PCP or a specialist in your area: contact the [facility] Physician Referral Service at [phone].  Please follow up with your PCP as soon as possible.  MD: Dr. Brissa Perez   PHONE: [phone]    If you are in need of a PCP or a specialist in your area: contact the [facility] Physician Referral Service at [phone].  Please follow up with your PCP as soon as possible.  MD: Dr. Brissa Perez   PHONE: [phone]    If you are in need of a PCP or a specialist in your area: contact the [facility] Physician Referral Service at [phone].

## 2023-10-29 PROCEDURE — 99232 SBSQ HOSP IP/OBS MODERATE 35: CPT

## 2023-10-29 RX ADMIN — Medication 1 TABLET(S): at 16:09

## 2023-10-29 RX ADMIN — ATOVAQUONE 1500 MILLIGRAM(S): 750 SUSPENSION ORAL at 16:09

## 2023-10-29 RX ADMIN — Medication 1 TABLET(S): at 18:45

## 2023-10-29 RX ADMIN — GABAPENTIN 300 MILLIGRAM(S): 400 CAPSULE ORAL at 20:26

## 2023-10-29 RX ADMIN — NYSTATIN CREAM 1 APPLICATION(S): 100000 CREAM TOPICAL at 18:44

## 2023-10-29 RX ADMIN — Medication 25 MILLIGRAM(S): at 18:44

## 2023-10-29 RX ADMIN — Medication 1 TABLET(S): at 08:57

## 2023-10-29 RX ADMIN — APIXABAN 5 MILLIGRAM(S): 2.5 TABLET, FILM COATED ORAL at 18:45

## 2023-10-29 RX ADMIN — PANTOPRAZOLE SODIUM 40 MILLIGRAM(S): 20 TABLET, DELAYED RELEASE ORAL at 06:07

## 2023-10-29 RX ADMIN — Medication 1 MILLIGRAM(S): at 16:09

## 2023-10-29 RX ADMIN — ZINC OXIDE 1 APPLICATION(S): 200 OINTMENT TOPICAL at 06:08

## 2023-10-29 RX ADMIN — Medication 56 MILLIGRAM(S): at 06:07

## 2023-10-29 RX ADMIN — ZINC OXIDE 1 APPLICATION(S): 200 OINTMENT TOPICAL at 20:27

## 2023-10-29 RX ADMIN — APIXABAN 5 MILLIGRAM(S): 2.5 TABLET, FILM COATED ORAL at 06:06

## 2023-10-29 RX ADMIN — Medication 500 MILLIGRAM(S): at 16:10

## 2023-10-29 RX ADMIN — ZINC OXIDE 1 APPLICATION(S): 200 OINTMENT TOPICAL at 13:47

## 2023-10-29 RX ADMIN — Medication 6 MILLIGRAM(S): at 20:26

## 2023-10-29 RX ADMIN — SODIUM CHLORIDE 5 MILLILITER(S): 9 INJECTION INTRAMUSCULAR; INTRAVENOUS; SUBCUTANEOUS at 18:44

## 2023-10-29 RX ADMIN — Medication 25 MILLIGRAM(S): at 06:07

## 2023-10-29 RX ADMIN — NYSTATIN CREAM 1 APPLICATION(S): 100000 CREAM TOPICAL at 06:08

## 2023-10-29 NOTE — PROGRESS NOTE ADULT - SUBJECTIVE AND OBJECTIVE BOX
Chief complaint: no new complaints     Patient is a 62y old  Female who presents with a chief complaint of Interstitial Lung Disease (28 Oct 2023 13:20)        HEALTH ISSUES - PROBLEM Dx:  Epistaxis    Soft hoarse voice    Afib    Tinnitus    Excessive cerumen in ear canal         PAST MEDICAL & SURGICAL HISTORY:  Afib      Sciatica          VITALS  Vital Signs Last 24 Hrs  T(C): 36.7 (29 Oct 2023 10:12), Max: 36.7 (28 Oct 2023 20:32)  T(F): 98.1 (29 Oct 2023 10:12), Max: 98.1 (28 Oct 2023 20:32)  HR: 72 (29 Oct 2023 10:12) (72 - 98)  BP: 109/72 (29 Oct 2023 10:12) (106/65 - 111/69)  BP(mean): --  RR: 16 (29 Oct 2023 10:12) (15 - 18)  SpO2: 98% (29 Oct 2023 10:12) (91% - 98%)    Parameters below as of 29 Oct 2023 10:12  Patient On (Oxygen Delivery Method): room air          PHYSICAL EXAM  Constitutional - NAD, Comfortable  HEENT - NCAT, EOMI  Neck - Supple, No limited ROM  Chest - CTA bilaterally  Cardiovascular - RRR, S1S2  Abdomen - Soft, NTND  Extremities - No calf tenderness   Neurologic Exam -                    Cognitive - Awake, Alert     No new focal deficits                 CURRENT MEDICATIONS    MEDICATIONS  (STANDING):  apixaban 5 milliGRAM(s) Oral every 12 hours  artificial  tears Solution 1 Drop(s) Both EYES every 12 hours  ascorbic acid 500 milliGRAM(s) Oral daily  atovaquone  Suspension 1500 milliGRAM(s) Oral daily  dextrose 5%. 1000 milliLiter(s) (50 mL/Hr) IV Continuous <Continuous>  dextrose 5%. 1000 milliLiter(s) (100 mL/Hr) IV Continuous <Continuous>  dextrose 50% Injectable 12.5 Gram(s) IV Push once  dextrose 50% Injectable 25 Gram(s) IV Push once  dextrose 50% Injectable 25 Gram(s) IV Push once  folic acid 1 milliGRAM(s) Oral daily  gabapentin 300 milliGRAM(s) Oral at bedtime  glucagon  Injectable 1 milliGRAM(s) IntraMuscular once  hydrocortisone hemorrhoidal Suppository 1 Suppository(s) Rectal two times a day  influenza   Vaccine 0.5 milliLiter(s) IntraMuscular once  lactobacillus acidophilus 1 Tablet(s) Oral two times a day with meals  melatonin 6 milliGRAM(s) Oral at bedtime  methylPREDNISolone 56 milliGRAM(s) Oral daily  methylPREDNISolone   Oral   metoprolol tartrate 25 milliGRAM(s) Oral two times a day  multivitamin 1 Tablet(s) Oral daily  nystatin Powder 1 Application(s) Topical two times a day  pantoprazole    Tablet 40 milliGRAM(s) Oral before breakfast  psyllium Powder 1 Packet(s) Oral daily  senna 2 Tablet(s) Oral at bedtime  sodium chloride 0.9% lock flush 5 milliLiter(s) IV Push two times a day  zinc oxide 40% Paste 1 Application(s) Topical three times a day    MEDICATIONS  (PRN):  albuterol/ipratropium for Nebulization 3 milliLiter(s) Nebulizer every 6 hours PRN Shortness of Breath and/or Wheezing  aluminum hydroxide/magnesium hydroxide/simethicone Suspension 30 milliLiter(s) Oral every 4 hours PRN Dyspepsia  dextrose Oral Gel 15 Gram(s) Oral once PRN Blood Glucose LESS THAN 70 milliGRAM(s)/deciliter  loperamide 2 milliGRAM(s) Oral three times a day PRN Diarrhea  polyethylene glycol 3350 17 Gram(s) Oral daily PRN Constipation  SIMETHICONE SOFTGELS 250 milliGRAM(s) 250 milliGRAM(s) Oral three times a day PRN for gas  sodium chloride 0.65% Nasal 1 Spray(s) Both Nostrils every 8 hours PRN Nasal Congestion    ASSESSMENT & PLAN          GI/Bowel Management    Management - Toilet Q2  Skin - Turn Q2  Pain - Tylenol PRN  DVT PPX - Apixaban       Continue comprehensive acute rehab program consisting of 3hrs/day of OT/PT and SLP.

## 2023-10-30 DIAGNOSIS — J84.9 INTERSTITIAL PULMONARY DISEASE, UNSPECIFIED: ICD-10-CM

## 2023-10-30 DIAGNOSIS — I89.0 LYMPHEDEMA, NOT ELSEWHERE CLASSIFIED: ICD-10-CM

## 2023-10-30 DIAGNOSIS — E66.01 MORBID (SEVERE) OBESITY DUE TO EXCESS CALORIES: ICD-10-CM

## 2023-10-30 LAB
ALBUMIN SERPL ELPH-MCNC: 2.2 G/DL — LOW (ref 3.3–5)
ALBUMIN SERPL ELPH-MCNC: 2.2 G/DL — LOW (ref 3.3–5)
ALP SERPL-CCNC: 276 U/L — HIGH (ref 40–120)
ALP SERPL-CCNC: 276 U/L — HIGH (ref 40–120)
ALT FLD-CCNC: 80 U/L — HIGH (ref 10–45)
ALT FLD-CCNC: 80 U/L — HIGH (ref 10–45)
ANION GAP SERPL CALC-SCNC: 7 MMOL/L — SIGNIFICANT CHANGE UP (ref 5–17)
ANION GAP SERPL CALC-SCNC: 7 MMOL/L — SIGNIFICANT CHANGE UP (ref 5–17)
AST SERPL-CCNC: 53 U/L — HIGH (ref 10–40)
AST SERPL-CCNC: 53 U/L — HIGH (ref 10–40)
BASOPHILS # BLD AUTO: 0.02 K/UL — SIGNIFICANT CHANGE UP (ref 0–0.2)
BASOPHILS # BLD AUTO: 0.02 K/UL — SIGNIFICANT CHANGE UP (ref 0–0.2)
BASOPHILS NFR BLD AUTO: 0.3 % — SIGNIFICANT CHANGE UP (ref 0–2)
BASOPHILS NFR BLD AUTO: 0.3 % — SIGNIFICANT CHANGE UP (ref 0–2)
BILIRUB SERPL-MCNC: 1.5 MG/DL — HIGH (ref 0.2–1.2)
BILIRUB SERPL-MCNC: 1.5 MG/DL — HIGH (ref 0.2–1.2)
BUN SERPL-MCNC: 18 MG/DL — SIGNIFICANT CHANGE UP (ref 7–23)
BUN SERPL-MCNC: 18 MG/DL — SIGNIFICANT CHANGE UP (ref 7–23)
CALCIUM SERPL-MCNC: 9.2 MG/DL — SIGNIFICANT CHANGE UP (ref 8.4–10.5)
CALCIUM SERPL-MCNC: 9.2 MG/DL — SIGNIFICANT CHANGE UP (ref 8.4–10.5)
CHLORIDE SERPL-SCNC: 103 MMOL/L — SIGNIFICANT CHANGE UP (ref 96–108)
CHLORIDE SERPL-SCNC: 103 MMOL/L — SIGNIFICANT CHANGE UP (ref 96–108)
CO2 SERPL-SCNC: 28 MMOL/L — SIGNIFICANT CHANGE UP (ref 22–31)
CO2 SERPL-SCNC: 28 MMOL/L — SIGNIFICANT CHANGE UP (ref 22–31)
CREAT SERPL-MCNC: 0.36 MG/DL — LOW (ref 0.5–1.3)
CREAT SERPL-MCNC: 0.36 MG/DL — LOW (ref 0.5–1.3)
EGFR: 113 ML/MIN/1.73M2 — SIGNIFICANT CHANGE UP
EGFR: 113 ML/MIN/1.73M2 — SIGNIFICANT CHANGE UP
EOSINOPHIL # BLD AUTO: 0.01 K/UL — SIGNIFICANT CHANGE UP (ref 0–0.5)
EOSINOPHIL # BLD AUTO: 0.01 K/UL — SIGNIFICANT CHANGE UP (ref 0–0.5)
EOSINOPHIL NFR BLD AUTO: 0.1 % — SIGNIFICANT CHANGE UP (ref 0–6)
EOSINOPHIL NFR BLD AUTO: 0.1 % — SIGNIFICANT CHANGE UP (ref 0–6)
GLUCOSE SERPL-MCNC: 101 MG/DL — HIGH (ref 70–99)
GLUCOSE SERPL-MCNC: 101 MG/DL — HIGH (ref 70–99)
HCT VFR BLD CALC: 30.5 % — LOW (ref 34.5–45)
HCT VFR BLD CALC: 30.5 % — LOW (ref 34.5–45)
HGB BLD-MCNC: 9.5 G/DL — LOW (ref 11.5–15.5)
HGB BLD-MCNC: 9.5 G/DL — LOW (ref 11.5–15.5)
IMM GRANULOCYTES NFR BLD AUTO: 0.8 % — SIGNIFICANT CHANGE UP (ref 0–0.9)
IMM GRANULOCYTES NFR BLD AUTO: 0.8 % — SIGNIFICANT CHANGE UP (ref 0–0.9)
LYMPHOCYTES # BLD AUTO: 0.73 K/UL — LOW (ref 1–3.3)
LYMPHOCYTES # BLD AUTO: 0.73 K/UL — LOW (ref 1–3.3)
LYMPHOCYTES # BLD AUTO: 9.6 % — LOW (ref 13–44)
LYMPHOCYTES # BLD AUTO: 9.6 % — LOW (ref 13–44)
MCHC RBC-ENTMCNC: 31 PG — SIGNIFICANT CHANGE UP (ref 27–34)
MCHC RBC-ENTMCNC: 31 PG — SIGNIFICANT CHANGE UP (ref 27–34)
MCHC RBC-ENTMCNC: 31.1 GM/DL — LOW (ref 32–36)
MCHC RBC-ENTMCNC: 31.1 GM/DL — LOW (ref 32–36)
MCV RBC AUTO: 99.7 FL — SIGNIFICANT CHANGE UP (ref 80–100)
MCV RBC AUTO: 99.7 FL — SIGNIFICANT CHANGE UP (ref 80–100)
MONOCYTES # BLD AUTO: 0.59 K/UL — SIGNIFICANT CHANGE UP (ref 0–0.9)
MONOCYTES # BLD AUTO: 0.59 K/UL — SIGNIFICANT CHANGE UP (ref 0–0.9)
MONOCYTES NFR BLD AUTO: 7.7 % — SIGNIFICANT CHANGE UP (ref 2–14)
MONOCYTES NFR BLD AUTO: 7.7 % — SIGNIFICANT CHANGE UP (ref 2–14)
NEUTROPHILS # BLD AUTO: 6.21 K/UL — SIGNIFICANT CHANGE UP (ref 1.8–7.4)
NEUTROPHILS # BLD AUTO: 6.21 K/UL — SIGNIFICANT CHANGE UP (ref 1.8–7.4)
NEUTROPHILS NFR BLD AUTO: 81.5 % — HIGH (ref 43–77)
NEUTROPHILS NFR BLD AUTO: 81.5 % — HIGH (ref 43–77)
NRBC # BLD: 0 /100 WBCS — SIGNIFICANT CHANGE UP (ref 0–0)
NRBC # BLD: 0 /100 WBCS — SIGNIFICANT CHANGE UP (ref 0–0)
PLATELET # BLD AUTO: 268 K/UL — SIGNIFICANT CHANGE UP (ref 150–400)
PLATELET # BLD AUTO: 268 K/UL — SIGNIFICANT CHANGE UP (ref 150–400)
POTASSIUM SERPL-MCNC: 3.5 MMOL/L — SIGNIFICANT CHANGE UP (ref 3.5–5.3)
POTASSIUM SERPL-MCNC: 3.5 MMOL/L — SIGNIFICANT CHANGE UP (ref 3.5–5.3)
POTASSIUM SERPL-SCNC: 3.5 MMOL/L — SIGNIFICANT CHANGE UP (ref 3.5–5.3)
POTASSIUM SERPL-SCNC: 3.5 MMOL/L — SIGNIFICANT CHANGE UP (ref 3.5–5.3)
PROT SERPL-MCNC: 4.9 G/DL — LOW (ref 6–8.3)
PROT SERPL-MCNC: 4.9 G/DL — LOW (ref 6–8.3)
RBC # BLD: 3.06 M/UL — LOW (ref 3.8–5.2)
RBC # BLD: 3.06 M/UL — LOW (ref 3.8–5.2)
RBC # FLD: 15.7 % — HIGH (ref 10.3–14.5)
RBC # FLD: 15.7 % — HIGH (ref 10.3–14.5)
SODIUM SERPL-SCNC: 138 MMOL/L — SIGNIFICANT CHANGE UP (ref 135–145)
SODIUM SERPL-SCNC: 138 MMOL/L — SIGNIFICANT CHANGE UP (ref 135–145)
WBC # BLD: 7.62 K/UL — SIGNIFICANT CHANGE UP (ref 3.8–10.5)
WBC # BLD: 7.62 K/UL — SIGNIFICANT CHANGE UP (ref 3.8–10.5)
WBC # FLD AUTO: 7.62 K/UL — SIGNIFICANT CHANGE UP (ref 3.8–10.5)
WBC # FLD AUTO: 7.62 K/UL — SIGNIFICANT CHANGE UP (ref 3.8–10.5)

## 2023-10-30 PROCEDURE — 90791 PSYCH DIAGNOSTIC EVALUATION: CPT

## 2023-10-30 PROCEDURE — 99233 SBSQ HOSP IP/OBS HIGH 50: CPT

## 2023-10-30 RX ORDER — ALPRAZOLAM 0.25 MG
0.25 TABLET ORAL EVERY 6 HOURS
Refills: 0 | Status: DISCONTINUED | OUTPATIENT
Start: 2023-10-30 | End: 2023-11-03

## 2023-10-30 RX ADMIN — NYSTATIN CREAM 1 APPLICATION(S): 100000 CREAM TOPICAL at 18:26

## 2023-10-30 RX ADMIN — ZINC OXIDE 1 APPLICATION(S): 200 OINTMENT TOPICAL at 14:39

## 2023-10-30 RX ADMIN — Medication 1 DROP(S): at 06:35

## 2023-10-30 RX ADMIN — Medication 1 MILLIGRAM(S): at 11:49

## 2023-10-30 RX ADMIN — GABAPENTIN 300 MILLIGRAM(S): 400 CAPSULE ORAL at 22:39

## 2023-10-30 RX ADMIN — ZINC OXIDE 1 APPLICATION(S): 200 OINTMENT TOPICAL at 06:34

## 2023-10-30 RX ADMIN — Medication 1 TABLET(S): at 18:24

## 2023-10-30 RX ADMIN — SODIUM CHLORIDE 5 MILLILITER(S): 9 INJECTION INTRAMUSCULAR; INTRAVENOUS; SUBCUTANEOUS at 18:26

## 2023-10-30 RX ADMIN — APIXABAN 5 MILLIGRAM(S): 2.5 TABLET, FILM COATED ORAL at 19:48

## 2023-10-30 RX ADMIN — PANTOPRAZOLE SODIUM 40 MILLIGRAM(S): 20 TABLET, DELAYED RELEASE ORAL at 06:34

## 2023-10-30 RX ADMIN — Medication 25 MILLIGRAM(S): at 06:34

## 2023-10-30 RX ADMIN — APIXABAN 5 MILLIGRAM(S): 2.5 TABLET, FILM COATED ORAL at 06:34

## 2023-10-30 RX ADMIN — Medication 1 TABLET(S): at 08:54

## 2023-10-30 RX ADMIN — ATOVAQUONE 1500 MILLIGRAM(S): 750 SUSPENSION ORAL at 11:47

## 2023-10-30 RX ADMIN — Medication 6 MILLIGRAM(S): at 22:39

## 2023-10-30 RX ADMIN — Medication 1 TABLET(S): at 11:47

## 2023-10-30 RX ADMIN — Medication 500 MILLIGRAM(S): at 11:49

## 2023-10-30 RX ADMIN — Medication 56 MILLIGRAM(S): at 06:34

## 2023-10-30 RX ADMIN — SODIUM CHLORIDE 5 MILLILITER(S): 9 INJECTION INTRAMUSCULAR; INTRAVENOUS; SUBCUTANEOUS at 06:46

## 2023-10-30 RX ADMIN — NYSTATIN CREAM 1 APPLICATION(S): 100000 CREAM TOPICAL at 06:35

## 2023-10-30 RX ADMIN — Medication 25 MILLIGRAM(S): at 19:48

## 2023-10-30 NOTE — BH CONSULTATION LIAISON ASSESSMENT NOTE - NSBHCHARTREVIEWINVESTIGATE_PSY_A_CORE FT
< from: 12 Lead ECG (09.12.23 @ 02:27) >    Ventricular Rate 89 BPM    Atrial Rate 89 BPM    P-R Interval 140 ms    QRS Duration 74 ms    Q-T Interval 326 ms    QTC Calculation(Bazett) 396 ms    P Axis 54 degrees    R Axis 45 degrees    T Axis 24 degrees    Diagnosis Line Normal sinus rhythm  Low voltage QRS      Confirmed by AMPARO MORALES NEIL ([id number]) on 9/21/2023 4:23:01 PM    < end of copied text >

## 2023-10-30 NOTE — BH CONSULTATION LIAISON ASSESSMENT NOTE - DESCRIPTION
Pt states she has been with her partner for 24 years, reports downsizing her living space, and that she had pets , but they .   Pt reports negotiating contracts while being hospitalized.

## 2023-10-30 NOTE — BH CONSULTATION LIAISON ASSESSMENT NOTE - RISK ASSESSMENT
Pt with several protective factors - no access to weapons, her outlook on life is one where is a problem solver, generates solutions rather than asks  for help.   Pt has a stable relationship , and is involved in work and has no particular psychiatric history, as well as being able to identify things she enjoys like, travel, drawing, art, poetry.   The risk factor would be if she got the sense she would not regain being able to ambulate independently.

## 2023-10-30 NOTE — BH CONSULTATION LIAISON ASSESSMENT NOTE - NSBHMSERECMEM_PSY_A_CORE
Upon entering the room pt explained her current health status and what her previous hospital stays consisted of./Normal

## 2023-10-30 NOTE — PROGRESS NOTE ADULT - SUBJECTIVE AND OBJECTIVE BOX
CC: Non-specific Interstitial Lung Disease     Today's Subjective & Objective Findings:  Patient seen and examined at bedside in therapy this AM with Dr. JACOB   Partner present.  Last BM on 10/27, per patient.   BL EF/EE improving.   Plan for weaning off 02. Will attempt therapy off 02 today.  No other complaints.     ROS --No dyspnea, head ache, chest or abd pain    Therapy-- engaging, motivated  Am therapy session rescheduled, to get a make up session as stated  Happy with the minimal progress with her UE motor function  Clinical notes to be sent, by SW, to health insurance seeking for an extension of acute rehab treatment      Vital Signs Last 24 Hrs  T(C): 36.6 (29 Oct 2023 19:35), Max: 36.6 (29 Oct 2023 19:35)  T(F): 97.9 (29 Oct 2023 19:35), Max: 97.9 (29 Oct 2023 19:35)  HR: 76 (30 Oct 2023 06:35) (76 - 78)  BP: 124/74 (30 Oct 2023 06:35) (109/64 - 124/74)  BP(mean): --  RR: 16 (29 Oct 2023 19:35) (16 - 16)  SpO2: 98% (29 Oct 2023 19:35) (98% - 98%)      PHYSICAL EXAM:  Gen -  Comfortable,   Lying in bed, in no acute distress IL NC oxy  HEENT - , nostrils are moist, no bleeding, mild icterus  Neck - Supple, No limited ROM, O2 via NC  Pulm - good   Cardiovascular - RRR, S1S2  Chest - good chest expansion,   Abdomen - Soft, non tender, +BS,   Extremities - No Cyanosis, no clubbing, 1+ edema to b/l feet, non pitting,    no calf tenderness, LUE Med line    Neuro-     Cognitive - awake, alert, fully oriented, engaging, speech normal      Motor -                    LEFT    UE - 4/5                    RIGHT UE - 4/5                     LEFT    LE - 2 +-/5 limited by leg edema                    RIGHT LE - 2+-/5  limitted by leg edema,, which is reducing      Sensory - Intact        Reflexes - DTR 1+      Coordination - FTN  impaired  due to weakness   MSK: soreness and weakness  Psychiatric - Mood stable, Affect WNL  Skin: Left lower abdomen ruptured blister 3.0 x 1.0, stage 2 pressure injury to right buttock 4 x1 and left buttock 3x1, stage 2 pressure injury ti right inner thigh 0.2 x 0.2, right groin wound 10.5 x 2.0, Left groin wound 5 x 5,    Hand swelling reducing    MMT--Able to contact B/L upper back muscles,   Elbow abduction and adduction, gravity eliminated 2+      LABS:                        9.5    7.62  )-----------( 268      ( 30 Oct 2023 06:30 )             30.5     10-30    138  |  103  |  18  ----------------------------<  101<H>  3.5   |  28  |  0.36<L>    Ca    9.2      30 Oct 2023 06:30    TPro  4.9<L>  /  Alb  2.2<L>  /  TBili  1.5<H>  /  DBili  x   /  AST  53<H>  /  ALT  80<H>  /  AlkPhos  276<H>  10-30      Urinalysis Basic - ( 30 Oct 2023 06:30 )    Color: x / Appearance: x / SG: x / pH: x  Gluc: 101 mg/dL / Ketone: x  / Bili: x / Urobili: x   Blood: x / Protein: x / Nitrite: x   Leuk Esterase: x / RBC: x / WBC x   Sq Epi: x / Non Sq Epi: x / Bacteria: x        MEDICATIONS  (STANDING):  apixaban 5 milliGRAM(s) Oral every 12 hours  artificial  tears Solution 1 Drop(s) Both EYES every 12 hours  ascorbic acid 500 milliGRAM(s) Oral daily  atovaquone  Suspension 1500 milliGRAM(s) Oral daily  dextrose 5%. 1000 milliLiter(s) (50 mL/Hr) IV Continuous <Continuous>  dextrose 5%. 1000 milliLiter(s) (100 mL/Hr) IV Continuous <Continuous>  dextrose 50% Injectable 12.5 Gram(s) IV Push once  dextrose 50% Injectable 25 Gram(s) IV Push once  dextrose 50% Injectable 25 Gram(s) IV Push once  folic acid 1 milliGRAM(s) Oral daily  gabapentin 300 milliGRAM(s) Oral at bedtime  glucagon  Injectable 1 milliGRAM(s) IntraMuscular once  hydrocortisone hemorrhoidal Suppository 1 Suppository(s) Rectal two times a day  influenza   Vaccine 0.5 milliLiter(s) IntraMuscular once  lactobacillus acidophilus 1 Tablet(s) Oral two times a day with meals  melatonin 6 milliGRAM(s) Oral at bedtime  methylPREDNISolone 56 milliGRAM(s) Oral daily  methylPREDNISolone   Oral   metoprolol tartrate 25 milliGRAM(s) Oral two times a day  multivitamin 1 Tablet(s) Oral daily  nystatin Powder 1 Application(s) Topical two times a day  pantoprazole    Tablet 40 milliGRAM(s) Oral before breakfast  psyllium Powder 1 Packet(s) Oral daily  senna 2 Tablet(s) Oral at bedtime  sodium chloride 0.9% lock flush 5 milliLiter(s) IV Push two times a day  zinc oxide 40% Paste 1 Application(s) Topical three times a day    MEDICATIONS  (PRN):  albuterol/ipratropium for Nebulization 3 milliLiter(s) Nebulizer every 6 hours PRN Shortness of Breath and/or Wheezing  aluminum hydroxide/magnesium hydroxide/simethicone Suspension 30 milliLiter(s) Oral every 4 hours PRN Dyspepsia  dextrose Oral Gel 15 Gram(s) Oral once PRN Blood Glucose LESS THAN 70 milliGRAM(s)/deciliter  loperamide 2 milliGRAM(s) Oral three times a day PRN Diarrhea  polyethylene glycol 3350 17 Gram(s) Oral daily PRN Constipation  SIMETHICONE SOFTGELS 250 milliGRAM(s) 250 milliGRAM(s) Oral three times a day PRN for gas  sodium chloride 0.65% Nasal 1 Spray(s) Both Nostrils every 8 hours PRN Nasal Congestion   CC: Non-specific Interstitial Lung Disease     Today's Subjective & Objective Findings:  Patient seen and examined at bedside in therapy this AM with Dr. CECIL STILL on 10/29, per patient.   BL EF/EE improving.   Weaning off 02 as tolerated.  Pt wants morning medications to start at 8AM.   No other complaints.     ROS --No dyspnea, head ache, chest or abd pain    Therapy-- engaging, motivated  Am therapy session rescheduled, to get a make up session as stated  Happy with the minimal progress with her UE motor function  Clinical notes to be sent, by SW, to health insurance seeking for an extension of acute rehab treatment      Vital Signs Last 24 Hrs  T(C): 36.6 (29 Oct 2023 19:35), Max: 36.6 (29 Oct 2023 19:35)  T(F): 97.9 (29 Oct 2023 19:35), Max: 97.9 (29 Oct 2023 19:35)  HR: 76 (30 Oct 2023 06:35) (76 - 78)  BP: 124/74 (30 Oct 2023 06:35) (109/64 - 124/74)  BP(mean): --  RR: 16 (29 Oct 2023 19:35) (16 - 16)  SpO2: 98% (29 Oct 2023 19:35) (98% - 98%)      PHYSICAL EXAM:  Gen -  Comfortable,   Lying in bed, in no acute distress IL NC oxy  HEENT - , nostrils are moist, no bleeding, mild icterus  Neck - Supple, No limited ROM, O2 via NC  Pulm - good   Cardiovascular - RRR, S1S2  Chest - good chest expansion,   Abdomen - Soft, non tender, +BS,   Extremities - No Cyanosis, no clubbing, 1+ edema to b/l feet, non pitting,    no calf tenderness, LUE Med line    Neuro-     Cognitive - awake, alert, fully oriented, engaging, speech normal      Motor -                    LEFT    UE - 4/5                    RIGHT UE - 4/5                     LEFT    LE - 2 +-/5 limited by leg edema                    RIGHT LE - 2+-/5  limitted by leg edema,, which is reducing      Sensory - Intact        Reflexes - DTR 1+      Coordination - FTN  impaired  due to weakness   MSK: soreness and weakness  Psychiatric - Mood stable, Affect WNL  Skin: Left lower abdomen ruptured blister 3.0 x 1.0, stage 2 pressure injury to right buttock 4 x1 and left buttock 3x1, stage 2 pressure injury ti right inner thigh 0.2 x 0.2, right groin wound 10.5 x 2.0, Left groin wound 5 x 5,    Hand swelling reducing    MMT--Able to contact B/L upper back muscles,   Elbow abduction and adduction, gravity eliminated 2+      LABS:                        9.5    7.62  )-----------( 268      ( 30 Oct 2023 06:30 )             30.5     10-30    138  |  103  |  18  ----------------------------<  101<H>  3.5   |  28  |  0.36<L>    Ca    9.2      30 Oct 2023 06:30    TPro  4.9<L>  /  Alb  2.2<L>  /  TBili  1.5<H>  /  DBili  x   /  AST  53<H>  /  ALT  80<H>  /  AlkPhos  276<H>  10-30      Urinalysis Basic - ( 30 Oct 2023 06:30 )    Color: x / Appearance: x / SG: x / pH: x  Gluc: 101 mg/dL / Ketone: x  / Bili: x / Urobili: x   Blood: x / Protein: x / Nitrite: x   Leuk Esterase: x / RBC: x / WBC x   Sq Epi: x / Non Sq Epi: x / Bacteria: x        MEDICATIONS  (STANDING):  apixaban 5 milliGRAM(s) Oral every 12 hours  artificial  tears Solution 1 Drop(s) Both EYES every 12 hours  ascorbic acid 500 milliGRAM(s) Oral daily  atovaquone  Suspension 1500 milliGRAM(s) Oral daily  dextrose 5%. 1000 milliLiter(s) (50 mL/Hr) IV Continuous <Continuous>  dextrose 5%. 1000 milliLiter(s) (100 mL/Hr) IV Continuous <Continuous>  dextrose 50% Injectable 12.5 Gram(s) IV Push once  dextrose 50% Injectable 25 Gram(s) IV Push once  dextrose 50% Injectable 25 Gram(s) IV Push once  folic acid 1 milliGRAM(s) Oral daily  gabapentin 300 milliGRAM(s) Oral at bedtime  glucagon  Injectable 1 milliGRAM(s) IntraMuscular once  hydrocortisone hemorrhoidal Suppository 1 Suppository(s) Rectal two times a day  influenza   Vaccine 0.5 milliLiter(s) IntraMuscular once  lactobacillus acidophilus 1 Tablet(s) Oral two times a day with meals  melatonin 6 milliGRAM(s) Oral at bedtime  methylPREDNISolone 56 milliGRAM(s) Oral daily  methylPREDNISolone   Oral   metoprolol tartrate 25 milliGRAM(s) Oral two times a day  multivitamin 1 Tablet(s) Oral daily  nystatin Powder 1 Application(s) Topical two times a day  pantoprazole    Tablet 40 milliGRAM(s) Oral before breakfast  psyllium Powder 1 Packet(s) Oral daily  senna 2 Tablet(s) Oral at bedtime  sodium chloride 0.9% lock flush 5 milliLiter(s) IV Push two times a day  zinc oxide 40% Paste 1 Application(s) Topical three times a day    MEDICATIONS  (PRN):  albuterol/ipratropium for Nebulization 3 milliLiter(s) Nebulizer every 6 hours PRN Shortness of Breath and/or Wheezing  aluminum hydroxide/magnesium hydroxide/simethicone Suspension 30 milliLiter(s) Oral every 4 hours PRN Dyspepsia  dextrose Oral Gel 15 Gram(s) Oral once PRN Blood Glucose LESS THAN 70 milliGRAM(s)/deciliter  loperamide 2 milliGRAM(s) Oral three times a day PRN Diarrhea  polyethylene glycol 3350 17 Gram(s) Oral daily PRN Constipation  SIMETHICONE SOFTGELS 250 milliGRAM(s) 250 milliGRAM(s) Oral three times a day PRN for gas  sodium chloride 0.65% Nasal 1 Spray(s) Both Nostrils every 8 hours PRN Nasal Congestion   CC: Non-specific Interstitial Lung Disease     Today's Subjective & Objective Findings:  Patient seen and examined, observed in therapy this AM with Dr. López   Last BM on 10/29,   BL EF/EE improving.   Weaning off 02 as being tolerated, up to 1-2hr at rest during the day,   Pt wants morning medications to start at 8AM.   Patient asked if she could be allowed to get therapy during the week end, on a private pay basis.  We told her that we will ask management   No other complaints.     ROS --No dyspnea, head ache, chest or abd pain    Therapy-- engaging, motivated  Am therapy session rescheduled, to get a make up session as stated  Happy with the minimal progress with her UE motor function  Clinical notes to be sent, by , to health insurance seeking for an extension of acute rehab treatment  Improvement with UE noted, able to flex elbow and touch lips with fingers, gravity eliminated      Vital Signs Last 24 Hrs  T(C): 36.6 (29 Oct 2023 19:35), Max: 36.6 (29 Oct 2023 19:35)  T(F): 97.9 (29 Oct 2023 19:35), Max: 97.9 (29 Oct 2023 19:35)  HR: 76 (30 Oct 2023 06:35) (76 - 78)  BP: 124/74 (30 Oct 2023 06:35) (109/64 - 124/74)  RR: 16 (29 Oct 2023 19:35) (16 - 16)  SpO2: 98% (29 Oct 2023 19:35) (98% - 98%)      PHYSICAL EXAM:  Gen -  Comfortable,   In no acute distress IL NC oxy  HEENT - , nostrils are moist, no bleeding, mild icterus  Neck - Supple, No limited ROM, O2 via NC  Pulm - good   Cardiovascular - RRR, S1S2  Chest - good chest expansion,   Abdomen - Soft, non tender, +BS,   Extremities - No Cyanosis, no clubbing, 1+ edema to b/l feet, non pitting,    no calf tenderness, LUE Med line    Neuro-     Cognitive - awake, alert, fully oriented, engaging, speech normal      Motor -                    LEFT    UE - 4/5                    RIGHT UE - 4/5                     LEFT    LE - 2 +-/5 limited by leg edema                    RIGHT LE - 2+-/5  limitted by leg edema,, which is reducing      Sensory - Intact        Reflexes - DTR 1+      Coordination - FTN  impaired  due to weakness   MSK: soreness and weakness  Psychiatric - Mood stable, Affect WNL  Skin: Left lower abdomen ruptured blister 3.0 x 1.0, stage 2 pressure injury to right buttock 4 x1 and left buttock 3x1, stage 2 pressure injury ti right inner thigh 0.2 x 0.2, right groin wound 10.5 x 2.0, Left groin wound 5 x 5,    Hand swelling reducing    MMT--Able to contact B/L upper back muscles,   Elbow abduction and adduction, gravity eliminated 2+      LABS:                        9.5    7.62  )-----------( 268      ( 30 Oct 2023 06:30 )             30.5     10-30    138  |  103  |  18  ----------------------------<  101<H>  3.5   |  28  |  0.36<L>    Ca    9.2      30 Oct 2023 06:30    TPro  4.9<L>  /  Alb  2.2<L>  /  TBili  1.5<H>  /  DBili  x   /  AST  53<H>  /  ALT  80<H>  /  AlkPhos  276<H>  10-30      Urinalysis Basic - ( 30 Oct 2023 06:30 )    Color: x / Appearance: x / SG: x / pH: x  Gluc: 101 mg/dL / Ketone: x  / Bili: x / Urobili: x   Blood: x / Protein: x / Nitrite: x   Leuk Esterase: x / RBC: x / WBC x   Sq Epi: x / Non Sq Epi: x / Bacteria: x        MEDICATIONS  (STANDING):  apixaban 5 milliGRAM(s) Oral every 12 hours  artificial  tears Solution 1 Drop(s) Both EYES every 12 hours  ascorbic acid 500 milliGRAM(s) Oral daily  atovaquone  Suspension 1500 milliGRAM(s) Oral daily  dextrose 5%. 1000 milliLiter(s) (50 mL/Hr) IV Continuous <Continuous>  dextrose 5%. 1000 milliLiter(s) (100 mL/Hr) IV Continuous <Continuous>  dextrose 50% Injectable 12.5 Gram(s) IV Push once  dextrose 50% Injectable 25 Gram(s) IV Push once  dextrose 50% Injectable 25 Gram(s) IV Push once  folic acid 1 milliGRAM(s) Oral daily  gabapentin 300 milliGRAM(s) Oral at bedtime  glucagon  Injectable 1 milliGRAM(s) IntraMuscular once  hydrocortisone hemorrhoidal Suppository 1 Suppository(s) Rectal two times a day  influenza   Vaccine 0.5 milliLiter(s) IntraMuscular once  lactobacillus acidophilus 1 Tablet(s) Oral two times a day with meals  melatonin 6 milliGRAM(s) Oral at bedtime  methylPREDNISolone 56 milliGRAM(s) Oral daily  methylPREDNISolone   Oral   metoprolol tartrate 25 milliGRAM(s) Oral two times a day  multivitamin 1 Tablet(s) Oral daily  nystatin Powder 1 Application(s) Topical two times a day  pantoprazole    Tablet 40 milliGRAM(s) Oral before breakfast  psyllium Powder 1 Packet(s) Oral daily  senna 2 Tablet(s) Oral at bedtime  sodium chloride 0.9% lock flush 5 milliLiter(s) IV Push two times a day  zinc oxide 40% Paste 1 Application(s) Topical three times a day    MEDICATIONS  (PRN):  albuterol/ipratropium for Nebulization 3 milliLiter(s) Nebulizer every 6 hours PRN Shortness of Breath and/or Wheezing  aluminum hydroxide/magnesium hydroxide/simethicone Suspension 30 milliLiter(s) Oral every 4 hours PRN Dyspepsia  dextrose Oral Gel 15 Gram(s) Oral once PRN Blood Glucose LESS THAN 70 milliGRAM(s)/deciliter  loperamide 2 milliGRAM(s) Oral three times a day PRN Diarrhea  polyethylene glycol 3350 17 Gram(s) Oral daily PRN Constipation  SIMETHICONE SOFTGELS 250 milliGRAM(s) 250 milliGRAM(s) Oral three times a day PRN for gas  sodium chloride 0.65% Nasal 1 Spray(s) Both Nostrils every 8 hours PRN Nasal Congestion

## 2023-10-30 NOTE — BH CONSULTATION LIAISON ASSESSMENT NOTE - NSBHCHARTREVIEWVS_PSY_A_CORE FT
Vital Signs Last 24 Hrs  T(C): 36.6 (29 Oct 2023 19:35), Max: 36.6 (29 Oct 2023 19:35)  T(F): 97.9 (29 Oct 2023 19:35), Max: 97.9 (29 Oct 2023 19:35)  HR: 76 (30 Oct 2023 06:35) (76 - 78)  BP: 124/74 (30 Oct 2023 06:35) (109/64 - 124/74)  BP(mean): --  RR: 16 (29 Oct 2023 19:35) (16 - 16)  SpO2: 98% (29 Oct 2023 19:35) (98% - 98%)    Parameters below as of 29 Oct 2023 19:35  Patient On (Oxygen Delivery Method): nasal cannula  O2 Flow (L/min): 1

## 2023-10-30 NOTE — BH CONSULTATION LIAISON ASSESSMENT NOTE - NSBHCONSULTFOLLOWAFTERCARE_PSY_A_CORE FT
As per treatment team would defer recommendations for now, too soon in treatment course for writer to comment.

## 2023-10-30 NOTE — BH CONSULTATION LIAISON ASSESSMENT NOTE - NSSUICPROTFACT_PSY_ALL_CORE
Identifies reasons for living/Supportive social network of family or friends/Cultural, spiritual and/or moral attitudes against suicide/Engaged in work or school/Ability to cope with stress

## 2023-10-30 NOTE — PROGRESS NOTE ADULT - NS ATTEND AMEND GEN_ALL_CORE FT
Seen and examined, note revised, findings as noted    Reports uneventful week end  Tolerating diet  Rt groin wound responding to wound care    Alert, fully oriented  Edema--legs and arms, significantly improved    Oxy treatment--making progress with attempt at tapering    Making gradual and minimal progress in therapy, mainly upper ext movement    Labs unremarkable    Continue therapy  Explore possibility of private pay for wk end therapy as requested by patient  Continue gradual oxy tapering during the day, aiming to keep oxy sats > 94%    Time based billing   Spent 63 mins discussed patient's care, explored treatment options, patient review and care co kelsie bowling

## 2023-10-30 NOTE — BH CONSULTATION LIAISON ASSESSMENT NOTE - NSICDXBHSECONDARYDX_PSY_ALL_CORE
Afib   I48.91  Tinnitus   H93.19  Lymphedema   I89.0  Lung interstitial disease   J84.9  Morbid obesity   E66.01

## 2023-10-30 NOTE — BH CONSULTATION LIAISON ASSESSMENT NOTE - SUMMARY
Pt is a 63 y/o female with a hx of interstitial lung disease, admitted for functional deficits, being evaluated by psychiatry. Pt presents with demoralization secondary to her current health status compared to her usual state of health.  Pt states that she is frustrated by her inability to care for herself and describes herself as a Type A person who looks after others, and isn't the type to be taken care of/ or dependent on others. Pt also expresses frustration and anxiety which she attributes to feeling behind in her rehabilitation  goals and performance, and believes that she is letting her partner and healthcare team down.   Psychiatry called to address these issues and consider a pharmacological approach, pt is also receiving services from neuropsychology.

## 2023-10-30 NOTE — BH CONSULTATION LIAISON ASSESSMENT NOTE - NSRISKVIOL_PSY_A_CORE
No past medical history on file.    
Reviewed and accepted note.  Alert and cooperative  -burning, -itching, -tearing, -flashes/floater, -headache, -diplopia      Consult from Dr. Guo  Fax notes to [phone]    12 weeks pregnant  Blurred vision at distance    No results found for: HGBA1C  Dilated fundus exam performed.   Diabetes without ocular complication.   Explained risk for glaucoma, cataract, retinopathy and benefits of diet, exercise and goal of A1C less than 7.0.    Annual observation advised.   Negative macular edema.     Increased myopia is the cause of blurred vision   May be physiologic or related to blood sugar    Large cup-to-disc   Glaucoma risk level   Explained anatomy of the eye and risk factors for glaucoma and asymptomatic but potentially vision threatening disease of the eye.     Monitor one year   Consider OCT; Field; and Pachymetry           
Low

## 2023-10-30 NOTE — BH CONSULTATION LIAISON ASSESSMENT NOTE - NSBHCHARTREVIEWLAB_PSY_A_CORE FT
10-30    138  |  103  |  18  ----------------------------<  101<H>  3.5   |  28  |  0.36<L>    Ca    9.2      30 Oct 2023 06:30    TPro  4.9<L>  /  Alb  2.2<L>  /  TBili  1.5<H>  /  DBili  x   /  AST  53<H>  /  ALT  80<H>  /  AlkPhos  276<H>  10-30    10-30    138  |  103  |  18  ----------------------------<  101<H>  3.5   |  28  |  0.36<L>    Ca    9.2      30 Oct 2023 06:30    TPro  4.9<L>  /  Alb  2.2<L>  /  TBili  1.5<H>  /  DBili  x   /  AST  53<H>  /  ALT  80<H>  /  AlkPhos  276<H>  10-30

## 2023-10-30 NOTE — BH CONSULTATION LIAISON ASSESSMENT NOTE - HPI (INCLUDE ILLNESS QUALITY, SEVERITY, DURATION, TIMING, CONTEXT, MODIFYING FACTORS, ASSOCIATED SIGNS AND SYMPTOMS)
HPI:  Zo Hager is a 62 year old female with PMH of pAFIB and sciatica; who presented to [facility] ED with SOB. She has been experiencing chronic SOB and non-productive cough for several months and was worked up in [place] where she had a CT scan which resulted negative.  She has been evaluated by Pulmonology and Rheumatology and was ruled out for lupus and immunological disorders.  She recently went to Urgent Care due to SOB with ambulating short distances (12-15 feet), and an XR was concerning for BL LL infiltrates. While at [facility],  CXR and CTA were significant for ground glass opacities, and interstitial lung disease, respectively. She was treated with empiric Vancomycin and Zosyn. She underwent a bronchoscopy with bronchoalveolar lavage and pulse steroids. She was given IV diuretics for increased pulmonary congestion, but her respiratory status continued to worsen.  On 9/13, she was transferred to [facility] for ECMO requirements. She was further treated with Plasmapheresis, Rituximab and high-dose steroids, with significant improvement.   Her overall hospital course was complicated by thrombocytopenia (s/p several platelet transfusions), leukocytosis (infectious workup entirely negative- s/p Vanco and Ceftriaxone), AFIB with RVR (resolved with Metoprolol), dysphagia (requiring NGT), transaminitis (secondary to acute illness and hypotension),  non-occlusive RIJ DVT (started on Lovenox). PM&R was consulted and deemed her an appropriate candidate for IRF. She was medically stabilized and cleared for discharge to [facility].  (05 Oct 2023 13:13)    Psychiatric C/L Note: Psychiatry called to see patient due to concerns about her reaction to and adjustment to current medical condition. Pt without use of her legs and limited use of her upper extremities. Pt had been expecting to recover from her interstitial lung disease, and then suddenly ended up on ECMO. Pt reports she did not sleep in ICU and was fearful to do so. She also had the sense of being weighed down by her blankets, and being unable to even scratch her face on her own. " I'm just a head in a bed." Pt reports feeling her rehab course was affected by a few factors ( e.g episodes of diarrhea, steroid dose being inadvertently off the MAR, wound care needed for skin breakdown). Pt now feels "behind", and is not sure what to expect in terms of level of recovery. Pt denied being depressed emotionally but her circumstances are depressing, has felt hopeless and helpless at times. Pt denied being suicidal, homicidal, no evidence of delirium, alejandro or psychosis. Denied issues with her memory or concentration, feels some pressure to maintain a brighter outlook than she feels at times, has increased appetite due to steroids. No panic symptoms with exception of feeling "in" her bed rather than "on  it", due to mattress not being firm. Pt amenable to use of Alprazolam for anxiety, she has taken Valium rarely in the past, but describes herself as someone who would not previously even take aspirin but pushes through all types of barriers, and has been particularly successful in business.

## 2023-10-30 NOTE — PROGRESS NOTE ADULT - ASSESSMENT
Assessment/Plan:  ALIZA FOLEY is a 62 year old female with PMH of pAFIB and sciatica; who presented to [facility] ED with SOB, and was found to have groundglass opacities and interstitial lung disease. She was treated with empiric Vancomycin and Zosyn. She underwent a bronchoscopy with bronchoalveolar lavage and pulse steroids. She was given IV diuretics for increased pulmonary congestion, but her respiratory status continued to worsen.  On 9/13, she was transferred to [facility] for ECMO requirements. She was further treated with Plasmapheresis, Rituximab and high-dose steroids, with significant improvement. Her overall hospital course was complicated by thrombocytopenia (s/p several platelet transfusions), leukocytosis (infectious workup entirely negative- s/p Vanco and Ceftriaxone), AFIB with RVR (resolved with Metoprolol), dysphagia (requiring NGT), transaminitis (secondary to acute illness and hypotension),  non-occlusive RIJ DVT (started on Lovenox). Patient now admitted for a multidisciplinary rehab program. 10-05-23 @ 13:18    * Wound care following  * Pulmonary team following - continue steroid taper- Plan to wean off 02 as tolerated   * DC planning as already discussed, staring with appeal for extension of acute rehab stay duration (SW sending notes to insurance) and other options depending on rhe response      #Interstitial Lung Disease and Mixed connective tissue disease  - Gait Instability, ADL impairments and Functional impairments: start Comprehensive Rehab Program of PT/OT/SLP - 3 hours a day, 5 days a week  - P&O as needed   - Non-specific Interstitial Lung Disease  - Requiring ECMO   - Improvement s/p Plasmapheresis, Rituximab and high- dose steroids   - Albuterol/Ipratropium Nebulizer every 6 hours  - Mepron 1500mg daily  - PO Medrol ( due to liver dysfunction): Plan will be to taper by ~20% every 2 weeks  Medrol   64mg x 2 weeks   56mg x 2 weeks  44mg x 2 weeks   36mg x 2 weeks - Follow up Outpatient   -- Repeat CT Chest in 6 weeks   --Pulmonary team--f/u with patient and clarify when she can get immunizations    -Perform BL elbow flexion & elbow extension with antigravity in therapy  - Noted to have missed 5 days between steroid taper dosing- resumed at 56mg on 10/26- pulm recommends to continue as originally instructed   - Plan to wean 02 as tolerated     10/24--SW will apply to insurance for extension in few days and proceed with other options depending on response  F/U to earlier discussion on 1/018    #pAFIB/Rt Ij thrombus  - Metoprolol 25mg BID and lovenox  --- Eliquis 5mg BID - tolerating well     # Chronic Tinnitus   - ENT review and recs appreciate, Patient already made ENT appointment for f/u    # Lymphedema both legs -chronic and Rt IJ thrombus  - ACE Wrap BL LEs  - BL LE doppler negative for DVT  - BL UE doppler with chronic thrombotic changes in RIJ       #Transaminitis --LFT Down trending   - Secondary to acute illness and hypotension at [facility]  - Outpatient follow up     #Neuropathy  --continue gabapentin 10/16, dose increased 10/23 to 200mg qhs  --Work on motor strengthening, priority for UE as preferred by patient     #Sleep  - Melatonin 6mg at HS    #Skin  - Skin: Left lower abdomen ruptured blister 3.0 x 1.0, stage 2 pressure injury to right buttock 4 x1 and left buttock 3x1, stage 2 pressure injury ti right inner thigh 0.2 x 0.2, right groin wound 10.5 x 2.0, Left groin wound 5 x 5,  right neck scab wound  -- MAD to skin folds- Nystatin to submammary and abdominal pannus BID  -- MAD to L groin- cleanse with NS, Triad cream daily  -- Mad to R groin- Nystatin powder daily   -- R groin wound-- aquacel packing, Triad to periwound, Allevyn foam- daily and PRN   - Pressure injury/Skin: OOB to Chair, PT/OT   - Offload pressure, low air loss support surfaces, T&P every 2 hours   --Wound care consult-10/9 -Multiple wounds--perineal and buttock/sacral, recs appreciated   --Suggest Continue treatments as below:   Twice daily & PRN Normal Saline Cleanse of abdominal pannus & breast folds, perineal, Left & Right inner buttock erosions.  Pat thoroughly dry.   Apply Desitin generously twice daily (& PRN) to perineal, buttocks, and left groin erosions, leave open to air.    Apply Nystatin powder to abdominal pannus and breast folds.  Suggest use of Interdry cloths in folds to 'wick' moisture.  Suggest daily Normal Saline Cleanse of right groin surgical wound, pat dry.  Apply Cavillon film barrier to intact periwound skin.  Place Aquacell strip over open area, cover with foam dressing.  - Wound care following       #Pain Mgmt   - Tylenol PRN   - Gabapentin 300mg at HS     #GI/Bowel Mgmt   - Pantoprazole  - Senna  --on hold due to recent Loose BM  - PRN: Maalox   - Stool count  -  Lactobacillus BID   -- AB XR mod stool burden on transverse colon 10/23--still has mod stool burden, but moving bowel appropriately  - S/p enema and lactulose    # Alternating bowel function --commenced probiotic, latobacilus   * Leucocytosis probably steroid related and partly due to her Mixed Connective Tissue Disease, will continue monitoring     #/Bladder Mgmt --voiding     #FEN   #Dysphagia  - Diet - Minced & Moist  [CC]    - Dysphaiga- SLP evaluation     #Health maintenance  - Folic Acid   - MVI  - Ocean nasal spray    # Difficulty with access to peripheral veins-- Blood draws from Left arm Medline     #Precautions / PROPHYLAXIS:   - Falls  - ortho: Weight bearing status: WBAT   - Lungs: Aspiration, Incentive Spirometer   - DVT PPX: Eliquis 5 mg BID   ---------------------------  *l abs 10/26--stable anemia, normal renal function and down trending LFT      Liaison with family  Patient's partner present during review, details of review d/w her, detailed explanation of therapy protocol explained and she was happy with same  10/9--Partner present during review and discussed details treatment plan with her    Liaison with providers  Consult recs form multiple specialities being implemented  Surgical consult and recs 10/9--agreed with plan for AC for Rt IJ chronic thrombus    10/10 --No response to multiple calls to patient'srivate cardiologist Dr Otto Stratton  ph     ---------------------------  IDT conference on 10/24  Social Work: Await insurance extension/self pay.   SLP: --  OT: Total A for ADLs/transfers.   PT: Damaris for transfers. Amb 30ft with LiteGait.   RT: Continues to decline.  DME: TBD   Barriers: R groin wound care.  Functional level on DC: Max A for self care.   TDD: 11/1 to EMIGDIO.  ---------------------------  OUTPATIENT/FOLLOW UP:    Trae Whitney  Pulmonary Disease  [address], [address] [place] [address]  Phone: [phone]  Fax: [phone]  Follow Up Time: 2 weeks    Ryanne Allen  Rheumatology  [address] [address]  Phone: [phone]  Fax: [phone]  Follow Up Time: 1 week    Kyle Pierce [place]  Internal Medicine  [address] [place] [address], [address]  [place] [address]  Phone: [phone]  Fax: [phone]  Follow Up Time: 2 weeks    Addy Powers  Pulmonary Disease  410 [facility], [address] [id number]  Phone: [phone]  Fax: [phone]  Follow Up Time:     Kwame Hawley [facility]  Critical Care Medicine  410 [facility], [address]  [place] [address]  Phone: [phone]  Fax: [phone]  Follow Up Time:     Neena Borrego  Rheumatology  [address] [facility], [address]  [place] [address]  Phone: [phone]  Fax: [phone]  Follow Up Time:    [facility]  [facility] [address]  Scheduled Appointment: 09/13/2023  2:40 PM  ---------------------------   Assessment/Plan:  ALIZA FOLEY is a 62 year old female with PMH of pAFIB and sciatica; who presented to [facility] ED with SOB, and was found to have groundglass opacities and interstitial lung disease. She was treated with empiric Vancomycin and Zosyn. She underwent a bronchoscopy with bronchoalveolar lavage and pulse steroids. She was given IV diuretics for increased pulmonary congestion, but her respiratory status continued to worsen.  On 9/13, she was transferred to [facility] for ECMO requirements. She was further treated with Plasmapheresis, Rituximab and high-dose steroids, with significant improvement. Her overall hospital course was complicated by thrombocytopenia (s/p several platelet transfusions), leukocytosis (infectious workup entirely negative- s/p Vanco and Ceftriaxone), AFIB with RVR (resolved with Metoprolol), dysphagia (requiring NGT), transaminitis (secondary to acute illness and hypotension),  non-occlusive RIJ DVT (started on Lovenox). Patient now admitted for a multidisciplinary rehab program. 10-05-23 @ 13:18    * Wound care following  * Pulmonary team following - continue steroid taper- Plan to wean off 02 as tolerated   * Medication times adjusted per patient preference   * DC planning as already discussed, staring with appeal for extension of acute rehab stay duration (SW sending notes to insurance) and other options depending on rhe response      #Interstitial Lung Disease and Mixed connective tissue disease  - Gait Instability, ADL impairments and Functional impairments: start Comprehensive Rehab Program of PT/OT/SLP - 3 hours a day, 5 days a week  - P&O as needed   - Non-specific Interstitial Lung Disease  - Requiring ECMO   - Improvement s/p Plasmapheresis, Rituximab and high- dose steroids   - Albuterol/Ipratropium Nebulizer every 6 hours  - Mepron 1500mg daily  - PO Medrol ( due to liver dysfunction): Plan will be to taper by ~20% every 2 weeks  Medrol   64mg x 2 weeks   56mg x 2 weeks  44mg x 2 weeks   36mg x 2 weeks - Follow up Outpatient   -- Repeat CT Chest in 6 weeks   --Pulmonary team--f/u with patient and clarify when she can get immunizations    -Perform BL elbow flexion & elbow extension with antigravity in therapy  - Noted to have missed 5 days between steroid taper dosing- resumed at 56mg on 10/26- pulm recommends to continue as originally instructed   - Plan to wean 02 as tolerated     10/24--SW will apply to insurance for extension in few days and proceed with other options depending on response  F/U to earlier discussion on 1/018    #pAFIB/Rt Ij thrombus  - Metoprolol 25mg BID and lovenox  --- Eliquis 5mg BID - tolerating well     # Chronic Tinnitus   - ENT review and recs appreciate, Patient already made ENT appointment for f/u    # Lymphedema both legs -chronic and Rt IJ thrombus  - ACE Wrap BL LEs  - BL LE doppler negative for DVT  - BL UE doppler with chronic thrombotic changes in RIJ       #Transaminitis --LFT Down trending   - Secondary to acute illness and hypotension at [facility]  - Outpatient follow up     #Neuropathy  --continue gabapentin 10/16, dose increased 10/23 to 200mg qhs  --Work on motor strengthening, priority for UE as preferred by patient     #Sleep  - Melatonin 6mg at HS    #Skin  - Skin: Left lower abdomen ruptured blister 3.0 x 1.0, stage 2 pressure injury to right buttock 4 x1 and left buttock 3x1, stage 2 pressure injury ti right inner thigh 0.2 x 0.2, right groin wound 10.5 x 2.0, Left groin wound 5 x 5,  right neck scab wound  -- MAD to skin folds- Nystatin to submammary and abdominal pannus BID  -- MAD to L groin- cleanse with NS, Triad cream daily  -- Mad to R groin- Nystatin powder daily   -- R groin wound-- aquacel packing, Triad to periwound, Allevyn foam- daily and PRN   - Pressure injury/Skin: OOB to Chair, PT/OT   - Offload pressure, low air loss support surfaces, T&P every 2 hours   --Wound care consult-10/9 -Multiple wounds--perineal and buttock/sacral, recs appreciated   --Suggest Continue treatments as below:   Twice daily & PRN Normal Saline Cleanse of abdominal pannus & breast folds, perineal, Left & Right inner buttock erosions.  Pat thoroughly dry.   Apply Desitin generously twice daily (& PRN) to perineal, buttocks, and left groin erosions, leave open to air.    Apply Nystatin powder to abdominal pannus and breast folds.  Suggest use of Interdry cloths in folds to 'wick' moisture.  Suggest daily Normal Saline Cleanse of right groin surgical wound, pat dry.  Apply Cavillon film barrier to intact periwound skin.  Place Aquacell strip over open area, cover with foam dressing.  - Wound care following       #Pain Mgmt   - Tylenol PRN   - Gabapentin 300mg at HS     #GI/Bowel Mgmt   - Pantoprazole  - Senna  --on hold due to recent Loose BM  - PRN: Maalox   - Stool count  -  Lactobacillus BID   -- AB XR mod stool burden on transverse colon 10/23--still has mod stool burden, but moving bowel appropriately  - S/p enema and lactulose    # Alternating bowel function --commenced probiotic, latobacilus   * Leucocytosis probably steroid related and partly due to her Mixed Connective Tissue Disease, will continue monitoring     #/Bladder Mgmt --voiding     #FEN   #Dysphagia  - Diet - Minced & Moist  [CC]    - Dysphaiga- SLP evaluation     #Health maintenance  - Folic Acid   - MVI  - Ocean nasal spray    # Difficulty with access to peripheral veins-- Blood draws from Left arm Medline     #Precautions / PROPHYLAXIS:   - Falls  - ortho: Weight bearing status: WBAT   - Lungs: Aspiration, Incentive Spirometer   - DVT PPX: Eliquis 5 mg BID   ---------------------------  *l abs 10/26--stable anemia, normal renal function and down trending LFT      Liaison with family  Patient's partner present during review, details of review d/w her, detailed explanation of therapy protocol explained and she was happy with same  10/9--Partner present during review and discussed details treatment plan with her    Liaison with providers  Consult recs form multiple specialities being implemented  Surgical consult and recs 10/9--agreed with plan for AC for Rt IJ chronic thrombus    10/10 --No response to multiple calls to patient'srivate cardiologist Dr Otto Stratton  ph     ---------------------------  IDT conference on 10/24  Social Work: Await insurance extension/self pay.   SLP: --  OT: Total A for ADLs/transfers.   PT: Damaris for transfers. Amb 30ft with LiteGait.   RT: Continues to decline.  DME: TBD   Barriers: R groin wound care.  Functional level on DC: Max A for self care.   TDD: 11/1 to EMIGDIO.  ---------------------------  OUTPATIENT/FOLLOW UP:    Trae Whitney  Pulmonary Disease  [address], [address] [place] [address]  Phone: [phone]  Fax: [phone]  Follow Up Time: 2 weeks    Ryanne Allen  Rheumatology  [address] [address]  Phone: [phone]  Fax: [phone]  Follow Up Time: 1 week    Kyle Pierce [place]  Internal Medicine  [address] [place] [address], [address]  [place] [address]  Phone: [phone]  Fax: [phone]  Follow Up Time: 2 weeks    Addy Powers  Pulmonary Disease  410 [facility], [address] [id number]  Phone: [phone]  Fax: [phone]  Follow Up Time:     Kwame Hawley  Critical Care Medicine  410 [facility], [address]  [place] [address]  Phone: [phone]  Fax: [phone]  Follow Up Time:     Neena Borrego  Rheumatology  [address] [facility], [address]  [place] [address]  Phone: [phone]  Fax: [phone]  Follow Up Time:    [facility]  [facility] [address]  Scheduled Appointment: 09/13/2023  2:40 PM  ---------------------------   Assessment/Plan:  ALIZA FOLEY is a 62 year old female with PMH of pAFIB and sciatica; who presented to [facility] ED with SOB, and was found to have groundglass opacities and interstitial lung disease. She was treated with empiric Vancomycin and Zosyn. She underwent a bronchoscopy with bronchoalveolar lavage and pulse steroids. She was given IV diuretics for increased pulmonary congestion, but her respiratory status continued to worsen.  On 9/13, she was transferred to [facility] for ECMO requirements. She was further treated with Plasmapheresis, Rituximab and high-dose steroids, with significant improvement. Her overall hospital course was complicated by thrombocytopenia (s/p several platelet transfusions), leukocytosis (infectious workup entirely negative- s/p Vanco and Ceftriaxone), AFIB with RVR (resolved with Metoprolol), dysphagia (requiring NGT), transaminitis (secondary to acute illness and hypotension),  non-occlusive RIJ DVT (started on Lovenox). Patient now admitted for a multidisciplinary rehab program. 10-05-23 @ 13:18    * Wound care following  * Pulmonary team following - continue steroid taper- Plan to wean off 02 as tolerated   * Medication times adjusted per patient preference   * DC planning as already discussed, staring with appeal for extension of acute rehab stay duration (SW sending notes to insurance) and other options depending on rhe response  * labs unremarkable  * Explore possibility of private pay for extra therapy during week ends as requested by patient      #Interstitial Lung Disease and Mixed connective tissue disease  - Gait Instability, ADL impairments and Functional impairments: start Comprehensive Rehab Program of PT/OT/SLP - 3 hours a day, 5 days a week  - P&O as needed   - Non-specific Interstitial Lung Disease  - Requiring ECMO   - Improvement s/p Plasmapheresis, Rituximab and high- dose steroids   - Albuterol/Ipratropium Nebulizer every 6 hours  - Mepron 1500mg daily  - PO Medrol ( due to liver dysfunction): Plan will be to taper by ~20% every 2 weeks  Medrol   64mg x 2 weeks   56mg x 2 weeks  44mg x 2 weeks   36mg x 2 weeks - Follow up Outpatient   -- Repeat CT Chest in 6 weeks   --Pulmonary team--f/u with patient and clarify when she can get immunizations    -Perform BL elbow flexion & elbow extension with antigravity in therapy  - Noted to have missed 5 days between steroid taper dosing- resumed at 56mg on 10/26- pulm recommends to continue as originally instructed   - Plan to wean 02 as tolerated     10/24--SW will apply to insurance for extension in few days and proceed with other options depending on response  F/U to earlier discussion on 1/018    #pAFIB/Rt Ij thrombus  - Metoprolol 25mg BID and lovenox  --- Eliquis 5mg BID - tolerating well     # Chronic Tinnitus   - ENT review and recs appreciate, Patient already made ENT appointment for f/u    # Lymphedema both legs -chronic and Rt IJ thrombus  - ACE Wrap BL LEs  - BL LE doppler negative for DVT  - BL UE doppler with chronic thrombotic changes in RIJ       #Transaminitis --LFT Down trending   - Secondary to acute illness and hypotension at [facility]  - Outpatient follow up     #Neuropathy  --continue gabapentin 10/16, dose increased 10/23 to 200mg qhs  --Work on motor strengthening, priority for UE as preferred by patient     #Sleep  - Melatonin 6mg at HS    #Skin  - Skin: Left lower abdomen ruptured blister 3.0 x 1.0, stage 2 pressure injury to right buttock 4 x1 and left buttock 3x1, stage 2 pressure injury ti right inner thigh 0.2 x 0.2, right groin wound 10.5 x 2.0, Left groin wound 5 x 5,  right neck scab wound  -- MAD to skin folds- Nystatin to submammary and abdominal pannus BID  -- MAD to L groin- cleanse with NS, Triad cream daily  -- Mad to R groin- Nystatin powder daily   -- R groin wound-- aquacel packing, Triad to periwound, Allevyn foam- daily and PRN   - Pressure injury/Skin: OOB to Chair, PT/OT   - Offload pressure, low air loss support surfaces, T&P every 2 hours   --Wound care consult-10/9 -Multiple wounds--perineal and buttock/sacral, recs appreciated   --Suggest Continue treatments as below:   Twice daily & PRN Normal Saline Cleanse of abdominal pannus & breast folds, perineal, Left & Right inner buttock erosions.  Pat thoroughly dry.   Apply Desitin generously twice daily (& PRN) to perineal, buttocks, and left groin erosions, leave open to air.    Apply Nystatin powder to abdominal pannus and breast folds.  Suggest use of Interdry cloths in folds to 'wick' moisture.  Suggest daily Normal Saline Cleanse of right groin surgical wound, pat dry.  Apply Cavillon film barrier to intact periwound skin.  Place Aquacell strip over open area, cover with foam dressing.  - Wound care following       #Pain Mgmt   - Tylenol PRN   - Gabapentin 300mg at HS     #GI/Bowel Mgmt   - Pantoprazole  - Senna  --on hold due to recent Loose BM  - PRN: Maalox   - Stool count  -  Lactobacillus BID   -- AB XR mod stool burden on transverse colon 10/23--still has mod stool burden, but moving bowel appropriately  - S/p enema and lactulose    # Alternating bowel function --commenced probiotic, latobacilus   * Leucocytosis probably steroid related and partly due to her Mixed Connective Tissue Disease, will continue monitoring     #/Bladder Mgmt --voiding     #FEN   #Dysphagia  - Diet - Minced & Moist  [CC]    - Dysphaiga- SLP evaluation     #Health maintenance  - Folic Acid   - MVI  - Ocean nasal spray    # Difficulty with access to peripheral veins-- Blood draws from Left arm Medline     #Precautions / PROPHYLAXIS:   - Falls  - ortho: Weight bearing status: WBAT   - Lungs: Aspiration, Incentive Spirometer   - DVT PPX: Eliquis 5 mg BID   ---------------------------  *l abs 10/230--stable anemia, normal renal function and down trending LFT      Liaison with family  Patient's partner present during review, details of review d/w her, detailed explanation of therapy protocol explained and she was happy with same  10/9--Partner present during review and discussed details treatment plan with her    Liaison with providers  Consult recs form multiple specialities being implemented  Surgical consult and recs 10/9--agreed with plan for AC for Rt IJ chronic thrombus    10/10 --No response to multiple calls to patient'[facility] cardiologist Dr Otto Stratton  ph     ---------------------------  IDT conference on 10/24  Social Work: Await insurance extension/self pay.   SLP: --  OT: Total A for ADLs/transfers.   PT: Damaris for transfers. Amb 30ft with LiteGait.   RT: Continues to decline.  DME: TBD   Barriers: R groin wound care.  Functional level on DC: Max A for self care.   TDD: 11/1 to EMIGDIO.  ---------------------------  OUTPATIENT/FOLLOW UP:    Trae Whitney  Pulmonary Disease  [address], [address] [place] [address]  Phone: [phone]  Fax: [phone]  Follow Up Time: 2 weeks    Ryanne Allen  Rheumatology  [address] [address]  Phone: [phone]  Fax: [phone]  Follow Up Time: 1 week    Kyle Pierce  Internal Medicine  [address] [place] [address], [address]  [place] [address]  Phone: [phone]  Fax: [phone]  Follow Up Time: 2 weeks    Addy Powers  Pulmonary Disease  410 [facility], [address] [id number]  Phone: [phone]  Fax: [phone]  Follow Up Time:     Kwame Hawley  Critical Care Medicine  410 [facility], [address] [address]  Phone: [phone]  Fax: [phone]  Follow Up Time:     Neena Borrego  Rheumatology  [address] [facility], Floor [address]  [place] [address]  Phone: [phone]  Fax: [phone]  Follow Up Time:    [facility]  [facility] [address]  Scheduled Appointment: 09/13/2023  2:40 PM  ---------------------------

## 2023-10-30 NOTE — BH CONSULTATION LIAISON ASSESSMENT NOTE - NSBHCONSULTRECOMMENDOTHER_PSY_A_CORE FT
1) Would give patient [place] goals and also feedback on what she can expect from the recovery process, guessing only increases anxiety.   ( e.g noting that she has been able to increase reps in PT , or that she has been able to gain more truncal coordination).   2) Add Alprazolam for anxiety , can use for sleep.   3) Defer use of antidepressant at present ( not part of pt psychological make up to want any medication that she would be dependent on).   4) Allow choice and control wherever possible ( already done with limiting intrusions to help with sleep).  5) Should pt want to stay longer, would approach her regarding a self pay option rather than having insurance dictate length of stay.   Pt feels pressured to perform and feels "behind", which is adding more stress and possibly inhibiting performance.   6) If she has a chief complaint- Believe her.

## 2023-10-30 NOTE — BH CONSULTATION LIAISON ASSESSMENT NOTE - OTHER
Pt found it difficult to relinquish control, feels embarrassed about being dependent on others for her ADL's.  Pt is frustration about her current health conditions and is anxious about her future.

## 2023-10-30 NOTE — CHART NOTE - NSCHARTNOTEFT_GEN_A_CORE
Nutrition Follow Up Note  Source: Medical Record [X] Patient [ ] Family [X] pt's partner provided info         Diet: Consistent carbohydrate/no snacks  Pt tolerating diet with good PO intake, eating >75% of meals per nursing flow sheets  Pt's partner reports pt w/ good PO intake, requests trial of chocolate Glucerna (provides 220 kcal, 10 g protein/serving). No digestive issues reported. Darinel supplement d/c, pt was not drinking it.    Enteral/Parenteral Nutrition: N/A    Current Weight: 256.6 lbs (10-5)    Pertinent Medications: MEDICATIONS  (STANDING):  apixaban 5 milliGRAM(s) Oral every 12 hours  artificial  tears Solution 1 Drop(s) Both EYES every 12 hours  ascorbic acid 500 milliGRAM(s) Oral daily  atovaquone  Suspension 1500 milliGRAM(s) Oral daily  dextrose 5%. 1000 milliLiter(s) (100 mL/Hr) IV Continuous <Continuous>  dextrose 5%. 1000 milliLiter(s) (50 mL/Hr) IV Continuous <Continuous>  dextrose 50% Injectable 12.5 Gram(s) IV Push once  dextrose 50% Injectable 25 Gram(s) IV Push once  dextrose 50% Injectable 25 Gram(s) IV Push once  folic acid 1 milliGRAM(s) Oral daily  gabapentin 300 milliGRAM(s) Oral at bedtime  glucagon  Injectable 1 milliGRAM(s) IntraMuscular once  hydrocortisone hemorrhoidal Suppository 1 Suppository(s) Rectal two times a day  influenza   Vaccine 0.5 milliLiter(s) IntraMuscular once  lactobacillus acidophilus 1 Tablet(s) Oral two times a day with meals  melatonin 6 milliGRAM(s) Oral at bedtime  methylPREDNISolone   Oral   methylPREDNISolone 56 milliGRAM(s) Oral daily  metoprolol tartrate 25 milliGRAM(s) Oral two times a day  multivitamin 1 Tablet(s) Oral daily  nystatin Powder 1 Application(s) Topical two times a day  pantoprazole    Tablet 40 milliGRAM(s) Oral before breakfast  psyllium Powder 1 Packet(s) Oral daily  senna 2 Tablet(s) Oral at bedtime  sodium chloride 0.9% lock flush 5 milliLiter(s) IV Push two times a day  zinc oxide 40% Paste 1 Application(s) Topical three times a day    MEDICATIONS  (PRN):  albuterol/ipratropium for Nebulization 3 milliLiter(s) Nebulizer every 6 hours PRN Shortness of Breath and/or Wheezing  aluminum hydroxide/magnesium hydroxide/simethicone Suspension 30 milliLiter(s) Oral every 4 hours PRN Dyspepsia  dextrose Oral Gel 15 Gram(s) Oral once PRN Blood Glucose LESS THAN 70 milliGRAM(s)/deciliter  loperamide 2 milliGRAM(s) Oral three times a day PRN Diarrhea  polyethylene glycol 3350 17 Gram(s) Oral daily PRN Constipation  SIMETHICONE SOFTGELS 250 milliGRAM(s) 250 milliGRAM(s) Oral three times a day PRN for gas  sodium chloride 0.65% Nasal 1 Spray(s) Both Nostrils every 8 hours PRN Nasal Congestion      Pertinent Labs:  10-30 Na138 mmol/L Glu 101 mg/dL<H> K+ 3.5 mmol/L Cr  0.36 mg/dL<L> BUN 18 mg/dL 10-30 Alb 2.2 g/dL<L>        Skin: R inner thigh stage II, R inner buttock stage II, L inner buttock stage II per nursing flow sheets    Edema: 2+ edema bilateral legs per nursing flow sheets    Last BM: on 10-28 per nursing flow sheets    Estimated Needs:   [X] No Change since Previous Assessment  [ ] Recalculated:     Previous Nutrition Diagnosis:   1. Severe malnutrition  2. Increased nutrient needs      Nutrition Diagnosis is [X] Ongoing - on MVI, vitamin C, Glucerna 1x/day added        New Nutrition Diagnosis: [X] Not Applicable  [ ] Inadequate Protein Energy Intake   [ ] Inadequate Oral Intake   [ ] Excessive Energy Intake   [ ] Increased Nutrient Needs   [ ] Obesity   [ ] Altered GI Function   [ ] Unintended Weight Loss   [ ] Food & Nutrition Related Knowledge Deficit  [ ] Limited Adherence to nutrition related recommendations   [ ] Malnutrition      Interventions:   1. Recommend continuing with current plan of care  2. encourage PO intake  3. Obtain and honor food preferences as able    Monitoring & Evaluation:   [X] Weights   [X] PO Intake   [X] Follow Up (Per Protocol)  [X] Tolerance to Diet Prescription   [X] Other: Labs     RD Remains Available.  Desire Livingston RD

## 2023-10-30 NOTE — BH CONSULTATION LIAISON ASSESSMENT NOTE - CURRENT MEDICATION
MEDICATIONS  (STANDING):  apixaban 5 milliGRAM(s) Oral every 12 hours  artificial  tears Solution 1 Drop(s) Both EYES every 12 hours  ascorbic acid 500 milliGRAM(s) Oral daily  atovaquone  Suspension 1500 milliGRAM(s) Oral daily  dextrose 5%. 1000 milliLiter(s) (100 mL/Hr) IV Continuous <Continuous>  dextrose 5%. 1000 milliLiter(s) (50 mL/Hr) IV Continuous <Continuous>  dextrose 50% Injectable 12.5 Gram(s) IV Push once  dextrose 50% Injectable 25 Gram(s) IV Push once  dextrose 50% Injectable 25 Gram(s) IV Push once  folic acid 1 milliGRAM(s) Oral daily  gabapentin 300 milliGRAM(s) Oral at bedtime  glucagon  Injectable 1 milliGRAM(s) IntraMuscular once  hydrocortisone hemorrhoidal Suppository 1 Suppository(s) Rectal two times a day  influenza   Vaccine 0.5 milliLiter(s) IntraMuscular once  lactobacillus acidophilus 1 Tablet(s) Oral two times a day with meals  melatonin 6 milliGRAM(s) Oral at bedtime  methylPREDNISolone 56 milliGRAM(s) Oral daily  methylPREDNISolone   Oral   metoprolol tartrate 25 milliGRAM(s) Oral two times a day  multivitamin 1 Tablet(s) Oral daily  nystatin Powder 1 Application(s) Topical two times a day  pantoprazole    Tablet 40 milliGRAM(s) Oral before breakfast  psyllium Powder 1 Packet(s) Oral daily  senna 2 Tablet(s) Oral at bedtime  sodium chloride 0.9% lock flush 5 milliLiter(s) IV Push two times a day  zinc oxide 40% Paste 1 Application(s) Topical three times a day    MEDICATIONS  (PRN):  albuterol/ipratropium for Nebulization 3 milliLiter(s) Nebulizer every 6 hours PRN Shortness of Breath and/or Wheezing  aluminum hydroxide/magnesium hydroxide/simethicone Suspension 30 milliLiter(s) Oral every 4 hours PRN Dyspepsia  dextrose Oral Gel 15 Gram(s) Oral once PRN Blood Glucose LESS THAN 70 milliGRAM(s)/deciliter  loperamide 2 milliGRAM(s) Oral three times a day PRN Diarrhea  polyethylene glycol 3350 17 Gram(s) Oral daily PRN Constipation  SIMETHICONE SOFTGELS 250 milliGRAM(s) 250 milliGRAM(s) Oral three times a day PRN for gas  sodium chloride 0.65% Nasal 1 Spray(s) Both Nostrils every 8 hours PRN Nasal Congestion

## 2023-10-30 NOTE — BH CONSULTATION LIAISON ASSESSMENT NOTE - DIFFERENTIAL
Adjustment disorder with anxiety and depression.   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   MDD single episode.

## 2023-10-31 ENCOUNTER — APPOINTMENT (OUTPATIENT)
Dept: RHEUMATOLOGY | Facility: CLINIC | Age: 63
End: 2023-10-31

## 2023-10-31 PROCEDURE — 90832 PSYTX W PT 30 MINUTES: CPT

## 2023-10-31 PROCEDURE — 99232 SBSQ HOSP IP/OBS MODERATE 35: CPT

## 2023-10-31 RX ORDER — SOD,AMMONIUM,POTASSIUM LACTATE
1 CREAM (GRAM) TOPICAL EVERY 12 HOURS
Refills: 0 | Status: DISCONTINUED | OUTPATIENT
Start: 2023-10-31 | End: 2023-11-21

## 2023-10-31 RX ORDER — GABAPENTIN 400 MG/1
300 CAPSULE ORAL
Refills: 0 | Status: DISCONTINUED | OUTPATIENT
Start: 2023-10-31 | End: 2023-11-10

## 2023-10-31 RX ADMIN — ZINC OXIDE 1 APPLICATION(S): 200 OINTMENT TOPICAL at 13:10

## 2023-10-31 RX ADMIN — SODIUM CHLORIDE 5 MILLILITER(S): 9 INJECTION INTRAMUSCULAR; INTRAVENOUS; SUBCUTANEOUS at 09:04

## 2023-10-31 RX ADMIN — Medication 1 TABLET(S): at 08:52

## 2023-10-31 RX ADMIN — Medication 56 MILLIGRAM(S): at 08:52

## 2023-10-31 RX ADMIN — Medication 1 DROP(S): at 09:05

## 2023-10-31 RX ADMIN — ZINC OXIDE 1 APPLICATION(S): 200 OINTMENT TOPICAL at 09:05

## 2023-10-31 RX ADMIN — Medication 1 TABLET(S): at 12:20

## 2023-10-31 RX ADMIN — Medication 500 MILLIGRAM(S): at 12:20

## 2023-10-31 RX ADMIN — ATOVAQUONE 1500 MILLIGRAM(S): 750 SUSPENSION ORAL at 12:20

## 2023-10-31 RX ADMIN — Medication 1 MILLIGRAM(S): at 12:20

## 2023-10-31 RX ADMIN — Medication 25 MILLIGRAM(S): at 21:27

## 2023-10-31 RX ADMIN — SODIUM CHLORIDE 5 MILLILITER(S): 9 INJECTION INTRAMUSCULAR; INTRAVENOUS; SUBCUTANEOUS at 16:52

## 2023-10-31 RX ADMIN — Medication 25 MILLIGRAM(S): at 08:52

## 2023-10-31 RX ADMIN — NYSTATIN CREAM 1 APPLICATION(S): 100000 CREAM TOPICAL at 16:52

## 2023-10-31 RX ADMIN — ZINC OXIDE 1 APPLICATION(S): 200 OINTMENT TOPICAL at 21:28

## 2023-10-31 RX ADMIN — GABAPENTIN 300 MILLIGRAM(S): 400 CAPSULE ORAL at 17:50

## 2023-10-31 RX ADMIN — Medication 6 MILLIGRAM(S): at 21:16

## 2023-10-31 RX ADMIN — Medication 1 APPLICATION(S): at 21:16

## 2023-10-31 RX ADMIN — PANTOPRAZOLE SODIUM 40 MILLIGRAM(S): 20 TABLET, DELAYED RELEASE ORAL at 08:53

## 2023-10-31 RX ADMIN — NYSTATIN CREAM 1 APPLICATION(S): 100000 CREAM TOPICAL at 09:04

## 2023-10-31 RX ADMIN — APIXABAN 5 MILLIGRAM(S): 2.5 TABLET, FILM COATED ORAL at 08:53

## 2023-10-31 RX ADMIN — Medication 1 DROP(S): at 21:18

## 2023-10-31 RX ADMIN — APIXABAN 5 MILLIGRAM(S): 2.5 TABLET, FILM COATED ORAL at 21:17

## 2023-10-31 RX ADMIN — Medication 1 TABLET(S): at 17:50

## 2023-10-31 NOTE — PROGRESS NOTE ADULT - SUBJECTIVE AND OBJECTIVE BOX
Patient seen at bedside for 20-minute, supportive psychotherapy session. Personal care attendant is present, per patient preference.     Patient indicates continued progress with use of arm/hand functioning. She is hopeful that over time, her ability to stand and walk may improve. She expresses a desire to remain in acute rehabilitation for as long as feasible, as she believes it is the best opportunity available to make progress.

## 2023-10-31 NOTE — PROGRESS NOTE ADULT - SUBJECTIVE AND OBJECTIVE BOX
Follow-up Pulmonary Progress Note  Chief Complaint : Interstitial lung disease    doing well  now on room air  doing well with therapy   on room air with therapy as well  denies cp, sob, cough, secretions.       Allergies :No Known Allergies      PAST MEDICAL & SURGICAL HISTORY:  Afib    Sciatica    Interstitial lung disease    Lymphedema        Medications:  MEDICATIONS  (STANDING):  ammonium lactate 12% Lotion 1 Application(s) Topical every 12 hours  apixaban 5 milliGRAM(s) Oral every 12 hours  artificial  tears Solution 1 Drop(s) Both EYES every 12 hours  ascorbic acid 500 milliGRAM(s) Oral daily  atovaquone  Suspension 1500 milliGRAM(s) Oral daily  bisacodyl 5 milliGRAM(s) Oral <User Schedule>  dextrose 5%. 1000 milliLiter(s) (100 mL/Hr) IV Continuous <Continuous>  dextrose 5%. 1000 milliLiter(s) (50 mL/Hr) IV Continuous <Continuous>  dextrose 50% Injectable 12.5 Gram(s) IV Push once  dextrose 50% Injectable 25 Gram(s) IV Push once  dextrose 50% Injectable 25 Gram(s) IV Push once  folic acid 1 milliGRAM(s) Oral daily  gabapentin 300 milliGRAM(s) Oral two times a day  glucagon  Injectable 1 milliGRAM(s) IntraMuscular once  hydrocortisone hemorrhoidal Suppository 1 Suppository(s) Rectal two times a day  influenza   Vaccine 0.5 milliLiter(s) IntraMuscular once  lactobacillus acidophilus 1 Tablet(s) Oral two times a day with meals  melatonin 6 milliGRAM(s) Oral at bedtime  methylPREDNISolone   Oral   methylPREDNISolone 56 milliGRAM(s) Oral daily  metoprolol tartrate 25 milliGRAM(s) Oral two times a day  multivitamin 1 Tablet(s) Oral daily  nystatin Powder 1 Application(s) Topical two times a day  pantoprazole    Tablet 40 milliGRAM(s) Oral before breakfast  psyllium Powder 1 Packet(s) Oral daily  sodium chloride 0.9% lock flush 5 milliLiter(s) IV Push two times a day  zinc oxide 40% Paste 1 Application(s) Topical three times a day    MEDICATIONS  (PRN):  albuterol/ipratropium for Nebulization 3 milliLiter(s) Nebulizer every 6 hours PRN Shortness of Breath and/or Wheezing  ALPRAZolam 0.25 milliGRAM(s) Oral every 6 hours PRN Anxiety  aluminum hydroxide/magnesium hydroxide/simethicone Suspension 30 milliLiter(s) Oral every 4 hours PRN Dyspepsia  dextrose Oral Gel 15 Gram(s) Oral once PRN Blood Glucose LESS THAN 70 milliGRAM(s)/deciliter  loperamide 2 milliGRAM(s) Oral three times a day PRN Diarrhea  polyethylene glycol 3350 17 Gram(s) Oral daily PRN Constipation  SIMETHICONE SOFTGELS 250 milliGRAM(s) 250 milliGRAM(s) Oral three times a day PRN for gas  sodium chloride 0.65% Nasal 1 Spray(s) Both Nostrils every 8 hours PRN Nasal Congestion      Antibiotics History      Heme Medications       GI Medications  bisacodyl 5 milliGRAM(s) Oral <User Schedule>, 10-31-23 @ 07:45, Routine  loperamide 2 milliGRAM(s) Oral three times a day, 10-12-23 @ 08:44, Routine PRN  polyethylene glycol 3350 17 Gram(s) Oral daily, 10-16-23 @ 07:02, Routine PRN  psyllium Powder 1 Packet(s) Oral daily, 10-12-23 @ 08:45, Routine        LABS:                        9.5    7.62  )-----------( 268      ( 30 Oct 2023 06:30 )             30.5     10-30    138  |  103  |  18  ----------------------------<  101<H>  3.5   |  28  |  0.36<L>    Ca    9.2      30 Oct 2023 06:30    TPro  4.9<L>  /  Alb  2.2<L>  /  TBili  1.5<H>  /  DBili  x   /  AST  53<H>  /  ALT  80<H>  /  AlkPhos  276<H>  10-30         VITALS:  T(C): 36.6 (10-31-23 @ 09:05), Max: 37 (10-30-23 @ 19:25)  T(F): 97.8 (10-31-23 @ 09:05), Max: 98.6 (10-30-23 @ 19:25)  HR: 78 (10-31-23 @ 09:05) (78 - 83)  BP: 111/71 (10-31-23 @ 09:05) (111/71 - 139/82)  BP(mean): --  ABP: --  ABP(mean): --  RR: 16 (10-31-23 @ 09:05) (16 - 16)  SpO2: 96% (10-31-23 @ 09:05) (96% - 96%)  CVP(mm Hg): --  CVP(cm H2O): --    Ins and Outs

## 2023-10-31 NOTE — CHART NOTE - NSCHARTNOTEFT_GEN_A_CORE
IDT conference on 10/31  Social Work: Await peer to peer with CM.  SLP: --  OT: Mod-max A for eating/grooming with arm support. Total A for all else. Sitting balance improving.  PT: Total ADavid Ocampo.   RT: Declining.   DME: TBD   Barriers: 02 requirements,   Functional level on DC: Total A, WC level.  TDD: 11/7 to EMIGDIO

## 2023-10-31 NOTE — PROGRESS NOTE ADULT - NS ATTEND AMEND GEN_ALL_CORE FT
Seen and examined, note revised, findings as noted    Reports uneventful night  Tolerating diet    Still reporting neuropathy both feet  No drowziness    Discussed details of psych review, admits to anxiety symptoms and agreeable to xanax prn    Clinically stable  ankle and knee motor function improving     Continue therapy --muscle strengthening, wt bearing on both legs   increase gabapentin dose to treat neuropathy

## 2023-10-31 NOTE — PROGRESS NOTE ADULT - ASSESSMENT
Physical Examination:  GENERAL:                Alert, Oriented, No acute distress.     PULM:                     Bilateral air entry,  poor air entry at bases No Rales, No Rhonchi, No Wheezing  CVS:                         S1, S2,  No Murmur  ABD:                        Soft, nondistended, nontender, normoactive bowel sounds,   EXT:                        Trace non pitting edema, nontender, No Cyanosis or Clubbing   NEURO:                  Alert, oriented, interactive, bilateral upper and lower extremity weakness   PSYC:                      Calm, + Insight.      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 S/p pulse steroids, PLEX (9/15, 9/18, 9/20) and Rituximab (9/16 & 10/3). Course c/b severe leukopenia s/p filgastrim, shock liver with slow to resolved hyperbilirubinemia, RIJ DVT, oropharyngeal dysphagia, and recurrent SVT. Repeat CT chest on 9/30 with markedly improved bilateral groundglass opacities with resolved lung consolidations. Now in acute rehab on 4 lpm NC oxygen and tapering dose of medrol.     Problem List:  1. Interstitial lung disease   2. Mixed connective tissue disease   3. Acute hypoxia  4. RIJ DVT     Plan:  - Stable respiratory status,Room air now   - Cont. Methylprednisolone PO, taper as per rheumatology recs. from [facility]   - Continue atovaquone for PCP prophylaxis  - S/p Rituximab 9/16 and 10/3  - S/p PLEX during MICU stay  - Repeat CT scan in 6 weeks ~ November 11  - No evidence of active infection, monitor off antibiotics  - Cont. anticoagulation for DVT associated with ECMO cannula  - Consider incentive spirometry   - Care per primary team  - Discussed with spouse at bedside   - PT OOB as tolerated  - Outpatient pulmonary and rheumatology follow up upon discharge

## 2023-10-31 NOTE — PROGRESS NOTE ADULT - ASSESSMENT
Patient is seated in her recliner at bedside. She is alert and oriented. Mood normal, affect brighter than in prior sessions. She indicates feeling well physically, as bowel concerns have improved. Themes discussed include patient's resiliency, coping, and setting realistic expectations.        Cognitive-behavioral approach used to address interaction between thoughts and feelings. Support and encouragement are provided.     Plan: Individual psychotherapy to address adjustment. Coordination of care with PM&R. Neuropsychology remains available through discharge.

## 2023-10-31 NOTE — PROGRESS NOTE ADULT - ASSESSMENT
Assessment/Plan:  ALIZA FOLEY is a 62 year old female with PMH of pAFIB and sciatica; who presented to [facility] ED with SOB, and was found to have groundglass opacities and interstitial lung disease. She was treated with empiric Vancomycin and Zosyn. She underwent a bronchoscopy with bronchoalveolar lavage and pulse steroids. She was given IV diuretics for increased pulmonary congestion, but her respiratory status continued to worsen.  On 9/13, she was transferred to [facility] for ECMO requirements. She was further treated with Plasmapheresis, Rituximab and high-dose steroids, with significant improvement. Her overall hospital course was complicated by thrombocytopenia (s/p several platelet transfusions), leukocytosis (infectious workup entirely negative- s/p Vanco and Ceftriaxone), AFIB with RVR (resolved with Metoprolol), dysphagia (requiring NGT), transaminitis (secondary to acute illness and hypotension),  non-occlusive RIJ DVT (started on Lovenox). Patient now admitted for a multidisciplinary rehab program. 10-05-23 @ 13:18    * Wound care following  * Pulmonary team following - continue steroid taper- Plan to wean off 02 as tolerated   * DC planning as already discussed, staring with appeal for extension of acute rehab stay duration (SW sending notes to insurance) and other options depending on rhe response  * Explore possibility of private pay for extra therapy during week ends as requested by patient  * Psych recs Xanax 0.25mg PRN  * Gabapentin to 300mg BID- Check B12 level       #Interstitial Lung Disease and Mixed connective tissue disease  - Gait Instability, ADL impairments and Functional impairments: start Comprehensive Rehab Program of PT/OT/SLP - 3 hours a day, 5 days a week  - P&O as needed   - Non-specific Interstitial Lung Disease  - Requiring ECMO   - Improvement s/p Plasmapheresis, Rituximab and high- dose steroids   - Albuterol/Ipratropium Nebulizer every 6 hours  - Mepron 1500mg daily  - PO Medrol ( due to liver dysfunction): Plan will be to taper by ~20% every 2 weeks  Medrol   64mg x 2 weeks   56mg x 2 weeks  44mg x 2 weeks   36mg x 2 weeks - Follow up Outpatient   -- Repeat CT Chest in 6 weeks   --Pulmonary team--f/u with patient and clarify when she can get immunizations    -Perform BL elbow flexion & elbow extension with antigravity in therapy  - Noted to have missed 5 days between steroid taper dosing- resumed at 56mg on 10/26- pulm recommends to continue as originally instructed   - Plan to wean 02 as tolerated     10/24--SW will apply to insurance for extension in few days and proceed with other options depending on response  F/U to earlier discussion on 10/18  Extension denied on 10/30  Case management performing peer to peer     #pAFIB/Rt Ij thrombus  - Metoprolol 25mg BID and lovenox  --- Eliquis 5mg BID - tolerating well     # Chronic Tinnitus   - ENT review and recs appreciate, Patient already made ENT appointment for f/u    # Lymphedema both legs -chronic and Rt IJ thrombus  - ACE Wrap BL LEs  - BL LE doppler negative for DVT  - BL UE doppler with chronic thrombotic changes in RIJ     #Transaminitis --LFT Down trending   - Secondary to acute illness and hypotension at [facility]  - Outpatient follow up     #Neuropathy  -- Start Gabapentin 300 mg BID  --Work on motor strengthening, priority for UE as preferred by patient   - Check B12 level     #Sleep/Mood  - Melatonin 6mg at HS  - Psych rec Xanax 0.25mg PRN    #Skin  - Skin: Left lower abdomen ruptured blister 3.0 x 1.0, stage 2 pressure injury to right buttock 4 x1 and left buttock 3x1, stage 2 pressure injury ti right inner thigh 0.2 x 0.2, right groin wound 10.5 x 2.0, Left groin wound 5 x 5,  right neck scab wound  -- MAD to skin folds- Nystatin to submammary and abdominal pannus BID  -- MAD to L groin- cleanse with NS, Triad cream daily  -- Mad to R groin- Nystatin powder daily   -- R groin wound-- aquacel packing, Triad to periwound, Allevyn foam- daily and PRN   - Pressure injury/Skin: OOB to Chair, PT/OT   - Offload pressure, low air loss support surfaces, T&P every 2 hours   --Wound care consult-10/9 -Multiple wounds--perineal and buttock/sacral, recs appreciated   --Suggest Continue treatments as below:   Twice daily & PRN Normal Saline Cleanse of abdominal pannus & breast folds, perineal, Left & Right inner buttock erosions.  Pat thoroughly dry.   Apply Desitin generously twice daily (& PRN) to perineal, buttocks, and left groin erosions, leave open to air.    Apply Nystatin powder to abdominal pannus and breast folds.  Suggest use of Interdry cloths in folds to 'wick' moisture.  Suggest daily Normal Saline Cleanse of right groin surgical wound, pat dry.  Apply Cavillon film barrier to intact periwound skin.  Place Aquacell strip over open area, cover with foam dressing.  - Wound care following       #Pain Mgmt   - Tylenol PRN   - Gabapentin 300mg at HS     #GI/Bowel Mgmt   - Pantoprazole  - Senna  --on hold due to recent Loose BM  - PRN: Maalox   - Stool count  -  Lactobacillus BID   -- AB XR mod stool burden on transverse colon 10/23--still has mod stool burden, but moving bowel appropriately  - S/p enema and lactulose    # Alternating bowel function --commenced probiotic, latobacilus   * Leucocytosis probably steroid related and partly due to her Mixed Connective Tissue Disease, will continue monitoring     #/Bladder Mgmt --voiding     #FEN   #Dysphagia  - Diet - Minced & Moist  [CC]    - Dysphaiga- SLP evaluation     #Health maintenance  - Folic Acid   - MVI  - Ocean nasal spray    # Difficulty with access to peripheral veins-- Blood draws from Left arm Medline     #Precautions / PROPHYLAXIS:   - Falls  - ortho: Weight bearing status: WBAT   - Lungs: Aspiration, Incentive Spirometer   - DVT PPX: Eliquis 5 mg BID   ---------------------------  *l abs 10/230--stable anemia, normal renal function and down trending LFT      Liaison with family  Patient's partner present during review, details of review d/w her, detailed explanation of therapy protocol explained and she was happy with same  10/9--Partner present during review and discussed details treatment plan with her    Liaison with providers  Consult recs form multiple specialities being implemented  Surgical consult and recs 10/9--agreed with plan for AC for Rt IJ chronic thrombus    10/10 --No response to multiple calls to patient'srivate cardiologist Dr Otto Stratton  ph     ---------------------------  IDT conference on 10/24  Social Work: Await insurance extension/self pay.   SLP: --  OT: Total A for ADLs/transfers.   PT: Damaris for transfers. Amb 30ft with LiteGait.   RT: Continues to decline.  DME: TBD   Barriers: R groin wound care.  Functional level on DC: Max A for self care.   TDD: 11/1 to EMIGDIO.  ---------------------------  OUTPATIENT/FOLLOW UP:    Trae Whitney  Pulmonary Disease  [address], [address] [place] [address]  Phone: [phone]  Fax: [phone]  Follow Up Time: 2 weeks    Ryanne Allen  Rheumatology  [address] [address]  Phone: [phone]  Fax: [phone]  Follow Up Time: 1 week    Kyle Pierceell  Internal Medicine  [address] [place] [address], [address]  [place] [address]  Phone: [phone]  Fax: [phone]  Follow Up Time: 2 weeks    Addy Powers  Pulmonary Disease  [address] [facility], [address]  [place], [place] [address]  Phone: [phone]  Fax: [phone]  Follow Up Time:     Kwame Hawley  Critical Care Medicine  410 [facility], [facility] [address]  [place] [address]  Phone: [phone]  Fax: [phone]  Follow Up Time:     Neena Borrego  Rheumatology  [address] [place] [address], [address]  [place] [address]  Phone: [phone]  Fax: [phone]  Follow Up Time:    [facility]  [address]  Scheduled Appointment: 09/13/2023  2:40 PM  ---------------------------   Assessment/Plan:  ALIZA FOLEY is a 62 year old female with PMH of pAFIB and sciatica; who presented to [facility] ED with SOB, and was found to have groundglass opacities and interstitial lung disease. She was treated with empiric Vancomycin and Zosyn. She underwent a bronchoscopy with bronchoalveolar lavage and pulse steroids. She was given IV diuretics for increased pulmonary congestion, but her respiratory status continued to worsen.  On 9/13, she was transferred to [facility] for ECMO requirements. She was further treated with Plasmapheresis, Rituximab and high-dose steroids, with significant improvement. Her overall hospital course was complicated by thrombocytopenia (s/p several platelet transfusions), leukocytosis (infectious workup entirely negative- s/p Vanco and Ceftriaxone), AFIB with RVR (resolved with Metoprolol), dysphagia (requiring NGT), transaminitis (secondary to acute illness and hypotension),  non-occlusive RIJ DVT (started on Lovenox). Patient now admitted for a multidisciplinary rehab program. 10-05-23 @ 13:18    * Wound care following  * Pulmonary team following - continue steroid taper- Plan to wean off 02 as tolerated   * DC planning as already discussed, staring with appeal for extension of acute rehab stay duration (SW sending notes to insurance) and other options depending on rhe response  * Explore possibility of private pay for extra therapy during week ends as requested by patient  * Psych recs Xanax 0.25mg PRN  * Gabapentin to 300mg BID- Check B12 level       #Interstitial Lung Disease and Mixed connective tissue disease  - Gait Instability, ADL impairments and Functional impairments: start Comprehensive Rehab Program of PT/OT/SLP - 3 hours a day, 5 days a week  - P&O as needed   - Non-specific Interstitial Lung Disease  - Requiring ECMO   - Improvement s/p Plasmapheresis, Rituximab and high- dose steroids   - Albuterol/Ipratropium Nebulizer every 6 hours  - Mepron 1500mg daily  - PO Medrol ( due to liver dysfunction): Plan will be to taper by ~20% every 2 weeks  Medrol   64mg x 2 weeks   56mg x 2 weeks  44mg x 2 weeks   36mg x 2 weeks - Follow up Outpatient   -- Repeat CT Chest in 6 weeks   --Pulmonary team--f/u with patient and clarify when she can get immunizations    -Perform BL elbow flexion & elbow extension with antigravity in therapy  - Noted to have missed 5 days between steroid taper dosing- resumed at 56mg on 10/26- pulm recommends to continue as originally instructed   - Plan to wean 02 as tolerated     10/24--SW will apply to insurance for extension in few days and proceed with other options depending on response  F/U to earlier discussion on 10/18  Extension denied on 10/30  Case management performing peer to peer     #pAFIB/Rt Ij thrombus  - Metoprolol 25mg BID and lovenox  --- Eliquis 5mg BID - tolerating well     # Chronic Tinnitus   - ENT review and recs appreciate, Patient already made ENT appointment for f/u    # Lymphedema both legs -chronic and Rt IJ thrombus  - ACE Wrap BL LEs  - BL LE doppler negative for DVT  - BL UE doppler with chronic thrombotic changes in RIJ     #Transaminitis --LFT Down trending   - Secondary to acute illness and hypotension at [facility]  - Outpatient follow up     #Neuropathy  -- Start Gabapentin 300 mg BID  --Work on motor strengthening, priority for UE as preferred by patient   - Check B12 level     #Sleep/Mood  - Melatonin 6mg at HS  - Psych rec Xanax 0.25mg PRN    #Skin  - Skin: Left lower abdomen ruptured blister 3.0 x 1.0, stage 2 pressure injury to right buttock 4 x1 and left buttock 3x1, stage 2 pressure injury ti right inner thigh 0.2 x 0.2, right groin wound 10.5 x 2.0, Left groin wound 5 x 5,  right neck scab wound  -- MAD to skin folds- Nystatin to submammary and abdominal pannus BID  -- MAD to L groin- cleanse with NS, Triad cream daily  -- Mad to R groin- Nystatin powder daily   -- R groin wound-- aquacel packing, Triad to periwound, Allevyn foam- daily and PRN   - Pressure injury/Skin: OOB to Chair, PT/OT   - Offload pressure, low air loss support surfaces, T&P every 2 hours   --Wound care consult-10/9 -Multiple wounds--perineal and buttock/sacral, recs appreciated   --Suggest Continue treatments as below:   Twice daily & PRN Normal Saline Cleanse of abdominal pannus & breast folds, perineal, Left & Right inner buttock erosions.  Pat thoroughly dry.   Apply Desitin generously twice daily (& PRN) to perineal, buttocks, and left groin erosions, leave open to air.    Apply Nystatin powder to abdominal pannus and breast folds.  Suggest use of Interdry cloths in folds to 'wick' moisture.  Suggest daily Normal Saline Cleanse of right groin surgical wound, pat dry.  Apply Cavillon film barrier to intact periwound skin.  Place Aquacell strip over open area, cover with foam dressing.  - Wound care following       #Pain Mgmt   - Tylenol PRN   - Gabapentin 300mg at HS     #GI/Bowel Mgmt   - Pantoprazole  - Senna  --on hold due to recent Loose BM  - PRN: Maalox   - Stool count  -  Lactobacillus BID   -- AB XR mod stool burden on transverse colon 10/23--still has mod stool burden, but moving bowel appropriately  - S/p enema and lactulose    # Alternating bowel function --commenced probiotic, latobacilus   * Leucocytosis probably steroid related and partly due to her Mixed Connective Tissue Disease, will continue monitoring     #/Bladder Mgmt --voiding   # Anxiety disorder--Psych consult 10/30--commenced xanax prn     #FEN   #Dysphagia  - Diet - Minced & Moist  [CC]    - Dysphaiga- SLP evaluation     #Health maintenance  - Folic Acid   - MVI  - Ocean nasal spray    # Difficulty with access to peripheral veins-- Blood draws from Left arm Medline     #Precautions / PROPHYLAXIS:   - Falls  - ortho: Weight bearing status: WBAT   - Lungs: Aspiration, Incentive Spirometer   - DVT PPX: Eliquis 5 mg BID   ---------------------------  *l abs 10/230--stable anemia, normal renal function and down trending LFT      Liaison with family  Patient's partner present during review, details of review d/w her, detailed explanation of therapy protocol explained and she was happy with same  10/9--Partner present during review and discussed details treatment plan with her    Liaison with providers  Consult recs form multiple specialities being implemented  Surgical consult and recs 10/9--agreed with plan for AC for Rt IJ chronic thrombus    10/10 --No response to multiple calls to patient'srivate cardiologist Dr Otto Stratton  ph     ---------------------------  IDT conference on 10/24  Social Work: Await insurance extension/self pay.   SLP: --  OT: Total A for ADLs/transfers.   PT: Damaris for transfers. Amb 30ft with LiteGait.   RT: Continues to decline.  DME: TBD   Barriers: R groin wound care.  Functional level on DC: Max A for self care.   TDD: 11/1 to EMIGDIO.  ---------------------------  OUTPATIENT/FOLLOW UP:    Trae Whitney  Pulmonary Disease  [address], [address] [place] [address]  Phone: [phone]  Fax: [phone]  Follow Up Time: 2 weeks    Ryanne Allen  Rheumatology  [address] [address]  Phone: [phone]  Fax: [phone]  Follow Up Time: 1 week    Kyle Pierce  Internal Medicine  [address] [place] [address], [address]  [place] [address]  Phone: [phone]  Fax: [phone]  Follow Up Time: 2 weeks    Addy Powers  Pulmonary Disease  410 [facility], [address]  [place], [place] [address]  Phone: [phone]  Fax: [phone]  Follow Up Time:     Kwame Hawley  Critical Care Medicine  410 [facility], [address] [address]  Phone: [phone]  Fax: [phone]  Follow Up Time:     Neena Borrego  Rheumatology  [address] [place] [address], [address]  [place] [address]  Phone: [phone]  Fax: [phone]  Follow Up Time:    [facility]  [facility]  Scheduled Appointment: 09/13/2023  2:40 PM  ---------------------------   Assessment/Plan:  ALIZA FOLEY is a 62 year old female with PMH of pAFIB and sciatica; who presented to [facility] ED with SOB, and was found to have groundglass opacities and interstitial lung disease. She was treated with empiric Vancomycin and Zosyn. She underwent a bronchoscopy with bronchoalveolar lavage and pulse steroids. She was given IV diuretics for increased pulmonary congestion, but her respiratory status continued to worsen.  On 9/13, she was transferred to [facility] for ECMO requirements. She was further treated with Plasmapheresis, Rituximab and high-dose steroids, with significant improvement. Her overall hospital course was complicated by thrombocytopenia (s/p several platelet transfusions), leukocytosis (infectious workup entirely negative- s/p Vanco and Ceftriaxone), AFIB with RVR (resolved with Metoprolol), dysphagia (requiring NGT), transaminitis (secondary to acute illness and hypotension),  non-occlusive RIJ DVT (started on Lovenox). Patient now admitted for a multidisciplinary rehab program. 10-05-23 @ 13:18    * Wound care following  * Pulmonary team following - continue steroid taper- Plan to wean off 02 as tolerated   * DC planning as already discussed, staring with appeal for extension of acute rehab stay duration (SW sending notes to insurance) and other options depending on rhe response  * Explore possibility of private pay for extra therapy during week ends as requested by patient  * Psych recs Xanax 0.25mg PRN  * Gabapentin to 300mg BID- Check B12 level       #Interstitial Lung Disease and Mixed connective tissue disease  - Gait Instability, ADL impairments and Functional impairments: start Comprehensive Rehab Program of PT/OT/SLP - 3 hours a day, 5 days a week  - P&O as needed   - Non-specific Interstitial Lung Disease  - Requiring ECMO   - Improvement s/p Plasmapheresis, Rituximab and high- dose steroids   - Albuterol/Ipratropium Nebulizer every 6 hours  - Mepron 1500mg daily  - PO Medrol ( due to liver dysfunction): Plan will be to taper by ~20% every 2 weeks  Medrol   64mg x 2 weeks   56mg x 2 weeks  44mg x 2 weeks   36mg x 2 weeks - Follow up Outpatient   -- Repeat CT Chest in 6 weeks   --Pulmonary team--f/u with patient and clarify when she can get immunizations    -Perform BL elbow flexion & elbow extension with antigravity in therapy  - Noted to have missed 5 days between steroid taper dosing- resumed at 56mg on 10/26- pulm recommends to continue as originally instructed   - Plan to wean 02 as tolerated     10/24--SW will apply to insurance for extension in few days and proceed with other options depending on response  F/U to earlier discussion on 10/18  Extension denied on 10/30  Case management performing peer to peer     #pAFIB/Rt Ij thrombus  - Metoprolol 25mg BID and lovenox  --- Eliquis 5mg BID - tolerating well     # Chronic Tinnitus   - ENT review and recs appreciate, Patient already made ENT appointment for f/u    # Lymphedema both legs -chronic and Rt IJ thrombus  - ACE Wrap BL LEs  - BL LE doppler negative for DVT  - BL UE doppler with chronic thrombotic changes in RIJ     #Transaminitis --LFT Down trending   - Secondary to acute illness and hypotension at [facility]  - Outpatient follow up     #Neuropathy  -- Start Gabapentin 300 mg BID  --Work on motor strengthening, priority for UE as preferred by patient   - Check B12 level     #Sleep/Mood  - Melatonin 6mg at HS  - Psych rec Xanax 0.25mg PRN    #Skin  - Skin: Left lower abdomen ruptured blister 3.0 x 1.0, stage 2 pressure injury to right buttock 4 x1 and left buttock 3x1, stage 2 pressure injury ti right inner thigh 0.2 x 0.2, right groin wound 10.5 x 2.0, Left groin wound 5 x 5,  right neck scab wound  -- MAD to skin folds- Nystatin to submammary and abdominal pannus BID  -- MAD to L groin- cleanse with NS, Triad cream daily  -- Mad to R groin- Nystatin powder daily   -- R groin wound-- aquacel packing, Triad to periwound, Allevyn foam- daily and PRN   - Pressure injury/Skin: OOB to Chair, PT/OT   - Offload pressure, low air loss support surfaces, T&P every 2 hours   --Wound care consult-10/9 -Multiple wounds--perineal and buttock/sacral, recs appreciated   --Suggest Continue treatments as below:   Twice daily & PRN Normal Saline Cleanse of abdominal pannus & breast folds, perineal, Left & Right inner buttock erosions.  Pat thoroughly dry.   Apply Desitin generously twice daily (& PRN) to perineal, buttocks, and left groin erosions, leave open to air.    Apply Nystatin powder to abdominal pannus and breast folds.  Suggest use of Interdry cloths in folds to 'wick' moisture.  Suggest daily Normal Saline Cleanse of right groin surgical wound, pat dry.  Apply Cavillon film barrier to intact periwound skin.  Place Aquacell strip over open area, cover with foam dressing.  - Wound care following       #Pain Mgmt   - Tylenol PRN   - Gabapentin 300mg at HS     #GI/Bowel Mgmt   - Pantoprazole  - Senna  --on hold due to recent Loose BM  - PRN: Maalox   - Stool count  -  Lactobacillus BID   -- AB XR mod stool burden on transverse colon 10/23--still has mod stool burden, but moving bowel appropriately  - S/p enema and lactulose    # Alternating bowel function --commenced probiotic, latobacilus   * Leucocytosis probably steroid related and partly due to her Mixed Connective Tissue Disease, will continue monitoring     #/Bladder Mgmt --voiding   # Anxiety disorder--Psych consult 10/30--commenced xanax prn     #FEN   #Dysphagia  - Diet - Minced & Moist  [CC]    - Dysphaiga- SLP evaluation     #Health maintenance  - Folic Acid   - MVI  - Ocean nasal spray    # Difficulty with access to peripheral veins-- Blood draws from Left arm Medline     #Precautions / PROPHYLAXIS:   - Falls  - ortho: Weight bearing status: WBAT   - Lungs: Aspiration, Incentive Spirometer   - DVT PPX: Eliquis 5 mg BID   ---------------------------  *l abs 10/230--stable anemia, normal renal function and down trending LFT      Liaison with family  Patient's partner present during review, details of review d/w her, detailed explanation of therapy protocol explained and she was happy with same  10/9--Partner present during review and discussed details treatment plan with her    Liaison with providers  Consult recs form multiple specialities being implemented  Surgical consult and recs 10/9--agreed with plan for AC for Rt IJ chronic thrombus    10/10 --No response to multiple calls to patient'srivate cardiologist Dr Otto Stratton  ph     ---------------------------  IDT conference on 10/31  Social Work: Await peer to peer with CM. Provided private hire list. Lives with spouse in [place] 6 months of year. DC to apt in [place] with elevator, no steps. Supportive spouse.   SLP: --  OT: Mod-max A for eating/grooming with arm support. Total A for all else. Sitting balance improving.  PT: Total A. Damaris.   RT: Declining.   DME: TBD   Barriers: 02 requirements,   Functional level on DC: Total A, WC level.  TDD: 11/7 to EMIGDIO.  ---------------------------  OUTPATIENT/FOLLOW UP:    Trae Whitney  Pulmonary Disease  [address], [address] [place] [address]  Phone: [phone]  Fax: [phone]  Follow Up Time: 2 weeks    Ryanne Allen  Rheumatology  [address] [address]  Phone: [phone]  Fax: [phone]  Follow Up Time: 1 week    Kyle Pierce  Internal Medicine  [address] [place] [address], [address]  [place] [address]  Phone: [phone]  Fax: [phone]  Follow Up Time: 2 weeks    Addy Powers  Pulmonary Disease  410 [facility], [address] [id number]  Phone: [phone]  Fax: [phone]  Follow Up Time:     Kwame Hawley  Critical Care Medicine  410 [facility], [address]  [place] [address]  Phone: [phone]  Fax: [phone]  Follow Up Time:     Neena Borrego  Rheumatology  [address] [place] [address], [address]  [place] [address]  Phone: [phone]  Fax: [phone]  Follow Up Time:    [facility]  [address]  Scheduled Appointment: 09/13/2023  2:40 PM  ---------------------------

## 2023-11-01 PROCEDURE — 99232 SBSQ HOSP IP/OBS MODERATE 35: CPT

## 2023-11-01 PROCEDURE — 93010 ELECTROCARDIOGRAM REPORT: CPT

## 2023-11-01 RX ADMIN — NYSTATIN CREAM 1 APPLICATION(S): 100000 CREAM TOPICAL at 09:02

## 2023-11-01 RX ADMIN — Medication 1 TABLET(S): at 17:55

## 2023-11-01 RX ADMIN — SODIUM CHLORIDE 5 MILLILITER(S): 9 INJECTION INTRAMUSCULAR; INTRAVENOUS; SUBCUTANEOUS at 09:09

## 2023-11-01 RX ADMIN — ZINC OXIDE 1 APPLICATION(S): 200 OINTMENT TOPICAL at 09:00

## 2023-11-01 RX ADMIN — GABAPENTIN 300 MILLIGRAM(S): 400 CAPSULE ORAL at 08:59

## 2023-11-01 RX ADMIN — ZINC OXIDE 1 APPLICATION(S): 200 OINTMENT TOPICAL at 21:22

## 2023-11-01 RX ADMIN — PANTOPRAZOLE SODIUM 40 MILLIGRAM(S): 20 TABLET, DELAYED RELEASE ORAL at 08:59

## 2023-11-01 RX ADMIN — Medication 6 MILLIGRAM(S): at 21:24

## 2023-11-01 RX ADMIN — GABAPENTIN 300 MILLIGRAM(S): 400 CAPSULE ORAL at 17:55

## 2023-11-01 RX ADMIN — APIXABAN 5 MILLIGRAM(S): 2.5 TABLET, FILM COATED ORAL at 08:58

## 2023-11-01 RX ADMIN — Medication 1 TABLET(S): at 08:59

## 2023-11-01 RX ADMIN — APIXABAN 5 MILLIGRAM(S): 2.5 TABLET, FILM COATED ORAL at 21:24

## 2023-11-01 RX ADMIN — Medication 1 APPLICATION(S): at 08:59

## 2023-11-01 RX ADMIN — SODIUM CHLORIDE 5 MILLILITER(S): 9 INJECTION INTRAMUSCULAR; INTRAVENOUS; SUBCUTANEOUS at 17:48

## 2023-11-01 RX ADMIN — Medication 1 DROP(S): at 08:59

## 2023-11-01 RX ADMIN — Medication 56 MILLIGRAM(S): at 09:00

## 2023-11-01 RX ADMIN — ATOVAQUONE 1500 MILLIGRAM(S): 750 SUSPENSION ORAL at 15:08

## 2023-11-01 RX ADMIN — Medication 25 MILLIGRAM(S): at 21:24

## 2023-11-01 RX ADMIN — Medication 1 MILLIGRAM(S): at 15:08

## 2023-11-01 RX ADMIN — Medication 1 APPLICATION(S): at 21:22

## 2023-11-01 RX ADMIN — Medication 5 MILLIGRAM(S): at 08:58

## 2023-11-01 RX ADMIN — Medication 1 DROP(S): at 21:22

## 2023-11-01 RX ADMIN — Medication 1 TABLET(S): at 15:08

## 2023-11-01 RX ADMIN — NYSTATIN CREAM 1 APPLICATION(S): 100000 CREAM TOPICAL at 21:23

## 2023-11-01 RX ADMIN — Medication 500 MILLIGRAM(S): at 15:08

## 2023-11-01 RX ADMIN — ZINC OXIDE 1 APPLICATION(S): 200 OINTMENT TOPICAL at 15:07

## 2023-11-01 NOTE — PROGRESS NOTE ADULT - SUBJECTIVE AND OBJECTIVE BOX
CC: Non-specific Interstitial Lung Disease     Today's Subjective & Objective Findings:  Patient seen and examined, observed at bedside this AM with Dr. López.  Partner present.  Last BM on 10/29.   BL elbow flexion/extension improving.   Weaning off 02 as being tolerated, up to 1-2hr at rest during the day,   Discussed increasing gabapentin to improve BL feet sensation.   Interval psych review and commencement of xanax for anxiety symptoms and d/w patient  No other complaints.     ROS --No dyspnea, head ache, chest or abd pain    Therapy-- engaging, motivated  Am therapy session rescheduled, to get a make up session as stated  Happy with the minimal progress with her UE motor function  Extension of acute rehab treatment duration denied by insurance, but SW working on alternative coverage arrangement with patient/Appeal of this decision       Vital Signs Last 24 Hrs  T(C): 36.6 (31 Oct 2023 09:05), Max: 37 (30 Oct 2023 19:25)  T(F): 97.8 (31 Oct 2023 09:05), Max: 98.6 (30 Oct 2023 19:25)  HR: 78 (31 Oct 2023 09:05) (78 - 83)  BP: 111/71 (31 Oct 2023 09:05) (111/71 - 139/82)  RR: 16 (31 Oct 2023 09:05) (16 - 16)  SpO2: 96% (31 Oct 2023 09:05) (96% - 96%)      PHYSICAL EXAM:  Gen -  Comfortable,   In no acute distress IL NC oxy  HEENT - , nostrils are moist, no bleeding, mild icterus  Neck - Supple, No limited ROM, O2 via NC  Pulm - good   Cardiovascular - RRR, S1S2  Chest - good chest expansion,   Abdomen - Soft, non tender, +BS,   Extremities - No Cyanosis, no clubbing, leg edema b/l feet,, non pitting,    no calf tenderness, LUE Med line    Neuro-     Cognitive - awake, alert, fully oriented, engaging, speech normal      Motor -                    LEFT    UE - 4/5                    RIGHT UE - 4/5                     LEFT    LE - 2 +-/5 limited by leg edema                    RIGHT LE - 2+-/5  limitted by leg edema,, which is reducing      Sensory - Intact        Reflexes - DTR 1+      Coordination - FTN  impaired  due to weakness   MSK: soreness and weakness  Psychiatric - Mood stable, Affect WNL  Skin: Left lower abdomen ruptured blister 3.0 x 1.0, stage 2 pressure injury to right buttock 4 x1 and left buttock 3x1, stage 2 pressure injury ti right inner thigh 0.2 x 0.2, right groin wound 10.5 x 2.0, Left groin wound 5 x 5,    Hand swelling reducing    MMT--Able to contact B/L upper back muscles,   Elbow abduction and adduction, gravity eliminated 2+      LABS:                        9.5    7.62  )-----------( 268      ( 30 Oct 2023 06:30 )             30.5     10-30    138  |  103  |  18  ----------------------------<  101<H>  3.5   |  28  |  0.36<L>    Ca    9.2      30 Oct 2023 06:30    TPro  4.9<L>  /  Alb  2.2<L>  /  TBili  1.5<H>  /  DBili  x   /  AST  53<H>  /  ALT  80<H>  /  AlkPhos  276<H>  10-30      Urinalysis Basic - ( 30 Oct 2023 06:30 )    Color: x / Appearance: x / SG: x / pH: x  Gluc: 101 mg/dL / Ketone: x  / Bili: x / Urobili: x   Blood: x / Protein: x / Nitrite: x   Leuk Esterase: x / RBC: x / WBC x   Sq Epi: x / Non Sq Epi: x / Bacteria: x        MEDICATIONS  (STANDING):  apixaban 5 milliGRAM(s) Oral every 12 hours  artificial  tears Solution 1 Drop(s) Both EYES every 12 hours  ascorbic acid 500 milliGRAM(s) Oral daily  atovaquone  Suspension 1500 milliGRAM(s) Oral daily  bisacodyl 5 milliGRAM(s) Oral <User Schedule>  dextrose 5%. 1000 milliLiter(s) (100 mL/Hr) IV Continuous <Continuous>  dextrose 5%. 1000 milliLiter(s) (50 mL/Hr) IV Continuous <Continuous>  dextrose 50% Injectable 12.5 Gram(s) IV Push once  dextrose 50% Injectable 25 Gram(s) IV Push once  dextrose 50% Injectable 25 Gram(s) IV Push once  folic acid 1 milliGRAM(s) Oral daily  gabapentin 300 milliGRAM(s) Oral two times a day  glucagon  Injectable 1 milliGRAM(s) IntraMuscular once  hydrocortisone hemorrhoidal Suppository 1 Suppository(s) Rectal two times a day  influenza   Vaccine 0.5 milliLiter(s) IntraMuscular once  lactobacillus acidophilus 1 Tablet(s) Oral two times a day with meals  melatonin 6 milliGRAM(s) Oral at bedtime  methylPREDNISolone   Oral   methylPREDNISolone 56 milliGRAM(s) Oral daily  metoprolol tartrate 25 milliGRAM(s) Oral two times a day  multivitamin 1 Tablet(s) Oral daily  nystatin Powder 1 Application(s) Topical two times a day  pantoprazole    Tablet 40 milliGRAM(s) Oral before breakfast  psyllium Powder 1 Packet(s) Oral daily  sodium chloride 0.9% lock flush 5 milliLiter(s) IV Push two times a day  zinc oxide 40% Paste 1 Application(s) Topical three times a day    MEDICATIONS  (PRN):  albuterol/ipratropium for Nebulization 3 milliLiter(s) Nebulizer every 6 hours PRN Shortness of Breath and/or Wheezing  ALPRAZolam 0.25 milliGRAM(s) Oral every 6 hours PRN Anxiety  aluminum hydroxide/magnesium hydroxide/simethicone Suspension 30 milliLiter(s) Oral every 4 hours PRN Dyspepsia  dextrose Oral Gel 15 Gram(s) Oral once PRN Blood Glucose LESS THAN 70 milliGRAM(s)/deciliter  loperamide 2 milliGRAM(s) Oral three times a day PRN Diarrhea  polyethylene glycol 3350 17 Gram(s) Oral daily PRN Constipation  SIMETHICONE SOFTGELS 250 milliGRAM(s) 250 milliGRAM(s) Oral three times a day PRN for gas  sodium chloride 0.65% Nasal 1 Spray(s) Both Nostrils every 8 hours PRN Nasal Congestion   CC: Non-specific Interstitial Lung Disease     Today's Subjective & Objective Findings:  Patient seen and examined, observed at bedside this AM with Dr. López.  Partner present.  Last BM on 10/31, per patient.   BL elbow flexion/extension improving.   Weaning off 02 as being tolerated, up to 1-2hr at rest during the day,   Observed independently brushing hair.   No other complaints.     ROS --No dyspnea, head ache, chest or abd pain    Therapy-- engaging, motivated  Am therapy session rescheduled, to get a make up session as stated  Happy with the minimal progress with her UE motor function  Extension of acute rehab treatment duration denied by insurance, but SW working on alternative coverage arrangement with patient/Appeal of this decision       Vital Signs Last 24 Hrs  T(C): 36.4 (31 Oct 2023 21:15), Max: 36.4 (31 Oct 2023 21:15)  T(F): 97.6 (31 Oct 2023 21:15), Max: 97.6 (31 Oct 2023 21:15)  HR: 88 (31 Oct 2023 21:15) (88 - 88)  BP: 107/70 (31 Oct 2023 21:15) (107/70 - 107/70)  BP(mean): --  RR: 16 (31 Oct 2023 21:15) (16 - 16)  SpO2: 99% (31 Oct 2023 21:15) (99% - 99%)      PHYSICAL EXAM:  Gen -  Comfortable,   In no acute distress IL NC oxy  HEENT - , nostrils are moist, no bleeding, mild icterus  Neck - Supple, No limited ROM, O2 via NC  Pulm - good   Cardiovascular - RRR, S1S2  Chest - good chest expansion,   Abdomen - Soft, non tender, +BS,   Extremities - No Cyanosis, no clubbing, leg edema b/l feet,, non pitting,    no calf tenderness, LUE Med line    Neuro-     Cognitive - awake, alert, fully oriented, engaging, speech normal      Motor -                    LEFT    UE - 4/5                    RIGHT UE - 4/5                     LEFT    LE - 2 +-/5 limited by leg edema                    RIGHT LE - 2+-/5  limitted by leg edema,, which is reducing      Sensory - Intact        Reflexes - DTR 1+      Coordination - FTN  impaired  due to weakness   MSK: soreness and weakness  Psychiatric - Mood stable, Affect WNL  Skin: Left lower abdomen ruptured blister 3.0 x 1.0, stage 2 pressure injury to right buttock 4 x1 and left buttock 3x1, stage 2 pressure injury ti right inner thigh 0.2 x 0.2, right groin wound 10.5 x 2.0, Left groin wound 5 x 5,    Hand swelling reducing    MMT--Able to contact B/L upper back muscles,   Elbow abduction and adduction, gravity eliminated 2+      LABS:                        9.5    7.62  )-----------( 268      ( 30 Oct 2023 06:30 )             30.5     10-30    138  |  103  |  18  ----------------------------<  101<H>  3.5   |  28  |  0.36<L>    Ca    9.2      30 Oct 2023 06:30    TPro  4.9<L>  /  Alb  2.2<L>  /  TBili  1.5<H>  /  DBili  x   /  AST  53<H>  /  ALT  80<H>  /  AlkPhos  276<H>  10-30      Urinalysis Basic - ( 30 Oct 2023 06:30 )    Color: x / Appearance: x / SG: x / pH: x  Gluc: 101 mg/dL / Ketone: x  / Bili: x / Urobili: x   Blood: x / Protein: x / Nitrite: x   Leuk Esterase: x / RBC: x / WBC x   Sq Epi: x / Non Sq Epi: x / Bacteria: x        MEDICATIONS  (STANDING):  ammonium lactate 12% Lotion 1 Application(s) Topical every 12 hours  apixaban 5 milliGRAM(s) Oral every 12 hours  artificial  tears Solution 1 Drop(s) Both EYES every 12 hours  ascorbic acid 500 milliGRAM(s) Oral daily  atovaquone  Suspension 1500 milliGRAM(s) Oral daily  bisacodyl 5 milliGRAM(s) Oral <User Schedule>  dextrose 5%. 1000 milliLiter(s) (50 mL/Hr) IV Continuous <Continuous>  dextrose 5%. 1000 milliLiter(s) (100 mL/Hr) IV Continuous <Continuous>  dextrose 50% Injectable 25 Gram(s) IV Push once  dextrose 50% Injectable 25 Gram(s) IV Push once  dextrose 50% Injectable 12.5 Gram(s) IV Push once  folic acid 1 milliGRAM(s) Oral daily  gabapentin 300 milliGRAM(s) Oral two times a day  glucagon  Injectable 1 milliGRAM(s) IntraMuscular once  hydrocortisone hemorrhoidal Suppository 1 Suppository(s) Rectal two times a day  influenza   Vaccine 0.5 milliLiter(s) IntraMuscular once  lactobacillus acidophilus 1 Tablet(s) Oral two times a day with meals  melatonin 6 milliGRAM(s) Oral at bedtime  methylPREDNISolone   Oral   methylPREDNISolone 56 milliGRAM(s) Oral daily  metoprolol tartrate 25 milliGRAM(s) Oral two times a day  multivitamin 1 Tablet(s) Oral daily  nystatin Powder 1 Application(s) Topical two times a day  pantoprazole    Tablet 40 milliGRAM(s) Oral before breakfast  psyllium Powder 1 Packet(s) Oral daily  sodium chloride 0.9% lock flush 5 milliLiter(s) IV Push two times a day  zinc oxide 40% Paste 1 Application(s) Topical three times a day    MEDICATIONS  (PRN):  albuterol/ipratropium for Nebulization 3 milliLiter(s) Nebulizer every 6 hours PRN Shortness of Breath and/or Wheezing  ALPRAZolam 0.25 milliGRAM(s) Oral every 6 hours PRN Anxiety  aluminum hydroxide/magnesium hydroxide/simethicone Suspension 30 milliLiter(s) Oral every 4 hours PRN Dyspepsia  dextrose Oral Gel 15 Gram(s) Oral once PRN Blood Glucose LESS THAN 70 milliGRAM(s)/deciliter  loperamide 2 milliGRAM(s) Oral three times a day PRN Diarrhea  polyethylene glycol 3350 17 Gram(s) Oral daily PRN Constipation  SIMETHICONE SOFTGELS 250 milliGRAM(s) 250 milliGRAM(s) Oral three times a day PRN for gas  sodium chloride 0.65% Nasal 1 Spray(s) Both Nostrils every 8 hours PRN Nasal Congestion   CC: Non-specific Interstitial Lung Disease     Today's Subjective & Objective Findings:  Patient seen and examined, observed at bedside this AM with Dr. López.  Partner present.  Reports good night rest  BL elbow flexion/extension continues to improve  Weaning off 02 as being tolerated, was off oxy several hours during the day yesterday.   No other complaints.     ROS --No dyspnea, head ache, chest or abd pain LBM 10/31    Therapy-- engaging, motivated  Sustained improvement, Observed combing/brushing her hair with left hand, happy with this progress as its one of her therapy goals  Reports being off oxy for majority of the time during the days yesterday with normal Oxy sat  Sat on the chair most of the day  Extension of acute rehab treatment duration denied by insurance, but SW working on alternative coverage arrangement with patient/Appeal of this decision       Vital Signs Last 24 Hrs  T(C): 36.4 (31 Oct 2023 21:15), Max: 36.4 (31 Oct 2023 21:15)  T(F): 97.6 (31 Oct 2023 21:15), Max: 97.6 (31 Oct 2023 21:15)  HR: 88 (31 Oct 2023 21:15) (88 - 88)  BP: 107/70 (31 Oct 2023 21:15) (107/70 - 107/70)  RR: 16 (31 Oct 2023 21:15) (16 - 16)  SpO2: 99% (31 Oct 2023 21:15) (99% - 99%)      PHYSICAL EXAM:  Gen -  Comfortable,   In no acute distress IL NC oxy 1L  HEENT - , nostrils are moist, no bleeding, mild icterus  Neck - Supple, No limited ROM, O2 via NC  Pulm - good   Cardiovascular - RRR, S1S2  Chest - good chest expansion,   Abdomen - Soft, non tender, +BS,   Extremities - No Cyanosis, no clubbing, leg edema b/l feet,, non pitting,    no calf tenderness, LUE Med line    Neuro-     Cognitive - awake, alert, fully oriented, engaging, speech normal      Motor -                    LEFT    UE - 4/5                    RIGHT UE - 4/5                     LEFT    LE - 3+-/5, distally and 2/5 proximal,                    RIGHT LE - 3+-/5  limitted by leg edema,, which is reducing      Sensory - Intact        Reflexes - DTR 1+      Coordination - FTN  impaired  due to weakness   MSK: soreness and weakness  Psychiatric - Mood stable, Affect WNL  Skin: Left lower abdomen ruptured blister 3.0 x 1.0, stage 2 pressure injury to right buttock 4 x1 and left buttock 3x1, stage 2 pressure injury ti right inner thigh 0.2 x 0.2, right groin wound 10.5 x 2.0, Left groin wound 5 x 5,    Hand swelling reducing  LE edema is limited to dorsum    MMT--Able to contact B/L upper back muscles,   Elbow abduction and adduction, gravity eliminated 2+    LABS:                        9.5    7.62  )-----------( 268      ( 30 Oct 2023 06:30 )             30.5     10-30    138  |  103  |  18  ----------------------------<  101<H>  3.5   |  28  |  0.36<L>    Ca    9.2      30 Oct 2023 06:30    TPro  4.9<L>  /  Alb  2.2<L>  /  TBili  1.5<H>  /  DBili  x   /  AST  53<H>  /  ALT  80<H>  /  AlkPhos  276<H>  10-30      Urinalysis Basic - ( 30 Oct 2023 06:30 )    Color: x / Appearance: x / SG: x / pH: x  Gluc: 101 mg/dL / Ketone: x  / Bili: x / Urobili: x   Blood: x / Protein: x / Nitrite: x   Leuk Esterase: x / RBC: x / WBC x   Sq Epi: x / Non Sq Epi: x / Bacteria: x      MEDICATIONS  (STANDING):  ammonium lactate 12% Lotion 1 Application(s) Topical every 12 hours  apixaban 5 milliGRAM(s) Oral every 12 hours  artificial  tears Solution 1 Drop(s) Both EYES every 12 hours  ascorbic acid 500 milliGRAM(s) Oral daily  atovaquone  Suspension 1500 milliGRAM(s) Oral daily  bisacodyl 5 milliGRAM(s) Oral <User Schedule>  dextrose 5%. 1000 milliLiter(s) (50 mL/Hr) IV Continuous <Continuous>  dextrose 5%. 1000 milliLiter(s) (100 mL/Hr) IV Continuous <Continuous>  dextrose 50% Injectable 25 Gram(s) IV Push once  dextrose 50% Injectable 25 Gram(s) IV Push once  dextrose 50% Injectable 12.5 Gram(s) IV Push once  folic acid 1 milliGRAM(s) Oral daily  gabapentin 300 milliGRAM(s) Oral two times a day  glucagon  Injectable 1 milliGRAM(s) IntraMuscular once  hydrocortisone hemorrhoidal Suppository 1 Suppository(s) Rectal two times a day  influenza   Vaccine 0.5 milliLiter(s) IntraMuscular once  lactobacillus acidophilus 1 Tablet(s) Oral two times a day with meals  melatonin 6 milliGRAM(s) Oral at bedtime  methylPREDNISolone   Oral   methylPREDNISolone 56 milliGRAM(s) Oral daily  metoprolol tartrate 25 milliGRAM(s) Oral two times a day  multivitamin 1 Tablet(s) Oral daily  nystatin Powder 1 Application(s) Topical two times a day  pantoprazole    Tablet 40 milliGRAM(s) Oral before breakfast  psyllium Powder 1 Packet(s) Oral daily  sodium chloride 0.9% lock flush 5 milliLiter(s) IV Push two times a day  zinc oxide 40% Paste 1 Application(s) Topical three times a day    MEDICATIONS  (PRN):  albuterol/ipratropium for Nebulization 3 milliLiter(s) Nebulizer every 6 hours PRN Shortness of Breath and/or Wheezing  ALPRAZolam 0.25 milliGRAM(s) Oral every 6 hours PRN Anxiety  aluminum hydroxide/magnesium hydroxide/simethicone Suspension 30 milliLiter(s) Oral every 4 hours PRN Dyspepsia  dextrose Oral Gel 15 Gram(s) Oral once PRN Blood Glucose LESS THAN 70 milliGRAM(s)/deciliter  loperamide 2 milliGRAM(s) Oral three times a day PRN Diarrhea  polyethylene glycol 3350 17 Gram(s) Oral daily PRN Constipation  SIMETHICONE SOFTGELS 250 milliGRAM(s) 250 milliGRAM(s) Oral three times a day PRN for gas  sodium chloride 0.65% Nasal 1 Spray(s) Both Nostrils every 8 hours PRN Nasal Congestion

## 2023-11-01 NOTE — PROGRESS NOTE ADULT - NS ATTEND AMEND GEN_ALL_CORE FT
Seen and examined, note revised, findings as noted    No interval med events    Sustained and gradual improvement of UE motor function--able to use arms for some ADLs, happy with this  Oxy tapering progressing as planned    Details from IDT discussed    Clinically stable  Pulmonary team will continue f/u as previously discussed     Continue therapy   Steroid tapering   SW f/u for dc plan

## 2023-11-01 NOTE — PROGRESS NOTE ADULT - ASSESSMENT
Assessment/Plan:  ALIZA FOLEY is a 62 year old female with PMH of pAFIB and sciatica; who presented to [facility] ED with SOB, and was found to have groundglass opacities and interstitial lung disease. She was treated with empiric Vancomycin and Zosyn. She underwent a bronchoscopy with bronchoalveolar lavage and pulse steroids. She was given IV diuretics for increased pulmonary congestion, but her respiratory status continued to worsen.  On 9/13, she was transferred to [facility] for ECMO requirements. She was further treated with Plasmapheresis, Rituximab and high-dose steroids, with significant improvement. Her overall hospital course was complicated by thrombocytopenia (s/p several platelet transfusions), leukocytosis (infectious workup entirely negative- s/p Vanco and Ceftriaxone), AFIB with RVR (resolved with Metoprolol), dysphagia (requiring NGT), transaminitis (secondary to acute illness and hypotension),  non-occlusive RIJ DVT (started on Lovenox). Patient now admitted for a multidisciplinary rehab program. 10-05-23 @ 13:18    * Wound care following  * Pulmonary team following - continue steroid taper- Plan to wean off 02 as tolerated   * DC planning as already discussed, staring with appeal for extension of acute rehab stay duration (SW sending notes to insurance) and other options depending on rhe response  * Explore possibility of private pay for extra therapy during week ends as requested by patient  * Psych recs Xanax 0.25mg PRN  * Gabapentin to 300mg BID- Check B12 level       #Interstitial Lung Disease and Mixed connective tissue disease  - Gait Instability, ADL impairments and Functional impairments: start Comprehensive Rehab Program of PT/OT/SLP - 3 hours a day, 5 days a week  - P&O as needed   - Non-specific Interstitial Lung Disease  - Requiring ECMO   - Improvement s/p Plasmapheresis, Rituximab and high- dose steroids   - Albuterol/Ipratropium Nebulizer every 6 hours  - Mepron 1500mg daily  - PO Medrol ( due to liver dysfunction): Plan will be to taper by ~20% every 2 weeks  Medrol   64mg x 2 weeks   56mg x 2 weeks  44mg x 2 weeks   36mg x 2 weeks - Follow up Outpatient   -- Repeat CT Chest in 6 weeks   --Pulmonary team--f/u with patient and clarify when she can get immunizations    -Perform BL elbow flexion & elbow extension with antigravity in therapy  - Noted to have missed 5 days between steroid taper dosing- resumed at 56mg on 10/26- pulm recommends to continue as originally instructed   - Plan to wean 02 as tolerated     10/24--SW will apply to insurance for extension in few days and proceed with other options depending on response  F/U to earlier discussion on 10/18  Extension denied on 10/30  Case management performing peer to peer     #pAFIB/Rt Ij thrombus  - Metoprolol 25mg BID and lovenox  --- Eliquis 5mg BID - tolerating well     # Chronic Tinnitus   - ENT review and recs appreciate, Patient already made ENT appointment for f/u    # Lymphedema both legs -chronic and Rt IJ thrombus  - ACE Wrap BL LEs  - BL LE doppler negative for DVT  - BL UE doppler with chronic thrombotic changes in RIJ     #Transaminitis --LFT Down trending   - Secondary to acute illness and hypotension at [facility]  - Outpatient follow up     #Neuropathy  -- Start Gabapentin 300 mg BID  --Work on motor strengthening, priority for UE as preferred by patient   - Check B12 level     #Sleep/Mood  - Melatonin 6mg at HS  - Psych rec Xanax 0.25mg PRN    #Skin  - Skin: Left lower abdomen ruptured blister 3.0 x 1.0, stage 2 pressure injury to right buttock 4 x1 and left buttock 3x1, stage 2 pressure injury ti right inner thigh 0.2 x 0.2, right groin wound 10.5 x 2.0, Left groin wound 5 x 5,  right neck scab wound  -- MAD to skin folds- Nystatin to submammary and abdominal pannus BID  -- MAD to L groin- cleanse with NS, Triad cream daily  -- Mad to R groin- Nystatin powder daily   -- R groin wound-- aquacel packing, Triad to periwound, Allevyn foam- daily and PRN   - Pressure injury/Skin: OOB to Chair, PT/OT   - Offload pressure, low air loss support surfaces, T&P every 2 hours   --Wound care consult-10/9 -Multiple wounds--perineal and buttock/sacral, recs appreciated   --Suggest Continue treatments as below:   Twice daily & PRN Normal Saline Cleanse of abdominal pannus & breast folds, perineal, Left & Right inner buttock erosions.  Pat thoroughly dry.   Apply Desitin generously twice daily (& PRN) to perineal, buttocks, and left groin erosions, leave open to air.    Apply Nystatin powder to abdominal pannus and breast folds.  Suggest use of Interdry cloths in folds to 'wick' moisture.  Suggest daily Normal Saline Cleanse of right groin surgical wound, pat dry.  Apply Cavillon film barrier to intact periwound skin.  Place Aquacell strip over open area, cover with foam dressing.  - Wound care following       #Pain Mgmt   - Tylenol PRN   - Gabapentin 300mg at HS     #GI/Bowel Mgmt   - Pantoprazole  - Senna  --on hold due to recent Loose BM  - PRN: Maalox   - Stool count  -  Lactobacillus BID   -- AB XR mod stool burden on transverse colon 10/23--still has mod stool burden, but moving bowel appropriately  - S/p enema and lactulose    # Alternating bowel function --commenced probiotic, latobacilus   * Leucocytosis probably steroid related and partly due to her Mixed Connective Tissue Disease, will continue monitoring     #/Bladder Mgmt --voiding   # Anxiety disorder--Psych consult 10/30--commenced xanax prn     #FEN   #Dysphagia  - Diet - Minced & Moist  [CC]    - Dysphaiga- SLP evaluation     #Health maintenance  - Folic Acid   - MVI  - Ocean nasal spray    # Difficulty with access to peripheral veins-- Blood draws from Left arm Medline     #Precautions / PROPHYLAXIS:   - Falls  - ortho: Weight bearing status: WBAT   - Lungs: Aspiration, Incentive Spirometer   - DVT PPX: Eliquis 5 mg BID   ---------------------------  *l abs 10/230--stable anemia, normal renal function and down trending LFT      Liaison with family  Patient's partner present during review, details of review d/w her, detailed explanation of therapy protocol explained and she was happy with same  10/9--Partner present during review and discussed details treatment plan with her    Liaison with providers  Consult recs form multiple specialities being implemented  Surgical consult and recs 10/9--agreed with plan for AC for Rt IJ chronic thrombus    10/10 --No response to multiple calls to patient'srivate cardiologist Dr Otto Stratton  ph     ---------------------------  IDT conference on 10/31  Social Work: Await peer to peer with CM. Provided private hire list. Lives with spouse in [place] 6 months of year. DC to apt in [place] with elevator, no steps. Supportive spouse.   SLP: --  OT: Mod-max A for eating/grooming with arm support. Total A for all else. Sitting balance improving.  PT: Total A. Damaris.   RT: Declining.   DME: TBD   Barriers: 02 requirements,   Functional level on DC: Total A, WC level.  TDD: 11/7 to EMIGDIO.  ---------------------------  OUTPATIENT/FOLLOW UP:    Trae Whitney  Pulmonary Disease  [address], [address] [place] [address]  Phone: [phone]  Fax: [phone]  Follow Up Time: 2 weeks    Ryanne Allen  Rheumatology  [address] [address]  Phone: [phone]  Fax: [phone]  Follow Up Time: 1 week    Kyle Pierce  Internal Medicine  [address] [place] [address], [address]  [place] [address]  Phone: [phone]  Fax: [phone]  Follow Up Time: 2 weeks    Addy Powers  Pulmonary Disease  410 [facility], [address] [id number]  Phone: [phone]  Fax: [phone]  Follow Up Time:     Kwame Hawley  Critical Care Medicine  410 [facility], [address]  [place] [address]  Phone: [phone]  Fax: [phone]  Follow Up Time:     Neena Borrego  Rheumatology  [address] [place] [address], [address]  [place] [address]  Phone: [phone]  Fax: [phone]  Follow Up Time:    [facility]  [address]  Scheduled Appointment: 09/13/2023  2:40 PM  ---------------------------   Assessment/Plan:  ALIZA FOLEY is a 62 year old female with PMH of pAFIB and sciatica; who presented to [facility] ED with SOB, and was found to have groundglass opacities and interstitial lung disease. She was treated with empiric Vancomycin and Zosyn. She underwent a bronchoscopy with bronchoalveolar lavage and pulse steroids. She was given IV diuretics for increased pulmonary congestion, but her respiratory status continued to worsen.  On 9/13, she was transferred to [facility] for ECMO requirements. She was further treated with Plasmapheresis, Rituximab and high-dose steroids, with significant improvement. Her overall hospital course was complicated by thrombocytopenia (s/p several platelet transfusions), leukocytosis (infectious workup entirely negative- s/p Vanco and Ceftriaxone), AFIB with RVR (resolved with Metoprolol), dysphagia (requiring NGT), transaminitis (secondary to acute illness and hypotension),  non-occlusive RIJ DVT (started on Lovenox). Patient now admitted for a multidisciplinary rehab program. 10-05-23 @ 13:18    * Wound care following  * Pulmonary team following - continue steroid taper- Plan to wean off 02 as tolerated   * DC planning as already discussed, staring with appeal for extension of acute rehab stay duration (SW sending notes to insurance) and other options depending on the response  * Explore possibility of private pay for extra therapy during week ends as requested by patient      #Interstitial Lung Disease and Mixed connective tissue disease  - Gait Instability, ADL impairments and Functional impairments: start Comprehensive Rehab Program of PT/OT/SLP - 3 hours a day, 5 days a week  - P&O as needed   - Non-specific Interstitial Lung Disease  - Requiring ECMO   - Improvement s/p Plasmapheresis, Rituximab and high- dose steroids   - Albuterol/Ipratropium Nebulizer every 6 hours  - Mepron 1500mg daily  - PO Medrol ( due to liver dysfunction): Plan will be to taper by ~20% every 2 weeks  Medrol   64mg x 2 weeks   56mg x 2 weeks  44mg x 2 weeks   36mg x 2 weeks - Follow up Outpatient   -- Repeat CT Chest in 6 weeks   --Pulmonary team--f/u with patient and clarify when she can get immunizations    -Perform BL elbow flexion & elbow extension with antigravity in therapy  - Noted to have missed 5 days between steroid taper dosing- resumed at 56mg on 10/26- pulm recommends to continue as originally instructed   - Plan to wean 02 as tolerated     10/24--SW will apply to insurance for extension in few days and proceed with other options depending on response  F/U to earlier discussion on 10/18  Extension denied on 10/30  Case management performing peer to peer     #pAFIB/Rt Ij thrombus  - Metoprolol 25mg BID and lovenox  --- Eliquis 5mg BID - tolerating well     # Chronic Tinnitus   - ENT review and recs appreciate, Patient already made ENT appointment for f/u    # Lymphedema both legs -chronic and Rt IJ thrombus  - ACE Wrap BL LEs  - BL LE doppler negative for DVT  - BL UE doppler with chronic thrombotic changes in RIJ     #Transaminitis --LFT Down trending   - Secondary to acute illness and hypotension at [facility]  - Outpatient follow up     #Neuropathy  - Gabapentin 300 mg BID  --Work on motor strengthening, priority for UE as preferred by patient   - Check B12 level     #Sleep/Mood  - Melatonin 6mg at HS  - Psych rec Xanax 0.25mg PRN    #Skin  - Skin: Left lower abdomen ruptured blister 3.0 x 1.0, stage 2 pressure injury to right buttock 4 x1 and left buttock 3x1, stage 2 pressure injury ti right inner thigh 0.2 x 0.2, right groin wound 10.5 x 2.0, Left groin wound 5 x 5,  right neck scab wound  -- MAD to skin folds- Nystatin to submammary and abdominal pannus BID  -- MAD to L groin- cleanse with NS, Triad cream daily  -- Mad to R groin- Nystatin powder daily   -- R groin wound-- aquacel packing, Triad to periwound, Allevyn foam- daily and PRN   - Pressure injury/Skin: OOB to Chair, PT/OT   - Offload pressure, low air loss support surfaces, T&P every 2 hours   --Wound care consult-10/9 -Multiple wounds--perineal and buttock/sacral, recs appreciated   --Suggest Continue treatments as below:   Twice daily & PRN Normal Saline Cleanse of abdominal pannus & breast folds, perineal, Left & Right inner buttock erosions.  Pat thoroughly dry.   Apply Desitin generously twice daily (& PRN) to perineal, buttocks, and left groin erosions, leave open to air.    Apply Nystatin powder to abdominal pannus and breast folds.  Suggest use of Interdry cloths in folds to 'wick' moisture.  Suggest daily Normal Saline Cleanse of right groin surgical wound, pat dry.  Apply Cavillon film barrier to intact periwound skin.  Place Aquacell strip over open area, cover with foam dressing.  - Wound care following       #Pain Mgmt   - Tylenol PRN   - Gabapentin 300mg at HS     #GI/Bowel Mgmt   - Pantoprazole  - Senna  --on hold due to recent Loose BM  - PRN: Maalox   - Stool count  -  Lactobacillus BID   -- AB XR mod stool burden on transverse colon 10/23--still has mod stool burden, but moving bowel appropriately  - S/p enema and lactulose    # Alternating bowel function --commenced probiotic, latobacilus   * Leucocytosis probably steroid related and partly due to her Mixed Connective Tissue Disease, will continue monitoring     #/Bladder Mgmt --voiding     #FEN   #Dysphagia  - Diet - Minced & Moist  [CC]    - Dysphaiga- SLP evaluation     #Health maintenance  - Folic Acid   - MVI  - Ocean nasal spray    # Difficulty with access to peripheral veins-- Blood draws from Left arm Medline     #Precautions / PROPHYLAXIS:   - Falls  - ortho: Weight bearing status: WBAT   - Lungs: Aspiration, Incentive Spirometer   - DVT PPX: Eliquis 5 mg BID   ---------------------------  *l abs 10/230--stable anemia, normal renal function and down trending LFT      Liaison with family  Patient's partner present during review, details of review d/w her, detailed explanation of therapy protocol explained and she was happy with same  10/9--Partner present during review and discussed details treatment plan with her    Liaison with providers  Consult recs form multiple specialities being implemented  Surgical consult and recs 10/9--agreed with plan for AC for Rt IJ chronic thrombus    10/10 --No response to multiple calls to [facility] cardiologist Dr Otto Stratton  ph     ---------------------------  IDT conference on 10/31  Social Work: Await peer to peer with CM. Provided private hire list. Lives with spouse in [place] 6 months of year. DC to apt in [place] with elevator, no steps. Supportive spouse.   SLP: --  OT: Mod-max A for eating/grooming with arm support. Total A for all else. Sitting balance improving.  PT: Nereyda Ocampo.   RT: Declining.   DME: TBD   Barriers: 02 requirements,   Functional level on DC: Total A, WC level.  TDD: 11/7 to EMIGDIO.  ---------------------------  OUTPATIENT/FOLLOW UP:    Trae Whitney  Pulmonary Disease  [address], [address] [place] [address]  Phone: [phone]  Fax: [phone]  Follow Up Time: 2 weeks    Ryanne Allen  Rheumatology  [address] [address]  Phone: [phone]  Fax: [phone]  Follow Up Time: 1 week    Kyle Pierce [place]  Internal Medicine  [address] [place] [address], [address]  [place] [address]  Phone: [phone]  Fax: [phone]  Follow Up Time: 2 weeks    Addy Powers  Pulmonary Disease  410 [facility], [facility] [address]  [place], [place] [address]  Phone: [phone]  Fax: [phone]  Follow Up Time:     Kwame Hawley  Critical Care Medicine  410 [facility], [address]  [place] [address]  Phone: [phone]  Fax: [phone]  Follow Up Time:     Neena Borrego  Rheumatology  [address] [place] [address], [address]  [place] [address]  Phone: [phone]  Fax: [phone]  Follow Up Time:    [facility]  [facility] [address]  Scheduled Appointment: 09/13/2023  2:40 PM  ---------------------------   Assessment/Plan:  ALIZA FOLEY is a 62 year old female with PMH of pAFIB and sciatica; who presented to [facility] ED with SOB, and was found to have groundglass opacities and interstitial lung disease. She was treated with empiric Vancomycin and Zosyn. She underwent a bronchoscopy with bronchoalveolar lavage and pulse steroids. She was given IV diuretics for increased pulmonary congestion, but her respiratory status continued to worsen.  On 9/13, she was transferred to [facility] for ECMO requirements. She was further treated with Plasmapheresis, Rituximab and high-dose steroids, with significant improvement. Her overall hospital course was complicated by thrombocytopenia (s/p several platelet transfusions), leukocytosis (infectious workup entirely negative- s/p Vanco and Ceftriaxone), AFIB with RVR (resolved with Metoprolol), dysphagia (requiring NGT), transaminitis (secondary to acute illness and hypotension),  non-occlusive RIJ DVT (started on Lovenox). Patient now admitted for a multidisciplinary rehab program. 10-05-23 @ 13:18    * Wound care following  * Therapy plan--continue tapering oxy, Muscle strengthening, ADLs   * Pulmonary team following - continue steroid taper- Plan to wean off 02 as tolerated   * DC planning as already discussed, staring with appeal for extension of acute rehab stay duration (SW sending notes to insurance) and other options depending on the response  * Explore possibility of private pay for extra therapy during week ends as requested by patient      #Interstitial Lung Disease and Mixed connective tissue disease  - Gait Instability, ADL impairments and Functional impairments: start Comprehensive Rehab Program of PT/OT/SLP - 3 hours a day, 5 days a week  - P&O as needed   - Non-specific Interstitial Lung Disease  - Requiring ECMO   - Improvement s/p Plasmapheresis, Rituximab and high- dose steroids   - Albuterol/Ipratropium Nebulizer every 6 hours  - Mepron 1500mg daily  - PO Medrol ( due to liver dysfunction): Plan will be to taper by ~20% every 2 weeks  Medrol   64mg x 2 weeks   56mg x 2 weeks  44mg x 2 weeks   36mg x 2 weeks - Follow up Outpatient   -- Repeat CT Chest in 6 weeks   --Pulmonary team--f/u with patient and clarify when she can get immunizations    -Perform BL elbow flexion & elbow extension with antigravity in therapy  - Noted to have missed 5 days between steroid taper dosing- resumed at 56mg on 10/26- pulm recommends to continue as originally instructed   - Plan to wean 02 as tolerated     10/24--SW will apply to insurance for extension in few days and proceed with other options depending on response  F/U to earlier discussion on 10/18  Extension denied on 10/30  Case management performing peer to peer     #pAFIB/Rt Ij thrombus  - Metoprolol 25mg BID and lovenox  --- Eliquis 5mg BID - tolerating well     # Chronic Tinnitus   - ENT review and recs appreciate, Patient already made ENT appointment for f/u    # Lymphedema both legs -chronic and Rt IJ thrombus  - ACE Wrap BL LEs  - BL LE doppler negative for DVT  - BL UE doppler with chronic thrombotic changes in RIJ     #Transaminitis --LFT Down trending   - Secondary to acute illness and hypotension at [facility]  - Outpatient follow up     #Neuropathy  - Gabapentin 300 mg BID  --Work on motor strengthening, priority for UE as preferred by patient   - Check B12 level     #Sleep/Mood  - Melatonin 6mg at HS  - Psych rec Xanax 0.25mg PRN    #Skin  - Skin: Left lower abdomen ruptured blister 3.0 x 1.0, stage 2 pressure injury to right buttock 4 x1 and left buttock 3x1, stage 2 pressure injury ti right inner thigh 0.2 x 0.2, right groin wound 10.5 x 2.0, Left groin wound 5 x 5,  right neck scab wound  -- MAD to skin folds- Nystatin to submammary and abdominal pannus BID  -- MAD to L groin- cleanse with NS, Triad cream daily  -- Mad to R groin- Nystatin powder daily   -- R groin wound-- aquacel packing, Triad to periwound, Allevyn foam- daily and PRN   - Pressure injury/Skin: OOB to Chair, PT/OT   - Offload pressure, low air loss support surfaces, T&P every 2 hours   --Wound care consult-10/9 -Multiple wounds--perineal and buttock/sacral, recs appreciated   --Suggest Continue treatments as below:   Twice daily & PRN Normal Saline Cleanse of abdominal pannus & breast folds, perineal, Left & Right inner buttock erosions.  Pat thoroughly dry.   Apply Desitin generously twice daily (& PRN) to perineal, buttocks, and left groin erosions, leave open to air.    Apply Nystatin powder to abdominal pannus and breast folds.  Suggest use of Interdry cloths in folds to 'wick' moisture.  Suggest daily Normal Saline Cleanse of right groin surgical wound, pat dry.  Apply Cavillon film barrier to intact periwound skin.  Place Aquacell strip over open area, cover with foam dressing.  - Wound care following       #Pain Mgmt   - Tylenol PRN   - Gabapentin 300mg at HS     #GI/Bowel Mgmt   - Pantoprazole  - Senna  --on hold due to recent Loose BM  - PRN: Maalox   - Stool count  -  Lactobacillus BID   -- AB XR mod stool burden on transverse colon 10/23--still has mod stool burden, but moving bowel appropriately  - S/p enema and lactulose    # Alternating bowel function --commenced probiotic, latobacilus   * Leucocytosis probably steroid related and partly due to her Mixed Connective Tissue Disease, will continue monitoring     #/Bladder Mgmt --voiding     #FEN   #Dysphagia  - Diet - Minced & Moist  [CC]    - Dysphaiga- SLP evaluation     #Health maintenance  - Folic Acid   - MVI  - Ocean nasal spray    # Difficulty with access to peripheral veins-- Blood draws from Left arm Medline     #Precautions / PROPHYLAXIS:   - Falls  - ortho: Weight bearing status: WBAT   - Lungs: Aspiration, Incentive Spirometer   - DVT PPX: Eliquis 5 mg BID   ---------------------------  *l abs 10/230--stable anemia, normal renal function and down trending LFT      Liaison with family  Patient's partner present during review, details of review d/w her, detailed explanation of therapy protocol explained and she was happy with same  10/9--Partner present during review and discussed details treatment plan with her    Liaison with providers  Consult recs form multiple specialities being implemented  Surgical consult and recs 10/9--agreed with plan for AC for Rt IJ chronic thrombus    10/10 --No response to multiple calls to patient'srivate cardiologist Dr Otto Stratton  ph     ---------------------------  IDT conference on 10/31  Social Work: Await peer to peer with CM. Provided private hire list. Lives with spouse in [place] 6 months of year. DC to apt in [place] with elevator, no steps. Supportive spouse.   SLP: --  OT: Mod-max A for eating/grooming with arm support. Total A for all else. Sitting balance improving.  PT: Nereyda Ocampo.   RT: Declining.   DME: TBD   Barriers: 02 requirements,   Functional level on DC: Total A, WC level.  TDD: 11/7 to EMIGDIO.  ---------------------------  OUTPATIENT/FOLLOW UP:    Trae Whitney  Pulmonary Disease  [address], [address] [place] [address]  Phone: [phone]  Fax: [phone]  Follow Up Time: 2 weeks    Ryanne Allen  Rheumatology  [address] [address]  Phone: [phone]  Fax: [phone]  Follow Up Time: 1 week    Kyle Pierce  Internal Medicine  [address] [place] [address], [address]  [place] [address]  Phone: [phone]  Fax: [phone]  Follow Up Time: 2 weeks    Addy Powers  Pulmonary Disease  410 [facility], [facility] [address]  [place], [place] [address]  Phone: [phone]  Fax: [phone]  Follow Up Time:     Kwame Hawley  Critical Care Medicine  410 [facility], [facility] [address]  [place] [address]  Phone: [phone]  Fax: [phone]  Follow Up Time:     Neena Borrego  Rheumatology  [address] [facility], Floor [address]  [place] [address]  Phone: [phone]  Fax: [phone]  Follow Up Time:    [facility]  [address]  Scheduled Appointment: 09/13/2023  2:40 PM  ---------------------------

## 2023-11-02 LAB
ALBUMIN SERPL ELPH-MCNC: 2.4 G/DL — LOW (ref 3.3–5)
ALBUMIN SERPL ELPH-MCNC: 2.4 G/DL — LOW (ref 3.3–5)
ALP SERPL-CCNC: 338 U/L — HIGH (ref 40–120)
ALP SERPL-CCNC: 338 U/L — HIGH (ref 40–120)
ALT FLD-CCNC: 114 U/L — HIGH (ref 10–45)
ALT FLD-CCNC: 114 U/L — HIGH (ref 10–45)
ANION GAP SERPL CALC-SCNC: 7 MMOL/L — SIGNIFICANT CHANGE UP (ref 5–17)
ANION GAP SERPL CALC-SCNC: 7 MMOL/L — SIGNIFICANT CHANGE UP (ref 5–17)
AST SERPL-CCNC: 68 U/L — HIGH (ref 10–40)
AST SERPL-CCNC: 68 U/L — HIGH (ref 10–40)
BASOPHILS # BLD AUTO: 0.02 K/UL — SIGNIFICANT CHANGE UP (ref 0–0.2)
BASOPHILS # BLD AUTO: 0.02 K/UL — SIGNIFICANT CHANGE UP (ref 0–0.2)
BASOPHILS NFR BLD AUTO: 0.2 % — SIGNIFICANT CHANGE UP (ref 0–2)
BASOPHILS NFR BLD AUTO: 0.2 % — SIGNIFICANT CHANGE UP (ref 0–2)
BILIRUB SERPL-MCNC: 1.3 MG/DL — HIGH (ref 0.2–1.2)
BILIRUB SERPL-MCNC: 1.3 MG/DL — HIGH (ref 0.2–1.2)
BUN SERPL-MCNC: 22 MG/DL — SIGNIFICANT CHANGE UP (ref 7–23)
BUN SERPL-MCNC: 22 MG/DL — SIGNIFICANT CHANGE UP (ref 7–23)
CALCIUM SERPL-MCNC: 9 MG/DL — SIGNIFICANT CHANGE UP (ref 8.4–10.5)
CALCIUM SERPL-MCNC: 9 MG/DL — SIGNIFICANT CHANGE UP (ref 8.4–10.5)
CHLORIDE SERPL-SCNC: 101 MMOL/L — SIGNIFICANT CHANGE UP (ref 96–108)
CHLORIDE SERPL-SCNC: 101 MMOL/L — SIGNIFICANT CHANGE UP (ref 96–108)
CO2 SERPL-SCNC: 28 MMOL/L — SIGNIFICANT CHANGE UP (ref 22–31)
CO2 SERPL-SCNC: 28 MMOL/L — SIGNIFICANT CHANGE UP (ref 22–31)
CREAT SERPL-MCNC: 0.7 MG/DL — SIGNIFICANT CHANGE UP (ref 0.5–1.3)
CREAT SERPL-MCNC: 0.7 MG/DL — SIGNIFICANT CHANGE UP (ref 0.5–1.3)
EGFR: 97 ML/MIN/1.73M2 — SIGNIFICANT CHANGE UP
EGFR: 97 ML/MIN/1.73M2 — SIGNIFICANT CHANGE UP
EOSINOPHIL # BLD AUTO: 0 K/UL — SIGNIFICANT CHANGE UP (ref 0–0.5)
EOSINOPHIL # BLD AUTO: 0 K/UL — SIGNIFICANT CHANGE UP (ref 0–0.5)
EOSINOPHIL NFR BLD AUTO: 0 % — SIGNIFICANT CHANGE UP (ref 0–6)
EOSINOPHIL NFR BLD AUTO: 0 % — SIGNIFICANT CHANGE UP (ref 0–6)
GLUCOSE SERPL-MCNC: 205 MG/DL — HIGH (ref 70–99)
GLUCOSE SERPL-MCNC: 205 MG/DL — HIGH (ref 70–99)
HCT VFR BLD CALC: 32.5 % — LOW (ref 34.5–45)
HCT VFR BLD CALC: 32.5 % — LOW (ref 34.5–45)
HGB BLD-MCNC: 10 G/DL — LOW (ref 11.5–15.5)
HGB BLD-MCNC: 10 G/DL — LOW (ref 11.5–15.5)
IMM GRANULOCYTES NFR BLD AUTO: 1.1 % — HIGH (ref 0–0.9)
IMM GRANULOCYTES NFR BLD AUTO: 1.1 % — HIGH (ref 0–0.9)
LYMPHOCYTES # BLD AUTO: 0.14 K/UL — LOW (ref 1–3.3)
LYMPHOCYTES # BLD AUTO: 0.14 K/UL — LOW (ref 1–3.3)
LYMPHOCYTES # BLD AUTO: 1.2 % — LOW (ref 13–44)
LYMPHOCYTES # BLD AUTO: 1.2 % — LOW (ref 13–44)
MCHC RBC-ENTMCNC: 30.4 PG — SIGNIFICANT CHANGE UP (ref 27–34)
MCHC RBC-ENTMCNC: 30.4 PG — SIGNIFICANT CHANGE UP (ref 27–34)
MCHC RBC-ENTMCNC: 30.8 GM/DL — LOW (ref 32–36)
MCHC RBC-ENTMCNC: 30.8 GM/DL — LOW (ref 32–36)
MCV RBC AUTO: 98.8 FL — SIGNIFICANT CHANGE UP (ref 80–100)
MCV RBC AUTO: 98.8 FL — SIGNIFICANT CHANGE UP (ref 80–100)
MONOCYTES # BLD AUTO: 0.19 K/UL — SIGNIFICANT CHANGE UP (ref 0–0.9)
MONOCYTES # BLD AUTO: 0.19 K/UL — SIGNIFICANT CHANGE UP (ref 0–0.9)
MONOCYTES NFR BLD AUTO: 1.6 % — LOW (ref 2–14)
MONOCYTES NFR BLD AUTO: 1.6 % — LOW (ref 2–14)
NEUTROPHILS # BLD AUTO: 11.67 K/UL — HIGH (ref 1.8–7.4)
NEUTROPHILS # BLD AUTO: 11.67 K/UL — HIGH (ref 1.8–7.4)
NEUTROPHILS NFR BLD AUTO: 95.9 % — HIGH (ref 43–77)
NEUTROPHILS NFR BLD AUTO: 95.9 % — HIGH (ref 43–77)
NRBC # BLD: 0 /100 WBCS — SIGNIFICANT CHANGE UP (ref 0–0)
NRBC # BLD: 0 /100 WBCS — SIGNIFICANT CHANGE UP (ref 0–0)
PLATELET # BLD AUTO: 344 K/UL — SIGNIFICANT CHANGE UP (ref 150–400)
PLATELET # BLD AUTO: 344 K/UL — SIGNIFICANT CHANGE UP (ref 150–400)
POTASSIUM SERPL-MCNC: 3.9 MMOL/L — SIGNIFICANT CHANGE UP (ref 3.5–5.3)
POTASSIUM SERPL-MCNC: 3.9 MMOL/L — SIGNIFICANT CHANGE UP (ref 3.5–5.3)
POTASSIUM SERPL-SCNC: 3.9 MMOL/L — SIGNIFICANT CHANGE UP (ref 3.5–5.3)
POTASSIUM SERPL-SCNC: 3.9 MMOL/L — SIGNIFICANT CHANGE UP (ref 3.5–5.3)
PROT SERPL-MCNC: 5.1 G/DL — LOW (ref 6–8.3)
PROT SERPL-MCNC: 5.1 G/DL — LOW (ref 6–8.3)
RBC # BLD: 3.29 M/UL — LOW (ref 3.8–5.2)
RBC # BLD: 3.29 M/UL — LOW (ref 3.8–5.2)
RBC # FLD: 15.5 % — HIGH (ref 10.3–14.5)
RBC # FLD: 15.5 % — HIGH (ref 10.3–14.5)
SODIUM SERPL-SCNC: 136 MMOL/L — SIGNIFICANT CHANGE UP (ref 135–145)
SODIUM SERPL-SCNC: 136 MMOL/L — SIGNIFICANT CHANGE UP (ref 135–145)
WBC # BLD: 12.15 K/UL — HIGH (ref 3.8–10.5)
WBC # BLD: 12.15 K/UL — HIGH (ref 3.8–10.5)
WBC # FLD AUTO: 12.15 K/UL — HIGH (ref 3.8–10.5)
WBC # FLD AUTO: 12.15 K/UL — HIGH (ref 3.8–10.5)

## 2023-11-02 PROCEDURE — 99232 SBSQ HOSP IP/OBS MODERATE 35: CPT

## 2023-11-02 RX ADMIN — PANTOPRAZOLE SODIUM 40 MILLIGRAM(S): 20 TABLET, DELAYED RELEASE ORAL at 09:08

## 2023-11-02 RX ADMIN — Medication 1 TABLET(S): at 12:35

## 2023-11-02 RX ADMIN — Medication 1 MILLIGRAM(S): at 12:35

## 2023-11-02 RX ADMIN — ATOVAQUONE 1500 MILLIGRAM(S): 750 SUSPENSION ORAL at 15:31

## 2023-11-02 RX ADMIN — SODIUM CHLORIDE 5 MILLILITER(S): 9 INJECTION INTRAMUSCULAR; INTRAVENOUS; SUBCUTANEOUS at 17:00

## 2023-11-02 RX ADMIN — GABAPENTIN 300 MILLIGRAM(S): 400 CAPSULE ORAL at 17:03

## 2023-11-02 RX ADMIN — SODIUM CHLORIDE 5 MILLILITER(S): 9 INJECTION INTRAMUSCULAR; INTRAVENOUS; SUBCUTANEOUS at 08:49

## 2023-11-02 RX ADMIN — Medication 5 MILLIGRAM(S): at 08:37

## 2023-11-02 RX ADMIN — Medication 25 MILLIGRAM(S): at 20:38

## 2023-11-02 RX ADMIN — Medication 6 MILLIGRAM(S): at 20:38

## 2023-11-02 RX ADMIN — ZINC OXIDE 1 APPLICATION(S): 200 OINTMENT TOPICAL at 08:37

## 2023-11-02 RX ADMIN — Medication 1 DROP(S): at 08:36

## 2023-11-02 RX ADMIN — Medication 1 APPLICATION(S): at 08:37

## 2023-11-02 RX ADMIN — Medication 1 APPLICATION(S): at 17:01

## 2023-11-02 RX ADMIN — GABAPENTIN 300 MILLIGRAM(S): 400 CAPSULE ORAL at 08:36

## 2023-11-02 RX ADMIN — APIXABAN 5 MILLIGRAM(S): 2.5 TABLET, FILM COATED ORAL at 08:37

## 2023-11-02 RX ADMIN — ZINC OXIDE 1 APPLICATION(S): 200 OINTMENT TOPICAL at 17:01

## 2023-11-02 RX ADMIN — NYSTATIN CREAM 1 APPLICATION(S): 100000 CREAM TOPICAL at 08:38

## 2023-11-02 RX ADMIN — Medication 56 MILLIGRAM(S): at 08:37

## 2023-11-02 RX ADMIN — ZINC OXIDE 1 APPLICATION(S): 200 OINTMENT TOPICAL at 20:40

## 2023-11-02 RX ADMIN — Medication 1 TABLET(S): at 17:00

## 2023-11-02 RX ADMIN — Medication 1 TABLET(S): at 08:37

## 2023-11-02 RX ADMIN — Medication 25 MILLIGRAM(S): at 08:37

## 2023-11-02 RX ADMIN — APIXABAN 5 MILLIGRAM(S): 2.5 TABLET, FILM COATED ORAL at 20:37

## 2023-11-02 RX ADMIN — NYSTATIN CREAM 1 APPLICATION(S): 100000 CREAM TOPICAL at 17:01

## 2023-11-02 RX ADMIN — Medication 1 DROP(S): at 20:38

## 2023-11-02 RX ADMIN — Medication 500 MILLIGRAM(S): at 12:35

## 2023-11-02 NOTE — PROGRESS NOTE ADULT - ASSESSMENT
Assessment/Plan:  ALIZA FOLEY is a 62 year old female with PMH of pAFIB and sciatica; who presented to [facility] ED with SOB, and was found to have groundglass opacities and interstitial lung disease. She was treated with empiric Vancomycin and Zosyn. She underwent a bronchoscopy with bronchoalveolar lavage and pulse steroids. She was given IV diuretics for increased pulmonary congestion, but her respiratory status continued to worsen.  On 9/13, she was transferred to [facility] for ECMO requirements. She was further treated with Plasmapheresis, Rituximab and high-dose steroids, with significant improvement. Her overall hospital course was complicated by thrombocytopenia (s/p several platelet transfusions), leukocytosis (infectious workup entirely negative- s/p Vanco and Ceftriaxone), AFIB with RVR (resolved with Metoprolol), dysphagia (requiring NGT), transaminitis (secondary to acute illness and hypotension),  non-occlusive RIJ DVT (started on Lovenox). Patient now admitted for a multidisciplinary rehab program. 10-05-23 @ 13:18    * Wound care following  * Therapy plan--continue tapering oxy, Muscle strengthening, ADLs   * Pulmonary team following - continue steroid taper- Plan to wean off 02 as tolerated   * Plan for CT Chest on 11/11, per pulm   * DC planning as already discussed, staring with appeal for extension of acute rehab stay duration (SW sending notes to insurance) and other options depending on the response  * Explore possibility of private pay for extra therapy during week ends as requested by patient      #Interstitial Lung Disease and Mixed connective tissue disease  - Gait Instability, ADL impairments and Functional impairments: start Comprehensive Rehab Program of PT/OT/SLP - 3 hours a day, 5 days a week  - P&O as needed   - Non-specific Interstitial Lung Disease  - Requiring ECMO   - Improvement s/p Plasmapheresis, Rituximab and high- dose steroids   - Albuterol/Ipratropium Nebulizer every 6 hours  - Mepron 1500mg daily  - PO Medrol ( due to liver dysfunction): Plan will be to taper by ~20% every 2 weeks  Medrol   64mg x 2 weeks   56mg x 2 weeks  44mg x 2 weeks   36mg x 2 weeks - Follow up Outpatient   -- Repeat CT Chest in 6 weeks   --Pulmonary team--f/u with patient and clarify when she can get immunizations    -Perform BL elbow flexion & elbow extension with antigravity in therapy  - Noted to have missed 5 days between steroid taper dosing- resumed at 56mg on 10/26- pulm recommends to continue as originally instructed   - Plan to wean 02 as tolerated   - Repeat CT Chest on 11/11     10/24--SW will apply to insurance for extension in few days and proceed with other options depending on response  F/U to earlier discussion on 10/18  Extension denied on 10/30  Case management performing peer to peer     #pAFIB/Rt Ij thrombus  - Metoprolol 25mg BID and lovenox  --- Eliquis 5mg BID - tolerating well     # Chronic Tinnitus   - ENT review and recs appreciate, Patient already made ENT appointment for f/u    # Lymphedema both legs -chronic and Rt IJ thrombus  - ACE Wrap BL LEs  - BL LE doppler negative for DVT  - BL UE doppler with chronic thrombotic changes in RIJ     #Transaminitis --LFT Down trending   - Secondary to acute illness and hypotension at [facility]  - Outpatient follow up     #Neuropathy  - Gabapentin 300 mg BID  --Work on motor strengthening, priority for UE as preferred by patient   - Check B12 level     #Sleep/Mood  - Melatonin 6mg at HS  - Psych rec Xanax 0.25mg PRN    #Skin  - Skin: Left lower abdomen ruptured blister 3.0 x 1.0, stage 2 pressure injury to right buttock 4 x1 and left buttock 3x1, stage 2 pressure injury ti right inner thigh 0.2 x 0.2, right groin wound 10.5 x 2.0, Left groin wound 5 x 5,  right neck scab wound  -- MAD to skin folds- Nystatin to submammary and abdominal pannus BID  -- MAD to L groin- cleanse with NS, Triad cream daily  -- Mad to R groin- Nystatin powder daily   -- R groin wound-- aquacel packing, Triad to periwound, Allevyn foam- daily and PRN   - Pressure injury/Skin: OOB to Chair, PT/OT   - Offload pressure, low air loss support surfaces, T&P every 2 hours   --Wound care consult-10/9 -Multiple wounds--perineal and buttock/sacral, recs appreciated   --Suggest Continue treatments as below:   Twice daily & PRN Normal Saline Cleanse of abdominal pannus & breast folds, perineal, Left & Right inner buttock erosions.  Pat thoroughly dry.   Apply Desitin generously twice daily (& PRN) to perineal, buttocks, and left groin erosions, leave open to air.    Apply Nystatin powder to abdominal pannus and breast folds.  Suggest use of Interdry cloths in folds to 'wick' moisture.  Suggest daily Normal Saline Cleanse of right groin surgical wound, pat dry.  Apply Cavillon film barrier to intact periwound skin.  Place Aquacell strip over open area, cover with foam dressing.  - Wound care following       #Pain Mgmt   - Tylenol PRN   - Gabapentin 300mg at HS     #GI/Bowel Mgmt   - Pantoprazole  - Senna  --on hold due to recent Loose BM  - PRN: Maalox   - Stool count  -  Lactobacillus BID   -- AB XR mod stool burden on transverse colon 10/23--still has mod stool burden, but moving bowel appropriately  - S/p enema and lactulose    # Alternating bowel function --commenced probiotic, latobacilus   * Leucocytosis probably steroid related and partly due to her Mixed Connective Tissue Disease, will continue monitoring     #/Bladder Mgmt --voiding     #FEN   #Dysphagia  - Diet - Minced & Moist  [CC]    - Dysphaiga- SLP evaluation     #Health maintenance  - Folic Acid   - MVI  - Ocean nasal spray    # Difficulty with access to peripheral veins-- Blood draws from Left arm Medline     #Precautions / PROPHYLAXIS:   - Falls  - ortho: Weight bearing status: WBAT   - Lungs: Aspiration, Incentive Spirometer   - DVT PPX: Eliquis 5 mg BID   ---------------------------  *l abs 10/230--stable anemia, normal renal function and down trending LFT      Liaison with family  Patient's partner present during review, details of review d/w her, detailed explanation of therapy protocol explained and she was happy with same  10/9--Partner present during review and discussed details treatment plan with her    Liaison with providers  Consult recs form multiple specialities being implemented  Surgical consult and recs 10/9--agreed with plan for AC for Rt IJ chronic thrombus    10/10 --No response to multiple calls to patient'srivate cardiologist Dr Otto Stratton  ph     ---------------------------  IDT conference on 10/31  Social Work: Await peer to peer with CM. Provided private hire list. Lives with spouse in [place] 6 months of year. DC to apt in [place] with elevator, no steps. Supportive spouse.   SLP: --  OT: Mod-max A for eating/grooming with arm support. Total A for all else. Sitting balance improving.  PT: Total A. Damaris.   RT: Declining.   DME: TBD   Barriers: 02 requirements,   Functional level on DC: Total A, WC level.  TDD: 11/7 to EMIGDIO.  ---------------------------  OUTPATIENT/FOLLOW UP:    Trae Whitney  Pulmonary Disease  [address], [address] [place] [address]  Phone: [phone]  Fax: [phone]  Follow Up Time: 2 weeks    Ryanne Allen  Rheumatology  [address] [address]  Phone: [phone]  Fax: [phone]  Follow Up Time: 1 week    Kyle Pierce  Internal Medicine  [address] [place] [address], [address] 5  [place] [address]  Phone: [phone]  Fax: [phone]  Follow Up Time: 2 weeks    Addy Powers  Pulmonary Disease  [address] [facility], [address] [id number]  Phone: [phone]  Fax: [phone]  Follow Up Time:     Kwame Hawley  Critical Care Medicine  [address] [facility], [address]  [place] [address]  Phone: [phone]  Fax: [phone]  Follow Up Time:     Neena Borrego  Rheumatology  [address] [place] [address], Floor 3  [place] [address]  Phone: [phone]  Fax: [phone]  Follow Up Time:    [facility]  [facility]  Scheduled Appointment: 09/13/2023  2:40 PM  ---------------------------   Assessment/Plan:  ALIZA FOLEY is a 62 year old female with PMH of pAFIB and sciatica; who presented to [facility] ED with SOB, and was found to have groundglass opacities and interstitial lung disease. She was treated with empiric Vancomycin and Zosyn. She underwent a bronchoscopy with bronchoalveolar lavage and pulse steroids. She was given IV diuretics for increased pulmonary congestion, but her respiratory status continued to worsen.  On 9/13, she was transferred to [facility] for ECMO requirements. She was further treated with Plasmapheresis, Rituximab and high-dose steroids, with significant improvement. Her overall hospital course was complicated by thrombocytopenia (s/p several platelet transfusions), leukocytosis (infectious workup entirely negative- s/p Vanco and Ceftriaxone), AFIB with RVR (resolved with Metoprolol), dysphagia (requiring NGT), transaminitis (secondary to acute illness and hypotension),  non-occlusive RIJ DVT (started on Lovenox). Patient now admitted for a multidisciplinary rehab program. 10-05-23 @ 13:18    * Wound care following  * Therapy plan--continue tapering oxy, Muscle strengthening, ADLs   * Pulmonary team following - continue steroid taper- Plan to wean off 02 as tolerated   * Plan for CT Chest on 11/11, per pulm   * DC planning as already discussed, staring with appeal for extension of acute rehab stay duration (SW sending notes to insurance) and other options depending on the response  * Discussed her request for methylpred tabs with higher dose preparation--they confirmed that 8mg is the highest dose and that is what patient is getting   * Discussed cardiology f/u post dc for Afib management    #Interstitial Lung Disease and Mixed connective tissue disease  - Gait Instability, ADL impairments and Functional impairments: start Comprehensive Rehab Program of PT/OT/SLP - 3 hours a day, 5 days a week  - P&O as needed   - Non-specific Interstitial Lung Disease  - Requiring ECMO   - Improvement s/p Plasmapheresis, Rituximab and high- dose steroids   - Albuterol/Ipratropium Nebulizer every 6 hours  - Mepron 1500mg daily  - PO Medrol ( due to liver dysfunction): Plan will be to taper by ~20% every 2 weeks  Medrol   64mg x 2 weeks   56mg x 2 weeks  44mg x 2 weeks   36mg x 2 weeks - Follow up Outpatient   -- Repeat CT Chest in 6 weeks   --Pulmonary team--f/u with patient and clarify when she can get immunizations    -Perform BL elbow flexion & elbow extension with antigravity in therapy  - Noted to have missed 5 days between steroid taper dosing- resumed at 56mg on 10/26- pulm recommends to continue as originally instructed   - Plan to wean 02 as tolerated   - Repeat CT Chest on 11/11     10/24--SW will apply to insurance for extension in few days and proceed with other options depending on response  F/U to earlier discussion on 10/18  Extension denied on 10/30  Case management performing peer to peer     #pAFIB/Rt Ij thrombus  - Metoprolol 25mg BID and lovenox  --- Eliquis 5mg BID - tolerating well     # Chronic Tinnitus   - ENT review and recs appreciate, Patient already made ENT appointment for f/u    # Lymphedema both legs -chronic and Rt IJ thrombus  - ACE Wrap BL LEs  - BL LE doppler negative for DVT  - BL UE doppler with chronic thrombotic changes in RIJ     #Transaminitis --LFT Down trending   - Secondary to acute illness and hypotension at [facility]  - Outpatient follow up     #Neuropathy  - Gabapentin 300 mg BID  --Work on motor strengthening, priority for UE as preferred by patient   - Check B12 level     #Sleep/Mood  - Melatonin 6mg at HS  - Psych rec Xanax 0.25mg PRN    #Skin  - Skin: Left lower abdomen ruptured blister 3.0 x 1.0, stage 2 pressure injury to right buttock 4 x1 and left buttock 3x1, stage 2 pressure injury ti right inner thigh 0.2 x 0.2, right groin wound 10.5 x 2.0, Left groin wound 5 x 5,  right neck scab wound  -- MAD to skin folds- Nystatin to submammary and abdominal pannus BID  -- MAD to L groin- cleanse with NS, Triad cream daily  -- Mad to R groin- Nystatin powder daily   -- R groin wound-- aquacel packing, Triad to periwound, Allevyn foam- daily and PRN   - Pressure injury/Skin: OOB to Chair, PT/OT   - Offload pressure, low air loss support surfaces, T&P every 2 hours   --Wound care consult-10/9 -Multiple wounds--perineal and buttock/sacral, recs appreciated   --Suggest Continue treatments as below:   Twice daily & PRN Normal Saline Cleanse of abdominal pannus & breast folds, perineal, Left & Right inner buttock erosions.  Pat thoroughly dry.   Apply Desitin generously twice daily (& PRN) to perineal, buttocks, and left groin erosions, leave open to air.    Apply Nystatin powder to abdominal pannus and breast folds.  Suggest use of Interdry cloths in folds to 'wick' moisture.  Suggest daily Normal Saline Cleanse of right groin surgical wound, pat dry.  Apply Cavillon film barrier to intact periwound skin.  Place Aquacell strip over open area, cover with foam dressing.  - Wound care following       #Pain Mgmt   - Tylenol PRN   - Gabapentin 300mg at HS     #GI/Bowel Mgmt   - Pantoprazole  - Senna  --on hold due to recent Loose BM  - PRN: Maalox   - Stool count  -  Lactobacillus BID   -- AB XR mod stool burden on transverse colon 10/23--still has mod stool burden, but moving bowel appropriately  - S/p enema and lactulose    # Alternating bowel function --commenced probiotic, latobacilus   * Leucocytosis probably steroid related and partly due to her Mixed Connective Tissue Disease, will continue monitoring     #/Bladder Mgmt --voiding     #FEN   #Dysphagia  - Diet - Minced & Moist  [CC]    - Dysphaiga- SLP evaluation     #Health maintenance  - Folic Acid   - MVI  - Ocean nasal spray    # Difficulty with access to peripheral veins-- Blood draws from Left arm Medline     #Precautions / PROPHYLAXIS:   - Falls  - ortho: Weight bearing status: WBAT   - Lungs: Aspiration, Incentive Spirometer   - DVT PPX: Eliquis 5 mg BID   ---------------------------  *l abs 10/230--stable anemia, normal renal function and down trending LFT      Liaison with family  Patient's partner present during review, details of review d/w her, detailed explanation of therapy protocol explained and she was happy with same  10/9--Partner present during review and discussed details treatment plan with her    Liaison with providers  Consult recs form multiple specialities being implemented  Surgical consult and recs 10/9--agreed with plan for AC for Rt IJ chronic thrombus    10/10 --No response to multiple calls to [facility] cardiologist Dr Otto Stratton  ph     ---------------------------  IDT conference on 10/31  Social Work: Await peer to peer with CM. Provided private hire list. Lives with spouse in [place] 6 months of year. DC to apt in [place] with elevator, no steps. Supportive spouse.   SLP: --n/a  OT: Mod-max A for eating/grooming with arm support. Total A for all else. Sitting balance improving.  PT: Total A. Damaris.   RT: Declining.   DME: TBD   Barriers: 02 requirements,   Functional level on DC: Total A, WC level.  TDD: 11/7 to EMIGDIO.  ---------------------------  OUTPATIENT/FOLLOW UP:    Trae Whitney  Pulmonary Disease  [address], [address] [place] [address]  Phone: [phone]  Fax: [phone]  Follow Up Time: 2 weeks    Ryanne Allen  Rheumatology  [address] [address]  Phone: [phone]  Fax: [phone]  Follow Up Time: 1 week    Kyle Pierce  Internal Medicine  [address] [place] [address], [address]  [place] [address]  Phone: [phone]  Fax: [phone]  Follow Up Time: 2 weeks    Addy Powers  Pulmonary Disease  [address] [facility], [facility] [address]  [place], [place] [address]  Phone: [phone]  Fax: [phone]  Follow Up Time:     Kwame Hawley  Critical Care Medicine  [address] [facility], [address]  [place] [address]  Phone: [phone]  Fax: [phone]  Follow Up Time:     Neena Borrego  Rheumatology  [address] [facility], Floor [address]  [place] [address]  Phone: [phone]  Fax: [phone]  Follow Up Time:    [facility]  [facility] [address]  Scheduled Appointment: 09/13/2023  2:40 PM  ---------------------------

## 2023-11-02 NOTE — PROGRESS NOTE ADULT - NS ATTEND AMEND GEN_ALL_CORE FT
Seen and examined, note revised    Patient happy with progress with Upper extremity movement and ADLs  This is a significant part of her therapy goal and she is excited for being on the right trajectory towards achieving this     Pharmacy clarified that methylpred 8mg is the highest dose formulary per tab    Labs today pending, but unremarkable on 10/30    Continue therapy  Methylpred tapering  Repeat CT chest 11/11 and recs by pulmonary, to evaluate lung function  Continue NC oxy tapering,  while oxy sats > 92%

## 2023-11-02 NOTE — PROGRESS NOTE ADULT - SUBJECTIVE AND OBJECTIVE BOX
CC: Non-specific Interstitial Lung Disease     Today's Subjective & Objective Findings:  Patient seen and examined, observed at bedside this AM with Dr. López.  Partner present.  Reports good night rest  BL elbow flexion/extension continues to improve- able to feed self this AM.  Weaning off 02 as being tolerated, was off oxy several hours during the day yesterday.   Last BM on 11/1, per patient.  Discussed plan for Ct chest on 11/11, per pulmonology.  No other complaints.     ROS --No dyspnea, head ache, chest or abd pain     Therapy-- engaging, motivated  Sustained improvement, Observed combing/brushing her hair with left hand, happy with this progress as its one of her therapy goals  Reports being off oxy for majority of the time during the days yesterday with normal Oxy sat  Sat on the chair most of the day  Extension of acute rehab treatment duration denied by insurance, but SW working on alternative coverage arrangement with patient/Appeal of this decision       Vital Signs Last 24 Hrs  T(C): 36.6 (02 Nov 2023 08:28), Max: 36.8 (01 Nov 2023 21:00)  T(F): 97.9 (02 Nov 2023 08:28), Max: 98.2 (01 Nov 2023 21:00)  HR: 74 (02 Nov 2023 08:28) (74 - 83)  BP: 108/73 (02 Nov 2023 08:28) (108/73 - 114/74)  BP(mean): --  RR: 16 (02 Nov 2023 08:28) (16 - 16)  SpO2: 97% (02 Nov 2023 08:28) (95% - 97%)      PHYSICAL EXAM:  Gen -  Comfortable,   In no acute distress IL NC oxy 1L  HEENT - , nostrils are moist, no bleeding, mild icterus  Neck - Supple, No limited ROM, O2 via NC  Pulm - good   Cardiovascular - RRR, S1S2  Chest - good chest expansion,   Abdomen - Soft, non tender, +BS,   Extremities - No Cyanosis, no clubbing, leg edema b/l feet,, non pitting,    no calf tenderness, LUE Med line    Neuro-     Cognitive - awake, alert, fully oriented, engaging, speech normal      Motor -                    LEFT    UE - 4/5                    RIGHT UE - 4/5                     LEFT    LE - 3+-/5, distally and 2/5 proximal,                    RIGHT LE - 3+-/5  limitted by leg edema,, which is reducing      Sensory - Intact        Reflexes - DTR 1+      Coordination - FTN  impaired  due to weakness   MSK: soreness and weakness  Psychiatric - Mood stable, Affect WNL  Skin: Left lower abdomen ruptured blister 3.0 x 1.0, stage 2 pressure injury to right buttock 4 x1 and left buttock 3x1, stage 2 pressure injury ti right inner thigh 0.2 x 0.2, right groin wound 10.5 x 2.0, Left groin wound 5 x 5,    Hand swelling reducing  LE edema is limited to dorsum    MMT--Able to contact B/L upper back muscles,   Elbow abduction and adduction, gravity eliminated 2+      LABS:            MEDICATIONS  (STANDING):  ammonium lactate 12% Lotion 1 Application(s) Topical every 12 hours  apixaban 5 milliGRAM(s) Oral every 12 hours  artificial  tears Solution 1 Drop(s) Both EYES every 12 hours  ascorbic acid 500 milliGRAM(s) Oral daily  atovaquone  Suspension 1500 milliGRAM(s) Oral daily  bisacodyl 5 milliGRAM(s) Oral <User Schedule>  dextrose 5%. 1000 milliLiter(s) (100 mL/Hr) IV Continuous <Continuous>  dextrose 5%. 1000 milliLiter(s) (50 mL/Hr) IV Continuous <Continuous>  dextrose 50% Injectable 12.5 Gram(s) IV Push once  dextrose 50% Injectable 25 Gram(s) IV Push once  dextrose 50% Injectable 25 Gram(s) IV Push once  folic acid 1 milliGRAM(s) Oral daily  gabapentin 300 milliGRAM(s) Oral two times a day  glucagon  Injectable 1 milliGRAM(s) IntraMuscular once  hydrocortisone hemorrhoidal Suppository 1 Suppository(s) Rectal two times a day  influenza   Vaccine 0.5 milliLiter(s) IntraMuscular once  lactobacillus acidophilus 1 Tablet(s) Oral two times a day with meals  melatonin 6 milliGRAM(s) Oral at bedtime  methylPREDNISolone 56 milliGRAM(s) Oral daily  methylPREDNISolone   Oral   metoprolol tartrate 25 milliGRAM(s) Oral two times a day  multivitamin 1 Tablet(s) Oral daily  nystatin Powder 1 Application(s) Topical two times a day  pantoprazole    Tablet 40 milliGRAM(s) Oral before breakfast  psyllium Powder 1 Packet(s) Oral daily  sodium chloride 0.9% lock flush 5 milliLiter(s) IV Push two times a day  zinc oxide 40% Paste 1 Application(s) Topical three times a day    MEDICATIONS  (PRN):  albuterol/ipratropium for Nebulization 3 milliLiter(s) Nebulizer every 6 hours PRN Shortness of Breath and/or Wheezing  ALPRAZolam 0.25 milliGRAM(s) Oral every 6 hours PRN Anxiety  aluminum hydroxide/magnesium hydroxide/simethicone Suspension 30 milliLiter(s) Oral every 4 hours PRN Dyspepsia  dextrose Oral Gel 15 Gram(s) Oral once PRN Blood Glucose LESS THAN 70 milliGRAM(s)/deciliter  loperamide 2 milliGRAM(s) Oral three times a day PRN Diarrhea  polyethylene glycol 3350 17 Gram(s) Oral daily PRN Constipation  SIMETHICONE SOFTGELS 250 milliGRAM(s) 250 milliGRAM(s) Oral three times a day PRN for gas  sodium chloride 0.65% Nasal 1 Spray(s) Both Nostrils every 8 hours PRN Nasal Congestion   CC: Non-specific Interstitial Lung Disease     Today's Subjective & Objective Findings:  Patient seen and examined, observed at bedside this AM with Dr. López.  Partner present.  Reports good night rest  BL elbow flexion/extension continues to improve- able to feed self this AM. and brush her hair  Weaning off 02 as being tolerated, was off oxy several hours during the day yesterday.   Last BM on 11/1, per patient.  Discussed plan for Ct chest on 11/11, per pulmonology. agreed to same  She asked if pharmacy could provide methylpred tabs with higher dose preparation to reduce the number of the methylpred tabs she is taking  Assured patient that we will explore this with pharmacy  No other complaints.     ROS --No dyspnea, head ache, chest or abd pain     Therapy-- engaging, motivated  Observed during review, further improvement of upper extremity function--combing and brushing her hair, Left hand more active compared to righ  Continue progress with tapering from NC oxy, staying extended period off oxy during the day  Oxy sat dropped belo 90 during therapy yesterday, but normalized with recommencement of NC oxy 1.5 L  Extension of acute rehab treatment duration denied by insurance, but SW working on alternative coverage arrangement with patient/Appeal of this decision       Vital Signs Last 24 Hrs  T(C): 36.6 (02 Nov 2023 08:28), Max: 36.8 (01 Nov 2023 21:00)  T(F): 97.9 (02 Nov 2023 08:28), Max: 98.2 (01 Nov 2023 21:00)  HR: 74 (02 Nov 2023 08:28) (74 - 83)  BP: 108/73 (02 Nov 2023 08:28) (108/73 - 114/74)  RR: 16 (02 Nov 2023 08:28) (16 - 16)  SpO2: 97% (02 Nov 2023 08:28) (95% - 97%)      PHYSICAL EXAM:  Gen -  Comfortable,   In no acute distress IL NC oxy 1L  HEENT - , nostrils are moist, no bleeding, mild icterus, non progressive  Neck - Supple, No limited ROM, O2 via NC  Pulm - good   Cardiovascular - RRR, S1S2  Chest - good chest expansion,   Abdomen - Soft, non tender, +BS,   Extremities - No Cyanosis, no clubbing, leg edema b/l feet,, non pitting,    no calf tenderness, LUE Med line    Neuro-     Cognitive - awake, alert, fully oriented, engaging, speech normal      Motor -                    LEFT    UE - 4/5                    RIGHT UE - 4/5                     LEFT    LE - 3+-/5, distally and 2/5 proximal,                    RIGHT LE - 3+-/5  limitted by leg edema,, which is reducing      Sensory - Intact        Reflexes - DTR 1+      Coordination - FTN  impaired  due to weakness, but improving   MSK: soreness and weakness  Psychiatric - Mood stable, Affect WNL  Skin: Left lower abdomen ruptured blister 3.0 x 1.0, stage 2 pressure injury to right buttock 4 x1 and left buttock 3x1, stage 2 pressure injury ti right inner thigh 0.2 x 0.2, right groin wound 10.5 x 2.0, Left groin wound 5 x 5,    Hand swelling reducing  LE edema is limited to dorsum    MMT--Able to contact B/L upper back muscles,   Elbow abduction and adduction, gravity eliminated 2+      LABS 11/2--pending   10/30--Hb 9.3 Cr 0.36      MEDICATIONS  (STANDING):  ammonium lactate 12% Lotion 1 Application(s) Topical every 12 hours  apixaban 5 milliGRAM(s) Oral every 12 hours  artificial  tears Solution 1 Drop(s) Both EYES every 12 hours  ascorbic acid 500 milliGRAM(s) Oral daily  atovaquone  Suspension 1500 milliGRAM(s) Oral daily  bisacodyl 5 milliGRAM(s) Oral <User Schedule>  dextrose 5%. 1000 milliLiter(s) (100 mL/Hr) IV Continuous <Continuous>  dextrose 5%. 1000 milliLiter(s) (50 mL/Hr) IV Continuous <Continuous>  dextrose 50% Injectable 12.5 Gram(s) IV Push once  dextrose 50% Injectable 25 Gram(s) IV Push once  dextrose 50% Injectable 25 Gram(s) IV Push once  folic acid 1 milliGRAM(s) Oral daily  gabapentin 300 milliGRAM(s) Oral two times a day  glucagon  Injectable 1 milliGRAM(s) IntraMuscular once  hydrocortisone hemorrhoidal Suppository 1 Suppository(s) Rectal two times a day  influenza   Vaccine 0.5 milliLiter(s) IntraMuscular once  lactobacillus acidophilus 1 Tablet(s) Oral two times a day with meals  melatonin 6 milliGRAM(s) Oral at bedtime  methylPREDNISolone 56 milliGRAM(s) Oral daily  methylPREDNISolone   Oral   metoprolol tartrate 25 milliGRAM(s) Oral two times a day  multivitamin 1 Tablet(s) Oral daily  nystatin Powder 1 Application(s) Topical two times a day  pantoprazole    Tablet 40 milliGRAM(s) Oral before breakfast  psyllium Powder 1 Packet(s) Oral daily  sodium chloride 0.9% lock flush 5 milliLiter(s) IV Push two times a day  zinc oxide 40% Paste 1 Application(s) Topical three times a day    MEDICATIONS  (PRN):  albuterol/ipratropium for Nebulization 3 milliLiter(s) Nebulizer every 6 hours PRN Shortness of Breath and/or Wheezing  ALPRAZolam 0.25 milliGRAM(s) Oral every 6 hours PRN Anxiety  aluminum hydroxide/magnesium hydroxide/simethicone Suspension 30 milliLiter(s) Oral every 4 hours PRN Dyspepsia  dextrose Oral Gel 15 Gram(s) Oral once PRN Blood Glucose LESS THAN 70 milliGRAM(s)/deciliter  loperamide 2 milliGRAM(s) Oral three times a day PRN Diarrhea  polyethylene glycol 3350 17 Gram(s) Oral daily PRN Constipation  SIMETHICONE SOFTGELS 250 milliGRAM(s) 250 milliGRAM(s) Oral three times a day PRN for gas  sodium chloride 0.65% Nasal 1 Spray(s) Both Nostrils every 8 hours PRN Nasal Congestion

## 2023-11-03 LAB
APPEARANCE UR: CLEAR — SIGNIFICANT CHANGE UP
APPEARANCE UR: CLEAR — SIGNIFICANT CHANGE UP
BACTERIA # UR AUTO: NEGATIVE /HPF — SIGNIFICANT CHANGE UP
BACTERIA # UR AUTO: NEGATIVE /HPF — SIGNIFICANT CHANGE UP
BILIRUB UR-MCNC: NEGATIVE — SIGNIFICANT CHANGE UP
BILIRUB UR-MCNC: NEGATIVE — SIGNIFICANT CHANGE UP
COD CRY URNS QL: PRESENT
COD CRY URNS QL: PRESENT
COLOR SPEC: YELLOW — SIGNIFICANT CHANGE UP
COLOR SPEC: YELLOW — SIGNIFICANT CHANGE UP
COMMENT - URINE: SIGNIFICANT CHANGE UP
COMMENT - URINE: SIGNIFICANT CHANGE UP
DIFF PNL FLD: NEGATIVE — SIGNIFICANT CHANGE UP
DIFF PNL FLD: NEGATIVE — SIGNIFICANT CHANGE UP
EPI CELLS # UR: 5 — SIGNIFICANT CHANGE UP
EPI CELLS # UR: 5 — SIGNIFICANT CHANGE UP
GLUCOSE UR QL: NEGATIVE MG/DL — SIGNIFICANT CHANGE UP
GLUCOSE UR QL: NEGATIVE MG/DL — SIGNIFICANT CHANGE UP
KETONES UR-MCNC: NEGATIVE MG/DL — SIGNIFICANT CHANGE UP
KETONES UR-MCNC: NEGATIVE MG/DL — SIGNIFICANT CHANGE UP
LEUKOCYTE ESTERASE UR-ACNC: ABNORMAL
LEUKOCYTE ESTERASE UR-ACNC: ABNORMAL
NITRITE UR-MCNC: NEGATIVE — SIGNIFICANT CHANGE UP
NITRITE UR-MCNC: NEGATIVE — SIGNIFICANT CHANGE UP
PH UR: 6 — SIGNIFICANT CHANGE UP (ref 5–8)
PH UR: 6 — SIGNIFICANT CHANGE UP (ref 5–8)
PROT UR-MCNC: NEGATIVE MG/DL — SIGNIFICANT CHANGE UP
PROT UR-MCNC: NEGATIVE MG/DL — SIGNIFICANT CHANGE UP
RBC CASTS # UR COMP ASSIST: 0 /HPF — SIGNIFICANT CHANGE UP (ref 0–4)
RBC CASTS # UR COMP ASSIST: 0 /HPF — SIGNIFICANT CHANGE UP (ref 0–4)
SP GR SPEC: 1.02 — SIGNIFICANT CHANGE UP (ref 1–1.03)
SP GR SPEC: 1.02 — SIGNIFICANT CHANGE UP (ref 1–1.03)
UROBILINOGEN FLD QL: 1 MG/DL — SIGNIFICANT CHANGE UP (ref 0.2–1)
UROBILINOGEN FLD QL: 1 MG/DL — SIGNIFICANT CHANGE UP (ref 0.2–1)
WBC UR QL: 3 /HPF — SIGNIFICANT CHANGE UP (ref 0–5)
WBC UR QL: 3 /HPF — SIGNIFICANT CHANGE UP (ref 0–5)

## 2023-11-03 PROCEDURE — 99232 SBSQ HOSP IP/OBS MODERATE 35: CPT

## 2023-11-03 RX ORDER — ALPRAZOLAM 0.25 MG
0.25 TABLET ORAL EVERY 6 HOURS
Refills: 0 | Status: DISCONTINUED | OUTPATIENT
Start: 2023-11-03 | End: 2023-11-08

## 2023-11-03 RX ADMIN — Medication 56 MILLIGRAM(S): at 08:54

## 2023-11-03 RX ADMIN — ZINC OXIDE 1 APPLICATION(S): 200 OINTMENT TOPICAL at 13:05

## 2023-11-03 RX ADMIN — Medication 1 APPLICATION(S): at 17:45

## 2023-11-03 RX ADMIN — Medication 25 MILLIGRAM(S): at 20:24

## 2023-11-03 RX ADMIN — NYSTATIN CREAM 1 APPLICATION(S): 100000 CREAM TOPICAL at 08:55

## 2023-11-03 RX ADMIN — PANTOPRAZOLE SODIUM 40 MILLIGRAM(S): 20 TABLET, DELAYED RELEASE ORAL at 09:37

## 2023-11-03 RX ADMIN — Medication 1 DROP(S): at 08:55

## 2023-11-03 RX ADMIN — Medication 1 TABLET(S): at 08:54

## 2023-11-03 RX ADMIN — ZINC OXIDE 1 APPLICATION(S): 200 OINTMENT TOPICAL at 08:55

## 2023-11-03 RX ADMIN — Medication 6 MILLIGRAM(S): at 20:24

## 2023-11-03 RX ADMIN — Medication 5 MILLIGRAM(S): at 08:54

## 2023-11-03 RX ADMIN — GABAPENTIN 300 MILLIGRAM(S): 400 CAPSULE ORAL at 08:54

## 2023-11-03 RX ADMIN — SODIUM CHLORIDE 5 MILLILITER(S): 9 INJECTION INTRAMUSCULAR; INTRAVENOUS; SUBCUTANEOUS at 17:40

## 2023-11-03 RX ADMIN — APIXABAN 5 MILLIGRAM(S): 2.5 TABLET, FILM COATED ORAL at 20:23

## 2023-11-03 RX ADMIN — ATOVAQUONE 1500 MILLIGRAM(S): 750 SUSPENSION ORAL at 16:18

## 2023-11-03 RX ADMIN — Medication 1 TABLET(S): at 12:59

## 2023-11-03 RX ADMIN — Medication 1 TABLET(S): at 17:43

## 2023-11-03 RX ADMIN — Medication 1 APPLICATION(S): at 08:55

## 2023-11-03 RX ADMIN — Medication 500 MILLIGRAM(S): at 12:59

## 2023-11-03 RX ADMIN — Medication 1 MILLIGRAM(S): at 12:59

## 2023-11-03 RX ADMIN — GABAPENTIN 300 MILLIGRAM(S): 400 CAPSULE ORAL at 17:43

## 2023-11-03 RX ADMIN — APIXABAN 5 MILLIGRAM(S): 2.5 TABLET, FILM COATED ORAL at 08:54

## 2023-11-03 RX ADMIN — SODIUM CHLORIDE 5 MILLILITER(S): 9 INJECTION INTRAMUSCULAR; INTRAVENOUS; SUBCUTANEOUS at 08:53

## 2023-11-03 RX ADMIN — ZINC OXIDE 1 APPLICATION(S): 200 OINTMENT TOPICAL at 20:24

## 2023-11-03 RX ADMIN — NYSTATIN CREAM 1 APPLICATION(S): 100000 CREAM TOPICAL at 17:45

## 2023-11-03 RX ADMIN — Medication 25 MILLIGRAM(S): at 08:54

## 2023-11-03 NOTE — BH CONSULTATION LIAISON PROGRESS NOTE - NSBHASSESSMENTFT_PSY_ALL_CORE
Pt is a 61 y/o female with a hx of interstitial lung disease, admitted for functional deficits, being evaluated by psychiatry. Pt presents with demoralization secondary to her current health status compared to her usual state of health. Psychiatry asked to see patient in tandem with psychology to consider psychopharmacological interventions, pt agreed to Alprazolam prn which she has not used, would defer use of SSRI for now.

## 2023-11-03 NOTE — BH CONSULTATION LIAISON PROGRESS NOTE - NSBHFUPINTERVALHXFT_PSY_A_CORE
HPI:  Zo Hager is a 62 year old female with PMH of pAFIB and sciatica; who presented to [facility] ED with SOB. She has been experiencing chronic SOB and non-productive cough for several months and was worked up in [place] where she had a CT scan which resulted negative.  She has been evaluated by Pulmonology and Rheumatology and was ruled out for lupus and immunological disorders.  She recently went to Urgent Care due to SOB with ambulating short distances (12-15 feet), and an XR was concerning for BL LL infiltrates. While at [facility],  CXR and CTA were significant for ground glass opacities, and interstitial lung disease, respectively. She was treated with empiric Vancomycin and Zosyn. She underwent a bronchoscopy with bronchoalveolar lavage and pulse steroids. She was given IV diuretics for increased pulmonary congestion, but her respiratory status continued to worsen.  On 9/13, she was transferred to [facility] for ECMO requirements. She was further treated with Plasmapheresis, Rituximab and high-dose steroids, with significant improvement.   Her overall hospital course was complicated by thrombocytopenia (s/p several platelet transfusions), leukocytosis (infectious workup entirely negative- s/p Vanco and Ceftriaxone), AFIB with RVR (resolved with Metoprolol), dysphagia (requiring NGT), transaminitis (secondary to acute illness and hypotension),  non-occlusive RIJ DVT (started on Lovenox). PM&R was consulted and deemed her an appropriate candidate for IRF. She was medically stabilized and cleared for discharge to [facility].  (05 Oct 2023 13:13)    Psychiatric C/L Note: Psychiatry called to see patient due to concerns about her reaction to and adjustment to current medical condition. Pt without use of her legs and limited use of her upper extremities. Pt had been expecting to recover from her interstitial lung disease, and then suddenly ended up on ECMO. Pt reports she did not sleep in ICU and was fearful to do so. She also had the sense of being weighed down by her blankets, and being unable to even scratch her face on her own. " I'm just a head in a bed." Pt reports feeling her rehab course was affected by a few factors ( e.g episodes of diarrhea, steroid dose being inadvertently off the MAR, wound care needed for skin breakdown). Pt now feels "behind", and is not sure what to expect in terms of level of recovery. Pt denied being depressed emotionally but her circumstances are depressing, has felt hopeless and helpless at times. Pt denied being suicidal, homicidal, no evidence of delirium, alejandro or psychosis. Denied issues with her memory or concentration, feels some pressure to maintain a brighter outlook than she feels at times, has increased appetite due to steroids. No panic symptoms with exception of feeling "in" her bed rather than "on  it", due to mattress not being firm. Pt amenable to use of Alprazolam for anxiety, she has taken Valium rarely in the past, but describes herself as someone who would not previously even take aspirin but pushes through all types of barriers, and has been particularly successful in business.    (30 Oct 2023 18:41)    Psychiatry C/L  Note: Pt seen and evaluated today, states she has made some of her goals regarding her upper body. She was able to scratch her face, and feed herself some finger foods ( grapes), she cannot open containers or attend to most of her ADL's by herself. Pt would want to spend more time in acute rehab, and feels if stepped down to EMIGDIO level of care , would get depressed and demoralized as the level of PT/OT/Speech is not the same in time spent or intensity.   Pt reports anxiety improved, outlook improved, no acute psychiatric intervention warranted. Plan is continue prn Alprazolam, pt had not utilized this at the time of my interview. No further tearfulness or hopelessness. Will continue to offer emotional support. Agree that patient is motivated and rehab course had been affected by pain of her wounds s/p ECMO and bedsore as well as bouts of diarrhea, all of which are improving.

## 2023-11-03 NOTE — BH CONSULTATION LIAISON PROGRESS NOTE - NSBHCHARTREVIEWLAB_PSY_A_CORE FT
11-02    136  |  101  |  22  ----------------------------<  205<H>  3.9   |  28  |  0.70    Ca    9.0      02 Nov 2023 15:40    TPro  5.1<L>  /  Alb  2.4<L>  /  TBili  1.3<H>  /  DBili  x   /  AST  68<H>  /  ALT  114<H>  /  AlkPhos  338<H>  11-02                          10.0   12.15 )-----------( 344      ( 02 Nov 2023 15:40 )             32.5

## 2023-11-03 NOTE — BH CONSULTATION LIAISON PROGRESS NOTE - NSBHMSEINTELL_PSY_A_CORE
2200  Bedside turnover given to me by KUSHAL Jackson. Pt is awake in bed, non-verbal. Repositioned all extremties and suctioned trach. Condom catheter assessed and Iv assessed. SBAR< MAR ed summary given to me with a chance to ask questions. Patient is moving all around in the bed and attempting to pull at his equipment  2300 given ativan for restlessness, temp and all vitals taken, asked pt if he was having pain, he shook his head no. He keeps reaching up for his ng tube and trach. I am attempting to reorient him, informing him he is in the hospital and needs to leave that alone for now. Informed him it was his ng tube that infuses feedings and his trach to help him breath. 2355 tube feedings turned off per NPO order for am procedure. Patient suctioned and face washed. 0010 blood glucose assessed, pt repositioned c/o legs hurting when I touched them he winced. I put both legs up on a pillow. 0125 asleep on monitor, assessed cardona and fms system. No signs of distress. Vitals WNL. 0400 given chlorhexidine bath for surgical procedure today. 0715 bedside turnover given to [employer]. Pt is in bed asleep on the cardiac telemetry monitor, he has been bathed and room tidied. No signs of distress, on cardiac telemetry monitor. Vitals WNL. SBAR< MAR< ED summary given with a chance to ask questions. Condom catheter in place with FMS. IV no abnormalities. Problem: Falls - Risk of  Goal: *Absence of Falls  Description: Document Pippa Crowell Fall Risk and appropriate interventions in the flowsheet.   Outcome: Progressing Towards Goal  Note: Fall Risk Interventions:        Mentation Interventions: Adequate sleep, hydration, pain control, Evaluate medications/consider consulting pharmacy     Medication Interventions: Evaluate medications/consider consulting pharmacy     Elimination Interventions: Bed/chair exit alarm, Toileting schedule/hourly rounds    Problem: Pressure Injury - Risk of  Goal: *Prevention of pressure injury  Description: Document Lang Scale and appropriate interventions in the flowsheet. Outcome: Progressing Towards Goal  Note: Pressure Injury Interventions:  Sensory Interventions: Assess changes in LOC     Moisture Interventions: Absorbent underpads     Activity Interventions: Pressure redistribution bed/mattress(bed type)     Mobility Interventions: HOB 30 degrees or less, Pressure redistribution bed/mattress (bed type)     Nutrition Interventions: Document food/fluid/supplement intake     Friction and Shear Interventions: HOB 30 degrees or less        Problem: Diabetes Self-Management  Goal: *Disease process and treatment process  Description: Define diabetes and identify own type of diabetes; list 3 options for treating diabetes. Outcome: Progressing Towards Goal  Goal: *Incorporating nutritional management into lifestyle  Description: Describe effect of type, amount and timing of food on blood glucose; list 3 methods for planning meals. Outcome: Progressing Towards Goal  Goal: *Incorporating physical activity into lifestyle  Description: State effect of exercise on blood glucose levels. Outcome: Progressing Towards Goal  Goal: *Developing strategies to promote health/change behavior  Description: Define the ABC's of diabetes; identify appropriate screenings, schedule and personal plan for screenings. Outcome: Progressing Towards Goal  Goal: *Using medications safely  Description: State effect of diabetes medications on diabetes; name diabetes medication taking, action and side effects.   Outcome: Progressing Towards Goal Average

## 2023-11-03 NOTE — BH CONSULTATION LIAISON PROGRESS NOTE - NSBHCHARTREVIEWVS_PSY_A_CORE FT
Vital Signs Last 24 Hrs  T(C): 36.8 (03 Nov 2023 08:51), Max: 36.8 (03 Nov 2023 08:51)  T(F): 98.3 (03 Nov 2023 08:51), Max: 98.3 (03 Nov 2023 08:51)  HR: 82 (03 Nov 2023 08:51) (77 - 82)  BP: 102/67 (03 Nov 2023 08:51) (102/67 - 111/69)  BP(mean): --  RR: 16 (03 Nov 2023 08:51) (16 - 16)  SpO2: 96% (03 Nov 2023 08:51) (96% - 96%)    Parameters below as of 03 Nov 2023 08:51  Patient On (Oxygen Delivery Method): nasal cannula  O2 Flow (L/min): 1

## 2023-11-03 NOTE — BH CONSULTATION LIAISON PROGRESS NOTE - CURRENT MEDICATION
MEDICATIONS  (STANDING):  ammonium lactate 12% Lotion 1 Application(s) Topical every 12 hours  apixaban 5 milliGRAM(s) Oral every 12 hours  artificial  tears Solution 1 Drop(s) Both EYES every 12 hours  ascorbic acid 500 milliGRAM(s) Oral daily  atovaquone  Suspension 1500 milliGRAM(s) Oral daily  bisacodyl 5 milliGRAM(s) Oral <User Schedule>  dextrose 5%. 1000 milliLiter(s) (100 mL/Hr) IV Continuous <Continuous>  dextrose 5%. 1000 milliLiter(s) (50 mL/Hr) IV Continuous <Continuous>  dextrose 50% Injectable 12.5 Gram(s) IV Push once  dextrose 50% Injectable 25 Gram(s) IV Push once  dextrose 50% Injectable 25 Gram(s) IV Push once  folic acid 1 milliGRAM(s) Oral daily  gabapentin 300 milliGRAM(s) Oral two times a day  glucagon  Injectable 1 milliGRAM(s) IntraMuscular once  hydrocortisone hemorrhoidal Suppository 1 Suppository(s) Rectal two times a day  influenza   Vaccine 0.5 milliLiter(s) IntraMuscular once  lactobacillus acidophilus 1 Tablet(s) Oral two times a day with meals  melatonin 6 milliGRAM(s) Oral at bedtime  methylPREDNISolone   Oral   methylPREDNISolone 56 milliGRAM(s) Oral daily  metoprolol tartrate 25 milliGRAM(s) Oral two times a day  multivitamin 1 Tablet(s) Oral daily  nystatin Powder 1 Application(s) Topical two times a day  pantoprazole    Tablet 40 milliGRAM(s) Oral before breakfast  psyllium Powder 1 Packet(s) Oral daily  sodium chloride 0.9% lock flush 5 milliLiter(s) IV Push two times a day  zinc oxide 40% Paste 1 Application(s) Topical three times a day    MEDICATIONS  (PRN):  albuterol/ipratropium for Nebulization 3 milliLiter(s) Nebulizer every 6 hours PRN Shortness of Breath and/or Wheezing  ALPRAZolam 0.25 milliGRAM(s) Oral every 6 hours PRN Anxiety  aluminum hydroxide/magnesium hydroxide/simethicone Suspension 30 milliLiter(s) Oral every 4 hours PRN Dyspepsia  dextrose Oral Gel 15 Gram(s) Oral once PRN Blood Glucose LESS THAN 70 milliGRAM(s)/deciliter  loperamide 2 milliGRAM(s) Oral three times a day PRN Diarrhea  polyethylene glycol 3350 17 Gram(s) Oral daily PRN Constipation  SIMETHICONE SOFTGELS 250 milliGRAM(s) 250 milliGRAM(s) Oral three times a day PRN for gas  sodium chloride 0.65% Nasal 1 Spray(s) Both Nostrils every 8 hours PRN Nasal Congestion

## 2023-11-03 NOTE — PROGRESS NOTE ADULT - NS ATTEND AMEND GEN_ALL_CORE FT
Seen and examined, note revised    Reports a restful night  Urinary frequency noted  Tolerating increased dose of gabapentin for neuropath of both feet  VIT B12 WNL IN Sept 2023    Clinically stable    Continue therapy  Urine analysis ordered

## 2023-11-03 NOTE — PROGRESS NOTE ADULT - SUBJECTIVE AND OBJECTIVE BOX
CC: Non-specific Interstitial Lung Disease     Today's Subjective & Objective Findings:  Patient seen and examined, observed at bedside this AM with Dr. López.  Partner present.  Reports good night rest  BL elbow flexion/extension continues to improve.   Weaning off 02 as being tolerated, was off oxy several hours during the day yesterday.   Last BM on 11/2, small volume, per patient.  Experiencing urinary frequency- discussed plan for UA.   No other complaints.     ROS --No dyspnea, head ache, chest or abd pain     Therapy-- engaging, motivated  Observed during review, further improvement of upper extremity function--combing and brushing her hair, Left hand more active compared to righ  Continue progress with tapering from NC oxy, staying extended period off oxy during the day  Oxy sat dropped belo 90 during therapy yesterday, but normalized with recommencement of NC oxy 1.5 L  Extension of acute rehab treatment duration denied by insurance, but SW working on alternative coverage arrangement with patient/Appeal of this decision       Vital Signs Last 24 Hrs  T(C): 36.8 (03 Nov 2023 08:51), Max: 36.8 (03 Nov 2023 08:51)  T(F): 98.3 (03 Nov 2023 08:51), Max: 98.3 (03 Nov 2023 08:51)  HR: 82 (03 Nov 2023 08:51) (77 - 82)  BP: 102/67 (03 Nov 2023 08:51) (102/67 - 111/69)  BP(mean): --  RR: 16 (03 Nov 2023 08:51) (16 - 16)  SpO2: 96% (03 Nov 2023 08:51) (96% - 96%)      PHYSICAL EXAM:  Gen -  Comfortable,   In no acute distress IL NC oxy 1L  HEENT - , nostrils are moist, no bleeding, mild icterus, non progressive  Neck - Supple, No limited ROM, O2 via NC  Pulm - good   Cardiovascular - RRR, S1S2  Chest - good chest expansion,   Abdomen - Soft, non tender, +BS,   Extremities - No Cyanosis, no clubbing, leg edema b/l feet,, non pitting,    no calf tenderness, LUE Med line    Neuro-     Cognitive - awake, alert, fully oriented, engaging, speech normal      Motor -                    LEFT    UE - 4/5                    RIGHT UE - 4/5                     LEFT    LE - 3+-/5, distally and 2/5 proximal,                    RIGHT LE - 3+-/5  limitted by leg edema,, which is reducing      Sensory - Intact        Reflexes - DTR 1+      Coordination - FTN  impaired  due to weakness, but improving   MSK: soreness and weakness  Psychiatric - Mood stable, Affect WNL  Skin: Left lower abdomen ruptured blister 3.0 x 1.0, stage 2 pressure injury to right buttock 4 x1 and left buttock 3x1, stage 2 pressure injury ti right inner thigh 0.2 x 0.2, right groin wound 10.5 x 2.0, Left groin wound 5 x 5,    Hand swelling reducing  LE edema is limited to dorsum    MMT--Able to contact B/L upper back muscles,   Elbow abduction and adduction, gravity eliminated 2+      LABS:                        10.0   12.15 )-----------( 344      ( 02 Nov 2023 15:40 )             32.5     11-02    136  |  101  |  22  ----------------------------<  205<H>  3.9   |  28  |  0.70    Ca    9.0      02 Nov 2023 15:40    TPro  5.1<L>  /  Alb  2.4<L>  /  TBili  1.3<H>  /  DBili  x   /  AST  68<H>  /  ALT  114<H>  /  AlkPhos  338<H>  11-02      Urinalysis Basic - ( 02 Nov 2023 15:40 )    Color: x / Appearance: x / SG: x / pH: x  Gluc: 205 mg/dL / Ketone: x  / Bili: x / Urobili: x   Blood: x / Protein: x / Nitrite: x   Leuk Esterase: x / RBC: x / WBC x   Sq Epi: x / Non Sq Epi: x / Bacteria: x      CAPILLARY BLOOD GLUCOSE        MEDICATIONS  (STANDING):  ammonium lactate 12% Lotion 1 Application(s) Topical every 12 hours  apixaban 5 milliGRAM(s) Oral every 12 hours  artificial  tears Solution 1 Drop(s) Both EYES every 12 hours  ascorbic acid 500 milliGRAM(s) Oral daily  atovaquone  Suspension 1500 milliGRAM(s) Oral daily  bisacodyl 5 milliGRAM(s) Oral <User Schedule>  dextrose 5%. 1000 milliLiter(s) (100 mL/Hr) IV Continuous <Continuous>  dextrose 5%. 1000 milliLiter(s) (50 mL/Hr) IV Continuous <Continuous>  dextrose 50% Injectable 12.5 Gram(s) IV Push once  dextrose 50% Injectable 25 Gram(s) IV Push once  dextrose 50% Injectable 25 Gram(s) IV Push once  folic acid 1 milliGRAM(s) Oral daily  gabapentin 300 milliGRAM(s) Oral two times a day  glucagon  Injectable 1 milliGRAM(s) IntraMuscular once  hydrocortisone hemorrhoidal Suppository 1 Suppository(s) Rectal two times a day  influenza   Vaccine 0.5 milliLiter(s) IntraMuscular once  lactobacillus acidophilus 1 Tablet(s) Oral two times a day with meals  melatonin 6 milliGRAM(s) Oral at bedtime  methylPREDNISolone   Oral   methylPREDNISolone 56 milliGRAM(s) Oral daily  metoprolol tartrate 25 milliGRAM(s) Oral two times a day  multivitamin 1 Tablet(s) Oral daily  nystatin Powder 1 Application(s) Topical two times a day  pantoprazole    Tablet 40 milliGRAM(s) Oral before breakfast  psyllium Powder 1 Packet(s) Oral daily  sodium chloride 0.9% lock flush 5 milliLiter(s) IV Push two times a day  zinc oxide 40% Paste 1 Application(s) Topical three times a day    MEDICATIONS  (PRN):  albuterol/ipratropium for Nebulization 3 milliLiter(s) Nebulizer every 6 hours PRN Shortness of Breath and/or Wheezing  ALPRAZolam 0.25 milliGRAM(s) Oral every 6 hours PRN Anxiety  aluminum hydroxide/magnesium hydroxide/simethicone Suspension 30 milliLiter(s) Oral every 4 hours PRN Dyspepsia  dextrose Oral Gel 15 Gram(s) Oral once PRN Blood Glucose LESS THAN 70 milliGRAM(s)/deciliter  loperamide 2 milliGRAM(s) Oral three times a day PRN Diarrhea  polyethylene glycol 3350 17 Gram(s) Oral daily PRN Constipation  SIMETHICONE SOFTGELS 250 milliGRAM(s) 250 milliGRAM(s) Oral three times a day PRN for gas  sodium chloride 0.65% Nasal 1 Spray(s) Both Nostrils every 8 hours PRN Nasal Congestion   CC: Non-specific Interstitial Lung Disease     Today's Subjective & Objective Findings:  Patient seen and examined, observed at bedside this AM with Dr. López.  Partner present.  Reports good night rest  BL elbow flexion/extension continues to improve.   Weaning off 02 as being tolerated, was off oxy several hours during the day yesterday.   Last BM on 11/2, small volume, per patient.  Experiencing urinary frequency- discussed plan for UA.   No other complaints.     ROS --No dyspnea, head ache, chest or abd pain     Therapy-- engaging, motivated  Observed during review, further improvement of upper extremity function--combing and brushing her hair, Left hand more active compared to righ  Continue progress with tapering from NC oxy, staying extended period off oxy during the day  Oxy sat dropped belo 90 during therapy yesterday, but normalized with recommencement of NC oxy 1.5 L  Extension of acute rehab treatment duration denied by insurance, but SW working on alternative coverage arrangement with patient/Appeal of this decision       Vital Signs Last 24 Hrs  T(C): 36.8 (03 Nov 2023 08:51), Max: 36.8 (03 Nov 2023 08:51)  T(F): 98.3 (03 Nov 2023 08:51), Max: 98.3 (03 Nov 2023 08:51)  HR: 82 (03 Nov 2023 08:51) (77 - 82)  BP: 102/67 (03 Nov 2023 08:51) (102/67 - 111/69)  RR: 16 (03 Nov 2023 08:51) (16 - 16)  SpO2: 96% (03 Nov 2023 08:51) (96% - 96%)      PHYSICAL EXAM:  Gen -  Comfortable,   In no acute distress IL NC oxy 1L  HEENT - , nostrils are moist, no bleeding, mild icterus, non progressive  Neck - Supple, No limited ROM, O2 via NC  Pulm - good   Cardiovascular - RRR, S1S2  Chest - good chest expansion,   Abdomen - Soft, non tender, +BS,   Extremities - No Cyanosis, no clubbing, leg edema b/l feet,, non pitting,    no calf tenderness, LUE Med line    Neuro-     Cognitive - awake, alert, fully oriented, engaging, speech normal      Motor -                    LEFT    UE - 4/5                    RIGHT UE - 4/5                     LEFT    LE - 3+-/5, distally and 2/5 proximal,                    RIGHT LE - 3+-/5  limited by leg edema,, which is reducing      Sensory - Intact        Reflexes - DTR 1+      Coordination - FTN  impaired  due to weakness, but improving   MSK: soreness and weakness  Psychiatric - Mood stable, Affect WNL  Skin: Left lower abdomen ruptured blister 3.0 x 1.0, stage 2 pressure injury to right buttock 4 x1 and left buttock 3x1, stage 2 pressure injury ti right inner thigh 0.2 x 0.2, right groin wound 10.5 x 2.0, Left groin wound 5 x 5,    Hand swelling reducing  LE edema is limited to dorsum    MMT--Able to contact B/L upper back muscles,   Elbow abduction and adduction, gravity eliminated 2+      LABS:                        10.0   12.15 )-----------( 344      ( 02 Nov 2023 15:40 )             32.5     11-02    136  |  101  |  22  ----------------------------<  205<H>  3.9   |  28  |  0.70    Ca    9.0      02 Nov 2023 15:40    TPro  5.1<L>  /  Alb  2.4<L>  /  TBili  1.3<H>  /  DBili  x   /  AST  68<H>  /  ALT  114<H>  /  AlkPhos  338<H>  11-02      Urinalysis Basic - ( 02 Nov 2023 15:40 )    Color: x / Appearance: x / SG: x / pH: x  Gluc: 205 mg/dL / Ketone: x  / Bili: x / Urobili: x   Blood: x / Protein: x / Nitrite: x   Leuk Esterase: x / RBC: x / WBC x   Sq Epi: x / Non Sq Epi: x / Bacteria: x      CAPILLARY BLOOD GLUCOSE        MEDICATIONS  (STANDING):  ammonium lactate 12% Lotion 1 Application(s) Topical every 12 hours  apixaban 5 milliGRAM(s) Oral every 12 hours  artificial  tears Solution 1 Drop(s) Both EYES every 12 hours  ascorbic acid 500 milliGRAM(s) Oral daily  atovaquone  Suspension 1500 milliGRAM(s) Oral daily  bisacodyl 5 milliGRAM(s) Oral <User Schedule>  dextrose 5%. 1000 milliLiter(s) (100 mL/Hr) IV Continuous <Continuous>  dextrose 5%. 1000 milliLiter(s) (50 mL/Hr) IV Continuous <Continuous>  dextrose 50% Injectable 12.5 Gram(s) IV Push once  dextrose 50% Injectable 25 Gram(s) IV Push once  dextrose 50% Injectable 25 Gram(s) IV Push once  folic acid 1 milliGRAM(s) Oral daily  gabapentin 300 milliGRAM(s) Oral two times a day  glucagon  Injectable 1 milliGRAM(s) IntraMuscular once  hydrocortisone hemorrhoidal Suppository 1 Suppository(s) Rectal two times a day  influenza   Vaccine 0.5 milliLiter(s) IntraMuscular once  lactobacillus acidophilus 1 Tablet(s) Oral two times a day with meals  melatonin 6 milliGRAM(s) Oral at bedtime  methylPREDNISolone   Oral   methylPREDNISolone 56 milliGRAM(s) Oral daily  metoprolol tartrate 25 milliGRAM(s) Oral two times a day  multivitamin 1 Tablet(s) Oral daily  nystatin Powder 1 Application(s) Topical two times a day  pantoprazole    Tablet 40 milliGRAM(s) Oral before breakfast  psyllium Powder 1 Packet(s) Oral daily  sodium chloride 0.9% lock flush 5 milliLiter(s) IV Push two times a day  zinc oxide 40% Paste 1 Application(s) Topical three times a day    MEDICATIONS  (PRN):  albuterol/ipratropium for Nebulization 3 milliLiter(s) Nebulizer every 6 hours PRN Shortness of Breath and/or Wheezing  ALPRAZolam 0.25 milliGRAM(s) Oral every 6 hours PRN Anxiety  aluminum hydroxide/magnesium hydroxide/simethicone Suspension 30 milliLiter(s) Oral every 4 hours PRN Dyspepsia  dextrose Oral Gel 15 Gram(s) Oral once PRN Blood Glucose LESS THAN 70 milliGRAM(s)/deciliter  loperamide 2 milliGRAM(s) Oral three times a day PRN Diarrhea  polyethylene glycol 3350 17 Gram(s) Oral daily PRN Constipation  SIMETHICONE SOFTGELS 250 milliGRAM(s) 250 milliGRAM(s) Oral three times a day PRN for gas  sodium chloride 0.65% Nasal 1 Spray(s) Both Nostrils every 8 hours PRN Nasal Congestion

## 2023-11-03 NOTE — PROGRESS NOTE ADULT - ASSESSMENT
Assessment/Plan:  ALIZA FOLEY is a 62 year old female with PMH of pAFIB and sciatica; who presented to [facility] ED with SOB, and was found to have groundglass opacities and interstitial lung disease. She was treated with empiric Vancomycin and Zosyn. She underwent a bronchoscopy with bronchoalveolar lavage and pulse steroids. She was given IV diuretics for increased pulmonary congestion, but her respiratory status continued to worsen.  On 9/13, she was transferred to [facility] for ECMO requirements. She was further treated with Plasmapheresis, Rituximab and high-dose steroids, with significant improvement. Her overall hospital course was complicated by thrombocytopenia (s/p several platelet transfusions), leukocytosis (infectious workup entirely negative- s/p Vanco and Ceftriaxone), AFIB with RVR (resolved with Metoprolol), dysphagia (requiring NGT), transaminitis (secondary to acute illness and hypotension),  non-occlusive RIJ DVT (started on Lovenox). Patient now admitted for a multidisciplinary rehab program. 10-05-23 @ 13:18    * UA for urinary frequency  * Wound care following  * Therapy plan--continue tapering oxy, Muscle strengthening, ADLs   * Pulmonary team following - continue steroid taper- Plan to wean off 02 as tolerated- Plan for CT Chest on 11/11  * DC planning as already discussed, staring with appeal for extension of acute rehab stay duration (SW sending notes to insurance) and other options depending on the response    #Interstitial Lung Disease and Mixed connective tissue disease  - Gait Instability, ADL impairments and Functional impairments: start Comprehensive Rehab Program of PT/OT/SLP - 3 hours a day, 5 days a week  - P&O as needed   - Non-specific Interstitial Lung Disease  - Requiring ECMO   - Improvement s/p Plasmapheresis, Rituximab and high- dose steroids   - Albuterol/Ipratropium Nebulizer every 6 hours  - Mepron 1500mg daily  - PO Medrol ( due to liver dysfunction): Plan will be to taper by ~20% every 2 weeks  Medrol   64mg x 2 weeks   56mg x 2 weeks  44mg x 2 weeks   36mg x 2 weeks - Follow up Outpatient   -- Repeat CT Chest in 6 weeks   --Pulmonary team--f/u with patient and clarify when she can get immunizations    -Perform BL elbow flexion & elbow extension with antigravity in therapy  - Noted to have missed 5 days between steroid taper dosing- resumed at 56mg on 10/26- pulm recommends to continue as originally instructed   - Plan to wean 02 as tolerated   - Repeat CT Chest on 11/11     10/24--SW will apply to insurance for extension in few days and proceed with other options depending on response  F/U to earlier discussion on 10/18  Extension denied on 10/30  Case management performing peer to peer     #pAFIB/Rt Ij thrombus  - Metoprolol 25mg BID and lovenox  --- Eliquis 5mg BID - tolerating well     # Chronic Tinnitus   - ENT review and recs appreciate, Patient already made ENT appointment for f/u    # Lymphedema both legs -chronic and Rt IJ thrombus  - ACE Wrap BL LEs  - BL LE doppler negative for DVT  - BL UE doppler with chronic thrombotic changes in RIJ     #Transaminitis --LFT Down trending   - Secondary to acute illness and hypotension at [facility]  - Outpatient follow up     #Neuropathy  - Gabapentin 300 mg BID  --Work on motor strengthening, priority for UE as preferred by patient   - Check B12 level     #Sleep/Mood  - Melatonin 6mg at HS  - Psych rec Xanax 0.25mg PRN    #Skin  - Skin: Left lower abdomen ruptured blister 3.0 x 1.0, stage 2 pressure injury to right buttock 4 x1 and left buttock 3x1, stage 2 pressure injury ti right inner thigh 0.2 x 0.2, right groin wound 10.5 x 2.0, Left groin wound 5 x 5,  right neck scab wound  -- MAD to skin folds- Nystatin to submammary and abdominal pannus BID  -- MAD to L groin- cleanse with NS, Triad cream daily  -- Mad to R groin- Nystatin powder daily   -- R groin wound-- aquacel packing, Triad to periwound, Allevyn foam- daily and PRN   - Pressure injury/Skin: OOB to Chair, PT/OT   - Offload pressure, low air loss support surfaces, T&P every 2 hours   --Wound care consult-10/9 -Multiple wounds--perineal and buttock/sacral, recs appreciated   --Suggest Continue treatments as below:   Twice daily & PRN Normal Saline Cleanse of abdominal pannus & breast folds, perineal, Left & Right inner buttock erosions.  Pat thoroughly dry.   Apply Desitin generously twice daily (& PRN) to perineal, buttocks, and left groin erosions, leave open to air.    Apply Nystatin powder to abdominal pannus and breast folds.  Suggest use of Interdry cloths in folds to 'wick' moisture.  Suggest daily Normal Saline Cleanse of right groin surgical wound, pat dry.  Apply Cavillon film barrier to intact periwound skin.  Place Aquacell strip over open area, cover with foam dressing.  - Wound care following       #Pain Mgmt   - Tylenol PRN   - Gabapentin 300mg at HS     #GI/Bowel Mgmt   - Pantoprazole  - Senna  --on hold due to recent Loose BM  - PRN: Maalox   - Stool count  -  Lactobacillus BID   -- AB XR mod stool burden on transverse colon 10/23--still has mod stool burden, but moving bowel appropriately  - S/p enema and lactulose    # Alternating bowel function --commenced probiotic, latobacilus   * Leucocytosis probably steroid related and partly due to her Mixed Connective Tissue Disease, will continue monitoring     #/Bladder Mgmt   -Spontaneously voiding   - Await UA for urinary frequency     #FEN   #Dysphagia  - Diet - Minced & Moist  [CC]    - Dysphaiga- SLP evaluation     #Health maintenance  - Folic Acid   - MVI  - Ocean nasal spray    # Difficulty with access to peripheral veins-- Blood draws from Left arm Medline     #Precautions / PROPHYLAXIS:   - Falls  - ortho: Weight bearing status: WBAT   - Lungs: Aspiration, Incentive Spirometer   - DVT PPX: Eliquis 5 mg BID   ---------------------------  *l abs 10/230--stable anemia, normal renal function and down trending LFT      Liaison with family  Patient's partner present during review, details of review d/w her, detailed explanation of therapy protocol explained and she was happy with same  10/9--Partner present during review and discussed details treatment plan with her    Liaison with providers  Consult recs form multiple specialities being implemented  Surgical consult and recs 10/9--agreed with plan for AC for Rt IJ chronic thrombus    10/10 --No response to multiple calls to patient'srivate cardiologist Dr Otto Stratton  ph     ---------------------------  IDT conference on 10/31  Social Work: Await peer to peer with CM. Provided private hire list. Lives with spouse in [place] 6 months of year. DC to apt in [place] with elevator, no steps. Supportive spouse.   SLP: --n/a  OT: Mod-max A for eating/grooming with arm support. Total A for all else. Sitting balance improving.  PT: Total ADavid Ocampo.   RT: Declining.   DME: TBD   Barriers: 02 requirements,   Functional level on DC: Total A, WC level.  TDD: 11/7 to EMIGDIO.  ---------------------------  OUTPATIENT/FOLLOW UP:    Trae Whitney  Pulmonary Disease  [address], [address] [place] [address]  Phone: [phone]  Fax: [phone]  Follow Up Time: 2 weeks    Ryanne Allen  Rheumatology  [address] [address]  Phone: [phone]  Fax: [phone]  Follow Up Time: 1 week    Kyle Pierce  Internal Medicine  [address] [place] [address], [address]  [place] [address]  Phone: [phone]  Fax: [phone]  Follow Up Time: 2 weeks    Addy Powers  Pulmonary Disease  [address] [facility], [facility] [address]  [place], [place] [address]  Phone: [phone]  Fax: [phone]  Follow Up Time:     Kwame Hawley  Critical Care Medicine  [address] [facility], [facility] [address]  [place] [address]  Phone: [phone]  Fax: [phone]  Follow Up Time:     Neena Borrego  Rheumatology  [address] [facility], [address]  [place] [address]  Phone: [phone]  Fax: [phone]  Follow Up Time:    [facility]  [address]  Scheduled Appointment: 09/13/2023  2:40 PM  ---------------------------   Assessment/Plan:  ALIZA FOLEY is a 62 year old female with PMH of pAFIB and sciatica; who presented to [facility] ED with SOB, and was found to have groundglass opacities and interstitial lung disease. She was treated with empiric Vancomycin and Zosyn. She underwent a bronchoscopy with bronchoalveolar lavage and pulse steroids. She was given IV diuretics for increased pulmonary congestion, but her respiratory status continued to worsen.  On 9/13, she was transferred to [facility] for ECMO requirements. She was further treated with Plasmapheresis, Rituximab and high-dose steroids, with significant improvement. Her overall hospital course was complicated by thrombocytopenia (s/p several platelet transfusions), leukocytosis (infectious workup entirely negative- s/p Vanco and Ceftriaxone), AFIB with RVR (resolved with Metoprolol), dysphagia (requiring NGT), transaminitis (secondary to acute illness and hypotension),  non-occlusive RIJ DVT (started on Lovenox). Patient now admitted for a multidisciplinary rehab program. 10-05-23 @ 13:18    * UA for urinary frequency  * Wound care following  * Therapy plan--continue tapering oxy, Muscle strengthening, ADLs   * Pulmonary team following - continue steroid taper- Plan to wean off 02 as tolerated- Plan for CT Chest on 11/11  * DC planning as already discussed, staring with appeal for extension of acute rehab stay duration (SW sending notes to insurance) and other options depending on the response    #Interstitial Lung Disease and Mixed connective tissue disease  - Gait Instability, ADL impairments and Functional impairments: start Comprehensive Rehab Program of PT/OT/SLP - 3 hours a day, 5 days a week  - P&O as needed   - Non-specific Interstitial Lung Disease  - Requiring ECMO   - Improvement s/p Plasmapheresis, Rituximab and high- dose steroids   - Albuterol/Ipratropium Nebulizer every 6 hours  - Mepron 1500mg daily  - PO Medrol ( due to liver dysfunction): Plan will be to taper by ~20% every 2 weeks  Medrol   64mg x 2 weeks   56mg x 2 weeks  44mg x 2 weeks   36mg x 2 weeks - Follow up Outpatient   -- Repeat CT Chest in 6 weeks   --Pulmonary team--f/u with patient and clarify when she can get immunizations    -Perform BL elbow flexion & elbow extension with antigravity in therapy  - Noted to have missed 5 days between steroid taper dosing- resumed at 56mg on 10/26- pulm recommends to continue as originally instructed   - Plan to wean 02 as tolerated   - Repeat CT Chest on 11/11     10/24--SW will apply to insurance for extension in few days and proceed with other options depending on response  F/U to earlier discussion on 10/18  Extension denied on 10/30  Case management performing peer to peer     #pAFIB/Rt Ij thrombus  - Metoprolol 25mg BID and lovenox  --- Eliquis 5mg BID - tolerating well     # Chronic Tinnitus   - ENT review and recs appreciate, Patient already made ENT appointment for f/u    # Lymphedema both legs -chronic and Rt IJ thrombus  - ACE Wrap BL LEs  - BL LE doppler negative for DVT  - BL UE doppler with chronic thrombotic changes in RIJ     #Transaminitis --LFT Down trending   - Secondary to acute illness and hypotension at [facility]  - Outpatient follow up     #Neuropathy  - Gabapentin 300 mg BID  --Work on motor strengthening, priority for UE as preferred by patient   - Vit b12 >2,000 in 9/2023    #Sleep/Mood  - Melatonin 6mg at HS  - Psych rec Xanax 0.25mg PRN    #Skin  - Skin: Left lower abdomen ruptured blister 3.0 x 1.0, stage 2 pressure injury to right buttock 4 x1 and left buttock 3x1, stage 2 pressure injury ti right inner thigh 0.2 x 0.2, right groin wound 10.5 x 2.0, Left groin wound 5 x 5,  right neck scab wound  -- MAD to skin folds- Nystatin to submammary and abdominal pannus BID  -- MAD to L groin- cleanse with NS, Triad cream daily  -- Mad to R groin- Nystatin powder daily   -- R groin wound-- aquacel packing, Triad to periwound, Allevyn foam- daily and PRN   - Pressure injury/Skin: OOB to Chair, PT/OT   - Offload pressure, low air loss support surfaces, T&P every 2 hours   --Wound care consult-10/9 -Multiple wounds--perineal and buttock/sacral, recs appreciated   --Suggest Continue treatments as below:   Twice daily & PRN Normal Saline Cleanse of abdominal pannus & breast folds, perineal, Left & Right inner buttock erosions.  Pat thoroughly dry.   Apply Desitin generously twice daily (& PRN) to perineal, buttocks, and left groin erosions, leave open to air.    Apply Nystatin powder to abdominal pannus and breast folds.  Suggest use of Interdry cloths in folds to 'wick' moisture.  Suggest daily Normal Saline Cleanse of right groin surgical wound, pat dry.  Apply Cavillon film barrier to intact periwound skin.  Place Aquacell strip over open area, cover with foam dressing.  - Wound care following       #Pain Mgmt   - Tylenol PRN   - Gabapentin 300mg at HS     #GI/Bowel Mgmt   - Pantoprazole  - Senna  --on hold due to recent Loose BM  - PRN: Maalox   - Stool count  -  Lactobacillus BID   -- AB XR mod stool burden on transverse colon 10/23--still has mod stool burden, but moving bowel appropriately  - S/p enema and lactulose    # Alternating bowel function --commenced probiotic, latobacilus   * Leucocytosis probably steroid related and partly due to her Mixed Connective Tissue Disease, will continue monitoring     #/Bladder Mgmt   -Spontaneously voiding   - Await UA for urinary frequency     #FEN   #Dysphagia  - Diet - Minced & Moist  [CC]    - Dysphaiga- SLP evaluation     #Health maintenance  - Folic Acid   - MVI  - Ocean nasal spray    # Difficulty with access to peripheral veins-- Blood draws from Left arm Medline     #Precautions / PROPHYLAXIS:   - Falls  - ortho: Weight bearing status: WBAT   - Lungs: Aspiration, Incentive Spirometer   - DVT PPX: Eliquis 5 mg BID   ---------------------------  *l abs 10/30--stable anemia, normal renal function and down trending LFT      Liaison with family  Patient's partner present during review, details of review d/w her, detailed explanation of therapy protocol explained and she was happy with same  10/9--Partner present during review and discussed details treatment plan with her    Liaison with providers  Consult recs form multiple specialities being implemented  Surgical consult and recs 10/9--agreed with plan for AC for Rt IJ chronic thrombus    10/10 --No response to multiple calls to patient'srivate cardiologist Dr Otto Stratton  ph     ---------------------------  IDT conference on 10/31  Social Work: Await peer to peer with CM. Provided private hire list. Lives with spouse in [place] 6 months of year. DC to apt in [place] with elevator, no steps. Supportive spouse.   SLP: --n/a  OT: Mod-max A for eating/grooming with arm support. Total A for all else. Sitting balance improving.  PT: Total A. Damaris.   RT: Declining.   DME: TBD   Barriers: 02 requirements,   Functional level on DC: Total A, WC level.  TDD: 11/7 to EMIGDIO.  ---------------------------  OUTPATIENT/FOLLOW UP:    Trae Whitney  Pulmonary Disease  [address], [address] [place] [address]  Phone: [phone]  Fax: [phone]  Follow Up Time: 2 weeks    Ryanne Allen  Rheumatology  [address] [address]  Phone: [phone]  Fax: [phone]  Follow Up Time: 1 week    Kyle Pierce  Internal Medicine  [address] [place] [address], [address]  [place] [address]  Phone: [phone]  Fax: [phone]  Follow Up Time: 2 weeks    Addy Powers  Pulmonary Disease  [address] [facility], [facility] [address]  [place], [place] [id number]  Phone: [phone]  Fax: [phone]  Follow Up Time:     Kwame Hawley  Critical Care Medicine  [address] [facility], [address]  [place] [address]  Phone: [phone]  Fax: [phone]  Follow Up Time:     Neena Borrego  Rheumatology  [address] [place] [address], [address]  [place] [address]  Phone: [phone]  Fax: [phone]  Follow Up Time:    [facility]  [address]  Scheduled Appointment: 09/13/2023  2:40 PM  ---------------------------

## 2023-11-04 PROCEDURE — 99232 SBSQ HOSP IP/OBS MODERATE 35: CPT

## 2023-11-04 RX ADMIN — Medication 1 TABLET(S): at 11:30

## 2023-11-04 RX ADMIN — APIXABAN 5 MILLIGRAM(S): 2.5 TABLET, FILM COATED ORAL at 08:41

## 2023-11-04 RX ADMIN — Medication 500 MILLIGRAM(S): at 11:31

## 2023-11-04 RX ADMIN — GABAPENTIN 300 MILLIGRAM(S): 400 CAPSULE ORAL at 17:01

## 2023-11-04 RX ADMIN — SODIUM CHLORIDE 5 MILLILITER(S): 9 INJECTION INTRAMUSCULAR; INTRAVENOUS; SUBCUTANEOUS at 17:03

## 2023-11-04 RX ADMIN — NYSTATIN CREAM 1 APPLICATION(S): 100000 CREAM TOPICAL at 17:01

## 2023-11-04 RX ADMIN — ZINC OXIDE 1 APPLICATION(S): 200 OINTMENT TOPICAL at 14:29

## 2023-11-04 RX ADMIN — Medication 25 MILLIGRAM(S): at 08:40

## 2023-11-04 RX ADMIN — Medication 25 MILLIGRAM(S): at 20:37

## 2023-11-04 RX ADMIN — PANTOPRAZOLE SODIUM 40 MILLIGRAM(S): 20 TABLET, DELAYED RELEASE ORAL at 08:41

## 2023-11-04 RX ADMIN — Medication 56 MILLIGRAM(S): at 08:41

## 2023-11-04 RX ADMIN — APIXABAN 5 MILLIGRAM(S): 2.5 TABLET, FILM COATED ORAL at 20:37

## 2023-11-04 RX ADMIN — Medication 1 TABLET(S): at 17:01

## 2023-11-04 RX ADMIN — Medication 1 MILLIGRAM(S): at 11:30

## 2023-11-04 RX ADMIN — Medication 1 SUPPOSITORY(S): at 17:03

## 2023-11-04 RX ADMIN — Medication 6 MILLIGRAM(S): at 20:36

## 2023-11-04 RX ADMIN — Medication 1 APPLICATION(S): at 17:01

## 2023-11-04 RX ADMIN — GABAPENTIN 300 MILLIGRAM(S): 400 CAPSULE ORAL at 08:40

## 2023-11-04 RX ADMIN — Medication 5 MILLIGRAM(S): at 08:40

## 2023-11-04 RX ADMIN — SODIUM CHLORIDE 5 MILLILITER(S): 9 INJECTION INTRAMUSCULAR; INTRAVENOUS; SUBCUTANEOUS at 08:45

## 2023-11-04 RX ADMIN — Medication 1 TABLET(S): at 08:41

## 2023-11-04 RX ADMIN — Medication 1 DROP(S): at 08:43

## 2023-11-04 RX ADMIN — NYSTATIN CREAM 1 APPLICATION(S): 100000 CREAM TOPICAL at 08:43

## 2023-11-04 RX ADMIN — Medication 1 APPLICATION(S): at 08:43

## 2023-11-04 RX ADMIN — ATOVAQUONE 1500 MILLIGRAM(S): 750 SUSPENSION ORAL at 16:07

## 2023-11-04 RX ADMIN — ZINC OXIDE 1 APPLICATION(S): 200 OINTMENT TOPICAL at 20:38

## 2023-11-04 RX ADMIN — ZINC OXIDE 1 APPLICATION(S): 200 OINTMENT TOPICAL at 08:43

## 2023-11-04 NOTE — PROGRESS NOTE ADULT - SUBJECTIVE AND OBJECTIVE BOX
Cc: Gait dysfunction    HPI: Patient with no new medical issues today.  Pain controlled, no chest pain, no N/V, no Fevers/Chills. No other new ROS  Has been tolerating rehabilitation program.    MEDICATIONS  (STANDING):  ammonium lactate 12% Lotion 1 Application(s) Topical every 12 hours  apixaban 5 milliGRAM(s) Oral every 12 hours  artificial  tears Solution 1 Drop(s) Both EYES every 12 hours  ascorbic acid 500 milliGRAM(s) Oral daily  atovaquone  Suspension 1500 milliGRAM(s) Oral daily  bisacodyl 5 milliGRAM(s) Oral <User Schedule>  dextrose 5%. 1000 milliLiter(s) (50 mL/Hr) IV Continuous <Continuous>  dextrose 5%. 1000 milliLiter(s) (100 mL/Hr) IV Continuous <Continuous>  dextrose 50% Injectable 25 Gram(s) IV Push once  dextrose 50% Injectable 12.5 Gram(s) IV Push once  dextrose 50% Injectable 25 Gram(s) IV Push once  folic acid 1 milliGRAM(s) Oral daily  gabapentin 300 milliGRAM(s) Oral two times a day  glucagon  Injectable 1 milliGRAM(s) IntraMuscular once  hydrocortisone hemorrhoidal Suppository 1 Suppository(s) Rectal two times a day  influenza   Vaccine 0.5 milliLiter(s) IntraMuscular once  lactobacillus acidophilus 1 Tablet(s) Oral two times a day with meals  melatonin 6 milliGRAM(s) Oral at bedtime  methylPREDNISolone   Oral   methylPREDNISolone 56 milliGRAM(s) Oral daily  metoprolol tartrate 25 milliGRAM(s) Oral two times a day  multivitamin 1 Tablet(s) Oral daily  nystatin Powder 1 Application(s) Topical two times a day  pantoprazole    Tablet 40 milliGRAM(s) Oral before breakfast  psyllium Powder 1 Packet(s) Oral daily  sodium chloride 0.9% lock flush 5 milliLiter(s) IV Push two times a day  zinc oxide 40% Paste 1 Application(s) Topical three times a day    MEDICATIONS  (PRN):  albuterol/ipratropium for Nebulization 3 milliLiter(s) Nebulizer every 6 hours PRN Shortness of Breath and/or Wheezing  ALPRAZolam 0.25 milliGRAM(s) Oral every 6 hours PRN Anxiety  aluminum hydroxide/magnesium hydroxide/simethicone Suspension 30 milliLiter(s) Oral every 4 hours PRN Dyspepsia  dextrose Oral Gel 15 Gram(s) Oral once PRN Blood Glucose LESS THAN 70 milliGRAM(s)/deciliter  loperamide 2 milliGRAM(s) Oral three times a day PRN Diarrhea  polyethylene glycol 3350 17 Gram(s) Oral daily PRN Constipation  SIMETHICONE SOFTGELS 250 milliGRAM(s) 250 milliGRAM(s) Oral three times a day PRN for gas  sodium chloride 0.65% Nasal 1 Spray(s) Both Nostrils every 8 hours PRN Nasal Congestion      Vital Signs Last 24 Hrs  T(C): 36.8 (2023 08:39), Max: 36.9 (2023 20:12)  T(F): 98.2 (2023 08:39), Max: 98.5 (2023 20:12)  HR: 77 (2023 08:39) (77 - 94)  BP: 114/74 (2023 08:39) (110/68 - 114/74)  BP(mean): --  RR: 16 (2023 08:39) (16 - 16)  SpO2: 96% (2023 08:39) (93% - 96%)    Parameters below as of 2023 08:39  Patient On (Oxygen Delivery Method): room air  O2 Flow (L/min): 1      In NAD  HEENT- EOMI  Heart- RRR, S1S2  Lungs- CTA bl.  Abd- + BS, NT  Ext- No calf pain  Neuro- Exam unchanged                          10.0   12.15 )-----------( 344      ( 2023 15:40 )             32.5     11-    136  |  101  |  22  ----------------------------<  205<H>  3.9   |  28  |  0.70    Ca    9.0      2023 15:40    TPro  5.1<L>  /  Alb  2.4<L>  /  TBili  1.3<H>  /  DBili  x   /  AST  68<H>  /  ALT  114<H>  /  AlkPhos  338<H>  11-02    CAPILLARY BLOOD GLUCOSE        Urinalysis Basic - ( 2023 09:40 )    Color: Yellow / Appearance: Clear / S.016 / pH: x  Gluc: x / Ketone: Negative mg/dL  / Bili: Negative / Urobili: 1.0 mg/dL   Blood: x / Protein: Negative mg/dL / Nitrite: Negative   Leuk Esterase: Small / RBC: 0 /HPF / WBC 3 /HPF   Sq Epi: x / Non Sq Epi: x / Bacteria: Negative /HPF                Imp: Patient with diagnosis of          admitted for comprehensive acute rehabilitation.    Plan:  - Continue therapies  - DVT prophylaxis  - Skin- Turn q2h, check skin daily  - Continue current medications; patient medically stable.   - Patient is stable to continue current rehabilitation program.    Cc: Gait dysfunction    HPI: Patient with no new medical issues today.  Reports uneventful night, tolerating diet  Happy with continued improvement of UE function   Tolerating gabapentin increased dose  Had Podiatry review yesterday, for callus on both feet, continue conservative management     Pain controlled, no chest pain, no N/V, no Fevers/Chills.  LBM 11/3    Has been tolerating rehabilitation program.  Continues to make progress     MEDICATIONS  (STANDING):  ammonium lactate 12% Lotion 1 Application(s) Topical every 12 hours  apixaban 5 milliGRAM(s) Oral every 12 hours  artificial  tears Solution 1 Drop(s) Both EYES every 12 hours  ascorbic acid 500 milliGRAM(s) Oral daily  atovaquone  Suspension 1500 milliGRAM(s) Oral daily  bisacodyl 5 milliGRAM(s) Oral <User Schedule>  dextrose 5%. 1000 milliLiter(s) (50 mL/Hr) IV Continuous <Continuous>  dextrose 5%. 1000 milliLiter(s) (100 mL/Hr) IV Continuous <Continuous>  dextrose 50% Injectable 25 Gram(s) IV Push once  dextrose 50% Injectable 12.5 Gram(s) IV Push once  dextrose 50% Injectable 25 Gram(s) IV Push once  folic acid 1 milliGRAM(s) Oral daily  gabapentin 300 milliGRAM(s) Oral two times a day  glucagon  Injectable 1 milliGRAM(s) IntraMuscular once  hydrocortisone hemorrhoidal Suppository 1 Suppository(s) Rectal two times a day  influenza   Vaccine 0.5 milliLiter(s) IntraMuscular once  lactobacillus acidophilus 1 Tablet(s) Oral two times a day with meals  melatonin 6 milliGRAM(s) Oral at bedtime  methylPREDNISolone   Oral   methylPREDNISolone 56 milliGRAM(s) Oral daily  metoprolol tartrate 25 milliGRAM(s) Oral two times a day  multivitamin 1 Tablet(s) Oral daily  nystatin Powder 1 Application(s) Topical two times a day  pantoprazole    Tablet 40 milliGRAM(s) Oral before breakfast  psyllium Powder 1 Packet(s) Oral daily  sodium chloride 0.9% lock flush 5 milliLiter(s) IV Push two times a day  zinc oxide 40% Paste 1 Application(s) Topical three times a day    MEDICATIONS  (PRN):  albuterol/ipratropium for Nebulization 3 milliLiter(s) Nebulizer every 6 hours PRN Shortness of Breath and/or Wheezing  ALPRAZolam 0.25 milliGRAM(s) Oral every 6 hours PRN Anxiety  aluminum hydroxide/magnesium hydroxide/simethicone Suspension 30 milliLiter(s) Oral every 4 hours PRN Dyspepsia  dextrose Oral Gel 15 Gram(s) Oral once PRN Blood Glucose LESS THAN 70 milliGRAM(s)/deciliter  loperamide 2 milliGRAM(s) Oral three times a day PRN Diarrhea  polyethylene glycol 3350 17 Gram(s) Oral daily PRN Constipation  SIMETHICONE SOFTGELS 250 milliGRAM(s) 250 milliGRAM(s) Oral three times a day PRN for gas  sodium chloride 0.65% Nasal 1 Spray(s) Both Nostrils every 8 hours PRN Nasal Congestion      Vital Signs Last 24 Hrs  T(C): 36.8 (2023 08:39), Max: 36.9 (2023 20:12)  T(F): 98.2 (2023 08:39), Max: 98.5 (2023 20:12)  HR: 77 (2023 08:39) (77 - 94)  BP: 114/74 (2023 08:39) (110/68 - 114/74)  RR: 16 (2023 08:39) (16 - 16)  SpO2: 96% (2023 08:39) (93% - 96%)  Parameters below as of 2023 08:39  Patient On (Oxygen Delivery Method): room air  O2 Flow (L/min): 1    EXAM  In NAD on NC oxy 1L  HEENT- EOMI  Heart- RRR, S1S2  Lungs- Clear  Abd- + BS, non tender  Ext- No calf tenderness    Neuro- Exam AAO X 3                        10.0   12.15 )-----------( 344      ( 2023 15:40 )             32.5     11-02    136  |  101  |  22  ----------------------------<  205<H>  3.9   |  28  |  0.70    Ca    9.0      2023 15:40    TPro  5.1<L>  /  Alb  2.4<L>  /  TBili  1.3<H>  /  DBili  x   /  AST  68<H>  /  ALT  114<H>  /  AlkPhos  338<H>  11-    CAPILLARY BLOOD GLUCOSE    Urinalysis Basic - ( 2023 09:40 )    Color: Yellow / Appearance: Clear / S.016 / pH: x  Gluc: x / Ketone: Negative mg/dL  / Bili: Negative / Urobili: 1.0 mg/dL   Blood: x / Protein: Negative mg/dL / Nitrite: Negative   Leuk Esterase: Small / RBC: 0 /HPF / WBC 3 /HPF   Sq Epi: x / Non Sq Epi: x / Bacteria: Negative /HPF      Imp: Patient with diagnosis of interstitial lung disease admitted for comprehensive acute rehabilitation.    Plan:  - Continue therapies  - DVT prophylaxis/Afib/Rt IJ thrombus--c/w apixiban  - ILD--Continue tapering steroid  --Neuropathy--c/w gabapentin  - Continue current medications;   - Patient is stable to continue current rehabilitation program.

## 2023-11-05 PROCEDURE — 99232 SBSQ HOSP IP/OBS MODERATE 35: CPT

## 2023-11-05 RX ADMIN — ZINC OXIDE 1 APPLICATION(S): 200 OINTMENT TOPICAL at 11:54

## 2023-11-05 RX ADMIN — ZINC OXIDE 1 APPLICATION(S): 200 OINTMENT TOPICAL at 20:45

## 2023-11-05 RX ADMIN — Medication 1 MILLIGRAM(S): at 11:53

## 2023-11-05 RX ADMIN — Medication 1 PACKET(S): at 11:53

## 2023-11-05 RX ADMIN — NYSTATIN CREAM 1 APPLICATION(S): 100000 CREAM TOPICAL at 08:33

## 2023-11-05 RX ADMIN — Medication 56 MILLIGRAM(S): at 08:13

## 2023-11-05 RX ADMIN — ATOVAQUONE 1500 MILLIGRAM(S): 750 SUSPENSION ORAL at 11:53

## 2023-11-05 RX ADMIN — SODIUM CHLORIDE 5 MILLILITER(S): 9 INJECTION INTRAMUSCULAR; INTRAVENOUS; SUBCUTANEOUS at 17:17

## 2023-11-05 RX ADMIN — GABAPENTIN 300 MILLIGRAM(S): 400 CAPSULE ORAL at 08:15

## 2023-11-05 RX ADMIN — Medication 5 MILLIGRAM(S): at 08:13

## 2023-11-05 RX ADMIN — ZINC OXIDE 1 APPLICATION(S): 200 OINTMENT TOPICAL at 08:33

## 2023-11-05 RX ADMIN — Medication 1 TABLET(S): at 17:23

## 2023-11-05 RX ADMIN — NYSTATIN CREAM 1 APPLICATION(S): 100000 CREAM TOPICAL at 17:24

## 2023-11-05 RX ADMIN — Medication 1 DROP(S): at 20:45

## 2023-11-05 RX ADMIN — Medication 25 MILLIGRAM(S): at 08:13

## 2023-11-05 RX ADMIN — Medication 2 MILLIGRAM(S): at 21:37

## 2023-11-05 RX ADMIN — PANTOPRAZOLE SODIUM 40 MILLIGRAM(S): 20 TABLET, DELAYED RELEASE ORAL at 08:13

## 2023-11-05 RX ADMIN — GABAPENTIN 300 MILLIGRAM(S): 400 CAPSULE ORAL at 17:22

## 2023-11-05 RX ADMIN — Medication 1 TABLET(S): at 08:14

## 2023-11-05 RX ADMIN — APIXABAN 5 MILLIGRAM(S): 2.5 TABLET, FILM COATED ORAL at 08:13

## 2023-11-05 RX ADMIN — APIXABAN 5 MILLIGRAM(S): 2.5 TABLET, FILM COATED ORAL at 20:44

## 2023-11-05 RX ADMIN — Medication 25 MILLIGRAM(S): at 20:44

## 2023-11-05 RX ADMIN — Medication 500 MILLIGRAM(S): at 11:53

## 2023-11-05 RX ADMIN — Medication 1 APPLICATION(S): at 17:23

## 2023-11-05 RX ADMIN — Medication 1 TABLET(S): at 11:53

## 2023-11-05 RX ADMIN — Medication 6 MILLIGRAM(S): at 20:42

## 2023-11-05 RX ADMIN — SODIUM CHLORIDE 5 MILLILITER(S): 9 INJECTION INTRAMUSCULAR; INTRAVENOUS; SUBCUTANEOUS at 08:33

## 2023-11-05 NOTE — PROGRESS NOTE ADULT - SUBJECTIVE AND OBJECTIVE BOX
Cc: Gait dysfunction    HPI: Patient with no new medical issues today.  Pain controlled, no chest pain, no N/V, no Fevers/Chills. No other new ROS  Has been tolerating rehabilitation program.    MEDICATIONS  (STANDING):  ammonium lactate 12% Lotion 1 Application(s) Topical every 12 hours  apixaban 5 milliGRAM(s) Oral every 12 hours  artificial  tears Solution 1 Drop(s) Both EYES every 12 hours  ascorbic acid 500 milliGRAM(s) Oral daily  atovaquone  Suspension 1500 milliGRAM(s) Oral daily  bisacodyl 5 milliGRAM(s) Oral <User Schedule>  dextrose 5%. 1000 milliLiter(s) (50 mL/Hr) IV Continuous <Continuous>  dextrose 5%. 1000 milliLiter(s) (100 mL/Hr) IV Continuous <Continuous>  dextrose 50% Injectable 25 Gram(s) IV Push once  dextrose 50% Injectable 12.5 Gram(s) IV Push once  dextrose 50% Injectable 25 Gram(s) IV Push once  folic acid 1 milliGRAM(s) Oral daily  gabapentin 300 milliGRAM(s) Oral two times a day  glucagon  Injectable 1 milliGRAM(s) IntraMuscular once  hydrocortisone hemorrhoidal Suppository 1 Suppository(s) Rectal two times a day  influenza   Vaccine 0.5 milliLiter(s) IntraMuscular once  lactobacillus acidophilus 1 Tablet(s) Oral two times a day with meals  melatonin 6 milliGRAM(s) Oral at bedtime  methylPREDNISolone   Oral   methylPREDNISolone 56 milliGRAM(s) Oral daily  metoprolol tartrate 25 milliGRAM(s) Oral two times a day  multivitamin 1 Tablet(s) Oral daily  nystatin Powder 1 Application(s) Topical two times a day  pantoprazole    Tablet 40 milliGRAM(s) Oral before breakfast  psyllium Powder 1 Packet(s) Oral daily  sodium chloride 0.9% lock flush 5 milliLiter(s) IV Push two times a day  zinc oxide 40% Paste 1 Application(s) Topical three times a day    MEDICATIONS  (PRN):  albuterol/ipratropium for Nebulization 3 milliLiter(s) Nebulizer every 6 hours PRN Shortness of Breath and/or Wheezing  ALPRAZolam 0.25 milliGRAM(s) Oral every 6 hours PRN Anxiety  aluminum hydroxide/magnesium hydroxide/simethicone Suspension 30 milliLiter(s) Oral every 4 hours PRN Dyspepsia  dextrose Oral Gel 15 Gram(s) Oral once PRN Blood Glucose LESS THAN 70 milliGRAM(s)/deciliter  loperamide 2 milliGRAM(s) Oral three times a day PRN Diarrhea  polyethylene glycol 3350 17 Gram(s) Oral daily PRN Constipation  SIMETHICONE SOFTGELS 250 milliGRAM(s) 250 milliGRAM(s) Oral three times a day PRN for gas  sodium chloride 0.65% Nasal 1 Spray(s) Both Nostrils every 8 hours PRN Nasal Congestion      Vital Signs Last 24 Hrs  T(C): 36.8 (05 Nov 2023 09:32), Max: 36.8 (05 Nov 2023 09:32)  T(F): 98.2 (05 Nov 2023 09:32), Max: 98.2 (05 Nov 2023 09:32)  HR: 77 (05 Nov 2023 09:32) (76 - 77)  BP: 108/65 (05 Nov 2023 09:32) (108/65 - 109/68)  BP(mean): --  RR: 16 (05 Nov 2023 09:32) (16 - 16)  SpO2: 97% (05 Nov 2023 09:32) (97% - 97%)    Parameters below as of 05 Nov 2023 09:32  Patient On (Oxygen Delivery Method): nasal cannula  O2 Flow (L/min): 1      In NAD  HEENT- EOMI  Heart- RRR, S1S2  Lungs- CTA bl.  Abd- + BS, NT  Ext- No calf pain  Neuro- Exam unchanged          Imp: Patient with diagnosis of          admitted for comprehensive acute rehabilitation.    Plan:  - Continue therapies  - DVT prophylaxis  - Skin- Turn q2h, check skin daily  - Continue current medications; patient medically stable.   - Patient is stable to continue current rehabilitation program.    Cc: Gait dysfunction    HPI: Patient with no new medical issues today.  Slept well last night  Happy with sustained improvement of motor function   Fed herself, 100% of her meal portion,    Reports recurring perineal rash, previously responded to desitin cream, will recommence it     ROS  Pain controlled, no chest pain, no N/V, no Fevers/Chills. lbm 11/4    Has been tolerating rehabilitation program.    MEDICATIONS  (STANDING):  ammonium lactate 12% Lotion 1 Application(s) Topical every 12 hours  apixaban 5 milliGRAM(s) Oral every 12 hours  artificial  tears Solution 1 Drop(s) Both EYES every 12 hours  ascorbic acid 500 milliGRAM(s) Oral daily  atovaquone  Suspension 1500 milliGRAM(s) Oral daily  bisacodyl 5 milliGRAM(s) Oral <User Schedule>  dextrose 5%. 1000 milliLiter(s) (50 mL/Hr) IV Continuous <Continuous>  dextrose 5%. 1000 milliLiter(s) (100 mL/Hr) IV Continuous <Continuous>  dextrose 50% Injectable 25 Gram(s) IV Push once  dextrose 50% Injectable 12.5 Gram(s) IV Push once  dextrose 50% Injectable 25 Gram(s) IV Push once  folic acid 1 milliGRAM(s) Oral daily  gabapentin 300 milliGRAM(s) Oral two times a day  glucagon  Injectable 1 milliGRAM(s) IntraMuscular once  hydrocortisone hemorrhoidal Suppository 1 Suppository(s) Rectal two times a day  influenza   Vaccine 0.5 milliLiter(s) IntraMuscular once  lactobacillus acidophilus 1 Tablet(s) Oral two times a day with meals  melatonin 6 milliGRAM(s) Oral at bedtime  methylPREDNISolone   Oral   methylPREDNISolone 56 milliGRAM(s) Oral daily  metoprolol tartrate 25 milliGRAM(s) Oral two times a day  multivitamin 1 Tablet(s) Oral daily  nystatin Powder 1 Application(s) Topical two times a day  pantoprazole    Tablet 40 milliGRAM(s) Oral before breakfast  psyllium Powder 1 Packet(s) Oral daily  sodium chloride 0.9% lock flush 5 milliLiter(s) IV Push two times a day  zinc oxide 40% Paste 1 Application(s) Topical three times a day    MEDICATIONS  (PRN):  albuterol/ipratropium for Nebulization 3 milliLiter(s) Nebulizer every 6 hours PRN Shortness of Breath and/or Wheezing  ALPRAZolam 0.25 milliGRAM(s) Oral every 6 hours PRN Anxiety  aluminum hydroxide/magnesium hydroxide/simethicone Suspension 30 milliLiter(s) Oral every 4 hours PRN Dyspepsia  dextrose Oral Gel 15 Gram(s) Oral once PRN Blood Glucose LESS THAN 70 milliGRAM(s)/deciliter  loperamide 2 milliGRAM(s) Oral three times a day PRN Diarrhea  polyethylene glycol 3350 17 Gram(s) Oral daily PRN Constipation  SIMETHICONE SOFTGELS 250 milliGRAM(s) 250 milliGRAM(s) Oral three times a day PRN for gas  sodium chloride 0.65% Nasal 1 Spray(s) Both Nostrils every 8 hours PRN Nasal Congestion      Vital Signs Last 24 Hrs  T(C): 36.8 (05 Nov 2023 09:32), Max: 36.8 (05 Nov 2023 09:32)  T(F): 98.2 (05 Nov 2023 09:32), Max: 98.2 (05 Nov 2023 09:32)  HR: 77 (05 Nov 2023 09:32) (76 - 77)  BP: 108/65 (05 Nov 2023 09:32) (108/65 - 109/68)  RR: 16 (05 Nov 2023 09:32) (16 - 16)  SpO2: 97% (05 Nov 2023 09:32) (97% - 97%)    Parameters below as of 05 Nov 2023 09:32  Patient On (Oxygen Delivery Method): nasal cannula  O2 Flow (L/min): 1    EXAM  In NAD, sitting at her bed side, on NC oxy 1L  HEENT- EOMI  Heart- RRR, S1S2  Lungs- Clear   Abd- + BS, non tender   Ext- No calf tenderness  Leg edema + dependent    Neuro- Exam AAO X 3  Antigravity both legs and distal UEs    Imp: Patient with diagnosis of interstitial lung disease admitted for comprehensive acute rehabilitation.    Plan:  - Continue therapies  - DVT prophylaxis/Afib/Rt IJ thrombus--c/w apixiban  - ILD--Continue tapering steroid  --Resp failure on oxy NC 1L--continue to taper as discussed   --LE edema--continue leg elevation and therapy, SCD nightly   --Neuropathy--c/w gabapentin  - Continue current medications;   - Patient is stable to continue current rehabilitation program

## 2023-11-05 NOTE — PROGRESS NOTE ADULT - SUBJECTIVE AND OBJECTIVE BOX
Patient is a 62y old  Female who presents with a chief complaint of Interstitial Lung Disease (2023 15:47)      Subjective and overnight events:   Patient seen and examined at bedside. no complaints. no fever, chills, sob, cp, abd pain. reports function is improving, able to sit up now     ALLERGIES:  No Known Allergies    MEDICATIONS  (STANDING):  ammonium lactate 12% Lotion 1 Application(s) Topical every 12 hours  apixaban 5 milliGRAM(s) Oral every 12 hours  artificial  tears Solution 1 Drop(s) Both EYES every 12 hours  ascorbic acid 500 milliGRAM(s) Oral daily  atovaquone  Suspension 1500 milliGRAM(s) Oral daily  bisacodyl 5 milliGRAM(s) Oral <User Schedule>  dextrose 5%. 1000 milliLiter(s) (50 mL/Hr) IV Continuous <Continuous>  dextrose 5%. 1000 milliLiter(s) (100 mL/Hr) IV Continuous <Continuous>  dextrose 50% Injectable 25 Gram(s) IV Push once  dextrose 50% Injectable 12.5 Gram(s) IV Push once  dextrose 50% Injectable 25 Gram(s) IV Push once  folic acid 1 milliGRAM(s) Oral daily  gabapentin 300 milliGRAM(s) Oral two times a day  glucagon  Injectable 1 milliGRAM(s) IntraMuscular once  hydrocortisone hemorrhoidal Suppository 1 Suppository(s) Rectal two times a day  influenza   Vaccine 0.5 milliLiter(s) IntraMuscular once  lactobacillus acidophilus 1 Tablet(s) Oral two times a day with meals  melatonin 6 milliGRAM(s) Oral at bedtime  methylPREDNISolone   Oral   methylPREDNISolone 56 milliGRAM(s) Oral daily  metoprolol tartrate 25 milliGRAM(s) Oral two times a day  multivitamin 1 Tablet(s) Oral daily  nystatin Powder 1 Application(s) Topical two times a day  pantoprazole    Tablet 40 milliGRAM(s) Oral before breakfast  psyllium Powder 1 Packet(s) Oral daily  sodium chloride 0.9% lock flush 5 milliLiter(s) IV Push two times a day  zinc oxide 40% Paste 1 Application(s) Topical three times a day    MEDICATIONS  (PRN):  albuterol/ipratropium for Nebulization 3 milliLiter(s) Nebulizer every 6 hours PRN Shortness of Breath and/or Wheezing  ALPRAZolam 0.25 milliGRAM(s) Oral every 6 hours PRN Anxiety  aluminum hydroxide/magnesium hydroxide/simethicone Suspension 30 milliLiter(s) Oral every 4 hours PRN Dyspepsia  dextrose Oral Gel 15 Gram(s) Oral once PRN Blood Glucose LESS THAN 70 milliGRAM(s)/deciliter  loperamide 2 milliGRAM(s) Oral three times a day PRN Diarrhea  polyethylene glycol 3350 17 Gram(s) Oral daily PRN Constipation  SIMETHICONE SOFTGELS 250 milliGRAM(s) 250 milliGRAM(s) Oral three times a day PRN for gas  sodium chloride 0.65% Nasal 1 Spray(s) Both Nostrils every 8 hours PRN Nasal Congestion    Vital Signs Last 24 Hrs  T(F): 98.2 (2023 09:32), Max: 98.2 (2023 09:32)  HR: 77 (2023 09:32) (76 - 77)  BP: 108/65 (2023 09:32) (108/65 - 109/68)  RR: 16 (2023 09:32) (16 - 16)  SpO2: 97% (2023 09:32) (97% - 97%)  I&O's Summary    PHYSICAL EXAM:  General: NAD, Awake alert  ENT: MMM  Neck: Supple, No JVD  Lungs: Clear to auscultation bilaterally  Cardio: RRR, S1/S2, No murmurs  Abdomen: Soft, Nontender, Nondistended; Bowel sounds present  Extremities: No calf tenderness, chronic lymphedema    LABS:       Chol -- LDL -- HDL -- Trig 199 mg/dL        Urinalysis Basic - ( 2023 09:40 )    Color: Yellow / Appearance: Clear / S.016 / pH: x  Gluc: x / Ketone: Negative mg/dL  / Bili: Negative / Urobili: 1.0 mg/dL   Blood: x / Protein: Negative mg/dL / Nitrite: Negative   Leuk Esterase: Small / RBC: 0 /HPF / WBC 3 /HPF   Sq Epi: x / Non Sq Epi: x / Bacteria: Negative /HPF            RADIOLOGY & ADDITIONAL TESTS:    Care Discussed with Consultants/Other Providers:

## 2023-11-05 NOTE — PROGRESS NOTE ADULT - ASSESSMENT
62 year old female with PMH of pAFIB and sciatica; who presented to [facility] ED with SOB, and was found to have groundglass opacities and interstitial lung disease. She was treated with empiric Vancomycin and Zosyn. She underwent a bronchoscopy with bronchoalveolar lavage and pulse steroids. She was given IV diuretics for increased pulmonary congestion, but her respiratory status continued to worsen.  On 9/13, she was transferred to [facility] for ECMO requirements. She was further treated with Plasmapheresis, Rituximab and high-dose steroids, with significant improvement. Her overall hospital course was complicated by thrombocytopenia (s/p several platelet transfusions), leukocytosis (infectious workup entirely negative- s/p Vanco and Ceftriaxone), AFIB with RVR (resolved with Metoprolol), dysphagia (requiring NGT), transaminitis (secondary to acute illness and hypotension),  non-occlusive RIJ DVT (started on Lovenox). Patient now admitted for a multidisciplinary rehab program- pt/ot/dvt ppx    Interstitial Lung Disease, s/p ECMO   Mixed Connective Tissue Disease  Chronic hypoxia    -s/p Plasmapheresis, Rituximab and high- dose steroids   -Albuterol/Ipratropium Nebulizer every 6 hours PRN  -Continue PPx Mepron   -O2 supplementation for O2Sat >92%, oxygen tapered to 1L NC now  -Pulmonary following   -on methylprednisolone taper     Leukocytosis   -suspect related to steroids, she has been afebrile and hemodynamically stable  -Monitor CBC    Anemia  -Likely multifactorial  -H/H stable  -Monitor CBC     pAFIB  -Continue Metoprolol   -Continue Eliquis     RIJ DVT found at ECMO cannula   -RUE duplex 10/6 showed No evidence of right upper extremity deep venous thrombosis. 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  -Continue Eliquis   -Vascular consult appreciated     Transaminitis, jaundice  -Secondary to acute illness and hypotension at [facility]  -Trend LFTs, bili-  downtrending  -Avoid hepatoxic agents   -Outpatient follow up     Chronic LE Lymphedema  -Bilateral LE duplex negative for DVT  -ACE wraps as tolerated  -Leg elevation encouraged     Prediabetes  -HbA1C 6.1 on 8/27  -Dietary/lifestyle modifications  -Repeat HbA1C with PMD on discharge     DVT PPx  -on Eliquis    GI PPx  -On PPI

## 2023-11-06 LAB
ALBUMIN SERPL ELPH-MCNC: 2.5 G/DL — LOW (ref 3.3–5)
ALBUMIN SERPL ELPH-MCNC: 2.5 G/DL — LOW (ref 3.3–5)
ALP SERPL-CCNC: 327 U/L — HIGH (ref 40–120)
ALP SERPL-CCNC: 327 U/L — HIGH (ref 40–120)
ALT FLD-CCNC: 108 U/L — HIGH (ref 10–45)
ALT FLD-CCNC: 108 U/L — HIGH (ref 10–45)
ANION GAP SERPL CALC-SCNC: 4 MMOL/L — LOW (ref 5–17)
ANION GAP SERPL CALC-SCNC: 4 MMOL/L — LOW (ref 5–17)
AST SERPL-CCNC: 50 U/L — HIGH (ref 10–40)
AST SERPL-CCNC: 50 U/L — HIGH (ref 10–40)
BASOPHILS # BLD AUTO: 0.04 K/UL — SIGNIFICANT CHANGE UP (ref 0–0.2)
BASOPHILS # BLD AUTO: 0.04 K/UL — SIGNIFICANT CHANGE UP (ref 0–0.2)
BASOPHILS NFR BLD AUTO: 0.3 % — SIGNIFICANT CHANGE UP (ref 0–2)
BASOPHILS NFR BLD AUTO: 0.3 % — SIGNIFICANT CHANGE UP (ref 0–2)
BILIRUB SERPL-MCNC: 1.3 MG/DL — HIGH (ref 0.2–1.2)
BILIRUB SERPL-MCNC: 1.3 MG/DL — HIGH (ref 0.2–1.2)
BUN SERPL-MCNC: 20 MG/DL — SIGNIFICANT CHANGE UP (ref 7–23)
BUN SERPL-MCNC: 20 MG/DL — SIGNIFICANT CHANGE UP (ref 7–23)
CALCIUM SERPL-MCNC: 9.3 MG/DL — SIGNIFICANT CHANGE UP (ref 8.4–10.5)
CALCIUM SERPL-MCNC: 9.3 MG/DL — SIGNIFICANT CHANGE UP (ref 8.4–10.5)
CHLORIDE SERPL-SCNC: 101 MMOL/L — SIGNIFICANT CHANGE UP (ref 96–108)
CHLORIDE SERPL-SCNC: 101 MMOL/L — SIGNIFICANT CHANGE UP (ref 96–108)
CO2 SERPL-SCNC: 31 MMOL/L — SIGNIFICANT CHANGE UP (ref 22–31)
CO2 SERPL-SCNC: 31 MMOL/L — SIGNIFICANT CHANGE UP (ref 22–31)
CREAT SERPL-MCNC: 0.46 MG/DL — LOW (ref 0.5–1.3)
CREAT SERPL-MCNC: 0.46 MG/DL — LOW (ref 0.5–1.3)
EGFR: 107 ML/MIN/1.73M2 — SIGNIFICANT CHANGE UP
EGFR: 107 ML/MIN/1.73M2 — SIGNIFICANT CHANGE UP
EOSINOPHIL # BLD AUTO: 0.01 K/UL — SIGNIFICANT CHANGE UP (ref 0–0.5)
EOSINOPHIL # BLD AUTO: 0.01 K/UL — SIGNIFICANT CHANGE UP (ref 0–0.5)
EOSINOPHIL NFR BLD AUTO: 0.1 % — SIGNIFICANT CHANGE UP (ref 0–6)
EOSINOPHIL NFR BLD AUTO: 0.1 % — SIGNIFICANT CHANGE UP (ref 0–6)
GLUCOSE SERPL-MCNC: 133 MG/DL — HIGH (ref 70–99)
GLUCOSE SERPL-MCNC: 133 MG/DL — HIGH (ref 70–99)
HCT VFR BLD CALC: 33.6 % — LOW (ref 34.5–45)
HCT VFR BLD CALC: 33.6 % — LOW (ref 34.5–45)
HGB BLD-MCNC: 10.5 G/DL — LOW (ref 11.5–15.5)
HGB BLD-MCNC: 10.5 G/DL — LOW (ref 11.5–15.5)
IMM GRANULOCYTES NFR BLD AUTO: 1.8 % — HIGH (ref 0–0.9)
IMM GRANULOCYTES NFR BLD AUTO: 1.8 % — HIGH (ref 0–0.9)
LYMPHOCYTES # BLD AUTO: 0.82 K/UL — LOW (ref 1–3.3)
LYMPHOCYTES # BLD AUTO: 0.82 K/UL — LOW (ref 1–3.3)
LYMPHOCYTES # BLD AUTO: 5.8 % — LOW (ref 13–44)
LYMPHOCYTES # BLD AUTO: 5.8 % — LOW (ref 13–44)
MCHC RBC-ENTMCNC: 30 PG — SIGNIFICANT CHANGE UP (ref 27–34)
MCHC RBC-ENTMCNC: 30 PG — SIGNIFICANT CHANGE UP (ref 27–34)
MCHC RBC-ENTMCNC: 31.3 GM/DL — LOW (ref 32–36)
MCHC RBC-ENTMCNC: 31.3 GM/DL — LOW (ref 32–36)
MCV RBC AUTO: 96 FL — SIGNIFICANT CHANGE UP (ref 80–100)
MCV RBC AUTO: 96 FL — SIGNIFICANT CHANGE UP (ref 80–100)
MONOCYTES # BLD AUTO: 1.15 K/UL — HIGH (ref 0–0.9)
MONOCYTES # BLD AUTO: 1.15 K/UL — HIGH (ref 0–0.9)
MONOCYTES NFR BLD AUTO: 8.1 % — SIGNIFICANT CHANGE UP (ref 2–14)
MONOCYTES NFR BLD AUTO: 8.1 % — SIGNIFICANT CHANGE UP (ref 2–14)
NEUTROPHILS # BLD AUTO: 11.93 K/UL — HIGH (ref 1.8–7.4)
NEUTROPHILS # BLD AUTO: 11.93 K/UL — HIGH (ref 1.8–7.4)
NEUTROPHILS NFR BLD AUTO: 83.9 % — HIGH (ref 43–77)
NEUTROPHILS NFR BLD AUTO: 83.9 % — HIGH (ref 43–77)
NRBC # BLD: 0 /100 WBCS — SIGNIFICANT CHANGE UP (ref 0–0)
NRBC # BLD: 0 /100 WBCS — SIGNIFICANT CHANGE UP (ref 0–0)
PLATELET # BLD AUTO: 414 K/UL — HIGH (ref 150–400)
PLATELET # BLD AUTO: 414 K/UL — HIGH (ref 150–400)
POTASSIUM SERPL-MCNC: 4.1 MMOL/L — SIGNIFICANT CHANGE UP (ref 3.5–5.3)
POTASSIUM SERPL-MCNC: 4.1 MMOL/L — SIGNIFICANT CHANGE UP (ref 3.5–5.3)
POTASSIUM SERPL-SCNC: 4.1 MMOL/L — SIGNIFICANT CHANGE UP (ref 3.5–5.3)
POTASSIUM SERPL-SCNC: 4.1 MMOL/L — SIGNIFICANT CHANGE UP (ref 3.5–5.3)
PROT SERPL-MCNC: 5.4 G/DL — LOW (ref 6–8.3)
PROT SERPL-MCNC: 5.4 G/DL — LOW (ref 6–8.3)
RBC # BLD: 3.5 M/UL — LOW (ref 3.8–5.2)
RBC # BLD: 3.5 M/UL — LOW (ref 3.8–5.2)
RBC # FLD: 15.8 % — HIGH (ref 10.3–14.5)
RBC # FLD: 15.8 % — HIGH (ref 10.3–14.5)
SODIUM SERPL-SCNC: 136 MMOL/L — SIGNIFICANT CHANGE UP (ref 135–145)
SODIUM SERPL-SCNC: 136 MMOL/L — SIGNIFICANT CHANGE UP (ref 135–145)
WBC # BLD: 14.2 K/UL — HIGH (ref 3.8–10.5)
WBC # BLD: 14.2 K/UL — HIGH (ref 3.8–10.5)
WBC # FLD AUTO: 14.2 K/UL — HIGH (ref 3.8–10.5)
WBC # FLD AUTO: 14.2 K/UL — HIGH (ref 3.8–10.5)

## 2023-11-06 PROCEDURE — 90832 PSYTX W PT 30 MINUTES: CPT

## 2023-11-06 PROCEDURE — 71045 X-RAY EXAM CHEST 1 VIEW: CPT | Mod: 26

## 2023-11-06 PROCEDURE — 99232 SBSQ HOSP IP/OBS MODERATE 35: CPT

## 2023-11-06 RX ORDER — BENZOCAINE AND MENTHOL 5; 1 G/100ML; G/100ML
1 LIQUID ORAL
Refills: 0 | Status: DISCONTINUED | OUTPATIENT
Start: 2023-11-06 | End: 2023-12-20

## 2023-11-06 RX ADMIN — ATOVAQUONE 1500 MILLIGRAM(S): 750 SUSPENSION ORAL at 15:12

## 2023-11-06 RX ADMIN — APIXABAN 5 MILLIGRAM(S): 2.5 TABLET, FILM COATED ORAL at 19:40

## 2023-11-06 RX ADMIN — Medication 56 MILLIGRAM(S): at 15:10

## 2023-11-06 RX ADMIN — PANTOPRAZOLE SODIUM 40 MILLIGRAM(S): 20 TABLET, DELAYED RELEASE ORAL at 15:11

## 2023-11-06 RX ADMIN — Medication 1 DROP(S): at 14:42

## 2023-11-06 RX ADMIN — Medication 1 TABLET(S): at 15:11

## 2023-11-06 RX ADMIN — Medication 1 MILLIGRAM(S): at 15:11

## 2023-11-06 RX ADMIN — ZINC OXIDE 1 APPLICATION(S): 200 OINTMENT TOPICAL at 14:42

## 2023-11-06 RX ADMIN — APIXABAN 5 MILLIGRAM(S): 2.5 TABLET, FILM COATED ORAL at 15:13

## 2023-11-06 RX ADMIN — GABAPENTIN 300 MILLIGRAM(S): 400 CAPSULE ORAL at 15:11

## 2023-11-06 RX ADMIN — Medication 500 MILLIGRAM(S): at 15:10

## 2023-11-06 RX ADMIN — Medication 0.25 MILLIGRAM(S): at 18:29

## 2023-11-06 RX ADMIN — SODIUM CHLORIDE 5 MILLILITER(S): 9 INJECTION INTRAMUSCULAR; INTRAVENOUS; SUBCUTANEOUS at 20:36

## 2023-11-06 RX ADMIN — SODIUM CHLORIDE 5 MILLILITER(S): 9 INJECTION INTRAMUSCULAR; INTRAVENOUS; SUBCUTANEOUS at 14:41

## 2023-11-06 RX ADMIN — Medication 25 MILLIGRAM(S): at 19:40

## 2023-11-06 NOTE — PROGRESS NOTE ADULT - SUBJECTIVE AND OBJECTIVE BOX
Time of Visit:  Patient seen and examined. pat is sitting in chair , comfortable     MEDICATIONS  (STANDING):  ammonium lactate 12% Lotion 1 Application(s) Topical every 12 hours  apixaban 5 milliGRAM(s) Oral every 12 hours  artificial  tears Solution 1 Drop(s) Both EYES every 12 hours  ascorbic acid 500 milliGRAM(s) Oral daily  atovaquone  Suspension 1500 milliGRAM(s) Oral daily  bisacodyl 5 milliGRAM(s) Oral <User Schedule>  dextrose 5%. 1000 milliLiter(s) (50 mL/Hr) IV Continuous <Continuous>  dextrose 5%. 1000 milliLiter(s) (100 mL/Hr) IV Continuous <Continuous>  dextrose 50% Injectable 25 Gram(s) IV Push once  dextrose 50% Injectable 12.5 Gram(s) IV Push once  dextrose 50% Injectable 25 Gram(s) IV Push once  folic acid 1 milliGRAM(s) Oral daily  gabapentin 300 milliGRAM(s) Oral two times a day  glucagon  Injectable 1 milliGRAM(s) IntraMuscular once  hydrocortisone hemorrhoidal Suppository 1 Suppository(s) Rectal two times a day  influenza   Vaccine 0.5 milliLiter(s) IntraMuscular once  lactobacillus acidophilus 1 Tablet(s) Oral two times a day with meals  melatonin 6 milliGRAM(s) Oral at bedtime  methylPREDNISolone   Oral   methylPREDNISolone 56 milliGRAM(s) Oral daily  metoprolol tartrate 25 milliGRAM(s) Oral two times a day  multivitamin 1 Tablet(s) Oral daily  nystatin Powder 1 Application(s) Topical two times a day  pantoprazole    Tablet 40 milliGRAM(s) Oral before breakfast  psyllium Powder 1 Packet(s) Oral daily  sodium chloride 0.9% lock flush 5 milliLiter(s) IV Push two times a day  zinc oxide 40% Paste 1 Application(s) Topical three times a day      MEDICATIONS  (PRN):  albuterol/ipratropium for Nebulization 3 milliLiter(s) Nebulizer every 6 hours PRN Shortness of Breath and/or Wheezing  ALPRAZolam 0.25 milliGRAM(s) Oral every 6 hours PRN Anxiety  aluminum hydroxide/magnesium hydroxide/simethicone Suspension 30 milliLiter(s) Oral every 4 hours PRN Dyspepsia  dextrose Oral Gel 15 Gram(s) Oral once PRN Blood Glucose LESS THAN 70 milliGRAM(s)/deciliter  loperamide 2 milliGRAM(s) Oral three times a day PRN Diarrhea  polyethylene glycol 3350 17 Gram(s) Oral daily PRN Constipation  SIMETHICONE SOFTGELS 250 milliGRAM(s) 250 milliGRAM(s) Oral three times a day PRN for gas  sodium chloride 0.65% Nasal 1 Spray(s) Both Nostrils every 8 hours PRN Nasal Congestion       Medications up to date at time of exam.      PHYSICAL EXAMINATION:  Patient has no new complaints.  GENERAL: The patient is a well-developed, well-nourished, in no apparent distress.     Vital Signs Last 24 Hrs  T(C): 36.8 (06 Nov 2023 14:51), Max: 36.9 (05 Nov 2023 20:39)  T(F): 98.2 (06 Nov 2023 14:51), Max: 98.5 (05 Nov 2023 20:39)  HR: 87 (06 Nov 2023 14:51) (79 - 87)  BP: 100/73 (06 Nov 2023 14:51) (100/73 - 122/67)  BP(mean): --  RR: 16 (06 Nov 2023 14:51) (16 - 16)  SpO2: 95% (06 Nov 2023 14:51) (95% - 95%)    Parameters below as of 06 Nov 2023 14:51  Patient On (Oxygen Delivery Method): room air       (if applicable)    Chest Tube (if applicable)    HEENT: Head is normocephalic and atraumatic. Extraocular muscles are intact. Mucous membranes are moist.     NECK: Supple, no palpable adenopathy.    LUNGS: Clear to auscultation, no wheezing, rales, or rhonchi.    HEART: Regular rate and rhythm without murmur.    ABDOMEN: Soft, nontender, and nondistended.  No hepatosplenomegaly is noted.    : No painful voiding, no pelvic pain    EXTREMITIES: Without any cyanosis, clubbing, rash, lesions or edema.    NEUROLOGIC: Awake, alert, motor 3+ /5 x 4     SKIN: Warm, dry, good turgor.      LABS:                        10.5   14.20 )-----------( 414      ( 06 Nov 2023 10:55 )             33.6     11-06    136  |  101  |  20  ----------------------------<  133<H>  4.1   |  31  |  0.46<L>    Ca    9.3      06 Nov 2023 10:55    TPro  5.4<L>  /  Alb  2.5<L>  /  TBili  1.3<H>  /  DBili  x   /  AST  50<H>  /  ALT  108<H>  /  AlkPhos  327<H>  11-06      Urinalysis Basic - ( 06 Nov 2023 10:55 )    Color: x / Appearance: x / SG: x / pH: x  Gluc: 133 mg/dL / Ketone: x  / Bili: x / Urobili: x   Blood: x / Protein: x / Nitrite: x   Leuk Esterase: x / RBC: x / WBC x   Sq Epi: x / Non Sq Epi: x / Bacteria: x                      MICROBIOLOGY: (if applicable)    RADIOLOGY & ADDITIONAL STUDIES:  EKG:   CXR:  ECHO:    IMPRESSION: 62y Female PAST MEDICAL & SURGICAL HISTORY:  Afib      Sciatica      Interstitial lung disease      Lymphedema       p/w             IMP: This is a 62 yr old woman  PMH A-Fib with 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 S/p pulse steroids, PLEX (9/15, 9/18, 9/20) and Rituximab (9/16 & 10/3). Course c/b severe leukopenia s/p filgastrim, shock liver with slow to resolved hyperbilirubinemia, RIJ DVT, oropharyngeal dysphagia, and recurrent SVT. Repeat CT chest on 9/30 with markedly improved bilateral groundglass opacities with resolved lung consolidations. Now in acute rehab on 4 lpm NC oxygen and tapering dose of medrol.     Problem List:    - Acute Hypoxic Resp failure   -  Interstitial lung disease   -  Mixed connective tissue disease   -  Acute hypoxia  -  RIJ DVT     Plan:  - Stable respiratory status,Room air now   - Cont. Methylprednisolone PO, taper as per rheumatology recs. from [facility]   - Continue atovaquone for PCP prophylaxis  - S/p Rituximab 9/16 and 10/3  - S/p PLEX during MICU stay  - Repeat CT scan in 6 weeks ~ November 11  - No evidence of active infection, monitor off antibiotics  - Cont. anticoagulation for DVT associated with ECMO cannula  - Consider incentive spirometry   - Care per primary team  - Discussed with spouse at bedside   - PT OOB as tolerated  - Outpatient pulmonary and rheumatology follow up upon discharge      spoke with partner at bedside

## 2023-11-06 NOTE — PROGRESS NOTE ADULT - ASSESSMENT
Patient is seated in her recliner at bedside. She is alert and oriented. Mood/affect normal. She indicates recurring buttock/sacral pain and medical team is aware. Themes discussed include coping with present situation, maintaining positive outlook, and setting realistic expectations.        Cognitive-behavioral approach used to address interaction between thoughts and feelings. Support and encouragement are provided.     Plan: Individual psychotherapy to address adjustment. Coordination of care with PM&R. Neuropsychology remains available through discharge.

## 2023-11-06 NOTE — CHART NOTE - NSCHARTNOTEFT_GEN_A_CORE
Nutrition Follow Up Note  Source: Medical Record [X] Patient [x] Family [ ]         Diet: Consistent carbohydrate (no snacks), Glucerna daily  Pt tolerating diet with good PO intake, eating >75% of meals per nursing flow sheets. Discussed adequate protein intake to promote wound healing. Pt reports good acceptance of Glucerna supplement (provides 220 kcal, 10 g protein). Pt had multiple bowel movements yesterday, took immodium today. Denies diarrhea today.     Enteral/Parenteral Nutrition: N/A    Current Weight: 256.6 lbs (10-5)    Pertinent Medications: MEDICATIONS  (STANDING):  ammonium lactate 12% Lotion 1 Application(s) Topical every 12 hours  apixaban 5 milliGRAM(s) Oral every 12 hours  artificial  tears Solution 1 Drop(s) Both EYES every 12 hours  ascorbic acid 500 milliGRAM(s) Oral daily  atovaquone  Suspension 1500 milliGRAM(s) Oral daily  bisacodyl 5 milliGRAM(s) Oral <User Schedule>  dextrose 5%. 1000 milliLiter(s) (50 mL/Hr) IV Continuous <Continuous>  dextrose 5%. 1000 milliLiter(s) (100 mL/Hr) IV Continuous <Continuous>  dextrose 50% Injectable 25 Gram(s) IV Push once  dextrose 50% Injectable 12.5 Gram(s) IV Push once  dextrose 50% Injectable 25 Gram(s) IV Push once  folic acid 1 milliGRAM(s) Oral daily  gabapentin 300 milliGRAM(s) Oral two times a day  glucagon  Injectable 1 milliGRAM(s) IntraMuscular once  hydrocortisone hemorrhoidal Suppository 1 Suppository(s) Rectal two times a day  influenza   Vaccine 0.5 milliLiter(s) IntraMuscular once  lactobacillus acidophilus 1 Tablet(s) Oral two times a day with meals  melatonin 6 milliGRAM(s) Oral at bedtime  methylPREDNISolone   Oral   methylPREDNISolone 56 milliGRAM(s) Oral daily  metoprolol tartrate 25 milliGRAM(s) Oral two times a day  multivitamin 1 Tablet(s) Oral daily  nystatin Powder 1 Application(s) Topical two times a day  pantoprazole    Tablet 40 milliGRAM(s) Oral before breakfast  psyllium Powder 1 Packet(s) Oral daily  sodium chloride 0.9% lock flush 5 milliLiter(s) IV Push two times a day  zinc oxide 40% Paste 1 Application(s) Topical three times a day    MEDICATIONS  (PRN):  albuterol/ipratropium for Nebulization 3 milliLiter(s) Nebulizer every 6 hours PRN Shortness of Breath and/or Wheezing  ALPRAZolam 0.25 milliGRAM(s) Oral every 6 hours PRN Anxiety  aluminum hydroxide/magnesium hydroxide/simethicone Suspension 30 milliLiter(s) Oral every 4 hours PRN Dyspepsia  dextrose Oral Gel 15 Gram(s) Oral once PRN Blood Glucose LESS THAN 70 milliGRAM(s)/deciliter  loperamide 2 milliGRAM(s) Oral three times a day PRN Diarrhea  polyethylene glycol 3350 17 Gram(s) Oral daily PRN Constipation  SIMETHICONE SOFTGELS 250 milliGRAM(s) 250 milliGRAM(s) Oral three times a day PRN for gas  sodium chloride 0.65% Nasal 1 Spray(s) Both Nostrils every 8 hours PRN Nasal Congestion      Pertinent Labs:  11-06 Na136 mmol/L Glu 133 mg/dL<H> K+ 4.1 mmol/L Cr  0.46 mg/dL<L> BUN 20 mg/dL 11-06 Alb 2.5 g/dL<L>        Skin: R inner thigh stage II, R inner buttock stage II, L inner buttock stage II Per nursing flowsheets     Edema: No edema per nursing flow sheets     Last BM: on 11-5 Per nursing flowsheets     Estimated Needs:   [X] No Change since Previous Assessment  [ ] Recalculated:     Previous Nutrition Diagnosis:   1. Severe malnutrition - improved  2. Increased nutrient needs - ongoing    Nutrition Diagnosis is [X] Ongoing  - addressed w/ MVI, vitamin C , Glucerna for wound healing     New Nutrition Diagnosis: [X] Not Applicable  [ ] Inadequate Protein Energy Intake   [ ] Inadequate Oral Intake   [ ] Excessive Energy Intake   [ ] Increased Nutrient Needs   [ ] Obesity   [ ] Altered GI Function   [ ] Unintended Weight Loss   [ ] Food & Nutrition Related Knowledge Deficit  [ ] Limited Adherence to nutrition related recommendations   [ ] Malnutrition      Interventions:   1. Recommend continuing with current plan of care  2. Encourage PO intake  3. Obtain and honor food preferences to promote optimal macronutrient intake    Monitoring & Evaluation:   [X] Weights   [X] PO Intake   [X] Follow Up (Per Protocol)  [X] Tolerance to Diet Prescription   [X] Other: Labs     RD Remains Available.  Desire Livingston RD

## 2023-11-06 NOTE — PROGRESS NOTE ADULT - SUBJECTIVE AND OBJECTIVE BOX
Patient seen at bedside for 30-minute, supportive psychotherapy session. Her partner, Kiara, is also present and participates, with patient consent.     Patient is making progress with use of hands and is able to feed self. She is still unable to ambulate. She is concerned about next steps, as her home is not suitable to her current situation.         oral

## 2023-11-06 NOTE — PROGRESS NOTE ADULT - SUBJECTIVE AND OBJECTIVE BOX
Patient is a 62y old  Female who presents with a chief complaint of Interstitial Lung Disease (06 Nov 2023 10:00)    HPI:  Zo Hager is a 62 year old female with PMH of pAFIB and sciatica; who presented to [facility] ED with SOB. She has been experiencing chronic SOB and non-productive cough for several months and was worked up in [place] where she had a CT scan which resulted negative.  She has been evaluated by Pulmonology and Rheumatology and was ruled out for lupus and immunological disorders.  She recently went to Urgent Care due to SOB with ambulating short distances (12-15 feet), and an XR was concerning for BL LL infiltrates. While at [facility],  CXR and CTA were significant for ground glass opacities, and interstitial lung disease, respectively. She was treated with empiric Vancomycin and Zosyn. She underwent a bronchoscopy with bronchoalveolar lavage and pulse steroids. She was given IV diuretics for increased pulmonary congestion, but her respiratory status continued to worsen.  On 9/13, she was transferred to [facility] for ECMO requirements. She was further treated with Plasmapheresis, Rituximab and high-dose steroids, with significant improvement.   Her overall hospital course was complicated by thrombocytopenia (s/p several platelet transfusions), leukocytosis (infectious workup entirely negative- s/p Vanco and Ceftriaxone), AFIB with RVR (resolved with Metoprolol), dysphagia (requiring NGT), transaminitis (secondary to acute illness and hypotension),  non-occlusive RIJ DVT (started on Lovenox). PM&R was consulted and deemed her an appropriate candidate for IRF. She was medically stabilized and cleared for discharge to [facility].     PAST MEDICAL & SURGICAL HISTORY:  Afib  Sciatica  Interstitial lung disease  Lymphedema    SUBJECTIVE/ROS:  Patient seen and sitting up in recliner, family at bedside.  Reports sacral pain - unable to really move, bed mob is poor.  Not transferring to bathroom because of safety (poor trunk control).  States she goes in brief which burns when her brief isn't changed promptly. Hx of IBS, reports multiple formed stool a day - discussed medication option with possible constipation SE, does not want to tray. reports urinary frequency, no dysuria. Discussed her progress and discharge dispo, patient and family refusing EMIGDIO and know in her current state she can't go home. Patient said she tried eating breakfast on RA.  Asymptomatic at 86% - put O2 back on without attempting deep breathing exercises.  Reviewed deep breathing exercise.      PHYSICAL EXAM  62y  Vital Signs Last 24 Hrs  T(C): 36.9 (11-05-23 @ 20:39), Max: 36.9 (11-05-23 @ 20:39)  T(F): 98.5 (11-05-23 @ 20:39), Max: 98.5 (11-05-23 @ 20:39)  HR: 79 (11-05-23 @ 20:39) (79 - 79)  BP: 122/67 (11-05-23 @ 20:39) (122/67 - 122/67)  RR: 16 (11-05-23 @ 20:39) (16 - 16)  SpO2: 95% (11-05-23 @ 20:39) (95% - 95%) - on 1L NC      PHYSICAL EXAM:  Gen -  NAD, Comfortable, IL NC oxy 1L 97%  Neck - Supple, No limited ROM, O2 via NC  Pulm - Resp nonlabored  Cardiovascular - warm and well perfused, no cyanosis  Abdomen - Soft, non tender   Extremities - edema to b/l LE, no calf tenderness, LUE Med line    Neuro-     Cognitive - awake, alert, fully oriented, engaging, speech normal      Motor -                    LEFT    UE - 4/5                    RIGHT UE - 4/5                     LEFT    LE - 3+/5, distally and 2/5 proximal                    RIGHT LE - 3+/5  limited by leg edema,, which is reducing      Sensory - Intact     MSK: soreness and weakness  Psychiatric - Mood stable, Affect WNL  Skin (unable to assess while sitting in recliner): previous exam: Left lower abdomen ruptured blister 3.0 x 1.0, stage 2 pressure injury to right buttock 4 x1 and left buttock 3x1, stage 2 pressure injury ti right inner thigh 0.2 x 0.2, right groin wound 10.5 x 2.0, Left groin wound 5 x 5    MMT--Able to contract B/L upper back muscles, EF/EE 2/5    RECENT LABS:                          10.5   14.20 )-----------( 414      ( 06 Nov 2023 10:55 )             33.6     11-06    136  |  101  |  20  ----------------------------<  133<H>  4.1   |  31  |  0.46<L>    Ca    9.3      06 Nov 2023 10:55    TPro  5.4<L>  /  Alb  2.5<L>  /  TBili  1.3<H>  /  DBili  x   /  AST  50<H>  /  ALT  108<H>  /  AlkPhos  327<H>  11-06    LIVER FUNCTIONS - ( 06 Nov 2023 10:55 )  Alb: 2.5 g/dL / Pro: 5.4 g/dL / ALK PHOS: 327 U/L / ALT: 108 U/L / AST: 50 U/L / GGT: x           Allergies  No Known Allergies    MEDICATIONS  (STANDING):  ammonium lactate 12% Lotion 1 Application(s) Topical every 12 hours  apixaban 5 milliGRAM(s) Oral every 12 hours  artificial  tears Solution 1 Drop(s) Both EYES every 12 hours  ascorbic acid 500 milliGRAM(s) Oral daily  atovaquone  Suspension 1500 milliGRAM(s) Oral daily  bisacodyl 5 milliGRAM(s) Oral <User Schedule>  dextrose 5%. 1000 milliLiter(s) (50 mL/Hr) IV Continuous <Continuous>  dextrose 5%. 1000 milliLiter(s) (100 mL/Hr) IV Continuous <Continuous>  dextrose 50% Injectable 25 Gram(s) IV Push once  dextrose 50% Injectable 12.5 Gram(s) IV Push once  dextrose 50% Injectable 25 Gram(s) IV Push once  folic acid 1 milliGRAM(s) Oral daily  gabapentin 300 milliGRAM(s) Oral two times a day  glucagon  Injectable 1 milliGRAM(s) IntraMuscular once  hydrocortisone hemorrhoidal Suppository 1 Suppository(s) Rectal two times a day  influenza   Vaccine 0.5 milliLiter(s) IntraMuscular once  lactobacillus acidophilus 1 Tablet(s) Oral two times a day with meals  melatonin 6 milliGRAM(s) Oral at bedtime  methylPREDNISolone   Oral   methylPREDNISolone 56 milliGRAM(s) Oral daily  metoprolol tartrate 25 milliGRAM(s) Oral two times a day  multivitamin 1 Tablet(s) Oral daily  nystatin Powder 1 Application(s) Topical two times a day  pantoprazole    Tablet 40 milliGRAM(s) Oral before breakfast  psyllium Powder 1 Packet(s) Oral daily  sodium chloride 0.9% lock flush 5 milliLiter(s) IV Push two times a day  zinc oxide 40% Paste 1 Application(s) Topical three times a day    MEDICATIONS  (PRN):  albuterol/ipratropium for Nebulization 3 milliLiter(s) Nebulizer every 6 hours PRN Shortness of Breath and/or Wheezing  ALPRAZolam 0.25 milliGRAM(s) Oral every 6 hours PRN Anxiety  aluminum hydroxide/magnesium hydroxide/simethicone Suspension 30 milliLiter(s) Oral every 4 hours PRN Dyspepsia  dextrose Oral Gel 15 Gram(s) Oral once PRN Blood Glucose LESS THAN 70 milliGRAM(s)/deciliter  loperamide 2 milliGRAM(s) Oral three times a day PRN Diarrhea  polyethylene glycol 3350 17 Gram(s) Oral daily PRN Constipation  SIMETHICONE SOFTGELS 250 milliGRAM(s) 250 milliGRAM(s) Oral three times a day PRN for gas  sodium chloride 0.65% Nasal 1 Spray(s) Both Nostrils every 8 hours PRN Nasal Congestion

## 2023-11-06 NOTE — PROGRESS NOTE ADULT - ASSESSMENT
Assessment/Plan:  ALIZA FOLEY is a 62 year old female with PMH of pAFIB and sciatica; who presented to [facility] ED with SOB, and was found to have groundglass opacities and interstitial lung disease. She was treated with empiric Vancomycin and Zosyn. She underwent a bronchoscopy with bronchoalveolar lavage and pulse steroids. She was given IV diuretics for increased pulmonary congestion, but her respiratory status continued to worsen.  On 9/13, she was transferred to [facility] for ECMO requirements. She was further treated with Plasmapheresis, Rituximab and high-dose steroids, with significant improvement. Her overall hospital course was complicated by thrombocytopenia (s/p several platelet transfusions), leukocytosis (infectious workup entirely negative- s/p Vanco and Ceftriaxone), AFIB with RVR (resolved with Metoprolol), dysphagia (requiring NGT), transaminitis (secondary to acute illness and hypotension),  non-occlusive RIJ DVT (started on Lovenox). Patient now admitted for a multidisciplinary rehab program. 10-05-23 @ 13:18    #Interstitial Lung Disease and Mixed connective tissue disease  - Gait Instability, ADL impairments and Functional impairments: start Comprehensive Rehab Program of PT/OT/SLP - 3 hours a day, 5 days a week  - P&O as needed   - Non-specific Interstitial Lung Disease  - Requiring ECMO   - Improvement s/p Plasmapheresis, Rituximab and high- dose steroids   - Albuterol/Ipratropium Nebulizer every 6 hours  - Mepron 1500mg daily  - PO Medrol ( due to liver dysfunction): Plan will be to taper by ~20% every 2 weeks  Medrol   64mg x 2 weeks   56mg x 2 weeks  44mg x 2 weeks   36mg x 2 weeks - Follow up Outpatient   -- Repeat CT Chest in 6 weeks   --Pulmonary team--f/u with patient and clarify when she can get immunizations    -Perform BL elbow flexion & elbow extension with antigravity in therapy  - Noted to have missed 5 days between steroid taper dosing- resumed at 56mg on 10/26- pulm recommends to continue as originally instructed   - Plan to wean 02 as tolerated   - Repeat CT Chest on 11/11     #pAFIB/Rt Ij thrombus  - Metoprolol 25mg BID and lovenox  --- Eliquis 5mg BID - tolerating well     # Chronic Tinnitus   - ENT review and recs appreciate, Patient already made ENT appointment for f/u    # Lymphedema both legs -chronic and Rt IJ thrombus  - ACE Wrap BL LEs  - BL LE doppler negative for DVT  - BL UE doppler with chronic thrombotic changes in RIJ     #Transaminitis --LFT Down trending   - Secondary to acute illness and hypotension at [facility]  - Outpatient follow up     #Neuropathy  - Gabapentin 300 mg BID  --Work on motor strengthening, priority for UE as preferred by patient   - Vit b12 >2,000 in 9/2023    #Sleep/Mood  - Melatonin 6mg at HS  - Psych rec Xanax 0.25mg PRN    #Skin  - Skin: Left lower abdomen ruptured blister 3.0 x 1.0, stage 2 pressure injury to right buttock 4 x1 and left buttock 3x1, stage 2 pressure injury ti right inner thigh 0.2 x 0.2, right groin wound 10.5 x 2.0, Left groin wound 5 x 5,  right neck scab wound  -- MAD to skin folds- Nystatin to submammary and abdominal pannus BID  -- MAD to L groin- cleanse with NS, Triad cream daily  -- Mad to R groin- Nystatin powder daily   -- R groin wound-- aquacel packing, Triad to periwound, Allevyn foam- daily and PRN   - Pressure injury/Skin: OOB to Chair, PT/OT   - Offload pressure, low air loss support surfaces, T&P every 2 hours   --Wound care consult-10/9 -Multiple wounds--perineal and buttock/sacral, recs appreciated   --Suggest Continue treatments as below:   Twice daily & PRN Normal Saline Cleanse of abdominal pannus & breast folds, perineal, Left & Right inner buttock erosions.  Pat thoroughly dry.   Apply Desitin generously twice daily (& PRN) to perineal, buttocks, and left groin erosions, leave open to air.    Apply Nystatin powder to abdominal pannus and breast folds.  Suggest use of Interdry cloths in folds to 'wick' moisture.  Suggest daily Normal Saline Cleanse of right groin surgical wound, pat dry.  Apply Cavillon film barrier to intact periwound skin.  Place Aquacell strip over open area, cover with foam dressing.  - Wound care following     #Pain Mgmt   - Tylenol PRN   - Gabapentin 300mg at HS     #GI/Bowel Mgmt   - Pantoprazole  - Senna  --on hold due to recent Loose BM  - PRN: Maalox   - Stool count  -  Lactobacillus BID   -- AB XR mod stool burden on transverse colon 10/23--still has mod stool burden, but moving bowel appropriately  - S/p enema and lactulose    # Alternating bowel function --commenced probiotic, lactobacillus   * Leucocytosis probably steroid related and partly due to her Mixed Connective Tissue Disease, will continue monitoring     #/Bladder Mgmt   -Spontaneously voiding   - UA (11/3) negative    #FEN   #Dysphagia  - Diet - Minced & Moist  [CC]    - Dysphaiga- SLP evaluation     #Health maintenance  - Folic Acid   - MVI  - Ocean nasal spray    # Difficulty with access to peripheral veins-- Blood draws from Left arm Medline     #Precautions / PROPHYLAXIS:   - Falls  - ortho: Weight bearing status: WBAT   - Lungs: Aspiration, Incentive Spirometer   - DVT PPX: Eliquis 5 mg BID   ---------------------------  IDT conference on 10/31  Social Work: Await peer to peer with CM. Provided private hire list. Lives with spouse in [place] 6 months of year. DC to apt in [place] with elevator, no steps. Supportive spouse.   SLP: --n/a  OT: Mod-max A for eating/grooming with arm support. Total A for all else. Sitting balance improving.  PT: Total ADavid Ocampo.   RT: Declining.   DME: TBD   Barriers: 02 requirements,   Functional level on DC: Total A, WC level.  TDD: 11/7 to EMIGDIO.  ---------------------------  OUTPATIENT/FOLLOW UP:    Trae Whitney  Pulmonary Disease  [address] [place] [address], [address] [place] [address]  Phone: [phone]  Fax: [phone]  Follow Up Time: 2 weeks    Ryanne Allen  Rheumatology  [address] [address]  Phone: [phone]  Fax: [phone]  Follow Up Time: 1 week    Kyle Pierce  Internal Medicine  [facility] [place] [address], [address]  [place] [address]  Phone: [phone]  Fax: [phone]  Follow Up Time: 2 weeks    Nba Addyobdulio Harley  Pulmonary Disease  [address] [facility] [address]  [place], [place] [id number]  Phone: [phone]  Fax: [phone]  Follow Up Time:     Kwame Hawley  Critical Care Medicine  [address] [facility] [address]  [place] [address]  Phone: [phone]  Fax: [phone]  Follow Up Time:     Neena Borrego  Rheumatology  [address] [place] [address], [address]  [place] [address]  Phone: [phone]  Fax: [phone]  Follow Up Time:    [facility]  [facility] [address]  Scheduled Appointment: 09/13/2023  2:40 PM  ---------------------------

## 2023-11-07 DIAGNOSIS — R09.82 POSTNASAL DRIP: ICD-10-CM

## 2023-11-07 LAB
VIT D25+D1,25 OH+D1,25 PNL SERPL-MCNC: 23.3 PG/ML — SIGNIFICANT CHANGE UP (ref 19.9–79.3)
VIT D25+D1,25 OH+D1,25 PNL SERPL-MCNC: 23.3 PG/ML — SIGNIFICANT CHANGE UP (ref 19.9–79.3)

## 2023-11-07 PROCEDURE — 99232 SBSQ HOSP IP/OBS MODERATE 35: CPT

## 2023-11-07 RX ADMIN — GABAPENTIN 300 MILLIGRAM(S): 400 CAPSULE ORAL at 18:11

## 2023-11-07 RX ADMIN — ZINC OXIDE 1 APPLICATION(S): 200 OINTMENT TOPICAL at 09:04

## 2023-11-07 RX ADMIN — Medication 1 TABLET(S): at 15:19

## 2023-11-07 RX ADMIN — Medication 1 TABLET(S): at 09:02

## 2023-11-07 RX ADMIN — Medication 1 MILLIGRAM(S): at 15:19

## 2023-11-07 RX ADMIN — ZINC OXIDE 1 APPLICATION(S): 200 OINTMENT TOPICAL at 20:10

## 2023-11-07 RX ADMIN — ZINC OXIDE 1 APPLICATION(S): 200 OINTMENT TOPICAL at 15:20

## 2023-11-07 RX ADMIN — Medication 25 MILLIGRAM(S): at 20:09

## 2023-11-07 RX ADMIN — NYSTATIN CREAM 1 APPLICATION(S): 100000 CREAM TOPICAL at 09:04

## 2023-11-07 RX ADMIN — Medication 1 APPLICATION(S): at 18:11

## 2023-11-07 RX ADMIN — PANTOPRAZOLE SODIUM 40 MILLIGRAM(S): 20 TABLET, DELAYED RELEASE ORAL at 09:02

## 2023-11-07 RX ADMIN — Medication 1 APPLICATION(S): at 09:03

## 2023-11-07 RX ADMIN — SODIUM CHLORIDE 5 MILLILITER(S): 9 INJECTION INTRAMUSCULAR; INTRAVENOUS; SUBCUTANEOUS at 09:04

## 2023-11-07 RX ADMIN — Medication 6 MILLIGRAM(S): at 20:11

## 2023-11-07 RX ADMIN — NYSTATIN CREAM 1 APPLICATION(S): 100000 CREAM TOPICAL at 18:12

## 2023-11-07 RX ADMIN — Medication 56 MILLIGRAM(S): at 09:04

## 2023-11-07 RX ADMIN — Medication 25 MILLIGRAM(S): at 09:02

## 2023-11-07 RX ADMIN — Medication 1 TABLET(S): at 18:11

## 2023-11-07 RX ADMIN — ATOVAQUONE 1500 MILLIGRAM(S): 750 SUSPENSION ORAL at 15:19

## 2023-11-07 RX ADMIN — Medication 1 DROP(S): at 09:02

## 2023-11-07 RX ADMIN — APIXABAN 5 MILLIGRAM(S): 2.5 TABLET, FILM COATED ORAL at 20:09

## 2023-11-07 RX ADMIN — Medication 500 MILLIGRAM(S): at 15:19

## 2023-11-07 RX ADMIN — SODIUM CHLORIDE 5 MILLILITER(S): 9 INJECTION INTRAMUSCULAR; INTRAVENOUS; SUBCUTANEOUS at 18:11

## 2023-11-07 RX ADMIN — APIXABAN 5 MILLIGRAM(S): 2.5 TABLET, FILM COATED ORAL at 09:02

## 2023-11-07 RX ADMIN — GABAPENTIN 300 MILLIGRAM(S): 400 CAPSULE ORAL at 09:02

## 2023-11-07 NOTE — PROGRESS NOTE ADULT - SUBJECTIVE AND OBJECTIVE BOX
Patient is a 62y old  Female who presents with a chief complaint of Interstitial Lung Disease (06 Nov 2023 17:34)    Patient seen and examined at bedside. No overnight events reported.     ALLERGIES:  No Known Allergies    MEDICATIONS  (STANDING):  ammonium lactate 12% Lotion 1 Application(s) Topical every 12 hours  apixaban 5 milliGRAM(s) Oral every 12 hours  artificial  tears Solution 1 Drop(s) Both EYES every 12 hours  ascorbic acid 500 milliGRAM(s) Oral daily  atovaquone  Suspension 1500 milliGRAM(s) Oral daily  bisacodyl 5 milliGRAM(s) Oral <User Schedule>  dextrose 5%. 1000 milliLiter(s) (50 mL/Hr) IV Continuous <Continuous>  dextrose 5%. 1000 milliLiter(s) (100 mL/Hr) IV Continuous <Continuous>  dextrose 50% Injectable 25 Gram(s) IV Push once  dextrose 50% Injectable 12.5 Gram(s) IV Push once  dextrose 50% Injectable 25 Gram(s) IV Push once  folic acid 1 milliGRAM(s) Oral daily  gabapentin 300 milliGRAM(s) Oral two times a day  glucagon  Injectable 1 milliGRAM(s) IntraMuscular once  hydrocortisone hemorrhoidal Suppository 1 Suppository(s) Rectal two times a day  lactobacillus acidophilus 1 Tablet(s) Oral two times a day with meals  melatonin 6 milliGRAM(s) Oral at bedtime  methylPREDNISolone   Oral   methylPREDNISolone 56 milliGRAM(s) Oral daily  metoprolol tartrate 25 milliGRAM(s) Oral two times a day  multivitamin 1 Tablet(s) Oral daily  nystatin Powder 1 Application(s) Topical two times a day  pantoprazole    Tablet 40 milliGRAM(s) Oral before breakfast  psyllium Powder 1 Packet(s) Oral daily  sodium chloride 0.9% lock flush 5 milliLiter(s) IV Push two times a day  zinc oxide 40% Paste 1 Application(s) Topical three times a day    MEDICATIONS  (PRN):  albuterol/ipratropium for Nebulization 3 milliLiter(s) Nebulizer every 6 hours PRN Shortness of Breath and/or Wheezing  ALPRAZolam 0.25 milliGRAM(s) Oral every 6 hours PRN Anxiety  aluminum hydroxide/magnesium hydroxide/simethicone Suspension 30 milliLiter(s) Oral every 4 hours PRN Dyspepsia  benzocaine/menthol Lozenge 1 Lozenge Oral two times a day PRN Sore Throat  benzonatate 100 milliGRAM(s) Oral three times a day PRN Cough  dextrose Oral Gel 15 Gram(s) Oral once PRN Blood Glucose LESS THAN 70 milliGRAM(s)/deciliter  loperamide 2 milliGRAM(s) Oral three times a day PRN Diarrhea  polyethylene glycol 3350 17 Gram(s) Oral daily PRN Constipation  SIMETHICONE SOFTGELS 250 milliGRAM(s) 250 milliGRAM(s) Oral three times a day PRN for gas  sodium chloride 0.65% Nasal 1 Spray(s) Both Nostrils every 8 hours PRN Nasal Congestion    Vital Signs Last 24 Hrs  T(F): 97.9 (07 Nov 2023 08:35), Max: 98.3 (06 Nov 2023 20:59)  HR: 67 (07 Nov 2023 08:35) (67 - 88)  BP: 132/79 (07 Nov 2023 08:35) (100/73 - 132/79)  RR: 16 (07 Nov 2023 08:35) (16 - 16)  SpO2: 96% (07 Nov 2023 08:35) (95% - 96%)  I&O's Summary    PHYSICAL EXAM:  General: NAD, A/O x 3  ENT: No gross hearing impairment, Moist mucous membranes, no thrush  Neck: Supple, No JVD  Lungs: Clear to auscultation bilaterally, good air entry, non-labored breathing  Cardio: RRR, S1/S2, No murmur  Abdomen: Soft, Nontender, Nondistended; Bowel sounds present; obese  Extremities: No calf tenderness, No cyanosis, 2+ pitting edema and lymphedema, bilateral symmetric leg weakness   Psych: mildly anxious    LABS:                        10.5   14.20 )-----------( 414      ( 06 Nov 2023 10:55 )             33.6     11-06    136  |  101  |  20  ----------------------------<  133  4.1   |  31  |  0.46    Ca    9.3      06 Nov 2023 10:55    TPro  5.4  /  Alb  2.5  /  TBili  1.3  /  DBili  x   /  AST  50  /  ALT  108  /  AlkPhos  327  11-06      09-25 Chol -- LDL -- HDL -- Trig 199 mg/dL    Urinalysis Basic - ( 06 Nov 2023 10:55 )    Color: x / Appearance: x / SG: x / pH: x  Gluc: 133 mg/dL / Ketone: x  / Bili: x / Urobili: x   Blood: x / Protein: x / Nitrite: x   Leuk Esterase: x / RBC: x / WBC x   Sq Epi: x / Non Sq Epi: x / Bacteria: x

## 2023-11-07 NOTE — PROGRESS NOTE ADULT - ASSESSMENT
Assessment/Plan:  ALIZA FOLEY is a 62 year old female with PMH of pAFIB and sciatica; who presented to [facility] ED with SOB, and was found to have groundglass opacities and interstitial lung disease. She was treated with empiric Vancomycin and Zosyn. She underwent a bronchoscopy with bronchoalveolar lavage and pulse steroids. She was given IV diuretics for increased pulmonary congestion, but her respiratory status continued to worsen.  On 9/13, she was transferred to [facility] for ECMO requirements. She was further treated with Plasmapheresis, Rituximab and high-dose steroids, with significant improvement. Her overall hospital course was complicated by thrombocytopenia (s/p several platelet transfusions), leukocytosis (infectious workup entirely negative- s/p Vanco and Ceftriaxone), AFIB with RVR (resolved with Metoprolol), dysphagia (requiring NGT), transaminitis (secondary to acute illness and hypotension),  non-occlusive RIJ DVT (started on Lovenox). Patient now admitted for a multidisciplinary rehab program. 10-05-23 @ 13:18    * ENT review for episodic chocking sensation and hx of postnasal drip  * IDT today, overall sustained improvement of functional status, mainly upper extremity  * Continue tapering oxygen,  * F/u Pulmonary review appreciated, Due Repeat CT chest 11/11    #Interstitial Lung Disease and Mixed connective tissue disease  - Gait Instability, ADL impairments and Functional impairments: start Comprehensive Rehab Program of PT/OT/SLP - 3 hours a day, 5 days a week  - P&O as needed   - Non-specific Interstitial Lung Disease  - Requiring ECMO   - Improvement s/p Plasmapheresis, Rituximab and high- dose steroids   - Albuterol/Ipratropium Nebulizer every 6 hours  - Mepron 1500mg daily  - PO Medrol ( due to liver dysfunction): Plan will be to taper by ~20% every 2 weeks    Medrol   64mg x 2 weeks   56mg x 2 weeks  44mg x 2 weeks   36mg x 2 weeks - Follow up Outpatient   -- Repeat CT Chest in 6 weeks   --Pulmonary team--f/u with patient and clarify when she can get immunizations    -Perform BL elbow flexion & elbow extension with antigravity in therapy  - Noted to have missed 5 days between steroid taper dosing- resumed at 56mg on 10/26- pulm recommends to continue as originally instructed   - Plan to wean 02 as tolerated, Continue tapering oxygen,  - Repeat CT Chest on 11/11     * Await ENT review for episodic chocking sensation and hx of postnasal drip    #pAFIB/Rt Ij thrombus  - Metoprolol 25mg BID and lovenox  --- Eliquis 5mg BID - tolerating well     # Chronic Tinnitus   - ENT review and recs appreciate, Patient already made ENT appointment for f/u    # Lymphedema both legs -chronic and Rt IJ thrombus  - ACE Wrap BL LEs  - BL LE doppler negative for DVT  - BL UE doppler with chronic thrombotic changes in RIJ     #Transaminitis --LFT Down trending   - Secondary to acute illness and hypotension at [facility]  - Outpatient follow up     #Neuropathy  - Gabapentin 300 mg BID  --Work on motor strengthening, priority for UE as preferred by patient   - Vit b12 >2,000 in 9/2023    #Sleep/Mood  - Melatonin 6mg at HS  - Psych rec Xanax 0.25mg PRN    #Skin  - Skin: Left lower abdomen ruptured blister 3.0 x 1.0, stage 2 pressure injury to right buttock 4 x1 and left buttock 3x1, stage 2 pressure injury ti right inner thigh 0.2 x 0.2, right groin wound 10.5 x 2.0, Left groin wound 5 x 5,  right neck scab wound  -- MAD to skin folds- Nystatin to submammary and abdominal pannus BID  -- MAD to L groin- cleanse with NS, Triad cream daily  -- Mad to R groin- Nystatin powder daily   -- R groin wound-- aquacel packing, Triad to periwound, Allevyn foam- daily and PRN   - Pressure injury/Skin: OOB to Chair, PT/OT   - Offload pressure, low air loss support surfaces, T&P every 2 hours   --Wound care consult-10/9 -Multiple wounds--perineal and buttock/sacral, recs appreciated   --Suggest Continue treatments as below:   Twice daily & PRN Normal Saline Cleanse of abdominal pannus & breast folds, perineal, Left & Right inner buttock erosions.  Pat thoroughly dry.   Apply Desitin generously twice daily (& PRN) to perineal, buttocks, and left groin erosions, leave open to air.    Apply Nystatin powder to abdominal pannus and breast folds.  Suggest use of Interdry cloths in folds to 'wick' moisture.  Suggest daily Normal Saline Cleanse of right groin surgical wound, pat dry.  Apply Cavillon film barrier to intact periwound skin.  Place Aquacell strip over open area, cover with foam dressing.  - Wound care following     #Pain Mgmt   - Tylenol PRN   - Gabapentin 300mg at HS     #GI/Bowel Mgmt   - Pantoprazole  - Senna  --on hold due to recent Loose BM  - PRN: Maalox   - Stool count  --Lactobacillus BID   -- AB XR mod stool burden on transverse colon 10/23--still has mod stool burden, but moving bowel appropriately  - S/p enema and lactulose    # Alternating bowel function --continue probiotic, lactobacillus   * Leucocytosis probably steroid related and partly due to her Mixed Connective Tissue Disease, will continue monitoring     #/Bladder Mgmt   -Spontaneously voiding   - UA (11/3) negative    #FEN   #Dysphagia  - Diet - Minced & Moist  [CC]    - Dysphaiga- SLP evaluation     #Health maintenance  - Folic Acid   - MVI  - Ocean nasal spray    # Difficulty with access to peripheral veins-- Blood draws from Left arm Medline     #Precautions / PROPHYLAXIS:   - Falls  - ortho: Weight bearing status: WBAT   - Lungs: Aspiration, Incentive Spirometer   - DVT PPX: Eliquis 5 mg BID   ---------------------------  IDT conference on 11/7  Social Work: Await peer to peer with CM. Provided private hire list. Lives with spouse in [place] 6 months of year. [facility] to [facility] [place] in [place] with elevator, no steps. Supportive spouse.   Function--Min assistance upper body dressing and upper body grooming  Total assistance with transfers, sitting balance significantly improved, now independent  Able to eat with both hands and comb hair unassisted  Ambulation--Max Assist with light gate x 4 persons  Barrier--Requirement for max assistance most activities, still still 02 dependent  DME--Wheel chair  Est dc home vs Subacute Rehab 11/14 (revised dc date in view of sustained improvement, especially with upper extremity function)    OUTPATIENT/FOLLOW UP:    Trae Whitney  Pulmonary Disease  [address], [address] [place] [address]  Phone: [phone]  Fax: [phone]  Follow Up Time: 2 weeks    Ryanne Allen  Rheumatology  [address] [address]  Phone: [phone]  Fax: [phone]  Follow Up Time: 1 week    Kyle Pierce  Internal Medicine  [address] [place] [address], [address]  [place] [address]  Phone: [phone]  Fax: [phone]  Follow Up Time: 2 weeks    Addy Powers  Pulmonary Disease  [address] [id number]  Phone: [phone]  Fax: [phone]  Follow Up Time:     Kwame Hawley  Critical Care Medicine  [address] [facility], [address] [address]  Phone: [phone]  Fax: [phone]  Follow Up Time:     Neena Borrego  Rheumatology  [address] [place] [address], [address]  [place] [address]  Phone: [phone]  Fax: [phone]  Follow Up Time:    [facility]  [facility] [address]  Scheduled Appointment: 09/13/2023  2:40 PM  ---------------------------

## 2023-11-07 NOTE — CONSULT NOTE ADULT - SUBJECTIVE AND OBJECTIVE BOX
Patient is a 62y old  Female who presents with a chief complaint of Interstitial Lung Disease (07 Nov 2023 14:47)      HPI:  Zo Hager is a 62 year old female with PMH of pAFIB and sciatica; who presented to [facility] ED with SOB. She has been experiencing chronic SOB and non-productive cough for several months and was worked up in [place] where she had a CT scan which resulted negative.  She has been evaluated by Pulmonology and Rheumatology and was ruled out for lupus and immunological disorders.  She recently went to Urgent Care due to SOB with ambulating short distances (12-15 feet), and an XR was concerning for BL LL infiltrates. While at [facility],  CXR and CTA were significant for ground glass opacities, and interstitial lung disease, respectively. She was treated with empiric Vancomycin and Zosyn. She underwent a bronchoscopy with bronchoalveolar lavage and pulse steroids. She was given IV diuretics for increased pulmonary congestion, but her respiratory status continued to worsen.  On 9/13, she was transferred to [facility] for ECMO requirements. She was further treated with Plasmapheresis, Rituximab and high-dose steroids, with significant improvement.   Her overall hospital course was complicated by thrombocytopenia (s/p several platelet transfusions), leukocytosis (infectious workup entirely negative- s/p Vanco and Ceftriaxone), AFIB with RVR (resolved with Metoprolol), dysphagia (requiring NGT), transaminitis (secondary to acute illness and hypotension),  non-occlusive RIJ DVT (started on Lovenox). PM&R was consulted and deemed her an appropriate candidate for IRF. She was medically stabilized and cleared for discharge to [facility].  (05 Oct 2023 13:13)      Interval Events:  Patient reports that she had woken up yesterday with a sensation of something stuck in her throat, especially when she has to take her medications.  She reported that after a while of trying to expectorate whatever is in her throat, she coughed up phlegm and mucus.  She reports that she has noticed increased postnasal drainage.  She also noted that she usually sleeps on her side and since she has been sleeping on her back, it may have worsened her PND.  She reported that the sensation has since resolved and that she is able to swallow normally and tolerate her diet.  She is refusing laryngoscopy examination at this time.    REVIEW OF SYSTEMS:     CONSTITUTIONAL: No weakness, fevers or chills     EYES/ENT: No visual changes;  No vertigo or throat pain      NECK: No pain or stiffness     RESPIRATORY: No cough, wheezing, hemoptysis; No shortness of breath     CARDIOVASCULAR: No chest pain or palpitations     GASTROINTESTINAL: No abdominal or epigastric pain. No nausea, vomiting, or hematemesis; No diarrhea or constipation. No melena or hematochezia.     GENITOURINARY: No dysuria, frequency or hematuria     NEUROLOGICAL: No numbness or weakness     SKIN: No itching, burning, rashes, or lesions   All other review of systems is negative unless indicated above.    MEDICATIONS  (STANDING):  ammonium lactate 12% Lotion 1 Application(s) Topical every 12 hours  apixaban 5 milliGRAM(s) Oral every 12 hours  artificial  tears Solution 1 Drop(s) Both EYES every 12 hours  ascorbic acid 500 milliGRAM(s) Oral daily  atovaquone  Suspension 1500 milliGRAM(s) Oral daily  bisacodyl 5 milliGRAM(s) Oral <User Schedule>  dextrose 5%. 1000 milliLiter(s) (50 mL/Hr) IV Continuous <Continuous>  dextrose 5%. 1000 milliLiter(s) (100 mL/Hr) IV Continuous <Continuous>  dextrose 50% Injectable 25 Gram(s) IV Push once  dextrose 50% Injectable 12.5 Gram(s) IV Push once  dextrose 50% Injectable 25 Gram(s) IV Push once  folic acid 1 milliGRAM(s) Oral daily  gabapentin 300 milliGRAM(s) Oral two times a day  glucagon  Injectable 1 milliGRAM(s) IntraMuscular once  hydrocortisone hemorrhoidal Suppository 1 Suppository(s) Rectal two times a day  lactobacillus acidophilus 1 Tablet(s) Oral two times a day with meals  melatonin 6 milliGRAM(s) Oral at bedtime  methylPREDNISolone   Oral   methylPREDNISolone 56 milliGRAM(s) Oral daily  metoprolol tartrate 25 milliGRAM(s) Oral two times a day  multivitamin 1 Tablet(s) Oral daily  nystatin Powder 1 Application(s) Topical two times a day  pantoprazole    Tablet 40 milliGRAM(s) Oral before breakfast  psyllium Powder 1 Packet(s) Oral daily  sodium chloride 0.9% lock flush 5 milliLiter(s) IV Push two times a day  zinc oxide 40% Paste 1 Application(s) Topical three times a day    MEDICATIONS  (PRN):  albuterol/ipratropium for Nebulization 3 milliLiter(s) Nebulizer every 6 hours PRN Shortness of Breath and/or Wheezing  ALPRAZolam 0.25 milliGRAM(s) Oral every 6 hours PRN Anxiety  aluminum hydroxide/magnesium hydroxide/simethicone Suspension 30 milliLiter(s) Oral every 4 hours PRN Dyspepsia  benzocaine/menthol Lozenge 1 Lozenge Oral two times a day PRN Sore Throat  benzonatate 100 milliGRAM(s) Oral three times a day PRN Cough  dextrose Oral Gel 15 Gram(s) Oral once PRN Blood Glucose LESS THAN 70 milliGRAM(s)/deciliter  loperamide 2 milliGRAM(s) Oral three times a day PRN Diarrhea  polyethylene glycol 3350 17 Gram(s) Oral daily PRN Constipation  SIMETHICONE SOFTGELS 250 milliGRAM(s) 250 milliGRAM(s) Oral three times a day PRN for gas  sodium chloride 0.65% Nasal 1 Spray(s) Both Nostrils every 8 hours PRN Nasal Congestion                            10.5   14.20 )-----------( 414      ( 06 Nov 2023 10:55 )             33.6     11-06    136  |  101  |  20  ----------------------------<  133<H>  4.1   |  31  |  0.46<L>    Ca    9.3      06 Nov 2023 10:55    TPro  5.4<L>  /  Alb  2.5<L>  /  TBili  1.3<H>  /  DBili  x   /  AST  50<H>  /  ALT  108<H>  /  AlkPhos  327<H>  11-06    I&O's Detail    Vital Signs Last 24 Hrs  T(C): 36.6 (07 Nov 2023 08:35), Max: 36.8 (06 Nov 2023 20:59)  T(F): 97.9 (07 Nov 2023 08:35), Max: 98.3 (06 Nov 2023 20:59)  HR: 67 (07 Nov 2023 08:35) (67 - 88)  BP: 132/79 (07 Nov 2023 08:35) (115/76 - 132/79)  BP(mean): --  RR: 16 (07 Nov 2023 08:35) (16 - 16)  SpO2: 96% (07 Nov 2023 08:35) (95% - 96%)    Parameters below as of 07 Nov 2023 08:35  Patient On (Oxygen Delivery Method): nasal cannula  O2 Flow (L/min): 2    CBC Full  -  ( 06 Nov 2023 10:55 )  WBC Count : 14.20 K/uL  RBC Count : 3.50 M/uL  Hemoglobin : 10.5 g/dL  Hematocrit : 33.6 %  Platelet Count - Automated : 414 K/uL  Mean Cell Volume : 96.0 fl  Mean Cell Hemoglobin : 30.0 pg  Mean Cell Hemoglobin Concentration : 31.3 gm/dL  Auto Neutrophil # : 11.93 K/uL  Auto Lymphocyte # : 0.82 K/uL  Auto Monocyte # : 1.15 K/uL  Auto Eosinophil # : 0.01 K/uL  Auto Basophil # : 0.04 K/uL  Auto Neutrophil % : 83.9 %  Auto Lymphocyte % : 5.8 %  Auto Monocyte % : 8.1 %  Auto Eosinophil % : 0.1 %  Auto Basophil % : 0.3 %        PHYSICAL EXAM:     Constitutional Normal: overweight, no acute distress     Psychiatric: age appropriate behavior, cooperative     Skin: no obvious skin lesions     Head: Normocephalic, Atraumatic     Lymphatic: no cervical lymphadenopathy     ENT:        External Ear:  Normal without any obvious lesions        External Nose:  Normal, no structural deformities		     Oral Cavity:  Good dentition, tongue midline, no lesions or ulcerations, uvula midline     Neck: No palpable lymphadenopathy     Pulmonary: No Acute Distress.      CV: no peripheral edema/cyanosis     GI: nondistended, nontender     Peripheral vascular: no JVD or edema     Neurologic: awake and alert, no obvious facial weakness    LARYNGOSCOPY:       Patient refused.

## 2023-11-07 NOTE — PROGRESS NOTE ADULT - ASSESSMENT
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  -c/w steroids - taper as scheduled  -c/w nebs  -c/w PT/OT  -Repeat CT chest planned for 1/11 - routine follow-up  -Mepron for PCP ppx while on steroids     #PAF  #Non-occlusive right IJ thrombus   -c/w Eliquis  -c/w lopressor    #Anxiety  -appreciate psych follow-up  -c/w Xanax PRN    #DVT ppx: Eliquis 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 LFTs have been "stable" and alk phos can take much longer to resolve to do longer half-life  -monitor per routine, monitor for any uptrends in numbers    #DVT ppx: Eliquis

## 2023-11-07 NOTE — PROGRESS NOTE ADULT - SUBJECTIVE AND OBJECTIVE BOX
Patient is a 62y old  Female who presents with a chief complaint of Interstitial Lung Disease     HPI:  Zo Hager is a 62 year old female with PMH of pAFIB and sciatica; who presented to [facility] ED with SOB. She has been experiencing chronic SOB and non-productive cough for several months and was worked up in [place] where she had a CT scan which resulted negative.  She has been evaluated by Pulmonology and Rheumatology and was ruled out for lupus and immunological disorders.  She recently went to Urgent Care due to SOB with ambulating short distances (12-15 feet), and an XR was concerning for BL LL infiltrates. While at [facility],  CXR and CTA were significant for ground glass opacities, and interstitial lung disease, respectively. She was treated with empiric Vancomycin and Zosyn. She underwent a bronchoscopy with bronchoalveolar lavage and pulse steroids. She was given IV diuretics for increased pulmonary congestion, but her respiratory status continued to worsen.  On 9/13, she was transferred to [facility] for ECMO requirements. She was further treated with Plasmapheresis, Rituximab and high-dose steroids, with significant improvement.   Her overall hospital course was complicated by thrombocytopenia (s/p several platelet transfusions), leukocytosis (infectious workup entirely negative- s/p Vanco and Ceftriaxone), AFIB with RVR (resolved with Metoprolol), dysphagia (requiring NGT), transaminitis (secondary to acute illness and hypotension),  non-occlusive RIJ DVT (started on Lovenox). PM&R was consulted and deemed her an appropriate candidate for IRF. She was medically stabilized and cleared for discharge to [facility].     PAST MEDICAL & SURGICAL HISTORY:  Afib  Sciatica  Interstitial lung disease  Lymphedema    SUBJECTIVE/ROS:  Patient seen and sitting up in recliner, family at bedside.    Report chocking sensation while eating yesterday,  Slept well, tolerating oral diet  Reports hx of seasonal allergy, had been taking OTC allergy meds  Also reports hx of post nasal drip    ROS--No chest pain, chills, fever or dyspnea, on NC oxy 1L  Chocking sensation when eating yesterday    Therapy.  Observed eating with his cutlery, unassisted, able to comb her hair,   IDT today--overall markedly improved upper extremity motto function    Due ENT review today for episodic changing sensation    Vital Signs Last 24 Hrs  T(C): 36.6 (07 Nov 2023 08:35), Max: 36.8 (06 Nov 2023 20:59)  T(F): 97.9 (07 Nov 2023 08:35), Max: 98.3 (06 Nov 2023 20:59)  HR: 67 (07 Nov 2023 08:35) (67 - 88)  BP: 132/79 (07 Nov 2023 08:35) (115/76 - 132/79)  RR: 16 (07 Nov 2023 08:35) (16 - 16)  SpO2: 96% (07 Nov 2023 08:35) (95% - 96%)  O2 Parameters below as of 07 Nov 2023 08:35  Patient On (Oxygen Delivery Method): nasal cannula  O2 Flow (L/min): 2    PHYSICAL EXAM:  Gen -  NAD, Comfortable, IL NC oxy 1L 95%  Neck - Supple, No limited ROM, O2 via NC  Pulm - clear  Cardiovascular - warm and well perfused, no cyanosis  Abdomen - Soft, non tender   Extremities - edema to b/l LE, no calf tenderness, LUE Med line    Neuro-     Cognitive - awake, alert, fully oriented, engaging, speech normal      Motor -                    LEFT    UE - 4/5                    RIGHT UE - 4/5                     LEFT    LE - 3+/5, distally and 2/5 proximal                    RIGHT LE - 3+/5  limited by leg edema,, which is reducing      Sensory - Intact     MSK: soreness and weakness  Psychiatric - Mood stable, Affect WNL  Skin (unable to assess while sitting in recliner): previous exam: Left lower abdomen ruptured blister 3.0 x 1.0, stage 2 pressure injury to right buttock 4 x1 and left buttock 3x1, stage 2 pressure injury ti right inner thigh 0.2 x 0.2, right groin wound 10.5 x 2.0, Left groin wound 5 x 5    MMT--Able to contract B/L upper back muscles, EF/EE 2/5    RECENT LABS:                          10.5   14.20 )-----------( 414      ( 06 Nov 2023 10:55 )             33.6     11-06    136  |  101  |  20  ----------------------------<  133<H>  4.1   |  31  |  0.46<L>    Ca    9.3      06 Nov 2023 10:55    TPro  5.4<L>  /  Alb  2.5<L>  /  TBili  1.3<H>  /  DBili  x   /  AST  50<H>  /  ALT  108<H>  /  AlkPhos  327<H>  11-06    LIVER FUNCTIONS - ( 06 Nov 2023 10:55 )  Alb: 2.5 g/dL / Pro: 5.4 g/dL / ALK PHOS: 327 U/L / ALT: 108 U/L / AST: 50 U/L / GGT: x           Allergies  No Known Allergies    MEDICATIONS  (STANDING):  ammonium lactate 12% Lotion 1 Application(s) Topical every 12 hours  apixaban 5 milliGRAM(s) Oral every 12 hours  artificial  tears Solution 1 Drop(s) Both EYES every 12 hours  ascorbic acid 500 milliGRAM(s) Oral daily  atovaquone  Suspension 1500 milliGRAM(s) Oral daily  bisacodyl 5 milliGRAM(s) Oral <User Schedule>  dextrose 5%. 1000 milliLiter(s) (50 mL/Hr) IV Continuous <Continuous>  dextrose 5%. 1000 milliLiter(s) (100 mL/Hr) IV Continuous <Continuous>  dextrose 50% Injectable 25 Gram(s) IV Push once  dextrose 50% Injectable 12.5 Gram(s) IV Push once  dextrose 50% Injectable 25 Gram(s) IV Push once  folic acid 1 milliGRAM(s) Oral daily  gabapentin 300 milliGRAM(s) Oral two times a day  glucagon  Injectable 1 milliGRAM(s) IntraMuscular once  hydrocortisone hemorrhoidal Suppository 1 Suppository(s) Rectal two times a day  lactobacillus acidophilus 1 Tablet(s) Oral two times a day with meals  melatonin 6 milliGRAM(s) Oral at bedtime  methylPREDNISolone   Oral   methylPREDNISolone 56 milliGRAM(s) Oral daily  metoprolol tartrate 25 milliGRAM(s) Oral two times a day  multivitamin 1 Tablet(s) Oral daily  nystatin Powder 1 Application(s) Topical two times a day  pantoprazole    Tablet 40 milliGRAM(s) Oral before breakfast  psyllium Powder 1 Packet(s) Oral daily  sodium chloride 0.9% lock flush 5 milliLiter(s) IV Push two times a day  zinc oxide 40% Paste 1 Application(s) Topical three times a day    MEDICATIONS  (PRN):  albuterol/ipratropium for Nebulization 3 milliLiter(s) Nebulizer every 6 hours PRN Shortness of Breath and/or Wheezing  ALPRAZolam 0.25 milliGRAM(s) Oral every 6 hours PRN Anxiety  aluminum hydroxide/magnesium hydroxide/simethicone Suspension 30 milliLiter(s) Oral every 4 hours PRN Dyspepsia  benzocaine/menthol Lozenge 1 Lozenge Oral two times a day PRN Sore Throat  benzonatate 100 milliGRAM(s) Oral three times a day PRN Cough  dextrose Oral Gel 15 Gram(s) Oral once PRN Blood Glucose LESS THAN 70 milliGRAM(s)/deciliter  loperamide 2 milliGRAM(s) Oral three times a day PRN Diarrhea  polyethylene glycol 3350 17 Gram(s) Oral daily PRN Constipation  SIMETHICONE SOFTGELS 250 milliGRAM(s) 250 milliGRAM(s) Oral three times a day PRN for gas  sodium chloride 0.65% Nasal 1 Spray(s) Both Nostrils every 8 hours PRN Nasal Congestion

## 2023-11-07 NOTE — CONSULT NOTE ADULT - PROBLEM SELECTOR RECOMMENDATION 9
- Patient will need to follow up with ENT after discharge.  - Hearing test after discharge is recommended but patient reports she has already had one done.
-  Patient is currently refusing laryngoscopy examination noting that her symptoms have since resolved.  -  Start Flonase two spray each nostril daily to decrease nasal secretions  -  Patient agreed that if symptoms recur, a laryngoscopy examination is needed
-  Dried blood and nasal debris suctioned from both nostrils  -  Saline spray to both nostrils q4h PRN  -  No active bleeding observed  -  Continue humidified oxygen
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  DOAC as per cardiology  manage as per med/cardiology  trend H/H if starting DOAC  discussed with Vascular surgeon

## 2023-11-08 PROCEDURE — 99232 SBSQ HOSP IP/OBS MODERATE 35: CPT

## 2023-11-08 PROCEDURE — 99233 SBSQ HOSP IP/OBS HIGH 50: CPT

## 2023-11-08 RX ORDER — ALPRAZOLAM 0.25 MG
0.25 TABLET ORAL EVERY 6 HOURS
Refills: 0 | Status: DISCONTINUED | OUTPATIENT
Start: 2023-11-08 | End: 2023-11-13

## 2023-11-08 RX ADMIN — PANTOPRAZOLE SODIUM 40 MILLIGRAM(S): 20 TABLET, DELAYED RELEASE ORAL at 08:48

## 2023-11-08 RX ADMIN — Medication 1 DROP(S): at 08:49

## 2023-11-08 RX ADMIN — Medication 500 MILLIGRAM(S): at 15:59

## 2023-11-08 RX ADMIN — SODIUM CHLORIDE 5 MILLILITER(S): 9 INJECTION INTRAMUSCULAR; INTRAVENOUS; SUBCUTANEOUS at 08:46

## 2023-11-08 RX ADMIN — Medication 25 MILLIGRAM(S): at 20:08

## 2023-11-08 RX ADMIN — Medication 1 TABLET(S): at 16:00

## 2023-11-08 RX ADMIN — Medication 1 APPLICATION(S): at 18:16

## 2023-11-08 RX ADMIN — ZINC OXIDE 1 APPLICATION(S): 200 OINTMENT TOPICAL at 12:18

## 2023-11-08 RX ADMIN — Medication 1 TABLET(S): at 08:48

## 2023-11-08 RX ADMIN — GABAPENTIN 300 MILLIGRAM(S): 400 CAPSULE ORAL at 18:19

## 2023-11-08 RX ADMIN — Medication 1 TABLET(S): at 18:21

## 2023-11-08 RX ADMIN — NYSTATIN CREAM 1 APPLICATION(S): 100000 CREAM TOPICAL at 08:47

## 2023-11-08 RX ADMIN — Medication 56 MILLIGRAM(S): at 08:47

## 2023-11-08 RX ADMIN — GABAPENTIN 300 MILLIGRAM(S): 400 CAPSULE ORAL at 08:37

## 2023-11-08 RX ADMIN — NYSTATIN CREAM 1 APPLICATION(S): 100000 CREAM TOPICAL at 18:19

## 2023-11-08 RX ADMIN — ATOVAQUONE 1500 MILLIGRAM(S): 750 SUSPENSION ORAL at 16:00

## 2023-11-08 RX ADMIN — APIXABAN 5 MILLIGRAM(S): 2.5 TABLET, FILM COATED ORAL at 20:08

## 2023-11-08 RX ADMIN — Medication 1 APPLICATION(S): at 08:49

## 2023-11-08 RX ADMIN — Medication 6 MILLIGRAM(S): at 20:09

## 2023-11-08 RX ADMIN — Medication 1 MILLIGRAM(S): at 15:59

## 2023-11-08 RX ADMIN — SODIUM CHLORIDE 5 MILLILITER(S): 9 INJECTION INTRAMUSCULAR; INTRAVENOUS; SUBCUTANEOUS at 18:18

## 2023-11-08 RX ADMIN — ZINC OXIDE 1 APPLICATION(S): 200 OINTMENT TOPICAL at 08:50

## 2023-11-08 RX ADMIN — Medication 1 SUPPOSITORY(S): at 09:02

## 2023-11-08 RX ADMIN — APIXABAN 5 MILLIGRAM(S): 2.5 TABLET, FILM COATED ORAL at 08:48

## 2023-11-08 RX ADMIN — Medication 25 MILLIGRAM(S): at 08:48

## 2023-11-08 NOTE — PROGRESS NOTE ADULT - SUBJECTIVE AND OBJECTIVE BOX
Time of Visit:  Patient seen and examined. sitting  in chair comfortable , off O2 and partner at bedside   c/c of episode of hypoxia     MEDICATIONS  (STANDING):  ammonium lactate 12% Lotion 1 Application(s) Topical every 12 hours  apixaban 5 milliGRAM(s) Oral every 12 hours  artificial  tears Solution 1 Drop(s) Both EYES every 12 hours  ascorbic acid 500 milliGRAM(s) Oral daily  atovaquone  Suspension 1500 milliGRAM(s) Oral daily  bisacodyl 5 milliGRAM(s) Oral <User Schedule>  dextrose 5%. 1000 milliLiter(s) (50 mL/Hr) IV Continuous <Continuous>  dextrose 5%. 1000 milliLiter(s) (100 mL/Hr) IV Continuous <Continuous>  dextrose 50% Injectable 12.5 Gram(s) IV Push once  dextrose 50% Injectable 25 Gram(s) IV Push once  dextrose 50% Injectable 25 Gram(s) IV Push once  folic acid 1 milliGRAM(s) Oral daily  gabapentin 300 milliGRAM(s) Oral two times a day  glucagon  Injectable 1 milliGRAM(s) IntraMuscular once  hydrocortisone hemorrhoidal Suppository 1 Suppository(s) Rectal two times a day  lactobacillus acidophilus 1 Tablet(s) Oral two times a day with meals  melatonin 6 milliGRAM(s) Oral at bedtime  methylPREDNISolone   Oral   metoprolol tartrate 25 milliGRAM(s) Oral two times a day  multivitamin 1 Tablet(s) Oral daily  nystatin Powder 1 Application(s) Topical two times a day  pantoprazole    Tablet 40 milliGRAM(s) Oral before breakfast  psyllium Powder 1 Packet(s) Oral daily  sodium chloride 0.9% lock flush 5 milliLiter(s) IV Push two times a day  zinc oxide 40% Paste 1 Application(s) Topical three times a day      MEDICATIONS  (PRN):  albuterol/ipratropium for Nebulization 3 milliLiter(s) Nebulizer every 6 hours PRN Shortness of Breath and/or Wheezing  ALPRAZolam 0.25 milliGRAM(s) Oral every 6 hours PRN Anxiety  aluminum hydroxide/magnesium hydroxide/simethicone Suspension 30 milliLiter(s) Oral every 4 hours PRN Dyspepsia  benzocaine/menthol Lozenge 1 Lozenge Oral two times a day PRN Sore Throat  benzonatate 100 milliGRAM(s) Oral three times a day PRN Cough  dextrose Oral Gel 15 Gram(s) Oral once PRN Blood Glucose LESS THAN 70 milliGRAM(s)/deciliter  loperamide 2 milliGRAM(s) Oral three times a day PRN Diarrhea  polyethylene glycol 3350 17 Gram(s) Oral daily PRN Constipation  SIMETHICONE SOFTGELS 250 milliGRAM(s) 250 milliGRAM(s) Oral three times a day PRN for gas  sodium chloride 0.65% Nasal 1 Spray(s) Both Nostrils every 8 hours PRN Nasal Congestion       Medications up to date at time of exam.      PHYSICAL EXAMINATION:  Patient has no new complaints.  GENERAL: The patient is a well-developed, well-nourished, in no apparent distress.     Vital Signs Last 24 Hrs  T(C): 36.9 (08 Nov 2023 08:34), Max: 36.9 (08 Nov 2023 08:34)  T(F): 98.5 (08 Nov 2023 08:34), Max: 98.5 (08 Nov 2023 08:34)  HR: 81 (08 Nov 2023 08:34) (69 - 81)  BP: 107/62 (08 Nov 2023 08:34) (107/62 - 130/84)  BP(mean): --  RR: 16 (08 Nov 2023 08:34) (16 - 16)  SpO2: 97% (08 Nov 2023 08:34) (95% - 97%)    Parameters below as of 08 Nov 2023 08:34  Patient On (Oxygen Delivery Method): nasal cannula  O2 Flow (L/min): 1     (if applicable)    Chest Tube (if applicable)    HEENT: Head is normocephalic and atraumatic. Extraocular muscles are intact. Mucous membranes are moist.     NECK: Supple, no palpable adenopathy.    LUNGS: Clear to auscultation, no wheezing, rales, or rhonchi.    HEART: Regular rate and rhythm without murmur.    ABDOMEN: Soft, nontender, and nondistended.  No hepatosplenomegaly is noted.    : No painful voiding, no pelvic pain    EXTREMITIES: Without any cyanosis, clubbing, rash, lesions or edema.    NEUROLOGIC: motor 3+/5 x 4  AAO x 3     SKIN: Warm, dry, good turgor.        MICROBIOLOGY: (if applicable)    RADIOLOGY & ADDITIONAL STUDIES:  EKG:   CXR:  ECHO:    IMPRESSION: 62y Female PAST MEDICAL & SURGICAL HISTORY:  Afib      Sciatica      Interstitial lung disease      Lymphedema       p/w         IMP: This is a 62 yr old woman  PMH A-Fib with recently diagnosed MCTD-ILD (+ARIANA 1:1280, RNP>8, Sm>8) who was admitted to [facility] with acute hypoxemic respiratory failure that initially responded to steroids, then with worsening after taper with development of acute hypoxemic and hypercapnic respiratory failure requiring intubation and VV- ECMO on 9/13, then transferred to [facility], concerning for DAH vs ILD flare, decannulated from VV-ECMO 9/21, extubated 9/22. S/p pulse steroids, PLEX (9/15, 9/18, 9/20) and Rituximab (9/16 & 10/3). Course c/b severe leukopenia s/p filgastrim, shock liver with slow to resolved hyperbilirubinemia, RIJ DVT, oropharyngeal dysphagia, and recurrent SVT. Repeat CT chest on 9/30 with markedly improved bilateral groundglass opacities with resolved lung consolidations. Now in acute rehab on 4 lpm NC oxygen and tapering dose of medrol.     Problem List:    - Acute Hypoxic Resp failure   -  Interstitial lung disease   -  Mixed connective tissue disease   -  Acute hypoxia  -  RIJ DVT     Plan:  - Hypoxic episodes while asleep probable due to JACEY   - Continue supp O2 at night   - WBC elevation due to steroid  - Cont. Methylprednisolone PO, taper as per rheumatology recs. from [facility]   - Continue atovaquone for PCP prophylaxis  - S/p Rituximab 9/16 and 10/3  - S/p PLEX during MICU stay  - Repeat CT scan in 6 weeks ~ November 11  - No evidence of active infection, monitor off antibiotics  - Cont. anticoagulation for DVT associated with ECMO cannula  - Consider incentive spirometry   - Care per primary team  - Discussed with spouse at bedside   - PT OOB as tolerated  - Outpatient pulmonary and rheumatology follow up upon discharge      spoke with partner at bedside

## 2023-11-08 NOTE — PROGRESS NOTE ADULT - SUBJECTIVE AND OBJECTIVE BOX
Patient is a 62y old  Female who presents with a chief complaint of Interstitial Lung Disease (08 Nov 2023 15:06)    Patient seen and examined at bedside. No overnight events reported.     ALLERGIES:  No Known Allergies    MEDICATIONS  (STANDING):  ammonium lactate 12% Lotion 1 Application(s) Topical every 12 hours  apixaban 5 milliGRAM(s) Oral every 12 hours  artificial  tears Solution 1 Drop(s) Both EYES every 12 hours  ascorbic acid 500 milliGRAM(s) Oral daily  atovaquone  Suspension 1500 milliGRAM(s) Oral daily  bisacodyl 5 milliGRAM(s) Oral <User Schedule>  dextrose 5%. 1000 milliLiter(s) (50 mL/Hr) IV Continuous <Continuous>  dextrose 5%. 1000 milliLiter(s) (100 mL/Hr) IV Continuous <Continuous>  dextrose 50% Injectable 25 Gram(s) IV Push once  dextrose 50% Injectable 25 Gram(s) IV Push once  dextrose 50% Injectable 12.5 Gram(s) IV Push once  folic acid 1 milliGRAM(s) Oral daily  gabapentin 300 milliGRAM(s) Oral two times a day  glucagon  Injectable 1 milliGRAM(s) IntraMuscular once  hydrocortisone hemorrhoidal Suppository 1 Suppository(s) Rectal two times a day  lactobacillus acidophilus 1 Tablet(s) Oral two times a day with meals  melatonin 6 milliGRAM(s) Oral at bedtime  methylPREDNISolone   Oral   metoprolol tartrate 25 milliGRAM(s) Oral two times a day  multivitamin 1 Tablet(s) Oral daily  nystatin Powder 1 Application(s) Topical two times a day  pantoprazole    Tablet 40 milliGRAM(s) Oral before breakfast  psyllium Powder 1 Packet(s) Oral daily  sodium chloride 0.9% lock flush 5 milliLiter(s) IV Push two times a day  zinc oxide 40% Paste 1 Application(s) Topical three times a day    MEDICATIONS  (PRN):  albuterol/ipratropium for Nebulization 3 milliLiter(s) Nebulizer every 6 hours PRN Shortness of Breath and/or Wheezing  ALPRAZolam 0.25 milliGRAM(s) Oral every 6 hours PRN Anxiety  aluminum hydroxide/magnesium hydroxide/simethicone Suspension 30 milliLiter(s) Oral every 4 hours PRN Dyspepsia  benzocaine/menthol Lozenge 1 Lozenge Oral two times a day PRN Sore Throat  benzonatate 100 milliGRAM(s) Oral three times a day PRN Cough  dextrose Oral Gel 15 Gram(s) Oral once PRN Blood Glucose LESS THAN 70 milliGRAM(s)/deciliter  loperamide 2 milliGRAM(s) Oral three times a day PRN Diarrhea  polyethylene glycol 3350 17 Gram(s) Oral daily PRN Constipation  SIMETHICONE SOFTGELS 250 milliGRAM(s) 250 milliGRAM(s) Oral three times a day PRN for gas  sodium chloride 0.65% Nasal 1 Spray(s) Both Nostrils every 8 hours PRN Nasal Congestion    Vital Signs Last 24 Hrs  T(F): 98.5 (08 Nov 2023 08:34), Max: 98.5 (08 Nov 2023 08:34)  HR: 81 (08 Nov 2023 08:34) (69 - 81)  BP: 107/62 (08 Nov 2023 08:34) (107/62 - 130/84)  RR: 16 (08 Nov 2023 08:34) (16 - 16)  SpO2: 97% (08 Nov 2023 08:34) (95% - 97%)  I&O's Summary    PHYSICAL EXAM:  General: NAD, A/O x 3  ENT: No gross hearing impairment, Moist mucous membranes, no thrush  Neck: Supple, No JVD  Lungs: Clear to auscultation bilaterally, good air entry, non-labored breathing  Cardio: RRR, S1/S2, No murmur  Abdomen: Soft, Nontender, Nondistended; Bowel sounds present; obese  Extremities: No calf tenderness, No cyanosis, 2+ pitting edema and lymphedema, bilateral symmetric leg weakness         LABS:                        10.5   14.20 )-----------( 414      ( 06 Nov 2023 10:55 )             33.6     11-06    136  |  101  |  20  ----------------------------<  133  4.1   |  31  |  0.46    Ca    9.3      06 Nov 2023 10:55    TPro  5.4  /  Alb  2.5  /  TBili  1.3  /  DBili  x   /  AST  50  /  ALT  108  /  AlkPhos  327  11-06    09-25 Chol -- LDL -- HDL -- Trig 199 mg/dL    Urinalysis Basic - ( 06 Nov 2023 10:55 )    Color: x / Appearance: x / SG: x / pH: x  Gluc: 133 mg/dL / Ketone: x  / Bili: x / Urobili: x   Blood: x / Protein: x / Nitrite: x   Leuk Esterase: x / RBC: x / WBC x   Sq Epi: x / Non Sq Epi: x / Bacteria: x

## 2023-11-08 NOTE — PROGRESS NOTE ADULT - ASSESSMENT
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 LFTs have been "stable" and alk phos can take much longer to resolve to do longer half-life  -monitor per routine, monitor for any uptrends in numbers    #DVT ppx: Eliquis

## 2023-11-08 NOTE — PROGRESS NOTE ADULT - SUBJECTIVE AND OBJECTIVE BOX
SUBJECTIVE/ROS:  Patient seen and examined, observed in therapy  Collateral hx obtained from partner as well  She slept well.    Reports mild fatigue while doing LE muscle strengthening and wt bearing with both legs  No further episodes of chocking or nasal congestion  Discussed her ENT review yesterday  She confirmed that she didnt want an ENT scope this time, but would consider it, if symptoms recurr  She is happy with continued improvement of resp function,   In the last 2 days, she is only requiring oxy at night      ROS--No chest pain, chills, fever or dyspnea, on NC oxy 1L  ,LBM 11/7    Therapy.  Observed engaging well during therapy on LE muscle strengthening and wt bearing with both legs  Today she was able to tolerate intense therapy without need for oxy and oxy sat remained normal  Marked improvement of resp function  Continued improvement of UE function  Now making improvement with LE motor function     ICU Vital Signs Last 24 Hrs  T(C): 36.9 (08 Nov 2023 08:34), Max: 36.9 (08 Nov 2023 08:34)  T(F): 98.5 (08 Nov 2023 08:34), Max: 98.5 (08 Nov 2023 08:34)  HR: 81 (08 Nov 2023 08:34) (69 - 81)  BP: 107/62 (08 Nov 2023 08:34) (107/62 - 130/84)  RR: 16 (08 Nov 2023 08:34) (16 - 16)  SpO2: 97% (08 Nov 2023 08:34) (95% - 97%)  O2 Parameters below as of 08 Nov 2023 08:34  Patient On (Oxygen Delivery Method): nasal cannula  O2 Flow (L/min): 1    PHYSICAL EXAM:  Gen -  NAD, Comfortable, no using oxy at bed side and during therapy, oxy sat >97% R/A  Neck - Supple, No limited ROM, O2 via NC  Pulm - clear  Cardiovascular - warm and well perfused, no cyanosis  Abdomen - Soft, non tender   Extremities - edema to b/l LE, no calf tenderness, LUE Med line    Neuro-     Cognitive - awake, alert, fully oriented, engaging, speech normal      Motor -                    LEFT    UE - 4/5                    RIGHT UE - 4/5                     LEFT    LE - 3+/5, distally and 3/5 proximal                    RIGHT LE - 3+/5      Sensory - Intact     MSK: improvement with motor strenght  Psychiatric - Mood stable, Affect WNL  Skin (unable to assess while sitting in recliner): previous exam: Left lower abdomen ruptured blister 3.0 x 1.0, stage 2 pressure injury to right buttock 4 x1 and left buttock 3x1, stage 2 pressure injury ti right inner thigh 0.2 x 0.2, right groin wound 10.5 x 2.0, Left groin wound 5 x 5    MMT--Able to contract B/L upper back muscles, EF/EE 3+/5    RECENT LABS:                          10.5   14.20 )-----------( 414      ( 06 Nov 2023 10:55 )             33.6     11-06    136  |  101  |  20  ----------------------------<  133<H>  4.1   |  31  |  0.46<L>    Ca    9.3      06 Nov 2023 10:55    TPro  5.4<L>  /  Alb  2.5<L>  /  TBili  1.3<H>  /  DBili  x   /  AST  50<H>  /  ALT  108<H>  /  AlkPhos  327<H>  11-06    LIVER FUNCTIONS - ( 06 Nov 2023 10:55 )  Alb: 2.5 g/dL / Pro: 5.4 g/dL / ALK PHOS: 327 U/L / ALT: 108 U/L / AST: 50 U/L / GGT: x           Allergies  No Known Allergies    MEDICATIONS  (STANDING):  ammonium lactate 12% Lotion 1 Application(s) Topical every 12 hours  apixaban 5 milliGRAM(s) Oral every 12 hours  artificial  tears Solution 1 Drop(s) Both EYES every 12 hours  ascorbic acid 500 milliGRAM(s) Oral daily  atovaquone  Suspension 1500 milliGRAM(s) Oral daily  bisacodyl 5 milliGRAM(s) Oral <User Schedule>  dextrose 5%. 1000 milliLiter(s) (50 mL/Hr) IV Continuous <Continuous>  dextrose 5%. 1000 milliLiter(s) (100 mL/Hr) IV Continuous <Continuous>  dextrose 50% Injectable 12.5 Gram(s) IV Push once  dextrose 50% Injectable 25 Gram(s) IV Push once  dextrose 50% Injectable 25 Gram(s) IV Push once  folic acid 1 milliGRAM(s) Oral daily  gabapentin 300 milliGRAM(s) Oral two times a day  glucagon  Injectable 1 milliGRAM(s) IntraMuscular once  hydrocortisone hemorrhoidal Suppository 1 Suppository(s) Rectal two times a day  lactobacillus acidophilus 1 Tablet(s) Oral two times a day with meals  melatonin 6 milliGRAM(s) Oral at bedtime  methylPREDNISolone   Oral   metoprolol tartrate 25 milliGRAM(s) Oral two times a day  multivitamin 1 Tablet(s) Oral daily  nystatin Powder 1 Application(s) Topical two times a day  pantoprazole    Tablet 40 milliGRAM(s) Oral before breakfast  psyllium Powder 1 Packet(s) Oral daily  sodium chloride 0.9% lock flush 5 milliLiter(s) IV Push two times a day  zinc oxide 40% Paste 1 Application(s) Topical three times a day    MEDICATIONS  (PRN):  albuterol/ipratropium for Nebulization 3 milliLiter(s) Nebulizer every 6 hours PRN Shortness of Breath and/or Wheezing  ALPRAZolam 0.25 milliGRAM(s) Oral every 6 hours PRN Anxiety  aluminum hydroxide/magnesium hydroxide/simethicone Suspension 30 milliLiter(s) Oral every 4 hours PRN Dyspepsia  benzocaine/menthol Lozenge 1 Lozenge Oral two times a day PRN Sore Throat  benzonatate 100 milliGRAM(s) Oral three times a day PRN Cough  dextrose Oral Gel 15 Gram(s) Oral once PRN Blood Glucose LESS THAN 70 milliGRAM(s)/deciliter  loperamide 2 milliGRAM(s) Oral three times a day PRN Diarrhea  polyethylene glycol 3350 17 Gram(s) Oral daily PRN Constipation  SIMETHICONE SOFTGELS 250 milliGRAM(s) 250 milliGRAM(s) Oral three times a day PRN for gas  sodium chloride 0.65% Nasal 1 Spray(s) Both Nostrils every 8 hours PRN Nasal Congestion

## 2023-11-08 NOTE — PROGRESS NOTE ADULT - ASSESSMENT
Assessment/Plan:  ALIZA FOLEY is a 62 year old female with PMH of pAFIB and sciatica; who presented to [facility] ED with SOB, and was found to have groundglass opacities and interstitial lung disease. She was treated with empiric Vancomycin and Zosyn. She underwent a bronchoscopy with bronchoalveolar lavage and pulse steroids. She was given IV diuretics for increased pulmonary congestion, but her respiratory status continued to worsen.  On 9/13, she was transferred to [facility] for ECMO requirements. She was further treated with Plasmapheresis, Rituximab and high-dose steroids, with significant improvement. Her overall hospital course was complicated by thrombocytopenia (s/p several platelet transfusions), leukocytosis (infectious workup entirely negative- s/p Vanco and Ceftriaxone), AFIB with RVR (resolved with Metoprolol), dysphagia (requiring NGT), transaminitis (secondary to acute illness and hypotension),  non-occlusive RIJ DVT (started on Lovenox). Patient now admitted for a multidisciplinary rehab program. 10-05-23 @ 13:18    * ENT review and recs apprecaited   * Pulmonary f/u appreciated, continued improvement of resp function, today, able to perform therapy without oxy requirement  * Mainly requiring oxy nightly, -- Continue tapering oxygen,  *  Due Repeat CT chest 11/11    #Interstitial Lung Disease and Mixed connective tissue disease  - Gait Instability, ADL impairments and Functional impairments: start Comprehensive Rehab Program of PT/OT/SLP - 3 hours a day, 5 days a week  - P&O as needed   - Non-specific Interstitial Lung Disease  - Requiring ECMO   - Improvement s/p Plasmapheresis, Rituximab and high- dose steroids   - Albuterol/Ipratropium Nebulizer every 6 hours  - Mepron 1500mg daily  - PO Medrol ( due to liver dysfunction): Plan will be to taper by ~20% every 2 weeks    Medrol   64mg x 2 weeks   56mg x 2 weeks  44mg x 2 weeks   36mg x 2 weeks - Follow up Outpatient   -- Repeat CT Chest in 6 weeks   --Pulmonary team--f/u with patient and clarify when she can get immunizations    -Perform BL elbow flexion & elbow extension with antigravity in therapy  - Noted to have missed 5 days between steroid taper dosing- resumed at 56mg on 10/26- pulm recommends to continue as originally instructed   - Plan to wean 02 as tolerated, Continue tapering oxygen,  - Repeat CT Chest on 11/11     * Posttnasal drip--ENT review 11/7, declined scope, commenced flonase as reocmmended     #pAFIB/Rt Ij thrombus  - Metoprolol 25mg BID and lovenox  --- Eliquis 5mg BID - tolerating well     # Chronic Tinnitus   - ENT review and recs appreciate, Patient already made ENT appointment for f/u    # Lymphedema both legs -chronic and Rt IJ thrombus  - ACE Wrap BL LEs  - BL LE doppler negative for DVT  - BL UE doppler with chronic thrombotic changes in RIJ     #Transaminitis --LFT Down trending   - Secondary to acute illness and hypotension at [facility]  - Outpatient follow up     #Neuropathy  - Gabapentin 300 mg BID, will consider increasing dose  --Work on motor strengthening, priority for UE as preferred by patient   - Vit b12 >2,000 in 9/2023    #Sleep/Mood  - Melatonin 6mg at HS  - Psych rec Xanax 0.25mg PRN    #Skin  - Skin: Left lower abdomen ruptured blister 3.0 x 1.0, stage 2 pressure injury to right buttock 4 x1 and left buttock 3x1, stage 2 pressure injury ti right inner thigh 0.2 x 0.2, right groin wound 10.5 x 2.0, Left groin wound 5 x 5,  right neck scab wound  -- MAD to skin folds- Nystatin to submammary and abdominal pannus BID  -- MAD to L groin- cleanse with NS, Triad cream daily  -- Mad to R groin- Nystatin powder daily   -- R groin wound-- aquacel packing, Triad to periwound, Allevyn foam- daily and PRN   - Pressure injury/Skin: OOB to Chair, PT/OT   - Offload pressure, low air loss support surfaces, T&P every 2 hours   --Wound care consult-10/9 -Multiple wounds--perineal and buttock/sacral, recs appreciated   --Suggest Continue treatments as below:   Twice daily & PRN Normal Saline Cleanse of abdominal pannus & breast folds, perineal, Left & Right inner buttock erosions.  Pat thoroughly dry.   Apply Desitin generously twice daily (& PRN) to perineal, buttocks, and left groin erosions, leave open to air.    Apply Nystatin powder to abdominal pannus and breast folds.  Suggest use of Interdry cloths in folds to 'wick' moisture.  Suggest daily Normal Saline Cleanse of right groin surgical wound, pat dry.  Apply Cavillon film barrier to intact periwound skin.  Place Aquacell strip over open area, cover with foam dressing.  - Wound care following     #Pain Mgmt   - Tylenol PRN   - Gabapentin 300mg at HS     #GI/Bowel Mgmt   - Pantoprazole  - Senna  --on hold due to recent Loose BM  - PRN: Maalox   - Stool count  --Lactobacillus BID   -- AB XR mod stool burden on transverse colon 10/23--still has mod stool burden, but moving bowel appropriately  - S/p enema and lactulose    # Alternating bowel function --continue probiotic, lactobacillus   * Leucocytosis probably steroid related and partly due to her Mixed Connective Tissue Disease, will continue monitoring     #/Bladder Mgmt   -Spontaneously voiding   - UA (11/3) negative    #FEN   #Dysphagia  - Diet - Minced & Moist  [CC]    - Dysphaiga- SLP evaluation     #Health maintenance  - Folic Acid   - MVI  - Ocean nasal spray    # Difficulty with access to peripheral veins-- Blood draws from Left arm Medline     #Precautions / PROPHYLAXIS:   - Falls  - ortho: Weight bearing status: WBAT   - Lungs: Aspiration, Incentive Spirometer   - DVT PPX: Eliquis 5 mg BID   ---------------------------  IDT conference on 11/7  Social Work: Await peer to peer with CM. Provided private hire list. Lives with spouse in [place] 6 months of year. DC to apt in [place] with elevator, no steps. Supportive spouse.   Function--Min assistance upper body dressing and upper body grooming  Total assistance with transfers, sitting balance significantly improved, now independent  Able to eat with both hands and comb hair unassisted  Ambulation--Max Assist with light gate x 4 persons  Barrier--Requirement for max assistance most activities, still still 02 dependent  DME--Wheel chair  Est dc home vs Subacute Rehab 11/14 (revised dc date in view of sustained improvement, especially with upper extremity function)    OUTPATIENT/FOLLOW UP:    Trae Whitney  Pulmonary Disease  [address], [address] [place] [address]  Phone: [phone]  Fax: [phone]  Follow Up Time: 2 weeks    Ryanne Allen  Rheumatology  [address] [address]  Phone: [phone]  Fax: [phone]  Follow Up Time: 1 week    Kyle Pierce  Internal Medicine  [address] [place] [address], [address]  [place] [address]  Phone: [phone]  Fax: [phone]  Follow Up Time: 2 weeks    Addy Powers  Pulmonary Disease  410 [facility], [facility] [address]  [place], [place] [address]  Phone: [phone]  Fax: [phone]  Follow Up Time:     Kwame Hawley  Critical Care Medicine  410 [facility], [address] [address]  Phone: [phone]  Fax: [phone]  Follow Up Time:     Neena Borrego  Rheumatology  [address] [place] [address], [address]  [place] [address]  Phone: [phone]  Fax: [phone]  Follow Up Time:    [facility]  [facility] [address]  Scheduled Appointment: 09/13/2023  2:40 PM  ---------------------------  Non critical care  Time based billing   spent 63 mins, review, observation in therapy, discussion with family, care co ordination and d/c planning  Assessment/Plan:  ALIZA FOLEY is a 62 year old female with PMH of pAFIB and sciatica; who presented to [facility] ED with SOB, and was found to have groundglass opacities and interstitial lung disease. She was treated with empiric Vancomycin and Zosyn. She underwent a bronchoscopy with bronchoalveolar lavage and pulse steroids. She was given IV diuretics for increased pulmonary congestion, but her respiratory status continued to worsen.  On 9/13, she was transferred [facility] for [facility]O requirements. She was further treated with Plasmapheresis, Rituximab and high-dose steroids, with significant improvement. Her overall hospital course was complicated by thrombocytopenia (s/p several platelet transfusions), leukocytosis (infectious workup entirely negative- s/p Vanco and Ceftriaxone), AFIB with RVR (resolved with Metoprolol), dysphagia (requiring NGT), transaminitis (secondary to acute illness and hypotension),  non-occlusive RIJ DVT (started on Lovenox). Patient now admitted for a multidisciplinary rehab program. 10-05-23 @ 13:18    * ENT review and recs apprecaited   * Pulmonary f/u appreciated, continued improvement of resp function, today, able to perform therapy without oxy requirement  * Mainly requiring oxy nightly, -- Continue tapering oxygen,  *  Due Repeat CT chest 11/11    #Interstitial Lung Disease and Mixed connective tissue disease  - Gait Instability, ADL impairments and Functional impairments: start Comprehensive Rehab Program of PT/OT/SLP - 3 hours a day, 5 days a week  - P&O as needed   - Non-specific Interstitial Lung Disease  - Requiring ECMO   - Improvement s/p Plasmapheresis, Rituximab and high- dose steroids   - Albuterol/Ipratropium Nebulizer every 6 hours  - Mepron 1500mg daily  - PO Medrol ( due to liver dysfunction): Plan will be to taper by ~20% every 2 weeks    Medrol   64mg x 2 weeks   56mg x 2 weeks  44mg x 2 weeks   36mg x 2 weeks - Follow up Outpatient   -- Repeat CT Chest in 6 weeks   --Pulmonary team--f/u with patient and clarify when she can get immunizations    -Perform BL elbow flexion & elbow extension with antigravity in therapy  - Noted to have missed 5 days between steroid taper dosing- resumed at 56mg on 10/26- pulm recommends to continue as originally instructed   - Plan to wean 02 as tolerated, Continue tapering oxygen,  - Repeat CT Chest on 11/11     * Posttnasal drip--ENT review 11/7, declined scope, commenced flonase as reocmmended     #pAFIB/Rt Ij thrombus  - Metoprolol 25mg BID and lovenox  --- Eliquis 5mg BID - tolerating well     # Chronic Tinnitus   - ENT review and recs appreciate, Patient already made ENT appointment for f/u    # Lymphedema both legs -chronic and Rt IJ thrombus  - ACE Wrap BL LEs  - BL LE doppler negative for DVT  - BL UE doppler with chronic thrombotic changes in RIJ     #Transaminitis --LFT Down trending   - Secondary to acute illness and hypotension at [facility]  - Outpatient follow up     #Neuropathy  - Gabapentin 300 mg BID, will consider increasing dose  --Work on motor strengthening, priority for UE as preferred by patient   - Vit b12 >2,000 in 9/2023    #Sleep/Mood  - Melatonin 6mg at HS  - Psych rec Xanax 0.25mg PRN    #Skin  - Skin: Left lower abdomen ruptured blister 3.0 x 1.0, stage 2 pressure injury to right buttock 4 x1 and left buttock 3x1, stage 2 pressure injury ti right inner thigh 0.2 x 0.2, right groin wound 10.5 x 2.0, Left groin wound 5 x 5,  right neck scab wound  -- MAD to skin folds- Nystatin to submammary and abdominal pannus BID  -- MAD to L groin- cleanse with NS, Triad cream daily  -- Mad to R groin- Nystatin powder daily   -- R groin wound-- aquacel packing, Triad to periwound, Allevyn foam- daily and PRN   - Pressure injury/Skin: OOB to Chair, PT/OT   - Offload pressure, low air loss support surfaces, T&P every 2 hours   --Wound care consult-10/9 -Multiple wounds--perineal and buttock/sacral, recs appreciated   --Suggest Continue treatments as below:   Twice daily & PRN Normal Saline Cleanse of abdominal pannus & breast folds, perineal, Left & Right inner buttock erosions.  Pat thoroughly dry.   Apply Desitin generously twice daily (& PRN) to perineal, buttocks, and left groin erosions, leave open to air.    Apply Nystatin powder to abdominal pannus and breast folds.  Suggest use of Interdry cloths in folds to 'wick' moisture.  Suggest daily Normal Saline Cleanse of right groin surgical wound, pat dry.  Apply Cavillon film barrier to intact periwound skin.  Place Aquacell strip over open area, cover with foam dressing.  - Wound care following     #Pain Mgmt   - Tylenol PRN   - Gabapentin 300mg at HS     #GI/Bowel Mgmt   - Pantoprazole  - Senna  --on hold due to recent Loose BM  - PRN: Maalox   - Stool count  --Lactobacillus BID   -- AB XR mod stool burden on transverse colon 10/23--still has mod stool burden, but moving bowel appropriately  - S/p enema and lactulose    # Alternating bowel function --continue probiotic, lactobacillus   * Leucocytosis probably steroid related and partly due to her Mixed Connective Tissue Disease, will continue monitoring     #/Bladder Mgmt   -Spontaneously voiding   - UA (11/3) negative    #FEN   #Dysphagia  - Diet - Minced & Moist  [CC]    - Dysphaiga- SLP evaluation     #Health maintenance  - Folic Acid   - MVI  - Ocean nasal spray    # Difficulty with access to peripheral veins-- Blood draws from Left arm Medline     #Precautions / PROPHYLAXIS:   - Falls  - ortho: Weight bearing status: WBAT   - Lungs: Aspiration, Incentive Spirometer   - DVT PPX: Eliquis 5 mg BID   ---------------------------  IDT conference on 11/7  Social Work: Await peer to peer with CM. Provided private hire list. Lives with spouse in [place] 6 months of year. DC to apt in [place] with elevator, no steps. Supportive spouse.   Function--Min assistance upper body dressing and upper body grooming  Total assistance with transfers, sitting balance significantly improved, now independent  Able to eat with both hands and comb hair unassisted  Ambulation--Max Assist with light gate x 4 persons  Barrier--Requirement for max assistance most activities, still still 02 dependent  DME--Wheel chair  Est dc home vs Subacute Rehab 11/14 (revised dc date in view of sustained improvement, especially with upper extremity function)    OUTPATIENT/FOLLOW UP:    Trae Whitney  Pulmonary Disease  [address], [address] [place] [address]  Phone: [phone]  Fax: [phone]  Follow Up Time: 2 weeks    Ryanne Allen  Rheumatology  [address] [address]  Phone: [phone]  Fax: [phone]  Follow Up Time: 1 week    Kyle Pierce  Internal Medicine  [address] [place] [address], [address]  [place] [address]  Phone: [phone]  Fax: [phone]  Follow Up Time: 2 weeks    Addy Powers  Pulmonary Disease  410 [facility], [facility] [address]  [place], [place] [address]  Phone: [phone]  Fax: [phone]  Follow Up Time:     Kwame Hawley  Critical Care Medicine  410 [facility], [address] [address]  Phone: [phone]  Fax: [phone]  Follow Up Time:     Neena Borrego  Rheumatology  [address] [place] [address], [address]  [place] [address]  Phone: [phone]  Fax: [phone]  Follow Up Time:    [facility]  [facility] [address]  Scheduled Appointment: 09/13/2023  2:40 PM  ---------------------------  Non critical care  Time based billing   spent 63 mins, review, observation in therapy, discussion with partner, care co ordination and d/c planning

## 2023-11-09 LAB
ALBUMIN SERPL ELPH-MCNC: 2.6 G/DL — LOW (ref 3.3–5)
ALBUMIN SERPL ELPH-MCNC: 2.6 G/DL — LOW (ref 3.3–5)
ALP SERPL-CCNC: 263 U/L — HIGH (ref 40–120)
ALP SERPL-CCNC: 263 U/L — HIGH (ref 40–120)
ALT FLD-CCNC: 80 U/L — HIGH (ref 10–45)
ALT FLD-CCNC: 80 U/L — HIGH (ref 10–45)
ANION GAP SERPL CALC-SCNC: 6 MMOL/L — SIGNIFICANT CHANGE UP (ref 5–17)
ANION GAP SERPL CALC-SCNC: 6 MMOL/L — SIGNIFICANT CHANGE UP (ref 5–17)
AST SERPL-CCNC: 33 U/L — SIGNIFICANT CHANGE UP (ref 10–40)
AST SERPL-CCNC: 33 U/L — SIGNIFICANT CHANGE UP (ref 10–40)
BASOPHILS # BLD AUTO: 0.03 K/UL — SIGNIFICANT CHANGE UP (ref 0–0.2)
BASOPHILS # BLD AUTO: 0.03 K/UL — SIGNIFICANT CHANGE UP (ref 0–0.2)
BASOPHILS NFR BLD AUTO: 0.3 % — SIGNIFICANT CHANGE UP (ref 0–2)
BASOPHILS NFR BLD AUTO: 0.3 % — SIGNIFICANT CHANGE UP (ref 0–2)
BILIRUB SERPL-MCNC: 1.1 MG/DL — SIGNIFICANT CHANGE UP (ref 0.2–1.2)
BILIRUB SERPL-MCNC: 1.1 MG/DL — SIGNIFICANT CHANGE UP (ref 0.2–1.2)
BUN SERPL-MCNC: 21 MG/DL — SIGNIFICANT CHANGE UP (ref 7–23)
BUN SERPL-MCNC: 21 MG/DL — SIGNIFICANT CHANGE UP (ref 7–23)
CALCIUM SERPL-MCNC: 9.4 MG/DL — SIGNIFICANT CHANGE UP (ref 8.4–10.5)
CALCIUM SERPL-MCNC: 9.4 MG/DL — SIGNIFICANT CHANGE UP (ref 8.4–10.5)
CHLORIDE SERPL-SCNC: 102 MMOL/L — SIGNIFICANT CHANGE UP (ref 96–108)
CHLORIDE SERPL-SCNC: 102 MMOL/L — SIGNIFICANT CHANGE UP (ref 96–108)
CO2 SERPL-SCNC: 30 MMOL/L — SIGNIFICANT CHANGE UP (ref 22–31)
CO2 SERPL-SCNC: 30 MMOL/L — SIGNIFICANT CHANGE UP (ref 22–31)
CREAT SERPL-MCNC: 0.46 MG/DL — LOW (ref 0.5–1.3)
CREAT SERPL-MCNC: 0.46 MG/DL — LOW (ref 0.5–1.3)
EGFR: 107 ML/MIN/1.73M2 — SIGNIFICANT CHANGE UP
EGFR: 107 ML/MIN/1.73M2 — SIGNIFICANT CHANGE UP
EOSINOPHIL # BLD AUTO: 0.01 K/UL — SIGNIFICANT CHANGE UP (ref 0–0.5)
EOSINOPHIL # BLD AUTO: 0.01 K/UL — SIGNIFICANT CHANGE UP (ref 0–0.5)
EOSINOPHIL NFR BLD AUTO: 0.1 % — SIGNIFICANT CHANGE UP (ref 0–6)
EOSINOPHIL NFR BLD AUTO: 0.1 % — SIGNIFICANT CHANGE UP (ref 0–6)
GLUCOSE SERPL-MCNC: 121 MG/DL — HIGH (ref 70–99)
GLUCOSE SERPL-MCNC: 121 MG/DL — HIGH (ref 70–99)
HCT VFR BLD CALC: 33 % — LOW (ref 34.5–45)
HCT VFR BLD CALC: 33 % — LOW (ref 34.5–45)
HGB BLD-MCNC: 10.2 G/DL — LOW (ref 11.5–15.5)
HGB BLD-MCNC: 10.2 G/DL — LOW (ref 11.5–15.5)
IMM GRANULOCYTES NFR BLD AUTO: 2.5 % — HIGH (ref 0–0.9)
IMM GRANULOCYTES NFR BLD AUTO: 2.5 % — HIGH (ref 0–0.9)
LYMPHOCYTES # BLD AUTO: 0.99 K/UL — LOW (ref 1–3.3)
LYMPHOCYTES # BLD AUTO: 0.99 K/UL — LOW (ref 1–3.3)
LYMPHOCYTES # BLD AUTO: 8.8 % — LOW (ref 13–44)
LYMPHOCYTES # BLD AUTO: 8.8 % — LOW (ref 13–44)
MCHC RBC-ENTMCNC: 29.6 PG — SIGNIFICANT CHANGE UP (ref 27–34)
MCHC RBC-ENTMCNC: 29.6 PG — SIGNIFICANT CHANGE UP (ref 27–34)
MCHC RBC-ENTMCNC: 30.9 GM/DL — LOW (ref 32–36)
MCHC RBC-ENTMCNC: 30.9 GM/DL — LOW (ref 32–36)
MCV RBC AUTO: 95.7 FL — SIGNIFICANT CHANGE UP (ref 80–100)
MCV RBC AUTO: 95.7 FL — SIGNIFICANT CHANGE UP (ref 80–100)
MONOCYTES # BLD AUTO: 1.16 K/UL — HIGH (ref 0–0.9)
MONOCYTES # BLD AUTO: 1.16 K/UL — HIGH (ref 0–0.9)
MONOCYTES NFR BLD AUTO: 10.3 % — SIGNIFICANT CHANGE UP (ref 2–14)
MONOCYTES NFR BLD AUTO: 10.3 % — SIGNIFICANT CHANGE UP (ref 2–14)
NEUTROPHILS # BLD AUTO: 8.83 K/UL — HIGH (ref 1.8–7.4)
NEUTROPHILS # BLD AUTO: 8.83 K/UL — HIGH (ref 1.8–7.4)
NEUTROPHILS NFR BLD AUTO: 78 % — HIGH (ref 43–77)
NEUTROPHILS NFR BLD AUTO: 78 % — HIGH (ref 43–77)
NRBC # BLD: 0 /100 WBCS — SIGNIFICANT CHANGE UP (ref 0–0)
NRBC # BLD: 0 /100 WBCS — SIGNIFICANT CHANGE UP (ref 0–0)
PLATELET # BLD AUTO: 438 K/UL — HIGH (ref 150–400)
PLATELET # BLD AUTO: 438 K/UL — HIGH (ref 150–400)
POTASSIUM SERPL-MCNC: 3.4 MMOL/L — LOW (ref 3.5–5.3)
POTASSIUM SERPL-MCNC: 3.4 MMOL/L — LOW (ref 3.5–5.3)
POTASSIUM SERPL-SCNC: 3.4 MMOL/L — LOW (ref 3.5–5.3)
POTASSIUM SERPL-SCNC: 3.4 MMOL/L — LOW (ref 3.5–5.3)
PROT SERPL-MCNC: 5.3 G/DL — LOW (ref 6–8.3)
PROT SERPL-MCNC: 5.3 G/DL — LOW (ref 6–8.3)
RBC # BLD: 3.45 M/UL — LOW (ref 3.8–5.2)
RBC # BLD: 3.45 M/UL — LOW (ref 3.8–5.2)
RBC # FLD: 15.5 % — HIGH (ref 10.3–14.5)
RBC # FLD: 15.5 % — HIGH (ref 10.3–14.5)
SODIUM SERPL-SCNC: 138 MMOL/L — SIGNIFICANT CHANGE UP (ref 135–145)
SODIUM SERPL-SCNC: 138 MMOL/L — SIGNIFICANT CHANGE UP (ref 135–145)
WBC # BLD: 11.3 K/UL — HIGH (ref 3.8–10.5)
WBC # BLD: 11.3 K/UL — HIGH (ref 3.8–10.5)
WBC # FLD AUTO: 11.3 K/UL — HIGH (ref 3.8–10.5)
WBC # FLD AUTO: 11.3 K/UL — HIGH (ref 3.8–10.5)

## 2023-11-09 PROCEDURE — 99232 SBSQ HOSP IP/OBS MODERATE 35: CPT

## 2023-11-09 RX ORDER — PHENYLEPHRINE-SHARK LIVER OIL-MINERAL OIL-PETROLATUM RECTAL OINTMENT
1 OINTMENT (GRAM) RECTAL
Refills: 0 | Status: ACTIVE | OUTPATIENT
Start: 2023-11-09 | End: 2024-10-07

## 2023-11-09 RX ORDER — POTASSIUM CHLORIDE 20 MEQ
40 PACKET (EA) ORAL ONCE
Refills: 0 | Status: DISCONTINUED | OUTPATIENT
Start: 2023-11-09 | End: 2023-11-09

## 2023-11-09 RX ADMIN — Medication 1 DROP(S): at 09:03

## 2023-11-09 RX ADMIN — ZINC OXIDE 1 APPLICATION(S): 200 OINTMENT TOPICAL at 08:14

## 2023-11-09 RX ADMIN — ZINC OXIDE 1 APPLICATION(S): 200 OINTMENT TOPICAL at 21:27

## 2023-11-09 RX ADMIN — Medication 1 APPLICATION(S): at 08:13

## 2023-11-09 RX ADMIN — Medication 25 MILLIGRAM(S): at 21:27

## 2023-11-09 RX ADMIN — Medication 6 MILLIGRAM(S): at 21:26

## 2023-11-09 RX ADMIN — GABAPENTIN 300 MILLIGRAM(S): 400 CAPSULE ORAL at 09:01

## 2023-11-09 RX ADMIN — Medication 1 APPLICATION(S): at 16:24

## 2023-11-09 RX ADMIN — PANTOPRAZOLE SODIUM 40 MILLIGRAM(S): 20 TABLET, DELAYED RELEASE ORAL at 09:03

## 2023-11-09 RX ADMIN — GABAPENTIN 300 MILLIGRAM(S): 400 CAPSULE ORAL at 16:27

## 2023-11-09 RX ADMIN — ZINC OXIDE 1 APPLICATION(S): 200 OINTMENT TOPICAL at 16:22

## 2023-11-09 RX ADMIN — Medication 1 DROP(S): at 21:25

## 2023-11-09 RX ADMIN — Medication 1 TABLET(S): at 09:01

## 2023-11-09 RX ADMIN — NYSTATIN CREAM 1 APPLICATION(S): 100000 CREAM TOPICAL at 08:13

## 2023-11-09 RX ADMIN — Medication 1 TABLET(S): at 16:27

## 2023-11-09 RX ADMIN — SODIUM CHLORIDE 5 MILLILITER(S): 9 INJECTION INTRAMUSCULAR; INTRAVENOUS; SUBCUTANEOUS at 16:22

## 2023-11-09 RX ADMIN — APIXABAN 5 MILLIGRAM(S): 2.5 TABLET, FILM COATED ORAL at 09:03

## 2023-11-09 RX ADMIN — ATOVAQUONE 1500 MILLIGRAM(S): 750 SUSPENSION ORAL at 16:27

## 2023-11-09 RX ADMIN — APIXABAN 5 MILLIGRAM(S): 2.5 TABLET, FILM COATED ORAL at 21:24

## 2023-11-09 RX ADMIN — NYSTATIN CREAM 1 APPLICATION(S): 100000 CREAM TOPICAL at 16:24

## 2023-11-09 RX ADMIN — Medication 1 TABLET(S): at 16:29

## 2023-11-09 RX ADMIN — Medication 1 MILLIGRAM(S): at 16:28

## 2023-11-09 RX ADMIN — SODIUM CHLORIDE 5 MILLILITER(S): 9 INJECTION INTRAMUSCULAR; INTRAVENOUS; SUBCUTANEOUS at 08:14

## 2023-11-09 RX ADMIN — PHENYLEPHRINE-SHARK LIVER OIL-MINERAL OIL-PETROLATUM RECTAL OINTMENT 1 APPLICATION(S): at 21:28

## 2023-11-09 RX ADMIN — Medication 44 MILLIGRAM(S): at 09:03

## 2023-11-09 RX ADMIN — Medication 500 MILLIGRAM(S): at 16:28

## 2023-11-09 NOTE — PROGRESS NOTE ADULT - SUBJECTIVE AND OBJECTIVE BOX
Time of Visit:  Patient seen and examined. pat is lying in bed comfortable     MEDICATIONS  (STANDING):  ammonium lactate 12% Lotion 1 Application(s) Topical every 12 hours  apixaban 5 milliGRAM(s) Oral every 12 hours  artificial  tears Solution 1 Drop(s) Both EYES every 12 hours  ascorbic acid 500 milliGRAM(s) Oral daily  atovaquone  Suspension 1500 milliGRAM(s) Oral daily  bisacodyl 5 milliGRAM(s) Oral <User Schedule>  dextrose 5%. 1000 milliLiter(s) (100 mL/Hr) IV Continuous <Continuous>  dextrose 5%. 1000 milliLiter(s) (50 mL/Hr) IV Continuous <Continuous>  dextrose 50% Injectable 12.5 Gram(s) IV Push once  dextrose 50% Injectable 25 Gram(s) IV Push once  dextrose 50% Injectable 25 Gram(s) IV Push once  folic acid 1 milliGRAM(s) Oral daily  gabapentin 300 milliGRAM(s) Oral two times a day  glucagon  Injectable 1 milliGRAM(s) IntraMuscular once  hemorrhoidal Ointment 1 Application(s) Rectal four times a day  hydrocortisone hemorrhoidal Suppository 1 Suppository(s) Rectal two times a day  lactobacillus acidophilus 1 Tablet(s) Oral two times a day with meals  melatonin 6 milliGRAM(s) Oral at bedtime  methylPREDNISolone   Oral   methylPREDNISolone 44 milliGRAM(s) Oral daily  metoprolol tartrate 25 milliGRAM(s) Oral two times a day  multivitamin 1 Tablet(s) Oral daily  nystatin Powder 1 Application(s) Topical two times a day  pantoprazole    Tablet 40 milliGRAM(s) Oral before breakfast  psyllium Powder 1 Packet(s) Oral daily  sodium chloride 0.9% lock flush 5 milliLiter(s) IV Push two times a day  zinc oxide 40% Paste 1 Application(s) Topical three times a day      MEDICATIONS  (PRN):  albuterol/ipratropium for Nebulization 3 milliLiter(s) Nebulizer every 6 hours PRN Shortness of Breath and/or Wheezing  ALPRAZolam 0.25 milliGRAM(s) Oral every 6 hours PRN Anxiety  aluminum hydroxide/magnesium hydroxide/simethicone Suspension 30 milliLiter(s) Oral every 4 hours PRN Dyspepsia  benzocaine/menthol Lozenge 1 Lozenge Oral two times a day PRN Sore Throat  benzonatate 100 milliGRAM(s) Oral three times a day PRN Cough  dextrose Oral Gel 15 Gram(s) Oral once PRN Blood Glucose LESS THAN 70 milliGRAM(s)/deciliter  loperamide 2 milliGRAM(s) Oral three times a day PRN Diarrhea  polyethylene glycol 3350 17 Gram(s) Oral daily PRN Constipation  SIMETHICONE SOFTGELS 250 milliGRAM(s) 250 milliGRAM(s) Oral three times a day PRN for gas  sodium chloride 0.65% Nasal 1 Spray(s) Both Nostrils every 8 hours PRN Nasal Congestion       Medications up to date at time of exam.      PHYSICAL EXAMINATION:  Patient has no new complaints.  GENERAL: The patient is a well-developed, well-nourished, in no apparent distress.     Vital Signs Last 24 Hrs  T(C): 36.7 (09 Nov 2023 09:17), Max: 37.1 (08 Nov 2023 20:04)  T(F): 98 (09 Nov 2023 09:17), Max: 98.8 (08 Nov 2023 20:04)  HR: 60 (09 Nov 2023 09:17) (60 - 85)  BP: 100/61 (09 Nov 2023 09:17) (100/61 - 109/71)  BP(mean): --  RR: 16 (09 Nov 2023 09:17) (16 - 16)  SpO2: 96% (09 Nov 2023 09:17) (94% - 96%)    Parameters below as of 09 Nov 2023 09:17  Patient On (Oxygen Delivery Method): room air  O2 Flow (L/min): 1     (if applicable)    Chest Tube (if applicable)    HEENT: Head is normocephalic and atraumatic. Extraocular muscles are intact. Mucous membranes are moist.     NECK: Supple, no palpable adenopathy.    LUNGS: Clear to auscultation, no wheezing, rales, or rhonchi.    HEART: Regular rate and rhythm without murmur.    ABDOMEN: Soft, nontender, and nondistended.  No hepatosplenomegaly is noted.    : No painful voiding, no pelvic pain    EXTREMITIES: Without any cyanosis, clubbing, rash, lesions or edema.    NEUROLOGIC: Awake, alert, motor 3+/5 x4     SKIN: Warm, dry, good turgor.      LABS:                        10.2   11.30 )-----------( 438      ( 09 Nov 2023 10:00 )             33.0     11-09    138  |  102  |  21  ----------------------------<  121<H>  3.4<L>   |  30  |  0.46<L>    Ca    9.4      09 Nov 2023 10:00    TPro  5.3<L>  /  Alb  2.6<L>  /  TBili  1.1  /  DBili  x   /  AST  33  /  ALT  80<H>  /  AlkPhos  263<H>  11-09      Urinalysis Basic - ( 09 Nov 2023 10:00 )    Color: x / Appearance: x / SG: x / pH: x  Gluc: 121 mg/dL / Ketone: x  / Bili: x / Urobili: x   Blood: x / Protein: x / Nitrite: x   Leuk Esterase: x / RBC: x / WBC x   Sq Epi: x / Non Sq Epi: x / Bacteria: x        IMP: This is a 62 yr old woman  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 with acute hypoxemic respiratory failure that initially responded to steroids, then with worsening after taper with development of acute hypoxemic and hypercapnic respiratory failure requiring intubation and VV- ECMO on 9/13, then transferred to [facility], concerning for DAH vs ILD flare, decannulated from VV-ECMO 9/21, extubated 9/22. S/p pulse steroids, PLEX (9/15, 9/18, 9/20) and Rituximab (9/16 & 10/3). Course c/b severe leukopenia s/p filgastrim, shock liver with slow to resolved hyperbilirubinemia, RIJ DVT, oropharyngeal dysphagia, and recurrent SVT. Repeat CT chest on 9/30 with markedly improved bilateral groundglass opacities with resolved lung consolidations. Now in acute rehab on 4 lpm NC oxygen and tapering dose of medrol.     Problem List:    -  Acute Hypoxic Resp failure   -  Interstitial lung disease   -  Mixed connective tissue disease   -  Acute hypoxia  -  RIJ DVT     Plan:  - Hypoxic episodes while asleep probable due to JACEY   - Continue supp O2 at night   - WBC elevation due to steroid  - Cont. Methylprednisolone PO, taper as per rheumatology recs. from [facility]   - Continue atovaquone for PCP prophylaxis  - S/p Rituximab 9/16 and 10/3  - S/p PLEX during MICU stay  - Repeat CT scan in 6 weeks ~ November 11  - No evidence of active infection, monitor off antibiotics  - Cont. anticoagulation for DVT associated with ECMO cannula  - Consider incentive spirometry   - Care per primary team  - Discussed with spouse at bedside   - PT OOB as tolerated  - Outpatient pulmonary and rheumatology follow up upon discharge      spoke with partner at bedside

## 2023-11-09 NOTE — PROGRESS NOTE ADULT - ASSESSMENT
Assessment/Plan:  ALIZA FOLEY is a 62 year old female with PMH of pAFIB and sciatica; who presented to [facility] ED with SOB, and was found to have groundglass opacities and interstitial lung disease. She was treated with empiric Vancomycin and Zosyn. She underwent a bronchoscopy with bronchoalveolar lavage and pulse steroids. She was given IV diuretics for increased pulmonary congestion, but her respiratory status continued to worsen.  On 9/13, she was transferred to [facility] for ECMO requirements. She was further treated with Plasmapheresis, Rituximab and high-dose steroids, with significant improvement. Her overall hospital course was complicated by thrombocytopenia (s/p several platelet transfusions), leukocytosis (infectious workup entirely negative- s/p Vanco and Ceftriaxone), AFIB with RVR (resolved with Metoprolol), dysphagia (requiring NGT), transaminitis (secondary to acute illness and hypotension),  non-occlusive RIJ DVT (started on Lovenox). Patient now admitted for a multidisciplinary rehab program. 10-05-23 @ 13:18    * Sustained overall medical (pulmonary, cardiac) and functional improvement  * Pulmonary f/u appreciated,   * Labs unremarkable   * Mainly requiring oxy nightly, -- Continue tapering oxygen,  *  Due Repeat CT chest 11/11  * Therapy goals discussed and clarified     #Interstitial Lung Disease and Mixed connective tissue disease  - Gait Instability, ADL impairments and Functional impairments: start Comprehensive Rehab Program of PT/OT/SLP - 3 hours a day, 5 days a week  - P&O as needed   - Non-specific Interstitial Lung Disease  - Requiring ECMO   - Improvement s/p Plasmapheresis, Rituximab and high- dose steroids   - Albuterol/Ipratropium Nebulizer every 6 hours  - Mepron 1500mg daily  - PO Medrol ( due to liver dysfunction): Plan will be to taper by ~20% every 2 weeks    Medrol   64mg x 2 weeks   56mg x 2 weeks  44mg x 2 weeks   36mg x 2 weeks - Follow up Outpatient   -- Repeat CT Chest in 6 weeks   --Pulmonary team--f/u with patient and clarify when she can get immunizations    -Perform BL elbow flexion & elbow extension with antigravity in therapy  - Noted to have missed 5 days between steroid taper dosing- resumed at 56mg on 10/26- pulm recommends to continue as originally instructed   - Plan to wean 02 as tolerated, Continue tapering oxygen,  - Repeat CT Chest on 11/11     * Posttnasal drip--ENT review 11/7, declined scope, commenced flonase as reocmmended     #pAFIB/Rt Ij thrombus  - Metoprolol 25mg BID and lovenox  --- Eliquis 5mg BID - tolerating well     # Chronic Tinnitus   - ENT review and recs appreciate, Patient already made ENT appointment for f/u    # Lymphedema both legs -chronic and Rt IJ thrombus  - ACE Wrap BL LEs  - BL LE doppler negative for DVT  - BL UE doppler with chronic thrombotic changes in RIJ     #Transaminitis --LFT Down trending   - Secondary to acute illness and hypotension at [facility]  - Outpatient follow up     #Neuropathy  - Gabapentin 300 mg BID, will consider increasing dose  --Work on motor strengthening, priority for UE as preferred by patient   - Vit b12 >2,000 in 9/2023    #Sleep/Mood  - Melatonin 6mg at HS  - Psych rec Xanax 0.25mg PRN    #Skin  - Skin: Left lower abdomen ruptured blister 3.0 x 1.0, stage 2 pressure injury to right buttock 4 x1 and left buttock 3x1, stage 2 pressure injury ti right inner thigh 0.2 x 0.2, right groin wound 10.5 x 2.0, Left groin wound 5 x 5,  right neck scab wound  -- MAD to skin folds- Nystatin to submammary and abdominal pannus BID  -- MAD to L groin- cleanse with NS, Triad cream daily  -- Mad to R groin- Nystatin powder daily   -- R groin wound-- aquacel packing, Triad to periwound, Allevyn foam- daily and PRN   - Pressure injury/Skin: OOB to Chair, PT/OT   - Offload pressure, low air loss support surfaces, T&P every 2 hours   --Wound care consult-10/9 -Multiple wounds--perineal and buttock/sacral, recs appreciated   --Suggest Continue treatments as below:   Twice daily & PRN Normal Saline Cleanse of abdominal pannus & breast folds, perineal, Left & Right inner buttock erosions.  Pat thoroughly dry.   Apply Desitin generously twice daily (& PRN) to perineal, buttocks, and left groin erosions, leave open to air.    Apply Nystatin powder to abdominal pannus and breast folds.  Suggest use of Interdry cloths in folds to 'wick' moisture.  Suggest daily Normal Saline Cleanse of right groin surgical wound, pat dry.  Apply Cavillon film barrier to intact periwound skin.  Place Aquacell strip over open area, cover with foam dressing.  - Wound care following     #Pain Mgmt   - Tylenol PRN   - Gabapentin 300mg at HS     #GI/Bowel Mgmt   - Pantoprazole  - Senna  --on hold due to recent Loose BM  - PRN: Maalox   - Stool count  --Lactobacillus BID   -- AB XR mod stool burden on transverse colon 10/23--still has mod stool burden, but moving bowel appropriately  - S/p enema and lactulose    # Alternating bowel function --continue probiotic, lactobacillus   * Leucocytosis probably steroid related and partly due to her Mixed Connective Tissue Disease, will continue monitoring     #/Bladder Mgmt   -Spontaneously voiding   - UA (11/3) negative    #FEN   #Dysphagia  - Diet - Minced & Moist  [CC]    - Dysphaiga- SLP evaluation     #Health maintenance  - Folic Acid   - MVI  - Ocean nasal spray    # Difficulty with access to peripheral veins-- Blood draws from Left arm Medline     #Precautions / PROPHYLAXIS:   - Falls  - ortho: Weight bearing status: WBAT   - Lungs: Aspiration, Incentive Spirometer   - DVT PPX: Eliquis 5 mg BID     11/9--Labd CBC mild anemia, normal renal function    ---------------------------  IDT conference on 11/7  Social Work: Await peer to peer with CM. Provided private hire list. Lives with spouse in [place] 6 months of year. [facility] in [place] with elevator, no steps. Supportive spouse.   Function--Min assistance upper body dressing and upper body grooming  Total assistance with transfers, sitting balance significantly improved, now independent  Able to eat with both hands and comb hair unassisted  Ambulation--Max Assist with light gate x 4 persons  Barrier--Requirement for max assistance most activities, still still 02 dependent  DME--Wheel chair  Est dc home vs Subacute Rehab 11/14 (revised dc date in view of sustained improvement, especially with upper extremity function)    MEETING WITH PATIENT AND PARTNER ON THERAPY GOALS--11/9  Both of them clearly stated their long and short term therapy goals  Short term--to sustain current improvement with Upper ext function and continue engagement for improvement on LE function--strengthening and wt bearing   Long term goal--to ambulate with device and subsequently without device. She clearly stated that this would take considerable time and not likely to happen prior to dc form acute rehab.  She report that although she had often stated that she wants to walk out from the acute rehab unit, this assertion remains a goal, but does not mean an immediate goal  She understands the concern by her therapists that she may have unrealistic goals, but both patient and partner clearly stated they are aware of current deficits, marked improvements already made and they will continue to work with  and insurance company with regards to discharge planning   We discussed other details with regards to home improvement in preparation for d/c home, Patient and partner stated that they are making plans in this direction as well    OUTPATIENT/FOLLOW UP:    Trae Whitney  Pulmonary Disease  [address], [address] [place] [address]  Phone: [phone]  Fax: [phone]  Follow Up Time: 2 weeks    Ryanne Allen  Rheumatology  [address] [address]  Phone: [phone]  Fax: [phone]  Follow Up Time: 1 week    Kyle Pierce  Internal Medicine  [address] [place] [address], [address]  [place] [address]  Phone: [phone]  Fax: [phone]  Follow Up Time: 2 weeks    Addy Powers  Pulmonary Disease  [address] [facility], [address] [id number]  Phone: [phone]  Fax: [phone]  Follow Up Time:     Kwame Hawley  Critical Care Medicine  [address] [facility], [address]  [place] [address]  Phone: [phone]  Fax: [phone]  Follow Up Time:     Neena Borrego  Rheumatology  [address] [place] [address], [address]  [place] [address]  Phone: [phone]  Fax: [phone]  Follow Up Time:    [facility]  [address]  Scheduled Appointment: 09/13/2023  2:40 PM  ---------------------------    Non critical care  Time based billing   spent 63 mins, review, observation in therapy, discussion with partner and extensive  discussion on rehab goals, , care co ordination and d/c planning

## 2023-11-09 NOTE — PROGRESS NOTE ADULT - SUBJECTIVE AND OBJECTIVE BOX
SUBJECTIVE/ROS:  Patient seen and examined, at bed side and observed in therapy  Partner present  Both report an uneventful night  Patient reports sleeping well  Reports rectal discomfort/pain when defecating, no bleeding, probably due to her hemorrhoids. She will be applying the hemorrhoidal cream today    ROS--No chest or abd pain, no palpitations nausea or vomiting   LBM 11/8    Therapy.  Continued improvement with upper extremity function: Observed eating with both hands, no assistance, improved motor strength, most prominent on distal upper extrimities, able to comb her hair, , hold and use items., overall markedly improved fine motor skills and ADLs  Lower extremity--Improved planter flexion and improving dorsiflexion strength, some limitation due to tightness, no ulcers,   Able to stand on light gate limited period yesterday, no light headedness or dizziness, BP remains stable during therapy sessions  Aim to continue strengthening and weight bearing with b/l legs  Medical--Labs have remained stable, WBC normalized, renal function normal  Therapeutic AC for A fib management is being done in liaison with cardiology    Respiratory function--sustained improvement, now requiring oxy NC minimal volume 1l (compared to 3-4l required on admission) and for limited period, night time, able to sustain therapy sessions without oxy  BP remains stable. Pulmonary team has been following up and in their note yesterday, they acknowledged overall resp improvement and they have plans for repeat CT chest on 11/11 to reassess lung function  They recommended to continue tapering oxy as patient is on course to successfully wean off oxy. This would greatly enhance her engagement in therapy and psychological well being  Patient has remained  motivated, compliant and co operative in all therapy sessions       MEETING WITH PATIENT AND PARTNER ON THERAPY GOALS  Both of them clearly stated their long and short term therapy goals  Short term--to sustain current improvement with Upper ext function and continue engagement for improvement on LE function--strengthening and wt bearing   Long term goal--to ambulate with device and subsequently without device. She clearly stated that this would take considerable time and not likely to happen prior to dc form acute rehab.  She report that although she had often stated that she wants to walk out from the acute rehab unit, this assertion remains a goal, but does not mean an immediate goal  She understands the concern by her therapists that she may have unrealistic goals, but both patient and partner clearly stated they are aware of current deficits, marked improvements already made and they will continue to work with  and insurance company with regards to discharge planning   We discussed other details with regards to home improvement in preparation for d/c home, Patient and partner stated that they are making plans in this direction as well     Vital Signs Last 24 Hrs  T(C): 36.7 (09 Nov 2023 09:17), Max: 37.1 (08 Nov 2023 20:04)  T(F): 98 (09 Nov 2023 09:17), Max: 98.8 (08 Nov 2023 20:04)  HR: 60 (09 Nov 2023 09:17) (60 - 85)  BP: 100/61 (09 Nov 2023 09:17) (100/61 - 109/71)  RR: 16 (09 Nov 2023 09:17) (16 - 16)  SpO2: 96% (09 Nov 2023 09:17) (94% - 96%)  O2 Parameters below as of 09 Nov 2023 09:17  Patient On (Oxygen Delivery Method): room air  O2 Flow (L/min): 1      PHYSICAL EXAM:  Gen -  NAD, Comfortable, no using oxy at bed side and during therapy, oxy sat >97% R/A  Neck - Supple, No limited ROM, O2 via NC  Pulm - clear  Cardiovascular - warm and well perfused, no cyanosis  Abdomen - Soft, non tender   Extremities - edema to b/l LE, no calf tenderness, LUE Med line    Neuro-     Cognitive - awake, alert, fully oriented, engaging, speech normal      Motor -                    LEFT    UE - 4/5                    RIGHT UE - 4/5                     LEFT    LE - 3+/5, distally and 3/5 proximal                    RIGHT LE - 3+/5      Sensory - Intact     MSK: improvement with motor strenght  Psychiatric - Mood stable, Affect WNL  Skin (unable to assess while sitting in recliner): previous exam: Left lower abdomen ruptured blister 3.0 x 1.0, stage 2 pressure injury to right buttock 4 x1 and left buttock 3x1, stage 2 pressure injury ti right inner thigh 0.2 x 0.2, right groin wound 10.5 x 2.0, Left groin wound 5 x 5    MMT--Able to contract B/L upper back muscles, EF/EE 3+/5    RECENT LABS/IMAGING                        10.2   11.30 )-----------( 438      ( 09 Nov 2023 10:00 )             33.0     11-09    138  |  102  |  21  ----------------------------<  121<H>  3.4<L>   |  30  |  0.46<L>    Ca    9.4      09 Nov 2023 10:00    TPro  5.3<L>  /  Alb  2.6<L>  /  TBili  1.1  /  DBili  x   /  AST  33  /  ALT  80<H>  /  AlkPhos  263<H>  11-09      Urinalysis Basic - ( 09 Nov 2023 10:00 )    Color: x / Appearance: x / SG: x / pH: x  Gluc: 121 mg/dL / Ketone: x  / Bili: x / Urobili: x   Blood: x / Protein: x / Nitrite: x   Leuk Esterase: x / RBC: x / WBC x   Sq Epi: x / Non Sq Epi: x / Bacteria: x      MEDICATIONS  (STANDING):  ammonium lactate 12% Lotion 1 Application(s) Topical every 12 hours  apixaban 5 milliGRAM(s) Oral every 12 hours  artificial  tears Solution 1 Drop(s) Both EYES every 12 hours  ascorbic acid 500 milliGRAM(s) Oral daily  atovaquone  Suspension 1500 milliGRAM(s) Oral daily  bisacodyl 5 milliGRAM(s) Oral <User Schedule>  dextrose 5%. 1000 milliLiter(s) (50 mL/Hr) IV Continuous <Continuous>  dextrose 5%. 1000 milliLiter(s) (100 mL/Hr) IV Continuous <Continuous>  dextrose 50% Injectable 25 Gram(s) IV Push once  dextrose 50% Injectable 25 Gram(s) IV Push once  dextrose 50% Injectable 12.5 Gram(s) IV Push once  folic acid 1 milliGRAM(s) Oral daily  gabapentin 300 milliGRAM(s) Oral two times a day  glucagon  Injectable 1 milliGRAM(s) IntraMuscular once  hemorrhoidal Ointment 1 Application(s) Rectal four times a day  hydrocortisone hemorrhoidal Suppository 1 Suppository(s) Rectal two times a day  lactobacillus acidophilus 1 Tablet(s) Oral two times a day with meals  melatonin 6 milliGRAM(s) Oral at bedtime  methylPREDNISolone   Oral   methylPREDNISolone 44 milliGRAM(s) Oral daily  metoprolol tartrate 25 milliGRAM(s) Oral two times a day  multivitamin 1 Tablet(s) Oral daily  nystatin Powder 1 Application(s) Topical two times a day  pantoprazole    Tablet 40 milliGRAM(s) Oral before breakfast  psyllium Powder 1 Packet(s) Oral daily  sodium chloride 0.9% lock flush 5 milliLiter(s) IV Push two times a day  zinc oxide 40% Paste 1 Application(s) Topical three times a day    MEDICATIONS  (PRN):  albuterol/ipratropium for Nebulization 3 milliLiter(s) Nebulizer every 6 hours PRN Shortness of Breath and/or Wheezing  ALPRAZolam 0.25 milliGRAM(s) Oral every 6 hours PRN Anxiety  aluminum hydroxide/magnesium hydroxide/simethicone Suspension 30 milliLiter(s) Oral every 4 hours PRN Dyspepsia  benzocaine/menthol Lozenge 1 Lozenge Oral two times a day PRN Sore Throat  benzonatate 100 milliGRAM(s) Oral three times a day PRN Cough  dextrose Oral Gel 15 Gram(s) Oral once PRN Blood Glucose LESS THAN 70 milliGRAM(s)/deciliter  loperamide 2 milliGRAM(s) Oral three times a day PRN Diarrhea  polyethylene glycol 3350 17 Gram(s) Oral daily PRN Constipation  SIMETHICONE SOFTGELS 250 milliGRAM(s) 250 milliGRAM(s) Oral three times a day PRN for gas  sodium chloride 0.65% Nasal 1 Spray(s) Both Nostrils every 8 hours PRN Nasal Congestion

## 2023-11-10 PROCEDURE — 99232 SBSQ HOSP IP/OBS MODERATE 35: CPT

## 2023-11-10 PROCEDURE — 71250 CT THORAX DX C-: CPT | Mod: 26

## 2023-11-10 RX ORDER — GABAPENTIN 400 MG/1
300 CAPSULE ORAL THREE TIMES A DAY
Refills: 0 | Status: DISCONTINUED | OUTPATIENT
Start: 2023-11-10 | End: 2023-12-20

## 2023-11-10 RX ADMIN — NYSTATIN CREAM 1 APPLICATION(S): 100000 CREAM TOPICAL at 18:23

## 2023-11-10 RX ADMIN — Medication 6 MILLIGRAM(S): at 20:18

## 2023-11-10 RX ADMIN — Medication 1 DROP(S): at 09:01

## 2023-11-10 RX ADMIN — GABAPENTIN 300 MILLIGRAM(S): 400 CAPSULE ORAL at 20:21

## 2023-11-10 RX ADMIN — APIXABAN 5 MILLIGRAM(S): 2.5 TABLET, FILM COATED ORAL at 08:55

## 2023-11-10 RX ADMIN — Medication 1 APPLICATION(S): at 09:01

## 2023-11-10 RX ADMIN — Medication 1 TABLET(S): at 18:31

## 2023-11-10 RX ADMIN — Medication 1 MILLIGRAM(S): at 15:51

## 2023-11-10 RX ADMIN — SODIUM CHLORIDE 5 MILLILITER(S): 9 INJECTION INTRAMUSCULAR; INTRAVENOUS; SUBCUTANEOUS at 18:23

## 2023-11-10 RX ADMIN — Medication 1 APPLICATION(S): at 18:23

## 2023-11-10 RX ADMIN — ZINC OXIDE 1 APPLICATION(S): 200 OINTMENT TOPICAL at 09:01

## 2023-11-10 RX ADMIN — Medication 44 MILLIGRAM(S): at 13:21

## 2023-11-10 RX ADMIN — SODIUM CHLORIDE 5 MILLILITER(S): 9 INJECTION INTRAMUSCULAR; INTRAVENOUS; SUBCUTANEOUS at 09:01

## 2023-11-10 RX ADMIN — NYSTATIN CREAM 1 APPLICATION(S): 100000 CREAM TOPICAL at 09:01

## 2023-11-10 RX ADMIN — GABAPENTIN 300 MILLIGRAM(S): 400 CAPSULE ORAL at 08:53

## 2023-11-10 RX ADMIN — ZINC OXIDE 1 APPLICATION(S): 200 OINTMENT TOPICAL at 16:27

## 2023-11-10 RX ADMIN — GABAPENTIN 300 MILLIGRAM(S): 400 CAPSULE ORAL at 15:52

## 2023-11-10 RX ADMIN — Medication 1 TABLET(S): at 08:55

## 2023-11-10 RX ADMIN — Medication 25 MILLIGRAM(S): at 20:17

## 2023-11-10 RX ADMIN — Medication 1 TABLET(S): at 15:52

## 2023-11-10 RX ADMIN — APIXABAN 5 MILLIGRAM(S): 2.5 TABLET, FILM COATED ORAL at 20:17

## 2023-11-10 RX ADMIN — Medication 500 MILLIGRAM(S): at 15:51

## 2023-11-10 RX ADMIN — Medication 25 MILLIGRAM(S): at 08:55

## 2023-11-10 RX ADMIN — Medication 5 MILLIGRAM(S): at 08:55

## 2023-11-10 RX ADMIN — PHENYLEPHRINE-SHARK LIVER OIL-MINERAL OIL-PETROLATUM RECTAL OINTMENT 1 APPLICATION(S): at 18:23

## 2023-11-10 RX ADMIN — PANTOPRAZOLE SODIUM 40 MILLIGRAM(S): 20 TABLET, DELAYED RELEASE ORAL at 08:55

## 2023-11-10 RX ADMIN — ATOVAQUONE 1500 MILLIGRAM(S): 750 SUSPENSION ORAL at 15:51

## 2023-11-10 NOTE — PROGRESS NOTE ADULT - SUBJECTIVE AND OBJECTIVE BOX
SUBJECTIVE/ROS:  Patient seen and examined, at bed side Partner presentR  Reports good night rest  Reports small volume stool multiple episodes yesterday, no abd pain  Rectal pain resolving with hemorrhoidal cream  Reports that tingling and numbness of fingers and sole of foot is persisting, on low dose gabapentin, no sedation    ROS--No chest or abd pain, no palpitations nausea or vomiting   LBM 11/9    Therapy.  Worked on LE muscle strenghtening, focussing on ankle and knee  Ankle strength is improving  Off oxy during review and most of the day,     Further discussion on rehab goals-  She clearly stated that her goals remain as previously discussed yesterday  She stated that rather, her partner is the one that is often pushing for goals that she (patient) feels are unrealistic  She is aware of current insurance approval of her stay till 11/13 and plan remains to send updated notes to insurance to determine subsequent approval    Vital Signs Last 24 Hrs  T(C): 36.3 (10 Nov 2023 08:52), Max: 36.3 (10 Nov 2023 08:52)  T(F): 97.3 (10 Nov 2023 08:52), Max: 97.3 (10 Nov 2023 08:52)  HR: 80 (10 Nov 2023 08:52) (80 - 80)  BP: 111/66 (10 Nov 2023 08:52) (111/66 - 111/66)  RR: 16 (10 Nov 2023 08:52) (16 - 16)  SpO2: 97% (10 Nov 2023 08:52) (97% - 97%)  O2 Parameters below as of 10 Nov 2023 08:52  Patient On (Oxygen Delivery Method): room air      PHYSICAL EXAM:  Gen -  NAD, Comfortable, no using oxy at bed side and during therapy, oxy sat >97% R/A  Neck - Supple, No limited ROM, O2 via NC  Pulm - clear  Cardiovascular - HS i and II intermittently irregular  Abdomen - Soft, non tender   Extremities - edema to b/l LE, no calf tenderness, LUE Med line    Neuro-     Cognitive - awake, alert, fully oriented, engaging, speech normal      Motor -                    LEFT    UE - 4/5                    RIGHT UE - 4/5                     LEFT    LE - 3+/5, distally and 3/5 proximal                    RIGHT LE - 3+/5   Sensory - decreased light tough sensation fingers and sole of foot, difusely  MSK: improvement with motor strength  Psychiatric - Mood stable, Affect WNL  Skin -- Left lower abdomen ruptured blister 3.0 x 1.0, stage 2 pressure injury to right buttock 4 x1 and left buttock 3x1, stage 2 pressure injury ti right inner thigh 0.2 x 0.2, right groin wound 10.5 x 2.0, Left groin wound 5 x 5, dressing in situ    MMT--Able to contract B/L upper back muscles, EF/EE 3+/5    RECENT LABS/IMAGING                        10.2   11.30 )-----------( 438      ( 09 Nov 2023 10:00 )             33.0     11-09    138  |  102  |  21  ----------------------------<  121<H>  3.4<L>   |  30  |  0.46<L>    Ca    9.4      09 Nov 2023 10:00    TPro  5.3<L>  /  Alb  2.6<L>  /  TBili  1.1  /  DBili  x   /  AST  33  /  ALT  80<H>  /  AlkPhos  263<H>  11-09            Urinalysis Basic - ( 09 Nov 2023 10:00 )    Color: x / Appearance: x / SG: x / pH: x  Gluc: 121 mg/dL / Ketone: x  / Bili: x / Urobili: x   Blood: x / Protein: x / Nitrite: x   Leuk Esterase: x / RBC: x / WBC x   Sq Epi: x / Non Sq Epi: x / Bacteria: x      MEDICATIONS  (STANDING):  ammonium lactate 12% Lotion 1 Application(s) Topical every 12 hours  apixaban 5 milliGRAM(s) Oral every 12 hours  artificial  tears Solution 1 Drop(s) Both EYES every 12 hours  ascorbic acid 500 milliGRAM(s) Oral daily  atovaquone  Suspension 1500 milliGRAM(s) Oral daily  bisacodyl 5 milliGRAM(s) Oral <User Schedule>  dextrose 5%. 1000 milliLiter(s) (50 mL/Hr) IV Continuous <Continuous>  dextrose 5%. 1000 milliLiter(s) (100 mL/Hr) IV Continuous <Continuous>  dextrose 50% Injectable 12.5 Gram(s) IV Push once  dextrose 50% Injectable 25 Gram(s) IV Push once  dextrose 50% Injectable 25 Gram(s) IV Push once  folic acid 1 milliGRAM(s) Oral daily  gabapentin 300 milliGRAM(s) Oral three times a day  glucagon  Injectable 1 milliGRAM(s) IntraMuscular once  hemorrhoidal Ointment 1 Application(s) Rectal four times a day  hydrocortisone hemorrhoidal Suppository 1 Suppository(s) Rectal two times a day  lactobacillus acidophilus 1 Tablet(s) Oral two times a day with meals  melatonin 6 milliGRAM(s) Oral at bedtime  methylPREDNISolone 44 milliGRAM(s) Oral daily  methylPREDNISolone   Oral   metoprolol tartrate 25 milliGRAM(s) Oral two times a day  multivitamin 1 Tablet(s) Oral daily  nystatin Powder 1 Application(s) Topical two times a day  pantoprazole    Tablet 40 milliGRAM(s) Oral before breakfast  psyllium Powder 1 Packet(s) Oral daily  sodium chloride 0.9% lock flush 5 milliLiter(s) IV Push two times a day  zinc oxide 40% Paste 1 Application(s) Topical three times a day    MEDICATIONS  (PRN):  albuterol/ipratropium for Nebulization 3 milliLiter(s) Nebulizer every 6 hours PRN Shortness of Breath and/or Wheezing  ALPRAZolam 0.25 milliGRAM(s) Oral every 6 hours PRN Anxiety  aluminum hydroxide/magnesium hydroxide/simethicone Suspension 30 milliLiter(s) Oral every 4 hours PRN Dyspepsia  benzocaine/menthol Lozenge 1 Lozenge Oral two times a day PRN Sore Throat  benzonatate 100 milliGRAM(s) Oral three times a day PRN Cough  dextrose Oral Gel 15 Gram(s) Oral once PRN Blood Glucose LESS THAN 70 milliGRAM(s)/deciliter  loperamide 2 milliGRAM(s) Oral three times a day PRN Diarrhea  polyethylene glycol 3350 17 Gram(s) Oral daily PRN Constipation  SIMETHICONE SOFTGELS 250 milliGRAM(s) 250 milliGRAM(s) Oral three times a day PRN for gas  sodium chloride 0.65% Nasal 1 Spray(s) Both Nostrils every 8 hours PRN Nasal Congestion

## 2023-11-10 NOTE — PROGRESS NOTE ADULT - ASSESSMENT
Assessment/Plan:  ALIZA FOLEY is a 62 year old female with PMH of pAFIB and sciatica; who presented to [facility] ED with SOB, and was found to have groundglass opacities and interstitial lung disease. She was treated with empiric Vancomycin and Zosyn. She underwent a bronchoscopy with bronchoalveolar lavage and pulse steroids. She was given IV diuretics for increased pulmonary congestion, but her respiratory status continued to worsen.  On 9/13, she was transferred to [facility] for ECMO requirements. She was further treated with Plasmapheresis, Rituximab and high-dose steroids, with significant improvement. Her overall hospital course was complicated by thrombocytopenia (s/p several platelet transfusions), leukocytosis (infectious workup entirely negative- s/p Vanco and Ceftriaxone), AFIB with RVR (resolved with Metoprolol), dysphagia (requiring NGT), transaminitis (secondary to acute illness and hypotension),  non-occlusive RIJ DVT (started on Lovenox). Patient now admitted for a multidisciplinary rehab program. 10-05-23 @ 13:18    * Sustained overall medical (pulmonary, cardiac) and functional improvement, focussing more on LE function   * CT chest today as recs by pulmonary, serial montoring   * Mainly requiring oxy nightly, -- Continue tapering oxygen,    #Interstitial Lung Disease and Mixed connective tissue disease  - Gait Instability, ADL impairments and Functional impairments: start Comprehensive Rehab Program of PT/OT/SLP - 3 hours a day, 5 days a week  - P&O as needed   - Non-specific Interstitial Lung Disease  - Requiring ECMO   - Improvement s/p Plasmapheresis, Rituximab and high- dose steroids   - Albuterol/Ipratropium Nebulizer every 6 hours  - Mepron 1500mg daily  - PO Medrol ( due to liver dysfunction): Plan will be to taper by ~20% every 2 weeks    Medrol   64mg x 2 weeks   56mg x 2 weeks  44mg x 2 weeks   36mg x 2 weeks - Follow up Outpatient   - Plan to wean 02 as tolerated, Continue tapering oxygen,  - Repeat CT Chest tody 11/10    * Posttnasal drip--ENT review 11/7, declined scope, continue flonase     #pAFIB/Rt Ij thrombus  - Metoprolol 25mg BID and lovenox  --- Eliquis 5mg BID - tolerating well     # Chronic Tinnitus   - ENT review and recs appreciate, Patient already made ENT appointment for f/u    # Lymphedema both legs -chronic and Rt IJ thrombus  - ACE Wrap BL LEs  - BL LE doppler negative for DVT  - BL UE doppler with chronic thrombotic changes in RIJ     #Transaminitis --LFT Down trending   - Secondary to acute illness and hypotension at [facility]  - Outpatient follow up     #Neuropathy  - Gabapentin 300 mg BID, will consider increasing dose, increase to TID 11/10  --Work on motor strengthening, priority for UE as preferred by patient   - Vit b12 >2,000 in 9/2023    #Sleep/Mood  - Melatonin 6mg at HS  - Psych rec Xanax 0.25mg PRN    #Skin  - Skin: Left lower abdomen ruptured blister 3.0 x 1.0, stage 2 pressure injury to right buttock 4 x1 and left buttock 3x1, stage 2 pressure injury ti right inner thigh 0.2 x 0.2, right groin wound 10.5 x 2.0, Left groin wound 5 x 5,  right neck scab wound  -- MAD to skin folds- Nystatin to submammary and abdominal pannus BID  -- MAD to L groin- cleanse with NS, Triad cream daily  -- Mad to R groin- Nystatin powder daily   -- R groin wound-- aquacel packing, Triad to periwound, Allevyn foam- daily and PRN   - Pressure injury/Skin: OOB to Chair, PT/OT   - Offload pressure, low air loss support surfaces, T&P every 2 hours   --Wound care consult-10/9 -Multiple wounds--perineal and buttock/sacral, recs appreciated   --Suggest Continue treatments as below:   Twice daily & PRN Normal Saline Cleanse of abdominal pannus & breast folds, perineal, Left & Right inner buttock erosions.  Pat thoroughly dry.   Apply Desitin generously twice daily (& PRN) to perineal, buttocks, and left groin erosions, leave open to air.    Apply Nystatin powder to abdominal pannus and breast folds.  Suggest use of Interdry cloths in folds to 'wick' moisture.  Suggest daily Normal Saline Cleanse of right groin surgical wound, pat dry.  Apply Cavillon film barrier to intact periwound skin.  Place Aquacell strip over open area, cover with foam dressing.  - Wound care following     #Pain Mgmt   - Tylenol PRN   - Gabapentin 300mg at HS     #GI/Bowel Mgmt   - Pantoprazole  - Senna  --on hold due to recent Loose BM  - PRN: Maalox   - Stool count  --Lactobacillus BID   -- AB XR mod stool burden on transverse colon 10/23--still has mod stool burden, but moving bowel appropriately  - S/p enema and lactulose    # Alternating bowel function --continue probiotic, lactobacillus   * Leucocytosis probably steroid related and partly due to her Mixed Connective Tissue Disease, will continue monitoring     #/Bladder Mgmt   -Spontaneously voiding   - UA (11/3) negative    #FEN   #Dysphagia  - Diet - Minced & Moist  [CC]    - Dysphaiga- SLP evaluation     #Health maintenance  - Folic Acid   - MVI  - Ocean nasal spray    # Difficulty with access to peripheral veins-- Blood draws from Left arm Medline     #Precautions / PROPHYLAXIS:   - Falls  - ortho: Weight bearing status: WBAT   - Lungs: Aspiration, Incentive Spirometer   - DVT PPX: Eliquis 5 mg BID     11/9--Labd CBC mild anemia, normal renal function    ---------------------------  IDT conference on 11/7  Social Work: Await peer to peer with CM. Provided private hire list. Lives with spouse in [place] 6 months of year. DC to [facility] in [place] with elevator, no steps. Supportive spouse.   Function--Min assistance upper body dressing and upper body grooming  Total assistance with transfers, sitting balance significantly improved, now independent  Able to eat with both hands and comb hair unassisted  Ambulation--Max Assist with light gate x 4 persons  Barrier--Requirement for max assistance most activities, still still 02 dependent  DME--Wheel chair  Est dc home vs Subacute Rehab 11/14 (revised dc date in view of sustained improvement, especially with upper extremity function)    MEETING WITH PATIENT AND PARTNER ON THERAPY GOALS--11/9  Both of them clearly stated their long and short term therapy goals  Short term--to sustain current improvement with Upper ext function and continue engagement for improvement on LE function--strengthening and wt bearing   Long term goal--to ambulate with device and subsequently without device. She clearly stated that this would take considerable time and not likely to happen prior to dc form acute rehab.  She report that although she had often stated that she wants to walk out from the acute rehab unit, this assertion remains a goal, but does not mean an immediate goal  She understands the concern by her therapists that she may have unrealistic goals, but both patient and partner clearly stated they are aware of current deficits, marked improvements already made and they will continue to work with  and insurance company with regards to discharge planning   We discussed other details with regards to home improvement in preparation for d/c home, Patient and partner stated that they are making plans in this direction as well    OUTPATIENT/FOLLOW UP:    Trae Whitney  Pulmonary Disease  [address], [address] [place] [address]  Phone: [phone]  Fax: [phone]  Follow Up Time: 2 weeks    Ryanne Allen  Rheumatology  [address] [address]  Phone: [phone]  Fax: [phone]  Follow Up Time: 1 week    Kyle Pierce  Internal Medicine  [address] [place] [address], [address]  [place] [address]  Phone: [phone]  Fax: [phone]  Follow Up Time: 2 weeks    Addy Powers  Pulmonary Disease  [address] [facility], [facility] [address]  [place], [place] [id number]  Phone: [phone]  Fax: [phone]  Follow Up Time:     Kwame Hawley  Critical Care Medicine  [address] [facility], [address] [address]  Phone: [phone]  Fax: [phone]  Follow Up Time:     Neena Borrego  Rheumatology  [address] [place] [address], [address]  [place] [address]  Phone: [phone]  Fax: [phone]  Follow Up Time:    [facility]  [facility] [address]  Scheduled Appointment: 09/13/2023  2:40 PM  ---------------------------

## 2023-11-10 NOTE — PROGRESS NOTE ADULT - SUBJECTIVE AND OBJECTIVE BOX
Time of Visit:  Patient seen and examined.     MEDICATIONS  (STANDING):  ammonium lactate 12% Lotion 1 Application(s) Topical every 12 hours  apixaban 5 milliGRAM(s) Oral every 12 hours  artificial  tears Solution 1 Drop(s) Both EYES every 12 hours  ascorbic acid 500 milliGRAM(s) Oral daily  atovaquone  Suspension 1500 milliGRAM(s) Oral daily  bisacodyl 5 milliGRAM(s) Oral <User Schedule>  dextrose 5%. 1000 milliLiter(s) (50 mL/Hr) IV Continuous <Continuous>  dextrose 5%. 1000 milliLiter(s) (100 mL/Hr) IV Continuous <Continuous>  dextrose 50% Injectable 25 Gram(s) IV Push once  dextrose 50% Injectable 25 Gram(s) IV Push once  dextrose 50% Injectable 12.5 Gram(s) IV Push once  folic acid 1 milliGRAM(s) Oral daily  gabapentin 300 milliGRAM(s) Oral three times a day  glucagon  Injectable 1 milliGRAM(s) IntraMuscular once  hemorrhoidal Ointment 1 Application(s) Rectal four times a day  hydrocortisone hemorrhoidal Suppository 1 Suppository(s) Rectal two times a day  lactobacillus acidophilus 1 Tablet(s) Oral two times a day with meals  melatonin 6 milliGRAM(s) Oral at bedtime  methylPREDNISolone 44 milliGRAM(s) Oral daily  methylPREDNISolone   Oral   metoprolol tartrate 25 milliGRAM(s) Oral two times a day  multivitamin 1 Tablet(s) Oral daily  nystatin Powder 1 Application(s) Topical two times a day  pantoprazole    Tablet 40 milliGRAM(s) Oral before breakfast  psyllium Powder 1 Packet(s) Oral daily  sodium chloride 0.9% lock flush 5 milliLiter(s) IV Push two times a day  zinc oxide 40% Paste 1 Application(s) Topical three times a day      MEDICATIONS  (PRN):  albuterol/ipratropium for Nebulization 3 milliLiter(s) Nebulizer every 6 hours PRN Shortness of Breath and/or Wheezing  ALPRAZolam 0.25 milliGRAM(s) Oral every 6 hours PRN Anxiety  aluminum hydroxide/magnesium hydroxide/simethicone Suspension 30 milliLiter(s) Oral every 4 hours PRN Dyspepsia  benzocaine/menthol Lozenge 1 Lozenge Oral two times a day PRN Sore Throat  benzonatate 100 milliGRAM(s) Oral three times a day PRN Cough  dextrose Oral Gel 15 Gram(s) Oral once PRN Blood Glucose LESS THAN 70 milliGRAM(s)/deciliter  loperamide 2 milliGRAM(s) Oral three times a day PRN Diarrhea  polyethylene glycol 3350 17 Gram(s) Oral daily PRN Constipation  SIMETHICONE SOFTGELS 250 milliGRAM(s) 250 milliGRAM(s) Oral three times a day PRN for gas  sodium chloride 0.65% Nasal 1 Spray(s) Both Nostrils every 8 hours PRN Nasal Congestion       Medications up to date at time of exam.      PHYSICAL EXAMINATION:  Patient has no new complaints.  GENERAL: The patient is a well-developed, well-nourished, in no apparent distress.     Vital Signs Last 24 Hrs  T(C): 36.3 (10 Nov 2023 08:52), Max: 36.3 (10 Nov 2023 08:52)  T(F): 97.3 (10 Nov 2023 08:52), Max: 97.3 (10 Nov 2023 08:52)  HR: 80 (10 Nov 2023 08:52) (80 - 80)  BP: 111/66 (10 Nov 2023 08:52) (111/66 - 111/66)  BP(mean): --  RR: 16 (10 Nov 2023 08:52) (16 - 16)  SpO2: 97% (10 Nov 2023 08:52) (97% - 97%)    Parameters below as of 10 Nov 2023 08:52  Patient On (Oxygen Delivery Method): room air       (if applicable)    Chest Tube (if applicable)    HEENT: Head is normocephalic and atraumatic. Extraocular muscles are intact. Mucous membranes are moist.     NECK: Supple, no palpable adenopathy.    LUNGS: Clear to auscultation, no wheezing, rales, or rhonchi.    HEART: Regular rate and rhythm without murmur.    ABDOMEN: Soft, nontender, and nondistended.  No hepatosplenomegaly is noted.    : No painful voiding, no pelvic pain    EXTREMITIES: Without any cyanosis, clubbing, rash, lesions or edema.    NEUROLOGIC: Awake, alert, oriented, grossly intact    SKIN: Warm, dry, good turgor.      LABS:                        10.2   11.30 )-----------( 438      ( 09 Nov 2023 10:00 )             33.0     11-09    138  |  102  |  21  ----------------------------<  121<H>  3.4<L>   |  30  |  0.46<L>    Ca    9.4      09 Nov 2023 10:00    TPro  5.3<L>  /  Alb  2.6<L>  /  TBili  1.1  /  DBili  x   /  AST  33  /  ALT  80<H>  /  AlkPhos  263<H>  11-09      Urinalysis Basic - ( 09 Nov 2023 10:00 )    Color: x / Appearance: x / SG: x / pH: x  Gluc: 121 mg/dL / Ketone: x  / Bili: x / Urobili: x   Blood: x / Protein: x / Nitrite: x   Leuk Esterase: x / RBC: x / WBC x   Sq Epi: x / Non Sq Epi: x / Bacteria: x                      MICROBIOLOGY: (if applicable)    RADIOLOGY & ADDITIONAL STUDIES:  EKG:   CXR:< from: CT Chest No Cont (11.10.23 @ 11:36) >    ACC: [id number] EXAM:  CT CHEST   ORDERED BY: URI OCHOA     PROCEDURE DATE:  11/10/2023          INTERPRETATION:  CLINICAL INFORMATION: Interstitial lung disease.   Follow-up assessment.    COMPARISON: September 30, 2023. June 14, 2023.    CONTRAST/COMPLICATIONS:  IV Contrast: None  Oral Contrast: None  Complications: Not applicable    PROCEDURE:  CT of the Chest was performed.  Sagittal and coronal reformats were performed.    FINDINGS:    LUNGS AND AIRWAYS: Central bronchial anatomy.  Since prior evaluation   June 2023 there are new predominantly peripherally distributed reticular   opacities identified within the bilateral lung parenchyma, which appear   less prominent than demonstrated on August 26, 2023. No significant   traction bronchiectasis or honeycombing. Band type opacity within the   right lower lobe likely related to platelike atelectasis/scarring.  PLEURA: No pleural effusion. No pneumothorax  MEDIASTINUM AND NIRMALA: No lymphadenopathy.  VESSELS: Within normal limits.  HEART: Cardiomegaly. No pericardial effusion.  CHEST WALL AND LOWER NECK: Within normal limits.  VISUALIZED UPPER ABDOMEN: Gallstones identified in the region of the   gallbladder neck. No gallbladder wall thickening noted. The visualized   portion of the adrenal glands are unremarkable.  BONES: Degenerative changes of the thoracic spine noted.    IMPRESSION:  Since prior evaluation June 2023 there are new predominantly   peripherally distributed reticular opacities identified within the   bilateral lung parenchyma, which appear less prominent than demonstrated   on August 26, 2023. No significant traction bronchiectasis or   honeycombing.          --- End of Report ---            SEAMUS HERNANDEZ MD; Attending Radiologist  This document has been electronically signed. Nov 10 2023  3:42PM    < end of copied text >    ECHO:    IMPRESSION: 62y Female PAST MEDICAL & SURGICAL HISTORY:  Afib      Sciatica      Interstitial lung disease      Lymphedema       p/w           RECOMMENDATIONS:   Time of Visit:  Patient seen and examined.     MEDICATIONS  (STANDING):  ammonium lactate 12% Lotion 1 Application(s) Topical every 12 hours  apixaban 5 milliGRAM(s) Oral every 12 hours  artificial  tears Solution 1 Drop(s) Both EYES every 12 hours  ascorbic acid 500 milliGRAM(s) Oral daily  atovaquone  Suspension 1500 milliGRAM(s) Oral daily  bisacodyl 5 milliGRAM(s) Oral <User Schedule>  dextrose 5%. 1000 milliLiter(s) (50 mL/Hr) IV Continuous <Continuous>  dextrose 5%. 1000 milliLiter(s) (100 mL/Hr) IV Continuous <Continuous>  dextrose 50% Injectable 25 Gram(s) IV Push once  dextrose 50% Injectable 25 Gram(s) IV Push once  dextrose 50% Injectable 12.5 Gram(s) IV Push once  folic acid 1 milliGRAM(s) Oral daily  gabapentin 300 milliGRAM(s) Oral three times a day  glucagon  Injectable 1 milliGRAM(s) IntraMuscular once  hemorrhoidal Ointment 1 Application(s) Rectal four times a day  hydrocortisone hemorrhoidal Suppository 1 Suppository(s) Rectal two times a day  lactobacillus acidophilus 1 Tablet(s) Oral two times a day with meals  melatonin 6 milliGRAM(s) Oral at bedtime  methylPREDNISolone 44 milliGRAM(s) Oral daily  methylPREDNISolone   Oral   metoprolol tartrate 25 milliGRAM(s) Oral two times a day  multivitamin 1 Tablet(s) Oral daily  nystatin Powder 1 Application(s) Topical two times a day  pantoprazole    Tablet 40 milliGRAM(s) Oral before breakfast  psyllium Powder 1 Packet(s) Oral daily  sodium chloride 0.9% lock flush 5 milliLiter(s) IV Push two times a day  zinc oxide 40% Paste 1 Application(s) Topical three times a day      MEDICATIONS  (PRN):  albuterol/ipratropium for Nebulization 3 milliLiter(s) Nebulizer every 6 hours PRN Shortness of Breath and/or Wheezing  ALPRAZolam 0.25 milliGRAM(s) Oral every 6 hours PRN Anxiety  aluminum hydroxide/magnesium hydroxide/simethicone Suspension 30 milliLiter(s) Oral every 4 hours PRN Dyspepsia  benzocaine/menthol Lozenge 1 Lozenge Oral two times a day PRN Sore Throat  benzonatate 100 milliGRAM(s) Oral three times a day PRN Cough  dextrose Oral Gel 15 Gram(s) Oral once PRN Blood Glucose LESS THAN 70 milliGRAM(s)/deciliter  loperamide 2 milliGRAM(s) Oral three times a day PRN Diarrhea  polyethylene glycol 3350 17 Gram(s) Oral daily PRN Constipation  SIMETHICONE SOFTGELS 250 milliGRAM(s) 250 milliGRAM(s) Oral three times a day PRN for gas  sodium chloride 0.65% Nasal 1 Spray(s) Both Nostrils every 8 hours PRN Nasal Congestion       Medications up to date at time of exam.      PHYSICAL EXAMINATION:  Patient has no new complaints.  GENERAL: The patient is a well-developed, well-nourished, in no apparent distress.     Vital Signs Last 24 Hrs  T(C): 36.3 (10 Nov 2023 08:52), Max: 36.3 (10 Nov 2023 08:52)  T(F): 97.3 (10 Nov 2023 08:52), Max: 97.3 (10 Nov 2023 08:52)  HR: 80 (10 Nov 2023 08:52) (80 - 80)  BP: 111/66 (10 Nov 2023 08:52) (111/66 - 111/66)  BP(mean): --  RR: 16 (10 Nov 2023 08:52) (16 - 16)  SpO2: 97% (10 Nov 2023 08:52) (97% - 97%)    Parameters below as of 10 Nov 2023 08:52  Patient On (Oxygen Delivery Method): room air       (if applicable)    Chest Tube (if applicable)    HEENT: Head is normocephalic and atraumatic. Extraocular muscles are intact. Mucous membranes are moist.     NECK: Supple, no palpable adenopathy.    LUNGS: Clear to auscultation, no wheezing, rales, or rhonchi.    HEART: Regular rate and rhythm without murmur.    ABDOMEN: Soft, nontender, and nondistended.  No hepatosplenomegaly is noted.    : No painful voiding, no pelvic pain    EXTREMITIES: Without any cyanosis, clubbing, rash, lesions or edema.    NEUROLOGIC: Awake, alert, oriented, grossly intact motor 4/5 x 4     SKIN: Warm, dry, good turgor.      LABS:                        10.2   11.30 )-----------( 438      ( 09 Nov 2023 10:00 )             33.0     11-09    138  |  102  |  21  ----------------------------<  121<H>  3.4<L>   |  30  |  0.46<L>    Ca    9.4      09 Nov 2023 10:00    TPro  5.3<L>  /  Alb  2.6<L>  /  TBili  1.1  /  DBili  x   /  AST  33  /  ALT  80<H>  /  AlkPhos  263<H>  11-09      Urinalysis Basic - ( 09 Nov 2023 10:00 )    Color: x / Appearance: x / SG: x / pH: x  Gluc: 121 mg/dL / Ketone: x  / Bili: x / Urobili: x   Blood: x / Protein: x / Nitrite: x   Leuk Esterase: x / RBC: x / WBC x   Sq Epi: x / Non Sq Epi: x / Bacteria: x                      MICROBIOLOGY: (if applicable)    RADIOLOGY & ADDITIONAL STUDIES:  EKG:   CXR:< from: CT Chest No Cont (11.10.23 @ 11:36) >    ACC: [id number] EXAM:  CT CHEST   ORDERED BY: URI OCHOA     PROCEDURE DATE:  11/10/2023          INTERPRETATION:  CLINICAL INFORMATION: Interstitial lung disease.   Follow-up assessment.    COMPARISON: September 30, 2023. June 14, 2023.    CONTRAST/COMPLICATIONS:  IV Contrast: None  Oral Contrast: None  Complications: Not applicable    PROCEDURE:  CT of the Chest was performed.  Sagittal and coronal reformats were performed.    FINDINGS:    LUNGS AND AIRWAYS: Central bronchial anatomy.  Since prior evaluation   June 2023 there are new predominantly peripherally distributed reticular   opacities identified within the bilateral lung parenchyma, which appear   less prominent than demonstrated on August 26, 2023. No significant   traction bronchiectasis or honeycombing. Band type opacity within the   right lower lobe likely related to platelike atelectasis/scarring.  PLEURA: No pleural effusion. No pneumothorax  MEDIASTINUM AND NIRMALA: No lymphadenopathy.  VESSELS: Within normal limits.  HEART: Cardiomegaly. No pericardial effusion.  CHEST WALL AND LOWER NECK: Within normal limits.  VISUALIZED UPPER ABDOMEN: Gallstones identified in the region of the   gallbladder neck. No gallbladder wall thickening noted. The visualized   portion of the adrenal glands are unremarkable.  BONES: Degenerative changes of the thoracic spine noted.    IMPRESSION:  Since prior evaluation June 2023 there are new predominantly   peripherally distributed reticular opacities identified within the   bilateral lung parenchyma, which appear less prominent than demonstrated   on August 26, 2023. No significant traction bronchiectasis or   honeycombing.          --- End of Report ---            SEAMUS HERNANDEZ MD; Attending Radiologist  This document has been electronically signed. 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 S/p pulse steroids, PLEX (9/15, 9/18, 9/20) and Rituximab (9/16 & 10/3). Course c/b severe leukopenia s/p filgastrim, shock liver with slow to resolved hyperbilirubinemia, RIJ DVT, oropharyngeal dysphagia, and recurrent SVT. Repeat CT chest on 9/30 with markedly improved bilateral groundglass opacities with resolved lung consolidations. Now in acute rehab on 4 lpm NC oxygen and tapering dose of medrol.     Problem List:    -  Acute Hypoxic Resp failure   -  Interstitial lung disease   -  Mixed connective tissue disease   -  Acute hypoxia  -  RIJ DVT     Plan:  - Hypoxic episodes while asleep probable due to JACEY   - Continue supp O2 at night   - WBC elevation due to steroid  - Cont. Methylprednisolone PO, taper as per rheumatology recs. from [facility]   - Continue atovaquone for PCP prophylaxis  - S/p Rituximab 9/16 and 10/3  - S/p PLEX during MICU stay  - CT chest result as above   - Cont. anticoagulation for DVT associated with ECMO cannula  - Consider incentive spirometry   - Care per primary team  - Discussed with spouse at bedside   - PT OOB as tolerated  - Outpatient pulmonary and rheumatology follow up upon discharge      spoke with partner at bedside

## 2023-11-11 PROCEDURE — 99232 SBSQ HOSP IP/OBS MODERATE 35: CPT

## 2023-11-11 RX ADMIN — ATOVAQUONE 1500 MILLIGRAM(S): 750 SUSPENSION ORAL at 16:47

## 2023-11-11 RX ADMIN — Medication 1 TABLET(S): at 16:47

## 2023-11-11 RX ADMIN — Medication 1 DROP(S): at 10:34

## 2023-11-11 RX ADMIN — GABAPENTIN 300 MILLIGRAM(S): 400 CAPSULE ORAL at 10:11

## 2023-11-11 RX ADMIN — APIXABAN 5 MILLIGRAM(S): 2.5 TABLET, FILM COATED ORAL at 21:00

## 2023-11-11 RX ADMIN — SODIUM CHLORIDE 5 MILLILITER(S): 9 INJECTION INTRAMUSCULAR; INTRAVENOUS; SUBCUTANEOUS at 09:18

## 2023-11-11 RX ADMIN — Medication 500 MILLIGRAM(S): at 16:46

## 2023-11-11 RX ADMIN — SODIUM CHLORIDE 5 MILLILITER(S): 9 INJECTION INTRAMUSCULAR; INTRAVENOUS; SUBCUTANEOUS at 19:46

## 2023-11-11 RX ADMIN — GABAPENTIN 300 MILLIGRAM(S): 400 CAPSULE ORAL at 16:47

## 2023-11-11 RX ADMIN — Medication 25 MILLIGRAM(S): at 20:59

## 2023-11-11 RX ADMIN — APIXABAN 5 MILLIGRAM(S): 2.5 TABLET, FILM COATED ORAL at 10:11

## 2023-11-11 RX ADMIN — Medication 1 APPLICATION(S): at 19:46

## 2023-11-11 RX ADMIN — GABAPENTIN 300 MILLIGRAM(S): 400 CAPSULE ORAL at 21:01

## 2023-11-11 RX ADMIN — ZINC OXIDE 1 APPLICATION(S): 200 OINTMENT TOPICAL at 21:01

## 2023-11-11 RX ADMIN — Medication 1 MILLIGRAM(S): at 16:46

## 2023-11-11 RX ADMIN — Medication 25 MILLIGRAM(S): at 10:11

## 2023-11-11 RX ADMIN — Medication 1 TABLET(S): at 10:11

## 2023-11-11 RX ADMIN — Medication 6 MILLIGRAM(S): at 21:01

## 2023-11-11 RX ADMIN — NYSTATIN CREAM 1 APPLICATION(S): 100000 CREAM TOPICAL at 09:18

## 2023-11-11 RX ADMIN — PANTOPRAZOLE SODIUM 40 MILLIGRAM(S): 20 TABLET, DELAYED RELEASE ORAL at 10:11

## 2023-11-11 RX ADMIN — Medication 44 MILLIGRAM(S): at 10:12

## 2023-11-11 RX ADMIN — Medication 5 MILLIGRAM(S): at 10:11

## 2023-11-11 RX ADMIN — ZINC OXIDE 1 APPLICATION(S): 200 OINTMENT TOPICAL at 09:18

## 2023-11-11 RX ADMIN — Medication 1 APPLICATION(S): at 09:17

## 2023-11-11 RX ADMIN — NYSTATIN CREAM 1 APPLICATION(S): 100000 CREAM TOPICAL at 19:47

## 2023-11-11 NOTE — PROGRESS NOTE ADULT - SUBJECTIVE AND OBJECTIVE BOX
Cc: Gait dysfunction    HPI: Patient with no new medical issues today. States she slept well overnight, had a small BM this morning.   Pain controlled, no chest pain, no N/V, no Fevers/Chills. No other new ROS  Has been tolerating rehabilitation program.    albuterol/ipratropium for Nebulization 3 milliLiter(s) Nebulizer every 6 hours PRN  ALPRAZolam 0.25 milliGRAM(s) Oral every 6 hours PRN  aluminum hydroxide/magnesium hydroxide/simethicone Suspension 30 milliLiter(s) Oral every 4 hours PRN  ammonium lactate 12% Lotion 1 Application(s) Topical every 12 hours  apixaban 5 milliGRAM(s) Oral every 12 hours  artificial  tears Solution 1 Drop(s) Both EYES every 12 hours  ascorbic acid 500 milliGRAM(s) Oral daily  atovaquone  Suspension 1500 milliGRAM(s) Oral daily  benzocaine/menthol Lozenge 1 Lozenge Oral two times a day PRN  benzonatate 100 milliGRAM(s) Oral three times a day PRN  bisacodyl 5 milliGRAM(s) Oral <User Schedule>  dextrose 5%. 1000 milliLiter(s) IV Continuous <Continuous>  dextrose 5%. 1000 milliLiter(s) IV Continuous <Continuous>  dextrose 50% Injectable 12.5 Gram(s) IV Push once  dextrose 50% Injectable 25 Gram(s) IV Push once  dextrose 50% Injectable 25 Gram(s) IV Push once  dextrose Oral Gel 15 Gram(s) Oral once PRN  folic acid 1 milliGRAM(s) Oral daily  gabapentin 300 milliGRAM(s) Oral three times a day  glucagon  Injectable 1 milliGRAM(s) IntraMuscular once  hemorrhoidal Ointment 1 Application(s) Rectal four times a day  hydrocortisone hemorrhoidal Suppository 1 Suppository(s) Rectal two times a day  lactobacillus acidophilus 1 Tablet(s) Oral two times a day with meals  loperamide 2 milliGRAM(s) Oral three times a day PRN  melatonin 6 milliGRAM(s) Oral at bedtime  methylPREDNISolone   Oral   methylPREDNISolone 44 milliGRAM(s) Oral daily  metoprolol tartrate 25 milliGRAM(s) Oral two times a day  multivitamin 1 Tablet(s) Oral daily  nystatin Powder 1 Application(s) Topical two times a day  pantoprazole    Tablet 40 milliGRAM(s) Oral before breakfast  polyethylene glycol 3350 17 Gram(s) Oral daily PRN  psyllium Powder 1 Packet(s) Oral daily  SIMETHICONE SOFTGELS 250 milliGRAM(s) 250 milliGRAM(s) Oral three times a day PRN  sodium chloride 0.65% Nasal 1 Spray(s) Both Nostrils every 8 hours PRN  sodium chloride 0.9% lock flush 5 milliLiter(s) IV Push two times a day  zinc oxide 40% Paste 1 Application(s) Topical three times a day      T(C): 36.7 (11-10-23 @ 20:02), Max: 36.7 (11-10-23 @ 20:02)  HR: 73 (11-10-23 @ 20:02) (73 - 73)  BP: 115/70 (11-10-23 @ 20:02) (115/70 - 115/70)  RR: 16 (11-10-23 @ 20:02) (16 - 16)  SpO2: 93% (11-10-23 @ 20:02) (93% - 93%)    In NAD  HEENT- EOMI  Heart- RRR, S1S2  Lungs- CTA bl.  Abd- + BS, NT  Ext- No calf pain  Neuro- Exam unchanged          Imp: Patient with diagnosis of    Interstitial Lung Disease      admitted for comprehensive acute rehabilitation.    Plan:  - Continue PT/OT/SLP  - DVT prophylaxis - apixaban   - Skin- Turn q2h, check skin daily - continue care to thigh, groin, buttock and abdomen pressure injuries.   - Continue current medications; patient medically stable.   - constipation - continue current bowel regimen.   - neuropathy - continue low dose gabapentin, can consider increasing as tolerated. Patient does not report sedation or other adverse effects   - Patient is stable to continue current rehabilitation program.

## 2023-11-11 NOTE — PROGRESS NOTE ADULT - SUBJECTIVE AND OBJECTIVE BOX
Time of Visit:  Patient seen and examined. pat is lying in bed comfortable     MEDICATIONS  (STANDING):  ammonium lactate 12% Lotion 1 Application(s) Topical every 12 hours  apixaban 5 milliGRAM(s) Oral every 12 hours  artificial  tears Solution 1 Drop(s) Both EYES every 12 hours  ascorbic acid 500 milliGRAM(s) Oral daily  atovaquone  Suspension 1500 milliGRAM(s) Oral daily  bisacodyl 5 milliGRAM(s) Oral <User Schedule>  dextrose 5%. 1000 milliLiter(s) (100 mL/Hr) IV Continuous <Continuous>  dextrose 5%. 1000 milliLiter(s) (50 mL/Hr) IV Continuous <Continuous>  dextrose 50% Injectable 12.5 Gram(s) IV Push once  dextrose 50% Injectable 25 Gram(s) IV Push once  dextrose 50% Injectable 25 Gram(s) IV Push once  folic acid 1 milliGRAM(s) Oral daily  gabapentin 300 milliGRAM(s) Oral three times a day  glucagon  Injectable 1 milliGRAM(s) IntraMuscular once  hemorrhoidal Ointment 1 Application(s) Rectal four times a day  hydrocortisone hemorrhoidal Suppository 1 Suppository(s) Rectal two times a day  lactobacillus acidophilus 1 Tablet(s) Oral two times a day with meals  melatonin 6 milliGRAM(s) Oral at bedtime  methylPREDNISolone   Oral   methylPREDNISolone 44 milliGRAM(s) Oral daily  metoprolol tartrate 25 milliGRAM(s) Oral two times a day  multivitamin 1 Tablet(s) Oral daily  nystatin Powder 1 Application(s) Topical two times a day  pantoprazole    Tablet 40 milliGRAM(s) Oral before breakfast  psyllium Powder 1 Packet(s) Oral daily  sodium chloride 0.9% lock flush 5 milliLiter(s) IV Push two times a day  zinc oxide 40% Paste 1 Application(s) Topical three times a day      MEDICATIONS  (PRN):  albuterol/ipratropium for Nebulization 3 milliLiter(s) Nebulizer every 6 hours PRN Shortness of Breath and/or Wheezing  ALPRAZolam 0.25 milliGRAM(s) Oral every 6 hours PRN Anxiety  aluminum hydroxide/magnesium hydroxide/simethicone Suspension 30 milliLiter(s) Oral every 4 hours PRN Dyspepsia  benzocaine/menthol Lozenge 1 Lozenge Oral two times a day PRN Sore Throat  benzonatate 100 milliGRAM(s) Oral three times a day PRN Cough  dextrose Oral Gel 15 Gram(s) Oral once PRN Blood Glucose LESS THAN 70 milliGRAM(s)/deciliter  loperamide 2 milliGRAM(s) Oral three times a day PRN Diarrhea  polyethylene glycol 3350 17 Gram(s) Oral daily PRN Constipation  SIMETHICONE SOFTGELS 250 milliGRAM(s) 250 milliGRAM(s) Oral three times a day PRN for gas  sodium chloride 0.65% Nasal 1 Spray(s) Both Nostrils every 8 hours PRN Nasal Congestion       Medications up to date at time of exam.      PHYSICAL EXAMINATION:  Patient has no new complaints.  GENERAL: The patient is a well-developed, well-nourished, in no apparent distress.     Vital Signs Last 24 Hrs  T(C): 36.7 (10 Nov 2023 20:02), Max: 36.7 (10 Nov 2023 20:02)  T(F): 98.1 (10 Nov 2023 20:02), Max: 98.1 (10 Nov 2023 20:02)  HR: 73 (10 Nov 2023 20:02) (73 - 73)  BP: 115/70 (10 Nov 2023 20:02) (115/70 - 115/70)  BP(mean): --  RR: 16 (10 Nov 2023 20:02) (16 - 16)  SpO2: 93% (10 Nov 2023 20:02) (93% - 93%)    Parameters below as of 10 Nov 2023 20:02  Patient On (Oxygen Delivery Method): nasal cannula  O2 Flow (L/min): 0.5     (if applicable)    Chest Tube (if applicable)    HEENT: Head is normocephalic and atraumatic. Extraocular muscles are intact. Mucous membranes are moist.     NECK: Supple, no palpable adenopathy.    LUNGS: Clear to auscultation, no wheezing, rales, or rhonchi.    HEART: Regular rate and rhythm without murmur.    ABDOMEN: Soft, nontender, and nondistended.  No hepatosplenomegaly is noted.    : No painful voiding, no pelvic pain    EXTREMITIES: Without any cyanosis, clubbing, rash, lesions or edema.    NEUROLOGIC: Awake, alert, oriented, motor 4/5 x 4     SKIN: Warm, dry, good turgor.      LABS:    Medications up to date at time of exam.      PHYSICAL EXAMINATION:  Patient has no new complaints.  GENERAL: The patient is a well-developed, well-nourished, in no apparent distress.     Vital Signs Last 24 Hrs  T(C): 36.7 (10 Nov 2023 20:02), Max: 36.7 (10 Nov 2023 20:02)  T(F): 98.1 (10 Nov 2023 20:02), Max: 98.1 (10 Nov 2023 20:02)  HR: 73 (10 Nov 2023 20:02) (73 - 73)  BP: 115/70 (10 Nov 2023 20:02) (115/70 - 115/70)  BP(mean): --  RR: 16 (10 Nov 2023 20:02) (16 - 16)  SpO2: 93% (10 Nov 2023 20:02) (93% - 93%)    Parameters below as of 10 Nov 2023 20:02  Patient On (Oxygen Delivery Method): nasal cannula  O2 Flow (L/min): 0.5     (if applicable)    Chest Tube (if applicable)    HEENT: Head is normocephalic and atraumatic. Extraocular muscles are intact. Mucous membranes are moist.     NECK: Supple, no palpable adenopathy.    LUNGS: Clear to auscultation, no wheezing, rales, or rhonchi.    HEART: Regular rate and rhythm without murmur.    ABDOMEN: Soft, nontender, and nondistended.  No hepatosplenomegaly is noted.    : No painful voiding, no pelvic pain    EXTREMITIES: Without any cyanosis, clubbing, rash, lesions or edema.    NEUROLOGIC: Awake, alert, oriented, grossly intact    SKIN: Warm, dry, good turgor.      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, concerning for DAH vs ILD flare, decannulated from VV-ECMO 9/21, extubated 9/22. S/p pulse steroids, PLEX (9/15, 9/18, 9/20) and Rituximab (9/16 & 10/3). Course c/b severe leukopenia s/p filgastrim, shock liver with slow to resolved hyperbilirubinemia, RIJ DVT, oropharyngeal dysphagia, and recurrent SVT. Repeat CT chest on 9/30 with markedly improved bilateral groundglass opacities with resolved lung consolidations. Now in acute rehab on 4 lpm NC oxygen and tapering dose of medrol.     Problem List:    -  Acute Hypoxic Resp failure   -  Interstitial lung disease   -  Mixed connective tissue disease   -  Acute hypoxia  -  RIJ DVT     Plan:  - Repeat CT chest 11/11 noted.. refer to report for details   - Hypoxic episodes while asleep probable due to JACEY   - Continue supp O2 at night   - WBC elevation due to steroid  - Cont. Methylprednisolone PO, taper as per rheumatology recs. from [facility]   - Continue atovaquone for PCP prophylaxis  - S/p Rituximab 9/16 and 10/3  - S/p PLEX during MICU stay  - Cont. anticoagulation for DVT associated with ECMO cannula  - Consider incentive spirometry   - Care per primary team  - Discussed with spouse at bedside   - PT OOB as tolerated  - Outpatient pulmonary and rheumatology follow up upon discharge

## 2023-11-12 PROCEDURE — 99232 SBSQ HOSP IP/OBS MODERATE 35: CPT

## 2023-11-12 RX ADMIN — GABAPENTIN 300 MILLIGRAM(S): 400 CAPSULE ORAL at 21:24

## 2023-11-12 RX ADMIN — APIXABAN 5 MILLIGRAM(S): 2.5 TABLET, FILM COATED ORAL at 09:59

## 2023-11-12 RX ADMIN — Medication 500 MILLIGRAM(S): at 14:19

## 2023-11-12 RX ADMIN — GABAPENTIN 300 MILLIGRAM(S): 400 CAPSULE ORAL at 09:08

## 2023-11-12 RX ADMIN — ATOVAQUONE 1500 MILLIGRAM(S): 750 SUSPENSION ORAL at 14:19

## 2023-11-12 RX ADMIN — Medication 1 DROP(S): at 09:11

## 2023-11-12 RX ADMIN — Medication 6 MILLIGRAM(S): at 21:24

## 2023-11-12 RX ADMIN — Medication 1 TABLET(S): at 14:19

## 2023-11-12 RX ADMIN — SODIUM CHLORIDE 5 MILLILITER(S): 9 INJECTION INTRAMUSCULAR; INTRAVENOUS; SUBCUTANEOUS at 07:31

## 2023-11-12 RX ADMIN — Medication 1 APPLICATION(S): at 07:30

## 2023-11-12 RX ADMIN — Medication 1 MILLIGRAM(S): at 14:19

## 2023-11-12 RX ADMIN — Medication 44 MILLIGRAM(S): at 09:08

## 2023-11-12 RX ADMIN — PANTOPRAZOLE SODIUM 40 MILLIGRAM(S): 20 TABLET, DELAYED RELEASE ORAL at 09:08

## 2023-11-12 RX ADMIN — Medication 1 TABLET(S): at 21:24

## 2023-11-12 RX ADMIN — SODIUM CHLORIDE 5 MILLILITER(S): 9 INJECTION INTRAMUSCULAR; INTRAVENOUS; SUBCUTANEOUS at 18:41

## 2023-11-12 RX ADMIN — ZINC OXIDE 1 APPLICATION(S): 200 OINTMENT TOPICAL at 22:50

## 2023-11-12 RX ADMIN — Medication 1 TABLET(S): at 09:10

## 2023-11-12 RX ADMIN — Medication 25 MILLIGRAM(S): at 21:24

## 2023-11-12 RX ADMIN — GABAPENTIN 300 MILLIGRAM(S): 400 CAPSULE ORAL at 14:19

## 2023-11-12 RX ADMIN — APIXABAN 5 MILLIGRAM(S): 2.5 TABLET, FILM COATED ORAL at 21:24

## 2023-11-12 RX ADMIN — Medication 1 DROP(S): at 21:23

## 2023-11-12 RX ADMIN — Medication 1 APPLICATION(S): at 18:42

## 2023-11-12 RX ADMIN — NYSTATIN CREAM 1 APPLICATION(S): 100000 CREAM TOPICAL at 07:30

## 2023-11-12 RX ADMIN — ZINC OXIDE 1 APPLICATION(S): 200 OINTMENT TOPICAL at 14:09

## 2023-11-12 RX ADMIN — Medication 25 MILLIGRAM(S): at 09:08

## 2023-11-12 RX ADMIN — ZINC OXIDE 1 APPLICATION(S): 200 OINTMENT TOPICAL at 07:31

## 2023-11-12 NOTE — PROGRESS NOTE ADULT - SUBJECTIVE AND OBJECTIVE BOX
Cc: Gait dysfunction    HPI: Patient with no new medical issues today. States she slept well overnight, had a small BM this morning. Expresses concern that she has multiple, small, bowel movements a day, instead of having 1-2 medium/large BMs. However expresses that she has always had "issues" with bowel movements. Denies new abd pain or changes in bowel habits. Has some mild pain at right posterior scalp only when touching the area. No HA, vision changes, dizziness.   Pain controlled, no chest pain, no N/V, no Fevers/Chills. No other new ROS  Has been tolerating rehabilitation program.    albuterol/ipratropium for Nebulization 3 milliLiter(s) Nebulizer every 6 hours PRN  ALPRAZolam 0.25 milliGRAM(s) Oral every 6 hours PRN  aluminum hydroxide/magnesium hydroxide/simethicone Suspension 30 milliLiter(s) Oral every 4 hours PRN  ammonium lactate 12% Lotion 1 Application(s) Topical every 12 hours  apixaban 5 milliGRAM(s) Oral every 12 hours  artificial  tears Solution 1 Drop(s) Both EYES every 12 hours  ascorbic acid 500 milliGRAM(s) Oral daily  atovaquone  Suspension 1500 milliGRAM(s) Oral daily  benzocaine/menthol Lozenge 1 Lozenge Oral two times a day PRN  benzonatate 100 milliGRAM(s) Oral three times a day PRN  bisacodyl 5 milliGRAM(s) Oral <User Schedule>  dextrose 5%. 1000 milliLiter(s) IV Continuous <Continuous>  dextrose 5%. 1000 milliLiter(s) IV Continuous <Continuous>  dextrose 50% Injectable 12.5 Gram(s) IV Push once  dextrose 50% Injectable 25 Gram(s) IV Push once  dextrose 50% Injectable 25 Gram(s) IV Push once  dextrose Oral Gel 15 Gram(s) Oral once PRN  folic acid 1 milliGRAM(s) Oral daily  gabapentin 300 milliGRAM(s) Oral three times a day  glucagon  Injectable 1 milliGRAM(s) IntraMuscular once  hemorrhoidal Ointment 1 Application(s) Rectal four times a day  hydrocortisone hemorrhoidal Suppository 1 Suppository(s) Rectal two times a day  lactobacillus acidophilus 1 Tablet(s) Oral two times a day with meals  loperamide 2 milliGRAM(s) Oral three times a day PRN  melatonin 6 milliGRAM(s) Oral at bedtime  methylPREDNISolone   Oral   methylPREDNISolone 44 milliGRAM(s) Oral daily  metoprolol tartrate 25 milliGRAM(s) Oral two times a day  multivitamin 1 Tablet(s) Oral daily  nystatin Powder 1 Application(s) Topical two times a day  pantoprazole    Tablet 40 milliGRAM(s) Oral before breakfast  polyethylene glycol 3350 17 Gram(s) Oral daily PRN  psyllium Powder 1 Packet(s) Oral daily  SIMETHICONE SOFTGELS 250 milliGRAM(s) 250 milliGRAM(s) Oral three times a day PRN  sodium chloride 0.65% Nasal 1 Spray(s) Both Nostrils every 8 hours PRN  sodium chloride 0.9% lock flush 5 milliLiter(s) IV Push two times a day  zinc oxide 40% Paste 1 Application(s) Topical three times a day      Vital Signs Last 24 Hrs  T(C): 36.7 (11 Nov 2023 20:42), Max: 36.7 (11 Nov 2023 20:42)  T(F): 98 (11 Nov 2023 20:42), Max: 98 (11 Nov 2023 20:42)  HR: 84 (11 Nov 2023 20:42) (84 - 84)  BP: 120/71 (11 Nov 2023 20:42) (120/71 - 120/71)  BP(mean): --  RR: 16 (11 Nov 2023 20:42) (16 - 16)  SpO2: 96% (11 Nov 2023 20:42) (96% - 96%)    Parameters below as of 11 Nov 2023 20:42  Patient On (Oxygen Delivery Method): nasal cannula  O2 Flow (L/min): 0.5      In NAD  HEENT- EOMI, mild TTP over right posterior scalp in greater occipital nerve territory.   Heart- RRR, S1S2  Lungs- CTA bl.  Abd- + BS, NT  Ext- No calf pain  Neuro- Exam unchanged          Imp: Patient with diagnosis of    Interstitial Lung Disease      admitted for comprehensive acute rehabilitation.    Plan:  - Continue PT/OT/SLP  - DVT prophylaxis - apixaban   - Skin- Turn q2h, check skin daily - continue care to thigh, groin, buttock and abdomen pressure injuries.   - Continue current medications; patient medically stable.   - constipation - continue current bowel regimen.   - neuropathy - continue low dose gabapentin, can consider increasing as tolerated. Patient does not report sedation or other adverse effects   - scalp pain - likely occipital neuralgia. Patient declines medication at this time, states it only hurts when she presses on it.   - Patient is stable to continue current rehabilitation program.

## 2023-11-12 NOTE — PROGRESS NOTE ADULT - ASSESSMENT
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   - pulm reccs appreciated  -c/w steroids - taper as scheduled (2 week intervals)   -c/w nebs  -c/w PT/OT  -Repeat CT chest planned for 1/11 - routine follow-up  -Mepron for PCP ppx while on steroids     #PAF  #Non-occlusive right IJ thrombus   -c/w Eliquis  -c/w lopressor    #Anxiety  -appreciate psych follow-up  -c/w Xanax PRN    #Transaminitis  -improving.    #DVT ppx: Eliquis

## 2023-11-12 NOTE — PROGRESS NOTE ADULT - SUBJECTIVE AND OBJECTIVE BOX
no acute events overnight      PHYSICAL EXAM:    Vital Signs Last 24 Hrs  T(F): 98 (11 Nov 2023 20:42), Max: 98 (11 Nov 2023 20:42)  HR: 84 (11 Nov 2023 20:42) (84 - 84)  BP: 120/71 (11 Nov 2023 20:42) (120/71 - 120/71)  RR: 16 (11 Nov 2023 20:42) (16 - 16)  SpO2: 96% (11 Nov 2023 20:42) (96% - 96%)  I&O's Summary      GENERAL: NAD  HEENT: NCAT  CHEST/LUNG: No increased WOB, Clear to percussion bilaterally; No rales, rhonchi, wheezing  HEART: Regular rate and rhythm; No murmurs  ABDOMEN: Soft, Nontender, Nondistended; Bowel sounds present  MUSCULOSKELETAL/EXTREMITIES:  2+ Peripheral Pulses, No LE edema  PSYCH: Appropriate affect, Alert & Oriented x 3    LABS:                        10.2   11.30 )-----------( 438      ( 09 Nov 2023 10:00 )             33.0       11-09    138  |  102  |  21  ----------------------------<  121  3.4   |  30  |  0.46    Ca    9.4      09 Nov 2023 10:00    TPro  5.3  /  Alb  2.6  /  TBili  1.1  /  DBili  x   /  AST  33  /  ALT  80  /  AlkPhos  263  11-09                09-25 Chol -- LDL -- HDL -- Trig 199 mg/dL                  Urinalysis Basic - ( 09 Nov 2023 10:00 )    Color: x / Appearance: x / SG: x / pH: x  Gluc: 121 mg/dL / Ketone: x  / Bili: x / Urobili: x   Blood: x / Protein: x / Nitrite: x   Leuk Esterase: x / RBC: x / WBC x   Sq Epi: x / Non Sq Epi: x / Bacteria: x            MEDICATIONS  (STANDING):  ammonium lactate 12% Lotion 1 Application(s) Topical every 12 hours  apixaban 5 milliGRAM(s) Oral every 12 hours  artificial  tears Solution 1 Drop(s) Both EYES every 12 hours  ascorbic acid 500 milliGRAM(s) Oral daily  atovaquone  Suspension 1500 milliGRAM(s) Oral daily  bisacodyl 5 milliGRAM(s) Oral <User Schedule>  dextrose 5%. 1000 milliLiter(s) (100 mL/Hr) IV Continuous <Continuous>  dextrose 5%. 1000 milliLiter(s) (50 mL/Hr) IV Continuous <Continuous>  dextrose 50% Injectable 12.5 Gram(s) IV Push once  dextrose 50% Injectable 25 Gram(s) IV Push once  dextrose 50% Injectable 25 Gram(s) IV Push once  folic acid 1 milliGRAM(s) Oral daily  gabapentin 300 milliGRAM(s) Oral three times a day  glucagon  Injectable 1 milliGRAM(s) IntraMuscular once  hemorrhoidal Ointment 1 Application(s) Rectal four times a day  hydrocortisone hemorrhoidal Suppository 1 Suppository(s) Rectal two times a day  lactobacillus acidophilus 1 Tablet(s) Oral two times a day with meals  melatonin 6 milliGRAM(s) Oral at bedtime  methylPREDNISolone 44 milliGRAM(s) Oral daily  methylPREDNISolone   Oral   metoprolol tartrate 25 milliGRAM(s) Oral two times a day  multivitamin 1 Tablet(s) Oral daily  nystatin Powder 1 Application(s) Topical two times a day  pantoprazole    Tablet 40 milliGRAM(s) Oral before breakfast  psyllium Powder 1 Packet(s) Oral daily  sodium chloride 0.9% lock flush 5 milliLiter(s) IV Push two times a day  zinc oxide 40% Paste 1 Application(s) Topical three times a day    MEDICATIONS  (PRN):  albuterol/ipratropium for Nebulization 3 milliLiter(s) Nebulizer every 6 hours PRN Shortness of Breath and/or Wheezing  ALPRAZolam 0.25 milliGRAM(s) Oral every 6 hours PRN Anxiety  aluminum hydroxide/magnesium hydroxide/simethicone Suspension 30 milliLiter(s) Oral every 4 hours PRN Dyspepsia  benzocaine/menthol Lozenge 1 Lozenge Oral two times a day PRN Sore Throat  benzonatate 100 milliGRAM(s) Oral three times a day PRN Cough  dextrose Oral Gel 15 Gram(s) Oral once PRN Blood Glucose LESS THAN 70 milliGRAM(s)/deciliter  loperamide 2 milliGRAM(s) Oral three times a day PRN Diarrhea  polyethylene glycol 3350 17 Gram(s) Oral daily PRN Constipation  SIMETHICONE SOFTGELS 250 milliGRAM(s) 250 milliGRAM(s) Oral three times a day PRN for gas  sodium chloride 0.65% Nasal 1 Spray(s) Both Nostrils every 8 hours PRN Nasal Congestion      Care Discussed with Consultants/Other Providers: Yes

## 2023-11-13 LAB
ALBUMIN SERPL ELPH-MCNC: 2.8 G/DL — LOW (ref 3.3–5)
ALBUMIN SERPL ELPH-MCNC: 2.8 G/DL — LOW (ref 3.3–5)
ALP SERPL-CCNC: 344 U/L — HIGH (ref 40–120)
ALP SERPL-CCNC: 344 U/L — HIGH (ref 40–120)
ALT FLD-CCNC: 127 U/L — HIGH (ref 10–45)
ALT FLD-CCNC: 127 U/L — HIGH (ref 10–45)
ANION GAP SERPL CALC-SCNC: 8 MMOL/L — SIGNIFICANT CHANGE UP (ref 5–17)
ANION GAP SERPL CALC-SCNC: 8 MMOL/L — SIGNIFICANT CHANGE UP (ref 5–17)
AST SERPL-CCNC: 66 U/L — HIGH (ref 10–40)
AST SERPL-CCNC: 66 U/L — HIGH (ref 10–40)
BASOPHILS # BLD AUTO: 0.2 K/UL — SIGNIFICANT CHANGE UP (ref 0–0.2)
BASOPHILS # BLD AUTO: 0.2 K/UL — SIGNIFICANT CHANGE UP (ref 0–0.2)
BASOPHILS NFR BLD AUTO: 1 % — SIGNIFICANT CHANGE UP (ref 0–2)
BASOPHILS NFR BLD AUTO: 1 % — SIGNIFICANT CHANGE UP (ref 0–2)
BILIRUB SERPL-MCNC: 1.2 MG/DL — SIGNIFICANT CHANGE UP (ref 0.2–1.2)
BILIRUB SERPL-MCNC: 1.2 MG/DL — SIGNIFICANT CHANGE UP (ref 0.2–1.2)
BUN SERPL-MCNC: 20 MG/DL — SIGNIFICANT CHANGE UP (ref 7–23)
BUN SERPL-MCNC: 20 MG/DL — SIGNIFICANT CHANGE UP (ref 7–23)
CALCIUM SERPL-MCNC: 9.2 MG/DL — SIGNIFICANT CHANGE UP (ref 8.4–10.5)
CALCIUM SERPL-MCNC: 9.2 MG/DL — SIGNIFICANT CHANGE UP (ref 8.4–10.5)
CHLORIDE SERPL-SCNC: 98 MMOL/L — SIGNIFICANT CHANGE UP (ref 96–108)
CHLORIDE SERPL-SCNC: 98 MMOL/L — SIGNIFICANT CHANGE UP (ref 96–108)
CO2 SERPL-SCNC: 28 MMOL/L — SIGNIFICANT CHANGE UP (ref 22–31)
CO2 SERPL-SCNC: 28 MMOL/L — SIGNIFICANT CHANGE UP (ref 22–31)
CREAT SERPL-MCNC: 0.64 MG/DL — SIGNIFICANT CHANGE UP (ref 0.5–1.3)
CREAT SERPL-MCNC: 0.64 MG/DL — SIGNIFICANT CHANGE UP (ref 0.5–1.3)
EGFR: 99 ML/MIN/1.73M2 — SIGNIFICANT CHANGE UP
EGFR: 99 ML/MIN/1.73M2 — SIGNIFICANT CHANGE UP
ELLIPTOCYTES BLD QL SMEAR: SLIGHT — SIGNIFICANT CHANGE UP
ELLIPTOCYTES BLD QL SMEAR: SLIGHT — SIGNIFICANT CHANGE UP
EOSINOPHIL # BLD AUTO: 0 K/UL — SIGNIFICANT CHANGE UP (ref 0–0.5)
EOSINOPHIL # BLD AUTO: 0 K/UL — SIGNIFICANT CHANGE UP (ref 0–0.5)
EOSINOPHIL NFR BLD AUTO: 0 % — SIGNIFICANT CHANGE UP (ref 0–6)
EOSINOPHIL NFR BLD AUTO: 0 % — SIGNIFICANT CHANGE UP (ref 0–6)
GLUCOSE SERPL-MCNC: 167 MG/DL — HIGH (ref 70–99)
GLUCOSE SERPL-MCNC: 167 MG/DL — HIGH (ref 70–99)
HCT VFR BLD CALC: 36.4 % — SIGNIFICANT CHANGE UP (ref 34.5–45)
HCT VFR BLD CALC: 36.4 % — SIGNIFICANT CHANGE UP (ref 34.5–45)
HGB BLD-MCNC: 11.2 G/DL — LOW (ref 11.5–15.5)
HGB BLD-MCNC: 11.2 G/DL — LOW (ref 11.5–15.5)
LYMPHOCYTES # BLD AUTO: 0.2 K/UL — LOW (ref 1–3.3)
LYMPHOCYTES # BLD AUTO: 0.2 K/UL — LOW (ref 1–3.3)
LYMPHOCYTES # BLD AUTO: 1 % — LOW (ref 13–44)
LYMPHOCYTES # BLD AUTO: 1 % — LOW (ref 13–44)
MANUAL SMEAR VERIFICATION: SIGNIFICANT CHANGE UP
MANUAL SMEAR VERIFICATION: SIGNIFICANT CHANGE UP
MCHC RBC-ENTMCNC: 28.9 PG — SIGNIFICANT CHANGE UP (ref 27–34)
MCHC RBC-ENTMCNC: 28.9 PG — SIGNIFICANT CHANGE UP (ref 27–34)
MCHC RBC-ENTMCNC: 30.8 GM/DL — LOW (ref 32–36)
MCHC RBC-ENTMCNC: 30.8 GM/DL — LOW (ref 32–36)
MCV RBC AUTO: 93.8 FL — SIGNIFICANT CHANGE UP (ref 80–100)
MCV RBC AUTO: 93.8 FL — SIGNIFICANT CHANGE UP (ref 80–100)
MONOCYTES # BLD AUTO: 0.2 K/UL — SIGNIFICANT CHANGE UP (ref 0–0.9)
MONOCYTES # BLD AUTO: 0.2 K/UL — SIGNIFICANT CHANGE UP (ref 0–0.9)
MONOCYTES NFR BLD AUTO: 1 % — LOW (ref 2–14)
MONOCYTES NFR BLD AUTO: 1 % — LOW (ref 2–14)
NEUTROPHILS # BLD AUTO: 18.96 K/UL — HIGH (ref 1.8–7.4)
NEUTROPHILS # BLD AUTO: 18.96 K/UL — HIGH (ref 1.8–7.4)
NEUTROPHILS NFR BLD AUTO: 96 % — HIGH (ref 43–77)
NEUTROPHILS NFR BLD AUTO: 96 % — HIGH (ref 43–77)
NRBC # BLD: 0 /100 — SIGNIFICANT CHANGE UP (ref 0–0)
NRBC # BLD: 0 /100 — SIGNIFICANT CHANGE UP (ref 0–0)
OVALOCYTES BLD QL SMEAR: SLIGHT — SIGNIFICANT CHANGE UP
OVALOCYTES BLD QL SMEAR: SLIGHT — SIGNIFICANT CHANGE UP
PLAT MORPH BLD: NORMAL — SIGNIFICANT CHANGE UP
PLAT MORPH BLD: NORMAL — SIGNIFICANT CHANGE UP
PLATELET # BLD AUTO: 436 K/UL — HIGH (ref 150–400)
PLATELET # BLD AUTO: 436 K/UL — HIGH (ref 150–400)
POIKILOCYTOSIS BLD QL AUTO: SLIGHT — SIGNIFICANT CHANGE UP
POIKILOCYTOSIS BLD QL AUTO: SLIGHT — SIGNIFICANT CHANGE UP
POTASSIUM SERPL-MCNC: 4.1 MMOL/L — SIGNIFICANT CHANGE UP (ref 3.5–5.3)
POTASSIUM SERPL-MCNC: 4.1 MMOL/L — SIGNIFICANT CHANGE UP (ref 3.5–5.3)
POTASSIUM SERPL-SCNC: 4.1 MMOL/L — SIGNIFICANT CHANGE UP (ref 3.5–5.3)
POTASSIUM SERPL-SCNC: 4.1 MMOL/L — SIGNIFICANT CHANGE UP (ref 3.5–5.3)
PROT SERPL-MCNC: 5.8 G/DL — LOW (ref 6–8.3)
PROT SERPL-MCNC: 5.8 G/DL — LOW (ref 6–8.3)
RBC # BLD: 3.88 M/UL — SIGNIFICANT CHANGE UP (ref 3.8–5.2)
RBC # BLD: 3.88 M/UL — SIGNIFICANT CHANGE UP (ref 3.8–5.2)
RBC # FLD: 15.6 % — HIGH (ref 10.3–14.5)
RBC # FLD: 15.6 % — HIGH (ref 10.3–14.5)
RBC BLD AUTO: ABNORMAL
RBC BLD AUTO: ABNORMAL
SODIUM SERPL-SCNC: 134 MMOL/L — LOW (ref 135–145)
SODIUM SERPL-SCNC: 134 MMOL/L — LOW (ref 135–145)
STOMATOCYTES BLD QL SMEAR: SLIGHT — SIGNIFICANT CHANGE UP
STOMATOCYTES BLD QL SMEAR: SLIGHT — SIGNIFICANT CHANGE UP
VARIANT LYMPHS # BLD: 1 % — SIGNIFICANT CHANGE UP (ref 0–6)
VARIANT LYMPHS # BLD: 1 % — SIGNIFICANT CHANGE UP (ref 0–6)
WBC # BLD: 19.75 K/UL — HIGH (ref 3.8–10.5)
WBC # BLD: 19.75 K/UL — HIGH (ref 3.8–10.5)
WBC # FLD AUTO: 19.75 K/UL — HIGH (ref 3.8–10.5)
WBC # FLD AUTO: 19.75 K/UL — HIGH (ref 3.8–10.5)

## 2023-11-13 PROCEDURE — 99233 SBSQ HOSP IP/OBS HIGH 50: CPT

## 2023-11-13 PROCEDURE — 99232 SBSQ HOSP IP/OBS MODERATE 35: CPT

## 2023-11-13 RX ORDER — ALPRAZOLAM 0.25 MG
0.25 TABLET ORAL EVERY 6 HOURS
Refills: 0 | Status: DISCONTINUED | OUTPATIENT
Start: 2023-11-13 | End: 2023-11-15

## 2023-11-13 RX ADMIN — ZINC OXIDE 1 APPLICATION(S): 200 OINTMENT TOPICAL at 20:36

## 2023-11-13 RX ADMIN — GABAPENTIN 300 MILLIGRAM(S): 400 CAPSULE ORAL at 08:31

## 2023-11-13 RX ADMIN — ZINC OXIDE 1 APPLICATION(S): 200 OINTMENT TOPICAL at 13:28

## 2023-11-13 RX ADMIN — Medication 1 MILLIGRAM(S): at 12:51

## 2023-11-13 RX ADMIN — ATOVAQUONE 1500 MILLIGRAM(S): 750 SUSPENSION ORAL at 12:50

## 2023-11-13 RX ADMIN — Medication 6 MILLIGRAM(S): at 20:34

## 2023-11-13 RX ADMIN — ZINC OXIDE 1 APPLICATION(S): 200 OINTMENT TOPICAL at 08:41

## 2023-11-13 RX ADMIN — APIXABAN 5 MILLIGRAM(S): 2.5 TABLET, FILM COATED ORAL at 20:35

## 2023-11-13 RX ADMIN — Medication 1 DROP(S): at 20:36

## 2023-11-13 RX ADMIN — SODIUM CHLORIDE 5 MILLILITER(S): 9 INJECTION INTRAMUSCULAR; INTRAVENOUS; SUBCUTANEOUS at 08:40

## 2023-11-13 RX ADMIN — Medication 1 APPLICATION(S): at 17:38

## 2023-11-13 RX ADMIN — SODIUM CHLORIDE 5 MILLILITER(S): 9 INJECTION INTRAMUSCULAR; INTRAVENOUS; SUBCUTANEOUS at 17:39

## 2023-11-13 RX ADMIN — GABAPENTIN 300 MILLIGRAM(S): 400 CAPSULE ORAL at 13:53

## 2023-11-13 RX ADMIN — NYSTATIN CREAM 1 APPLICATION(S): 100000 CREAM TOPICAL at 17:39

## 2023-11-13 RX ADMIN — Medication 1 TABLET(S): at 20:37

## 2023-11-13 RX ADMIN — GABAPENTIN 300 MILLIGRAM(S): 400 CAPSULE ORAL at 20:35

## 2023-11-13 RX ADMIN — Medication 1 TABLET(S): at 12:59

## 2023-11-13 RX ADMIN — Medication 25 MILLIGRAM(S): at 12:50

## 2023-11-13 RX ADMIN — PANTOPRAZOLE SODIUM 40 MILLIGRAM(S): 20 TABLET, DELAYED RELEASE ORAL at 08:34

## 2023-11-13 RX ADMIN — Medication 500 MILLIGRAM(S): at 12:51

## 2023-11-13 RX ADMIN — Medication 25 MILLIGRAM(S): at 20:37

## 2023-11-13 RX ADMIN — APIXABAN 5 MILLIGRAM(S): 2.5 TABLET, FILM COATED ORAL at 08:32

## 2023-11-13 RX ADMIN — Medication 44 MILLIGRAM(S): at 08:33

## 2023-11-13 RX ADMIN — Medication 1 TABLET(S): at 08:33

## 2023-11-13 NOTE — PROGRESS NOTE ADULT - SUBJECTIVE AND OBJECTIVE BOX
SUBJECTIVE/ROS:  Patient seen and examined, at bed side Partner present  Reports altered bowel function x 1 day, with minimal volume stool  She requested GI F/U review which is being facilitated  She reports continue tingling/numbness of finger and toes, which has been long standing since her acute care admission  She is currently on increasing dose gabapentin  She is happy with continued improvement of upper extremity function    ROS--No chest or abd pain, no palpitations nausea or vomiting  Tingling/numbness of fingers   LBM 11/12--minimal volume    Therapy.  Observed in therapy--worked on LE muscle strengthening and wt bearing  Able to tolerating this without need for NC Oxygen       Further discussion on rehab goals-  Discussed with therapy supervisors and team SW  I gave detailed update on my discussion with patient and her partner with regards to their therapy goals and expectations during the acute rehab treatment  Both affirm that patient is making noticeable improvement with lower extremity function--ankle strength and knee extension  They are excited with continue UE improvement markedly improved strength at wrist and elbows), and now working on strengthening the shoulder muscles  The patient remains committed and engaging well in therapy  He       She clearly stated that her goals remain as previously discussed yesterday  She stated that rather, her partner is the one that is often pushing for goals that she (patient) feels are unrealistic  She is aware of current insurance approval of her stay till 11/13 and plan remains to send updated notes to insurance to determine subsequent approval    Vital Signs Last 24 Hrs  T(C): 36.3 (10 Nov 2023 08:52), Max: 36.3 (10 Nov 2023 08:52)  T(F): 97.3 (10 Nov 2023 08:52), Max: 97.3 (10 Nov 2023 08:52)  HR: 80 (10 Nov 2023 08:52) (80 - 80)  BP: 111/66 (10 Nov 2023 08:52) (111/66 - 111/66)  RR: 16 (10 Nov 2023 08:52) (16 - 16)  SpO2: 97% (10 Nov 2023 08:52) (97% - 97%)  O2 Parameters below as of 10 Nov 2023 08:52  Patient On (Oxygen Delivery Method): room air      PHYSICAL EXAM:  Gen -  NAD, Comfortable, no using oxy at bed side and during therapy, oxy sat >97% R/A  Neck - Supple, No limited ROM, O2 via NC  Pulm - clear  Cardiovascular - HS i and II intermittently irregular  Abdomen - Soft, non tender   Extremities - edema to b/l LE, no calf tenderness, LUE Med line    Neuro-     Cognitive - awake, alert, fully oriented, engaging, speech normal      Motor -                    LEFT    UE - 4/5                    RIGHT UE - 4/5                     LEFT    LE - 3+/5, distally and 3/5 proximal                    RIGHT LE - 3+/5   Sensory - decreased light tough sensation fingers and sole of foot, difusely  MSK: improvement with motor strength  Psychiatric - Mood stable, Affect WNL  Skin -- Left lower abdomen ruptured blister 3.0 x 1.0, stage 2 pressure injury to right buttock 4 x1 and left buttock 3x1, stage 2 pressure injury ti right inner thigh 0.2 x 0.2, right groin wound 10.5 x 2.0, Left groin wound 5 x 5, dressing in situ    MMT--Able to contract B/L upper back muscles, EF/EE 3+/5    RECENT LABS/IMAGING                        10.2   11.30 )-----------( 438      ( 09 Nov 2023 10:00 )             33.0     11-09    138  |  102  |  21  ----------------------------<  121<H>  3.4<L>   |  30  |  0.46<L>    Ca    9.4      09 Nov 2023 10:00    TPro  5.3<L>  /  Alb  2.6<L>  /  TBili  1.1  /  DBili  x   /  AST  33  /  ALT  80<H>  /  AlkPhos  263<H>  11-09            Urinalysis Basic - ( 09 Nov 2023 10:00 )    Color: x / Appearance: x / SG: x / pH: x  Gluc: 121 mg/dL / Ketone: x  / Bili: x / Urobili: x   Blood: x / Protein: x / Nitrite: x   Leuk Esterase: x / RBC: x / WBC x   Sq Epi: x / Non Sq Epi: x / Bacteria: x      MEDICATIONS  (STANDING):  ammonium lactate 12% Lotion 1 Application(s) Topical every 12 hours  apixaban 5 milliGRAM(s) Oral every 12 hours  artificial  tears Solution 1 Drop(s) Both EYES every 12 hours  ascorbic acid 500 milliGRAM(s) Oral daily  atovaquone  Suspension 1500 milliGRAM(s) Oral daily  bisacodyl 5 milliGRAM(s) Oral <User Schedule>  dextrose 5%. 1000 milliLiter(s) (50 mL/Hr) IV Continuous <Continuous>  dextrose 5%. 1000 milliLiter(s) (100 mL/Hr) IV Continuous <Continuous>  dextrose 50% Injectable 12.5 Gram(s) IV Push once  dextrose 50% Injectable 25 Gram(s) IV Push once  dextrose 50% Injectable 25 Gram(s) IV Push once  folic acid 1 milliGRAM(s) Oral daily  gabapentin 300 milliGRAM(s) Oral three times a day  glucagon  Injectable 1 milliGRAM(s) IntraMuscular once  hemorrhoidal Ointment 1 Application(s) Rectal four times a day  hydrocortisone hemorrhoidal Suppository 1 Suppository(s) Rectal two times a day  lactobacillus acidophilus 1 Tablet(s) Oral two times a day with meals  melatonin 6 milliGRAM(s) Oral at bedtime  methylPREDNISolone 44 milliGRAM(s) Oral daily  methylPREDNISolone   Oral   metoprolol tartrate 25 milliGRAM(s) Oral two times a day  multivitamin 1 Tablet(s) Oral daily  nystatin Powder 1 Application(s) Topical two times a day  pantoprazole    Tablet 40 milliGRAM(s) Oral before breakfast  psyllium Powder 1 Packet(s) Oral daily  sodium chloride 0.9% lock flush 5 milliLiter(s) IV Push two times a day  zinc oxide 40% Paste 1 Application(s) Topical three times a day    MEDICATIONS  (PRN):  albuterol/ipratropium for Nebulization 3 milliLiter(s) Nebulizer every 6 hours PRN Shortness of Breath and/or Wheezing  ALPRAZolam 0.25 milliGRAM(s) Oral every 6 hours PRN Anxiety  aluminum hydroxide/magnesium hydroxide/simethicone Suspension 30 milliLiter(s) Oral every 4 hours PRN Dyspepsia  benzocaine/menthol Lozenge 1 Lozenge Oral two times a day PRN Sore Throat  benzonatate 100 milliGRAM(s) Oral three times a day PRN Cough  dextrose Oral Gel 15 Gram(s) Oral once PRN Blood Glucose LESS THAN 70 milliGRAM(s)/deciliter  loperamide 2 milliGRAM(s) Oral three times a day PRN Diarrhea  polyethylene glycol 3350 17 Gram(s) Oral daily PRN Constipation  SIMETHICONE SOFTGELS 250 milliGRAM(s) 250 milliGRAM(s) Oral three times a day PRN for gas  sodium chloride 0.65% Nasal 1 Spray(s) Both Nostrils every 8 hours PRN Nasal Congestion      SUBJECTIVE/ROS:  Patient seen and examined, at bed side Partner present  Reports altered bowel function x 1 day, with minimal volume stool  She requested GI F/U review which is being facilitated  She reports continue tingling/numbness of finger and toes, which has been long standing since her acute care admission  She is currently on increasing dose gabapentin  She is happy with continued improvement of upper extremity function    ROS--No chest or abd pain, no palpitations nausea or vomiting  Tingling/numbness of fingers   LBM 11/12--minimal volume    Therapy.  Observed in therapy--worked on LE muscle strengthening and wt bearing  Able to tolerating this without need for NC Oxygen       Interval CT chest and pulmonary f/u appreciated  Pulmonary team is liaising with Pulmonary team at [facility] for continued care     Further discussion on rehab goals-among Rehab team  Discussed with therapy supervisors and team SW--details as noted under assessment     Vital Signs Last 24 Hrs  T(C): 36.8 (13 Nov 2023 09:44), Max: 36.8 (13 Nov 2023 09:44)  T(F): 98.3 (13 Nov 2023 09:44), Max: 98.3 (13 Nov 2023 09:44)  HR: 76 (13 Nov 2023 09:44) (76 - 77)  BP: 111/64 (12 Nov 2023 21:21) (111/64 - 111/64)  RR: 16 (13 Nov 2023 09:44) (16 - 16)  SpO2: 95% (13 Nov 2023 09:44) (95% - 96%)  O2 Parameters below as of 13 Nov 2023 09:44  Patient On (Oxygen Delivery Method): room air    PHYSICAL EXAM:  Gen -  NAD, Comfortable,  at bedside -normal oxy sat 98% on R/A  Pulm - clear  Cardiovascular - HS i and II intermittently irregular  Abdomen - Soft, non tender   Extremities - edema to b/l LE, no calf tenderness, LUE Med line    Neuro-     Cognitive - awake, alert, fully oriented, engaging, speech normal      Motor -                    LEFT    UE - 4/5                    RIGHT UE - 4/5                     LEFT    LE - 3+/5, distally and 3/5 proximal                    RIGHT LE - Ankles PF 3/5 ,DF 2/5, Knee ext 3/5 Hips limited by pain Rt hip due to ulcer at ECHO access site   Sensory - decreased light tough sensation fingers and sole of foot, difusely  MSK: improvement with motor strength  Psychiatric - Mood stable, Affect WNL  Skin -- , stage 2 pressure injury to right buttock 4 x1 and left buttock 3x1, stage 2 pressure injury ti right inner thigh 0.2 x 0.2, right groin wound 10.5 x 2.0, Left groin wound 5 x 5, dressing in situ    MMT--Able to contract B/L upper back muscles, EF/EE 3+/5    < from: CT Chest No Cont (11.10.23 @ 11:36) >   CT CHEST   ORDERED BY: URI OCHOA     PROCEDURE DATE:  11/10/2023          INTERPRETATION:  CLINICAL INFORMATION: Interstitial lung disease.   Follow-up assessment.    COMPARISON: September 30, 2023. June 14, 2023.    CONTRAST/COMPLICATIONS:  IV Contrast: None  Oral Contrast: None  Complications: Not applicable    PROCEDURE:  CT of the Chest was performed.  Sagittal and coronal reformats were performed.    FINDINGS:    LUNGS AND AIRWAYS: Central bronchial anatomy.  Since prior evaluation   June 2023 there are new predominantly peripherally distributed reticular   opacities identified within the bilateral lung parenchyma, which appear   less prominent than demonstrated on August 26, 2023. No significant   traction bronchiectasis or honeycombing. Band type opacity within the   right lower lobe likely related to platelike atelectasis/scarring.  PLEURA: No pleural effusion. No pneumothorax  MEDIASTINUM AND NIRMALA: No lymphadenopathy.  VESSELS: Within normal limits.  HEART: Cardiomegaly. No pericardial effusion.  CHEST WALL AND LOWER NECK: Within normal limits.  VISUALIZED UPPER ABDOMEN: Gallstones identified in the region of the   gallbladder neck. No gallbladder wall thickening noted. The visualized   portion of the adrenal glands are unremarkable.  BONES: Degenerative changes of the thoracic spine noted.    IMPRESSION:  Since prior evaluation June 2023 there are new predominantly   peripherally distributed reticular opacities identified within the   bilateral lung parenchyma, which appear less prominent than demonstrated   on August 26, 2023. No significant traction bronchiectasis or   honeycombing.      RECENT LABS/IMAGING                        11.2   19.75 )-----------( 436      ( 13 Nov 2023 13:05 )             36.4     11-13    134<L>  |  98  |  20  ----------------------------<  167<H>  4.1   |  28  |  0.64    Ca    9.2      13 Nov 2023 13:05    TPro  5.8<L>  /  Alb  2.8<L>  /  TBili  1.2  /  DBili  x   /  AST  66<H>  /  ALT  127<H>  /  AlkPhos  344<H>  11-13      Urinalysis Basic - ( 13 Nov 2023 13:05 )    Color: x / Appearance: x / SG: x / pH: x  Gluc: 167 mg/dL / Ketone: x  / Bili: x / Urobili: x   Blood: x / Protein: x / Nitrite: x   Leuk Esterase: x / RBC: x / WBC x   Sq Epi: x / Non Sq Epi: x / Bacteria: x      MEDICATIONS  (STANDING):  ammonium lactate 12% Lotion 1 Application(s) Topical every 12 hours  apixaban 5 milliGRAM(s) Oral every 12 hours  artificial  tears Solution 1 Drop(s) Both EYES every 12 hours  ascorbic acid 500 milliGRAM(s) Oral daily  atovaquone  Suspension 1500 milliGRAM(s) Oral daily  bisacodyl 5 milliGRAM(s) Oral <User Schedule>  dextrose 5%. 1000 milliLiter(s) (50 mL/Hr) IV Continuous <Continuous>  dextrose 5%. 1000 milliLiter(s) (100 mL/Hr) IV Continuous <Continuous>  dextrose 50% Injectable 12.5 Gram(s) IV Push once  dextrose 50% Injectable 25 Gram(s) IV Push once  dextrose 50% Injectable 25 Gram(s) IV Push once  folic acid 1 milliGRAM(s) Oral daily  gabapentin 300 milliGRAM(s) Oral three times a day  glucagon  Injectable 1 milliGRAM(s) IntraMuscular once  hemorrhoidal Ointment 1 Application(s) Rectal four times a day  hydrocortisone hemorrhoidal Suppository 1 Suppository(s) Rectal two times a day  lactobacillus acidophilus 1 Tablet(s) Oral two times a day with meals  melatonin 6 milliGRAM(s) Oral at bedtime  methylPREDNISolone   Oral   methylPREDNISolone 44 milliGRAM(s) Oral daily  metoprolol tartrate 25 milliGRAM(s) Oral two times a day  multivitamin 1 Tablet(s) Oral daily  nystatin Powder 1 Application(s) Topical two times a day  pantoprazole    Tablet 40 milliGRAM(s) Oral before breakfast  psyllium Powder 1 Packet(s) Oral daily  sodium chloride 0.9% lock flush 5 milliLiter(s) IV Push two times a day  zinc oxide 40% Paste 1 Application(s) Topical three times a day    MEDICATIONS  (PRN):  albuterol/ipratropium for Nebulization 3 milliLiter(s) Nebulizer every 6 hours PRN Shortness of Breath and/or Wheezing  ALPRAZolam 0.25 milliGRAM(s) Oral every 6 hours PRN Anxiety  aluminum hydroxide/magnesium hydroxide/simethicone Suspension 30 milliLiter(s) Oral every 4 hours PRN Dyspepsia  benzocaine/menthol Lozenge 1 Lozenge Oral two times a day PRN Sore Throat  benzonatate 100 milliGRAM(s) Oral three times a day PRN Cough  dextrose Oral Gel 15 Gram(s) Oral once PRN Blood Glucose LESS THAN 70 milliGRAM(s)/deciliter  loperamide 2 milliGRAM(s) Oral three times a day PRN Diarrhea  polyethylene glycol 3350 17 Gram(s) Oral daily PRN Constipation  SIMETHICONE SOFTGELS 250 milliGRAM(s) 250 milliGRAM(s) Oral three times a day PRN for gas  sodium chloride 0.65% Nasal 1 Spray(s) Both Nostrils every 8 hours PRN Nasal Congestion

## 2023-11-13 NOTE — CHART NOTE - NSCHARTNOTEFT_GEN_A_CORE
I called radiologist Dante Elizabeth   [phone] for clarification of CT chest read.  Left message for a call back

## 2023-11-13 NOTE — PROGRESS NOTE ADULT - ASSESSMENT
Assessment/Plan:  ALIZA FOLEY is a 62 year old female with PMH of pAFIB and sciatica; who presented to [facility] ED with SOB, and was found to have groundglass opacities and interstitial lung disease. She was treated with empiric Vancomycin and Zosyn. She underwent a bronchoscopy with bronchoalveolar lavage and pulse steroids. She was given IV diuretics for increased pulmonary congestion, but her respiratory status continued to worsen.  On 9/13, she was transferred to [facility] for ECMO requirements. She was further treated with Plasmapheresis, Rituximab and high-dose steroids, with significant improvement. Her overall hospital course was complicated by thrombocytopenia (s/p several platelet transfusions), leukocytosis (infectious workup entirely negative- s/p Vanco and Ceftriaxone), AFIB with RVR (resolved with Metoprolol), dysphagia (requiring NGT), transaminitis (secondary to acute illness and hypotension),  non-occlusive RIJ DVT (started on Lovenox). Patient now admitted for a multidisciplinary rehab program. 10-05-23 @ 13:18    * Sustained overall medical (pulmonary, cardiac) and functional improvement, focussing more on LE function   * CT chest today as recs by pulmonary, serial montoring   * Mainly requiring oxy nightly, -- Continue tapering oxygen,    #Interstitial Lung Disease and Mixed connective tissue disease  - Gait Instability, ADL impairments and Functional impairments: start Comprehensive Rehab Program of PT/OT/SLP - 3 hours a day, 5 days a week  - P&O as needed   - Non-specific Interstitial Lung Disease  - Requiring ECMO   - Improvement s/p Plasmapheresis, Rituximab and high- dose steroids   - Albuterol/Ipratropium Nebulizer every 6 hours  - Mepron 1500mg daily  - PO Medrol ( due to liver dysfunction): Plan will be to taper by ~20% every 2 weeks    Medrol   64mg x 2 weeks   56mg x 2 weeks  44mg x 2 weeks   36mg x 2 weeks - Follow up Outpatient   - Plan to wean 02 as tolerated, Continue tapering oxygen,  - Repeat CT Chest tody 11/10    * Posttnasal drip--ENT review 11/7, declined scope, continue flonase     #pAFIB/Rt Ij thrombus  - Metoprolol 25mg BID and lovenox  --- Eliquis 5mg BID - tolerating well     # Chronic Tinnitus   - ENT review and recs appreciate, Patient already made ENT appointment for f/u    # Lymphedema both legs -chronic and Rt IJ thrombus  - ACE Wrap BL LEs  - BL LE doppler negative for DVT  - BL UE doppler with chronic thrombotic changes in RIJ     #Transaminitis --LFT Down trending   - Secondary to acute illness and hypotension at [facility]  - Outpatient follow up     #Neuropathy  - Gabapentin 300 mg BID, will consider increasing dose, increase to TID 11/10  --Work on motor strengthening, priority for UE as preferred by patient   - Vit b12 >2,000 in 9/2023    #Sleep/Mood  - Melatonin 6mg at HS  - Psych rec Xanax 0.25mg PRN    #Skin  - Skin: Left lower abdomen ruptured blister 3.0 x 1.0, stage 2 pressure injury to right buttock 4 x1 and left buttock 3x1, stage 2 pressure injury ti right inner thigh 0.2 x 0.2, right groin wound 10.5 x 2.0, Left groin wound 5 x 5,  right neck scab wound  -- MAD to skin folds- Nystatin to submammary and abdominal pannus BID  -- MAD to L groin- cleanse with NS, Triad cream daily  -- Mad to R groin- Nystatin powder daily   -- R groin wound-- aquacel packing, Triad to periwound, Allevyn foam- daily and PRN   - Pressure injury/Skin: OOB to Chair, PT/OT   - Offload pressure, low air loss support surfaces, T&P every 2 hours   --Wound care consult-10/9 -Multiple wounds--perineal and buttock/sacral, recs appreciated   --Suggest Continue treatments as below:   Twice daily & PRN Normal Saline Cleanse of abdominal pannus & breast folds, perineal, Left & Right inner buttock erosions.  Pat thoroughly dry.   Apply Desitin generously twice daily (& PRN) to perineal, buttocks, and left groin erosions, leave open to air.    Apply Nystatin powder to abdominal pannus and breast folds.  Suggest use of Interdry cloths in folds to 'wick' moisture.  Suggest daily Normal Saline Cleanse of right groin surgical wound, pat dry.  Apply Cavillon film barrier to intact periwound skin.  Place Aquacell strip over open area, cover with foam dressing.  - Wound care following     #Pain Mgmt   - Tylenol PRN   - Gabapentin 300mg at HS     #GI/Bowel Mgmt   - Pantoprazole  - Senna  --on hold due to recent Loose BM  - PRN: Maalox   - Stool count  --Lactobacillus BID   -- AB XR mod stool burden on transverse colon 10/23--still has mod stool burden, but moving bowel appropriately  - S/p enema and lactulose    # Alternating bowel function --continue probiotic, lactobacillus   * Leucocytosis probably steroid related and partly due to her Mixed Connective Tissue Disease, will continue monitoring     #/Bladder Mgmt   -Spontaneously voiding   - UA (11/3) negative    #FEN   #Dysphagia  - Diet - Minced & Moist  [CC]    - Dysphaiga- SLP evaluation     #Health maintenance  - Folic Acid   - MVI  - Ocean nasal spray    # Difficulty with access to peripheral veins-- Blood draws from Left arm Medline     #Precautions / PROPHYLAXIS:   - Falls  - ortho: Weight bearing status: WBAT   - Lungs: Aspiration, Incentive Spirometer   - DVT PPX: Eliquis 5 mg BID     11/9--Labd CBC mild anemia, normal renal function    ---------------------------  IDT conference on 11/7  Social Work: Await peer to peer with CM. Provided private hire list. Lives with spouse in [place] 6 months of year. DC to [facility] in [place] with elevator, no steps. Supportive spouse.   Function--Min assistance upper body dressing and upper body grooming  Total assistance with transfers, sitting balance significantly improved, now independent  Able to eat with both hands and comb hair unassisted  Ambulation--Max Assist with light gate x 4 persons  Barrier--Requirement for max assistance most activities, still still 02 dependent  DME--Wheel chair  Est dc home vs Subacute Rehab 11/14 (revised dc date in view of sustained improvement, especially with upper extremity function)    MEETING WITH PATIENT AND PARTNER ON THERAPY GOALS--11/9  Both of them clearly stated their long and short term therapy goals  Short term--to sustain current improvement with Upper ext function and continue engagement for improvement on LE function--strengthening and wt bearing   Long term goal--to ambulate with device and subsequently without device. She clearly stated that this would take considerable time and not likely to happen prior to dc form acute rehab.  She report that although she had often stated that she wants to walk out from the acute rehab unit, this assertion remains a goal, but does not mean an immediate goal  She understands the concern by her therapists that she may have unrealistic goals, but both patient and partner clearly stated they are aware of current deficits, marked improvements already made and they will continue to work with  and insurance company with regards to discharge planning   We discussed other details with regards to home improvement in preparation for d/c home, Patient and partner stated that they are making plans in this direction as well    OUTPATIENT/FOLLOW UP:    Trae Whitney  Pulmonary Disease  [address], [address] [place] [address]  Phone: [phone]  Fax: [phone]  Follow Up Time: 2 weeks    Ryanne Allen  Rheumatology  [address] [address]  Phone: [phone]  Fax: [phone]  Follow Up Time: 1 week    Kyle Pierce  Internal Medicine  [address] [place] [address], [address]  [place] [address]  Phone: [phone]  Fax: [phone]  Follow Up Time: 2 weeks    Addy Powers  Pulmonary Disease  [address] [facility], [facility] [address]  [place], [place] [id number]  Phone: [phone]  Fax: [phone]  Follow Up Time:     Kwame Hawley  Critical Care Medicine  410 [facility], [address] [address]  Phone: [phone]  Fax: [phone]  Follow Up Time:     Neena Borrego  Rheumatology  [address] [place] [address], [address]  [place] [address]  Phone: [phone]  Fax: [phone]  Follow Up Time:    [facility]  [facility] [address]  Scheduled Appointment: 09/13/2023  2:40 PM  ---------------------------      Assessment/Plan:  ALIZA FOLEY is a 62 year old female with PMH of pAFIB and sciatica; who presented to [facility] ED with SOB, and was found to have groundglass opacities and interstitial lung disease. She was treated with empiric Vancomycin and Zosyn. She underwent a bronchoscopy with bronchoalveolar lavage and pulse steroids. She was given IV diuretics for increased pulmonary congestion, but her respiratory status continued to worsen.  On 9/13, she was transferred to [facility] for ECMO requirements. She was further treated with Plasmapheresis, Rituximab and high-dose steroids, with significant improvement. Her overall hospital course was complicated by thrombocytopenia (s/p several platelet transfusions), leukocytosis (infectious workup entirely negative- s/p Vanco and Ceftriaxone), AFIB with RVR (resolved with Metoprolol), dysphagia (requiring NGT), transaminitis (secondary to acute illness and hypotension),  non-occlusive RIJ DVT (started on Lovenox). Patient now admitted for a multidisciplinary rehab program. 10-05-23 @ 13:18    * Sustained functional improvement--off oxy during therapy session and at bedside, requiring it nightly,  * CT chest serial monitoring of ILD, await pulmonary recs  * Labs unremarkable, LFT elevated but non progressive     #Interstitial Lung Disease and Mixed connective tissue disease  - Gait Instability, ADL impairments and Functional impairments: start Comprehensive Rehab Program of PT/OT/SLP - 3 hours a day, 5 days a week  - P&O as needed   - Non-specific Interstitial Lung Disease  - Requiring ECMO   - Improvement s/p Plasmapheresis, Rituximab and high- dose steroids   - Albuterol/Ipratropium Nebulizer every 6 hours  - Mepron 1500mg daily  - PO Medrol ( due to liver dysfunction): Plan will be to taper by ~20% every 2 weeks    Medrol   64mg x 2 weeks   56mg x 2 weeks  44mg x 2 weeks   36mg x 2 weeks - Follow up Outpatient   - Plan to wean 02 as tolerated, Continue tapering oxygen,  -  CT Chest noted 11/10---await Pulmonary    * Posttnasal drip--ENT review 11/7, declined scope, continue flonase     #pAFIB/Rt Ij thrombus  - Metoprolol 25mg BID and lovenox  --- Eliquis 5mg BID - tolerating well     # Chronic Tinnitus   - ENT review and recs appreciate, Patient already made ENT appointment for f/u    # Lymphedema both legs -chronic and Rt IJ thrombus  - ACE Wrap BL LEs  - BL LE doppler negative for DVT  - BL UE doppler with chronic thrombotic changes in RIJ     #Transaminitis --LFT Down trending   - Secondary to acute illness and hypotension at [facility]  - Outpatient follow up     #Neuropathy  - Gabapentin 300 mg BID, will consider increasing dose, increase to TID 11/10  --Work on motor strengthening, priority for UE as preferred by patient   - Vit b12 >2,000 in 9/2023  --Will consider Rhuematology f/u if needed    #Sleep/Mood  - Melatonin 6mg at HS  - Psych rec Xanax 0.25mg PRN    #Skin  - Skin: Left lower abdomen ruptured blister 3.0 x 1.0, stage 2 pressure injury to right buttock 4 x1 and left buttock 3x1, stage 2 pressure injury ti right inner thigh 0.2 x 0.2, right groin wound 10.5 x 2.0, Left groin wound 5 x 5,  right neck scab wound  -- MAD to skin folds- Nystatin to submammary and abdominal pannus BID  -- MAD to L groin- cleanse with NS, Triad cream daily  -- Mad to R groin- Nystatin powder daily   -- R groin wound-- aquacel packing, Triad to periwound, Allevyn foam- daily and PRN   - Pressure injury/Skin: OOB to Chair, PT/OT   - Offload pressure, low air loss support surfaces, T&P every 2 hours   --Wound care consult-10/9 -Multiple wounds--perineal and buttock/sacral, recs appreciated   --Suggest Continue treatments as below:   Twice daily & PRN Normal Saline Cleanse of abdominal pannus & breast folds, perineal, Left & Right inner buttock erosions.  Pat thoroughly dry.   Apply Desitin generously twice daily (& PRN) to perineal, buttocks, and left groin erosions, leave open to air.    Apply Nystatin powder to abdominal pannus and breast folds.  Suggest use of Interdry cloths in folds to 'wick' moisture.  Suggest daily Normal Saline Cleanse of right groin surgical wound, pat dry.  Apply Cavillon film barrier to intact periwound skin.  Place Aquacell strip over open area, cover with foam dressing.  - Wound care following     #Pain Mgmt   - Tylenol PRN   - Gabapentin 300mg at HS     #GI/Bowel Mgmt   - Pantoprazole  - Senna  --on hold due to recent Loose BM  - PRN: Maalox   - Stool count  --Lactobacillus BID   -- AB XR mod stool burden on transverse colon 10/23--still has mod stool burden, but moving bowel appropriately  - S/p enema and lactulose    # Alternating bowel function --continue probiotic, lactobacillus   * Leucocytosis probably steroid related and partly due to her Mixed Connective Tissue Disease, will continue monitoring     #/Bladder Mgmt   -Spontaneously voiding   - UA (11/3) negative    #FEN   #Dysphagia  - Diet - Minced & Moist  [CC]    - Dysphaiga- SLP evaluation     #Health maintenance  - Folic Acid   - MVI  - Ocean nasal spray    # Difficulty with access to peripheral veins-- Blood draws from Left arm Medline     #Precautions / PROPHYLAXIS:   - Falls  - ortho: Weight bearing status: WBAT   - Lungs: Aspiration, Incentive Spirometer   - DVT PPX: Eliquis 5 mg BID     11/13 --Labs CBC mild anemia, normal renal function, LFT elevated, non progressive     ---------------------------  IDT conference on 11/7  Social Work: Await peer to peer with CM. Provided private hire list. Lives with spouse in [place] 6 months of year. DC to apt in [place] with elevator, no steps. Supportive spouse.   Function--Min assistance upper body dressing and upper body grooming  Total assistance with transfers, sitting balance significantly improved, now independent  Able to eat with both hands and comb hair unassisted  Ambulation--Max Assist with light gate x 4 persons  Barrier--Requirement for max assistance most activities, still still 02 dependent  DME--Wheel chair  Est dc home vs Subacute Rehab 11/14 (revised dc date in view of sustained improvement, especially with upper extremity function)      Further discussion on rehab goals-among Rehab team 11/13   Discussed with therapy supervisors and team SW  I gave detailed update on my discussion with patient and her partner with regards to their therapy goals and expectations during the acute rehab treatment  Both affirm that patient is making noticeable improvement with lower extremity function--ankle strength and knee extension  They are excited with continue UE improvement markedly improved strength at wrist and elbows), and now working on strengthening the shoulder muscles  The patient remains committed and engaging well in therapy. She is aware of her current deficits. She is also aware of the fact that she continues to need intense therapy to sustained current functional gains and facilitate subsequent improve,emts  Her partner has remained supportive and admits that sometime her attempt to encourage patient to engage, may be putting patient under pressure at some times  Both are clear with regards to therapy goals, to improve on current gains and achieve ambulatory capacity with device  Team members were happy with the update    OUTPATIENT/FOLLOW UP:    Trae Whitney  Pulmonary Disease  [address] [place] [address]  Phone: [phone]  Fax: [phone]  Follow Up Time: 2 weeks    Ryanne Allen  Rheumatology  [address] [address]  Phone: [phone]  Fax: [phone]  Follow Up Time: 1 week    Kyle Pierce  Internal Medicine  [address] [place] [address], [address]  [place] [address]  Phone: [phone]  Fax: [phone]  Follow Up Time: 2 weeks    Addy Powers  Pulmonary Disease  410 [facility], [address]  [place], [place] [address]  Phone: [phone]  Fax: [phone]  Follow Up Time:     Kwame Hawley  Critical Care Medicine  410 [facility], [facility] [address]  [place] [address]  Phone: [phone]  Fax: [phone]  Follow Up Time:     Neena Borrego  Rheumatology  [address] [place] [address], [address]  [place] [address]  Phone: [phone]  Fax: [phone]  Follow Up Time:    [facility]  [address]  Scheduled Appointment: 09/13/2023  2:40 PM  ---------------------------    Non critical care  Time spent 62 mins patient review, discussion with family on update, observation in therapy, care co ordination

## 2023-11-13 NOTE — CHART NOTE - NSCHARTNOTEFT_GEN_A_CORE
Patient seen at bedside for brief (13 minute) supportive follow-up session. She indicates continued gains in her rehabilitation sessions, particularly with her hands.      Mood/affect normal. She indicates gastrointestinal discomfort today (medical team is aware). Patient informed by social work that her insurance does not have EMIGDIO benefit and next steps to be further determined.     Plan: Neuropsychology will continue to monitor adjustment and mood. Neuropsychology remains available through discharge.

## 2023-11-13 NOTE — PROGRESS NOTE ADULT - ASSESSMENT
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   -c/w steroids - taper as scheduled (2 week intervals)   -c/w nebs  -c/w PT/OT  -Mepron for PCP ppx while on steroids     #PAF  #Non-occlusive right IJ thrombus   -c/w Eliquis  -c/w lopressor    #Anxiety  -appreciate psych follow-up  -c/w Xanax PRN    #Transaminitis  -improving.    #DVT ppx: Eliquis 62F with AF, hx sciatica, newly diagnosed ILD (likely associated with MCTD +ARIANA, +RNP, +Sm) with exacerbation requiring ECMO / plasmapharesis / Rituximab / steriods, now at acute rehab    #leukocytosis  - wbc increased to 19, remains afebrile, continue to monitor, likely due to steroids.  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   -c/w steroids - taper as scheduled (2 week intervals)   -c/w nebs  -c/w PT/OT  -Mepron for PCP ppx while on steroids     #PAF  #Non-occlusive right IJ thrombus   -c/w Eliquis  -c/w lopressor    #Anxiety  -appreciate psych follow-up  -c/w Xanax PRN    #Transaminitis  -improving.    #DVT ppx: Eliquis 62F with AF, hx sciatica, newly diagnosed ILD (likely associated with MCTD +ARIANA, +RNP, +Sm) with exacerbation requiring ECMO / plasmapharesis / Rituximab / steriods, now at acute rehab    #leukocytosis  - wbc increased to 19, remains afebrile, continue to monitor, likely due to steroids.  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   -c/w steroids - taper as scheduled (2 week intervals)   -c/w nebs  -c/w PT/OT  -Mepron for PCP ppx while on steroids     #PAF  #Non-occlusive right IJ thrombus   -c/w Eliquis  -c/w lopressor    #Anxiety  -appreciate psych follow-up  -c/w Xanax PRN    #Transaminitis  -improving.  Addendum 11/14: LFTs resulted later in the morning, they are slightly worse, will trend    #DVT ppx: Eliquis

## 2023-11-13 NOTE — CHART NOTE - NSCHARTNOTEFT_GEN_A_CORE
Nutrition Follow Up Note  Source: Medical Record [X] Patient [X] Family [ ]         Diet: Consistent carbohydrate (no snacks), Glucerna daily  Pt tolerating diet with good PO intake, eating >75% of meals per nursing flow sheets. Discussed PO intake and current diet. Pt noted with low potassium level yesterday. Discussed food sources of potassium. Denies nausea, vomiting, diarrhea, constipation; however pt reports frequent formed bowel movements    Enteral/Parenteral Nutrition: N/A    Current Weight: 256.6 lbs (10-5)    Pertinent Medications: MEDICATIONS  (STANDING):  ammonium lactate 12% Lotion 1 Application(s) Topical every 12 hours  apixaban 5 milliGRAM(s) Oral every 12 hours  artificial  tears Solution 1 Drop(s) Both EYES every 12 hours  ascorbic acid 500 milliGRAM(s) Oral daily  atovaquone  Suspension 1500 milliGRAM(s) Oral daily  bisacodyl 5 milliGRAM(s) Oral <User Schedule>  dextrose 5%. 1000 milliLiter(s) (100 mL/Hr) IV Continuous <Continuous>  dextrose 5%. 1000 milliLiter(s) (50 mL/Hr) IV Continuous <Continuous>  dextrose 50% Injectable 12.5 Gram(s) IV Push once  dextrose 50% Injectable 25 Gram(s) IV Push once  dextrose 50% Injectable 25 Gram(s) IV Push once  folic acid 1 milliGRAM(s) Oral daily  gabapentin 300 milliGRAM(s) Oral three times a day  glucagon  Injectable 1 milliGRAM(s) IntraMuscular once  hemorrhoidal Ointment 1 Application(s) Rectal four times a day  hydrocortisone hemorrhoidal Suppository 1 Suppository(s) Rectal two times a day  lactobacillus acidophilus 1 Tablet(s) Oral two times a day with meals  melatonin 6 milliGRAM(s) Oral at bedtime  methylPREDNISolone 44 milliGRAM(s) Oral daily  methylPREDNISolone   Oral   metoprolol tartrate 25 milliGRAM(s) Oral two times a day  multivitamin 1 Tablet(s) Oral daily  nystatin Powder 1 Application(s) Topical two times a day  pantoprazole    Tablet 40 milliGRAM(s) Oral before breakfast  psyllium Powder 1 Packet(s) Oral daily  sodium chloride 0.9% lock flush 5 milliLiter(s) IV Push two times a day  zinc oxide 40% Paste 1 Application(s) Topical three times a day    MEDICATIONS  (PRN):  albuterol/ipratropium for Nebulization 3 milliLiter(s) Nebulizer every 6 hours PRN Shortness of Breath and/or Wheezing  ALPRAZolam 0.25 milliGRAM(s) Oral every 6 hours PRN Anxiety  aluminum hydroxide/magnesium hydroxide/simethicone Suspension 30 milliLiter(s) Oral every 4 hours PRN Dyspepsia  benzocaine/menthol Lozenge 1 Lozenge Oral two times a day PRN Sore Throat  benzonatate 100 milliGRAM(s) Oral three times a day PRN Cough  dextrose Oral Gel 15 Gram(s) Oral once PRN Blood Glucose LESS THAN 70 milliGRAM(s)/deciliter  loperamide 2 milliGRAM(s) Oral three times a day PRN Diarrhea  polyethylene glycol 3350 17 Gram(s) Oral daily PRN Constipation  SIMETHICONE SOFTGELS 250 milliGRAM(s) 250 milliGRAM(s) Oral three times a day PRN for gas  sodium chloride 0.65% Nasal 1 Spray(s) Both Nostrils every 8 hours PRN Nasal Congestion      Pertinent Labs:  11-13 Na134 mmol/L<L> Glu 167 mg/dL<H> K+ 4.1 mmol/L Cr  0.64 mg/dL BUN 20 mg/dL 11-13 Alb 2.8 g/dL<L>        Skin: surgical incision per nursing flow sheets     Edema: No edema per nursing flow sheets     Last BM: on 11-12 Per nursing flowsheets     Estimated Needs:   [X] No Change since Previous Assessment  [ ] Recalculated:     Previous Nutrition Diagnosis:   1. Severe malnutrition - improved  2. Increased nutrient needs    Nutrition Diagnosis is [X] Ongoing  - addressed with MVI, vitamin C, glucerna (provides 220 kcal, 10 g protein/serving)     New Nutrition Diagnosis: [X] Not Applicable  [ ] Inadequate Protein Energy Intake   [ ] Inadequate Oral Intake   [ ] Excessive Energy Intake   [ ] Increased Nutrient Needs   [ ] Obesity   [ ] Altered GI Function   [ ] Unintended Weight Loss   [ ] Food & Nutrition Related Knowledge Deficit  [ ] Limited Adherence to nutrition related recommendations   [ ] Malnutrition      Interventions:   1. Recommend continuing with current plan of care  2. Encourage PO intake  3. Ongoing diet education    Monitoring & Evaluation:   [X] Weights   [X] PO Intake   [X] Follow Up (Per Protocol)  [X] Tolerance to Diet Prescription   [X] Other: Labs     RD Remains Available.  Desire Livingston RD Nutrition Follow Up Note  Source: Medical Record [X] Patient [X] Family [ ]         Diet: Consistent carbohydrate (no snacks), Glucerna daily  Pt tolerating diet with good PO intake, eating >75% of meals per nursing flow sheets. Discussed PO intake and current diet. Pt noted with low potassium level yesterday. Discussed food sources of potassium. Denies nausea, vomiting, diarrhea, constipation; however pt reports frequent formed bowel movements    Enteral/Parenteral Nutrition: N/A    Current Weight: 256.6 lbs (10-5)    Pertinent Medications: MEDICATIONS  (STANDING):  ammonium lactate 12% Lotion 1 Application(s) Topical every 12 hours  apixaban 5 milliGRAM(s) Oral every 12 hours  artificial  tears Solution 1 Drop(s) Both EYES every 12 hours  ascorbic acid 500 milliGRAM(s) Oral daily  atovaquone  Suspension 1500 milliGRAM(s) Oral daily  bisacodyl 5 milliGRAM(s) Oral <User Schedule>  dextrose 5%. 1000 milliLiter(s) (100 mL/Hr) IV Continuous <Continuous>  dextrose 5%. 1000 milliLiter(s) (50 mL/Hr) IV Continuous <Continuous>  dextrose 50% Injectable 12.5 Gram(s) IV Push once  dextrose 50% Injectable 25 Gram(s) IV Push once  dextrose 50% Injectable 25 Gram(s) IV Push once  folic acid 1 milliGRAM(s) Oral daily  gabapentin 300 milliGRAM(s) Oral three times a day  glucagon  Injectable 1 milliGRAM(s) IntraMuscular once  hemorrhoidal Ointment 1 Application(s) Rectal four times a day  hydrocortisone hemorrhoidal Suppository 1 Suppository(s) Rectal two times a day  lactobacillus acidophilus 1 Tablet(s) Oral two times a day with meals  melatonin 6 milliGRAM(s) Oral at bedtime  methylPREDNISolone 44 milliGRAM(s) Oral daily  methylPREDNISolone   Oral   metoprolol tartrate 25 milliGRAM(s) Oral two times a day  multivitamin 1 Tablet(s) Oral daily  nystatin Powder 1 Application(s) Topical two times a day  pantoprazole    Tablet 40 milliGRAM(s) Oral before breakfast  psyllium Powder 1 Packet(s) Oral daily  sodium chloride 0.9% lock flush 5 milliLiter(s) IV Push two times a day  zinc oxide 40% Paste 1 Application(s) Topical three times a day    MEDICATIONS  (PRN):  albuterol/ipratropium for Nebulization 3 milliLiter(s) Nebulizer every 6 hours PRN Shortness of Breath and/or Wheezing  ALPRAZolam 0.25 milliGRAM(s) Oral every 6 hours PRN Anxiety  aluminum hydroxide/magnesium hydroxide/simethicone Suspension 30 milliLiter(s) Oral every 4 hours PRN Dyspepsia  benzocaine/menthol Lozenge 1 Lozenge Oral two times a day PRN Sore Throat  benzonatate 100 milliGRAM(s) Oral three times a day PRN Cough  dextrose Oral Gel 15 Gram(s) Oral once PRN Blood Glucose LESS THAN 70 milliGRAM(s)/deciliter  loperamide 2 milliGRAM(s) Oral three times a day PRN Diarrhea  polyethylene glycol 3350 17 Gram(s) Oral daily PRN Constipation  SIMETHICONE SOFTGELS 250 milliGRAM(s) 250 milliGRAM(s) Oral three times a day PRN for gas  sodium chloride 0.65% Nasal 1 Spray(s) Both Nostrils every 8 hours PRN Nasal Congestion      Pertinent Labs:  11-13 Na134 mmol/L<L> Glu 167 mg/dL<H> K+ 4.1 mmol/L Cr  0.64 mg/dL BUN 20 mg/dL 11-13 Alb 2.8 g/dL<L>        Skin: R inner thigh stage II, R inner buttock stage II, L inner buttock stage II Per nursing flowsheets    Edema: No edema per nursing flow sheets     Last BM: on 11-12 Per nursing flowsheets     Estimated Needs:   [X] No Change since Previous Assessment  [ ] Recalculated:     Previous Nutrition Diagnosis:   1. Severe malnutrition - improved  2. Increased nutrient needs    Nutrition Diagnosis is [X] Ongoing  - addressed with MVI, vitamin C, glucerna (provides 220 kcal, 10 g protein/serving)     New Nutrition Diagnosis: [X] Not Applicable  [ ] Inadequate Protein Energy Intake   [ ] Inadequate Oral Intake   [ ] Excessive Energy Intake   [ ] Increased Nutrient Needs   [ ] Obesity   [ ] Altered GI Function   [ ] Unintended Weight Loss   [ ] Food & Nutrition Related Knowledge Deficit  [ ] Limited Adherence to nutrition related recommendations   [ ] Malnutrition      Interventions:   1. Recommend continuing with current plan of care  2. Encourage PO intake  3. Ongoing diet education    Monitoring & Evaluation:   [X] Weights   [X] PO Intake   [X] Follow Up (Per Protocol)  [X] Tolerance to Diet Prescription   [X] Other: Labs     RD Remains Available.  Desire Livingston RD

## 2023-11-13 NOTE — PROGRESS NOTE ADULT - SUBJECTIVE AND OBJECTIVE BOX
no acute events overnight.  remains on 0.5 - 1 L      PHYSICAL EXAM:    Vital Signs Last 24 Hrs  T(F): 98.3 (13 Nov 2023 09:44), Max: 98.3 (13 Nov 2023 09:44)  HR: 76 (13 Nov 2023 09:44) (76 - 77)  BP: 111/64 (12 Nov 2023 21:21) (111/64 - 111/64)  RR: 16 (13 Nov 2023 09:44) (16 - 16)  SpO2: 95% (13 Nov 2023 09:44) (95% - 96%)  I&O's Summary      GENERAL: NAD  HEENT: NCAT  CHEST/LUNG: No increased WOB, Clear to percussion bilaterally; No rales, rhonchi, wheezing  HEART: Regular rate and rhythm; No murmurs  ABDOMEN: Soft, Nontender, Nondistended; Bowel sounds present  MUSCULOSKELETAL/EXTREMITIES:  2+ Peripheral Pulses, 1+LE edema  PSYCH: Appropriate affect, Alert & Oriented    LABS:                        11.2   19.75 )-----------( 436      ( 13 Nov 2023 13:05 )             36.4       11-13    134  |  98  |  20  ----------------------------<  167  4.1   |  28  |  0.64    Ca    9.2      13 Nov 2023 13:05    TPro  5.8  /  Alb  2.8  /  TBili  1.2  /  DBili  x   /  AST  66  /  ALT  127  /  AlkPhos  344  11-13                09-25 Chol -- LDL -- HDL -- Trig 199 mg/dL                  Urinalysis Basic - ( 13 Nov 2023 13:05 )    Color: x / Appearance: x / SG: x / pH: x  Gluc: 167 mg/dL / Ketone: x  / Bili: x / Urobili: x   Blood: x / Protein: x / Nitrite: x   Leuk Esterase: x / RBC: x / WBC x   Sq Epi: x / Non Sq Epi: x / Bacteria: x            MEDICATIONS  (STANDING):  ammonium lactate 12% Lotion 1 Application(s) Topical every 12 hours  apixaban 5 milliGRAM(s) Oral every 12 hours  artificial  tears Solution 1 Drop(s) Both EYES every 12 hours  ascorbic acid 500 milliGRAM(s) Oral daily  atovaquone  Suspension 1500 milliGRAM(s) Oral daily  bisacodyl 5 milliGRAM(s) Oral <User Schedule>  dextrose 5%. 1000 milliLiter(s) (100 mL/Hr) IV Continuous <Continuous>  dextrose 5%. 1000 milliLiter(s) (50 mL/Hr) IV Continuous <Continuous>  dextrose 50% Injectable 12.5 Gram(s) IV Push once  dextrose 50% Injectable 25 Gram(s) IV Push once  dextrose 50% Injectable 25 Gram(s) IV Push once  folic acid 1 milliGRAM(s) Oral daily  gabapentin 300 milliGRAM(s) Oral three times a day  glucagon  Injectable 1 milliGRAM(s) IntraMuscular once  hemorrhoidal Ointment 1 Application(s) Rectal four times a day  hydrocortisone hemorrhoidal Suppository 1 Suppository(s) Rectal two times a day  lactobacillus acidophilus 1 Tablet(s) Oral two times a day with meals  melatonin 6 milliGRAM(s) Oral at bedtime  methylPREDNISolone 44 milliGRAM(s) Oral daily  methylPREDNISolone   Oral   metoprolol tartrate 25 milliGRAM(s) Oral two times a day  multivitamin 1 Tablet(s) Oral daily  nystatin Powder 1 Application(s) Topical two times a day  pantoprazole    Tablet 40 milliGRAM(s) Oral before breakfast  psyllium Powder 1 Packet(s) Oral daily  sodium chloride 0.9% lock flush 5 milliLiter(s) IV Push two times a day  zinc oxide 40% Paste 1 Application(s) Topical three times a day    MEDICATIONS  (PRN):  albuterol/ipratropium for Nebulization 3 milliLiter(s) Nebulizer every 6 hours PRN Shortness of Breath and/or Wheezing  ALPRAZolam 0.25 milliGRAM(s) Oral every 6 hours PRN Anxiety  aluminum hydroxide/magnesium hydroxide/simethicone Suspension 30 milliLiter(s) Oral every 4 hours PRN Dyspepsia  benzocaine/menthol Lozenge 1 Lozenge Oral two times a day PRN Sore Throat  benzonatate 100 milliGRAM(s) Oral three times a day PRN Cough  dextrose Oral Gel 15 Gram(s) Oral once PRN Blood Glucose LESS THAN 70 milliGRAM(s)/deciliter  loperamide 2 milliGRAM(s) Oral three times a day PRN Diarrhea  polyethylene glycol 3350 17 Gram(s) Oral daily PRN Constipation  SIMETHICONE SOFTGELS 250 milliGRAM(s) 250 milliGRAM(s) Oral three times a day PRN for gas  sodium chloride 0.65% Nasal 1 Spray(s) Both Nostrils every 8 hours PRN Nasal Congestion      Care Discussed with Consultants/Other Providers: Yes

## 2023-11-14 PROCEDURE — 99233 SBSQ HOSP IP/OBS HIGH 50: CPT

## 2023-11-14 PROCEDURE — 99232 SBSQ HOSP IP/OBS MODERATE 35: CPT

## 2023-11-14 RX ADMIN — SODIUM CHLORIDE 5 MILLILITER(S): 9 INJECTION INTRAMUSCULAR; INTRAVENOUS; SUBCUTANEOUS at 08:24

## 2023-11-14 RX ADMIN — ATOVAQUONE 1500 MILLIGRAM(S): 750 SUSPENSION ORAL at 14:50

## 2023-11-14 RX ADMIN — SODIUM CHLORIDE 5 MILLILITER(S): 9 INJECTION INTRAMUSCULAR; INTRAVENOUS; SUBCUTANEOUS at 17:27

## 2023-11-14 RX ADMIN — APIXABAN 5 MILLIGRAM(S): 2.5 TABLET, FILM COATED ORAL at 20:06

## 2023-11-14 RX ADMIN — GABAPENTIN 300 MILLIGRAM(S): 400 CAPSULE ORAL at 20:05

## 2023-11-14 RX ADMIN — APIXABAN 5 MILLIGRAM(S): 2.5 TABLET, FILM COATED ORAL at 08:36

## 2023-11-14 RX ADMIN — PANTOPRAZOLE SODIUM 40 MILLIGRAM(S): 20 TABLET, DELAYED RELEASE ORAL at 08:38

## 2023-11-14 RX ADMIN — Medication 1 MILLIGRAM(S): at 14:50

## 2023-11-14 RX ADMIN — Medication 500 MILLIGRAM(S): at 14:50

## 2023-11-14 RX ADMIN — Medication 1 TABLET(S): at 08:38

## 2023-11-14 RX ADMIN — Medication 1 TABLET(S): at 17:28

## 2023-11-14 RX ADMIN — Medication 6 MILLIGRAM(S): at 20:05

## 2023-11-14 RX ADMIN — ZINC OXIDE 1 APPLICATION(S): 200 OINTMENT TOPICAL at 14:50

## 2023-11-14 RX ADMIN — GABAPENTIN 300 MILLIGRAM(S): 400 CAPSULE ORAL at 08:35

## 2023-11-14 RX ADMIN — Medication 1 TABLET(S): at 14:51

## 2023-11-14 RX ADMIN — NYSTATIN CREAM 1 APPLICATION(S): 100000 CREAM TOPICAL at 08:24

## 2023-11-14 RX ADMIN — ZINC OXIDE 1 APPLICATION(S): 200 OINTMENT TOPICAL at 08:24

## 2023-11-14 RX ADMIN — Medication 44 MILLIGRAM(S): at 08:37

## 2023-11-14 RX ADMIN — GABAPENTIN 300 MILLIGRAM(S): 400 CAPSULE ORAL at 14:49

## 2023-11-14 RX ADMIN — NYSTATIN CREAM 1 APPLICATION(S): 100000 CREAM TOPICAL at 17:27

## 2023-11-14 RX ADMIN — ZINC OXIDE 1 APPLICATION(S): 200 OINTMENT TOPICAL at 20:08

## 2023-11-14 RX ADMIN — Medication 25 MILLIGRAM(S): at 20:06

## 2023-11-14 NOTE — PROGRESS NOTE ADULT - ASSESSMENT
62F with AF, hx sciatica, newly diagnosed ILD (likely associated with MCTD +ARIANA, +RNP, +Sm) with exacerbation requiring ECMO / plasmapharesis / Rituximab / steriods, now at acute rehab    #Transaminitis  -slightly worsened today, continue to monitor. T bili WNL and less suggestive of biliary obstruction    #hyponatremia  - 134 today, continue to follow. If continues to downtrend, will obtain serum osm, urine sodium    #leukocytosis  - wbc increased to 19, remains afebrile, continue to monitor, likely due to steroids.  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   -c/w steroids - taper as scheduled (2 week intervals)   -c/w nebs  -c/w PT/OT  -Mepron for PCP ppx while on steroids     #PAF  #Non-occlusive right IJ thrombus   -c/w Eliquis  -c/w lopressor -- held today due to systolic BP 98    #Anxiety  -appreciate psych follow-up  -c/w Xanax PRN      #DVT ppx: Eliquis

## 2023-11-14 NOTE — PROGRESS NOTE ADULT - SUBJECTIVE AND OBJECTIVE BOX
SUBJECTIVE/ROS:  Patient seen and examined, at bed side and observed in therapy  Reports altered bowel function x 1 day, with minimal volume stool  She requested GI F/U review which is being facilitated  She reports continue tingling/numbness of finger and toes, which has been long standing since her acute care admission  She is currently on increasing dose gabapentin  She is happy with continued improvement of upper extremity function    ROS--No chest or abd pain, no palpitations nausea or vomiting  Tingling/numbness of fingers, non progressive  LBM 11/13    Therapy.  Observed in therapy---worked on LE muscle strengthening PM session yesterday and today's session  Yesterday, had transient episodic hypotension while standing, resolved afterwards  without intervention  Able to tolerating standing today, increasing duration, no hypotension  Improvement with LE ankle and knee extension noted  Working on strengthening of hip flexors  Not requiring oxygen during the day and during therapy session despite increased intensity of exercise  IDT today, function as noted       ICU Vital Signs Last 24 Hrs  T(C): 36.7 (14 Nov 2023 08:42), Max: 36.8 (13 Nov 2023 20:30)  T(F): 98.1 (14 Nov 2023 08:42), Max: 98.2 (13 Nov 2023 20:30)  HR: 85 (14 Nov 2023 08:42) (81 - 85)  BP: 98/56 (14 Nov 2023 08:42) (98/56 - 114/69)  RR: 16 (14 Nov 2023 08:42) (16 - 16)  SpO2: 95% (14 Nov 2023 08:42) (94% - 95%)  O2 Parameters below as of 14 Nov 2023 08:42  Patient On (Oxygen Delivery Method): room air  O2 Flow (L/min): 1    PHYSICAL EXAM:  Gen -  Comfortable,  at bedside -normal oxy sat 97 % on R/A  Pulm - clear  Cardiovascular - HS i and II intermittently irregular  Abdomen - Soft, non tender   Extremities - edema to b/l LE, no calf tenderness, LUE Med line    Neuro-     Cognitive - awake, alert, fully oriented, engaging, appropriate, happy speech normal      Motor -                    LEFT    UE - 4/5                    RIGHT UE - 4/5                     LEFT    LE - 3+/5, distally and 3/5 proximal                    RIGHT LE - Ankles PF 3/5 ,DF 2/5, Knee ext 3/5 Hips limited by pain Rt hip due to ulcer at ECHO access site   Sensory - decreased light tough sensation fingers and sole of foot, difusely  MSK: improvement with motor strength  Psychiatric - Mood stable, Affect WNL  Skin -- , stage 2 pressure injury to right buttock 4 x1 and left buttock 3x1, stage 2 pressure injury ti right inner thigh 0.2 x 0.2, right groin wound 10.5 x 2.0, Left groin wound 5 x 5, dressing in situ    MMT--Able to contract B/L upper back muscles, EF/EE 3+/5    < from: CT Chest No Cont (11.10.23 @ 11:36) >   CT CHEST   ORDERED BY: URI OCHOA     IMPRESSION:  Since prior evaluation June 2023 there are new predominantly   peripherally distributed reticular opacities identified within the   bilateral lung parenchyma, which appear less prominent than demonstrated   on August 26, 2023. No significant traction bronchiectasis or   honeycombing.      RECENT LABS/IMAGING                        11.2   19.75 )-----------( 436      ( 13 Nov 2023 13:05 )             36.4     11-13    134<L>  |  98  |  20  ----------------------------<  167<H>  4.1   |  28  |  0.64    Ca    9.2      13 Nov 2023 13:05    TPro  5.8<L>  /  Alb  2.8<L>  /  TBili  1.2  /  DBili  x   /  AST  66<H>  /  ALT  127<H>  /  AlkPhos  344<H>  11-13      Urinalysis Basic - ( 13 Nov 2023 13:05 )    Color: x / Appearance: x / SG: x / pH: x  Gluc: 167 mg/dL / Ketone: x  / Bili: x / Urobili: x   Blood: x / Protein: x / Nitrite: x   Leuk Esterase: x / RBC: x / WBC x   Sq Epi: x / Non Sq Epi: x / Bacteria: x      MEDICATIONS  (STANDING):  ammonium lactate 12% Lotion 1 Application(s) Topical every 12 hours  apixaban 5 milliGRAM(s) Oral every 12 hours  artificial  tears Solution 1 Drop(s) Both EYES every 12 hours  ascorbic acid 500 milliGRAM(s) Oral daily  atovaquone  Suspension 1500 milliGRAM(s) Oral daily  bisacodyl 5 milliGRAM(s) Oral <User Schedule>  dextrose 5%. 1000 milliLiter(s) (100 mL/Hr) IV Continuous <Continuous>  dextrose 5%. 1000 milliLiter(s) (50 mL/Hr) IV Continuous <Continuous>  dextrose 50% Injectable 12.5 Gram(s) IV Push once  dextrose 50% Injectable 25 Gram(s) IV Push once  dextrose 50% Injectable 25 Gram(s) IV Push once  folic acid 1 milliGRAM(s) Oral daily  gabapentin 300 milliGRAM(s) Oral three times a day  glucagon  Injectable 1 milliGRAM(s) IntraMuscular once  hemorrhoidal Ointment 1 Application(s) Rectal four times a day  hydrocortisone hemorrhoidal Suppository 1 Suppository(s) Rectal two times a day  lactobacillus acidophilus 1 Tablet(s) Oral two times a day with meals  melatonin 6 milliGRAM(s) Oral at bedtime  methylPREDNISolone 44 milliGRAM(s) Oral daily  methylPREDNISolone   Oral   metoprolol tartrate 25 milliGRAM(s) Oral two times a day  multivitamin 1 Tablet(s) Oral daily  nystatin Powder 1 Application(s) Topical two times a day  pantoprazole    Tablet 40 milliGRAM(s) Oral before breakfast  psyllium Powder 1 Packet(s) Oral daily  sodium chloride 0.9% lock flush 5 milliLiter(s) IV Push two times a day  zinc oxide 40% Paste 1 Application(s) Topical three times a day    MEDICATIONS  (PRN):  albuterol/ipratropium for Nebulization 3 milliLiter(s) Nebulizer every 6 hours PRN Shortness of Breath and/or Wheezing  ALPRAZolam 0.25 milliGRAM(s) Oral every 6 hours PRN Anxiety  aluminum hydroxide/magnesium hydroxide/simethicone Suspension 30 milliLiter(s) Oral every 4 hours PRN Dyspepsia  benzocaine/menthol Lozenge 1 Lozenge Oral two times a day PRN Sore Throat  benzonatate 100 milliGRAM(s) Oral three times a day PRN Cough  dextrose Oral Gel 15 Gram(s) Oral once PRN Blood Glucose LESS THAN 70 milliGRAM(s)/deciliter  loperamide 2 milliGRAM(s) Oral three times a day PRN Diarrhea  polyethylene glycol 3350 17 Gram(s) Oral daily PRN Constipation  SIMETHICONE SOFTGELS 250 milliGRAM(s) 250 milliGRAM(s) Oral three times a day PRN for gas  sodium chloride 0.65% Nasal 1 Spray(s) Both Nostrils every 8 hours PRN Nasal Congestion

## 2023-11-14 NOTE — PROGRESS NOTE ADULT - ASSESSMENT
Assessment/Plan:  ALIZA FOLEY is a 62 year old female with PMH of pAFIB and sciatica; who presented to [facility] ED with SOB, and was found to have groundglass opacities and interstitial lung disease. She was treated with empiric Vancomycin and Zosyn. She underwent a bronchoscopy with bronchoalveolar lavage and pulse steroids. She was given IV diuretics for increased pulmonary congestion, but her respiratory status continued to worsen.  On 9/13, she was transferred to [facility] for ECMO requirements. She was further treated with Plasmapheresis, Rituximab and high-dose steroids, with significant improvement. Her overall hospital course was complicated by thrombocytopenia (s/p several platelet transfusions), leukocytosis (infectious workup entirely negative- s/p Vanco and Ceftriaxone), AFIB with RVR (resolved with Metoprolol), dysphagia (requiring NGT), transaminitis (secondary to acute illness and hypotension),  non-occlusive RIJ DVT (started on Lovenox). Patient now admitted for a multidisciplinary rehab program. 10-05-23 @ 13:18    * Sustained functional improvement--off oxy during therapy session and at bedside, requiring it nightly,  * IDT details as noted--sustained improvement with UE function, recent improvement of LE function distal to proximal noted     #Interstitial Lung Disease and Mixed connective tissue disease  - Gait Instability, ADL impairments and Functional impairments: start Comprehensive Rehab Program of PT/OT/SLP - 3 hours a day, 5 days a week  - P&O as needed   - Non-specific Interstitial Lung Disease  - Requiring ECMO   - Improvement s/p Plasmapheresis, Rituximab and high- dose steroids   - Albuterol/Ipratropium Nebulizer every 6 hours  - Mepron 1500mg daily  - PO Medrol ( due to liver dysfunction): Plan will be to taper by ~20% every 2 weeks    Medrol   64mg x 2 weeks   56mg x 2 weeks  44mg x 2 weeks   36mg x 2 weeks - Follow up Outpatient   - Plan to wean 02 as tolerated, Continue tapering oxygen,  -  CT Chest noted 11/10---await Pulmonary    * Posttnasal drip--ENT review 11/7, declined scope, continue flonase     #pAFIB/Rt Ij thrombus  - Metoprolol 25mg BID and lovenox  --- Eliquis 5mg BID - tolerating well     # Chronic Tinnitus   - ENT review and recs appreciate, Patient already made ENT appointment for f/u    # Lymphedema both legs -chronic and Rt IJ thrombus  - ACE Wrap BL LEs  - BL LE doppler negative for DVT  - BL UE doppler with chronic thrombotic changes in RIJ     #Transaminitis --LFT Down trending   - Secondary to acute illness and hypotension at [facility]  - Outpatient follow up     #Neuropathy  - Gabapentin 300 mg BID, will consider increasing dose, increase to TID 11/10  --Work on motor strengthening, priority for UE as preferred by patient   - Vit b12 >2,000 in 9/2023  --Will consider Rhuematology f/u if needed    #Sleep/Mood  - Melatonin 6mg at HS  - Psych rec Xanax 0.25mg PRN    #Skin  - Skin: Left lower abdomen ruptured blister 3.0 x 1.0, stage 2 pressure injury to right buttock 4 x1 and left buttock 3x1, stage 2 pressure injury ti right inner thigh 0.2 x 0.2, right groin wound 10.5 x 2.0, Left groin wound 5 x 5,  right neck scab wound  -- MAD to skin folds- Nystatin to submammary and abdominal pannus BID  -- MAD to L groin- cleanse with NS, Triad cream daily  -- Mad to R groin- Nystatin powder daily   -- R groin wound-- aquacel packing, Triad to periwound, Allevyn foam- daily and PRN   - Pressure injury/Skin: OOB to Chair, PT/OT   - Offload pressure, low air loss support surfaces, T&P every 2 hours   --Wound care consult-10/9 -Multiple wounds--perineal and buttock/sacral, recs appreciated   --Suggest Continue treatments as below:   Twice daily & PRN Normal Saline Cleanse of abdominal pannus & breast folds, perineal, Left & Right inner buttock erosions.  Pat thoroughly dry.   Apply Desitin generously twice daily (& PRN) to perineal, buttocks, and left groin erosions, leave open to air.    Apply Nystatin powder to abdominal pannus and breast folds.  Suggest use of Interdry cloths in folds to 'wick' moisture.  Suggest daily Normal Saline Cleanse of right groin surgical wound, pat dry.  Apply Cavillon film barrier to intact periwound skin.  Place Aquacell strip over open area, cover with foam dressing.  - Wound care following     #Pain Mgmt   - Tylenol PRN   - Gabapentin 300mg at HS     #GI/Bowel Mgmt   - Pantoprazole  - Senna  --on hold due to recent Loose BM  - PRN: Maalox   - Stool count  --Lactobacillus BID   -- AB XR mod stool burden on transverse colon 10/23--still has mod stool burden, but moving bowel appropriately  - S/p enema and lactulose    # Alternating bowel function --continue probiotic, lactobacillus   * Leucocytosis probably steroid related and partly due to her Mixed Connective Tissue Disease, will continue monitoring     #/Bladder Mgmt   -Spontaneously voiding   - UA (11/3) negative    #FEN   #Dysphagia  - Diet - Minced & Moist  [CC]    - Dysphaiga- SLP evaluation     #Health maintenance  - Folic Acid   - MVI  - Ocean nasal spray    # Difficulty with access to peripheral veins-- Blood draws from Left arm Medline     #Precautions / PROPHYLAXIS:   - Falls  - ortho: Weight bearing status: WBAT   - Lungs: Aspiration, Incentive Spirometer   - DVT PPX: Eliquis 5 mg BID     11/13 --Labs CBC mild anemia, normal renal function, LFT elevated, non progressive     ---------------------------  IDT conference on 11/14  Social Work: Await peer to peer with CM. Provided private hire list. Lives with spouse in [place] 6 months of year. DC to apt in [place] with elevator, no steps. Supportive spouse.   Function--Supervision for eating and grooming upper body, mod assistance with lower body dressing, max assist with toileting, but patient making sustained effort engaging  Improving motor strength at ankles and knees.   Able to stand increasing duration on light gate, no further hypotensive episodes when standing  Transfers--Max Assist with light gate x 3 persons, but recently making attempts to assist  Barriers--need for max assistance for transfers  DME--Host bed, Damaris lift, WC  Est d 11/20--sustained motor improvement of upper extremity function, recently making progress with LE motor function progressing from distal to proxima  (dc date revised in view of noticeable improvement with motor strength, Stability of BP with postural changes, continued progress with oxy tapering, now requiring it nightly only (not requiring oxy during therapy, despite increased intensity), and improvement of pulmonary function as noted    Further discussion on rehab goals-among Rehab team 11/13   Discussed with therapy supervisors and team SW  I gave detailed update on my discussion with patient and her partner with regards to their therapy goals and expectations during the acute rehab treatment  Both affirm that patient is making noticeable improvement with lower extremity function--ankle strength and knee extension  They are excited with continue UE improvement markedly improved strength at wrist and elbows), and now working on strengthening the shoulder muscles  The patient remains committed and engaging well in therapy. She is aware of her current deficits. She is also aware of the fact that she continues to need intense therapy to sustained current functional gains and facilitate subsequent improve,emts  Her partner has remained supportive and admits that sometime her attempt to encourage patient to engage, may be putting patient under pressure at some times  Both are clear with regards to therapy goals, to improve on current gains and achieve ambulatory capacity with device  Team members were happy with the update    OUTPATIENT/FOLLOW UP:    Trae Whitney  Pulmonary Disease  [address], [address] [place] [address]  Phone: [phone]  Fax: [phone]  Follow Up Time: 2 weeks    Ryanne Allen  Rheumatology  [address] [address]  Phone: [phone]  Fax: [phone]  Follow Up Time: 1 week    Kyle Pierce  Internal Medicine  [address] [place] [address], [address]  [place] [address]  Phone: [phone]  Fax: [phone]  Follow Up Time: 2 weeks    Addy Powers  Pulmonary Disease  [address] [facility], [address]  [place], [place] [id number]  Phone: [phone]  Fax: [phone]  Follow Up Time:     Kwame Hawley  Critical Care Medicine  [address] [facility], [address]  [place] [address]  Phone: [phone]  Fax: [phone]  Follow Up Time:     Neena Borrego  Rheumatology  [address] [facility], [address]  [place] [address]  Phone: [phone]  Fax: [phone]  Follow Up Time:    [facility]  [address]  Scheduled Appointment: 09/13/2023  2:40 PM  ---------------------------    Non critical care  Time spent 65 mins patient review, discussion with family on update, IDT, observation in therapy, care co ordination

## 2023-11-14 NOTE — PROGRESS NOTE ADULT - SUBJECTIVE AND OBJECTIVE BOX
no complaints today      PHYSICAL EXAM:    Vital Signs Last 24 Hrs  T(F): 98.1 (14 Nov 2023 08:42), Max: 98.2 (13 Nov 2023 20:30)  HR: 85 (14 Nov 2023 08:42) (81 - 85)  BP: 98/56 (14 Nov 2023 08:42) (98/56 - 114/69)  RR: 16 (14 Nov 2023 08:42) (16 - 16)  SpO2: 95% (14 Nov 2023 08:42) (94% - 95%)  I&O's Summary      GENERAL: NAD  HEENT: NCAT +scleral icterus  CHEST/LUNG: No increased WOB, Clear to percussion bilaterally; No rales, rhonchi, wheezing  HEART: Regular rate and rhythm; No murmurs  ABDOMEN: Soft, Nontender, Nondistended; Bowel sounds present  MUSCULOSKELETAL/EXTREMITIES:  2+ Peripheral Pulses, No LE edema  PSYCH: Appropriate affect, Alert & Oriented    LABS:                        11.2   19.75 )-----------( 436      ( 13 Nov 2023 13:05 )             36.4       11-13    134  |  98  |  20  ----------------------------<  167  4.1   |  28  |  0.64    Ca    9.2      13 Nov 2023 13:05    TPro  5.8  /  Alb  2.8  /  TBili  1.2  /  DBili  x   /  AST  66  /  ALT  127  /  AlkPhos  344  11-13                09-25 Chol -- LDL -- HDL -- Trig 199 mg/dL                  Urinalysis Basic - ( 13 Nov 2023 13:05 )    Color: x / Appearance: x / SG: x / pH: x  Gluc: 167 mg/dL / Ketone: x  / Bili: x / Urobili: x   Blood: x / Protein: x / Nitrite: x   Leuk Esterase: x / RBC: x / WBC x   Sq Epi: x / Non Sq Epi: x / Bacteria: x            MEDICATIONS  (STANDING):  ammonium lactate 12% Lotion 1 Application(s) Topical every 12 hours  apixaban 5 milliGRAM(s) Oral every 12 hours  artificial  tears Solution 1 Drop(s) Both EYES every 12 hours  ascorbic acid 500 milliGRAM(s) Oral daily  atovaquone  Suspension 1500 milliGRAM(s) Oral daily  bisacodyl 5 milliGRAM(s) Oral <User Schedule>  dextrose 5%. 1000 milliLiter(s) (50 mL/Hr) IV Continuous <Continuous>  dextrose 5%. 1000 milliLiter(s) (100 mL/Hr) IV Continuous <Continuous>  dextrose 50% Injectable 25 Gram(s) IV Push once  dextrose 50% Injectable 25 Gram(s) IV Push once  dextrose 50% Injectable 12.5 Gram(s) IV Push once  folic acid 1 milliGRAM(s) Oral daily  gabapentin 300 milliGRAM(s) Oral three times a day  glucagon  Injectable 1 milliGRAM(s) IntraMuscular once  hemorrhoidal Ointment 1 Application(s) Rectal four times a day  hydrocortisone hemorrhoidal Suppository 1 Suppository(s) Rectal two times a day  lactobacillus acidophilus 1 Tablet(s) Oral two times a day with meals  melatonin 6 milliGRAM(s) Oral at bedtime  methylPREDNISolone 44 milliGRAM(s) Oral daily  methylPREDNISolone   Oral   metoprolol tartrate 25 milliGRAM(s) Oral two times a day  multivitamin 1 Tablet(s) Oral daily  nystatin Powder 1 Application(s) Topical two times a day  pantoprazole    Tablet 40 milliGRAM(s) Oral before breakfast  psyllium Powder 1 Packet(s) Oral daily  sodium chloride 0.9% lock flush 5 milliLiter(s) IV Push two times a day  zinc oxide 40% Paste 1 Application(s) Topical three times a day    MEDICATIONS  (PRN):  albuterol/ipratropium for Nebulization 3 milliLiter(s) Nebulizer every 6 hours PRN Shortness of Breath and/or Wheezing  ALPRAZolam 0.25 milliGRAM(s) Oral every 6 hours PRN Anxiety  aluminum hydroxide/magnesium hydroxide/simethicone Suspension 30 milliLiter(s) Oral every 4 hours PRN Dyspepsia  benzocaine/menthol Lozenge 1 Lozenge Oral two times a day PRN Sore Throat  benzonatate 100 milliGRAM(s) Oral three times a day PRN Cough  dextrose Oral Gel 15 Gram(s) Oral once PRN Blood Glucose LESS THAN 70 milliGRAM(s)/deciliter  loperamide 2 milliGRAM(s) Oral three times a day PRN Diarrhea  polyethylene glycol 3350 17 Gram(s) Oral daily PRN Constipation  SIMETHICONE SOFTGELS 250 milliGRAM(s) 250 milliGRAM(s) Oral three times a day PRN for gas  sodium chloride 0.65% Nasal 1 Spray(s) Both Nostrils every 8 hours PRN Nasal Congestion      Care Discussed with Consultants/Other Providers: Yes

## 2023-11-14 NOTE — PROGRESS NOTE ADULT - ASSESSMENT
Physical Examination:  GENERAL:                Alert, Oriented, No acute distress.     PULM:                     Bilateral air entry,  poor air entry at bases No Rales, No Rhonchi, No Wheezing  CVS:                         S1, S2,  No Murmur  ABD:                        Soft, nondistended, nontender, normoactive bowel sounds,   EXT:                        Trace non pitting edema, nontender, No Cyanosis or Clubbing   NEURO:                  Alert, oriented, interactive, bilateral upper and lower extremity weakness   PSYC:                      Calm, + Insight.      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 S/p pulse steroids, PLEX (9/15, 9/18, 9/20) and Rituximab (9/16 & 10/3). Course c/b severe leukopenia s/p filgastrim, shock liver with slow to resolved hyperbilirubinemia, RIJ DVT, oropharyngeal dysphagia, and recurrent SVT. Repeat CT chest on 9/30 with markedly improved bilateral groundglass opacities with resolved lung consolidations. Now in acute rehab on 4 lpm NC oxygen and tapering dose of medrol.     Problem List:  1. Interstitial lung disease   2. Mixed connective tissue disease   3. Acute hypoxia  4. RIJ DVT     Plan:  - Stable respiratory status,Room air now   - Cont. Methylprednisolone PO, taper as per rheumatology recs. from [facility]   - Continue atovaquone for PCP prophylaxis  - S/p Rituximab 9/16 and 10/3  - S/p PLEX during MICU stay  - Repeat CT scan reviewed, findings do not sugest acute infection but chronic infiltrative disease that is improving, no acute intervention required, will recommend out patient pulmonary follow up with repeat CT after rehab stay.  - No evidence of active infection, monitor off antibiotics, noted increased WBC, unsure if from steroids. monitor closely  - Cont. anticoagulation for DVT associated with ECMO cannula  - Consider incentive spirometry   - Care per primary team  - Discussed with spouse at bedside   - PT OOB as tolerated  - Outpatient pulmonary and rheumatology follow up upon discharge

## 2023-11-15 PROCEDURE — 99232 SBSQ HOSP IP/OBS MODERATE 35: CPT

## 2023-11-15 PROCEDURE — 99233 SBSQ HOSP IP/OBS HIGH 50: CPT

## 2023-11-15 RX ORDER — METOPROLOL TARTRATE 50 MG
12.5 TABLET ORAL
Refills: 0 | Status: DISCONTINUED | OUTPATIENT
Start: 2023-11-15 | End: 2023-12-20

## 2023-11-15 RX ORDER — ALPRAZOLAM 0.25 MG
0.25 TABLET ORAL EVERY 6 HOURS
Refills: 0 | Status: DISCONTINUED | OUTPATIENT
Start: 2023-11-15 | End: 2023-11-21

## 2023-11-15 RX ADMIN — APIXABAN 5 MILLIGRAM(S): 2.5 TABLET, FILM COATED ORAL at 08:15

## 2023-11-15 RX ADMIN — Medication 1 DROP(S): at 20:05

## 2023-11-15 RX ADMIN — Medication 6 MILLIGRAM(S): at 20:05

## 2023-11-15 RX ADMIN — APIXABAN 5 MILLIGRAM(S): 2.5 TABLET, FILM COATED ORAL at 20:05

## 2023-11-15 RX ADMIN — GABAPENTIN 300 MILLIGRAM(S): 400 CAPSULE ORAL at 08:15

## 2023-11-15 RX ADMIN — Medication 500 MILLIGRAM(S): at 14:35

## 2023-11-15 RX ADMIN — SODIUM CHLORIDE 5 MILLILITER(S): 9 INJECTION INTRAMUSCULAR; INTRAVENOUS; SUBCUTANEOUS at 17:50

## 2023-11-15 RX ADMIN — ZINC OXIDE 1 APPLICATION(S): 200 OINTMENT TOPICAL at 13:04

## 2023-11-15 RX ADMIN — ATOVAQUONE 1500 MILLIGRAM(S): 750 SUSPENSION ORAL at 14:35

## 2023-11-15 RX ADMIN — Medication 1 TABLET(S): at 08:15

## 2023-11-15 RX ADMIN — Medication 12.5 MILLIGRAM(S): at 17:51

## 2023-11-15 RX ADMIN — NYSTATIN CREAM 1 APPLICATION(S): 100000 CREAM TOPICAL at 08:10

## 2023-11-15 RX ADMIN — Medication 1 APPLICATION(S): at 08:10

## 2023-11-15 RX ADMIN — ZINC OXIDE 1 APPLICATION(S): 200 OINTMENT TOPICAL at 22:42

## 2023-11-15 RX ADMIN — PANTOPRAZOLE SODIUM 40 MILLIGRAM(S): 20 TABLET, DELAYED RELEASE ORAL at 08:15

## 2023-11-15 RX ADMIN — SODIUM CHLORIDE 5 MILLILITER(S): 9 INJECTION INTRAMUSCULAR; INTRAVENOUS; SUBCUTANEOUS at 08:11

## 2023-11-15 RX ADMIN — Medication 44 MILLIGRAM(S): at 08:16

## 2023-11-15 RX ADMIN — GABAPENTIN 300 MILLIGRAM(S): 400 CAPSULE ORAL at 13:02

## 2023-11-15 RX ADMIN — Medication 1 TABLET(S): at 17:50

## 2023-11-15 RX ADMIN — GABAPENTIN 300 MILLIGRAM(S): 400 CAPSULE ORAL at 20:05

## 2023-11-15 RX ADMIN — ZINC OXIDE 1 APPLICATION(S): 200 OINTMENT TOPICAL at 08:11

## 2023-11-15 RX ADMIN — Medication 25 MILLIGRAM(S): at 08:15

## 2023-11-15 RX ADMIN — Medication 1 DROP(S): at 08:10

## 2023-11-15 RX ADMIN — Medication 1 TABLET(S): at 14:35

## 2023-11-15 RX ADMIN — Medication 1 MILLIGRAM(S): at 14:36

## 2023-11-15 NOTE — ADVANCED PRACTICE NURSE CONSULT - APN SPECIALTY LIST
Wound Ostomy Care

## 2023-11-15 NOTE — PROGRESS NOTE ADULT - SUBJECTIVE AND OBJECTIVE BOX
no acute events overnight      PHYSICAL EXAM:    Vital Signs Last 24 Hrs  T(F): 98.2 (15 Nov 2023 08:20), Max: 98.2 (15 Nov 2023 08:20)  HR: 100 (15 Nov 2023 08:20) (84 - 100)  BP: 104/63 (15 Nov 2023 08:20) (104/63 - 115/71)  RR: 16 (15 Nov 2023 08:20) (16 - 16)  SpO2: 95% (15 Nov 2023 08:20) (95% - 95%)  I&O's Summary      GENERAL: NAD  HEENT: NCAT  CHEST/LUNG: No increased WOB, Clear to percussion bilaterally; No rales, rhonchi, wheezing  HEART: Regular rate and rhythm; No murmurs  ABDOMEN: Soft, Nontender, Nondistended; Bowel sounds present  MUSCULOSKELETAL/EXTREMITIES:  2+ Peripheral Pulses, No LE edema  PSYCH: Appropriate affect, Alert & Oriented    LABS:                        11.2   19.75 )-----------( 436      ( 13 Nov 2023 13:05 )             36.4       11-13    134  |  98  |  20  ----------------------------<  167  4.1   |  28  |  0.64    Ca    9.2      13 Nov 2023 13:05    TPro  5.8  /  Alb  2.8  /  TBili  1.2  /  DBili  x   /  AST  66  /  ALT  127  /  AlkPhos  344  11-13                09-25 Chol -- LDL -- HDL -- Trig 199 mg/dL                  Urinalysis Basic - ( 13 Nov 2023 13:05 )    Color: x / Appearance: x / SG: x / pH: x  Gluc: 167 mg/dL / Ketone: x  / Bili: x / Urobili: x   Blood: x / Protein: x / Nitrite: x   Leuk Esterase: x / RBC: x / WBC x   Sq Epi: x / Non Sq Epi: x / Bacteria: x            MEDICATIONS  (STANDING):  ammonium lactate 12% Lotion 1 Application(s) Topical every 12 hours  apixaban 5 milliGRAM(s) Oral every 12 hours  artificial  tears Solution 1 Drop(s) Both EYES every 12 hours  ascorbic acid 500 milliGRAM(s) Oral daily  atovaquone  Suspension 1500 milliGRAM(s) Oral daily  bisacodyl 5 milliGRAM(s) Oral <User Schedule>  dextrose 5%. 1000 milliLiter(s) (50 mL/Hr) IV Continuous <Continuous>  dextrose 5%. 1000 milliLiter(s) (100 mL/Hr) IV Continuous <Continuous>  dextrose 50% Injectable 12.5 Gram(s) IV Push once  dextrose 50% Injectable 25 Gram(s) IV Push once  dextrose 50% Injectable 25 Gram(s) IV Push once  folic acid 1 milliGRAM(s) Oral daily  gabapentin 300 milliGRAM(s) Oral three times a day  glucagon  Injectable 1 milliGRAM(s) IntraMuscular once  hemorrhoidal Ointment 1 Application(s) Rectal four times a day  hydrocortisone hemorrhoidal Suppository 1 Suppository(s) Rectal two times a day  lactobacillus acidophilus 1 Tablet(s) Oral two times a day with meals  melatonin 6 milliGRAM(s) Oral at bedtime  methylPREDNISolone   Oral   methylPREDNISolone 44 milliGRAM(s) Oral daily  metoprolol tartrate 12.5 milliGRAM(s) Oral two times a day  multivitamin 1 Tablet(s) Oral daily  nystatin Powder 1 Application(s) Topical two times a day  pantoprazole    Tablet 40 milliGRAM(s) Oral before breakfast  psyllium Powder 1 Packet(s) Oral daily  sodium chloride 0.9% lock flush 5 milliLiter(s) IV Push two times a day  zinc oxide 40% Paste 1 Application(s) Topical three times a day    MEDICATIONS  (PRN):  albuterol/ipratropium for Nebulization 3 milliLiter(s) Nebulizer every 6 hours PRN Shortness of Breath and/or Wheezing  ALPRAZolam 0.25 milliGRAM(s) Oral every 6 hours PRN Anxiety  aluminum hydroxide/magnesium hydroxide/simethicone Suspension 30 milliLiter(s) Oral every 4 hours PRN Dyspepsia  benzocaine/menthol Lozenge 1 Lozenge Oral two times a day PRN Sore Throat  benzonatate 100 milliGRAM(s) Oral three times a day PRN Cough  dextrose Oral Gel 15 Gram(s) Oral once PRN Blood Glucose LESS THAN 70 milliGRAM(s)/deciliter  loperamide 2 milliGRAM(s) Oral three times a day PRN Diarrhea  polyethylene glycol 3350 17 Gram(s) Oral daily PRN Constipation  SIMETHICONE SOFTGELS 250 milliGRAM(s) 250 milliGRAM(s) Oral three times a day PRN for gas  sodium chloride 0.65% Nasal 1 Spray(s) Both Nostrils every 8 hours PRN Nasal Congestion      Care Discussed with Consultants/Other Providers: Yes

## 2023-11-15 NOTE — PROGRESS NOTE ADULT - ASSESSMENT
Assessment/Plan:  ALIZA FOLEY is a 62 year old female with PMH of pAFIB and sciatica; who presented to [facility] ED with SOB, and was found to have groundglass opacities and interstitial lung disease. She was treated with empiric Vancomycin and Zosyn. She underwent a bronchoscopy with bronchoalveolar lavage and pulse steroids. She was given IV diuretics for increased pulmonary congestion, but her respiratory status continued to worsen.  On 9/13, she was transferred to [facility] for ECMO requirements. She was further treated with Plasmapheresis, Rituximab and high-dose steroids, with significant improvement. Her overall hospital course was complicated by thrombocytopenia (s/p several platelet transfusions), leukocytosis (infectious workup entirely negative- s/p Vanco and Ceftriaxone), AFIB with RVR (resolved with Metoprolol), dysphagia (requiring NGT), transaminitis (secondary to acute illness and hypotension),  non-occlusive RIJ DVT (started on Lovenox). Patient now admitted for a multidisciplinary rehab program. 10-05-23 @ 13:18    * Sustained functional improvement including progress with Wt bearing LE and improvement with ability at transfers  * Improvement of lung status, and imaging as noted by Pulmonary team  * Continue progress with oxy tapering    #Interstitial Lung Disease and Mixed connective tissue disease  - Gait Instability, ADL impairments and Functional impairments: start Comprehensive Rehab Program of PT/OT/SLP - 3 hours a day, 5 days a week  - P&O as needed   - Non-specific Interstitial Lung Disease  - Requiring ECMO   - Improvement s/p Plasmapheresis, Rituximab and high- dose steroids   - Albuterol/Ipratropium Nebulizer every 6 hours  - Mepron 1500mg daily  - PO Medrol ( due to liver dysfunction): Plan will be to taper by ~20% every 2 weeks    Medrol   64mg x 2 weeks   56mg x 2 weeks  44mg x 2 weeks   36mg x 2 weeks - Follow up Outpatient   - Plan to wean 02 as tolerated, Continue tapering oxygen,  -  CT Chest noted 11/10---Resp f/u review  11/14, appreciated  They reports sustained improvement, clinical (normal oxy sats on R/A, sustained progress with oxy tapering), and radiological (no acute findings on recent CT Chest, rather residual chronic infiltrative diesease noted, with recommendation to repeat CT after Acute rehab treatment     * Posttnasal drip--ENT review 11/7, declined scope, continue flonase     #pAFIB/Rt Ij thrombus  - Metoprolol 25mg BID and lovenox  --- Eliquis 5mg BID - tolerating well     # Chronic Tinnitus   - ENT review and recs appreciate, Patient already made ENT appointment for f/u    # Lymphedema both legs -chronic and Rt IJ thrombus  - ACE Wrap BL LEs  - BL LE doppler negative for DVT  - BL UE doppler with chronic thrombotic changes in RIJ     #Transaminitis --LFT Down trending   - Secondary to acute illness and hypotension at [facility]  - Outpatient follow up     #Neuropathy  - Gabapentin 300 mg BID, will consider increasing dose, increase to TID 11/10  --Work on motor strengthening, priority for UE as preferred by patient   - Vit b12 >2,000 in 9/2023  --Will consider Rhuematology f/u if needed    #Sleep/Mood  - Melatonin 6mg at HS  - Psych rec Xanax 0.25mg PRN    #Skin  - Skin: Left lower abdomen ruptured blister 3.0 x 1.0, stage 2 pressure injury to right buttock 4 x1 and left buttock 3x1, stage 2 pressure injury ti right inner thigh 0.2 x 0.2, right groin wound 10.5 x 2.0, Left groin wound 5 x 5,  right neck scab wound  -- MAD to skin folds- Nystatin to submammary and abdominal pannus BID  -- MAD to L groin- cleanse with NS, Triad cream daily  -- Mad to R groin- Nystatin powder daily   -- R groin wound-- aquacel packing, Triad to periwound, Allevyn foam- daily and PRN   - Pressure injury/Skin: OOB to Chair, PT/OT   - Offload pressure, low air loss support surfaces, T&P every 2 hours   --Wound care consult-10/9 -Multiple wounds--perineal and buttock/sacral, recs appreciated   --Suggest Continue treatments as below:   Twice daily & PRN Normal Saline Cleanse of abdominal pannus & breast folds, perineal, Left & Right inner buttock erosions.  Pat thoroughly dry.   Apply Desitin generously twice daily (& PRN) to perineal, buttocks, and left groin erosions, leave open to air.    Apply Nystatin powder to abdominal pannus and breast folds.  Suggest use of Interdry cloths in folds to 'wick' moisture.  Suggest daily Normal Saline Cleanse of right groin surgical wound, pat dry.  Apply Cavillon film barrier to intact periwound skin.  Place Aquacell strip over open area, cover with foam dressing.  - Wound care following     #Pain Mgmt   - Tylenol PRN   - Gabapentin 300mg at HS     #GI/Bowel Mgmt   - Pantoprazole  - Senna  --on hold due to recent Loose BM  - PRN: Maalox   - Stool count  --Lactobacillus BID   -- AB XR mod stool burden on transverse colon 10/23--still has mod stool burden, but moving bowel appropriately  - S/p enema and lactulose    # Alternating bowel function --continue probiotic, lactobacillus   * Leucocytosis probably steroid related and partly due to her Mixed Connective Tissue Disease, will continue monitoring     #/Bladder Mgmt   -Spontaneously voiding   - UA (11/3) negative    #FEN   #Dysphagia  - Diet - Minced & Moist  [CC]    - Dysphaiga- SLP evaluation     #Health maintenance  - Folic Acid   - MVI  - Ocean nasal spray    # Difficulty with access to peripheral veins-- Blood draws from Left arm Medline     #Precautions / PROPHYLAXIS:   - Falls  - ortho: Weight bearing status: WBAT   - Lungs: Aspiration, Incentive Spirometer   - DVT PPX: Eliquis 5 mg BID     11/13 --Labs CBC mild anemia, normal renal function, LFT elevated, non progressive     IDT conference on 11/14  Social Work: Await peer to peer with CM. Provided private hire list. Lives with spouse in [place] 6 months of year. DC to apt in [place] with elevator, no steps. Supportive spouse.   Function--Supervision for eating and grooming upper body, mod assistance with lower body dressing, max assist with toileting, but patient making sustained effort engaging  Improving motor strength at ankles and knees.   Able to stand increasing duration on light gate, no further hypotensive episodes when standing  Transfers--Max Assist with light gate x 3 persons, but recently making attempts to assist  Barriers--need for max assistance for transfers  DME--Host bed, Damaris lift, WC  Est d 11/20--sustained motor improvement of upper extremity function, recently making progress with LE motor function progressing from distal to proxima  (dc date revised in view of noticeable improvement with motor strength, Stability of BP with postural changes, continued progress with oxy tapering, now requiring it nightly only (not requiring oxy during therapy, despite increased intensity), and improvement of pulmonary function as noted    Further discussion on rehab goals-among Rehab team 11/13   Discussed with therapy supervisors and team SW  I gave detailed update on my discussion with patient and her partner with regards to their therapy goals and expectations during the acute rehab treatment  Both affirm that patient is making noticeable improvement with lower extremity function--ankle strength and knee extension  They are excited with continue UE improvement markedly improved strength at wrist and elbows), and now working on strengthening the shoulder muscles  The patient remains committed and engaging well in therapy. She is aware of her current deficits. She is also aware of the fact that she continues to need intense therapy to sustained current functional gains and facilitate subsequent improve,emts  Her partner has remained supportive and admits that sometime her attempt to encourage patient to engage, may be putting patient under pressure at some times  Both are clear with regards to therapy goals, to improve on current gains and achieve ambulatory capacity with device  Team members were happy with the update    OUTPATIENT/FOLLOW UP:    Trae Whitney  Pulmonary Disease  [address], [address] [place] [address]  Phone: [phone]  Fax: [phone]  Follow Up Time: 2 weeks    Ryanne Allen  Rheumatology  [address] [address]  Phone: [phone]  Fax: [phone]  Follow Up Time: 1 week    Kyle Pierce  Internal Medicine  [address] [place] [address], [address]  [place] [address]  Phone: [phone]  Fax: [phone]  Follow Up Time: 2 weeks    Addy Powers  Pulmonary Disease  [address] [facility], [address]  [place], [place] [id number]  Phone: [phone]  Fax: [phone]  Follow Up Time:     Kwame Hawley  Critical Care Medicine  [address] [facility], [address] [address]  Phone: [phone]  Fax: [phone]  Follow Up Time:     Neena Borrego  Rheumatology  [address] [place] [address], [address]  [place] [address]  Phone: [phone]  Fax: [phone]  Follow Up Time:    [facility]  [facility] [address]  Scheduled Appointment: 09/13/2023  2:40 PM    Non critical care  Time spent 64 mins patient review, discussion with family on update, IDT, observation in therapy, care co ordination

## 2023-11-15 NOTE — ADVANCED PRACTICE NURSE CONSULT - RECOMMEDATIONS
Suggest continue daily Normal Saline Cleanse of right groin wound, pat thoroughly dry.  Apply Cavillon film barrier daily to intact periwound skin, allow to dry.  Place Aquacell AG over wound bed (with light packing into depth) daily, cover with foam dressing.    Suggest continue Interdry cloth to skin folds PRN.    Continue:  Offload all bony prominences with Turn & Position at least every 2 hours while in bed.  Limit time seated in chair to no more than 2 hours consecutively.  Limit number of layers between patient & pressure relieving mattress, no diaper in bed, only 1 purple incontinence pad.  Maintain clean dry diaper area with frequent rounding, scheduled toileting.  Offload heels by floating on pillow or with offloading booties at all times while in bed.   Nutritional support & hydration per Dietician's recommendations.  
Suggest continue daily Normal Saline Cleanse of right groin wound, pat thoroughly dry.  Apply Cavillon film barrier daily to intact periwound skin, allow to dry.  Place Aquacell AG over wound bed (with light packing into depth) daily, cover with foam dressing.    Suggest continue Nystatin powder & Interdry cloth to MAD in skin folds.    Continue:  Offload all bony prominences with Turn & Position at least every 2 hours while in bed.  Limit time seated in chair to no more than 2 hours consecutively.  Limit number of layers between patient & pressure relieving mattress, no diaper in bed, only 1 purple incontinence pad.  Maintain clean dry diaper area with frequent rounding, scheduled toileting.  Offload heels by floating on pillow or with offloading booties at all times while in bed.   Nutritional support & hydration per Dietician's recommendations.  
Suggest continue daily Normal Saline Cleanse of right groin wound, pat thoroughly dry.  Apply Cavillon film barrier daily to intact periwound skin, allow to dry.  Place Aquacell AG over wound bed (with light packing into depth) daily, cover with foam dressing.  Plan to change Right Groin dressing & reassess drainage character tomorrow 10/18.    Suggest continue Nystatin powder & Interdry cloth to MAD in skin folds.    Continue:  Offload all bony prominences with Turn & Position at least every 2 hours while in bed.  Limit time seated in chair to no more than 2 hours consecutively.  Limit number of layers between patient & pressure relieving mattress, no diaper in bed, only 1 purple incontinence pad.  Maintain clean dry diaper area with frequent rounding, scheduled toileting.  Offload heels by floating on pillow or with offloading booties at all times while in bed.   Nutritional support & hydration per Dietician's recommendations.  
Suggest Continue treatments as below:   Twice daily & PRN Normal Saline Cleanse of abdominal pannus & breast folds, perineal, Left & Right inner buttock erosions.  Pat thoroughly dry.   Apply Desitin generously twice daily (& PRN) to perineal, buttocks, and left groin erosions, leave open to air.    Apply Nystatin powder to abdominal pannus and breast folds.  Suggest use of Interdry cloths in folds to 'wick' moisture.    Suggest daily Normal Saline Cleanse of right groin surgical wound, pat dry.  Apply Cavillon film barrier to intact periwound skin.  Place Aquacell strip over open area, cover with foam dressing.    Continue:  Maintain clean, dry diaper area with frequent rounding.   Patient requests to use diaper even while in bed, despite education to omit diaper & allow skin to be open to air.   Suggest scheduled toileting.  Continue support surface, limit number of layers between mattress & patient.  Offload all bony prominences with strict Turn & Position at least every 2 hours while in bed.  Obtain ROHO cushion for wheelchair from OT.  Limit time seated in wheelchair to no more than 2 hours consecutively, (back to bed between therapies).  Offload heels by floating on pillow or with offloading booties at all times while in bed.   Nutritional support per Dietician's recommendations. 
Suggest continue daily Normal Saline Cleanse of right groin wound, pat thoroughly dry.  Apply Cavillon film barrier daily to intact periwound skin, allow to dry.  Place Aquacell AG over wound bed (with light packing into depth) daily, cover with foam dressing.    Suggest continue Nystatin powder & Interdry cloth to MAD in skin folds.    Continue:  Offload all bony prominences with Turn & Position at least every 2 hours while in bed.  Limit time seated in chair to no more than 2 hours consecutively.  Limit number of layers between patient & pressure relieving mattress, no diaper in bed, only 1 purple incontinence pad.  Maintain clean dry diaper area with frequent rounding, scheduled toileting.  Offload heels by floating on pillow or with offloading booties at all times while in bed.   Nutritional support & hydration per Dietician's recommendations.  
Suggest continue daily Normal Saline Cleanse of right groin wound, pat thoroughly dry.  Apply Cavillon film barrier daily to intact periwound skin, allow to dry.  Place Aquacell AG over wound bed (with light packing into depth) daily, cover with foam dressing.    Suggest continue Nystatin powder & Interdry cloth to MAD in skin folds.    Continue:  Offload all bony prominences with Turn & Position at least every 2 hours while in bed.  Limit time seated in chair to no more than 2 hours consecutively.  Limit number of layers between patient & pressure relieving mattress, no diaper in bed, only 1 purple incontinence pad.  Maintain clean dry diaper area with frequent rounding, scheduled toileting.  Offload heels by floating on pillow or with offloading booties at all times while in bed.   Nutritional support & hydration per Dietician's recommendations.  
Suggest continue daily Normal Saline Cleanse of right groin wound, pat thoroughly dry.  Apply Cavillon film barrier daily to intact periwound skin, allow to dry.  Place Aquacell AG over wound bed daily, cover with foam dressing.    If groin strap is used in PT, please additionally cover wound area with ABD pad over foam, then place folded towel between patient's garments & strap.    Suggest continue Interdry cloth to skin folds PRN.    Continue:  Offload all bony prominences with Turn & Position at least every 2 hours while in bed.  Limit time seated in chair to no more than 2 hours consecutively.  Limit number of layers between patient & pressure relieving mattress, no diaper in bed, only 1 purple incontinence pad.  Maintain clean dry diaper area with frequent rounding, scheduled toileting.  Offload heels by floating on pillow or with offloading booties at all times while in bed.   Nutritional support & hydration per Dietician's recommendations.  
Suggest twice daily & PRN Normal Saline Cleanse of abdominal pannus & breast folds, perineal, Left & Right inner buttock erosions.  Pat thoroughly dry.   Apply Desitin generously twice daily (& PRN) to perineal, buttocks, and left groin erosions, leave open to air.    Apply Nystatin powder to abdominal pannus and breast folds.  Suggest use of Interdry cloths in folds to 'wick' moisture.    Suggest daily Normal Saline Cleanse of right groin surgical wound, pat dry.  Apply Cavillon film barrier to intact periwound skin.  Place Aquacell strip over open area, cover with foam dressing.    Maintain clean, dry diaper area with frequent rounding.   Patient requests to use diaper even while in bed, despite education to omit diaper & allow skin to be open to air.   Suggest scheduled toileting.  Continue support surface, limit number of layers between mattress & patient.  Offload all bony prominences with strict Turn & Position at least every 2 hours while in bed.  Obtain ROHO cushion for wheelchair from OT.  Limit time seated in wheelchair to no more than 2 hours consecutively, (back to bed between therapies).  Offload heels by floating on pillow or with offloading booties at all times while in bed.   Nutritional support per Dietician's recommendations.

## 2023-11-15 NOTE — ADVANCED PRACTICE NURSE CONSULT - REASON FOR CONSULT
Assessment & Recommendations for Present on Admission Wounds  LATE ENTRY: Patient seen on 10/9/23 
Re-Assessment of Right Groin Wound
Re-Assessment & Dressing Change for Right Groin Wound
Re-Assessment of Right Groin Wound, MAD/IAD

## 2023-11-15 NOTE — ADVANCED PRACTICE NURSE CONSULT - ASSESSMENT
Patient admitted to Acute Rehab after complicated hospital stay, A&Ox4.  Looks improved today, in good spirits, partner at bedside.  Unable to assess wounds as patient just returned to bed via Damaris, had bowel movement & urinary incontinence episode, awaiting cleaning.  Patient reports wounds are somewhat improved, still reports pain with prolong sitting in wheelchair.  Interdry not previously available from storeroom, obtained for patient from another unit, had order placed for additional supply.
Patient seen & examined in bed, about to go to therapy, wife also present at bedside.  Right groin wound continuing to show signs of improvement.  There remains a small area of firmly adherent fibrinous slough (<10%), red granular & adipose tissue visible.  The wound bed still with small (~1 cm) "dimple" centrally, but does not tunnel.  Prior dressing from yesterday with scant to moderate drainage, no odor, no green discoloration.
Patient seen & examined in bed, partner Kiara also present at bedside.  Right groin wound size is again smaller compared to last measurement, open area now measures 0.6 x 1.8 x 0.1 cm.  The character of the wound continues to improve; only very scant drainage noted on prior dressing.  There is no slough, the open area with red granulation tissue filled in to level of the skin.  Epithelialization is evident at the wound edges & continues to migrate over the surface.   Periwound skin is intact, without edema, warmth, induration or fluctuance.   Noted is a very thin ring of ecchymosis at wound edges secondary to groin strap of equipment used for walking in PT.
Patient seen & examined in bed earlier this afternoon, wife also present at bedside.  Appears well, in good spirits.  Right groin wound showing signs of improvement, less slough, red granular & adipose tissue visible.  Of note there is greenish tinge & slight odor to drainage present on prior dressing.  (Patient & wife state it was not changed today)  Abdominal pannus MAD wound is improved as well, much smaller, less erythema & maceration.
Patient admitted to Acute Rehab after complicated hospital stay, A&Ox4, partner at bedside.  Patient presents with Right groin surgical wound s/p ECMO, with small amount of yellow slough noted.  There are perineal partial thickness skin erosions to left & right inner buttocks, and on the Left groin.   IAD/MAD is more likely a  component than pressure.  Patient with large abdominal pannus, skin in folds red, rashy with skin erosion, L>R.  There is submammary MAD as well, without break in skin.  Patient states Triad Cream "newman" the open areas, has preference for Desitin.  
Patient seen & examined in bed, after lunch, partner also present at bedside.  Right groin wound continues to show signs of improvement.  Size has decreased, now measures 9 x 2 x 1.5 cm.  There remains a small area of firmly adherent fibrinous slough (<10%) & adipose tissue is visible.  The red granular tissue has increased, now with scant bleeding upon cleansing.   The wound bed still with small (~1 cm) "dimple" centrally, but does not tunnel.  Prior dressing from yesterday with scant to moderate drainage, no odor, no green discoloration.
Patient seen & examined in bed, after therapy, partner also present at bedside.  Right groin wound size is similar to last measurement, but character of wound greatly improved.  There remains only a small amount of diffuse, moist yellow slough .  The red granular tissue continues to increase, scant bleeding noted upon dressing removal & cleansing.   The wound bed still with small  "dimple" centrally, but does not tunnel.  Periwound skin is intact, without edema, erythema, warmth, induration or fluctuance.
Patient seen & examined in bed, partner Kiara also present at bedside.  Right groin wound size is smaller compared to last measurement, now measures 0.8 x 3 x 0.2 cm.  The character of the wound has greatly improved.  There is no slough, the open area is with granulation tissue filled in to level of the skin.  Epithelialization is evident at the wound edges & beginning to migrate over the surface.   The wound bed Periwound skin is intact, without edema, erythema, warmth, induration or fluctuance.

## 2023-11-15 NOTE — PROGRESS NOTE ADULT - ASSESSMENT
62F with AF, hx sciatica, newly diagnosed ILD (likely associated with MCTD +ARIANA, +RNP, +Sm) with exacerbation requiring ECMO / plasmapharesis / Rituximab / steriods, now at acute rehab    #Transaminitis  -slightly worsened 11/13, repeat LFTs tomorrow    #hyponatremia  - 134 continue to follow. If continues to downtrend, will obtain serum osm, urine sodium    #leukocytosis  - wbc increased to 19, remains afebrile, continue to monitor, likely due to steroids.  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   -c/w steroids - taper as scheduled (2 week intervals)   -c/w nebs  -c/w PT/OT  -Mepron for PCP ppx while on steroids     #PAF  #Non-occlusive right IJ thrombus   -c/w Eliquis  -c/w lopressor -- held today due to systolic BP <100, will decrease to lopressor 12.5mg po BID  - goal HR for pAfib is <110    #Anxiety  -appreciate psych follow-up  -c/w Xanax PRN      #DVT ppx: Eliquis

## 2023-11-15 NOTE — PROGRESS NOTE ADULT - SUBJECTIVE AND OBJECTIVE BOX
SUBJECTIVE/ROS:  Patient seen and examined, at bed side and observed in therapy today  Reports uneventful night rest  Having regular BMs  No acute pain    ROS--No chest or abd pain, no palpitations nausea or vomiting  Tingling/numbness of fingers, non progressive  LBM 11/14    Therapy.  Observed in therapy.  Significant improvements noted today  Worked on wt bearing with both lower extremities, Stood for considerable period at light gate  Transferred from bed to  slide board, instead of usual kandi lift transfer  Improved participation and motivation noted by therapists  Did not require oxygen during all the intense exercise, BP remained stable  Other details and progress as noted in IDT yesterday (also in the lower part of this note), in bold    Cliniccal progress--Resp f/u review yesterday 11/14, appreciated  They reports sustained improvement, clinical (normal oxy sats on R/A, sustained progress with oxy tapering), and radiological (no acute findings on recent CT Chest, rather residual chronic infiltrative diesease noted, with recommendation to repeat CT after Acute rehab treatment     Mild leucocytosis (? steroid related) and elevated by stable LFT, no signs of acute liver disease    Discussed all details with family and patient. All happy with sustained improvement      ICU Vital Signs Last 24 Hrs  T(C): 36.8 (15 Nov 2023 08:20), Max: 36.8 (15 Nov 2023 08:20)  T(F): 98.2 (15 Nov 2023 08:20), Max: 98.2 (15 Nov 2023 08:20)  HR: 100 (15 Nov 2023 08:20) (84 - 100)  BP: 104/63 (15 Nov 2023 08:20) (104/63 - 115/71)  RR: 16 (15 Nov 2023 08:20) (16 - 16)  SpO2: 95% (15 Nov 2023 08:20) (95% - 95%)  O2 Parameters below as of 15 Nov 2023 08:20      PHYSICAL EXAM:  Gen -  Comfortable,  at bedside -normal oxy sat  Pulm - clear  Cardiovascular - HS i and II intermittently irregular  Abdomen - Soft, non tender   Extremities - edema to b/l LE, no calf tenderness, LUE Med line    Neuro-     Cognitive - awake, alert, fully oriented, engaging, appropriate, happy speech normal      Motor -                    LEFT    UE - 4/5                    RIGHT UE - 4/5                     LEFT    LE - 3+/5, distally and 3/5 proximal                    RIGHT LE - Ankles PF 3/5 ,DF 2/5, Knee ext 3/5 Hips limited by pain Rt hip due to ulcer at ECHO access site   Sensory - decreased light tough sensation fingers and sole of foot, difusely  MSK: improvement with motor strength  Psychiatric - Mood stable, Affect WNL  Skin -- , stage 2 pressure injury to right buttock 4 x1 and left buttock 3x1, stage 2 pressure injury ti right inner thigh 0.2 x 0.2, right groin wound 10.5 x 2.0, Left groin wound 5 x 5, dressing in situ    MMT--Able to contract B/L upper back muscles, EF/EE 3+/5      RECENT LABS/IMAGING                        11.2   19.75 )-----------( 436      ( 13 Nov 2023 13:05 )             36.4     11-13    134<L>  |  98  |  20  ----------------------------<  167<H>  4.1   |  28  |  0.64    Ca    9.2      13 Nov 2023 13:05    TPro  5.8<L>  /  Alb  2.8<L>  /  TBili  1.2  /  DBili  x   /  AST  66<H>  /  ALT  127<H>  /  AlkPhos  344<H>  11-13      Urinalysis Basic - ( 13 Nov 2023 13:05 )    Color: x / Appearance: x / SG: x / pH: x  Gluc: 167 mg/dL / Ketone: x  / Bili: x / Urobili: x   Blood: x / Protein: x / Nitrite: x   Leuk Esterase: x / RBC: x / WBC x   Sq Epi: x / Non Sq Epi: x / Bacteria: x      MEDICATIONS  (STANDING):  ammonium lactate 12% Lotion 1 Application(s) Topical every 12 hours  apixaban 5 milliGRAM(s) Oral every 12 hours  artificial  tears Solution 1 Drop(s) Both EYES every 12 hours  ascorbic acid 500 milliGRAM(s) Oral daily  atovaquone  Suspension 1500 milliGRAM(s) Oral daily  bisacodyl 5 milliGRAM(s) Oral <User Schedule>  dextrose 5%. 1000 milliLiter(s) (50 mL/Hr) IV Continuous <Continuous>  dextrose 5%. 1000 milliLiter(s) (100 mL/Hr) IV Continuous <Continuous>  dextrose 50% Injectable 25 Gram(s) IV Push once  dextrose 50% Injectable 25 Gram(s) IV Push once  dextrose 50% Injectable 12.5 Gram(s) IV Push once  folic acid 1 milliGRAM(s) Oral daily  gabapentin 300 milliGRAM(s) Oral three times a day  glucagon  Injectable 1 milliGRAM(s) IntraMuscular once  hemorrhoidal Ointment 1 Application(s) Rectal four times a day  hydrocortisone hemorrhoidal Suppository 1 Suppository(s) Rectal two times a day  lactobacillus acidophilus 1 Tablet(s) Oral two times a day with meals  melatonin 6 milliGRAM(s) Oral at bedtime  methylPREDNISolone 44 milliGRAM(s) Oral daily  methylPREDNISolone   Oral   metoprolol tartrate 25 milliGRAM(s) Oral two times a day  multivitamin 1 Tablet(s) Oral daily  nystatin Powder 1 Application(s) Topical two times a day  pantoprazole    Tablet 40 milliGRAM(s) Oral before breakfast  psyllium Powder 1 Packet(s) Oral daily  sodium chloride 0.9% lock flush 5 milliLiter(s) IV Push two times a day  zinc oxide 40% Paste 1 Application(s) Topical three times a day    MEDICATIONS  (PRN):  albuterol/ipratropium for Nebulization 3 milliLiter(s) Nebulizer every 6 hours PRN Shortness of Breath and/or Wheezing  ALPRAZolam 0.25 milliGRAM(s) Oral every 6 hours PRN Anxiety  aluminum hydroxide/magnesium hydroxide/simethicone Suspension 30 milliLiter(s) Oral every 4 hours PRN Dyspepsia  benzocaine/menthol Lozenge 1 Lozenge Oral two times a day PRN Sore Throat  benzonatate 100 milliGRAM(s) Oral three times a day PRN Cough  dextrose Oral Gel 15 Gram(s) Oral once PRN Blood Glucose LESS THAN 70 milliGRAM(s)/deciliter  loperamide 2 milliGRAM(s) Oral three times a day PRN Diarrhea  polyethylene glycol 3350 17 Gram(s) Oral daily PRN Constipation  SIMETHICONE SOFTGELS 250 milliGRAM(s) 250 milliGRAM(s) Oral three times a day PRN for gas  sodium chloride 0.65% Nasal 1 Spray(s) Both Nostrils every 8 hours PRN Nasal Congestion

## 2023-11-16 LAB
ALBUMIN SERPL ELPH-MCNC: 2.7 G/DL — LOW (ref 3.3–5)
ALBUMIN SERPL ELPH-MCNC: 2.7 G/DL — LOW (ref 3.3–5)
ALP SERPL-CCNC: 272 U/L — HIGH (ref 40–120)
ALP SERPL-CCNC: 272 U/L — HIGH (ref 40–120)
ALT FLD-CCNC: 120 U/L — HIGH (ref 10–45)
ALT FLD-CCNC: 120 U/L — HIGH (ref 10–45)
ANION GAP SERPL CALC-SCNC: 6 MMOL/L — SIGNIFICANT CHANGE UP (ref 5–17)
ANION GAP SERPL CALC-SCNC: 6 MMOL/L — SIGNIFICANT CHANGE UP (ref 5–17)
AST SERPL-CCNC: 69 U/L — HIGH (ref 10–40)
AST SERPL-CCNC: 69 U/L — HIGH (ref 10–40)
BASOPHILS # BLD AUTO: 0.04 K/UL — SIGNIFICANT CHANGE UP (ref 0–0.2)
BASOPHILS # BLD AUTO: 0.04 K/UL — SIGNIFICANT CHANGE UP (ref 0–0.2)
BASOPHILS NFR BLD AUTO: 0.3 % — SIGNIFICANT CHANGE UP (ref 0–2)
BASOPHILS NFR BLD AUTO: 0.3 % — SIGNIFICANT CHANGE UP (ref 0–2)
BILIRUB SERPL-MCNC: 0.9 MG/DL — SIGNIFICANT CHANGE UP (ref 0.2–1.2)
BILIRUB SERPL-MCNC: 0.9 MG/DL — SIGNIFICANT CHANGE UP (ref 0.2–1.2)
BUN SERPL-MCNC: 19 MG/DL — SIGNIFICANT CHANGE UP (ref 7–23)
BUN SERPL-MCNC: 19 MG/DL — SIGNIFICANT CHANGE UP (ref 7–23)
CALCIUM SERPL-MCNC: 9.2 MG/DL — SIGNIFICANT CHANGE UP (ref 8.4–10.5)
CALCIUM SERPL-MCNC: 9.2 MG/DL — SIGNIFICANT CHANGE UP (ref 8.4–10.5)
CHLORIDE SERPL-SCNC: 101 MMOL/L — SIGNIFICANT CHANGE UP (ref 96–108)
CHLORIDE SERPL-SCNC: 101 MMOL/L — SIGNIFICANT CHANGE UP (ref 96–108)
CO2 SERPL-SCNC: 30 MMOL/L — SIGNIFICANT CHANGE UP (ref 22–31)
CO2 SERPL-SCNC: 30 MMOL/L — SIGNIFICANT CHANGE UP (ref 22–31)
CREAT SERPL-MCNC: 0.5 MG/DL — SIGNIFICANT CHANGE UP (ref 0.5–1.3)
CREAT SERPL-MCNC: 0.5 MG/DL — SIGNIFICANT CHANGE UP (ref 0.5–1.3)
EGFR: 105 ML/MIN/1.73M2 — SIGNIFICANT CHANGE UP
EGFR: 105 ML/MIN/1.73M2 — SIGNIFICANT CHANGE UP
EOSINOPHIL # BLD AUTO: 0 K/UL — SIGNIFICANT CHANGE UP (ref 0–0.5)
EOSINOPHIL # BLD AUTO: 0 K/UL — SIGNIFICANT CHANGE UP (ref 0–0.5)
EOSINOPHIL NFR BLD AUTO: 0 % — SIGNIFICANT CHANGE UP (ref 0–6)
EOSINOPHIL NFR BLD AUTO: 0 % — SIGNIFICANT CHANGE UP (ref 0–6)
GLUCOSE SERPL-MCNC: 103 MG/DL — HIGH (ref 70–99)
GLUCOSE SERPL-MCNC: 103 MG/DL — HIGH (ref 70–99)
HCT VFR BLD CALC: 34 % — LOW (ref 34.5–45)
HCT VFR BLD CALC: 34 % — LOW (ref 34.5–45)
HGB BLD-MCNC: 10.5 G/DL — LOW (ref 11.5–15.5)
HGB BLD-MCNC: 10.5 G/DL — LOW (ref 11.5–15.5)
IMM GRANULOCYTES NFR BLD AUTO: 1.8 % — HIGH (ref 0–0.9)
IMM GRANULOCYTES NFR BLD AUTO: 1.8 % — HIGH (ref 0–0.9)
LYMPHOCYTES # BLD AUTO: 0.97 K/UL — LOW (ref 1–3.3)
LYMPHOCYTES # BLD AUTO: 0.97 K/UL — LOW (ref 1–3.3)
LYMPHOCYTES # BLD AUTO: 6.8 % — LOW (ref 13–44)
LYMPHOCYTES # BLD AUTO: 6.8 % — LOW (ref 13–44)
MCHC RBC-ENTMCNC: 28.9 PG — SIGNIFICANT CHANGE UP (ref 27–34)
MCHC RBC-ENTMCNC: 28.9 PG — SIGNIFICANT CHANGE UP (ref 27–34)
MCHC RBC-ENTMCNC: 30.9 GM/DL — LOW (ref 32–36)
MCHC RBC-ENTMCNC: 30.9 GM/DL — LOW (ref 32–36)
MCV RBC AUTO: 93.7 FL — SIGNIFICANT CHANGE UP (ref 80–100)
MCV RBC AUTO: 93.7 FL — SIGNIFICANT CHANGE UP (ref 80–100)
MONOCYTES # BLD AUTO: 1.13 K/UL — HIGH (ref 0–0.9)
MONOCYTES # BLD AUTO: 1.13 K/UL — HIGH (ref 0–0.9)
MONOCYTES NFR BLD AUTO: 8 % — SIGNIFICANT CHANGE UP (ref 2–14)
MONOCYTES NFR BLD AUTO: 8 % — SIGNIFICANT CHANGE UP (ref 2–14)
NEUTROPHILS # BLD AUTO: 11.79 K/UL — HIGH (ref 1.8–7.4)
NEUTROPHILS # BLD AUTO: 11.79 K/UL — HIGH (ref 1.8–7.4)
NEUTROPHILS NFR BLD AUTO: 83.1 % — HIGH (ref 43–77)
NEUTROPHILS NFR BLD AUTO: 83.1 % — HIGH (ref 43–77)
NRBC # BLD: 0 /100 WBCS — SIGNIFICANT CHANGE UP (ref 0–0)
NRBC # BLD: 0 /100 WBCS — SIGNIFICANT CHANGE UP (ref 0–0)
PLATELET # BLD AUTO: 338 K/UL — SIGNIFICANT CHANGE UP (ref 150–400)
PLATELET # BLD AUTO: 338 K/UL — SIGNIFICANT CHANGE UP (ref 150–400)
POTASSIUM SERPL-MCNC: 3.5 MMOL/L — SIGNIFICANT CHANGE UP (ref 3.5–5.3)
POTASSIUM SERPL-MCNC: 3.5 MMOL/L — SIGNIFICANT CHANGE UP (ref 3.5–5.3)
POTASSIUM SERPL-SCNC: 3.5 MMOL/L — SIGNIFICANT CHANGE UP (ref 3.5–5.3)
POTASSIUM SERPL-SCNC: 3.5 MMOL/L — SIGNIFICANT CHANGE UP (ref 3.5–5.3)
PROT SERPL-MCNC: 5.5 G/DL — LOW (ref 6–8.3)
PROT SERPL-MCNC: 5.5 G/DL — LOW (ref 6–8.3)
RBC # BLD: 3.63 M/UL — LOW (ref 3.8–5.2)
RBC # BLD: 3.63 M/UL — LOW (ref 3.8–5.2)
RBC # FLD: 15.7 % — HIGH (ref 10.3–14.5)
RBC # FLD: 15.7 % — HIGH (ref 10.3–14.5)
SODIUM SERPL-SCNC: 137 MMOL/L — SIGNIFICANT CHANGE UP (ref 135–145)
SODIUM SERPL-SCNC: 137 MMOL/L — SIGNIFICANT CHANGE UP (ref 135–145)
WBC # BLD: 14.19 K/UL — HIGH (ref 3.8–10.5)
WBC # BLD: 14.19 K/UL — HIGH (ref 3.8–10.5)
WBC # FLD AUTO: 14.19 K/UL — HIGH (ref 3.8–10.5)
WBC # FLD AUTO: 14.19 K/UL — HIGH (ref 3.8–10.5)

## 2023-11-16 PROCEDURE — 99232 SBSQ HOSP IP/OBS MODERATE 35: CPT

## 2023-11-16 PROCEDURE — 99233 SBSQ HOSP IP/OBS HIGH 50: CPT

## 2023-11-16 RX ADMIN — Medication 1 MILLIGRAM(S): at 14:51

## 2023-11-16 RX ADMIN — APIXABAN 5 MILLIGRAM(S): 2.5 TABLET, FILM COATED ORAL at 20:27

## 2023-11-16 RX ADMIN — Medication 44 MILLIGRAM(S): at 08:39

## 2023-11-16 RX ADMIN — Medication 500 MILLIGRAM(S): at 14:50

## 2023-11-16 RX ADMIN — GABAPENTIN 300 MILLIGRAM(S): 400 CAPSULE ORAL at 08:39

## 2023-11-16 RX ADMIN — PANTOPRAZOLE SODIUM 40 MILLIGRAM(S): 20 TABLET, DELAYED RELEASE ORAL at 08:40

## 2023-11-16 RX ADMIN — NYSTATIN CREAM 1 APPLICATION(S): 100000 CREAM TOPICAL at 20:27

## 2023-11-16 RX ADMIN — Medication 6 MILLIGRAM(S): at 20:28

## 2023-11-16 RX ADMIN — Medication 1 TABLET(S): at 23:22

## 2023-11-16 RX ADMIN — APIXABAN 5 MILLIGRAM(S): 2.5 TABLET, FILM COATED ORAL at 08:41

## 2023-11-16 RX ADMIN — Medication 1 DROP(S): at 08:41

## 2023-11-16 RX ADMIN — SODIUM CHLORIDE 5 MILLILITER(S): 9 INJECTION INTRAMUSCULAR; INTRAVENOUS; SUBCUTANEOUS at 17:41

## 2023-11-16 RX ADMIN — ATOVAQUONE 1500 MILLIGRAM(S): 750 SUSPENSION ORAL at 14:51

## 2023-11-16 RX ADMIN — ZINC OXIDE 1 APPLICATION(S): 200 OINTMENT TOPICAL at 23:22

## 2023-11-16 RX ADMIN — Medication 1 APPLICATION(S): at 20:29

## 2023-11-16 RX ADMIN — ZINC OXIDE 1 APPLICATION(S): 200 OINTMENT TOPICAL at 08:32

## 2023-11-16 RX ADMIN — SODIUM CHLORIDE 5 MILLILITER(S): 9 INJECTION INTRAMUSCULAR; INTRAVENOUS; SUBCUTANEOUS at 08:32

## 2023-11-16 RX ADMIN — Medication 1 TABLET(S): at 14:51

## 2023-11-16 RX ADMIN — ZINC OXIDE 1 APPLICATION(S): 200 OINTMENT TOPICAL at 11:01

## 2023-11-16 RX ADMIN — Medication 1 TABLET(S): at 08:40

## 2023-11-16 RX ADMIN — Medication 1 DROP(S): at 20:28

## 2023-11-16 RX ADMIN — Medication 12.5 MILLIGRAM(S): at 20:28

## 2023-11-16 RX ADMIN — GABAPENTIN 300 MILLIGRAM(S): 400 CAPSULE ORAL at 20:27

## 2023-11-16 NOTE — PROGRESS NOTE ADULT - SUBJECTIVE AND OBJECTIVE BOX
SUBJECTIVE/ROS:  Patient seen and examined, at bed side, partner present  Reports good nigh rest, regular bowel movements  Mild pain perianal area, when defecating, due to hemorrhoids   No acute pain this time    ROS--No chest or abd pain, no palpitations nausea or vomiting  Tingling/numbness of fingers, non progressive  LBM 11/15    interval review by wound care team 11/15--showed marked improvement of rt groin wound  surface area reducing, no sloughs, mild erythema at edges, probably due to pressure from straps of kandi lift and light gate during therapy    Therapy.--Further improved as observed and reported during IDT today  Observed to have self propelled the manual WC with min assistance for increasing distance  Transfer with slide board from higher rto lower level with mod assistance  All activity performed without oxygen and BP stable    Medical--leucocytosis being monitored by Pulmonary, hospitalist teams  Overall, continued functional, and cllinical improvements, in addition to healing of Rt groin wound, which would enhance ambulatory capacity    Vital Signs Last 24 Hrs  T(C): 37 (16 Nov 2023 08:39), Max: 37.1 (15 Nov 2023 20:33)  T(F): 98.6 (16 Nov 2023 08:39), Max: 98.8 (15 Nov 2023 20:33)  HR: 80 (16 Nov 2023 08:39) (80 - 81)  BP: 116/92 (16 Nov 2023 08:39) (116/92 - 117/72)  RR: 16 (16 Nov 2023 08:39) (16 - 16)  SpO2: 94% (16 Nov 2023 08:39) (94% - 94%)  O2 Parameters below as of 16 Nov 2023 08:39  Patient On (Oxygen Delivery Method): room air      PHYSICAL EXAM:  Gen -  Comfortable,  at bedside -normal oxy sat  Pulm - clear  Cardiovascular - HS i and II intermittently irregular  Abdomen - Soft, non tender   Extremities - edema to b/l LE, no calf tenderness, LUE Med line    Neuro-  Cognitive - awake, alert, fully oriented, engaging, appropriate, happy speech normal   Motor -                    LEFT    UE - 4/5                    RIGHT UE - 4/5                     LEFT    LE - 3+/5, distally and 3/5 proximal                    RIGHT LE - Ankles PF 3/5 ,DF 2/5, Knee ext 3/5 Hips limited by pain Rt hip due to ulcer at ECHO access site   Sensory - decreased light tough sensation fingers and sole of foot, difusely  MSK: improvement with motor strength  Psychiatric - Mood stable, Affect WNL  Skin -- , stage 2 pressure injury to right buttock 4 x1 and left buttock 3x1, stage 2 pressure injury ti right inner thigh 0.2 x 0.2, right groin wound 10.5 x 2.0, Left groin wound 0.6x1.8.0.1cm    MMT--Able to contract B/L upper back muscles, EF/EE 4/5 distally      RECENT LABS/IMAGING    WBC 19, reduded to 14 on repeat test                        10.5   14.19 )-----------( 338      ( 16 Nov 2023 08:47 )             34.0     11-16    137  |  101  |  19  ----------------------------<  103<H>  3.5   |  30  |  0.50    Ca    9.2      16 Nov 2023 08:47    TPro  5.5<L>  /  Alb  2.7<L>  /  TBili  0.9  /  DBili  x   /  AST  69<H>  /  ALT  120<H>  /  AlkPhos  272<H>  11-16      Urinalysis Basic - ( 16 Nov 2023 08:47 )    Color: x / Appearance: x / SG: x / pH: x  Gluc: 103 mg/dL / Ketone: x  / Bili: x / Urobili: x   Blood: x / Protein: x / Nitrite: x   Leuk Esterase: x / RBC: x / WBC x   Sq Epi: x / Non Sq Epi: x / Bacteria: x    MEDICATIONS  (STANDING):  ammonium lactate 12% Lotion 1 Application(s) Topical every 12 hours  apixaban 5 milliGRAM(s) Oral every 12 hours  artificial  tears Solution 1 Drop(s) Both EYES every 12 hours  ascorbic acid 500 milliGRAM(s) Oral daily  atovaquone  Suspension 1500 milliGRAM(s) Oral daily  bisacodyl 5 milliGRAM(s) Oral <User Schedule>  dextrose 5%. 1000 milliLiter(s) (50 mL/Hr) IV Continuous <Continuous>  dextrose 5%. 1000 milliLiter(s) (100 mL/Hr) IV Continuous <Continuous>  dextrose 50% Injectable 25 Gram(s) IV Push once  dextrose 50% Injectable 12.5 Gram(s) IV Push once  dextrose 50% Injectable 25 Gram(s) IV Push once  folic acid 1 milliGRAM(s) Oral daily  gabapentin 300 milliGRAM(s) Oral three times a day  glucagon  Injectable 1 milliGRAM(s) IntraMuscular once  hemorrhoidal Ointment 1 Application(s) Rectal four times a day  hydrocortisone hemorrhoidal Suppository 1 Suppository(s) Rectal two times a day  lactobacillus acidophilus 1 Tablet(s) Oral two times a day with meals  melatonin 6 milliGRAM(s) Oral at bedtime  methylPREDNISolone   Oral   methylPREDNISolone 44 milliGRAM(s) Oral daily  metoprolol tartrate 12.5 milliGRAM(s) Oral two times a day  multivitamin 1 Tablet(s) Oral daily  nystatin Powder 1 Application(s) Topical two times a day  pantoprazole    Tablet 40 milliGRAM(s) Oral before breakfast  psyllium Powder 1 Packet(s) Oral daily  sodium chloride 0.9% lock flush 5 milliLiter(s) IV Push two times a day  zinc oxide 40% Paste 1 Application(s) Topical three times a day    MEDICATIONS  (PRN):  albuterol/ipratropium for Nebulization 3 milliLiter(s) Nebulizer every 6 hours PRN Shortness of Breath and/or Wheezing  ALPRAZolam 0.25 milliGRAM(s) Oral every 6 hours PRN Anxiety  aluminum hydroxide/magnesium hydroxide/simethicone Suspension 30 milliLiter(s) Oral every 4 hours PRN Dyspepsia  benzocaine/menthol Lozenge 1 Lozenge Oral two times a day PRN Sore Throat  benzonatate 100 milliGRAM(s) Oral three times a day PRN Cough  dextrose Oral Gel 15 Gram(s) Oral once PRN Blood Glucose LESS THAN 70 milliGRAM(s)/deciliter  loperamide 2 milliGRAM(s) Oral three times a day PRN Diarrhea  polyethylene glycol 3350 17 Gram(s) Oral daily PRN Constipation  SIMETHICONE SOFTGELS 250 milliGRAM(s) 250 milliGRAM(s) Oral three times a day PRN for gas  sodium chloride 0.65% Nasal 1 Spray(s) Both Nostrils every 8 hours PRN Nasal Congestion

## 2023-11-16 NOTE — PROGRESS NOTE ADULT - SUBJECTIVE AND OBJECTIVE BOX
no acute events overnight      PHYSICAL EXAM:    Vital Signs Last 24 Hrs  T(F): 98.6 (16 Nov 2023 08:39), Max: 98.8 (15 Nov 2023 20:33)  HR: 80 (16 Nov 2023 08:39) (80 - 81)  BP: 116/92 (16 Nov 2023 08:39) (116/92 - 117/72)  RR: 16 (16 Nov 2023 08:39) (16 - 16)  SpO2: 94% (16 Nov 2023 08:39) (94% - 94%)  I&O's Summary      GENERAL: NAD  HEENT: NCAT  CHEST/LUNG: No increased WOB, Clear to percussion bilaterally; No rales, rhonchi, wheezing  HEART: Regular rate and rhythm; No murmurs  ABDOMEN: Soft, Nontender, Nondistended; Bowel sounds present  MUSCULOSKELETAL/EXTREMITIES:  2+ Peripheral Pulses, No LE edema  PSYCH: Appropriate affect, Alert & Oriented    LABS:                        10.5   14.19 )-----------( 338      ( 16 Nov 2023 08:47 )             34.0       11-16    137  |  101  |  19  ----------------------------<  103  3.5   |  30  |  0.50    Ca    9.2      16 Nov 2023 08:47    TPro  5.5  /  Alb  2.7  /  TBili  0.9  /  DBili  x   /  AST  69  /  ALT  120  /  AlkPhos  272  11-16                09-25 Chol -- LDL -- HDL -- Trig 199 mg/dL                  Urinalysis Basic - ( 16 Nov 2023 08:47 )    Color: x / Appearance: x / SG: x / pH: x  Gluc: 103 mg/dL / Ketone: x  / Bili: x / Urobili: x   Blood: x / Protein: x / Nitrite: x   Leuk Esterase: x / RBC: x / WBC x   Sq Epi: x / Non Sq Epi: x / Bacteria: x            MEDICATIONS  (STANDING):  ammonium lactate 12% Lotion 1 Application(s) Topical every 12 hours  apixaban 5 milliGRAM(s) Oral every 12 hours  artificial  tears Solution 1 Drop(s) Both EYES every 12 hours  ascorbic acid 500 milliGRAM(s) Oral daily  atovaquone  Suspension 1500 milliGRAM(s) Oral daily  bisacodyl 5 milliGRAM(s) Oral <User Schedule>  dextrose 5%. 1000 milliLiter(s) (50 mL/Hr) IV Continuous <Continuous>  dextrose 5%. 1000 milliLiter(s) (100 mL/Hr) IV Continuous <Continuous>  dextrose 50% Injectable 25 Gram(s) IV Push once  dextrose 50% Injectable 12.5 Gram(s) IV Push once  dextrose 50% Injectable 25 Gram(s) IV Push once  folic acid 1 milliGRAM(s) Oral daily  gabapentin 300 milliGRAM(s) Oral three times a day  glucagon  Injectable 1 milliGRAM(s) IntraMuscular once  hemorrhoidal Ointment 1 Application(s) Rectal four times a day  hydrocortisone hemorrhoidal Suppository 1 Suppository(s) Rectal two times a day  lactobacillus acidophilus 1 Tablet(s) Oral two times a day with meals  melatonin 6 milliGRAM(s) Oral at bedtime  methylPREDNISolone   Oral   methylPREDNISolone 44 milliGRAM(s) Oral daily  metoprolol tartrate 12.5 milliGRAM(s) Oral two times a day  multivitamin 1 Tablet(s) Oral daily  nystatin Powder 1 Application(s) Topical two times a day  pantoprazole    Tablet 40 milliGRAM(s) Oral before breakfast  psyllium Powder 1 Packet(s) Oral daily  sodium chloride 0.9% lock flush 5 milliLiter(s) IV Push two times a day  zinc oxide 40% Paste 1 Application(s) Topical three times a day    MEDICATIONS  (PRN):  albuterol/ipratropium for Nebulization 3 milliLiter(s) Nebulizer every 6 hours PRN Shortness of Breath and/or Wheezing  ALPRAZolam 0.25 milliGRAM(s) Oral every 6 hours PRN Anxiety  aluminum hydroxide/magnesium hydroxide/simethicone Suspension 30 milliLiter(s) Oral every 4 hours PRN Dyspepsia  benzocaine/menthol Lozenge 1 Lozenge Oral two times a day PRN Sore Throat  benzonatate 100 milliGRAM(s) Oral three times a day PRN Cough  dextrose Oral Gel 15 Gram(s) Oral once PRN Blood Glucose LESS THAN 70 milliGRAM(s)/deciliter  loperamide 2 milliGRAM(s) Oral three times a day PRN Diarrhea  polyethylene glycol 3350 17 Gram(s) Oral daily PRN Constipation  SIMETHICONE SOFTGELS 250 milliGRAM(s) 250 milliGRAM(s) Oral three times a day PRN for gas  sodium chloride 0.65% Nasal 1 Spray(s) Both Nostrils every 8 hours PRN Nasal Congestion      Care Discussed with Consultants/Other Providers: Yes

## 2023-11-16 NOTE — PROGRESS NOTE ADULT - ASSESSMENT
Assessment/Plan:  ALIZA FOLEY is a 62 year old female with PMH of pAFIB and sciatica; who presented to [facility] ED with SOB, and was found to have groundglass opacities and interstitial lung disease. She was treated with empiric Vancomycin and Zosyn. She underwent a bronchoscopy with bronchoalveolar lavage and pulse steroids. She was given IV diuretics for increased pulmonary congestion, but her respiratory status continued to worsen.  On 9/13, she was transferred to [facility] for ECMO requirements. She was further treated with Plasmapheresis, Rituximab and high-dose steroids, with significant improvement. Her overall hospital course was complicated by thrombocytopenia (s/p several platelet transfusions), leukocytosis (infectious workup entirely negative- s/p Vanco and Ceftriaxone), AFIB with RVR (resolved with Metoprolol), dysphagia (requiring NGT), transaminitis (secondary to acute illness and hypotension),  non-occlusive RIJ DVT (started on Lovenox). Patient now admitted for a multidisciplinary rehab program. 10-05-23 @ 13:18    * Sustained functional improvement including progress with Wt bearing LE and improvement with ability at transfers  * Marked improvement in therapy today--self propelling manual WC and achieving transfers with slide board   * Wound care team review/recs appreciated--- Healing of Rt groin wound, surface area now minimal  * Continue progress with oxy tapering,     #Interstitial Lung Disease and Mixed connective tissue disease  - Gait Instability, ADL impairments and Functional impairments: start Comprehensive Rehab Program of PT/OT/SLP - 3 hours a day, 5 days a week  - P&O as needed   - Non-specific Interstitial Lung Disease  - Requiring ECMO   - Improvement s/p Plasmapheresis, Rituximab and high- dose steroids   - Albuterol/Ipratropium Nebulizer every 6 hours  - Mepron 1500mg daily  - PO Medrol ( due to liver dysfunction): Plan will be to taper by ~20% every 2 weeks    Medrol   64mg x 2 weeks   56mg x 2 weeks  44mg x 2 weeks   36mg x 2 weeks - Follow up Outpatient   - Plan to wean 02 as tolerated, Continue tapering oxygen,  -  CT Chest noted 11/10---Resp f/u review  11/14, appreciated  They reports sustained improvement, clinical (normal oxy sats on R/A, sustained progress with oxy tapering), and radiological (no acute findings on recent CT Chest, rather residual chronic infiltrative diesease noted, with recommendation to repeat CT after Acute rehab treatment     * Posttnasal drip--ENT review 11/7, declined scope, continue flonase     #pAFIB/Rt Ij thrombus  - Metoprolol 25mg BID and lovenox  --- Eliquis 5mg BID - tolerating well     # Chronic Tinnitus   - ENT review and recs appreciate, Patient already made ENT appointment for f/u    # Lymphedema both legs -chronic and Rt IJ thrombus  - ACE Wrap BL LEs  - BL LE doppler negative for DVT  - BL UE doppler with chronic thrombotic changes in RIJ     #Transaminitis --LFT Down trending   - Secondary to acute illness and hypotension at [facility]  - Outpatient follow up     #Neuropathy  - Gabapentin 300 mg BID, will consider increasing dose, increase to TID 11/10  --Work on motor strengthening, priority for UE as preferred by patient   - Vit b12 >2,000 in 9/2023  --Will consider Rhuematology f/u if needed    #Sleep/Mood  - Melatonin 6mg at HS  - Psych rec Xanax 0.25mg PRN    #Skin  - Skin: Left lower abdomen ruptured blister 3.0 x 1.0, stage 2 pressure injury to right buttock 4 x1 and left buttock 3x1, stage 2 pressure injury ti right inner thigh 0.2 x 0.2, right groin wound 10.5 x 2.0,   Wound care review, Left groin wound 11/15--- 0.6x1.8.0.1cm, no slough, healthy base  -- MAD to skin folds- Nystatin to submammary and abdominal pannus BID  -- MAD to L groin- cleanse with NS, Triad cream daily  -- Mad to R groin- Nystatin powder daily   -- R groin wound-- aquacel packing, Triad to periwound, Allevyn foam- daily and PRN   - Pressure injury/Skin: OOB to Chair, PT/OT   - Offload pressure, low air loss support surfaces, T&P every 2 hours   --Wound care consult-10/9 -Multiple wounds--perineal and buttock/sacral, recs appreciated   --Suggest Continue treatments as below:   Twice daily & PRN Normal Saline Cleanse of abdominal pannus & breast folds, perineal, Left & Right inner buttock erosions.  Pat thoroughly dry.   Apply Desitin generously twice daily (& PRN) to perineal, buttocks, and left groin erosions, leave open to air.    Apply Nystatin powder to abdominal pannus and breast folds.  Suggest use of Interdry cloths in folds to 'wick' moisture.  Suggest daily Normal Saline Cleanse of right groin surgical wound, pat dry.  Apply Cavillon film barrier to intact periwound skin.  Place Aquacell strip over open area, cover with foam dressing.  - Wound care following     #Pain Mgmt   - Tylenol PRN   - Gabapentin 300mg at HS     #GI/Bowel Mgmt   - Pantoprazole  - Senna  --on hold due to recent Loose BM  - PRN: Maalox   - Stool count  --Lactobacillus BID   -- AB XR mod stool burden on transverse colon 10/23--still has mod stool burden, but moving bowel appropriately  - S/p enema and lactulose    # Alternating bowel function --continue probiotic, lactobacillus   * Leucocytosis probably steroid related and partly due to her Mixed Connective Tissue Disease, will continue monitoring     #/Bladder Mgmt   -Spontaneously voiding   - UA (11/3) negative    #FEN   #Dysphagia  - Diet - Minced & Moist  [CC]    - Dysphaiga- SLP evaluation     #Health maintenance  - Folic Acid   - MVI  - Ocean nasal spray    # Difficulty with access to peripheral veins-- Blood draws from Left arm Medline     #Precautions / PROPHYLAXIS:   - Falls  - ortho: Weight bearing status: WBAT   - Lungs: Aspiration, Incentive Spirometer   - DVT PPX: Eliquis 5 mg BID     11/13 --Labs CBC mild anemia, normal renal function, LFT elevated, non progressive     IDT conference on 11/14  Social Work: Await peer to peer with CM. Provided private hire list. Lives with spouse in [place] 6 months of year. DC to apt in [place] with elevator, no steps. Supportive spouse.     Function--Supervision for eating and grooming upper body, mod assistance with lower body dressing, max assist with toileting, but patient making sustained effort engaging  Improving motor strength at ankles and knees.   Transfers and ambulation--marked progress  Able to self propell her manual wheel chair with minimum assistance, increasing distance, Able to transfer from higher to lower level, max assistance with slide board  Ambulating in light gate max assist x 2 up to 40 ft  Barriers--need for max assistance for transfers  DME--Host bed, Damaris lift, WC  Est d 11/27--sustained motor improvement of upper extremity function, recently making progress with LE motor function progressing from distal to proxima  (Overall improvement--clinical (healing of Rt groin wound, no new findings on Chest CT as noted by pulmonary team. Functional--improvement with transfers, ambulation and LE strength)  Medical and pulmonary teams monitoring leucocytosis and oxy tapering  Est dc revised to 11/27    OUTPATIENT/FOLLOW UP:    Trae Whitney  Pulmonary Disease  [address], [address] [place] [address]  Phone: [phone]  Fax: [phone]  Follow Up Time: 2 weeks    Ryanne Allen  Rheumatology  [address] [address]  Phone: [phone]  Fax: [phone]  Follow Up Time: 1 week    Kyle Pierce  Internal Medicine  [address] [place] [address], [address]  [place] [address]  Phone: [phone]  Fax: [phone]  Follow Up Time: 2 weeks    Addy Powers  Pulmonary Disease  [address] [facility], [address]  [place], [place] [id number]  Phone: [phone]  Fax: [phone]  Follow Up Time:     Kwame Hawley  Critical Care Medicine  [address] [facility], [address]  [place] [address]  Phone: [phone]  Fax: [phone]  Follow Up Time:     Neena Borrego  Rheumatology  [address] [place] [address], [address]  [place] [address]  Phone: [phone]  Fax: [phone]  Follow Up Time:    [facility]  [facility] [address] [facility]  Scheduled Appointment: 09/13/2023  2:40 PM    Non critical care  Time spent 64 mins patient review, discussion with family on update, IDT, observation in therapy, care co ordination

## 2023-11-16 NOTE — PROGRESS NOTE ADULT - ASSESSMENT
62F with AF, hx sciatica, newly diagnosed ILD (likely associated with MCTD +ARIANA, +RNP, +Sm) with exacerbation requiring ECMO / plasmapharesis / Rituximab / steriods, now at acute rehab    #Transaminitis  -stable    #chronic macrocytic anemia  - H/H remains stable  - on MVI, folate and thiamine    #hyponatremia, improved    #leukocytosis due to steroid use, improved.    #Interstitial Lung Disease  #Mixed connective tissue disease  #Suspect critical illness myopathy from prolonged ICU stay, resolved  - CT 11/10 new predominant peripheral reticular opacities, within lung paranchyma; no significant bronchiectasis or honeycombing.   -c/w steroids - taper as scheduled (2 week intervals)   -c/w nebs  -c/w PT/OT  -Mepron for PCP ppx while on steroids     #PAF  #Non-occlusive right IJ thrombus   -c/w Eliquis  -c/w lopressor 12.5mg po BID  - goal HR for pAfib is <110    #Anxiety  -appreciate psych follow-up  -c/w Xanax PRN      #DVT ppx: Eliquis  labs reviewed  discussed with primary team.

## 2023-11-17 PROCEDURE — 99232 SBSQ HOSP IP/OBS MODERATE 35: CPT

## 2023-11-17 RX ADMIN — GABAPENTIN 300 MILLIGRAM(S): 400 CAPSULE ORAL at 20:16

## 2023-11-17 RX ADMIN — Medication 6 MILLIGRAM(S): at 20:17

## 2023-11-17 RX ADMIN — PANTOPRAZOLE SODIUM 40 MILLIGRAM(S): 20 TABLET, DELAYED RELEASE ORAL at 08:35

## 2023-11-17 RX ADMIN — NYSTATIN CREAM 1 APPLICATION(S): 100000 CREAM TOPICAL at 09:04

## 2023-11-17 RX ADMIN — Medication 1 TABLET(S): at 14:43

## 2023-11-17 RX ADMIN — Medication 1 MILLIGRAM(S): at 14:43

## 2023-11-17 RX ADMIN — Medication 1 APPLICATION(S): at 20:17

## 2023-11-17 RX ADMIN — Medication 1 DROP(S): at 20:18

## 2023-11-17 RX ADMIN — Medication 1 DROP(S): at 08:41

## 2023-11-17 RX ADMIN — SODIUM CHLORIDE 5 MILLILITER(S): 9 INJECTION INTRAMUSCULAR; INTRAVENOUS; SUBCUTANEOUS at 08:42

## 2023-11-17 RX ADMIN — NYSTATIN CREAM 1 APPLICATION(S): 100000 CREAM TOPICAL at 20:18

## 2023-11-17 RX ADMIN — Medication 12.5 MILLIGRAM(S): at 08:35

## 2023-11-17 RX ADMIN — APIXABAN 5 MILLIGRAM(S): 2.5 TABLET, FILM COATED ORAL at 09:14

## 2023-11-17 RX ADMIN — ATOVAQUONE 1500 MILLIGRAM(S): 750 SUSPENSION ORAL at 14:43

## 2023-11-17 RX ADMIN — Medication 12.5 MILLIGRAM(S): at 20:17

## 2023-11-17 RX ADMIN — GABAPENTIN 300 MILLIGRAM(S): 400 CAPSULE ORAL at 08:34

## 2023-11-17 RX ADMIN — APIXABAN 5 MILLIGRAM(S): 2.5 TABLET, FILM COATED ORAL at 20:16

## 2023-11-17 RX ADMIN — Medication 1 TABLET(S): at 08:35

## 2023-11-17 RX ADMIN — Medication 1 TABLET(S): at 17:27

## 2023-11-17 RX ADMIN — GABAPENTIN 300 MILLIGRAM(S): 400 CAPSULE ORAL at 14:43

## 2023-11-17 RX ADMIN — SODIUM CHLORIDE 5 MILLILITER(S): 9 INJECTION INTRAMUSCULAR; INTRAVENOUS; SUBCUTANEOUS at 17:28

## 2023-11-17 RX ADMIN — ZINC OXIDE 1 APPLICATION(S): 200 OINTMENT TOPICAL at 09:05

## 2023-11-17 RX ADMIN — ZINC OXIDE 1 APPLICATION(S): 200 OINTMENT TOPICAL at 14:49

## 2023-11-17 RX ADMIN — ZINC OXIDE 1 APPLICATION(S): 200 OINTMENT TOPICAL at 20:17

## 2023-11-17 RX ADMIN — Medication 1 APPLICATION(S): at 08:38

## 2023-11-17 RX ADMIN — Medication 500 MILLIGRAM(S): at 14:43

## 2023-11-17 RX ADMIN — Medication 44 MILLIGRAM(S): at 08:35

## 2023-11-17 NOTE — PROGRESS NOTE ADULT - SUBJECTIVE AND OBJECTIVE BOX
SUBJECTIVE/ROS:  Patient seen and examined, at bed side, partner present  Reports good nigh rest, regular bowel movements  Mild pain perianal area, when defecating, due to hemorrhoids   No acute pain this time    ROS--No chest or abd pain, no palpitations nausea or vomiting  Tingling/numbness of fingers, non progressive  LBM 11/15    interval review by wound care team 11/15--showed marked improvement of rt groin wound  surface area reducing, no sloughs, mild erythema at edges, probably due to pressure from straps of kandi lift and light gate during therapy    Therapy.--Further improved as observed and reported during IDT today  Observed to have self propelled the manual WC with min assistance for increasing distance  Transfer with slide board from higher rto lower level with mod assistance  All activity performed without oxygen and BP stable    Medical--leucocytosis being monitored by Pulmonary, hospitalist teams  Overall, continued functional, and cllinical improvements, in addition to healing of Rt groin wound, which would enhance ambulatory capacity     Vital Signs Last 24 Hrs  T(C): 36.9 (17 Nov 2023 08:45), Max: 37 (16 Nov 2023 23:05)  T(F): 98.4 (17 Nov 2023 08:45), Max: 98.6 (16 Nov 2023 23:05)  HR: 86 (17 Nov 2023 08:45) (86 - 87)  BP: 114/74 (17 Nov 2023 08:45) (111/74 - 114/74)  RR: 16 (17 Nov 2023 08:45) (16 - 16)  SpO2: 95% (17 Nov 2023 08:45) (91% - 95%)    O2 Parameters below as of 17 Nov 2023 08:45  Patient On (Oxygen Delivery Method): room air            PHYSICAL EXAM:  Gen -  Comfortable,  at bedside -normal oxy sat  Pulm - clear  Cardiovascular - HS i and II intermittently irregular  Abdomen - Soft, non tender   Extremities - edema to b/l LE, no calf tenderness, LUE Med line    Neuro-  Cognitive - awake, alert, fully oriented, engaging, appropriate, happy speech normal   Motor -                    LEFT    UE - 4/5                    RIGHT UE - 4/5                     LEFT    LE - 3+/5, distally and 3/5 proximal                    RIGHT LE - Ankles PF 3/5 ,DF 2/5, Knee ext 3/5 Hips limited by pain Rt hip due to ulcer at ECHO access site   Sensory - decreased light tough sensation fingers and sole of foot, difusely  MSK: improvement with motor strength  Psychiatric - Mood stable, Affect WNL  Skin -- , stage 2 pressure injury to right buttock 4 x1 and left buttock 3x1, stage 2 pressure injury ti right inner thigh 0.2 x 0.2, right groin wound 10.5 x 2.0, Left groin wound 0.6x1.8.0.1cm    MMT--Able to contract B/L upper back muscles, EF/EE 4/5 distally      RECENT LABS/IMAGING    WBC 19, reduded to 14 on repeat test                        10.5   14.19 )-----------( 338      ( 16 Nov 2023 08:47 )             34.0     11-16    137  |  101  |  19  ----------------------------<  103<H>  3.5   |  30  |  0.50    Ca    9.2      16 Nov 2023 08:47    TPro  5.5<L>  /  Alb  2.7<L>  /  TBili  0.9  /  DBili  x   /  AST  69<H>  /  ALT  120<H>  /  AlkPhos  272<H>  11-16      Urinalysis Basic - ( 16 Nov 2023 08:47 )    Color: x / Appearance: x / SG: x / pH: x  Gluc: 103 mg/dL / Ketone: x  / Bili: x / Urobili: x   Blood: x / Protein: x / Nitrite: x   Leuk Esterase: x / RBC: x / WBC x   Sq Epi: x / Non Sq Epi: x / Bacteria: x    MEDICATIONS  (STANDING):  ammonium lactate 12% Lotion 1 Application(s) Topical every 12 hours  apixaban 5 milliGRAM(s) Oral every 12 hours  artificial  tears Solution 1 Drop(s) Both EYES every 12 hours  ascorbic acid 500 milliGRAM(s) Oral daily  atovaquone  Suspension 1500 milliGRAM(s) Oral daily  bisacodyl 5 milliGRAM(s) Oral <User Schedule>  dextrose 5%. 1000 milliLiter(s) (50 mL/Hr) IV Continuous <Continuous>  dextrose 5%. 1000 milliLiter(s) (100 mL/Hr) IV Continuous <Continuous>  dextrose 50% Injectable 25 Gram(s) IV Push once  dextrose 50% Injectable 12.5 Gram(s) IV Push once  dextrose 50% Injectable 25 Gram(s) IV Push once  folic acid 1 milliGRAM(s) Oral daily  gabapentin 300 milliGRAM(s) Oral three times a day  glucagon  Injectable 1 milliGRAM(s) IntraMuscular once  hydrocortisone hemorrhoidal Suppository 1 Suppository(s) Rectal two times a day  lactobacillus acidophilus 1 Tablet(s) Oral two times a day with meals  melatonin 6 milliGRAM(s) Oral at bedtime  methylPREDNISolone 44 milliGRAM(s) Oral every 24 hours  methylPREDNISolone   Oral   metoprolol tartrate 12.5 milliGRAM(s) Oral two times a day  multivitamin 1 Tablet(s) Oral daily  nystatin Powder 1 Application(s) Topical two times a day  pantoprazole    Tablet 40 milliGRAM(s) Oral before breakfast  psyllium Powder 1 Packet(s) Oral daily  sodium chloride 0.9% lock flush 5 milliLiter(s) IV Push two times a day  zinc oxide 40% Paste 1 Application(s) Topical three times a day    MEDICATIONS  (PRN):  albuterol/ipratropium for Nebulization 3 milliLiter(s) Nebulizer every 6 hours PRN Shortness of Breath and/or Wheezing  ALPRAZolam 0.25 milliGRAM(s) Oral every 6 hours PRN Anxiety  aluminum hydroxide/magnesium hydroxide/simethicone Suspension 30 milliLiter(s) Oral every 4 hours PRN Dyspepsia  benzocaine/menthol Lozenge 1 Lozenge Oral two times a day PRN Sore Throat  benzonatate 100 milliGRAM(s) Oral three times a day PRN Cough  dextrose Oral Gel 15 Gram(s) Oral once PRN Blood Glucose LESS THAN 70 milliGRAM(s)/deciliter  loperamide 2 milliGRAM(s) Oral three times a day PRN Diarrhea  polyethylene glycol 3350 17 Gram(s) Oral daily PRN Constipation  SIMETHICONE SOFTGELS 250 milliGRAM(s) 250 milliGRAM(s) Oral three times a day PRN for gas  sodium chloride 0.65% Nasal 1 Spray(s) Both Nostrils every 8 hours PRN Nasal Congestion      SUBJECTIVE/ROS:  Patient seen and examined, at bed side, partner present  Reports uneventful night  Reports intermittent numbness right lower jaw which has been present since   Mild pain perianal area, when defecating, due to hemorrhoids   No acute pain this time    ROS--No chest or abd pain, no palpitations nausea or vomiting  Tingling/numbness of fingers, non progressive  LBM 11/17    Therapy.--  Motivated, will continue working on proximal LE muscle stengthening   Exercise on wt bearing with light gate as scheduled by therapists  Transfer with slide board from higher rto lower level with mod assistance  All activity performed without oxygen and BP stable    Vital Signs Last 24 Hrs  T(C): 36.9 (17 Nov 2023 08:45), Max: 37 (16 Nov 2023 23:05)  T(F): 98.4 (17 Nov 2023 08:45), Max: 98.6 (16 Nov 2023 23:05)  HR: 86 (17 Nov 2023 08:45) (86 - 87)  BP: 114/74 (17 Nov 2023 08:45) (111/74 - 114/74)  RR: 16 (17 Nov 2023 08:45) (16 - 16)  SpO2: 95% (17 Nov 2023 08:45) (91% - 95%)  O2 Parameters below as of 17 Nov 2023 08:45  Patient On (Oxygen Delivery Method): room air      PHYSICAL EXAM:  Gen -  Comfortable,  at bedside -normal oxy sat  Pulm - clear  Cardiovascular - HS i and II intermittently irregular  Abdomen - Soft, non tender   Extremities - edema to b/l LE, no calf tenderness, LUE Med line    Neuro-  Cognitive - awake, alert, fully oriented, engaging, appropriate,   Light tough sensation diffusely reduced on fingers and dorsal foot, and over right lower jaw, localized area approx 2 cm, no skin changes noted  Motor -                    LEFT    UE - 4/5                    RIGHT UE - 4/5                     LEFT    LE - 3+/5, distally and 3/5 proximal                    RIGHT LE - Ankles PF 3/5 ,DF 2/5, Knee ext 3/5 Hips limited by pain Rt hip due to ulcer at ECHO access site   Sensory - decreased light tough sensation fingers and sole of foot, difusely  MSK: improvement with motor strength  Psychiatric - Mood stable, Affect WNL  Skin -- , stage 2 pressure injury to right buttock 4 x1 and left buttock 3x1, stage 2 pressure injury ti right inner thigh 0.2 x 0.2, right groin wound 10.5 x 2.0, Left groin wound 0.6x1.8.0.1cm    MMT--Able to contract B/L upper back muscles, EF/EE 4/5 distally      RECENT LABS/IMAGING                        10.5   14.19 )-----------( 338      ( 16 Nov 2023 08:47 )             34.0     11-16    137  |  101  |  19  ----------------------------<  103<H>  3.5   |  30  |  0.50    Ca    9.2      16 Nov 2023 08:47    TPro  5.5<L>  /  Alb  2.7<L>  /  TBili  0.9  /  DBili  x   /  AST  69<H>  /  ALT  120<H>  /  AlkPhos  272<H>  11-16      Urinalysis Basic - ( 16 Nov 2023 08:47 )    Color: x / Appearance: x / SG: x / pH: x  Gluc: 103 mg/dL / Ketone: x  / Bili: x / Urobili: x   Blood: x / Protein: x / Nitrite: x   Leuk Esterase: x / RBC: x / WBC x   Sq Epi: x / Non Sq Epi: x / Bacteria: x    MEDICATIONS  (STANDING):  ammonium lactate 12% Lotion 1 Application(s) Topical every 12 hours  apixaban 5 milliGRAM(s) Oral every 12 hours  artificial  tears Solution 1 Drop(s) Both EYES every 12 hours  ascorbic acid 500 milliGRAM(s) Oral daily  atovaquone  Suspension 1500 milliGRAM(s) Oral daily  bisacodyl 5 milliGRAM(s) Oral <User Schedule>  dextrose 5%. 1000 milliLiter(s) (50 mL/Hr) IV Continuous <Continuous>  dextrose 5%. 1000 milliLiter(s) (100 mL/Hr) IV Continuous <Continuous>  dextrose 50% Injectable 25 Gram(s) IV Push once  dextrose 50% Injectable 12.5 Gram(s) IV Push once  dextrose 50% Injectable 25 Gram(s) IV Push once  folic acid 1 milliGRAM(s) Oral daily  gabapentin 300 milliGRAM(s) Oral three times a day  glucagon  Injectable 1 milliGRAM(s) IntraMuscular once  hydrocortisone hemorrhoidal Suppository 1 Suppository(s) Rectal two times a day  lactobacillus acidophilus 1 Tablet(s) Oral two times a day with meals  melatonin 6 milliGRAM(s) Oral at bedtime  methylPREDNISolone 44 milliGRAM(s) Oral every 24 hours  methylPREDNISolone   Oral   metoprolol tartrate 12.5 milliGRAM(s) Oral two times a day  multivitamin 1 Tablet(s) Oral daily  nystatin Powder 1 Application(s) Topical two times a day  pantoprazole    Tablet 40 milliGRAM(s) Oral before breakfast  psyllium Powder 1 Packet(s) Oral daily  sodium chloride 0.9% lock flush 5 milliLiter(s) IV Push two times a day  zinc oxide 40% Paste 1 Application(s) Topical three times a day    MEDICATIONS  (PRN):  albuterol/ipratropium for Nebulization 3 milliLiter(s) Nebulizer every 6 hours PRN Shortness of Breath and/or Wheezing  ALPRAZolam 0.25 milliGRAM(s) Oral every 6 hours PRN Anxiety  aluminum hydroxide/magnesium hydroxide/simethicone Suspension 30 milliLiter(s) Oral every 4 hours PRN Dyspepsia  benzocaine/menthol Lozenge 1 Lozenge Oral two times a day PRN Sore Throat  benzonatate 100 milliGRAM(s) Oral three times a day PRN Cough  dextrose Oral Gel 15 Gram(s) Oral once PRN Blood Glucose LESS THAN 70 milliGRAM(s)/deciliter  loperamide 2 milliGRAM(s) Oral three times a day PRN Diarrhea  polyethylene glycol 3350 17 Gram(s) Oral daily PRN Constipation  SIMETHICONE SOFTGELS 250 milliGRAM(s) 250 milliGRAM(s) Oral three times a day PRN for gas  sodium chloride 0.65% Nasal 1 Spray(s) Both Nostrils every 8 hours PRN Nasal Congestion

## 2023-11-17 NOTE — PROGRESS NOTE ADULT - ASSESSMENT
62F with AF, hx sciatica, newly diagnosed ILD (likely associated with MCTD +ARIANA, +RNP, +Sm) with exacerbation requiring ECMO / plasmapharesis / Rituximab / steriods, now at acute rehab    #Transaminitis  -stable    #chronic macrocytic anemia  - H/H remains stable  - on MVI, folate and thiamine    #hyponatremia, improved    #leukocytosis due to steroid use, improved.    #Interstitial Lung Disease  #Mixed connective tissue disease  #Suspect critical illness myopathy from prolonged ICU stay, resolved  - CT 11/10 new predominant peripheral reticular opacities, within lung paranchyma; no significant bronchiectasis or honeycombing.   -c/w steroids - taper as scheduled (2 week intervals)   -c/w nebs  -c/w PT/OT  -Mepron for PCP ppx while on steroids     #PAF  #Non-occlusive right IJ thrombus   -c/w Eliquis  -c/w lopressor 12.5mg po BID  - goal HR for pAfib is <110    #Anxiety  -appreciate psych follow-up  -c/w Xanax PRN      #DVT ppx: Eliquis  labs reviewed as of 11/16  discussed with primary team.

## 2023-11-17 NOTE — PROGRESS NOTE ADULT - ASSESSMENT
Assessment/Plan:  ALIZA FOLEY is a 62 year old female with PMH of pAFIB and sciatica; who presented to [facility] ED with SOB, and was found to have groundglass opacities and interstitial lung disease. She was treated with empiric Vancomycin and Zosyn. She underwent a bronchoscopy with bronchoalveolar lavage and pulse steroids. She was given IV diuretics for increased pulmonary congestion, but her respiratory status continued to worsen.  On 9/13, she was transferred to [facility] for ECMO requirements. She was further treated with Plasmapheresis, Rituximab and high-dose steroids, with significant improvement. Her overall hospital course was complicated by thrombocytopenia (s/p several platelet transfusions), leukocytosis (infectious workup entirely negative- s/p Vanco and Ceftriaxone), AFIB with RVR (resolved with Metoprolol), dysphagia (requiring NGT), transaminitis (secondary to acute illness and hypotension),  non-occlusive RIJ DVT (started on Lovenox). Patient now admitted for a multidisciplinary rehab program. 10-05-23 @ 13:18    * Sustained functional improvement including progress with Wt bearing LE and improvement with ability at transfers  * Marked improvement in therapy today--self propelling manual WC and achieving transfers with slide board   * Wound care team review/recs appreciated--- Healing of Rt groin wound, surface area now minimal  * Continue progress with oxy tapering,     #Interstitial Lung Disease and Mixed connective tissue disease  - Gait Instability, ADL impairments and Functional impairments: start Comprehensive Rehab Program of PT/OT/SLP - 3 hours a day, 5 days a week  - P&O as needed   - Non-specific Interstitial Lung Disease  - Requiring ECMO   - Improvement s/p Plasmapheresis, Rituximab and high- dose steroids   - Albuterol/Ipratropium Nebulizer every 6 hours  - Mepron 1500mg daily  - PO Medrol ( due to liver dysfunction): Plan will be to taper by ~20% every 2 weeks    Medrol   64mg x 2 weeks   56mg x 2 weeks  44mg x 2 weeks   36mg x 2 weeks - Follow up Outpatient   - Plan to wean 02 as tolerated, Continue tapering oxygen,  -  CT Chest noted 11/10---Resp f/u review  11/14, appreciated  They reports sustained improvement, clinical (normal oxy sats on R/A, sustained progress with oxy tapering), and radiological (no acute findings on recent CT Chest, rather residual chronic infiltrative diesease noted, with recommendation to repeat CT after Acute rehab treatment     * Posttnasal drip--ENT review 11/7, declined scope, continue flonase     #pAFIB/Rt Ij thrombus  - Metoprolol 25mg BID and lovenox  --- Eliquis 5mg BID - tolerating well     # Chronic Tinnitus   - ENT review and recs appreciate, Patient already made ENT appointment for f/u    # Lymphedema both legs -chronic and Rt IJ thrombus  - ACE Wrap BL LEs  - BL LE doppler negative for DVT  - BL UE doppler with chronic thrombotic changes in RIJ     #Transaminitis --LFT Down trending   - Secondary to acute illness and hypotension at [facility]  - Outpatient follow up     #Neuropathy  - Gabapentin 300 mg BID, will consider increasing dose, increase to TID 11/10  --Work on motor strengthening, priority for UE as preferred by patient   - Vit b12 >2,000 in 9/2023  --Will consider Rhuematology f/u if needed    #Sleep/Mood  - Melatonin 6mg at HS  - Psych rec Xanax 0.25mg PRN    #Skin  - Skin: Left lower abdomen ruptured blister 3.0 x 1.0, stage 2 pressure injury to right buttock 4 x1 and left buttock 3x1, stage 2 pressure injury ti right inner thigh 0.2 x 0.2, right groin wound 10.5 x 2.0,   Wound care review, Left groin wound 11/15--- 0.6x1.8.0.1cm, no slough, healthy base  -- MAD to skin folds- Nystatin to submammary and abdominal pannus BID  -- MAD to L groin- cleanse with NS, Triad cream daily  -- Mad to R groin- Nystatin powder daily   -- R groin wound-- aquacel packing, Triad to periwound, Allevyn foam- daily and PRN   - Pressure injury/Skin: OOB to Chair, PT/OT   - Offload pressure, low air loss support surfaces, T&P every 2 hours   --Wound care consult-10/9 -Multiple wounds--perineal and buttock/sacral, recs appreciated   --Suggest Continue treatments as below:   Twice daily & PRN Normal Saline Cleanse of abdominal pannus & breast folds, perineal, Left & Right inner buttock erosions.  Pat thoroughly dry.   Apply Desitin generously twice daily (& PRN) to perineal, buttocks, and left groin erosions, leave open to air.    Apply Nystatin powder to abdominal pannus and breast folds.  Suggest use of Interdry cloths in folds to 'wick' moisture.  Suggest daily Normal Saline Cleanse of right groin surgical wound, pat dry.  Apply Cavillon film barrier to intact periwound skin.  Place Aquacell strip over open area, cover with foam dressing.  - Wound care following     #Pain Mgmt   - Tylenol PRN   - Gabapentin 300mg at HS     #GI/Bowel Mgmt   - Pantoprazole  - Senna  --on hold due to recent Loose BM  - PRN: Maalox   - Stool count  --Lactobacillus BID   -- AB XR mod stool burden on transverse colon 10/23--still has mod stool burden, but moving bowel appropriately  - S/p enema and lactulose    # Alternating bowel function --continue probiotic, lactobacillus   * Leucocytosis probably steroid related and partly due to her Mixed Connective Tissue Disease, will continue monitoring     #/Bladder Mgmt   -Spontaneously voiding   - UA (11/3) negative    #FEN   #Dysphagia  - Diet - Minced & Moist  [CC]    - Dysphaiga- SLP evaluation     #Health maintenance  - Folic Acid   - MVI  - Ocean nasal spray    # Difficulty with access to peripheral veins-- Blood draws from Left arm Medline     #Precautions / PROPHYLAXIS:   - Falls  - ortho: Weight bearing status: WBAT   - Lungs: Aspiration, Incentive Spirometer   - DVT PPX: Eliquis 5 mg BID     11/13 --Labs CBC mild anemia, normal renal function, LFT elevated, non progressive     IDT conference on 11/14  Social Work: Await peer to peer with CM. Provided private hire list. Lives with spouse in [place] 6 months of year. DC to apt in [place] with elevator, no steps. Supportive spouse.     Function--Supervision for eating and grooming upper body, mod assistance with lower body dressing, max assist with toileting, but patient making sustained effort engaging  Improving motor strength at ankles and knees.   Transfers and ambulation--marked progress  Able to self propell her manual wheel chair with minimum assistance, increasing distance, Able to transfer from higher to lower level, max assistance with slide board  Ambulating in light gate max assist x 2 up to 40 ft  Barriers--need for max assistance for transfers  DME--Host bed, Damaris lift, WC  Est d 11/27--sustained motor improvement of upper extremity function, recently making progress with LE motor function progressing from distal to proxima  (Overall improvement--clinical (healing of Rt groin wound, no new findings on Chest CT as noted by pulmonary team. Functional--improvement with transfers, ambulation and LE strength)  Medical and pulmonary teams monitoring leucocytosis and oxy tapering  Est dc revised to 11/27    OUTPATIENT/FOLLOW UP:    Trae Whitney  Pulmonary Disease  [address], [address] [place] [address]  Phone: [phone]  Fax: [phone]  Follow Up Time: 2 weeks    Ryanne Allen  Rheumatology  [address] [address]  Phone: [phone]  Fax: [phone]  Follow Up Time: 1 week    Kyle Pierce  Internal Medicine  [address] [place] [address], [address]  [place] [address]  Phone: [phone]  Fax: [phone]  Follow Up Time: 2 weeks    Addy Powers  Pulmonary Disease  [address] [facility], [address]  [place], [place] [address]  Phone: [phone]  Fax: [phone]  Follow Up Time:     Kwame Hawley  Critical Care Medicine  [address] [facility], [address]  [place] [address]  Phone: [phone]  Fax: [phone]  Follow Up Time:     Neena Borrego  Rheumatology  [address] [place] [address], [address]  [place] [address]  Phone: [phone]  Fax: [phone]  Follow Up Time:    [facility]  [facility] [address] [facility]  Scheduled Appointment: 09/13/2023  2:40 PM    Non critical care  Time spent 64 mins patient review, discussion with family on update, IDT, observation in therapy, care co ordination     Assessment/Plan:  ALIZA FOLEY is a 62 year old female with PMH of pAFIB and sciatica; who presented to [facility] ED with SOB, and was found to have groundglass opacities and interstitial lung disease. She was treated with empiric Vancomycin and Zosyn. She underwent a bronchoscopy with bronchoalveolar lavage and pulse steroids. She was given IV diuretics for increased pulmonary congestion, but her respiratory status continued to worsen.  On 9/13, she was transferred to [facility] for ECMO requirements. She was further treated with Plasmapheresis, Rituximab and high-dose steroids, with significant improvement. Her overall hospital course was complicated by thrombocytopenia (s/p several platelet transfusions), leukocytosis (infectious workup entirely negative- s/p Vanco and Ceftriaxone), AFIB with RVR (resolved with Metoprolol), dysphagia (requiring NGT), transaminitis (secondary to acute illness and hypotension),  non-occlusive RIJ DVT (started on Lovenox). Patient now admitted for a multidisciplinary rehab program. 10-05-23 @ 13:18    * Monitor for any progression of numbness/tinging of extremites--probably related to her connective tissue disease/neuropathy  * Sustained functional improvement including progress with Wt bearing LE and improvement with ability at transfers  * Marked improvement in therapy today--self propelling manual WC and achieving transfers with slide board   * Continue progress with oxy tapering, and tapering steroid regimen     #Interstitial Lung Disease and Mixed connective tissue disease  - Gait Instability, ADL impairments and Functional impairments: start Comprehensive Rehab Program of PT/OT/SLP - 3 hours a day, 5 days a week  - P&O as needed   - Non-specific Interstitial Lung Disease  - Requiring ECMO   - Improvement s/p Plasmapheresis, Rituximab and high- dose steroids   - Albuterol/Ipratropium Nebulizer every 6 hours  - Mepron 1500mg daily  - PO Medrol ( due to liver dysfunction): Plan will be to taper by ~20% every 2 weeks    Medrol   64mg x 2 weeks   56mg x 2 weeks  44mg x 2 weeks   36mg x 2 weeks - Follow up Outpatient   - Plan to wean 02 as tolerated, Continue tapering oxygen,  -  CT Chest noted 11/10---Resp f/u review  11/14, appreciated  They reports sustained improvement, clinical (normal oxy sats on R/A, sustained progress with oxy tapering), and radiological (no acute findings on recent CT Chest, rather residual chronic infiltrative diesease noted, with recommendation to repeat CT after Acute rehab treatment     * Posttnasal drip--ENT review 11/7, declined scope, continue flonase     #pAFIB/Rt Ij thrombus  - Metoprolol 25mg BID and lovenox  --- Eliquis 5mg BID - tolerating well     # Chronic Tinnitus   - ENT review and recs appreciate, Patient already made ENT appointment for f/u    # Lymphedema both legs -chronic and Rt IJ thrombus  - ACE Wrap BL LEs  - BL LE doppler negative for DVT  - BL UE doppler with chronic thrombotic changes in RIJ     #Transaminitis --LFT Down trending   - Secondary to acute illness and hypotension at [facility]  - Outpatient follow up     * Leucocytosis--steroid related, continue monitoring   #Neuropathy  - Gabapentin 300 mg BID, will consider increasing dose, increase to TID 11/10  --Work on motor strengthening, priority for UE as preferred by patient   - Vit b12 >2,000 in 9/2023  --Will consider Rhuematology f/u if needed    #Sleep/Mood  - Melatonin 6mg at HS  - Psych rec Xanax 0.25mg PRN    #Skin  - Skin: Left lower abdomen ruptured blister 3.0 x 1.0, stage 2 pressure injury to right buttock 4 x1 and left buttock 3x1, stage 2 pressure injury ti right inner thigh 0.2 x 0.2, right groin wound 10.5 x 2.0,   Wound care review, Left groin wound 11/15--- 0.6x1.8.0.1cm, no slough, healthy base  -- MAD to skin folds- Nystatin to submammary and abdominal pannus BID  -- MAD to L groin- cleanse with NS, Triad cream daily  -- Mad to R groin- Nystatin powder daily   -- R groin wound-- aquacel packing, Triad to periwound, Allevyn foam- daily and PRN   - Pressure injury/Skin: OOB to Chair, PT/OT   - Offload pressure, low air loss support surfaces, T&P every 2 hours   --Wound care consult-10/9 -Multiple wounds--perineal and buttock/sacral, recs appreciated   --Suggest Continue treatments as below:   Twice daily & PRN Normal Saline Cleanse of abdominal pannus & breast folds, perineal, Left & Right inner buttock erosions.  Pat thoroughly dry.   Apply Desitin generously twice daily (& PRN) to perineal, buttocks, and left groin erosions, leave open to air.    Apply Nystatin powder to abdominal pannus and breast folds.  Suggest use of Interdry cloths in folds to 'wick' moisture.  Suggest daily Normal Saline Cleanse of right groin surgical wound, pat dry.  Apply Cavillon film barrier to intact periwound skin.  Place Aquacell strip over open area, cover with foam dressing.  - Wound care following     #Pain Mgmt   - Tylenol PRN   - Gabapentin 300mg at HS     #GI/Bowel Mgmt   - Pantoprazole  - Senna  --on hold due to recent Loose BM  - PRN: Maalox   - Stool count  --Lactobacillus BID   -- AB XR mod stool burden on transverse colon 10/23--still has mod stool burden, but moving bowel appropriately  - S/p enema and lactulose    # Alternating bowel function --continue probiotic, lactobacillus   * Leucocytosis probably steroid related and partly due to her Mixed Connective Tissue Disease, will continue monitoring     #/Bladder Mgmt   -Spontaneously voiding   - UA (11/3) negative    #FEN   #Dysphagia  - Diet - Minced & Moist  [CC]    - Dysphaiga- SLP evaluation     #Health maintenance  - Folic Acid   - MVI  - Ocean nasal spray    # Difficulty with access to peripheral veins-- Blood draws from Left arm Medline     #Precautions / PROPHYLAXIS:   - Falls  - ortho: Weight bearing status: WBAT   - Lungs: Aspiration, Incentive Spirometer   - DVT PPX: Eliquis 5 mg BID     11/13 --Labs CBC mild anemia, normal renal function, LFT elevated, non progressive     IDT conference on 11/14  Social Work: Await peer to peer with CM. Provided private hire list. Lives with spouse in [place] 6 months of year. DC to apt in [place] with elevator, no steps. Supportive spouse.   Function--Supervision for eating and grooming upper body, mod assistance with lower body dressing, max assist with toileting, but patient making sustained effort engaging  Improving motor strength at ankles and knees.   Transfers and ambulation--marked progress  Able to self propell her manual wheel chair with minimum assistance, increasing distance, Able to transfer from higher to lower level, max assistance with slide board  Ambulating in light gate max assist x 2 up to 40 ft  Barriers--need for max assistance for transfers  DME--Host bed, Damaris lift, WC  Est d 11/27--sustained motor improvement of upper extremity function, recently making progress with LE motor function progressing from distal to proxima  (Overall improvement--clinical (healing of Rt groin wound, no new findings on Chest CT as noted by pulmonary team. Functional--improvement with transfers, ambulation and LE strength)  Medical and pulmonary teams monitoring leucocytosis and oxy tapering  Est dc revised to 11/27      OUTPATIENT/FOLLOW UP:    Trae Whitney  Pulmonary Disease  [address], [address] [place] [address]  Phone: [phone]  Fax: [phone]  Follow Up Time: 2 weeks    Ryanne Allen  Rheumatology  [address] [address]  Phone: [phone]  Fax: [phone]  Follow Up Time: 1 week    Kyle Pierce  Internal Medicine  [address] [place] [address], [address]  [place] [address]  Phone: [phone]  Fax: [phone]  Follow Up Time: 2 weeks    Addy Powers  Pulmonary Disease  [address] [facility], [address]  [place], [place] [address]  Phone: [phone]  Fax: [phone]  Follow Up Time:     Kwame Hawley  Critical Care Medicine  [address] [facility], [address]  [place] [address]  Phone: [phone]  Fax: [phone]  Follow Up Time:     Neena Borrego  Rheumatology  [address] [place] [address], [address]  [place] [address]  Phone: [phone]  Fax: [phone]  Follow Up Time:    [facility]  [address]  Scheduled Appointment: 09/13/2023  2:40 PM

## 2023-11-17 NOTE — PROGRESS NOTE ADULT - SUBJECTIVE AND OBJECTIVE BOX
no acute events overnight      PHYSICAL EXAM:    Vital Signs Last 24 Hrs  T(F): 98.4 (17 Nov 2023 08:45), Max: 98.6 (16 Nov 2023 23:05)  HR: 86 (17 Nov 2023 08:45) (86 - 87)  BP: 114/74 (17 Nov 2023 08:45) (111/74 - 114/74)  RR: 16 (17 Nov 2023 08:45) (16 - 16)  SpO2: 95% (17 Nov 2023 08:45) (91% - 95%)  I&O's Summary      GENERAL: NAD  HEENT: NCAT  CHEST/LUNG: No increased WOB, Clear to percussion bilaterally; No rales, rhonchi, wheezing  HEART: Regular rate and rhythm; No murmurs  ABDOMEN: Soft, Nontender, Nondistended; Bowel sounds present  MUSCULOSKELETAL/EXTREMITIES:  2+ Peripheral Pulses, No LE edema  PSYCH: Appropriate affect, Alert & Oriented    LABS:                        10.5   14.19 )-----------( 338      ( 16 Nov 2023 08:47 )             34.0       11-16    137  |  101  |  19  ----------------------------<  103  3.5   |  30  |  0.50    Ca    9.2      16 Nov 2023 08:47    TPro  5.5  /  Alb  2.7  /  TBili  0.9  /  DBili  x   /  AST  69  /  ALT  120  /  AlkPhos  272  11-16                09-25 Chol -- LDL -- HDL -- Trig 199 mg/dL                  Urinalysis Basic - ( 16 Nov 2023 08:47 )    Color: x / Appearance: x / SG: x / pH: x  Gluc: 103 mg/dL / Ketone: x  / Bili: x / Urobili: x   Blood: x / Protein: x / Nitrite: x   Leuk Esterase: x / RBC: x / WBC x   Sq Epi: x / Non Sq Epi: x / Bacteria: x            MEDICATIONS  (STANDING):  ammonium lactate 12% Lotion 1 Application(s) Topical every 12 hours  apixaban 5 milliGRAM(s) Oral every 12 hours  artificial  tears Solution 1 Drop(s) Both EYES every 12 hours  ascorbic acid 500 milliGRAM(s) Oral daily  atovaquone  Suspension 1500 milliGRAM(s) Oral daily  bisacodyl 5 milliGRAM(s) Oral <User Schedule>  dextrose 5%. 1000 milliLiter(s) (50 mL/Hr) IV Continuous <Continuous>  dextrose 5%. 1000 milliLiter(s) (100 mL/Hr) IV Continuous <Continuous>  dextrose 50% Injectable 25 Gram(s) IV Push once  dextrose 50% Injectable 12.5 Gram(s) IV Push once  dextrose 50% Injectable 25 Gram(s) IV Push once  folic acid 1 milliGRAM(s) Oral daily  gabapentin 300 milliGRAM(s) Oral three times a day  glucagon  Injectable 1 milliGRAM(s) IntraMuscular once  hydrocortisone hemorrhoidal Suppository 1 Suppository(s) Rectal two times a day  lactobacillus acidophilus 1 Tablet(s) Oral two times a day with meals  melatonin 6 milliGRAM(s) Oral at bedtime  methylPREDNISolone 44 milliGRAM(s) Oral every 24 hours  methylPREDNISolone   Oral   metoprolol tartrate 12.5 milliGRAM(s) Oral two times a day  multivitamin 1 Tablet(s) Oral daily  nystatin Powder 1 Application(s) Topical two times a day  pantoprazole    Tablet 40 milliGRAM(s) Oral before breakfast  psyllium Powder 1 Packet(s) Oral daily  sodium chloride 0.9% lock flush 5 milliLiter(s) IV Push two times a day  zinc oxide 40% Paste 1 Application(s) Topical three times a day    MEDICATIONS  (PRN):  albuterol/ipratropium for Nebulization 3 milliLiter(s) Nebulizer every 6 hours PRN Shortness of Breath and/or Wheezing  ALPRAZolam 0.25 milliGRAM(s) Oral every 6 hours PRN Anxiety  aluminum hydroxide/magnesium hydroxide/simethicone Suspension 30 milliLiter(s) Oral every 4 hours PRN Dyspepsia  benzocaine/menthol Lozenge 1 Lozenge Oral two times a day PRN Sore Throat  benzonatate 100 milliGRAM(s) Oral three times a day PRN Cough  dextrose Oral Gel 15 Gram(s) Oral once PRN Blood Glucose LESS THAN 70 milliGRAM(s)/deciliter  loperamide 2 milliGRAM(s) Oral three times a day PRN Diarrhea  polyethylene glycol 3350 17 Gram(s) Oral daily PRN Constipation  SIMETHICONE SOFTGELS 250 milliGRAM(s) 250 milliGRAM(s) Oral three times a day PRN for gas  sodium chloride 0.65% Nasal 1 Spray(s) Both Nostrils every 8 hours PRN Nasal Congestion      Care Discussed with Consultants/Other Providers: Yes

## 2023-11-18 PROCEDURE — 99232 SBSQ HOSP IP/OBS MODERATE 35: CPT

## 2023-11-18 RX ADMIN — Medication 1 TABLET(S): at 18:03

## 2023-11-18 RX ADMIN — Medication 44 MILLIGRAM(S): at 10:25

## 2023-11-18 RX ADMIN — NYSTATIN CREAM 1 APPLICATION(S): 100000 CREAM TOPICAL at 08:32

## 2023-11-18 RX ADMIN — APIXABAN 5 MILLIGRAM(S): 2.5 TABLET, FILM COATED ORAL at 21:37

## 2023-11-18 RX ADMIN — GABAPENTIN 300 MILLIGRAM(S): 400 CAPSULE ORAL at 21:37

## 2023-11-18 RX ADMIN — Medication 12.5 MILLIGRAM(S): at 21:38

## 2023-11-18 RX ADMIN — Medication 6 MILLIGRAM(S): at 21:37

## 2023-11-18 RX ADMIN — ZINC OXIDE 1 APPLICATION(S): 200 OINTMENT TOPICAL at 17:49

## 2023-11-18 RX ADMIN — GABAPENTIN 300 MILLIGRAM(S): 400 CAPSULE ORAL at 14:47

## 2023-11-18 RX ADMIN — PANTOPRAZOLE SODIUM 40 MILLIGRAM(S): 20 TABLET, DELAYED RELEASE ORAL at 08:46

## 2023-11-18 RX ADMIN — APIXABAN 5 MILLIGRAM(S): 2.5 TABLET, FILM COATED ORAL at 08:46

## 2023-11-18 RX ADMIN — Medication 1 TABLET(S): at 14:47

## 2023-11-18 RX ADMIN — Medication 1 APPLICATION(S): at 08:32

## 2023-11-18 RX ADMIN — Medication 1 MILLIGRAM(S): at 14:47

## 2023-11-18 RX ADMIN — SODIUM CHLORIDE 5 MILLILITER(S): 9 INJECTION INTRAMUSCULAR; INTRAVENOUS; SUBCUTANEOUS at 08:32

## 2023-11-18 RX ADMIN — Medication 1 TABLET(S): at 08:46

## 2023-11-18 RX ADMIN — Medication 500 MILLIGRAM(S): at 14:47

## 2023-11-18 RX ADMIN — ZINC OXIDE 1 APPLICATION(S): 200 OINTMENT TOPICAL at 08:32

## 2023-11-18 RX ADMIN — Medication 12.5 MILLIGRAM(S): at 08:56

## 2023-11-18 RX ADMIN — ZINC OXIDE 1 APPLICATION(S): 200 OINTMENT TOPICAL at 21:36

## 2023-11-18 RX ADMIN — SODIUM CHLORIDE 5 MILLILITER(S): 9 INJECTION INTRAMUSCULAR; INTRAVENOUS; SUBCUTANEOUS at 17:50

## 2023-11-18 RX ADMIN — GABAPENTIN 300 MILLIGRAM(S): 400 CAPSULE ORAL at 08:46

## 2023-11-18 RX ADMIN — ATOVAQUONE 1500 MILLIGRAM(S): 750 SUSPENSION ORAL at 14:48

## 2023-11-18 NOTE — PROGRESS NOTE ADULT - SUBJECTIVE AND OBJECTIVE BOX
Medicine Progress Note    Patient is a 62y old  Female who presents with a chief complaint of Interstitial Lung Disease (18 Nov 2023 12:53)      SUBJECTIVE / OVERNIGHT EVENTS:  chronic nocturnal desat during sleep. use Oxygen. mo acute complaints    ADDITIONAL REVIEW OF SYSTEMS  denied fever/chills/CP/SOB/cough/palpitation/dizziness/abdominal pian/nausea/vomiting/diarrhoea/constipation/dysuria/leg or calf pain/headaches.all other ROS neg    MEDICATIONS  (STANDING):  ammonium lactate 12% Lotion 1 Application(s) Topical every 12 hours  apixaban 5 milliGRAM(s) Oral every 12 hours  artificial  tears Solution 1 Drop(s) Both EYES every 12 hours  ascorbic acid 500 milliGRAM(s) Oral daily  atovaquone  Suspension 1500 milliGRAM(s) Oral daily  bisacodyl 5 milliGRAM(s) Oral <User Schedule>  dextrose 5%. 1000 milliLiter(s) (50 mL/Hr) IV Continuous <Continuous>  dextrose 5%. 1000 milliLiter(s) (100 mL/Hr) IV Continuous <Continuous>  dextrose 50% Injectable 25 Gram(s) IV Push once  dextrose 50% Injectable 12.5 Gram(s) IV Push once  dextrose 50% Injectable 25 Gram(s) IV Push once  folic acid 1 milliGRAM(s) Oral daily  gabapentin 300 milliGRAM(s) Oral three times a day  glucagon  Injectable 1 milliGRAM(s) IntraMuscular once  hydrocortisone hemorrhoidal Suppository 1 Suppository(s) Rectal two times a day  lactobacillus acidophilus 1 Tablet(s) Oral two times a day with meals  melatonin 6 milliGRAM(s) Oral at bedtime  methylPREDNISolone 44 milliGRAM(s) Oral daily  metoprolol tartrate 12.5 milliGRAM(s) Oral two times a day  multivitamin 1 Tablet(s) Oral daily  nystatin Powder 1 Application(s) Topical two times a day  pantoprazole    Tablet 40 milliGRAM(s) Oral before breakfast  psyllium Powder 1 Packet(s) Oral daily  sodium chloride 0.9% lock flush 5 milliLiter(s) IV Push two times a day  zinc oxide 40% Paste 1 Application(s) Topical three times a day    MEDICATIONS  (PRN):  albuterol/ipratropium for Nebulization 3 milliLiter(s) Nebulizer every 6 hours PRN Shortness of Breath and/or Wheezing  ALPRAZolam 0.25 milliGRAM(s) Oral every 6 hours PRN Anxiety  aluminum hydroxide/magnesium hydroxide/simethicone Suspension 30 milliLiter(s) Oral every 4 hours PRN Dyspepsia  benzocaine/menthol Lozenge 1 Lozenge Oral two times a day PRN Sore Throat  benzonatate 100 milliGRAM(s) Oral three times a day PRN Cough  dextrose Oral Gel 15 Gram(s) Oral once PRN Blood Glucose LESS THAN 70 milliGRAM(s)/deciliter  loperamide 2 milliGRAM(s) Oral three times a day PRN Diarrhea  polyethylene glycol 3350 17 Gram(s) Oral daily PRN Constipation  SIMETHICONE SOFTGELS 250 milliGRAM(s) 250 milliGRAM(s) Oral three times a day PRN for gas  sodium chloride 0.65% Nasal 1 Spray(s) Both Nostrils every 8 hours PRN Nasal Congestion    CAPILLARY BLOOD GLUCOSE        I&O's Summary      PHYSICAL EXAM:  Vital Signs Last 24 Hrs  T(C): 37 (17 Nov 2023 22:24), Max: 37 (17 Nov 2023 22:24)  T(F): 98.6 (17 Nov 2023 22:24), Max: 98.6 (17 Nov 2023 22:24)  HR: 87 (18 Nov 2023 08:40) (86 - 87)  BP: 114/66 (18 Nov 2023 08:40) (114/66 - 126/73)  BP(mean): --  RR: 16 (18 Nov 2023 08:40) (16 - 16)  SpO2: 96% (18 Nov 2023 08:40) (86% - 96%)    Parameters below as of 18 Nov 2023 08:40  Patient On (Oxygen Delivery Method): nasal cannula    GENERAL: Not in distress. Alert    HEENT: clear conjuctiva, MMM. no pallor or icterus  CARDIOVASCULAR: RRR S1, S2. No murmur/rubs/gallop  LUNGS: BLAE reduced, no rales, no wheezing, no rhonchi.    ABDOMEN: ND. Soft,  NT, no guarding / rebound / rigidity. BS normoactive  BACK: No spine tenderness.  EXTREMITIES: no edema. no leg or calf TP.  SKIN: warm and dry  PSYCHIATRIC: Calm.  No agitation.  CNS: AAO. moves limbs, follows commands    LABS:                    COVID-19 PCR: NotDetec (05 Oct 2023 20:17)  SARS-CoV-2: NotDetec (30 Sep 2023 12:52)  SARS-CoV-2: NotDetec (13 Sep 2023 17:55)  SARS-CoV-2: NotDetec (10 Sep 2023 11:24)  SARS-CoV-2: NotDetec (26 Aug 2023 12:55)      RADIOLOGY & ADDITIONAL TESTS:  Imaging from Last 24 Hours:    Electrocardiogram/QTc Interval:    COORDINATION OF CARE:  Care Discussed with Consultants/Other Providers:

## 2023-11-18 NOTE — PROGRESS NOTE ADULT - ASSESSMENT
62F with AF, hx sciatica, newly diagnosed ILD (likely associated with MCTD +ARIANA, +RNP, +Sm) with exacerbation requiring ECMO / plasmapharesis / Rituximab / steriods, now at acute rehab    #Transaminitis  -stable    #chronic macrocytic anemia  - H/H remains stable  - on MVI, folate and thiamine    #hyponatremia, improved    #leukocytosis due to steroid use, improved.    #Interstitial Lung Disease  #Mixed connective tissue disease  #Suspect critical illness myopathy from prolonged ICU stay, resolved  - CT 11/10 new predominant peripheral reticular opacities, within lung paranchyma; no significant bronchiectasis or honeycombing.   -c/w steroids - taper as scheduled (2 week intervals)   -c/w nebs  -c/w PT/OT  -Mepron for PCP ppx while on steroids   - ambulatory O2 sat    #PAF  #Non-occlusive right IJ thrombus   -c/w Eliquis  -c/w lopressor 12.5mg po BID  - goal HR for pAfib is <110    #Anxiety  -appreciate psych follow-up  -c/w Xanax PRN    # ?JACEY  # nocturnal desat  - discussed about CPAP use. oxygen use during sleep and PRN. please check nocturnal O2 sat during sleep and document. patient may need OP sleep study. she can use her CPAP machine if she has any.       #DVT ppx: Eliquis  labs reviewed as of 11/16  discussed with primary team.

## 2023-11-18 NOTE — PROGRESS NOTE ADULT - SUBJECTIVE AND OBJECTIVE BOX
Cc: Gait dysfunction    HPI: Patient with no new medical issues overnight.  Pain controlled, no chest pain, no N/V, no Fevers/Chills. No other new ROS  Has been tolerating rehabilitation program.    albuterol/ipratropium for Nebulization 3 milliLiter(s) Nebulizer every 6 hours PRN  ALPRAZolam 0.25 milliGRAM(s) Oral every 6 hours PRN  aluminum hydroxide/magnesium hydroxide/simethicone Suspension 30 milliLiter(s) Oral every 4 hours PRN  ammonium lactate 12% Lotion 1 Application(s) Topical every 12 hours  apixaban 5 milliGRAM(s) Oral every 12 hours  artificial  tears Solution 1 Drop(s) Both EYES every 12 hours  ascorbic acid 500 milliGRAM(s) Oral daily  atovaquone  Suspension 1500 milliGRAM(s) Oral daily  benzocaine/menthol Lozenge 1 Lozenge Oral two times a day PRN  benzonatate 100 milliGRAM(s) Oral three times a day PRN  bisacodyl 5 milliGRAM(s) Oral <User Schedule>  dextrose 5%. 1000 milliLiter(s) IV Continuous <Continuous>  dextrose 5%. 1000 milliLiter(s) IV Continuous <Continuous>  dextrose 50% Injectable 25 Gram(s) IV Push once  dextrose 50% Injectable 12.5 Gram(s) IV Push once  dextrose 50% Injectable 25 Gram(s) IV Push once  dextrose Oral Gel 15 Gram(s) Oral once PRN  folic acid 1 milliGRAM(s) Oral daily  gabapentin 300 milliGRAM(s) Oral three times a day  glucagon  Injectable 1 milliGRAM(s) IntraMuscular once  hydrocortisone hemorrhoidal Suppository 1 Suppository(s) Rectal two times a day  lactobacillus acidophilus 1 Tablet(s) Oral two times a day with meals  loperamide 2 milliGRAM(s) Oral three times a day PRN  melatonin 6 milliGRAM(s) Oral at bedtime  methylPREDNISolone 44 milliGRAM(s) Oral daily  metoprolol tartrate 12.5 milliGRAM(s) Oral two times a day  multivitamin 1 Tablet(s) Oral daily  nystatin Powder 1 Application(s) Topical two times a day  pantoprazole    Tablet 40 milliGRAM(s) Oral before breakfast  polyethylene glycol 3350 17 Gram(s) Oral daily PRN  psyllium Powder 1 Packet(s) Oral daily  SIMETHICONE SOFTGELS 250 milliGRAM(s) 250 milliGRAM(s) Oral three times a day PRN  sodium chloride 0.65% Nasal 1 Spray(s) Both Nostrils every 8 hours PRN  sodium chloride 0.9% lock flush 5 milliLiter(s) IV Push two times a day  zinc oxide 40% Paste 1 Application(s) Topical three times a day      T(C): 37 (11-17-23 @ 22:24), Max: 37 (11-17-23 @ 22:24)  HR: 87 (11-18-23 @ 08:40) (86 - 87)  BP: 114/66 (11-18-23 @ 08:40) (114/66 - 126/73)  RR: 16 (11-18-23 @ 08:40) (16 - 16)  SpO2: 96% (11-18-23 @ 08:40) (86% - 96%)    In NAD  HEENT- EOMI  Heart- RRR, S1S2  Lungs- CTA bl.  Abd- + BS, NT  Ext- No calf pain  Neuro- Exam unchanged

## 2023-11-18 NOTE — PROGRESS NOTE ADULT - ASSESSMENT
Imp: Patient with diagnosis of    interstitial lung disease      admitted for comprehensive acute rehabilitation.    Plan:  - Continue PT/OT/SLP  - DVT prophylaxis  - Skin- Turn q2h, check skin daily  - Continue current medications; patient medically stable.   -Active issues-   - Patient is stable to continue current rehabilitation program.   - patient performing lung prophylaxis and able to describe to writer its importance  - patient and writer discussed at length their hydration, including patient's distaste for blue gatorade, writer advised alternative drinks without as much sugar

## 2023-11-19 PROCEDURE — 99232 SBSQ HOSP IP/OBS MODERATE 35: CPT

## 2023-11-19 RX ADMIN — Medication 44 MILLIGRAM(S): at 08:24

## 2023-11-19 RX ADMIN — ATOVAQUONE 1500 MILLIGRAM(S): 750 SUSPENSION ORAL at 11:57

## 2023-11-19 RX ADMIN — NYSTATIN CREAM 1 APPLICATION(S): 100000 CREAM TOPICAL at 08:25

## 2023-11-19 RX ADMIN — Medication 12.5 MILLIGRAM(S): at 08:24

## 2023-11-19 RX ADMIN — GABAPENTIN 300 MILLIGRAM(S): 400 CAPSULE ORAL at 08:24

## 2023-11-19 RX ADMIN — Medication 1 TABLET(S): at 20:09

## 2023-11-19 RX ADMIN — ZINC OXIDE 1 APPLICATION(S): 200 OINTMENT TOPICAL at 13:08

## 2023-11-19 RX ADMIN — GABAPENTIN 300 MILLIGRAM(S): 400 CAPSULE ORAL at 20:10

## 2023-11-19 RX ADMIN — APIXABAN 5 MILLIGRAM(S): 2.5 TABLET, FILM COATED ORAL at 20:10

## 2023-11-19 RX ADMIN — ZINC OXIDE 1 APPLICATION(S): 200 OINTMENT TOPICAL at 08:23

## 2023-11-19 RX ADMIN — APIXABAN 5 MILLIGRAM(S): 2.5 TABLET, FILM COATED ORAL at 08:24

## 2023-11-19 RX ADMIN — Medication 1 MILLIGRAM(S): at 11:58

## 2023-11-19 RX ADMIN — Medication 500 MILLIGRAM(S): at 11:58

## 2023-11-19 RX ADMIN — SODIUM CHLORIDE 5 MILLILITER(S): 9 INJECTION INTRAMUSCULAR; INTRAVENOUS; SUBCUTANEOUS at 08:37

## 2023-11-19 RX ADMIN — Medication 1 TABLET(S): at 08:24

## 2023-11-19 RX ADMIN — Medication 1 APPLICATION(S): at 08:23

## 2023-11-19 RX ADMIN — ZINC OXIDE 1 APPLICATION(S): 200 OINTMENT TOPICAL at 20:10

## 2023-11-19 RX ADMIN — Medication 12.5 MILLIGRAM(S): at 20:10

## 2023-11-19 RX ADMIN — PANTOPRAZOLE SODIUM 40 MILLIGRAM(S): 20 TABLET, DELAYED RELEASE ORAL at 08:25

## 2023-11-19 RX ADMIN — Medication 6 MILLIGRAM(S): at 20:10

## 2023-11-19 RX ADMIN — SODIUM CHLORIDE 5 MILLILITER(S): 9 INJECTION INTRAMUSCULAR; INTRAVENOUS; SUBCUTANEOUS at 17:22

## 2023-11-19 RX ADMIN — Medication 1 TABLET(S): at 11:58

## 2023-11-19 RX ADMIN — GABAPENTIN 300 MILLIGRAM(S): 400 CAPSULE ORAL at 14:04

## 2023-11-19 NOTE — PROGRESS NOTE ADULT - SUBJECTIVE AND OBJECTIVE BOX
Medicine Progress Note    Patient is a 62y old  Female who presents with a chief complaint of Interstitial Lung Disease (18 Nov 2023 16:21)      SUBJECTIVE / OVERNIGHT EVENTS:    ADDITIONAL REVIEW OF SYSTEMS:    MEDICATIONS  (STANDING):  ammonium lactate 12% Lotion 1 Application(s) Topical every 12 hours  apixaban 5 milliGRAM(s) Oral every 12 hours  artificial  tears Solution 1 Drop(s) Both EYES every 12 hours  ascorbic acid 500 milliGRAM(s) Oral daily  atovaquone  Suspension 1500 milliGRAM(s) Oral daily  bisacodyl 5 milliGRAM(s) Oral <User Schedule>  dextrose 5%. 1000 milliLiter(s) (50 mL/Hr) IV Continuous <Continuous>  dextrose 5%. 1000 milliLiter(s) (100 mL/Hr) IV Continuous <Continuous>  dextrose 50% Injectable 25 Gram(s) IV Push once  dextrose 50% Injectable 12.5 Gram(s) IV Push once  dextrose 50% Injectable 25 Gram(s) IV Push once  folic acid 1 milliGRAM(s) Oral daily  gabapentin 300 milliGRAM(s) Oral three times a day  glucagon  Injectable 1 milliGRAM(s) IntraMuscular once  hydrocortisone hemorrhoidal Suppository 1 Suppository(s) Rectal two times a day  lactobacillus acidophilus 1 Tablet(s) Oral two times a day with meals  melatonin 6 milliGRAM(s) Oral at bedtime  methylPREDNISolone 44 milliGRAM(s) Oral daily  metoprolol tartrate 12.5 milliGRAM(s) Oral two times a day  multivitamin 1 Tablet(s) Oral daily  nystatin Powder 1 Application(s) Topical two times a day  pantoprazole    Tablet 40 milliGRAM(s) Oral before breakfast  psyllium Powder 1 Packet(s) Oral daily  sodium chloride 0.9% lock flush 5 milliLiter(s) IV Push two times a day  zinc oxide 40% Paste 1 Application(s) Topical three times a day    MEDICATIONS  (PRN):  albuterol/ipratropium for Nebulization 3 milliLiter(s) Nebulizer every 6 hours PRN Shortness of Breath and/or Wheezing  ALPRAZolam 0.25 milliGRAM(s) Oral every 6 hours PRN Anxiety  aluminum hydroxide/magnesium hydroxide/simethicone Suspension 30 milliLiter(s) Oral every 4 hours PRN Dyspepsia  benzocaine/menthol Lozenge 1 Lozenge Oral two times a day PRN Sore Throat  benzonatate 100 milliGRAM(s) Oral three times a day PRN Cough  dextrose Oral Gel 15 Gram(s) Oral once PRN Blood Glucose LESS THAN 70 milliGRAM(s)/deciliter  loperamide 2 milliGRAM(s) Oral three times a day PRN Diarrhea  polyethylene glycol 3350 17 Gram(s) Oral daily PRN Constipation  SIMETHICONE SOFTGELS 250 milliGRAM(s) 250 milliGRAM(s) Oral three times a day PRN for gas  sodium chloride 0.65% Nasal 1 Spray(s) Both Nostrils every 8 hours PRN Nasal Congestion    CAPILLARY BLOOD GLUCOSE        I&O's Summary      PHYSICAL EXAM:  Vital Signs Last 24 Hrs  T(C): 37 (18 Nov 2023 21:32), Max: 37 (18 Nov 2023 21:32)  T(F): 98.6 (18 Nov 2023 21:32), Max: 98.6 (18 Nov 2023 21:32)  HR: 76 (18 Nov 2023 21:32) (76 - 87)  BP: 131/74 (18 Nov 2023 21:32) (114/66 - 131/74)  BP(mean): --  RR: 16 (18 Nov 2023 21:32) (16 - 16)  SpO2: 96% (18 Nov 2023 21:32) (96% - 96%)    Parameters below as of 18 Nov 2023 21:32  Patient On (Oxygen Delivery Method): nasal cannula  O2 Flow (L/min): 1    GENERAL: Not in distress. Alert    HEENT: clear conjuctiva, MMM. no pallor or icterus  CARDIOVASCULAR: RRR S1, S2. No murmur/rubs/gallop  LUNGS: BLAE reduced, no rales, no wheezing, no rhonchi.    ABDOMEN: ND. Soft,  NT, no guarding / rebound / rigidity. BS normoactive  BACK: No spine tenderness.  EXTREMITIES: no edema. no leg or calf TP.  SKIN: warm and dry  PSYCHIATRIC: Calm.  No agitation.  CNS: AAO. moves limbs, follows commands    LABS:                    COVID-19 PCR: NotDetec (05 Oct 2023 20:17)  SARS-CoV-2: NotDetec (30 Sep 2023 12:52)  SARS-CoV-2: NotDetec (13 Sep 2023 17:55)  SARS-CoV-2: NotDetec (10 Sep 2023 11:24)  SARS-CoV-2: NotDetec (26 Aug 2023 12:55)      RADIOLOGY & ADDITIONAL TESTS:  Imaging from Last 24 Hours:    Electrocardiogram/QTc Interval:    COORDINATION OF CARE:  Care Discussed with Consultants/Other Providers:   Medicine Progress Note    Patient is a 62y old  Female who presents with a chief complaint of Interstitial Lung Disease (18 Nov 2023 16:21)      SUBJECTIVE / OVERNIGHT EVENTS:  seen and examined  Chart reviewed  No overnight events  Limb weakness improving with therapy  BM+  No pain  No complaints    ADDITIONAL REVIEW OF SYSTEMS:  denied fever/chills/CP/SOB/cough/palpitation/dizziness/abdominal pian/nausea/vomiting/diarrhoea/constipation/dysuria/leg or calf pain/headaches.all other ROS neg    MEDICATIONS  (STANDING):  ammonium lactate 12% Lotion 1 Application(s) Topical every 12 hours  apixaban 5 milliGRAM(s) Oral every 12 hours  artificial  tears Solution 1 Drop(s) Both EYES every 12 hours  ascorbic acid 500 milliGRAM(s) Oral daily  atovaquone  Suspension 1500 milliGRAM(s) Oral daily  bisacodyl 5 milliGRAM(s) Oral <User Schedule>  dextrose 5%. 1000 milliLiter(s) (50 mL/Hr) IV Continuous <Continuous>  dextrose 5%. 1000 milliLiter(s) (100 mL/Hr) IV Continuous <Continuous>  dextrose 50% Injectable 25 Gram(s) IV Push once  dextrose 50% Injectable 12.5 Gram(s) IV Push once  dextrose 50% Injectable 25 Gram(s) IV Push once  folic acid 1 milliGRAM(s) Oral daily  gabapentin 300 milliGRAM(s) Oral three times a day  glucagon  Injectable 1 milliGRAM(s) IntraMuscular once  hydrocortisone hemorrhoidal Suppository 1 Suppository(s) Rectal two times a day  lactobacillus acidophilus 1 Tablet(s) Oral two times a day with meals  melatonin 6 milliGRAM(s) Oral at bedtime  methylPREDNISolone 44 milliGRAM(s) Oral daily  metoprolol tartrate 12.5 milliGRAM(s) Oral two times a day  multivitamin 1 Tablet(s) Oral daily  nystatin Powder 1 Application(s) Topical two times a day  pantoprazole    Tablet 40 milliGRAM(s) Oral before breakfast  psyllium Powder 1 Packet(s) Oral daily  sodium chloride 0.9% lock flush 5 milliLiter(s) IV Push two times a day  zinc oxide 40% Paste 1 Application(s) Topical three times a day    MEDICATIONS  (PRN):  albuterol/ipratropium for Nebulization 3 milliLiter(s) Nebulizer every 6 hours PRN Shortness of Breath and/or Wheezing  ALPRAZolam 0.25 milliGRAM(s) Oral every 6 hours PRN Anxiety  aluminum hydroxide/magnesium hydroxide/simethicone Suspension 30 milliLiter(s) Oral every 4 hours PRN Dyspepsia  benzocaine/menthol Lozenge 1 Lozenge Oral two times a day PRN Sore Throat  benzonatate 100 milliGRAM(s) Oral three times a day PRN Cough  dextrose Oral Gel 15 Gram(s) Oral once PRN Blood Glucose LESS THAN 70 milliGRAM(s)/deciliter  loperamide 2 milliGRAM(s) Oral three times a day PRN Diarrhea  polyethylene glycol 3350 17 Gram(s) Oral daily PRN Constipation  SIMETHICONE SOFTGELS 250 milliGRAM(s) 250 milliGRAM(s) Oral three times a day PRN for gas  sodium chloride 0.65% Nasal 1 Spray(s) Both Nostrils every 8 hours PRN Nasal Congestion    CAPILLARY BLOOD GLUCOSE        I&O's Summary      PHYSICAL EXAM:  Vital Signs Last 24 Hrs  T(C): 37 (18 Nov 2023 21:32), Max: 37 (18 Nov 2023 21:32)  T(F): 98.6 (18 Nov 2023 21:32), Max: 98.6 (18 Nov 2023 21:32)  HR: 76 (18 Nov 2023 21:32) (76 - 87)  BP: 131/74 (18 Nov 2023 21:32) (114/66 - 131/74)  BP(mean): --  RR: 16 (18 Nov 2023 21:32) (16 - 16)  SpO2: 96% (18 Nov 2023 21:32) (96% - 96%)    Parameters below as of 18 Nov 2023 21:32  Patient On (Oxygen Delivery Method): nasal cannula  O2 Flow (L/min): 1    GENERAL: Not in distress. Alert    HEENT: clear conjuctiva, MMM. no pallor or icterus  CARDIOVASCULAR: RRR S1, S2. No murmur/rubs/gallop  LUNGS: BLAE reduced, no rales, no wheezing, no rhonchi.    ABDOMEN: ND. Soft,  NT, no guarding / rebound / rigidity. BS normoactive  BACK: No spine tenderness.  EXTREMITIES: no edema. no leg or calf TP.  SKIN: warm and dry  PSYCHIATRIC: Calm.  No agitation.  CNS: AAO. moves limbs, follows commands    LABS:                    COVID-19 PCR: NotDetec (05 Oct 2023 20:17)  SARS-CoV-2: NotDetec (30 Sep 2023 12:52)  SARS-CoV-2: NotDetec (13 Sep 2023 17:55)  SARS-CoV-2: NotDetec (10 Sep 2023 11:24)  SARS-CoV-2: NotDetec (26 Aug 2023 12:55)      RADIOLOGY & ADDITIONAL TESTS:  Imaging from Last 24 Hours:    Electrocardiogram/QTc Interval:    COORDINATION OF CARE:  Care Discussed with Consultants/Other Providers:

## 2023-11-19 NOTE — PROGRESS NOTE ADULT - ASSESSMENT
62F with AF, hx sciatica, newly diagnosed ILD (likely associated with MCTD +ARIANA, +RNP, +Sm) with exacerbation requiring ECMO / plasmapharesis / Rituximab / steriods, now at acute rehab    #Transaminitis  - LFT trensding down.  - limit Tylenol use, avoid statin and other hepatoxins until LFT normalized  - monitor    #chronic macrocytic anemia  - H/H remains stable  - on MVI, folate and thiamine    #hyponatremia,   - improved    #leukocytosis due to steroid use,   - improving  - non-focal    #Interstitial Lung Disease  #Mixed connective tissue disease  #Suspect critical illness myopathy from prolonged ICU stay, resolved  - CT 11/10 new predominant peripheral reticular opacities, within lung paranchyma; no significant bronchiectasis or honeycombing.   -c/w steroids - taper as scheduled (2 week intervals)   -c/w nebs  -c/w PT/OT  -Mepron for PCP ppx while on steroids   - check ambulatory O2 sat periodically    #PAF  #Non-occlusive right IJ thrombus   -c/w Eliquis  -c/w lopressor 12.5mg po BID  - goal HR for pAfib is <110    #Anxiety  -appreciate psych follow-up  -c/w Xanax PRN    # ?JACEY  # nocturnal desat  - discussed about CPAP use. oxygen use during sleep and PRN. please check nocturnal O2 sat during sleep and document. patient may need OP sleep study. she can use her CPAP machine.       #DVT ppx: Eliquis   62F with AF, hx sciatica, newly diagnosed ILD (likely associated with MCTD +ARIANA, +RNP, +Sm) with exacerbation requiring ECMO / plasmapharesis / Rituximab / steriods, now at acute rehab    #Transaminitis  - LFT trending down.  - limit Tylenol use, avoid statin and other hepatoxins until LFT normalized  - monitor    #chronic macrocytic anemia  - H/H remains stable  - on MVI, folate and thiamine    #hyponatremia,   - improved    #leukocytosis due to steroid use,   - improving  - non-focal    #Interstitial Lung Disease  #Mixed connective tissue disease  #Suspect critical illness myopathy from prolonged ICU stay, resolved  - CT 11/10 new predominant peripheral reticular opacities, within lung paranchyma; no significant bronchiectasis or honeycombing.   -c/w steroids - taper as scheduled (2 week intervals)   -c/w nebs  -c/w PT/OT  -Mepron for PCP ppx while on steroids   - check ambulatory O2 sat periodically    #PAF  #Non-occlusive right IJ thrombus   -c/w Eliquis  -c/w lopressor 12.5mg po BID  - goal HR for pAfib is <110    #Anxiety  -appreciate psych follow-up  -c/w Xanax PRN    # ?JACEY  # nocturnal desat  - discussed about CPAP use. oxygen use during sleep and PRN. please check nocturnal O2 sat during sleep and document. patient may need OP sleep study. patient is aware. she will speak to her pulm    #  Severe protein-calorie malnutrition.  - nutrition on board    #DVT ppx: Eliquis    d/w rehab team

## 2023-11-19 NOTE — PROGRESS NOTE ADULT - SUBJECTIVE AND OBJECTIVE BOX
Cc: Gait dysfunction    HPI: Patient with no new medical issues overnight.  Pain controlled, no chest pain, no N/V, no Fevers/Chills. No other new ROS  Has been tolerating rehabilitation program.    albuterol/ipratropium for Nebulization 3 milliLiter(s) Nebulizer every 6 hours PRN  ALPRAZolam 0.25 milliGRAM(s) Oral every 6 hours PRN  aluminum hydroxide/magnesium hydroxide/simethicone Suspension 30 milliLiter(s) Oral every 4 hours PRN  ammonium lactate 12% Lotion 1 Application(s) Topical every 12 hours  apixaban 5 milliGRAM(s) Oral every 12 hours  artificial  tears Solution 1 Drop(s) Both EYES every 12 hours  ascorbic acid 500 milliGRAM(s) Oral daily  atovaquone  Suspension 1500 milliGRAM(s) Oral daily  benzocaine/menthol Lozenge 1 Lozenge Oral two times a day PRN  benzonatate 100 milliGRAM(s) Oral three times a day PRN  bisacodyl 5 milliGRAM(s) Oral <User Schedule>  dextrose 5%. 1000 milliLiter(s) IV Continuous <Continuous>  dextrose 5%. 1000 milliLiter(s) IV Continuous <Continuous>  dextrose 50% Injectable 25 Gram(s) IV Push once  dextrose 50% Injectable 12.5 Gram(s) IV Push once  dextrose 50% Injectable 25 Gram(s) IV Push once  dextrose Oral Gel 15 Gram(s) Oral once PRN  folic acid 1 milliGRAM(s) Oral daily  gabapentin 300 milliGRAM(s) Oral three times a day  glucagon  Injectable 1 milliGRAM(s) IntraMuscular once  hydrocortisone hemorrhoidal Suppository 1 Suppository(s) Rectal two times a day  lactobacillus acidophilus 1 Tablet(s) Oral two times a day with meals  loperamide 2 milliGRAM(s) Oral three times a day PRN  melatonin 6 milliGRAM(s) Oral at bedtime  methylPREDNISolone 44 milliGRAM(s) Oral daily  metoprolol tartrate 12.5 milliGRAM(s) Oral two times a day  multivitamin 1 Tablet(s) Oral daily  nystatin Powder 1 Application(s) Topical two times a day  pantoprazole    Tablet 40 milliGRAM(s) Oral before breakfast  polyethylene glycol 3350 17 Gram(s) Oral daily PRN  psyllium Powder 1 Packet(s) Oral daily  SIMETHICONE SOFTGELS 250 milliGRAM(s) 250 milliGRAM(s) Oral three times a day PRN  sodium chloride 0.65% Nasal 1 Spray(s) Both Nostrils every 8 hours PRN  sodium chloride 0.9% lock flush 5 milliLiter(s) IV Push two times a day  zinc oxide 40% Paste 1 Application(s) Topical three times a day      T(C): 36.9 (11-19-23 @ 08:16), Max: 37 (11-18-23 @ 21:32)  HR: 77 (11-19-23 @ 08:16) (76 - 77)  BP: 125/77 (11-19-23 @ 08:16) (125/77 - 131/74)  RR: 16 (11-19-23 @ 08:16) (16 - 16)  SpO2: 96% (11-19-23 @ 08:16) (96% - 96%)    In NAD  HEENT- EOMI  Heart- RRR, S1S2  Lungs- CTA bl.  Abd- + BS, NT  Ext- No calf pain  Neuro- Exam unchanged

## 2023-11-19 NOTE — PROGRESS NOTE ADULT - ASSESSMENT
Imp: Patient with diagnosis of       interstitial lung disease   admitted for comprehensive acute rehabilitation.    Plan:  - Continue PT/OT/SLP  - DVT prophylaxis  - Skin- Turn q2h, check skin daily  - Continue current medications; patient medically stable.   -Active issues-   - Patient is stable to continue current rehabilitation program  - encouraged in chair isometric exercises of the lower limbs

## 2023-11-20 LAB
ALBUMIN SERPL ELPH-MCNC: 2.7 G/DL — LOW (ref 3.3–5)
ALBUMIN SERPL ELPH-MCNC: 2.7 G/DL — LOW (ref 3.3–5)
ALP SERPL-CCNC: 225 U/L — HIGH (ref 40–120)
ALP SERPL-CCNC: 225 U/L — HIGH (ref 40–120)
ALT FLD-CCNC: 99 U/L — HIGH (ref 10–45)
ALT FLD-CCNC: 99 U/L — HIGH (ref 10–45)
ANION GAP SERPL CALC-SCNC: 8 MMOL/L — SIGNIFICANT CHANGE UP (ref 5–17)
ANION GAP SERPL CALC-SCNC: 8 MMOL/L — SIGNIFICANT CHANGE UP (ref 5–17)
AST SERPL-CCNC: 45 U/L — HIGH (ref 10–40)
AST SERPL-CCNC: 45 U/L — HIGH (ref 10–40)
BASOPHILS # BLD AUTO: 0.03 K/UL — SIGNIFICANT CHANGE UP (ref 0–0.2)
BASOPHILS # BLD AUTO: 0.03 K/UL — SIGNIFICANT CHANGE UP (ref 0–0.2)
BASOPHILS NFR BLD AUTO: 0.3 % — SIGNIFICANT CHANGE UP (ref 0–2)
BASOPHILS NFR BLD AUTO: 0.3 % — SIGNIFICANT CHANGE UP (ref 0–2)
BILIRUB SERPL-MCNC: 0.9 MG/DL — SIGNIFICANT CHANGE UP (ref 0.2–1.2)
BILIRUB SERPL-MCNC: 0.9 MG/DL — SIGNIFICANT CHANGE UP (ref 0.2–1.2)
BUN SERPL-MCNC: 17 MG/DL — SIGNIFICANT CHANGE UP (ref 7–23)
BUN SERPL-MCNC: 17 MG/DL — SIGNIFICANT CHANGE UP (ref 7–23)
CALCIUM SERPL-MCNC: 9.2 MG/DL — SIGNIFICANT CHANGE UP (ref 8.4–10.5)
CALCIUM SERPL-MCNC: 9.2 MG/DL — SIGNIFICANT CHANGE UP (ref 8.4–10.5)
CHLORIDE SERPL-SCNC: 102 MMOL/L — SIGNIFICANT CHANGE UP (ref 96–108)
CHLORIDE SERPL-SCNC: 102 MMOL/L — SIGNIFICANT CHANGE UP (ref 96–108)
CO2 SERPL-SCNC: 30 MMOL/L — SIGNIFICANT CHANGE UP (ref 22–31)
CO2 SERPL-SCNC: 30 MMOL/L — SIGNIFICANT CHANGE UP (ref 22–31)
CREAT SERPL-MCNC: 0.5 MG/DL — SIGNIFICANT CHANGE UP (ref 0.5–1.3)
CREAT SERPL-MCNC: 0.5 MG/DL — SIGNIFICANT CHANGE UP (ref 0.5–1.3)
EGFR: 105 ML/MIN/1.73M2 — SIGNIFICANT CHANGE UP
EGFR: 105 ML/MIN/1.73M2 — SIGNIFICANT CHANGE UP
EOSINOPHIL # BLD AUTO: 0.02 K/UL — SIGNIFICANT CHANGE UP (ref 0–0.5)
EOSINOPHIL # BLD AUTO: 0.02 K/UL — SIGNIFICANT CHANGE UP (ref 0–0.5)
EOSINOPHIL NFR BLD AUTO: 0.2 % — SIGNIFICANT CHANGE UP (ref 0–6)
EOSINOPHIL NFR BLD AUTO: 0.2 % — SIGNIFICANT CHANGE UP (ref 0–6)
GLUCOSE SERPL-MCNC: 117 MG/DL — HIGH (ref 70–99)
GLUCOSE SERPL-MCNC: 117 MG/DL — HIGH (ref 70–99)
HCT VFR BLD CALC: 35.5 % — SIGNIFICANT CHANGE UP (ref 34.5–45)
HCT VFR BLD CALC: 35.5 % — SIGNIFICANT CHANGE UP (ref 34.5–45)
HGB BLD-MCNC: 10.9 G/DL — LOW (ref 11.5–15.5)
HGB BLD-MCNC: 10.9 G/DL — LOW (ref 11.5–15.5)
IMM GRANULOCYTES NFR BLD AUTO: 1.5 % — HIGH (ref 0–0.9)
IMM GRANULOCYTES NFR BLD AUTO: 1.5 % — HIGH (ref 0–0.9)
LYMPHOCYTES # BLD AUTO: 1.07 K/UL — SIGNIFICANT CHANGE UP (ref 1–3.3)
LYMPHOCYTES # BLD AUTO: 1.07 K/UL — SIGNIFICANT CHANGE UP (ref 1–3.3)
LYMPHOCYTES # BLD AUTO: 9 % — LOW (ref 13–44)
LYMPHOCYTES # BLD AUTO: 9 % — LOW (ref 13–44)
MCHC RBC-ENTMCNC: 28.5 PG — SIGNIFICANT CHANGE UP (ref 27–34)
MCHC RBC-ENTMCNC: 28.5 PG — SIGNIFICANT CHANGE UP (ref 27–34)
MCHC RBC-ENTMCNC: 30.7 GM/DL — LOW (ref 32–36)
MCHC RBC-ENTMCNC: 30.7 GM/DL — LOW (ref 32–36)
MCV RBC AUTO: 92.7 FL — SIGNIFICANT CHANGE UP (ref 80–100)
MCV RBC AUTO: 92.7 FL — SIGNIFICANT CHANGE UP (ref 80–100)
MONOCYTES # BLD AUTO: 0.73 K/UL — SIGNIFICANT CHANGE UP (ref 0–0.9)
MONOCYTES # BLD AUTO: 0.73 K/UL — SIGNIFICANT CHANGE UP (ref 0–0.9)
MONOCYTES NFR BLD AUTO: 6.1 % — SIGNIFICANT CHANGE UP (ref 2–14)
MONOCYTES NFR BLD AUTO: 6.1 % — SIGNIFICANT CHANGE UP (ref 2–14)
NEUTROPHILS # BLD AUTO: 9.91 K/UL — HIGH (ref 1.8–7.4)
NEUTROPHILS # BLD AUTO: 9.91 K/UL — HIGH (ref 1.8–7.4)
NEUTROPHILS NFR BLD AUTO: 82.9 % — HIGH (ref 43–77)
NEUTROPHILS NFR BLD AUTO: 82.9 % — HIGH (ref 43–77)
NRBC # BLD: 0 /100 WBCS — SIGNIFICANT CHANGE UP (ref 0–0)
NRBC # BLD: 0 /100 WBCS — SIGNIFICANT CHANGE UP (ref 0–0)
PLATELET # BLD AUTO: 326 K/UL — SIGNIFICANT CHANGE UP (ref 150–400)
PLATELET # BLD AUTO: 326 K/UL — SIGNIFICANT CHANGE UP (ref 150–400)
POTASSIUM SERPL-MCNC: 3.9 MMOL/L — SIGNIFICANT CHANGE UP (ref 3.5–5.3)
POTASSIUM SERPL-MCNC: 3.9 MMOL/L — SIGNIFICANT CHANGE UP (ref 3.5–5.3)
POTASSIUM SERPL-SCNC: 3.9 MMOL/L — SIGNIFICANT CHANGE UP (ref 3.5–5.3)
POTASSIUM SERPL-SCNC: 3.9 MMOL/L — SIGNIFICANT CHANGE UP (ref 3.5–5.3)
PROT SERPL-MCNC: 5.5 G/DL — LOW (ref 6–8.3)
PROT SERPL-MCNC: 5.5 G/DL — LOW (ref 6–8.3)
RBC # BLD: 3.83 M/UL — SIGNIFICANT CHANGE UP (ref 3.8–5.2)
RBC # BLD: 3.83 M/UL — SIGNIFICANT CHANGE UP (ref 3.8–5.2)
RBC # FLD: 15.9 % — HIGH (ref 10.3–14.5)
RBC # FLD: 15.9 % — HIGH (ref 10.3–14.5)
SODIUM SERPL-SCNC: 140 MMOL/L — SIGNIFICANT CHANGE UP (ref 135–145)
SODIUM SERPL-SCNC: 140 MMOL/L — SIGNIFICANT CHANGE UP (ref 135–145)
WBC # BLD: 11.94 K/UL — HIGH (ref 3.8–10.5)
WBC # BLD: 11.94 K/UL — HIGH (ref 3.8–10.5)
WBC # FLD AUTO: 11.94 K/UL — HIGH (ref 3.8–10.5)
WBC # FLD AUTO: 11.94 K/UL — HIGH (ref 3.8–10.5)

## 2023-11-20 PROCEDURE — 99232 SBSQ HOSP IP/OBS MODERATE 35: CPT

## 2023-11-20 RX ADMIN — GABAPENTIN 300 MILLIGRAM(S): 400 CAPSULE ORAL at 08:38

## 2023-11-20 RX ADMIN — GABAPENTIN 300 MILLIGRAM(S): 400 CAPSULE ORAL at 21:05

## 2023-11-20 RX ADMIN — APIXABAN 5 MILLIGRAM(S): 2.5 TABLET, FILM COATED ORAL at 21:06

## 2023-11-20 RX ADMIN — Medication 1 TABLET(S): at 08:38

## 2023-11-20 RX ADMIN — Medication 6 MILLIGRAM(S): at 21:05

## 2023-11-20 RX ADMIN — ZINC OXIDE 1 APPLICATION(S): 200 OINTMENT TOPICAL at 21:05

## 2023-11-20 RX ADMIN — ATOVAQUONE 1500 MILLIGRAM(S): 750 SUSPENSION ORAL at 12:20

## 2023-11-20 RX ADMIN — SODIUM CHLORIDE 5 MILLILITER(S): 9 INJECTION INTRAMUSCULAR; INTRAVENOUS; SUBCUTANEOUS at 08:42

## 2023-11-20 RX ADMIN — PANTOPRAZOLE SODIUM 40 MILLIGRAM(S): 20 TABLET, DELAYED RELEASE ORAL at 08:38

## 2023-11-20 RX ADMIN — Medication 1 TABLET(S): at 21:06

## 2023-11-20 RX ADMIN — APIXABAN 5 MILLIGRAM(S): 2.5 TABLET, FILM COATED ORAL at 08:39

## 2023-11-20 RX ADMIN — Medication 1 DROP(S): at 08:39

## 2023-11-20 RX ADMIN — Medication 44 MILLIGRAM(S): at 08:39

## 2023-11-20 RX ADMIN — Medication 1 TABLET(S): at 12:20

## 2023-11-20 RX ADMIN — ZINC OXIDE 1 APPLICATION(S): 200 OINTMENT TOPICAL at 13:51

## 2023-11-20 RX ADMIN — Medication 12.5 MILLIGRAM(S): at 08:38

## 2023-11-20 RX ADMIN — SODIUM CHLORIDE 5 MILLILITER(S): 9 INJECTION INTRAMUSCULAR; INTRAVENOUS; SUBCUTANEOUS at 17:09

## 2023-11-20 RX ADMIN — Medication 1 MILLIGRAM(S): at 12:20

## 2023-11-20 RX ADMIN — GABAPENTIN 300 MILLIGRAM(S): 400 CAPSULE ORAL at 15:52

## 2023-11-20 RX ADMIN — Medication 500 MILLIGRAM(S): at 12:20

## 2023-11-20 RX ADMIN — Medication 1 APPLICATION(S): at 08:40

## 2023-11-20 RX ADMIN — ZINC OXIDE 1 APPLICATION(S): 200 OINTMENT TOPICAL at 08:40

## 2023-11-20 RX ADMIN — NYSTATIN CREAM 1 APPLICATION(S): 100000 CREAM TOPICAL at 08:40

## 2023-11-20 RX ADMIN — Medication 12.5 MILLIGRAM(S): at 21:05

## 2023-11-20 NOTE — PROGRESS NOTE ADULT - SUBJECTIVE AND OBJECTIVE BOX
SUBJECTIVE/ROS:  Patient seen and examined, at bed side, partner present  Reports good night rest, tolerating diet  Reports frequent flatulence, previously responsive to gas x, advised to recommence it (patient supply)  Informed that insurance has declined approval for his continue acute rehab treatment and a Peer to Peer interview with her insurance is due tomorrow  Update will be given to her afterwards      ROS--No chest or abd pain, no palpitations nausea or vomiting  Tingling/numbness of fingers, non progressive  LBM 11/19    Therapy.--  Improvement with strength at knee--now antigravity  Observed ambulating with manual WC, self propelling it > 100ft  Sustained improvement of upper extremity function and strenght    Vital Signs Last 24 Hrs  T(C): 36.7 (20 Nov 2023 08:32), Max: 36.8 (19 Nov 2023 20:02)  T(F): 98 (20 Nov 2023 08:32), Max: 98.3 (19 Nov 2023 20:02)  HR: 85 (20 Nov 2023 08:32) (73 - 85)  BP: 107/70 (20 Nov 2023 08:32) (107/70 - 115/73)  RR: 16 (20 Nov 2023 08:32) (16 - 16)  SpO2: 95% (20 Nov 2023 08:32) (95% - 96%)  O2 Parameters below as of 20 Nov 2023 08:32  Patient On (Oxygen Delivery Method): nasal cannula  O2 Flow (L/min): 1      PHYSICAL EXAM:  Gen -  Comfortable,  at bedside -normal oxy sat and subsequently self ambulating WC  Pulm - clear  Cardiovascular - HS i and II intermittently irregular  Abdomen - Soft, non tender bowel sounds present  Extremities - edema to b/l LE, no calf tenderness, LUE Med line    Neuro-  Cognitive - awake, alert, fully oriented, engaging, appropriate,   Light tough sensation diffusely reduced on fingers and dorsal foot, and over right lower jaw, localized area approx 2 cm, no skin changes noted, non progressive   Motor -                    LEFT    UE - 4/5                    RIGHT UE - 4/5                     LEFT    LE - 3+/5, distally and 3/5 proximal                    RIGHT LE - Ankles PF 3/5 ,DF 2/5, Knee ext 3/5 Hips limited by pain Rt hip due to ulcer at ECHO access site   Sensory - decreased light tough sensation fingers and sole of foot, difusely  MSK: improvement with motor strength  Psychiatric - Mood stable, Affect WNL  Skin -- , stage 2 pressure injury to right buttock 4 x1 and left buttock 3x1, stage 2 pressure injury ti right inner thigh 0.2 x 0.2, right groin wound 10.5 x 2.0, Left groin wound 0.6x1.8.0.1cm    MMT--Able to contract B/L upper back muscles, EF/EE 4/5 distally, knee Est 3/5      MEDICATIONS  (STANDING):  ammonium lactate 12% Lotion 1 Application(s) Topical every 12 hours  apixaban 5 milliGRAM(s) Oral every 12 hours  artificial  tears Solution 1 Drop(s) Both EYES every 12 hours  ascorbic acid 500 milliGRAM(s) Oral daily  atovaquone  Suspension 1500 milliGRAM(s) Oral daily  bisacodyl 5 milliGRAM(s) Oral <User Schedule>  dextrose 5%. 1000 milliLiter(s) (50 mL/Hr) IV Continuous <Continuous>  dextrose 5%. 1000 milliLiter(s) (100 mL/Hr) IV Continuous <Continuous>  dextrose 50% Injectable 12.5 Gram(s) IV Push once  dextrose 50% Injectable 25 Gram(s) IV Push once  dextrose 50% Injectable 25 Gram(s) IV Push once  folic acid 1 milliGRAM(s) Oral daily  gabapentin 300 milliGRAM(s) Oral three times a day  glucagon  Injectable 1 milliGRAM(s) IntraMuscular once  hydrocortisone hemorrhoidal Suppository 1 Suppository(s) Rectal two times a day  lactobacillus acidophilus 1 Tablet(s) Oral two times a day with meals  melatonin 6 milliGRAM(s) Oral at bedtime  methylPREDNISolone 44 milliGRAM(s) Oral daily  metoprolol tartrate 12.5 milliGRAM(s) Oral two times a day  multivitamin 1 Tablet(s) Oral daily  nystatin Powder 1 Application(s) Topical two times a day  pantoprazole    Tablet 40 milliGRAM(s) Oral before breakfast  psyllium Powder 1 Packet(s) Oral daily  sodium chloride 0.9% lock flush 5 milliLiter(s) IV Push two times a day  zinc oxide 40% Paste 1 Application(s) Topical three times a day    MEDICATIONS  (PRN):  albuterol/ipratropium for Nebulization 3 milliLiter(s) Nebulizer every 6 hours PRN Shortness of Breath and/or Wheezing  ALPRAZolam 0.25 milliGRAM(s) Oral every 6 hours PRN Anxiety  aluminum hydroxide/magnesium hydroxide/simethicone Suspension 30 milliLiter(s) Oral every 4 hours PRN Dyspepsia  benzocaine/menthol Lozenge 1 Lozenge Oral two times a day PRN Sore Throat  benzonatate 100 milliGRAM(s) Oral three times a day PRN Cough  dextrose Oral Gel 15 Gram(s) Oral once PRN Blood Glucose LESS THAN 70 milliGRAM(s)/deciliter  loperamide 2 milliGRAM(s) Oral three times a day PRN Diarrhea  polyethylene glycol 3350 17 Gram(s) Oral daily PRN Constipation  SIMETHICONE SOFTGELS 250 milliGRAM(s) 250 milliGRAM(s) Oral three times a day PRN for gas  sodium chloride 0.65% Nasal 1 Spray(s) Both Nostrils every 8 hours PRN Nasal Congestion      SUBJECTIVE/ROS:  Patient seen and examined, at bed side, partner present  Reports good night rest, tolerating diet  Reports frequent flatulence, previously responsive to gas x, advised to recommence it (patient supply)  Informed that insurance has declined approval for his continue acute rehab treatment and a Peer to Peer interview with her insurance is due tomorrow  Update will be given to her afterwards      ROS--No chest or abd pain, no palpitations nausea or vomiting  Tingling/numbness of fingers, non progressive  LBM 11/19    Therapy.--  Improvement with strength at knee--now antigravity  Observed ambulating with manual WC, self propelling it > 100ft  Sustained improvement of upper extremity function and strenght    Vital Signs Last 24 Hrs  T(C): 36.7 (20 Nov 2023 08:32), Max: 36.8 (19 Nov 2023 20:02)  T(F): 98 (20 Nov 2023 08:32), Max: 98.3 (19 Nov 2023 20:02)  HR: 85 (20 Nov 2023 08:32) (73 - 85)  BP: 107/70 (20 Nov 2023 08:32) (107/70 - 115/73)  RR: 16 (20 Nov 2023 08:32) (16 - 16)  SpO2: 95% (20 Nov 2023 08:32) (95% - 96%)  O2 Parameters below as of 20 Nov 2023 08:32  Patient On (Oxygen Delivery Method): nasal cannula  O2 Flow (L/min): 1      PHYSICAL EXAM:  Gen -  Comfortable,  at bedside -normal oxy sat and subsequently self ambulating WC  Pulm - clear  Cardiovascular - HS i and II intermittently irregular  Abdomen - Soft, non tender bowel sounds present  Extremities - edema to b/l LE, no calf tenderness, LUE Med line    Neuro-  Cognitive - awake, alert, fully oriented, engaging, appropriate,   Light tough sensation diffusely reduced on fingers and dorsal foot, and over right lower jaw, localized area approx 2 cm, no skin changes noted, non progressive   Motor -                    LEFT    UE - 4/5                    RIGHT UE - 4/5                     LEFT    LE - 3+/5, distally and 3/5 proximal                    RIGHT LE - Ankles PF 3/5 ,DF 2/5, Knee ext 3/5 Hips limited by pain Rt hip due to ulcer at ECHO access site   Sensory - decreased light tough sensation fingers and sole of foot, difusely  MSK: improvement with motor strength  Psychiatric - Mood stable, Affect WNL  Skin -- , stage 2 pressure injury to right buttock 4 x1 and left buttock 3x1, stage 2 pressure injury ti right inner thigh 0.2 x 0.2, right groin wound 10.5 x 2.0, Left groin wound 0.6x1.8.0.1cm    MMT--Able to contract B/L upper back muscles, EF/EE 4/5 distally, knee Est 3/5      RECENT LABS/IMAGING                        10.9   11.94 )-----------( 326      ( 20 Nov 2023 08:48 )             35.5     11-20    140  |  102  |  17  ----------------------------<  117<H>  3.9   |  30  |  0.50    Ca    9.2      20 Nov 2023 08:48    TPro  5.5<L>  /  Alb  2.7<L>  /  TBili  0.9  /  DBili  x   /  AST  45<H>  /  ALT  99<H>  /  AlkPhos  225<H>  11-20      Urinalysis Basic - ( 20 Nov 2023 08:48 )    Color: x / Appearance: x / SG: x / pH: x  Gluc: 117 mg/dL / Ketone: x  / Bili: x / Urobili: x   Blood: x / Protein: x / Nitrite: x   Leuk Esterase: x / RBC: x / WBC x   Sq Epi: x / Non Sq Epi: x / Bacteria: x              MEDICATIONS  (STANDING):  ammonium lactate 12% Lotion 1 Application(s) Topical every 12 hours  apixaban 5 milliGRAM(s) Oral every 12 hours  artificial  tears Solution 1 Drop(s) Both EYES every 12 hours  ascorbic acid 500 milliGRAM(s) Oral daily  atovaquone  Suspension 1500 milliGRAM(s) Oral daily  bisacodyl 5 milliGRAM(s) Oral <User Schedule>  dextrose 5%. 1000 milliLiter(s) (50 mL/Hr) IV Continuous <Continuous>  dextrose 5%. 1000 milliLiter(s) (100 mL/Hr) IV Continuous <Continuous>  dextrose 50% Injectable 12.5 Gram(s) IV Push once  dextrose 50% Injectable 25 Gram(s) IV Push once  dextrose 50% Injectable 25 Gram(s) IV Push once  folic acid 1 milliGRAM(s) Oral daily  gabapentin 300 milliGRAM(s) Oral three times a day  glucagon  Injectable 1 milliGRAM(s) IntraMuscular once  hydrocortisone hemorrhoidal Suppository 1 Suppository(s) Rectal two times a day  lactobacillus acidophilus 1 Tablet(s) Oral two times a day with meals  melatonin 6 milliGRAM(s) Oral at bedtime  methylPREDNISolone 44 milliGRAM(s) Oral daily  metoprolol tartrate 12.5 milliGRAM(s) Oral two times a day  multivitamin 1 Tablet(s) Oral daily  nystatin Powder 1 Application(s) Topical two times a day  pantoprazole    Tablet 40 milliGRAM(s) Oral before breakfast  psyllium Powder 1 Packet(s) Oral daily  sodium chloride 0.9% lock flush 5 milliLiter(s) IV Push two times a day  zinc oxide 40% Paste 1 Application(s) Topical three times a day    MEDICATIONS  (PRN):  albuterol/ipratropium for Nebulization 3 milliLiter(s) Nebulizer every 6 hours PRN Shortness of Breath and/or Wheezing  ALPRAZolam 0.25 milliGRAM(s) Oral every 6 hours PRN Anxiety  aluminum hydroxide/magnesium hydroxide/simethicone Suspension 30 milliLiter(s) Oral every 4 hours PRN Dyspepsia  benzocaine/menthol Lozenge 1 Lozenge Oral two times a day PRN Sore Throat  benzonatate 100 milliGRAM(s) Oral three times a day PRN Cough  dextrose Oral Gel 15 Gram(s) Oral once PRN Blood Glucose LESS THAN 70 milliGRAM(s)/deciliter  loperamide 2 milliGRAM(s) Oral three times a day PRN Diarrhea  polyethylene glycol 3350 17 Gram(s) Oral daily PRN Constipation  SIMETHICONE SOFTGELS 250 milliGRAM(s) 250 milliGRAM(s) Oral three times a day PRN for gas  sodium chloride 0.65% Nasal 1 Spray(s) Both Nostrils every 8 hours PRN Nasal Congestion

## 2023-11-20 NOTE — PROGRESS NOTE ADULT - ASSESSMENT
Assessment/Plan:  ALIZA FOLEY is a 62 year old female with PMH of pAFIB and sciatica; who presented to [facility] ED with SOB, and was found to have groundglass opacities and interstitial lung disease. She was treated with empiric Vancomycin and Zosyn. She underwent a bronchoscopy with bronchoalveolar lavage and pulse steroids. She was given IV diuretics for increased pulmonary congestion, but her respiratory status continued to worsen.  On 9/13, she was transferred to [facility] for ECMO requirements. She was further treated with Plasmapheresis, Rituximab and high-dose steroids, with significant improvement. Her overall hospital course was complicated by thrombocytopenia (s/p several platelet transfusions), leukocytosis (infectious workup entirely negative- s/p Vanco and Ceftriaxone), AFIB with RVR (resolved with Metoprolol), dysphagia (requiring NGT), transaminitis (secondary to acute illness and hypotension),  non-occlusive RIJ DVT (started on Lovenox). Patient now admitted for a multidisciplinary rehab program. 10-05-23 @ 13:18    * labs unremarkable d/w patient   * Sustained functional improvement including progress with Wt bearing LE and improvement with ability at transfers, concentrate on prox LE muscle strengthening  * Continue progress with oxy tapering, and tapering steroid regimen  * Switch bowel agent, and topical creams to prn as requested by patient    * Peer to Peer interview with her insurance tomorrow    #Interstitial Lung Disease and Mixed connective tissue disease  - Gait Instability, ADL impairments and Functional impairments: start Comprehensive Rehab Program of PT/OT/SLP - 3 hours a day, 5 days a week  - P&O as needed   - Non-specific Interstitial Lung Disease  - Requiring ECMO   - Improvement s/p Plasmapheresis, Rituximab and high- dose steroids   - Albuterol/Ipratropium Nebulizer every 6 hours  - Mepron 1500mg daily  - PO Medrol ( due to liver dysfunction): Plan will be to taper by ~20% every 2 weeks    Medrol   64mg x 2 weeks   56mg x 2 weeks  44mg x 2 weeks   36mg x 2 weeks - Follow up Outpatient   - Plan to wean 02 as tolerated, Continue tapering oxygen,  -  CT Chest noted 11/10---Resp f/u review  11/14, appreciated  They reports sustained improvement, clinical (normal oxy sats on R/A, sustained progress with oxy tapering), and radiological (no acute findings on recent CT Chest, rather residual chronic infiltrative diesease noted, with recommendation to repeat CT after Acute rehab treatment     * Posttnasal drip--ENT review 11/7, declined scope, continue flonase     #pAFIB/Rt Ij thrombus  - Metoprolol 25mg BID and lovenox  --- Eliquis 5mg BID - tolerating well     # Chronic Tinnitus   - ENT review and recs appreciate, Patient already made ENT appointment for f/u    # Lymphedema both legs -chronic and Rt IJ thrombus  - ACE Wrap BL LEs  - BL LE doppler negative for DVT  - BL UE doppler with chronic thrombotic changes in RIJ     #Transaminitis --LFT Down trending   - Secondary to acute illness and hypotension at [facility]  - Outpatient follow up     * Leucocytosis--steroid related, continue monitoring   #Neuropathy  - Gabapentin 300 mg BID, will consider increasing dose, increase to TID 11/10  --Work on motor strengthening, priority for UE as preferred by patient   - Vit b12 >2,000 in 9/2023  --Will consider Rhuematology f/u if needed    #Sleep/Mood  - Melatonin 6mg at HS  - Psych rec Xanax 0.25mg PRN    #Skin  - Skin: Left lower abdomen ruptured blister 3.0 x 1.0, stage 2 pressure injury to right buttock 4 x1 and left buttock 3x1, stage 2 pressure injury ti right inner thigh 0.2 x 0.2, right groin wound 10.5 x 2.0,   Wound care review, Left groin wound 11/15--- 0.6x1.8.0.1cm, no slough, healthy base  -- MAD to skin folds- Nystatin to submammary and abdominal pannus BID  -- MAD to L groin- cleanse with NS, Triad cream daily  -- Mad to R groin- Nystatin powder daily   -- R groin wound-- aquacel packing, Triad to periwound, Allevyn foam- daily and PRN   - Pressure injury/Skin: OOB to Chair, PT/OT   - Offload pressure, low air loss support surfaces, T&P every 2 hours   --Wound care consult-10/9 -Multiple wounds--perineal and buttock/sacral, recs appreciated   --Suggest Continue treatments as below:   Twice daily & PRN Normal Saline Cleanse of abdominal pannus & breast folds, perineal, Left & Right inner buttock erosions.  Pat thoroughly dry.   Apply Desitin generously twice daily (& PRN) to perineal, buttocks, and left groin erosions, leave open to air.    Apply Nystatin powder to abdominal pannus and breast folds.  Suggest use of Interdry cloths in folds to 'wick' moisture.  Suggest daily Normal Saline Cleanse of right groin surgical wound, pat dry.  Apply Cavillon film barrier to intact periwound skin.  Place Aquacell strip over open area, cover with foam dressing.  - Wound care following     #Pain Mgmt   - Tylenol PRN   - Gabapentin 300mg at HS     #GI/Bowel Mgmt   - Pantoprazole  - Senna  --on hold due to recent Loose BM  - PRN: Maalox   - Stool count  --Lactobacillus BID   -- AB XR mod stool burden on transverse colon 10/23--still has mod stool burden, but moving bowel appropriately  - S/p enema and lactulose    # Alternating bowel function --continue probiotic, lactobacillus   * Leucocytosis probably steroid related and partly due to her Mixed Connective Tissue Disease, will continue monitoring     #/Bladder Mgmt   -Spontaneously voiding   - UA (11/3) negative    #FEN   #Dysphagia  - Diet - Minced & Moist  [CC]    - Dysphaiga- SLP evaluation     #Health maintenance  - Folic Acid   - MVI  - Ocean nasal spray    # Difficulty with access to peripheral veins-- Blood draws from Left arm Medline     #Precautions / PROPHYLAXIS:   - Falls  - ortho: Weight bearing status: WBAT   - Lungs: Aspiration, Incentive Spirometer   - DVT PPX: Eliquis 5 mg BID     11/13 --Labs CBC mild anemia, normal renal function, LFT elevated, non progressive     IDT conference on 11/14  Social Work: Await peer to peer with CM. Provided private hire list. Lives with spouse in [place] 6 months of year. DC to apt in [place] with elevator, no steps. Supportive spouse.   Function--Supervision for eating and grooming upper body, mod assistance with lower body dressing, max assist with toileting, but patient making sustained effort engaging  Improving motor strength at ankles and knees.   Transfers and ambulation--marked progress  Able to self propell her manual wheel chair with minimum assistance, increasing distance, Able to transfer from higher to lower level, max assistance with slide board  Ambulating in light gate max assist x 2 up to 40 ft  Barriers--need for max assistance for transfers  DME--Host bed, Damaris lift, WC  Est d 11/27--sustained motor improvement of upper extremity function, recently making progress with LE motor function progressing from distal to proxima  (Overall improvement--clinical (healing of Rt groin wound, no new findings on Chest CT as noted by pulmonary team. Functional--improvement with transfers, ambulation and LE strength)  Medical and pulmonary teams monitoring leucocytosis and oxy tapering  Est dc revised to 11/27      OUTPATIENT/FOLLOW UP:    Trae Whitney  Pulmonary Disease  [address], [address] [place] [address]  Phone: [phone]  Fax: [phone]  Follow Up Time: 2 weeks    Ryanne Allen  Rheumatology  [address] [address]  Phone: [phone]  Fax: [phone]  Follow Up Time: 1 week    Kyle Pierceell  Internal Medicine  [address] [place] [address], [address]  [place] [address]  Phone: [phone]  Fax: [phone]  Follow Up Time: 2 weeks    Addy Powers  Pulmonary Disease  [address] [facility], [address]  [place], [place] [id number]  Phone: [phone]  Fax: [phone]  Follow Up Time:     Kwame Hawley  Critical Care Medicine  [address] [facility], [address]  [place] [address]  Phone: [phone]  Fax: [phone]  Follow Up Time:     Neena Borrego  Rheumatology  [address] [place] [address], [address]  [place] [address]  Phone: [phone]  Fax: [phone]  Follow Up Time:    [facility]  [address]  Scheduled Appointment: 09/13/2023  2:40 PM      Assessment/Plan:  ALIZA FOLEY is a 62 year old female with PMH of pAFIB and sciatica; who presented to [facility] ED with SOB, and was found to have groundglass opacities and interstitial lung disease. She was treated with empiric Vancomycin and Zosyn. She underwent a bronchoscopy with bronchoalveolar lavage and pulse steroids. She was given IV diuretics for increased pulmonary congestion, but her respiratory status continued to worsen.  On 9/13, she was transferred to [facility] for ECMO requirements. She was further treated with Plasmapheresis, Rituximab and high-dose steroids, with significant improvement. Her overall hospital course was complicated by thrombocytopenia (s/p several platelet transfusions), leukocytosis (infectious workup entirely negative- s/p Vanco and Ceftriaxone), AFIB with RVR (resolved with Metoprolol), dysphagia (requiring NGT), transaminitis (secondary to acute illness and hypotension),  non-occlusive RIJ DVT (started on Lovenox). Patient now admitted for a multidisciplinary rehab program. 10-05-23 @ 13:18    * labs unremarkable d/w patient   * Sustained functional improvement including progress with Wt bearing LE and improvement with ability at transfers, concentrate on prox LE muscle strengthening  * Continue progress with oxy tapering, and tapering steroid regimen  * Switch bowel agent, and topical creams to prn as requested by patient    * Peer to Peer interview with her insurance tomorrow    #Interstitial Lung Disease and Mixed connective tissue disease  - Gait Instability, ADL impairments and Functional impairments: start Comprehensive Rehab Program of PT/OT/SLP - 3 hours a day, 5 days a week  - P&O as needed   - Non-specific Interstitial Lung Disease  - Requiring ECMO   - Improvement s/p Plasmapheresis, Rituximab and high- dose steroids   - Albuterol/Ipratropium Nebulizer every 6 hours  - Mepron 1500mg daily  - PO Medrol ( due to liver dysfunction): Plan will be to taper by ~20% every 2 weeks    Medrol   64mg x 2 weeks   56mg x 2 weeks  44mg x 2 weeks   36mg x 2 weeks - Follow up Outpatient   - Plan to wean 02 as tolerated, Continue tapering oxygen,  -  CT Chest noted 11/10---Resp f/u review  11/14, appreciated  They reports sustained improvement, clinical (normal oxy sats on R/A, sustained progress with oxy tapering), and radiological (no acute findings on recent CT Chest, rather residual chronic infiltrative diesease noted, with recommendation to repeat CT after Acute rehab treatment     * Posttnasal drip--ENT review 11/7, declined scope, continue flonase     #pAFIB/Rt Ij thrombus  - Metoprolol 25mg BID and lovenox  --- Eliquis 5mg BID - tolerating well     # Chronic Tinnitus   - ENT review and recs appreciate, Patient already made ENT appointment for f/u    # Lymphedema both legs -chronic and Rt IJ thrombus  - ACE Wrap BL LEs  - BL LE doppler negative for DVT  - BL UE doppler with chronic thrombotic changes in RIJ     #Transaminitis --LFT Down trending   - Secondary to acute illness and hypotension at [facility]  - Outpatient follow up     * Leucocytosis--steroid related, continue monitoring   #Neuropathy  - Gabapentin 300 mg BID, will consider increasing dose, increase to TID 11/10  --Work on motor strengthening, priority for UE as preferred by patient   - Vit b12 >2,000 in 9/2023  --Will consider Rhuematology f/u if needed    #Sleep/Mood  - Melatonin 6mg at HS  - Psych rec Xanax 0.25mg PRN    #Skin  - Skin: Left lower abdomen ruptured blister 3.0 x 1.0, stage 2 pressure injury to right buttock 4 x1 and left buttock 3x1, stage 2 pressure injury ti right inner thigh 0.2 x 0.2, right groin wound 10.5 x 2.0,   Wound care review, Left groin wound 11/15--- 0.6x1.8.0.1cm, no slough, healthy base  -- MAD to skin folds- Nystatin to submammary and abdominal pannus BID  -- MAD to L groin- cleanse with NS, Triad cream daily  -- Mad to R groin- Nystatin powder daily   -- R groin wound-- aquacel packing, Triad to periwound, Allevyn foam- daily and PRN   - Pressure injury/Skin: OOB to Chair, PT/OT   - Offload pressure, low air loss support surfaces, T&P every 2 hours   --Wound care consult-10/9 -Multiple wounds--perineal and buttock/sacral, recs appreciated   --Suggest Continue treatments as below:   Twice daily & PRN Normal Saline Cleanse of abdominal pannus & breast folds, perineal, Left & Right inner buttock erosions.  Pat thoroughly dry.   Apply Desitin generously twice daily (& PRN) to perineal, buttocks, and left groin erosions, leave open to air.    Apply Nystatin powder to abdominal pannus and breast folds.  Suggest use of Interdry cloths in folds to 'wick' moisture.  Suggest daily Normal Saline Cleanse of right groin surgical wound, pat dry.  Apply Cavillon film barrier to intact periwound skin.  Place Aquacell strip over open area, cover with foam dressing.  - Wound care following     #Pain Mgmt   - Tylenol PRN   - Gabapentin 300mg at HS     #GI/Bowel Mgmt   - Pantoprazole  - Senna  --on hold due to recent Loose BM  - PRN: Maalox   - Stool count  --Lactobacillus BID   -- AB XR mod stool burden on transverse colon 10/23--still has mod stool burden, but moving bowel appropriately  - S/p enema and lactulose    # Alternating bowel function --continue probiotic, lactobacillus   * Leucocytosis probably steroid related and partly due to her Mixed Connective Tissue Disease, will continue monitoring     #/Bladder Mgmt   -Spontaneously voiding   - UA (11/3) negative    #FEN   #Dysphagia  - Diet - Minced & Moist  [CC]    - Dysphaiga- SLP evaluation     #Health maintenance  - Folic Acid   - MVI  - Ocean nasal spray    # Difficulty with access to peripheral veins-- Blood draws from Left arm Medline     #Precautions / PROPHYLAXIS:   - Falls  - ortho: Weight bearing status: WBAT   - Lungs: Aspiration, Incentive Spirometer   - DVT PPX: Eliquis 5 mg BID     11/20 --Labs CBC mild anemia, normal renal function, LFT elevated,downtrending     IDT conference on 11/14  Social Work: Await peer to peer with CM. Provided private hire list. Lives with spouse in [place] 6 months of year. DC to apt in [place] with elevator, no steps. Supportive spouse.   Function--Supervision for eating and grooming upper body, mod assistance with lower body dressing, max assist with toileting, but patient making sustained effort engaging  Improving motor strength at ankles and knees.   Transfers and ambulation--marked progress  Able to self propell her manual wheel chair with minimum assistance, increasing distance, Able to transfer from higher to lower level, max assistance with slide board  Ambulating in light gate max assist x 2 up to 40 ft  Barriers--need for max assistance for transfers  DME--Host bed, Damaris lift, WC  Est d 11/27--sustained motor improvement of upper extremity function, recently making progress with LE motor function progressing from distal to proxima  (Overall improvement--clinical (healing of Rt groin wound, no new findings on Chest CT as noted by pulmonary team. Functional--improvement with transfers, ambulation and LE strength)  Medical and pulmonary teams monitoring leucocytosis and oxy tapering  Est dc revised to 11/27      OUTPATIENT/FOLLOW UP:    Trae Whitney  Pulmonary Disease  [address], [address] [place] [address]  Phone: [phone]  Fax: [phone]  Follow Up Time: 2 weeks    Ryanne Allen  Rheumatology  [address] [address]  Phone: [phone]  Fax: [phone]  Follow Up Time: 1 week    Kyle Pierce  Internal Medicine  [address] [place] [address], [address]  [place] [address]  Phone: [phone]  Fax: [phone]  Follow Up Time: 2 weeks    Addy Powers  Pulmonary Disease  [address] [facility], [address]  [place], [place] [address]  Phone: [phone]  Fax: [phone]  Follow Up Time:     Kwame Hawley  Critical Care Medicine  [address] [facility], [address]  [place] [address]  Phone: [phone]  Fax: [phone]  Follow Up Time:     Neena Borrego  Rheumatology  [address] [place] [address], [address]  [place] [address]  Phone: [phone]  Fax: [phone]  Follow Up Time:    [facility]  [address]  Scheduled Appointment: 09/13/2023  2:40 PM

## 2023-11-20 NOTE — PROGRESS NOTE ADULT - SUBJECTIVE AND OBJECTIVE BOX
Medicine Progress Note    Patient is a 62y old  Female who presents with a chief complaint of Interstitial Lung Disease (20 Nov 2023 11:30)      SUBJECTIVE / OVERNIGHT EVENTS:  seen and examined  Chart reviewed  No overnight events  Limb weakness improving with therapy  BM+  No pain  No complaints    ADDITIONAL REVIEW OF SYSTEMS:  denied fever/chills/CP/SOB/cough/palpitation/dizziness/abdominal pian/nausea/vomiting/diarrhoea/constipation/dysuria/leg or calf pain/headaches.all other ROS neg    MEDICATIONS  (STANDING):  ammonium lactate 12% Lotion 1 Application(s) Topical every 12 hours  apixaban 5 milliGRAM(s) Oral every 12 hours  artificial  tears Solution 1 Drop(s) Both EYES every 12 hours  ascorbic acid 500 milliGRAM(s) Oral daily  atovaquone  Suspension 1500 milliGRAM(s) Oral daily  bisacodyl 5 milliGRAM(s) Oral <User Schedule>  dextrose 5%. 1000 milliLiter(s) (50 mL/Hr) IV Continuous <Continuous>  dextrose 5%. 1000 milliLiter(s) (100 mL/Hr) IV Continuous <Continuous>  dextrose 50% Injectable 25 Gram(s) IV Push once  dextrose 50% Injectable 25 Gram(s) IV Push once  dextrose 50% Injectable 12.5 Gram(s) IV Push once  folic acid 1 milliGRAM(s) Oral daily  gabapentin 300 milliGRAM(s) Oral three times a day  glucagon  Injectable 1 milliGRAM(s) IntraMuscular once  hydrocortisone hemorrhoidal Suppository 1 Suppository(s) Rectal two times a day  lactobacillus acidophilus 1 Tablet(s) Oral two times a day with meals  melatonin 6 milliGRAM(s) Oral at bedtime  methylPREDNISolone 44 milliGRAM(s) Oral daily  metoprolol tartrate 12.5 milliGRAM(s) Oral two times a day  multivitamin 1 Tablet(s) Oral daily  nystatin Powder 1 Application(s) Topical two times a day  pantoprazole    Tablet 40 milliGRAM(s) Oral before breakfast  psyllium Powder 1 Packet(s) Oral daily  sodium chloride 0.9% lock flush 5 milliLiter(s) IV Push two times a day  zinc oxide 40% Paste 1 Application(s) Topical three times a day    MEDICATIONS  (PRN):  albuterol/ipratropium for Nebulization 3 milliLiter(s) Nebulizer every 6 hours PRN Shortness of Breath and/or Wheezing  ALPRAZolam 0.25 milliGRAM(s) Oral every 6 hours PRN Anxiety  aluminum hydroxide/magnesium hydroxide/simethicone Suspension 30 milliLiter(s) Oral every 4 hours PRN Dyspepsia  benzocaine/menthol Lozenge 1 Lozenge Oral two times a day PRN Sore Throat  benzonatate 100 milliGRAM(s) Oral three times a day PRN Cough  dextrose Oral Gel 15 Gram(s) Oral once PRN Blood Glucose LESS THAN 70 milliGRAM(s)/deciliter  loperamide 2 milliGRAM(s) Oral three times a day PRN Diarrhea  polyethylene glycol 3350 17 Gram(s) Oral daily PRN Constipation  SIMETHICONE SOFTGELS 250 milliGRAM(s) 250 milliGRAM(s) Oral three times a day PRN for gas  sodium chloride 0.65% Nasal 1 Spray(s) Both Nostrils every 8 hours PRN Nasal Congestion    CAPILLARY BLOOD GLUCOSE        I&O's Summary      PHYSICAL EXAM:  Vital Signs Last 24 Hrs  T(C): 36.7 (20 Nov 2023 08:32), Max: 36.8 (19 Nov 2023 20:02)  T(F): 98 (20 Nov 2023 08:32), Max: 98.3 (19 Nov 2023 20:02)  HR: 85 (20 Nov 2023 08:32) (73 - 85)  BP: 107/70 (20 Nov 2023 08:32) (107/70 - 115/73)  BP(mean): --  RR: 16 (20 Nov 2023 08:32) (16 - 16)  SpO2: 95% (20 Nov 2023 08:32) (95% - 96%)    Parameters below as of 20 Nov 2023 08:32  Patient On (Oxygen Delivery Method): nasal cannula  O2 Flow (L/min): 1      GENERAL: Not in distress. Alert    HEENT: clear conjuctiva, MMM. no pallor or icterus  CARDIOVASCULAR: RRR S1, S2. No murmur/rubs/gallop  LUNGS: BLAE reduced, no rales, no wheezing, no rhonchi.    ABDOMEN: ND. Soft,  NT, no guarding / rebound / rigidity. BS normoactive  BACK: No spine tenderness.  EXTREMITIES: no edema. no leg or calf TP.  SKIN: warm and dry  PSYCHIATRIC: Calm.  No agitation.  CNS: AAO. moves limbs, follows commands    LABS:                        10.9   11.94 )-----------( 326      ( 20 Nov 2023 08:48 )             35.5     11-20    140  |  102  |  17  ----------------------------<  117<H>  3.9   |  30  |  0.50    Ca    9.2      20 Nov 2023 08:48    TPro  5.5<L>  /  Alb  2.7<L>  /  TBili  0.9  /  DBili  x   /  AST  45<H>  /  ALT  99<H>  /  AlkPhos  225<H>  11-20          Urinalysis Basic - ( 20 Nov 2023 08:48 )    Color: x / Appearance: x / SG: x / pH: x  Gluc: 117 mg/dL / Ketone: x  / Bili: x / Urobili: x   Blood: x / Protein: x / Nitrite: x   Leuk Esterase: x / RBC: x / WBC x   Sq Epi: x / Non Sq Epi: x / Bacteria: x        COVID-19 PCR: NotDetec (05 Oct 2023 20:17)  SARS-CoV-2: NotDetec (30 Sep 2023 12:52)  SARS-CoV-2: NotDetec (13 Sep 2023 17:55)  SARS-CoV-2: NotDetec (10 Sep 2023 11:24)  SARS-CoV-2: NotDetec (26 Aug 2023 12:55)      RADIOLOGY & ADDITIONAL TESTS:  Imaging from Last 24 Hours:    Electrocardiogram/QTc Interval:    COORDINATION OF CARE:  Care Discussed with Consultants/Other Providers:

## 2023-11-20 NOTE — PROGRESS NOTE ADULT - ASSESSMENT
62F with AF, hx sciatica, newly diagnosed ILD (likely associated with MCTD +ARIANA, +RNP, +Sm) with exacerbation requiring ECMO / plasmapharesis / Rituximab / steriods, now at acute rehab    #Transaminitis  - LFT trending down.  - limit Tylenol use, avoid statin and other hepatoxic meds  - monitor    #chronic macrocytic anemia  - H/H remains stable  - on MVI, folate and thiamine    #hyponatremia,   - improved    #leukocytosis due to steroid use,   - improving  - non-focal    #Interstitial Lung Disease  #Mixed connective tissue disease  #Suspect critical illness myopathy from prolonged ICU stay, resolved  - CT 11/10 new predominant peripheral reticular opacities, within lung paranchyma; no significant bronchiectasis or honeycombing.   -c/w steroids - taper as scheduled (2 week intervals)   -c/w nebs  -c/w PT/OT  -Mepron for PCP ppx while on steroids   - check ambulatory O2 sat periodically    #PAF  #Non-occlusive right IJ thrombus   -c/w Eliquis  -c/w lopressor 12.5mg po BID  - goal HR for pAfib is <110    #Anxiety  -appreciate psych follow-up  -c/w Xanax PRN    # ?JACEY  # nocturnal desat  - discussed about CPAP use. oxygen use during sleep and PRN. please check nocturnal O2 sat during sleep and document. patient may need OP sleep study. patient is aware. she will speak to her pulm    #  Severe protein-calorie malnutrition.  - nutrition on board    #DVT ppx: Eliquis    d/w rehab team

## 2023-11-20 NOTE — CHART NOTE - NSCHARTNOTEFT_GEN_A_CORE
Nutrition Follow Up Note  Source: Medical Record [X] Patient [X] Family [ ]         Diet: Consistent carbohydrate / no snacks, Glucerna daily  Pt tolerating diet with good PO intake, eating >75% of meals per nursing flow sheets. Pt reports good appetite - requests chocolate Glucerna (provides 220 kcal, 10 g protein/serving). Reviewed current diet & obtained food preferences to optimize PO intake. Pt c/o having a bowel movement after each meal.    Enteral/Parenteral Nutrition: N/A    Current Weight: 256.6 lbs (10-5)    Pertinent Medications: MEDICATIONS  (STANDING):  ammonium lactate 12% Lotion 1 Application(s) Topical every 12 hours  apixaban 5 milliGRAM(s) Oral every 12 hours  artificial  tears Solution 1 Drop(s) Both EYES every 12 hours  ascorbic acid 500 milliGRAM(s) Oral daily  atovaquone  Suspension 1500 milliGRAM(s) Oral daily  bisacodyl 5 milliGRAM(s) Oral <User Schedule>  dextrose 5%. 1000 milliLiter(s) (50 mL/Hr) IV Continuous <Continuous>  dextrose 5%. 1000 milliLiter(s) (100 mL/Hr) IV Continuous <Continuous>  dextrose 50% Injectable 25 Gram(s) IV Push once  dextrose 50% Injectable 25 Gram(s) IV Push once  dextrose 50% Injectable 12.5 Gram(s) IV Push once  folic acid 1 milliGRAM(s) Oral daily  gabapentin 300 milliGRAM(s) Oral three times a day  glucagon  Injectable 1 milliGRAM(s) IntraMuscular once  hydrocortisone hemorrhoidal Suppository 1 Suppository(s) Rectal two times a day  lactobacillus acidophilus 1 Tablet(s) Oral two times a day with meals  melatonin 6 milliGRAM(s) Oral at bedtime  methylPREDNISolone 44 milliGRAM(s) Oral daily  metoprolol tartrate 12.5 milliGRAM(s) Oral two times a day  multivitamin 1 Tablet(s) Oral daily  nystatin Powder 1 Application(s) Topical two times a day  pantoprazole    Tablet 40 milliGRAM(s) Oral before breakfast  psyllium Powder 1 Packet(s) Oral daily  sodium chloride 0.9% lock flush 5 milliLiter(s) IV Push two times a day  zinc oxide 40% Paste 1 Application(s) Topical three times a day    MEDICATIONS  (PRN):  albuterol/ipratropium for Nebulization 3 milliLiter(s) Nebulizer every 6 hours PRN Shortness of Breath and/or Wheezing  ALPRAZolam 0.25 milliGRAM(s) Oral every 6 hours PRN Anxiety  aluminum hydroxide/magnesium hydroxide/simethicone Suspension 30 milliLiter(s) Oral every 4 hours PRN Dyspepsia  benzocaine/menthol Lozenge 1 Lozenge Oral two times a day PRN Sore Throat  benzonatate 100 milliGRAM(s) Oral three times a day PRN Cough  dextrose Oral Gel 15 Gram(s) Oral once PRN Blood Glucose LESS THAN 70 milliGRAM(s)/deciliter  loperamide 2 milliGRAM(s) Oral three times a day PRN Diarrhea  polyethylene glycol 3350 17 Gram(s) Oral daily PRN Constipation  SIMETHICONE SOFTGELS 250 milliGRAM(s) 250 milliGRAM(s) Oral three times a day PRN for gas  sodium chloride 0.65% Nasal 1 Spray(s) Both Nostrils every 8 hours PRN Nasal Congestion      Pertinent Labs:  11-20 Na140 mmol/L Glu 117 mg/dL<H> K+ 3.9 mmol/L Cr  0.50 mg/dL BUN 17 mg/dL 11-20 Alb 2.7 g/dL<L>        Skin: surgical incision, Moisture associated dermatitis per nursing flow sheets.     Edema: No edema per nursing flow sheets     Last BM: on 11-19 Per nursing flowsheets     Estimated Needs:   [X] No Change since Previous Assessment  [ ] Recalculated:     Previous Nutrition Diagnosis:   1. Severe malnutrition  2. Increased nutrient needs    Nutrition Diagnosis is [X] Ongoing  - addressed with MVI, Glucerna daily     New Nutrition Diagnosis: [X] Not Applicable  [ ] Inadequate Protein Energy Intake   [ ] Inadequate Oral Intake   [ ] Excessive Energy Intake   [ ] Increased Nutrient Needs   [ ] Obesity   [ ] Altered GI Function   [ ] Unintended Weight Loss   [ ] Food & Nutrition Related Knowledge Deficit  [ ] Limited Adherence to nutrition related recommendations   [ ] Malnutrition      Interventions:   1. Recommend continuing with current plan of care  2. Encourage PO intake  3. Ongoing diet education    Monitoring & Evaluation:   [X] Weights   [X] PO Intake   [X] Follow Up (Per Protocol)  [X] Tolerance to Diet Prescription   [X] Other: Labs     RD Remains Available.  Desire Livingston RD

## 2023-11-21 PROCEDURE — 99233 SBSQ HOSP IP/OBS HIGH 50: CPT

## 2023-11-21 RX ORDER — HYDROCORTISONE 1 %
1 OINTMENT (GRAM) TOPICAL
Refills: 0 | Status: DISCONTINUED | OUTPATIENT
Start: 2023-11-21 | End: 2023-12-20

## 2023-11-21 RX ORDER — PSYLLIUM SEED (WITH DEXTROSE)
1 POWDER (GRAM) ORAL DAILY
Refills: 0 | Status: DISCONTINUED | OUTPATIENT
Start: 2023-11-21 | End: 2023-12-01

## 2023-11-21 RX ORDER — ALPRAZOLAM 0.25 MG
0.25 TABLET ORAL EVERY 6 HOURS
Refills: 0 | Status: DISCONTINUED | OUTPATIENT
Start: 2023-11-21 | End: 2023-11-28

## 2023-11-21 RX ORDER — SOD,AMMONIUM,POTASSIUM LACTATE
1 CREAM (GRAM) TOPICAL EVERY 12 HOURS
Refills: 0 | Status: DISCONTINUED | OUTPATIENT
Start: 2023-11-21 | End: 2023-12-20

## 2023-11-21 RX ADMIN — Medication 1 TABLET(S): at 21:08

## 2023-11-21 RX ADMIN — ATOVAQUONE 1500 MILLIGRAM(S): 750 SUSPENSION ORAL at 21:07

## 2023-11-21 RX ADMIN — Medication 12.5 MILLIGRAM(S): at 21:06

## 2023-11-21 RX ADMIN — Medication 1 TABLET(S): at 21:06

## 2023-11-21 RX ADMIN — NYSTATIN CREAM 1 APPLICATION(S): 100000 CREAM TOPICAL at 22:58

## 2023-11-21 RX ADMIN — APIXABAN 5 MILLIGRAM(S): 2.5 TABLET, FILM COATED ORAL at 21:06

## 2023-11-21 RX ADMIN — Medication 1 DROP(S): at 21:08

## 2023-11-21 RX ADMIN — GABAPENTIN 300 MILLIGRAM(S): 400 CAPSULE ORAL at 08:17

## 2023-11-21 RX ADMIN — Medication 1 TABLET(S): at 08:17

## 2023-11-21 RX ADMIN — GABAPENTIN 300 MILLIGRAM(S): 400 CAPSULE ORAL at 14:56

## 2023-11-21 RX ADMIN — NYSTATIN CREAM 1 APPLICATION(S): 100000 CREAM TOPICAL at 11:33

## 2023-11-21 RX ADMIN — SODIUM CHLORIDE 5 MILLILITER(S): 9 INJECTION INTRAMUSCULAR; INTRAVENOUS; SUBCUTANEOUS at 21:15

## 2023-11-21 RX ADMIN — ZINC OXIDE 1 APPLICATION(S): 200 OINTMENT TOPICAL at 13:19

## 2023-11-21 RX ADMIN — Medication 1 MILLIGRAM(S): at 21:06

## 2023-11-21 RX ADMIN — ZINC OXIDE 1 APPLICATION(S): 200 OINTMENT TOPICAL at 21:08

## 2023-11-21 RX ADMIN — Medication 6 MILLIGRAM(S): at 21:05

## 2023-11-21 RX ADMIN — Medication 500 MILLIGRAM(S): at 21:07

## 2023-11-21 RX ADMIN — Medication 12.5 MILLIGRAM(S): at 08:18

## 2023-11-21 RX ADMIN — PANTOPRAZOLE SODIUM 40 MILLIGRAM(S): 20 TABLET, DELAYED RELEASE ORAL at 08:17

## 2023-11-21 RX ADMIN — ZINC OXIDE 1 APPLICATION(S): 200 OINTMENT TOPICAL at 11:33

## 2023-11-21 RX ADMIN — SODIUM CHLORIDE 5 MILLILITER(S): 9 INJECTION INTRAMUSCULAR; INTRAVENOUS; SUBCUTANEOUS at 11:32

## 2023-11-21 RX ADMIN — APIXABAN 5 MILLIGRAM(S): 2.5 TABLET, FILM COATED ORAL at 08:17

## 2023-11-21 RX ADMIN — GABAPENTIN 300 MILLIGRAM(S): 400 CAPSULE ORAL at 21:05

## 2023-11-21 RX ADMIN — Medication 44 MILLIGRAM(S): at 08:18

## 2023-11-21 NOTE — PROGRESS NOTE ADULT - SUBJECTIVE AND OBJECTIVE BOX
SUBJECTIVE/ROS:    Patient seen and examined at bedside this AM with Dr. ROBLES, partner present  Admits to sleeping well.  Discussed plan for Peer to Peer interview with insurance today with updates to follow.   Patient requesting medication times to be consolidated.     ROS--No chest or abd pain, no palpitations nausea or vomiting  Tingling/numbness of fingers, non progressive  LBM 11/21    Therapy.--  Improvement with strength at knee--now antigravity  Observed ambulating with manual WC, self propelling it > 100ft  Sustained improvement of upper extremity function and strength      Vital Signs Last 24 Hrs  T(C): 36.7 (20 Nov 2023 21:01), Max: 36.7 (20 Nov 2023 21:01)  T(F): 98.1 (20 Nov 2023 21:01), Max: 98.1 (20 Nov 2023 21:01)  HR: 83 (20 Nov 2023 21:01) (83 - 83)  BP: 124/62 (20 Nov 2023 21:01) (124/62 - 124/62)  BP(mean): --  RR: 16 (20 Nov 2023 21:01) (16 - 16)  SpO2: 94% (20 Nov 2023 21:01) (94% - 94%)      PHYSICAL EXAM:  Gen -  Comfortable,  at bedside -normal oxy sat and subsequently self ambulating WC  Pulm - clear  Cardiovascular - HS i and II intermittently irregular  Abdomen - Soft, non tender bowel sounds present  Extremities - edema to b/l LE, no calf tenderness, LUE Med line    Neuro-  Cognitive - awake, alert, fully oriented, engaging, appropriate,   Light tough sensation diffusely reduced on fingers and dorsal foot, and over right lower jaw, localized area approx 2 cm, no skin changes noted, non progressive   Motor -                    LEFT    UE - 4/5                    RIGHT UE - 4/5                     LEFT    LE - 3+/5, distally and 3/5 proximal                    RIGHT LE - Ankles PF 3/5 ,DF 2/5, Knee ext 3/5 Hips limited by pain Rt hip due to ulcer at ECHO access site   Sensory - decreased light tough sensation fingers and sole of foot, difusely  MSK: improvement with motor strength  Psychiatric - Mood stable, Affect WNL  Skin -- , stage 2 pressure injury to right buttock 4 x1 and left buttock 3x1, stage 2 pressure injury ti right inner thigh 0.2 x 0.2, right groin wound 10.5 x 2.0, Left groin wound 0.6x1.8.0.1cm    MMT--Able to contract B/L upper back muscles, EF/EE 4/5 distally, knee Est 3/5        LABS:                        10.9   11.94 )-----------( 326      ( 20 Nov 2023 08:48 )             35.5     11-20    140  |  102  |  17  ----------------------------<  117<H>  3.9   |  30  |  0.50    Ca    9.2      20 Nov 2023 08:48    TPro  5.5<L>  /  Alb  2.7<L>  /  TBili  0.9  /  DBili  x   /  AST  45<H>  /  ALT  99<H>  /  AlkPhos  225<H>  11-20      Urinalysis Basic - ( 20 Nov 2023 08:48 )    Color: x / Appearance: x / SG: x / pH: x  Gluc: 117 mg/dL / Ketone: x  / Bili: x / Urobili: x   Blood: x / Protein: x / Nitrite: x   Leuk Esterase: x / RBC: x / WBC x   Sq Epi: x / Non Sq Epi: x / Bacteria: x        MEDICATIONS  (STANDING):  apixaban 5 milliGRAM(s) Oral every 12 hours  artificial  tears Solution 1 Drop(s) Both EYES every 12 hours  ascorbic acid 500 milliGRAM(s) Oral daily  atovaquone  Suspension 1500 milliGRAM(s) Oral daily  dextrose 5%. 1000 milliLiter(s) (50 mL/Hr) IV Continuous <Continuous>  dextrose 5%. 1000 milliLiter(s) (100 mL/Hr) IV Continuous <Continuous>  dextrose 50% Injectable 12.5 Gram(s) IV Push once  dextrose 50% Injectable 25 Gram(s) IV Push once  dextrose 50% Injectable 25 Gram(s) IV Push once  folic acid 1 milliGRAM(s) Oral daily  gabapentin 300 milliGRAM(s) Oral three times a day  glucagon  Injectable 1 milliGRAM(s) IntraMuscular once  lactobacillus acidophilus 1 Tablet(s) Oral two times a day with meals  melatonin 6 milliGRAM(s) Oral at bedtime  methylPREDNISolone 44 milliGRAM(s) Oral daily  metoprolol tartrate 12.5 milliGRAM(s) Oral two times a day  multivitamin 1 Tablet(s) Oral daily  nystatin Powder 1 Application(s) Topical two times a day  pantoprazole    Tablet 40 milliGRAM(s) Oral before breakfast  sodium chloride 0.9% lock flush 5 milliLiter(s) IV Push two times a day  zinc oxide 40% Paste 1 Application(s) Topical three times a day    MEDICATIONS  (PRN):  albuterol/ipratropium for Nebulization 3 milliLiter(s) Nebulizer every 6 hours PRN Shortness of Breath and/or Wheezing  ALPRAZolam 0.25 milliGRAM(s) Oral every 6 hours PRN Anxiety  aluminum hydroxide/magnesium hydroxide/simethicone Suspension 30 milliLiter(s) Oral every 4 hours PRN Dyspepsia  ammonium lactate 12% Lotion 1 Application(s) Topical every 12 hours PRN BL feet dryness  benzocaine/menthol Lozenge 1 Lozenge Oral two times a day PRN Sore Throat  benzonatate 100 milliGRAM(s) Oral three times a day PRN Cough  bisacodyl 5 milliGRAM(s) Oral <User Schedule> PRN Constipation  dextrose Oral Gel 15 Gram(s) Oral once PRN Blood Glucose LESS THAN 70 milliGRAM(s)/deciliter  hydrocortisone hemorrhoidal Suppository 1 Suppository(s) Rectal two times a day PRN Hemorrhoids  loperamide 2 milliGRAM(s) Oral three times a day PRN Diarrhea  polyethylene glycol 3350 17 Gram(s) Oral daily PRN Constipation  psyllium Powder 1 Packet(s) Oral daily PRN Fiber  SIMETHICONE SOFTGELS 250 milliGRAM(s),SIMETHICONE SOFTGELS 250 milliGRAM(s) 250 milliGRAM(s) Oral three times a day PRN for gas  sodium chloride 0.65% Nasal 1 Spray(s) Both Nostrils every 8 hours PRN Nasal Congestion    SUBJECTIVE/ROS:    Patient seen and examined at bedside this AM with Dr. López, partner present  Admits to sleeping well.  Reports regular BMs, requested bowel agents, topical creams and some other meds to be switched to prn  Other changes to dozing regimen of her meds requested, which we are facilitating  Discussed plan for Peer to Peer interview with insurance today with updates to follow.   Patient requesting medication times to be consolidated.     ROS--No chest or abd pain, no palpitations nausea or vomiting  Tingling/numbness of fingers, non progressive  LBM 11/21    Therapy.--  Improvement with strength at knee--now antigravity  Observed ambulating with manual WC, self propelling it > 100ft  Sustained improvement of upper extremity function and strength      Vital Signs Last 24 Hrs  T(C): 36.7 (20 Nov 2023 21:01), Max: 36.7 (20 Nov 2023 21:01)  T(F): 98.1 (20 Nov 2023 21:01), Max: 98.1 (20 Nov 2023 21:01)  HR: 83 (20 Nov 2023 21:01) (83 - 83)  BP: 124/62 (20 Nov 2023 21:01) (124/62 - 124/62)  BP(mean): --  RR: 16 (20 Nov 2023 21:01) (16 - 16)  SpO2: 94% (20 Nov 2023 21:01) (94% - 94%)      PHYSICAL EXAM:  Gen -  Comfortable,  at bedside -normal oxy sat and subsequently self ambulating WC  Pulm - clear  Cardiovascular - HS i and II intermittently irregular  Abdomen - Soft, non tender bowel sounds present  Extremities - edema to b/l LE, no calf tenderness, LUE Med line    Neuro-  Cognitive - awake, alert, fully oriented, engaging, appropriate,   Light tough sensation diffusely reduced on fingers and dorsal foot, and over right lower jaw, localized area approx 2 cm, no skin changes noted, non progressive   Motor -                    LEFT    UE - 4/5                    RIGHT UE - 4/5                     LEFT    LE - 3+/5, distally and 3/5 proximal                    RIGHT LE - Ankles PF 3/5 ,DF 2/5, Knee ext 3/5 Hips limited by pain Rt hip due to ulcer at ECHO access site   Sensory - decreased light tough sensation fingers and sole of foot, difusely  MSK: improvement with motor strength  Psychiatric - Mood stable, Affect WNL  Skin -- , stage 2 pressure injury to right buttock 4 x1 and left buttock 3x1, stage 2 pressure injury ti right inner thigh 0.2 x 0.2, right groin wound 10.5 x 2.0, Left groin wound 0.6x1.8.0.1cm    MMT--Able to contract B/L upper back muscles, EF/EE 4/5 distally, knee Est 3/5        LABS:                        10.9   11.94 )-----------( 326      ( 20 Nov 2023 08:48 )             35.5     11-20    140  |  102  |  17  ----------------------------<  117<H>  3.9   |  30  |  0.50    Ca    9.2      20 Nov 2023 08:48    TPro  5.5<L>  /  Alb  2.7<L>  /  TBili  0.9  /  DBili  x   /  AST  45<H>  /  ALT  99<H>  /  AlkPhos  225<H>  11-20      Urinalysis Basic - ( 20 Nov 2023 08:48 )    Color: x / Appearance: x / SG: x / pH: x  Gluc: 117 mg/dL / Ketone: x  / Bili: x / Urobili: x   Blood: x / Protein: x / Nitrite: x   Leuk Esterase: x / RBC: x / WBC x   Sq Epi: x / Non Sq Epi: x / Bacteria: x        MEDICATIONS  (STANDING):  apixaban 5 milliGRAM(s) Oral every 12 hours  artificial  tears Solution 1 Drop(s) Both EYES every 12 hours  ascorbic acid 500 milliGRAM(s) Oral daily  atovaquone  Suspension 1500 milliGRAM(s) Oral daily  dextrose 5%. 1000 milliLiter(s) (50 mL/Hr) IV Continuous <Continuous>  dextrose 5%. 1000 milliLiter(s) (100 mL/Hr) IV Continuous <Continuous>  dextrose 50% Injectable 12.5 Gram(s) IV Push once  dextrose 50% Injectable 25 Gram(s) IV Push once  dextrose 50% Injectable 25 Gram(s) IV Push once  folic acid 1 milliGRAM(s) Oral daily  gabapentin 300 milliGRAM(s) Oral three times a day  glucagon  Injectable 1 milliGRAM(s) IntraMuscular once  lactobacillus acidophilus 1 Tablet(s) Oral two times a day with meals  melatonin 6 milliGRAM(s) Oral at bedtime  methylPREDNISolone 44 milliGRAM(s) Oral daily  metoprolol tartrate 12.5 milliGRAM(s) Oral two times a day  multivitamin 1 Tablet(s) Oral daily  nystatin Powder 1 Application(s) Topical two times a day  pantoprazole    Tablet 40 milliGRAM(s) Oral before breakfast  sodium chloride 0.9% lock flush 5 milliLiter(s) IV Push two times a day  zinc oxide 40% Paste 1 Application(s) Topical three times a day    MEDICATIONS  (PRN):  albuterol/ipratropium for Nebulization 3 milliLiter(s) Nebulizer every 6 hours PRN Shortness of Breath and/or Wheezing  ALPRAZolam 0.25 milliGRAM(s) Oral every 6 hours PRN Anxiety  aluminum hydroxide/magnesium hydroxide/simethicone Suspension 30 milliLiter(s) Oral every 4 hours PRN Dyspepsia  ammonium lactate 12% Lotion 1 Application(s) Topical every 12 hours PRN BL feet dryness  benzocaine/menthol Lozenge 1 Lozenge Oral two times a day PRN Sore Throat  benzonatate 100 milliGRAM(s) Oral three times a day PRN Cough  bisacodyl 5 milliGRAM(s) Oral <User Schedule> PRN Constipation  dextrose Oral Gel 15 Gram(s) Oral once PRN Blood Glucose LESS THAN 70 milliGRAM(s)/deciliter  hydrocortisone hemorrhoidal Suppository 1 Suppository(s) Rectal two times a day PRN Hemorrhoids  loperamide 2 milliGRAM(s) Oral three times a day PRN Diarrhea  polyethylene glycol 3350 17 Gram(s) Oral daily PRN Constipation  psyllium Powder 1 Packet(s) Oral daily PRN Fiber  SIMETHICONE SOFTGELS 250 milliGRAM(s),SIMETHICONE SOFTGELS 250 milliGRAM(s) 250 milliGRAM(s) Oral three times a day PRN for gas  sodium chloride 0.65% Nasal 1 Spray(s) Both Nostrils every 8 hours PRN Nasal Congestion

## 2023-11-21 NOTE — PROGRESS NOTE ADULT - ASSESSMENT
Physical Examination:  GENERAL:                Alert, Oriented, No acute distress.     PULM:                     Bilateral air entry,  poor air entry at bases No Rales, No Rhonchi, No Wheezing  CVS:                         S1, S2,  No Murmur  ABD:                        Soft, nondistended, nontender, normoactive bowel sounds,   EXT:                        Trace non pitting edema, nontender, No Cyanosis or Clubbing   NEURO:                  Alert, oriented, interactive, bilateral upper and lower extremity weakness   PSYC:                      Calm, + Insight.      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 S/p pulse steroids, PLEX (9/15, 9/18, 9/20) and Rituximab (9/16 & 10/3). Course c/b severe leukopenia s/p filgastrim, shock liver with slow to resolved hyperbilirubinemia, RIJ DVT, oropharyngeal dysphagia, and recurrent SVT. Repeat CT chest on 9/30 with markedly improved bilateral groundglass opacities with resolved lung consolidations. Now in acute rehab on 4 lpm NC oxygen and tapering dose of medrol.     Problem List:  1. Interstitial lung disease   2. Mixed connective tissue disease   3. Acute hypoxia  4. RIJ DVT     Plan:  - will get overnight pulse oximetry to evaluate accuracy of low o2, suspect will need outpatient home psg to see if has kelsi, continue 1 l qhs for now  - Stable respiratory status,Room air now   - Cont. Methylprednisolone PO, taper as per rheumatology recs. from [facility]   - Continue atovaquone for PCP prophylaxis  - S/p Rituximab 9/16 and 10/3  - S/p PLEX during MICU stay  - Repeat CT scan reviewed, findings do not sugest acute infection but chronic infiltrative disease that is improving, no acute intervention required, will recommend out patient pulmonary follow up with repeat CT after rehab stay.  - No evidence of active infection, monitor off antibiotics, noted increased WBC, unsure if from steroids. monitor closely  - Cont. anticoagulation for DVT associated with ECMO cannula  - Consider incentive spirometry   - Care per primary team  - Discussed with spouse at bedside   - PT OOB as tolerated  - Outpatient pulmonary and rheumatology follow up upon discharge     Physical Examination:  GENERAL:                Alert, Oriented, No acute distress.     PULM:                     Bilateral air entry,  poor air entry at bases No Rales, No Rhonchi, No Wheezing  CVS:                         S1, S2,  No Murmur  ABD:                        Soft, nondistended, nontender, normoactive bowel sounds,   EXT:                        Trace non pitting edema, nontender, No Cyanosis or Clubbing   NEURO:                  Alert, oriented, interactive, bilateral upper and lower extremity weakness   PSYC:                      Calm, + Insight.      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 S/p pulse steroids, PLEX (9/15, 9/18, 9/20) and Rituximab (9/16 & 10/3). Course c/b severe leukopenia s/p filgastrim, shock liver with slow to resolved hyperbilirubinemia, RIJ DVT, oropharyngeal dysphagia, and recurrent SVT. Repeat CT chest on 9/30 with markedly improved bilateral groundglass opacities with resolved lung consolidations. Now in acute rehab on 4 lpm NC oxygen and tapering dose of medrol.     Problem List:  1. Interstitial lung disease   2. Mixed connective tissue disease   3. Acute hypoxia  4. RIJ DVT     Plan:  - will get overnight pulse oximetry to evaluate accuracy of low o2, suspect will need outpatient home psg to see if has kelsi, continue 1 l qhs for now  - Stable respiratory status,Room air now   - Cont. Methylprednisolone PO, taper as per rheumatology recs. from [facility]   - Continue atovaquone for PCP prophylaxis  - S/p Rituximab 9/16 and 10/3  - S/p PLEX during MICU stay  - Repeat CT scan reviewed, findings do not suggest acute infection but chronic infiltrative disease that is improving, no acute intervention required, will recommend out patient pulmonary follow up with repeat CT after rehab stay.  - No evidence of active infection, monitor off antibiotics, noted increased WBC, unsure if from steroids. monitor closely  - Cont. anticoagulation for DVT associated with ECMO cannula  - Consider incentive spirometry   - Care per primary team  - Discussed with spouse at bedside   - PT OOB as tolerated  - Outpatient pulmonary and rheumatology follow up upon discharge

## 2023-11-21 NOTE — PROGRESS NOTE ADULT - ASSESSMENT
Assessment/Plan:  ALIZA FOLEY is a 62 year old female with PMH of pAFIB and sciatica; who presented to [facility] ED with SOB, and was found to have groundglass opacities and interstitial lung disease. She was treated with empiric Vancomycin and Zosyn. She underwent a bronchoscopy with bronchoalveolar lavage and pulse steroids. She was given IV diuretics for increased pulmonary congestion, but her respiratory status continued to worsen.  On 9/13, she was transferred to [facility] for ECMO requirements. She was further treated with Plasmapheresis, Rituximab and high-dose steroids, with significant improvement. Her overall hospital course was complicated by thrombocytopenia (s/p several platelet transfusions), leukocytosis (infectious workup entirely negative- s/p Vanco and Ceftriaxone), AFIB with RVR (resolved with Metoprolol), dysphagia (requiring NGT), transaminitis (secondary to acute illness and hypotension),  non-occlusive RIJ DVT (started on Lovenox). Patient now admitted for a multidisciplinary rehab program. 10-05-23 @ 13:18    * Sustained functional improvement including progress with Wt bearing LE and improvement with ability at transfers, concentrate on prox LE muscle strengthening  * Peer to Peer interview with insurance today   * Medication timing consolidation per patient request     #Interstitial Lung Disease and Mixed connective tissue disease  - Gait Instability, ADL impairments and Functional impairments: start Comprehensive Rehab Program of PT/OT/SLP - 3 hours a day, 5 days a week  - P&O as needed   - Non-specific Interstitial Lung Disease  - Requiring ECMO   - Improvement s/p Plasmapheresis, Rituximab and high- dose steroids   - Albuterol/Ipratropium Nebulizer every 6 hours  - Mepron 1500mg daily  - PO Medrol ( due to liver dysfunction): Plan will be to taper by ~20% every 2 weeks    Medrol   64mg x 2 weeks   56mg x 2 weeks  44mg x 2 weeks   36mg x 2 weeks - Follow up Outpatient   - Plan to wean 02 as tolerated, Continue tapering oxygen,  -  CT Chest noted 11/10---Resp f/u review  11/14, appreciated  They reports sustained improvement, clinical (normal oxy sats on R/A, sustained progress with oxy tapering), and radiological (no acute findings on recent CT Chest, rather residual chronic infiltrative diesease noted, with recommendation to repeat CT after Acute rehab treatment     #Posttnasal drip--ENT review 11/7, declined scope, continue flonase     #pAFIB/Rt Ij thrombus  - Metoprolol 25mg BID and lovenox  --- Eliquis 5mg BID - tolerating well     # Chronic Tinnitus   - ENT review and recs appreciate, Patient already made ENT appointment for f/u    # Lymphedema both legs -chronic and Rt IJ thrombus  - ACE Wrap BL LEs  - BL LE doppler negative for DVT  - BL UE doppler with chronic thrombotic changes in RIJ     #Transaminitis --LFT Down trending   - Secondary to acute illness and hypotension at [facility]  - Outpatient follow up     #Leucocytosis--steroid related, continue monitoring     #Neuropathy  - Gabapentin 300 mg BID, will consider increasing dose, increase to TID 11/10  --Work on motor strengthening, priority for UE as preferred by patient   - Vit b12 >2,000 in 9/2023  --Will consider Rhuematology f/u if needed    #Sleep/Mood  - Melatonin 6mg at HS  - Psych rec Xanax 0.25mg PRN    #Skin  - Skin: Left lower abdomen ruptured blister 3.0 x 1.0, stage 2 pressure injury to right buttock 4 x1 and left buttock 3x1, stage 2 pressure injury ti right inner thigh 0.2 x 0.2, right groin wound 10.5 x 2.0,   Wound care review, Left groin wound 11/15--- 0.6x1.8.0.1cm, no slough, healthy base  -- MAD to skin folds- Nystatin to submammary and abdominal pannus BID  -- MAD to L groin- cleanse with NS, Triad cream daily  -- Mad to R groin- Nystatin powder daily   -- R groin wound-- aquacel packing, Triad to periwound, Allevyn foam- daily and PRN   - Pressure injury/Skin: OOB to Chair, PT/OT   - Offload pressure, low air loss support surfaces, T&P every 2 hours   --Wound care consult-10/9 -Multiple wounds--perineal and buttock/sacral, recs appreciated   --Suggest Continue treatments as below:   Twice daily & PRN Normal Saline Cleanse of abdominal pannus & breast folds, perineal, Left & Right inner buttock erosions.  Pat thoroughly dry.   Apply Desitin generously twice daily (& PRN) to perineal, buttocks, and left groin erosions, leave open to air.    Apply Nystatin powder to abdominal pannus and breast folds.  Suggest use of Interdry cloths in folds to 'wick' moisture.  Suggest daily Normal Saline Cleanse of right groin surgical wound, pat dry.  Apply Cavillon film barrier to intact periwound skin.  Place Aquacell strip over open area, cover with foam dressing.  - Wound care following     #Pain Mgmt   - Tylenol PRN   - Gabapentin 300mg at HS     #GI/Bowel Mgmt   - Pantoprazole  - Senna  --on hold due to recent Loose BM  - PRN: Maalox   - Stool count  --Lactobacillus BID   -- AB XR mod stool burden on transverse colon 10/23--still has mod stool burden, but moving bowel appropriately  - S/p enema and lactulose    # Alternating bowel function --continue probiotic, lactobacillus   * Leucocytosis probably steroid related and partly due to her Mixed Connective Tissue Disease, will continue monitoring     #/Bladder Mgmt   -Spontaneously voiding   - UA (11/3) negative    #FEN   #Dysphagia  - Diet - Minced & Moist  [CC]    - Dysphagia- SLP evaluation     #Health maintenance  - Folic Acid   - MVI  - Ocean nasal spray    # Difficulty with access to peripheral veins-- Blood draws from Left arm Medline     #Precautions / PROPHYLAXIS:   - Falls  - ortho: Weight bearing status: WBAT   - Lungs: Aspiration, Incentive Spirometer   - DVT PPX: Eliquis 5 mg BID   ----------------------------------------  11/20 --Labs CBC mild anemia, normal renal function, LFT elevated, downtrending   ----------------------------------------  IDT conference on 11/14  Social Work: Await peer to peer with CM. Provided private hire list. Lives with spouse in [place] 6 months of year. DC to apt in [place] with elevator, no steps. Supportive spouse.   Function--Supervision for eating and grooming upper body, mod assistance with lower body dressing, max assist with toileting, but patient making sustained effort engaging  Improving motor strength at ankles and knees.   Transfers and ambulation--marked progress  Able to self propell her manual wheel chair with minimum assistance, increasing distance, Able to transfer from higher to lower level, max assistance with slide board  Ambulating in light gate max assist x 2 up to 40 ft  Barriers--need for max assistance for transfers  DME--Host bed, Damaris lift, WC  Est d 11/27--sustained motor improvement of upper extremity function, recently making progress with LE motor function progressing from distal to proxima  (Overall improvement--clinical (healing of Rt groin wound, no new findings on Chest CT as noted by pulmonary team. Functional--improvement with transfers, ambulation and LE strength)  Medical and pulmonary teams monitoring leucocytosis and oxy tapering  Est dc revised to 11/27  ----------------------------------------  OUTPATIENT/FOLLOW UP:    Trae Whitney  Pulmonary Disease  [address], [address] [place] [address]  Phone: [phone]  Fax: [phone]  Follow Up Time: 2 weeks    Ryanne Allen  Rheumatology  [address] [address]  Phone: [phone]  Fax: [phone]  Follow Up Time: 1 week    Kyle Pierce  Internal Medicine  [address] [place] [address], [address]  [place] [address]  Phone: [phone]  Fax: [phone]  Follow Up Time: 2 weeks    Addy Powers  Pulmonary Disease  [address] [facility], [address]  [place], [place] [address]  Phone: [phone]  Fax: [phone]  Follow Up Time:     Kwame Hawley  Critical Care Medicine  [address] [facility], [address]  [place] [address]  Phone: [phone]  Fax: [phone]  Follow Up Time:     Neena Borrego  Rheumatology  [address] [facility], [address]  [place] [address]  Phone: [phone]  Fax: [phone]  Follow Up Time:    [facility]  [facility] [address]  Scheduled Appointment: 09/13/2023  2:40 PM      Assessment/Plan:  ALIZA FOLEY is a 62 year old female with PMH of pAFIB and sciatica; who presented to [facility] ED with SOB, and was found to have groundglass opacities and interstitial lung disease. She was treated with empiric Vancomycin and Zosyn. She underwent a bronchoscopy with bronchoalveolar lavage and pulse steroids. She was given IV diuretics for increased pulmonary congestion, but her respiratory status continued to worsen.  On 9/13, she was transferred to [facility] for ECMO requirements. She was further treated with Plasmapheresis, Rituximab and high-dose steroids, with significant improvement. Her overall hospital course was complicated by thrombocytopenia (s/p several platelet transfusions), leukocytosis (infectious workup entirely negative- s/p Vanco and Ceftriaxone), AFIB with RVR (resolved with Metoprolol), dysphagia (requiring NGT), transaminitis (secondary to acute illness and hypotension),  non-occlusive RIJ DVT (started on Lovenox). Patient now admitted for a multidisciplinary rehab program. 10-05-23 @ 13:18    * Sustained functional improvement including progress with Wt bearing LE and improvement with ability at transfers, concentrate on prox LE muscle strengthening  * Peer to Peer interview with insurance today   * Medication timing consolidation per patient request   * Peer to Peer completed, insurance approval retrospectively given to cover from 11/14 to 11/20, Insurance co awaits updated notes to determine further approval of coverage     #Interstitial Lung Disease and Mixed connective tissue disease  - Gait Instability, ADL impairments and Functional impairments: start Comprehensive Rehab Program of PT/OT/SLP - 3 hours a day, 5 days a week  - P&O as needed   - Non-specific Interstitial Lung Disease  - Requiring ECMO   - Improvement s/p Plasmapheresis, Rituximab and high- dose steroids   - Albuterol/Ipratropium Nebulizer every 6 hours  - Mepron 1500mg daily  - PO Medrol ( due to liver dysfunction): Plan will be to taper by ~20% every 2 weeks    Medrol   64mg x 2 weeks   56mg x 2 weeks  44mg x 2 weeks   36mg x 2 weeks - Follow up Outpatient   - Plan to wean 02 as tolerated, Continue tapering oxygen,  -  CT Chest noted 11/10---Resp f/u review  11/14, appreciated  They reports sustained improvement, clinical (normal oxy sats on R/A, sustained progress with oxy tapering), and radiological (no acute findings on recent CT Chest, rather residual chronic infiltrative diesease noted, with recommendation to repeat CT after Acute rehab treatment     #Posttnasal drip--ENT review 11/7, declined scope, continue flonase     #pAFIB/Rt Ij thrombus  - Metoprolol 25mg BID and lovenox  --- Eliquis 5mg BID - tolerating well     # Chronic Tinnitus   - ENT review and recs appreciate, Patient already made ENT appointment for f/u    # Lymphedema both legs -chronic and Rt IJ thrombus  - ACE Wrap BL LEs  - BL LE doppler negative for DVT  - BL UE doppler with chronic thrombotic changes in RIJ     #Transaminitis --LFT Down trending   - Secondary to acute illness and hypotension at [facility]  - Outpatient follow up     #Leucocytosis--steroid related, continue monitoring     #Neuropathy  - Gabapentin 300 mg BID, will consider increasing dose, increase to TID 11/10  --Work on motor strengthening, priority for UE as preferred by patient   - Vit b12 >2,000 in 9/2023  --Will consider Rhuematology f/u if needed    #Sleep/Mood  - Melatonin 6mg at HS  - Psych rec Xanax 0.25mg PRN    #Skin  - Skin: Left lower abdomen ruptured blister 3.0 x 1.0, stage 2 pressure injury to right buttock 4 x1 and left buttock 3x1, stage 2 pressure injury ti right inner thigh 0.2 x 0.2, right groin wound 10.5 x 2.0,   Wound care review, Left groin wound 11/15--- 0.6x1.8.0.1cm, no slough, healthy base  -- MAD to skin folds- Nystatin to submammary and abdominal pannus BID  -- MAD to L groin- cleanse with NS, Triad cream daily  -- Mad to R groin- Nystatin powder daily   -- R groin wound-- aquacel packing, Triad to periwound, Allevyn foam- daily and PRN   - Pressure injury/Skin: OOB to Chair, PT/OT   - Offload pressure, low air loss support surfaces, T&P every 2 hours   --Wound care consult-10/9 -Multiple wounds--perineal and buttock/sacral, recs appreciated   --Suggest Continue treatments as below:   Twice daily & PRN Normal Saline Cleanse of abdominal pannus & breast folds, perineal, Left & Right inner buttock erosions.  Pat thoroughly dry.   Apply Desitin generously twice daily (& PRN) to perineal, buttocks, and left groin erosions, leave open to air.    Apply Nystatin powder to abdominal pannus and breast folds.  Suggest use of Interdry cloths in folds to 'wick' moisture.  Suggest daily Normal Saline Cleanse of right groin surgical wound, pat dry.  Apply Cavillon film barrier to intact periwound skin.  Place Aquacell strip over open area, cover with foam dressing.  - Wound care following     #Pain Mgmt   - Tylenol PRN   - Gabapentin 300mg at HS     #GI/Bowel Mgmt   - Pantoprazole  - Senna  --on hold due to recent Loose BM  - PRN: Maalox   - Stool count  --Lactobacillus BID   -- AB XR mod stool burden on transverse colon 10/23--still has mod stool burden, but moving bowel appropriately  - S/p enema and lactulose    # Alternating bowel function --continue probiotic, lactobacillus   * Leucocytosis probably steroid related and partly due to her Mixed Connective Tissue Disease, will continue monitoring     #/Bladder Mgmt   -Spontaneously voiding   - UA (11/3) negative    #FEN   #Dysphagia  - Diet - Minced & Moist  [CC]    - Dysphagia- SLP evaluation     #Health maintenance  - Folic Acid   - MVI  - Ocean nasal spray    # Difficulty with access to peripheral veins-- Blood draws from Left arm Medline     #Precautions / PROPHYLAXIS:   - Falls  - ortho: Weight bearing status: WBAT   - Lungs: Aspiration, Incentive Spirometer   - DVT PPX: Eliquis 5 mg BID   ----------------------------------------  11/20 --Labs CBC mild anemia, normal renal function, LFT elevated, downtrending   ----------------------------------------  IDT conference on 11/14  Social Work: Await peer to peer with CM. Provided private hire list. Lives with spouse in [place] 6 months of year. DC to apt in [place] with elevator, no steps. Supportive spouse.   Function--Supervision for eating and grooming upper body, mod assistance with lower body dressing, max assist with toileting, but patient making sustained effort engaging  Improving motor strength at ankles and knees.   Transfers and ambulation--marked progress  Able to self propell her manual wheel chair with minimum assistance, increasing distance, Able to transfer from higher to lower level, max assistance with slide board  Ambulating in light gate max assist x 2 up to 40 ft  Barriers--need for max assistance for transfers  DME--Host bed, Damaris lift, WC  Est d 11/27--sustained motor improvement of upper extremity function, recently making progress with LE motor function progressing from distal to proxima  (Overall improvement--clinical (healing of Rt groin wound, no new findings on Chest CT as noted by pulmonary team. Functional--improvement with transfers, ambulation and LE strength)  Medical and pulmonary teams monitoring leucocytosis and oxy tapering  Est dc revised to 11/27    Liaison with other providers/agencies    PEER TO PEER with Dr Tripp (patient's insurance company)  * Peer to Peer completed, insurance approval retrospectively given to cover from 11/14 to 11/20, Insurance co awaits updated notes to determine further approval of coverage   SW team to send updated clinical and functional notes to medical insurance company  ----------------------------------------  OUTPATIENT/FOLLOW UP:    Trae Whitney  Pulmonary Disease  [address], [address] [place] [address]  Phone: [phone]  Fax: [phone]  Follow Up Time: 2 weeks    Ryanne Allen  Rheumatology  [address] [address]  Phone: [phone]  Fax: [phone]  Follow Up Time: 1 week    Kyle Pierce  Internal Medicine  [address] [place] [address], [address]  [place] [address]  Phone: [phone]  Fax: [phone]  Follow Up Time: 2 weeks    Addy Powers  Pulmonary Disease  [address] [facility], [address]  [place], [place] [address]  Phone: [phone]  Fax: [phone]  Follow Up Time:     Kwame Hawley  Critical Care Medicine  [address] [facility], [address]  [place] [address]  Phone: [phone]  Fax: [phone]  Follow Up Time:     Neena Borrego  Rheumatology  [address] [facility], Floor 3  [place] [address]  Phone: [phone]  Fax: [phone]  Follow Up Time:    [facility]  [address]  Scheduled Appointment: 09/13/2023  2:40 PM     Time spent 63 mins  Patient review, discussion with family, observation in therapy, and care co ordination

## 2023-11-21 NOTE — PROGRESS NOTE ADULT - SUBJECTIVE AND OBJECTIVE BOX
Follow-up Pulmonary Progress Note  Chief Complaint : Interstitial lung disease      patient seen and examined  states has gotten new machine to measure o2 sat at night  states sat as low in 70s overnight when sleeping   no complaints    Allergies :No Known Allergies      PAST MEDICAL & SURGICAL HISTORY:  Afib    Sciatica    Interstitial lung disease    Lymphedema        Medications:  MEDICATIONS  (STANDING):  apixaban 5 milliGRAM(s) Oral every 12 hours  artificial  tears Solution 1 Drop(s) Both EYES every 12 hours  ascorbic acid 500 milliGRAM(s) Oral daily  atovaquone  Suspension 1500 milliGRAM(s) Oral daily  dextrose 5%. 1000 milliLiter(s) (50 mL/Hr) IV Continuous <Continuous>  dextrose 5%. 1000 milliLiter(s) (100 mL/Hr) IV Continuous <Continuous>  dextrose 50% Injectable 25 Gram(s) IV Push once  dextrose 50% Injectable 25 Gram(s) IV Push once  dextrose 50% Injectable 12.5 Gram(s) IV Push once  folic acid 1 milliGRAM(s) Oral daily  gabapentin 300 milliGRAM(s) Oral three times a day  glucagon  Injectable 1 milliGRAM(s) IntraMuscular once  lactobacillus acidophilus 1 Tablet(s) Oral two times a day with meals  melatonin 6 milliGRAM(s) Oral at bedtime  methylPREDNISolone 44 milliGRAM(s) Oral daily  metoprolol tartrate 12.5 milliGRAM(s) Oral two times a day  multivitamin 1 Tablet(s) Oral daily  nystatin Powder 1 Application(s) Topical two times a day  pantoprazole    Tablet 40 milliGRAM(s) Oral before breakfast  sodium chloride 0.9% lock flush 5 milliLiter(s) IV Push two times a day  zinc oxide 40% Paste 1 Application(s) Topical three times a day    MEDICATIONS  (PRN):  albuterol/ipratropium for Nebulization 3 milliLiter(s) Nebulizer every 6 hours PRN Shortness of Breath and/or Wheezing  ALPRAZolam 0.25 milliGRAM(s) Oral every 6 hours PRN Anxiety  aluminum hydroxide/magnesium hydroxide/simethicone Suspension 30 milliLiter(s) Oral every 4 hours PRN Dyspepsia  ammonium lactate 12% Lotion 1 Application(s) Topical every 12 hours PRN BL feet dryness  benzocaine/menthol Lozenge 1 Lozenge Oral two times a day PRN Sore Throat  benzonatate 100 milliGRAM(s) Oral three times a day PRN Cough  bisacodyl 5 milliGRAM(s) Oral <User Schedule> PRN Constipation  dextrose Oral Gel 15 Gram(s) Oral once PRN Blood Glucose LESS THAN 70 milliGRAM(s)/deciliter  hydrocortisone hemorrhoidal Suppository 1 Suppository(s) Rectal two times a day PRN Hemorrhoids  loperamide 2 milliGRAM(s) Oral three times a day PRN Diarrhea  polyethylene glycol 3350 17 Gram(s) Oral daily PRN Constipation  psyllium Powder 1 Packet(s) Oral daily PRN Constipation  SIMETHICONE SOFTGELS 250 milliGRAM(s),SIMETHICONE SOFTGELS 250 milliGRAM(s) 250 milliGRAM(s) Oral three times a day PRN for gas  sodium chloride 0.65% Nasal 1 Spray(s) Both Nostrils every 8 hours PRN Nasal Congestion      Antibiotics History      Heme Medications       GI Medications  bisacodyl 5 milliGRAM(s) Oral <User Schedule>, 11-21-23 @ 09:11,  PRN  psyllium Powder 1 Packet(s) Oral daily, 11-21-23 @ 09:11,  PRN        LABS:                        10.9   11.94 )-----------( 326      ( 20 Nov 2023 08:48 )             35.5     11-20    140  |  102  |  17  ----------------------------<  117<H>  3.9   |  30  |  0.50    Ca    9.2      20 Nov 2023 08:48    TPro  5.5<L>  /  Alb  2.7<L>  /  TBili  0.9  /  DBili  x   /  AST  45<H>  /  ALT  99<H>  /  AlkPhos  225<H>  11-20              Urinalysis Basic - ( 20 Nov 2023 08:48 )    Color: x / Appearance: x / SG: x / pH: x  Gluc: 117 mg/dL / Ketone: x  / Bili: x / Urobili: x   Blood: x / Protein: x / Nitrite: x   Leuk Esterase: x / RBC: x / WBC x   Sq Epi: x / Non Sq Epi: x / Bacteria: x           VITALS:  T(C): 36.7 (11-21-23 @ 08:32), Max: 36.7 (11-20-23 @ 21:01)  T(F): 98 (11-21-23 @ 08:32), Max: 98.1 (11-20-23 @ 21:01)  HR: 74 (11-21-23 @ 08:32) (74 - 83)  BP: 117/73 (11-21-23 @ 08:32) (117/73 - 124/62)  BP(mean): --  ABP: --  ABP(mean): --  RR: 16 (11-21-23 @ 08:32) (16 - 16)  SpO2: 97% (11-21-23 @ 08:32) (94% - 97%)  CVP(mm Hg): --  CVP(cm H2O): --    Ins and Outs

## 2023-11-21 NOTE — PROGRESS NOTE ADULT - NS ATTEND AMEND GEN_ALL_CORE FT
Seen and examined with NP, Note revised    No acute interval med events  Tolerating treatments    Sustained improvement in therapy  Now self propelling her manual WC > 150ft,  Continued improvement of motor strength overall     Less oxy requirement---only QHS    Continue therapy  Peer to Peer today

## 2023-11-21 NOTE — CHART NOTE - NSCHARTNOTEFT_GEN_A_CORE
Met with patient at bedside for brief (10 minute) supportive session. Her spouse, Kiara, is present. Patient indicates she continues to make progress with upper extremity and trunk control. She continues to require use of Damaris lift. They express concern about patient's insurance limitations and await [id number] decision.      Mood is improved and affect is brighter. Patient is observed brushing her hair and using toothbrush. Biopsychosocial approach used to address stressors including need for continued assistance, ongoing concerns with ambulation, and uncertainty regarding next steps in the rehabilitation process. Support and encouragement are provided.     Neuropsychology remains available through discharge.

## 2023-11-22 PROCEDURE — 99232 SBSQ HOSP IP/OBS MODERATE 35: CPT

## 2023-11-22 PROCEDURE — 74018 RADEX ABDOMEN 1 VIEW: CPT | Mod: 26

## 2023-11-22 RX ADMIN — SODIUM CHLORIDE 5 MILLILITER(S): 9 INJECTION INTRAMUSCULAR; INTRAVENOUS; SUBCUTANEOUS at 08:53

## 2023-11-22 RX ADMIN — Medication 1 TABLET(S): at 21:09

## 2023-11-22 RX ADMIN — SODIUM CHLORIDE 5 MILLILITER(S): 9 INJECTION INTRAMUSCULAR; INTRAVENOUS; SUBCUTANEOUS at 21:19

## 2023-11-22 RX ADMIN — NYSTATIN CREAM 1 APPLICATION(S): 100000 CREAM TOPICAL at 21:06

## 2023-11-22 RX ADMIN — Medication 44 MILLIGRAM(S): at 08:51

## 2023-11-22 RX ADMIN — Medication 12.5 MILLIGRAM(S): at 08:49

## 2023-11-22 RX ADMIN — PANTOPRAZOLE SODIUM 40 MILLIGRAM(S): 20 TABLET, DELAYED RELEASE ORAL at 08:49

## 2023-11-22 RX ADMIN — Medication 1 DROP(S): at 21:06

## 2023-11-22 RX ADMIN — Medication 1 DROP(S): at 08:50

## 2023-11-22 RX ADMIN — Medication 500 MILLIGRAM(S): at 21:07

## 2023-11-22 RX ADMIN — APIXABAN 5 MILLIGRAM(S): 2.5 TABLET, FILM COATED ORAL at 21:09

## 2023-11-22 RX ADMIN — NYSTATIN CREAM 1 APPLICATION(S): 100000 CREAM TOPICAL at 08:50

## 2023-11-22 RX ADMIN — Medication 1 MILLIGRAM(S): at 21:07

## 2023-11-22 RX ADMIN — ZINC OXIDE 1 APPLICATION(S): 200 OINTMENT TOPICAL at 13:11

## 2023-11-22 RX ADMIN — Medication 6 MILLIGRAM(S): at 21:08

## 2023-11-22 RX ADMIN — Medication 1 TABLET(S): at 08:49

## 2023-11-22 RX ADMIN — GABAPENTIN 300 MILLIGRAM(S): 400 CAPSULE ORAL at 14:56

## 2023-11-22 RX ADMIN — ATOVAQUONE 1500 MILLIGRAM(S): 750 SUSPENSION ORAL at 21:05

## 2023-11-22 RX ADMIN — APIXABAN 5 MILLIGRAM(S): 2.5 TABLET, FILM COATED ORAL at 08:49

## 2023-11-22 RX ADMIN — ZINC OXIDE 1 APPLICATION(S): 200 OINTMENT TOPICAL at 21:06

## 2023-11-22 RX ADMIN — GABAPENTIN 300 MILLIGRAM(S): 400 CAPSULE ORAL at 08:49

## 2023-11-22 RX ADMIN — ZINC OXIDE 1 APPLICATION(S): 200 OINTMENT TOPICAL at 08:50

## 2023-11-22 RX ADMIN — GABAPENTIN 300 MILLIGRAM(S): 400 CAPSULE ORAL at 21:08

## 2023-11-22 RX ADMIN — Medication 1 TABLET(S): at 21:07

## 2023-11-22 RX ADMIN — Medication 12.5 MILLIGRAM(S): at 21:07

## 2023-11-22 NOTE — PROGRESS NOTE ADULT - ASSESSMENT
Physical Examination:  GENERAL:                Alert, Oriented, No acute distress.     PULM:                     Bilateral air entry,  poor air entry at bases No Rales, No Rhonchi, No Wheezing  CVS:                         S1, S2,  No Murmur   EXT:                        Trace non pitting edema, nontender, No Cyanosis or Clubbing   NEURO:                  Alert, oriented, interactive, bilateral upper and lower extremity weakness   PSYC:                      Calm, + Insight.      Assessment  62F PMH A-Fib with recently diagnosed MCTD-ILD (+ARIANA 1:1280, RNP>8, Sm>8) who was admitted to [facility] with acute hypoxemic respiratory failure that initially responded to steroids, then with worsening after taper with development of acute hypoxemic and hypercapnic respiratory failure requiring intubation and VV- ECMO on 9/13, then transferred to [facility], concerning for DAH vs ILD flare, decannulated from VV-ECMO 9/21, extubated 9/22. S/p pulse steroids, PLEX (9/15, 9/18, 9/20) and Rituximab (9/16 & 10/3). Course c/b severe leukopenia s/p filgastrim, shock liver with slow to resolved hyperbilirubinemia, RIJ DVT, oropharyngeal dysphagia, and recurrent SVT. Repeat CT chest on 9/30 with markedly improved bilateral groundglass opacities with resolved lung consolidations. Now in acute rehab on 4 lpm NC oxygen and tapering dose of medrol.     Problem List:  1. Interstitial lung disease   2. Mixed connective tissue disease   3. Acute hypoxia  4. RIJ DVT     Plan:  - awaiting overnight pulse oximetry to evaluate accuracy of low o2, suspect will need outpatient home psg to see if has kelsi, continue 1 l qhs for now  - Stable respiratory status,Room air now   - Cont. Methylprednisolone PO, taper as per rheumatology recs. from [facility]   - Continue atovaquone for PCP prophylaxis  - S/p Rituximab 9/16 and 10/3  - S/p PLEX during MICU stay  - Repeat CT scan reviewed, findings do not suggest acute infection but chronic infiltrative disease that is improving, no acute intervention required, will recommend out patient pulmonary follow up with repeat CT after rehab stay.  - No evidence of active infection, monitor off antibiotics, noted increased WBC, unsure if from steroids. monitor closely  - Cont. anticoagulation for DVT associated with ECMO cannula  - Consider incentive spirometry   - Care per primary team  - Discussed with spouse at bedside   - PT OOB as tolerated  - Outpatient pulmonary and rheumatology follow up upon discharge

## 2023-11-22 NOTE — PROGRESS NOTE ADULT - ASSESSMENT
Assessment/Plan:  ALIZA FOLEY is a 62 year old female with PMH of pAFIB and sciatica; who presented to [facility] ED with SOB, and was found to have groundglass opacities and interstitial lung disease. She was treated with empiric Vancomycin and Zosyn. She underwent a bronchoscopy with bronchoalveolar lavage and pulse steroids. She was given IV diuretics for increased pulmonary congestion, but her respiratory status continued to worsen.  On 9/13, she was transferred to [facility] for ECMO requirements. She was further treated with Plasmapheresis, Rituximab and high-dose steroids, with significant improvement. Her overall hospital course was complicated by thrombocytopenia (s/p several platelet transfusions), leukocytosis (infectious workup entirely negative- s/p Vanco and Ceftriaxone), AFIB with RVR (resolved with Metoprolol), dysphagia (requiring NGT), transaminitis (secondary to acute illness and hypotension),  non-occlusive RIJ DVT (started on Lovenox). Patient now admitted for a multidisciplinary rehab program. 10-05-23 @ 13:18    * Sustained functional improvement including progress with Wt bearing LE and improvement with ability at transfers, concentrate on prox LE muscle strengthening  * Peer to Peer completed, insurance approval retrospectively given to cover from 11/14 to 11/20, Insurance co awaits updated notes to determine further approval of coverage   * Abdominal XR to rule out ileus     #Interstitial Lung Disease and Mixed connective tissue disease  - Gait Instability, ADL impairments and Functional impairments: start Comprehensive Rehab Program of PT/OT/SLP - 3 hours a day, 5 days a week  - P&O as needed   - Non-specific Interstitial Lung Disease  - Requiring ECMO   - Improvement s/p Plasmapheresis, Rituximab and high- dose steroids   - Albuterol/Ipratropium Nebulizer every 6 hours  - Mepron 1500mg daily  - PO Medrol ( due to liver dysfunction): Plan will be to taper by ~20% every 2 weeks    Medrol   64mg x 2 weeks   56mg x 2 weeks  44mg x 2 weeks   36mg x 2 weeks - Follow up Outpatient   - Plan to wean 02 as tolerated, Continue tapering oxygen,  -  CT Chest noted 11/10---Resp f/u review  11/14, appreciated  They reports sustained improvement, clinical (normal oxy sats on R/A, sustained progress with oxy tapering), and radiological (no acute findings on recent CT Chest, rather residual chronic infiltrative diesease noted, with recommendation to repeat CT after Acute rehab treatment     #Posttnasal drip--ENT review 11/7, declined scope, continue flonase     #pAFIB/Rt Ij thrombus  - Metoprolol 25mg BID and lovenox  --- Eliquis 5mg BID - tolerating well     # Chronic Tinnitus   - ENT review and recs appreciate, Patient already made ENT appointment for f/u    # Lymphedema both legs -chronic and Rt IJ thrombus  - ACE Wrap BL LEs  - BL LE doppler negative for DVT  - BL UE doppler with chronic thrombotic changes in RIJ     #Transaminitis --LFT Down trending   - Secondary to acute illness and hypotension at [facility]  - Outpatient follow up     #Leucocytosis--steroid related, continue monitoring     #Neuropathy  - Gabapentin 300 mg BID, will consider increasing dose, increase to TID 11/10  --Work on motor strengthening, priority for UE as preferred by patient   - Vit b12 >2,000 in 9/2023  --Will consider Rhuematology f/u if needed    #Sleep/Mood  - Melatonin 6mg at HS  - Psych rec Xanax 0.25mg PRN    #Skin  - Skin: Left lower abdomen ruptured blister 3.0 x 1.0, stage 2 pressure injury to right buttock 4 x1 and left buttock 3x1, stage 2 pressure injury ti right inner thigh 0.2 x 0.2, right groin wound 10.5 x 2.0,   Wound care review, Left groin wound 11/15--- 0.6x1.8.0.1cm, no slough, healthy base  -- MAD to skin folds- Nystatin to submammary and abdominal pannus BID  -- MAD to L groin- cleanse with NS, Triad cream daily  -- Mad to R groin- Nystatin powder daily   -- R groin wound-- aquacel packing, Triad to periwound, Allevyn foam- daily and PRN   - Pressure injury/Skin: OOB to Chair, PT/OT   - Offload pressure, low air loss support surfaces, T&P every 2 hours   --Wound care consult-10/9 -Multiple wounds--perineal and buttock/sacral, recs appreciated   --Suggest Continue treatments as below:   Twice daily & PRN Normal Saline Cleanse of abdominal pannus & breast folds, perineal, Left & Right inner buttock erosions.  Pat thoroughly dry.   Apply Desitin generously twice daily (& PRN) to perineal, buttocks, and left groin erosions, leave open to air.    Apply Nystatin powder to abdominal pannus and breast folds.  Suggest use of Interdry cloths in folds to 'wick' moisture.  Suggest daily Normal Saline Cleanse of right groin surgical wound, pat dry.  Apply Cavillon film barrier to intact periwound skin.  Place Aquacell strip over open area, cover with foam dressing.  - Wound care following     #Pain Mgmt   - Tylenol PRN   - Gabapentin 300mg at HS     #GI/Bowel Mgmt   - Pantoprazole  - Senna  --on hold due to recent Loose BM  - PRN: Maalox   - Stool count  --Lactobacillus BID   -- AB XR mod stool burden on transverse colon 10/23--still has mod stool burden, but moving bowel appropriately  - S/p enema and lactulose  - Abdominal XR to rule out ileus     #/Bladder Mgmt   -Spontaneously voiding   - UA (11/3) negative    #FEN   #Dysphagia  - Diet - Minced & Moist  [CC]    - Dysphagia- SLP evaluation     #Health maintenance  - Folic Acid   - MVI  - Ocean nasal spray    # Difficulty with access to peripheral veins-- Blood draws from Left arm Medline     #Precautions / PROPHYLAXIS:   - Falls  - ortho: Weight bearing status: WBAT   - Lungs: Aspiration, Incentive Spirometer   - DVT PPX: Eliquis 5 mg BID   ----------------------------------------  11/20 --Labs CBC mild anemia, normal renal function, LFT elevated, downtrending   ----------------------------------------  IDT conference on 11/14  Social Work: Await peer to peer with CM. Provided private hire list. Lives with spouse in [place] 6 months of year. DC to apt in [place] with elevator, no steps. Supportive spouse.   Function--Supervision for eating and grooming upper body, mod assistance with lower body dressing, max assist with toileting, but patient making sustained effort engaging  Improving motor strength at ankles and knees.   Transfers and ambulation--marked progress  Able to self propell her manual wheel chair with minimum assistance, increasing distance, Able to transfer from higher to lower level, max assistance with slide board  Ambulating in light gate max assist x 2 up to 40 ft  Barriers--need for max assistance for transfers  DME--Host bed, Damaris lift, WC  Est d 11/27--sustained motor improvement of upper extremity function, recently making progress with LE motor function progressing from distal to proxima  (Overall improvement--clinical (healing of Rt groin wound, no new findings on Chest CT as noted by pulmonary team. Functional--improvement with transfers, ambulation and LE strength)  Medical and pulmonary teams monitoring leucocytosis and oxy tapering  Est dc revised to 11/27    Liaison with other providers/agencies    PEER TO PEER with Dr Tripp (patient's insurance company)  * Peer to Peer completed, insurance approval retrospectively given to cover from 11/14 to 11/20, Insurance co awaits updated notes to determine further approval of coverage   SW team to send updated clinical and functional notes to medical insurance company  ----------------------------------------  OUTPATIENT/FOLLOW UP:    Trae Whitney  Pulmonary Disease  [address], [address] [place] [address]  Phone: [phone]  Fax: [phone]  Follow Up Time: 2 weeks    Ryanne Allen  Rheumatology  [address] [address]  Phone: [phone]  Fax: [phone]  Follow Up Time: 1 week    Kyle Pierce  Internal Medicine  [address] [place] [address], [address]  [place] [address]  Phone: [phone]  Fax: [phone]  Follow Up Time: 2 weeks    Addy Powers  Pulmonary Disease  [address] [facility], [address]  [place], [place] [address]  Phone: [phone]  Fax: [phone]  Follow Up Time:     Kwame Hawley  Critical Care Medicine  [address] [facility], [address]  [place] [address]  Phone: [phone]  Fax: [phone]  Follow Up Time:     Neena Borrego  Rheumatology  [address] [facility], [address]  [place] [address]  Phone: [phone]  Fax: [phone]  Follow Up Time:    [facility] [address]  Scheduled Appointment: 09/13/2023  2:40 PM     Time spent 63 mins  Patient review, discussion with family, observation in therapy, and care co ordination  Assessment/Plan:  ALIZA FOLEY is a 62 year old female with PMH of pAFIB and sciatica; who presented to [facility] ED with SOB, and was found to have groundglass opacities and interstitial lung disease. She was treated with empiric Vancomycin and Zosyn. She underwent a bronchoscopy with bronchoalveolar lavage and pulse steroids. She was given IV diuretics for increased pulmonary congestion, but her respiratory status continued to worsen.  On 9/13, she was transferred to [facility] for ECMO requirements. She was further treated with Plasmapheresis, Rituximab and high-dose steroids, with significant improvement. Her overall hospital course was complicated by thrombocytopenia (s/p several platelet transfusions), leukocytosis (infectious workup entirely negative- s/p Vanco and Ceftriaxone), AFIB with RVR (resolved with Metoprolol), dysphagia (requiring NGT), transaminitis (secondary to acute illness and hypotension),  non-occlusive RIJ DVT (started on Lovenox). Patient now admitted for a multidisciplinary rehab program. 10-05-23 @ 13:18    * Sustained functional improvement including progress with Wt bearing LE and improvement with ability at transfers, concentrate on prox LE muscle strengthening  * Peer to Peer completed, insurance approval retrospectively given to cover from 11/14 to 11/20, Insurance co awaits updated notes to determine further approval of coverage   * Abdominal XR to rule out ileus     #Interstitial Lung Disease and Mixed connective tissue disease  - Gait Instability, ADL impairments and Functional impairments: start Comprehensive Rehab Program of PT/OT/SLP - 3 hours a day, 5 days a week  - P&O as needed   - Non-specific Interstitial Lung Disease  - Requiring ECMO   - Improvement s/p Plasmapheresis, Rituximab and high- dose steroids   - Albuterol/Ipratropium Nebulizer every 6 hours  - Mepron 1500mg daily  - PO Medrol ( due to liver dysfunction): Plan will be to taper by ~20% every 2 weeks    Medrol   64mg x 2 weeks   56mg x 2 weeks  44mg x 2 weeks   36mg x 2 weeks - Follow up Outpatient   - Plan to wean 02 as tolerated, Continue tapering oxygen,  -  CT Chest noted 11/10---Resp f/u review  11/14, appreciated  They reports sustained improvement, clinical (normal oxy sats on R/A, sustained progress with oxy tapering), and radiological (no acute findings on recent CT Chest, rather residual chronic infiltrative diesease noted, with recommendation to repeat CT after Acute rehab treatment     #Posttnasal drip--ENT review 11/7, declined scope, continue flonase     #pAFIB/Rt Ij thrombus  - Metoprolol 25mg BID and lovenox  --- Eliquis 5mg BID - tolerating well     # Chronic Tinnitus   - ENT review and recs appreciate, Patient already made ENT appointment for f/u    # Lymphedema both legs -chronic and Rt IJ thrombus  - ACE Wrap BL LEs  - BL LE doppler negative for DVT  - BL UE doppler with chronic thrombotic changes in RIJ     #Transaminitis --LFT Down trending   - Secondary to acute illness and hypotension at [facility]  - Outpatient follow up     #Leucocytosis--steroid related, continue monitoring     #Neuropathy  - Gabapentin 300 mg BID, will consider increasing dose, increase to TID 11/10  --Work on motor strengthening, priority for UE as preferred by patient   - Vit b12 >2,000 in 9/2023  --Will consider Rhuematology f/u if needed    #Sleep/Mood  - Melatonin 6mg at HS  - Psych rec Xanax 0.25mg PRN    #Skin  - Skin: Left lower abdomen ruptured blister 3.0 x 1.0, stage 2 pressure injury to right buttock 4 x1 and left buttock 3x1, stage 2 pressure injury ti right inner thigh 0.2 x 0.2, right groin wound 10.5 x 2.0,   Wound care review, Left groin wound 11/15--- 0.6x1.8.0.1cm, no slough, healthy base  -- MAD to skin folds- Nystatin to submammary and abdominal pannus BID  -- MAD to L groin- cleanse with NS, Triad cream daily  -- Mad to R groin- Nystatin powder daily   -- R groin wound-- aquacel packing, Triad to periwound, Allevyn foam- daily and PRN   - Pressure injury/Skin: OOB to Chair, PT/OT   - Offload pressure, low air loss support surfaces, T&P every 2 hours   --Wound care consult-10/9 -Multiple wounds--perineal and buttock/sacral, recs appreciated   --Suggest Continue treatments as below:   Twice daily & PRN Normal Saline Cleanse of abdominal pannus & breast folds, perineal, Left & Right inner buttock erosions.  Pat thoroughly dry.   Apply Desitin generously twice daily (& PRN) to perineal, buttocks, and left groin erosions, leave open to air.    Apply Nystatin powder to abdominal pannus and breast folds.  Suggest use of Interdry cloths in folds to 'wick' moisture.  Suggest daily Normal Saline Cleanse of right groin surgical wound, pat dry.  Apply Cavillon film barrier to intact periwound skin.  Place Aquacell strip over open area, cover with foam dressing.  - Wound care following     #Pain Mgmt   - Tylenol PRN   - Gabapentin 300mg at HS     #GI/Bowel Mgmt   - Pantoprazole  - Senna  --on hold due to recent Loose BM  - PRN: Maalox   - Stool count  --Lactobacillus BID   -- AB XR mod stool burden on transverse colon 10/23--still has mod stool burden, but moving bowel appropriately  - S/p enema and lactulose  - Abdominal XR to rule out ileus     #/Bladder Mgmt   -Spontaneously voiding   - UA (11/3) negative    #FEN   #Dysphagia  - Diet - Minced & Moist  [CC]    - Dysphagia- SLP evaluation     #Health maintenance  - Folic Acid   - MVI  - Ocean nasal spray    # Difficulty with access to peripheral veins-- Blood draws from Left arm Medline     #Precautions / PROPHYLAXIS:   - Falls  - ortho: Weight bearing status: WBAT   - Lungs: Aspiration, Incentive Spirometer   - DVT PPX: Eliquis 5 mg BID   ----------------------------------------  11/20 --Labs CBC mild anemia, normal renal function, LFT elevated, downtrending   ----------------------------------------  Liaison with other providers/agencies    PEER TO PEER with Dr Tripp (patient's insurance company)  * Peer to Peer completed, insurance approval retrospectively given to cover from 11/14 to 11/20, Insurance co awaits updated notes to determine further approval of coverage   SW team to send updated clinical and functional notes to medical insurance company  ----------------------------------------  IDT conference on 11/22  Social Work: Approved with insurance til 11/26  SLP: --  OT: Sup for UBD/CGA. Min A for LBD. Total A for all other ADLs/transfers.  PT: Mod A for transfers with SB. 5 steps in parallel bars with Max A of 3. 150ft WC mobility with CGA.   Amb 50 ft with hoyerlift and cardiac walker.   RT: --  DME: Damaris, SB, hip kit, hospital bed, WC.   Barriers: Weakness.   Functional level on DC: Min A with SB transfers.   TDD: 12/6 to home.  ----------------------------------------  OUTPATIENT/FOLLOW UP:    Trae Whitney  Pulmonary Disease  [address], [address] [place] [address]  Phone: [phone]  Fax: [phone]  Follow Up Time: 2 weeks    Ryanne Allen  Rheumatology  [address] [address]  Phone: [phone]  Fax: [phone]  Follow Up Time: 1 week    Kyle Pierce  Internal Medicine  [address] [place] [address], [address]  [place] [address]  Phone: [phone]  Fax: [phone]  Follow Up Time: 2 weeks    Addy Powers  Pulmonary Disease  [address] [facility], [address]  [place], [place] [id number]  Phone: [phone]  Fax: [phone]  Follow Up Time:     Kwame Hawley  Critical Care Medicine  [address] [facility], [address]  [place] [address]  Phone: [phone]  Fax: [phone]  Follow Up Time:     Neena Borrego  Rheumatology  [address] [facility], [address]  [place] [address]  Phone: [phone]  Fax: [phone]  Follow Up Time:    [facility]  [facility] [address]  Scheduled Appointment: 09/13/2023  2:40 PM     Time spent 63 mins  Patient review, discussion with family, observation in therapy, and care co ordination

## 2023-11-22 NOTE — PROGRESS NOTE ADULT - NS ATTEND AMEND GEN_ALL_CORE FT
Seen and examined, note revised    Reports symptoms reminiscent of her dyspepsia,     Slept well, tolerating oral diet  Labs unremarkable    Observed in therapy--details as in IDT today    Continue therapy  Xray abd f/u   Continue gas x , add maalox

## 2023-11-22 NOTE — PROGRESS NOTE ADULT - SUBJECTIVE AND OBJECTIVE BOX
Follow-up Pulmonary Progress Note  Chief Complaint : Interstitial lung disease      pt's overnight monitor has sats in mid 80s overnight  has no sob, cp, palp  room air rest sat 89% when seen  no cp, sob, palp,   overnight pulse ox not done, d/w respiratory to do one tonight.       Allergies :No Known Allergies      PAST MEDICAL & SURGICAL HISTORY:  Afib    Sciatica    Interstitial lung disease    Lymphedema        Medications:  MEDICATIONS  (STANDING):  apixaban 5 milliGRAM(s) Oral every 12 hours  artificial  tears Solution 1 Drop(s) Both EYES every 12 hours  ascorbic acid 500 milliGRAM(s) Oral daily  atovaquone  Suspension 1500 milliGRAM(s) Oral daily  dextrose 5%. 1000 milliLiter(s) (50 mL/Hr) IV Continuous <Continuous>  dextrose 5%. 1000 milliLiter(s) (100 mL/Hr) IV Continuous <Continuous>  dextrose 50% Injectable 12.5 Gram(s) IV Push once  dextrose 50% Injectable 25 Gram(s) IV Push once  dextrose 50% Injectable 25 Gram(s) IV Push once  folic acid 1 milliGRAM(s) Oral daily  gabapentin 300 milliGRAM(s) Oral three times a day  glucagon  Injectable 1 milliGRAM(s) IntraMuscular once  lactobacillus acidophilus 1 Tablet(s) Oral two times a day with meals  melatonin 6 milliGRAM(s) Oral at bedtime  methylPREDNISolone 44 milliGRAM(s) Oral daily  metoprolol tartrate 12.5 milliGRAM(s) Oral two times a day  multivitamin 1 Tablet(s) Oral daily  nystatin Powder 1 Application(s) Topical two times a day  pantoprazole    Tablet 40 milliGRAM(s) Oral before breakfast  sodium chloride 0.9% lock flush 5 milliLiter(s) IV Push two times a day  zinc oxide 40% Paste 1 Application(s) Topical three times a day    MEDICATIONS  (PRN):  albuterol/ipratropium for Nebulization 3 milliLiter(s) Nebulizer every 6 hours PRN Shortness of Breath and/or Wheezing  ALPRAZolam 0.25 milliGRAM(s) Oral every 6 hours PRN Anxiety  aluminum hydroxide/magnesium hydroxide/simethicone Suspension 30 milliLiter(s) Oral every 4 hours PRN Dyspepsia  ammonium lactate 12% Lotion 1 Application(s) Topical every 12 hours PRN BL feet dryness  benzocaine/menthol Lozenge 1 Lozenge Oral two times a day PRN Sore Throat  benzonatate 100 milliGRAM(s) Oral three times a day PRN Cough  bisacodyl 5 milliGRAM(s) Oral <User Schedule> PRN Constipation  dextrose Oral Gel 15 Gram(s) Oral once PRN Blood Glucose LESS THAN 70 milliGRAM(s)/deciliter  hydrocortisone hemorrhoidal Suppository 1 Suppository(s) Rectal two times a day PRN Hemorrhoids  loperamide 2 milliGRAM(s) Oral three times a day PRN Diarrhea  polyethylene glycol 3350 17 Gram(s) Oral daily PRN Constipation  psyllium Powder 1 Packet(s) Oral daily PRN Constipation  SIMETHICONE SOFTGELS 250 milliGRAM(s),SIMETHICONE SOFTGELS 250 milliGRAM(s) 250 milliGRAM(s) Oral three times a day PRN for gas  sodium chloride 0.65% Nasal 1 Spray(s) Both Nostrils every 8 hours PRN Nasal Congestion       GI Medications  bisacodyl 5 milliGRAM(s) Oral <User Schedule>, 11-21-23 @ 09:11,  PRN  psyllium Powder 1 Packet(s) Oral daily, 11-21-23 @ 09:11,  PRN           VITALS:  T(C): 36.9 (11-22-23 @ 08:46), Max: 36.9 (11-22-23 @ 08:46)  T(F): 98.5 (11-22-23 @ 08:46), Max: 98.5 (11-22-23 @ 08:46)  HR: 82 (11-22-23 @ 08:46) (82 - 96)  BP: 115/71 (11-22-23 @ 08:46) (109/69 - 115/71)  BP(mean): --  ABP: --  ABP(mean): --  RR: 16 (11-22-23 @ 08:46) (16 - 16)  SpO2: 93% (11-22-23 @ 08:46) (93% - 95%)  CVP(mm Hg): --  CVP(cm H2O): --    Ins and Outs

## 2023-11-22 NOTE — PROGRESS NOTE ADULT - ASSESSMENT
62F with AF, hx sciatica, newly diagnosed ILD (likely associated with MCTD +ARIANA, +RNP, +Sm) with exacerbation requiring ECMO / plasmapharesis / Rituximab / steriods, now at acute rehab      #abdominal discomfort  - agree with primary team, check KUB  - supportive care     #Transaminitis  - LFT trending down.  - limit Tylenol use, avoid statin and other hepatoxic meds  - monitor    #chronic macrocytic anemia  - H/H remains stable  - on MVI, folate and thiamine    #hyponatremia,   -resolved     #leukocytosis due to steroid use,   - improving  - non-focal    #Interstitial Lung Disease  #Mixed connective tissue disease  #Suspect critical illness myopathy from prolonged ICU stay, resolved  - CT 11/10 new predominant peripheral reticular opacities, within lung paranchyma; no significant bronchiectasis or honeycombing.   -c/w steroids - taper as scheduled (2 week intervals)   -c/w nebs  -c/w PT/OT  -Mepron for PCP ppx while on steroids   - check ambulatory O2 sat periodically    #PAF  #Non-occlusive right IJ thrombus   -c/w Eliquis  -c/w lopressor 12.5mg po BID  - goal HR for pAfib is <110    #Anxiety  -appreciate psych follow-up  -c/w Xanax PRN    # ?JACEY  # nocturnal desat  - discussed about CPAP use. oxygen use during sleep and PRN. please check nocturnal O2 sat during sleep and document. patient may need OP sleep study. patient is aware. she will speak to her pulm    #  Severe protein-calorie malnutrition.  - nutrition on board    #DVT ppx: Eliquis    d/w rehab team

## 2023-11-22 NOTE — PROGRESS NOTE ADULT - SUBJECTIVE AND OBJECTIVE BOX
Patient is a 62y old  Female who presents with a chief complaint of Interstitial Lung Disease (22 Nov 2023 12:54)      Subjective and overnight events:  Patient seen and examined at bedside. no complaints. no fever, chills, sob, cp. + abdominal discomfort + BM    ALLERGIES:  No Known Allergies    MEDICATIONS  (STANDING):  apixaban 5 milliGRAM(s) Oral every 12 hours  artificial  tears Solution 1 Drop(s) Both EYES every 12 hours  ascorbic acid 500 milliGRAM(s) Oral daily  atovaquone  Suspension 1500 milliGRAM(s) Oral daily  dextrose 5%. 1000 milliLiter(s) (50 mL/Hr) IV Continuous <Continuous>  dextrose 5%. 1000 milliLiter(s) (100 mL/Hr) IV Continuous <Continuous>  dextrose 50% Injectable 12.5 Gram(s) IV Push once  dextrose 50% Injectable 25 Gram(s) IV Push once  dextrose 50% Injectable 25 Gram(s) IV Push once  folic acid 1 milliGRAM(s) Oral daily  gabapentin 300 milliGRAM(s) Oral three times a day  glucagon  Injectable 1 milliGRAM(s) IntraMuscular once  lactobacillus acidophilus 1 Tablet(s) Oral two times a day with meals  melatonin 6 milliGRAM(s) Oral at bedtime  methylPREDNISolone 44 milliGRAM(s) Oral daily  metoprolol tartrate 12.5 milliGRAM(s) Oral two times a day  multivitamin 1 Tablet(s) Oral daily  nystatin Powder 1 Application(s) Topical two times a day  pantoprazole    Tablet 40 milliGRAM(s) Oral before breakfast  sodium chloride 0.9% lock flush 5 milliLiter(s) IV Push two times a day  zinc oxide 40% Paste 1 Application(s) Topical three times a day    MEDICATIONS  (PRN):  albuterol/ipratropium for Nebulization 3 milliLiter(s) Nebulizer every 6 hours PRN Shortness of Breath and/or Wheezing  ALPRAZolam 0.25 milliGRAM(s) Oral every 6 hours PRN Anxiety  aluminum hydroxide/magnesium hydroxide/simethicone Suspension 30 milliLiter(s) Oral every 4 hours PRN Dyspepsia  ammonium lactate 12% Lotion 1 Application(s) Topical every 12 hours PRN BL feet dryness  benzocaine/menthol Lozenge 1 Lozenge Oral two times a day PRN Sore Throat  benzonatate 100 milliGRAM(s) Oral three times a day PRN Cough  bisacodyl 5 milliGRAM(s) Oral <User Schedule> PRN Constipation  dextrose Oral Gel 15 Gram(s) Oral once PRN Blood Glucose LESS THAN 70 milliGRAM(s)/deciliter  hydrocortisone hemorrhoidal Suppository 1 Suppository(s) Rectal two times a day PRN Hemorrhoids  loperamide 2 milliGRAM(s) Oral three times a day PRN Diarrhea  polyethylene glycol 3350 17 Gram(s) Oral daily PRN Constipation  psyllium Powder 1 Packet(s) Oral daily PRN Constipation  SIMETHICONE SOFTGELS 250 milliGRAM(s),SIMETHICONE SOFTGELS 250 milliGRAM(s) 250 milliGRAM(s) Oral three times a day PRN for gas  sodium chloride 0.65% Nasal 1 Spray(s) Both Nostrils every 8 hours PRN Nasal Congestion    Vital Signs Last 24 Hrs  T(F): 98.5 (22 Nov 2023 08:46), Max: 98.5 (22 Nov 2023 08:46)  HR: 82 (22 Nov 2023 08:46) (82 - 96)  BP: 115/71 (22 Nov 2023 08:46) (109/69 - 115/71)  RR: 16 (22 Nov 2023 08:46) (16 - 16)  SpO2: 93% (22 Nov 2023 08:46) (93% - 95%)  I&O's Summary    PHYSICAL EXAM:  General: NAD, A/O x 3  ENT: MMM  Neck: Supple, No JVD  Lungs: Clear to auscultation bilaterally  Cardio: RRR, S1/S2, No murmurs  Abdomen: Soft, Nontender, Nondistended; Bowel sounds present  Extremities: No calf tenderness, No pitting edema    LABS:                        10.9   11.94 )-----------( 326      ( 20 Nov 2023 08:48 )             35.5     11-20    140  |  102  |  17  ----------------------------<  117  3.9   |  30  |  0.50    Ca    9.2      20 Nov 2023 08:48    TPro  5.5  /  Alb  2.7  /  TBili  0.9  /  DBili  x   /  AST  45  /  ALT  99  /  AlkPhos  225  11-20      09-25 Chol -- LDL -- HDL -- Trig 199 mg/dL        Urinalysis Basic - ( 20 Nov 2023 08:48 )    Color: x / Appearance: x / SG: x / pH: x  Gluc: 117 mg/dL / Ketone: x  / Bili: x / Urobili: x   Blood: x / Protein: x / Nitrite: x   Leuk Esterase: x / RBC: x / WBC x   Sq Epi: x / Non Sq Epi: x / Bacteria: x            RADIOLOGY & ADDITIONAL TESTS:    Care Discussed with Consultants/Other Providers:

## 2023-11-22 NOTE — PROGRESS NOTE ADULT - SUBJECTIVE AND OBJECTIVE BOX
SUBJECTIVE/ROS:    Patient seen and examined at bedside this AM with Dr. López, partner present  Admits to sleeping well.  Experiencing frequent BMs and gas.  Discussed need for Ab XR to rule out ileus.   Discussed results of Peer to Peer interview with insurance yesterday.    ROS--No chest or abd pain, no palpitations nausea or vomiting  Tingling/numbness of fingers, non progressive  LBM 11/22    Therapy.--  Improvement with strength at knee--now antigravity  Observed ambulating with manual WC, self propelling it > 100ft  Sustained improvement of upper extremity function and strength    Vital Signs Last 24 Hrs  T(C): 36.9 (22 Nov 2023 08:46), Max: 36.9 (22 Nov 2023 08:46)  T(F): 98.5 (22 Nov 2023 08:46), Max: 98.5 (22 Nov 2023 08:46)  HR: 82 (22 Nov 2023 08:46) (82 - 96)  BP: 115/71 (22 Nov 2023 08:46) (109/69 - 115/71)  BP(mean): --  RR: 16 (22 Nov 2023 08:46) (16 - 16)  SpO2: 93% (22 Nov 2023 08:46) (93% - 95%)      PHYSICAL EXAM:  Gen -  Comfortable,  at bedside -normal oxy sat and subsequently self ambulating WC  Pulm - clear  Cardiovascular - HS i and II intermittently irregular  Abdomen - Soft, non tender, hypoactive bowel sounds  Extremities - edema to b/l LE, no calf tenderness, LUE Med line    Neuro-  Cognitive - awake, alert, fully oriented, engaging, appropriate,   Light tough sensation diffusely reduced on fingers and dorsal foot, and over right lower jaw, localized area approx 2 cm, no skin changes noted, non progressive   Motor -                    LEFT    UE - 4/5                    RIGHT UE - 4/5                     LEFT    LE - 3+/5, distally and 3/5 proximal                    RIGHT LE - Ankles PF 3/5 ,DF 2/5, Knee ext 3/5 Hips limited by pain Rt hip due to ulcer at ECHO access site   Sensory - decreased light tough sensation fingers and sole of foot, difusely  MSK: improvement with motor strength  Psychiatric - Mood stable, Affect WNL  Skin -- , stage 2 pressure injury to right buttock 4 x1 and left buttock 3x1, stage 2 pressure injury ti right inner thigh 0.2 x 0.2, right groin wound 10.5 x 2.0, Left groin wound 0.6x1.8.0.1cm    MMT--Able to contract B/L upper back muscles, EF/EE 4/5 distally, knee Est 3/5        LABS:                        10.9   11.94 )-----------( 326      ( 20 Nov 2023 08:48 )             35.5     11-20    140  |  102  |  17  ----------------------------<  117<H>  3.9   |  30  |  0.50    Ca    9.2      20 Nov 2023 08:48    TPro  5.5<L>  /  Alb  2.7<L>  /  TBili  0.9  /  DBili  x   /  AST  45<H>  /  ALT  99<H>  /  AlkPhos  225<H>  11-20      Urinalysis Basic - ( 20 Nov 2023 08:48 )    Color: x / Appearance: x / SG: x / pH: x  Gluc: 117 mg/dL / Ketone: x  / Bili: x / Urobili: x   Blood: x / Protein: x / Nitrite: x   Leuk Esterase: x / RBC: x / WBC x   Sq Epi: x / Non Sq Epi: x / Bacteria: x        MEDICATIONS  (STANDING):  apixaban 5 milliGRAM(s) Oral every 12 hours  artificial  tears Solution 1 Drop(s) Both EYES every 12 hours  ascorbic acid 500 milliGRAM(s) Oral daily  atovaquone  Suspension 1500 milliGRAM(s) Oral daily  dextrose 5%. 1000 milliLiter(s) (100 mL/Hr) IV Continuous <Continuous>  dextrose 5%. 1000 milliLiter(s) (50 mL/Hr) IV Continuous <Continuous>  dextrose 50% Injectable 12.5 Gram(s) IV Push once  dextrose 50% Injectable 25 Gram(s) IV Push once  dextrose 50% Injectable 25 Gram(s) IV Push once  folic acid 1 milliGRAM(s) Oral daily  gabapentin 300 milliGRAM(s) Oral three times a day  glucagon  Injectable 1 milliGRAM(s) IntraMuscular once  lactobacillus acidophilus 1 Tablet(s) Oral two times a day with meals  melatonin 6 milliGRAM(s) Oral at bedtime  methylPREDNISolone 44 milliGRAM(s) Oral daily  metoprolol tartrate 12.5 milliGRAM(s) Oral two times a day  multivitamin 1 Tablet(s) Oral daily  nystatin Powder 1 Application(s) Topical two times a day  pantoprazole    Tablet 40 milliGRAM(s) Oral before breakfast  sodium chloride 0.9% lock flush 5 milliLiter(s) IV Push two times a day  zinc oxide 40% Paste 1 Application(s) Topical three times a day    MEDICATIONS  (PRN):  albuterol/ipratropium for Nebulization 3 milliLiter(s) Nebulizer every 6 hours PRN Shortness of Breath and/or Wheezing  ALPRAZolam 0.25 milliGRAM(s) Oral every 6 hours PRN Anxiety  aluminum hydroxide/magnesium hydroxide/simethicone Suspension 30 milliLiter(s) Oral every 4 hours PRN Dyspepsia  ammonium lactate 12% Lotion 1 Application(s) Topical every 12 hours PRN BL feet dryness  benzocaine/menthol Lozenge 1 Lozenge Oral two times a day PRN Sore Throat  benzonatate 100 milliGRAM(s) Oral three times a day PRN Cough  bisacodyl 5 milliGRAM(s) Oral <User Schedule> PRN Constipation  dextrose Oral Gel 15 Gram(s) Oral once PRN Blood Glucose LESS THAN 70 milliGRAM(s)/deciliter  hydrocortisone hemorrhoidal Suppository 1 Suppository(s) Rectal two times a day PRN Hemorrhoids  loperamide 2 milliGRAM(s) Oral three times a day PRN Diarrhea  polyethylene glycol 3350 17 Gram(s) Oral daily PRN Constipation  psyllium Powder 1 Packet(s) Oral daily PRN Constipation  SIMETHICONE SOFTGELS 250 milliGRAM(s),SIMETHICONE SOFTGELS 250 milliGRAM(s) 250 milliGRAM(s) Oral three times a day PRN for gas  sodium chloride 0.65% Nasal 1 Spray(s) Both Nostrils every 8 hours PRN Nasal Congestion      SUBJECTIVE/ROS:    Patient seen and examined at bedside this AM with Dr. López, partner present  Admits to sleeping well.  Experiencing frequent BMs and gas.  Discussed need for Ab XR to rule out ileus.   Discussed results of Peer to Peer interview with insurance yesterday. approved till 11/26    ROS--No chest or abd pain, no palpitations nausea or vomiting  Tingling/numbness of fingers, non progressive  LBM 11/22    Therapy.  Improvement with strength at knee--now antigravity  Observed ambulating with manual WC, self propelling it > 100ft  Sustained improvement of upper extremity function and strength    Vital Signs Last 24 Hrs  T(C): 36.9 (22 Nov 2023 08:46), Max: 36.9 (22 Nov 2023 08:46)  T(F): 98.5 (22 Nov 2023 08:46), Max: 98.5 (22 Nov 2023 08:46)  HR: 82 (22 Nov 2023 08:46) (82 - 96)  BP: 115/71 (22 Nov 2023 08:46) (109/69 - 115/71)  RR: 16 (22 Nov 2023 08:46) (16 - 16)  SpO2: 93% (22 Nov 2023 08:46) (93% - 95%)      PHYSICAL EXAM:  Gen -  Comfortable,  at bedside -normal oxy sat and subsequently self ambulating WC  Pulm - clear  Cardiovascular - HS i and II intermittently irregular  Abdomen - Soft, non tender, hypoactive bowel sounds  Extremities - edema to b/l LE, no calf tenderness, LUE Med line    Neuro-  Cognitive - awake, alert, fully oriented, engaging, appropriate,   Light tough sensation diffusely reduced on fingers and dorsal foot, and over right lower jaw, localized area approx 2 cm, no skin changes noted, non progressive   Motor -                    LEFT    UE - 4/5                    RIGHT UE - 4/5                     LEFT    LE - 3+/5, distally and 3/5 proximal                    RIGHT LE - Ankles PF 3/5 ,DF 2/5, Knee ext 3/5 Hips limited by pain Rt hip due to ulcer at ECHO access site   Sensory - decreased light tough sensation fingers and sole of foot, difusely  MSK: improvement with motor strength  Psychiatric - Mood stable, Affect WNL  Skin -- , stage 2 pressure injury to right buttock 4 x1 and left buttock 3x1, stage 2 pressure injury ti right inner thigh 0.2 x 0.2, right groin wound 10.5 x 2.0, Left groin wound 0.6x1.8.0.1cm    MMT--Able to contract B/L upper back muscles, EF/EE 4/5 distally, knee Est 3/5        LABS:                        10.9   11.94 )-----------( 326      ( 20 Nov 2023 08:48 )             35.5     11-20    140  |  102  |  17  ----------------------------<  117<H>  3.9   |  30  |  0.50    Ca    9.2      20 Nov 2023 08:48    TPro  5.5<L>  /  Alb  2.7<L>  /  TBili  0.9  /  DBili  x   /  AST  45<H>  /  ALT  99<H>  /  AlkPhos  225<H>  11-20      Urinalysis Basic - ( 20 Nov 2023 08:48 )    Color: x / Appearance: x / SG: x / pH: x  Gluc: 117 mg/dL / Ketone: x  / Bili: x / Urobili: x   Blood: x / Protein: x / Nitrite: x   Leuk Esterase: x / RBC: x / WBC x   Sq Epi: x / Non Sq Epi: x / Bacteria: x        MEDICATIONS  (STANDING):  apixaban 5 milliGRAM(s) Oral every 12 hours  artificial  tears Solution 1 Drop(s) Both EYES every 12 hours  ascorbic acid 500 milliGRAM(s) Oral daily  atovaquone  Suspension 1500 milliGRAM(s) Oral daily  dextrose 5%. 1000 milliLiter(s) (100 mL/Hr) IV Continuous <Continuous>  dextrose 5%. 1000 milliLiter(s) (50 mL/Hr) IV Continuous <Continuous>  dextrose 50% Injectable 12.5 Gram(s) IV Push once  dextrose 50% Injectable 25 Gram(s) IV Push once  dextrose 50% Injectable 25 Gram(s) IV Push once  folic acid 1 milliGRAM(s) Oral daily  gabapentin 300 milliGRAM(s) Oral three times a day  glucagon  Injectable 1 milliGRAM(s) IntraMuscular once  lactobacillus acidophilus 1 Tablet(s) Oral two times a day with meals  melatonin 6 milliGRAM(s) Oral at bedtime  methylPREDNISolone 44 milliGRAM(s) Oral daily  metoprolol tartrate 12.5 milliGRAM(s) Oral two times a day  multivitamin 1 Tablet(s) Oral daily  nystatin Powder 1 Application(s) Topical two times a day  pantoprazole    Tablet 40 milliGRAM(s) Oral before breakfast  sodium chloride 0.9% lock flush 5 milliLiter(s) IV Push two times a day  zinc oxide 40% Paste 1 Application(s) Topical three times a day    MEDICATIONS  (PRN):  albuterol/ipratropium for Nebulization 3 milliLiter(s) Nebulizer every 6 hours PRN Shortness of Breath and/or Wheezing  ALPRAZolam 0.25 milliGRAM(s) Oral every 6 hours PRN Anxiety  aluminum hydroxide/magnesium hydroxide/simethicone Suspension 30 milliLiter(s) Oral every 4 hours PRN Dyspepsia  ammonium lactate 12% Lotion 1 Application(s) Topical every 12 hours PRN BL feet dryness  benzocaine/menthol Lozenge 1 Lozenge Oral two times a day PRN Sore Throat  benzonatate 100 milliGRAM(s) Oral three times a day PRN Cough  bisacodyl 5 milliGRAM(s) Oral <User Schedule> PRN Constipation  dextrose Oral Gel 15 Gram(s) Oral once PRN Blood Glucose LESS THAN 70 milliGRAM(s)/deciliter  hydrocortisone hemorrhoidal Suppository 1 Suppository(s) Rectal two times a day PRN Hemorrhoids  loperamide 2 milliGRAM(s) Oral three times a day PRN Diarrhea  polyethylene glycol 3350 17 Gram(s) Oral daily PRN Constipation  psyllium Powder 1 Packet(s) Oral daily PRN Constipation  SIMETHICONE SOFTGELS 250 milliGRAM(s),SIMETHICONE SOFTGELS 250 milliGRAM(s) 250 milliGRAM(s) Oral three times a day PRN for gas  sodium chloride 0.65% Nasal 1 Spray(s) Both Nostrils every 8 hours PRN Nasal Congestion

## 2023-11-22 NOTE — CHART NOTE - NSCHARTNOTEFT_GEN_A_CORE
IDT conference on 11/22  Social Work: Approved with insurance til 11/26  SLP: --  OT: Sup for UBD/CGA. Min A for LBD. Total A for all other ADLs/transfers.  PT: Mod A for transfers with SB. 5 steps in parallel bars with Max A of 3. 150ft WC mobility with CGA.   Amb 50 ft with hoyerlift and cardiac walker.   RT: --  DME: [facility], SB, hip kit, hospital bed, WC.   Barriers: Weakness.   Functional level on DC: Min A with SB transfers.   TDD: 12/6 to home

## 2023-11-23 LAB
ALBUMIN SERPL ELPH-MCNC: 2.7 G/DL — LOW (ref 3.3–5)
ALBUMIN SERPL ELPH-MCNC: 2.7 G/DL — LOW (ref 3.3–5)
ALP SERPL-CCNC: 183 U/L — HIGH (ref 40–120)
ALP SERPL-CCNC: 183 U/L — HIGH (ref 40–120)
ALT FLD-CCNC: 74 U/L — HIGH (ref 10–45)
ALT FLD-CCNC: 74 U/L — HIGH (ref 10–45)
ANION GAP SERPL CALC-SCNC: 8 MMOL/L — SIGNIFICANT CHANGE UP (ref 5–17)
ANION GAP SERPL CALC-SCNC: 8 MMOL/L — SIGNIFICANT CHANGE UP (ref 5–17)
AST SERPL-CCNC: 30 U/L — SIGNIFICANT CHANGE UP (ref 10–40)
AST SERPL-CCNC: 30 U/L — SIGNIFICANT CHANGE UP (ref 10–40)
BASOPHILS # BLD AUTO: 0.02 K/UL — SIGNIFICANT CHANGE UP (ref 0–0.2)
BASOPHILS # BLD AUTO: 0.02 K/UL — SIGNIFICANT CHANGE UP (ref 0–0.2)
BASOPHILS NFR BLD AUTO: 0.2 % — SIGNIFICANT CHANGE UP (ref 0–2)
BASOPHILS NFR BLD AUTO: 0.2 % — SIGNIFICANT CHANGE UP (ref 0–2)
BILIRUB SERPL-MCNC: 0.8 MG/DL — SIGNIFICANT CHANGE UP (ref 0.2–1.2)
BILIRUB SERPL-MCNC: 0.8 MG/DL — SIGNIFICANT CHANGE UP (ref 0.2–1.2)
BUN SERPL-MCNC: 21 MG/DL — SIGNIFICANT CHANGE UP (ref 7–23)
BUN SERPL-MCNC: 21 MG/DL — SIGNIFICANT CHANGE UP (ref 7–23)
CALCIUM SERPL-MCNC: 9.3 MG/DL — SIGNIFICANT CHANGE UP (ref 8.4–10.5)
CALCIUM SERPL-MCNC: 9.3 MG/DL — SIGNIFICANT CHANGE UP (ref 8.4–10.5)
CHLORIDE SERPL-SCNC: 102 MMOL/L — SIGNIFICANT CHANGE UP (ref 96–108)
CHLORIDE SERPL-SCNC: 102 MMOL/L — SIGNIFICANT CHANGE UP (ref 96–108)
CO2 SERPL-SCNC: 28 MMOL/L — SIGNIFICANT CHANGE UP (ref 22–31)
CO2 SERPL-SCNC: 28 MMOL/L — SIGNIFICANT CHANGE UP (ref 22–31)
CREAT SERPL-MCNC: 0.53 MG/DL — SIGNIFICANT CHANGE UP (ref 0.5–1.3)
CREAT SERPL-MCNC: 0.53 MG/DL — SIGNIFICANT CHANGE UP (ref 0.5–1.3)
EGFR: 103 ML/MIN/1.73M2 — SIGNIFICANT CHANGE UP
EGFR: 103 ML/MIN/1.73M2 — SIGNIFICANT CHANGE UP
EOSINOPHIL # BLD AUTO: 0.02 K/UL — SIGNIFICANT CHANGE UP (ref 0–0.5)
EOSINOPHIL # BLD AUTO: 0.02 K/UL — SIGNIFICANT CHANGE UP (ref 0–0.5)
EOSINOPHIL NFR BLD AUTO: 0.2 % — SIGNIFICANT CHANGE UP (ref 0–6)
EOSINOPHIL NFR BLD AUTO: 0.2 % — SIGNIFICANT CHANGE UP (ref 0–6)
GLUCOSE SERPL-MCNC: 134 MG/DL — HIGH (ref 70–99)
GLUCOSE SERPL-MCNC: 134 MG/DL — HIGH (ref 70–99)
HCT VFR BLD CALC: 32.6 % — LOW (ref 34.5–45)
HCT VFR BLD CALC: 32.6 % — LOW (ref 34.5–45)
HGB BLD-MCNC: 10.1 G/DL — LOW (ref 11.5–15.5)
HGB BLD-MCNC: 10.1 G/DL — LOW (ref 11.5–15.5)
IMM GRANULOCYTES NFR BLD AUTO: 1.1 % — HIGH (ref 0–0.9)
IMM GRANULOCYTES NFR BLD AUTO: 1.1 % — HIGH (ref 0–0.9)
LYMPHOCYTES # BLD AUTO: 1.05 K/UL — SIGNIFICANT CHANGE UP (ref 1–3.3)
LYMPHOCYTES # BLD AUTO: 1.05 K/UL — SIGNIFICANT CHANGE UP (ref 1–3.3)
LYMPHOCYTES # BLD AUTO: 10.6 % — LOW (ref 13–44)
LYMPHOCYTES # BLD AUTO: 10.6 % — LOW (ref 13–44)
MCHC RBC-ENTMCNC: 28.1 PG — SIGNIFICANT CHANGE UP (ref 27–34)
MCHC RBC-ENTMCNC: 28.1 PG — SIGNIFICANT CHANGE UP (ref 27–34)
MCHC RBC-ENTMCNC: 31 GM/DL — LOW (ref 32–36)
MCHC RBC-ENTMCNC: 31 GM/DL — LOW (ref 32–36)
MCV RBC AUTO: 90.8 FL — SIGNIFICANT CHANGE UP (ref 80–100)
MCV RBC AUTO: 90.8 FL — SIGNIFICANT CHANGE UP (ref 80–100)
MONOCYTES # BLD AUTO: 0.71 K/UL — SIGNIFICANT CHANGE UP (ref 0–0.9)
MONOCYTES # BLD AUTO: 0.71 K/UL — SIGNIFICANT CHANGE UP (ref 0–0.9)
MONOCYTES NFR BLD AUTO: 7.2 % — SIGNIFICANT CHANGE UP (ref 2–14)
MONOCYTES NFR BLD AUTO: 7.2 % — SIGNIFICANT CHANGE UP (ref 2–14)
NEUTROPHILS # BLD AUTO: 7.95 K/UL — HIGH (ref 1.8–7.4)
NEUTROPHILS # BLD AUTO: 7.95 K/UL — HIGH (ref 1.8–7.4)
NEUTROPHILS NFR BLD AUTO: 80.7 % — HIGH (ref 43–77)
NEUTROPHILS NFR BLD AUTO: 80.7 % — HIGH (ref 43–77)
NRBC # BLD: 0 /100 WBCS — SIGNIFICANT CHANGE UP (ref 0–0)
NRBC # BLD: 0 /100 WBCS — SIGNIFICANT CHANGE UP (ref 0–0)
PLATELET # BLD AUTO: 273 K/UL — SIGNIFICANT CHANGE UP (ref 150–400)
PLATELET # BLD AUTO: 273 K/UL — SIGNIFICANT CHANGE UP (ref 150–400)
POTASSIUM SERPL-MCNC: 3.5 MMOL/L — SIGNIFICANT CHANGE UP (ref 3.5–5.3)
POTASSIUM SERPL-MCNC: 3.5 MMOL/L — SIGNIFICANT CHANGE UP (ref 3.5–5.3)
POTASSIUM SERPL-SCNC: 3.5 MMOL/L — SIGNIFICANT CHANGE UP (ref 3.5–5.3)
POTASSIUM SERPL-SCNC: 3.5 MMOL/L — SIGNIFICANT CHANGE UP (ref 3.5–5.3)
PROT SERPL-MCNC: 5.3 G/DL — LOW (ref 6–8.3)
PROT SERPL-MCNC: 5.3 G/DL — LOW (ref 6–8.3)
RBC # BLD: 3.59 M/UL — LOW (ref 3.8–5.2)
RBC # BLD: 3.59 M/UL — LOW (ref 3.8–5.2)
RBC # FLD: 15.7 % — HIGH (ref 10.3–14.5)
RBC # FLD: 15.7 % — HIGH (ref 10.3–14.5)
SODIUM SERPL-SCNC: 138 MMOL/L — SIGNIFICANT CHANGE UP (ref 135–145)
SODIUM SERPL-SCNC: 138 MMOL/L — SIGNIFICANT CHANGE UP (ref 135–145)
WBC # BLD: 9.86 K/UL — SIGNIFICANT CHANGE UP (ref 3.8–10.5)
WBC # BLD: 9.86 K/UL — SIGNIFICANT CHANGE UP (ref 3.8–10.5)
WBC # FLD AUTO: 9.86 K/UL — SIGNIFICANT CHANGE UP (ref 3.8–10.5)
WBC # FLD AUTO: 9.86 K/UL — SIGNIFICANT CHANGE UP (ref 3.8–10.5)

## 2023-11-23 PROCEDURE — 99232 SBSQ HOSP IP/OBS MODERATE 35: CPT

## 2023-11-23 RX ADMIN — GABAPENTIN 300 MILLIGRAM(S): 400 CAPSULE ORAL at 08:46

## 2023-11-23 RX ADMIN — Medication 1 TABLET(S): at 20:04

## 2023-11-23 RX ADMIN — ZINC OXIDE 1 APPLICATION(S): 200 OINTMENT TOPICAL at 08:35

## 2023-11-23 RX ADMIN — NYSTATIN CREAM 1 APPLICATION(S): 100000 CREAM TOPICAL at 08:35

## 2023-11-23 RX ADMIN — Medication 44 MILLIGRAM(S): at 08:45

## 2023-11-23 RX ADMIN — SODIUM CHLORIDE 5 MILLILITER(S): 9 INJECTION INTRAMUSCULAR; INTRAVENOUS; SUBCUTANEOUS at 08:35

## 2023-11-23 RX ADMIN — Medication 6 MILLIGRAM(S): at 20:02

## 2023-11-23 RX ADMIN — Medication 500 MILLIGRAM(S): at 20:03

## 2023-11-23 RX ADMIN — APIXABAN 5 MILLIGRAM(S): 2.5 TABLET, FILM COATED ORAL at 08:45

## 2023-11-23 RX ADMIN — Medication 1 MILLIGRAM(S): at 20:03

## 2023-11-23 RX ADMIN — GABAPENTIN 300 MILLIGRAM(S): 400 CAPSULE ORAL at 14:28

## 2023-11-23 RX ADMIN — GABAPENTIN 300 MILLIGRAM(S): 400 CAPSULE ORAL at 20:02

## 2023-11-23 RX ADMIN — Medication 1 TABLET(S): at 08:45

## 2023-11-23 RX ADMIN — ATOVAQUONE 1500 MILLIGRAM(S): 750 SUSPENSION ORAL at 20:05

## 2023-11-23 RX ADMIN — Medication 1 TABLET(S): at 20:02

## 2023-11-23 RX ADMIN — Medication 12.5 MILLIGRAM(S): at 08:45

## 2023-11-23 RX ADMIN — Medication 12.5 MILLIGRAM(S): at 20:04

## 2023-11-23 RX ADMIN — APIXABAN 5 MILLIGRAM(S): 2.5 TABLET, FILM COATED ORAL at 20:02

## 2023-11-23 RX ADMIN — Medication 1 DROP(S): at 20:08

## 2023-11-23 RX ADMIN — ZINC OXIDE 1 APPLICATION(S): 200 OINTMENT TOPICAL at 12:30

## 2023-11-23 NOTE — PROGRESS NOTE ADULT - SUBJECTIVE AND OBJECTIVE BOX
Follow-up Pulmonary Progress Note  Chief Complaint : Interstitial lung disease          No new events overnight.  Denies SOB/CP.   did not want overnight pulse oxymetery as has alarm that she cant turn off  patient is comfortable  and on room air when seen  states when kept head of bed elevated her machine did not have as many hypoxia episodes overnight     Allergies :No Known Allergies      PAST MEDICAL & SURGICAL HISTORY:  Afib    Sciatica    Interstitial lung disease    Lymphedema        Medications:  MEDICATIONS  (STANDING):  apixaban 5 milliGRAM(s) Oral every 12 hours  artificial  tears Solution 1 Drop(s) Both EYES every 12 hours  ascorbic acid 500 milliGRAM(s) Oral daily  atovaquone  Suspension 1500 milliGRAM(s) Oral daily  dextrose 5%. 1000 milliLiter(s) (50 mL/Hr) IV Continuous <Continuous>  dextrose 5%. 1000 milliLiter(s) (100 mL/Hr) IV Continuous <Continuous>  dextrose 50% Injectable 12.5 Gram(s) IV Push once  dextrose 50% Injectable 25 Gram(s) IV Push once  dextrose 50% Injectable 25 Gram(s) IV Push once  folic acid 1 milliGRAM(s) Oral daily  gabapentin 300 milliGRAM(s) Oral three times a day  glucagon  Injectable 1 milliGRAM(s) IntraMuscular once  lactobacillus acidophilus 1 Tablet(s) Oral two times a day with meals  melatonin 6 milliGRAM(s) Oral at bedtime  methylPREDNISolone 44 milliGRAM(s) Oral daily  metoprolol tartrate 12.5 milliGRAM(s) Oral two times a day  multivitamin 1 Tablet(s) Oral daily  nystatin Powder 1 Application(s) Topical two times a day  pantoprazole    Tablet 40 milliGRAM(s) Oral before breakfast  sodium chloride 0.9% lock flush 5 milliLiter(s) IV Push two times a day  zinc oxide 40% Paste 1 Application(s) Topical three times a day    MEDICATIONS  (PRN):  albuterol/ipratropium for Nebulization 3 milliLiter(s) Nebulizer every 6 hours PRN Shortness of Breath and/or Wheezing  ALPRAZolam 0.25 milliGRAM(s) Oral every 6 hours PRN Anxiety  aluminum hydroxide/magnesium hydroxide/simethicone Suspension 30 milliLiter(s) Oral every 4 hours PRN Dyspepsia  ammonium lactate 12% Lotion 1 Application(s) Topical every 12 hours PRN BL feet dryness  benzocaine/menthol Lozenge 1 Lozenge Oral two times a day PRN Sore Throat  benzonatate 100 milliGRAM(s) Oral three times a day PRN Cough  bisacodyl 5 milliGRAM(s) Oral <User Schedule> PRN Constipation  dextrose Oral Gel 15 Gram(s) Oral once PRN Blood Glucose LESS THAN 70 milliGRAM(s)/deciliter  hydrocortisone hemorrhoidal Suppository 1 Suppository(s) Rectal two times a day PRN Hemorrhoids  loperamide 2 milliGRAM(s) Oral three times a day PRN Diarrhea  polyethylene glycol 3350 17 Gram(s) Oral daily PRN Constipation  psyllium Powder 1 Packet(s) Oral daily PRN Constipation  SIMETHICONE SOFTGELS 250 milliGRAM(s),SIMETHICONE SOFTGELS 250 milliGRAM(s) 250 milliGRAM(s) Oral three times a day PRN for gas  sodium chloride 0.65% Nasal 1 Spray(s) Both Nostrils every 8 hours PRN Nasal Congestion         GI Medications  bisacodyl 5 milliGRAM(s) Oral <User Schedule>, 11-21-23 @ 09:11,  PRN  psyllium Powder 1 Packet(s) Oral daily, 11-21-23 @ 09:11,  PRN        LABS:                        10.1   9.86  )-----------( 273      ( 23 Nov 2023 05:00 )             32.6     11-23    138  |  102  |  21  ----------------------------<  134<H>  3.5   |  28  |  0.53    Ca    9.3      23 Nov 2023 05:00    TPro  5.3<L>  /  Alb  2.7<L>  /  TBili  0.8  /  DBili  x   /  AST  30  /  ALT  74<H>  /  AlkPhos  183<H>  11-23         VITALS:  T(C): 36.2 (11-23-23 @ 08:59), Max: 36.9 (11-22-23 @ 21:05)  T(F): 97.2 (11-23-23 @ 08:59), Max: 98.5 (11-22-23 @ 21:05)  HR: 79 (11-23-23 @ 08:59) (79 - 87)  BP: 108/62 (11-23-23 @ 08:59) (108/62 - 116/72)  BP(mean): --  ABP: --  ABP(mean): --  RR: 16 (11-23-23 @ 08:59) (16 - 16)  SpO2: 94% (11-23-23 @ 08:59) (94% - 94%)  CVP(mm Hg): --  CVP(cm H2O): --    Ins and Outs

## 2023-11-23 NOTE — PROGRESS NOTE ADULT - SUBJECTIVE AND OBJECTIVE BOX
Medicine Progress Note    Patient is a 62y old  Female who presents with a chief complaint of Interstitial Lung Disease (23 Nov 2023 11:17)      SUBJECTIVE / OVERNIGHT EVENTS:  seen and examined  Chart reviewed  No overnight events  Limb weakness improving with therapy  BM+  No pain  No complaints  does not need oxygen during the day. needs O2 while sleeping. O2 sat 84-96% on her pulse ox at night    ADDITIONAL REVIEW OF SYSTEMS  denied fever/chills/CP/SOB/cough/palpitation/dizziness/abdominal pian/nausea/vomiting/diarrhoea/constipation/dysuria/leg or calf pain/headaches.all other ROS neg    MEDICATIONS  (STANDING):  apixaban 5 milliGRAM(s) Oral every 12 hours  artificial  tears Solution 1 Drop(s) Both EYES every 12 hours  ascorbic acid 500 milliGRAM(s) Oral daily  atovaquone  Suspension 1500 milliGRAM(s) Oral daily  dextrose 5%. 1000 milliLiter(s) (50 mL/Hr) IV Continuous <Continuous>  dextrose 5%. 1000 milliLiter(s) (100 mL/Hr) IV Continuous <Continuous>  dextrose 50% Injectable 12.5 Gram(s) IV Push once  dextrose 50% Injectable 25 Gram(s) IV Push once  dextrose 50% Injectable 25 Gram(s) IV Push once  folic acid 1 milliGRAM(s) Oral daily  gabapentin 300 milliGRAM(s) Oral three times a day  glucagon  Injectable 1 milliGRAM(s) IntraMuscular once  lactobacillus acidophilus 1 Tablet(s) Oral two times a day with meals  melatonin 6 milliGRAM(s) Oral at bedtime  methylPREDNISolone 44 milliGRAM(s) Oral daily  metoprolol tartrate 12.5 milliGRAM(s) Oral two times a day  multivitamin 1 Tablet(s) Oral daily  nystatin Powder 1 Application(s) Topical two times a day  pantoprazole    Tablet 40 milliGRAM(s) Oral before breakfast  sodium chloride 0.9% lock flush 5 milliLiter(s) IV Push two times a day  zinc oxide 40% Paste 1 Application(s) Topical three times a day    MEDICATIONS  (PRN):  albuterol/ipratropium for Nebulization 3 milliLiter(s) Nebulizer every 6 hours PRN Shortness of Breath and/or Wheezing  ALPRAZolam 0.25 milliGRAM(s) Oral every 6 hours PRN Anxiety  aluminum hydroxide/magnesium hydroxide/simethicone Suspension 30 milliLiter(s) Oral every 4 hours PRN Dyspepsia  ammonium lactate 12% Lotion 1 Application(s) Topical every 12 hours PRN BL feet dryness  benzocaine/menthol Lozenge 1 Lozenge Oral two times a day PRN Sore Throat  benzonatate 100 milliGRAM(s) Oral three times a day PRN Cough  bisacodyl 5 milliGRAM(s) Oral <User Schedule> PRN Constipation  dextrose Oral Gel 15 Gram(s) Oral once PRN Blood Glucose LESS THAN 70 milliGRAM(s)/deciliter  hydrocortisone hemorrhoidal Suppository 1 Suppository(s) Rectal two times a day PRN Hemorrhoids  loperamide 2 milliGRAM(s) Oral three times a day PRN Diarrhea  polyethylene glycol 3350 17 Gram(s) Oral daily PRN Constipation  psyllium Powder 1 Packet(s) Oral daily PRN Constipation  SIMETHICONE SOFTGELS 250 milliGRAM(s),SIMETHICONE SOFTGELS 250 milliGRAM(s) 250 milliGRAM(s) Oral three times a day PRN for gas  sodium chloride 0.65% Nasal 1 Spray(s) Both Nostrils every 8 hours PRN Nasal Congestion    CAPILLARY BLOOD GLUCOSE        I&O's Summary      PHYSICAL EXAM:  Vital Signs Last 24 Hrs  T(C): 36.2 (23 Nov 2023 08:59), Max: 36.9 (22 Nov 2023 21:05)  T(F): 97.2 (23 Nov 2023 08:59), Max: 98.5 (22 Nov 2023 21:05)  HR: 79 (23 Nov 2023 08:59) (79 - 87)  BP: 108/62 (23 Nov 2023 08:59) (108/62 - 116/72)  BP(mean): --  RR: 16 (23 Nov 2023 08:59) (16 - 16)  SpO2: 94% (23 Nov 2023 08:59) (94% - 94%)    Parameters below as of 23 Nov 2023 08:59  Patient On (Oxygen Delivery Method): room air    GENERAL: Not in distress. Alert    HEENT: clear conjuctiva, MMM. no pallor or icterus  CARDIOVASCULAR: RRR S1, S2. No murmur/rubs/gallop  LUNGS: BLAE reduced, no rales, no wheezing, no rhonchi.    ABDOMEN: ND. Soft,  NT, no guarding / rebound / rigidity. BS normoactive  BACK: No spine tenderness.  EXTREMITIES: no edema. no leg or calf TP.  SKIN: warm and dry  PSYCHIATRIC: Calm.  No agitation.  CNS: AAO. moves limbs, follows commands    LABS:                        10.1   9.86  )-----------( 273      ( 23 Nov 2023 05:00 )             32.6     11-23    138  |  102  |  21  ----------------------------<  134<H>  3.5   |  28  |  0.53    Ca    9.3      23 Nov 2023 05:00    TPro  5.3<L>  /  Alb  2.7<L>  /  TBili  0.8  /  DBili  x   /  AST  30  /  ALT  74<H>  /  AlkPhos  183<H>  11-23          Urinalysis Basic - ( 23 Nov 2023 05:00 )    Color: x / Appearance: x / SG: x / pH: x  Gluc: 134 mg/dL / Ketone: x  / Bili: x / Urobili: x   Blood: x / Protein: x / Nitrite: x   Leuk Esterase: x / RBC: x / WBC x   Sq Epi: x / Non Sq Epi: x / Bacteria: x        COVID-19 PCR: NotDetec (05 Oct 2023 20:17)  SARS-CoV-2: NotDetec (30 Sep 2023 12:52)  SARS-CoV-2: NotDetec (13 Sep 2023 17:55)  SARS-CoV-2: NotDetec (10 Sep 2023 11:24)  SARS-CoV-2: NotDetec (26 Aug 2023 12:55)      RADIOLOGY & ADDITIONAL TESTS:  Imaging from Last 24 Hours:    Electrocardiogram/QTc Interval:    COORDINATION OF CARE:  Care Discussed with Consultants/Other Providers:

## 2023-11-23 NOTE — PROGRESS NOTE ADULT - ASSESSMENT
62F with AF, hx sciatica, newly diagnosed ILD (likely associated with MCTD +ARIANA, +RNP, +Sm) with exacerbation requiring ECMO / plasmapharesis / Rituximab / steriods, now at acute rehab      #abdominal discomfort  - resolved  - abd xray: non-specific gas. reviewed by me. f/u final result  - supportive care     #Transaminitis  - LFT trending down.  - limit Tylenol use, avoid statin and other hepatoxic meds  - monitor    #chronic macrocytic anemia  - H/H remains stable  - on MVI, folate and thiamine    #hyponatremia,   -resolved     #leukocytosis due to steroid use,   - improving  - non-focal    #Interstitial Lung Disease  #Mixed connective tissue disease  #Suspect critical illness myopathy from prolonged ICU stay, resolved  - CT 11/10 new predominant peripheral reticular opacities, within lung paranchyma; no significant bronchiectasis or honeycombing.   -c/w steroids - taper as scheduled (2 week intervals)   -c/w nebs  -c/w PT/OT  -Mepron for PCP ppx while on steroids   - check ambulatory O2 sat periodically    #PAF  #Non-occlusive right IJ thrombus   -c/w Eliquis  -c/w lopressor 12.5mg po BID  - goal HR for pAfib is <110    #Anxiety  -appreciate psych follow-up  -c/w Xanax PRN    # ?JACEY  # nocturnal desat  - discussed about CPAP use. oxygen use during sleep and PRN. please check nocturnal O2 sat during sleep and document. patient may need OP sleep study. patient is aware. she will speak to her pulm. informed RN to check ambulatory O2 sat during therapy and at rest.     #  Severe protein-calorie malnutrition.  - nutrition on board    #DVT ppx: Eliquis    d/w rehab team

## 2023-11-23 NOTE — PROGRESS NOTE ADULT - SUBJECTIVE AND OBJECTIVE BOX
Cc: Impaired mobility     HPI: Patient with no new medical issues today.  Pain controlled, no chest pain, no N/V, no Fevers/Chills. No other new ROS.  Reports having bowel movements.   Has been tolerating rehabilitation program.    albuterol/ipratropium for Nebulization 3 milliLiter(s) Nebulizer every 6 hours PRN  ALPRAZolam 0.25 milliGRAM(s) Oral every 6 hours PRN  aluminum hydroxide/magnesium hydroxide/simethicone Suspension 30 milliLiter(s) Oral every 4 hours PRN  ammonium lactate 12% Lotion 1 Application(s) Topical every 12 hours PRN  apixaban 5 milliGRAM(s) Oral every 12 hours  artificial  tears Solution 1 Drop(s) Both EYES every 12 hours  ascorbic acid 500 milliGRAM(s) Oral daily  atovaquone  Suspension 1500 milliGRAM(s) Oral daily  benzocaine/menthol Lozenge 1 Lozenge Oral two times a day PRN  benzonatate 100 milliGRAM(s) Oral three times a day PRN  bisacodyl 5 milliGRAM(s) Oral <User Schedule> PRN  dextrose 5%. 1000 milliLiter(s) IV Continuous <Continuous>  dextrose 5%. 1000 milliLiter(s) IV Continuous <Continuous>  dextrose 50% Injectable 25 Gram(s) IV Push once  dextrose 50% Injectable 25 Gram(s) IV Push once  dextrose 50% Injectable 12.5 Gram(s) IV Push once  dextrose Oral Gel 15 Gram(s) Oral once PRN  folic acid 1 milliGRAM(s) Oral daily  gabapentin 300 milliGRAM(s) Oral three times a day  glucagon  Injectable 1 milliGRAM(s) IntraMuscular once  hydrocortisone hemorrhoidal Suppository 1 Suppository(s) Rectal two times a day PRN  lactobacillus acidophilus 1 Tablet(s) Oral two times a day with meals  loperamide 2 milliGRAM(s) Oral three times a day PRN  melatonin 6 milliGRAM(s) Oral at bedtime  methylPREDNISolone 44 milliGRAM(s) Oral daily  metoprolol tartrate 12.5 milliGRAM(s) Oral two times a day  multivitamin 1 Tablet(s) Oral daily  nystatin Powder 1 Application(s) Topical two times a day  pantoprazole    Tablet 40 milliGRAM(s) Oral before breakfast  polyethylene glycol 3350 17 Gram(s) Oral daily PRN  psyllium Powder 1 Packet(s) Oral daily PRN  SIMETHICONE SOFTGELS 250 milliGRAM(s),SIMETHICONE SOFTGELS 250 milliGRAM(s) 250 milliGRAM(s) Oral three times a day PRN  sodium chloride 0.65% Nasal 1 Spray(s) Both Nostrils every 8 hours PRN  sodium chloride 0.9% lock flush 5 milliLiter(s) IV Push two times a day  zinc oxide 40% Paste 1 Application(s) Topical three times a day      T(C): 36.2 (11-23-23 @ 08:59), Max: 36.9 (11-22-23 @ 21:05)  HR: 79 (11-23-23 @ 08:59) (79 - 87)  BP: 108/62 (11-23-23 @ 08:59) (108/62 - 116/72)  RR: 16 (11-23-23 @ 08:59) (16 - 16)  SpO2: 94% (11-23-23 @ 08:59) (94% - 94%)    In NAD  HEENT- EOMI  Heart- No cyanosis   Lungs- No respiratory distress   Abd- + BS, NT  Ext- No calf pain  Neuro- Exam unchanged                          10.1   9.86  )-----------( 273      ( 23 Nov 2023 05:00 )             32.6     11-23    138  |  102  |  21  ----------------------------<  134<H>  3.5   |  28  |  0.53    Ca    9.3      23 Nov 2023 05:00    TPro  5.3<L>  /  Alb  2.7<L>  /  TBili  0.8  /  DBili  x   /  AST  30  /  ALT  74<H>  /  AlkPhos  183<H>  11-23        Imp: Patient with diagnosis of Interstitial Lung Disease and Mixed connective tissue disease admitted for comprehensive acute rehabilitation.    Plan:  -A-fib - metoprolol  -Neuropathy - gabapentin   Impaired mobility - Continue PT/OT  - DVT prophylaxis - apixaban   - Skin- Turn q2h, check skin daily  - Continue current medications; patient medically stable.   - Patient is stable to continue current rehabilitation program.

## 2023-11-23 NOTE — PROGRESS NOTE ADULT - ASSESSMENT
Physical Examination:  GENERAL:                Alert, Oriented, No acute distress.     PULM:                     Bilateral air entry,  poor air entry at bases No Rales, No Rhonchi, No Wheezing  CVS:                         S1, S2,  No Murmur   EXT:                        Trace non pitting edema, nontender, No Cyanosis or Clubbing   NEURO:                  Alert, oriented, interactive, bilateral upper and lower extremity weakness   PSYC:                      Calm, + Insight.      Assessment  62F PMH A-Fib with recently diagnosed MCTD-ILD (+ARIANA 1:1280, RNP>8, Sm>8) who was admitted to [facility] with acute hypoxemic respiratory failure that initially responded to steroids, then with worsening after taper with development of acute hypoxemic and hypercapnic respiratory failure requiring intubation and VV- ECMO on 9/13, then transferred to [facility], concerning for DAH vs ILD flare, decannulated from VV-ECMO 9/21, extubated 9/22. S/p pulse steroids, PLEX (9/15, 9/18, 9/20) and Rituximab (9/16 & 10/3). Course c/b severe leukopenia s/p filgastrim, shock liver with slow to resolved hyperbilirubinemia, RIJ DVT, oropharyngeal dysphagia, and recurrent SVT. Repeat CT chest on 9/30 with markedly improved bilateral groundglass opacities with resolved lung consolidations. Now in acute rehab on 4 lpm NC oxygen and tapering dose of medrol.     Problem List:  1. Interstitial lung disease   2. Mixed connective tissue disease   3. Acute hypoxia  4. RIJ DVT     Plan:  - does not want overnight pulse oximetry at this time, recommend overnight home PSG as out patient.     - Stable respiratory status, Room air now   - Cont. Methylprednisolone PO, taper as per rheumatology recs. from [facility]   - Continue atovaquone for PCP prophylaxis  - S/p Rituximab 9/16 and 10/3  - S/p PLEX during MICU stay  - Repeat CT scan reviewed, findings do not suggest acute infection but chronic infiltrative disease that is improving, no acute intervention required, will recommend out patient pulmonary follow up with repeat CT after rehab stay.  - No evidence of active infection, monitor off antibiotics, noted increased WBC, unsure if from steroids. monitor closely  - Cont. anticoagulation for DVT associated with ECMO cannula  - Consider incentive spirometry   - Care per primary team  - Discussed with spouse at bedside   - PT OOB as tolerated  - Outpatient pulmonary and rheumatology follow up upon discharge

## 2023-11-24 PROCEDURE — 99232 SBSQ HOSP IP/OBS MODERATE 35: CPT

## 2023-11-24 RX ADMIN — Medication 1 MILLIGRAM(S): at 20:01

## 2023-11-24 RX ADMIN — Medication 44 MILLIGRAM(S): at 08:27

## 2023-11-24 RX ADMIN — NYSTATIN CREAM 1 APPLICATION(S): 100000 CREAM TOPICAL at 08:22

## 2023-11-24 RX ADMIN — Medication 12.5 MILLIGRAM(S): at 19:59

## 2023-11-24 RX ADMIN — GABAPENTIN 300 MILLIGRAM(S): 400 CAPSULE ORAL at 08:27

## 2023-11-24 RX ADMIN — NYSTATIN CREAM 1 APPLICATION(S): 100000 CREAM TOPICAL at 20:01

## 2023-11-24 RX ADMIN — ZINC OXIDE 1 APPLICATION(S): 200 OINTMENT TOPICAL at 13:34

## 2023-11-24 RX ADMIN — Medication 1 TABLET(S): at 08:27

## 2023-11-24 RX ADMIN — APIXABAN 5 MILLIGRAM(S): 2.5 TABLET, FILM COATED ORAL at 19:59

## 2023-11-24 RX ADMIN — Medication 1 DROP(S): at 08:24

## 2023-11-24 RX ADMIN — GABAPENTIN 300 MILLIGRAM(S): 400 CAPSULE ORAL at 15:39

## 2023-11-24 RX ADMIN — PANTOPRAZOLE SODIUM 40 MILLIGRAM(S): 20 TABLET, DELAYED RELEASE ORAL at 08:28

## 2023-11-24 RX ADMIN — Medication 1 DROP(S): at 19:59

## 2023-11-24 RX ADMIN — SODIUM CHLORIDE 5 MILLILITER(S): 9 INJECTION INTRAMUSCULAR; INTRAVENOUS; SUBCUTANEOUS at 08:22

## 2023-11-24 RX ADMIN — Medication 1 TABLET(S): at 21:02

## 2023-11-24 RX ADMIN — ZINC OXIDE 1 APPLICATION(S): 200 OINTMENT TOPICAL at 08:22

## 2023-11-24 RX ADMIN — APIXABAN 5 MILLIGRAM(S): 2.5 TABLET, FILM COATED ORAL at 09:26

## 2023-11-24 RX ADMIN — ATOVAQUONE 1500 MILLIGRAM(S): 750 SUSPENSION ORAL at 19:58

## 2023-11-24 RX ADMIN — Medication 12.5 MILLIGRAM(S): at 08:27

## 2023-11-24 RX ADMIN — SODIUM CHLORIDE 5 MILLILITER(S): 9 INJECTION INTRAMUSCULAR; INTRAVENOUS; SUBCUTANEOUS at 20:17

## 2023-11-24 RX ADMIN — Medication 1 TABLET(S): at 19:59

## 2023-11-24 RX ADMIN — GABAPENTIN 300 MILLIGRAM(S): 400 CAPSULE ORAL at 20:15

## 2023-11-24 RX ADMIN — Medication 500 MILLIGRAM(S): at 20:02

## 2023-11-24 RX ADMIN — Medication 6 MILLIGRAM(S): at 20:16

## 2023-11-24 NOTE — PROGRESS NOTE ADULT - NS ATTEND AMEND GEN_ALL_CORE FT
Seen and examined, note revised    Reports feeling gas in abdomen,  Xray indicative of mod constipation    Labs unremarkable  Continued progress with therapy  Ambulating > 150ft with manual WC unassisted  Working on LE muscle strengthening, wt bearing   Only requiring oxy nightly  Oxy stat normal    Clinically stable   Continue therapy  Progress note to insurance to determine further approval Seen and examined, note revised    Reports feeling gas in abdomen,  Xray indicative of mod constipation    Labs unremarkable  Continued progress with therapy  Ambulating > 150ft with manual WC unassisted  Working on LE muscle strengthening, wt bearing   Marked improvement engaged actively and achieved slide board transfer bed to   Only requiring oxy nightly  Oxy stat normal    Clinically stable   Continue therapy  Progress note to insurance to determine further approval

## 2023-11-24 NOTE — PROGRESS NOTE ADULT - ASSESSMENT
Assessment/Plan:  ALIZA FOLEY is a 62 year old female with PMH of pAFIB and sciatica; who presented to [facility] ED with SOB, and was found to have groundglass opacities and interstitial lung disease. She was treated with empiric Vancomycin and Zosyn. She underwent a bronchoscopy with bronchoalveolar lavage and pulse steroids. She was given IV diuretics for increased pulmonary congestion, but her respiratory status continued to worsen.  On 9/13, she was transferred to [facility] for ECMO requirements. She was further treated with Plasmapheresis, Rituximab and high-dose steroids, with significant improvement. Her overall hospital course was complicated by thrombocytopenia (s/p several platelet transfusions), leukocytosis (infectious workup entirely negative- s/p Vanco and Ceftriaxone), AFIB with RVR (resolved with Metoprolol), dysphagia (requiring NGT), transaminitis (secondary to acute illness and hypotension),  non-occlusive RIJ DVT (started on Lovenox). Patient now admitted for a multidisciplinary rehab program. 10-05-23 @ 13:18    * Sustained functional improvement including progress with Wt bearing LE and improvement with ability at transfers, concentrate on prox LE muscle strengthening  * Peer to Peer completed, insurance approval retrospectively given to cover from 11/14 to 11/20, Insurance co awaits updated notes to determine further approval of coverage   * Abdominal XR to rule out ileus- shown to have moderate stool burden    #Interstitial Lung Disease and Mixed connective tissue disease  - Gait Instability, ADL impairments and Functional impairments: start Comprehensive Rehab Program of PT/OT/SLP - 3 hours a day, 5 days a week  - P&O as needed   - Non-specific Interstitial Lung Disease  - Requiring ECMO   - Improvement s/p Plasmapheresis, Rituximab and high- dose steroids   - Albuterol/Ipratropium Nebulizer every 6 hours  - Mepron 1500mg daily  - PO Medrol ( due to liver dysfunction): Plan will be to taper by ~20% every 2 weeks    Medrol   64mg x 2 weeks   56mg x 2 weeks  44mg x 2 weeks   36mg x 2 weeks - Follow up Outpatient   - Plan to wean 02 as tolerated, Continue tapering oxygen,  -  CT Chest noted 11/10---Resp f/u review  11/14, appreciated  They reports sustained improvement, clinical (normal oxy sats on R/A, sustained progress with oxy tapering), and radiological (no acute findings on recent CT Chest, rather residual chronic infiltrative diesease noted, with recommendation to repeat CT after Acute rehab treatment     #Posttnasal drip--ENT review 11/7, declined scope, continue flonase     #pAFIB/Rt Ij thrombus  - Metoprolol 25mg BID and lovenox  --- Eliquis 5mg BID - tolerating well     # Chronic Tinnitus   - ENT review and recs appreciate, Patient already made ENT appointment for f/u    # Lymphedema both legs -chronic and Rt IJ thrombus  - ACE Wrap BL LEs  - BL LE doppler negative for DVT  - BL UE doppler with chronic thrombotic changes in RIJ     #Transaminitis --LFT Down trending   - Secondary to acute illness and hypotension at [facility]  - Outpatient follow up     #Leucocytosis--steroid related, continue monitoring     #Neuropathy  - Gabapentin 300 mg BID, will consider increasing dose, increase to TID 11/10  --Work on motor strengthening, priority for UE as preferred by patient   - Vit b12 >2,000 in 9/2023  --Will consider Rhuematology f/u if needed    #Sleep/Mood  - Melatonin 6mg at HS  - Psych rec Xanax 0.25mg PRN    #Skin  - Skin: Left lower abdomen ruptured blister 3.0 x 1.0, stage 2 pressure injury to right buttock 4 x1 and left buttock 3x1, stage 2 pressure injury ti right inner thigh 0.2 x 0.2, right groin wound 10.5 x 2.0,   Wound care review, Left groin wound 11/15--- 0.6x1.8.0.1cm, no slough, healthy base  -- MAD to skin folds- Nystatin to submammary and abdominal pannus BID  -- MAD to L groin- cleanse with NS, Triad cream daily  -- Mad to R groin- Nystatin powder daily   -- R groin wound-- aquacel packing, Triad to periwound, Allevyn foam- daily and PRN   - Pressure injury/Skin: OOB to Chair, PT/OT   - Offload pressure, low air loss support surfaces, T&P every 2 hours   --Wound care consult-10/9 -Multiple wounds--perineal and buttock/sacral, recs appreciated   --Suggest Continue treatments as below:   Twice daily & PRN Normal Saline Cleanse of abdominal pannus & breast folds, perineal, Left & Right inner buttock erosions.  Pat thoroughly dry.   Apply Desitin generously twice daily (& PRN) to perineal, buttocks, and left groin erosions, leave open to air.    Apply Nystatin powder to abdominal pannus and breast folds.  Suggest use of Interdry cloths in folds to 'wick' moisture.  Suggest daily Normal Saline Cleanse of right groin surgical wound, pat dry.  Apply Cavillon film barrier to intact periwound skin.  Place Aquacell strip over open area, cover with foam dressing.  - Wound care following     #Pain Mgmt   - Tylenol PRN   - Gabapentin 300mg at HS     #GI/Bowel Mgmt   - Pantoprazole  - Senna  --on hold due to recent Loose BM  - PRN: Maalox   - Stool count  --Lactobacillus BID   -- AB XR mod stool burden on transverse colon 10/23--still has mod stool burden, but moving bowel appropriately  - S/p enema and lactulose  - Abdominal XR to rule out ileus - shown to have moderate stool burden    #/Bladder Mgmt   -Spontaneously voiding   - UA (11/3) negative    #FEN   #Dysphagia  - Diet - Minced & Moist  [CC]    - Dysphagia- SLP evaluation     #Health maintenance  - Folic Acid   - MVI  - Ocean nasal spray    # Difficulty with access to peripheral veins-- Blood draws from Left arm Medline     #Precautions / PROPHYLAXIS:   - Falls  - ortho: Weight bearing status: WBAT   - Lungs: Aspiration, Incentive Spirometer   - DVT PPX: Eliquis 5 mg BID   ----------------------------------------  11/20 --Labs CBC mild anemia, normal renal function, LFT elevated, downtrending   ----------------------------------------  Liaison with other providers/agencies    PEER TO PEER with Dr Tripp (patient's insurance company)  * Peer to Peer completed, insurance approval retrospectively given to cover from 11/14 to 11/20, Insurance co awaits updated notes to determine further approval of coverage   SW team to send updated clinical and functional notes to medical insurance company  ----------------------------------------  IDT conference on 11/22  Social Work: Approved with insurance til 11/26  SLP: --  OT: Sup for UBD/CGA. Min A for LBD. Total A for all other ADLs/transfers.  PT: Mod A for transfers with SB. 5 steps in parallel bars with Max A of 3. 150ft WC mobility with CGA.   Amb 50 ft with hoyerlift and cardiac walker.   RT: --  DME: Damaris, SB, hip kit, hospital bed, WC.   Barriers: Weakness.   Functional level on DC: Min A with SB transfers.   TDD: 12/6 to home.  ----------------------------------------  OUTPATIENT/FOLLOW UP:    Trae Whitney  Pulmonary Disease  [address], [address] [place] [address]  Phone: [phone]  Fax: [phone]  Follow Up Time: 2 weeks    Ryanne Allen  Rheumatology  [address] [address]  Phone: [phone]  Fax: [phone]  Follow Up Time: 1 week    Kyle Pierce  Internal Medicine  [address] [place] [address], [address]  [place] [address]  Phone: [phone]  Fax: [phone]  Follow Up Time: 2 weeks    Addy Powers  Pulmonary Disease  [address] [facility], [address]  [place], [place] [id number]  Phone: [phone]  Fax: [phone]  Follow Up Time:     Kwame Hawley  Critical Care Medicine  [address] [facility], [address]  [place] [address]  Phone: [phone]  Fax: [phone]  Follow Up Time:     Neena Borrego  Rheumatology  [address] [facility], [address]  [place] [address]  Phone: [phone]  Fax: [phone]  Follow Up Time:    [facility]  [facility] [address]  Scheduled Appointment: 09/13/2023  2:40 PM     Time spent 63 mins  Patient review, discussion with family, observation in therapy, and care co ordination  Assessment/Plan:  ALIZA FOLEY is a 62 year old female with PMH of pAFIB and sciatica; who presented to [facility] ED with SOB, and was found to have groundglass opacities and interstitial lung disease. She was treated with empiric Vancomycin and Zosyn. She underwent a bronchoscopy with bronchoalveolar lavage and pulse steroids. She was given IV diuretics for increased pulmonary congestion, but her respiratory status continued to worsen.  On 9/13, she was transferred to [facility] for ECMO requirements. She was further treated with Plasmapheresis, Rituximab and high-dose steroids, with significant improvement. Her overall hospital course was complicated by thrombocytopenia (s/p several platelet transfusions), leukocytosis (infectious workup entirely negative- s/p Vanco and Ceftriaxone), AFIB with RVR (resolved with Metoprolol), dysphagia (requiring NGT), transaminitis (secondary to acute illness and hypotension),  non-occlusive RIJ DVT (started on Lovenox). Patient now admitted for a multidisciplinary rehab program. 10-05-23 @ 13:18    * Sustained functional improvement including progress with Wt bearing LE and improvement with ability at transfers, concentrate on prox LE muscle strengthening  * Labs discussed, unremarkable   * Constipation--prefers to continue miralax prn  * Abdominal XR to rule out ileus- shown to have moderate stool burden    #Interstitial Lung Disease and Mixed connective tissue disease  - Gait Instability, ADL impairments and Functional impairments: start Comprehensive Rehab Program of PT/OT/SLP - 3 hours a day, 5 days a week  - P&O as needed   - Non-specific Interstitial Lung Disease  - Requiring ECMO   - Improvement s/p Plasmapheresis, Rituximab and high- dose steroids   - Albuterol/Ipratropium Nebulizer every 6 hours  - Mepron 1500mg daily  - PO Medrol ( due to liver dysfunction): Plan will be to taper by ~20% every 2 weeks    Medrol   64mg x 2 weeks   56mg x 2 weeks  44mg x 2 weeks   36mg x 2 weeks - Follow up Outpatient   - Plan to wean 02 as tolerated, Continue tapering oxygen,  -  CT Chest noted 11/10---Resp f/u review  11/14, appreciated  They reports sustained improvement, clinical (normal oxy sats on R/A, sustained progress with oxy tapering), and radiological (no acute findings on recent CT Chest, rather residual chronic infiltrative diesease noted, with recommendation to repeat CT after Acute rehab treatment     #Posttnasal drip--ENT review 11/7, declined scope, continue flonase     #pAFIB/Rt Ij thrombus  - Metoprolol 25mg BID and lovenox  --- Eliquis 5mg BID - tolerating well     # Chronic Tinnitus   - ENT review and recs appreciate, Patient already made ENT appointment for f/u    # Lymphedema both legs -chronic and Rt IJ thrombus  - ACE Wrap BL LEs  - BL LE doppler negative for DVT  - BL UE doppler with chronic thrombotic changes in RIJ     #Transaminitis --LFT Down trending   - Secondary to acute illness and hypotension at [facility]  - Outpatient follow up     #Leucocytosis--steroid related, continue monitoring     #Neuropathy  - Gabapentin 300 mg BID, will consider increasing dose, increase to TID 11/10  --Work on motor strengthening, priority for UE as preferred by patient   - Vit b12 >2,000 in 9/2023  --Will consider Rhuematology f/u if needed    #Sleep/Mood  - Melatonin 6mg at HS  - Psych rec Xanax 0.25mg PRN    #Skin  - Skin: Left lower abdomen ruptured blister 3.0 x 1.0, stage 2 pressure injury to right buttock 4 x1 and left buttock 3x1, stage 2 pressure injury ti right inner thigh 0.2 x 0.2, right groin wound 10.5 x 2.0,   Wound care review, Left groin wound 11/15--- 0.6x1.8.0.1cm, no slough, healthy base  -- MAD to skin folds- Nystatin to submammary and abdominal pannus BID  -- MAD to L groin- cleanse with NS, Triad cream daily  -- Mad to R groin- Nystatin powder daily   -- R groin wound-- aquacel packing, Triad to periwound, Allevyn foam- daily and PRN   - Pressure injury/Skin: OOB to Chair, PT/OT   - Offload pressure, low air loss support surfaces, T&P every 2 hours   --Wound care consult-10/9 -Multiple wounds--perineal and buttock/sacral, recs appreciated   --Suggest Continue treatments as below:   Twice daily & PRN Normal Saline Cleanse of abdominal pannus & breast folds, perineal, Left & Right inner buttock erosions.  Pat thoroughly dry.   Apply Desitin generously twice daily (& PRN) to perineal, buttocks, and left groin erosions, leave open to air.    Apply Nystatin powder to abdominal pannus and breast folds.  Suggest use of Interdry cloths in folds to 'wick' moisture.  Suggest daily Normal Saline Cleanse of right groin surgical wound, pat dry.  Apply Cavillon film barrier to intact periwound skin.  Place Aquacell strip over open area, cover with foam dressing.  - Wound care following     #Pain Mgmt   - Tylenol PRN   - Gabapentin 300mg at HS     #GI/Bowel Mgmt/Hx of alternating bowel movements  - Pantoprazole  - PRN: Maalox   --Lactobacillus BID   --* Constipation--prefers to continue miralax prn  - Abdominal XR to rule out ileus - shown to have moderate stool burden    #/Bladder Mgmt   -Spontaneously voiding   - UA (11/3) negative    #FEN   #Dysphagia  - Diet - Minced & Moist  [CC]    - Dysphagia- SLP evaluation     #Health maintenance  - Folic Acid   - MVI  - Ocean nasal spray    # Difficulty with access to peripheral veins-- Blood draws from Left arm Medline     #Precautions / PROPHYLAXIS:   - Falls  - ortho: Weight bearing status: WBAT   - Lungs: Aspiration, Incentive Spirometer   - DVT PPX: Eliquis 5 mg BID   ----------------------------------------  11/20 --Labs CBC mild anemia, normal renal function, LFT elevated, downtrending   ----------------------------------------  Liaison with other providers/agencies    PEER TO PEER with Dr Tripp (patient's insurance company)  * Peer to Peer completed, insurance approval retrospectively given to cover from 11/14 to 11/20, Insurance co awaits updated notes to determine further approval of coverage   SW team to send updated clinical and functional notes to medical insurance company  ----------------------------------------  IDT conference on 11/22  Social Work: Approved with insurance til 11/26  SLP: --  OT: Sup for UBD/CGA. Min A for LBD. Total A for all other ADLs/transfers.  PT: Mod A for transfers with SB. 5 steps in parallel bars with Max A of 3. 150ft WC mobility with CGA.   Amb 50 ft with hoyerlift and cardiac walker.   RT: --  DME: Damaris, SB, hip kit, hospital bed, WC.   Barriers: Weakness.   Functional level on DC: Min A with SB transfers.   TDD: 12/6 to home.  ----------------------------------------  OUTPATIENT/FOLLOW UP:    Trae Whitney  Pulmonary Disease  [address], [address] [place] [address]  Phone: [phone]  Fax: [phone]  Follow Up Time: 2 weeks    Ryanne Allen  Rheumatology  [address] [address]  Phone: [phone]  Fax: [phone]  Follow Up Time: 1 week    Kyle Pierce  Internal Medicine  [address] [place] [address], [address]  [place] [address]  Phone: [phone]  Fax: [phone]  Follow Up Time: 2 weeks    Addy Powers  Pulmonary Disease  [address] [facility] [facility] [address]  [place], [place] [address]  Phone: [phone]  Fax: [phone]  Follow Up Time:     Kwame Hawley  Critical Care Medicine  [address] [facility], [address]  [place] [address]  Phone: [phone]  Fax: [phone]  Follow Up Time:     Neena Borrego  Rheumatology  [address] [place] [address], [address]  [place] [address]  Phone: [phone]  Fax: [phone]  Follow Up Time:    [facility]  INT[address]  Scheduled Appointment: 09/13/2023  2:40 PM

## 2023-11-24 NOTE — PROGRESS NOTE ADULT - SUBJECTIVE AND OBJECTIVE BOX
Medicine Progress Note    Patient is a 62y old  Female who presents with a chief complaint of Interstitial Lung Disease (24 Nov 2023 10:04)      SUBJECTIVE / OVERNIGHT EVENTS:  seen and examined  Chart reviewed  No overnight events  Limb weakness improving with therapy  BM+  No pain  No complaints    ADDITIONAL REVIEW OF SYSTEMS:  denied fever/chills/CP/SOB/cough/palpitation/dizziness/abdominal pian/nausea/vomiting/diarrhoea/constipation/dysuria/leg or calf pain/headaches.all other ROS neg    MEDICATIONS  (STANDING):  apixaban 5 milliGRAM(s) Oral every 12 hours  artificial  tears Solution 1 Drop(s) Both EYES every 12 hours  ascorbic acid 500 milliGRAM(s) Oral daily  atovaquone  Suspension 1500 milliGRAM(s) Oral daily  dextrose 5%. 1000 milliLiter(s) (50 mL/Hr) IV Continuous <Continuous>  dextrose 5%. 1000 milliLiter(s) (100 mL/Hr) IV Continuous <Continuous>  dextrose 50% Injectable 25 Gram(s) IV Push once  dextrose 50% Injectable 25 Gram(s) IV Push once  dextrose 50% Injectable 12.5 Gram(s) IV Push once  folic acid 1 milliGRAM(s) Oral daily  gabapentin 300 milliGRAM(s) Oral three times a day  glucagon  Injectable 1 milliGRAM(s) IntraMuscular once  lactobacillus acidophilus 1 Tablet(s) Oral two times a day with meals  melatonin 6 milliGRAM(s) Oral at bedtime  metoprolol tartrate 12.5 milliGRAM(s) Oral two times a day  multivitamin 1 Tablet(s) Oral daily  nystatin Powder 1 Application(s) Topical two times a day  pantoprazole    Tablet 40 milliGRAM(s) Oral before breakfast  sodium chloride 0.9% lock flush 5 milliLiter(s) IV Push two times a day  zinc oxide 40% Paste 1 Application(s) Topical three times a day    MEDICATIONS  (PRN):  albuterol/ipratropium for Nebulization 3 milliLiter(s) Nebulizer every 6 hours PRN Shortness of Breath and/or Wheezing  ALPRAZolam 0.25 milliGRAM(s) Oral every 6 hours PRN Anxiety  aluminum hydroxide/magnesium hydroxide/simethicone Suspension 30 milliLiter(s) Oral every 4 hours PRN Dyspepsia  ammonium lactate 12% Lotion 1 Application(s) Topical every 12 hours PRN BL feet dryness  benzocaine/menthol Lozenge 1 Lozenge Oral two times a day PRN Sore Throat  benzonatate 100 milliGRAM(s) Oral three times a day PRN Cough  bisacodyl 5 milliGRAM(s) Oral <User Schedule> PRN Constipation  dextrose Oral Gel 15 Gram(s) Oral once PRN Blood Glucose LESS THAN 70 milliGRAM(s)/deciliter  hydrocortisone hemorrhoidal Suppository 1 Suppository(s) Rectal two times a day PRN Hemorrhoids  loperamide 2 milliGRAM(s) Oral three times a day PRN Diarrhea  polyethylene glycol 3350 17 Gram(s) Oral daily PRN Constipation  psyllium Powder 1 Packet(s) Oral daily PRN Constipation  SIMETHICONE SOFTGELS 250 milliGRAM(s),SIMETHICONE SOFTGELS 250 milliGRAM(s) 250 milliGRAM(s) Oral three times a day PRN for gas  sodium chloride 0.65% Nasal 1 Spray(s) Both Nostrils every 8 hours PRN Nasal Congestion    CAPILLARY BLOOD GLUCOSE        I&O's Summary      PHYSICAL EXAM:  Vital Signs Last 24 Hrs  T(C): 36.4 (24 Nov 2023 08:30), Max: 36.8 (23 Nov 2023 20:04)  T(F): 97.5 (24 Nov 2023 08:30), Max: 98.3 (23 Nov 2023 20:04)  HR: 82 (24 Nov 2023 08:30) (82 - 86)  BP: 127/74 (24 Nov 2023 08:30) (110/63 - 127/74)  BP(mean): --  RR: 16 (24 Nov 2023 08:30) (16 - 16)  SpO2: 94% (24 Nov 2023 08:30) (92% - 94%)    Parameters below as of 24 Nov 2023 08:30  Patient On (Oxygen Delivery Method): room air      GENERAL: Not in distress. Alert    HEENT: clear conjuctiva, MMM. no pallor or icterus  CARDIOVASCULAR: RRR S1, S2. No murmur/rubs/gallop  LUNGS: BLAE reduced, no rales, no wheezing, no rhonchi.    ABDOMEN: ND. Soft,  NT, no guarding / rebound / rigidity. BS normoactive  BACK: No spine tenderness.  EXTREMITIES: no edema. no leg or calf TP.  SKIN: warm and dry  PSYCHIATRIC: Calm.  No agitation.  CNS: AAO. moves limbs, follows commands    LABS:                        10.1   9.86  )-----------( 273      ( 23 Nov 2023 05:00 )             32.6     11-23    138  |  102  |  21  ----------------------------<  134<H>  3.5   |  28  |  0.53    Ca    9.3      23 Nov 2023 05:00    TPro  5.3<L>  /  Alb  2.7<L>  /  TBili  0.8  /  DBili  x   /  AST  30  /  ALT  74<H>  /  AlkPhos  183<H>  11-23          Urinalysis Basic - ( 23 Nov 2023 05:00 )    Color: x / Appearance: x / SG: x / pH: x  Gluc: 134 mg/dL / Ketone: x  / Bili: x / Urobili: x   Blood: x / Protein: x / Nitrite: x   Leuk Esterase: x / RBC: x / WBC x   Sq Epi: x / Non Sq Epi: x / Bacteria: x        COVID-19 PCR: NotDetec (05 Oct 2023 20:17)  SARS-CoV-2: NotDetec (30 Sep 2023 12:52)  SARS-CoV-2: NotDetec (13 Sep 2023 17:55)  SARS-CoV-2: NotDetec (10 Sep 2023 11:24)  SARS-CoV-2: NotDetec (26 Aug 2023 12:55)      RADIOLOGY & ADDITIONAL TESTS:  Imaging from Last 24 Hours:    Electrocardiogram/QTc Interval:    COORDINATION OF CARE:  Care Discussed with Consultants/Other Providers:

## 2023-11-24 NOTE — PROGRESS NOTE ADULT - SUBJECTIVE AND OBJECTIVE BOX
SUBJECTIVE/ROS:    Patient seen and examined at bedside this AM with Dr. López, partner present  Admits to sleeping well.  Experiencing frequent BMs and gas.  Discussed Ab XR results with moderate stool burden. Encouraged Miralax use PRN.   Discussed results of Peer to Peer interview with insurance yesterday. approved till 11/26    ROS--No chest or abd pain, no palpitations nausea or vomiting  Tingling/numbness of fingers, non progressive  LBM 11/23    Therapy.  Improvement with strength at knee--now antigravity  Observed ambulating with manual WC, self propelling it > 100ft  Sustained improvement of upper extremity function and strength    Vital Signs Last 24 Hrs  T(C): 36.4 (24 Nov 2023 08:30), Max: 36.8 (23 Nov 2023 20:04)  T(F): 97.5 (24 Nov 2023 08:30), Max: 98.3 (23 Nov 2023 20:04)  HR: 82 (24 Nov 2023 08:30) (82 - 86)  BP: 127/74 (24 Nov 2023 08:30) (110/63 - 127/74)  BP(mean): --  RR: 16 (24 Nov 2023 08:30) (16 - 16)  SpO2: 94% (24 Nov 2023 08:30) (92% - 94%)      PHYSICAL EXAM:  Gen -  Comfortable,  at bedside -normal oxy sat and subsequently self ambulating WC  Pulm - clear  Cardiovascular - HS i and II intermittently irregular  Abdomen - Soft, non tender, hypoactive bowel sounds  Extremities - edema to b/l LE, no calf tenderness, LUE Med line    Neuro-  Cognitive - awake, alert, fully oriented, engaging, appropriate,   Light tough sensation diffusely reduced on fingers and dorsal foot, and over right lower jaw, localized area approx 2 cm, no skin changes noted, non progressive   Motor -                    LEFT    UE - 4/5                    RIGHT UE - 4/5                     LEFT    LE - 3+/5, distally and 3/5 proximal                    RIGHT LE - Ankles PF 3/5 ,DF 2/5, Knee ext 3/5 Hips limited by pain Rt hip due to ulcer at ECHO access site   Sensory - decreased light tough sensation fingers and sole of foot, difusely  MSK: improvement with motor strength  Psychiatric - Mood stable, Affect WNL  Skin -- , stage 2 pressure injury to right buttock 4 x1 and left buttock 3x1, stage 2 pressure injury ti right inner thigh 0.2 x 0.2, right groin wound 10.5 x 2.0, Left groin wound 0.6x1.8.0.1cm    MMT--Able to contract B/L upper back muscles, EF/EE 4/5 distally, knee Est 3/5        LABS:                        10.1   9.86  )-----------( 273      ( 23 Nov 2023 05:00 )             32.6     11-23    138  |  102  |  21  ----------------------------<  134<H>  3.5   |  28  |  0.53    Ca    9.3      23 Nov 2023 05:00    TPro  5.3<L>  /  Alb  2.7<L>  /  TBili  0.8  /  DBili  x   /  AST  30  /  ALT  74<H>  /  AlkPhos  183<H>  11-23      Urinalysis Basic - ( 23 Nov 2023 05:00 )    Color: x / Appearance: x / SG: x / pH: x  Gluc: 134 mg/dL / Ketone: x  / Bili: x / Urobili: x   Blood: x / Protein: x / Nitrite: x   Leuk Esterase: x / RBC: x / WBC x   Sq Epi: x / Non Sq Epi: x / Bacteria: x        MEDICATIONS  (STANDING):  apixaban 5 milliGRAM(s) Oral every 12 hours  artificial  tears Solution 1 Drop(s) Both EYES every 12 hours  ascorbic acid 500 milliGRAM(s) Oral daily  atovaquone  Suspension 1500 milliGRAM(s) Oral daily  dextrose 5%. 1000 milliLiter(s) (50 mL/Hr) IV Continuous <Continuous>  dextrose 5%. 1000 milliLiter(s) (100 mL/Hr) IV Continuous <Continuous>  dextrose 50% Injectable 25 Gram(s) IV Push once  dextrose 50% Injectable 25 Gram(s) IV Push once  dextrose 50% Injectable 12.5 Gram(s) IV Push once  folic acid 1 milliGRAM(s) Oral daily  gabapentin 300 milliGRAM(s) Oral three times a day  glucagon  Injectable 1 milliGRAM(s) IntraMuscular once  lactobacillus acidophilus 1 Tablet(s) Oral two times a day with meals  melatonin 6 milliGRAM(s) Oral at bedtime  metoprolol tartrate 12.5 milliGRAM(s) Oral two times a day  multivitamin 1 Tablet(s) Oral daily  nystatin Powder 1 Application(s) Topical two times a day  pantoprazole    Tablet 40 milliGRAM(s) Oral before breakfast  sodium chloride 0.9% lock flush 5 milliLiter(s) IV Push two times a day  zinc oxide 40% Paste 1 Application(s) Topical three times a day    MEDICATIONS  (PRN):  albuterol/ipratropium for Nebulization 3 milliLiter(s) Nebulizer every 6 hours PRN Shortness of Breath and/or Wheezing  ALPRAZolam 0.25 milliGRAM(s) Oral every 6 hours PRN Anxiety  aluminum hydroxide/magnesium hydroxide/simethicone Suspension 30 milliLiter(s) Oral every 4 hours PRN Dyspepsia  ammonium lactate 12% Lotion 1 Application(s) Topical every 12 hours PRN BL feet dryness  benzocaine/menthol Lozenge 1 Lozenge Oral two times a day PRN Sore Throat  benzonatate 100 milliGRAM(s) Oral three times a day PRN Cough  bisacodyl 5 milliGRAM(s) Oral <User Schedule> PRN Constipation  dextrose Oral Gel 15 Gram(s) Oral once PRN Blood Glucose LESS THAN 70 milliGRAM(s)/deciliter  hydrocortisone hemorrhoidal Suppository 1 Suppository(s) Rectal two times a day PRN Hemorrhoids  loperamide 2 milliGRAM(s) Oral three times a day PRN Diarrhea  polyethylene glycol 3350 17 Gram(s) Oral daily PRN Constipation  psyllium Powder 1 Packet(s) Oral daily PRN Constipation  SIMETHICONE SOFTGELS 250 milliGRAM(s),SIMETHICONE SOFTGELS 250 milliGRAM(s) 250 milliGRAM(s) Oral three times a day PRN for gas  sodium chloride 0.65% Nasal 1 Spray(s) Both Nostrils every 8 hours PRN Nasal Congestion      SUBJECTIVE/ROS:  Patient seen and examined at bedside this AM with Dr. López, partner present  Admits to sleeping well.  Experiencing frequent BMs and gas.  Discussed Ab XR results with moderate stool burden. Encouraged Miralax use PRN.   Discussed results of Peer to Peer interview with insurance yesterday. approved till 11/26    ROS--No chest or abd pain, no palpitations nausea or vomiting  Tingling/numbness of fingers, non progressive  LBM 11/23    Therapy.  Improvement with strength at knee--now antigravity  Observed ambulating with manual WC, self propelling it > 150ft  Sustained improvement of upper extremity function and strength    Vital Signs Last 24 Hrs  T(C): 36.4 (24 Nov 2023 08:30), Max: 36.8 (23 Nov 2023 20:04)  T(F): 97.5 (24 Nov 2023 08:30), Max: 98.3 (23 Nov 2023 20:04)  HR: 82 (24 Nov 2023 08:30) (82 - 86)  BP: 127/74 (24 Nov 2023 08:30) (110/63 - 127/74)  RR: 16 (24 Nov 2023 08:30) (16 - 16)  SpO2: 94% (24 Nov 2023 08:30) (92% - 94%)      PHYSICAL EXAM:  Gen -  Comfortable,  at bedside -normal oxy sat and subsequently self ambulating WC  Pulm - clear  Cardiovascular - HS i and II intermittently irregular  Abdomen - Soft, non tender,   Extremities - edema to b/l LE, no calf tenderness, LUE Med line    Neuro-  Cognitive - awake, alert, fully oriented, engaging, appropriate,   Light tough sensation diffusely reduced on fingers and dorsal foot, and over right lower jaw, localized area approx 2 cm, no skin changes noted, non progressive   Motor -                    LEFT    UE - 4/5                    RIGHT UE - 4/5                     LEFT    LE - 3+/5, distally and 3/5 proximal                    RIGHT LE - Ankles PF 3/5 ,DF 2/5, Knee ext 3/5 Hips limited by pain Rt hip due to ulcer at ECHO access site   Sensory - decreased light tough sensation fingers and sole of foot, difusely  MSK: improvement with motor strength  Psychiatric - Mood stable, Affect WNL  Skin -- , stage 2 pressure injury to right buttock 4 x1 and left buttock 3x1, stage 2 pressure injury ti right inner thigh 0.2 x 0.2, right groin wound 10.5 x 2.0, Left groin wound 0.6x1.8.0.1cm    MMT--Able to contract B/L upper back muscles, EF/EE 4/5 distally, knee Est 3/5        LABS:                        10.1   9.86  )-----------( 273      ( 23 Nov 2023 05:00 )             32.6     11-23    138  |  102  |  21  ----------------------------<  134<H>  3.5   |  28  |  0.53    Ca    9.3      23 Nov 2023 05:00    TPro  5.3<L>  /  Alb  2.7<L>  /  TBili  0.8  /  DBili  x   /  AST  30  /  ALT  74<H>  /  AlkPhos  183<H>  11-23      Urinalysis Basic - ( 23 Nov 2023 05:00 )    Color: x / Appearance: x / SG: x / pH: x  Gluc: 134 mg/dL / Ketone: x  / Bili: x / Urobili: x   Blood: x / Protein: x / Nitrite: x   Leuk Esterase: x / RBC: x / WBC x   Sq Epi: x / Non Sq Epi: x / Bacteria: x        MEDICATIONS  (STANDING):  apixaban 5 milliGRAM(s) Oral every 12 hours  artificial  tears Solution 1 Drop(s) Both EYES every 12 hours  ascorbic acid 500 milliGRAM(s) Oral daily  atovaquone  Suspension 1500 milliGRAM(s) Oral daily  dextrose 5%. 1000 milliLiter(s) (50 mL/Hr) IV Continuous <Continuous>  dextrose 5%. 1000 milliLiter(s) (100 mL/Hr) IV Continuous <Continuous>  dextrose 50% Injectable 25 Gram(s) IV Push once  dextrose 50% Injectable 25 Gram(s) IV Push once  dextrose 50% Injectable 12.5 Gram(s) IV Push once  folic acid 1 milliGRAM(s) Oral daily  gabapentin 300 milliGRAM(s) Oral three times a day  glucagon  Injectable 1 milliGRAM(s) IntraMuscular once  lactobacillus acidophilus 1 Tablet(s) Oral two times a day with meals  melatonin 6 milliGRAM(s) Oral at bedtime  metoprolol tartrate 12.5 milliGRAM(s) Oral two times a day  multivitamin 1 Tablet(s) Oral daily  nystatin Powder 1 Application(s) Topical two times a day  pantoprazole    Tablet 40 milliGRAM(s) Oral before breakfast  sodium chloride 0.9% lock flush 5 milliLiter(s) IV Push two times a day  zinc oxide 40% Paste 1 Application(s) Topical three times a day    MEDICATIONS  (PRN):  albuterol/ipratropium for Nebulization 3 milliLiter(s) Nebulizer every 6 hours PRN Shortness of Breath and/or Wheezing  ALPRAZolam 0.25 milliGRAM(s) Oral every 6 hours PRN Anxiety  aluminum hydroxide/magnesium hydroxide/simethicone Suspension 30 milliLiter(s) Oral every 4 hours PRN Dyspepsia  ammonium lactate 12% Lotion 1 Application(s) Topical every 12 hours PRN BL feet dryness  benzocaine/menthol Lozenge 1 Lozenge Oral two times a day PRN Sore Throat  benzonatate 100 milliGRAM(s) Oral three times a day PRN Cough  bisacodyl 5 milliGRAM(s) Oral <User Schedule> PRN Constipation  dextrose Oral Gel 15 Gram(s) Oral once PRN Blood Glucose LESS THAN 70 milliGRAM(s)/deciliter  hydrocortisone hemorrhoidal Suppository 1 Suppository(s) Rectal two times a day PRN Hemorrhoids  loperamide 2 milliGRAM(s) Oral three times a day PRN Diarrhea  polyethylene glycol 3350 17 Gram(s) Oral daily PRN Constipation  psyllium Powder 1 Packet(s) Oral daily PRN Constipation  SIMETHICONE SOFTGELS 250 milliGRAM(s),SIMETHICONE SOFTGELS 250 milliGRAM(s) 250 milliGRAM(s) Oral three times a day PRN for gas  sodium chloride 0.65% Nasal 1 Spray(s) Both Nostrils every 8 hours PRN Nasal Congestion      SUBJECTIVE/ROS:  Patient seen and examined at bedside this AM with Dr. López, partner present  Admits to sleeping well.  Experiencing frequent BMs and gas.  Discussed Ab XR results with moderate stool burden. Encouraged Miralax use PRN.   Discussed results of Peer to Peer interview with insurance yesterday. approved till 11/26    ROS--No chest or abd pain, no palpitations nausea or vomiting  Tingling/numbness of fingers, non progressive  LBM 11/23    Therapy.  Improvement with strength at knee--now antigravity  Observed ambulating with manual WC, self propelling it > 150ft  Marked improvement engaged actively and achieved slide board transfer bed to   Sustained improvement of upper extremity function and strength    Vital Signs Last 24 Hrs  T(C): 36.4 (24 Nov 2023 08:30), Max: 36.8 (23 Nov 2023 20:04)  T(F): 97.5 (24 Nov 2023 08:30), Max: 98.3 (23 Nov 2023 20:04)  HR: 82 (24 Nov 2023 08:30) (82 - 86)  BP: 127/74 (24 Nov 2023 08:30) (110/63 - 127/74)  RR: 16 (24 Nov 2023 08:30) (16 - 16)  SpO2: 94% (24 Nov 2023 08:30) (92% - 94%)      PHYSICAL EXAM:  Gen -  Comfortable,  at bedside -normal oxy sat and subsequently self ambulating   Pulm - clear  Cardiovascular - HS i and II intermittently irregular  Abdomen - Soft, non tender,   Extremities - edema to b/l LE, no calf tenderness, LUE Med line    Neuro-  Cognitive - awake, alert, fully oriented, engaging, appropriate,   Light tough sensation diffusely reduced on fingers and dorsal foot, and over right lower jaw, localized area approx 2 cm, no skin changes noted, non progressive   Motor -                    LEFT    UE - 4/5                    RIGHT UE - 4/5                     LEFT    LE - 3+/5, distally and 3/5 proximal                    RIGHT LE - Ankles PF 3/5 ,DF 2/5, Knee ext 3/5 Hips limited by pain Rt hip due to ulcer at ECHO access site   Sensory - decreased light tough sensation fingers and sole of foot, difusely  MSK: improvement with motor strength  Psychiatric - Mood stable, Affect WNL  Skin -- , stage 2 pressure injury to right buttock 4 x1 and left buttock 3x1, stage 2 pressure injury ti right inner thigh 0.2 x 0.2, right groin wound 10.5 x 2.0, Left groin wound 0.6x1.8.0.1cm    MMT--Able to contract B/L upper back muscles, EF/EE 4/5 distally, knee Est 3/5        LABS:                        10.1   9.86  )-----------( 273      ( 23 Nov 2023 05:00 )             32.6     11-23    138  |  102  |  21  ----------------------------<  134<H>  3.5   |  28  |  0.53    Ca    9.3      23 Nov 2023 05:00    TPro  5.3<L>  /  Alb  2.7<L>  /  TBili  0.8  /  DBili  x   /  AST  30  /  ALT  74<H>  /  AlkPhos  183<H>  11-23      Urinalysis Basic - ( 23 Nov 2023 05:00 )    Color: x / Appearance: x / SG: x / pH: x  Gluc: 134 mg/dL / Ketone: x  / Bili: x / Urobili: x   Blood: x / Protein: x / Nitrite: x   Leuk Esterase: x / RBC: x / WBC x   Sq Epi: x / Non Sq Epi: x / Bacteria: x        MEDICATIONS  (STANDING):  apixaban 5 milliGRAM(s) Oral every 12 hours  artificial  tears Solution 1 Drop(s) Both EYES every 12 hours  ascorbic acid 500 milliGRAM(s) Oral daily  atovaquone  Suspension 1500 milliGRAM(s) Oral daily  dextrose 5%. 1000 milliLiter(s) (50 mL/Hr) IV Continuous <Continuous>  dextrose 5%. 1000 milliLiter(s) (100 mL/Hr) IV Continuous <Continuous>  dextrose 50% Injectable 25 Gram(s) IV Push once  dextrose 50% Injectable 25 Gram(s) IV Push once  dextrose 50% Injectable 12.5 Gram(s) IV Push once  folic acid 1 milliGRAM(s) Oral daily  gabapentin 300 milliGRAM(s) Oral three times a day  glucagon  Injectable 1 milliGRAM(s) IntraMuscular once  lactobacillus acidophilus 1 Tablet(s) Oral two times a day with meals  melatonin 6 milliGRAM(s) Oral at bedtime  metoprolol tartrate 12.5 milliGRAM(s) Oral two times a day  multivitamin 1 Tablet(s) Oral daily  nystatin Powder 1 Application(s) Topical two times a day  pantoprazole    Tablet 40 milliGRAM(s) Oral before breakfast  sodium chloride 0.9% lock flush 5 milliLiter(s) IV Push two times a day  zinc oxide 40% Paste 1 Application(s) Topical three times a day    MEDICATIONS  (PRN):  albuterol/ipratropium for Nebulization 3 milliLiter(s) Nebulizer every 6 hours PRN Shortness of Breath and/or Wheezing  ALPRAZolam 0.25 milliGRAM(s) Oral every 6 hours PRN Anxiety  aluminum hydroxide/magnesium hydroxide/simethicone Suspension 30 milliLiter(s) Oral every 4 hours PRN Dyspepsia  ammonium lactate 12% Lotion 1 Application(s) Topical every 12 hours PRN BL feet dryness  benzocaine/menthol Lozenge 1 Lozenge Oral two times a day PRN Sore Throat  benzonatate 100 milliGRAM(s) Oral three times a day PRN Cough  bisacodyl 5 milliGRAM(s) Oral <User Schedule> PRN Constipation  dextrose Oral Gel 15 Gram(s) Oral once PRN Blood Glucose LESS THAN 70 milliGRAM(s)/deciliter  hydrocortisone hemorrhoidal Suppository 1 Suppository(s) Rectal two times a day PRN Hemorrhoids  loperamide 2 milliGRAM(s) Oral three times a day PRN Diarrhea  polyethylene glycol 3350 17 Gram(s) Oral daily PRN Constipation  psyllium Powder 1 Packet(s) Oral daily PRN Constipation  SIMETHICONE SOFTGELS 250 milliGRAM(s),SIMETHICONE SOFTGELS 250 milliGRAM(s) 250 milliGRAM(s) Oral three times a day PRN for gas  sodium chloride 0.65% Nasal 1 Spray(s) Both Nostrils every 8 hours PRN Nasal Congestion

## 2023-11-25 PROCEDURE — 99232 SBSQ HOSP IP/OBS MODERATE 35: CPT

## 2023-11-25 RX ADMIN — PANTOPRAZOLE SODIUM 40 MILLIGRAM(S): 20 TABLET, DELAYED RELEASE ORAL at 08:35

## 2023-11-25 RX ADMIN — Medication 12.5 MILLIGRAM(S): at 08:35

## 2023-11-25 RX ADMIN — Medication 36 MILLIGRAM(S): at 08:36

## 2023-11-25 RX ADMIN — Medication 500 MILLIGRAM(S): at 20:25

## 2023-11-25 RX ADMIN — Medication 5 MILLIGRAM(S): at 16:44

## 2023-11-25 RX ADMIN — NYSTATIN CREAM 1 APPLICATION(S): 100000 CREAM TOPICAL at 20:26

## 2023-11-25 RX ADMIN — Medication 1 PACKET(S): at 14:23

## 2023-11-25 RX ADMIN — SODIUM CHLORIDE 5 MILLILITER(S): 9 INJECTION INTRAMUSCULAR; INTRAVENOUS; SUBCUTANEOUS at 12:23

## 2023-11-25 RX ADMIN — Medication 1 MILLIGRAM(S): at 20:25

## 2023-11-25 RX ADMIN — GABAPENTIN 300 MILLIGRAM(S): 400 CAPSULE ORAL at 14:11

## 2023-11-25 RX ADMIN — Medication 1 TABLET(S): at 20:25

## 2023-11-25 RX ADMIN — NYSTATIN CREAM 1 APPLICATION(S): 100000 CREAM TOPICAL at 12:23

## 2023-11-25 RX ADMIN — GABAPENTIN 300 MILLIGRAM(S): 400 CAPSULE ORAL at 20:24

## 2023-11-25 RX ADMIN — ZINC OXIDE 1 APPLICATION(S): 200 OINTMENT TOPICAL at 20:24

## 2023-11-25 RX ADMIN — Medication 1 TABLET(S): at 08:35

## 2023-11-25 RX ADMIN — APIXABAN 5 MILLIGRAM(S): 2.5 TABLET, FILM COATED ORAL at 20:25

## 2023-11-25 RX ADMIN — Medication 1 TABLET(S): at 20:36

## 2023-11-25 RX ADMIN — APIXABAN 5 MILLIGRAM(S): 2.5 TABLET, FILM COATED ORAL at 08:35

## 2023-11-25 RX ADMIN — Medication 6 MILLIGRAM(S): at 20:25

## 2023-11-25 RX ADMIN — Medication 1 DROP(S): at 20:24

## 2023-11-25 RX ADMIN — ZINC OXIDE 1 APPLICATION(S): 200 OINTMENT TOPICAL at 12:23

## 2023-11-25 RX ADMIN — Medication 12.5 MILLIGRAM(S): at 20:25

## 2023-11-25 RX ADMIN — GABAPENTIN 300 MILLIGRAM(S): 400 CAPSULE ORAL at 08:35

## 2023-11-25 RX ADMIN — SODIUM CHLORIDE 5 MILLILITER(S): 9 INJECTION INTRAMUSCULAR; INTRAVENOUS; SUBCUTANEOUS at 20:36

## 2023-11-25 RX ADMIN — ATOVAQUONE 1500 MILLIGRAM(S): 750 SUSPENSION ORAL at 20:24

## 2023-11-25 NOTE — PROGRESS NOTE ADULT - SUBJECTIVE AND OBJECTIVE BOX
Cc: Impaired mobility     HPI: Patient with no new medical issues today.  Pain controlled, no chest pain, no N/V, no Fevers/Chills. No other new ROS  Has been tolerating rehabilitation program.    albuterol/ipratropium for Nebulization 3 milliLiter(s) Nebulizer every 6 hours PRN  ALPRAZolam 0.25 milliGRAM(s) Oral every 6 hours PRN  aluminum hydroxide/magnesium hydroxide/simethicone Suspension 30 milliLiter(s) Oral every 4 hours PRN  ammonium lactate 12% Lotion 1 Application(s) Topical every 12 hours PRN  apixaban 5 milliGRAM(s) Oral every 12 hours  artificial  tears Solution 1 Drop(s) Both EYES every 12 hours  ascorbic acid 500 milliGRAM(s) Oral daily  atovaquone  Suspension 1500 milliGRAM(s) Oral daily  benzocaine/menthol Lozenge 1 Lozenge Oral two times a day PRN  benzonatate 100 milliGRAM(s) Oral three times a day PRN  bisacodyl 5 milliGRAM(s) Oral <User Schedule> PRN  dextrose 5%. 1000 milliLiter(s) IV Continuous <Continuous>  dextrose 5%. 1000 milliLiter(s) IV Continuous <Continuous>  dextrose 50% Injectable 25 Gram(s) IV Push once  dextrose 50% Injectable 25 Gram(s) IV Push once  dextrose 50% Injectable 12.5 Gram(s) IV Push once  dextrose Oral Gel 15 Gram(s) Oral once PRN  folic acid 1 milliGRAM(s) Oral daily  gabapentin 300 milliGRAM(s) Oral three times a day  glucagon  Injectable 1 milliGRAM(s) IntraMuscular once  hydrocortisone hemorrhoidal Suppository 1 Suppository(s) Rectal two times a day PRN  lactobacillus acidophilus 1 Tablet(s) Oral two times a day with meals  loperamide 2 milliGRAM(s) Oral three times a day PRN  melatonin 6 milliGRAM(s) Oral at bedtime  methylPREDNISolone 36 milliGRAM(s) Oral daily  metoprolol tartrate 12.5 milliGRAM(s) Oral two times a day  multivitamin 1 Tablet(s) Oral daily  nystatin Powder 1 Application(s) Topical two times a day  pantoprazole    Tablet 40 milliGRAM(s) Oral before breakfast  polyethylene glycol 3350 17 Gram(s) Oral daily PRN  psyllium Powder 1 Packet(s) Oral daily PRN  SIMETHICONE SOFTGELS 250 milliGRAM(s),SIMETHICONE SOFTGELS 250 milliGRAM(s) 250 milliGRAM(s) Oral three times a day PRN  sodium chloride 0.65% Nasal 1 Spray(s) Both Nostrils every 8 hours PRN  sodium chloride 0.9% lock flush 5 milliLiter(s) IV Push two times a day  zinc oxide 40% Paste 1 Application(s) Topical three times a day      T(C): 37.1 (11-24-23 @ 21:59), Max: 37.1 (11-24-23 @ 21:59)  HR: 84 (11-24-23 @ 21:59) (84 - 84)  BP: 123/73 (11-24-23 @ 21:59) (123/73 - 123/73)  RR: 16 (11-24-23 @ 21:59) (16 - 16)  SpO2: 92% (11-24-23 @ 21:59) (92% - 92%)    In NAD  HEENT- EOMI  Heart- No cyanosis   Lungs- No respiratory distress   Abd- + BS, NT  Ext- No calf pain  Neuro- Exam unchanged      Imp: Patient with diagnosis of Interstitial Lung Disease and Mixed connective tissue disease admitted for comprehensive acute rehabilitation.    Plan:  -A-fib - metoprolol  -Neuropathy - gabapentin   -Impaired mobility - Continue PT/OT  - DVT prophylaxis - apixaban   -Constipation -  miralax   - Skin- Turn q2h, check skin daily  - Continue current medications; patient medically stable.   - Patient is stable to continue current rehabilitation program.

## 2023-11-25 NOTE — PROGRESS NOTE ADULT - SUBJECTIVE AND OBJECTIVE BOX
Medicine Progress Note    Patient is a 62y old  Female who presents with a chief complaint of Interstitial Lung Disease (25 Nov 2023 09:20)      SUBJECTIVE / OVERNIGHT EVENTS:  seen and examined  Chart reviewed  No overnight events  Limb weakness improving with therapy  BM+  No pain  No complaints    ADDITIONAL REVIEW OF SYSTEMS:  denied fever/chills/CP/SOB/cough/palpitation/dizziness/abdominal pian/nausea/vomiting/diarrhoea/constipation/dysuria/leg or calf pain/headaches.all other ROS neg    MEDICATIONS  (STANDING):  apixaban 5 milliGRAM(s) Oral every 12 hours  artificial  tears Solution 1 Drop(s) Both EYES every 12 hours  ascorbic acid 500 milliGRAM(s) Oral daily  atovaquone  Suspension 1500 milliGRAM(s) Oral daily  dextrose 5%. 1000 milliLiter(s) (50 mL/Hr) IV Continuous <Continuous>  dextrose 5%. 1000 milliLiter(s) (100 mL/Hr) IV Continuous <Continuous>  dextrose 50% Injectable 25 Gram(s) IV Push once  dextrose 50% Injectable 25 Gram(s) IV Push once  dextrose 50% Injectable 12.5 Gram(s) IV Push once  folic acid 1 milliGRAM(s) Oral daily  gabapentin 300 milliGRAM(s) Oral three times a day  glucagon  Injectable 1 milliGRAM(s) IntraMuscular once  lactobacillus acidophilus 1 Tablet(s) Oral two times a day with meals  melatonin 6 milliGRAM(s) Oral at bedtime  methylPREDNISolone 36 milliGRAM(s) Oral daily  metoprolol tartrate 12.5 milliGRAM(s) Oral two times a day  multivitamin 1 Tablet(s) Oral daily  nystatin Powder 1 Application(s) Topical two times a day  pantoprazole    Tablet 40 milliGRAM(s) Oral before breakfast  sodium chloride 0.9% lock flush 5 milliLiter(s) IV Push two times a day  zinc oxide 40% Paste 1 Application(s) Topical three times a day    MEDICATIONS  (PRN):  albuterol/ipratropium for Nebulization 3 milliLiter(s) Nebulizer every 6 hours PRN Shortness of Breath and/or Wheezing  ALPRAZolam 0.25 milliGRAM(s) Oral every 6 hours PRN Anxiety  aluminum hydroxide/magnesium hydroxide/simethicone Suspension 30 milliLiter(s) Oral every 4 hours PRN Dyspepsia  ammonium lactate 12% Lotion 1 Application(s) Topical every 12 hours PRN BL feet dryness  benzocaine/menthol Lozenge 1 Lozenge Oral two times a day PRN Sore Throat  benzonatate 100 milliGRAM(s) Oral three times a day PRN Cough  bisacodyl 5 milliGRAM(s) Oral <User Schedule> PRN Constipation  dextrose Oral Gel 15 Gram(s) Oral once PRN Blood Glucose LESS THAN 70 milliGRAM(s)/deciliter  hydrocortisone hemorrhoidal Suppository 1 Suppository(s) Rectal two times a day PRN Hemorrhoids  loperamide 2 milliGRAM(s) Oral three times a day PRN Diarrhea  polyethylene glycol 3350 17 Gram(s) Oral daily PRN Constipation  psyllium Powder 1 Packet(s) Oral daily PRN Constipation  SIMETHICONE SOFTGELS 250 milliGRAM(s),SIMETHICONE SOFTGELS 250 milliGRAM(s) 250 milliGRAM(s) Oral three times a day PRN for gas  sodium chloride 0.65% Nasal 1 Spray(s) Both Nostrils every 8 hours PRN Nasal Congestion    CAPILLARY BLOOD GLUCOSE        I&O's Summary      PHYSICAL EXAM:  Vital Signs Last 24 Hrs  T(C): 36.8 (25 Nov 2023 08:30), Max: 37.1 (24 Nov 2023 21:59)  T(F): 98.2 (25 Nov 2023 08:30), Max: 98.7 (24 Nov 2023 21:59)  HR: 80 (25 Nov 2023 08:30) (80 - 84)  BP: 113/68 (25 Nov 2023 08:30) (113/68 - 123/73)  BP(mean): --  RR: 16 (25 Nov 2023 08:30) (16 - 16)  SpO2: 94% (25 Nov 2023 08:30) (92% - 94%)    Parameters below as of 25 Nov 2023 08:30  Patient On (Oxygen Delivery Method): room air    GENERAL: Not in distress. Alert    HEENT: clear conjuctiva, MMM. no pallor or icterus  CARDIOVASCULAR: RRR S1, S2. No murmur/rubs/gallop  LUNGS: BLAE reduced, no rales, no wheezing, no rhonchi.    ABDOMEN: ND. Soft,  NT, no guarding / rebound / rigidity. BS normoactive  BACK: No spine tenderness.  EXTREMITIES: no edema. no leg or calf TP.  SKIN: warm and dry  PSYCHIATRIC: Calm.  No agitation.  CNS: AAO. moves limbs, follows commands          LABS:                    COVID-19 PCR: NotDetec (05 Oct 2023 20:17)  SARS-CoV-2: NotDetec (30 Sep 2023 12:52)  SARS-CoV-2: NotDetec (13 Sep 2023 17:55)  SARS-CoV-2: NotDetec (10 Sep 2023 11:24)  SARS-CoV-2: NotDetec (26 Aug 2023 12:55)      RADIOLOGY & ADDITIONAL TESTS:  Imaging from Last 24 Hours:    Electrocardiogram/QTc Interval:    COORDINATION OF CARE:  Care Discussed with Consultants/Other Providers:

## 2023-11-26 PROCEDURE — 99232 SBSQ HOSP IP/OBS MODERATE 35: CPT

## 2023-11-26 RX ADMIN — APIXABAN 5 MILLIGRAM(S): 2.5 TABLET, FILM COATED ORAL at 21:03

## 2023-11-26 RX ADMIN — ZINC OXIDE 1 APPLICATION(S): 200 OINTMENT TOPICAL at 08:53

## 2023-11-26 RX ADMIN — Medication 1 MILLIGRAM(S): at 21:03

## 2023-11-26 RX ADMIN — SODIUM CHLORIDE 5 MILLILITER(S): 9 INJECTION INTRAMUSCULAR; INTRAVENOUS; SUBCUTANEOUS at 21:15

## 2023-11-26 RX ADMIN — Medication 36 MILLIGRAM(S): at 08:54

## 2023-11-26 RX ADMIN — Medication 12.5 MILLIGRAM(S): at 21:04

## 2023-11-26 RX ADMIN — GABAPENTIN 300 MILLIGRAM(S): 400 CAPSULE ORAL at 08:55

## 2023-11-26 RX ADMIN — Medication 500 MILLIGRAM(S): at 21:03

## 2023-11-26 RX ADMIN — SODIUM CHLORIDE 5 MILLILITER(S): 9 INJECTION INTRAMUSCULAR; INTRAVENOUS; SUBCUTANEOUS at 08:55

## 2023-11-26 RX ADMIN — NYSTATIN CREAM 1 APPLICATION(S): 100000 CREAM TOPICAL at 21:04

## 2023-11-26 RX ADMIN — Medication 1 DROP(S): at 08:54

## 2023-11-26 RX ADMIN — GABAPENTIN 300 MILLIGRAM(S): 400 CAPSULE ORAL at 15:50

## 2023-11-26 RX ADMIN — Medication 1 TABLET(S): at 21:03

## 2023-11-26 RX ADMIN — Medication 10 MILLIGRAM(S): at 16:24

## 2023-11-26 RX ADMIN — ATOVAQUONE 1500 MILLIGRAM(S): 750 SUSPENSION ORAL at 21:04

## 2023-11-26 RX ADMIN — Medication 1 TABLET(S): at 08:55

## 2023-11-26 RX ADMIN — APIXABAN 5 MILLIGRAM(S): 2.5 TABLET, FILM COATED ORAL at 08:54

## 2023-11-26 RX ADMIN — Medication 6 MILLIGRAM(S): at 21:03

## 2023-11-26 RX ADMIN — ZINC OXIDE 1 APPLICATION(S): 200 OINTMENT TOPICAL at 21:05

## 2023-11-26 RX ADMIN — Medication 1 DROP(S): at 21:04

## 2023-11-26 RX ADMIN — GABAPENTIN 300 MILLIGRAM(S): 400 CAPSULE ORAL at 21:03

## 2023-11-26 RX ADMIN — ZINC OXIDE 1 APPLICATION(S): 200 OINTMENT TOPICAL at 13:09

## 2023-11-26 RX ADMIN — NYSTATIN CREAM 1 APPLICATION(S): 100000 CREAM TOPICAL at 08:54

## 2023-11-26 RX ADMIN — Medication 12.5 MILLIGRAM(S): at 08:55

## 2023-11-26 RX ADMIN — PANTOPRAZOLE SODIUM 40 MILLIGRAM(S): 20 TABLET, DELAYED RELEASE ORAL at 08:55

## 2023-11-26 NOTE — PROGRESS NOTE ADULT - SUBJECTIVE AND OBJECTIVE BOX
Medicine Progress Note    Patient is a 62y old  Female who presents with a chief complaint of Interstitial Lung Disease (26 Nov 2023 09:14)      SUBJECTIVE / OVERNIGHT EVENTS:  seen and examined in am  Chart reviewed  No overnight events  Limb weakness improving with therapy  BM+ but small BM yesterday  No pain  No complaints    ADDITIONAL REVIEW OF SYSTEMS:  denied fever/chills/CP/SOB/cough/palpitation/dizziness/abdominal pian/nausea/vomiting/diarrhoea/constipation/dysuria/leg or calf pain/headaches.all other ROS neg    MEDICATIONS  (STANDING):  apixaban 5 milliGRAM(s) Oral every 12 hours  artificial  tears Solution 1 Drop(s) Both EYES every 12 hours  ascorbic acid 500 milliGRAM(s) Oral daily  atovaquone  Suspension 1500 milliGRAM(s) Oral daily  dextrose 5%. 1000 milliLiter(s) (50 mL/Hr) IV Continuous <Continuous>  dextrose 5%. 1000 milliLiter(s) (100 mL/Hr) IV Continuous <Continuous>  dextrose 50% Injectable 12.5 Gram(s) IV Push once  dextrose 50% Injectable 25 Gram(s) IV Push once  dextrose 50% Injectable 25 Gram(s) IV Push once  folic acid 1 milliGRAM(s) Oral daily  gabapentin 300 milliGRAM(s) Oral three times a day  glucagon  Injectable 1 milliGRAM(s) IntraMuscular once  lactobacillus acidophilus 1 Tablet(s) Oral two times a day with meals  melatonin 6 milliGRAM(s) Oral at bedtime  methylPREDNISolone 36 milliGRAM(s) Oral daily  metoprolol tartrate 12.5 milliGRAM(s) Oral two times a day  multivitamin 1 Tablet(s) Oral daily  nystatin Powder 1 Application(s) Topical two times a day  pantoprazole    Tablet 40 milliGRAM(s) Oral before breakfast  sodium chloride 0.9% lock flush 5 milliLiter(s) IV Push two times a day  zinc oxide 40% Paste 1 Application(s) Topical three times a day    MEDICATIONS  (PRN):  albuterol/ipratropium for Nebulization 3 milliLiter(s) Nebulizer every 6 hours PRN Shortness of Breath and/or Wheezing  ALPRAZolam 0.25 milliGRAM(s) Oral every 6 hours PRN Anxiety  aluminum hydroxide/magnesium hydroxide/simethicone Suspension 30 milliLiter(s) Oral every 4 hours PRN Dyspepsia  ammonium lactate 12% Lotion 1 Application(s) Topical every 12 hours PRN BL feet dryness  benzocaine/menthol Lozenge 1 Lozenge Oral two times a day PRN Sore Throat  benzonatate 100 milliGRAM(s) Oral three times a day PRN Cough  bisacodyl 5 milliGRAM(s) Oral <User Schedule> PRN Constipation  dextrose Oral Gel 15 Gram(s) Oral once PRN Blood Glucose LESS THAN 70 milliGRAM(s)/deciliter  hydrocortisone hemorrhoidal Suppository 1 Suppository(s) Rectal two times a day PRN Hemorrhoids  loperamide 2 milliGRAM(s) Oral three times a day PRN Diarrhea  polyethylene glycol 3350 17 Gram(s) Oral daily PRN Constipation  psyllium Powder 1 Packet(s) Oral daily PRN Constipation  SIMETHICONE SOFTGELS 250 milliGRAM(s),SIMETHICONE SOFTGELS 250 milliGRAM(s) 250 milliGRAM(s) Oral three times a day PRN for gas  sodium chloride 0.65% Nasal 1 Spray(s) Both Nostrils every 8 hours PRN Nasal Congestion    CAPILLARY BLOOD GLUCOSE        I&O's Summary      PHYSICAL EXAM:  Vital Signs Last 24 Hrs  T(C): 36.4 (26 Nov 2023 08:50), Max: 36.8 (25 Nov 2023 20:51)  T(F): 97.6 (26 Nov 2023 08:50), Max: 98.2 (25 Nov 2023 20:51)  HR: 80 (26 Nov 2023 08:50) (79 - 80)  BP: 102/63 (26 Nov 2023 08:50) (102/63 - 152/78)  BP(mean): --  RR: 16 (26 Nov 2023 08:50) (16 - 16)  SpO2: 92% (26 Nov 2023 08:50) (92% - 93%)    Parameters below as of 26 Nov 2023 08:50  Patient On (Oxygen Delivery Method): room air      GENERAL: Not in distress. Alert    HEENT: clear conjuctiva, MMM. no pallor or icterus  CARDIOVASCULAR: RRR S1, S2. No murmur/rubs/gallop  LUNGS: BLAE reduced, no rales, no wheezing, no rhonchi.    ABDOMEN: ND. Soft,  NT, no guarding / rebound / rigidity. BS normoactive  BACK: No spine tenderness.  EXTREMITIES: no edema. no leg or calf TP.  SKIN: warm and dry  PSYCHIATRIC: Calm.  No agitation.  CNS: AAO. moves limbs, follows commands    LABS:                    COVID-19 PCR: NotDetec (05 Oct 2023 20:17)  SARS-CoV-2: NotDetec (30 Sep 2023 12:52)  SARS-CoV-2: NotDetec (13 Sep 2023 17:55)  SARS-CoV-2: NotDetec (10 Sep 2023 11:24)  SARS-CoV-2: NotDetec (26 Aug 2023 12:55)      RADIOLOGY & ADDITIONAL TESTS:  Imaging from Last 24 Hours:    Electrocardiogram/QTc Interval:    COORDINATION OF CARE:  Care Discussed with Consultants/Other Providers:

## 2023-11-26 NOTE — PROGRESS NOTE ADULT - SUBJECTIVE AND OBJECTIVE BOX
Cc: Gait dysfunction    HPI: Patient with no new medical issues today.  Reports small bowel movement yesterday.  Thinks strength is improving.   Has been tolerating rehabilitation program.    albuterol/ipratropium for Nebulization 3 milliLiter(s) Nebulizer every 6 hours PRN  ALPRAZolam 0.25 milliGRAM(s) Oral every 6 hours PRN  aluminum hydroxide/magnesium hydroxide/simethicone Suspension 30 milliLiter(s) Oral every 4 hours PRN  ammonium lactate 12% Lotion 1 Application(s) Topical every 12 hours PRN  apixaban 5 milliGRAM(s) Oral every 12 hours  artificial  tears Solution 1 Drop(s) Both EYES every 12 hours  ascorbic acid 500 milliGRAM(s) Oral daily  atovaquone  Suspension 1500 milliGRAM(s) Oral daily  benzocaine/menthol Lozenge 1 Lozenge Oral two times a day PRN  benzonatate 100 milliGRAM(s) Oral three times a day PRN  bisacodyl 5 milliGRAM(s) Oral <User Schedule> PRN  dextrose 5%. 1000 milliLiter(s) IV Continuous <Continuous>  dextrose 5%. 1000 milliLiter(s) IV Continuous <Continuous>  dextrose 50% Injectable 25 Gram(s) IV Push once  dextrose 50% Injectable 25 Gram(s) IV Push once  dextrose 50% Injectable 12.5 Gram(s) IV Push once  dextrose Oral Gel 15 Gram(s) Oral once PRN  folic acid 1 milliGRAM(s) Oral daily  gabapentin 300 milliGRAM(s) Oral three times a day  glucagon  Injectable 1 milliGRAM(s) IntraMuscular once  hydrocortisone hemorrhoidal Suppository 1 Suppository(s) Rectal two times a day PRN  lactobacillus acidophilus 1 Tablet(s) Oral two times a day with meals  loperamide 2 milliGRAM(s) Oral three times a day PRN  melatonin 6 milliGRAM(s) Oral at bedtime  methylPREDNISolone 36 milliGRAM(s) Oral daily  metoprolol tartrate 12.5 milliGRAM(s) Oral two times a day  multivitamin 1 Tablet(s) Oral daily  nystatin Powder 1 Application(s) Topical two times a day  pantoprazole    Tablet 40 milliGRAM(s) Oral before breakfast  polyethylene glycol 3350 17 Gram(s) Oral daily PRN  psyllium Powder 1 Packet(s) Oral daily PRN  SIMETHICONE SOFTGELS 250 milliGRAM(s),SIMETHICONE SOFTGELS 250 milliGRAM(s) 250 milliGRAM(s) Oral three times a day PRN  sodium chloride 0.65% Nasal 1 Spray(s) Both Nostrils every 8 hours PRN  sodium chloride 0.9% lock flush 5 milliLiter(s) IV Push two times a day  zinc oxide 40% Paste 1 Application(s) Topical three times a day      T(C): 36.4 (11-26-23 @ 08:50), Max: 36.8 (11-25-23 @ 20:51)  HR: 80 (11-26-23 @ 08:50) (79 - 80)  BP: 102/63 (11-26-23 @ 08:50) (102/63 - 152/78)  RR: 16 (11-26-23 @ 08:50) (16 - 16)  SpO2: 92% (11-26-23 @ 08:50) (92% - 93%)    In NAD  HEENT- EOMI  Heart- No cyanosis   Lungs- No respiratory distress   Abd- NT  Ext- No calf pain  Neuro- Exam unchanged    Imp: Patient with diagnosis of Interstitial Lung Disease and Mixed connective tissue disease admitted for comprehensive acute rehabilitation.    Plan:  -A-fib - metoprolol  -Neuropathy - gabapentin   -Impaired mobility - Continue PT/OT  - DVT prophylaxis - apixaban   -Sleep Program/Insomnia -  melatonin   -Constipation -  Miralax   - Skin- Turn q2h, check skin daily  - Continue current medications; patient medically stable.   - Patient is stable to continue current rehabilitation program.

## 2023-11-27 PROCEDURE — 99233 SBSQ HOSP IP/OBS HIGH 50: CPT

## 2023-11-27 PROCEDURE — 99232 SBSQ HOSP IP/OBS MODERATE 35: CPT

## 2023-11-27 RX ADMIN — GABAPENTIN 300 MILLIGRAM(S): 400 CAPSULE ORAL at 13:29

## 2023-11-27 RX ADMIN — Medication 1 TABLET(S): at 08:30

## 2023-11-27 RX ADMIN — Medication 1 TABLET(S): at 20:57

## 2023-11-27 RX ADMIN — NYSTATIN CREAM 1 APPLICATION(S): 100000 CREAM TOPICAL at 20:56

## 2023-11-27 RX ADMIN — Medication 6 MILLIGRAM(S): at 20:57

## 2023-11-27 RX ADMIN — SODIUM CHLORIDE 5 MILLILITER(S): 9 INJECTION INTRAMUSCULAR; INTRAVENOUS; SUBCUTANEOUS at 08:26

## 2023-11-27 RX ADMIN — ZINC OXIDE 1 APPLICATION(S): 200 OINTMENT TOPICAL at 13:14

## 2023-11-27 RX ADMIN — APIXABAN 5 MILLIGRAM(S): 2.5 TABLET, FILM COATED ORAL at 08:30

## 2023-11-27 RX ADMIN — Medication 1 MILLIGRAM(S): at 20:57

## 2023-11-27 RX ADMIN — APIXABAN 5 MILLIGRAM(S): 2.5 TABLET, FILM COATED ORAL at 20:57

## 2023-11-27 RX ADMIN — ATOVAQUONE 1500 MILLIGRAM(S): 750 SUSPENSION ORAL at 20:56

## 2023-11-27 RX ADMIN — Medication 12.5 MILLIGRAM(S): at 08:30

## 2023-11-27 RX ADMIN — GABAPENTIN 300 MILLIGRAM(S): 400 CAPSULE ORAL at 20:58

## 2023-11-27 RX ADMIN — NYSTATIN CREAM 1 APPLICATION(S): 100000 CREAM TOPICAL at 08:31

## 2023-11-27 RX ADMIN — Medication 1 DROP(S): at 08:31

## 2023-11-27 RX ADMIN — ZINC OXIDE 1 APPLICATION(S): 200 OINTMENT TOPICAL at 20:58

## 2023-11-27 RX ADMIN — ZINC OXIDE 1 APPLICATION(S): 200 OINTMENT TOPICAL at 08:31

## 2023-11-27 RX ADMIN — Medication 12.5 MILLIGRAM(S): at 20:57

## 2023-11-27 RX ADMIN — GABAPENTIN 300 MILLIGRAM(S): 400 CAPSULE ORAL at 08:30

## 2023-11-27 RX ADMIN — Medication 500 MILLIGRAM(S): at 20:58

## 2023-11-27 RX ADMIN — PANTOPRAZOLE SODIUM 40 MILLIGRAM(S): 20 TABLET, DELAYED RELEASE ORAL at 08:30

## 2023-11-27 RX ADMIN — Medication 1 DROP(S): at 20:56

## 2023-11-27 RX ADMIN — Medication 36 MILLIGRAM(S): at 08:52

## 2023-11-27 NOTE — PROGRESS NOTE ADULT - ASSESSMENT
62F with AF, hx sciatica, newly diagnosed ILD (likely associated with MCTD +ARIANA, +RNP, +Sm) with exacerbation requiring ECMO / plasmapharesis / Rituximab / steriods, now at acute rehab      #abdominal discomfort- resolved  - abd xray: non-specific gas. reviewed by me. f/u final result  - supportive care     #Transaminitis  - LFT trending down.  - limit Tylenol use, avoid statin and other hepatoxic meds  - monitor    #chronic macrocytic anemia  - H/H remains stable  - on MVI, folate and thiamine    #hyponatremia,   -resolved     #leukocytosis due to steroid use  - improving  - non-focal    #Interstitial Lung Disease  #Mixed connective tissue disease  #Suspect critical illness myopathy from prolonged ICU stay, resolved  - CT 11/10 new predominant peripheral reticular opacities, within lung paranchyma; no significant bronchiectasis or honeycombing.   -c/w steroids - taper as scheduled (2 week intervals)   -c/w nebs  -c/w PT/OT  -Mepron for PCP ppx while on steroids   -check ambulatory O2 sat periodically    #PAF  #Non-occlusive right IJ thrombus   -c/w Eliquis  -c/w lopressor 12.5mg po BID  -goal HR for pAfib is <110    #Anxiety  -appreciate psych follow-up  -c/w Xanax PRN    # ?JACEY  # nocturnal desat  - discussed about CPAP use. oxygen use during sleep and PRN. patient may need OP sleep study. patient is aware. she will speak to her pulm. informed RN to check ambulatory O2 sat during therapy and at rest.     #  Severe protein-calorie malnutrition.  - nutrition on board    #DVT ppx: Eliquis

## 2023-11-27 NOTE — CHART NOTE - NSCHARTNOTEFT_GEN_A_CORE
Nutrition Follow Up Note  Source: Medical Record [X] Patient [X] Family [ ]         Diet: Regular, Glucerna BID  Pt tolerating diet with good PO intake, eating >75% of meals per nursing flow sheets. Pt reports good PO intake. C/o constipation which was relieved with a suppository. Pt receives fresh fruit daily to help w/ bowel movements.    Enteral/Parenteral Nutrition: N/A    Current Weight: 256.6 lbs (10-5)    Pertinent Medications: MEDICATIONS  (STANDING):  apixaban 5 milliGRAM(s) Oral every 12 hours  artificial  tears Solution 1 Drop(s) Both EYES every 12 hours  ascorbic acid 500 milliGRAM(s) Oral daily  atovaquone  Suspension 1500 milliGRAM(s) Oral daily  dextrose 5%. 1000 milliLiter(s) (100 mL/Hr) IV Continuous <Continuous>  dextrose 5%. 1000 milliLiter(s) (50 mL/Hr) IV Continuous <Continuous>  dextrose 50% Injectable 12.5 Gram(s) IV Push once  dextrose 50% Injectable 25 Gram(s) IV Push once  dextrose 50% Injectable 25 Gram(s) IV Push once  folic acid 1 milliGRAM(s) Oral daily  gabapentin 300 milliGRAM(s) Oral three times a day  glucagon  Injectable 1 milliGRAM(s) IntraMuscular once  lactobacillus acidophilus 1 Tablet(s) Oral two times a day with meals  melatonin 6 milliGRAM(s) Oral at bedtime  methylPREDNISolone 36 milliGRAM(s) Oral daily  metoprolol tartrate 12.5 milliGRAM(s) Oral two times a day  multivitamin 1 Tablet(s) Oral daily  nystatin Powder 1 Application(s) Topical two times a day  pantoprazole    Tablet 40 milliGRAM(s) Oral before breakfast  sodium chloride 0.9% lock flush 5 milliLiter(s) IV Push two times a day  zinc oxide 40% Paste 1 Application(s) Topical three times a day    MEDICATIONS  (PRN):  albuterol/ipratropium for Nebulization 3 milliLiter(s) Nebulizer every 6 hours PRN Shortness of Breath and/or Wheezing  ALPRAZolam 0.25 milliGRAM(s) Oral every 6 hours PRN Anxiety  aluminum hydroxide/magnesium hydroxide/simethicone Suspension 30 milliLiter(s) Oral every 4 hours PRN Dyspepsia  ammonium lactate 12% Lotion 1 Application(s) Topical every 12 hours PRN BL feet dryness  benzocaine/menthol Lozenge 1 Lozenge Oral two times a day PRN Sore Throat  benzonatate 100 milliGRAM(s) Oral three times a day PRN Cough  bisacodyl 5 milliGRAM(s) Oral <User Schedule> PRN Constipation  dextrose Oral Gel 15 Gram(s) Oral once PRN Blood Glucose LESS THAN 70 milliGRAM(s)/deciliter  hydrocortisone hemorrhoidal Suppository 1 Suppository(s) Rectal two times a day PRN Hemorrhoids  loperamide 2 milliGRAM(s) Oral three times a day PRN Diarrhea  polyethylene glycol 3350 17 Gram(s) Oral daily PRN Constipation  psyllium Powder 1 Packet(s) Oral daily PRN Constipation  SIMETHICONE SOFTGELS 250 milliGRAM(s),SIMETHICONE SOFTGELS 250 milliGRAM(s) 250 milliGRAM(s) Oral three times a day PRN for gas  sodium chloride 0.65% Nasal 1 Spray(s) Both Nostrils every 8 hours PRN Nasal Congestion      Pertinent Labs:   11-23 Alb 2.7 g/dL<L>        Skin: groin wound, Moisture associated dermatitis per nursing flow sheets.     Edema: No edema per nursing flow sheets     Last BM: on 11/27 per pt    Estimated Needs:   [X] No Change since Previous Assessment  [ ] Recalculated:     Previous Nutrition Diagnosis:   1. Severe malnutrition (resolved) - pt consistently eating >75% of meals  2. Increased nutrient needs - addressed with Glucerna BID    Nutrition Diagnosis is [X] Ongoing         New Nutrition Diagnosis: [X] Not Applicable  [ ] Inadequate Protein Energy Intake   [ ] Inadequate Oral Intake   [ ] Excessive Energy Intake   [ ] Increased Nutrient Needs   [ ] Obesity   [ ] Altered GI Function   [ ] Unintended Weight Loss   [ ] Food & Nutrition Related Knowledge Deficit  [ ] Limited Adherence to nutrition related recommendations   [ ] Malnutrition      Interventions:   1. Recommend continuing with current plan of care  2. Encourage PO intake  3. Obtain and honor food preferences as able    Monitoring & Evaluation:   [X] Weights   [X] PO Intake   [X] Follow Up (Per Protocol)  [X] Tolerance to Diet Prescription   [X] Other: Labs     RD Remains Available.  Desire Livingston RD

## 2023-11-27 NOTE — PROGRESS NOTE ADULT - ASSESSMENT
Assessment/Plan:  ALIZA FOLEY is a 62 year old female with PMH of pAFIB and sciatica; who presented to [facility] ED with SOB, and was found to have groundglass opacities and interstitial lung disease. She was treated with empiric Vancomycin and Zosyn. She underwent a bronchoscopy with bronchoalveolar lavage and pulse steroids. She was given IV diuretics for increased pulmonary congestion, but her respiratory status continued to worsen.  On 9/13, she was transferred to [facility] for ECMO requirements. She was further treated with Plasmapheresis, Rituximab and high-dose steroids, with significant improvement. Her overall hospital course was complicated by thrombocytopenia (s/p several platelet transfusions), leukocytosis (infectious workup entirely negative- s/p Vanco and Ceftriaxone), AFIB with RVR (resolved with Metoprolol), dysphagia (requiring NGT), transaminitis (secondary to acute illness and hypotension),  non-occlusive RIJ DVT (started on Lovenox). Patient now admitted for a multidisciplinary rehab program. 10-05-23 @ 13:18    * Sustained functional improvement including progress with Wt bearing LE and improvement with ability at transfers, concentrate on prox LE muscle strengthening  * Labs discussed, unremarkable   * Constipation--prefers to continue miralax prn  * Abdominal XR to rule out ileus- shown to have moderate stool burden    #Interstitial Lung Disease and Mixed connective tissue disease  - Gait Instability, ADL impairments and Functional impairments: start Comprehensive Rehab Program of PT/OT/SLP - 3 hours a day, 5 days a week  - P&O as needed   - Non-specific Interstitial Lung Disease  - Requiring ECMO   - Improvement s/p Plasmapheresis, Rituximab and high- dose steroids   - Albuterol/Ipratropium Nebulizer every 6 hours  - Mepron 1500mg daily  - PO Medrol ( due to liver dysfunction): Plan will be to taper by ~20% every 2 weeks    Medrol   64mg x 2 weeks   56mg x 2 weeks  44mg x 2 weeks   36mg x 2 weeks - Follow up Outpatient   - Plan to wean 02 as tolerated, Continue tapering oxygen,  -  CT Chest noted 11/10---Resp f/u review  11/14, appreciated  They reports sustained improvement, clinical (normal oxy sats on R/A, sustained progress with oxy tapering), and radiological (no acute findings on recent CT Chest, rather residual chronic infiltrative diesease noted, with recommendation to repeat CT after Acute rehab treatment     #Posttnasal drip--ENT review 11/7, declined scope, continue flonase     #pAFIB/Rt Ij thrombus  - Metoprolol 25mg BID and lovenox  --- Eliquis 5mg BID - tolerating well     # Chronic Tinnitus   - ENT review and recs appreciate, Patient already made ENT appointment for f/u    # Lymphedema both legs -chronic and Rt IJ thrombus  - ACE Wrap BL LEs  - BL LE doppler negative for DVT  - BL UE doppler with chronic thrombotic changes in RIJ     #Transaminitis --LFT Down trending   - Secondary to acute illness and hypotension at [facility]  - Outpatient follow up     #Leucocytosis--steroid related, continue monitoring     #Neuropathy  - Gabapentin 300 mg BID, will consider increasing dose, increase to TID 11/10  --Work on motor strengthening, priority for UE as preferred by patient   - Vit b12 >2,000 in 9/2023  --Will consider Rhuematology f/u if needed    #Sleep/Mood  - Melatonin 6mg at HS  - Psych rec Xanax 0.25mg PRN    #Skin  - Skin: Left lower abdomen ruptured blister 3.0 x 1.0, stage 2 pressure injury to right buttock 4 x1 and left buttock 3x1, stage 2 pressure injury ti right inner thigh 0.2 x 0.2, right groin wound 10.5 x 2.0,   Wound care review, Left groin wound 11/15--- 0.6x1.8.0.1cm, no slough, healthy base  -- MAD to skin folds- Nystatin to submammary and abdominal pannus BID  -- MAD to L groin- cleanse with NS, Triad cream daily  -- Mad to R groin- Nystatin powder daily   -- R groin wound-- aquacel packing, Triad to periwound, Allevyn foam- daily and PRN   - Pressure injury/Skin: OOB to Chair, PT/OT   - Offload pressure, low air loss support surfaces, T&P every 2 hours   --Wound care consult-10/9 -Multiple wounds--perineal and buttock/sacral, recs appreciated   --Suggest Continue treatments as below:   Twice daily & PRN Normal Saline Cleanse of abdominal pannus & breast folds, perineal, Left & Right inner buttock erosions.  Pat thoroughly dry.   Apply Desitin generously twice daily (& PRN) to perineal, buttocks, and left groin erosions, leave open to air.    Apply Nystatin powder to abdominal pannus and breast folds.  Suggest use of Interdry cloths in folds to 'wick' moisture.  Suggest daily Normal Saline Cleanse of right groin surgical wound, pat dry.  Apply Cavillon film barrier to intact periwound skin.  Place Aquacell strip over open area, cover with foam dressing.  - Wound care following     #Pain Mgmt   - Tylenol PRN   - Gabapentin 300mg at HS     #GI/Bowel Mgmt/Hx of alternating bowel movements  - Pantoprazole  - PRN: Maalox   --Lactobacillus BID   --* Constipation--prefers to continue miralax prn  - Abdominal XR to rule out ileus - shown to have moderate stool burden    #/Bladder Mgmt   -Spontaneously voiding   - UA (11/3) negative    #FEN   #Dysphagia  - Diet - Minced & Moist  [CC]    - Dysphagia- SLP evaluation     #Health maintenance  - Folic Acid   - MVI  - Ocean nasal spray    # Difficulty with access to peripheral veins-- Blood draws from Left arm Medline     #Precautions / PROPHYLAXIS:   - Falls  - ortho: Weight bearing status: WBAT   - Lungs: Aspiration, Incentive Spirometer   - DVT PPX: Eliquis 5 mg BID   ----------------------------------------  11/20 --Labs CBC mild anemia, normal renal function, LFT elevated, downtrending   ----------------------------------------  Liaison with other providers/agencies    PEER TO PEER with Dr Tripp (patient's insurance company)  * Peer to Peer completed, insurance approval retrospectively given to cover from 11/14 to 11/20, Insurance co awaits updated notes to determine further approval of coverage   SW team to send updated clinical and functional notes to medical insurance company  ----------------------------------------  IDT conference on 11/22  Social Work: Approved with insurance til 11/26  SLP: --  OT: Sup for UBD/CGA. Min A for LBD. Total A for all other ADLs/transfers.  PT: Mod A for transfers with SB. 5 steps in parallel bars with Max A of 3. 150ft WC mobility with CGA.   Amb 50 ft with hoyerlift and cardiac walker.   RT: --  DME: Damaris, SB, hip kit, hospital bed, WC.   Barriers: Weakness.   Functional level on DC: Min A with SB transfers.   TDD: 12/6 to home.  ----------------------------------------  OUTPATIENT/FOLLOW UP:    Trae Whitney  Pulmonary Disease  [address], [address] [place] [address]  Phone: [phone]  Fax: [phone]  Follow Up Time: 2 weeks    Ryanne Allen  Rheumatology  [address] [address]  Phone: [phone]  Fax: [phone]  Follow Up Time: 1 week    Kyle Pierce  Internal Medicine  [address] [place] [address], [address]  [place] [address]  Phone: [phone]  Fax: [phone]  Follow Up Time: 2 weeks    Addy Powers  Pulmonary Disease  [address] [facility], [facility] [address]  [place], [place] [id number]  Phone: [phone]  Fax: [phone]  Follow Up Time:     Kwame Hawley  Critical Care Medicine  [address] [facility], [address]  [place] [address]  Phone: [phone]  Fax: [phone]  Follow Up Time:     Neena Borrego  Rheumatology  [address] [place] [address], [address]  [place] [address]  Phone: [phone]  Fax: [phone]  Follow Up Time:    [facility]  [address]  Scheduled Appointment: 09/13/2023  2:40 PM     Assessment/Plan:  ALIZA FOLEY is a 62 year old female with PMH of pAFIB and sciatica; who presented to [facility] ED with SOB, and was found to have groundglass opacities and interstitial lung disease. She was treated with empiric Vancomycin and Zosyn. She underwent a bronchoscopy with bronchoalveolar lavage and pulse steroids. She was given IV diuretics for increased pulmonary congestion, but her respiratory status continued to worsen.  On 9/13, she was transferred to [facility] for ECMO requirements. She was further treated with Plasmapheresis, Rituximab and high-dose steroids, with significant improvement. Her overall hospital course was complicated by thrombocytopenia (s/p several platelet transfusions), leukocytosis (infectious workup entirely negative- s/p Vanco and Ceftriaxone), AFIB with RVR (resolved with Metoprolol), dysphagia (requiring NGT), transaminitis (secondary to acute illness and hypotension),  non-occlusive RIJ DVT (started on Lovenox). Patient now admitted for a multidisciplinary rehab program. 10-05-23 @ 13:18    * Sustained functional improvement including progress with Wt bearing LE and improvement with ability at transfers, concentrate on prox LE muscle strengthening  * Making progress with slide board transfers   * Labs awaited due to no access this time  * Constipation-responded to suppository  * ICU consult for new medline     #Interstitial Lung Disease and Mixed connective tissue disease  - Gait Instability, ADL impairments and Functional impairments: start Comprehensive Rehab Program of PT/OT/SLP - 3 hours a day, 5 days a week  - P&O as needed   - Non-specific Interstitial Lung Disease  - Requiring ECMO   - Improvement s/p Plasmapheresis, Rituximab and high- dose steroids   - Albuterol/Ipratropium Nebulizer every 6 hours  - Mepron 1500mg daily  - PO Medrol ( due to liver dysfunction): Plan will be to taper by ~20% every 2 weeks    Medrol   64mg x 2 weeks   56mg x 2 weeks  44mg x 2 weeks   36mg x 2 weeks - Follow up Outpatient   - Plan to wean 02 as tolerated, Continue tapering oxygen,  -  CT Chest noted 11/10---Resp f/u review  11/14, appreciated  They reports sustained improvement, clinical (normal oxy sats on R/A, sustained progress with oxy tapering), and radiological (no acute findings on recent CT Chest, rather residual chronic infiltrative diesease noted, with recommendation to repeat CT after Acute rehab treatment     #Posttnasal drip--ENT review 11/7, declined scope, continue flonase     #pAFIB/Rt Ij thrombus  - Metoprolol 25mg BID and lovenox  --- Eliquis 5mg BID - tolerating well     # Chronic Tinnitus   - ENT review and recs appreciate, Patient already made ENT appointment for f/u    # Lymphedema both legs -chronic and Rt IJ thrombus  - ACE Wrap BL LEs  - BL LE doppler negative for DVT  - BL UE doppler with chronic thrombotic changes in RIJ     #Transaminitis --LFT Down trending   - Secondary to acute illness and hypotension at [facility]  - Outpatient follow up     #Leucocytosis--steroid related, continue monitoring     #Neuropathy  - Gabapentin 300 mg BID, will consider increasing dose, increase to TID 11/10  --Work on motor strengthening, priority for UE as preferred by patient   - Vit b12 >2,000 in 9/2023  --Will consider Rhuematology f/u if needed    #Sleep/Mood  - Melatonin 6mg at HS  - Psych rec Xanax 0.25mg PRN    #Skin  - Skin: Left lower abdomen ruptured blister 3.0 x 1.0, stage 2 pressure injury to right buttock 4 x1 and left buttock 3x1, stage 2 pressure injury ti right inner thigh 0.2 x 0.2, right groin wound 10.5 x 2.0,   Wound care review, Left groin wound 11/15--- 0.6x1.8.0.1cm, no slough, healthy base  -- MAD to skin folds- Nystatin to submammary and abdominal pannus BID  -- MAD to L groin- cleanse with NS, Triad cream daily  -- Mad to R groin- Nystatin powder daily   -- R groin wound-- aquacel packing, Triad to periwound, Allevyn foam- daily and PRN   - Pressure injury/Skin: OOB to Chair, PT/OT   - Offload pressure, low air loss support surfaces, T&P every 2 hours   --Wound care consult-10/9 -Multiple wounds--perineal and buttock/sacral, recs appreciated   --Suggest Continue treatments as below:   Twice daily & PRN Normal Saline Cleanse of abdominal pannus & breast folds, perineal, Left & Right inner buttock erosions.  Pat thoroughly dry.   Apply Desitin generously twice daily (& PRN) to perineal, buttocks, and left groin erosions, leave open to air.    Apply Nystatin powder to abdominal pannus and breast folds.  Suggest use of Interdry cloths in folds to 'wick' moisture.  Suggest daily Normal Saline Cleanse of right groin surgical wound, pat dry.  Apply Cavillon film barrier to intact periwound skin.  Place Aquacell strip over open area, cover with foam dressing.  - Wound care following     #Pain Mgmt   - Tylenol PRN   - Gabapentin 300mg at HS     #GI/Bowel Mgmt/Hx of alternating bowel movements  - Pantoprazole  - PRN: Maalox   --Lactobacillus BID     #/Bladder Mgmt   -Spontaneously voiding   - UA (11/3) negative    #FEN   #Dysphagia  - Diet - Minced & Moist  [CC]    - Dysphagia- SLP evaluation     #Health maintenance  - Folic Acid   - MVI  - Ocean nasal spray    # Difficulty with access to peripheral veins-- Blood draws from Left arm Medline     #Precautions / PROPHYLAXIS:   - Falls  - ortho: Weight bearing status: WBAT   - Lungs: Aspiration, Incentive Spirometer   - DVT PPX: Eliquis 5 mg BID   ----------------------------------------  11/20 --Labs CBC mild anemia, normal renal function, LFT elevated, downtrending   ----------------------------------------  Liaison with other providers/agencies    PEER TO PEER with Dr Tripp (patient's insurance company)  * Peer to Peer completed, insurance approval retrospectively given to cover from 11/14 to 11/20, Insurance co awaits updated notes to determine further approval of coverage   SW team to send updated clinical and functional notes to medical insurance company  ----------------------------------------  IDT conference on 11/22  Social Work: Approved with insurance til 11/26  SLP: --  OT: Sup for UBD/CGA. Min A for LBD. Total A for all other ADLs/transfers.  PT: Mod A for transfers with SB. 5 steps in parallel bars with Max A of 3. 150ft WC mobility with CGA.   Amb 50 ft with hoyerlift and cardiac walker.   RT: --n/a   DME: AYLIN Ocampo, hip kit, hospital bed, WC.   Barriers: Weakness.   Functional level on DC: Min A with SB transfers.   TDD: 12/6 to home.  ----------------------------------------  OUTPATIENT/FOLLOW UP:    Trae Whitney  Pulmonary Disease  [address], [address] [place] [address]  Phone: [phone]  Fax: [phone]  Follow Up Time: 2 weeks    Ryanne Allen  Rheumatology  [address] [address]  Phone: [phone]  Fax: [phone]  Follow Up Time: 1 week    Kyle Pierce  Internal Medicine  [address] [place] [address], [address]  [place] [address]  Phone: [phone]  Fax: [phone]  Follow Up Time: 2 weeks    Addy Powers  Pulmonary Disease  [address] [facility], [address]  [place], [place] [id number]  Phone: [phone]  Fax: [phone]  Follow Up Time:     Kwame Hawley  Critical Care Medicine  [address] [facility], [address]  [place] [address]  Phone: [phone]  Fax: [phone]  Follow Up Time:     Neena Borrego  Rheumatology  [address] [place] [address], [address]  [place] [address]  Phone: [phone]  Fax: [phone]  Follow Up Time:    [facility]  [address]  Scheduled Appointment: 09/13/2023  2:40 PM     Assessment/Plan:  ALIZA FOLEY is a 62 year old female with PMH of pAFIB and sciatica; who presented to [facility] ED with SOB, and was found to have groundglass opacities and interstitial lung disease. She was treated with empiric Vancomycin and Zosyn. She underwent a bronchoscopy with bronchoalveolar lavage and pulse steroids. She was given IV diuretics for increased pulmonary congestion, but her respiratory status continued to worsen.  On 9/13, she was transferred to [facility] for ECMO requirements. She was further treated with Plasmapheresis, Rituximab and high-dose steroids, with significant improvement. Her overall hospital course was complicated by thrombocytopenia (s/p several platelet transfusions), leukocytosis (infectious workup entirely negative- s/p Vanco and Ceftriaxone), AFIB with RVR (resolved with Metoprolol), dysphagia (requiring NGT), transaminitis (secondary to acute illness and hypotension),  non-occlusive RIJ DVT (started on Lovenox). Patient now admitted for a multidisciplinary rehab program. 10-05-23 @ 13:18    * Sustained functional improvement including progress with Wt bearing LE and improvement with ability at transfers, concentrate on prox LE muscle strengthening  * Making progress with slide board transfers, ambulating with walker today minimal distance   * Labs awaited due to no access this time  * Constipation-responded to suppository  * ICU consult for new medline     #Interstitial Lung Disease and Mixed connective tissue disease  - Gait Instability, ADL impairments and Functional impairments: start Comprehensive Rehab Program of PT/OT/SLP - 3 hours a day, 5 days a week  - P&O as needed   - Non-specific Interstitial Lung Disease  - Requiring ECMO   - Improvement s/p Plasmapheresis, Rituximab and high- dose steroids   - Albuterol/Ipratropium Nebulizer every 6 hours  - Mepron 1500mg daily  - PO Medrol ( due to liver dysfunction): Plan will be to taper by ~20% every 2 weeks    Medrol   64mg x 2 weeks   56mg x 2 weeks  44mg x 2 weeks   36mg x 2 weeks - Follow up Outpatient   - Plan to wean 02 as tolerated, Continue tapering oxygen,  -  CT Chest noted 11/10---Resp f/u review  11/14, appreciated  They reports sustained improvement, clinical (normal oxy sats on R/A, sustained progress with oxy tapering), and radiological (no acute findings on recent CT Chest, rather residual chronic infiltrative diesease noted, with recommendation to repeat CT after Acute rehab treatment     #Posttnasal drip--ENT review 11/7, declined scope, continue flonase     #pAFIB/Rt Ij thrombus  - Metoprolol 25mg BID and lovenox  --- Eliquis 5mg BID - tolerating well     # Chronic Tinnitus   - ENT review and recs appreciate, Patient already made ENT appointment for f/u    # Lymphedema both legs -chronic and Rt IJ thrombus  - ACE Wrap BL LEs  - BL LE doppler negative for DVT  - BL UE doppler with chronic thrombotic changes in RIJ     #Transaminitis --LFT Down trending   - Secondary to acute illness and hypotension at [facility]  - Outpatient follow up     #Leucocytosis--steroid related, continue monitoring     #Neuropathy  - Gabapentin 300 mg BID, will consider increasing dose, increase to TID 11/10  --Work on motor strengthening, priority for UE as preferred by patient   - Vit b12 >2,000 in 9/2023  --Will consider Rhuematology f/u if needed    #Sleep/Mood  - Melatonin 6mg at HS  - Psych rec Xanax 0.25mg PRN    #Skin  - Skin: Left lower abdomen ruptured blister 3.0 x 1.0, stage 2 pressure injury to right buttock 4 x1 and left buttock 3x1, stage 2 pressure injury ti right inner thigh 0.2 x 0.2, right groin wound 10.5 x 2.0,   Wound care review, Left groin wound 11/15--- 0.6x1.8.0.1cm, no slough, healthy base  -- MAD to skin folds- Nystatin to submammary and abdominal pannus BID  -- MAD to L groin- cleanse with NS, Triad cream daily  -- Mad to R groin- Nystatin powder daily   -- R groin wound-- aquacel packing, Triad to periwound, Allevyn foam- daily and PRN   - Pressure injury/Skin: OOB to Chair, PT/OT   - Offload pressure, low air loss support surfaces, T&P every 2 hours   --Wound care consult-10/9 -Multiple wounds--perineal and buttock/sacral, recs appreciated   --Suggest Continue treatments as below:   Twice daily & PRN Normal Saline Cleanse of abdominal pannus & breast folds, perineal, Left & Right inner buttock erosions.  Pat thoroughly dry.   Apply Desitin generously twice daily (& PRN) to perineal, buttocks, and left groin erosions, leave open to air.    Apply Nystatin powder to abdominal pannus and breast folds.  Suggest use of Interdry cloths in folds to 'wick' moisture.  Suggest daily Normal Saline Cleanse of right groin surgical wound, pat dry.  Apply Cavillon film barrier to intact periwound skin.  Place Aquacell strip over open area, cover with foam dressing.  - Wound care following     #Pain Mgmt   - Tylenol PRN   - Gabapentin 300mg at HS     #GI/Bowel Mgmt/Hx of alternating bowel movements  - Pantoprazole  - PRN: Maalox   --Lactobacillus BID     #/Bladder Mgmt   -Spontaneously voiding   - UA (11/3) negative    #FEN   #Dysphagia  - Diet - Minced & Moist  [CC]    - Dysphagia- SLP evaluation     #Health maintenance  - Folic Acid   - MVI  - Ocean nasal spray    # Difficulty with access to peripheral veins-- Blood draws from Left arm Medline     #Precautions / PROPHYLAXIS:   - Falls  - ortho: Weight bearing status: WBAT   - Lungs: Aspiration, Incentive Spirometer   - DVT PPX: Eliquis 5 mg BID   ----------------------------------------  11/20 --Labs CBC mild anemia, normal renal function, LFT elevated, downtrending   ----------------------------------------  Liaison with other providers/agencies    PEER TO PEER with Dr Tripp (patient's insurance company)  * Peer to Peer completed, insurance approval retrospectively given to cover from 11/14 to 11/20, Insurance co awaits updated notes to determine further approval of coverage   SW team to send updated clinical and functional notes to medical insurance company  ----------------------------------------  IDT conference on 11/22  Social Work: Approved with insurance til 11/26  SLP: --  OT: Sup for UBD/CGA. Min A for LBD. Total A for all other ADLs/transfers.  PT: Mod A for transfers with SB. 5 steps in parallel bars with Max A of 3. 150ft WC mobility with CGA.   Amb 50 ft with hoyerlift and cardiac walker.   RT: --n/a   DME: AYLIN Ocampo, hip kit, hospital bed, WC.   Barriers: Weakness.   Functional level on DC: Min A with SB transfers.   TDD: 12/6 to home.  ----------------------------------------  OUTPATIENT/FOLLOW UP:    Trae Whitney  Pulmonary Disease  [address], [address] [place] [address]  Phone: [phone]  Fax: [phone]  Follow Up Time: 2 weeks    Ryanne Allen  Rheumatology  [address] [address]  Phone: [phone]  Fax: [phone]  Follow Up Time: 1 week    Kyle Pierce  Internal Medicine  [address] [place] [address], [address]  [place] [address]  Phone: [phone]  Fax: [phone]  Follow Up Time: 2 weeks    Addy Powers  Pulmonary Disease  [address] [facility], [facility] [address]  [place], [place] [id number]  Phone: [phone]  Fax: [phone]  Follow Up Time:     Kwame Hawley  Critical Care Medicine  [address] [facility], [facility] [address]  [place] [address]  Phone: [phone]  Fax: [phone]  Follow Up Time:     Neena Borrego  Rheumatology  [address] [place] [address], [address]  [place] [address]  Phone: [phone]  Fax: [phone]  Follow Up Time:    [facility]  [facility] [address] [facility]  Scheduled Appointment: 09/13/2023  2:40 PM

## 2023-11-27 NOTE — PROGRESS NOTE ADULT - SUBJECTIVE AND OBJECTIVE BOX
SUBJECTIVE/ROS:  Patient seen and examined at bedside   Reports normal BM yesterday post suppository  Reports some discomfort Rt perineal area, feels she may have pulled a muscle during therapy, but not acute pain or redness  Tolerating oral diet  Tingling/numbness of fingers, non progressive over fingers/toes and over small area on Rt lower shin, and left post scalp  Tolerating low dose gabapentin     RN reports that left arm medline is non functional  Hence labs not yet drawn  Patient has diffcult access     ROS--No chest or abd pain, no palpitations nausea or vomiting  Tingling/numbness as noted  LBM 11/26    Therapy.  Motor strength at both ankles and knee continue to improve  Continues to ambulate functional distance with manual WC, without assistance   slide board transfer bed to   Sustained improvement of upper extremity function and strength    Vital Signs Last 24 Hrs  T(C): 36.5 (27 Nov 2023 08:20), Max: 37.2 (26 Nov 2023 22:38)  T(F): 97.7 (27 Nov 2023 08:20), Max: 98.9 (26 Nov 2023 22:38)  HR: 77 (27 Nov 2023 08:20) (77 - 88)  BP: 122/73 (27 Nov 2023 08:20) (109/66 - 122/73)  RR: 16 (27 Nov 2023 08:20) (16 - 16)  SpO2: 98% (27 Nov 2023 08:20) (90% - 98%)  O2 Parameters below as of 27 Nov 2023 08:20  Patient On (Oxygen Delivery Method): nasal cannula  O2 Flow (L/min): 1        PHYSICAL EXAM:  Gen -  Comfortable,  at bedside -normal oxy sat and subsequently self ambulating   Pulm - clear  Cardiovascular - HS i and II intermittently irregular  Abdomen - Soft, non tender,   Extremities - edema to b/l LE, no calf tenderness, LUE Med line    Neuro-  Cognitive - awake, alert, fully oriented,  Light tough sensation diffusely reduced on fingers and dorsal foot, and over right lower jaw, localized area approx 2 cm, no skin changes noted, non progressive   Motor -                    LEFT    UE - 4/5                    RIGHT UE - 4/5                     LEFT    LE - 3+/5, distally and 3/5 proximal                    RIGHT LE - Ankles PF 3/5 ,DF 2/5, Knee ext 3/5 Hips limited by pain Rt hip due to ulcer at ECHO access site   Sensory - decreased light tough sensation fingers and sole of foot, difusely  MSK: improvement with motor strength  Psychiatric - Mood stable, Affect WNL  Skin -- , stage 2 pressure injury to right buttock 4 x1 and left buttock 3x1, stage 2 pressure injury ti right inner thigh 0.2 x 0.2, right groin wound 10.5 x 2.0, Left groin wound 0.6x1.8.0.1cm  Mild discomfort on pressure over Rt inguinal ligament, but no swelling or erythema  MMT--Able to contract B/L upper back muscles, EF/EE 4/5 distally, knee Est 3/5      RECENT LABS/IMAGING---[emdomg as LEFT ARM MED LINE IS NON FUNCTIONAL       MEDICATIONS  (STANDING):  apixaban 5 milliGRAM(s) Oral every 12 hours  artificial  tears Solution 1 Drop(s) Both EYES every 12 hours  ascorbic acid 500 milliGRAM(s) Oral daily  atovaquone  Suspension 1500 milliGRAM(s) Oral daily  dextrose 5%. 1000 milliLiter(s) (100 mL/Hr) IV Continuous <Continuous>  dextrose 5%. 1000 milliLiter(s) (50 mL/Hr) IV Continuous <Continuous>  dextrose 50% Injectable 12.5 Gram(s) IV Push once  dextrose 50% Injectable 25 Gram(s) IV Push once  dextrose 50% Injectable 25 Gram(s) IV Push once  folic acid 1 milliGRAM(s) Oral daily  gabapentin 300 milliGRAM(s) Oral three times a day  glucagon  Injectable 1 milliGRAM(s) IntraMuscular once  lactobacillus acidophilus 1 Tablet(s) Oral two times a day with meals  melatonin 6 milliGRAM(s) Oral at bedtime  methylPREDNISolone 36 milliGRAM(s) Oral daily  metoprolol tartrate 12.5 milliGRAM(s) Oral two times a day  multivitamin 1 Tablet(s) Oral daily  nystatin Powder 1 Application(s) Topical two times a day  pantoprazole    Tablet 40 milliGRAM(s) Oral before breakfast  sodium chloride 0.9% lock flush 5 milliLiter(s) IV Push two times a day  zinc oxide 40% Paste 1 Application(s) Topical three times a day    MEDICATIONS  (PRN):  albuterol/ipratropium for Nebulization 3 milliLiter(s) Nebulizer every 6 hours PRN Shortness of Breath and/or Wheezing  ALPRAZolam 0.25 milliGRAM(s) Oral every 6 hours PRN Anxiety  aluminum hydroxide/magnesium hydroxide/simethicone Suspension 30 milliLiter(s) Oral every 4 hours PRN Dyspepsia  ammonium lactate 12% Lotion 1 Application(s) Topical every 12 hours PRN BL feet dryness  benzocaine/menthol Lozenge 1 Lozenge Oral two times a day PRN Sore Throat  benzonatate 100 milliGRAM(s) Oral three times a day PRN Cough  bisacodyl 5 milliGRAM(s) Oral <User Schedule> PRN Constipation  dextrose Oral Gel 15 Gram(s) Oral once PRN Blood Glucose LESS THAN 70 milliGRAM(s)/deciliter  hydrocortisone hemorrhoidal Suppository 1 Suppository(s) Rectal two times a day PRN Hemorrhoids  loperamide 2 milliGRAM(s) Oral three times a day PRN Diarrhea  polyethylene glycol 3350 17 Gram(s) Oral daily PRN Constipation  psyllium Powder 1 Packet(s) Oral daily PRN Constipation  SIMETHICONE SOFTGELS 250 milliGRAM(s),SIMETHICONE SOFTGELS 250 milliGRAM(s) 250 milliGRAM(s) Oral three times a day PRN for gas  sodium chloride 0.65% Nasal 1 Spray(s) Both Nostrils every 8 hours PRN Nasal Congestion                  SUBJECTIVE/ROS:  Patient seen and examined at bedside   Reports normal BM yesterday post suppository  Reports some discomfort Rt perineal area, feels she may have pulled a muscle during therapy, but not acute pain or redness  Tolerating oral diet  Tingling/numbness of fingers, non progressive over fingers/toes and over small area on Rt lower shin, and left post scalp  Tolerating low dose gabapentin     RN reports that left arm medline is non functional  Hence labs not yet drawn  Patient has difficult access     ROS--No chest or abd pain, no palpitations nausea or vomiting  Tingling/numbness as noted  LBM 11/26    Therapy.  Motor strength at both ankles and knee continue to improve  Continues to ambulate functional distance with manual WC, without assistance   slide board transfer bed to   Sustained improvement of upper extremity function and strength    Vital Signs Last 24 Hrs  T(C): 36.5 (27 Nov 2023 08:20), Max: 37.2 (26 Nov 2023 22:38)  T(F): 97.7 (27 Nov 2023 08:20), Max: 98.9 (26 Nov 2023 22:38)  HR: 77 (27 Nov 2023 08:20) (77 - 88)  BP: 122/73 (27 Nov 2023 08:20) (109/66 - 122/73)  RR: 16 (27 Nov 2023 08:20) (16 - 16)  SpO2: 98% (27 Nov 2023 08:20) (90% - 98%)  O2 Parameters below as of 27 Nov 2023 08:20  Patient On (Oxygen Delivery Method): nasal cannula  O2 Flow (L/min): 1        PHYSICAL EXAM:  Gen -  Comfortable,  at bedside -normal oxy sat and subsequently self ambulating   Pulm - clear  Cardiovascular - HS i and II intermittently irregular  Abdomen - Soft, non tender,   Extremities - edema to b/l LE, no calf tenderness, LUE Med line    Neuro-  Cognitive - awake, alert, fully oriented,  Light tough sensation diffusely reduced on fingers and dorsal foot, and over right lower jaw, localized area approx 2 cm, no skin changes noted, non progressive   Motor -                    LEFT    UE - 4/5                    RIGHT UE - 4/5                     LEFT    LE - 3+/5, distally and 3/5 proximal                    RIGHT LE - Ankles PF 3/5 ,DF 2/5, Knee ext 3/5 Hips limited by pain Rt hip due to ulcer at ECHO access site   Sensory - decreased light tough sensation fingers and sole of foot, difusely  MSK: improvement with motor strength  Psychiatric - Mood stable, Affect WNL  Skin -- , stage 2 pressure injury to right buttock 4 x1 and left buttock 3x1, stage 2 pressure injury ti right inner thigh 0.2 x 0.2, right groin wound 10.5 x 2.0, Left groin wound 0.6x1.8.0.1cm  Mild discomfort on pressure over Rt inguinal ligament, but no swelling or erythema  MMT--Able to contract B/L upper back muscles, EF/EE 4/5 distally, knee Est 3/5      RECENT LABS/IMAGING---[emdomg as LEFT ARM MED LINE IS NON FUNCTIONAL       MEDICATIONS  (STANDING):  apixaban 5 milliGRAM(s) Oral every 12 hours  artificial  tears Solution 1 Drop(s) Both EYES every 12 hours  ascorbic acid 500 milliGRAM(s) Oral daily  atovaquone  Suspension 1500 milliGRAM(s) Oral daily  dextrose 5%. 1000 milliLiter(s) (100 mL/Hr) IV Continuous <Continuous>  dextrose 5%. 1000 milliLiter(s) (50 mL/Hr) IV Continuous <Continuous>  dextrose 50% Injectable 12.5 Gram(s) IV Push once  dextrose 50% Injectable 25 Gram(s) IV Push once  dextrose 50% Injectable 25 Gram(s) IV Push once  folic acid 1 milliGRAM(s) Oral daily  gabapentin 300 milliGRAM(s) Oral three times a day  glucagon  Injectable 1 milliGRAM(s) IntraMuscular once  lactobacillus acidophilus 1 Tablet(s) Oral two times a day with meals  melatonin 6 milliGRAM(s) Oral at bedtime  methylPREDNISolone 36 milliGRAM(s) Oral daily  metoprolol tartrate 12.5 milliGRAM(s) Oral two times a day  multivitamin 1 Tablet(s) Oral daily  nystatin Powder 1 Application(s) Topical two times a day  pantoprazole    Tablet 40 milliGRAM(s) Oral before breakfast  sodium chloride 0.9% lock flush 5 milliLiter(s) IV Push two times a day  zinc oxide 40% Paste 1 Application(s) Topical three times a day    MEDICATIONS  (PRN):  albuterol/ipratropium for Nebulization 3 milliLiter(s) Nebulizer every 6 hours PRN Shortness of Breath and/or Wheezing  ALPRAZolam 0.25 milliGRAM(s) Oral every 6 hours PRN Anxiety  aluminum hydroxide/magnesium hydroxide/simethicone Suspension 30 milliLiter(s) Oral every 4 hours PRN Dyspepsia  ammonium lactate 12% Lotion 1 Application(s) Topical every 12 hours PRN BL feet dryness  benzocaine/menthol Lozenge 1 Lozenge Oral two times a day PRN Sore Throat  benzonatate 100 milliGRAM(s) Oral three times a day PRN Cough  bisacodyl 5 milliGRAM(s) Oral <User Schedule> PRN Constipation  dextrose Oral Gel 15 Gram(s) Oral once PRN Blood Glucose LESS THAN 70 milliGRAM(s)/deciliter  hydrocortisone hemorrhoidal Suppository 1 Suppository(s) Rectal two times a day PRN Hemorrhoids  loperamide 2 milliGRAM(s) Oral three times a day PRN Diarrhea  polyethylene glycol 3350 17 Gram(s) Oral daily PRN Constipation  psyllium Powder 1 Packet(s) Oral daily PRN Constipation  SIMETHICONE SOFTGELS 250 milliGRAM(s),SIMETHICONE SOFTGELS 250 milliGRAM(s) 250 milliGRAM(s) Oral three times a day PRN for gas  sodium chloride 0.65% Nasal 1 Spray(s) Both Nostrils every 8 hours PRN Nasal Congestion                  SUBJECTIVE/ROS:  Patient seen and examined at bedside   Reports normal BM yesterday post suppository  Reports some discomfort Rt perineal area, feels she may have pulled a muscle during therapy, but not acute pain or redness  Tolerating oral diet  Tingling/numbness of fingers, non progressive over fingers/toes and over small area on Rt lower shin, and left post scalp  Tolerating low dose gabapentin     RN reports that left arm medline is non functional  Hence labs not yet drawn  Patient has difficult access     ROS--No chest or abd pain, no palpitations nausea or vomiting  Tingling/numbness as noted  LB 11/26    Therapy.  Motor strength at both ankles and knee continue to improve  Continues to ambulate functional distance with manual WC, without assistance  Sustained improvement with slide board transfer bed to   Sustained improvement of upper extremity function and strength  Observed in therapy, ambulated with walker up to approx 5 ft    Vital Signs Last 24 Hrs  T(C): 36.5 (27 Nov 2023 08:20), Max: 37.2 (26 Nov 2023 22:38)  T(F): 97.7 (27 Nov 2023 08:20), Max: 98.9 (26 Nov 2023 22:38)  HR: 77 (27 Nov 2023 08:20) (77 - 88)  BP: 122/73 (27 Nov 2023 08:20) (109/66 - 122/73)  RR: 16 (27 Nov 2023 08:20) (16 - 16)  SpO2: 98% (27 Nov 2023 08:20) (90% - 98%)  O2 Parameters below as of 27 Nov 2023 08:20  Patient On (Oxygen Delivery Method): nasal cannula  O2 Flow (L/min): 1        PHYSICAL EXAM:  Gen -  Comfortable,  at bedside -normal oxy sat and subsequently self ambulating   Pulm - clear  Cardiovascular - HS i and II intermittently irregular  Abdomen - Soft, non tender,   Extremities - edema to b/l LE, no calf tenderness, LUE Med line    Neuro-  Cognitive - awake, alert, fully oriented,  Light tough sensation diffusely reduced on fingers and dorsal foot, and over right lower jaw, localized area approx 2 cm, no skin changes noted, non progressive   Motor -                    LEFT    UE - 4/5                    RIGHT UE - 4/5                     LEFT    LE - 3+/5, distally and 3/5 proximal                    RIGHT LE - Ankles PF 3/5 ,DF 2/5, Knee ext 3/5 Hips limited by pain Rt hip due to ulcer at ECHO access site   Sensory - decreased light tough sensation fingers and sole of foot, difusely  MSK: improvement with motor strength  Psychiatric - Mood stable, Affect WNL  Skin -- , stage 2 pressure injury to right buttock 4 x1 and left buttock 3x1, stage 2 pressure injury ti right inner thigh 0.2 x 0.2, right groin wound 10.5 x 2.0, Left groin wound 0.6x1.8.0.1cm  Mild discomfort on pressure over Rt inguinal ligament, but no swelling or erythema  MMT--Able to contract B/L upper back muscles, EF/EE 4/5 distally, knee Est 3/5      RECENT LABS/IMAGING---[emdomg as LEFT ARM MED LINE IS NON FUNCTIONAL       MEDICATIONS  (STANDING):  apixaban 5 milliGRAM(s) Oral every 12 hours  artificial  tears Solution 1 Drop(s) Both EYES every 12 hours  ascorbic acid 500 milliGRAM(s) Oral daily  atovaquone  Suspension 1500 milliGRAM(s) Oral daily  dextrose 5%. 1000 milliLiter(s) (100 mL/Hr) IV Continuous <Continuous>  dextrose 5%. 1000 milliLiter(s) (50 mL/Hr) IV Continuous <Continuous>  dextrose 50% Injectable 12.5 Gram(s) IV Push once  dextrose 50% Injectable 25 Gram(s) IV Push once  dextrose 50% Injectable 25 Gram(s) IV Push once  folic acid 1 milliGRAM(s) Oral daily  gabapentin 300 milliGRAM(s) Oral three times a day  glucagon  Injectable 1 milliGRAM(s) IntraMuscular once  lactobacillus acidophilus 1 Tablet(s) Oral two times a day with meals  melatonin 6 milliGRAM(s) Oral at bedtime  methylPREDNISolone 36 milliGRAM(s) Oral daily  metoprolol tartrate 12.5 milliGRAM(s) Oral two times a day  multivitamin 1 Tablet(s) Oral daily  nystatin Powder 1 Application(s) Topical two times a day  pantoprazole    Tablet 40 milliGRAM(s) Oral before breakfast  sodium chloride 0.9% lock flush 5 milliLiter(s) IV Push two times a day  zinc oxide 40% Paste 1 Application(s) Topical three times a day    MEDICATIONS  (PRN):  albuterol/ipratropium for Nebulization 3 milliLiter(s) Nebulizer every 6 hours PRN Shortness of Breath and/or Wheezing  ALPRAZolam 0.25 milliGRAM(s) Oral every 6 hours PRN Anxiety  aluminum hydroxide/magnesium hydroxide/simethicone Suspension 30 milliLiter(s) Oral every 4 hours PRN Dyspepsia  ammonium lactate 12% Lotion 1 Application(s) Topical every 12 hours PRN BL feet dryness  benzocaine/menthol Lozenge 1 Lozenge Oral two times a day PRN Sore Throat  benzonatate 100 milliGRAM(s) Oral three times a day PRN Cough  bisacodyl 5 milliGRAM(s) Oral <User Schedule> PRN Constipation  dextrose Oral Gel 15 Gram(s) Oral once PRN Blood Glucose LESS THAN 70 milliGRAM(s)/deciliter  hydrocortisone hemorrhoidal Suppository 1 Suppository(s) Rectal two times a day PRN Hemorrhoids  loperamide 2 milliGRAM(s) Oral three times a day PRN Diarrhea  polyethylene glycol 3350 17 Gram(s) Oral daily PRN Constipation  psyllium Powder 1 Packet(s) Oral daily PRN Constipation  SIMETHICONE SOFTGELS 250 milliGRAM(s),SIMETHICONE SOFTGELS 250 milliGRAM(s) 250 milliGRAM(s) Oral three times a day PRN for gas  sodium chloride 0.65% Nasal 1 Spray(s) Both Nostrils every 8 hours PRN Nasal Congestion

## 2023-11-28 LAB
ALBUMIN SERPL ELPH-MCNC: 3.1 G/DL — LOW (ref 3.3–5)
ALBUMIN SERPL ELPH-MCNC: 3.1 G/DL — LOW (ref 3.3–5)
ALP SERPL-CCNC: 227 U/L — HIGH (ref 40–120)
ALP SERPL-CCNC: 227 U/L — HIGH (ref 40–120)
ALT FLD-CCNC: 93 U/L — HIGH (ref 10–45)
ALT FLD-CCNC: 93 U/L — HIGH (ref 10–45)
ANION GAP SERPL CALC-SCNC: 9 MMOL/L — SIGNIFICANT CHANGE UP (ref 5–17)
ANION GAP SERPL CALC-SCNC: 9 MMOL/L — SIGNIFICANT CHANGE UP (ref 5–17)
AST SERPL-CCNC: 57 U/L — HIGH (ref 10–40)
AST SERPL-CCNC: 57 U/L — HIGH (ref 10–40)
BASOPHILS # BLD AUTO: 0.02 K/UL — SIGNIFICANT CHANGE UP (ref 0–0.2)
BASOPHILS # BLD AUTO: 0.02 K/UL — SIGNIFICANT CHANGE UP (ref 0–0.2)
BASOPHILS NFR BLD AUTO: 0.1 % — SIGNIFICANT CHANGE UP (ref 0–2)
BASOPHILS NFR BLD AUTO: 0.1 % — SIGNIFICANT CHANGE UP (ref 0–2)
BILIRUB SERPL-MCNC: 0.8 MG/DL — SIGNIFICANT CHANGE UP (ref 0.2–1.2)
BILIRUB SERPL-MCNC: 0.8 MG/DL — SIGNIFICANT CHANGE UP (ref 0.2–1.2)
BUN SERPL-MCNC: 25 MG/DL — HIGH (ref 7–23)
BUN SERPL-MCNC: 25 MG/DL — HIGH (ref 7–23)
CALCIUM SERPL-MCNC: 9.2 MG/DL — SIGNIFICANT CHANGE UP (ref 8.4–10.5)
CALCIUM SERPL-MCNC: 9.2 MG/DL — SIGNIFICANT CHANGE UP (ref 8.4–10.5)
CHLORIDE SERPL-SCNC: 102 MMOL/L — SIGNIFICANT CHANGE UP (ref 96–108)
CHLORIDE SERPL-SCNC: 102 MMOL/L — SIGNIFICANT CHANGE UP (ref 96–108)
CO2 SERPL-SCNC: 26 MMOL/L — SIGNIFICANT CHANGE UP (ref 22–31)
CO2 SERPL-SCNC: 26 MMOL/L — SIGNIFICANT CHANGE UP (ref 22–31)
CREAT SERPL-MCNC: 0.66 MG/DL — SIGNIFICANT CHANGE UP (ref 0.5–1.3)
CREAT SERPL-MCNC: 0.66 MG/DL — SIGNIFICANT CHANGE UP (ref 0.5–1.3)
EGFR: 98 ML/MIN/1.73M2 — SIGNIFICANT CHANGE UP
EGFR: 98 ML/MIN/1.73M2 — SIGNIFICANT CHANGE UP
EOSINOPHIL # BLD AUTO: 0 K/UL — SIGNIFICANT CHANGE UP (ref 0–0.5)
EOSINOPHIL # BLD AUTO: 0 K/UL — SIGNIFICANT CHANGE UP (ref 0–0.5)
EOSINOPHIL NFR BLD AUTO: 0 % — SIGNIFICANT CHANGE UP (ref 0–6)
EOSINOPHIL NFR BLD AUTO: 0 % — SIGNIFICANT CHANGE UP (ref 0–6)
GLUCOSE SERPL-MCNC: 205 MG/DL — HIGH (ref 70–99)
GLUCOSE SERPL-MCNC: 205 MG/DL — HIGH (ref 70–99)
HCT VFR BLD CALC: 35.3 % — SIGNIFICANT CHANGE UP (ref 34.5–45)
HCT VFR BLD CALC: 35.3 % — SIGNIFICANT CHANGE UP (ref 34.5–45)
HGB BLD-MCNC: 11 G/DL — LOW (ref 11.5–15.5)
HGB BLD-MCNC: 11 G/DL — LOW (ref 11.5–15.5)
IMM GRANULOCYTES NFR BLD AUTO: 0.8 % — SIGNIFICANT CHANGE UP (ref 0–0.9)
IMM GRANULOCYTES NFR BLD AUTO: 0.8 % — SIGNIFICANT CHANGE UP (ref 0–0.9)
LYMPHOCYTES # BLD AUTO: 0.14 K/UL — LOW (ref 1–3.3)
LYMPHOCYTES # BLD AUTO: 0.14 K/UL — LOW (ref 1–3.3)
LYMPHOCYTES # BLD AUTO: 1 % — LOW (ref 13–44)
LYMPHOCYTES # BLD AUTO: 1 % — LOW (ref 13–44)
MCHC RBC-ENTMCNC: 27.8 PG — SIGNIFICANT CHANGE UP (ref 27–34)
MCHC RBC-ENTMCNC: 27.8 PG — SIGNIFICANT CHANGE UP (ref 27–34)
MCHC RBC-ENTMCNC: 31.2 GM/DL — LOW (ref 32–36)
MCHC RBC-ENTMCNC: 31.2 GM/DL — LOW (ref 32–36)
MCV RBC AUTO: 89.1 FL — SIGNIFICANT CHANGE UP (ref 80–100)
MCV RBC AUTO: 89.1 FL — SIGNIFICANT CHANGE UP (ref 80–100)
MONOCYTES # BLD AUTO: 0.27 K/UL — SIGNIFICANT CHANGE UP (ref 0–0.9)
MONOCYTES # BLD AUTO: 0.27 K/UL — SIGNIFICANT CHANGE UP (ref 0–0.9)
MONOCYTES NFR BLD AUTO: 1.8 % — LOW (ref 2–14)
MONOCYTES NFR BLD AUTO: 1.8 % — LOW (ref 2–14)
NEUTROPHILS # BLD AUTO: 14.06 K/UL — HIGH (ref 1.8–7.4)
NEUTROPHILS # BLD AUTO: 14.06 K/UL — HIGH (ref 1.8–7.4)
NEUTROPHILS NFR BLD AUTO: 96.3 % — HIGH (ref 43–77)
NEUTROPHILS NFR BLD AUTO: 96.3 % — HIGH (ref 43–77)
NRBC # BLD: 0 /100 WBCS — SIGNIFICANT CHANGE UP (ref 0–0)
NRBC # BLD: 0 /100 WBCS — SIGNIFICANT CHANGE UP (ref 0–0)
PLATELET # BLD AUTO: 315 K/UL — SIGNIFICANT CHANGE UP (ref 150–400)
PLATELET # BLD AUTO: 315 K/UL — SIGNIFICANT CHANGE UP (ref 150–400)
POTASSIUM SERPL-MCNC: 4.2 MMOL/L — SIGNIFICANT CHANGE UP (ref 3.5–5.3)
POTASSIUM SERPL-MCNC: 4.2 MMOL/L — SIGNIFICANT CHANGE UP (ref 3.5–5.3)
POTASSIUM SERPL-SCNC: 4.2 MMOL/L — SIGNIFICANT CHANGE UP (ref 3.5–5.3)
POTASSIUM SERPL-SCNC: 4.2 MMOL/L — SIGNIFICANT CHANGE UP (ref 3.5–5.3)
PROT SERPL-MCNC: 6 G/DL — SIGNIFICANT CHANGE UP (ref 6–8.3)
PROT SERPL-MCNC: 6 G/DL — SIGNIFICANT CHANGE UP (ref 6–8.3)
RBC # BLD: 3.96 M/UL — SIGNIFICANT CHANGE UP (ref 3.8–5.2)
RBC # BLD: 3.96 M/UL — SIGNIFICANT CHANGE UP (ref 3.8–5.2)
RBC # FLD: 15.4 % — HIGH (ref 10.3–14.5)
RBC # FLD: 15.4 % — HIGH (ref 10.3–14.5)
SODIUM SERPL-SCNC: 137 MMOL/L — SIGNIFICANT CHANGE UP (ref 135–145)
SODIUM SERPL-SCNC: 137 MMOL/L — SIGNIFICANT CHANGE UP (ref 135–145)
WBC # BLD: 14.6 K/UL — HIGH (ref 3.8–10.5)
WBC # BLD: 14.6 K/UL — HIGH (ref 3.8–10.5)
WBC # FLD AUTO: 14.6 K/UL — HIGH (ref 3.8–10.5)
WBC # FLD AUTO: 14.6 K/UL — HIGH (ref 3.8–10.5)

## 2023-11-28 PROCEDURE — 99233 SBSQ HOSP IP/OBS HIGH 50: CPT

## 2023-11-28 PROCEDURE — 99232 SBSQ HOSP IP/OBS MODERATE 35: CPT

## 2023-11-28 RX ORDER — ALPRAZOLAM 0.25 MG
0.25 TABLET ORAL EVERY 6 HOURS
Refills: 0 | Status: DISCONTINUED | OUTPATIENT
Start: 2023-11-28 | End: 2023-12-04

## 2023-11-28 RX ADMIN — PANTOPRAZOLE SODIUM 40 MILLIGRAM(S): 20 TABLET, DELAYED RELEASE ORAL at 09:15

## 2023-11-28 RX ADMIN — NYSTATIN CREAM 1 APPLICATION(S): 100000 CREAM TOPICAL at 09:03

## 2023-11-28 RX ADMIN — Medication 1 TABLET(S): at 09:13

## 2023-11-28 RX ADMIN — ATOVAQUONE 1500 MILLIGRAM(S): 750 SUSPENSION ORAL at 21:03

## 2023-11-28 RX ADMIN — Medication 36 MILLIGRAM(S): at 09:11

## 2023-11-28 RX ADMIN — Medication 12.5 MILLIGRAM(S): at 21:04

## 2023-11-28 RX ADMIN — GABAPENTIN 300 MILLIGRAM(S): 400 CAPSULE ORAL at 09:11

## 2023-11-28 RX ADMIN — Medication 500 MILLIGRAM(S): at 21:04

## 2023-11-28 RX ADMIN — GABAPENTIN 300 MILLIGRAM(S): 400 CAPSULE ORAL at 15:09

## 2023-11-28 RX ADMIN — GABAPENTIN 300 MILLIGRAM(S): 400 CAPSULE ORAL at 21:03

## 2023-11-28 RX ADMIN — APIXABAN 5 MILLIGRAM(S): 2.5 TABLET, FILM COATED ORAL at 21:03

## 2023-11-28 RX ADMIN — Medication 6 MILLIGRAM(S): at 21:02

## 2023-11-28 RX ADMIN — Medication 1 TABLET(S): at 21:03

## 2023-11-28 RX ADMIN — APIXABAN 5 MILLIGRAM(S): 2.5 TABLET, FILM COATED ORAL at 09:14

## 2023-11-28 RX ADMIN — ZINC OXIDE 1 APPLICATION(S): 200 OINTMENT TOPICAL at 15:25

## 2023-11-28 RX ADMIN — ZINC OXIDE 1 APPLICATION(S): 200 OINTMENT TOPICAL at 20:57

## 2023-11-28 RX ADMIN — Medication 1 MILLIGRAM(S): at 21:04

## 2023-11-28 RX ADMIN — ZINC OXIDE 1 APPLICATION(S): 200 OINTMENT TOPICAL at 09:03

## 2023-11-28 RX ADMIN — SODIUM CHLORIDE 5 MILLILITER(S): 9 INJECTION INTRAMUSCULAR; INTRAVENOUS; SUBCUTANEOUS at 09:03

## 2023-11-28 RX ADMIN — Medication 1 TABLET(S): at 21:04

## 2023-11-28 RX ADMIN — Medication 12.5 MILLIGRAM(S): at 09:14

## 2023-11-28 RX ADMIN — Medication 1 DROP(S): at 09:17

## 2023-11-28 NOTE — PROGRESS NOTE ADULT - SUBJECTIVE AND OBJECTIVE BOX
Patient is a 62y old  Female who presents with a chief complaint of Interstitial Lung Disease (28 Nov 2023 11:20)      Subjective and overnight events:  Patient seen and examined at bedside. reports shoulder pain from exercises.  no fever, chills, sob, cp, abd pain. + BM    ALLERGIES:  No Known Allergies    MEDICATIONS  (STANDING):  apixaban 5 milliGRAM(s) Oral every 12 hours  artificial  tears Solution 1 Drop(s) Both EYES every 12 hours  ascorbic acid 500 milliGRAM(s) Oral daily  atovaquone  Suspension 1500 milliGRAM(s) Oral daily  dextrose 5%. 1000 milliLiter(s) (100 mL/Hr) IV Continuous <Continuous>  dextrose 5%. 1000 milliLiter(s) (50 mL/Hr) IV Continuous <Continuous>  dextrose 50% Injectable 12.5 Gram(s) IV Push once  dextrose 50% Injectable 25 Gram(s) IV Push once  dextrose 50% Injectable 25 Gram(s) IV Push once  folic acid 1 milliGRAM(s) Oral daily  gabapentin 300 milliGRAM(s) Oral three times a day  glucagon  Injectable 1 milliGRAM(s) IntraMuscular once  lactobacillus acidophilus 1 Tablet(s) Oral two times a day with meals  melatonin 6 milliGRAM(s) Oral at bedtime  methylPREDNISolone 36 milliGRAM(s) Oral daily  metoprolol tartrate 12.5 milliGRAM(s) Oral two times a day  multivitamin 1 Tablet(s) Oral daily  nystatin Powder 1 Application(s) Topical two times a day  pantoprazole    Tablet 40 milliGRAM(s) Oral before breakfast  sodium chloride 0.9% lock flush 5 milliLiter(s) IV Push two times a day  zinc oxide 40% Paste 1 Application(s) Topical three times a day    MEDICATIONS  (PRN):  albuterol/ipratropium for Nebulization 3 milliLiter(s) Nebulizer every 6 hours PRN Shortness of Breath and/or Wheezing  ALPRAZolam 0.25 milliGRAM(s) Oral every 6 hours PRN Anxiety  aluminum hydroxide/magnesium hydroxide/simethicone Suspension 30 milliLiter(s) Oral every 4 hours PRN Dyspepsia  ammonium lactate 12% Lotion 1 Application(s) Topical every 12 hours PRN BL feet dryness  benzocaine/menthol Lozenge 1 Lozenge Oral two times a day PRN Sore Throat  benzonatate 100 milliGRAM(s) Oral three times a day PRN Cough  bisacodyl 5 milliGRAM(s) Oral <User Schedule> PRN Constipation  dextrose Oral Gel 15 Gram(s) Oral once PRN Blood Glucose LESS THAN 70 milliGRAM(s)/deciliter  hydrocortisone hemorrhoidal Suppository 1 Suppository(s) Rectal two times a day PRN Hemorrhoids  loperamide 2 milliGRAM(s) Oral three times a day PRN Diarrhea  polyethylene glycol 3350 17 Gram(s) Oral daily PRN Constipation  psyllium Powder 1 Packet(s) Oral daily PRN Constipation  SIMETHICONE SOFTGELS 250 milliGRAM(s),SIMETHICONE SOFTGELS 250 milliGRAM(s) 250 milliGRAM(s) Oral three times a day PRN for gas  sodium chloride 0.65% Nasal 1 Spray(s) Both Nostrils every 8 hours PRN Nasal Congestion    Vital Signs Last 24 Hrs  T(F): 97.6 (28 Nov 2023 09:01), Max: 97.6 (27 Nov 2023 20:00)  HR: 80 (28 Nov 2023 09:01) (80 - 84)  BP: 104/68 (28 Nov 2023 09:01) (104/68 - 109/70)  RR: 16 (28 Nov 2023 09:01) (16 - 16)  SpO2: 93% (28 Nov 2023 09:01) (93% - 94%)  I&O's Summary    PHYSICAL EXAM:  General: NAD, A/O x 3  ENT: MMM  Neck: Supple, No JVD  Lungs: Clear to auscultation bilaterally  Cardio: RRR, S1/S2, No murmurs  Abdomen: Soft, Nontender, Nondistended; Bowel sounds present  Extremities: No calf tenderness, No pitting edema    LABS:        09-25 Chol -- LDL -- HDL -- Trig 199 mg/dL            RADIOLOGY & ADDITIONAL TESTS:    Care Discussed with Consultants/Other Providers:

## 2023-11-28 NOTE — CHART NOTE - NSCHARTNOTEFT_GEN_A_CORE
IDT conference on 11/28   Social Work: May consider private hire.   SLP: --  OT: Setup for eating/grooming/UBD. Min A for LBD. Toilet transfer with mod A x2 (Lori anthony). Standing balance improving to assist of 1 person.   PT: SB transfers with min A. Sit to stand transfer with Mod A x2 people. Amb 15ft with RW. Mod I with WC management.  RT: Yet to participate.   DME: WC, RW, drop arm commode, SB   Barriers: Easily fatigued, poor endurance.  Functional level on DC: Supervision for SB transfers. Toileting/bathing for mod A.   TDD: 12/6 to home

## 2023-11-28 NOTE — PROGRESS NOTE ADULT - NS ATTEND AMEND GEN_ALL_CORE FT
Seen and examined, findings noted    No interval med complaint    Clinically stable  Sustained improvement in therapy, increased ambulatory distance with reducing level of assistance  Improvement with slide board transfers    Continue therapy  Discuss another attempt by phlebotomy for blood draw from peripheral vessel

## 2023-11-28 NOTE — PROGRESS NOTE ADULT - SUBJECTIVE AND OBJECTIVE BOX
SUBJECTIVE/ROS:  Patient seen and examined at bedside this AM with Dr. JACOB  Admits to sleeping well.  Discussed plan for midline replacement.     RN reports that left arm medline is non functional  Hence labs not yet drawn  Patient has difficult access     ROS--No chest or abd pain, no palpitations nausea or vomiting  Tingling/numbness as noted  LB 11/27    Therapy.  Motor strength at both ankles and knee continue to improve  Continues to ambulate functional distance with manual WC, without assistance  Sustained improvement with slide board transfer bed to   Sustained improvement of upper extremity function and strength  Observed in therapy, ambulated with walker up to approx 5 ft      Vital Signs Last 24 Hrs  T(C): 36.4 (28 Nov 2023 09:01), Max: 36.4 (27 Nov 2023 20:00)  T(F): 97.6 (28 Nov 2023 09:01), Max: 97.6 (27 Nov 2023 20:00)  HR: 80 (28 Nov 2023 09:01) (80 - 84)  BP: 104/68 (28 Nov 2023 09:01) (104/68 - 109/70)  BP(mean): --  RR: 16 (28 Nov 2023 09:01) (16 - 16)  SpO2: 93% (28 Nov 2023 09:01) (93% - 94%)        PHYSICAL EXAM:  Gen -  Comfortable,  at bedside -normal oxy sat and subsequently self ambulating   Pulm - clear  Cardiovascular - HS i and II intermittently irregular  Abdomen - Soft, non tender,   Extremities - edema to b/l LE, no calf tenderness, LUE Med line    Neuro-  Cognitive - awake, alert, fully oriented,  Light tough sensation diffusely reduced on fingers and dorsal foot, and over right lower jaw, localized area approx 2 cm, no skin changes noted, non progressive   Motor -                    LEFT    UE - 4/5                    RIGHT UE - 4/5                     LEFT    LE - 3+/5, distally and 3/5 proximal                    RIGHT LE - Ankles PF 3/5 ,DF 2/5, Knee ext 3/5 Hips limited by pain Rt hip due to ulcer at ECHO access site   Sensory - decreased light tough sensation fingers and sole of foot, difusely  MSK: improvement with motor strength  Psychiatric - Mood stable, Affect WNL  Skin -- , stage 2 pressure injury to right buttock 4 x1 and left buttock 3x1, stage 2 pressure injury ti right inner thigh 0.2 x 0.2, right groin wound 10.5 x 2.0, Left groin wound 0.6x1.8.0.1cm  Mild discomfort on pressure over Rt inguinal ligament, but no swelling or erythema  MMT--Able to contract B/L upper back muscles, EF/EE 4/5 distally, knee Est 3/5      RECENT LABS/IMAGING---[emdomg as LEFT ARM MED LINE IS NON FUNCTIONAL       MEDICATIONS  (STANDING):  apixaban 5 milliGRAM(s) Oral every 12 hours  artificial  tears Solution 1 Drop(s) Both EYES every 12 hours  ascorbic acid 500 milliGRAM(s) Oral daily  atovaquone  Suspension 1500 milliGRAM(s) Oral daily  dextrose 5%. 1000 milliLiter(s) (100 mL/Hr) IV Continuous <Continuous>  dextrose 5%. 1000 milliLiter(s) (50 mL/Hr) IV Continuous <Continuous>  dextrose 50% Injectable 12.5 Gram(s) IV Push once  dextrose 50% Injectable 25 Gram(s) IV Push once  dextrose 50% Injectable 25 Gram(s) IV Push once  folic acid 1 milliGRAM(s) Oral daily  gabapentin 300 milliGRAM(s) Oral three times a day  glucagon  Injectable 1 milliGRAM(s) IntraMuscular once  lactobacillus acidophilus 1 Tablet(s) Oral two times a day with meals  melatonin 6 milliGRAM(s) Oral at bedtime  methylPREDNISolone 36 milliGRAM(s) Oral daily  metoprolol tartrate 12.5 milliGRAM(s) Oral two times a day  multivitamin 1 Tablet(s) Oral daily  nystatin Powder 1 Application(s) Topical two times a day  pantoprazole    Tablet 40 milliGRAM(s) Oral before breakfast  sodium chloride 0.9% lock flush 5 milliLiter(s) IV Push two times a day  zinc oxide 40% Paste 1 Application(s) Topical three times a day    MEDICATIONS  (PRN):  albuterol/ipratropium for Nebulization 3 milliLiter(s) Nebulizer every 6 hours PRN Shortness of Breath and/or Wheezing  ALPRAZolam 0.25 milliGRAM(s) Oral every 6 hours PRN Anxiety  aluminum hydroxide/magnesium hydroxide/simethicone Suspension 30 milliLiter(s) Oral every 4 hours PRN Dyspepsia  ammonium lactate 12% Lotion 1 Application(s) Topical every 12 hours PRN BL feet dryness  benzocaine/menthol Lozenge 1 Lozenge Oral two times a day PRN Sore Throat  benzonatate 100 milliGRAM(s) Oral three times a day PRN Cough  bisacodyl 5 milliGRAM(s) Oral <User Schedule> PRN Constipation  dextrose Oral Gel 15 Gram(s) Oral once PRN Blood Glucose LESS THAN 70 milliGRAM(s)/deciliter  hydrocortisone hemorrhoidal Suppository 1 Suppository(s) Rectal two times a day PRN Hemorrhoids  loperamide 2 milliGRAM(s) Oral three times a day PRN Diarrhea  polyethylene glycol 3350 17 Gram(s) Oral daily PRN Constipation  psyllium Powder 1 Packet(s) Oral daily PRN Constipation  SIMETHICONE SOFTGELS 250 milliGRAM(s),SIMETHICONE SOFTGELS 250 milliGRAM(s) 250 milliGRAM(s) Oral three times a day PRN for gas  sodium chloride 0.65% Nasal 1 Spray(s) Both Nostrils every 8 hours PRN Nasal Congestion    SUBJECTIVE/ROS:  Patient seen and examined at bedside this AM with Dr. López  Admits to sleeping well.  Discussed plan for midline replacement.     RN reports that left arm medline is non functional  Hence labs not yet drawn  Patient has difficult access     ROS--No chest or abd pain, no palpitations nausea or vomiting  Tingling/numbness as noted  LBM 11/27    Therapy.  Observed today during therapy  Slide board transfer bed to , mod assist x 2 persons  Ambulating 15 ft with walker  Required 2 person assist Sit to stand, but once stanging, was able to advance walker and ambulate CGA up to 15 ft (improvement from 5 ft yesterday)  Continues to ambulate functional distance with manual WC, without assistance  Sustained improvement with slide board transfer bed to   Sustained improvement of upper extremity function and strength    Vital Signs Last 24 Hrs  T(C): 36.4 (28 Nov 2023 09:01), Max: 36.4 (27 Nov 2023 20:00)  T(F): 97.6 (28 Nov 2023 09:01), Max: 97.6 (27 Nov 2023 20:00)  HR: 80 (28 Nov 2023 09:01) (80 - 84)  BP: 104/68 (28 Nov 2023 09:01) (104/68 - 109/70)  RR: 16 (28 Nov 2023 09:01) (16 - 16)  SpO2: 93% (28 Nov 2023 09:01) (93% - 94%)      PHYSICAL EXAM:  Gen -  Comfortable,  at bedside -normal oxy sat and subsequently self ambulating WC  Pulm - clear  Cardiovascular - HS i and II intermittently irregular  Abdomen - Soft, non tender,   Extremities - edema to b/l LE, no calf tenderness, LUE Med line    Neuro-  Cognitive - awake, alert, fully oriented,  Light tough sensation diffusely reduced on fingers and dorsal foot, and over right lower jaw, localized area approx 2 cm, no skin changes noted, non progressive   Motor -                    LEFT    UE - 4/5                    RIGHT UE - 4/5                     LEFT    LE - 3+/5, distally and 3/5 proximal                    RIGHT LE - Ankles PF 3/5 ,DF 2/5, Knee ext 3/5 Hips limited by pain Rt hip due to ulcer at ECHO access site   Sensory - decreased light tough sensation fingers and sole of foot, difusely  MSK: improvement with motor strength  Psychiatric - Mood stable, Affect WNL  Skin -- , stage 2 pressure injury to right buttock 4 x1 and left buttock 3x1, stage 2 pressure injury ti right inner thigh 0.2 x 0.2, right groin wound 10.5 x 2.0, Left groin wound 0.6x1.8.0.1cm  Mild discomfort on pressure over Rt inguinal ligament, but no swelling or erythema  MMT--Able to contract B/L upper back muscles, EF/EE 4/5 distally, knee Est 3+/5      RECENT LABS/IMAGING---[emdomg as LEFT ARM MED LINE IS NON FUNCTIONAL       MEDICATIONS  (STANDING):  apixaban 5 milliGRAM(s) Oral every 12 hours  artificial  tears Solution 1 Drop(s) Both EYES every 12 hours  ascorbic acid 500 milliGRAM(s) Oral daily  atovaquone  Suspension 1500 milliGRAM(s) Oral daily  dextrose 5%. 1000 milliLiter(s) (100 mL/Hr) IV Continuous <Continuous>  dextrose 5%. 1000 milliLiter(s) (50 mL/Hr) IV Continuous <Continuous>  dextrose 50% Injectable 12.5 Gram(s) IV Push once  dextrose 50% Injectable 25 Gram(s) IV Push once  dextrose 50% Injectable 25 Gram(s) IV Push once  folic acid 1 milliGRAM(s) Oral daily  gabapentin 300 milliGRAM(s) Oral three times a day  glucagon  Injectable 1 milliGRAM(s) IntraMuscular once  lactobacillus acidophilus 1 Tablet(s) Oral two times a day with meals  melatonin 6 milliGRAM(s) Oral at bedtime  methylPREDNISolone 36 milliGRAM(s) Oral daily  metoprolol tartrate 12.5 milliGRAM(s) Oral two times a day  multivitamin 1 Tablet(s) Oral daily  nystatin Powder 1 Application(s) Topical two times a day  pantoprazole    Tablet 40 milliGRAM(s) Oral before breakfast  sodium chloride 0.9% lock flush 5 milliLiter(s) IV Push two times a day  zinc oxide 40% Paste 1 Application(s) Topical three times a day    MEDICATIONS  (PRN):  albuterol/ipratropium for Nebulization 3 milliLiter(s) Nebulizer every 6 hours PRN Shortness of Breath and/or Wheezing  ALPRAZolam 0.25 milliGRAM(s) Oral every 6 hours PRN Anxiety  aluminum hydroxide/magnesium hydroxide/simethicone Suspension 30 milliLiter(s) Oral every 4 hours PRN Dyspepsia  ammonium lactate 12% Lotion 1 Application(s) Topical every 12 hours PRN BL feet dryness  benzocaine/menthol Lozenge 1 Lozenge Oral two times a day PRN Sore Throat  benzonatate 100 milliGRAM(s) Oral three times a day PRN Cough  bisacodyl 5 milliGRAM(s) Oral <User Schedule> PRN Constipation  dextrose Oral Gel 15 Gram(s) Oral once PRN Blood Glucose LESS THAN 70 milliGRAM(s)/deciliter  hydrocortisone hemorrhoidal Suppository 1 Suppository(s) Rectal two times a day PRN Hemorrhoids  loperamide 2 milliGRAM(s) Oral three times a day PRN Diarrhea  polyethylene glycol 3350 17 Gram(s) Oral daily PRN Constipation  psyllium Powder 1 Packet(s) Oral daily PRN Constipation  SIMETHICONE SOFTGELS 250 milliGRAM(s),SIMETHICONE SOFTGELS 250 milliGRAM(s) 250 milliGRAM(s) Oral three times a day PRN for gas  sodium chloride 0.65% Nasal 1 Spray(s) Both Nostrils every 8 hours PRN Nasal Congestion

## 2023-11-28 NOTE — PROGRESS NOTE ADULT - ASSESSMENT
62F with AF, hx sciatica, newly diagnosed ILD (likely associated with MCTD +ARIANA, +RNP, +Sm) with exacerbation requiring ECMO / plasmapharesis / Rituximab / steriods, now at acute rehab    #Transaminitis  - LFT downtrending   - limit Tylenol use, avoid statin and other hepatoxic meds  - monitor    #chronic macrocytic anemia  - H/H remains stable  - on MVI, folate and thiamine    #leukocytosis due to steroid use  - resolved     #Interstitial Lung Disease  #Mixed connective tissue disease  #Suspect critical illness myopathy from prolonged ICU stay, resolved  - CT 11/10 new predominant peripheral reticular opacities, within lung paranchyma; no significant bronchiectasis or honeycombing.   -c/w steroids - taper as scheduled (2 week intervals)   -c/w nebs  -c/w PT/OT  -Mepron for PCP ppx while on steroids   -check ambulatory O2 sat periodically    #PAF  #Non-occlusive right IJ thrombus   -c/w Eliquis  -c/w lopressor 12.5mg po BID  -goal HR for pAfib is <110    #Anxiety  -appreciate psych follow-up  -c/w Xanax PRN    # ?JACEY  # nocturnal desat  - discussed about CPAP use. oxygen use during sleep and PRN. patient may need OP sleep study. patient is aware. she will speak to her pulm. informed RN to check ambulatory O2 sat during therapy and at rest.     #  Severe protein-calorie malnutrition.  - nutrition on board    #DVT ppx: Eliquis

## 2023-11-28 NOTE — PROGRESS NOTE ADULT - ASSESSMENT
Assessment/Plan:  ALIZA FOLEY is a 62 year old female with PMH of pAFIB and sciatica; who presented to [facility] ED with SOB, and was found to have groundglass opacities and interstitial lung disease. She was treated with empiric Vancomycin and Zosyn. She underwent a bronchoscopy with bronchoalveolar lavage and pulse steroids. She was given IV diuretics for increased pulmonary congestion, but her respiratory status continued to worsen.  On 9/13, she was transferred to [facility] for ECMO requirements. She was further treated with Plasmapheresis, Rituximab and high-dose steroids, with significant improvement. Her overall hospital course was complicated by thrombocytopenia (s/p several platelet transfusions), leukocytosis (infectious workup entirely negative- s/p Vanco and Ceftriaxone), AFIB with RVR (resolved with Metoprolol), dysphagia (requiring NGT), transaminitis (secondary to acute illness and hypotension),  non-occlusive RIJ DVT (started on Lovenox). Patient now admitted for a multidisciplinary rehab program. 10-05-23 @ 13:18    * Sustained functional improvement including progress with Wt bearing LE and improvement with ability at transfers, concentrate on prox LE muscle strengthening  * Making progress with slide board transfers, ambulating with walker today minimal distance   * Will attain labs once midline replaced  * Constipation- continue to monitor BMs     #Interstitial Lung Disease and Mixed connective tissue disease  - Gait Instability, ADL impairments and Functional impairments: start Comprehensive Rehab Program of PT/OT/SLP - 3 hours a day, 5 days a week  - P&O as needed   - Non-specific Interstitial Lung Disease  - Requiring ECMO   - Improvement s/p Plasmapheresis, Rituximab and high- dose steroids   - Albuterol/Ipratropium Nebulizer every 6 hours  - Mepron 1500mg daily  - PO Medrol ( due to liver dysfunction): Plan will be to taper by ~20% every 2 weeks    Medrol   64mg x 2 weeks   56mg x 2 weeks  44mg x 2 weeks   36mg x 2 weeks - Follow up Outpatient   - Plan to wean 02 as tolerated, Continue tapering oxygen,  -  CT Chest noted 11/10---Resp f/u review  11/14, appreciated  They reports sustained improvement, clinical (normal oxy sats on R/A, sustained progress with oxy tapering), and radiological (no acute findings on recent CT Chest, rather residual chronic infiltrative diesease noted, with recommendation to repeat CT after Acute rehab treatment     #Posttnasal drip--ENT review 11/7, declined scope, continue flonase     #pAFIB/Rt Ij thrombus  - Metoprolol 25mg BID and lovenox  --- Eliquis 5mg BID - tolerating well     # Chronic Tinnitus   - ENT review and recs appreciate, Patient already made ENT appointment for f/u    # Lymphedema both legs -chronic and Rt IJ thrombus  - ACE Wrap BL LEs  - BL LE doppler negative for DVT  - BL UE doppler with chronic thrombotic changes in RIJ     #Transaminitis --LFT Down trending   - Secondary to acute illness and hypotension at [facility]  - Outpatient follow up     #Leucocytosis--steroid related, continue monitoring     #Neuropathy  - Gabapentin 300 mg BID, will consider increasing dose, increase to TID 11/10  --Work on motor strengthening, priority for UE as preferred by patient   - Vit b12 >2,000 in 9/2023  --Will consider Rhuematology f/u if needed    #Sleep/Mood  - Melatonin 6mg at HS  - Psych rec Xanax 0.25mg PRN    #Skin  - Skin: Left lower abdomen ruptured blister 3.0 x 1.0, stage 2 pressure injury to right buttock 4 x1 and left buttock 3x1, stage 2 pressure injury ti right inner thigh 0.2 x 0.2, right groin wound 10.5 x 2.0,   Wound care review, Left groin wound 11/15--- 0.6x1.8.0.1cm, no slough, healthy base  -- MAD to skin folds- Nystatin to submammary and abdominal pannus BID  -- MAD to L groin- cleanse with NS, Triad cream daily  -- Mad to R groin- Nystatin powder daily   -- R groin wound-- aquacel packing, Triad to periwound, Allevyn foam- daily and PRN   - Pressure injury/Skin: OOB to Chair, PT/OT   - Offload pressure, low air loss support surfaces, T&P every 2 hours   --Wound care consult-10/9 -Multiple wounds--perineal and buttock/sacral, recs appreciated   --Suggest Continue treatments as below:   Twice daily & PRN Normal Saline Cleanse of abdominal pannus & breast folds, perineal, Left & Right inner buttock erosions.  Pat thoroughly dry.   Apply Desitin generously twice daily (& PRN) to perineal, buttocks, and left groin erosions, leave open to air.    Apply Nystatin powder to abdominal pannus and breast folds.  Suggest use of Interdry cloths in folds to 'wick' moisture.  Suggest daily Normal Saline Cleanse of right groin surgical wound, pat dry.  Apply Cavillon film barrier to intact periwound skin.  Place Aquacell strip over open area, cover with foam dressing.  - Wound care following     #Pain Mgmt   - Tylenol PRN   - Gabapentin 300mg at HS     #GI/Bowel Mgmt/Hx of alternating bowel movements  - Pantoprazole  - PRN: Maalox   --Lactobacillus BID     #/Bladder Mgmt   -Spontaneously voiding   - UA (11/3) negative    #FEN   #Dysphagia  - Diet - Minced & Moist  [CC]    - Dysphagia- SLP evaluation     #Health maintenance  - Folic Acid   - MVI  - Ocean nasal spray    # Difficulty with access to peripheral veins-- Blood draws from Left arm Medline     #Precautions / PROPHYLAXIS:   - Falls  - ortho: Weight bearing status: WBAT   - Lungs: Aspiration, Incentive Spirometer   - DVT PPX: Eliquis 5 mg BID   ----------------------------------------  11/20 --Labs CBC mild anemia, normal renal function, LFT elevated, downtrending   ----------------------------------------  Liaison with other providers/agencies    PEER TO PEER with Dr Tripp (patient's insurance company)  * Peer to Peer completed, insurance approval retrospectively given to cover from 11/14 to 11/20, Insurance co awaits updated notes to determine further approval of coverage   SW team to send updated clinical and functional notes to medical insurance company  ----------------------------------------  IDT conference on 11/22  Social Work: Approved with insurance til 11/26  SLP: --  OT: Sup for UBD/CGA. Min A for LBD. Total A for all other ADLs/transfers.  PT: Mod A for transfers with SB. 5 steps in parallel bars with Max A of 3. 150ft WC mobility with CGA.   Amb 50 ft with hoyerlift and cardiac walker.   RT: --n/a   DME: AYLIN Ocampo, hip kit, hospital bed, WC.   Barriers: Weakness.   Functional level on DC: Min A with SB transfers.   TDD: 12/6 to home.  ----------------------------------------  OUTPATIENT/FOLLOW UP:    Trae Whitney  Pulmonary Disease  [address], [address] [place] [address]  Phone: [phone]  Fax: [phone]  Follow Up Time: 2 weeks    Ryanne Allen  Rheumatology  [address] [address]  Phone: [phone]  Fax: [phone]  Follow Up Time: 1 week    Kyle Pierce  Internal Medicine  [address] [place] [address], [address]  [place] [address]  Phone: [phone]  Fax: [phone]  Follow Up Time: 2 weeks    Addy Powers  Pulmonary Disease  [address] [facility], [address]  [place], [place] [address]  Phone: [phone]  Fax: [phone]  Follow Up Time:     Kwame Hawley  Critical Care Medicine  [address] [facility], [address]  [place] [address]  Phone: [phone]  Fax: [phone]  Follow Up Time:     Neena Borrego  Rheumatology  [address] [facility], [address]  [place] [address]  Phone: [phone]  Fax: [phone]  Follow Up Time:    [facility]  [address]  Scheduled Appointment: 09/13/2023  2:40 PM     Assessment/Plan:  ALIZA FOLEY is a 62 year old female with PMH of pAFIB and sciatica; who presented to [facility] ED with SOB, and was found to have groundglass opacities and interstitial lung disease. She was treated with empiric Vancomycin and Zosyn. She underwent a bronchoscopy with bronchoalveolar lavage and pulse steroids. She was given IV diuretics for increased pulmonary congestion, but her respiratory status continued to worsen.  On 9/13, she was transferred to [facility] for ECMO requirements. She was further treated with Plasmapheresis, Rituximab and high-dose steroids, with significant improvement. Her overall hospital course was complicated by thrombocytopenia (s/p several platelet transfusions), leukocytosis (infectious workup entirely negative- s/p Vanco and Ceftriaxone), AFIB with RVR (resolved with Metoprolol), dysphagia (requiring NGT), transaminitis (secondary to acute illness and hypotension),  non-occlusive RIJ DVT (started on Lovenox). Patient now admitted for a multidisciplinary rehab program. 10-05-23 @ 13:18    * Sustained functional improvement including progress with Wt bearing LE and improvement with ability at transfers, concentrate on prox LE muscle strengthening  * Making progress with slide board transfers, ambulating with walker today minimal distance   * Will attain labs once midline replaced  * Constipation- continue to monitor BMs     #Interstitial Lung Disease and Mixed connective tissue disease  - Gait Instability, ADL impairments and Functional impairments: start Comprehensive Rehab Program of PT/OT/SLP - 3 hours a day, 5 days a week  - P&O as needed   - Non-specific Interstitial Lung Disease  - Requiring ECMO   - Improvement s/p Plasmapheresis, Rituximab and high- dose steroids   - Albuterol/Ipratropium Nebulizer every 6 hours  - Mepron 1500mg daily  - PO Medrol ( due to liver dysfunction): Plan will be to taper by ~20% every 2 weeks    Medrol   64mg x 2 weeks   56mg x 2 weeks  44mg x 2 weeks   36mg x 2 weeks - Follow up Outpatient   - Plan to wean 02 as tolerated, Continue tapering oxygen,  -  CT Chest noted 11/10---Resp f/u review  11/14, appreciated  They reports sustained improvement, clinical (normal oxy sats on R/A, sustained progress with oxy tapering), and radiological (no acute findings on recent CT Chest, rather residual chronic infiltrative diesease noted, with recommendation to repeat CT after Acute rehab treatment     #Posttnasal drip--ENT review 11/7, declined scope, continue flonase     #pAFIB/Rt Ij thrombus  - Metoprolol 25mg BID and lovenox  --- Eliquis 5mg BID - tolerating well     # Chronic Tinnitus   - ENT review and recs appreciate, Patient already made ENT appointment for f/u    # Lymphedema both legs -chronic and Rt IJ thrombus  - ACE Wrap BL LEs  - BL LE doppler negative for DVT  - BL UE doppler with chronic thrombotic changes in RIJ     #Transaminitis --LFT Down trending   - Secondary to acute illness and hypotension at [facility]  - Outpatient follow up     #Leucocytosis--steroid related, continue monitoring     #Neuropathy  - Gabapentin 300 mg BID, will consider increasing dose, increase to TID 11/10  --Work on motor strengthening, priority for UE as preferred by patient   - Vit b12 >2,000 in 9/2023  --Will consider Rhuematology f/u if needed    #Sleep/Mood  - Melatonin 6mg at HS  - Psych rec Xanax 0.25mg PRN    #Skin  - Skin: Left lower abdomen ruptured blister 3.0 x 1.0, stage 2 pressure injury to right buttock 4 x1 and left buttock 3x1, stage 2 pressure injury ti right inner thigh 0.2 x 0.2, right groin wound 10.5 x 2.0,   Wound care review, Left groin wound 11/15--- 0.6x1.8.0.1cm, no slough, healthy base  -- MAD to skin folds- Nystatin to submammary and abdominal pannus BID  -- MAD to L groin- cleanse with NS, Triad cream daily  -- Mad to R groin- Nystatin powder daily   -- R groin wound-- aquacel packing, Triad to periwound, Allevyn foam- daily and PRN   - Pressure injury/Skin: OOB to Chair, PT/OT   - Offload pressure, low air loss support surfaces, T&P every 2 hours   --Wound care consult-10/9 -Multiple wounds--perineal and buttock/sacral, recs appreciated   --Suggest Continue treatments as below:   Twice daily & PRN Normal Saline Cleanse of abdominal pannus & breast folds, perineal, Left & Right inner buttock erosions.  Pat thoroughly dry.   Apply Desitin generously twice daily (& PRN) to perineal, buttocks, and left groin erosions, leave open to air.    Apply Nystatin powder to abdominal pannus and breast folds.  Suggest use of Interdry cloths in folds to 'wick' moisture.  Suggest daily Normal Saline Cleanse of right groin surgical wound, pat dry.  Apply Cavillon film barrier to intact periwound skin.  Place Aquacell strip over open area, cover with foam dressing.  - Wound care following     #Pain Mgmt   - Tylenol PRN   - Gabapentin 300mg at HS     #GI/Bowel Mgmt/Hx of alternating bowel movements  - Pantoprazole  - PRN: Maalox   --Lactobacillus BID     #/Bladder Mgmt   -Spontaneously voiding   - UA (11/3) negative    #FEN   #Dysphagia  - Diet - Minced & Moist  [CC]    - Dysphagia- SLP evaluation     #Health maintenance  - Folic Acid   - MVI  - Ocean nasal spray    # Difficulty with access to peripheral veins-- Blood draws from Left arm Medline     #Precautions / PROPHYLAXIS:   - Falls  - ortho: Weight bearing status: WBAT   - Lungs: Aspiration, Incentive Spirometer   - DVT PPX: Eliquis 5 mg BID   ----------------------------------------  11/20 --Labs CBC mild anemia, normal renal function, LFT elevated, downtrending   ----------------------------------------  Liaison with other providers/agencies    PEER TO PEER with Dr Tripp (patient's insurance company)  * Peer to Peer completed, insurance approval retrospectively given to cover from 11/14 to 11/20, Insurance co awaits updated notes to determine further approval of coverage   SW team to send updated clinical and functional notes to medical insurance company  ----------------------------------------  IDT conference on 11/28   Social Work: May consider private hire.   SLP: --  OT: Setup for eating/grooming/UBD. Min A for LBD. Toilet transfer with mod A x2 (Lori steady). Standing balance improving to assist of 1 person.   PT: SB transfers with min A. Sit to stand transfer with Mod A x2 people. Amb 15ft with RW. Mod I with WC management.  RT: Yet to participate.   DME: WC, RW, drop arm commode, SB   Barriers: Easily fatigued, poor endurance.  Functional level on DC: Supervision for SB transfers. Toileting/bathing for mod A.   TDD: 12/6 to home    ----------------------------------------  OUTPATIENT/FOLLOW UP:    Trae Whitney  Pulmonary Disease  [address], [address] [place] [address]  Phone: [phone]  Fax: [phone]  Follow Up Time: 2 weeks    Ryanne Allen  Rheumatology  [address] [address]  Phone: [phone]  Fax: [phone]  Follow Up Time: 1 week    Kyle Pierce  Internal Medicine  [address] [place] [address], [address]  [place] [address]  Phone: [phone]  Fax: [phone]  Follow Up Time: 2 weeks    Addy Powers  Pulmonary Disease  [address] [facility], [address] [id number]  Phone: [phone]  Fax: [phone]  Follow Up Time:     Kwame Hawley  Critical Care Medicine  [address] [facility], [facility] [address]  [place] [address]  Phone: [phone]  Fax: [phone]  Follow Up Time:     Neena Borrego  Rheumatology  [address] [facility], [address] 3  [place] [address]  Phone: [phone]  Fax: [phone]  Follow Up Time:    Chacho Dumont  [facility]  [address]  Scheduled Appointment: 09/13/2023  2:40 PM     Assessment/Plan:  ALIZA FOLEY is a 62 year old female with PMH of pAFIB and sciatica; who presented to [facility] ED with SOB, and was found to have groundglass opacities and interstitial lung disease. She was treated with empiric Vancomycin and Zosyn. She underwent a bronchoscopy with bronchoalveolar lavage and pulse steroids. She was given IV diuretics for increased pulmonary congestion, but her respiratory status continued to worsen.  On 9/13, she was transferred to [facility] for ECMO requirements. She was further treated with Plasmapheresis, Rituximab and high-dose steroids, with significant improvement. Her overall hospital course was complicated by thrombocytopenia (s/p several platelet transfusions), leukocytosis (infectious workup entirely negative- s/p Vanco and Ceftriaxone), AFIB with RVR (resolved with Metoprolol), dysphagia (requiring NGT), transaminitis (secondary to acute illness and hypotension),  non-occlusive RIJ DVT (started on Lovenox). Patient now admitted for a multidisciplinary rehab program. 10-05-23 @ 13:18    * Sustained functional improvement including progress with Wt bearing LE and improvement with ability at transfers, concentrate on prox LE muscle strengthening  * Making progress with slide board transfers, ambulating with walker today minimal distance, ambulating increasing distance with walker 15 ft today from 5 ft yesterday  * Will discuss another attempt by phlebotomy for routine once/wk labs tomorrow      #Interstitial Lung Disease and Mixed connective tissue disease  - Gait Instability, ADL impairments and Functional impairments: start Comprehensive Rehab Program of PT/OT/SLP - 3 hours a day, 5 days a week  - P&O as needed   - Non-specific Interstitial Lung Disease  - Requiring ECMO   - Improvement s/p Plasmapheresis, Rituximab and high- dose steroids   - Albuterol/Ipratropium Nebulizer every 6 hours  - Mepron 1500mg daily  - PO Medrol ( due to liver dysfunction): Plan will be to taper by ~20% every 2 weeks    Medrol   64mg x 2 weeks   56mg x 2 weeks  44mg x 2 weeks   36mg x 2 weeks - Follow up Outpatient   - Plan to wean 02 as tolerated, Continue tapering oxygen,  -  CT Chest noted 11/10---Resp f/u review  11/14, appreciated  They reports sustained improvement, clinical (normal oxy sats on R/A, sustained progress with oxy tapering), and radiological (no acute findings on recent CT Chest, rather residual chronic infiltrative diesease noted, with recommendation to repeat CT after Acute rehab treatment     #Posttnasal drip--ENT review 11/7, declined scope, continue flonase     #pAFIB/Rt Ij thrombus  - Metoprolol 25mg BID and lovenox  --- Eliquis 5mg BID - tolerating well     # Chronic Tinnitus   - ENT review and recs appreciate, Patient already made ENT appointment for f/u    # Lymphedema both legs -chronic and Rt IJ thrombus  - ACE Wrap BL LEs  - BL LE doppler negative for DVT  - BL UE doppler with chronic thrombotic changes in RIJ     #Transaminitis --LFT Down trending   - Secondary to acute illness and hypotension at [facility]  - Outpatient follow up     #Leucocytosis--steroid related, continue monitoring     #Neuropathy  - Gabapentin 300 mg BID, will consider increasing dose, increase to TID 11/10  --Work on motor strengthening, priority for UE as preferred by patient   - Vit b12 >2,000 in 9/2023  --Will consider Rhuematology f/u if needed    #Sleep/Mood  - Melatonin 6mg at HS  - Psych rec Xanax 0.25mg PRN    #Skin  - Skin: Left lower abdomen ruptured blister 3.0 x 1.0, stage 2 pressure injury to right buttock 4 x1 and left buttock 3x1, stage 2 pressure injury ti right inner thigh 0.2 x 0.2, right groin wound 10.5 x 2.0,   Wound care review, Left groin wound 11/15--- 0.6x1.8.0.1cm, no slough, healthy base  -- MAD to skin folds- Nystatin to submammary and abdominal pannus BID  -- MAD to L groin- cleanse with NS, Triad cream daily  -- Mad to R groin- Nystatin powder daily   -- R groin wound-- aquacel packing, Triad to periwound, Allevyn foam- daily and PRN   - Pressure injury/Skin: OOB to Chair, PT/OT   - Offload pressure, low air loss support surfaces, T&P every 2 hours   --Wound care consult-10/9 -Multiple wounds--perineal and buttock/sacral, recs appreciated   --Suggest Continue treatments as below:   Twice daily & PRN Normal Saline Cleanse of abdominal pannus & breast folds, perineal, Left & Right inner buttock erosions.  Pat thoroughly dry.   Apply Desitin generously twice daily (& PRN) to perineal, buttocks, and left groin erosions, leave open to air.    Apply Nystatin powder to abdominal pannus and breast folds.  Suggest use of Interdry cloths in folds to 'wick' moisture.  Suggest daily Normal Saline Cleanse of right groin surgical wound, pat dry.  Apply Cavillon film barrier to intact periwound skin.  Place Aquacell strip over open area, cover with foam dressing.  - Wound care following     #Pain Mgmt   - Tylenol PRN   - Gabapentin 300mg at HS     #GI/Bowel Mgmt/Hx of alternating bowel movements  - Pantoprazole  - PRN: Maalox   --Lactobacillus BID     #/Bladder Mgmt   -Spontaneously voiding   - UA (11/3) negative    #FEN   #Dysphagia  - Diet - Minced & Moist  [CC]    - Dysphagia- SLP evaluation     #Health maintenance  - Folic Acid   - MVI  - Ocean nasal spray    # Difficulty with access to peripheral veins-- Blood draws from Left arm Medline     #Precautions / PROPHYLAXIS:   - Falls  - ortho: Weight bearing status: WBAT   - Lungs: Aspiration, Incentive Spirometer   - DVT PPX: Eliquis 5 mg BID   ----------------------------------------  11/20 --Labs CBC mild anemia, normal renal function, LFT elevated, downtrending   ----------------------------------------  Liaison with other providers/agencies    PEER TO PEER with Dr Tripp (patient's insurance company)  * Peer to Peer completed, insurance approval retrospectively given to cover from 11/14 to 11/20, Insurance co awaits updated notes to determine further approval of coverage   SW team to send updated clinical and functional notes to medical insurance company  ----------------------------------------  IDT conference on 11/28   Social Work: May consider private hire.   SLP: --  OT: Setup for eating/grooming/UBD. Min A for LBD. Toilet transfer with mod A x2 (Lori steady). Standing balance improving to assist of 1 person.   PT: SB transfers with min A. Sit to stand transfer with Mod A x2 people. Amb 15ft with RW. Mod I with WC management.  RT: Yet to participate.   DME: WC, RW, drop arm commode, SB   Barriers: Easily fatigued, poor endurance.  Functional level on DC: Supervision for SB transfers. Toileting/bathing for mod A.   TDD: 12/6 to home    ----------------------------------------  OUTPATIENT/FOLLOW UP:    Trae Whitney  Pulmonary Disease  [address], [address] [place] [address]  Phone: [phone]  Fax: [phone]  Follow Up Time: 2 weeks    Ryanne Allen  Rheumatology  [address] [address]  Phone: [phone]  Fax: [phone]  Follow Up Time: 1 week    Kyle Pierce  Internal Medicine  [address] [place] [address], [address]  [place] [address]  Phone: [phone]  Fax: [phone]  Follow Up Time: 2 weeks    Addy Powers  Pulmonary Disease  [address] [facility] [address]  [place], [place] [address]  Phone: [phone]  Fax: [phone]  Follow Up Time:     Kwame Hawley  Critical Care Medicine  [address] [facility], [address]  [place] [address]  Phone: [phone]  Fax: [phone]  Follow Up Time:     Neena Borrego  Rheumatology  [address] [place] [address], [address]  [place] [address]  Phone: [phone]  Fax: [phone]  Follow Up Time:    [facility]  [address]  Scheduled Appointment: 09/13/2023  2:40 PM

## 2023-11-29 PROCEDURE — 99223 1ST HOSP IP/OBS HIGH 75: CPT

## 2023-11-29 PROCEDURE — 99232 SBSQ HOSP IP/OBS MODERATE 35: CPT

## 2023-11-29 PROCEDURE — 99233 SBSQ HOSP IP/OBS HIGH 50: CPT

## 2023-11-29 RX ADMIN — Medication 36 MILLIGRAM(S): at 10:30

## 2023-11-29 RX ADMIN — PANTOPRAZOLE SODIUM 40 MILLIGRAM(S): 20 TABLET, DELAYED RELEASE ORAL at 10:30

## 2023-11-29 RX ADMIN — Medication 1 TABLET(S): at 20:44

## 2023-11-29 RX ADMIN — Medication 12.5 MILLIGRAM(S): at 10:33

## 2023-11-29 RX ADMIN — GABAPENTIN 300 MILLIGRAM(S): 400 CAPSULE ORAL at 10:33

## 2023-11-29 RX ADMIN — ZINC OXIDE 1 APPLICATION(S): 200 OINTMENT TOPICAL at 10:34

## 2023-11-29 RX ADMIN — APIXABAN 5 MILLIGRAM(S): 2.5 TABLET, FILM COATED ORAL at 20:45

## 2023-11-29 RX ADMIN — GABAPENTIN 300 MILLIGRAM(S): 400 CAPSULE ORAL at 15:06

## 2023-11-29 RX ADMIN — GABAPENTIN 300 MILLIGRAM(S): 400 CAPSULE ORAL at 17:54

## 2023-11-29 RX ADMIN — Medication 1 MILLIGRAM(S): at 20:45

## 2023-11-29 RX ADMIN — Medication 1 DROP(S): at 10:34

## 2023-11-29 RX ADMIN — Medication 12.5 MILLIGRAM(S): at 20:44

## 2023-11-29 RX ADMIN — Medication 1 TABLET(S): at 10:30

## 2023-11-29 RX ADMIN — ZINC OXIDE 1 APPLICATION(S): 200 OINTMENT TOPICAL at 14:59

## 2023-11-29 RX ADMIN — Medication 1 DROP(S): at 20:45

## 2023-11-29 RX ADMIN — NYSTATIN CREAM 1 APPLICATION(S): 100000 CREAM TOPICAL at 10:34

## 2023-11-29 RX ADMIN — Medication 500 MILLIGRAM(S): at 20:45

## 2023-11-29 RX ADMIN — APIXABAN 5 MILLIGRAM(S): 2.5 TABLET, FILM COATED ORAL at 10:30

## 2023-11-29 RX ADMIN — ATOVAQUONE 1500 MILLIGRAM(S): 750 SUSPENSION ORAL at 20:45

## 2023-11-29 NOTE — PROGRESS NOTE ADULT - NS ATTEND AMEND GEN_ALL_CORE FT
Seen and examined, note revised, findings as noted    Reports distressing persisting numbness left post scalp and right lower jaw with loss of hair  Had regular BM yesterday    Therapy today--significant improvement with ambulatory distance with walker    Labs unremarkable     Continue therapy  Rheumatology consult   Bld draw from peripheral line, no need for Med line

## 2023-11-29 NOTE — PROGRESS NOTE ADULT - SUBJECTIVE AND OBJECTIVE BOX
SUBJECTIVE/ROS:  Patient seen and examined in therapy this AM with Dr. López  Admits to sleeping well.  No plan for midline replacement, pt agreeable to peripheral blood lab draws.  Experiencing R mandible and occipital numbness, with associated hair loss.  Discussed plan for Rheumatology consult.     ROS--No chest or abd pain, no palpitations nausea or vomiting  Tingling/numbness as noted  LBM 11/28    Therapy.  Observed today during therapy  Slide board transfer bed to , mod assist x 2 persons  Ambulating 15 ft with walker  Required 2 person assist Sit to stand, but once stanging, was able to advance walker and ambulate CGA up to 15 ft (improvement from 5 ft yesterday)  Continues to ambulate functional distance with manual WC, without assistance  Sustained improvement with slide board transfer bed to   Sustained improvement of upper extremity function and strength      Vital Signs Last 24 Hrs  T(C): 36.8 (28 Nov 2023 19:15), Max: 36.8 (28 Nov 2023 19:15)  T(F): 98.2 (28 Nov 2023 19:15), Max: 98.2 (28 Nov 2023 19:15)  HR: 84 (28 Nov 2023 19:15) (84 - 84)  BP: 109/70 (28 Nov 2023 19:15) (109/70 - 109/70)  BP(mean): --  RR: 16 (28 Nov 2023 19:15) (16 - 16)  SpO2: 94% (28 Nov 2023 19:15) (94% - 94%)      PHYSICAL EXAM:  Gen -  Comfortable,  at bedside -normal oxy sat and subsequently self ambulating   Pulm - clear  Cardiovascular - HS i and II intermittently irregular  Abdomen - Soft, non tender,   Extremities - edema to b/l LE, no calf tenderness, LUE Med line    Neuro-  Cognitive - awake, alert, fully oriented,  Light tough sensation diffusely reduced on fingers and dorsal foot, and over right lower jaw, localized area approx 2 cm, no skin changes noted, non progressive   Motor -                    LEFT    UE - 4/5                    RIGHT UE - 4/5                     LEFT    LE - 3+/5, distally and 3/5 proximal                    RIGHT LE - Ankles PF 3/5 ,DF 2/5, Knee ext 3/5 Hips limited by pain Rt hip due to ulcer at ECHO access site   Sensory - decreased light tough sensation fingers and sole of foot, difusely  MSK: improvement with motor strength  Psychiatric - Mood stable, Affect WNL  Skin -- , stage 2 pressure injury to right buttock 4 x1 and left buttock 3x1, stage 2 pressure injury ti right inner thigh 0.2 x 0.2, right groin wound 10.5 x 2.0, Left groin wound 0.6x1.8.0.1cm  Mild discomfort on pressure over Rt inguinal ligament, but no swelling or erythema  MMT--Able to contract B/L upper back muscles, EF/EE 4/5 distally, knee Est 3+/5      RECENT LABS/IMAGING---[emdomg as LEFT ARM MED LINE IS NON FUNCTIONAL       MEDICATIONS  (STANDING):  apixaban 5 milliGRAM(s) Oral every 12 hours  artificial  tears Solution 1 Drop(s) Both EYES every 12 hours  ascorbic acid 500 milliGRAM(s) Oral daily  atovaquone  Suspension 1500 milliGRAM(s) Oral daily  dextrose 5%. 1000 milliLiter(s) (100 mL/Hr) IV Continuous <Continuous>  dextrose 5%. 1000 milliLiter(s) (50 mL/Hr) IV Continuous <Continuous>  dextrose 50% Injectable 12.5 Gram(s) IV Push once  dextrose 50% Injectable 25 Gram(s) IV Push once  dextrose 50% Injectable 25 Gram(s) IV Push once  folic acid 1 milliGRAM(s) Oral daily  gabapentin 300 milliGRAM(s) Oral three times a day  glucagon  Injectable 1 milliGRAM(s) IntraMuscular once  lactobacillus acidophilus 1 Tablet(s) Oral two times a day with meals  melatonin 6 milliGRAM(s) Oral at bedtime  methylPREDNISolone 36 milliGRAM(s) Oral daily  metoprolol tartrate 12.5 milliGRAM(s) Oral two times a day  multivitamin 1 Tablet(s) Oral daily  nystatin Powder 1 Application(s) Topical two times a day  pantoprazole    Tablet 40 milliGRAM(s) Oral before breakfast  sodium chloride 0.9% lock flush 5 milliLiter(s) IV Push two times a day  zinc oxide 40% Paste 1 Application(s) Topical three times a day    MEDICATIONS  (PRN):  albuterol/ipratropium for Nebulization 3 milliLiter(s) Nebulizer every 6 hours PRN Shortness of Breath and/or Wheezing  ALPRAZolam 0.25 milliGRAM(s) Oral every 6 hours PRN Anxiety  aluminum hydroxide/magnesium hydroxide/simethicone Suspension 30 milliLiter(s) Oral every 4 hours PRN Dyspepsia  ammonium lactate 12% Lotion 1 Application(s) Topical every 12 hours PRN BL feet dryness  benzocaine/menthol Lozenge 1 Lozenge Oral two times a day PRN Sore Throat  benzonatate 100 milliGRAM(s) Oral three times a day PRN Cough  bisacodyl 5 milliGRAM(s) Oral <User Schedule> PRN Constipation  dextrose Oral Gel 15 Gram(s) Oral once PRN Blood Glucose LESS THAN 70 milliGRAM(s)/deciliter  hydrocortisone hemorrhoidal Suppository 1 Suppository(s) Rectal two times a day PRN Hemorrhoids  loperamide 2 milliGRAM(s) Oral three times a day PRN Diarrhea  polyethylene glycol 3350 17 Gram(s) Oral daily PRN Constipation  psyllium Powder 1 Packet(s) Oral daily PRN Constipation  SIMETHICONE SOFTGELS 250 milliGRAM(s),SIMETHICONE SOFTGELS 250 milliGRAM(s) 250 milliGRAM(s) Oral three times a day PRN for gas  sodium chloride 0.65% Nasal 1 Spray(s) Both Nostrils every 8 hours PRN Nasal Congestion    SUBJECTIVE/ROS:  Patient seen and examined in therapy this AM with Dr. López  Admits to sleeping well.  No plan for midline replacement, pt agreeable to peripheral blood lab draws.  Experiencing R mandible and occipital numbness, with associated hair loss.  Discussed plan for Rheumatology consult.     ROS--No chest or abd pain, no palpitations nausea or vomiting  Tingling/numbness as noted  LBM 11/28    Therapy.  Observed today during therapy--sit to stand with mod assist x 2, but ambulated with walker 20 ft, improvement with balance noted (improvement from 15 ft yesterday)  Continues to ambulate functional distance with manual WC, without assistance  Sustained improvement with slide board transfer bed to   Sustained improvement of upper extremity function and strength      Vital Signs Last 24 Hrs  T(C): 36.8 (28 Nov 2023 19:15), Max: 36.8 (28 Nov 2023 19:15)  T(F): 98.2 (28 Nov 2023 19:15), Max: 98.2 (28 Nov 2023 19:15)  HR: 84 (28 Nov 2023 19:15) (84 - 84)  BP: 109/70 (28 Nov 2023 19:15) (109/70 - 109/70)  RR: 16 (28 Nov 2023 19:15) (16 - 16)  SpO2: 94% (28 Nov 2023 19:15) (94% - 94%)      PHYSICAL EXAM:  Gen -  Comfortable,  at bedside -normal oxy sat and subsequently self ambulating   Pulm - clear  Cardiovascular - HS i and II intermittently irregular  Abdomen - Soft, non tender,   Extremities - edema to b/l LE, no calf tenderness, LUE Med line    Neuro-  Cognitive - awake, alert, fully oriented,  Light tough sensation diffusely reduced on fingers and dorsal foot, and over right lower jaw, localized area approx 2 cm, no skin changes noted, non progressive   Motor -                    LEFT    UE - 4/5                    RIGHT UE - 4/5                     LEFT    LE - 3+/5, distally and 3/5 proximal                    RIGHT LE - Ankles PF 3/5 ,DF 2/5, Knee ext 3/5 Hips limited by pain Rt hip due to ulcer at ECHO access site   Sensory - decreased light tough sensation fingers and sole of foot, difusely  MSK: improvement with motor strength  Psychiatric - Mood stable, Affect WNL  Skin -- , stage 2 pressure injury to right buttock 4 x1 and left buttock 3x1, stage 2 pressure injury ti right inner thigh 0.2 x 0.2, right groin wound 10.5 x 2.0, Left groin wound 0.6x1.8.0.1cm  Mild discomfort on pressure over Rt inguinal ligament, but no swelling or erythema  MMT--Able to contract B/L upper back muscles, EF/EE 4/5 distally, knee Est 3+/5      RECENT LABS/IMAGING---[emdomg as LEFT ARM MED LINE IS NON FUNCTIONAL     RECENT LABS/IMAGING                        11.0   14.60 )-----------( 315      ( 28 Nov 2023 16:29 )             35.3     11-28    137  |  102  |  25<H>  ----------------------------<  205<H>  4.2   |  26  |  0.66    Ca    9.2      28 Nov 2023 16:29    TPro  6.0  /  Alb  3.1<L>  /  TBili  0.8  /  DBili  x   /  AST  57<H>  /  ALT  93<H>  /  AlkPhos  227<H>  11-28      Urinalysis Basic - ( 28 Nov 2023 16:29 )    Color: x / Appearance: x / SG: x / pH: x  Gluc: 205 mg/dL / Ketone: x  / Bili: x / Urobili: x   Blood: x / Protein: x / Nitrite: x   Leuk Esterase: x / RBC: x / WBC x   Sq Epi: x / Non Sq Epi: x / Bacteria: x    MEDICATIONS  (STANDING):  apixaban 5 milliGRAM(s) Oral every 12 hours  artificial  tears Solution 1 Drop(s) Both EYES every 12 hours  ascorbic acid 500 milliGRAM(s) Oral daily  atovaquone  Suspension 1500 milliGRAM(s) Oral daily  dextrose 5%. 1000 milliLiter(s) (100 mL/Hr) IV Continuous <Continuous>  dextrose 5%. 1000 milliLiter(s) (50 mL/Hr) IV Continuous <Continuous>  dextrose 50% Injectable 12.5 Gram(s) IV Push once  dextrose 50% Injectable 25 Gram(s) IV Push once  dextrose 50% Injectable 25 Gram(s) IV Push once  folic acid 1 milliGRAM(s) Oral daily  gabapentin 300 milliGRAM(s) Oral three times a day  glucagon  Injectable 1 milliGRAM(s) IntraMuscular once  lactobacillus acidophilus 1 Tablet(s) Oral two times a day with meals  melatonin 6 milliGRAM(s) Oral at bedtime  methylPREDNISolone 36 milliGRAM(s) Oral daily  metoprolol tartrate 12.5 milliGRAM(s) Oral two times a day  multivitamin 1 Tablet(s) Oral daily  nystatin Powder 1 Application(s) Topical two times a day  pantoprazole    Tablet 40 milliGRAM(s) Oral before breakfast  sodium chloride 0.9% lock flush 5 milliLiter(s) IV Push two times a day  zinc oxide 40% Paste 1 Application(s) Topical three times a day    MEDICATIONS  (PRN):  albuterol/ipratropium for Nebulization 3 milliLiter(s) Nebulizer every 6 hours PRN Shortness of Breath and/or Wheezing  ALPRAZolam 0.25 milliGRAM(s) Oral every 6 hours PRN Anxiety  aluminum hydroxide/magnesium hydroxide/simethicone Suspension 30 milliLiter(s) Oral every 4 hours PRN Dyspepsia  ammonium lactate 12% Lotion 1 Application(s) Topical every 12 hours PRN BL feet dryness  benzocaine/menthol Lozenge 1 Lozenge Oral two times a day PRN Sore Throat  benzonatate 100 milliGRAM(s) Oral three times a day PRN Cough  bisacodyl 5 milliGRAM(s) Oral <User Schedule> PRN Constipation  dextrose Oral Gel 15 Gram(s) Oral once PRN Blood Glucose LESS THAN 70 milliGRAM(s)/deciliter  hydrocortisone hemorrhoidal Suppository 1 Suppository(s) Rectal two times a day PRN Hemorrhoids  loperamide 2 milliGRAM(s) Oral three times a day PRN Diarrhea  polyethylene glycol 3350 17 Gram(s) Oral daily PRN Constipation  psyllium Powder 1 Packet(s) Oral daily PRN Constipation  SIMETHICONE SOFTGELS 250 milliGRAM(s),SIMETHICONE SOFTGELS 250 milliGRAM(s) 250 milliGRAM(s) Oral three times a day PRN for gas  sodium chloride 0.65% Nasal 1 Spray(s) Both Nostrils every 8 hours PRN Nasal Congestion

## 2023-11-29 NOTE — CONSULT NOTE ADULT - ASSESSMENT
62 year old female with PMH of pAFIB and sciatica, new dx of MCTD related ILD found in setting of acute hypoxic respiratory failure + DAH. Now in acute rehab, tapering steroids well. Rheumatology consulted for new R mandible/posterior occipital numbness and hair loss.     Prior rheum w/u and course summary --   - Serology: ARIANA 1:1280 Speckled, dsDNA <12. + SM, + Anti-RNP, U1-snRNP RNP A 162, U1-snRNP, + p5vmFTG, RNP-70kd 111  - Skin rash on chest with periungual ulcers, ILD, leukopenia, thrombocytopenia.   - CT chest (8/28): Since August 26, 2023, again interstitial lung disease non-UIP pattern. Although no subpleural sparing, possibly interstitialpneumonia, differential includes NSIP associated with connective tissue disorders, chronic HP or drug toxicity.  - Bronchoscopy results (08/30): Bronchial tissue and interstitium showing organizing pneumonia and focal fibrin  - CT chest (9/11): Improved/decreased extent of bilateral groundglass opacities and reticular markings compared to the previous study. Findings are nonspecific but differential etiologies include interstitial pneumonia, drug toxicity, nonspecific connective tissue disorders. New small focus of parenchymal consolidation seen in the lingula;   possible small focal pneumonia.  - Repeat Bronchoscopy (9/13): BAL performed of lingula. Serial BAL x3 performed of RML with progressively bloody return.  - Lab showed thrombocytopenia since she developed respiratory failure ( normal on the first admission to [facility])   - S/p Rituximab 1 gr on 9/16/23  - s/p plasmapheresis (First session (9/15), (9/18), (9/20)  - 9/20/2023: lungs are improving clinically and radiographically. Repeat bronch without signs of DAH  - 9/22: Pt extubated and d/c ECMO  - S/p 2nd dose of Rituximab on 10/3    Plan --   - c/w Medrol 36mg until can follow-up in [place] [facility] fellows clinic - we will facilitate appt once discharge date set   - continue with PCP ppx with Mepron and GI ppx with PPI while on steroids    62 year old female with PMH of pAFIB and sciatica, new dx of MCTD related ILD found in setting of acute hypoxic respiratory failure + DAH. Now in acute rehab, tapering steroids well. 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 Unclear etiology, doesn't appear to have large nerve involvement given mild, stable sx, might be 2/2 ECMO positioning. Suspect hair loss is also 2/2 pressure, age related loss and possible telogen effluvium 2/2 recent critical illness. No evidence of active MCTD sx and tapering well on steroids.     Prior rheum w/u and course summary --   - Serology: ARIANA 1:1280 Speckled, dsDNA <12. + SM, + Anti-RNP, U1-snRNP RNP A 162, U1-snRNP, + o3uvMSB, RNP-70kd 111  - Skin rash on chest with periungual ulcers, ILD, leukopenia, thrombocytopenia.   - CT chest (8/28): Since August 26, 2023, again interstitial lung disease non-UIP pattern. Although no subpleural sparing, possibly interstitial pneumonia, differential includes NSIP associated with connective tissue disorders, chronic HP or drug toxicity.  - Bronchoscopy results (08/30): Bronchial tissue and interstitium showing organizing pneumonia and focal fibrin  - CT chest (9/11): Improved/decreased extent of bilateral groundglass opacities and reticular markings compared to the previous study. Findings are nonspecific but differential etiologies include interstitial pneumonia, drug toxicity, nonspecific connective tissue disorders. New small focus of parenchymal consolidation seen in the lingula;   possible small focal pneumonia.  - Repeat Bronchoscopy (9/13): BAL performed of lingula. Serial BAL x3 performed of RML with progressively bloody return.  - Lab showed thrombocytopenia since she developed respiratory failure ( normal on the first admission to [facility])   - S/p Rituximab 1 gr on 9/16/23  - s/p plasmapheresis (First session (9/15), (9/18), (9/20)  - 9/20/2023: lungs are improving clinically and radiographically. Repeat bronch without signs of DAH  - 9/22: Pt extubated and d/c ECMO  - S/p 2nd dose of Rituximab on 10/3    Plan --   - c/w Medrol 36mg x 2 weeks, then taper to 28mg x 2 weeks then 24mg until can be seen in clinic - I will facilitate appt once discharge date set - please update me on this   - continue with PCP ppx with Mepron and GI ppx with PPI while on steroids   - no further rituxan needed at present time  - can consider neurology eval if neuropathic symptoms worsening but unclear if there are any tests that can really diagnose this - explained this in detail to patient, advised clinical improvement unclear and not able to prognosticate if these paresthesias will improve or not

## 2023-11-29 NOTE — PROGRESS NOTE ADULT - ASSESSMENT
Assessment/Plan:  ALIZA FOLEY is a 62 year old female with PMH of pAFIB and sciatica; who presented to [facility] ED with SOB, and was found to have groundglass opacities and interstitial lung disease. She was treated with empiric Vancomycin and Zosyn. She underwent a bronchoscopy with bronchoalveolar lavage and pulse steroids. She was given IV diuretics for increased pulmonary congestion, but her respiratory status continued to worsen.  On 9/13, she was transferred to [facility] for ECMO requirements. She was further treated with Plasmapheresis, Rituximab and high-dose steroids, with significant improvement. Her overall hospital course was complicated by thrombocytopenia (s/p several platelet transfusions), leukocytosis (infectious workup entirely negative- s/p Vanco and Ceftriaxone), AFIB with RVR (resolved with Metoprolol), dysphagia (requiring NGT), transaminitis (secondary to acute illness and hypotension),  non-occlusive RIJ DVT (started on Lovenox). Patient now admitted for a multidisciplinary rehab program. 10-05-23 @ 13:18    * Sustained functional improvement including progress with Wt bearing LE and improvement with ability at transfers, concentrate on prox LE muscle strengthening  * Making progress with slide board transfers, ambulating with walker today minimal distance, ambulating increasing distance with walker 15 ft today from 5 ft yesterday    * Experiencing R mandible and occipital numbness, with associated hair loss- await Rheumatology recs   * No plan to replace midline- pt agreeable to peripheral blood lab draws     #Interstitial Lung Disease and Mixed connective tissue disease  - Gait Instability, ADL impairments and Functional impairments: start Comprehensive Rehab Program of PT/OT/SLP - 3 hours a day, 5 days a week  - P&O as needed   - Non-specific Interstitial Lung Disease  - Requiring ECMO   - Improvement s/p Plasmapheresis, Rituximab and high- dose steroids   - Albuterol/Ipratropium Nebulizer every 6 hours  - Mepron 1500mg daily  - PO Medrol ( due to liver dysfunction): Plan will be to taper by ~20% every 2 weeks    Medrol   64mg x 2 weeks   56mg x 2 weeks  44mg x 2 weeks   36mg x 2 weeks - Follow up Outpatient   - Plan to wean 02 as tolerated, Continue tapering oxygen,  -  CT Chest noted 11/10---Resp f/u review  11/14, appreciated  They reports sustained improvement, clinical (normal oxy sats on R/A, sustained progress with oxy tapering), and radiological (no acute findings on recent CT Chest, rather residual chronic infiltrative diesease noted, with recommendation to repeat CT after Acute rehab treatment     #Experiencing R mandible and occipital numbness, with associated hair loss- await Rheumatology recs     #Posttnasal drip--ENT review 11/7, declined scope, continue flonase     #pAFIB/Rt Ij thrombus  - Metoprolol 25mg BID and lovenox  --- Eliquis 5mg BID - tolerating well     #Chronic Tinnitus   - ENT review and recs appreciate, Patient already made ENT appointment for f/u    #Lymphedema both legs -chronic and Rt IJ thrombus  - ACE Wrap BL LEs  - BL LE doppler negative for DVT  - BL UE doppler with chronic thrombotic changes in RIJ     #Transaminitis --LFT Down trending   - Secondary to acute illness and hypotension at [facility]  - Outpatient follow up     #Leucocytosis--steroid related, continue monitoring     #Neuropathy  - Gabapentin 300 mg BID, will consider increasing dose, increase to TID 11/10  --Work on motor strengthening, priority for UE as preferred by patient   - Vit b12 >2,000 in 9/2023  --Will consider Rhuematology f/u if needed    #Sleep/Mood  - Melatonin 6mg at HS  - Psych rec Xanax 0.25mg PRN    #Skin  - Skin: Left lower abdomen ruptured blister 3.0 x 1.0, stage 2 pressure injury to right buttock 4 x1 and left buttock 3x1, stage 2 pressure injury ti right inner thigh 0.2 x 0.2, right groin wound 10.5 x 2.0,   Wound care review, Left groin wound 11/15--- 0.6x1.8.0.1cm, no slough, healthy base  -- MAD to skin folds- Nystatin to submammary and abdominal pannus BID  -- MAD to L groin- cleanse with NS, Triad cream daily  -- Mad to R groin- Nystatin powder daily   -- R groin wound-- aquacel packing, Triad to periwound, Allevyn foam- daily and PRN   - Pressure injury/Skin: OOB to Chair, PT/OT   - Offload pressure, low air loss support surfaces, T&P every 2 hours   --Wound care consult-10/9 -Multiple wounds--perineal and buttock/sacral, recs appreciated   --Suggest Continue treatments as below:   Twice daily & PRN Normal Saline Cleanse of abdominal pannus & breast folds, perineal, Left & Right inner buttock erosions.  Pat thoroughly dry.   Apply Desitin generously twice daily (& PRN) to perineal, buttocks, and left groin erosions, leave open to air.    Apply Nystatin powder to abdominal pannus and breast folds.  Suggest use of Interdry cloths in folds to 'wick' moisture.  Suggest daily Normal Saline Cleanse of right groin surgical wound, pat dry.  Apply Cavillon film barrier to intact periwound skin.  Place Aquacell strip over open area, cover with foam dressing.  - Wound care following     #Pain Mgmt   - Tylenol PRN   - Gabapentin 300mg at HS     #GI/Bowel Mgmt/Hx of alternating bowel movements  - Pantoprazole  - PRN: Maalox   --Lactobacillus BID     #/Bladder Mgmt   -Spontaneously voiding   - UA (11/3) negative    #FEN   #Dysphagia  - Diet - Minced & Moist  [CC]    - Dysphagia- SLP evaluation     #Health maintenance  - Folic Acid   - MVI  - Ocean nasal spray    # Difficulty with access to peripheral veins-- Blood draws from Left arm Medline     #Precautions / PROPHYLAXIS:   - Falls  - ortho: Weight bearing status: WBAT   - Lungs: Aspiration, Incentive Spirometer   - DVT PPX: Eliquis 5 mg BID   ----------------------------------------  11/20 --Labs CBC mild anemia, normal renal function, LFT elevated, downtrending   ----------------------------------------  Liaison with other providers/agencies    PEER TO PEER with Dr Tripp (patient's insurance company)  * Peer to Peer completed, insurance approval retrospectively given to cover from 11/14 to 11/20, Insurance co awaits updated notes to determine further approval of coverage   SW team to send updated clinical and functional notes to medical insurance company  ----------------------------------------  IDT conference on 11/28   Social Work: May consider private hire.   SLP: --  OT: Setup for eating/grooming/UBD. Min A for LBD. Toilet transfer with mod A x2 (Lori steady). Standing balance improving to assist of 1 person.   PT: SB transfers with min A. Sit to stand transfer with Mod A x2 people. Amb 15ft with RW. Mod I with WC management.  RT: Yet to participate.   DME: WC, RW, drop arm commode, SB   Barriers: Easily fatigued, poor endurance.  Functional level on DC: Supervision for SB transfers. Toileting/bathing for mod A.   TDD: 12/6 to home    ----------------------------------------  OUTPATIENT/FOLLOW UP:    Trae Whitney  Pulmonary Disease  [address], [address] [place] [address]  Phone: [phone]  Fax: [phone]  Follow Up Time: 2 weeks    Ryanne Allen  Rheumatology  [address] [address]  Phone: [phone]  Fax: [phone]  Follow Up Time: 1 week    Kyle Pierce  Internal Medicine  [address] [place] [address], [address]  [place] [address]  Phone: [phone]  Fax: [phone]  Follow Up Time: 2 weeks    Addy Powers  Pulmonary Disease  [address] [facility], [address]  [place], [place] [address]  Phone: [phone]  Fax: [phone]  Follow Up Time:     Kwame Hawley  Critical Care Medicine  [address] [facility], [address]  [place] [address]  Phone: [phone]  Fax: [phone]  Follow Up Time:     Neena Borrego  Rheumatology  [address] [facility], [address]  [place] [address]  Phone: [phone]  Fax: [phone]  Follow Up Time:    [facility]  [facility] [address]  Scheduled Appointment: 09/13/2023  2:40 PM     Assessment/Plan:  ALIZA FOLEY is a 62 year old female with PMH of pAFIB and sciatica; who presented to [facility] ED with SOB, and was found to have groundglass opacities and interstitial lung disease. She was treated with empiric Vancomycin and Zosyn. She underwent a bronchoscopy with bronchoalveolar lavage and pulse steroids. She was given IV diuretics for increased pulmonary congestion, but her respiratory status continued to worsen.  On 9/13, she was transferred to [facility] for ECMO requirements. She was further treated with Plasmapheresis, Rituximab and high-dose steroids, with significant improvement. Her overall hospital course was complicated by thrombocytopenia (s/p several platelet transfusions), leukocytosis (infectious workup entirely negative- s/p Vanco and Ceftriaxone), AFIB with RVR (resolved with Metoprolol), dysphagia (requiring NGT), transaminitis (secondary to acute illness and hypotension),  non-occlusive RIJ DVT (started on Lovenox). Patient now admitted for a multidisciplinary rehab program. 10-05-23 @ 13:18    * Sustained functional improvement including progress with Wt bearing LE and improvement with ability at transfers, concentrate on prox LE muscle strengthening  * Making further progress today, increased ambulatory distance with walker up to 20 ft  * Experiencing R mandible and occipital numbness, with associated hair loss- await Rheumatology recs   * No plan to replace midline- pt agreeable to peripheral blood lab draws   Labs 11/28--unremarakble     #Interstitial Lung Disease and Mixed connective tissue disease  - Gait Instability, ADL impairments and Functional impairments: start Comprehensive Rehab Program of PT/OT/SLP - 3 hours a day, 5 days a week  - P&O as needed   - Non-specific Interstitial Lung Disease  - Requiring ECMO   - Improvement s/p Plasmapheresis, Rituximab and high- dose steroids   - Albuterol/Ipratropium Nebulizer every 6 hours  - Mepron 1500mg daily  - PO Medrol ( due to liver dysfunction): Plan will be to taper by ~20% every 2 weeks    Medrol   64mg x 2 weeks   56mg x 2 weeks  44mg x 2 weeks   36mg x 2 weeks - Follow up Outpatient   - Plan to wean 02 as tolerated, Continue tapering oxygen,  -  CT Chest noted 11/10---Resp f/u review  11/14, appreciated  They reports sustained improvement, clinical (normal oxy sats on R/A, sustained progress with oxy tapering), and radiological (no acute findings on recent CT Chest, rather residual chronic infiltrative diesease noted, with recommendation to repeat CT after Acute rehab treatment     #Experiencing R mandible and occipital numbness, with associated hair loss- await Rheumatology recs     #Posttnasal drip--ENT review 11/7, declined scope, continue flonase     #pAFIB/Rt Ij thrombus  - Metoprolol 25mg BID and lovenox  --- Eliquis 5mg BID - tolerating well     #Chronic Tinnitus   - ENT review and recs appreciate, Patient already made ENT appointment for f/u    #Lymphedema both legs -chronic and Rt IJ thrombus  - ACE Wrap BL LEs  - BL LE doppler negative for DVT  - BL UE doppler with chronic thrombotic changes in RIJ     #Transaminitis --LFT Down trending   - Secondary to acute illness and hypotension at [facility]  - Outpatient follow up     #Leucocytosis--steroid related, continue monitoring     #Neuropathy  - Gabapentin 300 mg BID, will consider increasing dose, increase to TID 11/10  --Work on motor strengthening, priority for UE as preferred by patient   - Vit b12 >2,000 in 9/2023  --Will consider Rhuematology f/u if needed    #Sleep/Mood  - Melatonin 6mg at HS  - Psych rec Xanax 0.25mg PRN    #Skin  - Skin: Left lower abdomen ruptured blister 3.0 x 1.0, stage 2 pressure injury to right buttock 4 x1 and left buttock 3x1, stage 2 pressure injury ti right inner thigh 0.2 x 0.2, right groin wound 10.5 x 2.0,   Wound care review, Left groin wound 11/15--- 0.6x1.8.0.1cm, no slough, healthy base  -- MAD to skin folds- Nystatin to submammary and abdominal pannus BID  -- MAD to L groin- cleanse with NS, Triad cream daily  -- Mad to R groin- Nystatin powder daily   -- R groin wound-- aquacel packing, Triad to periwound, Allevyn foam- daily and PRN   - Pressure injury/Skin: OOB to Chair, PT/OT   - Offload pressure, low air loss support surfaces, T&P every 2 hours   --Wound care consult-10/9 -Multiple wounds--perineal and buttock/sacral, recs appreciated   --Suggest Continue treatments as below:   Twice daily & PRN Normal Saline Cleanse of abdominal pannus & breast folds, perineal, Left & Right inner buttock erosions.  Pat thoroughly dry.   Apply Desitin generously twice daily (& PRN) to perineal, buttocks, and left groin erosions, leave open to air.    Apply Nystatin powder to abdominal pannus and breast folds.  Suggest use of Interdry cloths in folds to 'wick' moisture.  Suggest daily Normal Saline Cleanse of right groin surgical wound, pat dry.  Apply Cavillon film barrier to intact periwound skin.  Place Aquacell strip over open area, cover with foam dressing.  - Wound care following     #Pain Mgmt   - Tylenol PRN   - Gabapentin 300mg at HS     #GI/Bowel Mgmt/Hx of alternating bowel movements  - Pantoprazole  - PRN: Maalox   --Lactobacillus BID     #/Bladder Mgmt   -Spontaneously voiding   - UA (11/3) negative    #FEN   #Dysphagia  - Diet - Minced & Moist  [CC]    - Dysphagia- SLP evaluation     #Health maintenance  - Folic Acid   - MVI  - Ocean nasal spray    # Difficulty with access to peripheral veins-- Blood draws from Left arm Medline     #Precautions / PROPHYLAXIS:   - Falls  - ortho: Weight bearing status: WBAT   - Lungs: Aspiration, Incentive Spirometer   - DVT PPX: Eliquis 5 mg BID   ----------------------------------------  11/28 --Labs CBC mild anemia, normal renal function, LFT elevated, downtrending overall unremarkable   ----------------------------------------  Liaison with other providers/agencies    PEER TO PEER with Dr Tripp (patient's insurance company)  * Peer to Peer completed, insurance approval retrospectively given to cover from 11/14 to 11/20, Insurance co awaits updated notes to determine further approval of coverage   SW team to send updated clinical and functional notes to medical insurance company  ----------------------------------------  IDT conference on 11/28   Social Work: May consider private hire.   SLP: --  OT: Setup for eating/grooming/UBD. Min A for LBD. Toilet transfer with mod A x2 (Lori steady). Standing balance improving to assist of 1 person.   PT: SB transfers with min A. Sit to stand transfer with Mod A x2 people. Amb 15ft with RW. Mod I with WC management.  RT: Yet to participate.   DME: WC, RW, drop arm commode, SB   Barriers: Easily fatigued, poor endurance.  Functional level on DC: Supervision for SB transfers. Toileting/bathing for mod A.   TDD: 12/6 to home    ----------------------------------------  OUTPATIENT/FOLLOW UP:    Trae Whitney  Pulmonary Disease  [address], [address] [place] [address]  Phone: [phone]  Fax: [phone]  Follow Up Time: 2 weeks    Ryanne Allen  Rheumatology  [address] [address]  Phone: [phone]  Fax: [phone]  Follow Up Time: 1 week    Kyle Pierce  Internal Medicine  [address] [place] [address], [address]  [place] [address]  Phone: [phone]  Fax: [phone]  Follow Up Time: 2 weeks    Addy Powers  Pulmonary Disease  [address] [facility], [address]  [place], [place] [address]  Phone: [phone]  Fax: [phone]  Follow Up Time:     Kwame Hawley  Critical Care Medicine  [address] [facility], [address]  [place] [address]  Phone: [phone]  Fax: [phone]  Follow Up Time:     Neena Borrego  Rheumatology  [address] [facility], [address]  [place] [address]  Phone: [phone]  Fax: [phone]  Follow Up Time:    [facility]  [facility] [address]  Scheduled Appointment: 09/13/2023  2:40 PM

## 2023-11-29 NOTE — PROGRESS NOTE ADULT - ASSESSMENT
63 y/o F with chronic AFib, hx sciatica, newly diagnosed ILD (likely associated with MCTD +ARIANA, +RNP, +Sm) with exacerbation requiring ECMO / plasmapharesis / Rituximab / steriods, now at acute rehab.    #Transaminitis - improving  -limit Tylenol use, avoid statin and other hepatoxic meds  -monitor    #Chronic macrocytic anemia  -H/H remains stable  -on MVI, folate and thiamine    #Leukocytosis due to steroid use - resolved     #Interstitial Lung Disease  #Mixed connective tissue disease  #Suspect critical illness myopathy from prolonged ICU stay, resolved  -CT 11/10 new predominant peripheral reticular opacities, within lung paranchyma; no significant bronchiectasis or honeycombing.   -c/w steroids - taper as scheduled (2 week intervals)   -c/w nebs  -c/w PT/OT  -Mepron for PCP ppx while on steroids   -Check ambulatory O2 sat periodically    #PAF  #Non-occlusive right IJ thrombus   -c/w Eliquis  -c/w lopressor 12.5mg po BID  -goal HR for pAfib is <110    #Anxiety  -appreciate psych follow-up  -c/w Xanax PRN    #?JACEY  #nocturnal desat  -discussed about CPAP use. oxygen use during sleep and PRN. patient may need OP sleep study. patient is aware. she will speak to her pulm. informed RN to check ambulatory O2 sat during therapy and at rest.     #Severe protein-calorie malnutrition  -nutrition on board    #DVT ppx: Eliquis 63 y/o F with chronic AFib, hx sciatica, newly diagnosed ILD (likely associated with MCTD +ARIANA, +RNP, +Sm) with exacerbation requiring ECMO / plasmapharesis / Rituximab / steriods, now at acute rehab.    #Transaminitis  -limit Tylenol use, avoid statin and other hepatoxic meds  -monitor, encourage PO hydration    #Chronic macrocytic anemia  -H/H remains stable  -on MVI, folate and thiamine    #Leukocytosis due to steroid use  -Stable, monitor    #Interstitial Lung Disease  #Mixed connective tissue disease  #Suspect critical illness myopathy from prolonged ICU stay, resolved  -CT 11/10 new predominant peripheral reticular opacities, within lung paranchyma; no significant bronchiectasis or honeycombing.   -c/w steroids - taper as scheduled (2 week intervals)   -c/w nebs  -c/w PT/OT  -Mepron for PCP ppx while on steroids   -Check ambulatory O2 sat periodically    #PAF  #Non-occlusive right IJ thrombus   -c/w Eliquis  -c/w lopressor 12.5mg po BID  -goal HR for pAfib is <110    #Anxiety  -appreciate psych follow-up  -c/w Xanax PRN    #?JACEY  #nocturnal desat  -discussed about CPAP use. oxygen use during sleep and PRN. patient may need OP sleep study. patient is aware. she will speak to her pulm. informed RN to check ambulatory O2 sat during therapy and at rest.     #Severe protein-calorie malnutrition  -nutrition on board    #DVT ppx: Eliquis

## 2023-11-29 NOTE — CONSULT NOTE ADULT - SUBJECTIVE AND OBJECTIVE BOX
ALIZA FOLEY  [id number]    HISTORY OF PRESENT ILLNESS: 62 year old female with PMH of pAFIB and sciatica; who presented to [facility] ED with SOB. She has been experiencing chronic SOB and non-productive cough for several months and was worked up in [place] where she had a CT scan which resulted negative. She recently went to Urgent Care due to SOB with ambulating short distances (12-15 feet), and an XR was concerning for BL LL infiltrates. While at [facility],  CXR and CTA were significant for ground glass opacities, and interstitial lung disease, respectively. She was treated with empiric Vancomycin and Zosyn. She underwent a bronchoscopy with bronchoalveolar lavage showing + DAH, s/p pulse steroids, IV diuretics, ultimately needed ECMO and then s/p Plasmapheresis, Rituximab and high-dose steroids, with significant improvement. 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 Her overall hospital course was complicated by thrombocytopenia (s/p several platelet transfusions), leukocytosis (infectious workup entirely negative- s/p Vanco and Ceftriaxone), AFIB with RVR (resolved with Metoprolol), dysphagia (requiring NGT), transaminitis (secondary to acute illness and hypotension),  non-occlusive RIJ DVT (started on Lovenox). PM&R was consulted and deemed her an appropriate candidate for IRF. She was medically stabilized and cleared for discharge to [facility].     Rheumatology consulted for R mandible/posterior occipital numbness and hair loss    PAST MEDICAL & SURGICAL HISTORY:  Afib      Sciatica      Interstitial lung disease      Lymphedema          Review of Systems:  Gen:  No fevers/chills, weight loss  HEENT: No blurry vision, no difficulty swallowing  CVS: No chest pain/palpitations  Resp: No SOB/wheezing  GI: No N/V/C/D/abdominal pain  MSK:  Skin: No new rashes  Neuro: No headaches    MEDICATIONS  (STANDING):  apixaban 5 milliGRAM(s) Oral every 12 hours  artificial  tears Solution 1 Drop(s) Both EYES every 12 hours  ascorbic acid 500 milliGRAM(s) Oral daily  atovaquone  Suspension 1500 milliGRAM(s) Oral daily  dextrose 5%. 1000 milliLiter(s) (100 mL/Hr) IV Continuous <Continuous>  dextrose 5%. 1000 milliLiter(s) (50 mL/Hr) IV Continuous <Continuous>  dextrose 50% Injectable 12.5 Gram(s) IV Push once  dextrose 50% Injectable 25 Gram(s) IV Push once  dextrose 50% Injectable 25 Gram(s) IV Push once  folic acid 1 milliGRAM(s) Oral daily  gabapentin 300 milliGRAM(s) Oral three times a day  glucagon  Injectable 1 milliGRAM(s) IntraMuscular once  lactobacillus acidophilus 1 Tablet(s) Oral two times a day with meals  melatonin 6 milliGRAM(s) Oral at bedtime  methylPREDNISolone 36 milliGRAM(s) Oral daily  metoprolol tartrate 12.5 milliGRAM(s) Oral two times a day  multivitamin 1 Tablet(s) Oral daily  nystatin Powder 1 Application(s) Topical two times a day  pantoprazole    Tablet 40 milliGRAM(s) Oral before breakfast  sodium chloride 0.9% lock flush 5 milliLiter(s) IV Push two times a day  zinc oxide 40% Paste 1 Application(s) Topical three times a day    MEDICATIONS  (PRN):  albuterol/ipratropium for Nebulization 3 milliLiter(s) Nebulizer every 6 hours PRN Shortness of Breath and/or Wheezing  ALPRAZolam 0.25 milliGRAM(s) Oral every 6 hours PRN Anxiety  aluminum hydroxide/magnesium hydroxide/simethicone Suspension 30 milliLiter(s) Oral every 4 hours PRN Dyspepsia  ammonium lactate 12% Lotion 1 Application(s) Topical every 12 hours PRN BL feet dryness  benzocaine/menthol Lozenge 1 Lozenge Oral two times a day PRN Sore Throat  benzonatate 100 milliGRAM(s) Oral three times a day PRN Cough  bisacodyl 5 milliGRAM(s) Oral <User Schedule> PRN Constipation  dextrose Oral Gel 15 Gram(s) Oral once PRN Blood Glucose LESS THAN 70 milliGRAM(s)/deciliter  hydrocortisone hemorrhoidal Suppository 1 Suppository(s) Rectal two times a day PRN Hemorrhoids  loperamide 2 milliGRAM(s) Oral three times a day PRN Diarrhea  polyethylene glycol 3350 17 Gram(s) Oral daily PRN Constipation  psyllium Powder 1 Packet(s) Oral daily PRN Constipation  SIMETHICONE SOFTGELS 250 milliGRAM(s),SIMETHICONE SOFTGELS 250 milliGRAM(s) 250 milliGRAM(s) Oral three times a day PRN for gas  sodium chloride 0.65% Nasal 1 Spray(s) Both Nostrils every 8 hours PRN Nasal Congestion      Allergies    No Known Allergies    Intolerances        PERTINENT MEDICATION HISTORY:    SOCIAL HISTORY:  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EL HISTORY:    FAMILY HISTORY:      Vital Signs Last 24 Hrs  T(C): 36.5 (29 Nov 2023 10:37), Max: 36.8 (28 Nov 2023 19:15)  T(F): 97.7 (29 Nov 2023 10:37), Max: 98.2 (28 Nov 2023 19:15)  HR: 79 (29 Nov 2023 10:37) (79 - 84)  BP: 102/61 (29 Nov 2023 10:37) (102/61 - 109/70)  BP(mean): --  RR: 16 (29 Nov 2023 10:37) (16 - 16)  SpO2: 92% (29 Nov 2023 10:37) (92% - 94%)    Parameters below as of 29 Nov 2023 10:37  Patient On (Oxygen Delivery Method): room air        Daily     Daily     Physical Exam:  General: No apparent distress  HEENT: EOMI, MMM  CVS: +S1/S2, RRR  Resp: CTA b/l  GI: Soft, NT/ND  MSK:    Neuro: AAOx3  muscle strength RUE LUE ; RLE LLE   Skin: no  rashes    LABS:                        11.0   14.60 )-----------( 315      ( 28 Nov 2023 16:29 )             35.3     11-28    137  |  102  |  25<H>  ----------------------------<  205<H>  4.2   |  26  |  0.66    Ca    9.2      28 Nov 2023 16:29    TPro  6.0  /  Alb  3.1<L>  /  TBili  0.8  /  DBili  x   /  AST  57<H>  /  ALT  93<H>  /  AlkPhos  227<H>  11-28      Urinalysis Basic - ( 28 Nov 2023 16:29 )    Color: x / Appearance: x / SG: x / pH: x  Gluc: 205 mg/dL / Ketone: x  / Bili: x / Urobili: x   Blood: x / Protein: x / Nitrite: x   Leuk Esterase: x / RBC: x / WBC x   Sq Epi: x / Non Sq Epi: x / Bacteria: x        RADIOLOGY & ADDITIONAL STUDIES: ALIZA FOLEY  [id number]    HISTORY OF PRESENT ILLNESS: 62 year old female with PMH of pAFIB and sciatica; who presented to [facility] ED with SOB. She has been experiencing chronic SOB and non-productive cough for several months and was worked up in [place] where she had a CT scan which resulted negative. She recently went to Urgent Care due to SOB with ambulating short distances (12-15 feet), and an XR was concerning for BL LL infiltrates. While at [facility],  CXR and CTA were significant for ground glass opacities, and interstitial lung disease, respectively. She was treated with empiric Vancomycin and Zosyn. She underwent a bronchoscopy with bronchoalveolar lavage showing + DAH, s/p pulse steroids, IV diuretics, ultimately needed ECMO and then s/p Plasmapheresis, Rituximab and high-dose steroids, with significant improvement. 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 Her overall hospital course was complicated by thrombocytopenia (s/p several platelet transfusions), leukocytosis (infectious workup entirely negative- s/p Vanco and Ceftriaxone), AFIB with RVR (resolved with Metoprolol), dysphagia (requiring NGT), transaminitis (secondary to acute illness and hypotension),  non-occlusive RIJ DVT (started on Lovenox). PM&R was consulted and deemed her an appropriate candidate for IRF. She was medically stabilized and cleared for discharge to [facility].     Rheumatology consulted for posterior occipital and soft tissue under the jaw numbness and focal hair loss - pt reports former has been since ECMO was discontinued and has not been worsening nor improving, the area under the jaw has more recently become numb but there is no limitation to muscle use including chewing, tasting food/use of tongue, swallowing, hearing or any facial involvement nor TMJ pain. The hair loss is noted as more hairs on her pillow in the morning from the L side of her head but no focal areas of alopecia. Prior finger and toetip paresthesias that she presented with initially are improved. No other areas of paresthesias, remainder of CTD ROS negative, tolerating steroid taper well, strength and mobility improving with PT>      PAST MEDICAL & SURGICAL HISTORY:  Afib      Sciatica      Interstitial lung disease      Lymphedema      Review of Systems:  Gen:  No fevers/chills   HEENT: No blurry vision, no difficulty swallowing  CVS: No chest pain/palpitations  Resp: Improved breathing, needs QHS minimal O2 supplementation   GI: No N/V/C/D/abdominal pain  MSK: No synovitis   Skin: No new rashes  Neuro: No headaches, as above.     MEDICATIONS  (STANDING):  apixaban 5 milliGRAM(s) Oral every 12 hours  artificial  tears Solution 1 Drop(s) Both EYES every 12 hours  ascorbic acid 500 milliGRAM(s) Oral daily  atovaquone  Suspension 1500 milliGRAM(s) Oral daily  dextrose 5%. 1000 milliLiter(s) (100 mL/Hr) IV Continuous <Continuous>  dextrose 5%. 1000 milliLiter(s) (50 mL/Hr) IV Continuous <Continuous>  dextrose 50% Injectable 12.5 Gram(s) IV Push once  dextrose 50% Injectable 25 Gram(s) IV Push once  dextrose 50% Injectable 25 Gram(s) IV Push once  folic acid 1 milliGRAM(s) Oral daily  gabapentin 300 milliGRAM(s) Oral three times a day  glucagon  Injectable 1 milliGRAM(s) IntraMuscular once  lactobacillus acidophilus 1 Tablet(s) Oral two times a day with meals  melatonin 6 milliGRAM(s) Oral at bedtime  methylPREDNISolone 36 milliGRAM(s) Oral daily  metoprolol tartrate 12.5 milliGRAM(s) Oral two times a day  multivitamin 1 Tablet(s) Oral daily  nystatin Powder 1 Application(s) Topical two times a day  pantoprazole    Tablet 40 milliGRAM(s) Oral before breakfast  sodium chloride 0.9% lock flush 5 milliLiter(s) IV Push two times a day  zinc oxide 40% Paste 1 Application(s) Topical three times a day    MEDICATIONS  (PRN):  albuterol/ipratropium for Nebulization 3 milliLiter(s) Nebulizer every 6 hours PRN Shortness of Breath and/or Wheezing  ALPRAZolam 0.25 milliGRAM(s) Oral every 6 hours PRN Anxiety  aluminum hydroxide/magnesium hydroxide/simethicone Suspension 30 milliLiter(s) Oral every 4 hours PRN Dyspepsia  ammonium lactate 12% Lotion 1 Application(s) Topical every 12 hours PRN BL feet dryness  benzocaine/menthol Lozenge 1 Lozenge Oral two times a day PRN Sore Throat  benzonatate 100 milliGRAM(s) Oral three times a day PRN Cough  bisacodyl 5 milliGRAM(s) Oral <User Schedule> PRN Constipation  dextrose Oral Gel 15 Gram(s) Oral once PRN Blood Glucose LESS THAN 70 milliGRAM(s)/deciliter  hydrocortisone hemorrhoidal Suppository 1 Suppository(s) Rectal two times a day PRN Hemorrhoids  loperamide 2 milliGRAM(s) Oral three times a day PRN Diarrhea  polyethylene glycol 3350 17 Gram(s) Oral daily PRN Constipation  psyllium Powder 1 Packet(s) Oral daily PRN Constipation  SIMETHICONE SOFTGELS 250 milliGRAM(s),SIMETHICONE SOFTGELS 250 milliGRAM(s) 250 milliGRAM(s) Oral three times a day PRN for gas  sodium chloride 0.65% Nasal 1 Spray(s) Both Nostrils every 8 hours PRN Nasal Congestion      Allergies    No Known Allergies    Intolerances    PERTINENT MEDICATION HISTORY: per med rec and HPI   SOCIAL HISTORY: lives with partner     FAMILY HISTORY: no rheum d/o     Vital Signs Last 24 Hrs  T(C): 36.5 (29 Nov 2023 10:37), Max: 36.8 (28 Nov 2023 19:15)  T(F): 97.7 (29 Nov 2023 10:37), Max: 98.2 (28 Nov 2023 19:15)  HR: 79 (29 Nov 2023 10:37) (79 - 84)  BP: 102/61 (29 Nov 2023 10:37) (102/61 - 109/70)  RR: 16 (29 Nov 2023 10:37) (16 - 16)  SpO2: 92% (29 Nov 2023 10:37) (92% - 94%)    Parameters below as of 29 Nov 2023 10:37  Patient On (Oxygen Delivery Method): room air     Physical Exam:  General: No apparent distress, AAOx3   HEENT: EOMI, MMM, no oral ulcers   CVS: +S1/S2, RRR  Resp: CTA b/l anteriorly   GI: Soft, NT/ND  MSK: No synovitis, effusions, contractures   Neuro: decreased sensation to light touch over occipital area of scalp and subcutaneous tissue under chin but no focal motor deficits on face   Skin: no focal alopecia, diffuse age related hair thinning, no inflammatory rashes     LABS:                        11.0   14.60 )-----------( 315      ( 28 Nov 2023 16:29 )             35.3     11-28    137  |  102  |  25<H>  ----------------------------<  205<H>  4.2   |  26  |  0.66    Ca    9.2      28 Nov 2023 16:29    TPro  6.0  /  Alb  3.1<L>  /  TBili  0.8  /  DBili  x   /  AST  57<H>  /  ALT  93<H>  /  AlkPhos  227<H>  11-28      Urinalysis Basic - ( 28 Nov 2023 16:29 )    Color: x / Appearance: x / SG: x / pH: x  Gluc: 205 mg/dL / Ketone: x  / Bili: x / Urobili: x   Blood: x / Protein: x / Nitrite: x   Leuk Esterase: x / RBC: x / WBC x   Sq Epi: x / Non Sq Epi: x / Bacteria: x    RADIOLOGY & ADDITIONAL STUDIES:

## 2023-11-29 NOTE — PROGRESS NOTE ADULT - SUBJECTIVE AND OBJECTIVE BOX
Patient is a 62y old  Female who presents with a chief complaint of Interstitial Lung Disease (29 Nov 2023 10:36)      Patient seen and examined at bedside, stable, NAD. denies acute medical complaints.     ALLERGIES:  No Known Allergies    MEDICATIONS  (STANDING):  apixaban 5 milliGRAM(s) Oral every 12 hours  artificial  tears Solution 1 Drop(s) Both EYES every 12 hours  ascorbic acid 500 milliGRAM(s) Oral daily  atovaquone  Suspension 1500 milliGRAM(s) Oral daily  dextrose 5%. 1000 milliLiter(s) (100 mL/Hr) IV Continuous <Continuous>  dextrose 5%. 1000 milliLiter(s) (50 mL/Hr) IV Continuous <Continuous>  dextrose 50% Injectable 12.5 Gram(s) IV Push once  dextrose 50% Injectable 25 Gram(s) IV Push once  dextrose 50% Injectable 25 Gram(s) IV Push once  folic acid 1 milliGRAM(s) Oral daily  gabapentin 300 milliGRAM(s) Oral three times a day  glucagon  Injectable 1 milliGRAM(s) IntraMuscular once  lactobacillus acidophilus 1 Tablet(s) Oral two times a day with meals  melatonin 6 milliGRAM(s) Oral at bedtime  methylPREDNISolone 36 milliGRAM(s) Oral daily  metoprolol tartrate 12.5 milliGRAM(s) Oral two times a day  multivitamin 1 Tablet(s) Oral daily  nystatin Powder 1 Application(s) Topical two times a day  pantoprazole    Tablet 40 milliGRAM(s) Oral before breakfast  sodium chloride 0.9% lock flush 5 milliLiter(s) IV Push two times a day  zinc oxide 40% Paste 1 Application(s) Topical three times a day    MEDICATIONS  (PRN):  albuterol/ipratropium for Nebulization 3 milliLiter(s) Nebulizer every 6 hours PRN Shortness of Breath and/or Wheezing  ALPRAZolam 0.25 milliGRAM(s) Oral every 6 hours PRN Anxiety  aluminum hydroxide/magnesium hydroxide/simethicone Suspension 30 milliLiter(s) Oral every 4 hours PRN Dyspepsia  ammonium lactate 12% Lotion 1 Application(s) Topical every 12 hours PRN BL feet dryness  benzocaine/menthol Lozenge 1 Lozenge Oral two times a day PRN Sore Throat  benzonatate 100 milliGRAM(s) Oral three times a day PRN Cough  bisacodyl 5 milliGRAM(s) Oral <User Schedule> PRN Constipation  dextrose Oral Gel 15 Gram(s) Oral once PRN Blood Glucose LESS THAN 70 milliGRAM(s)/deciliter  hydrocortisone hemorrhoidal Suppository 1 Suppository(s) Rectal two times a day PRN Hemorrhoids  loperamide 2 milliGRAM(s) Oral three times a day PRN Diarrhea  polyethylene glycol 3350 17 Gram(s) Oral daily PRN Constipation  psyllium Powder 1 Packet(s) Oral daily PRN Constipation  SIMETHICONE SOFTGELS 250 milliGRAM(s),SIMETHICONE SOFTGELS 250 milliGRAM(s) 250 milliGRAM(s) Oral three times a day PRN for gas  sodium chloride 0.65% Nasal 1 Spray(s) Both Nostrils every 8 hours PRN Nasal Congestion    Vital Signs Last 24 Hrs  T(F): 97.7 (29 Nov 2023 10:37), Max: 98.2 (28 Nov 2023 19:15)  HR: 79 (29 Nov 2023 10:37) (79 - 84)  BP: 102/61 (29 Nov 2023 10:37) (102/61 - 109/70)  RR: 16 (29 Nov 2023 10:37) (16 - 16)  SpO2: 92% (29 Nov 2023 10:37) (92% - 94%)  I&O's Summary        PHYSICAL EXAM:  General: NAD  ENT: MMM, no scleral icterus  Neck: Supple, No JVD  Lungs: Clear to auscultation bilaterally, no wheezes, rales, rhonchi  Cardio: RRR, S1/S2  Abdomen: Soft, Nontender, Nondistended; Bowel sounds present  Extremities: No calf tenderness, No pitting edema    LABS:                        11.0   14.60 )-----------( 315      ( 28 Nov 2023 16:29 )             35.3       11-28    137  |  102  |  25  ----------------------------<  205  4.2   |  26  |  0.66    Ca    9.2      28 Nov 2023 16:29    TPro  6.0  /  Alb  3.1  /  TBili  0.8  /  DBili  x   /  AST  57  /  ALT  93  /  AlkPhos  227  11-28                09-25 Chol -- LDL -- HDL -- Trig 199 mg/dL                  Urinalysis Basic - ( 28 Nov 2023 16:29 )    Color: x / Appearance: x / SG: x / pH: x  Gluc: 205 mg/dL / Ketone: x  / Bili: x / Urobili: x   Blood: x / Protein: x / Nitrite: x   Leuk Esterase: x / RBC: x / WBC x   Sq Epi: x / Non Sq Epi: x / Bacteria: x            RADIOLOGY & ADDITIONAL TESTS:    Care Discussed with Consultants/Other Providers: yes, rehab

## 2023-11-30 PROCEDURE — 99232 SBSQ HOSP IP/OBS MODERATE 35: CPT | Mod: GC

## 2023-11-30 RX ORDER — LIDOCAINE 4 G/100G
2 CREAM TOPICAL
Refills: 0 | Status: DISCONTINUED | OUTPATIENT
Start: 2023-12-01 | End: 2023-12-20

## 2023-11-30 RX ORDER — LIDOCAINE 4 G/100G
1 CREAM TOPICAL ONCE
Refills: 0 | Status: COMPLETED | OUTPATIENT
Start: 2023-11-30 | End: 2023-11-30

## 2023-11-30 RX ORDER — IBUPROFEN 200 MG
400 TABLET ORAL ONCE
Refills: 0 | Status: COMPLETED | OUTPATIENT
Start: 2023-11-30 | End: 2023-11-30

## 2023-11-30 RX ADMIN — Medication 36 MILLIGRAM(S): at 08:53

## 2023-11-30 RX ADMIN — NYSTATIN CREAM 1 APPLICATION(S): 100000 CREAM TOPICAL at 08:58

## 2023-11-30 RX ADMIN — Medication 1 TABLET(S): at 21:17

## 2023-11-30 RX ADMIN — GABAPENTIN 300 MILLIGRAM(S): 400 CAPSULE ORAL at 13:22

## 2023-11-30 RX ADMIN — Medication 12.5 MILLIGRAM(S): at 21:17

## 2023-11-30 RX ADMIN — PANTOPRAZOLE SODIUM 40 MILLIGRAM(S): 20 TABLET, DELAYED RELEASE ORAL at 08:59

## 2023-11-30 RX ADMIN — Medication 400 MILLIGRAM(S): at 12:16

## 2023-11-30 RX ADMIN — GABAPENTIN 300 MILLIGRAM(S): 400 CAPSULE ORAL at 21:16

## 2023-11-30 RX ADMIN — APIXABAN 5 MILLIGRAM(S): 2.5 TABLET, FILM COATED ORAL at 21:17

## 2023-11-30 RX ADMIN — Medication 500 MILLIGRAM(S): at 21:16

## 2023-11-30 RX ADMIN — Medication 1 TABLET(S): at 08:54

## 2023-11-30 RX ADMIN — LIDOCAINE 1 PATCH: 4 CREAM TOPICAL at 12:17

## 2023-11-30 RX ADMIN — GABAPENTIN 300 MILLIGRAM(S): 400 CAPSULE ORAL at 08:54

## 2023-11-30 RX ADMIN — Medication 1 MILLIGRAM(S): at 21:17

## 2023-11-30 RX ADMIN — LIDOCAINE 1 PATCH: 4 CREAM TOPICAL at 15:15

## 2023-11-30 RX ADMIN — Medication 12.5 MILLIGRAM(S): at 08:53

## 2023-11-30 RX ADMIN — Medication 400 MILLIGRAM(S): at 13:16

## 2023-11-30 RX ADMIN — Medication 1 DROP(S): at 21:18

## 2023-11-30 RX ADMIN — APIXABAN 5 MILLIGRAM(S): 2.5 TABLET, FILM COATED ORAL at 08:54

## 2023-11-30 RX ADMIN — ZINC OXIDE 1 APPLICATION(S): 200 OINTMENT TOPICAL at 13:27

## 2023-11-30 RX ADMIN — ATOVAQUONE 1500 MILLIGRAM(S): 750 SUSPENSION ORAL at 21:16

## 2023-11-30 RX ADMIN — Medication 1 TABLET(S): at 21:16

## 2023-11-30 RX ADMIN — ZINC OXIDE 1 APPLICATION(S): 200 OINTMENT TOPICAL at 08:57

## 2023-11-30 RX ADMIN — Medication 6 MILLIGRAM(S): at 21:17

## 2023-11-30 NOTE — PROGRESS NOTE ADULT - ASSESSMENT
Assessment/Plan:  ALIZA FOLEY is a 62 year old female with PMH of pAFIB and sciatica; who presented to [facility] ED with SOB, and was found to have groundglass opacities and interstitial lung disease. She was treated with empiric Vancomycin and Zosyn. She underwent a bronchoscopy with bronchoalveolar lavage and pulse steroids. She was given IV diuretics for increased pulmonary congestion, but her respiratory status continued to worsen.  On 9/13, she was transferred to [facility] for ECMO requirements. She was further treated with Plasmapheresis, Rituximab and high-dose steroids, with significant improvement. Her overall hospital course was complicated by thrombocytopenia (s/p several platelet transfusions), leukocytosis (infectious workup entirely negative- s/p Vanco and Ceftriaxone), AFIB with RVR (resolved with Metoprolol), dysphagia (requiring NGT), transaminitis (secondary to acute illness and hypotension),  non-occlusive RIJ DVT (started on Lovenox). Patient now admitted for a multidisciplinary rehab program. 10-05-23 @ 13:18    * Sustained functional improvement including progress with Wt bearing LE and improvement with ability at transfers, concentrate on prox LE muscle strengthening  * Making further progress today, increased ambulatory distance with walker up to 20 ft  * Experiencing R mandible and occipital numbness, with associated hair loss- Rheumatology recs outpatient follow up  * No plan to replace midline- pt agreeable to peripheral blood lab draws   * Guided fall due to knee buckling during transfer- no hard landing/head injury- Lidocaine patch to back and Ibuprofen 400mg x1 dose       #Interstitial Lung Disease and Mixed connective tissue disease  - Gait Instability, ADL impairments and Functional impairments: start Comprehensive Rehab Program of PT/OT/SLP - 3 hours a day, 5 days a week  - P&O as needed   - Non-specific Interstitial Lung Disease  - Requiring ECMO   - Improvement s/p Plasmapheresis, Rituximab and high- dose steroids   - Albuterol/Ipratropium Nebulizer every 6 hours  - Mepron 1500mg daily  - PO Medrol ( due to liver dysfunction): Plan will be to taper by ~20% every 2 weeks    Medrol   64mg x 2 weeks   56mg x 2 weeks  44mg x 2 weeks   36mg x 2 weeks - Follow up Outpatient   - Plan to wean 02 as tolerated, Continue tapering oxygen,  -  CT Chest noted 11/10---Resp f/u review  11/14, appreciated  They reports sustained improvement, clinical (normal oxy sats on R/A, sustained progress with oxy tapering), and radiological (no acute findings on recent CT Chest, rather residual chronic infiltrative diesease noted, with recommendation to repeat CT after Acute rehab treatment     #Knee buckling  - S/p guided fall with physical therapy  - Back muscle strain with catching self on RW   - Lidocaine patch to back  - Ibuprofen 400mg x1 dose     #Experiencing R mandible and occipital numbness, with associated hair loss- outpatient follow up with Rheumatology    #Posttnasal drip--ENT review 11/7, declined scope, continue flonase     #pAFIB/Rt Ij thrombus  - Metoprolol 25mg BID and lovenox  --- Eliquis 5mg BID - tolerating well     #Chronic Tinnitus   - ENT review and recs appreciate, Patient already made ENT appointment for f/u    #Lymphedema both legs -chronic and Rt IJ thrombus  - ACE Wrap BL LEs  - BL LE doppler negative for DVT  - BL UE doppler with chronic thrombotic changes in RIJ     #Transaminitis --LFT Down trending   - Secondary to acute illness and hypotension at [facility]  - Outpatient follow up     #Leucocytosis--steroid related, continue monitoring     #Neuropathy  - Gabapentin 300 mg BID, will consider increasing dose, increase to TID 11/10  --Work on motor strengthening, priority for UE as preferred by patient   - Vit b12 >2,000 in 9/2023  --Will consider Rhuematology f/u if needed    #Sleep/Mood  - Melatonin 6mg at HS  - Psych rec Xanax 0.25mg PRN    #Skin  - Skin: Left lower abdomen ruptured blister 3.0 x 1.0, stage 2 pressure injury to right buttock 4 x1 and left buttock 3x1, stage 2 pressure injury ti right inner thigh 0.2 x 0.2, right groin wound 10.5 x 2.0,   Wound care review, Left groin wound 11/15--- 0.6x1.8.0.1cm, no slough, healthy base  -- MAD to skin folds- Nystatin to submammary and abdominal pannus BID  -- MAD to L groin- cleanse with NS, Triad cream daily  -- Mad to R groin- Nystatin powder daily   -- R groin wound-- aquacel packing, Triad to periwound, Allevyn foam- daily and PRN   - Pressure injury/Skin: OOB to Chair, PT/OT   - Offload pressure, low air loss support surfaces, T&P every 2 hours   --Wound care consult-10/9 -Multiple wounds--perineal and buttock/sacral, recs appreciated   --Suggest Continue treatments as below:   Twice daily & PRN Normal Saline Cleanse of abdominal pannus & breast folds, perineal, Left & Right inner buttock erosions.  Pat thoroughly dry.   Apply Desitin generously twice daily (& PRN) to perineal, buttocks, and left groin erosions, leave open to air.    Apply Nystatin powder to abdominal pannus and breast folds.  Suggest use of Interdry cloths in folds to 'wick' moisture.  Suggest daily Normal Saline Cleanse of right groin surgical wound, pat dry.  Apply Cavillon film barrier to intact periwound skin.  Place Aquacell strip over open area, cover with foam dressing.  - Wound care following     #Pain Mgmt   - Tylenol PRN   - Gabapentin 300mg at HS     #GI/Bowel Mgmt/Hx of alternating bowel movements  - Pantoprazole  - PRN: Maalox   --Lactobacillus BID     #/Bladder Mgmt   -Spontaneously voiding   - UA (11/3) negative    #FEN   #Dysphagia  - Diet - Minced & Moist  [CC]    - Dysphagia- SLP evaluation     #Health maintenance  - Folic Acid   - MVI  - Ocean nasal spray    # Difficulty with access to peripheral veins-- Blood draws from Left arm Medline     #Precautions / PROPHYLAXIS:   - Falls  - ortho: Weight bearing status: WBAT   - Lungs: Aspiration, Incentive Spirometer   - DVT PPX: Eliquis 5 mg BID   ----------------------------------------  11/28 --Labs CBC mild anemia, normal renal function, LFT elevated, downtrending overall unremarkable   ----------------------------------------  Liaison with other providers/agencies    PEER TO PEER with Dr Tripp (patient's insurance company)  * Peer to Peer completed, insurance approval retrospectively given to cover from 11/14 to 11/20, Insurance co awaits updated notes to determine further approval of coverage   SW team to send updated clinical and functional notes to medical insurance company  ----------------------------------------  IDT conference on 11/28   Social Work: May consider private hire.   SLP: --  OT: Setup for eating/grooming/UBD. Min A for LBD. Toilet transfer with mod A x2 (Lori steady). Standing balance improving to assist of 1 person.   PT: SB transfers with min A. Sit to stand transfer with Mod A x2 people. Amb 15ft with RW. Mod I with WC management.  RT: Yet to participate.   DME: WC, RW, drop arm commode, SB   Barriers: Easily fatigued, poor endurance.  Functional level on DC: Supervision for SB transfers. Toileting/bathing for mod A.   TDD: 12/6 to home    ----------------------------------------  OUTPATIENT/FOLLOW UP:    Trae Whitney  Pulmonary Disease  [address], [address] [place] [id number]  Phone: [phone]  Fax: [phone]  Follow Up Time: 2 weeks    Ryanne Allen  Rheumatology  [address] [address]  Phone: [phone]  Fax: [phone]  Follow Up Time: 1 week    Kyle Pierce  Internal Medicine  [address] [place] [address], [address]  [place] [address]  Phone: [phone]  Fax: [phone]  Follow Up Time: 2 weeks    Addy Powers  Pulmonary Disease  [address] [facility], [address]  [place], [place] [id number]  Phone: [phone]  Fax: [phone]  Follow Up Time:     Kwame Hawley  Critical Care Medicine  [address] [facility], [address]  [place] [address]  Phone: [phone]  Fax: [phone]  Follow Up Time:     Neena Borrego  Rheumatology  [address] [facility], [address]  [place] [address]  Phone: [phone]  Fax: [phone]  Follow Up Time:    [facility]  [facility] [address]  Scheduled Appointment: 09/13/2023  2:40 PM

## 2023-11-30 NOTE — PROGRESS NOTE ADULT - SUBJECTIVE AND OBJECTIVE BOX
SUBJECTIVE/ROS:  Patient seen and examined at bedside this AM.  Admits to sleeping well.  No plan for midline replacement, pt agreeable to peripheral blood lab draws.  Experiencing R mandible and occipital numbness, with associated hair loss.  Plan for Rheumatology follow up outpatient.     After encounter, patient noted to have guided fall after knee buckling. Pt landed in kneeling position without hard landing, or injuring head. She was guided to the ground by Physical Therapists.   Experiencing back muscle strain from catching self on RW.    ROS--No chest or abd pain, no palpitations nausea or vomiting  Tingling/numbness as noted  LB 11/29    Therapy.  Observed today during therapy--sit to stand with mod assist x 2, but ambulated with walker 20 ft, improvement with balance noted (improvement from 15 ft yesterday)  Continues to ambulate functional distance with manual WC, without assistance  Sustained improvement with slide board transfer bed to WC  Sustained improvement of upper extremity function and strength      Vital Signs Last 24 Hrs  T(C): 36.9 (30 Nov 2023 08:51), Max: 36.9 (30 Nov 2023 08:51)  T(F): 98.5 (30 Nov 2023 08:51), Max: 98.5 (30 Nov 2023 08:51)  HR: 81 (30 Nov 2023 08:51) (81 - 84)  BP: 109/61 (30 Nov 2023 08:51) (109/61 - 120/74)  BP(mean): --  RR: 16 (30 Nov 2023 08:51) (16 - 16)  SpO2: 94% (30 Nov 2023 08:51) (94% - 94%)    PHYSICAL EXAM:  Gen -  Comfortable,  at bedside -normal oxy sat and subsequently self ambulating WC  Pulm - clear  Cardiovascular - HS i and II intermittently irregular  Abdomen - Soft, non tender,   Extremities - edema to b/l LE, no calf tenderness, LUE Med line    Neuro-  Cognitive - awake, alert, fully oriented,  Light tough sensation diffusely reduced on fingers and dorsal foot, and over right lower jaw, localized area approx 2 cm, no skin changes noted, non progressive   Motor -                    LEFT    UE - 4/5                    RIGHT UE - 4/5                     LEFT    LE - 3+/5, distally and 3/5 proximal                    RIGHT LE - Ankles PF 3/5 ,DF 2/5, Knee ext 3/5 Hips limited by pain Rt hip due to ulcer at ECHO access site   Sensory - decreased light tough sensation fingers and sole of foot, difusely  MSK: improvement with motor strength  Psychiatric - Mood stable, Affect WNL  Skin -- , stage 2 pressure injury to right buttock 4 x1 and left buttock 3x1, stage 2 pressure injury ti right inner thigh 0.2 x 0.2, right groin wound 10.5 x 2.0, Left groin wound 0.6x1.8.0.1cm  Mild discomfort on pressure over Rt inguinal ligament, but no swelling or erythema  MMT--Able to contract B/L upper back muscles, EF/EE 4/5 distally, knee Est 3+/5      LABS:                        11.0   14.60 )-----------( 315      ( 28 Nov 2023 16:29 )             35.3     11-28    137  |  102  |  25<H>  ----------------------------<  205<H>  4.2   |  26  |  0.66    Ca    9.2      28 Nov 2023 16:29    TPro  6.0  /  Alb  3.1<L>  /  TBili  0.8  /  DBili  x   /  AST  57<H>  /  ALT  93<H>  /  AlkPhos  227<H>  11-28      Urinalysis Basic - ( 28 Nov 2023 16:29 )    Color: x / Appearance: x / SG: x / pH: x  Gluc: 205 mg/dL / Ketone: x  / Bili: x / Urobili: x   Blood: x / Protein: x / Nitrite: x   Leuk Esterase: x / RBC: x / WBC x   Sq Epi: x / Non Sq Epi: x / Bacteria: x        MEDICATIONS  (STANDING):  apixaban 5 milliGRAM(s) Oral every 12 hours  artificial  tears Solution 1 Drop(s) Both EYES every 12 hours  ascorbic acid 500 milliGRAM(s) Oral daily  atovaquone  Suspension 1500 milliGRAM(s) Oral daily  dextrose 5%. 1000 milliLiter(s) (50 mL/Hr) IV Continuous <Continuous>  dextrose 5%. 1000 milliLiter(s) (100 mL/Hr) IV Continuous <Continuous>  dextrose 50% Injectable 25 Gram(s) IV Push once  dextrose 50% Injectable 25 Gram(s) IV Push once  dextrose 50% Injectable 12.5 Gram(s) IV Push once  folic acid 1 milliGRAM(s) Oral daily  gabapentin 300 milliGRAM(s) Oral three times a day  glucagon  Injectable 1 milliGRAM(s) IntraMuscular once  ibuprofen  Tablet. 400 milliGRAM(s) Oral once  lactobacillus acidophilus 1 Tablet(s) Oral two times a day with meals  lidocaine   4% Patch 1 Patch Transdermal once  melatonin 6 milliGRAM(s) Oral at bedtime  methylPREDNISolone 36 milliGRAM(s) Oral daily  metoprolol tartrate 12.5 milliGRAM(s) Oral two times a day  multivitamin 1 Tablet(s) Oral daily  nystatin Powder 1 Application(s) Topical two times a day  pantoprazole    Tablet 40 milliGRAM(s) Oral before breakfast  sodium chloride 0.9% lock flush 5 milliLiter(s) IV Push two times a day  zinc oxide 40% Paste 1 Application(s) Topical three times a day    MEDICATIONS  (PRN):  albuterol/ipratropium for Nebulization 3 milliLiter(s) Nebulizer every 6 hours PRN Shortness of Breath and/or Wheezing  ALPRAZolam 0.25 milliGRAM(s) Oral every 6 hours PRN Anxiety  aluminum hydroxide/magnesium hydroxide/simethicone Suspension 30 milliLiter(s) Oral every 4 hours PRN Dyspepsia  ammonium lactate 12% Lotion 1 Application(s) Topical every 12 hours PRN BL feet dryness  benzocaine/menthol Lozenge 1 Lozenge Oral two times a day PRN Sore Throat  benzonatate 100 milliGRAM(s) Oral three times a day PRN Cough  bisacodyl 5 milliGRAM(s) Oral <User Schedule> PRN Constipation  dextrose Oral Gel 15 Gram(s) Oral once PRN Blood Glucose LESS THAN 70 milliGRAM(s)/deciliter  hydrocortisone hemorrhoidal Suppository 1 Suppository(s) Rectal two times a day PRN Hemorrhoids  loperamide 2 milliGRAM(s) Oral three times a day PRN Diarrhea  polyethylene glycol 3350 17 Gram(s) Oral daily PRN Constipation  psyllium Powder 1 Packet(s) Oral daily PRN Constipation  SIMETHICONE SOFTGELS 250 milliGRAM(s),SIMETHICONE SOFTGELS 250 milliGRAM(s) 250 milliGRAM(s) Oral three times a day PRN for gas  sodium chloride 0.65% Nasal 1 Spray(s) Both Nostrils every 8 hours PRN Nasal Congestion      SUBJECTIVE/ROS:  Patient seen and examined at bedside this AM.  Admits to sleeping well.  No plan for midline replacement, pt agreeable to peripheral blood lab draws.  Experiencing R mandible and occipital numbness, with associated hair loss.  Plan for Rheumatology follow up outpatient.     After encounter, patient noted to have guided fall after knee buckling. Pt landed in kneeling position without hard landing, or injuring head. She was guided to the ground by Physical Therapists.   Experiencing back muscle strain from catching self on RW.    ROS--No chest or abd pain, no palpitations nausea or vomiting  Tingling/numbness as noted  LB 11/29    Therapy.  Observed today during therapy--sit to stand with mod assist x 2, but ambulated with walker 20 ft, improvement with balance noted (improvement from 15 ft yesterday)  Continues to ambulate functional distance with manual WC, without assistance  Sustained improvement with slide board transfer bed to WC  Sustained improvement of upper extremity function and strength      Vital Signs Last 24 Hrs  T(C): 36.9 (30 Nov 2023 08:51), Max: 36.9 (30 Nov 2023 08:51)  T(F): 98.5 (30 Nov 2023 08:51), Max: 98.5 (30 Nov 2023 08:51)  HR: 81 (30 Nov 2023 08:51) (81 - 84)  BP: 109/61 (30 Nov 2023 08:51) (109/61 - 120/74)  RR: 16 (30 Nov 2023 08:51) (16 - 16)  SpO2: 94% (30 Nov 2023 08:51) (94% - 94%)    PHYSICAL EXAM:  Gen -  Comfortable,  at bedside -normal oxy sat and subsequently self ambulating WC  Pulm - clear  Cardiovascular - HS i and II intermittently irregular  Abdomen - Soft, non tender,   Extremities - edema to b/l LE, no calf tenderness, LUE Med line    Neuro-  Cognitive - awake, alert, fully oriented,  Light tough sensation diffusely reduced on fingers and dorsal foot, and over right lower jaw, localized area approx 2 cm, no skin changes noted, non progressive   Motor -                    LEFT    UE - 4/5                    RIGHT UE - 4/5                     LEFT    LE - 3+/5, distally and 3/5 proximal                    RIGHT LE - Ankles PF 3/5 ,DF 2/5, Knee ext 3/5 Hips limited by pain Rt hip due to ulcer at ECHO access site   Sensory - decreased light tough sensation fingers and sole of foot, difusely  MSK: improvement with motor strength  Psychiatric - Mood stable, Affect WNL  Skin -- , stage 2 pressure injury to right buttock 4 x1 and left buttock 3x1, stage 2 pressure injury ti right inner thigh 0.2 x 0.2, right groin wound 10.5 x 2.0, Left groin wound 0.6x1.8.0.1cm  Mild discomfort on pressure over Rt inguinal ligament, but no swelling or erythema  MMT--Able to contract B/L upper back muscles, EF/EE 4/5 distally, knee Est 3+/5      LABS:                        11.0   14.60 )-----------( 315      ( 28 Nov 2023 16:29 )             35.3     11-28    137  |  102  |  25<H>  ----------------------------<  205<H>  4.2   |  26  |  0.66    Ca    9.2      28 Nov 2023 16:29    TPro  6.0  /  Alb  3.1<L>  /  TBili  0.8  /  DBili  x   /  AST  57<H>  /  ALT  93<H>  /  AlkPhos  227<H>  11-28      MEDICATIONS  (STANDING):  apixaban 5 milliGRAM(s) Oral every 12 hours  artificial  tears Solution 1 Drop(s) Both EYES every 12 hours  ascorbic acid 500 milliGRAM(s) Oral daily  atovaquone  Suspension 1500 milliGRAM(s) Oral daily  dextrose 5%. 1000 milliLiter(s) (50 mL/Hr) IV Continuous <Continuous>  dextrose 5%. 1000 milliLiter(s) (100 mL/Hr) IV Continuous <Continuous>  dextrose 50% Injectable 25 Gram(s) IV Push once  dextrose 50% Injectable 25 Gram(s) IV Push once  dextrose 50% Injectable 12.5 Gram(s) IV Push once  folic acid 1 milliGRAM(s) Oral daily  gabapentin 300 milliGRAM(s) Oral three times a day  glucagon  Injectable 1 milliGRAM(s) IntraMuscular once  ibuprofen  Tablet. 400 milliGRAM(s) Oral once  lactobacillus acidophilus 1 Tablet(s) Oral two times a day with meals  lidocaine   4% Patch 1 Patch Transdermal once  melatonin 6 milliGRAM(s) Oral at bedtime  methylPREDNISolone 36 milliGRAM(s) Oral daily  metoprolol tartrate 12.5 milliGRAM(s) Oral two times a day  multivitamin 1 Tablet(s) Oral daily  nystatin Powder 1 Application(s) Topical two times a day  pantoprazole    Tablet 40 milliGRAM(s) Oral before breakfast  sodium chloride 0.9% lock flush 5 milliLiter(s) IV Push two times a day  zinc oxide 40% Paste 1 Application(s) Topical three times a day    MEDICATIONS  (PRN):  albuterol/ipratropium for Nebulization 3 milliLiter(s) Nebulizer every 6 hours PRN Shortness of Breath and/or Wheezing  ALPRAZolam 0.25 milliGRAM(s) Oral every 6 hours PRN Anxiety  aluminum hydroxide/magnesium hydroxide/simethicone Suspension 30 milliLiter(s) Oral every 4 hours PRN Dyspepsia  ammonium lactate 12% Lotion 1 Application(s) Topical every 12 hours PRN BL feet dryness  benzocaine/menthol Lozenge 1 Lozenge Oral two times a day PRN Sore Throat  benzonatate 100 milliGRAM(s) Oral three times a day PRN Cough  bisacodyl 5 milliGRAM(s) Oral <User Schedule> PRN Constipation  dextrose Oral Gel 15 Gram(s) Oral once PRN Blood Glucose LESS THAN 70 milliGRAM(s)/deciliter  hydrocortisone hemorrhoidal Suppository 1 Suppository(s) Rectal two times a day PRN Hemorrhoids  loperamide 2 milliGRAM(s) Oral three times a day PRN Diarrhea  polyethylene glycol 3350 17 Gram(s) Oral daily PRN Constipation  psyllium Powder 1 Packet(s) Oral daily PRN Constipation  SIMETHICONE SOFTGELS 250 milliGRAM(s),SIMETHICONE SOFTGELS 250 milliGRAM(s) 250 milliGRAM(s) Oral three times a day PRN for gas  sodium chloride 0.65% Nasal 1 Spray(s) Both Nostrils every 8 hours PRN Nasal Congestion

## 2023-12-01 PROCEDURE — 99232 SBSQ HOSP IP/OBS MODERATE 35: CPT

## 2023-12-01 PROCEDURE — 99232 SBSQ HOSP IP/OBS MODERATE 35: CPT | Mod: GC

## 2023-12-01 RX ORDER — BACLOFEN 100 %
5 POWDER (GRAM) MISCELLANEOUS EVERY 8 HOURS
Refills: 0 | Status: DISCONTINUED | OUTPATIENT
Start: 2023-12-01 | End: 2023-12-20

## 2023-12-01 RX ORDER — LIDOCAINE 4 G/100G
2 CREAM TOPICAL ONCE
Refills: 0 | Status: COMPLETED | OUTPATIENT
Start: 2023-12-01 | End: 2023-12-01

## 2023-12-01 RX ORDER — POLYETHYLENE GLYCOL 3350 17 G/17G
17 POWDER, FOR SOLUTION ORAL
Refills: 0 | Status: DISCONTINUED | OUTPATIENT
Start: 2023-12-01 | End: 2023-12-01

## 2023-12-01 RX ORDER — LIDOCAINE 4 G/100G
1 CREAM TOPICAL
Refills: 0 | Status: DISCONTINUED | OUTPATIENT
Start: 2023-12-01 | End: 2023-12-20

## 2023-12-01 RX ORDER — POLYETHYLENE GLYCOL 3350 17 G/17G
17 POWDER, FOR SOLUTION ORAL
Refills: 0 | Status: DISCONTINUED | OUTPATIENT
Start: 2023-12-01 | End: 2023-12-20

## 2023-12-01 RX ADMIN — GABAPENTIN 300 MILLIGRAM(S): 400 CAPSULE ORAL at 09:30

## 2023-12-01 RX ADMIN — Medication 36 MILLIGRAM(S): at 09:30

## 2023-12-01 RX ADMIN — Medication 12.5 MILLIGRAM(S): at 21:10

## 2023-12-01 RX ADMIN — PANTOPRAZOLE SODIUM 40 MILLIGRAM(S): 20 TABLET, DELAYED RELEASE ORAL at 09:31

## 2023-12-01 RX ADMIN — LIDOCAINE 2 PATCH: 4 CREAM TOPICAL at 21:02

## 2023-12-01 RX ADMIN — ZINC OXIDE 1 APPLICATION(S): 200 OINTMENT TOPICAL at 09:32

## 2023-12-01 RX ADMIN — GABAPENTIN 300 MILLIGRAM(S): 400 CAPSULE ORAL at 21:12

## 2023-12-01 RX ADMIN — LIDOCAINE 2 PATCH: 4 CREAM TOPICAL at 09:55

## 2023-12-01 RX ADMIN — Medication 500 MILLIGRAM(S): at 21:12

## 2023-12-01 RX ADMIN — ATOVAQUONE 1500 MILLIGRAM(S): 750 SUSPENSION ORAL at 21:10

## 2023-12-01 RX ADMIN — Medication 1 TABLET(S): at 21:10

## 2023-12-01 RX ADMIN — GABAPENTIN 300 MILLIGRAM(S): 400 CAPSULE ORAL at 16:27

## 2023-12-01 RX ADMIN — APIXABAN 5 MILLIGRAM(S): 2.5 TABLET, FILM COATED ORAL at 09:31

## 2023-12-01 RX ADMIN — APIXABAN 5 MILLIGRAM(S): 2.5 TABLET, FILM COATED ORAL at 21:10

## 2023-12-01 RX ADMIN — Medication 1 MILLIGRAM(S): at 21:10

## 2023-12-01 RX ADMIN — NYSTATIN CREAM 1 APPLICATION(S): 100000 CREAM TOPICAL at 09:33

## 2023-12-01 RX ADMIN — Medication 1 TABLET(S): at 09:31

## 2023-12-01 RX ADMIN — Medication 5 MILLIGRAM(S): at 21:11

## 2023-12-01 RX ADMIN — Medication 1 DROP(S): at 21:10

## 2023-12-01 RX ADMIN — Medication 12.5 MILLIGRAM(S): at 09:31

## 2023-12-01 NOTE — PROGRESS NOTE ADULT - SUBJECTIVE AND OBJECTIVE BOX
Medicine Progress Note    Patient is a 62y old  Female who presents with a chief complaint of Interstitial Lung Disease (01 Dec 2023 11:10)      SUBJECTIVE / OVERNIGHT EVENTS:  seen and examined  Chart reviewed  No overnight events  Limb weakness improving with therapy  BM+  No pain  No complaints    ADDITIONAL REVIEW OF SYSTEMS  denied fever/chills/CP/SOB/cough/palpitation/dizziness/abdominal pian/nausea/vomiting/diarrhoea/constipation/dysuria/leg or calf pain/headaches.all other ROS neg    MEDICATIONS  (STANDING):  apixaban 5 milliGRAM(s) Oral every 12 hours  artificial  tears Solution 1 Drop(s) Both EYES every 12 hours  ascorbic acid 500 milliGRAM(s) Oral daily  atovaquone  Suspension 1500 milliGRAM(s) Oral daily  dextrose 5%. 1000 milliLiter(s) (50 mL/Hr) IV Continuous <Continuous>  dextrose 5%. 1000 milliLiter(s) (100 mL/Hr) IV Continuous <Continuous>  dextrose 50% Injectable 12.5 Gram(s) IV Push once  dextrose 50% Injectable 25 Gram(s) IV Push once  dextrose 50% Injectable 25 Gram(s) IV Push once  folic acid 1 milliGRAM(s) Oral daily  gabapentin 300 milliGRAM(s) Oral three times a day  glucagon  Injectable 1 milliGRAM(s) IntraMuscular once  lactobacillus acidophilus 1 Tablet(s) Oral two times a day with meals  lidocaine   4% Patch 2 Patch Transdermal <User Schedule>  lidocaine   4% Patch 2 Patch Transdermal <User Schedule>  melatonin 6 milliGRAM(s) Oral at bedtime  methylPREDNISolone 36 milliGRAM(s) Oral daily  metoprolol tartrate 12.5 milliGRAM(s) Oral two times a day  multivitamin 1 Tablet(s) Oral daily  nystatin Powder 1 Application(s) Topical two times a day  pantoprazole    Tablet 40 milliGRAM(s) Oral before breakfast  polyethylene glycol 3350 17 Gram(s) Oral <User Schedule>  sodium chloride 0.9% lock flush 5 milliLiter(s) IV Push two times a day  zinc oxide 40% Paste 1 Application(s) Topical three times a day    MEDICATIONS  (PRN):  albuterol/ipratropium for Nebulization 3 milliLiter(s) Nebulizer every 6 hours PRN Shortness of Breath and/or Wheezing  ALPRAZolam 0.25 milliGRAM(s) Oral every 6 hours PRN Anxiety  aluminum hydroxide/magnesium hydroxide/simethicone Suspension 30 milliLiter(s) Oral every 4 hours PRN Dyspepsia  ammonium lactate 12% Lotion 1 Application(s) Topical every 12 hours PRN BL feet dryness  benzocaine/menthol Lozenge 1 Lozenge Oral two times a day PRN Sore Throat  benzonatate 100 milliGRAM(s) Oral three times a day PRN Cough  bisacodyl 5 milliGRAM(s) Oral daily PRN Constipation  dextrose Oral Gel 15 Gram(s) Oral once PRN Blood Glucose LESS THAN 70 milliGRAM(s)/deciliter  hydrocortisone hemorrhoidal Suppository 1 Suppository(s) Rectal two times a day PRN Hemorrhoids  loperamide 2 milliGRAM(s) Oral three times a day PRN Diarrhea  SIMETHICONE SOFTGELS 250 milliGRAM(s),SIMETHICONE SOFTGELS 250 milliGRAM(s) 250 milliGRAM(s) Oral three times a day PRN for gas  sodium chloride 0.65% Nasal 1 Spray(s) Both Nostrils every 8 hours PRN Nasal Congestion    CAPILLARY BLOOD GLUCOSE        I&O's Summary      PHYSICAL EXAM:  Vital Signs Last 24 Hrs  T(C): 36.9 (01 Dec 2023 09:37), Max: 37 (30 Nov 2023 20:00)  T(F): 98.5 (01 Dec 2023 09:37), Max: 98.6 (30 Nov 2023 20:00)  HR: 83 (01 Dec 2023 09:37) (83 - 84)  BP: 107/66 (01 Dec 2023 09:37) (107/66 - 108/69)  BP(mean): --  RR: 16 (01 Dec 2023 09:37) (16 - 16)  SpO2: 92% (01 Dec 2023 09:37) (91% - 92%)    Parameters below as of 01 Dec 2023 09:37  Patient On (Oxygen Delivery Method): room air      GENERAL: Not in distress. Alert    HEENT: clear conjuctiva, MMM. no pallor or icterus  CARDIOVASCULAR: RRR S1, S2. No murmur/rubs/gallop  LUNGS: BLAE reduced, no rales, no wheezing, no rhonchi.    ABDOMEN: ND. Soft,  NT, no guarding / rebound / rigidity. BS normoactive  BACK: No spine tenderness.  EXTREMITIES: no edema. no leg or calf TP.  SKIN: warm and dry  PSYCHIATRIC: Calm.  No agitation.  CNS: AAO. moves limbs, follows commands      LABS:                    COVID-19 PCR: NotDetec (05 Oct 2023 20:17)  SARS-CoV-2: NotDetec (30 Sep 2023 12:52)  SARS-CoV-2: NotDetec (13 Sep 2023 17:55)  SARS-CoV-2: NotDetec (10 Sep 2023 11:24)  SARS-CoV-2: NotDetec (26 Aug 2023 12:55)      RADIOLOGY & ADDITIONAL TESTS:  Imaging from Last 24 Hours:    Electrocardiogram/QTc Interval:    COORDINATION OF CARE:  Care Discussed with Consultants/Other Providers:

## 2023-12-01 NOTE — PROGRESS NOTE ADULT - ASSESSMENT
62F with AF, hx sciatica, newly diagnosed ILD (likely associated with MCTD +ARIANA, +RNP, +Sm) with exacerbation requiring ECMO / plasmapharesis / Rituximab / steriods, now at acute rehab    #Transaminitis  - LFT downtrending   - limit Tylenol use, avoid statin and other hepatoxic meds  - monitor    #chronic macrocytic anemia  - H/H remains stable  - on MVI, folate and thiamine    #leukocytosis due to steroid use  - resolved     #Interstitial Lung Disease  #Mixed connective tissue disease  #Suspect critical illness myopathy from prolonged ICU stay, resolved  - CT 11/10 new predominant peripheral reticular opacities, within lung paranchyma; no significant bronchiectasis or honeycombing.   -c/w steroids - taper as scheduled (2 week intervals)   -c/w nebs  -c/w PT/OT  -Mepron for PCP ppx while on steroids   -check ambulatory O2 sat periodically    #PAF  #Non-occlusive right IJ thrombus   -c/w Eliquis  -c/w lopressor 12.5mg po BID  -goal HR for pAfib is <110    #Anxiety  -appreciate psych follow-up  -c/w Xanax PRN    # ?JACEY  # nocturnal desat  - discussed about CPAP use. oxygen use during sleep and PRN. patient may need OP sleep study. patient is aware. she will speak to her pulm. informed RN to check ambulatory O2 sat during therapy and at rest.     #  Severe protein-calorie malnutrition.  - nutrition on board    #DVT ppx: Eliquis    d/w rehab team at [facility]

## 2023-12-01 NOTE — PROGRESS NOTE ADULT - ASSESSMENT
Assessment/Plan:  ALIZA FOLEY is a 62 year old female with PMH of pAFIB and sciatica; who presented to [facility] ED with SOB, and was found to have groundglass opacities and interstitial lung disease. She was treated with empiric Vancomycin and Zosyn. She underwent a bronchoscopy with bronchoalveolar lavage and pulse steroids. She was given IV diuretics for increased pulmonary congestion, but her respiratory status continued to worsen.  On 9/13, she was transferred to [facility] for ECMO requirements. She was further treated with Plasmapheresis, Rituximab and high-dose steroids, with significant improvement. Her overall hospital course was complicated by thrombocytopenia (s/p several platelet transfusions), leukocytosis (infectious workup entirely negative- s/p Vanco and Ceftriaxone), AFIB with RVR (resolved with Metoprolol), dysphagia (requiring NGT), transaminitis (secondary to acute illness and hypotension),  non-occlusive RIJ DVT (started on Lovenox). Patient now admitted for a multidisciplinary rehab program. 10-05-23 @ 13:18    * Sustained functional improvement including progress with Wt bearing LE and improvement with ability at transfers, concentrate on prox LE muscle strengthening  * Making further progress, increased ambulatory distance with walker up to 20 ft  * Lidocaine patch to BL deltoids and R back- apply heat and massage       #Interstitial Lung Disease and Mixed connective tissue disease  - Gait Instability, ADL impairments and Functional impairments: start Comprehensive Rehab Program of PT/OT/SLP - 3 hours a day, 5 days a week  - P&O as needed   - Non-specific Interstitial Lung Disease  - Requiring ECMO   - Improvement s/p Plasmapheresis, Rituximab and high- dose steroids   - Albuterol/Ipratropium Nebulizer every 6 hours  - Mepron 1500mg daily  - PO Medrol ( due to liver dysfunction): Plan will be to taper by ~20% every 2 weeks    Medrol   64mg x 2 weeks   56mg x 2 weeks  44mg x 2 weeks   36mg x 2 weeks - Follow up Outpatient   - Plan to wean 02 as tolerated, Continue tapering oxygen,  -  CT Chest noted 11/10---Resp f/u review  11/14, appreciated  They reports sustained improvement, clinical (normal oxy sats on R/A, sustained progress with oxy tapering), and radiological (no acute findings on recent CT Chest, rather residual chronic infiltrative diesease noted, with recommendation to repeat CT after Acute rehab treatment     #Knee buckling  - S/p guided fall with physical therapy  - Back muscle strain with catching self on RW   - Lidocaine patch to back and BL deltoids  - Ibuprofen 400mg x1 dose     #Experiencing R mandible and occipital numbness, with associated hair loss- outpatient follow up with Rheumatology    #Posttnasal drip--ENT review 11/7, declined scope, continue flonase     #pAFIB/Rt Ij thrombus  - Metoprolol 25mg BID and lovenox  --- Eliquis 5mg BID - tolerating well     #Chronic Tinnitus   - ENT review and recs appreciate, Patient already made ENT appointment for f/u    #Lymphedema both legs -chronic and Rt IJ thrombus  - ACE Wrap BL LEs  - BL LE doppler negative for DVT  - BL UE doppler with chronic thrombotic changes in RIJ     #Transaminitis --LFT Down trending   - Secondary to acute illness and hypotension at [facility]  - Outpatient follow up     #Leucocytosis--steroid related, continue monitoring     #Neuropathy  - Gabapentin 300 mg BID, will consider increasing dose, increase to TID 11/10  --Work on motor strengthening, priority for UE as preferred by patient   - Vit b12 >2,000 in 9/2023  --Will consider Rhuematology f/u if needed    #Sleep/Mood  - Melatonin 6mg at HS  - Psych rec Xanax 0.25mg PRN    #Skin  - Skin: Left lower abdomen ruptured blister 3.0 x 1.0, stage 2 pressure injury to right buttock 4 x1 and left buttock 3x1, stage 2 pressure injury ti right inner thigh 0.2 x 0.2, right groin wound 10.5 x 2.0,   Wound care review, Left groin wound 11/15--- 0.6x1.8.0.1cm, no slough, healthy base  -- MAD to skin folds- Nystatin to submammary and abdominal pannus BID  -- MAD to L groin- cleanse with NS, Triad cream daily  -- Mad to R groin- Nystatin powder daily   -- R groin wound-- aquacel packing, Triad to periwound, Allevyn foam- daily and PRN   - Pressure injury/Skin: OOB to Chair, PT/OT   - Offload pressure, low air loss support surfaces, T&P every 2 hours   --Wound care consult-10/9 -Multiple wounds--perineal and buttock/sacral, recs appreciated   --Suggest Continue treatments as below:   Twice daily & PRN Normal Saline Cleanse of abdominal pannus & breast folds, perineal, Left & Right inner buttock erosions.  Pat thoroughly dry.   Apply Desitin generously twice daily (& PRN) to perineal, buttocks, and left groin erosions, leave open to air.    Apply Nystatin powder to abdominal pannus and breast folds.  Suggest use of Interdry cloths in folds to 'wick' moisture.  Suggest daily Normal Saline Cleanse of right groin surgical wound, pat dry.  Apply Cavillon film barrier to intact periwound skin.  Place Aquacell strip over open area, cover with foam dressing.  - Wound care following     #Pain Mgmt   - Tylenol PRN   - Gabapentin 300mg at HS     #GI/Bowel Mgmt/Hx of alternating bowel movements  - Pantoprazole  - PRN: Maalox   --Lactobacillus BID     #/Bladder Mgmt   -Spontaneously voiding   - UA (11/3) negative    #FEN   #Dysphagia  - Diet - Minced & Moist  [CC]    - Dysphagia- SLP evaluation     #Health maintenance  - Folic Acid   - MVI  - Ocean nasal spray    # Difficulty with access to peripheral veins-- Blood draws from Left arm Medline     #Precautions / PROPHYLAXIS:   - Falls  - ortho: Weight bearing status: WBAT   - Lungs: Aspiration, Incentive Spirometer   - DVT PPX: Eliquis 5 mg BID   ----------------------------------------  11/28 --Labs CBC mild anemia, normal renal function, LFT elevated, downtrending overall unremarkable   ----------------------------------------  Liaison with other providers/agencies    PEER TO PEER with Dr Tripp (patient's insurance company)  * Peer to Peer completed, insurance approval retrospectively given to cover from 11/14 to 11/20, Insurance co awaits updated notes to determine further approval of coverage   SW team to send updated clinical and functional notes to medical insurance company  ----------------------------------------  IDT conference on 11/28   Social Work: May consider private hire.   SLP: --  OT: Setup for eating/grooming/UBD. Min A for LBD. Toilet transfer with mod A x2 (Lori steady). Standing balance improving to assist of 1 person.   PT: SB transfers with min A. Sit to stand transfer with Mod A x2 people. Amb 15ft with RW. Mod I with WC management.  RT: Yet to participate.   DME: WC, RW, drop arm commode, SB   Barriers: Easily fatigued, poor endurance.  Functional level on DC: Supervision for SB transfers. Toileting/bathing for mod A.   TDD: 12/6 to home    ----------------------------------------  OUTPATIENT/FOLLOW UP:    Trae Whitney  Pulmonary Disease  [address], [address] [place] [address]  Phone: [phone]  Fax: [phone]  Follow Up Time: 2 weeks    Ryanne Allen  Rheumatology  [address] [address]  Phone: [phone]  Fax: [phone]  Follow Up Time: 1 week    Kyle Pierce  Internal Medicine  [address] [place] [address], [address]  [place] [address]  Phone: [phone]  Fax: [phone]  Follow Up Time: 2 weeks    Addy Powers  Pulmonary Disease  [address] [facility], [address]  [place], [place] [address]  Phone: [phone]  Fax: [phone]  Follow Up Time:     Kwame Hawley  Critical Care Medicine  [address] [facility], [address]  [place] [address]  Phone: [phone]  Fax: [phone]  Follow Up Time:     Neena Borrego  Rheumatology  [address] [facility], [address]  [place] [address]  Phone: [phone]  Fax: [phone]  Follow Up Time:    [facility]  [facility] [address]  Scheduled Appointment: 09/13/2023  2:40 PM

## 2023-12-01 NOTE — PROGRESS NOTE ADULT - SUBJECTIVE AND OBJECTIVE BOX
SUBJECTIVE/ROS:  Patient seen and examined at bedside this AM.  Admits to sleeping well.  Still experiencing R mandible and occipital numbness, with associated hair loss- plan for Rheumatology follow up outpatient.   Experiencing BL deltoid and shoulder, and R lower back discomfort post assisted fall yesterday.     ROS--No chest or abd pain, no palpitations nausea or vomiting  Tingling/numbness as noted  LB 11/30    Therapy.  Observed today during therapy--sit to stand with mod assist x 2, but ambulated with walker 20 ft, improvement with balance noted (improvement from 15 ft yesterday)  Continues to ambulate functional distance with manual WC, without assistance  Sustained improvement with slide board transfer bed to WC  Sustained improvement of upper extremity function and strength      Vital Signs Last 24 Hrs  T(C): 36.9 (01 Dec 2023 09:37), Max: 37 (30 Nov 2023 20:00)  T(F): 98.5 (01 Dec 2023 09:37), Max: 98.6 (30 Nov 2023 20:00)  HR: 83 (01 Dec 2023 09:37) (83 - 84)  BP: 107/66 (01 Dec 2023 09:37) (107/66 - 108/69)  BP(mean): --  RR: 16 (01 Dec 2023 09:37) (16 - 16)  SpO2: 92% (01 Dec 2023 09:37) (91% - 92%)      PHYSICAL EXAM:  Gen -  Comfortable,  at bedside -normal oxy sat and subsequently self ambulating WC  Pulm - clear  Cardiovascular - HS i and II intermittently irregular  Abdomen - Soft, non tender,   Extremities - edema to b/l LE, no calf tenderness, LUE Med line    Neuro-  Cognitive - awake, alert, fully oriented,  Light tough sensation diffusely reduced on fingers and dorsal foot, and over right lower jaw, localized area approx 2 cm, no skin changes noted, non progressive   Motor -                    LEFT    UE - 4/5                    RIGHT UE - 4/5                     LEFT    LE - 3+/5, distally and 3/5 proximal                    RIGHT LE - Ankles PF 3/5 ,DF 2/5, Knee ext 3/5 Hips limited by pain Rt hip due to ulcer at ECHO access site   Sensory - decreased light tough sensation fingers and sole of foot, difusely  MSK: improvement with motor strength  Psychiatric - Mood stable, Affect WNL  Skin -- , stage 2 pressure injury to right buttock 4 x1 and left buttock 3x1, stage 2 pressure injury ti right inner thigh 0.2 x 0.2, right groin wound 10.5 x 2.0, Left groin wound 0.6x1.8.0.1cm  Mild discomfort on pressure over Rt inguinal ligament, but no swelling or erythema  MMT--Able to contract B/L upper back muscles, EF/EE 4/5 distally, knee Est 3+/5      LABS:                        11.0   14.60 )-----------( 315      ( 28 Nov 2023 16:29 )             35.3     11-28    137  |  102  |  25<H>  ----------------------------<  205<H>  4.2   |  26  |  0.66    Ca    9.2      28 Nov 2023 16:29    TPro  6.0  /  Alb  3.1<L>  /  TBili  0.8  /  DBili  x   /  AST  57<H>  /  ALT  93<H>  /  AlkPhos  227<H>  11-28        MEDICATIONS  (STANDING):  apixaban 5 milliGRAM(s) Oral every 12 hours  artificial  tears Solution 1 Drop(s) Both EYES every 12 hours  ascorbic acid 500 milliGRAM(s) Oral daily  atovaquone  Suspension 1500 milliGRAM(s) Oral daily  dextrose 5%. 1000 milliLiter(s) (50 mL/Hr) IV Continuous <Continuous>  dextrose 5%. 1000 milliLiter(s) (100 mL/Hr) IV Continuous <Continuous>  dextrose 50% Injectable 12.5 Gram(s) IV Push once  dextrose 50% Injectable 25 Gram(s) IV Push once  dextrose 50% Injectable 25 Gram(s) IV Push once  folic acid 1 milliGRAM(s) Oral daily  gabapentin 300 milliGRAM(s) Oral three times a day  glucagon  Injectable 1 milliGRAM(s) IntraMuscular once  lactobacillus acidophilus 1 Tablet(s) Oral two times a day with meals  lidocaine   4% Patch 2 Patch Transdermal <User Schedule>  lidocaine   4% Patch 2 Patch Transdermal once  lidocaine   4% Patch 2 Patch Transdermal <User Schedule>  melatonin 6 milliGRAM(s) Oral at bedtime  methylPREDNISolone 36 milliGRAM(s) Oral daily  metoprolol tartrate 12.5 milliGRAM(s) Oral two times a day  multivitamin 1 Tablet(s) Oral daily  nystatin Powder 1 Application(s) Topical two times a day  pantoprazole    Tablet 40 milliGRAM(s) Oral before breakfast  polyethylene glycol 3350 17 Gram(s) Oral <User Schedule>  sodium chloride 0.9% lock flush 5 milliLiter(s) IV Push two times a day  zinc oxide 40% Paste 1 Application(s) Topical three times a day    MEDICATIONS  (PRN):  albuterol/ipratropium for Nebulization 3 milliLiter(s) Nebulizer every 6 hours PRN Shortness of Breath and/or Wheezing  ALPRAZolam 0.25 milliGRAM(s) Oral every 6 hours PRN Anxiety  aluminum hydroxide/magnesium hydroxide/simethicone Suspension 30 milliLiter(s) Oral every 4 hours PRN Dyspepsia  ammonium lactate 12% Lotion 1 Application(s) Topical every 12 hours PRN BL feet dryness  benzocaine/menthol Lozenge 1 Lozenge Oral two times a day PRN Sore Throat  benzonatate 100 milliGRAM(s) Oral three times a day PRN Cough  bisacodyl 5 milliGRAM(s) Oral daily PRN Constipation  dextrose Oral Gel 15 Gram(s) Oral once PRN Blood Glucose LESS THAN 70 milliGRAM(s)/deciliter  hydrocortisone hemorrhoidal Suppository 1 Suppository(s) Rectal two times a day PRN Hemorrhoids  loperamide 2 milliGRAM(s) Oral three times a day PRN Diarrhea  SIMETHICONE SOFTGELS 250 milliGRAM(s),SIMETHICONE SOFTGELS 250 milliGRAM(s) 250 milliGRAM(s) Oral three times a day PRN for gas  sodium chloride 0.65% Nasal 1 Spray(s) Both Nostrils every 8 hours PRN Nasal Congestion      SUBJECTIVE/ROS:  Patient seen and examined at bedside this AM.  Admits to sleeping well.  Still experiencing R mandible and occipital numbness, with associated hair loss- plan for Rheumatology follow up outpatient.   Experiencing BL deltoid and shoulder, and R lower back discomfort post assisted fall yesterday.     ROS--No chest or abd pain, no palpitations nausea or vomiting  Tingling/numbness as noted  LB 11/30    Therapy.  Observed today during therapy--sit to stand with mod assist x 2, but ambulated with walker 20 ft, improvement with balance noted (improvement from 15 ft yesterday)  Continues to ambulate functional distance with manual WC, without assistance  Sustained improvement with slide board transfer bed to   Sustained improvement of upper extremity function and strength      Vital Signs Last 24 Hrs  T(C): 36.9 (01 Dec 2023 09:37), Max: 37 (30 Nov 2023 20:00)  T(F): 98.5 (01 Dec 2023 09:37), Max: 98.6 (30 Nov 2023 20:00)  HR: 83 (01 Dec 2023 09:37) (83 - 84)  BP: 107/66 (01 Dec 2023 09:37) (107/66 - 108/69)  RR: 16 (01 Dec 2023 09:37) (16 - 16)  SpO2: 92% (01 Dec 2023 09:37) (91% - 92%)      PHYSICAL EXAM:  Gen -  Comfortable,  at bedside -normal oxy sat and subsequently self ambulating   Pulm - clear  Cardiovascular - HS i and II intermittently irregular  Abdomen - Soft, non tender,   Extremities - edema to b/l LE, no calf tenderness, LUE Med line    Neuro-  Cognitive - awake, alert, fully oriented,  Light tough sensation diffusely reduced on fingers and dorsal foot, and over right lower jaw, localized area approx 2 cm, no skin changes noted, non progressive   Motor -                    LEFT    UE - 4/5                    RIGHT UE - 4/5                     LEFT    LE - 3+/5, distally and 3/5 proximal                    RIGHT LE - Ankles PF 3/5 ,DF 2/5, Knee ext 3/5 Hips limited by pain Rt hip due to ulcer at ECHO access site   Sensory - decreased light tough sensation fingers and sole of foot, difusely  MSK: improvement with motor strength  Psychiatric - Mood stable, Affect WNL  Skin -- , stage 2 pressure injury to right buttock 4 x1 and left buttock 3x1, stage 2 pressure injury ti right inner thigh 0.2 x 0.2, right groin wound 10.5 x 2.0, Left groin wound 0.6x1.8.0.1cm  Mild discomfort on pressure over Rt inguinal ligament, but no swelling or erythema  MMT--Able to contract B/L upper back muscles, EF/EE 4/5 distally, knee Est 3+/5      LABS:                        11.0   14.60 )-----------( 315      ( 28 Nov 2023 16:29 )             35.3     11-28    137  |  102  |  25<H>  ----------------------------<  205<H>  4.2   |  26  |  0.66    Ca    9.2      28 Nov 2023 16:29    TPro  6.0  /  Alb  3.1<L>  /  TBili  0.8  /  DBili  x   /  AST  57<H>  /  ALT  93<H>  /  AlkPhos  227<H>  11-28        MEDICATIONS  (STANDING):  apixaban 5 milliGRAM(s) Oral every 12 hours  artificial  tears Solution 1 Drop(s) Both EYES every 12 hours  ascorbic acid 500 milliGRAM(s) Oral daily  atovaquone  Suspension 1500 milliGRAM(s) Oral daily  dextrose 5%. 1000 milliLiter(s) (50 mL/Hr) IV Continuous <Continuous>  dextrose 5%. 1000 milliLiter(s) (100 mL/Hr) IV Continuous <Continuous>  dextrose 50% Injectable 12.5 Gram(s) IV Push once  dextrose 50% Injectable 25 Gram(s) IV Push once  dextrose 50% Injectable 25 Gram(s) IV Push once  folic acid 1 milliGRAM(s) Oral daily  gabapentin 300 milliGRAM(s) Oral three times a day  glucagon  Injectable 1 milliGRAM(s) IntraMuscular once  lactobacillus acidophilus 1 Tablet(s) Oral two times a day with meals  lidocaine   4% Patch 2 Patch Transdermal <User Schedule>  lidocaine   4% Patch 2 Patch Transdermal once  lidocaine   4% Patch 2 Patch Transdermal <User Schedule>  melatonin 6 milliGRAM(s) Oral at bedtime  methylPREDNISolone 36 milliGRAM(s) Oral daily  metoprolol tartrate 12.5 milliGRAM(s) Oral two times a day  multivitamin 1 Tablet(s) Oral daily  nystatin Powder 1 Application(s) Topical two times a day  pantoprazole    Tablet 40 milliGRAM(s) Oral before breakfast  polyethylene glycol 3350 17 Gram(s) Oral <User Schedule>  sodium chloride 0.9% lock flush 5 milliLiter(s) IV Push two times a day  zinc oxide 40% Paste 1 Application(s) Topical three times a day    MEDICATIONS  (PRN):  albuterol/ipratropium for Nebulization 3 milliLiter(s) Nebulizer every 6 hours PRN Shortness of Breath and/or Wheezing  ALPRAZolam 0.25 milliGRAM(s) Oral every 6 hours PRN Anxiety  aluminum hydroxide/magnesium hydroxide/simethicone Suspension 30 milliLiter(s) Oral every 4 hours PRN Dyspepsia  ammonium lactate 12% Lotion 1 Application(s) Topical every 12 hours PRN BL feet dryness  benzocaine/menthol Lozenge 1 Lozenge Oral two times a day PRN Sore Throat  benzonatate 100 milliGRAM(s) Oral three times a day PRN Cough  bisacodyl 5 milliGRAM(s) Oral daily PRN Constipation  dextrose Oral Gel 15 Gram(s) Oral once PRN Blood Glucose LESS THAN 70 milliGRAM(s)/deciliter  hydrocortisone hemorrhoidal Suppository 1 Suppository(s) Rectal two times a day PRN Hemorrhoids  loperamide 2 milliGRAM(s) Oral three times a day PRN Diarrhea  SIMETHICONE SOFTGELS 250 milliGRAM(s),SIMETHICONE SOFTGELS 250 milliGRAM(s) 250 milliGRAM(s) Oral three times a day PRN for gas  sodium chloride 0.65% Nasal 1 Spray(s) Both Nostrils every 8 hours PRN Nasal Congestion

## 2023-12-02 PROCEDURE — 99232 SBSQ HOSP IP/OBS MODERATE 35: CPT

## 2023-12-02 RX ADMIN — APIXABAN 5 MILLIGRAM(S): 2.5 TABLET, FILM COATED ORAL at 22:55

## 2023-12-02 RX ADMIN — GABAPENTIN 300 MILLIGRAM(S): 400 CAPSULE ORAL at 09:40

## 2023-12-02 RX ADMIN — GABAPENTIN 300 MILLIGRAM(S): 400 CAPSULE ORAL at 22:54

## 2023-12-02 RX ADMIN — Medication 1 DROP(S): at 22:57

## 2023-12-02 RX ADMIN — APIXABAN 5 MILLIGRAM(S): 2.5 TABLET, FILM COATED ORAL at 09:40

## 2023-12-02 RX ADMIN — Medication 1 TABLET(S): at 09:40

## 2023-12-02 RX ADMIN — ATOVAQUONE 1500 MILLIGRAM(S): 750 SUSPENSION ORAL at 22:56

## 2023-12-02 RX ADMIN — GABAPENTIN 300 MILLIGRAM(S): 400 CAPSULE ORAL at 15:51

## 2023-12-02 RX ADMIN — Medication 1 MILLIGRAM(S): at 22:54

## 2023-12-02 RX ADMIN — PANTOPRAZOLE SODIUM 40 MILLIGRAM(S): 20 TABLET, DELAYED RELEASE ORAL at 09:40

## 2023-12-02 RX ADMIN — Medication 500 MILLIGRAM(S): at 22:54

## 2023-12-02 RX ADMIN — Medication 1 TABLET(S): at 22:55

## 2023-12-02 RX ADMIN — Medication 5 MILLIGRAM(S): at 06:55

## 2023-12-02 RX ADMIN — Medication 12.5 MILLIGRAM(S): at 09:40

## 2023-12-02 RX ADMIN — Medication 36 MILLIGRAM(S): at 09:40

## 2023-12-02 RX ADMIN — ZINC OXIDE 1 APPLICATION(S): 200 OINTMENT TOPICAL at 15:54

## 2023-12-02 RX ADMIN — Medication 12.5 MILLIGRAM(S): at 22:55

## 2023-12-02 NOTE — PROGRESS NOTE ADULT - SUBJECTIVE AND OBJECTIVE BOX
Patient is a 62y old  Female who presents with a chief complaint of Interstitial Lung Disease (01 Dec 2023 14:13)    Patient seen and examined at bedside. No acute overnight events. Soreness from fall the other day. Slept well.     ALLERGIES:  No Known Allergies    MEDICATIONS  (STANDING):  apixaban 5 milliGRAM(s) Oral every 12 hours  artificial  tears Solution 1 Drop(s) Both EYES every 12 hours  ascorbic acid 500 milliGRAM(s) Oral daily  atovaquone  Suspension 1500 milliGRAM(s) Oral daily  dextrose 5%. 1000 milliLiter(s) (100 mL/Hr) IV Continuous <Continuous>  dextrose 5%. 1000 milliLiter(s) (50 mL/Hr) IV Continuous <Continuous>  dextrose 50% Injectable 12.5 Gram(s) IV Push once  dextrose 50% Injectable 25 Gram(s) IV Push once  dextrose 50% Injectable 25 Gram(s) IV Push once  folic acid 1 milliGRAM(s) Oral daily  gabapentin 300 milliGRAM(s) Oral three times a day  glucagon  Injectable 1 milliGRAM(s) IntraMuscular once  lactobacillus acidophilus 1 Tablet(s) Oral two times a day with meals  lidocaine   4% Patch 2 Patch Transdermal <User Schedule>  lidocaine   4% Patch 1 Patch Transdermal <User Schedule>  melatonin 6 milliGRAM(s) Oral at bedtime  methylPREDNISolone 36 milliGRAM(s) Oral daily  metoprolol tartrate 12.5 milliGRAM(s) Oral two times a day  multivitamin 1 Tablet(s) Oral daily  nystatin Powder 1 Application(s) Topical two times a day  pantoprazole    Tablet 40 milliGRAM(s) Oral before breakfast  polyethylene glycol 3350 17 Gram(s) Oral <User Schedule>  sodium chloride 0.9% lock flush 5 milliLiter(s) IV Push two times a day  zinc oxide 40% Paste 1 Application(s) Topical three times a day    MEDICATIONS  (PRN):  albuterol/ipratropium for Nebulization 3 milliLiter(s) Nebulizer every 6 hours PRN Shortness of Breath and/or Wheezing  ALPRAZolam 0.25 milliGRAM(s) Oral every 6 hours PRN Anxiety  aluminum hydroxide/magnesium hydroxide/simethicone Suspension 30 milliLiter(s) Oral every 4 hours PRN Dyspepsia  ammonium lactate 12% Lotion 1 Application(s) Topical every 12 hours PRN BL feet dryness  baclofen 5 milliGRAM(s) Oral every 8 hours PRN Musculoskeletal Pain  benzocaine/menthol Lozenge 1 Lozenge Oral two times a day PRN Sore Throat  benzonatate 100 milliGRAM(s) Oral three times a day PRN Cough  bisacodyl 5 milliGRAM(s) Oral daily PRN Constipation  dextrose Oral Gel 15 Gram(s) Oral once PRN Blood Glucose LESS THAN 70 milliGRAM(s)/deciliter  hydrocortisone hemorrhoidal Suppository 1 Suppository(s) Rectal two times a day PRN Hemorrhoids  loperamide 2 milliGRAM(s) Oral three times a day PRN Diarrhea  SIMETHICONE SOFTGELS 250 milliGRAM(s),SIMETHICONE SOFTGELS 250 milliGRAM(s) 250 milliGRAM(s) Oral three times a day PRN for gas  sodium chloride 0.65% Nasal 1 Spray(s) Both Nostrils every 8 hours PRN Nasal Congestion    Vital Signs Last 24 Hrs  T(F): 98.1 (01 Dec 2023 21:30), Max: 98.1 (01 Dec 2023 21:30)  HR: 87 (01 Dec 2023 21:30) (87 - 87)  BP: 102/64 (01 Dec 2023 21:30) (102/64 - 102/64)  RR: 16 (01 Dec 2023 21:30) (16 - 16)  SpO2: 92% (01 Dec 2023 21:30) (92% - 92%)  I&O's Summary    PHYSICAL EXAM:  General: NAD, awake, alert  ENT: MMM, no tonsilar exudate  Neck: Supple, No JVD  Lungs: Clear to auscultation bilaterally, no wheezes. Good air entry bilaterally   Cardio: RRR, S1/S2, No murmurs  Abdomen: Soft, Nontender, Nondistended; Bowel sounds present  Extremities: No calf tenderness, No pitting edema    LABS:      09-25 Chol -- LDL -- HDL -- Trig 199 mg/dL    RADIOLOGY & ADDITIONAL TESTS:     Care Discussed with Consultants/Other Providers:

## 2023-12-02 NOTE — PROGRESS NOTE ADULT - SUBJECTIVE AND OBJECTIVE BOX
Chief complaint: no new complaints     Patient is a 62y old  Female who presents with a chief complaint of Interstitial Lung Disease (02 Dec 2023 10:15)        HEALTH ISSUES - PROBLEM Dx:  Epistaxis    Soft hoarse voice    Afib    Tinnitus    Excessive cerumen in ear canal    Lymphedema    Lung interstitial disease    Morbid obesity    Postnasal drip          PAST MEDICAL & SURGICAL HISTORY:  Afib      Sciatica      Interstitial lung disease      Lymphedema          VITALS  Vital Signs Last 24 Hrs  T(C): 36.8 (02 Dec 2023 09:30), Max: 36.8 (02 Dec 2023 09:30)  T(F): 98.2 (02 Dec 2023 09:30), Max: 98.2 (02 Dec 2023 09:30)  HR: 84 (02 Dec 2023 09:30) (84 - 87)  BP: 108/69 (02 Dec 2023 09:30) (102/64 - 108/69)  BP(mean): --  RR: 16 (02 Dec 2023 09:30) (16 - 16)  SpO2: 96% (02 Dec 2023 09:30) (92% - 96%)    Parameters below as of 02 Dec 2023 09:30  Patient On (Oxygen Delivery Method): room air          PHYSICAL EXAM  Constitutional - NAD, Comfortable  HEENT - NCAT, EOMI  Neck - Supple, No limited ROM  Chest - CTA bilaterally  Cardiovascular - RRR, S1S2  Abdomen - Soft, NTND  Extremities -  No calf tenderness                   CURRENT MEDICATIONS    MEDICATIONS  (STANDING):  apixaban 5 milliGRAM(s) Oral every 12 hours  artificial  tears Solution 1 Drop(s) Both EYES every 12 hours  ascorbic acid 500 milliGRAM(s) Oral daily  atovaquone  Suspension 1500 milliGRAM(s) Oral daily  dextrose 5%. 1000 milliLiter(s) (50 mL/Hr) IV Continuous <Continuous>  dextrose 5%. 1000 milliLiter(s) (100 mL/Hr) IV Continuous <Continuous>  dextrose 50% Injectable 12.5 Gram(s) IV Push once  dextrose 50% Injectable 25 Gram(s) IV Push once  dextrose 50% Injectable 25 Gram(s) IV Push once  folic acid 1 milliGRAM(s) Oral daily  gabapentin 300 milliGRAM(s) Oral three times a day  glucagon  Injectable 1 milliGRAM(s) IntraMuscular once  lactobacillus acidophilus 1 Tablet(s) Oral two times a day with meals  lidocaine   4% Patch 2 Patch Transdermal <User Schedule>  lidocaine   4% Patch 1 Patch Transdermal <User Schedule>  melatonin 6 milliGRAM(s) Oral at bedtime  methylPREDNISolone 36 milliGRAM(s) Oral daily  metoprolol tartrate 12.5 milliGRAM(s) Oral two times a day  multivitamin 1 Tablet(s) Oral daily  nystatin Powder 1 Application(s) Topical two times a day  pantoprazole    Tablet 40 milliGRAM(s) Oral before breakfast  polyethylene glycol 3350 17 Gram(s) Oral <User Schedule>  sodium chloride 0.9% lock flush 5 milliLiter(s) IV Push two times a day  zinc oxide 40% Paste 1 Application(s) Topical three times a day    MEDICATIONS  (PRN):  albuterol/ipratropium for Nebulization 3 milliLiter(s) Nebulizer every 6 hours PRN Shortness of Breath and/or Wheezing  ALPRAZolam 0.25 milliGRAM(s) Oral every 6 hours PRN Anxiety  aluminum hydroxide/magnesium hydroxide/simethicone Suspension 30 milliLiter(s) Oral every 4 hours PRN Dyspepsia  ammonium lactate 12% Lotion 1 Application(s) Topical every 12 hours PRN BL feet dryness  baclofen 5 milliGRAM(s) Oral every 8 hours PRN Musculoskeletal Pain  benzocaine/menthol Lozenge 1 Lozenge Oral two times a day PRN Sore Throat  benzonatate 100 milliGRAM(s) Oral three times a day PRN Cough  bisacodyl 5 milliGRAM(s) Oral daily PRN Constipation  dextrose Oral Gel 15 Gram(s) Oral once PRN Blood Glucose LESS THAN 70 milliGRAM(s)/deciliter  hydrocortisone hemorrhoidal Suppository 1 Suppository(s) Rectal two times a day PRN Hemorrhoids  loperamide 2 milliGRAM(s) Oral three times a day PRN Diarrhea  SIMETHICONE SOFTGELS 250 milliGRAM(s),SIMETHICONE SOFTGELS 250 milliGRAM(s) 250 milliGRAM(s) Oral three times a day PRN for gas  sodium chloride 0.65% Nasal 1 Spray(s) Both Nostrils every 8 hours PRN Nasal Congestion    ASSESSMENT & PLAN          GI/Bowel Management - Dulcolax PRN, Fleet PRN   Management - Toilet Q2  Skin - Turn Q2  Pain - Tylenol PRN  DVT PPX - Apixaban       Continue comprehensive acute rehab program consisting of 3hrs/day of OT/PT and SLP.

## 2023-12-03 PROCEDURE — 99232 SBSQ HOSP IP/OBS MODERATE 35: CPT

## 2023-12-03 RX ADMIN — ZINC OXIDE 1 APPLICATION(S): 200 OINTMENT TOPICAL at 15:59

## 2023-12-03 RX ADMIN — GABAPENTIN 300 MILLIGRAM(S): 400 CAPSULE ORAL at 08:44

## 2023-12-03 RX ADMIN — Medication 500 MILLIGRAM(S): at 20:40

## 2023-12-03 RX ADMIN — APIXABAN 5 MILLIGRAM(S): 2.5 TABLET, FILM COATED ORAL at 08:45

## 2023-12-03 RX ADMIN — GABAPENTIN 300 MILLIGRAM(S): 400 CAPSULE ORAL at 20:40

## 2023-12-03 RX ADMIN — ATOVAQUONE 1500 MILLIGRAM(S): 750 SUSPENSION ORAL at 20:43

## 2023-12-03 RX ADMIN — Medication 1 MILLIGRAM(S): at 20:41

## 2023-12-03 RX ADMIN — ZINC OXIDE 1 APPLICATION(S): 200 OINTMENT TOPICAL at 20:41

## 2023-12-03 RX ADMIN — Medication 1 DROP(S): at 20:42

## 2023-12-03 RX ADMIN — GABAPENTIN 300 MILLIGRAM(S): 400 CAPSULE ORAL at 16:14

## 2023-12-03 RX ADMIN — Medication 1 TABLET(S): at 20:43

## 2023-12-03 RX ADMIN — NYSTATIN CREAM 1 APPLICATION(S): 100000 CREAM TOPICAL at 20:41

## 2023-12-03 RX ADMIN — APIXABAN 5 MILLIGRAM(S): 2.5 TABLET, FILM COATED ORAL at 20:42

## 2023-12-03 RX ADMIN — ZINC OXIDE 1 APPLICATION(S): 200 OINTMENT TOPICAL at 08:39

## 2023-12-03 RX ADMIN — Medication 36 MILLIGRAM(S): at 08:44

## 2023-12-03 RX ADMIN — Medication 12.5 MILLIGRAM(S): at 20:41

## 2023-12-03 RX ADMIN — PANTOPRAZOLE SODIUM 40 MILLIGRAM(S): 20 TABLET, DELAYED RELEASE ORAL at 08:45

## 2023-12-03 RX ADMIN — Medication 12.5 MILLIGRAM(S): at 08:44

## 2023-12-03 NOTE — PROGRESS NOTE ADULT - SUBJECTIVE AND OBJECTIVE BOX
Chief complaint: no new complaints     Patient is a 62y old  Female who presents with a chief complaint of Interstitial Lung Disease (02 Dec 2023 13:22)      HEALTH ISSUES - PROBLEM Dx:  Epistaxis    Soft hoarse voice    Afib    Tinnitus    Excessive cerumen in ear canal    Lymphedema    Lung interstitial disease    Morbid obesity    Postnasal drip          PAST MEDICAL & SURGICAL HISTORY:  Afib      Sciatica      Interstitial lung disease      Lymphedema        VITALS  Vital Signs Last 24 Hrs  T(C): 36.4 (03 Dec 2023 08:46), Max: 37.1 (02 Dec 2023 19:25)  T(F): 97.5 (03 Dec 2023 08:46), Max: 98.8 (02 Dec 2023 19:25)  HR: 82 (03 Dec 2023 08:46) (81 - 82)  BP: 107/61 (03 Dec 2023 08:46) (107/61 - 115/71)  BP(mean): --  RR: 16 (03 Dec 2023 08:46) (16 - 16)  SpO2: 92% (03 Dec 2023 08:46) (92% - 94%)    Parameters below as of 03 Dec 2023 08:46  Patient On (Oxygen Delivery Method): room air          PHYSICAL EXAM  Constitutional - NAD, Comfortable  HEENT - NCAT, EOMI  Neck - Supple, No limited ROM  Chest - CTA bilaterally  Cardiovascular - RRR, S1S2  Abdomen -  Soft, NTND  Extremities - No calf tenderness     CURRENT MEDICATIONS    MEDICATIONS  (STANDING):  apixaban 5 milliGRAM(s) Oral every 12 hours  artificial  tears Solution 1 Drop(s) Both EYES every 12 hours  ascorbic acid 500 milliGRAM(s) Oral daily  atovaquone  Suspension 1500 milliGRAM(s) Oral daily  dextrose 5%. 1000 milliLiter(s) (50 mL/Hr) IV Continuous <Continuous>  dextrose 5%. 1000 milliLiter(s) (100 mL/Hr) IV Continuous <Continuous>  dextrose 50% Injectable 12.5 Gram(s) IV Push once  dextrose 50% Injectable 25 Gram(s) IV Push once  dextrose 50% Injectable 25 Gram(s) IV Push once  folic acid 1 milliGRAM(s) Oral daily  gabapentin 300 milliGRAM(s) Oral three times a day  glucagon  Injectable 1 milliGRAM(s) IntraMuscular once  lactobacillus acidophilus 1 Tablet(s) Oral two times a day with meals  lidocaine   4% Patch 2 Patch Transdermal <User Schedule>  lidocaine   4% Patch 1 Patch Transdermal <User Schedule>  melatonin 6 milliGRAM(s) Oral at bedtime  methylPREDNISolone 36 milliGRAM(s) Oral daily  metoprolol tartrate 12.5 milliGRAM(s) Oral two times a day  multivitamin 1 Tablet(s) Oral daily  nystatin Powder 1 Application(s) Topical two times a day  pantoprazole    Tablet 40 milliGRAM(s) Oral before breakfast  polyethylene glycol 3350 17 Gram(s) Oral <User Schedule>  sodium chloride 0.9% lock flush 5 milliLiter(s) IV Push two times a day  zinc oxide 40% Paste 1 Application(s) Topical three times a day    MEDICATIONS  (PRN):  albuterol/ipratropium for Nebulization 3 milliLiter(s) Nebulizer every 6 hours PRN Shortness of Breath and/or Wheezing  ALPRAZolam 0.25 milliGRAM(s) Oral every 6 hours PRN Anxiety  aluminum hydroxide/magnesium hydroxide/simethicone Suspension 30 milliLiter(s) Oral every 4 hours PRN Dyspepsia  ammonium lactate 12% Lotion 1 Application(s) Topical every 12 hours PRN BL feet dryness  baclofen 5 milliGRAM(s) Oral every 8 hours PRN Musculoskeletal Pain  benzocaine/menthol Lozenge 1 Lozenge Oral two times a day PRN Sore Throat  benzonatate 100 milliGRAM(s) Oral three times a day PRN Cough  bisacodyl 5 milliGRAM(s) Oral daily PRN Constipation  dextrose Oral Gel 15 Gram(s) Oral once PRN Blood Glucose LESS THAN 70 milliGRAM(s)/deciliter  hydrocortisone hemorrhoidal Suppository 1 Suppository(s) Rectal two times a day PRN Hemorrhoids  loperamide 2 milliGRAM(s) Oral three times a day PRN Diarrhea  SIMETHICONE SOFTGELS 250 milliGRAM(s),SIMETHICONE SOFTGELS 250 milliGRAM(s) 250 milliGRAM(s) Oral three times a day PRN for gas  sodium chloride 0.65% Nasal 1 Spray(s) Both Nostrils every 8 hours PRN Nasal Congestion    ASSESSMENT & PLAN          GI/Bowel Management    Management - Toilet Q2  Skin - Turn Q2  Pain - Tylenol PRN  DVT PPX - Apixaban       Continue comprehensive acute rehab program consisting of 3hrs/day of OT/PT and SLP.

## 2023-12-04 LAB
ALBUMIN SERPL ELPH-MCNC: 3.2 G/DL — LOW (ref 3.3–5)
ALBUMIN SERPL ELPH-MCNC: 3.2 G/DL — LOW (ref 3.3–5)
ALP SERPL-CCNC: 161 U/L — HIGH (ref 40–120)
ALP SERPL-CCNC: 161 U/L — HIGH (ref 40–120)
ALT FLD-CCNC: 63 U/L — HIGH (ref 10–45)
ALT FLD-CCNC: 63 U/L — HIGH (ref 10–45)
ANION GAP SERPL CALC-SCNC: 8 MMOL/L — SIGNIFICANT CHANGE UP (ref 5–17)
ANION GAP SERPL CALC-SCNC: 8 MMOL/L — SIGNIFICANT CHANGE UP (ref 5–17)
APPEARANCE UR: CLEAR — SIGNIFICANT CHANGE UP
APPEARANCE UR: CLEAR — SIGNIFICANT CHANGE UP
AST SERPL-CCNC: 28 U/L — SIGNIFICANT CHANGE UP (ref 10–40)
AST SERPL-CCNC: 28 U/L — SIGNIFICANT CHANGE UP (ref 10–40)
BACTERIA # UR AUTO: ABNORMAL /HPF
BACTERIA # UR AUTO: ABNORMAL /HPF
BASOPHILS # BLD AUTO: 0.01 K/UL — SIGNIFICANT CHANGE UP (ref 0–0.2)
BASOPHILS # BLD AUTO: 0.01 K/UL — SIGNIFICANT CHANGE UP (ref 0–0.2)
BASOPHILS NFR BLD AUTO: 0.1 % — SIGNIFICANT CHANGE UP (ref 0–2)
BASOPHILS NFR BLD AUTO: 0.1 % — SIGNIFICANT CHANGE UP (ref 0–2)
BILIRUB SERPL-MCNC: 0.7 MG/DL — SIGNIFICANT CHANGE UP (ref 0.2–1.2)
BILIRUB SERPL-MCNC: 0.7 MG/DL — SIGNIFICANT CHANGE UP (ref 0.2–1.2)
BILIRUB UR-MCNC: NEGATIVE — SIGNIFICANT CHANGE UP
BILIRUB UR-MCNC: NEGATIVE — SIGNIFICANT CHANGE UP
BUN SERPL-MCNC: 22 MG/DL — SIGNIFICANT CHANGE UP (ref 7–23)
BUN SERPL-MCNC: 22 MG/DL — SIGNIFICANT CHANGE UP (ref 7–23)
CALCIUM SERPL-MCNC: 9.5 MG/DL — SIGNIFICANT CHANGE UP (ref 8.4–10.5)
CALCIUM SERPL-MCNC: 9.5 MG/DL — SIGNIFICANT CHANGE UP (ref 8.4–10.5)
CHLORIDE SERPL-SCNC: 102 MMOL/L — SIGNIFICANT CHANGE UP (ref 96–108)
CHLORIDE SERPL-SCNC: 102 MMOL/L — SIGNIFICANT CHANGE UP (ref 96–108)
CO2 SERPL-SCNC: 28 MMOL/L — SIGNIFICANT CHANGE UP (ref 22–31)
CO2 SERPL-SCNC: 28 MMOL/L — SIGNIFICANT CHANGE UP (ref 22–31)
COD CRY URNS QL: PRESENT
COD CRY URNS QL: PRESENT
COLOR SPEC: SIGNIFICANT CHANGE UP
COLOR SPEC: SIGNIFICANT CHANGE UP
CREAT SERPL-MCNC: 0.76 MG/DL — SIGNIFICANT CHANGE UP (ref 0.5–1.3)
CREAT SERPL-MCNC: 0.76 MG/DL — SIGNIFICANT CHANGE UP (ref 0.5–1.3)
DIFF PNL FLD: NEGATIVE — SIGNIFICANT CHANGE UP
DIFF PNL FLD: NEGATIVE — SIGNIFICANT CHANGE UP
EGFR: 87 ML/MIN/1.73M2 — SIGNIFICANT CHANGE UP
EGFR: 87 ML/MIN/1.73M2 — SIGNIFICANT CHANGE UP
EOSINOPHIL # BLD AUTO: 0 K/UL — SIGNIFICANT CHANGE UP (ref 0–0.5)
EOSINOPHIL # BLD AUTO: 0 K/UL — SIGNIFICANT CHANGE UP (ref 0–0.5)
EOSINOPHIL NFR BLD AUTO: 0 % — SIGNIFICANT CHANGE UP (ref 0–6)
EOSINOPHIL NFR BLD AUTO: 0 % — SIGNIFICANT CHANGE UP (ref 0–6)
EPI CELLS # UR: 2 — SIGNIFICANT CHANGE UP
EPI CELLS # UR: 2 — SIGNIFICANT CHANGE UP
GLUCOSE SERPL-MCNC: 164 MG/DL — HIGH (ref 70–99)
GLUCOSE SERPL-MCNC: 164 MG/DL — HIGH (ref 70–99)
GLUCOSE UR QL: NEGATIVE MG/DL — SIGNIFICANT CHANGE UP
GLUCOSE UR QL: NEGATIVE MG/DL — SIGNIFICANT CHANGE UP
HCT VFR BLD CALC: 36.8 % — SIGNIFICANT CHANGE UP (ref 34.5–45)
HCT VFR BLD CALC: 36.8 % — SIGNIFICANT CHANGE UP (ref 34.5–45)
HGB BLD-MCNC: 11.2 G/DL — LOW (ref 11.5–15.5)
HGB BLD-MCNC: 11.2 G/DL — LOW (ref 11.5–15.5)
IMM GRANULOCYTES NFR BLD AUTO: 0.9 % — SIGNIFICANT CHANGE UP (ref 0–0.9)
IMM GRANULOCYTES NFR BLD AUTO: 0.9 % — SIGNIFICANT CHANGE UP (ref 0–0.9)
KETONES UR-MCNC: NEGATIVE MG/DL — SIGNIFICANT CHANGE UP
KETONES UR-MCNC: NEGATIVE MG/DL — SIGNIFICANT CHANGE UP
LEUKOCYTE ESTERASE UR-ACNC: ABNORMAL
LEUKOCYTE ESTERASE UR-ACNC: ABNORMAL
LYMPHOCYTES # BLD AUTO: 0.13 K/UL — LOW (ref 1–3.3)
LYMPHOCYTES # BLD AUTO: 0.13 K/UL — LOW (ref 1–3.3)
LYMPHOCYTES # BLD AUTO: 1.5 % — LOW (ref 13–44)
LYMPHOCYTES # BLD AUTO: 1.5 % — LOW (ref 13–44)
MCHC RBC-ENTMCNC: 26.9 PG — LOW (ref 27–34)
MCHC RBC-ENTMCNC: 26.9 PG — LOW (ref 27–34)
MCHC RBC-ENTMCNC: 30.4 GM/DL — LOW (ref 32–36)
MCHC RBC-ENTMCNC: 30.4 GM/DL — LOW (ref 32–36)
MCV RBC AUTO: 88.2 FL — SIGNIFICANT CHANGE UP (ref 80–100)
MCV RBC AUTO: 88.2 FL — SIGNIFICANT CHANGE UP (ref 80–100)
MONOCYTES # BLD AUTO: 0.16 K/UL — SIGNIFICANT CHANGE UP (ref 0–0.9)
MONOCYTES # BLD AUTO: 0.16 K/UL — SIGNIFICANT CHANGE UP (ref 0–0.9)
MONOCYTES NFR BLD AUTO: 1.9 % — LOW (ref 2–14)
MONOCYTES NFR BLD AUTO: 1.9 % — LOW (ref 2–14)
NEUTROPHILS # BLD AUTO: 8.11 K/UL — HIGH (ref 1.8–7.4)
NEUTROPHILS # BLD AUTO: 8.11 K/UL — HIGH (ref 1.8–7.4)
NEUTROPHILS NFR BLD AUTO: 95.6 % — HIGH (ref 43–77)
NEUTROPHILS NFR BLD AUTO: 95.6 % — HIGH (ref 43–77)
NITRITE UR-MCNC: NEGATIVE — SIGNIFICANT CHANGE UP
NITRITE UR-MCNC: NEGATIVE — SIGNIFICANT CHANGE UP
NRBC # BLD: 0 /100 WBCS — SIGNIFICANT CHANGE UP (ref 0–0)
NRBC # BLD: 0 /100 WBCS — SIGNIFICANT CHANGE UP (ref 0–0)
PH UR: 6 — SIGNIFICANT CHANGE UP (ref 5–8)
PH UR: 6 — SIGNIFICANT CHANGE UP (ref 5–8)
PLATELET # BLD AUTO: 303 K/UL — SIGNIFICANT CHANGE UP (ref 150–400)
PLATELET # BLD AUTO: 303 K/UL — SIGNIFICANT CHANGE UP (ref 150–400)
POTASSIUM SERPL-MCNC: 4.8 MMOL/L — SIGNIFICANT CHANGE UP (ref 3.5–5.3)
POTASSIUM SERPL-MCNC: 4.8 MMOL/L — SIGNIFICANT CHANGE UP (ref 3.5–5.3)
POTASSIUM SERPL-SCNC: 4.8 MMOL/L — SIGNIFICANT CHANGE UP (ref 3.5–5.3)
POTASSIUM SERPL-SCNC: 4.8 MMOL/L — SIGNIFICANT CHANGE UP (ref 3.5–5.3)
PROT SERPL-MCNC: 6.1 G/DL — SIGNIFICANT CHANGE UP (ref 6–8.3)
PROT SERPL-MCNC: 6.1 G/DL — SIGNIFICANT CHANGE UP (ref 6–8.3)
PROT UR-MCNC: NEGATIVE MG/DL — SIGNIFICANT CHANGE UP
PROT UR-MCNC: NEGATIVE MG/DL — SIGNIFICANT CHANGE UP
RBC # BLD: 4.17 M/UL — SIGNIFICANT CHANGE UP (ref 3.8–5.2)
RBC # BLD: 4.17 M/UL — SIGNIFICANT CHANGE UP (ref 3.8–5.2)
RBC # FLD: 15.3 % — HIGH (ref 10.3–14.5)
RBC # FLD: 15.3 % — HIGH (ref 10.3–14.5)
RBC CASTS # UR COMP ASSIST: 4 /HPF — SIGNIFICANT CHANGE UP (ref 0–4)
RBC CASTS # UR COMP ASSIST: 4 /HPF — SIGNIFICANT CHANGE UP (ref 0–4)
SODIUM SERPL-SCNC: 138 MMOL/L — SIGNIFICANT CHANGE UP (ref 135–145)
SODIUM SERPL-SCNC: 138 MMOL/L — SIGNIFICANT CHANGE UP (ref 135–145)
SP GR SPEC: 1.02 — SIGNIFICANT CHANGE UP (ref 1–1.03)
SP GR SPEC: 1.02 — SIGNIFICANT CHANGE UP (ref 1–1.03)
UROBILINOGEN FLD QL: 1 MG/DL — SIGNIFICANT CHANGE UP (ref 0.2–1)
UROBILINOGEN FLD QL: 1 MG/DL — SIGNIFICANT CHANGE UP (ref 0.2–1)
WBC # BLD: 8.49 K/UL — SIGNIFICANT CHANGE UP (ref 3.8–10.5)
WBC # BLD: 8.49 K/UL — SIGNIFICANT CHANGE UP (ref 3.8–10.5)
WBC # FLD AUTO: 8.49 K/UL — SIGNIFICANT CHANGE UP (ref 3.8–10.5)
WBC # FLD AUTO: 8.49 K/UL — SIGNIFICANT CHANGE UP (ref 3.8–10.5)
WBC UR QL: 14 /HPF — HIGH (ref 0–5)
WBC UR QL: 14 /HPF — HIGH (ref 0–5)

## 2023-12-04 PROCEDURE — 99232 SBSQ HOSP IP/OBS MODERATE 35: CPT | Mod: GC

## 2023-12-04 RX ORDER — ALPRAZOLAM 0.25 MG
0.25 TABLET ORAL EVERY 6 HOURS
Refills: 0 | Status: DISCONTINUED | OUTPATIENT
Start: 2023-12-04 | End: 2023-12-08

## 2023-12-04 RX ORDER — ACETAMINOPHEN 500 MG
650 TABLET ORAL EVERY 6 HOURS
Refills: 0 | Status: DISCONTINUED | OUTPATIENT
Start: 2023-12-04 | End: 2023-12-05

## 2023-12-04 RX ORDER — ACETAMINOPHEN 500 MG
975 TABLET ORAL ONCE
Refills: 0 | Status: COMPLETED | OUTPATIENT
Start: 2023-12-04 | End: 2023-12-04

## 2023-12-04 RX ADMIN — ZINC OXIDE 1 APPLICATION(S): 200 OINTMENT TOPICAL at 05:33

## 2023-12-04 RX ADMIN — ATOVAQUONE 1500 MILLIGRAM(S): 750 SUSPENSION ORAL at 21:04

## 2023-12-04 RX ADMIN — LIDOCAINE 1 PATCH: 4 CREAM TOPICAL at 08:48

## 2023-12-04 RX ADMIN — Medication 36 MILLIGRAM(S): at 08:46

## 2023-12-04 RX ADMIN — ZINC OXIDE 1 APPLICATION(S): 200 OINTMENT TOPICAL at 08:47

## 2023-12-04 RX ADMIN — GABAPENTIN 300 MILLIGRAM(S): 400 CAPSULE ORAL at 16:10

## 2023-12-04 RX ADMIN — LIDOCAINE 1 PATCH: 4 CREAM TOPICAL at 21:19

## 2023-12-04 RX ADMIN — Medication 1 TABLET(S): at 21:05

## 2023-12-04 RX ADMIN — LIDOCAINE 2 PATCH: 4 CREAM TOPICAL at 21:19

## 2023-12-04 RX ADMIN — ZINC OXIDE 1 APPLICATION(S): 200 OINTMENT TOPICAL at 13:49

## 2023-12-04 RX ADMIN — Medication 1 TABLET(S): at 16:13

## 2023-12-04 RX ADMIN — Medication 500 MILLIGRAM(S): at 21:05

## 2023-12-04 RX ADMIN — APIXABAN 5 MILLIGRAM(S): 2.5 TABLET, FILM COATED ORAL at 08:46

## 2023-12-04 RX ADMIN — Medication 12.5 MILLIGRAM(S): at 21:04

## 2023-12-04 RX ADMIN — NYSTATIN CREAM 1 APPLICATION(S): 100000 CREAM TOPICAL at 21:07

## 2023-12-04 RX ADMIN — APIXABAN 5 MILLIGRAM(S): 2.5 TABLET, FILM COATED ORAL at 21:06

## 2023-12-04 RX ADMIN — Medication 975 MILLIGRAM(S): at 22:37

## 2023-12-04 RX ADMIN — Medication 1 TABLET(S): at 08:45

## 2023-12-04 RX ADMIN — NYSTATIN CREAM 1 APPLICATION(S): 100000 CREAM TOPICAL at 05:32

## 2023-12-04 RX ADMIN — PANTOPRAZOLE SODIUM 40 MILLIGRAM(S): 20 TABLET, DELAYED RELEASE ORAL at 08:47

## 2023-12-04 RX ADMIN — GABAPENTIN 300 MILLIGRAM(S): 400 CAPSULE ORAL at 21:05

## 2023-12-04 RX ADMIN — NYSTATIN CREAM 1 APPLICATION(S): 100000 CREAM TOPICAL at 08:46

## 2023-12-04 RX ADMIN — GABAPENTIN 300 MILLIGRAM(S): 400 CAPSULE ORAL at 08:46

## 2023-12-04 RX ADMIN — Medication 1 DROP(S): at 08:46

## 2023-12-04 RX ADMIN — Medication 1 DROP(S): at 21:06

## 2023-12-04 RX ADMIN — ZINC OXIDE 1 APPLICATION(S): 200 OINTMENT TOPICAL at 21:06

## 2023-12-04 RX ADMIN — Medication 975 MILLIGRAM(S): at 23:35

## 2023-12-04 RX ADMIN — Medication 1 MILLIGRAM(S): at 21:05

## 2023-12-04 RX ADMIN — Medication 5 MILLIGRAM(S): at 21:27

## 2023-12-04 NOTE — CHART NOTE - NSCHARTNOTEFT_GEN_A_CORE
Nutrition Follow Up Note  Source: Medical Record [X] Patient [ ] Family [x] pt's partner provided info         Diet: Regular  Pt tolerating diet with good PO intake, eating >75% of meals per nursing flow sheets. Pt was receiving Glucerna 1x/day but now has 6 in her fridge, therefore would like to hold off on receiving additional Glucerna for the next ~6 days. Will add Glucerna back to diet order at that point, per discussion with pt's partner. Pt continues to have multiple bowel movements a day. No other digestive complaints reported.    Enteral/Parenteral Nutrition: N/A    Current Weight: 256.6 lbs (10-5)    Pertinent Medications: MEDICATIONS  (STANDING):  apixaban 5 milliGRAM(s) Oral every 12 hours  artificial  tears Solution 1 Drop(s) Both EYES every 12 hours  ascorbic acid 500 milliGRAM(s) Oral daily  atovaquone  Suspension 1500 milliGRAM(s) Oral daily  dextrose 5%. 1000 milliLiter(s) (100 mL/Hr) IV Continuous <Continuous>  dextrose 5%. 1000 milliLiter(s) (50 mL/Hr) IV Continuous <Continuous>  dextrose 50% Injectable 12.5 Gram(s) IV Push once  dextrose 50% Injectable 25 Gram(s) IV Push once  dextrose 50% Injectable 25 Gram(s) IV Push once  folic acid 1 milliGRAM(s) Oral daily  gabapentin 300 milliGRAM(s) Oral three times a day  glucagon  Injectable 1 milliGRAM(s) IntraMuscular once  lactobacillus acidophilus 1 Tablet(s) Oral two times a day with meals  lidocaine   4% Patch 2 Patch Transdermal <User Schedule>  lidocaine   4% Patch 1 Patch Transdermal <User Schedule>  melatonin 6 milliGRAM(s) Oral at bedtime  methylPREDNISolone 36 milliGRAM(s) Oral daily  metoprolol tartrate 12.5 milliGRAM(s) Oral two times a day  multivitamin 1 Tablet(s) Oral daily  nystatin Powder 1 Application(s) Topical two times a day  pantoprazole    Tablet 40 milliGRAM(s) Oral before breakfast  polyethylene glycol 3350 17 Gram(s) Oral <User Schedule>  sodium chloride 0.9% lock flush 5 milliLiter(s) IV Push two times a day  trimethoprim  160 mG/sulfamethoxazole 800 mG 1 Tablet(s) Oral two times a day  zinc oxide 40% Paste 1 Application(s) Topical three times a day    MEDICATIONS  (PRN):  albuterol/ipratropium for Nebulization 3 milliLiter(s) Nebulizer every 6 hours PRN Shortness of Breath and/or Wheezing  ALPRAZolam 0.25 milliGRAM(s) Oral every 6 hours PRN Anxiety  aluminum hydroxide/magnesium hydroxide/simethicone Suspension 30 milliLiter(s) Oral every 4 hours PRN Dyspepsia  ammonium lactate 12% Lotion 1 Application(s) Topical every 12 hours PRN BL feet dryness  baclofen 5 milliGRAM(s) Oral every 8 hours PRN Musculoskeletal Pain  benzocaine/menthol Lozenge 1 Lozenge Oral two times a day PRN Sore Throat  benzonatate 100 milliGRAM(s) Oral three times a day PRN Cough  bisacodyl 5 milliGRAM(s) Oral daily PRN Constipation  dextrose Oral Gel 15 Gram(s) Oral once PRN Blood Glucose LESS THAN 70 milliGRAM(s)/deciliter  hydrocortisone hemorrhoidal Suppository 1 Suppository(s) Rectal two times a day PRN Hemorrhoids  loperamide 2 milliGRAM(s) Oral three times a day PRN Diarrhea  SIMETHICONE SOFTGELS 250 milliGRAM(s),SIMETHICONE SOFTGELS 250 milliGRAM(s) 250 milliGRAM(s) Oral three times a day PRN for gas  sodium chloride 0.65% Nasal 1 Spray(s) Both Nostrils every 8 hours PRN Nasal Congestion      Pertinent Labs:   11-28 Alb 3.1 g/dL<L>        Skin: R groin wound Per nursing flowsheets     Edema: No edema per nursing flow sheets     Last BM: on 12-3 Per nursing flowsheets     Estimated Needs:   [X] No Change since Previous Assessment  [ ] Recalculated:     Previous Nutrition Diagnosis:   Increased nutrient needs    Nutrition Diagnosis is [X] Ongoing  - continues on MVI, vitamin C.   Will add Glucerna supplement (provides 220 kcal, 10 g protein) back to diet order at next visit if desired.     New Nutrition Diagnosis: [X] Not Applicable  [ ] Inadequate Protein Energy Intake   [ ] Inadequate Oral Intake   [ ] Excessive Energy Intake   [ ] Increased Nutrient Needs   [ ] Obesity   [ ] Altered GI Function   [ ] Unintended Weight Loss   [ ] Food & Nutrition Related Knowledge Deficit  [ ] Limited Adherence to nutrition related recommendations   [ ] Malnutrition      Interventions:   1. Recommend continuing with current plan of care    Monitoring & Evaluation:   [X] Weights   [X] PO Intake   [X] Follow Up (Per Protocol)  [X] Tolerance to Diet Prescription   [X] Other: Labs    RD Remains Available.  Desire Livingston, RD

## 2023-12-04 NOTE — PROGRESS NOTE ADULT - ASSESSMENT
Assessment/Plan:  ALIZA FOLEY is a 62 year old female with PMH of pAFIB and sciatica; who presented to [facility] ED with SOB, and was found to have groundglass opacities and interstitial lung disease. She was treated with empiric Vancomycin and Zosyn. She underwent a bronchoscopy with bronchoalveolar lavage and pulse steroids. She was given IV diuretics for increased pulmonary congestion, but her respiratory status continued to worsen.  On 9/13, she was transferred to [facility] for ECMO requirements. She was further treated with Plasmapheresis, Rituximab and high-dose steroids, with significant improvement. Her overall hospital course was complicated by thrombocytopenia (s/p several platelet transfusions), leukocytosis (infectious workup entirely negative- s/p Vanco and Ceftriaxone), AFIB with RVR (resolved with Metoprolol), dysphagia (requiring NGT), transaminitis (secondary to acute illness and hypotension),  non-occlusive RIJ DVT (started on Lovenox). Patient now admitted for a multidisciplinary rehab program. 10-05-23 @ 13:18    * Sustained functional improvement including progress with Wt bearing LE and improvement with ability at transfers, concentrate on prox LE muscle strengthening  * Making further progress, increased ambulatory distance with walker up to 20 ft  * Urinary frequency- UA with moderate Leuks- started on Bactrim- await UCx     #Interstitial Lung Disease and Mixed connective tissue disease  - Gait Instability, ADL impairments and Functional impairments: start Comprehensive Rehab Program of PT/OT/SLP - 3 hours a day, 5 days a week  - P&O as needed   - Non-specific Interstitial Lung Disease  - Requiring ECMO   - Improvement s/p Plasmapheresis, Rituximab and high- dose steroids   - Albuterol/Ipratropium Nebulizer every 6 hours  - Mepron 1500mg daily  - PO Medrol ( due to liver dysfunction): Plan will be to taper by ~20% every 2 weeks    Medrol   64mg x 2 weeks   56mg x 2 weeks  44mg x 2 weeks   36mg x 2 weeks - Follow up Outpatient   - Plan to wean 02 as tolerated, Continue tapering oxygen,  -  CT Chest noted 11/10---Resp f/u review  11/14, appreciated  They reports sustained improvement, clinical (normal oxy sats on R/A, sustained progress with oxy tapering), and radiological (no acute findings on recent CT Chest, rather residual chronic infiltrative diesease noted, with recommendation to repeat CT after Acute rehab treatment     #Knee buckling  - 12/1: S/p guided fall with physical therapy  - Back muscle strain with catching self on RW   - Lidocaine patch to back and BL deltoids  - Ibuprofen 400mg x1 dose     #Experiencing R mandible and occipital numbness, with associated hair loss- outpatient follow up with Rheumatology    #Posttnasal drip--ENT review 11/7, declined scope, continue flonase     #pAFIB/Rt Ij thrombus  - Metoprolol 25mg BID and lovenox  --- Eliquis 5mg BID - tolerating well     #Chronic Tinnitus   - ENT review and recs appreciate, Patient already made ENT appointment for f/u    #Lymphedema both legs -chronic and Rt IJ thrombus  - ACE Wrap BL LEs  - BL LE doppler negative for DVT  - BL UE doppler with chronic thrombotic changes in RIJ     #Transaminitis --LFT Down trending   - Secondary to acute illness and hypotension at [facility]  - Outpatient follow up     #Leucocytosis--steroid related, continue monitoring     #Neuropathy  - Gabapentin 300 mg BID, will consider increasing dose, increase to TID 11/10  --Work on motor strengthening, priority for UE as preferred by patient   - Vit b12 >2,000 in 9/2023  --Will consider Rhuematology f/u if needed    #Sleep/Mood  - Melatonin 6mg at HS  - Psych rec Xanax 0.25mg PRN    #Skin  - Skin: Left lower abdomen ruptured blister 3.0 x 1.0, stage 2 pressure injury to right buttock 4 x1 and left buttock 3x1, stage 2 pressure injury ti right inner thigh 0.2 x 0.2, right groin wound 10.5 x 2.0,   Wound care review, Left groin wound 11/15--- 0.6x1.8.0.1cm, no slough, healthy base  -- MAD to skin folds- Nystatin to submammary and abdominal pannus BID  -- MAD to L groin- cleanse with NS, Triad cream daily  -- Mad to R groin- Nystatin powder daily   -- R groin wound-- aquacel packing, Triad to periwound, Allevyn foam- daily and PRN   - Pressure injury/Skin: OOB to Chair, PT/OT   - Offload pressure, low air loss support surfaces, T&P every 2 hours   --Wound care consult-10/9 -Multiple wounds--perineal and buttock/sacral, recs appreciated   --Suggest Continue treatments as below:   Twice daily & PRN Normal Saline Cleanse of abdominal pannus & breast folds, perineal, Left & Right inner buttock erosions.  Pat thoroughly dry.   Apply Desitin generously twice daily (& PRN) to perineal, buttocks, and left groin erosions, leave open to air.    Apply Nystatin powder to abdominal pannus and breast folds.  Suggest use of Interdry cloths in folds to 'wick' moisture.  Suggest daily Normal Saline Cleanse of right groin surgical wound, pat dry.  Apply Cavillon film barrier to intact periwound skin.  Place Aquacell strip over open area, cover with foam dressing.  - Wound care following     #Pain Mgmt   - Tylenol PRN   - Gabapentin 300mg at HS     #GI/Bowel Mgmt/Hx of alternating bowel movements  - Pantoprazole  - PRN: Maalox   --Lactobacillus BID     #/Bladder Mgmt   -Spontaneously voiding   - UA (11/3) negative  - 12/4: Urinary frequency- UA with moderate Leuks- started on Bactrim- await UCx     #FEN   #Dysphagia  - Diet - Minced & Moist  [CC]    - Dysphagia- SLP evaluation     #Health maintenance  - Folic Acid   - MVI  - Ocean nasal spray    # Difficulty with access to peripheral veins-- Blood draws from Left arm Medline     #Precautions / PROPHYLAXIS:   - Falls  - ortho: Weight bearing status: WBAT   - Lungs: Aspiration, Incentive Spirometer   - DVT PPX: Eliquis 5 mg BID   ----------------------------------------  11/28 --Labs CBC mild anemia, normal renal function, LFT elevated, downtrending overall unremarkable   ----------------------------------------  Liaison with other providers/agencies    PEER TO PEER with Dr Tripp (patient's insurance company)  * Peer to Peer completed, insurance approval retrospectively given to cover from 11/14 to 11/20, Insurance co awaits updated notes to determine further approval of coverage   SW team to send updated clinical and functional notes to medical insurance company  ----------------------------------------  IDT conference on 11/28   Social Work: May consider private hire.   SLP: --  OT: Setup for eating/grooming/UBD. Min A for LBD. Toilet transfer with mod A x2 (Lori steady). Standing balance improving to assist of 1 person.   PT: SB transfers with min A. Sit to stand transfer with Mod A x2 people. Amb 15ft with RW. Mod I with WC management.  RT: Yet to participate.   DME: WC, RW, drop arm commode, SB   Barriers: Easily fatigued, poor endurance.  Functional level on DC: Supervision for SB transfers. Toileting/bathing for mod A.   TDD: 12/6 to home    ----------------------------------------  OUTPATIENT/FOLLOW UP:    Trae Whitney  Pulmonary Disease  [address], [address] [place] [address]  Phone: [phone]  Fax: [phone]  Follow Up Time: 2 weeks    Ryanne Allen  Rheumatology  [address] [address]  Phone: [phone]  Fax: [phone]  Follow Up Time: 1 week    Kyle Pierce  Internal Medicine  [address] [place] [address], [address]  [place] [address]  Phone: [phone]  Fax: [phone]  Follow Up Time: 2 weeks    Addy Powers  Pulmonary Disease  [address] [facility], [address]  [place], [place] [address]  Phone: [phone]  Fax: [phone]  Follow Up Time:     Kwame Hawley  Critical Care Medicine  [address] [facility], [address]  [place] [address]  Phone: [phone]  Fax: [phone]  Follow Up Time:     Neena Borrego  Rheumatology  [address] [place] [address], [address]  [place] [address]  Phone: [phone]  Fax: [phone]  Follow Up Time:    [facility]  [facility] [address]  Scheduled Appointment: 09/13/2023  2:40 PM     Assessment/Plan:  ALIZA FOLEY is a 62 year old female with PMH of pAFIB and sciatica; who presented to [facility] ED with SOB, and was found to have groundglass opacities and interstitial lung disease. She was treated with empiric Vancomycin and Zosyn. She underwent a bronchoscopy with bronchoalveolar lavage and pulse steroids. She was given IV diuretics for increased pulmonary congestion, but her respiratory status continued to worsen.  On 9/13, she was transferred to [facility] for ECMO requirements. She was further treated with Plasmapheresis, Rituximab and high-dose steroids, with significant improvement. Her overall hospital course was complicated by thrombocytopenia (s/p several platelet transfusions), leukocytosis (infectious workup entirely negative- s/p Vanco and Ceftriaxone), AFIB with RVR (resolved with Metoprolol), dysphagia (requiring NGT), transaminitis (secondary to acute illness and hypotension),  non-occlusive RIJ DVT (started on Lovenox). Patient now admitted for a multidisciplinary rehab program. 10-05-23 @ 13:18    * Sustained functional improvement including progress with Wt bearing LE and improvement with ability at transfers, concentrate on prox LE muscle strengthening  * Making further progress, increased ambulatory distance with walker up to 20 ft  * Urinary frequency- UA with moderate Leuks- started on Bactrim- await UCx     #Interstitial Lung Disease and Mixed connective tissue disease  - Gait Instability, ADL impairments and Functional impairments: start Comprehensive Rehab Program of PT/OT/SLP - 3 hours a day, 5 days a week  - P&O as needed   - Non-specific Interstitial Lung Disease  - Requiring ECMO   - Improvement s/p Plasmapheresis, Rituximab and high- dose steroids   - Albuterol/Ipratropium Nebulizer every 6 hours  - Mepron 1500mg daily  - PO Medrol ( due to liver dysfunction): Plan will be to taper by ~20% every 2 weeks    Medrol   64mg x 2 weeks   56mg x 2 weeks  44mg x 2 weeks   36mg x 2 weeks - Follow up Outpatient   - Plan to wean 02 as tolerated, Continue tapering oxygen,  -  CT Chest noted 11/10---Resp f/u review  11/14, appreciated  They reports sustained improvement, clinical (normal oxy sats on R/A, sustained progress with oxy tapering), and radiological (no acute findings on recent CT Chest, rather residual chronic infiltrative diesease noted, with recommendation to repeat CT after Acute rehab treatment     #Knee buckling  - 12/1: S/p guided fall with physical therapy  - Back muscle strain with catching self on RW   - Lidocaine patch to back and BL deltoids  - Ibuprofen 400mg x1 dose     #Experiencing R mandible and occipital numbness, with associated hair loss- outpatient follow up with Rheumatology    #Posttnasal drip--ENT review 11/7, declined scope, continue flonase     #pAFIB/Rt Ij thrombus  - Metoprolol 25mg BID and lovenox  --- Eliquis 5mg BID - tolerating well     #Chronic Tinnitus   - ENT review and recs appreciate, Patient already made ENT appointment for f/u    #Lymphedema both legs -chronic and Rt IJ thrombus  - ACE Wrap BL LEs  - BL LE doppler negative for DVT  - BL UE doppler with chronic thrombotic changes in RIJ     #Transaminitis --LFT Down trending   - Secondary to acute illness and hypotension at [facility]  - Outpatient follow up     #Leucocytosis--steroid related, continue monitoring     #Neuropathy  - Gabapentin 300 mg BID, will consider increasing dose, increase to TID 11/10  --Work on motor strengthening, priority for UE as preferred by patient   - Vit b12 >2,000 in 9/2023  --Will consider Rhuematology f/u if needed    #Sleep/Mood  - Melatonin 6mg at HS  - Psych rec Xanax 0.25mg PRN    #Skin  - Skin: Left lower abdomen ruptured blister 3.0 x 1.0, stage 2 pressure injury to right buttock 4 x1 and left buttock 3x1, stage 2 pressure injury ti right inner thigh 0.2 x 0.2, right groin wound 10.5 x 2.0,   Wound care review, Left groin wound 11/15--- 0.6x1.8.0.1cm, no slough, healthy base  -- MAD to skin folds- Nystatin to submammary and abdominal pannus BID  -- MAD to L groin- cleanse with NS, Triad cream daily  -- Mad to R groin- Nystatin powder daily   -- R groin wound-- aquacel packing, Triad to periwound, Allevyn foam- daily and PRN   - Pressure injury/Skin: OOB to Chair, PT/OT   - Offload pressure, low air loss support surfaces, T&P every 2 hours   --Wound care consult-10/9 -Multiple wounds--perineal and buttock/sacral, recs appreciated   --Suggest Continue treatments as below:   Twice daily & PRN Normal Saline Cleanse of abdominal pannus & breast folds, perineal, Left & Right inner buttock erosions.  Pat thoroughly dry.   Apply Desitin generously twice daily (& PRN) to perineal, buttocks, and left groin erosions, leave open to air.    Apply Nystatin powder to abdominal pannus and breast folds.  Suggest use of Interdry cloths in folds to 'wick' moisture.  Suggest daily Normal Saline Cleanse of right groin surgical wound, pat dry.  Apply Cavillon film barrier to intact periwound skin.  Place Aquacell strip over open area, cover with foam dressing.  - Wound care following     #Pain Mgmt   - Tylenol PRN   - Gabapentin 300mg at HS     #GI/Bowel Mgmt/Hx of alternating bowel movements  - Pantoprazole  - PRN: Maalox   --Lactobacillus BID     #/Bladder Mgmt   -Spontaneously voiding   - UA (11/3) negative  - 12/4: Urinary frequency- UA with moderate Leuks- started on Bactrim- await UCx     #FEN   #Dysphagia  - Diet - Minced & Moist  [CC]    - Dysphagia- SLP evaluation     #Health maintenance  - Folic Acid   - MVI  - Ocean nasal spray    # Difficulty with access to peripheral veins-- Blood draws from Left arm Medline     #Precautions / PROPHYLAXIS:   - Falls  - ortho: Weight bearing status: WBAT   - Lungs: Aspiration, Incentive Spirometer   - DVT PPX: Eliquis 5 mg BID   ----------------------------------------  11/28 --Labs CBC mild anemia, normal renal function, LFT elevated, downtrending overall unremarkable   ----------------------------------------  Liaison with other providers/agencies    PEER TO PEER with Dr Tripp (patient's insurance company)  * Peer to Peer completed, insurance approval retrospectively given to cover from 11/14 to 11/20, Insurance co awaits updated notes to determine further approval of coverage   SW team to send updated clinical and functional notes to medical insurance company  ----------------------------------------  IDT conference on 11/28   Social Work: May consider private hire.   SLP: --  OT: Setup for eating/grooming/UBD. Min A for LBD. Toilet transfer with mod A x2 (Lori steady). Standing balance improving to assist of 1 person.   PT: SB transfers with min A. Sit to stand transfer with Mod A x2 people. Amb 15ft with RW. Mod I with WC management.  RT: Yet to participate.   DME: WC, RW, drop arm commode, SB   Barriers: Easily fatigued, poor endurance.  Functional level on DC: Supervision for SB transfers. Toileting/bathing for mod A.   TDD: 12/6 to home    ----------------------------------------  OUTPATIENT/FOLLOW UP:    Trae Whitney  Pulmonary Disease  [address], [address] [place] [address]  Phone: [phone]  Fax: [phone]  Follow Up Time: 2 weeks    Ryanne Allen  Rheumatology  [address] [address]  Phone: [phone]  Fax: [phone]  Follow Up Time: 1 week    Kyle Pierce  Internal Medicine  [address] [place] [address], [address]  [place] [address]  Phone: [phone]  Fax: [phone]  Follow Up Time: 2 weeks    Addy Powers  Pulmonary Disease  [address] [facility], [address]  [place], [place] [address]  Phone: [phone]  Fax: [phone]  Follow Up Time:     Kwame Hawley  Critical Care Medicine  [address] [facility], [address]  [place] [address]  Phone: [phone]  Fax: [phone]  Follow Up Time:     Neena Borrego  Rheumatology  [address] [place] [address], [address]  [place] [address]  Phone: [phone]  Fax: [phone]  Follow Up Time:    [facility]  [facility] [address]  Scheduled Appointment: 09/13/2023  2:40 PM

## 2023-12-04 NOTE — PROGRESS NOTE ADULT - SUBJECTIVE AND OBJECTIVE BOX
SUBJECTIVE/ROS:  Patient seen and examined at bedside this AM. Partner present.  Admits to sleeping well.  Experiencing urinary frequency.  Discussed need for UA- pt agreeable.     ROS--No chest or abd pain, no palpitations nausea or vomiting  Tingling/numbness as noted  LBM 12/3, per patient.    Therapy.  Observed today during therapy--sit to stand with mod assist x 2, but ambulated with walker 20 ft, improvement with balance noted (improvement from 15 ft yesterday)  Continues to ambulate functional distance with manual WC, without assistance  Sustained improvement with slide board transfer bed to WC  Sustained improvement of upper extremity function and strength      Vital Signs Last 24 Hrs  T(C): 36.8 (04 Dec 2023 08:36), Max: 36.8 (03 Dec 2023 20:36)  T(F): 98.3 (04 Dec 2023 08:36), Max: 98.3 (03 Dec 2023 20:36)  HR: 83 (04 Dec 2023 08:36) (82 - 83)  BP: 96/55 (04 Dec 2023 08:36) (96/55 - 110/63)  BP(mean): --  RR: 16 (04 Dec 2023 08:36) (16 - 16)  SpO2: 93% (04 Dec 2023 08:36) (93% - 93%)      PHYSICAL EXAM:  Gen -  Comfortable,  at bedside -normal oxy sat and subsequently self ambulating WC  Pulm - clear  Cardiovascular - HS i and II intermittently irregular  Abdomen - Soft, non tender,   Extremities - edema to b/l LE, no calf tenderness, LUE Med line    Neuro-  Cognitive - awake, alert, fully oriented,  Light tough sensation diffusely reduced on fingers and dorsal foot, and over right lower jaw, localized area approx 2 cm, no skin changes noted, non progressive   Motor -                    LEFT    UE - 4/5                    RIGHT UE - 4/5                     LEFT    LE - 3+/5, distally and 3/5 proximal                    RIGHT LE - Ankles PF 3/5 ,DF 2/5, Knee ext 3/5 Hips limited by pain Rt hip due to ulcer at ECHO access site   Sensory - decreased light tough sensation fingers and sole of foot, difusely  MSK: improvement with motor strength  Psychiatric - Mood stable, Affect WNL  Skin -- , stage 2 pressure injury to right buttock 4 x1 and left buttock 3x1, stage 2 pressure injury ti right inner thigh 0.2 x 0.2, right groin wound 10.5 x 2.0, Left groin wound 0.6x1.8.0.1cm  Mild discomfort on pressure over Rt inguinal ligament, but no swelling or erythema  MMT--Able to contract B/L upper back muscles, EF/EE 4/5 distally, knee Est 3+/5      LABS:            Urinalysis Basic - ( 04 Dec 2023 11:30 )    Color: Dark Yellow / Appearance: Clear / S.018 / pH: x  Gluc: x / Ketone: Negative mg/dL  / Bili: Negative / Urobili: 1.0 mg/dL   Blood: x / Protein: Negative mg/dL / Nitrite: Negative   Leuk Esterase: Moderate / RBC: 4 /HPF / WBC 14 /HPF   Sq Epi: x / Non Sq Epi: x / Bacteria: Few /HPF        MEDICATIONS  (STANDING):  apixaban 5 milliGRAM(s) Oral every 12 hours  artificial  tears Solution 1 Drop(s) Both EYES every 12 hours  ascorbic acid 500 milliGRAM(s) Oral daily  atovaquone  Suspension 1500 milliGRAM(s) Oral daily  dextrose 5%. 1000 milliLiter(s) (50 mL/Hr) IV Continuous <Continuous>  dextrose 5%. 1000 milliLiter(s) (100 mL/Hr) IV Continuous <Continuous>  dextrose 50% Injectable 12.5 Gram(s) IV Push once  dextrose 50% Injectable 25 Gram(s) IV Push once  dextrose 50% Injectable 25 Gram(s) IV Push once  folic acid 1 milliGRAM(s) Oral daily  gabapentin 300 milliGRAM(s) Oral three times a day  glucagon  Injectable 1 milliGRAM(s) IntraMuscular once  lactobacillus acidophilus 1 Tablet(s) Oral two times a day with meals  lidocaine   4% Patch 2 Patch Transdermal <User Schedule>  lidocaine   4% Patch 1 Patch Transdermal <User Schedule>  melatonin 6 milliGRAM(s) Oral at bedtime  methylPREDNISolone 36 milliGRAM(s) Oral daily  metoprolol tartrate 12.5 milliGRAM(s) Oral two times a day  multivitamin 1 Tablet(s) Oral daily  nystatin Powder 1 Application(s) Topical two times a day  pantoprazole    Tablet 40 milliGRAM(s) Oral before breakfast  polyethylene glycol 3350 17 Gram(s) Oral <User Schedule>  sodium chloride 0.9% lock flush 5 milliLiter(s) IV Push two times a day  trimethoprim  160 mG/sulfamethoxazole 800 mG 1 Tablet(s) Oral two times a day  zinc oxide 40% Paste 1 Application(s) Topical three times a day    MEDICATIONS  (PRN):  albuterol/ipratropium for Nebulization 3 milliLiter(s) Nebulizer every 6 hours PRN Shortness of Breath and/or Wheezing  ALPRAZolam 0.25 milliGRAM(s) Oral every 6 hours PRN Anxiety  aluminum hydroxide/magnesium hydroxide/simethicone Suspension 30 milliLiter(s) Oral every 4 hours PRN Dyspepsia  ammonium lactate 12% Lotion 1 Application(s) Topical every 12 hours PRN BL feet dryness  baclofen 5 milliGRAM(s) Oral every 8 hours PRN Musculoskeletal Pain  benzocaine/menthol Lozenge 1 Lozenge Oral two times a day PRN Sore Throat  benzonatate 100 milliGRAM(s) Oral three times a day PRN Cough  bisacodyl 5 milliGRAM(s) Oral daily PRN Constipation  dextrose Oral Gel 15 Gram(s) Oral once PRN Blood Glucose LESS THAN 70 milliGRAM(s)/deciliter  hydrocortisone hemorrhoidal Suppository 1 Suppository(s) Rectal two times a day PRN Hemorrhoids  loperamide 2 milliGRAM(s) Oral three times a day PRN Diarrhea  SIMETHICONE SOFTGELS 250 milliGRAM(s),SIMETHICONE SOFTGELS 250 milliGRAM(s) 250 milliGRAM(s) Oral three times a day PRN for gas  sodium chloride 0.65% Nasal 1 Spray(s) Both Nostrils every 8 hours PRN Nasal Congestion      SUBJECTIVE/ROS:  Patient seen and examined at bedside this AM. Partner present.  Admits to sleeping well.  Experiencing urinary frequency.  Discussed need for UA- pt agreeable.     ROS--No chest or abd pain, no palpitations nausea or vomiting  Tingling/numbness as noted  LBM 12/3, per patient.    Therapy.  Observed today during therapy--sit to stand with mod assist x 2, but ambulated with walker 20 ft, improvement with balance noted (improvement from 15 ft yesterday)  Continues to ambulate functional distance with manual WC, without assistance  Sustained improvement with slide board transfer bed to   Sustained improvement of upper extremity function and strength      Vital Signs Last 24 Hrs  T(C): 36.8 (04 Dec 2023 08:36), Max: 36.8 (03 Dec 2023 20:36)  T(F): 98.3 (04 Dec 2023 08:36), Max: 98.3 (03 Dec 2023 20:36)  HR: 83 (04 Dec 2023 08:36) (82 - 83)  BP: 96/55 (04 Dec 2023 08:36) (96/55 - 110/63)  RR: 16 (04 Dec 2023 08:36) (16 - 16)  SpO2: 93% (04 Dec 2023 08:36) (93% - 93%)      PHYSICAL EXAM:  Gen -  Comfortable,  at bedside -normal oxy sat and subsequently self ambulating   Pulm - clear  Cardiovascular - HS i and II intermittently irregular  Abdomen - Soft, non tender,   Extremities - edema to b/l LE, no calf tenderness, LUE Med line    Neuro-  Cognitive - awake, alert, fully oriented,  Light tough sensation diffusely reduced on fingers and dorsal foot, and over right lower jaw, localized area approx 2 cm, no skin changes noted, non progressive   Motor -                    LEFT    UE - 4/5                    RIGHT UE - 4/5                     LEFT    LE - 3+/5, distally and 3/5 proximal                    RIGHT LE - Ankles PF 3/5 ,DF 2/5, Knee ext 3/5 Hips limited by pain Rt hip due to ulcer at ECHO access site   Sensory - decreased light tough sensation fingers and sole of foot, difusely  MSK: improvement with motor strength  Psychiatric - Mood stable, Affect WNL  Skin -- , stage 2 pressure injury to right buttock 4 x1 and left buttock 3x1, stage 2 pressure injury ti right inner thigh 0.2 x 0.2, right groin wound 10.5 x 2.0, Left groin wound 0.6x1.8.0.1cm  Mild discomfort on pressure over Rt inguinal ligament, but no swelling or erythema  MMT--Able to contract B/L upper back muscles, EF/EE 4/5 distally, knee Est 3+/5      LABS:  None for 12/04    MEDICATIONS  (STANDING):  apixaban 5 milliGRAM(s) Oral every 12 hours  artificial  tears Solution 1 Drop(s) Both EYES every 12 hours  ascorbic acid 500 milliGRAM(s) Oral daily  atovaquone  Suspension 1500 milliGRAM(s) Oral daily  dextrose 5%. 1000 milliLiter(s) (50 mL/Hr) IV Continuous <Continuous>  dextrose 5%. 1000 milliLiter(s) (100 mL/Hr) IV Continuous <Continuous>  dextrose 50% Injectable 12.5 Gram(s) IV Push once  dextrose 50% Injectable 25 Gram(s) IV Push once  dextrose 50% Injectable 25 Gram(s) IV Push once  folic acid 1 milliGRAM(s) Oral daily  gabapentin 300 milliGRAM(s) Oral three times a day  glucagon  Injectable 1 milliGRAM(s) IntraMuscular once  lactobacillus acidophilus 1 Tablet(s) Oral two times a day with meals  lidocaine   4% Patch 2 Patch Transdermal <User Schedule>  lidocaine   4% Patch 1 Patch Transdermal <User Schedule>  melatonin 6 milliGRAM(s) Oral at bedtime  methylPREDNISolone 36 milliGRAM(s) Oral daily  metoprolol tartrate 12.5 milliGRAM(s) Oral two times a day  multivitamin 1 Tablet(s) Oral daily  nystatin Powder 1 Application(s) Topical two times a day  pantoprazole    Tablet 40 milliGRAM(s) Oral before breakfast  polyethylene glycol 3350 17 Gram(s) Oral <User Schedule>  sodium chloride 0.9% lock flush 5 milliLiter(s) IV Push two times a day  trimethoprim  160 mG/sulfamethoxazole 800 mG 1 Tablet(s) Oral two times a day  zinc oxide 40% Paste 1 Application(s) Topical three times a day    MEDICATIONS  (PRN):  albuterol/ipratropium for Nebulization 3 milliLiter(s) Nebulizer every 6 hours PRN Shortness of Breath and/or Wheezing  ALPRAZolam 0.25 milliGRAM(s) Oral every 6 hours PRN Anxiety  aluminum hydroxide/magnesium hydroxide/simethicone Suspension 30 milliLiter(s) Oral every 4 hours PRN Dyspepsia  ammonium lactate 12% Lotion 1 Application(s) Topical every 12 hours PRN BL feet dryness  baclofen 5 milliGRAM(s) Oral every 8 hours PRN Musculoskeletal Pain  benzocaine/menthol Lozenge 1 Lozenge Oral two times a day PRN Sore Throat  benzonatate 100 milliGRAM(s) Oral three times a day PRN Cough  bisacodyl 5 milliGRAM(s) Oral daily PRN Constipation  dextrose Oral Gel 15 Gram(s) Oral once PRN Blood Glucose LESS THAN 70 milliGRAM(s)/deciliter  hydrocortisone hemorrhoidal Suppository 1 Suppository(s) Rectal two times a day PRN Hemorrhoids  loperamide 2 milliGRAM(s) Oral three times a day PRN Diarrhea  SIMETHICONE SOFTGELS 250 milliGRAM(s),SIMETHICONE SOFTGELS 250 milliGRAM(s) 250 milliGRAM(s) Oral three times a day PRN for gas  sodium chloride 0.65% Nasal 1 Spray(s) Both Nostrils every 8 hours PRN Nasal Congestion

## 2023-12-04 NOTE — CHART NOTE - NSCHARTNOTEFT_GEN_A_CORE
Called by RN patient with fever 101F.   Patient seen and examined. Vitals noted - tachycardia, fever, no hypotension. spO2 90s on 2L NC  Patient notes chills and requesting blankets.   She denied dyspnea/shortness of breath/dizziness/lightheadedness/chest pain. She denies diarrhea/nausea/vomiting or abdominal pain.   She notes urinary frequency.   Denied nasal congestion or cough.    Brief exam limited by chills and patient request to sleep - lungs without crackles/wheeze/rhonci. No significant abdominal tenderness.     Chart was reviewed - bactrim started today for UTI, urine culture pending. Recent bloodwork without leukocytosis however pt on steroids.     Plan as below:  Tylenol 975mg PO now  650mg PRN q6 for fevers (mild transaminitis noted)  Will continue bactrim for now as this is day 1 and patient hesitant for IV medications.  PO hydration as patient hesitant for IV at this time.   Follow-up urine culture  Likely source is UTI given symptoms and will defer further diagnostic workup currently.   Will repeat bloodwork in AM.       Patient and RN in agreement with plan.

## 2023-12-05 LAB
ALBUMIN SERPL ELPH-MCNC: 2.7 G/DL — LOW (ref 3.3–5)
ALBUMIN SERPL ELPH-MCNC: 2.7 G/DL — LOW (ref 3.3–5)
ALP SERPL-CCNC: 127 U/L — HIGH (ref 40–120)
ALP SERPL-CCNC: 127 U/L — HIGH (ref 40–120)
ALT FLD-CCNC: 47 U/L — HIGH (ref 10–45)
ALT FLD-CCNC: 47 U/L — HIGH (ref 10–45)
ANION GAP SERPL CALC-SCNC: 10 MMOL/L — SIGNIFICANT CHANGE UP (ref 5–17)
ANION GAP SERPL CALC-SCNC: 10 MMOL/L — SIGNIFICANT CHANGE UP (ref 5–17)
AST SERPL-CCNC: 20 U/L — SIGNIFICANT CHANGE UP (ref 10–40)
AST SERPL-CCNC: 20 U/L — SIGNIFICANT CHANGE UP (ref 10–40)
BASOPHILS # BLD AUTO: 0.02 K/UL — SIGNIFICANT CHANGE UP (ref 0–0.2)
BASOPHILS # BLD AUTO: 0.02 K/UL — SIGNIFICANT CHANGE UP (ref 0–0.2)
BASOPHILS NFR BLD AUTO: 0.2 % — SIGNIFICANT CHANGE UP (ref 0–2)
BASOPHILS NFR BLD AUTO: 0.2 % — SIGNIFICANT CHANGE UP (ref 0–2)
BILIRUB SERPL-MCNC: 0.9 MG/DL — SIGNIFICANT CHANGE UP (ref 0.2–1.2)
BILIRUB SERPL-MCNC: 0.9 MG/DL — SIGNIFICANT CHANGE UP (ref 0.2–1.2)
BUN SERPL-MCNC: 21 MG/DL — SIGNIFICANT CHANGE UP (ref 7–23)
BUN SERPL-MCNC: 21 MG/DL — SIGNIFICANT CHANGE UP (ref 7–23)
CALCIUM SERPL-MCNC: 9 MG/DL — SIGNIFICANT CHANGE UP (ref 8.4–10.5)
CALCIUM SERPL-MCNC: 9 MG/DL — SIGNIFICANT CHANGE UP (ref 8.4–10.5)
CHLORIDE SERPL-SCNC: 98 MMOL/L — SIGNIFICANT CHANGE UP (ref 96–108)
CHLORIDE SERPL-SCNC: 98 MMOL/L — SIGNIFICANT CHANGE UP (ref 96–108)
CO2 SERPL-SCNC: 25 MMOL/L — SIGNIFICANT CHANGE UP (ref 22–31)
CO2 SERPL-SCNC: 25 MMOL/L — SIGNIFICANT CHANGE UP (ref 22–31)
CREAT SERPL-MCNC: 0.81 MG/DL — SIGNIFICANT CHANGE UP (ref 0.5–1.3)
CREAT SERPL-MCNC: 0.81 MG/DL — SIGNIFICANT CHANGE UP (ref 0.5–1.3)
EGFR: 81 ML/MIN/1.73M2 — SIGNIFICANT CHANGE UP
EGFR: 81 ML/MIN/1.73M2 — SIGNIFICANT CHANGE UP
EOSINOPHIL # BLD AUTO: 0.11 K/UL — SIGNIFICANT CHANGE UP (ref 0–0.5)
EOSINOPHIL # BLD AUTO: 0.11 K/UL — SIGNIFICANT CHANGE UP (ref 0–0.5)
EOSINOPHIL NFR BLD AUTO: 1.3 % — SIGNIFICANT CHANGE UP (ref 0–6)
EOSINOPHIL NFR BLD AUTO: 1.3 % — SIGNIFICANT CHANGE UP (ref 0–6)
GLUCOSE SERPL-MCNC: 151 MG/DL — HIGH (ref 70–99)
GLUCOSE SERPL-MCNC: 151 MG/DL — HIGH (ref 70–99)
HCT VFR BLD CALC: 31 % — LOW (ref 34.5–45)
HCT VFR BLD CALC: 31 % — LOW (ref 34.5–45)
HGB BLD-MCNC: 9.8 G/DL — LOW (ref 11.5–15.5)
HGB BLD-MCNC: 9.8 G/DL — LOW (ref 11.5–15.5)
IMM GRANULOCYTES NFR BLD AUTO: 0.7 % — SIGNIFICANT CHANGE UP (ref 0–0.9)
IMM GRANULOCYTES NFR BLD AUTO: 0.7 % — SIGNIFICANT CHANGE UP (ref 0–0.9)
LACTATE SERPL-SCNC: 2.5 MMOL/L — HIGH (ref 0.7–2)
LACTATE SERPL-SCNC: 2.5 MMOL/L — HIGH (ref 0.7–2)
LYMPHOCYTES # BLD AUTO: 0.07 K/UL — LOW (ref 1–3.3)
LYMPHOCYTES # BLD AUTO: 0.07 K/UL — LOW (ref 1–3.3)
LYMPHOCYTES # BLD AUTO: 0.9 % — LOW (ref 13–44)
LYMPHOCYTES # BLD AUTO: 0.9 % — LOW (ref 13–44)
MCHC RBC-ENTMCNC: 26.8 PG — LOW (ref 27–34)
MCHC RBC-ENTMCNC: 26.8 PG — LOW (ref 27–34)
MCHC RBC-ENTMCNC: 31.6 GM/DL — LOW (ref 32–36)
MCHC RBC-ENTMCNC: 31.6 GM/DL — LOW (ref 32–36)
MCV RBC AUTO: 84.9 FL — SIGNIFICANT CHANGE UP (ref 80–100)
MCV RBC AUTO: 84.9 FL — SIGNIFICANT CHANGE UP (ref 80–100)
MONOCYTES # BLD AUTO: 0.49 K/UL — SIGNIFICANT CHANGE UP (ref 0–0.9)
MONOCYTES # BLD AUTO: 0.49 K/UL — SIGNIFICANT CHANGE UP (ref 0–0.9)
MONOCYTES NFR BLD AUTO: 6 % — SIGNIFICANT CHANGE UP (ref 2–14)
MONOCYTES NFR BLD AUTO: 6 % — SIGNIFICANT CHANGE UP (ref 2–14)
NEUTROPHILS # BLD AUTO: 7.48 K/UL — HIGH (ref 1.8–7.4)
NEUTROPHILS # BLD AUTO: 7.48 K/UL — HIGH (ref 1.8–7.4)
NEUTROPHILS NFR BLD AUTO: 90.9 % — HIGH (ref 43–77)
NEUTROPHILS NFR BLD AUTO: 90.9 % — HIGH (ref 43–77)
NRBC # BLD: 0 /100 WBCS — SIGNIFICANT CHANGE UP (ref 0–0)
NRBC # BLD: 0 /100 WBCS — SIGNIFICANT CHANGE UP (ref 0–0)
PLATELET # BLD AUTO: 230 K/UL — SIGNIFICANT CHANGE UP (ref 150–400)
PLATELET # BLD AUTO: 230 K/UL — SIGNIFICANT CHANGE UP (ref 150–400)
POTASSIUM SERPL-MCNC: 3.5 MMOL/L — SIGNIFICANT CHANGE UP (ref 3.5–5.3)
POTASSIUM SERPL-MCNC: 3.5 MMOL/L — SIGNIFICANT CHANGE UP (ref 3.5–5.3)
POTASSIUM SERPL-SCNC: 3.5 MMOL/L — SIGNIFICANT CHANGE UP (ref 3.5–5.3)
POTASSIUM SERPL-SCNC: 3.5 MMOL/L — SIGNIFICANT CHANGE UP (ref 3.5–5.3)
PROT SERPL-MCNC: 5.1 G/DL — LOW (ref 6–8.3)
PROT SERPL-MCNC: 5.1 G/DL — LOW (ref 6–8.3)
RAPID RVP RESULT: SIGNIFICANT CHANGE UP
RAPID RVP RESULT: SIGNIFICANT CHANGE UP
RBC # BLD: 3.65 M/UL — LOW (ref 3.8–5.2)
RBC # BLD: 3.65 M/UL — LOW (ref 3.8–5.2)
RBC # FLD: 16 % — HIGH (ref 10.3–14.5)
RBC # FLD: 16 % — HIGH (ref 10.3–14.5)
SARS-COV-2 RNA SPEC QL NAA+PROBE: SIGNIFICANT CHANGE UP
SARS-COV-2 RNA SPEC QL NAA+PROBE: SIGNIFICANT CHANGE UP
SODIUM SERPL-SCNC: 133 MMOL/L — LOW (ref 135–145)
SODIUM SERPL-SCNC: 133 MMOL/L — LOW (ref 135–145)
WBC # BLD: 8.23 K/UL — SIGNIFICANT CHANGE UP (ref 3.8–10.5)
WBC # BLD: 8.23 K/UL — SIGNIFICANT CHANGE UP (ref 3.8–10.5)
WBC # FLD AUTO: 8.23 K/UL — SIGNIFICANT CHANGE UP (ref 3.8–10.5)
WBC # FLD AUTO: 8.23 K/UL — SIGNIFICANT CHANGE UP (ref 3.8–10.5)

## 2023-12-05 PROCEDURE — 71250 CT THORAX DX C-: CPT | Mod: 26

## 2023-12-05 PROCEDURE — 99233 SBSQ HOSP IP/OBS HIGH 50: CPT

## 2023-12-05 PROCEDURE — 99232 SBSQ HOSP IP/OBS MODERATE 35: CPT | Mod: GC

## 2023-12-05 RX ORDER — ACETAMINOPHEN 500 MG
650 TABLET ORAL EVERY 6 HOURS
Refills: 0 | Status: COMPLETED | OUTPATIENT
Start: 2023-12-05 | End: 2023-12-07

## 2023-12-05 RX ORDER — SODIUM CHLORIDE 9 MG/ML
2000 INJECTION INTRAMUSCULAR; INTRAVENOUS; SUBCUTANEOUS ONCE
Refills: 0 | Status: DISCONTINUED | OUTPATIENT
Start: 2023-12-05 | End: 2023-12-05

## 2023-12-05 RX ORDER — PIPERACILLIN AND TAZOBACTAM 4; .5 G/20ML; G/20ML
3.38 INJECTION, POWDER, LYOPHILIZED, FOR SOLUTION INTRAVENOUS ONCE
Refills: 0 | Status: DISCONTINUED | OUTPATIENT
Start: 2023-12-05 | End: 2023-12-05

## 2023-12-05 RX ORDER — SODIUM CHLORIDE 9 MG/ML
1000 INJECTION INTRAMUSCULAR; INTRAVENOUS; SUBCUTANEOUS ONCE
Refills: 0 | Status: COMPLETED | OUTPATIENT
Start: 2023-12-05 | End: 2023-12-05

## 2023-12-05 RX ORDER — SODIUM CHLORIDE 9 MG/ML
1000 INJECTION INTRAMUSCULAR; INTRAVENOUS; SUBCUTANEOUS
Refills: 0 | Status: DISCONTINUED | OUTPATIENT
Start: 2023-12-05 | End: 2023-12-06

## 2023-12-05 RX ORDER — PIPERACILLIN AND TAZOBACTAM 4; .5 G/20ML; G/20ML
3.38 INJECTION, POWDER, LYOPHILIZED, FOR SOLUTION INTRAVENOUS EVERY 8 HOURS
Refills: 0 | Status: DISCONTINUED | OUTPATIENT
Start: 2023-12-05 | End: 2023-12-05

## 2023-12-05 RX ADMIN — NYSTATIN CREAM 1 APPLICATION(S): 100000 CREAM TOPICAL at 21:17

## 2023-12-05 RX ADMIN — GABAPENTIN 300 MILLIGRAM(S): 400 CAPSULE ORAL at 21:15

## 2023-12-05 RX ADMIN — ZINC OXIDE 1 APPLICATION(S): 200 OINTMENT TOPICAL at 21:38

## 2023-12-05 RX ADMIN — Medication 500 MILLIGRAM(S): at 21:16

## 2023-12-05 RX ADMIN — Medication 650 MILLIGRAM(S): at 20:35

## 2023-12-05 RX ADMIN — ATOVAQUONE 1500 MILLIGRAM(S): 750 SUSPENSION ORAL at 21:17

## 2023-12-05 RX ADMIN — Medication 1 TABLET(S): at 21:16

## 2023-12-05 RX ADMIN — Medication 1 TABLET(S): at 08:56

## 2023-12-05 RX ADMIN — Medication 650 MILLIGRAM(S): at 09:53

## 2023-12-05 RX ADMIN — ZINC OXIDE 1 APPLICATION(S): 200 OINTMENT TOPICAL at 08:57

## 2023-12-05 RX ADMIN — Medication 1 DROP(S): at 08:54

## 2023-12-05 RX ADMIN — ZINC OXIDE 1 APPLICATION(S): 200 OINTMENT TOPICAL at 15:10

## 2023-12-05 RX ADMIN — Medication 36 MILLIGRAM(S): at 08:55

## 2023-12-05 RX ADMIN — GABAPENTIN 300 MILLIGRAM(S): 400 CAPSULE ORAL at 17:04

## 2023-12-05 RX ADMIN — NYSTATIN CREAM 1 APPLICATION(S): 100000 CREAM TOPICAL at 08:57

## 2023-12-05 RX ADMIN — GABAPENTIN 300 MILLIGRAM(S): 400 CAPSULE ORAL at 08:53

## 2023-12-05 RX ADMIN — Medication 650 MILLIGRAM(S): at 08:53

## 2023-12-05 RX ADMIN — APIXABAN 5 MILLIGRAM(S): 2.5 TABLET, FILM COATED ORAL at 21:15

## 2023-12-05 RX ADMIN — APIXABAN 5 MILLIGRAM(S): 2.5 TABLET, FILM COATED ORAL at 08:55

## 2023-12-05 RX ADMIN — PANTOPRAZOLE SODIUM 40 MILLIGRAM(S): 20 TABLET, DELAYED RELEASE ORAL at 08:55

## 2023-12-05 RX ADMIN — Medication 650 MILLIGRAM(S): at 21:30

## 2023-12-05 RX ADMIN — Medication 1 MILLIGRAM(S): at 21:17

## 2023-12-05 RX ADMIN — Medication 1 DROP(S): at 21:18

## 2023-12-05 RX ADMIN — Medication 1 TABLET(S): at 17:19

## 2023-12-05 RX ADMIN — Medication 12.5 MILLIGRAM(S): at 21:15

## 2023-12-05 RX ADMIN — SODIUM CHLORIDE 5 MILLILITER(S): 9 INJECTION INTRAMUSCULAR; INTRAVENOUS; SUBCUTANEOUS at 21:19

## 2023-12-05 NOTE — PROGRESS NOTE ADULT - SUBJECTIVE AND OBJECTIVE BOX
SUBJECTIVE/ROS:  Patient seen and examined at bedside this AM. Partner present.  Admits to intermittent sleep due to fever overnight.  Experiencing chills.  Discussed need further fever workup.    ROS--No chest or abd pain, no palpitations nausea or vomiting  Tingling/numbness as noted  LB 12/4, per patient.    Therapy.  Min A for sit to supine transfers (improved from Mod A x2 persons perviously)  Ambulating 65ft with bariatric RW (improved from 15 ft previously)  Supervision for upper body dressing  Sustained improvement with slide board transfer bed to   Sustained improvement of upper extremity function and strength      Vital Signs Last 24 Hrs  T(C): 38 (05 Dec 2023 09:15), Max: 38.6 (04 Dec 2023 22:20)  T(F): 100.4 (05 Dec 2023 09:15), Max: 101.4 (04 Dec 2023 22:20)  HR: 102 (05 Dec 2023 09:15) (102 - 115)  BP: 96/59 (05 Dec 2023 09:15) (96/59 - 124/62)  BP(mean): --  RR: 16 (05 Dec 2023 09:15) (16 - 16)  SpO2: 97% (05 Dec 2023 09:15) (90% - 97%)      PHYSICAL EXAM:  Gen -  Comfortable,  at bedside -normal oxy sat and subsequently self ambulating   Pulm - clear  Cardiovascular - HS i and II intermittently irregular  Abdomen - Soft, non tender,   Extremities - edema to b/l LE, no calf tenderness, LUE Med line    Neuro-  Cognitive - awake, alert, fully oriented,  Light tough sensation diffusely reduced on fingers and dorsal foot, and over right lower jaw, localized area approx 2 cm, no skin changes noted, non progressive   Motor -                    LEFT    UE - 4/5                    RIGHT UE - 4/5                     LEFT    LE - 3+/5, distally and 3/5 proximal                    RIGHT LE - Ankles PF 3/5 ,DF 2/5, Knee ext 3/5 Hips limited by pain Rt hip due to ulcer at ECHO access site   Sensory - decreased light tough sensation fingers and sole of foot, difusely  MSK: improvement with motor strength  Psychiatric - Mood stable, Affect WNL  Skin -- , stage 2 pressure injury to right buttock 4 x1 and left buttock 3x1, stage 2 pressure injury ti right inner thigh 0.2 x 0.2, right groin wound 10.5 x 2.0, Left groin wound 0.6x1.8.0.1cm  Mild discomfort on pressure over Rt inguinal ligament, but no swelling or erythema  MMT--Able to contract B/L upper back muscles, EF/EE 4/5 distally, knee Est 3+/5      LABS:                        9.8    8.23  )-----------( 230      ( 05 Dec 2023 11:00 )             31.0     12-05    133<L>  |  98  |  21  ----------------------------<  151<H>  3.5   |  25  |  0.81    Ca    9.0      05 Dec 2023 11:00    TPro  5.1<L>  /  Alb  2.7<L>  /  TBili  0.9  /  DBili  x   /  AST  20  /  ALT  47<H>  /  AlkPhos  127<H>  12-05      Urinalysis Basic - ( 05 Dec 2023 11:00 )    Color: x / Appearance: x / SG: x / pH: x  Gluc: 151 mg/dL / Ketone: x  / Bili: x / Urobili: x   Blood: x / Protein: x / Nitrite: x   Leuk Esterase: x / RBC: x / WBC x   Sq Epi: x / Non Sq Epi: x / Bacteria: x          MEDICATIONS  (STANDING):  apixaban 5 milliGRAM(s) Oral every 12 hours  artificial  tears Solution 1 Drop(s) Both EYES every 12 hours  ascorbic acid 500 milliGRAM(s) Oral daily  atovaquone  Suspension 1500 milliGRAM(s) Oral daily  dextrose 5%. 1000 milliLiter(s) (50 mL/Hr) IV Continuous <Continuous>  dextrose 5%. 1000 milliLiter(s) (100 mL/Hr) IV Continuous <Continuous>  dextrose 50% Injectable 12.5 Gram(s) IV Push once  dextrose 50% Injectable 25 Gram(s) IV Push once  dextrose 50% Injectable 25 Gram(s) IV Push once  folic acid 1 milliGRAM(s) Oral daily  gabapentin 300 milliGRAM(s) Oral three times a day  glucagon  Injectable 1 milliGRAM(s) IntraMuscular once  lactobacillus acidophilus 1 Tablet(s) Oral two times a day with meals  lidocaine   4% Patch 1 Patch Transdermal <User Schedule>  lidocaine   4% Patch 2 Patch Transdermal <User Schedule>  melatonin 6 milliGRAM(s) Oral at bedtime  methylPREDNISolone 36 milliGRAM(s) Oral daily  metoprolol tartrate 12.5 milliGRAM(s) Oral two times a day  multivitamin 1 Tablet(s) Oral daily  nystatin Powder 1 Application(s) Topical two times a day  pantoprazole    Tablet 40 milliGRAM(s) Oral before breakfast  piperacillin/tazobactam IVPB. 3.375 Gram(s) IV Intermittent once  piperacillin/tazobactam IVPB.. 3.375 Gram(s) IV Intermittent every 8 hours  polyethylene glycol 3350 17 Gram(s) Oral <User Schedule>  sodium chloride 0.9% Bolus 2000 milliLiter(s) IV Bolus once  sodium chloride 0.9% lock flush 5 milliLiter(s) IV Push two times a day  trimethoprim  160 mG/sulfamethoxazole 800 mG 1 Tablet(s) Oral two times a day  zinc oxide 40% Paste 1 Application(s) Topical three times a day    MEDICATIONS  (PRN):  acetaminophen     Tablet .. 650 milliGRAM(s) Oral every 6 hours PRN Temp greater or equal to 38C (100.4F)  albuterol/ipratropium for Nebulization 3 milliLiter(s) Nebulizer every 6 hours PRN Shortness of Breath and/or Wheezing  ALPRAZolam 0.25 milliGRAM(s) Oral every 6 hours PRN Anxiety  aluminum hydroxide/magnesium hydroxide/simethicone Suspension 30 milliLiter(s) Oral every 4 hours PRN Dyspepsia  ammonium lactate 12% Lotion 1 Application(s) Topical every 12 hours PRN BL feet dryness  baclofen 5 milliGRAM(s) Oral every 8 hours PRN Musculoskeletal Pain  benzocaine/menthol Lozenge 1 Lozenge Oral two times a day PRN Sore Throat  benzonatate 100 milliGRAM(s) Oral three times a day PRN Cough  bisacodyl 5 milliGRAM(s) Oral daily PRN Constipation  dextrose Oral Gel 15 Gram(s) Oral once PRN Blood Glucose LESS THAN 70 milliGRAM(s)/deciliter  hydrocortisone hemorrhoidal Suppository 1 Suppository(s) Rectal two times a day PRN Hemorrhoids  loperamide 2 milliGRAM(s) Oral three times a day PRN Diarrhea  SIMETHICONE SOFTGELS 250 milliGRAM(s),SIMETHICONE SOFTGELS 250 milliGRAM(s) 250 milliGRAM(s) Oral three times a day PRN for gas  sodium chloride 0.65% Nasal 1 Spray(s) Both Nostrils every 8 hours PRN Nasal Congestion      SUBJECTIVE/ROS:  Patient seen and examined at bedside this AM. Partner present.  Admits to intermittent sleep due to fever overnight.  Experiencing chills.  Discussed need further fever workup.    ROS--No chest or abd pain, no palpitations nausea or vomiting  Tingling/numbness as noted  LB 12/4, per patient.    Therapy.  Min A for sit to supine transfers (improved from Mod A x2 persons perviously)  Ambulating 65ft with bariatric RW (improved from 15 ft previously)  Supervision for upper body dressing  Sustained improvement with slide board transfer bed to   Sustained improvement of upper extremity function and strength      Vital Signs Last 24 Hrs  T(C): 38 (05 Dec 2023 09:15), Max: 38.6 (04 Dec 2023 22:20)  T(F): 100.4 (05 Dec 2023 09:15), Max: 101.4 (04 Dec 2023 22:20)  HR: 102 (05 Dec 2023 09:15) (102 - 115)  BP: 96/59 (05 Dec 2023 09:15) (96/59 - 124/62)  RR: 16 (05 Dec 2023 09:15) (16 - 16)  SpO2: 97% (05 Dec 2023 09:15) (90% - 97%)      PHYSICAL EXAM:  Gen -  Comfortable,  at bedside -normal oxy sat and subsequently self ambulating   Pulm - clear  Cardiovascular - HS i and II intermittently irregular  Abdomen - Soft, non tender,   Extremities - edema to b/l LE, no calf tenderness, LUE Med line    Neuro-  Cognitive - awake, alert, fully oriented,  Light tough sensation diffusely reduced on fingers and dorsal foot, and over right lower jaw, localized area approx 2 cm, no skin changes noted, non progressive   Motor -                    LEFT    UE - 4/5                    RIGHT UE - 4/5                     LEFT    LE - 3+/5, distally and 3/5 proximal                    RIGHT LE - Ankles PF 3/5 ,DF 2/5, Knee ext 3/5 Hips limited by pain Rt hip due to ulcer at ECHO access site   Sensory - decreased light tough sensation fingers and sole of foot, difusely  MSK: improvement with motor strength  Psychiatric - Mood stable, Affect WNL  Skin -- , stage 2 pressure injury to right buttock 4 x1 and left buttock 3x1, stage 2 pressure injury ti right inner thigh 0.2 x 0.2, right groin wound 10.5 x 2.0, Left groin wound 0.6x1.8.0.1cm  Mild discomfort on pressure over Rt inguinal ligament, but no swelling or erythema  MMT--Able to contract B/L upper back muscles, EF/EE 4/5 distally, knee Est 3+/5      LABS:                        9.8    8.23  )-----------( 230      ( 05 Dec 2023 11:00 )             31.0     12-05    133<L>  |  98  |  21  ----------------------------<  151<H>  3.5   |  25  |  0.81    Ca    9.0      05 Dec 2023 11:00    TPro  5.1<L>  /  Alb  2.7<L>  /  TBili  0.9  /  DBili  x   /  AST  20  /  ALT  47<H>  /  AlkPhos  127<H>  12-05    MEDICATIONS  (STANDING):  apixaban 5 milliGRAM(s) Oral every 12 hours  artificial  tears Solution 1 Drop(s) Both EYES every 12 hours  ascorbic acid 500 milliGRAM(s) Oral daily  atovaquone  Suspension 1500 milliGRAM(s) Oral daily  dextrose 5%. 1000 milliLiter(s) (50 mL/Hr) IV Continuous <Continuous>  dextrose 5%. 1000 milliLiter(s) (100 mL/Hr) IV Continuous <Continuous>  dextrose 50% Injectable 12.5 Gram(s) IV Push once  dextrose 50% Injectable 25 Gram(s) IV Push once  dextrose 50% Injectable 25 Gram(s) IV Push once  folic acid 1 milliGRAM(s) Oral daily  gabapentin 300 milliGRAM(s) Oral three times a day  glucagon  Injectable 1 milliGRAM(s) IntraMuscular once  lactobacillus acidophilus 1 Tablet(s) Oral two times a day with meals  lidocaine   4% Patch 1 Patch Transdermal <User Schedule>  lidocaine   4% Patch 2 Patch Transdermal <User Schedule>  melatonin 6 milliGRAM(s) Oral at bedtime  methylPREDNISolone 36 milliGRAM(s) Oral daily  metoprolol tartrate 12.5 milliGRAM(s) Oral two times a day  multivitamin 1 Tablet(s) Oral daily  nystatin Powder 1 Application(s) Topical two times a day  pantoprazole    Tablet 40 milliGRAM(s) Oral before breakfast  piperacillin/tazobactam IVPB. 3.375 Gram(s) IV Intermittent once  piperacillin/tazobactam IVPB.. 3.375 Gram(s) IV Intermittent every 8 hours  polyethylene glycol 3350 17 Gram(s) Oral <User Schedule>  sodium chloride 0.9% Bolus 2000 milliLiter(s) IV Bolus once  sodium chloride 0.9% lock flush 5 milliLiter(s) IV Push two times a day  trimethoprim  160 mG/sulfamethoxazole 800 mG 1 Tablet(s) Oral two times a day  zinc oxide 40% Paste 1 Application(s) Topical three times a day    MEDICATIONS  (PRN):  acetaminophen     Tablet .. 650 milliGRAM(s) Oral every 6 hours PRN Temp greater or equal to 38C (100.4F)  albuterol/ipratropium for Nebulization 3 milliLiter(s) Nebulizer every 6 hours PRN Shortness of Breath and/or Wheezing  ALPRAZolam 0.25 milliGRAM(s) Oral every 6 hours PRN Anxiety  aluminum hydroxide/magnesium hydroxide/simethicone Suspension 30 milliLiter(s) Oral every 4 hours PRN Dyspepsia  ammonium lactate 12% Lotion 1 Application(s) Topical every 12 hours PRN BL feet dryness  baclofen 5 milliGRAM(s) Oral every 8 hours PRN Musculoskeletal Pain  benzocaine/menthol Lozenge 1 Lozenge Oral two times a day PRN Sore Throat  benzonatate 100 milliGRAM(s) Oral three times a day PRN Cough  bisacodyl 5 milliGRAM(s) Oral daily PRN Constipation  dextrose Oral Gel 15 Gram(s) Oral once PRN Blood Glucose LESS THAN 70 milliGRAM(s)/deciliter  hydrocortisone hemorrhoidal Suppository 1 Suppository(s) Rectal two times a day PRN Hemorrhoids  loperamide 2 milliGRAM(s) Oral three times a day PRN Diarrhea  SIMETHICONE SOFTGELS 250 milliGRAM(s),SIMETHICONE SOFTGELS 250 milliGRAM(s) 250 milliGRAM(s) Oral three times a day PRN for gas  sodium chloride 0.65% Nasal 1 Spray(s) Both Nostrils every 8 hours PRN Nasal Congestion

## 2023-12-05 NOTE — PROVIDER CONTACT NOTE (OTHER) - BACKGROUND
interstitial lung disease
61 yo female past medical history of Afib who presented to [facility] with shortness of breath and was found to have ground glass opacities and intersitial lung disease.
admitted for intestinal lung disease
62y.o. Female dx Interstitial Lung Disease Wounds present on Buttocks, Present issue of Hemmorrhoids. Light bleeding noted at beginning of shift and Hemmorhoid cream was ordered.
Patient admitted Interstitial lung disease.

## 2023-12-05 NOTE — PROVIDER CONTACT NOTE (OTHER) - ACTION/TREATMENT ORDERED:
Stat methylprednisolone 56mg PO. Continued with incremental taper of O2 as ordered by MD. Q1 pulse Ox monitoring and rounding done on patient. 3L nasal canula to maintain spo2 over 91.
monitor pt
NP will come speak with patient in a few.
MD ordered Loperamide and Simethicone. STAT labs CBCw/diff & PT/INR Ordered. Lovenox and cathartics to be held.
MD notified Tylenol administer, with positive effect. No further orders at this time. Patient continued to be monitor.

## 2023-12-05 NOTE — PROGRESS NOTE ADULT - ASSESSMENT
62 year old female with PMH of pAFIB and sciatica; who presented to [facility] ED with SOB, and was found to have groundglass opacities and interstitial lung disease. She was treated with empiric Vancomycin and Zosyn. She underwent a bronchoscopy with bronchoalveolar lavage and pulse steroids. She was given IV diuretics for increased pulmonary congestion, but her respiratory status continued to worsen.  On 9/13, she was transferred to [facility] for ECMO requirements. She was further treated with Plasmapheresis, Rituximab and high-dose steroids, with significant improvement. Her overall hospital course was complicated by thrombocytopenia (s/p several platelet transfusions), leukocytosis (infectious workup entirely negative- s/p Vanco and Ceftriaxone), AFIB with RVR (resolved with Metoprolol), dysphagia (requiring NGT), transaminitis (secondary to acute illness and hypotension),  non-occlusive RIJ DVT (started on Lovenox). Patient now admitted for a multidisciplinary rehab program- pt/ot/dvt ppx    #Interstitial Lung Disease, s/p ECMO   - s/p Plasmapheresis, Rituximab and high- dose steroids   - Albuterol/Ipratropium Nebulizer every 6 hours  - Mepron   - PO Medrol taper- Plan will be to taper by ~20% every 2 weeks  - Repeat CT Chest in 6 weeks   - o2 support prn  - pulm and cardio consult recc noted    # fever, tachycardia, lactic acidosis, in the setting of ILD on prednisone- r/o PNA, r/o UTI  - BCX 2 SENT , UA - NON IMPRESSIVE, URINE c/s PENDING   - was started on bactrim- but no improvement will change to zosyn   - iv fluids ordered  - pt refusing ivf and abtx- scared she may develop fluid overload, i explained to her risks of holding ivf and abtx in the present setting- but she say she will think about it.   - cxr and ct chest ordered urgent  - RVP ordered  - d/w ID at the beside.     #pAFIB  - Metoprolol     - DVT:  Lovenox 70mg BID- switch to Eliquis   - RIJ DVT found at ECMO cannula     #Transaminitis, jaundice  - Secondary to acute illness and hypotension at [facility]  - Trend LFTs, bili-  downtrending  - Outpatient follow up     #Sleep  - Melatonin     will follow  d/w rehab team    time spent in e/m visit 55 min      62 year old female with PMH of pAFIB and sciatica; who presented to [facility] ED with SOB, and was found to have groundglass opacities and interstitial lung disease. She was treated with empiric Vancomycin and Zosyn. She underwent a bronchoscopy with bronchoalveolar lavage and pulse steroids. She was given IV diuretics for increased pulmonary congestion, but her respiratory status continued to worsen.  On 9/13, she was transferred to [facility] for ECMO requirements. She was further treated with Plasmapheresis, Rituximab and high-dose steroids, with significant improvement. Her overall hospital course was complicated by thrombocytopenia (s/p several platelet transfusions), leukocytosis (infectious workup entirely negative- s/p Vanco and Ceftriaxone), AFIB with RVR (resolved with Metoprolol), dysphagia (requiring NGT), transaminitis (secondary to acute illness and hypotension),  non-occlusive RIJ DVT (started on Lovenox). Patient now admitted for a multidisciplinary rehab program- pt/ot/dvt ppx    #Interstitial Lung Disease, s/p ECMO   - s/p Plasmapheresis, Rituximab and high- dose steroids   - Albuterol/Ipratropium Nebulizer every 6 hours  - Mepron   - PO Medrol taper- Plan will be to taper by ~20% every 2 weeks  - Repeat CT Chest in 6 weeks   - o2 support prn  - pulm and cardio consult recc noted    # fever, tachycardia, lactic acidosis, in the setting of ILD on prednisone- r/o PNA, r/o UTI  - BCX 2 SENT , UA - NON IMPRESSIVE, URINE c/s PENDING   - was started on bactrim- but no improvement will change to zosyn   - iv fluids ordered  - pt refusing ivf and abtx- scared she may develop fluid overload, i explained to her risks of holding ivf and abtx in the present setting- but she say she will think about it.   - cxr and ct chest ordered urgent  - RVP ordered  - d/w ID at the beside.     #pAFIB  - Metoprolol     - DVT:  Lovenox 70mg BID- switch to Eliquis   - RIJ DVT found at ECMO cannula     #Transaminitis, jaundice  - Secondary to acute illness and hypotension at [facility]  - Trend LFTs, bili-  downtrending  - Outpatient follow up     #Sleep  - Melatonin     will follow  d/w rehab team    time spent in e/m visit 55 min

## 2023-12-05 NOTE — PROGRESS NOTE ADULT - ASSESSMENT
Assessment/Plan:  ALIZA FOLEY is a 62 year old female with PMH of pAFIB and sciatica; who presented to [facility] ED with SOB, and was found to have groundglass opacities and interstitial lung disease. She was treated with empiric Vancomycin and Zosyn. She underwent a bronchoscopy with bronchoalveolar lavage and pulse steroids. She was given IV diuretics for increased pulmonary congestion, but her respiratory status continued to worsen.  On 9/13, she was transferred to [facility] for ECMO requirements. She was further treated with Plasmapheresis, Rituximab and high-dose steroids, with significant improvement. Her overall hospital course was complicated by thrombocytopenia (s/p several platelet transfusions), leukocytosis (infectious workup entirely negative- s/p Vanco and Ceftriaxone), AFIB with RVR (resolved with Metoprolol), dysphagia (requiring NGT), transaminitis (secondary to acute illness and hypotension),  non-occlusive RIJ DVT (started on Lovenox). Patient now admitted for a multidisciplinary rehab program. 10-05-23 @ 13:18    * Sustained functional improvement including progress with Wt bearing LE and improvement with ability at transfers, concentrate on prox LE muscle strengthening  * Making further progress, increased ambulatory distance with walker up to 60 ft  * Urinary frequency- UA with moderate Leuks- started on Bactrim (since DCd)- await UCx   * Fever- Started on Zosyn - await CXR, RVP, blood cultures  * Await Pulm re-eval and ID consult     #Interstitial Lung Disease and Mixed connective tissue disease  - Gait Instability, ADL impairments and Functional impairments: start Comprehensive Rehab Program of PT/OT/SLP - 3 hours a day, 5 days a week  - P&O as needed   - Non-specific Interstitial Lung Disease  - Requiring ECMO   - Improvement s/p Plasmapheresis, Rituximab and high- dose steroids   - Albuterol/Ipratropium Nebulizer every 6 hours  - Mepron 1500mg daily  - PO Medrol ( due to liver dysfunction): Plan will be to taper by ~20% every 2 weeks    Medrol   64mg x 2 weeks   56mg x 2 weeks  44mg x 2 weeks   36mg x 2 weeks - Follow up Outpatient   - Plan to wean 02 as tolerated, Continue tapering oxygen,  -  CT Chest noted 11/10---Resp f/u review  11/14, appreciated  They reports sustained improvement, clinical (normal oxy sats on R/A, sustained progress with oxy tapering), and radiological (no acute findings on recent CT Chest, rather residual chronic infiltrative diesease noted, with recommendation to repeat CT after Acute rehab treatment     #Fever  - Recent UTI on Bactrim (since DCd)  - Await UCx  - 101.4 fever overnight (12/4-12/5)  - Lactate of 2.5- 2000mL fluid bolus, per hospitalist   - Repeat lactate post bolus  - Start Zosyn IVPB   - Await blood cultures, RVP, CXR  - Await ID consult and Pulm re-eval (no respiratory symptoms)    #Knee buckling  - 12/1: S/p guided fall with physical therapy  - Back muscle strain with catching self on RW   - Lidocaine patch to back and BL deltoids  - Ibuprofen 400mg x1 dose     #Experiencing R mandible and occipital numbness, with associated hair loss- outpatient follow up with Rheumatology    #Posttnasal drip--ENT review 11/7, declined scope, continue flonase     #pAFIB/Rt Ij thrombus  - Metoprolol 25mg BID and lovenox  --- Eliquis 5mg BID - tolerating well     #Chronic Tinnitus   - ENT review and recs appreciate, Patient already made ENT appointment for f/u    #Lymphedema both legs -chronic and Rt IJ thrombus  - ACE Wrap BL LEs  - BL LE doppler negative for DVT  - BL UE doppler with chronic thrombotic changes in RIJ     #Transaminitis --LFT Down trending   - Secondary to acute illness and hypotension at [facility]  - Outpatient follow up     #Leucocytosis--steroid related, continue monitoring     #Neuropathy  - Gabapentin 300 mg BID, will consider increasing dose, increase to TID 11/10  --Work on motor strengthening, priority for UE as preferred by patient   - Vit b12 >2,000 in 9/2023  --Will consider Rhuematology f/u if needed    #Sleep/Mood  - Melatonin 6mg at HS  - Psych rec Xanax 0.25mg PRN    #Skin  - Skin: Left lower abdomen ruptured blister 3.0 x 1.0, stage 2 pressure injury to right buttock 4 x1 and left buttock 3x1, stage 2 pressure injury ti right inner thigh 0.2 x 0.2, right groin wound 10.5 x 2.0,   Wound care review, Left groin wound 11/15--- 0.6x1.8.0.1cm, no slough, healthy base  -- MAD to skin folds- Nystatin to submammary and abdominal pannus BID  -- MAD to L groin- cleanse with NS, Triad cream daily  -- Mad to R groin- Nystatin powder daily   -- R groin wound-- aquacel packing, Triad to periwound, Allevyn foam- daily and PRN   - Pressure injury/Skin: OOB to Chair, PT/OT   - Offload pressure, low air loss support surfaces, T&P every 2 hours   --Wound care consult-10/9 -Multiple wounds--perineal and buttock/sacral, recs appreciated   --Suggest Continue treatments as below:   Twice daily & PRN Normal Saline Cleanse of abdominal pannus & breast folds, perineal, Left & Right inner buttock erosions.  Pat thoroughly dry.   Apply Desitin generously twice daily (& PRN) to perineal, buttocks, and left groin erosions, leave open to air.    Apply Nystatin powder to abdominal pannus and breast folds.  Suggest use of Interdry cloths in folds to 'wick' moisture.  Suggest daily Normal Saline Cleanse of right groin surgical wound, pat dry.  Apply Cavillon film barrier to intact periwound skin.  Place Aquacell strip over open area, cover with foam dressing.  - Wound care following     #Pain Mgmt   - Tylenol PRN   - Gabapentin 300mg at HS     #GI/Bowel Mgmt/Hx of alternating bowel movements  - Pantoprazole  - PRN: Maalox   --Lactobacillus BID     #/Bladder Mgmt   -Spontaneously voiding   - UA (11/3) negative  - 12/4: Urinary frequency- UA with moderate Leuks- started on Bactrim (since DCd)  - Await UCx  - Started on Zosyn IVPB     #FEN   #Dysphagia  - Diet - Minced & Moist  [CC]    - Dysphagia- SLP evaluation     #Health maintenance  - Folic Acid   - MVI  - Ocean nasal spray    # Difficulty with access to peripheral veins-- Blood draws from Left arm Medline     #Precautions / PROPHYLAXIS:   - Falls  - ortho: Weight bearing status: WBAT   - Lungs: Aspiration, Incentive Spirometer   - DVT PPX: Eliquis 5 mg BID   ----------------------------------------  11/28 --Labs CBC mild anemia, normal renal function, LFT elevated, downtrending overall unremarkable   ----------------------------------------  Liaison with other providers/agencies    PEER TO PEER with Dr Tripp (patient's insurance company)  * Peer to Peer completed, insurance approval retrospectively given to cover from 11/14 to 11/20, Insurance co awaits updated notes to determine further approval of coverage   SW team to send updated clinical and functional notes to medical insurance company  ----------------------------------------  IDT conference on 12/5  Social Work: Awaiting insurance response on clinicals  SLP: --   OT: Setup for eating/grooming. Sup for UBD. Min A for LBD. Mod A x2 for shower transfer. CG with SB for toilet transfer. Mod A x1 for toileting/hygiene.  PT: Min A for sit to supine. Mod A for supine to sit. Max A for transfers. Min A x1 for stand pivot transfer with RW. Amb 65 ft with bariatric RW and WCF with min A. 6 inch step mod A x2.   RT: Limited participation.   DME: Bariatric RW, WC, drop arm commode  Barriers: New fevers  TDD: 12/13/23 to home.  ----------------------------------------  OUTPATIENT/FOLLOW UP:    Trae Whitney  Pulmonary Disease  [address], [address] [place] [address]  Phone: [phone]  Fax: [phone]  Follow Up Time: 2 weeks    Ryanne Allen  Rheumatology  [address] [address]  Phone: [phone]  Fax: [phone]  Follow Up Time: 1 week    Kyle Pierce  Internal Medicine  [address] [place] [address], [address]  [place] [address]  Phone: [phone]  Fax: [phone]  Follow Up Time: 2 weeks    Addy Powers  Pulmonary Disease  410 [facility], [address] [id number]  Phone: [phone]  Fax: [phone]  Follow Up Time:     Kwame Hawley  Critical Care Medicine  410 [facility], [address] [address]  Phone: [phone]  Fax: [phone]  Follow Up Time:     Neena Borrego  Rheumatology  [address] [place] [address], [address]  [place] [address]  Phone: [phone]  Fax: [phone]  Follow Up Time:    [facility]  [facility] [address]  Scheduled Appointment: 09/13/2023  2:40 PM     Assessment/Plan:  ALIZA FOLEY is a 62 year old female with PMH of pAFIB and sciatica; who presented to [facility] ED with SOB, and was found to have groundglass opacities and interstitial lung disease. She was treated with empiric Vancomycin and Zosyn. She underwent a bronchoscopy with bronchoalveolar lavage and pulse steroids. She was given IV diuretics for increased pulmonary congestion, but her respiratory status continued to worsen.  On 9/13, she was transferred to [facility] for ECMO requirements. She was further treated with Plasmapheresis, Rituximab and high-dose steroids, with significant improvement. Her overall hospital course was complicated by thrombocytopenia (s/p several platelet transfusions), leukocytosis (infectious workup entirely negative- s/p Vanco and Ceftriaxone), AFIB with RVR (resolved with Metoprolol), dysphagia (requiring NGT), transaminitis (secondary to acute illness and hypotension),  non-occlusive RIJ DVT (started on Lovenox). Patient now admitted for a multidisciplinary rehab program. 10-05-23 @ 13:18    * Sustained functional improvement including progress with Wt bearing LE and improvement with ability at transfers, concentrate on prox LE muscle strengthening  * Making further progress, increased ambulatory distance with walker up to 60 ft  * Urinary frequency- UA with moderate Leuks- started on Bactrim (since DCd)- await UCx   * Fever- Started on Zosyn - await CXR, RVP, blood cultures  * Await Pulm re-eval and ID consult     #Interstitial Lung Disease and Mixed connective tissue disease  - Gait Instability, ADL impairments and Functional impairments: start Comprehensive Rehab Program of PT/OT/SLP - 3 hours a day, 5 days a week  - P&O as needed   - Non-specific Interstitial Lung Disease  - Requiring ECMO   - Improvement s/p Plasmapheresis, Rituximab and high- dose steroids   - Albuterol/Ipratropium Nebulizer every 6 hours  - Mepron 1500mg daily  - PO Medrol ( due to liver dysfunction): Plan will be to taper by ~20% every 2 weeks    Medrol   64mg x 2 weeks   56mg x 2 weeks  44mg x 2 weeks   36mg x 2 weeks - Follow up Outpatient   - Plan to wean 02 as tolerated, Continue tapering oxygen,  -  CT Chest noted 11/10---Resp f/u review  11/14, appreciated  They reports sustained improvement, clinical (normal oxy sats on R/A, sustained progress with oxy tapering), and radiological (no acute findings on recent CT Chest, rather residual chronic infiltrative diesease noted, with recommendation to repeat CT after Acute rehab treatment     #Fever  - Recent UTI on Bactrim (since DCd)  - Await UCx  - 101.4 fever overnight (12/4-12/5)  - Lactate of 2.5- 2000mL fluid bolus, per hospitalist   - Repeat lactate post bolus  - Start Zosyn IVPB   - Await blood cultures, RVP, CXR  - Await ID consult and Pulm re-eval (no respiratory symptoms)    #Knee buckling  - 12/1: S/p guided fall with physical therapy  - Back muscle strain with catching self on RW   - Lidocaine patch to back and BL deltoids  - Ibuprofen 400mg x1 dose     #Experiencing R mandible and occipital numbness, with associated hair loss- outpatient follow up with Rheumatology    #Posttnasal drip--ENT review 11/7, declined scope, continue flonase     #pAFIB/Rt Ij thrombus  - Metoprolol 25mg BID and lovenox  --- Eliquis 5mg BID - tolerating well     #Chronic Tinnitus   - ENT review and recs appreciate, Patient already made ENT appointment for f/u    #Lymphedema both legs -chronic and Rt IJ thrombus  - ACE Wrap BL LEs  - BL LE doppler negative for DVT  - BL UE doppler with chronic thrombotic changes in RIJ     #Transaminitis --LFT Down trending   - Secondary to acute illness and hypotension at [facility]  - Outpatient follow up     #Leucocytosis--steroid related, continue monitoring     #Neuropathy  - Gabapentin 300 mg BID, will consider increasing dose, increase to TID 11/10  --Work on motor strengthening, priority for UE as preferred by patient   - Vit b12 >2,000 in 9/2023  --Will consider Rhuematology f/u if needed    #Sleep/Mood  - Melatonin 6mg at HS  - Psych rec Xanax 0.25mg PRN    #Skin  - Skin: Left lower abdomen ruptured blister 3.0 x 1.0, stage 2 pressure injury to right buttock 4 x1 and left buttock 3x1, stage 2 pressure injury ti right inner thigh 0.2 x 0.2, right groin wound 10.5 x 2.0,   Wound care review, Left groin wound 11/15--- 0.6x1.8.0.1cm, no slough, healthy base  -- MAD to skin folds- Nystatin to submammary and abdominal pannus BID  -- MAD to L groin- cleanse with NS, Triad cream daily  -- Mad to R groin- Nystatin powder daily   -- R groin wound-- aquacel packing, Triad to periwound, Allevyn foam- daily and PRN   - Pressure injury/Skin: OOB to Chair, PT/OT   - Offload pressure, low air loss support surfaces, T&P every 2 hours   --Wound care consult-10/9 -Multiple wounds--perineal and buttock/sacral, recs appreciated   --Suggest Continue treatments as below:   Twice daily & PRN Normal Saline Cleanse of abdominal pannus & breast folds, perineal, Left & Right inner buttock erosions.  Pat thoroughly dry.   Apply Desitin generously twice daily (& PRN) to perineal, buttocks, and left groin erosions, leave open to air.    Apply Nystatin powder to abdominal pannus and breast folds.  Suggest use of Interdry cloths in folds to 'wick' moisture.  Suggest daily Normal Saline Cleanse of right groin surgical wound, pat dry.  Apply Cavillon film barrier to intact periwound skin.  Place Aquacell strip over open area, cover with foam dressing.  - Wound care following     #Pain Mgmt   - Tylenol PRN   - Gabapentin 300mg at HS     #GI/Bowel Mgmt/Hx of alternating bowel movements  - Pantoprazole  - PRN: Maalox   --Lactobacillus BID     #/Bladder Mgmt   -Spontaneously voiding   - UA (11/3) negative  - 12/4: Urinary frequency- UA with moderate Leuks- started on Bactrim (since DCd)  - Await UCx  - Started on Zosyn IVPB     #FEN   #Dysphagia  - Diet - Minced & Moist  [CC]    - Dysphagia- SLP evaluation     #Health maintenance  - Folic Acid   - MVI  - Ocean nasal spray    # Difficulty with access to peripheral veins-- Blood draws from Left arm Medline     #Precautions / PROPHYLAXIS:   - Falls  - ortho: Weight bearing status: WBAT   - Lungs: Aspiration, Incentive Spirometer   - DVT PPX: Eliquis 5 mg BID   ----------------------------------------  11/28 --Labs CBC mild anemia, normal renal function, LFT elevated, downtrending overall unremarkable   ----------------------------------------  Liaison with other providers/agencies    PEER TO PEER with Dr Tripp (patient's insurance company)  * Peer to Peer completed, insurance approval retrospectively given to cover from 11/14 to 11/20, Insurance co awaits updated notes to determine further approval of coverage   SW team to send updated clinical and functional notes to medical insurance company  ----------------------------------------  IDT conference on 12/5  Social Work: Awaiting insurance response on clinicals  SLP: --   OT: Setup for eating/grooming. Sup for UBD. Min A for LBD. Mod A x2 for shower transfer. CG with SB for toilet transfer. Mod A x1 for toileting/hygiene.  PT: Min A for sit to supine. Mod A for supine to sit. Max A for transfers. Min A x1 for stand pivot transfer with RW. Amb 65 ft with bariatric RW and WCF with min A. 6 inch step mod A x2.   RT: Limited participation.   DME: Bariatric RW, WC, drop arm commode  Barriers: New fevers  TDD: 12/13/23 to home.  ----------------------------------------  OUTPATIENT/FOLLOW UP:    Trae Whitney  Pulmonary Disease  [address], [address] [place] [address]  Phone: [phone]  Fax: [phone]  Follow Up Time: 2 weeks    Ryanne Allen  Rheumatology  [address] [address]  Phone: [phone]  Fax: [phone]  Follow Up Time: 1 week    Kyle Pierce  Internal Medicine  [address] [place] [address], [address]  [place] [address]  Phone: [phone]  Fax: [phone]  Follow Up Time: 2 weeks    Addy Powers  Pulmonary Disease  410 [facility], [address] [id number]  Phone: [phone]  Fax: [phone]  Follow Up Time:     Kwame Hawley  Critical Care Medicine  410 [facility], [address] [address]  Phone: [phone]  Fax: [phone]  Follow Up Time:     Neena Borrego  Rheumatology  [address] [place] [address], [address]  [place] [address]  Phone: [phone]  Fax: [phone]  Follow Up Time:    [facility]  [facility] [address]  Scheduled Appointment: 09/13/2023  2:40 PM

## 2023-12-05 NOTE — PROVIDER CONTACT NOTE (OTHER) - RECOMMENDATIONS
Titrate O2 as ordered. MD to see patient.
NP to speak with patient to further discuss plan of care
pt requested imodium
MD ordered Loperamide and Simethicone. STAT labs CBCw/diff & PT/INR Ordered. Lovenox and cathartics to be held.
Physician/Nursing/Patient

## 2023-12-05 NOTE — CONSULT NOTE ADULT - SUBJECTIVE AND OBJECTIVE BOX
HPI:   Patient is a 62y female with a past history of A Fib, obesity,who was transferred to [facility] on 10/5 after a 6 week hospital stay for respiratory distress.  She was evaluated PTA by Rheum and pulmonary for abnormal CXR and was hospitalized and labeled as ILD in late August at [facility].  She received broad spectrum antibiotics with a negative Bronch for infection.  She was transferred to [facility], required ECMO, received high dose steroids, plasmaphoresis, and rituximab for ILD.  She was sent to rehab on 10/5, she has been stable, has been on atovaquone for PCP prophylaxis and has been receiving steroids.  She has c/o frequency, has been febrile past 24 hours. Mild headache with fever, no GI symptoms. She is on nasal oxygen, no worsening of dyspnea. No sputum production.  She was born and raised in [place], no family history of significance, no chest pain or sputum production.  Zosyn given as well as bactrim for a possible UTI.    REVIEW OF SYSTEMS:  All other review of systems negative (Comprehensive ROS)    PAST MEDICAL & SURGICAL HISTORY:  Afib      Sciatica      Interstitial lung disease      Lymphedema          Allergies    No Known Allergies    Intolerances        Antimicrobials Day #  :day 1  atovaquone  Suspension 1500 milliGRAM(s) Oral daily  trimethoprim  160 mG/sulfamethoxazole 800 mG 1 Tablet(s) Oral two times a day    Other Medications:  acetaminophen     Tablet .. 650 milliGRAM(s) Oral every 6 hours PRN  albuterol/ipratropium for Nebulization 3 milliLiter(s) Nebulizer every 6 hours PRN  ALPRAZolam 0.25 milliGRAM(s) Oral every 6 hours PRN  aluminum hydroxide/magnesium hydroxide/simethicone Suspension 30 milliLiter(s) Oral every 4 hours PRN  ammonium lactate 12% Lotion 1 Application(s) Topical every 12 hours PRN  apixaban 5 milliGRAM(s) Oral every 12 hours  artificial  tears Solution 1 Drop(s) Both EYES every 12 hours  ascorbic acid 500 milliGRAM(s) Oral daily  baclofen 5 milliGRAM(s) Oral every 8 hours PRN  benzocaine/menthol Lozenge 1 Lozenge Oral two times a day PRN  benzonatate 100 milliGRAM(s) Oral three times a day PRN  bisacodyl 5 milliGRAM(s) Oral daily PRN  dextrose 5%. 1000 milliLiter(s) IV Continuous <Continuous>  dextrose 5%. 1000 milliLiter(s) IV Continuous <Continuous>  dextrose 50% Injectable 12.5 Gram(s) IV Push once  dextrose 50% Injectable 25 Gram(s) IV Push once  dextrose 50% Injectable 25 Gram(s) IV Push once  dextrose Oral Gel 15 Gram(s) Oral once PRN  folic acid 1 milliGRAM(s) Oral daily  gabapentin 300 milliGRAM(s) Oral three times a day  glucagon  Injectable 1 milliGRAM(s) IntraMuscular once  hydrocortisone hemorrhoidal Suppository 1 Suppository(s) Rectal two times a day PRN  lactobacillus acidophilus 1 Tablet(s) Oral two times a day with meals  lidocaine   4% Patch 1 Patch Transdermal <User Schedule>  lidocaine   4% Patch 2 Patch Transdermal <User Schedule>  loperamide 2 milliGRAM(s) Oral three times a day PRN  melatonin 6 milliGRAM(s) Oral at bedtime  methylPREDNISolone 36 milliGRAM(s) Oral daily  metoprolol tartrate 12.5 milliGRAM(s) Oral two times a day  multivitamin 1 Tablet(s) Oral daily  nystatin Powder 1 Application(s) Topical two times a day  pantoprazole    Tablet 40 milliGRAM(s) Oral before breakfast  polyethylene glycol 3350 17 Gram(s) Oral <User Schedule>  SIMETHICONE SOFTGELS 250 milliGRAM(s),SIMETHICONE SOFTGELS 250 milliGRAM(s) 250 milliGRAM(s) Oral three times a day PRN  sodium chloride 0.65% Nasal 1 Spray(s) Both Nostrils every 8 hours PRN  sodium chloride 0.9% Bolus 1000 milliLiter(s) IV Bolus once  sodium chloride 0.9% lock flush 5 milliLiter(s) IV Push two times a day  sodium chloride 0.9%. 1000 milliLiter(s) IV Continuous <Continuous>  zinc oxide 40% Paste 1 Application(s) Topical three times a day      FAMILY HISTORY:      SOCIAL HISTORY:  Smoking:  x   ETOH: x    Drug Use: x   Single     T(F): 100.4 (12-05-23 @ 11:50), Max: 101.4 (12-04-23 @ 22:20)  HR: 97 (12-05-23 @ 11:50)  BP: 102/65 (12-05-23 @ 11:50)  RR: 16 (12-05-23 @ 11:50)  SpO2: 93% (12-05-23 @ 11:50)  Wt(kg): --    PHYSICAL EXAM:  General: alert, no acute distress, obese, on nasal oxygne  Eyes:  anicteric, no conjunctival injection, no discharge  Oropharynx: no lesions or injection 	  Neck: supple, without adenopathy  Lungs: clear to auscultation  Heart: regular rate and rhythm; no murmur, rubs or gallops  Abdomen: soft, nondistended, nontender, without mass or organomegaly  Skin: no lesions  Extremities: no clubbing, cyanosis, trace  edema  Neurologic: alert, oriented, moves all extremities    LAB RESULTS:                        9.8    8.23  )-----------( 230      ( 05 Dec 2023 11:00 )             31.0     12-05    133<L>  |  98  |  21  ----------------------------<  151<H>  3.5   |  25  |  0.81    Ca    9.0      05 Dec 2023 11:00    TPro  5.1<L>  /  Alb  2.7<L>  /  TBili  0.9  /  DBili  x   /  AST  20  /  ALT  47<H>  /  AlkPhos  127<H>  12-05    LIVER FUNCTIONS - ( 05 Dec 2023 11:00 )  Alb: 2.7 g/dL / Pro: 5.1 g/dL / ALK PHOS: 127 U/L / ALT: 47 U/L / AST: 20 U/L / GGT: x           UA 14 wbc        MICROBIOLOGY:  RECENT CULTURES:        RADIOLOGY REVIEWED:  < from: CT Chest No Cont (11.10.23 @ 11:36) >    IMPRESSION:  Since prior evaluation June 2023 there are new predominantly   peripherally distributed reticular opacities identified within the   bilateral lung parenchyma, which appear less prominent than demonstrated   on August 26, 2023. No significant traction bronchiectasis or   honeycombing.          < end of copied text >   HPI:   Patient is a 62y female with a past history of A Fib, obesity,who was transferred to [facility] on 10/5 after a 6 week hospital stay for respiratory distress.  She was evaluated PTA by Rheum and pulmonary for abnormal CXR and was hospitalized and labeled as ILD in late August at [facility].  She received broad spectrum antibiotics with a negative Bronch for infection.  She was transferred to [facility], required ECMO, received high dose steroids, plasmaphoresis, and rituximab for ILD.  She was sent to rehab on 10/5, she has been stable, has been on atovaquone for PCP prophylaxis and has been receiving steroids.  She has c/o frequency, has been febrile past 24 hours. Mild headache with fever, no GI symptoms. She is on nasal oxygen, no worsening of dyspnea. No sputum production.  She was born and raised in [place], no family history of significance, no chest pain or sputum production.  Zosyn given as well as bactrim for a possible UTI.    REVIEW OF SYSTEMS:  All other review of systems negative (Comprehensive ROS)    PAST MEDICAL & SURGICAL HISTORY:  Afib      Sciatica      Interstitial lung disease      Lymphedema          Allergies    No Known Allergies    Intolerances        Antimicrobials Day #  :day 1  atovaquone  Suspension 1500 milliGRAM(s) Oral daily  trimethoprim  160 mG/sulfamethoxazole 800 mG 1 Tablet(s) Oral two times a day    Other Medications:  acetaminophen     Tablet .. 650 milliGRAM(s) Oral every 6 hours PRN  albuterol/ipratropium for Nebulization 3 milliLiter(s) Nebulizer every 6 hours PRN  ALPRAZolam 0.25 milliGRAM(s) Oral every 6 hours PRN  aluminum hydroxide/magnesium hydroxide/simethicone Suspension 30 milliLiter(s) Oral every 4 hours PRN  ammonium lactate 12% Lotion 1 Application(s) Topical every 12 hours PRN  apixaban 5 milliGRAM(s) Oral every 12 hours  artificial  tears Solution 1 Drop(s) Both EYES every 12 hours  ascorbic acid 500 milliGRAM(s) Oral daily  baclofen 5 milliGRAM(s) Oral every 8 hours PRN  benzocaine/menthol Lozenge 1 Lozenge Oral two times a day PRN  benzonatate 100 milliGRAM(s) Oral three times a day PRN  bisacodyl 5 milliGRAM(s) Oral daily PRN  dextrose 5%. 1000 milliLiter(s) IV Continuous <Continuous>  dextrose 5%. 1000 milliLiter(s) IV Continuous <Continuous>  dextrose 50% Injectable 12.5 Gram(s) IV Push once  dextrose 50% Injectable 25 Gram(s) IV Push once  dextrose 50% Injectable 25 Gram(s) IV Push once  dextrose Oral Gel 15 Gram(s) Oral once PRN  folic acid 1 milliGRAM(s) Oral daily  gabapentin 300 milliGRAM(s) Oral three times a day  glucagon  Injectable 1 milliGRAM(s) IntraMuscular once  hydrocortisone hemorrhoidal Suppository 1 Suppository(s) Rectal two times a day PRN  lactobacillus acidophilus 1 Tablet(s) Oral two times a day with meals  lidocaine   4% Patch 1 Patch Transdermal <User Schedule>  lidocaine   4% Patch 2 Patch Transdermal <User Schedule>  loperamide 2 milliGRAM(s) Oral three times a day PRN  melatonin 6 milliGRAM(s) Oral at bedtime  methylPREDNISolone 36 milliGRAM(s) Oral daily  metoprolol tartrate 12.5 milliGRAM(s) Oral two times a day  multivitamin 1 Tablet(s) Oral daily  nystatin Powder 1 Application(s) Topical two times a day  pantoprazole    Tablet 40 milliGRAM(s) Oral before breakfast  polyethylene glycol 3350 17 Gram(s) Oral <User Schedule>  SIMETHICONE SOFTGELS 250 milliGRAM(s),SIMETHICONE SOFTGELS 250 milliGRAM(s) 250 milliGRAM(s) Oral three times a day PRN  sodium chloride 0.65% Nasal 1 Spray(s) Both Nostrils every 8 hours PRN  sodium chloride 0.9% Bolus 1000 milliLiter(s) IV Bolus once  sodium chloride 0.9% lock flush 5 milliLiter(s) IV Push two times a day  sodium chloride 0.9%. 1000 milliLiter(s) IV Continuous <Continuous>  zinc oxide 40% Paste 1 Application(s) Topical three times a day      FAMILY HISTORY:      SOCIAL HISTORY:  Smoking:  x   ETOH: x    Drug Use: x   Single     T(F): 100.4 (12-05-23 @ 11:50), Max: 101.4 (12-04-23 @ 22:20)  HR: 97 (12-05-23 @ 11:50)  BP: 102/65 (12-05-23 @ 11:50)  RR: 16 (12-05-23 @ 11:50)  SpO2: 93% (12-05-23 @ 11:50)  Wt(kg): --    PHYSICAL EXAM:  General: alert, no acute distress, obese, on nasal oxygne  Eyes:  anicteric, no conjunctival injection, no discharge  Oropharynx: no lesions or injection 	  Neck: supple, without adenopathy  Lungs: clear to auscultation  Heart: regular rate and rhythm; no murmur, rubs or gallops  Abdomen: soft, nondistended, nontender, without mass or organomegaly  Skin: no lesions  Extremities: no clubbing, cyanosis, trace  edema  Neurologic: alert, oriented, moves all extremities    LAB RESULTS:                        9.8    8.23  )-----------( 230      ( 05 Dec 2023 11:00 )             31.0     12-05    133<L>  |  98  |  21  ----------------------------<  151<H>  3.5   |  25  |  0.81    Ca    9.0      05 Dec 2023 11:00    TPro  5.1<L>  /  Alb  2.7<L>  /  TBili  0.9  /  DBili  x   /  AST  20  /  ALT  47<H>  /  AlkPhos  127<H>  12-05    LIVER FUNCTIONS - ( 05 Dec 2023 11:00 )  Alb: 2.7 g/dL / Pro: 5.1 g/dL / ALK PHOS: 127 U/L / ALT: 47 U/L / AST: 20 U/L / GGT: x           UA 14 wbc        MICROBIOLOGY:  RECENT CULTURES:        RADIOLOGY REVIEWED:  < from: CT Chest No Cont (11.10.23 @ 11:36) >    IMPRESSION:  Since prior evaluation June 2023 there are new predominantly   peripherally distributed reticular opacities identified within the   bilateral lung parenchyma, which appear less prominent than demonstrated   on August 26, 2023. No significant traction bronchiectasis or   honeycombing.          < end of copied text >   HPI:   Patient is a 62y female with a past history of A Fib, obesity,who was transferred to [facility] on 10/5 after a 6 week hospital stay for respiratory distress.  She was evaluated PTA by Rheum and pulmonary for abnormal CXR and was hospitalized and labeled as ILD in late August at [facility].  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  She was transferred to [facility], required ECMO, received high dose steroids, plasmaphoresis, and rituximab for ILD.  She was sent to rehab on 10/5, she has been stable, has been on atovaquone for PCP prophylaxis and has been receiving steroids.  She has c/o frequency, has been febrile past 24 hours. Mild headache with fever, no GI symptoms. She is on nasal oxygen, no worsening of dyspnea. No sputum production.  She was born and raised in [place], no family history of significance, no chest pain or sputum production.  Zosyn given as well as bactrim for a possible UTI.    REVIEW OF SYSTEMS:  All other review of systems negative (Comprehensive ROS)    PAST MEDICAL & SURGICAL HISTORY:  Afib      Sciatica      Interstitial lung disease      Lymphedema          Allergies    No Known Allergies    Intolerances        Antimicrobials Day #  :day 1  atovaquone  Suspension 1500 milliGRAM(s) Oral daily  trimethoprim  160 mG/sulfamethoxazole 800 mG 1 Tablet(s) Oral two times a day    Other Medications:  acetaminophen     Tablet .. 650 milliGRAM(s) Oral every 6 hours PRN  albuterol/ipratropium for Nebulization 3 milliLiter(s) Nebulizer every 6 hours PRN  ALPRAZolam 0.25 milliGRAM(s) Oral every 6 hours PRN  aluminum hydroxide/magnesium hydroxide/simethicone Suspension 30 milliLiter(s) Oral every 4 hours PRN  ammonium lactate 12% Lotion 1 Application(s) Topical every 12 hours PRN  apixaban 5 milliGRAM(s) Oral every 12 hours  artificial  tears Solution 1 Drop(s) Both EYES every 12 hours  ascorbic acid 500 milliGRAM(s) Oral daily  baclofen 5 milliGRAM(s) Oral every 8 hours PRN  benzocaine/menthol Lozenge 1 Lozenge Oral two times a day PRN  benzonatate 100 milliGRAM(s) Oral three times a day PRN  bisacodyl 5 milliGRAM(s) Oral daily PRN  dextrose 5%. 1000 milliLiter(s) IV Continuous <Continuous>  dextrose 5%. 1000 milliLiter(s) IV Continuous <Continuous>  dextrose 50% Injectable 12.5 Gram(s) IV Push once  dextrose 50% Injectable 25 Gram(s) IV Push once  dextrose 50% Injectable 25 Gram(s) IV Push once  dextrose Oral Gel 15 Gram(s) Oral once PRN  folic acid 1 milliGRAM(s) Oral daily  gabapentin 300 milliGRAM(s) Oral three times a day  glucagon  Injectable 1 milliGRAM(s) IntraMuscular once  hydrocortisone hemorrhoidal Suppository 1 Suppository(s) Rectal two times a day PRN  lactobacillus acidophilus 1 Tablet(s) Oral two times a day with meals  lidocaine   4% Patch 1 Patch Transdermal <User Schedule>  lidocaine   4% Patch 2 Patch Transdermal <User Schedule>  loperamide 2 milliGRAM(s) Oral three times a day PRN  melatonin 6 milliGRAM(s) Oral at bedtime  methylPREDNISolone 36 milliGRAM(s) Oral daily  metoprolol tartrate 12.5 milliGRAM(s) Oral two times a day  multivitamin 1 Tablet(s) Oral daily  nystatin Powder 1 Application(s) Topical two times a day  pantoprazole    Tablet 40 milliGRAM(s) Oral before breakfast  polyethylene glycol 3350 17 Gram(s) Oral <User Schedule>  SIMETHICONE SOFTGELS 250 milliGRAM(s),SIMETHICONE SOFTGELS 250 milliGRAM(s) 250 milliGRAM(s) Oral three times a day PRN  sodium chloride 0.65% Nasal 1 Spray(s) Both Nostrils every 8 hours PRN  sodium chloride 0.9% Bolus 1000 milliLiter(s) IV Bolus once  sodium chloride 0.9% lock flush 5 milliLiter(s) IV Push two times a day  sodium chloride 0.9%. 1000 milliLiter(s) IV Continuous <Continuous>  zinc oxide 40% Paste 1 Application(s) Topical three times a day      FAMILY HISTORY:      SOCIAL HISTORY:  Smoking:  x   ETOH: x    Drug Use: x   Single     T(F): 100.4 (12-05-23 @ 11:50), Max: 101.4 (12-04-23 @ 22:20)  HR: 97 (12-05-23 @ 11:50)  BP: 102/65 (12-05-23 @ 11:50)  RR: 16 (12-05-23 @ 11:50)  SpO2: 93% (12-05-23 @ 11:50)  Wt(kg): --    PHYSICAL EXAM:  General: alert, no acute distress, obese, on nasal oxygne  Eyes:  anicteric, no conjunctival injection, no discharge  Oropharynx: no lesions or injection 	  Neck: supple, without adenopathy  Lungs: clear to auscultation  Heart: regular rate and rhythm; no murmur, rubs or gallops  Abdomen: soft, nondistended, nontender, without mass or organomegaly  Skin: no lesions  Extremities: no clubbing, cyanosis, trace  edema  Neurologic: alert, oriented, moves all extremities    LAB RESULTS:                        9.8    8.23  )-----------( 230      ( 05 Dec 2023 11:00 )             31.0     12-05    133<L>  |  98  |  21  ----------------------------<  151<H>  3.5   |  25  |  0.81    Ca    9.0      05 Dec 2023 11:00    TPro  5.1<L>  /  Alb  2.7<L>  /  TBili  0.9  /  DBili  x   /  AST  20  /  ALT  47<H>  /  AlkPhos  127<H>  12-05    LIVER FUNCTIONS - ( 05 Dec 2023 11:00 )  Alb: 2.7 g/dL / Pro: 5.1 g/dL / ALK PHOS: 127 U/L / ALT: 47 U/L / AST: 20 U/L / GGT: x           UA 14 wbc        MICROBIOLOGY:  RECENT CULTURES:        RADIOLOGY REVIEWED:  < from: CT Chest No Cont (11.10.23 @ 11:36) >    IMPRESSION:  Since prior evaluation June 2023 there are new predominantly   peripherally distributed reticular opacities identified within the   bilateral lung parenchyma, which appear less prominent than demonstrated   on August 26, 2023. No significant traction bronchiectasis or   honeycombing.          < end of copied text >   HPI:   Patient is a 62y female with a past history of A Fib, obesity,who was transferred to [facility] on 10/5 after a 6 week hospital stay for respiratory distress.  She was evaluated PTA by Rheum and pulmonary for abnormal CXR and was hospitalized and labeled as ILD in late August at [facility].  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  She was transferred to [facility], required ECMO, received high dose steroids, plasmaphoresis, and rituximab for ILD.  She was sent to rehab on 10/5, she has been stable, has been on atovaquone for PCP prophylaxis and has been receiving steroids.  She has c/o frequency, has been febrile past 24 hours. Mild headache with fever, no GI symptoms. She is on nasal oxygen, no worsening of dyspnea. No sputum production.  She was born and [place] in [place], no family history of significance, no chest pain or sputum production.  Zosyn given as well as bactrim for a possible UTI.    REVIEW OF SYSTEMS:  All other review of systems negative (Comprehensive ROS)    PAST MEDICAL & SURGICAL HISTORY:  Afib      Sciatica      Interstitial lung disease      Lymphedema          Allergies    No Known Allergies    Intolerances        Antimicrobials Day #  :day 1  atovaquone  Suspension 1500 milliGRAM(s) Oral daily  trimethoprim  160 mG/sulfamethoxazole 800 mG 1 Tablet(s) Oral two times a day    Other Medications:  acetaminophen     Tablet .. 650 milliGRAM(s) Oral every 6 hours PRN  albuterol/ipratropium for Nebulization 3 milliLiter(s) Nebulizer every 6 hours PRN  ALPRAZolam 0.25 milliGRAM(s) Oral every 6 hours PRN  aluminum hydroxide/magnesium hydroxide/simethicone Suspension 30 milliLiter(s) Oral every 4 hours PRN  ammonium lactate 12% Lotion 1 Application(s) Topical every 12 hours PRN  apixaban 5 milliGRAM(s) Oral every 12 hours  artificial  tears Solution 1 Drop(s) Both EYES every 12 hours  ascorbic acid 500 milliGRAM(s) Oral daily  baclofen 5 milliGRAM(s) Oral every 8 hours PRN  benzocaine/menthol Lozenge 1 Lozenge Oral two times a day PRN  benzonatate 100 milliGRAM(s) Oral three times a day PRN  bisacodyl 5 milliGRAM(s) Oral daily PRN  dextrose 5%. 1000 milliLiter(s) IV Continuous <Continuous>  dextrose 5%. 1000 milliLiter(s) IV Continuous <Continuous>  dextrose 50% Injectable 12.5 Gram(s) IV Push once  dextrose 50% Injectable 25 Gram(s) IV Push once  dextrose 50% Injectable 25 Gram(s) IV Push once  dextrose Oral Gel 15 Gram(s) Oral once PRN  folic acid 1 milliGRAM(s) Oral daily  gabapentin 300 milliGRAM(s) Oral three times a day  glucagon  Injectable 1 milliGRAM(s) IntraMuscular once  hydrocortisone hemorrhoidal Suppository 1 Suppository(s) Rectal two times a day PRN  lactobacillus acidophilus 1 Tablet(s) Oral two times a day with meals  lidocaine   4% Patch 1 Patch Transdermal <User Schedule>  lidocaine   4% Patch 2 Patch Transdermal <User Schedule>  loperamide 2 milliGRAM(s) Oral three times a day PRN  melatonin 6 milliGRAM(s) Oral at bedtime  methylPREDNISolone 36 milliGRAM(s) Oral daily  metoprolol tartrate 12.5 milliGRAM(s) Oral two times a day  multivitamin 1 Tablet(s) Oral daily  nystatin Powder 1 Application(s) Topical two times a day  pantoprazole    Tablet 40 milliGRAM(s) Oral before breakfast  polyethylene glycol 3350 17 Gram(s) Oral <User Schedule>  SIMETHICONE SOFTGELS 250 milliGRAM(s),SIMETHICONE SOFTGELS 250 milliGRAM(s) 250 milliGRAM(s) Oral three times a day PRN  sodium chloride 0.65% Nasal 1 Spray(s) Both Nostrils every 8 hours PRN  sodium chloride 0.9% Bolus 1000 milliLiter(s) IV Bolus once  sodium chloride 0.9% lock flush 5 milliLiter(s) IV Push two times a day  sodium chloride 0.9%. 1000 milliLiter(s) IV Continuous <Continuous>  zinc oxide 40% Paste 1 Application(s) Topical three times a day      FAMILY HISTORY:      SOCIAL HISTORY:  Smoking:  x   ETOH: x    Drug Use: x   Single     T(F): 100.4 (12-05-23 @ 11:50), Max: 101.4 (12-04-23 @ 22:20)  HR: 97 (12-05-23 @ 11:50)  BP: 102/65 (12-05-23 @ 11:50)  RR: 16 (12-05-23 @ 11:50)  SpO2: 93% (12-05-23 @ 11:50)  Wt(kg): --    PHYSICAL EXAM:  General: alert, no acute distress, obese, on nasal oxygne  Eyes:  anicteric, no conjunctival injection, no discharge  Oropharynx: no lesions or injection 	  Neck: supple, without adenopathy  Lungs: clear to auscultation  Heart: regular rate and rhythm; no murmur, rubs or gallops  Abdomen: soft, nondistended, nontender, without mass or organomegaly  Skin: no lesions  Extremities: no clubbing, cyanosis, trace  edema  Neurologic: alert, oriented, moves all extremities    LAB RESULTS:                        9.8    8.23  )-----------( 230      ( 05 Dec 2023 11:00 )             31.0     12-05    133<L>  |  98  |  21  ----------------------------<  151<H>  3.5   |  25  |  0.81    Ca    9.0      05 Dec 2023 11:00    TPro  5.1<L>  /  Alb  2.7<L>  /  TBili  0.9  /  DBili  x   /  AST  20  /  ALT  47<H>  /  AlkPhos  127<H>  12-05    LIVER FUNCTIONS - ( 05 Dec 2023 11:00 )  Alb: 2.7 g/dL / Pro: 5.1 g/dL / ALK PHOS: 127 U/L / ALT: 47 U/L / AST: 20 U/L / GGT: x           UA 14 wbc        MICROBIOLOGY:  RECENT CULTURES:        RADIOLOGY REVIEWED:  < from: CT Chest No Cont (11.10.23 @ 11:36) >    IMPRESSION:  Since prior evaluation June 2023 there are new predominantly   peripherally distributed reticular opacities identified within the   bilateral lung parenchyma, which appear less prominent than demonstrated   on August 26, 2023. No significant traction bronchiectasis or   honeycombing.          < end of copied text >

## 2023-12-05 NOTE — CONSULT NOTE ADULT - CONSULT REQUESTED DATE/TIME
05-Dec-2023 14:29
07-Nov-2023 18:02
06-Oct-2023 12:55
07-Oct-2023 10:57
12-Oct-2023 10:30
29-Nov-2023 18:43
06-Oct-2023 08:48
06-Oct-2023 17:18
13-Oct-2023 16:36
09-Oct-2023 16:09

## 2023-12-05 NOTE — CONSULT NOTE ADULT - REASON FOR ADMISSION
Interstitial Lung Disease

## 2023-12-05 NOTE — CONSULT NOTE ADULT - CONSULT REASON
dried blood in nostrils, h/o epistaxis.
fever
History of IJ thrombus, AF, role of anticoagulation
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
Non specific interstitial lung disease s/p ECMO now on NC oxygen
Interstitial Lung Disease
postnasal drip, choking sensation
Supportive therapy
tinnitus and ear fullness.
Vascular

## 2023-12-05 NOTE — PROGRESS NOTE ADULT - SUBJECTIVE AND OBJECTIVE BOX
62 year old female with PMH of pAFIB and sciatica; who presented to [facility] ED with SOB. She has been experiencing chronic SOB and non-productive cough for several months and was worked up in [place] where she had a CT scan which resulted negative.  She has been evaluated by Pulmonology and Rheumatology and was ruled out for lupus and immunological disorders.  She recently went to Urgent Care due to SOB with ambulating short distances (12-15 feet), and an XR was concerning for BL LL infiltrates. While at [facility],  CXR and CTA were significant for ground glass opacities, and interstitial lung disease, respectively. She was treated with empiric Vancomycin and Zosyn. She underwent a bronchoscopy with bronchoalveolar lavage and pulse steroids. She was given IV diuretics for increased pulmonary congestion, but her respiratory status continued to worsen.  On 9/13, she was transferred to [facility] for ECMO requirements. She was further treated with Plasmapheresis, Rituximab and high-dose steroids, with significant improvement.   Her overall hospital course was complicated by thrombocytopenia (s/p several platelet transfusions), leukocytosis (infectious workup entirely negative- s/p Vanco and Ceftriaxone), AFIB with RVR (resolved with Metoprolol), dysphagia (requiring NGT), transaminitis (secondary to acute illness and hypotension),  non-occlusive RIJ DVT (started on Lovenox). PM&R was consulted and deemed her an appropriate candidate for IRF. She was medically stabilized and cleared for discharge to [facility].     seen at the bedside, events noted- temp 101 since last night  c/o feeling tired and tachycardia.     Vital Signs Last 24 Hrs  T(C): 38 (05 Dec 2023 11:50), Max: 38.6 (04 Dec 2023 22:20)  T(F): 100.4 (05 Dec 2023 11:50), Max: 101.4 (04 Dec 2023 22:20)  HR: 97 (05 Dec 2023 11:50) (97 - 115)  BP: 102/65 (05 Dec 2023 11:50) (96/59 - 124/62)  BP(mean): --  RR: 16 (05 Dec 2023 11:50) (16 - 16)  SpO2: 93% (05 Dec 2023 11:50) (90% - 97%)    Parameters below as of 05 Dec 2023 11:50  Patient On (Oxygen Delivery Method): nasal cannula  O2 Flow (L/min): 2      GENERAL- NAD  EAR/NOSE/MOUTH/THROAT -  MMM  EYES- MADAI, conjunctiva and Sclera clear- icterus+ b/l  NECK- supple  RESPIRATORY-  clear to auscultation bilaterally, non laboured breathing  CARDIOVASCULAR - SIS2, RRR  GI - soft NT BS present  EXTREMITIES-  b/l LE edema- improving   NEUROLOGY-  b/l UE AND LE weakness  PSYCHIATRY- AAO X 3                  9.8                  133  | 25   | 21           8.23  >-----------< 230     ------------------------< 151                   31.0                 3.5  | 98   | 0.81                                         Ca 9.0   Mg x     Ph x        Lactate, Blood: 2.5: Elevated lactate. Consider ordering follow-up lactate to trend. mmol/L (12.05.23 @ 11:00)      < from: TTE W or WO Ultrasound Enhancing Agent (10.04.23 @ 10:58) >   1. Left ventricular cavity is normally sized. Left ventricular wall thickness is normal. Left ventricular systolic function is normal with an ejection fraction of 64 % by Barnes's method of disks.    < end of copied text >    MEDICATIONS  (STANDING):  apixaban 5 milliGRAM(s) Oral every 12 hours  artificial  tears Solution 1 Drop(s) Both EYES every 12 hours  ascorbic acid 500 milliGRAM(s) Oral daily  atovaquone  Suspension 1500 milliGRAM(s) Oral daily  folic acid 1 milliGRAM(s) Oral daily  gabapentin 300 milliGRAM(s) Oral three times a day  glucagon  Injectable 1 milliGRAM(s) IntraMuscular once  lactobacillus acidophilus 1 Tablet(s) Oral two times a day with meals  lidocaine   4% Patch 2 Patch Transdermal <User Schedule>  lidocaine   4% Patch 1 Patch Transdermal <User Schedule>  melatonin 6 milliGRAM(s) Oral at bedtime  methylPREDNISolone 36 milliGRAM(s) Oral daily  metoprolol tartrate 12.5 milliGRAM(s) Oral two times a day  multivitamin 1 Tablet(s) Oral daily  nystatin Powder 1 Application(s) Topical two times a day  pantoprazole    Tablet 40 milliGRAM(s) Oral before breakfast  polyethylene glycol 3350 17 Gram(s) Oral <User Schedule>  sodium chloride 0.9% Bolus 1000 milliLiter(s) IV Bolus once  sodium chloride 0.9% lock flush 5 milliLiter(s) IV Push two times a day  sodium chloride 0.9%. 1000 milliLiter(s) (100 mL/Hr) IV Continuous <Continuous>  trimethoprim  160 mG/sulfamethoxazole 800 mG 1 Tablet(s) Oral two times a day  zinc oxide 40% Paste 1 Application(s) Topical three times a day    MEDICATIONS  (PRN):  acetaminophen     Tablet .. 650 milliGRAM(s) Oral every 6 hours PRN Temp greater or equal to 38C (100.4F)  albuterol/ipratropium for Nebulization 3 milliLiter(s) Nebulizer every 6 hours PRN Shortness of Breath and/or Wheezing  ALPRAZolam 0.25 milliGRAM(s) Oral every 6 hours PRN Anxiety  aluminum hydroxide/magnesium hydroxide/simethicone Suspension 30 milliLiter(s) Oral every 4 hours PRN Dyspepsia  ammonium lactate 12% Lotion 1 Application(s) Topical every 12 hours PRN BL feet dryness  baclofen 5 milliGRAM(s) Oral every 8 hours PRN Musculoskeletal Pain  benzocaine/menthol Lozenge 1 Lozenge Oral two times a day PRN Sore Throat  benzonatate 100 milliGRAM(s) Oral three times a day PRN Cough  bisacodyl 5 milliGRAM(s) Oral daily PRN Constipation  dextrose Oral Gel 15 Gram(s) Oral once PRN Blood Glucose LESS THAN 70 milliGRAM(s)/deciliter  hydrocortisone hemorrhoidal Suppository 1 Suppository(s) Rectal two times a day PRN Hemorrhoids  loperamide 2 milliGRAM(s) Oral three times a day PRN Diarrhea  SIMETHICONE SOFTGELS 250 milliGRAM(s),SIMETHICONE SOFTGELS 250 milliGRAM(s) 250 milliGRAM(s) Oral three times a day PRN for gas  sodium chloride 0.65% Nasal 1 Spray(s) Both Nostrils every 8 hours PRN Nasal Congestion   62 year old female with PMH of pAFIB and sciatica; who presented to [facility] ED with SOB. She has been experiencing chronic SOB and non-productive cough for several months and was worked up in [place] where she had a CT scan which resulted negative.  She has been evaluated by Pulmonology and Rheumatology and was ruled out for lupus and immunological disorders.  She recently went to Urgent Care due to SOB with ambulating short distances (12-15 feet), and an XR was concerning for BL LL infiltrates. While at [facility],  CXR and CTA were significant for ground glass opacities, and interstitial lung disease, respectively. She was treated with empiric Vancomycin and Zosyn. She underwent a bronchoscopy with bronchoalveolar lavage and pulse steroids. She was given IV diuretics for increased pulmonary congestion, but her respiratory status continued to worsen.  On 9/13, she was transferred to [facility] for ECMO requirements. She was further treated with Plasmapheresis, Rituximab and high-dose steroids, with significant improvement.   Her overall hospital course was complicated by thrombocytopenia (s/p several platelet transfusions), leukocytosis (infectious workup entirely negative- s/p Vanco and Ceftriaxone), AFIB with RVR (resolved with Metoprolol), dysphagia (requiring NGT), transaminitis (secondary to acute illness and hypotension),  non-occlusive RIJ DVT (started on Lovenox). PM&R was consulted and deemed her an appropriate candidate for IRF. She was medically stabilized and cleared for discharge to [facility].     seen at the bedside, events noted- temp 101 since last night  c/o feeling tired and tachycardia.     Vital Signs Last 24 Hrs  T(C): 38 (05 Dec 2023 11:50), Max: 38.6 (04 Dec 2023 22:20)  T(F): 100.4 (05 Dec 2023 11:50), Max: 101.4 (04 Dec 2023 22:20)  HR: 97 (05 Dec 2023 11:50) (97 - 115)  BP: 102/65 (05 Dec 2023 11:50) (96/59 - 124/62)  BP(mean): --  RR: 16 (05 Dec 2023 11:50) (16 - 16)  SpO2: 93% (05 Dec 2023 11:50) (90% - 97%)    Parameters below as of 05 Dec 2023 11:50  Patient On (Oxygen Delivery Method): nasal cannula  O2 Flow (L/min): 2      GENERAL- NAD  EAR/NOSE/MOUTH/THROAT -  MMM  EYES- MADAI, conjunctiva and Sclera clear- icterus+ b/l  NECK- supple  RESPIRATORY-  clear to auscultation bilaterally, non laboured breathing  CARDIOVASCULAR - SIS2, RRR  GI - soft NT BS present  EXTREMITIES-  b/l LE edema- improving   NEUROLOGY-  b/l UE AND LE weakness  PSYCHIATRY- AAO X 3                  9.8                  133  | 25   | 21           8.23  >-----------< 230     ------------------------< 151                   31.0                 3.5  | 98   | 0.81                                         Ca 9.0   Mg x     Ph x        Lactate, Blood: 2.5: Elevated lactate. Consider ordering follow-up lactate to trend. mmol/L (12.05.23 @ 11:00)      < from: TTE W or WO Ultrasound Enhancing Agent (10.04.23 @ 10:58) >   1. Left ventricular cavity is normally sized. Left ventricular wall thickness is normal. Left ventricular systolic function is normal with an ejection fraction of 64 % by Barnes's method of disks.    < end of copied text >    MEDICATIONS  (STANDING):  apixaban 5 milliGRAM(s) Oral every 12 hours  artificial  tears Solution 1 Drop(s) Both EYES every 12 hours  ascorbic acid 500 milliGRAM(s) Oral daily  atovaquone  Suspension 1500 milliGRAM(s) Oral daily  folic acid 1 milliGRAM(s) Oral daily  gabapentin 300 milliGRAM(s) Oral three times a day  glucagon  Injectable 1 milliGRAM(s) IntraMuscular once  lactobacillus acidophilus 1 Tablet(s) Oral two times a day with meals  lidocaine   4% Patch 2 Patch Transdermal <User Schedule>  lidocaine   4% Patch 1 Patch Transdermal <User Schedule>  melatonin 6 milliGRAM(s) Oral at bedtime  methylPREDNISolone 36 milliGRAM(s) Oral daily  metoprolol tartrate 12.5 milliGRAM(s) Oral two times a day  multivitamin 1 Tablet(s) Oral daily  nystatin Powder 1 Application(s) Topical two times a day  pantoprazole    Tablet 40 milliGRAM(s) Oral before breakfast  polyethylene glycol 3350 17 Gram(s) Oral <User Schedule>  sodium chloride 0.9% Bolus 1000 milliLiter(s) IV Bolus once  sodium chloride 0.9% lock flush 5 milliLiter(s) IV Push two times a day  sodium chloride 0.9%. 1000 milliLiter(s) (100 mL/Hr) IV Continuous <Continuous>  trimethoprim  160 mG/sulfamethoxazole 800 mG 1 Tablet(s) Oral two times a day  zinc oxide 40% Paste 1 Application(s) Topical three times a day    MEDICATIONS  (PRN):  acetaminophen     Tablet .. 650 milliGRAM(s) Oral every 6 hours PRN Temp greater or equal to 38C (100.4F)  albuterol/ipratropium for Nebulization 3 milliLiter(s) Nebulizer every 6 hours PRN Shortness of Breath and/or Wheezing  ALPRAZolam 0.25 milliGRAM(s) Oral every 6 hours PRN Anxiety  aluminum hydroxide/magnesium hydroxide/simethicone Suspension 30 milliLiter(s) Oral every 4 hours PRN Dyspepsia  ammonium lactate 12% Lotion 1 Application(s) Topical every 12 hours PRN BL feet dryness  baclofen 5 milliGRAM(s) Oral every 8 hours PRN Musculoskeletal Pain  benzocaine/menthol Lozenge 1 Lozenge Oral two times a day PRN Sore Throat  benzonatate 100 milliGRAM(s) Oral three times a day PRN Cough  bisacodyl 5 milliGRAM(s) Oral daily PRN Constipation  dextrose Oral Gel 15 Gram(s) Oral once PRN Blood Glucose LESS THAN 70 milliGRAM(s)/deciliter  hydrocortisone hemorrhoidal Suppository 1 Suppository(s) Rectal two times a day PRN Hemorrhoids  loperamide 2 milliGRAM(s) Oral three times a day PRN Diarrhea  SIMETHICONE SOFTGELS 250 milliGRAM(s),SIMETHICONE SOFTGELS 250 milliGRAM(s) 250 milliGRAM(s) Oral three times a day PRN for gas  sodium chloride 0.65% Nasal 1 Spray(s) Both Nostrils every 8 hours PRN Nasal Congestion

## 2023-12-05 NOTE — PROVIDER CONTACT NOTE (OTHER) - SITUATION
Patient complained of chills, oral temp 101.4, rectal temp refused, patient positive for UTI,  started antibiotics during day shift.
Patient noted with increased rectal bleeding.
Patient refusing IV fluids and ABX (Blood Lactate 2,5)
PT states I missed a dose of my steroid," I am anxious I will go into a coma like at [facility] [place]." PT states missed steroid is causing desaturation, requested O2 status to be evaluated.
pt has passed bowels 5 times pt requests imodium

## 2023-12-05 NOTE — PROVIDER CONTACT NOTE (OTHER) - ASSESSMENT
[FreeTextEntry1] : Mr. Cuenca is a 68 year-old male with a history of DVTs, type II DM, hypertension, and paroxysmal atrial fibrillation who was admitted in July 2019 with SOB and LE edema and was found to be in afib with poor rate control. He presents today to discuss management of his AF.  He reports that he was diagnosed approximately 3-4 years ago, but has had infrequent episodes.  He denies ever requiring a DC cardioversion and has been managed with a rate control strategy.  He denies any history of stroke, chest pain, palpitations, dizziness, and syncope.  He has been compliant with his medication regimen.  
Pt noted with increased bleeding of ruptured hemmorrhoids and irritated perineal skin r/t excessive BM's. Around 11:20pm noticed increase in bleeding to area. VS stable 112/75-98% O2 NC 3L - 67HR - 98.0F- RR 16. partner at bedside expressing concerns, Contacted MD for consult
See flow sheets. PT spo2 remains between 89-97% via O2 titration as ordered. PT denies SOB, Dyspnea, no accessory muscle use, no labored breathing observed by RN.
Vitals /62, , SPO2 90% room air, temp 101.4 RR 16. Patient complained of chills.
After educating patient on plan of care f IV fluids and ABX with ANM, patient and caretaker verbalized their understanding of education provided, continued to decline treatment until NP discusses.

## 2023-12-05 NOTE — PROGRESS NOTE ADULT - ASSESSMENT
Physical Examination:  GENERAL:                Alert, Oriented, No acute distress.     PULM:                     Bilateral air entry,  poor air entry at bases No Rales, No Rhonchi, No Wheezing  CVS:                         S1, S2,  No Murmur   EXT:                        Trace non pitting edema, nontender, No Cyanosis or Clubbing   NEURO:                  Alert, oriented, interactive, bilateral upper and lower extremity weakness   PSYC:                      Calm, + Insight.      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 S/p pulse steroids, PLEX (9/15, 9/18, 9/20) and Rituximab (9/16 & 10/3). Course c/b severe leukopenia s/p filgastrim, shock liver with slow to resolved hyperbilirubinemia, RIJ DVT, oropharyngeal dysphagia, and recurrent SVT. Repeat CT chest on 9/30 with markedly improved bilateral groundglass opacities with resolved lung consolidations. Now in acute rehab on 4 lpm NC oxygen and tapering dose of medrol.     Problem List:  1. Interstitial lung disease   2. Mixed connective tissue disease   3. Acute hypoxia  4. RIJ DVT     Plan:  Noted fever- pan culture, ID consult should be consiered, check CXR, RVP, unsure if respiratory origin as patient ha sno complaints.  on PCP ppx on atovaquone   f/u pan culture    - Cont. Methylprednisolone PO, taper as per rheumatology recs. from [facility]   - Continue atovaquone for PCP prophylaxis  - S/p Rituximab 9/16 and 10/3  - S/p PLEX during MICU stay  - Repeat CT scan reviewed, findings do not suggest acute infection but chronic infiltrative disease that is improving, no acute intervention required, will recommend out patient pulmonary follow up with repeat CT after rehab stay.  - Cont. anticoagulation for DVT associated with ECMO cannula  - Consider incentive spirometry   - Care per primary team  - Discussed with spouse at bedside   - PT OOB as tolerated  - Outpatient pulmonary and rheumatology follow up upon discharge   Physical Examination:  GENERAL:                Alert, Oriented, No acute distress.     PULM:                     Bilateral air entry,  poor air entry at bases No Rales, No Rhonchi, No Wheezing  CVS:                         S1, S2,  No Murmur   EXT:                        Trace non pitting edema, nontender, No Cyanosis or Clubbing   NEURO:                  Alert, oriented, interactive, bilateral upper and lower extremity weakness   PSYC:                      Calm, + Insight.      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 S/p pulse steroids, PLEX (9/15, 9/18, 9/20) and Rituximab (9/16 & 10/3). Course c/b severe leukopenia s/p filgastrim, shock liver with slow to resolved hyperbilirubinemia, RIJ DVT, oropharyngeal dysphagia, and recurrent SVT. Repeat CT chest on 9/30 with markedly improved bilateral groundglass opacities with resolved lung consolidations. Now in acute rehab on 4 lpm NC oxygen and tapering dose of medrol.     Problem List:  1. Interstitial lung disease   2. Mixed connective tissue disease   3. Acute hypoxia  4. RIJ DVT     Plan:  Noted fever- pan culture, ID consult should be consiered, check CXR, RVP, unsure if respiratory origin as patient ha sno complaints.  on PCP ppx on atovaquone   f/u pan culture    - Cont. Methylprednisolone PO, taper as per rheumatology recs. from [facility]   - Continue atovaquone for PCP prophylaxis  - S/p Rituximab 9/16 and 10/3  - S/p PLEX during MICU stay  - Repeat CT scan reviewed, findings do not suggest acute infection but chronic infiltrative disease that is improving, no acute intervention required, will recommend out patient pulmonary follow up with repeat CT after rehab stay.  - Cont. anticoagulation for DVT associated with ECMO cannula  - Consider incentive spirometry   - Care per primary team  - Discussed with spouse at bedside   - PT OOB as tolerated  - Outpatient pulmonary and rheumatology follow up upon discharge   Physical Examination:  GENERAL:                Alert, Oriented, No acute distress.     PULM:                     Bilateral air entry,  poor air entry at bases No Rales, No Rhonchi, No Wheezing  CVS:                         S1, S2,  No Murmur   EXT:                        Trace non pitting edema, nontender, No Cyanosis or Clubbing   NEURO:                  Alert, oriented, interactive, bilateral upper and lower extremity weakness   PSYC:                      Calm, + Insight.      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 S/p pulse steroids, PLEX (9/15, 9/18, 9/20) and Rituximab (9/16 & 10/3). Course c/b severe leukopenia s/p filgastrim, shock liver with slow to resolved hyperbilirubinemia, RIJ DVT, oropharyngeal dysphagia, and recurrent SVT. Repeat CT chest on 9/30 with markedly improved bilateral groundglass opacities with resolved lung consolidations. Now in acute rehab on 4 lpm NC oxygen and tapering dose of medrol.     Problem List:  1. Interstitial lung disease   2. Mixed connective tissue disease   3. Acute hypoxia  4. RIJ DVT     Plan:  Noted fever- pan culture, ID consult should be considered as has immunosuppression,  check CXR, RVP, unsure if respiratory origin as patient ha sno complaints.  on PCP ppx on atovaquone   f/u pan culture  Antibiotics started.       - Cont. Methylprednisolone PO, taper as per rheumatology recs. from [facility]   - Continue atovaquone for PCP prophylaxis  - S/p Rituximab 9/16 and 10/3  - S/p PLEX during MICU stay  - Repeat CT scan reviewed, findings do not suggest acute infection but chronic infiltrative disease that is improving, no acute intervention required, will recommend out patient pulmonary follow up with repeat CT after rehab stay.  - Cont. anticoagulation for DVT associated with ECMO cannula  - Consider incentive spirometry   - Care per primary team  - Discussed with spouse at bedside   - PT OOB as tolerated  - Outpatient pulmonary and rheumatology follow up upon discharge   Physical Examination:  GENERAL:                Alert, Oriented, No acute distress.     PULM:                     Bilateral air entry,  poor air entry at bases No Rales, No Rhonchi, No Wheezing  CVS:                         S1, S2,  No Murmur   EXT:                        Trace non pitting edema, nontender, No Cyanosis or Clubbing   NEURO:                  Alert, oriented, interactive, bilateral upper and lower extremity weakness   PSYC:                      Calm, + Insight.      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 S/p pulse steroids, PLEX (9/15, 9/18, 9/20) and Rituximab (9/16 & 10/3). Course c/b severe leukopenia s/p filgastrim, shock liver with slow to resolved hyperbilirubinemia, RIJ DVT, oropharyngeal dysphagia, and recurrent SVT. Repeat CT chest on 9/30 with markedly improved bilateral groundglass opacities with resolved lung consolidations. Now in acute rehab on 4 lpm NC oxygen and tapering dose of medrol.     Problem List:  1. Interstitial lung disease   2. Mixed connective tissue disease   3. Acute hypoxia  4. RIJ DVT     Plan:  Noted fever- pan culture, ID consult should be considered as has immunosuppression,  check CXR, RVP, unsure if respiratory origin as patient ha sno complaints.  on PCP ppx on atovaquone   f/u pan culture  Antibiotics started.       - Cont. Methylprednisolone PO, taper as per rheumatology recs. from [facility]   - Continue atovaquone for PCP prophylaxis  - S/p Rituximab 9/16 and 10/3  - S/p PLEX during MICU stay  - Repeat CT scan reviewed, findings do not suggest acute infection but chronic infiltrative disease that is improving, no acute intervention required, will recommend out patient pulmonary follow up with repeat CT after rehab stay.  - Cont. anticoagulation for DVT associated with ECMO cannula  - Consider incentive spirometry   - Care per primary team  - Discussed with spouse at bedside   - PT OOB as tolerated  - Outpatient pulmonary and rheumatology follow up upon discharge

## 2023-12-05 NOTE — PROVIDER CONTACT NOTE (OTHER) - REASON
Temp of 101.4 oral
multiple bm
PT states "I am anxious about my O2 levels"
increased bleeding in anal area
Patient refusing IV fluids and ABX (Blood Lactate 2,5)

## 2023-12-05 NOTE — CONSULT NOTE ADULT - CONSULT REQUESTED BY NAME
Marie López MD
Liliana Acevedo
Dr López
Dr. López
Dr. López
Primary team
Marie López MD
Marie López MD
[email or web address]
Medicine
details…

## 2023-12-05 NOTE — CHART NOTE - NSCHARTNOTEFT_GEN_A_CORE
IDT conference on 12/5  Social Work: Awaiting insurance response on clinicals  SLP: --   OT: Setup for eating/grooming. Sup for UBD. Min A for LBD. Mod A x2 for shower transfer. CG with SB for toilet transfer. Mod A x1 for toileting/hygiene.  PT: Min A for sit to supine. Mod A for supine to sit. Max A for transfers. Min A x1 for stand pivot transfer with RW. Amb 65 ft with bariatric RW and WCF with min A. 6 inch step mod A x2.   RT: Limited participation.   DME: Bariatric RW, WC, drop arm commode  Barriers: New fevers  TDD: 12/13/23 to home

## 2023-12-05 NOTE — CONSULT NOTE ADULT - ASSESSMENT
Patient is a 62y female with a past history of A Fib, obesity,who was transferred to [facility] on 10/5 after a 6 week hospital stay for respiratory distress.  She was evaluated PTA by Rheum and pulmonary for abnormal CXR and was hospitalized and labeled as ILD in late August at [facility].  She received broad spectrum antibiotics with a negative Bronch for infection.  She was transferred to [facility], required ECMO, received high dose steroids, plasmaphoresis, and rituximab for ILD.  She was sent to rehab on 10/5, she has been stable, has been on atovaquone for PCP prophylaxis and has been receiving steroids.  She has c/o frequency, has been febrile past 24 hours. Mild headache with fever, no GI symptoms. She is on nasal oxygen, no worsening of dyspnea. No sputum production.  She was born and raised in [place], no family history of significance, no chest pain or sputum production.  Zosyn given as well as bactrim for a possible UTI.  Hence  61 yo female with history of ILD now febrile after high dose sterids and Rituximab,  Her exam is non focal. Her RVP is negative,her steroids have been tapered off.  She is anticoagulated with apixaban and her UA shows on 14 wbc.  The differential is broad, infection vs non infectious fever.  Her prior quant TB was anergic, prior blood cultures and BAL AFB cultures negative  Suggest:  1 Blood and urine cultures  2 Bactrim okay for now  3 Ct of chest  4 Additional w/u pending course . Patient is a 62y female with a past history of A Fib, obesity,who was transferred to [facility] on 10/5 after a 6 week hospital stay for respiratory distress.  She was evaluated PTA by Rheum and pulmonary for abnormal CXR and was hospitalized and labeled as ILD in late August at [facility].  She received broad spectrum antibiotics with a negative Bronch for infection.  She was transferred to [facility], required ECMO, received high dose steroids, plasmaphoresis, and rituximab for ILD.  She was sent to rehab on 10/5, she has been stable, has been on atovaquone for PCP prophylaxis and has been receiving steroids.  She has c/o frequency, has been febrile past 24 hours. Mild headache with fever, no GI symptoms. She is on nasal oxygen, no worsening of dyspnea. No sputum production.  She was born and raised in [place], no family history of significance, no chest pain or sputum production.  Zosyn given as well as bactrim for a possible UTI.  Hence  63 yo female with history of ILD now febrile after high dose sterids and Rituximab,  Her exam is non focal. Her RVP is negative,her steroids have been tapered off.  She is anticoagulated with apixaban and her UA shows on 14 wbc.  The differential is broad, infection vs non infectious fever.  Her prior quant TB was anergic, prior blood cultures and BAL AFB cultures negative  Suggest:  1 Blood and urine cultures  2 Bactrim okay for now  3 Ct of chest  4 Additional w/u pending course . Patient is a 62y female with a past history of A Fib, obesity,who was transferred to [facility] on 10/5 after a 6 week hospital stay for respiratory distress.  She was evaluated PTA by Rheum and pulmonary for abnormal CXR and was hospitalized and labeled as ILD in late August at [facility].  She received broad spectrum antibiotics with a negative Bronch for infection.She had a positive ARAINA, was diagnosed as a MCT ILD.  She was transferred to [facility], required ECMO, received high dose steroids, plasmaphoresis, and rituximab for ILD.  She was sent to rehab on 10/5, she has been stable, has been on atovaquone for PCP prophylaxis and has been receiving steroids.  She has c/o frequency, has been febrile past 24 hours. Mild headache with fever, no GI symptoms. She is on nasal oxygen, no worsening of dyspnea. No sputum production.  She was born and [place] in [place], no family history of significance, no chest pain or sputum production.  Zosyn given as well as bactrim for a possible UTI.  Hence  61 yo female with history of ILD now febrile after high dose sterids and Rituximab,  Her exam is non focal. Her RVP is negative,her steroids have been tapered off.  She is anticoagulated with apixaban and her UA shows on 14 wbc.  The differential is broad, infection vs non infectious fever.  Her prior quant TB was anergic, prior blood cultures and BAL AFB cultures negative  Suggest:  1 Blood and urine cultures, RVP  2 Bactrim okay for now  3 Ct of chest  4 Additional w/u pending course . Patient is a 62y female with a past history of A Fib, obesity,who was transferred to [facility] on 10/5 after a 6 week hospital stay for respiratory distress.  She was evaluated PTA by Rheum and pulmonary for abnormal CXR and was hospitalized and labeled as ILD in late August at [facility].  She received broad spectrum antibiotics with a negative Bronch for infection.She had a positive ARIANA, was diagnosed as a MCT ILD.  She was transferred to [facility], required ECMO, received high dose steroids, plasmaphoresis, and rituximab for ILD.  She was sent to rehab on 10/5, she has been stable, has been on atovaquone for PCP prophylaxis and has been receiving steroids.  She has c/o frequency, has been febrile past 24 hours. Mild headache with fever, no GI symptoms. She is on nasal oxygen, no worsening of dyspnea. No sputum production.  She was born and raised in [place], no family history of significance, no chest pain or sputum production.  Zosyn given as well as bactrim for a possible UTI.  Hence  61 yo female with history of ILD now febrile after high dose sterids and Rituximab,  Her exam is non focal. Her RVP is negative,her steroids have been tapered off.  She is anticoagulated with apixaban and her UA shows on 14 wbc.  The differential is broad, infection vs non infectious fever.  Her prior quant TB was anergic, prior blood cultures and BAL AFB cultures negative  Suggest:  1 Blood and urine cultures, RVP  2 Bactrim okay for now  3 Ct of chest  4 Additional w/u pending course .

## 2023-12-05 NOTE — PROGRESS NOTE ADULT - SUBJECTIVE AND OBJECTIVE BOX
Follow-up Pulmonary Progress Note  Chief Complaint : Interstitial lung disease    patient seen and examined  comfortable  noted fever mild dysuria  denies cp, sob, palp, cough  uses n/c o2 qhs  on room air during day        Allergies :No Known Allergies      PAST MEDICAL & SURGICAL HISTORY:  Afib    Sciatica    Interstitial lung disease    Lymphedema        Medications:  MEDICATIONS  (STANDING):  apixaban 5 milliGRAM(s) Oral every 12 hours  artificial  tears Solution 1 Drop(s) Both EYES every 12 hours  ascorbic acid 500 milliGRAM(s) Oral daily  atovaquone  Suspension 1500 milliGRAM(s) Oral daily  dextrose 5%. 1000 milliLiter(s) (100 mL/Hr) IV Continuous <Continuous>  dextrose 5%. 1000 milliLiter(s) (50 mL/Hr) IV Continuous <Continuous>  dextrose 50% Injectable 12.5 Gram(s) IV Push once  dextrose 50% Injectable 25 Gram(s) IV Push once  dextrose 50% Injectable 25 Gram(s) IV Push once  folic acid 1 milliGRAM(s) Oral daily  gabapentin 300 milliGRAM(s) Oral three times a day  glucagon  Injectable 1 milliGRAM(s) IntraMuscular once  lactobacillus acidophilus 1 Tablet(s) Oral two times a day with meals  lidocaine   4% Patch 1 Patch Transdermal <User Schedule>  lidocaine   4% Patch 2 Patch Transdermal <User Schedule>  melatonin 6 milliGRAM(s) Oral at bedtime  methylPREDNISolone 36 milliGRAM(s) Oral daily  metoprolol tartrate 12.5 milliGRAM(s) Oral two times a day  multivitamin 1 Tablet(s) Oral daily  nystatin Powder 1 Application(s) Topical two times a day  pantoprazole    Tablet 40 milliGRAM(s) Oral before breakfast  polyethylene glycol 3350 17 Gram(s) Oral <User Schedule>  sodium chloride 0.9% Bolus 1000 milliLiter(s) IV Bolus once  sodium chloride 0.9% lock flush 5 milliLiter(s) IV Push two times a day  sodium chloride 0.9%. 1000 milliLiter(s) (100 mL/Hr) IV Continuous <Continuous>  trimethoprim  160 mG/sulfamethoxazole 800 mG 1 Tablet(s) Oral two times a day  zinc oxide 40% Paste 1 Application(s) Topical three times a day    MEDICATIONS  (PRN):  acetaminophen     Tablet .. 650 milliGRAM(s) Oral every 6 hours PRN Temp greater or equal to 38C (100.4F)  albuterol/ipratropium for Nebulization 3 milliLiter(s) Nebulizer every 6 hours PRN Shortness of Breath and/or Wheezing  ALPRAZolam 0.25 milliGRAM(s) Oral every 6 hours PRN Anxiety  aluminum hydroxide/magnesium hydroxide/simethicone Suspension 30 milliLiter(s) Oral every 4 hours PRN Dyspepsia  ammonium lactate 12% Lotion 1 Application(s) Topical every 12 hours PRN BL feet dryness  baclofen 5 milliGRAM(s) Oral every 8 hours PRN Musculoskeletal Pain  benzocaine/menthol Lozenge 1 Lozenge Oral two times a day PRN Sore Throat  benzonatate 100 milliGRAM(s) Oral three times a day PRN Cough  bisacodyl 5 milliGRAM(s) Oral daily PRN Constipation  dextrose Oral Gel 15 Gram(s) Oral once PRN Blood Glucose LESS THAN 70 milliGRAM(s)/deciliter  hydrocortisone hemorrhoidal Suppository 1 Suppository(s) Rectal two times a day PRN Hemorrhoids  loperamide 2 milliGRAM(s) Oral three times a day PRN Diarrhea  SIMETHICONE SOFTGELS 250 milliGRAM(s),SIMETHICONE SOFTGELS 250 milliGRAM(s) 250 milliGRAM(s) Oral three times a day PRN for gas  sodium chloride 0.65% Nasal 1 Spray(s) Both Nostrils every 8 hours PRN Nasal Congestion      Antibiotics History  piperacillin/tazobactam IVPB. 3.375 Gram(s) IV Intermittent once, 12-05-23 @ 12:15, Stop order after: 1 Doses  piperacillin/tazobactam IVPB.. 3.375 Gram(s) IV Intermittent every 8 hours, 12-05-23 @ 12:14, Stop order after: 7 Days  trimethoprim  160 mG/sulfamethoxazole 800 mG 1 Tablet(s) Oral two times a day, 12-05-23 @ 18:00, Stop order after: 2 Days  trimethoprim  160 mG/sulfamethoxazole 800 mG 1 Tablet(s) Oral two times a day, 12-04-23 @ 12:47, Stop order after: 3 Days      Heme Medications       GI Medications  bisacodyl 5 milliGRAM(s) Oral daily, 12-01-23 @ 10:45,  PRN  polyethylene glycol 3350 17 Gram(s) Oral <User Schedule>, 12-01-23 @ 10:58,         LABS:                        9.8    8.23  )-----------( 230      ( 05 Dec 2023 11:00 )             31.0     12-05    133<L>  |  98  |  21  ----------------------------<  151<H>  3.5   |  25  |  0.81    Ca    9.0      05 Dec 2023 11:00    TPro  5.1<L>  /  Alb  2.7<L>  /  TBili  0.9  /  DBili  x   /  AST  20  /  ALT  47<H>  /  AlkPhos  127<H>  12-05     WBC Trend  12-05-23 @ 11:00   -  8.23  12-04-23 @ 18:25   -  8.49    Urinalysis (12.04.23 @ 11:30)   pH Urine: 6.0  Blood, Urine: Negative  Glucose Qualitative, Urine: Negative mg/dL  Color: Dark Yellow  Urine Appearance: Clear  Bilirubin: Negative  Ketone - Urine: Negative mg/dL  Specific Gravity: 1.018  Protein, Urine: Negative mg/dL  Urobilinogen: 1.0 mg/dL  Nitrite: Negative  Leukocyte Esterase Concentration: ModerateVITALS:  T(C): 38 (12-05-23 @ 11:50), Max: 38.6 (12-04-23 @ 22:20)  T(F): 100.4 (12-05-23 @ 11:50), Max: 101.4 (12-04-23 @ 22:20)  HR: 97 (12-05-23 @ 11:50) (97 - 115)  BP: 102/65 (12-05-23 @ 11:50) (96/59 - 124/62)  BP(mean): --  ABP: --  ABP(mean): --  RR: 16 (12-05-23 @ 11:50) (16 - 16)  SpO2: 93% (12-05-23 @ 11:50) (90% - 97%)  CVP(mm Hg): --  CVP(cm H2O): --    Ins and Outs

## 2023-12-05 NOTE — CHART NOTE - NSCHARTNOTEFT_GEN_A_CORE
#Fever  - Recent UTI on Bactrim (since DCd)  - Await UCx  - 101.4 fever overnight (12/4-12/5)  - Lactate of 2.5- 2000mL fluid bolus, per hospitalist   - Repeat lactate post bolus  - Start Zosyn IVPB   - Await blood cultures, RVP, CXR  - Await ID consult and Pulm re-eval (no respiratory symptoms)    NP contacted by primary RN, as patient is refusing IV fluids and ABX.  NP spoke with pt and partner at bedside.  Pt states "the last time I had IV fluids, I went into a coma." Admits that she is traumatized from prior hospital course.  NP encouraged pt, discussed risks and benefits, yet patient still refusing.   She wishes to wait for UCx and blood cx to return. She is agreeable to IV ABX unless absolutely necessary.  Case discussed with hospitalist, Dr. Sanders. Who will also speak with patient and partner.

## 2023-12-06 ENCOUNTER — APPOINTMENT (OUTPATIENT)
Dept: PULMONOLOGY | Facility: CLINIC | Age: 63
End: 2023-12-06

## 2023-12-06 LAB
LACTATE SERPL-SCNC: 3.1 MMOL/L — HIGH (ref 0.7–2)
LACTATE SERPL-SCNC: 3.1 MMOL/L — HIGH (ref 0.7–2)

## 2023-12-06 PROCEDURE — 93970 EXTREMITY STUDY: CPT | Mod: 26

## 2023-12-06 PROCEDURE — 71045 X-RAY EXAM CHEST 1 VIEW: CPT | Mod: 26

## 2023-12-06 PROCEDURE — 99233 SBSQ HOSP IP/OBS HIGH 50: CPT

## 2023-12-06 PROCEDURE — 99232 SBSQ HOSP IP/OBS MODERATE 35: CPT | Mod: GC

## 2023-12-06 RX ORDER — PIPERACILLIN AND TAZOBACTAM 4; .5 G/20ML; G/20ML
3.38 INJECTION, POWDER, LYOPHILIZED, FOR SOLUTION INTRAVENOUS EVERY 8 HOURS
Refills: 0 | Status: DISCONTINUED | OUTPATIENT
Start: 2023-12-06 | End: 2023-12-07

## 2023-12-06 RX ORDER — SODIUM CHLORIDE 9 MG/ML
1000 INJECTION INTRAMUSCULAR; INTRAVENOUS; SUBCUTANEOUS ONCE
Refills: 0 | Status: DISCONTINUED | OUTPATIENT
Start: 2023-12-06 | End: 2023-12-20

## 2023-12-06 RX ORDER — GABAPENTIN 400 MG/1
300 CAPSULE ORAL ONCE
Refills: 0 | Status: COMPLETED | OUTPATIENT
Start: 2023-12-06 | End: 2023-12-06

## 2023-12-06 RX ORDER — PIPERACILLIN AND TAZOBACTAM 4; .5 G/20ML; G/20ML
3.38 INJECTION, POWDER, LYOPHILIZED, FOR SOLUTION INTRAVENOUS ONCE
Refills: 0 | Status: COMPLETED | OUTPATIENT
Start: 2023-12-06 | End: 2023-12-06

## 2023-12-06 RX ORDER — SODIUM CHLORIDE 9 MG/ML
1000 INJECTION INTRAMUSCULAR; INTRAVENOUS; SUBCUTANEOUS
Refills: 0 | Status: DISCONTINUED | OUTPATIENT
Start: 2023-12-06 | End: 2023-12-20

## 2023-12-06 RX ADMIN — Medication 1 TABLET(S): at 21:37

## 2023-12-06 RX ADMIN — Medication 12.5 MILLIGRAM(S): at 21:37

## 2023-12-06 RX ADMIN — Medication 36 MILLIGRAM(S): at 08:35

## 2023-12-06 RX ADMIN — GABAPENTIN 300 MILLIGRAM(S): 400 CAPSULE ORAL at 08:35

## 2023-12-06 RX ADMIN — GABAPENTIN 300 MILLIGRAM(S): 400 CAPSULE ORAL at 21:30

## 2023-12-06 RX ADMIN — PIPERACILLIN AND TAZOBACTAM 25 GRAM(S): 4; .5 INJECTION, POWDER, LYOPHILIZED, FOR SOLUTION INTRAVENOUS at 16:07

## 2023-12-06 RX ADMIN — ATOVAQUONE 1500 MILLIGRAM(S): 750 SUSPENSION ORAL at 21:35

## 2023-12-06 RX ADMIN — Medication 1 TABLET(S): at 08:35

## 2023-12-06 RX ADMIN — Medication 500 MILLIGRAM(S): at 21:38

## 2023-12-06 RX ADMIN — SODIUM CHLORIDE 5 MILLILITER(S): 9 INJECTION INTRAMUSCULAR; INTRAVENOUS; SUBCUTANEOUS at 20:00

## 2023-12-06 RX ADMIN — GABAPENTIN 300 MILLIGRAM(S): 400 CAPSULE ORAL at 16:29

## 2023-12-06 RX ADMIN — Medication 1 DROP(S): at 21:36

## 2023-12-06 RX ADMIN — Medication 1 MILLIGRAM(S): at 21:37

## 2023-12-06 RX ADMIN — Medication 5 MILLIGRAM(S): at 22:04

## 2023-12-06 RX ADMIN — LIDOCAINE 1 PATCH: 4 CREAM TOPICAL at 08:36

## 2023-12-06 RX ADMIN — NYSTATIN CREAM 1 APPLICATION(S): 100000 CREAM TOPICAL at 08:39

## 2023-12-06 RX ADMIN — ZINC OXIDE 1 APPLICATION(S): 200 OINTMENT TOPICAL at 08:39

## 2023-12-06 RX ADMIN — Medication 1 TABLET(S): at 21:39

## 2023-12-06 RX ADMIN — APIXABAN 5 MILLIGRAM(S): 2.5 TABLET, FILM COATED ORAL at 21:36

## 2023-12-06 RX ADMIN — PIPERACILLIN AND TAZOBACTAM 200 GRAM(S): 4; .5 INJECTION, POWDER, LYOPHILIZED, FOR SOLUTION INTRAVENOUS at 12:32

## 2023-12-06 RX ADMIN — LIDOCAINE 1 PATCH: 4 CREAM TOPICAL at 20:36

## 2023-12-06 RX ADMIN — PANTOPRAZOLE SODIUM 40 MILLIGRAM(S): 20 TABLET, DELAYED RELEASE ORAL at 08:35

## 2023-12-06 RX ADMIN — Medication 1 DROP(S): at 08:34

## 2023-12-06 RX ADMIN — ZINC OXIDE 1 APPLICATION(S): 200 OINTMENT TOPICAL at 15:55

## 2023-12-06 RX ADMIN — APIXABAN 5 MILLIGRAM(S): 2.5 TABLET, FILM COATED ORAL at 08:35

## 2023-12-06 RX ADMIN — NYSTATIN CREAM 1 APPLICATION(S): 100000 CREAM TOPICAL at 21:36

## 2023-12-06 NOTE — PROGRESS NOTE ADULT - ASSESSMENT
62 year old female with PMH of pAFIB and sciatica; who presented to [facility] ED with SOB, and was found to have groundglass opacities and interstitial lung disease. She was treated with empiric Vancomycin and Zosyn. She underwent a bronchoscopy with bronchoalveolar lavage and pulse steroids. She was given IV diuretics for increased pulmonary congestion, but her respiratory status continued to worsen.  On 9/13, she was transferred to [facility] for ECMO requirements. She was further treated with Plasmapheresis, Rituximab and high-dose steroids, with significant improvement. Her overall hospital course was complicated by thrombocytopenia (s/p several platelet transfusions), leukocytosis (infectious workup entirely negative- s/p Vanco and Ceftriaxone), AFIB with RVR (resolved with Metoprolol), dysphagia (requiring NGT), transaminitis (secondary to acute illness and hypotension),  non-occlusive RIJ DVT (started on Lovenox). Patient now admitted for a multidisciplinary rehab program- pt/ot/dvt ppx    #Interstitial Lung Disease, s/p ECMO   - s/p Plasmapheresis, Rituximab and high- dose steroids   - Albuterol/Ipratropium Nebulizer every 6 hours  - Mepron   - PO Medrol taper- Plan will be to taper by ~20% every 2 weeks  - Repeat CT Chest in 6 weeks   - o2 support prn  - pulm and cardio consult recc noted    # fever, tachycardia, lactic acidosis, in the setting of ILD on prednisone- likely due to UTI  - BCX 2 SENT ,   -Culture Results: >100,000 CFU/ml Enterococcus faecalis sensitivity pending.  12/4/23  - was started on bactrim treated 5 days until 12/6/23- abtx changed to zosyn  - iv fluids ordered- but pt refuses  - pt refusing ivf and abtx- scared she may develop fluid overload, i explained to her risks of holding ivf and abtx in the present setting- but she say she will think about it.   - chest  ct chest - noted - no new changes   - RVP negative   - d/w ID at the beside.     #pAFIB  - Metoprolol     - DVT:  Lovenox 70mg BID- switch to Eliquis   - RIJ DVT found at ECMO cannula     #Transaminitis, jaundice  - Secondary to acute illness and hypotension at [facility]  - Trend LFTs, bili-  downtrending  - Outpatient follow up     #Sleep  - Melatonin     will follow  d/w rehab team    time spent in e/m visit 55 min      62 year old female with PMH of pAFIB and sciatica; who presented to [facility] ED with SOB, and was found to have groundglass opacities and interstitial lung disease. She was treated with empiric Vancomycin and Zosyn. She underwent a bronchoscopy with bronchoalveolar lavage and pulse steroids. She was given IV diuretics for increased pulmonary congestion, but her respiratory status continued to worsen.  On 9/13, she was transferred to [facility] for ECMO requirements. She was further treated with Plasmapheresis, Rituximab and high-dose steroids, with significant improvement. Her overall hospital course was complicated by thrombocytopenia (s/p several platelet transfusions), leukocytosis (infectious workup entirely negative- s/p Vanco and Ceftriaxone), AFIB with RVR (resolved with Metoprolol), dysphagia (requiring NGT), transaminitis (secondary to acute illness and hypotension),  non-occlusive RIJ DVT (started on Lovenox). Patient now admitted for a multidisciplinary rehab program- pt/ot/dvt ppx    #Interstitial Lung Disease, s/p ECMO   - s/p Plasmapheresis, Rituximab and high- dose steroids   - Albuterol/Ipratropium Nebulizer every 6 hours  - Mepron   - PO Medrol taper- Plan will be to taper by ~20% every 2 weeks  - Repeat CT Chest in 6 weeks   - o2 support prn  - pulm and cardio consult recc noted    # fever, tachycardia, lactic acidosis, in the setting of ILD on prednisone- likely due to UTI  - BCX 2 SENT ,   -Culture Results: >100,000 CFU/ml Enterococcus faecalis sensitivity pending.  12/4/23  - was started on bactrim treated 5 days until 12/6/23- abtx changed to zosyn  - iv fluids ordered- but pt refuses  - pt refusing ivf and abtx- scared she may develop fluid overload, i explained to her risks of holding ivf and abtx in the present setting- but she say she will think about it.   - chest  ct chest - noted - no new changes   - RVP negative   - d/w ID at the beside.     #pAFIB  - Metoprolol     - DVT:  Lovenox 70mg BID- switch to Eliquis   - RIJ DVT found at ECMO cannula     #Transaminitis, jaundice  - Secondary to acute illness and hypotension at [facility]  - Trend LFTs, bili-  downtrending  - Outpatient follow up     #Sleep  - Melatonin     will follow  d/w rehab team    time spent in e/m visit 55 min

## 2023-12-06 NOTE — PROGRESS NOTE ADULT - ASSESSMENT
Assessment/Plan:  AILZA FOLEY is a 62 year old female with PMH of pAFIB and sciatica; who presented to [facility] ED with SOB, and was found to have groundglass opacities and interstitial lung disease. She was treated with empiric Vancomycin and Zosyn. She underwent a bronchoscopy with bronchoalveolar lavage and pulse steroids. She was given IV diuretics for increased pulmonary congestion, but her respiratory status continued to worsen.  On 9/13, she was transferred to [facility] for ECMO requirements. She was further treated with Plasmapheresis, Rituximab and high-dose steroids, with significant improvement. Her overall hospital course was complicated by thrombocytopenia (s/p several platelet transfusions), leukocytosis (infectious workup entirely negative- s/p Vanco and Ceftriaxone), AFIB with RVR (resolved with Metoprolol), dysphagia (requiring NGT), transaminitis (secondary to acute illness and hypotension),  non-occlusive RIJ DVT (started on Lovenox). Patient now admitted for a multidisciplinary rehab program. 10-05-23 @ 13:18    * Sustained functional improvement including progress with Wt bearing LE and improvement with ability at transfers, concentrate on prox LE muscle strengthening  * Making further progress, increased ambulatory distance with walker up to 60 ft  * Urinary frequency- UA with moderate Leuks- started on Bactrim (since DCd)- await UCx   * Fever- Agreeable to start Zosyn - CTC & RVP negative - await CXR, blood cultures  * Appreciate Pulm and ID recs   * Repeat BL LE doppler     #Interstitial Lung Disease and Mixed connective tissue disease  - Gait Instability, ADL impairments and Functional impairments: start Comprehensive Rehab Program of PT/OT/SLP - 3 hours a day, 5 days a week  - P&O as needed   - Non-specific Interstitial Lung Disease  - Requiring ECMO   - Improvement s/p Plasmapheresis, Rituximab and high- dose steroids   - Albuterol/Ipratropium Nebulizer every 6 hours  - Mepron 1500mg daily  - PO Medrol ( due to liver dysfunction): Plan will be to taper by ~20% every 2 weeks    Medrol   64mg x 2 weeks   56mg x 2 weeks  44mg x 2 weeks   36mg x 2 weeks - Follow up Outpatient   - Plan to wean 02 as tolerated, Continue tapering oxygen,  -  CT Chest noted 11/10---Resp f/u review  11/14, appreciated  They reports sustained improvement, clinical (normal oxy sats on R/A, sustained progress with oxy tapering), and radiological (no acute findings on recent CT Chest, rather residual chronic infiltrative diesease noted, with recommendation to repeat CT after Acute rehab treatment     #Fever  - Recent UTI on Bactrim (since DCd)  - Await UCx  - 101.4 fever overnight (12/4-12/5)  - Lactate of 2.5- 2000mL fluid bolus, per hospitalist - Repeat lactate post bolus (patient refusing IV fluids)  - Start Zosyn IVPB 12/6 (pt agreeable)  - RVP negative  - Await blood cultures, CXR  - ID consult and Pulm re-eval (no respiratory symptoms)  - Pulm recs: continue to monitor   - ID recs: CT chest- stable (12/5)   - Repeat CBC & lactate 12/6   - Repeat BL LE doppler      #Knee buckling  - 12/1: S/p guided fall with physical therapy  - Back muscle strain with catching self on RW   - Lidocaine patch to back and BL deltoids  - Ibuprofen 400mg x1 dose     #Experiencing R mandible and occipital numbness, with associated hair loss- outpatient follow up with Rheumatology    #Posttnasal drip--ENT review 11/7, declined scope, continue flonase     #pAFIB/Rt Ij thrombus  - Metoprolol 25mg BID and lovenox  --- Eliquis 5mg BID - tolerating well     #Chronic Tinnitus   - ENT review and recs appreciate, Patient already made ENT appointment for f/u    #Lymphedema both legs -chronic and Rt IJ thrombus  - ACE Wrap BL LEs  - BL LE doppler negative for DVT  - BL UE doppler with chronic thrombotic changes in RIJ     #Transaminitis --LFT Down trending   - Secondary to acute illness and hypotension at [facility]  - Outpatient follow up     #Leucocytosis--steroid related, continue monitoring     #Neuropathy  - Gabapentin 300 mg BID, will consider increasing dose, increase to TID 11/10  --Work on motor strengthening, priority for UE as preferred by patient   - Vit b12 >2,000 in 9/2023  --Will consider Rhuematology f/u if needed    #Sleep/Mood  - Melatonin 6mg at HS  - Psych rec Xanax 0.25mg PRN    #Skin  - Skin: Left lower abdomen ruptured blister 3.0 x 1.0, stage 2 pressure injury to right buttock 4 x1 and left buttock 3x1, stage 2 pressure injury ti right inner thigh 0.2 x 0.2, right groin wound 10.5 x 2.0,   Wound care review, Left groin wound 11/15--- 0.6x1.8.0.1cm, no slough, healthy base  -- MAD to skin folds- Nystatin to submammary and abdominal pannus BID  -- MAD to L groin- cleanse with NS, Triad cream daily  -- Mad to R groin- Nystatin powder daily   -- R groin wound-- aquacel packing, Triad to periwound, Allevyn foam- daily and PRN   - Pressure injury/Skin: OOB to Chair, PT/OT   - Offload pressure, low air loss support surfaces, T&P every 2 hours   --Wound care consult-10/9 -Multiple wounds--perineal and buttock/sacral, recs appreciated   --Suggest Continue treatments as below:   Twice daily & PRN Normal Saline Cleanse of abdominal pannus & breast folds, perineal, Left & Right inner buttock erosions.  Pat thoroughly dry.   Apply Desitin generously twice daily (& PRN) to perineal, buttocks, and left groin erosions, leave open to air.    Apply Nystatin powder to abdominal pannus and breast folds.  Suggest use of Interdry cloths in folds to 'wick' moisture.  Suggest daily Normal Saline Cleanse of right groin surgical wound, pat dry.  Apply Cavillon film barrier to intact periwound skin.  Place Aquacell strip over open area, cover with foam dressing.  - Wound care following     #Pain Mgmt   - Tylenol PRN   - Gabapentin 300mg at HS     #GI/Bowel Mgmt/Hx of alternating bowel movements  - Pantoprazole  - PRN: Maalox   --Lactobacillus BID     #/Bladder Mgmt   -Spontaneously voiding   - UA (11/3) negative  - 12/4: Urinary frequency- UA with moderate Leuks- started on Bactrim (since DCd)  - Await UCx  - Started on Zosyn IVPB     #FEN   #Dysphagia  - Diet - Minced & Moist  [CC]    - Dysphagia- SLP evaluation     #Health maintenance  - Folic Acid   - MVI  - Ocean nasal spray    # Difficulty with access to peripheral veins-- Blood draws from Left arm Medline     #Precautions / PROPHYLAXIS:   - Falls  - ortho: Weight bearing status: WBAT   - Lungs: Aspiration, Incentive Spirometer   - DVT PPX: Eliquis 5 mg BID   ----------------------------------------  11/28 --Labs CBC mild anemia, normal renal function, LFT elevated, downtrending overall unremarkable   ----------------------------------------  Liaison with other providers/agencies    PEER TO PEER with Dr Tripp (patient's insurance company)  * Peer to Peer completed, insurance approval retrospectively given to cover from 11/14 to 11/20, Insurance co awaits updated notes to determine further approval of coverage   SW team to send updated clinical and functional notes to medical insurance company  ----------------------------------------  IDT conference on 12/5  Social Work: Awaiting insurance response on clinicals  SLP: --   OT: Setup for eating/grooming. Sup for UBD. Min A for LBD. Mod A x2 for shower transfer. CG with SB for toilet transfer. Mod A x1 for toileting/hygiene.  PT: Min A for sit to supine. Mod A for supine to sit. Max A for transfers. Min A x1 for stand pivot transfer with RW. Amb 65 ft with bariatric RW and WCF with min A. 6 inch step mod A x2.   RT: Limited participation.   DME: Bariatric RW, WC, drop arm commode  Barriers: New fevers  TDD: 12/13/23 to home.  ----------------------------------------  OUTPATIENT/FOLLOW UP:    Trae Whiteny  Pulmonary Disease  [address], [address] [place] [address]  Phone: [phone]  Fax: [phone]  Follow Up Time: 2 weeks    Ryanne Allen  Rheumatology  [address] [place] [address]  Phone: [phone]  Fax: [phone]  Follow Up Time: 1 week    Kyle Pierce  Internal Medicine  [address] [place] [address], [address]  [place] [address]  Phone: [phone]  Fax: [phone]  Follow Up Time: 2 weeks    Addy Powers  Pulmonary Disease  [address] [facility], [address]  [place], [place] [address]  Phone: [phone]  Fax: [phone]  Follow Up Time:     Kwame Hawley  Critical Care Medicine  [address] [facility], [address]  [place] [address]  Phone: [phone]  Fax: [phone]  Follow Up Time:     Neena Borrego  Rheumatology  [address] [place] [address], [address]  [place] [address]  Phone: [phone]  Fax: [phone]  Follow Up Time:    [facility]  [facility] [address]  Scheduled Appointment: 09/13/2023  2:40 PM     Assessment/Plan:  ALIZA FOLEY is a 62 year old female with PMH of pAFIB and sciatica; who presented to [facility] ED with SOB, and was found to have groundglass opacities and interstitial lung disease. She was treated with empiric Vancomycin and Zosyn. She underwent a bronchoscopy with bronchoalveolar lavage and pulse steroids. She was given IV diuretics for increased pulmonary congestion, but her respiratory status continued to worsen.  On 9/13, she was transferred to [facility] for ECMO requirements. She was further treated with Plasmapheresis, Rituximab and high-dose steroids, with significant improvement. Her overall hospital course was complicated by thrombocytopenia (s/p several platelet transfusions), leukocytosis (infectious workup entirely negative- s/p Vanco and Ceftriaxone), AFIB with RVR (resolved with Metoprolol), dysphagia (requiring NGT), transaminitis (secondary to acute illness and hypotension),  non-occlusive RIJ DVT (started on Lovenox). Patient now admitted for a multidisciplinary rehab program. 10-05-23 @ 13:18    * Sustained functional improvement including progress with Wt bearing LE and improvement with ability at transfers, concentrate on prox LE muscle strengthening  * Making further progress, increased ambulatory distance with walker up to 60 ft  * Urinary frequency- UA with moderate Leuks- started on Bactrim (since DCd)- await UCx   * Fever- Agreeable to start Zosyn - CTC & RVP negative - await CXR, blood cultures  * Appreciate Pulm and ID recs   * Repeat BL LE doppler     #Interstitial Lung Disease and Mixed connective tissue disease  - Gait Instability, ADL impairments and Functional impairments: start Comprehensive Rehab Program of PT/OT/SLP - 3 hours a day, 5 days a week  - P&O as needed   - Non-specific Interstitial Lung Disease  - Requiring ECMO   - Improvement s/p Plasmapheresis, Rituximab and high- dose steroids   - Albuterol/Ipratropium Nebulizer every 6 hours  - Mepron 1500mg daily  - PO Medrol ( due to liver dysfunction): Plan will be to taper by ~20% every 2 weeks    Medrol   64mg x 2 weeks   56mg x 2 weeks  44mg x 2 weeks   36mg x 2 weeks - Follow up Outpatient   - Plan to wean 02 as tolerated, Continue tapering oxygen,  -  CT Chest noted 11/10---Resp f/u review  11/14, appreciated  They reports sustained improvement, clinical (normal oxy sats on R/A, sustained progress with oxy tapering), and radiological (no acute findings on recent CT Chest, rather residual chronic infiltrative diesease noted, with recommendation to repeat CT after Acute rehab treatment     #Fever  - Recent UTI on Bactrim (since DCd)  - Await UCx  - 101.4 fever overnight (12/4-12/5)  - Lactate of 2.5- 2000mL fluid bolus, per hospitalist - Repeat lactate post bolus (patient refusing IV fluids)  - Start Zosyn IVPB 12/6 (pt agreeable)  - RVP negative  - Await blood cultures, CXR  - ID consult and Pulm re-eval (no respiratory symptoms)  - Pulm recs: continue to monitor   - ID recs: CT chest- stable (12/5)   - Repeat CBC & lactate 12/6   - Repeat BL LE doppler      #Knee buckling  - 12/1: S/p guided fall with physical therapy  - Back muscle strain with catching self on RW   - Lidocaine patch to back and BL deltoids  - Ibuprofen 400mg x1 dose     #Experiencing R mandible and occipital numbness, with associated hair loss- outpatient follow up with Rheumatology    #Posttnasal drip--ENT review 11/7, declined scope, continue flonase     #pAFIB/Rt Ij thrombus  - Metoprolol 25mg BID and lovenox  --- Eliquis 5mg BID - tolerating well     #Chronic Tinnitus   - ENT review and recs appreciate, Patient already made ENT appointment for f/u    #Lymphedema both legs -chronic and Rt IJ thrombus  - ACE Wrap BL LEs  - BL LE doppler negative for DVT  - BL UE doppler with chronic thrombotic changes in RIJ     #Transaminitis --LFT Down trending   - Secondary to acute illness and hypotension at [facility]  - Outpatient follow up     #Leucocytosis--steroid related, continue monitoring     #Neuropathy  - Gabapentin 300 mg BID, will consider increasing dose, increase to TID 11/10  --Work on motor strengthening, priority for UE as preferred by patient   - Vit b12 >2,000 in 9/2023  --Will consider Rhuematology f/u if needed    #Sleep/Mood  - Melatonin 6mg at HS  - Psych rec Xanax 0.25mg PRN    #Skin  - Skin: Left lower abdomen ruptured blister 3.0 x 1.0, stage 2 pressure injury to right buttock 4 x1 and left buttock 3x1, stage 2 pressure injury ti right inner thigh 0.2 x 0.2, right groin wound 10.5 x 2.0,   Wound care review, Left groin wound 11/15--- 0.6x1.8.0.1cm, no slough, healthy base  -- MAD to skin folds- Nystatin to submammary and abdominal pannus BID  -- MAD to L groin- cleanse with NS, Triad cream daily  -- Mad to R groin- Nystatin powder daily   -- R groin wound-- aquacel packing, Triad to periwound, Allevyn foam- daily and PRN   - Pressure injury/Skin: OOB to Chair, PT/OT   - Offload pressure, low air loss support surfaces, T&P every 2 hours   --Wound care consult-10/9 -Multiple wounds--perineal and buttock/sacral, recs appreciated   --Suggest Continue treatments as below:   Twice daily & PRN Normal Saline Cleanse of abdominal pannus & breast folds, perineal, Left & Right inner buttock erosions.  Pat thoroughly dry.   Apply Desitin generously twice daily (& PRN) to perineal, buttocks, and left groin erosions, leave open to air.    Apply Nystatin powder to abdominal pannus and breast folds.  Suggest use of Interdry cloths in folds to 'wick' moisture.  Suggest daily Normal Saline Cleanse of right groin surgical wound, pat dry.  Apply Cavillon film barrier to intact periwound skin.  Place Aquacell strip over open area, cover with foam dressing.  - Wound care following     #Pain Mgmt   - Tylenol PRN   - Gabapentin 300mg at HS     #GI/Bowel Mgmt/Hx of alternating bowel movements  - Pantoprazole  - PRN: Maalox   --Lactobacillus BID     #/Bladder Mgmt   -Spontaneously voiding   - UA (11/3) negative  - 12/4: Urinary frequency- UA with moderate Leuks- started on Bactrim (since DCd)  - Await UCx  - Started on Zosyn IVPB     #FEN   #Dysphagia  - Diet - Minced & Moist  [CC]    - Dysphagia- SLP evaluation     #Health maintenance  - Folic Acid   - MVI  - Ocean nasal spray    # Difficulty with access to peripheral veins-- Blood draws from Left arm Medline     #Precautions / PROPHYLAXIS:   - Falls  - ortho: Weight bearing status: WBAT   - Lungs: Aspiration, Incentive Spirometer   - DVT PPX: Eliquis 5 mg BID   ----------------------------------------  11/28 --Labs CBC mild anemia, normal renal function, LFT elevated, downtrending overall unremarkable   ----------------------------------------  Liaison with other providers/agencies    PEER TO PEER with Dr Tripp (patient's insurance company)  * Peer to Peer completed, insurance approval retrospectively given to cover from 11/14 to 11/20, Insurance co awaits updated notes to determine further approval of coverage   SW team to send updated clinical and functional notes to medical insurance company  ----------------------------------------  IDT conference on 12/5  Social Work: Awaiting insurance response on clinicals  SLP: --   OT: Setup for eating/grooming. Sup for UBD. Min A for LBD. Mod A x2 for shower transfer. CG with SB for toilet transfer. Mod A x1 for toileting/hygiene.  PT: Min A for sit to supine. Mod A for supine to sit. Max A for transfers. Min A x1 for stand pivot transfer with RW. Amb 65 ft with bariatric RW and WCF with min A. 6 inch step mod A x2.   RT: Limited participation.   DME: Bariatric RW, WC, drop arm commode  Barriers: New fevers  TDD: 12/13/23 to home.  ----------------------------------------  OUTPATIENT/FOLLOW UP:    Trae Whitney  Pulmonary Disease  [address], [address] [place] [address]  Phone: [phone]  Fax: [phone]  Follow Up Time: 2 weeks    Ryanne Allen  Rheumatology  [address] [place] [address]  Phone: [phone]  Fax: [phone]  Follow Up Time: 1 week    Kyle Pierce  Internal Medicine  [address] [place] [address], [address]  [place] [address]  Phone: [phone]  Fax: [phone]  Follow Up Time: 2 weeks    Addy Powers  Pulmonary Disease  [address] [facility], [address]  [place], [place] [address]  Phone: [phone]  Fax: [phone]  Follow Up Time:     Kwame Hawley  Critical Care Medicine  [address] [facility], [address]  [place] [address]  Phone: [phone]  Fax: [phone]  Follow Up Time:     Neena Borrego  Rheumatology  [address] [place] [address], [address]  [place] [address]  Phone: [phone]  Fax: [phone]  Follow Up Time:    [facility]  [facility] [address]  Scheduled Appointment: 09/13/2023  2:40 PM

## 2023-12-06 NOTE — PROGRESS NOTE ADULT - SUBJECTIVE AND OBJECTIVE BOX
SUBJECTIVE/ROS:  Patient seen and examined at bedside this AM. Partner present.  Admits to sleeping well overnight.  Temp of 100.4 noted overnight.  Agreeable to IV ABX, still wishes to hold off on IV.    ROS--No chest or abd pain, no palpitations nausea or vomiting  Tingling/numbness as noted  LBM 12/5, per patient.    Therapy.  Min A for sit to supine transfers (improved from Mod A x2 persons perviously)  Ambulating 65ft with bariatric RW (improved from 15 ft previously)  Supervision for upper body dressing  Sustained improvement with slide board transfer bed to   Sustained improvement of upper extremity function and strength      Vital Signs Last 24 Hrs  T(C): 37.2 (06 Dec 2023 08:27), Max: 38 (05 Dec 2023 11:50)  T(F): 99 (06 Dec 2023 08:27), Max: 100.4 (05 Dec 2023 11:50)  HR: 104 (06 Dec 2023 08:27) (89 - 104)  BP: 88/55 (06 Dec 2023 08:27) (88/55 - 109/68)  BP(mean): --  RR: 16 (06 Dec 2023 08:27) (16 - 16)  SpO2: 92% (06 Dec 2023 08:27) (92% - 93%)      PHYSICAL EXAM:  Gen -  Comfortable,  at bedside -normal oxy sat and subsequently self ambulating   Pulm - clear  Cardiovascular - HS i and II intermittently irregular  Abdomen - Soft, non tender,   Extremities - edema to b/l LE, no calf tenderness, LUE Med line    Neuro-  Cognitive - awake, alert, fully oriented,  Light tough sensation diffusely reduced on fingers and dorsal foot, and over right lower jaw, localized area approx 2 cm, no skin changes noted, non progressive   Motor -                    LEFT    UE - 4/5                    RIGHT UE - 4/5                     LEFT    LE - 3+/5, distally and 3/5 proximal                    RIGHT LE - Ankles PF 3/5 ,DF 2/5, Knee ext 3/5 Hips limited by pain Rt hip due to ulcer at ECHO access site   Sensory - decreased light tough sensation fingers and sole of foot, difusely  MSK: improvement with motor strength  Psychiatric - Mood stable, Affect WNL  Skin -- , stage 2 pressure injury to right buttock 4 x1 and left buttock 3x1, stage 2 pressure injury ti right inner thigh 0.2 x 0.2, right groin wound 10.5 x 2.0, Left groin wound 0.6x1.8.0.1cm  Mild discomfort on pressure over Rt inguinal ligament, but no swelling or erythema  MMT--Able to contract B/L upper back muscles, EF/EE 4/5 distally, knee Est 3+/5      LABS:                        9.8    8.23  )-----------( 230      ( 05 Dec 2023 11:00 )             31.0     12-05    133<L>  |  98  |  21  ----------------------------<  151<H>  3.5   |  25  |  0.81    Ca    9.0      05 Dec 2023 11:00    TPro  5.1<L>  /  Alb  2.7<L>  /  TBili  0.9  /  DBili  x   /  AST  20  /  ALT  47<H>  /  AlkPhos  127<H>  12-05        MEDICATIONS  (STANDING):  apixaban 5 milliGRAM(s) Oral every 12 hours  artificial  tears Solution 1 Drop(s) Both EYES every 12 hours  ascorbic acid 500 milliGRAM(s) Oral daily  atovaquone  Suspension 1500 milliGRAM(s) Oral daily  dextrose 5%. 1000 milliLiter(s) (100 mL/Hr) IV Continuous <Continuous>  dextrose 5%. 1000 milliLiter(s) (50 mL/Hr) IV Continuous <Continuous>  dextrose 50% Injectable 12.5 Gram(s) IV Push once  dextrose 50% Injectable 25 Gram(s) IV Push once  dextrose 50% Injectable 25 Gram(s) IV Push once  folic acid 1 milliGRAM(s) Oral daily  gabapentin 300 milliGRAM(s) Oral three times a day  glucagon  Injectable 1 milliGRAM(s) IntraMuscular once  lactobacillus acidophilus 1 Tablet(s) Oral two times a day with meals  lidocaine   4% Patch 2 Patch Transdermal <User Schedule>  lidocaine   4% Patch 1 Patch Transdermal <User Schedule>  melatonin 6 milliGRAM(s) Oral at bedtime  methylPREDNISolone 36 milliGRAM(s) Oral daily  metoprolol tartrate 12.5 milliGRAM(s) Oral two times a day  multivitamin 1 Tablet(s) Oral daily  nystatin Powder 1 Application(s) Topical two times a day  pantoprazole    Tablet 40 milliGRAM(s) Oral before breakfast  piperacillin/tazobactam IVPB. 3.375 Gram(s) IV Intermittent once  piperacillin/tazobactam IVPB.. 3.375 Gram(s) IV Intermittent every 8 hours  polyethylene glycol 3350 17 Gram(s) Oral <User Schedule>  sodium chloride 0.9% lock flush 5 milliLiter(s) IV Push two times a day  sodium chloride 0.9%. 1000 milliLiter(s) (100 mL/Hr) IV Continuous <Continuous>  zinc oxide 40% Paste 1 Application(s) Topical three times a day    MEDICATIONS  (PRN):  acetaminophen     Tablet .. 650 milliGRAM(s) Oral every 6 hours PRN Temp greater or equal to 38C (100.4F), Mild Pain (1 - 3), Moderate Pain (4 - 6)  albuterol/ipratropium for Nebulization 3 milliLiter(s) Nebulizer every 6 hours PRN Shortness of Breath and/or Wheezing  ALPRAZolam 0.25 milliGRAM(s) Oral every 6 hours PRN Anxiety  aluminum hydroxide/magnesium hydroxide/simethicone Suspension 30 milliLiter(s) Oral every 4 hours PRN Dyspepsia  ammonium lactate 12% Lotion 1 Application(s) Topical every 12 hours PRN BL feet dryness  baclofen 5 milliGRAM(s) Oral every 8 hours PRN Musculoskeletal Pain  benzocaine/menthol Lozenge 1 Lozenge Oral two times a day PRN Sore Throat  benzonatate 100 milliGRAM(s) Oral three times a day PRN Cough  bisacodyl 5 milliGRAM(s) Oral daily PRN Constipation  dextrose Oral Gel 15 Gram(s) Oral once PRN Blood Glucose LESS THAN 70 milliGRAM(s)/deciliter  hydrocortisone hemorrhoidal Suppository 1 Suppository(s) Rectal two times a day PRN Hemorrhoids  loperamide 2 milliGRAM(s) Oral three times a day PRN Diarrhea  SIMETHICONE SOFTGELS 250 milliGRAM(s),SIMETHICONE SOFTGELS 250 milliGRAM(s) 250 milliGRAM(s) Oral three times a day PRN for gas  sodium chloride 0.65% Nasal 1 Spray(s) Both Nostrils every 8 hours PRN Nasal Congestion    SUBJECTIVE/ROS:  Patient seen and examined at bedside this AM. Partner present.  Admits to sleeping well overnight.  Temp of 100.4 noted overnight.  Agreeable to IV ABX, still wishes to hold off on IV.  Tingling/numbness as noted  LBM 12/5, per patient.    ROS--No chest or abd pain, no palpitations nausea or vomiting    Therapy.  Min A for sit to supine transfers (improved from Mod A x2 persons perviously)  Ambulating 65ft with bariatric RW (improved from 15 ft previously)  Supervision for upper body dressing  Sustained improvement with slide board transfer bed to   Sustained improvement of upper extremity function and strength    Vital Signs Last 24 Hrs  T(C): 37.2 (06 Dec 2023 08:27), Max: 38 (05 Dec 2023 11:50)  T(F): 99 (06 Dec 2023 08:27), Max: 100.4 (05 Dec 2023 11:50)  HR: 104 (06 Dec 2023 08:27) (89 - 104)  BP: 88/55 (06 Dec 2023 08:27) (88/55 - 109/68)  RR: 16 (06 Dec 2023 08:27) (16 - 16)  SpO2: 92% (06 Dec 2023 08:27) (92% - 93%)      PHYSICAL EXAM:  Gen -  Comfortable,  at bedside -normal oxy sat and subsequently self ambulating   Pulm - clear  Cardiovascular - HS i and II intermittently irregular  Abdomen - Soft, non tender,   Extremities - edema to b/l LE, no calf tenderness, LUE Med line    Neuro-  Cognitive - awake, alert, fully oriented,  Light tough sensation diffusely reduced on fingers and dorsal foot, and over right lower jaw, localized area approx 2 cm, no skin changes noted, non progressive   Motor -                    LEFT    UE - 4/5                    RIGHT UE - 4/5                     LEFT    LE - 3+/5, distally and 3/5 proximal                    RIGHT LE - Ankles PF 3/5 ,DF 2/5, Knee ext 3/5 Hips limited by pain Rt hip due to ulcer at ECHO access site   Sensory - decreased light tough sensation fingers and sole of foot, difusely  MSK: improvement with motor strength  Psychiatric - Mood stable, Affect WNL  Skin -- , stage 2 pressure injury to right buttock 4 x1 and left buttock 3x1, stage 2 pressure injury ti right inner thigh 0.2 x 0.2, right groin wound 10.5 x 2.0, Left groin wound 0.6x1.8.0.1cm  Mild discomfort on pressure over Rt inguinal ligament, but no swelling or erythema  MMT--Able to contract B/L upper back muscles, EF/EE 4/5 distally, knee Est 3+/5      LABS:                        9.8    8.23  )-----------( 230      ( 05 Dec 2023 11:00 )             31.0     12-05    133<L>  |  98  |  21  ----------------------------<  151<H>  3.5   |  25  |  0.81    Ca    9.0      05 Dec 2023 11:00    TPro  5.1<L>  /  Alb  2.7<L>  /  TBili  0.9  /  DBili  x   /  AST  20  /  ALT  47<H>  /  AlkPhos  127<H>  12-05      MEDICATIONS  (STANDING):  apixaban 5 milliGRAM(s) Oral every 12 hours  artificial  tears Solution 1 Drop(s) Both EYES every 12 hours  ascorbic acid 500 milliGRAM(s) Oral daily  atovaquone  Suspension 1500 milliGRAM(s) Oral daily  dextrose 5%. 1000 milliLiter(s) (100 mL/Hr) IV Continuous <Continuous>  dextrose 5%. 1000 milliLiter(s) (50 mL/Hr) IV Continuous <Continuous>  dextrose 50% Injectable 12.5 Gram(s) IV Push once  dextrose 50% Injectable 25 Gram(s) IV Push once  dextrose 50% Injectable 25 Gram(s) IV Push once  folic acid 1 milliGRAM(s) Oral daily  gabapentin 300 milliGRAM(s) Oral three times a day  glucagon  Injectable 1 milliGRAM(s) IntraMuscular once  lactobacillus acidophilus 1 Tablet(s) Oral two times a day with meals  lidocaine   4% Patch 2 Patch Transdermal <User Schedule>  lidocaine   4% Patch 1 Patch Transdermal <User Schedule>  melatonin 6 milliGRAM(s) Oral at bedtime  methylPREDNISolone 36 milliGRAM(s) Oral daily  metoprolol tartrate 12.5 milliGRAM(s) Oral two times a day  multivitamin 1 Tablet(s) Oral daily  nystatin Powder 1 Application(s) Topical two times a day  pantoprazole    Tablet 40 milliGRAM(s) Oral before breakfast  piperacillin/tazobactam IVPB. 3.375 Gram(s) IV Intermittent once  piperacillin/tazobactam IVPB.. 3.375 Gram(s) IV Intermittent every 8 hours  polyethylene glycol 3350 17 Gram(s) Oral <User Schedule>  sodium chloride 0.9% lock flush 5 milliLiter(s) IV Push two times a day  sodium chloride 0.9%. 1000 milliLiter(s) (100 mL/Hr) IV Continuous <Continuous>  zinc oxide 40% Paste 1 Application(s) Topical three times a day    MEDICATIONS  (PRN):  acetaminophen     Tablet .. 650 milliGRAM(s) Oral every 6 hours PRN Temp greater or equal to 38C (100.4F), Mild Pain (1 - 3), Moderate Pain (4 - 6)  albuterol/ipratropium for Nebulization 3 milliLiter(s) Nebulizer every 6 hours PRN Shortness of Breath and/or Wheezing  ALPRAZolam 0.25 milliGRAM(s) Oral every 6 hours PRN Anxiety  aluminum hydroxide/magnesium hydroxide/simethicone Suspension 30 milliLiter(s) Oral every 4 hours PRN Dyspepsia  ammonium lactate 12% Lotion 1 Application(s) Topical every 12 hours PRN BL feet dryness  baclofen 5 milliGRAM(s) Oral every 8 hours PRN Musculoskeletal Pain  benzocaine/menthol Lozenge 1 Lozenge Oral two times a day PRN Sore Throat  benzonatate 100 milliGRAM(s) Oral three times a day PRN Cough  bisacodyl 5 milliGRAM(s) Oral daily PRN Constipation  dextrose Oral Gel 15 Gram(s) Oral once PRN Blood Glucose LESS THAN 70 milliGRAM(s)/deciliter  hydrocortisone hemorrhoidal Suppository 1 Suppository(s) Rectal two times a day PRN Hemorrhoids  loperamide 2 milliGRAM(s) Oral three times a day PRN Diarrhea  SIMETHICONE SOFTGELS 250 milliGRAM(s),SIMETHICONE SOFTGELS 250 milliGRAM(s) 250 milliGRAM(s) Oral three times a day PRN for gas  sodium chloride 0.65% Nasal 1 Spray(s) Both Nostrils every 8 hours PRN Nasal Congestion

## 2023-12-06 NOTE — PROGRESS NOTE ADULT - SUBJECTIVE AND OBJECTIVE BOX
62 year old female with PMH of pAFIB and sciatica; who presented to [facility] ED with SOB. She has been experiencing chronic SOB and non-productive cough for several months and was worked up in [place] where she had a CT scan which resulted negative.  She has been evaluated by Pulmonology and Rheumatology and was ruled out for lupus and immunological disorders.  She recently went to Urgent Care due to SOB with ambulating short distances (12-15 feet), and an XR was concerning for BL LL infiltrates. While at [facility],  CXR and CTA were significant for ground glass opacities, and interstitial lung disease, respectively. She was treated with empiric Vancomycin and Zosyn. She underwent a bronchoscopy with bronchoalveolar lavage and pulse steroids. She was given IV diuretics for increased pulmonary congestion, but her respiratory status continued to worsen.  On 9/13, she was transferred to [facility] for ECMO requirements. She was further treated with Plasmapheresis, Rituximab and high-dose steroids, with significant improvement.   Her overall hospital course was complicated by thrombocytopenia (s/p several platelet transfusions), leukocytosis (infectious workup entirely negative- s/p Vanco and Ceftriaxone), AFIB with RVR (resolved with Metoprolol), dysphagia (requiring NGT), transaminitis (secondary to acute illness and hypotension),  non-occlusive RIJ DVT (started on Lovenox). PM&R was consulted and deemed her an appropriate candidate for IRF. She was medically stabilized and cleared for discharge to [facility].     seen at the bedside, events noted- temp 100.4 overnight, bp low this am,  refuses rectal temp and iv lfuids  c/o feeling tired. no n/v, no sob.    Vital Signs Last 24 Hrs  T(C): 37.2 (06 Dec 2023 08:27), Max: 37.2 (06 Dec 2023 08:27)  T(F): 99 (06 Dec 2023 08:27), Max: 99 (06 Dec 2023 08:27)  HR: 104 (06 Dec 2023 08:27) (89 - 104)  BP: 88/55 (06 Dec 2023 08:27) (88/55 - 109/68)  BP(mean): --  RR: 16 (06 Dec 2023 08:27) (16 - 16)  SpO2: 92% (06 Dec 2023 08:27) (92% - 93%)    Parameters below as of 06 Dec 2023 08:27  Patient On (Oxygen Delivery Method): room air      GENERAL- NAD  EAR/NOSE/MOUTH/THROAT -  MMM  EYES- MADAI, conjunctiva and Sclera clear- icterus+ b/l  NECK- supple  RESPIRATORY-  clear to auscultation bilaterally, non laboured breathing  CARDIOVASCULAR - SIS2, RRR  GI - soft NT BS present  EXTREMITIES-  b/l LE edema- improving   NEUROLOGY-  b/l UE AND LE weakness  PSYCHIATRY- AAO X 3                9.8                  133  | 25   | 21           8.23  >-----------< 230     ------------------------< 151                   31.0                 3.5  | 98   | 0.81                                         Ca 9.0   Mg x     Ph x        Lactate, Blood: 2.5: Elevated lactate. Consider ordering follow-up lactate to trend. mmol/L (12.05.23 @ 11:00)      < from: TTE W or WO Ultrasound Enhancing Agent (10.04.23 @ 10:58) >   1. Left ventricular cavity is normally sized. Left ventricular wall thickness is normal. Left ventricular systolic function is normal with an ejection fraction of 64 % by Barnes's method of disks.  < from: CT Chest No Cont (12.05.23 @ 14:56) >  PRESSION:  There are stable peripherally distributed reticular   opacities identified within the bilateral lung parenchyma with comparison   to examination November 10, 2023, without evidence for interval   development of infiltrate or lobar consolidation. No significant traction   bronchiectasis or honeycombing identified. Band type opacity within the   right lower lobe likely related to platelike atelectasis/scarring.      Culture Results:   >100,000 CFU/ml Enterococcus faecalis  <10,000 CFU/ml Normal Urogenital selvin present (12.04.23 @ 13:45)      < end of copied text >      MEDICATIONS  (STANDING):  apixaban 5 milliGRAM(s) Oral every 12 hours  artificial  tears Solution 1 Drop(s) Both EYES every 12 hours  ascorbic acid 500 milliGRAM(s) Oral daily  atovaquone  Suspension 1500 milliGRAM(s) Oral daily  dextrose 5%. 1000 milliLiter(s) (100 mL/Hr) IV Continuous <Continuous>  dextrose 5%. 1000 milliLiter(s) (50 mL/Hr) IV Continuous <Continuous>  dextrose 50% Injectable 12.5 Gram(s) IV Push once  dextrose 50% Injectable 25 Gram(s) IV Push once  dextrose 50% Injectable 25 Gram(s) IV Push once  folic acid 1 milliGRAM(s) Oral daily  gabapentin 300 milliGRAM(s) Oral three times a day  glucagon  Injectable 1 milliGRAM(s) IntraMuscular once  lactobacillus acidophilus 1 Tablet(s) Oral two times a day with meals  lidocaine   4% Patch 2 Patch Transdermal <User Schedule>  lidocaine   4% Patch 1 Patch Transdermal <User Schedule>  melatonin 6 milliGRAM(s) Oral at bedtime  methylPREDNISolone 36 milliGRAM(s) Oral daily  metoprolol tartrate 12.5 milliGRAM(s) Oral two times a day  multivitamin 1 Tablet(s) Oral daily  nystatin Powder 1 Application(s) Topical two times a day  pantoprazole    Tablet 40 milliGRAM(s) Oral before breakfast  piperacillin/tazobactam IVPB. 3.375 Gram(s) IV Intermittent once  piperacillin/tazobactam IVPB.. 3.375 Gram(s) IV Intermittent every 8 hours  polyethylene glycol 3350 17 Gram(s) Oral <User Schedule>  sodium chloride 0.9% lock flush 5 milliLiter(s) IV Push two times a day  sodium chloride 0.9%. 1000 milliLiter(s) (100 mL/Hr) IV Continuous <Continuous>  zinc oxide 40% Paste 1 Application(s) Topical three times a day    MEDICATIONS  (PRN):  acetaminophen     Tablet .. 650 milliGRAM(s) Oral every 6 hours PRN Temp greater or equal to 38C (100.4F), Mild Pain (1 - 3), Moderate Pain (4 - 6)  albuterol/ipratropium for Nebulization 3 milliLiter(s) Nebulizer every 6 hours PRN Shortness of Breath and/or Wheezing  ALPRAZolam 0.25 milliGRAM(s) Oral every 6 hours PRN Anxiety  aluminum hydroxide/magnesium hydroxide/simethicone Suspension 30 milliLiter(s) Oral every 4 hours PRN Dyspepsia  ammonium lactate 12% Lotion 1 Application(s) Topical every 12 hours PRN BL feet dryness  baclofen 5 milliGRAM(s) Oral every 8 hours PRN Musculoskeletal Pain  benzocaine/menthol Lozenge 1 Lozenge Oral two times a day PRN Sore Throat  benzonatate 100 milliGRAM(s) Oral three times a day PRN Cough  bisacodyl 5 milliGRAM(s) Oral daily PRN Constipation  dextrose Oral Gel 15 Gram(s) Oral once PRN Blood Glucose LESS THAN 70 milliGRAM(s)/deciliter  hydrocortisone hemorrhoidal Suppository 1 Suppository(s) Rectal two times a day PRN Hemorrhoids  loperamide 2 milliGRAM(s) Oral three times a day PRN Diarrhea  SIMETHICONE SOFTGELS 250 milliGRAM(s),SIMETHICONE SOFTGELS 250 milliGRAM(s) 250 milliGRAM(s) Oral three times a day PRN for gas  sodium chloride 0.65% Nasal 1 Spray(s) Both Nostrils every 8 hours PRN Nasal Congestion         62 year old female with PMH of pAFIB and sciatica; who presented to [facility] ED with SOB. She has been experiencing chronic SOB and non-productive cough for several months and was worked up in [place] where she had a CT scan which resulted negative.  She has been evaluated by Pulmonology and Rheumatology and was ruled out for lupus and immunological disorders.  She recently went to Urgent Care due to SOB with ambulating short distances (12-15 feet), and an XR was concerning for BL LL infiltrates. While at [facility],  CXR and CTA were significant for ground glass opacities, and interstitial lung disease, respectively. She was treated with empiric Vancomycin and Zosyn. She underwent a bronchoscopy with bronchoalveolar lavage and pulse steroids. She was given IV diuretics for increased pulmonary congestion, but her respiratory status continued to worsen.  On 9/13, she was transferred to [facility] for ECMO requirements. She was further treated with Plasmapheresis, Rituximab and high-dose steroids, with significant improvement.   Her overall hospital course was complicated by thrombocytopenia (s/p several platelet transfusions), leukocytosis (infectious workup entirely negative- s/p Vanco and Ceftriaxone), AFIB with RVR (resolved with Metoprolol), dysphagia (requiring NGT), transaminitis (secondary to acute illness and hypotension),  non-occlusive RIJ DVT (started on Lovenox). PM&R was consulted and deemed her an appropriate candidate for IRF. She was medically stabilized and cleared for discharge to [facility].     seen at the bedside, events noted- temp 100.4 overnight, bp low this am,  refuses rectal temp and iv lfuids  c/o feeling tired. no n/v, no sob.    Vital Signs Last 24 Hrs  T(C): 37.2 (06 Dec 2023 08:27), Max: 37.2 (06 Dec 2023 08:27)  T(F): 99 (06 Dec 2023 08:27), Max: 99 (06 Dec 2023 08:27)  HR: 104 (06 Dec 2023 08:27) (89 - 104)  BP: 88/55 (06 Dec 2023 08:27) (88/55 - 109/68)  BP(mean): --  RR: 16 (06 Dec 2023 08:27) (16 - 16)  SpO2: 92% (06 Dec 2023 08:27) (92% - 93%)    Parameters below as of 06 Dec 2023 08:27  Patient On (Oxygen Delivery Method): room air      GENERAL- NAD  EAR/NOSE/MOUTH/THROAT -  MMM  EYES- MADAI, conjunctiva and Sclera clear- icterus+ b/l  NECK- supple  RESPIRATORY-  clear to auscultation bilaterally, non laboured breathing  CARDIOVASCULAR - SIS2, RRR  GI - soft NT BS present  EXTREMITIES-  b/l LE edema- improving   NEUROLOGY-  b/l UE AND LE weakness  PSYCHIATRY- AAO X 3                9.8                  133  | 25   | 21           8.23  >-----------< 230     ------------------------< 151                   31.0                 3.5  | 98   | 0.81                                         Ca 9.0   Mg x     Ph x        Lactate, Blood: 2.5: Elevated lactate. Consider ordering follow-up lactate to trend. mmol/L (12.05.23 @ 11:00)      < from: TTE W or WO Ultrasound Enhancing Agent (10.04.23 @ 10:58) >   1. Left ventricular cavity is normally sized. Left ventricular wall thickness is normal. Left ventricular systolic function is normal with an ejection fraction of 64 % by Barnes's method of disks.  < from: CT Chest No Cont (12.05.23 @ 14:56) >  PRESSION:  There are stable peripherally distributed reticular   opacities identified within the bilateral lung parenchyma with comparison   to examination November 10, 2023, without evidence for interval   development of infiltrate or lobar consolidation. No significant traction   bronchiectasis or honeycombing identified. Band type opacity within the   right lower lobe likely related to platelike atelectasis/scarring.      Culture Results:   >100,000 CFU/ml Enterococcus faecalis  <10,000 CFU/ml Normal Urogenital selvin present (12.04.23 @ 13:45)      < end of copied text >      MEDICATIONS  (STANDING):  apixaban 5 milliGRAM(s) Oral every 12 hours  artificial  tears Solution 1 Drop(s) Both EYES every 12 hours  ascorbic acid 500 milliGRAM(s) Oral daily  atovaquone  Suspension 1500 milliGRAM(s) Oral daily  dextrose 5%. 1000 milliLiter(s) (100 mL/Hr) IV Continuous <Continuous>  dextrose 5%. 1000 milliLiter(s) (50 mL/Hr) IV Continuous <Continuous>  dextrose 50% Injectable 12.5 Gram(s) IV Push once  dextrose 50% Injectable 25 Gram(s) IV Push once  dextrose 50% Injectable 25 Gram(s) IV Push once  folic acid 1 milliGRAM(s) Oral daily  gabapentin 300 milliGRAM(s) Oral three times a day  glucagon  Injectable 1 milliGRAM(s) IntraMuscular once  lactobacillus acidophilus 1 Tablet(s) Oral two times a day with meals  lidocaine   4% Patch 2 Patch Transdermal <User Schedule>  lidocaine   4% Patch 1 Patch Transdermal <User Schedule>  melatonin 6 milliGRAM(s) Oral at bedtime  methylPREDNISolone 36 milliGRAM(s) Oral daily  metoprolol tartrate 12.5 milliGRAM(s) Oral two times a day  multivitamin 1 Tablet(s) Oral daily  nystatin Powder 1 Application(s) Topical two times a day  pantoprazole    Tablet 40 milliGRAM(s) Oral before breakfast  piperacillin/tazobactam IVPB. 3.375 Gram(s) IV Intermittent once  piperacillin/tazobactam IVPB.. 3.375 Gram(s) IV Intermittent every 8 hours  polyethylene glycol 3350 17 Gram(s) Oral <User Schedule>  sodium chloride 0.9% lock flush 5 milliLiter(s) IV Push two times a day  sodium chloride 0.9%. 1000 milliLiter(s) (100 mL/Hr) IV Continuous <Continuous>  zinc oxide 40% Paste 1 Application(s) Topical three times a day    MEDICATIONS  (PRN):  acetaminophen     Tablet .. 650 milliGRAM(s) Oral every 6 hours PRN Temp greater or equal to 38C (100.4F), Mild Pain (1 - 3), Moderate Pain (4 - 6)  albuterol/ipratropium for Nebulization 3 milliLiter(s) Nebulizer every 6 hours PRN Shortness of Breath and/or Wheezing  ALPRAZolam 0.25 milliGRAM(s) Oral every 6 hours PRN Anxiety  aluminum hydroxide/magnesium hydroxide/simethicone Suspension 30 milliLiter(s) Oral every 4 hours PRN Dyspepsia  ammonium lactate 12% Lotion 1 Application(s) Topical every 12 hours PRN BL feet dryness  baclofen 5 milliGRAM(s) Oral every 8 hours PRN Musculoskeletal Pain  benzocaine/menthol Lozenge 1 Lozenge Oral two times a day PRN Sore Throat  benzonatate 100 milliGRAM(s) Oral three times a day PRN Cough  bisacodyl 5 milliGRAM(s) Oral daily PRN Constipation  dextrose Oral Gel 15 Gram(s) Oral once PRN Blood Glucose LESS THAN 70 milliGRAM(s)/deciliter  hydrocortisone hemorrhoidal Suppository 1 Suppository(s) Rectal two times a day PRN Hemorrhoids  loperamide 2 milliGRAM(s) Oral three times a day PRN Diarrhea  SIMETHICONE SOFTGELS 250 milliGRAM(s),SIMETHICONE SOFTGELS 250 milliGRAM(s) 250 milliGRAM(s) Oral three times a day PRN for gas  sodium chloride 0.65% Nasal 1 Spray(s) Both Nostrils every 8 hours PRN Nasal Congestion

## 2023-12-06 NOTE — PROGRESS NOTE ADULT - SUBJECTIVE AND OBJECTIVE BOX
CC: f/u for fever    Patient reports: she is feeling okay, low grade fever moderating, no respiratory, GI or  symptoms.  She offers no complaints, started on zosyn earlier.    REVIEW OF SYSTEMS:  All other review of systems negative (Comprehensive ROS)    Antimicrobials Day #  :day 1  atovaquone  Suspension 1500 milliGRAM(s) Oral daily  piperacillin/tazobactam IVPB.. 3.375 Gram(s) IV Intermittent every 8 hours    Other Medications Reviewed  MEDICATIONS  (STANDING):  apixaban 5 milliGRAM(s) Oral every 12 hours  artificial  tears Solution 1 Drop(s) Both EYES every 12 hours  ascorbic acid 500 milliGRAM(s) Oral daily  atovaquone  Suspension 1500 milliGRAM(s) Oral daily  dextrose 5%. 1000 milliLiter(s) (50 mL/Hr) IV Continuous <Continuous>  dextrose 5%. 1000 milliLiter(s) (100 mL/Hr) IV Continuous <Continuous>  dextrose 50% Injectable 25 Gram(s) IV Push once  dextrose 50% Injectable 25 Gram(s) IV Push once  dextrose 50% Injectable 12.5 Gram(s) IV Push once  folic acid 1 milliGRAM(s) Oral daily  gabapentin 300 milliGRAM(s) Oral three times a day  glucagon  Injectable 1 milliGRAM(s) IntraMuscular once  lactobacillus acidophilus 1 Tablet(s) Oral two times a day with meals  lidocaine   4% Patch 2 Patch Transdermal <User Schedule>  lidocaine   4% Patch 1 Patch Transdermal <User Schedule>  melatonin 6 milliGRAM(s) Oral at bedtime  methylPREDNISolone 36 milliGRAM(s) Oral daily  metoprolol tartrate 12.5 milliGRAM(s) Oral two times a day  multivitamin 1 Tablet(s) Oral daily  nystatin Powder 1 Application(s) Topical two times a day  pantoprazole    Tablet 40 milliGRAM(s) Oral before breakfast  piperacillin/tazobactam IVPB.. 3.375 Gram(s) IV Intermittent every 8 hours  polyethylene glycol 3350 17 Gram(s) Oral <User Schedule>  sodium chloride 0.9% lock flush 5 milliLiter(s) IV Push two times a day  sodium chloride 0.9%. 1000 milliLiter(s) (100 mL/Hr) IV Continuous <Continuous>  zinc oxide 40% Paste 1 Application(s) Topical three times a day    T(F): 99 (12-06-23 @ 08:27), Max: 99 (12-06-23 @ 08:27)  HR: 104 (12-06-23 @ 08:27)  BP: 88/55 (12-06-23 @ 08:27)  RR: 16 (12-06-23 @ 08:27)  SpO2: 92% (12-06-23 @ 08:27)  Wt(kg): --    PHYSICAL EXAM:  General: alert, no acute distress  Eyes:  anicteric, no conjunctival injection, no discharge  Oropharynx: no lesions or injection 	  Neck: supple, without adenopathy  Lungs: clear to auscultation  Heart: regular rate and rhythm; no murmur, rubs or gallops  Abdomen: soft, nondistended, nontender, without mass or organomegaly  Skin: no lesions  Extremities: no clubbing, cyanosis, or edema  Neurologic: alert, oriented, moves all extremities    LAB RESULTS:                        9.8    8.23  )-----------( 230      ( 05 Dec 2023 11:00 )             31.0     12-05    133<L>  |  98  |  21  ----------------------------<  151<H>  3.5   |  25  |  0.81    Ca    9.0      05 Dec 2023 11:00    TPro  5.1<L>  /  Alb  2.7<L>  /  TBili  0.9  /  DBili  x   /  AST  20  /  ALT  47<H>  /  AlkPhos  127<H>  12-05    LIVER FUNCTIONS - ( 05 Dec 2023 11:00 )  Alb: 2.7 g/dL / Pro: 5.1 g/dL / ALK PHOS: 127 U/L / ALT: 47 U/L / AST: 20 U/L / GGT: x                 MICROBIOLOGY:  RECENT CULTURES:  12-05 @ 11:00 .Blood Blood-Peripheral     No growth at 24 hours      12-04 @ 13:45 Clean Catch Clean Catch (Midstream)     >100,000 CFU/ml Enterococcus faecalis  <10,000 CFU/ml Normal Urogenital selvin present          RADIOLOGY REVIEWED:    < from: CT Chest No Cont (12.05.23 @ 14:56) >  IMPRESSION:  There are stable peripherally distributed reticular   opacities identified within the bilateral lung parenchyma with comparison   to examination November 10, 2023, without evidence for interval   development of infiltrate or lobar consolidation. No significant traction   bronchiectasis or honeycombing identified. Band type opacity within the   right lower lobe likely related to platelike atelectasis/scarring.            --- End of Report ---    < end of copied text >

## 2023-12-06 NOTE — PROGRESS NOTE ADULT - ASSESSMENT
Patient is a 62y female with a past history of A Fib, obesity,who was transferred to [facility] on 10/5 after a 6 week hospital stay for respiratory distress.  She was evaluated PTA by Rheum and pulmonary for abnormal CXR and was hospitalized and labeled as ILD in late August at [facility].  She received broad spectrum antibiotics with a negative Bronch for infection.She had a positive ARIANA, was diagnosed as a MCT ILD.  She was transferred to [facility], required ECMO, received high dose steroids, plasmaphoresis, and rituximab for ILD.  She was sent to rehab on 10/5, she has been stable, has been on atovaquone for PCP prophylaxis and has been receiving steroids.  She has c/o frequency, has been febrile past 24 hours. Mild headache with fever, no GI symptoms. She is on nasal oxygen, no worsening of dyspnea. No sputum production.  She was born and raised in [place], no family history of significance, no chest pain or sputum production.  Zosyn given as well as bactrim for a possible UTI.  Hence  61 yo female with history of ILD now febrile after high dose sterids and Rituximab,  Her exam is non focal. Her RVP is negative,her steroids have been tapered off.  She is anticoagulated with apixaban and her UA shows on 14 wbc.  The differential is broad, infection vs non infectious fever.  Her prior quant TB was anergic, prior blood cultures and BAL AFB cultures negative.  Her CT scan of chest shows stable disease c/w 11/10, no signs of acute pneumonia  Her urine culture is growing enterococcus, blood cultures are negative so far  Suggest:  1 I am okay with zosyn pending sensitivities, if blood cultures stay negative will consider switch to oral amoxacillin in 24 hours  2 Monitor fever curve, it can be difficult to differentiate asymptomatic bactiuria from a true  infection  3 Additional w/u pending course  4 patient and partners questions answered at bedside . Patient is a 62y female with a past history of A Fib, obesity,who was transferred to [facility] on 10/5 after a 6 week hospital stay for respiratory distress.  She was evaluated PTA by Rheum and pulmonary for abnormal CXR and was hospitalized and labeled as ILD in late August at [facility].  She received broad spectrum antibiotics with a negative Bronch for infection.She had a positive ARIANA, was diagnosed as a MCT ILD.  She was transferred to [facility], required ECMO, received high dose steroids, plasmaphoresis, and rituximab for ILD.  She was sent to rehab on 10/5, she has been stable, has been on atovaquone for PCP prophylaxis and has been receiving steroids.  She has c/o frequency, has been febrile past 24 hours. Mild headache with fever, no GI symptoms. She is on nasal oxygen, no worsening of dyspnea. No sputum production.  She was born and raised in [place], no family history of significance, no chest pain or sputum production.  Zosyn given as well as bactrim for a possible UTI.  Hence  63 yo female with history of ILD now febrile after high dose sterids and Rituximab,  Her exam is non focal. Her RVP is negative,her steroids have been tapered off.  She is anticoagulated with apixaban and her UA shows on 14 wbc.  The differential is broad, infection vs non infectious fever.  Her prior quant TB was anergic, prior blood cultures and BAL AFB cultures negative.  Her CT scan of chest shows stable disease c/w 11/10, no signs of acute pneumonia  Her urine culture is growing enterococcus, blood cultures are negative so far  Suggest:  1 I am okay with zosyn pending sensitivities, if blood cultures stay negative will consider switch to oral amoxacillin in 24 hours  2 Monitor fever curve, it can be difficult to differentiate asymptomatic bactiuria from a true  infection  3 Additional w/u pending course  4 patient and partners questions answered at bedside .

## 2023-12-07 LAB
-  AMPICILLIN: SIGNIFICANT CHANGE UP
-  AMPICILLIN: SIGNIFICANT CHANGE UP
-  CIPROFLOXACIN: SIGNIFICANT CHANGE UP
-  CIPROFLOXACIN: SIGNIFICANT CHANGE UP
-  LEVOFLOXACIN: SIGNIFICANT CHANGE UP
-  LEVOFLOXACIN: SIGNIFICANT CHANGE UP
-  NITROFURANTOIN: SIGNIFICANT CHANGE UP
-  NITROFURANTOIN: SIGNIFICANT CHANGE UP
-  TETRACYCLINE: SIGNIFICANT CHANGE UP
-  TETRACYCLINE: SIGNIFICANT CHANGE UP
-  VANCOMYCIN: SIGNIFICANT CHANGE UP
-  VANCOMYCIN: SIGNIFICANT CHANGE UP
CULTURE RESULTS: ABNORMAL
CULTURE RESULTS: ABNORMAL
METHOD TYPE: SIGNIFICANT CHANGE UP
METHOD TYPE: SIGNIFICANT CHANGE UP
ORGANISM # SPEC MICROSCOPIC CNT: ABNORMAL
ORGANISM # SPEC MICROSCOPIC CNT: ABNORMAL
ORGANISM # SPEC MICROSCOPIC CNT: SIGNIFICANT CHANGE UP
ORGANISM # SPEC MICROSCOPIC CNT: SIGNIFICANT CHANGE UP
SPECIMEN SOURCE: SIGNIFICANT CHANGE UP
SPECIMEN SOURCE: SIGNIFICANT CHANGE UP

## 2023-12-07 PROCEDURE — 99232 SBSQ HOSP IP/OBS MODERATE 35: CPT | Mod: GC

## 2023-12-07 RX ORDER — AMOXICILLIN 250 MG/5ML
500 SUSPENSION, RECONSTITUTED, ORAL (ML) ORAL THREE TIMES A DAY
Refills: 0 | Status: DISCONTINUED | OUTPATIENT
Start: 2023-12-07 | End: 2023-12-13

## 2023-12-07 RX ORDER — LIDOCAINE 4 G/100G
5 CREAM TOPICAL THREE TIMES A DAY
Refills: 0 | Status: COMPLETED | OUTPATIENT
Start: 2023-12-07 | End: 2023-12-10

## 2023-12-07 RX ADMIN — GABAPENTIN 300 MILLIGRAM(S): 400 CAPSULE ORAL at 21:06

## 2023-12-07 RX ADMIN — GABAPENTIN 300 MILLIGRAM(S): 400 CAPSULE ORAL at 15:26

## 2023-12-07 RX ADMIN — PANTOPRAZOLE SODIUM 40 MILLIGRAM(S): 20 TABLET, DELAYED RELEASE ORAL at 08:13

## 2023-12-07 RX ADMIN — PIPERACILLIN AND TAZOBACTAM 25 GRAM(S): 4; .5 INJECTION, POWDER, LYOPHILIZED, FOR SOLUTION INTRAVENOUS at 06:15

## 2023-12-07 RX ADMIN — ZINC OXIDE 1 APPLICATION(S): 200 OINTMENT TOPICAL at 08:17

## 2023-12-07 RX ADMIN — APIXABAN 5 MILLIGRAM(S): 2.5 TABLET, FILM COATED ORAL at 08:13

## 2023-12-07 RX ADMIN — NYSTATIN CREAM 1 APPLICATION(S): 100000 CREAM TOPICAL at 08:17

## 2023-12-07 RX ADMIN — Medication 5 MILLIGRAM(S): at 16:11

## 2023-12-07 RX ADMIN — ATOVAQUONE 1500 MILLIGRAM(S): 750 SUSPENSION ORAL at 21:07

## 2023-12-07 RX ADMIN — LIDOCAINE 2 PATCH: 4 CREAM TOPICAL at 19:00

## 2023-12-07 RX ADMIN — ZINC OXIDE 1 APPLICATION(S): 200 OINTMENT TOPICAL at 15:27

## 2023-12-07 RX ADMIN — Medication 500 MILLIGRAM(S): at 21:18

## 2023-12-07 RX ADMIN — Medication 1 MILLIGRAM(S): at 21:10

## 2023-12-07 RX ADMIN — Medication 6 MILLIGRAM(S): at 21:06

## 2023-12-07 RX ADMIN — LIDOCAINE 1 PATCH: 4 CREAM TOPICAL at 08:16

## 2023-12-07 RX ADMIN — Medication 1 TABLET(S): at 15:27

## 2023-12-07 RX ADMIN — Medication 12.5 MILLIGRAM(S): at 08:14

## 2023-12-07 RX ADMIN — LIDOCAINE 1 PATCH: 4 CREAM TOPICAL at 19:00

## 2023-12-07 RX ADMIN — APIXABAN 5 MILLIGRAM(S): 2.5 TABLET, FILM COATED ORAL at 21:08

## 2023-12-07 RX ADMIN — GABAPENTIN 300 MILLIGRAM(S): 400 CAPSULE ORAL at 08:15

## 2023-12-07 RX ADMIN — Medication 1 TABLET(S): at 21:07

## 2023-12-07 RX ADMIN — Medication 36 MILLIGRAM(S): at 08:13

## 2023-12-07 RX ADMIN — LIDOCAINE 5 MILLILITER(S): 4 CREAM TOPICAL at 15:26

## 2023-12-07 RX ADMIN — Medication 500 MILLIGRAM(S): at 21:08

## 2023-12-07 RX ADMIN — Medication 1 TABLET(S): at 21:08

## 2023-12-07 RX ADMIN — SODIUM CHLORIDE 5 MILLILITER(S): 9 INJECTION INTRAMUSCULAR; INTRAVENOUS; SUBCUTANEOUS at 09:55

## 2023-12-07 RX ADMIN — Medication 12.5 MILLIGRAM(S): at 21:07

## 2023-12-07 RX ADMIN — SODIUM CHLORIDE 5 MILLILITER(S): 9 INJECTION INTRAMUSCULAR; INTRAVENOUS; SUBCUTANEOUS at 20:01

## 2023-12-07 RX ADMIN — LIDOCAINE 2 PATCH: 4 CREAM TOPICAL at 20:36

## 2023-12-07 RX ADMIN — Medication 1 DROP(S): at 08:13

## 2023-12-07 RX ADMIN — Medication 1 DROP(S): at 21:10

## 2023-12-07 NOTE — PROGRESS NOTE ADULT - SUBJECTIVE AND OBJECTIVE BOX
SUBJECTIVE/ROS:  Patient seen and examined at bedside this AM. Partner present.  Admits to intermittent sleep overnight due to IV infiltrate and IV ABX administration.  Cankre sores present.   LBM 12/5, per patient.  Expressed that she will consider further IV fluids once urine sensitivities and blood culture results.   Discussed results of BL LE doppler- negative.     ROS--No chest or abd pain, no palpitations nausea or vomiting    Therapy.  Min A for sit to supine transfers (improved from Mod A x2 persons perviously)  Ambulating 65ft with bariatric RW (improved from 15 ft previously)  Supervision for upper body dressing  Sustained improvement with slide board transfer bed to   Sustained improvement of upper extremity function and strength      Vital Signs Last 24 Hrs  T(C): 37.2 (06 Dec 2023 21:30), Max: 37.2 (06 Dec 2023 21:30)  T(F): 98.9 (06 Dec 2023 21:30), Max: 98.9 (06 Dec 2023 21:30)  HR: 81 (06 Dec 2023 21:30) (81 - 81)  BP: 117/74 (06 Dec 2023 21:30) (117/74 - 117/74)  BP(mean): --  RR: 16 (06 Dec 2023 21:30) (16 - 16)  SpO2: 97% (06 Dec 2023 21:30) (97% - 97%)      PHYSICAL EXAM:  Gen -  Comfortable,  at bedside -normal oxy sat and subsequently self ambulating   Pulm - clear  Cardiovascular - HS i and II intermittently irregular  Abdomen - Soft, non tender,   Extremities - edema to b/l LE, no calf tenderness, LUE Med line    Neuro-  Cognitive - awake, alert, fully oriented,  Light tough sensation diffusely reduced on fingers and dorsal foot, and over right lower jaw, localized area approx 2 cm, no skin changes noted, non progressive   Motor -                    LEFT    UE - 4/5                    RIGHT UE - 4/5                     LEFT    LE - 3+/5, distally and 3/5 proximal                    RIGHT LE - Ankles PF 3/5 ,DF 2/5, Knee ext 3/5 Hips limited by pain Rt hip due to ulcer at ECHO access site   Sensory - decreased light tough sensation fingers and sole of foot, difusely  MSK: improvement with motor strength  Psychiatric - Mood stable, Affect WNL  Skin -- , stage 2 pressure injury to right buttock 4 x1 and left buttock 3x1, stage 2 pressure injury ti right inner thigh 0.2 x 0.2, right groin wound 10.5 x 2.0, Left groin wound 0.6x1.8.0.1cm  Mild discomfort on pressure over Rt inguinal ligament, but no swelling or erythema  MMT--Able to contract B/L upper back muscles, EF/EE 4/5 distally, knee Est 3+/5      LABS:            MEDICATIONS  (STANDING):  apixaban 5 milliGRAM(s) Oral every 12 hours  artificial  tears Solution 1 Drop(s) Both EYES every 12 hours  ascorbic acid 500 milliGRAM(s) Oral daily  atovaquone  Suspension 1500 milliGRAM(s) Oral daily  dextrose 5%. 1000 milliLiter(s) (50 mL/Hr) IV Continuous <Continuous>  dextrose 5%. 1000 milliLiter(s) (100 mL/Hr) IV Continuous <Continuous>  dextrose 50% Injectable 12.5 Gram(s) IV Push once  dextrose 50% Injectable 25 Gram(s) IV Push once  dextrose 50% Injectable 25 Gram(s) IV Push once  folic acid 1 milliGRAM(s) Oral daily  gabapentin 300 milliGRAM(s) Oral three times a day  glucagon  Injectable 1 milliGRAM(s) IntraMuscular once  lactobacillus acidophilus 1 Tablet(s) Oral two times a day with meals  lidocaine   4% Patch 2 Patch Transdermal <User Schedule>  lidocaine   4% Patch 1 Patch Transdermal <User Schedule>  lidocaine 2% Viscous 5 milliLiter(s) Swish and Spit three times a day  melatonin 6 milliGRAM(s) Oral at bedtime  methylPREDNISolone 36 milliGRAM(s) Oral daily  metoprolol tartrate 12.5 milliGRAM(s) Oral two times a day  multivitamin 1 Tablet(s) Oral daily  nystatin Powder 1 Application(s) Topical two times a day  pantoprazole    Tablet 40 milliGRAM(s) Oral before breakfast  piperacillin/tazobactam IVPB.. 3.375 Gram(s) IV Intermittent every 8 hours  polyethylene glycol 3350 17 Gram(s) Oral <User Schedule>  sodium chloride 0.9% Bolus 1000 milliLiter(s) IV Bolus once  sodium chloride 0.9% lock flush 5 milliLiter(s) IV Push two times a day  sodium chloride 0.9%. 1000 milliLiter(s) (100 mL/Hr) IV Continuous <Continuous>  zinc oxide 40% Paste 1 Application(s) Topical three times a day    MEDICATIONS  (PRN):  acetaminophen     Tablet .. 650 milliGRAM(s) Oral every 6 hours PRN Temp greater or equal to 38C (100.4F), Mild Pain (1 - 3), Moderate Pain (4 - 6)  albuterol/ipratropium for Nebulization 3 milliLiter(s) Nebulizer every 6 hours PRN Shortness of Breath and/or Wheezing  ALPRAZolam 0.25 milliGRAM(s) Oral every 6 hours PRN Anxiety  aluminum hydroxide/magnesium hydroxide/simethicone Suspension 30 milliLiter(s) Oral every 4 hours PRN Dyspepsia  ammonium lactate 12% Lotion 1 Application(s) Topical every 12 hours PRN BL feet dryness  baclofen 5 milliGRAM(s) Oral every 8 hours PRN Musculoskeletal Pain  benzocaine/menthol Lozenge 1 Lozenge Oral two times a day PRN Sore Throat  benzonatate 100 milliGRAM(s) Oral three times a day PRN Cough  bisacodyl 5 milliGRAM(s) Oral daily PRN Constipation  dextrose Oral Gel 15 Gram(s) Oral once PRN Blood Glucose LESS THAN 70 milliGRAM(s)/deciliter  hydrocortisone hemorrhoidal Suppository 1 Suppository(s) Rectal two times a day PRN Hemorrhoids  loperamide 2 milliGRAM(s) Oral three times a day PRN Diarrhea  SIMETHICONE SOFTGELS 250 milliGRAM(s),SIMETHICONE SOFTGELS 250 milliGRAM(s) 250 milliGRAM(s) Oral three times a day PRN for gas  sodium chloride 0.65% Nasal 1 Spray(s) Both Nostrils every 8 hours PRN Nasal Congestion    SUBJECTIVE/ROS:  Patient seen and examined at bedside this AM. Partner present.  Admits to intermittent sleep overnight due to IV infiltrate and IV ABX administration.  Cankre sores present.   LBM 12/6, per patient., voiding without issues  Expressed that she will consider further IV fluids once urine sensitivities and blood culture results.   Discussed results of BL LE doppler- negative.     ROS--No chest or abd pain, no palpitations nausea or vomiting    Therapy.  Min A for sit to supine transfers (improved from Mod A x2 persons perviously)  Ambulating 65ft with bariatric RW (improved from 15 ft previously)  Supervision for upper body dressing  Sustained improvement with slide board transfer bed to   Sustained improvement of upper extremity function and strength      Vital Signs Last 24 Hrs  T(C): 37.2 (06 Dec 2023 21:30), Max: 37.2 (06 Dec 2023 21:30)  T(F): 98.9 (06 Dec 2023 21:30), Max: 98.9 (06 Dec 2023 21:30)  HR: 81 (06 Dec 2023 21:30) (81 - 81)  BP: 117/74 (06 Dec 2023 21:30) (117/74 - 117/74)  RR: 16 (06 Dec 2023 21:30) (16 - 16)  SpO2: 97% (06 Dec 2023 21:30) (97% - 97%)      PHYSICAL EXAM:  Gen -  Comfortable,  at bedside -normal oxy sat and subsequently self ambulating   Pulm - clear  Cardiovascular - HS i and II intermittently irregular  Abdomen - Soft, non tender,   Extremities - edema to b/l LE, no calf tenderness, LUE Med line    Neuro-  Cognitive - awake, alert, fully oriented,  Light tough sensation diffusely reduced on fingers and dorsal foot, and over right lower jaw, localized area approx 2 cm, no skin changes noted, non progressive   Motor -                    LEFT    UE - 4/5                    RIGHT UE - 4/5                     LEFT    LE - 3+/5, distally and 3/5 proximal                    RIGHT LE - Ankles PF 3/5 ,DF 2/5, Knee ext 3/5 Hips limited by pain Rt hip due to ulcer at ECHO access site   Sensory - decreased light tough sensation fingers and sole of foot, difusely  MSK: improvement with motor strength  Psychiatric - Mood stable, Affect WNL  Skin -- , stage 2 pressure injury to right buttock 4 x1 and left buttock 3x1, stage 2 pressure injury ti right inner thigh 0.2 x 0.2, right groin wound 10.5 x 2.0, Left groin wound 0.6x1.8.0.1cm  Mild discomfort on pressure over Rt inguinal ligament, but no swelling or erythema  MMT--Able to contract B/L upper back muscles, EF/EE 4/5 distally, knee Est 3+/5      MEDICATIONS  (STANDING):  apixaban 5 milliGRAM(s) Oral every 12 hours  artificial  tears Solution 1 Drop(s) Both EYES every 12 hours  ascorbic acid 500 milliGRAM(s) Oral daily  atovaquone  Suspension 1500 milliGRAM(s) Oral daily  dextrose 5%. 1000 milliLiter(s) (50 mL/Hr) IV Continuous <Continuous>  dextrose 5%. 1000 milliLiter(s) (100 mL/Hr) IV Continuous <Continuous>  dextrose 50% Injectable 12.5 Gram(s) IV Push once  dextrose 50% Injectable 25 Gram(s) IV Push once  dextrose 50% Injectable 25 Gram(s) IV Push once  folic acid 1 milliGRAM(s) Oral daily  gabapentin 300 milliGRAM(s) Oral three times a day  glucagon  Injectable 1 milliGRAM(s) IntraMuscular once  lactobacillus acidophilus 1 Tablet(s) Oral two times a day with meals  lidocaine   4% Patch 2 Patch Transdermal <User Schedule>  lidocaine   4% Patch 1 Patch Transdermal <User Schedule>  lidocaine 2% Viscous 5 milliLiter(s) Swish and Spit three times a day  melatonin 6 milliGRAM(s) Oral at bedtime  methylPREDNISolone 36 milliGRAM(s) Oral daily  metoprolol tartrate 12.5 milliGRAM(s) Oral two times a day  multivitamin 1 Tablet(s) Oral daily  nystatin Powder 1 Application(s) Topical two times a day  pantoprazole    Tablet 40 milliGRAM(s) Oral before breakfast  piperacillin/tazobactam IVPB.. 3.375 Gram(s) IV Intermittent every 8 hours  polyethylene glycol 3350 17 Gram(s) Oral <User Schedule>  sodium chloride 0.9% Bolus 1000 milliLiter(s) IV Bolus once  sodium chloride 0.9% lock flush 5 milliLiter(s) IV Push two times a day  sodium chloride 0.9%. 1000 milliLiter(s) (100 mL/Hr) IV Continuous <Continuous>  zinc oxide 40% Paste 1 Application(s) Topical three times a day    MEDICATIONS  (PRN):  acetaminophen     Tablet .. 650 milliGRAM(s) Oral every 6 hours PRN Temp greater or equal to 38C (100.4F), Mild Pain (1 - 3), Moderate Pain (4 - 6)  albuterol/ipratropium for Nebulization 3 milliLiter(s) Nebulizer every 6 hours PRN Shortness of Breath and/or Wheezing  ALPRAZolam 0.25 milliGRAM(s) Oral every 6 hours PRN Anxiety  aluminum hydroxide/magnesium hydroxide/simethicone Suspension 30 milliLiter(s) Oral every 4 hours PRN Dyspepsia  ammonium lactate 12% Lotion 1 Application(s) Topical every 12 hours PRN BL feet dryness  baclofen 5 milliGRAM(s) Oral every 8 hours PRN Musculoskeletal Pain  benzocaine/menthol Lozenge 1 Lozenge Oral two times a day PRN Sore Throat  benzonatate 100 milliGRAM(s) Oral three times a day PRN Cough  bisacodyl 5 milliGRAM(s) Oral daily PRN Constipation  dextrose Oral Gel 15 Gram(s) Oral once PRN Blood Glucose LESS THAN 70 milliGRAM(s)/deciliter  hydrocortisone hemorrhoidal Suppository 1 Suppository(s) Rectal two times a day PRN Hemorrhoids  loperamide 2 milliGRAM(s) Oral three times a day PRN Diarrhea  SIMETHICONE SOFTGELS 250 milliGRAM(s),SIMETHICONE SOFTGELS 250 milliGRAM(s) 250 milliGRAM(s) Oral three times a day PRN for gas  sodium chloride 0.65% Nasal 1 Spray(s) Both Nostrils every 8 hours PRN Nasal Congestion

## 2023-12-07 NOTE — PROGRESS NOTE ADULT - SUBJECTIVE AND OBJECTIVE BOX
CC: f/u for    Patient reports    REVIEW OF SYSTEMS:  All other review of systems negative (Comprehensive ROS)    Antimicrobials Day #  :  amoxicillin 500 milliGRAM(s) Oral three times a day day 3 /10  atovaquone  Suspension 1500 milliGRAM(s) Oral daily    Other Medications Reviewed    T(F): 98.4 (12-07-23 @ 08:24), Max: 98.9 (12-06-23 @ 21:30)  HR: 76 (12-07-23 @ 08:24)  BP: 139/76 (12-07-23 @ 08:24)  RR: 16 (12-07-23 @ 08:24)  SpO2: 97% (12-07-23 @ 08:24)  Wt(kg): --    PHYSICAL EXAM:  General: alert, no acute distress  Eyes:  anicteric, no conjunctival injection, no discharge  Oropharynx: no lesions or injection 	  Neck: supple, without adenopathy  Lungs: coarse  to auscultation  Heart: regular rate and rhythm; no murmur, rubs or gallops  Abdomen: soft, nondistended, nontender, without mass or organomegaly  Skin: no lesions  Extremities: no clubbing, cyanosis,. hasr edema  Neurologic: alert, oriented, moves all extremities    LAB RESULTS:              MICROBIOLOGY:  RECENT CULTURES:  12-05 @ 11:00 .Blood Blood-Peripheral     No growth at 48 Hours      12-04 @ 13:45 Clean Catch Clean Catch (Midstream) Enterococcus faecalis    >100,000 CFU/ml Enterococcus faecalis  <10,000 CFU/ml Normal Urogenital selvin present          RADIOLOGY REVIEWED:    < from: CT Chest No Cont (12.05.23 @ 14:56) >    ACC: [id number] EXAM:  CT CHEST   ORDERED BY: MARY URRUTIA     PROCEDURE DATE:  12/05/2023          INTERPRETATION:  CLINICAL INFORMATION: Fever. Evaluate for pneumonia.    COMPARISON: November 10, 2023.    CONTRAST/COMPLICATIONS:  IV Contrast: None  Oral Contrast: None  Complications: Not applicable    PROCEDURE:  CT of the Chest was performed.  Sagittal and coronal reformats were performed.    FINDINGS:    LUNGS AND AIRWAYS: Patent central airways.  There are stable peripherally   distributed reticular opacities identified within the bilateral lung   parenchyma with comparison to examination November 10, 2023, without   evidence for interval development of infiltrate or lobar consolidation.   No significant traction bronchiectasis or honeycombing identified. Band   type opacity within the right lower lobe likely related to platelike   atelectasis/scarring.  PLEURA: No pleural effusion.  MEDIASTINUM AND NIRMALA: No lymphadenopathy.  VESSELS: Within normal limits.  HEART: Heart size is normal. No pericardial effusion.  CHEST WALL AND LOWER NECK: Within normal limits.  VISUALIZED UPPER ABDOMEN: Gallstones. The adrenal glands appear   unremarkable.  BONES: Degenerative changes.    IMPRESSION:  There are stable peripherally distributed reticular   opacities identified within the bilateral lung parenchyma with comparison   to examination November 10, 2023, without evidence for interval   development of infiltrate or lobar consolidation. No significant traction   bronchiectasis or honeycombing identified. Band type opacity within the   right lower lobe likely related to platelike atelectasis/scarring.        SEAMUS HERNANDEZ MD; Attending Radiologist    < end of copied text >    < from: US Duplex Venous Lower Ext Complete, Bilateral (12.06.23 @ 21:56) >    ACC: [id number] EXAM:  US DPLX LWR EXT VEINS COMPL BI   ORDERED BY:   JEANINE STEVENS     PROCEDURE DATE:  12/06/2023          INTERPRETATION:  CLINICAL INFORMATION: Shortness of breath.  Lymphedema.    COMPARISON: 10/06/2023    TECHNIQUE: Duplex sonography of the BILATERAL LOWER extremity veins with   color and spectral Doppler, with and without compression.    FINDINGS:    RIGHT:  Normal compressibility of the RIGHT common femoral, femoral and popliteal   veins.  Doppler examination shows normal spontaneous and phasic flow.  No RIGHT calf vein thrombosis is detected.    LEFT:  Normal compressibility of the LEFT common femoral, femoral and popliteal   veins.  Doppler examination shows normal spontaneous and phasic flow.  No LEFT calf vein thrombosis is detected.  Subcutaneous edema is visualized left popliteal region and calf.    IMPRESSION:  No evidence of deep venous thrombosis in either lower extremity.  Subcutaneous edema is visualized in the left lower extremity.          --- End ofReport ---          < end of copied text >          Assessment:  Patient here at rehab for 2 months after a prolonged hospital stay for respiratory failure, dx with mct /ild, had ecmo, rituximab, plasmapharesis, remains on steroids so on atovaquone prophylaxis. She had recent fever, had zosyn then changed to po levaquin for uti. She grew enterococcus form the urine, not bacteremic. It is ok to do amoxicillin po for pyelo  Plan:  po amoxicllin for another week CC: f/u for    Patient reports    REVIEW OF SYSTEMS:  All other review of systems negative (Comprehensive ROS)    Antimicrobials Day #  :  amoxicillin 500 milliGRAM(s) Oral three times a day day 3 /10  atovaquone  Suspension 1500 milliGRAM(s) Oral daily    Other Medications Reviewed    T(F): 98.4 (12-07-23 @ 08:24), Max: 98.9 (12-06-23 @ 21:30)  HR: 76 (12-07-23 @ 08:24)  BP: 139/76 (12-07-23 @ 08:24)  RR: 16 (12-07-23 @ 08:24)  SpO2: 97% (12-07-23 @ 08:24)  Wt(kg): --    PHYSICAL EXAM:  General: alert, no acute distress  Eyes:  anicteric, no conjunctival injection, no discharge  Oropharynx: no lesions or injection 	  Neck: supple, without adenopathy  Lungs: coarse  to auscultation  Heart: regular rate and rhythm; no murmur, rubs or gallops  Abdomen: soft, nondistended, nontender, without mass or organomegaly  Skin: no lesions  Extremities: no clubbing, cyanosis,. hasr edema  Neurologic: alert, oriented, moves all extremities    LAB RESULTS:              MICROBIOLOGY:  RECENT CULTURES:  12-05 @ 11:00 .Blood Blood-Peripheral     No growth at 48 Hours      12-04 @ 13:45 Clean Catch Clean Catch (Midstream) Enterococcus faecalis    >100,000 CFU/ml Enterococcus faecalis  <10,000 CFU/ml Normal Urogenital selvin present          RADIOLOGY REVIEWED:    < from: CT Chest No Cont (12.05.23 @ 14:56) >    ACC: [id number] EXAM:  CT CHEST   ORDERED BY: MARY URRUTIA     PROCEDURE DATE:  12/05/2023          INTERPRETATION:  CLINICAL INFORMATION: Fever. Evaluate for pneumonia.    COMPARISON: November 10, 2023.    CONTRAST/COMPLICATIONS:  IV Contrast: None  Oral Contrast: None  Complications: Not applicable    PROCEDURE:  CT of the Chest was performed.  Sagittal and coronal reformats were performed.    FINDINGS:    LUNGS AND AIRWAYS: Patent central airways.  There are stable peripherally   distributed reticular opacities identified within the bilateral lung   parenchyma with comparison to examination November 10, 2023, without   evidence for interval development of infiltrate or lobar consolidation.   No significant traction bronchiectasis or honeycombing identified. Band   type opacity within the right lower lobe likely related to platelike   atelectasis/scarring.  PLEURA: No pleural effusion.  MEDIASTINUM AND NIRMALA: No lymphadenopathy.  VESSELS: Within normal limits.  HEART: Heart size is normal. No pericardial effusion.  CHEST WALL AND LOWER NECK: Within normal limits.  VISUALIZED UPPER ABDOMEN: Gallstones. The adrenal glands appear   unremarkable.  BONES: Degenerative changes.    IMPRESSION:  There are stable peripherally distributed reticular   opacities identified within the bilateral lung parenchyma with comparison   to examination November 10, 2023, without evidence for interval   development of infiltrate or lobar consolidation. No significant traction   bronchiectasis or honeycombing identified. Band type opacity within the   right lower lobe likely related to platelike atelectasis/scarring.        SEAMUS HERNANDEZ MD; Attending Radiologist    < end of copied text >    < from: US Duplex Venous Lower Ext Complete, Bilateral (12.06.23 @ 21:56) >    ACC: [id number] EXAM:  US DPLX LWR EXT VEINS COMPL BI   ORDERED BY:   JEANINE STEVENS     PROCEDURE DATE:  12/06/2023          INTERPRETATION:  CLINICAL INFORMATION: Shortness of breath.  Lymphedema.    COMPARISON: 10/06/2023    TECHNIQUE: Duplex sonography of the BILATERAL LOWER extremity veins with   color and spectral Doppler, with and without compression.    FINDINGS:    RIGHT:  Normal compressibility of the RIGHT common femoral, femoral and popliteal   veins.  Doppler examination shows normal spontaneous and phasic flow.  No RIGHT calf vein thrombosis is detected.    LEFT:  Normal compressibility of the LEFT common femoral, femoral and popliteal   veins.  Doppler examination shows normal spontaneous and phasic flow.  No LEFT calf vein thrombosis is detected.  Subcutaneous edema is visualized left popliteal region and calf.    IMPRESSION:  No evidence of deep venous thrombosis in either lower extremity.  Subcutaneous edema is visualized in the left lower extremity.          --- End ofReport ---          < end of copied text >          Assessment:  Patient here at rehab for 2 months after a prolonged hospital stay for respiratory failure, dx with mct /ild, had ecmo, rituximab, plasmapharesis, remains on steroids so on atovaquone prophylaxis. She had recent fever, had zosyn then changed to po levaquin for uti. She grew enterococcus form the urine, not bacteremic. It is ok to do amoxicillin po for pyelo  Plan:  po amoxicllin for another week

## 2023-12-07 NOTE — PROGRESS NOTE ADULT - ASSESSMENT
Assessment/Plan:  ALIZA FOLEY is a 62 year old female with PMH of pAFIB and sciatica; who presented to [facility] ED with SOB, and was found to have groundglass opacities and interstitial lung disease. She was treated with empiric Vancomycin and Zosyn. She underwent a bronchoscopy with bronchoalveolar lavage and pulse steroids. She was given IV diuretics for increased pulmonary congestion, but her respiratory status continued to worsen.  On 9/13, she was transferred to [facility] for ECMO requirements. She was further treated with Plasmapheresis, Rituximab and high-dose steroids, with significant improvement. Her overall hospital course was complicated by thrombocytopenia (s/p several platelet transfusions), leukocytosis (infectious workup entirely negative- s/p Vanco and Ceftriaxone), AFIB with RVR (resolved with Metoprolol), dysphagia (requiring NGT), transaminitis (secondary to acute illness and hypotension),  non-occlusive RIJ DVT (started on Lovenox). Patient now admitted for a multidisciplinary rehab program. 10-05-23 @ 13:18    * Sustained functional improvement including progress with Wt bearing LE and improvement with ability at transfers, concentrate on prox LE muscle strengthening  * Making further progress, increased ambulatory distance with walker up to 80 ft  * UTI - on Zosyn - await urine and blood cultures - patient will consider additional IV fluids pending urine and blood culture results   * Repeat BL LE doppler negative  * Cankre sore- Lidocaine swish and spit TID x3 days     #Interstitial Lung Disease and Mixed connective tissue disease  - Gait Instability, ADL impairments and Functional impairments: start Comprehensive Rehab Program of PT/OT/SLP - 3 hours a day, 5 days a week  - P&O as needed   - Non-specific Interstitial Lung Disease  - Requiring ECMO   - Improvement s/p Plasmapheresis, Rituximab and high- dose steroids   - Albuterol/Ipratropium Nebulizer every 6 hours  - Mepron 1500mg daily  - PO Medrol ( due to liver dysfunction): Plan will be to taper by ~20% every 2 weeks    Medrol   64mg x 2 weeks   56mg x 2 weeks  44mg x 2 weeks   36mg x 2 weeks - Follow up Outpatient   - Plan to wean 02 as tolerated, Continue tapering oxygen,  -  CT Chest noted 11/10---Resp f/u review  11/14, appreciated  They reports sustained improvement, clinical (normal oxy sats on R/A, sustained progress with oxy tapering), and radiological (no acute findings on recent CT Chest, rather residual chronic infiltrative diesease noted, with recommendation to repeat CT after Acute rehab treatment     #Fever  - Recent UTI on Bactrim (since DCd)  - UCX with enterococcus faecalis- await sensitivities   - 101.4 fever overnight (12/4-12/5)  - Lactate of 2.5- patient refused IV fluids   - Start Zosyn IVPB 12/6 (pt agreeable)  - RVP negative  - Await blood cultures, CXR  - ID consult and Pulm re-eval (no respiratory symptoms)  - Pulm recs: continue to monitor   - ID recs: CT chest- stable (12/5)   - Repeat Lactate of 3.1 - agreeable to IV fluids- s/p 1000mL bolus   - BL LE doppler- negative     #Cankre sore  - Lidocaine swish and spit TID x3 days     #Knee buckling  - 12/1: S/p guided fall with physical therapy  - Back muscle strain with catching self on RW   - Lidocaine patch to back and BL deltoids  - Ibuprofen 400mg x1 dose     #Experiencing R mandible and occipital numbness, with associated hair loss- outpatient follow up with Rheumatology    #Posttnasal drip--ENT review 11/7, declined scope, continue flonase     #pAFIB/Rt Ij thrombus  - Metoprolol 25mg BID and lovenox  --- Eliquis 5mg BID - tolerating well     #Chronic Tinnitus   - ENT review and recs appreciate, Patient already made ENT appointment for f/u    #Lymphedema both legs -chronic and Rt IJ thrombus  - ACE Wrap BL LEs  - BL LE doppler negative for DVT  - BL UE doppler with chronic thrombotic changes in RIJ     #Transaminitis --LFT Down trending   - Secondary to acute illness and hypotension at [facility]  - Outpatient follow up     #Leucocytosis--steroid related, continue monitoring     #Neuropathy  - Gabapentin 300 mg BID, will consider increasing dose, increase to TID 11/10  --Work on motor strengthening, priority for UE as preferred by patient   - Vit b12 >2,000 in 9/2023  --Will consider Rhuematology f/u if needed    #Sleep/Mood  - Melatonin 6mg at HS  - Psych rec Xanax 0.25mg PRN    #Skin  - Skin: Left lower abdomen ruptured blister 3.0 x 1.0, stage 2 pressure injury to right buttock 4 x1 and left buttock 3x1, stage 2 pressure injury ti right inner thigh 0.2 x 0.2, right groin wound 10.5 x 2.0,   Wound care review, Left groin wound 11/15--- 0.6x1.8.0.1cm, no slough, healthy base  -- MAD to skin folds- Nystatin to submammary and abdominal pannus BID  -- MAD to L groin- cleanse with NS, Triad cream daily  -- Mad to R groin- Nystatin powder daily   -- R groin wound-- aquacel packing, Triad to periwound, Allevyn foam- daily and PRN   - Pressure injury/Skin: OOB to Chair, PT/OT   - Offload pressure, low air loss support surfaces, T&P every 2 hours   --Wound care consult-10/9 -Multiple wounds--perineal and buttock/sacral, recs appreciated   --Suggest Continue treatments as below:   Twice daily & PRN Normal Saline Cleanse of abdominal pannus & breast folds, perineal, Left & Right inner buttock erosions.  Pat thoroughly dry.   Apply Desitin generously twice daily (& PRN) to perineal, buttocks, and left groin erosions, leave open to air.    Apply Nystatin powder to abdominal pannus and breast folds.  Suggest use of Interdry cloths in folds to 'wick' moisture.  Suggest daily Normal Saline Cleanse of right groin surgical wound, pat dry.  Apply Cavillon film barrier to intact periwound skin.  Place Aquacell strip over open area, cover with foam dressing.  - Wound care following     #Pain Mgmt   - Tylenol PRN   - Gabapentin 300mg at HS     #GI/Bowel Mgmt/Hx of alternating bowel movements  - Pantoprazole  - PRN: Maalox   --Lactobacillus BID     #/Bladder Mgmt   -Spontaneously voiding   - UA (11/3) negative  - 12/4: Urinary frequency- UA with moderate Leuks- started on Bactrim (since DCd)  - Await UCx  - Started on Zosyn IVPB     #FEN   #Dysphagia  - Diet - Minced & Moist  [CC]    - Dysphagia- SLP evaluation     #Health maintenance  - Folic Acid   - MVI  - Ocean nasal spray    # Difficulty with access to peripheral veins-- Blood draws from Left arm Medline     #Precautions / PROPHYLAXIS:   - Falls  - ortho: Weight bearing status: WBAT   - Lungs: Aspiration, Incentive Spirometer   - DVT PPX: Eliquis 5 mg BID   ----------------------------------------  11/28 --Labs CBC mild anemia, normal renal function, LFT elevated, downtrending overall unremarkable   ----------------------------------------  Liaison with other providers/agencies    PEER TO PEER with Dr Tripp (patient's insurance company)  * Peer to Peer completed, insurance approval retrospectively given to cover from 11/14 to 11/20, Insurance co awaits updated notes to determine further approval of coverage   SW team to send updated clinical and functional notes to medical insurance company  ----------------------------------------  IDT conference on 12/5  Social Work: Awaiting insurance response on clinicals  SLP: --   OT: Setup for eating/grooming. Sup for UBD. Min A for LBD. Mod A x2 for shower transfer. CG with SB for toilet transfer. Mod A x1 for toileting/hygiene.  PT: Min A for sit to supine. Mod A for supine to sit. Max A for transfers. Min A x1 for stand pivot transfer with RW. Amb 65 ft with bariatric RW and WCF with min A. 6 inch step mod A x2.   RT: Limited participation.   DME: Bariatric RW, WC, drop arm commode  Barriers: New fevers  TDD: 12/13/23 to home.  ----------------------------------------  OUTPATIENT/FOLLOW UP:    Trae Whitney  Pulmonary Disease  [address], [address] [place] [address]  Phone: [phone]  Fax: [phone]  Follow Up Time: 2 weeks    Ryanne Allen  Rheumatology  [address] [address]  Phone: [phone]  Fax: [phone]  Follow Up Time: 1 week    Kyle Pierce  Internal Medicine  [address] [place] [address], [address]  [place] [address]  Phone: [phone]  Fax: [phone]  Follow Up Time: 2 weeks    Addy Powers  Pulmonary Disease  [address] [facility], [address]  [place], [place] [address]  Phone: [phone]  Fax: [phone]  Follow Up Time:     Kwame Hawley  Critical Care Medicine  [address] [facility], [address]  [place] [address]  Phone: [phone]  Fax: [phone]  Follow Up Time:     Neena Borrego  Rheumatology  [address] [facility], [address]  [place] [address]  Phone: [phone]  Fax: [phone]  Follow Up Time:    [facility]  [facility] [address]  Scheduled Appointment: 09/13/2023  2:40 PM     Assessment/Plan:  ALIZA FOLEY is a 62 year old female with PMH of pAFIB and sciatica; who presented to [facility] ED with SOB, and was found to have groundglass opacities and interstitial lung disease. She was treated with empiric Vancomycin and Zosyn. She underwent a bronchoscopy with bronchoalveolar lavage and pulse steroids. She was given IV diuretics for increased pulmonary congestion, but her respiratory status continued to worsen.  On 9/13, she was transferred to [facility] for ECMO requirements. She was further treated with Plasmapheresis, Rituximab and high-dose steroids, with significant improvement. Her overall hospital course was complicated by thrombocytopenia (s/p several platelet transfusions), leukocytosis (infectious workup entirely negative- s/p Vanco and Ceftriaxone), AFIB with RVR (resolved with Metoprolol), dysphagia (requiring NGT), transaminitis (secondary to acute illness and hypotension),  non-occlusive RIJ DVT (started on Lovenox). Patient now admitted for a multidisciplinary rehab program. 10-05-23 @ 13:18    * Sustained functional improvement including progress with Wt bearing LE and improvement with ability at transfers, concentrate on prox LE muscle strengthening  * Making further progress, increased ambulatory distance with walker up to 80 ft  * UTI - on Zosyn - await urine and blood cultures - patient will consider additional IV fluids pending urine and blood culture results   * Repeat BL LE doppler negative  * Cankre sore- Lidocaine swish and spit TID x3 days     #Interstitial Lung Disease and Mixed connective tissue disease  - Gait Instability, ADL impairments and Functional impairments: start Comprehensive Rehab Program of PT/OT/SLP - 3 hours a day, 5 days a week  - P&O as needed   - Non-specific Interstitial Lung Disease  - Requiring ECMO   - Improvement s/p Plasmapheresis, Rituximab and high- dose steroids   - Albuterol/Ipratropium Nebulizer every 6 hours  - Mepron 1500mg daily  - PO Medrol ( due to liver dysfunction): Plan will be to taper by ~20% every 2 weeks    Medrol   64mg x 2 weeks   56mg x 2 weeks  44mg x 2 weeks   36mg x 2 weeks - Follow up Outpatient   - Plan to wean 02 as tolerated, Continue tapering oxygen,  -  CT Chest noted 11/10---Resp f/u review  11/14, appreciated  They reports sustained improvement, clinical (normal oxy sats on R/A, sustained progress with oxy tapering), and radiological (no acute findings on recent CT Chest, rather residual chronic infiltrative diesease noted, with recommendation to repeat CT after Acute rehab treatment     #Fever  - Recent UTI on Bactrim (since DCd)  - UCX with enterococcus faecalis- await sensitivities   - 101.4 fever overnight (12/4-12/5)  - Lactate of 2.5- patient refused IV fluids   - Start Zosyn IVPB 12/6 (pt agreeable)  - RVP negative  - Await blood cultures, CXR  - ID consult and Pulm re-eval (no respiratory symptoms)  - Pulm recs: continue to monitor   - ID recs: CT chest- stable (12/5)   - Repeat Lactate of 3.1 - agreeable to IV fluids- s/p 1000mL bolus   - BL LE doppler- negative     #Cankre sore  - Lidocaine swish and spit TID x3 days     #Knee buckling  - 12/1: S/p guided fall with physical therapy  - Back muscle strain with catching self on RW   - Lidocaine patch to back and BL deltoids  - Ibuprofen 400mg x1 dose     #Experiencing R mandible and occipital numbness, with associated hair loss- outpatient follow up with Rheumatology    #Posttnasal drip--ENT review 11/7, declined scope, continue flonase     #pAFIB/Rt Ij thrombus  - Metoprolol 25mg BID and lovenox  --- Eliquis 5mg BID - tolerating well     #Chronic Tinnitus   - ENT review and recs appreciate, Patient already made ENT appointment for f/u    #Lymphedema both legs -chronic and Rt IJ thrombus  - ACE Wrap BL LEs  - BL LE doppler negative for DVT  - BL UE doppler with chronic thrombotic changes in RIJ     #Transaminitis --LFT Down trending   - Secondary to acute illness and hypotension at [facility]  - Outpatient follow up     #Leucocytosis--steroid related, continue monitoring     #Neuropathy  - Gabapentin 300 mg BID, will consider increasing dose, increase to TID 11/10  --Work on motor strengthening, priority for UE as preferred by patient   - Vit b12 >2,000 in 9/2023  --Will consider Rhuematology f/u if needed    #Sleep/Mood  - Melatonin 6mg at HS  - Psych rec Xanax 0.25mg PRN    #Skin  - Skin: Left lower abdomen ruptured blister 3.0 x 1.0, stage 2 pressure injury to right buttock 4 x1 and left buttock 3x1, stage 2 pressure injury ti right inner thigh 0.2 x 0.2, right groin wound 10.5 x 2.0,   Wound care review, Left groin wound 11/15--- 0.6x1.8.0.1cm, no slough, healthy base  -- MAD to skin folds- Nystatin to submammary and abdominal pannus BID  -- MAD to L groin- cleanse with NS, Triad cream daily  -- Mad to R groin- Nystatin powder daily   -- R groin wound-- aquacel packing, Triad to periwound, Allevyn foam- daily and PRN   - Pressure injury/Skin: OOB to Chair, PT/OT   - Offload pressure, low air loss support surfaces, T&P every 2 hours   --Wound care consult-10/9 -Multiple wounds--perineal and buttock/sacral, recs appreciated   --Suggest Continue treatments as below:   Twice daily & PRN Normal Saline Cleanse of abdominal pannus & breast folds, perineal, Left & Right inner buttock erosions.  Pat thoroughly dry.   Apply Desitin generously twice daily (& PRN) to perineal, buttocks, and left groin erosions, leave open to air.    Apply Nystatin powder to abdominal pannus and breast folds.  Suggest use of Interdry cloths in folds to 'wick' moisture.  Suggest daily Normal Saline Cleanse of right groin surgical wound, pat dry.  Apply Cavillon film barrier to intact periwound skin.  Place Aquacell strip over open area, cover with foam dressing.  - Wound care following     #Pain Mgmt   - Tylenol PRN   - Gabapentin 300mg at HS     #GI/Bowel Mgmt/Hx of alternating bowel movements  - Pantoprazole  - PRN: Maalox   --Lactobacillus BID     #/Bladder Mgmt   -Spontaneously voiding   - UA (11/3) negative  - 12/4: Urinary frequency- UA with moderate Leuks- started on Bactrim (since DCd)  - Await UCx  - Started on Zosyn IVPB     #FEN   #Dysphagia  - Diet - Minced & Moist  [CC]    - Dysphagia- SLP evaluation     #Health maintenance  - Folic Acid   - MVI  - Ocean nasal spray    # Difficulty with access to peripheral veins-- Blood draws from Left arm Medline     #Precautions / PROPHYLAXIS:   - Falls  - ortho: Weight bearing status: WBAT   - Lungs: Aspiration, Incentive Spirometer   - DVT PPX: Eliquis 5 mg BID   ----------------------------------------  11/28 --Labs CBC mild anemia, normal renal function, LFT elevated, downtrending overall unremarkable   ----------------------------------------  Liaison with other providers/agencies    PEER TO PEER with Dr Tripp (patient's insurance company)  * Peer to Peer completed, insurance approval retrospectively given to cover from 11/14 to 11/20, Insurance co awaits updated notes to determine further approval of coverage   SW team to send updated clinical and functional notes to medical insurance company  ----------------------------------------  IDT conference on 12/5  Social Work: Awaiting insurance response on clinicals  SLP: --   OT: Setup for eating/grooming. Sup for UBD. Min A for LBD. Mod A x2 for shower transfer. CG with SB for toilet transfer. Mod A x1 for toileting/hygiene.  PT: Min A for sit to supine. Mod A for supine to sit. Max A for transfers. Min A x1 for stand pivot transfer with RW. Amb 65 ft with bariatric RW and WCF with min A. 6 inch step mod A x2.   RT: Limited participation.   DME: Bariatric RW, WC, drop arm commode  Barriers: New fevers  TDD: 12/13/23 to home.  ----------------------------------------  OUTPATIENT/FOLLOW UP:    Trae Whitney  Pulmonary Disease  [address], [address] [place] [address]  Phone: [phone]  Fax: [phone]  Follow Up Time: 2 weeks    Ryanne Allen  Rheumatology  [address] [address]  Phone: [phone]  Fax: [phone]  Follow Up Time: 1 week    Kyle Pierce  Internal Medicine  [address] [place] [address], [address]  [place] [address]  Phone: [phone]  Fax: [phone]  Follow Up Time: 2 weeks    Addy Powers  Pulmonary Disease  [address] [facility], [address]  [place], [place] [address]  Phone: [phone]  Fax: [phone]  Follow Up Time:     Kwame Hawley  Critical Care Medicine  [address] [facility], [address]  [place] [address]  Phone: [phone]  Fax: [phone]  Follow Up Time:     Neena Borrego  Rheumatology  [address] [facility], [address]  [place] [address]  Phone: [phone]  Fax: [phone]  Follow Up Time:    [facility]  [facility] [address]  Scheduled Appointment: 09/13/2023  2:40 PM     Assessment/Plan:  ALIZA FOLEY is a 62 year old female with PMH of pAFIB and sciatica; who presented to [facility] ED with SOB, and was found to have groundglass opacities and interstitial lung disease. She was treated with empiric Vancomycin and Zosyn. She underwent a bronchoscopy with bronchoalveolar lavage and pulse steroids. She was given IV diuretics for increased pulmonary congestion, but her respiratory status continued to worsen.  On 9/13, she was transferred to [facility] for ECMO requirements. She was further treated with Plasmapheresis, Rituximab and high-dose steroids, with significant improvement. Her overall hospital course was complicated by thrombocytopenia (s/p several platelet transfusions), leukocytosis (infectious workup entirely negative- s/p Vanco and Ceftriaxone), AFIB with RVR (resolved with Metoprolol), dysphagia (requiring NGT), transaminitis (secondary to acute illness and hypotension),  non-occlusive RIJ DVT (started on Lovenox). Patient now admitted for a multidisciplinary rehab program. 10-05-23 @ 13:18    * Sustained functional improvement including progress with Wt bearing LE and improvement with ability at transfers, concentrate on prox LE muscle strengthening  * Making further progress, increased ambulatory distance with walker up to 80 ft  * UTI - on Zosyn - await urine and blood cultures - patient will consider additional IV fluids pending urine and blood culture results   * Repeat BL LE doppler negative  * Cankre sore- Lidocaine swish and spit TID x3 days     #Interstitial Lung Disease and Mixed connective tissue disease  - Gait Instability, ADL impairments and Functional impairments: start Comprehensive Rehab Program of PT/OT/SLP - 3 hours a day, 5 days a week  - P&O as needed   - Non-specific Interstitial Lung Disease  - Requiring ECMO   - Improvement s/p Plasmapheresis, Rituximab and high- dose steroids   - Albuterol/Ipratropium Nebulizer every 6 hours  - Mepron 1500mg daily  - PO Medrol ( due to liver dysfunction): Plan will be to taper by ~20% every 2 weeks    Medrol   64mg x 2 weeks   56mg x 2 weeks  44mg x 2 weeks   36mg x 2 weeks - Follow up Outpatient   - Plan to wean 02 as tolerated, Continue tapering oxygen,  -  CT Chest noted 11/10---Resp f/u review  11/14, appreciated  They reports sustained improvement, clinical (normal oxy sats on R/A, sustained progress with oxy tapering), and radiological (no acute findings on recent CT Chest, rather residual chronic infiltrative diesease noted, with recommendation to repeat CT after Acute rehab treatment     #Fever  - Recent UTI on Bactrim (since DCd)  - UCX with enterococcus faecalis- await sensitivities   - 101.4 fever overnight (12/4-12/5)  - Lactate of 2.5- patient refused IV fluids   - Start Zosyn IVPB 12/6 (pt agreeable)  - RVP negative  - Await blood cultures, CXR  - ID consult and Pulm re-eval (no respiratory symptoms)  - Pulm recs: continue to monitor   - ID recs: CT chest- stable (12/5)   - Repeat Lactate of 3.1 - agreeable to IV fluids- s/p 1000mL bolus   - BL LE doppler- negative     #Cankre sore  - Lidocaine swish and spit TID x3 days     #Knee buckling  - 12/1: S/p guided fall with physical therapy  - Back muscle strain with catching self on RW   - Lidocaine patch to back and BL deltoids  - Ibuprofen 400mg x1 dose     #Experiencing R mandible and occipital numbness, with associated hair loss- outpatient follow up with Rheumatology    #Posttnasal drip--ENT review 11/7, declined scope, continue flonase     #pAFIB/Rt Ij thrombus  - Metoprolol 25mg BID and lovenox  --- Eliquis 5mg BID - tolerating well     #Chronic Tinnitus   - ENT review and recs appreciate, Patient already made ENT appointment for f/u    #Lymphedema both legs -chronic and Rt IJ thrombus  - ACE Wrap BL LEs  - BL LE doppler negative for DVT  - BL UE doppler with chronic thrombotic changes in RIJ     #Transaminitis --LFT Down trending   - Secondary to acute illness and hypotension at [facility]  - Outpatient follow up     #Leucocytosis--steroid related, continue monitoring     #Neuropathy  - Gabapentin 300 mg BID, will consider increasing dose, increase to TID 11/10  --Work on motor strengthening, priority for UE as preferred by patient   - Vit b12 >2,000 in 9/2023  --Will consider Rhuematology f/u if needed    #Sleep/Mood  - Melatonin 6mg at HS  - Psych rec Xanax 0.25mg PRN    #Skin  - Skin: Left lower abdomen ruptured blister 3.0 x 1.0, stage 2 pressure injury to right buttock 4 x1 and left buttock 3x1, stage 2 pressure injury ti right inner thigh 0.2 x 0.2, right groin wound 10.5 x 2.0,   Wound care review, Left groin wound 11/15--- 0.6x1.8.0.1cm, no slough, healthy base  -- MAD to skin folds- Nystatin to submammary and abdominal pannus BID  -- MAD to L groin- cleanse with NS, Triad cream daily  -- Mad to R groin- Nystatin powder daily   -- R groin wound-- aquacel packing, Triad to periwound, Allevyn foam- daily and PRN   - Pressure injury/Skin: OOB to Chair, PT/OT   - Offload pressure, low air loss support surfaces, T&P every 2 hours   --Wound care consult-10/9 -Multiple wounds--perineal and buttock/sacral, recs appreciated   --Suggest Continue treatments as below:   Twice daily & PRN Normal Saline Cleanse of abdominal pannus & breast folds, perineal, Left & Right inner buttock erosions.  Pat thoroughly dry.   Apply Desitin generously twice daily (& PRN) to perineal, buttocks, and left groin erosions, leave open to air.    Apply Nystatin powder to abdominal pannus and breast folds.  Suggest use of Interdry cloths in folds to 'wick' moisture.  Suggest daily Normal Saline Cleanse of right groin surgical wound, pat dry.  Apply Cavillon film barrier to intact periwound skin.  Place Aquacell strip over open area, cover with foam dressing.  - Wound care following     #Pain Mgmt   - Tylenol PRN   - Gabapentin 300mg at HS     #GI/Bowel Mgmt/Hx of alternating bowel movements  - Pantoprazole  - PRN: Maalox   --Lactobacillus BID     #/Bladder Mgmt   -Spontaneously voiding   - UA (11/3) negative  - 12/4: Urinary frequency- UA with moderate Leuks- started on Bactrim (since DCd)  - Await UCx  - Started on Zosyn IVPB     #FEN   #Dysphagia  - Diet - Minced & Moist  [CC]    - Dysphagia- SLP evaluation appreciated     #Health maintenance  - Folic Acid   - MVI  - Ocean nasal spray    # Difficulty with access to peripheral veins-- Blood draws from Left arm Medline     #Precautions / PROPHYLAXIS:   - Falls  - ortho: Weight bearing status: WBAT   - Lungs: Aspiration, Incentive Spirometer   - DVT PPX: Eliquis 5 mg BID   ----------------------------------------  11/28 --Labs CBC mild anemia, normal renal function, LFT elevated, downtrending overall unremarkable   ----------------------------------------  Liaison with other providers/agencies    PEER TO PEER with Dr Tripp (patient's insurance company)  * Peer to Peer completed, insurance approval retrospectively given to cover from 11/14 to 11/20, Insurance co awaits updated notes to determine further approval of coverage   SW team to send updated clinical and functional notes to medical insurance company  ----------------------------------------  IDT conference on 12/5  Social Work: Awaiting insurance response on clinicals  SLP: --   OT: Setup for eating/grooming. Sup for UBD. Min A for LBD. Mod A x2 for shower transfer. CG with SB for toilet transfer. Mod A x1 for toileting/hygiene.  PT: Min A for sit to supine. Mod A for supine to sit. Max A for transfers. Min A x1 for stand pivot transfer with RW. Amb 65 ft with bariatric RW and WCF with min A. 6 inch step mod A x2.   RT: Limited participation.   DME: Bariatric RW, WC, drop arm commode  Barriers: New fevers  TDD: 12/13/23 to home.  ----------------------------------------  OUTPATIENT/FOLLOW UP:    Trae Whitney  Pulmonary Disease  [address], [address] [place] [address]  Phone: [phone]  Fax: [phone]  Follow Up Time: 2 weeks    Ryanne Allen  Rheumatology  [address] [address]  Phone: [phone]  Fax: [phone]  Follow Up Time: 1 week    Kyle Pierce  Internal Medicine  [address] [place] [address], [address]  [place] [address]  Phone: [phone]  Fax: [phone]  Follow Up Time: 2 weeks    Addy Powers  Pulmonary Disease  [address] [facility], [address]  [place], [place] [address]  Phone: [phone]  Fax: [phone]  Follow Up Time:     [facility]ley [facility]  Critical Care Medicine  [address] [facility], [address]  [place] [address]  Phone: [phone]  Fax: [phone]  Follow Up Time:     Neena Borrego  Rheumatology  [address] [facility], Floor 3  [place] [address]  Phone: [phone]  Fax: [phone]  Follow Up Time:    [facility]  [facility] [address]  Scheduled Appointment: 09/13/2023  2:40 PM     Assessment/Plan:  ALIZA FOLEY is a 62 year old female with PMH of pAFIB and sciatica; who presented to [facility] ED with SOB, and was found to have groundglass opacities and interstitial lung disease. She was treated with empiric Vancomycin and Zosyn. She underwent a bronchoscopy with bronchoalveolar lavage and pulse steroids. She was given IV diuretics for increased pulmonary congestion, but her respiratory status continued to worsen.  On 9/13, she was transferred to [facility] for ECMO requirements. She was further treated with Plasmapheresis, Rituximab and high-dose steroids, with significant improvement. Her overall hospital course was complicated by thrombocytopenia (s/p several platelet transfusions), leukocytosis (infectious workup entirely negative- s/p Vanco and Ceftriaxone), AFIB with RVR (resolved with Metoprolol), dysphagia (requiring NGT), transaminitis (secondary to acute illness and hypotension),  non-occlusive RIJ DVT (started on Lovenox). Patient now admitted for a multidisciplinary rehab program. 10-05-23 @ 13:18    * Sustained functional improvement including progress with Wt bearing LE and improvement with ability at transfers, concentrate on prox LE muscle strengthening  * Making further progress, increased ambulatory distance with walker up to 80 ft  * UTI - on Zosyn - await urine and blood cultures - patient will consider additional IV fluids pending urine and blood culture results   * Repeat BL LE doppler negative  * Cankre sore- Lidocaine swish and spit TID x3 days     #Interstitial Lung Disease and Mixed connective tissue disease  - Gait Instability, ADL impairments and Functional impairments: start Comprehensive Rehab Program of PT/OT/SLP - 3 hours a day, 5 days a week  - P&O as needed   - Non-specific Interstitial Lung Disease  - Requiring ECMO   - Improvement s/p Plasmapheresis, Rituximab and high- dose steroids   - Albuterol/Ipratropium Nebulizer every 6 hours  - Mepron 1500mg daily  - PO Medrol ( due to liver dysfunction): Plan will be to taper by ~20% every 2 weeks    Medrol   64mg x 2 weeks   56mg x 2 weeks  44mg x 2 weeks   36mg x 2 weeks - Follow up Outpatient   - Plan to wean 02 as tolerated, Continue tapering oxygen,  -  CT Chest noted 11/10---Resp f/u review  11/14, appreciated  They reports sustained improvement, clinical (normal oxy sats on R/A, sustained progress with oxy tapering), and radiological (no acute findings on recent CT Chest, rather residual chronic infiltrative diesease noted, with recommendation to repeat CT after Acute rehab treatment     #Fever  - Recent UTI on Bactrim (since DCd)  - UCX with enterococcus faecalis- await sensitivities   - 101.4 fever overnight (12/4-12/5)  - Lactate of 2.5- patient refused IV fluids   - Start Zosyn IVPB 12/6 (pt agreeable)  - RVP negative  - Await blood cultures, CXR  - ID consult and Pulm re-eval (no respiratory symptoms)  - Pulm recs: continue to monitor   - ID recs: CT chest- stable (12/5)   - Repeat Lactate of 3.1 - agreeable to IV fluids- s/p 1000mL bolus   - BL LE doppler- negative     #Cankre sore  - Lidocaine swish and spit TID x3 days     #Knee buckling  - 12/1: S/p guided fall with physical therapy  - Back muscle strain with catching self on RW   - Lidocaine patch to back and BL deltoids  - Ibuprofen 400mg x1 dose     #Experiencing R mandible and occipital numbness, with associated hair loss- outpatient follow up with Rheumatology    #Posttnasal drip--ENT review 11/7, declined scope, continue flonase     #pAFIB/Rt Ij thrombus  - Metoprolol 25mg BID and lovenox  --- Eliquis 5mg BID - tolerating well     #Chronic Tinnitus   - ENT review and recs appreciate, Patient already made ENT appointment for f/u    #Lymphedema both legs -chronic and Rt IJ thrombus  - ACE Wrap BL LEs  - BL LE doppler negative for DVT  - BL UE doppler with chronic thrombotic changes in RIJ     #Transaminitis --LFT Down trending   - Secondary to acute illness and hypotension at [facility]  - Outpatient follow up     #Leucocytosis--steroid related, continue monitoring     #Neuropathy  - Gabapentin 300 mg BID, will consider increasing dose, increase to TID 11/10  --Work on motor strengthening, priority for UE as preferred by patient   - Vit b12 >2,000 in 9/2023  --Will consider Rhuematology f/u if needed    #Sleep/Mood  - Melatonin 6mg at HS  - Psych rec Xanax 0.25mg PRN    #Skin  - Skin: Left lower abdomen ruptured blister 3.0 x 1.0, stage 2 pressure injury to right buttock 4 x1 and left buttock 3x1, stage 2 pressure injury ti right inner thigh 0.2 x 0.2, right groin wound 10.5 x 2.0,   Wound care review, Left groin wound 11/15--- 0.6x1.8.0.1cm, no slough, healthy base  -- MAD to skin folds- Nystatin to submammary and abdominal pannus BID  -- MAD to L groin- cleanse with NS, Triad cream daily  -- Mad to R groin- Nystatin powder daily   -- R groin wound-- aquacel packing, Triad to periwound, Allevyn foam- daily and PRN   - Pressure injury/Skin: OOB to Chair, PT/OT   - Offload pressure, low air loss support surfaces, T&P every 2 hours   --Wound care consult-10/9 -Multiple wounds--perineal and buttock/sacral, recs appreciated   --Suggest Continue treatments as below:   Twice daily & PRN Normal Saline Cleanse of abdominal pannus & breast folds, perineal, Left & Right inner buttock erosions.  Pat thoroughly dry.   Apply Desitin generously twice daily (& PRN) to perineal, buttocks, and left groin erosions, leave open to air.    Apply Nystatin powder to abdominal pannus and breast folds.  Suggest use of Interdry cloths in folds to 'wick' moisture.  Suggest daily Normal Saline Cleanse of right groin surgical wound, pat dry.  Apply Cavillon film barrier to intact periwound skin.  Place Aquacell strip over open area, cover with foam dressing.  - Wound care following     #Pain Mgmt   - Tylenol PRN   - Gabapentin 300mg at HS     #GI/Bowel Mgmt/Hx of alternating bowel movements  - Pantoprazole  - PRN: Maalox   --Lactobacillus BID     #/Bladder Mgmt   -Spontaneously voiding   - UA (11/3) negative  - 12/4: Urinary frequency- UA with moderate Leuks- started on Bactrim (since DCd)  - Await UCx  - Started on Zosyn IVPB     #FEN   #Dysphagia  - Diet - Minced & Moist  [CC]    - Dysphagia- SLP evaluation appreciated     #Health maintenance  - Folic Acid   - MVI  - Ocean nasal spray    # Difficulty with access to peripheral veins-- Blood draws from Left arm [facility]     #Precautions / PROPHYLAXIS:   - Falls  - ortho: Weight bearing status: WBAT   - Lungs: Aspiration, Incentive Spirometer   - DVT PPX: Eliquis 5 mg BID   ----------------------------------------  11/28 --Labs CBC mild anemia, normal renal function, LFT elevated, downtrending overall unremarkable   ----------------------------------------  Liaison with other providers/agencies    PEER TO PEER with Dr Tripp (patient's insurance company)  * Peer to Peer completed, insurance approval retrospectively given to cover from 11/14 to 11/20, Insurance co awaits updated notes to determine further approval of coverage   SW team to send updated clinical and functional notes to medical insurance company  ----------------------------------------  IDT conference on 12/5  Social Work: Awaiting insurance response on clinicals  SLP: --   OT: Setup for eating/grooming. Sup for UBD. Min A for LBD. Mod A x2 for shower transfer. CG with SB for toilet transfer. Mod A x1 for toileting/hygiene.  PT: Min A for sit to supine. Mod A for supine to sit. Max A for transfers. Min A x1 for stand pivot transfer with RW. Amb 65 ft with bariatric RW and WCF with min A. 6 inch step mod A x2.   RT: Limited participation.   DME: Bariatric RW, WC, drop arm commode  Barriers: New fevers  TDD: 12/13/23 to home.  ----------------------------------------  OUTPATIENT/FOLLOW UP:    Trae Whitney  Pulmonary Disease  [address], [address] [place] [address]  Phone: [phone]  Fax: [phone]  Follow Up Time: 2 weeks    Ryanne Allen  Rheumatology  [address] [address]  Phone: [phone]  Fax: [phone]  Follow Up Time: 1 week    Kyle Pierce  Internal Medicine  [address] [place] [address], [address]  [place] [address]  Phone: [phone]  Fax: [phone]  Follow Up Time: 2 weeks    Addy Powers  Pulmonary Disease  [address] [facility], [address]  [place], [place] [address]  Phone: [phone]  Fax: [phone]  Follow Up Time:     [facility] Hawley [facility]  Critical Care Medicine  [address] [facility], [address]  [place] [address]  Phone: [phone]  Fax: [phone]  Follow Up Time:     Neena Borrego  Rheumatology  [address] [facility], [address]  [place] [address]  Phone: [phone]  Fax: [phone]  Follow Up Time:    [facility]  [facility] [address]  Scheduled Appointment: 09/13/2023  2:40 PM

## 2023-12-08 LAB
LDH SERPL L TO P-CCNC: 288 U/L — HIGH (ref 50–242)
LDH SERPL L TO P-CCNC: 288 U/L — HIGH (ref 50–242)

## 2023-12-08 PROCEDURE — 99232 SBSQ HOSP IP/OBS MODERATE 35: CPT

## 2023-12-08 PROCEDURE — 99232 SBSQ HOSP IP/OBS MODERATE 35: CPT | Mod: GC

## 2023-12-08 RX ORDER — ALPRAZOLAM 0.25 MG
0.25 TABLET ORAL EVERY 6 HOURS
Refills: 0 | Status: DISCONTINUED | OUTPATIENT
Start: 2023-12-08 | End: 2023-12-14

## 2023-12-08 RX ADMIN — Medication 1 DROP(S): at 21:07

## 2023-12-08 RX ADMIN — Medication 36 MILLIGRAM(S): at 08:24

## 2023-12-08 RX ADMIN — GABAPENTIN 300 MILLIGRAM(S): 400 CAPSULE ORAL at 21:04

## 2023-12-08 RX ADMIN — APIXABAN 5 MILLIGRAM(S): 2.5 TABLET, FILM COATED ORAL at 21:06

## 2023-12-08 RX ADMIN — Medication 1 TABLET(S): at 08:21

## 2023-12-08 RX ADMIN — SODIUM CHLORIDE 5 MILLILITER(S): 9 INJECTION INTRAMUSCULAR; INTRAVENOUS; SUBCUTANEOUS at 12:32

## 2023-12-08 RX ADMIN — ZINC OXIDE 1 APPLICATION(S): 200 OINTMENT TOPICAL at 13:09

## 2023-12-08 RX ADMIN — Medication 500 MILLIGRAM(S): at 21:06

## 2023-12-08 RX ADMIN — NYSTATIN CREAM 1 APPLICATION(S): 100000 CREAM TOPICAL at 08:25

## 2023-12-08 RX ADMIN — Medication 12.5 MILLIGRAM(S): at 21:06

## 2023-12-08 RX ADMIN — APIXABAN 5 MILLIGRAM(S): 2.5 TABLET, FILM COATED ORAL at 08:23

## 2023-12-08 RX ADMIN — Medication 500 MILLIGRAM(S): at 08:21

## 2023-12-08 RX ADMIN — ZINC OXIDE 1 APPLICATION(S): 200 OINTMENT TOPICAL at 08:24

## 2023-12-08 RX ADMIN — Medication 12.5 MILLIGRAM(S): at 08:23

## 2023-12-08 RX ADMIN — Medication 500 MILLIGRAM(S): at 13:09

## 2023-12-08 RX ADMIN — GABAPENTIN 300 MILLIGRAM(S): 400 CAPSULE ORAL at 08:21

## 2023-12-08 RX ADMIN — Medication 500 MILLIGRAM(S): at 21:05

## 2023-12-08 RX ADMIN — Medication 1 TABLET(S): at 21:05

## 2023-12-08 RX ADMIN — Medication 1 MILLIGRAM(S): at 21:06

## 2023-12-08 RX ADMIN — Medication 1 DROP(S): at 08:22

## 2023-12-08 RX ADMIN — ATOVAQUONE 1500 MILLIGRAM(S): 750 SUSPENSION ORAL at 21:05

## 2023-12-08 RX ADMIN — GABAPENTIN 300 MILLIGRAM(S): 400 CAPSULE ORAL at 13:28

## 2023-12-08 RX ADMIN — LIDOCAINE 5 MILLILITER(S): 4 CREAM TOPICAL at 13:09

## 2023-12-08 RX ADMIN — PANTOPRAZOLE SODIUM 40 MILLIGRAM(S): 20 TABLET, DELAYED RELEASE ORAL at 08:23

## 2023-12-08 NOTE — PROGRESS NOTE ADULT - SUBJECTIVE AND OBJECTIVE BOX
CC: f/u for  enterococcal uti  Patient reports  feels good today, no dysuria  REVIEW OF SYSTEMS:  All other review of systems negative (Comprehensive ROS)    Antimicrobials Day #  :4/10  amoxicillin 500 milliGRAM(s) Oral three times a day  atovaquone  Suspension 1500 milliGRAM(s) Oral daily    Other Medications Reviewed    vss per flow  PHYSICAL EXAM:  General: alert, no acute distress  Eyes:  anicteric, no conjunctival injection, no discharge  Oropharynx: no lesions or injection 	  Neck: supple, without adenopathy  Lungs: clear to auscultation  Heart: regular rate and rhythm; no murmur, rubs or gallops  Abdomen: soft, nondistended, nontender, without mass or organomegaly  Skin: no lesions  Extremities: no clubbing, cyanosis, . some  edema  Neurologic: alert, oriented, moves all extremities    LAB RESULTS:              MICROBIOLOGY:  RECENT CULTURES:      RADIOLOGY REVIEWED:    ACC: [id number] EXAM:  CT CHEST   ORDERED BY: MARY URRUTIA     PROCEDURE DATE:  12/05/2023          INTERPRETATION:  CLINICAL INFORMATION: Fever. Evaluate for pneumonia.    COMPARISON: November 10, 2023.    CONTRAST/COMPLICATIONS:  IV Contrast: None  Oral Contrast: None  Complications: Not applicable    PROCEDURE:  CT of the Chest was performed.  Sagittal and coronal reformats were performed.    FINDINGS:    LUNGS AND AIRWAYS: Patent central airways.  There are stable peripherally   distributed reticular opacities identified within the bilateral lung   parenchyma with comparison to examination November 10, 2023, without   evidence for interval development of infiltrate or lobar consolidation.   No significant traction bronchiectasis or honeycombing identified. Band   type opacity within the right lower lobe likely related to platelike   atelectasis/scarring.  PLEURA: No pleural effusion.  MEDIASTINUM AND NIRMALA: No lymphadenopathy.  VESSELS: Within normal limits.  HEART: Heart size is normal. No pericardial effusion.  CHEST WALL AND LOWER NECK: Within normal limits.  VISUALIZED UPPER ABDOMEN: Gallstones. The adrenal glands appear   unremarkable.  BONES: Degenerative changes.    IMPRESSION:  There are stable peripherally distributed reticular   opacities identified within the bilateral lung parenchyma with comparison   to examination November 10, 2023, without evidence for interval   development of infiltrate or lobar consolidation. No significant traction   bronchiectasis or honeycombing identified. Band type opacity within the   right lower lobe likely related to platelike atelectasis/scarring.                    Assessment:  Patient here at rehab for 2 months after a prolonged hospital stay for respiratory failure, dx with mct /ild, had ecmo, rituximab, plasmapharesis, remains on steroids so on atovaquone prophylaxis. She had recent fever, had zosyn then changed to po levaquin for uti. She grew enterococcus form the urine, not bacteremic. It is ok to do amoxicillin po for pyelo, she is feeling well  Plan:  po amoxicllin for another 6 dyas       Plan: CC: f/u for  enterococcal uti  Patient reports  feels good today, no dysuria  REVIEW OF SYSTEMS:  All other review of systems negative (Comprehensive ROS)    Antimicrobials Day #  :4/10  amoxicillin 500 milliGRAM(s) Oral three times a day  atovaquone  Suspension 1500 milliGRAM(s) Oral daily    Other Medications Reviewed    vss per flow  PHYSICAL EXAM:  General: alert, no acute distress  Eyes:  anicteric, no conjunctival injection, no discharge  Oropharynx: no lesions or injection 	  Neck: supple, without adenopathy  Lungs: clear to auscultation  Heart: regular rate and rhythm; no murmur, rubs or gallops  Abdomen: soft, nondistended, nontender, without mass or organomegaly  Skin: no lesions  Extremities: no clubbing, cyanosis, . some  edema  Neurologic: alert, oriented, moves all extremities    LAB RESULTS:              MICROBIOLOGY:  RECENT CULTURES:      RADIOLOGY REVIEWED:    ACC: [id number] EXAM:  CT CHEST   ORDERED BY: MARY URRUTIA     PROCEDURE DATE:  12/05/2023          INTERPRETATION:  CLINICAL INFORMATION: Fever. Evaluate for pneumonia.    COMPARISON: November 10, 2023.    CONTRAST/COMPLICATIONS:  IV Contrast: None  Oral Contrast: None  Complications: Not applicable    PROCEDURE:  CT of the Chest was performed.  Sagittal and coronal reformats were performed.    FINDINGS:    LUNGS AND AIRWAYS: Patent central airways.  There are stable peripherally   distributed reticular opacities identified within the bilateral lung   parenchyma with comparison to examination November 10, 2023, without   evidence for interval development of infiltrate or lobar consolidation.   No significant traction bronchiectasis or honeycombing identified. Band   type opacity within the right lower lobe likely related to platelike   atelectasis/scarring.  PLEURA: No pleural effusion.  MEDIASTINUM AND NIRMALA: No lymphadenopathy.  VESSELS: Within normal limits.  HEART: Heart size is normal. No pericardial effusion.  CHEST WALL AND LOWER NECK: Within normal limits.  VISUALIZED UPPER ABDOMEN: Gallstones. The adrenal glands appear   unremarkable.  BONES: Degenerative changes.    IMPRESSION:  There are stable peripherally distributed reticular   opacities identified within the bilateral lung parenchyma with comparison   to examination November 10, 2023, without evidence for interval   development of infiltrate or lobar consolidation. No significant traction   bronchiectasis or honeycombing identified. Band type opacity within the   right lower lobe likely related to platelike atelectasis/scarring.                    Assessment:  Patient here at rehab for 2 months after a prolonged hospital stay for respiratory failure, dx with mct /ild, had ecmo, rituximab, plasmapharesis, remains on steroids so on atovaquone prophylaxis. She had recent fever, had zosyn then changed to po levaquin for uti. She grew enterococcus form the urine, not bacteremic. It is ok to do amoxicillin po for pyelo, she is feeling well  Plan:  po amoxicllin for another 6 dyas       Plan:

## 2023-12-08 NOTE — PROGRESS NOTE ADULT - ASSESSMENT
Assessment/Plan:  ALIZA FOLEY is a 62 year old female with PMH of pAFIB and sciatica; who presented to [facility] ED with SOB, and was found to have groundglass opacities and interstitial lung disease. She was treated with empiric Vancomycin and Zosyn. She underwent a bronchoscopy with bronchoalveolar lavage and pulse steroids. She was given IV diuretics for increased pulmonary congestion, but her respiratory status continued to worsen.  On 9/13, she was transferred to [facility] for ECMO requirements. She was further treated with Plasmapheresis, Rituximab and high-dose steroids, with significant improvement. Her overall hospital course was complicated by thrombocytopenia (s/p several platelet transfusions), leukocytosis (infectious workup entirely negative- s/p Vanco and Ceftriaxone), AFIB with RVR (resolved with Metoprolol), dysphagia (requiring NGT), transaminitis (secondary to acute illness and hypotension),  non-occlusive RIJ DVT (started on Lovenox). Patient now admitted for a multidisciplinary rehab program. 10-05-23 @ 13:18    * Sustained functional improvement including progress with Wt bearing LE and improvement with ability at transfers, concentrate on prox LE muscle strengthening  * Making further progress, increased ambulatory distance with walker up to 80 ft  * UTI - Amoxicillin TID, per ID   * Cankre sore- Lidocaine swish and spit TID x3 days     #Interstitial Lung Disease and Mixed connective tissue disease  - Gait Instability, ADL impairments and Functional impairments: start Comprehensive Rehab Program of PT/OT/SLP - 3 hours a day, 5 days a week  - P&O as needed   - Non-specific Interstitial Lung Disease  - Requiring ECMO   - Improvement s/p Plasmapheresis, Rituximab and high- dose steroids   - Albuterol/Ipratropium Nebulizer every 6 hours  - Mepron 1500mg daily  - PO Medrol ( due to liver dysfunction): Plan will be to taper by ~20% every 2 weeks    Medrol   64mg x 2 weeks   56mg x 2 weeks  44mg x 2 weeks   36mg x 2 weeks - Follow up Outpatient   - Plan to wean 02 as tolerated, Continue tapering oxygen,  -  CT Chest noted 11/10---Resp f/u review  11/14, appreciated  They reports sustained improvement, clinical (normal oxy sats on R/A, sustained progress with oxy tapering), and radiological (no acute findings on recent CT Chest, rather residual chronic infiltrative diesease noted, with recommendation to repeat CT after Acute rehab treatment     #Fever  - Recent UTI on Bactrim (since DCd)  - UCX with enterococcus faecalis  - 101.4 fever overnight (12/4-12/5)  - Lactate of 2.5- patient refused IV fluids   - Start Zosyn IVPB 12/6 (pt agreeable)  - RVP negative  - Await blood cultures, CXR  - ID consult and Pulm re-eval (no respiratory symptoms)  - Pulm recs: continue to monitor   - ID recs: CT chest- stable (12/5)   - Repeat Lactate of 3.1 - agreeable to IV fluids- s/p 1000mL bolus   - BL LE doppler- negative     #Cankre sore  - Lidocaine swish and spit TID x3 days     #Knee buckling  - 12/1: S/p guided fall with physical therapy  - Back muscle strain with catching self on RW   - Lidocaine patch to back and BL deltoids  - Ibuprofen 400mg x1 dose     #Experiencing R mandible and occipital numbness, with associated hair loss- outpatient follow up with Rheumatology    #Posttnasal drip--ENT review 11/7, declined scope, continue flonase     #pAFIB/Rt Ij thrombus  - Metoprolol 25mg BID and lovenox  --- Eliquis 5mg BID - tolerating well     #Chronic Tinnitus   - ENT review and recs appreciate, Patient already made ENT appointment for f/u    #Lymphedema both legs -chronic and Rt IJ thrombus  - ACE Wrap BL LEs  - BL LE doppler negative for DVT  - BL UE doppler with chronic thrombotic changes in RIJ     #Transaminitis --LFT Down trending   - Secondary to acute illness and hypotension at [facility]  - Outpatient follow up     #Leucocytosis--steroid related, continue monitoring     #Neuropathy  - Gabapentin 300 mg BID, will consider increasing dose, increase to TID 11/10  --Work on motor strengthening, priority for UE as preferred by patient   - Vit b12 >2,000 in 9/2023  --Will consider Rhuematology f/u if needed    #Sleep/Mood  - Melatonin 6mg at HS  - Psych rec Xanax 0.25mg PRN    #Skin  - Skin: Left lower abdomen ruptured blister 3.0 x 1.0, stage 2 pressure injury to right buttock 4 x1 and left buttock 3x1, stage 2 pressure injury ti right inner thigh 0.2 x 0.2, right groin wound 10.5 x 2.0,   Wound care review, Left groin wound 11/15--- 0.6x1.8.0.1cm, no slough, healthy base  -- MAD to skin folds- Nystatin to submammary and abdominal pannus BID  -- MAD to L groin- cleanse with NS, Triad cream daily  -- Mad to R groin- Nystatin powder daily   -- R groin wound-- aquacel packing, Triad to periwound, Allevyn foam- daily and PRN   - Pressure injury/Skin: OOB to Chair, PT/OT   - Offload pressure, low air loss support surfaces, T&P every 2 hours   --Wound care consult-10/9 -Multiple wounds--perineal and buttock/sacral, recs appreciated   --Suggest Continue treatments as below:   Twice daily & PRN Normal Saline Cleanse of abdominal pannus & breast folds, perineal, Left & Right inner buttock erosions.  Pat thoroughly dry.   Apply Desitin generously twice daily (& PRN) to perineal, buttocks, and left groin erosions, leave open to air.    Apply Nystatin powder to abdominal pannus and breast folds.  Suggest use of Interdry cloths in folds to 'wick' moisture.  Suggest daily Normal Saline Cleanse of right groin surgical wound, pat dry.  Apply Cavillon film barrier to intact periwound skin.  Place Aquacell strip over open area, cover with foam dressing.  - Wound care following     #Pain Mgmt   - Tylenol PRN   - Gabapentin 300mg at HS     #GI/Bowel Mgmt/Hx of alternating bowel movements  - Pantoprazole  - PRN: Maalox   --Lactobacillus BID     #/Bladder Mgmt   -Spontaneously voiding   - UA (11/3) negative  - +UTI- Amoxicillin     #FEN   #Dysphagia  - Diet - Minced & Moist  [CC]    - Dysphagia- SLP evaluation appreciated     #Health maintenance  - Folic Acid   - MVI  - Ocean nasal spray    # Difficulty with access to peripheral veins-- Blood draws from Left arm Medline     #Precautions / PROPHYLAXIS:   - Falls  - ortho: Weight bearing status: WBAT   - Lungs: Aspiration, Incentive Spirometer   - DVT PPX: Eliquis 5 mg BID   ----------------------------------------  11/28 --Labs CBC mild anemia, normal renal function, LFT elevated, downtrending overall unremarkable   ----------------------------------------  Liaison with other providers/agencies    PEER TO PEER with Dr Tripp (patient's insurance company)  * Peer to Peer completed, insurance approval retrospectively given to cover from 11/14 to 11/20, Insurance co awaits updated notes to determine further approval of coverage   SW team to send updated clinical and functional notes to medical insurance company  ----------------------------------------  IDT conference on 12/5  Social Work: Awaiting insurance response on clinicals  SLP: --   OT: Setup for eating/grooming. Sup for UBD. Min A for LBD. Mod A x2 for shower transfer. CG with SB for toilet transfer. Mod A x1 for toileting/hygiene.  PT: Min A for sit to supine. Mod A for supine to sit. Max A for transfers. Min A x1 for stand pivot transfer with RW. Amb 65 ft with bariatric RW and WCF with min A. 6 inch step mod A x2.   RT: Limited participation.   DME: Bariatric RW, WC, drop arm commode  Barriers: New fevers  TDD: 12/13/23 to home.  ----------------------------------------  OUTPATIENT/FOLLOW UP:    Trae Whitney  Pulmonary Disease  [address], [address] [place] [address]  Phone: [phone]  Fax: [phone]  Follow Up Time: 2 weeks    Ryanne Allen  Rheumatology  [address] [address]  Phone: [phone]  Fax: [phone]  Follow Up Time: 1 week    Kyle Pierce  Internal Medicine  [facility] [address] [place] [address], [address]  [place] [address]  Phone: [phone]  Fax: [phone]  Follow Up Time: 2 weeks    Addy Powers  Pulmonary Disease  [address] [facility], [facility] [address]  [place], [place] [address]  Phone: [phone]  Fax: [phone]  Follow Up Time:     Kwame Hawley  Critical Care Medicine  [address] [facility], [facility] [address]  [place] [address]  Phone: [phone]  Fax: [phone]  Follow Up Time:     Neena Borrego  Rheumatology  [address] [facility], Floor [address]  [place] [address]  Phone: [phone]  Fax: [phone]  Follow Up Time:    [facility]  [facility] [address]  Scheduled Appointment: 09/13/2023  2:40 PM     Assessment/Plan:  ALIZA FOLEY is a 62 year old female with PMH of pAFIB and sciatica; who presented to [facility] ED with SOB, and was found to have groundglass opacities and interstitial lung disease. She was treated with empiric Vancomycin and Zosyn. She underwent a bronchoscopy with bronchoalveolar lavage and pulse steroids. She was given IV diuretics for increased pulmonary congestion, but her respiratory status continued to worsen.  On 9/13, she was transferred to [facility] for ECMO requirements. She was further treated with Plasmapheresis, Rituximab and high-dose steroids, with significant improvement. Her overall hospital course was complicated by thrombocytopenia (s/p several platelet transfusions), leukocytosis (infectious workup entirely negative- s/p Vanco and Ceftriaxone), AFIB with RVR (resolved with Metoprolol), dysphagia (requiring NGT), transaminitis (secondary to acute illness and hypotension),  non-occlusive RIJ DVT (started on Lovenox). Patient now admitted for a multidisciplinary rehab program. 10-05-23 @ 13:18    * Sustained functional improvement including progress with Wt bearing LE and improvement with ability at transfers, concentrate on prox LE muscle strengthening  * Making further progress, increased ambulatory distance with walker up to 80 ft  * UTI - Amoxicillin TID, per ID   * Cankre sore- Lidocaine swish and spit TID x3 days     #Interstitial Lung Disease and Mixed connective tissue disease  - Gait Instability, ADL impairments and Functional impairments: start Comprehensive Rehab Program of PT/OT/SLP - 3 hours a day, 5 days a week  - P&O as needed   - Non-specific Interstitial Lung Disease  - Requiring ECMO   - Improvement s/p Plasmapheresis, Rituximab and high- dose steroids   - Albuterol/Ipratropium Nebulizer every 6 hours  - Mepron 1500mg daily  - PO Medrol ( due to liver dysfunction): Plan will be to taper by ~20% every 2 weeks    Medrol   64mg x 2 weeks   56mg x 2 weeks  44mg x 2 weeks   36mg x 2 weeks - Follow up Outpatient   - Plan to wean 02 as tolerated, Continue tapering oxygen,  -  CT Chest noted 11/10---Resp f/u review  11/14, appreciated  They reports sustained improvement, clinical (normal oxy sats on R/A, sustained progress with oxy tapering), and radiological (no acute findings on recent CT Chest, rather residual chronic infiltrative diesease noted, with recommendation to repeat CT after Acute rehab treatment     #Fever  - Recent UTI on Bactrim (since DCd)  - UCX with enterococcus faecalis  - 101.4 fever overnight (12/4-12/5)  - Lactate of 2.5- patient refused IV fluids   - Start Zosyn IVPB 12/6 (pt agreeable)  - RVP negative  - Await blood cultures, CXR  - ID consult and Pulm re-eval (no respiratory symptoms)  - Pulm recs: continue to monitor   - ID recs: CT chest- stable (12/5)   - Repeat Lactate of 3.1 - agreeable to IV fluids- s/p 1000mL bolus   - BL LE doppler- negative     #Cankre sore  - Lidocaine swish and spit TID x3 days     #Knee buckling  - 12/1: S/p guided fall with physical therapy  - Back muscle strain with catching self on RW   - Lidocaine patch to back and BL deltoids  - Ibuprofen 400mg x1 dose     #Experiencing R mandible and occipital numbness, with associated hair loss- outpatient follow up with Rheumatology    #Posttnasal drip--ENT review 11/7, declined scope, continue flonase     #pAFIB/Rt Ij thrombus  - Metoprolol 25mg BID and lovenox  --- Eliquis 5mg BID - tolerating well     #Chronic Tinnitus   - ENT review and recs appreciate, Patient already made ENT appointment for f/u    #Lymphedema both legs -chronic and Rt IJ thrombus  - ACE Wrap BL LEs  - BL LE doppler negative for DVT  - BL UE doppler with chronic thrombotic changes in RIJ     #Transaminitis --LFT Down trending   - Secondary to acute illness and hypotension at [facility]  - Outpatient follow up     #Leucocytosis--steroid related, continue monitoring     #Neuropathy  - Gabapentin 300 mg BID, will consider increasing dose, increase to TID 11/10  --Work on motor strengthening, priority for UE as preferred by patient   - Vit b12 >2,000 in 9/2023  --Will consider Rhuematology f/u if needed    #Sleep/Mood  - Melatonin 6mg at HS  - Psych rec Xanax 0.25mg PRN    #Skin  - Skin: Left lower abdomen ruptured blister 3.0 x 1.0, stage 2 pressure injury to right buttock 4 x1 and left buttock 3x1, stage 2 pressure injury ti right inner thigh 0.2 x 0.2, right groin wound 10.5 x 2.0,   Wound care review, Left groin wound 11/15--- 0.6x1.8.0.1cm, no slough, healthy base  -- MAD to skin folds- Nystatin to submammary and abdominal pannus BID  -- MAD to L groin- cleanse with NS, Triad cream daily  -- Mad to R groin- Nystatin powder daily   -- R groin wound-- aquacel packing, Triad to periwound, Allevyn foam- daily and PRN   - Pressure injury/Skin: OOB to Chair, PT/OT   - Offload pressure, low air loss support surfaces, T&P every 2 hours   --Wound care consult-10/9 -Multiple wounds--perineal and buttock/sacral, recs appreciated   --Suggest Continue treatments as below:   Twice daily & PRN Normal Saline Cleanse of abdominal pannus & breast folds, perineal, Left & Right inner buttock erosions.  Pat thoroughly dry.   Apply Desitin generously twice daily (& PRN) to perineal, buttocks, and left groin erosions, leave open to air.    Apply Nystatin powder to abdominal pannus and breast folds.  Suggest use of Interdry cloths in folds to 'wick' moisture.  Suggest daily Normal Saline Cleanse of right groin surgical wound, pat dry.  Apply Cavillon film barrier to intact periwound skin.  Place Aquacell strip over open area, cover with foam dressing.  - Wound care following     #Pain Mgmt   - Tylenol PRN   - Gabapentin 300mg at HS     #GI/Bowel Mgmt/Hx of alternating bowel movements  - Pantoprazole  - PRN: Maalox   --Lactobacillus BID     #/Bladder Mgmt   -Spontaneously voiding   - UA (11/3) negative  - +UTI- Amoxicillin     #FEN   #Dysphagia  - Diet - Minced & Moist  [CC]    - Dysphagia- SLP evaluation appreciated     #Health maintenance  - Folic Acid   - MVI  - Ocean nasal spray    # Difficulty with access to peripheral veins-- Blood draws from Left arm Medline     #Precautions / PROPHYLAXIS:   - Falls  - ortho: Weight bearing status: WBAT   - Lungs: Aspiration, Incentive Spirometer   - DVT PPX: Eliquis 5 mg BID   ----------------------------------------  11/28 --Labs CBC mild anemia, normal renal function, LFT elevated, downtrending overall unremarkable   ----------------------------------------  Liaison with other providers/agencies    PEER TO PEER with Dr Tripp (patient's insurance company)  * Peer to Peer completed, insurance approval retrospectively given to cover from 11/14 to 11/20, Insurance co awaits updated notes to determine further approval of coverage   SW team to send updated clinical and functional notes to medical insurance company  ----------------------------------------  IDT conference on 12/5  Social Work: Awaiting insurance response on clinicals  SLP: --   OT: Setup for eating/grooming. Sup for UBD. Min A for LBD. Mod A x2 for shower transfer. CG with SB for toilet transfer. Mod A x1 for toileting/hygiene.  PT: Min A for sit to supine. Mod A for supine to sit. Max A for transfers. Min A x1 for stand pivot transfer with RW. Amb 65 ft with bariatric RW and WCF with min A. 6 inch step mod A x2.   RT: Limited participation.   DME: Bariatric RW, WC, drop arm commode  Barriers: New fevers  TDD: 12/13/23 to home.  ----------------------------------------  OUTPATIENT/FOLLOW UP:    Trae Whitney  Pulmonary Disease  [address], [address] [place] [address]  Phone: [phone]  Fax: [phone]  Follow Up Time: 2 weeks    Ryanne Allen  Rheumatology  [address] [address]  Phone: [phone]  Fax: [phone]  Follow Up Time: 1 week    Kyle Pierce  Internal Medicine  [facility]7 [address] [place] [address], [address]  [place] [address]  Phone: [phone]  Fax: [phone]  Follow Up Time: 2 weeks    Addy Powers  Pulmonary Disease  [address] [facility], [facility] [address]  [place], [place] [address]  Phone: [phone]  Fax: [phone]  Follow Up Time:     Kwame Hawley  Critical Care Medicine  [address] [facility], [facility] [address]  [place] [address]  Phone: [phone]  Fax: [phone]  Follow Up Time:     Neena Borrego  Rheumatology  [address] [facility], [address]  [place] [address]  Phone: [phone]  Fax: [phone]  Follow Up Time:    [facility]  [facility] [address]  Scheduled Appointment: 09/13/2023  2:40 PM

## 2023-12-08 NOTE — PROGRESS NOTE ADULT - SUBJECTIVE AND OBJECTIVE BOX
62 year old female with PMH of pAFIB and sciatica; who presented to [facility] ED with SOB. She has been experiencing chronic SOB and non-productive cough for several months and was worked up in [place] where she had a CT scan which resulted negative.  She has been evaluated by Pulmonology and Rheumatology and was ruled out for lupus and immunological disorders.  She recently went to Urgent Care due to SOB with ambulating short distances (12-15 feet), and an XR was concerning for BL LL infiltrates. While at [facility],  CXR and CTA were significant for ground glass opacities, and interstitial lung disease, respectively. She was treated with empiric Vancomycin and Zosyn. She underwent a bronchoscopy with bronchoalveolar lavage and pulse steroids. She was given IV diuretics for increased pulmonary congestion, but her respiratory status continued to worsen.  On 9/13, she was transferred to [facility] for ECMO requirements. She was further treated with Plasmapheresis, Rituximab and high-dose steroids, with significant improvement.   Her overall hospital course was complicated by thrombocytopenia (s/p several platelet transfusions), leukocytosis (infectious workup entirely negative- s/p Vanco and Ceftriaxone), AFIB with RVR (resolved with Metoprolol), dysphagia (requiring NGT), transaminitis (secondary to acute illness and hypotension),  non-occlusive RIJ DVT (started on Lovenox). PM&R was consulted and deemed her an appropriate candidate for IRF. She was medically stabilized and cleared for discharge to [facility].     seen at the bedside, c/o feeling tired. no n/v, no sob.  no overnight events, being treated for uti      Vital Signs Last 24 Hrs  T(C): 36.5 (07 Dec 2023 20:57), Max: 36.5 (07 Dec 2023 20:57)  T(F): 97.7 (07 Dec 2023 20:57), Max: 97.7 (07 Dec 2023 20:57)  HR: 78 (07 Dec 2023 20:57) (78 - 78)  BP: 134/77 (07 Dec 2023 20:57) (134/77 - 134/77)  BP(mean): --  RR: 16 (07 Dec 2023 20:57) (16 - 16)  SpO2: 92% (07 Dec 2023 20:57) (92% - 92%)    Parameters below as of 07 Dec 2023 20:57  Patient On (Oxygen Delivery Method): room air          GENERAL- NAD  EAR/NOSE/MOUTH/THROAT -  MMM  EYES- MADAI, conjunctiva and Sclera clear- icterus+ b/l  NECK- supple  RESPIRATORY-  clear to auscultation bilaterally, non laboured breathing  CARDIOVASCULAR - SIS2, RRR  GI - soft NT BS present  EXTREMITIES-  b/l LE edema- improving   NEUROLOGY-  b/l UE AND LE weakness  PSYCHIATRY- AAO X 3          MEDICATIONS  (STANDING):  amoxicillin 500 milliGRAM(s) Oral three times a day  apixaban 5 milliGRAM(s) Oral every 12 hours  artificial  tears Solution 1 Drop(s) Both EYES every 12 hours  ascorbic acid 500 milliGRAM(s) Oral daily  atovaquone  Suspension 1500 milliGRAM(s) Oral daily  folic acid 1 milliGRAM(s) Oral daily  gabapentin 300 milliGRAM(s) Oral three times a day  glucagon  Injectable 1 milliGRAM(s) IntraMuscular once  lactobacillus acidophilus 1 Tablet(s) Oral two times a day with meals  lidocaine   4% Patch 2 Patch Transdermal <User Schedule>  lidocaine   4% Patch 1 Patch Transdermal <User Schedule>  lidocaine 2% Viscous 5 milliLiter(s) Swish and Spit three times a day  melatonin 6 milliGRAM(s) Oral at bedtime  methylPREDNISolone 36 milliGRAM(s) Oral daily  metoprolol tartrate 12.5 milliGRAM(s) Oral two times a day  multivitamin 1 Tablet(s) Oral daily  nystatin Powder 1 Application(s) Topical two times a day  pantoprazole    Tablet 40 milliGRAM(s) Oral before breakfast  polyethylene glycol 3350 17 Gram(s) Oral <User Schedule>  sodium chloride 0.9% Bolus 1000 milliLiter(s) IV Bolus once  sodium chloride 0.9% lock flush 5 milliLiter(s) IV Push two times a day  sodium chloride 0.9%. 1000 milliLiter(s) (100 mL/Hr) IV Continuous <Continuous>  zinc oxide 40% Paste 1 Application(s) Topical three times a day    MEDICATIONS  (PRN):  acetaminophen     Tablet .. 650 milliGRAM(s) Oral every 6 hours PRN Temp greater or equal to 38C (100.4F), Mild Pain (1 - 3), Moderate Pain (4 - 6)  albuterol/ipratropium for Nebulization 3 milliLiter(s) Nebulizer every 6 hours PRN Shortness of Breath and/or Wheezing  ALPRAZolam 0.25 milliGRAM(s) Oral every 6 hours PRN Anxiety  aluminum hydroxide/magnesium hydroxide/simethicone Suspension 30 milliLiter(s) Oral every 4 hours PRN Dyspepsia  ammonium lactate 12% Lotion 1 Application(s) Topical every 12 hours PRN BL feet dryness  baclofen 5 milliGRAM(s) Oral every 8 hours PRN Musculoskeletal Pain  benzocaine/menthol Lozenge 1 Lozenge Oral two times a day PRN Sore Throat  benzonatate 100 milliGRAM(s) Oral three times a day PRN Cough  bisacodyl 5 milliGRAM(s) Oral daily PRN Constipation  dextrose Oral Gel 15 Gram(s) Oral once PRN Blood Glucose LESS THAN 70 milliGRAM(s)/deciliter  hydrocortisone hemorrhoidal Suppository 1 Suppository(s) Rectal two times a day PRN Hemorrhoids  loperamide 2 milliGRAM(s) Oral three times a day PRN Diarrhea  SIMETHICONE SOFTGELS 250 milliGRAM(s),SIMETHICONE SOFTGELS 250 milliGRAM(s) 250 milliGRAM(s) Oral three times a day PRN for gas  sodium chloride 0.65% Nasal 1 Spray(s) Both Nostrils every 8 hours PRN Nasal Congestion   62 year old female with PMH of pAFIB and sciatica; who presented to [facility] ED with SOB. She has been experiencing chronic SOB and non-productive cough for several months and was worked up in [place] where she had a CT scan which resulted negative.  She has been evaluated by Pulmonology and Rheumatology and was ruled out for lupus and immunological disorders.  She recently went to Urgent Care due to SOB with ambulating short distances (12-15 feet), and an XR was concerning for BL LL infiltrates. While at [facility],  CXR and CTA were significant for ground glass opacities, and interstitial lung disease, respectively. She was treated with empiric Vancomycin and Zosyn. She underwent a bronchoscopy with bronchoalveolar lavage and pulse steroids. She was given IV diuretics for increased pulmonary congestion, but her respiratory status continued to worsen.  On 9/13, she was transferred to [facility] for ECMO requirements. She was further treated with Plasmapheresis, Rituximab and high-dose steroids, with significant improvement.   Her overall hospital course was complicated by thrombocytopenia (s/p several platelet transfusions), leukocytosis (infectious workup entirely negative- s/p Vanco and Ceftriaxone), AFIB with RVR (resolved with Metoprolol), dysphagia (requiring NGT), transaminitis (secondary to acute illness and hypotension),  non-occlusive RIJ DVT (started on Lovenox). PM&R was consulted and deemed her an appropriate candidate for IRF. She was medically stabilized and cleared for discharge to [facility].     seen at the bedside, c/o feeling tired. no n/v, no sob.  no overnight events, being treated for uti      Vital Signs Last 24 Hrs  T(C): 36.5 (07 Dec 2023 20:57), Max: 36.5 (07 Dec 2023 20:57)  T(F): 97.7 (07 Dec 2023 20:57), Max: 97.7 (07 Dec 2023 20:57)  HR: 78 (07 Dec 2023 20:57) (78 - 78)  BP: 134/77 (07 Dec 2023 20:57) (134/77 - 134/77)  BP(mean): --  RR: 16 (07 Dec 2023 20:57) (16 - 16)  SpO2: 92% (07 Dec 2023 20:57) (92% - 92%)    Parameters below as of 07 Dec 2023 20:57  Patient On (Oxygen Delivery Method): room air          GENERAL- NAD  EAR/NOSE/MOUTH/THROAT -  MMM  EYES- MADAI, conjunctiva and Sclera clear- icterus+ b/l  NECK- supple  RESPIRATORY-  clear to auscultation bilaterally, non laboured breathing  CARDIOVASCULAR - SIS2, RRR  GI - soft NT BS present  EXTREMITIES-  b/l LE edema- improving   NEUROLOGY-  b/l UE AND LE weakness  PSYCHIATRY- AAO X 3          MEDICATIONS  (STANDING):  amoxicillin 500 milliGRAM(s) Oral three times a day  apixaban 5 milliGRAM(s) Oral every 12 hours  artificial  tears Solution 1 Drop(s) Both EYES every 12 hours  ascorbic acid 500 milliGRAM(s) Oral daily  atovaquone  Suspension 1500 milliGRAM(s) Oral daily  folic acid 1 milliGRAM(s) Oral daily  gabapentin 300 milliGRAM(s) Oral three times a day  glucagon  Injectable 1 milliGRAM(s) IntraMuscular once  lactobacillus acidophilus 1 Tablet(s) Oral two times a day with meals  lidocaine   4% Patch 2 Patch Transdermal <User Schedule>  lidocaine   4% Patch 1 Patch Transdermal <User Schedule>  lidocaine 2% Viscous 5 milliLiter(s) Swish and Spit three times a day  melatonin 6 milliGRAM(s) Oral at bedtime  methylPREDNISolone 36 milliGRAM(s) Oral daily  metoprolol tartrate 12.5 milliGRAM(s) Oral two times a day  multivitamin 1 Tablet(s) Oral daily  nystatin Powder 1 Application(s) Topical two times a day  pantoprazole    Tablet 40 milliGRAM(s) Oral before breakfast  polyethylene glycol 3350 17 Gram(s) Oral <User Schedule>  sodium chloride 0.9% Bolus 1000 milliLiter(s) IV Bolus once  sodium chloride 0.9% lock flush 5 milliLiter(s) IV Push two times a day  sodium chloride 0.9%. 1000 milliLiter(s) (100 mL/Hr) IV Continuous <Continuous>  zinc oxide 40% Paste 1 Application(s) Topical three times a day    MEDICATIONS  (PRN):  acetaminophen     Tablet .. 650 milliGRAM(s) Oral every 6 hours PRN Temp greater or equal to 38C (100.4F), Mild Pain (1 - 3), Moderate Pain (4 - 6)  albuterol/ipratropium for Nebulization 3 milliLiter(s) Nebulizer every 6 hours PRN Shortness of Breath and/or Wheezing  ALPRAZolam 0.25 milliGRAM(s) Oral every 6 hours PRN Anxiety  aluminum hydroxide/magnesium hydroxide/simethicone Suspension 30 milliLiter(s) Oral every 4 hours PRN Dyspepsia  ammonium lactate 12% Lotion 1 Application(s) Topical every 12 hours PRN BL feet dryness  baclofen 5 milliGRAM(s) Oral every 8 hours PRN Musculoskeletal Pain  benzocaine/menthol Lozenge 1 Lozenge Oral two times a day PRN Sore Throat  benzonatate 100 milliGRAM(s) Oral three times a day PRN Cough  bisacodyl 5 milliGRAM(s) Oral daily PRN Constipation  dextrose Oral Gel 15 Gram(s) Oral once PRN Blood Glucose LESS THAN 70 milliGRAM(s)/deciliter  hydrocortisone hemorrhoidal Suppository 1 Suppository(s) Rectal two times a day PRN Hemorrhoids  loperamide 2 milliGRAM(s) Oral three times a day PRN Diarrhea  SIMETHICONE SOFTGELS 250 milliGRAM(s),SIMETHICONE SOFTGELS 250 milliGRAM(s) 250 milliGRAM(s) Oral three times a day PRN for gas  sodium chloride 0.65% Nasal 1 Spray(s) Both Nostrils every 8 hours PRN Nasal Congestion

## 2023-12-08 NOTE — PROGRESS NOTE ADULT - SUBJECTIVE AND OBJECTIVE BOX
SUBJECTIVE/ROS:  Patient seen and examined at bedside this AM. Partner present.  Admits to sleeping well overnight.  Cankre sores present.   LBM 12/6, per patient., voiding without issues      ROS--No chest or abd pain, no palpitations nausea or vomiting    Therapy.  Min A for sit to supine transfers (improved from Mod A x2 persons perviously)  Ambulating 65ft with bariatric RW (improved from 15 ft previously)  Supervision for upper body dressing  Sustained improvement with slide board transfer bed to   Sustained improvement of upper extremity function and strength      Vital Signs Last 24 Hrs  T(C): 36.8 (08 Dec 2023 08:51), Max: 36.8 (08 Dec 2023 08:51)  T(F): 98.3 (08 Dec 2023 08:51), Max: 98.3 (08 Dec 2023 08:51)  HR: 70 (08 Dec 2023 08:51) (70 - 78)  BP: 122/76 (08 Dec 2023 08:51) (122/76 - 134/77)  BP(mean): --  RR: 16 (08 Dec 2023 08:51) (16 - 16)  SpO2: 94% (08 Dec 2023 08:51) (92% - 94%)      PHYSICAL EXAM:  Gen -  Comfortable,  at bedside -normal oxy sat and subsequently self ambulating   Pulm - clear  Cardiovascular - HS i and II intermittently irregular  Abdomen - Soft, non tender,   Extremities - edema to b/l LE, no calf tenderness, LUE Med line    Neuro-  Cognitive - awake, alert, fully oriented,  Light tough sensation diffusely reduced on fingers and dorsal foot, and over right lower jaw, localized area approx 2 cm, no skin changes noted, non progressive   Motor -                    LEFT    UE - 4/5                    RIGHT UE - 4/5                     LEFT    LE - 3+/5, distally and 3/5 proximal                    RIGHT LE - Ankles PF 3/5 ,DF 2/5, Knee ext 3/5 Hips limited by pain Rt hip due to ulcer at ECHO access site   Sensory - decreased light tough sensation fingers and sole of foot, difusely  MSK: improvement with motor strength  Psychiatric - Mood stable, Affect WNL  Skin -- , stage 2 pressure injury to right buttock 4 x1 and left buttock 3x1, stage 2 pressure injury ti right inner thigh 0.2 x 0.2, right groin wound 10.5 x 2.0, Left groin wound 0.6x1.8.0.1cm  Mild discomfort on pressure over Rt inguinal ligament, but no swelling or erythema  MMT--Able to contract B/L upper back muscles, EF/EE 4/5 distally, knee Est 3+/5    LABS:                        9.8    8.23  )-----------( 230      ( 05 Dec 2023 11:00 )             31.0     12-05    133<L>  |  98  |  21  ----------------------------<  151<H>  3.5   |  25  |  0.81    Ca    9.0      05 Dec 2023 11:00    TPro  5.1<L>  /  Alb  2.7<L>  /  TBili  0.9  /  DBili  x   /  AST  20  /  ALT  47<H>  /  AlkPhos  127<H>  12-05        MEDICATIONS  (STANDING):  amoxicillin 500 milliGRAM(s) Oral three times a day  apixaban 5 milliGRAM(s) Oral every 12 hours  artificial  tears Solution 1 Drop(s) Both EYES every 12 hours  ascorbic acid 500 milliGRAM(s) Oral daily  atovaquone  Suspension 1500 milliGRAM(s) Oral daily  dextrose 5%. 1000 milliLiter(s) (100 mL/Hr) IV Continuous <Continuous>  dextrose 5%. 1000 milliLiter(s) (50 mL/Hr) IV Continuous <Continuous>  dextrose 50% Injectable 12.5 Gram(s) IV Push once  dextrose 50% Injectable 25 Gram(s) IV Push once  dextrose 50% Injectable 25 Gram(s) IV Push once  folic acid 1 milliGRAM(s) Oral daily  gabapentin 300 milliGRAM(s) Oral three times a day  glucagon  Injectable 1 milliGRAM(s) IntraMuscular once  lactobacillus acidophilus 1 Tablet(s) Oral two times a day with meals  lidocaine   4% Patch 2 Patch Transdermal <User Schedule>  lidocaine   4% Patch 1 Patch Transdermal <User Schedule>  lidocaine 2% Viscous 5 milliLiter(s) Swish and Spit three times a day  melatonin 6 milliGRAM(s) Oral at bedtime  methylPREDNISolone 36 milliGRAM(s) Oral daily  metoprolol tartrate 12.5 milliGRAM(s) Oral two times a day  multivitamin 1 Tablet(s) Oral daily  nystatin Powder 1 Application(s) Topical two times a day  pantoprazole    Tablet 40 milliGRAM(s) Oral before breakfast  polyethylene glycol 3350 17 Gram(s) Oral <User Schedule>  sodium chloride 0.9% Bolus 1000 milliLiter(s) IV Bolus once  sodium chloride 0.9% lock flush 5 milliLiter(s) IV Push two times a day  sodium chloride 0.9%. 1000 milliLiter(s) (100 mL/Hr) IV Continuous <Continuous>  zinc oxide 40% Paste 1 Application(s) Topical three times a day    MEDICATIONS  (PRN):  acetaminophen     Tablet .. 650 milliGRAM(s) Oral every 6 hours PRN Temp greater or equal to 38C (100.4F), Mild Pain (1 - 3), Moderate Pain (4 - 6)  albuterol/ipratropium for Nebulization 3 milliLiter(s) Nebulizer every 6 hours PRN Shortness of Breath and/or Wheezing  ALPRAZolam 0.25 milliGRAM(s) Oral every 6 hours PRN Anxiety  aluminum hydroxide/magnesium hydroxide/simethicone Suspension 30 milliLiter(s) Oral every 4 hours PRN Dyspepsia  ammonium lactate 12% Lotion 1 Application(s) Topical every 12 hours PRN BL feet dryness  baclofen 5 milliGRAM(s) Oral every 8 hours PRN Musculoskeletal Pain  benzocaine/menthol Lozenge 1 Lozenge Oral two times a day PRN Sore Throat  benzonatate 100 milliGRAM(s) Oral three times a day PRN Cough  bisacodyl 5 milliGRAM(s) Oral daily PRN Constipation  dextrose Oral Gel 15 Gram(s) Oral once PRN Blood Glucose LESS THAN 70 milliGRAM(s)/deciliter  hydrocortisone hemorrhoidal Suppository 1 Suppository(s) Rectal two times a day PRN Hemorrhoids  loperamide 2 milliGRAM(s) Oral three times a day PRN Diarrhea  SIMETHICONE SOFTGELS 250 milliGRAM(s),SIMETHICONE SOFTGELS 250 milliGRAM(s) 250 milliGRAM(s) Oral three times a day PRN for gas  sodium chloride 0.65% Nasal 1 Spray(s) Both Nostrils every 8 hours PRN Nasal Congestion      SUBJECTIVE/ROS:  Patient seen and examined at bedside this AM. Partner present.  Admits to sleeping well overnight.  Cankre sores present.   LBM 12/6, per patient., voiding without issues    ROS--No chest or abd pain, no palpitations nausea or vomiting    Therapy.  Min A for sit to supine transfers (improved from Mod A x2 persons perviously)  Ambulating 65ft with bariatric RW (improved from 15 ft previously)  Supervision for upper body dressing  Sustained improvement with slide board transfer bed to   Sustained improvement of upper extremity function and strength    Vital Signs Last 24 Hrs  T(C): 36.8 (08 Dec 2023 08:51), Max: 36.8 (08 Dec 2023 08:51)  T(F): 98.3 (08 Dec 2023 08:51), Max: 98.3 (08 Dec 2023 08:51)  HR: 70 (08 Dec 2023 08:51) (70 - 78)  BP: 122/76 (08 Dec 2023 08:51) (122/76 - 134/77)  RR: 16 (08 Dec 2023 08:51) (16 - 16)  SpO2: 94% (08 Dec 2023 08:51) (92% - 94%)    PHYSICAL EXAM:  Gen -  Comfortable,  at bedside -normal oxy sat and subsequently self ambulating   Pulm - clear  Cardiovascular - HS i and II intermittently irregular  Abdomen - Soft, non tender,   Extremities - edema to b/l LE, no calf tenderness, LUE Med line    Neuro-  Cognitive - awake, alert, fully oriented,  Light tough sensation diffusely reduced on fingers and dorsal foot, and over right lower jaw, localized area approx 2 cm, no skin changes noted, non progressive   Motor -                    LEFT    UE - 4/5                    RIGHT UE - 4/5                     LEFT    LE - 3+/5, distally and 3/5 proximal                    RIGHT LE - Ankles PF 3/5 ,DF 2/5, Knee ext 3/5 Hips limited by pain Rt hip due to ulcer at ECHO access site   Sensory - decreased light tough sensation fingers and sole of foot, difusely  MSK: improvement with motor strength  Psychiatric - Mood stable, Affect WNL  Skin -- , stage 2 pressure injury to right buttock 4 x1 and left buttock 3x1, stage 2 pressure injury ti right inner thigh 0.2 x 0.2, right groin wound 10.5 x 2.0, Left groin wound 0.6x1.8.0.1cm  Mild discomfort on pressure over Rt inguinal ligament, but no swelling or erythema  MMT--Able to contract B/L upper back muscles, EF/EE 4/5 distally, knee Est 3+/5    LABS:                        9.8    8.23  )-----------( 230      ( 05 Dec 2023 11:00 )             31.0     12-05    133<L>  |  98  |  21  ----------------------------<  151<H>  3.5   |  25  |  0.81    Ca    9.0      05 Dec 2023 11:00    TPro  5.1<L>  /  Alb  2.7<L>  /  TBili  0.9  /  DBili  x   /  AST  20  /  ALT  47<H>  /  AlkPhos  127<H>  12-05      MEDICATIONS  (STANDING):  amoxicillin 500 milliGRAM(s) Oral three times a day  apixaban 5 milliGRAM(s) Oral every 12 hours  artificial  tears Solution 1 Drop(s) Both EYES every 12 hours  ascorbic acid 500 milliGRAM(s) Oral daily  atovaquone  Suspension 1500 milliGRAM(s) Oral daily  dextrose 5%. 1000 milliLiter(s) (100 mL/Hr) IV Continuous <Continuous>  dextrose 5%. 1000 milliLiter(s) (50 mL/Hr) IV Continuous <Continuous>  dextrose 50% Injectable 12.5 Gram(s) IV Push once  dextrose 50% Injectable 25 Gram(s) IV Push once  dextrose 50% Injectable 25 Gram(s) IV Push once  folic acid 1 milliGRAM(s) Oral daily  gabapentin 300 milliGRAM(s) Oral three times a day  glucagon  Injectable 1 milliGRAM(s) IntraMuscular once  lactobacillus acidophilus 1 Tablet(s) Oral two times a day with meals  lidocaine   4% Patch 2 Patch Transdermal <User Schedule>  lidocaine   4% Patch 1 Patch Transdermal <User Schedule>  lidocaine 2% Viscous 5 milliLiter(s) Swish and Spit three times a day  melatonin 6 milliGRAM(s) Oral at bedtime  methylPREDNISolone 36 milliGRAM(s) Oral daily  metoprolol tartrate 12.5 milliGRAM(s) Oral two times a day  multivitamin 1 Tablet(s) Oral daily  nystatin Powder 1 Application(s) Topical two times a day  pantoprazole    Tablet 40 milliGRAM(s) Oral before breakfast  polyethylene glycol 3350 17 Gram(s) Oral <User Schedule>  sodium chloride 0.9% Bolus 1000 milliLiter(s) IV Bolus once  sodium chloride 0.9% lock flush 5 milliLiter(s) IV Push two times a day  sodium chloride 0.9%. 1000 milliLiter(s) (100 mL/Hr) IV Continuous <Continuous>  zinc oxide 40% Paste 1 Application(s) Topical three times a day    MEDICATIONS  (PRN):  acetaminophen     Tablet .. 650 milliGRAM(s) Oral every 6 hours PRN Temp greater or equal to 38C (100.4F), Mild Pain (1 - 3), Moderate Pain (4 - 6)  albuterol/ipratropium for Nebulization 3 milliLiter(s) Nebulizer every 6 hours PRN Shortness of Breath and/or Wheezing  ALPRAZolam 0.25 milliGRAM(s) Oral every 6 hours PRN Anxiety  aluminum hydroxide/magnesium hydroxide/simethicone Suspension 30 milliLiter(s) Oral every 4 hours PRN Dyspepsia  ammonium lactate 12% Lotion 1 Application(s) Topical every 12 hours PRN BL feet dryness  baclofen 5 milliGRAM(s) Oral every 8 hours PRN Musculoskeletal Pain  benzocaine/menthol Lozenge 1 Lozenge Oral two times a day PRN Sore Throat  benzonatate 100 milliGRAM(s) Oral three times a day PRN Cough  bisacodyl 5 milliGRAM(s) Oral daily PRN Constipation  dextrose Oral Gel 15 Gram(s) Oral once PRN Blood Glucose LESS THAN 70 milliGRAM(s)/deciliter  hydrocortisone hemorrhoidal Suppository 1 Suppository(s) Rectal two times a day PRN Hemorrhoids  loperamide 2 milliGRAM(s) Oral three times a day PRN Diarrhea  SIMETHICONE SOFTGELS 250 milliGRAM(s),SIMETHICONE SOFTGELS 250 milliGRAM(s) 250 milliGRAM(s) Oral three times a day PRN for gas  sodium chloride 0.65% Nasal 1 Spray(s) Both Nostrils every 8 hours PRN Nasal Congestion

## 2023-12-08 NOTE — PROGRESS NOTE ADULT - NSPROGADDITIONALINFOA_GEN_ALL_CORE
Please return here or go to the Emergency Department for any concerns or worsening of condition.     Use prescribed topical cream as directed     Practices aimed at minimizing moisture and friction in the involved area and reducing susceptibility to intertrigo are the mainstays of treatment. Typical beneficial practices include:  ?Daily cleansing of intertriginous skin with a mild cleanser followed by drying of affected area with a hair dryer on a cool setting  ?Aeration of affected area when feasible  ?Daily application of drying powders  ?Use of absorbent material or clothing, such as cotton or rucker wool, to separate skin in folds      Candida Skin Infection (Adult)  Candida is type of yeast. It grows naturally on the skin and in the mouth. If it grows out of control, it can cause an infection. Candida can cause infections in the genital area, skin folds, in the mouth, and under the breasts. Anyone can get this infection. It is more common in a person with a weak immune system, such as from diabetes, HIV, or cancer. Its also more common in someone who has been on antibiotic therapy. And its more common people who are overweight or who have incontinence. Wearing tight-fitting clothing and taking part in activities with lots of skin-to-skin contact can also put you at risk.  Candida causes the skin to become bright red and inflamed. The border of the infected part of the skin is often raised. The infection causes pain and itching. Sometimes the skin peels and bleeds. In the mouth, candida is called thrush, and may cause white thickened areas.  A Candida rash is most often treated with an antifungal cream or ointment. The rash will clear a few days after starting the medicine. Infections that dont go away may need a prescription medicine. In rare cases, a bacterial infection can also occur.  Home care  Your healthcare provider will recommend an antifungal cream or ointment for the rash. He or she may also  prescribe a medicine for the itch. Follow all instructions for using these medicines. Dont use cornstarch powder. Cornstarch can cause the Candida infection to get worse.  General care:  · Keep your skin clean by washing the area twice a day.  · Use the cream as directed until your rash is gone. Once the skin has healed, keep it dry to prevent another infection.   · If you are overweight, talk with your healthcare provider about a plan to lose excess weight.  · Avoid clothes that fit tightly.  Follow-up care  Follow up with your healthcare provider, or as advised. Your rash will clear in 7 to 14 days. Call your healthcare provider if the rash is not gone after 14 days.  When to seek medical advice  Call your healthcare provider right away if any of these occur:  · Pain or redness that gets worse or spreads  · Fluid coming from the skin  · Yellow crusts on the skin  · Fever of 100.4°F (38°C) or higher, or as directed by your healthcare provider  Date Last Reviewed: 9/1/2016  © 8260-3147 [employer] [address] [place] [address], [place] [address]. All rights reserved. This information is not intended as a substitute for professional medical care. Always follow your healthcare professional's instructions.         Spent 36 minutes on face-face visit, evaluate, examine and treat

## 2023-12-09 PROCEDURE — 99232 SBSQ HOSP IP/OBS MODERATE 35: CPT

## 2023-12-09 RX ADMIN — LIDOCAINE 2 PATCH: 4 CREAM TOPICAL at 08:31

## 2023-12-09 RX ADMIN — PANTOPRAZOLE SODIUM 40 MILLIGRAM(S): 20 TABLET, DELAYED RELEASE ORAL at 08:30

## 2023-12-09 RX ADMIN — Medication 36 MILLIGRAM(S): at 08:30

## 2023-12-09 RX ADMIN — Medication 1 TABLET(S): at 20:42

## 2023-12-09 RX ADMIN — Medication 12.5 MILLIGRAM(S): at 20:41

## 2023-12-09 RX ADMIN — Medication 12.5 MILLIGRAM(S): at 08:29

## 2023-12-09 RX ADMIN — Medication 1 MILLIGRAM(S): at 20:42

## 2023-12-09 RX ADMIN — GABAPENTIN 300 MILLIGRAM(S): 400 CAPSULE ORAL at 14:14

## 2023-12-09 RX ADMIN — ZINC OXIDE 1 APPLICATION(S): 200 OINTMENT TOPICAL at 20:57

## 2023-12-09 RX ADMIN — APIXABAN 5 MILLIGRAM(S): 2.5 TABLET, FILM COATED ORAL at 08:31

## 2023-12-09 RX ADMIN — LIDOCAINE 1 PATCH: 4 CREAM TOPICAL at 20:31

## 2023-12-09 RX ADMIN — APIXABAN 5 MILLIGRAM(S): 2.5 TABLET, FILM COATED ORAL at 20:42

## 2023-12-09 RX ADMIN — SODIUM CHLORIDE 5 MILLILITER(S): 9 INJECTION INTRAMUSCULAR; INTRAVENOUS; SUBCUTANEOUS at 20:57

## 2023-12-09 RX ADMIN — NYSTATIN CREAM 1 APPLICATION(S): 100000 CREAM TOPICAL at 20:43

## 2023-12-09 RX ADMIN — POLYETHYLENE GLYCOL 3350 17 GRAM(S): 17 POWDER, FOR SOLUTION ORAL at 14:18

## 2023-12-09 RX ADMIN — LIDOCAINE 1 PATCH: 4 CREAM TOPICAL at 19:32

## 2023-12-09 RX ADMIN — Medication 1 TABLET(S): at 08:29

## 2023-12-09 RX ADMIN — LIDOCAINE 5 MILLILITER(S): 4 CREAM TOPICAL at 08:30

## 2023-12-09 RX ADMIN — GABAPENTIN 300 MILLIGRAM(S): 400 CAPSULE ORAL at 20:42

## 2023-12-09 RX ADMIN — Medication 1 DROP(S): at 20:41

## 2023-12-09 RX ADMIN — LIDOCAINE 1 PATCH: 4 CREAM TOPICAL at 08:32

## 2023-12-09 RX ADMIN — LIDOCAINE 2 PATCH: 4 CREAM TOPICAL at 19:31

## 2023-12-09 RX ADMIN — ATOVAQUONE 1500 MILLIGRAM(S): 750 SUSPENSION ORAL at 20:41

## 2023-12-09 RX ADMIN — GABAPENTIN 300 MILLIGRAM(S): 400 CAPSULE ORAL at 08:31

## 2023-12-09 RX ADMIN — LIDOCAINE 2 PATCH: 4 CREAM TOPICAL at 20:31

## 2023-12-09 RX ADMIN — Medication 500 MILLIGRAM(S): at 20:42

## 2023-12-09 RX ADMIN — Medication 500 MILLIGRAM(S): at 14:14

## 2023-12-09 RX ADMIN — Medication 500 MILLIGRAM(S): at 08:31

## 2023-12-09 NOTE — PROGRESS NOTE ADULT - ASSESSMENT
Physical Examination:  GENERAL:                Alert, Oriented, No acute distress.     PULM:                     Bilateral air entry,  poor air entry at bases No Rales, No Rhonchi, No Wheezing  CVS:                         S1, S2,  No Murmur   EXT:                        Trace non pitting edema, nontender, No Cyanosis or Clubbing   NEURO:                  Alert, oriented, interactive, bilateral upper and lower extremity weakness   PSYC:                      Calm, + Insight.      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 with acute hypoxemic respiratory failure that initially responded to steroids, then with worsening after taper with development of acute hypoxemic and hypercapnic respiratory failure requiring intubation and VV- ECMO on 9/13, then transferred to [facility], concerning for DAH vs ILD flare, decannulated from VV-ECMO 9/21, extubated 9/22. S/p pulse steroids, PLEX (9/15, 9/18, 9/20) and Rituximab (9/16 & 10/3). Course c/b severe leukopenia s/p filgastrim, shock liver with slow to resolved hyperbilirubinemia, RIJ DVT, oropharyngeal dysphagia, and recurrent SVT. Repeat CT chest on 9/30 with markedly improved bilateral groundglass opacities with resolved lung consolidations. Now in acute rehab on 4 lpm NC oxygen and tapering dose of medrol.     Problem List:  1. Interstitial lung disease - due to   2. Mixed connective tissue disease   3. Acute hypoxia - Resolved  4. RIJ DVT     Plan:  Antibiotics as per primary team for UTI  Continue PCP ppx with atovaquone   f/u lactic acid, patient does not want IVF.     - Cont. Methylprednisolone PO, taper as per rheumatology recs. from [facility]   - Continue atovaquone for PCP prophylaxis  - S/p Rituximab 9/16 and 10/3  - S/p PLEX during MICU stay  - Repeat CT scan reviewed, findings do not suggest acute infection but chronic infiltrative disease that is improving, no acute intervention required, will recommend out patient pulmonary follow up with repeat CT after rehab stay.  - Cont. anticoagulation for DVT associated with ECMO cannula  - Consider incentive spirometry   - Care per primary team  - Discussed with spouse at bedside   - PT OOB as tolerated  - Outpatient pulmonary and rheumatology follow up upon discharge     Physical Examination:  GENERAL:                Alert, Oriented, No acute distress.     PULM:                     Bilateral air entry,  poor air entry at bases No Rales, No Rhonchi, No Wheezing  CVS:                         S1, S2,  No Murmur   EXT:                        Trace non pitting edema, nontender, No Cyanosis or Clubbing   NEURO:                  Alert, oriented, interactive, bilateral upper and lower extremity weakness   PSYC:                      Calm, + Insight.      Assessment  62F PMH A-Fib with recently diagnosed MCTD-ILD (+ARIANA 1:1280, RNP>8, Sm>8) who was admitted to [facility] with acute hypoxemic respiratory failure that initially responded to steroids, then with worsening after taper with development of acute hypoxemic and hypercapnic respiratory failure requiring intubation and VV- ECMO on 9/13, then transferred to [facility], concerning for DAH vs ILD flare, decannulated from VV-ECMO 9/21, extubated 9/22. S/p pulse steroids, PLEX (9/15, 9/18, 9/20) and Rituximab (9/16 & 10/3). Course c/b severe leukopenia s/p filgastrim, shock liver with slow to resolved hyperbilirubinemia, RIJ DVT, oropharyngeal dysphagia, and recurrent SVT. Repeat CT chest on 9/30 with markedly improved bilateral groundglass opacities with resolved lung consolidations. Now in acute rehab on 4 lpm NC oxygen and tapering dose of medrol.     Problem List:  1. Interstitial lung disease - due to   2. Mixed connective tissue disease   3. Acute hypoxia - Resolved  4. RIJ DVT     Plan:  Antibiotics as per primary team for UTI  Continue PCP ppx with atovaquone   f/u lactic acid, patient does not want IVF.     - Cont. Methylprednisolone PO, taper as per rheumatology recs. from [facility]   - Continue atovaquone for PCP prophylaxis  - S/p Rituximab 9/16 and 10/3  - S/p PLEX during MICU stay  - Repeat CT scan reviewed, findings do not suggest acute infection but chronic infiltrative disease that is improving, no acute intervention required, will recommend out patient pulmonary follow up with repeat CT after rehab stay.  - Cont. anticoagulation for DVT associated with ECMO cannula  - Consider incentive spirometry   - Care per primary team  - Discussed with spouse at bedside   - PT OOB as tolerated  - Outpatient pulmonary and rheumatology follow up upon discharge

## 2023-12-09 NOTE — PROGRESS NOTE ADULT - SUBJECTIVE AND OBJECTIVE BOX
Follow-up Pulmonary Progress Note  Chief Complaint : Interstitial lung disease    patient seen and examined  comfortable  on room air sat 93-94%    Allergies :No Known Allergies      PAST MEDICAL & SURGICAL HISTORY:  Afib    Sciatica    Interstitial lung disease    Lymphedema        Medications:  MEDICATIONS  (STANDING):  amoxicillin 500 milliGRAM(s) Oral three times a day  apixaban 5 milliGRAM(s) Oral every 12 hours  artificial  tears Solution 1 Drop(s) Both EYES every 12 hours  ascorbic acid 500 milliGRAM(s) Oral daily  atovaquone  Suspension 1500 milliGRAM(s) Oral daily  dextrose 5%. 1000 milliLiter(s) (50 mL/Hr) IV Continuous <Continuous>  dextrose 5%. 1000 milliLiter(s) (100 mL/Hr) IV Continuous <Continuous>  dextrose 50% Injectable 25 Gram(s) IV Push once  dextrose 50% Injectable 25 Gram(s) IV Push once  dextrose 50% Injectable 12.5 Gram(s) IV Push once  folic acid 1 milliGRAM(s) Oral daily  gabapentin 300 milliGRAM(s) Oral three times a day  glucagon  Injectable 1 milliGRAM(s) IntraMuscular once  lactobacillus acidophilus 1 Tablet(s) Oral two times a day with meals  lidocaine   4% Patch 2 Patch Transdermal <User Schedule>  lidocaine   4% Patch 1 Patch Transdermal <User Schedule>  lidocaine 2% Viscous 5 milliLiter(s) Swish and Spit three times a day  melatonin 6 milliGRAM(s) Oral at bedtime  methylPREDNISolone 36 milliGRAM(s) Oral daily  metoprolol tartrate 12.5 milliGRAM(s) Oral two times a day  multivitamin 1 Tablet(s) Oral daily  nystatin Powder 1 Application(s) Topical two times a day  pantoprazole    Tablet 40 milliGRAM(s) Oral before breakfast  polyethylene glycol 3350 17 Gram(s) Oral <User Schedule>  sodium chloride 0.9% Bolus 1000 milliLiter(s) IV Bolus once  sodium chloride 0.9% lock flush 5 milliLiter(s) IV Push two times a day  sodium chloride 0.9%. 1000 milliLiter(s) (100 mL/Hr) IV Continuous <Continuous>  zinc oxide 40% Paste 1 Application(s) Topical three times a day    MEDICATIONS  (PRN):  acetaminophen     Tablet .. 650 milliGRAM(s) Oral every 6 hours PRN Temp greater or equal to 38C (100.4F), Mild Pain (1 - 3), Moderate Pain (4 - 6)  albuterol/ipratropium for Nebulization 3 milliLiter(s) Nebulizer every 6 hours PRN Shortness of Breath and/or Wheezing  ALPRAZolam 0.25 milliGRAM(s) Oral every 6 hours PRN Anxiety  aluminum hydroxide/magnesium hydroxide/simethicone Suspension 30 milliLiter(s) Oral every 4 hours PRN Dyspepsia  ammonium lactate 12% Lotion 1 Application(s) Topical every 12 hours PRN BL feet dryness  baclofen 5 milliGRAM(s) Oral every 8 hours PRN Musculoskeletal Pain  benzocaine/menthol Lozenge 1 Lozenge Oral two times a day PRN Sore Throat  benzonatate 100 milliGRAM(s) Oral three times a day PRN Cough  bisacodyl 5 milliGRAM(s) Oral daily PRN Constipation  dextrose Oral Gel 15 Gram(s) Oral once PRN Blood Glucose LESS THAN 70 milliGRAM(s)/deciliter  hydrocortisone hemorrhoidal Suppository 1 Suppository(s) Rectal two times a day PRN Hemorrhoids  loperamide 2 milliGRAM(s) Oral three times a day PRN Diarrhea  SIMETHICONE SOFTGELS 250 milliGRAM(s),SIMETHICONE SOFTGELS 250 milliGRAM(s) 250 milliGRAM(s) Oral three times a day PRN for gas  sodium chloride 0.65% Nasal 1 Spray(s) Both Nostrils every 8 hours PRN Nasal Congestion      Antibiotics History  amoxicillin 500 milliGRAM(s) Oral three times a day, 12-07-23 @ 20:34  levoFLOXacin IVPB    , 12-07-23 @ 14:11  levoFLOXacin IVPB 500 milliGRAM(s) IV Intermittent once, 12-07-23 @ 13:51  levoFLOXacin IVPB 500 milliGRAM(s) IV Intermittent every 24 hours, 12-08-23 @ 13:51, Stop order after: 5 Doses  piperacillin/tazobactam IVPB. 3.375 Gram(s) IV Intermittent once, 12-06-23 @ 09:45, Stop order after: 1 Doses  piperacillin/tazobactam IVPB. 3.375 Gram(s) IV Intermittent once, 12-05-23 @ 12:15, Stop order after: 1 Doses  piperacillin/tazobactam IVPB.. 3.375 Gram(s) IV Intermittent every 8 hours, 12-06-23 @ 09:44, Stop order after: 7 Days  piperacillin/tazobactam IVPB.. 3.375 Gram(s) IV Intermittent every 8 hours, 12-05-23 @ 12:14, Stop order after: 7 Days  trimethoprim  160 mG/sulfamethoxazole 800 mG 1 Tablet(s) Oral two times a day, 12-04-23 @ 12:47, Stop order after: 3 Days  trimethoprim  160 mG/sulfamethoxazole 800 mG 1 Tablet(s) Oral two times a day, 12-05-23 @ 18:00, Stop order after: 2 Days      Heme Medications       GI Medications  bisacodyl 5 milliGRAM(s) Oral daily, 12-01-23 @ 10:45,  PRN  polyethylene glycol 3350 17 Gram(s) Oral <User Schedule>, 12-01-23 @ 10:58,            CULTURES: (if applicable)    Culture - Blood (collected 12-05-23 @ 11:00)  Source: .Blood Blood-Peripheral  Preliminary Report (12-09-23 @ 14:01):    No growth at 4 days    Culture - Blood (collected 12-05-23 @ 11:00)  Source: .Blood Blood-Peripheral  Preliminary Report (12-09-23 @ 14:01):    No growth at 4 days    Culture - Urine (collected 12-04-23 @ 13:45)  Source: Clean Catch Clean Catch (Midstream)  Final Report (12-07-23 @ 12:07):    >100,000 CFU/ml Enterococcus faecalis    <10,000 CFU/ml Normal Urogenital selvin present  Organism: Enterococcus faecalis (12-07-23 @ 12:07)  Organism: Enterococcus faecalis (12-07-23 @ 12:07)      Method Type: KIKE      -  Ampicillin: S <=2 Predicts results to ampicillin/sulbactam, amoxacillin-clavulanate and  piperacillin-tazobactam.      -  Ciprofloxacin: S <=1      -  Levofloxacin: S 1      -  Nitrofurantoin: S <=32 Should not be used to treat pyelonephritis.      -  Tetracycline: R >8      -  Vancomycin: S 2      Rapid RVP Result: NotDetec (12-05-23 @ 14:34)      Lactic Acid Trend  12-06-23 @ 12:15   -   3.1<H>  12-05-23 @ 11:00   -   2.5<H>     RADIOLOGY  CXR:  < from: Xray Chest 1 View AP/PA (12.06.23 @ 09:04) >    IMPRESSION:  1. Elevated right hemidiaphragm with adjacent subsegmental atelectasis is   similar to the previous study.  2. No evidence of pneumonia, pleural effusion or CHF.  3. In the setting of a shallow inspiratory effort with low lung volumes,   evaluation of the pulmonary interstitium is limited.    --- End of Report ---    < end of copied text >      CT:  < from: CT Chest No Cont (12.05.23 @ 14:56) >    IMPRESSION:  There are stable peripherally distributed reticular   opacities identified within the bilateral lung parenchyma with comparison   to examination November 10, 2023, without evidence for interval   development of infiltrate or lobar consolidation. No significant traction   bronchiectasis or honeycombing identified. Band type opacity within the   right lower lobe likely related to platelike atelectasis/scarring.      < end of copied text >    ECHO:      VITALS:  T(C): 36.7 (12-09-23 @ 08:21), Max: 36.7 (12-08-23 @ 20:38)  T(F): 98 (12-09-23 @ 08:21), Max: 98 (12-08-23 @ 20:38)  HR: 71 (12-09-23 @ 08:21) (71 - 82)  BP: 112/73 (12-09-23 @ 08:21) (112/73 - 127/72)  BP(mean): --  ABP: --  ABP(mean): --  RR: 16 (12-09-23 @ 08:21) (16 - 16)  SpO2: 93% (12-09-23 @ 08:21) (92% - 93%)  CVP(mm Hg): --  CVP(cm H2O): --    Ins and Outs

## 2023-12-09 NOTE — PROGRESS NOTE ADULT - SUBJECTIVE AND OBJECTIVE BOX
Cc: Interstitial Lung Disease    HPI: Patient with no new medical issues today.  Pain controlled, no chest pain, no N/V, no Fevers/Chills. No other new ROS  Has been tolerating rehabilitation program.    acetaminophen     Tablet .. 650 milliGRAM(s) Oral every 6 hours PRN  albuterol/ipratropium for Nebulization 3 milliLiter(s) Nebulizer every 6 hours PRN  ALPRAZolam 0.25 milliGRAM(s) Oral every 6 hours PRN  aluminum hydroxide/magnesium hydroxide/simethicone Suspension 30 milliLiter(s) Oral every 4 hours PRN  ammonium lactate 12% Lotion 1 Application(s) Topical every 12 hours PRN  amoxicillin 500 milliGRAM(s) Oral three times a day  apixaban 5 milliGRAM(s) Oral every 12 hours  artificial  tears Solution 1 Drop(s) Both EYES every 12 hours  ascorbic acid 500 milliGRAM(s) Oral daily  atovaquone  Suspension 1500 milliGRAM(s) Oral daily  baclofen 5 milliGRAM(s) Oral every 8 hours PRN  benzocaine/menthol Lozenge 1 Lozenge Oral two times a day PRN  benzonatate 100 milliGRAM(s) Oral three times a day PRN  bisacodyl 5 milliGRAM(s) Oral daily PRN  dextrose 5%. 1000 milliLiter(s) IV Continuous <Continuous>  dextrose 5%. 1000 milliLiter(s) IV Continuous <Continuous>  dextrose 50% Injectable 12.5 Gram(s) IV Push once  dextrose 50% Injectable 25 Gram(s) IV Push once  dextrose 50% Injectable 25 Gram(s) IV Push once  dextrose Oral Gel 15 Gram(s) Oral once PRN  folic acid 1 milliGRAM(s) Oral daily  gabapentin 300 milliGRAM(s) Oral three times a day  glucagon  Injectable 1 milliGRAM(s) IntraMuscular once  hydrocortisone hemorrhoidal Suppository 1 Suppository(s) Rectal two times a day PRN  lactobacillus acidophilus 1 Tablet(s) Oral two times a day with meals  lidocaine   4% Patch 2 Patch Transdermal <User Schedule>  lidocaine   4% Patch 1 Patch Transdermal <User Schedule>  lidocaine 2% Viscous 5 milliLiter(s) Swish and Spit three times a day  loperamide 2 milliGRAM(s) Oral three times a day PRN  melatonin 6 milliGRAM(s) Oral at bedtime  methylPREDNISolone 36 milliGRAM(s) Oral daily  metoprolol tartrate 12.5 milliGRAM(s) Oral two times a day  multivitamin 1 Tablet(s) Oral daily  nystatin Powder 1 Application(s) Topical two times a day  pantoprazole    Tablet 40 milliGRAM(s) Oral before breakfast  polyethylene glycol 3350 17 Gram(s) Oral <User Schedule>  SIMETHICONE SOFTGELS 250 milliGRAM(s),SIMETHICONE SOFTGELS 250 milliGRAM(s) 250 milliGRAM(s) Oral three times a day PRN  sodium chloride 0.65% Nasal 1 Spray(s) Both Nostrils every 8 hours PRN  sodium chloride 0.9% Bolus 1000 milliLiter(s) IV Bolus once  sodium chloride 0.9% lock flush 5 milliLiter(s) IV Push two times a day  sodium chloride 0.9%. 1000 milliLiter(s) IV Continuous <Continuous>  zinc oxide 40% Paste 1 Application(s) Topical three times a day      T(C): 36.7 (12-09-23 @ 08:21), Max: 36.7 (12-08-23 @ 20:38)  HR: 71 (12-09-23 @ 08:21) (71 - 82)  BP: 112/73 (12-09-23 @ 08:21) (112/73 - 127/72)  RR: 16 (12-09-23 @ 08:21) (16 - 16)  SpO2: 93% (12-09-23 @ 08:21) (92% - 93%)    In NAD  HEENT- EOMI  Heart- Well Perfused  Lungs- No resp distress, no use of accessory resp muscles  Abd- + BS, NT  Ext- No calf pain  Neuro- Exam unchanged  Psych- Affect wnl          Imp: Patient with diagnosis of    Interstitial Lung Disease      admitted for comprehensive acute rehabilitation.    Plan:  - Continue therapies  - DVT prophylaxis  - Skin- Turn q2h, check skin daily  - Continue current medications; patient medically stable.   - Patient is stable to continue current rehabilitation program.

## 2023-12-09 NOTE — PROGRESS NOTE ADULT - ASSESSMENT
From primary team notes:  Assessment/Plan:  ALIZA FOLEY is a 62 year old female with PMH of pAFIB and sciatica; who presented to [facility] ED with SOB, and was found to have groundglass opacities and interstitial lung disease. She was treated with empiric Vancomycin and Zosyn. She underwent a bronchoscopy with bronchoalveolar lavage and pulse steroids. She was given IV diuretics for increased pulmonary congestion, but her respiratory status continued to worsen.  On 9/13, she was transferred to [facility] for ECMO requirements. She was further treated with Plasmapheresis, Rituximab and high-dose steroids, with significant improvement. Her overall hospital course was complicated by thrombocytopenia (s/p several platelet transfusions), leukocytosis (infectious workup entirely negative- s/p Vanco and Ceftriaxone), AFIB with RVR (resolved with Metoprolol), dysphagia (requiring NGT), transaminitis (secondary to acute illness and hypotension),  non-occlusive RIJ DVT (started on Lovenox). Patient now admitted for a multidisciplinary rehab program. 10-05-23 @ 13:18    * Sustained functional improvement including progress with Wt bearing LE and improvement with ability at transfers, concentrate on prox LE muscle strengthening  * Making further progress, increased ambulatory distance with walker up to 80 ft  * UTI - Amoxicillin TID, per ID   * Cankre sore- Lidocaine swish and spit TID x3 days     #Interstitial Lung Disease and Mixed connective tissue disease  - Gait Instability, ADL impairments and Functional impairments: start Comprehensive Rehab Program of PT/OT/SLP - 3 hours a day, 5 days a week  - P&O as needed   - Non-specific Interstitial Lung Disease  - Requiring ECMO   - Improvement s/p Plasmapheresis, Rituximab and high- dose steroids   - Albuterol/Ipratropium Nebulizer every 6 hours  - Mepron 1500mg daily  - PO Medrol ( due to liver dysfunction): Plan will be to taper by ~20% every 2 weeks    Medrol   64mg x 2 weeks   56mg x 2 weeks  44mg x 2 weeks   36mg x 2 weeks - Follow up Outpatient   - Plan to wean 02 as tolerated, Continue tapering oxygen,  -  CT Chest noted 11/10---Resp f/u review  11/14, appreciated  They reports sustained improvement, clinical (normal oxy sats on R/A, sustained progress with oxy tapering), and radiological (no acute findings on recent CT Chest, rather residual chronic infiltrative diesease noted, with recommendation to repeat CT after Acute rehab treatment     #Fever  - Recent UTI on Bactrim (since DCd)  - UCX with enterococcus faecalis  - 101.4 fever overnight (12/4-12/5)  - Lactate of 2.5- patient refused IV fluids   - Start Zosyn IVPB 12/6 (pt agreeable)  - RVP negative  - Await blood cultures, CXR  - ID consult and Pulm re-eval (no respiratory symptoms)  - Pulm recs: continue to monitor   - ID recs: CT chest- stable (12/5)   - Repeat Lactate of 3.1 - agreeable to IV fluids- s/p 1000mL bolus   - BL LE doppler- negative     #Cankre sore  - Lidocaine swish and spit TID x3 days     #Knee buckling  - 12/1: S/p guided fall with physical therapy  - Back muscle strain with catching self on RW   - Lidocaine patch to back and BL deltoids  - Ibuprofen 400mg x1 dose     #Experiencing R mandible and occipital numbness, with associated hair loss- outpatient follow up with Rheumatology    #Posttnasal drip--ENT review 11/7, declined scope, continue flonase     #pAFIB/Rt Ij thrombus  - Metoprolol 25mg BID and lovenox  --- Eliquis 5mg BID - tolerating well     #Chronic Tinnitus   - ENT review and recs appreciate, Patient already made ENT appointment for f/u    #Lymphedema both legs -chronic and Rt IJ thrombus  - ACE Wrap BL LEs  - BL LE doppler negative for DVT  - BL UE doppler with chronic thrombotic changes in RIJ     #Transaminitis --LFT Down trending   - Secondary to acute illness and hypotension at [facility]  - Outpatient follow up     #Leucocytosis--steroid related, continue monitoring     #Neuropathy  - Gabapentin 300 mg BID, will consider increasing dose, increase to TID 11/10  --Work on motor strengthening, priority for UE as preferred by patient   - Vit b12 >2,000 in 9/2023  --Will consider Rhuematology f/u if needed    #Sleep/Mood  - Melatonin 6mg at HS  - Psych rec Xanax 0.25mg PRN    #Skin  - Skin: Left lower abdomen ruptured blister 3.0 x 1.0, stage 2 pressure injury to right buttock 4 x1 and left buttock 3x1, stage 2 pressure injury ti right inner thigh 0.2 x 0.2, right groin wound 10.5 x 2.0,   Wound care review, Left groin wound 11/15--- 0.6x1.8.0.1cm, no slough, healthy base  -- MAD to skin folds- Nystatin to submammary and abdominal pannus BID  -- MAD to L groin- cleanse with NS, Triad cream daily  -- Mad to R groin- Nystatin powder daily   -- R groin wound-- aquacel packing, Triad to periwound, Allevyn foam- daily and PRN   - Pressure injury/Skin: OOB to Chair, PT/OT   - Offload pressure, low air loss support surfaces, T&P every 2 hours   --Wound care consult-10/9 -Multiple wounds--perineal and buttock/sacral, recs appreciated   --Suggest Continue treatments as below:   Twice daily & PRN Normal Saline Cleanse of abdominal pannus & breast folds, perineal, Left & Right inner buttock erosions.  Pat thoroughly dry.   Apply Desitin generously twice daily (& PRN) to perineal, buttocks, and left groin erosions, leave open to air.    Apply Nystatin powder to abdominal pannus and breast folds.  Suggest use of Interdry cloths in folds to 'wick' moisture.  Suggest daily Normal Saline Cleanse of right groin surgical wound, pat dry.  Apply Cavillon film barrier to intact periwound skin.  Place Aquacell strip over open area, cover with foam dressing.  - Wound care following     #Pain Mgmt   - Tylenol PRN   - Gabapentin 300mg at HS     #GI/Bowel Mgmt/Hx of alternating bowel movements  - Pantoprazole  - PRN: Maalox   --Lactobacillus BID     #/Bladder Mgmt   -Spontaneously voiding   - UA (11/3) negative  - +UTI- Amoxicillin     #FEN   #Dysphagia  - Diet - Minced & Moist  [CC]    - Dysphagia- SLP evaluation appreciated     #Health maintenance  - Folic Acid   - MVI  - Ocean nasal spray    # Difficulty with access to peripheral veins-- Blood draws from Left arm Medline     #Precautions / PROPHYLAXIS:   - Falls  - ortho: Weight bearing status: WBAT   - Lungs: Aspiration, Incentive Spirometer   - DVT PPX: Eliquis 5 mg BID   ----------------------------------------  11/28 --Labs CBC mild anemia, normal renal function, LFT elevated, downtrending overall unremarkable   ----------------------------------------  Liaison with other providers/agencies    PEER TO PEER with Dr Tripp (patient's insurance company)  * Peer to Peer completed, insurance approval retrospectively given to cover from 11/14 to 11/20, Insurance co awaits updated notes to determine further approval of coverage   SW team to send updated clinical and functional notes to medical insurance company  ----------------------------------------  IDT conference on 12/5  Social Work: Awaiting insurance response on clinicals  SLP: --   OT: Setup for eating/grooming. Sup for UBD. Min A for LBD. Mod A x2 for shower transfer. CG with SB for toilet transfer. Mod A x1 for toileting/hygiene.  PT: Min A for sit to supine. Mod A for supine to sit. Max A for transfers. Min A x1 for stand pivot transfer with RW. Amb 65 ft with bariatric RW and WCF with min A. 6 inch step mod A x2.   RT: Limited participation.   DME: Bariatric RW, WC, drop arm commode  Barriers: New fevers  TDD: 12/13/23 to home.  ----------------------------------------  OUTPATIENT/FOLLOW UP:    Trae Whitney  Pulmonary Disease  [address], [address] [place] [address]  Phone: [phone]  Fax: [phone]  Follow Up Time: 2 weeks    Ryanne Allen  Rheumatology  [address] [address]  Phone: [phone]  Fax: [phone]  Follow Up Time: 1 week    Kyle Pierce  Internal Medicine  [address] [place] [address], [address]  [place] [address]  Phone: [phone]  Fax: [phone]  Follow Up Time: 2 weeks    Addy Powers  Pulmonary Disease  [address] [facility], [facility] [address]  [place], [place] [id number]  Phone: [phone]  Fax: [phone]  Follow Up Time:     Kwame Hawley  Critical Care Medicine  [address] [facility], [facility] [address]  [place] [address]  Phone: [phone]  Fax: [phone]  Follow Up Time:     Neena Borrego  Rheumatology  [address] [facility], [address]  [place] [address]  Phone: [phone]  Fax: [phone]  Follow Up Time:    [facility]  [facility] [address]  Scheduled Appointment: 09/13/2023  2:40 PM     From primary team notes:  Assessment/Plan:  ALIZA FOLEY is a 62 year old female with PMH of pAFIB and sciatica; who presented to [facility] ED with SOB, and was found to have groundglass opacities and interstitial lung disease. She was treated with empiric Vancomycin and Zosyn. She underwent a bronchoscopy with bronchoalveolar lavage and pulse steroids. She was given IV diuretics for increased pulmonary congestion, but her respiratory status continued to worsen.  On 9/13, she was transferred to [facility] for ECMO requirements. She was further treated with Plasmapheresis, Rituximab and high-dose steroids, with significant improvement. Her overall hospital course was complicated by thrombocytopenia (s/p several platelet transfusions), leukocytosis (infectious workup entirely negative- s/p Vanco and Ceftriaxone), AFIB with RVR (resolved with Metoprolol), dysphagia (requiring NGT), transaminitis (secondary to acute illness and hypotension),  non-occlusive RIJ DVT (started on Lovenox). Patient now admitted for a multidisciplinary rehab program. 10-05-23 @ 13:18    * Sustained functional improvement including progress with Wt bearing LE and improvement with ability at transfers, concentrate on prox LE muscle strengthening  * Making further progress, increased ambulatory distance with walker up to 80 ft  * UTI - Amoxicillin TID, per ID   * Cankre sore- Lidocaine swish and spit TID x3 days     #Interstitial Lung Disease and Mixed connective tissue disease  - Gait Instability, ADL impairments and Functional impairments: start Comprehensive Rehab Program of PT/OT/SLP - 3 hours a day, 5 days a week  - P&O as needed   - Non-specific Interstitial Lung Disease  - Requiring ECMO   - Improvement s/p Plasmapheresis, Rituximab and high- dose steroids   - Albuterol/Ipratropium Nebulizer every 6 hours  - Mepron 1500mg daily  - PO Medrol ( due to liver dysfunction): Plan will be to taper by ~20% every 2 weeks    Medrol   64mg x 2 weeks   56mg x 2 weeks  44mg x 2 weeks   36mg x 2 weeks - Follow up Outpatient   - Plan to wean 02 as tolerated, Continue tapering oxygen,  -  CT Chest noted 11/10---Resp f/u review  11/14, appreciated  They reports sustained improvement, clinical (normal oxy sats on R/A, sustained progress with oxy tapering), and radiological (no acute findings on recent CT Chest, rather residual chronic infiltrative diesease noted, with recommendation to repeat CT after Acute rehab treatment     #Fever  - Recent UTI on Bactrim (since DCd)  - UCX with enterococcus faecalis  - 101.4 fever overnight (12/4-12/5)  - Lactate of 2.5- patient refused IV fluids   - Start Zosyn IVPB 12/6 (pt agreeable)  - RVP negative  - Await blood cultures, CXR  - ID consult and Pulm re-eval (no respiratory symptoms)  - Pulm recs: continue to monitor   - ID recs: CT chest- stable (12/5)   - Repeat Lactate of 3.1 - agreeable to IV fluids- s/p 1000mL bolus   - BL LE doppler- negative     #Cankre sore  - Lidocaine swish and spit TID x3 days     #Knee buckling  - 12/1: S/p guided fall with physical therapy  - Back muscle strain with catching self on RW   - Lidocaine patch to back and BL deltoids  - Ibuprofen 400mg x1 dose     #Experiencing R mandible and occipital numbness, with associated hair loss- outpatient follow up with Rheumatology    #Posttnasal drip--ENT review 11/7, declined scope, continue flonase     #pAFIB/Rt Ij thrombus  - Metoprolol 25mg BID and lovenox  --- Eliquis 5mg BID - tolerating well     #Chronic Tinnitus   - ENT review and recs appreciate, Patient already made ENT appointment for f/u    #Lymphedema both legs -chronic and Rt IJ thrombus  - ACE Wrap BL LEs  - BL LE doppler negative for DVT  - BL UE doppler with chronic thrombotic changes in RIJ     #Transaminitis --LFT Down trending   - Secondary to acute illness and hypotension at [facility]  - Outpatient follow up     #Leucocytosis--steroid related, continue monitoring     #Neuropathy  - Gabapentin 300 mg BID, will consider increasing dose, increase to TID 11/10  --Work on motor strengthening, priority for UE as preferred by patient   - Vit b12 >2,000 in 9/2023  --Will consider Rhuematology f/u if needed    #Sleep/Mood  - Melatonin 6mg at HS  - Psych rec Xanax 0.25mg PRN    #Skin  - Skin: Left lower abdomen ruptured blister 3.0 x 1.0, stage 2 pressure injury to right buttock 4 x1 and left buttock 3x1, stage 2 pressure injury ti right inner thigh 0.2 x 0.2, right groin wound 10.5 x 2.0,   Wound care review, Left groin wound 11/15--- 0.6x1.8.0.1cm, no slough, healthy base  -- MAD to skin folds- Nystatin to submammary and abdominal pannus BID  -- MAD to L groin- cleanse with NS, Triad cream daily  -- Mad to R groin- Nystatin powder daily   -- R groin wound-- aquacel packing, Triad to periwound, Allevyn foam- daily and PRN   - Pressure injury/Skin: OOB to Chair, PT/OT   - Offload pressure, low air loss support surfaces, T&P every 2 hours   --Wound care consult-10/9 -Multiple wounds--perineal and buttock/sacral, recs appreciated   --Suggest Continue treatments as below:   Twice daily & PRN Normal Saline Cleanse of abdominal pannus & breast folds, perineal, Left & Right inner buttock erosions.  Pat thoroughly dry.   Apply Desitin generously twice daily (& PRN) to perineal, buttocks, and left groin erosions, leave open to air.    Apply Nystatin powder to abdominal pannus and breast folds.  Suggest use of Interdry cloths in folds to 'wick' moisture.  Suggest daily Normal Saline Cleanse of right groin surgical wound, pat dry.  Apply Cavillon film barrier to intact periwound skin.  Place Aquacell strip over open area, cover with foam dressing.  - Wound care following     #Pain Mgmt   - Tylenol PRN   - Gabapentin 300mg at HS     #GI/Bowel Mgmt/Hx of alternating bowel movements  - Pantoprazole  - PRN: Maalox   --Lactobacillus BID     #/Bladder Mgmt   -Spontaneously voiding   - UA (11/3) negative  - +UTI- Amoxicillin     #FEN   #Dysphagia  - Diet - Minced & Moist  [CC]    - Dysphagia- SLP evaluation appreciated     #Health maintenance  - Folic Acid   - MVI  - Ocean nasal spray    # Difficulty with access to peripheral veins-- Blood draws from Left arm Medline     #Precautions / PROPHYLAXIS:   - Falls  - ortho: Weight bearing status: WBAT   - Lungs: Aspiration, Incentive Spirometer   - DVT PPX: Eliquis 5 mg BID   ----------------------------------------  11/28 --Labs CBC mild anemia, normal renal function, LFT elevated, downtrending overall unremarkable   ----------------------------------------  Liaison with other providers/agencies    PEER TO PEER with Dr Tripp (patient's insurance company)  * Peer to Peer completed, insurance approval retrospectively given to cover from 11/14 to 11/20, Insurance co awaits updated notes to determine further approval of coverage   SW team to send updated clinical and functional notes to medical insurance company  ----------------------------------------  IDT conference on 12/5  Social Work: Awaiting insurance response on clinicals  SLP: --   OT: Setup for eating/grooming. Sup for UBD. Min A for LBD. Mod A x2 for shower transfer. CG with SB for toilet transfer. Mod A x1 for toileting/hygiene.  PT: Min A for sit to supine. Mod A for supine to sit. Max A for transfers. Min A x1 for stand pivot transfer with RW. Amb 65 ft with bariatric RW and WCF with min A. 6 inch step mod A x2.   RT: Limited participation.   DME: Bariatric RW, WC, drop arm commode  Barriers: New fevers  TDD: 12/13/23 to home.  ----------------------------------------  OUTPATIENT/FOLLOW UP:    Trae Whitney  Pulmonary Disease  [address], [address] [place] [address]  Phone: [phone]  Fax: [phone]  Follow Up Time: 2 weeks    Ryanne Allen  Rheumatology  [address] [address]  Phone: [phone]  Fax: [phone]  Follow Up Time: 1 week    Kyle Pierce  Internal Medicine  [address] [place] [address], [address]  [place] [address]  Phone: [phone]  Fax: [phone]  Follow Up Time: 2 weeks    Addy Powers  Pulmonary Disease  [address] [facility], [facility] [address]  [place], [place] [id number]  Phone: [phone]  Fax: [phone]  Follow Up Time:     Kwame Hawley  Critical Care Medicine  [address] [facility], [facility] [address]  [place] [address]  Phone: [phone]  Fax: [phone]  Follow Up Time:     Neena Borrego  Rheumatology  [address] [facility], [address]  [place] [address]  Phone: [phone]  Fax: [phone]  Follow Up Time:    [facility]  [facility] [address]  Scheduled Appointment: 09/13/2023  2:40 PM

## 2023-12-10 LAB
CULTURE RESULTS: SIGNIFICANT CHANGE UP
SPECIMEN SOURCE: SIGNIFICANT CHANGE UP

## 2023-12-10 PROCEDURE — 99232 SBSQ HOSP IP/OBS MODERATE 35: CPT

## 2023-12-10 RX ADMIN — ZINC OXIDE 1 APPLICATION(S): 200 OINTMENT TOPICAL at 21:15

## 2023-12-10 RX ADMIN — GABAPENTIN 300 MILLIGRAM(S): 400 CAPSULE ORAL at 14:03

## 2023-12-10 RX ADMIN — NYSTATIN CREAM 1 APPLICATION(S): 100000 CREAM TOPICAL at 21:13

## 2023-12-10 RX ADMIN — Medication 1 TABLET(S): at 21:12

## 2023-12-10 RX ADMIN — ATOVAQUONE 1500 MILLIGRAM(S): 750 SUSPENSION ORAL at 21:12

## 2023-12-10 RX ADMIN — LIDOCAINE 1 PATCH: 4 CREAM TOPICAL at 21:19

## 2023-12-10 RX ADMIN — PANTOPRAZOLE SODIUM 40 MILLIGRAM(S): 20 TABLET, DELAYED RELEASE ORAL at 09:35

## 2023-12-10 RX ADMIN — Medication 500 MILLIGRAM(S): at 14:03

## 2023-12-10 RX ADMIN — LIDOCAINE 1 PATCH: 4 CREAM TOPICAL at 09:33

## 2023-12-10 RX ADMIN — Medication 1 TABLET(S): at 09:35

## 2023-12-10 RX ADMIN — Medication 36 MILLIGRAM(S): at 09:34

## 2023-12-10 RX ADMIN — Medication 1 MILLIGRAM(S): at 21:11

## 2023-12-10 RX ADMIN — Medication 1 DROP(S): at 21:16

## 2023-12-10 RX ADMIN — GABAPENTIN 300 MILLIGRAM(S): 400 CAPSULE ORAL at 09:36

## 2023-12-10 RX ADMIN — Medication 1 TABLET(S): at 21:13

## 2023-12-10 RX ADMIN — Medication 12.5 MILLIGRAM(S): at 21:11

## 2023-12-10 RX ADMIN — Medication 500 MILLIGRAM(S): at 21:19

## 2023-12-10 RX ADMIN — APIXABAN 5 MILLIGRAM(S): 2.5 TABLET, FILM COATED ORAL at 21:12

## 2023-12-10 RX ADMIN — LIDOCAINE 2 PATCH: 4 CREAM TOPICAL at 21:19

## 2023-12-10 RX ADMIN — GABAPENTIN 300 MILLIGRAM(S): 400 CAPSULE ORAL at 21:14

## 2023-12-10 RX ADMIN — Medication 12.5 MILLIGRAM(S): at 09:35

## 2023-12-10 RX ADMIN — LIDOCAINE 2 PATCH: 4 CREAM TOPICAL at 19:58

## 2023-12-10 RX ADMIN — LIDOCAINE 5 MILLILITER(S): 4 CREAM TOPICAL at 09:34

## 2023-12-10 RX ADMIN — Medication 500 MILLIGRAM(S): at 21:14

## 2023-12-10 RX ADMIN — Medication 500 MILLIGRAM(S): at 09:34

## 2023-12-10 RX ADMIN — SODIUM CHLORIDE 5 MILLILITER(S): 9 INJECTION INTRAMUSCULAR; INTRAVENOUS; SUBCUTANEOUS at 21:19

## 2023-12-10 RX ADMIN — LIDOCAINE 2 PATCH: 4 CREAM TOPICAL at 09:33

## 2023-12-10 RX ADMIN — LIDOCAINE 1 PATCH: 4 CREAM TOPICAL at 19:58

## 2023-12-10 RX ADMIN — APIXABAN 5 MILLIGRAM(S): 2.5 TABLET, FILM COATED ORAL at 09:35

## 2023-12-10 RX ADMIN — ZINC OXIDE 1 APPLICATION(S): 200 OINTMENT TOPICAL at 14:04

## 2023-12-10 NOTE — PROGRESS NOTE ADULT - ASSESSMENT
From primary team notes:  Assessment/Plan:  ALIZA FOLEY is a 62 year old female with PMH of pAFIB and sciatica; who presented to [facility] ED with SOB, and was found to have groundglass opacities and interstitial lung disease. She was treated with empiric Vancomycin and Zosyn. She underwent a bronchoscopy with bronchoalveolar lavage and pulse steroids. She was given IV diuretics for increased pulmonary congestion, but her respiratory status continued to worsen.  On 9/13, she was transferred to [facility] for ECMO requirements. She was further treated with Plasmapheresis, Rituximab and high-dose steroids, with significant improvement. Her overall hospital course was complicated by thrombocytopenia (s/p several platelet transfusions), leukocytosis (infectious workup entirely negative- s/p Vanco and Ceftriaxone), AFIB with RVR (resolved with Metoprolol), dysphagia (requiring NGT), transaminitis (secondary to acute illness and hypotension),  non-occlusive RIJ DVT (started on Lovenox). Patient now admitted for a multidisciplinary rehab program. 10-05-23 @ 13:18    * Sustained functional improvement including progress with Wt bearing LE and improvement with ability at transfers, concentrate on prox LE muscle strengthening  * Making further progress, increased ambulatory distance with walker up to 80 ft  * UTI - Amoxicillin TID, per ID   * Cankre sore- Lidocaine swish and spit TID x3 days     #Interstitial Lung Disease and Mixed connective tissue disease  - Gait Instability, ADL impairments and Functional impairments: start Comprehensive Rehab Program of PT/OT/SLP - 3 hours a day, 5 days a week  - P&O as needed   - Non-specific Interstitial Lung Disease  - Requiring ECMO   - Improvement s/p Plasmapheresis, Rituximab and high- dose steroids   - Albuterol/Ipratropium Nebulizer every 6 hours  - Mepron 1500mg daily  - PO Medrol ( due to liver dysfunction): Plan will be to taper by ~20% every 2 weeks    Medrol   64mg x 2 weeks   56mg x 2 weeks  44mg x 2 weeks   36mg x 2 weeks - Follow up Outpatient   - Plan to wean 02 as tolerated, Continue tapering oxygen,  -  CT Chest noted 11/10---Resp f/u review  11/14, appreciated  They reports sustained improvement, clinical (normal oxy sats on R/A, sustained progress with oxy tapering), and radiological (no acute findings on recent CT Chest, rather residual chronic infiltrative diesease noted, with recommendation to repeat CT after Acute rehab treatment     #Fever  - Recent UTI on Bactrim (since DCd)  - UCX with enterococcus faecalis  - 101.4 fever overnight (12/4-12/5)  - Lactate of 2.5- patient refused IV fluids   - Start Zosyn IVPB 12/6 (pt agreeable)  - RVP negative  - Await blood cultures, CXR  - ID consult and Pulm re-eval (no respiratory symptoms)  - Pulm recs: continue to monitor   - ID recs: CT chest- stable (12/5)   - Repeat Lactate of 3.1 - agreeable to IV fluids- s/p 1000mL bolus   - BL LE doppler- negative     #Cankre sore  - Lidocaine swish and spit TID x3 days     #Knee buckling  - 12/1: S/p guided fall with physical therapy  - Back muscle strain with catching self on RW   - Lidocaine patch to back and BL deltoids  - Ibuprofen 400mg x1 dose     #Experiencing R mandible and occipital numbness, with associated hair loss- outpatient follow up with Rheumatology    #Posttnasal drip--ENT review 11/7, declined scope, continue flonase     #pAFIB/Rt Ij thrombus  - Metoprolol 25mg BID and lovenox  --- Eliquis 5mg BID - tolerating well     #Chronic Tinnitus   - ENT review and recs appreciate, Patient already made ENT appointment for f/u    #Lymphedema both legs -chronic and Rt IJ thrombus  - ACE Wrap BL LEs  - BL LE doppler negative for DVT  - BL UE doppler with chronic thrombotic changes in RIJ     #Transaminitis --LFT Down trending   - Secondary to acute illness and hypotension at [facility]  - Outpatient follow up     #Leucocytosis--steroid related, continue monitoring     #Neuropathy  - Gabapentin 300 mg BID, will consider increasing dose, increase to TID 11/10  --Work on motor strengthening, priority for UE as preferred by patient   - Vit b12 >2,000 in 9/2023  --Will consider Rhuematology f/u if needed    #Sleep/Mood  - Melatonin 6mg at HS  - Psych rec Xanax 0.25mg PRN    #Skin  - Skin: Left lower abdomen ruptured blister 3.0 x 1.0, stage 2 pressure injury to right buttock 4 x1 and left buttock 3x1, stage 2 pressure injury ti right inner thigh 0.2 x 0.2, right groin wound 10.5 x 2.0,   Wound care review, Left groin wound 11/15--- 0.6x1.8.0.1cm, no slough, healthy base  -- MAD to skin folds- Nystatin to submammary and abdominal pannus BID  -- MAD to L groin- cleanse with NS, Triad cream daily  -- Mad to R groin- Nystatin powder daily   -- R groin wound-- aquacel packing, Triad to periwound, Allevyn foam- daily and PRN   - Pressure injury/Skin: OOB to Chair, PT/OT   - Offload pressure, low air loss support surfaces, T&P every 2 hours   --Wound care consult-10/9 -Multiple wounds--perineal and buttock/sacral, recs appreciated   --Suggest Continue treatments as below:   Twice daily & PRN Normal Saline Cleanse of abdominal pannus & breast folds, perineal, Left & Right inner buttock erosions.  Pat thoroughly dry.   Apply Desitin generously twice daily (& PRN) to perineal, buttocks, and left groin erosions, leave open to air.    Apply Nystatin powder to abdominal pannus and breast folds.  Suggest use of Interdry cloths in folds to 'wick' moisture.  Suggest daily Normal Saline Cleanse of right groin surgical wound, pat dry.  Apply Cavillon film barrier to intact periwound skin.  Place Aquacell strip over open area, cover with foam dressing.  - Wound care following     #Pain Mgmt   - Tylenol PRN   - Gabapentin 300mg at HS     #GI/Bowel Mgmt/Hx of alternating bowel movements  - Pantoprazole  - PRN: Maalox   --Lactobacillus BID     #/Bladder Mgmt   -Spontaneously voiding   - UA (11/3) negative  - +UTI- Amoxicillin     #FEN   #Dysphagia  - Diet - Minced & Moist  [CC]    - Dysphagia- SLP evaluation appreciated     #Health maintenance  - Folic Acid   - MVI  - Ocean nasal spray    # Difficulty with access to peripheral veins-- Blood draws from Left arm Medline     #Precautions / PROPHYLAXIS:   - Falls  - ortho: Weight bearing status: WBAT   - Lungs: Aspiration, Incentive Spirometer   - DVT PPX: Eliquis 5 mg BID   ----------------------------------------  11/28 --Labs CBC mild anemia, normal renal function, LFT elevated, downtrending overall unremarkable   ----------------------------------------  Liaison with other providers/agencies    PEER TO PEER with Dr Tripp (patient's insurance company)  * Peer to Peer completed, insurance approval retrospectively given to cover from 11/14 to 11/20, Insurance co awaits updated notes to determine further approval of coverage   SW team to send updated clinical and functional notes to medical insurance company  ----------------------------------------  IDT conference on 12/5  Social Work: Awaiting insurance response on clinicals  SLP: --   OT: Setup for eating/grooming. Sup for UBD. Min A for LBD. Mod A x2 for shower transfer. CG with SB for toilet transfer. Mod A x1 for toileting/hygiene.  PT: Min A for sit to supine. Mod A for supine to sit. Max A for transfers. Min A x1 for stand pivot transfer with RW. Amb 65 ft with bariatric RW and WCF with min A. 6 inch step mod A x2.   RT: Limited participation.   DME: Bariatric RW, WC, drop arm commode  Barriers: New fevers  TDD: 12/13/23 to home.  ----------------------------------------  OUTPATIENT/FOLLOW UP:    Trae Whitney  Pulmonary Disease  [address], [address] [place] [address]  Phone: [phone]  Fax: [phone]  Follow Up Time: 2 weeks    Ryanne Allen  Rheumatology  [address] [address]  Phone: [phone]  Fax: [phone]  Follow Up Time: 1 week    Kyle Pierce  Internal Medicine  [address] [place] [address], [address]  [place] [address]  Phone: [phone]  Fax: [phone]  Follow Up Time: 2 weeks    Addy Powers  Pulmonary Disease  [address] [facility], [facility] [address]  [place], [place] [address]  Phone: [phone]  Fax: [phone]  Follow Up Time:     Kwame Hawley  Critical Care Medicine  [address] [facility], [facility] [address]  [place] [address]  Phone: [phone]  Fax: [phone]  Follow Up Time:     Neena Borrego  Rheumatology  [address] [facility], [address] 3  [place] [address]  Phone: [phone]  Fax: [phone]  Follow Up Time:    [facility]  [facility] [address]  Scheduled Appointment: 09/13/2023  2:40 PM     From primary team notes:  Assessment/Plan:  ALIZA FOLEY is a 62 year old female with PMH of pAFIB and sciatica; who presented to [facility] ED with SOB, and was found to have groundglass opacities and interstitial lung disease. She was treated with empiric Vancomycin and Zosyn. She underwent a bronchoscopy with bronchoalveolar lavage and pulse steroids. She was given IV diuretics for increased pulmonary congestion, but her respiratory status continued to worsen.  On 9/13, she was transferred to [facility] for ECMO requirements. She was further treated with Plasmapheresis, Rituximab and high-dose steroids, with significant improvement. Her overall hospital course was complicated by thrombocytopenia (s/p several platelet transfusions), leukocytosis (infectious workup entirely negative- s/p Vanco and Ceftriaxone), AFIB with RVR (resolved with Metoprolol), dysphagia (requiring NGT), transaminitis (secondary to acute illness and hypotension),  non-occlusive RIJ DVT (started on Lovenox). Patient now admitted for a multidisciplinary rehab program. 10-05-23 @ 13:18    * Sustained functional improvement including progress with Wt bearing LE and improvement with ability at transfers, concentrate on prox LE muscle strengthening  * Making further progress, increased ambulatory distance with walker up to 80 ft  * UTI - Amoxicillin TID, per ID   * Cankre sore- Lidocaine swish and spit TID x3 days     #Interstitial Lung Disease and Mixed connective tissue disease  - Gait Instability, ADL impairments and Functional impairments: start Comprehensive Rehab Program of PT/OT/SLP - 3 hours a day, 5 days a week  - P&O as needed   - Non-specific Interstitial Lung Disease  - Requiring ECMO   - Improvement s/p Plasmapheresis, Rituximab and high- dose steroids   - Albuterol/Ipratropium Nebulizer every 6 hours  - Mepron 1500mg daily  - PO Medrol ( due to liver dysfunction): Plan will be to taper by ~20% every 2 weeks    Medrol   64mg x 2 weeks   56mg x 2 weeks  44mg x 2 weeks   36mg x 2 weeks - Follow up Outpatient   - Plan to wean 02 as tolerated, Continue tapering oxygen,  -  CT Chest noted 11/10---Resp f/u review  11/14, appreciated  They reports sustained improvement, clinical (normal oxy sats on R/A, sustained progress with oxy tapering), and radiological (no acute findings on recent CT Chest, rather residual chronic infiltrative diesease noted, with recommendation to repeat CT after Acute rehab treatment     #Fever  - Recent UTI on Bactrim (since DCd)  - UCX with enterococcus faecalis  - 101.4 fever overnight (12/4-12/5)  - Lactate of 2.5- patient refused IV fluids   - Start Zosyn IVPB 12/6 (pt agreeable)  - RVP negative  - Await blood cultures, CXR  - ID consult and Pulm re-eval (no respiratory symptoms)  - Pulm recs: continue to monitor   - ID recs: CT chest- stable (12/5)   - Repeat Lactate of 3.1 - agreeable to IV fluids- s/p 1000mL bolus   - BL LE doppler- negative     #Cankre sore  - Lidocaine swish and spit TID x3 days     #Knee buckling  - 12/1: S/p guided fall with physical therapy  - Back muscle strain with catching self on RW   - Lidocaine patch to back and BL deltoids  - Ibuprofen 400mg x1 dose     #Experiencing R mandible and occipital numbness, with associated hair loss- outpatient follow up with Rheumatology    #Posttnasal drip--ENT review 11/7, declined scope, continue flonase     #pAFIB/Rt Ij thrombus  - Metoprolol 25mg BID and lovenox  --- Eliquis 5mg BID - tolerating well     #Chronic Tinnitus   - ENT review and recs appreciate, Patient already made ENT appointment for f/u    #Lymphedema both legs -chronic and Rt IJ thrombus  - ACE Wrap BL LEs  - BL LE doppler negative for DVT  - BL UE doppler with chronic thrombotic changes in RIJ     #Transaminitis --LFT Down trending   - Secondary to acute illness and hypotension at [facility]  - Outpatient follow up     #Leucocytosis--steroid related, continue monitoring     #Neuropathy  - Gabapentin 300 mg BID, will consider increasing dose, increase to TID 11/10  --Work on motor strengthening, priority for UE as preferred by patient   - Vit b12 >2,000 in 9/2023  --Will consider Rhuematology f/u if needed    #Sleep/Mood  - Melatonin 6mg at HS  - Psych rec Xanax 0.25mg PRN    #Skin  - Skin: Left lower abdomen ruptured blister 3.0 x 1.0, stage 2 pressure injury to right buttock 4 x1 and left buttock 3x1, stage 2 pressure injury ti right inner thigh 0.2 x 0.2, right groin wound 10.5 x 2.0,   Wound care review, Left groin wound 11/15--- 0.6x1.8.0.1cm, no slough, healthy base  -- MAD to skin folds- Nystatin to submammary and abdominal pannus BID  -- MAD to L groin- cleanse with NS, Triad cream daily  -- Mad to R groin- Nystatin powder daily   -- R groin wound-- aquacel packing, Triad to periwound, Allevyn foam- daily and PRN   - Pressure injury/Skin: OOB to Chair, PT/OT   - Offload pressure, low air loss support surfaces, T&P every 2 hours   --Wound care consult-10/9 -Multiple wounds--perineal and buttock/sacral, recs appreciated   --Suggest Continue treatments as below:   Twice daily & PRN Normal Saline Cleanse of abdominal pannus & breast folds, perineal, Left & Right inner buttock erosions.  Pat thoroughly dry.   Apply Desitin generously twice daily (& PRN) to perineal, buttocks, and left groin erosions, leave open to air.    Apply Nystatin powder to abdominal pannus and breast folds.  Suggest use of Interdry cloths in folds to 'wick' moisture.  Suggest daily Normal Saline Cleanse of right groin surgical wound, pat dry.  Apply Cavillon film barrier to intact periwound skin.  Place Aquacell strip over open area, cover with foam dressing.  - Wound care following     #Pain Mgmt   - Tylenol PRN   - Gabapentin 300mg at HS     #GI/Bowel Mgmt/Hx of alternating bowel movements  - Pantoprazole  - PRN: Maalox   --Lactobacillus BID     #/Bladder Mgmt   -Spontaneously voiding   - UA (11/3) negative  - +UTI- Amoxicillin     #FEN   #Dysphagia  - Diet - Minced & Moist  [CC]    - Dysphagia- SLP evaluation appreciated     #Health maintenance  - Folic Acid   - MVI  - Ocean nasal spray    # Difficulty with access to peripheral veins-- Blood draws from Left arm Medline     #Precautions / PROPHYLAXIS:   - Falls  - ortho: Weight bearing status: WBAT   - Lungs: Aspiration, Incentive Spirometer   - DVT PPX: Eliquis 5 mg BID   ----------------------------------------  11/28 --Labs CBC mild anemia, normal renal function, LFT elevated, downtrending overall unremarkable   ----------------------------------------  Liaison with other providers/agencies    PEER TO PEER with Dr Tripp (patient's insurance company)  * Peer to Peer completed, insurance approval retrospectively given to cover from 11/14 to 11/20, Insurance co awaits updated notes to determine further approval of coverage   SW team to send updated clinical and functional notes to medical insurance company  ----------------------------------------  IDT conference on 12/5  Social Work: Awaiting insurance response on clinicals  SLP: --   OT: Setup for eating/grooming. Sup for UBD. Min A for LBD. Mod A x2 for shower transfer. CG with SB for toilet transfer. Mod A x1 for toileting/hygiene.  PT: Min A for sit to supine. Mod A for supine to sit. Max A for transfers. Min A x1 for stand pivot transfer with RW. Amb 65 ft with bariatric RW and WCF with min A. 6 inch step mod A x2.   RT: Limited participation.   DME: Bariatric RW, WC, drop arm commode  Barriers: New fevers  TDD: 12/13/23 to home.  ----------------------------------------  OUTPATIENT/FOLLOW UP:    Trae Whitney  Pulmonary Disease  [address], [address] [place] [address]  Phone: [phone]  Fax: [phone]  Follow Up Time: 2 weeks    Ryanne Allen  Rheumatology  [address] [address]  Phone: [phone]  Fax: [phone]  Follow Up Time: 1 week    Kyle Pierce  Internal Medicine  [address] [place] [address], [address]  [place] [address]  Phone: [phone]  Fax: [phone]  Follow Up Time: 2 weeks    Addy Powers  Pulmonary Disease  [address] [facility], [facility] [address]  [place], [place] [id number]  Phone: [phone]  Fax: [phone]  Follow Up Time:     Kwame Hawley  Critical Care Medicine  [address] [facility], [facility] [address]  [place] [address]  Phone: [phone]  Fax: [phone]  Follow Up Time:     Neena Borrego  Rheumatology  [address] [facility], [address]  [place] [address]  Phone: [phone]  Fax: [phone]  Follow Up Time:    [facility]  [facility] [address]  Scheduled Appointment: 09/13/2023  2:40 PM

## 2023-12-10 NOTE — PROGRESS NOTE ADULT - SUBJECTIVE AND OBJECTIVE BOX
Cc: Interstitial Lung Disease    HPI: Patient with no new medical issues today.  Pain controlled, no chest pain, no N/V, no Fevers/Chills. No other new ROS  Has been tolerating rehabilitation program.    acetaminophen     Tablet .. 650 milliGRAM(s) Oral every 6 hours PRN  albuterol/ipratropium for Nebulization 3 milliLiter(s) Nebulizer every 6 hours PRN  ALPRAZolam 0.25 milliGRAM(s) Oral every 6 hours PRN  aluminum hydroxide/magnesium hydroxide/simethicone Suspension 30 milliLiter(s) Oral every 4 hours PRN  ammonium lactate 12% Lotion 1 Application(s) Topical every 12 hours PRN  amoxicillin 500 milliGRAM(s) Oral three times a day  apixaban 5 milliGRAM(s) Oral every 12 hours  artificial  tears Solution 1 Drop(s) Both EYES every 12 hours  ascorbic acid 500 milliGRAM(s) Oral daily  atovaquone  Suspension 1500 milliGRAM(s) Oral daily  baclofen 5 milliGRAM(s) Oral every 8 hours PRN  benzocaine/menthol Lozenge 1 Lozenge Oral two times a day PRN  benzonatate 100 milliGRAM(s) Oral three times a day PRN  bisacodyl 5 milliGRAM(s) Oral daily PRN  dextrose 5%. 1000 milliLiter(s) IV Continuous <Continuous>  dextrose 5%. 1000 milliLiter(s) IV Continuous <Continuous>  dextrose 50% Injectable 12.5 Gram(s) IV Push once  dextrose 50% Injectable 25 Gram(s) IV Push once  dextrose 50% Injectable 25 Gram(s) IV Push once  dextrose Oral Gel 15 Gram(s) Oral once PRN  folic acid 1 milliGRAM(s) Oral daily  gabapentin 300 milliGRAM(s) Oral three times a day  glucagon  Injectable 1 milliGRAM(s) IntraMuscular once  hydrocortisone hemorrhoidal Suppository 1 Suppository(s) Rectal two times a day PRN  lactobacillus acidophilus 1 Tablet(s) Oral two times a day with meals  lidocaine   4% Patch 2 Patch Transdermal <User Schedule>  lidocaine   4% Patch 1 Patch Transdermal <User Schedule>  lidocaine 2% Viscous 5 milliLiter(s) Swish and Spit three times a day  loperamide 2 milliGRAM(s) Oral three times a day PRN  melatonin 6 milliGRAM(s) Oral at bedtime  methylPREDNISolone 36 milliGRAM(s) Oral daily  metoprolol tartrate 12.5 milliGRAM(s) Oral two times a day  multivitamin 1 Tablet(s) Oral daily  nystatin Powder 1 Application(s) Topical two times a day  pantoprazole    Tablet 40 milliGRAM(s) Oral before breakfast  polyethylene glycol 3350 17 Gram(s) Oral <User Schedule>  SIMETHICONE SOFTGELS 250 milliGRAM(s),SIMETHICONE SOFTGELS 250 milliGRAM(s) 250 milliGRAM(s) Oral three times a day PRN  sodium chloride 0.65% Nasal 1 Spray(s) Both Nostrils every 8 hours PRN  sodium chloride 0.9% Bolus 1000 milliLiter(s) IV Bolus once  sodium chloride 0.9% lock flush 5 milliLiter(s) IV Push two times a day  sodium chloride 0.9%. 1000 milliLiter(s) IV Continuous <Continuous>  zinc oxide 40% Paste 1 Application(s) Topical three times a day      ICU Vital Signs Last 24 Hrs  T(C): 36.9 (10 Dec 2023 09:30), Max: 36.9 (10 Dec 2023 09:30)  T(F): 98.5 (10 Dec 2023 09:30), Max: 98.5 (10 Dec 2023 09:30)  HR: 83 (10 Dec 2023 09:30) (83 - 83)  BP: 131/76 (10 Dec 2023 09:30) (117/72 - 131/76)  RR: 16 (10 Dec 2023 09:30) (16 - 17)  SpO2: 91% (10 Dec 2023 09:30) (91% - 97%)    O2 Parameters below as of 10 Dec 2023 09:30  Patient On (Oxygen Delivery Method): room air        RADIOLOGY, EKG & ADDITIONAL TESTS: Reviewed.     In NAD  HEENT- EOMI  Heart- Well Perfused  Lungs- No resp distress, no use of accessory resp muscles  Neuro- Exam unchanged  Psych- Affect wnl          Imp: Patient with diagnosis of    Interstitial Lung Disease      admitted for comprehensive acute rehabilitation.    Plan:  - Continue therapies  - DVT prophylaxis  - Skin- Turn q2h, check skin daily  - Continue current medications; patient medically stable.   - Patient is stable to continue current rehabilitation program.   - Pulmonology notes reviewed

## 2023-12-11 LAB
ALBUMIN SERPL ELPH-MCNC: 2.8 G/DL — LOW (ref 3.3–5)
ALBUMIN SERPL ELPH-MCNC: 2.8 G/DL — LOW (ref 3.3–5)
ALP SERPL-CCNC: 119 U/L — SIGNIFICANT CHANGE UP (ref 40–120)
ALP SERPL-CCNC: 119 U/L — SIGNIFICANT CHANGE UP (ref 40–120)
ALT FLD-CCNC: 36 U/L — SIGNIFICANT CHANGE UP (ref 10–45)
ALT FLD-CCNC: 36 U/L — SIGNIFICANT CHANGE UP (ref 10–45)
ANION GAP SERPL CALC-SCNC: 7 MMOL/L — SIGNIFICANT CHANGE UP (ref 5–17)
ANION GAP SERPL CALC-SCNC: 7 MMOL/L — SIGNIFICANT CHANGE UP (ref 5–17)
AST SERPL-CCNC: 16 U/L — SIGNIFICANT CHANGE UP (ref 10–40)
AST SERPL-CCNC: 16 U/L — SIGNIFICANT CHANGE UP (ref 10–40)
BASOPHILS # BLD AUTO: 0.03 K/UL — SIGNIFICANT CHANGE UP (ref 0–0.2)
BASOPHILS # BLD AUTO: 0.03 K/UL — SIGNIFICANT CHANGE UP (ref 0–0.2)
BASOPHILS NFR BLD AUTO: 0.4 % — SIGNIFICANT CHANGE UP (ref 0–2)
BASOPHILS NFR BLD AUTO: 0.4 % — SIGNIFICANT CHANGE UP (ref 0–2)
BILIRUB SERPL-MCNC: 0.6 MG/DL — SIGNIFICANT CHANGE UP (ref 0.2–1.2)
BILIRUB SERPL-MCNC: 0.6 MG/DL — SIGNIFICANT CHANGE UP (ref 0.2–1.2)
BUN SERPL-MCNC: 19 MG/DL — SIGNIFICANT CHANGE UP (ref 7–23)
BUN SERPL-MCNC: 19 MG/DL — SIGNIFICANT CHANGE UP (ref 7–23)
CALCIUM SERPL-MCNC: 9.7 MG/DL — SIGNIFICANT CHANGE UP (ref 8.4–10.5)
CALCIUM SERPL-MCNC: 9.7 MG/DL — SIGNIFICANT CHANGE UP (ref 8.4–10.5)
CHLORIDE SERPL-SCNC: 104 MMOL/L — SIGNIFICANT CHANGE UP (ref 96–108)
CHLORIDE SERPL-SCNC: 104 MMOL/L — SIGNIFICANT CHANGE UP (ref 96–108)
CO2 SERPL-SCNC: 27 MMOL/L — SIGNIFICANT CHANGE UP (ref 22–31)
CO2 SERPL-SCNC: 27 MMOL/L — SIGNIFICANT CHANGE UP (ref 22–31)
CREAT SERPL-MCNC: 0.7 MG/DL — SIGNIFICANT CHANGE UP (ref 0.5–1.3)
CREAT SERPL-MCNC: 0.7 MG/DL — SIGNIFICANT CHANGE UP (ref 0.5–1.3)
EGFR: 96 ML/MIN/1.73M2 — SIGNIFICANT CHANGE UP
EGFR: 96 ML/MIN/1.73M2 — SIGNIFICANT CHANGE UP
EOSINOPHIL # BLD AUTO: 0.01 K/UL — SIGNIFICANT CHANGE UP (ref 0–0.5)
EOSINOPHIL # BLD AUTO: 0.01 K/UL — SIGNIFICANT CHANGE UP (ref 0–0.5)
EOSINOPHIL NFR BLD AUTO: 0.1 % — SIGNIFICANT CHANGE UP (ref 0–6)
EOSINOPHIL NFR BLD AUTO: 0.1 % — SIGNIFICANT CHANGE UP (ref 0–6)
GLUCOSE SERPL-MCNC: 130 MG/DL — HIGH (ref 70–99)
GLUCOSE SERPL-MCNC: 130 MG/DL — HIGH (ref 70–99)
HCT VFR BLD CALC: 35.6 % — SIGNIFICANT CHANGE UP (ref 34.5–45)
HCT VFR BLD CALC: 35.6 % — SIGNIFICANT CHANGE UP (ref 34.5–45)
HGB BLD-MCNC: 10.9 G/DL — LOW (ref 11.5–15.5)
HGB BLD-MCNC: 10.9 G/DL — LOW (ref 11.5–15.5)
IMM GRANULOCYTES NFR BLD AUTO: 1.2 % — HIGH (ref 0–0.9)
IMM GRANULOCYTES NFR BLD AUTO: 1.2 % — HIGH (ref 0–0.9)
LACTATE SERPL-SCNC: 4.2 MMOL/L — CRITICAL HIGH (ref 0.7–2)
LACTATE SERPL-SCNC: 4.2 MMOL/L — CRITICAL HIGH (ref 0.7–2)
LYMPHOCYTES # BLD AUTO: 1.36 K/UL — SIGNIFICANT CHANGE UP (ref 1–3.3)
LYMPHOCYTES # BLD AUTO: 1.36 K/UL — SIGNIFICANT CHANGE UP (ref 1–3.3)
LYMPHOCYTES # BLD AUTO: 18.2 % — SIGNIFICANT CHANGE UP (ref 13–44)
LYMPHOCYTES # BLD AUTO: 18.2 % — SIGNIFICANT CHANGE UP (ref 13–44)
MCHC RBC-ENTMCNC: 26.1 PG — LOW (ref 27–34)
MCHC RBC-ENTMCNC: 26.1 PG — LOW (ref 27–34)
MCHC RBC-ENTMCNC: 30.6 GM/DL — LOW (ref 32–36)
MCHC RBC-ENTMCNC: 30.6 GM/DL — LOW (ref 32–36)
MCV RBC AUTO: 85.2 FL — SIGNIFICANT CHANGE UP (ref 80–100)
MCV RBC AUTO: 85.2 FL — SIGNIFICANT CHANGE UP (ref 80–100)
MONOCYTES # BLD AUTO: 0.75 K/UL — SIGNIFICANT CHANGE UP (ref 0–0.9)
MONOCYTES # BLD AUTO: 0.75 K/UL — SIGNIFICANT CHANGE UP (ref 0–0.9)
MONOCYTES NFR BLD AUTO: 10 % — SIGNIFICANT CHANGE UP (ref 2–14)
MONOCYTES NFR BLD AUTO: 10 % — SIGNIFICANT CHANGE UP (ref 2–14)
NEUTROPHILS # BLD AUTO: 5.25 K/UL — SIGNIFICANT CHANGE UP (ref 1.8–7.4)
NEUTROPHILS # BLD AUTO: 5.25 K/UL — SIGNIFICANT CHANGE UP (ref 1.8–7.4)
NEUTROPHILS NFR BLD AUTO: 70.1 % — SIGNIFICANT CHANGE UP (ref 43–77)
NEUTROPHILS NFR BLD AUTO: 70.1 % — SIGNIFICANT CHANGE UP (ref 43–77)
NRBC # BLD: 0 /100 WBCS — SIGNIFICANT CHANGE UP (ref 0–0)
NRBC # BLD: 0 /100 WBCS — SIGNIFICANT CHANGE UP (ref 0–0)
PLATELET # BLD AUTO: 325 K/UL — SIGNIFICANT CHANGE UP (ref 150–400)
PLATELET # BLD AUTO: 325 K/UL — SIGNIFICANT CHANGE UP (ref 150–400)
POTASSIUM SERPL-MCNC: 4.6 MMOL/L — SIGNIFICANT CHANGE UP (ref 3.5–5.3)
POTASSIUM SERPL-MCNC: 4.6 MMOL/L — SIGNIFICANT CHANGE UP (ref 3.5–5.3)
POTASSIUM SERPL-SCNC: 4.6 MMOL/L — SIGNIFICANT CHANGE UP (ref 3.5–5.3)
POTASSIUM SERPL-SCNC: 4.6 MMOL/L — SIGNIFICANT CHANGE UP (ref 3.5–5.3)
PROT SERPL-MCNC: 5.6 G/DL — LOW (ref 6–8.3)
PROT SERPL-MCNC: 5.6 G/DL — LOW (ref 6–8.3)
RBC # BLD: 4.18 M/UL — SIGNIFICANT CHANGE UP (ref 3.8–5.2)
RBC # BLD: 4.18 M/UL — SIGNIFICANT CHANGE UP (ref 3.8–5.2)
RBC # FLD: 15.4 % — HIGH (ref 10.3–14.5)
RBC # FLD: 15.4 % — HIGH (ref 10.3–14.5)
SODIUM SERPL-SCNC: 138 MMOL/L — SIGNIFICANT CHANGE UP (ref 135–145)
SODIUM SERPL-SCNC: 138 MMOL/L — SIGNIFICANT CHANGE UP (ref 135–145)
WBC # BLD: 7.49 K/UL — SIGNIFICANT CHANGE UP (ref 3.8–10.5)
WBC # BLD: 7.49 K/UL — SIGNIFICANT CHANGE UP (ref 3.8–10.5)
WBC # FLD AUTO: 7.49 K/UL — SIGNIFICANT CHANGE UP (ref 3.8–10.5)
WBC # FLD AUTO: 7.49 K/UL — SIGNIFICANT CHANGE UP (ref 3.8–10.5)

## 2023-12-11 PROCEDURE — 99233 SBSQ HOSP IP/OBS HIGH 50: CPT

## 2023-12-11 PROCEDURE — 93971 EXTREMITY STUDY: CPT | Mod: 26,RT

## 2023-12-11 RX ADMIN — Medication 12.5 MILLIGRAM(S): at 21:05

## 2023-12-11 RX ADMIN — LIDOCAINE 2 PATCH: 4 CREAM TOPICAL at 08:02

## 2023-12-11 RX ADMIN — Medication 500 MILLIGRAM(S): at 08:04

## 2023-12-11 RX ADMIN — Medication 500 MILLIGRAM(S): at 21:08

## 2023-12-11 RX ADMIN — LIDOCAINE 1 PATCH: 4 CREAM TOPICAL at 08:03

## 2023-12-11 RX ADMIN — Medication 1 MILLIGRAM(S): at 21:06

## 2023-12-11 RX ADMIN — Medication 500 MILLIGRAM(S): at 21:06

## 2023-12-11 RX ADMIN — Medication 1 TABLET(S): at 08:04

## 2023-12-11 RX ADMIN — GABAPENTIN 300 MILLIGRAM(S): 400 CAPSULE ORAL at 14:56

## 2023-12-11 RX ADMIN — PANTOPRAZOLE SODIUM 40 MILLIGRAM(S): 20 TABLET, DELAYED RELEASE ORAL at 08:03

## 2023-12-11 RX ADMIN — GABAPENTIN 300 MILLIGRAM(S): 400 CAPSULE ORAL at 21:11

## 2023-12-11 RX ADMIN — Medication 36 MILLIGRAM(S): at 08:04

## 2023-12-11 RX ADMIN — Medication 1 TABLET(S): at 21:06

## 2023-12-11 RX ADMIN — Medication 12.5 MILLIGRAM(S): at 08:08

## 2023-12-11 RX ADMIN — GABAPENTIN 300 MILLIGRAM(S): 400 CAPSULE ORAL at 08:03

## 2023-12-11 RX ADMIN — Medication 5 MILLIGRAM(S): at 21:05

## 2023-12-11 RX ADMIN — APIXABAN 5 MILLIGRAM(S): 2.5 TABLET, FILM COATED ORAL at 21:05

## 2023-12-11 RX ADMIN — Medication 6 MILLIGRAM(S): at 21:11

## 2023-12-11 RX ADMIN — Medication 500 MILLIGRAM(S): at 14:56

## 2023-12-11 RX ADMIN — APIXABAN 5 MILLIGRAM(S): 2.5 TABLET, FILM COATED ORAL at 08:03

## 2023-12-11 RX ADMIN — ATOVAQUONE 1500 MILLIGRAM(S): 750 SUSPENSION ORAL at 21:05

## 2023-12-11 RX ADMIN — Medication 5 MILLIGRAM(S): at 14:59

## 2023-12-11 NOTE — CHART NOTE - NSCHARTNOTEFT_GEN_A_CORE
Nutrition Follow Up Note  Hospital Course   (Per Electronic Medical Record)    Source: Medical Record [] Patient [ ] Family [x] pt's partner provided info         Diet: Regular  Pt tolerating diet with good PO intake, eating >75% of meals per nursing flow sheets. No reported N/V/C/D, last BM 12/10 Per nursing flowsheets. Continue to hold Glucerna until patient request to add back into diet.     Current Weight: 256.6lb on 10/5  Recommend obtaining current weights    Pertinent Medications: MEDICATIONS  (STANDING):  amoxicillin 500 milliGRAM(s) Oral three times a day  apixaban 5 milliGRAM(s) Oral every 12 hours  artificial  tears Solution 1 Drop(s) Both EYES every 12 hours  ascorbic acid 500 milliGRAM(s) Oral daily  atovaquone  Suspension 1500 milliGRAM(s) Oral daily  dextrose 5%. 1000 milliLiter(s) (100 mL/Hr) IV Continuous <Continuous>  dextrose 5%. 1000 milliLiter(s) (50 mL/Hr) IV Continuous <Continuous>  dextrose 50% Injectable 12.5 Gram(s) IV Push once  dextrose 50% Injectable 25 Gram(s) IV Push once  dextrose 50% Injectable 25 Gram(s) IV Push once  folic acid 1 milliGRAM(s) Oral daily  gabapentin 300 milliGRAM(s) Oral three times a day  glucagon  Injectable 1 milliGRAM(s) IntraMuscular once  lactobacillus acidophilus 1 Tablet(s) Oral two times a day with meals  lidocaine   4% Patch 2 Patch Transdermal <User Schedule>  lidocaine   4% Patch 1 Patch Transdermal <User Schedule>  melatonin 6 milliGRAM(s) Oral at bedtime  methylPREDNISolone 36 milliGRAM(s) Oral daily  metoprolol tartrate 12.5 milliGRAM(s) Oral two times a day  multivitamin 1 Tablet(s) Oral daily  nystatin Powder 1 Application(s) Topical two times a day  pantoprazole    Tablet 40 milliGRAM(s) Oral before breakfast  polyethylene glycol 3350 17 Gram(s) Oral <User Schedule>  sodium chloride 0.9% Bolus 1000 milliLiter(s) IV Bolus once  sodium chloride 0.9% lock flush 5 milliLiter(s) IV Push two times a day  sodium chloride 0.9%. 1000 milliLiter(s) (100 mL/Hr) IV Continuous <Continuous>  zinc oxide 40% Paste 1 Application(s) Topical three times a day    MEDICATIONS  (PRN):  acetaminophen     Tablet .. 650 milliGRAM(s) Oral every 6 hours PRN Temp greater or equal to 38C (100.4F), Mild Pain (1 - 3), Moderate Pain (4 - 6)  albuterol/ipratropium for Nebulization 3 milliLiter(s) Nebulizer every 6 hours PRN Shortness of Breath and/or Wheezing  ALPRAZolam 0.25 milliGRAM(s) Oral every 6 hours PRN Anxiety  aluminum hydroxide/magnesium hydroxide/simethicone Suspension 30 milliLiter(s) Oral every 4 hours PRN Dyspepsia  ammonium lactate 12% Lotion 1 Application(s) Topical every 12 hours PRN BL feet dryness  baclofen 5 milliGRAM(s) Oral every 8 hours PRN Musculoskeletal Pain  benzocaine/menthol Lozenge 1 Lozenge Oral two times a day PRN Sore Throat  benzonatate 100 milliGRAM(s) Oral three times a day PRN Cough  bisacodyl 5 milliGRAM(s) Oral daily PRN Constipation  dextrose Oral Gel 15 Gram(s) Oral once PRN Blood Glucose LESS THAN 70 milliGRAM(s)/deciliter  hydrocortisone hemorrhoidal Suppository 1 Suppository(s) Rectal two times a day PRN Hemorrhoids  loperamide 2 milliGRAM(s) Oral three times a day PRN Diarrhea  SIMETHICONE SOFTGELS 250 milliGRAM(s),SIMETHICONE SOFTGELS 250 milliGRAM(s) 250 milliGRAM(s) Oral three times a day PRN for gas  sodium chloride 0.65% Nasal 1 Spray(s) Both Nostrils every 8 hours PRN Nasal Congestion      Pertinent Labs:  12-11 Na138 mmol/L Glu 130 mg/dL<H> K+ 4.6 mmol/L Cr  0.70 mg/dL BUN 19 mg/dL 12-11 Alb 2.8 g/dL<L>      Skin: No pressure injury per nursing flowsheets    Edema: +1 right foot, left foot, +2 right ankle left ankle    Last Bowel Movement: on 12/10 Per nursing flowsheets       Estimated Needs:   [X] No Change Since Previous Assessment    Previous Nutrition Diagnosis:   Increased nutrient needs    Nutrition Diagnosis is [X] Ongoing  - continues on MVI, vitamin C.   Will add Glucerna supplement (provides 220 kcal, 10 g protein) back to diet order at next visit if desired.    New Nutrition Diagnosis: [X] Not Applicable    Interventions:   1. Recommend continuing with current plan of care, diet consistency per SLP    Monitoring & Evaluation:   [X] Weights   [X] PO Intake   [X] Skin Integrity   [X] Follow Up (Per Protocol)  [X] Tolerance to Diet Prescription   [X] Other: Labs    Registered Dietitian/Nutritionist Remains Available.  Brissa Sepulveda RD    Phone# [phone] Nutrition Follow Up Note  Hospital Course   (Per Electronic Medical Record)    Source: Medical Record [] Patient [ ] Family [x] pt's partner provided info         Diet: Regular  Pt tolerating diet with good PO intake, eating >75% of meals per nursing flow sheets. No reported N/V/C/D, last BM 12/10 Per nursing flowsheets. Continue to hold Glucerna until patient request to add back into diet.     Current Weight: 256.6lb on 10/5  Recommend obtaining current weights    Pertinent Medications: MEDICATIONS  (STANDING):  amoxicillin 500 milliGRAM(s) Oral three times a day  apixaban 5 milliGRAM(s) Oral every 12 hours  artificial  tears Solution 1 Drop(s) Both EYES every 12 hours  ascorbic acid 500 milliGRAM(s) Oral daily  atovaquone  Suspension 1500 milliGRAM(s) Oral daily  dextrose 5%. 1000 milliLiter(s) (100 mL/Hr) IV Continuous <Continuous>  dextrose 5%. 1000 milliLiter(s) (50 mL/Hr) IV Continuous <Continuous>  dextrose 50% Injectable 12.5 Gram(s) IV Push once  dextrose 50% Injectable 25 Gram(s) IV Push once  dextrose 50% Injectable 25 Gram(s) IV Push once  folic acid 1 milliGRAM(s) Oral daily  gabapentin 300 milliGRAM(s) Oral three times a day  glucagon  Injectable 1 milliGRAM(s) IntraMuscular once  lactobacillus acidophilus 1 Tablet(s) Oral two times a day with meals  lidocaine   4% Patch 2 Patch Transdermal <User Schedule>  lidocaine   4% Patch 1 Patch Transdermal <User Schedule>  melatonin 6 milliGRAM(s) Oral at bedtime  methylPREDNISolone 36 milliGRAM(s) Oral daily  metoprolol tartrate 12.5 milliGRAM(s) Oral two times a day  multivitamin 1 Tablet(s) Oral daily  nystatin Powder 1 Application(s) Topical two times a day  pantoprazole    Tablet 40 milliGRAM(s) Oral before breakfast  polyethylene glycol 3350 17 Gram(s) Oral <User Schedule>  sodium chloride 0.9% Bolus 1000 milliLiter(s) IV Bolus once  sodium chloride 0.9% lock flush 5 milliLiter(s) IV Push two times a day  sodium chloride 0.9%. 1000 milliLiter(s) (100 mL/Hr) IV Continuous <Continuous>  zinc oxide 40% Paste 1 Application(s) Topical three times a day    MEDICATIONS  (PRN):  acetaminophen     Tablet .. 650 milliGRAM(s) Oral every 6 hours PRN Temp greater or equal to 38C (100.4F), Mild Pain (1 - 3), Moderate Pain (4 - 6)  albuterol/ipratropium for Nebulization 3 milliLiter(s) Nebulizer every 6 hours PRN Shortness of Breath and/or Wheezing  ALPRAZolam 0.25 milliGRAM(s) Oral every 6 hours PRN Anxiety  aluminum hydroxide/magnesium hydroxide/simethicone Suspension 30 milliLiter(s) Oral every 4 hours PRN Dyspepsia  ammonium lactate 12% Lotion 1 Application(s) Topical every 12 hours PRN BL feet dryness  baclofen 5 milliGRAM(s) Oral every 8 hours PRN Musculoskeletal Pain  benzocaine/menthol Lozenge 1 Lozenge Oral two times a day PRN Sore Throat  benzonatate 100 milliGRAM(s) Oral three times a day PRN Cough  bisacodyl 5 milliGRAM(s) Oral daily PRN Constipation  dextrose Oral Gel 15 Gram(s) Oral once PRN Blood Glucose LESS THAN 70 milliGRAM(s)/deciliter  hydrocortisone hemorrhoidal Suppository 1 Suppository(s) Rectal two times a day PRN Hemorrhoids  loperamide 2 milliGRAM(s) Oral three times a day PRN Diarrhea  SIMETHICONE SOFTGELS 250 milliGRAM(s),SIMETHICONE SOFTGELS 250 milliGRAM(s) 250 milliGRAM(s) Oral three times a day PRN for gas  sodium chloride 0.65% Nasal 1 Spray(s) Both Nostrils every 8 hours PRN Nasal Congestion      Pertinent Labs:  12-11 Na138 mmol/L Glu 130 mg/dL<H> K+ 4.6 mmol/L Cr  0.70 mg/dL BUN 19 mg/dL 12-11 Alb 2.8 g/dL<L>      Skin: No pressure injury per nursing flowsheets    Edema: +1 right foot, left foot, +2 right ankle left ankle    Last Bowel Movement: on 12/10 Per nursing flowsheets       Estimated Needs:   [X] No Change Since Previous Assessment    Previous Nutrition Diagnosis:   Increased nutrient needs    Nutrition Diagnosis is [X] Ongoing  - continues on MVI, vitamin C.   Will add Glucerna supplement (provides 220 kcal, 10 g protein) back to diet order at next visit if desired.    New Nutrition Diagnosis: [X] Not Applicable    Interventions:   1. Recommend continuing with current plan of care, diet consistency per SLP    Monitoring & Evaluation:   [X] Weights   [X] PO Intake   [X] Skin Integrity   [X] Follow Up (Per Protocol)  [X] Tolerance to Diet Prescription   [X] Other: Labs    Registered Dietitian/Nutritionist Remains Available.  Brissa Sepulveda RD    Phone# [phone]

## 2023-12-11 NOTE — PROGRESS NOTE ADULT - NSPROGADDITIONALINFOA_GEN_ALL_CORE
Spent 36 minutes on face-face visit, evaluate, examine and treat Spent 62 mins, patient review, discussion of lab results, care co  ordination, observation in therapy

## 2023-12-11 NOTE — PROGRESS NOTE ADULT - SUBJECTIVE AND OBJECTIVE BOX
SUBJECTIVE/ROS:  Patient seen and examined at bedside this AM. Partner present.  Reports good night rest  Tolerating oral diet  Reports discomfort with her BP measurement using both legs, has IV on left arm and DVT Rt arm  Infomed her that a venous duplex for Rt arm will be done to check for resolution of clot, noted 10/6  However, partner notes that patient is often hesitant practising transfers bed to commode for bowel and bladder care at night, despite improved motor function  Patient agrees to work on this  She is happy with sustained functional improvement       ROS--No chest or abd pain, no palpitations nausea or vomiting, LBM 12/10    Therapy  Improvement with ambulatory distance as noted  Will work on consistency with transfers   Min A for sit to supine transfers (improved from Mod A x2 persons perviously)  Ambulating 65ft with bariatric RW (improved from 15 ft previously)  Supervision for upper body dressing  Sustained improvement with slide board transfer bed to   Sustained improvement of upper extremity function and strength    ICU Vital Signs Last 24 Hrs  T(C): 36.3 (11 Dec 2023 07:57), Max: 36.5 (10 Dec 2023 21:05)  T(F): 97.4 (11 Dec 2023 07:57), Max: 97.7 (10 Dec 2023 21:05)  HR: 70 (11 Dec 2023 07:57) (70 - 76)  BP: 140/82 (11 Dec 2023 07:57) (127/74 - 140/82)  RR: 16 (11 Dec 2023 07:57) (16 - 16)  SpO2: 96% (11 Dec 2023 07:57) (96% - 96%)  O2 Parameters below as of 11 Dec 2023 07:57  Patient On (Oxygen Delivery Method): nasal cannula  O2 Flow (L/min): 1    PHYSICAL EXAM:  Gen -  Comfortable,  at bedside -normal oxy sat and subsequently self ambulating WC functional distance  Pulm - clear  Cardiovascular - HS i and II intermittently irregular  Abdomen - Soft, non tender,   Extremities - edema to b/l LE, no calf tenderness, LUE Med line    Neuro-  Cognitive - awake, alert, fully oriented,  Light tough sensation diffusely reduced on fingers and dorsal foot, and over right lower jaw, localized area approx 2 cm, no skin changes noted, non progressive   Motor -                    LEFT    UE - 4/5                    RIGHT UE - 4/5                     LEFT    LE - 3+/5, distally and 3/5 proximal                    RIGHT LE - Ankles PF 3/5 ,DF 2/5, Knee ext 3/5 Hips limited by pain Rt hip due to ulcer at ECHO access site   Sensory - decreased light tough sensation fingers and sole of foot, difusely  MSK: improvement with motor strength  Psychiatric - Mood stable, Affect WNL  Skin -- , stage 2 pressure injury to right buttock 4 x1 and left buttock 3x1, stage 2 pressure injury ti right inner thigh 0.2 x 0.2, right groin wound 10.5 x 2.0, Left groin wound 0.6x1.8.0.1cm  Mild discomfort on pressure over Rt inguinal ligament, but no swelling or erythema  MMT--Able to contract B/L upper back muscles, EF/EE 4/5 distally, knee Est 3+/5        RECENT LABS/IMAGING                        10.9   7.49  )-----------( 325      ( 11 Dec 2023 08:51 )             35.6     12-11    138  |  104  |  19  ----------------------------<  130<H>  4.6   |  27  |  0.70    Ca    9.7      11 Dec 2023 08:51    TPro  5.6<L>  /  Alb  2.8<L>  /  TBili  0.6  /  DBili  x   /  AST  16  /  ALT  36  /  AlkPhos  119  12-11      Urinalysis Basic - ( 11 Dec 2023 08:51 )    Color: x / Appearance: x / SG: x / pH: x  Gluc: 130 mg/dL / Ketone: x  / Bili: x / Urobili: x   Blood: x / Protein: x / Nitrite: x   Leuk Esterase: x / RBC: x / WBC x   Sq Epi: x / Non Sq Epi: x / Bacteria: x    MEDICATIONS  (STANDING):  amoxicillin 500 milliGRAM(s) Oral three times a day  apixaban 5 milliGRAM(s) Oral every 12 hours  artificial  tears Solution 1 Drop(s) Both EYES every 12 hours  ascorbic acid 500 milliGRAM(s) Oral daily  atovaquone  Suspension 1500 milliGRAM(s) Oral daily  dextrose 5%. 1000 milliLiter(s) (100 mL/Hr) IV Continuous <Continuous>  dextrose 5%. 1000 milliLiter(s) (50 mL/Hr) IV Continuous <Continuous>  dextrose 50% Injectable 12.5 Gram(s) IV Push once  dextrose 50% Injectable 25 Gram(s) IV Push once  dextrose 50% Injectable 25 Gram(s) IV Push once  folic acid 1 milliGRAM(s) Oral daily  gabapentin 300 milliGRAM(s) Oral three times a day  glucagon  Injectable 1 milliGRAM(s) IntraMuscular once  lactobacillus acidophilus 1 Tablet(s) Oral two times a day with meals  lidocaine   4% Patch 2 Patch Transdermal <User Schedule>  lidocaine   4% Patch 1 Patch Transdermal <User Schedule>  melatonin 6 milliGRAM(s) Oral at bedtime  methylPREDNISolone 36 milliGRAM(s) Oral daily  metoprolol tartrate 12.5 milliGRAM(s) Oral two times a day  multivitamin 1 Tablet(s) Oral daily  nystatin Powder 1 Application(s) Topical two times a day  pantoprazole    Tablet 40 milliGRAM(s) Oral before breakfast  polyethylene glycol 3350 17 Gram(s) Oral <User Schedule>  sodium chloride 0.9% Bolus 1000 milliLiter(s) IV Bolus once  sodium chloride 0.9% lock flush 5 milliLiter(s) IV Push two times a day  sodium chloride 0.9%. 1000 milliLiter(s) (100 mL/Hr) IV Continuous <Continuous>  zinc oxide 40% Paste 1 Application(s) Topical three times a day    MEDICATIONS  (PRN):  acetaminophen     Tablet .. 650 milliGRAM(s) Oral every 6 hours PRN Temp greater or equal to 38C (100.4F), Mild Pain (1 - 3), Moderate Pain (4 - 6)  albuterol/ipratropium for Nebulization 3 milliLiter(s) Nebulizer every 6 hours PRN Shortness of Breath and/or Wheezing  ALPRAZolam 0.25 milliGRAM(s) Oral every 6 hours PRN Anxiety  aluminum hydroxide/magnesium hydroxide/simethicone Suspension 30 milliLiter(s) Oral every 4 hours PRN Dyspepsia  ammonium lactate 12% Lotion 1 Application(s) Topical every 12 hours PRN BL feet dryness  baclofen 5 milliGRAM(s) Oral every 8 hours PRN Musculoskeletal Pain  benzocaine/menthol Lozenge 1 Lozenge Oral two times a day PRN Sore Throat  benzonatate 100 milliGRAM(s) Oral three times a day PRN Cough  bisacodyl 5 milliGRAM(s) Oral daily PRN Constipation  dextrose Oral Gel 15 Gram(s) Oral once PRN Blood Glucose LESS THAN 70 milliGRAM(s)/deciliter  hydrocortisone hemorrhoidal Suppository 1 Suppository(s) Rectal two times a day PRN Hemorrhoids  loperamide 2 milliGRAM(s) Oral three times a day PRN Diarrhea  SIMETHICONE SOFTGELS 250 milliGRAM(s),SIMETHICONE SOFTGELS 250 milliGRAM(s) 250 milliGRAM(s) Oral three times a day PRN for gas  sodium chloride 0.65% Nasal 1 Spray(s) Both Nostrils every 8 hours PRN Nasal Congestion

## 2023-12-11 NOTE — PROGRESS NOTE ADULT - ASSESSMENT
Assessment/Plan:  ALIZA FOLEY is a 62 year old female with PMH of pAFIB and sciatica; who presented to [facility] ED with SOB, and was found to have groundglass opacities and interstitial lung disease. She was treated with empiric Vancomycin and Zosyn. She underwent a bronchoscopy with bronchoalveolar lavage and pulse steroids. She was given IV diuretics for increased pulmonary congestion, but her respiratory status continued to worsen.  On 9/13, she was transferred to [facility] for ECMO requirements. She was further treated with Plasmapheresis, Rituximab and high-dose steroids, with significant improvement. Her overall hospital course was complicated by thrombocytopenia (s/p several platelet transfusions), leukocytosis (infectious workup entirely negative- s/p Vanco and Ceftriaxone), AFIB with RVR (resolved with Metoprolol), dysphagia (requiring NGT), transaminitis (secondary to acute illness and hypotension),  non-occlusive RIJ DVT (started on Lovenox). Patient now admitted for a multidisciplinary rehab program. 10-05-23 @ 13:18    * Sustained functional improvement including progress with Wt bearing LE and improvement with ability at transfers, concentrate on prox LE muscle strengthening and endurance exercises and transfers  * Making further progress, increased ambulatory distance with walker up to 80 ft  * UTI - Amoxicillin TID, per ID for UTI  * Cankre sore- Lidocaine swish and spit TID x3 days   * Labs discusseed--unremarkable  * RUE venous duplex check for resolution of know DVT     #Interstitial Lung Disease and Mixed connective tissue disease  - Gait Instability, ADL impairments and Functional impairments: start Comprehensive Rehab Program of PT/OT/SLP - 3 hours a day, 5 days a week  - P&O as needed   - Non-specific Interstitial Lung Disease  - Requiring ECMO   - Improvement s/p Plasmapheresis, Rituximab and high- dose steroids   - Albuterol/Ipratropium Nebulizer every 6 hours  - Mepron 1500mg daily  - PO Medrol ( due to liver dysfunction): Plan will be to taper by ~20% every 2 weeks    Medrol   64mg x 2 weeks   56mg x 2 weeks  44mg x 2 weeks   36mg x 2 weeks - Follow up Outpatient   - Plan to wean 02 as tolerated, Continue tapering oxygen,  -  CT Chest noted 11/10---Resp f/u review  11/14, appreciated  They reports sustained improvement, clinical (normal oxy sats on R/A, sustained progress with oxy tapering), and radiological (no acute findings on recent CT Chest, rather residual chronic infiltrative diesease noted, with recommendation to repeat CT after Acute rehab treatment     #Fever  - Recent UTI on Bactrim (since DCd)  - UCX with enterococcus faecalis  - 101.4 fever overnight (12/4-12/5)  - Lactate of 2.5- patient refused IV fluids   - Start Zosyn IVPB 12/6 (pt agreeable)  - RVP negative  - Await blood cultures, CXR  - ID consult and Pulm re-eval (no respiratory symptoms)  - Pulm recs: continue to monitor   - ID recs: CT chest- stable (12/5)   - Repeat Lactate of 3.1 - agreeable to IV fluids- s/p 1000mL bolus   - BL LE doppler- negative   --UTI--On Amoxyl    #Cankre sore  - Lidocaine swish and spit TID x3 days     #Knee buckling  - 12/1: S/p guided fall with physical therapy  - Back muscle strain with catching self on RW   - Lidocaine patch to back and BL deltoids  - Ibuprofen 400mg x1 dose     #Experiencing R mandible and occipital numbness, with associated hair loss- outpatient follow up with Rheumatology    #Posttnasal drip--ENT review 11/7, declined scope, continue flonase     #pAFIB/Rt Ij thrombus  - Metoprolol 25mg BID and lovenox  ---Eliquis 5mg BID - tolerating well   -- RUE venous duplex check for resolution of know DVT    #Chronic Tinnitus   - ENT review and recs appreciate, Patient already made ENT appointment for f/u    #Lymphedema both legs -chronic and Rt IJ thrombus  - ACE Wrap BL LEs  - BL LE doppler negative for DVT  - BL UE doppler with chronic thrombotic changes in RIJ     #Transaminitis --LFT Down trending   - Secondary to acute illness and hypotension at [facility]  - Outpatient follow up     #Leucocytosis--steroid related, continue monitoring     #Neuropathy  - Gabapentin 300 mg BID, will consider increasing dose, increase to TID 11/10  --Work on motor strengthening, priority for UE as preferred by patient   - Vit b12 >2,000 in 9/2023  --Will consider Rhuematology f/u if needed    #Sleep/Mood  - Melatonin 6mg at HS  - Psych rec Xanax 0.25mg PRN    #Skin  - Skin: Left lower abdomen ruptured blister 3.0 x 1.0, stage 2 pressure injury to right buttock 4 x1 and left buttock 3x1, stage 2 pressure injury ti right inner thigh 0.2 x 0.2, right groin wound 10.5 x 2.0,   Wound care review, Left groin wound 11/15--- 0.6x1.8.0.1cm, no slough, healthy base  -- MAD to skin folds- Nystatin to submammary and abdominal pannus BID  -- MAD to L groin- cleanse with NS, Triad cream daily  -- Mad to R groin- Nystatin powder daily   -- R groin wound-- aquacel packing, Triad to periwound, Allevyn foam- daily and PRN   - Pressure injury/Skin: OOB to Chair, PT/OT   - Offload pressure, low air loss support surfaces, T&P every 2 hours   --Wound care consult-10/9 -Multiple wounds--perineal and buttock/sacral, recs appreciated   --Suggest Continue treatments as below:   Twice daily & PRN Normal Saline Cleanse of abdominal pannus & breast folds, perineal, Left & Right inner buttock erosions.  Pat thoroughly dry.   Apply Desitin generously twice daily (& PRN) to perineal, buttocks, and left groin erosions, leave open to air.    Apply Nystatin powder to abdominal pannus and breast folds.  Suggest use of Interdry cloths in folds to 'wick' moisture.  Suggest daily Normal Saline Cleanse of right groin surgical wound, pat dry.  Apply Cavillon film barrier to intact periwound skin.  Place Aquacell strip over open area, cover with foam dressing.  - Wound care following     #Pain Mgmt   - Tylenol PRN   - Gabapentin 300mg at HS     #GI/Bowel Mgmt/Hx of alternating bowel movements  - Pantoprazole  - PRN: Maalox   --Lactobacillus BID     #/Bladder Mgmt   -Spontaneously voiding   - UA (11/3) negative  - +UTI- Amoxicillin     #FEN   #Dysphagia  - Diet - Minced & Moist  [CC]    - Dysphagia- SLP evaluation appreciated     #Health maintenance  - Folic Acid   - MVI  - Ocean nasal spray    # Difficulty with access to peripheral veins-- Blood draws from Left arm Medline --RUE F/U venous duplex 12/11    #Precautions / PROPHYLAXIS:   - Falls  - ortho: Weight bearing status: WBAT   - Lungs: Aspiration, Incentive Spirometer   - DVT PPX: Eliquis 5 mg BID   ----------------------------------------  12/11- --Labs CBC mild anemia, normal renal function, LFT elevated, downtrending overall unremarkable   ----------------------------------------  Liaison with other providers/agencies    PEER TO PEER with Dr Tripp (patient's insurance company)  * Peer to Peer completed, insurance approval retrospectively given to cover from 11/14 to 11/20, Insurance co awaits updated notes to determine further approval of coverage   SW team to send updated clinical and functional notes to medical insurance company  ----------------------------------------  IDT conference on 12/5  Social Work: Awaiting insurance response on clinicals  SLP: -- n/a  OT: Setup for eating/grooming. Sup for UBD. Min A for LBD. Mod A x2 for shower transfer. CG with SB for toilet transfer. Mod A x1 for toileting/hygiene.  PT: Min A for sit to supine. Mod A for supine to sit. Max A for transfers. Min A x1 for stand pivot transfer with RW. Amb 65 ft with bariatric RW and WCF with min A. 6 inch step mod A x2.   RT: Limited participation.   DME: Bariatric RW, WC, drop arm commode  Barriers: New fevers  TDD: 12/13/23 to home.  ----------------------------------------  OUTPATIENT/FOLLOW UP:    Trae Whitney  Pulmonary Disease  [address], [address] [place] [address]  Phone: [phone]  Fax: [phone]  Follow Up Time: 2 weeks    Ryanne Allen  Rheumatology  [address] [address]  Phone: [phone]  Fax: [phone]  Follow Up Time: 1 week    Kyle Pierce  Internal Medicine  [address] [place] [address], [address]  [place] [address]  Phone: [phone]  Fax: [phone]  Follow Up Time: 2 weeks    Addy Powers  Pulmonary Disease  [address] [facility], [address]  [place], [place] [id number]  Phone: [phone]  Fax: [phone]  Follow Up Time:     Kwame Hawley [facility]  Critical Care Medicine  [address] [facility], [address]  [place] [address]  Phone: [phone]  Fax: [phone]  Follow Up Time:     Neena Borrego  Rheumatology  [address] [facility], [address]  [place] [address]  Phone: [phone]  Fax: [phone]  Follow Up Time:    [facility]  [facility] [address]  Scheduled Appointment: 09/13/2023  2:40 PM     Assessment/Plan:  ALIZA FOLEY is a 62 year old female with PMH of pAFIB and sciatica; who presented to [facility] ED with SOB, and was found to have groundglass opacities and interstitial lung disease. She was treated with empiric Vancomycin and Zosyn. She underwent a bronchoscopy with bronchoalveolar lavage and pulse steroids. She was given IV diuretics for increased pulmonary congestion, but her respiratory status continued to worsen.  On 9/13, she was transferred to [facility] for ECMO requirements. She was further treated with Plasmapheresis, Rituximab and high-dose steroids, with significant improvement. Her overall hospital course was complicated by thrombocytopenia (s/p several platelet transfusions), leukocytosis (infectious workup entirely negative- s/p Vanco and Ceftriaxone), AFIB with RVR (resolved with Metoprolol), dysphagia (requiring NGT), transaminitis (secondary to acute illness and hypotension),  non-occlusive RIJ DVT (started on Lovenox). Patient now admitted for a multidisciplinary rehab program. 10-05-23 @ 13:18    * Sustained functional improvement including progress with Wt bearing LE and improvement with ability at transfers, concentrate on prox LE muscle strengthening and endurance exercises and transfers  * Making further progress, increased ambulatory distance with walker up to 80 ft  * UTI - Amoxicillin TID, per ID for UTI  * Cankre sore- Lidocaine swish and spit TID x3 days   * Labs discusseed--unremarkable  * RUE venous duplex check for resolution of know DVT     #Interstitial Lung Disease and Mixed connective tissue disease  - Gait Instability, ADL impairments and Functional impairments: start Comprehensive Rehab Program of PT/OT/SLP - 3 hours a day, 5 days a week  - P&O as needed   - Non-specific Interstitial Lung Disease  - Requiring ECMO   - Improvement s/p Plasmapheresis, Rituximab and high- dose steroids   - Albuterol/Ipratropium Nebulizer every 6 hours  - Mepron 1500mg daily  - PO Medrol ( due to liver dysfunction): Plan will be to taper by ~20% every 2 weeks    Medrol   64mg x 2 weeks   56mg x 2 weeks  44mg x 2 weeks   36mg x 2 weeks - Follow up Outpatient   - Plan to wean 02 as tolerated, Continue tapering oxygen,  -  CT Chest noted 11/10---Resp f/u review  11/14, appreciated  They reports sustained improvement, clinical (normal oxy sats on R/A, sustained progress with oxy tapering), and radiological (no acute findings on recent CT Chest, rather residual chronic infiltrative diesease noted, with recommendation to repeat CT after Acute rehab treatment     #Fever  - Recent UTI on Bactrim (since DCd)  - UCX with enterococcus faecalis  - 101.4 fever overnight (12/4-12/5)  - Lactate of 2.5- patient refused IV fluids   - Start Zosyn IVPB 12/6 (pt agreeable)  - RVP negative  - Await blood cultures, CXR  - ID consult and Pulm re-eval (no respiratory symptoms)  - Pulm recs: continue to monitor   - ID recs: CT chest- stable (12/5)   - Repeat Lactate of 3.1 - agreeable to IV fluids- s/p 1000mL bolus   - BL LE doppler- negative   --UTI--On Amoxyl    #Cankre sore  - Lidocaine swish and spit TID x3 days     #Knee buckling  - 12/1: S/p guided fall with physical therapy  - Back muscle strain with catching self on RW   - Lidocaine patch to back and BL deltoids  - Ibuprofen 400mg x1 dose     #Experiencing R mandible and occipital numbness, with associated hair loss- outpatient follow up with Rheumatology    #Posttnasal drip--ENT review 11/7, declined scope, continue flonase     #pAFIB/Rt Ij thrombus  - Metoprolol 25mg BID and lovenox  ---Eliquis 5mg BID - tolerating well   -- RUE venous duplex check for resolution of know DVT    #Chronic Tinnitus   - ENT review and recs appreciate, Patient already made ENT appointment for f/u    #Lymphedema both legs -chronic and Rt IJ thrombus  - ACE Wrap BL LEs  - BL LE doppler negative for DVT  - BL UE doppler with chronic thrombotic changes in RIJ     #Transaminitis --LFT Down trending   - Secondary to acute illness and hypotension at [facility]  - Outpatient follow up     #Leucocytosis--steroid related, continue monitoring     #Neuropathy  - Gabapentin 300 mg BID, will consider increasing dose, increase to TID 11/10  --Work on motor strengthening, priority for UE as preferred by patient   - Vit b12 >2,000 in 9/2023  --Will consider Rhuematology f/u if needed    #Sleep/Mood  - Melatonin 6mg at HS  - Psych rec Xanax 0.25mg PRN    #Skin  - Skin: Left lower abdomen ruptured blister 3.0 x 1.0, stage 2 pressure injury to right buttock 4 x1 and left buttock 3x1, stage 2 pressure injury ti right inner thigh 0.2 x 0.2, right groin wound 10.5 x 2.0,   Wound care review, Left groin wound 11/15--- 0.6x1.8.0.1cm, no slough, healthy base  -- MAD to skin folds- Nystatin to submammary and abdominal pannus BID  -- MAD to L groin- cleanse with NS, Triad cream daily  -- Mad to R groin- Nystatin powder daily   -- R groin wound-- aquacel packing, Triad to periwound, Allevyn foam- daily and PRN   - Pressure injury/Skin: OOB to Chair, PT/OT   - Offload pressure, low air loss support surfaces, T&P every 2 hours   --Wound care consult-10/9 -Multiple wounds--perineal and buttock/sacral, recs appreciated   --Suggest Continue treatments as below:   Twice daily & PRN Normal Saline Cleanse of abdominal pannus & breast folds, perineal, Left & Right inner buttock erosions.  Pat thoroughly dry.   Apply Desitin generously twice daily (& PRN) to perineal, buttocks, and left groin erosions, leave open to air.    Apply Nystatin powder to abdominal pannus and breast folds.  Suggest use of Interdry cloths in folds to 'wick' moisture.  Suggest daily Normal Saline Cleanse of right groin surgical wound, pat dry.  Apply Cavillon film barrier to intact periwound skin.  Place Aquacell strip over open area, cover with foam dressing.  - Wound care following     #Pain Mgmt   - Tylenol PRN   - Gabapentin 300mg at HS     #GI/Bowel Mgmt/Hx of alternating bowel movements  - Pantoprazole  - PRN: Maalox   --Lactobacillus BID     #/Bladder Mgmt   -Spontaneously voiding   - UA (11/3) negative  - +UTI- Amoxicillin     #FEN   #Dysphagia  - Diet - Minced & Moist  [CC]    - Dysphagia- SLP evaluation appreciated     #Health maintenance  - Folic Acid   - MVI  - Ocean nasal spray    # Difficulty with access to peripheral veins-- Blood draws from Left arm Medline --RUE F/U venous duplex 12/11    #Precautions / PROPHYLAXIS:   - Falls  - ortho: Weight bearing status: WBAT   - Lungs: Aspiration, Incentive Spirometer   - DVT PPX: Eliquis 5 mg BID   ----------------------------------------  12/11- --Labs CBC mild anemia, normal renal function, LFT elevated, downtrending overall unremarkable   ----------------------------------------  Liaison with other providers/agencies    PEER TO PEER with Dr Tripp (patient's insurance company)  * Peer to Peer completed, insurance approval retrospectively given to cover from 11/14 to 11/20, Insurance co awaits updated notes to determine further approval of coverage   SW team to send updated clinical and functional notes to medical insurance company  ----------------------------------------  IDT conference on 12/5  Social Work: Awaiting insurance response on clinicals  SLP: -- n/a  OT: Setup for eating/grooming. Sup for UBD. Min A for LBD. Mod A x2 for shower transfer. CG with SB for toilet transfer. Mod A x1 for toileting/hygiene.  PT: Min A for sit to supine. Mod A for supine to sit. Max A for transfers. Min A x1 for stand pivot transfer with RW. Amb 65 ft with bariatric RW and WCF with min A. 6 inch step mod A x2.   RT: Limited participation.   DME: Bariatric RW, WC, drop arm commode  Barriers: New fevers  TDD: 12/13/23 to home.  ----------------------------------------  OUTPATIENT/FOLLOW UP:    Trae Whitney  Pulmonary Disease  [address], [address] [place] [address]  Phone: [phone]  Fax: [phone]  Follow Up Time: 2 weeks    Ryanne Allen  Rheumatology  [address] [address]  Phone: [phone]  Fax: [phone]  Follow Up Time: 1 week    Kyle Pierce  Internal Medicine  [address] [place] [address], [address]  [place] [address]  Phone: [phone]  Fax: [phone]  Follow Up Time: 2 weeks    Addy Powers  Pulmonary Disease  [address] [facility], [address]  [place], [place] [address]  Phone: [phone]  Fax: [phone]  Follow Up Time:     [facility] Hawley [facility]  Critical Care Medicine  [address] [facility], [address]  [place] [address]  Phone: [phone]  Fax: [phone]  Follow Up Time:     Neena Borrego  Rheumatology  [address] [facility], [address]  [place] [address]  Phone: [phone]  Fax: [phone]  Follow Up Time:    [facility]  [facility] [address]  Scheduled Appointment: 09/13/2023  2:40 PM

## 2023-12-12 PROCEDURE — 99233 SBSQ HOSP IP/OBS HIGH 50: CPT

## 2023-12-12 RX ADMIN — Medication 6 MILLIGRAM(S): at 21:01

## 2023-12-12 RX ADMIN — Medication 500 MILLIGRAM(S): at 21:00

## 2023-12-12 RX ADMIN — Medication 5 MILLIGRAM(S): at 20:59

## 2023-12-12 RX ADMIN — PANTOPRAZOLE SODIUM 40 MILLIGRAM(S): 20 TABLET, DELAYED RELEASE ORAL at 08:47

## 2023-12-12 RX ADMIN — Medication 500 MILLIGRAM(S): at 08:48

## 2023-12-12 RX ADMIN — GABAPENTIN 300 MILLIGRAM(S): 400 CAPSULE ORAL at 17:58

## 2023-12-12 RX ADMIN — Medication 500 MILLIGRAM(S): at 17:58

## 2023-12-12 RX ADMIN — APIXABAN 5 MILLIGRAM(S): 2.5 TABLET, FILM COATED ORAL at 20:58

## 2023-12-12 RX ADMIN — NYSTATIN CREAM 1 APPLICATION(S): 100000 CREAM TOPICAL at 22:51

## 2023-12-12 RX ADMIN — GABAPENTIN 300 MILLIGRAM(S): 400 CAPSULE ORAL at 21:01

## 2023-12-12 RX ADMIN — Medication 500 MILLIGRAM(S): at 20:59

## 2023-12-12 RX ADMIN — Medication 12.5 MILLIGRAM(S): at 20:59

## 2023-12-12 RX ADMIN — Medication 12.5 MILLIGRAM(S): at 08:48

## 2023-12-12 RX ADMIN — LIDOCAINE 2 PATCH: 4 CREAM TOPICAL at 08:47

## 2023-12-12 RX ADMIN — GABAPENTIN 300 MILLIGRAM(S): 400 CAPSULE ORAL at 08:48

## 2023-12-12 RX ADMIN — ZINC OXIDE 1 APPLICATION(S): 200 OINTMENT TOPICAL at 22:52

## 2023-12-12 RX ADMIN — NYSTATIN CREAM 1 APPLICATION(S): 100000 CREAM TOPICAL at 08:49

## 2023-12-12 RX ADMIN — Medication 1 TABLET(S): at 20:59

## 2023-12-12 RX ADMIN — APIXABAN 5 MILLIGRAM(S): 2.5 TABLET, FILM COATED ORAL at 08:48

## 2023-12-12 RX ADMIN — Medication 36 MILLIGRAM(S): at 08:48

## 2023-12-12 RX ADMIN — Medication 1 MILLIGRAM(S): at 21:00

## 2023-12-12 RX ADMIN — ATOVAQUONE 1500 MILLIGRAM(S): 750 SUSPENSION ORAL at 20:59

## 2023-12-12 RX ADMIN — ZINC OXIDE 1 APPLICATION(S): 200 OINTMENT TOPICAL at 17:58

## 2023-12-12 RX ADMIN — ZINC OXIDE 1 APPLICATION(S): 200 OINTMENT TOPICAL at 08:49

## 2023-12-12 RX ADMIN — Medication 1 TABLET(S): at 08:48

## 2023-12-12 RX ADMIN — LIDOCAINE 1 PATCH: 4 CREAM TOPICAL at 08:47

## 2023-12-12 NOTE — PROGRESS NOTE ADULT - SUBJECTIVE AND OBJECTIVE BOX
SUBJECTIVE/ROS:  Patient seen and examined at bedside this AM. Partner present.  Reports no new med events  Slept well  Discussed resolution of Rt arm DVT     ROS--No chest or abd pain, no palpitations nausea or vomiting, LBM 12/11    Therapy  Improvement with ambulatory distance as noted  Function as noted in IDT today  Re enforced need for use of bed side commode or toilet facility instead of premafit considering his sustained functional improvement, She agreed to adhere to this  Sustained improvement of overall function as noted  Min A for sit to supine transfers (improved from Mod A x2 persons perviously)  Ambulating 65ft with bariatric RW (improved from 15 ft previously)      Vital Signs Last 24 Hrs  T(C): 36.9 (12 Dec 2023 08:56), Max: 36.9 (12 Dec 2023 08:56)  T(F): 98.4 (12 Dec 2023 08:56), Max: 98.4 (12 Dec 2023 08:56)  HR: 88 (12 Dec 2023 08:56) (77 - 88)  BP: 108/69 (12 Dec 2023 08:56) (108/69 - 139/88)  RR: 16 (12 Dec 2023 08:56) (16 - 16)  SpO2: 95% (12 Dec 2023 08:56) (95% - 98%)  O2 Parameters below as of 12 Dec 2023 08:56  Patient On (Oxygen Delivery Method): room air        PHYSICAL EXAM:  Gen -  Comfortable,  at bedside -normal oxy sat and subsequently self ambulating WC functional distance  Pulm - clear  Cardiovascular - HS i and II intermittently irregular  Abdomen - Soft, non tender,   Extremities - edema to b/l LE, no calf tenderness, LUE Med line    Neuro-  Cognitive - awake, alert, fully oriented,  Light tough sensation diffusely reduced on fingers and dorsal foot, and over right lower jaw, localized area approx 2 cm, no skin changes noted,    Motor -                    LEFT    UE - 4/5                    RIGHT UE - 4/5                     LEFT    LE - 3+/5, distally and 3/5 proximal                    RIGHT LE - Ankles PF 3/5 ,DF 2/5, Knee ext 3/5 Hips limited by pain Rt hip due to ulcer at ECHO access site   Sensory - decreased light tough sensation fingers and sole of foot, difusely  MSK: improvement with motor strength  Psychiatric - Mood stable, Affect WNL  Skin -- , Pressure injury on b/l buttock is healing, no discharge  Wound on right pito, continues to improve, very limited surface area and shallow  MMT--Able to contract B/L upper back muscles, EF/EE 4/5 distally, knee Est 3+/5        RECENT LABS/IMAGING                        10.9   7.49  )-----------( 325      ( 11 Dec 2023 08:51 )             35.6     12-11    138  |  104  |  19  ----------------------------<  130<H>  4.6   |  27  |  0.70    Ca    9.7      11 Dec 2023 08:51    TPro  5.6<L>  /  Alb  2.8<L>  /  TBili  0.6  /  DBili  x   /  AST  16  /  ALT  36  /  AlkPhos  119  12-11      Urinalysis Basic - ( 11 Dec 2023 08:51 )    Color: x / Appearance: x / SG: x / pH: x  Gluc: 130 mg/dL / Ketone: x  / Bili: x / Urobili: x   Blood: x / Protein: x / Nitrite: x   Leuk Esterase: x / RBC: x / WBC x   Sq Epi: x / Non Sq Epi: x / Bacteria: x    MEDICATIONS  (STANDING):  amoxicillin 500 milliGRAM(s) Oral three times a day  apixaban 5 milliGRAM(s) Oral every 12 hours  artificial  tears Solution 1 Drop(s) Both EYES every 12 hours  ascorbic acid 500 milliGRAM(s) Oral daily  atovaquone  Suspension 1500 milliGRAM(s) Oral daily  dextrose 5%. 1000 milliLiter(s) (100 mL/Hr) IV Continuous <Continuous>  dextrose 5%. 1000 milliLiter(s) (50 mL/Hr) IV Continuous <Continuous>  dextrose 50% Injectable 12.5 Gram(s) IV Push once  dextrose 50% Injectable 25 Gram(s) IV Push once  dextrose 50% Injectable 25 Gram(s) IV Push once  folic acid 1 milliGRAM(s) Oral daily  gabapentin 300 milliGRAM(s) Oral three times a day  glucagon  Injectable 1 milliGRAM(s) IntraMuscular once  lactobacillus acidophilus 1 Tablet(s) Oral two times a day with meals  lidocaine   4% Patch 2 Patch Transdermal <User Schedule>  lidocaine   4% Patch 1 Patch Transdermal <User Schedule>  melatonin 6 milliGRAM(s) Oral at bedtime  methylPREDNISolone 36 milliGRAM(s) Oral daily  metoprolol tartrate 12.5 milliGRAM(s) Oral two times a day  multivitamin 1 Tablet(s) Oral daily  nystatin Powder 1 Application(s) Topical two times a day  pantoprazole    Tablet 40 milliGRAM(s) Oral before breakfast  polyethylene glycol 3350 17 Gram(s) Oral <User Schedule>  sodium chloride 0.9% Bolus 1000 milliLiter(s) IV Bolus once  sodium chloride 0.9% lock flush 5 milliLiter(s) IV Push two times a day  sodium chloride 0.9%. 1000 milliLiter(s) (100 mL/Hr) IV Continuous <Continuous>  zinc oxide 40% Paste 1 Application(s) Topical three times a day    MEDICATIONS  (PRN):  acetaminophen     Tablet .. 650 milliGRAM(s) Oral every 6 hours PRN Temp greater or equal to 38C (100.4F), Mild Pain (1 - 3), Moderate Pain (4 - 6)  albuterol/ipratropium for Nebulization 3 milliLiter(s) Nebulizer every 6 hours PRN Shortness of Breath and/or Wheezing  ALPRAZolam 0.25 milliGRAM(s) Oral every 6 hours PRN Anxiety  aluminum hydroxide/magnesium hydroxide/simethicone Suspension 30 milliLiter(s) Oral every 4 hours PRN Dyspepsia  ammonium lactate 12% Lotion 1 Application(s) Topical every 12 hours PRN BL feet dryness  baclofen 5 milliGRAM(s) Oral every 8 hours PRN Musculoskeletal Pain  benzocaine/menthol Lozenge 1 Lozenge Oral two times a day PRN Sore Throat  benzonatate 100 milliGRAM(s) Oral three times a day PRN Cough  bisacodyl 5 milliGRAM(s) Oral daily PRN Constipation  dextrose Oral Gel 15 Gram(s) Oral once PRN Blood Glucose LESS THAN 70 milliGRAM(s)/deciliter  hydrocortisone hemorrhoidal Suppository 1 Suppository(s) Rectal two times a day PRN Hemorrhoids  loperamide 2 milliGRAM(s) Oral three times a day PRN Diarrhea  SIMETHICONE SOFTGELS 250 milliGRAM(s),SIMETHICONE SOFTGELS 250 milliGRAM(s) 250 milliGRAM(s) Oral three times a day PRN for gas  sodium chloride 0.65% Nasal 1 Spray(s) Both Nostrils every 8 hours PRN Nasal Congestion    < from: US Duplex Venous Upper Ext Ltd, Right (12.11.23 @ 11:20) >    < end of copied text >  < from: US Duplex Venous Upper Ext Ltd, Right (12.11.23 @ 11:20) >  UPR EXT VEINS LTD RT   ORDERED BY:   MONAE FLYNN     PROCEDURE DATE:  12/11/2023          INTERPRETATION:  CLINICAL INFORMATION: Rule out deep vein thrombosis.    COMPARISON: Venous Doppler dated 12/06/2023.    TECHNIQUE: Duplex sonography of the RIGHT UPPER extremity veins with   color and spectral Doppler, with and without compression.    FINDINGS:    The right internal jugular, subclavian, axillary and brachial veins are   patent and compressible where applicable.  The basilic vein (superficial   vein) is patent and without thrombus.  The cephalic vein (superficial   vein) is patent and without thrombus.    Doppler examination shows normal spontaneous and phasic flow.    IMPRESSION:  No evidence of right upper extremity deep venous thrombosis.

## 2023-12-12 NOTE — PROGRESS NOTE ADULT - ASSESSMENT
Assessment/Plan:  ALIZA FOLEY is a 62 year old female with PMH of pAFIB and sciatica; who presented to [facility] ED with SOB, and was found to have groundglass opacities and interstitial lung disease. She was treated with empiric Vancomycin and Zosyn. She underwent a bronchoscopy with bronchoalveolar lavage and pulse steroids. She was given IV diuretics for increased pulmonary congestion, but her respiratory status continued to worsen.  On 9/13, she was transferred to [facility] for ECMO requirements. She was further treated with Plasmapheresis, Rituximab and high-dose steroids, with significant improvement. Her overall hospital course was complicated by thrombocytopenia (s/p several platelet transfusions), leukocytosis (infectious workup entirely negative- s/p Vanco and Ceftriaxone), AFIB with RVR (resolved with Metoprolol), dysphagia (requiring NGT), transaminitis (secondary to acute illness and hypotension),  non-occlusive RIJ DVT (started on Lovenox). Patient now admitted for a multidisciplinary rehab program. 10-05-23 @ 13:18    * Sustained functional improvement including progress with Wt bearing LE and improvement with ability at transfers, concentrate on prox LE muscle strengthening and endurance exercises and transfers  * Making further progress, increased ambulatory distance   * Not need for premafit use for urine care, continue using toilet or bed side commode  * UTI -treatment completed--remove Left arm IV  * RUE venous duplex check for resolution of know DVT     #Interstitial Lung Disease and Mixed connective tissue disease  - Gait Instability, ADL impairments and Functional impairments: start Comprehensive Rehab Program of PT/OT/SLP - 3 hours a day, 5 days a week  - P&O as needed   - Non-specific Interstitial Lung Disease  - Requiring ECMO   - Improvement s/p Plasmapheresis, Rituximab and high- dose steroids   - Albuterol/Ipratropium Nebulizer every 6 hours  - Mepron 1500mg daily  - PO Medrol ( due to liver dysfunction): Plan will be to taper by ~20% every 2 weeks    Medrol   64mg x 2 weeks   56mg x 2 weeks  44mg x 2 weeks   36mg x 2 weeks - Follow up Outpatient   - Plan to wean 02 as tolerated, Continue tapering oxygen,  -  CT Chest noted 11/10---Resp f/u review  11/14, appreciated  They reports sustained improvement, clinical (normal oxy sats on R/A, sustained progress with oxy tapering), and radiological (no acute findings on recent CT Chest, rather residual chronic infiltrative diesease noted, with recommendation to repeat CT after Acute rehab treatment     #Fever  - Recent UTI on Bactrim (since DCd)  - UCX with enterococcus faecalis  - 101.4 fever overnight (12/4-12/5)  - Lactate of 2.5- patient refused IV fluids   - Start Zosyn IVPB 12/6 (pt agreeable)  - RVP negative  - Await blood cultures, CXR  - ID consult and Pulm re-eval (no respiratory symptoms)  - Pulm recs: continue to monitor   - ID recs: CT chest- stable (12/5)   - Repeat Lactate of 3.1 - agreeable to IV fluids- s/p 1000mL bolus   - BL LE doppler- negative   --UTI--treated     #Cankre sore  - Lidocaine swish and spit TID x3 days     #Knee buckling  - 12/1: S/p guided fall with physical therapy  - Back muscle strain with catching self on RW   - Lidocaine patch to back and BL deltoids  - Ibuprofen 400mg x1 dose     #Experiencing R mandible and occipital numbness, with associated hair loss- outpatient follow up with Rheumatology    #Posttnasal drip--ENT review 11/7, declined scope, continue flonase     #pAFIB/Rt Ij thrombus  - Metoprolol 25mg BID and lovenox  ---Eliquis 5mg BID - tolerating well   -- RUE venous duplex check for resolution of know DVT 12/11    #Chronic Tinnitus   - ENT review and recs appreciate, Patient already made ENT appointment for f/u    #Lymphedema both legs -chronic and Rt IJ thrombus  - ACE Wrap BL LEs  - BL LE doppler negative for DVT  - BL UE doppler with chronic thrombotic changes in RIJ     #Transaminitis --LFT Down trending   - Secondary to acute illness and hypotension at [facility]  - Outpatient follow up     #Leucocytosis--steroid related, continue monitoring     #Neuropathy  - Gabapentin 300 mg BID, will consider increasing dose, increase to TID 11/10  --Work on motor strengthening, priority for UE as preferred by patient   - Vit b12 >2,000 in 9/2023  --Will consider Rhuematology f/u if needed    #Sleep/Mood  - Melatonin 6mg at HS  - Psych rec Xanax 0.25mg PRN    #Skin  - Skin: Left lower abdomen ruptured blister 3.0 x 1.0, stage 2 pressure injury to right buttock 4 x1 and left buttock 3x1, stage 2 pressure injury ti right inner thigh 0.2 x 0.2, right groin wound 10.5 x 2.0,   Wound care review, Left groin wound 11/15--- 0.6x1.8.0.1cm, no slough, healthy base  -- MAD to skin folds- Nystatin to submammary and abdominal pannus BID  -- MAD to L groin- cleanse with NS, Triad cream daily  -- Mad to R groin- Nystatin powder daily   -- R groin wound-- aquacel packing, Triad to periwound, Allevyn foam- daily and PRN   - Pressure injury/Skin: OOB to Chair, PT/OT   - Offload pressure, low air loss support surfaces, T&P every 2 hours   --Wound care consult-10/9 -Multiple wounds--perineal and buttock/sacral, recs appreciated   --Suggest Continue treatments as below:   Twice daily & PRN Normal Saline Cleanse of abdominal pannus & breast folds, perineal, Left & Right inner buttock erosions.  Pat thoroughly dry.   Apply Desitin generously twice daily (& PRN) to perineal, buttocks, and left groin erosions, leave open to air.    Apply Nystatin powder to abdominal pannus and breast folds.  Suggest use of Interdry cloths in folds to 'wick' moisture.  Suggest daily Normal Saline Cleanse of right groin surgical wound, pat dry.  Apply Cavillon film barrier to intact periwound skin.  Place Aquacell strip over open area, cover with foam dressing.  - Wound care following     #Pain Mgmt   - Tylenol PRN   - Gabapentin 300mg at HS     #GI/Bowel Mgmt/Hx of alternating bowel movements  - Pantoprazole  - PRN: Maalox   --Lactobacillus BID     #/Bladder Mgmt   -Spontaneously voiding   - UA (11/3) negative  - +UTI- Amoxicillin     #FEN   #Dysphagia  - Diet - Minced & Moist  [CC]    - Dysphagia- SLP evaluation appreciated     #Health maintenance  - Folic Acid   - MVI  - Ocean nasal spray    # Difficulty with access to peripheral veins-- Blood draws from Left arm Medline --RUE F/U venous duplex 12/11    #Precautions / PROPHYLAXIS:   - Falls  - ortho: Weight bearing status: WBAT   - Lungs: Aspiration, Incentive Spirometer   - DVT PPX: Eliquis 5 mg BID   ----------------------------------------  12/11- --Labs CBC mild anemia, normal renal function, LFT elevated, downtrending overall unremarkable   ----------------------------------------  Liaison with other providers/agencies    PEER TO PEER with Dr Tripp (patient's insurance company)  * Peer to Peer completed, insurance approval retrospectively given to cover from 11/14 to 11/20, Insurance co awaits updated notes to determine further approval of coverage   SW team to send updated clinical and functional notes to medical insurance company  ----------------------------------------  IDT conference on 12/5  Social Work: Awaiting insurance response on clinicals  SLP: -- n/a  OT: Setup for eating/grooming. min assistance for ADLs, mod assistance for toileting  Shower transfers mod assistance x 1  Independent with bed mobility  Ambulating via light gate 100--120ft   Amb 65 ft with bariatric RW and WCF with min A. 6 inch step mod A x2.   Contact guard for stand pivot transfers  Goal--min assist with ADLs  RT--Engaging for activity planning and relaxation exercises  DME: Bariatric RW, WC, drop arm commode  Barriers: New fevers  TDD: 12/20 to home.  ----------------------------------------  OUTPATIENT/FOLLOW UP:    Trae Whitney  Pulmonary Disease  [address], [address] [place] [address]  Phone: [phone]  Fax: [phone]  Follow Up Time: 2 weeks    Ryanne Allen  Rheumatology  [address] [address]  Phone: [phone]  Fax: [phone]  Follow Up Time: 1 week    Kyle Pierce  Internal Medicine  [address] [place] [address], [address]  [place] [address]  Phone: [phone]  Fax: [phone]  Follow Up Time: 2 weeks    Joeyjayna Addyobdulio Harley  Pulmonary Disease  [address] [facility], [address]  [place], [place] [address]  Phone: [phone]  Fax: [phone]  Follow Up Time:     Kwame Hawley  Critical Care Medicine  [address] [facility], [address]  [place] [address]  Phone: [phone]  Fax: [phone]  Follow Up Time:     Neena Borrego  Rheumatology  [address] [place] [address], [address]  [place] [address]  Phone: [phone]  Fax: [phone]  Follow Up Time:    [facility]  [facility] [address]  Scheduled Appointment: 09/13/2023  2:40 PM      Non critical care  Time based billing  Spent 63 mins, patient review, discussion with patient and family, observation in therapy. Assessment/Plan:  ALIZA FOLEY is a 62 year old female with PMH of pAFIB and sciatica; who presented to [facility] ED with SOB, and was found to have groundglass opacities and interstitial lung disease. She was treated with empiric Vancomycin and Zosyn. She underwent a bronchoscopy with bronchoalveolar lavage and pulse steroids. She was given IV diuretics for increased pulmonary congestion, but her respiratory status continued to worsen.  On 9/13, she was transferred to [facility] for ECMO requirements. She was further treated with Plasmapheresis, Rituximab and high-dose steroids, with significant improvement. Her overall hospital course was complicated by thrombocytopenia (s/p several platelet transfusions), leukocytosis (infectious workup entirely negative- s/p Vanco and Ceftriaxone), AFIB with RVR (resolved with Metoprolol), dysphagia (requiring NGT), transaminitis (secondary to acute illness and hypotension),  non-occlusive RIJ DVT (started on Lovenox). Patient now admitted for a multidisciplinary rehab program. 10-05-23 @ 13:18    * Sustained functional improvement including progress with Wt bearing LE and improvement with ability at transfers, concentrate on prox LE muscle strengthening and endurance exercises and transfers  * Making further progress, increased ambulatory distance   * Not need for premafit use for urine care, continue using toilet or bed side commode  * UTI -treatment completed--remove Left arm IV  * RUE venous duplex check for resolution of know DVT     #Interstitial Lung Disease and Mixed connective tissue disease  - Gait Instability, ADL impairments and Functional impairments: start Comprehensive Rehab Program of PT/OT/SLP - 3 hours a day, 5 days a week  - P&O as needed   - Non-specific Interstitial Lung Disease  - Requiring ECMO   - Improvement s/p Plasmapheresis, Rituximab and high- dose steroids   - Albuterol/Ipratropium Nebulizer every 6 hours  - Mepron 1500mg daily  - PO Medrol ( due to liver dysfunction): Plan will be to taper by ~20% every 2 weeks    Medrol   64mg x 2 weeks   56mg x 2 weeks  44mg x 2 weeks   36mg x 2 weeks - Follow up Outpatient   - Plan to wean 02 as tolerated, Continue tapering oxygen,  -  CT Chest noted 11/10---Resp f/u review  11/14, appreciated  They reports sustained improvement, clinical (normal oxy sats on R/A, sustained progress with oxy tapering), and radiological (no acute findings on recent CT Chest, rather residual chronic infiltrative diesease noted, with recommendation to repeat CT after Acute rehab treatment     #Fever  - Recent UTI on Bactrim (since DCd)  - UCX with enterococcus faecalis  - 101.4 fever overnight (12/4-12/5)  - Lactate of 2.5- patient refused IV fluids   - Start Zosyn IVPB 12/6 (pt agreeable)  - RVP negative  - Await blood cultures, CXR  - ID consult and Pulm re-eval (no respiratory symptoms)  - Pulm recs: continue to monitor   - ID recs: CT chest- stable (12/5)   - Repeat Lactate of 3.1 - agreeable to IV fluids- s/p 1000mL bolus   - BL LE doppler- negative   --UTI--treated     #Cankre sore  - Lidocaine swish and spit TID x3 days     #Knee buckling  - 12/1: S/p guided fall with physical therapy  - Back muscle strain with catching self on RW   - Lidocaine patch to back and BL deltoids  - Ibuprofen 400mg x1 dose     #Experiencing R mandible and occipital numbness, with associated hair loss- outpatient follow up with Rheumatology    #Posttnasal drip--ENT review 11/7, declined scope, continue flonase     #pAFIB/Rt Ij thrombus  - Metoprolol 25mg BID and lovenox  ---Eliquis 5mg BID - tolerating well   -- RUE venous duplex check for resolution of know DVT 12/11    #Chronic Tinnitus   - ENT review and recs appreciate, Patient already made ENT appointment for f/u    #Lymphedema both legs -chronic and Rt IJ thrombus  - ACE Wrap BL LEs  - BL LE doppler negative for DVT  - BL UE doppler with chronic thrombotic changes in RIJ     #Transaminitis --LFT Down trending   - Secondary to acute illness and hypotension at [facility]  - Outpatient follow up     #Leucocytosis--steroid related, continue monitoring     #Neuropathy  - Gabapentin 300 mg BID, will consider increasing dose, increase to TID 11/10  --Work on motor strengthening, priority for UE as preferred by patient   - Vit b12 >2,000 in 9/2023  --Will consider Rhuematology f/u if needed    #Sleep/Mood  - Melatonin 6mg at HS  - Psych rec Xanax 0.25mg PRN    #Skin  - Skin: Left lower abdomen ruptured blister 3.0 x 1.0, stage 2 pressure injury to right buttock 4 x1 and left buttock 3x1, stage 2 pressure injury ti right inner thigh 0.2 x 0.2, right groin wound 10.5 x 2.0,   Wound care review, Left groin wound 11/15--- 0.6x1.8.0.1cm, no slough, healthy base  -- MAD to skin folds- Nystatin to submammary and abdominal pannus BID  -- MAD to L groin- cleanse with NS, Triad cream daily  -- Mad to R groin- Nystatin powder daily   -- R groin wound-- aquacel packing, Triad to periwound, Allevyn foam- daily and PRN   - Pressure injury/Skin: OOB to Chair, PT/OT   - Offload pressure, low air loss support surfaces, T&P every 2 hours   --Wound care consult-10/9 -Multiple wounds--perineal and buttock/sacral, recs appreciated   --Suggest Continue treatments as below:   Twice daily & PRN Normal Saline Cleanse of abdominal pannus & breast folds, perineal, Left & Right inner buttock erosions.  Pat thoroughly dry.   Apply Desitin generously twice daily (& PRN) to perineal, buttocks, and left groin erosions, leave open to air.    Apply Nystatin powder to abdominal pannus and breast folds.  Suggest use of Interdry cloths in folds to 'wick' moisture.  Suggest daily Normal Saline Cleanse of right groin surgical wound, pat dry.  Apply Cavillon film barrier to intact periwound skin.  Place Aquacell strip over open area, cover with foam dressing.  - Wound care following     #Pain Mgmt   - Tylenol PRN   - Gabapentin 300mg at HS     #GI/Bowel Mgmt/Hx of alternating bowel movements  - Pantoprazole  - PRN: Maalox   --Lactobacillus BID     #/Bladder Mgmt   -Spontaneously voiding   - UA (11/3) negative  - +UTI- Amoxicillin     #FEN   #Dysphagia  - Diet - Minced & Moist  [CC]    - Dysphagia- SLP evaluation appreciated     #Health maintenance  - Folic Acid   - MVI  - Ocean nasal spray    # Difficulty with access to peripheral veins-- Blood draws from Left arm Medline --RUE F/U venous duplex 12/11    #Precautions / PROPHYLAXIS:   - Falls  - ortho: Weight bearing status: WBAT   - Lungs: Aspiration, Incentive Spirometer   - DVT PPX: Eliquis 5 mg BID   ----------------------------------------  12/11- --Labs CBC mild anemia, normal renal function, LFT elevated, downtrending overall unremarkable   ----------------------------------------  Liaison with other providers/agencies    PEER TO PEER with Dr Tripp (patient's insurance company)  * Peer to Peer completed, insurance approval retrospectively given to cover from 11/14 to 11/20, Insurance co awaits updated notes to determine further approval of coverage   SW team to send updated clinical and functional notes to medical insurance company  ----------------------------------------  IDT conference on 12/5  Social Work: Awaiting insurance response on clinicals  SLP: -- n/a  OT: Setup for eating/grooming. min assistance for ADLs, mod assistance for toileting  Shower transfers mod assistance x 1  Independent with bed mobility  Ambulating via light gate 100--120ft   Amb 65 ft with bariatric RW and WCF with min A. 6 inch step mod A x2.   Contact guard for stand pivot transfers  Goal--min assist with ADLs  RT--Engaging for activity planning and relaxation exercises  DME: Bariatric RW, WC, drop arm commode  Barriers: New fevers  TDD: 12/20 to home.  ----------------------------------------  OUTPATIENT/FOLLOW UP:    Trae Whitney  Pulmonary Disease  [address], [address] [place] [address]  Phone: [phone]  Fax: [phone]  Follow Up Time: 2 weeks    Ryanne Allen  Rheumatology  [address] [address]  Phone: [phone]  Fax: [phone]  Follow Up Time: 1 week    Kyle Pierce  Internal Medicine  [address] [place] [address], [address]  [place] [address]  Phone: [phone]  Fax: [phone]  Follow Up Time: 2 weeks    Joeyjayna Addyobdulio Harley  Pulmonary Disease  [address] [facility], [address]  [place], [place] [id number]  Phone: [phone]  Fax: [phone]  Follow Up Time:     Kwame Hawley  Critical Care Medicine  [address] [facility], [address]  [place] [address]  Phone: [phone]  Fax: [phone]  Follow Up Time:     Neena Borrego  Rheumatology  [address] [place] [address], [address]  [place] [address]  Phone: [phone]  Fax: [phone]  Follow Up Time:    [facility]  [facility] [address]  Scheduled Appointment: 09/13/2023  2:40 PM      Non critical care  Time based billing  Spent 63 mins, patient review, discussion with patient and family, observation in therapy.

## 2023-12-13 PROCEDURE — 99233 SBSQ HOSP IP/OBS HIGH 50: CPT

## 2023-12-13 PROCEDURE — 99232 SBSQ HOSP IP/OBS MODERATE 35: CPT

## 2023-12-13 RX ADMIN — APIXABAN 5 MILLIGRAM(S): 2.5 TABLET, FILM COATED ORAL at 20:34

## 2023-12-13 RX ADMIN — Medication 1 DROP(S): at 20:31

## 2023-12-13 RX ADMIN — Medication 36 MILLIGRAM(S): at 08:30

## 2023-12-13 RX ADMIN — Medication 12.5 MILLIGRAM(S): at 08:30

## 2023-12-13 RX ADMIN — LIDOCAINE 1 PATCH: 4 CREAM TOPICAL at 08:31

## 2023-12-13 RX ADMIN — Medication 500 MILLIGRAM(S): at 20:35

## 2023-12-13 RX ADMIN — Medication 1 TABLET(S): at 20:34

## 2023-12-13 RX ADMIN — ZINC OXIDE 1 APPLICATION(S): 200 OINTMENT TOPICAL at 15:08

## 2023-12-13 RX ADMIN — LIDOCAINE 1 PATCH: 4 CREAM TOPICAL at 19:02

## 2023-12-13 RX ADMIN — ZINC OXIDE 1 APPLICATION(S): 200 OINTMENT TOPICAL at 08:35

## 2023-12-13 RX ADMIN — Medication 1 TABLET(S): at 08:30

## 2023-12-13 RX ADMIN — GABAPENTIN 300 MILLIGRAM(S): 400 CAPSULE ORAL at 08:29

## 2023-12-13 RX ADMIN — GABAPENTIN 300 MILLIGRAM(S): 400 CAPSULE ORAL at 15:07

## 2023-12-13 RX ADMIN — Medication 500 MILLIGRAM(S): at 08:30

## 2023-12-13 RX ADMIN — Medication 1 MILLIGRAM(S): at 20:32

## 2023-12-13 RX ADMIN — LIDOCAINE 2 PATCH: 4 CREAM TOPICAL at 19:02

## 2023-12-13 RX ADMIN — LIDOCAINE 2 PATCH: 4 CREAM TOPICAL at 08:31

## 2023-12-13 RX ADMIN — NYSTATIN CREAM 1 APPLICATION(S): 100000 CREAM TOPICAL at 08:34

## 2023-12-13 RX ADMIN — Medication 5 MILLIGRAM(S): at 15:07

## 2023-12-13 RX ADMIN — Medication 1 TABLET(S): at 20:33

## 2023-12-13 RX ADMIN — PANTOPRAZOLE SODIUM 40 MILLIGRAM(S): 20 TABLET, DELAYED RELEASE ORAL at 08:30

## 2023-12-13 RX ADMIN — APIXABAN 5 MILLIGRAM(S): 2.5 TABLET, FILM COATED ORAL at 08:30

## 2023-12-13 RX ADMIN — GABAPENTIN 300 MILLIGRAM(S): 400 CAPSULE ORAL at 20:31

## 2023-12-13 RX ADMIN — Medication 12.5 MILLIGRAM(S): at 20:32

## 2023-12-13 RX ADMIN — ATOVAQUONE 1500 MILLIGRAM(S): 750 SUSPENSION ORAL at 20:31

## 2023-12-13 NOTE — PROGRESS NOTE ADULT - SUBJECTIVE AND OBJECTIVE BOX
SUBJECTIVE/ROS:  Patient seen and examined observed in therapy  Report good night rest  Reports normal bowel and bladder function      ROS--No chest or abd pain, no palpitations nausea or vomiting, LBM 12/12    Therapy  Sustained improvement noted  Observed ambulating > 100ft with RW, CGA  Main deficit is with hip extension, hence requiring mod assist sit to stand from WC    Vital Signs Last 24 Hrs  T(C): 36.9 (13 Dec 2023 08:15), Max: 36.9 (13 Dec 2023 08:15)  T(F): 98.4 (13 Dec 2023 08:15), Max: 98.4 (13 Dec 2023 08:15)  HR: 87 (13 Dec 2023 08:15) (87 - 88)  BP: 118/70 (13 Dec 2023 08:15) (111/71 - 118/70)  RR: 16 (13 Dec 2023 08:15) (16 - 16)  SpO2: 92% (13 Dec 2023 08:15) (92% - 95%)  O2 Parameters below as of 13 Dec 2023 08:15  Patient On (Oxygen Delivery Method): room air      PHYSICAL EXAM:  Gen -  Comfortable,  at bedside -normal oxy sat and subsequently self ambulating WC functional distance  Pulm - clear  Cardiovascular - HS i and II intermittently irregular  Abdomen - Soft, non tender,   Extremities - edema to b/l LE, no calf tenderness, LUE Med line    Neuro-  Cognitive - awake, alert, fully oriented,  Light tough sensation diffusely reduced on fingers and dorsal foot, and over right lower jaw, localized area approx 2 cm, no skin changes noted,    Motor -                    LEFT    UE - 4/5                    RIGHT UE - 4/5                     LEFT    LE - 3+/5, distally and 3/5 proximal                    RIGHT LE - Ankles PF 3/5 ,DF 2/5, Knee ext 3/5 Hips limited by pain Rt hip due to ulcer at ECHO access site   Sensory - decreased light tough sensation fingers and sole of foot, difusely  MSK: improvement with motor strength  Psychiatric - Mood stable, Affect WNL  Skin -- , Pressure injury on b/l buttock is healing, no discharge  Wound on right pito, continues to improve, very limited surface area and shallow  MMT--Able to contract B/L upper back muscles, EF/EE 4/5 distally, knee Est 3+/5      RECENT LABS/IMAGING                  10.9   7.49  )-----------( 325      ( 11 Dec 2023 08:51 )             35.6     12-11    138  |  104  |  19  ----------------------------<  130<H>  4.6   |  27  |  0.70    Ca    9.7      11 Dec 2023 08:51    TPro  5.6<L>  /  Alb  2.8<L>  /  TBili  0.6  /  DBili  x   /  AST  16  /  ALT  36  /  AlkPhos  119  12-11      Urinalysis Basic - ( 11 Dec 2023 08:51 )    Color: x / Appearance: x / SG: x / pH: x  Gluc: 130 mg/dL / Ketone: x  / Bili: x / Urobili: x   Blood: x / Protein: x / Nitrite: x   Leuk Esterase: x / RBC: x / WBC x   Sq Epi: x / Non Sq Epi: x / Bacteria: x    MEDICATIONS  (STANDING):  apixaban 5 milliGRAM(s) Oral every 12 hours  artificial  tears Solution 1 Drop(s) Both EYES every 12 hours  ascorbic acid 500 milliGRAM(s) Oral daily  atovaquone  Suspension 1500 milliGRAM(s) Oral daily  dextrose 5%. 1000 milliLiter(s) (100 mL/Hr) IV Continuous <Continuous>  dextrose 5%. 1000 milliLiter(s) (50 mL/Hr) IV Continuous <Continuous>  dextrose 50% Injectable 12.5 Gram(s) IV Push once  dextrose 50% Injectable 25 Gram(s) IV Push once  dextrose 50% Injectable 25 Gram(s) IV Push once  folic acid 1 milliGRAM(s) Oral daily  gabapentin 300 milliGRAM(s) Oral three times a day  glucagon  Injectable 1 milliGRAM(s) IntraMuscular once  lactobacillus acidophilus 1 Tablet(s) Oral two times a day with meals  lidocaine   4% Patch 2 Patch Transdermal <User Schedule>  lidocaine   4% Patch 1 Patch Transdermal <User Schedule>  melatonin 6 milliGRAM(s) Oral at bedtime  methylPREDNISolone 36 milliGRAM(s) Oral daily  metoprolol tartrate 12.5 milliGRAM(s) Oral two times a day  multivitamin 1 Tablet(s) Oral daily  nystatin Powder 1 Application(s) Topical two times a day  pantoprazole    Tablet 40 milliGRAM(s) Oral before breakfast  polyethylene glycol 3350 17 Gram(s) Oral <User Schedule>  sodium chloride 0.9% Bolus 1000 milliLiter(s) IV Bolus once  sodium chloride 0.9% lock flush 5 milliLiter(s) IV Push two times a day  sodium chloride 0.9%. 1000 milliLiter(s) (100 mL/Hr) IV Continuous <Continuous>  zinc oxide 40% Paste 1 Application(s) Topical three times a day    MEDICATIONS  (PRN):  acetaminophen     Tablet .. 650 milliGRAM(s) Oral every 6 hours PRN Temp greater or equal to 38C (100.4F), Mild Pain (1 - 3), Moderate Pain (4 - 6)  albuterol/ipratropium for Nebulization 3 milliLiter(s) Nebulizer every 6 hours PRN Shortness of Breath and/or Wheezing  ALPRAZolam 0.25 milliGRAM(s) Oral every 6 hours PRN Anxiety  aluminum hydroxide/magnesium hydroxide/simethicone Suspension 30 milliLiter(s) Oral every 4 hours PRN Dyspepsia  ammonium lactate 12% Lotion 1 Application(s) Topical every 12 hours PRN BL feet dryness  baclofen 5 milliGRAM(s) Oral every 8 hours PRN Musculoskeletal Pain  benzocaine/menthol Lozenge 1 Lozenge Oral two times a day PRN Sore Throat  benzonatate 100 milliGRAM(s) Oral three times a day PRN Cough  bisacodyl 5 milliGRAM(s) Oral daily PRN Constipation  dextrose Oral Gel 15 Gram(s) Oral once PRN Blood Glucose LESS THAN 70 milliGRAM(s)/deciliter  hydrocortisone hemorrhoidal Suppository 1 Suppository(s) Rectal two times a day PRN Hemorrhoids  loperamide 2 milliGRAM(s) Oral three times a day PRN Diarrhea  SIMETHICONE SOFTGELS 250 milliGRAM(s),SIMETHICONE SOFTGELS 250 milliGRAM(s) 250 milliGRAM(s) Oral three times a day PRN for gas  sodium chloride 0.65% Nasal 1 Spray(s) Both Nostrils every 8 hours PRN Nasal Congestion

## 2023-12-13 NOTE — PROGRESS NOTE ADULT - ASSESSMENT
63 y/o F with chronic AFib, hx sciatica, newly diagnosed ILD (likely associated with MCTD +ARIANA, +RNP, +Sm) with exacerbation requiring ECMO / plasmapharesis / Rituximab / steriods, now at acute rehab.    #Enterococcus UTI  -UCx 12/4 with enterococcus faecalis. Blood cx NGTD  -On amox (12/7-), dc amoxicillin 12/13. Urine, blood cx 12/5 NGTD    #Interstitial Lung Disease  #Mixed connective tissue disease  #Suspect critical illness myopathy from prolonged ICU stay, resolved  -CT 11/10 new predominant peripheral reticular opacities, within lung paranchyma; no significant bronchiectasis or honeycombing  -c/w steroids - taper as scheduled (2 week intervals)   -c/w nebs  -Mepron for PCP ppx while on steroids   -Check ambulatory O2 sat periodically  -Continue comprehensive rehab program - PT/OT/SLP per rehab team  -Pain management, bowel regimen per rehab   -Repeat lactate. Pt afebrile, non toxic    #Chronic macrocytic anemia  -H/H remains stable  -Continue MVI, folate and thiamine    #PAF  #Non-occlusive right IJ thrombus   -c/w Eliquis  -c/w lopressor 12.5mg po BID  -goal HR for pAfib is <110    #Anxiety  -appreciate psych follow-up  -c/w Xanax PRN    #?JACEY  #nocturnal desat  -discussed about CPAP use. oxygen use during sleep and PRN. patient may need OP sleep study. patient is aware. she will speak to her pulm. informed RN to check ambulatory O2 sat during therapy and at rest.     #Severe protein-calorie malnutrition  -Nutrition following    #DVT ppx: Eliquis 61 y/o F with chronic AFib, hx sciatica, newly diagnosed ILD (likely associated with MCTD +ARIANA, +RNP, +Sm) with exacerbation requiring ECMO / plasmapharesis / Rituximab / steriods, now at acute rehab.    #Enterococcus UTI  -UCx 12/4 with enterococcus faecalis. Blood cx NGTD  -On amox (12/7-), dc amoxicillin 12/13. Urine, blood cx 12/5 NGTD    #Interstitial Lung Disease  #Mixed connective tissue disease  #Suspect critical illness myopathy from prolonged ICU stay, resolved  -CT 11/10 new predominant peripheral reticular opacities, within lung paranchyma; no significant bronchiectasis or honeycombing  -c/w steroids - taper as scheduled (2 week intervals)   -c/w nebs  -Mepron for PCP ppx while on steroids   -Check ambulatory O2 sat periodically  -Continue comprehensive rehab program - PT/OT/SLP per rehab team  -Pain management, bowel regimen per rehab   -Repeat lactate. Pt afebrile, non toxic    #Chronic macrocytic anemia  -H/H remains stable  -Continue MVI, folate and thiamine    #PAF  #Non-occlusive right IJ thrombus   -c/w Eliquis  -c/w lopressor 12.5mg po BID  -goal HR for pAfib is <110    #Anxiety  -appreciate psych follow-up  -c/w Xanax PRN    #?JACEY  #nocturnal desat  -discussed about CPAP use. oxygen use during sleep and PRN. patient may need OP sleep study. patient is aware. she will speak to her pulm. informed RN to check ambulatory O2 sat during therapy and at rest.     #Severe protein-calorie malnutrition  -Nutrition following    #DVT ppx: Eliquis

## 2023-12-13 NOTE — PROGRESS NOTE ADULT - SUBJECTIVE AND OBJECTIVE BOX
Patient is a 62y old  Female who presents with a chief complaint of Interstitial Lung Disease (12 Dec 2023 15:13)      Patient seen and examined at bedside. feeling well, NAD. denies headache, fever, chills, cp, sob, n/v, abd pain. endorses some LE fatigue after prolonged therapy.      ALLERGIES:  No Known Allergies    MEDICATIONS  (STANDING):  amoxicillin 500 milliGRAM(s) Oral three times a day  apixaban 5 milliGRAM(s) Oral every 12 hours  artificial  tears Solution 1 Drop(s) Both EYES every 12 hours  ascorbic acid 500 milliGRAM(s) Oral daily  atovaquone  Suspension 1500 milliGRAM(s) Oral daily  dextrose 5%. 1000 milliLiter(s) (100 mL/Hr) IV Continuous <Continuous>  dextrose 5%. 1000 milliLiter(s) (50 mL/Hr) IV Continuous <Continuous>  dextrose 50% Injectable 12.5 Gram(s) IV Push once  dextrose 50% Injectable 25 Gram(s) IV Push once  dextrose 50% Injectable 25 Gram(s) IV Push once  folic acid 1 milliGRAM(s) Oral daily  gabapentin 300 milliGRAM(s) Oral three times a day  glucagon  Injectable 1 milliGRAM(s) IntraMuscular once  lactobacillus acidophilus 1 Tablet(s) Oral two times a day with meals  lidocaine   4% Patch 2 Patch Transdermal <User Schedule>  lidocaine   4% Patch 1 Patch Transdermal <User Schedule>  melatonin 6 milliGRAM(s) Oral at bedtime  methylPREDNISolone 36 milliGRAM(s) Oral daily  metoprolol tartrate 12.5 milliGRAM(s) Oral two times a day  multivitamin 1 Tablet(s) Oral daily  nystatin Powder 1 Application(s) Topical two times a day  pantoprazole    Tablet 40 milliGRAM(s) Oral before breakfast  polyethylene glycol 3350 17 Gram(s) Oral <User Schedule>  sodium chloride 0.9% Bolus 1000 milliLiter(s) IV Bolus once  sodium chloride 0.9% lock flush 5 milliLiter(s) IV Push two times a day  sodium chloride 0.9%. 1000 milliLiter(s) (100 mL/Hr) IV Continuous <Continuous>  zinc oxide 40% Paste 1 Application(s) Topical three times a day    MEDICATIONS  (PRN):  acetaminophen     Tablet .. 650 milliGRAM(s) Oral every 6 hours PRN Temp greater or equal to 38C (100.4F), Mild Pain (1 - 3), Moderate Pain (4 - 6)  albuterol/ipratropium for Nebulization 3 milliLiter(s) Nebulizer every 6 hours PRN Shortness of Breath and/or Wheezing  ALPRAZolam 0.25 milliGRAM(s) Oral every 6 hours PRN Anxiety  aluminum hydroxide/magnesium hydroxide/simethicone Suspension 30 milliLiter(s) Oral every 4 hours PRN Dyspepsia  ammonium lactate 12% Lotion 1 Application(s) Topical every 12 hours PRN BL feet dryness  baclofen 5 milliGRAM(s) Oral every 8 hours PRN Musculoskeletal Pain  benzocaine/menthol Lozenge 1 Lozenge Oral two times a day PRN Sore Throat  benzonatate 100 milliGRAM(s) Oral three times a day PRN Cough  bisacodyl 5 milliGRAM(s) Oral daily PRN Constipation  dextrose Oral Gel 15 Gram(s) Oral once PRN Blood Glucose LESS THAN 70 milliGRAM(s)/deciliter  hydrocortisone hemorrhoidal Suppository 1 Suppository(s) Rectal two times a day PRN Hemorrhoids  loperamide 2 milliGRAM(s) Oral three times a day PRN Diarrhea  SIMETHICONE SOFTGELS 250 milliGRAM(s),SIMETHICONE SOFTGELS 250 milliGRAM(s) 250 milliGRAM(s) Oral three times a day PRN for gas  sodium chloride 0.65% Nasal 1 Spray(s) Both Nostrils every 8 hours PRN Nasal Congestion    Vital Signs Last 24 Hrs  T(F): 98.4 (13 Dec 2023 08:15), Max: 98.4 (13 Dec 2023 08:15)  HR: 87 (13 Dec 2023 08:15) (87 - 88)  BP: 118/70 (13 Dec 2023 08:15) (111/71 - 118/70)  RR: 16 (13 Dec 2023 08:15) (16 - 16)  SpO2: 92% (13 Dec 2023 08:15) (92% - 95%)  I&O's Summary    PHYSICAL EXAM:  General: NAD  ENT: MMM, no scleral icterus  Neck: Supple, No JVD  Lungs: Clear to auscultation bilaterally, no wheezes, rales, rhonchi  Cardio: RRR, S1/S2  Abdomen: Soft, Nontender, Nondistended; Bowel sounds present  Extremities: No calf tenderness, No pitting edema    LABS:                        10.9   7.49  )-----------( 325      ( 11 Dec 2023 08:51 )             35.6       12-11    138  |  104  |  19  ----------------------------<  130  4.6   |  27  |  0.70    Ca    9.7      11 Dec 2023 08:51    TPro  5.6  /  Alb  2.8  /  TBili  0.6  /  DBili  x   /  AST  16  /  ALT  36  /  AlkPhos  119  12-11          Lactate, Blood: 4.2 mmol/L (12-11 @ 08:51)        09-25 Chol -- LDL -- HDL -- Trig 199 mg/dL                  Urinalysis Basic - ( 11 Dec 2023 08:51 )    Color: x / Appearance: x / SG: x / pH: x  Gluc: 130 mg/dL / Ketone: x  / Bili: x / Urobili: x   Blood: x / Protein: x / Nitrite: x   Leuk Esterase: x / RBC: x / WBC x   Sq Epi: x / Non Sq Epi: x / Bacteria: x            RADIOLOGY & ADDITIONAL TESTS:    Care Discussed with Consultants/Other Providers: yes, rehab

## 2023-12-13 NOTE — PROGRESS NOTE ADULT - ASSESSMENT
Assessment/Plan:  ALIZA FOLEY is a 62 year old female with PMH of pAFIB and sciatica; who presented to [facility] ED with SOB, and was found to have groundglass opacities and interstitial lung disease. She was treated with empiric Vancomycin and Zosyn. She underwent a bronchoscopy with bronchoalveolar lavage and pulse steroids. She was given IV diuretics for increased pulmonary congestion, but her respiratory status continued to worsen.  On 9/13, she was transferred to [facility] for ECMO requirements. She was further treated with Plasmapheresis, Rituximab and high-dose steroids, with significant improvement. Her overall hospital course was complicated by thrombocytopenia (s/p several platelet transfusions), leukocytosis (infectious workup entirely negative- s/p Vanco and Ceftriaxone), AFIB with RVR (resolved with Metoprolol), dysphagia (requiring NGT), transaminitis (secondary to acute illness and hypotension),  non-occlusive RIJ DVT (started on Lovenox). Patient now admitted for a multidisciplinary rehab program. 10-05-23 @ 13:18    * Sustained functional improvement including progress with Wt bearing LE and improvement with ability at transfers, concentrate on prox LE muscle strengthening and endurance exercises and transfers  * Ambulating increasing distance with walker, will concentrate on hip extensor strengthening   * Making further progress, increased ambulatory distance       #Interstitial Lung Disease and Mixed connective tissue disease  - Gait Instability, ADL impairments and Functional impairments: start Comprehensive Rehab Program of PT/OT/SLP - 3 hours a day, 5 days a week  - P&O as needed   - Non-specific Interstitial Lung Disease  - Requiring ECMO   - Improvement s/p Plasmapheresis, Rituximab and high- dose steroids   - Albuterol/Ipratropium Nebulizer every 6 hours  - Mepron 1500mg daily  - PO Medrol ( due to liver dysfunction): Plan will be to taper by ~20% every 2 weeks    Medrol   64mg x 2 weeks   56mg x 2 weeks  44mg x 2 weeks   36mg x 2 weeks - Follow up Outpatient   - Plan to wean 02 as tolerated, Continue tapering oxygen,  -  CT Chest noted 11/10---Resp f/u review  11/14, appreciated  They reports sustained improvement, clinical (normal oxy sats on R/A, sustained progress with oxy tapering), and radiological (no acute findings on recent CT Chest, rather residual chronic infiltrative diesease noted, with recommendation to repeat CT after Acute rehab treatment     #Fever  - Recent UTI on Bactrim (since DCd)  - UCX with enterococcus faecalis  - 101.4 fever overnight (12/4-12/5)  - Lactate of 2.5- patient refused IV fluids   - Start Zosyn IVPB 12/6 (pt agreeable)  - RVP negative  - F/U blood cultures, CXR  - ID consult and Pulm re-eval (no respiratory symptoms)  - Pulm recs: continue to monitor   - ID recs: CT chest- stable (12/5)   - Repeat Lactate of 3.1 - agreeable to IV fluids- s/p 1000mL bolus   - BL LE doppler- negative   --UTI--treated     #Cankre sore  - Lidocaine swish and spit TID x3 days     #Knee buckling  - 12/1: S/p guided fall with physical therapy  - Back muscle strain with catching self on RW   - Lidocaine patch to back and BL deltoids  - Ibuprofen 400mg x1 dose     #Experiencing R mandible and occipital numbness, with associated hair loss- outpatient follow up with Rheumatology    #Posttnasal drip--ENT review 11/7, declined scope, continue flonase     #pAFIB/Rt Ij thrombus  - Metoprolol 25mg BID and lovenox  ---Eliquis 5mg BID - tolerating well   -- RUE venous duplex check for resolution of know DVT 12/11    #Chronic Tinnitus   - ENT review and recs appreciate, Patient already made ENT appointment for f/u    #Lymphedema both legs -chronic and Rt IJ thrombus  - ACE Wrap BL LEs  - BL LE doppler negative for DVT  - BL UE doppler with chronic thrombotic changes in RIJ     #Transaminitis --LFT Down trending   - Secondary to acute illness and hypotension at [facility]  - Outpatient follow up     #Leucocytosis--steroid related, continue monitoring     #Neuropathy  - Gabapentin 300 mg BID, will consider increasing dose, increase to TID 11/10  --Work on motor strengthening, priority for UE as preferred by patient   - Vit b12 >2,000 in 9/2023  --Will consider Rhuematology f/u if needed    #Sleep/Mood  - Melatonin 6mg at HS  - Psych rec Xanax 0.25mg PRN    #Skin  - Skin: Left lower abdomen ruptured blister 3.0 x 1.0, stage 2 pressure injury to right buttock 4 x1 and left buttock 3x1, stage 2 pressure injury ti right inner thigh 0.2 x 0.2, right groin wound 10.5 x 2.0,   Wound care review, Left groin wound 11/15--- 0.6x1.8.0.1cm, no slough, healthy base  -- MAD to skin folds- Nystatin to submammary and abdominal pannus BID  -- MAD to L groin- cleanse with NS, Triad cream daily  -- Mad to R groin- Nystatin powder daily   -- R groin wound-- aquacel packing, Triad to periwound, Allevyn foam- daily and PRN   - Pressure injury/Skin: OOB to Chair, PT/OT   - Offload pressure, low air loss support surfaces, T&P every 2 hours   --Wound care consult-10/9 -Multiple wounds--perineal and buttock/sacral, recs appreciated   --Suggest Continue treatments as below:   Twice daily & PRN Normal Saline Cleanse of abdominal pannus & breast folds, perineal, Left & Right inner buttock erosions.  Pat thoroughly dry.   Apply Desitin generously twice daily (& PRN) to perineal, buttocks, and left groin erosions, leave open to air.    Apply Nystatin powder to abdominal pannus and breast folds.  Suggest use of Interdry cloths in folds to 'wick' moisture.  Suggest daily Normal Saline Cleanse of right groin surgical wound, pat dry.  Apply Cavillon film barrier to intact periwound skin.  Place Aquacell strip over open area, cover with foam dressing.  - Wound care following     #Pain Mgmt   - Tylenol PRN   - Gabapentin 300mg at HS     #GI/Bowel Mgmt/Hx of alternating bowel movements  - Pantoprazole  - PRN: Maalox   --Lactobacillus BID     #/Bladder Mgmt   -Spontaneously voiding   - UA (11/3) negative  - +UTI- Amoxicillin  completed    #FEN   #Dysphagia  - Diet - Minced & Moist  [CC]    - Dysphagia- SLP evaluation appreciated     #Health maintenance  - Folic Acid   - MVI  - Ocean nasal spray    # Difficulty with access to peripheral veins-- Blood draws from Left arm Medline --RUE F/U venous duplex 12/11    #Precautions / PROPHYLAXIS:   - Falls  - ortho: Weight bearing status: WBAT   - Lungs: Aspiration, Incentive Spirometer   - DVT PPX: Eliquis 5 mg BID   ----------------------------------------  12/11- --Labs CBC mild anemia, normal renal function, LFT elevated, downtrending overall unremarkable   ----------------------------------------  Liaison with other providers/agencies    PEER TO PEER with Dr Tripp (patient's insurance company)  * Peer to Peer completed, insurance approval retrospectively given to cover from 11/14 to 11/20, Insurance co awaits updated notes to determine further approval of coverage   SW team to send updated clinical and functional notes to medical insurance company  ----------------------------------------  IDT conference on 12/5  Social Work: Awaiting insurance response on clinicals  SLP: -- n/a  OT: Setup for eating/grooming. min assistance for ADLs, mod assistance for toileting  Shower transfers mod assistance x 1  Independent with bed mobility  Ambulating via light gate 100--120ft   Amb 65 ft with bariatric RW and WCF with min A. 6 inch step mod A x2.   Contact guard for stand pivot transfers  Goal--min assist with ADLs  RT--Engaging for activity planning and relaxation exercises  DME: Bariatric RW, WC, drop arm commode  Barriers: New fevers  TDD: 12/20 to home.  ----------------------------------------  OUTPATIENT/FOLLOW UP:    Trae Whitney  Pulmonary Disease  [address], [address] [place] [address]  Phone: [phone]  Fax: [phone]  Follow Up Time: 2 weeks    Ryanne Allen  Rheumatology  [address] [address]  Phone: [phone]  Fax: [phone]  Follow Up Time: 1 week    Kyle Pierce  Internal Medicine  [facility] [place] [address], [address]  [place] [address]  Phone: [phone]  Fax: [phone]  Follow Up Time: 2 weeks    Addy Powers  Pulmonary Disease  [address] [facility] [address]  [place], [place] [id number]  Phone: [phone]  Fax: [phone]  Follow Up Time:     Kwame Hawley  Critical Care Medicine  [address] [facility] [address]  [place] [address]  Phone: [phone]  Fax: [phone]  Follow Up Time:     Neena Borrego  Rheumatology  [address] [facility], Floor 3  [place] [address]  Phone: [phone]  Fax: [phone]  Follow Up Time:    [facility]  [address]  Scheduled Appointment: 09/13/2023  2:40 PM      Non critical care  Time based billing  Spent 61 mins, patient review, discussion with patient and family, observation in therapy. and care co ordination  Assessment/Plan:  ALIZA FOLEY is a 62 year old female with PMH of pAFIB and sciatica; who presented to [facility] ED with SOB, and was found to have groundglass opacities and interstitial lung disease. She was treated with empiric Vancomycin and Zosyn. She underwent a bronchoscopy with bronchoalveolar lavage and pulse steroids. She was given IV diuretics for increased pulmonary congestion, but her respiratory status continued to worsen.  On 9/13, she was transferred to [facility] for ECMO requirements. She was further treated with Plasmapheresis, Rituximab and high-dose steroids, with significant improvement. Her overall hospital course was complicated by thrombocytopenia (s/p several platelet transfusions), leukocytosis (infectious workup entirely negative- s/p Vanco and Ceftriaxone), AFIB with RVR (resolved with Metoprolol), dysphagia (requiring NGT), transaminitis (secondary to acute illness and hypotension),  non-occlusive RIJ DVT (started on Lovenox). Patient now admitted for a multidisciplinary rehab program. 10-05-23 @ 13:18    * Sustained functional improvement including progress with Wt bearing LE and improvement with ability at transfers, concentrate on prox LE muscle strengthening and endurance exercises and transfers  * Ambulating increasing distance with walker, will concentrate on hip extensor strengthening   * Making further progress, increased ambulatory distance       #Interstitial Lung Disease and Mixed connective tissue disease  - Gait Instability, ADL impairments and Functional impairments: start Comprehensive Rehab Program of PT/OT/SLP - 3 hours a day, 5 days a week  - P&O as needed   - Non-specific Interstitial Lung Disease  - Requiring ECMO   - Improvement s/p Plasmapheresis, Rituximab and high- dose steroids   - Albuterol/Ipratropium Nebulizer every 6 hours  - Mepron 1500mg daily  - PO Medrol ( due to liver dysfunction): Plan will be to taper by ~20% every 2 weeks    Medrol   64mg x 2 weeks   56mg x 2 weeks  44mg x 2 weeks   36mg x 2 weeks - Follow up Outpatient   - Plan to wean 02 as tolerated, Continue tapering oxygen,  -  CT Chest noted 11/10---Resp f/u review  11/14, appreciated  They reports sustained improvement, clinical (normal oxy sats on R/A, sustained progress with oxy tapering), and radiological (no acute findings on recent CT Chest, rather residual chronic infiltrative diesease noted, with recommendation to repeat CT after Acute rehab treatment     #Fever  - Recent UTI on Bactrim (since DCd)  - UCX with enterococcus faecalis  - 101.4 fever overnight (12/4-12/5)  - Lactate of 2.5- patient refused IV fluids   - Start Zosyn IVPB 12/6 (pt agreeable)  - RVP negative  - F/U blood cultures, CXR  - ID consult and Pulm re-eval (no respiratory symptoms)  - Pulm recs: continue to monitor   - ID recs: CT chest- stable (12/5)   - Repeat Lactate of 3.1 - agreeable to IV fluids- s/p 1000mL bolus   - BL LE doppler- negative   --UTI--treated     #Cankre sore  - Lidocaine swish and spit TID x3 days     #Knee buckling  - 12/1: S/p guided fall with physical therapy  - Back muscle strain with catching self on RW   - Lidocaine patch to back and BL deltoids  - Ibuprofen 400mg x1 dose     #Experiencing R mandible and occipital numbness, with associated hair loss- outpatient follow up with Rheumatology    #Posttnasal drip--ENT review 11/7, declined scope, continue flonase     #pAFIB/Rt Ij thrombus  - Metoprolol 25mg BID and lovenox  ---Eliquis 5mg BID - tolerating well   -- RUE venous duplex check for resolution of know DVT 12/11    #Chronic Tinnitus   - ENT review and recs appreciate, Patient already made ENT appointment for f/u    #Lymphedema both legs -chronic and Rt IJ thrombus  - ACE Wrap BL LEs  - BL LE doppler negative for DVT  - BL UE doppler with chronic thrombotic changes in RIJ     #Transaminitis --LFT Down trending   - Secondary to acute illness and hypotension at [facility]  - Outpatient follow up     #Leucocytosis--steroid related, continue monitoring     #Neuropathy  - Gabapentin 300 mg BID, will consider increasing dose, increase to TID 11/10  --Work on motor strengthening, priority for UE as preferred by patient   - Vit b12 >2,000 in 9/2023  --Will consider Rhuematology f/u if needed    #Sleep/Mood  - Melatonin 6mg at HS  - Psych rec Xanax 0.25mg PRN    #Skin  - Skin: Left lower abdomen ruptured blister 3.0 x 1.0, stage 2 pressure injury to right buttock 4 x1 and left buttock 3x1, stage 2 pressure injury ti right inner thigh 0.2 x 0.2, right groin wound 10.5 x 2.0,   Wound care review, Left groin wound 11/15--- 0.6x1.8.0.1cm, no slough, healthy base  -- MAD to skin folds- Nystatin to submammary and abdominal pannus BID  -- MAD to L groin- cleanse with NS, Triad cream daily  -- Mad to R groin- Nystatin powder daily   -- R groin wound-- aquacel packing, Triad to periwound, Allevyn foam- daily and PRN   - Pressure injury/Skin: OOB to Chair, PT/OT   - Offload pressure, low air loss support surfaces, T&P every 2 hours   --Wound care consult-10/9 -Multiple wounds--perineal and buttock/sacral, recs appreciated   --Suggest Continue treatments as below:   Twice daily & PRN Normal Saline Cleanse of abdominal pannus & breast folds, perineal, Left & Right inner buttock erosions.  Pat thoroughly dry.   Apply Desitin generously twice daily (& PRN) to perineal, buttocks, and left groin erosions, leave open to air.    Apply Nystatin powder to abdominal pannus and breast folds.  Suggest use of Interdry cloths in folds to 'wick' moisture.  Suggest daily Normal Saline Cleanse of right groin surgical wound, pat dry.  Apply Cavillon film barrier to intact periwound skin.  Place Aquacell strip over open area, cover with foam dressing.  - Wound care following     #Pain Mgmt   - Tylenol PRN   - Gabapentin 300mg at HS     #GI/Bowel Mgmt/Hx of alternating bowel movements  - Pantoprazole  - PRN: Maalox   --Lactobacillus BID     #/Bladder Mgmt   -Spontaneously voiding   - UA (11/3) negative  - +UTI- Amoxicillin  completed    #FEN   #Dysphagia  - Diet - Minced & Moist  [CC]    - Dysphagia- SLP evaluation appreciated     #Health maintenance  - Folic Acid   - MVI  - Ocean nasal spray    # Difficulty with access to peripheral veins-- Blood draws from Left arm Medline --RUE F/U venous duplex 12/11    #Precautions / PROPHYLAXIS:   - Falls  - ortho: Weight bearing status: WBAT   - Lungs: Aspiration, Incentive Spirometer   - DVT PPX: Eliquis 5 mg BID   ----------------------------------------  12/11- --Labs CBC mild anemia, normal renal function, LFT elevated, downtrending overall unremarkable   ----------------------------------------  Liaison with other providers/agencies    PEER TO PEER with Dr Tripp (patient's insurance company)  * Peer to Peer completed, insurance approval retrospectively given to cover from 11/14 to 11/20, Insurance co awaits updated notes to determine further approval of coverage   SW team to send updated clinical and functional notes to medical insurance company  ----------------------------------------  IDT conference on 12/5  Social Work: Awaiting insurance response on clinicals  SLP: -- n/a  OT: Setup for eating/grooming. min assistance for ADLs, mod assistance for toileting  Shower transfers mod assistance x 1  Independent with bed mobility  Ambulating via light gate 100--120ft   Amb 65 ft with bariatric RW and WCF with min A. 6 inch step mod A x2.   Contact guard for stand pivot transfers  Goal--min assist with ADLs  RT--Engaging for activity planning and relaxation exercises  DME: Bariatric RW, WC, drop arm commode  Barriers: New fevers  TDD: 12/20 to home.  ----------------------------------------  OUTPATIENT/FOLLOW UP:    Trae Whitney  Pulmonary Disease  [address], [address] [place] [address]  Phone: [phone]  Fax: [phone]  Follow Up Time: 2 weeks    Ryanne Allen  Rheumatology  [address] [address]  Phone: [phone]  Fax: [phone]  Follow Up Time: 1 week    Kyle Pierce  Internal Medicine  [facility]7 [address] [place] [address], [address]  [place] [address]  Phone: [phone]  Fax: [phone]  Follow Up Time: 2 weeks    Addy Powers  Pulmonary Disease  [address] [facility], [facility] [address]  [place], [place] [id number]  Phone: [phone]  Fax: [phone]  Follow Up Time:     Kwame Hawley  Critical Care Medicine  [address] [facility], [facility] [address]  [place] [address]  Phone: [phone]  Fax: [phone]  Follow Up Time:     Neena Borrego  Rheumatology  [address] [facility], [address]  [place] [address]  Phone: [phone]  Fax: [phone]  Follow Up Time:    [facility]  [address]  Scheduled Appointment: 09/13/2023  2:40 PM      Non critical care  Time based billing  Spent 61 mins, patient review, discussion with patient and family, observation in therapy. and care co ordination

## 2023-12-14 LAB
ALBUMIN SERPL ELPH-MCNC: 2.8 G/DL — LOW (ref 3.3–5)
ALBUMIN SERPL ELPH-MCNC: 2.8 G/DL — LOW (ref 3.3–5)
ALP SERPL-CCNC: 114 U/L — SIGNIFICANT CHANGE UP (ref 40–120)
ALP SERPL-CCNC: 114 U/L — SIGNIFICANT CHANGE UP (ref 40–120)
ALT FLD-CCNC: 48 U/L — HIGH (ref 10–45)
ALT FLD-CCNC: 48 U/L — HIGH (ref 10–45)
ANION GAP SERPL CALC-SCNC: 8 MMOL/L — SIGNIFICANT CHANGE UP (ref 5–17)
ANION GAP SERPL CALC-SCNC: 8 MMOL/L — SIGNIFICANT CHANGE UP (ref 5–17)
AST SERPL-CCNC: 24 U/L — SIGNIFICANT CHANGE UP (ref 10–40)
AST SERPL-CCNC: 24 U/L — SIGNIFICANT CHANGE UP (ref 10–40)
BASOPHILS # BLD AUTO: 0.03 K/UL — SIGNIFICANT CHANGE UP (ref 0–0.2)
BASOPHILS # BLD AUTO: 0.03 K/UL — SIGNIFICANT CHANGE UP (ref 0–0.2)
BASOPHILS NFR BLD AUTO: 0.2 % — SIGNIFICANT CHANGE UP (ref 0–2)
BASOPHILS NFR BLD AUTO: 0.2 % — SIGNIFICANT CHANGE UP (ref 0–2)
BILIRUB SERPL-MCNC: 0.7 MG/DL — SIGNIFICANT CHANGE UP (ref 0.2–1.2)
BILIRUB SERPL-MCNC: 0.7 MG/DL — SIGNIFICANT CHANGE UP (ref 0.2–1.2)
BUN SERPL-MCNC: 22 MG/DL — SIGNIFICANT CHANGE UP (ref 7–23)
BUN SERPL-MCNC: 22 MG/DL — SIGNIFICANT CHANGE UP (ref 7–23)
CALCIUM SERPL-MCNC: 9.3 MG/DL — SIGNIFICANT CHANGE UP (ref 8.4–10.5)
CALCIUM SERPL-MCNC: 9.3 MG/DL — SIGNIFICANT CHANGE UP (ref 8.4–10.5)
CHLORIDE SERPL-SCNC: 105 MMOL/L — SIGNIFICANT CHANGE UP (ref 96–108)
CHLORIDE SERPL-SCNC: 105 MMOL/L — SIGNIFICANT CHANGE UP (ref 96–108)
CO2 SERPL-SCNC: 27 MMOL/L — SIGNIFICANT CHANGE UP (ref 22–31)
CO2 SERPL-SCNC: 27 MMOL/L — SIGNIFICANT CHANGE UP (ref 22–31)
CREAT SERPL-MCNC: 0.8 MG/DL — SIGNIFICANT CHANGE UP (ref 0.5–1.3)
CREAT SERPL-MCNC: 0.8 MG/DL — SIGNIFICANT CHANGE UP (ref 0.5–1.3)
CRP SERPL-MCNC: <3 MG/L — SIGNIFICANT CHANGE UP
CRP SERPL-MCNC: <3 MG/L — SIGNIFICANT CHANGE UP
EGFR: 83 ML/MIN/1.73M2 — SIGNIFICANT CHANGE UP
EGFR: 83 ML/MIN/1.73M2 — SIGNIFICANT CHANGE UP
EOSINOPHIL # BLD AUTO: 0 K/UL — SIGNIFICANT CHANGE UP (ref 0–0.5)
EOSINOPHIL # BLD AUTO: 0 K/UL — SIGNIFICANT CHANGE UP (ref 0–0.5)
EOSINOPHIL NFR BLD AUTO: 0 % — SIGNIFICANT CHANGE UP (ref 0–6)
EOSINOPHIL NFR BLD AUTO: 0 % — SIGNIFICANT CHANGE UP (ref 0–6)
ERYTHROCYTE [SEDIMENTATION RATE] IN BLOOD: 7 MM/HR — SIGNIFICANT CHANGE UP (ref 0–20)
ERYTHROCYTE [SEDIMENTATION RATE] IN BLOOD: 7 MM/HR — SIGNIFICANT CHANGE UP (ref 0–20)
GLUCOSE SERPL-MCNC: 157 MG/DL — HIGH (ref 70–99)
GLUCOSE SERPL-MCNC: 157 MG/DL — HIGH (ref 70–99)
HCT VFR BLD CALC: 33.5 % — LOW (ref 34.5–45)
HCT VFR BLD CALC: 33.5 % — LOW (ref 34.5–45)
HGB BLD-MCNC: 10.5 G/DL — LOW (ref 11.5–15.5)
HGB BLD-MCNC: 10.5 G/DL — LOW (ref 11.5–15.5)
IMM GRANULOCYTES NFR BLD AUTO: 1.6 % — HIGH (ref 0–0.9)
IMM GRANULOCYTES NFR BLD AUTO: 1.6 % — HIGH (ref 0–0.9)
LACTATE SERPL-SCNC: 3.6 MMOL/L — HIGH (ref 0.7–2)
LACTATE SERPL-SCNC: 3.6 MMOL/L — HIGH (ref 0.7–2)
LYMPHOCYTES # BLD AUTO: 0.35 K/UL — LOW (ref 1–3.3)
LYMPHOCYTES # BLD AUTO: 0.35 K/UL — LOW (ref 1–3.3)
LYMPHOCYTES # BLD AUTO: 2.7 % — LOW (ref 13–44)
LYMPHOCYTES # BLD AUTO: 2.7 % — LOW (ref 13–44)
MCHC RBC-ENTMCNC: 26.2 PG — LOW (ref 27–34)
MCHC RBC-ENTMCNC: 26.2 PG — LOW (ref 27–34)
MCHC RBC-ENTMCNC: 31.3 GM/DL — LOW (ref 32–36)
MCHC RBC-ENTMCNC: 31.3 GM/DL — LOW (ref 32–36)
MCV RBC AUTO: 83.5 FL — SIGNIFICANT CHANGE UP (ref 80–100)
MCV RBC AUTO: 83.5 FL — SIGNIFICANT CHANGE UP (ref 80–100)
MONOCYTES # BLD AUTO: 0.69 K/UL — SIGNIFICANT CHANGE UP (ref 0–0.9)
MONOCYTES # BLD AUTO: 0.69 K/UL — SIGNIFICANT CHANGE UP (ref 0–0.9)
MONOCYTES NFR BLD AUTO: 5.4 % — SIGNIFICANT CHANGE UP (ref 2–14)
MONOCYTES NFR BLD AUTO: 5.4 % — SIGNIFICANT CHANGE UP (ref 2–14)
NEUTROPHILS # BLD AUTO: 11.49 K/UL — HIGH (ref 1.8–7.4)
NEUTROPHILS # BLD AUTO: 11.49 K/UL — HIGH (ref 1.8–7.4)
NEUTROPHILS NFR BLD AUTO: 90.1 % — HIGH (ref 43–77)
NEUTROPHILS NFR BLD AUTO: 90.1 % — HIGH (ref 43–77)
NRBC # BLD: 0 /100 WBCS — SIGNIFICANT CHANGE UP (ref 0–0)
NRBC # BLD: 0 /100 WBCS — SIGNIFICANT CHANGE UP (ref 0–0)
PLATELET # BLD AUTO: 367 K/UL — SIGNIFICANT CHANGE UP (ref 150–400)
PLATELET # BLD AUTO: 367 K/UL — SIGNIFICANT CHANGE UP (ref 150–400)
POTASSIUM SERPL-MCNC: 3.8 MMOL/L — SIGNIFICANT CHANGE UP (ref 3.5–5.3)
POTASSIUM SERPL-MCNC: 3.8 MMOL/L — SIGNIFICANT CHANGE UP (ref 3.5–5.3)
POTASSIUM SERPL-SCNC: 3.8 MMOL/L — SIGNIFICANT CHANGE UP (ref 3.5–5.3)
POTASSIUM SERPL-SCNC: 3.8 MMOL/L — SIGNIFICANT CHANGE UP (ref 3.5–5.3)
PROT SERPL-MCNC: 5.5 G/DL — LOW (ref 6–8.3)
PROT SERPL-MCNC: 5.5 G/DL — LOW (ref 6–8.3)
RBC # BLD: 4.01 M/UL — SIGNIFICANT CHANGE UP (ref 3.8–5.2)
RBC # BLD: 4.01 M/UL — SIGNIFICANT CHANGE UP (ref 3.8–5.2)
RBC # FLD: 15.7 % — HIGH (ref 10.3–14.5)
RBC # FLD: 15.7 % — HIGH (ref 10.3–14.5)
SODIUM SERPL-SCNC: 140 MMOL/L — SIGNIFICANT CHANGE UP (ref 135–145)
SODIUM SERPL-SCNC: 140 MMOL/L — SIGNIFICANT CHANGE UP (ref 135–145)
WBC # BLD: 12.77 K/UL — HIGH (ref 3.8–10.5)
WBC # BLD: 12.77 K/UL — HIGH (ref 3.8–10.5)
WBC # FLD AUTO: 12.77 K/UL — HIGH (ref 3.8–10.5)
WBC # FLD AUTO: 12.77 K/UL — HIGH (ref 3.8–10.5)

## 2023-12-14 PROCEDURE — 99233 SBSQ HOSP IP/OBS HIGH 50: CPT

## 2023-12-14 PROCEDURE — 99232 SBSQ HOSP IP/OBS MODERATE 35: CPT

## 2023-12-14 RX ORDER — ALPRAZOLAM 0.25 MG
0.25 TABLET ORAL EVERY 6 HOURS
Refills: 0 | Status: DISCONTINUED | OUTPATIENT
Start: 2023-12-14 | End: 2023-12-18

## 2023-12-14 RX ADMIN — LIDOCAINE 1 PATCH: 4 CREAM TOPICAL at 08:43

## 2023-12-14 RX ADMIN — LIDOCAINE 2 PATCH: 4 CREAM TOPICAL at 20:45

## 2023-12-14 RX ADMIN — ZINC OXIDE 1 APPLICATION(S): 200 OINTMENT TOPICAL at 08:46

## 2023-12-14 RX ADMIN — Medication 12.5 MILLIGRAM(S): at 20:37

## 2023-12-14 RX ADMIN — Medication 1 TABLET(S): at 10:58

## 2023-12-14 RX ADMIN — APIXABAN 5 MILLIGRAM(S): 2.5 TABLET, FILM COATED ORAL at 20:37

## 2023-12-14 RX ADMIN — Medication 36 MILLIGRAM(S): at 08:41

## 2023-12-14 RX ADMIN — APIXABAN 5 MILLIGRAM(S): 2.5 TABLET, FILM COATED ORAL at 08:41

## 2023-12-14 RX ADMIN — Medication 500 MILLIGRAM(S): at 20:38

## 2023-12-14 RX ADMIN — LIDOCAINE 1 PATCH: 4 CREAM TOPICAL at 20:45

## 2023-12-14 RX ADMIN — LIDOCAINE 2 PATCH: 4 CREAM TOPICAL at 19:00

## 2023-12-14 RX ADMIN — ZINC OXIDE 1 APPLICATION(S): 200 OINTMENT TOPICAL at 15:12

## 2023-12-14 RX ADMIN — Medication 1 MILLIGRAM(S): at 20:37

## 2023-12-14 RX ADMIN — GABAPENTIN 300 MILLIGRAM(S): 400 CAPSULE ORAL at 15:11

## 2023-12-14 RX ADMIN — NYSTATIN CREAM 1 APPLICATION(S): 100000 CREAM TOPICAL at 08:46

## 2023-12-14 RX ADMIN — Medication 1 DROP(S): at 08:43

## 2023-12-14 RX ADMIN — ATOVAQUONE 1500 MILLIGRAM(S): 750 SUSPENSION ORAL at 20:36

## 2023-12-14 RX ADMIN — LIDOCAINE 1 PATCH: 4 CREAM TOPICAL at 19:00

## 2023-12-14 RX ADMIN — GABAPENTIN 300 MILLIGRAM(S): 400 CAPSULE ORAL at 08:41

## 2023-12-14 RX ADMIN — LIDOCAINE 2 PATCH: 4 CREAM TOPICAL at 08:42

## 2023-12-14 RX ADMIN — PANTOPRAZOLE SODIUM 40 MILLIGRAM(S): 20 TABLET, DELAYED RELEASE ORAL at 08:41

## 2023-12-14 RX ADMIN — GABAPENTIN 300 MILLIGRAM(S): 400 CAPSULE ORAL at 20:36

## 2023-12-14 RX ADMIN — Medication 1 TABLET(S): at 20:37

## 2023-12-14 RX ADMIN — Medication 1 TABLET(S): at 20:38

## 2023-12-14 RX ADMIN — Medication 12.5 MILLIGRAM(S): at 08:41

## 2023-12-14 NOTE — PROGRESS NOTE ADULT - SUBJECTIVE AND OBJECTIVE BOX
[facility]  Dr. Yamilex Sanders  Progress Note    Patient is a 62y old  Female who presents with a chief complaint of Interstitial Lung Disease (12 Dec 2023 15:13)      Patient seen and examined. Partner at bedside. Patient denies any fevers, cough, SOB, dysuria, diarrhea or abdominal pain. No signs of infection elucidated. Patient does not believe that she is dehydrated but will attempt to drink more water. Overall doing better but feels she needs more help physically prior to discharge.       ALLERGIES:  No Known Allergies    MEDICATIONS  (STANDING):  amoxicillin 500 milliGRAM(s) Oral three times a day  apixaban 5 milliGRAM(s) Oral every 12 hours  artificial  tears Solution 1 Drop(s) Both EYES every 12 hours  ascorbic acid 500 milliGRAM(s) Oral daily  atovaquone  Suspension 1500 milliGRAM(s) Oral daily  dextrose 5%. 1000 milliLiter(s) (100 mL/Hr) IV Continuous <Continuous>  dextrose 5%. 1000 milliLiter(s) (50 mL/Hr) IV Continuous <Continuous>  dextrose 50% Injectable 12.5 Gram(s) IV Push once  dextrose 50% Injectable 25 Gram(s) IV Push once  dextrose 50% Injectable 25 Gram(s) IV Push once  folic acid 1 milliGRAM(s) Oral daily  gabapentin 300 milliGRAM(s) Oral three times a day  glucagon  Injectable 1 milliGRAM(s) IntraMuscular once  lactobacillus acidophilus 1 Tablet(s) Oral two times a day with meals  lidocaine   4% Patch 2 Patch Transdermal <User Schedule>  lidocaine   4% Patch 1 Patch Transdermal <User Schedule>  melatonin 6 milliGRAM(s) Oral at bedtime  methylPREDNISolone 36 milliGRAM(s) Oral daily  metoprolol tartrate 12.5 milliGRAM(s) Oral two times a day  multivitamin 1 Tablet(s) Oral daily  nystatin Powder 1 Application(s) Topical two times a day  pantoprazole    Tablet 40 milliGRAM(s) Oral before breakfast  polyethylene glycol 3350 17 Gram(s) Oral <User Schedule>  sodium chloride 0.9% Bolus 1000 milliLiter(s) IV Bolus once  sodium chloride 0.9% lock flush 5 milliLiter(s) IV Push two times a day  sodium chloride 0.9%. 1000 milliLiter(s) (100 mL/Hr) IV Continuous <Continuous>  zinc oxide 40% Paste 1 Application(s) Topical three times a day    MEDICATIONS  (PRN):  acetaminophen     Tablet .. 650 milliGRAM(s) Oral every 6 hours PRN Temp greater or equal to 38C (100.4F), Mild Pain (1 - 3), Moderate Pain (4 - 6)  albuterol/ipratropium for Nebulization 3 milliLiter(s) Nebulizer every 6 hours PRN Shortness of Breath and/or Wheezing  ALPRAZolam 0.25 milliGRAM(s) Oral every 6 hours PRN Anxiety  aluminum hydroxide/magnesium hydroxide/simethicone Suspension 30 milliLiter(s) Oral every 4 hours PRN Dyspepsia  ammonium lactate 12% Lotion 1 Application(s) Topical every 12 hours PRN BL feet dryness  baclofen 5 milliGRAM(s) Oral every 8 hours PRN Musculoskeletal Pain  benzocaine/menthol Lozenge 1 Lozenge Oral two times a day PRN Sore Throat  benzonatate 100 milliGRAM(s) Oral three times a day PRN Cough  bisacodyl 5 milliGRAM(s) Oral daily PRN Constipation  dextrose Oral Gel 15 Gram(s) Oral once PRN Blood Glucose LESS THAN 70 milliGRAM(s)/deciliter  hydrocortisone hemorrhoidal Suppository 1 Suppository(s) Rectal two times a day PRN Hemorrhoids  loperamide 2 milliGRAM(s) Oral three times a day PRN Diarrhea  SIMETHICONE SOFTGELS 250 milliGRAM(s),SIMETHICONE SOFTGELS 250 milliGRAM(s) 250 milliGRAM(s) Oral three times a day PRN for gas  sodium chloride 0.65% Nasal 1 Spray(s) Both Nostrils every 8 hours PRN Nasal Congestion    Vital Signs Last 24 Hrs  T(C): 36.9 (14 Dec 2023 08:29), Max: 36.9 (14 Dec 2023 08:29)  T(F): 98.4 (14 Dec 2023 08:29), Max: 98.4 (14 Dec 2023 08:29)  HR: 84 (14 Dec 2023 08:29) (84 - 88)  BP: 114/73 (14 Dec 2023 08:29) (107/69 - 114/73)  BP(mean): --  RR: 16 (14 Dec 2023 08:29) (16 - 16)  SpO2: 92% (14 Dec 2023 08:29) (92% - 92%)    Parameters below as of 14 Dec 2023 08:29  Patient On (Oxygen Delivery Method): room air        PHYSICAL EXAM:  General: NAD, well appearing   ENT: MMM, no scleral icterus  Neck: Supple, No JVD  Lungs: Clear to auscultation bilaterally, no wheezes, rales, rhonchi  Cardio: RRR, S1/S2  Abdomen: Soft, Nontender, Nondistended; Bowel sounds present  Extremities: No calf tenderness, No pitting edema    LABS:                                   10.5   12.77 )-----------( 367      ( 14 Dec 2023 11:46 )             33.5   12-14    140  |  105  |  22  ----------------------------<  157<H>  3.8   |  27  |  0.80    Ca    9.3      14 Dec 2023 11:46    TPro  5.5<L>  /  Alb  2.8<L>  /  TBili  0.7  /  DBili  x   /  AST  24  /  ALT  48<H>  /  AlkPhos  114  12-14      Urinalysis Basic - ( 11 Dec 2023 08:51 )    Color: x / Appearance: x / SG: x / pH: x  Gluc: 130 mg/dL / Ketone: x  / Bili: x / Urobili: x   Blood: x / Protein: x / Nitrite: x   Leuk Esterase: x / RBC: x / WBC x   Sq Epi: x / Non Sq Epi: x / Bacteria: x            RADIOLOGY & ADDITIONAL TESTS:    Care Discussed with Consultants/Other Providers: yes, rehab   [facility]  Dr. Yamilex Sanders  Progress Note    Patient is a 62y old  Female who presents with a chief complaint of Interstitial Lung Disease (12 Dec 2023 15:13)      Patient seen and examined. Partner at bedside. Patient denies any fevers, cough, SOB, dysuria, diarrhea or abdominal pain. No signs of infection elucidated. Patient does not believe that she is dehydrated but will attempt to drink more water. Overall doing better but feels she needs more help physically prior to discharge.       ALLERGIES:  No Known Allergies    MEDICATIONS  (STANDING):  amoxicillin 500 milliGRAM(s) Oral three times a day  apixaban 5 milliGRAM(s) Oral every 12 hours  artificial  tears Solution 1 Drop(s) Both EYES every 12 hours  ascorbic acid 500 milliGRAM(s) Oral daily  atovaquone  Suspension 1500 milliGRAM(s) Oral daily  dextrose 5%. 1000 milliLiter(s) (100 mL/Hr) IV Continuous <Continuous>  dextrose 5%. 1000 milliLiter(s) (50 mL/Hr) IV Continuous <Continuous>  dextrose 50% Injectable 12.5 Gram(s) IV Push once  dextrose 50% Injectable 25 Gram(s) IV Push once  dextrose 50% Injectable 25 Gram(s) IV Push once  folic acid 1 milliGRAM(s) Oral daily  gabapentin 300 milliGRAM(s) Oral three times a day  glucagon  Injectable 1 milliGRAM(s) IntraMuscular once  lactobacillus acidophilus 1 Tablet(s) Oral two times a day with meals  lidocaine   4% Patch 2 Patch Transdermal <User Schedule>  lidocaine   4% Patch 1 Patch Transdermal <User Schedule>  melatonin 6 milliGRAM(s) Oral at bedtime  methylPREDNISolone 36 milliGRAM(s) Oral daily  metoprolol tartrate 12.5 milliGRAM(s) Oral two times a day  multivitamin 1 Tablet(s) Oral daily  nystatin Powder 1 Application(s) Topical two times a day  pantoprazole    Tablet 40 milliGRAM(s) Oral before breakfast  polyethylene glycol 3350 17 Gram(s) Oral <User Schedule>  sodium chloride 0.9% Bolus 1000 milliLiter(s) IV Bolus once  sodium chloride 0.9% lock flush 5 milliLiter(s) IV Push two times a day  sodium chloride 0.9%. 1000 milliLiter(s) (100 mL/Hr) IV Continuous <Continuous>  zinc oxide 40% Paste 1 Application(s) Topical three times a day    MEDICATIONS  (PRN):  acetaminophen     Tablet .. 650 milliGRAM(s) Oral every 6 hours PRN Temp greater or equal to 38C (100.4F), Mild Pain (1 - 3), Moderate Pain (4 - 6)  albuterol/ipratropium for Nebulization 3 milliLiter(s) Nebulizer every 6 hours PRN Shortness of Breath and/or Wheezing  ALPRAZolam 0.25 milliGRAM(s) Oral every 6 hours PRN Anxiety  aluminum hydroxide/magnesium hydroxide/simethicone Suspension 30 milliLiter(s) Oral every 4 hours PRN Dyspepsia  ammonium lactate 12% Lotion 1 Application(s) Topical every 12 hours PRN BL feet dryness  baclofen 5 milliGRAM(s) Oral every 8 hours PRN Musculoskeletal Pain  benzocaine/menthol Lozenge 1 Lozenge Oral two times a day PRN Sore Throat  benzonatate 100 milliGRAM(s) Oral three times a day PRN Cough  bisacodyl 5 milliGRAM(s) Oral daily PRN Constipation  dextrose Oral Gel 15 Gram(s) Oral once PRN Blood Glucose LESS THAN 70 milliGRAM(s)/deciliter  hydrocortisone hemorrhoidal Suppository 1 Suppository(s) Rectal two times a day PRN Hemorrhoids  loperamide 2 milliGRAM(s) Oral three times a day PRN Diarrhea  SIMETHICONE SOFTGELS 250 milliGRAM(s),SIMETHICONE SOFTGELS 250 milliGRAM(s) 250 milliGRAM(s) Oral three times a day PRN for gas  sodium chloride 0.65% Nasal 1 Spray(s) Both Nostrils every 8 hours PRN Nasal Congestion    Vital Signs Last 24 Hrs  T(C): 36.9 (14 Dec 2023 08:29), Max: 36.9 (14 Dec 2023 08:29)  T(F): 98.4 (14 Dec 2023 08:29), Max: 98.4 (14 Dec 2023 08:29)  HR: 84 (14 Dec 2023 08:29) (84 - 88)  BP: 114/73 (14 Dec 2023 08:29) (107/69 - 114/73)  BP(mean): --  RR: 16 (14 Dec 2023 08:29) (16 - 16)  SpO2: 92% (14 Dec 2023 08:29) (92% - 92%)    Parameters below as of 14 Dec 2023 08:29  Patient On (Oxygen Delivery Method): room air        PHYSICAL EXAM:  General: NAD, well appearing   ENT: MMM, no scleral icterus  Neck: Supple, No JVD  Lungs: Clear to auscultation bilaterally, no wheezes, rales, rhonchi  Cardio: RRR, S1/S2  Abdomen: Soft, Nontender, Nondistended; Bowel sounds present  Extremities: No calf tenderness, No pitting edema    LABS:                                   10.5   12.77 )-----------( 367      ( 14 Dec 2023 11:46 )             33.5   12-14    140  |  105  |  22  ----------------------------<  157<H>  3.8   |  27  |  0.80    Ca    9.3      14 Dec 2023 11:46    TPro  5.5<L>  /  Alb  2.8<L>  /  TBili  0.7  /  DBili  x   /  AST  24  /  ALT  48<H>  /  AlkPhos  114  12-14      Urinalysis Basic - ( 11 Dec 2023 08:51 )    Color: x / Appearance: x / SG: x / pH: x  Gluc: 130 mg/dL / Ketone: x  / Bili: x / Urobili: x   Blood: x / Protein: x / Nitrite: x   Leuk Esterase: x / RBC: x / WBC x   Sq Epi: x / Non Sq Epi: x / Bacteria: x            RADIOLOGY & ADDITIONAL TESTS:    Care Discussed with Consultants/Other Providers: yes, rehab

## 2023-12-14 NOTE — PROGRESS NOTE ADULT - SUBJECTIVE AND OBJECTIVE BOX
SUBJECTIVE/ROS:  Patient seen and examined observed in therapy, partner at bed side  Report good night rest, but had struggled with coming down from bed to bedside commode for urine void last night  She is reporting mild residual urinary urgency, but had recently completed antibiotics for UTI  But plan is to continue voiding --bed side commode night and using the toilet during the day  Reports normal bowel and bladder function      ROS--No chest or abd pain, no palpitations nausea or vomiting, LBM 12/13    Therapy  Sustained improvement noted  Observed ambulating > 100ft with RW, CGA  Main deficit is with hip extension, hence requiring mod assist sit to stand from WC  Will continue strengthening of hip extensor    Elevated lactate, but no signs of acute infection, probably due to volume depletion  Will get labs--repeat lactate, crp esr cmp cbc     Vital Signs Last 24 Hrs  T(C): 36.9 (14 Dec 2023 08:29), Max: 36.9 (14 Dec 2023 08:29)  T(F): 98.4 (14 Dec 2023 08:29), Max: 98.4 (14 Dec 2023 08:29)  HR: 84 (14 Dec 2023 08:29) (84 - 88)  BP: 114/73 (14 Dec 2023 08:29) (107/69 - 114/73)  RR: 16 (14 Dec 2023 08:29) (16 - 16)  SpO2: 92% (14 Dec 2023 08:29) (92% - 92%)  O2 Parameters below as of 14 Dec 2023 08:29  Patient On (Oxygen Delivery Method): room air      PHYSICAL EXAM:  Gen -  Comfortable,  at bedside -normal oxy sat and subsequently self ambulating WC functional distance and with walker for increasing distance  Pulm - clear  Cardiovascular - HS i and II intermittently irregular  Abdomen - Soft, non tender,   Extremities - edema to b/l LE, no calf tenderness, LUE Med line    Neuro-  Cognitive - awake, alert, fully oriented,  Light tough sensation diffusely reduced on fingers and dorsal foot.   Motor -                    LEFT    UE - 4/5                    RIGHT UE - 4/5                     LEFT    LE - 3+/5, distally and 3/5 proximal                    RIGHT LE - Ankles PF 3/5 ,DF 2/5, Knee ext 3/5 Hips limited by pain Rt hip due to ulcer at ECHO access site   Sensory - decreased light tough sensation fingers and sole of foot, difusely  MSK: improvement with motor strength  Psychiatric - Mood stable, Affect WNL  Skin -- , Pressure injury on b/l buttock is healing, no discharge  Wound on right pito, continues to improve, very limited surface area and shallow  MMT--Able to contract B/L upper back muscles, EF/EE 4/5 distally, knee Est 3+/5      RECENT LABS/IMAGING                  10.9   7.49  )-----------( 325      ( 11 Dec 2023 08:51 )             35.6     12-11    138  |  104  |  19  ----------------------------<  130<H>  4.6   |  27  |  0.70    Ca    9.7      11 Dec 2023 08:51    TPro  5.6<L>  /  Alb  2.8<L>  /  TBili  0.6  /  DBili  x   /  AST  16  /  ALT  36  /  AlkPhos  119  12-11      Urinalysis Basic - ( 11 Dec 2023 08:51 )    Color: x / Appearance: x / SG: x / pH: x  Gluc: 130 mg/dL / Ketone: x  / Bili: x / Urobili: x   Blood: x / Protein: x / Nitrite: x   Leuk Esterase: x / RBC: x / WBC x   Sq Epi: x / Non Sq Epi: x / Bacteria: x    --await repeat labs    MEDICATIONS  (STANDING):  apixaban 5 milliGRAM(s) Oral every 12 hours  artificial  tears Solution 1 Drop(s) Both EYES every 12 hours  ascorbic acid 500 milliGRAM(s) Oral daily  atovaquone  Suspension 1500 milliGRAM(s) Oral daily  dextrose 5%. 1000 milliLiter(s) (100 mL/Hr) IV Continuous <Continuous>  dextrose 5%. 1000 milliLiter(s) (50 mL/Hr) IV Continuous <Continuous>  dextrose 50% Injectable 12.5 Gram(s) IV Push once  dextrose 50% Injectable 25 Gram(s) IV Push once  dextrose 50% Injectable 25 Gram(s) IV Push once  folic acid 1 milliGRAM(s) Oral daily  gabapentin 300 milliGRAM(s) Oral three times a day  glucagon  Injectable 1 milliGRAM(s) IntraMuscular once  lactobacillus acidophilus 1 Tablet(s) Oral two times a day with meals  lidocaine   4% Patch 2 Patch Transdermal <User Schedule>  lidocaine   4% Patch 1 Patch Transdermal <User Schedule>  melatonin 6 milliGRAM(s) Oral at bedtime  metoprolol tartrate 12.5 milliGRAM(s) Oral two times a day  multivitamin 1 Tablet(s) Oral daily  nystatin Powder 1 Application(s) Topical two times a day  pantoprazole    Tablet 40 milliGRAM(s) Oral before breakfast  polyethylene glycol 3350 17 Gram(s) Oral <User Schedule>  sodium chloride 0.9% Bolus 1000 milliLiter(s) IV Bolus once  sodium chloride 0.9% lock flush 5 milliLiter(s) IV Push two times a day  sodium chloride 0.9%. 1000 milliLiter(s) (100 mL/Hr) IV Continuous <Continuous>  zinc oxide 40% Paste 1 Application(s) Topical three times a day    MEDICATIONS  (PRN):  acetaminophen     Tablet .. 650 milliGRAM(s) Oral every 6 hours PRN Temp greater or equal to 38C (100.4F), Mild Pain (1 - 3), Moderate Pain (4 - 6)  albuterol/ipratropium for Nebulization 3 milliLiter(s) Nebulizer every 6 hours PRN Shortness of Breath and/or Wheezing  ALPRAZolam 0.25 milliGRAM(s) Oral every 6 hours PRN Anxiety  aluminum hydroxide/magnesium hydroxide/simethicone Suspension 30 milliLiter(s) Oral every 4 hours PRN Dyspepsia  ammonium lactate 12% Lotion 1 Application(s) Topical every 12 hours PRN BL feet dryness  baclofen 5 milliGRAM(s) Oral every 8 hours PRN Musculoskeletal Pain  benzocaine/menthol Lozenge 1 Lozenge Oral two times a day PRN Sore Throat  benzonatate 100 milliGRAM(s) Oral three times a day PRN Cough  bisacodyl 5 milliGRAM(s) Oral daily PRN Constipation  dextrose Oral Gel 15 Gram(s) Oral once PRN Blood Glucose LESS THAN 70 milliGRAM(s)/deciliter  hydrocortisone hemorrhoidal Suppository 1 Suppository(s) Rectal two times a day PRN Hemorrhoids  loperamide 2 milliGRAM(s) Oral three times a day PRN Diarrhea  SIMETHICONE SOFTGELS 250 milliGRAM(s),SIMETHICONE SOFTGELS 250 milliGRAM(s) 250 milliGRAM(s) Oral three times a day PRN for gas  sodium chloride 0.65% Nasal 1 Spray(s) Both Nostrils every 8 hours PRN Nasal Congestion

## 2023-12-14 NOTE — PROGRESS NOTE ADULT - ASSESSMENT
Assessment/Plan:  ALIZA FOLEY is a 62 year old female with PMH of pAFIB and sciatica; who presented to [facility] ED with SOB, and was found to have groundglass opacities and interstitial lung disease. She was treated with empiric Vancomycin and Zosyn. She underwent a bronchoscopy with bronchoalveolar lavage and pulse steroids. She was given IV diuretics for increased pulmonary congestion, but her respiratory status continued to worsen.  On 9/13, she was transferred to [facility] for ECMO requirements. She was further treated with Plasmapheresis, Rituximab and high-dose steroids, with significant improvement. Her overall hospital course was complicated by thrombocytopenia (s/p several platelet transfusions), leukocytosis (infectious workup entirely negative- s/p Vanco and Ceftriaxone), AFIB with RVR (resolved with Metoprolol), dysphagia (requiring NGT), transaminitis (secondary to acute illness and hypotension),  non-occlusive RIJ DVT (started on Lovenox). Patient now admitted for a multidisciplinary rehab program. 10-05-23 @ 13:18    * Sustained functional improvement--increasing distance with walker, after assistance with sit to stand  * Continue therapy on progressive ambulation and hip extensor strengthening  * Await outcome of expedited appeal to denial of acute rehab treatment  * Repeat lactate and other labs as noted, if elevated, will need IVF     #Interstitial Lung Disease and Mixed connective tissue disease  - Gait Instability, ADL impairments and Functional impairments: start Comprehensive Rehab Program of PT/OT/SLP - 3 hours a day, 5 days a week  - P&O as needed   - Non-specific Interstitial Lung Disease  - Requiring ECMO   - Improvement s/p Plasmapheresis, Rituximab and high- dose steroids   - Albuterol/Ipratropium Nebulizer every 6 hours  - Mepron 1500mg daily  - PO Medrol ( due to liver dysfunction): Plan will be to taper by ~20% every 2 weeks    Medrol   64mg x 2 weeks   56mg x 2 weeks  44mg x 2 weeks   36mg x 2 weeks - Follow up Outpatient   - Plan to wean 02 as tolerated, Continue tapering oxygen,  -  CT Chest noted 11/10---Resp f/u review  11/14, appreciated  They reports sustained improvement, clinical (normal oxy sats on R/A, sustained progress with oxy tapering), and radiological (no acute findings on recent CT Chest, rather residual chronic infiltrative diesease noted, with recommendation to repeat CT after Acute rehab treatment     #Fever  - Recent UTI on Bactrim (since DCd)  - UCX with enterococcus faecalis  - 101.4 fever overnight (12/4-12/5)  - Lactate of 2.5- patient refused IV fluids   - Start Zosyn IVPB 12/6 (pt agreeable)  - RVP negative  - F/U blood cultures, CXR  - ID consult and Pulm re-eval (no respiratory symptoms)  - Pulm recs: continue to monitor   - ID recs: CT chest- stable (12/5)   - Repeat Lactate of 3.1 - agreeable to IV fluids- s/p 1000mL bolus   - BL LE doppler- negative   --UTI--treated     #Cankre sore  - Lidocaine swish and spit TID x3 days     #Knee buckling  - 12/1: S/p guided fall with physical therapy  - Back muscle strain with catching self on RW   - Lidocaine patch to back and BL deltoids  - Ibuprofen 400mg x1 dose     #Experiencing R mandible and occipital numbness, with associated hair loss- outpatient follow up with Rheumatology    #Posttnasal drip--ENT review 11/7, declined scope, continue flonase     #pAFIB/Rt Ij thrombus  - Metoprolol 25mg BID and lovenox  ---Eliquis 5mg BID - tolerating well   -- RUE venous duplex check for resolution of know DVT 12/11    #Chronic Tinnitus   - ENT review and recs appreciate, Patient already made ENT appointment for f/u    #Lymphedema both legs -chronic and Rt IJ thrombus  - ACE Wrap BL LEs  - BL LE doppler negative for DVT  - BL UE doppler with chronic thrombotic changes in RIJ     #Transaminitis --LFT Down trending   - Secondary to acute illness and hypotension at [facility]  - Outpatient follow up     #Leucocytosis--steroid related, continue monitoring   # Elevated Lactate--f/u elevated lactate--f/u repeat level    #Neuropathy  - Gabapentin 300 mg BID, will consider increasing dose, increase to TID 11/10  --Work on motor strengthening, priority for UE as preferred by patient   - Vit b12 >2,000 in 9/2023  --Will consider Rhuematology f/u if needed    #Sleep/Mood  - Melatonin 6mg at HS  - Psych rec Xanax 0.25mg PRN    #Skin  - Skin: Left lower abdomen ruptured blister 3.0 x 1.0, stage 2 pressure injury to right buttock 4 x1 and left buttock 3x1, stage 2 pressure injury ti right inner thigh 0.2 x 0.2, right groin wound 10.5 x 2.0,   Wound care review, Left groin wound 11/15--- 0.6x1.8.0.1cm, no slough, healthy base  -- MAD to skin folds- Nystatin to submammary and abdominal pannus BID  -- MAD to L groin- cleanse with NS, Triad cream daily  -- Mad to R groin- Nystatin powder daily   -- R groin wound-- aquacel packing, Triad to periwound, Allevyn foam- daily and PRN    - Offload pressure, low air loss support surfaces, T&P every 2 hours   --Wound care consult-10/9 -Multiple wounds--perineal and buttock/sacral, recs appreciated   --Suggest Continue treatments as below:   Twice daily & PRN Normal Saline Cleanse of abdominal pannus & breast folds, perineal, Left & Right inner buttock erosions.  Pat thoroughly dry.   Apply Desitin generously twice daily (& PRN) to perineal, buttocks, and left groin erosions, leave open to air.    Apply Nystatin powder to abdominal pannus and breast folds.  Suggest use of Interdry cloths in folds to 'wick' moisture.  Suggest daily Normal Saline Cleanse of right groin surgical wound, pat dry.  Apply Cavillon film barrier to intact periwound skin.  Place Aquacell strip over open area, cover with foam dressing.  - Wound care following     #Pain Mgmt   - Tylenol PRN   - Gabapentin 300mg at HS     #GI/Bowel Mgmt/Hx of alternating bowel movements  - Pantoprazole  - PRN: Maalox   --Lactobacillus BID     #/Bladder Mgmt   -Spontaneously voiding   - UA (11/3) negative  - +UTI- Amoxicillin  completed  - 12/14---Elevated lactate--f/u repeat level and other labs     #FEN   #Dysphagia  - Diet - Minced & Moist  [CC]    - Dysphagia- SLP evaluation appreciated     #Health maintenance  - Folic Acid   - MVI  - Ocean nasal spray    # Difficulty with access to peripheral veins-- Blood draws from Left arm Medline --RUE F/U venous duplex 12/11    #Precautions / PROPHYLAXIS:   - Falls  - ortho: Weight bearing status: WBAT   - Lungs: Aspiration, Incentive Spirometer   - DVT PPX: Eliquis 5 mg BID   ----------------------------------------  12/11- --Labs CBC mild anemia, normal renal function, LFT elevated, downtrending overall unremarkable   12/14--* Repeat lactate and other labs as noted     Health maintenance--await response from insurance company for the expedited appeal regarding acute rehab treatment   ----------------------------------------  Liaison with other providers/agencies    PEER TO PEER with Dr Tripp (patient's insurance company)  * Peer to Peer completed, insurance approval retrospectively given to cover from 11/14 to 11/20, Insurance co awaits updated notes to determine further approval of coverage   SW team to send updated clinical and functional notes to medical insurance company  ----------------------------------------  IDT conference on 12/5  Social Work: Awaiting insurance response on clinicals  SLP: -- n/a  OT: Setup for eating/grooming. min assistance for ADLs, mod assistance for toileting  Shower transfers mod assistance x 1  Independent with bed mobility  Ambulating via light gate 100--120ft   Amb 65 ft with bariatric RW and WCF with min A. 6 inch step mod A x2.   Contact guard for stand pivot transfers  Goal--min assist with ADLs  RT--Engaging for activity planning and relaxation exercises  DME: Bariatric RW, WC, drop arm commode  Barriers: New fevers  TDD: 12/20 to home.  ----------------------------------------  OUTPATIENT/FOLLOW UP:    Trae Whitney  Pulmonary Disease  [address] [place] [address], [address] [place] [address]  Phone: [phone]  Fax: [phone]  Follow Up Time: 2 weeks    Ryanne Allen  Rheumatology  [address] [address]  Phone: [phone]  Fax: [phone]  Follow Up Time: 1 week    Kyle Pierce  Internal Medicine  [address] [place] [address], [address]  [place] [address]  Phone: [phone]  Fax: [phone]  Follow Up Time: 2 weeks    Addy Powers  Pulmonary Disease  [address] [facility], [address]  [place], [place] [address]  Phone: [phone]  Fax: [phone]  Follow Up Time:     Kwame Hawley  Critical Care Medicine  [address] [facility], [address] [address]  Phone: [phone]  Fax: [phone]  Follow Up Time:     Neena Borrego  Rheumatology  [address] [place] [address], [address]  [place] [address]  Phone: [phone]  Fax: [phone]  Follow Up Time:    [facility]  [facility] [address]  Scheduled Appointment: 09/13/2023  2:40 PM      Non critical care  Time based billing  Spent 63 mins, patient review, discussion with patient and family, discussion regarding labs, and treatment plan, observation in therapy. and care co ordination  Assessment/Plan:  ALIZA FOLEY is a 62 year old female with PMH of pAFIB and sciatica; who presented to [facility] ED with SOB, and was found to have groundglass opacities and interstitial lung disease. She was treated with empiric Vancomycin and Zosyn. She underwent a bronchoscopy with bronchoalveolar lavage and pulse steroids. She was given IV diuretics for increased pulmonary congestion, but her respiratory status continued to worsen.  On 9/13, she was transferred to [facility] for ECMO requirements. She was further treated with Plasmapheresis, Rituximab and high-dose steroids, with significant improvement. Her overall hospital course was complicated by thrombocytopenia (s/p several platelet transfusions), leukocytosis (infectious workup entirely negative- s/p Vanco and Ceftriaxone), AFIB with RVR (resolved with Metoprolol), dysphagia (requiring NGT), transaminitis (secondary to acute illness and hypotension),  non-occlusive RIJ DVT (started on Lovenox). Patient now admitted for a multidisciplinary rehab program. 10-05-23 @ 13:18    * Sustained functional improvement--increasing distance with walker, after assistance with sit to stand  * Continue therapy on progressive ambulation and hip extensor strengthening  * Await outcome of expedited appeal to denial of acute rehab treatment  * Repeat lactate and other labs as noted, if elevated, will need IVF     #Interstitial Lung Disease and Mixed connective tissue disease  - Gait Instability, ADL impairments and Functional impairments: start Comprehensive Rehab Program of PT/OT/SLP - 3 hours a day, 5 days a week  - P&O as needed   - Non-specific Interstitial Lung Disease  - Requiring ECMO   - Improvement s/p Plasmapheresis, Rituximab and high- dose steroids   - Albuterol/Ipratropium Nebulizer every 6 hours  - Mepron 1500mg daily  - PO Medrol ( due to liver dysfunction): Plan will be to taper by ~20% every 2 weeks    Medrol   64mg x 2 weeks   56mg x 2 weeks  44mg x 2 weeks   36mg x 2 weeks - Follow up Outpatient   - Plan to wean 02 as tolerated, Continue tapering oxygen,  -  CT Chest noted 11/10---Resp f/u review  11/14, appreciated  They reports sustained improvement, clinical (normal oxy sats on R/A, sustained progress with oxy tapering), and radiological (no acute findings on recent CT Chest, rather residual chronic infiltrative diesease noted, with recommendation to repeat CT after Acute rehab treatment     #Fever  - Recent UTI on Bactrim (since DCd)  - UCX with enterococcus faecalis  - 101.4 fever overnight (12/4-12/5)  - Lactate of 2.5- patient refused IV fluids   - Start Zosyn IVPB 12/6 (pt agreeable)  - RVP negative  - F/U blood cultures, CXR  - ID consult and Pulm re-eval (no respiratory symptoms)  - Pulm recs: continue to monitor   - ID recs: CT chest- stable (12/5)   - Repeat Lactate of 3.1 - agreeable to IV fluids- s/p 1000mL bolus   - BL LE doppler- negative   --UTI--treated     #Cankre sore  - Lidocaine swish and spit TID x3 days     #Knee buckling  - 12/1: S/p guided fall with physical therapy  - Back muscle strain with catching self on RW   - Lidocaine patch to back and BL deltoids  - Ibuprofen 400mg x1 dose     #Experiencing R mandible and occipital numbness, with associated hair loss- outpatient follow up with Rheumatology    #Posttnasal drip--ENT review 11/7, declined scope, continue flonase     #pAFIB/Rt Ij thrombus  - Metoprolol 25mg BID and lovenox  ---Eliquis 5mg BID - tolerating well   -- RUE venous duplex check for resolution of know DVT 12/11    #Chronic Tinnitus   - ENT review and recs appreciate, Patient already made ENT appointment for f/u    #Lymphedema both legs -chronic and Rt IJ thrombus  - ACE Wrap BL LEs  - BL LE doppler negative for DVT  - BL UE doppler with chronic thrombotic changes in RIJ     #Transaminitis --LFT Down trending   - Secondary to acute illness and hypotension at [facility]  - Outpatient follow up     #Leucocytosis--steroid related, continue monitoring   # Elevated Lactate--f/u elevated lactate--f/u repeat level    #Neuropathy  - Gabapentin 300 mg BID, will consider increasing dose, increase to TID 11/10  --Work on motor strengthening, priority for UE as preferred by patient   - Vit b12 >2,000 in 9/2023  --Will consider Rhuematology f/u if needed    #Sleep/Mood  - Melatonin 6mg at HS  - Psych rec Xanax 0.25mg PRN    #Skin  - Skin: Left lower abdomen ruptured blister 3.0 x 1.0, stage 2 pressure injury to right buttock 4 x1 and left buttock 3x1, stage 2 pressure injury ti right inner thigh 0.2 x 0.2, right groin wound 10.5 x 2.0,   Wound care review, Left groin wound 11/15--- 0.6x1.8.0.1cm, no slough, healthy base  -- MAD to skin folds- Nystatin to submammary and abdominal pannus BID  -- MAD to L groin- cleanse with NS, Triad cream daily  -- Mad to R groin- Nystatin powder daily   -- R groin wound-- aquacel packing, Triad to periwound, Allevyn foam- daily and PRN    - Offload pressure, low air loss support surfaces, T&P every 2 hours   --Wound care consult-10/9 -Multiple wounds--perineal and buttock/sacral, recs appreciated   --Suggest Continue treatments as below:   Twice daily & PRN Normal Saline Cleanse of abdominal pannus & breast folds, perineal, Left & Right inner buttock erosions.  Pat thoroughly dry.   Apply Desitin generously twice daily (& PRN) to perineal, buttocks, and left groin erosions, leave open to air.    Apply Nystatin powder to abdominal pannus and breast folds.  Suggest use of Interdry cloths in folds to 'wick' moisture.  Suggest daily Normal Saline Cleanse of right groin surgical wound, pat dry.  Apply Cavillon film barrier to intact periwound skin.  Place Aquacell strip over open area, cover with foam dressing.  - Wound care following     #Pain Mgmt   - Tylenol PRN   - Gabapentin 300mg at HS     #GI/Bowel Mgmt/Hx of alternating bowel movements  - Pantoprazole  - PRN: Maalox   --Lactobacillus BID     #/Bladder Mgmt   -Spontaneously voiding   - UA (11/3) negative  - +UTI- Amoxicillin  completed  - 12/14---Elevated lactate--f/u repeat level and other labs     #FEN   #Dysphagia  - Diet - Minced & Moist  [CC]    - Dysphagia- SLP evaluation appreciated     #Health maintenance  - Folic Acid   - MVI  - Ocean nasal spray    # Difficulty with access to peripheral veins-- Blood draws from Left arm Medline --RUE F/U venous duplex 12/11    #Precautions / PROPHYLAXIS:   - Falls  - ortho: Weight bearing status: WBAT   - Lungs: Aspiration, Incentive Spirometer   - DVT PPX: Eliquis 5 mg BID   ----------------------------------------  12/11- --Labs CBC mild anemia, normal renal function, LFT elevated, downtrending overall unremarkable   12/14--* Repeat lactate and other labs as noted     Health maintenance--await response from insurance company for the expedited appeal regarding acute rehab treatment   ----------------------------------------  Liaison with other providers/agencies    PEER TO PEER with Dr Tripp (patient's insurance company)  * Peer to Peer completed, insurance approval retrospectively given to cover from 11/14 to 11/20, Insurance co awaits updated notes to determine further approval of coverage   SW team to send updated clinical and functional notes to medical insurance company  ----------------------------------------  IDT conference on 12/5  Social Work: Awaiting insurance response on clinicals  SLP: -- n/a  OT: Setup for eating/grooming. min assistance for ADLs, mod assistance for toileting  Shower transfers mod assistance x 1  Independent with bed mobility  Ambulating via light gate 100--120ft   Amb 65 ft with bariatric RW and WCF with min A. 6 inch step mod A x2.   Contact guard for stand pivot transfers  Goal--min assist with ADLs  RT--Engaging for activity planning and relaxation exercises  DME: Bariatric RW, WC, drop arm commode  Barriers: New fevers  TDD: 12/20 to home.  ----------------------------------------  OUTPATIENT/FOLLOW UP:    Trae Whitney  Pulmonary Disease  [address] [place] [address], [address] [place] [address]  Phone: [phone]  Fax: [phone]  Follow Up Time: 2 weeks    Ryanne Allen  Rheumatology  [address] [address]  Phone: [phone]  Fax: [phone]  Follow Up Time: 1 week    Kyle Pierce  Internal Medicine  [address] [place] [address], [address]  [place] [address]  Phone: [phone]  Fax: [phone]  Follow Up Time: 2 weeks    Addy Powers  Pulmonary Disease  [address] [facility], [address]  [place], [place] [address]  Phone: [phone]  Fax: [phone]  Follow Up Time:     Kwame Hawley  Critical Care Medicine  [address] [facility], [address] [address]  Phone: [phone]  Fax: [phone]  Follow Up Time:     Neena Borrego  Rheumatology  [address] [place] [address], [address]  [place] [address]  Phone: [phone]  Fax: [phone]  Follow Up Time:    [facility]  [facility] [address]  Scheduled Appointment: 09/13/2023  2:40 PM      Non critical care  Time based billing  Spent 63 mins, patient review, discussion with patient and family, discussion regarding labs, and treatment plan, observation in therapy. and care co ordination

## 2023-12-14 NOTE — PROGRESS NOTE ADULT - ASSESSMENT
61 y/o F with chronic AFib, hx sciatica, newly diagnosed ILD (likely associated with MCTD +ARIANA, +RNP, +Sm) with exacerbation requiring ECMO / plasmapharesis / Rituximab / steriods, now at acute rehab.    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 Recommend to transition to 28mg medrol starting 12/15  -c/w nebs  -Mepron for PCP ppx while on steroids   -Check ambulatory O2 sat periodically  -Continue comprehensive rehab program - PT/OT/SLP per rehab team  -Pain management, bowel regimen per rehab       #Elevated lactate levels  -at this time no signs or symptoms of infectious etiology  -possibly elevated due to dehydration. Patient does not want IVF but will increase PO hydration  -also potential contributers are steroid use/ILD  -given clinical stability, will monitor clinical signs of infection and trend lactate.     #Chronic macrocytic anemia  -H/H remains stable  -Continue MVI, folate and thiamine    #PAF  #Non-occlusive right IJ thrombus   -c/w Eliquis  -c/w lopressor 12.5mg po BID  -goal HR for pAfib is <110    #Anxiety  -appreciate psych follow-up  -c/w Xanax PRN    #?JACEY  #nocturnal desat  -discussed about CPAP use. oxygen use during sleep and PRN. patient may need OP sleep study. patient is aware. she will speak to her pulm. informed RN to check ambulatory O2 sat during therapy and at rest.     #Severe protein-calorie malnutrition  -Nutrition following    #DVT ppx: Eliquis

## 2023-12-14 NOTE — PROGRESS NOTE ADULT - ASSESSMENT
62 year old female with PMH of pAFIB and sciatica, new dx of MCTD related ILD found in setting of acute hypoxic respiratory failure + DAH. Now in acute rehab, tapering steroids well. 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 Unclear etiology, doesn't appear to have large nerve involvement given mild, stable sx, might be 2/2 ECMO positioning. Suspect hair loss is also 2/2 pressure, age related loss and possible telogen effluvium 2/2 recent critical illness. No evidence of active MCTD sx and tapering well on steroids.     Prior rheum w/u and course summary --   - Serology: ARIANA 1:1280 Speckled, dsDNA <12. + SM, + Anti-RNP, U1-snRNP RNP A 162, U1-snRNP, + f9lyQPC, RNP-70kd 111  - Skin rash on chest with periungual ulcers, ILD, leukopenia, thrombocytopenia.   - CT chest (8/28): Since August 26, 2023, again interstitial lung disease non-UIP pattern. Although no subpleural sparing, possibly interstitial pneumonia, differential includes NSIP associated with connective tissue disorders, chronic HP or drug toxicity.  - Bronchoscopy results (08/30): Bronchial tissue and interstitium showing organizing pneumonia and focal fibrin  - CT chest (9/11): Improved/decreased extent of bilateral groundglass opacities and reticular markings compared to the previous study. Findings are nonspecific but differential etiologies include interstitial pneumonia, drug toxicity, nonspecific connective tissue disorders. New small focus of parenchymal consolidation seen in the lingula;   possible small focal pneumonia.  - Repeat Bronchoscopy (9/13): BAL performed of lingula. Serial BAL x3 performed of RML with progressively bloody return.  - Lab showed thrombocytopenia since she developed respiratory failure ( normal on the first admission to [facility])   - S/p Rituximab 1st dose 9/16/23,  2nd dose on 10/3  - s/p plasmapheresis (First session (9/15), (9/18), (9/20)  - 9/20/2023: lungs are improving clinically and radiographically. Repeat bronch without signs of DAH  - 9/22: Pt extubated and d/c ECMO    Plan --   - Taper to Medrol 28mg x 2 weeks then 20mg x 2 weeks, then can taper by 4mg qweekly - 16mg, 12mg, 8mg. Remain on 4mg/day until next clinic visit   - continue with PCP ppx with Mepron and GI ppx with PPI while on steroids   - no further Rituxan needed at present time  - pt will be going to [place] shortly following hospital discharge - will have labs done prior to leaving, paper lab orders provided to her today, will arrange rheum f/u with Dr Escobar once back from [place] in late March so can facilitate next Rituxan dosing.   - please provide 1 month of medrol on discharge, my office will continue rx until she is seen by Dr Escobar. Pt has also been advised to find a rheumatologist where she will be in case of intermediate emergencies     I will be away next week, however I will be available via cell in case urgently needed - [phone]   62 year old female with PMH of pAFIB and sciatica, new dx of MCTD related ILD found in setting of acute hypoxic respiratory failure + DAH. Now in acute rehab, tapering steroids well. 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 Unclear etiology, doesn't appear to have large nerve involvement given mild, stable sx, might be 2/2 ECMO positioning. Suspect hair loss is also 2/2 pressure, age related loss and possible telogen effluvium 2/2 recent critical illness. No evidence of active MCTD sx and tapering well on steroids.     Prior rheum w/u and course summary --   - Serology: ARIANA 1:1280 Speckled, dsDNA <12. + SM, + Anti-RNP, U1-snRNP RNP A 162, U1-snRNP, + b9pjGRO, RNP-70kd 111  - Skin rash on chest with periungual ulcers, ILD, leukopenia, thrombocytopenia.   - CT chest (8/28): Since August 26, 2023, again interstitial lung disease non-UIP pattern. Although no subpleural sparing, possibly interstitial pneumonia, differential includes NSIP associated with connective tissue disorders, chronic HP or drug toxicity.  - Bronchoscopy results (08/30): Bronchial tissue and interstitium showing organizing pneumonia and focal fibrin  - CT chest (9/11): Improved/decreased extent of bilateral groundglass opacities and reticular markings compared to the previous study. Findings are nonspecific but differential etiologies include interstitial pneumonia, drug toxicity, nonspecific connective tissue disorders. New small focus of parenchymal consolidation seen in the lingula;   possible small focal pneumonia.  - Repeat Bronchoscopy (9/13): BAL performed of lingula. Serial BAL x3 performed of RML with progressively bloody return.  - Lab showed thrombocytopenia since she developed respiratory failure ( normal on the first admission to [facility])   - S/p Rituximab 1st dose 9/16/23,  2nd dose on 10/3  - s/p plasmapheresis (First session (9/15), (9/18), (9/20)  - 9/20/2023: lungs are improving clinically and radiographically. Repeat bronch without signs of DAH  - 9/22: Pt extubated and d/c ECMO    Plan --   - Taper to Medrol 28mg x 2 weeks then 20mg x 2 weeks, then can taper by 4mg qweekly - 16mg, 12mg, 8mg. Remain on 4mg/day until next clinic visit   - continue with PCP ppx with Mepron and GI ppx with PPI while on steroids   - no further Rituxan needed at present time  - pt will be going to [place] shortly following hospital discharge - will have labs done prior to leaving, paper lab orders provided to her today, will arrange rheum f/u with Dr Esocbar once back from [place] in late March so can facilitate next Rituxan dosing.   - please provide 1 month of medrol on discharge, my office will continue rx until she is seen by Dr Escobar. Pt has also been advised to find a rheumatologist where she will be in case of intermediate emergencies     I will be away next week, however I will be available via cell in case urgently needed - [phone]

## 2023-12-14 NOTE — PROGRESS NOTE ADULT - SUBJECTIVE AND OBJECTIVE BOX
CC: f/u for uti    Patient reports  feels fine  REVIEW OF SYSTEMS:  All other review of systems negative (Comprehensive ROS)    Antimicrobials Day #  :  atovaquone  Suspension 1500 milliGRAM(s) Oral daily    Other Medications Reviewed  vss per flow  PHYSICAL EXAM:  General: alert, no acute distress  Eyes:  anicteric, no conjunctival injection, no discharge  Oropharynx: no lesions or injection 	  Neck: supple, without adenopathy  Lungs: rales to auscultation  Heart: regular rate and rhythm; no murmur, rubs or gallops  Abdomen: soft, nondistended, nontender, without mass or organomegaly  Skin: no lesions  Extremities: no clubbing, cyanosis, or edema  Neurologic: alert, oriented, moves all extremities    LAB RESULTS:                        10.5   12.77 )-----------( 367      ( 14 Dec 2023 11:46 )             33.5     12-14    140  |  105  |  22  ----------------------------<  157<H>  3.8   |  27  |  0.80    Ca    9.3      14 Dec 2023 11:46    TPro  5.5<L>  /  Alb  2.8<L>  /  TBili  0.7  /  DBili  x   /  AST  24  /  ALT  48<H>  /  AlkPhos  114  12-14    LIVER FUNCTIONS - ( 14 Dec 2023 11:46 )  Alb: 2.8 g/dL / Pro: 5.5 g/dL / ALK PHOS: 114 U/L / ALT: 48 U/L / AST: 24 U/L / GGT: x           Urinalysis Basic - ( 14 Dec 2023 11:46 )    Color: x / Appearance: x / SG: x / pH: x  Gluc: 157 mg/dL / Ketone: x  / Bili: x / Urobili: x   Blood: x / Protein: x / Nitrite: x   Leuk Esterase: x / RBC: x / WBC x   Sq Epi: x / Non Sq Epi: x / Bacteria: x      MICROBIOLOGY:  RECENT CULTURES:      RADIOLOGY REVIEWED:  < from: US Duplex Venous Upper Ext Ltd, Right (12.11.23 @ 11:20) >    ACC: [id number] EXAM:  US DPLX UPR EXT VEINS LTD RT   ORDERED BY:   MONAE FLYNN     PROCEDURE DATE:  12/11/2023          INTERPRETATION:  CLINICAL INFORMATION: Rule out deep vein thrombosis.    COMPARISON: Venous Doppler dated 12/06/2023.    TECHNIQUE: Duplex sonography of the RIGHT UPPER extremity veins with   color and spectral Doppler, with and without compression.    FINDINGS:    The right internal jugular, subclavian, axillary and brachial veins are   patent and compressible where applicable.  The basilic vein (superficial   vein) is patent and without thrombus.  The cephalic vein (superficial   vein) is patent and without thrombus.    Doppler examination shows normal spontaneous and phasic flow.    IMPRESSION:  No evidence of right upper extremity deep venous thrombosis.        < end of copied text >              Assessment:  Patient here at rehab for 2 months after a prolonged hospital stay for respiratory failure, dx with mct /ild, had ecmo, rituximab, plasmapharesis, remains on steroids so on atovaquone prophylaxis. She had recent fever, had zosyn then changed to po levaquin for uti. She grew enterococcus form the urine, not bacteremic.. She has now finished a course of amoxicillin for enterococcus  pyelo, she is feeling well. She is on steroids so continues on atovaquone for pcp prophylaxis. Her wbc went up despite no increase in steroids but clinically she looks fine so will monitor   Plan:  continue atovaquone with steroids  monitor clinically and wbc     Plan: CC: f/u for uti    Patient reports  feels fine  REVIEW OF SYSTEMS:  All other review of systems negative (Comprehensive ROS)    Antimicrobials Day #  :  atovaquone  Suspension 1500 milliGRAM(s) Oral daily    Other Medications Reviewed  vss per flow  PHYSICAL EXAM:  General: alert, no acute distress  Eyes:  anicteric, no conjunctival injection, no discharge  Oropharynx: no lesions or injection 	  Neck: supple, without adenopathy  Lungs: rales to auscultation  Heart: regular rate and rhythm; no murmur, rubs or gallops  Abdomen: soft, nondistended, nontender, without mass or organomegaly  Skin: no lesions  Extremities: no clubbing, cyanosis, or edema  Neurologic: alert, oriented, moves all extremities    LAB RESULTS:                        10.5   12.77 )-----------( 367      ( 14 Dec 2023 11:46 )             33.5     12-14    140  |  105  |  22  ----------------------------<  157<H>  3.8   |  27  |  0.80    Ca    9.3      14 Dec 2023 11:46    TPro  5.5<L>  /  Alb  2.8<L>  /  TBili  0.7  /  DBili  x   /  AST  24  /  ALT  48<H>  /  AlkPhos  114  12-14    LIVER FUNCTIONS - ( 14 Dec 2023 11:46 )  Alb: 2.8 g/dL / Pro: 5.5 g/dL / ALK PHOS: 114 U/L / ALT: 48 U/L / AST: 24 U/L / GGT: x           Urinalysis Basic - ( 14 Dec 2023 11:46 )    Color: x / Appearance: x / SG: x / pH: x  Gluc: 157 mg/dL / Ketone: x  / Bili: x / Urobili: x   Blood: x / Protein: x / Nitrite: x   Leuk Esterase: x / RBC: x / WBC x   Sq Epi: x / Non Sq Epi: x / Bacteria: x      MICROBIOLOGY:  RECENT CULTURES:      RADIOLOGY REVIEWED:  < from: US Duplex Venous Upper Ext Ltd, Right (12.11.23 @ 11:20) >    ACC: [id number] EXAM:  US DPLX UPR EXT VEINS LTD RT   ORDERED BY:   MONAE FLYNN     PROCEDURE DATE:  12/11/2023          INTERPRETATION:  CLINICAL INFORMATION: Rule out deep vein thrombosis.    COMPARISON: Venous Doppler dated 12/06/2023.    TECHNIQUE: Duplex sonography of the RIGHT UPPER extremity veins with   color and spectral Doppler, with and without compression.    FINDINGS:    The right internal jugular, subclavian, axillary and brachial veins are   patent and compressible where applicable.  The basilic vein (superficial   vein) is patent and without thrombus.  The cephalic vein (superficial   vein) is patent and without thrombus.    Doppler examination shows normal spontaneous and phasic flow.    IMPRESSION:  No evidence of right upper extremity deep venous thrombosis.        < end of copied text >              Assessment:  Patient here at rehab for 2 months after a prolonged hospital stay for respiratory failure, dx with mct /ild, had ecmo, rituximab, plasmapharesis, remains on steroids so on atovaquone prophylaxis. She had recent fever, had zosyn then changed to po levaquin for uti. She grew enterococcus form the urine, not bacteremic.. She has now finished a course of amoxicillin for enterococcus  pyelo, she is feeling well. She is on steroids so continues on atovaquone for pcp prophylaxis. Her wbc went up despite no increase in steroids but clinically she looks fine so will monitor   Plan:  continue atovaquone with steroids  monitor clinically and wbc     Plan:

## 2023-12-14 NOTE — PROGRESS NOTE ADULT - SUBJECTIVE AND OBJECTIVE BOX
ALIZA FOLEY  [id number]    INTERVAL HPI/OVERNIGHT EVENTS: Gradually improving strength, no worsening of her focal numbness, needing far less supplemental O2. CTD ROS otherwise negative.     MEDICATIONS  (STANDING):  apixaban 5 milliGRAM(s) Oral every 12 hours  artificial  tears Solution 1 Drop(s) Both EYES every 12 hours  ascorbic acid 500 milliGRAM(s) Oral daily  atovaquone  Suspension 1500 milliGRAM(s) Oral daily  dextrose 5%. 1000 milliLiter(s) (50 mL/Hr) IV Continuous <Continuous>  dextrose 5%. 1000 milliLiter(s) (100 mL/Hr) IV Continuous <Continuous>  dextrose 50% Injectable 25 Gram(s) IV Push once  dextrose 50% Injectable 25 Gram(s) IV Push once  dextrose 50% Injectable 12.5 Gram(s) IV Push once  folic acid 1 milliGRAM(s) Oral daily  gabapentin 300 milliGRAM(s) Oral three times a day  glucagon  Injectable 1 milliGRAM(s) IntraMuscular once  lactobacillus acidophilus 1 Tablet(s) Oral two times a day with meals  lidocaine   4% Patch 1 Patch Transdermal <User Schedule>  lidocaine   4% Patch 2 Patch Transdermal <User Schedule>  melatonin 6 milliGRAM(s) Oral at bedtime  metoprolol tartrate 12.5 milliGRAM(s) Oral two times a day  multivitamin 1 Tablet(s) Oral daily  nystatin Powder 1 Application(s) Topical two times a day  pantoprazole    Tablet 40 milliGRAM(s) Oral before breakfast  polyethylene glycol 3350 17 Gram(s) Oral <User Schedule>  sodium chloride 0.9% Bolus 1000 milliLiter(s) IV Bolus once  sodium chloride 0.9% lock flush 5 milliLiter(s) IV Push two times a day  sodium chloride 0.9%. 1000 milliLiter(s) (100 mL/Hr) IV Continuous <Continuous>  zinc oxide 40% Paste 1 Application(s) Topical three times a day    MEDICATIONS  (PRN):  acetaminophen     Tablet .. 650 milliGRAM(s) Oral every 6 hours PRN Temp greater or equal to 38C (100.4F), Mild Pain (1 - 3), Moderate Pain (4 - 6)  albuterol/ipratropium for Nebulization 3 milliLiter(s) Nebulizer every 6 hours PRN Shortness of Breath and/or Wheezing  ALPRAZolam 0.25 milliGRAM(s) Oral every 6 hours PRN Anxiety  aluminum hydroxide/magnesium hydroxide/simethicone Suspension 30 milliLiter(s) Oral every 4 hours PRN Dyspepsia  ammonium lactate 12% Lotion 1 Application(s) Topical every 12 hours PRN BL feet dryness  baclofen 5 milliGRAM(s) Oral every 8 hours PRN Musculoskeletal Pain  benzocaine/menthol Lozenge 1 Lozenge Oral two times a day PRN Sore Throat  benzonatate 100 milliGRAM(s) Oral three times a day PRN Cough  bisacodyl 5 milliGRAM(s) Oral daily PRN Constipation  dextrose Oral Gel 15 Gram(s) Oral once PRN Blood Glucose LESS THAN 70 milliGRAM(s)/deciliter  hydrocortisone hemorrhoidal Suppository 1 Suppository(s) Rectal two times a day PRN Hemorrhoids  loperamide 2 milliGRAM(s) Oral three times a day PRN Diarrhea  SIMETHICONE SOFTGELS 250 milliGRAM(s),SIMETHICONE SOFTGELS 250 milliGRAM(s) 250 milliGRAM(s) Oral three times a day PRN for gas  sodium chloride 0.65% Nasal 1 Spray(s) Both Nostrils every 8 hours PRN Nasal Congestion      Allergies    No Known Allergies    Intolerances    Review of Systems:   General: No fevers/chills, no fatigue  HEENT: No blurry vision, dysphagia, or odynophagia   CVS: No CP/palpitations  Resp: No SOB/wheezing  GI: No N/V/C/D/abdominal pain  MSK: No synovitis   Skin: No new rashes  Neuro: No headaches    Vital Signs Last 24 Hrs  T(C): 36.8 (14 Dec 2023 20:32), Max: 36.9 (14 Dec 2023 08:29)  T(F): 98.3 (14 Dec 2023 20:32), Max: 98.4 (14 Dec 2023 08:29)  HR: 87 (14 Dec 2023 20:32) (84 - 87)  BP: 110/70 (14 Dec 2023 20:32) (110/70 - 114/73)  RR: 16 (14 Dec 2023 20:32) (16 - 16)  SpO2: 93% (14 Dec 2023 20:32) (92% - 93%)    Parameters below as of 14 Dec 2023 20:32  Patient On (Oxygen Delivery Method): room air    Physical Exam:  General: No apparent distress, AAOx3   HEENT: EOMI, MMM, no oral ulcers   CVS: +S1/S2, RRR  Resp: CTA b/l anteriorly   GI: Soft, NT/ND  MSK: No synovitis, effusions, contractures   Neuro: decreased sensation to light touch over occipital area of scalp and subcutaneous tissue under chin but no focal motor deficits on face   Skin: no focal alopecia, diffuse age related hair thinning, no inflammatory rashes     LABS:                        10.5   12.77 )-----------( 367      ( 14 Dec 2023 11:46 )             33.5     12-14    140  |  105  |  22  ----------------------------<  157<H>  3.8   |  27  |  0.80    Ca    9.3      14 Dec 2023 11:46    TPro  5.5<L>  /  Alb  2.8<L>  /  TBili  0.7  /  DBili  x   /  AST  24  /  ALT  48<H>  /  AlkPhos  114  12-14      Urinalysis Basic - ( 14 Dec 2023 11:46 )    Color: x / Appearance: x / SG: x / pH: x  Gluc: 157 mg/dL / Ketone: x  / Bili: x / Urobili: x   Blood: x / Protein: x / Nitrite: x   Leuk Esterase: x / RBC: x / WBC x   Sq Epi: x / Non Sq Epi: x / Bacteria: x    RADIOLOGY & ADDITIONAL TESTS:  < from: CT Chest No Cont (12.05.23 @ 14:56) >    IMPRESSION:  There are stable peripherally distributed reticular   opacities identified within the bilateral lung parenchyma with comparison   to examination November 10, 2023, without evidence for interval   development of infiltrate or lobar consolidation. No significant traction   bronchiectasis or honeycombing identified. Band type opacity within the   right lower lobe likely related to platelike atelectasis/scarring.            --- End of Report ---      SEAMUS HERNANDEZ MD; Attending Radiologist  This document has been electronically signed. Dec  5 2023  4:42PM    < end of copied text >     ALIZA FOLEY  [id number]    INTERVAL HPI/OVERNIGHT EVENTS: Gradually improving strength, no worsening of her focal numbness, needing far less supplemental O2. CTD ROS otherwise negative.     MEDICATIONS  (STANDING):  apixaban 5 milliGRAM(s) Oral every 12 hours  artificial  tears Solution 1 Drop(s) Both EYES every 12 hours  ascorbic acid 500 milliGRAM(s) Oral daily  atovaquone  Suspension 1500 milliGRAM(s) Oral daily  dextrose 5%. 1000 milliLiter(s) (50 mL/Hr) IV Continuous <Continuous>  dextrose 5%. 1000 milliLiter(s) (100 mL/Hr) IV Continuous <Continuous>  dextrose 50% Injectable 25 Gram(s) IV Push once  dextrose 50% Injectable 25 Gram(s) IV Push once  dextrose 50% Injectable 12.5 Gram(s) IV Push once  folic acid 1 milliGRAM(s) Oral daily  gabapentin 300 milliGRAM(s) Oral three times a day  glucagon  Injectable 1 milliGRAM(s) IntraMuscular once  lactobacillus acidophilus 1 Tablet(s) Oral two times a day with meals  lidocaine   4% Patch 1 Patch Transdermal <User Schedule>  lidocaine   4% Patch 2 Patch Transdermal <User Schedule>  melatonin 6 milliGRAM(s) Oral at bedtime  metoprolol tartrate 12.5 milliGRAM(s) Oral two times a day  multivitamin 1 Tablet(s) Oral daily  nystatin Powder 1 Application(s) Topical two times a day  pantoprazole    Tablet 40 milliGRAM(s) Oral before breakfast  polyethylene glycol 3350 17 Gram(s) Oral <User Schedule>  sodium chloride 0.9% Bolus 1000 milliLiter(s) IV Bolus once  sodium chloride 0.9% lock flush 5 milliLiter(s) IV Push two times a day  sodium chloride 0.9%. 1000 milliLiter(s) (100 mL/Hr) IV Continuous <Continuous>  zinc oxide 40% Paste 1 Application(s) Topical three times a day    MEDICATIONS  (PRN):  acetaminophen     Tablet .. 650 milliGRAM(s) Oral every 6 hours PRN Temp greater or equal to 38C (100.4F), Mild Pain (1 - 3), Moderate Pain (4 - 6)  albuterol/ipratropium for Nebulization 3 milliLiter(s) Nebulizer every 6 hours PRN Shortness of Breath and/or Wheezing  ALPRAZolam 0.25 milliGRAM(s) Oral every 6 hours PRN Anxiety  aluminum hydroxide/magnesium hydroxide/simethicone Suspension 30 milliLiter(s) Oral every 4 hours PRN Dyspepsia  ammonium lactate 12% Lotion 1 Application(s) Topical every 12 hours PRN BL feet dryness  baclofen 5 milliGRAM(s) Oral every 8 hours PRN Musculoskeletal Pain  benzocaine/menthol Lozenge 1 Lozenge Oral two times a day PRN Sore Throat  benzonatate 100 milliGRAM(s) Oral three times a day PRN Cough  bisacodyl 5 milliGRAM(s) Oral daily PRN Constipation  dextrose Oral Gel 15 Gram(s) Oral once PRN Blood Glucose LESS THAN 70 milliGRAM(s)/deciliter  hydrocortisone hemorrhoidal Suppository 1 Suppository(s) Rectal two times a day PRN Hemorrhoids  loperamide 2 milliGRAM(s) Oral three times a day PRN Diarrhea  SIMETHICONE SOFTGELS 250 milliGRAM(s),SIMETHICONE SOFTGELS 250 milliGRAM(s) 250 milliGRAM(s) Oral three times a day PRN for gas  sodium chloride 0.65% Nasal 1 Spray(s) Both Nostrils every 8 hours PRN Nasal Congestion      Allergies    No Known Allergies    Intolerances    Review of Systems:   General: No fevers/chills, no fatigue  HEENT: No blurry vision, dysphagia, or odynophagia   CVS: No CP/palpitations  Resp: No SOB/wheezing  GI: No N/V/C/D/abdominal pain  MSK: No synovitis   Skin: No new rashes  Neuro: No headaches    Vital Signs Last 24 Hrs  T(C): 36.8 (14 Dec 2023 20:32), Max: 36.9 (14 Dec 2023 08:29)  T(F): 98.3 (14 Dec 2023 20:32), Max: 98.4 (14 Dec 2023 08:29)  HR: 87 (14 Dec 2023 20:32) (84 - 87)  BP: 110/70 (14 Dec 2023 20:32) (110/70 - 114/73)  RR: 16 (14 Dec 2023 20:32) (16 - 16)  SpO2: 93% (14 Dec 2023 20:32) (92% - 93%)    Parameters below as of 14 Dec 2023 20:32  Patient On (Oxygen Delivery Method): room air    Physical Exam:  General: No apparent distress, AAOx3   HEENT: EOMI, MMM, no oral ulcers   CVS: +S1/S2, RRR  Resp: CTA b/l anteriorly   GI: Soft, NT/ND  MSK: No synovitis, effusions, contractures   Neuro: decreased sensation to light touch over occipital area of scalp and subcutaneous tissue under chin but no focal motor deficits on face   Skin: no focal alopecia, diffuse age related hair thinning, no inflammatory rashes     LABS:                        10.5   12.77 )-----------( 367      ( 14 Dec 2023 11:46 )             33.5     12-14    140  |  105  |  22  ----------------------------<  157<H>  3.8   |  27  |  0.80    Ca    9.3      14 Dec 2023 11:46    TPro  5.5<L>  /  Alb  2.8<L>  /  TBili  0.7  /  DBili  x   /  AST  24  /  ALT  48<H>  /  AlkPhos  114  12-14      Urinalysis Basic - ( 14 Dec 2023 11:46 )    Color: x / Appearance: x / SG: x / pH: x  Gluc: 157 mg/dL / Ketone: x  / Bili: x / Urobili: x   Blood: x / Protein: x / Nitrite: x   Leuk Esterase: x / RBC: x / WBC x   Sq Epi: x / Non Sq Epi: x / Bacteria: x    RADIOLOGY & ADDITIONAL TESTS:  < from: CT Chest No Cont (12.05.23 @ 14:56) >    IMPRESSION:  There are stable peripherally distributed reticular   opacities identified within the bilateral lung parenchyma with comparison   to examination November 10, 2023, without evidence for interval   development of infiltrate or lobar consolidation. No significant traction   bronchiectasis or honeycombing identified. Band type opacity within the   right lower lobe likely related to platelike atelectasis/scarring.            --- End of Report ---      SEAMUS HERNANDEZ MD; Attending Radiologist  This document has been electronically signed. Dec  5 2023  4:42PM    < end of copied text >

## 2023-12-15 LAB
APPEARANCE UR: CLEAR — SIGNIFICANT CHANGE UP
APPEARANCE UR: CLEAR — SIGNIFICANT CHANGE UP
BACTERIA # UR AUTO: ABNORMAL /HPF
BACTERIA # UR AUTO: ABNORMAL /HPF
BILIRUB UR-MCNC: NEGATIVE — SIGNIFICANT CHANGE UP
BILIRUB UR-MCNC: NEGATIVE — SIGNIFICANT CHANGE UP
COD CRY URNS QL: PRESENT
COD CRY URNS QL: PRESENT
COLOR SPEC: YELLOW — SIGNIFICANT CHANGE UP
COLOR SPEC: YELLOW — SIGNIFICANT CHANGE UP
COMMENT - URINE: SIGNIFICANT CHANGE UP
COMMENT - URINE: SIGNIFICANT CHANGE UP
DIFF PNL FLD: NEGATIVE — SIGNIFICANT CHANGE UP
DIFF PNL FLD: NEGATIVE — SIGNIFICANT CHANGE UP
EPI CELLS # UR: 2 — SIGNIFICANT CHANGE UP
EPI CELLS # UR: 2 — SIGNIFICANT CHANGE UP
GLUCOSE UR QL: NEGATIVE MG/DL — SIGNIFICANT CHANGE UP
GLUCOSE UR QL: NEGATIVE MG/DL — SIGNIFICANT CHANGE UP
KETONES UR-MCNC: NEGATIVE MG/DL — SIGNIFICANT CHANGE UP
KETONES UR-MCNC: NEGATIVE MG/DL — SIGNIFICANT CHANGE UP
LEUKOCYTE ESTERASE UR-ACNC: NEGATIVE — SIGNIFICANT CHANGE UP
LEUKOCYTE ESTERASE UR-ACNC: NEGATIVE — SIGNIFICANT CHANGE UP
NITRITE UR-MCNC: NEGATIVE — SIGNIFICANT CHANGE UP
NITRITE UR-MCNC: NEGATIVE — SIGNIFICANT CHANGE UP
PH UR: 5.5 — SIGNIFICANT CHANGE UP (ref 5–8)
PH UR: 5.5 — SIGNIFICANT CHANGE UP (ref 5–8)
PROT UR-MCNC: NEGATIVE MG/DL — SIGNIFICANT CHANGE UP
PROT UR-MCNC: NEGATIVE MG/DL — SIGNIFICANT CHANGE UP
RBC CASTS # UR COMP ASSIST: 1 /HPF — SIGNIFICANT CHANGE UP (ref 0–4)
RBC CASTS # UR COMP ASSIST: 1 /HPF — SIGNIFICANT CHANGE UP (ref 0–4)
SP GR SPEC: 1.03 — SIGNIFICANT CHANGE UP (ref 1–1.03)
SP GR SPEC: 1.03 — SIGNIFICANT CHANGE UP (ref 1–1.03)
UROBILINOGEN FLD QL: 0.2 MG/DL — SIGNIFICANT CHANGE UP (ref 0.2–1)
UROBILINOGEN FLD QL: 0.2 MG/DL — SIGNIFICANT CHANGE UP (ref 0.2–1)
WBC UR QL: 2 /HPF — SIGNIFICANT CHANGE UP (ref 0–5)
WBC UR QL: 2 /HPF — SIGNIFICANT CHANGE UP (ref 0–5)

## 2023-12-15 PROCEDURE — 99232 SBSQ HOSP IP/OBS MODERATE 35: CPT

## 2023-12-15 RX ADMIN — ZINC OXIDE 1 APPLICATION(S): 200 OINTMENT TOPICAL at 09:00

## 2023-12-15 RX ADMIN — Medication 1 TABLET(S): at 21:27

## 2023-12-15 RX ADMIN — Medication 28 MILLIGRAM(S): at 09:26

## 2023-12-15 RX ADMIN — APIXABAN 5 MILLIGRAM(S): 2.5 TABLET, FILM COATED ORAL at 09:03

## 2023-12-15 RX ADMIN — GABAPENTIN 300 MILLIGRAM(S): 400 CAPSULE ORAL at 21:28

## 2023-12-15 RX ADMIN — Medication 1 TABLET(S): at 09:03

## 2023-12-15 RX ADMIN — NYSTATIN CREAM 1 APPLICATION(S): 100000 CREAM TOPICAL at 08:59

## 2023-12-15 RX ADMIN — ATOVAQUONE 1500 MILLIGRAM(S): 750 SUSPENSION ORAL at 21:29

## 2023-12-15 RX ADMIN — NYSTATIN CREAM 1 APPLICATION(S): 100000 CREAM TOPICAL at 21:39

## 2023-12-15 RX ADMIN — GABAPENTIN 300 MILLIGRAM(S): 400 CAPSULE ORAL at 14:46

## 2023-12-15 RX ADMIN — Medication 1 TABLET(S): at 21:28

## 2023-12-15 RX ADMIN — Medication 500 MILLIGRAM(S): at 21:29

## 2023-12-15 RX ADMIN — PANTOPRAZOLE SODIUM 40 MILLIGRAM(S): 20 TABLET, DELAYED RELEASE ORAL at 09:03

## 2023-12-15 RX ADMIN — ZINC OXIDE 1 APPLICATION(S): 200 OINTMENT TOPICAL at 14:42

## 2023-12-15 RX ADMIN — Medication 1 DROP(S): at 08:59

## 2023-12-15 RX ADMIN — ZINC OXIDE 1 APPLICATION(S): 200 OINTMENT TOPICAL at 21:39

## 2023-12-15 RX ADMIN — APIXABAN 5 MILLIGRAM(S): 2.5 TABLET, FILM COATED ORAL at 21:29

## 2023-12-15 RX ADMIN — Medication 12.5 MILLIGRAM(S): at 21:26

## 2023-12-15 RX ADMIN — GABAPENTIN 300 MILLIGRAM(S): 400 CAPSULE ORAL at 09:02

## 2023-12-15 RX ADMIN — Medication 1 MILLIGRAM(S): at 21:29

## 2023-12-15 NOTE — PROGRESS NOTE ADULT - SUBJECTIVE AND OBJECTIVE BOX
CC: f/u for  uti  Patient reports  she is concerned she has a uti but has no dysuria, no flank pain, no fever, no bladder pain  REVIEW OF SYSTEMS:  All other review of systems negative (Comprehensive ROS)    Antimicrobials Day #  :  atovaquone  Suspension 1500 milliGRAM(s) Oral daily    Other Medications Reviewed    T(F): 98.3 (12-15-23 @ 09:11), Max: 98.3 (12-14-23 @ 20:32)  HR: 83 (12-15-23 @ 09:11)  BP: 100/60 (12-15-23 @ 09:11)  RR: 16 (12-15-23 @ 09:11)  SpO2: 93% (12-14-23 @ 20:32)  Wt(kg): --    PHYSICAL EXAM:  General: alert, no acute distress  Eyes:  anicteric, no conjunctival injection, no discharge  Oropharynx: no lesions or injection 	  Neck: supple, without adenopathy  Lungs: clear to auscultation  Heart: regular rate and rhythm; no murmur, rubs or gallops  Abdomen: soft, nondistended, nontender, without mass or organomegaly  Skin: no lesions  Extremities: no clubbing, cyanosis, or edema  Neurologic: alert, oriented, moves all extremities    LAB RESULTS:                        10.5   12.77 )-----------( 367      ( 14 Dec 2023 11:46 )             33.5     12-14    140  |  105  |  22  ----------------------------<  157<H>  3.8   |  27  |  0.80    Ca    9.3      14 Dec 2023 11:46    TPro  5.5<L>  /  Alb  2.8<L>  /  TBili  0.7  /  DBili  x   /  AST  24  /  ALT  48<H>  /  AlkPhos  114  12-14    LIVER FUNCTIONS - ( 14 Dec 2023 11:46 )  Alb: 2.8 g/dL / Pro: 5.5 g/dL / ALK PHOS: 114 U/L / ALT: 48 U/L / AST: 24 U/L / GGT: x           Urinalysis Basic - ( 14 Dec 2023 11:46 )    Color: x / Appearance: x / SG: x / pH: x  Gluc: 157 mg/dL / Ketone: x  / Bili: x / Urobili: x   Blood: x / Protein: x / Nitrite: x   Leuk Esterase: x / RBC: x / WBC x   Sq Epi: x / Non Sq Epi: x / Bacteria: x      MICROBIOLOGY:  RECENT CULTURES:      RADIOLOGY REVIEWED:  < from: US Duplex Venous Upper Ext Ltd, Right (12.11.23 @ 11:20) >  ACC: [id number] EXAM:  US DPLX UPR EXT VEINS LTD RT   ORDERED BY:   MONAE FLYNN     PROCEDURE DATE:  12/11/2023          INTERPRETATION:  CLINICAL INFORMATION: Rule out deep vein thrombosis.    COMPARISON: Venous Doppler dated 12/06/2023.    TECHNIQUE: Duplex sonography of the RIGHT UPPER extremity veins with   color and spectral Doppler, with and without compression.    FINDINGS:    The right internal jugular, subclavian, axillary and brachial veins are   patent and compressible where applicable.  The basilic vein (superficial   vein) is patent and without thrombus.  The cephalic vein (superficial   vein) is patent and without thrombus.    Doppler examination shows normal spontaneous and phasic flow.    IMPRESSION:  No evidence of right upper extremity deep venous thrombosis.        --- End of Report ---    < end of copied text >  < from: CT Chest No Cont (12.05.23 @ 14:56) >    ACC: [id number] EXAM:  CT CHEST   ORDERED BY: MARY URRUTIA     PROCEDURE DATE:  12/05/2023          INTERPRETATION:  CLINICAL INFORMATION: Fever. Evaluate for pneumonia.    COMPARISON: November 10, 2023.    CONTRAST/COMPLICATIONS:  IV Contrast: None  Oral Contrast: None  Complications: Not applicable    PROCEDURE:  CT of the Chest was performed.  Sagittal and coronal reformats were performed.    FINDINGS:    LUNGS AND AIRWAYS: Patent central airways.  There are stable peripherally   distributed reticular opacities identified within the bilateral lung   parenchyma with comparison to examination November 10, 2023, without   evidence for interval development of infiltrate or lobar consolidation.   No significant traction bronchiectasis or honeycombing identified. Band   type opacity within the right lower lobe likely related to platelike   atelectasis/scarring.  PLEURA: No pleural effusion.  MEDIASTINUM AND NIRMALA: No lymphadenopathy.  VESSELS: Within normal limits.  HEART: Heart size is normal. No pericardial effusion.  CHEST WALL AND LOWER NECK: Within normal limits.  VISUALIZED UPPER ABDOMEN: Gallstones. The adrenal glands appear   unremarkable.  BONES: Degenerative changes.    IMPRESSION:  There are stable peripherally distributed reticular   opacities identified within the bilateral lung parenchyma with comparison   to examination November 10, 2023, without evidence for interval   development of infiltrate or lobar consolidation. No significant traction   bronchiectasis or honeycombing identified. Band type opacity within the   right lower lobe likely related to platelike atelectasis/scarring.        < end of copied text >              Assessment:  Patient with ILD, respiratory failure, was on ecmo, has been given rituximab, plasmapharesis and now on steroids, here for rehab. She had 9 days of antibiotics for enterococcal uti. Unclear why but wbc went up so will repeat cultures.   Plan:  continue atovaquone prophylaxis  repeat blood and urine cx  watch for diarrhea, will check cdif if it occurs CC: f/u for  uti  Patient reports  she is concerned she has a uti but has no dysuria, no flank pain, no fever, no bladder pain  REVIEW OF SYSTEMS:  All other review of systems negative (Comprehensive ROS)    Antimicrobials Day #  :  atovaquone  Suspension 1500 milliGRAM(s) Oral daily    Other Medications Reviewed    T(F): 98.3 (12-15-23 @ 09:11), Max: 98.3 (12-14-23 @ 20:32)  HR: 83 (12-15-23 @ 09:11)  BP: 100/60 (12-15-23 @ 09:11)  RR: 16 (12-15-23 @ 09:11)  SpO2: 93% (12-14-23 @ 20:32)  Wt(kg): --    PHYSICAL EXAM:  General: alert, no acute distress  Eyes:  anicteric, no conjunctival injection, no discharge  Oropharynx: no lesions or injection 	  Neck: supple, without adenopathy  Lungs: clear to auscultation  Heart: regular rate and rhythm; no murmur, rubs or gallops  Abdomen: soft, nondistended, nontender, without mass or organomegaly  Skin: no lesions  Extremities: no clubbing, cyanosis, or edema  Neurologic: alert, oriented, moves all extremities    LAB RESULTS:                        10.5   12.77 )-----------( 367      ( 14 Dec 2023 11:46 )             33.5     12-14    140  |  105  |  22  ----------------------------<  157<H>  3.8   |  27  |  0.80    Ca    9.3      14 Dec 2023 11:46    TPro  5.5<L>  /  Alb  2.8<L>  /  TBili  0.7  /  DBili  x   /  AST  24  /  ALT  48<H>  /  AlkPhos  114  12-14    LIVER FUNCTIONS - ( 14 Dec 2023 11:46 )  Alb: 2.8 g/dL / Pro: 5.5 g/dL / ALK PHOS: 114 U/L / ALT: 48 U/L / AST: 24 U/L / GGT: x           Urinalysis Basic - ( 14 Dec 2023 11:46 )    Color: x / Appearance: x / SG: x / pH: x  Gluc: 157 mg/dL / Ketone: x  / Bili: x / Urobili: x   Blood: x / Protein: x / Nitrite: x   Leuk Esterase: x / RBC: x / WBC x   Sq Epi: x / Non Sq Epi: x / Bacteria: x      MICROBIOLOGY:  RECENT CULTURES:      RADIOLOGY REVIEWED:  < from: US Duplex Venous Upper Ext Ltd, Right (12.11.23 @ 11:20) >  ACC: [id number] EXAM:  US DPLX UPR EXT VEINS LTD RT   ORDERED BY:   MONAE FLYNN     PROCEDURE DATE:  12/11/2023          INTERPRETATION:  CLINICAL INFORMATION: Rule out deep vein thrombosis.    COMPARISON: Venous Doppler dated 12/06/2023.    TECHNIQUE: Duplex sonography of the RIGHT UPPER extremity veins with   color and spectral Doppler, with and without compression.    FINDINGS:    The right internal jugular, subclavian, axillary and brachial veins are   patent and compressible where applicable.  The basilic vein (superficial   vein) is patent and without thrombus.  The cephalic vein (superficial   vein) is patent and without thrombus.    Doppler examination shows normal spontaneous and phasic flow.    IMPRESSION:  No evidence of right upper extremity deep venous thrombosis.        --- End of Report ---    < end of copied text >  < from: CT Chest No Cont (12.05.23 @ 14:56) >    ACC: [id number] EXAM:  CT CHEST   ORDERED BY: MARY URRUTIA     PROCEDURE DATE:  12/05/2023          INTERPRETATION:  CLINICAL INFORMATION: Fever. Evaluate for pneumonia.    COMPARISON: November 10, 2023.    CONTRAST/COMPLICATIONS:  IV Contrast: None  Oral Contrast: None  Complications: Not applicable    PROCEDURE:  CT of the Chest was performed.  Sagittal and coronal reformats were performed.    FINDINGS:    LUNGS AND AIRWAYS: Patent central airways.  There are stable peripherally   distributed reticular opacities identified within the bilateral lung   parenchyma with comparison to examination November 10, 2023, without   evidence for interval development of infiltrate or lobar consolidation.   No significant traction bronchiectasis or honeycombing identified. Band   type opacity within the right lower lobe likely related to platelike   atelectasis/scarring.  PLEURA: No pleural effusion.  MEDIASTINUM AND NIRMALA: No lymphadenopathy.  VESSELS: Within normal limits.  HEART: Heart size is normal. No pericardial effusion.  CHEST WALL AND LOWER NECK: Within normal limits.  VISUALIZED UPPER ABDOMEN: Gallstones. The adrenal glands appear   unremarkable.  BONES: Degenerative changes.    IMPRESSION:  There are stable peripherally distributed reticular   opacities identified within the bilateral lung parenchyma with comparison   to examination November 10, 2023, without evidence for interval   development of infiltrate or lobar consolidation. No significant traction   bronchiectasis or honeycombing identified. Band type opacity within the   right lower lobe likely related to platelike atelectasis/scarring.        < end of copied text >              Assessment:  Patient with ILD, respiratory failure, was on ecmo, has been given rituximab, plasmapharesis and now on steroids, here for rehab. She had 9 days of antibiotics for enterococcal uti. Unclear why but wbc went up so will repeat cultures.   Plan:  continue atovaquone prophylaxis  repeat blood and urine cx  watch for diarrhea, will check cdif if it occurs

## 2023-12-15 NOTE — PROGRESS NOTE ADULT - ASSESSMENT
63 y/o F with chronic AFib, hx sciatica, newly diagnosed ILD (likely associated with MCTD +ARIANA, +RNP, +Sm) with exacerbation requiring ECMO / plasmapharesis / Rituximab / steriods, now at acute rehab.    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 Remain on 4mg/day until next clinic visit  -c/w nebs  -Mepron for PCP ppx while on steroids   -Check ambulatory O2 sat periodically  -Continue comprehensive rehab program - PT/OT/SLP per rehab team  -Pain management, bowel regimen per rehab       #Elevated lactate levels  -at this time no signs or symptoms of infectious etiology  -possibly elevated due to dehydration. Patient does not want IVF but will increase PO hydration  -also potential contributers are steroid use/ILD  -given clinical stability, will monitor clinical signs of infection and trend lactate.     #Chronic macrocytic anemia  -H/H remains stable  -Continue MVI, folate and thiamine    #PAF  #Non-occlusive right IJ thrombus   -c/w Eliquis  -c/w lopressor 12.5mg po BID  -goal HR for pAfib is <110    #Anxiety  -appreciate psych follow-up  -c/w Xanax PRN    #?JACEY  #nocturnal desat  -discussed about CPAP use. oxygen use during sleep and PRN. patient may need OP sleep study. patient is aware. she will speak to her pulm. informed RN to check ambulatory O2 sat during therapy and at rest.     #Severe protein-calorie malnutrition  -Nutrition following    #DVT ppx: Eliquis 61 y/o F with chronic AFib, hx sciatica, newly diagnosed ILD (likely associated with MCTD +ARIANA, +RNP, +Sm) with exacerbation requiring ECMO / plasmapharesis / Rituximab / steriods, now at acute rehab.    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 Remain on 4mg/day until next clinic visit  -c/w nebs  -Mepron for PCP ppx while on steroids   -Check ambulatory O2 sat periodically  -Continue comprehensive rehab program - PT/OT/SLP per rehab team  -Pain management, bowel regimen per rehab       #Elevated lactate levels  -at this time no signs or symptoms of infectious etiology  -possibly elevated due to dehydration. Patient does not want IVF but will increase PO hydration  -also potential contributers are steroid use/ILD  -given clinical stability, will monitor clinical signs of infection and trend lactate.     #Chronic macrocytic anemia  -H/H remains stable  -Continue MVI, folate and thiamine    #PAF  #Non-occlusive right IJ thrombus   -c/w Eliquis  -c/w lopressor 12.5mg po BID  -goal HR for pAfib is <110    #Anxiety  -appreciate psych follow-up  -c/w Xanax PRN    #?JACEY  #nocturnal desat  -discussed about CPAP use. oxygen use during sleep and PRN. patient may need OP sleep study. patient is aware. she will speak to her pulm. informed RN to check ambulatory O2 sat during therapy and at rest.     #Severe protein-calorie malnutrition  -Nutrition following    #DVT ppx: Eliquis

## 2023-12-15 NOTE — PROGRESS NOTE ADULT - SUBJECTIVE AND OBJECTIVE BOX
[facility]  Dr. Yamilex Sanders  Progress Note    Patient is a 62y old  Female who presents with a chief complaint of Interstitial Lung Disease (12 Dec 2023 15:13)      Patient seen during therapy. Patient feels well. Says she was seen by Dr. Vasquez yesterday. Understands plan for her steroid taper. She denies any acute issues today. She is trying to drink more water.     ALLERGIES:  No Known Allergies    MEDICATIONS  (STANDING):  amoxicillin 500 milliGRAM(s) Oral three times a day  apixaban 5 milliGRAM(s) Oral every 12 hours  artificial  tears Solution 1 Drop(s) Both EYES every 12 hours  ascorbic acid 500 milliGRAM(s) Oral daily  atovaquone  Suspension 1500 milliGRAM(s) Oral daily  dextrose 5%. 1000 milliLiter(s) (100 mL/Hr) IV Continuous <Continuous>  dextrose 5%. 1000 milliLiter(s) (50 mL/Hr) IV Continuous <Continuous>  dextrose 50% Injectable 12.5 Gram(s) IV Push once  dextrose 50% Injectable 25 Gram(s) IV Push once  dextrose 50% Injectable 25 Gram(s) IV Push once  folic acid 1 milliGRAM(s) Oral daily  gabapentin 300 milliGRAM(s) Oral three times a day  glucagon  Injectable 1 milliGRAM(s) IntraMuscular once  lactobacillus acidophilus 1 Tablet(s) Oral two times a day with meals  lidocaine   4% Patch 2 Patch Transdermal <User Schedule>  lidocaine   4% Patch 1 Patch Transdermal <User Schedule>  melatonin 6 milliGRAM(s) Oral at bedtime  methylPREDNISolone 36 milliGRAM(s) Oral daily  metoprolol tartrate 12.5 milliGRAM(s) Oral two times a day  multivitamin 1 Tablet(s) Oral daily  nystatin Powder 1 Application(s) Topical two times a day  pantoprazole    Tablet 40 milliGRAM(s) Oral before breakfast  polyethylene glycol 3350 17 Gram(s) Oral <User Schedule>  sodium chloride 0.9% Bolus 1000 milliLiter(s) IV Bolus once  sodium chloride 0.9% lock flush 5 milliLiter(s) IV Push two times a day  sodium chloride 0.9%. 1000 milliLiter(s) (100 mL/Hr) IV Continuous <Continuous>  zinc oxide 40% Paste 1 Application(s) Topical three times a day    MEDICATIONS  (PRN):  acetaminophen     Tablet .. 650 milliGRAM(s) Oral every 6 hours PRN Temp greater or equal to 38C (100.4F), Mild Pain (1 - 3), Moderate Pain (4 - 6)  albuterol/ipratropium for Nebulization 3 milliLiter(s) Nebulizer every 6 hours PRN Shortness of Breath and/or Wheezing  ALPRAZolam 0.25 milliGRAM(s) Oral every 6 hours PRN Anxiety  aluminum hydroxide/magnesium hydroxide/simethicone Suspension 30 milliLiter(s) Oral every 4 hours PRN Dyspepsia  ammonium lactate 12% Lotion 1 Application(s) Topical every 12 hours PRN BL feet dryness  baclofen 5 milliGRAM(s) Oral every 8 hours PRN Musculoskeletal Pain  benzocaine/menthol Lozenge 1 Lozenge Oral two times a day PRN Sore Throat  benzonatate 100 milliGRAM(s) Oral three times a day PRN Cough  bisacodyl 5 milliGRAM(s) Oral daily PRN Constipation  dextrose Oral Gel 15 Gram(s) Oral once PRN Blood Glucose LESS THAN 70 milliGRAM(s)/deciliter  hydrocortisone hemorrhoidal Suppository 1 Suppository(s) Rectal two times a day PRN Hemorrhoids  loperamide 2 milliGRAM(s) Oral three times a day PRN Diarrhea  SIMETHICONE SOFTGELS 250 milliGRAM(s),SIMETHICONE SOFTGELS 250 milliGRAM(s) 250 milliGRAM(s) Oral three times a day PRN for gas  sodium chloride 0.65% Nasal 1 Spray(s) Both Nostrils every 8 hours PRN Nasal Congestion    Vital Signs Last 24 Hrs  T(C): 36.8 (15 Dec 2023 09:11), Max: 36.8 (14 Dec 2023 20:32)  T(F): 98.3 (15 Dec 2023 09:11), Max: 98.3 (14 Dec 2023 20:32)  HR: 83 (15 Dec 2023 09:11) (83 - 87)  BP: 100/60 (15 Dec 2023 09:11) (100/60 - 110/70)  BP(mean): --  RR: 16 (15 Dec 2023 09:11) (16 - 16)  SpO2: 93% (14 Dec 2023 20:32) (93% - 93%)    Parameters below as of 15 Dec 2023 09:11  Patient On (Oxygen Delivery Method): room air      PHYSICAL EXAM:  General: NAD, well appearing   ENT: MMM, no scleral icterus  Neck: Supple, No JVD  Lungs: Clear to auscultation bilaterally, no wheezes, rales, rhonchi  Cardio: RRR, S1/S2  Abdomen: Soft, Nontender, Nondistended; Bowel sounds present  Extremities: No calf tenderness, No pitting edema    LABS:                                   10.5   12.77 )-----------( 367      ( 14 Dec 2023 11:46 )             33.5   12-14    140  |  105  |  22  ----------------------------<  157<H>  3.8   |  27  |  0.80    Ca    9.3      14 Dec 2023 11:46    TPro  5.5<L>  /  Alb  2.8<L>  /  TBili  0.7  /  DBili  x   /  AST  24  /  ALT  48<H>  /  AlkPhos  114  12-14        Urinalysis Basic - ( 11 Dec 2023 08:51 )    Color: x / Appearance: x / SG: x / pH: x  Gluc: 130 mg/dL / Ketone: x  / Bili: x / Urobili: x   Blood: x / Protein: x / Nitrite: x   Leuk Esterase: x / RBC: x / WBC x   Sq Epi: x / Non Sq Epi: x / Bacteria: x      RADIOLOGY & ADDITIONAL TESTS:    Care Discussed with Consultants/Other Providers: yes, rehab   [facility]  Dr. Yamilex Sanders  Progress Note    Patient is a 62y old  Female who presents with a chief complaint of Interstitial Lung Disease (12 Dec 2023 15:13)      Patient seen during therapy. Patient feels well. Says she was seen by Dr. Vasquez yesterday. Understands plan for her steroid taper. She denies any acute issues today. She is trying to drink more water.     ALLERGIES:  No Known Allergies    MEDICATIONS  (STANDING):  amoxicillin 500 milliGRAM(s) Oral three times a day  apixaban 5 milliGRAM(s) Oral every 12 hours  artificial  tears Solution 1 Drop(s) Both EYES every 12 hours  ascorbic acid 500 milliGRAM(s) Oral daily  atovaquone  Suspension 1500 milliGRAM(s) Oral daily  dextrose 5%. 1000 milliLiter(s) (100 mL/Hr) IV Continuous <Continuous>  dextrose 5%. 1000 milliLiter(s) (50 mL/Hr) IV Continuous <Continuous>  dextrose 50% Injectable 12.5 Gram(s) IV Push once  dextrose 50% Injectable 25 Gram(s) IV Push once  dextrose 50% Injectable 25 Gram(s) IV Push once  folic acid 1 milliGRAM(s) Oral daily  gabapentin 300 milliGRAM(s) Oral three times a day  glucagon  Injectable 1 milliGRAM(s) IntraMuscular once  lactobacillus acidophilus 1 Tablet(s) Oral two times a day with meals  lidocaine   4% Patch 2 Patch Transdermal <User Schedule>  lidocaine   4% Patch 1 Patch Transdermal <User Schedule>  melatonin 6 milliGRAM(s) Oral at bedtime  methylPREDNISolone 36 milliGRAM(s) Oral daily  metoprolol tartrate 12.5 milliGRAM(s) Oral two times a day  multivitamin 1 Tablet(s) Oral daily  nystatin Powder 1 Application(s) Topical two times a day  pantoprazole    Tablet 40 milliGRAM(s) Oral before breakfast  polyethylene glycol 3350 17 Gram(s) Oral <User Schedule>  sodium chloride 0.9% Bolus 1000 milliLiter(s) IV Bolus once  sodium chloride 0.9% lock flush 5 milliLiter(s) IV Push two times a day  sodium chloride 0.9%. 1000 milliLiter(s) (100 mL/Hr) IV Continuous <Continuous>  zinc oxide 40% Paste 1 Application(s) Topical three times a day    MEDICATIONS  (PRN):  acetaminophen     Tablet .. 650 milliGRAM(s) Oral every 6 hours PRN Temp greater or equal to 38C (100.4F), Mild Pain (1 - 3), Moderate Pain (4 - 6)  albuterol/ipratropium for Nebulization 3 milliLiter(s) Nebulizer every 6 hours PRN Shortness of Breath and/or Wheezing  ALPRAZolam 0.25 milliGRAM(s) Oral every 6 hours PRN Anxiety  aluminum hydroxide/magnesium hydroxide/simethicone Suspension 30 milliLiter(s) Oral every 4 hours PRN Dyspepsia  ammonium lactate 12% Lotion 1 Application(s) Topical every 12 hours PRN BL feet dryness  baclofen 5 milliGRAM(s) Oral every 8 hours PRN Musculoskeletal Pain  benzocaine/menthol Lozenge 1 Lozenge Oral two times a day PRN Sore Throat  benzonatate 100 milliGRAM(s) Oral three times a day PRN Cough  bisacodyl 5 milliGRAM(s) Oral daily PRN Constipation  dextrose Oral Gel 15 Gram(s) Oral once PRN Blood Glucose LESS THAN 70 milliGRAM(s)/deciliter  hydrocortisone hemorrhoidal Suppository 1 Suppository(s) Rectal two times a day PRN Hemorrhoids  loperamide 2 milliGRAM(s) Oral three times a day PRN Diarrhea  SIMETHICONE SOFTGELS 250 milliGRAM(s),SIMETHICONE SOFTGELS 250 milliGRAM(s) 250 milliGRAM(s) Oral three times a day PRN for gas  sodium chloride 0.65% Nasal 1 Spray(s) Both Nostrils every 8 hours PRN Nasal Congestion    Vital Signs Last 24 Hrs  T(C): 36.8 (15 Dec 2023 09:11), Max: 36.8 (14 Dec 2023 20:32)  T(F): 98.3 (15 Dec 2023 09:11), Max: 98.3 (14 Dec 2023 20:32)  HR: 83 (15 Dec 2023 09:11) (83 - 87)  BP: 100/60 (15 Dec 2023 09:11) (100/60 - 110/70)  BP(mean): --  RR: 16 (15 Dec 2023 09:11) (16 - 16)  SpO2: 93% (14 Dec 2023 20:32) (93% - 93%)    Parameters below as of 15 Dec 2023 09:11  Patient On (Oxygen Delivery Method): room air      PHYSICAL EXAM:  General: NAD, well appearing   ENT: MMM, no scleral icterus  Neck: Supple, No JVD  Lungs: Clear to auscultation bilaterally, no wheezes, rales, rhonchi  Cardio: RRR, S1/S2  Abdomen: Soft, Nontender, Nondistended; Bowel sounds present  Extremities: No calf tenderness, No pitting edema    LABS:                                   10.5   12.77 )-----------( 367      ( 14 Dec 2023 11:46 )             33.5   12-14    140  |  105  |  22  ----------------------------<  157<H>  3.8   |  27  |  0.80    Ca    9.3      14 Dec 2023 11:46    TPro  5.5<L>  /  Alb  2.8<L>  /  TBili  0.7  /  DBili  x   /  AST  24  /  ALT  48<H>  /  AlkPhos  114  12-14        Urinalysis Basic - ( 11 Dec 2023 08:51 )    Color: x / Appearance: x / SG: x / pH: x  Gluc: 130 mg/dL / Ketone: x  / Bili: x / Urobili: x   Blood: x / Protein: x / Nitrite: x   Leuk Esterase: x / RBC: x / WBC x   Sq Epi: x / Non Sq Epi: x / Bacteria: x      RADIOLOGY & ADDITIONAL TESTS:    Care Discussed with Consultants/Other Providers: yes, rehab

## 2023-12-15 NOTE — PROGRESS NOTE ADULT - ASSESSMENT
Assessment/Plan:  ALIZA FOLEY is a 62 year old female with PMH of pAFIB and sciatica; who presented to [facility] ED with SOB, and was found to have groundglass opacities and interstitial lung disease. She was treated with empiric Vancomycin and Zosyn. She underwent a bronchoscopy with bronchoalveolar lavage and pulse steroids. She was given IV diuretics for increased pulmonary congestion, but her respiratory status continued to worsen.  On 9/13, she was transferred to [facility] for ECMO requirements. She was further treated with Plasmapheresis, Rituximab and high-dose steroids, with significant improvement. Her overall hospital course was complicated by thrombocytopenia (s/p several platelet transfusions), leukocytosis (infectious workup entirely negative- s/p Vanco and Ceftriaxone), AFIB with RVR (resolved with Metoprolol), dysphagia (requiring NGT), transaminitis (secondary to acute illness and hypotension),  non-occlusive RIJ DVT (started on Lovenox). Patient now admitted for a multidisciplinary rehab program. 10-05-23 @ 13:18    * Sustained functional improvement--increasing ambulatory distance to 160 ft yesterday  * Therapy to focuson progressive ambulation and hip extensor strengthening  * Await outcome of expedited appeal to denial of acute rehab treatment  * Repeat lactate 12/14-down trending 3.6, probably multifactorial--steroid treatment, ILD, will monitor, no signs of acute infection   * Rheumatology f/u review appreciated and d/w patient      #Interstitial Lung Disease and Mixed connective tissue disease  - Gait Instability, ADL impairments and Functional impairments: start Comprehensive Rehab Program of PT/OT/SLP - 3 hours a day, 5 days a week  - P&O as needed   - Non-specific Interstitial Lung Disease  - Requiring ECMO   - Improvement s/p Plasmapheresis, Rituximab and high- dose steroids   - Albuterol/Ipratropium Nebulizer every 6 hours  - Mepron 1500mg daily  - PO Medrol ( due to liver dysfunction): Plan will be to taper by ~20% every 2 weeks    Medrol   64mg x 2 weeks   56mg x 2 weeks  44mg x 2 weeks   36mg x 2 weeks - Follow up Outpatient   - Plan to wean 02 as tolerated, Continue tapering oxygen,  -  CT Chest noted 11/10---Resp f/u review  11/14, appreciated  They reports sustained improvement, clinical (normal oxy sats on R/A, sustained progress with oxy tapering), and radiological (no acute findings on recent CT Chest, rather residual chronic infiltrative diesease noted, with recommendation to repeat CT after Acute rehab treatment   Rheumat rec Dr Vasquez 12/14  - Taper to Medrol 28mg x 2 weeks then 20mg x 2 weeks, then can taper by 4mg qweekly - 16mg, 12mg, 8mg. Remain on 4mg/day until next clinic visit   - continue with PCP ppx with Mepron and GI ppx with PPI while on steroids   - no further Rituxan needed at present time  - pt will be going to [place] shortly following hospital discharge - will have labs done prior to leaving, paper lab orders provided to her today, will arrange rheum f/u with Dr Escobar once back from [place] in late March so can facilitate next Rituxan dosing.   - Provide 1 month of medrol on discharge, my office will continue rx until she is seen by Dr Escobar. Pt has also been advised to find a rheumatologist where she will be in case of intermediate emergencies       #Fever  - Recent UTI on Bactrim (since DCd)  - UCX with enterococcus faecalis  - 101.4 fever overnight (12/4-12/5)  - Lactate of 2.5- patient refused IV fluids   - Start Zosyn IVPB 12/6 (pt agreeable)  - RVP negative  - F/U blood cultures, CXR  - ID consult and Pulm re-eval (no respiratory symptoms)  - Pulm recs: continue to monitor   - ID recs: CT chest- stable (12/5)   - Repeat Lactate of 3.1 - agreeable to IV fluids- s/p 1000mL bolus   - BL LE doppler- negative   --UTI--treated     #Cankre sore--resolved    #Knee buckling--knee strength improving  - Back muscle strain with catching self on RW   - Lidocaine patch to back and BL deltoids    #Experiencing R mandible and occipital numbness, with associated hair loss- outpatient follow up with Rheumatology    #Posttnasal drip--ENT review 11/7, declined scope, continue flonase     #pAFIB/Rt Ij thrombus  - Metoprolol 25mg BID and lovenox  ---Eliquis 5mg BID - tolerating well   -- RUE venous duplex check for resolution of know DVT 12/11    #Chronic Tinnitus   - ENT review and recs appreciate, Patient already made ENT appointment for f/u    #Lymphedema both legs -chronic and Rt IJ thrombus  - ACE Wrap BL LEs  - BL LE doppler negative for DVT  - BL UE doppler with chronic thrombotic changes in RIJ     #Transaminitis --LFT Down trending   - Secondary to acute illness and hypotension at [facility]  - Outpatient follow up     #Leucocytosis--steroid related, continue monitoring   # Elevated Lactate--downtrending 3.6 on 12/14 will monitor, no signs of acute infection     #Neuropathy  - Gabapentin 300 mg BID, will consider increasing dose, increase to TID 11/10  --Work on motor strengthening, priority for UE as preferred by patient   - Vit b12 >2,000 in 9/2023  -- Rhuematology review 12/14 as noted,    #Sleep/Mood  - Melatonin 6mg at HS  - Psych rec Xanax 0.25mg PRN    #Skin  - Skin: Left lower abdomen ruptured blister 3.0 x 1.0, stage 2 pressure injury to right buttock 4 x1 and left buttock 3x1, stage 2 pressure injury ti right inner thigh 0.2 x 0.2, right groin wound 10.5 x 2.0,   Wound care review, Left groin wound 11/15--- 0.6x1.8.0.1cm, no slough, healthy base  -- MAD to skin folds- Nystatin to submammary and abdominal pannus BID  -- MAD to L groin- cleanse with NS, Triad cream daily  -- Mad to R groin- Nystatin powder daily   -- R groin wound-- aquacel packing, Triad to periwound, Allevyn foam- daily and PRN    - Offload pressure, low air loss support surfaces, T&P every 2 hours   --Wound care consult-10/9 -Multiple wounds--perineal and buttock/sacral, recs appreciated   --Suggest Continue treatments as below:   Twice daily & PRN Normal Saline Cleanse of abdominal pannus & breast folds, perineal, Left & Right inner buttock erosions.  Pat thoroughly dry.   Apply Desitin generously twice daily (& PRN) to perineal, buttocks, and left groin erosions, leave open to air.    Apply Nystatin powder to abdominal pannus and breast folds.  Suggest use of Interdry cloths in folds to 'wick' moisture.  Suggest daily Normal Saline Cleanse of right groin surgical wound, pat dry.  Apply Cavillon film barrier to intact periwound skin.  Place Aquacell strip over open area, cover with foam dressing.  - Wound care following     #Pain Mgmt   - Tylenol PRN   - Gabapentin 300mg at HS     #GI/Bowel Mgmt/Hx of alternating bowel movements  - Pantoprazole  - PRN: Maalox   --Lactobacillus BID     #/Bladder Mgmt   -Spontaneously voiding   - UA (11/3) negative  - +UTI- Amoxicillin  completed  - 12/14---Elevated lactate--f/u repeat level and other labs     #FEN   #Dysphagia  - Diet - Minced & Moist  [CC]    - Dysphagia- SLP evaluation appreciated     #Health maintenance  - Folic Acid   - MVI  - Ocean nasal spray    # Difficulty with access to peripheral veins-- Blood draws from Left arm Medline --RUE F/U venous duplex 12/11    #Precautions / PROPHYLAXIS:   - Falls  - ortho: Weight bearing status: WBAT   - Lungs: Aspiration, Incentive Spirometer   - DVT PPX: Eliquis 5 mg BID   ----------------------------------------  12/11- --Labs CBC mild anemia, normal renal function, LFT elevated, downtrending overall unremarkable   12/14--* elevated lactate downtrending     Health maintenance--await response from insurance company for the expedited appeal regarding acute rehab treatment   ----------------------------------------  Liaison with other providers/agencies    IDT conference on 12/5  Social Work: Awaiting insurance response on clinicals  SLP: -- n/a  OT: Setup for eating/grooming. min assistance for ADLs, mod assistance for toileting  Shower transfers mod assistance x 1  Independent with bed mobility  Ambulating via light gate 100--120ft   Amb 65 ft with bariatric RW and WCF with min A. 6 inch step mod A x2.   Contact guard for stand pivot transfers  Goal--min assist with ADLs  RT--Engaging for activity planning and relaxation exercises  DME: Bariatric RW, WC, drop arm commode  Barriers: New fevers  TDD: 12/20 to home.  ----------------------------------------  OUTPATIENT/FOLLOW UP:    Trae Whitney  Pulmonary Disease  [address], [address] [place] [address]  Phone: [phone]  Fax: [phone]  Follow Up Time: 2 weeks    Ryanne Allen  Rheumatology  [address] [address]  Phone: [phone]  Fax: [phone]  Follow Up Time: 1 week    Kyle Pierce [place]  Internal Medicine  [address] [place] [address], [address]  [place] [address]  Phone: [phone]  Fax: [phone]  Follow Up Time: 2 weeks    Addy Powers  Pulmonary Disease  [address] [facility], [address] [id number]  Phone: [phone]  Fax: [phone]  Follow Up Time:     Kwame Hawley [facility]  Critical Care Medicine  [address] [facility], [address]  [place] [address]  Phone: [phone]  Fax: [phone]  Follow Up Time:     Neena Borrego  Rheumatology  [address] [facility], Floor 3  [place] [address]  Phone: [phone]  Fax: [phone]  Follow Up Time:    [facility]  [address]  Scheduled Appointment: 09/13/2023  2:40 PM      Non critical care  Time based billing  Spent 62 mins, patient review, discussion with patient and family, discussion regarding labs and Rheumat consult recs, and treatment plan, observation in therapy. and care co ordination  Assessment/Plan:  ALIZA FOLEY is a 62 year old female with PMH of pAFIB and sciatica; who presented to [facility] ED with SOB, and was found to have groundglass opacities and interstitial lung disease. She was treated with empiric Vancomycin and Zosyn. She underwent a bronchoscopy with bronchoalveolar lavage and pulse steroids. She was given IV diuretics for increased pulmonary congestion, but her respiratory status continued to worsen.  On 9/13, she was transferred to [facility] for ECMO requirements. She was further treated with Plasmapheresis, Rituximab and high-dose steroids, with significant improvement. Her overall hospital course was complicated by thrombocytopenia (s/p several platelet transfusions), leukocytosis (infectious workup entirely negative- s/p Vanco and Ceftriaxone), AFIB with RVR (resolved with Metoprolol), dysphagia (requiring NGT), transaminitis (secondary to acute illness and hypotension),  non-occlusive RIJ DVT (started on Lovenox). Patient now admitted for a multidisciplinary rehab program. 10-05-23 @ 13:18    * Sustained functional improvement--increasing ambulatory distance to 160 ft yesterday  * Therapy to focuson progressive ambulation and hip extensor strengthening  * Await outcome of expedited appeal to denial of acute rehab treatment  * Repeat lactate 12/14-down trending 3.6, probably multifactorial--steroid treatment, ILD, will monitor, no signs of acute infection   * Rheumatology f/u review appreciated and d/w patient      #Interstitial Lung Disease and Mixed connective tissue disease  - Gait Instability, ADL impairments and Functional impairments: start Comprehensive Rehab Program of PT/OT/SLP - 3 hours a day, 5 days a week  - P&O as needed   - Non-specific Interstitial Lung Disease  - Requiring ECMO   - Improvement s/p Plasmapheresis, Rituximab and high- dose steroids   - Albuterol/Ipratropium Nebulizer every 6 hours  - Mepron 1500mg daily  - PO Medrol ( due to liver dysfunction): Plan will be to taper by ~20% every 2 weeks    Medrol   64mg x 2 weeks   56mg x 2 weeks  44mg x 2 weeks   36mg x 2 weeks - Follow up Outpatient   - Plan to wean 02 as tolerated, Continue tapering oxygen,  -  CT Chest noted 11/10---Resp f/u review  11/14, appreciated  They reports sustained improvement, clinical (normal oxy sats on R/A, sustained progress with oxy tapering), and radiological (no acute findings on recent CT Chest, rather residual chronic infiltrative diesease noted, with recommendation to repeat CT after Acute rehab treatment   Rheumat rec Dr Vasquez 12/14  - Taper to Medrol 28mg x 2 weeks then 20mg x 2 weeks, then can taper by 4mg qweekly - 16mg, 12mg, 8mg. Remain on 4mg/day until next clinic visit   - continue with PCP ppx with Mepron and GI ppx with PPI while on steroids   - no further Rituxan needed at present time  - pt will be going to [place] shortly following hospital discharge - will have labs done prior to leaving, paper lab orders provided to her today, will arrange rheum f/u with Dr Escobar once back from [place] in late March so can facilitate next Rituxan dosing.   - Provide 1 month of medrol on discharge, my office will continue rx until she is seen by Dr Escobar. Pt has also been advised to find a rheumatologist where she will be in case of intermediate emergencies       #Fever  - Recent UTI on Bactrim (since DCd)  - UCX with enterococcus faecalis  - 101.4 fever overnight (12/4-12/5)  - Lactate of 2.5- patient refused IV fluids   - Start Zosyn IVPB 12/6 (pt agreeable)  - RVP negative  - F/U blood cultures, CXR  - ID consult and Pulm re-eval (no respiratory symptoms)  - Pulm recs: continue to monitor   - ID recs: CT chest- stable (12/5)   - Repeat Lactate of 3.1 - agreeable to IV fluids- s/p 1000mL bolus   - BL LE doppler- negative   --UTI--treated     #Cankre sore--resolved    #Knee buckling--knee strength improving  - Back muscle strain with catching self on RW   - Lidocaine patch to back and BL deltoids    #Experiencing R mandible and occipital numbness, with associated hair loss- outpatient follow up with Rheumatology    #Posttnasal drip--ENT review 11/7, declined scope, continue flonase     #pAFIB/Rt Ij thrombus  - Metoprolol 25mg BID and lovenox  ---Eliquis 5mg BID - tolerating well   -- RUE venous duplex check for resolution of know DVT 12/11    #Chronic Tinnitus   - ENT review and recs appreciate, Patient already made ENT appointment for f/u    #Lymphedema both legs -chronic and Rt IJ thrombus  - ACE Wrap BL LEs  - BL LE doppler negative for DVT  - BL UE doppler with chronic thrombotic changes in RIJ     #Transaminitis --LFT Down trending   - Secondary to acute illness and hypotension at [facility]  - Outpatient follow up     #Leucocytosis--steroid related, continue monitoring   # Elevated Lactate--downtrending 3.6 on 12/14 will monitor, no signs of acute infection     #Neuropathy  - Gabapentin 300 mg BID, will consider increasing dose, increase to TID 11/10  --Work on motor strengthening, priority for UE as preferred by patient   - Vit b12 >2,000 in 9/2023  -- Rhuematology review 12/14 as noted,    #Sleep/Mood  - Melatonin 6mg at HS  - Psych rec Xanax 0.25mg PRN    #Skin  - Skin: Left lower abdomen ruptured blister 3.0 x 1.0, stage 2 pressure injury to right buttock 4 x1 and left buttock 3x1, stage 2 pressure injury ti right inner thigh 0.2 x 0.2, right groin wound 10.5 x 2.0,   Wound care review, Left groin wound 11/15--- 0.6x1.8.0.1cm, no slough, healthy base  -- MAD to skin folds- Nystatin to submammary and abdominal pannus BID  -- MAD to L groin- cleanse with NS, Triad cream daily  -- Mad to R groin- Nystatin powder daily   -- R groin wound-- aquacel packing, Triad to periwound, Allevyn foam- daily and PRN    - Offload pressure, low air loss support surfaces, T&P every 2 hours   --Wound care consult-10/9 -Multiple wounds--perineal and buttock/sacral, recs appreciated   --Suggest Continue treatments as below:   Twice daily & PRN Normal Saline Cleanse of abdominal pannus & breast folds, perineal, Left & Right inner buttock erosions.  Pat thoroughly dry.   Apply Desitin generously twice daily (& PRN) to perineal, buttocks, and left groin erosions, leave open to air.    Apply Nystatin powder to abdominal pannus and breast folds.  Suggest use of Interdry cloths in folds to 'wick' moisture.  Suggest daily Normal Saline Cleanse of right groin surgical wound, pat dry.  Apply Cavillon film barrier to intact periwound skin.  Place Aquacell strip over open area, cover with foam dressing.  - Wound care following     #Pain Mgmt   - Tylenol PRN   - Gabapentin 300mg at HS     #GI/Bowel Mgmt/Hx of alternating bowel movements  - Pantoprazole  - PRN: Maalox   --Lactobacillus BID     #/Bladder Mgmt   -Spontaneously voiding   - UA (11/3) negative  - +UTI- Amoxicillin  completed  - 12/14---Elevated lactate--f/u repeat level and other labs     #FEN   #Dysphagia  - Diet - Minced & Moist  [CC]    - Dysphagia- SLP evaluation appreciated     #Health maintenance  - Folic Acid   - MVI  - Ocean nasal spray    # Difficulty with access to peripheral veins-- Blood draws from Left arm Medline --RUE F/U venous duplex 12/11    #Precautions / PROPHYLAXIS:   - Falls  - ortho: Weight bearing status: WBAT   - Lungs: Aspiration, Incentive Spirometer   - DVT PPX: Eliquis 5 mg BID   ----------------------------------------  12/11- --Labs CBC mild anemia, normal renal function, LFT elevated, downtrending overall unremarkable   12/14--* elevated lactate downtrending     Health maintenance--await response from insurance company for the expedited appeal regarding acute rehab treatment   ----------------------------------------  Liaison with other providers/agencies    IDT conference on 12/5  Social Work: Awaiting insurance response on clinicals  SLP: -- n/a  OT: Setup for eating/grooming. min assistance for ADLs, mod assistance for toileting  Shower transfers mod assistance x 1  Independent with bed mobility  Ambulating via light gate 100--120ft   Amb 65 ft with bariatric RW and WCF with min A. 6 inch step mod A x2.   Contact guard for stand pivot transfers  Goal--min assist with ADLs  RT--Engaging for activity planning and relaxation exercises  DME: Bariatric RW, WC, drop arm commode  Barriers: New fevers  TDD: 12/20 to home.  ----------------------------------------  OUTPATIENT/FOLLOW UP:    Trae Whitney  Pulmonary Disease  [address], [address] [place] [address]  Phone: [phone]  Fax: [phone]  Follow Up Time: 2 weeks    Ryanne Allen  Rheumatology  [address] [address]  Phone: [phone]  Fax: [phone]  Follow Up Time: 1 week    Kyle Pierce [place]  Internal Medicine  [address] [place] [address], [address]  [place] [address]  Phone: [phone]  Fax: [phone]  Follow Up Time: 2 weeks    Addy Powers  Pulmonary Disease  [address] [facility], [address] [id number]  Phone: [phone]  Fax: [phone]  Follow Up Time:     Kwame Hawley [place]  Critical Care Medicine  [address] [facility], [address]  [place] [address]  Phone: [phone]  Fax: [phone]  Follow Up Time:     Neena Borrego  Rheumatology  [address] [facility], [address]  [place] [address]  Phone: [phone]  Fax: [phone]  Follow Up Time:    [facility]  [address]  Scheduled Appointment: 09/13/2023  2:40 PM      Non critical care  Time based billing  Spent 62 mins, patient review, discussion with patient and family, discussion regarding labs and Rheumat consult recs, and treatment plan, observation in therapy. and care co ordination

## 2023-12-16 LAB
ALBUMIN SERPL ELPH-MCNC: 3.7 G/DL
ALP BLD-CCNC: 113 U/L
ALT SERPL-CCNC: 41 U/L
ANION GAP SERPL CALC-SCNC: 13 MMOL/L
AST SERPL-CCNC: 25 U/L
BILIRUB SERPL-MCNC: 0.6 MG/DL
BUN SERPL-MCNC: 24 MG/DL
CALCIUM SERPL-MCNC: 9.8 MG/DL
CHLORIDE SERPL-SCNC: 101 MMOL/L
CK SERPL-CCNC: 14 U/L
CO2 SERPL-SCNC: 24 MMOL/L
CREAT SERPL-MCNC: 0.53 MG/DL
CRP SERPL-MCNC: 3 MG/L
EGFR: 103 ML/MIN/1.73M2
ERYTHROCYTE [SEDIMENTATION RATE] IN BLOOD BY WESTERGREN METHOD: 10 MM/HR
GLUCOSE SERPL-MCNC: 137 MG/DL
POTASSIUM SERPL-SCNC: 4.2 MMOL/L
PROT SERPL-MCNC: 5.3 G/DL
SODIUM SERPL-SCNC: 138 MMOL/L

## 2023-12-16 PROCEDURE — 99232 SBSQ HOSP IP/OBS MODERATE 35: CPT

## 2023-12-16 RX ADMIN — APIXABAN 5 MILLIGRAM(S): 2.5 TABLET, FILM COATED ORAL at 09:14

## 2023-12-16 RX ADMIN — NYSTATIN CREAM 1 APPLICATION(S): 100000 CREAM TOPICAL at 14:57

## 2023-12-16 RX ADMIN — ZINC OXIDE 1 APPLICATION(S): 200 OINTMENT TOPICAL at 14:57

## 2023-12-16 RX ADMIN — APIXABAN 5 MILLIGRAM(S): 2.5 TABLET, FILM COATED ORAL at 20:54

## 2023-12-16 RX ADMIN — Medication 1 TABLET(S): at 20:54

## 2023-12-16 RX ADMIN — Medication 12.5 MILLIGRAM(S): at 20:53

## 2023-12-16 RX ADMIN — Medication 6 MILLIGRAM(S): at 20:58

## 2023-12-16 RX ADMIN — ATOVAQUONE 1500 MILLIGRAM(S): 750 SUSPENSION ORAL at 20:53

## 2023-12-16 RX ADMIN — GABAPENTIN 300 MILLIGRAM(S): 400 CAPSULE ORAL at 20:58

## 2023-12-16 RX ADMIN — Medication 1 TABLET(S): at 09:14

## 2023-12-16 RX ADMIN — GABAPENTIN 300 MILLIGRAM(S): 400 CAPSULE ORAL at 09:14

## 2023-12-16 RX ADMIN — Medication 1 MILLIGRAM(S): at 20:54

## 2023-12-16 RX ADMIN — GABAPENTIN 300 MILLIGRAM(S): 400 CAPSULE ORAL at 15:00

## 2023-12-16 RX ADMIN — Medication 28 MILLIGRAM(S): at 09:14

## 2023-12-16 RX ADMIN — PANTOPRAZOLE SODIUM 40 MILLIGRAM(S): 20 TABLET, DELAYED RELEASE ORAL at 09:14

## 2023-12-16 RX ADMIN — Medication 500 MILLIGRAM(S): at 20:53

## 2023-12-16 NOTE — PROGRESS NOTE ADULT - ASSESSMENT
63 y/o F with chronic AFib, hx sciatica, newly diagnosed ILD (likely associated with MCTD +ARIANA, +RNP, +Sm) with exacerbation requiring ECMO / plasmapharesis / Rituximab / steriods, now at acute rehab.    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 Remain on 4mg/day until next clinic visit  -c/w nebs  -Mepron for PCP ppx while on steroids   -Check ambulatory O2 sat periodically  -Continue comprehensive rehab program - PT/OT/SLP per rehab team  -Pain management, bowel regimen per rehab       #Elevated lactate levels  -at this time no signs or symptoms of infectious etiology  -possibly elevated due to dehydration. Patient does not want IVF but will increase PO hydration  -also potential contributers are steroid use/ILD  -given clinical stability, will monitor clinical signs of infection and trend lactate.     #Chronic macrocytic anemia  -H/H remains stable  -Continue MVI, folate and thiamine    #PAF  #Non-occlusive right IJ thrombus   -c/w Eliquis  -c/w lopressor 12.5mg po BID  -goal HR for pAfib is <110    #Anxiety  -appreciate psych follow-up  -c/w Xanax PRN    #?JACEY  #nocturnal desat  -discussed about CPAP use. oxygen use during sleep and PRN. patient may need OP sleep study. patient is aware. she will speak to her pulm.    #Severe protein-calorie malnutrition  -Nutrition following    #DVT ppx: Eliquis 61 y/o F with chronic AFib, hx sciatica, newly diagnosed ILD (likely associated with MCTD +ARIANA, +RNP, +Sm) with exacerbation requiring ECMO / plasmapharesis / Rituximab / steriods, now at acute rehab.    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 Remain on 4mg/day until next clinic visit  -c/w nebs  -Mepron for PCP ppx while on steroids   -Check ambulatory O2 sat periodically  -Continue comprehensive rehab program - PT/OT/SLP per rehab team  -Pain management, bowel regimen per rehab       #Elevated lactate levels  -at this time no signs or symptoms of infectious etiology  -possibly elevated due to dehydration. Patient does not want IVF but will increase PO hydration  -also potential contributers are steroid use/ILD  -given clinical stability, will monitor clinical signs of infection and trend lactate.     #Chronic macrocytic anemia  -H/H remains stable  -Continue MVI, folate and thiamine    #PAF  #Non-occlusive right IJ thrombus   -c/w Eliquis  -c/w lopressor 12.5mg po BID  -goal HR for pAfib is <110    #Anxiety  -appreciate psych follow-up  -c/w Xanax PRN    #?JACEY  #nocturnal desat  -discussed about CPAP use. oxygen use during sleep and PRN. patient may need OP sleep study. patient is aware. she will speak to her pulm.    #Severe protein-calorie malnutrition  -Nutrition following    #DVT ppx: Eliquis

## 2023-12-16 NOTE — PROGRESS NOTE ADULT - SUBJECTIVE AND OBJECTIVE BOX
PROGRESS NOTE:     Patient is a 62y old  Female who presents with a chief complaint of Interstitial Lung Disease (15 Dec 2023 15:05)          SUBJECTIVE & OBJECTIVE:   Pt seen and examined at bedside in AM    no overnight events.   no new complaints    REVIEW OF SYSTEMS: remaining ROS negative     PHYSICAL EXAM:  VITALS:  Vital Signs Last 24 Hrs  T(C): 37.2 (16 Dec 2023 09:17), Max: 37.2 (16 Dec 2023 09:17)  T(F): 98.9 (16 Dec 2023 09:17), Max: 98.9 (16 Dec 2023 09:17)  HR: 82 (16 Dec 2023 09:17) (82 - 84)  BP: 105/68 (16 Dec 2023 09:17) (105/68 - 105/73)  BP(mean): --  RR: 16 (16 Dec 2023 09:17) (16 - 16)  SpO2: 97% (16 Dec 2023 09:17) (97% - 97%)    Parameters below as of 16 Dec 2023 09:17  Patient On (Oxygen Delivery Method): room air          GENERAL: NAD,  no increased WOB  HEAD:  Atraumatic, Normocephalic  EYES: EOMI, conjunctiva and sclera clear  ENMT: Moist mucous membranes  NECK: Supple, No JVD  NERVOUS SYSTEM:  Alert & Oriented   CHEST/LUNG: Clear to auscultation bilaterally; No rales, rhonchi, wheezing  HEART: Regular rate and rhythm; S1 S2  ABDOMEN: Soft, Nontender, Nondistended; Bowel sounds present  EXTREMITIES:  No clubbing, cyanosis, calf tenderness or edema b/l      MEDICATIONS  (STANDING):  apixaban 5 milliGRAM(s) Oral every 12 hours  artificial  tears Solution 1 Drop(s) Both EYES every 12 hours  ascorbic acid 500 milliGRAM(s) Oral daily  atovaquone  Suspension 1500 milliGRAM(s) Oral daily  dextrose 5%. 1000 milliLiter(s) (100 mL/Hr) IV Continuous <Continuous>  dextrose 5%. 1000 milliLiter(s) (50 mL/Hr) IV Continuous <Continuous>  dextrose 50% Injectable 12.5 Gram(s) IV Push once  dextrose 50% Injectable 25 Gram(s) IV Push once  dextrose 50% Injectable 25 Gram(s) IV Push once  folic acid 1 milliGRAM(s) Oral daily  gabapentin 300 milliGRAM(s) Oral three times a day  glucagon  Injectable 1 milliGRAM(s) IntraMuscular once  lactobacillus acidophilus 1 Tablet(s) Oral two times a day with meals  lidocaine   4% Patch 2 Patch Transdermal <User Schedule>  lidocaine   4% Patch 1 Patch Transdermal <User Schedule>  melatonin 6 milliGRAM(s) Oral at bedtime  methylPREDNISolone 28 milliGRAM(s) Oral daily  metoprolol tartrate 12.5 milliGRAM(s) Oral two times a day  multivitamin 1 Tablet(s) Oral daily  nystatin Powder 1 Application(s) Topical two times a day  pantoprazole    Tablet 40 milliGRAM(s) Oral before breakfast  polyethylene glycol 3350 17 Gram(s) Oral <User Schedule>  sodium chloride 0.9% Bolus 1000 milliLiter(s) IV Bolus once  sodium chloride 0.9% lock flush 5 milliLiter(s) IV Push two times a day  sodium chloride 0.9%. 1000 milliLiter(s) (100 mL/Hr) IV Continuous <Continuous>  zinc oxide 40% Paste 1 Application(s) Topical three times a day    MEDICATIONS  (PRN):  acetaminophen     Tablet .. 650 milliGRAM(s) Oral every 6 hours PRN Temp greater or equal to 38C (100.4F), Mild Pain (1 - 3), Moderate Pain (4 - 6)  albuterol/ipratropium for Nebulization 3 milliLiter(s) Nebulizer every 6 hours PRN Shortness of Breath and/or Wheezing  ALPRAZolam 0.25 milliGRAM(s) Oral every 6 hours PRN Anxiety  aluminum hydroxide/magnesium hydroxide/simethicone Suspension 30 milliLiter(s) Oral every 4 hours PRN Dyspepsia  ammonium lactate 12% Lotion 1 Application(s) Topical every 12 hours PRN BL feet dryness  baclofen 5 milliGRAM(s) Oral every 8 hours PRN Musculoskeletal Pain  benzocaine/menthol Lozenge 1 Lozenge Oral two times a day PRN Sore Throat  benzonatate 100 milliGRAM(s) Oral three times a day PRN Cough  bisacodyl 5 milliGRAM(s) Oral daily PRN Constipation  dextrose Oral Gel 15 Gram(s) Oral once PRN Blood Glucose LESS THAN 70 milliGRAM(s)/deciliter  hydrocortisone hemorrhoidal Suppository 1 Suppository(s) Rectal two times a day PRN Hemorrhoids  loperamide 2 milliGRAM(s) Oral three times a day PRN Diarrhea  SIMETHICONE SOFTGELS 250 milliGRAM(s),SIMETHICONE SOFTGELS 250 milliGRAM(s) 250 milliGRAM(s) Oral three times a day PRN for gas  sodium chloride 0.65% Nasal 1 Spray(s) Both Nostrils every 8 hours PRN Nasal Congestion      Allergies    No Known Allergies    Intolerances              LABS:               Urinalysis Basic - ( 15 Dec 2023 21:45 )    Color: Yellow / Appearance: Clear / S.027 / pH: x  Gluc: x / Ketone: Negative mg/dL  / Bili: Negative / Urobili: 0.2 mg/dL   Blood: x / Protein: Negative mg/dL / Nitrite: Negative   Leuk Esterase: Negative / RBC: 1 /HPF / WBC 2 /HPF   Sq Epi: x / Non Sq Epi: x / Bacteria: Occasional /HPF      CAPILLARY BLOOD GLUCOSE                    RECENT CULTURES:      RADIOLOGY & ADDITIONAL TESTS:    Care Discussed with Consultants/Other Providers: yes, rehab

## 2023-12-16 NOTE — PROGRESS NOTE ADULT - SUBJECTIVE AND OBJECTIVE BOX
Cc: Gait dysfunction    HPI: Patient seen and examined at bedside. No acute events overnight. Looking foward to more therapy.   Pain controlled, no chest pain, no N/V, no Fevers/Chills. No other new ROS  Has been tolerating rehabilitation program.    acetaminophen     Tablet .. 650 milliGRAM(s) Oral every 6 hours PRN  albuterol/ipratropium for Nebulization 3 milliLiter(s) Nebulizer every 6 hours PRN  ALPRAZolam 0.25 milliGRAM(s) Oral every 6 hours PRN  aluminum hydroxide/magnesium hydroxide/simethicone Suspension 30 milliLiter(s) Oral every 4 hours PRN  ammonium lactate 12% Lotion 1 Application(s) Topical every 12 hours PRN  apixaban 5 milliGRAM(s) Oral every 12 hours  artificial  tears Solution 1 Drop(s) Both EYES every 12 hours  ascorbic acid 500 milliGRAM(s) Oral daily  atovaquone  Suspension 1500 milliGRAM(s) Oral daily  baclofen 5 milliGRAM(s) Oral every 8 hours PRN  benzocaine/menthol Lozenge 1 Lozenge Oral two times a day PRN  benzonatate 100 milliGRAM(s) Oral three times a day PRN  bisacodyl 5 milliGRAM(s) Oral daily PRN  dextrose 5%. 1000 milliLiter(s) IV Continuous <Continuous>  dextrose 5%. 1000 milliLiter(s) IV Continuous <Continuous>  dextrose 50% Injectable 25 Gram(s) IV Push once  dextrose 50% Injectable 25 Gram(s) IV Push once  dextrose 50% Injectable 12.5 Gram(s) IV Push once  dextrose Oral Gel 15 Gram(s) Oral once PRN  folic acid 1 milliGRAM(s) Oral daily  gabapentin 300 milliGRAM(s) Oral three times a day  glucagon  Injectable 1 milliGRAM(s) IntraMuscular once  hydrocortisone hemorrhoidal Suppository 1 Suppository(s) Rectal two times a day PRN  lactobacillus acidophilus 1 Tablet(s) Oral two times a day with meals  lidocaine   4% Patch 2 Patch Transdermal <User Schedule>  lidocaine   4% Patch 1 Patch Transdermal <User Schedule>  loperamide 2 milliGRAM(s) Oral three times a day PRN  melatonin 6 milliGRAM(s) Oral at bedtime  methylPREDNISolone 28 milliGRAM(s) Oral daily  metoprolol tartrate 12.5 milliGRAM(s) Oral two times a day  multivitamin 1 Tablet(s) Oral daily  nystatin Powder 1 Application(s) Topical two times a day  pantoprazole    Tablet 40 milliGRAM(s) Oral before breakfast  polyethylene glycol 3350 17 Gram(s) Oral <User Schedule>  SIMETHICONE SOFTGELS 250 milliGRAM(s),SIMETHICONE SOFTGELS 250 milliGRAM(s) 250 milliGRAM(s) Oral three times a day PRN  sodium chloride 0.65% Nasal 1 Spray(s) Both Nostrils every 8 hours PRN  sodium chloride 0.9% Bolus 1000 milliLiter(s) IV Bolus once  sodium chloride 0.9% lock flush 5 milliLiter(s) IV Push two times a day  sodium chloride 0.9%. 1000 milliLiter(s) IV Continuous <Continuous>  zinc oxide 40% Paste 1 Application(s) Topical three times a day      T(C): 37.2 (12-16-23 @ 09:17), Max: 37.2 (12-16-23 @ 09:17)  HR: 82 (12-16-23 @ 09:17) (82 - 84)  BP: 105/68 (12-16-23 @ 09:17) (105/68 - 105/73)  RR: 16 (12-16-23 @ 09:17) (16 - 16)  SpO2: 97% (12-16-23 @ 09:17) (97% - 97%)    In NAD  HEENT- EOMI  Heart- S1S2  Lungs- CTA bl.  Abd- + BS, NT  Ext- No calf pain  Neuro- Exam unchanged    CBC 12/14/23 reviewed by me  CMP 12/14/23 reviewed by me  Lactate 12/14/23 and 12/11/23 reviewed by me        Imp: Patient with diagnosis of ILD and suspected CIM admitted for comprehensive acute rehabilitation.    Plan:  - Continue PT/OT/SLP as indicated  - DVT prophylaxis - Continue Eliquis  - Skin- Turn q2h, check skin daily  - Continue current medications  -Active issues-   Elevated lactate - downtrending but likely 2/2 dehydration, patient to increase PO hydration  ILD - continue steroid taper  - Patient is stable to continue current rehabilitation program.

## 2023-12-17 PROCEDURE — 99232 SBSQ HOSP IP/OBS MODERATE 35: CPT

## 2023-12-17 RX ADMIN — GABAPENTIN 300 MILLIGRAM(S): 400 CAPSULE ORAL at 09:31

## 2023-12-17 RX ADMIN — Medication 1 TABLET(S): at 20:46

## 2023-12-17 RX ADMIN — NYSTATIN CREAM 1 APPLICATION(S): 100000 CREAM TOPICAL at 09:06

## 2023-12-17 RX ADMIN — Medication 28 MILLIGRAM(S): at 09:32

## 2023-12-17 RX ADMIN — ZINC OXIDE 1 APPLICATION(S): 200 OINTMENT TOPICAL at 21:07

## 2023-12-17 RX ADMIN — Medication 500 MILLIGRAM(S): at 20:46

## 2023-12-17 RX ADMIN — GABAPENTIN 300 MILLIGRAM(S): 400 CAPSULE ORAL at 16:09

## 2023-12-17 RX ADMIN — ATOVAQUONE 1500 MILLIGRAM(S): 750 SUSPENSION ORAL at 20:47

## 2023-12-17 RX ADMIN — GABAPENTIN 300 MILLIGRAM(S): 400 CAPSULE ORAL at 21:01

## 2023-12-17 RX ADMIN — NYSTATIN CREAM 1 APPLICATION(S): 100000 CREAM TOPICAL at 20:46

## 2023-12-17 RX ADMIN — Medication 1 DROP(S): at 20:47

## 2023-12-17 RX ADMIN — APIXABAN 5 MILLIGRAM(S): 2.5 TABLET, FILM COATED ORAL at 09:32

## 2023-12-17 RX ADMIN — Medication 12.5 MILLIGRAM(S): at 20:45

## 2023-12-17 RX ADMIN — ZINC OXIDE 1 APPLICATION(S): 200 OINTMENT TOPICAL at 09:05

## 2023-12-17 RX ADMIN — Medication 1 TABLET(S): at 09:34

## 2023-12-17 RX ADMIN — ZINC OXIDE 1 APPLICATION(S): 200 OINTMENT TOPICAL at 16:25

## 2023-12-17 RX ADMIN — PANTOPRAZOLE SODIUM 40 MILLIGRAM(S): 20 TABLET, DELAYED RELEASE ORAL at 09:32

## 2023-12-17 RX ADMIN — APIXABAN 5 MILLIGRAM(S): 2.5 TABLET, FILM COATED ORAL at 20:45

## 2023-12-17 RX ADMIN — Medication 1 TABLET(S): at 20:45

## 2023-12-17 RX ADMIN — Medication 1 MILLIGRAM(S): at 20:46

## 2023-12-17 NOTE — PROGRESS NOTE ADULT - SUBJECTIVE AND OBJECTIVE BOX
Cc: Gait dysfunction    HPI: Patient seen and examined at bedside. No acute events overnight.   Pain controlled, no chest pain, no N/V, no Fevers/Chills. No other new ROS  Has been tolerating rehabilitation program.    acetaminophen     Tablet .. 650 milliGRAM(s) Oral every 6 hours PRN  albuterol/ipratropium for Nebulization 3 milliLiter(s) Nebulizer every 6 hours PRN  ALPRAZolam 0.25 milliGRAM(s) Oral every 6 hours PRN  aluminum hydroxide/magnesium hydroxide/simethicone Suspension 30 milliLiter(s) Oral every 4 hours PRN  ammonium lactate 12% Lotion 1 Application(s) Topical every 12 hours PRN  apixaban 5 milliGRAM(s) Oral every 12 hours  artificial  tears Solution 1 Drop(s) Both EYES every 12 hours  ascorbic acid 500 milliGRAM(s) Oral daily  atovaquone  Suspension 1500 milliGRAM(s) Oral daily  baclofen 5 milliGRAM(s) Oral every 8 hours PRN  benzocaine/menthol Lozenge 1 Lozenge Oral two times a day PRN  benzonatate 100 milliGRAM(s) Oral three times a day PRN  bisacodyl 5 milliGRAM(s) Oral daily PRN  dextrose 5%. 1000 milliLiter(s) IV Continuous <Continuous>  dextrose 5%. 1000 milliLiter(s) IV Continuous <Continuous>  dextrose 50% Injectable 12.5 Gram(s) IV Push once  dextrose 50% Injectable 25 Gram(s) IV Push once  dextrose 50% Injectable 25 Gram(s) IV Push once  dextrose Oral Gel 15 Gram(s) Oral once PRN  folic acid 1 milliGRAM(s) Oral daily  gabapentin 300 milliGRAM(s) Oral three times a day  glucagon  Injectable 1 milliGRAM(s) IntraMuscular once  hydrocortisone hemorrhoidal Suppository 1 Suppository(s) Rectal two times a day PRN  lactobacillus acidophilus 1 Tablet(s) Oral two times a day with meals  lidocaine   4% Patch 2 Patch Transdermal <User Schedule>  lidocaine   4% Patch 1 Patch Transdermal <User Schedule>  loperamide 2 milliGRAM(s) Oral three times a day PRN  melatonin 6 milliGRAM(s) Oral at bedtime  methylPREDNISolone 28 milliGRAM(s) Oral daily  metoprolol tartrate 12.5 milliGRAM(s) Oral two times a day  multivitamin 1 Tablet(s) Oral daily  nystatin Powder 1 Application(s) Topical two times a day  pantoprazole    Tablet 40 milliGRAM(s) Oral before breakfast  polyethylene glycol 3350 17 Gram(s) Oral <User Schedule>  SIMETHICONE SOFTGELS 250 milliGRAM(s),SIMETHICONE SOFTGELS 250 milliGRAM(s) 250 milliGRAM(s) Oral three times a day PRN  sodium chloride 0.65% Nasal 1 Spray(s) Both Nostrils every 8 hours PRN  sodium chloride 0.9% Bolus 1000 milliLiter(s) IV Bolus once  sodium chloride 0.9% lock flush 5 milliLiter(s) IV Push two times a day  sodium chloride 0.9%. 1000 milliLiter(s) IV Continuous <Continuous>  zinc oxide 40% Paste 1 Application(s) Topical three times a day      T(C): 36.8 (12-17-23 @ 09:36), Max: 36.8 (12-16-23 @ 19:16)  HR: 83 (12-17-23 @ 09:36) (83 - 88)  BP: 109/74 (12-17-23 @ 09:36) (109/74 - 111/79)  RR: 16 (12-17-23 @ 09:36) (16 - 16)  SpO2: 98% (12-17-23 @ 09:36) (98% - 98%)    CXR 12/6/23 reviewed and intrepreted by me: shallow inspiration and low lung volumes seen, no evidence of pneumonia    ACC: [id number] EXAM: XR CHEST AP OR PA 1V ORDERED BY: MARY URRUTIA    PROCEDURE DATE: 12/06/2023        INTERPRETATION: An AP portable supine radiograph the chest was performed for fever.    Comparison is made to 11/6/2023.    There is suggestion of subsegmental atelectasis in the right lower lobe, with right sided elevation of the right hemidiaphragm when compared to the left side, similar to the previous exam. No infiltrates are seen, when accounting for the low lung volumes and shallow inspiration. There is no pneumothorax. There are no pleural effusions. There is no distinct hilar or mediastinal lesion. The cardiac silhouette is likely magnified by technique. There is no CHF. There are degenerative changes of the spine.    IMPRESSION:  1. Elevated right hemidiaphragm with adjacent subsegmental atelectasis is similar to the previous study.  2. No evidence of pneumonia, pleural effusion or CHF.  3. In the setting of a shallow inspiratory effort with low lung volumes, evaluation of the pulmonary interstitium is limited.    --- End of Report ---    In NAD  HEENT- EOMI  Heart- S1S2  Lungs- CTA bl.  Abd- + BS, NT  Ext- No calf pain  Neuro- Exam unchanged        Imp: Patient with diagnosis of ILD and suspected CIM admitted for comprehensive acute rehabilitation.    Plan:  - Continue PT/OT/SLP as indicated  - DVT prophylaxis - Continue Eliquis  - Skin- Turn q2h, check skin daily  - Continue current medications  -Active issues-   Elevated lactate - downtrending but likely 2/2 dehydration, patient to increase PO hydration, no signs of symptoms of infection  ILD - continue steroid taper  - Patient is stable to continue current rehabilitation program.           JOLYNN DUMAS MD; Attending Radiologist  This document has been electronically signed. Dec 7 2023 2:32PM       Cc: Gait dysfunction    HPI: Patient seen and examined at bedside. No acute events overnight.   Pain controlled, no chest pain, no N/V, no Fevers/Chills. No other new ROS  Has been tolerating rehabilitation program.    acetaminophen     Tablet .. 650 milliGRAM(s) Oral every 6 hours PRN  albuterol/ipratropium for Nebulization 3 milliLiter(s) Nebulizer every 6 hours PRN  ALPRAZolam 0.25 milliGRAM(s) Oral every 6 hours PRN  aluminum hydroxide/magnesium hydroxide/simethicone Suspension 30 milliLiter(s) Oral every 4 hours PRN  ammonium lactate 12% Lotion 1 Application(s) Topical every 12 hours PRN  apixaban 5 milliGRAM(s) Oral every 12 hours  artificial  tears Solution 1 Drop(s) Both EYES every 12 hours  ascorbic acid 500 milliGRAM(s) Oral daily  atovaquone  Suspension 1500 milliGRAM(s) Oral daily  baclofen 5 milliGRAM(s) Oral every 8 hours PRN  benzocaine/menthol Lozenge 1 Lozenge Oral two times a day PRN  benzonatate 100 milliGRAM(s) Oral three times a day PRN  bisacodyl 5 milliGRAM(s) Oral daily PRN  dextrose 5%. 1000 milliLiter(s) IV Continuous <Continuous>  dextrose 5%. 1000 milliLiter(s) IV Continuous <Continuous>  dextrose 50% Injectable 12.5 Gram(s) IV Push once  dextrose 50% Injectable 25 Gram(s) IV Push once  dextrose 50% Injectable 25 Gram(s) IV Push once  dextrose Oral Gel 15 Gram(s) Oral once PRN  folic acid 1 milliGRAM(s) Oral daily  gabapentin 300 milliGRAM(s) Oral three times a day  glucagon  Injectable 1 milliGRAM(s) IntraMuscular once  hydrocortisone hemorrhoidal Suppository 1 Suppository(s) Rectal two times a day PRN  lactobacillus acidophilus 1 Tablet(s) Oral two times a day with meals  lidocaine   4% Patch 2 Patch Transdermal <User Schedule>  lidocaine   4% Patch 1 Patch Transdermal <User Schedule>  loperamide 2 milliGRAM(s) Oral three times a day PRN  melatonin 6 milliGRAM(s) Oral at bedtime  methylPREDNISolone 28 milliGRAM(s) Oral daily  metoprolol tartrate 12.5 milliGRAM(s) Oral two times a day  multivitamin 1 Tablet(s) Oral daily  nystatin Powder 1 Application(s) Topical two times a day  pantoprazole    Tablet 40 milliGRAM(s) Oral before breakfast  polyethylene glycol 3350 17 Gram(s) Oral <User Schedule>  SIMETHICONE SOFTGELS 250 milliGRAM(s),SIMETHICONE SOFTGELS 250 milliGRAM(s) 250 milliGRAM(s) Oral three times a day PRN  sodium chloride 0.65% Nasal 1 Spray(s) Both Nostrils every 8 hours PRN  sodium chloride 0.9% Bolus 1000 milliLiter(s) IV Bolus once  sodium chloride 0.9% lock flush 5 milliLiter(s) IV Push two times a day  sodium chloride 0.9%. 1000 milliLiter(s) IV Continuous <Continuous>  zinc oxide 40% Paste 1 Application(s) Topical three times a day      T(C): 36.8 (12-17-23 @ 09:36), Max: 36.8 (12-16-23 @ 19:16)  HR: 83 (12-17-23 @ 09:36) (83 - 88)  BP: 109/74 (12-17-23 @ 09:36) (109/74 - 111/79)  RR: 16 (12-17-23 @ 09:36) (16 - 16)  SpO2: 98% (12-17-23 @ 09:36) (98% - 98%)    CXR 12/6/23 reviewed and intrepreted by me: shallow inspiration and low lung volumes seen, no evidence of pneumonia    ACC: [id number] EXAM: XR CHEST AP OR PA 1V ORDERED BY: MARY URRUTIA    PROCEDURE DATE: 12/06/2023        INTERPRETATION: An AP portable supine radiograph the chest was performed for fever.    Comparison is made to 11/6/2023.    There is suggestion of subsegmental atelectasis in the right lower lobe, with right sided elevation of the right hemidiaphragm when compared to the left side, similar to the previous exam. No infiltrates are seen, when accounting for the low lung volumes and shallow inspiration. There is no pneumothorax. There are no pleural effusions. There is no distinct hilar or mediastinal lesion. The cardiac silhouette is likely magnified by technique. There is no CHF. There are degenerative changes of the spine.    IMPRESSION:  1. Elevated right hemidiaphragm with adjacent subsegmental atelectasis is similar to the previous study.  2. No evidence of pneumonia, pleural effusion or CHF.  3. In the setting of a shallow inspiratory effort with low lung volumes, evaluation of the pulmonary interstitium is limited.    --- End of Report ---    In NAD  HEENT- EOMI  Heart- S1S2  Lungs- CTA bl.  Abd- + BS, NT  Ext- No calf pain  Neuro- Exam unchanged        Imp: Patient with diagnosis of ILD and suspected CIM admitted for comprehensive acute rehabilitation.    Plan:  - Continue PT/OT/SLP as indicated  - DVT prophylaxis - Continue Eliquis  - Skin- Turn q2h, check skin daily  - Continue current medications  -Active issues-   Elevated lactate - downtrending but likely 2/2 dehydration, patient to increase PO hydration, no signs of symptoms of infection  ILD - continue steroid taper  - Patient is stable to continue current rehabilitation program.           JOLYNN DUMAS MD; Attending Radiologist  This document has been electronically signed. Dec 7 2023 2:32PM

## 2023-12-18 LAB
ALBUMIN SERPL ELPH-MCNC: 2.8 G/DL — LOW (ref 3.3–5)
ALBUMIN SERPL ELPH-MCNC: 2.8 G/DL — LOW (ref 3.3–5)
ALP SERPL-CCNC: 103 U/L — SIGNIFICANT CHANGE UP (ref 40–120)
ALP SERPL-CCNC: 103 U/L — SIGNIFICANT CHANGE UP (ref 40–120)
ALT FLD-CCNC: 48 U/L — HIGH (ref 10–45)
ALT FLD-CCNC: 48 U/L — HIGH (ref 10–45)
ANION GAP SERPL CALC-SCNC: 8 MMOL/L — SIGNIFICANT CHANGE UP (ref 5–17)
ANION GAP SERPL CALC-SCNC: 8 MMOL/L — SIGNIFICANT CHANGE UP (ref 5–17)
AST SERPL-CCNC: 21 U/L — SIGNIFICANT CHANGE UP (ref 10–40)
AST SERPL-CCNC: 21 U/L — SIGNIFICANT CHANGE UP (ref 10–40)
BASOPHILS # BLD AUTO: 0.03 K/UL — SIGNIFICANT CHANGE UP (ref 0–0.2)
BASOPHILS # BLD AUTO: 0.03 K/UL — SIGNIFICANT CHANGE UP (ref 0–0.2)
BASOPHILS NFR BLD AUTO: 0.4 % — SIGNIFICANT CHANGE UP (ref 0–2)
BASOPHILS NFR BLD AUTO: 0.4 % — SIGNIFICANT CHANGE UP (ref 0–2)
BILIRUB SERPL-MCNC: 0.6 MG/DL — SIGNIFICANT CHANGE UP (ref 0.2–1.2)
BILIRUB SERPL-MCNC: 0.6 MG/DL — SIGNIFICANT CHANGE UP (ref 0.2–1.2)
BUN SERPL-MCNC: 17 MG/DL — SIGNIFICANT CHANGE UP (ref 7–23)
BUN SERPL-MCNC: 17 MG/DL — SIGNIFICANT CHANGE UP (ref 7–23)
C3 SERPL-MCNC: 128 MG/DL
C4 SERPL-MCNC: 24 MG/DL
CALCIUM SERPL-MCNC: 9.3 MG/DL — SIGNIFICANT CHANGE UP (ref 8.4–10.5)
CALCIUM SERPL-MCNC: 9.3 MG/DL — SIGNIFICANT CHANGE UP (ref 8.4–10.5)
CHLORIDE SERPL-SCNC: 105 MMOL/L — SIGNIFICANT CHANGE UP (ref 96–108)
CHLORIDE SERPL-SCNC: 105 MMOL/L — SIGNIFICANT CHANGE UP (ref 96–108)
CO2 SERPL-SCNC: 28 MMOL/L — SIGNIFICANT CHANGE UP (ref 22–31)
CO2 SERPL-SCNC: 28 MMOL/L — SIGNIFICANT CHANGE UP (ref 22–31)
CREAT SERPL-MCNC: 0.56 MG/DL — SIGNIFICANT CHANGE UP (ref 0.5–1.3)
CREAT SERPL-MCNC: 0.56 MG/DL — SIGNIFICANT CHANGE UP (ref 0.5–1.3)
CULTURE RESULTS: SIGNIFICANT CHANGE UP
CULTURE RESULTS: SIGNIFICANT CHANGE UP
EGFR: 101 ML/MIN/1.73M2 — SIGNIFICANT CHANGE UP
EGFR: 101 ML/MIN/1.73M2 — SIGNIFICANT CHANGE UP
EOSINOPHIL # BLD AUTO: 0.01 K/UL — SIGNIFICANT CHANGE UP (ref 0–0.5)
EOSINOPHIL # BLD AUTO: 0.01 K/UL — SIGNIFICANT CHANGE UP (ref 0–0.5)
EOSINOPHIL NFR BLD AUTO: 0.1 % — SIGNIFICANT CHANGE UP (ref 0–6)
EOSINOPHIL NFR BLD AUTO: 0.1 % — SIGNIFICANT CHANGE UP (ref 0–6)
GLUCOSE SERPL-MCNC: 121 MG/DL — HIGH (ref 70–99)
GLUCOSE SERPL-MCNC: 121 MG/DL — HIGH (ref 70–99)
HCT VFR BLD CALC: 34.2 % — LOW (ref 34.5–45)
HCT VFR BLD CALC: 34.2 % — LOW (ref 34.5–45)
HGB BLD-MCNC: 10.3 G/DL — LOW (ref 11.5–15.5)
HGB BLD-MCNC: 10.3 G/DL — LOW (ref 11.5–15.5)
IMM GRANULOCYTES NFR BLD AUTO: 0.7 % — SIGNIFICANT CHANGE UP (ref 0–0.9)
IMM GRANULOCYTES NFR BLD AUTO: 0.7 % — SIGNIFICANT CHANGE UP (ref 0–0.9)
LACTATE SERPL-SCNC: 2.5 MMOL/L — HIGH (ref 0.7–2)
LACTATE SERPL-SCNC: 2.5 MMOL/L — HIGH (ref 0.7–2)
LYMPHOCYTES # BLD AUTO: 1.32 K/UL — SIGNIFICANT CHANGE UP (ref 1–3.3)
LYMPHOCYTES # BLD AUTO: 1.32 K/UL — SIGNIFICANT CHANGE UP (ref 1–3.3)
LYMPHOCYTES # BLD AUTO: 17.7 % — SIGNIFICANT CHANGE UP (ref 13–44)
LYMPHOCYTES # BLD AUTO: 17.7 % — SIGNIFICANT CHANGE UP (ref 13–44)
MCHC RBC-ENTMCNC: 25.6 PG — LOW (ref 27–34)
MCHC RBC-ENTMCNC: 25.6 PG — LOW (ref 27–34)
MCHC RBC-ENTMCNC: 30.1 GM/DL — LOW (ref 32–36)
MCHC RBC-ENTMCNC: 30.1 GM/DL — LOW (ref 32–36)
MCV RBC AUTO: 84.9 FL — SIGNIFICANT CHANGE UP (ref 80–100)
MCV RBC AUTO: 84.9 FL — SIGNIFICANT CHANGE UP (ref 80–100)
MONOCYTES # BLD AUTO: 0.64 K/UL — SIGNIFICANT CHANGE UP (ref 0–0.9)
MONOCYTES # BLD AUTO: 0.64 K/UL — SIGNIFICANT CHANGE UP (ref 0–0.9)
MONOCYTES NFR BLD AUTO: 8.6 % — SIGNIFICANT CHANGE UP (ref 2–14)
MONOCYTES NFR BLD AUTO: 8.6 % — SIGNIFICANT CHANGE UP (ref 2–14)
NEUTROPHILS # BLD AUTO: 5.41 K/UL — SIGNIFICANT CHANGE UP (ref 1.8–7.4)
NEUTROPHILS # BLD AUTO: 5.41 K/UL — SIGNIFICANT CHANGE UP (ref 1.8–7.4)
NEUTROPHILS NFR BLD AUTO: 72.5 % — SIGNIFICANT CHANGE UP (ref 43–77)
NEUTROPHILS NFR BLD AUTO: 72.5 % — SIGNIFICANT CHANGE UP (ref 43–77)
NRBC # BLD: 0 /100 WBCS — SIGNIFICANT CHANGE UP (ref 0–0)
NRBC # BLD: 0 /100 WBCS — SIGNIFICANT CHANGE UP (ref 0–0)
PLATELET # BLD AUTO: 306 K/UL — SIGNIFICANT CHANGE UP (ref 150–400)
PLATELET # BLD AUTO: 306 K/UL — SIGNIFICANT CHANGE UP (ref 150–400)
POTASSIUM SERPL-MCNC: 4.2 MMOL/L — SIGNIFICANT CHANGE UP (ref 3.5–5.3)
POTASSIUM SERPL-MCNC: 4.2 MMOL/L — SIGNIFICANT CHANGE UP (ref 3.5–5.3)
POTASSIUM SERPL-SCNC: 4.2 MMOL/L — SIGNIFICANT CHANGE UP (ref 3.5–5.3)
POTASSIUM SERPL-SCNC: 4.2 MMOL/L — SIGNIFICANT CHANGE UP (ref 3.5–5.3)
PROT SERPL-MCNC: 5.5 G/DL — LOW (ref 6–8.3)
PROT SERPL-MCNC: 5.5 G/DL — LOW (ref 6–8.3)
RBC # BLD: 4.03 M/UL — SIGNIFICANT CHANGE UP (ref 3.8–5.2)
RBC # BLD: 4.03 M/UL — SIGNIFICANT CHANGE UP (ref 3.8–5.2)
RBC # FLD: 15.9 % — HIGH (ref 10.3–14.5)
RBC # FLD: 15.9 % — HIGH (ref 10.3–14.5)
SODIUM SERPL-SCNC: 141 MMOL/L — SIGNIFICANT CHANGE UP (ref 135–145)
SODIUM SERPL-SCNC: 141 MMOL/L — SIGNIFICANT CHANGE UP (ref 135–145)
SPECIMEN SOURCE: SIGNIFICANT CHANGE UP
SPECIMEN SOURCE: SIGNIFICANT CHANGE UP
WBC # BLD: 7.46 K/UL — SIGNIFICANT CHANGE UP (ref 3.8–10.5)
WBC # BLD: 7.46 K/UL — SIGNIFICANT CHANGE UP (ref 3.8–10.5)
WBC # FLD AUTO: 7.46 K/UL — SIGNIFICANT CHANGE UP (ref 3.8–10.5)
WBC # FLD AUTO: 7.46 K/UL — SIGNIFICANT CHANGE UP (ref 3.8–10.5)

## 2023-12-18 PROCEDURE — 99232 SBSQ HOSP IP/OBS MODERATE 35: CPT

## 2023-12-18 RX ORDER — BENZOCAINE AND MENTHOL 5; 1 G/100ML; G/100ML
1 LIQUID ORAL
Qty: 0 | Refills: 0 | DISCHARGE
Start: 2023-12-18

## 2023-12-18 RX ORDER — SOD,AMMONIUM,POTASSIUM LACTATE
1 CREAM (GRAM) TOPICAL
Qty: 0 | Refills: 0 | DISCHARGE
Start: 2023-12-18

## 2023-12-18 RX ORDER — PANTOPRAZOLE SODIUM 20 MG/1
1 TABLET, DELAYED RELEASE ORAL
Qty: 30 | Refills: 0
Start: 2023-12-18 | End: 2024-01-16

## 2023-12-18 RX ORDER — ATOVAQUONE 750 MG/5ML
10 SUSPENSION ORAL
Qty: 300 | Refills: 0
Start: 2023-12-18 | End: 2024-01-16

## 2023-12-18 RX ORDER — NYSTATIN CREAM 100000 [USP'U]/G
1 CREAM TOPICAL
Qty: 2 | Refills: 0
Start: 2023-12-18 | End: 2024-01-16

## 2023-12-18 RX ORDER — IPRATROPIUM BROMIDE 0.2 MG/ML
2 SOLUTION, NON-ORAL INHALATION
Qty: 3 | Refills: 0
Start: 2023-12-18 | End: 2024-01-16

## 2023-12-18 RX ORDER — ALPRAZOLAM 0.25 MG
1 TABLET ORAL
Qty: 120 | Refills: 0
Start: 2023-12-18 | End: 2024-01-16

## 2023-12-18 RX ORDER — GABAPENTIN 400 MG/1
1 CAPSULE ORAL
Qty: 90 | Refills: 0
Start: 2023-12-18 | End: 2024-01-16

## 2023-12-18 RX ORDER — HYDROCORTISONE 1 %
1 OINTMENT (GRAM) TOPICAL
Qty: 60 | Refills: 0
Start: 2023-12-18 | End: 2024-01-16

## 2023-12-18 RX ORDER — METOPROLOL TARTRATE 50 MG
0.5 TABLET ORAL
Qty: 30 | Refills: 0
Start: 2023-12-18 | End: 2024-01-16

## 2023-12-18 RX ORDER — APIXABAN 2.5 MG/1
1 TABLET, FILM COATED ORAL
Qty: 60 | Refills: 0
Start: 2023-12-18 | End: 2024-01-16

## 2023-12-18 RX ORDER — ACETAMINOPHEN 500 MG
2 TABLET ORAL
Qty: 0 | Refills: 0 | DISCHARGE
Start: 2023-12-18

## 2023-12-18 RX ORDER — LIDOCAINE 4 G/100G
3 CREAM TOPICAL
Qty: 0 | Refills: 0 | DISCHARGE
Start: 2023-12-18

## 2023-12-18 RX ORDER — ALPRAZOLAM 0.25 MG
0.25 TABLET ORAL EVERY 6 HOURS
Refills: 0 | Status: DISCONTINUED | OUTPATIENT
Start: 2023-12-18 | End: 2023-12-20

## 2023-12-18 RX ADMIN — Medication 1 MILLIGRAM(S): at 20:44

## 2023-12-18 RX ADMIN — Medication 28 MILLIGRAM(S): at 08:53

## 2023-12-18 RX ADMIN — GABAPENTIN 300 MILLIGRAM(S): 400 CAPSULE ORAL at 20:45

## 2023-12-18 RX ADMIN — ZINC OXIDE 1 APPLICATION(S): 200 OINTMENT TOPICAL at 21:53

## 2023-12-18 RX ADMIN — NYSTATIN CREAM 1 APPLICATION(S): 100000 CREAM TOPICAL at 20:43

## 2023-12-18 RX ADMIN — NYSTATIN CREAM 1 APPLICATION(S): 100000 CREAM TOPICAL at 08:54

## 2023-12-18 RX ADMIN — PANTOPRAZOLE SODIUM 40 MILLIGRAM(S): 20 TABLET, DELAYED RELEASE ORAL at 08:52

## 2023-12-18 RX ADMIN — APIXABAN 5 MILLIGRAM(S): 2.5 TABLET, FILM COATED ORAL at 08:52

## 2023-12-18 RX ADMIN — ZINC OXIDE 1 APPLICATION(S): 200 OINTMENT TOPICAL at 13:10

## 2023-12-18 RX ADMIN — LIDOCAINE 1 PATCH: 4 CREAM TOPICAL at 08:53

## 2023-12-18 RX ADMIN — APIXABAN 5 MILLIGRAM(S): 2.5 TABLET, FILM COATED ORAL at 20:44

## 2023-12-18 RX ADMIN — Medication 500 MILLIGRAM(S): at 20:44

## 2023-12-18 RX ADMIN — ZINC OXIDE 1 APPLICATION(S): 200 OINTMENT TOPICAL at 08:54

## 2023-12-18 RX ADMIN — Medication 1 TABLET(S): at 20:45

## 2023-12-18 RX ADMIN — Medication 12.5 MILLIGRAM(S): at 20:43

## 2023-12-18 RX ADMIN — ATOVAQUONE 1500 MILLIGRAM(S): 750 SUSPENSION ORAL at 20:43

## 2023-12-18 RX ADMIN — LIDOCAINE 2 PATCH: 4 CREAM TOPICAL at 08:53

## 2023-12-18 RX ADMIN — Medication 5 MILLIGRAM(S): at 16:46

## 2023-12-18 RX ADMIN — GABAPENTIN 300 MILLIGRAM(S): 400 CAPSULE ORAL at 16:13

## 2023-12-18 RX ADMIN — Medication 1 TABLET(S): at 09:03

## 2023-12-18 RX ADMIN — Medication 1 TABLET(S): at 20:44

## 2023-12-18 RX ADMIN — GABAPENTIN 300 MILLIGRAM(S): 400 CAPSULE ORAL at 08:52

## 2023-12-18 NOTE — PROGRESS NOTE ADULT - SUBJECTIVE AND OBJECTIVE BOX
Patient is a 63y old  Female who presents with a chief complaint of Interstitial Lung Disease (18 Dec 2023 11:10)    Patient seen and examined at bedside. No acute overnight events. Denies pain.   Reviewed previous UA results with patient     ALLERGIES:  No Known Allergies    MEDICATIONS  (STANDING):  apixaban 5 milliGRAM(s) Oral every 12 hours  artificial  tears Solution 1 Drop(s) Both EYES every 12 hours  ascorbic acid 500 milliGRAM(s) Oral daily  atovaquone  Suspension 1500 milliGRAM(s) Oral daily  dextrose 5%. 1000 milliLiter(s) (100 mL/Hr) IV Continuous <Continuous>  dextrose 5%. 1000 milliLiter(s) (50 mL/Hr) IV Continuous <Continuous>  dextrose 50% Injectable 12.5 Gram(s) IV Push once  dextrose 50% Injectable 25 Gram(s) IV Push once  dextrose 50% Injectable 25 Gram(s) IV Push once  folic acid 1 milliGRAM(s) Oral daily  gabapentin 300 milliGRAM(s) Oral three times a day  glucagon  Injectable 1 milliGRAM(s) IntraMuscular once  lactobacillus acidophilus 1 Tablet(s) Oral two times a day with meals  lidocaine   4% Patch 2 Patch Transdermal <User Schedule>  lidocaine   4% Patch 1 Patch Transdermal <User Schedule>  melatonin 6 milliGRAM(s) Oral at bedtime  methylPREDNISolone 28 milliGRAM(s) Oral daily  metoprolol tartrate 12.5 milliGRAM(s) Oral two times a day  multivitamin 1 Tablet(s) Oral daily  nystatin Powder 1 Application(s) Topical two times a day  pantoprazole    Tablet 40 milliGRAM(s) Oral before breakfast  polyethylene glycol 3350 17 Gram(s) Oral <User Schedule>  sodium chloride 0.9% Bolus 1000 milliLiter(s) IV Bolus once  sodium chloride 0.9% lock flush 5 milliLiter(s) IV Push two times a day  sodium chloride 0.9%. 1000 milliLiter(s) (100 mL/Hr) IV Continuous <Continuous>  zinc oxide 40% Paste 1 Application(s) Topical three times a day    MEDICATIONS  (PRN):  acetaminophen     Tablet .. 650 milliGRAM(s) Oral every 6 hours PRN Temp greater or equal to 38C (100.4F), Mild Pain (1 - 3), Moderate Pain (4 - 6)  albuterol/ipratropium for Nebulization 3 milliLiter(s) Nebulizer every 6 hours PRN Shortness of Breath and/or Wheezing  ALPRAZolam 0.25 milliGRAM(s) Oral every 6 hours PRN Anxiety  aluminum hydroxide/magnesium hydroxide/simethicone Suspension 30 milliLiter(s) Oral every 4 hours PRN Dyspepsia  ammonium lactate 12% Lotion 1 Application(s) Topical every 12 hours PRN BL feet dryness  baclofen 5 milliGRAM(s) Oral every 8 hours PRN Musculoskeletal Pain  benzocaine/menthol Lozenge 1 Lozenge Oral two times a day PRN Sore Throat  benzonatate 100 milliGRAM(s) Oral three times a day PRN Cough  bisacodyl 5 milliGRAM(s) Oral daily PRN Constipation  dextrose Oral Gel 15 Gram(s) Oral once PRN Blood Glucose LESS THAN 70 milliGRAM(s)/deciliter  hydrocortisone hemorrhoidal Suppository 1 Suppository(s) Rectal two times a day PRN Hemorrhoids  loperamide 2 milliGRAM(s) Oral three times a day PRN Diarrhea  SIMETHICONE SOFTGELS 250 milliGRAM(s),SIMETHICONE SOFTGELS 250 milliGRAM(s) 250 milliGRAM(s) Oral three times a day PRN for gas  sodium chloride 0.65% Nasal 1 Spray(s) Both Nostrils every 8 hours PRN Nasal Congestion    Vital Signs Last 24 Hrs  T(F): 98 (18 Dec 2023 08:45), Max: 98.9 (17 Dec 2023 20:30)  HR: 79 (18 Dec 2023 08:45) (79 - 84)  BP: 96/65 (18 Dec 2023 08:45) (96/65 - 110/72)  RR: 16 (18 Dec 2023 08:45) (16 - 16)  SpO2: 93% (18 Dec 2023 08:45) (93% - 94%)  I&O's Summary    PHYSICAL EXAM:  General: NAD, awake, alert   ENT: MMM, no tonsilar exudate  Neck: Supple, No JVD  Lungs: Clear to auscultation bilaterally, no wheezes. Good air entry bilaterally   Cardio: RRR, S1/S2, No murmurs  Abdomen: Soft, Nontender, Nondistended; Bowel sounds present  Extremities: No calf tenderness, No pitting edema    LABS:                        10.3   7.46  )-----------( 306      ( 18 Dec 2023 08:40 )             34.2       12-18    141  |  105  |  17  ----------------------------<  121  4.2   |  28  |  0.56    Ca    9.3      18 Dec 2023 08:40    TPro  5.5  /  Alb  2.8  /  TBili  0.6  /  DBili  x   /  AST  21  /  ALT  48  /  AlkPhos  103  12-18     Lactate, Blood: 2.5 mmol/L (12-18 @ 08:40)    09-25 Chol -- LDL -- HDL -- Trig 199 mg/dL    Urinalysis Basic - ( 18 Dec 2023 08:40 )    Color: x / Appearance: x / SG: x / pH: x  Gluc: 121 mg/dL / Ketone: x  / Bili: x / Urobili: x   Blood: x / Protein: x / Nitrite: x   Leuk Esterase: x / RBC: x / WBC x   Sq Epi: x / Non Sq Epi: x / Bacteria: x    Culture - Urine (collected 15 Dec 2023 21:45)  Source: Clean Catch Clean Catch (Midstream)  Final Report (18 Dec 2023 09:02):    >=3 organisms. Probable collection contamination.    Culture - Blood (collected 15 Dec 2023 18:12)  Source: .Blood Blood-Peripheral  Preliminary Report (18 Dec 2023 03:01):    No growth at 48 Hours    Culture - Blood (collected 15 Dec 2023 18:05)  Source: .Blood Blood-Peripheral  Preliminary Report (18 Dec 2023 03:01):    No growth at 48 Hours    RADIOLOGY & ADDITIONAL TESTS:     Care Discussed with Consultants/Other Providers:

## 2023-12-18 NOTE — PROGRESS NOTE ADULT - SUBJECTIVE AND OBJECTIVE BOX
SUBJECTIVE/ROS:  Patient seen and examined observed in therapy, partner at bed side  She reports an uneventful week end  No acute urinary or GI symptoms  Requiring NC oxy, nightly for treatment of her JACEY  She asked for clarification on last coverage date for her acute rehab treatment by her health insurance (12/3 or 12/13) and we told her that SW team will clarify on this  She asked if planned DC date could be moved from 12/ 20 to 12/22, I told her that we are unsure of what the insurance will answer to this request, but we will liaise with SW to check on this  For now, we advised her continue plans with dc home date for 12/20    She asked if previously planned out patient CT chest could be brought forward prior to DC,last CT chest 12/5, but pulmonary team advised to continue with plan for imaging post dc, no need for repeat imaging this time      ROS--No chest or abd pain, no palpitations nausea or vomiting, LBM 12/17    Therapy  Sustained improvement noted  Ambulating >100FT  with walker, cga but still requiring assistance sit to stand due to hip extensor weakness  Selft ambulatory with manual WC    Vital Signs Last 24 Hrs  T(C): 36.7 (18 Dec 2023 08:45), Max: 37.2 (17 Dec 2023 20:30)  T(F): 98 (18 Dec 2023 08:45), Max: 98.9 (17 Dec 2023 20:30)  HR: 79 (18 Dec 2023 08:45) (79 - 84)  BP: 96/65 (18 Dec 2023 08:45) (96/65 - 110/72)  RR: 16 (18 Dec 2023 08:45) (16 - 16)  SpO2: 93% (18 Dec 2023 08:45) (93% - 94%)  O2 Parameters below as of 18 Dec 2023 08:45  Patient On (Oxygen Delivery Method): room air        PHYSICAL EXAM:  Gen -  Comfortable,  at bedside -normal oxy sat and subsequently self ambulating WC functional distance and with walker for increasing distance, NOW > 100FT  Pulm - clear  Cardiovascular - HS i and II intermittently irregular  Abdomen - Soft, non tender,   Extremities - edema to b/l LE, no calf tenderness, LUE Med line    Neuro-  Cognitive - awake, alert, fully oriented,  Light tough sensation diffusely reduced on fingers and dorsal foot.   Motor -                    LEFT    UE - 4/5                    RIGHT UE - 4/5                     LEFT    LE - 3+/5, distally and 3/5 proximal                    RIGHT LE - Ankles PF 3/5 ,DF 2/5, Knee ext 3/5 Hips limited by pain Rt hip due to ulcer at ECHO access site   Sensory - decreased light tough sensation fingers and sole of foot, difusely  MSK: improvement with motor strength  Psychiatric - Mood stable, Affect WNL  Skin -- , Pressure injury on b/l buttock is healing, no discharge  Wound on right pito, continues to improve, very limited surface area and shallow  MMT--Able to contract B/L upper back muscles, EF/EE 4/5 distally, knee Est 3+/5      RECENT LABS/IMAGING                        10.3   7.46  )-----------( 306      ( 18 Dec 2023 08:40 )             34.2     12-18    141  |  105  |  17  ----------------------------<  121<H>  4.2   |  28  |  0.56    Ca    9.3      18 Dec 2023 08:40    TPro  5.5<L>  /  Alb  2.8<L>  /  TBili  0.6  /  DBili  x   /  AST  21  /  ALT  48<H>  /  AlkPhos  103  12-18    Lactate down trending    Urinalysis Basic - ( 18 Dec 2023 08:40 )    Color: x / Appearance: x / SG: x / pH: x  Gluc: 121 mg/dL / Ketone: x  / Bili: x / Urobili: x   Blood: x / Protein: x / Nitrite: x   Leuk Esterase: x / RBC: x / WBC x   Sq Epi: x / Non Sq Epi: x / Bacteria: x    MEDICATIONS  (STANDING):  apixaban 5 milliGRAM(s) Oral every 12 hours  artificial  tears Solution 1 Drop(s) Both EYES every 12 hours  ascorbic acid 500 milliGRAM(s) Oral daily  atovaquone  Suspension 1500 milliGRAM(s) Oral daily  dextrose 5%. 1000 milliLiter(s) (100 mL/Hr) IV Continuous <Continuous>  dextrose 5%. 1000 milliLiter(s) (50 mL/Hr) IV Continuous <Continuous>  dextrose 50% Injectable 12.5 Gram(s) IV Push once  dextrose 50% Injectable 25 Gram(s) IV Push once  dextrose 50% Injectable 25 Gram(s) IV Push once  folic acid 1 milliGRAM(s) Oral daily  gabapentin 300 milliGRAM(s) Oral three times a day  glucagon  Injectable 1 milliGRAM(s) IntraMuscular once  lactobacillus acidophilus 1 Tablet(s) Oral two times a day with meals  lidocaine   4% Patch 1 Patch Transdermal <User Schedule>  lidocaine   4% Patch 2 Patch Transdermal <User Schedule>  melatonin 6 milliGRAM(s) Oral at bedtime  methylPREDNISolone 28 milliGRAM(s) Oral daily  metoprolol tartrate 12.5 milliGRAM(s) Oral two times a day  multivitamin 1 Tablet(s) Oral daily  nystatin Powder 1 Application(s) Topical two times a day  pantoprazole    Tablet 40 milliGRAM(s) Oral before breakfast  polyethylene glycol 3350 17 Gram(s) Oral <User Schedule>  sodium chloride 0.9% Bolus 1000 milliLiter(s) IV Bolus once  sodium chloride 0.9% lock flush 5 milliLiter(s) IV Push two times a day  sodium chloride 0.9%. 1000 milliLiter(s) (100 mL/Hr) IV Continuous <Continuous>  zinc oxide 40% Paste 1 Application(s) Topical three times a day    MEDICATIONS  (PRN):  acetaminophen     Tablet .. 650 milliGRAM(s) Oral every 6 hours PRN Temp greater or equal to 38C (100.4F), Mild Pain (1 - 3), Moderate Pain (4 - 6)  albuterol/ipratropium for Nebulization 3 milliLiter(s) Nebulizer every 6 hours PRN Shortness of Breath and/or Wheezing  ALPRAZolam 0.25 milliGRAM(s) Oral every 6 hours PRN Anxiety  aluminum hydroxide/magnesium hydroxide/simethicone Suspension 30 milliLiter(s) Oral every 4 hours PRN Dyspepsia  ammonium lactate 12% Lotion 1 Application(s) Topical every 12 hours PRN BL feet dryness  baclofen 5 milliGRAM(s) Oral every 8 hours PRN Musculoskeletal Pain  benzocaine/menthol Lozenge 1 Lozenge Oral two times a day PRN Sore Throat  benzonatate 100 milliGRAM(s) Oral three times a day PRN Cough  bisacodyl 5 milliGRAM(s) Oral daily PRN Constipation  dextrose Oral Gel 15 Gram(s) Oral once PRN Blood Glucose LESS THAN 70 milliGRAM(s)/deciliter  hydrocortisone hemorrhoidal Suppository 1 Suppository(s) Rectal two times a day PRN Hemorrhoids  loperamide 2 milliGRAM(s) Oral three times a day PRN Diarrhea  SIMETHICONE SOFTGELS 250 milliGRAM(s),SIMETHICONE SOFTGELS 250 milliGRAM(s) 250 milliGRAM(s) Oral three times a day PRN for gas  sodium chloride 0.65% Nasal 1 Spray(s) Both Nostrils every 8 hours PRN Nasal Congestion

## 2023-12-18 NOTE — PROVIDER CONTACT NOTE (CRITICAL VALUE NOTIFICATION) - ASSESSMENT
No complaints of chest pain, SOB. Patient states "I'm tired, didn't sleep well last night". Low grad temperature 100.4 oral, /65, HR 97, RR 16 93% on 2L NC
AxOx4, VSS

## 2023-12-18 NOTE — PROGRESS NOTE ADULT - ASSESSMENT
Assessment/Plan:  ALIZA FOLEY is a 62 year old female with PMH of pAFIB and sciatica; who presented to [facility] ED with SOB, and was found to have groundglass opacities and interstitial lung disease. She was treated with empiric Vancomycin and Zosyn. She underwent a bronchoscopy with bronchoalveolar lavage and pulse steroids. She was given IV diuretics for increased pulmonary congestion, but her respiratory status continued to worsen.  On 9/13, she was transferred to [facility] for ECMO requirements. She was further treated with Plasmapheresis, Rituximab and high-dose steroids, with significant improvement. Her overall hospital course was complicated by thrombocytopenia (s/p several platelet transfusions), leukocytosis (infectious workup entirely negative- s/p Vanco and Ceftriaxone), AFIB with RVR (resolved with Metoprolol), dysphagia (requiring NGT), transaminitis (secondary to acute illness and hypotension),  non-occlusive RIJ DVT (started on Lovenox). Patient now admitted for a multidisciplinary rehab program. 10-05-23 @ 13:18    * Sustained functional improvement--increasing ambulatory distance to 160 ft with RW, and functional distance with manual WC   * Therapy to focuson progressive ambulation and hip extensor strengthening   * Rheumatology labs will be ordered tomorrow and will f/u with Rheumat for results post dc  *  Labs unremarkable, lactate downtrending, no active infection  * Discussed issue with health insurance and  will f/u on this, working on dc home for 12/20    #Interstitial Lung Disease and Mixed connective tissue disease  - Gait Instability, ADL impairments and Functional impairments: start Comprehensive Rehab Program of PT/OT/SLP - 3 hours a day, 5 days a week  - P&O as needed   - Non-specific Interstitial Lung Disease  - Requiring ECMO   - Improvement s/p Plasmapheresis, Rituximab and high- dose steroids   - Albuterol/Ipratropium Nebulizer every 6 hours  - Mepron 1500mg daily  - PO Medrol ( due to liver dysfunction): Plan will be to taper by ~20% every 2 weeks    Medrol   64mg x 2 weeks   56mg x 2 weeks  44mg x 2 weeks   36mg x 2 weeks - Follow up Outpatient   - Plan to wean 02 as tolerated, Continue tapering oxygen,  -  CT Chest noted 11/10---Resp f/u review  11/14, appreciated  They reports sustained improvement, clinical (normal oxy sats on R/A, sustained progress with oxy tapering), and radiological (no acute findings on recent CT Chest, rather residual chronic infiltrative diesease noted, with recommendation to repeat CT after Acute rehab treatment   Rheumat rec Dr Vasquez 12/14  - Taper to Medrol 28mg x 2 weeks then 20mg x 2 weeks, then can taper by 4mg qweekly - 16mg, 12mg, 8mg. Remain on 4mg/day until next clinic visit   - continue with PCP ppx with Mepron and GI ppx with PPI while on steroids   - no further Rituxan needed at present time  - pt will be going to [place] shortly following hospital discharge - will have labs done prior to leaving, paper lab orders provided to her today, will arrange rheum f/u with Dr Escobar once back from [place] in late March so can facilitate next Rituxan dosing.   - Provide 1 month of medrol on discharge, my office will continue rx until she is seen by Dr Escobar. Pt has also been advised to find a rheumatologist where she will be in case of intermediate emergencies   --12/18-- Rheumatology labs will be ordered tomorrow and will f/u with Rheumat for results post dc    #Fever, resolved   - Recent UTI on Bactrim (since DCd)  - UCX with enterococcus faecalis  - 101.4 fever overnight (12/4-12/5)  - Lactate of 2.5- patient refused IV fluids   - Start Zosyn IVPB 12/6 (pt agreeable)  - RVP negative  - F/U blood cultures, CXR  - ID consult and Pulm re-eval (no respiratory symptoms)  - Pulm recs: continue to monitor   - ID recs: CT chest- stable (12/5)   - Repeat Lactate of 3.1 - agreeable to IV fluids- s/p 1000mL bolus   - BL LE doppler- negative   --UTI--treated   Lactate downtrending, no active infection, no need for continued monitoring     #Cankre sore--resolved    #Knee buckling--knee strength improving  - Back muscle strain with catching self on RW   - Lidocaine patch to back and BL deltoids    #Experiencing R mandible and occipital numbness, with associated hair loss- outpatient follow up with Rheumatology    #Posttnasal drip--ENT review 11/7, declined scope, continue flonase     #pAFIB/Rt Ij thrombus  - Metoprolol 25mg BID and lovenox  ---Eliquis 5mg BID - tolerating well   -- RUE venous duplex check for resolution of know DVT 12/11    #Chronic Tinnitus   - ENT review and recs appreciate, Patient already made ENT appointment for f/u    #Lymphedema both legs -chronic and Rt IJ thrombus  - ACE Wrap BL LEs  - BL LE doppler negative for DVT  - BL UE doppler with chronic thrombotic changes in RIJ     #Transaminitis --LFT Down trending   - Secondary to acute illness and hypotension at [facility]  - Outpatient follow up     #Leucocytosis--steroid related, continue monitoring   # Elevated Lactate--downtrending 3.6 on 12/14 will monitor, no signs of acute infection   Down trending, no need for further monitoring    #Neuropathy  - Gabapentin 300 mg BID, will consider increasing dose, increase to TID 11/10  --Work on motor strengthening, priority for UE as preferred by patient   - Vit b12 >2,000 in 9/2023  -- Rhuematology review 12/14 labs recommended, will order prior to dc and f/u with Rheumat    #Sleep/Mood  - Melatonin 6mg at HS  - Psych rec Xanax 0.25mg PRN    #Skin  - Wound care recs--dry dressing over tiny surface area wound Rt groin, no need for skilled nursing care post dc   --Bruise left lower shin, resolving  #Pain Mgmt   - Tylenol PRN   - Gabapentin 300mg at HS     #GI/Bowel Mgmt/Hx of alternating bowel movements  - Pantoprazole  - PRN: Maalox   --Lactobacillus BID     #/Bladder Mgmt   -Spontaneously voiding   - UA (11/3) negative  - +UTI- Amoxicillin  completed  - 12/14---Elevated lactate--f/u repeat level and other labs     #FEN   #Dysphagia  - Diet - Minced & Moist  [CC]    - Dysphagia- SLP evaluation appreciated     #Health maintenance  - Folic Acid   - MVI  - Ocean nasal spray    # Difficulty with access to peripheral veins-- Blood draws from Left arm Medline --RUE F/U venous duplex 12/11    #Precautions / PROPHYLAXIS:   - Falls  - ortho: Weight bearing status: WBAT   - Lungs: Aspiration, Incentive Spirometer   - DVT PPX: Eliquis 5 mg BID   ----------------------------------------  12/11- --Labs CBC mild anemia, normal renal function, LFT elevated, downtrending overall unremarkable   12/14--* elevated lactate downtrending     Health maintenance--await response from insurance company for the expedited appeal regarding acute rehab treatment   ----------------------------------------  Liaison with other providers/agencies  12/8--Discussed with Respiratory, no need for repeat CT chest this time, last imaging wead 12/5, Resp will f/u with patient post d/c      IDT conference on 12/5  Social Work: Awaiting insurance response on clinicals  SLP: -- n/a  OT: Setup for eating/grooming. min assistance for ADLs, mod assistance for toileting  Shower transfers mod assistance x 1  Independent with bed mobility  Ambulating via light gate 100--120ft   Amb 65 ft with bariatric RW and WCF with min A. 6 inch step mod A x2.   Contact guard for stand pivot transfers  Goal--min assist with ADLs  RT--Engaging for activity planning and relaxation exercises  DME: Bariatric RW, WC, drop arm commode  Barriers: New fevers  TDD: 12/20 to home.  ----------------------------------------  OUTPATIENT/FOLLOW UP:    Trae Whitney  Pulmonary Disease  [address], [address] [place] [address]  Phone: [phone]  Fax: [phone]  Follow Up Time: 2 weeks    Ryanne Allen  Rheumatology  [address] [address]  Phone: [phone]  Fax: [phone]  Follow Up Time: 1 week    Kyle Pirece  Internal Medicine  [address] [place] [address], [address]  [place] [address]  Phone: [phone]  Fax: [phone]  Follow Up Time: 2 weeks    Addy Powers  Pulmonary Disease  [address] [facility], [address]  [place], [place] [address]  Phone: [phone]  Fax: [phone]  Follow Up Time:     Kwame Hawley  Critical Care Medicine  [address] [facility], [address]  [place] [address]  Phone: [phone]  Fax: [phone]  Follow Up Time:     Neena Borrego  Rheumatology  [address] [place] [address], [address]  [place] [address]  Phone: [phone]  Fax: [phone]  Follow Up Time:    [facility]  [facility] [address]  Scheduled Appointment: 09/13/2023  2:40 PM        Assessment/Plan:  ALIZA FOLEY is a 62 year old female with PMH of pAFIB and sciatica; who presented to [facility] ED with SOB, and was found to have groundglass opacities and interstitial lung disease. She was treated with empiric Vancomycin and Zosyn. She underwent a bronchoscopy with bronchoalveolar lavage and pulse steroids. She was given IV diuretics for increased pulmonary congestion, but her respiratory status continued to worsen.  On 9/13, she was transferred to [facility] for ECMO requirements. She was further treated with Plasmapheresis, Rituximab and high-dose steroids, with significant improvement. Her overall hospital course was complicated by thrombocytopenia (s/p several platelet transfusions), leukocytosis (infectious workup entirely negative- s/p Vanco and Ceftriaxone), AFIB with RVR (resolved with Metoprolol), dysphagia (requiring NGT), transaminitis (secondary to acute illness and hypotension),  non-occlusive RIJ DVT (started on Lovenox). Patient now admitted for a multidisciplinary rehab program. 10-05-23 @ 13:18    * Sustained functional improvement--increasing ambulatory distance to 160 ft with RW, and functional distance with manual WC   * Therapy to focuson progressive ambulation and hip extensor strengthening   * Rheumatology labs will be ordered tomorrow and will f/u with Rheumat for results post dc  *  Labs unremarkable, lactate downtrending, no active infection  * Discussed issue with health insurance and  will f/u on this, working on dc home for 12/20    #Interstitial Lung Disease and Mixed connective tissue disease  - Gait Instability, ADL impairments and Functional impairments: start Comprehensive Rehab Program of PT/OT/SLP - 3 hours a day, 5 days a week  - P&O as needed   - Non-specific Interstitial Lung Disease  - Requiring ECMO   - Improvement s/p Plasmapheresis, Rituximab and high- dose steroids   - Albuterol/Ipratropium Nebulizer every 6 hours  - Mepron 1500mg daily  - PO Medrol ( due to liver dysfunction): Plan will be to taper by ~20% every 2 weeks    Medrol   64mg x 2 weeks   56mg x 2 weeks  44mg x 2 weeks   36mg x 2 weeks - Follow up Outpatient   - Plan to wean 02 as tolerated, Continue tapering oxygen,  -  CT Chest noted 11/10---Resp f/u review  11/14, appreciated  They reports sustained improvement, clinical (normal oxy sats on R/A, sustained progress with oxy tapering), and radiological (no acute findings on recent CT Chest, rather residual chronic infiltrative diesease noted, with recommendation to repeat CT after Acute rehab treatment   Rheumat rec Dr Vasquez 12/14  - Taper to Medrol 28mg x 2 weeks then 20mg x 2 weeks, then can taper by 4mg qweekly - 16mg, 12mg, 8mg. Remain on 4mg/day until next clinic visit   - continue with PCP ppx with Mepron and GI ppx with PPI while on steroids   - no further Rituxan needed at present time  - pt will [place] going to [place] shortly following hospital discharge - will have labs done prior to leaving, paper lab orders provided to her today, will arrange rheum f/u with Dr Escobar once back from [place] in late March so can facilitate next Rituxan dosing.   - Provide 1 month of medrol on discharge, my office will continue rx until she is seen by Dr Escobar. Pt has also been advised to find a rheumatologist where she will be in case of intermediate emergencies   --12/18-- Rheumatology labs will be ordered tomorrow and will f/u with Rheumat for results post dc    #Fever, resolved   - Recent UTI on Bactrim (since DCd)  - UCX with enterococcus faecalis  - 101.4 fever overnight (12/4-12/5)  - Lactate of 2.5- patient refused IV fluids   - Start Zosyn IVPB 12/6 (pt agreeable)  - RVP negative  - F/U blood cultures, CXR  - ID consult and Pulm re-eval (no respiratory symptoms)  - Pulm recs: continue to monitor   - ID recs: CT chest- stable (12/5)   - Repeat Lactate of 3.1 - agreeable to IV fluids- s/p 1000mL bolus   - BL LE doppler- negative   --UTI--treated   Lactate downtrending, no active infection, no need for continued monitoring     #Cankre sore--resolved    #Knee buckling--knee strength improving  - Back muscle strain with catching self on RW   - Lidocaine patch to back and BL deltoids    #Experiencing R mandible and occipital numbness, with associated hair loss- outpatient follow up with Rheumatology    #Posttnasal drip--ENT review 11/7, declined scope, continue flonase     #pAFIB/Rt Ij thrombus  - Metoprolol 25mg BID and lovenox  ---Eliquis 5mg BID - tolerating well   -- RUE venous duplex check for resolution of know DVT 12/11    #Chronic Tinnitus   - ENT review and recs appreciate, Patient already made ENT appointment for f/u    #Lymphedema both legs -chronic and Rt IJ thrombus  - ACE Wrap BL LEs  - BL LE doppler negative for DVT  - BL UE doppler with chronic thrombotic changes in RIJ     #Transaminitis --LFT Down trending   - Secondary to acute illness and hypotension at [facility]  - Outpatient follow up     #Leucocytosis--steroid related, continue monitoring   # Elevated Lactate--downtrending 3.6 on 12/14 will monitor, no signs of acute infection   Down trending, no need for further monitoring    #Neuropathy  - Gabapentin 300 mg BID, will consider increasing dose, increase to TID 11/10  --Work on motor strengthening, priority for UE as preferred by patient   - Vit b12 >2,000 in 9/2023  -- Rhuematology review 12/14 labs recommended, will order prior to dc and f/u with Rheumat    #Sleep/Mood  - Melatonin 6mg at HS  - Psych rec Xanax 0.25mg PRN    #Skin  - Wound care recs--dry dressing over tiny surface area wound Rt groin, no need for skilled nursing care post dc   --Bruise left lower shin, resolving  #Pain Mgmt   - Tylenol PRN   - Gabapentin 300mg at HS     #GI/Bowel Mgmt/Hx of alternating bowel movements  - Pantoprazole  - PRN: Maalox   --Lactobacillus BID     #/Bladder Mgmt   -Spontaneously voiding   - UA (11/3) negative  - +UTI- Amoxicillin  completed  - 12/14---Elevated lactate--f/u repeat level and other labs     #FEN   #Dysphagia  - Diet - Minced & Moist  [CC]    - Dysphagia- SLP evaluation appreciated     #Health maintenance  - Folic Acid   - MVI  - Ocean nasal spray    # Difficulty with access to peripheral veins-- Blood draws from Left arm Medline --RUE F/U venous duplex 12/11    #Precautions / PROPHYLAXIS:   - Falls  - ortho: Weight bearing status: WBAT   - Lungs: Aspiration, Incentive Spirometer   - DVT PPX: Eliquis 5 mg BID   ----------------------------------------  12/11- --Labs CBC mild anemia, normal renal function, LFT elevated, downtrending overall unremarkable   12/14--* elevated lactate downtrending     Health maintenance--await response from insurance company for the expedited appeal regarding acute rehab treatment   ----------------------------------------  Liaison with other providers/agencies  12/8--Discussed with Respiratory, no need for repeat CT chest this time, last imaging wead 12/5, Resp will f/u with patient post d/c      IDT conference on 12/5  Social Work: Awaiting insurance response on clinicals  SLP: -- n/a  OT: Setup for eating/grooming. min assistance for ADLs, mod assistance for toileting  Shower transfers mod assistance x 1  Independent with bed mobility  Ambulating via light gate 100--120ft   Amb 65 ft with bariatric RW and WCF with min A. 6 inch step mod A x2.   Contact guard for stand pivot transfers  Goal--min assist with ADLs  RT--Engaging for activity planning and relaxation exercises  DME: Bariatric RW, WC, drop arm commode  Barriers: New fevers  TDD: 12/20 to home.  ----------------------------------------  OUTPATIENT/FOLLOW UP:    Trae Whitney  Pulmonary Disease  [address], [address] [place] [address]  Phone: [phone]  Fax: [phone]  Follow Up Time: 2 weeks    Ryanne Allen  Rheumatology  [address] [address]  Phone: [phone]  Fax: [phone]  Follow Up Time: 1 week    Kyle Pierce  Internal Medicine  [address] [place] [address], [address]  [place] [address]  Phone: [phone]  Fax: [phone]  Follow Up Time: 2 weeks    Addy Powers  Pulmonary Disease  [address] [facility], [address]  [place], [place] [address]  Phone: [phone]  Fax: [phone]  Follow Up Time:     Kwame Hawley  Critical Care Medicine  [address] [facility], [address]  [place] [address]  Phone: [phone]  Fax: [phone]  Follow Up Time:     Neena Borrego  Rheumatology  [address] [place] [address], [address]  [place] [address]  Phone: [phone]  Fax: [phone]  Follow Up Time:    [facility]  [facility] [address]  Scheduled Appointment: 09/13/2023  2:40 PM

## 2023-12-18 NOTE — PROGRESS NOTE ADULT - ASSESSMENT
61 y/o F with chronic AFib, hx sciatica, newly diagnosed ILD (likely associated with MCTD +ARIANA, +RNP, +Sm) with exacerbation requiring ECMO / plasmapharesis / Rituximab / steriods, now at acute rehab.    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 Remain on 4mg/day until next clinic visit  -c/w nebs  -Mepron for PCP ppx while on steroids   -Check ambulatory O2 sat periodically  -Continue comprehensive rehab program - PT/OT/SLP per rehab team  -Pain management, bowel regimen per rehab     #Chronic macrocytic anemia  -H/H remains stable  -Continue MVI, folate and thiamine    #PAF  #Non-occlusive right IJ thrombus   -c/w Eliquis  -c/w lopressor 12.5mg po BID  -goal HR for pAfib is <110    #Anxiety  -appreciate psych follow-up  -c/w Xanax PRN    #?JACEY  #nocturnal desat  -discussed about CPAP use. oxygen use during sleep and PRN. patient may need OP sleep study. patient is aware. she will speak to her pulm.    #Severe protein-calorie malnutrition  -Nutrition following    #DVT ppx: Eliquis 63 y/o F with chronic AFib, hx sciatica, newly diagnosed ILD (likely associated with MCTD +ARIANA, +RNP, +Sm) with exacerbation requiring ECMO / plasmapharesis / Rituximab / steriods, now at acute rehab.    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 Remain on 4mg/day until next clinic visit  -c/w nebs  -Mepron for PCP ppx while on steroids   -Check ambulatory O2 sat periodically  -Continue comprehensive rehab program - PT/OT/SLP per rehab team  -Pain management, bowel regimen per rehab     #Chronic macrocytic anemia  -H/H remains stable  -Continue MVI, folate and thiamine    #PAF  #Non-occlusive right IJ thrombus   -c/w Eliquis  -c/w lopressor 12.5mg po BID  -goal HR for pAfib is <110    #Anxiety  -appreciate psych follow-up  -c/w Xanax PRN    #?JACEY  #nocturnal desat  -discussed about CPAP use. oxygen use during sleep and PRN. patient may need OP sleep study. patient is aware. she will speak to her pulm.    #Severe protein-calorie malnutrition  -Nutrition following    #DVT ppx: Eliquis
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
## 2023-12-19 ENCOUNTER — NON-APPOINTMENT (OUTPATIENT)
Age: 63
End: 2023-12-19

## 2023-12-19 LAB
ALBUMIN SERPL ELPH-MCNC: 2.7 G/DL — LOW (ref 3.3–5)
ALBUMIN SERPL ELPH-MCNC: 2.7 G/DL — LOW (ref 3.3–5)
ALP SERPL-CCNC: 104 U/L — SIGNIFICANT CHANGE UP (ref 40–120)
ALP SERPL-CCNC: 104 U/L — SIGNIFICANT CHANGE UP (ref 40–120)
ALT FLD-CCNC: 46 U/L — HIGH (ref 10–45)
ALT FLD-CCNC: 46 U/L — HIGH (ref 10–45)
ANION GAP SERPL CALC-SCNC: 10 MMOL/L — SIGNIFICANT CHANGE UP (ref 5–17)
ANION GAP SERPL CALC-SCNC: 10 MMOL/L — SIGNIFICANT CHANGE UP (ref 5–17)
AST SERPL-CCNC: 20 U/L — SIGNIFICANT CHANGE UP (ref 10–40)
AST SERPL-CCNC: 20 U/L — SIGNIFICANT CHANGE UP (ref 10–40)
BILIRUB SERPL-MCNC: 0.5 MG/DL — SIGNIFICANT CHANGE UP (ref 0.2–1.2)
BILIRUB SERPL-MCNC: 0.5 MG/DL — SIGNIFICANT CHANGE UP (ref 0.2–1.2)
BUN SERPL-MCNC: 24 MG/DL — HIGH (ref 7–23)
BUN SERPL-MCNC: 24 MG/DL — HIGH (ref 7–23)
C3 SERPL-MCNC: 119 MG/DL — SIGNIFICANT CHANGE UP (ref 81–157)
C3 SERPL-MCNC: 119 MG/DL — SIGNIFICANT CHANGE UP (ref 81–157)
C4 SERPL-MCNC: 24 MG/DL — SIGNIFICANT CHANGE UP (ref 13–39)
C4 SERPL-MCNC: 24 MG/DL — SIGNIFICANT CHANGE UP (ref 13–39)
CALCIUM SERPL-MCNC: 9.1 MG/DL — SIGNIFICANT CHANGE UP (ref 8.4–10.5)
CALCIUM SERPL-MCNC: 9.1 MG/DL — SIGNIFICANT CHANGE UP (ref 8.4–10.5)
CHLORIDE SERPL-SCNC: 104 MMOL/L — SIGNIFICANT CHANGE UP (ref 96–108)
CHLORIDE SERPL-SCNC: 104 MMOL/L — SIGNIFICANT CHANGE UP (ref 96–108)
CK SERPL-CCNC: 10 U/L — LOW (ref 25–170)
CK SERPL-CCNC: 10 U/L — LOW (ref 25–170)
CO2 SERPL-SCNC: 27 MMOL/L — SIGNIFICANT CHANGE UP (ref 22–31)
CO2 SERPL-SCNC: 27 MMOL/L — SIGNIFICANT CHANGE UP (ref 22–31)
CREAT SERPL-MCNC: 0.65 MG/DL — SIGNIFICANT CHANGE UP (ref 0.5–1.3)
CREAT SERPL-MCNC: 0.65 MG/DL — SIGNIFICANT CHANGE UP (ref 0.5–1.3)
EGFR: 98 ML/MIN/1.73M2 — SIGNIFICANT CHANGE UP
EGFR: 98 ML/MIN/1.73M2 — SIGNIFICANT CHANGE UP
ERYTHROCYTE [SEDIMENTATION RATE] IN BLOOD: 7 MM/HR — SIGNIFICANT CHANGE UP (ref 0–20)
ERYTHROCYTE [SEDIMENTATION RATE] IN BLOOD: 7 MM/HR — SIGNIFICANT CHANGE UP (ref 0–20)
GLUCOSE SERPL-MCNC: 123 MG/DL — HIGH (ref 70–99)
GLUCOSE SERPL-MCNC: 123 MG/DL — HIGH (ref 70–99)
POTASSIUM SERPL-MCNC: 3.4 MMOL/L — LOW (ref 3.5–5.3)
POTASSIUM SERPL-MCNC: 3.4 MMOL/L — LOW (ref 3.5–5.3)
POTASSIUM SERPL-SCNC: 3.4 MMOL/L — LOW (ref 3.5–5.3)
POTASSIUM SERPL-SCNC: 3.4 MMOL/L — LOW (ref 3.5–5.3)
PROT SERPL-MCNC: 5.4 G/DL — LOW (ref 6–8.3)
PROT SERPL-MCNC: 5.4 G/DL — LOW (ref 6–8.3)
SODIUM SERPL-SCNC: 141 MMOL/L — SIGNIFICANT CHANGE UP (ref 135–145)
SODIUM SERPL-SCNC: 141 MMOL/L — SIGNIFICANT CHANGE UP (ref 135–145)

## 2023-12-19 RX ORDER — POTASSIUM CHLORIDE 20 MEQ
40 PACKET (EA) ORAL ONCE
Refills: 0 | Status: COMPLETED | OUTPATIENT
Start: 2023-12-19 | End: 2023-12-19

## 2023-12-19 RX ADMIN — Medication 1 MILLIGRAM(S): at 21:58

## 2023-12-19 RX ADMIN — ATOVAQUONE 1500 MILLIGRAM(S): 750 SUSPENSION ORAL at 21:58

## 2023-12-19 RX ADMIN — Medication 1 TABLET(S): at 22:00

## 2023-12-19 RX ADMIN — Medication 12.5 MILLIGRAM(S): at 21:56

## 2023-12-19 RX ADMIN — Medication 12.5 MILLIGRAM(S): at 08:25

## 2023-12-19 RX ADMIN — APIXABAN 5 MILLIGRAM(S): 2.5 TABLET, FILM COATED ORAL at 08:25

## 2023-12-19 RX ADMIN — GABAPENTIN 300 MILLIGRAM(S): 400 CAPSULE ORAL at 08:25

## 2023-12-19 RX ADMIN — APIXABAN 5 MILLIGRAM(S): 2.5 TABLET, FILM COATED ORAL at 21:56

## 2023-12-19 RX ADMIN — NYSTATIN CREAM 1 APPLICATION(S): 100000 CREAM TOPICAL at 08:27

## 2023-12-19 RX ADMIN — Medication 650 MILLIGRAM(S): at 23:07

## 2023-12-19 RX ADMIN — Medication 1 TABLET(S): at 21:57

## 2023-12-19 RX ADMIN — ZINC OXIDE 1 APPLICATION(S): 200 OINTMENT TOPICAL at 08:27

## 2023-12-19 RX ADMIN — LIDOCAINE 2 PATCH: 4 CREAM TOPICAL at 08:24

## 2023-12-19 RX ADMIN — LIDOCAINE 1 PATCH: 4 CREAM TOPICAL at 08:25

## 2023-12-19 RX ADMIN — Medication 5 MILLIGRAM(S): at 15:24

## 2023-12-19 RX ADMIN — Medication 5 MILLIGRAM(S): at 21:54

## 2023-12-19 RX ADMIN — PANTOPRAZOLE SODIUM 40 MILLIGRAM(S): 20 TABLET, DELAYED RELEASE ORAL at 08:25

## 2023-12-19 RX ADMIN — Medication 650 MILLIGRAM(S): at 22:07

## 2023-12-19 RX ADMIN — ZINC OXIDE 1 APPLICATION(S): 200 OINTMENT TOPICAL at 11:26

## 2023-12-19 RX ADMIN — Medication 28 MILLIGRAM(S): at 08:26

## 2023-12-19 RX ADMIN — GABAPENTIN 300 MILLIGRAM(S): 400 CAPSULE ORAL at 15:24

## 2023-12-19 RX ADMIN — Medication 5 MILLIGRAM(S): at 12:45

## 2023-12-19 RX ADMIN — Medication 500 MILLIGRAM(S): at 21:59

## 2023-12-19 RX ADMIN — Medication 1 TABLET(S): at 08:25

## 2023-12-19 RX ADMIN — GABAPENTIN 300 MILLIGRAM(S): 400 CAPSULE ORAL at 21:54

## 2023-12-19 NOTE — CHART NOTE - NSCHARTNOTESELECT_GEN_ALL_CORE
IDT Conference/Off Service Note
IDT Note/Off Service Note
IDT conference/Off Service Note
IDT/Off Service Note
IPOC/Event Note
Nutrition Services
Event Note
I called radiologist/Event Note
IDT Conference/Off Service Note
IDT Conference/Off Service Note
Neuropsychology
Nutrition Services
Steroid Taper/Event Note
Bedside Discussion/Event Note
Fever/Event Note
Nutrition Services
Nutrition Services

## 2023-12-19 NOTE — PROGRESS NOTE ADULT - ASSESSMENT
Physical Examination:  GENERAL:                Alert, Oriented, No acute distress.     PULM:                     Bilateral air entry,  poor air entry at bases No Rales, No Rhonchi, No Wheezing  CVS:                         S1, S2,  No Murmur   EXT:                        Trace non pitting edema, nontender, No Cyanosis or Clubbing   NEURO:                  Alert, oriented, interactive, bilateral upper and lower extremity weakness   PSYC:                      Calm, + Insight.      Assessment  62F PMH A-Fib with recently diagnosed MCTD-ILD (+ARIANA 1:1280, RNP>8, Sm>8) who was admitted to [facility] with acute hypoxemic respiratory failure that initially responded to steroids, then with worsening after taper with development of acute hypoxemic and hypercapnic respiratory failure requiring intubation and VV- ECMO on 9/13, then transferred to [facility], concerning for DAH vs ILD flare, decannulated from VV-ECMO 9/21, extubated 9/22. S/p pulse steroids, PLEX (9/15, 9/18, 9/20) and Rituximab (9/16 & 10/3). Course c/b severe leukopenia s/p filgastrim, shock liver with slow to resolved hyperbilirubinemia, RIJ DVT, oropharyngeal dysphagia, and recurrent SVT. Repeat CT chest on 9/30 with markedly improved bilateral groundglass opacities with resolved lung consolidations. Now in acute rehab on 4 lpm NC oxygen and tapering dose of medrol.     Problem List:  1. Interstitial lung disease - due to   2. Mixed connective tissue disease   3. Acute hypoxia - Resolved  4. RIJ DVT     Plan:  Antibiotics as per primary team for UTI  Continue PCP ppx with atovaquone   f/u lactic acid, patient does not want IVF.     - Cont. Methylprednisolone PO, taper as per rheumatology recs. from [facility]   - Continue atovaquone for PCP prophylaxis  - S/p Rituximab 9/16 and 10/3  - S/p PLEX during MICU stay  - Repeat CT scan reviewed, findings do not suggest acute infection but chronic infiltrative disease that is improving, no acute intervention required, will recommend out patient pulmonary follow up with repeat CT after rehab stay.  - Cont. anticoagulation for DVT associated with ECMO cannula  - Consider incentive spirometry   - Care per primary team  - Discussed with spouse at bedside   - PT OOB as tolerated  - Outpatient pulmonary and rheumatology follow up upon discharge      states already has MDs lined up to follow wi  Physical Examination:  GENERAL:                Alert, Oriented, No acute distress.     PULM:                     Bilateral air entry,  poor air entry at bases No Rales, No Rhonchi, No Wheezing  CVS:                         S1, S2,  No Murmur   EXT:                        Trace non pitting edema, nontender, No Cyanosis or Clubbing   NEURO:                  Alert, oriented, interactive, bilateral upper and lower extremity weakness   PSYC:                      Calm, + Insight.      Assessment  62F PMH A-Fib with recently diagnosed MCTD-ILD (+ARIANA 1:1280, RNP>8, Sm>8) who was admitted to [facility] with acute hypoxemic respiratory failure that initially responded to steroids, then with worsening after taper with development of acute hypoxemic and hypercapnic respiratory failure requiring intubation and VV- ECMO on 9/13, then transferred to [facility], concerning for DAH vs ILD flare, decannulated from VV-ECMO 9/21, extubated 9/22. S/p pulse steroids, PLEX (9/15, 9/18, 9/20) and Rituximab (9/16 & 10/3). Course c/b severe leukopenia s/p filgastrim, shock liver with slow to resolved hyperbilirubinemia, RIJ DVT, oropharyngeal dysphagia, and recurrent SVT. Repeat CT chest on 9/30 with markedly improved bilateral groundglass opacities with resolved lung consolidations. Now in acute rehab on 4 lpm NC oxygen and tapering dose of medrol.     Problem List:  1. Interstitial lung disease - due to   2. Mixed connective tissue disease   3. Acute hypoxia - Resolved  4. RIJ DVT     Plan:  Antibiotics as per primary team for UTI  Continue PCP ppx with atovaquone   f/u lactic acid, patient does not want IVF.     - Cont. Methylprednisolone PO, taper as per rheumatology recs. from [facility]   - Continue atovaquone for PCP prophylaxis  - S/p Rituximab 9/16 and 10/3  - S/p PLEX during MICU stay  - Repeat CT scan reviewed, findings do not suggest acute infection but chronic infiltrative disease that is improving, no acute intervention required, will recommend out patient pulmonary follow up with repeat CT after rehab stay.  - Cont. anticoagulation for DVT associated with ECMO cannula  - Consider incentive spirometry   - Care per primary team  - Discussed with spouse at bedside   - PT OOB as tolerated  - Outpatient pulmonary and rheumatology follow up upon discharge      states already has MDs lined up to follow wi

## 2023-12-19 NOTE — PROGRESS NOTE ADULT - SUBJECTIVE AND OBJECTIVE BOX
Follow-up Pulmonary Progress Note  Chief Complaint : Interstitial lung disease          No new events overnight.  Denies SOB/CP.   on room air  states being discharged emili  no pulmonary complaints    Allergies :No Known Allergies      PAST MEDICAL & SURGICAL HISTORY:  Afib    Sciatica    Interstitial lung disease    Lymphedema        Medications:  MEDICATIONS  (STANDING):  apixaban 5 milliGRAM(s) Oral every 12 hours  artificial  tears Solution 1 Drop(s) Both EYES every 12 hours  ascorbic acid 500 milliGRAM(s) Oral daily  atovaquone  Suspension 1500 milliGRAM(s) Oral daily  dextrose 5%. 1000 milliLiter(s) (100 mL/Hr) IV Continuous <Continuous>  dextrose 5%. 1000 milliLiter(s) (50 mL/Hr) IV Continuous <Continuous>  dextrose 50% Injectable 12.5 Gram(s) IV Push once  dextrose 50% Injectable 25 Gram(s) IV Push once  dextrose 50% Injectable 25 Gram(s) IV Push once  folic acid 1 milliGRAM(s) Oral daily  gabapentin 300 milliGRAM(s) Oral three times a day  glucagon  Injectable 1 milliGRAM(s) IntraMuscular once  lactobacillus acidophilus 1 Tablet(s) Oral two times a day with meals  lidocaine   4% Patch 2 Patch Transdermal <User Schedule>  lidocaine   4% Patch 1 Patch Transdermal <User Schedule>  melatonin 6 milliGRAM(s) Oral at bedtime  methylPREDNISolone 28 milliGRAM(s) Oral daily  metoprolol tartrate 12.5 milliGRAM(s) Oral two times a day  multivitamin 1 Tablet(s) Oral daily  nystatin Powder 1 Application(s) Topical two times a day  pantoprazole    Tablet 40 milliGRAM(s) Oral before breakfast  polyethylene glycol 3350 17 Gram(s) Oral <User Schedule>  sodium chloride 0.9% Bolus 1000 milliLiter(s) IV Bolus once  sodium chloride 0.9% lock flush 5 milliLiter(s) IV Push two times a day  sodium chloride 0.9%. 1000 milliLiter(s) (100 mL/Hr) IV Continuous <Continuous>  zinc oxide 40% Paste 1 Application(s) Topical three times a day    MEDICATIONS  (PRN):  acetaminophen     Tablet .. 650 milliGRAM(s) Oral every 6 hours PRN Temp greater or equal to 38C (100.4F), Mild Pain (1 - 3), Moderate Pain (4 - 6)  albuterol/ipratropium for Nebulization 3 milliLiter(s) Nebulizer every 6 hours PRN Shortness of Breath and/or Wheezing  ALPRAZolam 0.25 milliGRAM(s) Oral every 6 hours PRN Anxiety  aluminum hydroxide/magnesium hydroxide/simethicone Suspension 30 milliLiter(s) Oral every 4 hours PRN Dyspepsia  ammonium lactate 12% Lotion 1 Application(s) Topical every 12 hours PRN BL feet dryness  baclofen 5 milliGRAM(s) Oral every 8 hours PRN Musculoskeletal Pain  benzocaine/menthol Lozenge 1 Lozenge Oral two times a day PRN Sore Throat  benzonatate 100 milliGRAM(s) Oral three times a day PRN Cough  bisacodyl 5 milliGRAM(s) Oral daily PRN Constipation  dextrose Oral Gel 15 Gram(s) Oral once PRN Blood Glucose LESS THAN 70 milliGRAM(s)/deciliter  hydrocortisone hemorrhoidal Suppository 1 Suppository(s) Rectal two times a day PRN Hemorrhoids  loperamide 2 milliGRAM(s) Oral three times a day PRN Diarrhea  SIMETHICONE SOFTGELS 250 milliGRAM(s),SIMETHICONE SOFTGELS 250 milliGRAM(s) 250 milliGRAM(s) Oral three times a day PRN for gas  sodium chloride 0.65% Nasal 1 Spray(s) Both Nostrils every 8 hours PRN Nasal Congestion      Antibiotics History  amoxicillin 500 milliGRAM(s) Oral three times a day, 12-07-23 @ 20:34  levoFLOXacin IVPB 500 milliGRAM(s) IV Intermittent once, 12-07-23 @ 13:51  levoFLOXacin IVPB    , 12-07-23 @ 14:11  levoFLOXacin IVPB 500 milliGRAM(s) IV Intermittent every 24 hours, 12-08-23 @ 13:51, Stop order after: 5 Doses  piperacillin/tazobactam IVPB. 3.375 Gram(s) IV Intermittent once, 12-06-23 @ 09:45, Stop order after: 1 Doses  piperacillin/tazobactam IVPB. 3.375 Gram(s) IV Intermittent once, 12-05-23 @ 12:15, Stop order after: 1 Doses  piperacillin/tazobactam IVPB.. 3.375 Gram(s) IV Intermittent every 8 hours, 12-05-23 @ 12:14, Stop order after: 7 Days  piperacillin/tazobactam IVPB.. 3.375 Gram(s) IV Intermittent every 8 hours, 12-06-23 @ 09:44, Stop order after: 7 Days  trimethoprim  160 mG/sulfamethoxazole 800 mG 1 Tablet(s) Oral two times a day, 12-05-23 @ 18:00, Stop order after: 2 Days  trimethoprim  160 mG/sulfamethoxazole 800 mG 1 Tablet(s) Oral two times a day, 12-04-23 @ 12:47, Stop order after: 3 Days      Heme Medications       GI Medications  bisacodyl 5 milliGRAM(s) Oral daily, 12-01-23 @ 10:45,  PRN  polyethylene glycol 3350 17 Gram(s) Oral <User Schedule>, 12-01-23 @ 10:58,         LABS:                        10.3   7.46  )-----------( 306      ( 18 Dec 2023 08:40 )             34.2     12-19    141  |  104  |  24<H>  ----------------------------<  123<H>  3.4<L>   |  27  |  0.65    Ca    9.1      19 Dec 2023 09:17    TPro  5.4<L>  /  Alb  2.7<L>  /  TBili  0.5  /  DBili  x   /  AST  20  /  ALT  46<H>  /  AlkPhos  104  12-19      CARDIAC MARKERS ( 19 Dec 2023 09:17 )  x     / x     / 10 U/L / x     / x              Urinalysis Basic - ( 19 Dec 2023 09:17 )    Color: x / Appearance: x / SG: x / pH: x  Gluc: 123 mg/dL / Ketone: x  / Bili: x / Urobili: x   Blood: x / Protein: x / Nitrite: x   Leuk Esterase: x / RBC: x / WBC x   Sq Epi: x / Non Sq Epi: x / Bacteria: x              CULTURES: (if applicable)    Culture - Urine (collected 12-15-23 @ 21:45)  Source: Clean Catch Clean Catch (Midstream)  Final Report (12-18-23 @ 09:02):    >=3 organisms. Probable collection contamination.    Culture - Blood (collected 12-15-23 @ 18:12)  Source: .Blood Blood-Peripheral  Preliminary Report (12-19-23 @ 03:01):    No growth at 72 Hours    Culture - Blood (collected 12-15-23 @ 18:05)  Source: .Blood Blood-Peripheral  Preliminary Report (12-19-23 @ 03:01):    No growth at 72 Hours           VITALS:  T(C): 36.7 (12-19-23 @ 08:22), Max: 36.9 (12-18-23 @ 20:30)  T(F): 98 (12-19-23 @ 08:22), Max: 98.5 (12-18-23 @ 20:30)  HR: 89 (12-19-23 @ 08:22) (88 - 89)  BP: 127/73 (12-19-23 @ 08:22) (127/73 - 139/67)  BP(mean): --  ABP: --  ABP(mean): --  RR: 16 (12-19-23 @ 08:22) (16 - 16)  SpO2: 92% (12-19-23 @ 08:22) (92% - 94%)  CVP(mm Hg): --  CVP(cm H2O): --    Ins and Outs

## 2023-12-19 NOTE — CHART NOTE - NSCHARTNOTEFT_GEN_A_CORE
IDT conference on 12/19  Social Work: --   SLP: --  OT: Setup for eating/grooming. Sup for UBD. CG for LBD/toileting. Min A for shower & toilet transfer.   PT: CG/CS for all. 1 6 inch step with mod A x2. Car transfers with CGA.   RT: --  DME: WC     Barriers: --   TDD: 12/20 to home

## 2023-12-19 NOTE — PROGRESS NOTE ADULT - ASSESSMENT
Assessment/Plan:  ALIZA FOLEY is a 62 year old female with PMH of pAFIB and sciatica; who presented to [facility] ED with SOB, and was found to have groundglass opacities and interstitial lung disease. She was treated with empiric Vancomycin and Zosyn. She underwent a bronchoscopy with bronchoalveolar lavage and pulse steroids. She was given IV diuretics for increased pulmonary congestion, but her respiratory status continued to worsen.  On 9/13, she was transferred to [facility] for ECMO requirements. She was further treated with Plasmapheresis, Rituximab and high-dose steroids, with significant improvement. Her overall hospital course was complicated by thrombocytopenia (s/p several platelet transfusions), leukocytosis (infectious workup entirely negative- s/p Vanco and Ceftriaxone), AFIB with RVR (resolved with Metoprolol), dysphagia (requiring NGT), transaminitis (secondary to acute illness and hypotension),  non-occlusive RIJ DVT (started on Lovenox). Patient now admitted for a multidisciplinary rehab program. 10-05-23 @ 13:18    * Sustained functional improvement--increasing ambulatory distance to 160 ft with RW, and functional distance with manual WC   * Therapy to focuson progressive ambulation and hip extensor strengthening   * Rheumatology labs will be ordered tomorrow and will f/u with Rheum for results post dc  * DC home for 12/20     #Interstitial Lung Disease and Mixed connective tissue disease  - Gait Instability, ADL impairments and Functional impairments: start Comprehensive Rehab Program of PT/OT/SLP - 3 hours a day, 5 days a week  - P&O as needed   - Non-specific Interstitial Lung Disease  - Requiring ECMO   - Improvement s/p Plasmapheresis, Rituximab and high- dose steroids   - Albuterol/Ipratropium Nebulizer every 6 hours  - Mepron 1500mg daily  - PO Medrol ( due to liver dysfunction): Plan will be to taper by ~20% every 2 weeks    Medrol   64mg x 2 weeks   56mg x 2 weeks  44mg x 2 weeks   36mg x 2 weeks - Follow up Outpatient   - Plan to wean 02 as tolerated, Continue tapering oxygen,  -  CT Chest noted 11/10---Resp f/u review  11/14, appreciated  They reports sustained improvement, clinical (normal oxy sats on R/A, sustained progress with oxy tapering), and radiological (no acute findings on recent CT Chest, rather residual chronic infiltrative diesease noted, with recommendation to repeat CT after Acute rehab treatment   Rheumat rec Dr Vasquez 12/14  - Taper to Medrol 28mg x 2 weeks then 20mg x 2 weeks, then can taper by 4mg qweekly - 16mg, 12mg, 8mg. Remain on 4mg/day until next clinic visit   - continue with PCP ppx with Mepron and GI ppx with PPI while on steroids   - no further Rituxan needed at present time  - pt will [place] going to [place] shortly following hospital discharge - will have labs done prior to leaving, paper lab orders provided to her today, will arrange rheum f/u with Dr Escobar once back from [place] in late March so can facilitate next Rituxan dosing.   - Provide 1 month of medrol on discharge, my office will continue rx until she is seen by Dr Escobar. Pt has also been advised to find a rheumatologist where she will be in case of intermediate emergencies   --12/18-- Rheumatology labs will be ordered tomorrow and will f/u with Rheumat for results post dc    #Fever, resolved   - Recent UTI on Bactrim (since DCd)  - UCX with enterococcus faecalis  - 101.4 fever overnight (12/4-12/5)  - Lactate of 2.5- patient refused IV fluids   - Start Zosyn IVPB 12/6 (pt agreeable)  - RVP negative  - F/U blood cultures, CXR  - ID consult and Pulm re-eval (no respiratory symptoms)  - Pulm recs: continue to monitor   - ID recs: CT chest- stable (12/5)   - Repeat Lactate of 3.1 - agreeable to IV fluids- s/p 1000mL bolus   - BL LE doppler- negative   --UTI--treated   Lactate downtrending, no active infection, no need for continued monitoring     #Cankre sore--resolved    #Knee buckling--knee strength improving  - Back muscle strain with catching self on RW   - Lidocaine patch to back and BL deltoids    #Experiencing R mandible and occipital numbness, with associated hair loss- outpatient follow up with Rheumatology    #Posttnasal drip--ENT review 11/7, declined scope, continue flonase     #pAFIB/Rt Ij thrombus  - Metoprolol 25mg BID and lovenox  ---Eliquis 5mg BID - tolerating well   -- RUE venous duplex check for resolution of know DVT 12/11    #Chronic Tinnitus   - ENT review and recs appreciate, Patient already made ENT appointment for f/u    #Lymphedema both legs -chronic and Rt IJ thrombus  - ACE Wrap BL LEs  - BL LE doppler negative for DVT  - BL UE doppler with chronic thrombotic changes in RIJ     #Transaminitis --LFT Down trending   - Secondary to acute illness and hypotension at [facility]  - Outpatient follow up     #Leucocytosis--steroid related, continue monitoring   # Elevated Lactate--downtrending 3.6 on 12/14 will monitor, no signs of acute infection   Down trending, no need for further monitoring    #Neuropathy  - Gabapentin 300 mg BID, will consider increasing dose, increase to TID 11/10  --Work on motor strengthening, priority for UE as preferred by patient   - Vit b12 >2,000 in 9/2023  -- Rhuematology review 12/14 labs recommended, will order prior to dc and f/u with Rheumat    #Sleep/Mood  - Melatonin 6mg at HS  - Psych rec Xanax 0.25mg PRN    #Skin  - Wound care recs--dry dressing over tiny surface area wound Rt groin, no need for skilled nursing care post dc   --Bruise left lower shin, resolving     #Pain Mgmt   - Tylenol PRN   - Gabapentin 300mg at HS     #GI/Bowel Mgmt/Hx of alternating bowel movements  - Pantoprazole  - PRN: Maalox   --Lactobacillus BID     #/Bladder Mgmt   -Spontaneously voiding   - UA (11/3) negative  - +UTI- Amoxicillin  completed  - 12/14---Elevated lactate--f/u repeat level and other labs     #FEN   #Dysphagia  - Diet - Minced & Moist  [CC]    - Dysphagia- SLP evaluation appreciated     #Health maintenance  - Folic Acid   - MVI  - Ocean nasal spray    # Difficulty with access to peripheral veins-- Blood draws from Left arm Medline --RUE F/U venous duplex 12/11    #Precautions / PROPHYLAXIS:   - Falls  - ortho: Weight bearing status: WBAT   - Lungs: Aspiration, Incentive Spirometer   - DVT PPX: Eliquis 5 mg BID   ----------------------------------------  12/11- --Labs CBC mild anemia, normal renal function, LFT elevated, downtrending overall unremarkable   12/14--* elevated lactate downtrending     Health maintenance--await response from insurance company for the expedited appeal regarding acute rehab treatment   ----------------------------------------  Liaison with other providers/agencies  12/8--Discussed with Respiratory, no need for repeat CT chest this time, last imaging wead 12/5, Resp will f/u with patient post d/c      IDT conference on 12/5  Social Work: Awaiting insurance response on clinicals  SLP: -- n/a  OT: Setup for eating/grooming. min assistance for ADLs, mod assistance for toileting  Shower transfers mod assistance x 1  Independent with bed mobility  Ambulating via light gate 100--120ft   Amb 65 ft with bariatric RW and WCF with min A. 6 inch step mod A x2.   Contact guard for stand pivot transfers  Goal--min assist with ADLs  RT--Engaging for activity planning and relaxation exercises  DME: Bariatric RW, WC, drop arm commode  Barriers: New fevers  TDD: 12/20 to home.  ----------------------------------------  OUTPATIENT/FOLLOW UP:    Trae Whitney  Pulmonary Disease  [address], [address] [place] [address]  Phone: [phone]  Fax: [phone]  Follow Up Time: 2 weeks    Ryanne Allen  Rheumatology  [address] [address]  Phone: [phone]  Fax: [phone]  Follow Up Time: 1 week    Kyle Pierce  Internal Medicine  [address] [place] [address], [address]  [place] [address]  Phone: [phone]  Fax: [phone]  Follow Up Time: 2 weeks    Addy Powers  Pulmonary Disease  [address] [facility], [address]  [place], [place] [id number]  Phone: [phone]  Fax: [phone]  Follow Up Time:     Kwame Hawley  Critical Care Medicine  [address] [facility], [address]  [place] [address]  Phone: [phone]  Fax: [phone]  Follow Up Time:     Neena Borrego  Rheumatology  [address] [facility], [address] 3  [place] [address]  Phone: [phone]  Fax: [phone]  Follow Up Time:    [facility]  [facility] [address]  Scheduled Appointment: 09/13/2023  2:40 PM        Assessment/Plan:  ALIZA FOLEY is a 62 year old female with PMH of pAFIB and sciatica; who presented to [facility] ED with SOB, and was found to have groundglass opacities and interstitial lung disease. She was treated with empiric Vancomycin and Zosyn. She underwent a bronchoscopy with bronchoalveolar lavage and pulse steroids. She was given IV diuretics for increased pulmonary congestion, but her respiratory status continued to worsen.  On 9/13, she was transferred to [facility] for ECMO requirements. She was further treated with Plasmapheresis, Rituximab and high-dose steroids, with significant improvement. Her overall hospital course was complicated by thrombocytopenia (s/p several platelet transfusions), leukocytosis (infectious workup entirely negative- s/p Vanco and Ceftriaxone), AFIB with RVR (resolved with Metoprolol), dysphagia (requiring NGT), transaminitis (secondary to acute illness and hypotension),  non-occlusive RIJ DVT (started on Lovenox). Patient now admitted for a multidisciplinary rehab program. 10-05-23 @ 13:18    * Sustained functional improvement--increasing ambulatory distance to 160 ft with RW, and functional distance with manual WC   * Therapy to focuson progressive ambulation and hip extensor strengthening   * Rheumatology labs will be ordered tomorrow and will f/u with Rheum for results post dc  * DC home for 12/20     #Interstitial Lung Disease and Mixed connective tissue disease  - Gait Instability, ADL impairments and Functional impairments: start Comprehensive Rehab Program of PT/OT/SLP - 3 hours a day, 5 days a week  - P&O as needed   - Non-specific Interstitial Lung Disease  - Requiring ECMO   - Improvement s/p Plasmapheresis, Rituximab and high- dose steroids   - Albuterol/Ipratropium Nebulizer every 6 hours  - Mepron 1500mg daily  - PO Medrol ( due to liver dysfunction): Plan will be to taper by ~20% every 2 weeks    Medrol   64mg x 2 weeks   56mg x 2 weeks  44mg x 2 weeks   36mg x 2 weeks - Follow up Outpatient   - Plan to wean 02 as tolerated, Continue tapering oxygen,  -  CT Chest noted 11/10---Resp f/u review  11/14, appreciated  They reports sustained improvement, clinical (normal oxy sats on R/A, sustained progress with oxy tapering), and radiological (no acute findings on recent CT Chest, rather residual chronic infiltrative diesease noted, with recommendation to repeat CT after Acute rehab treatment   Rheumat rec Dr Vasquez 12/14  - Taper to Medrol 28mg x 2 weeks then 20mg x 2 weeks, then can taper by 4mg qweekly - 16mg, 12mg, 8mg. Remain on 4mg/day until next clinic visit   - continue with PCP ppx with Mepron and GI ppx with PPI while on steroids   - no further Rituxan needed at present time  - pt will be going to [place] shortly following hospital discharge - will have labs done prior to leaving, paper lab orders provided to her today, will arrange rheum f/u with Dr Escobar once back from [place] in late March so can facilitate next Rituxan dosing.   - Provide 1 month of medrol on discharge, my office will continue rx until she is seen by Dr Escobar. Pt has also been advised to find a rheumatologist where she will be in case of intermediate emergencies   --12/18-- Rheumatology labs will be ordered tomorrow and will f/u with Rheumat for results post dc    #Fever, resolved   - Recent UTI on Bactrim (since DCd)  - UCX with enterococcus faecalis  - 101.4 fever overnight (12/4-12/5)  - Lactate of 2.5- patient refused IV fluids   - Start Zosyn IVPB 12/6 (pt agreeable)  - RVP negative  - F/U blood cultures, CXR  - ID consult and Pulm re-eval (no respiratory symptoms)  - Pulm recs: continue to monitor   - ID recs: CT chest- stable (12/5)   - Repeat Lactate of 3.1 - agreeable to IV fluids- s/p 1000mL bolus   - BL LE doppler- negative   --UTI--treated   Lactate downtrending, no active infection, no need for continued monitoring     #Cankre sore--resolved    #Knee buckling--knee strength improving  - Back muscle strain with catching self on RW   - Lidocaine patch to back and BL deltoids    #Experiencing R mandible and occipital numbness, with associated hair loss- outpatient follow up with Rheumatology    #Posttnasal drip--ENT review 11/7, declined scope, continue flonase     #pAFIB/Rt Ij thrombus  - Metoprolol 25mg BID and lovenox  ---Eliquis 5mg BID - tolerating well   -- RUE venous duplex check for resolution of know DVT 12/11    #Chronic Tinnitus   - ENT review and recs appreciate, Patient already made ENT appointment for f/u    #Lymphedema both legs -chronic and Rt IJ thrombus  - ACE Wrap BL LEs  - BL LE doppler negative for DVT  - BL UE doppler with chronic thrombotic changes in RIJ     #Transaminitis --LFT Down trending   - Secondary to acute illness and hypotension at [facility]  - Outpatient follow up     #Leucocytosis--steroid related, continue monitoring   # Elevated Lactate--downtrending 3.6 on 12/14 will monitor, no signs of acute infection   Down trending, no need for further monitoring    #Neuropathy  - Gabapentin 300 mg BID, will consider increasing dose, increase to TID 11/10  --Work on motor strengthening, priority for UE as preferred by patient   - Vit b12 >2,000 in 9/2023  -- Rhuematology review 12/14 labs recommended, will order prior to dc and f/u with Rheumat    #Sleep/Mood  - Melatonin 6mg at HS  - Psych rec Xanax 0.25mg PRN    #Skin  - Wound care recs--dry dressing over tiny surface area wound Rt groin, no need for skilled nursing care post dc   --Bruise left lower shin, resolving     #Pain Mgmt   - Tylenol PRN   - Gabapentin 300mg at HS     #GI/Bowel Mgmt/Hx of alternating bowel movements  - Pantoprazole  - PRN: Maalox   --Lactobacillus BID     #/Bladder Mgmt   -Spontaneously voiding   - UA (11/3) negative  - +UTI- Amoxicillin  completed  - 12/14---Elevated lactate--f/u repeat level and other labs     #FEN   #Dysphagia  - Diet - Minced & Moist  [CC]    - Dysphagia- SLP evaluation appreciated     #Health maintenance  - Folic Acid   - MVI  - Ocean nasal spray    # Difficulty with access to peripheral veins-- Blood draws from Left arm Medline --RUE F/U venous duplex 12/11    #Precautions / PROPHYLAXIS:   - Falls  - ortho: Weight bearing status: WBAT   - Lungs: Aspiration, Incentive Spirometer   - DVT PPX: Eliquis 5 mg BID   ----------------------------------------  12/11- --Labs CBC mild anemia, normal renal function, LFT elevated, downtrending overall unremarkable   12/14--* elevated lactate downtrending     Health maintenance--await response from insurance company for the expedited appeal regarding acute rehab treatment   ----------------------------------------  Liaison with other providers/agencies  12/8--Discussed with Respiratory, no need for repeat CT chest this time, last imaging wead 12/5, Resp will f/u with patient post d/c      IDT conference on 12/5  Social Work: Awaiting insurance response on clinicals  SLP: -- n/a  OT: Setup for eating/grooming. min assistance for ADLs, mod assistance for toileting  Shower transfers mod assistance x 1  Independent with bed mobility  Ambulating via light gate 100--120ft   Amb 65 ft with bariatric RW and WCF with min A. 6 inch step mod A x2.   Contact guard for stand pivot transfers  Goal--min assist with ADLs  RT--Engaging for activity planning and relaxation exercises  DME: Bariatric RW, WC, drop arm commode  Barriers: New fevers  TDD: 12/20 to home.  ----------------------------------------  OUTPATIENT/FOLLOW UP:    Trae Whitney  Pulmonary Disease  [address], [address] [place] [address]  Phone: [phone]  Fax: [phone]  Follow Up Time: 2 weeks    Ryanne Allen  Rheumatology  [address] [address]  Phone: [phone]  Fax: [phone]  Follow Up Time: 1 week    Kyle Pierce  Internal Medicine  [address] [place] [address], [address]  [place] [address]  Phone: [phone]  Fax: [phone]  Follow Up Time: 2 weeks    Addy Powers  Pulmonary Disease  [address] [facility], [address]  [place], [place] [address]  Phone: [phone]  Fax: [phone]  Follow Up Time:     Kwame Hawley  Critical Care Medicine  [address] [facility], [address]  [place] [address]  Phone: [phone]  Fax: [phone]  Follow Up Time:     Neena Borrego  Rheumatology  [address] [facility], [address]  [place] [address]  Phone: [phone]  Fax: [phone]  Follow Up Time:    [facility]  [facility] [address]  Scheduled Appointment: 09/13/2023  2:40 PM        Assessment/Plan:  ALIZA FOLEY is a 62 year old female with PMH of pAFIB and sciatica; who presented to [facility] ED with SOB, and was found to have groundglass opacities and interstitial lung disease. She was treated with empiric Vancomycin and Zosyn. She underwent a bronchoscopy with bronchoalveolar lavage and pulse steroids. She was given IV diuretics for increased pulmonary congestion, but her respiratory status continued to worsen.  On 9/13, she was transferred to [facility] for ECMO requirements. She was further treated with Plasmapheresis, Rituximab and high-dose steroids, with significant improvement. Her overall hospital course was complicated by thrombocytopenia (s/p several platelet transfusions), leukocytosis (infectious workup entirely negative- s/p Vanco and Ceftriaxone), AFIB with RVR (resolved with Metoprolol), dysphagia (requiring NGT), transaminitis (secondary to acute illness and hypotension),  non-occlusive RIJ DVT (started on Lovenox). Patient now admitted for a multidisciplinary rehab program. 10-05-23 @ 13:18    * DC home tomorrow 12/20   * Sustained functional improvement--increasing ambulatory distance to 160 ft with RW, and functional distance with manual WC   * Patient making private arrangements to facilitate transfer from care to  post dc   * Rheumatology labs ordered today and will f/u with Rheum for results post dc    #Interstitial Lung Disease and Mixed connective tissue disease  - Gait Instability, ADL impairments and Functional impairments: start Comprehensive Rehab Program of PT/OT/SLP - 3 hours a day, 5 days a week  - P&O as needed   - Non-specific Interstitial Lung Disease  - Requiring ECMO   - Improvement s/p Plasmapheresis, Rituximab and high- dose steroids   - Albuterol/Ipratropium Nebulizer every 6 hours  - Mepron 1500mg daily  - PO Medrol ( due to liver dysfunction): Plan will be to taper by ~20% every 2 weeks    Medrol   64mg x 2 weeks   56mg x 2 weeks  44mg x 2 weeks   36mg x 2 weeks - Follow up Outpatient   - Plan to wean 02 as tolerated, Continue tapering oxygen,  -  CT Chest noted 11/10---Resp f/u review  11/14, appreciated  They reports sustained improvement, clinical (normal oxy sats on R/A, sustained progress with oxy tapering), and radiological (no acute findings on recent CT Chest, rather residual chronic infiltrative diesease noted, with recommendation to repeat CT after Acute rehab treatment   Rheumat rec Dr Vasquez 12/14  - Taper to Medrol 28mg x 2 weeks then 20mg x 2 weeks, then can taper by 4mg qweekly - 16mg, 12mg, 8mg. Remain on 4mg/day until next clinic visit   - continue with PCP ppx with Mepron and GI ppx with PPI while on steroids   - no further Rituxan needed at present time  - pt will be going to [place] shortly following hospital discharge - will have labs done prior to leaving, paper lab orders provided to her today, will arrange rheum f/u with Dr Escobar once back from [place] in late March so can facilitate next Rituxan dosing.   - Provide 1 month of medrol on discharge, my office will continue rx until she is seen by Dr Escobar. Pt has also been advised to find a rheumatologist where she will be in case of intermediate emergencies   --12/18-- Rheumatology labs will be ordered today will f/u with Rheumat for results post dc    #Fever, resolved   - Recent UTI on Bactrim (since DCd)  - UCX with enterococcus faecalis  - 101.4 fever overnight (12/4-12/5)  - Lactate of 2.5- patient refused IV fluids   - Start Zosyn IVPB 12/6 (pt agreeable)  - RVP negative  - F/U blood cultures, CXR  - ID consult and Pulm re-eval (no respiratory symptoms)  - Pulm recs: continue to monitor   - ID recs: CT chest- stable (12/5)   - Repeat Lactate of 3.1 - agreeable to IV fluids- s/p 1000mL bolus   - BL LE doppler- negative   --UTI--treated   Lactate downtrending, no active infection, no need for continued monitoring     #Cankre sore--resolved    #Knee buckling--knee strength improving  - Back muscle strain with catching self on RW   - Lidocaine patch to back and BL deltoids    #Experiencing R mandible and occipital numbness, with associated hair loss- outpatient follow up with Rheumatology    #Posttnasal drip--ENT review 11/7, declined scope, continue flonase     #pAFIB/Rt Ij thrombus  - Metoprolol 25mg BID and lovenox  ---Eliquis 5mg BID - tolerating well   -- RUE venous duplex check for resolution of know DVT 12/11    #Chronic Tinnitus   - ENT review and recs appreciate, Patient already made ENT appointment for f/u    #Lymphedema both legs -chronic and Rt IJ thrombus  - ACE Wrap BL LEs  - BL LE doppler negative for DVT  - BL UE doppler with chronic thrombotic changes in RIJ     #Transaminitis --LFT Down trending   - Secondary to acute illness and hypotension at [facility]  - Outpatient follow up     #Leucocytosis--steroid related, continue monitoring   # Elevated Lactate--downtrending 3.6 on 12/14 will monitor, no signs of acute infection   Down trending, no need for further monitoring    #Neuropathy  - Gabapentin 300 mg BID, will consider increasing dose, increase to TID 11/10  --Work on motor strengthening, priority for UE as preferred by patient   - Vit b12 >2,000 in 9/2023  -- Rhuematology review 12/14 labs recommended, will order prior to dc and f/u with Rheumat    #Sleep/Mood  - Melatonin 6mg at HS  - Psych rec Xanax 0.25mg PRN    #Skin  - Wound care recs--dry dressing over tiny surface area wound Rt groin, no need for skilled nursing care post dc   --Bruise left lower shin, resolving     #Pain Mgmt   - Tylenol PRN   - Gabapentin 300mg at HS     #GI/Bowel Mgmt/Hx of alternating bowel movements  - Pantoprazole  - PRN: Maalox   --Lactobacillus BID     #/Bladder Mgmt   -Spontaneously voiding   - UA (11/3) negative  - +UTI- Amoxicillin  completed  - 12/14---Elevated lactate--f/u repeat level and other labs     #FEN   #Dysphagia  - Diet - Minced & Moist  [CC]    - Dysphagia- SLP evaluation appreciated     #Health maintenance  - Folic Acid   - MVI  - Ocean nasal spray    # Difficulty with access to peripheral veins-- Blood draws from Left arm Medline --RUE F/U venous duplex 12/11    #Precautions / PROPHYLAXIS:   - Falls  - ortho: Weight bearing status: WBAT   - Lungs: Aspiration, Incentive Spirometer   - DVT PPX: Eliquis 5 mg BID   ----------------------------------------  12/11- --Labs CBC mild anemia, normal renal function, LFT elevated, downtrending overall unremarkable   12/14--* elevated lactate downtrending     Health maintenance--await response from insurance company for the expedited appeal regarding acute rehab treatment   ----------------------------------------  Liaison with other providers/agencies  12/8--Discussed with Respiratory, no need for repeat CT chest this time, last imaging wead 12/5, Resp will f/u with patient post d/c      IDT conference on 12/5  Social Work: Awaiting insurance response on clinicals  SLP: -- n/a  OT: Setup for eating/grooming. min assistance for ADLs, mod assistance for toileting  Shower transfers mod assistance x 1  Independent with bed mobility  Ambulating via light gate 100--120ft   Amb 65 ft with bariatric RW and WCF with min A. 6 inch step mod A x2.   Contact guard for stand pivot transfers  Goal--min assist with ADLs  RT--Engaging for activity planning and relaxation exercises  DME: Bariatric RW, WC, drop arm commode  Barriers: New fevers  TDD: 12/20 to home.  ----------------------------------------  OUTPATIENT/FOLLOW UP:    Trae Whitney  Pulmonary Disease  [address], [address] [place] [address]  Phone: [phone]  Fax: [phone]  Follow Up Time: 2 weeks    Ryanne Allen  Rheumatology  [address] [address]  Phone: [phone]  Fax: [phone]  Follow Up Time: 1 week    Kyle Pierce  Internal Medicine  [address] [place] [address], [address] 5  [place] [address]  Phone: [phone]  Fax: [phone]  Follow Up Time: 2 weeks    Addy Powers  Pulmonary Disease  [address] [facility], [address]  [place], [place] [address]  Phone: [phone]  Fax: [phone]  Follow Up Time:     Kwame Hawley  Critical Care Medicine  [address] [facility], [address]  [place] [address]  Phone: [phone]  Fax: [phone]  Follow Up Time:     Neena Borrego  Rheumatology  [address] [facility], Floor 3  [place] [address]  Phone: [phone]  Fax: [phone]  Follow Up Time:    [facility]  [place] [address] [place]   Scheduled Appointment: 09/13/2023  2:40 PM       Non critical care  Time spent 32 mins, discussed patient's progress, disharge plan and care co ordnation Assessment/Plan:  ALIZA FOLEY is a 62 year old female with PMH of pAFIB and sciatica; who presented to [facility] ED with SOB, and was found to have groundglass opacities and interstitial lung disease. She was treated with empiric Vancomycin and Zosyn. She underwent a bronchoscopy with bronchoalveolar lavage and pulse steroids. She was given IV diuretics for increased pulmonary congestion, but her respiratory status continued to worsen.  On 9/13, she was transferred to [facility] for ECMO requirements. She was further treated with Plasmapheresis, Rituximab and high-dose steroids, with significant improvement. Her overall hospital course was complicated by thrombocytopenia (s/p several platelet transfusions), leukocytosis (infectious workup entirely negative- s/p Vanco and Ceftriaxone), AFIB with RVR (resolved with Metoprolol), dysphagia (requiring NGT), transaminitis (secondary to acute illness and hypotension),  non-occlusive RIJ DVT (started on Lovenox). Patient now admitted for a multidisciplinary rehab program. 10-05-23 @ 13:18    * DC home tomorrow 12/20   * Sustained functional improvement--increasing ambulatory distance to 160 ft with RW, and functional distance with manual WC   * Patient making private arrangements to facilitate transfer from care to  post dc   * Rheumatology labs ordered today and will f/u with Rheum for results post dc    #Interstitial Lung Disease and Mixed connective tissue disease  - Gait Instability, ADL impairments and Functional impairments: start Comprehensive Rehab Program of PT/OT/SLP - 3 hours a day, 5 days a week  - P&O as needed   - Non-specific Interstitial Lung Disease  - Requiring ECMO   - Improvement s/p Plasmapheresis, Rituximab and high- dose steroids   - Albuterol/Ipratropium Nebulizer every 6 hours  - Mepron 1500mg daily  - PO Medrol ( due to liver dysfunction): Plan will be to taper by ~20% every 2 weeks    Medrol   64mg x 2 weeks   56mg x 2 weeks  44mg x 2 weeks   36mg x 2 weeks - Follow up Outpatient   - Plan to wean 02 as tolerated, Continue tapering oxygen,  -  CT Chest noted 11/10---Resp f/u review  11/14, appreciated  They reports sustained improvement, clinical (normal oxy sats on R/A, sustained progress with oxy tapering), and radiological (no acute findings on recent CT Chest, rather residual chronic infiltrative diesease noted, with recommendation to repeat CT after Acute rehab treatment   Rheumat rec Dr Vasquez 12/14  - Taper to Medrol 28mg x 2 weeks then 20mg x 2 weeks, then can taper by 4mg qweekly - 16mg, 12mg, 8mg. Remain on 4mg/day until next clinic visit   - continue with PCP ppx with Mepron and GI ppx with PPI while on steroids   - no further Rituxan needed at present time  - pt will [place] going to [place] shortly following hospital discharge - will have labs done prior to leaving, paper lab orders provided to her today, will arrange rheum f/u with Dr Escobar once back from [place] in late March so can facilitate next Rituxan dosing.   - Provide 1 month of medrol on discharge, my office will continue rx until she is seen by Dr Escobar. Pt has also been advised to find a rheumatologist where she will be in case of intermediate emergencies   --12/18-- Rheumatology labs will be ordered today will f/u with Rheumat for results post dc    #Fever, resolved   - Recent UTI on Bactrim (since DCd)  - UCX with enterococcus faecalis  - 101.4 fever overnight (12/4-12/5)  - Lactate of 2.5- patient refused IV fluids   - Start Zosyn IVPB 12/6 (pt agreeable)  - RVP negative  - F/U blood cultures, CXR  - ID consult and Pulm re-eval (no respiratory symptoms)  - Pulm recs: continue to monitor   - ID recs: CT chest- stable (12/5)   - Repeat Lactate of 3.1 - agreeable to IV fluids- s/p 1000mL bolus   - BL LE doppler- negative   --UTI--treated   Lactate downtrending, no active infection, no need for continued monitoring     #Cankre sore--resolved    #Knee buckling--knee strength improving  - Back muscle strain with catching self on RW   - Lidocaine patch to back and BL deltoids    #Experiencing R mandible and occipital numbness, with associated hair loss- outpatient follow up with Rheumatology    #Posttnasal drip--ENT review 11/7, declined scope, continue flonase     #pAFIB/Rt Ij thrombus  - Metoprolol 25mg BID and lovenox  ---Eliquis 5mg BID - tolerating well   -- RUE venous duplex check for resolution of know DVT 12/11    #Chronic Tinnitus   - ENT review and recs appreciate, Patient already made ENT appointment for f/u    #Lymphedema both legs -chronic and Rt IJ thrombus  - ACE Wrap BL LEs  - BL LE doppler negative for DVT  - BL UE doppler with chronic thrombotic changes in RIJ     #Transaminitis --LFT Down trending   - Secondary to acute illness and hypotension at [facility]  - Outpatient follow up     #Leucocytosis--steroid related, continue monitoring   # Elevated Lactate--downtrending 3.6 on 12/14 will monitor, no signs of acute infection   Down trending, no need for further monitoring    #Neuropathy  - Gabapentin 300 mg BID, will consider increasing dose, increase to TID 11/10  --Work on motor strengthening, priority for UE as preferred by patient   - Vit b12 >2,000 in 9/2023  -- Rhuematology review 12/14 labs recommended, will order prior to dc and f/u with Rheumat    #Sleep/Mood  - Melatonin 6mg at HS  - Psych rec Xanax 0.25mg PRN    #Skin  - Wound care recs--dry dressing over tiny surface area wound Rt groin, no need for skilled nursing care post dc   --Bruise left lower shin, resolving     #Pain Mgmt   - Tylenol PRN   - Gabapentin 300mg at HS     #GI/Bowel Mgmt/Hx of alternating bowel movements  - Pantoprazole  - PRN: Maalox   --Lactobacillus BID     #/Bladder Mgmt   -Spontaneously voiding   - UA (11/3) negative  - +UTI- Amoxicillin  completed  - 12/14---Elevated lactate--f/u repeat level and other labs     #FEN   #Dysphagia  - Diet - Minced & Moist  [CC]    - Dysphagia- SLP evaluation appreciated     #Health maintenance  - Folic Acid   - MVI  - Ocean nasal spray    # Difficulty with access to peripheral veins-- Blood draws from Left arm Medline --RUE F/U venous duplex 12/11    #Precautions / PROPHYLAXIS:   - Falls  - ortho: Weight bearing status: WBAT   - Lungs: Aspiration, Incentive Spirometer   - DVT PPX: Eliquis 5 mg BID   ----------------------------------------  12/11- --Labs CBC mild anemia, normal renal function, LFT elevated, downtrending overall unremarkable   12/14--* elevated lactate downtrending     Health maintenance--await response from insurance company for the expedited appeal regarding acute rehab treatment   ----------------------------------------  Liaison with other providers/agencies  12/8--Discussed with Respiratory, no need for repeat CT chest this time, last imaging wead 12/5, Resp will f/u with patient post d/c      IDT conference on 12/5  Social Work: Awaiting insurance response on clinicals  SLP: -- n/a  OT: Setup for eating/grooming. min assistance for ADLs, mod assistance for toileting  Shower transfers mod assistance x 1  Independent with bed mobility  Ambulating via light gate 100--120ft   Amb 65 ft with bariatric RW and WCF with min A. 6 inch step mod A x2.   Contact guard for stand pivot transfers  Goal--min assist with ADLs  RT--Engaging for activity planning and relaxation exercises  DME: Bariatric RW, WC, drop arm commode  Barriers: New fevers  TDD: 12/20 to home.  ----------------------------------------  OUTPATIENT/FOLLOW UP:    Trae Whitney  Pulmonary Disease  [address], [address] [place] [address]  Phone: [phone]  Fax: [phone]  Follow Up Time: 2 weeks    Ryanne Allen  Rheumatology  [address] [address]  Phone: [phone]  Fax: [phone]  Follow Up Time: 1 week    Kyle Pierce  Internal Medicine  [address] [place] [address], [address] 5  [place] [address]  Phone: [phone]  Fax: [phone]  Follow Up Time: 2 weeks    Addy Powers  Pulmonary Disease  [address] [facility], [address]  [place], [place] [address]  Phone: [phone]  Fax: [phone]  Follow Up Time:     Kwame Hawley  Critical Care Medicine  [address] [facility], [address]  [place] [address]  Phone: [phone]  Fax: [phone]  Follow Up Time:     Neena Borrego  Rheumatology  [address] [facility], Floor 3  [place] [address]  Phone: [phone]  Fax: [phone]  Follow Up Time:    [facility]  [place] [address] [place]   Scheduled Appointment: 09/13/2023  2:40 PM       Non critical care  Time spent 32 mins, discussed patient's progress, disharge plan and care co ordnation Assessment/Plan:  ALIZA FOLEY is a 62 year old female with PMH of pAFIB and sciatica; who presented to [facility] ED with SOB, and was found to have groundglass opacities and interstitial lung disease. She was treated with empiric Vancomycin and Zosyn. She underwent a bronchoscopy with bronchoalveolar lavage and pulse steroids. She was given IV diuretics for increased pulmonary congestion, but her respiratory status continued to worsen.  On 9/13, she was transferred to [facility] for ECMO requirements. She was further treated with Plasmapheresis, Rituximab and high-dose steroids, with significant improvement. Her overall hospital course was complicated by thrombocytopenia (s/p several platelet transfusions), leukocytosis (infectious workup entirely negative- s/p Vanco and Ceftriaxone), AFIB with RVR (resolved with Metoprolol), dysphagia (requiring NGT), transaminitis (secondary to acute illness and hypotension),  non-occlusive RIJ DVT (started on Lovenox). Patient now admitted for a multidisciplinary rehab program. 10-05-23 @ 13:18    * DC home tomorrow 12/20   * Sustained functional improvement--increasing ambulatory distance to 160 ft with RW, and functional distance with manual WC   * Patient making private arrangements to facilitate transfer from care to  post dc   * Rheumatology labs ordered today and will f/u with Rheum for results post dc    #Interstitial Lung Disease and Mixed connective tissue disease  - Gait Instability, ADL impairments and Functional impairments: start Comprehensive Rehab Program of PT/OT/SLP - 3 hours a day, 5 days a week  - P&O as needed   - Non-specific Interstitial Lung Disease  - Requiring ECMO   - Improvement s/p Plasmapheresis, Rituximab and high- dose steroids   - Albuterol/Ipratropium Nebulizer every 6 hours  - Mepron 1500mg daily  - PO Medrol ( due to liver dysfunction): Plan will be to taper by ~20% every 2 weeks    Medrol   64mg x 2 weeks   56mg x 2 weeks  44mg x 2 weeks   36mg x 2 weeks - Follow up Outpatient   - Plan to wean 02 as tolerated, Continue tapering oxygen,  -  CT Chest noted 11/10---Resp f/u review  11/14, appreciated  They reports sustained improvement, clinical (normal oxy sats on R/A, sustained progress with oxy tapering), and radiological (no acute findings on recent CT Chest, rather residual chronic infiltrative diesease noted, with recommendation to repeat CT after Acute rehab treatment   Rheumat rec Dr Vasquez 12/14  - Taper to Medrol 28mg x 2 weeks then 20mg x 2 weeks, then can taper by 4mg qweekly - 16mg, 12mg, 8mg. Remain on 4mg/day until next clinic visit   - continue with PCP ppx with Mepron and GI ppx with PPI while on steroids   - no further Rituxan needed at present time  - [place] will [place] going [place] shortly following hospital discharge - will have labs done prior to leaving, paper lab orders provided to her today, will arrange rheum f/u with Dr Escobar once back from [place] in late March so can facilitate next Rituxan dosing.   - Provide 1 month of medrol on discharge, my office will continue rx until she is seen by Dr Escobar. Pt has also been advised to find a rheumatologist where she will be in case of intermediate emergencies   --12/18-- Rheumatology labs will be ordered today will f/u with Rheumat for results post dc    #Fever, resolved   - Recent UTI on Bactrim (since DCd)  - UCX with enterococcus faecalis  - 101.4 fever overnight (12/4-12/5)  - Lactate of 2.5- patient refused IV fluids   - Start Zosyn IVPB 12/6 (pt agreeable)  - RVP negative  - F/U blood cultures, CXR  - ID consult and Pulm re-eval (no respiratory symptoms)  - Pulm recs: continue to monitor   - ID recs: CT chest- stable (12/5)   - Repeat Lactate of 3.1 - agreeable to IV fluids- s/p 1000mL bolus   - BL LE doppler- negative   --UTI--treated   Lactate downtrending, no active infection, no need for continued monitoring     #Cankre sore--resolved    #Knee buckling--knee strength improving  - Back muscle strain with catching self on RW   - Lidocaine patch to back and BL deltoids    #Experiencing R mandible and occipital numbness, with associated hair loss- outpatient follow up with Rheumatology    #Posttnasal drip--ENT review 11/7, declined scope, continue flonase     #pAFIB/Rt Ij thrombus  - Metoprolol 25mg BID and lovenox  ---Eliquis 5mg BID - tolerating well   -- RUE venous duplex check for resolution of know DVT 12/11    #Chronic Tinnitus   - ENT review and recs appreciate, Patient already made ENT appointment for f/u    #Lymphedema both legs -chronic and Rt IJ thrombus  - ACE Wrap BL LEs  - BL LE doppler negative for DVT  - BL UE doppler with chronic thrombotic changes in RIJ     #Transaminitis --LFT Down trending   - Secondary to acute illness and hypotension at [facility]  - Outpatient follow up     #Leucocytosis--steroid related, continue monitoring   # Elevated Lactate--downtrending 3.6 on 12/14 will monitor, no signs of acute infection   Down trending, no need for further monitoring    #Neuropathy  - Gabapentin 300 mg BID, will consider increasing dose, increase to TID 11/10  --Work on motor strengthening, priority for UE as preferred by patient   - Vit b12 >2,000 in 9/2023  -- Rhuematology review 12/14 labs recommended, will order prior to dc and f/u with Rheumat    #Sleep/Mood  - Melatonin 6mg at HS  - Psych rec Xanax 0.25mg PRN    #Skin  - Wound care recs--dry dressing over tiny surface area wound Rt groin, no need for skilled nursing care post dc   --Bruise left lower shin, resolving     #Pain Mgmt   - Tylenol PRN   - Gabapentin 300mg at HS     #GI/Bowel Mgmt/Hx of alternating bowel movements  - Pantoprazole  - PRN: Maalox   --Lactobacillus BID     #/Bladder Mgmt   -Spontaneously voiding   - UA (11/3) negative  - +UTI- Amoxicillin  completed  - 12/14---Elevated lactate--f/u repeat level and other labs     #FEN   #Dysphagia  - Diet - Minced & Moist  [CC]    - Dysphagia- SLP evaluation appreciated     #Health maintenance  - Folic Acid   - MVI  - Ocean nasal spray    # Difficulty with access to peripheral veins-- Blood draws from Left arm Medline --RUE F/U venous duplex 12/11    #Precautions / PROPHYLAXIS:   - Falls  - ortho: Weight bearing status: WBAT   - Lungs: Aspiration, Incentive Spirometer   - DVT PPX: Eliquis 5 mg BID   ----------------------------------------  12/11- --Labs CBC mild anemia, normal renal function, LFT elevated, downtrending overall unremarkable   12/14--* elevated lactate downtrending     Health maintenance--await response from insurance company for the expedited appeal regarding acute rehab treatment   ----------------------------------------  Liaison with other providers/agencies  12/8--Discussed with Respiratory, no need for repeat CT chest this time, last imaging wead 12/5, Resp will f/u with patient post d/c  ----------------------------------------  IDT conference on 12/19  Social Work: --   SLP: --  OT: Setup for eating/grooming. Sup for UBD. CG for LBD/toileting. Min A for shower & toilet transfer.   PT: CG/CS for all. 1 6 inch step with mod A x2. Car transfers with CGA.   RT: --  DME: WC     Barriers: --   TDD: 12/20 to home.  ----------------------------------------  OUTPATIENT/FOLLOW UP:    Trae Whitney  Pulmonary Disease  [address], [address] [place] [address]  Phone: [phone]  Fax: [phone]  Follow Up Time: 2 weeks    Ryanne Allen  Rheumatology  [address] [address]  Phone: [phone]  Fax: [phone]  Follow Up Time: 1 week    Kyle Pierce  Internal Medicine  [address] [place] [address], [address]  [place] [address]  Phone: [phone]  Fax: [phone]  Follow Up Time: 2 weeks    Addy Powers  Pulmonary Disease  [address] [facility], [address]  [place], [place] [address]  Phone: [phone]  Fax: [phone]  Follow Up Time:     Kwame Hawley  Critical Care Medicine  [address] [facility], [address]  [place] [address]  Phone: [phone]  Fax: [phone]  Follow Up Time:     Neena Borrego  Rheumatology  [address] [facility], Floor 3  [place] [id number]  Phone: [phone]  Fax: [phone]  Follow Up Time:    [facility]  [address]  Scheduled Appointment: 09/13/2023  2:40 PM       Non critical care  Time spent 32 mins, discussed patient's progress, disharge plan and care co ordnation Assessment/Plan:  ALIZA FOLEY is a 62 year old female with PMH of pAFIB and sciatica; who presented to [facility] ED with SOB, and was found to have groundglass opacities and interstitial lung disease. She was treated with empiric Vancomycin and Zosyn. She underwent a bronchoscopy with bronchoalveolar lavage and pulse steroids. She was given IV diuretics for increased pulmonary congestion, but her respiratory status continued to worsen.  On 9/13, she was transferred to [facility] for ECMO requirements. She was further treated with Plasmapheresis, Rituximab and high-dose steroids, with significant improvement. Her overall hospital course was complicated by thrombocytopenia (s/p several platelet transfusions), leukocytosis (infectious workup entirely negative- s/p Vanco and Ceftriaxone), AFIB with RVR (resolved with Metoprolol), dysphagia (requiring NGT), transaminitis (secondary to acute illness and hypotension),  non-occlusive RIJ DVT (started on Lovenox). Patient now admitted for a multidisciplinary rehab program. 10-05-23 @ 13:18    * DC home tomorrow 12/20   * Sustained functional improvement--increasing ambulatory distance to 160 ft with RW, and functional distance with manual WC   * Patient making private arrangements to facilitate transfer from care to  post dc   * Rheumatology labs ordered today and will f/u with Rheum for results post dc    #Interstitial Lung Disease and Mixed connective tissue disease  - Gait Instability, ADL impairments and Functional impairments: start Comprehensive Rehab Program of PT/OT/SLP - 3 hours a day, 5 days a week  - P&O as needed   - Non-specific Interstitial Lung Disease  - Requiring ECMO   - Improvement s/p Plasmapheresis, Rituximab and high- dose steroids   - Albuterol/Ipratropium Nebulizer every 6 hours  - Mepron 1500mg daily  - PO Medrol ( due to liver dysfunction): Plan will be to taper by ~20% every 2 weeks    Medrol   64mg x 2 weeks   56mg x 2 weeks  44mg x 2 weeks   36mg x 2 weeks - Follow up Outpatient   - Plan to wean 02 as tolerated, Continue tapering oxygen,  -  CT Chest noted 11/10---Resp f/u review  11/14, appreciated  They reports sustained improvement, clinical (normal oxy sats on R/A, sustained progress with oxy tapering), and radiological (no acute findings on recent CT Chest, rather residual chronic infiltrative diesease noted, with recommendation to repeat CT after Acute rehab treatment   Rheumat rec Dr Vasquez 12/14  - Taper to Medrol 28mg x 2 weeks then 20mg x 2 weeks, then can taper by 4mg qweekly - 16mg, 12mg, 8mg. Remain on 4mg/day until next clinic visit   - continue with PCP ppx with Mepron and GI ppx with PPI while on steroids   - no further Rituxan needed at present time  - pt will be going to [place] shortly following hospital discharge - will have labs done prior to leaving, paper lab orders provided to her today, will arrange rheum f/u with Dr Escobar once back from [place] in late March so can facilitate next Rituxan dosing.   - Provide 1 month of medrol on discharge, my office will continue rx until she is seen by Dr Escobar. Pt has also been advised to find a rheumatologist where she will be in case of intermediate emergencies   --12/18-- Rheumatology labs will be ordered today will f/u with Rheumat for results post dc    #Fever, resolved   - Recent UTI on Bactrim (since DCd)  - UCX with enterococcus faecalis  - 101.4 fever overnight (12/4-12/5)  - Lactate of 2.5- patient refused IV fluids   - Start Zosyn IVPB 12/6 (pt agreeable)  - RVP negative  - F/U blood cultures, CXR  - ID consult and Pulm re-eval (no respiratory symptoms)  - Pulm recs: continue to monitor   - ID recs: CT chest- stable (12/5)   - Repeat Lactate of 3.1 - agreeable to IV fluids- s/p 1000mL bolus   - BL LE doppler- negative   --UTI--treated   Lactate downtrending, no active infection, no need for continued monitoring     #Cankre sore--resolved    #Knee buckling--knee strength improving  - Back muscle strain with catching self on RW   - Lidocaine patch to back and BL deltoids    #Experiencing R mandible and occipital numbness, with associated hair loss- outpatient follow up with Rheumatology    #Posttnasal drip--ENT review 11/7, declined scope, continue flonase     #pAFIB/Rt Ij thrombus  - Metoprolol 25mg BID and lovenox  ---Eliquis 5mg BID - tolerating well   -- RUE venous duplex check for resolution of know DVT 12/11    #Chronic Tinnitus   - ENT review and recs appreciate, Patient already made ENT appointment for f/u    #Lymphedema both legs -chronic and Rt IJ thrombus  - ACE Wrap BL LEs  - BL LE doppler negative for DVT  - BL UE doppler with chronic thrombotic changes in RIJ     #Transaminitis --LFT Down trending   - Secondary to acute illness and hypotension at [facility]  - Outpatient follow up     #Leucocytosis--steroid related, continue monitoring   # Elevated Lactate--downtrending 3.6 on 12/14 will monitor, no signs of acute infection   Down trending, no need for further monitoring    #Neuropathy  - Gabapentin 300 mg BID, will consider increasing dose, increase to TID 11/10  --Work on motor strengthening, priority for UE as preferred by patient   - Vit b12 >2,000 in 9/2023  -- Rhuematology review 12/14 labs recommended, will order prior to dc and f/u with Rheumat    #Sleep/Mood  - Melatonin 6mg at HS  - Psych rec Xanax 0.25mg PRN    #Skin  - Wound care recs--dry dressing over tiny surface area wound Rt groin, no need for skilled nursing care post dc   --Bruise left lower shin, resolving     #Pain Mgmt   - Tylenol PRN   - Gabapentin 300mg at HS     #GI/Bowel Mgmt/Hx of alternating bowel movements  - Pantoprazole  - PRN: Maalox   --Lactobacillus BID     #/Bladder Mgmt   -Spontaneously voiding   - UA (11/3) negative  - +UTI- Amoxicillin  completed  - 12/14---Elevated lactate--f/u repeat level and other labs     #FEN   #Dysphagia  - Diet - Minced & Moist  [CC]    - Dysphagia- SLP evaluation appreciated     #Health maintenance  - Folic Acid   - MVI  - Ocean nasal spray    # Difficulty with access to peripheral veins-- Blood draws from Left arm Medline --RUE F/U venous duplex 12/11    #Precautions / PROPHYLAXIS:   - Falls  - ortho: Weight bearing status: WBAT   - Lungs: Aspiration, Incentive Spirometer   - DVT PPX: Eliquis 5 mg BID   ----------------------------------------  12/11- --Labs CBC mild anemia, normal renal function, LFT elevated, downtrending overall unremarkable   12/14--* elevated lactate downtrending     Health maintenance--await response from insurance company for the expedited appeal regarding acute rehab treatment   ----------------------------------------  Liaison with other providers/agencies  12/8--Discussed with Respiratory, no need for repeat CT chest this time, last imaging wead 12/5, Resp will f/u with patient post d/c  ----------------------------------------  IDT conference on 12/19  Social Work: --   SLP: --  OT: Setup for eating/grooming. Sup for UBD. CG for LBD/toileting. Min A for shower & toilet transfer.   PT: CG/CS for all. 1 6 inch step with mod A x2. Car transfers with CGA.   RT: --  DME: WC     Barriers: --   TDD: 12/20 to home.  ----------------------------------------  OUTPATIENT/FOLLOW UP:    Trae Whitney  Pulmonary Disease  [address], [address] [place] [address]  Phone: [phone]  Fax: [phone]  Follow Up Time: 2 weeks    Ryanne Allen  Rheumatology  [address] [address]  Phone: [phone]  Fax: [phone]  Follow Up Time: 1 week    Kyle Pierce  Internal Medicine  [address] [place] [address], [address] 5  [place] [address]  Phone: [phone]  Fax: [phone]  Follow Up Time: 2 weeks    Addy Powers  Pulmonary Disease  [address] [facility], [address]  [place], [place] [address]  Phone: [phone]  Fax: [phone]  Follow Up Time:     Kwame Hawley  Critical Care Medicine  [address] [facility], [address]  [place] [address]  Phone: [phone]  Fax: [phone]  Follow Up Time:     Neena Borrego  Rheumatology  [address] [facility], Floor 3  [place] [id number]  Phone: [phone]  Fax: [phone]  Follow Up Time:    [facility]  [address]  Scheduled Appointment: 09/13/2023  2:40 PM       Non critical care  Time spent 32 mins, discussed patient's progress, disharge plan and care co ordnation Assessment/Plan:  ALIZA FOLEY is a 62 year old female with PMH of pAFIB and sciatica; who presented to [facility] ED with SOB, and was found to have groundglass opacities and interstitial lung disease. She was treated with empiric Vancomycin and Zosyn. She underwent a bronchoscopy with bronchoalveolar lavage and pulse steroids. She was given IV diuretics for increased pulmonary congestion, but her respiratory status continued to worsen.  On 9/13, she was transferred to [facility] for ECMO requirements. She was further treated with Plasmapheresis, Rituximab and high-dose steroids, with significant improvement. Her overall hospital course was complicated by thrombocytopenia (s/p several platelet transfusions), leukocytosis (infectious workup entirely negative- s/p Vanco and Ceftriaxone), AFIB with RVR (resolved with Metoprolol), dysphagia (requiring NGT), transaminitis (secondary to acute illness and hypotension),  non-occlusive RIJ DVT (started on Lovenox). Patient now admitted for a multidisciplinary rehab program. 10-05-23 @ 13:18    * DC home tomorrow 12/20   * Sustained functional improvement--increasing ambulatory distance to 160 ft with RW, and functional distance with manual WC   * Patient making private arrangements to facilitate transfer from care to  post dc   * Rheumatology labs ordered today and will f/u with Rheum for results post dc    #Interstitial Lung Disease and Mixed connective tissue disease  - Gait Instability, ADL impairments and Functional impairments: start Comprehensive Rehab Program of PT/OT/SLP - 3 hours a day, 5 days a week  - P&O as needed   - Non-specific Interstitial Lung Disease  - Requiring ECMO   - Improvement s/p Plasmapheresis, Rituximab and high- dose steroids   - Albuterol/Ipratropium Nebulizer every 6 hours  - Mepron 1500mg daily (failed Bactrim at prior facility.)  - PO Medrol ( due to liver dysfunction): Plan will be to taper by ~20% every 2 weeks    Medrol   64mg x 2 weeks   56mg x 2 weeks  44mg x 2 weeks   36mg x 2 weeks - Follow up Outpatient   - Plan to wean 02 as tolerated, Continue tapering oxygen,  -  CT Chest noted 11/10---Resp f/u review  11/14, appreciated  They reports sustained improvement, clinical (normal oxy sats on R/A, sustained progress with oxy tapering), and radiological (no acute findings on recent CT Chest, rather residual chronic infiltrative diesease noted, with recommendation to repeat CT after Acute rehab treatment   Rheumat rec Dr Vasquez 12/14  - Taper to Medrol 28mg x 2 weeks then 20mg x 2 weeks, then can taper by 4mg qweekly - 16mg, 12mg, 8mg. Remain on 4mg/day until next clinic visit   - continue with PCP ppx with Mepron and GI ppx with PPI while on steroids   - no further Rituxan needed at present time  - pt will be going to [place] shortly following hospital discharge - will have labs done prior to leaving, paper lab orders provided to her today, will arrange rheum f/u with Dr Escobar once back from [place] in late March so can facilitate next Rituxan dosing.   - Provide 1 month of medrol on discharge, my office will continue rx until she is seen by Dr Escobar. Pt has also been advised to find a rheumatologist where she will be in case of intermediate emergencies   --12/18-- Rheumatology labs will be ordered today will f/u with Rheumat for results post dc    #Fever, resolved   - Recent UTI on Bactrim (since DCd)  - UCX with enterococcus faecalis  - 101.4 fever overnight (12/4-12/5)  - Lactate of 2.5- patient refused IV fluids   - Start Zosyn IVPB 12/6 (pt agreeable)  - RVP negative  - F/U blood cultures, CXR  - ID consult and Pulm re-eval (no respiratory symptoms)  - Pulm recs: continue to monitor   - ID recs: CT chest- stable (12/5)   - Repeat Lactate of 3.1 - agreeable to IV fluids- s/p 1000mL bolus   - BL LE doppler- negative   --UTI--treated   Lactate downtrending, no active infection, no need for continued monitoring     #Cankre sore--resolved    #Knee buckling--knee strength improving  - Back muscle strain with catching self on RW   - Lidocaine patch to back and BL deltoids    #Experiencing R mandible and occipital numbness, with associated hair loss- outpatient follow up with Rheumatology    #Posttnasal drip--ENT review 11/7, declined scope, continue flonase     #pAFIB/Rt Ij thrombus  - Metoprolol 25mg BID and lovenox  ---Eliquis 5mg BID - tolerating well   -- RUE venous duplex check for resolution of know DVT 12/11    #Chronic Tinnitus   - ENT review and recs appreciate, Patient already made ENT appointment for f/u    #Lymphedema both legs -chronic and Rt IJ thrombus  - ACE Wrap BL LEs  - BL LE doppler negative for DVT  - BL UE doppler with chronic thrombotic changes in RIJ     #Transaminitis --LFT Down trending   - Secondary to acute illness and hypotension at [facility]  - Outpatient follow up     #Leucocytosis--steroid related, continue monitoring   # Elevated Lactate--downtrending 3.6 on 12/14 will monitor, no signs of acute infection   Down trending, no need for further monitoring    #Neuropathy  - Gabapentin 300 mg BID, will consider increasing dose, increase to TID 11/10  --Work on motor strengthening, priority for UE as preferred by patient   - Vit b12 >2,000 in 9/2023  -- Rhuematology review 12/14 labs recommended, will order prior to dc and f/u with Rheumat    #Sleep/Mood  - Melatonin 6mg at HS  - Psych rec Xanax 0.25mg PRN    #Skin  - Wound care recs--dry dressing over tiny surface area wound Rt groin, no need for skilled nursing care post dc   --Bruise left lower shin, resolving     #Pain Mgmt   - Tylenol PRN   - Gabapentin 300mg at HS     #GI/Bowel Mgmt/Hx of alternating bowel movements  - Pantoprazole  - PRN: Maalox   --Lactobacillus BID     #/Bladder Mgmt   -Spontaneously voiding   - UA (11/3) negative  - +UTI- Amoxicillin  completed  - 12/14---Elevated lactate--f/u repeat level and other labs     #FEN   #Dysphagia  - Diet - Minced & Moist  [CC]    - Dysphagia- SLP evaluation appreciated     #Health maintenance  - Folic Acid   - MVI  - Ocean nasal spray    # Difficulty with access to peripheral veins-- Blood draws from Left arm Medline --RUE F/U venous duplex 12/11    #Precautions / PROPHYLAXIS:   - Falls  - ortho: Weight bearing status: WBAT   - Lungs: Aspiration, Incentive Spirometer   - DVT PPX: Eliquis 5 mg BID   ----------------------------------------  12/11- --Labs CBC mild anemia, normal renal function, LFT elevated, downtrending overall unremarkable   12/14--* elevated lactate downtrending     Health maintenance--await response from insurance company for the expedited appeal regarding acute rehab treatment   ----------------------------------------  Liaison with other providers/agencies  12/8--Discussed with Respiratory, no need for repeat CT chest this time, last imaging wead 12/5, Resp will f/u with patient post d/c  ----------------------------------------  IDT conference on 12/19  Social Work: --   SLP: --  OT: Setup for eating/grooming. Sup for UBD. CG for LBD/toileting. Min A for shower & toilet transfer.   PT: CG/CS for all. 1 6 inch step with mod A x2. Car transfers with CGA.   RT: --  DME: WC     Barriers: --   TDD: 12/20 to home.  ----------------------------------------  OUTPATIENT/FOLLOW UP:    Trae Whitney  Pulmonary Disease  [address], [address] [place] [address]  Phone: [phone]  Fax: [phone]  Follow Up Time: 2 weeks    Ryanne Allen  Rheumatology  [address] [address]  Phone: [phone]  Fax: [phone]  Follow Up Time: 1 week    Kyle Pierce  Internal Medicine  [address] [place] [address], [address] 5  [place] [address]  Phone: [phone]  Fax: [phone]  Follow Up Time: 2 weeks    Addy Powers  Pulmonary Disease  [address] [facility], [address]  [place], [place] [id number]  Phone: [phone]  Fax: [phone]  Follow Up Time:     Kwame Hawley  Critical Care Medicine  [address] [facility], [address]  [place] [address]  Phone: [phone]  Fax: [phone]  Follow Up Time:     Neena Borrego  Rheumatology  [address] [facility], Floor 3  [place] [address]  Phone: [phone]  Fax: [phone]  Follow Up Time:    [facility]  [address]  Scheduled Appointment: 09/13/2023  2:40 PM       Non critical care  Time spent 32 mins, discussed patient's progress, disharge plan and care co ordnation Assessment/Plan:  ALIZA FOLEY is a 62 year old female with PMH of pAFIB and sciatica; who presented to [facility] ED with SOB, and was found to have groundglass opacities and interstitial lung disease. She was treated with empiric Vancomycin and Zosyn. She underwent a bronchoscopy with bronchoalveolar lavage and pulse steroids. She was given IV diuretics for increased pulmonary congestion, but her respiratory status continued to worsen.  On 9/13, she was transferred to [facility] for ECMO requirements. She was further treated with Plasmapheresis, Rituximab and high-dose steroids, with significant improvement. Her overall hospital course was complicated by thrombocytopenia (s/p several platelet transfusions), leukocytosis (infectious workup entirely negative- s/p Vanco and Ceftriaxone), AFIB with RVR (resolved with Metoprolol), dysphagia (requiring NGT), transaminitis (secondary to acute illness and hypotension),  non-occlusive RIJ DVT (started on Lovenox). Patient now admitted for a multidisciplinary rehab program. 10-05-23 @ 13:18    * DC home tomorrow 12/20   * Sustained functional improvement--increasing ambulatory distance to 160 ft with RW, and functional distance with manual WC   * Patient making private arrangements to facilitate transfer from care to  post dc   * Rheumatology labs ordered today and will f/u with Rheum for results post dc    #Interstitial Lung Disease and Mixed connective tissue disease  - Gait Instability, ADL impairments and Functional impairments: start Comprehensive Rehab Program of PT/OT/SLP - 3 hours a day, 5 days a week  - P&O as needed   - Non-specific Interstitial Lung Disease  - Requiring ECMO   - Improvement s/p Plasmapheresis, Rituximab and high- dose steroids   - Albuterol/Ipratropium Nebulizer every 6 hours  - Mepron 1500mg daily (failed Bactrim at prior facility.)  - PO Medrol ( due to liver dysfunction): Plan will be to taper by ~20% every 2 weeks    Medrol   64mg x 2 weeks   56mg x 2 weeks  44mg x 2 weeks   36mg x 2 weeks - Follow up Outpatient   - Plan to wean 02 as tolerated, Continue tapering oxygen,  -  CT Chest noted 11/10---Resp f/u review  11/14, appreciated  They reports sustained improvement, clinical (normal oxy sats on R/A, sustained progress with oxy tapering), and radiological (no acute findings on recent CT Chest, rather residual chronic infiltrative diesease noted, with recommendation to repeat CT after Acute rehab treatment   Rheumat rec Dr Vasquez 12/14  - Taper to Medrol 28mg x 2 weeks then 20mg x 2 weeks, then can taper by 4mg qweekly - 16mg, 12mg, 8mg. Remain on 4mg/day until next clinic visit   - continue with PCP ppx with Mepron and GI ppx with PPI while on steroids   - no further Rituxan needed at present time  - pt will be going to [place] shortly following hospital discharge - will have labs done prior to leaving, paper lab orders provided to her today, will arrange rheum f/u with Dr Escobar once back from [place] in late March so can facilitate next Rituxan dosing.   - Provide 1 month of medrol on discharge, my office will continue rx until she is seen by Dr Escobar. Pt has also been advised to find a rheumatologist where she will be in case of intermediate emergencies   --12/18-- Rheumatology labs will be ordered today will f/u with Rheumat for results post dc    #Fever, resolved   - Recent UTI on Bactrim (since DCd)  - UCX with enterococcus faecalis  - 101.4 fever overnight (12/4-12/5)  - Lactate of 2.5- patient refused IV fluids   - Start Zosyn IVPB 12/6 (pt agreeable)  - RVP negative  - F/U blood cultures, CXR  - ID consult and Pulm re-eval (no respiratory symptoms)  - Pulm recs: continue to monitor   - ID recs: CT chest- stable (12/5)   - Repeat Lactate of 3.1 - agreeable to IV fluids- s/p 1000mL bolus   - BL LE doppler- negative   --UTI--treated   Lactate downtrending, no active infection, no need for continued monitoring     #Cankre sore--resolved    #Knee buckling--knee strength improving  - Back muscle strain with catching self on RW   - Lidocaine patch to back and BL deltoids    #Experiencing R mandible and occipital numbness, with associated hair loss- outpatient follow up with Rheumatology    #Posttnasal drip--ENT review 11/7, declined scope, continue flonase     #pAFIB/Rt Ij thrombus  - Metoprolol 25mg BID and lovenox  ---Eliquis 5mg BID - tolerating well   -- RUE venous duplex check for resolution of know DVT 12/11    #Chronic Tinnitus   - ENT review and recs appreciate, Patient already made ENT appointment for f/u    #Lymphedema both legs -chronic and Rt IJ thrombus  - ACE Wrap BL LEs  - BL LE doppler negative for DVT  - BL UE doppler with chronic thrombotic changes in RIJ     #Transaminitis --LFT Down trending   - Secondary to acute illness and hypotension at [facility]  - Outpatient follow up     #Leucocytosis--steroid related, continue monitoring   # Elevated Lactate--downtrending 3.6 on 12/14 will monitor, no signs of acute infection   Down trending, no need for further monitoring    #Neuropathy  - Gabapentin 300 mg BID, will consider increasing dose, increase to TID 11/10  --Work on motor strengthening, priority for UE as preferred by patient   - Vit b12 >2,000 in 9/2023  -- Rhuematology review 12/14 labs recommended, will order prior to dc and f/u with Rheumat    #Sleep/Mood  - Melatonin 6mg at HS  - Psych rec Xanax 0.25mg PRN    #Skin  - Wound care recs--dry dressing over tiny surface area wound Rt groin, no need for skilled nursing care post dc   --Bruise left lower shin, resolving     #Pain Mgmt   - Tylenol PRN   - Gabapentin 300mg at HS     #GI/Bowel Mgmt/Hx of alternating bowel movements  - Pantoprazole  - PRN: Maalox   --Lactobacillus BID     #/Bladder Mgmt   -Spontaneously voiding   - UA (11/3) negative  - +UTI- Amoxicillin  completed  - 12/14---Elevated lactate--f/u repeat level and other labs     #FEN   #Dysphagia  - Diet - Minced & Moist  [CC]    - Dysphagia- SLP evaluation appreciated     #Health maintenance  - Folic Acid   - MVI  - Ocean nasal spray    # Difficulty with access to peripheral veins-- Blood draws from Left arm Medline --RUE F/U venous duplex 12/11    #Precautions / PROPHYLAXIS:   - Falls  - ortho: Weight bearing status: WBAT   - Lungs: Aspiration, Incentive Spirometer   - DVT PPX: Eliquis 5 mg BID   ----------------------------------------  12/11- --Labs CBC mild anemia, normal renal function, LFT elevated, downtrending overall unremarkable   12/14--* elevated lactate downtrending     Health maintenance--await response from insurance company for the expedited appeal regarding acute rehab treatment   ----------------------------------------  Liaison with other providers/agencies  12/8--Discussed with Respiratory, no need for repeat CT chest this time, last imaging wead 12/5, Resp will f/u with patient post d/c  ----------------------------------------  IDT conference on 12/19  Social Work: --   SLP: --  OT: Setup for eating/grooming. Sup for UBD. CG for LBD/toileting. Min A for shower & toilet transfer.   PT: CG/CS for all. 1 6 inch step with mod A x2. Car transfers with CGA.   RT: --  DME: WC     Barriers: --   TDD: 12/20 to home.  ----------------------------------------  OUTPATIENT/FOLLOW UP:    Trae Whitney  Pulmonary Disease  [address], [address] [place] [address]  Phone: [phone]  Fax: [phone]  Follow Up Time: 2 weeks    Ryanne Allen  Rheumatology  [address] [address]  Phone: [phone]  Fax: [phone]  Follow Up Time: 1 week    Kyle Pierce  Internal Medicine  [address] [place] [address], [address]  [place] [address]  Phone: [phone]  Fax: [phone]  Follow Up Time: 2 weeks    Addy Powers  Pulmonary Disease  [address] [facility], [address]  [place], [place] [id number]  Phone: [phone]  Fax: [phone]  Follow Up Time:     Kwame Hawley  Critical Care Medicine  [address] [facility], [address]  [place] [address]  Phone: [phone]  Fax: [phone]  Follow Up Time:     Neena Borrego  Rheumatology  [address] [facility], [address]  [place] [address]  Phone: [phone]  Fax: [phone]  Follow Up Time:    [facility]  [address]  Scheduled Appointment: 09/13/2023  2:40 PM       Non critical care  Time spent 32 mins, discussed patient's progress, disharge plan and care co ordnation

## 2023-12-20 VITALS
RESPIRATION RATE: 16 BRPM | TEMPERATURE: 98 F | OXYGEN SATURATION: 94 % | SYSTOLIC BLOOD PRESSURE: 113 MMHG | DIASTOLIC BLOOD PRESSURE: 71 MMHG | HEART RATE: 78 BPM

## 2023-12-20 LAB
ALDOLASE SERPL-CCNC: 5.3 U/L — SIGNIFICANT CHANGE UP (ref 3.3–10.3)
ALDOLASE SERPL-CCNC: 5.3 U/L — SIGNIFICANT CHANGE UP (ref 3.3–10.3)
DSDNA AB SER-ACNC: <12 IU/ML — SIGNIFICANT CHANGE UP
DSDNA AB SER-ACNC: <12 IU/ML — SIGNIFICANT CHANGE UP

## 2023-12-20 PROCEDURE — 97167 OT EVAL HIGH COMPLEX 60 MIN: CPT

## 2023-12-20 PROCEDURE — 97535 SELF CARE MNGMENT TRAINING: CPT

## 2023-12-20 PROCEDURE — 86140 C-REACTIVE PROTEIN: CPT

## 2023-12-20 PROCEDURE — 92610 EVALUATE SWALLOWING FUNCTION: CPT

## 2023-12-20 PROCEDURE — 71045 X-RAY EXAM CHEST 1 VIEW: CPT

## 2023-12-20 PROCEDURE — 86769 SARS-COV-2 COVID-19 ANTIBODY: CPT

## 2023-12-20 PROCEDURE — 83605 ASSAY OF LACTIC ACID: CPT

## 2023-12-20 PROCEDURE — 71250 CT THORAX DX C-: CPT

## 2023-12-20 PROCEDURE — 82962 GLUCOSE BLOOD TEST: CPT

## 2023-12-20 PROCEDURE — 81001 URINALYSIS AUTO W/SCOPE: CPT

## 2023-12-20 PROCEDURE — 86850 RBC ANTIBODY SCREEN: CPT

## 2023-12-20 PROCEDURE — 92507 TX SP LANG VOICE COMM INDIV: CPT

## 2023-12-20 PROCEDURE — 36415 COLL VENOUS BLD VENIPUNCTURE: CPT

## 2023-12-20 PROCEDURE — 97112 NEUROMUSCULAR REEDUCATION: CPT

## 2023-12-20 PROCEDURE — 92526 ORAL FUNCTION THERAPY: CPT

## 2023-12-20 PROCEDURE — 92523 SPEECH SOUND LANG COMPREHEN: CPT

## 2023-12-20 PROCEDURE — 82550 ASSAY OF CK (CPK): CPT

## 2023-12-20 PROCEDURE — 97140 MANUAL THERAPY 1/> REGIONS: CPT

## 2023-12-20 PROCEDURE — 97116 GAIT TRAINING THERAPY: CPT

## 2023-12-20 PROCEDURE — 93005 ELECTROCARDIOGRAM TRACING: CPT

## 2023-12-20 PROCEDURE — 87086 URINE CULTURE/COLONY COUNT: CPT

## 2023-12-20 PROCEDURE — 97530 THERAPEUTIC ACTIVITIES: CPT

## 2023-12-20 PROCEDURE — 84145 PROCALCITONIN (PCT): CPT

## 2023-12-20 PROCEDURE — 85652 RBC SED RATE AUTOMATED: CPT

## 2023-12-20 PROCEDURE — 97110 THERAPEUTIC EXERCISES: CPT

## 2023-12-20 PROCEDURE — 97542 WHEELCHAIR MNGMENT TRAINING: CPT

## 2023-12-20 PROCEDURE — 87040 BLOOD CULTURE FOR BACTERIA: CPT

## 2023-12-20 PROCEDURE — 0225U NFCT DS DNA&RNA 21 SARSCOV2: CPT

## 2023-12-20 PROCEDURE — 86160 COMPLEMENT ANTIGEN: CPT

## 2023-12-20 PROCEDURE — 87077 CULTURE AEROBIC IDENTIFY: CPT

## 2023-12-20 PROCEDURE — 93926 LOWER EXTREMITY STUDY: CPT

## 2023-12-20 PROCEDURE — 87635 SARS-COV-2 COVID-19 AMP PRB: CPT

## 2023-12-20 PROCEDURE — 85027 COMPLETE CBC AUTOMATED: CPT

## 2023-12-20 PROCEDURE — 82085 ASSAY OF ALDOLASE: CPT

## 2023-12-20 PROCEDURE — 85610 PROTHROMBIN TIME: CPT

## 2023-12-20 PROCEDURE — 86900 BLOOD TYPING SEROLOGIC ABO: CPT

## 2023-12-20 PROCEDURE — 87186 SC STD MICRODIL/AGAR DIL: CPT

## 2023-12-20 PROCEDURE — 74018 RADEX ABDOMEN 1 VIEW: CPT

## 2023-12-20 PROCEDURE — 86225 DNA ANTIBODY NATIVE: CPT

## 2023-12-20 PROCEDURE — 82652 VIT D 1 25-DIHYDROXY: CPT

## 2023-12-20 PROCEDURE — 93970 EXTREMITY STUDY: CPT

## 2023-12-20 PROCEDURE — 97163 PT EVAL HIGH COMPLEX 45 MIN: CPT

## 2023-12-20 PROCEDURE — 93971 EXTREMITY STUDY: CPT

## 2023-12-20 PROCEDURE — 85730 THROMBOPLASTIN TIME PARTIAL: CPT

## 2023-12-20 PROCEDURE — 80053 COMPREHEN METABOLIC PANEL: CPT

## 2023-12-20 PROCEDURE — 86901 BLOOD TYPING SEROLOGIC RH(D): CPT

## 2023-12-20 PROCEDURE — 83615 LACTATE (LD) (LDH) ENZYME: CPT

## 2023-12-20 PROCEDURE — 85025 COMPLETE CBC W/AUTO DIFF WBC: CPT

## 2023-12-20 PROCEDURE — 99239 HOSP IP/OBS DSCHRG MGMT >30: CPT

## 2023-12-20 RX ORDER — BACLOFEN 100 %
1 POWDER (GRAM) MISCELLANEOUS
Qty: 90 | Refills: 0
Start: 2023-12-20 | End: 2024-01-18

## 2023-12-20 RX ADMIN — Medication 1 TABLET(S): at 08:10

## 2023-12-20 RX ADMIN — ZINC OXIDE 1 APPLICATION(S): 200 OINTMENT TOPICAL at 15:07

## 2023-12-20 RX ADMIN — Medication 5 MILLIGRAM(S): at 15:44

## 2023-12-20 RX ADMIN — GABAPENTIN 300 MILLIGRAM(S): 400 CAPSULE ORAL at 15:06

## 2023-12-20 RX ADMIN — Medication 5 MILLIGRAM(S): at 08:10

## 2023-12-20 RX ADMIN — PANTOPRAZOLE SODIUM 40 MILLIGRAM(S): 20 TABLET, DELAYED RELEASE ORAL at 08:11

## 2023-12-20 RX ADMIN — LIDOCAINE 1 PATCH: 4 CREAM TOPICAL at 08:16

## 2023-12-20 RX ADMIN — ZINC OXIDE 1 APPLICATION(S): 200 OINTMENT TOPICAL at 08:11

## 2023-12-20 RX ADMIN — GABAPENTIN 300 MILLIGRAM(S): 400 CAPSULE ORAL at 08:10

## 2023-12-20 RX ADMIN — Medication 12.5 MILLIGRAM(S): at 08:10

## 2023-12-20 RX ADMIN — ATOVAQUONE 1500 MILLIGRAM(S): 750 SUSPENSION ORAL at 15:45

## 2023-12-20 RX ADMIN — LIDOCAINE 2 PATCH: 4 CREAM TOPICAL at 08:16

## 2023-12-20 RX ADMIN — APIXABAN 5 MILLIGRAM(S): 2.5 TABLET, FILM COATED ORAL at 08:10

## 2023-12-20 RX ADMIN — Medication 28 MILLIGRAM(S): at 08:11

## 2023-12-20 NOTE — PROGRESS NOTE ADULT - REASON FOR ADMISSION
Interstitial Lung Disease

## 2023-12-20 NOTE — PROGRESS NOTE ADULT - SUBJECTIVE AND OBJECTIVE BOX
SUBJECTIVE/ROS:  Patient seen and examined at bedside.  Partner present.   Admits to sleeping well.  No acute urinary or GI symptoms.   Plan for DC home 12/20  Plan for repeat CT chest outpatient, per pulmonary.     ROS--No chest or abd pain, no palpitations nausea or vomiting, LBM 12/19    Therapy  Sustained improvement noted  Ambulating >100FT  with walker, cga but still requiring assistance sit to stand due to hip extensor weakness  Selft ambulatory with manual WC      Vital Signs Last 24 Hrs  T(C): 36.4 (20 Dec 2023 08:13), Max: 36.8 (19 Dec 2023 21:35)  T(F): 97.5 (20 Dec 2023 08:13), Max: 98.3 (19 Dec 2023 21:35)  HR: 78 (20 Dec 2023 08:13) (78 - 88)  BP: 113/71 (20 Dec 2023 08:13) (113/71 - 124/84)  BP(mean): --  RR: 16 (20 Dec 2023 08:13) (16 - 16)  SpO2: 94% (20 Dec 2023 08:13) (94% - 95%)      PHYSICAL EXAM:  Gen -  Comfortable,  at bedside -normal oxy sat and subsequently self ambulating WC functional distance and with walker for increasing distance, now > 100FT  Pulm - clear  Cardiovascular - HS i and II intermittently irregular  Abdomen - Soft, non tender,   Extremities - edema to b/l LE, no calf tenderness, LUE Med line    Neuro-  Cognitive - awake, alert, fully oriented,  Light tough sensation diffusely reduced on fingers and dorsal foot.   Motor -                    LEFT    UE - 4/5                    RIGHT UE - 4/5                     LEFT    LE - 3+/5, distally and 3/5 proximal                    RIGHT LE - Ankles PF 3/5 ,DF 2/5, Knee ext 3/5 Hips limited by pain Rt hip due to ulcer at ECHO access site   Sensory - decreased light tough sensation fingers and sole of foot, difusely  MSK: improvement with motor strength  Psychiatric - Mood stable, Affect WNL  Skin -- , Pressure injury on b/l buttock is healing, no discharge  Wound on right pito, continues to improve, very limited surface area and shallow  MMT--Able to contract B/L upper back muscles, EF/EE 4/5 distally, knee Est 3+/5      RECENT LABS/IMAGING                        10.3   7.46  )-----------( 306      ( 18 Dec 2023 08:40 )             34.2     12-18    141  |  105  |  17  ----------------------------<  121<H>  4.2   |  28  |  0.56    Ca    9.3      18 Dec 2023 08:40    TPro  5.5<L>  /  Alb  2.8<L>  /  TBili  0.6  /  DBili  x   /  AST  21  /  ALT  48<H>  /  AlkPhos  103  12-18    Lactate down trending    Urinalysis Basic - ( 18 Dec 2023 08:40 )    Color: x / Appearance: x / SG: x / pH: x  Gluc: 121 mg/dL / Ketone: x  / Bili: x / Urobili: x   Blood: x / Protein: x / Nitrite: x   Leuk Esterase: x / RBC: x / WBC x   Sq Epi: x / Non Sq Epi: x / Bacteria: x      MEDICATIONS  (STANDING):  apixaban 5 milliGRAM(s) Oral every 12 hours  artificial  tears Solution 1 Drop(s) Both EYES every 12 hours  ascorbic acid 500 milliGRAM(s) Oral daily  atovaquone  Suspension 1500 milliGRAM(s) Oral daily  dextrose 5%. 1000 milliLiter(s) (100 mL/Hr) IV Continuous <Continuous>  dextrose 5%. 1000 milliLiter(s) (50 mL/Hr) IV Continuous <Continuous>  dextrose 50% Injectable 12.5 Gram(s) IV Push once  dextrose 50% Injectable 25 Gram(s) IV Push once  dextrose 50% Injectable 25 Gram(s) IV Push once  folic acid 1 milliGRAM(s) Oral daily  gabapentin 300 milliGRAM(s) Oral three times a day  glucagon  Injectable 1 milliGRAM(s) IntraMuscular once  lactobacillus acidophilus 1 Tablet(s) Oral two times a day with meals  lidocaine   4% Patch 1 Patch Transdermal <User Schedule>  lidocaine   4% Patch 2 Patch Transdermal <User Schedule>  melatonin 6 milliGRAM(s) Oral at bedtime  methylPREDNISolone 28 milliGRAM(s) Oral daily  metoprolol tartrate 12.5 milliGRAM(s) Oral two times a day  multivitamin 1 Tablet(s) Oral daily  nystatin Powder 1 Application(s) Topical two times a day  pantoprazole    Tablet 40 milliGRAM(s) Oral before breakfast  polyethylene glycol 3350 17 Gram(s) Oral <User Schedule>  sodium chloride 0.9% Bolus 1000 milliLiter(s) IV Bolus once  sodium chloride 0.9% lock flush 5 milliLiter(s) IV Push two times a day  sodium chloride 0.9%. 1000 milliLiter(s) (100 mL/Hr) IV Continuous <Continuous>  zinc oxide 40% Paste 1 Application(s) Topical three times a day    MEDICATIONS  (PRN):  acetaminophen     Tablet .. 650 milliGRAM(s) Oral every 6 hours PRN Temp greater or equal to 38C (100.4F), Mild Pain (1 - 3), Moderate Pain (4 - 6)  albuterol/ipratropium for Nebulization 3 milliLiter(s) Nebulizer every 6 hours PRN Shortness of Breath and/or Wheezing  ALPRAZolam 0.25 milliGRAM(s) Oral every 6 hours PRN Anxiety  aluminum hydroxide/magnesium hydroxide/simethicone Suspension 30 milliLiter(s) Oral every 4 hours PRN Dyspepsia  ammonium lactate 12% Lotion 1 Application(s) Topical every 12 hours PRN BL feet dryness  baclofen 5 milliGRAM(s) Oral every 8 hours PRN Musculoskeletal Pain  benzocaine/menthol Lozenge 1 Lozenge Oral two times a day PRN Sore Throat  benzonatate 100 milliGRAM(s) Oral three times a day PRN Cough  bisacodyl 5 milliGRAM(s) Oral daily PRN Constipation  dextrose Oral Gel 15 Gram(s) Oral once PRN Blood Glucose LESS THAN 70 milliGRAM(s)/deciliter  hydrocortisone hemorrhoidal Suppository 1 Suppository(s) Rectal two times a day PRN Hemorrhoids  loperamide 2 milliGRAM(s) Oral three times a day PRN Diarrhea  SIMETHICONE SOFTGELS 250 milliGRAM(s),SIMETHICONE SOFTGELS 250 milliGRAM(s) 250 milliGRAM(s) Oral three times a day PRN for gas  sodium chloride 0.65% Nasal 1 Spray(s) Both Nostrils every 8 hours PRN Nasal Congestion      SUBJECTIVE/ROS:  Patient seen and examined at bedside.  Partner present.   Admits to sleeping well.  Reports back pain post therapy yesterday, responsive to lidocaine patch  No acute urinary or GI symptoms.   Plan for DC home 12/20  Plan for repeat CT chest outpatient, per pulmonary.     ROS--No chest or abd pain, no palpitations nausea or vomiting, LBM 12/19    Therapy  Sustained improvement noted  Ambulating >100FT  with walker, cga but   Observed sit to stand yesterday without assistance   Self ambulatory with manual WC    We discussed therapy plan post discharge, areas to concentrate,  and f/u plan with multiple specialties     Vital Signs Last 24 Hrs  T(C): 36.4 (20 Dec 2023 08:13), Max: 36.8 (19 Dec 2023 21:35)  T(F): 97.5 (20 Dec 2023 08:13), Max: 98.3 (19 Dec 2023 21:35)  HR: 78 (20 Dec 2023 08:13) (78 - 88)  BP: 113/71 (20 Dec 2023 08:13) (113/71 - 124/84)  RR: 16 (20 Dec 2023 08:13) (16 - 16)  SpO2: 94% (20 Dec 2023 08:13) (94% - 95%)      PHYSICAL EXAM:  Gen -  Comfortable,  at bedside -normal oxy sat and subsequently self ambulating WC functional distance and with walker for increasing distance, now > 100FT  Pulm - clear  Cardiovascular - HS i and II intermittently irregular  Abdomen - Soft, non tender,   Extremities - edema to b/l LE, no calf tenderness, LUE Med line    Neuro-  Cognitive - awake, alert, fully oriented,  Light tough sensation diffusely reduced on fingers and dorsal foot.   Motor - Upper ext 4/5, lower 4/5 except hip ext 3/5   Sensory - decreased light tough sensation fingers and sole of foot, diffusely  MSK: improvement with motor strength  Psychiatric - Mood stable, Affect WNL  Skin -- , Pressure injury on b/l buttock is healing, no discharge  Wound on right pito, continues to improve, very limited surface area and shallow        RECENT LABS/IMAGING                10.3   7.46  )-----------( 306      ( 18 Dec 2023 08:40 )             34.2     12-18    141  |  105  |  17  ----------------------------<  121<H>  4.2   |  28  |  0.56    Ca    9.3      18 Dec 2023 08:40    TPro  5.5<L>  /  Alb  2.8<L>  /  TBili  0.6  /  DBili  x   /  AST  21  /  ALT  48<H>  /  AlkPhos  103  12-18    Lactate down trending    Urinalysis Basic - ( 18 Dec 2023 08:40 )    Color: x / Appearance: x / SG: x / pH: x  Gluc: 121 mg/dL / Ketone: x  / Bili: x / Urobili: x   Blood: x / Protein: x / Nitrite: x   Leuk Esterase: x / RBC: x / WBC x   Sq Epi: x / Non Sq Epi: x / Bacteria: x      MEDICATIONS  (STANDING):  apixaban 5 milliGRAM(s) Oral every 12 hours  artificial  tears Solution 1 Drop(s) Both EYES every 12 hours  ascorbic acid 500 milliGRAM(s) Oral daily  atovaquone  Suspension 1500 milliGRAM(s) Oral daily  dextrose 5%. 1000 milliLiter(s) (100 mL/Hr) IV Continuous <Continuous>  dextrose 5%. 1000 milliLiter(s) (50 mL/Hr) IV Continuous <Continuous>  dextrose 50% Injectable 12.5 Gram(s) IV Push once  dextrose 50% Injectable 25 Gram(s) IV Push once  dextrose 50% Injectable 25 Gram(s) IV Push once  folic acid 1 milliGRAM(s) Oral daily  gabapentin 300 milliGRAM(s) Oral three times a day  glucagon  Injectable 1 milliGRAM(s) IntraMuscular once  lactobacillus acidophilus 1 Tablet(s) Oral two times a day with meals  lidocaine   4% Patch 1 Patch Transdermal <User Schedule>  lidocaine   4% Patch 2 Patch Transdermal <User Schedule>  melatonin 6 milliGRAM(s) Oral at bedtime  methylPREDNISolone 28 milliGRAM(s) Oral daily  metoprolol tartrate 12.5 milliGRAM(s) Oral two times a day  multivitamin 1 Tablet(s) Oral daily  nystatin Powder 1 Application(s) Topical two times a day  pantoprazole    Tablet 40 milliGRAM(s) Oral before breakfast  polyethylene glycol 3350 17 Gram(s) Oral <User Schedule>  sodium chloride 0.9% Bolus 1000 milliLiter(s) IV Bolus once  sodium chloride 0.9% lock flush 5 milliLiter(s) IV Push two times a day  sodium chloride 0.9%. 1000 milliLiter(s) (100 mL/Hr) IV Continuous <Continuous>  zinc oxide 40% Paste 1 Application(s) Topical three times a day    MEDICATIONS  (PRN):  acetaminophen     Tablet .. 650 milliGRAM(s) Oral every 6 hours PRN Temp greater or equal to 38C (100.4F), Mild Pain (1 - 3), Moderate Pain (4 - 6)  albuterol/ipratropium for Nebulization 3 milliLiter(s) Nebulizer every 6 hours PRN Shortness of Breath and/or Wheezing  ALPRAZolam 0.25 milliGRAM(s) Oral every 6 hours PRN Anxiety  aluminum hydroxide/magnesium hydroxide/simethicone Suspension 30 milliLiter(s) Oral every 4 hours PRN Dyspepsia  ammonium lactate 12% Lotion 1 Application(s) Topical every 12 hours PRN BL feet dryness  baclofen 5 milliGRAM(s) Oral every 8 hours PRN Musculoskeletal Pain  benzocaine/menthol Lozenge 1 Lozenge Oral two times a day PRN Sore Throat  benzonatate 100 milliGRAM(s) Oral three times a day PRN Cough  bisacodyl 5 milliGRAM(s) Oral daily PRN Constipation  dextrose Oral Gel 15 Gram(s) Oral once PRN Blood Glucose LESS THAN 70 milliGRAM(s)/deciliter  hydrocortisone hemorrhoidal Suppository 1 Suppository(s) Rectal two times a day PRN Hemorrhoids  loperamide 2 milliGRAM(s) Oral three times a day PRN Diarrhea  SIMETHICONE SOFTGELS 250 milliGRAM(s),SIMETHICONE SOFTGELS 250 milliGRAM(s) 250 milliGRAM(s) Oral three times a day PRN for gas  sodium chloride 0.65% Nasal 1 Spray(s) Both Nostrils every 8 hours PRN Nasal Congestion

## 2023-12-20 NOTE — PROGRESS NOTE ADULT - NUTRITIONAL ASSESSMENT
This patient has been assessed with a concern for Malnutrition and has been determined to have a diagnosis/diagnoses of Severe protein-calorie malnutrition.    This patient is being managed with:   Diet Consistent Carbohydrate/No Snacks-  Darinel(7 Gm Arginine/7 Gm Glut/1.2 Gm HMB     Qty per Day:  BID  Entered: Oct 13 2023  1:30PM  
This patient has been assessed with a concern for Malnutrition and has been determined to have a diagnosis/diagnoses of Severe protein-calorie malnutrition.    This patient is being managed with:   Diet Consistent Carbohydrate/No Snacks-  Supplement Feeding Modality:  Oral  Glucerna Shake Cans or Servings Per Day:  1       Frequency:  Daily  Entered: Oct 30 2023  4:05PM  
This patient has been assessed with a concern for Malnutrition and has been determined to have a diagnosis/diagnoses of Severe protein-calorie malnutrition.    This patient is being managed with:   Diet Consistent Carbohydrate/No Snacks-  Supplement Feeding Modality:  Oral  Glucerna Shake Cans or Servings Per Day:  1       Frequency:  Daily  Entered: Oct 30 2023  4:05PM    Diet Consistent Carbohydrate/No Snacks-  Entered: Oct 27 2023  2:35PM    The following pending diet order is being considered for treatment of Severe protein-calorie malnutrition:null
This patient has been assessed with a concern for Malnutrition and has been determined to have a diagnosis/diagnoses of Severe protein-calorie malnutrition.    This patient is being managed with:   Diet Regular-  Entered: Dec  4 2023  8:38AM  
This patient has been assessed with a concern for Malnutrition and has been determined to have a diagnosis/diagnoses of Severe protein-calorie malnutrition.    This patient is being managed with:   Diet Regular-  Supplement Feeding Modality:  Oral  Glucerna Shake Cans or Servings Per Day:  1       Frequency:  Two Times a day  Entered: Nov 23 2023 10:51AM  
This patient has been assessed with a concern for Malnutrition and has been determined to have a diagnosis/diagnoses of Severe protein-calorie malnutrition.    This patient is being managed with:   Diet Regular-  Supplement Feeding Modality:  Oral  Glucerna Shake Cans or Servings Per Day:  1       Frequency:  Two Times a day  Entered: Nov 23 2023 10:51AM  
This patient has been assessed with a concern for Malnutrition and has been determined to have a diagnosis/diagnoses of Severe protein-calorie malnutrition.    This patient is being managed with:   Diet Consistent Carbohydrate/No Snacks-  Darinel(7 Gm Arginine/7 Gm Glut/1.2 Gm HMB     Qty per Day:  BID  Entered: Oct 13 2023  1:30PM  
This patient has been assessed with a concern for Malnutrition and has been determined to have a diagnosis/diagnoses of Severe protein-calorie malnutrition.    This patient is being managed with:   Diet Consistent Carbohydrate/No Snacks-  Entered: Oct 27 2023  2:35PM  
This patient has been assessed with a concern for Malnutrition and has been determined to have a diagnosis/diagnoses of Severe protein-calorie malnutrition.    This patient is being managed with:   Diet Consistent Carbohydrate/No Snacks-  Supplement Feeding Modality:  Oral  Glucerna Shake Cans or Servings Per Day:  1       Frequency:  Daily  Entered: Oct 30 2023  4:05PM  
This patient has been assessed with a concern for Malnutrition and has been determined to have a diagnosis/diagnoses of Severe protein-calorie malnutrition.    This patient is being managed with:   Diet Consistent Carbohydrate/No Snacks-  Supplement Feeding Modality:  Oral  Glucerna Shake Cans or Servings Per Day:  1       Frequency:  Daily  Entered: Oct 30 2023  4:05PM    Diet Consistent Carbohydrate/No Snacks-  Entered: Oct 27 2023  2:35PM    The following pending diet order is being considered for treatment of Severe protein-calorie malnutrition:null
This patient has been assessed with a concern for Malnutrition and has been determined to have a diagnosis/diagnoses of Severe protein-calorie malnutrition.    This patient is being managed with:   Diet Regular-  Entered: Dec  4 2023  8:38AM  
This patient has been assessed with a concern for Malnutrition and has been determined to have a diagnosis/diagnoses of Severe protein-calorie malnutrition.    This patient is being managed with:   Diet Regular-  Supplement Feeding Modality:  Oral  Glucerna Shake Cans or Servings Per Day:  1       Frequency:  Two Times a day  Entered: Nov 23 2023 10:51AM  
This patient has been assessed with a concern for Malnutrition and has been determined to have a diagnosis/diagnoses of Severe protein-calorie malnutrition.    This patient is being managed with:   Diet Regular-  Supplement Feeding Modality:  Oral  Glucerna Shake Cans or Servings Per Day:  1       Frequency:  Two Times a day  Entered: Nov 23 2023 10:51AM  
This patient has been assessed with a concern for Malnutrition and has been determined to have a diagnosis/diagnoses of Severe protein-calorie malnutrition.    This patient is being managed with:   Diet Soft and Bite Sized-  Consistent Carbohydrate {No Snacks}  Darinel(7 Gm Arginine/7 Gm Glut/1.2 Gm HMB     Qty per Day:  2  Supplement Feeding Modality:  Oral  Glucerna Shake Cans or Servings Per Day:  1       Frequency:  Daily  Entered: Oct  9 2023  3:30PM  
This patient has been assessed with a concern for Malnutrition and has been determined to have a diagnosis/diagnoses of Severe protein-calorie malnutrition.    This patient is being managed with:   Diet Soft and Bite Sized-  Consistent Carbohydrate {No Snacks}  Darinel(7 Gm Arginine/7 Gm Glut/1.2 Gm HMB     Qty per Day:  2  Supplement Feeding Modality:  Oral  Glucerna Shake Cans or Servings Per Day:  1       Frequency:  Daily  Entered: Oct  9 2023  3:30PM  
This patient has been assessed with a concern for Malnutrition and has been determined to have a diagnosis/diagnoses of Severe protein-calorie malnutrition.    This patient is being managed with:   Diet Consistent Carbohydrate/No Snacks-  Darinel(7 Gm Arginine/7 Gm Glut/1.2 Gm HMB     Qty per Day:  BID  Entered: Oct 13 2023  1:30PM  
This patient has been assessed with a concern for Malnutrition and has been determined to have a diagnosis/diagnoses of Severe protein-calorie malnutrition.    This patient is being managed with:   Diet Consistent Carbohydrate/No Snacks-  Darinel(7 Gm Arginine/7 Gm Glut/1.2 Gm HMB     Qty per Day:  BID  Entered: Oct 13 2023  1:30PM  
This patient has been assessed with a concern for Malnutrition and has been determined to have a diagnosis/diagnoses of Severe protein-calorie malnutrition.    This patient is being managed with:   Diet Consistent Carbohydrate/No Snacks-  Darinel(7 Gm Arginine/7 Gm Glut/1.2 Gm HMB     Qty per Day:  BID  Supplement Feeding Modality:  Oral  Glucerna Shake Cans or Servings Per Day:  1       Frequency:  Daily  Entered: Oct 12 2023  3:33PM  
This patient has been assessed with a concern for Malnutrition and has been determined to have a diagnosis/diagnoses of Severe protein-calorie malnutrition.    This patient is being managed with:   Diet Consistent Carbohydrate/No Snacks-  Supplement Feeding Modality:  Oral  Glucerna Shake Cans or Servings Per Day:  1       Frequency:  Daily  Entered: Oct 30 2023  4:05PM  
This patient has been assessed with a concern for Malnutrition and has been determined to have a diagnosis/diagnoses of Severe protein-calorie malnutrition.    This patient is being managed with:   Diet Regular-  Supplement Feeding Modality:  Oral  Glucerna Shake Cans or Servings Per Day:  1       Frequency:  Two Times a day  Entered: Nov 23 2023 10:51AM  
This patient has been assessed with a concern for Malnutrition and has been determined to have a diagnosis/diagnoses of Severe protein-calorie malnutrition.    This patient is being managed with:   Diet Consistent Carbohydrate/No Snacks-  Darinel(7 Gm Arginine/7 Gm Glut/1.2 Gm HMB     Qty per Day:  BID  Entered: Oct 13 2023  1:30PM  
This patient has been assessed with a concern for Malnutrition and has been determined to have a diagnosis/diagnoses of Severe protein-calorie malnutrition.    This patient is being managed with:   Diet Consistent Carbohydrate/No Snacks-  Entered: Oct 27 2023  2:35PM  
This patient has been assessed with a concern for Malnutrition and has been determined to have a diagnosis/diagnoses of Severe protein-calorie malnutrition.    This patient is being managed with:   Diet Consistent Carbohydrate/No Snacks-  Supplement Feeding Modality:  Oral  Glucerna Shake Cans or Servings Per Day:  1       Frequency:  Daily  Entered: Oct 30 2023  4:05PM  
This patient has been assessed with a concern for Malnutrition and has been determined to have a diagnosis/diagnoses of Severe protein-calorie malnutrition.    This patient is being managed with:   Diet Consistent Carbohydrate/No Snacks-  Supplement Feeding Modality:  Oral  Glucerna Shake Cans or Servings Per Day:  1       Frequency:  Daily  Entered: Oct 30 2023  4:05PM    This patient has been assessed with a concern for Malnutrition and has been determined to have a diagnosis/diagnoses of Severe protein-calorie malnutrition.    This patient is being managed with:   Diet Consistent Carbohydrate/No Snacks-  Supplement Feeding Modality:  Oral  Glucerna Shake Cans or Servings Per Day:  1       Frequency:  Daily  Entered: Oct 30 2023  4:05PM  
This patient has been assessed with a concern for Malnutrition and has been determined to have a diagnosis/diagnoses of Severe protein-calorie malnutrition.    This patient is being managed with:   Diet Regular-  Entered: Dec  4 2023  8:38AM  
This patient has been assessed with a concern for Malnutrition and has been determined to have a diagnosis/diagnoses of Severe protein-calorie malnutrition.    This patient is being managed with:   Diet Regular-  Supplement Feeding Modality:  Oral  Glucerna Shake Cans or Servings Per Day:  1       Frequency:  Two Times a day  Entered: Nov 23 2023 10:51AM  
This patient has been assessed with a concern for Malnutrition and has been determined to have a diagnosis/diagnoses of Severe protein-calorie malnutrition.    This patient is being managed with:   Diet Soft and Bite Sized-  Consistent Carbohydrate {No Snacks}  Darinel(7 Gm Arginine/7 Gm Glut/1.2 Gm HMB     Qty per Day:  2  Supplement Feeding Modality:  Oral  Glucerna Shake Cans or Servings Per Day:  1       Frequency:  Daily  Entered: Oct  9 2023  3:30PM  
This patient has been assessed with a concern for Malnutrition and has been determined to have a diagnosis/diagnoses of Severe protein-calorie malnutrition.    This patient is being managed with:   Diet Consistent Carbohydrate/No Snacks-  Darinel(7 Gm Arginine/7 Gm Glut/1.2 Gm HMB     Qty per Day:  BID  Entered: Oct 13 2023  1:30PM  
This patient has been assessed with a concern for Malnutrition and has been determined to have a diagnosis/diagnoses of Severe protein-calorie malnutrition.    This patient is being managed with:   Diet Consistent Carbohydrate/No Snacks-  Darinel(7 Gm Arginine/7 Gm Glut/1.2 Gm HMB     Qty per Day:  BID  Entered: Oct 13 2023  1:30PM  
This patient has been assessed with a concern for Malnutrition and has been determined to have a diagnosis/diagnoses of Severe protein-calorie malnutrition.    This patient is being managed with:   Diet Consistent Carbohydrate/No Snacks-  Darniel(7 Gm Arginine/7 Gm Glut/1.2 Gm HMB     Qty per Day:  BID  Entered: Oct 13 2023  1:30PM  
This patient has been assessed with a concern for Malnutrition and has been determined to have a diagnosis/diagnoses of Severe protein-calorie malnutrition.    This patient is being managed with:   Diet Consistent Carbohydrate/No Snacks-  Entered: Oct 27 2023  2:35PM  
This patient has been assessed with a concern for Malnutrition and has been determined to have a diagnosis/diagnoses of Severe protein-calorie malnutrition.    This patient is being managed with:   Diet Consistent Carbohydrate/No Snacks-  Supplement Feeding Modality:  Oral  Glucerna Shake Cans or Servings Per Day:  1       Frequency:  Daily  Entered: Oct 30 2023  4:05PM  
This patient has been assessed with a concern for Malnutrition and has been determined to have a diagnosis/diagnoses of Severe protein-calorie malnutrition.    This patient is being managed with:   Diet Minced and Moist-  Consistent Carbohydrate {No Snacks} (CSTCHO)  Darinel(7 Gm Arginine/7 Gm Glut/1.2 Gm HMB     Qty per Day:  BID  Supplement Feeding Modality:  Oral  Glucerna Shake Cans or Servings Per Day:  1       Frequency:  Daily  Entered: Oct  6 2023  9:18AM  
This patient has been assessed with a concern for Malnutrition and has been determined to have a diagnosis/diagnoses of Severe protein-calorie malnutrition.    This patient is being managed with:   Diet Regular-  Entered: Dec  4 2023  8:38AM  
This patient has been assessed with a concern for Malnutrition and has been determined to have a diagnosis/diagnoses of Severe protein-calorie malnutrition.    This patient is being managed with:   Diet Regular-  Entered: Dec  4 2023  8:38AM  
This patient has been assessed with a concern for Malnutrition and has been determined to have a diagnosis/diagnoses of Severe protein-calorie malnutrition.    This patient is being managed with:   Diet Regular-  Supplement Feeding Modality:  Oral  Glucerna Shake Cans or Servings Per Day:  1       Frequency:  Two Times a day  Entered: Nov 23 2023 10:51AM  
This patient has been assessed with a concern for Malnutrition and has been determined to have a diagnosis/diagnoses of Severe protein-calorie malnutrition.    This patient is being managed with:   Diet Regular-  Entered: Dec  4 2023  8:38AM  
This patient has been assessed with a concern for Malnutrition and has been determined to have a diagnosis/diagnoses of Severe protein-calorie malnutrition.    This patient is being managed with:   Diet Regular-  Supplement Feeding Modality:  Oral  Glucerna Shake Cans or Servings Per Day:  1       Frequency:  Two Times a day  Entered: Nov 23 2023 10:51AM  
This patient has been assessed with a concern for Malnutrition and has been determined to have a diagnosis/diagnoses of Severe protein-calorie malnutrition.    This patient is being managed with:   Diet Regular-  Entered: Dec  4 2023  8:38AM  

## 2023-12-20 NOTE — PROGRESS NOTE ADULT - NS ATTEND OPT1 [email or web address]_CORE

## 2023-12-20 NOTE — PROGRESS NOTE ADULT - ASSESSMENT
Assessment/Plan:  ALIZA FOLEY is a 62 year old female with PMH of pAFIB and sciatica; who presented to [facility] ED with SOB, and was found to have groundglass opacities and interstitial lung disease. She was treated with empiric Vancomycin and Zosyn. She underwent a bronchoscopy with bronchoalveolar lavage and pulse steroids. She was given IV diuretics for increased pulmonary congestion, but her respiratory status continued to worsen.  On 9/13, she was transferred to [facility] for ECMO requirements. She was further treated with Plasmapheresis, Rituximab and high-dose steroids, with significant improvement. Her overall hospital course was complicated by thrombocytopenia (s/p several platelet transfusions), leukocytosis (infectious workup entirely negative- s/p Vanco and Ceftriaxone), AFIB with RVR (resolved with Metoprolol), dysphagia (requiring NGT), transaminitis (secondary to acute illness and hypotension),  non-occlusive RIJ DVT (started on Lovenox). Patient now admitted for a multidisciplinary rehab program. 10-05-23 @ 13:18    * DC home today 12/20   * Sustained functional improvement--increasing ambulatory distance to 160 ft with RW, and functional distance with manual WC   * Rheumatology labs ordered today and will f/u with Rheum for results post dc    #Interstitial Lung Disease and Mixed connective tissue disease  - Gait Instability, ADL impairments and Functional impairments: start Comprehensive Rehab Program of PT/OT/SLP - 3 hours a day, 5 days a week  - P&O as needed   - Non-specific Interstitial Lung Disease  - Requiring ECMO   - Improvement s/p Plasmapheresis, Rituximab and high- dose steroids   - Albuterol/Ipratropium Nebulizer every 6 hours  - Mepron 1500mg daily  - PO Medrol ( due to liver dysfunction): Plan will be to taper by ~20% every 2 weeks    Medrol   64mg x 2 weeks   56mg x 2 weeks  44mg x 2 weeks   36mg x 2 weeks - Follow up Outpatient   - Plan to wean 02 as tolerated, Continue tapering oxygen,  -  CT Chest noted 11/10---Resp f/u review  11/14, appreciated  They reports sustained improvement, clinical (normal oxy sats on R/A, sustained progress with oxy tapering), and radiological (no acute findings on recent CT Chest, rather residual chronic infiltrative diesease noted, with recommendation to repeat CT after Acute rehab treatment   Rheumat rec Dr Vasquez 12/14  - Taper to Medrol 28mg x 2 weeks then 20mg x 2 weeks, then can taper by 4mg qweekly - 16mg, 12mg, 8mg. Remain on 4mg/day until next clinic visit   - continue with PCP ppx with Mepron and GI ppx with PPI while on steroids   - no further Rituxan needed at present time  - pt will [place] going to [place] shortly following hospital discharge - will have labs done prior to leaving, paper lab orders provided to her today, will arrange rheum f/u with Dr Escobar once back from [place] in late March so can facilitate next Rituxan dosing.   - Provide 1 month of medrol on discharge, my office will continue rx until she is seen by Dr Escobar. Pt has also been advised to find a rheumatologist where she will be in case of intermediate emergencies   --12/18-- Rheumatology labs will be ordered today will f/u with Rheumat for results post dc    #Fever, resolved   - Recent UTI on Bactrim (since DCd)  - UCX with enterococcus faecalis  - 101.4 fever overnight (12/4-12/5)  - Lactate of 2.5- patient refused IV fluids   - Start Zosyn IVPB 12/6 (pt agreeable)  - RVP negative  - F/U blood cultures, CXR  - ID consult and Pulm re-eval (no respiratory symptoms)  - Pulm recs: continue to monitor   - ID recs: CT chest- stable (12/5)   - Repeat Lactate of 3.1 - agreeable to IV fluids- s/p 1000mL bolus   - BL LE doppler- negative   --UTI--treated   Lactate downtrending, no active infection, no need for continued monitoring     #Cankre sore--resolved    #Knee buckling--knee strength improving  - Back muscle strain with catching self on RW   - Lidocaine patch to back and BL deltoids    #Experiencing R mandible and occipital numbness, with associated hair loss- outpatient follow up with Rheumatology    #Posttnasal drip--ENT review 11/7, declined scope, continue flonase     #pAFIB/Rt Ij thrombus  - Metoprolol 25mg BID and lovenox  ---Eliquis 5mg BID - tolerating well   -- RUE venous duplex check for resolution of know DVT 12/11    #Chronic Tinnitus   - ENT review and recs appreciate, Patient already made ENT appointment for f/u    #Lymphedema both legs -chronic and Rt IJ thrombus  - ACE Wrap BL LEs  - BL LE doppler negative for DVT  - BL UE doppler with chronic thrombotic changes in RIJ     #Transaminitis --LFT Down trending   - Secondary to acute illness and hypotension at [facility]  - Outpatient follow up     #Leucocytosis--steroid related, continue monitoring   # Elevated Lactate--downtrending 3.6 on 12/14 will monitor, no signs of acute infection   Down trending, no need for further monitoring    #Neuropathy  - Gabapentin 300 mg BID, will consider increasing dose, increase to TID 11/10  --Work on motor strengthening, priority for UE as preferred by patient   - Vit b12 >2,000 in 9/2023  -- Rhuematology review 12/14 labs recommended, will order prior to dc and f/u with Rheumat    #Sleep/Mood  - Melatonin 6mg at HS  - Psych rec Xanax 0.25mg PRN    #Skin  - Wound care recs--dry dressing over tiny surface area wound Rt groin, no need for skilled nursing care post dc   --Bruise left lower shin, resolving     #Pain Mgmt   - Tylenol PRN   - Gabapentin 300mg at HS     #GI/Bowel Mgmt/Hx of alternating bowel movements  - Pantoprazole  - PRN: Maalox   --Lactobacillus BID     #/Bladder Mgmt   -Spontaneously voiding   - UA (11/3) negative  - +UTI- Amoxicillin  completed  - 12/14---Elevated lactate--f/u repeat level and other labs     #FEN   #Dysphagia  - Diet - Minced & Moist  [CC]    - Dysphagia- SLP evaluation appreciated     #Health maintenance  - Folic Acid   - MVI  - Ocean nasal spray    # Difficulty with access to peripheral veins-- Blood draws from Left arm Medline --RUE F/U venous duplex 12/11    #Precautions / PROPHYLAXIS:   - Falls  - ortho: Weight bearing status: WBAT   - Lungs: Aspiration, Incentive Spirometer   - DVT PPX: Eliquis 5 mg BID   ----------------------------------------  12/11- --Labs CBC mild anemia, normal renal function, LFT elevated, downtrending overall unremarkable   12/14--* elevated lactate downtrending     Health maintenance--await response from insurance company for the expedited appeal regarding acute rehab treatment   ----------------------------------------  Liaison with other providers/agencies  12/8--Discussed with Respiratory, no need for repeat CT chest this time, last imaging wead 12/5, Resp will f/u with patient post d/c  ----------------------------------------  IDT conference on 12/19  Social Work: --   SLP: --  OT: Setup for eating/grooming. Sup for UBD. CG for LBD/toileting. Min A for shower & toilet transfer.   PT: CG/CS for all. 1 6 inch step with mod A x2. Car transfers with CGA.   RT: --  DME: WC     Barriers: --   TDD: 12/20 to home.  ----------------------------------------  OUTPATIENT/FOLLOW UP:    Trae Whitney  Pulmonary Disease  [address], [address] [place] [address]  Phone: [phone]  Fax: [phone]  Follow Up Time: 2 weeks    Ryanne Allen  Rheumatology  [address] [address]  Phone: [phone]  Fax: [phone]  Follow Up Time: 1 week    Kyle Pierce  Internal Medicine  [address] [place] [address], [address]  [place] [address]  Phone: [phone]  Fax: [phone]  Follow Up Time: 2 weeks    Addy Powers  Pulmonary Disease  [address] [facility], [address]  [place], [place] [address]  Phone: [phone]  Fax: [phone]  Follow Up Time:     Kwame Hawley  Critical Care Medicine  [address], [address]  [place] [address]  Phone: [phone]  Fax: [phone]  Follow Up Time:     Neena Borrego  Rheumatology  [address] [facility], [address]  [place] [address]  Phone: [phone]  Fax: [phone]  Follow Up Time:    [facility]  [facility] [address] [place] [address]  Scheduled Appointment: 09/13/2023  2:40 PM       Non critical care  Time spent 32 mins, discussed patient's progress, disharge plan and care co ordnation Assessment/Plan:  ALIZA FOLEY is a 62 year old female with PMH of pAFIB and sciatica; who presented to [facility] ED with SOB, and was found to have groundglass opacities and interstitial lung disease. She was treated with empiric Vancomycin and Zosyn. She underwent a bronchoscopy with bronchoalveolar lavage and pulse steroids. She was given IV diuretics for increased pulmonary congestion, but her respiratory status continued to worsen.  On 9/13, she was transferred to [facility] for ECMO requirements. She was further treated with Plasmapheresis, Rituximab and high-dose steroids, with significant improvement. Her overall hospital course was complicated by thrombocytopenia (s/p several platelet transfusions), leukocytosis (infectious workup entirely negative- s/p Vanco and Ceftriaxone), AFIB with RVR (resolved with Metoprolol), dysphagia (requiring NGT), transaminitis (secondary to acute illness and hypotension),  non-occlusive RIJ DVT (started on Lovenox). Patient now admitted for a multidisciplinary rehab program. 10-05-23 @ 13:18    * DC home today 12/20   * Sustained functional improvement--increasing ambulatory distance to 160 ft with RW, and functional distance with manual WC   * Rheumatology labs ordered today and will f/u with Rheum for results post dc    #Interstitial Lung Disease and Mixed connective tissue disease  - Gait Instability, ADL impairments and Functional impairments: start Comprehensive Rehab Program of PT/OT/SLP - 3 hours a day, 5 days a week  - P&O as needed   - Non-specific Interstitial Lung Disease  - Requiring ECMO   - Improvement s/p Plasmapheresis, Rituximab and high- dose steroids   - Albuterol/Ipratropium Nebulizer every 6 hours  - Mepron 1500mg daily  - PO Medrol ( due to liver dysfunction): Plan will be to taper by ~20% every 2 weeks    Medrol   64mg x 2 weeks   56mg x 2 weeks  44mg x 2 weeks   36mg x 2 weeks - Follow up Outpatient   - Plan to wean 02 as tolerated, Continue tapering oxygen,  -  CT Chest noted 11/10---Resp f/u review  11/14, appreciated  They reports sustained improvement, clinical (normal oxy sats on R/A, sustained progress with oxy tapering), and radiological (no acute findings on recent CT Chest, rather residual chronic infiltrative diesease noted, with recommendation to repeat CT after Acute rehab treatment   Rheumat rec Dr Vasquez 12/14  - Taper to Medrol 28mg x 2 weeks then 20mg x 2 weeks, then can taper by 4mg qweekly - 16mg, 12mg, 8mg. Remain on 4mg/day until next clinic visit   - continue with PCP ppx with Mepron and GI ppx with PPI while on steroids   - no further Rituxan needed at present time  - pt will be going to [place] shortly following hospital discharge - will have labs done prior to leaving, paper lab orders provided to her today, will arrange rheum f/u with Dr Escobar once back from [place] in late March so can facilitate next Rituxan dosing.   - Provide 1 month of medrol on discharge, my office will continue rx until she is seen by Dr Escobar. Pt has also been advised to find a rheumatologist where she will be in case of intermediate emergencies   --12/18-- Rheumatology labs will be ordered today will f/u with Rheumat for results post dc    #Fever, resolved   - Recent UTI on Bactrim (since DCd)  - UCX with enterococcus faecalis  - 101.4 fever overnight (12/4-12/5)  - Lactate of 2.5- patient refused IV fluids   - Start Zosyn IVPB 12/6 (pt agreeable)  - RVP negative  - F/U blood cultures, CXR  - ID consult and Pulm re-eval (no respiratory symptoms)  - Pulm recs: continue to monitor   - ID recs: CT chest- stable (12/5)   - Repeat Lactate of 3.1 - agreeable to IV fluids- s/p 1000mL bolus   - BL LE doppler- negative   --UTI--treated   Lactate downtrending, no active infection, no need for continued monitoring     #Cankre sore--resolved    #Knee buckling--knee strength improving  - Back muscle strain with catching self on RW   - Lidocaine patch to back and BL deltoids    #Experiencing R mandible and occipital numbness, with associated hair loss- outpatient follow up with Rheumatology    #Posttnasal drip--ENT review 11/7, declined scope, continue flonase     #pAFIB/Rt Ij thrombus  - Metoprolol 25mg BID and lovenox  ---Eliquis 5mg BID - tolerating well   -- RUE venous duplex check for resolution of know DVT 12/11    #Chronic Tinnitus   - ENT review and recs appreciate, Patient already made ENT appointment for f/u    #Lymphedema both legs -chronic and Rt IJ thrombus  - ACE Wrap BL LEs  - BL LE doppler negative for DVT  - BL UE doppler with chronic thrombotic changes in RIJ     #Transaminitis --LFT Down trending   - Secondary to acute illness and hypotension at [facility]  - Outpatient follow up     #Leucocytosis--steroid related, continue monitoring   # Elevated Lactate--downtrending 3.6 on 12/14 will monitor, no signs of acute infection   Down trending, no need for further monitoring    #Neuropathy  - Gabapentin 300 mg BID, will consider increasing dose, increase to TID 11/10  --Work on motor strengthening, priority for UE as preferred by patient   - Vit b12 >2,000 in 9/2023  -- Rhuematology review 12/14 labs recommended, will order prior to dc and f/u with Rheumat    #Sleep/Mood  - Melatonin 6mg at HS  - Psych rec Xanax 0.25mg PRN    #Skin  - Wound care recs--dry dressing over tiny surface area wound Rt groin, no need for skilled nursing care post dc   --Bruise left lower shin, resolving     #Pain Mgmt   - Tylenol PRN   - Gabapentin 300mg at HS     #GI/Bowel Mgmt/Hx of alternating bowel movements  - Pantoprazole  - PRN: Maalox   --Lactobacillus BID     #/Bladder Mgmt   -Spontaneously voiding   - UA (11/3) negative  - +UTI- Amoxicillin  completed  - 12/14---Elevated lactate--f/u repeat level and other labs     #FEN   #Dysphagia  - Diet - Minced & Moist  [CC]    - Dysphagia- SLP evaluation appreciated     #Health maintenance  - Folic Acid   - MVI  - Ocean nasal spray    # Difficulty with access to peripheral veins-- Blood draws from Left arm Medline --RUE F/U venous duplex 12/11    #Precautions / PROPHYLAXIS:   - Falls  - ortho: Weight bearing status: WBAT   - Lungs: Aspiration, Incentive Spirometer   - DVT PPX: Eliquis 5 mg BID   ----------------------------------------  12/11- --Labs CBC mild anemia, normal renal function, LFT elevated, downtrending overall unremarkable   12/14--* elevated lactate downtrending     Health maintenance--await response from insurance company for the expedited appeal regarding acute rehab treatment   ----------------------------------------  Liaison with other providers/agencies  12/8--Discussed with Respiratory, no need for repeat CT chest this time, last imaging wead 12/5, Resp will f/u with patient post d/c  ----------------------------------------  IDT conference on 12/19  Social Work: --   SLP: --  OT: Setup for eating/grooming. Sup for UBD. CG for LBD/toileting. Min A for shower & toilet transfer.   PT: CG/CS for all. 1 6 inch step with mod A x2. Car transfers with CGA.   RT: --  DME: WC     Barriers: --   TDD: 12/20 to home.  ----------------------------------------  OUTPATIENT/FOLLOW UP:    Trae Whitney  Pulmonary Disease  [address], [address] [place] [address]  Phone: [phone]  Fax: [phone]  Follow Up Time: 2 weeks    Ryanne Allen  Rheumatology  [address] [address]  Phone: [phone]  Fax: [phone]  Follow Up Time: 1 week    Kyle Pierce  Internal Medicine  [address] [place] [address], [address]  [place] [address]  Phone: [phone]  Fax: [phone]  Follow Up Time: 2 weeks    Addy Powers  Pulmonary Disease  [address] [facility], [address]  [place], [place] [address]  Phone: [phone]  Fax: [phone]  Follow Up Time:     Kwame Hawley  Critical Care Medicine  [address] [facility], [address]  [place] [address]  Phone: [phone]  Fax: [phone]  Follow Up Time:     Neena Borrego  Rheumatology  [address] [facility], [address]  [place] [address]  Phone: [phone]  Fax: [phone]  Follow Up Time:    [facility]  [facility] [address] [place] [address]  Scheduled Appointment: 09/13/2023  2:40 PM       Non critical care  Time spent 32 mins, discussed patient's progress, disharge plan and care co ordnation Assessment/Plan:  ALIZA FOLEY is a 62 year old female with PMH of pAFIB and sciatica; who presented to [facility] ED with SOB, and was found to have groundglass opacities and interstitial lung disease. She was treated with empiric Vancomycin and Zosyn. She underwent a bronchoscopy with bronchoalveolar lavage and pulse steroids. She was given IV diuretics for increased pulmonary congestion, but her respiratory status continued to worsen.  On 9/13, she was transferred to [facility] for ECMO requirements. She was further treated with Plasmapheresis, Rituximab and high-dose steroids, with significant improvement. Her overall hospital course was complicated by thrombocytopenia (s/p several platelet transfusions), leukocytosis (infectious workup entirely negative- s/p Vanco and Ceftriaxone), AFIB with RVR (resolved with Metoprolol), dysphagia (requiring NGT), transaminitis (secondary to acute illness and hypotension),  non-occlusive RIJ DVT (started on Lovenox). Patient now admitted for a multidisciplinary rehab program. 10-05-23 @ 13:18    * DC home today 12/20   * Sustained functional improvement--increasing ambulatory distance to 160 ft with RW, and functional distance with manual WC   * DC home today     #Interstitial Lung Disease and Mixed connective tissue disease  - Gait Instability, ADL impairments and Functional impairments: start Comprehensive Rehab Program of PT/OT/SLP - 3 hours a day, 5 days a week  - P&O as needed   - Non-specific Interstitial Lung Disease  - Requiring ECMO   - Improvement s/p Plasmapheresis, Rituximab and high- dose steroids   - Albuterol/Ipratropium Nebulizer every 6 hours  - Mepron 1500mg daily  - PO Medrol ( due to liver dysfunction): Plan will be to taper by ~20% every 2 weeks    Medrol   64mg x 2 weeks   56mg x 2 weeks  44mg x 2 weeks   36mg x 2 weeks - Follow up Outpatient   - Plan to wean 02 as tolerated, Continue tapering oxygen,  -  CT Chest noted 11/10---Resp f/u review  11/14, appreciated  They reports sustained improvement, clinical (normal oxy sats on R/A, sustained progress with oxy tapering), and radiological (no acute findings on recent CT Chest, rather residual chronic infiltrative diesease noted, with recommendation to repeat CT after Acute rehab treatment   Rheumat rec Dr Vasquez 12/14  - Taper to Medrol 28mg x 2 weeks then 20mg x 2 weeks, then can taper by 4mg qweekly - 16mg, 12mg, 8mg. Remain on 4mg/day until next clinic visit   - continue with PCP ppx with Mepron and GI ppx with PPI while on steroids   - no further Rituxan needed at present time  - pt will be going to [place] shortly following hospital discharge - will have labs done prior to leaving, paper lab orders provided to her today, will arrange rheum f/u with Dr Escobar once back from [place] in late March so can facilitate next Rituxan dosing.   - Provide 1 month of medrol on discharge, my office will continue rx until she is seen by Dr Escobar. Pt has also been advised to find a rheumatologist where she will be in case of intermediate emergencies     #Fever, resolved   - Recent UTI on Bactrim (since DCd)  - UCX with enterococcus faecalis  - 101.4 fever overnight (12/4-12/5)  - Lactate of 2.5- patient refused IV fluids   - Start Zosyn IVPB 12/6 (pt agreeable)  - RVP negative  - F/U blood cultures, CXR  - ID consult and Pulm re-eval (no respiratory symptoms)  - Pulm recs: continue to monitor   - ID recs: CT chest- stable (12/5)   - Repeat Lactate of 3.1 - agreeable to IV fluids- s/p 1000mL bolus   - BL LE doppler- negative   --UTI--treated   Lactate downtrending, no active infection, no need for continued monitoring     #Cankre sore--resolved    #Knee buckling--knee strength improving  - Back muscle strain with catching self on RW   - Lidocaine patch to back and BL deltoids    #Experiencing R mandible and occipital numbness, with associated hair loss- outpatient follow up with Rheumatology    #Posttnasal drip--ENT review 11/7, declined scope, continue flonase     #pAFIB/Rt Ij thrombus  - Metoprolol 25mg BID and lovenox  ---Eliquis 5mg BID - tolerating well   -- RUE venous duplex check for resolution of know DVT 12/11    #Chronic Tinnitus   - ENT review and recs appreciate, Patient already made ENT appointment for f/u    #Lymphedema both legs -chronic and Rt IJ thrombus  - ACE Wrap BL LEs  - BL LE doppler negative for DVT  - BL UE doppler with chronic thrombotic changes in RIJ     #Transaminitis --LFT Down trending   - Secondary to acute illness and hypotension at [facility]  - Outpatient follow up     #Leucocytosis--steroid related, continue monitoring   # Elevated Lactate--downtrending 3.6 on 12/14 will monitor, no signs of acute infection   Down trending, no need for further monitoring    #Neuropathy  - Gabapentin 300 mg BID, will consider increasing dose, increase to TID 11/10  --Work on motor strengthening, priority for UE as preferred by patient   - Vit b12 >2,000 in 9/2023  -- Rhuematology review 12/14 labs recommended, will order prior to dc and f/u with Rheumat    #Sleep/Mood  - Melatonin 6mg at HS  - Psych rec Xanax 0.25mg PRN    #Skin  - Wound care recs--dry dressing over tiny surface area wound Rt groin, no need for skilled nursing care post dc   --Bruise left lower shin, resolving     #Pain Mgmt   - Tylenol PRN   - Gabapentin 300mg at HS     #GI/Bowel Mgmt/Hx of alternating bowel movements  - Pantoprazole  - PRN: Maalox   --Lactobacillus BID     #/Bladder Mgmt   -Spontaneously voiding   - UA (11/3) negative  - +UTI- Amoxicillin  completed  - 12/14---Elevated lactate--f/u repeat level and other labs     #FEN   #Dysphagia  - Diet - Minced & Moist  [CC]    - Dysphagia- SLP evaluation appreciated     #Health maintenance  - Folic Acid   - MVI  - Ocean nasal spray    # Difficulty with access to peripheral veins-- Blood draws from Left arm Medline --RUE F/U venous duplex 12/11    #Precautions / PROPHYLAXIS:   - Falls  - ortho: Weight bearing status: WBAT   - Lungs: Aspiration, Incentive Spirometer   - DVT PPX: Eliquis 5 mg BID   ----------------------------------------  12/14--* elevated lactate downtrending   12/18--unremarkable     Health maintenance--await response from insurance company for the expedited appeal regarding acute rehab treatment   ----------------------------------------  Liaison with other providers/agencies  12/8--Discussed with Respiratory, no need for repeat CT chest this time, last imaging wead 12/5, Resp will f/u with patient post d/c  ----------------------------------------  IDT conference on 12/19  Social Work: -- n/a   SLP: --n/a   OT: Setup for eating/grooming. Sup for UBD. CG for LBD/toileting. Min A for shower & toilet transfer.   PT: CG/CS for all. 1 6 inch step with mod A x2. Car transfers with CGA.   RT: --  DME: WC     Barriers: --   TDD: 12/20 to home.  ----------------------------------------  OUTPATIENT/FOLLOW UP:    Tare Whitney  Pulmonary Disease  [address], [address] [place] [address]  Phone: [phone]  Fax: [phone]  Follow Up Time: 2 weeks    Ryanne Allen  Rheumatology  [address] [address]  Phone: [phone]  Fax: [phone]  Follow Up Time: 1 week    Kyle Pierce  Internal Medicine  [address] [place] [address], [address]  [place] [address]  Phone: [phone]  Fax: [phone]  Follow Up Time: 2 weeks    Addy Powers  Pulmonary Disease  [address] [facility], [address]  [place], [place] [address]  Phone: [phone]  Fax: [phone]  Follow Up Time:     Kwame Hawley  Critical Care Medicine  [address] [facility], [address]  [place] [address]  Phone: [phone]  Fax: [phone]  Follow Up Time:     Neena Borrego  Rheumatology  [address] [place] [address], [address] 3  [place] [address]  Phone: [phone]  Fax: [phone]  Follow Up Time:    [facility]  [place] [address] [address]  Scheduled Appointment: 09/13/2023  2:40 PM       Non critical care  Time spent 32 mins, discussed patient's progress, disharge plan and care co ordnation Assessment/Plan:  ALIZA FOLEY is a 62 year old female with PMH of pAFIB and sciatica; who presented to [facility] ED with SOB, and was found to have groundglass opacities and interstitial lung disease. She was treated with empiric Vancomycin and Zosyn. She underwent a bronchoscopy with bronchoalveolar lavage and pulse steroids. She was given IV diuretics for increased pulmonary congestion, but her respiratory status continued to worsen.  On 9/13, she was transferred to [facility] for ECMO requirements. She was further treated with Plasmapheresis, Rituximab and high-dose steroids, with significant improvement. Her overall hospital course was complicated by thrombocytopenia (s/p several platelet transfusions), leukocytosis (infectious workup entirely negative- s/p Vanco and Ceftriaxone), AFIB with RVR (resolved with Metoprolol), dysphagia (requiring NGT), transaminitis (secondary to acute illness and hypotension),  non-occlusive RIJ DVT (started on Lovenox). Patient now admitted for a multidisciplinary rehab program. 10-05-23 @ 13:18    * DC home today 12/20   * Sustained functional improvement--increasing ambulatory distance to 160 ft with RW, and functional distance with manual WC   * DC home today     #Interstitial Lung Disease and Mixed connective tissue disease  - Gait Instability, ADL impairments and Functional impairments: start Comprehensive Rehab Program of PT/OT/SLP - 3 hours a day, 5 days a week  - P&O as needed   - Non-specific Interstitial Lung Disease  - Requiring ECMO   - Improvement s/p Plasmapheresis, Rituximab and high- dose steroids   - Albuterol/Ipratropium Nebulizer every 6 hours  - Mepron 1500mg daily  - PO Medrol ( due to liver dysfunction): Plan will be to taper by ~20% every 2 weeks    Medrol   64mg x 2 weeks   56mg x 2 weeks  44mg x 2 weeks   36mg x 2 weeks - Follow up Outpatient   - Plan to wean 02 as tolerated, Continue tapering oxygen,  -  CT Chest noted 11/10---Resp f/u review  11/14, appreciated  They reports sustained improvement, clinical (normal oxy sats on R/A, sustained progress with oxy tapering), and radiological (no acute findings on recent CT Chest, rather residual chronic infiltrative diesease noted, with recommendation to repeat CT after Acute rehab treatment   Rheumat rec Dr Vasquez 12/14  - Taper to Medrol 28mg x 2 weeks then 20mg x 2 weeks, then can taper by 4mg qweekly - 16mg, 12mg, 8mg. Remain on 4mg/day until next clinic visit   - continue with PCP ppx with Mepron and GI ppx with PPI while on steroids   - no further Rituxan needed at present time  - pt will be going to [place] shortly following hospital discharge - will have labs done prior to leaving, paper lab orders provided to her today, will arrange rheum f/u with Dr Escobar once back from [place] in late March so can facilitate next Rituxan dosing.   - Provide 1 month of medrol on discharge, my office will continue rx until she is seen by Dr Escobar. Pt has also been advised to find a rheumatologist where she will be in case of intermediate emergencies     #Fever, resolved   - Recent UTI on Bactrim (since DCd)  - UCX with enterococcus faecalis  - 101.4 fever overnight (12/4-12/5)  - Lactate of 2.5- patient refused IV fluids   - Start Zosyn IVPB 12/6 (pt agreeable)  - RVP negative  - F/U blood cultures, CXR  - ID consult and Pulm re-eval (no respiratory symptoms)  - Pulm recs: continue to monitor   - ID recs: CT chest- stable (12/5)   - Repeat Lactate of 3.1 - agreeable to IV fluids- s/p 1000mL bolus   - BL LE doppler- negative   --UTI--treated   Lactate downtrending, no active infection, no need for continued monitoring     #Cankre sore--resolved    #Knee buckling--knee strength improving  - Back muscle strain with catching self on RW   - Lidocaine patch to back and BL deltoids    #Experiencing R mandible and occipital numbness, with associated hair loss- outpatient follow up with Rheumatology    #Posttnasal drip--ENT review 11/7, declined scope, continue flonase     #pAFIB/Rt Ij thrombus  - Metoprolol 25mg BID and lovenox  ---Eliquis 5mg BID - tolerating well   -- RUE venous duplex check for resolution of know DVT 12/11    #Chronic Tinnitus   - ENT review and recs appreciate, Patient already made ENT appointment for f/u    #Lymphedema both legs -chronic and Rt IJ thrombus  - ACE Wrap BL LEs  - BL LE doppler negative for DVT  - BL UE doppler with chronic thrombotic changes in RIJ     #Transaminitis --LFT Down trending   - Secondary to acute illness and hypotension at [facility]  - Outpatient follow up     #Leucocytosis--steroid related, continue monitoring   # Elevated Lactate--downtrending 3.6 on 12/14 will monitor, no signs of acute infection   Down trending, no need for further monitoring    #Neuropathy  - Gabapentin 300 mg BID, will consider increasing dose, increase to TID 11/10  --Work on motor strengthening, priority for UE as preferred by patient   - Vit b12 >2,000 in 9/2023  -- Rhuematology review 12/14 labs recommended, will order prior to dc and f/u with Rheumat    #Sleep/Mood  - Melatonin 6mg at HS  - Psych rec Xanax 0.25mg PRN    #Skin  - Wound care recs--dry dressing over tiny surface area wound Rt groin, no need for skilled nursing care post dc   --Bruise left lower shin, resolving     #Pain Mgmt   - Tylenol PRN   - Gabapentin 300mg at HS     #GI/Bowel Mgmt/Hx of alternating bowel movements  - Pantoprazole  - PRN: Maalox   --Lactobacillus BID     #/Bladder Mgmt   -Spontaneously voiding   - UA (11/3) negative  - +UTI- Amoxicillin  completed  - 12/14---Elevated lactate--f/u repeat level and other labs     #FEN   #Dysphagia  - Diet - Minced & Moist  [CC]    - Dysphagia- SLP evaluation appreciated     #Health maintenance  - Folic Acid   - MVI  - Ocean nasal spray    # Difficulty with access to peripheral veins-- Blood draws from Left arm Medline --RUE F/U venous duplex 12/11    #Precautions / PROPHYLAXIS:   - Falls  - ortho: Weight bearing status: WBAT   - Lungs: Aspiration, Incentive Spirometer   - DVT PPX: Eliquis 5 mg BID   ----------------------------------------  12/14--* elevated lactate downtrending   12/18--unremarkable     Health maintenance--await response from insurance company for the expedited appeal regarding acute rehab treatment   ----------------------------------------  Liaison with other providers/agencies  12/8--Discussed with Respiratory, no need for repeat CT chest this time, last imaging wead 12/5, Resp will f/u with patient post d/c  ----------------------------------------  IDT conference on 12/19  Social Work: -- n/a   SLP: --n/a   OT: Setup for eating/grooming. Sup for UBD. CG for LBD/toileting. Min A for shower & toilet transfer.   PT: CG/CS for all. 1 6 inch step with mod A x2. Car transfers with CGA.   RT: --  DME: WC     Barriers: --   TDD: 12/20 to home.  ----------------------------------------  OUTPATIENT/FOLLOW UP:    Trae Whitney  Pulmonary Disease  [address], [address] [place] [address]  Phone: [phone]  Fax: [phone]  Follow Up Time: 2 weeks    Ryanne Allen  Rheumatology  [address] [address]  Phone: [phone]  Fax: [phone]  Follow Up Time: 1 week    Kyle Pierce  Internal Medicine  [address] [place] [address], Floor 5  [place] [address]  Phone: [phone]  Fax: [phone]  Follow Up Time: 2 weeks    Addy Powers  Pulmonary Disease  [address] [facility], [address]  [place], [place] [id number]  Phone: [phone]  Fax: [phone]  Follow Up Time:     Kwame Hawley  Critical Care Medicine  [address] [facility], [address]  [place] [address]  Phone: [phone]  Fax: [phone]  Follow Up Time:     Neena Borrego  Rheumatology  [address] [place] [address], [address] 3  [place] [address]  Phone: [phone]  Fax: [phone]  Follow Up Time:    [facility]  [place] [address] [address]  Scheduled Appointment: 09/13/2023  2:40 PM       Non critical care  Time spent 32 mins, discussed patient's progress, disharge plan and care co ordnation

## 2023-12-20 NOTE — PROGRESS NOTE ADULT - NS ATTEND AMEND GEN_ALL_CORE FT
Seen and examined, note revised    Clinically stable  Back pain post therapy yesterday, responding to topical analgesics  Had normal bowel and bladder function    Discussed d/c plan  will be in [place] for next 2 wks  Will return for fitting of [facility]  Contact details given to facilitate this    DC Home today at 4 pm Seen and examined, note revised    Clinically stable  Back pain post therapy yesterday, responding to topical analgesics  Had normal bowel and bladder function    Discussed d/c plan  will be in [place] for next 2 wks  Will return for fitting of her new WC once delivered here at [facility]  Contact details given to facilitate this    DC Home today at 4 pm

## 2023-12-20 NOTE — PROGRESS NOTE ADULT - PROVIDER SPECIALTY LIST ADULT
Hospitalist
Neuro Rehabilitation
Pulmonology
Rehab Medicine
Rheumatology
Hospitalist
Neuro Rehabilitation
Neuropsychology
Neuropsychology
Physiatry
Physiatry
Pulmonology
Rehab Medicine
Hospitalist
Infectious Disease
Infectious Disease
Neuropsychology
Physiatry
Physiatry
Pulmonology
Rehab Medicine
Hospitalist
Neuropsychology
Neuropsychology
Physiatry
Pulmonology
Pulmonology
Rehab Medicine
Hospitalist
Infectious Disease
Infectious Disease
Physiatry
Pulmonology
Pulmonology
Rehab Medicine
Hospitalist
Hospitalist
Infectious Disease
Hospitalist
Hospitalist
Rehab Medicine

## 2023-12-21 LAB
ALDOLASE SERPL-CCNC: 6.5 U/L
CULTURE RESULTS: SIGNIFICANT CHANGE UP
DSDNA AB SER-ACNC: <12 IU/ML
SPECIMEN SOURCE: SIGNIFICANT CHANGE UP

## 2023-12-26 PROBLEM — J84.9 INTERSTITIAL PULMONARY DISEASE, UNSPECIFIED: Chronic | Status: ACTIVE | Noted: 2023-10-30

## 2023-12-26 PROBLEM — I89.0 LYMPHEDEMA, NOT ELSEWHERE CLASSIFIED: Chronic | Status: ACTIVE | Noted: 2023-10-30

## 2023-12-27 RX ORDER — CELECOXIB 200 MG/1
1 CAPSULE ORAL
Qty: 14 | Refills: 0
Start: 2023-12-27 | End: 2024-01-02

## 2023-12-31 ENCOUNTER — INPATIENT (INPATIENT)
Facility: HOSPITAL | Age: 63
LOS: 3 days | Discharge: HOME | End: 2024-01-04
Attending: INTERNAL MEDICINE | Admitting: INTERNAL MEDICINE
Payer: COMMERCIAL

## 2023-12-31 ENCOUNTER — EMERGENCY (EMERGENCY)
Facility: HOSPITAL | Age: 63
LOS: 1 days | Discharge: HOME | End: 2023-12-31
Attending: EMERGENCY MEDICINE | Admitting: EMERGENCY MEDICINE
Payer: COMMERCIAL

## 2023-12-31 VITALS
TEMPERATURE: 99 F | SYSTOLIC BLOOD PRESSURE: 118 MMHG | HEART RATE: 94 BPM | DIASTOLIC BLOOD PRESSURE: 73 MMHG | OXYGEN SATURATION: 94 % | HEIGHT: 68 IN | RESPIRATION RATE: 26 BRPM

## 2023-12-31 VITALS
SYSTOLIC BLOOD PRESSURE: 156 MMHG | DIASTOLIC BLOOD PRESSURE: 112 MMHG | WEIGHT: 266.1 LBS | HEART RATE: 107 BPM | OXYGEN SATURATION: 95 % | RESPIRATION RATE: 21 BRPM | TEMPERATURE: 99 F | HEIGHT: 68 IN

## 2023-12-31 VITALS
SYSTOLIC BLOOD PRESSURE: 117 MMHG | OXYGEN SATURATION: 95 % | DIASTOLIC BLOOD PRESSURE: 73 MMHG | RESPIRATION RATE: 18 BRPM | HEART RATE: 99 BPM

## 2023-12-31 DIAGNOSIS — R06.02 SHORTNESS OF BREATH: ICD-10-CM

## 2023-12-31 LAB
ALBUMIN SERPL ELPH-MCNC: 3.2 G/DL — LOW (ref 3.3–5)
ALBUMIN SERPL ELPH-MCNC: 3.2 G/DL — LOW (ref 3.3–5)
ALP SERPL-CCNC: 120 U/L — SIGNIFICANT CHANGE UP (ref 40–120)
ALP SERPL-CCNC: 120 U/L — SIGNIFICANT CHANGE UP (ref 40–120)
ALT FLD-CCNC: 47 U/L — HIGH (ref 10–45)
ALT FLD-CCNC: 47 U/L — HIGH (ref 10–45)
ANION GAP SERPL CALC-SCNC: 9 MMOL/L — SIGNIFICANT CHANGE UP (ref 5–17)
ANION GAP SERPL CALC-SCNC: 9 MMOL/L — SIGNIFICANT CHANGE UP (ref 5–17)
AST SERPL-CCNC: 22 U/L — SIGNIFICANT CHANGE UP (ref 10–40)
AST SERPL-CCNC: 22 U/L — SIGNIFICANT CHANGE UP (ref 10–40)
BASE EXCESS BLDV CALC-SCNC: 5.5 MMOL/L — HIGH (ref -2–3)
BASE EXCESS BLDV CALC-SCNC: 5.5 MMOL/L — HIGH (ref -2–3)
BASOPHILS # BLD AUTO: 0.05 K/UL — SIGNIFICANT CHANGE UP (ref 0–0.2)
BASOPHILS # BLD AUTO: 0.05 K/UL — SIGNIFICANT CHANGE UP (ref 0–0.2)
BASOPHILS NFR BLD AUTO: 0.4 % — SIGNIFICANT CHANGE UP (ref 0–2)
BASOPHILS NFR BLD AUTO: 0.4 % — SIGNIFICANT CHANGE UP (ref 0–2)
BILIRUB SERPL-MCNC: 1 MG/DL — SIGNIFICANT CHANGE UP (ref 0.2–1.2)
BILIRUB SERPL-MCNC: 1 MG/DL — SIGNIFICANT CHANGE UP (ref 0.2–1.2)
BLOOD GAS VENOUS COMPREHENSIVE RESULT: SIGNIFICANT CHANGE UP
BLOOD GAS VENOUS COMPREHENSIVE RESULT: SIGNIFICANT CHANGE UP
BUN SERPL-MCNC: 16 MG/DL — SIGNIFICANT CHANGE UP (ref 7–23)
BUN SERPL-MCNC: 16 MG/DL — SIGNIFICANT CHANGE UP (ref 7–23)
CALCIUM SERPL-MCNC: 9.7 MG/DL — SIGNIFICANT CHANGE UP (ref 8.4–10.5)
CALCIUM SERPL-MCNC: 9.7 MG/DL — SIGNIFICANT CHANGE UP (ref 8.4–10.5)
CHLORIDE BLDV-SCNC: 103 MMOL/L — SIGNIFICANT CHANGE UP (ref 96–108)
CHLORIDE BLDV-SCNC: 103 MMOL/L — SIGNIFICANT CHANGE UP (ref 96–108)
CHLORIDE SERPL-SCNC: 102 MMOL/L — SIGNIFICANT CHANGE UP (ref 96–108)
CHLORIDE SERPL-SCNC: 102 MMOL/L — SIGNIFICANT CHANGE UP (ref 96–108)
CO2 BLDV-SCNC: 31.9 MMOL/L — HIGH (ref 22–26)
CO2 BLDV-SCNC: 31.9 MMOL/L — HIGH (ref 22–26)
CO2 SERPL-SCNC: 30 MMOL/L — SIGNIFICANT CHANGE UP (ref 22–31)
CO2 SERPL-SCNC: 30 MMOL/L — SIGNIFICANT CHANGE UP (ref 22–31)
CREAT SERPL-MCNC: 0.58 MG/DL — SIGNIFICANT CHANGE UP (ref 0.5–1.3)
CREAT SERPL-MCNC: 0.58 MG/DL — SIGNIFICANT CHANGE UP (ref 0.5–1.3)
D DIMER BLD IA.RAPID-MCNC: <150 NG/ML DDU — SIGNIFICANT CHANGE UP
D DIMER BLD IA.RAPID-MCNC: <150 NG/ML DDU — SIGNIFICANT CHANGE UP
EGFR: 100 ML/MIN/1.73M2 — SIGNIFICANT CHANGE UP
EGFR: 100 ML/MIN/1.73M2 — SIGNIFICANT CHANGE UP
EOSINOPHIL # BLD AUTO: 0.01 K/UL — SIGNIFICANT CHANGE UP (ref 0–0.5)
EOSINOPHIL # BLD AUTO: 0.01 K/UL — SIGNIFICANT CHANGE UP (ref 0–0.5)
EOSINOPHIL NFR BLD AUTO: 0.1 % — SIGNIFICANT CHANGE UP (ref 0–6)
EOSINOPHIL NFR BLD AUTO: 0.1 % — SIGNIFICANT CHANGE UP (ref 0–6)
FLUAV AG NPH QL: SIGNIFICANT CHANGE UP
FLUAV AG NPH QL: SIGNIFICANT CHANGE UP
FLUBV AG NPH QL: SIGNIFICANT CHANGE UP
FLUBV AG NPH QL: SIGNIFICANT CHANGE UP
GAS PNL BLDV: 140 MMOL/L — SIGNIFICANT CHANGE UP (ref 136–145)
GAS PNL BLDV: 140 MMOL/L — SIGNIFICANT CHANGE UP (ref 136–145)
GLUCOSE BLDV-MCNC: 123 MG/DL — HIGH (ref 70–99)
GLUCOSE BLDV-MCNC: 123 MG/DL — HIGH (ref 70–99)
GLUCOSE SERPL-MCNC: 107 MG/DL — HIGH (ref 70–99)
GLUCOSE SERPL-MCNC: 107 MG/DL — HIGH (ref 70–99)
HCO3 BLDV-SCNC: 30 MMOL/L — HIGH (ref 22–29)
HCO3 BLDV-SCNC: 30 MMOL/L — HIGH (ref 22–29)
HCT VFR BLD CALC: 34.3 % — LOW (ref 34.5–45)
HCT VFR BLD CALC: 34.3 % — LOW (ref 34.5–45)
HCT VFR BLDA CALC: 31 % — LOW (ref 34.5–46.5)
HCT VFR BLDA CALC: 31 % — LOW (ref 34.5–46.5)
HGB BLD CALC-MCNC: 10.2 G/DL — LOW (ref 11.7–16.1)
HGB BLD CALC-MCNC: 10.2 G/DL — LOW (ref 11.7–16.1)
HGB BLD-MCNC: 10.7 G/DL — LOW (ref 11.5–15.5)
HGB BLD-MCNC: 10.7 G/DL — LOW (ref 11.5–15.5)
IMM GRANULOCYTES NFR BLD AUTO: 0.8 % — SIGNIFICANT CHANGE UP (ref 0–0.9)
IMM GRANULOCYTES NFR BLD AUTO: 0.8 % — SIGNIFICANT CHANGE UP (ref 0–0.9)
LACTATE BLDV-MCNC: 1.1 MMOL/L — SIGNIFICANT CHANGE UP (ref 0.5–2)
LACTATE BLDV-MCNC: 1.1 MMOL/L — SIGNIFICANT CHANGE UP (ref 0.5–2)
LYMPHOCYTES # BLD AUTO: 0.69 K/UL — LOW (ref 1–3.3)
LYMPHOCYTES # BLD AUTO: 0.69 K/UL — LOW (ref 1–3.3)
LYMPHOCYTES # BLD AUTO: 4.9 % — LOW (ref 13–44)
LYMPHOCYTES # BLD AUTO: 4.9 % — LOW (ref 13–44)
MAGNESIUM SERPL-MCNC: 1.7 MG/DL — SIGNIFICANT CHANGE UP (ref 1.6–2.6)
MAGNESIUM SERPL-MCNC: 1.7 MG/DL — SIGNIFICANT CHANGE UP (ref 1.6–2.6)
MCHC RBC-ENTMCNC: 24.8 PG — LOW (ref 27–34)
MCHC RBC-ENTMCNC: 24.8 PG — LOW (ref 27–34)
MCHC RBC-ENTMCNC: 31.2 GM/DL — LOW (ref 32–36)
MCHC RBC-ENTMCNC: 31.2 GM/DL — LOW (ref 32–36)
MCV RBC AUTO: 79.4 FL — LOW (ref 80–100)
MCV RBC AUTO: 79.4 FL — LOW (ref 80–100)
MONOCYTES # BLD AUTO: 1.18 K/UL — HIGH (ref 0–0.9)
MONOCYTES # BLD AUTO: 1.18 K/UL — HIGH (ref 0–0.9)
MONOCYTES NFR BLD AUTO: 8.4 % — SIGNIFICANT CHANGE UP (ref 2–14)
MONOCYTES NFR BLD AUTO: 8.4 % — SIGNIFICANT CHANGE UP (ref 2–14)
NEUTROPHILS # BLD AUTO: 12.08 K/UL — HIGH (ref 1.8–7.4)
NEUTROPHILS # BLD AUTO: 12.08 K/UL — HIGH (ref 1.8–7.4)
NEUTROPHILS NFR BLD AUTO: 85.4 % — HIGH (ref 43–77)
NEUTROPHILS NFR BLD AUTO: 85.4 % — HIGH (ref 43–77)
NRBC # BLD: 0 /100 WBCS — SIGNIFICANT CHANGE UP (ref 0–0)
NRBC # BLD: 0 /100 WBCS — SIGNIFICANT CHANGE UP (ref 0–0)
NT-PROBNP SERPL-SCNC: 177 PG/ML — SIGNIFICANT CHANGE UP (ref 0–300)
NT-PROBNP SERPL-SCNC: 177 PG/ML — SIGNIFICANT CHANGE UP (ref 0–300)
PCO2 BLDV: 46 MMHG — SIGNIFICANT CHANGE UP (ref 39–52)
PCO2 BLDV: 46 MMHG — SIGNIFICANT CHANGE UP (ref 39–52)
PH BLDV: 7.43 — SIGNIFICANT CHANGE UP (ref 7.32–7.43)
PH BLDV: 7.43 — SIGNIFICANT CHANGE UP (ref 7.32–7.43)
PHOSPHATE SERPL-MCNC: 3.5 MG/DL — SIGNIFICANT CHANGE UP (ref 2.5–4.5)
PHOSPHATE SERPL-MCNC: 3.5 MG/DL — SIGNIFICANT CHANGE UP (ref 2.5–4.5)
PLATELET # BLD AUTO: 304 K/UL — SIGNIFICANT CHANGE UP (ref 150–400)
PLATELET # BLD AUTO: 304 K/UL — SIGNIFICANT CHANGE UP (ref 150–400)
PO2 BLDV: 45 MMHG — SIGNIFICANT CHANGE UP (ref 25–45)
PO2 BLDV: 45 MMHG — SIGNIFICANT CHANGE UP (ref 25–45)
POTASSIUM BLDV-SCNC: 3.5 MMOL/L — SIGNIFICANT CHANGE UP (ref 3.5–5.1)
POTASSIUM BLDV-SCNC: 3.5 MMOL/L — SIGNIFICANT CHANGE UP (ref 3.5–5.1)
POTASSIUM SERPL-MCNC: 3.5 MMOL/L — SIGNIFICANT CHANGE UP (ref 3.5–5.3)
POTASSIUM SERPL-MCNC: 3.5 MMOL/L — SIGNIFICANT CHANGE UP (ref 3.5–5.3)
POTASSIUM SERPL-SCNC: 3.5 MMOL/L — SIGNIFICANT CHANGE UP (ref 3.5–5.3)
POTASSIUM SERPL-SCNC: 3.5 MMOL/L — SIGNIFICANT CHANGE UP (ref 3.5–5.3)
PROT SERPL-MCNC: 6.1 G/DL — SIGNIFICANT CHANGE UP (ref 6–8.3)
PROT SERPL-MCNC: 6.1 G/DL — SIGNIFICANT CHANGE UP (ref 6–8.3)
RBC # BLD: 4.32 M/UL — SIGNIFICANT CHANGE UP (ref 3.8–5.2)
RBC # BLD: 4.32 M/UL — SIGNIFICANT CHANGE UP (ref 3.8–5.2)
RBC # FLD: 16.5 % — HIGH (ref 10.3–14.5)
RBC # FLD: 16.5 % — HIGH (ref 10.3–14.5)
RSV RNA NPH QL NAA+NON-PROBE: SIGNIFICANT CHANGE UP
RSV RNA NPH QL NAA+NON-PROBE: SIGNIFICANT CHANGE UP
SAO2 % BLDV: 67.4 % — SIGNIFICANT CHANGE UP (ref 67–88)
SAO2 % BLDV: 67.4 % — SIGNIFICANT CHANGE UP (ref 67–88)
SARS-COV-2 RNA SPEC QL NAA+PROBE: SIGNIFICANT CHANGE UP
SARS-COV-2 RNA SPEC QL NAA+PROBE: SIGNIFICANT CHANGE UP
SODIUM SERPL-SCNC: 141 MMOL/L — SIGNIFICANT CHANGE UP (ref 135–145)
SODIUM SERPL-SCNC: 141 MMOL/L — SIGNIFICANT CHANGE UP (ref 135–145)
TROPONIN I, HIGH SENSITIVITY RESULT: 16.7 NG/L — SIGNIFICANT CHANGE UP
TROPONIN I, HIGH SENSITIVITY RESULT: 16.7 NG/L — SIGNIFICANT CHANGE UP
WBC # BLD: 14.12 K/UL — HIGH (ref 3.8–10.5)
WBC # BLD: 14.12 K/UL — HIGH (ref 3.8–10.5)
WBC # FLD AUTO: 14.12 K/UL — HIGH (ref 3.8–10.5)
WBC # FLD AUTO: 14.12 K/UL — HIGH (ref 3.8–10.5)

## 2023-12-31 PROCEDURE — 93005 ELECTROCARDIOGRAM TRACING: CPT

## 2023-12-31 PROCEDURE — 96374 THER/PROPH/DIAG INJ IV PUSH: CPT

## 2023-12-31 PROCEDURE — 87637 SARSCOV2&INF A&B&RSV AMP PRB: CPT

## 2023-12-31 PROCEDURE — 85025 COMPLETE CBC W/AUTO DIFF WBC: CPT

## 2023-12-31 PROCEDURE — 71045 X-RAY EXAM CHEST 1 VIEW: CPT | Mod: 26

## 2023-12-31 PROCEDURE — 99285 EMERGENCY DEPT VISIT HI MDM: CPT | Mod: 25

## 2023-12-31 PROCEDURE — 84100 ASSAY OF PHOSPHORUS: CPT

## 2023-12-31 PROCEDURE — 36415 COLL VENOUS BLD VENIPUNCTURE: CPT

## 2023-12-31 PROCEDURE — 84484 ASSAY OF TROPONIN QUANT: CPT

## 2023-12-31 PROCEDURE — 71250 CT THORAX DX C-: CPT | Mod: MA

## 2023-12-31 PROCEDURE — 99222 1ST HOSP IP/OBS MODERATE 55: CPT

## 2023-12-31 PROCEDURE — 94640 AIRWAY INHALATION TREATMENT: CPT

## 2023-12-31 PROCEDURE — 93010 ELECTROCARDIOGRAM REPORT: CPT

## 2023-12-31 PROCEDURE — 80053 COMPREHEN METABOLIC PANEL: CPT

## 2023-12-31 PROCEDURE — 71250 CT THORAX DX C-: CPT | Mod: 26,MA

## 2023-12-31 PROCEDURE — 71045 X-RAY EXAM CHEST 1 VIEW: CPT

## 2023-12-31 PROCEDURE — 99285 EMERGENCY DEPT VISIT HI MDM: CPT

## 2023-12-31 PROCEDURE — 83880 ASSAY OF NATRIURETIC PEPTIDE: CPT

## 2023-12-31 PROCEDURE — 83735 ASSAY OF MAGNESIUM: CPT

## 2023-12-31 RX ORDER — DIPHENHYDRAMINE HCL 50 MG
50 CAPSULE ORAL EVERY 12 HOURS
Refills: 0 | Status: DISCONTINUED | OUTPATIENT
Start: 2024-01-01 | End: 2024-01-01

## 2023-12-31 RX ORDER — DEXAMETHASONE 0.5 MG/5ML
10 ELIXIR ORAL ONCE
Refills: 0 | Status: COMPLETED | OUTPATIENT
Start: 2023-12-31 | End: 2023-12-31

## 2023-12-31 RX ORDER — DEXAMETHASONE 0.5 MG/5ML
15 ELIXIR ORAL ONCE
Refills: 0 | Status: DISCONTINUED | OUTPATIENT
Start: 2023-12-31 | End: 2023-12-31

## 2023-12-31 RX ORDER — DIPHENHYDRAMINE HCL 50 MG
50 CAPSULE ORAL ONCE
Refills: 0 | Status: COMPLETED | OUTPATIENT
Start: 2023-12-31 | End: 2023-12-31

## 2023-12-31 RX ORDER — FAMOTIDINE 10 MG/ML
30 INJECTION INTRAVENOUS EVERY 12 HOURS
Refills: 0 | Status: DISCONTINUED | OUTPATIENT
Start: 2024-01-01 | End: 2024-01-01

## 2023-12-31 RX ORDER — DEXAMETHASONE 0.5 MG/5ML
10 ELIXIR ORAL THREE TIMES A DAY
Refills: 0 | Status: DISCONTINUED | OUTPATIENT
Start: 2024-01-01 | End: 2024-01-01

## 2023-12-31 RX ORDER — ACETAMINOPHEN 500 MG
1000 TABLET ORAL ONCE
Refills: 0 | Status: COMPLETED | OUTPATIENT
Start: 2023-12-31 | End: 2023-12-31

## 2023-12-31 RX ORDER — IPRATROPIUM/ALBUTEROL SULFATE 18-103MCG
3 AEROSOL WITH ADAPTER (GRAM) INHALATION ONCE
Refills: 0 | Status: COMPLETED | OUTPATIENT
Start: 2023-12-31 | End: 2023-12-31

## 2023-12-31 RX ORDER — FAMOTIDINE 10 MG/ML
20 INJECTION INTRAVENOUS ONCE
Refills: 0 | Status: COMPLETED | OUTPATIENT
Start: 2023-12-31 | End: 2023-12-31

## 2023-12-31 RX ORDER — FUROSEMIDE 40 MG
40 TABLET ORAL ONCE
Refills: 0 | Status: COMPLETED | OUTPATIENT
Start: 2023-12-31 | End: 2023-12-31

## 2023-12-31 RX ADMIN — Medication 1000 MILLIGRAM(S): at 14:56

## 2023-12-31 RX ADMIN — FAMOTIDINE 20 MILLIGRAM(S): 10 INJECTION INTRAVENOUS at 20:44

## 2023-12-31 RX ADMIN — Medication 40 MILLIGRAM(S): at 03:08

## 2023-12-31 RX ADMIN — Medication 50 MILLIGRAM(S): at 20:14

## 2023-12-31 RX ADMIN — Medication 102 MILLIGRAM(S): at 21:20

## 2023-12-31 RX ADMIN — Medication 3 MILLILITER(S): at 03:08

## 2023-12-31 RX ADMIN — Medication 400 MILLIGRAM(S): at 14:26

## 2023-12-31 RX ADMIN — Medication 20 MILLIGRAM(S): at 14:26

## 2023-12-31 RX ADMIN — Medication 1000 MILLIGRAM(S): at 14:26

## 2023-12-31 NOTE — ED PROVIDER NOTE - NSFOLLOWUPINSTRUCTIONS_ED_ALL_ED_FT
-- You should update your primary care physician on your Emergency Department visit and follow up with them.  If you do not have a physician or have difficulty following up, please call: [phone] to obtain a [facility] doctor or specialist who can provide follow up.    -- Return to the ER for worsening or persistent symptoms, and/or ANY NEW OR CONCERNING SYMPTOMS. -- You should update your primary care physician on your Emergency Department visit and follow up with them.  If you do not have a physician or have difficulty following up, please call: [phone] to obtain a [facility] doctor or specialist who can provide follow up.    -- Return to the ER for worsening or persistent symptoms, and/or ANY NEW OR CONCERNING SYMPTOMS.

## 2023-12-31 NOTE — ED PROVIDER NOTE - PROGRESS NOTE DETAILS
Rakan Narvaez MD, PGY2  Discussed with ENT regarding patients difficulty tolerating secretions. They are in emergent case currently, but will evaluate patient. Pt states she was unable to take her 20mg methylprednisolone earlier today due to secretions, will give IV dose in ED. Stating she is also having mild frontal headache, will give IV tylenol. Awaiting to her back from pulmonary team. Esvin PGY3   Patient signed out to me pending ENT evaluation and admission. In summary 63F with history of interstitial lung disease and recent admission for hypoxic respiratory failure requiring ECMO and prolonged intubation presented as transfer from [place] for ENT/pulm evaluation. Discussed with ENT - recommends treatment of angioedema given arytenoid edema found on scope and plan to re-scope in 6 hours. Updated patient and wife at bedside. Discussed with hospitalist - will admit for further management. Esvin PGY3   Patient signed out to me pending ENT evaluation and admission. In summary 63F with history of interstitial lung disease and recent admission for hypoxic respiratory failure requiring ECMO and prolonged intubation presented as transfer from [place] for ENT/pulm evaluation. Discussed with ENT - recommends treatment of angioedema given arytenoid edema found on scope and plan to re-scope in 6 hours. Updated patient and wife at bedside. Discussed with hospitalist - will admit for further management.

## 2023-12-31 NOTE — ED PROVIDER NOTE - CLINICAL SUMMARY MEDICAL DECISION MAKING FREE TEXT BOX
64 y/o F with h/o intersitial lung disease, s/p ECMO, recently discharge from hospital presents to Ed c/o worsening SOB with clear sputum for past few hours. She was seen by home visiting doc and was tested for strep, covid and flu and all were neg. no fever. but c/o she can not lay down. On exam pt was in severe distress with frothy sputum, + wheezing and coarse rales on exam, clinically looked like pulmonary edema, was given duoneb and IV Lasix and she improved, able to speak and cough and distressed resolved, pt was offered admission but as pt as prolong hospital course recently she wants to wait for admission , if she improves and feels comfortable to go home she would like to go home, if not she will stay in hospital. 62 y/o F with h/o intersitial lung disease, s/p ECMO, recently discharge from hospital presents to Ed c/o worsening SOB with clear sputum for past few hours. She was seen by home visiting doc and was tested for strep, covid and flu and all were neg. no fever. but c/o she can not lay down. On exam pt was in severe distress with frothy sputum, + wheezing and coarse rales on exam, clinically looked like pulmonary edema, was given duoneb and IV Lasix and she improved, able to speak and cough and distressed resolved, pt was offered admission but as pt as prolong hospital course recently she wants to wait for admission , if she improves and feels comfortable to go home she would like to go home, if not she will stay in hospital.    Ahmet: pt offered admission for endoscopy but pt states that she prefers to go to [facility] where her doctors are. 62 y/o F with h/o intersitial lung disease, s/p ECMO, recently discharge from hospital presents to Ed c/o worsening SOB with clear sputum for past few hours. She was seen by home visiting doc and was tested for strep, covid and flu and all were neg. no fever. but c/o she can not lay down. On exam pt was in severe distress with frothy sputum, + wheezing and coarse rales on exam, clinically looked like pulmonary edema, was given duoneb and IV Lasix and she improved, able to speak and cough and distressed resolved, pt was offered admission but as pt as prolong hospital course recently she wants to wait for admission , if she improves and feels comfortable to go home she would like to go home, if not she will stay in hospital.    Ahmet: pt offered admission for endoscopy but pt states that she prefers to go to [facility] where her doctors are.

## 2023-12-31 NOTE — ED ADULT NURSE NOTE - OBJECTIVE STATEMENT
pt aaox3, came in to ED complaining of sore throat & difficulty swallowing. denies chest pain or sob.

## 2023-12-31 NOTE — ED PROVIDER NOTE - PATIENT PORTAL LINK FT
You can access the FollowMyHealth Patient Portal offered by [facility] by registering at the following website: [email or web address]. By joining [facility]’s FollowMyHealth portal, you will also be able to view your health information using other applications (apps) compatible with our system. You can access the FollowMyHealth Patient Portal offered by [facility] by registering at the following website: [email or web address]. By joining [facility]’s FollowMyHealth portal, you will also be able to view your health information using other applications (apps) compatible with our system.

## 2023-12-31 NOTE — CONSULT NOTE ADULT - ATTENDING COMMENTS
62 year old female with IPAF in the setting of MCTD (+ARIANA, +RNP) and a complicated recent admission for acute hypoxic respiratory failure at [facility] in the setting of ILD exacerbation? with initial steroid response c/b progressive hypoxemic and hypercapnic respiratory failure requiring intubation and VV- ECMO on 9/13 in the setting of steroid taper, then transferred to [facility] with differential at that time considering DAH vs ILD flare. She was decannulated from VV-ECMO 9/21, extubated 9/22. She received pulse steroids, PLEX (9/15, 9/18, 9/20) and Rituximab (9/16 & 10/3). Her course c/b severe leukopenia s/p filgastrim, shock liver with slow to resolved hyperbilirubinemia, RIJ DVT, oropharyngeal dysphagia, and recurrent SVT.   She clinically improved and was discharged to  rehab. She was recently discharged (~2 days ago) on a medrol taper. While at home, she developed severe dysphagia and aspiration with any oral intake.     On our exam, there were notable referred upper airway sounds. There were faint intermittent inspiratory crackles at the bases. Her lungs were otherwise clear. No appreciable stridor. She had a breathy voice and often stopped to take a breath, which she explained was different than her normal. No appreciable plaques or infiltrates on her oropharyngeal exam. She was saturating 97% on room air. She had a normal respiratory rate and no increased work of breathing.    Her chest imaging was largely unchanged from 12/5 and significantly improved from months prior in 9/2023.     Since we saw her ENT has scoped the patient and noted edematous arytenoids concerning for angioedema. Unclear etiology. She has been on high dose steroids. And is at risk for atypical infections.     - Would sent strep swab  - RVP   - Would send procalcitonin, ESR, CRP  - Please obtain S/S evaluation  - Appreciate ENT input with this challenging case 62 year old female with IPAF in the setting of MCTD (+ARIANA, +RNP) and a complicated recent admission for acute hypoxic respiratory failure at [facility] in the setting of ILD exacerbation? with initial steroid response c/b progressive hypoxemic and hypercapnic respiratory failure requiring intubation and VV- ECMO on 9/13 in the setting of steroid taper, then transferred to [facility] with differential at that time considering DAH vs ILD flare. She was decannulated from VV-ECMO 9/21, extubated 9/22. She received pulse steroids, PLEX (9/15, 9/18, 9/20) and Rituximab (9/16 & 10/3). Her course c/b severe leukopenia s/p filgastrim, shock liver with slow to resolved hyperbilirubinemia, RIJ DVT, oropharyngeal dysphagia, and recurrent SVT.   She clinically improved and was discharged to  rehab. She was recently discharged (~2 days ago) on a medrol taper. While at home, she developed severe dysphagia and aspiration with any oral intake.     On our exam, there were notable referred upper airway sounds. There were faint intermittent inspiratory crackles at the bases. Her lungs were otherwise clear. No appreciable stridor. She had a breathy voice and often stopped to take a breath, which she explained was different than her normal. No appreciable plaques or infiltrates on her oropharyngeal exam. She was saturating 97% on room air. She had a normal respiratory rate and no increased work of breathing.    Her chest imaging was largely unchanged from 12/5 and significantly improved from months prior in 9/2023.     Since we saw her ENT has scoped the patient and noted edematous arytenoids concerning for angioedema. Unclear etiology. She has been on high dose steroids. And is at risk for atypical infections.     - Would sent strep swab  - RVP   - Would send procalcitonin, ESR, CRP  - Please obtain S/S evaluation  - Appreciate ENT input with this challenging case

## 2023-12-31 NOTE — ED ADULT NURSE NOTE - BIRTH SEX
One of the thyroid nodules has increased in size - when is she having the thyroid uptake and scan ?
Patient has been informed and will have labs done before scan
Please have tsh, free t4 and t3 done the day before scan
Radiology called with significant finding on Thyroid ultrasound
Uptake is schedule for 03/03
Female

## 2023-12-31 NOTE — ED ADULT NURSE NOTE - NSFALLUNIVINTERV_ED_ALL_ED
Bed/Stretcher in lowest position, wheels locked, appropriate side rails in place/Call bell, personal items and telephone in reach/Instruct patient to call for assistance before getting out of bed/chair/stretcher/Non-slip footwear applied when patient is off stretcher/[place] to call system/Physically safe environment - no spills, clutter or unnecessary equipment/Purposeful proactive rounding/Room/bathroom lighting operational, light cord in reach Bed/Stretcher in lowest position, wheels locked, appropriate side rails in place/Call bell, personal items and telephone in reach/Instruct patient to call for assistance before getting out of bed/chair/stretcher/Non-slip footwear applied when patient is off stretcher/[place] to call system/Physically safe environment - no spills, clutter or unnecessary equipment/Purposeful proactive rounding/Room/bathroom lighting operational, light cord in reach

## 2023-12-31 NOTE — ED PROVIDER NOTE - PHYSICAL EXAMINATION
Gen: NAD, gurgling breath sounds. No drooling.   HEENT:  mucous membranes moist. Airway patent. Uvula midline   CV: RRR, +S1/S2, no M/R/G, 2+ radial pulses b/l  Resp: Rhonchorous lung sounds b/l. No increased work of breathing.   GI: Abdomen NTTP  MSK: no LE edema  Neuro: A&Ox4, following commands, moving all four extremities spontaneously  Psych: appropriate mood

## 2023-12-31 NOTE — CONSULT NOTE ADULT - SUBJECTIVE AND OBJECTIVE BOX
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 S/p pulse steroids, PLEX (9/15, 9/18, 9/20) and Rituximab (9/16 & 10/3). Course c/b severe leukopenia s/p filgastrim, shock liver with slow to resolved hyperbilirubinemia, RIJ DVT, oropharyngeal dysphagia, and recurrent SVT. Repeat CT chest on 9/30 with markedly improved bilateral groundglass opacities with resolved lung consolidations. Had been at rehab at , recently discharged, receiving tapering dose of medrol.   She presented to  today for worsening shortness of breath, cough, difficulty tolerating secretions x 1 to 2 days. Had difficulty taking augmentin yesterday. Presented to [place] emergency department earlier today and was given IV Lasix, single DuoNeb with improvement of symptoms. CT of chest largely unchanged from prior 12/5. She also notes voice hoarseness today. Coughed after taking a sip of water today during interview.       PAST MEDICAL & SURGICAL HISTORY:  Afib      Sciatica      Interstitial lung disease      Lymphedema          FAMILY HISTORY:      SOCIAL HISTORY:  Smoking: no    Allergies    No Known Allergies    Intolerances        HOME MEDICATIONS:  Home Medications:  acetaminophen 325 mg oral tablet: 2 tab(s) orally every 6 hours As needed Temp greater or equal to 38C (100.4F), Mild Pain (1 - 3), Moderate Pain (4 - 6) (18 Dec 2023 13:30)  aluminum hydroxide-magnesium hydroxide 200 mg-200 mg/5 mL oral suspension: 30 milliliter(s) orally every 4 hours As needed Dyspepsia (25 Oct 2023 15:04)  ammonium lactate 12% topical lotion: 1 Apply topically to affected area every 12 hours As needed BL feet dryness (18 Dec 2023 13:30)  ascorbic acid 500 mg oral tablet: 1 tab(s) orally once a day (25 Oct 2023 15:04)  bisacodyl 5 mg oral delayed release tablet: 1 tab(s) orally once a day As needed Constipation (18 Dec 2023 13:30)  folic acid 1 mg oral tablet: 1 tab(s) orally once a day (25 Oct 2023 15:04)  lactobacillus acidophilus oral capsule: 1 cap(s) orally 2 times a day (25 Oct 2023 15:04)  lidocaine 4% topical film: Apply topically to affected area once a day Apply patches to affected area from 8AM-8PM. Okay to substitute OTC Salonpas patches. (18 Dec 2023 13:30)  loperamide 2 mg oral capsule: 1 cap(s) orally 3 times a day As needed Diarrhea (25 Oct 2023 15:04)  melatonin 3 mg oral tablet: 2 tab(s) orally once a day (at bedtime) (25 Oct 2023 15:04)  menthol-benzocaine topical: 1 lozenge orally 2 times a day (18 Dec 2023 13:30)  Multiple Vitamins oral tablet: 1 tab(s) orally once a day (25 Oct 2023 15:04)  ocular lubricant ophthalmic solution: 1 drop(s) to each affected eye every 12 hours (25 Oct 2023 15:04)  polyethylene glycol 3350 oral powder for reconstitution: 17 gram(s) orally once a day (18 Dec 2023 13:30)  psyllium 3.4 g/7 g oral powder for reconstitution: 1 packet(s) orally once a day (25 Oct 2023 15:04)  senna leaf extract oral tablet: 2 tab(s) orally once a day (at bedtime) (25 Oct 2023 15:04)  sodium chloride 0.65% nasal spray: 1 spray(s) nasal 3 times a day (25 Oct 2023 15:04)  zinc oxide 40% topical ointment: Apply topically to affected area 2 times a day (25 Oct 2023 15:04)      REVIEW OF SYSTEMS:  Constitutional: [x ] negative [ ] fevers [ ] chills [ ] weight loss [ ] weight gain  HEENT: [ ] negative [x ] sore throat [ ] eye irritation [ ] postnasal drip [ ] nasal congestion  CV: [ x] negative  [ ] chest pain [ ] orthopnea [ ] palpitations [ ] murmur  Resp: [ ] negative [ x] cough [x ] shortness of breath [ ] dyspnea [ ] wheezing [ ] sputum [ ] hemoptysis  GI: [x ] negative [ ] nausea [ ] vomiting [ ] diarrhea [ ] constipation [ ] abd pain [ ] dysphagia   : [ ] negative [ ] dysuria [ ] nocturia [ ] hematuria [ ] increased urinary frequency  Musculoskeletal: [ ] negative [ ] back pain [ ] myalgias [ ] arthralgias [ ] fracture  Skin: [ ] negative [ ] rash [ ] itch  Neurological: [ ] negative [ ] headache [ ] dizziness [ ] syncope [ ] weakness [ ] numbness  Psychiatric: [ ] negative [ ] anxiety [ ] depression  Endocrine: [ ] negative [ ] diabetes [ ] thyroid problem  Hematologic/Lymphatic: [ ] negative [ ] anemia [ ] bleeding problem  Allergic/Immunologic: [ ] negative [ ] itchy eyes [ ] nasal discharge [ ] hives [ ] angioedema  [ x] All other systems negative  [ ] Unable to assess ROS because ________    OBJECTIVE:  ICU Vital Signs Last 24 Hrs  T(C): 37.2 (31 Dec 2023 16:31), Max: 37.4 (31 Dec 2023 10:59)  T(F): 98.9 (31 Dec 2023 16:31), Max: 99.3 (31 Dec 2023 10:59)  HR: 95 (31 Dec 2023 16:31) (88 - 108)  BP: 125/62 (31 Dec 2023 16:31) (117/73 - 156/112)  BP(mean): --  ABP: --  ABP(mean): --  RR: 28 (31 Dec 2023 16:31) (18 - 28)  SpO2: 94% (31 Dec 2023 16:31) (94% - 99%)    O2 Parameters below as of 31 Dec 2023 16:31  Patient On (Oxygen Delivery Method): room air              CAPILLARY BLOOD GLUCOSE          PHYSICAL EXAM:  Gen: NAD  HEENT: hoarse voice, coughed after drinking water during our evaluation. Upper airway high pitched expiratory sounds  Neck: No JVP elevation  CV: RRR, no m/r/g  Lung: bibasilar rales  Abd: Soft, NT, ND  Ext: lower ext edema  Skin: No rash  Neuro: A&Ox3, no focal deficits    HOSPITAL MEDICATIONS:  Standing Meds:  dexAMETHasone  IVPB 10 milliGRAM(s) IV Intermittent Once  diphenhydrAMINE Injectable 50 milliGRAM(s) IV Push once  famotidine Injectable 20 milliGRAM(s) IV Push once      PRN Meds:      LABS:    The Labs were reviewed by me   The Radiology was reviewed by me    EKG tracing reviewed by me    12-31    141  |  102  |  16  ----------------------------<  107<H>  3.5   |  30  |  0.58    Ca    9.7      31 Dec 2023 03:00  Phos  3.5     12-31  Mg     1.7     12-31    TPro  6.1  /  Alb  3.2<L>  /  TBili  1.0  /  DBili  x   /  AST  22  /  ALT  47<H>  /  AlkPhos  120  12-31    Magnesium: 1.7 mg/dL (12-31-23 @ 03:00)    Phosphorus: 3.5 mg/dL (12-31-23 @ 03:00)                    Urinalysis Basic - ( 31 Dec 2023 03:00 )    Color: x / Appearance: x / SG: x / pH: x  Gluc: 107 mg/dL / Ketone: x  / Bili: x / Urobili: x   Blood: x / Protein: x / Nitrite: x   Leuk Esterase: x / RBC: x / WBC x   Sq Epi: x / Non Sq Epi: x / Bacteria: x                              10.7   14.12 )-----------( 304      ( 31 Dec 2023 03:00 )             34.3     CAPILLARY BLOOD GLUCOSE            MICROBIOLOGY:       RADIOLOGY:  [ ] Reviewed and interpreted by me    PULMONARY FUNCTION TESTS:    EKG:   CHIEF COMPLAINT:Patient is a 63y old  Female who presents with a chief complaint of     HPI:  62F PMH A-Fib with 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 S/p pulse steroids, PLEX (9/15, 9/18, 9/20) and Rituximab (9/16 & 10/3). Course c/b severe leukopenia s/p filgastrim, shock liver with slow to resolved hyperbilirubinemia, RIJ DVT, oropharyngeal dysphagia, and recurrent SVT. Repeat CT chest on 9/30 with markedly improved bilateral groundglass opacities with resolved lung consolidations. Had been at rehab at , recently discharged, receiving tapering dose of medrol.   She presented to  today for worsening shortness of breath, cough, difficulty tolerating secretions x 1 to 2 days. Had difficulty taking augmentin yesterday. Presented to [place] emergency department earlier today and was given IV Lasix, single DuoNeb with improvement of symptoms. CT of chest largely unchanged from prior 12/5. She also notes voice hoarseness today. Coughed after taking a sip of water today during interview.       PAST MEDICAL & SURGICAL HISTORY:  Afib      Sciatica      Interstitial lung disease      Lymphedema          FAMILY HISTORY:      SOCIAL HISTORY:  Smoking: no    Allergies    No Known Allergies    Intolerances        HOME MEDICATIONS:  Home Medications:  acetaminophen 325 mg oral tablet: 2 tab(s) orally every 6 hours As needed Temp greater or equal to 38C (100.4F), Mild Pain (1 - 3), Moderate Pain (4 - 6) (18 Dec 2023 13:30)  aluminum hydroxide-magnesium hydroxide 200 mg-200 mg/5 mL oral suspension: 30 milliliter(s) orally every 4 hours As needed Dyspepsia (25 Oct 2023 15:04)  ammonium lactate 12% topical lotion: 1 Apply topically to affected area every 12 hours As needed BL feet dryness (18 Dec 2023 13:30)  ascorbic acid 500 mg oral tablet: 1 tab(s) orally once a day (25 Oct 2023 15:04)  bisacodyl 5 mg oral delayed release tablet: 1 tab(s) orally once a day As needed Constipation (18 Dec 2023 13:30)  folic acid 1 mg oral tablet: 1 tab(s) orally once a day (25 Oct 2023 15:04)  lactobacillus acidophilus oral capsule: 1 cap(s) orally 2 times a day (25 Oct 2023 15:04)  lidocaine 4% topical film: Apply topically to affected area once a day Apply patches to affected area from 8AM-8PM. Okay to substitute OTC Salonpas patches. (18 Dec 2023 13:30)  loperamide 2 mg oral capsule: 1 cap(s) orally 3 times a day As needed Diarrhea (25 Oct 2023 15:04)  melatonin 3 mg oral tablet: 2 tab(s) orally once a day (at bedtime) (25 Oct 2023 15:04)  menthol-benzocaine topical: 1 lozenge orally 2 times a day (18 Dec 2023 13:30)  Multiple Vitamins oral tablet: 1 tab(s) orally once a day (25 Oct 2023 15:04)  ocular lubricant ophthalmic solution: 1 drop(s) to each affected eye every 12 hours (25 Oct 2023 15:04)  polyethylene glycol 3350 oral powder for reconstitution: 17 gram(s) orally once a day (18 Dec 2023 13:30)  psyllium 3.4 g/7 g oral powder for reconstitution: 1 packet(s) orally once a day (25 Oct 2023 15:04)  senna leaf extract oral tablet: 2 tab(s) orally once a day (at bedtime) (25 Oct 2023 15:04)  sodium chloride 0.65% nasal spray: 1 spray(s) nasal 3 times a day (25 Oct 2023 15:04)  zinc oxide 40% topical ointment: Apply topically to affected area 2 times a day (25 Oct 2023 15:04)      REVIEW OF SYSTEMS:  Constitutional: [x ] negative [ ] fevers [ ] chills [ ] weight loss [ ] weight gain  HEENT: [ ] negative [x ] sore throat [ ] eye irritation [ ] postnasal drip [ ] nasal congestion  CV: [ x] negative  [ ] chest pain [ ] orthopnea [ ] palpitations [ ] murmur  Resp: [ ] negative [ x] cough [x ] shortness of breath [ ] dyspnea [ ] wheezing [ ] sputum [ ] hemoptysis  GI: [x ] negative [ ] nausea [ ] vomiting [ ] diarrhea [ ] constipation [ ] abd pain [ ] dysphagia   : [ ] negative [ ] dysuria [ ] nocturia [ ] hematuria [ ] increased urinary frequency  Musculoskeletal: [ ] negative [ ] back pain [ ] myalgias [ ] arthralgias [ ] fracture  Skin: [ ] negative [ ] rash [ ] itch  Neurological: [ ] negative [ ] headache [ ] dizziness [ ] syncope [ ] weakness [ ] numbness  Psychiatric: [ ] negative [ ] anxiety [ ] depression  Endocrine: [ ] negative [ ] diabetes [ ] thyroid problem  Hematologic/Lymphatic: [ ] negative [ ] anemia [ ] bleeding problem  Allergic/Immunologic: [ ] negative [ ] itchy eyes [ ] nasal discharge [ ] hives [ ] angioedema  [ x] All other systems negative  [ ] Unable to assess ROS because ________    OBJECTIVE:  ICU Vital Signs Last 24 Hrs  T(C): 37.2 (31 Dec 2023 16:31), Max: 37.4 (31 Dec 2023 10:59)  T(F): 98.9 (31 Dec 2023 16:31), Max: 99.3 (31 Dec 2023 10:59)  HR: 95 (31 Dec 2023 16:31) (88 - 108)  BP: 125/62 (31 Dec 2023 16:31) (117/73 - 156/112)  BP(mean): --  ABP: --  ABP(mean): --  RR: 28 (31 Dec 2023 16:31) (18 - 28)  SpO2: 94% (31 Dec 2023 16:31) (94% - 99%)    O2 Parameters below as of 31 Dec 2023 16:31  Patient On (Oxygen Delivery Method): room air              CAPILLARY BLOOD GLUCOSE          PHYSICAL EXAM:  Gen: NAD  HEENT: hoarse voice, coughed after drinking water during our evaluation. Upper airway high pitched expiratory sounds  Neck: No JVP elevation  CV: RRR, no m/r/g  Lung: bibasilar rales  Abd: Soft, NT, ND  Ext: lower ext edema  Skin: No rash  Neuro: A&Ox3, no focal deficits    HOSPITAL MEDICATIONS:  Standing Meds:  dexAMETHasone  IVPB 10 milliGRAM(s) IV Intermittent Once  diphenhydrAMINE Injectable 50 milliGRAM(s) IV Push once  famotidine Injectable 20 milliGRAM(s) IV Push once      PRN Meds:      LABS:    The Labs were reviewed by me   The Radiology was reviewed by me    EKG tracing reviewed by me    12-31    141  |  102  |  16  ----------------------------<  107<H>  3.5   |  30  |  0.58    Ca    9.7      31 Dec 2023 03:00  Phos  3.5     12-31  Mg     1.7     12-31    TPro  6.1  /  Alb  3.2<L>  /  TBili  1.0  /  DBili  x   /  AST  22  /  ALT  47<H>  /  AlkPhos  120  12-31    Magnesium: 1.7 mg/dL (12-31-23 @ 03:00)    Phosphorus: 3.5 mg/dL (12-31-23 @ 03:00)                    Urinalysis Basic - ( 31 Dec 2023 03:00 )    Color: x / Appearance: x / SG: x / pH: x  Gluc: 107 mg/dL / Ketone: x  / Bili: x / Urobili: x   Blood: x / Protein: x / Nitrite: x   Leuk Esterase: x / RBC: x / WBC x   Sq Epi: x / Non Sq Epi: x / Bacteria: x                              10.7   14.12 )-----------( 304      ( 31 Dec 2023 03:00 )             34.3     CAPILLARY BLOOD GLUCOSE            MICROBIOLOGY:       RADIOLOGY:  [ ] Reviewed and interpreted by me    PULMONARY FUNCTION TESTS:    EKG:

## 2023-12-31 NOTE — ED ADULT NURSE NOTE - CAS EDP DISCH TYPE
[facility]  Progress Note  Name: Guanaco Greene  MRN: [id number]  Unit/Bed#: S -01 I Date of Admission: 8/6/2023   Date of Service: 8/9/2023 I Hospital Day: 3    Assessment/Plan   Fall  Assessment & Plan  - Status post mechanical fall with the below noted injuries. - Fall precautions. - Geriatric Medicine consultation for evaluation, medication review and recommendations.  - PT and OT evaluation and treatment as indicated. - Case Management consultation for disposition planning. * Fracture of left femur ([address])  Assessment & Plan  - Left femoral fracture, present on admission.  - Appreciate Orthopedic surgery evaluation, recommendations and interventions as noted. - Status post ORIF of left peritrochanteric femur fracture with IM nail insertion on 8/7/2023.  - May be weightbearing as tolerated on the left lower extremity.  - Monitor left lower extremity neurovascular exam.  - Continue multimodal analgesic regimen.  - Continue DVT prophylaxis. - PT and OT evaluation and treatment as indicated. - Outpatient follow up with Orthopedic surgery for re-evaluation. Acute pain due to trauma  Assessment & Plan  - Acute pain secondary to traumatic injuries. - Appreciate APS evaluation and recommendations. - Bowel regimen as long as using opioids.  - Continue to monitor pain and adjust regimen as indicated. Acute blood loss anemia  Assessment & Plan  - Patient noted to have acute blood loss secondary to femur fracture and operative intervention this hospital encounter.   - No evidence of active or ongoing bleeding.  - Patient is status post transfusion of 1 unit packed red blood cells postoperatively. - Patient had appropriate response in hemoglobin. - Continue to monitor hemodynamic status.  - Outpatient follow-up with PCP recommended.     Pulmonary embolism [facility])  Assessment & Plan  - Patient with prior history of PE.   - Per patient's POA, she was diagnosed with acute PE within the last couple of months at [facility]. She has been on Eliquis and 4 L oxygen NC since that time. - Anticoagulation transitioned off of heparin infusion back to home Eliquis dosing on 8/9/2023.  - Continue supplemental oxygen as needed/appropriate. - Outpatient follow-up per routine. Hypertension  Assessment & Plan  - Patient with chronic history of hypertension.  - Continue current medication regimen and resume home medication regimen on discharge as appropriate. - Outpatient follow-up routine. Bowel Regimen: On MiraLAX. VTE Prophylaxis: Anticoagulated with Eliquis. Disposition: Continue current level of care. Anticipate discharge to postacute care facility for rehab. Case management following for disposition planning. Subjective   Chief Complaint: "My left hip is a little bit sore."    Subjective: Patient is overall doing well. She does complain of some left hip pain/soreness, but is overall doing well. She has no other new complaints and denies any new or worsening pain. She is tolerating her diet without nausea or vomiting. No overnight complaints. Objective   Vitals:   Temp:  [97.5 °F (36.4 °C)-97.9 °F (36.6 °C)] 97.9 °F (36.6 °C)  HR:  [46-65] 52  Resp:  [17] 17  BP: ()/(49-56) 112/56    I/O       08/07 0701  08/08 0700 08/08 0701  08/09 0700 08/09 0701  08/10 0700    I.V. (mL/kg) 500 (11.6)      Blood 490      IV Piggyback 50      Total Intake(mL/kg) 1040 (24.2)      Stool 0      Blood 10      Total Output 10      Net +1030             Unmeasured Stool Occurrence 1 x 1 x            Physical Exam:   GENERAL APPEARANCE: Patient in no acute distress. HEENT: NCAT; EOMs intact; Mucous membranes moist  CV: Regular rate and rhythm; no murmur/gallops/rubs appreciated. CHEST / LUNGS: Clear to auscultation; no wheezes/rales/rhonci. Nonlabored breathing with use of nasal cannula. ABD: NABS; soft; non-distended; non-tender. : Voiding spontaneously.   EXT: +2 pulses bilaterally upper & lower extremities. Mild tenderness over the left lateral hip and thigh with some swelling, but soft and compressible apartments with intact neurovascular exam distally. Postoperative dressing with old bloody drainage on it, but no evidence of active or ongoing bleeding. NEURO: GCS 15; no focal neurologic deficits; neurovascularly intact. SKIN: Warm, dry and well perfused; no rash; no jaundice. Invasive Devices     Peripheral Intravenous Line  Duration           Peripheral IV 08/07/23 Right;Ventral (anterior) Forearm 1 day    Peripheral IV 08/08/23 Left Antecubital 1 day                      Lab Results: Results: I have personally reviewed all pertinent laboratory/tests results  Imaging: I have personally reviewed pertinent reports.      Other Studies: N/A      Shabnam Hernandez PA-C  8/9/2023  09:16 AM Home

## 2023-12-31 NOTE — ED PROVIDER NOTE - ATTENDING CONTRIBUTION TO CARE
I performed a face-to-face evaluation of the patient and performed a history and physical examination. I agree with the history and physical examination. If this was a PA visit, I personally saw the patient with the PA and performed a substantive portion of the visit including all aspects of the medical decision making.    Jenniffer: ILD, worsening SOB. Had eval this AM at [place]; got CT, Lasix, neb. Left on her own accord to come here b/c her Pulm is here. Will have Pulm see her. Likely admit. I performed a face-to-face evaluation of the patient and performed a history and physical examination. I agree with the history and physical examination. If this was a PA visit, I personally saw the patient with the PA and performed a substantive portion of the visit including all aspects of the medical decision making.    Jenniffer: ILD, worsening SOB. Had eval this AM at [place]; got CT, Lasix, neb. Left on her own accord to come here b/c her Pulm is here. Will have Pulm see her. Likely admit.

## 2023-12-31 NOTE — ED PROVIDER NOTE - OBJECTIVE STATEMENT
62 y/o F with h/o intersitial lung disease, s/p ECMO, recently discharge from hospital presents to Ed c/o worsening SOB with clear sputum for past few hours. She was seen by home visiting doc and was tested for strep, covid and flu and all were neg. no fever. but c/o she can not lay down. 64 y/o F with h/o intersitial lung disease, s/p ECMO, recently discharge from hospital presents to Ed c/o worsening SOB with clear sputum for past few hours. She was seen by home visiting doc and was tested for strep, covid and flu and all were neg. no fever. but c/o she can not lay down.

## 2023-12-31 NOTE — ED PROVIDER NOTE - PHYSICAL EXAMINATION
General:     NAD, well-nourished, well-appearing  Head:     NC/AT, EOMI, oral mucosa moist  Neck:     supple  Lungs:     wheezing, coarse rale b/L   CVS:     S1S2, RRR, no m/g/r  Abd:     +BS, s/nt/nd, no organomegaly  Ext:    2+ radial and pedal pulses, no c/c/e  Neuro: grossly intact

## 2023-12-31 NOTE — ED PROVIDER NOTE - CARE PLAN
1 Principal Discharge DX:	SOB (shortness of breath)  Secondary Diagnosis:	Interstitial lung disease

## 2023-12-31 NOTE — ED ADULT NURSE NOTE - CHIEF COMPLAINT QUOTE
increased difficulty breathing x past 2 days, sore throat with difficulty swallowing since yesterday. cough x 2 days. denies fever. states " has choking on saliva upon swallowing"  from [place] [facility]- states was tested - neg,covid,rsv increased difficulty breathing x past 2 days, sore throat with difficulty swallowing since yesterday. cough x 2 days. denies fever. states " has choking on saliva upon swallowing"  from [place] [facility]- states was tested - neg,covid,rsv

## 2023-12-31 NOTE — ED ADULT NURSE NOTE - ED STAT RN HANDOFF DETAILS
Report handed off to SADIE Licea pt in [facility], placed for transport. Report handed off to SADIE Licea pt in [facility], placed for transport.

## 2023-12-31 NOTE — ED ADULT NURSE NOTE - NSFALLUNIVINTERV_ED_ALL_ED
Bed/Stretcher in lowest position, wheels locked, appropriate side rails in place/Call bell, personal items and telephone in reach/Instruct patient to call for assistance before getting out of bed/chair/stretcher/Non-slip footwear applied when patient is off stretcher/[place] to call system/Physically safe environment - no spills, clutter or unnecessary equipment/Purposeful proactive rounding/Room/bathroom lighting operational, light cord in reach Bed/Stretcher in lowest position, wheels locked, appropriate side rails in place/Call bell, personal items and telephone in reach/Instruct patient to call for assistance before getting out of bed/chair/stretcher/Non-slip footwear applied when patient is off stretcher/[place] to call system/Physically safe environment - no spills, clutter or unnecessary equipment/Purposeful proactive rounding/Room/bathroom lighting operational, light cord in reach

## 2023-12-31 NOTE — ED ADULT TRIAGE NOTE - CHIEF COMPLAINT QUOTE
increased difficulty breathing x past 2 days, sore throat with difficulty swallowing since yesterday. cough x 2 days. denies fever. states " has choking on saliva upon swallowing"  from [facility]- states was tested - neg,covid,rsv increased difficulty breathing x past 2 days, sore throat with difficulty swallowing since yesterday. cough x 2 days. denies fever. states " has choking on saliva upon swallowing"  from [place] [facility]- states was tested - neg,covid,rsv

## 2023-12-31 NOTE — ED PROVIDER NOTE - CLINICAL SUMMARY MEDICAL DECISION MAKING FREE TEXT BOX
Jenniffer: ILD, worsening SOB. Had eval this AM at [place]; got CT, Lasix, neb. Left on her own accord to come here b/c her Pulm is here. Will have Pulm see her. Likely admit. Jenniffer: ILD, worsening SOB. Had eval this AM at [place]; got CT, Lasix, neb. Left on her own accord to come here b/c her Pulm is here. Will have Pulm see her. Likely admit. Jenniffer: ILD, worsening SOB. Had yeyo this AM at [place]; got CT, Lasix, neb. Left on her own accord to come here b/c her Pulm is here. Will have Pulm see her. Likely admit.    Rakan Narvaez MD, PGY2  63-year-old female with past medical history of interstitial lung disease and recent acute hypoxic respiratory failure that required intubation and then VV ECMO presenting to emergency department with worsening shortness of breath, cough, difficulty tolerating secretions x 1 to 2 days.  Patient presented to [place] emergency department earlier today and was given IV Lasix, single DuoNeb with improvement of symptoms, offered admission however patient wanted to come to [facility] as this is where her doctors are.  Patient states she has been unable to take any of her medications today because she is having difficulty swallowing secondary to her secretions.  At [place] patient had labs showing white blood cell count of 14, normal BNP, normal electrolytes, negative COVID influenza and RSV testing.  CT of chest largely unchanged from prior with additional interstitial interlobular septal thickening in bilateral lower lobes that may represent a component of pulmonary congestion however no significant change from CT scan in early December.    In the emergency department patient is hemodynamically stable, saturating 96% on room air currently patient does have minor increased work of breathing, oral temperature of 99.3.  Rhonchorous lung sounds bilaterally.  Will discuss with pulmonary team here at [facility], consider ENT consult for scope.  Patient will likely require admission. Jenniffer: ILD, worsening SOB. Had yeyo this AM at [place]; got CT, Lasix, neb. Left on her own accord to come here b/c her Pulm is here. Will have Pulm see her. Likely admit.    Rakan Narvaez MD, PGY2  63-year-old female with past medical history of interstitial lung disease and recent acute hypoxic respiratory failure that required intubation and then VV ECMO presenting to emergency department with worsening shortness of breath, cough, difficulty tolerating secretions x 1 to 2 days.  Patient presented to [place] emergency department earlier today and was given IV Lasix, single DuoNeb with improvement of symptoms, offered admission however patient wanted to come to [facility] as this is where her doctors are.  Patient states she has been unable to take any of her medications today because she is having difficulty swallowing secondary to her secretions.  At [place] patient had labs showing white blood cell count of 14, normal BNP, normal electrolytes, negative COVID influenza and RSV testing.  CT of chest largely unchanged from prior with additional interstitial interlobular septal thickening in bilateral lower lobes that may represent a component of pulmonary congestion however no significant change from CT scan in early December.    In the emergency department patient is hemodynamically stable, saturating 96% on room air currently patient does have minor increased work of breathing, oral temperature of 99.3.  Rhonchorous lung sounds bilaterally.  Will discuss with pulmonary team here at [facility], consider ENT consult for scope.  Patient will likely require admission.

## 2023-12-31 NOTE — CONSULT NOTE ADULT - ASSESSMENT
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 S/p pulse steroids, PLEX (9/15, 9/18, 9/20) and Rituximab (9/16 & 10/3). Course c/b severe leukopenia s/p filgastrim, shock liver with slow to resolved hyperbilirubinemia, RIJ DVT, oropharyngeal dysphagia, and recurrent SVT. Repeat CT chest on 9/30 with markedly improved bilateral groundglass opacities with resolved lung consolidations. Had been at rehab at , recently discharged, receiving tapering dose of medrol presenting with dyspnea, cough, difficulty tolerating secretions x 1 to 2 days.    #Cough  #Dyspnea  Presented to [place] emergency department earlier today and was given IV Lasix, single DuoNeb with improvement of symptoms but still with cough, dysphagia, hoarse voice. CT of chest largely unchanged from prior 12/5. Saturating well on RA. Significant upper airway high pitched wheeze with sounds transmitted on lung auscultation.  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 with acute hypoxemic respiratory failure that initially responded to steroids, then with worsening after taper with development of acute hypoxemic and hypercapnic respiratory failure requiring intubation and VV- ECMO on 9/13, then transferred to [facility], concerning for DAH vs ILD flare, decannulated from VV-ECMO 9/21, extubated 9/22. S/p pulse steroids, PLEX (9/15, 9/18, 9/20) and Rituximab (9/16 & 10/3). Course c/b severe leukopenia s/p filgastrim, shock liver with slow to resolved hyperbilirubinemia, RIJ DVT, oropharyngeal dysphagia, and recurrent SVT. Repeat CT chest on 9/30 with markedly improved bilateral groundglass opacities with resolved lung consolidations. Had been at rehab at , recently discharged, receiving tapering dose of medrol presenting with dyspnea, cough, difficulty tolerating secretions x 1 to 2 days.    #Cough  #Dyspnea  Presented to [place] emergency department earlier today and was given IV Lasix, single DuoNeb with improvement of symptoms but still with cough, dysphagia, hoarse voice. CT of chest largely unchanged from prior 12/5. Saturating well on RA. Significant upper airway high pitched wheeze with sounds transmitted on lung auscultation.  - Agree with ENT eval  - steroids per ENT, resume steroid taper when complete  - consider SLP eval

## 2023-12-31 NOTE — ED ADULT TRIAGE NOTE - CHIEF COMPLAINT QUOTE
pt  from  home  main complaint  of sore and swollen throat   she  was d/c 10 days ago from   hosp  pt has ecmo hx

## 2023-12-31 NOTE — ED ADULT NURSE NOTE - OBJECTIVE STATEMENT
Received pt to [facility] from home with c/o difficulty swallowing x1-2 days. Pt was seen at [facility] earlier today, given Lasix and duoneb with partial relief. Pt was offered admission but wanted to come to [facility] because her pulmonologist is here. Pt denies difficulty breathing, chest pain, fever, cough or chills. Pt changed into gown and placed on cardiac monitor showing NSR. MD at bedside at bedside for eval. Received pt to [facility] from home with c/o difficulty swallowing x1-2 days. Pt was seen at [facility] earlier today, given Lasix and duoneb with partial relief. Pt was offered admission but wanted to come to [facility] because her pulmonologist is here. Pt denies difficulty breathing, chest pain, fever, cough or chills. Pt changed into gown and placed on cardiac monitor showing NSR. MD at bedside at bedside for eval.

## 2023-12-31 NOTE — CONSULT NOTE ADULT - SUBJECTIVE AND OBJECTIVE BOX
ENT Consult Note      Chief complaint: Difficulty swallowing    HPI:  Patient is a 63y Female w/ PMHx interstitial lung disease recently admitted for acute respiratory failure requiring ECMO and prolonged intubated a few months ago who presents w/ difficulty swallowing liquids since yesterday. It makes her cough and a lot of it comes back up. It has been getting better throughout the day. Has not tried solids. This has not happened before. Voice is hoarse. No breathing changes from baseline, patient saturating mid-high 90s on room air. Has never had a allergic reaction. Does not have HTN, not on ACE ARB. No new exposures, no skin changes or itchiness.    Allergies  No Known Allergies        Past Medical History:  Afib  Sciatica  Interstitial lung disease  Lymphedema  SOB (shortness of breath)    Medications:  diphenhydrAMINE Injectable 50 milliGRAM(s) IV Push once  famotidine Injectable 20 milliGRAM(s) IV Push once    Review of Systems:  All review of symptoms negative except for HPI including negative for Ears, Mouth/Throat, Cardiopulmonary, Genitourinary, Gastrointestinal, Psychological, Sleep pattern, Endocrine, Eyes, Neurologic, Musculoskeletal, Skin, Hematologic/Lymphatic and Allergic/Immunologic      T(C): 37.2 (12-31-23 @ 16:31), Max: 37.4 (12-31-23 @ 10:59)  HR: 95 (12-31-23 @ 16:31) (88 - 108)  BP: 125/62 (12-31-23 @ 16:31) (117/73 - 156/112)  RR: 28 (12-31-23 @ 16:31) (18 - 28)  SpO2: 94% (12-31-23 @ 16:31) (94% - 99%)        Physical Exam:  NAD, laying in bed. Awake, alert.  Breathing comfortably on room air. No stridor, stertor.  Face: symmetric without masses or lesions.  OU: PERRL, EOMI  NC: clear anteriorly. Mucosa normal without crusting, bleeding.  OC/OP: clear, wnl. No masses, lesions.  Neck: soft, flat, and supple. No palpable collection, induration, or crepitus noted.  CNII-XII intact    Procedure: Flexible laryngoscopy    Description:    A flexible endoscope was used to examine the left nasal cavity, posterior oropharynx, hypopharynx, and larynx. The nasal valve areas were examined for abnormalities or collapse. The inferior and middle turbinates were evaluated. The middle and superior meatuses, the sphenoethmoid recesses, and the nasopharynx were examined and inspected for mucopurulence, polyps, and nasal masses. The oropharynx, tongue base/vallecula, epiglottis, aryepiglottic folds, arytenoids, vocal cords, hypopharynx were also inspected for swelling, inflammation, or dysfunction. The patient tolerated the procedure without complications.    Findings:  Left nasal cavity patent, no inferior turb hypertrophy, no masses/lesions, no discharge  NP wnl  BOT/vallecula normal  Epiglottis sharp  Arytenoids edematous, difficulty to assess mobility  Vocal cords mobile, full motion difficult to assess  No masses or lesions visualized in post cricoid space or pyriform sinuses bilaterally  Airway patent    Assessment and Plan:  Patient is a 63y Female w/ PMHx interstitial lung disease presents w/ likely angioedema given arytenoid edema on scope exam. No change in baseline respiratory status, saturating well on room air. Airway patent at this time    -angioedema cocktail:  	-dexamethasone 0.25 mg/kg on arrival, then 0.125 mg/kg q6hr x2 (for total of 0.5 mg/kg in first 24 hrs)  	-diphenhydramine 50 mg IV q12hr  	-famotidine 30 mg IV q12hr  -continuous pulse ox  -allergy referral upon discharge  -will reassess in ~6 hours to monitor for resolution/improvement of symptoms  -please page ENT with any signs of respiratory distress, oxygen desaturations  -will discuss with attending and update team with any new recommendations  -Rest of care per primary team      Lupillo Saba MD  ENT

## 2024-01-01 ENCOUNTER — TRANSCRIPTION ENCOUNTER (OUTPATIENT)
Age: 64
End: 2024-01-01

## 2024-01-01 DIAGNOSIS — Z29.9 ENCOUNTER FOR PROPHYLACTIC MEASURES, UNSPECIFIED: ICD-10-CM

## 2024-01-01 DIAGNOSIS — D72.829 ELEVATED WHITE BLOOD CELL COUNT, UNSPECIFIED: ICD-10-CM

## 2024-01-01 DIAGNOSIS — I48.0 PAROXYSMAL ATRIAL FIBRILLATION: ICD-10-CM

## 2024-01-01 DIAGNOSIS — M54.30 SCIATICA, UNSPECIFIED SIDE: ICD-10-CM

## 2024-01-01 DIAGNOSIS — J84.9 INTERSTITIAL PULMONARY DISEASE, UNSPECIFIED: ICD-10-CM

## 2024-01-01 DIAGNOSIS — D64.9 ANEMIA, UNSPECIFIED: ICD-10-CM

## 2024-01-01 DIAGNOSIS — T78.3XXA ANGIONEUROTIC EDEMA, INITIAL ENCOUNTER: ICD-10-CM

## 2024-01-01 LAB
B PERT DNA SPEC QL NAA+PROBE: SIGNIFICANT CHANGE UP
B PERT DNA SPEC QL NAA+PROBE: SIGNIFICANT CHANGE UP
B PERT+PARAPERT DNA PNL SPEC NAA+PROBE: SIGNIFICANT CHANGE UP
B PERT+PARAPERT DNA PNL SPEC NAA+PROBE: SIGNIFICANT CHANGE UP
BORDETELLA PARAPERTUSSIS (RAPRVP): SIGNIFICANT CHANGE UP
BORDETELLA PARAPERTUSSIS (RAPRVP): SIGNIFICANT CHANGE UP
C PNEUM DNA SPEC QL NAA+PROBE: SIGNIFICANT CHANGE UP
C PNEUM DNA SPEC QL NAA+PROBE: SIGNIFICANT CHANGE UP
CRP SERPL-MCNC: 98.3 MG/L — HIGH
CRP SERPL-MCNC: 98.3 MG/L — HIGH
ERYTHROCYTE [SEDIMENTATION RATE] IN BLOOD: 27 MM/HR — HIGH (ref 4–25)
ERYTHROCYTE [SEDIMENTATION RATE] IN BLOOD: 27 MM/HR — HIGH (ref 4–25)
FERRITIN SERPL-MCNC: 60 NG/ML — SIGNIFICANT CHANGE UP (ref 13–330)
FERRITIN SERPL-MCNC: 60 NG/ML — SIGNIFICANT CHANGE UP (ref 13–330)
FLUAV SUBTYP SPEC NAA+PROBE: SIGNIFICANT CHANGE UP
FLUAV SUBTYP SPEC NAA+PROBE: SIGNIFICANT CHANGE UP
FLUBV RNA SPEC QL NAA+PROBE: SIGNIFICANT CHANGE UP
FLUBV RNA SPEC QL NAA+PROBE: SIGNIFICANT CHANGE UP
FOLATE SERPL-MCNC: 20 NG/ML — HIGH (ref 3.1–17.5)
FOLATE SERPL-MCNC: 20 NG/ML — HIGH (ref 3.1–17.5)
HADV DNA SPEC QL NAA+PROBE: SIGNIFICANT CHANGE UP
HADV DNA SPEC QL NAA+PROBE: SIGNIFICANT CHANGE UP
HCOV 229E RNA SPEC QL NAA+PROBE: SIGNIFICANT CHANGE UP
HCOV 229E RNA SPEC QL NAA+PROBE: SIGNIFICANT CHANGE UP
HCOV HKU1 RNA SPEC QL NAA+PROBE: SIGNIFICANT CHANGE UP
HCOV HKU1 RNA SPEC QL NAA+PROBE: SIGNIFICANT CHANGE UP
HCOV NL63 RNA SPEC QL NAA+PROBE: SIGNIFICANT CHANGE UP
HCOV NL63 RNA SPEC QL NAA+PROBE: SIGNIFICANT CHANGE UP
HCOV OC43 RNA SPEC QL NAA+PROBE: SIGNIFICANT CHANGE UP
HCOV OC43 RNA SPEC QL NAA+PROBE: SIGNIFICANT CHANGE UP
HCT VFR BLD CALC: 33.6 % — LOW (ref 34.5–45)
HCT VFR BLD CALC: 33.6 % — LOW (ref 34.5–45)
HGB BLD-MCNC: 10.2 G/DL — LOW (ref 11.5–15.5)
HGB BLD-MCNC: 10.2 G/DL — LOW (ref 11.5–15.5)
HMPV RNA SPEC QL NAA+PROBE: SIGNIFICANT CHANGE UP
HMPV RNA SPEC QL NAA+PROBE: SIGNIFICANT CHANGE UP
HPIV1 RNA SPEC QL NAA+PROBE: SIGNIFICANT CHANGE UP
HPIV1 RNA SPEC QL NAA+PROBE: SIGNIFICANT CHANGE UP
HPIV2 RNA SPEC QL NAA+PROBE: SIGNIFICANT CHANGE UP
HPIV2 RNA SPEC QL NAA+PROBE: SIGNIFICANT CHANGE UP
HPIV3 RNA SPEC QL NAA+PROBE: SIGNIFICANT CHANGE UP
HPIV3 RNA SPEC QL NAA+PROBE: SIGNIFICANT CHANGE UP
HPIV4 RNA SPEC QL NAA+PROBE: SIGNIFICANT CHANGE UP
HPIV4 RNA SPEC QL NAA+PROBE: SIGNIFICANT CHANGE UP
IRON SATN MFR SERPL: 27 UG/DL — LOW (ref 30–160)
IRON SATN MFR SERPL: 27 UG/DL — LOW (ref 30–160)
IRON SATN MFR SERPL: 8 % — LOW (ref 14–50)
IRON SATN MFR SERPL: 8 % — LOW (ref 14–50)
M PNEUMO DNA SPEC QL NAA+PROBE: SIGNIFICANT CHANGE UP
M PNEUMO DNA SPEC QL NAA+PROBE: SIGNIFICANT CHANGE UP
MCHC RBC-ENTMCNC: 24.4 PG — LOW (ref 27–34)
MCHC RBC-ENTMCNC: 24.4 PG — LOW (ref 27–34)
MCHC RBC-ENTMCNC: 30.4 GM/DL — LOW (ref 32–36)
MCHC RBC-ENTMCNC: 30.4 GM/DL — LOW (ref 32–36)
MCV RBC AUTO: 80.4 FL — SIGNIFICANT CHANGE UP (ref 80–100)
MCV RBC AUTO: 80.4 FL — SIGNIFICANT CHANGE UP (ref 80–100)
NRBC # BLD: 0 /100 WBCS — SIGNIFICANT CHANGE UP (ref 0–0)
NRBC # BLD: 0 /100 WBCS — SIGNIFICANT CHANGE UP (ref 0–0)
NRBC # FLD: 0 K/UL — SIGNIFICANT CHANGE UP (ref 0–0)
NRBC # FLD: 0 K/UL — SIGNIFICANT CHANGE UP (ref 0–0)
PLATELET # BLD AUTO: 298 K/UL — SIGNIFICANT CHANGE UP (ref 150–400)
PLATELET # BLD AUTO: 298 K/UL — SIGNIFICANT CHANGE UP (ref 150–400)
PROCALCITONIN SERPL-MCNC: 0.07 NG/ML — SIGNIFICANT CHANGE UP (ref 0.02–0.1)
PROCALCITONIN SERPL-MCNC: 0.07 NG/ML — SIGNIFICANT CHANGE UP (ref 0.02–0.1)
RAPID RVP RESULT: SIGNIFICANT CHANGE UP
RAPID RVP RESULT: SIGNIFICANT CHANGE UP
RBC # BLD: 4.18 M/UL — SIGNIFICANT CHANGE UP (ref 3.8–5.2)
RBC # BLD: 4.18 M/UL — SIGNIFICANT CHANGE UP (ref 3.8–5.2)
RBC # FLD: 16.4 % — HIGH (ref 10.3–14.5)
RBC # FLD: 16.4 % — HIGH (ref 10.3–14.5)
RSV RNA SPEC QL NAA+PROBE: SIGNIFICANT CHANGE UP
RSV RNA SPEC QL NAA+PROBE: SIGNIFICANT CHANGE UP
RV+EV RNA SPEC QL NAA+PROBE: SIGNIFICANT CHANGE UP
RV+EV RNA SPEC QL NAA+PROBE: SIGNIFICANT CHANGE UP
S PYO DNA THROAT QL NAA+PROBE: SIGNIFICANT CHANGE UP
S PYO DNA THROAT QL NAA+PROBE: SIGNIFICANT CHANGE UP
SARS-COV-2 RNA SPEC QL NAA+PROBE: SIGNIFICANT CHANGE UP
SARS-COV-2 RNA SPEC QL NAA+PROBE: SIGNIFICANT CHANGE UP
TIBC SERPL-MCNC: 345 UG/DL — SIGNIFICANT CHANGE UP (ref 220–430)
TIBC SERPL-MCNC: 345 UG/DL — SIGNIFICANT CHANGE UP (ref 220–430)
TSH SERPL-MCNC: 0.73 UIU/ML — SIGNIFICANT CHANGE UP (ref 0.27–4.2)
TSH SERPL-MCNC: 0.73 UIU/ML — SIGNIFICANT CHANGE UP (ref 0.27–4.2)
UIBC SERPL-MCNC: 318 UG/DL — SIGNIFICANT CHANGE UP (ref 110–370)
UIBC SERPL-MCNC: 318 UG/DL — SIGNIFICANT CHANGE UP (ref 110–370)
VIT B12 SERPL-MCNC: 732 PG/ML — SIGNIFICANT CHANGE UP (ref 200–900)
VIT B12 SERPL-MCNC: 732 PG/ML — SIGNIFICANT CHANGE UP (ref 200–900)
WBC # BLD: 9.23 K/UL — SIGNIFICANT CHANGE UP (ref 3.8–10.5)
WBC # BLD: 9.23 K/UL — SIGNIFICANT CHANGE UP (ref 3.8–10.5)
WBC # FLD AUTO: 9.23 K/UL — SIGNIFICANT CHANGE UP (ref 3.8–10.5)
WBC # FLD AUTO: 9.23 K/UL — SIGNIFICANT CHANGE UP (ref 3.8–10.5)

## 2024-01-01 PROCEDURE — 99223 1ST HOSP IP/OBS HIGH 75: CPT | Mod: GC

## 2024-01-01 RX ORDER — ZINC OXIDE 200 MG/G
1 OINTMENT TOPICAL
Refills: 0 | Status: DISCONTINUED | OUTPATIENT
Start: 2024-01-01 | End: 2024-01-02

## 2024-01-01 RX ORDER — GABAPENTIN 400 MG/1
300 CAPSULE ORAL THREE TIMES A DAY
Refills: 0 | Status: DISCONTINUED | OUTPATIENT
Start: 2024-01-01 | End: 2024-01-02

## 2024-01-01 RX ORDER — PANTOPRAZOLE SODIUM 20 MG/1
40 TABLET, DELAYED RELEASE ORAL DAILY
Refills: 0 | Status: DISCONTINUED | OUTPATIENT
Start: 2024-01-01 | End: 2024-01-02

## 2024-01-01 RX ORDER — POLYETHYLENE GLYCOL 3350 17 G/17G
17 POWDER, FOR SOLUTION ORAL DAILY
Refills: 0 | Status: DISCONTINUED | OUTPATIENT
Start: 2024-01-01 | End: 2024-01-02

## 2024-01-01 RX ORDER — ATOVAQUONE 750 MG/5ML
1500 SUSPENSION ORAL DAILY
Refills: 0 | Status: DISCONTINUED | OUTPATIENT
Start: 2024-01-01 | End: 2024-01-01

## 2024-01-01 RX ORDER — ATOVAQUONE 750 MG/5ML
1500 SUSPENSION ORAL DAILY
Refills: 0 | Status: DISCONTINUED | OUTPATIENT
Start: 2024-01-01 | End: 2024-01-02

## 2024-01-01 RX ORDER — DEXAMETHASONE 0.5 MG/5ML
10 ELIXIR ORAL THREE TIMES A DAY
Refills: 0 | Status: COMPLETED | OUTPATIENT
Start: 2024-01-01 | End: 2024-01-01

## 2024-01-01 RX ORDER — METOPROLOL TARTRATE 50 MG
12.5 TABLET ORAL EVERY 12 HOURS
Refills: 0 | Status: DISCONTINUED | OUTPATIENT
Start: 2024-01-01 | End: 2024-01-02

## 2024-01-01 RX ORDER — NYSTATIN CREAM 100000 [USP'U]/G
1 CREAM TOPICAL
Refills: 0 | Status: DISCONTINUED | OUTPATIENT
Start: 2024-01-01 | End: 2024-01-02

## 2024-01-01 RX ORDER — FAMOTIDINE 10 MG/ML
20 INJECTION INTRAVENOUS EVERY 12 HOURS
Refills: 0 | Status: DISCONTINUED | OUTPATIENT
Start: 2024-01-01 | End: 2024-01-03

## 2024-01-01 RX ORDER — IPRATROPIUM BROMIDE 0.2 MG/ML
2 SOLUTION, NON-ORAL INHALATION
Refills: 0 | Status: DISCONTINUED | OUTPATIENT
Start: 2024-01-01 | End: 2024-01-02

## 2024-01-01 RX ORDER — ALPRAZOLAM 0.25 MG
0.25 TABLET ORAL EVERY 6 HOURS
Refills: 0 | Status: DISCONTINUED | OUTPATIENT
Start: 2024-01-01 | End: 2024-01-02

## 2024-01-01 RX ORDER — FAMOTIDINE 10 MG/ML
30 INJECTION INTRAVENOUS EVERY 12 HOURS
Refills: 0 | Status: DISCONTINUED | OUTPATIENT
Start: 2024-01-01 | End: 2024-01-01

## 2024-01-01 RX ORDER — ENOXAPARIN SODIUM 100 MG/ML
120 INJECTION SUBCUTANEOUS EVERY 12 HOURS
Refills: 0 | Status: DISCONTINUED | OUTPATIENT
Start: 2024-01-01 | End: 2024-01-02

## 2024-01-01 RX ORDER — SENNA PLUS 8.6 MG/1
2 TABLET ORAL AT BEDTIME
Refills: 0 | Status: DISCONTINUED | OUTPATIENT
Start: 2024-01-01 | End: 2024-01-02

## 2024-01-01 RX ORDER — BACLOFEN 100 %
5 POWDER (GRAM) MISCELLANEOUS EVERY 8 HOURS
Refills: 0 | Status: DISCONTINUED | OUTPATIENT
Start: 2024-01-01 | End: 2024-01-02

## 2024-01-01 RX ORDER — LIDOCAINE 4 G/100G
1 CREAM TOPICAL DAILY
Refills: 0 | Status: DISCONTINUED | OUTPATIENT
Start: 2024-01-01 | End: 2024-01-02

## 2024-01-01 RX ORDER — DIPHENHYDRAMINE HCL 50 MG
50 CAPSULE ORAL EVERY 12 HOURS
Refills: 0 | Status: DISCONTINUED | OUTPATIENT
Start: 2024-01-01 | End: 2024-01-03

## 2024-01-01 RX ORDER — GABAPENTIN 400 MG/1
300 CAPSULE ORAL THREE TIMES A DAY
Refills: 0 | Status: DISCONTINUED | OUTPATIENT
Start: 2024-01-01 | End: 2024-01-01

## 2024-01-01 RX ORDER — CELECOXIB 200 MG/1
100 CAPSULE ORAL
Refills: 0 | Status: DISCONTINUED | OUTPATIENT
Start: 2024-01-01 | End: 2024-01-02

## 2024-01-01 RX ORDER — SOD,AMMONIUM,POTASSIUM LACTATE
1 CREAM (GRAM) TOPICAL EVERY 12 HOURS
Refills: 0 | Status: DISCONTINUED | OUTPATIENT
Start: 2024-01-01 | End: 2024-01-02

## 2024-01-01 RX ORDER — PSYLLIUM SEED (WITH DEXTROSE)
1 POWDER (GRAM) ORAL DAILY
Refills: 0 | Status: DISCONTINUED | OUTPATIENT
Start: 2024-01-01 | End: 2024-01-02

## 2024-01-01 RX ADMIN — SENNA PLUS 2 TABLET(S): 8.6 TABLET ORAL at 21:30

## 2024-01-01 RX ADMIN — Medication 102 MILLIGRAM(S): at 21:29

## 2024-01-01 RX ADMIN — Medication 50 MILLIGRAM(S): at 09:35

## 2024-01-01 RX ADMIN — Medication 50 MILLIGRAM(S): at 21:30

## 2024-01-01 RX ADMIN — Medication 102 MILLIGRAM(S): at 07:25

## 2024-01-01 RX ADMIN — ENOXAPARIN SODIUM 120 MILLIGRAM(S): 100 INJECTION SUBCUTANEOUS at 07:26

## 2024-01-01 RX ADMIN — ZINC OXIDE 1 APPLICATION(S): 200 OINTMENT TOPICAL at 18:18

## 2024-01-01 RX ADMIN — GABAPENTIN 300 MILLIGRAM(S): 400 CAPSULE ORAL at 21:30

## 2024-01-01 RX ADMIN — PANTOPRAZOLE SODIUM 40 MILLIGRAM(S): 20 TABLET, DELAYED RELEASE ORAL at 13:11

## 2024-01-01 RX ADMIN — Medication 5 MILLIGRAM(S): at 21:30

## 2024-01-01 RX ADMIN — FAMOTIDINE 20 MILLIGRAM(S): 10 INJECTION INTRAVENOUS at 18:19

## 2024-01-01 RX ADMIN — NYSTATIN CREAM 1 APPLICATION(S): 100000 CREAM TOPICAL at 07:12

## 2024-01-01 RX ADMIN — Medication 5 MILLIGRAM(S): at 15:13

## 2024-01-01 RX ADMIN — ZINC OXIDE 1 APPLICATION(S): 200 OINTMENT TOPICAL at 07:12

## 2024-01-01 RX ADMIN — Medication 102 MILLIGRAM(S): at 15:13

## 2024-01-01 RX ADMIN — GABAPENTIN 300 MILLIGRAM(S): 400 CAPSULE ORAL at 15:13

## 2024-01-01 RX ADMIN — ENOXAPARIN SODIUM 120 MILLIGRAM(S): 100 INJECTION SUBCUTANEOUS at 18:20

## 2024-01-01 RX ADMIN — Medication 2 PUFF(S): at 18:18

## 2024-01-01 RX ADMIN — Medication 12.5 MILLIGRAM(S): at 18:18

## 2024-01-01 RX ADMIN — Medication 2 PUFF(S): at 07:25

## 2024-01-01 RX ADMIN — ATOVAQUONE 1500 MILLIGRAM(S): 750 SUSPENSION ORAL at 21:30

## 2024-01-01 NOTE — ED ADULT NURSE REASSESSMENT NOTE - NS ED NURSE REASSESS COMMENT FT1
Break RN: pt a&ox4, denying chest pain, sob, n+v, headache, dizziness, difficulty swallowing. Airway patent, speaking in full sentences. breathing even, unlabored. Safety maintained.
Pt in NAD, VSS, will continue to monitor.
Pt requesting diet order, MD [place] text paged, awaiting return response. Pt in NAD, will continue to monitor.
Report received from day shift RN. Patient resting comfortably, medicated per order. 20G US guided IV placed to right forearm. Safety maintained, call bell within reach.
speech and swallow consult evaulated pt, pt ok to eat, in nad, will continue to monitor.
Pt reports partial relief of pain after Ofirmev. Pt pending ENT and pulm consult. Pt updated on plan of care, verbalized understanding. Will continue to monitor.

## 2024-01-01 NOTE — SWALLOW BEDSIDE ASSESSMENT ADULT - SWALLOW EVAL: DIAGNOSIS
Mild oropharyngeal dysphagia characterized by adequate bolus containment, preparation, and transport. Mild oral residue on left lateral blade following trial of regular solids. Drop in O2 saturation to 92% noted following trial of sequential straw sips of thin liquids. Pt noted to maintain O2 saturation with trials of thin liquids via isolated cup and straw sips. No overt s/s aspiration/penetration. No complaints of globus. Pt endorses mild discomfort in throat on left which she reports is significantly improved from previous day.

## 2024-01-01 NOTE — PATIENT PROFILE ADULT - FUNCTIONAL ASSESSMENT - BASIC MOBILITY 6.
3-calculated by average/Not able to assess (calculate score using [facility] averaging method)  3-calculated by average/Not able to assess (calculate score using [facility] averaging method)

## 2024-01-01 NOTE — DISCHARGE NOTE PROVIDER - NSDCMRMEDTOKEN_GEN_ALL_CORE_FT
ALPRAZolam 0.25 mg oral tablet: 1 tab(s) orally every 6 hours as needed for Anxiety MDD: 4  ammonium lactate 12% topical lotion: 1 Apply topically to affected area every 12 hours As needed BL feet dryness  apixaban 5 mg oral tablet: 1 tab(s) orally every 12 hours  ascorbic acid 500 mg oral tablet: 1 tab(s) orally once a day  atovaquone 750 mg/5 mL oral suspension: 10 milliliter(s) orally once a day  Atrovent HFA 17 mcg/inh inhalation aerosol: 2 puff(s) by metered dose inhaler 2 times a day  baclofen 5 mg oral tablet: 1 tab(s) orally every 8 hours MDD: 3  benzonatate 100 mg oral capsule: 1 cap(s) orally 3 times a day as needed for Cough  bisacodyl 5 mg oral delayed release tablet: 1 tab(s) orally once a day As needed Constipation  CeleBREX 100 mg oral capsule: 1 cap(s) orally 2 times a day  folic acid 1 mg oral tablet: 1 tab(s) orally once a day  gabapentin 300 mg oral capsule: 1 cap(s) orally 3 times a day  hydrocortisone 25 mg rectal suppository: 1 suppository(ies) rectal 2 times a day as needed for Hemorrhoids  lactobacillus acidophilus oral capsule: 1 cap(s) orally 2 times a day  lidocaine 4% topical film: Apply topically to affected area once a day Apply patches to affected area from 8AM-8PM. Okay to substitute OTC Salonpas patches.  loperamide 2 mg oral capsule: 1 cap(s) orally 3 times a day As needed Diarrhea  melatonin 3 mg oral tablet: 2 tab(s) orally once a day (at bedtime)  menthol-benzocaine topical: 1 lozenge orally 2 times a day  methylPREDNISolone 4 mg oral tablet: 7 tab(s) orally once a day Please take 7 tablets once a day until 12/28  Please take 5 tablets once a day from 12/29-1/12  Please take 4 tablets once a day from 1/13-1/20  Please take 3 tablets once a day from 1/21-1/27  Please take 2 tablets once a day from 1/28-2/3  Please take 1 tablet once a day from 2/4-2/10  metoprolol tartrate 25 mg oral tablet: 0.5 tab(s) orally 2 times a day  Multiple Vitamins oral tablet: 1 tab(s) orally once a day  nystatin 100,000 units/g topical powder: Apply topically to affected area 2 times a day 1 Apply topically to affected area 2 times a day  ocular lubricant ophthalmic solution: 1 drop(s) to each affected eye every 12 hours  pantoprazole 40 mg oral delayed release tablet: 1 tab(s) orally once a day (before a meal)  polyethylene glycol 3350 oral powder for reconstitution: 17 gram(s) orally once a day  psyllium 3.4 g/7 g oral powder for reconstitution: 1 packet(s) orally once a day  senna leaf extract oral tablet: 2 tab(s) orally once a day (at bedtime)  sodium chloride 0.65% nasal spray: 1 spray(s) nasal 3 times a day  zinc oxide 40% topical ointment: Apply topically to affected area 2 times a day   ALPRAZolam 0.25 mg oral tablet: 1 tab(s) orally every 6 hours as needed for Anxiety MDD: 4  ammonium lactate 12% topical lotion: 1 Apply topically to affected area every 12 hours As needed BL feet dryness  apixaban 5 mg oral tablet: 1 tab(s) orally every 12 hours  ascorbic acid 500 mg oral tablet: 1 tab(s) orally once a day  atovaquone 750 mg/5 mL oral suspension: 10 milliliter(s) orally once a day  Atrovent HFA 17 mcg/inh inhalation aerosol: 2 puff(s) by metered dose inhaler 2 times a day  baclofen 5 mg oral tablet: 1 tab(s) orally every 8 hours MDD: 3  benzonatate 100 mg oral capsule: 1 cap(s) orally 3 times a day as needed for Cough  bisacodyl 5 mg oral delayed release tablet: 1 tab(s) orally once a day As needed Constipation  CeleBREX 100 mg oral capsule: 1 cap(s) orally 2 times a day  folic acid 1 mg oral tablet: 1 tab(s) orally once a day  gabapentin 300 mg oral capsule: 1 cap(s) orally 3 times a day  hydrocortisone 25 mg rectal suppository: 1 suppository(ies) rectal 2 times a day as needed for Hemorrhoids  lactobacillus acidophilus oral capsule: 1 cap(s) orally 2 times a day  lidocaine 4% topical film: Apply topically to affected area once a day Apply patches to affected area from 8AM-8PM. Okay to substitute OTC Salonpas patches.  loperamide 2 mg oral capsule: 1 cap(s) orally 3 times a day As needed Diarrhea  melatonin 3 mg oral tablet: 2 tab(s) orally once a day (at bedtime)  menthol-benzocaine topical: 1 lozenge orally 2 times a day  methylPREDNISolone 4 mg oral tablet: 7 tab(s) orally once a day Please take 7 tablets once a day until 12/28  Please take 5 tablets once a day from 12/29-1/12  Please take 4 tablets once a day from 1/13-1/20  Please take 3 tablets once a day from 1/21-1/27  Please take 2 tablets once a day from 1/28-2/3  Please take 1 tablet once a day from 2/4-2/10  metoprolol tartrate 25 mg oral tablet: 0.5 tab(s) orally 2 times a day  Multiple Vitamins oral tablet: 1 tab(s) orally once a day  nystatin 100,000 units/g topical powder: Apply topically to affected area 2 times a day 1 Apply topically to affected area 2 times a day  ocular lubricant ophthalmic solution: 1 drop(s) to each affected eye every 12 hours  pantoprazole 40 mg oral delayed release tablet: 1 tab(s) orally once a day (before a meal)  senna leaf extract oral tablet: 2 tab(s) orally once a day (at bedtime)  sodium chloride 0.65% nasal spray: 1 spray(s) nasal 3 times a day  zinc oxide 40% topical ointment: Apply topically to affected area 2 times a day   ALPRAZolam 0.25 mg oral tablet: 1 tab(s) orally every 6 hours as needed for Anxiety MDD: 4  ammonium lactate 12% topical lotion: 1 Apply topically to affected area every 12 hours As needed BL feet dryness  apixaban 5 mg oral tablet: 1 tab(s) orally every 12 hours  ascorbic acid 500 mg oral tablet: 1 tab(s) orally once a day  atovaquone 750 mg/5 mL oral suspension: 10 milliliter(s) orally once a day  Atrovent HFA 17 mcg/inh inhalation aerosol: 2 puff(s) by metered dose inhaler 2 times a day  baclofen 5 mg oral tablet: 1 tab(s) orally every 8 hours MDD: 3  benzonatate 100 mg oral capsule: 1 cap(s) orally 3 times a day as needed for Cough  bisacodyl 5 mg oral delayed release tablet: 1 tab(s) orally once a day As needed Constipation  folic acid 1 mg oral tablet: 1 tab(s) orally once a day  gabapentin 300 mg oral capsule: 1 cap(s) orally 3 times a day  hydrocortisone 25 mg rectal suppository: 1 suppository(ies) rectal 2 times a day as needed for Hemorrhoids  lactobacillus acidophilus oral capsule: 1 cap(s) orally 2 times a day  lidocaine 4% topical film: Apply topically to affected area once a day Apply patches to affected area from 8AM-8PM. Okay to substitute OTC Salonpas patches.  loperamide 2 mg oral capsule: 1 cap(s) orally 3 times a day As needed Diarrhea  melatonin 3 mg oral tablet: 2 tab(s) orally once a day (at bedtime)  menthol-benzocaine topical: 1 lozenge orally 2 times a day  methylPREDNISolone 4 mg oral tablet: 7 tab(s) orally once a day Please take 7 tablets once a day until 12/28  Please take 5 tablets once a day from 12/29-1/12  Please take 4 tablets once a day from 1/13-1/20  Please take 3 tablets once a day from 1/21-1/27  Please take 2 tablets once a day from 1/28-2/3  Please take 1 tablet once a day from 2/4-2/10  metoprolol tartrate 25 mg oral tablet: 0.5 tab(s) orally 2 times a day  Multiple Vitamins oral tablet: 1 tab(s) orally once a day  nystatin 100,000 units/g topical powder: Apply topically to affected area 2 times a day 1 Apply topically to affected area 2 times a day  ocular lubricant ophthalmic solution: 1 drop(s) to each affected eye every 12 hours  pantoprazole 40 mg oral delayed release tablet: 1 tab(s) orally once a day (before a meal)  senna leaf extract oral tablet: 2 tab(s) orally once a day (at bedtime)  sodium chloride 0.65% nasal spray: 1 spray(s) nasal 3 times a day  zinc oxide 40% topical ointment: Apply topically to affected area 2 times a day   ALPRAZolam 0.25 mg oral tablet: 1 tab(s) orally every 6 hours as needed for Anxiety MDD: 4  ammonium lactate 12% topical lotion: 1 Apply topically to affected area every 12 hours As needed BL feet dryness  apixaban 5 mg oral tablet: 1 tab(s) orally every 12 hours  ascorbic acid 500 mg oral tablet: 1 tab(s) orally once a day  atovaquone 750 mg/5 mL oral suspension: 10 milliliter(s) orally once a day  Atrovent HFA 17 mcg/inh inhalation aerosol: 2 puff(s) by metered dose inhaler 2 times a day  baclofen 5 mg oral tablet: 1 tab(s) orally every 8 hours MDD: 3  benzonatate 100 mg oral capsule: 1 cap(s) orally 3 times a day as needed for Cough  bisacodyl 5 mg oral delayed release tablet: 1 tab(s) orally once a day As needed Constipation  EpiPen 2-Florentino 0.3 mg injectable kit: 0.3 milligram(s) intramuscularly once a day For severe allergic reaction  folic acid 1 mg oral tablet: 1 tab(s) orally once a day  gabapentin 300 mg oral capsule: 1 cap(s) orally 3 times a day  hydrocortisone 25 mg rectal suppository: 1 suppository(ies) rectal 2 times a day as needed for Hemorrhoids  lactobacillus acidophilus oral capsule: 1 cap(s) orally 2 times a day  lidocaine 4% topical film: Apply topically to affected area once a day Apply patches to affected area from 8AM-8PM. Okay to substitute OTC Salonpas patches.  loperamide 2 mg oral capsule: 1 cap(s) orally 3 times a day As needed Diarrhea  melatonin 3 mg oral tablet: 2 tab(s) orally once a day (at bedtime)  menthol-benzocaine topical: 1 lozenge orally 2 times a day  methylPREDNISolone 4 mg oral tablet: 8 tab(s) orally once a day methylprednisolone 32mg daily x4 days (1/5-1/8),  28mg daily x5 days (1/9-1/13),  24mg daily x5 days (1/14-1/18),  20mg daily x5 days (1/19-1/23),  16mg daily x5 days (1/24-1/28),  12mg daily x5 days (1/29-2/2),  8mg daily x5 days (2/3-2/7),  4mg daily x5 days (2/8-2/12)  metoprolol tartrate 25 mg oral tablet: 0.5 tab(s) orally 2 times a day  Multiple Vitamins oral tablet: 1 tab(s) orally once a day  nystatin 100,000 units/g topical powder: Apply topically to affected area 2 times a day 1 Apply topically to affected area 2 times a day  ocular lubricant ophthalmic solution: 1 drop(s) to each affected eye every 12 hours  pantoprazole 40 mg oral delayed release tablet: 1 tab(s) orally once a day (before a meal)  polyethylene glycol 3350 oral powder for reconstitution: 17 gram(s) orally once a day  psyllium 3.4 g/7 g oral powder for reconstitution: 1 packet(s) orally once a day  senna leaf extract oral tablet: 2 tab(s) orally once a day (at bedtime)  sodium chloride 0.65% nasal spray: 1 spray(s) nasal 3 times a day  traMADol 50 mg oral tablet: 0.5 tab(s) orally every 6 hours as needed for Severe Pain (7 - 10) MDD: 100mg  zinc oxide 40% topical ointment: Apply topically to affected area 2 times a day   ALPRAZolam 0.25 mg oral tablet: 1 tab(s) orally every 6 hours as needed for Anxiety MDD: 4  ammonium lactate 12% topical lotion: 1 Apply topically to affected area every 12 hours As needed BL feet dryness  apixaban 5 mg oral tablet: 1 tab(s) orally every 12 hours  ascorbic acid 500 mg oral tablet: 1 tab(s) orally once a day  atovaquone 750 mg/5 mL oral suspension: 10 milliliter(s) orally once a day  Atrovent HFA 17 mcg/inh inhalation aerosol: 2 puff(s) by metered dose inhaler 2 times a day  baclofen 5 mg oral tablet: 1 tab(s) orally every 8 hours MDD: 3  benzonatate 100 mg oral capsule: 1 cap(s) orally 3 times a day as needed for Cough  bisacodyl 5 mg oral delayed release tablet: 1 tab(s) orally once a day As needed Constipation  EpiPen 2-Florentino 0.3 mg injectable kit: 0.3 milligram(s) intramuscularly once a day For severe allergic reaction  folic acid 1 mg oral tablet: 1 tab(s) orally once a day  gabapentin 300 mg oral capsule: 1 cap(s) orally 3 times a day  Home Physical Therapy: Home physical therapy 3 days/week  hydrocortisone 25 mg rectal suppository: 1 suppository(ies) rectal 2 times a day as needed for Hemorrhoids  lactobacillus acidophilus oral capsule: 1 cap(s) orally 2 times a day  lidocaine 4% topical film: Apply topically to affected area once a day Apply patches to affected area from 8AM-8PM. Okay to substitute OTC Salonpas patches.  loperamide 2 mg oral capsule: 1 cap(s) orally 3 times a day As needed Diarrhea  melatonin 3 mg oral tablet: 2 tab(s) orally once a day (at bedtime)  menthol-benzocaine topical: 1 lozenge orally 2 times a day  methylPREDNISolone 4 mg oral tablet: 8 tab(s) orally once a day methylprednisolone 32mg daily x4 days (1/5-1/8),  28mg daily x5 days (1/9-1/13),  24mg daily x5 days (1/14-1/18),  20mg daily x5 days (1/19-1/23),  16mg daily x5 days (1/24-1/28),  12mg daily x5 days (1/29-2/2),  8mg daily x5 days (2/3-2/7),  4mg daily x5 days (2/8-2/12)  metoprolol tartrate 25 mg oral tablet: 0.5 tab(s) orally 2 times a day  Multiple Vitamins oral tablet: 1 tab(s) orally once a day  nystatin 100,000 units/g topical powder: Apply topically to affected area 2 times a day 1 Apply topically to affected area 2 times a day  ocular lubricant ophthalmic solution: 1 drop(s) to each affected eye every 12 hours  pantoprazole 40 mg oral delayed release tablet: 1 tab(s) orally once a day (before a meal)  polyethylene glycol 3350 oral powder for reconstitution: 17 gram(s) orally once a day  psyllium 3.4 g/7 g oral powder for reconstitution: 1 packet(s) orally once a day  senna leaf extract oral tablet: 2 tab(s) orally once a day (at bedtime)  sodium chloride 0.65% nasal spray: 1 spray(s) nasal 3 times a day  traMADol 50 mg oral tablet: 0.5 tab(s) orally every 6 hours as needed for Severe Pain (7 - 10) MDD: 100mg  zinc oxide 40% topical ointment: Apply topically to affected area 2 times a day

## 2024-01-01 NOTE — H&P ADULT - PROBLEM SELECTOR PLAN 4
10.7 -  stable from prior presentation. Mildly microcytic  - CTM CBC  - B12, iron, folate 10.7 -  stable from prior presentation. Mildly microcytic  - CTM CBC  - check B12, iron/ferritin, folate, TSH

## 2024-01-01 NOTE — H&P ADULT - PROBLEM SELECTOR PLAN 6
Says she does not require pain medication at this time  - Lidocaine PRN ordered  - Can consider toradol or ofirmev if needed for pain relief Says she does not require pain medication at this time  - Lidocaine PRN ordered  - Can consider toradol or ofirmev if needed for pain relief - patient on baclofen and gabapentin at home but cannot tolerate PO

## 2024-01-01 NOTE — SWALLOW BEDSIDE ASSESSMENT ADULT - COMMENTS
Per H&P: 62yo woman with "PMH of pAfib, sciatica, anxiety, ILD, admission 9/13-10/5 (transfer from [facility] to [facility]) for SOB, during which she required ECMO, plasmapheresis/rituximab, high-dose steroids, ccb platelet transfusions, dysphagia, RIJ DVT, discharged on steroid taper, recently discharged 12/20 from [facility], who presents with worsening shortness of breath, cough and difficulty tolerating secretions x1-2 days. She initially wento [facility] where she received IV lasix and a dose of DuoNeb with improvement of symptoms, preferred to come to [facility] for admission. RVP negative at [facility], leukocytosis of 14. CT chest unchanged. She was unable to take her steroids today 2/2 secretions. No fever. Cough noted, especially after she had some water. Was told she likely has JACEY, [facility] awaiting [facility] evaluation. Toileting well, has had high volume of urine s/p Lasix. Developing some back pain 2/2 hospital bed." Seen by ENT in ED: "presents w/ likely angioedema given arytenoid edema on scope exam." Given angioedema cocktail. Leukocytosis: "ISO recent steroid use vs. new infection... monitor off abx." Per pulmonary in ED: "Significant upper airway high pitched wheeze with sounds transmitted on lung auscultation. Agree with ENT eval. steroids per ENT, resume steroid taper when complete. consider SLP eval." "Her chest imaging was largely unchanged from 12/5 and significantly improved from months prior in 9/2023." "She has been on high dose steroids. And is at risk for atypical infections."   MBS 10/2/23 with recommendation for minced and moist and thin liquids. See report.  Received alert and awake with HHA at bedside. Receiving 1L supplemental O2 via nasal canula. SpO2 97-98%. Moderate dysphonia. Reports a change in her voice since ECMO with improvements overall since initially post ECMO, however, recent dysphonia exhibited. Able to follow directives and provide information re: medical history. Maintained SpO2 97-98% Per H&P: 62yo woman with "PMH of pAfib, sciatica, anxiety, ILD, admission 9/13-10/5 (transfer from [facility] to [facility] for SOB, during which she required ECMO, plasmapheresis/rituximab, high-dose steroids, ccb platelet transfusions, dysphagia, RIJ DVT, discharged on steroid taper, recently discharged 12/20 from [facility], who presents with worsening shortness of breath, cough and difficulty tolerating secretions x1-2 days. She initially wento [place] [facility] where she received IV lasix and a dose of DuoNeb with improvement of symptoms, preferred to come to [facility] for admission. RVP negative at [place], leukocytosis of 14. CT chest unchanged. She was unable to take her steroids today 2/2 secretions. No fever. Cough noted, especially after she had some water. Was told she likely has JACEY, [facility]. Toileting well, has had high volume of urine s/p Lasix. Developing some back pain 2/2 hospital bed." Seen by ENT in ED: "presents w/ likely angioedema given arytenoid edema on scope exam." Given angioedema cocktail. Leukocytosis: "ISO recent steroid use vs. new infection... monitor off abx." Per pulmonary in ED: "Significant upper airway high pitched wheeze with sounds transmitted on lung auscultation. Agree with ENT eval. steroids per ENT, resume steroid taper when complete. consider SLP eval." "Her chest imaging was largely unchanged from 12/5 and significantly improved from months prior in 9/2023." "She has been on high dose steroids. And is at risk for atypical infections."   MBS 10/2/23 with recommendation for minced and moist and thin liquids. See report.  Received alert and awake with HHA at bedside. Receiving 1L supplemental O2 via nasal canula. SpO2 97-98%. Moderate dysphonia. Reports a change in her voice since ECMO with improvements overall since initially post ECMO, however, recent dysphonia exhibited. Able to follow directives and provide information re: medical history.

## 2024-01-01 NOTE — DISCHARGE NOTE PROVIDER - NSDCCPTREATMENT_GEN_ALL_CORE_FT
PRINCIPAL PROCEDURE  Procedure: CT chest wo con  Findings and Treatment: FINDINGS:  LUNGS AND AIRWAYS: Patent central airways.  Minimal bibasilar atelectatic   changes. No pulmonary nodules. Unchanged bibasilar reticular opacities   with predominantly subpleural distribution. No honeycombing. Additional   interstitial interlobular septal thickening in the lower lobes is also   similar to prior study. No consolidation.  PLEURA: No pleural effusion. No pneumoperitoneum.  MEDIASTINUM AND NIRMALA: No lymphadenopathy.  VESSELS: Within normal limits.  HEART: Heart size is normal. No pericardial effusion.  CHEST WALL AND LOWER NECK: Normal thyroid gland.  VISUALIZED UPPER ABDOMEN: Tiny hypodensity in the caudate lobe of the   liver. Cholelithiasis. Small hiatal hernia.  Partially visualized spleen, adrenal glands, pancreas and stomach within   normal limits.  BONES: Mild discogenic degenerative changes of the spine.  IMPRESSION:  Unchanged and persistent reticular opacities predominantly in bilateral   lower lobe subpleural distribution.  Additional interstitial interlobular septal thickening in bilateral lower   lobes may represent a component of pulmonary congestion. Underlying   interstitial lung disease cannot be excluded. Overall no significant   change to 12/5/2023.

## 2024-01-01 NOTE — DISCHARGE NOTE PROVIDER - NSFOLLOWUPCLINICSTOKEN_GEN_ALL_ED_FT
[id number]: || ||00\01||False; [id number]: || ||00\01||False; [id number]: || ||00\01||False;[id number]: || ||00\01||False; [id number]: || ||00\01||False;[id number]: || ||00\01||False;

## 2024-01-01 NOTE — H&P ADULT - NSHPPHYSICALEXAM_GEN_ALL_CORE
GENERAL: Sitting in bed, in no acute distress, though notably unable to complete sentence in one breath  EYES: Conjunctiva noninjected or pale, sclera anicteric  HENT: NC/AT, moist mucous membranes, Mallampati IV  NECK: Supple, trachea midline  LUNG: Bibasilar crackles  CV: RRR, Pulses- Radial: 2+ b/l  ABDOMEN: Nondistended, nontender  MSK: Chronic tender lymphedema b/l  SKIN: No rashes, bruises  NEURO: AAOx4 (to person, place, time, event), no tremor  PSYCH: Normal mood and affect Vital Signs Last 24 Hrs  T(C): 36.5 (01 Jan 2024 07:29), Max: 37.4 (31 Dec 2023 10:59)  T(F): 97.7 (01 Jan 2024 07:29), Max: 99.3 (31 Dec 2023 10:59)  HR: 86 (01 Jan 2024 07:29) (70 - 99)  BP: 106/70 (01 Jan 2024 07:29) (106/70 - 127/70)  RR: 18 (01 Jan 2024 07:29) (18 - 28)  SpO2: 95% (01 Jan 2024 07:29) (94% - 100%)    Parameters below as of 01 Jan 2024 07:29  Patient On (Oxygen Delivery Method): room air    GENERAL: Sitting in bed, in no acute distress, though notably unable to complete sentence in one breath [attending exam - appeared more comfortable]  EYES: Conjunctiva non-injected or pale, sclera anicteric  HENT: NC/AT, moist mucous membranes, Mallampati IV  NECK: Supple, trachea midline  LUNG: Bibasilar crackles; good air movement; upper airway wheezing noted at times  CV: S1S2 RRR, No M/R/G, Pulses- Radial/DP: 2+ b/l  ABDOMEN: Nondistended, nontender, soft, (+)BS  MSK: Chronic tender lymphedema b/l  SKIN: No rashes, bruises   NEURO: AAOx4 (to person, place, time, event), no tremor  PSYCH: Normal mood and affect

## 2024-01-01 NOTE — PROGRESS NOTE ADULT - PROBLEM SELECTOR PLAN 5
Patient on Eliquis at home, also with DVT  - Unable to tolerate PO Eliquis, will start on Lovenox BID  - Unable to tolerate PO metoprolol, will start IV lopressor 2.5 mg q6 with hold parameters

## 2024-01-01 NOTE — PATIENT PROFILE ADULT - IS THERE A SUSPICION OF ABUSE/NEGLIGENCE?
(C34.10) Malignant neoplasm of upper lobe of lung, unspecified laterality (H)  (primary encounter diagnosis)  Comment: Plan to follow up in oncology and last CT was in September  Plan: CBC with platelets, Comprehensive metabolic         panel, TSH with free T4 reflex            (F10.21) Alcohol dependence in remission (H)  Comment: Doing great - no issues  Plan: TSH with free T4 reflex            (J44.9) Chronic obstructive pulmonary disease, unspecified COPD type (H)  Comment: COPD is stable - I would recommend getting the theraband back to work on shoulder and upper back strength.  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

## 2024-01-01 NOTE — H&P ADULT - NSHPSOCIALHISTORY_GEN_ALL_CORE
Tobacco use at age 13, no alcohol or recreational drug use. Has been re-learning walk with walker Tobacco use at age 13, no alcohol or recreational drug use. Has been re-learning walk with walker  Has private hire HHA; getting PT, currently staying in a hotel to be closer to these providers

## 2024-01-01 NOTE — H&P ADULT - HISTORY OF PRESENT ILLNESS
Ms. Hager is a  64 YO woman with PMH of pAfib, sciatica, anxiety, ILD, admission 10/25/23-12/20/23 (transfer from [facility] to [facility]) for SOB, during which she required ECMO, plasmapheresis/rituximab, high-dose steroids, ccb platelet transfusions, dysphagia, RIJ DVT, discharged on steroid taper who re-presents from [facility] with worsening shortness of breath, cough and difficulty tolerating secretions x1-2 days. She initially wento [place] ED where she recieved IV lasix and a dose of DuoNeb with improvement of symptoms, preferred to come to [facility] for admisison. RVP negative at [place], leukocytosis of 14. CT chest unchanged. She was unable to take her steroids today 2/2 secretions. No fever. Cough noted, especially after she had some water. Was told she likely has JACEY. Toileting well, has had high volume of urine s/p Lasix. Developing some back pain 2/2 hospital bed.       In ED, patient was evaluated by ENT with laryngoscopy, found to have edema of the arytenoids, continued on steroids   Ms. Hager is a  62 YO woman with PMH of pAfib, sciatica, anxiety, ILD, admission 10/25/23-12/20/23 (transfer from [facility] to [facility]) for SOB, during which she required ECMO, plasmapheresis/rituximab, high-dose steroids, ccb platelet transfusions, dysphagia, RIJ DVT, discharged on steroid taper who re-presents from [facility] with worsening shortness of breath, cough and difficulty tolerating secretions x1-2 days. She initially wento [place] [facility] where she recieved IV lasix and a dose of DuoNeb with improvement of symptoms, preferred to come to [facility] for admisison. RVP negative at [place], leukocytosis of 14. CT chest unchanged. She was unable to take her steroids today 2/2 secretions. No fever. Cough noted, especially after she had some water. Was told she likely has JACEY. Toileting well, has had high volume of urine s/p Lasix. Developing some back pain 2/2 hospital bed.       In ED, patient was evaluated by ENT with laryngoscopy, found to have edema of the arytenoids, continued on steroids   Ms. Hager is a  64 YO woman with PMH of pAfib, sciatica, anxiety, ILD, admission 9/13-10/5 (transfer from [facility] to [facility]) for SOB, during which she required ECMO, plasmapheresis/rituximab, high-dose steroids, ccb platelet transfusions, dysphagia, RIJ DVT, discharged on steroid taper, recently discharged 12/20 from [facility], who presents with worsening shortness of breath, cough and difficulty tolerating secretions x1-2 days. She initially wento [place] [facility] where she recieved IV lasix and a dose of DuoNeb with improvement of symptoms, preferred to come to [facility] for admisison. RVP negative at [place], leukocytosis of 14. CT chest unchanged. She was unable to take her steroids today 2/2 secretions. No fever. Cough noted, especially after she had some water. Was told she likely has JACEY, noted to be desaturating to 70s overnight at [facility] on personal monitor, awaiting CPAP evaluation. Toileting well, has had high volume of urine s/p Lasix. Developing some back pain 2/2 hospital bed.     In ED, patient was evaluated by ENT with laryngoscopy, found to have edema of the arytenoids, started on angioedema cocktail - dexamethasone, diphenhydramine, famotidine   Ms. Hager is a  62 YO woman with PMH of pAfib, sciatica, anxiety, ILD, admission 9/13-10/5 (transfer from [facility] to [facility]) for SOB, during which she required ECMO, plasmapheresis/rituximab, high-dose steroids, ccb platelet transfusions, dysphagia, RIJ DVT, discharged on steroid taper, recently discharged 12/20 from [facility], who presents with worsening shortness of breath, cough and difficulty tolerating secretions x1-2 days. She initially wento [place] [facility] where she recieved IV lasix and a dose of DuoNeb with improvement of symptoms, preferred to come to [facility] for admisison. RVP negative at [place], leukocytosis of 14. CT chest unchanged. She was unable to take her steroids today 2/2 secretions. No fever. Cough noted, especially after she had some water. Was told she likely has JACEY, noted to be desaturating to 70s overnight at [facility] on personal monitor, awaiting CPAP evaluation. Toileting well, has had high volume of urine s/p Lasix. Developing some back pain 2/2 hospital bed.     In ED, patient was evaluated by ENT with laryngoscopy, found to have edema of the arytenoids, started on angioedema cocktail - dexamethasone, diphenhydramine, famotidine   Ms. Hager is a  62 YO woman with PMH of pAfib, sciatica, anxiety, ILD, admission 9/13-10/5 (transfer from [facility] to [facility]) for SOB, during which she required ECMO, plasmapheresis/rituximab, high-dose steroids, ccb platelet transfusions, dysphagia, RIJ DVT, discharged on steroid taper, recently discharged 12/20 from [facility], who presents with worsening shortness of breath, cough and difficulty tolerating secretions x1-2 days. She initially wento [place] [facility] where she recieved IV lasix and a dose of DuoNeb with improvement of symptoms, preferred to come to [facility] for admission. RVP negative at [place], leukocytosis of 14. CT chest unchanged. She was unable to take her steroids today 2/2 secretions. No fever. Cough noted, especially after she had some water. Was told she likely has JACEY, noted to be desaturating to 70s overnight at [facility] on personal monitor, awaiting CPAP evaluation. Toileting well, has had high volume of urine s/p Lasix. Developing some back pain 2/2 hospital bed.     In ED, patient was evaluated by ENT with laryngoscopy, found to have edema of the arytenoids, started on angioedema cocktail - dexamethasone, diphenhydramine, famotidine   Ms. Hager is a  64 YO woman with PMH of pAfib, sciatica, anxiety, ILD, admission 9/13-10/5 (transfer from [facility] to [facility]) for SOB, during which she required ECMO, plasmapheresis/rituximab, high-dose steroids, ccb platelet transfusions, dysphagia, RIJ DVT, discharged on steroid taper, recently discharged 12/20 from [facility], who presents with worsening shortness of breath, cough and difficulty tolerating secretions x1-2 days. She initially wento [facility] where she recieved IV lasix and a dose of DuoNeb with improvement of symptoms, preferred to come to [facility] for admission. RVP negative at [facility], leukocytosis of 14. CT chest unchanged. She was unable to take her steroids today 2/2 secretions. No fever. Cough noted, especially after she had some water. Was told she likely has JACEY, noted to be desaturating to 70s overnight at [facility] on personal monitor, awaiting CPAP evaluation. Toileting well, has had high volume of urine s/p Lasix. Developing some back pain 2/2 hospital bed.     In ED, patient was evaluated by ENT with laryngoscopy, found to have edema of the arytenoids, started on angioedema cocktail - dexamethasone, diphenhydramine, famotidine

## 2024-01-01 NOTE — ED ADULT NURSE REASSESSMENT NOTE - GENERAL PATIENT STATE
Pt axox4, received pt on Venti mask 40% Pt rest even unlabored at rest. vss see flow sheet charting./comfortable appearance/family/SO at bedside

## 2024-01-01 NOTE — H&P ADULT - ATTENDING COMMENTS
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 Only "new" medication patient report is celecoxib, which she restarted on 12/27. She saw a  doctor prior to arrival who Rx'ed amox/clav which she reports taking PTA however this was after onset of symptoms. She denies eczema/asthma/allergies. Appreciate pulm and ENT recs. Per RN coughing with water still, will keep NPO pending S&S eval. Confirm further steroid dosing recs with ENT this AM. Consider transfer to [facility] this AM, called to present case, they will eval and call resident if they wish to accept. Monitor on .    Patient had an extensive hospitalization in 8-10/2023 at [facility] [facility] for acute hypoxemic respiratory failure requiring intubation and VV- ECMO on 9/13, then transferred to [facility], concerning for DAH vs ILD flare, decannulated from VV-ECMO 9/21, extubated 9/22. S/p pulse steroids, PLEX (9/15, 9/18, 9/20) and Rituximab (9/16 & 10/3). Course c/b severe leukopenia s/p filgrastrim, shock liver with slow to resolve hyperbilirubinemia, RIJ DVT, oropharyngeal dysphagia, and recurrent SVT. Repeat CT chest on 9/30 with markedly improved bilateral groundglass opacities with resolved lung consolidations. Patient was discharged from [facility] 10/05/23 to rehab at , recently discharged 12/20/23, receiving tapering dose of medrol. 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 Only "new" medication patient report is celecoxib, which she restarted on 12/27. She saw a  doctor prior to arrival who Rx'ed amox/clav which she reports taking PTA however this was after onset of symptoms. She denies eczema/asthma/allergies. Appreciate pulm and ENT recs. Per RN coughing with water still, will keep NPO pending S&S eval. Confirm further steroid dosing recs with ENT this AM. Consider transfer to [facility] this AM, called to present case, they will eval and call resident if they wish to accept. Monitor on .    Patient had an extensive hospitalization in 8-10/2023 at [facility] [facility] for acute hypoxemic respiratory failure requiring intubation and VV- ECMO on 9/13, then transferred to [facility], concerning for DAH vs ILD flare, decannulated from VV-ECMO 9/21, extubated 9/22. S/p pulse steroids, PLEX (9/15, 9/18, 9/20) and Rituximab (9/16 & 10/3). Course c/b severe leukopenia s/p filgrastrim, shock liver with slow to resolve hyperbilirubinemia, RIJ DVT, oropharyngeal dysphagia, and recurrent SVT. Repeat CT chest on 9/30 with markedly improved bilateral groundglass opacities with resolved lung consolidations. Patient was discharged from [facility] 10/05/23 to rehab at , recently discharged 12/20/23, receiving tapering dose of medrol. 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 Only "new" medication patient report is celecoxib, which she restarted on 12/27. She saw a  doctor prior to arrival who Rx'ed amox/clav which she reports taking PTA however this was after onset of symptoms. She denies eczema/asthma/allergies. Appreciate pulm and ENT recs. Per RN coughing with water still, will keep NPO pending S&S eval. Confirm further steroid dosing recs with ENT this AM. Consider transfer to [facility] this AM, called to present case, they will eval and call resident if they wish to accept. Monitor on .    Patient had an extensive hospitalization in 8-10/2023 at [facility] for acute hypoxemic respiratory failure requiring intubation and VV- ECMO on 9/13, then transferred to [facility], concerning for DAH vs ILD flare, decannulated from VV-ECMO 9/21, extubated 9/22. S/p pulse steroids, PLEX (9/15, 9/18, 9/20) and Rituximab (9/16 & 10/3). Course c/b severe leukopenia s/p filgrastrim, shock liver with slow to resolve hyperbilirubinemia, RIJ DVT, oropharyngeal dysphagia, and recurrent SVT. Repeat CT chest on 9/30 with markedly improved bilateral groundglass opacities with resolved lung consolidations. Patient was discharged from [facility] 10/05/23 to rehab at , recently discharged 12/20/23, receiving tapering dose of medrol. 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 Only "new" medication patient report is celecoxib, which she restarted on 12/27. She saw a  doctor prior to arrival who Rx'ed amox/clav which she reports taking PTA however this was after onset of symptoms. She denies eczema/asthma/allergies. Appreciate pulm and ENT recs. Per RN coughing with water still, will keep NPO pending S&S eval. Confirm further steroid dosing recs with ENT this AM. Consider transfer to [facility] this AM, called to present case, they will eval and call resident if they wish to accept. Monitor on .    Patient had an extensive hospitalization in 8-10/2023 at [facility] for acute hypoxemic respiratory failure requiring intubation and VV- ECMO on 9/13, then transferred to [facility], concerning for DAH vs ILD flare, decannulated from VV-ECMO 9/21, extubated 9/22. S/p pulse steroids, PLEX (9/15, 9/18, 9/20) and Rituximab (9/16 & 10/3). Course c/b severe leukopenia s/p filgrastrim, shock liver with slow to resolve hyperbilirubinemia, RIJ DVT, oropharyngeal dysphagia, and recurrent SVT. Repeat CT chest on 9/30 with markedly improved bilateral groundglass opacities with resolved lung consolidations. Patient was discharged from [facility] 10/05/23 to rehab at , recently discharged 12/20/23, receiving tapering dose of medrol.

## 2024-01-01 NOTE — H&P ADULT - ASSESSMENT
Ms. Hager is a  62 YO woman with PMH of pAfib, sciatica, anxiety, ILD, admission 10/25/23-12/20/23 (transfer from [facility] to [facility]) for SOB, during which she required ECMO, plasmapheresis/rituximab, high-dose steroids, ccb platelet transfusions, dysphagia, RIJ DVT, discharged on steroid taper who [facility] with worsening shortness of breath, cough and difficulty tolerating secretions x1-2 days, found to have laryngeal edema.  Ms. Hager is a  64 YO woman with PMH of pAfib, sciatica, anxiety, ILD, admission 10/25/23-12/20/23 (transfer from [facility] to [facility]) for SOB, during which she required ECMO, plasmapheresis/rituximab, high-dose steroids, ccb platelet transfusions, dysphagia, RIJ DVT, discharged on steroid taper who [facility] with worsening shortness of breath, cough and difficulty tolerating secretions x1-2 days, found to have laryngeal edema.

## 2024-01-01 NOTE — SWALLOW BEDSIDE ASSESSMENT ADULT - PHARYNGEAL PHASE
Within functional limits SpO2 decreased from 97% to 92% during sequential straw sips of thin liquids/Within functional limits

## 2024-01-01 NOTE — SWALLOW BEDSIDE ASSESSMENT ADULT - SWALLOW EVAL: RECOMMENDED FEEDING/EATING TECHNIQUES
Single sips/allow for swallow between intakes/alternate food with liquid/maintain upright posture during/after eating for 30 mins/position upright (90 degrees)/small sips/bites

## 2024-01-01 NOTE — H&P ADULT - NSICDXPASTMEDICALHX_GEN_ALL_CORE_FT
PAST MEDICAL HISTORY:  Afib     Deep vein thrombosis (DVT)     Interstitial lung disease     Lymphedema     Sciatica

## 2024-01-01 NOTE — DISCHARGE NOTE PROVIDER - NSDCCPCAREPLAN_GEN_ALL_CORE_FT
PRINCIPAL DISCHARGE DIAGNOSIS  Diagnosis: Angioedema  Assessment and Plan of Treatment: You were evaluated for increased secretions and difficulty breathing, and found to have swelling of the upper airway structures in your throat. You were seen by both the ENT and pulmonary teams, and you were treated with a regimen of dexamethasone, diphenhydramine, and famotidine, which improved your symptoms.   Information for the allergy clinic is provided, given an environmental trigger may have triggered this episode.  Return to the hospital if you experience recurrence of symptoms.     PRINCIPAL DISCHARGE DIAGNOSIS  Diagnosis: Angioedema  Assessment and Plan of Treatment: You were evaluated for increased secretions and difficulty breathing, and found to have swelling of the upper airway structures in your throat. You were seen by both the ENT and pulmonary teams, and you were treated with a regimen of IV dexamethasone, diphenhydramine, and famotidine, which improved your symptoms. An infectious workup was sent which was negative.  Information for the allergy clinic is provided, given an environmental trigger may have triggered this episode.  Return to the hospital if you experience recurrence of symptoms.     PRINCIPAL DISCHARGE DIAGNOSIS  Diagnosis: Angioedema  Assessment and Plan of Treatment: You were evaluated for increased secretions and difficulty breathing, and found to have swelling of the upper airway structures in your throat. You were seen by both the ENT and pulmonary teams, and you were treated with a regimen of IV dexamethasone, diphenhydramine, and famotidine, which improved your symptoms. An infectious workup was sent which was negative.  .  You are being discharged with a methylprednisolone taper beginning at 32 mg which you should begin taking at home on 1/5/24. We are also stopping your celcoxib and sending you a prescription for tramadol as needed. An epipen prescription was also sent for severe allergic reactions in the future.  .  methylprednisolone 32mg daily x5 days (1/4-1/8)  methylprednisolone 28mg daily x5 days (1/9-1/13)  methylprednisolone 24mg daily x5 days (1/14-1/18)  methylprednisolone 20mg daily x5 days (1/19-1/23)  methylprednisolone 16mg daily x5 days (1/24-1/28)  methylprednisolone 12mg daily x5 days (1/29-2/2)  methylprednisolone 8mg daily x5 days (2/3-2/7)  methylprednisolone 4mg daily x5 days (2/8-2/12)  .  Please follow up with the pulmonary and rheumatology teams. You have an appointment with the pulmonologist, Dr. Myles on 1/4 at 1:45. Please also follow up with an allergist as an environmental cause may have triggered this episode of angioedema. Information for the clinic in [place] is provided.  .  Immediately seek help if you feel your breathing status is worsening, you have trouble with secretions, or if your symptoms reccur.

## 2024-01-01 NOTE — DISCHARGE NOTE PROVIDER - NSDCFUSCHEDAPPT_GEN_ALL_CORE_FT
Marie López  [facility]  PHYSMED [address]  Scheduled Appointment: 01/05/2024    [facility] [address]  Scheduled Appointment: 01/17/2024    Addy Powers  [facility] [address]  Scheduled Appointment: 01/17/2024     Marie López  [facility]  PHYSMED [address]  Scheduled Appointment: 01/05/2024    [facility] Physician [facility] [address]  Scheduled Appointment: 01/17/2024    Addy Powers  [facility] [address]  Scheduled Appointment: 01/17/2024     [address]  Scheduled Appointment: 01/17/2024    Addy Powers  [address]  Scheduled Appointment: 01/17/2024     [address]  Scheduled Appointment: 01/17/2024    Addy Powers  [address]  Scheduled Appointment: 01/17/2024     [facility] Physician [facility] [address]  Scheduled Appointment: 01/17/2024    Addy Powers  [facility] [address]  Scheduled Appointment: 01/17/2024    Marie López  [facility] [address]  Scheduled Appointment: 01/19/2024     [facility] Physician [facility] [address]  Scheduled Appointment: 01/17/2024    Addy Powers  [facility] [address]  Scheduled Appointment: 01/17/2024    Marie López  [facility] Physician [facility] [address]  Scheduled Appointment: 01/19/2024     [facility] Physician [facility]  PULMMED [address]  Scheduled Appointment: 01/04/2024    [facility] Physician [facility]  PULED [address]  Scheduled Appointment: 01/17/2024    Addy Powers  [facility] Physician [facility]  PULED [address]  Scheduled Appointment: 01/17/2024    Marie López  [facility] Physician [facility]  PHYSMED [address]  Scheduled Appointment: 01/19/2024     [facility] Physician [facility]  PULMMED [address]  Scheduled Appointment: 01/04/2024    [facility] Physician [facility]  PULED [address]  Scheduled Appointment: 01/17/2024    Addy Powers  [facility] Physician [facility]  PULED [address]  Scheduled Appointment: 01/17/2024    Marie López  [facility] Physician [facility]  PHYSMED [address]  Scheduled Appointment: 01/19/2024

## 2024-01-01 NOTE — PROGRESS NOTE ADULT - ATTENDING COMMENTS
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 Only "new" medication patient report is celecoxib, which she restarted on 12/27. She saw a  doctor prior to arrival who Rx'ed amox/clav which she reports taking PTA however this was after onset of symptoms. She denies eczema/asthma/allergies. Appreciate pulm and ENT recs.   -  S&S eval.: cleared for diet

## 2024-01-01 NOTE — PROGRESS NOTE ADULT - PROBLEM SELECTOR PLAN 3
Crackles in lungs, CT with ILD  - Steroids as above  - Pulm recs appreciated  - Notes she desats during sleep for which she was started on 1L NC but she feels it does not help as she mainly breathes through her mouth. Started on venturi mask  - Noted to be on atovaquone prophylaxis as outpatient, will follow up S&S eval - if tolerates will restart, if not will need alternative

## 2024-01-01 NOTE — DISCHARGE NOTE PROVIDER - HOSPITAL COURSE
HPI:  Ms. Hager is a  64 YO woman with PMH of pAfib, sciatica, anxiety, ILD, admission 9/13-10/5 (transfer from [facility] to [facility]) for SOB, during which she required ECMO, plasmapheresis/rituximab, high-dose steroids, ccb platelet transfusions, dysphagia, RIJ DVT, discharged on steroid taper, recently discharged 12/20 from [facility], who presents with worsening shortness of breath, cough and difficulty tolerating secretions x1-2 days. She initially wento [facility] where she recieved IV lasix and a dose of DuoNeb with improvement of symptoms, preferred to come to [facility] for admission. RVP negative at [facility], leukocytosis of 14. CT chest unchanged. She was unable to take her steroids today 2/2 secretions. No fever. Cough noted, especially after she had some water. Was told she likely has JACEY, noted to be desaturating to 70s overnight at [facility] on personal monitor, awaiting CPAP evaluation. Toileting well, has had high volume of urine s/p Lasix. Developing some back pain 2/2 hospital bed.     In ED, patient was evaluated by ENT with laryngoscopy, found to have edema of the arytenoids, started on angioedema cocktail - dexamethasone, diphenhydramine, famotidine   (01 Jan 2024 01:41)    Hospital Course:  ENT consulted and pt found to have edematous arytenoids. Started on dexamethasone, diphenhydramine, famotidine regimen with improvement in symptoms. Also seen by pulmonary team. Infectious workup negative thus far.      Important Medication Changes and Reason:    Active or Pending Issues Requiring Follow-up:  -allergy clinic information provided    Advanced Directives:   [x ] Full code  [ ] DNR  [ ] Hospice    Discharge Diagnoses:  #Angioedema       HPI:  Ms. Hager is a  62 YO woman with PMH of pAfib, sciatica, anxiety, ILD, admission 9/13-10/5 (transfer from [facility] to [facility]) for SOB, during which she required ECMO, plasmapheresis/rituximab, high-dose steroids, ccb platelet transfusions, dysphagia, RIJ DVT, discharged on steroid taper, recently discharged 12/20 from [facility], who presents with worsening shortness of breath, cough and difficulty tolerating secretions x1-2 days. She initially wento [facility] where she recieved IV lasix and a dose of DuoNeb with improvement of symptoms, preferred to come to [facility] for admission. RVP negative at [facility], leukocytosis of 14. CT chest unchanged. She was unable to take her steroids today 2/2 secretions. No fever. Cough noted, especially after she had some water. Was told she likely has JACEY, noted to be desaturating to 70s overnight at [facility] on personal monitor, awaiting CPAP evaluation. Toileting well, has had high volume of urine s/p Lasix. Developing some back pain 2/2 hospital bed.     In ED, patient was evaluated by ENT with laryngoscopy, found to have edema of the arytenoids, started on angioedema cocktail - dexamethasone, diphenhydramine, famotidine   (01 Jan 2024 01:41)    Hospital Course:  ENT consulted and pt found to have edematous arytenoids. Started on dexamethasone, diphenhydramine, famotidine regimen with improvement in symptoms. Also seen by pulmonary team. Infectious workup negative thus far.      Important Medication Changes and Reason:    Active or Pending Issues Requiring Follow-up:  -allergy clinic information provided    Advanced Directives:   [x ] Full code  [ ] DNR  [ ] Hospice    Discharge Diagnoses:  #Angioedema       HPI:  Ms. Hager is a  62 YO woman with PMH of pAfib, sciatica, anxiety, ILD, admission 9/13-10/5 (transfer from [facility] to [facility]) for SOB, during which she required ECMO, plasmapheresis/rituximab, high-dose steroids, ccb platelet transfusions, dysphagia, RIJ DVT, discharged on steroid taper, recently discharged 12/20 from [facility], who presents with worsening shortness of breath, cough and difficulty tolerating secretions x1-2 days. She initially wento [facility] where she recieved IV lasix and a dose of DuoNeb with improvement of symptoms, preferred to come to [facility] for admission. RVP negative at [facility], leukocytosis of 14. CT chest unchanged. She was unable to take her steroids today 2/2 secretions. No fever. Cough noted, especially after she had some water. Was told she likely has JACEY, noted to be desaturating to 70s overnight at [facility] on personal monitor, awaiting CPAP evaluation. Toileting well, has had high volume of urine s/p Lasix. Developing some back pain 2/2 hospital bed.     In ED, patient was evaluated by ENT with laryngoscopy, found to have edema of the arytenoids, started on angioedema cocktail - dexamethasone, diphenhydramine, famotidine   (01 Jan 2024 01:41)    Hospital Course:  ENT consulted and pt found to have edematous arytenoids. Started on dexamethasone, diphenhydramine, famotidine regimen with improvement in symptoms. Also seen by pulmonary team. Infectious workup negative thus far. Cleared for diet.      Important Medication Changes and Reason:    Active or Pending Issues Requiring Follow-up:  -allergy clinic information provided    Advanced Directives:   [x ] Full code  [ ] DNR  [ ] Hospice    Discharge Diagnoses:  #Angioedema       HPI:  Ms. Hager is a  64 YO woman with PMH of pAfib, sciatica, anxiety, ILD, admission 9/13-10/5 (transfer from [facility] to [facility]) for SOB, during which she required ECMO, plasmapheresis/rituximab, high-dose steroids, ccb platelet transfusions, dysphagia, RIJ DVT, discharged on steroid taper, recently discharged 12/20 from [facility], who presents with worsening shortness of breath, cough and difficulty tolerating secretions x1-2 days. She initially wento [facility] where she recieved IV lasix and a dose of DuoNeb with improvement of symptoms, preferred to come to [facility] for admission. RVP negative at [facility], leukocytosis of 14. CT chest unchanged. She was unable to take her steroids today 2/2 secretions. No fever. Cough noted, especially after she had some water. Was told she likely has JACEY, noted to be desaturating to 70s overnight at [facility] on personal monitor, awaiting CPAP evaluation. Toileting well, has had high volume of urine s/p Lasix. Developing some back pain 2/2 hospital bed.     In ED, patient was evaluated by ENT with laryngoscopy, found to have edema of the arytenoids, started on angioedema cocktail - dexamethasone, diphenhydramine, famotidine   (01 Jan 2024 01:41)    Hospital Course:  ENT consulted and pt found to have edematous arytenoids. Started on dexamethasone, diphenhydramine, famotidine regimen with improvement in symptoms. Also seen by pulmonary team. Infectious workup negative thus far. Cleared for diet.      Important Medication Changes and Reason:    Active or Pending Issues Requiring Follow-up:  -allergy clinic information provided    Advanced Directives:   [x ] Full code  [ ] DNR  [ ] Hospice    Discharge Diagnoses:  #Angioedema       HPI:  Ms. Hager is a  62 YO woman with PMH of pAfib, sciatica, anxiety, ILD, admission 9/13-10/5 (transfer from [facility] to [facility]) for SOB, during which she required ECMO, plasmapheresis/rituximab, high-dose steroids, ccb platelet transfusions, dysphagia, RIJ DVT, discharged on steroid taper, recently discharged 12/20 from [facility], who presents with worsening shortness of breath, cough and difficulty tolerating secretions x1-2 days. She initially wento [facility] where she recieved IV lasix and a dose of DuoNeb with improvement of symptoms, preferred to come to [facility] for admission. RVP negative at [facility], leukocytosis of 14. CT chest unchanged. She was unable to take her steroids today 2/2 secretions. No fever. Cough noted, especially after she had some water. Was told she likely has JACEY, noted to be desaturating to 70s overnight at [facility] on personal monitor, awaiting CPAP evaluation. Toileting well, has had high volume of urine s/p Lasix. Developing some back pain 2/2 hospital bed.     In ED, patient was evaluated by ENT with laryngoscopy, found to have edema of the arytenoids, started on angioedema cocktail - dexamethasone, diphenhydramine, famotidine   (01 Jan 2024 01:41)    Hospital Course:  ENT consulted and pt found to have edematous arytenoids. Started on IV dexamethasone, diphenhydramine, famotidine regimen with improvement in symptoms. Also seen by pulmonary team. Infectious workup negative. Cleared for diet. Home medications restarted.      Important Medication Changes and Reason:    Active or Pending Issues Requiring Follow-up:  -allergy clinic information provided    Advanced Directives:   [x ] Full code  [ ] DNR  [ ] Hospice    Discharge Diagnoses:  #Angioedema       HPI:  Ms. Hager is a  62 YO woman with PMH of pAfib, sciatica, anxiety, ILD, admission 9/13-10/5 (transfer from [facility] to [facility]) for SOB, during which she required ECMO, plasmapheresis/rituximab, high-dose steroids, ccb platelet transfusions, dysphagia, RIJ DVT, discharged on steroid taper, recently discharged 12/20 from [facility], who presents with worsening shortness of breath, cough and difficulty tolerating secretions x1-2 days. She initially wento [facility] where she recieved IV lasix and a dose of DuoNeb with improvement of symptoms, preferred to come to [facility] for admission. RVP negative at [facility], leukocytosis of 14. CT chest unchanged. She was unable to take her steroids today 2/2 secretions. No fever. Cough noted, especially after she had some water. Was told she likely has JACEY, noted to be desaturating to 70s overnight at [facility] on personal monitor, awaiting CPAP evaluation. Toileting well, has had high volume of urine s/p Lasix. Developing some back pain 2/2 hospital bed.     In ED, patient was evaluated by ENT with laryngoscopy, found to have edema of the arytenoids, started on angioedema cocktail - dexamethasone, diphenhydramine, famotidine   (01 Jan 2024 01:41)    Hospital Course:  ENT consulted and pt found to have edematous arytenoids. Started on IV dexamethasone, diphenhydramine, famotidine regimen with improvement in symptoms. Also seen by pulmonary team. Infectious workup negative. Cleared for diet. Home medications restarted.      Important Medication Changes and Reason:    Active or Pending Issues Requiring Follow-up:  -allergy clinic information provided    Advanced Directives:   [x ] Full code  [ ] DNR  [ ] Hospice    Discharge Diagnoses:  #Angioedema       HPI:  Ms. Hager is a  64 YO woman with PMH of pAfib, sciatica, anxiety, ILD, admission 9/13-10/5 (transfer from [facility] to [facility]) for SOB, during which she required ECMO, plasmapheresis/rituximab, high-dose steroids, ccb platelet transfusions, dysphagia, RIJ DVT, discharged on steroid taper, recently discharged 12/20 from [facility], who presents with worsening shortness of breath, cough and difficulty tolerating secretions x1-2 days. She initially wento [facility] where she recieved IV lasix and a dose of DuoNeb with improvement of symptoms, preferred to come to [facility] for admission. RVP negative at [facility], leukocytosis of 14. CT chest unchanged. She was unable to take her steroids today 2/2 secretions. No fever. Cough noted, especially after she had some water. Was told she likely has JACEY, noted to be desaturating to 70s overnight at [facility] on personal monitor, awaiting CPAP evaluation. Toileting well, has had high volume of urine s/p Lasix. Developing some back pain 2/2 hospital bed.     In ED, patient was evaluated by ENT with laryngoscopy, found to have edema of the arytenoids, started on angioedema cocktail - dexamethasone, diphenhydramine, famotidine   (01 Jan 2024 01:41)    Hospital Course:  ENT consulted and pt found to have edematous arytenoids. Started on IV dexamethasone, diphenhydramine, famotidine regimen with improvement in symptoms. Also seen by pulmonary team. Infectious workup negative. Cleared for diet. Home medications restarted.      Important Medication Changes and Reason:    Active or Pending Issues Requiring Follow-up:  -allergy clinic information provided  -PSG for JACEY    Advanced Directives:   [x ] Full code  [ ] DNR  [ ] Hospice    Discharge Diagnoses:  #Angioedema       HPI:  Ms. Hager is a  64 YO woman with PMH of pAfib, sciatica, anxiety, ILD, admission 9/13-10/5 (transfer from [facility] to [facility]) for SOB, during which she required ECMO, plasmapheresis/rituximab, high-dose steroids, ccb platelet transfusions, dysphagia, RIJ DVT, discharged on steroid taper, recently discharged 12/20 from [facility], who presents with worsening shortness of breath, cough and difficulty tolerating secretions x1-2 days. She initially wento [facility] where she recieved IV lasix and a dose of DuoNeb with improvement of symptoms, preferred to come to [facility] for admission. RVP negative at [facility], leukocytosis of 14. CT chest unchanged. She was unable to take her steroids today 2/2 secretions. No fever. Cough noted, especially after she had some water. Was told she likely has JACEY, noted to be desaturating to 70s overnight at [facility] on personal monitor, awaiting CPAP evaluation. Toileting well, has had high volume of urine s/p Lasix. Developing some back pain 2/2 hospital bed.     In ED, patient was evaluated by ENT with laryngoscopy, found to have edema of the arytenoids, started on angioedema cocktail - dexamethasone, diphenhydramine, famotidine   (01 Jan 2024 01:41)    Hospital Course:  ENT consulted and pt found to have edematous arytenoids. Started on IV dexamethasone, diphenhydramine, famotidine regimen with improvement in symptoms. Also seen by pulmonary team. Infectious workup negative. Cleared for diet. Home medications restarted.      Important Medication Changes and Reason:    Active or Pending Issues Requiring Follow-up:  -allergy clinic information provided  -PSG for JACEY    Advanced Directives:   [x ] Full code  [ ] DNR  [ ] Hospice    Discharge Diagnoses:  #Angioedema       HPI:  Ms. Hager is a  64 YO woman with PMH of pAfib, sciatica, anxiety, ILD, admission 9/13-10/5 (transfer from [facility] to [facility]) for SOB, during which she required ECMO, plasmapheresis/rituximab, high-dose steroids, ccb platelet transfusions, dysphagia, RIJ DVT, discharged on steroid taper, recently discharged 12/20 from [facility], who presents with worsening shortness of breath, cough and difficulty tolerating secretions x1-2 days. She initially wento [facility] where she recieved IV lasix and a dose of DuoNeb with improvement of symptoms, preferred to come to [facility] for admission. RVP negative at [facility], leukocytosis of 14. CT chest unchanged. She was unable to take her steroids today 2/2 secretions. No fever. Cough noted, especially after she had some water. Was told she likely has JACEY, noted to be desaturating to 70s overnight at [facility] on personal monitor, awaiting CPAP evaluation. Toileting well, has had high volume of urine s/p Lasix. Developing some back pain 2/2 hospital bed.     In ED, patient was evaluated by ENT with laryngoscopy, found to have edema of the arytenoids, started on angioedema cocktail - dexamethasone, diphenhydramine, famotidine   (01 Jan 2024 01:41)    Hospital Course:  ENT consulted and pt found to have edematous arytenoids. Started on IV dexamethasone, diphenhydramine, famotidine regimen with improvement in symptoms; medications dc'd on 1/3. Cleared for diet. Also seen by pulmonary team. Pt started on methylprednisolone taper beginning at 32mg. Celecoxib dc'd as it was only new medication prior to this episode of angioedema. Infectious workup negative. Complement studies sent. Home medications restarted.      Important Medication Changes and Reason:  -methylprednisolone 32mg daily x5 days (1/4-1/8)  methylprednisolone 28mg daily x5 days (1/9-1/13)  methylprednisolone 24mg daily x5 days (1/14-1/18)  methylprednisolone 20mg daily x5 days (1/19-1/23)  methylprednisolone 16mg daily x5 days (1/24-1/28)  methylprednisolone 12mg daily x5 days (1/29-2/2)  methylprednisolone 8mg daily x5 days (2/3-2/7)  methylprednisolone 4mg daily x5 days (2/8-2/12)    -d/c celecoxib- pt's only new medication prior to this episode of angioedema    Active or Pending Issues Requiring Follow-up:  -pulm appt (Dr. Myles) 1/4 at 1:45  -allergy clinic information provided  -PSG for JACEY    Advanced Directives:   [x ] Full code  [ ] DNR  [ ] Hospice    Discharge Diagnoses:  #Angioedema       HPI:  Ms. Hager is a  62 YO woman with PMH of pAfib, sciatica, anxiety, ILD, admission 9/13-10/5 (transfer from [facility] to [facility]) for SOB, during which she required ECMO, plasmapheresis/rituximab, high-dose steroids, ccb platelet transfusions, dysphagia, RIJ DVT, discharged on steroid taper, recently discharged 12/20 from [facility], who presents with worsening shortness of breath, cough and difficulty tolerating secretions x1-2 days. She initially wento [place] [facility] where she recieved IV lasix and a dose of DuoNeb with improvement of symptoms, preferred to come to [facility] for admission. RVP negative at [place], leukocytosis of 14. CT chest unchanged. She was unable to take her steroids today 2/2 secretions. No fever. Cough noted, especially after she had some water. Was told she likely has JACEY, noted to be desaturating to 70s overnight at [facility] on personal monitor, awaiting CPAP evaluation. Toileting well, has had high volume of urine s/p Lasix. Developing some back pain 2/2 hospital bed.     In ED, patient was evaluated by ENT with laryngoscopy, found to have edema of the arytenoids, started on angioedema cocktail - dexamethasone, diphenhydramine, famotidine   (01 Jan 2024 01:41)    Hospital Course:  ENT consulted and pt found to have edematous arytenoids. Started on IV dexamethasone, diphenhydramine, famotidine regimen with improvement in symptoms; medications dc'd on 1/3. Cleared for diet. Also seen by pulmonary team. Pt started on methylprednisolone taper beginning at 32mg. Celecoxib dc'd as it was only new medication prior to this episode of angioedema. Infectious workup negative. Complement studies sent. Home medications restarted.      Important Medication Changes and Reason:  -methylprednisolone 32mg daily x5 days (1/4-1/8)  methylprednisolone 28mg daily x5 days (1/9-1/13)  methylprednisolone 24mg daily x5 days (1/14-1/18)  methylprednisolone 20mg daily x5 days (1/19-1/23)  methylprednisolone 16mg daily x5 days (1/24-1/28)  methylprednisolone 12mg daily x5 days (1/29-2/2)  methylprednisolone 8mg daily x5 days (2/3-2/7)  methylprednisolone 4mg daily x5 days (2/8-2/12)    -d/c celecoxib- pt's only new medication prior to this episode of angioedema    Active or Pending Issues Requiring Follow-up:  -pulm appt (Dr. Myles) 1/4 at 1:45  -allergy clinic information provided  -PSG for JACEY    Advanced Directives:   [x ] Full code  [ ] DNR  [ ] Hospice    Discharge Diagnoses:  #Angioedema       HPI:  Ms. Hager is a  62 YO woman with PMH of pAfib, sciatica, anxiety, ILD, admission 9/13-10/5 (transfer from [facility] to [facility]) for SOB, during which she required ECMO, plasmapheresis/rituximab, high-dose steroids, ccb platelet transfusions, dysphagia, RIJ DVT, discharged on steroid taper, recently discharged 12/20 from [facility], who presents with worsening shortness of breath, cough and difficulty tolerating secretions x1-2 days. She initially wento [facility] where she recieved IV lasix and a dose of DuoNeb with improvement of symptoms, preferred to come to [facility] for admission. RVP negative at [place], leukocytosis of 14. CT chest unchanged. She was unable to take her steroids today 2/2 secretions. No fever. Cough noted, especially after she had some water. Was told she likely has JACEY, noted to be desaturating to 70s overnight at [facility] on personal monitor, awaiting CPAP evaluation. Toileting well, has had high volume of urine s/p Lasix. Developing some back pain 2/2 hospital bed.     In ED, patient was evaluated by ENT with laryngoscopy, found to have edema of the arytenoids, started on angioedema cocktail - dexamethasone, diphenhydramine, famotidine   (01 Jan 2024 01:41)    Hospital Course:  ENT consulted and pt found to have edematous arytenoids. Started on IV dexamethasone, diphenhydramine, famotidine regimen with improvement in symptoms; medications dc'd on 1/3. Cleared for diet. Also seen by pulmonary team. Pt started on methylprednisolone taper beginning at 32mg. Celecoxib dc'd as it was the only new medication prior to this episode of angioedema. Infectious workup negative. Complement studies sent. Home medications restarted.      Important Medication Changes and Reason:  -methylprednisolone 32mg daily x5 days (1/4-1/8)  methylprednisolone 28mg daily x5 days (1/9-1/13)  methylprednisolone 24mg daily x5 days (1/14-1/18)  methylprednisolone 20mg daily x5 days (1/19-1/23)  methylprednisolone 16mg daily x5 days (1/24-1/28)  methylprednisolone 12mg daily x5 days (1/29-2/2)  methylprednisolone 8mg daily x5 days (2/3-2/7)  methylprednisolone 4mg daily x5 days (2/8-2/12)    -d/c celecoxib- pt's only new medication prior to this episode of angioedema  -tramadol 50mg q6hr prn x7d    Active or Pending Issues Requiring Follow-up:  -pulm appt (Dr. Myles) 1/4/24 at 1:45  -allergy clinic information provided  -PSG for JACEY    Advanced Directives:   [x ] Full code  [ ] DNR  [ ] Hospice    Discharge Diagnoses:  #Angioedema       HPI:  Ms. Hager is a  62 YO woman with PMH of pAfib, sciatica, anxiety, ILD, admission 9/13-10/5 (transfer from [facility] to [facility]) for SOB, during which she required ECMO, plasmapheresis/rituximab, high-dose steroids, ccb platelet transfusions, dysphagia, RIJ DVT, discharged on steroid taper, recently discharged 12/20 from [facility], who presents with worsening shortness of breath, cough and difficulty tolerating secretions x1-2 days. She initially wento [facility] where she recieved IV lasix and a dose of DuoNeb with improvement of symptoms, preferred to come to [facility] for admission. RVP negative at [facility], leukocytosis of 14. CT chest unchanged. She was unable to take her steroids today 2/2 secretions. No fever. Cough noted, especially after she had some water. Was told she likely has JACEY, noted to be desaturating to 70s overnight at [facility] on personal monitor, awaiting CPAP evaluation. Toileting well, has had high volume of urine s/p Lasix. Developing some back pain 2/2 hospital bed.     In ED, patient was evaluated by ENT with laryngoscopy, found to have edema of the arytenoids, started on angioedema cocktail - dexamethasone, diphenhydramine, famotidine   (01 Jan 2024 01:41)    Hospital Course:  ENT consulted and pt found to have edematous arytenoids. Started on IV dexamethasone, diphenhydramine, famotidine regimen with improvement in symptoms; medications dc'd on 1/3. Cleared for diet. Also seen by pulmonary team. Pt started on methylprednisolone taper beginning at 32mg. Celecoxib dc'd as it was the only new medication prior to this episode of angioedema. Infectious workup negative. Complement studies sent. Home medications restarted.      Important Medication Changes and Reason:  -methylprednisolone 32mg daily x5 days (1/4-1/8)  methylprednisolone 28mg daily x5 days (1/9-1/13)  methylprednisolone 24mg daily x5 days (1/14-1/18)  methylprednisolone 20mg daily x5 days (1/19-1/23)  methylprednisolone 16mg daily x5 days (1/24-1/28)  methylprednisolone 12mg daily x5 days (1/29-2/2)  methylprednisolone 8mg daily x5 days (2/3-2/7)  methylprednisolone 4mg daily x5 days (2/8-2/12)    -d/c celecoxib- pt's only new medication prior to this episode of angioedema  -tramadol 50mg q6hr prn x7d    Active or Pending Issues Requiring Follow-up:  -pulm appt (Dr. Myles) 1/4/24 at 1:45  -allergy clinic information provided  -PSG for JACEY    Advanced Directives:   [x ] Full code  [ ] DNR  [ ] Hospice    Discharge Diagnoses:  #Angioedema       Ms. Hager is a  62 YO woman with PMH of pAfib, sciatica, anxiety, ILD, admission 9/13-10/5 (transfer from [facility] to [facility]) for SOB, during which she required ECMO, plasmapheresis/rituximab, high-dose steroids, ccb platelet transfusions, dysphagia, RIJ DVT, discharged on steroid taper, recently discharged 12/20 from [facility], who presents with worsening shortness of breath, cough and difficulty tolerating secretions x1-2 days. She initially wento [facility] where she recieved IV lasix and a dose of DuoNeb with improvement of symptoms, preferred to come to [facility] for admission. RVP negative at [facility], leukocytosis of 14. CT chest unchanged. She was unable to take her steroids today 2/2 secretions. No fever. Cough noted, especially after she had some water. Was told she likely has JACEY, noted to be desaturating to 70s overnight at [facility] on personal monitor, awaiting CPAP evaluation. Toileting well, has had high volume of urine s/p Lasix. Developing some back pain 2/2 hospital bed.     In ED, patient was evaluated by ENT with laryngoscopy, found to have edema of the arytenoids, started on angioedema cocktail - dexamethasone, diphenhydramine, famotidine   (01 Jan 2024 01:41)    Hospital Course:  ENT consulted and pt found to have edematous arytenoids. Started on IV dexamethasone, diphenhydramine, famotidine regimen with improvement in symptoms; medications dc'd on 1/3. Cleared for diet. Also seen by pulmonary team. Pt started on methylprednisolone taper beginning at 32mg. Celecoxib dc'd as it was the only new medication prior to this episode of angioedema. Infectious workup negative. Complement studies sent. Home medications restarted.      Important Medication Changes and Reason:  -methylprednisolone 32mg daily x5 days (1/4-1/8)  methylprednisolone 28mg daily x5 days (1/9-1/13)  methylprednisolone 24mg daily x5 days (1/14-1/18)  methylprednisolone 20mg daily x5 days (1/19-1/23)  methylprednisolone 16mg daily x5 days (1/24-1/28)  methylprednisolone 12mg daily x5 days (1/29-2/2)  methylprednisolone 8mg daily x5 days (2/3-2/7)  methylprednisolone 4mg daily x5 days (2/8-2/12)    -d/c celecoxib- pt's only new medication prior to this episode of angioedema  -tramadol 50mg q6hr prn x7d    Active or Pending Issues Requiring Follow-up:  -pulm appt (Dr. Myles) 1/4/24 at 1:45  -allergy clinic information provided  -PSG for JACEY    Advanced Directives:   [x ] Full code  [ ] DNR  [ ] Hospice    Discharge Diagnoses:  #Angioedema       Ms. Hager is a  62 YO woman with PMH of pAfib, sciatica, anxiety, ILD, admission 9/13-10/5 (transfer from [facility] to [facility]) for SOB, during which she required ECMO, plasmapheresis/rituximab, high-dose steroids, ccb platelet transfusions, dysphagia, RIJ DVT, discharged on steroid taper, recently discharged 12/20 from [facility], who presents with worsening shortness of breath, cough and difficulty tolerating secretions x1-2 days. She initially wento [facility] where she recieved IV lasix and a dose of DuoNeb with improvement of symptoms, preferred to come to [facility] for admission. RVP negative at [facility], leukocytosis of 14. CT chest unchanged. She was unable to take her steroids today 2/2 secretions. No fever. Cough noted, especially after she had some water. Was told she likely has JACEY, noted to be desaturating to 70s overnight at [facility] on personal monitor, awaiting CPAP evaluation. Toileting well, has had high volume of urine s/p Lasix. Developing some back pain 2/2 hospital bed.     In ED, patient was evaluated by ENT with laryngoscopy, found to have edema of the arytenoids, started on angioedema cocktail - dexamethasone, diphenhydramine, famotidine   (01 Jan 2024 01:41)    Hospital Course:  ENT consulted and pt found to have edematous arytenoids. Started on IV dexamethasone, diphenhydramine, famotidine regimen with improvement in symptoms; medications dc'd on 1/3. Cleared for diet. Also seen by pulmonary team. Pt started on methylprednisolone taper beginning at 32mg. Celecoxib dc'd as it was the only new medication prior to this episode of angioedema. Infectious workup negative. Complement studies sent. Home medications restarted.      Important Medication Changes and Reason:  -methylprednisolone 32mg daily x5 days (1/4-1/8)  methylprednisolone 28mg daily x5 days (1/9-1/13)  methylprednisolone 24mg daily x5 days (1/14-1/18)  methylprednisolone 20mg daily x5 days (1/19-1/23)  methylprednisolone 16mg daily x5 days (1/24-1/28)  methylprednisolone 12mg daily x5 days (1/29-2/2)  methylprednisolone 8mg daily x5 days (2/3-2/7)  methylprednisolone 4mg daily x5 days (2/8-2/12)    -d/c celecoxib- pt's only new medication prior to this episode of angioedema  -tramadol 50mg q6hr prn x7d    Active or Pending Issues Requiring Follow-up:  -pulm appt (Dr. Myles) 1/4/24 at 1:45  -allergy clinic information provided  -PSG for JACEY    Advanced Directives:   [x ] Full code  [ ] DNR  [ ] Hospice    Discharge Diagnoses:  #Angioedema

## 2024-01-01 NOTE — H&P ADULT - PROBLEM SELECTOR PLAN 1
ENT scope with arytenoid edema; plan to reassess 6 hours   - Angioedema cocktail  -- dexamethasone loading dose given, now continue on dexamethasone 10 q8 (had been on a steroid taper currently on solumedrol 20 at home, so this would represent an increase)  -- diphenhydramine 50 mg IV q12hr  -- famotidine 30 mg IV q12hr  - ESR/CRP elevated, procal negative, RVP negative  - SLP evaluation  - Allergy referral on discharge  - Strep pending ENT scope with arytenoid edema; plan to reassess 6 hours   - Angioedema cocktail  -- dexamethasone loading dose given, now continue on dexamethasone 10 q8 (had been on a steroid taper currently on solumedrol 20 at home, so this would represent an increase)  -- diphenhydramine 50 mg IV q12hr  -- famotidine 30 mg IV q12hr  - ESR/CRP elevated, procal negative, RVP negative  - SLP evaluation  - Allergy referral on discharge  - Strep pending  - Consider discussion with pulm whether appropriate for transfer to [facility] unclear etiology   reports had not been taking celecoxib and then restarted taking on 12/27, no history of allergies/eczema/asthma  ENT scope with arytenoid edema; plan to reassess 6 hours   - Angioedema cocktail  -- dexamethasone loading dose given, now continue on dexamethasone 10 q8 (had been on a steroid taper currently on solumedrol 20 at home, so this would represent an increase)  -- diphenhydramine 50 mg IV q12hr  -- famotidine 30 mg IV q12hr  - ESR/CRP elevated, procal negative, RVP negative  - SLP evaluation  - Allergy referral on discharge  - Strep pending, f/u  - Consider discussion with pulm whether appropriate for transfer to [facility] in AM

## 2024-01-01 NOTE — PROGRESS NOTE ADULT - PROBLEM SELECTOR PLAN 1
ENT scope with arytenoid edema; plan to reassess 6 hours   - Angioedema cocktail  -- dexamethasone loading dose given, now continue on dexamethasone 10 q8 (had been on a steroid taper currently on solumedrol 20 at home, so this would represent an increase)  -- diphenhydramine 50 mg IV q12hr  -- famotidine 30 mg IV q12hr  - ESR/CRP elevated, procal negative, RVP negative  - SLP evaluation  - Allergy referral on discharge  - Strep pending  - Consider discussion with pulm whether appropriate for transfer to [facility] ENT scope with arytenoid edema; plan to reassess 6 hours   - Angioedema cocktail  -- dexamethasone loading dose given, now continue on dexamethasone 10 q8 (had been on a steroid taper currently on solumedrol 20 at home, so this would represent an increase)  -- diphenhydramine 50 mg IV q12hr  -- famotidine 30 mg IV q12hr-> per pharmacy max daily dose is 20mg   - ESR/CRP elevated, procal negative, RVP negative  - SLP evaluation pending  - Allergy referral on discharge  - Strep pending  - Consider discussion with pulm whether appropriate for transfer to [facility]

## 2024-01-01 NOTE — PROGRESS NOTE ADULT - PROBLEM SELECTOR PLAN 4
10.7 -  stable from prior presentation. Mildly microcytic  - CTM CBC  - B12, iron, folate 10.7 -  stable from prior presentation. Mildly microcytic  - CTM CBC  - B12 732, total iron 27, TIBC 345, % sat 8, folate 20

## 2024-01-01 NOTE — H&P ADULT - PROBLEM SELECTOR PLAN 7
Diet: NPO pending ENT/S&S clearance  DVT: Lovenox as above  Dispo: Pending PT, medical course Diet: NPO pending ENT/S&S clearance, aspiration precautions  DVT: Lovenox as above  Dispo: Pending PT, medical course

## 2024-01-01 NOTE — DISCHARGE NOTE PROVIDER - CARE PROVIDER_API CALL
Chacho Long  Internal Medicine  [address]  [place] [address]  Phone: [phone]  Fax: [phone]  Established Patient  Follow Up Time: 2 weeks   Chacho Long  Internal Medicine  [address]  [place] [address]  Phone: [phone]  Fax: [phone]  Established Patient  Follow Up Time: 2 weeks

## 2024-01-01 NOTE — PROGRESS NOTE ADULT - ASSESSMENT
Ms. Hager is a  62 YO woman with PMH of pAfib, sciatica, anxiety, ILD, admission 10/25/23-12/20/23 (transfer from [facility] to [facility]) for SOB, during which she required ECMO, plasmapheresis/rituximab, high-dose steroids, ccb platelet transfusions, dysphagia, RIJ DVT, discharged on steroid taper who [facility] with worsening shortness of breath, cough and difficulty tolerating secretions x1-2 days, found to have laryngeal edema.  Ms. Hager is a  64 YO woman with PMH of pAfib, sciatica, anxiety, ILD, admission 10/25/23-12/20/23 (transfer from [facility] to [facility]) for SOB, during which she required ECMO, plasmapheresis/rituximab, high-dose steroids, ccb platelet transfusions, dysphagia, RIJ DVT, discharged on steroid taper who [facility] with worsening shortness of breath, cough and difficulty tolerating secretions x1-2 days, found to have laryngeal edema.

## 2024-01-01 NOTE — PROGRESS NOTE ADULT - SUBJECTIVE AND OBJECTIVE BOX
OTOLARYNGOLOGY (ENT) PROGRESS NOTE    PATIENT: ALIZA FOLEY  MRN: [id number]  : 56  [id number]  DATE OF SERVICE:  24  			         ID:ALIZA FOLEY is a  63yFemale with *** now POD *** s/p ***    Subjective/ Interval:     ALLERGIES:  No Known Allergies      MEDICATIONS:  Antiinfectives:   atovaquone  Suspension 1500 milliGRAM(s) Oral daily    IV fluids:    Hematologic/Anticoagulation:  enoxaparin Injectable 120 milliGRAM(s) SubCutaneous every 12 hours    Pain medications/Neuro:  ALPRAZolam 0.25 milliGRAM(s) Oral every 6 hours PRN  baclofen 5 milliGRAM(s) Oral every 8 hours  celecoxib 100 milliGRAM(s) Oral two times a day  gabapentin 300 milliGRAM(s) Oral three times a day    Endocrine Medications:   dexAMETHasone  IVPB 10 milliGRAM(s) IV Intermittent three times a day    All other standing medications:   diphenhydrAMINE Injectable 50 milliGRAM(s) IV Push every 12 hours  famotidine Injectable 20 milliGRAM(s) IV Push every 12 hours  ipratropium 17 MICROgram(s) HFA Inhaler 2 Puff(s) Inhalation <User Schedule>  metoprolol tartrate 12.5 milliGRAM(s) Oral every 12 hours  nystatin Powder 1 Application(s) Topical two times a day  pantoprazole  Injectable 40 milliGRAM(s) IV Push daily  polyethylene glycol 3350 17 Gram(s) Oral daily  psyllium Powder 1 Packet(s) Oral daily  senna 2 Tablet(s) Oral at bedtime  zinc oxide 40% Paste 1 Application(s) Topical two times a day    All other PRN medications:  ammonium lactate 12% Lotion 1 Application(s) Topical every 12 hours PRN  artificial tears (preservative free) Ophthalmic Solution 1 Drop(s) Both EYES every 12 hours PRN  lidocaine   4% Patch 1 Patch Transdermal daily PRN    Vital Signs Last 24 Hrs  T(C): 36.9 (2024 18:14), Max: 37.1 (2024 14:20)  T(F): 98.4 (2024 18:14), Max: 98.7 (2024 14:20)  HR: 93 (2024 18:14) (70 - 93)  BP: 131/39 (2024 18:14) (106/70 - 131/39)  BP(mean): --  RR: 16 (2024 18:14) (16 - 20)  SpO2: 95% (2024 18:14) (95% - 100%)    Parameters below as of 2024 18:14  Patient On (Oxygen Delivery Method): room air                  PHYSICAL EXAM:  GEN: appears stated age, NAD  NEURO: alert & oriented x   HEENT: face symmetric, oropharynx moist without exudates, CN VII/XI/XII intact  CVS: regular rate and rhythm  Pulm: normal respiratory excursions, not tachypneic, no labored breathing  Abd: non-distended  Ext: moving all four extremities, no peripheral edema noted       LABS                       10.2   9.23  )-----------( 298      ( 2024 07:35 )             33.6    12-31    141  |  102  |  16  ----------------------------<  107<H>  3.5   |  30  |  0.58    Ca    9.7      31 Dec 2023 03:00  Phos  3.5     12-31  Mg     1.7     12-31    TPro  6.1  /  Alb  3.2<L>  /  TBili  1.0  /  DBili  x   /  AST  22  /  ALT  47<H>  /  AlkPhos  120  12-31         Coagulation Studies-     Urinalysis Basic - ( 31 Dec 2023 03:00 )    Color: x / Appearance: x / SG: x / pH: x  Gluc: 107 mg/dL / Ketone: x  / Bili: x / Urobili: x   Blood: x / Protein: x / Nitrite: x   Leuk Esterase: x / RBC: x / WBC x   Sq Epi: x / Non Sq Epi: x / Bacteria: x      Endocrine Panel-                MICROBIOLOGY:        RADIOLOGY & ADDITIONAL STUDIES:    Assessment and Plan:  ALIZA FOLEY is a  63yFemale   .. S/P ...    PLAN:    Disposition:     Page ENT with any questions/concerns.  Peds Page #[phone]  Adult Page #[phone]    Nancy Sultana  24 @ 19:16 OTOLARYNGOLOGY (ENT) PROGRESS NOTE    PATIENT: ALIZA FOLEY  MRN: [id number]  : 56  [id number]  DATE OF SERVICE:  24  			         ID:ALIZA FOLEY is a  63yFemale with *** now POD *** s/p ***    Subjective/ Interval:     ALLERGIES:  No Known Allergies      MEDICATIONS:  Antiinfectives:   atovaquone  Suspension 1500 milliGRAM(s) Oral daily    IV fluids:    Hematologic/Anticoagulation:  enoxaparin Injectable 120 milliGRAM(s) SubCutaneous every 12 hours    Pain medications/Neuro:  ALPRAZolam 0.25 milliGRAM(s) Oral every 6 hours PRN  baclofen 5 milliGRAM(s) Oral every 8 hours  celecoxib 100 milliGRAM(s) Oral two times a day  gabapentin 300 milliGRAM(s) Oral three times a day    Endocrine Medications:   dexAMETHasone  IVPB 10 milliGRAM(s) IV Intermittent three times a day    All other standing medications:   diphenhydrAMINE Injectable 50 milliGRAM(s) IV Push every 12 hours  famotidine Injectable 20 milliGRAM(s) IV Push every 12 hours  ipratropium 17 MICROgram(s) HFA Inhaler 2 Puff(s) Inhalation <User Schedule>  metoprolol tartrate 12.5 milliGRAM(s) Oral every 12 hours  nystatin Powder 1 Application(s) Topical two times a day  pantoprazole  Injectable 40 milliGRAM(s) IV Push daily  polyethylene glycol 3350 17 Gram(s) Oral daily  psyllium Powder 1 Packet(s) Oral daily  senna 2 Tablet(s) Oral at bedtime  zinc oxide 40% Paste 1 Application(s) Topical two times a day    All other PRN medications:  ammonium lactate 12% Lotion 1 Application(s) Topical every 12 hours PRN  artificial tears (preservative free) Ophthalmic Solution 1 Drop(s) Both EYES every 12 hours PRN  lidocaine   4% Patch 1 Patch Transdermal daily PRN    Vital Signs Last 24 Hrs  T(C): 36.9 (2024 18:14), Max: 37.1 (2024 14:20)  T(F): 98.4 (2024 18:14), Max: 98.7 (2024 14:20)  HR: 93 (2024 18:14) (70 - 93)  BP: 131/39 (2024 18:14) (106/70 - 131/39)  BP(mean): --  RR: 16 (2024 18:14) (16 - 20)  SpO2: 95% (2024 18:14) (95% - 100%)    Parameters below as of 2024 18:14  Patient On (Oxygen Delivery Method): room air                  PHYSICAL EXAM:  GEN: appears stated age, NAD  NEURO: alert & oriented x   HEENT: face symmetric, oropharynx moist without exudates, CN VII/XI/XII intact  CVS: regular rate and rhythm  Pulm: normal respiratory excursions, not tachypneic, no labored breathing  Abd: non-distended  Ext: moving all four extremities, no peripheral edema noted       LABS                       10.2   9.23  )-----------( 298      ( 2024 07:35 )             33.6    12-31    141  |  102  |  16  ----------------------------<  107<H>  3.5   |  30  |  0.58    Ca    9.7      31 Dec 2023 03:00  Phos  3.5     12-31  Mg     1.7     12-31    TPro  6.1  /  Alb  3.2<L>  /  TBili  1.0  /  DBili  x   /  AST  22  /  ALT  47<H>  /  AlkPhos  120  12-31         Coagulation Studies-     Urinalysis Basic - ( 31 Dec 2023 03:00 )    Color: x / Appearance: x / SG: x / pH: x  Gluc: 107 mg/dL / Ketone: x  / Bili: x / Urobili: x   Blood: x / Protein: x / Nitrite: x   Leuk Esterase: x / RBC: x / WBC x   Sq Epi: x / Non Sq Epi: x / Bacteria: x      Endocrine Panel-                MICROBIOLOGY:        RADIOLOGY & ADDITIONAL STUDIES:    Assessment and Plan:  ALIZA FOLEY is a  63yFemale   .. S/P ...    PLAN:    Disposition:     Page ENT with any questions/concerns.  Peds Page #[phone]  Adult Page #[phone]    Nancy Sultana  24 @ 19:16 OTOLARYNGOLOGY (ENT) PROGRESS NOTE    PATIENT: ALIZA FOLEY  MRN: [id number]  : 56  [id number]  DATE OF SERVICE:  24  			           Subjective/ Interval: Patient seen and examined at bedside this morning. She reports significant improvement in difficulty swallowing. She denies ever having difficulty breathing and continues not to have difficulty breathing.    ALLERGIES:  No Known Allergies      MEDICATIONS:  Antiinfectives:   atovaquone  Suspension 1500 milliGRAM(s) Oral daily    IV fluids:    Hematologic/Anticoagulation:  enoxaparin Injectable 120 milliGRAM(s) SubCutaneous every 12 hours    Pain medications/Neuro:  ALPRAZolam 0.25 milliGRAM(s) Oral every 6 hours PRN  baclofen 5 milliGRAM(s) Oral every 8 hours  celecoxib 100 milliGRAM(s) Oral two times a day  gabapentin 300 milliGRAM(s) Oral three times a day    Endocrine Medications:   dexAMETHasone  IVPB 10 milliGRAM(s) IV Intermittent three times a day    All other standing medications:   diphenhydrAMINE Injectable 50 milliGRAM(s) IV Push every 12 hours  famotidine Injectable 20 milliGRAM(s) IV Push every 12 hours  ipratropium 17 MICROgram(s) HFA Inhaler 2 Puff(s) Inhalation <User Schedule>  metoprolol tartrate 12.5 milliGRAM(s) Oral every 12 hours  nystatin Powder 1 Application(s) Topical two times a day  pantoprazole  Injectable 40 milliGRAM(s) IV Push daily  polyethylene glycol 3350 17 Gram(s) Oral daily  psyllium Powder 1 Packet(s) Oral daily  senna 2 Tablet(s) Oral at bedtime  zinc oxide 40% Paste 1 Application(s) Topical two times a day    All other PRN medications:  ammonium lactate 12% Lotion 1 Application(s) Topical every 12 hours PRN  artificial tears (preservative free) Ophthalmic Solution 1 Drop(s) Both EYES every 12 hours PRN  lidocaine   4% Patch 1 Patch Transdermal daily PRN    Vital Signs Last 24 Hrs  T(C): 36.9 (2024 18:14), Max: 37.1 (2024 14:20)  T(F): 98.4 (2024 18:14), Max: 98.7 (2024 14:20)  HR: 93 (2024 18:14) (70 - 93)  BP: 131/39 (2024 18:14) (106/70 - 131/39)  BP(mean): --  RR: 16 (2024 18:14) (16 - 20)  SpO2: 95% (2024 18:14) (95% - 100%)    Parameters below as of 2024 18:14  Patient On (Oxygen Delivery Method): room air                 LABS                       10.2   9.23  )-----------( 298      ( 2024 07:35 )             33.6    12-31    141  |  102  |  16  ----------------------------<  107<H>  3.5   |  30  |  0.58    Ca    9.7      31 Dec 2023 03:00  Phos  3.5     12-  Mg     1.7     12-    TPro  6.1  /  Alb  3.2<L>  /  TBili  1.0  /  DBili  x   /  AST  22  /  ALT  47<H>  /  AlkPhos  120  12-31         Coagulation Studies-     Urinalysis Basic - ( 31 Dec 2023 03:00 )    Color: x / Appearance: x / SG: x / pH: x  Gluc: 107 mg/dL / Ketone: x  / Bili: x / Urobili: x   Blood: x / Protein: x / Nitrite: x   Leuk Esterase: x / RBC: x / WBC x   Sq Epi: x / Non Sq Epi: x / Bacteria: x      Endocrine Panel-                MICROBIOLOGY:        RADIOLOGY & ADDITIONAL STUDIES:      Physical Exam:  NAD, laying in bed. Awake, alert.  Breathing comfortably on room air. No stridor, stertor.  Face: symmetric without masses or lesions.  OU: PERRL, EOMI  NC: clear anteriorly. Mucosa normal without crusting, bleeding.  OC/OP: clear, wnl. No masses, lesions.  Neck: soft, flat, and supple. No palpable collection, induration, or crepitus noted.  CNII-XII intact    Procedure: Flexible laryngoscopy    Description:    A flexible endoscope was used to examine the left nasal cavity, posterior oropharynx, hypopharynx, and larynx. The nasal valve areas were examined for abnormalities or collapse. The inferior and middle turbinates were evaluated. The middle and superior meatuses, the sphenoethmoid recesses, and the nasopharynx were examined and inspected for mucopurulence, polyps, and nasal masses. The oropharynx, tongue base/vallecula, epiglottis, aryepiglottic folds, arytenoids, vocal cords, hypopharynx were also inspected for swelling, inflammation, or dysfunction. The patient tolerated the procedure without complications.    Findings:  Left nasal cavity patent, no inferior turb hypertrophy, no masses/lesions, no discharge  NP wnl  BOT/vallecula normal  Epiglottis sharp  Arytenoids edematous, less so than previously  Vocal cords mobile, full motion difficult to assess  No masses or lesions visualized in post cricoid space or pyriform sinuses bilaterally  Airway patent    Assessment and Plan:  Patient is a 63y Female w/ PMHx interstitial lung disease presents w/ likely angioedema given arytenoid edema on scope exam. No change in baseline respiratory status, saturating well on room air. Airway patent at this time. Laryngoscopy demonstrating mild decrease in edema.    -angioedema cocktail:  -continuous pulse ox  -allergy referral upon discharge  -no plan for further laryngoscopy unless clinical presentation changes  -please page ENT with any signs of respiratory distress, oxygen desaturations  -Rest of care per primary team      PLAN:    Disposition:     Page ENT with any questions/concerns.  Peds Page #[phone]  Adult Page #[phone]    Nancy Sultana  24 @ 19:16 OTOLARYNGOLOGY (ENT) PROGRESS NOTE    PATIENT: ALIZA FOLEY  MRN: [id number]  : 56  [id number]  DATE OF SERVICE:  24  			           Subjective/ Interval: Patient seen and examined at bedside this morning. She reports significant improvement in difficulty swallowing. She denies ever having difficulty breathing and continues not to have difficulty breathing.    ALLERGIES:  No Known Allergies      MEDICATIONS:  Antiinfectives:   atovaquone  Suspension 1500 milliGRAM(s) Oral daily    IV fluids:    Hematologic/Anticoagulation:  enoxaparin Injectable 120 milliGRAM(s) SubCutaneous every 12 hours    Pain medications/Neuro:  ALPRAZolam 0.25 milliGRAM(s) Oral every 6 hours PRN  baclofen 5 milliGRAM(s) Oral every 8 hours  celecoxib 100 milliGRAM(s) Oral two times a day  gabapentin 300 milliGRAM(s) Oral three times a day    Endocrine Medications:   dexAMETHasone  IVPB 10 milliGRAM(s) IV Intermittent three times a day    All other standing medications:   diphenhydrAMINE Injectable 50 milliGRAM(s) IV Push every 12 hours  famotidine Injectable 20 milliGRAM(s) IV Push every 12 hours  ipratropium 17 MICROgram(s) HFA Inhaler 2 Puff(s) Inhalation <User Schedule>  metoprolol tartrate 12.5 milliGRAM(s) Oral every 12 hours  nystatin Powder 1 Application(s) Topical two times a day  pantoprazole  Injectable 40 milliGRAM(s) IV Push daily  polyethylene glycol 3350 17 Gram(s) Oral daily  psyllium Powder 1 Packet(s) Oral daily  senna 2 Tablet(s) Oral at bedtime  zinc oxide 40% Paste 1 Application(s) Topical two times a day    All other PRN medications:  ammonium lactate 12% Lotion 1 Application(s) Topical every 12 hours PRN  artificial tears (preservative free) Ophthalmic Solution 1 Drop(s) Both EYES every 12 hours PRN  lidocaine   4% Patch 1 Patch Transdermal daily PRN    Vital Signs Last 24 Hrs  T(C): 36.9 (2024 18:14), Max: 37.1 (2024 14:20)  T(F): 98.4 (2024 18:14), Max: 98.7 (2024 14:20)  HR: 93 (2024 18:14) (70 - 93)  BP: 131/39 (2024 18:14) (106/70 - 131/39)  BP(mean): --  RR: 16 (2024 18:14) (16 - 20)  SpO2: 95% (2024 18:14) (95% - 100%)    Parameters below as of 2024 18:14  Patient On (Oxygen Delivery Method): room air                 LABS                       10.2   9.23  )-----------( 298      ( 2024 07:35 )             33.6    12-31    141  |  102  |  16  ----------------------------<  107<H>  3.5   |  30  |  0.58    Ca    9.7      31 Dec 2023 03:00  Phos  3.5     12-  Mg     1.7     12-    TPro  6.1  /  Alb  3.2<L>  /  TBili  1.0  /  DBili  x   /  AST  22  /  ALT  47<H>  /  AlkPhos  120  12-31         Coagulation Studies-     Urinalysis Basic - ( 31 Dec 2023 03:00 )    Color: x / Appearance: x / SG: x / pH: x  Gluc: 107 mg/dL / Ketone: x  / Bili: x / Urobili: x   Blood: x / Protein: x / Nitrite: x   Leuk Esterase: x / RBC: x / WBC x   Sq Epi: x / Non Sq Epi: x / Bacteria: x      Endocrine Panel-                MICROBIOLOGY:        RADIOLOGY & ADDITIONAL STUDIES:      Physical Exam:  NAD, laying in bed. Awake, alert.  Breathing comfortably on room air. No stridor, stertor.  Face: symmetric without masses or lesions.  OU: PERRL, EOMI  NC: clear anteriorly. Mucosa normal without crusting, bleeding.  OC/OP: clear, wnl. No masses, lesions.  Neck: soft, flat, and supple. No palpable collection, induration, or crepitus noted.  CNII-XII intact    Procedure: Flexible laryngoscopy    Description:    A flexible endoscope was used to examine the left nasal cavity, posterior oropharynx, hypopharynx, and larynx. The nasal valve areas were examined for abnormalities or collapse. The inferior and middle turbinates were evaluated. The middle and superior meatuses, the sphenoethmoid recesses, and the nasopharynx were examined and inspected for mucopurulence, polyps, and nasal masses. The oropharynx, tongue base/vallecula, epiglottis, aryepiglottic folds, arytenoids, vocal cords, hypopharynx were also inspected for swelling, inflammation, or dysfunction. The patient tolerated the procedure without complications.    Findings:  Left nasal cavity patent, no inferior turb hypertrophy, no masses/lesions, no discharge  NP wnl  BOT/vallecula normal  Epiglottis sharp  Arytenoids edematous, less so than previously  Vocal cords mobile, full motion difficult to assess  No masses or lesions visualized in post cricoid space or pyriform sinuses bilaterally  Airway patent    Assessment and Plan:  Patient is a 63y Female w/ PMHx interstitial lung disease presents w/ likely angioedema given arytenoid edema on scope exam. No change in baseline respiratory status, saturating well on room air. Airway patent at this time. Laryngoscopy demonstrating mild decrease in edema.    -angioedema cocktail:  -continuous pulse ox  -allergy referral upon discharge  -no plan for further laryngoscopy unless clinical presentation changes  -please page ENT with any signs of respiratory distress, oxygen desaturations  -Rest of care per primary team      PLAN:    Disposition:     Page ENT with any questions/concerns.  Peds Page #[phone]  Adult Page #[phone]    Nancy Sultana  24 @ 19:16

## 2024-01-01 NOTE — H&P ADULT - PROBLEM SELECTOR PLAN 3
Crackles in lungs, CT with ILD  - Steroids as above  - Pulm recs appreciated  - Notes she desats during sleep for which she was started on 1L NC but she feels it does not help as she mainly breathes through her mouth. Started on venturi mask Crackles in lungs, CT with ILD  - Steroids as above  - Pulm recs appreciated  - Notes she desats during sleep for which she was started on 1L NC but she feels it does not help as she mainly breathes through her mouth. Started on venturi mask  - Noted to be on atovaquone prophylaxis as outpatient, will follow up S&S eval - if tolerates will restart, if not will need alternative

## 2024-01-01 NOTE — PROGRESS NOTE ADULT - SUBJECTIVE AND OBJECTIVE BOX
PROGRESS NOTE:     Patient is a 63y old  Female who presents with a chief complaint of Inability to tolerate secretions (01 Jan 2024 01:41)      SUBJECTIVE / OVERNIGHT EVENTS:    ADDITIONAL REVIEW OF SYSTEMS: 10 point ROS negative except per HPI    MEDICATIONS  (STANDING):  dexAMETHasone  IVPB 10 milliGRAM(s) IV Intermittent three times a day  diphenhydrAMINE Injectable 50 milliGRAM(s) IV Push every 12 hours  enoxaparin Injectable 120 milliGRAM(s) SubCutaneous every 12 hours  famotidine Injectable 30 milliGRAM(s) IV Push every 12 hours  ipratropium 17 MICROgram(s) HFA Inhaler 2 Puff(s) Inhalation <User Schedule>  nystatin Powder 1 Application(s) Topical two times a day  pantoprazole  Injectable 40 milliGRAM(s) IV Push daily  zinc oxide 40% Paste 1 Application(s) Topical two times a day    MEDICATIONS  (PRN):  ammonium lactate 12% Lotion 1 Application(s) Topical every 12 hours PRN BL feet dryness  artificial tears (preservative free) Ophthalmic Solution 1 Drop(s) Both EYES every 12 hours PRN Dry Eyes  lidocaine   4% Patch 1 Patch Transdermal daily PRN back pain      CAPILLARY BLOOD GLUCOSE        I&O's Summary      PHYSICAL EXAM:  Vital Signs Last 24 Hrs  T(C): 36.5 (01 Jan 2024 07:29), Max: 37.4 (31 Dec 2023 10:59)  T(F): 97.7 (01 Jan 2024 07:29), Max: 99.3 (31 Dec 2023 10:59)  HR: 86 (01 Jan 2024 07:29) (70 - 99)  BP: 106/70 (01 Jan 2024 07:29) (106/70 - 127/70)  BP(mean): --  RR: 18 (01 Jan 2024 07:29) (18 - 28)  SpO2: 95% (01 Jan 2024 07:29) (94% - 100%)    Parameters below as of 01 Jan 2024 07:29  Patient On (Oxygen Delivery Method): room air        CONSTITUTIONAL: NAD, well-developed, A&Ox3 to person, place, time.  RESPIRATORY: Normal respiratory effort; lungs are clear to auscultation bilaterally  CARDIOVASCULAR: Regular rate and rhythm, normal S1 and S2, no murmur/rub/gallop; No lower extremity edema; Peripheral pulses are 2+ bilaterally  ABDOMEN: Nontender to palpation, no rebound/guarding; No hepatosplenomegaly  MUSCLOSKELETAL: no clubbing or cyanosis of digits; no joint swelling or tenderness to palpation  NEURO: CN 2-12 grossly intact, moves all limbs spontaneously      LABS:                          10.2   9.23  )-----------( 298      ( 01 Jan 2024 07:35 )             33.6     12-31    141  |  102  |  16  ----------------------------<  107<H>  3.5   |  30  |  0.58    Ca    9.7      31 Dec 2023 03:00  Phos  3.5     12-31  Mg     1.7     12-31    TPro  6.1  /  Alb  3.2<L>  /  TBili  1.0  /  DBili  x   /  AST  22  /  ALT  47<H>  /  AlkPhos  120  12-31          -----    MICRO  Urinalysis Basic - ( 31 Dec 2023 03:00 )    Color: x / Appearance: x / SG: x / pH: x  Gluc: 107 mg/dL / Ketone: x  / Bili: x / Urobili: x   Blood: x / Protein: x / Nitrite: x   Leuk Esterase: x / RBC: x / WBC x   Sq Epi: x / Non Sq Epi: x / Bacteria: x        -----    TRENDS  Hemoglobin: 10.2 g/dL (01-01 @ 07:35)  Hemoglobin: 10.7 g/dL (12-31 @ 03:00)    Creatinine Trend: 0.58<--, 0.65<--, 0.56<--, 0.80<--, 0.70<--, 0.81<--  ----  RADIOLOGY & ADDITIONAL TESTS:  Results Reviewed:   Imaging Personally Reviewed:  Electrocardiogram Personally Reviewed:    COORDINATION OF CARE:  Care Discussed with Consultants/Other Providers [Y/N]:  Prior or Outpatient Records Reviewed [Y/N]:   PROGRESS NOTE:     Patient is a 63y old  Female who presents with a chief complaint of Inability to tolerate secretions (01 Jan 2024 01:41)      SUBJECTIVE / OVERNIGHT EVENTS: No overnight events. Pt states breathing, secretions, swallowing feel improved this morning.    ADDITIONAL REVIEW OF SYSTEMS: 10 point ROS negative except per HPI    MEDICATIONS  (STANDING):  dexAMETHasone  IVPB 10 milliGRAM(s) IV Intermittent three times a day  diphenhydrAMINE Injectable 50 milliGRAM(s) IV Push every 12 hours  enoxaparin Injectable 120 milliGRAM(s) SubCutaneous every 12 hours  famotidine Injectable 30 milliGRAM(s) IV Push every 12 hours  ipratropium 17 MICROgram(s) HFA Inhaler 2 Puff(s) Inhalation <User Schedule>  nystatin Powder 1 Application(s) Topical two times a day  pantoprazole  Injectable 40 milliGRAM(s) IV Push daily  zinc oxide 40% Paste 1 Application(s) Topical two times a day    MEDICATIONS  (PRN):  ammonium lactate 12% Lotion 1 Application(s) Topical every 12 hours PRN BL feet dryness  artificial tears (preservative free) Ophthalmic Solution 1 Drop(s) Both EYES every 12 hours PRN Dry Eyes  lidocaine   4% Patch 1 Patch Transdermal daily PRN back pain      CAPILLARY BLOOD GLUCOSE        I&O's Summary      PHYSICAL EXAM:  Vital Signs Last 24 Hrs  T(C): 36.5 (01 Jan 2024 07:29), Max: 37.4 (31 Dec 2023 10:59)  T(F): 97.7 (01 Jan 2024 07:29), Max: 99.3 (31 Dec 2023 10:59)  HR: 86 (01 Jan 2024 07:29) (70 - 99)  BP: 106/70 (01 Jan 2024 07:29) (106/70 - 127/70)  BP(mean): --  RR: 18 (01 Jan 2024 07:29) (18 - 28)  SpO2: 95% (01 Jan 2024 07:29) (94% - 100%)    Parameters below as of 01 Jan 2024 07:29  Patient On (Oxygen Delivery Method): room air        CONSTITUTIONAL: NAD, well-developed, A&Ox3 to person, place, time.  RESPIRATORY: Normal respiratory effort; lungs are clear to auscultation bilaterally  CARDIOVASCULAR: Regular rate and rhythm, normal S1 and S2, no murmur/rub/gallop; No lower extremity edema; Peripheral pulses are 2+ bilaterally  ABDOMEN: Nontender to palpation, no rebound/guarding; No hepatosplenomegaly  MUSCLOSKELETAL: no clubbing or cyanosis of digits; no joint swelling or tenderness to palpation  NEURO: CN 2-12 grossly intact, moves all limbs spontaneously      LABS:                          10.2   9.23  )-----------( 298      ( 01 Jan 2024 07:35 )             33.6     12-31    141  |  102  |  16  ----------------------------<  107<H>  3.5   |  30  |  0.58    Ca    9.7      31 Dec 2023 03:00  Phos  3.5     12-31  Mg     1.7     12-31    TPro  6.1  /  Alb  3.2<L>  /  TBili  1.0  /  DBili  x   /  AST  22  /  ALT  47<H>  /  AlkPhos  120  12-31          -----    MICRO  Urinalysis Basic - ( 31 Dec 2023 03:00 )    Color: x / Appearance: x / SG: x / pH: x  Gluc: 107 mg/dL / Ketone: x  / Bili: x / Urobili: x   Blood: x / Protein: x / Nitrite: x   Leuk Esterase: x / RBC: x / WBC x   Sq Epi: x / Non Sq Epi: x / Bacteria: x        -----    TRENDS  Hemoglobin: 10.2 g/dL (01-01 @ 07:35)  Hemoglobin: 10.7 g/dL (12-31 @ 03:00)    Creatinine Trend: 0.58<--, 0.65<--, 0.56<--, 0.80<--, 0.70<--, 0.81<--  ----  RADIOLOGY & ADDITIONAL TESTS:  Results Reviewed:   Imaging Personally Reviewed:  Electrocardiogram Personally Reviewed:    COORDINATION OF CARE:  Care Discussed with Consultants/Other Providers [Y/N]:  Prior or Outpatient Records Reviewed [Y/N]:   PROGRESS NOTE:     Patient is a 63y old  Female who presents with a chief complaint of Inability to tolerate secretions (01 Jan 2024 01:41)      SUBJECTIVE / OVERNIGHT EVENTS: No overnight events. Pt states breathing, secretions, swallowing feel improved this morning.    ADDITIONAL REVIEW OF SYSTEMS: 10 point ROS negative except per HPI    MEDICATIONS  (STANDING):  dexAMETHasone  IVPB 10 milliGRAM(s) IV Intermittent three times a day  diphenhydrAMINE Injectable 50 milliGRAM(s) IV Push every 12 hours  enoxaparin Injectable 120 milliGRAM(s) SubCutaneous every 12 hours  famotidine Injectable 30 milliGRAM(s) IV Push every 12 hours  ipratropium 17 MICROgram(s) HFA Inhaler 2 Puff(s) Inhalation <User Schedule>  nystatin Powder 1 Application(s) Topical two times a day  pantoprazole  Injectable 40 milliGRAM(s) IV Push daily  zinc oxide 40% Paste 1 Application(s) Topical two times a day    MEDICATIONS  (PRN):  ammonium lactate 12% Lotion 1 Application(s) Topical every 12 hours PRN BL feet dryness  artificial tears (preservative free) Ophthalmic Solution 1 Drop(s) Both EYES every 12 hours PRN Dry Eyes  lidocaine   4% Patch 1 Patch Transdermal daily PRN back pain      CAPILLARY BLOOD GLUCOSE        I&O's Summary      PHYSICAL EXAM:  Vital Signs Last 24 Hrs  T(C): 36.5 (01 Jan 2024 07:29), Max: 37.4 (31 Dec 2023 10:59)  T(F): 97.7 (01 Jan 2024 07:29), Max: 99.3 (31 Dec 2023 10:59)  HR: 86 (01 Jan 2024 07:29) (70 - 99)  BP: 106/70 (01 Jan 2024 07:29) (106/70 - 127/70)  BP(mean): --  RR: 18 (01 Jan 2024 07:29) (18 - 28)  SpO2: 95% (01 Jan 2024 07:29) (94% - 100%)    Parameters below as of 01 Jan 2024 07:29  Patient On (Oxygen Delivery Method): room air        CONSTITUTIONAL: NAD, well-developed, A&Ox3 to person, place, time.  RESPIRATORY: Normal respiratory effort; lungs are clear to auscultation bilaterally; minimal upper airway wheeze, no stridor   CARDIOVASCULAR: Regular rate and rhythm, normal S1 and S2, no murmur/rub/gallop; No lower extremity edema; Peripheral pulses are 2+ bilaterally  ABDOMEN: Nontender to palpation, no rebound/guarding; No hepatosplenomegaly  MUSCLOSKELETAL: no clubbing or cyanosis of digits; no joint swelling or tenderness to palpation  NEURO: CN 2-12 grossly intact, moves all limbs spontaneously      LABS:                          10.2   9.23  )-----------( 298      ( 01 Jan 2024 07:35 )             33.6     12-31    141  |  102  |  16  ----------------------------<  107<H>  3.5   |  30  |  0.58    Ca    9.7      31 Dec 2023 03:00  Phos  3.5     12-31  Mg     1.7     12-31    TPro  6.1  /  Alb  3.2<L>  /  TBili  1.0  /  DBili  x   /  AST  22  /  ALT  47<H>  /  AlkPhos  120  12-31          -----    MICRO  Urinalysis Basic - ( 31 Dec 2023 03:00 )    Color: x / Appearance: x / SG: x / pH: x  Gluc: 107 mg/dL / Ketone: x  / Bili: x / Urobili: x   Blood: x / Protein: x / Nitrite: x   Leuk Esterase: x / RBC: x / WBC x   Sq Epi: x / Non Sq Epi: x / Bacteria: x        -----    TRENDS  Hemoglobin: 10.2 g/dL (01-01 @ 07:35)  Hemoglobin: 10.7 g/dL (12-31 @ 03:00)    Creatinine Trend: 0.58<--, 0.65<--, 0.56<--, 0.80<--, 0.70<--, 0.81<--  ----  RADIOLOGY & ADDITIONAL TESTS:  Results Reviewed:   Imaging Personally Reviewed:  Electrocardiogram Personally Reviewed:    COORDINATION OF CARE:  Care Discussed with Consultants/Other Providers [Y/N]:  Prior or Outpatient Records Reviewed [Y/N]:

## 2024-01-01 NOTE — H&P ADULT - NSHPREVIEWOFSYSTEMS_GEN_ALL_CORE
CONSTITUTIONAL: No fevers, chills, fatigue, dizziness, weakness, unexpected weight change  EYES: No double vision, blurry vision  ENT: +Sore throat, secretions, hoarseness  CV: No chest pain, palpitations  PULM: +cough, shortness of breath  GI: No abdominal pain, nausea, vomiting, diarrhea, constipation  : No dysuria, polyuria, hematuria  SKIN: No rashes, swelling  MSK: No muscle aches  NEURO: + headache, no paresthesias  PSYCHIATRIC: Denies suicidal, homicidal ideations. No auditory, visual, tactile hallucinations CONSTITUTIONAL: No fevers, chills, fatigue, dizziness, weakness, unexpected weight change; (+)decreased PO intake since 12/30  EYES: No double vision, blurry vision  ENT: +Sore throat, secretions, hoarseness, dysphagia (denies aspiration), (+)chronic sinus congestion; No tongue swelling; No neck swelling  CV: No chest pain, palpitations; (+)Chronic LE edema  PULM: +cough - productive of white/yellow sputum, shortness of breath; No wheezing  GI: No abdominal pain, nausea, vomiting, (+) diarrhea intermittently, recently w/constipation, last BM 12/29; No melena/hematochezia  : No dysuria, polyuria, hematuria  SKIN: No rashes, skin changes or wounds  MSK: No muscle aches; ambulates with walker, no falls  NEURO: + headache (denied on attending ROS), no paresthesias; (+)numbness parts of scalp s/p ECMO; (+)LH with standing intermittently  PSYCHIATRIC: Denies suicidal, homicidal ideations. No auditory, visual, tactile hallucinations

## 2024-01-01 NOTE — DISCHARGE NOTE PROVIDER - NSFOLLOWUPCLINICS_GEN_ALL_ED_FT
[facility] Allergy and Immunology  Allergy  [address] [place] [address]  Phone: [phone]  Fax:      [facility] Allergy and Immunology  Allergy  [address] [place] [address]  Phone: [phone]  Fax:      [facility] Allergy and Immunology  Allergy  [address] [place] [address]  Phone: [phone]  Fax:     [facility] Rheumatology  Rheumatology  [address] [address]  Phone: [phone]  Fax:      [facility] Allergy and Immunology  Allergy  [address] [place] [address]  Phone: [phone]  Fax:     [facility] Rheumatology  Rheumatology  [address] [address]  Phone: [phone]  Fax:

## 2024-01-01 NOTE — DISCHARGE NOTE PROVIDER - PROVIDER TOKENS
PROVIDER:[TOKEN:[[id number]],FOLLOWUP:[2 weeks],ESTABLISHEDPATIENT:[T]] PROVIDER:[TOKEN:[[id number]],FOLLOWUP:[2 weeks],ESTABLISHEDPATIENT:[T]]

## 2024-01-01 NOTE — SWALLOW BEDSIDE ASSESSMENT ADULT - SLP PRECAUTIONS/LIMITATIONS: VISION
Rx Refill Note  Requested Prescriptions     Pending Prescriptions Disp Refills   • pantoprazole (PROTONIX) 40 MG EC tablet 90 tablet 2     Sig: Take 1 tablet by mouth Daily.      Last office visit with prescribing clinician: 1/16/2023   Last telemedicine visit with prescribing clinician: Visit date not found   Next office visit with prescribing clinician: Visit date not found                         Would you like a call back once the refill request has been completed: [] Yes [] No    If the office needs to give you a call back, can they leave a voicemail: [] Yes [] No    Aldo Rodas MA  01/24/23, 15:55 EST  
within functional limits

## 2024-01-01 NOTE — H&P ADULT - PROBLEM SELECTOR PLAN 5
Patient on Eliquis at home, also with DVT  - Unable to tolerate PO, will start on Lovenox BID Patient on Eliquis at home, also with DVT  - Unable to tolerate PO Eliquis, will start on Lovenox BID  - Unable to tolerate PO metoprolol, will start IV lopressor 2.5 mg q6 with hold parameters Patient on Eliquis at home, also with history of DVT s/p ECMO  - Unable to tolerate PO Eliquis, will start on Lovenox BID  - Unable to tolerate PO metoprolol, will start IV lopressor 2.5 mg q6 with hold parameters

## 2024-01-01 NOTE — PATIENT PROFILE ADULT - FALL HARM RISK - HARM RISK INTERVENTIONS
Assistance with ambulation/Assistance OOB with selected safe patient handling equipment/Communicate Risk of Fall with Harm to all staff/Discuss with provider need for PT consult/Monitor gait and stability/Provide patient with walking aids - walker, cane, crutches/Reinforce activity limits and safety measures with patient and family/Tailored Fall Risk Interventions/Visual Cue: Yellow wristband and red socks/Bed in lowest position, wheels locked, appropriate side rails in place/Call bell, personal items and telephone in reach/Instruct patient to call for assistance before getting out of bed or chair/Non-slip footwear when patient is out of bed/[place] to call system/Physically safe environment - no spills, clutter or unnecessary equipment/Purposeful Proactive Rounding/Room/bathroom lighting operational, light cord in reach Assistance with ambulation/Assistance OOB with selected safe patient handling equipment/Communicate Risk of Fall with Harm to all staff/Discuss with provider need for PT consult/Monitor gait and stability/Provide patient with walking aids - walker, cane, crutches/Reinforce activity limits and safety measures with patient and family/Tailored Fall Risk Interventions/Visual Cue: Yellow wristband and red socks/Bed in lowest position, wheels locked, appropriate side rails in place/Call bell, personal items and telephone in reach/Instruct patient to call for assistance before getting out of bed or chair/Non-slip footwear when patient is out of bed/[place] to call system/Physically safe environment - no spills, clutter or unnecessary equipment/Purposeful Proactive Rounding/Room/bathroom lighting operational, light cord in reach

## 2024-01-01 NOTE — PROGRESS NOTE ADULT - PROBLEM SELECTOR PLAN 6
Says she does not require pain medication at this time  - Lidocaine PRN ordered  - Can consider toradol or ofirmev if needed for pain relief - patient on baclofen and gabapentin at home but cannot tolerate PO

## 2024-01-01 NOTE — H&P ADULT - TIME BILLING
Preparing to see the patient including review of tests and other providers' notes, confirming history with patient, performing medical examination and evaluation, counseling and educating the patient, communicating with other health care professionals, documenting clinical information in the EMR, independently interpreting results and communicating results to the patient, care coordination

## 2024-01-01 NOTE — H&P ADULT - PROBLEM SELECTOR PLAN 2
- ISO recent steroid use vs. new infection. Neutrophil predominant with no left shift  - Infectious workup as above  - CTM CBC, fever curve - ISO recent steroid use vs. new infection. Neutrophil predominant with no left shift  - Infectious workup as above  - CTM CBC, fever curve  - monitor off abx  - trend

## 2024-01-01 NOTE — H&P ADULT - NSHPLABSRESULTS_GEN_ALL_CORE
LABS: When present labs, imaging, and ECG were personally reviewed                          10.7   14.12 )-----------( 304      ( 31 Dec 2023 03:00 )             34.3       12-31    141  |  102  |  16  ----------------------------<  107<H>  3.5   |  30  |  0.58    Ca    9.7      31 Dec 2023 03:00  Phos  3.5     12-31  Mg     1.7     12-31    TPro  6.1  /  Alb  3.2<L>  /  TBili  1.0  /  DBili  x   /  AST  22  /  ALT  47<H>  /  AlkPhos  120  12-31       LIVER FUNCTIONS - ( 31 Dec 2023 03:00 )  Alb: 3.2 g/dL / Pro: 6.1 g/dL / ALK PHOS: 120 U/L / ALT: 47 U/L / AST: 22 U/L / GGT: x                    Urinalysis Basic - ( 31 Dec 2023 03:00 )    Color: x / Appearance: x / SG: x / pH: x  Gluc: 107 mg/dL / Ketone: x  / Bili: x / Urobili: x   Blood: x / Protein: x / Nitrite: x   Leuk Esterase: x / RBC: x / WBC x   Sq Epi: x / Non Sq Epi: x / Bacteria: x            Lactate Trend            CAPILLARY BLOOD GLUCOSE            Culture Results:   >=3 organisms. Probable collection contamination. (12-15 @ 21:45)  Culture Results:   No growth at 5 days (12-15 @ 18:12)  Culture Results:   No growth at 5 days (12-15 @ 18:05)  Culture Results:   No growth at 5 days (12-05 @ 11:00)  Culture Results:   No growth at 5 days (12-05 @ 11:00)  Culture Results:   >100,000 CFU/ml Enterococcus faecalis  <10,000 CFU/ml Normal Urogenital selvin present (12-04 @ 13:45)      RADIOLOGY & ADDITIONAL TESTS:   < from: CT Chest No Cont (12.31.23 @ 08:27) >    IMPRESSION:  Unchanged and persistent reticular opacities predominantly in bilateral   lower lobe subpleural distribution.  Additional interstitial interlobular septal thickening in bilateral lower   lobes may represent a component of pulmonary congestion. Underlying   interstitial lung disease cannot be excluded. Overall no significant   change to 12/5/2023.    < end of copied text > LABS: When present labs, imaging, and ECG were personally reviewed               10.7   14.12 )-----------( 304      ( 31 Dec 2023 03:00 )             34.3     12-31    141  |  102  |  16  ----------------------------<  107<H>  3.5   |  30  |  0.58    Ca    9.7      31 Dec 2023 03:00  Phos  3.5     12-31  Mg     1.7     12-31    TPro  6.1  /  Alb  3.2<L>  /  TBili  1.0  /  DBili  x   /  AST  22  /  ALT  47<H>  /  AlkPhos  120  12-31    D-Dimer Assay, Quantitative: <150 ng/mL DDU (12.31.23 @ 20:36)    Troponin I, High Sensitivity Result: 16.7  ng/L (12.31.23 @ 03:00)    Pro-Brain Natriuretic Peptide: 177 pg/mL (12.31.23 @ 03:00)    20:36 - VBG - pH: 7.43  | pCO2: 46    | pO2: 45    | Lactate: 1.1      Sedimentation Rate, Erythrocyte: 27 mm/hr (12.31.23 @ 23:51)  C-Reactive Protein, Serum: 98.3 mg/L (12.31.23 @ 23:51)    Rapid RVP Result: NotDetec (12.31.23 @ 23:31)  SARS-CoV-2: NotDetec (12.31.23 @ 23:31)     < from: CT Chest No Cont (12.31.23 @ 08:27) >  LUNGS AND AIRWAYS: Patent central airways.  Minimal bibasilar atelectatic changes. No pulmonary nodules. Unchanged bibasilar reticular opacities with predominantly subpleural distribution. No honeycombing. Additional interstitial interlobular septal thickening in the lower lobes is also   similar to prior study. No consolidation.  PLEURA: No pleural effusion. No pneumoperitoneum.  MEDIASTINUM AND NIRMALA: No lymphadenopathy.  VESSELS: Within normal limits.  HEART: Heart size is normal. No pericardial effusion.  CHEST WALL AND LOWER NECK: Normal thyroid gland.  VISUALIZED UPPER ABDOMEN: Tiny hypodensity in the caudate lobe of the liver. Cholelithiasis. Small hiatal hernia. Partially visualized spleen, adrenal glands, pancreas and stomach within normal limits.  BONES: Mild discogenic degenerative changes of the spine.  IMPRESSION: Unchanged and persistent reticular opacities predominantly in bilateral lower lobe subpleural distribution. Additional interstitial interlobular septal thickening in bilateral lower lobes may represent a component of pulmonary congestion. Underlying interstitial lung disease cannot be excluded. Overall no significant change to 12/5/2023.  < end of copied text >    < from: Xray Chest 1 View-PORTABLE IMMEDIATE (12.31.23 @ 03:27) >  Bibasilar linear opacities, favor atelectasis versus pneumonia. There are no pleural effusions. The cardiomediastinal silhouette is normal. Bones   are grossly normal.  IMPRESSION: Bibasilar atelectasis versus pneumonia.  < end of copied text >    EKG personally reviewed and interpreted - NSR 100bpm, Q III, QTc 428ms

## 2024-01-02 LAB
ANION GAP SERPL CALC-SCNC: 10 MMOL/L — SIGNIFICANT CHANGE UP (ref 7–14)
ANION GAP SERPL CALC-SCNC: 10 MMOL/L — SIGNIFICANT CHANGE UP (ref 7–14)
BUN SERPL-MCNC: 17 MG/DL — SIGNIFICANT CHANGE UP (ref 7–23)
BUN SERPL-MCNC: 17 MG/DL — SIGNIFICANT CHANGE UP (ref 7–23)
CALCIUM SERPL-MCNC: 9.7 MG/DL — SIGNIFICANT CHANGE UP (ref 8.4–10.5)
CALCIUM SERPL-MCNC: 9.7 MG/DL — SIGNIFICANT CHANGE UP (ref 8.4–10.5)
CHLORIDE SERPL-SCNC: 105 MMOL/L — SIGNIFICANT CHANGE UP (ref 98–107)
CHLORIDE SERPL-SCNC: 105 MMOL/L — SIGNIFICANT CHANGE UP (ref 98–107)
CO2 SERPL-SCNC: 26 MMOL/L — SIGNIFICANT CHANGE UP (ref 22–31)
CO2 SERPL-SCNC: 26 MMOL/L — SIGNIFICANT CHANGE UP (ref 22–31)
CREAT SERPL-MCNC: 0.52 MG/DL — SIGNIFICANT CHANGE UP (ref 0.5–1.3)
CREAT SERPL-MCNC: 0.52 MG/DL — SIGNIFICANT CHANGE UP (ref 0.5–1.3)
EGFR: 103 ML/MIN/1.73M2 — SIGNIFICANT CHANGE UP
EGFR: 103 ML/MIN/1.73M2 — SIGNIFICANT CHANGE UP
GLUCOSE SERPL-MCNC: 138 MG/DL — HIGH (ref 70–99)
GLUCOSE SERPL-MCNC: 138 MG/DL — HIGH (ref 70–99)
HCT VFR BLD CALC: 31.8 % — LOW (ref 34.5–45)
HCT VFR BLD CALC: 31.8 % — LOW (ref 34.5–45)
HGB BLD-MCNC: 9.5 G/DL — LOW (ref 11.5–15.5)
HGB BLD-MCNC: 9.5 G/DL — LOW (ref 11.5–15.5)
MAGNESIUM SERPL-MCNC: 2.2 MG/DL — SIGNIFICANT CHANGE UP (ref 1.6–2.6)
MAGNESIUM SERPL-MCNC: 2.2 MG/DL — SIGNIFICANT CHANGE UP (ref 1.6–2.6)
MCHC RBC-ENTMCNC: 24.3 PG — LOW (ref 27–34)
MCHC RBC-ENTMCNC: 24.3 PG — LOW (ref 27–34)
MCHC RBC-ENTMCNC: 29.9 GM/DL — LOW (ref 32–36)
MCHC RBC-ENTMCNC: 29.9 GM/DL — LOW (ref 32–36)
MCV RBC AUTO: 81.3 FL — SIGNIFICANT CHANGE UP (ref 80–100)
MCV RBC AUTO: 81.3 FL — SIGNIFICANT CHANGE UP (ref 80–100)
NRBC # BLD: 0 /100 WBCS — SIGNIFICANT CHANGE UP (ref 0–0)
NRBC # BLD: 0 /100 WBCS — SIGNIFICANT CHANGE UP (ref 0–0)
NRBC # FLD: 0 K/UL — SIGNIFICANT CHANGE UP (ref 0–0)
NRBC # FLD: 0 K/UL — SIGNIFICANT CHANGE UP (ref 0–0)
PHOSPHATE SERPL-MCNC: 3.8 MG/DL — SIGNIFICANT CHANGE UP (ref 2.5–4.5)
PHOSPHATE SERPL-MCNC: 3.8 MG/DL — SIGNIFICANT CHANGE UP (ref 2.5–4.5)
PLATELET # BLD AUTO: 319 K/UL — SIGNIFICANT CHANGE UP (ref 150–400)
PLATELET # BLD AUTO: 319 K/UL — SIGNIFICANT CHANGE UP (ref 150–400)
POTASSIUM SERPL-MCNC: 3.6 MMOL/L — SIGNIFICANT CHANGE UP (ref 3.5–5.3)
POTASSIUM SERPL-MCNC: 3.6 MMOL/L — SIGNIFICANT CHANGE UP (ref 3.5–5.3)
POTASSIUM SERPL-SCNC: 3.6 MMOL/L — SIGNIFICANT CHANGE UP (ref 3.5–5.3)
POTASSIUM SERPL-SCNC: 3.6 MMOL/L — SIGNIFICANT CHANGE UP (ref 3.5–5.3)
RBC # BLD: 3.91 M/UL — SIGNIFICANT CHANGE UP (ref 3.8–5.2)
RBC # BLD: 3.91 M/UL — SIGNIFICANT CHANGE UP (ref 3.8–5.2)
RBC # FLD: 16.3 % — HIGH (ref 10.3–14.5)
RBC # FLD: 16.3 % — HIGH (ref 10.3–14.5)
SODIUM SERPL-SCNC: 141 MMOL/L — SIGNIFICANT CHANGE UP (ref 135–145)
SODIUM SERPL-SCNC: 141 MMOL/L — SIGNIFICANT CHANGE UP (ref 135–145)
WBC # BLD: 8.95 K/UL — SIGNIFICANT CHANGE UP (ref 3.8–10.5)
WBC # BLD: 8.95 K/UL — SIGNIFICANT CHANGE UP (ref 3.8–10.5)
WBC # FLD AUTO: 8.95 K/UL — SIGNIFICANT CHANGE UP (ref 3.8–10.5)
WBC # FLD AUTO: 8.95 K/UL — SIGNIFICANT CHANGE UP (ref 3.8–10.5)

## 2024-01-02 PROCEDURE — 99232 SBSQ HOSP IP/OBS MODERATE 35: CPT | Mod: GC

## 2024-01-02 PROCEDURE — 99233 SBSQ HOSP IP/OBS HIGH 50: CPT

## 2024-01-02 RX ORDER — PSYLLIUM SEED (WITH DEXTROSE)
1 POWDER (GRAM) ORAL DAILY
Refills: 0 | Status: DISCONTINUED | OUTPATIENT
Start: 2024-01-02 | End: 2024-01-02

## 2024-01-02 RX ORDER — LIDOCAINE 4 G/100G
3 CREAM TOPICAL DAILY
Refills: 0 | Status: DISCONTINUED | OUTPATIENT
Start: 2024-01-02 | End: 2024-01-04

## 2024-01-02 RX ORDER — APIXABAN 2.5 MG/1
5 TABLET, FILM COATED ORAL EVERY 12 HOURS
Refills: 0 | Status: DISCONTINUED | OUTPATIENT
Start: 2024-01-02 | End: 2024-01-04

## 2024-01-02 RX ORDER — IPRATROPIUM BROMIDE 0.2 MG/ML
2 SOLUTION, NON-ORAL INHALATION
Refills: 0 | Status: DISCONTINUED | OUTPATIENT
Start: 2024-01-02 | End: 2024-01-02

## 2024-01-02 RX ORDER — LOPERAMIDE HCL 2 MG
2 TABLET ORAL THREE TIMES A DAY
Refills: 0 | Status: DISCONTINUED | OUTPATIENT
Start: 2024-01-02 | End: 2024-01-04

## 2024-01-02 RX ORDER — ZINC OXIDE 200 MG/G
1 OINTMENT TOPICAL
Refills: 0 | Status: DISCONTINUED | OUTPATIENT
Start: 2024-01-02 | End: 2024-01-02

## 2024-01-02 RX ORDER — SENNA PLUS 8.6 MG/1
2 TABLET ORAL AT BEDTIME
Refills: 0 | Status: DISCONTINUED | OUTPATIENT
Start: 2024-01-02 | End: 2024-01-02

## 2024-01-02 RX ORDER — FOLIC ACID 0.8 MG
1 TABLET ORAL DAILY
Refills: 0 | Status: DISCONTINUED | OUTPATIENT
Start: 2024-01-02 | End: 2024-01-02

## 2024-01-02 RX ORDER — ALPRAZOLAM 0.25 MG
0.25 TABLET ORAL EVERY 6 HOURS
Refills: 0 | Status: DISCONTINUED | OUTPATIENT
Start: 2024-01-02 | End: 2024-01-04

## 2024-01-02 RX ORDER — LOPERAMIDE HCL 2 MG
2 TABLET ORAL THREE TIMES A DAY
Refills: 0 | Status: DISCONTINUED | OUTPATIENT
Start: 2024-01-02 | End: 2024-01-02

## 2024-01-02 RX ORDER — SOD,AMMONIUM,POTASSIUM LACTATE
1 CREAM (GRAM) TOPICAL EVERY 12 HOURS
Refills: 0 | Status: DISCONTINUED | OUTPATIENT
Start: 2024-01-02 | End: 2024-01-02

## 2024-01-02 RX ORDER — ASCORBIC ACID 60 MG
500 TABLET,CHEWABLE ORAL DAILY
Refills: 0 | Status: DISCONTINUED | OUTPATIENT
Start: 2024-01-02 | End: 2024-01-02

## 2024-01-02 RX ORDER — ATOVAQUONE 750 MG/5ML
1500 SUSPENSION ORAL DAILY
Refills: 0 | Status: DISCONTINUED | OUTPATIENT
Start: 2024-01-02 | End: 2024-01-02

## 2024-01-02 RX ORDER — LANOLIN ALCOHOL/MO/W.PET/CERES
6 CREAM (GRAM) TOPICAL AT BEDTIME
Refills: 0 | Status: DISCONTINUED | OUTPATIENT
Start: 2024-01-02 | End: 2024-01-04

## 2024-01-02 RX ORDER — BENZOCAINE AND MENTHOL 5; 1 G/100ML; G/100ML
1 LIQUID ORAL
Refills: 0 | Status: DISCONTINUED | OUTPATIENT
Start: 2024-01-02 | End: 2024-01-04

## 2024-01-02 RX ORDER — CELECOXIB 200 MG/1
100 CAPSULE ORAL
Refills: 0 | Status: DISCONTINUED | OUTPATIENT
Start: 2024-01-02 | End: 2024-01-03

## 2024-01-02 RX ORDER — PSYLLIUM SEED (WITH DEXTROSE)
1 POWDER (GRAM) ORAL DAILY
Refills: 0 | Status: DISCONTINUED | OUTPATIENT
Start: 2024-01-02 | End: 2024-01-04

## 2024-01-02 RX ORDER — LIDOCAINE 4 G/100G
3 CREAM TOPICAL
Refills: 0 | Status: DISCONTINUED | OUTPATIENT
Start: 2024-01-02 | End: 2024-01-02

## 2024-01-02 RX ORDER — METOPROLOL TARTRATE 50 MG
12.5 TABLET ORAL
Refills: 0 | Status: DISCONTINUED | OUTPATIENT
Start: 2024-01-02 | End: 2024-01-04

## 2024-01-02 RX ORDER — GABAPENTIN 400 MG/1
300 CAPSULE ORAL THREE TIMES A DAY
Refills: 0 | Status: DISCONTINUED | OUTPATIENT
Start: 2024-01-02 | End: 2024-01-02

## 2024-01-02 RX ORDER — SODIUM CHLORIDE 0.65 %
1 AEROSOL, SPRAY (ML) NASAL THREE TIMES A DAY
Refills: 0 | Status: DISCONTINUED | OUTPATIENT
Start: 2024-01-02 | End: 2024-01-02

## 2024-01-02 RX ORDER — LANOLIN ALCOHOL/MO/W.PET/CERES
6 CREAM (GRAM) TOPICAL AT BEDTIME
Refills: 0 | Status: DISCONTINUED | OUTPATIENT
Start: 2024-01-02 | End: 2024-01-02

## 2024-01-02 RX ORDER — LACTOBACILLUS ACIDOPHILUS 100MM CELL
1 CAPSULE ORAL
Refills: 0 | Status: DISCONTINUED | OUTPATIENT
Start: 2024-01-02 | End: 2024-01-04

## 2024-01-02 RX ORDER — METOPROLOL TARTRATE 50 MG
12.5 TABLET ORAL
Refills: 0 | Status: DISCONTINUED | OUTPATIENT
Start: 2024-01-02 | End: 2024-01-02

## 2024-01-02 RX ORDER — SENNA PLUS 8.6 MG/1
2 TABLET ORAL AT BEDTIME
Refills: 0 | Status: DISCONTINUED | OUTPATIENT
Start: 2024-01-02 | End: 2024-01-04

## 2024-01-02 RX ORDER — SOD,AMMONIUM,POTASSIUM LACTATE
1 CREAM (GRAM) TOPICAL EVERY 12 HOURS
Refills: 0 | Status: DISCONTINUED | OUTPATIENT
Start: 2024-01-02 | End: 2024-01-04

## 2024-01-02 RX ORDER — GABAPENTIN 400 MG/1
300 CAPSULE ORAL THREE TIMES A DAY
Refills: 0 | Status: DISCONTINUED | OUTPATIENT
Start: 2024-01-02 | End: 2024-01-04

## 2024-01-02 RX ORDER — SODIUM CHLORIDE 0.65 %
1 AEROSOL, SPRAY (ML) NASAL THREE TIMES A DAY
Refills: 0 | Status: DISCONTINUED | OUTPATIENT
Start: 2024-01-02 | End: 2024-01-04

## 2024-01-02 RX ORDER — BACLOFEN 100 %
5 POWDER (GRAM) MISCELLANEOUS EVERY 8 HOURS
Refills: 0 | Status: DISCONTINUED | OUTPATIENT
Start: 2024-01-02 | End: 2024-01-02

## 2024-01-02 RX ORDER — LIDOCAINE 4 G/100G
1 CREAM TOPICAL DAILY
Refills: 0 | Status: DISCONTINUED | OUTPATIENT
Start: 2024-01-02 | End: 2024-01-02

## 2024-01-02 RX ORDER — ZINC OXIDE 200 MG/G
1 OINTMENT TOPICAL
Refills: 0 | Status: DISCONTINUED | OUTPATIENT
Start: 2024-01-02 | End: 2024-01-04

## 2024-01-02 RX ORDER — ASCORBIC ACID 60 MG
500 TABLET,CHEWABLE ORAL DAILY
Refills: 0 | Status: DISCONTINUED | OUTPATIENT
Start: 2024-01-02 | End: 2024-01-04

## 2024-01-02 RX ORDER — APIXABAN 2.5 MG/1
5 TABLET, FILM COATED ORAL EVERY 12 HOURS
Refills: 0 | Status: DISCONTINUED | OUTPATIENT
Start: 2024-01-02 | End: 2024-01-02

## 2024-01-02 RX ORDER — HYDROCORTISONE 1 %
1 OINTMENT (GRAM) TOPICAL
Refills: 0 | Status: DISCONTINUED | OUTPATIENT
Start: 2024-01-02 | End: 2024-01-02

## 2024-01-02 RX ORDER — HYDROCORTISONE 1 %
1 OINTMENT (GRAM) TOPICAL
Refills: 0 | Status: DISCONTINUED | OUTPATIENT
Start: 2024-01-02 | End: 2024-01-04

## 2024-01-02 RX ORDER — POLYETHYLENE GLYCOL 3350 17 G/17G
17 POWDER, FOR SOLUTION ORAL DAILY
Refills: 0 | Status: DISCONTINUED | OUTPATIENT
Start: 2024-01-02 | End: 2024-01-04

## 2024-01-02 RX ORDER — NYSTATIN CREAM 100000 [USP'U]/G
1 CREAM TOPICAL
Refills: 0 | Status: DISCONTINUED | OUTPATIENT
Start: 2024-01-02 | End: 2024-01-02

## 2024-01-02 RX ORDER — ATOVAQUONE 750 MG/5ML
1500 SUSPENSION ORAL DAILY
Refills: 0 | Status: DISCONTINUED | OUTPATIENT
Start: 2024-01-02 | End: 2024-01-04

## 2024-01-02 RX ORDER — FOLIC ACID 0.8 MG
1 TABLET ORAL DAILY
Refills: 0 | Status: DISCONTINUED | OUTPATIENT
Start: 2024-01-02 | End: 2024-01-04

## 2024-01-02 RX ORDER — PANTOPRAZOLE SODIUM 20 MG/1
40 TABLET, DELAYED RELEASE ORAL
Refills: 0 | Status: DISCONTINUED | OUTPATIENT
Start: 2024-01-02 | End: 2024-01-04

## 2024-01-02 RX ORDER — BACLOFEN 100 %
5 POWDER (GRAM) MISCELLANEOUS EVERY 8 HOURS
Refills: 0 | Status: DISCONTINUED | OUTPATIENT
Start: 2024-01-02 | End: 2024-01-04

## 2024-01-02 RX ORDER — PANTOPRAZOLE SODIUM 20 MG/1
40 TABLET, DELAYED RELEASE ORAL
Refills: 0 | Status: DISCONTINUED | OUTPATIENT
Start: 2024-01-02 | End: 2024-01-02

## 2024-01-02 RX ORDER — ALPRAZOLAM 0.25 MG
0.25 TABLET ORAL EVERY 6 HOURS
Refills: 0 | Status: DISCONTINUED | OUTPATIENT
Start: 2024-01-02 | End: 2024-01-02

## 2024-01-02 RX ORDER — IPRATROPIUM BROMIDE 0.2 MG/ML
2 SOLUTION, NON-ORAL INHALATION
Refills: 0 | Status: DISCONTINUED | OUTPATIENT
Start: 2024-01-02 | End: 2024-01-04

## 2024-01-02 RX ORDER — BENZOCAINE AND MENTHOL 5; 1 G/100ML; G/100ML
1 LIQUID ORAL
Refills: 0 | Status: DISCONTINUED | OUTPATIENT
Start: 2024-01-02 | End: 2024-01-02

## 2024-01-02 RX ORDER — LACTOBACILLUS ACIDOPHILUS 100MM CELL
1 CAPSULE ORAL
Refills: 0 | Status: DISCONTINUED | OUTPATIENT
Start: 2024-01-02 | End: 2024-01-02

## 2024-01-02 RX ORDER — CELECOXIB 200 MG/1
100 CAPSULE ORAL
Refills: 0 | Status: DISCONTINUED | OUTPATIENT
Start: 2024-01-02 | End: 2024-01-02

## 2024-01-02 RX ORDER — POLYETHYLENE GLYCOL 3350 17 G/17G
17 POWDER, FOR SOLUTION ORAL DAILY
Refills: 0 | Status: DISCONTINUED | OUTPATIENT
Start: 2024-01-02 | End: 2024-01-02

## 2024-01-02 RX ORDER — DEXAMETHASONE 0.5 MG/5ML
10 ELIXIR ORAL THREE TIMES A DAY
Refills: 0 | Status: COMPLETED | OUTPATIENT
Start: 2024-01-02 | End: 2024-01-03

## 2024-01-02 RX ADMIN — FAMOTIDINE 20 MILLIGRAM(S): 10 INJECTION INTRAVENOUS at 17:46

## 2024-01-02 RX ADMIN — ZINC OXIDE 1 APPLICATION(S): 200 OINTMENT TOPICAL at 17:48

## 2024-01-02 RX ADMIN — ATOVAQUONE 1500 MILLIGRAM(S): 750 SUSPENSION ORAL at 22:04

## 2024-01-02 RX ADMIN — Medication 1 TABLET(S): at 13:29

## 2024-01-02 RX ADMIN — Medication 2 PUFF(S): at 06:21

## 2024-01-02 RX ADMIN — GABAPENTIN 300 MILLIGRAM(S): 400 CAPSULE ORAL at 22:00

## 2024-01-02 RX ADMIN — Medication 5 MILLIGRAM(S): at 06:23

## 2024-01-02 RX ADMIN — Medication 102 MILLIGRAM(S): at 14:49

## 2024-01-02 RX ADMIN — CELECOXIB 100 MILLIGRAM(S): 200 CAPSULE ORAL at 18:45

## 2024-01-02 RX ADMIN — APIXABAN 5 MILLIGRAM(S): 2.5 TABLET, FILM COATED ORAL at 17:43

## 2024-01-02 RX ADMIN — Medication 50 MILLIGRAM(S): at 22:06

## 2024-01-02 RX ADMIN — Medication 1 TABLET(S): at 17:45

## 2024-01-02 RX ADMIN — Medication 12.5 MILLIGRAM(S): at 06:23

## 2024-01-02 RX ADMIN — BENZOCAINE AND MENTHOL 1 LOZENGE: 5; 1 LIQUID ORAL at 17:45

## 2024-01-02 RX ADMIN — ENOXAPARIN SODIUM 120 MILLIGRAM(S): 100 INJECTION SUBCUTANEOUS at 06:22

## 2024-01-02 RX ADMIN — LIDOCAINE 1 PATCH: 4 CREAM TOPICAL at 13:30

## 2024-01-02 RX ADMIN — Medication 2 MILLIGRAM(S): at 17:44

## 2024-01-02 RX ADMIN — Medication 12.5 MILLIGRAM(S): at 17:48

## 2024-01-02 RX ADMIN — CELECOXIB 100 MILLIGRAM(S): 200 CAPSULE ORAL at 17:46

## 2024-01-02 RX ADMIN — GABAPENTIN 300 MILLIGRAM(S): 400 CAPSULE ORAL at 14:50

## 2024-01-02 RX ADMIN — LIDOCAINE 3 PATCH: 4 CREAM TOPICAL at 20:21

## 2024-01-02 RX ADMIN — GABAPENTIN 300 MILLIGRAM(S): 400 CAPSULE ORAL at 06:22

## 2024-01-02 RX ADMIN — Medication 2 PUFF(S): at 17:43

## 2024-01-02 RX ADMIN — Medication 5 MILLIGRAM(S): at 13:30

## 2024-01-02 RX ADMIN — Medication 102 MILLIGRAM(S): at 23:22

## 2024-01-02 RX ADMIN — LIDOCAINE 1 PATCH: 4 CREAM TOPICAL at 19:19

## 2024-01-02 RX ADMIN — Medication 1 DROP(S): at 23:15

## 2024-01-02 NOTE — PROGRESS NOTE ADULT - PROBLEM SELECTOR PLAN 5
Patient on Eliquis at home, also with DVT  - Unable to tolerate PO Eliquis, will start on Lovenox BID  - Unable to tolerate PO metoprolol, will start IV lopressor 2.5 mg q6 with hold parameters - home eliquis and metoprolol restarted

## 2024-01-02 NOTE — PROGRESS NOTE ADULT - PROBLEM SELECTOR PLAN 3
SUBJECTIVE:  Patient is a 33 year old       here today with complaints of vaginal discharge and foul odor.  Onset was about two weeks ago. She reports itching and some irritation, also foul odor. She notes that she had unprotected sexual intercourse with her ex-, first time since last year. She reports that he is the only sexual partner she has had in the past year. She has a history of STIs. She denies dysuria, urgency, frequency, fever, dyspareunia, abdominal pain, constipation, abnormal bleeding or other concerns.  She has been using OCPs for contraception.      ALLERGIES:  Tape [adhesive   (environmental)]  Current Outpatient Medications   Medication Sig Dispense Refill   • fluticasone (FLONASE) 50 MCG/ACT nasal spray Spray 1 spray in each nostril daily. 16 g 12   • loratadine (CLARITIN) 10 MG tablet Take 1 tablet by mouth daily. 90 tablet 3   • norethindrone-ethinyl estradiol (LOESTRIN , ,) 1-20 MG-MCG per tablet Take 1 tablet by mouth daily. 84 tablet 1   • metroNIDAZOLE (FLAGYL) 500 MG tablet Take 1 tablet by mouth 2 times daily. 14 tablet 0   • cyclobenzaprine (FLEXERIL) 10 MG tablet Take 1 tablet by mouth 3 times daily as needed for Muscle spasms. 20 tablet 0     No current facility-administered medications for this visit.      Past Medical History:   Diagnosis Date   • Abnormal finding on Pap smear    • Absence of menstruation 2013    LMP of 13    • Anxiety    • BV (bacterial vaginosis)     recurrent   • Depressive disorder    • History of chickenpox    • History of postpartum depression     postpartum with son and daughter   • HSV infection 2014    outside provider   • Overdose    • STD (sexually transmitted disease)     Chlamydia ~2004   • Yeast infection     recurrent     Last Pap:  10/31/2019, ASCUS    OBJECTIVE:    Visit Vitals  /72 (BP Location: RUE - Right upper extremity, Patient Position: Sitting, Cuff Size: Regular)   Pulse 73   Wt 98.4 kg   LMP 10/19/2020 (Exact  Date)   BMI 33.47 kg/m²     Well developed, black female in no acute distress  Alert and oriented x 3  Respiratory:  Even and unlabored  Skin:  Warm, dry and intact without lesion/bite  Abdomen:  Soft, NT (nontender), without mass.  Positive BS (bowel sounds) x 4  Pelvic:  Normal external female genitalia without lesion or mass.  Bartholin’s glands, urethra, [place]’s gland without mass or exudate.  Vagina is slightly inflamed and well rugated without lesion.  Cervix: strawberry spots present.  Moderate amount of watery, white vaginal discharge present with odor.    Lab:  GC/Ct PCR is collected and sent.  Wet mount is collected and is positive for trich and clue cells. Negative for yeast.     Bimanual:  No CMT (cervical motion tenderness). Tenderness in vaginal vault. Uterus is small, firm and mobile, retroverted.  Midline.  Adnexa are without tenderness or mass.    Extremities:  Without edema, normal gait.     ASSESSMENT:    -Trichomoniasis   -Bacterial vaginosis   -STI screening     PLAN:    1. Prescription for metronidazole and miconazole cream given with instructions for use to pharmacy of choice.  2.Test of cure in 2-3 weeks following treatment completion  3. Safe sex practices reviewed  4. Patient to inform partner for treatment.     Nuria Rogers CNM   Crackles in lungs, CT with ILD  - Steroids as above  - Pulm recs appreciated  - Notes she desats during sleep for which she was started on 1L NC but she feels it does not help as she mainly breathes through her mouth. Started on venturi mask  - Noted to be on atovaquone prophylaxis as outpatient, will follow up S&S eval - if tolerates will restart, if not will need alternative Crackles in lungs, CT with ILD  - Steroids as above  - Pulm recs appreciated  - Notes she desats during sleep for which she was started on 1L NC but she feels it does not help as she mainly breathes through her mouth. Started on venturi mask

## 2024-01-02 NOTE — PROGRESS NOTE ADULT - PROBLEM SELECTOR PLAN 1
ENT scope with arytenoid edema; plan to reassess 6 hours   - Angioedema cocktail  -- dexamethasone loading dose given, now continue on dexamethasone 10 q8 (had been on a steroid taper currently on solumedrol 20 at home, so this would represent an increase)  -- diphenhydramine 50 mg IV q12hr  -- famotidine 30 mg IV q12hr-> per pharmacy max daily dose is 20mg   - ESR/CRP elevated, procal negative, RVP negative  - SLP evaluation pending  - Allergy referral on discharge  - Strep pending  - Consider discussion with pulm whether appropriate for transfer to [facility] ENT scope with arytenoid edema  - Angioedema cocktail  -- dexamethasone loading dose given, now continue on dexamethasone 10 q8 (had been on a steroid taper currently on solumedrol 20 at home, so this would represent an increase)  -- diphenhydramine 50 mg IV q12hr  -- famotidine 30 mg IV q12hr-> per pharmacy max daily dose is 20mg   - ESR/CRP elevated, procal negative, RVP negative  - strep negative  - Allergy referral on discharge

## 2024-01-02 NOTE — PROGRESS NOTE ADULT - ASSESSMENT
Ms. Hager is a  62 YO woman with PMH of pAfib, sciatica, anxiety, ILD, admission 10/25/23-12/20/23 (transfer from [facility] to [facility]) for SOB, during which she required ECMO, plasmapheresis/rituximab, high-dose steroids, ccb platelet transfusions, dysphagia, RIJ DVT, discharged on steroid taper who [facility] with worsening shortness of breath, cough and difficulty tolerating secretions x1-2 days, found to have laryngeal edema.  Ms. Hager is a  62 YO woman with PMH of pAfib, sciatica, anxiety, ILD, admission 10/25/23-12/20/23 (transfer from [facility] to [facility]) for SOB, during which she required ECMO, plasmapheresis/rituximab, high-dose steroids, ccb platelet transfusions, dysphagia, RIJ DVT, discharged on steroid taper [facility] shortness of breath, cough and difficulty tolerating secretions x1-2 days, found to have laryngeal edema.

## 2024-01-02 NOTE — PROGRESS NOTE ADULT - SUBJECTIVE AND OBJECTIVE BOX
PROGRESS NOTE:     Patient is a 63y old  Female who presents with a chief complaint of Inability to tolerate secretions (01 Jan 2024 11:15)      SUBJECTIVE / OVERNIGHT EVENTS:    ADDITIONAL REVIEW OF SYSTEMS: 10 point ROS negative except per HPI    MEDICATIONS  (STANDING):  atovaquone  Suspension 1500 milliGRAM(s) Oral daily  baclofen 5 milliGRAM(s) Oral every 8 hours  celecoxib 100 milliGRAM(s) Oral two times a day  diphenhydrAMINE Injectable 50 milliGRAM(s) IV Push every 12 hours  enoxaparin Injectable 120 milliGRAM(s) SubCutaneous every 12 hours  famotidine Injectable 20 milliGRAM(s) IV Push every 12 hours  gabapentin 300 milliGRAM(s) Oral three times a day  ipratropium 17 MICROgram(s) HFA Inhaler 2 Puff(s) Inhalation <User Schedule>  metoprolol tartrate 12.5 milliGRAM(s) Oral every 12 hours  nystatin Powder 1 Application(s) Topical two times a day  pantoprazole  Injectable 40 milliGRAM(s) IV Push daily  polyethylene glycol 3350 17 Gram(s) Oral daily  psyllium Powder 1 Packet(s) Oral daily  senna 2 Tablet(s) Oral at bedtime  zinc oxide 40% Paste 1 Application(s) Topical two times a day    MEDICATIONS  (PRN):  ALPRAZolam 0.25 milliGRAM(s) Oral every 6 hours PRN Anxiety  ammonium lactate 12% Lotion 1 Application(s) Topical every 12 hours PRN BL feet dryness  artificial tears (preservative free) Ophthalmic Solution 1 Drop(s) Both EYES every 12 hours PRN Dry Eyes  lidocaine   4% Patch 1 Patch Transdermal daily PRN back pain      CAPILLARY BLOOD GLUCOSE        I&O's Summary      PHYSICAL EXAM:  Vital Signs Last 24 Hrs  T(C): 36.3 (02 Jan 2024 01:20), Max: 37.1 (01 Jan 2024 14:20)  T(F): 97.4 (02 Jan 2024 01:20), Max: 98.7 (01 Jan 2024 14:20)  HR: 73 (02 Jan 2024 01:20) (73 - 93)  BP: 121/78 (02 Jan 2024 01:20) (104/58 - 131/39)  BP(mean): --  RR: 16 (02 Jan 2024 01:20) (16 - 18)  SpO2: 99% (02 Jan 2024 01:20) (94% - 99%)    Parameters below as of 02 Jan 2024 01:20  Patient On (Oxygen Delivery Method): mask, Venturi  O2 Flow (L/min): 2      CONSTITUTIONAL: NAD, well-developed, A&Ox3 to person, place, time.  RESPIRATORY: Normal respiratory effort; lungs are clear to auscultation bilaterally  CARDIOVASCULAR: Regular rate and rhythm, normal S1 and S2, no murmur/rub/gallop; No lower extremity edema; Peripheral pulses are 2+ bilaterally  ABDOMEN: Nontender to palpation, no rebound/guarding; No hepatosplenomegaly  MUSCLOSKELETAL: no clubbing or cyanosis of digits; no joint swelling or tenderness to palpation  NEURO: CN 2-12 grossly intact, moves all limbs spontaneously      LABS:                          10.2   9.23  )-----------( 298      ( 01 Jan 2024 07:35 )             33.6                 -----    MICRO      -----    TRENDS  Hemoglobin: 10.2 g/dL (01-01 @ 07:35)  Hemoglobin: 10.7 g/dL (12-31 @ 03:00)    Creatinine Trend: 0.58<--, 0.65<--, 0.56<--, 0.80<--, 0.70<--, 0.81<--  ----  RADIOLOGY & ADDITIONAL TESTS:  Results Reviewed:   Imaging Personally Reviewed:  Electrocardiogram Personally Reviewed:    COORDINATION OF CARE:  Care Discussed with Consultants/Other Providers [Y/N]:  Prior or Outpatient Records Reviewed [Y/N]:   PROGRESS NOTE:     Patient is a 63y old  Female who presents with a chief complaint of Inability to tolerate secretions (01 Jan 2024 11:15)      SUBJECTIVE / OVERNIGHT EVENTS: No overnight events. Pt feeling improved this morning. Experiencing mild soreness in throat area, but otherwise breathing, swallowing, and secretions improved.     ADDITIONAL REVIEW OF SYSTEMS: 10 point ROS negative except per HPI    MEDICATIONS  (STANDING):  atovaquone  Suspension 1500 milliGRAM(s) Oral daily  baclofen 5 milliGRAM(s) Oral every 8 hours  celecoxib 100 milliGRAM(s) Oral two times a day  diphenhydrAMINE Injectable 50 milliGRAM(s) IV Push every 12 hours  enoxaparin Injectable 120 milliGRAM(s) SubCutaneous every 12 hours  famotidine Injectable 20 milliGRAM(s) IV Push every 12 hours  gabapentin 300 milliGRAM(s) Oral three times a day  ipratropium 17 MICROgram(s) HFA Inhaler 2 Puff(s) Inhalation <User Schedule>  metoprolol tartrate 12.5 milliGRAM(s) Oral every 12 hours  nystatin Powder 1 Application(s) Topical two times a day  pantoprazole  Injectable 40 milliGRAM(s) IV Push daily  polyethylene glycol 3350 17 Gram(s) Oral daily  psyllium Powder 1 Packet(s) Oral daily  senna 2 Tablet(s) Oral at bedtime  zinc oxide 40% Paste 1 Application(s) Topical two times a day    MEDICATIONS  (PRN):  ALPRAZolam 0.25 milliGRAM(s) Oral every 6 hours PRN Anxiety  ammonium lactate 12% Lotion 1 Application(s) Topical every 12 hours PRN BL feet dryness  artificial tears (preservative free) Ophthalmic Solution 1 Drop(s) Both EYES every 12 hours PRN Dry Eyes  lidocaine   4% Patch 1 Patch Transdermal daily PRN back pain      CAPILLARY BLOOD GLUCOSE        I&O's Summary      PHYSICAL EXAM:  Vital Signs Last 24 Hrs  T(C): 36.3 (02 Jan 2024 01:20), Max: 37.1 (01 Jan 2024 14:20)  T(F): 97.4 (02 Jan 2024 01:20), Max: 98.7 (01 Jan 2024 14:20)  HR: 73 (02 Jan 2024 01:20) (73 - 93)  BP: 121/78 (02 Jan 2024 01:20) (104/58 - 131/39)  BP(mean): --  RR: 16 (02 Jan 2024 01:20) (16 - 18)  SpO2: 99% (02 Jan 2024 01:20) (94% - 99%)    Parameters below as of 02 Jan 2024 01:20  Patient On (Oxygen Delivery Method): mask, Venturi  O2 Flow (L/min): 2      CONSTITUTIONAL: NAD, well-developed, A&Ox3 to person, place, time.  RESPIRATORY: Normal respiratory effort; lungs are clear to auscultation bilaterally; no stridor or wheezing  CARDIOVASCULAR: Regular rate and rhythm, normal S1 and S2, no murmur/rub/gallop; No lower extremity edema; Peripheral pulses are 2+ bilaterally  ABDOMEN: Nontender to palpation, no rebound/guarding; No hepatosplenomegaly  MUSCLOSKELETAL: no clubbing or cyanosis of digits; no joint swelling or tenderness to palpation  NEURO: CN 2-12 grossly intact, moves all limbs spontaneously      LABS:                          10.2   9.23  )-----------( 298      ( 01 Jan 2024 07:35 )             33.6                 -----    MICRO      -----    TRENDS  Hemoglobin: 10.2 g/dL (01-01 @ 07:35)  Hemoglobin: 10.7 g/dL (12-31 @ 03:00)    Creatinine Trend: 0.58<--, 0.65<--, 0.56<--, 0.80<--, 0.70<--, 0.81<--  ----  RADIOLOGY & ADDITIONAL TESTS:  Results Reviewed:   Imaging Personally Reviewed:  Electrocardiogram Personally Reviewed:    COORDINATION OF CARE:  Care Discussed with Consultants/Other Providers [Y/N]:  Prior or Outpatient Records Reviewed [Y/N]:

## 2024-01-02 NOTE — PROGRESS NOTE ADULT - ASSESSMENT
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 with initial steroid response c/b progressive hypoxemic and hypercapnic respiratory failure requiring intubation and VV- ECMO on 9/13 in the setting of steroid taper, then transferred to [facility] with differential at that time considering DAH vs ILD flare. She was decannulated from VV-ECMO 9/21, extubated 9/22. She received pulse steroids, PLEX (9/15, 9/18, 9/20) and Rituximab (9/16 & 10/3). Her course c/b severe leukopenia s/p filgastrim, shock liver with slow to resolved hyperbilirubinemia, RIJ DVT, oropharyngeal dysphagia, and recurrent SVT.   She clinically improved and was discharged to  rehab.     She present with worsening dysphagia, and odynophagia.   Her chest imaging was largely unchanged from 12/5 and significantly improved from months prior in 9/2023.   Since we saw her ENT has scoped the patient and noted edematous arytenoids concerning for angioedema. Unclear etiology.   She has been on high dose steroids taper. Now down to 20mg daily with a taper plan over the several weeks.  Work up so far with negative strep, RVP, CT signs of known ILD. Has been on dexamethasone with improvement. Pending repeat scope by ENT.   Unclear trigger, new medication is Celecoxib.    # ILD  # Angioedema  # MCTD  # JACEY  -  ILD is stable on imaging. On steroids taper as outpatient currently on decadron inpatient.   - When completed course of decadron will need to be restarted on her pre-existing taper. Starting at 20mg.  - Patient will likely benefit from PCP ppx with mepron, patient reluctant today but will follow up. Patient has been on prolonged course of steroids  - Will need outpatient PSG for likely JACEY.  - Is flying to [place] next week. Will has pulm follow up in [place], Will also need Sleep medicine and Rheum folllow up.     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 with initial steroid response c/b progressive hypoxemic and hypercapnic respiratory failure requiring intubation and VV- ECMO on 9/13 in the setting of steroid taper, then transferred to [facility] with differential at that time considering DAH vs ILD flare. She was decannulated from VV-ECMO 9/21, extubated 9/22. She received pulse steroids, PLEX (9/15, 9/18, 9/20) and Rituximab (9/16 & 10/3). Her course c/b severe leukopenia s/p filgastrim, shock liver with slow to resolved hyperbilirubinemia, RIJ DVT, oropharyngeal dysphagia, and recurrent SVT.   She clinically improved and was discharged to  rehab.     She present with worsening dysphagia, and odynophagia.   Her chest imaging was largely unchanged from 12/5 and significantly improved from months prior in 9/2023.   Since we saw her ENT has scoped the patient and noted edematous arytenoids concerning for angioedema. Unclear etiology.   She has been on high dose steroids taper. Now down to 20mg daily with a taper plan over the several weeks.  Work up so far with negative strep, RVP, CT signs of known ILD. Has been on dexamethasone with improvement. Pending repeat scope by ENT.   Unclear trigger, new medication is Celecoxib.    # ILD  # Angioedema  # MCTD  # JACEY  -  ILD is stable on imaging. On steroids taper as outpatient currently on decadron inpatient.   - When completed course of decadron will need to be restarted on her pre-existing taper. Starting at 20mg.  - Patient will likely benefit from PCP ppx with mepron, patient reluctant today but will follow up. Patient has been on prolonged course of steroids  - Will need outpatient PSG for likely JACEY.  - Is flying to [place] next week. Will has pulm follow up in [place], Will also need Sleep medicine and Rheum folllow up.     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 in the setting of ILD exacerbation? with initial steroid response c/b progressive hypoxemic and hypercapnic respiratory failure requiring intubation and VV- ECMO on 9/13 in the setting of steroid taper, then transferred to [facility] with differential at that time considering DAH vs ILD flare. She was decannulated from VV-ECMO 9/21, extubated 9/22. She received pulse steroids, PLEX (9/15, 9/18, 9/20) and Rituximab (9/16 & 10/3). Her course c/b severe leukopenia s/p filgastrim, shock liver with slow to resolved hyperbilirubinemia, RIJ DVT, oropharyngeal dysphagia, and recurrent SVT.   She clinically improved and was discharged to  rehab.     She present with worsening dysphagia, and odynophagia.   Her chest imaging was largely unchanged from 12/5 and significantly improved from months prior in 9/2023.   Since we saw her ENT has scoped the patient and noted edematous arytenoids concerning for angioedema. Unclear etiology.   She has been on high dose steroids taper. Now down to 20mg daily with a taper plan over the several weeks.  Work up so far with negative strep, RVP, CT signs of known ILD. Has been on dexamethasone with improvement. Pending repeat scope by ENT.   Unclear trigger, new medication is Celecoxib.    # ILD  # Angioedema  # MCTD  # JACEY  - ILD is stable on imaging. On steroids taper as outpatient currently on decadron inpatient.   - When she completes her course of decadron she will need to be restarted on her pre-existing taper. Starting at 20mg.  - Agree with continuing mepron for PCP  - Will need outpatient PSG for likely JACEY.  - Is flying to [place] next week. Will has pulm follow up in [place], Will also need Sleep medicine and Rheum follow up.   - Would check C3/C4, C1 esterase inhibitor, tryptase level  - Pulmonary will follow.     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 with initial steroid response c/b progressive hypoxemic and hypercapnic respiratory failure requiring intubation and VV- ECMO on 9/13 in the setting of steroid taper, then transferred to [facility] with differential at that time considering DAH vs ILD flare. She was decannulated from VV-ECMO 9/21, extubated 9/22. She received pulse steroids, PLEX (9/15, 9/18, 9/20) and Rituximab (9/16 & 10/3). Her course c/b severe leukopenia s/p filgastrim, shock liver with slow to resolved hyperbilirubinemia, RIJ DVT, oropharyngeal dysphagia, and recurrent SVT.   She clinically improved and was discharged to  rehab.     She present with worsening dysphagia, and odynophagia.   Her chest imaging was largely unchanged from 12/5 and significantly improved from months prior in 9/2023.   Since we saw her ENT has scoped the patient and noted edematous arytenoids concerning for angioedema. Unclear etiology.   She has been on high dose steroids taper. Now down to 20mg daily with a taper plan over the several weeks.  Work up so far with negative strep, RVP, CT signs of known ILD. Has been on dexamethasone with improvement. Pending repeat scope by ENT.   Unclear trigger, new medication is Celecoxib.    # ILD  # Angioedema  # MCTD  # JACEY  - ILD is stable on imaging. On steroids taper as outpatient currently on decadron inpatient.   - When she completes her course of decadron she will need to be restarted on her pre-existing taper. Starting at 20mg.  - Agree with continuing mepron for PCP  - Will need outpatient PSG for likely JACEY.  - Is flying to [place] next week. Will has pulm follow up in [place], Will also need Sleep medicine and Rheum follow up.   - Would check C3/C4, C1 esterase inhibitor, tryptase level  - Pulmonary will follow.

## 2024-01-02 NOTE — PROGRESS NOTE ADULT - SUBJECTIVE AND OBJECTIVE BOX
Interval Events: Patient was seen and examined at bedside.    Feeling better, able to phonate improved Now able to swallow pills.     REVIEW OF SYSTEMS:  Constitutional: [- ] fevers [- ] chills [ -] weight loss [ -] weight gain  CV: [- ] chest pain [- ] orthopnea [ -] palpitations [- ] murmur  Resp: [ ] cough [ ] shortness of breath [ ] dyspnea [ ] wheezing [ ] sputum [ ] hemoptysis  [ ] All other systems negative  [ ] Unable to assess ROS because ________    OBJECTIVE:  ICU Vital Signs Last 24 Hrs  T(C): 36.7 (02 Jan 2024 09:44), Max: 37.1 (01 Jan 2024 14:20)  T(F): 98.1 (02 Jan 2024 09:44), Max: 98.7 (01 Jan 2024 14:20)  HR: 84 (02 Jan 2024 09:44) (70 - 93)  BP: 109/56 (02 Jan 2024 09:44) (104/58 - 131/39)  BP(mean): --  ABP: --  ABP(mean): --  RR: 18 (02 Jan 2024 09:44) (16 - 18)  SpO2: 95% (02 Jan 2024 09:44) (94% - 100%)    O2 Parameters below as of 02 Jan 2024 09:44  Patient On (Oxygen Delivery Method): room air              CAPILLARY BLOOD GLUCOSE          PHYSICAL EXAM:        HOSPITAL MEDICATIONS:  MEDICATIONS  (STANDING):  apixaban 5 milliGRAM(s) Oral every 12 hours  artificial  tears Solution 1 Drop(s) Both EYES every 12 hours  ascorbic acid 500 milliGRAM(s) Oral daily  atovaquone  Suspension 1500 milliGRAM(s) Oral daily  benzocaine/menthol Lozenge 1 Lozenge Oral two times a day  celecoxib 100 milliGRAM(s) Oral two times a day  dexAMETHasone  IVPB 10 milliGRAM(s) IV Intermittent three times a day  diphenhydrAMINE Injectable 50 milliGRAM(s) IV Push every 12 hours  famotidine Injectable 20 milliGRAM(s) IV Push every 12 hours  folic acid 1 milliGRAM(s) Oral daily  gabapentin 300 milliGRAM(s) Oral three times a day  ipratropium 17 MICROgram(s) HFA Inhaler 2 Puff(s) Inhalation <User Schedule>  lactobacillus acidophilus 1 Tablet(s) Oral two times a day with meals  lidocaine   4% Patch 1 Patch Transdermal daily  melatonin 6 milliGRAM(s) Oral at bedtime  metoprolol tartrate 12.5 milliGRAM(s) Oral two times a day  multivitamin 1 Tablet(s) Oral daily  pantoprazole    Tablet 40 milliGRAM(s) Oral before breakfast  polyethylene glycol 3350 17 Gram(s) Oral daily  psyllium Powder 1 Packet(s) Oral daily  senna 2 Tablet(s) Oral at bedtime  sodium chloride 0.65% Nasal 1 Spray(s) Both Nostrils three times a day  zinc oxide 40% Paste 1 Application(s) Topical two times a day    MEDICATIONS  (PRN):  ALPRAZolam 0.25 milliGRAM(s) Oral every 6 hours PRN Anxiety  ammonium lactate 12% Lotion 1 Application(s) Topical every 12 hours PRN BL feet dryness  baclofen 5 milliGRAM(s) Oral every 8 hours PRN Musculoskeletal Pain  benzonatate 100 milliGRAM(s) Oral three times a day PRN Cough  bisacodyl 5 milliGRAM(s) Oral daily PRN Constipation  hydrocortisone hemorrhoidal Suppository 1 Suppository(s) Rectal two times a day PRN Hemorrhoids  loperamide 2 milliGRAM(s) Oral three times a day PRN Diarrhea      LABS:                        9.5    8.95  )-----------( 319      ( 02 Jan 2024 06:39 )             31.8     Hgb Trend: 9.5<--, 10.2<--, 10.7<--  01-02    141  |  105  |  17  ----------------------------<  138<H>  3.6   |  26  |  0.52    Ca    9.7      02 Jan 2024 06:39  Phos  3.8     01-02  Mg     2.20     01-02      Creatinine Trend: 0.52<--, 0.58<--, 0.65<--, 0.56<--, 0.80<--, 0.70<--    Urinalysis Basic - ( 02 Jan 2024 06:39 )    Color: x / Appearance: x / SG: x / pH: x  Gluc: 138 mg/dL / Ketone: x  / Bili: x / Urobili: x   Blood: x / Protein: x / Nitrite: x   Leuk Esterase: x / RBC: x / WBC x   Sq Epi: x / Non Sq Epi: x / Bacteria: x        Venous Blood Gas:  12-31 @ 20:36  7.43/46/45/30/67.4  VBG Lactate: 1.1    MICROBIOLOGY:     RADIOLOGY & EKG:  [x ] Reviewed and interpreted by me   Interval Events: Patient was seen and examined at bedside.    Feeling better, able to phonate improved Now able to swallow pills.   No cough, fevers, chills, sob.   Patient state she has undiagnosed JACEY. Sleep better on her side.     REVIEW OF SYSTEMS:  Constitutional: [- ] fevers [- ] chills [ -] weight loss [ -] weight gain  CV: [- ] chest pain [- ] orthopnea [ -] palpitations [- ] murmur  Resp: [- ] cough [- ] shortness of breath [- ] dyspnea [- ] wheezing [ -] sputum [ ] hemoptysis  [x ] All other systems negative  [ ] Unable to assess ROS because ________    OBJECTIVE:  ICU Vital Signs Last 24 Hrs  T(C): 36.7 (02 Jan 2024 09:44), Max: 37.1 (01 Jan 2024 14:20)  T(F): 98.1 (02 Jan 2024 09:44), Max: 98.7 (01 Jan 2024 14:20)  HR: 84 (02 Jan 2024 09:44) (70 - 93)  BP: 109/56 (02 Jan 2024 09:44) (104/58 - 131/39)  BP(mean): --  ABP: --  ABP(mean): --  RR: 18 (02 Jan 2024 09:44) (16 - 18)  SpO2: 95% (02 Jan 2024 09:44) (94% - 100%)    O2 Parameters below as of 02 Jan 2024 09:44  Patient On (Oxygen Delivery Method): room air    CAPILLARY BLOOD GLUCOSE  PHYSICAL EXAM:    Constitutional: well-developed; well-groomed; well-nourished; no distress,  Eyes: PERRL; EOMI  ENMT: Normal oropharnxy, no oral lesions, no erythema, no exudates  Neck:  Supple; no JVD; normal thyroid glan  Respiratory: airway patent; breath sounds equal; good air movement, no wheezing, no crackles, no rhonchi. no increase in WOB. No stridor.   Cardiovascular: regular rate and rhythm  no rub , no murmur, no gallops.   Gastrointestinal: soft; no distention, normal BS, no TTP, no organomegaly, no ascites.  Extremities: no clubbing; no cyanosis; no pedal edema, non-tender to palpation, DP and Radial pulses intact.  Neurological: alert and oriented x 3;   Skin: warm and dry; color normal: no rash: no ulcers  Psychiatric: Calm, no SI/HI        HOSPITAL MEDICATIONS:  MEDICATIONS  (STANDING):  apixaban 5 milliGRAM(s) Oral every 12 hours  artificial  tears Solution 1 Drop(s) Both EYES every 12 hours  ascorbic acid 500 milliGRAM(s) Oral daily  atovaquone  Suspension 1500 milliGRAM(s) Oral daily  benzocaine/menthol Lozenge 1 Lozenge Oral two times a day  celecoxib 100 milliGRAM(s) Oral two times a day  dexAMETHasone  IVPB 10 milliGRAM(s) IV Intermittent three times a day  diphenhydrAMINE Injectable 50 milliGRAM(s) IV Push every 12 hours  famotidine Injectable 20 milliGRAM(s) IV Push every 12 hours  folic acid 1 milliGRAM(s) Oral daily  gabapentin 300 milliGRAM(s) Oral three times a day  ipratropium 17 MICROgram(s) HFA Inhaler 2 Puff(s) Inhalation <User Schedule>  lactobacillus acidophilus 1 Tablet(s) Oral two times a day with meals  lidocaine   4% Patch 1 Patch Transdermal daily  melatonin 6 milliGRAM(s) Oral at bedtime  metoprolol tartrate 12.5 milliGRAM(s) Oral two times a day  multivitamin 1 Tablet(s) Oral daily  pantoprazole    Tablet 40 milliGRAM(s) Oral before breakfast  polyethylene glycol 3350 17 Gram(s) Oral daily  psyllium Powder 1 Packet(s) Oral daily  senna 2 Tablet(s) Oral at bedtime  sodium chloride 0.65% Nasal 1 Spray(s) Both Nostrils three times a day  zinc oxide 40% Paste 1 Application(s) Topical two times a day    MEDICATIONS  (PRN):  ALPRAZolam 0.25 milliGRAM(s) Oral every 6 hours PRN Anxiety  ammonium lactate 12% Lotion 1 Application(s) Topical every 12 hours PRN BL feet dryness  baclofen 5 milliGRAM(s) Oral every 8 hours PRN Musculoskeletal Pain  benzonatate 100 milliGRAM(s) Oral three times a day PRN Cough  bisacodyl 5 milliGRAM(s) Oral daily PRN Constipation  hydrocortisone hemorrhoidal Suppository 1 Suppository(s) Rectal two times a day PRN Hemorrhoids  loperamide 2 milliGRAM(s) Oral three times a day PRN Diarrhea      LABS:                        9.5    8.95  )-----------( 319      ( 02 Jan 2024 06:39 )             31.8     Hgb Trend: 9.5<--, 10.2<--, 10.7<--  01-02    141  |  105  |  17  ----------------------------<  138<H>  3.6   |  26  |  0.52    Ca    9.7      02 Jan 2024 06:39  Phos  3.8     01-02  Mg     2.20     01-02      Creatinine Trend: 0.52<--, 0.58<--, 0.65<--, 0.56<--, 0.80<--, 0.70<--    Urinalysis Basic - ( 02 Jan 2024 06:39 )    Color: x / Appearance: x / SG: x / pH: x  Gluc: 138 mg/dL / Ketone: x  / Bili: x / Urobili: x   Blood: x / Protein: x / Nitrite: x   Leuk Esterase: x / RBC: x / WBC x   Sq Epi: x / Non Sq Epi: x / Bacteria: x        Venous Blood Gas:  12-31 @ 20:36  7.43/46/45/30/67.4  VBG Lactate: 1.1    MICROBIOLOGY:     RADIOLOGY & EKG:  [x ] Reviewed and interpreted by me   Interval Events: Patient was seen and examined at bedside.    Feeling better, able to phonate improved Now able to swallow pills.   No cough, fevers, chills, sob.   Patient state she has undiagnosed JACEY. Sleep better on her side.     REVIEW OF SYSTEMS:  Constitutional: [- ] fevers [- ] chills [ -] weight loss [ -] weight gain  CV: [- ] chest pain [- ] orthopnea [ -] palpitations [- ] murmur  Resp: [- ] cough [- ] shortness of breath [- ] dyspnea [- ] wheezing [ -] sputum [ ] hemoptysis  [x ] All other systems negative  [ ] Unable to assess ROS because ________    OBJECTIVE:  ICU Vital Signs Last 24 Hrs  T(C): 36.7 (02 Jan 2024 09:44), Max: 37.1 (01 Jan 2024 14:20)  T(F): 98.1 (02 Jan 2024 09:44), Max: 98.7 (01 Jan 2024 14:20)  HR: 84 (02 Jan 2024 09:44) (70 - 93)  BP: 109/56 (02 Jan 2024 09:44) (104/58 - 131/39)  BP(mean): --  ABP: --  ABP(mean): --  RR: 18 (02 Jan 2024 09:44) (16 - 18)  SpO2: 95% (02 Jan 2024 09:44) (94% - 100%)    O2 Parameters below as of 02 Jan 2024 09:44  Patient On (Oxygen Delivery Method): room air    CAPILLARY BLOOD GLUCOSE  PHYSICAL EXAM:    Constitutional: well-developed; well-groomed; well-nourished; no distress,  Eyes: PERRL; EOMI  ENMT: Normal oropharnxy, no oral lesions, no erythema, no exudates  Neck:  Supple; no JVD; normal thyroid glan  Respiratory: airway patent; breath sounds equal; good air movement, no wheezing, no crackles, no rhonchi. no increase in WOB. No stridor.   Cardiovascular: regular rate and rhythm  no rub , no murmur, no gallops.   Gastrointestinal: soft; no distention, normal BS, no TTP, no organomegaly, no ascites.  Extremities: no clubbing; no cyanosis;  +1 pedal edema (chronic), non-tender to palpation, DP and Radial pulses intact.  Neurological: alert and oriented x 3;   Skin: warm and dry; color normal: no rash: no ulcers  Psychiatric: Calm, no SI/HI        HOSPITAL MEDICATIONS:  MEDICATIONS  (STANDING):  apixaban 5 milliGRAM(s) Oral every 12 hours  artificial  tears Solution 1 Drop(s) Both EYES every 12 hours  ascorbic acid 500 milliGRAM(s) Oral daily  atovaquone  Suspension 1500 milliGRAM(s) Oral daily  benzocaine/menthol Lozenge 1 Lozenge Oral two times a day  celecoxib 100 milliGRAM(s) Oral two times a day  dexAMETHasone  IVPB 10 milliGRAM(s) IV Intermittent three times a day  diphenhydrAMINE Injectable 50 milliGRAM(s) IV Push every 12 hours  famotidine Injectable 20 milliGRAM(s) IV Push every 12 hours  folic acid 1 milliGRAM(s) Oral daily  gabapentin 300 milliGRAM(s) Oral three times a day  ipratropium 17 MICROgram(s) HFA Inhaler 2 Puff(s) Inhalation <User Schedule>  lactobacillus acidophilus 1 Tablet(s) Oral two times a day with meals  lidocaine   4% Patch 1 Patch Transdermal daily  melatonin 6 milliGRAM(s) Oral at bedtime  metoprolol tartrate 12.5 milliGRAM(s) Oral two times a day  multivitamin 1 Tablet(s) Oral daily  pantoprazole    Tablet 40 milliGRAM(s) Oral before breakfast  polyethylene glycol 3350 17 Gram(s) Oral daily  psyllium Powder 1 Packet(s) Oral daily  senna 2 Tablet(s) Oral at bedtime  sodium chloride 0.65% Nasal 1 Spray(s) Both Nostrils three times a day  zinc oxide 40% Paste 1 Application(s) Topical two times a day    MEDICATIONS  (PRN):  ALPRAZolam 0.25 milliGRAM(s) Oral every 6 hours PRN Anxiety  ammonium lactate 12% Lotion 1 Application(s) Topical every 12 hours PRN BL feet dryness  baclofen 5 milliGRAM(s) Oral every 8 hours PRN Musculoskeletal Pain  benzonatate 100 milliGRAM(s) Oral three times a day PRN Cough  bisacodyl 5 milliGRAM(s) Oral daily PRN Constipation  hydrocortisone hemorrhoidal Suppository 1 Suppository(s) Rectal two times a day PRN Hemorrhoids  loperamide 2 milliGRAM(s) Oral three times a day PRN Diarrhea      LABS:                        9.5    8.95  )-----------( 319      ( 02 Jan 2024 06:39 )             31.8     Hgb Trend: 9.5<--, 10.2<--, 10.7<--  01-02    141  |  105  |  17  ----------------------------<  138<H>  3.6   |  26  |  0.52    Ca    9.7      02 Jan 2024 06:39  Phos  3.8     01-02  Mg     2.20     01-02      Creatinine Trend: 0.52<--, 0.58<--, 0.65<--, 0.56<--, 0.80<--, 0.70<--    Urinalysis Basic - ( 02 Jan 2024 06:39 )    Color: x / Appearance: x / SG: x / pH: x  Gluc: 138 mg/dL / Ketone: x  / Bili: x / Urobili: x   Blood: x / Protein: x / Nitrite: x   Leuk Esterase: x / RBC: x / WBC x   Sq Epi: x / Non Sq Epi: x / Bacteria: x        Venous Blood Gas:  12-31 @ 20:36  7.43/46/45/30/67.4  VBG Lactate: 1.1    MICROBIOLOGY:     RADIOLOGY & EKG:  [x ] Reviewed and interpreted by me

## 2024-01-02 NOTE — PROGRESS NOTE ADULT - PROBLEM SELECTOR PLAN 4
10.7 -  stable from prior presentation. Mildly microcytic  - CTM CBC  - B12 732, total iron 27, TIBC 345, % sat 8, folate 20

## 2024-01-02 NOTE — PHYSICAL THERAPY INITIAL EVALUATION ADULT - PERTINENT HX OF CURRENT PROBLEM, REHAB EVAL
Ms. Hager is a  63 Year old woman who [facility]ness of breath, cough and difficulty tolerating secretions x1-2 days, found to have laryngeal edema. Ms. Hager is a  63 Year old woman [facility]ness [facility] breath, cough and difficulty tolerating secretions x1-2 days, found to have laryngeal edema.

## 2024-01-02 NOTE — PROGRESS NOTE ADULT - PROBLEM SELECTOR PLAN 7
Diet: NPO pending ENT/S&S clearance  DVT: Lovenox as above  Dispo: Pending PT, medical course Diet: regular  DVT: eliquis  Dispo: Pending PT, medical course Diet: regular  DVT: eliquis  Dispo: PT rec home PT

## 2024-01-02 NOTE — PROGRESS NOTE ADULT - ATTENDING COMMENTS
62W hypercoagulable state secondary to chronic atrial fibrillation (on apixaban), h/o ILD admitted with angioedema of unclear etiology. Only new medication is celecoxib. Pulmonary and ENT following. Continuing dexamethasone. Will need to resume pre-existing taper once stable for dc. 62W hypercoagulable state secondary to chronic atrial fibrillation (on apixaban), h/o ILD admitted with angioedema of unclear etiology. Only new medication is celecoxib. Pulmonary and ENT following. Continuing dexamethasone. Will need to resume pre-existing taper once stable for dc.    I examined this patient and discussed their management with house staff on January 2, 2024.

## 2024-01-02 NOTE — PHYSICAL THERAPY INITIAL EVALUATION ADULT - ADDITIONAL COMMENTS
[facility] few weeks ago. patient states she has been staying at [facility] until she gets stuff together to go down to [place]. patient has been using a walker and getting assist with ADL. patient has 24/7 home health aide. [facility], [facility] few weeks ago. patient states she has been staying at [facility] until she gets stuff together to go down to [place]. patient has been using a walker and getting assist with ADL. patient has 24/7 home health aide.

## 2024-01-02 NOTE — PROGRESS NOTE ADULT - PROBLEM SELECTOR PLAN 6
Says she does not require pain medication at this time  - Lidocaine PRN ordered  - Can consider toradol or ofirmev if needed for pain relief - patient on baclofen and gabapentin at home but cannot tolerate PO - home medication regimen ordered

## 2024-01-03 ENCOUNTER — RESULT CHARGE (OUTPATIENT)
Age: 64
End: 2024-01-03

## 2024-01-03 DIAGNOSIS — R06.02 SHORTNESS OF BREATH: ICD-10-CM

## 2024-01-03 DIAGNOSIS — Z20.822 CONTACT WITH AND (SUSPECTED) EXPOSURE TO COVID-19: ICD-10-CM

## 2024-01-03 DIAGNOSIS — R06.2 WHEEZING: ICD-10-CM

## 2024-01-03 DIAGNOSIS — R13.10 DYSPHAGIA, UNSPECIFIED: ICD-10-CM

## 2024-01-03 PROBLEM — I82.409 ACUTE EMBOLISM AND THROMBOSIS OF UNSPECIFIED DEEP VEINS OF UNSPECIFIED LOWER EXTREMITY: Chronic | Status: ACTIVE | Noted: 2024-01-01

## 2024-01-03 LAB
ANION GAP SERPL CALC-SCNC: 9 MMOL/L — SIGNIFICANT CHANGE UP (ref 7–14)
ANION GAP SERPL CALC-SCNC: 9 MMOL/L — SIGNIFICANT CHANGE UP (ref 7–14)
BUN SERPL-MCNC: 23 MG/DL — SIGNIFICANT CHANGE UP (ref 7–23)
BUN SERPL-MCNC: 23 MG/DL — SIGNIFICANT CHANGE UP (ref 7–23)
C3 SERPL-MCNC: 135 MG/DL — SIGNIFICANT CHANGE UP (ref 90–180)
C3 SERPL-MCNC: 135 MG/DL — SIGNIFICANT CHANGE UP (ref 90–180)
C4 SERPL-MCNC: 29 MG/DL — SIGNIFICANT CHANGE UP (ref 10–40)
C4 SERPL-MCNC: 29 MG/DL — SIGNIFICANT CHANGE UP (ref 10–40)
CALCIUM SERPL-MCNC: 9.7 MG/DL — SIGNIFICANT CHANGE UP (ref 8.4–10.5)
CALCIUM SERPL-MCNC: 9.7 MG/DL — SIGNIFICANT CHANGE UP (ref 8.4–10.5)
CHLORIDE SERPL-SCNC: 106 MMOL/L — SIGNIFICANT CHANGE UP (ref 98–107)
CHLORIDE SERPL-SCNC: 106 MMOL/L — SIGNIFICANT CHANGE UP (ref 98–107)
CO2 SERPL-SCNC: 26 MMOL/L — SIGNIFICANT CHANGE UP (ref 22–31)
CO2 SERPL-SCNC: 26 MMOL/L — SIGNIFICANT CHANGE UP (ref 22–31)
CREAT SERPL-MCNC: 0.62 MG/DL — SIGNIFICANT CHANGE UP (ref 0.5–1.3)
CREAT SERPL-MCNC: 0.62 MG/DL — SIGNIFICANT CHANGE UP (ref 0.5–1.3)
EGFR: 99 ML/MIN/1.73M2 — SIGNIFICANT CHANGE UP
EGFR: 99 ML/MIN/1.73M2 — SIGNIFICANT CHANGE UP
GLUCOSE SERPL-MCNC: 152 MG/DL — HIGH (ref 70–99)
GLUCOSE SERPL-MCNC: 152 MG/DL — HIGH (ref 70–99)
HCT VFR BLD CALC: 30.5 % — LOW (ref 34.5–45)
HCT VFR BLD CALC: 30.5 % — LOW (ref 34.5–45)
HGB BLD-MCNC: 9.3 G/DL — LOW (ref 11.5–15.5)
HGB BLD-MCNC: 9.3 G/DL — LOW (ref 11.5–15.5)
MAGNESIUM SERPL-MCNC: 2.3 MG/DL — SIGNIFICANT CHANGE UP (ref 1.6–2.6)
MAGNESIUM SERPL-MCNC: 2.3 MG/DL — SIGNIFICANT CHANGE UP (ref 1.6–2.6)
MCHC RBC-ENTMCNC: 24.3 PG — LOW (ref 27–34)
MCHC RBC-ENTMCNC: 24.3 PG — LOW (ref 27–34)
MCHC RBC-ENTMCNC: 30.5 GM/DL — LOW (ref 32–36)
MCHC RBC-ENTMCNC: 30.5 GM/DL — LOW (ref 32–36)
MCV RBC AUTO: 79.6 FL — LOW (ref 80–100)
MCV RBC AUTO: 79.6 FL — LOW (ref 80–100)
NRBC # BLD: 0 /100 WBCS — SIGNIFICANT CHANGE UP (ref 0–0)
NRBC # BLD: 0 /100 WBCS — SIGNIFICANT CHANGE UP (ref 0–0)
NRBC # FLD: 0 K/UL — SIGNIFICANT CHANGE UP (ref 0–0)
NRBC # FLD: 0 K/UL — SIGNIFICANT CHANGE UP (ref 0–0)
PHOSPHATE SERPL-MCNC: 3.3 MG/DL — SIGNIFICANT CHANGE UP (ref 2.5–4.5)
PHOSPHATE SERPL-MCNC: 3.3 MG/DL — SIGNIFICANT CHANGE UP (ref 2.5–4.5)
PLATELET # BLD AUTO: 297 K/UL — SIGNIFICANT CHANGE UP (ref 150–400)
PLATELET # BLD AUTO: 297 K/UL — SIGNIFICANT CHANGE UP (ref 150–400)
POTASSIUM SERPL-MCNC: 4.1 MMOL/L — SIGNIFICANT CHANGE UP (ref 3.5–5.3)
POTASSIUM SERPL-MCNC: 4.1 MMOL/L — SIGNIFICANT CHANGE UP (ref 3.5–5.3)
POTASSIUM SERPL-SCNC: 4.1 MMOL/L — SIGNIFICANT CHANGE UP (ref 3.5–5.3)
POTASSIUM SERPL-SCNC: 4.1 MMOL/L — SIGNIFICANT CHANGE UP (ref 3.5–5.3)
RBC # BLD: 3.83 M/UL — SIGNIFICANT CHANGE UP (ref 3.8–5.2)
RBC # BLD: 3.83 M/UL — SIGNIFICANT CHANGE UP (ref 3.8–5.2)
RBC # FLD: 16.2 % — HIGH (ref 10.3–14.5)
RBC # FLD: 16.2 % — HIGH (ref 10.3–14.5)
SODIUM SERPL-SCNC: 141 MMOL/L — SIGNIFICANT CHANGE UP (ref 135–145)
SODIUM SERPL-SCNC: 141 MMOL/L — SIGNIFICANT CHANGE UP (ref 135–145)
WBC # BLD: 9.11 K/UL — SIGNIFICANT CHANGE UP (ref 3.8–10.5)
WBC # BLD: 9.11 K/UL — SIGNIFICANT CHANGE UP (ref 3.8–10.5)
WBC # FLD AUTO: 9.11 K/UL — SIGNIFICANT CHANGE UP (ref 3.8–10.5)
WBC # FLD AUTO: 9.11 K/UL — SIGNIFICANT CHANGE UP (ref 3.8–10.5)

## 2024-01-03 PROCEDURE — 99232 SBSQ HOSP IP/OBS MODERATE 35: CPT | Mod: GC

## 2024-01-03 PROCEDURE — 99233 SBSQ HOSP IP/OBS HIGH 50: CPT

## 2024-01-03 RX ORDER — TRAMADOL HYDROCHLORIDE 50 MG/1
25 TABLET ORAL EVERY 6 HOURS
Refills: 0 | Status: DISCONTINUED | OUTPATIENT
Start: 2024-01-03 | End: 2024-01-04

## 2024-01-03 RX ORDER — POLYETHYLENE GLYCOL 3350 17 G/17G
17 POWDER, FOR SOLUTION ORAL
Qty: 0 | Refills: 0 | DISCHARGE
Start: 2024-01-03

## 2024-01-03 RX ORDER — DEXAMETHASONE 0.5 MG/5ML
10 ELIXIR ORAL THREE TIMES A DAY
Refills: 0 | Status: DISCONTINUED | OUTPATIENT
Start: 2024-01-03 | End: 2024-01-03

## 2024-01-03 RX ORDER — TRAMADOL HYDROCHLORIDE 50 MG/1
0.5 TABLET ORAL
Qty: 14 | Refills: 0
Start: 2024-01-03 | End: 2024-01-09

## 2024-01-03 RX ORDER — EPINEPHRINE 0.3 MG/.3ML
0.3 INJECTION INTRAMUSCULAR; SUBCUTANEOUS
Qty: 1 | Refills: 0
Start: 2024-01-03 | End: 2024-01-03

## 2024-01-03 RX ORDER — PSYLLIUM SEED (WITH DEXTROSE)
1 POWDER (GRAM) ORAL
Qty: 0 | Refills: 0 | DISCHARGE
Start: 2024-01-03

## 2024-01-03 RX ADMIN — TRAMADOL HYDROCHLORIDE 25 MILLIGRAM(S): 50 TABLET ORAL at 15:01

## 2024-01-03 RX ADMIN — PANTOPRAZOLE SODIUM 40 MILLIGRAM(S): 20 TABLET, DELAYED RELEASE ORAL at 05:56

## 2024-01-03 RX ADMIN — Medication 1 TABLET(S): at 18:02

## 2024-01-03 RX ADMIN — LIDOCAINE 3 PATCH: 4 CREAM TOPICAL at 13:26

## 2024-01-03 RX ADMIN — Medication 1 DROP(S): at 05:52

## 2024-01-03 RX ADMIN — Medication 102 MILLIGRAM(S): at 06:49

## 2024-01-03 RX ADMIN — Medication 2 PUFF(S): at 05:59

## 2024-01-03 RX ADMIN — GABAPENTIN 300 MILLIGRAM(S): 400 CAPSULE ORAL at 05:56

## 2024-01-03 RX ADMIN — APIXABAN 5 MILLIGRAM(S): 2.5 TABLET, FILM COATED ORAL at 06:00

## 2024-01-03 RX ADMIN — TRAMADOL HYDROCHLORIDE 25 MILLIGRAM(S): 50 TABLET ORAL at 14:13

## 2024-01-03 RX ADMIN — APIXABAN 5 MILLIGRAM(S): 2.5 TABLET, FILM COATED ORAL at 18:04

## 2024-01-03 RX ADMIN — GABAPENTIN 300 MILLIGRAM(S): 400 CAPSULE ORAL at 14:13

## 2024-01-03 RX ADMIN — Medication 1 TABLET(S): at 06:11

## 2024-01-03 RX ADMIN — Medication 5 MILLIGRAM(S): at 13:25

## 2024-01-03 RX ADMIN — Medication 102 MILLIGRAM(S): at 05:52

## 2024-01-03 RX ADMIN — LIDOCAINE 3 PATCH: 4 CREAM TOPICAL at 08:01

## 2024-01-03 RX ADMIN — LIDOCAINE 3 PATCH: 4 CREAM TOPICAL at 07:28

## 2024-01-03 RX ADMIN — GABAPENTIN 300 MILLIGRAM(S): 400 CAPSULE ORAL at 22:16

## 2024-01-03 RX ADMIN — CELECOXIB 100 MILLIGRAM(S): 200 CAPSULE ORAL at 05:57

## 2024-01-03 RX ADMIN — LIDOCAINE 3 PATCH: 4 CREAM TOPICAL at 19:09

## 2024-01-03 RX ADMIN — CELECOXIB 100 MILLIGRAM(S): 200 CAPSULE ORAL at 06:59

## 2024-01-03 RX ADMIN — ATOVAQUONE 1500 MILLIGRAM(S): 750 SUSPENSION ORAL at 22:49

## 2024-01-03 RX ADMIN — Medication 12.5 MILLIGRAM(S): at 06:10

## 2024-01-03 RX ADMIN — Medication 12.5 MILLIGRAM(S): at 18:02

## 2024-01-03 RX ADMIN — Medication 2 PUFF(S): at 18:09

## 2024-01-03 NOTE — PROGRESS NOTE ADULT - ASSESSMENT
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 in the setting of ILD exacerbation? with initial steroid response c/b progressive hypoxemic and hypercapnic respiratory failure requiring intubation and VV- ECMO on 9/13 in the setting of steroid taper, then transferred to [facility] with differential at that time considering DAH vs ILD flare. She was decannulated from VV-ECMO 9/21, extubated 9/22. She received pulse steroids, PLEX (9/15, 9/18, 9/20) and Rituximab (9/16 & 10/3). Her course c/b severe leukopenia s/p filgastrim, shock liver with slow to resolved hyperbilirubinemia, RIJ DVT, oropharyngeal dysphagia, and recurrent SVT.   She clinically improved and was discharged to  rehab.     She present with worsening dysphagia, and odynophagia.   Her chest imaging was largely unchanged from 12/5 and significantly improved from months prior in 9/2023.   Since we saw her ENT has scoped the patient and noted edematous arytenoids concerning for angioedema. Unclear etiology.   She has been on high dose steroids taper. Now down to 20mg daily with a taper plan over the several weeks.  Work up so far with negative strep, RVP, CT signs of known ILD. Has been on dexamethasone with improvement. Pending repeat scope by ENT.   Unclear trigger, new medication is Celecoxib.    # ILD  # Angioedema  # MCTD  # JACEY  - ILD is stable on imaging. On steroids taper as outpatient currently s/p high dose decadron.  - Can transition to PO steroids tomorrow. S/P IV dose today. Monitor for worsening. Per ENT can d/c angioedema cocktail.   - Start 32 mg of methylprednisolone starting tomorrow. Can taper off by 4mg every 5 days until complete.   - Will need to maintain PCP ppx with mepron during taper.  - D/W patient, given a possibility celecoxib maybe etiology for angioedema would stop at this time. Less likely given patient tolerated other NSAIDS in the past.   - For pain can try lowest dose of tramadol PRN for severe pain. Avoid nocturnal use given likely JACEY.   - Will need outpatient PSG for likely JACEY.  - Is traveling to [place] on Friday. Will has pulm follow up in [place], Will also need AI, Sleep medicine and Rheum follow up.   - f/u angioedema work up.  - Patient has follow up with Dr. Myles tomorrow at 1:45. If patient is stable for discharge tomorrow please ensure patient is able to make it to her appointment.   - Pulmonary will follow.     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 in the setting of ILD exacerbation? with initial steroid response c/b progressive hypoxemic and hypercapnic respiratory failure requiring intubation and VV- ECMO on 9/13 in the setting of steroid taper, then transferred to [facility] with differential at that time considering DAH vs ILD flare. She was decannulated from VV-ECMO 9/21, extubated 9/22. She received pulse steroids, PLEX (9/15, 9/18, 9/20) and Rituximab (9/16 & 10/3). Her course c/b severe leukopenia s/p filgastrim, shock liver with slow to resolved hyperbilirubinemia, RIJ DVT, oropharyngeal dysphagia, and recurrent SVT.   She clinically improved and was discharged to  rehab.     She present with worsening dysphagia, and odynophagia.   Her chest imaging was largely unchanged from 12/5 and significantly improved from months prior in 9/2023.   Since we saw her ENT has scoped the patient and noted edematous arytenoids concerning for angioedema. Unclear etiology.   She has been on high dose steroids taper. Now down to 20mg daily with a taper plan over the several weeks.  Work up so far with negative strep, RVP, CT signs of known ILD. Has been on dexamethasone with improvement. Pending repeat scope by ENT.   Unclear trigger, new medication is Celecoxib.    # ILD  # Angioedema  # MCTD  # JACEY  - ILD is stable on imaging. On steroids taper as outpatient currently s/p high dose decadron.  - Can transition to PO steroids tomorrow. S/P IV dose today. Monitor for worsening. Per ENT can d/c angioedema cocktail.   - Start 32 mg of methylprednisolone starting tomorrow. Can taper off by 4mg every 5 days until complete.   - Will need to maintain PCP ppx with mepron during taper.  - D/W patient, given a possibility celecoxib maybe etiology for angioedema would stop at this time. Less likely given patient tolerated other NSAIDS in the past.   - For pain can try lowest dose of tramadol PRN for severe pain. Avoid nocturnal use given likely JACEY.   - Will need outpatient PSG for likely JACEY.  - Is traveling to [place] on Friday. Will has pulm follow up in [place], Will also need AI, Sleep medicine and Rheum follow up.   - f/u angioedema work up.  - Patient has follow up with Dr. Myles tomorrow at 1:45. If patient is stable for discharge tomorrow please ensure patient is able to make it to her appointment.   - Pulmonary will follow.

## 2024-01-03 NOTE — PROGRESS NOTE ADULT - PROBLEM SELECTOR PLAN 1
ENT scope with arytenoid edema  - Angioedema cocktail  -- dexamethasone loading dose given, now continue on dexamethasone 10 q8 (had been on a steroid taper currently on solumedrol 20 at home, so this would represent an increase)  -- diphenhydramine 50 mg IV q12hr  -- famotidine 30 mg IV q12hr-> per pharmacy max daily dose is 20mg   - ESR/CRP elevated, procal negative, RVP negative  - strep negative  - Allergy referral on discharge ENT scope with arytenoid edema  - s/p Angioedema cocktail dc'd on 1/3  -- dexamethasone loading dose given, now continue on dexamethasone 10 q8 (had been on a steroid taper currently on solumedrol 20 at home, so this would represent an increase)  -- diphenhydramine 50 mg IV q12hr  -- famotidine 30 mg IV q12hr-> per pharmacy max daily dose is 20mg   - ESR/CRP elevated, procal negative, RVP negative  - strep negative  - will begin methyl pred taper at 32mg beginning 1/4  - given celexocib is only new medication prior to angioedema episode, will dc and consider tramadol prn  - Allergy referral on discharge  - Pulm appt with Dr. Myles set for 1/4 at 1:45pm

## 2024-01-03 NOTE — PROGRESS NOTE ADULT - ASSESSMENT
Ms. Hager is a  62 YO woman with PMH of pAfib, sciatica, anxiety, ILD, admission 10/25/23-12/20/23 (transfer from [facility] to [facility]) for SOB, during which she required ECMO, plasmapheresis/rituximab, high-dose steroids, ccb platelet transfusions, dysphagia, RIJ DVT, discharged on steroid taper who [facility] with worsening shortness of breath, cough and difficulty tolerating secretions x1-2 days, found to have laryngeal edema.  Ms. Hager is a  64 YO woman with PMH of pAfib, sciatica, anxiety, ILD, admission 10/25/23-12/20/23 (transfer from [facility] to [facility]) for SOB, during which she required ECMO, plasmapheresis/rituximab, high-dose steroids, ccb platelet transfusions, dysphagia, RIJ DVT, discharged on steroid taper [facility] shortness of breath, cough and difficulty tolerating secretions x1-2 days, found to have laryngeal edema.

## 2024-01-03 NOTE — CHART NOTE - NSCHARTNOTEFT_GEN_A_CORE
This report was requested by: Apoorva Cole | Reference #: [id number]    Practitioner Count: 1  Pharmacy Count: 1  Current Opioid Prescriptions: 0  Current Benzodiazepine Prescriptions: 1  Current Stimulant Prescriptions: 0      Patient Demographic Information (PDI)       PDI	First Name	Last Name	Birth Date	Gender	Street Address	City	State	Zip Code  SARA Foley	1960	Female	[address] [id number]    Prescription Information      PDI Filter:    PDI	Current Rx	Drug Type	Rx Written	Rx Dispensed	Drug	Quantity	Days Supply	Prescriber Name	Prescriber LISA #	Payment Method	Dispenser  A	Y	B	12/18/2023	12/20/2023	alprazolam 0.25 mg tablet	56	14	Liliana Lucio	[id number]	Insurance	[employer] [facility] [email or web address] #[id number]    This report was requested by: Apoorva Cole | Reference #: [id number]    Prescriptions Dispensed in [place]  Patient Demographic Information (PDI)       PDI	First Name	Last Name	Birth Date	Gender	Street Address	City	State	Zip Code  SARA FOLEY	1960	Female	[address] [facility]	[address]    Prescription Information      PDI Filter:    PDI	Current Rx	Rx Written	Rx Dispensed	Drug	Strength	Quantity	Days Supply	Prescriber Name	Payment Method	Dispenser  A	N	08/06/2023	08/06/2023	OXYCODONE-ACETAMINOPHEN 5-325		12.0	3	MD HUNTER, JACOB	Insurance	[employer] This report was requested by: Apoorva Cole | Reference #: [id number]    Practitioner Count: 1  Pharmacy Count: 1  Current Opioid Prescriptions: 0  Current Benzodiazepine Prescriptions: 1  Current Stimulant Prescriptions: 0      Patient Demographic Information (PDI)       PDI	First Name	Last Name	Birth Date	Gender	Street Address	City	State	Zip Code  SARA Foley	1960	Female	[address] [id number]    Prescription Information      PDI Filter:    PDI	Current Rx	Drug Type	Rx Written	Rx Dispensed	Drug	Quantity	Days Supply	Prescriber Name	Prescriber LISA #	Payment Method	Dispenser  A	Y	B	12/18/2023	12/20/2023	alprazolam 0.25 mg tablet	56	14	Liliana Lucio	[id number]	Insurance	[employer] #[id number]    This report was requested by: Apoorva Cole | Reference #: [id number]    Prescriptions Dispensed in [place]  Patient Demographic Information (PDI)       PDI	First Name	Last Name	Birth Date	Gender	Street Address	City	State	Zip Code  SARA FOLEY	1960	Female	[address] [facility]	[address]    Prescription Information      PDI Filter:    PDI	Current Rx	Rx Written	Rx Dispensed	Drug	Strength	Quantity	Days Supply	Prescriber Name	Payment Method	Dispenser  A	N	08/06/2023	08/06/2023	OXYCODONE-ACETAMINOPHEN 5-325		12.0	3	MD HUNTER, JACOB	Insurance	[employer]

## 2024-01-03 NOTE — PROGRESS NOTE ADULT - PROBLEM SELECTOR PLAN 3
Crackles in lungs, CT with ILD  - Steroids as above  - Pulm recs appreciated  - Notes she desats during sleep for which she was started on 1L NC but she feels it does not help as she mainly breathes through her mouth. Started on venturi mask

## 2024-01-03 NOTE — PROGRESS NOTE ADULT - ATTENDING COMMENTS
62W hypercoagulable state secondary to chronic atrial fibrillation (on apixaban), h/o ILD admitted with angioedema of unclear etiology. Only new medication is celecoxib. Pulmonary and ENT following. Continuing dexamethasone. Will start 32mg methylpred tomorrow and taper, per pulmonary recommendations.

## 2024-01-03 NOTE — PROGRESS NOTE ADULT - SUBJECTIVE AND OBJECTIVE BOX
PROGRESS NOTE:     Patient is a 63y old  Female who presents with a chief complaint of Inability to tolerate secretions (02 Jan 2024 13:56)      SUBJECTIVE / OVERNIGHT EVENTS:    ADDITIONAL REVIEW OF SYSTEMS: 10 point ROS negative except per HPI    MEDICATIONS  (STANDING):  apixaban 5 milliGRAM(s) Oral every 12 hours  artificial  tears Solution 1 Drop(s) Both EYES every 12 hours  ascorbic acid 500 milliGRAM(s) Oral daily  atovaquone  Suspension 1500 milliGRAM(s) Oral daily  benzocaine/menthol Lozenge 1 Lozenge Oral two times a day  celecoxib 100 milliGRAM(s) Oral two times a day  dexAMETHasone  IVPB 10 milliGRAM(s) IV Intermittent three times a day  diphenhydrAMINE Injectable 50 milliGRAM(s) IV Push every 12 hours  famotidine Injectable 20 milliGRAM(s) IV Push every 12 hours  folic acid 1 milliGRAM(s) Oral daily  gabapentin 300 milliGRAM(s) Oral three times a day  ipratropium 17 MICROgram(s) HFA Inhaler 2 Puff(s) Inhalation <User Schedule>  lactobacillus acidophilus 1 Tablet(s) Oral two times a day with meals  melatonin 6 milliGRAM(s) Oral at bedtime  metoprolol tartrate 12.5 milliGRAM(s) Oral two times a day  multivitamin 1 Tablet(s) Oral daily  pantoprazole    Tablet 40 milliGRAM(s) Oral before breakfast  polyethylene glycol 3350 17 Gram(s) Oral daily  psyllium Powder 1 Packet(s) Oral daily  senna 2 Tablet(s) Oral at bedtime  sodium chloride 0.65% Nasal 1 Spray(s) Both Nostrils three times a day  zinc oxide 40% Paste 1 Application(s) Topical two times a day    MEDICATIONS  (PRN):  ALPRAZolam 0.25 milliGRAM(s) Oral every 6 hours PRN Anxiety  ammonium lactate 12% Lotion 1 Application(s) Topical every 12 hours PRN BL feet dryness  baclofen 5 milliGRAM(s) Oral every 8 hours PRN Musculoskeletal Pain  benzonatate 100 milliGRAM(s) Oral three times a day PRN Cough  bisacodyl 5 milliGRAM(s) Oral daily PRN Constipation  hydrocortisone hemorrhoidal Suppository 1 Suppository(s) Rectal two times a day PRN Hemorrhoids  lidocaine   4% Patch 3 Patch Transdermal daily PRN pain in affected areas  loperamide 2 milliGRAM(s) Oral three times a day PRN Diarrhea      CAPILLARY BLOOD GLUCOSE        I&O's Summary      PHYSICAL EXAM:  Vital Signs Last 24 Hrs  T(C): 36.6 (03 Jan 2024 05:51), Max: 37.3 (02 Jan 2024 22:23)  T(F): 97.8 (03 Jan 2024 05:51), Max: 99.2 (02 Jan 2024 22:23)  HR: 65 (03 Jan 2024 05:51) (65 - 84)  BP: 113/76 (03 Jan 2024 05:51) (109/56 - 137/53)  BP(mean): --  RR: 20 (03 Jan 2024 05:51) (16 - 20)  SpO2: 100% (03 Jan 2024 05:51) (95% - 100%)    Parameters below as of 03 Jan 2024 05:51  Patient On (Oxygen Delivery Method): Venturi Mask        CONSTITUTIONAL: NAD, well-developed, A&Ox3 to person, place, time.  RESPIRATORY: Normal respiratory effort; lungs are clear to auscultation bilaterally  CARDIOVASCULAR: Regular rate and rhythm, normal S1 and S2, no murmur/rub/gallop; No lower extremity edema; Peripheral pulses are 2+ bilaterally  ABDOMEN: Nontender to palpation, no rebound/guarding; No hepatosplenomegaly  MUSCLOSKELETAL: no clubbing or cyanosis of digits; no joint swelling or tenderness to palpation  NEURO: CN 2-12 grossly intact, moves all limbs spontaneously      LABS:                          9.3    9.11  )-----------( 297      ( 03 Jan 2024 05:50 )             30.5     01-02    141  |  105  |  17  ----------------------------<  138<H>  3.6   |  26  |  0.52    Ca    9.7      02 Jan 2024 06:39  Phos  3.8     01-02  Mg     2.20     01-02            -----    MICRO  Urinalysis Basic - ( 02 Jan 2024 06:39 )    Color: x / Appearance: x / SG: x / pH: x  Gluc: 138 mg/dL / Ketone: x  / Bili: x / Urobili: x   Blood: x / Protein: x / Nitrite: x   Leuk Esterase: x / RBC: x / WBC x   Sq Epi: x / Non Sq Epi: x / Bacteria: x        -----    TRENDS  Hemoglobin: 9.3 g/dL (01-03 @ 05:50)  Hemoglobin: 9.5 g/dL (01-02 @ 06:39)  Hemoglobin: 10.2 g/dL (01-01 @ 07:35)  Hemoglobin: 10.7 g/dL (12-31 @ 03:00)    Creatinine Trend: 0.52<--, 0.58<--, 0.65<--, 0.56<--, 0.80<--, 0.70<--  ----  RADIOLOGY & ADDITIONAL TESTS:  Results Reviewed:   Imaging Personally Reviewed:  Electrocardiogram Personally Reviewed:    COORDINATION OF CARE:  Care Discussed with Consultants/Other Providers [Y/N]:  Prior or Outpatient Records Reviewed [Y/N]:   PROGRESS NOTE:     Patient is a 63y old  Female who presents with a chief complaint of Inability to tolerate secretions (02 Jan 2024 13:56)      SUBJECTIVE / OVERNIGHT EVENTS: No overnight events. Pt feeling well this morning.    ADDITIONAL REVIEW OF SYSTEMS: 10 point ROS negative except per HPI    MEDICATIONS  (STANDING):  apixaban 5 milliGRAM(s) Oral every 12 hours  artificial  tears Solution 1 Drop(s) Both EYES every 12 hours  ascorbic acid 500 milliGRAM(s) Oral daily  atovaquone  Suspension 1500 milliGRAM(s) Oral daily  benzocaine/menthol Lozenge 1 Lozenge Oral two times a day  celecoxib 100 milliGRAM(s) Oral two times a day  dexAMETHasone  IVPB 10 milliGRAM(s) IV Intermittent three times a day  diphenhydrAMINE Injectable 50 milliGRAM(s) IV Push every 12 hours  famotidine Injectable 20 milliGRAM(s) IV Push every 12 hours  folic acid 1 milliGRAM(s) Oral daily  gabapentin 300 milliGRAM(s) Oral three times a day  ipratropium 17 MICROgram(s) HFA Inhaler 2 Puff(s) Inhalation <User Schedule>  lactobacillus acidophilus 1 Tablet(s) Oral two times a day with meals  melatonin 6 milliGRAM(s) Oral at bedtime  metoprolol tartrate 12.5 milliGRAM(s) Oral two times a day  multivitamin 1 Tablet(s) Oral daily  pantoprazole    Tablet 40 milliGRAM(s) Oral before breakfast  polyethylene glycol 3350 17 Gram(s) Oral daily  psyllium Powder 1 Packet(s) Oral daily  senna 2 Tablet(s) Oral at bedtime  sodium chloride 0.65% Nasal 1 Spray(s) Both Nostrils three times a day  zinc oxide 40% Paste 1 Application(s) Topical two times a day    MEDICATIONS  (PRN):  ALPRAZolam 0.25 milliGRAM(s) Oral every 6 hours PRN Anxiety  ammonium lactate 12% Lotion 1 Application(s) Topical every 12 hours PRN BL feet dryness  baclofen 5 milliGRAM(s) Oral every 8 hours PRN Musculoskeletal Pain  benzonatate 100 milliGRAM(s) Oral three times a day PRN Cough  bisacodyl 5 milliGRAM(s) Oral daily PRN Constipation  hydrocortisone hemorrhoidal Suppository 1 Suppository(s) Rectal two times a day PRN Hemorrhoids  lidocaine   4% Patch 3 Patch Transdermal daily PRN pain in affected areas  loperamide 2 milliGRAM(s) Oral three times a day PRN Diarrhea      CAPILLARY BLOOD GLUCOSE        I&O's Summary      PHYSICAL EXAM:  Vital Signs Last 24 Hrs  T(C): 36.6 (03 Jan 2024 05:51), Max: 37.3 (02 Jan 2024 22:23)  T(F): 97.8 (03 Jan 2024 05:51), Max: 99.2 (02 Jan 2024 22:23)  HR: 65 (03 Jan 2024 05:51) (65 - 84)  BP: 113/76 (03 Jan 2024 05:51) (109/56 - 137/53)  BP(mean): --  RR: 20 (03 Jan 2024 05:51) (16 - 20)  SpO2: 100% (03 Jan 2024 05:51) (95% - 100%)    Parameters below as of 03 Jan 2024 05:51  Patient On (Oxygen Delivery Method): Venturi Mask        CONSTITUTIONAL: NAD, well-developed, A&Ox3 to person, place, time.  RESPIRATORY: Normal respiratory effort; lungs are clear to auscultation bilaterally; no wheeze or stridor  CARDIOVASCULAR: Regular rate and rhythm, normal S1 and S2, no murmur/rub/gallop; No lower extremity edema; Peripheral pulses are 2+ bilaterally  ABDOMEN: Nontender to palpation, no rebound/guarding; No hepatosplenomegaly  MUSCLOSKELETAL: no clubbing or cyanosis of digits; no joint swelling or tenderness to palpation  NEURO: CN 2-12 grossly intact, moves all limbs spontaneously      LABS:                          9.3    9.11  )-----------( 297      ( 03 Jan 2024 05:50 )             30.5     01-02    141  |  105  |  17  ----------------------------<  138<H>  3.6   |  26  |  0.52    Ca    9.7      02 Jan 2024 06:39  Phos  3.8     01-02  Mg     2.20     01-02            -----    MICRO  Urinalysis Basic - ( 02 Jan 2024 06:39 )    Color: x / Appearance: x / SG: x / pH: x  Gluc: 138 mg/dL / Ketone: x  / Bili: x / Urobili: x   Blood: x / Protein: x / Nitrite: x   Leuk Esterase: x / RBC: x / WBC x   Sq Epi: x / Non Sq Epi: x / Bacteria: x        -----    TRENDS  Hemoglobin: 9.3 g/dL (01-03 @ 05:50)  Hemoglobin: 9.5 g/dL (01-02 @ 06:39)  Hemoglobin: 10.2 g/dL (01-01 @ 07:35)  Hemoglobin: 10.7 g/dL (12-31 @ 03:00)    Creatinine Trend: 0.52<--, 0.58<--, 0.65<--, 0.56<--, 0.80<--, 0.70<--  ----  RADIOLOGY & ADDITIONAL TESTS:  Results Reviewed:   Imaging Personally Reviewed:  Electrocardiogram Personally Reviewed:    COORDINATION OF CARE:  Care Discussed with Consultants/Other Providers [Y/N]:  Prior or Outpatient Records Reviewed [Y/N]:

## 2024-01-03 NOTE — PROGRESS NOTE ADULT - SUBJECTIVE AND OBJECTIVE BOX
Interval Events: Patient was seen and examined at bedside.    Patient doing well, back to baseline phonation and able to swallow well.     REVIEW OF SYSTEMS:  Constitutional: [- ] fevers [ -] chills [- ] weight loss [- ] weight gain  CV: [- ] chest pain [- ] orthopnea [ -] palpitations [ -] murmur  Resp: [- ] cough [ -] shortness of breath [ -] dyspnea [- ] wheezing [- ] sputum [ -] hemoptysis  [x ] All other systems negative  [ ] Unable to assess ROS because ________    OBJECTIVE:  ICU Vital Signs Last 24 Hrs  T(C): 36.2 (03 Jan 2024 09:17), Max: 37.3 (02 Jan 2024 22:23)  T(F): 97.1 (03 Jan 2024 09:17), Max: 99.2 (02 Jan 2024 22:23)  HR: 77 (03 Jan 2024 09:17) (65 - 83)  BP: 126/72 (03 Jan 2024 09:17) (110/57 - 137/53)  BP(mean): --  ABP: --  ABP(mean): --  RR: 18 (03 Jan 2024 09:17) (16 - 20)  SpO2: 95% (03 Jan 2024 09:17) (95% - 100%)    O2 Parameters below as of 03 Jan 2024 09:17  Patient On (Oxygen Delivery Method): room air      CAPILLARY BLOOD GLUCOSE    PHYSICAL EXAM:  Constitutional: well-developed; well-groomed; well-nourished; no distress,  Eyes: PERRL; EOMI  ENMT: Normal oropharnxy, no oral lesions, no erythema, no exudates  Neck:  Supple; no JVD; normal thyroid glan  Respiratory: airway patent; breath sounds equal; good air movement, no wheezing, no crackles, no rhonchi. no increase in WOB. No stridor.   Cardiovascular: regular rate and rhythm  no rub , no murmur, no gallops.   Gastrointestinal: soft; no distention, normal BS, no TTP, no organomegaly, no ascites.  Extremities: no clubbing; no cyanosis; no pedal edema, non-tender to palpation, DP and Radial pulses intact.  Neurological: alert and oriented x 3;   Skin: warm and dry; color normal: no rash: no ulcers  Psychiatric: Calm, no SI/HI        HOSPITAL MEDICATIONS:  MEDICATIONS  (STANDING):  apixaban 5 milliGRAM(s) Oral every 12 hours  artificial  tears Solution 1 Drop(s) Both EYES every 12 hours  ascorbic acid 500 milliGRAM(s) Oral daily  atovaquone  Suspension 1500 milliGRAM(s) Oral daily  benzocaine/menthol Lozenge 1 Lozenge Oral two times a day  folic acid 1 milliGRAM(s) Oral daily  gabapentin 300 milliGRAM(s) Oral three times a day  ipratropium 17 MICROgram(s) HFA Inhaler 2 Puff(s) Inhalation <User Schedule>  lactobacillus acidophilus 1 Tablet(s) Oral two times a day with meals  melatonin 6 milliGRAM(s) Oral at bedtime  metoprolol tartrate 12.5 milliGRAM(s) Oral two times a day  multivitamin 1 Tablet(s) Oral daily  pantoprazole    Tablet 40 milliGRAM(s) Oral before breakfast  polyethylene glycol 3350 17 Gram(s) Oral daily  psyllium Powder 1 Packet(s) Oral daily  senna 2 Tablet(s) Oral at bedtime  sodium chloride 0.65% Nasal 1 Spray(s) Both Nostrils three times a day  zinc oxide 40% Paste 1 Application(s) Topical two times a day    MEDICATIONS  (PRN):  ALPRAZolam 0.25 milliGRAM(s) Oral every 6 hours PRN Anxiety  ammonium lactate 12% Lotion 1 Application(s) Topical every 12 hours PRN BL feet dryness  baclofen 5 milliGRAM(s) Oral every 8 hours PRN Musculoskeletal Pain  benzonatate 100 milliGRAM(s) Oral three times a day PRN Cough  bisacodyl 5 milliGRAM(s) Oral daily PRN Constipation  hydrocortisone hemorrhoidal Suppository 1 Suppository(s) Rectal two times a day PRN Hemorrhoids  lidocaine   4% Patch 3 Patch Transdermal daily PRN pain in affected areas  loperamide 2 milliGRAM(s) Oral three times a day PRN Diarrhea      LABS:                        9.3    9.11  )-----------( 297      ( 03 Jan 2024 05:50 )             30.5     Hgb Trend: 9.3<--, 9.5<--, 10.2<--, 10.7<--  01-03    141  |  106  |  23  ----------------------------<  152<H>  4.1   |  26  |  0.62    Ca    9.7      03 Jan 2024 05:50  Phos  3.3     01-03  Mg     2.30     01-03      Creatinine Trend: 0.62<--, 0.52<--, 0.58<--, 0.65<--, 0.56<--, 0.80<--    Urinalysis Basic - ( 03 Jan 2024 05:50 )    Color: x / Appearance: x / SG: x / pH: x  Gluc: 152 mg/dL / Ketone: x  / Bili: x / Urobili: x   Blood: x / Protein: x / Nitrite: x   Leuk Esterase: x / RBC: x / WBC x   Sq Epi: x / Non Sq Epi: x / Bacteria: x          MICROBIOLOGY:     RADIOLOGY & EKG:  [x ] Reviewed and interpreted by me   Interval Events: Patient was seen and examined at bedside.    Patient doing well, back to baseline phonation and able to swallow well.     REVIEW OF SYSTEMS:  Constitutional: [- ] fevers [ -] chills [- ] weight loss [- ] weight gain  CV: [- ] chest pain [- ] orthopnea [ -] palpitations [ -] murmur  Resp: [- ] cough [ -] shortness of breath [ -] dyspnea [- ] wheezing [- ] sputum [ -] hemoptysis  [x ] All other systems negative  [ ] Unable to assess ROS because ________    OBJECTIVE:  ICU Vital Signs Last 24 Hrs  T(C): 36.2 (03 Jan 2024 09:17), Max: 37.3 (02 Jan 2024 22:23)  T(F): 97.1 (03 Jan 2024 09:17), Max: 99.2 (02 Jan 2024 22:23)  HR: 77 (03 Jan 2024 09:17) (65 - 83)  BP: 126/72 (03 Jan 2024 09:17) (110/57 - 137/53)  BP(mean): --  ABP: --  ABP(mean): --  RR: 18 (03 Jan 2024 09:17) (16 - 20)  SpO2: 95% (03 Jan 2024 09:17) (95% - 100%)    O2 Parameters below as of 03 Jan 2024 09:17  Patient On (Oxygen Delivery Method): room air      CAPILLARY BLOOD GLUCOSE    PHYSICAL EXAM:  Constitutional: well-developed; well-groomed; well-nourished; no distress,  Eyes: PERRL; EOMI  ENMT: Normal oropharnxy, no oral lesions, no erythema, no exudates  Neck:  Supple; no JVD; normal thyroid glan  Respiratory: airway patent; breath sounds equal; good air movement, no wheezing, no crackles, no rhonchi. no increase in WOB. No stridor.   Cardiovascular: regular rate and rhythm  no rub , no murmur, no gallops.   Gastrointestinal: soft; no distention, normal BS, no TTP, no organomegaly, no ascites.  Extremities: no clubbing; no cyanosis;  +1 pedal edema (chronic), non-tender to palpation, DP and Radial pulses intact.  Neurological: alert and oriented x 3;   Skin: warm and dry; color normal: no rash: no ulcers  Psychiatric: Calm, no SI/HI        HOSPITAL MEDICATIONS:  MEDICATIONS  (STANDING):  apixaban 5 milliGRAM(s) Oral every 12 hours  artificial  tears Solution 1 Drop(s) Both EYES every 12 hours  ascorbic acid 500 milliGRAM(s) Oral daily  atovaquone  Suspension 1500 milliGRAM(s) Oral daily  benzocaine/menthol Lozenge 1 Lozenge Oral two times a day  folic acid 1 milliGRAM(s) Oral daily  gabapentin 300 milliGRAM(s) Oral three times a day  ipratropium 17 MICROgram(s) HFA Inhaler 2 Puff(s) Inhalation <User Schedule>  lactobacillus acidophilus 1 Tablet(s) Oral two times a day with meals  melatonin 6 milliGRAM(s) Oral at bedtime  metoprolol tartrate 12.5 milliGRAM(s) Oral two times a day  multivitamin 1 Tablet(s) Oral daily  pantoprazole    Tablet 40 milliGRAM(s) Oral before breakfast  polyethylene glycol 3350 17 Gram(s) Oral daily  psyllium Powder 1 Packet(s) Oral daily  senna 2 Tablet(s) Oral at bedtime  sodium chloride 0.65% Nasal 1 Spray(s) Both Nostrils three times a day  zinc oxide 40% Paste 1 Application(s) Topical two times a day    MEDICATIONS  (PRN):  ALPRAZolam 0.25 milliGRAM(s) Oral every 6 hours PRN Anxiety  ammonium lactate 12% Lotion 1 Application(s) Topical every 12 hours PRN BL feet dryness  baclofen 5 milliGRAM(s) Oral every 8 hours PRN Musculoskeletal Pain  benzonatate 100 milliGRAM(s) Oral three times a day PRN Cough  bisacodyl 5 milliGRAM(s) Oral daily PRN Constipation  hydrocortisone hemorrhoidal Suppository 1 Suppository(s) Rectal two times a day PRN Hemorrhoids  lidocaine   4% Patch 3 Patch Transdermal daily PRN pain in affected areas  loperamide 2 milliGRAM(s) Oral three times a day PRN Diarrhea      LABS:                        9.3    9.11  )-----------( 297      ( 03 Jan 2024 05:50 )             30.5     Hgb Trend: 9.3<--, 9.5<--, 10.2<--, 10.7<--  01-03    141  |  106  |  23  ----------------------------<  152<H>  4.1   |  26  |  0.62    Ca    9.7      03 Jan 2024 05:50  Phos  3.3     01-03  Mg     2.30     01-03      Creatinine Trend: 0.62<--, 0.52<--, 0.58<--, 0.65<--, 0.56<--, 0.80<--    Urinalysis Basic - ( 03 Jan 2024 05:50 )    Color: x / Appearance: x / SG: x / pH: x  Gluc: 152 mg/dL / Ketone: x  / Bili: x / Urobili: x   Blood: x / Protein: x / Nitrite: x   Leuk Esterase: x / RBC: x / WBC x   Sq Epi: x / Non Sq Epi: x / Bacteria: x          MICROBIOLOGY:     RADIOLOGY & EKG:  [x ] Reviewed and interpreted by me

## 2024-01-04 ENCOUNTER — TRANSCRIPTION ENCOUNTER (OUTPATIENT)
Age: 64
End: 2024-01-04

## 2024-01-04 ENCOUNTER — APPOINTMENT (OUTPATIENT)
Dept: PULMONOLOGY | Facility: CLINIC | Age: 64
End: 2024-01-04
Payer: COMMERCIAL

## 2024-01-04 VITALS
BODY MASS INDEX: 40.32 KG/M2 | OXYGEN SATURATION: 94 % | SYSTOLIC BLOOD PRESSURE: 116 MMHG | WEIGHT: 266 LBS | RESPIRATION RATE: 16 BRPM | HEIGHT: 68 IN | HEART RATE: 81 BPM | DIASTOLIC BLOOD PRESSURE: 76 MMHG | TEMPERATURE: 98.2 F

## 2024-01-04 VITALS
SYSTOLIC BLOOD PRESSURE: 100 MMHG | OXYGEN SATURATION: 98 % | HEART RATE: 69 BPM | DIASTOLIC BLOOD PRESSURE: 69 MMHG | RESPIRATION RATE: 18 BRPM | TEMPERATURE: 98 F

## 2024-01-04 DIAGNOSIS — T78.3XXA ANGIONEUROTIC EDEMA, INITIAL ENCOUNTER: ICD-10-CM

## 2024-01-04 DIAGNOSIS — R06.00 DYSPNEA, UNSPECIFIED: ICD-10-CM

## 2024-01-04 DIAGNOSIS — K76.0 FATTY (CHANGE OF) LIVER, NOT ELSEWHERE CLASSIFIED: ICD-10-CM

## 2024-01-04 PROCEDURE — 94010 BREATHING CAPACITY TEST: CPT

## 2024-01-04 PROCEDURE — ZZZZZ: CPT

## 2024-01-04 PROCEDURE — 82803 BLOOD GASES ANY COMBINATION: CPT

## 2024-01-04 PROCEDURE — 94762 N-INVAS EAR/PLS OXIMTRY CONT: CPT | Mod: 59

## 2024-01-04 PROCEDURE — 99239 HOSP IP/OBS DSCHRG MGMT >30: CPT | Mod: GC

## 2024-01-04 PROCEDURE — 99215 OFFICE O/P EST HI 40 MIN: CPT | Mod: 25

## 2024-01-04 PROCEDURE — 36600 WITHDRAWAL OF ARTERIAL BLOOD: CPT | Mod: 59

## 2024-01-04 PROCEDURE — 99232 SBSQ HOSP IP/OBS MODERATE 35: CPT

## 2024-01-04 RX ORDER — GABAPENTIN 300 MG
300 TABLET ORAL
Refills: 0 | Status: ACTIVE | COMMUNITY

## 2024-01-04 RX ORDER — ALPRAZOLAM 0.25 MG/1
0.25 TABLET ORAL
Refills: 0 | Status: DISCONTINUED | COMMUNITY
End: 2024-01-04

## 2024-01-04 RX ORDER — LOPERAMIDE HYDROCHLORIDE 2 MG/1
CAPSULE ORAL
Refills: 0 | Status: ACTIVE | COMMUNITY

## 2024-01-04 RX ORDER — BACLOFEN 5 MG/1
5 TABLET ORAL
Refills: 0 | Status: ACTIVE | COMMUNITY

## 2024-01-04 RX ORDER — ALPRAZOLAM 0.25 MG/1
0.25 TABLET ORAL
Refills: 0 | Status: ACTIVE | COMMUNITY

## 2024-01-04 RX ORDER — TRAMADOL HYDROCHLORIDE 50 MG/1
50 TABLET, COATED ORAL
Qty: 0.5 | Refills: 0 | Status: ACTIVE | COMMUNITY

## 2024-01-04 RX ADMIN — LIDOCAINE 3 PATCH: 4 CREAM TOPICAL at 01:40

## 2024-01-04 RX ADMIN — GABAPENTIN 300 MILLIGRAM(S): 400 CAPSULE ORAL at 06:39

## 2024-01-04 RX ADMIN — Medication 12.5 MILLIGRAM(S): at 06:49

## 2024-01-04 RX ADMIN — Medication 2 PUFF(S): at 06:40

## 2024-01-04 RX ADMIN — Medication 1 DROP(S): at 06:37

## 2024-01-04 RX ADMIN — Medication 32 MILLIGRAM(S): at 06:41

## 2024-01-04 RX ADMIN — Medication 1 TABLET(S): at 06:55

## 2024-01-04 RX ADMIN — PANTOPRAZOLE SODIUM 40 MILLIGRAM(S): 20 TABLET, DELAYED RELEASE ORAL at 06:42

## 2024-01-04 RX ADMIN — APIXABAN 5 MILLIGRAM(S): 2.5 TABLET, FILM COATED ORAL at 06:45

## 2024-01-04 NOTE — DISCHARGE NOTE NURSING/CASE MANAGEMENT/SOCIAL WORK - NSDCPNINST_GEN_ALL_CORE
Notify your PCP  if you experience any further shortness of breath, difficulty swallowing or handling oral secretions.

## 2024-01-04 NOTE — PHYSICAL EXAM
[No Acute Distress] : no acute distress [Normal Sclera/Conjunctiva] : normal sclera/conjunctiva [Normal Outer Ear/Nose] : the outer ears and nose were normal in appearance [No JVD] : no jugular venous distention [Normal Rate] : normal rate  [No Carotid Bruits] : no carotid bruits [Soft] : abdomen soft [No CVA Tenderness] : no CVA  tenderness [No Rash] : no rash [Normal] : affect was normal and insight and judgment were intact [de-identified] : sitting in wheelchair [de-identified] : decreased entry at bases- - - - [de-identified] : in wheel chair  --ambulation not checked- - - -

## 2024-01-04 NOTE — REVIEW OF SYSTEMS
[Vision Problems] : vision problems [Shortness Of Breath] : shortness of breath [Fever] : no fever [Earache] : no earache [Chest Pain] : no chest pain [Abdominal Pain] : no abdominal pain [Joint Pain] : no joint pain [Itching] : no itching [Headache] : no headache

## 2024-01-04 NOTE — PROGRESS NOTE ADULT - SUBJECTIVE AND OBJECTIVE BOX
PROGRESS NOTE:     Patient is a 63y old  Female who presents with a chief complaint of Inability to tolerate secretions (03 Jan 2024 12:03)      SUBJECTIVE / OVERNIGHT EVENTS:    ADDITIONAL REVIEW OF SYSTEMS: 10 point ROS negative except per HPI    MEDICATIONS  (STANDING):  apixaban 5 milliGRAM(s) Oral every 12 hours  artificial  tears Solution 1 Drop(s) Both EYES every 12 hours  ascorbic acid 500 milliGRAM(s) Oral daily  atovaquone  Suspension 1500 milliGRAM(s) Oral daily  benzocaine/menthol Lozenge 1 Lozenge Oral two times a day  folic acid 1 milliGRAM(s) Oral daily  gabapentin 300 milliGRAM(s) Oral three times a day  ipratropium 17 MICROgram(s) HFA Inhaler 2 Puff(s) Inhalation <User Schedule>  lactobacillus acidophilus 1 Tablet(s) Oral two times a day with meals  melatonin 6 milliGRAM(s) Oral at bedtime  metoprolol tartrate 12.5 milliGRAM(s) Oral two times a day  multivitamin 1 Tablet(s) Oral daily  pantoprazole    Tablet 40 milliGRAM(s) Oral before breakfast  polyethylene glycol 3350 17 Gram(s) Oral daily  psyllium Powder 1 Packet(s) Oral daily  senna 2 Tablet(s) Oral at bedtime  sodium chloride 0.65% Nasal 1 Spray(s) Both Nostrils three times a day  zinc oxide 40% Paste 1 Application(s) Topical two times a day    MEDICATIONS  (PRN):  ALPRAZolam 0.25 milliGRAM(s) Oral every 6 hours PRN Anxiety  ammonium lactate 12% Lotion 1 Application(s) Topical every 12 hours PRN BL feet dryness  baclofen 5 milliGRAM(s) Oral every 8 hours PRN Musculoskeletal Pain  benzonatate 100 milliGRAM(s) Oral three times a day PRN Cough  bisacodyl 5 milliGRAM(s) Oral daily PRN Constipation  hydrocortisone hemorrhoidal Suppository 1 Suppository(s) Rectal two times a day PRN Hemorrhoids  lidocaine   4% Patch 3 Patch Transdermal daily PRN pain in affected areas  loperamide 2 milliGRAM(s) Oral three times a day PRN Diarrhea  traMADol 25 milliGRAM(s) Oral every 6 hours PRN Severe Pain (7 - 10)      CAPILLARY BLOOD GLUCOSE        I&O's Summary      PHYSICAL EXAM:  Vital Signs Last 24 Hrs  T(C): 36.7 (04 Jan 2024 01:21), Max: 37.7 (03 Jan 2024 22:00)  T(F): 98.1 (04 Jan 2024 01:21), Max: 99.8 (03 Jan 2024 22:00)  HR: 63 (04 Jan 2024 01:21) (63 - 81)  BP: 127/76 (04 Jan 2024 01:21) (115/62 - 148/85)  BP(mean): --  RR: 18 (04 Jan 2024 01:21) (18 - 18)  SpO2: 100% (04 Jan 2024 01:21) (95% - 100%)    Parameters below as of 04 Jan 2024 01:21  Patient On (Oxygen Delivery Method): Venturi Mask        CONSTITUTIONAL: NAD, well-developed, A&Ox3 to person, place, time.  RESPIRATORY: Normal respiratory effort; lungs are clear to auscultation bilaterally  CARDIOVASCULAR: Regular rate and rhythm, normal S1 and S2, no murmur/rub/gallop; No lower extremity edema; Peripheral pulses are 2+ bilaterally  ABDOMEN: Nontender to palpation, no rebound/guarding; No hepatosplenomegaly  MUSCLOSKELETAL: no clubbing or cyanosis of digits; no joint swelling or tenderness to palpation  NEURO: CN 2-12 grossly intact, moves all limbs spontaneously      LABS:                          9.3    9.11  )-----------( 297      ( 03 Jan 2024 05:50 )             30.5     01-03    141  |  106  |  23  ----------------------------<  152<H>  4.1   |  26  |  0.62    Ca    9.7      03 Jan 2024 05:50  Phos  3.3     01-03  Mg     2.30     01-03            -----    MICRO  Urinalysis Basic - ( 03 Jan 2024 05:50 )    Color: x / Appearance: x / SG: x / pH: x  Gluc: 152 mg/dL / Ketone: x  / Bili: x / Urobili: x   Blood: x / Protein: x / Nitrite: x   Leuk Esterase: x / RBC: x / WBC x   Sq Epi: x / Non Sq Epi: x / Bacteria: x        -----    TRENDS  Hemoglobin: 9.3 g/dL (01-03 @ 05:50)  Hemoglobin: 9.5 g/dL (01-02 @ 06:39)  Hemoglobin: 10.2 g/dL (01-01 @ 07:35)  Hemoglobin: 10.7 g/dL (12-31 @ 03:00)    Creatinine Trend: 0.62<--, 0.52<--, 0.58<--, 0.65<--, 0.56<--, 0.80<--  ----  RADIOLOGY & ADDITIONAL TESTS:  Results Reviewed:   Imaging Personally Reviewed:  Electrocardiogram Personally Reviewed:    COORDINATION OF CARE:  Care Discussed with Consultants/Other Providers [Y/N]:  Prior or Outpatient Records Reviewed [Y/N]:   PROGRESS NOTE:     Patient is a 63y old  Female who presents with a chief complaint of Inability to tolerate secretions (03 Jan 2024 12:03)      SUBJECTIVE / OVERNIGHT EVENTS: No overnight events, pt feeling well this AM.    ADDITIONAL REVIEW OF SYSTEMS: 10 point ROS negative except per HPI    MEDICATIONS  (STANDING):  apixaban 5 milliGRAM(s) Oral every 12 hours  artificial  tears Solution 1 Drop(s) Both EYES every 12 hours  ascorbic acid 500 milliGRAM(s) Oral daily  atovaquone  Suspension 1500 milliGRAM(s) Oral daily  benzocaine/menthol Lozenge 1 Lozenge Oral two times a day  folic acid 1 milliGRAM(s) Oral daily  gabapentin 300 milliGRAM(s) Oral three times a day  ipratropium 17 MICROgram(s) HFA Inhaler 2 Puff(s) Inhalation <User Schedule>  lactobacillus acidophilus 1 Tablet(s) Oral two times a day with meals  melatonin 6 milliGRAM(s) Oral at bedtime  metoprolol tartrate 12.5 milliGRAM(s) Oral two times a day  multivitamin 1 Tablet(s) Oral daily  pantoprazole    Tablet 40 milliGRAM(s) Oral before breakfast  polyethylene glycol 3350 17 Gram(s) Oral daily  psyllium Powder 1 Packet(s) Oral daily  senna 2 Tablet(s) Oral at bedtime  sodium chloride 0.65% Nasal 1 Spray(s) Both Nostrils three times a day  zinc oxide 40% Paste 1 Application(s) Topical two times a day    MEDICATIONS  (PRN):  ALPRAZolam 0.25 milliGRAM(s) Oral every 6 hours PRN Anxiety  ammonium lactate 12% Lotion 1 Application(s) Topical every 12 hours PRN BL feet dryness  baclofen 5 milliGRAM(s) Oral every 8 hours PRN Musculoskeletal Pain  benzonatate 100 milliGRAM(s) Oral three times a day PRN Cough  bisacodyl 5 milliGRAM(s) Oral daily PRN Constipation  hydrocortisone hemorrhoidal Suppository 1 Suppository(s) Rectal two times a day PRN Hemorrhoids  lidocaine   4% Patch 3 Patch Transdermal daily PRN pain in affected areas  loperamide 2 milliGRAM(s) Oral three times a day PRN Diarrhea  traMADol 25 milliGRAM(s) Oral every 6 hours PRN Severe Pain (7 - 10)      CAPILLARY BLOOD GLUCOSE        I&O's Summary      PHYSICAL EXAM:  Vital Signs Last 24 Hrs  T(C): 36.7 (04 Jan 2024 01:21), Max: 37.7 (03 Jan 2024 22:00)  T(F): 98.1 (04 Jan 2024 01:21), Max: 99.8 (03 Jan 2024 22:00)  HR: 63 (04 Jan 2024 01:21) (63 - 81)  BP: 127/76 (04 Jan 2024 01:21) (115/62 - 148/85)  BP(mean): --  RR: 18 (04 Jan 2024 01:21) (18 - 18)  SpO2: 100% (04 Jan 2024 01:21) (95% - 100%)    Parameters below as of 04 Jan 2024 01:21  Patient On (Oxygen Delivery Method): Venturi Mask        CONSTITUTIONAL: NAD, well-developed, A&Ox3 to person, place, time.  RESPIRATORY: Normal respiratory effort; lungs are clear to auscultation bilaterally; no stridor or wheezing  CARDIOVASCULAR: Regular rate and rhythm, normal S1 and S2, no murmur/rub/gallop; No lower extremity edema; Peripheral pulses are 2+ bilaterally  ABDOMEN: Nontender to palpation, no rebound/guarding; No hepatosplenomegaly  MUSCLOSKELETAL: no clubbing or cyanosis of digits; no joint swelling or tenderness to palpation  NEURO: CN 2-12 grossly intact, moves all limbs spontaneously      LABS:                          9.3    9.11  )-----------( 297      ( 03 Jan 2024 05:50 )             30.5     01-03    141  |  106  |  23  ----------------------------<  152<H>  4.1   |  26  |  0.62    Ca    9.7      03 Jan 2024 05:50  Phos  3.3     01-03  Mg     2.30     01-03            -----    MICRO  Urinalysis Basic - ( 03 Jan 2024 05:50 )    Color: x / Appearance: x / SG: x / pH: x  Gluc: 152 mg/dL / Ketone: x  / Bili: x / Urobili: x   Blood: x / Protein: x / Nitrite: x   Leuk Esterase: x / RBC: x / WBC x   Sq Epi: x / Non Sq Epi: x / Bacteria: x        -----    TRENDS  Hemoglobin: 9.3 g/dL (01-03 @ 05:50)  Hemoglobin: 9.5 g/dL (01-02 @ 06:39)  Hemoglobin: 10.2 g/dL (01-01 @ 07:35)  Hemoglobin: 10.7 g/dL (12-31 @ 03:00)    Creatinine Trend: 0.62<--, 0.52<--, 0.58<--, 0.65<--, 0.56<--, 0.80<--  ----  RADIOLOGY & ADDITIONAL TESTS:  Results Reviewed:   Imaging Personally Reviewed:  Electrocardiogram Personally Reviewed:    COORDINATION OF CARE:  Care Discussed with Consultants/Other Providers [Y/N]:  Prior or Outpatient Records Reviewed [Y/N]:

## 2024-01-04 NOTE — PROGRESS NOTE ADULT - PROBLEM SELECTOR PLAN 1
ENT scope with arytenoid edema  - s/p Angioedema cocktail dc'd on 1/3  -- dexamethasone loading dose given, now continue on dexamethasone 10 q8 (had been on a steroid taper currently on solumedrol 20 at home, so this would represent an increase)  -- diphenhydramine 50 mg IV q12hr  -- famotidine 30 mg IV q12hr-> per pharmacy max daily dose is 20mg   - ESR/CRP elevated, procal negative, RVP negative  - strep negative  - will begin methyl pred taper at 32mg beginning 1/4  - given celexocib is only new medication prior to angioedema episode, will dc and consider tramadol prn  - Allergy referral on discharge  - Pulm appt with Dr. Myles set for 1/4 at 1:45pm

## 2024-01-04 NOTE — PROGRESS NOTE ADULT - SUBJECTIVE AND OBJECTIVE BOX
Interval Events: Patient was seen and examined at bedside. No overnight events.    Patient doing okay, Tolerated tramadol yesterday without sedation and sleepiness. Did control pain.     REVIEW OF SYSTEMS:  Constitutional: [- ] fevers [ -] chills [ -] weight loss [ -] weight gain  CV: [- ] chest pain [- ] orthopnea [- ] palpitations [- ] murmur  Resp: [-] cough [ -] shortness of breath [- ] dyspnea [- ] wheezing [- ] sputum [ -] hemoptysis  [x ] All other systems negative  [ ] Unable to assess ROS because ________    OBJECTIVE:  ICU Vital Signs Last 24 Hrs  T(C): 36.6 (04 Jan 2024 09:29), Max: 37.7 (03 Jan 2024 22:00)  T(F): 97.9 (04 Jan 2024 09:29), Max: 99.8 (03 Jan 2024 22:00)  HR: 69 (04 Jan 2024 09:29) (63 - 81)  BP: 100/69 (04 Jan 2024 09:29) (100/69 - 148/85)  BP(mean): --  ABP: --  ABP(mean): --  RR: 18 (04 Jan 2024 09:29) (18 - 18)  SpO2: 98% (04 Jan 2024 09:29) (97% - 100%)    O2 Parameters below as of 04 Jan 2024 09:29  Patient On (Oxygen Delivery Method): room air              CAPILLARY BLOOD GLUCOSE    PHYSICAL EXAM:  Constitutional: well-developed; well-groomed; well-nourished; no distress,  Eyes: PERRL; EOMI  ENMT: Normal oropharnxy, no oral lesions, no erythema, no exudates  Neck:  Supple; no JVD; normal thyroid glan  Respiratory: airway patent; breath sounds equal; good air movement, no wheezing, no crackles, no rhonchi. no increase in WOB. No stridor.   Cardiovascular: regular rate and rhythm  no rub , no murmur, no gallops.   Gastrointestinal: soft; no distention, normal BS, no TTP, no organomegaly, no ascites.  Extremities: no clubbing; no cyanosis; no pedal edema, non-tender to palpation, DP and Radial pulses intact.  Neurological: alert and oriented x 3;   Skin: warm and dry; color normal: no rash: no ulcers  Psychiatric: Calm, no SI/HI        HOSPITAL MEDICATIONS:  MEDICATIONS  (STANDING):  apixaban 5 milliGRAM(s) Oral every 12 hours  artificial  tears Solution 1 Drop(s) Both EYES every 12 hours  ascorbic acid 500 milliGRAM(s) Oral daily  atovaquone  Suspension 1500 milliGRAM(s) Oral daily  benzocaine/menthol Lozenge 1 Lozenge Oral two times a day  folic acid 1 milliGRAM(s) Oral daily  gabapentin 300 milliGRAM(s) Oral three times a day  ipratropium 17 MICROgram(s) HFA Inhaler 2 Puff(s) Inhalation <User Schedule>  lactobacillus acidophilus 1 Tablet(s) Oral two times a day with meals  melatonin 6 milliGRAM(s) Oral at bedtime  metoprolol tartrate 12.5 milliGRAM(s) Oral two times a day  multivitamin 1 Tablet(s) Oral daily  pantoprazole    Tablet 40 milliGRAM(s) Oral before breakfast  polyethylene glycol 3350 17 Gram(s) Oral daily  psyllium Powder 1 Packet(s) Oral daily  senna 2 Tablet(s) Oral at bedtime  sodium chloride 0.65% Nasal 1 Spray(s) Both Nostrils three times a day  zinc oxide 40% Paste 1 Application(s) Topical two times a day    MEDICATIONS  (PRN):  ALPRAZolam 0.25 milliGRAM(s) Oral every 6 hours PRN Anxiety  ammonium lactate 12% Lotion 1 Application(s) Topical every 12 hours PRN BL feet dryness  baclofen 5 milliGRAM(s) Oral every 8 hours PRN Musculoskeletal Pain  benzonatate 100 milliGRAM(s) Oral three times a day PRN Cough  bisacodyl 5 milliGRAM(s) Oral daily PRN Constipation  hydrocortisone hemorrhoidal Suppository 1 Suppository(s) Rectal two times a day PRN Hemorrhoids  lidocaine   4% Patch 3 Patch Transdermal daily PRN pain in affected areas  loperamide 2 milliGRAM(s) Oral three times a day PRN Diarrhea  traMADol 25 milliGRAM(s) Oral every 6 hours PRN Severe Pain (7 - 10)      LABS:                        9.3    9.11  )-----------( 297      ( 03 Jan 2024 05:50 )             30.5     Hgb Trend: 9.3<--, 9.5<--, 10.2<--, 10.7<--  01-03    141  |  106  |  23  ----------------------------<  152<H>  4.1   |  26  |  0.62    Ca    9.7      03 Jan 2024 05:50  Phos  3.3     01-03  Mg     2.30     01-03      Creatinine Trend: 0.62<--, 0.52<--, 0.58<--, 0.65<--, 0.56<--, 0.80<--    Urinalysis Basic - ( 03 Jan 2024 05:50 )    Color: x / Appearance: x / SG: x / pH: x  Gluc: 152 mg/dL / Ketone: x  / Bili: x / Urobili: x   Blood: x / Protein: x / Nitrite: x   Leuk Esterase: x / RBC: x / WBC x   Sq Epi: x / Non Sq Epi: x / Bacteria: x          MICROBIOLOGY:     RADIOLOGY & EKG:  [x ] Reviewed and interpreted by me   Interval Events: Patient was seen and examined at bedside. No overnight events.    Patient doing okay, Tolerated tramadol yesterday without sedation and sleepiness. Did control pain.     REVIEW OF SYSTEMS:  Constitutional: [- ] fevers [ -] chills [ -] weight loss [ -] weight gain  CV: [- ] chest pain [- ] orthopnea [- ] palpitations [- ] murmur  Resp: [-] cough [ -] shortness of breath [- ] dyspnea [- ] wheezing [- ] sputum [ -] hemoptysis  [x ] All other systems negative  [ ] Unable to assess ROS because ________    OBJECTIVE:  ICU Vital Signs Last 24 Hrs  T(C): 36.6 (04 Jan 2024 09:29), Max: 37.7 (03 Jan 2024 22:00)  T(F): 97.9 (04 Jan 2024 09:29), Max: 99.8 (03 Jan 2024 22:00)  HR: 69 (04 Jan 2024 09:29) (63 - 81)  BP: 100/69 (04 Jan 2024 09:29) (100/69 - 148/85)  BP(mean): --  ABP: --  ABP(mean): --  RR: 18 (04 Jan 2024 09:29) (18 - 18)  SpO2: 98% (04 Jan 2024 09:29) (97% - 100%)    O2 Parameters below as of 04 Jan 2024 09:29  Patient On (Oxygen Delivery Method): room air              CAPILLARY BLOOD GLUCOSE    PHYSICAL EXAM:  Constitutional: well-developed; well-groomed; well-nourished; no distress,  Eyes: PERRL; EOMI  ENMT: Normal oropharnxy, no oral lesions, no erythema, no exudates  Neck:  Supple; no JVD; normal thyroid glan  Respiratory: airway patent; breath sounds equal; good air movement, no wheezing, no crackles, no rhonchi. no increase in WOB. No stridor.   Cardiovascular: regular rate and rhythm  no rub , no murmur, no gallops.   Gastrointestinal: soft; no distention, normal BS, no TTP, no organomegaly, no ascites.  Extremities: no clubbing; no cyanosis; +1 pedal edema (chronic), non-tender to palpation, DP and Radial pulses intact.  Neurological: alert and oriented x 3;   Skin: warm and dry; color normal: no rash: no ulcers  Psychiatric: Calm, no SI/HI        HOSPITAL MEDICATIONS:  MEDICATIONS  (STANDING):  apixaban 5 milliGRAM(s) Oral every 12 hours  artificial  tears Solution 1 Drop(s) Both EYES every 12 hours  ascorbic acid 500 milliGRAM(s) Oral daily  atovaquone  Suspension 1500 milliGRAM(s) Oral daily  benzocaine/menthol Lozenge 1 Lozenge Oral two times a day  folic acid 1 milliGRAM(s) Oral daily  gabapentin 300 milliGRAM(s) Oral three times a day  ipratropium 17 MICROgram(s) HFA Inhaler 2 Puff(s) Inhalation <User Schedule>  lactobacillus acidophilus 1 Tablet(s) Oral two times a day with meals  melatonin 6 milliGRAM(s) Oral at bedtime  metoprolol tartrate 12.5 milliGRAM(s) Oral two times a day  multivitamin 1 Tablet(s) Oral daily  pantoprazole    Tablet 40 milliGRAM(s) Oral before breakfast  polyethylene glycol 3350 17 Gram(s) Oral daily  psyllium Powder 1 Packet(s) Oral daily  senna 2 Tablet(s) Oral at bedtime  sodium chloride 0.65% Nasal 1 Spray(s) Both Nostrils three times a day  zinc oxide 40% Paste 1 Application(s) Topical two times a day    MEDICATIONS  (PRN):  ALPRAZolam 0.25 milliGRAM(s) Oral every 6 hours PRN Anxiety  ammonium lactate 12% Lotion 1 Application(s) Topical every 12 hours PRN BL feet dryness  baclofen 5 milliGRAM(s) Oral every 8 hours PRN Musculoskeletal Pain  benzonatate 100 milliGRAM(s) Oral three times a day PRN Cough  bisacodyl 5 milliGRAM(s) Oral daily PRN Constipation  hydrocortisone hemorrhoidal Suppository 1 Suppository(s) Rectal two times a day PRN Hemorrhoids  lidocaine   4% Patch 3 Patch Transdermal daily PRN pain in affected areas  loperamide 2 milliGRAM(s) Oral three times a day PRN Diarrhea  traMADol 25 milliGRAM(s) Oral every 6 hours PRN Severe Pain (7 - 10)      LABS:                        9.3    9.11  )-----------( 297      ( 03 Jan 2024 05:50 )             30.5     Hgb Trend: 9.3<--, 9.5<--, 10.2<--, 10.7<--  01-03    141  |  106  |  23  ----------------------------<  152<H>  4.1   |  26  |  0.62    Ca    9.7      03 Jan 2024 05:50  Phos  3.3     01-03  Mg     2.30     01-03      Creatinine Trend: 0.62<--, 0.52<--, 0.58<--, 0.65<--, 0.56<--, 0.80<--    Urinalysis Basic - ( 03 Jan 2024 05:50 )    Color: x / Appearance: x / SG: x / pH: x  Gluc: 152 mg/dL / Ketone: x  / Bili: x / Urobili: x   Blood: x / Protein: x / Nitrite: x   Leuk Esterase: x / RBC: x / WBC x   Sq Epi: x / Non Sq Epi: x / Bacteria: x          MICROBIOLOGY:     RADIOLOGY & EKG:  [x ] Reviewed and interpreted by me

## 2024-01-04 NOTE — PROGRESS NOTE ADULT - ATTENDING COMMENTS
62W hypercoagulable state secondary to chronic atrial fibrillation (on apixaban), h/o ILD admitted with angioedema of unclear etiology. Only new medication is celecoxib. Pulmonary and ENT following. Stable on current dose of steroids. Will taper per pulmonary recommendations. Will provide office information for A&I, rheumatology. Ms. Hager also has a pulmonary appointment scheduled in less than 2 weeks.     I spent 35 minutes counseling this patient and coordinating their discharge.

## 2024-01-04 NOTE — PROGRESS NOTE ADULT - PROBLEM SELECTOR PLAN 2
- ISO recent steroid use vs. new infection. Neutrophil predominant with no left shift  - Infectious workup as above  - CTM CBC, fever curve

## 2024-01-04 NOTE — DISCHARGE NOTE NURSING/CASE MANAGEMENT/SOCIAL WORK - PATIENT PORTAL LINK FT
You can access the FollowMyHealth Patient Portal offered by [facility] by registering at the following website: [email or web address]. By joining [facility]’s FollowMyHealth portal, you will also be able to view your health information using other applications (apps) compatible with our system. You can access the FollowMyHealth Patient Portal offered by [facility] by registering at the following website: [email or web address]. By joining [facility]’s FollowMyHealth portal, you will also be able to view your health information using other applications (apps) compatible with our system.

## 2024-01-04 NOTE — END OF VISIT
[Time Spent: ___ minutes] : I have spent [unfilled] minutes of time on the encounter. [FreeTextEntry3] :   I, Dr. Angelina Myles  personally performed the evaluation and management (E/M) services for this established patient who presents today with (a) new problem(s)/exacerbation of (an) existing condition(s). That E/M includes conducting the clinically appropriate interval history &/or exam, assessing all new/exacerbated conditions, and establishing a new plan of care. Today, my ZAID, Xena Epps NP, , was here to observe my evaluation and management service for this new problem/exacerbated condition and follow the plan of care established by me going forward.

## 2024-01-04 NOTE — HISTORY OF PRESENT ILLNESS
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 with initial steroid response c/b progressive hypoxemic and hypercapnic respiratory failure requiring intubation and VV- ECMO on 9/13 in the setting of steroid taper, then transferred to [facility] with differential at that time considering DAH vs ILD flare. She was decannulated from VV-ECMO 9/21/2023-----, extubated 9/22. She received pulse steroids, PLEX (9/15, 9/18, 9/20) and Rituximab (9/16 & 10/3). Her course c/b severe leukopenia s/p filgastrim, shock liver with slow to resolved hyperbilirubinemia, RIJ DVT, oropharyngeal dysphagia, and recurrent SVT. ---------- She clinically improved and was discharged to  rehab.   She presented  with worsening dysphagia, and odynophagia. Her chest imaging was largely unchanged from 12/5 and significantly improved from months prior in 9/2023. Since we saw her ENT has scoped the patient and noted edematous arytenoids concerning for angioedema. Unclear etiology. She has been on high dose steroids taper. Now down to 20mg daily with a taper plan over the several weeks. Work up so far with negative strep, RVP, CT signs of known ILD. Has been on dexamethasone with improvement.  CT CHEST 12/2023 MPRESSION: Unchanged and persistent reticular opacities predominantly in bilateral  lower lobe subpleural distribution. Additional interstitial interlobular septal thickening in bilateral lower  lobes may represent a component of pulmonary congestion. Underlying  interstitial lung disease cannot be excluded. Overall no significant  change to 12/5/2023.   echo 10/2023 CONCLUSIONS:   1. Left ventricular cavity is normally sized. Left ventricular wall thickness is normal. Left ventricular systolic function is normal with an ejection fraction of 64 % by Barnes's method of disks.  2. Normal right ventricular cavity size, wall thickness and systolic function.  3. There is trace tricuspid regurgitation. There is insufficient tricuspid regurgitation detected to calculate pulmonary artery systolic pressure.  4. There is mild aortic regurgitation.  5. No pericardial effusion seen.  6. Compared to the transthoracic echocardiogram performed on 9/19/2023 there have been no significant interval changes.  7. Normal left ventricular diastolic function, with normal filling pressure.   arrived post discharge accompanied by caregiver. Pt in wheelchair Dr. Stewart

## 2024-01-04 NOTE — PROGRESS NOTE ADULT - ASSESSMENT
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 with initial steroid response c/b progressive hypoxemic and hypercapnic respiratory failure requiring intubation and VV- ECMO on 9/13 in the setting of steroid taper, then transferred to [facility] with differential at that time considering DAH vs ILD flare. She was decannulated from VV-ECMO 9/21, extubated 9/22. She received pulse steroids, PLEX (9/15, 9/18, 9/20) and Rituximab (9/16 & 10/3). Her course c/b severe leukopenia s/p filgastrim, shock liver with slow to resolved hyperbilirubinemia, RIJ DVT, oropharyngeal dysphagia, and recurrent SVT.   She clinically improved and was discharged to  rehab.     She present with worsening dysphagia, and odynophagia.   Her chest imaging was largely unchanged from 12/5 and significantly improved from months prior in 9/2023.   Since we saw her ENT has scoped the patient and noted edematous arytenoids concerning for angioedema. Unclear etiology.   She has been on high dose steroids taper. Now down to 20mg daily with a taper plan over the several weeks.  Work up so far with negative strep, RVP, CT signs of known ILD. Has been on dexamethasone with improvement. Pending repeat scope by ENT.   Unclear trigger, new medication is Celecoxib.    # ILD  # Angioedema  # MCTD  # JACEY  - ILD is stable on imaging. On steroids taper as outpatient currently s/p high dose decadron.  - Can transition to PO steroids today.  Monitor for worsening. Per ENT can d/c angioedema cocktail.   - Start 32 mg of methylprednisolone starting today. Can taper off by 4mg every 5 days until complete.   - Will need to maintain PCP ppx with mepron during taper.  - D/W patient, given a possibility celecoxib maybe etiology for angioedema would stop at this time. Less likely given patient tolerated other NSAIDS in the past.   - Tolerated tramadol for severe pain. Took only once.. Avoid nocturnal use given likely JACEY.   - Will need outpatient PSG for likely JACEY.  - Is traveling to [place] next week. . Will has pulm follow up in [place], Will also need AI, Sleep medicine and Rheum follow up.   - f/u angioedema work up.  - Patient has follow up with Dr. Myles today at 1:45. If patient is stable for discharge please ensure patient is able to make it to her appointment.  - D/W patient and primary team.     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 with initial steroid response c/b progressive hypoxemic and hypercapnic respiratory failure requiring intubation and VV- ECMO on 9/13 in the setting of steroid taper, then transferred to [facility] with differential at that time considering DAH vs ILD flare. She was decannulated from VV-ECMO 9/21, extubated 9/22. She received pulse steroids, PLEX (9/15, 9/18, 9/20) and Rituximab (9/16 & 10/3). Her course c/b severe leukopenia s/p filgastrim, shock liver with slow to resolved hyperbilirubinemia, RIJ DVT, oropharyngeal dysphagia, and recurrent SVT.   She clinically improved and was discharged to  rehab.     She present with worsening dysphagia, and odynophagia.   Her chest imaging was largely unchanged from 12/5 and significantly improved from months prior in 9/2023.   Since we saw her ENT has scoped the patient and noted edematous arytenoids concerning for angioedema. Unclear etiology.   She has been on high dose steroids taper. Now down to 20mg daily with a taper plan over the several weeks.  Work up so far with negative strep, RVP, CT signs of known ILD. Has been on dexamethasone with improvement. Pending repeat scope by ENT.   Unclear trigger, new medication is Celecoxib.    # ILD  # Angioedema  # MCTD  # JACEY  - ILD is stable on imaging. On steroids taper as outpatient currently s/p high dose decadron.  - Can transition to PO steroids today.  Monitor for worsening. Per ENT can d/c angioedema cocktail.   - Start 32 mg of methylprednisolone starting today. Can taper off by 4mg every 5 days until complete.   - Will need to maintain PCP ppx with mepron during taper.  - D/W patient, given a possibility celecoxib maybe etiology for angioedema would stop at this time. Less likely given patient tolerated other NSAIDS in the past.   - Tolerated tramadol for severe pain. Took only once.. Avoid nocturnal use given likely JACEY.   - Will need outpatient PSG for likely JACEY.  - Is traveling to [place] next week. . Will has pulm follow up in [place], Will also need AI, Sleep medicine and Rheum follow up.   - f/u angioedema work up.  - Patient has follow up with Dr. Myles today at 1:45. If patient is stable for discharge please ensure patient is able to make it to her appointment.  - D/W patient and primary team.

## 2024-01-04 NOTE — PROGRESS NOTE ADULT - ASSESSMENT
Ms. Hager is a  62 YO woman with PMH of pAfib, sciatica, anxiety, ILD, admission 10/25/23-12/20/23 (transfer from [facility] to [facility]) for SOB, during which she required ECMO, plasmapheresis/rituximab, high-dose steroids, ccb platelet transfusions, dysphagia, RIJ DVT, discharged on steroid taper who [facility] with worsening shortness of breath, cough and difficulty tolerating secretions x1-2 days, found to have laryngeal edema.  Ms. Hager is a  62 YO woman with PMH of pAfib, sciatica, anxiety, ILD, admission 10/25/23-12/20/23 (transfer from [facility] to [facility]) for SOB, during which she required ECMO, plasmapheresis/rituximab, high-dose steroids, ccb platelet transfusions, dysphagia, RIJ DVT, discharged on steroid taper [facility] shortness [facility] breath, cough and difficulty tolerating secretions x1-2 days, found to have laryngeal edema.

## 2024-01-04 NOTE — ASSESSMENT
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 with initial steroid response c/b progressive hypoxemic and hypercapnic respiratory failure requiring intubation and VV- ECMO on 9/13 in the setting of steroid taper, then transferred to [facility] with  differential at that time considering DAH vs ILD flare. She was decannulated from VV-ECMO 9/21/2023-----, extubated 9/22/2023 . She received pulse steroids, PLEX (9/15, 9/18, 9/20) and Rituximab (9/16 & 10/3). Her course c/b severe leukopenia s/p filgastrim, shock liver with slow to resolved hyperbilirubinemia, RIJ DVT, oropharyngeal dysphagia, and recurrent SVT. ---------- She clinically improved and was discharged to  rehab.   She presented  with worsening dysphagia, and odynophagia. Her chest imaging was largely unchanged from 12/5 and significantly improved from months prior in 9/2023.     -----Since we saw her ENT has scoped the patient and noted edematous arytenoids concerning for angioedema. Unclear etiology.   She has been on high dose steroids taper. Now down to 20mg daily with a taper  plan over the several weeks.  Work up so far with negative strep, RVP, CT signs of known ILD. H  CT CHEST 12/2023 MPRESSION: Unchanged and persistent reticular opacities predominantly in bilateral  lower lobe subpleural distribution. Additional interstitial interlobular septal thickening in bilateral lower  lobes may represent a component of pulmonary congestion. Underlying  interstitial lung disease cannot be excluded.    PLAN - - - - - - --   1- MCTD (+ARIANA, +RNP)  ----pt now on a tapering schedule of   steroids --need to follow up  rheumatology ?RITUXAN as per rheumatology [ lien leach ] - - - - - - 2- ILD- ----Interstitial Pneumonia With Autoimmune Features (IPAF) ---NEED TO REPEAT CT IN 6-8 WEEKS TIME ---CONTD STEROID TAPER AS SUGGESTED BY RHEUMATOLOGY---ON  PCP PROPHYLAXIS---NEEDS PFT---OVERNIGT PULSE OXYMETRY-ABG---- 3-  ELEVATED LFT---DOWN TRENDING-------REPEAT LABS 4 WEEKS WITH PMD  --AVOID ANY ALCOHOL FOR NOW 4 NEEDS ECHO 5 DEXA SCAN  6  PULMONARY REHAB   ADVISED  7 PSG---HAS FEATURES OF SLEEP APNEA 8  ANEMIA--?ANEMAI OF CHRONIC DISEASE-------F/U Labs --HEMATOLOGY FOLLOW UP   F/U 6 WEEKS IN MY OFFICE      Start 32 mg of methylprednisolone starting today. Can taper off by 4mg every 5 days until complete. - Will need to maintain PCP ppx with mepron during taper.  -repeat CT chest  -PFT  f.u with rheum for MCTD ? rituxin  features of JACEY- needs PSG

## 2024-01-04 NOTE — DISCHARGE NOTE NURSING/CASE MANAGEMENT/SOCIAL WORK - NSDCPEFALRISK_GEN_ALL_CORE
For information on Fall & Injury Prevention, visit: [email or web address] OR  [email or web address] OR  https://www.cdc.gov/steadi/patient.html For information on Fall & Injury Prevention, visit: [email or web address] OR  [email or web address] OR  https://www.cdc.gov/steadi/patient.html

## 2024-01-04 NOTE — DISCHARGE NOTE NURSING/CASE MANAGEMENT/SOCIAL WORK - CAREGIVER ADDRESS
[address] [facility] [id number] [address] [facility] [id number]

## 2024-01-04 NOTE — PROGRESS NOTE ADULT - PROBLEM SELECTOR PROBLEM 3
Interstitial lung disease

## 2024-01-04 NOTE — PROGRESS NOTE ADULT - PROVIDER SPECIALTY LIST ADULT
Internal Medicine
Pulmonology
ENT
Pulmonology
Internal Medicine
Internal Medicine
Pulmonology
Internal Medicine

## 2024-01-04 NOTE — PROGRESS NOTE ADULT - REASON FOR ADMISSION
Inability to tolerate secretions

## 2024-01-04 NOTE — PROGRESS NOTE ADULT - TIME BILLING
Reviewing the EMR, vitals, imaging, medication list, recent labs, prior records and coordinating care with medical providers. This time excludes procedures and teaching.  Discharge planning
Reviewing the EMR, vitals, imaging, medication list, recent labs, prior records and coordinating care with medical providers. This time excludes procedures and teaching.

## 2024-01-05 ENCOUNTER — APPOINTMENT (OUTPATIENT)
Dept: PHYSICAL MEDICINE AND REHAB | Facility: CLINIC | Age: 64
End: 2024-01-05

## 2024-01-05 LAB
TRYPTASE SERPL-MCNC: <1 UG/L — SIGNIFICANT CHANGE UP
TRYPTASE SERPL-MCNC: <1 UG/L — SIGNIFICANT CHANGE UP

## 2024-01-06 LAB
C1INH FUNCTIONAL/C1INH TOTAL MFR SERPL: 37 MG/DL — SIGNIFICANT CHANGE UP (ref 21–39)
C1INH FUNCTIONAL/C1INH TOTAL MFR SERPL: 37 MG/DL — SIGNIFICANT CHANGE UP (ref 21–39)

## 2024-01-07 NOTE — CHART NOTE - NSCHARTNOTEFT_GEN_A_CORE
Pt is a 62 y/o female presented to ED  for difficulty swallowing.  As per HIE, pt has a pulmonary appt with Jayson Weldon on 1/4/24.

## 2024-01-09 ENCOUNTER — APPOINTMENT (OUTPATIENT)
Dept: CARDIOLOGY | Facility: CLINIC | Age: 64
End: 2024-01-09
Payer: COMMERCIAL

## 2024-01-09 DIAGNOSIS — I35.1 NONRHEUMATIC AORTIC (VALVE) INSUFFICIENCY: ICD-10-CM

## 2024-01-09 PROCEDURE — 93306 TTE W/DOPPLER COMPLETE: CPT

## 2024-01-10 PROBLEM — I35.1 MILD AORTIC INSUFFICIENCY: Status: ACTIVE | Noted: 2024-01-10

## 2024-01-11 ENCOUNTER — APPOINTMENT (OUTPATIENT)
Dept: PULMONOLOGY | Facility: CLINIC | Age: 64
End: 2024-01-11
Payer: COMMERCIAL

## 2024-01-11 VITALS
SYSTOLIC BLOOD PRESSURE: 116 MMHG | TEMPERATURE: 98.7 F | WEIGHT: 266 LBS | HEIGHT: 68 IN | RESPIRATION RATE: 15 BRPM | BODY MASS INDEX: 40.32 KG/M2 | HEART RATE: 77 BPM | DIASTOLIC BLOOD PRESSURE: 74 MMHG | OXYGEN SATURATION: 92 %

## 2024-01-11 DIAGNOSIS — M35.1 OTHER OVERLAP SYNDROMES: ICD-10-CM

## 2024-01-11 DIAGNOSIS — G47.33 OBSTRUCTIVE SLEEP APNEA (ADULT) (PEDIATRIC): ICD-10-CM

## 2024-01-11 PROCEDURE — 99215 OFFICE O/P EST HI 40 MIN: CPT | Mod: 25

## 2024-01-11 PROCEDURE — 36415 COLL VENOUS BLD VENIPUNCTURE: CPT

## 2024-01-11 RX ORDER — METHYLPREDNISOLONE 4 MG/1
4 TABLET ORAL
Refills: 0 | Status: DISCONTINUED | COMMUNITY
Start: 2024-01-04 | End: 2024-01-11

## 2024-01-11 RX ORDER — METHYLPREDNISOLONE 4 MG/1
4 TABLET ORAL
Qty: 63 | Refills: 0 | Status: ACTIVE | COMMUNITY
Start: 2024-01-11 | End: 1900-01-01

## 2024-01-11 NOTE — END OF VISIT
[FreeTextEntry3] :   I, Dr. Angelina Myles  personally performed the evaluation and management (E/M) services for this established patient who presents today with (a) new problem(s)/exacerbation of (an) existing condition(s). That E/M includes conducting the clinically appropriate interval history &/or exam, assessing all new/exacerbated conditions, and establishing a new plan of care. Today, my ZAID, Xena Epps NP, , was here to observe my evaluation and management service for this new problem/exacerbated condition and follow the plan of care established by me going forward. [Time Spent: ___ minutes] : I have spent [unfilled] minutes of time on the encounter.

## 2024-01-11 NOTE — HISTORY OF PRESENT ILLNESS
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 in the setting of ILD exacerbation? with initial steroid response c/b progressive hypoxemic and hypercapnic respiratory failure requiring intubation and VV- ECMO on 9/13 in the setting of steroid taper, then transferred to [facility] with differential at that time considering DAH vs ILD flare. She was decannulated from VV-ECMO 9/21/2023-----, extubated 9/22. She received pulse steroids, PLEX (9/15, 9/18, 9/20) and Rituximab (9/16 & 10/3). Her course c/b severe leukopenia s/p filgastrim, shock liver with slow to resolved hyperbilirubinemia, RIJ DVT, oropharyngeal dysphagia, and recurrent SVT. ---------- She clinically improved and was discharged to  rehab.  She presented with worsening dysphagia, and odynophagia. Her chest imaging was largely unchanged from 12/5 and significantly improved from months prior in 9/2023. Since we saw her ENT has scoped the patient and noted edematous arytenoids concerning for angioedema. Unclear etiology. She has been on high dose steroids taper. Now down to 20mg daily with a taper plan over the several weeks. Work up so far with negative strep, RVP, CT signs of known ILD. Has been on dexamethasone with improvement.  CT CHEST 12/2023 MPRESSION: Unchanged and persistent reticular opacities predominantly in bilateral lower lobe subpleural distribution. Additional interstitial interlobular septal thickening in bilateral lower lobes may represent a component of pulmonary congestion. Underlying interstitial lung disease cannot be excluded. Overall no significant change to 12/5/2023.   echo 10/2023 CONCLUSIONS:  1. Left ventricular cavity is normally sized. Left ventricular wall thickness is normal. Left ventricular systolic function is normal with an ejection fraction of 64 % by Barnes's method of disks.  2. Normal right ventricular cavity size, wall thickness and systolic function.  3. There is trace tricuspid regurgitation. There is insufficient tricuspid regurgitation detected to calculate pulmonary artery systolic pressure.  4. There is mild aortic regurgitation.  5. No pericardial effusion seen.  6. Compared to the transthoracic echocardiogram performed on 9/19/2023 there have been no significant interval changes.  7. Normal left ventricular diastolic function, with normal filling pressure.  	 TRANSTHORACIC ECHOCARDIOGRAM REPORT JAN 2024------- CONCLUSIONS: 1. Left ventricular cavity is normal. Left ventricular wall thickness is normal. Left ventricular systolic function is normal with an ejection fraction of 61 % by Barnes's method of disks. There are no regional wall motion abnormalities seen. 2. There is mild (grade 1) left ventricular diastolic dysfunction, with normal filling pressure. 3. Normal right ventricular cavity size. 4. Mild aortic regurgitation. 5. No significant valvular disease. 6. No pericardial effusion seen.     CT CHEST 12/2023 MPRESSION: Unchanged and persistent reticular opacities predominantly in bilateral lower lobe subpleural distribution. Additional interstitial interlobular septal thickening in bilateral lower lobes may represent a component of pulmonary congestion. Underlying interstitial lung disease cannot be excluded.  JAN 2024  ---------------   FEELS BETTER--ON TAPERING DOSES OF STEROIDS ---OVERNIGHT PULSE OXYMTERY  SUGGEST UNDERLYING SLEEP APNEA---OFFERED  HER SPLIT NIGHT STUDY BUT PT WANTS TO GET IT  DONE IN [place]---SHE NEEDS TO SEE RHEUMATOLOGY  BEFORE SHE LEAVES FOR [place]----------

## 2024-01-11 NOTE — DISCUSSION/SUMMARY
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 with initial steroid response c/b progressive hypoxemic and hypercapnic respiratory failure requiring intubation and VV- ECMO on 9/13 in the setting of steroid taper, then transferred to [facility] with differential at that time considering DAH vs ILD flare. She was decannulated from VV-ECMO 9/21/2023-----, extubated 9/22/2023. She received pulse steroids, PLEX (9/15, 9/18, 9/20) and Rituximab (9/16 & 10/3). Her course c/b severe leukopenia s/p filgastrim, shock liver with slow to resolved hyperbilirubinemia, RIJ DVT, oropharyngeal dysphagia, and recurrent SVT. ---------- She clinically improved and was discharged to  rehab.  She presented with worsening dysphagia, and odynophagia. Her chest imaging was largely unchanged from 12/5 and significantly improved from months prior in 9/2023. -----Since we saw her ENT has scoped the patient and noted edematous arytenoids concerning for angioedema. Unclear etiology. She has been on high dose steroids taper. Now down to 20mg daily with a taper plan over the several weeks. Work up so far with negative strep, RVP, CT signs of known ILD.    JAN 2024---- Overnight pulse oximetry shows evidence suggestion of sleep apnea patient needs a split-night study so that she can be treated with positive airway pressure therapy--- PLANNING TO RETURN TO [place]  ------ PLAN - - - - - - --  1- MCTD (+ARIANA, +RNP) ----pt now on a tapering schedule of steroids --need to follow up rheumatology ?RITUXAN as per rheumatology [ lien leach ] - - - - - - 2- ILD- ----Interstitial Pneumonia With Autoimmune Features (IPAF) ---NEED TO REPEAT CT IN 6-8 WEEKS TIME ---CONTD STEROID TAPER AS SUGGESTED BY RHEUMATOLOGY---ON PCP PROPHYLAXIS--- 3- ELEVATED LFT---DOWN TRENDING-------REPEAT LABS 4 WEEKS WITH PMD --AVOID ANY ALC- 4] 4]-ECHO--- no evidence of pulmonary hypertension 5 DEXA SCAN 6 PULMONARY REHAB ADVISED 7 OVERNIGHT PULSE OXYMTERY-------HAS FEATURES OF SLEEP APNEA-- -OVERNIGHT PULSE OXYMTERY  SUGGEST UNDERLYING SLEEP APNEA---OFFERED  HER SPLIT NIGHT STUDY BUT PT WANTS TO GET IT  DONE IN [place]---SHE NEEDS TO SEE RHEUMATOLOGY  BEFORE SHE LEAVES FOR [place]---------- 8 ANEMIA--?ANEMIA OF CHRONIC DISEASE-------F/U Labs --HEMATOLOGY FOLLOW UP   F/U IN MY OFFICE PRN----      Start 32 mg of methylprednisolone starting today. Can taper off by 4mg every 5 days until complete. - Will need to maintain PCP ppx with mepron during taper.  -repeat CT chest -PFT  f.u with rheum for MCTD ? rituxin  features of JACEY- needs PSG.    Thanks for allowing  me to participate  in the care of this patient.  Patient at this time  will follow  the above mentioned recommendations and return back for follow up visit. If you have any questions  I can be reached  at # [phone] (office).  Angelina Myles MD, [facility]  Pulmonary, Critical Care and Sleep Medicine

## 2024-01-12 LAB
ALBUMIN SERPL ELPH-MCNC: 4 G/DL
ALP BLD-CCNC: 121 U/L
ALT SERPL-CCNC: 37 U/L
ANION GAP SERPL CALC-SCNC: 14 MMOL/L
AST SERPL-CCNC: 23 U/L
BILIRUB SERPL-MCNC: 0.5 MG/DL
BUN SERPL-MCNC: 18 MG/DL
CALCIUM SERPL-MCNC: 9.8 MG/DL
CHLORIDE SERPL-SCNC: 104 MMOL/L
CK SERPL-CCNC: 12 U/L
CO2 SERPL-SCNC: 23 MMOL/L
CREAT SERPL-MCNC: 0.5 MG/DL
EGFR: 104 ML/MIN/1.73M2
GLUCOSE SERPL-MCNC: 130 MG/DL
HCT VFR BLD CALC: 36.8 %
HGB BLD-MCNC: 10.8 G/DL
MCHC RBC-ENTMCNC: 23.4 PG
MCHC RBC-ENTMCNC: 29.3 GM/DL
MCV RBC AUTO: 79.8 FL
NT-PROBNP SERPL-MCNC: 114 PG/ML
PLATELET # BLD AUTO: 356 K/UL
POTASSIUM SERPL-SCNC: 4.1 MMOL/L
PROT SERPL-MCNC: 5.4 G/DL
RBC # BLD: 4.61 M/UL
RBC # FLD: 17.2 %
SODIUM SERPL-SCNC: 141 MMOL/L
WBC # FLD AUTO: 15.04 K/UL

## 2024-01-15 ENCOUNTER — NON-APPOINTMENT (OUTPATIENT)
Age: 64
End: 2024-01-15

## 2024-01-16 ENCOUNTER — APPOINTMENT (OUTPATIENT)
Dept: CARE COORDINATION | Facility: HOME HEALTH | Age: 64
End: 2024-01-16
Payer: COMMERCIAL

## 2024-01-16 ENCOUNTER — APPOINTMENT (OUTPATIENT)
Dept: CARE COORDINATION | Facility: HOME HEALTH | Age: 64
End: 2024-01-16

## 2024-01-16 DIAGNOSIS — E66.9 OVERWEIGHT: ICD-10-CM

## 2024-01-16 DIAGNOSIS — I48.0 PAROXYSMAL ATRIAL FIBRILLATION: ICD-10-CM

## 2024-01-16 DIAGNOSIS — E66.3 OVERWEIGHT: ICD-10-CM

## 2024-01-16 PROCEDURE — 99349 HOME/RES VST EST MOD MDM 40: CPT | Mod: 95

## 2024-01-17 ENCOUNTER — APPOINTMENT (OUTPATIENT)
Dept: PULMONOLOGY | Facility: CLINIC | Age: 64
End: 2024-01-17

## 2024-01-17 PROBLEM — I48.0 PAROXYSMAL ATRIAL FIBRILLATION: Status: ACTIVE | Noted: 2024-01-04

## 2024-01-17 PROBLEM — E66.3 OVERWEIGHT AND OBESITY: Status: ACTIVE | Noted: 2024-01-04

## 2024-01-17 LAB
CK BB SERPL ELPH-CCNC: NORMAL
CK MB CFR SERPL ELPH: NORMAL
CK MM SERPL ELPH-CCNC: NORMAL
CREATINE KINASE,TOTAL,SERUM: 8 U/L
MACRO TYPE 1: NORMAL
MACRO TYPE 2: NORMAL %

## 2024-01-17 NOTE — HEALTH RISK ASSESSMENT
[No] : No [No falls in past year] : Patient reported no falls in the past year [Assistive Device] : Patient uses an assistive device [Little interest or pleasure doing things] : 1) Little interest or pleasure doing things [Feeling down, depressed, or hopeless] : 2) Feeling down, depressed, or hopeless [0] : 2) Feeling down, depressed, or hopeless: Not at all (0) [PHQ-2 Negative - No further assessment needed] : PHQ-2 Negative - No further assessment needed [None] : None [With Significant Other] : lives with significant other [# of Members in Household ___] :  household currently consist of [unfilled] member(s) [Employed] : employed [Significant Other] : lives with significant other [Feels Safe at Home] : Feels safe at home [Independent] : managing finances [Some assistance needed] : using transportation [Full assistance needed] : doing laundry [Reports changes in vision] : Reports changes in vision [With Patient/Caregiver] : , with patient/caregiver [Designated Healthcare Proxy] : Designated healthcare proxy [Name: ___] : Health Care Proxy's Name: [unfilled]  [Relationship: ___] : Relationship: [unfilled] [Aggressive treatment] : aggressive treatment [I will adhere to the patient's wishes.] : I will adhere to the patient's wishes. [Time Spent: ___ minutes] : Time Spent: [unfilled] minutes [Never] : Never [PHQ-2 Positive] : PHQ-2 Positive [de-identified] : had PT appt today, canceled due to the weather. " I have been doing my bands and weights" [de-identified] : no special diet, I will get together with a  or prepared meals [de-identified] : has walker, uses it for stability. Has rollator, raised toilet seat [[id number]] : 0 [Change in mental status noted] : No change in mental status noted [Language] : denies difficulty with language [Behavior] : denies difficulty with behavior [Learning/Retaining New Information] : denies difficulty learning/retaining new information [Handling Complex Tasks] : denies difficulty handling complex tasks [Reasoning] : denies difficulty with reasoning [Spatial Ability and Orientation] : denies difficulty with spatial ability and orientation [Reports changes in hearing] : Reports no changes in hearing [MammogramComments] : missed last appointment due to hospitalization, she will make an appointment upon returning home [PapSmearComments] : will make appt upon returning home to [place] [FreeTextEntry1] : stand by for safety  uses commode in shower [FreeTextEntry8] : walker [FreeTextEntry2] : insta cart [FreeTextEntry3] : has a maid [de-identified] : " I have tinnitus, left ear worse than right [de-identified] : sometimes my vision is blurry [AdvancecareDate] : 01/24 [FreeTextEntry4] : CPR vs DNR, has living will  [de-identified] : exposed to second hand smoke

## 2024-01-17 NOTE — HISTORY OF PRESENT ILLNESS
[Other Location: e.g. School (Enter Location, City,State)___] : at [unfilled], at the time of the visit. [Other Location: e.g. Home (Enter Location, City,State)___] : at [unfilled] [Partner] : partner [Verbal consent obtained from patient] : the patient, [unfilled] [Post-hospitalization from ___ Hospital] : Post-hospitalization from [unfilled] Hospital [Admitted on: ___] : The patient was admitted on [unfilled] [Discharged on ___] : discharged on [unfilled] [Discharge Summary] : discharge summary [Discharge Med List] : discharge medication list [Other: ____] : [unfilled] [Med Reconciliation] : medication reconciliation has been completed [Patient Contacted By: ____] : and contacted by [unfilled] [FreeTextEntry2] : Ms. Hager is a  62 YO woman with PMH of pAfib, sciatica, anxiety, ILD, admission 9/13-10/5 (transfer from [facility] to [facility]) for SOB, during which she required ECMO, plasmapheresis/rituximab, high-dose steroids, ccb platelet transfusions, dysphagia, RIJ DVT, discharged on steroid taper, recently discharged 12/20 from [facility], who presents with worsening shortness of breath, cough and difficulty tolerating secretions x1-2 days. She initially wento [facility] where she recieved IV lasix and a dose of DuoNeb with improvement of symptoms, preferred to come to [facility] for admission. RVP negative at [facility], leukocytosis of 14. CT chest unchanged. She was unable to take her steroids today 2/2 secretions. No fever. Cough noted, especially after she had some water. Was told she likely has JACEY, noted to be desaturating to 70s overnight at [facility] on personal monitor, awaiting CPAP evaluation. Toileting well, has had high volume of urine s/p Lasix. Developing some back pain 2/2 hospital bed.  In ED, patient was evaluated by ENT with laryngoscopy, found to have edema of the arytenoids, started on angioedema cocktail - dexamethasone, diphenhydramine, famotidine  (01 Jan 2024 01:41) Hospital Course: ENT consulted and pt found to have edematous arytenoids. Started on IV dexamethasone, diphenhydramine, famotidine regimen with improvement in symptoms; medications dc'd on 1/3. Cleared for diet. Also seen by pulmonary team. Pt started on methylprednisolone taper beginning at 32mg. Celecoxib dc'd as it was the only new medication prior to this episode of angioedema. Infectious workup negative. Complement studies sent. Home medications restarted. Important Medication Changes and Reason: -methylprednisolone 32mg daily x5 days (1/4-1/8) methylprednisolone 28mg daily x5 days (1/9-1/13) methylprednisolone 24mg daily x5 days (1/14-1/18) methylprednisolone 20mg daily x5 days (1/19-1/23) methylprednisolone 16mg daily x5 days (1/24-1/28) methylprednisolone 12mg daily x5 days (1/29-2/2) methylprednisolone 8mg daily x5 days (2/3-2/7) methylprednisolone 4mg daily x5 days (2/8-2/12) -d/c celecoxib- pt's only new medication prior to this episode of angioedema -tramadol 50mg q6hr prn x7d Active or Pending Issues Requiring Follow-up: -pulm appt (Dr. Myles) 1/4/24 at 1:45 -allergy clinic information provided -PSG for JACEY Advanced Directives:  [x ] Full code  [ ] DNR  [ ] Hospice Discharge Diagnoses: #Angioedema 64 y/o female seen today for TCM visit. Pt partner present as per patient. Pt She is awake, alert and oriented x 3, in no acute distress; breathing is even and unlabored. She is positive for intermittent cough and runny nose. States she has seasonal allergies and is taking "Sinu-tabs" with positive results. She denies any fever or SOB. She has not used supplemental oxygen "in one month" and her oxygen saturation has been greater than 90%. oxygen saturation checked by patient during visit 94% as per this visit but has purchased portable oxygen as she will be traveling to [place] on Friday. States she is traveling by car and rail. Pt has been educated on the importance of movement throughout her trip, including standing and  ambulating during the 17 hour ride. Reviewed her hx of DVT and use of Eliquis and risk of DVT on long trips. Also advised to elevate her lower extremities while sitting. She has been educated on the risk of elevated blood sugar with prednisone use. Discussed limiting concentrated sugars, s/s of hyperglycemia and to use frozen fruit if she can not find fresh fruits and to limit her use of nectars and dilute with water. She is in agreement.  Pt states she has PCP in [place] along with ENT and PULM. PCP appointment is on 1/31/24. She has called [facility] clinic in [place] so she can establish continued evaluation and care with Rheumatology and ENT. Reinforced the importance of having care in her home state. She is in agreement.  She was seen by Dr. Myles, canceled PT for today. Reminded to call TCM for any concerns.

## 2024-01-17 NOTE — PLAN
[FreeTextEntry1] : follow up with PULM in [place], seen by Dr. Myles 1/11/24 follow up with CARD, RHEUM, ENT follow up with PCP  follow up with MAMMO and GYN

## 2024-01-17 NOTE — COUNSELING
[Fall prevention counseling provided] : Fall prevention counseling provided [Adequate lighting] : Adequate lighting [FreeTextEntry1] : raised toilet seat, rollator and walker

## 2024-01-17 NOTE — REVIEW OF SYSTEMS
[Fatigue] : fatigue [Vision Problems] : vision problems [Nasal Discharge] : nasal discharge [Sore Throat] : sore throat [Postnasal Drip] : postnasal drip [Palpitations] : palpitations [Lower Ext Edema] : lower extremity edema [Back Pain] : back pain [Negative] : Psychiatric [Fever] : no fever [Hot Flashes] : no hot flashes [Chills] : no chills [Night Sweats] : no night sweats [Recent Change In Weight] : ~T no recent weight change [Pain] : no pain [Redness] : no redness [Dryness] : no dryness  [Itching] : no itching [Earache] : no earache [Hearing Loss] : no hearing loss [Nosebleed] : no nosebleeds [Hoarseness] : no hoarseness [Chest Pain] : no chest pain [Orthopnea] : no orthopnea [FreeTextEntry2] : tired after taking my meds [FreeTextEntry3] : " sometimes I have blurry vision after taking my meds" Wears glasses.  [FreeTextEntry4] : runny nose, I have allergies I have tinnitus no difficulty swallowing [FreeTextEntry5] : " I get palpitations every once in a while. My calves hurt when I walk" [de-identified] : "my skin has changed, it was peeling and drier" [FreeTextEntry9] : " I have back pain so I have been sleeping in a recliner"  It helps my back [de-identified] : I have lymphedema, my calves feel like they are heavy, stretched.

## 2024-01-17 NOTE — PHYSICAL EXAM
[No Acute Distress] : no acute distress [No Accessory Muscle Use] : no accessory muscle use [Normal Affect] : the affect was normal [Alert and Oriented x3] : oriented to person, place, and time [Normal Mood] : the mood was normal [Normal Insight/Judgement] : insight and judgment were intact [Well Developed] : well developed [Well-Appearing] : well-appearing [Normal Voice/Communication] : normal voice/communication [de-identified] : denies dysphagia [de-identified] : BLE edema

## 2024-01-19 ENCOUNTER — APPOINTMENT (OUTPATIENT)
Dept: RHEUMATOLOGY | Facility: CLINIC | Age: 64
End: 2024-01-19

## 2024-01-19 ENCOUNTER — APPOINTMENT (OUTPATIENT)
Dept: PHYSICAL MEDICINE AND REHAB | Facility: CLINIC | Age: 64
End: 2024-01-19
Payer: COMMERCIAL

## 2024-01-19 DIAGNOSIS — I89.0 LYMPHEDEMA, NOT ELSEWHERE CLASSIFIED: ICD-10-CM

## 2024-01-19 DIAGNOSIS — J98.4 EMPHYSEMA, UNSPECIFIED: ICD-10-CM

## 2024-01-19 DIAGNOSIS — R53.81 OTHER MALAISE: ICD-10-CM

## 2024-01-19 DIAGNOSIS — J43.9 EMPHYSEMA, UNSPECIFIED: ICD-10-CM

## 2024-01-19 DIAGNOSIS — Z74.09 OTHER REDUCED MOBILITY: ICD-10-CM

## 2024-01-19 PROCEDURE — 99215 OFFICE O/P EST HI 40 MIN: CPT | Mod: 95

## 2024-01-19 NOTE — ASSESSMENT
[FreeTextEntry1] :  63Y/F Hx of interstitial lung disease with resp failure Recent discharge from acute rehab treatment  Making continued functional progress, but yet to attain baseline function Ambulatory distance limited by lymphedema of legs  Continue PT/OT in [place] as planned Continue f/u with your specialty providers Discharge from [facility] Can be re-referred as needed.

## 2024-01-19 NOTE — HISTORY OF PRESENT ILLNESS
[FreeTextEntry1] : 62 y/o F Hx pAFIB, back pain, acute care admission and treatment for interstitial lung disease with respiratory failure at [facility] and subsequent acute rehab treatment at [facility] before d/c home 12/20/23  Seen via TELEHEALTH from Rehab Clinic  On discharge from acute rehab, she was ambulating with 15-20ft with walker with moderate assistance x 1 person and achieving functional transfers with contact guard assistance.  WC ambulatory >400ft, self-propelled  Post discharge she has a brief hosp admission to [facility] for difficulty with her throat while tapering off steroids.  She recommenced home PT/OT post discharge During review today, while on her was to [place].  She reports routine follow up with her specialty providers here in [place] since discharge, including Pulmonary. She reports that she is no longer requiring oxy and plans to see a pulmonologist in [place] for a sleep study and to facilitate getting a nightly CPAP device  She has been getting home therapy, reports ambulating with cane, household distance and rolling walker outside up to half a block, limited by leg edema which is persisting  She plans to see a lymphedema specialist in [place] She also has an appointment with a Rheumatologist in [place] by end of January She is currently on 24mg of prednisone, reducing by 4mg/wk  She plans to stay in [place] till the Summer and will be liaising with her [place] providers as needed. I have told her to make contact whenever needed.

## 2024-01-19 NOTE — REVIEW OF SYSTEMS
[Hoarseness] : no hoarseness [Lower Ext Edema] : lower extremity edema [Cough] : no cough [Diarrhea] : no diarrhea [Difficulty Walking] : difficulty walking

## 2024-01-19 NOTE — PROGRESS NOTE ADULT - ASSESSMENT
62 year old female with PMH of pAFIB and sciatica; who presented to [facility] ED with SOB, and was found to have groundglass opacities and interstitial lung disease. She was treated with empiric Vancomycin and Zosyn. She underwent a bronchoscopy with bronchoalveolar lavage and pulse steroids. She was given IV diuretics for increased pulmonary congestion, but her respiratory status continued to worsen.  On 9/13, she was transferred to [facility] for ECMO requirements. She was further treated with Plasmapheresis, Rituximab and high-dose steroids, with significant improvement. Her overall hospital course was complicated by thrombocytopenia (s/p several platelet transfusions), leukocytosis (infectious workup entirely negative- s/p Vanco and Ceftriaxone), AFIB with RVR (resolved with Metoprolol), dysphagia (requiring NGT), transaminitis (secondary to acute illness and hypotension),  non-occlusive RIJ DVT (started on Lovenox). Patient now admitted for a multidisciplinary rehab program- pt/ot/dvt ppx    #Interstitial Lung Disease, s/p ECMO   - s/p Plasmapheresis, Rituximab and high- dose steroids   - Albuterol/Ipratropium Nebulizer every 6 hours  - Mepron   - PO Medrol taper- Plan will be to taper by ~20% every 2 weeks  - Repeat CT Chest in 6 weeks   - o2 support prn  - pulm and cardio consult recc noted    #  UTI  - BC negative  -Culture Results: >100,000 CFU/ml Enterococcus faecalis sensitivity pending.  12/4/23  - was started on bactrim treated 5 days until 12/6/23- abtx changed to zosyn- now being treated with amoxicillin per ID  - chest  ct chest - noted - no new changes   - RVP negative   - d/w ID at the beside.   - refused rectal temp, blood draws and repeat lactate.   - clinically improving     #pAFIB  - Metoprolol     - DVT:  Lovenox 70mg BID- switch to Eliquis   - RIJ DVT found at ECMO cannula     #Transaminitis, jaundice  - Secondary to acute illness and hypotension at [facility]  - Trend LFTs, bili-  downtrending  - Outpatient follow up     #Sleep  - Melatonin     will follow  d/w rehab team       62 year old female with PMH of pAFIB and sciatica; who presented to [facility] ED with SOB, and was found to have groundglass opacities and interstitial lung disease. She was treated with empiric Vancomycin and Zosyn. She underwent a bronchoscopy with bronchoalveolar lavage and pulse steroids. She was given IV diuretics for increased pulmonary congestion, but her respiratory status continued to worsen.  On 9/13, she was transferred to [facility] for ECMO requirements. She was further treated with Plasmapheresis, Rituximab and high-dose steroids, with significant improvement. Her overall hospital course was complicated by thrombocytopenia (s/p several platelet transfusions), leukocytosis (infectious workup entirely negative- s/p Vanco and Ceftriaxone), AFIB with RVR (resolved with Metoprolol), dysphagia (requiring NGT), transaminitis (secondary to acute illness and hypotension),  non-occlusive RIJ DVT (started on Lovenox). Patient now admitted for a multidisciplinary rehab program- pt/ot/dvt ppx    #Interstitial Lung Disease, s/p ECMO   - s/p Plasmapheresis, Rituximab and high- dose steroids   - Albuterol/Ipratropium Nebulizer every 6 hours  - Mepron   - PO Medrol taper- Plan will be to taper by ~20% every 2 weeks  - Repeat CT Chest in 6 weeks   - o2 support prn  - pulm and cardio consult recc noted    #  UTI  - BC negative  -Culture Results: >100,000 CFU/ml Enterococcus faecalis sensitivity pending.  12/4/23  - was started on bactrim treated 5 days until 12/6/23- abtx changed to zosyn- now being treated with amoxicillin per ID  - chest  ct chest - noted - no new changes   - RVP negative   - d/w ID at the beside.   - refused rectal temp, blood draws and repeat lactate.   - clinically improving     #pAFIB  - Metoprolol     - DVT:  Lovenox 70mg BID- switch to Eliquis   - RIJ DVT found at ECMO cannula     #Transaminitis, jaundice  - Secondary to acute illness and hypotension at [facility]  - Trend LFTs, bili-  downtrending  - Outpatient follow up     #Sleep  - Melatonin     will follow  d/w rehab team       Good

## 2024-01-19 NOTE — PHYSICAL EXAM
[FreeTextEntry1] : EXAM--TELEHEALTH  In no apparent distress Able to raise hands above head without distress. Speaking fluently and clearly

## 2024-01-30 ENCOUNTER — TRANSCRIPTION ENCOUNTER (OUTPATIENT)
Age: 64
End: 2024-01-30

## 2024-02-02 NOTE — PHYSICAL THERAPY INITIAL EVALUATION ADULT - STANDING BALANCE: STATIC
Problem: Potential for Falls  Goal: Patient will remain free of falls  Description: INTERVENTIONS:  - Educate patient/family on patient safety including physical limitations  - Instruct patient to call for assistance with activity   - Consult OT/PT to assist with strengthening/mobility   - Keep Call bell within reach  - Keep bed low and locked with side rails adjusted as appropriate  - Keep care items and personal belongings within reach  - Initiate and maintain comfort rounds  - Make Fall Risk Sign visible to staff  - Apply yellow socks and bracelet for high fall risk patients  - Consider moving patient to room near nurses station  Outcome: Progressing     Problem: Knowledge Deficit  Goal: Patient/family/caregiver demonstrates understanding of disease process, treatment plan, medications, and discharge instructions  Description: Complete learning assessment and assess knowledge base.  Interventions:  - Provide teaching at level of understanding  - Provide teaching via preferred learning methods  Outcome: Progressing     
independent

## 2024-02-12 ENCOUNTER — RX RENEWAL (OUTPATIENT)
Age: 64
End: 2024-02-12

## 2024-02-12 RX ORDER — METOPROLOL TARTRATE 25 MG/1
25 TABLET, FILM COATED ORAL DAILY
Qty: 30 | Refills: 0 | Status: ACTIVE | COMMUNITY
Start: 1900-01-01 | End: 1900-01-01

## 2024-02-14 ENCOUNTER — RX RENEWAL (OUTPATIENT)
Age: 64
End: 2024-02-14

## 2024-02-14 RX ORDER — APIXABAN 5 MG/1
5 TABLET, FILM COATED ORAL
Qty: 60 | Refills: 0 | Status: ACTIVE | COMMUNITY
Start: 2024-01-04 | End: 1900-01-01

## 2024-02-22 ENCOUNTER — RX RENEWAL (OUTPATIENT)
Age: 64
End: 2024-02-22

## 2024-02-22 RX ORDER — ATOVAQUONE 750 MG/5ML
750 SUSPENSION ORAL DAILY
Qty: 900 | Refills: 0 | Status: ACTIVE | COMMUNITY
Start: 1900-01-01 | End: 1900-01-01

## 2024-04-03 ENCOUNTER — APPOINTMENT (OUTPATIENT)
Dept: HEART AND VASCULAR | Facility: CLINIC | Age: 64
End: 2024-04-03

## 2024-04-09 ENCOUNTER — RX RENEWAL (OUTPATIENT)
Age: 64
End: 2024-04-09

## 2024-04-09 RX ORDER — IPRATROPIUM BROMIDE AND ALBUTEROL SULFATE 2.5; .5 MG/3ML; MG/3ML
0.5-2.5 (3) SOLUTION RESPIRATORY (INHALATION)
Qty: 540 | Refills: 0 | Status: ACTIVE | COMMUNITY
Start: 2024-01-04 | End: 1900-01-01

## 2024-04-09 RX ORDER — FOLIC ACID 1 MG/1
1 TABLET ORAL
Qty: 90 | Refills: 0 | Status: ACTIVE | COMMUNITY
Start: 1900-01-01 | End: 1900-01-01

## 2024-04-11 NOTE — BH CONSULTATION LIAISON PROGRESS NOTE - NSBHCONSULTRECOMMENDOTHER_PSY_A_CORE FT
Orders placed for patient to have labs drawn on 5/10/2024.   1) Would give patient Vincentown goals and also feedback on what she can expect from the recovery process, guessing only increases anxiety.   ( e.g noting that she has been able to increase reps in PT , or that she has been able to gain more truncal coordination).   2) Continue Alprazolam for anxiety , can use for sleep.

## 2024-04-22 NOTE — PROGRESS NOTE ADULT - ASSESSMENT
Assessment/Plan:  ALIZA FOLEY is a 62 year old female with PMH of pAFIB and sciatica; who presented to [facility] ED with SOB, and was found to have groundglass opacities and interstitial lung disease. She was treated with empiric Vancomycin and Zosyn. She underwent a bronchoscopy with bronchoalveolar lavage and pulse steroids. She was given IV diuretics for increased pulmonary congestion, but her respiratory status continued to worsen.  On 9/13, she was transferred to [facility] for ECMO requirements. She was further treated with Plasmapheresis, Rituximab and high-dose steroids, with significant improvement. Her overall hospital course was complicated by thrombocytopenia (s/p several platelet transfusions), leukocytosis (infectious workup entirely negative- s/p Vanco and Ceftriaxone), AFIB with RVR (resolved with Metoprolol), dysphagia (requiring NGT), transaminitis (secondary to acute illness and hypotension),  non-occlusive RIJ DVT (started on Lovenox). Patient now admitted for a multidisciplinary rehab program. 10-05-23 @ 13:18    * Tolerating Eliquis (AFIB and RIJ DVT)   * Wound care review and recs apprecaited--Multiple wounds--perineal and buttock/sacral   * Blood draws from Left arm Midline   * Monitor BMs  * Continue Pulmonary review and recs appreciated and implemented     #Interstitial Lung Disease  - Gait Instability, ADL impairments and Functional impairments: start Comprehensive Rehab Program of PT/OT/SLP - 3 hours a day, 5 days a week  - P&O as needed   - Non-specific Interstitial Lung Disease  - Requiring ECMO   - Improvement s/p Plasmapheresis, Rituximab and high- dose steroids   - Albuterol/Ipratropium Nebulizer every 6 hours  - Mepron 1500mg daily  - PO Medrol ( due to liver dysfunction): Plan will be to taper by ~20% every 2 weeks  Medrol 64mg x 2 weeks  56mg x 2 weeks  44mg x 2 weeks   36mg x 2 weeks - Follow up Outpatient   -- Repeat CT Chest in 6 weeks   - Pulmonary following     #pAFIB/Rt Ij thrombus  - Metoprolol 25mg BID and lovenox  -- Cardiology consult--recm can switch to oral AC  --Await discussion with patient's cardiologist before switch to oral AC as requested by patient   (recent GI bleed, from hemorrhoid, resolved)      # Lymphedema both legs -chronic and Rt IJ thrombus  - ACE Wrap BL LEs  - BL LE doppler negative for DVT  - BL UE doppler with chronic thrombotic changes in RIJ   - Surg consult recs--commence anticoagulation as recs by cardiology  - Eliquis 5mg BID - tolerating well     #Transaminitis   - Secondary to acute illness and hypotension at [facility]  - Trend LFTs  - Outpatient follow up     #Sleep  - Melatonin 6mg at HS    #Skin  - Skin: Left lower abdomen ruptured blister 3.0 x 1.0, stage 2 pressure injury to right buttock 4 x1 and left buttock 3x1, stage 2 pressure injury ti right inner thigh 0.2 x 0.2, right groin wound 10.5 x 2.0, Left groin wound 5 x 5,  right neck scab wound  -- MAD to skin folds- Nystatin to submammary and abdominal pannus BID  -- MAD to L groin- cleanse with NS, Triad cream daily  -- Mad to R groin- Nystatin powder daily   -- R groin wound-- aquacel packing, Triad to periwound, Allevyn foam- daily and PRN   - Pressure injury/Skin: OOB to Chair, PT/OT   - Offload pressure, low air loss support surfaces, T&P every 2 hours   --Wound care consult-10/9 -Multiple wounds--perineal and buttock/sacral, recs appreciated   --Suggest Continue treatments as below:   Twice daily & PRN Normal Saline Cleanse of abdominal pannus & breast folds, perineal, Left & Right inner buttock erosions.  Pat thoroughly dry.   Apply Desitin generously twice daily (& PRN) to perineal, buttocks, and left groin erosions, leave open to air.    Apply Nystatin powder to abdominal pannus and breast folds.  Suggest use of Interdry cloths in folds to 'wick' moisture.  Suggest daily Normal Saline Cleanse of right groin surgical wound, pat dry.  Apply Cavillon film barrier to intact periwound skin.  Place Aquacell strip over open area, cover with foam dressing.       #Pain Mgmt   - Tylenol PRN     #GI/Bowel Mgmt   - Pantoprazole  - Senna  --on hold due to recent Loose BM  - PRN: Maalox     #/Bladder Mgmt --voiding     #FEN   #Dysphagia  - Diet - Minced & Moist  [CC]    - Dysphaiga- SLP evaluation     #Health maintenance  - Folic Acid   - MVI  - Ocean nasal spray    # Difficulty with access to peripheral veins--  * Blood draws from Left arm Medline     #Precautions / PROPHYLAXIS:   - Falls  - ortho: Weight bearing status: WBAT   - Lungs: Aspiration, Incentive Spirometer   - DVT PPX: Eliquis 5 mg BID   ---------------------------    * Labs--stable anemia, Bld sugar normal, on tapering steroid dose  CMP today 10/10    Liaison with family  Patient's partner present during review, details of review d/w her, detailed explanation of therapy protocol explained and she was happy with same  10/9--Partner present during review and discussed details treatment plan with her    Liaison with providers  Consult recs form multiple specialities being implemented  Surgical consult and recs 10/9--agreed with plan for AC for Rt IJ chronic thrombus    10/10--No response to multiple calls to patient's private cardiologist Dr Otto Stratton  ph   Will discuss commencing oral anticoagulation with patient and commence     ---------------------------  IDT conference on 10/10  TDD: 10/27 to home vs EMIGDIO.  Barriers: Obesity, poor skin integrity, poor endurance, BL UE weakness, poor coordination, pain, incontinent x2.  Social Work: Lives with spouse in [place] 6 months of year. DC to apt in [place] with elevator, no steps. Supportive spouse.   DME: Has 02 compressor.   OT: Max A for eating/grooming. Total for all other ADLs and transfers. Goal of Mod A.   PT: Damaris 2-3pr A. Sitting EOB is max.   SLP: SBS with TLs. Mild cog. Severe aphonia.  RT: --  Functional level on DC: Min A for transfers.  ---------------------------  OUTPATIENT/FOLLOW UP:    Trae Whitney  Pulmonary Disease  [address], [address] [place] [address]  Phone: [phone]  Fax: [phone]  Follow Up Time: 2 weeks    Ryanne Allen  Rheumatology  [address] [address]  Phone: [phone]  Fax: [phone]  Follow Up Time: 1 week    Kyle Pierce  Internal Medicine  [address] [place] [address], [address]  [place] [address]  Phone: [phone]  Fax: [phone]  Follow Up Time: 2 weeks    Addy Powers  Pulmonary Disease  [address] [facility] [address]  [place], [place] [address]  Phone: [phone]  Fax: [phone]  Follow Up Time:     Kwame Hawley  Critical Care Medicine  [address] [facility], [facility] [address]  [place] [address]  Phone: [phone]  Fax: [phone]  Follow Up Time:     Neena Borrego  Rheumatology  [address] [facility], Floor [address]  [place] [address]  Phone: [phone]  Fax: [phone]  Follow Up Time:    [facility]  [facility] [address]  Scheduled Appointment: 09/13/2023  2:40 PM  ---------------------------   Assessment/Plan:  ALIZA FOLEY is a 62 year old female with PMH of pAFIB and sciatica; who presented to [facility] ED with SOB, and was found to have groundglass opacities and interstitial lung disease. She was treated with empiric Vancomycin and Zosyn. She underwent a bronchoscopy with bronchoalveolar lavage and pulse steroids. She was given IV diuretics for increased pulmonary congestion, but her respiratory status continued to worsen.  On 9/13, she was transferred to [facility] for ECMO requirements. She was further treated with Plasmapheresis, Rituximab and high-dose steroids, with significant improvement. Her overall hospital course was complicated by thrombocytopenia (s/p several platelet transfusions), leukocytosis (infectious workup entirely negative- s/p Vanco and Ceftriaxone), AFIB with RVR (resolved with Metoprolol), dysphagia (requiring NGT), transaminitis (secondary to acute illness and hypotension),  non-occlusive RIJ DVT (started on Lovenox). Patient now admitted for a multidisciplinary rehab program. 10-05-23 @ 13:18    * ENT consult for hx of tinnitus and feeling of ear fullness  * Wound care review and recs apprecaited--Multiple wounds--perineal and buttock/sacral    * Continue Pulmonary review and recs appreciated and implemented   * Elevated wbc will repeat and monitor     #Interstitial Lung Disease  - Gait Instability, ADL impairments and Functional impairments: start Comprehensive Rehab Program of PT/OT/SLP - 3 hours a day, 5 days a week  - P&O as needed   - Non-specific Interstitial Lung Disease  - Requiring ECMO   - Improvement s/p Plasmapheresis, Rituximab and high- dose steroids   - Albuterol/Ipratropium Nebulizer every 6 hours  - Mepron 1500mg daily  - PO Medrol ( due to liver dysfunction): Plan will be to taper by ~20% every 2 weeks  Medrol 64mg x 2 weeks  56mg x 2 weeks  44mg x 2 weeks   36mg x 2 weeks - Follow up Outpatient   -- Repeat CT Chest in 6 weeks   - Pulmonary following     #pAFIB/Rt Ij thrombus  - Metoprolol 25mg BID and lovenox  -- Cardiology consult--recm can switch to oral AC  --Await discussion with patient's cardiologist before switch to oral AC as requested by patient   (recent GI bleed, from hemorrhoid, resolved)    # Chronic Tinnitus   * ENT consult for hx of tinnitus and feeling of ear fullness    # Lymphedema both legs -chronic and Rt IJ thrombus  - ACE Wrap BL LEs  - BL LE doppler negative for DVT  - BL UE doppler with chronic thrombotic changes in RIJ   - Surg consult recs--commence anticoagulation as recs by cardiology  - Eliquis 5mg BID - tolerating well     #Transaminitis   - Secondary to acute illness and hypotension at [facility]  - Trend LFTs  - Outpatient follow up     #Sleep  - Melatonin 6mg at HS    #Skin  - Skin: Left lower abdomen ruptured blister 3.0 x 1.0, stage 2 pressure injury to right buttock 4 x1 and left buttock 3x1, stage 2 pressure injury ti right inner thigh 0.2 x 0.2, right groin wound 10.5 x 2.0, Left groin wound 5 x 5,  right neck scab wound  -- MAD to skin folds- Nystatin to submammary and abdominal pannus BID  -- MAD to L groin- cleanse with NS, Triad cream daily  -- Mad to R groin- Nystatin powder daily   -- R groin wound-- aquacel packing, Triad to periwound, Allevyn foam- daily and PRN   - Pressure injury/Skin: OOB to Chair, PT/OT   - Offload pressure, low air loss support surfaces, T&P every 2 hours   --Wound care consult-10/9 -Multiple wounds--perineal and buttock/sacral, recs appreciated   --Suggest Continue treatments as below:   Twice daily & PRN Normal Saline Cleanse of abdominal pannus & breast folds, perineal, Left & Right inner buttock erosions.  Pat thoroughly dry.   Apply Desitin generously twice daily (& PRN) to perineal, buttocks, and left groin erosions, leave open to air.    Apply Nystatin powder to abdominal pannus and breast folds.  Suggest use of Interdry cloths in folds to 'wick' moisture.  Suggest daily Normal Saline Cleanse of right groin surgical wound, pat dry.  Apply Cavillon film barrier to intact periwound skin.  Place Aquacell strip over open area, cover with foam dressing.       #Pain Mgmt   - Tylenol PRN     #GI/Bowel Mgmt   - Pantoprazole  - Senna  --on hold due to recent Loose BM  - PRN: Maalox     #/Bladder Mgmt --voiding     #FEN   #Dysphagia  - Diet - Minced & Moist  [CC]    - Dysphaiga- SLP evaluation     #Health maintenance  - Folic Acid   - MVI  - Ocean nasal spray    # Difficulty with access to peripheral veins--  * Blood draws from Left arm Medline     #Precautions / PROPHYLAXIS:   - Falls  - ortho: Weight bearing status: WBAT   - Lungs: Aspiration, Incentive Spirometer   - DVT PPX: Eliquis 5 mg BID   ---------------------------    * Labs--stable anemia, Bld sugar normal, on tapering steroid dose  CMP today 10/10    Liaison with family  Patient's partner present during review, details of review d/w her, detailed explanation of therapy protocol explained and she was happy with same  10/9--Partner present during review and discussed details treatment plan with her    Liaison with providers  Consult recs form multiple specialities being implemented  Surgical consult and recs 10/9--agreed with plan for AC for Rt IJ chronic thrombus    10/10--No response to multiple calls to patient's private cardiologist Dr Otto Stratton  ph   Will discuss commencing oral anticoagulation with patient and commence     ---------------------------  IDT conference on 10/10  TDD: 10/27 to home vs EMIGDIO.  Barriers: Obesity, poor skin integrity, poor endurance, BL UE weakness, poor coordination, pain, incontinent x2.  Social Work: Lives with spouse in [place] 6 months of year. DC to apt in [place] with elevator, no steps. Supportive spouse.   DME: Has 02 compressor.   OT: Max A for eating/grooming. Total for all other ADLs and transfers. Goal of Mod A.   PT: Damaris 2-3pr A. Sitting EOB is max.   SLP: SBS with TLs. Mild cog. Severe aphonia.  RT: --  Functional level on DC: Min A for transfers.  ---------------------------  OUTPATIENT/FOLLOW UP:    Trae Whitney  Pulmonary Disease  [address], [address] [place] [address]  Phone: [phone]  Fax: [phone]  Follow Up Time: 2 weeks    Ryanne Allen  Rheumatology  [address] [address]  Phone: [phone]  Fax: [phone]  Follow Up Time: 1 week    Kyle Pierce  Internal Medicine  [address] [place] [address], [address]  [place] [address]  Phone: [phone]  Fax: [phone]  Follow Up Time: 2 weeks    Addy Powers  Pulmonary Disease  [address] [facility] [address]  [place], [place] [address]  Phone: [phone]  Fax: [phone]  Follow Up Time:     Kwame Hawley  Critical Care Medicine  [address] [facility] [address]  [place] [address]  Phone: [phone]  Fax: [phone]  Follow Up Time:     Neena Borrego  Rheumatology  [address] [place] [address], [address]  [place] [address]  Phone: [phone]  Fax: [phone]  Follow Up Time:    [facility]  [facility] [address]  Scheduled Appointment: 09/13/2023  2:40 PM  ---------------------------   No

## 2024-07-08 ENCOUNTER — RX RENEWAL (OUTPATIENT)
Age: 64
End: 2024-07-08

## 2024-10-28 ENCOUNTER — RX RENEWAL (OUTPATIENT)
Age: 64
End: 2024-10-28

## 2024-11-01 NOTE — PROVIDER CONTACT NOTE (HYPOGLYCEMIA EVENT) - NS PROVIDER CONTACT CONTRIBUTING FACTORS OF EPISODE
Continue on home treatment Valbenazine Tosylate 40mg HS.    Poor oral intake within the last 24 hours/Tube feeding

## 2025-01-26 NOTE — PHYSICAL THERAPY INITIAL EVALUATION ADULT - SITTING BALANCE: STATIC
You can access the FollowMyHealth Patient Portal offered by [facility] by registering at the following website: [email or web address]. By joining [employer]’s FollowMyHealth portal, you will also be able to view your health information using other applications (apps) compatible with our system. independent

## 2025-03-07 NOTE — PROGRESS NOTE ADULT - ASSESSMENT
62 year old female with a past medical history of afib, and sciatica, who presented to [facility] for shortness of breath found to be hypoxic and have interstitial lung disease, admitted for AHRF, further ILD workup and management; course c/b tachyarrhytmia from AF w RVR and SVT, now transferred to telemetry.  none

## 2025-03-12 NOTE — ED ADULT NURSE NOTE - NSICDXPASTMEDICALHX_GEN_ALL_CORE_FT
AUTHORIZATION FOR RELEASE OF   CONFIDENTIAL INFORMATION    Dear Dr Cordoba,    We are seeing Steffanie Agee, date of birth 1999, in the clinic at [facility]. ALEJANDRA Parisi is the patient's provider. Steffanie Agee has an outstanding lab/procedure at the time we reviewed her chart. In order to help keep her health information updated, she has authorized us to request the following medical record(s):        (  )  MAMMOGRAM                                      (  )  COLONOSCOPY      (  x)  PAP SMEAR                                          (  )  OUTSIDE LAB RESULTS     (  )  DEXA SCAN                                          (  )  EYE EXAM            (  )  FOOT EXAM                                          (  )  ENTIRE RECORD     (  )  OUTSIDE IMMUNIZATIONS                 (  )  _______________         Please fax records to [facility], Ka'Ryn Franklin, AGNP-C, [phone]     If you have any questions, please contact HYACINTH Rucker at [phone]          Patient Name: Steffanie Agee  : 1999  Patient Phone #: [phone]     
PAST MEDICAL HISTORY:  Afib     Interstitial lung disease     Lymphedema     Sciatica

## (undated) DEVICE — FORCEP BIOPSY OVAL CUP DISP